# Patient Record
Sex: MALE | Race: WHITE | NOT HISPANIC OR LATINO | Employment: OTHER | URBAN - METROPOLITAN AREA
[De-identification: names, ages, dates, MRNs, and addresses within clinical notes are randomized per-mention and may not be internally consistent; named-entity substitution may affect disease eponyms.]

---

## 2018-05-10 LAB
%SAT (HISTORICAL): 26 % (ref 20–55)
25(OH)D3 SERPL-MCNC: 15.2 NG/ML
ALBUMIN (HISTORICAL): < 0.7 MG/DL
ALBUMIN SERPL BCP-MCNC: 4.5 G/DL (ref 3.5–5.7)
ALP SERPL-CCNC: 49 IU/L (ref 55–165)
ALT SERPL W P-5'-P-CCNC: 18 IU/L (ref 10–35)
ANION GAP SERPL CALCULATED.3IONS-SCNC: 11.6 MM/L
AST SERPL W P-5'-P-CCNC: 26 U/L (ref 8–27)
BASOPHILS # BLD AUTO: 0.1 X3/UL (ref 0–0.3)
BASOPHILS # BLD AUTO: 0.8 % (ref 0–2)
BILIRUB SERPL-MCNC: 0.8 MG/DL (ref 0.3–1)
BILIRUB UR QL STRIP: NEGATIVE
BILIRUBIN DIRECT (HISTORICAL): 0.1 MG/DL (ref 0–0.2)
BUN SERPL-MCNC: 17 MG/DL (ref 7–25)
CALCIUM SERPL-MCNC: 9.1 MG/DL (ref 8.6–10.5)
CHLORIDE SERPL-SCNC: 103 MM/L (ref 98–107)
CHOLEST SERPL-MCNC: 167 MG/DL (ref 0–200)
CLARITY UR: CLEAR
CO2 SERPL-SCNC: 26 MM/L (ref 21–31)
COLOR UR: YELLOW
CREAT SERPL-MCNC: 0.8 MG/DL (ref 0.7–1.3)
DEPRECATED RDW RBC AUTO: 12.9 % (ref 11.5–14.5)
EGFR (HISTORICAL): > 60 GFR
EGFR AFRICAN AMERICAN (HISTORICAL): > 60 GFR
EOSINOPHIL # BLD AUTO: 0.1 X3/UL (ref 0–0.5)
EOSINOPHIL NFR BLD AUTO: 1.4 % (ref 0–5)
GLUCOSE (HISTORICAL): 84 MG/DL (ref 65–99)
GLUCOSE UR STRIP-MCNC: NEGATIVE MG/DL
HCT VFR BLD AUTO: 43.6 % (ref 42–52)
HDLC SERPL-MCNC: 47 MG/DL (ref 40–60)
HGB BLD-MCNC: 14.6 G/DL (ref 14–18)
HGB UR QL STRIP.AUTO: NEGATIVE
IRON SERPL-MCNC: 106 UG/DL (ref 50–212)
KETONES UR STRIP-MCNC: NEGATIVE MG/DL
LDLC SERPL CALC-MCNC: 109.9 MG/DL (ref 75–193)
LEUKOCYTE ESTERASE UR QL STRIP: NEGATIVE
LYMPHOCYTES # BLD AUTO: 2.1 X3/UL (ref 1.2–4.2)
LYMPHOCYTES NFR BLD AUTO: 30 % (ref 20.5–51.1)
MCH RBC QN AUTO: 30.2 PG (ref 26–34)
MCHC RBC AUTO-ENTMCNC: 33.4 G/DL (ref 31–36)
MCV RBC AUTO: 90.4 FL (ref 81–99)
MONOCYTES # BLD AUTO: 0.4 X3/UL (ref 0–1)
MONOCYTES NFR BLD AUTO: 6 % (ref 1.7–12)
NEUTROPHILS # BLD AUTO: 4.4 X3/UL (ref 1.4–6.5)
NEUTS SEG NFR BLD AUTO: 61.8 % (ref 42.2–75.2)
NITRITE UR QL STRIP: NEGATIVE
OSMOLALITY, SERUM (HISTORICAL): 275 MOSM (ref 262–291)
PH UR STRIP.AUTO: 6.5 [PH] (ref 4.5–8)
PLATELET # BLD AUTO: 218 X3/UL (ref 130–400)
PMV BLD AUTO: 8.5 FL (ref 8.6–11.7)
POTASSIUM SERPL-SCNC: 3.6 MM/L (ref 3.5–5.5)
PROSTATE SPECIFIC ANTIGEN FREE (HISTORICAL): 0.02 NG/ML (ref 0.2–4.9)
PROSTATE SPECIFIC ANTIGEN PERCENT FREE (HISTORICAL): 24.6 %
PROT UR STRIP-MCNC: NEGATIVE MG/DL
PSA (HISTORICAL): 0.07 NG/ML (ref 0–4)
RBC # BLD AUTO: 4.82 X6/UL (ref 4.3–5.9)
SODIUM SERPL-SCNC: 137 MM/L (ref 134–143)
SP GR UR STRIP.AUTO: 1.01 (ref 1–1.03)
T4 FREE SERPL-MCNC: 0.97 NG/DL (ref 0.6–1.7)
TIBC SERPL-MCNC: 414 UG/DL (ref 250–425)
TOTAL PROTEIN (HISTORICAL): 7.2 G/DL (ref 6.4–8.9)
TRANSFERRIN (HISTORICAL): 296 MG/DL (ref 215–365)
TRIGL SERPL-MCNC: 53 MG/DL (ref 44–166)
TSH SERPL DL<=0.05 MIU/L-ACNC: 1.52 UIU/M (ref 0.45–5.33)
UROBILINOGEN UR QL STRIP.AUTO: 0.2 EU/DL (ref 0.2–8)
VIT B12 SERPL-MCNC: 585 PG/ML (ref 180–914)
VLDL CHOLESTEROL (HISTORICAL): 11 MG/DL (ref 5–51)
WBC # BLD AUTO: 7.1 X3/UL (ref 4.8–10.8)

## 2020-02-11 ENCOUNTER — HOSPITAL ENCOUNTER (EMERGENCY)
Facility: HOSPITAL | Age: 65
Discharge: HOME | End: 2020-02-11
Attending: FAMILY MEDICINE | Admitting: FAMILY MEDICINE
Payer: COMMERCIAL

## 2020-02-11 ENCOUNTER — APPOINTMENT (EMERGENCY)
Dept: RADIOLOGY | Facility: HOSPITAL | Age: 65
End: 2020-02-11
Payer: COMMERCIAL

## 2020-02-11 ENCOUNTER — OFFICE VISIT (OUTPATIENT)
Dept: URGENT CARE | Facility: CLINIC | Age: 65
End: 2020-02-11
Payer: COMMERCIAL

## 2020-02-11 ENCOUNTER — APPOINTMENT (EMERGENCY)
Dept: CT IMAGING | Facility: HOSPITAL | Age: 65
End: 2020-02-11
Payer: COMMERCIAL

## 2020-02-11 VITALS
HEIGHT: 69 IN | RESPIRATION RATE: 20 BRPM | WEIGHT: 177.2 LBS | SYSTOLIC BLOOD PRESSURE: 218 MMHG | OXYGEN SATURATION: 97 % | BODY MASS INDEX: 26.25 KG/M2 | DIASTOLIC BLOOD PRESSURE: 118 MMHG | HEART RATE: 64 BPM | TEMPERATURE: 96.6 F

## 2020-02-11 VITALS
BODY MASS INDEX: 26.14 KG/M2 | RESPIRATION RATE: 16 BRPM | HEART RATE: 65 BPM | DIASTOLIC BLOOD PRESSURE: 112 MMHG | WEIGHT: 177 LBS | TEMPERATURE: 97.4 F | SYSTOLIC BLOOD PRESSURE: 196 MMHG | OXYGEN SATURATION: 97 %

## 2020-02-11 DIAGNOSIS — R42 DIZZINESS AND GIDDINESS: ICD-10-CM

## 2020-02-11 DIAGNOSIS — Z76.0 MEDICATION REFILL: ICD-10-CM

## 2020-02-11 DIAGNOSIS — I10 HYPERTENSION: Primary | ICD-10-CM

## 2020-02-11 DIAGNOSIS — I16.1 HYPERTENSIVE EMERGENCY: Primary | ICD-10-CM

## 2020-02-11 LAB
ALBUMIN SERPL BCP-MCNC: 4.8 G/DL (ref 3.5–5.7)
ALP SERPL-CCNC: 53 U/L (ref 55–165)
ALT SERPL W P-5'-P-CCNC: 13 U/L (ref 7–52)
ANION GAP SERPL CALCULATED.3IONS-SCNC: 7 MMOL/L (ref 4–13)
APTT PPP: 35 SECONDS (ref 23–37)
AST SERPL W P-5'-P-CCNC: 23 U/L (ref 13–39)
BASOPHILS # BLD AUTO: 0.1 THOUSANDS/ΜL (ref 0–0.1)
BASOPHILS NFR BLD AUTO: 1 % (ref 0–2)
BILIRUB SERPL-MCNC: 0.6 MG/DL (ref 0.2–1)
BUN SERPL-MCNC: 12 MG/DL (ref 7–25)
CALCIUM SERPL-MCNC: 9.6 MG/DL (ref 8.6–10.5)
CHLORIDE SERPL-SCNC: 103 MMOL/L (ref 98–107)
CO2 SERPL-SCNC: 31 MMOL/L (ref 21–31)
CREAT SERPL-MCNC: 0.86 MG/DL (ref 0.7–1.3)
EOSINOPHIL # BLD AUTO: 0.4 THOUSAND/ΜL (ref 0–0.61)
EOSINOPHIL NFR BLD AUTO: 4 % (ref 0–5)
ERYTHROCYTE [DISTWIDTH] IN BLOOD BY AUTOMATED COUNT: 12.5 % (ref 11.5–14.5)
GFR SERPL CREATININE-BSD FRML MDRD: 90 ML/MIN/1.73SQ M
GLUCOSE SERPL-MCNC: 115 MG/DL (ref 65–99)
HCT VFR BLD AUTO: 50 % (ref 42–47)
HGB BLD-MCNC: 16.8 G/DL (ref 14–18)
INR PPP: 1.01 (ref 0.9–1.5)
LYMPHOCYTES # BLD AUTO: 1.7 THOUSANDS/ΜL (ref 0.6–4.47)
LYMPHOCYTES NFR BLD AUTO: 21 % (ref 21–51)
MCH RBC QN AUTO: 31.1 PG (ref 26–34)
MCHC RBC AUTO-ENTMCNC: 33.5 G/DL (ref 31–37)
MCV RBC AUTO: 93 FL (ref 81–99)
MONOCYTES # BLD AUTO: 0.6 THOUSAND/ΜL (ref 0.17–1.22)
MONOCYTES NFR BLD AUTO: 7 % (ref 2–12)
NEUTROPHILS # BLD AUTO: 5.4 THOUSANDS/ΜL (ref 1.4–6.5)
NEUTS SEG NFR BLD AUTO: 67 % (ref 42–75)
PLATELET # BLD AUTO: 215 THOUSANDS/UL (ref 149–390)
PMV BLD AUTO: 8.1 FL (ref 8.6–11.7)
POTASSIUM SERPL-SCNC: 4 MMOL/L (ref 3.5–5.5)
PROT SERPL-MCNC: 8.2 G/DL (ref 6.4–8.9)
PROTHROMBIN TIME: 11.7 SECONDS (ref 10.2–13)
RBC # BLD AUTO: 5.39 MILLION/UL (ref 4.3–5.9)
SODIUM SERPL-SCNC: 141 MMOL/L (ref 134–143)
WBC # BLD AUTO: 8.1 THOUSAND/UL (ref 4.8–10.8)

## 2020-02-11 PROCEDURE — 70450 CT HEAD/BRAIN W/O DYE: CPT

## 2020-02-11 PROCEDURE — 71046 X-RAY EXAM CHEST 2 VIEWS: CPT

## 2020-02-11 PROCEDURE — 93005 ELECTROCARDIOGRAM TRACING: CPT

## 2020-02-11 PROCEDURE — 36415 COLL VENOUS BLD VENIPUNCTURE: CPT | Performed by: PHYSICIAN ASSISTANT

## 2020-02-11 PROCEDURE — 99284 EMERGENCY DEPT VISIT MOD MDM: CPT | Performed by: PHYSICIAN ASSISTANT

## 2020-02-11 PROCEDURE — 85730 THROMBOPLASTIN TIME PARTIAL: CPT | Performed by: PHYSICIAN ASSISTANT

## 2020-02-11 PROCEDURE — 80053 COMPREHEN METABOLIC PANEL: CPT | Performed by: PHYSICIAN ASSISTANT

## 2020-02-11 PROCEDURE — 99203 OFFICE O/P NEW LOW 30 MIN: CPT | Performed by: PHYSICIAN ASSISTANT

## 2020-02-11 PROCEDURE — 85610 PROTHROMBIN TIME: CPT | Performed by: PHYSICIAN ASSISTANT

## 2020-02-11 PROCEDURE — 85025 COMPLETE CBC W/AUTO DIFF WBC: CPT | Performed by: PHYSICIAN ASSISTANT

## 2020-02-11 PROCEDURE — 99284 EMERGENCY DEPT VISIT MOD MDM: CPT

## 2020-02-11 RX ORDER — AMLODIPINE BESYLATE 5 MG/1
5 TABLET ORAL DAILY
Qty: 30 TABLET | Refills: 0 | Status: SHIPPED | OUTPATIENT
Start: 2020-02-11 | End: 2020-02-24 | Stop reason: SDUPTHER

## 2020-02-11 RX ORDER — LOSARTAN POTASSIUM 50 MG/1
50 TABLET ORAL ONCE
Status: COMPLETED | OUTPATIENT
Start: 2020-02-11 | End: 2020-02-11

## 2020-02-11 RX ORDER — LOSARTAN POTASSIUM 50 MG/1
50 TABLET ORAL DAILY
Qty: 30 TABLET | Refills: 0 | Status: SHIPPED | OUTPATIENT
Start: 2020-02-11 | End: 2020-02-24 | Stop reason: SDUPTHER

## 2020-02-11 RX ORDER — AMLODIPINE BESYLATE 5 MG/1
10 TABLET ORAL ONCE
Status: COMPLETED | OUTPATIENT
Start: 2020-02-11 | End: 2020-02-11

## 2020-02-11 RX ADMIN — LOSARTAN POTASSIUM 50 MG: 50 TABLET, FILM COATED ORAL at 10:30

## 2020-02-11 RX ADMIN — AMLODIPINE BESYLATE 10 MG: 5 TABLET ORAL at 10:26

## 2020-02-11 NOTE — PROGRESS NOTES
[facility]        NAME: Daya Lowery is a 75 y o  male  : 1955    MRN: [id number]  DATE: 2020  TIME: 9:45 AM    Assessment and Plan   Hypertensive emergency [I16 1]  1  Hypertensive emergency     2  Dizziness and giddiness       Patient drove himself  Recommended that he go to hospital via ambulance and discussed possible risks if he drove himself  Patient verbalized understanding and stated he would prefer to drive to the hospital   He is going to [facility] ED  Report was given to ER staff  Patient Instructions       Follow up with PCP in 3-5 days  Proceed to  ER    Chief Complaint     Chief Complaint   Patient presents with    Dizziness     c/o dizziness and BP  220 at home, denies HA  Dizziness began today  Pt has not been taking BP meds routinely  Pt had 1 Losartan left and took it last week  History of Present Illness       Denies vision changes, palpitations, chest pain, n/v, diarrhea, abdominal pain, tinnitus, ear pain, weakness, facial drooping, slurred speech, numbness, tingling, hematochezia, melena, HA  PMH of HTN and states he took his BP today "and it was 220"  He has not taken his BP medication because he ran out  He last took losartan last week  He additionally normally takes amlodipine  Denies PMH of arrhythmia, vertigo, murmurs, cva/tia, endocrine disorders, MI, neurological disorders, DM  Dizziness   This is a new problem  The current episode started today  Pertinent negatives include no abdominal pain, chest pain, chills, fever, headaches, nausea, numbness, vomiting or weakness  He has tried nothing for the symptoms  Review of Systems   Review of Systems   Constitutional: Negative for chills and fever  HENT: Negative  Eyes: Negative for visual disturbance  Respiratory: Negative for shortness of breath  Cardiovascular: Negative for chest pain, palpitations and leg swelling     Gastrointestinal: Negative for abdominal pain, nausea and vomiting  Skin: Negative for color change  Neurological: Positive for light-headedness  Negative for dizziness, syncope, facial asymmetry, speech difficulty, weakness, numbness and headaches  Psychiatric/Behavioral: Negative for confusion  Current Medications     No current outpatient medications on file  Current Allergies     Allergies as of 02/11/2020    (No Known Allergies)            The following portions of the patient's history were reviewed and updated as appropriate: allergies, current medications, past family history, past medical history, past social history, past surgical history and problem list      Past Medical History:   Diagnosis Date    Hypertension     Prostate cancer ([address] ) 2001    Stroke [facility])        Past Surgical History:   Procedure Laterality Date    PROSTATECTOMY  2001    TONSILLECTOMY         No family history on file  Medications have been verified  Objective   BP (!) 218/118 (BP Location: Left arm, Patient Position: Sitting, Cuff Size: Large) Comment: manual  Pulse 64   Temp (!) 96 6 °F (35 9 °C) (Tympanic)   Resp 20   Ht 5' 9" (1 753 m)   Wt 80 4 kg (177 lb 3 2 oz)   SpO2 97%   BMI 26 17 kg/m²        Physical Exam     Physical Exam   Constitutional: He is oriented to person, place, and time  He appears well-developed and well-nourished  No distress  HENT:   Head: Normocephalic and atraumatic  Right Ear: External ear normal    Left Ear: External ear normal    Mouth/Throat: Oropharynx is clear and moist  No oropharyngeal exudate  Eyes: Pupils are equal, round, and reactive to light  EOM are normal    Cardiovascular: Normal rate, regular rhythm and normal heart sounds  Exam reveals no gallop and no friction rub  No murmur heard  Pulmonary/Chest: Effort normal and breath sounds normal  No respiratory distress  He has no wheezes  He has no rales  He exhibits no tenderness  Musculoskeletal: Normal range of motion     UE/LE MS 5/5 bilaterally  Neurological: He is alert and oriented to person, place, and time  He displays normal reflexes  No cranial nerve deficit or sensory deficit  Coordination normal    Finger to nose without dysmetria  No disdiadochokinesia  Skin: Skin is warm  Psychiatric: He has a normal mood and affect  His behavior is normal  Judgment and thought content normal    Vitals reviewed

## 2020-02-11 NOTE — ED PROVIDER NOTES
History  Chief Complaint   Patient presents with    High Blood Pressure     Patient presents to the emergency department today for evaluation elevated blood pressure  He was sent here by the urgent care  Patient states he has been out of his blood pressure medications for 2 weeks  He has been experiencing intermittent dizziness during that time  He denies headache  Denies chest pain shortness of breath  No vomiting  Denies leg pain leg edema  Patient was on 2 different blood pressure medications however he states his family doctor is no longer in the area and does not have anybody to prescribe them  Patient does not appear acutely toxic however is hypertensive  None       Past Medical History:   Diagnosis Date    Hypertension     Prostate cancer ([facility] [address] ) 2001    [facility]roke [facility])        Past Surgical History:   Procedure Laterality Date    PROSTATECTOMY  2001    TONSILLECTOMY         History reviewed  No pertinent family history  I have reviewed and agree with the history as documented  Social History     Tobacco Use    Smoking status: Former Smoker    Smokeless tobacco: Never Used   Substance Use Topics    Alcohol use: Yes    Drug use: Yes     Types: Marijuana       Review of Systems   Constitutional: Negative  Negative for activity change, appetite change, chills, diaphoresis, fatigue, fever and unexpected weight change  HENT: Negative  Negative for sore throat, trouble swallowing and voice change  Eyes: Negative  Respiratory: Negative  Negative for cough, chest tightness, shortness of breath and wheezing  Cardiovascular: Negative  Negative for chest pain, palpitations and leg swelling  Gastrointestinal: Negative  Negative for abdominal pain, blood in stool, nausea and vomiting  Endocrine: Negative  Genitourinary: Negative  Negative for flank pain and hematuria  Musculoskeletal: Negative    Negative for arthralgias, back pain, gait problem, joint swelling, myalgias, neck pain and neck stiffness  Skin: Negative  Negative for rash and wound  Allergic/Immunologic: Negative  Neurological: Positive for dizziness and light-headedness  Negative for seizures, syncope, weakness and headaches  Hematological: Negative  Psychiatric/Behavioral: Negative  All other systems reviewed and are negative  Physical Exam  Physical Exam   Constitutional: He is oriented to person, place, and time  Vital signs are normal  He appears well-developed and well-nourished  He does not have a sickly appearance  He does not appear ill  No distress  HENT:   Right Ear: External ear normal  No swelling  Tympanic membrane is not bulging  Left Ear: External ear normal  No swelling  Tympanic membrane is not bulging  Nose: Nose normal    Mouth/Throat: Oropharynx is clear and moist  No oropharyngeal exudate  Eyes: Pupils are equal, round, and reactive to light  Conjunctivae, EOM and lids are normal    Neck: Normal range of motion  Neck supple  No JVD present  No tracheal deviation, no edema and normal range of motion present  No thyromegaly present  Cardiovascular: Normal rate, regular rhythm, normal heart sounds, intact distal pulses and normal pulses  Exam reveals no gallop and no friction rub  No murmur heard  Pulmonary/Chest: Effort normal and breath sounds normal  No stridor  No respiratory distress  He has no wheezes  He has no rales  He exhibits no tenderness  Abdominal: Soft  Bowel sounds are normal  He exhibits no distension and no mass  There is no tenderness  There is no rebound, no guarding and negative Isaacs's sign  No hernia  Musculoskeletal: Normal range of motion  He exhibits no edema or tenderness  Lymphadenopathy:     He has no cervical adenopathy  Neurological: He is alert and oriented to person, place, and time  He has normal strength and normal reflexes  He is not disoriented  He displays no atrophy and no tremor   No cranial nerve deficit or sensory deficit  He exhibits normal muscle tone  He displays a negative Romberg sign  He displays no seizure activity  Coordination normal  GCS eye subscore is 4  GCS verbal subscore is 5  GCS motor subscore is 6  Skin: Skin is warm and dry  Capillary refill takes less than 2 seconds  No rash noted  He is not diaphoretic  No erythema  No pallor  Psychiatric: He has a normal mood and affect  His speech is normal and behavior is normal    Vitals reviewed        Vital Signs  ED Triage Vitals [02/11/20 1011]   Temperature Pulse Respirations Blood Pressure SpO2   (!) 97 4 °F (36 3 °C) 75 18 (!) 265/140 98 %      Temp Source Heart Rate Source Patient Position - Orthostatic VS BP Location FiO2 (%)   Temporal Monitor Sitting Left arm --      Pain Score       No Pain           Vitals:    02/11/20 1011 02/11/20 1030   BP: (!) 265/140 (!) 197/116   Pulse: 75 69   Patient Position - Orthostatic VS: Sitting          Visual Acuity      ED Medications  Medications   losartan (COZAAR) tablet 50 mg (50 mg Oral Given 2/11/20 1030)   amLODIPine (NORVASC) tablet 10 mg (10 mg Oral Given 2/11/20 1026)       Diagnostic Studies  Results Reviewed     Procedure Component Value Units Date/Time    Comprehensive metabolic panel [[id number]]  (Abnormal) Collected:  02/11/20 1025    Lab Status:  Final result Specimen:  Blood from Arm, Left Updated:  02/11/20 1054     Sodium 141 mmol/L      Potassium 4 0 mmol/L      Chloride 103 mmol/L      CO2 31 mmol/L      ANION GAP 7 mmol/L      BUN 12 mg/dL      Creatinine 0 86 mg/dL      Glucose 115 mg/dL      Calcium 9 6 mg/dL      AST 23 U/L      ALT 13 U/L      Alkaline Phosphatase 53 U/L      Total Protein 8 2 g/dL      Albumin 4 8 g/dL      Total Bilirubin 0 60 mg/dL      eGFR 90 ml/min/1 73sq m     Narrative:       [place] guidelines for Chronic Kidney Disease (CKD):     Stage 1 with normal or high GFR (GFR > 90 mL/min/1 73 square meters)    Stage 2 Mild CKD (GFR = 60-89 mL/min/1 73 square meters)    Stage 3A Moderate CKD (GFR = 45-59 mL/min/1 73 square meters)    Stage 3B Moderate CKD (GFR = 30-44 mL/min/1 73 square meters)    Stage 4 Severe CKD (GFR = 15-29 mL/min/1 73 square meters)    Stage 5 End Stage CKD (GFR <15 mL/min/1 73 square meters)  Note: GFR calculation is accurate only with a steady state creatinine    Protime-INR [[id number]]  (Normal) Collected:  02/11/20 1025    Lab Status:  Final result Specimen:  Blood from Arm, Left Updated:  02/11/20 1051     Protime 11 7 seconds      INR 1 01    APTT [[id number]]  (Normal) Collected:  02/11/20 1025    Lab Status:  Final result Specimen:  Blood from Arm, Left Updated:  02/11/20 1051     PTT 35 seconds     CBC and differential [[id number]]  (Abnormal) Collected:  02/11/20 1025    Lab Status:  Final result Specimen:  Blood from Arm, Left Updated:  02/11/20 1034     WBC 8 10 Thousand/uL      RBC 5 39 Million/uL      Hemoglobin 16 8 g/dL      Hematocrit 50 0 %      MCV 93 fL      MCH 31 1 pg      MCHC 33 5 g/dL      RDW 12 5 %      MPV 8 1 fL      Platelets 526 Thousands/uL      Neutrophils Relative 67 %      Lymphocytes Relative 21 %      Monocytes Relative 7 %      Eosinophils Relative 4 %      Basophils Relative 1 %      Neutrophils Absolute 5 40 Thousands/µL      Lymphocytes Absolute 1 70 Thousands/µL      Monocytes Absolute 0 60 Thousand/µL      Eosinophils Absolute 0 40 Thousand/µL      Basophils Absolute 0 10 Thousands/µL                  CT head without contrast   Final Result by Lisa Villalta MD (02/11 1131)      No acute intracranial abnormality  Workstation performed: [id number]         XR chest 2 views   ED Interpretation by Tita Patrick PA-C (02/11 1115)   No evidence of acute cardiopulmonary process, specifically no evidence of cardiomegaly, volume overload, pneumothorax, pleural effusion  Trachea is midline                   Procedures  ECG 12 Lead Documentation Only  Date/Time: 2/11/2020 10:50 AM  Performed by: Jodi Calero PA-C  Authorized by: Jodi Calero PA-C     Indications / Diagnosis:  HTN crisis  ECG reviewed by me, the ED Provider: yes    Previous ECG:     Comparison to cardiac monitor: Yes    Interpretation:     Interpretation: normal    Rate:     ECG rate:  65    ECG rate assessment: normal    Rhythm:     Rhythm: sinus rhythm    Ectopy:     Ectopy: none    QRS:     QRS axis:  Normal    QRS intervals:  Normal  Conduction:     Conduction: normal    ST segments:     ST segments:  Normal  T waves:     T waves: normal               ED Course  ED Course as of Feb 11 1152   Tue Feb 11, 2020   1040 WBC: 8 10   1040 Hemoglobin: 16 8   1040 Platelet Count: 045   1048 Blood Pressure(!): 265/140   1048 Temperature(!): 97 4 °F (36 3 °C)   1048 Pulse: 75   1048 Respirations: 18   1048 SpO2: 98 %   1110 BUN: 12   1110 Creatinine: 0 86   1110 AST: 23   1110 TOTAL BILIRUBIN: 0 60   1110 eGFR: 90   1112 Awaiting CT head      1112 Blood Pressure(!): 197/116   1149 FINDINGS:     PARENCHYMA:  No intracranial mass, mass effect or midline shift  No CT signs of acute infarction  No acute parenchymal hemorrhage      VENTRICLES AND EXTRA-AXIAL SPACES:  Normal for the patient's age      VISUALIZED ORBITS AND PARANASAL SINUSES:  Unremarkable      CALVARIUM AND EXTRACRANIAL SOFT TISSUES:  Normal      IMPRESSION:     No acute intracranial abnormality              HEART Risk Score      Most Recent Value   History  0 Filed at: 02/11/2020 1055   ECG  0 Filed at: 02/11/2020 1055   Age  2 Filed at: 02/11/2020 1055   Risk Factors  1 Filed at: 02/11/2020 1055   Troponin  0 Filed at: 02/11/2020 1055   Heart Score Risk Calculator   History  0 Filed at: 02/11/2020 1055   ECG  0 Filed at: 02/11/2020 1055   Age  2 Filed at: 02/11/2020 1055   Risk Factors  1 Filed at: 02/11/2020 1055   Troponin  0 Filed at: 02/11/2020 1055   HEART Score  3 Filed at: 02/11/2020 1055   HEART Score  3 Filed at: 02/11/2020 1055 Stroke Assessment     Row Name 02/11/20 1020             NIH Stroke Scale    Interval  Baseline      Level of Consciousness (1a )  0      LOC Questions (1b )  0      LOC Commands (1c )  0      Best Gaze (2 )  0      Visual (3 )  0      Facial Palsy (4 )  0      Motor Arm, Left (5a )  0      Motor Arm, Right (5b )  0      Motor Leg, Left (6a )  0      Motor Leg, Right (6b )  0      Limb Ataxia (7 )  0      Sensory (8 )  0      Best Language (9 )  0      Dysarthria (10 )  0      Extinction and Inattention (11 ) (Formerly Neglect)  0      Total  0          First Filed Value   TPA Decision  Patient not a TPA candidate  Patient is not a candidate options  Uncontrolled refactory hypertension  , Symptoms resolved/clearly non disabling , Unclear time of onset outside appropriate time window  MDM      Disposition  Final diagnoses:   Hypertension   Medication refill     Time reflects when diagnosis was documented in both MDM as applicable and the Disposition within this note     Time User Action Codes Description Comment    2/11/2020 11:51 AM Lillian CANO Add [I10] Hypertension     2/11/2020 11:51 AM Lillian CANO Add [Z76 0] Medication refill       ED Disposition     ED Disposition Condition Date/Time Comment    Discharge Stable Tue Feb 11, 2020 11:51 AM Ann-Marie Landaverde discharge to home/self care              Follow-up Information     Follow up With Specialties Details Why Contact Info    Ian Chaves DO Family Medicine Schedule an appointment as soon as possible for a visit   [address] [facility] [address] [phone]            Patient's Medications   Discharge Prescriptions    AMLODIPINE (NORVASC) 5 MG TABLET    Take 1 tablet (5 mg total) by mouth daily       Start Date: 2/11/2020 End Date: --       Order Dose: 5 mg       Quantity: 30 tablet    Refills: 0    LOSARTAN (COZAAR) 50 MG TABLET    Take 1 tablet (50 mg total) by mouth daily       Start Date: 2/11/2020 End Date: --       Order Dose: 50 mg       Quantity: 30 tablet    Refills: 0     No discharge procedures on file      PDMP Review     None          ED Provider  Electronically Signed by           Radha Ravi PA-C  02/11/20 4502

## 2020-02-12 LAB
ATRIAL RATE: 65 BPM
P AXIS: 31 DEGREES
PR INTERVAL: 186 MS
QRS AXIS: -35 DEGREES
QRSD INTERVAL: 92 MS
QT INTERVAL: 398 MS
QTC INTERVAL: 413 MS
T WAVE AXIS: 58 DEGREES
VENTRICULAR RATE: 65 BPM

## 2020-02-12 PROCEDURE — 93010 ELECTROCARDIOGRAM REPORT: CPT | Performed by: INTERNAL MEDICINE

## 2020-02-24 ENCOUNTER — OFFICE VISIT (OUTPATIENT)
Dept: FAMILY MEDICINE CLINIC | Facility: CLINIC | Age: 65
End: 2020-02-24
Payer: COMMERCIAL

## 2020-02-24 VITALS
WEIGHT: 177 LBS | TEMPERATURE: 96.7 F | BODY MASS INDEX: 27.78 KG/M2 | DIASTOLIC BLOOD PRESSURE: 82 MMHG | HEIGHT: 67 IN | SYSTOLIC BLOOD PRESSURE: 139 MMHG | OXYGEN SATURATION: 96 % | HEART RATE: 73 BPM

## 2020-02-24 DIAGNOSIS — Z12.5 SCREENING FOR PROSTATE CANCER: ICD-10-CM

## 2020-02-24 DIAGNOSIS — E66.3 OVERWEIGHT (BMI 25.0-29.9): ICD-10-CM

## 2020-02-24 DIAGNOSIS — I10 ESSENTIAL HYPERTENSION: Primary | ICD-10-CM

## 2020-02-24 DIAGNOSIS — Z13.31 NEGATIVE DEPRESSION SCREENING: ICD-10-CM

## 2020-02-24 DIAGNOSIS — Z76.0 MEDICATION REFILL: ICD-10-CM

## 2020-02-24 DIAGNOSIS — I10 HYPERTENSION: ICD-10-CM

## 2020-02-24 PROCEDURE — 3079F DIAST BP 80-89 MM HG: CPT | Performed by: FAMILY MEDICINE

## 2020-02-24 PROCEDURE — 1036F TOBACCO NON-USER: CPT | Performed by: FAMILY MEDICINE

## 2020-02-24 PROCEDURE — 1160F RVW MEDS BY RX/DR IN RCRD: CPT | Performed by: FAMILY MEDICINE

## 2020-02-24 PROCEDURE — 3008F BODY MASS INDEX DOCD: CPT | Performed by: FAMILY MEDICINE

## 2020-02-24 PROCEDURE — 99203 OFFICE O/P NEW LOW 30 MIN: CPT | Performed by: FAMILY MEDICINE

## 2020-02-24 PROCEDURE — 3075F SYST BP GE 130 - 139MM HG: CPT | Performed by: FAMILY MEDICINE

## 2020-02-24 RX ORDER — LOSARTAN POTASSIUM 50 MG/1
50 TABLET ORAL DAILY
Qty: 30 TABLET | Refills: 3 | Status: SHIPPED | OUTPATIENT
Start: 2020-02-24 | End: 2020-09-11 | Stop reason: SDUPTHER

## 2020-02-24 RX ORDER — AMLODIPINE BESYLATE 5 MG/1
5 TABLET ORAL DAILY
Qty: 30 TABLET | Refills: 3 | Status: SHIPPED | OUTPATIENT
Start: 2020-02-24 | End: 2020-09-11 | Stop reason: SDUPTHER

## 2020-02-24 RX ORDER — ASPIRIN 81 MG/1
81 TABLET ORAL DAILY
COMMUNITY
Start: 2012-08-21

## 2020-02-24 NOTE — PROGRESS NOTES
Assessment/Plan:    No problem-specific Assessment & Plan notes found for this encounter  Diagnoses and all orders for this visit:    Essential hypertension  -     CBC and differential; Future  -     Comprehensive metabolic panel; Future  -     Lipid panel; Future  -     TSH, 3rd generation with Free T4 reflex; Future    Screening for prostate cancer  -     PSA, Total Screen; Future    Negative depression screening    Overweight (BMI 25 0-29  9)    Other orders  -     aspirin (ECOTRIN LOW STRENGTH) 81 mg EC tablet; Take 81 mg by mouth daily          PHQ-9 Depression Screening    PHQ-9:    Frequency of the following problems over the past two weeks:       Little interest or pleasure in doing things:  0 - not at all  Feeling down, depressed, or hopeless:  0 - not at all  PHQ-2 Score:  0        BMI Counseling: Body mass index is 27 72 kg/m²  The BMI is above normal  Nutrition recommendations include reducing portion sizes and 3-5 servings of fruits/vegetables daily  Exercise recommendations include moderate aerobic physical activity for 150 minutes/week and exercising 3-5 times per week  Subjective:      Patient ID: Arnoldo Harrell is a 75 y o  male  New pt here to establish with HTN, 2001 prostetectomy, tonsils, quit smoking 20 years ago, occ  EtoH, no illegal drugs,  1 daughter 39, 1 grandchild, retired from printing      The following portions of the patient's history were reviewed and updated as appropriate: allergies, current medications, past family history, past medical history, past social history, past surgical history and problem list     Review of Systems   Constitutional: Negative  Negative for chills, fatigue and fever  HENT: Negative  Eyes: Negative  Respiratory: Negative for shortness of breath and wheezing  Cardiovascular: Negative for chest pain and palpitations  Gastrointestinal: Negative for abdominal pain, blood in stool, constipation, diarrhea, nausea and vomiting  Endocrine: Negative  Genitourinary: Negative for difficulty urinating and dysuria  Musculoskeletal: Negative for arthralgias and myalgias  Skin: Negative  Allergic/Immunologic: Negative  Neurological: Negative for seizures and syncope  Hematological: Negative for adenopathy  Psychiatric/Behavioral: Negative  Objective:    /82   Pulse 73   Temp (!) 96 7 °F (35 9 °C)   Ht 5' 7" (1 702 m)   Wt 80 3 kg (177 lb)   SpO2 96%   BMI 27 72 kg/m²      Physical Exam   Constitutional: He is oriented to person, place, and time  He appears well-developed and well-nourished  No distress  HENT:   Head: Normocephalic and atraumatic  Right Ear: External ear normal    Left Ear: External ear normal    Nose: Nose normal    Mouth/Throat: Oropharynx is clear and moist    Eyes: Pupils are equal, round, and reactive to light  Conjunctivae and EOM are normal  No scleral icterus  Neck: Normal range of motion  Neck supple  Cardiovascular: Normal rate, regular rhythm and normal heart sounds  Exam reveals no gallop and no friction rub  No murmur heard  Pulmonary/Chest: Effort normal and breath sounds normal  No respiratory distress  He has no wheezes  He has no rales  Abdominal: Soft  Bowel sounds are normal  He exhibits no distension and no mass  There is no tenderness  There is no rebound and no guarding  Musculoskeletal: Normal range of motion  He exhibits no edema  Lymphadenopathy:     He has no cervical adenopathy  Neurological: He is alert and oriented to person, place, and time  He has normal reflexes  Skin: Skin is warm and dry  He is not diaphoretic  Psychiatric: He has a normal mood and affect   His behavior is normal  Judgment and thought content normal

## 2020-09-08 ENCOUNTER — LAB (OUTPATIENT)
Dept: LAB | Facility: CLINIC | Age: 65
End: 2020-09-08
Payer: COMMERCIAL

## 2020-09-11 ENCOUNTER — OFFICE VISIT (OUTPATIENT)
Dept: FAMILY MEDICINE CLINIC | Facility: CLINIC | Age: 65
End: 2020-09-11
Payer: COMMERCIAL

## 2020-09-11 VITALS
WEIGHT: 172.2 LBS | BODY MASS INDEX: 27.03 KG/M2 | DIASTOLIC BLOOD PRESSURE: 78 MMHG | HEIGHT: 67 IN | OXYGEN SATURATION: 97 % | TEMPERATURE: 96.9 F | SYSTOLIC BLOOD PRESSURE: 139 MMHG | HEART RATE: 71 BPM

## 2020-09-11 DIAGNOSIS — Z00.00 ANNUAL PHYSICAL EXAM: Primary | ICD-10-CM

## 2020-09-11 DIAGNOSIS — Z12.11 SCREENING FOR COLON CANCER: ICD-10-CM

## 2020-09-11 DIAGNOSIS — Z23 ENCOUNTER FOR IMMUNIZATION: ICD-10-CM

## 2020-09-11 DIAGNOSIS — I10 HYPERTENSION: ICD-10-CM

## 2020-09-11 DIAGNOSIS — Z76.0 MEDICATION REFILL: ICD-10-CM

## 2020-09-11 DIAGNOSIS — I10 ESSENTIAL HYPERTENSION: ICD-10-CM

## 2020-09-11 PROCEDURE — 90472 IMMUNIZATION ADMIN EACH ADD: CPT

## 2020-09-11 PROCEDURE — 90750 HZV VACC RECOMBINANT IM: CPT

## 2020-09-11 PROCEDURE — 4040F PNEUMOC VAC/ADMIN/RCVD: CPT | Performed by: FAMILY MEDICINE

## 2020-09-11 PROCEDURE — 1036F TOBACCO NON-USER: CPT | Performed by: FAMILY MEDICINE

## 2020-09-11 PROCEDURE — 90670 PCV13 VACCINE IM: CPT

## 2020-09-11 PROCEDURE — 1160F RVW MEDS BY RX/DR IN RCRD: CPT | Performed by: FAMILY MEDICINE

## 2020-09-11 PROCEDURE — 3725F SCREEN DEPRESSION PERFORMED: CPT | Performed by: FAMILY MEDICINE

## 2020-09-11 PROCEDURE — 99397 PER PM REEVAL EST PAT 65+ YR: CPT | Performed by: FAMILY MEDICINE

## 2020-09-11 PROCEDURE — 3078F DIAST BP <80 MM HG: CPT | Performed by: FAMILY MEDICINE

## 2020-09-11 PROCEDURE — 3075F SYST BP GE 130 - 139MM HG: CPT | Performed by: FAMILY MEDICINE

## 2020-09-11 PROCEDURE — 90471 IMMUNIZATION ADMIN: CPT

## 2020-09-11 RX ORDER — LOSARTAN POTASSIUM 50 MG/1
50 TABLET ORAL DAILY
Qty: 30 TABLET | Refills: 3 | Status: SHIPPED | OUTPATIENT
Start: 2020-09-11 | End: 2021-02-15 | Stop reason: SDUPTHER

## 2020-09-11 RX ORDER — AMLODIPINE BESYLATE 5 MG/1
5 TABLET ORAL DAILY
Qty: 30 TABLET | Refills: 3 | Status: SHIPPED | OUTPATIENT
Start: 2020-09-11 | End: 2021-02-15 | Stop reason: SDUPTHER

## 2020-09-11 NOTE — PROGRESS NOTES
Assessment/Plan:    No problem-specific Assessment & Plan notes found for this encounter  Diagnoses and all orders for this visit:    Annual physical exam    Essential hypertension    Screening for colon cancer  -     Cologuard; Future    Encounter for immunization  -     PNEUMOCOCCAL CONJUGATE VACCINE 13-VALENT GREATER THAN 6 MONTHS  -     Zoster Vaccine Recombinant IM          PHQ-9 Depression Screening    PHQ-9:    Frequency of the following problems over the past two weeks:       Little interest or pleasure in doing things:  0 - not at all  Feeling down, depressed, or hopeless:  0 - not at all  PHQ-2 Score:  0            Subjective:      Patient ID: Cinthya Ricketts is a 77 y o  male  Pt here for annual physical    Hypertension   This is a chronic problem  The current episode started more than 1 year ago  The problem is unchanged  The problem is controlled  Pertinent negatives include no chest pain, palpitations or shortness of breath  Risk factors for coronary artery disease include male gender and sedentary lifestyle  Past treatments include calcium channel blockers and angiotensin blockers  Compliance problems include diet and exercise  The following portions of the patient's history were reviewed and updated as appropriate: allergies, current medications, past family history, past medical history, past social history, past surgical history and problem list     Review of Systems   Constitutional: Negative  Negative for chills, fatigue and fever  HENT: Negative  Eyes: Negative  Respiratory: Negative for shortness of breath and wheezing  Cardiovascular: Negative for chest pain and palpitations  Gastrointestinal: Negative for abdominal pain, blood in stool, constipation, diarrhea, nausea and vomiting  Endocrine: Negative  Genitourinary: Negative for difficulty urinating and dysuria  Musculoskeletal: Negative for arthralgias and myalgias  Skin: Negative      Allergic/Immunologic: Negative  Neurological: Negative for seizures and syncope  Hematological: Negative for adenopathy  Psychiatric/Behavioral: Negative  Objective:    /78   Pulse 71   Temp (!) 96 9 °F (36 1 °C) (Tympanic)   Ht 5' 7" (1 702 m)   Wt 78 1 kg (172 lb 3 2 oz)   SpO2 97%   BMI 26 97 kg/m²      Physical Exam  Vitals signs and nursing note reviewed  Constitutional:       General: He is not in acute distress  Appearance: Normal appearance  He is well-developed and normal weight  He is not ill-appearing, toxic-appearing or diaphoretic  HENT:      Head: Normocephalic and atraumatic  Right Ear: Tympanic membrane, ear canal and external ear normal  There is no impacted cerumen  Left Ear: Tympanic membrane, ear canal and external ear normal  There is no impacted cerumen  Nose: Nose normal  No congestion or rhinorrhea  Mouth/Throat:      Mouth: Mucous membranes are moist       Pharynx: No oropharyngeal exudate or posterior oropharyngeal erythema  Eyes:      General: No scleral icterus  Conjunctiva/sclera: Conjunctivae normal       Pupils: Pupils are equal, round, and reactive to light  Neck:      Musculoskeletal: Normal range of motion and neck supple  No neck rigidity  Cardiovascular:      Rate and Rhythm: Normal rate and regular rhythm  Heart sounds: Normal heart sounds  No murmur  No friction rub  No gallop  Pulmonary:      Effort: Pulmonary effort is normal  No respiratory distress  Breath sounds: Normal breath sounds  No wheezing or rales  Abdominal:      General: Bowel sounds are normal  There is no distension  Palpations: Abdomen is soft  There is no mass  Tenderness: There is no abdominal tenderness  There is no guarding or rebound  Musculoskeletal: Normal range of motion  Right lower leg: No edema  Left lower leg: No edema  Lymphadenopathy:      Cervical: No cervical adenopathy  Skin:     General: Skin is warm and dry  Findings: No rash  Neurological:      General: No focal deficit present  Mental Status: He is alert and oriented to person, place, and time  Sensory: No sensory deficit  Motor: No weakness  Coordination: Coordination normal       Gait: Gait normal       Deep Tendon Reflexes: Reflexes are normal and symmetric  Psychiatric:         Mood and Affect: Mood normal          Behavior: Behavior normal          Thought Content:  Thought content normal          Judgment: Judgment normal

## 2020-12-02 ENCOUNTER — CLINICAL SUPPORT (OUTPATIENT)
Dept: FAMILY MEDICINE CLINIC | Facility: CLINIC | Age: 65
End: 2020-12-02
Payer: COMMERCIAL

## 2020-12-02 DIAGNOSIS — Z23 ENCOUNTER FOR IMMUNIZATION: Primary | ICD-10-CM

## 2020-12-02 PROCEDURE — 90750 HZV VACC RECOMBINANT IM: CPT | Performed by: FAMILY MEDICINE

## 2020-12-02 PROCEDURE — 90471 IMMUNIZATION ADMIN: CPT | Performed by: FAMILY MEDICINE

## 2021-02-15 DIAGNOSIS — Z76.0 MEDICATION REFILL: ICD-10-CM

## 2021-02-15 DIAGNOSIS — I10 HYPERTENSION: ICD-10-CM

## 2021-02-16 RX ORDER — AMLODIPINE BESYLATE 5 MG/1
5 TABLET ORAL DAILY
Qty: 30 TABLET | Refills: 3 | Status: SHIPPED | OUTPATIENT
Start: 2021-02-16 | End: 2021-02-17 | Stop reason: SDUPTHER

## 2021-02-16 RX ORDER — LOSARTAN POTASSIUM 50 MG/1
50 TABLET ORAL DAILY
Qty: 30 TABLET | Refills: 3 | Status: SHIPPED | OUTPATIENT
Start: 2021-02-16 | End: 2021-02-17 | Stop reason: SDUPTHER

## 2021-02-17 DIAGNOSIS — Z76.0 MEDICATION REFILL: ICD-10-CM

## 2021-02-17 DIAGNOSIS — I10 HYPERTENSION: ICD-10-CM

## 2021-02-17 RX ORDER — LOSARTAN POTASSIUM 50 MG/1
50 TABLET ORAL DAILY
Qty: 30 TABLET | Refills: 5 | Status: SHIPPED | OUTPATIENT
Start: 2021-02-17 | End: 2021-07-08 | Stop reason: SDUPTHER

## 2021-02-17 RX ORDER — AMLODIPINE BESYLATE 5 MG/1
5 TABLET ORAL DAILY
Qty: 30 TABLET | Refills: 5 | Status: SHIPPED | OUTPATIENT
Start: 2021-02-17 | End: 2021-03-18

## 2021-03-18 ENCOUNTER — OFFICE VISIT (OUTPATIENT)
Dept: FAMILY MEDICINE CLINIC | Facility: CLINIC | Age: 66
End: 2021-03-18
Payer: COMMERCIAL

## 2021-03-18 VITALS
DIASTOLIC BLOOD PRESSURE: 82 MMHG | HEART RATE: 73 BPM | HEIGHT: 67 IN | BODY MASS INDEX: 27.65 KG/M2 | OXYGEN SATURATION: 97 % | SYSTOLIC BLOOD PRESSURE: 148 MMHG | WEIGHT: 176.2 LBS | TEMPERATURE: 96.4 F

## 2021-03-18 DIAGNOSIS — I10 ESSENTIAL HYPERTENSION: Primary | ICD-10-CM

## 2021-03-18 DIAGNOSIS — Z13.31 NEGATIVE DEPRESSION SCREENING: ICD-10-CM

## 2021-03-18 DIAGNOSIS — Z11.59 NEED FOR HEPATITIS C SCREENING TEST: ICD-10-CM

## 2021-03-18 DIAGNOSIS — E66.3 OVERWEIGHT (BMI 25.0-29.9): ICD-10-CM

## 2021-03-18 PROCEDURE — 3079F DIAST BP 80-89 MM HG: CPT | Performed by: FAMILY MEDICINE

## 2021-03-18 PROCEDURE — 1036F TOBACCO NON-USER: CPT | Performed by: FAMILY MEDICINE

## 2021-03-18 PROCEDURE — 3077F SYST BP >= 140 MM HG: CPT | Performed by: FAMILY MEDICINE

## 2021-03-18 PROCEDURE — 99213 OFFICE O/P EST LOW 20 MIN: CPT | Performed by: FAMILY MEDICINE

## 2021-03-18 PROCEDURE — 1160F RVW MEDS BY RX/DR IN RCRD: CPT | Performed by: FAMILY MEDICINE

## 2021-03-18 PROCEDURE — 3725F SCREEN DEPRESSION PERFORMED: CPT | Performed by: FAMILY MEDICINE

## 2021-03-18 PROCEDURE — 3008F BODY MASS INDEX DOCD: CPT | Performed by: FAMILY MEDICINE

## 2021-03-18 RX ORDER — AMLODIPINE BESYLATE 10 MG/1
10 TABLET ORAL DAILY
Qty: 30 TABLET | Refills: 6 | Status: SHIPPED | OUTPATIENT
Start: 2021-03-18 | End: 2021-12-06 | Stop reason: SDUPTHER

## 2021-03-18 NOTE — PROGRESS NOTES
Assessment/Plan:    No problem-specific Assessment & Plan notes found for this encounter  Diagnoses and all orders for this visit:    Essential hypertension  Comments:  no change in the medication  Orders:  -     amLODIPine (NORVASC) 10 mg tablet; Take 1 tablet (10 mg total) by mouth daily    Need for hepatitis C screening test  -     Hepatitis C Antibody ([facility]); Future    Overweight (BMI 25 0-29  9)    Negative depression screening          PHQ-9 Depression Screening    PHQ-9:   Frequency of the following problems over the past two weeks:      Little interest or pleasure in doing things: 0 - not at all  Feeling down, depressed, or hopeless: 0 - not at all  PHQ-2 Score: 0        BMI Counseling: Body mass index is 27 6 kg/m²  The BMI is above normal  Nutrition recommendations include reducing portion sizes and 3-5 servings of fruits/vegetables daily  Exercise recommendations include moderate aerobic physical activity for 150 minutes/week and exercising 3-5 times per week  Subjective:      Patient ID: Daya Lowery is a 77 y o  male  Hypertension  This is a chronic problem  The current episode started more than 1 year ago  The problem is unchanged  The problem is uncontrolled  Pertinent negatives include no chest pain, headaches or palpitations  There are no associated agents to hypertension  Risk factors for coronary artery disease include male gender and sedentary lifestyle  Past treatments include calcium channel blockers and angiotensin blockers  Compliance problems include diet and exercise  The following portions of the patient's history were reviewed and updated as appropriate: allergies, current medications, past family history, past medical history, past social history, past surgical history and problem list     Review of Systems   Eyes: Negative for visual disturbance  Cardiovascular: Negative for chest pain and palpitations  Neurological: Negative for headaches  Objective:    /82   Pulse 73   Temp (!) 96 4 °F (35 8 °C) (Tympanic)   Ht 5' 7" (1 702 m)   Wt 79 9 kg (176 lb 3 2 oz)   SpO2 97%   BMI 27 60 kg/m²      Physical Exam  Vitals signs and nursing note reviewed  Constitutional:       General: He is not in acute distress  Appearance: Normal appearance  He is well-developed  He is not ill-appearing, toxic-appearing or diaphoretic  HENT:      Head: Normocephalic and atraumatic  Nose: Nose normal       Mouth/Throat:      Mouth: Mucous membranes are moist    Eyes:      General: No scleral icterus  Conjunctiva/sclera: Conjunctivae normal       Pupils: Pupils are equal, round, and reactive to light  Neck:      Musculoskeletal: Normal range of motion and neck supple  No neck rigidity  Cardiovascular:      Rate and Rhythm: Normal rate and regular rhythm  Heart sounds: Normal heart sounds  No murmur  Pulmonary:      Effort: Pulmonary effort is normal  No respiratory distress  Breath sounds: Normal breath sounds  No wheezing, rhonchi or rales  Abdominal:      General: Bowel sounds are normal  There is no distension  Palpations: Abdomen is soft  There is no mass  Tenderness: There is no abdominal tenderness  There is no guarding or rebound  Musculoskeletal:      Right lower leg: No edema  Left lower leg: No edema  Lymphadenopathy:      Cervical: No cervical adenopathy  Skin:     General: Skin is warm and dry  Findings: No rash  Neurological:      Mental Status: He is alert and oriented to person, place, and time  Sensory: No sensory deficit  Motor: No weakness  Coordination: Coordination normal       Gait: Gait normal    Psychiatric:         Mood and Affect: Mood normal          Behavior: Behavior normal          Thought Content:  Thought content normal          Judgment: Judgment normal

## 2021-03-26 ENCOUNTER — IMMUNIZATIONS (OUTPATIENT)
Dept: FAMILY MEDICINE CLINIC | Facility: HOSPITAL | Age: 66
End: 2021-03-26

## 2021-03-26 DIAGNOSIS — Z23 ENCOUNTER FOR IMMUNIZATION: Primary | ICD-10-CM

## 2021-03-26 PROCEDURE — 0001A SARS-COV-2 / COVID-19 MRNA VACCINE (PFIZER-BIONTECH) 30 MCG: CPT

## 2021-03-26 PROCEDURE — 91300 SARS-COV-2 / COVID-19 MRNA VACCINE (PFIZER-BIONTECH) 30 MCG: CPT

## 2021-04-16 ENCOUNTER — IMMUNIZATIONS (OUTPATIENT)
Dept: FAMILY MEDICINE CLINIC | Facility: HOSPITAL | Age: 66
End: 2021-04-16

## 2021-04-16 DIAGNOSIS — Z23 ENCOUNTER FOR IMMUNIZATION: Primary | ICD-10-CM

## 2021-04-16 PROCEDURE — 91300 SARS-COV-2 / COVID-19 MRNA VACCINE (PFIZER-BIONTECH) 30 MCG: CPT

## 2021-04-16 PROCEDURE — 0002A SARS-COV-2 / COVID-19 MRNA VACCINE (PFIZER-BIONTECH) 30 MCG: CPT

## 2021-05-19 ENCOUNTER — OFFICE VISIT (OUTPATIENT)
Dept: FAMILY MEDICINE CLINIC | Facility: CLINIC | Age: 66
End: 2021-05-19
Payer: COMMERCIAL

## 2021-05-19 VITALS
OXYGEN SATURATION: 98 % | HEIGHT: 67 IN | DIASTOLIC BLOOD PRESSURE: 78 MMHG | TEMPERATURE: 97.5 F | HEART RATE: 66 BPM | BODY MASS INDEX: 27.94 KG/M2 | SYSTOLIC BLOOD PRESSURE: 138 MMHG | WEIGHT: 178 LBS

## 2021-05-19 DIAGNOSIS — Z12.5 SCREENING FOR PROSTATE CANCER: ICD-10-CM

## 2021-05-19 DIAGNOSIS — I10 ESSENTIAL HYPERTENSION: Primary | ICD-10-CM

## 2021-05-19 PROCEDURE — 1160F RVW MEDS BY RX/DR IN RCRD: CPT | Performed by: FAMILY MEDICINE

## 2021-05-19 PROCEDURE — 3078F DIAST BP <80 MM HG: CPT | Performed by: FAMILY MEDICINE

## 2021-05-19 PROCEDURE — 99213 OFFICE O/P EST LOW 20 MIN: CPT | Performed by: FAMILY MEDICINE

## 2021-05-19 PROCEDURE — 3725F SCREEN DEPRESSION PERFORMED: CPT | Performed by: FAMILY MEDICINE

## 2021-05-19 PROCEDURE — 1036F TOBACCO NON-USER: CPT | Performed by: FAMILY MEDICINE

## 2021-05-19 PROCEDURE — 3075F SYST BP GE 130 - 139MM HG: CPT | Performed by: FAMILY MEDICINE

## 2021-05-19 PROCEDURE — 3008F BODY MASS INDEX DOCD: CPT | Performed by: FAMILY MEDICINE

## 2021-05-19 NOTE — PROGRESS NOTES
Assessment/Plan:    No problem-specific Assessment & Plan notes found for this encounter  Diagnoses and all orders for this visit:    Essential hypertension  Comments:  no change in the medication pt is getting good numbers  Orders:  -     CBC and differential; Future  -     Comprehensive metabolic panel; Future  -     Lipid panel; Future  -     TSH, 3rd generation with Free T4 reflex; Future    Screening for prostate cancer  -     PSA, Total Screen; Future          PHQ-9 Depression Screening    PHQ-9:   Frequency of the following problems over the past two weeks:      Little interest or pleasure in doing things: 0 - not at all  Feeling down, depressed, or hopeless: 0 - not at all  PHQ-2 Score: 0            Subjective:      Patient ID: Luis Pascual is a 77 y o  male  Pt is checking Bps at home and seeing a good average    Hypertension  This is a chronic problem  The current episode started more than 1 year ago  The problem has been gradually improving since onset  The problem is controlled  Pertinent negatives include no chest pain, headaches or palpitations  There are no associated agents to hypertension  Risk factors for coronary artery disease include sedentary lifestyle and obesity  Past treatments include calcium channel blockers and angiotensin blockers  Compliance problems include diet and exercise  The following portions of the patient's history were reviewed and updated as appropriate: allergies, current medications, past family history, past medical history, past social history, past surgical history and problem list     Review of Systems   Eyes: Negative for visual disturbance  Cardiovascular: Negative for chest pain and palpitations  Neurological: Negative for headaches           Objective:    /78 (BP Location: Left arm, Patient Position: Sitting)   Pulse 66   Temp 97 5 °F (36 4 °C) (Tympanic)   Ht 5' 7" (1 702 m)   Wt 80 7 kg (178 lb)   SpO2 98%   BMI 27 88 kg/m² Physical Exam  Vitals signs and nursing note reviewed  Constitutional:       General: He is not in acute distress  Appearance: Normal appearance  He is not ill-appearing or diaphoretic  HENT:      Head: Normocephalic and atraumatic  Eyes:      Conjunctiva/sclera: Conjunctivae normal    Neck:      Musculoskeletal: Normal range of motion and neck supple  No neck rigidity  Cardiovascular:      Rate and Rhythm: Normal rate and regular rhythm  Heart sounds: Normal heart sounds  No murmur  Pulmonary:      Effort: Pulmonary effort is normal  No respiratory distress  Breath sounds: Normal breath sounds  No wheezing, rhonchi or rales  Abdominal:      General: There is no distension  Palpations: Abdomen is soft  There is no mass  Tenderness: There is no abdominal tenderness  There is no guarding or rebound  Musculoskeletal:      Right lower leg: No edema  Left lower leg: No edema  Lymphadenopathy:      Cervical: No cervical adenopathy  Neurological:      Mental Status: He is alert and oriented to person, place, and time  Psychiatric:         Mood and Affect: Mood normal          Behavior: Behavior normal          Thought Content:  Thought content normal          Judgment: Judgment normal

## 2021-07-08 DIAGNOSIS — Z76.0 MEDICATION REFILL: ICD-10-CM

## 2021-07-08 DIAGNOSIS — I10 HYPERTENSION: ICD-10-CM

## 2021-07-12 RX ORDER — LOSARTAN POTASSIUM 50 MG/1
50 TABLET ORAL DAILY
Qty: 90 TABLET | Refills: 3 | Status: SHIPPED | OUTPATIENT
Start: 2021-07-12 | End: 2022-08-03 | Stop reason: SDUPTHER

## 2021-07-26 ENCOUNTER — OFFICE VISIT (OUTPATIENT)
Dept: FAMILY MEDICINE CLINIC | Facility: CLINIC | Age: 66
End: 2021-07-26
Payer: COMMERCIAL

## 2021-07-26 VITALS
OXYGEN SATURATION: 95 % | SYSTOLIC BLOOD PRESSURE: 130 MMHG | DIASTOLIC BLOOD PRESSURE: 68 MMHG | BODY MASS INDEX: 27.15 KG/M2 | HEART RATE: 89 BPM | TEMPERATURE: 98 F | WEIGHT: 173 LBS | HEIGHT: 67 IN

## 2021-07-26 DIAGNOSIS — M70.22 OLECRANON BURSITIS OF BOTH ELBOWS: Primary | ICD-10-CM

## 2021-07-26 DIAGNOSIS — K62.5 RECTAL BLEEDING: ICD-10-CM

## 2021-07-26 DIAGNOSIS — M70.21 OLECRANON BURSITIS OF BOTH ELBOWS: Primary | ICD-10-CM

## 2021-07-26 PROCEDURE — 99213 OFFICE O/P EST LOW 20 MIN: CPT | Performed by: FAMILY MEDICINE

## 2021-07-26 PROCEDURE — 3725F SCREEN DEPRESSION PERFORMED: CPT | Performed by: FAMILY MEDICINE

## 2021-07-26 RX ORDER — PREDNISONE 10 MG/1
TABLET ORAL
Qty: 30 TABLET | Refills: 0 | Status: SHIPPED | OUTPATIENT
Start: 2021-07-26 | End: 2022-03-21 | Stop reason: ALTCHOICE

## 2021-07-26 NOTE — PROGRESS NOTES
Assessment/Plan:    No problem-specific Assessment & Plan notes found for this encounter  Diagnoses and all orders for this visit:    Olecranon bursitis of both elbows  -     predniSONE 10 mg tablet; 4 pills for 3 days, then 3 pills for 3 days, then 2 pills for 3 days, then 1 pills for 3 days, then stop    Rectal bleeding  -     Ambulatory referral to Gastroenterology; Future          PHQ-9 Depression Screening    PHQ-9:   Frequency of the following problems over the past two weeks:      Little interest or pleasure in doing things: 0 - not at all  Feeling down, depressed, or hopeless: 0 - not at all  PHQ-2 Score: 0            Subjective:      Patient ID: Leticia Holland is a 74 y o  male  Pt complains of bilateral elbow swelling without pain, pt was overusing his arms, pt also complains of rectal bleeding starting one week ago,      The following portions of the patient's history were reviewed and updated as appropriate: allergies, current medications, past family history, past medical history, past social history, past surgical history and problem list     Review of Systems   Constitutional: Negative for chills and fever  Respiratory: Negative for shortness of breath and wheezing  Objective:    /68 (BP Location: Left arm, Patient Position: Sitting, Cuff Size: Standard)   Pulse 89   Temp 98 °F (36 7 °C) (Tympanic)   Ht 5' 7" (1 702 m)   Wt 78 5 kg (173 lb)   SpO2 95%   BMI 27 10 kg/m²      Physical Exam  Vitals and nursing note reviewed  Constitutional:       General: He is not in acute distress  Appearance: Normal appearance  He is not ill-appearing or diaphoretic  HENT:      Head: Normocephalic and atraumatic  Eyes:      Conjunctiva/sclera: Conjunctivae normal    Cardiovascular:      Rate and Rhythm: Normal rate and regular rhythm  Heart sounds: Normal heart sounds  No murmur heard  Pulmonary:      Effort: Pulmonary effort is normal  No respiratory distress  Breath sounds: Normal breath sounds  No wheezing, rhonchi or rales  Musculoskeletal:      Cervical back: Normal range of motion and neck supple  No rigidity  Right lower leg: No edema  Left lower leg: No edema  Lymphadenopathy:      Cervical: No cervical adenopathy  Neurological:      Mental Status: He is alert and oriented to person, place, and time  Psychiatric:         Mood and Affect: Mood normal          Behavior: Behavior normal          Thought Content:  Thought content normal          Judgment: Judgment normal        Ortho Exam

## 2021-08-03 ENCOUNTER — CONSULT (OUTPATIENT)
Dept: GASTROENTEROLOGY | Facility: CLINIC | Age: 66
End: 2021-08-03
Payer: COMMERCIAL

## 2021-08-03 VITALS
SYSTOLIC BLOOD PRESSURE: 130 MMHG | BODY MASS INDEX: 27.31 KG/M2 | HEART RATE: 88 BPM | HEIGHT: 67 IN | WEIGHT: 174 LBS | DIASTOLIC BLOOD PRESSURE: 78 MMHG

## 2021-08-03 DIAGNOSIS — K62.5 RECTAL BLEEDING: ICD-10-CM

## 2021-08-03 PROCEDURE — 1036F TOBACCO NON-USER: CPT | Performed by: INTERNAL MEDICINE

## 2021-08-03 PROCEDURE — 3075F SYST BP GE 130 - 139MM HG: CPT | Performed by: INTERNAL MEDICINE

## 2021-08-03 PROCEDURE — 1160F RVW MEDS BY RX/DR IN RCRD: CPT | Performed by: INTERNAL MEDICINE

## 2021-08-03 PROCEDURE — 99204 OFFICE O/P NEW MOD 45 MIN: CPT | Performed by: INTERNAL MEDICINE

## 2021-08-03 PROCEDURE — 3008F BODY MASS INDEX DOCD: CPT | Performed by: INTERNAL MEDICINE

## 2021-08-03 PROCEDURE — 3078F DIAST BP <80 MM HG: CPT | Performed by: INTERNAL MEDICINE

## 2021-08-03 NOTE — H&P (VIEW-ONLY)
[facility] - Outpatient Consultation  Brandon Girard 74 y o  male MRN: [id number]  Encounter: [id number]        ASSESSMENT AND PLAN:      1  Rectal bleeding   may well represent hemorrhoidal bleeding, though differential diagnosis is broad  Will plan to evaluate further with colonoscopy  In the meantime he can maximize dietary fiber intake    - Colonoscopy; Future    ______________________________________________________________________    HPI:   Patient presents for evaluation of hematochezia  Over the past several months he has noted fairly frequent bright red blood with bowel movements  This is sometimes scant and only present when he wipes and sometimes noted with blood in the toilet  He has no other associated symptoms, namely no change in bowel habit, abdominal pain, anorectal pain, unexplained weight loss, nausea vomiting, fever chills, jaundice or rashes  He previously underwent colonoscopy many years ago though records are not available  More recently he had a negative Cologuard test in September 2020  No family history of colorectal cancer or IBD  Does have a history of prostate cancer, status post resection  No history of radiation      REVIEW OF SYSTEMS:    Review of Systems   All other systems reviewed and are negative  Historical Information   Past Medical History:   Diagnosis Date    Hypertension     Prostate cancer ([address] ) 2001    Stroke [facility])      Past Surgical History:   Procedure Laterality Date    PROSTATECTOMY  2001    TONSILLECTOMY       Social History   Social History     Substance and Sexual Activity   Alcohol Use Yes     Social History     Substance and Sexual Activity   Drug Use Yes    Types: Marijuana     Social History     Tobacco Use   Smoking Status Former Smoker   Smokeless Tobacco Never Used     History reviewed  No pertinent family history      Meds/Allergies       Current Outpatient Medications:     amLODIPine (NORVASC) 10 mg tablet   aspirin (ECOTRIN LOW STRENGTH) 81 mg EC tablet    losartan (COZAAR) 50 mg tablet    predniSONE 10 mg tablet    No Known Allergies        Objective     Blood pressure 130/78, pulse 88, height 5' 7" (1 702 m), weight 78 9 kg (174 lb)  Body mass index is 27 25 kg/m²  PHYSICAL EXAM:      Physical Exam  Vitals and nursing note reviewed  Constitutional:       General: He is not in acute distress  HENT:      Head: Normocephalic and atraumatic  Mouth/Throat:      Mouth: Mucous membranes are moist    Eyes:      General: No scleral icterus  Pupils: Pupils are equal, round, and reactive to light  Cardiovascular:      Rate and Rhythm: Normal rate and regular rhythm  Pulmonary:      Effort: Pulmonary effort is normal  No respiratory distress  Abdominal:      General: There is no distension  Palpations: Abdomen is soft  Tenderness: There is no abdominal tenderness  There is no guarding or rebound  Musculoskeletal:         General: Normal range of motion  Cervical back: Normal range of motion and neck supple  Skin:     General: Skin is warm and dry  Neurological:      General: No focal deficit present  Mental Status: He is alert and oriented to person, place, and time  Psychiatric:         Mood and Affect: Mood normal          Behavior: Behavior normal               Lab Results:   No visits with results within 1 Day(s) from this visit     Latest known visit with results is:   Lab on 09/04/2020   Component Date Value    WBC 09/08/2020 7 80     RBC 09/08/2020 5 02     Hemoglobin 09/08/2020 15 8     Hematocrit 09/08/2020 47 1     MCV 09/08/2020 94     MCH 09/08/2020 31 5     MCHC 09/08/2020 33 5     RDW 09/08/2020 12 1     MPV 09/08/2020 11 1     Platelets 37/69/9171 215     nRBC 09/08/2020 0     Neutrophils Relative 09/08/2020 59     Immat GRANS % 09/08/2020 1     Lymphocytes Relative 09/08/2020 23     Monocytes Relative 09/08/2020 7     Eosinophils Relative 09/08/2020 9*    Basophils Relative 09/08/2020 1     Neutrophils Absolute 09/08/2020 4 68     Immature Grans Absolute 09/08/2020 0 04     Lymphocytes Absolute 09/08/2020 1 79     Monocytes Absolute 09/08/2020 0 51     Eosinophils Absolute 09/08/2020 0 69*    Basophils Absolute 09/08/2020 0 09     Sodium 09/08/2020 139     Potassium 09/08/2020 3 8     Chloride 09/08/2020 108     CO2 09/08/2020 25     ANION GAP 09/08/2020 6     BUN 09/08/2020 12     Creatinine 09/08/2020 0 78     Glucose, Fasting 09/08/2020 98     Calcium 09/08/2020 9 0     AST 09/08/2020 18     ALT 09/08/2020 15     Alkaline Phosphatase 09/08/2020 66     Total Protein 09/08/2020 8 1     Albumin 09/08/2020 4 3     Total Bilirubin 09/08/2020 0 89     eGFR 09/08/2020 93     Cholesterol 09/08/2020 165     Triglycerides 09/08/2020 64     HDL, Direct 09/08/2020 49     LDL Calculated 09/08/2020 103*    Non-HDL-Chol (CHOL-HDL) 09/08/2020 116     TSH 3RD GENERATON 09/08/2020 2 330     PSA 09/08/2020 0 1          Radiology Results:   No results found

## 2021-08-03 NOTE — PROGRESS NOTES
[facility] - Outpatient Consultation  Néstor Renner 74 y o  male MRN: [id number]  Encounter: [id number]        ASSESSMENT AND PLAN:      1  Rectal bleeding   may well represent hemorrhoidal bleeding, though differential diagnosis is broad  Will plan to evaluate further with colonoscopy  In the meantime he can maximize dietary fiber intake    - Colonoscopy; Future    ______________________________________________________________________    HPI:   Patient presents for evaluation of hematochezia  Over the past several months he has noted fairly frequent bright red blood with bowel movements  This is sometimes scant and only present when he wipes and sometimes noted with blood in the toilet  He has no other associated symptoms, namely no change in bowel habit, abdominal pain, anorectal pain, unexplained weight loss, nausea vomiting, fever chills, jaundice or rashes  He previously underwent colonoscopy many years ago though records are not available  More recently he had a negative Cologuard test in September 2020  No family history of colorectal cancer or IBD  Does have a history of prostate cancer, status post resection  No history of radiation      REVIEW OF SYSTEMS:    Review of Systems   All other systems reviewed and are negative  Historical Information   Past Medical History:   Diagnosis Date    Hypertension     Prostate cancer ([facility] [address] ) 2001    Stroke [facility])      Past Surgical History:   Procedure Laterality Date    PROSTATECTOMY  2001    TONSILLECTOMY       Social History   Social History     Substance and Sexual Activity   Alcohol Use Yes     Social History     Substance and Sexual Activity   Drug Use Yes    Types: Marijuana     Social History     Tobacco Use   Smoking Status Former Smoker   Smokeless Tobacco Never Used     History reviewed  No pertinent family history      Meds/Allergies       Current Outpatient Medications:     amLODIPine (NORVASC) 10 mg tablet   aspirin (ECOTRIN LOW STRENGTH) 81 mg EC tablet    losartan (COZAAR) 50 mg tablet    predniSONE 10 mg tablet    No Known Allergies        Objective     Blood pressure 130/78, pulse 88, height 5' 7" (1 702 m), weight 78 9 kg (174 lb)  Body mass index is 27 25 kg/m²  PHYSICAL EXAM:      Physical Exam  Vitals and nursing note reviewed  Constitutional:       General: He is not in acute distress  HENT:      Head: Normocephalic and atraumatic  Mouth/Throat:      Mouth: Mucous membranes are moist    Eyes:      General: No scleral icterus  Pupils: Pupils are equal, round, and reactive to light  Cardiovascular:      Rate and Rhythm: Normal rate and regular rhythm  Pulmonary:      Effort: Pulmonary effort is normal  No respiratory distress  Abdominal:      General: There is no distension  Palpations: Abdomen is soft  Tenderness: There is no abdominal tenderness  There is no guarding or rebound  Musculoskeletal:         General: Normal range of motion  Cervical back: Normal range of motion and neck supple  Skin:     General: Skin is warm and dry  Neurological:      General: No focal deficit present  Mental Status: He is alert and oriented to person, place, and time  Psychiatric:         Mood and Affect: Mood normal          Behavior: Behavior normal               Lab Results:   No visits with results within 1 Day(s) from this visit     Latest known visit with results is:   Lab on 09/04/2020   Component Date Value    WBC 09/08/2020 7 80     RBC 09/08/2020 5 02     Hemoglobin 09/08/2020 15 8     Hematocrit 09/08/2020 47 1     MCV 09/08/2020 94     MCH 09/08/2020 31 5     MCHC 09/08/2020 33 5     RDW 09/08/2020 12 1     MPV 09/08/2020 11 1     Platelets 42/57/1433 215     nRBC 09/08/2020 0     Neutrophils Relative 09/08/2020 59     Immat GRANS % 09/08/2020 1     Lymphocytes Relative 09/08/2020 23     Monocytes Relative 09/08/2020 7     Eosinophils Relative 09/08/2020 9*    Basophils Relative 09/08/2020 1     Neutrophils Absolute 09/08/2020 4 68     Immature Grans Absolute 09/08/2020 0 04     Lymphocytes Absolute 09/08/2020 1 79     Monocytes Absolute 09/08/2020 0 51     Eosinophils Absolute 09/08/2020 0 69*    Basophils Absolute 09/08/2020 0 09     Sodium 09/08/2020 139     Potassium 09/08/2020 3 8     Chloride 09/08/2020 108     CO2 09/08/2020 25     ANION GAP 09/08/2020 6     BUN 09/08/2020 12     Creatinine 09/08/2020 0 78     Glucose, Fasting 09/08/2020 98     Calcium 09/08/2020 9 0     AST 09/08/2020 18     ALT 09/08/2020 15     Alkaline Phosphatase 09/08/2020 66     Total Protein 09/08/2020 8 1     Albumin 09/08/2020 4 3     Total Bilirubin 09/08/2020 0 89     eGFR 09/08/2020 93     Cholesterol 09/08/2020 165     Triglycerides 09/08/2020 64     HDL, Direct 09/08/2020 49     LDL Calculated 09/08/2020 103*    Non-HDL-Chol (CHOL-HDL) 09/08/2020 116     TSH 3RD GENERATON 09/08/2020 2 330     PSA 09/08/2020 0 1          Radiology Results:   No results found

## 2021-08-12 ENCOUNTER — ANESTHESIA (OUTPATIENT)
Dept: GASTROENTEROLOGY | Facility: AMBULATORY SURGERY CENTER | Age: 66
End: 2021-08-12

## 2021-08-12 ENCOUNTER — HOSPITAL ENCOUNTER (OUTPATIENT)
Dept: GASTROENTEROLOGY | Facility: AMBULATORY SURGERY CENTER | Age: 66
Discharge: HOME | End: 2021-08-12
Payer: COMMERCIAL

## 2021-08-12 ENCOUNTER — ANESTHESIA EVENT (OUTPATIENT)
Dept: GASTROENTEROLOGY | Facility: AMBULATORY SURGERY CENTER | Age: 66
End: 2021-08-12

## 2021-08-12 VITALS
RESPIRATION RATE: 18 BRPM | WEIGHT: 176 LBS | OXYGEN SATURATION: 98 % | HEART RATE: 65 BPM | BODY MASS INDEX: 26.07 KG/M2 | DIASTOLIC BLOOD PRESSURE: 73 MMHG | TEMPERATURE: 97.5 F | HEIGHT: 69 IN | SYSTOLIC BLOOD PRESSURE: 118 MMHG

## 2021-08-12 DIAGNOSIS — K62.5 RECTAL BLEEDING: ICD-10-CM

## 2021-08-12 PROBLEM — I63.9 CVA (CEREBRAL VASCULAR ACCIDENT) (HCC): Status: ACTIVE | Noted: 2021-08-12

## 2021-08-12 PROCEDURE — 00811 ANES LWR INTST NDSC NOS: CPT | Performed by: NURSE ANESTHETIST, CERTIFIED REGISTERED

## 2021-08-12 PROCEDURE — 88305 TISSUE EXAM BY PATHOLOGIST: CPT | Performed by: PATHOLOGY

## 2021-08-12 PROCEDURE — 45380 COLONOSCOPY AND BIOPSY: CPT | Performed by: INTERNAL MEDICINE

## 2021-08-12 RX ORDER — SODIUM CHLORIDE 9 MG/ML
20 INJECTION, SOLUTION INTRAVENOUS CONTINUOUS
Status: DISCONTINUED | OUTPATIENT
Start: 2021-08-12 | End: 2021-08-16 | Stop reason: HOSPADM

## 2021-08-12 RX ORDER — SODIUM CHLORIDE 9 MG/ML
30 INJECTION, SOLUTION INTRAVENOUS CONTINUOUS
Status: DISCONTINUED | OUTPATIENT
Start: 2021-08-12 | End: 2021-08-16 | Stop reason: HOSPADM

## 2021-08-12 RX ORDER — SODIUM CHLORIDE 9 MG/ML
50 INJECTION, SOLUTION INTRAVENOUS CONTINUOUS
Status: DISCONTINUED | OUTPATIENT
Start: 2021-08-12 | End: 2021-08-16 | Stop reason: HOSPADM

## 2021-08-12 RX ORDER — PROPOFOL 10 MG/ML
INJECTION, EMULSION INTRAVENOUS AS NEEDED
Status: DISCONTINUED | OUTPATIENT
Start: 2021-08-12 | End: 2021-08-12

## 2021-08-12 RX ORDER — SODIUM CHLORIDE 9 MG/ML
INJECTION, SOLUTION INTRAVENOUS CONTINUOUS PRN
Status: DISCONTINUED | OUTPATIENT
Start: 2021-08-12 | End: 2021-08-12

## 2021-08-12 RX ADMIN — PROPOFOL 20 MG: 10 INJECTION, EMULSION INTRAVENOUS at 13:36

## 2021-08-12 RX ADMIN — PROPOFOL 40 MG: 10 INJECTION, EMULSION INTRAVENOUS at 13:34

## 2021-08-12 RX ADMIN — PROPOFOL 20 MG: 10 INJECTION, EMULSION INTRAVENOUS at 13:44

## 2021-08-12 RX ADMIN — PROPOFOL 20 MG: 10 INJECTION, EMULSION INTRAVENOUS at 13:38

## 2021-08-12 RX ADMIN — PROPOFOL 40 MG: 10 INJECTION, EMULSION INTRAVENOUS at 13:32

## 2021-08-12 RX ADMIN — PROPOFOL 20 MG: 10 INJECTION, EMULSION INTRAVENOUS at 13:42

## 2021-08-12 RX ADMIN — PROPOFOL 20 MG: 10 INJECTION, EMULSION INTRAVENOUS at 13:40

## 2021-08-12 RX ADMIN — SODIUM CHLORIDE: 9 INJECTION, SOLUTION INTRAVENOUS at 13:25

## 2021-08-12 RX ADMIN — PROPOFOL 100 MG: 10 INJECTION, EMULSION INTRAVENOUS at 13:30

## 2021-08-12 NOTE — INTERVAL H&P NOTE
H&P reviewed  After examining the patient I find no changes in the patients condition since the H&P had been written      Vitals:    08/12/21 1251   BP: 148/89   Pulse: 67   Resp: 18   Temp: 97 5 °F (36 4 °C)   SpO2: 99%

## 2021-08-12 NOTE — DISCHARGE INSTRUCTIONS
High Fiber Diet   WHAT YOU NEED TO KNOW:   What is a high-fiber diet? A high-fiber diet includes foods that have a high amount of fiber  Fiber is the part of fruits, vegetables, and grains that is not broken down by your body  Fiber keeps your bowel movements regular  Fiber can also help lower your cholesterol level, control blood sugar in people with diabetes, and relieve constipation  Fiber can also help you control your weight because it helps you feel full faster  Most adults should eat 25 to 35 grams of fiber each day  Talk to your dietitian or healthcare provider about the amount of fiber you need  What foods are good sources of fiber? · Foods with at least 4 grams of fiber per serving:      ? ? to ½ cup of high-fiber cereal (check the nutrition label on the box)    ? ½ cup of blackberries or raspberries    ? 4 dried prunes    ? 1 cooked artichoke    ? ½ cup of cooked legumes, such as lentils, or red, kidney, and ortiz beans    · Foods with 1 to 3 grams of fiber per serving:      ? 1 slice of whole-wheat, pumpernickel, or rye bread    ? ½ cup of cooked brown rice    ? 4 whole-wheat crackers    ? 1 cup of oatmeal    ? ½ cup of cereal with 1 to 3 grams of fiber per serving (check the nutrition label on the box)    ? 1 small piece of fruit, such as an apple, banana, pear, kiwi, or orange    ? 3 dates    ? ½ cup of canned apricots, fruit cocktail, peaches, or pears    ? ½ cup of raw or cooked vegetables, such as carrots, cauliflower, cabbage, spinach, squash, or corn  What are some ways that I can increase fiber in my diet? · Choose brown or wild rice instead of white rice  · Use whole wheat flour in recipes instead of white or all-purpose flour  · Add beans and peas to casseroles or soups  · Choose fresh fruit and vegetables with peels or skins on instead of juices  What other guidelines should I follow? · Add fiber to your diet slowly    You may have abdominal discomfort, bloating, and gas if you add fiber to your diet too quickly  · Drink plenty of liquids as you add fiber to your diet  You may have nausea or develop constipation if you do not drink enough water  Ask how much liquid to drink each day and which liquids are best for you  CARE AGREEMENT:   You have the right to help plan your care  Discuss treatment options with your healthcare provider to decide what care you want to receive  You always have the right to refuse treatment  The above information is an  only  It is not intended as medical advice for individual conditions or treatments  Talk to your doctor, nurse or pharmacist before following any medical regimen to see if it is safe and effective for you  © Copyright [address] 2021 Information is for End User's use only and may not be sold, redistributed or otherwise used for commercial purposes  All illustrations and images included in CareNotes® are the copyrighted property of [employer]  or [facility] Guillermo Sarkar   Hemorrhoids   WHAT YOU NEED TO KNOW:   What are hemorrhoids? Hemorrhoids are swollen blood vessels inside your rectum (internal hemorrhoids) or on your anus (external hemorrhoids)  Sometimes a hemorrhoid may prolapse  This means it extends out of your anus  What increases my risk for hemorrhoids? · Pregnancy or obesity    · Straining or sitting for a long time during bowel movements    · Liver disease    · Weak muscles around the anus caused by older age, rectal surgery, or anal intercourse    · A lack of physical activity    · Chronic diarrhea or constipation    · A low-fiber diet    What are the signs and symptoms of hemorrhoids?    · Pain or itching around your anus or inside your rectum    · Swelling or bumps around your anus    · Bright red blood in your bowel movement, on the toilet paper, or in the toilet bowl    · Tissue bulging out of your anus (prolapsed hemorrhoids)    · Incontinence (poor control over urine or bowel movements)    How are hemorrhoids diagnosed? Your healthcare provider will ask about your symptoms, the foods you eat, and your bowel movements  He or she will examine your anus for external hemorrhoids  You may need the following:  · A digital rectal exam  is a test to check for hemorrhoids  Your healthcare provider will put a gloved finger inside your anus to feel for the hemorrhoids  · An anoscopy  is a test that uses a scope (small tube with a light and camera on the end) to look at your hemorrhoids  How are hemorrhoids treated? Treatment will depend on your symptoms  You may need any of the following:  · Medicines  can help decrease pain and swelling, and soften your bowel movement  The medicine may be a pill, pad, cream, or ointment  · Procedures  may be used to shrink or remove your hemorrhoid  Examples include rubber-band ligation, sclerotherapy, and photocoagulation  These procedures may be done in your healthcare provider's office  Ask your healthcare provider for more information about these procedures  · Surgery  may be needed to shrink or remove your hemorrhoids  How can I manage my symptoms? · Apply ice on your anus for 15 to 20 minutes every hour or as directed  Use an ice pack, or put crushed ice in a plastic bag  Cover it with a towel before you apply it to your anus  Ice helps prevent tissue damage and decreases swelling and pain  · Take a sitz bath  Fill a bathtub with 4 to 6 inches of warm water  You may also use a sitz bath pan that fits inside a toilet bowl  Sit in the sitz bath for 15 minutes  Do this 3 times a day, and after each bowel movement  The warm water can help decrease pain and swelling  · Keep your anal area clean  Gently wash the area with warm water daily  Soap may irritate the area  After a bowel movement, wipe with moist towelettes or wet toilet paper  Dry toilet paper can irritate the area  How can I help prevent hemorrhoids?    · Do not strain to have a bowel movement  Do not sit on the toilet too long  These actions can increase pressure on the tissues in your rectum and anus  · Drink plenty of liquids  Liquids can help prevent constipation  Ask how much liquid to drink each day and which liquids are best for you  · Eat a variety of high-fiber foods  Examples include fruits, vegetables, and whole grains  Ask your healthcare provider how much fiber you need each day  You may need to take a fiber supplement  · Exercise as directed  Exercise, such as walking, may make it easier to have a bowel movement  Ask your healthcare provider to help you create an exercise plan  · Do not have anal sex  Anal sex can weaken the skin around your rectum and anus  · Avoid heavy lifting  This can cause straining and increase your risk for another hemorrhoid  When should I seek immediate care? · You have severe pain in your rectum or around your anus  · You have severe pain in your abdomen and you are vomiting  · You have bleeding from your anus that soaks through your underwear  When should I contact my healthcare provider? · You have frequent and painful bowel movements  · Your hemorrhoid looks or feels more swollen than usual      · You do not have a bowel movement for 2 days or more  · You see or feel tissue coming through your anus  · You have questions or concerns about your condition or care  CARE AGREEMENT:   You have the right to help plan your care  Learn about your health condition and how it may be treated  Discuss treatment options with your healthcare providers to decide what care you want to receive  You always have the right to refuse treatment  The above information is an  only  It is not intended as medical advice for individual conditions or treatments  Talk to your doctor, nurse or pharmacist before following any medical regimen to see if it is safe and effective for you    © [address] [place] [address] 2021 Information is for End User's use only and may not be sold, redistributed or otherwise used for commercial purposes  All illustrations and images included in CareNotes® are the copyrighted property of [employer]  or [employer]  Colorectal Polyps   WHAT YOU NEED TO KNOW:   Colorectal polyps are small growths of tissue in the lining of the colon and rectum  Most polyps are hyperplastic polyps and are usually benign (noncancerous)  Certain types of polyps, called adenomatous polyps, may turn into cancer  DISCHARGE INSTRUCTIONS:   Follow up with your healthcare provider or gastroenterologist as directed: You may need to return for more tests, such as another colonoscopy  Write down your questions so you remember to ask them during your visits  Reduce your risk for colorectal polyps:   · Eat a variety of healthy foods:  Healthy foods include fruit, vegetables, whole-grain breads, low-fat dairy products, beans, lean meat, and fish  Ask if you need to be on a special diet  · Maintain a healthy weight:  Ask your healthcare provider if you need to lose weight and how much you need to lose  Ask for help with a weight loss program     · Exercise:  Begin to exercise slowly and do more as you get stronger  Talk with your healthcare provider before you start an exercise program      · Limit alcohol:  Your risk for polyps increases the more you drink  · Do not smoke: If you smoke, it is never too late to quit  Ask for information about how to stop  For support and more information:   · Anam Xie ([facility])  [address], [place] [address]  Phone: [phone]  Web Address: www digestive  niddk nih gov    Contact your healthcare provider or gastroenterologist if:   · You have a fever  · You have chills, a cough, or feel weak and achy      · You have abdominal pain that does not go away or gets worse after you take medicine  · Your abdomen is swollen  · You are losing weight without trying  · You have questions or concerns about your condition or care  Seek care immediately or call 911 if:   · You have sudden shortness of breath  · You have a fast heart rate, fast breathing, or are too dizzy to stand up  · You have severe abdominal pain  · You see blood in your bowel movement  © Copyright [address] Information is for End User's use only and may not be sold, redistributed or otherwise used for commercial purposes  All illustrations and images included in CareNotes® are the copyrighted property of A D A M , Inc  or [facility]  The above information is an  only  It is not intended as medical advice for individual conditions or treatments  Talk to your doctor, nurse or pharmacist before following any medical regimen to see if it is safe and effective for you  Colonoscopy   WHAT YOU NEED TO KNOW:   A colonoscopy is a procedure to examine the inside of your colon (intestine) with a scope  Polyps or tissue growths may have been removed during your colonoscopy  It is normal to feel bloated and to have some abdominal discomfort  You should be passing gas  If you have hemorrhoids or you had polyps removed, you may have a small amount of bleeding  DISCHARGE INSTRUCTIONS:   Seek care immediately if:    You have sudden, severe abdominal pain   You have problems swallowing   You have a large amount of black, sticky bowel movements or blood in your bowel movements   You have sudden trouble breathing   You feel weak, lightheaded, or faint or your heart beats faster than normal for you  Contact your healthcare provider if:    You have a fever and chills   You have nausea or are vomiting   Your abdomen is bloated or feels full and hard   You have abdominal pain      You have black, sticky bowel movements or blood in your bowel movements   You have not had a bowel movement for 3 days after your procedure   You have rash or hives   You have questions or concerns about your procedure  Activity:    Do not lift, strain, or run for 24 hours after your procedure   Rest after your procedure  You have been given medicine to relax you  Do not drive or make important decisions until the day after your procedure  Return to your normal activity as directed   Relieve gas and discomfort from bloating by lying on your right side with a heating pad on your abdomen  You may need to take short walks to help the gas move out  Eat small meals until bloating is relieved  Follow up with your healthcare provider as directed: Write down your questions so you remember to ask them during your visits  If you take a blood thinner, please review the specific instructions from your endoscopist about when you should resume it  These can be found in the Recommendation and Your Medication list sections of this After Visit Summary

## 2021-08-12 NOTE — ANESTHESIA POSTPROCEDURE EVALUATION
Post-Op Assessment Note    CV Status:  Stable  Pain Score: 0    Pain management: adequate     Mental Status:  Alert and awake   Hydration Status:  Euvolemic   PONV Controlled:  Controlled   Airway Patency:  Patent      Post Op Vitals Reviewed: Yes      Staff: CRNA         No complications documented      BP   110/64   Temp      Pulse  66   Resp   18   SpO2   99%

## 2021-08-12 NOTE — ANESTHESIA PREPROCEDURE EVALUATION
Procedure:  COLONOSCOPY    Relevant Problems   CARDIO   (+) Essential hypertension      NEURO/PSYCH   (+) CVA (cerebral vascular accident) [facility])        Physical Exam    Airway    Mallampati score: II  TM Distance: >3 FB  Neck ROM: full     Dental       Cardiovascular  Rhythm: regular, Rate: normal,     Pulmonary  Breath sounds clear to auscultation,     Other Findings        Anesthesia Plan  ASA Score- 3     Anesthesia Type- IV sedation with anesthesia with ASA Monitors  Additional Monitors:   Airway Plan:           Plan Factors-Exercise tolerance (METS): >4 METS  Chart reviewed  Patient summary reviewed  Patient is not a current smoker  Induction-     Postoperative Plan-     Informed Consent- Anesthetic plan and risks discussed with patient

## 2021-09-09 NOTE — RESULT ENCOUNTER NOTE
Please call the patient regarding his result  Biopsies all benign  No suggestion of Crohn's disease or chronic inflammation    Repeat colonoscopy in 10 years

## 2021-09-20 ENCOUNTER — OFFICE VISIT (OUTPATIENT)
Dept: FAMILY MEDICINE CLINIC | Facility: CLINIC | Age: 66
End: 2021-09-20
Payer: COMMERCIAL

## 2021-09-20 VITALS
DIASTOLIC BLOOD PRESSURE: 70 MMHG | SYSTOLIC BLOOD PRESSURE: 112 MMHG | HEIGHT: 69 IN | HEART RATE: 66 BPM | WEIGHT: 177.25 LBS | TEMPERATURE: 96.5 F | OXYGEN SATURATION: 98 % | BODY MASS INDEX: 26.25 KG/M2

## 2021-09-20 DIAGNOSIS — E78.00 HYPERCHOLESTEROLEMIA: ICD-10-CM

## 2021-09-20 DIAGNOSIS — Z23 ENCOUNTER FOR IMMUNIZATION: ICD-10-CM

## 2021-09-20 DIAGNOSIS — I10 ESSENTIAL HYPERTENSION: ICD-10-CM

## 2021-09-20 DIAGNOSIS — Z00.00 ANNUAL PHYSICAL EXAM: Primary | ICD-10-CM

## 2021-09-20 PROCEDURE — 3008F BODY MASS INDEX DOCD: CPT | Performed by: FAMILY MEDICINE

## 2021-09-20 PROCEDURE — 90472 IMMUNIZATION ADMIN EACH ADD: CPT

## 2021-09-20 PROCEDURE — 99397 PER PM REEVAL EST PAT 65+ YR: CPT | Performed by: FAMILY MEDICINE

## 2021-09-20 PROCEDURE — 1036F TOBACCO NON-USER: CPT | Performed by: FAMILY MEDICINE

## 2021-09-20 PROCEDURE — 3725F SCREEN DEPRESSION PERFORMED: CPT | Performed by: FAMILY MEDICINE

## 2021-09-20 PROCEDURE — 90471 IMMUNIZATION ADMIN: CPT

## 2021-09-20 PROCEDURE — 4040F PNEUMOC VAC/ADMIN/RCVD: CPT | Performed by: FAMILY MEDICINE

## 2021-09-20 PROCEDURE — 1160F RVW MEDS BY RX/DR IN RCRD: CPT | Performed by: FAMILY MEDICINE

## 2021-09-20 PROCEDURE — 90732 PPSV23 VACC 2 YRS+ SUBQ/IM: CPT

## 2021-09-20 PROCEDURE — 90662 IIV NO PRSV INCREASED AG IM: CPT

## 2021-09-20 NOTE — PROGRESS NOTES
Assessment/Plan:    No problem-specific Assessment & Plan notes found for this encounter  Diagnoses and all orders for this visit:    Annual physical exam    Essential hypertension  Comments:  no chnage in the medication    Hypercholesterolemia  Comments:  pt counseled on diet and exercise    Encounter for immunization  -     PNEUMOCOCCAL POLYSACCHARIDE VACCINE 23-VALENT =>1YO SQ IM  -     influenza vaccine, high-dose, PF 0 7 mL (FLUZONE HIGH-DOSE)          PHQ-9 Depression Screening    PHQ-9:   Frequency of the following problems over the past two weeks:      Little interest or pleasure in doing things: 0 - not at all  Feeling down, depressed, or hopeless: 0 - not at all  PHQ-2 Score: 0            Subjective:      Patient ID: Shilpa Berrios is a 74 y o  male  Pt here for annual physical      The following portions of the patient's history were reviewed and updated as appropriate: allergies, current medications, past family history, past medical history, past social history, past surgical history and problem list     Review of Systems   Constitutional: Negative  Negative for chills, fatigue and fever  HENT: Negative  Negative for hearing loss  Eyes: Negative  Negative for visual disturbance  Respiratory: Negative for shortness of breath and wheezing  Cardiovascular: Negative for chest pain and palpitations  Gastrointestinal: Negative for abdominal pain, blood in stool, constipation, diarrhea, nausea and vomiting  Endocrine: Negative  Genitourinary: Negative for difficulty urinating and dysuria  Musculoskeletal: Negative for arthralgias and myalgias  Skin: Negative  Allergic/Immunologic: Negative  Neurological: Negative for seizures and syncope  Hematological: Negative for adenopathy  Psychiatric/Behavioral: Negative            Objective:    /70 (BP Location: Left arm, Patient Position: Sitting, Cuff Size: Standard)   Pulse 66   Temp (!) 96 5 °F (35 8 °C) (Tympanic)  5' 9" (1 753 m)   Wt 80 4 kg (177 lb 4 oz)   SpO2 98%   BMI 26 18 kg/m²      Physical Exam  Vitals and nursing note reviewed  Constitutional:       General: He is not in acute distress  Appearance: Normal appearance  He is well-developed  He is not ill-appearing, toxic-appearing or diaphoretic  HENT:      Head: Normocephalic and atraumatic  Right Ear: Tympanic membrane, ear canal and external ear normal  There is no impacted cerumen  Left Ear: Tympanic membrane, ear canal and external ear normal  There is no impacted cerumen  Nose: Nose normal  No congestion or rhinorrhea  Mouth/Throat:      Mouth: Mucous membranes are moist       Pharynx: Oropharynx is clear  No oropharyngeal exudate or posterior oropharyngeal erythema  Eyes:      General: No scleral icterus  Right eye: No discharge  Left eye: No discharge  Extraocular Movements: Extraocular movements intact  Conjunctiva/sclera: Conjunctivae normal       Pupils: Pupils are equal, round, and reactive to light  Cardiovascular:      Rate and Rhythm: Normal rate and regular rhythm  Pulses: Normal pulses  Heart sounds: Normal heart sounds  No murmur heard  No friction rub  No gallop  Pulmonary:      Effort: Pulmonary effort is normal  No respiratory distress  Breath sounds: Normal breath sounds  No wheezing, rhonchi or rales  Abdominal:      General: Bowel sounds are normal  There is no distension  Palpations: Abdomen is soft  There is no mass  Tenderness: There is no abdominal tenderness  There is no guarding or rebound  Musculoskeletal:         General: No swelling or deformity  Normal range of motion  Cervical back: Normal range of motion and neck supple  No rigidity  Right lower leg: No edema  Left lower leg: No edema  Lymphadenopathy:      Cervical: No cervical adenopathy  Skin:     General: Skin is warm and dry  Findings: No rash  Neurological:      General: No focal deficit present  Mental Status: He is alert and oriented to person, place, and time  Sensory: No sensory deficit  Motor: No weakness  Coordination: Coordination normal       Gait: Gait normal       Deep Tendon Reflexes: Reflexes are normal and symmetric  Reflexes normal    Psychiatric:         Mood and Affect: Mood normal          Behavior: Behavior normal          Thought Content:  Thought content normal          Judgment: Judgment normal

## 2021-12-06 DIAGNOSIS — I10 ESSENTIAL HYPERTENSION: ICD-10-CM

## 2021-12-06 RX ORDER — AMLODIPINE BESYLATE 10 MG/1
10 TABLET ORAL DAILY
Qty: 90 TABLET | Refills: 1 | Status: SHIPPED | OUTPATIENT
Start: 2021-12-06 | End: 2022-07-07

## 2022-03-21 ENCOUNTER — OFFICE VISIT (OUTPATIENT)
Dept: FAMILY MEDICINE CLINIC | Facility: CLINIC | Age: 67
End: 2022-03-21
Payer: COMMERCIAL

## 2022-03-21 VITALS
OXYGEN SATURATION: 99 % | HEIGHT: 69 IN | HEART RATE: 74 BPM | BODY MASS INDEX: 26.66 KG/M2 | DIASTOLIC BLOOD PRESSURE: 80 MMHG | SYSTOLIC BLOOD PRESSURE: 126 MMHG | TEMPERATURE: 97.1 F | WEIGHT: 180 LBS

## 2022-03-21 DIAGNOSIS — Z12.5 SCREENING FOR PROSTATE CANCER: ICD-10-CM

## 2022-03-21 DIAGNOSIS — I10 ESSENTIAL HYPERTENSION: Primary | ICD-10-CM

## 2022-03-21 DIAGNOSIS — E66.3 OVERWEIGHT (BMI 25.0-29.9): ICD-10-CM

## 2022-03-21 DIAGNOSIS — E78.00 HYPERCHOLESTEROLEMIA: ICD-10-CM

## 2022-03-21 DIAGNOSIS — Z11.59 NEED FOR HEPATITIS C SCREENING TEST: ICD-10-CM

## 2022-03-21 PROCEDURE — 1160F RVW MEDS BY RX/DR IN RCRD: CPT | Performed by: FAMILY MEDICINE

## 2022-03-21 PROCEDURE — 3725F SCREEN DEPRESSION PERFORMED: CPT | Performed by: FAMILY MEDICINE

## 2022-03-21 PROCEDURE — 99214 OFFICE O/P EST MOD 30 MIN: CPT | Performed by: FAMILY MEDICINE

## 2022-03-21 PROCEDURE — 1036F TOBACCO NON-USER: CPT | Performed by: FAMILY MEDICINE

## 2022-03-21 PROCEDURE — 3008F BODY MASS INDEX DOCD: CPT | Performed by: FAMILY MEDICINE

## 2022-03-21 NOTE — PROGRESS NOTES
Assessment/Plan:    No problem-specific Assessment & Plan notes found for this encounter  Diagnoses and all orders for this visit:    Essential hypertension  Comments:  no change in the medication  Orders:  -     CBC and differential; Future  -     TSH, 3rd generation with Free T4 reflex; Future    Need for hepatitis C screening test  -     Hepatitis C Antibody ([facility]); Future    Overweight (BMI 25 0-29  9)  Comments:  pt counseled on diet and exercise    Screening for prostate cancer  -     PSA, Total Screen; Future    Hypercholesterolemia  Comments:  pt counseled on diet and exercise  Orders:  -     Comprehensive metabolic panel; Future  -     Lipid panel; Future          PHQ-2/9 Depression Screening    Little interest or pleasure in doing things: 0 - not at all  Feeling down, depressed, or hopeless: 0 - not at all  PHQ-2 Score: 0  PHQ-2 Interpretation: Negative depression screen            Subjective:      Patient ID: Hunter Sparks is a 74 y o  male  Hypertension  This is a chronic problem  The current episode started more than 1 year ago  The problem is controlled  Pertinent negatives include no chest pain, headaches or palpitations  There are no associated agents to hypertension  Risk factors for coronary artery disease include male gender and sedentary lifestyle  Past treatments include angiotensin blockers and calcium channel blockers  Compliance problems include diet and exercise  The following portions of the patient's history were reviewed and updated as appropriate: allergies, current medications, past family history, past medical history, past social history, past surgical history and problem list     Review of Systems   Eyes: Negative for visual disturbance  Cardiovascular: Negative for chest pain and palpitations  Neurological: Negative for headaches           Objective:    /80 (BP Location: Left arm, Patient Position: Sitting, Cuff Size: Large)   Pulse 74   Temp (!) 97 1 °F (36 2 °C) (Tympanic)   Ht 5' 9" (1 753 m)   Wt 81 6 kg (180 lb)   SpO2 99%   BMI 26 58 kg/m²      Physical Exam  Vitals and nursing note reviewed  Constitutional:       General: He is not in acute distress  Appearance: Normal appearance  He is not ill-appearing, toxic-appearing or diaphoretic  HENT:      Head: Normocephalic and atraumatic  Eyes:      Conjunctiva/sclera: Conjunctivae normal    Cardiovascular:      Rate and Rhythm: Normal rate and regular rhythm  Heart sounds: Normal heart sounds  No murmur heard  Pulmonary:      Effort: Pulmonary effort is normal  No respiratory distress  Breath sounds: Normal breath sounds  No wheezing, rhonchi or rales  Abdominal:      General: There is no distension  Palpations: Abdomen is soft  There is no mass  Tenderness: There is no abdominal tenderness  There is no guarding or rebound  Musculoskeletal:      Cervical back: Normal range of motion and neck supple  No rigidity  Right lower leg: No edema  Left lower leg: No edema  Lymphadenopathy:      Cervical: No cervical adenopathy  Neurological:      Mental Status: He is alert and oriented to person, place, and time  Psychiatric:         Mood and Affect: Mood normal          Behavior: Behavior normal          Thought Content: Thought content normal          Judgment: Judgment normal        BMI Counseling: Body mass index is 26 58 kg/m²  The BMI is above normal  Nutrition recommendations include reducing portion sizes and 3-5 servings of fruits/vegetables daily  Exercise recommendations include moderate aerobic physical activity for 150 minutes/week and exercising 3-5 times per week

## 2022-07-06 DIAGNOSIS — I10 ESSENTIAL HYPERTENSION: ICD-10-CM

## 2022-07-07 DIAGNOSIS — I10 ESSENTIAL HYPERTENSION: ICD-10-CM

## 2022-07-07 RX ORDER — AMLODIPINE BESYLATE 10 MG/1
TABLET ORAL
Qty: 90 TABLET | Refills: 0 | Status: SHIPPED | OUTPATIENT
Start: 2022-07-07 | End: 2022-10-18 | Stop reason: SDUPTHER

## 2022-07-07 RX ORDER — AMLODIPINE BESYLATE 10 MG/1
10 TABLET ORAL DAILY
Qty: 90 TABLET | Refills: 1 | OUTPATIENT
Start: 2022-07-07

## 2022-08-03 DIAGNOSIS — I10 HYPERTENSION: ICD-10-CM

## 2022-08-03 DIAGNOSIS — Z76.0 MEDICATION REFILL: ICD-10-CM

## 2022-08-03 RX ORDER — LOSARTAN POTASSIUM 50 MG/1
50 TABLET ORAL DAILY
Qty: 90 TABLET | Refills: 3 | Status: SHIPPED | OUTPATIENT
Start: 2022-08-03

## 2022-09-12 LAB
HCV AB SER-ACNC: <15
HCV AB SER-ACNC: POSITIVE

## 2022-09-22 ENCOUNTER — OFFICE VISIT (OUTPATIENT)
Dept: FAMILY MEDICINE CLINIC | Facility: CLINIC | Age: 67
End: 2022-09-22
Payer: COMMERCIAL

## 2022-09-22 VITALS
TEMPERATURE: 97 F | DIASTOLIC BLOOD PRESSURE: 70 MMHG | BODY MASS INDEX: 26.11 KG/M2 | HEIGHT: 69 IN | SYSTOLIC BLOOD PRESSURE: 122 MMHG | OXYGEN SATURATION: 97 % | HEART RATE: 66 BPM | WEIGHT: 176.25 LBS

## 2022-09-22 DIAGNOSIS — K42.9 UMBILICAL HERNIA WITHOUT OBSTRUCTION AND WITHOUT GANGRENE: ICD-10-CM

## 2022-09-22 DIAGNOSIS — Z00.00 ANNUAL PHYSICAL EXAM: Primary | ICD-10-CM

## 2022-09-22 DIAGNOSIS — I10 ESSENTIAL HYPERTENSION: ICD-10-CM

## 2022-09-22 DIAGNOSIS — Z13.31 NEGATIVE DEPRESSION SCREENING: ICD-10-CM

## 2022-09-22 DIAGNOSIS — E66.3 OVERWEIGHT (BMI 25.0-29.9): ICD-10-CM

## 2022-09-22 PROCEDURE — 99213 OFFICE O/P EST LOW 20 MIN: CPT | Performed by: FAMILY MEDICINE

## 2022-09-22 PROCEDURE — 1160F RVW MEDS BY RX/DR IN RCRD: CPT | Performed by: FAMILY MEDICINE

## 2022-09-22 PROCEDURE — 1101F PT FALLS ASSESS-DOCD LE1/YR: CPT | Performed by: FAMILY MEDICINE

## 2022-09-22 PROCEDURE — 3288F FALL RISK ASSESSMENT DOCD: CPT | Performed by: FAMILY MEDICINE

## 2022-09-22 PROCEDURE — 3725F SCREEN DEPRESSION PERFORMED: CPT | Performed by: FAMILY MEDICINE

## 2022-09-22 PROCEDURE — 99397 PER PM REEVAL EST PAT 65+ YR: CPT | Performed by: FAMILY MEDICINE

## 2022-09-22 NOTE — PROGRESS NOTES
Assessment/Plan:    No problem-specific Assessment & Plan notes found for this encounter  Diagnoses and all orders for this visit:    Annual physical exam    Essential hypertension  Comments:  no change in the medication    Negative depression screening    Overweight (BMI 25 0-29  9)  Comments:  pt counseled on diet and exercise    Umbilical hernia without obstruction and without gangrene  -     Ambulatory Referral to General Surgery; Future          PHQ-2/9 Depression Screening    Little interest or pleasure in doing things: 0 - not at all  Feeling down, depressed, or hopeless: 0 - not at all  PHQ-2 Score: 0  PHQ-2 Interpretation: Negative depression screen            Subjective:      Patient ID: Davida Smith is a 69 y o  male  Pt here for annual physical  Hypertension  This is a chronic problem  The current episode started more than 1 year ago  The problem is unchanged  The problem is controlled  Pertinent negatives include no chest pain, palpitations or shortness of breath  Past treatments include calcium channel blockers  Compliance problems include diet and exercise  The following portions of the patient's history were reviewed and updated as appropriate: allergies, current medications, past family history, past medical history, past social history, past surgical history and problem list     Review of Systems   Constitutional: Negative  Negative for chills, fatigue and fever  HENT: Negative  Negative for hearing loss  Eyes: Negative  Negative for visual disturbance  Respiratory: Negative for shortness of breath and wheezing  Cardiovascular: Negative for chest pain and palpitations  Gastrointestinal: Negative for abdominal pain, blood in stool, constipation, diarrhea, nausea and vomiting  Endocrine: Negative  Genitourinary: Negative for difficulty urinating and dysuria  Musculoskeletal: Negative for arthralgias and myalgias  Skin: Negative      Allergic/Immunologic: Negative  Neurological: Negative for seizures and syncope  Hematological: Negative for adenopathy  Psychiatric/Behavioral: Negative  Objective:    /70 (BP Location: Left arm, Patient Position: Sitting, Cuff Size: Large)   Pulse 66   Temp (!) 97 °F (36 1 °C) (Tympanic)   Ht 5' 9" (1 753 m)   Wt 79 9 kg (176 lb 4 oz)   SpO2 97%   BMI 26 03 kg/m²      Physical Exam  Vitals and nursing note reviewed  Constitutional:       General: He is not in acute distress  Appearance: Normal appearance  He is well-developed  He is not ill-appearing, toxic-appearing or diaphoretic  HENT:      Head: Normocephalic and atraumatic  Right Ear: Tympanic membrane, ear canal and external ear normal  There is no impacted cerumen  Left Ear: Tympanic membrane, ear canal and external ear normal  There is no impacted cerumen  Nose: Nose normal  No congestion or rhinorrhea  Mouth/Throat:      Mouth: Mucous membranes are moist       Pharynx: Oropharynx is clear  No oropharyngeal exudate or posterior oropharyngeal erythema  Eyes:      General: No scleral icterus  Right eye: No discharge  Left eye: No discharge  Extraocular Movements: Extraocular movements intact  Conjunctiva/sclera: Conjunctivae normal       Pupils: Pupils are equal, round, and reactive to light  Cardiovascular:      Rate and Rhythm: Normal rate and regular rhythm  Pulses: Normal pulses  Heart sounds: Normal heart sounds  No murmur heard  No friction rub  No gallop  Pulmonary:      Effort: Pulmonary effort is normal  No respiratory distress  Breath sounds: Normal breath sounds  No wheezing, rhonchi or rales  Abdominal:      General: Bowel sounds are normal  There is no distension  Palpations: Abdomen is soft  There is no mass  Tenderness: There is no abdominal tenderness  There is no guarding or rebound  Hernia: A hernia is present     Musculoskeletal: General: No swelling or deformity  Normal range of motion  Cervical back: Normal range of motion and neck supple  No rigidity  Right lower leg: No edema  Left lower leg: No edema  Lymphadenopathy:      Cervical: No cervical adenopathy  Skin:     General: Skin is warm and dry  Findings: No rash  Neurological:      General: No focal deficit present  Mental Status: He is alert and oriented to person, place, and time  Sensory: No sensory deficit  Motor: No weakness  Coordination: Coordination normal       Gait: Gait normal       Deep Tendon Reflexes: Reflexes are normal and symmetric  Reflexes normal    Psychiatric:         Mood and Affect: Mood normal          Behavior: Behavior normal          Thought Content:  Thought content normal          Judgment: Judgment normal

## 2022-09-27 ENCOUNTER — TELEPHONE (OUTPATIENT)
Dept: SURGERY | Facility: CLINIC | Age: 67
End: 2022-09-27

## 2022-09-27 NOTE — TELEPHONE ENCOUNTER
Pt called to cancel his appt on 10/12/22 with Dr Shaq Aguilar and  states we take his ins but we are not in network with his ins  Patient states its not an emergency anyway he has an umb hernia that doesn't bother him at all  He doesn't want to go forward with this appt and then later receive a bill  He will give us a call back to schedule an appt when he finds out if we are in network and if the hernia bothers him

## 2022-10-18 DIAGNOSIS — I10 ESSENTIAL HYPERTENSION: ICD-10-CM

## 2022-10-18 RX ORDER — AMLODIPINE BESYLATE 10 MG/1
10 TABLET ORAL DAILY
Qty: 90 TABLET | Refills: 3 | Status: SHIPPED | OUTPATIENT
Start: 2022-10-18

## 2023-03-19 ENCOUNTER — APPOINTMENT (EMERGENCY)
Dept: RADIOLOGY | Facility: HOSPITAL | Age: 68
End: 2023-03-19

## 2023-03-19 ENCOUNTER — APPOINTMENT (EMERGENCY)
Dept: MRI IMAGING | Facility: HOSPITAL | Age: 68
End: 2023-03-19

## 2023-03-19 ENCOUNTER — HOSPITAL ENCOUNTER (EMERGENCY)
Facility: HOSPITAL | Age: 68
End: 2023-03-19
Attending: FAMILY MEDICINE | Admitting: EMERGENCY MEDICINE

## 2023-03-19 ENCOUNTER — APPOINTMENT (EMERGENCY)
Dept: CT IMAGING | Facility: HOSPITAL | Age: 68
End: 2023-03-19

## 2023-03-19 VITALS
SYSTOLIC BLOOD PRESSURE: 148 MMHG | HEART RATE: 79 BPM | TEMPERATURE: 97.3 F | DIASTOLIC BLOOD PRESSURE: 77 MMHG | OXYGEN SATURATION: 94 % | RESPIRATION RATE: 20 BRPM

## 2023-03-19 DIAGNOSIS — G93.6 VASOGENIC BRAIN EDEMA (HCC): ICD-10-CM

## 2023-03-19 DIAGNOSIS — D33.2 BRAIN TUMOR (BENIGN) (HCC): ICD-10-CM

## 2023-03-19 DIAGNOSIS — R91.8 LUNG MASS: ICD-10-CM

## 2023-03-19 DIAGNOSIS — R53.1 WEAKNESS: Primary | ICD-10-CM

## 2023-03-19 LAB
2HR DELTA HS TROPONIN: 0 NG/L
ANION GAP SERPL CALCULATED.3IONS-SCNC: 9 MMOL/L (ref 4–13)
APTT PPP: 27 SECONDS (ref 23–37)
BNP SERPL-MCNC: 29 PG/ML (ref 0–100)
BUN SERPL-MCNC: 15 MG/DL (ref 5–25)
CALCIUM SERPL-MCNC: 9.6 MG/DL (ref 8.4–10.2)
CARDIAC TROPONIN I PNL SERPL HS: 6 NG/L
CARDIAC TROPONIN I PNL SERPL HS: 6 NG/L
CHLORIDE SERPL-SCNC: 105 MMOL/L (ref 96–108)
CO2 SERPL-SCNC: 26 MMOL/L (ref 21–32)
CREAT SERPL-MCNC: 0.91 MG/DL (ref 0.6–1.3)
ERYTHROCYTE [DISTWIDTH] IN BLOOD BY AUTOMATED COUNT: 12.2 % (ref 11.6–15.1)
GFR SERPL CREATININE-BSD FRML MDRD: 85 ML/MIN/1.73SQ M
GLUCOSE SERPL-MCNC: 103 MG/DL (ref 65–140)
GLUCOSE SERPL-MCNC: 88 MG/DL (ref 65–140)
HCT VFR BLD AUTO: 44.5 % (ref 36.5–49.3)
HGB BLD-MCNC: 14.8 G/DL (ref 12–17)
INR PPP: 0.96 (ref 0.84–1.19)
MCH RBC QN AUTO: 30.3 PG (ref 26.8–34.3)
MCHC RBC AUTO-ENTMCNC: 33.3 G/DL (ref 31.4–37.4)
MCV RBC AUTO: 91 FL (ref 82–98)
PLATELET # BLD AUTO: 316 THOUSANDS/UL (ref 149–390)
PMV BLD AUTO: 9.6 FL (ref 8.9–12.7)
POTASSIUM SERPL-SCNC: 3.8 MMOL/L (ref 3.5–5.3)
PROTHROMBIN TIME: 12.8 SECONDS (ref 11.6–14.5)
RBC # BLD AUTO: 4.88 MILLION/UL (ref 3.88–5.62)
SODIUM SERPL-SCNC: 140 MMOL/L (ref 135–147)
WBC # BLD AUTO: 8.32 THOUSAND/UL (ref 4.31–10.16)

## 2023-03-19 RX ORDER — DEXAMETHASONE SODIUM PHOSPHATE 4 MG/ML
10 INJECTION, SOLUTION INTRA-ARTICULAR; INTRALESIONAL; INTRAMUSCULAR; INTRAVENOUS; SOFT TISSUE ONCE
Status: COMPLETED | OUTPATIENT
Start: 2023-03-19 | End: 2023-03-19

## 2023-03-19 RX ADMIN — DEXAMETHASONE SODIUM PHOSPHATE 10 MG: 4 INJECTION, SOLUTION INTRAMUSCULAR; INTRAVENOUS at 17:15

## 2023-03-19 RX ADMIN — SODIUM CHLORIDE 1000 ML: 0.9 INJECTION, SOLUTION INTRAVENOUS at 17:27

## 2023-03-19 RX ADMIN — GADOBUTROL 7 ML: 604.72 INJECTION INTRAVENOUS at 16:29

## 2023-03-19 RX ADMIN — IOHEXOL 85 ML: 350 INJECTION, SOLUTION INTRAVENOUS at 13:01

## 2023-03-19 NOTE — ED PROVIDER NOTES
History  Chief Complaint   Patient presents with   • Extremity Weakness     Patient reports hx of stroke and left sided weakness that started last week  Patient reports it has gotten progressively worse  HPI  This is a 68-year-old male with history of stroke with some deficit on the left side presented to ED with the complaint of progressively getting weakness on the right upper and lower extremity  Daughter who is by bedside stated that she is visiting and noticed that that he is progressing getting worse and was stumbling at home having difficulty ambulating  Patient also some complaint of shortness of breath denies any chest pain  Patient is not hypoxic in the ED  Denies any weight loss denies any chest pain   Denies any abdominal pain nausea vomiting or diarrhea  Denies any headache blurry vision double vision  Prior to Admission Medications   Prescriptions Last Dose Informant Patient Reported? Taking? amLODIPine (NORVASC) 10 mg tablet   No No   Sig: Take 1 tablet (10 mg total) by mouth daily   aspirin (ECOTRIN LOW STRENGTH) 81 mg EC tablet   Yes No   Sig: Take 81 mg by mouth daily   losartan (COZAAR) 50 mg tablet   No No   Sig: Take 1 tablet (50 mg total) by mouth daily      Facility-Administered Medications: None       Past Medical History:   Diagnosis Date   • Hypertension    • Prostate cancer ([facility] [address] )    • Rectal bleeding    • Stroke [facility])              Past Surgical History:   Procedure Laterality Date   • COLONOSCOPY     • PROSTATECTOMY     • TONSILLECTOMY         Family History   Problem Relation Age of Onset   • Dementia Mother            • Breast cancer Sister            • Prostate cancer Brother         Received Radiation     I have reviewed and agree with the history as documented      E-Cigarette/Vaping   • E-Cigarette Use Never User      E-Cigarette/Vaping Substances   • Nicotine No    • THC No    • CBD No    • Flavoring No    • Other No    • Unknown No Social History     Tobacco Use   • Smoking status: Former     Packs/day: 1 00     Years: 15 00     Pack years: 15 00     Types: Cigarettes   • Smokeless tobacco: Never   • Tobacco comments:     i quit when i was 28  not sure of exact dates   Vaping Use   • Vaping Use: Never used   Substance Use Topics   • Alcohol use: Yes     Alcohol/week: 6 0 standard drinks     Types: 6 Cans of beer per week     Comment: does not drink every day   • Drug use: Not Currently     Types: Marijuana       Review of Systems   Constitutional: Negative  HENT: Negative  Eyes: Negative  Respiratory: Positive for shortness of breath  Cardiovascular: Negative  Gastrointestinal: Negative  Endocrine: Negative  Genitourinary: Negative  Musculoskeletal: Negative  Skin: Negative  Neurological: Positive for weakness and numbness  Negative for dizziness, tremors, seizures, syncope, facial asymmetry, speech difficulty, light-headedness and headaches  Psychiatric/Behavioral: Negative  Physical Exam  Physical Exam  Vitals and nursing note reviewed  Constitutional:       General: He is not in acute distress  Appearance: He is well-developed  He is not diaphoretic  HENT:      Head: Normocephalic and atraumatic  Right Ear: External ear normal       Left Ear: External ear normal       Nose: Nose normal       Mouth/Throat:      Mouth: Mucous membranes are moist       Pharynx: Oropharynx is clear  No posterior oropharyngeal erythema  Eyes:      Conjunctiva/sclera: Conjunctivae normal       Pupils: Pupils are equal, round, and reactive to light  Cardiovascular:      Rate and Rhythm: Normal rate and regular rhythm  Pulses: Normal pulses  Heart sounds: Normal heart sounds  Pulmonary:      Effort: Pulmonary effort is normal  No respiratory distress  Breath sounds: Normal breath sounds  No wheezing  Abdominal:      General: Bowel sounds are normal  There is no distension  Palpations: Abdomen is soft  Tenderness: There is no abdominal tenderness  Musculoskeletal:         General: Normal range of motion  Cervical back: Normal range of motion and neck supple  Lymphadenopathy:      Cervical: No cervical adenopathy  Skin:     General: Skin is warm and dry  Capillary Refill: Capillary refill takes less than 2 seconds  Neurological:      Mental Status: He is alert and oriented to person, place, and time  Comments: Motor weakness on the left extremity  Sensation intact  No ataxia noted     Psychiatric:         Mood and Affect: Mood normal          Behavior: Behavior normal          Vital Signs  ED Triage Vitals   Temperature Pulse Respirations Blood Pressure SpO2   03/19/23 1142 03/19/23 1142 03/19/23 1142 03/19/23 1142 03/19/23 1142   (!) 97 3 °F (36 3 °C) 84 18 154/89 97 %      Temp Source Heart Rate Source Patient Position - Orthostatic VS BP Location FiO2 (%)   03/19/23 1142 03/19/23 1343 03/19/23 1343 -- --   Tympanic Monitor Sitting        Pain Score       03/19/23 1142       No Pain           Vitals:    03/19/23 1142 03/19/23 1343 03/19/23 1500   BP: 154/89 136/77 138/75   Pulse: 84 74 80   Patient Position - Orthostatic VS:  Sitting Sitting         Visual Acuity  Visual Acuity    Flowsheet Row Most Recent Value   L Pupil Size (mm) 3   R Pupil Size (mm) 3          ED Medications  Medications   sodium chloride 0 9 % bolus 1,000 mL (has no administration in time range)   dexamethasone (DECADRON) injection 10 mg (has no administration in time range)   iohexol (OMNIPAQUE) 350 MG/ML injection (SINGLE-DOSE) 85 mL (85 mL Intravenous Given 3/19/23 1301)   Gadobutrol injection (SINGLE-DOSE) SOLN 7 mL (7 mL Intravenous Given 3/19/23 1629)       Diagnostic Studies  Results Reviewed     Procedure Component Value Units Date/Time    HS Troponin I 2hr [[id number]]  (Normal) Collected: 03/19/23 1345    Lab Status: Final result Specimen: Blood from Line, Venous Updated: 03/19/23 1423     hs TnI 2hr 6 ng/L      Delta 2hr hsTnI 0 ng/L     B-Type Natriuretic Peptide(BNP) [[id number]]  (Normal) Collected: 03/19/23 1148    Lab Status: Final result Specimen: Blood from Arm, Right Updated: 03/19/23 1249     BNP 29 pg/mL     HS Troponin 0hr (reflex protocol) [[id number]]  (Normal) Collected: 03/19/23 1148    Lab Status: Final result Specimen: Blood from Arm, Right Updated: 03/19/23 1234     hs TnI 0hr 6 ng/L     HS Troponin I 4hr [[id number]]     Lab Status: No result Specimen: Blood     Basic metabolic panel [[id number]] Collected: 03/19/23 1148    Lab Status: Final result Specimen: Blood from Arm, Right Updated: 03/19/23 1225     Sodium 140 mmol/L      Potassium 3 8 mmol/L      Chloride 105 mmol/L      CO2 26 mmol/L      ANION GAP 9 mmol/L      BUN 15 mg/dL      Creatinine 0 91 mg/dL      Glucose 103 mg/dL      Calcium 9 6 mg/dL      eGFR 85 ml/min/1 73sq m     Narrative:      [place] guidelines for Chronic Kidney Disease (CKD):   •  Stage 1 with normal or high GFR (GFR > 90 mL/min/1 73 square meters)  •  Stage 2 Mild CKD (GFR = 60-89 mL/min/1 73 square meters)  •  Stage 3A Moderate CKD (GFR = 45-59 mL/min/1 73 square meters)  •  Stage 3B Moderate CKD (GFR = 30-44 mL/min/1 73 square meters)  •  Stage 4 Severe CKD (GFR = 15-29 mL/min/1 73 square meters)  •  Stage 5 End Stage CKD (GFR <15 mL/min/1 73 square meters)  Note: GFR calculation is accurate only with a steady state creatinine    Protime-INR [[id number]]  (Normal) Collected: 03/19/23 1148    Lab Status: Final result Specimen: Blood from Arm, Right Updated: 03/19/23 1222     Protime 12 8 seconds      INR 0 96    APTT [[id number]]  (Normal) Collected: 03/19/23 1148    Lab Status: Final result Specimen: Blood from Arm, Right Updated: 03/19/23 1222     PTT 27 seconds     CBC and Platelet [[id number]]  (Normal) Collected: 03/19/23 1148    Lab Status: Final result Specimen: Blood from Arm, Right Updated: 03/19/23 1204 WBC 8 32 Thousand/uL      RBC 4 88 Million/uL      Hemoglobin 14 8 g/dL      Hematocrit 44 5 %      MCV 91 fL      MCH 30 3 pg      MCHC 33 3 g/dL      RDW 12 2 %      Platelets 469 Thousands/uL      MPV 9 6 fL     Fingerstick Glucose (POCT) [[id number]]  (Normal) Collected: 03/19/23 1204    Lab Status: Final result Updated: 03/19/23 1205     POC Glucose 88 mg/dl       df           CTA head and neck with and without contrast   Final Result by Betsy Tinsley MD (03/19 1419)      Right frontal lobe cystic lesion with surrounding vasogenic edema most consistent with metastatic disease given findings described below  Local mass effect on the right lateral ventricle without significant midline shift  Contrast-enhanced MRI of the    brain recommended for further evaluation  Right upper lobe mass with partially imaged right hilar adenopathy  Dedicated CT of the chest, abdomen and pelvis is recommended for further evaluation  No evidence for high-grade stenosis, major branch vessel occlusion or vascular aneurysm of the cervical or intracranial vessels  I personally discussed this study with ESAU FANG on 3/19/2023 at 2:08 PM                Workstation performed: [id number]         XR chest 1 view portable    (Results Pending)   CT chest abdomen pelvis w contrast    (Results Pending)   MRI brain w wo contrast    (Results Pending)              Procedures  Procedures         ED Course  ED Course as of 03/19/23 1722   Sun Mar 19, 2023   1516 [facility]/Brain and Spine Call (Dilip Cardenas([facility]))  [employer] like a plan  You can review with neurocritical care after you get the mri    1526 CT findings discussed with patient and family who is by bedside and they are unaware of the diagnosis  1526 MRI ordered    Unable to do CT chest abdomen pelvis at this time since patient already received contrast    [address]/[place] Breen Final text [address] waiting for call back    [id number] Called radiologist recommending to give him at least 24 hours before giving him another dose of contrast    Decadron is given  Pt care transfer to Dr Malika Garcia , pending MRI             Stroke Assessment     Row Name 03/19/23 1159             NIH Stroke Scale    Interval Baseline      Level of Consciousness (1a ) 0      LOC Questions (1b ) 0      LOC Commands (1c ) 0      Best Gaze (2 ) 0      Visual (3 ) 0      Facial Palsy (4 ) 0      Motor Arm, Left (5a ) 1      Motor Arm, Right (5b ) 0      Motor Leg, Left (6a ) 1      Motor Leg, Right (6b ) 0      Limb Ataxia (7 ) 0      Sensory (8 ) 0      Best Language (9 ) 0      Dysarthria (10 ) 0      Extinction and Inattention (11 ) (Formerly Neglect) 0      Total 2                            SBIRT 22yo+    Flowsheet Row Most Recent Value   SBIRT (23 yo +)    In order to provide better care to our patients, we are screening all of our patients for alcohol and drug use  Would it be okay to ask you these screening questions? Yes Filed at: 03/19/2023 1217   Initial Alcohol Screen: US AUDIT-C     1  How often do you have a drink containing alcohol? 0 Filed at: 03/19/2023 1217   2  How many drinks containing alcohol do you have on a typical day you are drinking? 0 Filed at: 03/19/2023 1217   3a  Male UNDER 65: How often do you have five or more drinks on one occasion? 0 Filed at: 03/19/2023 1217   3b  FEMALE Any Age, or MALE 65+: How often do you have 4 or more drinks on one occassion? 0 Filed at: 03/19/2023 1217   Audit-C Score 0 Filed at: 03/19/2023 1217   MARK: How many times in the past year have you    Used an illegal drug or used a prescription medication for non-medical reasons?  Never Filed at: 03/19/2023 1217                    MDM    Disposition  Final diagnoses:   Weakness   Brain tumor (benign) ([facility] [address] )   Lung mass   Vasogenic brain edema ([facility] [address] )     Time reflects when diagnosis was documented in both MDM as applicable and the Disposition within this note     Time User Action Codes Description Comment    3/19/2023  5:13 PM Danii Grit Add [R53 1] Weakness     3/19/2023  5:13 PM Danii Grit Add [D33 2] Brain tumor (benign) ([address] )     3/19/2023  5:14 PM Danii Grit Add [R91 8] Lung mass     3/19/2023  5:14 PM Danii Grit Add [G93 6] Vasogenic brain edema [facility])       ED Disposition     None      Follow-up Information    None         Patient's Medications   Discharge Prescriptions    No medications on file       No discharge procedures on file      PDMP Review     None          ED Provider  Electronically Signed by           Jomar Ford MD  03/19/23 2715

## 2023-03-20 ENCOUNTER — APPOINTMENT (OUTPATIENT)
Dept: RADIOLOGY | Facility: HOSPITAL | Age: 68
End: 2023-03-20

## 2023-03-20 ENCOUNTER — HOSPITAL ENCOUNTER (INPATIENT)
Facility: HOSPITAL | Age: 68
LOS: 10 days | End: 2023-03-30
Attending: FAMILY MEDICINE | Admitting: FAMILY MEDICINE

## 2023-03-20 DIAGNOSIS — R91.8 LUNG MASS: ICD-10-CM

## 2023-03-20 DIAGNOSIS — G93.5 BRAIN COMPRESSION (HCC): ICD-10-CM

## 2023-03-20 DIAGNOSIS — Z90.79 S/P PROSTATECTOMY: ICD-10-CM

## 2023-03-20 DIAGNOSIS — G93.6 CEREBRAL EDEMA (HCC): ICD-10-CM

## 2023-03-20 DIAGNOSIS — I63.9 CVA (CEREBRAL VASCULAR ACCIDENT) (HCC): ICD-10-CM

## 2023-03-20 DIAGNOSIS — Z79.52 LONG TERM CURRENT USE OF SYSTEMIC STEROIDS: ICD-10-CM

## 2023-03-20 DIAGNOSIS — G93.89 BRAIN MASS: Primary | ICD-10-CM

## 2023-03-20 DIAGNOSIS — E78.00 HYPERCHOLESTEROLEMIA: ICD-10-CM

## 2023-03-20 LAB
ATRIAL RATE: 73 BPM
ATRIAL RATE: 74 BPM
CHOLEST SERPL-MCNC: 147 MG/DL
EST. AVERAGE GLUCOSE BLD GHB EST-MCNC: 126 MG/DL
GLUCOSE SERPL-MCNC: 146 MG/DL (ref 65–140)
GLUCOSE SERPL-MCNC: 167 MG/DL (ref 65–140)
GLUCOSE SERPL-MCNC: 98 MG/DL (ref 65–140)
HBA1C MFR BLD: 6 %
HDLC SERPL-MCNC: 43 MG/DL
LDLC SERPL CALC-MCNC: 91 MG/DL (ref 0–100)
P AXIS: 34 DEGREES
P AXIS: 52 DEGREES
PLATELET # BLD AUTO: 287 THOUSANDS/UL (ref 149–390)
PMV BLD AUTO: 9.4 FL (ref 8.9–12.7)
PR INTERVAL: 186 MS
PR INTERVAL: 188 MS
QRS AXIS: -30 DEGREES
QRS AXIS: -38 DEGREES
QRSD INTERVAL: 82 MS
QRSD INTERVAL: 86 MS
QT INTERVAL: 366 MS
QT INTERVAL: 392 MS
QTC INTERVAL: 403 MS
QTC INTERVAL: 435 MS
T WAVE AXIS: 51 DEGREES
T WAVE AXIS: 53 DEGREES
TRIGL SERPL-MCNC: 64 MG/DL
TSH SERPL DL<=0.05 MIU/L-ACNC: 2.17 UIU/ML (ref 0.45–4.5)
VENTRICULAR RATE: 73 BPM
VENTRICULAR RATE: 74 BPM

## 2023-03-20 RX ORDER — SODIUM CHLORIDE 9 MG/ML
75 INJECTION, SOLUTION INTRAVENOUS CONTINUOUS
Status: DISCONTINUED | OUTPATIENT
Start: 2023-03-20 | End: 2023-03-20

## 2023-03-20 RX ORDER — DEXAMETHASONE SODIUM PHOSPHATE 4 MG/ML
4 INJECTION, SOLUTION INTRA-ARTICULAR; INTRALESIONAL; INTRAMUSCULAR; INTRAVENOUS; SOFT TISSUE EVERY 6 HOURS SCHEDULED
Status: DISCONTINUED | OUTPATIENT
Start: 2023-03-20 | End: 2023-03-23

## 2023-03-20 RX ORDER — DEXAMETHASONE SODIUM PHOSPHATE 10 MG/ML
10 INJECTION, SOLUTION INTRAMUSCULAR; INTRAVENOUS EVERY 24 HOURS
Status: DISCONTINUED | OUTPATIENT
Start: 2023-03-20 | End: 2023-03-20

## 2023-03-20 RX ORDER — SODIUM CHLORIDE 9 MG/ML
75 INJECTION, SOLUTION INTRAVENOUS CONTINUOUS
Status: DISPENSED | OUTPATIENT
Start: 2023-03-20 | End: 2023-03-20

## 2023-03-20 RX ORDER — ASPIRIN 81 MG/1
81 TABLET ORAL DAILY
Status: DISCONTINUED | OUTPATIENT
Start: 2023-03-20 | End: 2023-03-20

## 2023-03-20 RX ORDER — PANTOPRAZOLE SODIUM 40 MG/1
40 TABLET, DELAYED RELEASE ORAL
Status: DISCONTINUED | OUTPATIENT
Start: 2023-03-20 | End: 2023-03-30 | Stop reason: HOSPADM

## 2023-03-20 RX ORDER — AMLODIPINE BESYLATE 10 MG/1
10 TABLET ORAL DAILY
Status: DISCONTINUED | OUTPATIENT
Start: 2023-03-20 | End: 2023-03-30 | Stop reason: HOSPADM

## 2023-03-20 RX ORDER — ENOXAPARIN SODIUM 100 MG/ML
40 INJECTION SUBCUTANEOUS DAILY
Status: DISCONTINUED | OUTPATIENT
Start: 2023-03-20 | End: 2023-03-22

## 2023-03-20 RX ORDER — LOSARTAN POTASSIUM 50 MG/1
50 TABLET ORAL DAILY
Status: DISCONTINUED | OUTPATIENT
Start: 2023-03-20 | End: 2023-03-30 | Stop reason: HOSPADM

## 2023-03-20 RX ADMIN — DEXAMETHASONE SODIUM PHOSPHATE 4 MG: 4 INJECTION INTRA-ARTICULAR; INTRALESIONAL; INTRAMUSCULAR; INTRAVENOUS; SOFT TISSUE at 18:38

## 2023-03-20 RX ADMIN — ENOXAPARIN SODIUM 40 MG: 40 INJECTION SUBCUTANEOUS at 09:12

## 2023-03-20 RX ADMIN — IOHEXOL 100 ML: 350 INJECTION, SOLUTION INTRAVENOUS at 11:07

## 2023-03-20 RX ADMIN — LOSARTAN POTASSIUM 50 MG: 50 TABLET, FILM COATED ORAL at 09:12

## 2023-03-20 RX ADMIN — LEVETIRACETAM 500 MG: 100 INJECTION, SOLUTION INTRAVENOUS at 01:43

## 2023-03-20 RX ADMIN — LEVETIRACETAM 500 MG: 100 INJECTION, SOLUTION INTRAVENOUS at 09:12

## 2023-03-20 RX ADMIN — AMLODIPINE BESYLATE 10 MG: 10 TABLET ORAL at 09:12

## 2023-03-20 RX ADMIN — ASPIRIN 81 MG: 81 TABLET, COATED ORAL at 09:12

## 2023-03-20 RX ADMIN — SODIUM CHLORIDE 75 ML/HR: 0.9 INJECTION, SOLUTION INTRAVENOUS at 13:04

## 2023-03-20 RX ADMIN — DEXAMETHASONE SODIUM PHOSPHATE 4 MG: 4 INJECTION INTRA-ARTICULAR; INTRALESIONAL; INTRAMUSCULAR; INTRAVENOUS; SOFT TISSUE at 13:04

## 2023-03-20 RX ADMIN — LEVETIRACETAM 500 MG: 100 INJECTION, SOLUTION INTRAVENOUS at 21:18

## 2023-03-20 RX ADMIN — PANTOPRAZOLE SODIUM 40 MG: 40 TABLET, DELAYED RELEASE ORAL at 09:12

## 2023-03-20 NOTE — QUICK NOTE
Patient back from CT scan  Will get 4 hours a 75 cc of fluid since patient got IV contrast twice in last 24 hours  Contacted by neurosurgery PA that ASA will be held in case patient goes to OR later in the week   Steroid change to 4 mg q 6 hours

## 2023-03-20 NOTE — EMTALA/ACUTE CARE TRANSFER
[address] [facility] [address]  Dept: [phone]      EMTALA TRANSFER CONSENT    NAME Juvencio DICK 1955                              MRN [id number]    I have been informed of my rights regarding examination, treatment, and transfer   by Dr Selvin Barragan MD    Benefits:      Risks:        Consent for Transfer:  I acknowledge that my medical condition has been evaluated and explained to me by the emergency department physician or other qualified medical person and/or my attending physician, who has recommended that I be transferred to the service of    at    The above potential benefits of such transfer, the potential risks associated with such transfer, and the probable risks of not being transferred have been explained to me, and I fully understand them  The doctor has explained that, in my case, the benefits of transfer outweigh the risks  I agree to be transferred  I authorize the performance of emergency medical procedures and treatments upon me in both transit and upon arrival at the receiving facility  Additionally, I authorize the release of any and all medical records to the receiving facility and request they be transported with me, if possible  I understand that the safest mode of transportation during a medical emergency is an ambulance and that the Hospital advocates the use of this mode of transport  Risks of traveling to the receiving facility by car, including absence of medical control, life sustaining equipment, such as oxygen, and medical personnel has been explained to me and I fully understand them  (DOUG CORRECT BOX BELOW)  [  ]  I consent to the stated transfer and to be transported by ambulance/helicopter  [  ]  I consent to the stated transfer, but refuse transportation by ambulance and accept full responsibility for my transportation by car    I understand the risks of non-ambulance transfers and I exonerate the Hospital and its staff from any deterioration in my condition that results from this refusal     X___________________________________________    DATE  23  TIME________  Signature of patient or legally responsible individual signing on patient behalf           RELATIONSHIP TO PATIENT_________________________          Provider Certification    NAME Blake Barrios                                         1955                              MRN [id number]    A medical screening exam was performed on the above named patient  Based on the examination:    Condition Necessitating Transfer The primary encounter diagnosis was Weakness  Diagnoses of Brain tumor (benign) ([facility] [address] ), Lung mass, and Vasogenic brain edema ([facility] [address] ) were also pertinent to this visit  Patient Condition:      Reason for Transfer:      Transfer Requirements: Facility     · Space available and qualified personnel available for treatment as acknowledged by    · Agreed to accept transfer and to provide appropriate medical treatment as acknowledged by          · Appropriate medical records of the examination and treatment of the patient are provided at the time of transfer   [address] [facility], [address] _______  · Transfer will be performed by qualified personnel from    and appropriate transfer equipment as required, including the use of necessary and appropriate life support measures      Provider Certification: I have examined the patient and explained the following risks and benefits of being transferred/refusing transfer to the patient/family:         Based on these reasonable risks and benefits to the patient and/or the unborn child(tera), and based upon the information available at the time of the patient’s examination, I certify that the medical benefits reasonably to be expected from the provision of appropriate medical treatments at another medical facility outweigh the increasing risks, if any, to the individual’s medical condition, and in the case of labor to the unborn child, from effecting the transfer      X____________________________________________ DATE 03/19/23        TIME_______      ORIGINAL - SEND TO MEDICAL RECORDS   COPY - SEND WITH PATIENT DURING TRANSFER

## 2023-03-20 NOTE — ASSESSMENT & PLAN NOTE
"Presenting with 1 week progressive L sided weakness upper and lower extremities   CTA head and neck 3/19/23 \"Right frontal lobe cystic lesion with surrounding vasogenic edema most consistent with metastatic disease given findings described below  Local mass effect on the right lateral ventricle without significant midline shift  Right upper lobe mass with partially imaged right hilar adenopathy  \"  MRI brain w wo contrast 3/19/23 \"Rim-enhancing 2 4 cm centrally necrotic metastatic lesion in the right frontal lobe with surrounding severe vasogenic edema  Mass effect in the right hemisphere with approximately 2 4 mm right to left midline shift  Small focus of anterior cortical frontal acute to subacute ischemia  No evidence of acute intracranial hemorrhage  \"  Given findings, transfer from [place] after discussion with neurocritical and NSGY for biopsy   - continue decadron, start keppra  - neurology and NSGY consults, appreciate recommendations   - hold off on oncology consult until biopsy results or other definitive diagnosis    - will proceed with CT chest abdomen pelvis for further evaluation of RUL mass partially imaged on CTA head neck and also to look for other areas of source or metastases   - PT/OT eval when appropriate   Will likely need SNF placement on dc   "

## 2023-03-20 NOTE — ASSESSMENT & PLAN NOTE
R frontal brain mass w/ surrounding vasogenic edema   - presented to the [facility] ED on 3/19 with progressively worsening LUE and LLE weakness  - concern for possible metastatic disease given noted RUL mass on CT c/a/p   - pt reports remote hx of prostate CA s/p prostatectomy several yrs ago and no further intervention or f/u since    Imaging:   · 3/19/2023 MRI brain w/wo: Rim-enhancing 2 4 cm centrally necrotic metastatic lesion in the right frontal lobe with surrounding severe vasogenic edema  Mass effect in the right hemisphere with approximately 2 4 mm right to left midline shift  Small focus of anterior cortical frontal acute to subacute ischemia  No evidence of acute intracranial hemorrhage    Plan:   · Continue to closely monitor neuro exam at this time   · frequent neuro checks per primary team   · Repeat STAT CTH with any acute decline in GCS > 2pts or more   · Maintain normotensive BP goals, SBP < 160   · No acute/emergent neuorsurgical intervention indicated at this time   · Imaging and case reviewed by nsgy team  · Ongoing discussion amongst nsgy team in regard to surgical plans    · Tentative plan for OR on Thursday with Dr Carlos Aguiar   · Please obtain repeat MRI brain with directional DTI sequence pre-operatively   · Please obtain pre-op medical clearance  · Continue ongoing evaluation of new brain mass/metastatic workup  · Continue keppra 500mg q 12hrs   · Continue decadron 4mg q 6hrs   · Continue to hold home ASA given possible need for neurosurgical intervention   · DVT ppx: SCDs, SQ lovenox   · Pain control per primary team   · PT/OT   · Continue medical management per primary team   · Consider heme/onc and rad/onc consults given concern for lung primary disease with RUL mass noted   · Social work following for assistance with dispo once medically stable/cleared     Neurosurgery will continue to closely follow  Please reach out with any further questions or concerns

## 2023-03-20 NOTE — ASSESSMENT & PLAN NOTE
- Incidentally noted small focus of anterior frontal acute to subacute ischemia   Infarct possibly related to underlying mass and edema, but cannot entirely exclude embolic infarct in setting of hypercoagulability with possible underlying malignancy,  - Recommend check fasting lipid panel, hemoglobin A1c, TSH    - Check echocardiogram    - Monitor on telemetry    - Recommend continue on ASA 81 mg QD and start atorvastatin 40 mg QPM    - Consider treat with therapeutic Lovenox if lung malignancy is identified   - Goal normothermia, normotension and euglycemia    - PT/OT   - Stroke education    - Patient will ultimately need outpatient follow-up with neurovascular team

## 2023-03-20 NOTE — H&P
"[address] [facility]  H&P- [address] 1955, 69 y o  male MRN: [id number]  Unit/Bed#: [facility] 625-01 Encounter: [id number]  Primary Care Provider: Bernardino Valdovinos DO   Date and time admitted to hospital: 3/20/2023 12:13 AM    * Brain mass  Assessment & Plan  Presenting with 1 week progressive L sided weakness upper and lower extremities   CTA head and neck 3/19/23 \"Right frontal lobe cystic lesion with surrounding vasogenic edema most consistent with metastatic disease given findings described below  Local mass effect on the right lateral ventricle without significant midline shift  Right upper lobe mass with partially imaged right hilar adenopathy  \"  MRI brain w wo contrast 3/19/23 \"Rim-enhancing 2 4 cm centrally necrotic metastatic lesion in the right frontal lobe with surrounding severe vasogenic edema  Mass effect in the right hemisphere with approximately 2 4 mm right to left midline shift  Small focus of anterior cortical frontal acute to subacute ischemia  No evidence of acute intracranial hemorrhage  \"  Given findings, transfer from [place] after discussion with neurocritical and NSGY for biopsy   - continue decadron, start keppra  - neurology and NSGY consults, appreciate recommendations   - hold off on oncology consult until biopsy results or other definitive diagnosis    - will proceed with CT chest abdomen pelvis for further evaluation of RUL mass partially imaged on CTA head neck and also to look for other areas of source or metastases   - PT/OT eval when appropriate  Will likely need SNF placement on dc     CVA (cerebral vascular accident) ([facility] [address] )  Assessment & Plan  Hx CVA 11-12 years ago with mild residual L sided deficits, baseline fully functional motor  - on aspirin 81 mg     Essential hypertension  Assessment & Plan  Systolic (01XQL), ZVA:505 , Min:177 , Max:177     Initial 177/98 on transfer   Continue PTA medications amlodipine 10 mg and losartan 50 mg " daily        DVT Prophylaxis- lovenox  Diet-        Diet Orders   (From admission, onward)             Start     Ordered    03/20/23 0052  Diet Regular; Regular House  Diet effective now        References:    Nutrtion Support Algorithm Enteral vs  Parenteral   Question Answer Comment   Diet Type Regular    Regular Regular House    RD to adjust diet per protocol? Yes        03/20/23 0052              Code Status- full code  Disposition- observation     Discussed with attending physician, Dr Cyndi Hernandez, and [facility] team   SUBJECTIVE  HPI:  69 y o  male with past medical history significant for CVA with mild residual L sided deficit and HTN presenting transfer from [place] with new brain mass concerning for metastasis  Pt reports L sided weakness upper and lower progressing over the past week  He thinks it is worsening  Reports not being able to walk well/bend knees and decreased strength in LUE  Hx of stroke 11-12 years ago with mild residual L sided deficits, reports baseline is no weakness but some unusual sensation in fingers and toes  Otherwise he is normally able to function fully, ambulate without assistance and full use of L side  Pt reports no decrease in appetite, fatigue, weight loss  In fact he is gaining weight  Previously smoked 1PPD for 20 years, quit 30 years ago  Review of Systems   Constitutional: Negative for appetite change, chills, fatigue, fever and unexpected weight change  HENT: Negative for ear pain, sore throat, trouble swallowing and voice change  Eyes: Negative for pain and visual disturbance  Respiratory: Negative for cough and shortness of breath  Cardiovascular: Negative for chest pain and palpitations  Gastrointestinal: Negative for abdominal pain and vomiting  Genitourinary: Negative for dysuria and hematuria  Musculoskeletal: Positive for gait problem  Negative for arthralgias and back pain  Skin: Negative for color change and rash     Neurological: Positive for weakness  Negative for dizziness, seizures, syncope, numbness and headaches  All other systems reviewed and are negative  Historical Information   Past Medical History:   Diagnosis Date   • Hypertension    • Prostate cancer ([facility] [address] )    • Rectal bleeding    • Stroke [facility])            Past Surgical History:   Procedure Laterality Date   • COLONOSCOPY     • PROSTATECTOMY     • TONSILLECTOMY       Social History   Social History     Substance and Sexual Activity   Alcohol Use Yes   • Alcohol/week: 6 0 standard drinks   • Types: 6 Cans of beer per week    Comment: does not drink every day     Social History     Substance and Sexual Activity   Drug Use Not Currently   • Types: Marijuana     Social History     Tobacco Use   Smoking Status Former   • Packs/day:  00   • Years: 15 00   • Pack years: 15 00   • Types: Cigarettes   Smokeless Tobacco Never   Tobacco Comments    i quit when i was 25  not sure of exact dates     Family History:   Family History   Problem Relation Age of Onset   • Dementia Mother            • Breast cancer Sister            • Prostate cancer Brother         Received Radiation       Medications:  Meds/Allergies   PTA meds:   Prior to Admission Medications   Prescriptions Last Dose Informant Patient Reported? Taking?    amLODIPine (NORVASC) 10 mg tablet 3/19/2023  No Yes   Sig: Take 1 tablet (10 mg total) by mouth daily   aspirin (ECOTRIN LOW STRENGTH) 81 mg EC tablet 3/19/2023  Yes Yes   Sig: Take 81 mg by mouth daily   losartan (COZAAR) 50 mg tablet 3/19/2023  No Yes   Sig: Take 1 tablet (50 mg total) by mouth daily      Facility-Administered Medications: None     No Known Allergies    OBJECTIVE  Vitals:   Vitals:    23 0022   BP: (!) 177/98   Pulse: 87   Resp: 18   Temp: 98 3 °F (36 8 °C)   SpO2: 96%       No intake or output data in the 24 hours ending 23 0026    Invasive Devices     Peripheral Intravenous Line  Duration           Peripheral IV 23 Proximal;Right;Ventral (anterior) Forearm <1 day                Physical Exam  Vitals reviewed  Constitutional:       General: He is not in acute distress  Appearance: He is well-developed  HENT:      Head: Normocephalic and atraumatic  Eyes:      Extraocular Movements: Extraocular movements intact  Conjunctiva/sclera: Conjunctivae normal       Pupils: Pupils are equal, round, and reactive to light  Cardiovascular:      Rate and Rhythm: Normal rate and regular rhythm  Heart sounds: Normal heart sounds  No murmur heard  Pulmonary:      Effort: Pulmonary effort is normal  No respiratory distress  Breath sounds: Normal breath sounds  No wheezing or rales  Abdominal:      General: Bowel sounds are normal  There is no distension  Palpations: Abdomen is soft  Tenderness: There is no abdominal tenderness  Musculoskeletal:         General: No tenderness or deformity  Normal range of motion  Cervical back: Normal range of motion and neck supple  Skin:     General: Skin is warm and dry  Findings: No rash  Neurological:      General: No focal deficit present  Mental Status: He is alert  Mental status is at baseline  Cranial Nerves: No cranial nerve deficit  Sensory: No sensory deficit  Motor: Weakness present  Comments: RUE RLE 5/5 strength  LUE 4/5 LLE 4/5 strength    Psychiatric:         Mood and Affect: Mood normal          Behavior: Behavior normal           Lab:  I have personally reviewed all pertinent results  No visits with results within 1 Day(s) from this visit     Latest known visit with results is:   Admission on 03/19/2023, Discharged on 03/19/2023   Component Date Value Ref Range Status   • Sodium 03/19/2023 140  135 - 147 mmol/L Final   • Potassium 03/19/2023 3 8  3 5 - 5 3 mmol/L Final   • Chloride 03/19/2023 105  96 - 108 mmol/L Final   • CO2 03/19/2023 26  21 - 32 mmol/L Final   • ANION GAP 03/19/2023 9  4 - 13 mmol/L Final   • "BUN 03/19/2023 15  5 - 25 mg/dL Final   • Creatinine 03/19/2023 0 91  0 60 - 1 30 mg/dL Final    Standardized to IDMS reference method   • Glucose 03/19/2023 103  65 - 140 mg/dL Final    If the patient is fasting, the ADA then defines impaired fasting glucose as > 100 mg/dL and diabetes as > or equal to 123 mg/dL  Specimen collection should occur prior to Sulfasalazine administration due to the potential for falsely depressed results  Specimen collection should occur prior to Sulfapyridine administration due to the potential for falsely elevated results  • Calcium 03/19/2023 9 6  8 4 - 10 2 mg/dL Final   • eGFR 03/19/2023 85  ml/min/1 73sq m Final   • WBC 03/19/2023 8 32  4 31 - 10 16 Thousand/uL Final   • RBC 03/19/2023 4 88  3 88 - 5 62 Million/uL Final   • Hemoglobin 03/19/2023 14 8  12 0 - 17 0 g/dL Final   • Hematocrit 03/19/2023 44 5  36 5 - 49 3 % Final   • MCV 03/19/2023 91  82 - 98 fL Final   • MCH 03/19/2023 30 3  26 8 - 34 3 pg Final   • MCHC 03/19/2023 33 3  31 4 - 37 4 g/dL Final   • RDW 03/19/2023 12 2  11 6 - 15 1 % Final   • Platelets 54/58/2109 316  149 - 390 Thousands/uL Final   • MPV 03/19/2023 9 6  8 9 - 12 7 fL Final   • Protime 03/19/2023 12 8  11 6 - 14 5 seconds Final   • INR 03/19/2023 0 96  0 84 - 1 19 Final   • PTT 03/19/2023 27  23 - 37 seconds Final    Therapeutic Heparin Range =  60-90 seconds   • hs TnI 0hr 03/19/2023 6  \"Refer to ACS Flowchart\"- see link ng/L Final    Comment:                                              Initial (time 0) result  If >=50 ng/L, Myocardial injury suggested ;  Type of myocardial injury and treatment strategy  to be determined  If 5-49 ng/L, a delta result at 2 hours and or 4 hours will be needed to further evaluate  If <4 ng/L, and chest pain has been >3 hours since onset, patient may qualify for discharge based on the HEART score in the ED    If <5 ng/L and <3hours since onset of chest pain, a delta result at 2 hours will be needed to further " "evaluate  HS Troponin 99th Percentile URL of a Health Population=12 ng/L with a 95% Confidence Interval of 8-18 ng/L  Second Troponin (time 2 hours)  If calculated delta >= 20 ng/L,  Myocardial injury suggested ; Type of myocardial injury and treatment strategy to be determined  If 5-49 ng/L and the calculated delta is 5-19 ng/L, consult medical service for evaluation  Continue evaluation for ischemia on ecg and other possible etiology and repeat hs troponin at 4 hours  If delta                            is <5 ng/L at 2 hours, consider discharge based on risk stratification via the HEART score (if in ED), or JONA risk score in IP/Observation  HS Troponin 99th Percentile URL of a Health Population=12 ng/L with a 95% Confidence Interval of 8-18 ng/L  • Ventricular Rate 03/19/2023 73  BPM Incomplete   • Atrial Rate 03/19/2023 73  BPM Incomplete   • MN Interval 03/19/2023 186  ms Incomplete   • QRSD Interval 03/19/2023 82  ms Incomplete   • QT Interval 03/19/2023 366  ms Incomplete   • QTC Interval 03/19/2023 403  ms Incomplete   • P Axis 03/19/2023 52  degrees Incomplete   • QRS Axis 03/19/2023 -38  degrees Incomplete   • T Wave Axis 03/19/2023 53  degrees Incomplete   • BNP 03/19/2023 29  0 - 100 pg/mL Final   • POC Glucose 03/19/2023 88  65 - 140 mg/dl Final   • hs TnI 2hr 03/19/2023 6  \"Refer to ACS Flowchart\"- see link ng/L Final    Comment:                                              Initial (time 0) result  If >=50 ng/L, Myocardial injury suggested ;  Type of myocardial injury and treatment strategy  to be determined  If 5-49 ng/L, a delta result at 2 hours and or 4 hours will be needed to further evaluate  If <4 ng/L, and chest pain has been >3 hours since onset, patient may qualify for discharge based on the HEART score in the ED  If <5 ng/L and <3hours since onset of chest pain, a delta result at 2 hours will be needed to further evaluate      HS Troponin 99th Percentile URL of a Health " Population=12 ng/L with a 95% Confidence Interval of 8-18 ng/L  Second Troponin (time 2 hours)  If calculated delta >= 20 ng/L,  Myocardial injury suggested ; Type of myocardial injury and treatment strategy to be determined  If 5-49 ng/L and the calculated delta is 5-19 ng/L, consult medical service for evaluation  Continue evaluation for ischemia on ecg and other possible etiology and repeat hs troponin at 4 hours  If delta                            is <5 ng/L at 2 hours, consider discharge based on risk stratification via the HEART score (if in ED), or JONA risk score in IP/Observation  HS Troponin 99th Percentile URL of a Health Population=12 ng/L with a 95% Confidence Interval of 8-18 ng/L  • Delta 2hr hsTnI 03/19/2023 0  <20 ng/L Final   • Ventricular Rate 03/19/2023 74  BPM Incomplete   • Atrial Rate 03/19/2023 74  BPM Incomplete   • FL Interval 03/19/2023 188  ms Incomplete   • QRSD Interval 03/19/2023 86  ms Incomplete   • QT Interval 03/19/2023 392  ms Incomplete   • QTC Interval 03/19/2023 435  ms Incomplete   • P Axis 03/19/2023 34  degrees Incomplete   • QRS Axis 03/19/2023 -30  degrees Incomplete   • T Wave Axis 03/19/2023 51  degrees Incomplete     Results from last 7 days   Lab Units 03/19/23  1148   POTASSIUM mmol/L 3 8   CHLORIDE mmol/L 105   CO2 mmol/L 26   BUN mg/dL 15   CREATININE mg/dL 0 91   EGFR ml/min/1 73sq m 85   CALCIUM mg/dL 9 6     Results from last 7 days   Lab Units 03/19/23  1148   WBC Thousand/uL 8 32   HEMOGLOBIN g/dL 14 8   PLATELETS Thousands/uL 316           Imaging:  I have personally reviewed all pertinent results  CTA head and neck with and without contrast    Result Date: 3/19/2023  Right frontal lobe cystic lesion with surrounding vasogenic edema most consistent with metastatic disease given findings described below  Local mass effect on the right lateral ventricle without significant midline shift    Contrast-enhanced MRI of the brain recommended for further "evaluation  Right upper lobe mass with partially imaged right hilar adenopathy  Dedicated CT of the chest, abdomen and pelvis is recommended for further evaluation  No evidence for high-grade stenosis, major branch vessel occlusion or vascular aneurysm of the cervical or intracranial vessels  I personally discussed this study with ESAU FANG on 3/19/2023 at 2:08 PM  Workstation performed: [id number]     MRI brain w wo contrast    Result Date: 3/19/2023  Rim-enhancing 2 4 cm centrally necrotic metastatic lesion in the right frontal lobe with surrounding severe vasogenic edema  Mass effect in the right hemisphere with approximately 2 4 mm right to left midline shift  Small focus of anterior cortical frontal acute to subacute ischemia  No evidence of acute intracranial hemorrhage  Findings were marked as \"immediate\"in Epic and will now be related to the ordering physician or covering clinical team by the radiology liaison  Workstation performed: [id number]       EKG, Pathology, and Other Studies:   I have personally reviewed all pertinent results          Cristina Shaver MD, PGY-3  [facility]   3/20/2023      "

## 2023-03-20 NOTE — PHYSICAL THERAPY NOTE
"                                                                                  PHYSICAL THERAPY EVALUATION          Patient Name: Roberta Lozano  [id number] Date: 3/20/2023       03/20/23 0900   PT Last Visit   PT Visit Date 03/20/23   Note Type   Note type Evaluation   Pain Assessment   Pain Assessment Tool 0-10   Pain Score No Pain   Restrictions/Precautions   Weight Bearing Precautions Per Order No   Other Precautions Fall Risk   Home Living   Type of [address] [facility] Multi-level;1/2 bath on main level;Bed/bath upstairs;Stairs to enter without rails  (4-5 ZACH)   Bathroom Shower/Tub Tub/shower unit   Bathroom Toilet Standard   Bathroom Equipment   (None per pt report)   [address]  (PTA didn't use)   Additional Comments Pt reports living with wife in a [facility] with 4-5 ZACH  Pt states his wife works and is away for one week at a time every other week  Prior Function   Level of [place] Independent with ADLs; Independent with functional mobility; Independent with IADLS   Lives With Spouse   Receives Help From Family   IADLs Independent with driving; Independent with medication management; Independent with meal prep   Falls in the last 6 months 1 to 4  (Pt unable to specify)   Vocational Retired   Comments Pt denies the use of any AD for amb PTA   General   Family/Caregiver Present No   Cognition   Overall Cognitive Status WFL   Arousal/Participation Alert   Attention Within functional limits   Orientation Level Oriented X4   Memory Within functional limits   Following Commands Follows one step commands without difficulty   Comments Pt pleasant and cooperative throughout session, willing to mobilize   Subjective   Subjective \"My left side is weaker\"   RLE Assessment   RLE Assessment WFL   Strength RLE   R Hip Flexion 4+/5   R Knee Flexion 4/5   R Knee Extension 4+/5   R Ankle Dorsiflexion 4+/5   R Ankle Plantar Flexion 4/5   LLE Assessment   LLE Assessment WFL " "  Strength LLE   L Hip Flexion 4/5   L Knee Flexion 4/5   L Knee Extension 4/5   L Ankle Dorsiflexion 4-/5   L Ankle Plantar Flexion 4-/5   Bed Mobility   Supine to Sit 5  Supervision   Additional items HOB elevated; Bedrails; Increased time required   Additional Comments Pt greeted in supine, left in bedside chair with all needs within reach   Transfers   Sit to Stand 4  Minimal assistance   Additional items Assist x 1;Bedrails; Increased time required;Verbal cues   Stand to Sit 4  Minimal assistance   Additional items Assist x 1; Armrests; Increased time required;Verbal cues   Additional Comments c RW   Ambulation/Elevation   Gait pattern Improper Weight shift;Decreased foot clearance;Decreased L stance; Inconsistent andrae; Foward flexed; Short stride; Excessively slow; Step through pattern  (Increased hip flexion during swing phase on LLE secondary to L ankle dorsiflexion weakness)   Gait Assistance 4  Minimal assist   Additional items Assist x 1;Verbal cues; Tactile cues   Assistive Device Rolling walker   Distance 70'x2   Stair Management Assistance Not tested   Ambulation/Elevation Additional Comments c RW, pt observed stepping into L side of walker and requires tactile cueing for walker progression as he is observed veering toward left throughout amb   Balance   Static Sitting Fair +   Dynamic Sitting Poor +   Static Standing Fair -   Dynamic Standing Poor +   Ambulatory Poor +   Endurance Deficit   Endurance Deficit No   Activity Tolerance   Activity Tolerance Patient limited by fatigue  (muscle weakness)   Medical Staff Made Aware Obdulio Perry and Lauren Bui OT present for co-evaluation secondary to pt presentation  Massiel Barone present for student observation   Nurse Made Aware clearance per Margot Crawford RN   Assessment   Prognosis Fair   Problem List Decreased strength; Impaired balance;Decreased mobility; Decreased range of motion;Decreased endurance   Assessment Pt presented to [facility] s/p LUE/LE weakness, imaging showing \"Right " "frontal lobe cystic lesion with surrounding vasogenic edema most consistent with metastatic disease\"  PMH includes 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, HTN, prostate cancer  Pt being seen for high complexity PT evaluation due to ongoing medical management for primary diagnosis, continuous pulse oximetry monitoring, utilization of new assistive device, increased risk of falls, and decreased activity tolerance compared to baseline  Pt reports living with spouse in a 3  with 4-5 ZACH without railing  Pt states his wife works full time and is not present in the home every other week for one week at a time  PTA pt was independent with ADLs, IADLs, and functional mobility without use of AD  Pt educated on RW use for amb to decrease risk of falls and increase dynamic standing balance  Pt performs bed mobility with supervision, transfers with Min Ax1, ambulation with Min Ax1 and use of RW  Stairs not appropriate at this time secondary to pt presentation  At conclusion of PT evaluation, pt was seated in chair with all needs within reach  Pt presented at PT evaluation with decreased LLE strength and ROM, impaired static and dynamic balance, decreased endurance, and gait deviations, and will continue to benefit from skilled PT services during hospital stay  Due to the impairments listed above, inaccessible home environment at this time, and decreased caregiver support, PT d/c recommendation when medically cleared is post acute rehabilitation services  Barriers to Discharge Inaccessible home environment;Decreased caregiver support   Goals   Patient Goals To be stronger   STG Expiration Date 03/30/23   Short Term Goal #1 In 10 days pt will complete: 1) Bed mobility skills with Mod I to increase safety and independence as well as decrease caregiver burden  2) Functional transfers with Mod I to promote increased independence, safety, and QOL   3) Ambulate 300' using least restrictive AD with Mod I without LOB " and stable vitals so that pt can negotiate previous living environment safely and promote independence with functional mobility and return to PLOF  4) Stair training up/ down 5+ step/s using rail/s with Mod I so that pt can enter/negotiate previous living environment safely and decrease fall risk  5) Improve balance grades by 1/2 grade to increase safety with all mobility and decrease fall risk  6) Improve BLE strength by 1/2 grade to help increase overall functional mobility and decrease fall risk  PT Treatment Day 0   Plan   Treatment/Interventions Functional transfer training;LE strengthening/ROM; Elevations; Therapeutic exercise; Endurance training;Patient/family training;Equipment eval/education; Bed mobility;Gait training;Spoke to nursing;OT   PT Frequency 3-5x/wk   Recommendation   PT Discharge Recommendation Post acute rehabilitation services   Equipment Recommended [address] [place] [address] Recommended Wheeled walker   Change/add to [facility]? No   AM-PAC Basic Mobility Inpatient   Turning in Flat Bed Without Bedrails 3   Lying on Back to Sitting on Edge of Flat Bed Without Bedrails 3   Moving Bed to Chair 3   Standing Up From Chair Using Arms 3   Walk in Room 3   Climb 3-5 Stairs With Railing 2   Basic Mobility Inpatient Raw Score 17   Basic Mobility Standardized Score 39 67   Highest Level Of Mobility   -HLM Goal 5: Stand one or more mins   -HLM Achieved 7: Walk 25 feet or more   Modified [place] Scale   Modified [place] Scale 2   Barthel Index   Feeding 10   Bathing 0   Grooming Score 0   Dressing Score 5   Bladder Score 10   Bowels Score 10   Toilet Use Score 5   Transfers (Bed/Chair) Score 5   Mobility (Level Surface) Score 0   Stairs Score 0   Barthel Index Score 45   End of Consult   Patient Position at End of Consult Bedside chair; All needs within reach     [facility], [place], [facility]  03/20/23

## 2023-03-20 NOTE — PLAN OF CARE
Problem: MOBILITY - ADULT  Goal: Maintain or return to baseline ADL function  Description: INTERVENTIONS:  -  Assess patient's ability to carry out ADLs; assess patient's baseline for ADL function and identify physical deficits which impact ability to perform ADLs (bathing, care of mouth/teeth, toileting, grooming, dressing, etc )  - Assess/evaluate cause of self-care deficits   - Assess range of motion  - Assess patient's mobility; develop plan if impaired  - Assess patient's need for assistive devices and provide as appropriate  - Encourage maximum independence but intervene and supervise when necessary  - Involve family in performance of ADLs  - Assess for home care needs following discharge   - Consider OT consult to assist with ADL evaluation and planning for discharge  - Provide patient education as appropriate  Outcome: Progressing  Goal: Maintains/Returns to pre admission functional level  Description: INTERVENTIONS:  - Perform BMAT or MOVE assessment daily    - Set and communicate daily mobility goal to care team and patient/family/caregiver  - Collaborate with rehabilitation services on mobility goals if consulted  - Perform Range of Motion 3  times a day  - Reposition patient every 2  hours        - Stand patient 3   times a day  - Ambulate patient3  times a day  - Out of bed to chair 3   times a day     - Out of bed for toileting  - Record patient progress and toleration of activity level   Outcome: Progressing

## 2023-03-20 NOTE — CONSULTS
Consultation - Neurology   Bridgette Bray 69 y o  male MRN: [id number]  Unit/Bed#: [facility] 625-01 Encounter: [id number]      Assessment/Plan     * Brain mass  Assessment & Plan  69year old male with HTN and history of R thalamic CVA about 10 years ago admitted with 1 week history of progressively worsening L sided numbness and weakness  Patient presented to [facility] and underwent CTA head and neck which demonstrated R frontal lobe cystic lesion with surrounding vasogenic edema consistent with metastatic disease as well as RUL mass with right hilar adenopathy  MRI brain was completed and demonstrates rim enhancing centrally nectrotic lesion in the right frontal lobe with surrounding vasogenic edema and also a small focus of anterior frontal acute to subacute ischemia  Ischemia possibly related to underlying tumor and swelling, but cannot entirely exclude embolic infarct secondary to hypercoagulability in setting of likely malignancy  Work-up for brain mass is underway and neurosurgery consult is pending  Plan:   - CT C/A/P with contrast pending    - Neurosurgery consult is pending   - Steroids and AED as per neurosurgery, currently on Decadron 10 mg Q24H and Keppra 500 mg Q12H   - Monitor exam and notify with changes  CVA (cerebral vascular accident) [facility])  Assessment & Plan  - Incidentally noted small focus of anterior frontal acute to subacute ischemia   Infarct possibly related to underlying mass and edema, but cannot entirely exclude embolic infarct in setting of hypercoagulability with possible underlying malignancy,  - Recommend check fasting lipid panel, hemoglobin A1c, TSH    - Check echocardiogram    - Monitor on telemetry    - Recommend continue on ASA 81 mg QD and start atorvastatin 40 mg QPM    - Consider treat with therapeutic Lovenox if lung malignancy is identified   - Goal normothermia, normotension and euglycemia    - PT/OT   - Stroke education    - Patient will ultimately need outpatient follow-up with neurovascular team     Essential hypertension  Assessment & Plan  - Goal normotension    - Management as per medicine team         Betty Campoverde will need follow up in in 6 weeks with neurovascular attending or advance practitioner  He will not require outpatient neurological testing  History of Present Illness     Reason for Consult / Principal Problem: Brain mass/CVA  HPI: Betty Campoverde is a 69 y o male with HTN, prior stroke who presents with complaints of worsening numbness and increased difficulty using his L arm and leg  He reports that he had a stroke about 10 years ago and since that time, he has had numbness on the left side of his body but about 1 week ago, he woke up with worsening numbness and also increased difficulty using the left arm and leg  He notes that this caused him to have difficulty putting on socks and he almost fell while doing that  He notes that with his prior stroke, he did not have difficulty with his motor skills but this time he did and also was having difficulty walking  TO review, patient follows with PCP for HTN  He was most recently seen in September 2022 and at that time was noted to be doing well  Patient previously followed with [facility] and was most recently seen in October 2013  He was following with them for stroke  Per review of note, patient was admitted to [facility] with L sided numbness and dizziness and he was found to have a thalamic stroke which was felt to be secondary to small vessel disease  He was continued on ASA 81 mg QD and also recommended to continue with anti-hypertensives and statin for treatment of underlying HTN and HLD  Patient reports a remote history of smoking tobacco but reports that he quit in his 20s-30s  He also notes he smokes marijuana  He socially uses alcohol  He notes this morning, that he is feeling well but is tearful regarding the CTH findings   He denies any weight loss but notes that he feels he has recently gained weight  He notes that he has a supportive family and that his wife will be here later to visit  Inpatient consult to Neurology  Consult performed by: Eric Renee PA-C  Consult ordered by: Ricarda Cleveland MD          Review of Systems   Constitutional: Positive for unexpected weight change  Negative for chills, fatigue and fever  Weight gain     HENT: Negative for trouble swallowing  Eyes: Negative for visual disturbance  Respiratory: Positive for shortness of breath  Cardiovascular: Negative for chest pain  Gastrointestinal: Negative for abdominal pain, nausea and vomiting  Genitourinary: Negative for difficulty urinating and dysuria  Musculoskeletal: Positive for gait problem  Negative for back pain and neck pain  Skin: Negative for rash  Neurological: Positive for weakness and numbness  Negative for dizziness, facial asymmetry, speech difficulty, light-headedness and headaches  Psychiatric/Behavioral: Positive for confusion         Historical Information   Past Medical History:   Diagnosis Date   • Hypertension    • Prostate cancer ([facility] [address] ) 2001   • Rectal bleeding    • Stroke [facility])     2011       Past Surgical History:   Procedure Laterality Date   • COLONOSCOPY     • PROSTATECTOMY  2001   • TONSILLECTOMY       Social History   Social History     Substance and Sexual Activity   Alcohol Use Yes   • Alcohol/week: 6 0 standard drinks   • Types: 6 Cans of beer per week    Comment: does not drink every day     Social History     Substance and Sexual Activity   Drug Use Not Currently   • Types: Marijuana     E-Cigarette/Vaping   • E-Cigarette Use Never User      E-Cigarette/Vaping Substances   • Nicotine No    • THC Yes    • CBD No    • Flavoring No    • Other No    • Unknown No      Social History     Tobacco Use   Smoking Status Former   • Packs/day: 1 00   • Years: 15 00   • Pack years: 15 00   • Types: Cigarettes   • Passive exposure: Never   Smokeless Tobacco "Never   Tobacco Comments    i quit when i was 25  not sure of exact dates     Family History:   Family History   Problem Relation Age of Onset   • Dementia Mother            • Breast cancer Sister            • Prostate cancer Brother         Received Radiation       Review of previous medical records was completed  Please see HPI  Meds/Allergies   Scheduled Meds:  Current Facility-Administered Medications   Medication Dose Route Frequency Provider Last Rate   • amLODIPine  10 mg Oral Daily Juan Francisco Harris MD     • aspirin  81 mg Oral Daily Juan Francisco Harris MD     • dexamethasone  10 mg Intravenous Q24H Juan Francisco Harris MD     • enoxaparin  40 mg Subcutaneous Daily Juan Francisco Harris MD     • levETIRAcetam  500 mg Intravenous Q12H [facility] Juan Francisco Harris  mg (23 0143)   • losartan  50 mg Oral Daily Juan Francisco Harris MD       Continuous Infusions:   PRN Meds:  No Known Allergies    Objective   Vitals:Blood pressure 149/86, pulse 75, temperature 98 1 °F (36 7 °C), resp  rate 18, height 5' 9\" (1 753 m), weight 82 6 kg (182 lb), SpO2 97 %  ,Body mass index is 26 88 kg/m²  No intake or output data in the 24 hours ending 23 0747    Invasive Devices: Invasive Devices     Peripheral Intravenous Line  Duration           Peripheral IV 23 Proximal;Right;Ventral (anterior) Forearm <1 day                Physical Exam  Constitutional:       General: He is not in acute distress  Appearance: Normal appearance  He is not ill-appearing, toxic-appearing or diaphoretic  HENT:      Head: Normocephalic and atraumatic  Mouth/Throat:      Mouth: Mucous membranes are moist       Pharynx: Oropharynx is clear  No oropharyngeal exudate or posterior oropharyngeal erythema  Eyes:      General: No scleral icterus  Right eye: No discharge  Left eye: No discharge  Extraocular Movements: Extraocular movements intact        Conjunctiva/sclera: Conjunctivae normal       Pupils: Pupils are equal, " round, and reactive to light  Cardiovascular:      Rate and Rhythm: Normal rate and regular rhythm  Pulmonary:      Effort: Pulmonary effort is normal  No respiratory distress  Breath sounds: Normal breath sounds  Abdominal:      General: There is no distension  Palpations: Abdomen is soft  Tenderness: There is no abdominal tenderness  Musculoskeletal:         General: Normal range of motion  Cervical back: Normal range of motion and neck supple  Right lower leg: No edema  Left lower leg: No edema  Skin:     General: Skin is warm and dry  Findings: No erythema or rash  Neurological:      Mental Status: He is alert and oriented to person, place, and time  Coordination: Finger-Nose-Finger Test abnormal (Left) and Heel to [facility] Test abnormal (Left)  Deep Tendon Reflexes:      Reflex Scores:       Bicep reflexes are 2+ on the right side and 2+ on the left side  Brachioradialis reflexes are 2+ on the right side and 2+ on the left side  Patellar reflexes are 2+ on the right side and 2+ on the left side  Achilles reflexes are 1+ on the right side and 1+ on the left side  Psychiatric:         Mood and Affect: Mood normal          Speech: Speech normal          Behavior: Behavior normal        Neurologic Exam     Mental Status   Oriented to person, place, and time  Oriented to person  Oriented to place  Oriented to time  Registration: recalls 3 of 3 objects  Recall at 5 minutes: recalls 3 of 3 objects (Recalled 2 objects without cue and 1 object with cue)  Attention: normal  Concentration: normal    Speech: speech is normal   Knowledge: good  Able to name object  Able to repeat  Normal comprehension  Cranial Nerves     CN II   Right visual field deficit: none  Left visual field deficit: none     CN III, IV, VI   Pupils are equal, round, and reactive to light  Right pupil: Size: 3 mm  Reactivity: brisk  Consensual response: intact  Left pupil: Size: 3 mm  Reactivity: brisk  Consensual response: intact     Nystagmus: none   Diplopia: none  Ophthalmoparesis: none  Upgaze: normal  Downgaze: normal  Conjugate gaze: present    CN V   Right facial sensation deficit: none  Left facial sensation deficit: none    CN VII   Right facial weakness: none  Left facial weakness: none    CN VIII   Hearing: intact    CN IX, X   Palate: symmetric    CN XI   Right sternocleidomastoid strength: normal  Left sternocleidomastoid strength: normal    CN XII   Tongue: not atrophic  Fasciculations: absent  Tongue deviation: none    Motor Exam   Muscle bulk: normal  Overall muscle tone: normal  Right arm tone: normal  Left arm tone: normal  Right arm pronator drift: absent  Left arm pronator drift: present  Right leg tone: normal  Left leg tone: normalMotor strength RUE and RLE 5/5, LUE and LLE 4+/5       Sensory Exam   Right arm light touch: normal  Left arm light touch: normal  Right leg light touch: normal  Left leg light touch: normal  Right arm vibration: normal  Left arm vibration: normal  Right leg vibration: normal  Left leg vibration: normal  Sensation intact to temperature B/L UE and LE     Gait, Coordination, and Reflexes     Gait  Gait: (Deferred for safety)    Coordination   Finger to nose coordination: abnormal (Left)  Heel to shin coordination: abnormal (Left)    Tremor   Resting tremor: absent  Intention tremor: absent  Action tremor: absent    Reflexes   Right brachioradialis: 2+  Left brachioradialis: 2+  Right biceps: 2+  Left biceps: 2+  Right patellar: 2+  Left patellar: 2+  Right achilles: 1+  Left achilles: 1+  Right plantar: normal  Left plantar: normal      Lab Results:   Recent Results (from the past 24 hour(s))   ECG 12 lead    Collection Time: 03/19/23 11:47 AM   Result Value Ref Range    Ventricular Rate 73 BPM    Atrial Rate 73 BPM    NC Interval 186 ms    QRSD Interval 82 ms    QT Interval 366 ms    QTC Interval 403 ms    P Axis 52 degrees "   QRS Axis -38 degrees    T Wave Axis 53 degrees   Basic metabolic panel    Collection Time: 03/19/23 11:48 AM   Result Value Ref Range    Sodium 140 135 - 147 mmol/L    Potassium 3 8 3 5 - 5 3 mmol/L    Chloride 105 96 - 108 mmol/L    CO2 26 21 - 32 mmol/L    ANION GAP 9 4 - 13 mmol/L    BUN 15 5 - 25 mg/dL    Creatinine 0 91 0 60 - 1 30 mg/dL    Glucose 103 65 - 140 mg/dL    Calcium 9 6 8 4 - 10 2 mg/dL    eGFR 85 ml/min/1 73sq m   CBC and Platelet    Collection Time: 03/19/23 11:48 AM   Result Value Ref Range    WBC 8 32 4 31 - 10 16 Thousand/uL    RBC 4 88 3 88 - 5 62 Million/uL    Hemoglobin 14 8 12 0 - 17 0 g/dL    Hematocrit 44 5 36 5 - 49 3 %    MCV 91 82 - 98 fL    MCH 30 3 26 8 - 34 3 pg    MCHC 33 3 31 4 - 37 4 g/dL    RDW 12 2 11 6 - 15 1 %    Platelets 475 323 - 252 Thousands/uL    MPV 9 6 8 9 - 12 7 fL   Protime-INR    Collection Time: 03/19/23 11:48 AM   Result Value Ref Range    Protime 12 8 11 6 - 14 5 seconds    INR 0 96 0 84 - 1 19   APTT    Collection Time: 03/19/23 11:48 AM   Result Value Ref Range    PTT 27 23 - 37 seconds   HS Troponin 0hr (reflex protocol)    Collection Time: 03/19/23 11:48 AM   Result Value Ref Range    hs TnI 0hr 6 \"Refer to ACS Flowchart\"- see link ng/L   B-Type Natriuretic Peptide(BNP)    Collection Time: 03/19/23 11:48 AM   Result Value Ref Range    BNP 29 0 - 100 pg/mL   Fingerstick Glucose (POCT)    Collection Time: 03/19/23 12:04 PM   Result Value Ref Range    POC Glucose 88 65 - 140 mg/dl   ECG 12 lead    Collection Time: 03/19/23  1:42 PM   Result Value Ref Range    Ventricular Rate 74 BPM    Atrial Rate 74 BPM    OR Interval 188 ms    QRSD Interval 86 ms    QT Interval 392 ms    QTC Interval 435 ms    P Axis 34 degrees    QRS Axis -30 degrees    T Wave Trimble 51 degrees   HS Troponin I 2hr    Collection Time: 03/19/23  1:45 PM   Result Value Ref Range    hs TnI 2hr 6 \"Refer to ACS Flowchart\"- see link ng/L    Delta 2hr hsTnI 0 <20 ng/L   Platelet count    Collection " Time: 03/20/23  1:36 AM   Result Value Ref Range    Platelets 168 010 - 527 Thousands/uL    MPV 9 4 8 9 - 12 7 fL         Imaging Studies: I have personally reviewed pertinent reports  and I have personally reviewed pertinent films in PACS  CTA head and neck with and without contrast- Right frontal lobe cystic lesion with surrounding vasogenic edema most consistent with metastatic disease  Local mass effect on the right lateral ventricle without significant midline shift  Contrast-enhanced MRI of the brain recommended for further evaluation  No Right upper lobe mass with partially imaged right hilar adenopathy  Dedicated CT of the chest, abdomen, and pelvis is recommended for further evaluation  No evidence for high-grade stenosis, major branch vessel occlusion or vascular aneurysm of the cervical or intracranial vessels  MRI brain with and without contrast- Rim-enhancing 2 4 cm centrally necrotic metastatic lesion in the right frontal lobe with surrounding severe vasogenic edema  Mass effect in the right hemisphere with approximately 2 4 mm right to left midline shift  Small focus of anterior cortical frontal acute to subacute ischemia  No evidence of acute intracranial hemorrhage      VTE Prophylaxis: Enoxaparin (Lovenox)

## 2023-03-20 NOTE — PLAN OF CARE
Problem: OCCUPATIONAL THERAPY ADULT  Goal: Performs self-care activities at highest level of function for planned discharge setting  See evaluation for individualized goals  Description: Treatment Interventions: ADL retraining, Functional transfer training, Endurance training, Patient/family training, Equipment evaluation/education, Compensatory technique education, Continued evaluation, Energy conservation, Activityengagement          See flowsheet documentation for full assessment, interventions and recommendations  Note: Limitation: Decreased ADL status, Decreased Safe judgement during ADL, Decreased self-care trans, Decreased high-level ADLs, Decreased endurance  Prognosis: Good  Assessment: Pt is a 69 y o  male seen for OT evaluation s/p admission to [facility] on 3/20/2023 due to inability to walk, bend knees, weight gain, and decreased strength in LUE  Pt diagnosed with Brain mass  Per imaging results, Right frontal lobe cystic lesion with surrounding vasogenic edema most consistent with metastatic disease  Pt has a significant PMH impacting occupational performance including: HTN and CVA  PTA, pt living in [facility] with wife, (I) with ADLs, (I) with IADLs, (+) falls, (-) driving, and no use of AD for functional mobility  Pt is motivated to return to home  Personal and environmental factors supporting pt at time of IE include age  Personal and environmental factors inhibiting engagement in occupations include limited social support, inaccessible home environment, inaccessible bathroom environment, difficulty completing ADLs and difficulty completing IADLs  During evaluation pt performed as is outlined above in flowsheet  Pt required verbal cues for hand placement and walker position  Standardized assessments used to assist in identifying performance deficits include AMPAC 6-Clicks   Performance deficits that affect the pt’s occupational performance during the initial evaluation include impaired balance, functional mobility, endurance, activity tolerance, forward functional reach, functional standing tolerance and overall strength  Based on pt’s functional performance and deficits the following occupations will be addressed in OT treatments in order to maximize pt’s independence and overall occupational performance: grooming, bathing/showering, toileting and toilet hygiene, dressing and functional mobility  Goals are listed below  Upon discharge from acute care setting recommend d/c to [address] [place] [address]       OT Discharge Recommendation: Post acute rehabilitation services

## 2023-03-20 NOTE — CONSULTS
Deanna Lau [facility] [address] 1955, 69 y o  male MRN: [id number]  Unit/Bed#: [facility] 625-01 Encounter: [id number]  Primary Care Provider: Arnol Rios DO   Date and time admitted to hospital: 3/20/2023 12:13 AM    Inpatient consult to Neurosurgery  Consult performed by: Basia Burton PA-C  Consult ordered by: Cheyenne Siegel MD        * Brain mass  Assessment & Plan  R frontal brain mass   - presented to the [facility] ED on 3/19 with progressively worsening LUE and LLE weakness  - concern for possible metastatic disease given noted lung mass on imaging   - pt reports remote hx of prostate CA s/p prostatectomy several yrs ago and no further intervention or f/u since    Imaging:   · 3/19/2023 MRI brain w/wo: Rim-enhancing 2 4 cm centrally necrotic metastatic lesion in the right frontal lobe with surrounding severe vasogenic edema  Mass effect in the right hemisphere with approximately 2 4 mm right to left midline shift  Small focus of anterior cortical frontal acute to subacute ischemia   No evidence of acute intracranial hemorrhage    Plan:   · Continue to closely monitor neuro exam at this time   · frequent neuro checks per primary team   · Repeat STAT CTH with any acute decline in GCS > 2pts or more   · Maintain normotensive BP goals, SBP < 160   · No acute/emergent neuorsurgical intervention indicated at this time   · Imaging and case reviewed by nsgy team  · Ongoing discussion amongst nsgy team in regard to surgical plans    · Should decision for surgery be made, pt will need to undergo medical clearance for OR   · Pt needs further workup   · Continue ongoing evaluation of new brain mass/metastatic workup  · Continue keppra 500mg q 12hrs   · Recommend transitioning from decadron 10mg qd to 4mg q 6hrs   · Recommend holding home ASA given possible need for neurosurgical intervention   · DVT ppx: SCDs, lovenox   · Pain control per primary team   · PT/OT · Continue medical management per primary team   · Consider heme/onc and rad/onc consults   · Social work following for assistance with dispo once medically stable/cleared     Neurosurgery will continue to closely follow  Please reach out with any further questions or concerns  Brain compression [facility])  Assessment & Plan  - see plan as further documented above     Cerebral edema [facility])  Assessment & Plan  - see plan as further documented above       History of Present Illness   HPI: Art Nova is a 69y o  year old male with PMH significant for HTN and 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 on daily ASA 81mg who initially presented to the [place] [facility] ER yesterday on 3/20 for progressively worsening L sided weakness x 7 days  Patient states that over the last week he has noted progressively worsening left upper extremity left lower extremity weakness  Patient states his daughter and his wife convinced him to seek care in the emergency department yesterday evening for the symptoms  Patient states that he is was always able to lift his arm and leg up against gravity but he was unable to really  things with his left hand and he felt that his left foot was dragging when walking  Upon arrival to the ER on 3/19, the patient underwent CT head for further evaluation of his symptoms  Patient was noted have a right frontal brain mass surrounding vasogenic edema and brain compression  Patient was started on Keppra and given a dose of IV Decadron and transferred to [facility] for admission to medicine and neurosurgery consultation  Upon my arrival to the bedside, the patient is overwhelmed by this diagnosis but appears to be in good spirits overall  Patient again described the above-noted story  Patient with left upper extremity left lower extremity weakness rated 4 out of 5 throughout    Patient told me that he feels that his weakness has improved somewhat since being started on steroids and admitted to the hospital   Patient denies any headaches, dizziness, vision changes, nausea/vomiting, weakness, increased numbness, falls, speech changes, altered mental status  Review of Systems   Constitutional: Negative for activity change, appetite change, chills, fever and unexpected weight change  Eyes: Negative for photophobia and visual disturbance  Respiratory: Negative for cough, choking, shortness of breath and wheezing  Cardiovascular: Negative for chest pain and palpitations  Gastrointestinal: Negative for abdominal pain, diarrhea, nausea and vomiting  Musculoskeletal: Negative for arthralgias, back pain, gait problem, joint swelling, myalgias, neck pain and neck stiffness  Skin: Negative for wound  Neurological: Positive for weakness  Negative for dizziness, tremors, seizures, syncope, facial asymmetry, speech difficulty, light-headedness, numbness and headaches  Psychiatric/Behavioral: Negative for agitation, behavioral problems, confusion and decreased concentration  The patient is not nervous/anxious        Historical Information   Past Medical History:   Diagnosis Date   • Hypertension    • Prostate cancer ([facility] [address] ) 2001   • Rectal bleeding    • Stroke [facility])     2011       Past Surgical History:   Procedure Laterality Date   • COLONOSCOPY     • PROSTATECTOMY  2001   • TONSILLECTOMY       Social History     Substance and Sexual Activity   Alcohol Use Yes   • Alcohol/week: 6 0 standard drinks   • Types: 6 Cans of beer per week    Comment: does not drink every day     Social History     Substance and Sexual Activity   Drug Use Not Currently   • Types: Marijuana     Social History     Tobacco Use   Smoking Status Former   • Packs/day: 1 00   • Years: 15 00   • Pack years: 15 00   • Types: Cigarettes   • Passive exposure: Never   Smokeless Tobacco Never   Tobacco Comments    i quit when i was 25  not sure of exact dates     Family History   Problem Relation Age of Onset   • Dementia Mother    • Breast cancer Sister            • Prostate cancer Brother         Received Radiation     Meds/Allergies   all current active meds have been reviewed  No Known Allergies    Objective   I/O        07 07 07 07 07 0700    P  O    465    Total Intake(mL/kg)   465 (5 6)    Net   +465           Unmeasured Urine Occurrence   1 x    Unmeasured Stool Occurrence   0 x          Physical Exam  Constitutional:       General: He is not in acute distress  Appearance: Normal appearance  He is normal weight  He is not ill-appearing or toxic-appearing  Comments: Alert, pleasant, middle aged male   Sitting comfortably in the chair   In no acute distress    HENT:      Head: Normocephalic and atraumatic  Right Ear: External ear normal       Left Ear: External ear normal       Nose: Nose normal  No congestion or rhinorrhea  Mouth/Throat:      Mouth: Mucous membranes are moist    Eyes:      General: No scleral icterus  Right eye: No discharge  Left eye: No discharge  Extraocular Movements: Extraocular movements intact  Conjunctiva/sclera: Conjunctivae normal       Pupils: Pupils are equal, round, and reactive to light  Cardiovascular:      Rate and Rhythm: Normal rate  Pulses: Normal pulses  Pulmonary:      Effort: Pulmonary effort is normal  No respiratory distress  Comments: No resp distress on room air   Abdominal:      General: Abdomen is flat  Musculoskeletal:         General: No tenderness, deformity or signs of injury  Normal range of motion  Cervical back: Normal range of motion and neck supple  No rigidity or tenderness  Right lower leg: No edema  Left lower leg: No edema  Skin:     General: Skin is warm and dry  Capillary Refill: Capillary refill takes less than 2 seconds  Findings: No lesion  Neurological:      General: No focal deficit present        Mental Status: He is "alert and oriented to person, place, and time  Mental status is at baseline  Cranial Nerves: No cranial nerve deficit  Sensory: No sensory deficit  Motor: No weakness  Coordination: Coordination normal       Gait: Gait normal       Deep Tendon Reflexes: Reflexes normal       Comments: GCS 15   A&Ox3   Appropriately answering questions and following commands   No dysarthria or aphasia   No appreciated CN deficits   L sided weakness appreciated   - LUE rated 4/5 throughout   - LLE rated 4/5 throughout   RUE and RLE with intact strength rated 5/5 throughout   Sensation intact to light touch to franco UE and franco LE   LUE drift noted      Psychiatric:         Mood and Affect: Mood normal          Behavior: Behavior normal          Thought Content: Thought content normal          Judgment: Judgment normal        Neurologic Exam     Mental Status   Oriented to person, place, and time  Cranial Nerves     CN III, IV, VI   Pupils are equal, round, and reactive to light  Vitals:Blood pressure 149/86, pulse 75, temperature 98 1 °F (36 7 °C), resp  rate 18, height 5' 9\" (1 753 m), weight 82 6 kg (182 lb), SpO2 97 %  ,Body mass index is 26 88 kg/m²  Lab Results:   Results from last 7 days   Lab Units 03/20/23  0136 03/19/23  1148   WBC Thousand/uL  --  8 32   HEMOGLOBIN g/dL  --  14 8   HEMATOCRIT %  --  44 5   PLATELETS Thousands/uL 287 316     Results from last 7 days   Lab Units 03/19/23  1148   POTASSIUM mmol/L 3 8   CHLORIDE mmol/L 105   CO2 mmol/L 26   BUN mg/dL 15   CREATININE mg/dL 0 91   CALCIUM mg/dL 9 6             Results from last 7 days   Lab Units 03/19/23  1148   INR  0 96   PTT seconds 27     No results found for: TROPONINT  ABG:No results found for: PHART, LBP0VDQ, PO2ART, DMC1JHE, P1YIKHTP, BEART, SOURCE    Imaging Studies: I have personally reviewed pertinent reports      CTA head and neck with and without contrast    Result Date: 3/19/2023  Narrative: CTA NECK AND BRAIN WITH AND " WITHOUT CONTRAST INDICATION: weakness   History of stroke  Prostate cancer  COMPARISON:   CT from 2/11/2020 TECHNIQUE:  Routine CT imaging of the Brain without contrast   Post contrast imaging was performed after administration of iodinated contrast through the neck and brain  Post contrast axial 0 625 mm images timed to opacify the arterial system  3D rendering was performed on an independent workstation  MIP reconstructions performed  Coronal reconstructions were performed of the noncontrast portion of the brain  Radiation dose length product (DLP) for this visit:  1279 mGy-cm   This examination, like all CT scans performed in the [facility], was performed utilizing techniques to minimize radiation dose exposure, including the use of iterative reconstruction and automated exposure control  IV Contrast:  85 mL of iohexol (OMNIPAQUE)  IMAGE QUALITY:   Diagnostic FINDINGS: NONCONTRAST BRAIN PARENCHYMA:  There is a cystic lesion within the right frontal lobe measuring 2 0 x 2 2 cm  There is regional vasogenic edema  There is local mass effect on the right lateral ventricle with displacement inferiorly  There is no significant midline shift  There is no evidence for acute intracranial hemorrhage  There is no CT evidence for a large acute vascular distribution infarct  There is a chronic right thalamocapsular lacunar infarct, stable  VENTRICLES AND EXTRA-AXIAL SPACES:  Normal for the patient's age  VISUALIZED ORBITS: Normal visualized orbits  PARANASAL SINUSES: Normal visualized paranasal sinuses  CERVICAL VASCULATURE AORTIC ARCH AND GREAT VESSELS:  Normal aortic arch and great vessel origins  Normal visualized subclavian vessels  RIGHT VERTEBRAL ARTERY CERVICAL SEGMENT:  Mild atherosclerotic plaque at the origin with mild narrowing  The vessel is normal in caliber throughout the neck   LEFT VERTEBRAL ARTERY CERVICAL SEGMENT:  Tortuosity and narrowing with atherosclerotic plaque at the origin of the left vertebral artery  The vessel is normal in caliber throughout the neck  RIGHT EXTRACRANIAL CAROTID SEGMENT:  Mild atherosclerotic disease of the distal common carotid artery and proximal cervical internal carotid artery without significant stenosis compared to the more distal ICA  LEFT EXTRACRANIAL CAROTID SEGMENT:  Mild atherosclerotic disease of the distal common carotid artery and proximal cervical internal carotid artery without significant stenosis compared to the more distal ICA  NASCET criteria was used to determine the degree of internal carotid artery diameter stenosis  INTRACRANIAL VASCULATURE INTERNAL CAROTID ARTERIES:  Mild atherosclerotic plaque along the cavernous segments  No evidence for high-grade stenosis or focal occlusion  ANTERIOR CIRCULATION:  Symmetric A1 segments and anterior cerebral arteries with normal enhancement  Normal anterior communicating artery  MIDDLE CEREBRAL ARTERY CIRCULATION:  M1 segment and middle cerebral artery branches demonstrate normal enhancement bilaterally  DISTAL VERTEBRAL ARTERIES:  Normal distal vertebral arteries  Posterior inferior cerebellar artery origins are normal  Normal vertebral basilar junction  BASILAR ARTERY:  Basilar artery is normal in caliber  Normal superior cerebellar arteries  POSTERIOR CEREBRAL ARTERIES: Both posterior cerebral arteries arises from the basilar tip  Both arteries demonstrate normal enhancement  There is a right-sided posterior communicating artery  VENOUS STRUCTURES:  Normal  NON VASCULAR ANATOMY BONY STRUCTURES:  There is cervical spondylosis  No discrete lytic or blastic osseous lesions identified within the visualized osseous structures  SOFT TISSUES OF THE NECK:  Unremarkable  THORACIC INLET:  There is a right upper lobe mass measuring 3 3 x 3 6 cm  There is partially imaged right hilar adenopathy       Impression: Right frontal lobe cystic lesion with surrounding vasogenic edema most consistent with metastatic disease given findings described below  Local mass effect on the right lateral ventricle without significant midline shift  Contrast-enhanced MRI of the brain recommended for further evaluation  Right upper lobe mass with partially imaged right hilar adenopathy  Dedicated CT of the chest, abdomen and pelvis is recommended for further evaluation  No evidence for high-grade stenosis, major branch vessel occlusion or vascular aneurysm of the cervical or intracranial vessels  I personally discussed this study with ESAU FANG on 3/19/2023 at 2:08 PM  Workstation performed: [id number]     XR chest 1 view portable    Result Date: 3/20/2023  Narrative: CHEST INDICATION:   sob  COMPARISON:  2/11/2020 EXAM PERFORMED/VIEWS:  XR CHEST PORTABLE FINDINGS: Cardiomediastinal silhouette appears unremarkable  Peripheral right upper lung 4 0 cm mass; known  CT thorax already recommended  The lungs are clear  No pneumothorax or pleural effusion  Osseous structures appear within normal limits for patient age  Impression: No acute cardiopulmonary disease  Right upper lung mass  Workstation performed: [id number]     MRI brain w wo contrast    Result Date: 3/19/2023  Narrative: MRI BRAIN WITH AND WITHOUT CONTRAST INDICATION: Lower extremity weakness  COMPARISON:  Same date CT TECHNIQUE: Multiplanar, multisequence imaging of the brain was performed before and after gadolinium administration  IV Contrast:  7 mL of Gadobutrol injection (SINGLE-DOSE)  IMAGE QUALITY:   Diagnostic  FINDINGS: BRAIN PARENCHYMA: Rim-enhancing 2 4 cm centrally necrotic metastatic lesion in the right frontal lobe with surrounding severe vasogenic edema  Mass effect in the right hemisphere with approximately 2 4 mm right to left midline shift   Single focus of diffusion restriction with T-2/flair signal abnormality in the anterior right frontal lobe anterior to the cystic metastatic lesion suggesting a focus of cortical frontal anterior acute "ischemia  No evidence of acute intracranial hemorrhage  VENTRICLES:  Normal for the patient's age  SELLA AND PITUITARY GLAND:  Normal  ORBITS:  Normal  PARANASAL SINUSES:  Normal  VASCULATURE:  Evaluation of the major intracranial vasculature demonstrates appropriate flow voids  CALVARIUM AND SKULL BASE:  Normal  EXTRACRANIAL SOFT TISSUES:  Normal      Impression: Rim-enhancing 2 4 cm centrally necrotic metastatic lesion in the right frontal lobe with surrounding severe vasogenic edema  Mass effect in the right hemisphere with approximately 2 4 mm right to left midline shift  Small focus of anterior cortical frontal acute to subacute ischemia  No evidence of acute intracranial hemorrhage  Findings were marked as \"immediate\"in Epic and will now be related to the ordering physician or covering clinical team by the radiology liaison  Workstation performed: [id number]     EKG, Pathology, and Other Studies: I have personally reviewed pertinent reports  VTE Prophylaxis: Sequential compression device (Venodyne) ,Lovenox     Code Status: Level 1 - Full Code  Advance Directive and Living Will:      Power of :    POLST:      Counseling / Coordination of Care  I spent 30 minutes with the patient    "

## 2023-03-20 NOTE — ASSESSMENT & PLAN NOTE
Hx CVA 11-12 years ago with mild residual L sided deficits, baseline fully functional motor  - on aspirin 81 mg

## 2023-03-20 NOTE — PLAN OF CARE
Problem: MOBILITY - ADULT  Goal: Maintain or return to baseline ADL function  Description: INTERVENTIONS:  -  Assess patient's ability to carry out ADLs; assess patient's baseline for ADL function and identify physical deficits which impact ability to perform ADLs (bathing, care of mouth/teeth, toileting, grooming, dressing, etc )  - Assess/evaluate cause of self-care deficits   - Assess range of motion  - Assess patient's mobility; develop plan if impaired  - Assess patient's need for assistive devices and provide as appropriate  - Encourage maximum independence but intervene and supervise when necessary  - Involve family in performance of ADLs  - Assess for home care needs following discharge   - Consider OT consult to assist with ADL evaluation and planning for discharge  - Provide patient education as appropriate  Outcome: Progressing  Goal: Maintains/Returns to pre admission functional level  Description: INTERVENTIONS:  - Perform BMAT or MOVE assessment daily    - Set and communicate daily mobility goal to care team and patient/family/caregiver  - Collaborate with rehabilitation services on mobility goals if consulted  - Perform Range of Motion  times a day  - Reposition patient every  hours    - Dangle patient  times a day  - Stand patient  times a day  - Ambulate patient  times a day  - Out of bed to chair  times a day   - Out of bed for nerico times a day  - Out of bed for toileting  - Record patient progress and toleration of activity level   Outcome: Progressing

## 2023-03-20 NOTE — ED NOTES
Patient left with all personal belongings , wife remains at the bedside , wife is going to follow transport to facility      Ayn Granger RN  03/19/23 0155

## 2023-03-20 NOTE — ASSESSMENT & PLAN NOTE
69year old male with HTN and history of R thalamic CVA about 10 years ago admitted with 1 week history of progressively worsening L sided numbness and weakness  Patient presented to [facility] and underwent CTA head and neck which demonstrated R frontal lobe cystic lesion with surrounding vasogenic edema consistent with metastatic disease as well as RUL mass with right hilar adenopathy  MRI brain was completed and demonstrates rim enhancing centrally nectrotic lesion in the right frontal lobe with surrounding vasogenic edema and also a small focus of anterior frontal acute to subacute ischemia  Ischemia possibly related to underlying tumor and swelling, but cannot entirely exclude embolic infarct secondary to hypercoagulability in setting of likely malignancy  Work-up for brain mass is underway and neurosurgery consult is pending  Plan:   - CT C/A/P with contrast pending    - Neurosurgery consult is pending   - Steroids and AED as per neurosurgery, currently on Decadron 10 mg Q24H and Keppra 500 mg Q12H   - Monitor exam and notify with changes

## 2023-03-20 NOTE — PLAN OF CARE
"  Problem: PHYSICAL THERAPY ADULT  Goal: Performs mobility at highest level of function for planned discharge setting  See evaluation for individualized goals  Description: Treatment/Interventions: Functional transfer training, LE strengthening/ROM, Elevations, Therapeutic exercise, Endurance training, Patient/family training, Equipment eval/education, Bed mobility, Gait training, Spoke to nursing, OT  Equipment Recommended: Mely Duty       See flowsheet documentation for full assessment, interventions and recommendations  Note: Prognosis: Fair  Problem List: Decreased strength, Impaired balance, Decreased mobility, Decreased range of motion, Decreased endurance  Assessment: Pt presented to [facility] s/p LUE/LE weakness, imaging showing \"Right frontal lobe cystic lesion with surrounding vasogenic edema most consistent with metastatic disease\"  PMH includes 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, HTN, prostate cancer  Pt being seen for high complexity PT evaluation due to ongoing medical management for primary diagnosis, continuous pulse oximetry monitoring, utilization of new assistive device, increased risk of falls, and decreased activity tolerance compared to baseline  Pt reports living with spouse in a 3 SH with 4-5 ZACH without railing  Pt states his wife works full time and is not present in the home every other week for one week at a time  PTA pt was independent with ADLs, IADLs, and functional mobility without use of AD  Pt educated on RW use for amb to decrease risk of falls and increase dynamic standing balance  Pt performs bed mobility with supervision, transfers with Min Ax1, ambulation with Min Ax1 and use of RW  Stairs not appropriate at this time secondary to pt presentation  At conclusion of PT evaluation, pt was seated in chair with all needs within reach   Pt presented at PT evaluation with decreased LLE strength and ROM, impaired static and dynamic balance, decreased endurance, and gait " deviations, and will continue to benefit from skilled PT services during hospital stay  Due to the impairments listed above, inaccessible home environment at this time, and decreased caregiver support, PT d/c recommendation when medically cleared is post acute rehabilitation services  Barriers to Discharge: Inaccessible home environment, Decreased caregiver support     PT Discharge Recommendation: Post acute rehabilitation services    See flowsheet documentation for full assessment

## 2023-03-20 NOTE — CASE MANAGEMENT
Case Management Assessment & Discharge Planning Note    Patient name Griffin Curling  Location [address] MRN [id number]  : 1955 Date 3/20/2023       Current Admission Date: 3/20/2023  Current Admission Diagnosis:Brain mass   Patient Active Problem List    Diagnosis Date Noted   • Brain mass 2023   • Hypercholesterolemia 2021   • CVA (cerebral vascular accident) ([address] ) 2021   • Essential hypertension 2020   • Annual physical exam 2020   • Negative depression screening 2020   • Overweight (BMI 25 0-29 9) 2020      LOS (days): 1  Geometric Mean LOS (GMLOS) (days):   Days to GMLOS:     OBJECTIVE:      Current admission status: Observation       Preferred Pharmacy:    [place] [address]  Phone: [phone] Fax: [phone]    Primary Care Provider: Angela Adler DO    Primary Insurance: BLUE CROSS  Secondary Insurance:     ASSESSMENT:  [address] [facility], [address] [facility] Representative - Spouse   Primary Phone: [phone] (Mobile)                 Obs Notice Signed: 23    Patient Information  Admitted from[de-identified] Home  Mental Status: Alert  During Assessment patient was accompanied by: Not accompanied during assessment  Assessment information provided by[de-identified] Patient  Primary Caregiver: Self  Support Systems: Spouse/significant other  [place] of Residence: [address] do you live in?: [facility] [address] entry access options   Select all that apply : Stairs  Number of steps to enter home : 5  Do the steps have railings?: Yes  Type of Current Residence: [address] [place] home  Upon entering residence, is there a bedroom on the main floor (no further steps)?: No  A bedroom is located on the following floor levels of residence (select all that apply):: 2nd Floor  Upon entering residence, is there a bathroom on the main floor (no further steps)?: Yes  Number of steps to 2nd floor from main floor: One Flight  In the last 12 months, was there a time when you were not able to pay the mortgage or rent on time?: No  In the last 12 months, how many places have you lived?: 1  In the last 12 months, was there a time when you did not have a steady place to sleep or slept in a shelter (including now)?: No  Homeless/housing insecurity resource given?: N/A  Living Arrangements: Lives w/ Spouse/significant other  Is patient a ?: No    Activities of Daily Living Prior to Admission  Functional Status: Independent  Completes ADLs independently?: Yes  Ambulates independently?: Yes  Does patient currently own DME?: Yes  What DME does the patient currently own?: [employer]  Does patient have a history of Outpatient Therapy (PT/OT)?: Yes (SL OPPT)  Does the patient have a history of Short-Term Rehab?: No  Does patient have a history of HHC?: No  Does patient currently have [facility]?: No    Patient Information Continued  Income Source: Pension/detention  Does patient have prescription coverage?: Yes  Within the past 12 months, you worried that your food would run out before you got the money to buy more : Never true  Within the past 12 months, the food you bought just didn't last and you didn't have money to get more : Never true  Food insecurity resource given?: N/A  Does patient receive dialysis treatments?: No  Does patient have a history of substance abuse?: No  Does patient have a history of Mental Health Diagnosis?: No    Means of Transportation  Means of Transport to Appts[de-identified] Family transport  In the past 12 months, has lack of transportation kept you from medical appointments or from getting medications?: No  In the past 12 months, has lack of transportation kept you from meetings, work, or from getting things needed for daily living?: No  Was application for public transport provided?: N/A        DISCHARGE DETAILS:    Discharge planning discussed with[de-identified] Patient  Freedom of Choice: "Yes  Comments - Freedom of Choice: Discussed FOC  CM contacted family/caregiver?: No- see comments (Declined)     CM reviewed d/c planning process including the following: identifying help at home, patient preference for d/c planning needs, Discharge Lounge, [facility] Meds to Bed program, availability of treatment team to discuss questions or concerns patient and/or family may have regarding understanding medications and recognizing signs and symptoms once discharged  CM also encouraged patient to follow up with all recommended appointments after discharge  Patient advised of importance for patient and family to participate in managing patient’s medical well being  Information obtained from patient, lives with wife in 2 story home, [address], wife drives  Vaccinated for covid, has quad cane at home    Reports \" couple falls in the past 3 months, with no injury noted\"                                                                                                          "

## 2023-03-20 NOTE — OCCUPATIONAL THERAPY NOTE
"    Occupational Therapy Evaluation     Patient Name: Trevor Perez  LBRAV'G Date: 3/20/2023  Problem List  Principal Problem:    Brain mass  Active Problems:    Essential hypertension    CVA (cerebral vascular accident) ([facility] )    Brain compression ([facility] )    Cerebral edema ([facility] )    Past Medical History  Past Medical History:   Diagnosis Date    Hypertension     Prostate cancer ([facility] ) 2001    Rectal bleeding     Stroke [facility])     2011       Past Surgical History  Past Surgical History:   Procedure Laterality Date    COLONOSCOPY      PROSTATECTOMY  2001    TONSILLECTOMY             03/20/23 0845   OT Last Visit   OT Visit Date 03/20/23   Note Type   Note type Evaluation   Pain Assessment   Pain Assessment Tool 0-10   Pain Score No Pain   Restrictions/Precautions   Weight Bearing Precautions Per Order No   Other Precautions Fall Risk   Home Living   Type of [address] [place] [address] Multi-level;1/2 bath on main level;Bed/bath upstairs;Stairs to enter without rails  (4 to 5 ZACH)   Bathroom Shower/Tub Tub/shower unit   Bathroom Toilet Standard   Bathroom Equipment   (none per pt report)   [address]   Additional Comments pt reports no use of AD for functional mobility   Prior Function   Level of [place] Independent with ADLs; Independent with functional mobility; Independent with IADLS   Lives With Spouse   Receives Help From Family   IADLs Family/Friend/Other provides transportation; Independent with meal prep; Independent with medication management   Falls in the last 6 months 1 to 4   Vocational Retired  (artist)   Comments per pt, wife works in [employer] and is gone every other week so pt is home alone frequentl\y  Lifestyle   Autonomy PTA, pt living in [facility] with wife, (I) with ADLs, (I) with IADLs, (+) falls, (-) driving, and no use of AD for functional mobility     Reciprocal Relationships supportive wife   Service to Others retired   Subjective   Subjective \"I feel like an " "elephant when I walk\"   ADL   Eating Assistance 7  Independent   Grooming Assistance 5  Supervision/Setup   UB Bathing Assistance 5  Supervision/Setup   LB Bathing Assistance 4  Minimal Assistance   UB Dressing Assistance 5  Supervision/Setup   LB Dressing Assistance 4  [address]  4  Minimal Assistance   Bed Mobility   Supine to Sit 5  Supervision   Additional items Increased time required;HOB elevated   Additional Comments pt sat at EOB for 1-2 minutes   Transfers   Sit to Stand 4  Minimal assistance   Additional items Assist x 1; Increased time required;Verbal cues; Bedrails   Stand to Sit 4  Minimal assistance   Additional items Assist x 1; Armrests; Increased time required;Verbal cues   Additional Comments use of RW   Functional Mobility   Functional Mobility 4  Minimal assistance   Additional Comments Ax1, functional household distances, verbal cues of hand placement  pt required (A) with walker position due to L-sided weakness   Additional items Rolling walker   Balance   Static Sitting Fair   Dynamic Sitting Fair -   Static Standing Poor +   Dynamic Standing Poor +   Ambulatory Poor +   Activity Tolerance   Activity Tolerance Patient tolerated treatment well   Medical Staff Made Aware SPT Dian Mitchell, PT Tobin   Nurse Made Aware clearance per RN   RUE Assessment   RUE Assessment WFL   LUE Assessment   LUE Assessment WFL   Hand Function   Gross Motor Coordination Functional   Fine Motor Coordination Functional   Hand Function Comments pt is right-hand dominant   Cognition   Overall Cognitive Status WFL   Arousal/Participation Alert; Cooperative   Attention Within functional limits   Orientation Level Oriented X4   Memory Within functional limits   Following Commands Follows all commands and directions without difficulty   Comments pt pleasant and cooperative throughout session   Assessment   Limitation Decreased ADL status; Decreased Safe judgement during ADL;Decreased self-care " trans;Decreased high-level ADLs; Decreased endurance   Prognosis Good   Assessment Pt is a 69 y o  male seen for OT evaluation s/p admission [facility] on 3/20/2023 due to inability to walk, bend knees, weight gain, and decreased strength in LUE  Pt diagnosed with Brain mass  Per imaging results, Right frontal lobe cystic lesion with surrounding vasogenic edema most consistent with metastatic disease  Pt has a significant PMH impacting occupational performance including: HTN and CVA  PTA, pt living in [facility] with wife, (I) with ADLs, (I) with IADLs, (+) falls, (-) driving, and no use of AD for functional mobility  Pt is motivated to return to home  Personal and environmental factors supporting pt at time of IE include age  Personal and environmental factors inhibiting engagement in occupations include limited social support, inaccessible home environment, inaccessible bathroom environment, difficulty completing ADLs and difficulty completing IADLs  During evaluation pt performed as is outlined above in flowsheet  Pt required verbal cues for hand placement and walker position  Standardized assessments used to assist in identifying performance deficits include [facility] 6-Clicks  Performance deficits that affect the pt’s occupational performance during the initial evaluation include impaired balance, functional mobility, endurance, activity tolerance, forward functional reach, functional standing tolerance and overall strength  Based on pt’s functional performance and deficits the following occupations will be addressed in OT treatments in order to maximize pt’s independence and overall occupational performance: grooming, bathing/showering, toileting and toilet hygiene, dressing and functional mobility  Goals are listed below  Upon discharge from acute care setting recommend d/c to [address] [place] [address]     Goals   Patient Goals to go home   LTG Time Frame 10-14   Long Term Goal see goals listed below   Plan Treatment Interventions ADL retraining;Functional transfer training; Endurance training;Patient/family training;Equipment evaluation/education; Compensatory technique education;Continued evaluation; Energy conservation; Activityengagement   Goal Expiration Date 04/03/23   OT Frequency 2-3x/wk   Recommendation   OT Discharge Recommendation Post acute rehabilitation services   AM-PAC Daily Activity Inpatient   Lower Body Dressing 3   Bathing 3   Toileting 3   Upper Body Dressing 4   Grooming 4   Eating 4   Daily Activity Raw Score 21   Daily Activity Standardized Score (Calc for Raw Score >=11) 44 27   AM-PAC Applied Cognition Inpatient   Following a Speech/Presentation 4   Understanding Ordinary Conversation 4   Taking Medications 4   Remembering Where Things Are Placed or Put Away 4   Remembering List of 4-5 Errands 4   Taking Care of Complicated Tasks 4   Applied Cognition Raw Score 24   Applied Cognition Standardized Score 62 21   End of Consult   Patient Position at End of Consult Bedside chair; All needs within reach   Nurse Communication Nurse aware of consult     Pt will perform functional transfers from supine to EOB with mod (I) to improve participation in morning routine  Pt will complete functional mobility functional household distances with (S) to increase participation in daily routines  Pt will perform functional transfers to/from all surfaces with (S) to improve participation in daily routines  Pt will perform UB dressing with mod (I) to improve participation in morning routines  Pt will perform UB bathing with mod (I) to improve participation in ADLs  Pt will perform LB dressing with (S) and use of long-handled equipment as needed to improve participation in ADLs  Pt will perform LB bathing with (S) and use of long-handled equipment as needed to improve participation in morning routines  Pt will perform tolieting tasks with (S) to improve independence in ADL tasks  Pt will perform grooming tasks with mod (I) to increase participation in daily routines  Pt will stand at sink for 10 minutes with (S) to increase standing tolerance during ADL tasks  Pt will sit in bedside chair for all 3 meals to increase sitting/acitivty tolerance during ADL tasks  The patient's raw score on the AM-PAC Daily Activity Inpatient Short Form is 21  A raw score of greater than or equal to 19 suggests the patient may benefit from discharge to home  Please refer to the recommendation of the Occupational Therapist for safe discharge planning  This session, pt required and most appropriately benefited from skilled OT/PT co-eval due to decreased activity tolerance and unpredictable medical and/or functional status  OT and PT goals were addressed separately as seen in documentation       JACKIE Liriano

## 2023-03-20 NOTE — ASSESSMENT & PLAN NOTE
R frontal brain mass   - presented to the [place] ED on 3/19 with progressively worsening LUE and LLE weakness  - concern for possible metastatic disease given noted lung mass on imaging     Imaging:   · 3/19/2023 MRI brain w/wo: Rim-enhancing 2 4 cm centrally necrotic metastatic lesion in the right frontal lobe with surrounding severe vasogenic edema  Mass effect in the right hemisphere with approximately 2 4 mm right to left midline shift  Small focus of anterior cortical frontal acute to subacute ischemia  No evidence of acute intracranial hemorrhage    Plan:   · Continue to closely monitor neuro exam at this time   · frequent neuro checks per primary team   · Repeat STAT CTH with any acute decline in GCS > 2pts or more   · Maintain normotensive BP goals, SBP < 160   · No acute/emergent neuorsurgical intervention indicated at this time   · Imaging and case reviewed by nsgy team  · Ongoing discussion amongst nsgy team in regard to surgical plans    · Should decision for surgery be made, pt will need to undergo medical clearance for OR   · Pt needs further workup   · Continue ongoing evaluation of new brain mass/metastatic workup  · Continue keppra 500mg q 12hrs   · Recommend transitioning from decadron 10mg qd to 4mg q 6hrs   · Recommend holding home ASA given possible need for neurosurgical intervention   · DVT ppx: SCDs, lovenox   · Pain control per primary team   · PT/OT   · Continue medical management per primary team   · Consider heme/onc and rad/onc consults   · Social work following for assistance with dispo once medically stable/cleared     Neurosurgery will continue to closely follow  Please reach out with any further questions or concerns

## 2023-03-20 NOTE — ASSESSMENT & PLAN NOTE
Systolic (67MXA), APL:467 , Min:177 , Max:177     Initial 177/98 on transfer   Continue PTA medications amlodipine 10 mg and losartan 50 mg daily

## 2023-03-21 ENCOUNTER — APPOINTMENT (OUTPATIENT)
Dept: RADIOLOGY | Facility: HOSPITAL | Age: 68
End: 2023-03-21

## 2023-03-21 ENCOUNTER — APPOINTMENT (OUTPATIENT)
Dept: NON INVASIVE DIAGNOSTICS | Facility: HOSPITAL | Age: 68
End: 2023-03-21

## 2023-03-21 PROBLEM — R91.1 LUNG NODULE: Status: ACTIVE | Noted: 2023-03-21

## 2023-03-21 LAB
ANION GAP SERPL CALCULATED.3IONS-SCNC: 3 MMOL/L (ref 4–13)
AORTIC ROOT: 3.3 CM
AORTIC VALVE MEAN VELOCITY: 11.5 M/S
APICAL FOUR CHAMBER EJECTION FRACTION: 72 %
ASCENDING AORTA: 3.6 CM
AV LVOT MEAN GRADIENT: 4 MMHG
AV LVOT PEAK GRADIENT: 8 MMHG
AV MEAN GRADIENT: 6 MMHG
AV PEAK GRADIENT: 13 MMHG
AV VELOCITY RATIO: 0.79
BUN SERPL-MCNC: 14 MG/DL (ref 5–25)
CALCIUM SERPL-MCNC: 8.9 MG/DL (ref 8.3–10.1)
CHLORIDE SERPL-SCNC: 111 MMOL/L (ref 96–108)
CO2 SERPL-SCNC: 25 MMOL/L (ref 21–32)
CREAT SERPL-MCNC: 0.74 MG/DL (ref 0.6–1.3)
DOP CALC AO PEAK VEL: 1.78 M/S
DOP CALC AO VTI: 33.73 CM
DOP CALC LVOT PEAK VEL VTI: 28.9 CM
DOP CALC LVOT PEAK VEL: 1.41 M/S
E WAVE DECELERATION TIME: 188 MS
ERYTHROCYTE [DISTWIDTH] IN BLOOD BY AUTOMATED COUNT: 12.2 % (ref 11.6–15.1)
FRACTIONAL SHORTENING: 33 % (ref 28–44)
GFR SERPL CREATININE-BSD FRML MDRD: 94 ML/MIN/1.73SQ M
GLUCOSE P FAST SERPL-MCNC: 152 MG/DL (ref 65–99)
GLUCOSE SERPL-MCNC: 152 MG/DL (ref 65–140)
GLUCOSE SERPL-MCNC: 183 MG/DL (ref 65–140)
GLUCOSE SERPL-MCNC: 233 MG/DL (ref 65–140)
HCT VFR BLD AUTO: 39.7 % (ref 36.5–49.3)
HGB BLD-MCNC: 13.1 G/DL (ref 12–17)
INTERVENTRICULAR SEPTUM IN DIASTOLE (PARASTERNAL SHORT AXIS VIEW): 1.3 CM
INTERVENTRICULAR SEPTUM: 1.3 CM (ref 0.6–1.1)
LAAS-AP2: 24.3 CM2
LAAS-AP4: 21.6 CM2
LEFT ATRIUM SIZE: 3.9 CM
LEFT INTERNAL DIMENSION IN SYSTOLE: 3 CM (ref 2.1–4)
LEFT VENTRICULAR INTERNAL DIMENSION IN DIASTOLE: 4.5 CM (ref 3.5–6)
LEFT VENTRICULAR POSTERIOR WALL IN END DIASTOLE: 1.4 CM
LEFT VENTRICULAR STROKE VOLUME: 59 ML
LVSV (TEICH): 59 ML
MCH RBC QN AUTO: 29.6 PG (ref 26.8–34.3)
MCHC RBC AUTO-ENTMCNC: 33 G/DL (ref 31.4–37.4)
MCV RBC AUTO: 90 FL (ref 82–98)
MV E'TISSUE VEL-SEP: 7 CM/S
MV PEAK A VEL: 1.03 M/S
MV PEAK E VEL: 87 CM/S
MV STENOSIS PRESSURE HALF TIME: 55 MS
MV VALVE AREA P 1/2 METHOD: 4 CM2
PLATELET # BLD AUTO: 291 THOUSANDS/UL (ref 149–390)
PMV BLD AUTO: 9.6 FL (ref 8.9–12.7)
POTASSIUM SERPL-SCNC: 3.8 MMOL/L (ref 3.5–5.3)
RA PRESSURE ESTIMATED: 5 MMHG
RBC # BLD AUTO: 4.42 MILLION/UL (ref 3.88–5.62)
RIGHT ATRIUM AREA SYSTOLE A4C: 15.1 CM2
RIGHT VENTRICLE ID DIMENSION: 3.8 CM
RV PSP: 30 MMHG
SL CV LEFT ATRIUM LENGTH A2C: 5.6 CM
SL CV LV EF: 75
SL CV PED ECHO LEFT VENTRICLE DIASTOLIC VOLUME (MOD BIPLANE) 2D: 95 ML
SL CV PED ECHO LEFT VENTRICLE SYSTOLIC VOLUME (MOD BIPLANE) 2D: 36 ML
SODIUM SERPL-SCNC: 139 MMOL/L (ref 135–147)
TR MAX PG: 25 MMHG
TR PEAK VELOCITY: 2.5 M/S
TRICUSPID ANNULAR PLANE SYSTOLIC EXCURSION: 2.4 CM
TRICUSPID VALVE PEAK REGURGITATION VELOCITY: 2.52 M/S
WBC # BLD AUTO: 12.31 THOUSAND/UL (ref 4.31–10.16)

## 2023-03-21 RX ORDER — ATORVASTATIN CALCIUM 40 MG/1
40 TABLET, FILM COATED ORAL
Status: DISCONTINUED | OUTPATIENT
Start: 2023-03-21 | End: 2023-03-30 | Stop reason: HOSPADM

## 2023-03-21 RX ADMIN — LEVETIRACETAM 500 MG: 100 INJECTION, SOLUTION INTRAVENOUS at 21:05

## 2023-03-21 RX ADMIN — DEXAMETHASONE SODIUM PHOSPHATE 4 MG: 4 INJECTION INTRA-ARTICULAR; INTRALESIONAL; INTRAMUSCULAR; INTRAVENOUS; SOFT TISSUE at 13:11

## 2023-03-21 RX ADMIN — LEVETIRACETAM 500 MG: 100 INJECTION, SOLUTION INTRAVENOUS at 08:34

## 2023-03-21 RX ADMIN — DEXAMETHASONE SODIUM PHOSPHATE 4 MG: 4 INJECTION INTRA-ARTICULAR; INTRALESIONAL; INTRAMUSCULAR; INTRAVENOUS; SOFT TISSUE at 00:36

## 2023-03-21 RX ADMIN — LOSARTAN POTASSIUM 50 MG: 50 TABLET, FILM COATED ORAL at 08:29

## 2023-03-21 RX ADMIN — GADOBUTROL 8 ML: 604.72 INJECTION INTRAVENOUS at 19:56

## 2023-03-21 RX ADMIN — PANTOPRAZOLE SODIUM 40 MG: 40 TABLET, DELAYED RELEASE ORAL at 06:27

## 2023-03-21 RX ADMIN — ATORVASTATIN CALCIUM 40 MG: 40 TABLET, FILM COATED ORAL at 17:51

## 2023-03-21 RX ADMIN — DEXAMETHASONE SODIUM PHOSPHATE 4 MG: 4 INJECTION INTRA-ARTICULAR; INTRALESIONAL; INTRAMUSCULAR; INTRAVENOUS; SOFT TISSUE at 06:27

## 2023-03-21 RX ADMIN — AMLODIPINE BESYLATE 10 MG: 10 TABLET ORAL at 08:29

## 2023-03-21 RX ADMIN — ENOXAPARIN SODIUM 40 MG: 40 INJECTION SUBCUTANEOUS at 08:29

## 2023-03-21 RX ADMIN — DEXAMETHASONE SODIUM PHOSPHATE 4 MG: 4 INJECTION INTRA-ARTICULAR; INTRALESIONAL; INTRAMUSCULAR; INTRAVENOUS; SOFT TISSUE at 17:51

## 2023-03-21 NOTE — PLAN OF CARE
Problem: MOBILITY - ADULT  Goal: Maintain or return to baseline ADL function  Description: INTERVENTIONS:  -  Assess patient's ability to carry out ADLs; assess patient's baseline for ADL function and identify physical deficits which impact ability to perform ADLs (bathing, care of mouth/teeth, toileting, grooming, dressing, etc )  - Assess/evaluate cause of self-care deficits   - Assess range of motion  - Assess patient's mobility; develop plan if impaired  - Assess patient's need for assistive devices and provide as appropriate  - Encourage maximum independence but intervene and supervise when necessary  - Involve family in performance of ADLs  - Assess for home care needs following discharge   - Consider OT consult to assist with ADL evaluation and planning for discharge  - Provide patient education as appropriate  Outcome: Progressing  Goal: Maintains/Returns to pre admission functional level  Description: INTERVENTIONS:  - Perform BMAT or MOVE assessment daily    - Set and communicate daily mobility goal to care team and patient/family/caregiver  - Collaborate with rehabilitation services on mobility goals if consulted  - Perform Range of Motion 3 times a day  - Reposition patient every 2 hours    - Dangle patient 3 times a day  - Stand patient 3 times a day  - Ambulate patient 3 times a day  - Out of bed to chair 3 times a day   - Out of bed for meals 3 times a day  - Out of bed for toileting  - Record patient progress and toleration of activity level   Outcome: Progressing     Problem: PAIN - ADULT  Goal: Verbalizes/displays adequate comfort level or baseline comfort level  Description: Interventions:  - Encourage patient to monitor pain and request assistance  - Assess pain using appropriate pain scale  - Administer analgesics based on type and severity of pain and evaluate response  - Implement non-pharmacological measures as appropriate and evaluate response  - Consider cultural and social influences on pain and pain management  - Notify physician/advanced practitioner if interventions unsuccessful or patient reports new pain  Outcome: Progressing     Problem: INFECTION - ADULT  Goal: Absence or prevention of progression during hospitalization  Description: INTERVENTIONS:  - Assess and monitor for signs and symptoms of infection  - Monitor lab/diagnostic results  - Monitor all insertion sites, i e  indwelling lines, tubes, and drains  - Monitor endotracheal if appropriate and nasal secretions for changes in amount and color  - [place] appropriate cooling/warming therapies per order  - Administer medications as ordered  - Instruct and encourage patient and family to use good hand hygiene technique  - Identify and instruct in appropriate isolation precautions for identified infection/condition  Outcome: Progressing  Goal: Absence of fever/infection during neutropenic period  Description: INTERVENTIONS:  - Monitor WBC    Outcome: Progressing     Problem: SAFETY ADULT  Goal: Maintain or return to baseline ADL function  Description: INTERVENTIONS:  -  Assess patient's ability to carry out ADLs; assess patient's baseline for ADL function and identify physical deficits which impact ability to perform ADLs (bathing, care of mouth/teeth, toileting, grooming, dressing, etc )  - Assess/evaluate cause of self-care deficits   - Assess range of motion  - Assess patient's mobility; develop plan if impaired  - Assess patient's need for assistive devices and provide as appropriate  - Encourage maximum independence but intervene and supervise when necessary  - Involve family in performance of ADLs  - Assess for home care needs following discharge   - Consider OT consult to assist with ADL evaluation and planning for discharge  - Provide patient education as appropriate  Outcome: Progressing  Goal: Maintains/Returns to pre admission functional level  Description: INTERVENTIONS:  - Perform BMAT or MOVE assessment daily    - Set and communicate daily mobility goal to care team and patient/family/caregiver  - Collaborate with rehabilitation services on mobility goals if consulted  - Perform Range of Motion 3 times a day  - Reposition patient every 2 hours    - Dangle patient 3 times a day  - Stand patient 3 times a day  - Ambulate patient 3 times a day  - Out of bed to chair 3 times a day   - Out of bed for meals 3 times a day  - Out of bed for toileting  - Record patient progress and toleration of activity level   Outcome: Progressing  Goal: Patient will remain free of falls  Description: INTERVENTIONS:  - Educate patient/family on patient safety including physical limitations  - Instruct patient to call for assistance with activity   - Consult OT/PT to assist with strengthening/mobility   - Keep Call bell within reach  - Keep bed low and locked with side rails adjusted as appropriate  - Keep care items and personal belongings within reach  - Initiate and maintain comfort rounds  - Make Fall Risk Sign visible to staff  - Offer Toileting every 2 Hours, in advance of need  - Initiate/Maintain alarm  - Obtain necessary fall risk management equipment:   - Apply yellow socks and bracelet for high fall risk patients  - Consider moving patient to room near nurses station  Outcome: Progressing

## 2023-03-21 NOTE — PLAN OF CARE
Problem: MOBILITY - ADULT  Goal: Maintain or return to baseline ADL function  Description: INTERVENTIONS:  -  Assess patient's ability to carry out ADLs; assess patient's baseline for ADL function and identify physical deficits which impact ability to perform ADLs (bathing, care of mouth/teeth, toileting, grooming, dressing, etc )  - Assess/evaluate cause of self-care deficits   - Assess range of motion  - Assess patient's mobility; develop plan if impaired  - Assess patient's need for assistive devices and provide as appropriate  - Encourage maximum independence but intervene and supervise when necessary  - Involve family in performance of ADLs  - Assess for home care needs following discharge   - Consider OT consult to assist with ADL evaluation and planning for discharge  - Provide patient education as appropriate  3/21/2023 0422 by Daisy Crane RN  Outcome: Progressing  3/21/2023 0422 by Daisy Crane RN  Outcome: Progressing  Goal: Maintains/Returns to pre admission functional level  Description: INTERVENTIONS:  - Perform BMAT or MOVE assessment daily    - Set and communicate daily mobility goal to care team and patient/family/caregiver  - Collaborate with rehabilitation services on mobility goals if consulted  - Perform Range of Motion 3 times a day  - Reposition patient every 2 hours    - Dangle patient 3 times a day  - Stand patient 3 times a day  - Ambulate patient 3 times a day  - Out of bed to chair 3 times a day   - Out of bed for meals 3 times a day  - Out of bed for toileting  - Record patient progress and toleration of activity level   3/21/2023 0422 by Daisy Crane RN  Outcome: Progressing  3/21/2023 0422 by Daisy Crane RN  Outcome: Progressing     Problem: PAIN - ADULT  Goal: Verbalizes/displays adequate comfort level or baseline comfort level  Description: Interventions:  - Encourage patient to monitor pain and request assistance  - Assess pain using appropriate pain scale  - Administer analgesics based on type and severity of pain and evaluate response  - Implement non-pharmacological measures as appropriate and evaluate response  - Consider cultural and social influences on pain and pain management  - Notify physician/advanced practitioner if interventions unsuccessful or patient reports new pain  Outcome: Progressing     Problem: INFECTION - ADULT  Goal: Absence or prevention of progression during hospitalization  Description: INTERVENTIONS:  - Assess and monitor for signs and symptoms of infection  - Monitor lab/diagnostic results  - Monitor all insertion sites, i e  indwelling lines, tubes, and drains  - Monitor endotracheal if appropriate and nasal secretions for changes in amount and color  - Beasley appropriate cooling/warming therapies per order  - Administer medications as ordered  - Instruct and encourage patient and family to use good hand hygiene technique  - Identify and instruct in appropriate isolation precautions for identified infection/condition  Outcome: Progressing  Goal: Absence of fever/infection during neutropenic period  Description: INTERVENTIONS:  - Monitor WBC    Outcome: Progressing     Problem: SAFETY ADULT  Goal: Maintain or return to baseline ADL function  Description: INTERVENTIONS:  -  Assess patient's ability to carry out ADLs; assess patient's baseline for ADL function and identify physical deficits which impact ability to perform ADLs (bathing, care of mouth/teeth, toileting, grooming, dressing, etc )  - Assess/evaluate cause of self-care deficits   - Assess range of motion  - Assess patient's mobility; develop plan if impaired  - Assess patient's need for assistive devices and provide as appropriate  - Encourage maximum independence but intervene and supervise when necessary  - Involve family in performance of ADLs  - Assess for home care needs following discharge   - Consider OT consult to assist with ADL evaluation and planning for discharge  - Provide patient education as appropriate  3/21/2023 0422 by Sarita Dhillon RN  Outcome: Progressing  3/21/2023 0422 by Sarita Dhillon RN  Outcome: Progressing  Goal: Maintains/Returns to pre admission functional level  Description: INTERVENTIONS:  - Perform BMAT or MOVE assessment daily    - Set and communicate daily mobility goal to care team and patient/family/caregiver  - Collaborate with rehabilitation services on mobility goals if consulted  - Perform Range of Motion 3 times a day  - Reposition patient every 2 hours    - Dangle patient 3 times a day  - Stand patient 3 times a day  - Ambulate patient 3 times a day  - Out of bed to chair 3 times a day   - Out of bed for meals 3 times a day  - Out of bed for toileting  - Record patient progress and toleration of activity level   3/21/2023 0422 by Sarita Dhillon RN  Outcome: Progressing  3/21/2023 0422 by Sarita Dhillon RN  Outcome: Progressing  Goal: Patient will remain free of falls  Description: INTERVENTIONS:  - Educate patient/family on patient safety including physical limitations  - Instruct patient to call for assistance with activity   - Consult OT/PT to assist with strengthening/mobility   - Keep Call bell within reach  - Keep bed low and locked with side rails adjusted as appropriate  - Keep care items and personal belongings within reach  - Initiate and maintain comfort rounds  - Make Fall Risk Sign visible to staff  - Offer Toileting every 2 Hours, in advance of need  - Initiate/Maintain bed alarm  - Obtain necessary fall risk management equipment:   - Apply yellow socks and bracelet for high fall risk patients  - Consider moving patient to room near nurses station  Outcome: Progressing

## 2023-03-21 NOTE — UTILIZATION REVIEW
"Initial Clinical Review    Observation 3/20 @ 1691 and changed to Inpatient on 3/21 @ 1428  Pt requiring continued stay for Brain mass/ Lung nodule and Workup and Tentative OR    Admission: Date/Time/Statement:   Admission Orders (From admission, onward)     Ordered        03/21/23 1428  Inpatient Admission  Once            03/20/23 0052  Place in Observation  Once                      Orders Placed This Encounter   Procedures   • Inpatient Admission     Standing Status:   Standing     Number of Occurrences:   1     Order Specific Question:   Level of Care     Answer:   Med Surg [16]     Order Specific Question:   Estimated length of stay     Answer:   Not Applicable     ED Arrival Information     Patient not seen in ED                       Initial Presentation: 69 y o  male, transfer from [address] [place] [address] ED on 3/19 for new brain mass concerning for metastasis  Pt reports L sided weakness upper and lower progressing over the past week, feels it is worsening  Reports not being able to walk well/bend knees and decreased strength in LUE  Hx of stroke 11-12 years ago with mild residual L sided deficits, reports baseline is no weakness but some unusual sensation in fingers and toes  Transfer from Neurosurgery evaluation  PMH for CVA with mild residual L sided deficit and HTN  Admit Observation level of care for Brain mass  Continue Iv decadron and start Iv Keppra  Neurology and Neurosurgery consult  CT chest/abd/pelvis  CTA head and neck 3/19/23 \"Right frontal lobe cystic lesion with surrounding vasogenic edema most consistent with metastatic disease given findings described below  Local mass effect on the right lateral ventricle without significant midline shift  Right upper lobe mass with partially imaged right hilar adenopathy  \"  MRI brain w wo contrast 3/19/23 \"Rim-enhancing 2 4 cm centrally necrotic metastatic lesion in the right frontal lobe with surrounding severe vasogenic edema   Mass effect in the right " "hemisphere with approximately 2 4 mm right to left midline shift  Small focus of anterior cortical frontal acute to subacute ischemia  No evidence of acute intracranial hemorrhage  \"    3/20   Neurology cons; Found to have a right parasagittal frontal mass with surrounding vasogenic edema and a single punctate foci of ischemia within or immediately adjacent to the above-mentioned edema  Statistically likely etiology of above-mentioned mass is metastatic disease  Continue antiplatelet for now  Continue Iv Keppra and Iv Decadron  Recommend Neurosurgery and Oncology consult  Strongly consider empiric anticoagulation given the above-mentioned suspicion for embolic phenomenon in this clinical context  Otherwise no additional neurologic recommendations other than those outlined in note  Neurosurgery cons; R frontal brain mass  Frequent neuro checks  No acute/emergent neurosurgical intervention indicated at this time  Continue Keppra q12h  Recommend transitioning from decadron 10mg qd to 4mg q 6hrs   Recommend holding home ASA given possible need for neurosurgical intervention  Tentative plan for OR on Thursday 3/23      3/21 Changed to Inpatient level of care   Progress notes; CTA head and neck initially done on 3/19/2023 incidentally showed RUL mass with partially imaged right hilar adenopathy  CT chest/abd/pelvis with contrast 3/20/2023 shows \"3 8 x 3 5 cm right upper lobe lung mass with associated overlying pleural tag and adjacent to pleural thickening, this may be due to pleural reaction or mild pleural invasion  No contralateral lung nodules or mass  Right hilar lymphadenopathy   Small lower right paratracheal left paratracheal lymph node do not meet the criteria for pathologic enlargement on the bases of size or morphology  There is no CT evidence of for metastatic disease in the abdomen and pelvis  No lytic destructive lesion in the visualized bony skeleton  \" which is concerning for a primary lung tumor given " recently diagnosed brain mass concerning for metastasis on MRI brain  Pulmonology and Hem/Onc consult  Plan for Or later this week for resection of brain mass  Will need medical clearance  Recommended hold ASA  Changed decadron 10 mg to 4 mg q6h  Will repeat MRI brain w/ DTI  Will start 40 mg daily given CVA history  Tele monitoring  Pulmonology cons; Right upper lobe lung mass with likely carcinoma with metastasis  Hilar and subcarinal lymphadenopathy  Right frontal brain mass  Surgery is planning for resection of brain mass, which can provide pathology and staging  At this time would defer biopsy of right lung mass and less brain mass were to come back negative  Oncology-Medical cons; Lung mass and Brain mass  Pt was found to have mass in the brain on the right side with edema and also mass in right upper lung 3 8 x 3 5 cm with adjacent pleural thickening and right hilar lymphadenopathy and has small lower right paratracheal lymph node  Patient had MRI scan of the brain and CT scans of chest abdomen pelvis  Since admission patient's symptoms are improving probably because of Decadron  He has much less weakness in left arm  Also there is some improvement in weakness in left leg  Sister had breast cancer         Vitals   Temperature Pulse Respirations Blood Pressure SpO2   03/20/23 0022 03/20/23 0022 03/20/23 0022 03/20/23 0022 03/20/23 0022   98 3 °F (36 8 °C) 87 18 (!) 177/98 96 %      Temp src Heart Rate Source Patient Position - Orthostatic VS BP Location FiO2 (%)   -- -- -- -- --             Pain Score       03/20/23 0030       No Pain          Wt Readings from Last 1 Encounters:   03/21/23 82 6 kg (182 lb)     Additional Vital Signs:   03/21/23 08:22:26 97 6 °F (36 4 °C) -- 16 165/108   Abnormal  127 -- --   03/20/23 21:49:30 97 7 °F (36 5 °C) -- 17 151/83 106 -- --   03/20/23 14:16:10 98 6 °F (37 °C) -- 18 -- -- -- --   03/20/23 0800 -- -- -- -- -- -- None (Room air)   03/20/23 07:40:55 98 1 °F (36 7 °C) 75 18 149/86 107 97 % --   03/20/23 06:00:18 -- 73 -- 147/86 106 95 % --     Pertinent Labs/Diagnostic Test Results:   CT chest abdomen pelvis w contrast (3/20) -   (Results Pending)     MRI  Brain (3/20) -  (Results Pending)         Results from last 7 days   Lab Units 03/21/23  0628 03/20/23  0136 03/19/23  1148   WBC Thousand/uL 12 31*  --  8 32   HEMOGLOBIN g/dL 13 1  --  14 8   HEMATOCRIT % 39 7  --  44 5   PLATELETS Thousands/uL 291 287 316         Results from last 7 days   Lab Units 03/21/23  0628 03/19/23  1148   SODIUM mmol/L 139 140   POTASSIUM mmol/L 3 8 3 8   CHLORIDE mmol/L 111* 105   CO2 mmol/L 25 26   ANION GAP mmol/L 3* 9   BUN mg/dL 14 15   CREATININE mg/dL 0 74 0 91   EGFR ml/min/1 73sq m 94 85   CALCIUM mg/dL 8 9 9 6         Results from last 7 days   Lab Units 03/21/23  1104 03/20/23  2050 03/20/23  1629 03/20/23  1212 03/19/23  1204   POC GLUCOSE mg/dl 233* 167* 146* 98 88     Results from last 7 days   Lab Units 03/21/23  0628 03/19/23  1148   GLUCOSE RANDOM mg/dL 152* 103         Results from last 7 days   Lab Units 03/20/23  0136   HEMOGLOBIN A1C % 6 0*   EAG mg/dl 126       Results from last 7 days   Lab Units 03/19/23  1345 03/19/23  1148   HS TNI 0HR ng/L  --  6   HS TNI 2HR ng/L 6  --    HSTNI D2 ng/L 0  --          Results from last 7 days   Lab Units 03/19/23  1148   PROTIME seconds 12 8   INR  0 96   PTT seconds 27     Results from last 7 days   Lab Units 03/19/23  1148   TSH 3RD GENERATON uIU/mL 2 173       Results from last 7 days   Lab Units 03/19/23  1148   BNP pg/mL 29         Past Medical History:   Diagnosis Date   • Hypertension    • Prostate cancer ([facility] [address] ) 2001   • Rectal bleeding    • Stroke [facility])     2011       Present on Admission:  • CVA (cerebral vascular accident) [facility])  • Essential hypertension  • Cerebral edema ([facility] [address] )      Admitting Diagnosis: brain mass  Age/Sex: 69 y o  male     Admission Orders:  Scheduled Medications:  amLODIPine, 10 mg, Oral, Daily  atorvastatin, 40 mg, Oral, After Dinner  dexamethasone, 4 mg, Intravenous, Q6H LEN  enoxaparin, 40 mg, Subcutaneous, Daily  levETIRAcetam, 500 mg, Intravenous, Q12H LEN  losartan, 50 mg, Oral, Daily  pantoprazole, 40 mg, Oral, Early Morning      Continuous IV Infusions:    sodium chloride 0 9 % infusion  Rate: 75 mL/hr Dose: 75 mL/hr  Freq: Continuous Route: IV  Last Dose: 75 mL/hr (03/20/23 1304)  Start: 03/20/23 1230 End: 03/20/23 1703      PRN Meds: None       IP CONSULT TO NEUROSURGERY  IP CONSULT TO NEUROLOGY  IP CONSULT TO PULMONOLOGY  IP CONSULT TO ONCOLOGY    Network Utilization Review Department  ATTENTION: Please call with any questions or concerns to [phone] and carefully listen to the prompts so that you are directed to the right person  All voicemails are confidential   Macie Glaser all requests for admission clinical reviews, approved or denied determinations and any other requests to dedicated fax number below belonging to the campus where the patient is receiving treatment   List of dedicated fax numbers for the Facilities:  [address] DENIALS (Administrative/Medical Necessity) [phone]   [address] (Maternity/NICU/Pediatrics) [phone]   8 Geraldine Dvais [phone]   [facility] [place]  [phone]   [address] [address] [facility] [phone]   1556 [facility] Yoel Reich [facility] 134 815 [facility] [phone]

## 2023-03-21 NOTE — ASSESSMENT & PLAN NOTE
Hx CVA 11-12 years ago with mild residual L sided deficits, baseline fully functional motor  - will hold aspirin 81 mg for 2 weeks per neurosurgery recommendations

## 2023-03-21 NOTE — ASSESSMENT & PLAN NOTE
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, molecular study pending   - pt reports remote hx of prostate CA s/p prostatectomy several yrs ago and no further intervention or f/u since  - Neuro exam w/o focal deficit, LLE strenght 4 5/5, subjectively felt stronger than day before     Imaging:  - MRI brain 03/19: Rim-enhancing 2 4 cm centrally necrotic metastatic lesion in the right frontal lobe with surrounding severe vasogenic edema  Mass effect in the right hemisphere with approximately 2 4 mm right to left midline shift  - MRI brain 3/23: Expected postsurgical change from right parietal craniotomy and resection of cystic mass in the frontoparietal region  Stable vasogenic edema surrounding the resection cavity   Right to left midline shift of 2 mm    Plan:   Medically stable for discharge at this time to [facility]   Discharge on keppra and decadron taper  F/u OP with neuro, NSGY, heme onc     · Continue to closely monitor neuro exam at this time   · frequent neuro checks q6hr   · Repeat STAT CTH with any acute decline in GCS > 2pts or more   · Maintain normotensive BP goals, SBP < 160   · Continue keppra 500mg q 12hrs x 1 week for seizure ppx  · Continue decadron wean as ordered q 48hours  · Hold all therapeutic doses of AC / AP therapy, continue to hold asa for at least 2 week  · DVT ppx: SCDs, on Lovenox

## 2023-03-21 NOTE — ASSESSMENT & PLAN NOTE
Systolic (76XTC), LRP:820 , Min:133 , Max:169     Initial 177/98 on transfer   Continue PTA medications amlodipine 10 mg and losartan 50 mg daily, antihypertensive med given on day of surgery     PRN Hydralazine

## 2023-03-21 NOTE — CONSULTS
Consult Note - Pulmonary   Ena Nath 69 y o  male MRN: [id number]  Unit/Bed#: [facility] 625-01 Encounter: [id number]      Reason for consultation: Right upper lobe lung mass    Requesting physician: Dr Michael Fitzgerald & Recommendations:   Right upper lobe lung mass with likely carcinoma with metastasis  Hilar and subcarinal lymphadenopathy  Right frontal brain mass  CVA  History of tobacco abuse  Marijuana use    Imaging was reviewed and discussed with oncology and neurosurgery  Surgery is planning for resection of brain mass, which can provide pathology and staging  At this time would defer biopsy of right lung mass and less brain mass were to come back negative  Discussed at length with patient and patient's family along with primary team       History of Present Illness   HPI:  Ena Nath is a 69 y o  male with past medical history of CVA who presented initially for left sided weakness  Upon work-up for stroke, patient was noted to have brain mass and right upper lung mass  Patient and family are anxious for diagnosis  They say that neurosurgery may be planning for resection  He currently feels in his usual state of health, with left-sided weakness improving  He denies any recent weight loss, loss of appetite, night sweats, fatigue  He does have a smoking history  Review of systems:  12 point review of systems was completed and was otherwise negative except as listed in HPI  Review of Systems   Constitutional: Negative for activity change, chills, diaphoresis, fatigue and fever  HENT: Negative for congestion, postnasal drip, rhinorrhea, sinus pressure, sinus pain and sore throat  Eyes: Negative  Respiratory: Negative for cough, chest tightness and shortness of breath  Cardiovascular: Negative for chest pain, palpitations and leg swelling  Gastrointestinal: Negative for abdominal distention, abdominal pain, constipation, diarrhea, nausea and vomiting  Endocrine: Negative  Genitourinary: Negative  Musculoskeletal: Negative for back pain, gait problem and neck pain  Skin: Negative  Allergic/Immunologic: Negative  Neurological: Positive for weakness  Negative for dizziness, syncope, light-headedness and headaches  Hematological: Negative  Psychiatric/Behavioral: Negative for confusion and decreased concentration  The patient is nervous/anxious  All other systems reviewed and are negative  Historical Information   Past Medical History:   Diagnosis Date   • Hypertension    • Prostate cancer ([facility] [address] )    • Rectal bleeding    • Stroke [facility])            Past Surgical History:   Procedure Laterality Date   • COLONOSCOPY     • PROSTATECTOMY     • TONSILLECTOMY       Family History   Problem Relation Age of Onset   • Dementia Mother            • Breast cancer Sister            • Prostate cancer Brother         Received Radiation       Occupational History: Retired     Social History:   Alcohol: None  Smokin-pack-year history, quit in   Illicit drugs: Marijuana    Meds/Allergies   Current Facility-Administered Medications   Medication Dose Route Frequency   • amLODIPine (NORVASC) tablet 10 mg  10 mg Oral Daily   • atorvastatin (LIPITOR) tablet 40 mg  40 mg Oral After Dinner   • dexamethasone (DECADRON) injection 4 mg  4 mg Intravenous Q6H [address]   • enoxaparin (LOVENOX) subcutaneous injection 40 mg  40 mg Subcutaneous Daily   • levETIRAcetam (KEPPRA) 500 mg in sodium chloride 0 9 % 100 mL IVPB  500 mg Intravenous Q12H [address]   • losartan (COZAAR) tablet 50 mg  50 mg Oral Daily   • pantoprazole (PROTONIX) EC tablet 40 mg  40 mg Oral Early Morning     Medications Prior to Admission   Medication   • amLODIPine (NORVASC) 10 mg tablet   • aspirin (ECOTRIN LOW STRENGTH) 81 mg EC tablet   • losartan (COZAAR) 50 mg tablet     No Known Allergies    Vitals: Blood pressure (!) 165/108, pulse 75, temperature 97 6 °F (36 4 °C), resp   rate "16, height 5' 9\" (1 753 m), weight 82 6 kg (182 lb), SpO2 97 %  ,  Room air, Body mass index is 26 88 kg/m²  Intake/Output Summary (Last 24 hours) at 3/21/2023 1121  Last data filed at 3/20/2023 1416  Gross per 24 hour   Intake 360 ml   Output --   Net 360 ml       Physical Exam  General: Well-appearing, Awake alert and oriented x 3, conversant without conversational dyspnea, NAD, normal affect  HEENT:  PERRL, Sclera noninjected, nonicteric OU, Nares patent, no nasal flaring, no nasal drainage, Mucous membranes, moist, no oral lesions, normal dentition  NECK: Trachea midline, no accessory muscle use, no stridor, no cervical or supraclavicular adenopathy, JVP not elevated  CARDIAC: Reg, single s1/S2, no m/r/g  PULM: Clear to auscultation bilaterally  CHEST: No gross deformities, equal chest expansion on inspiration bilaterally  ABD: Normoactive bowel sounds, soft nontender, nondistended, no rebound, no rigidity, no guarding  : No Mcclelland  EXT: No cyanosis, no clubbing, no edema, normal capillary refill  SKIN:  No rashes, no lesions  NEURO: no focal neurologic deficits, AAOx3, moving all extremities appropriately    Labs: I have personally reviewed pertinent lab results    Laboratory and Diagnostics  Results from last 7 days   Lab Units 03/21/23  0628 03/20/23  0136 03/19/23  1148   WBC Thousand/uL 12 31*  --  8 32   HEMOGLOBIN g/dL 13 1  --  14 8   HEMATOCRIT % 39 7  --  44 5   PLATELETS Thousands/uL 291 287 316     Results from last 7 days   Lab Units 03/21/23  0628 03/19/23  1148   SODIUM mmol/L 139 140   POTASSIUM mmol/L 3 8 3 8   CHLORIDE mmol/L 111* 105   CO2 mmol/L 25 26   ANION GAP mmol/L 3* 9   BUN mg/dL 14 15   CREATININE mg/dL 0 74 0 91   CALCIUM mg/dL 8 9 9 6   GLUCOSE RANDOM mg/dL 152* 103          Results from last 7 days   Lab Units 03/19/23  1148   INR  0 96   PTT seconds 27                                    ABG:       Imaging and other studies: I have personally reviewed pertinent films in " PACS  CT chest 3/20/2023: Right upper lobe lung mass measuring 3 8 x 3 5 cm, lower right paratracheal lymph node, subcarinal lymph nodes and enlarged right hilar lymph node seen  Chest x-ray 3/19/2023: Right upper lobe lung mass  Pulmonary function testing: None available    EKG, Pathology, and Other Studies: I have personally reviewed pertinent reports      Echocardiogram 3/21/2023: EF of 75% with grade 1 diastolic dysfunction    Code Status: Level 1 - Full Code    Philipp Grider MD  Pulmonary/Critical Care Fellow PGY-IV  [facility]

## 2023-03-21 NOTE — ASSESSMENT & PLAN NOTE
"Has a 20 pk-year smoking history, quit 30 years ago  Pt has no previous hx of lung cancer screening and has no pulm symptoms  CTA head and neck initially done on 3/19/2023 incidentally showed RUL mass with partially imaged right hilar adenopathy  CT chest/abd/pelvis with contrast 3/20/2023 shows \"3 8 x 3 5 cm right upper lobe lung mass with associated overlying pleural tag and adjacent to pleural thickening, this may be due to pleural reaction or mild pleural invasion  No contralateral lung nodules or mass  Right hilar lymphadenopathy  Small lower right paratracheal left paratracheal lymph node do not meet the criteria for pathologic enlargement on the bases of size or morphology  There is no CT evidence of for metastatic disease in the abdomen and pelvis  No lytic destructive lesion in the visualized bony skeleton  \" which is concerning for a primary lung tumor given recently diagnosed brain mass concerning for metastasis on MRI brain  Plan:   -Consulted pulmonology  -Consulted hem/onc for management of metastatic malignancy: Recommend full body bone scan which was completed on 3/22 and showed no metastatic disease  Patient is recommended to follow-up with heme-onc 2 weeks after discharge, possible PET-CT; they will continue following and waiting for biopsy results        "

## 2023-03-21 NOTE — PROGRESS NOTES
"      Progress Notes - Family Medicine Residency, Shana Lagos 1955, 69 y o  male  MRN: [id number]    Unit/Bed#: [facility] 625-01 Encounter: [id number]  Primary Care Provider: Char Carlson DO      Admission Date: 3/20/2023 0013  Length of Stay: 1 days  Code Status:  Level 1 - Full Code  Consult:   IP CONSULT TO NEUROSURGERY  IP CONSULT TO NEUROLOGY  IP CONSULT TO PULMONOLOGY  IP CONSULT TO ONCOLOGY      * Brain mass  Assessment & Plan  Presenting with 1 week progressive L sided weakness upper and lower extremities   CTA head and neck 3/19/23 \"Right frontal lobe cystic lesion with surrounding vasogenic edema most consistent with metastatic disease given findings described below  Local mass effect on the right lateral ventricle without significant midline shift  Right upper lobe mass with partially imaged right hilar adenopathy  \"  MRI brain w wo contrast 3/19/23 \"Rim-enhancing 2 4 cm centrally necrotic metastatic lesion in the right frontal lobe with surrounding severe vasogenic edema  Mass effect in the right hemisphere with approximately 2 4 mm right to left midline shift  Small focus of anterior cortical frontal acute to subacute ischemia  No evidence of acute intracranial hemorrhage  \"  Given findings, transfer from [place] after discussion with neurocritical and NSGY for biopsy   Pt had leukocytosis on 03/21 of 12 31 likely 2/2 to steroids  Plan:   - continue decadron 4mg q6h, keppra 500 mg q12h, Protonix 40 mg for ulcer ppx  - neurology, pulm, hem/onc and NSGY consulted, appreciate recommendations:  - Per Eulah Backers will plan for OR later this week for resection of brain mass  Will need medical clearance  Recommended holding ASA  Changed decadron 10 mg to 4 mg q6h  Will repeat MRI brain w/ DTI  - Per neuro, will start atorvastatin 40 mg daily in the evening given CVA hx  Will f/u on echo for concerns of embolic stroke on MRI brain  Will monitor pt on telemetry       - counseled patients on risks vs " "benefits of holding aspirin  Pt in agreement with holding ASA for possible biopsy/resection of mass later this week  - hem/onc consulted for management of metastatic malignancy, appreciate recommendations   - Will f/u with morning CBC w/ diff  - Will monitor fingerstick glucose   - PT/OT eval when appropriate  Will likely need SNF placement on dc     Lung nodule  Assessment & Plan  Has a 20 pk-year smoking history, quit 30 years ago  Pt has no previous hx of lung cancer screening and has no pulm symptoms  CTA head and neck initially done on 3/19/2023 incidentally showed RUL mass with partially imaged right hilar adenopathy  CT chest/abd/pelvis with contrast 3/20/2023 shows \"3 8 x 3 5 cm right upper lobe lung mass with associated overlying pleural tag and adjacent to pleural thickening, this may be due to pleural reaction or mild pleural invasion  No contralateral lung nodules or mass  Right hilar lymphadenopathy  Small lower right paratracheal left paratracheal lymph node do not meet the criteria for pathologic enlargement on the bases of size or morphology  There is no CT evidence of for metastatic disease in the abdomen and pelvis  No lytic destructive lesion in the visualized bony skeleton  \" which is concerning for a primary lung tumor given recently diagnosed brain mass concerning for metastasis on MRI brain  Given findings,   -Consulted pulmonology, appreciate recommendations   -Consulted hem/onc for management of metastatic malignancy, appreciate recommendations       CVA (cerebral vascular accident) ([facility] [address] )  Assessment & Plan  Hx CVA 11-12 years ago with mild residual L sided deficits, baseline fully functional motor  - will hold aspirin 81 mg for possible OR last this week  Essential hypertension  Assessment & Plan  Systolic (65ZQP), SKF:145 , Min:151 , Max:165     Initial 177/98 on transfer   Continue PTA medications amlodipine 10 mg and losartan 50 mg daily          VTE Pharmacologic Prophylaxis: " "Enoxaparin (Lovenox)  VTE Mechanical Prophylaxis: on SCD  Diet: Regular house  Code Status: Level 1-full code  Disposition: pending evaluation from [address] [place] [address], neurology, pulmonology  Continue inpatient management  Discussed with attending physician, Charly Marinelli, and [facility] team       Subjective:   No overnight events  Patient reports feeling stronger on his L side this morning  He reports being steady on his feet and able to walk to the bathroom without assistance  Patient endorses being overwhelmed by his diagnosis but has emotional support from his wife  ROS is negative  Objective:     Vitals: Blood pressure (!) 165/108, pulse 75, temperature 97 6 °F (36 4 °C), resp  rate 16, height 5' 9\" (1 753 m), weight 82 6 kg (182 lb), SpO2 97 %  ,Body mass index is 26 88 kg/m²  Intake/Output Summary (Last 24 hours) at 3/21/2023 1126  Last data filed at 3/20/2023 1416  Gross per 24 hour   Intake 360 ml   Output --   Net 360 ml       Physical Exam  Vitals reviewed  Constitutional:       Appearance: Normal appearance  HENT:      Mouth/Throat:      Mouth: Mucous membranes are moist       Pharynx: Oropharynx is clear  Eyes:      Extraocular Movements: Extraocular movements intact  Conjunctiva/sclera: Conjunctivae normal       Pupils: Pupils are equal, round, and reactive to light  Cardiovascular:      Rate and Rhythm: Normal rate and regular rhythm  Heart sounds: No murmur heard  Pulmonary:      Effort: Pulmonary effort is normal  No respiratory distress  Breath sounds: Normal breath sounds  No wheezing  Musculoskeletal:         General: No swelling  Normal range of motion  Cervical back: Normal range of motion and neck supple  Skin:     General: Skin is warm and dry  Neurological:      General: No focal deficit present  Mental Status: He is alert and oriented to person, place, and time  Cranial Nerves: No cranial nerve deficit  Motor: No weakness (5/5 strength of R UE/LE   " 5/5 strength of L UE/LE)  Comments: Sensation is intact to light touch of face, UE, and LE bilaterally  Psychiatric:         Mood and Affect: Mood normal          Behavior: Behavior normal          Invasive Devices     Peripheral Intravenous Line  Duration           Peripheral IV 03/19/23 Proximal;Right;Ventral (anterior) Forearm 1 day                           Lab and other studies:  I have personally reviewed pertinent reports  Admission on 03/20/2023   Component Date Value   • Platelets 82/23/5038 287    • MPV 03/20/2023 9 4    • Cholesterol 03/19/2023 147    • Triglycerides 03/19/2023 64    • HDL, Direct 03/19/2023 43    • LDL Calculated 03/19/2023 91    • Hemoglobin A1C 03/20/2023 6 0 (H)    • EAG 03/20/2023 126    • TSH 3RD GENERATON 03/19/2023 2  173    • POC Glucose 03/20/2023 98    • POC Glucose 03/20/2023 146 (H)    • POC Glucose 03/20/2023 167 (H)    • WBC 03/21/2023 12 31 (H)    • RBC 03/21/2023 4 42    • Hemoglobin 03/21/2023 13 1    • Hematocrit 03/21/2023 39 7    • MCV 03/21/2023 90    • MCH 03/21/2023 29 6    • MCHC 03/21/2023 33 0    • RDW 03/21/2023 12 2    • Platelets 04/44/2843 291    • MPV 03/21/2023 9 6    • Sodium 03/21/2023 139    • Potassium 03/21/2023 3 8    • Chloride 03/21/2023 111 (H)    • CO2 03/21/2023 25    • ANION GAP 03/21/2023 3 (L)    • BUN 03/21/2023 14    • Creatinine 03/21/2023 0 74    • Glucose 03/21/2023 152 (H)    • Glucose, Fasting 03/21/2023 152 (H)    • Calcium 03/21/2023 8 9    • eGFR 03/21/2023 94    • POC Glucose 03/21/2023 233 (H)        Recent Results (from the past 24 hour(s))   Fingerstick Glucose (POCT)    Collection Time: 03/20/23 12:12 PM   Result Value Ref Range    POC Glucose 98 65 - 140 mg/dl   Fingerstick Glucose (POCT)    Collection Time: 03/20/23  4:29 PM   Result Value Ref Range    POC Glucose 146 (H) 65 - 140 mg/dl   Fingerstick Glucose (POCT)    Collection Time: 03/20/23  8:50 PM   Result Value Ref Range    POC Glucose 167 (H) 65 - 140 mg/dl CBC and Platelet    Collection Time: 03/21/23  6:28 AM   Result Value Ref Range    WBC 12 31 (H) 4 31 - 10 16 Thousand/uL    RBC 4 42 3 88 - 5 62 Million/uL    Hemoglobin 13 1 12 0 - 17 0 g/dL    Hematocrit 39 7 36 5 - 49 3 %    MCV 90 82 - 98 fL    MCH 29 6 26 8 - 34 3 pg    MCHC 33 0 31 4 - 37 4 g/dL    RDW 12 2 11 6 - 15 1 %    Platelets 697 612 - 096 Thousands/uL    MPV 9 6 8 9 - 12 7 fL   Basic metabolic panel    Collection Time: 03/21/23  6:28 AM   Result Value Ref Range    Sodium 139 135 - 147 mmol/L    Potassium 3 8 3 5 - 5 3 mmol/L    Chloride 111 (H) 96 - 108 mmol/L    CO2 25 21 - 32 mmol/L    ANION GAP 3 (L) 4 - 13 mmol/L    BUN 14 5 - 25 mg/dL    Creatinine 0 74 0 60 - 1 30 mg/dL    Glucose 152 (H) 65 - 140 mg/dL    Glucose, Fasting 152 (H) 65 - 99 mg/dL    Calcium 8 9 8 3 - 10 1 mg/dL    eGFR 94 ml/min/1 73sq m   Fingerstick Glucose (POCT)    Collection Time: 03/21/23 11:04 AM   Result Value Ref Range    POC Glucose 233 (H) 65 - 140 mg/dl         Imaging:    CT chest/AB/Pelvis with contrast    Result Date: 3/21/2023  3 8 x 3 5 cm right upper lobe lung mass with associated overlying pleural tag and adjacent to pleural thickening, this may be due to pleural reaction or mild pleural invasion  No contralateral lung nodules or mass  Right hilar lymphadenopathy  Small lower right paratracheal left paratracheal lymph node do not meet the criteria for pathologic enlargement on the bases of size or morphology  There is no CT evidence of for metastatic disease in the abdomen and pelvis  No lytic destructive lesion in the visualized bony skeleton  The study was marked in [facility] for significant notification     Workstation performed: [id number]    CTA head and neck with and without contrast    Result Date: 3/19/2023  Right frontal lobe cystic lesion with surrounding vasogenic edema most consistent with metastatic disease given findings described below    Local mass effect on the right lateral ventricle without "significant midline shift  Contrast-enhanced MRI of the brain recommended for further evaluation  Right upper lobe mass with partially imaged right hilar adenopathy  Dedicated CT of the chest, abdomen and pelvis is recommended for further evaluation  No evidence for high-grade stenosis, major branch vessel occlusion or vascular aneurysm of the cervical or intracranial vessels  I personally discussed this study with ESAU FANG on 3/19/2023 at 2:08 PM  Workstation performed: [id number]     XR chest 1 view portable    Result Date: 3/20/2023  No acute cardiopulmonary disease  Right upper lung mass  Workstation performed: [id number]     MRI brain w wo contrast    Result Date: 3/19/2023  Rim-enhancing 2 4 cm centrally necrotic metastatic lesion in the right frontal lobe with surrounding severe vasogenic edema  Mass effect in the right hemisphere with approximately 2 4 mm right to left midline shift  Small focus of anterior cortical frontal acute to subacute ischemia  No evidence of acute intracranial hemorrhage  Findings were marked as \"immediate\"in Epic and will now be related to the ordering physician or covering clinical team by the radiology liaison   Workstation performed: [id number]        Current Facility-Administered Medications   Medication Dose Route Frequency   • amLODIPine (NORVASC) tablet 10 mg  10 mg Oral Daily   • atorvastatin (LIPITOR) tablet 40 mg  40 mg Oral After Dinner   • dexamethasone (DECADRON) injection 4 mg  4 mg Intravenous Q6H [facility]   • enoxaparin (LOVENOX) subcutaneous injection 40 mg  40 mg Subcutaneous Daily   • levETIRAcetam (KEPPRA) 500 mg in sodium chloride 0 9 % 100 mL IVPB  500 mg Intravenous Q12H [facility]   • losartan (COZAAR) tablet 50 mg  50 mg Oral Daily   • pantoprazole (PROTONIX) EC tablet 40 mg  40 mg Oral Early Morning         Note completed by Cash Leigh, reviewed and edited by me the resident     Lior Osborne MD,   PGY1 FM    "

## 2023-03-21 NOTE — CONSULTS
Consultation - Medical Oncology   Alisha Ortiz 69 y o  male MRN: [id number]  Unit/Bed#: [facility] 625-01 Encounter: [id number]    Referring physician: Dr Sylvie Maciel  Date of service 3/21/2023  Reason for Consult: Lung mass and brain mass  HPI: Alisha Ortiz is a 69y o  year old male     I saw patient earlier today  Patient's wife was in the room  Patient presented with left hemiparesis, mild headache, chronic cough and exertional dyspnea  Previous history of stroke on left side 2 years ago and initially family felt that he  was having same problem again  On admission he was found to have mass in the brain on the right side with edema and also mass in right upper lung 3 8 x 3 5 cm with adjacent pleural thickening and right hilar lymphadenopathy and has small lower right paratracheal lymph node  Patient had MRI scan of the brain and CT scans of chest abdomen pelvis  Since admission patient's symptoms are improving probably because of Decadron  He has much less weakness in left arm  Also there is some improvement in weakness in left leg  No speech problem  Patient quit smoking cigarettes 30 years ago  He drinks alcohol socially  Patient's sister had breast cancer  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  History of hypertension and dyslipidemia  History of prostate cancer and stroke  ROS:  03/21/23 Reviewed 12 systems: See symptoms in HPI  Presently no other neurological, cardiac, pulmonary, GI and  symptoms other than listed in HPI  Other symptoms are in HPI  No  fever, chills, bleeding, bone pains, skin rash, weight loss, night sweats, arthritic symptoms,  tiredness ,  numbness, claudication   No frequent infections  Not unusually sensitive to heat or cold  No swelling of the ankles  No swollen glands  Patient is anxious         Historical Information   Past Medical History:   Diagnosis Date   • Hypertension    • [facility]   • Rectal bleeding    • Stroke ([facility] )        Past Surgical History:   Procedure Laterality Date   • COLONOSCOPY     • PROSTATECTOMY     • TONSILLECTOMY       Social History   Social History     Substance and Sexual Activity   Alcohol Use Yes   • Alcohol/week: 6 0 standard drinks   • Types: 6 Cans of beer per week    Comment: does not drink every day     Social History     Substance and Sexual Activity   Drug Use Not Currently   • Types: Marijuana     Social History     Tobacco Use   Smoking Status Former   • Packs/day: 1 00   • Years: 15    • Pack years: 15    • Types: Cigarettes   • Passive exposure: Never   Smokeless Tobacco Never   Tobacco Comments    i quit when i was 25  not sure of exact dates     Family History:   Family History   Problem Relation Age of Onset   • Dementia Mother            • Breast cancer Sister            • Prostate cancer Brother         Received Radiation         Current Facility-Administered Medications:   •  amLODIPine (NORVASC) tablet 10 mg, 10 mg, Oral, Daily, Ayde Astudillo MD, 10 mg at 23 8896  •  atorvastatin (LIPITOR) tablet 40 mg, 40 mg, Oral, After Berkley Bowman MD  •  dexamethasone (DECADRON) injection 4 mg, 4 mg, Intravenous, Q6H [address], Cassi Ward PA-C, 4 mg at 23 1311  •  enoxaparin (LOVENOX) subcutaneous injection 40 mg, 40 mg, Subcutaneous, Daily, Ayde Astudillo MD, 40 mg at 23 1043  •  levETIRAcetam (KEPPRA) 500 mg in sodium chloride 0 9 % 100 mL IVPB, 500 mg, Intravenous, Q12H [address], Ayde Astudillo MD, Last Rate: 400 mL/hr at 23 0834, 500 mg at 23 0834  •  losartan (COZAAR) tablet 50 mg, 50 mg, Oral, Daily, Ayde Astudillo MD, 50 mg at 23 2825  •  pantoprazole (PROTONIX) EC tablet 40 mg, 40 mg, Oral, Early Morning, Val Hodgson MD, 40 mg at 23 0627    No Known Allergies    Physical Exam:  Vitals:    23 1416 23 2149 23 0822 23 1210   BP:  151/83 (!) 165/108 (!) 165/108   Pulse:    75 "  Resp: 18 17 16    Temp: 98 6 °F (37 °C) 97 7 °F (36 5 °C) 97 6 °F (36 4 °C)    SpO2:       Weight:    82 6 kg (182 lb)   Height:    5' 9\" (1 753 m)     Alert, oriented, not in distress, vitals are above, no icterus, no oral thrush, no palpable neck mass, clear lung fields, regular heart rate, abdomen  soft and non tender, no palpable abdominal mass, no ascites, no edema of ankles, no calf tenderness, has slight weakness in left arm and left hand  and has weakness in left leg, no skin rash, no palpable lymphadenopathy in the neck and axillary areas,  no clubbing  Patient is anxious  Performance status 2   Lab Results: I have reviewed all pertinent labs  LABS:  Results for orders placed or performed during the hospital encounter of 03/20/23   Platelet count   Result Value Ref Range    Platelets 485 423 - 869 Thousands/uL    MPV 9 4 8 9 - 12 7 fL   Lipid Panel with Direct LDL reflex   Result Value Ref Range    Cholesterol 147 See Comment mg/dL    Triglycerides 64 See Comment mg/dL    HDL, Direct 43 >=40 mg/dL    LDL Calculated 91 0 - 100 mg/dL   Hemoglobin A1C   Result Value Ref Range    Hemoglobin A1C 6 0 (H) Normal 3 8-5 6%; PreDiabetic 5 7-6 4%;  Diabetic >=6 5%; Glycemic control for adults with diabetes <7 0% %     mg/dl   TSH, 3rd generation with Free T4 reflex   Result Value Ref Range    TSH 3RD GENERATON 2 173 0 450 - 4 500 uIU/mL   CBC and Platelet   Result Value Ref Range    WBC 12 31 (H) 4 31 - 10 16 Thousand/uL    RBC 4 42 3 88 - 5 62 Million/uL    Hemoglobin 13 1 12 0 - 17 0 g/dL    Hematocrit 39 7 36 5 - 49 3 %    MCV 90 82 - 98 fL    MCH 29 6 26 8 - 34 3 pg    MCHC 33 0 31 4 - 37 4 g/dL    RDW 12 2 11 6 - 15 1 %    Platelets 730 130 - 966 Thousands/uL    MPV 9 6 8 9 - 12 7 fL   Basic metabolic panel   Result Value Ref Range    Sodium 139 135 - 147 mmol/L    Potassium 3 8 3 5 - 5 3 mmol/L    Chloride 111 (H) 96 - 108 mmol/L    CO2 25 21 - 32 mmol/L    ANION GAP 3 (L) 4 - 13 mmol/L    " BUN 14 5 - 25 mg/dL    Creatinine 0 74 0 60 - 1 30 mg/dL    Glucose 152 (H) 65 - 140 mg/dL    Glucose, Fasting 152 (H) 65 - 99 mg/dL    Calcium 8 9 8 3 - 10 1 mg/dL    eGFR 94 ml/min/1 73sq m   Echo complete w/ contrast if indicated   Result Value Ref Range    LA size 3 9 cm    Aortic valve mean velocity 11 50 m/s    Triscuspid Valve Regurgitation Peak Gradient 25 0 mmHg    Tricuspid valve peak regurgitation velocity 2 52 m/s    LVPWd 1 40 cm    Left Atrium Area-systolic Apical Two Chamber 24 3 cm2    Left Atrium Area-systolic Four Chamber 57 9 cm2    MV E' Tissue Velocity Septal 7 cm/s    Tricuspid annular plane systolic excursion 8 25 cm    TR Peak Jamar 2 5 m/s    IVSd 6 10 cm    LV DIASTOLIC VOLUME (MOD BIPLANE) 2D 95 mL    LEFT VENTRICLE SYSTOLIC VOLUME (MOD BIPLANE) 2D 36 mL    Left ventricular stroke volume (2D) 59 00 mL    A4C EF 72 %    LA length (A2C) 5 60 cm    LVIDd 4 50 cm    IVS 1 3 cm    LVIDS 3 00 cm    FS 33 28 - 44 %    Asc Ao 3 6 cm    Ao root 3 30 cm    RVID d 3 8 cm    LVOT mn grad 4 0 mmHg    AV mean gradient 6 mmHg    AV LVOT peak gradient 8 mmHg    MV valve area p 1/2 method 4 00 cm2    E wave deceleration time 188 ms    LVOT peak jamar 1 41 m/s    LVOT peak VTI 28 9 cm    Aortic valve peak velocity 1 78 m/s    Ao VTI 33 73 cm    AV peak gradient 13 mmHg    MV Peak E Jamar 87 cm/s    MV Peak A Jamar 1 03 m/s    RAA A4C 15 1 cm2    MV stenosis pressure 1/2 time 55 ms    LVSV, 2D 59 mL    DVI 0 79     LV EF 75     Est  RA pres 5 0 mmHg    Right Ventricular Peak Systolic Pressure 56 82 mmHg   Fingerstick Glucose (POCT)   Result Value Ref Range    POC Glucose 98 65 - 140 mg/dl   Fingerstick Glucose (POCT)   Result Value Ref Range    POC Glucose 146 (H) 65 - 140 mg/dl   Fingerstick Glucose (POCT)   Result Value Ref Range    POC Glucose 167 (H) 65 - 140 mg/dl   Fingerstick Glucose (POCT)   Result Value Ref Range    POC Glucose 233 (H) 65 - 140 mg/dl         Imaging Studies: I have personally reviewed "pertinent reports  OSSEOUS STRUCTURES:  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 ,  Congenital  No lytic destructive changes     IMPRESSION:     3 8 x 3 5 cm right upper lobe lung mass with associated overlying pleural tag and adjacent to pleural thickening, this may be due to pleural reaction or mild pleural invasion     No contralateral lung nodules or mass     Right hilar lymphadenopathy        Small lower right paratracheal left paratracheal lymph node do not meet the criteria for pathologic enlargement on the bases of size or morphology     There is no CT evidence of for metastatic disease in the abdomen and pelvis     No lytic destructive lesion in the visualized bony skeleton  The study was marked in EPIC for significant notification       Workstation performed: [id number]        Imaging    CT chest abdomen pelvis w contrast (Order: [id number]) - 3/20/2023    IMPRESSION:     Rim-enhancing 2 4 cm centrally necrotic metastatic lesion in the right frontal lobe with surrounding severe vasogenic edema  Mass effect in the right hemisphere with approximately 2 4 mm right to left midline shift      Small focus of anterior cortical frontal acute to subacute ischemia      No evidence of acute intracranial hemorrhage      Findings were marked as \"immediate\"in Epic and will now be related to the ordering physician or covering clinical team by the radiology liaison            Workstation performed: [id number]        Imaging    MRI brain w wo contrast (Order: [id number]) - 3/19/2023    Pathology, and Other Studies: I have personally reviewed pertinent reports  Assessment and Plan:  See diagnoses, orders instructions below  Brain mass-suspected metastatic disease from lung cancer  Lung mass-suspected to be primary lung cancer with regional lymph node metastasis  Previous history of stroke  History of hypertension  History of dyslipidemia    Patient will be going for brain " surgery and that should give us tissue diagnosis and additional recommendations to follow  I will also proceed with nuclear bone scan or patient may have PET CT scan  PET CT scan will be preferred and can be arranged on outpatient basis     All discussed with patient and his wife in detail  Questions answered  Oncologic follow-up in our office within 2 weeks  Discussed brain radiation and systemic therapy per final recommendations to be made  Patient will continue to follow with  primary physician and other consultants  Patient voiced understanding and agrees      Disclaimer: This document was prepared using a dictation device  If a word or phrase is confusing, or does not make sense, this is likely due to recognition error which was not discovered during the providers review  If you believe an error has occurred, please Contact me through [employer] service for yumiko? cation  Counseling / Coordination of Care    Rocky Higginbotham   Provided counseling and support

## 2023-03-21 NOTE — PROGRESS NOTES
[facility]  Progress Note - Mingohenrry Dylan 1955, 69 y o  male MRN: [id number]  Unit/Bed#: [facility] 625-01 Encounter: [id number]  Primary Care Provider: Hugo Fernandez DO   Date and time admitted to hospital: 3/20/2023 12:13 AM    * Brain mass  Assessment & Plan  R frontal brain mass w/ surrounding vasogenic edema   - presented to the [place] ED on 3/19 with progressively worsening LUE and LLE weakness  - concern for possible metastatic disease given noted RUL mass on CT c/a/p   - pt reports remote hx of prostate CA s/p prostatectomy several yrs ago and no further intervention or f/u since    Imaging:   · 3/19/2023 MRI brain w/wo: Rim-enhancing 2 4 cm centrally necrotic metastatic lesion in the right frontal lobe with surrounding severe vasogenic edema  Mass effect in the right hemisphere with approximately 2 4 mm right to left midline shift  Small focus of anterior cortical frontal acute to subacute ischemia   No evidence of acute intracranial hemorrhage    Plan:   · Continue to closely monitor neuro exam at this time   · frequent neuro checks per primary team   · Repeat STAT CTH with any acute decline in GCS > 2pts or more   · Maintain normotensive BP goals, SBP < 160   · No acute/emergent neuorsurgical intervention indicated at this time   · Imaging and case reviewed by nsgy team  · Ongoing discussion amongst nsgy team in regard to surgical plans    · Tentative plan for OR on Thursday with Dr Afia Lewis   · Please obtain repeat MRI brain with directional DTI sequence pre-operatively   · Please obtain pre-op medical clearance  · Continue ongoing evaluation of new brain mass/metastatic workup  · Continue keppra 500mg q 12hrs   · Continue decadron 4mg q 6hrs   · Continue to hold home ASA given possible need for neurosurgical intervention   · DVT ppx: SCDs, SQ lovenox   · Pain control per primary team   · PT/OT   · Continue medical management per primary team   · Consider heme/onc and "rad/onc consults given concern for lung primary disease with RUL mass noted   · Social work following for assistance with dispo once medically stable/cleared     Neurosurgery will continue to closely follow  Please reach out with any further questions or concerns  Brain compression [facility])  Assessment & Plan  - see plan as further documented above     Cerebral edema [facility])  Assessment & Plan  - see plan as further documented above         Subjective/Objective   Chief Complaint: \"I was able to tie the drawstring on my pants today, my L arm feels stronger\"    Subjective: Pt seen and examined this am on rounds  NAEO  Pt admits to headaches that have been intermittent over the last few weeks  Denies any headaches at this time  Denies any vision changes, N/V, dizziness, numbness  Pt states he feels his L sided weakness is getting much better since his admission to the hospital      Objective: pleasant, middle aged male sitting comfortably in the chair  In no acute distress  I/O       03/19 0701  03/20 0700 03/20 0701  03/21 0700 03/21 0701  03/22 0700    P  O   585     Total Intake(mL/kg)  585 (7 1)     Net  +585            Unmeasured Urine Occurrence  3 x 1 x    Unmeasured Stool Occurrence  0 x         Invasive Devices     Peripheral Intravenous Line  Duration           Peripheral IV 03/19/23 Proximal;Right;Ventral (anterior) Forearm 1 day              Physical Exam:  Vitals: Blood pressure (!) 165/108, pulse 75, temperature 97 6 °F (36 4 °C), resp  rate 16, height 5' 9\" (1 753 m), weight 82 6 kg (182 lb), SpO2 97 %  ,Body mass index is 26 88 kg/m²       General appearance: pleasant, alert, appears stated age, cooperative and no distress  Head: Normocephalic, without obvious abnormality, atraumatic  Eyes: EOMI, PERRL  Neck: supple, symmetrical, trachea midline and NT  Back: no kyphosis present, no tenderness to percussion or palpation  Lungs: non labored breathing, no resp distress on room air   Heart: regular heart " rate  Neurologic:   Mental status: Alert, oriented x 3, thought content appropriate  Cranial nerves: grossly intact (Cranial nerves II-XII)  Sensory: normal to light touch to franco UE and franco LE   Motor:  L sided weakness appreciated  - LUE rated 4/5 throughout  - LLE rated 4/5 throughout   RUE and RLE with intact strength, rated 5/5 throughout   Reflexes: 2+ and symmetric  Coordination: finger to nose normal bilaterally, no drift bilaterally    Lab Results:  Results from last 7 days   Lab Units 03/21/23  0628 03/20/23  0136 03/19/23  1148   WBC Thousand/uL 12 31*  --  8 32   HEMOGLOBIN g/dL 13 1  --  14 8   HEMATOCRIT % 39 7  --  44 5   PLATELETS Thousands/uL 291 287 316     Results from last 7 days   Lab Units 03/21/23  0628 03/19/23  1148   POTASSIUM mmol/L 3 8 3 8   CHLORIDE mmol/L 111* 105   CO2 mmol/L 25 26   BUN mg/dL 14 15   CREATININE mg/dL 0 74 0 91   CALCIUM mg/dL 8 9 9 6             Results from last 7 days   Lab Units 03/19/23  1148   INR  0 96   PTT seconds 27     No results found for: TROPONINT  ABG:No results found for: PHART, NKY1DWO, PO2ART, ZLV3KCG, L1VABLCM, BEART, SOURCE    Imaging Studies: I have personally reviewed pertinent reports  and I have personally reviewed pertinent films in PACS    CTA head and neck with and without contrast    Result Date: 3/19/2023  Narrative: CTA NECK AND BRAIN WITH AND WITHOUT CONTRAST INDICATION: weakness   History of stroke  Prostate cancer  COMPARISON:   CT from 2/11/2020 TECHNIQUE:  Routine CT imaging of the Brain without contrast   Post contrast imaging was performed after administration of iodinated contrast through the neck and brain  Post contrast axial 0 625 mm images timed to opacify the arterial system  3D rendering was performed on an independent workstation  MIP reconstructions performed  Coronal reconstructions were performed of the noncontrast portion of the brain  Radiation dose length product (DLP) for this visit:  1279 mGy-cm     This examination, like all CT scans performed in the [facility], was performed utilizing techniques to minimize radiation dose exposure, including the use of iterative reconstruction and automated exposure control  IV Contrast:  85 mL of iohexol (OMNIPAQUE)  IMAGE QUALITY:   Diagnostic FINDINGS: NONCONTRAST BRAIN PARENCHYMA:  There is a cystic lesion within the right frontal lobe measuring 2 0 x 2 2 cm  There is regional vasogenic edema  There is local mass effect on the right lateral ventricle with displacement inferiorly  There is no significant midline shift  There is no evidence for acute intracranial hemorrhage  There is no CT evidence for a large acute vascular distribution infarct  There is a chronic right thalamocapsular lacunar infarct, stable  VENTRICLES AND EXTRA-AXIAL SPACES:  Normal for the patient's age  VISUALIZED ORBITS: Normal visualized orbits  PARANASAL SINUSES: Normal visualized paranasal sinuses  CERVICAL VASCULATURE AORTIC ARCH AND GREAT VESSELS:  Normal aortic arch and great vessel origins  Normal visualized subclavian vessels  RIGHT VERTEBRAL ARTERY CERVICAL SEGMENT:  Mild atherosclerotic plaque at the origin with mild narrowing  The vessel is normal in caliber throughout the neck  LEFT VERTEBRAL ARTERY CERVICAL SEGMENT:  Tortuosity and narrowing with atherosclerotic plaque at the origin of the left vertebral artery  The vessel is normal in caliber throughout the neck  RIGHT EXTRACRANIAL CAROTID SEGMENT:  Mild atherosclerotic disease of the distal common carotid artery and proximal cervical internal carotid artery without significant stenosis compared to the more distal ICA  LEFT EXTRACRANIAL CAROTID SEGMENT:  Mild atherosclerotic disease of the distal common carotid artery and proximal cervical internal carotid artery without significant stenosis compared to the more distal ICA  NASCET criteria was used to determine the degree of internal carotid artery diameter stenosis  INTRACRANIAL VASCULATURE INTERNAL CAROTID ARTERIES:  Mild atherosclerotic plaque along the cavernous segments  No evidence for high-grade stenosis or focal occlusion  ANTERIOR CIRCULATION:  Symmetric A1 segments and anterior cerebral arteries with normal enhancement  Normal anterior communicating artery  MIDDLE CEREBRAL ARTERY CIRCULATION:  M1 segment and middle cerebral artery branches demonstrate normal enhancement bilaterally  DISTAL VERTEBRAL ARTERIES:  Normal distal vertebral arteries  Posterior inferior cerebellar artery origins are normal  Normal vertebral basilar junction  BASILAR ARTERY:  Basilar artery is normal in caliber  Normal superior cerebellar arteries  POSTERIOR CEREBRAL ARTERIES: Both posterior cerebral arteries arises from the basilar tip  Both arteries demonstrate normal enhancement  There is a right-sided posterior communicating artery  VENOUS STRUCTURES:  Normal  NON VASCULAR ANATOMY BONY STRUCTURES:  There is cervical spondylosis  No discrete lytic or blastic osseous lesions identified within the visualized osseous structures  SOFT TISSUES OF THE NECK:  Unremarkable  THORACIC INLET:  There is a right upper lobe mass measuring 3 3 x 3 6 cm  There is partially imaged right hilar adenopathy  Impression: Right frontal lobe cystic lesion with surrounding vasogenic edema most consistent with metastatic disease given findings described below  Local mass effect on the right lateral ventricle without significant midline shift  Contrast-enhanced MRI of the brain recommended for further evaluation  Right upper lobe mass with partially imaged right hilar adenopathy  Dedicated CT of the chest, abdomen and pelvis is recommended for further evaluation  No evidence for high-grade stenosis, major branch vessel occlusion or vascular aneurysm of the cervical or intracranial vessels    I personally discussed this study with ESAU FANG on 3/19/2023 at 2:08 PM  Workstation performed: [id number] XR chest 1 view portable    Result Date: 3/20/2023  Narrative: CHEST INDICATION:   sob  COMPARISON:  2/11/2020 EXAM PERFORMED/VIEWS:  XR CHEST PORTABLE FINDINGS: Cardiomediastinal silhouette appears unremarkable  Peripheral right upper lung 4 0 cm mass; known  CT thorax already recommended  The lungs are clear  No pneumothorax or pleural effusion  Osseous structures appear within normal limits for patient age  Impression: No acute cardiopulmonary disease  Right upper lung mass  Workstation performed: [id number]     MRI brain w wo contrast    Result Date: 3/19/2023  Narrative: MRI BRAIN WITH AND WITHOUT CONTRAST INDICATION: Lower extremity weakness  COMPARISON:  Same date CT TECHNIQUE: Multiplanar, multisequence imaging of the brain was performed before and after gadolinium administration  IV Contrast:  7 mL of Gadobutrol injection (SINGLE-DOSE)  IMAGE QUALITY:   Diagnostic  FINDINGS: BRAIN PARENCHYMA: Rim-enhancing 2 4 cm centrally necrotic metastatic lesion in the right frontal lobe with surrounding severe vasogenic edema  Mass effect in the right hemisphere with approximately 2 4 mm right to left midline shift  Single focus of diffusion restriction with T-2/flair signal abnormality in the anterior right frontal lobe anterior to the cystic metastatic lesion suggesting a focus of cortical frontal anterior acute ischemia  No evidence of acute intracranial hemorrhage  VENTRICLES:  Normal for the patient's age  SELLA AND PITUITARY GLAND:  Normal  ORBITS:  Normal  PARANASAL SINUSES:  Normal  VASCULATURE:  Evaluation of the major intracranial vasculature demonstrates appropriate flow voids  CALVARIUM AND SKULL BASE:  Normal  EXTRACRANIAL SOFT TISSUES:  Normal      Impression: Rim-enhancing 2 4 cm centrally necrotic metastatic lesion in the right frontal lobe with surrounding severe vasogenic edema  Mass effect in the right hemisphere with approximately 2 4 mm right to left midline shift   Small focus of "anterior cortical frontal acute to subacute ischemia  No evidence of acute intracranial hemorrhage  Findings were marked as \"immediate\"in Epic and will now be related to the ordering physician or covering clinical team by the radiology liaison  Workstation performed: [id number]     CT chest abdomen pelvis w contrast    Result Date: 3/21/2023  Narrative: CT CHEST, ABDOMEN AND PELVIS WITH IV CONTRAST INDICATION:   metastatic brain mass, RUL lung mass seen on CT head neck  COMPARISON:  None  TECHNIQUE: CT examination of the chest, abdomen and pelvis was performed  Axial, sagittal, and coronal 2D reformatted images were created from the source data and submitted for interpretation  Radiation dose length product (DLP) for this visit:  1044 99 mGy-cm   This examination, like all CT scans performed in the [facility], was performed utilizing techniques to minimize radiation dose exposure, including the use of iterative reconstruction and automated exposure control  IV Contrast:  100 mL of iohexol (OMNIPAQUE) Enteric Contrast: Enteric contrast was administered  FINDINGS: CHEST LUNGS:  Right upper lobe lung mass seen measuring 3 8 x 3 5 cm  A pleural tag is seen extending from this mass with associated minimal overlying pleural thickening appendix There is no extension of the neck mass to the chest wall There is no contralateral suspicious lung nodule PLEURA:  Mild pleural thickening overlying the area of the right upper lobe mass HEART/GREAT VESSELS: Heart is unremarkable for patient's age  No thoracic aortic aneurysm   Ascending aorta measures 4 2 cm MEDIASTINUM AND ROSEMARY:  Lower right paratracheal lymph nodes are seen measuring about 7 5 mm Subcarinal lymph nodes are seen measuring 6 mm Enlarged right hilar lymph node seen measuring about 1 7 cm CHEST WALL AND LOWER NECK:  No significant axillary lymph node enlargement seen there are no lymphadenopathy in the lower neck ABDOMEN LIVER/BILIARY TREE:  No " focal liver lesion GALLBLADDER:  No calcified gallstones  No pericholecystic inflammatory change  SPLEEN:  Unremarkable  PANCREAS:  Unremarkable  ADRENAL GLANDS:  Unremarkable  KIDNEYS/URETERS:  Right renal cyst seen STOMACH AND BOWEL:  No abnormal dilation of the small bowel loops seen Ileocecal junction appear unremarkable The stomach is mildly distended  Mild thickening of the antrum, nonspecific APPENDIX:  No findings to suggest appendicitis  ABDOMINOPELVIC CAVITY:  No ascites  No pneumoperitoneum  Small para-aortic lymph nodes do not meet the criteria for pathological alignment on the bases VESSELS:  Celiac trunk, SMA appear unremarkable EMMA appear unremarkable PELVIS REPRODUCTIVE ORGANS:  Unremarkable for patient's age  URINARY BLADDER:  Unremarkable  ABDOMINAL WALL/INGUINAL REGIONS:  Umbilical hernia seen containing fat OSSEOUS STRUCTURES:  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 ,  Congenital No lytic destructive changes     Impression: 3 8 x 3 5 cm right upper lobe lung mass with associated overlying pleural tag and adjacent to pleural thickening, this may be due to pleural reaction or mild pleural invasion No contralateral lung nodules or mass Right hilar lymphadenopathy  Small lower right paratracheal left paratracheal lymph node do not meet the criteria for pathologic enlargement on the bases of size or morphology There is no CT evidence of for metastatic disease in the abdomen and pelvis No lytic destructive lesion in the visualized bony skeleton The study was marked in EPIC for significant notification  Workstation performed: [id number]       EKG, Pathology, and Other Studies: I have personally reviewed pertinent reports        VTE Pharmacologic Prophylaxis: Sequential compression device (Venodyne)  and Enoxaparin (Lovenox)    VTE Mechanical Prophylaxis: sequential compression device

## 2023-03-21 NOTE — RESTORATIVE TECHNICIAN NOTE
Restorative Technician Note      Patient Name: Costa Rollins     Restorative Tech Visit Date: 03/21/23  Note Type: Mobility  Patient Position Upon Consult: Seated edge of bed  Activity Performed: Ambulated  Assistive Device: Roller walker  Patient Position at End of Consult: Seated edge of bed;  All needs within reach    William Blunt, Restorative Technician

## 2023-03-22 ENCOUNTER — APPOINTMENT (INPATIENT)
Dept: RADIOLOGY | Facility: HOSPITAL | Age: 68
End: 2023-03-22

## 2023-03-22 LAB
ABO GROUP BLD: NORMAL
BASOPHILS # BLD AUTO: 0.02 THOUSANDS/ÂΜL (ref 0–0.1)
BASOPHILS NFR BLD AUTO: 0 % (ref 0–1)
BLD GP AB SCN SERPL QL: NEGATIVE
EOSINOPHIL # BLD AUTO: 0 THOUSAND/ÂΜL (ref 0–0.61)
EOSINOPHIL NFR BLD AUTO: 0 % (ref 0–6)
ERYTHROCYTE [DISTWIDTH] IN BLOOD BY AUTOMATED COUNT: 12.4 % (ref 11.6–15.1)
EST. AVERAGE GLUCOSE BLD GHB EST-MCNC: 123 MG/DL
GLUCOSE SERPL-MCNC: 120 MG/DL (ref 65–140)
GLUCOSE SERPL-MCNC: 147 MG/DL (ref 65–140)
GLUCOSE SERPL-MCNC: 183 MG/DL (ref 65–140)
HBA1C MFR BLD: 5.9 %
HCT VFR BLD AUTO: 38.8 % (ref 36.5–49.3)
HGB BLD-MCNC: 12.9 G/DL (ref 12–17)
IMM GRANULOCYTES # BLD AUTO: 0.14 THOUSAND/UL (ref 0–0.2)
IMM GRANULOCYTES NFR BLD AUTO: 1 % (ref 0–2)
LYMPHOCYTES # BLD AUTO: 0.76 THOUSANDS/ÂΜL (ref 0.6–4.47)
LYMPHOCYTES NFR BLD AUTO: 6 % (ref 14–44)
MCH RBC QN AUTO: 30.1 PG (ref 26.8–34.3)
MCHC RBC AUTO-ENTMCNC: 33.2 G/DL (ref 31.4–37.4)
MCV RBC AUTO: 91 FL (ref 82–98)
MONOCYTES # BLD AUTO: 0.47 THOUSAND/ÂΜL (ref 0.17–1.22)
MONOCYTES NFR BLD AUTO: 4 % (ref 4–12)
NEUTROPHILS # BLD AUTO: 10.74 THOUSANDS/ÂΜL (ref 1.85–7.62)
NEUTS SEG NFR BLD AUTO: 89 % (ref 43–75)
NRBC BLD AUTO-RTO: 0 /100 WBCS
PLATELET # BLD AUTO: 310 THOUSANDS/UL (ref 149–390)
PMV BLD AUTO: 10.3 FL (ref 8.9–12.7)
RBC # BLD AUTO: 4.28 MILLION/UL (ref 3.88–5.62)
RH BLD: POSITIVE
SPECIMEN EXPIRATION DATE: NORMAL
WBC # BLD AUTO: 12.13 THOUSAND/UL (ref 4.31–10.16)

## 2023-03-22 RX ORDER — CEFAZOLIN SODIUM 2 G/50ML
2000 SOLUTION INTRAVENOUS ONCE
Status: COMPLETED | OUTPATIENT
Start: 2023-03-23 | End: 2023-03-23

## 2023-03-22 RX ORDER — CHLORHEXIDINE GLUCONATE 0.12 MG/ML
15 RINSE ORAL ONCE
Status: COMPLETED | OUTPATIENT
Start: 2023-03-22 | End: 2023-03-23

## 2023-03-22 RX ORDER — SODIUM CHLORIDE 9 MG/ML
125 INJECTION, SOLUTION INTRAVENOUS CONTINUOUS
Status: DISCONTINUED | OUTPATIENT
Start: 2023-03-22 | End: 2023-03-23

## 2023-03-22 RX ADMIN — LEVETIRACETAM 500 MG: 100 INJECTION, SOLUTION INTRAVENOUS at 08:15

## 2023-03-22 RX ADMIN — DEXAMETHASONE SODIUM PHOSPHATE 4 MG: 4 INJECTION INTRA-ARTICULAR; INTRALESIONAL; INTRAMUSCULAR; INTRAVENOUS; SOFT TISSUE at 00:55

## 2023-03-22 RX ADMIN — LOSARTAN POTASSIUM 50 MG: 50 TABLET, FILM COATED ORAL at 08:15

## 2023-03-22 RX ADMIN — SODIUM CHLORIDE 125 ML/HR: 0.9 INJECTION, SOLUTION INTRAVENOUS at 23:22

## 2023-03-22 RX ADMIN — ENOXAPARIN SODIUM 40 MG: 40 INJECTION SUBCUTANEOUS at 08:15

## 2023-03-22 RX ADMIN — DEXAMETHASONE SODIUM PHOSPHATE 4 MG: 4 INJECTION INTRA-ARTICULAR; INTRALESIONAL; INTRAMUSCULAR; INTRAVENOUS; SOFT TISSUE at 23:20

## 2023-03-22 RX ADMIN — DEXAMETHASONE SODIUM PHOSPHATE 4 MG: 4 INJECTION INTRA-ARTICULAR; INTRALESIONAL; INTRAMUSCULAR; INTRAVENOUS; SOFT TISSUE at 13:06

## 2023-03-22 RX ADMIN — PANTOPRAZOLE SODIUM 40 MG: 40 TABLET, DELAYED RELEASE ORAL at 06:40

## 2023-03-22 RX ADMIN — DEXAMETHASONE SODIUM PHOSPHATE 4 MG: 4 INJECTION INTRA-ARTICULAR; INTRALESIONAL; INTRAMUSCULAR; INTRAVENOUS; SOFT TISSUE at 17:43

## 2023-03-22 RX ADMIN — AMLODIPINE BESYLATE 10 MG: 10 TABLET ORAL at 08:15

## 2023-03-22 RX ADMIN — LEVETIRACETAM 500 MG: 100 INJECTION, SOLUTION INTRAVENOUS at 21:35

## 2023-03-22 RX ADMIN — DEXAMETHASONE SODIUM PHOSPHATE 4 MG: 4 INJECTION INTRA-ARTICULAR; INTRALESIONAL; INTRAMUSCULAR; INTRAVENOUS; SOFT TISSUE at 06:40

## 2023-03-22 RX ADMIN — ATORVASTATIN CALCIUM 40 MG: 40 TABLET, FILM COATED ORAL at 17:43

## 2023-03-22 NOTE — PROGRESS NOTES
"      Progress Notes - Family Medicine Residency, Bambi Palm 1955, 69 y o  male  MRN: [id number]    Unit/Bed#: [facility] 625-01 Encounter: [id number]  Primary Care Provider: Verna Morales DO      Admission Date: 3/20/2023 0013  Length of Stay: 2 days  Code Status:  Level 1 - Full Code  Consult:   IP CONSULT TO NEUROSURGERY  IP CONSULT TO NEUROLOGY  IP CONSULT TO PULMONOLOGY  IP CONSULT TO ONCOLOGY    * Brain mass  Assessment & Plan  Presenting with 1 week progressive L sided weakness upper and lower extremities   CTA head and neck 3/19/23 \"Right frontal lobe cystic lesion with surrounding vasogenic edema most consistent with metastatic disease given findings described below  Local mass effect on the right lateral ventricle without significant midline shift  Right upper lobe mass with partially imaged right hilar adenopathy  \"  MRI brain w wo contrast 3/19/23 \"Rim-enhancing 2 4 cm centrally necrotic metastatic lesion in the right frontal lobe with surrounding severe vasogenic edema  Mass effect in the right hemisphere with approximately 2 4 mm right to left midline shift  Small focus of anterior cortical frontal acute to subacute ischemia  No evidence of acute intracranial hemorrhage  \"  Given findings, transfer from [place] after discussion with neurocritical and NSGY for biopsy   Pt had leukocytosis on 03/21 of 12 31 likely 2/2 to steroids  3/22/2023: Repeat Brain MRI with DTI: no interval changes, stable known brain mass with high suspicion of metastatic disease (Lung)   3/21/2023: TTE EF 75%, unremarkable finding   3/22/2023: CT A/P: Besides the known lung mass seen on previous exam, no abdominal/ pelvis metastasis observed    Plan:   - continue decadron 4mg q6h, keppra 500 mg q12h, Protonix 40 mg for ulcer ppx  - neurology, pulm, hem/onc and NSGY consulted, appreciate recommendations:  - Dat Hernandes will plan for OR 3/23/2023 for resection of brain mass  Holding ASA   NPO at mid night   - Continue " "atorvastatin 40 mg daily in the evening given CVA hx    - counseled patients on risks vs benefits of holding aspirin  Pt in agreement with holding ASA for possible biopsy/resection of mass later this week  - hem/onc consulted for management of metastatic malignancy, appreciate recommendations   - Will f/u with morning CBC w/ diff  - Will monitor fingerstick glucose   - PT/OT eval when appropriate  Will likely need SNF placement on dc       Lung nodule  Assessment & Plan  Has a 20 pk-year smoking history, quit 30 years ago  Pt has no previous hx of lung cancer screening and has no pulm symptoms  CTA head and neck initially done on 3/19/2023 incidentally showed RUL mass with partially imaged right hilar adenopathy  CT chest/abd/pelvis with contrast 3/20/2023 shows \"3 8 x 3 5 cm right upper lobe lung mass with associated overlying pleural tag and adjacent to pleural thickening, this may be due to pleural reaction or mild pleural invasion  No contralateral lung nodules or mass  Right hilar lymphadenopathy  Small lower right paratracheal left paratracheal lymph node do not meet the criteria for pathologic enlargement on the bases of size or morphology  There is no CT evidence of for metastatic disease in the abdomen and pelvis  No lytic destructive lesion in the visualized bony skeleton  \" which is concerning for a primary lung tumor given recently diagnosed brain mass concerning for metastasis on MRI brain  Given findings,    -Consulted pulmonology: Agreed with proceeding with brain mass resection and biopsy, no immediate pulmonology intervention at this point, will wait for staging and pathology from biopsy    -Consulted hem/onc for management of metastatic malignancy: Recommend full body bone scan which is completed on 3/22  Patient is recommended to follow-up with heme-onc 2 weeks after discharge, possible PET-CT; they will continue following and waiting for biopsy results         Cerebral edema " "(HCC)  Assessment & Plan  - Per MRI 3/22; no increase of vasogenic edema   - Continue with Keppra and Steroid at current dosage     CVA (cerebral vascular accident) [facility])  Assessment & Plan  Hx CVA 11-12 years ago with mild residual L sided deficits, baseline fully functional motor  - will hold aspirin 81 mg for scheduled surgery 3/23/2023    Essential hypertension  Assessment & Plan  Systolic (90CBB), PER:717 , Min:139 , Max:165     Initial 177/98 on transfer  Continue PTA medications amlodipine 10 mg and losartan 50 mg daily, can continue antihypertensive med day of surgery             VTE Pharmacologic Prophylaxis: Enoxaparin (Lovenox)  VTE Mechanical Prophylaxis: sequential compression device  Diet: Regular House  Code Status: Full code  Disposition: Brain surgery 3/23 w  Biopsy; continue inpatient    Discussed with attending physician, Tamara Chavez, and [facility] team       Subjective:   Seen and examined at bed side; s/p MRI brain  Pt reported feeling fine, strength is 5/5 upper and lower extremity b/l  Understood plan to have bone scan today and brain surgery tomorrow  Surgeon already met and discussed with family  Other specialist (Lung and hem-onc) following and waiting for biopsy result  Objective:     Vitals: Blood pressure 154/100, pulse 66, temperature 97 5 °F (36 4 °C), resp  rate 20, height 5' 9\" (1 753 m), weight 82 6 kg (182 lb), SpO2 95 %  ,Body mass index is 26 88 kg/m²  Intake/Output Summary (Last 24 hours) at 3/22/2023 1109  Last data filed at 3/21/2023 1830  Gross per 24 hour   Intake 237 ml   Output --   Net 237 ml       Physical Exam  Constitutional:       General: He is not in acute distress  HENT:      Head: Normocephalic and atraumatic  Right Ear: External ear normal       Left Ear: External ear normal       Nose: No congestion  Mouth/Throat:      Pharynx: No oropharyngeal exudate  Eyes:      General: No scleral icterus    Cardiovascular:      Rate and Rhythm: " Normal rate and regular rhythm  Heart sounds: Normal heart sounds  No murmur heard  Pulmonary:      Effort: No respiratory distress  Breath sounds: No wheezing  Abdominal:      General: Abdomen is flat  There is no distension  Palpations: Abdomen is soft  Tenderness: There is no abdominal tenderness  Musculoskeletal:         General: No swelling or tenderness  Normal range of motion  Skin:     General: Skin is warm  Capillary Refill: Capillary refill takes less than 2 seconds  Coloration: Skin is not jaundiced  Neurological:      General: No focal deficit present  Mental Status: He is alert and oriented to person, place, and time  Cranial Nerves: No cranial nerve deficit  Sensory: No sensory deficit  Motor: No weakness  Coordination: Coordination normal    Psychiatric:         Mood and Affect: Mood normal          Behavior: Behavior normal          Invasive Devices     Peripheral Intravenous Line  Duration           Peripheral IV 03/19/23 Proximal;Right;Ventral (anterior) Forearm 2 days                           Lab and other studies:  I have personally reviewed pertinent reports       Admission on 03/20/2023   Component Date Value   • Platelets 67/03/7448 287    • MPV 03/20/2023 9 4    • LA size 03/21/2023 3 9    • Aortic valve mean veloci* 03/21/2023 11 50    • Triscuspid Valve Regurgi* 03/21/2023 25 0    • Tricuspid valve peak reg* 03/21/2023 2 52    • LVPWd 03/21/2023 1 40    • Left Atrium Area-systoli* 03/21/2023 24 3    • Left Atrium Area-systoli* 03/21/2023 21 6    • MV E' Tissue Velocity Se* 03/21/2023 7    • Tricuspid annular plane * 03/21/2023 2 40    • TR Peak Jamar 03/21/2023 2 5    • IVSd 03/21/2023 2 05    • LV DIASTOLIC VOLUME (MOD* 94/32/8051 95    • LEFT VENTRICLE SYSTOLIC * 90/43/3076 36    • Left ventricular stroke * 03/21/2023 59 00    • A4C EF 03/21/2023 72    • LA length (A2C) 03/21/2023 5 60    • LVIDd 03/21/2023 4 50    • IVS 03/21/2023 1 3    • LVIDS 03/21/2023 3 00    • FS 03/21/2023 33    • Asc Ao 03/21/2023 3 6    • Ao root 03/21/2023 3 30    • RVID d 03/21/2023 3 8    • LVOT mn grad 03/21/2023 4 0    • AV mean gradient 03/21/2023 6    • AV LVOT peak gradient 03/21/2023 8    • MV valve area p 1/2 meth* 03/21/2023 4 00    • E wave deceleration time 03/21/2023 188    • LVOT peak jamar 03/21/2023 1 41    • LVOT peak VTI 03/21/2023 28 9    • Aortic valve peak veloci* 03/21/2023 1 78    • Ao VTI 03/21/2023 33 73    • AV peak gradient 03/21/2023 13    • MV Peak E Jamar 03/21/2023 87    • MV Peak A Jamar 03/21/2023 1 03    • RAA A4C 03/21/2023 15 1    • MV stenosis pressure 1/2* 03/21/2023 55    • LVSV, 2D 03/21/2023 59    • DVI 03/21/2023 0 79    • LV EF 03/21/2023 75    • Est  RA pres 03/21/2023 5 0    • Right Ventricular Peak S* 03/21/2023 30 00    • Cholesterol 03/19/2023 147    • Triglycerides 03/19/2023 64    • HDL, Direct 03/19/2023 43    • LDL Calculated 03/19/2023 91    • Hemoglobin A1C 03/20/2023 6 0 (H)    • EAG 03/20/2023 126    • TSH 3RD GENERATON 03/19/2023 2  173    • POC Glucose 03/20/2023 98    • POC Glucose 03/20/2023 146 (H)    • POC Glucose 03/20/2023 167 (H)    • WBC 03/21/2023 12 31 (H)    • RBC 03/21/2023 4 42    • Hemoglobin 03/21/2023 13 1    • Hematocrit 03/21/2023 39 7    • MCV 03/21/2023 90    • MCH 03/21/2023 29 6    • MCHC 03/21/2023 33 0    • RDW 03/21/2023 12 2    • Platelets 28/92/4513 291    • MPV 03/21/2023 9 6    • Sodium 03/21/2023 139    • Potassium 03/21/2023 3 8    • Chloride 03/21/2023 111 (H)    • CO2 03/21/2023 25    • ANION GAP 03/21/2023 3 (L)    • BUN 03/21/2023 14    • Creatinine 03/21/2023 0 74    • Glucose 03/21/2023 152 (H)    • Glucose, Fasting 03/21/2023 152 (H)    • Calcium 03/21/2023 8 9    • eGFR 03/21/2023 94    • POC Glucose 03/21/2023 233 (H)    • POC Glucose 03/21/2023 183 (H)    • WBC 03/22/2023 12 13 (H)    • RBC 03/22/2023 4 28    • Hemoglobin 03/22/2023 12 9    • Hematocrit 03/22/2023 38 8    • MCV 03/22/2023 91    • MCH 03/22/2023 30 1    • MCHC 03/22/2023 33 2    • RDW 03/22/2023 12 4    • MPV 03/22/2023 10 3    • Platelets 65/08/8967 310    • nRBC 03/22/2023 0    • Neutrophils Relative 03/22/2023 89 (H)    • Immat GRANS % 03/22/2023 1    • Lymphocytes Relative 03/22/2023 6 (L)    • Monocytes Relative 03/22/2023 4    • Eosinophils Relative 03/22/2023 0    • Basophils Relative 03/22/2023 0    • Neutrophils Absolute 03/22/2023 10 74 (H)    • Immature Grans Absolute 03/22/2023 0 14    • Lymphocytes Absolute 03/22/2023 0 76    • Monocytes Absolute 03/22/2023 0 47    • Eosinophils Absolute 03/22/2023 0 00    • Basophils Absolute 03/22/2023 0 02    • POC Glucose 03/22/2023 120        Recent Results (from the past 24 hour(s))   Echo complete w/ contrast if indicated    Collection Time: 03/21/23 12:15 PM   Result Value Ref Range    LA size 3 9 cm    Aortic valve mean velocity 11 50 m/s    Triscuspid Valve Regurgitation Peak Gradient 25 0 mmHg    Tricuspid valve peak regurgitation velocity 2 52 m/s    LVPWd 1 40 cm    Left Atrium Area-systolic Apical Two Chamber 24 3 cm2    Left Atrium Area-systolic Four Chamber 30 7 cm2    MV E' Tissue Velocity Septal 7 cm/s    Tricuspid annular plane systolic excursion 8 12 cm    TR Peak Jamar 2 5 m/s    IVSd 5 02 cm    LV DIASTOLIC VOLUME (MOD BIPLANE) 2D 95 mL    LEFT VENTRICLE SYSTOLIC VOLUME (MOD BIPLANE) 2D 36 mL    Left ventricular stroke volume (2D) 59 00 mL    A4C EF 72 %    LA length (A2C) 5 60 cm    LVIDd 4 50 cm    IVS 1 3 cm    LVIDS 3 00 cm    FS 33 28 - 44 %    Asc Ao 3 6 cm    Ao root 3 30 cm    RVID d 3 8 cm    LVOT mn grad 4 0 mmHg    AV mean gradient 6 mmHg    AV LVOT peak gradient 8 mmHg    MV valve area p 1/2 method 4 00 cm2    E wave deceleration time 188 ms    LVOT peak jamar 1 41 m/s    LVOT peak VTI 28 9 cm    Aortic valve peak velocity 1 78 m/s    Ao VTI 33 73 cm    AV peak gradient 13 mmHg    MV Peak E Jamar 87 cm/s    MV Peak A Jamar 1 03 m/s    RAA A4C 15 1 cm2    MV stenosis pressure 1/2 time 55 ms    LVSV, 2D 59 mL    DVI 0 79     LV EF 75     Est  RA pres 5 0 mmHg    Right Ventricular Peak Systolic Pressure 81 71 mmHg   Fingerstick Glucose (POCT)    Collection Time: 03/21/23  5:04 PM   Result Value Ref Range    POC Glucose 183 (H) 65 - 140 mg/dl   CBC and differential    Collection Time: 03/22/23  4:43 AM   Result Value Ref Range    WBC 12 13 (H) 4 31 - 10 16 Thousand/uL    RBC 4 28 3 88 - 5 62 Million/uL    Hemoglobin 12 9 12 0 - 17 0 g/dL    Hematocrit 38 8 36 5 - 49 3 %    MCV 91 82 - 98 fL    MCH 30 1 26 8 - 34 3 pg    MCHC 33 2 31 4 - 37 4 g/dL    RDW 12 4 11 6 - 15 1 %    MPV 10 3 8 9 - 12 7 fL    Platelets 671 571 - 053 Thousands/uL    nRBC 0 /100 WBCs    Neutrophils Relative 89 (H) 43 - 75 %    Immat GRANS % 1 0 - 2 %    Lymphocytes Relative 6 (L) 14 - 44 %    Monocytes Relative 4 4 - 12 %    Eosinophils Relative 0 0 - 6 %    Basophils Relative 0 0 - 1 %    Neutrophils Absolute 10 74 (H) 1 85 - 7 62 Thousands/µL    Immature Grans Absolute 0 14 0 00 - 0 20 Thousand/uL    Lymphocytes Absolute 0 76 0 60 - 4 47 Thousands/µL    Monocytes Absolute 0 47 0 17 - 1 22 Thousand/µL    Eosinophils Absolute 0 00 0 00 - 0 61 Thousand/µL    Basophils Absolute 0 02 0 00 - 0 10 Thousands/µL   Fingerstick Glucose (POCT)    Collection Time: 03/22/23  6:47 AM   Result Value Ref Range    POC Glucose 120 65 - 140 mg/dl     Blood Culture: No results found for: BLOODCX,   Urinalysis:   Lab Results   Component Value Date    COLORU YELLOW 05/10/2018    CLARITYU CLEAR 05/10/2018    SPECGRAV 1 015 05/10/2018    PHUR 6 5 05/10/2018    LEUKOCYTESUR NEGATIVE 05/10/2018    NITRITE NEGATIVE 05/10/2018    PROTEINUA NEGATIVE 05/10/2018    GLUCOSEU NEGATIVE 05/10/2018    KETONESU NEGATIVE 05/10/2018    BILIRUBINUR NEGATIVE 05/10/2018    BLOODU NEGATIVE 05/10/2018   ,   Urine Culture: No results found for: URINECX,   Wound Culure: No results found for: WOUNDCULT      Imaging:    CTA "head and neck with and without contrast    Result Date: 3/19/2023  Right frontal lobe cystic lesion with surrounding vasogenic edema most consistent with metastatic disease given findings described below  Local mass effect on the right lateral ventricle without significant midline shift  Contrast-enhanced MRI of the brain recommended for further evaluation  Right upper lobe mass with partially imaged right hilar adenopathy  Dedicated CT of the chest, abdomen and pelvis is recommended for further evaluation  No evidence for high-grade stenosis, major branch vessel occlusion or vascular aneurysm of the cervical or intracranial vessels  I personally discussed this study with ESAU FANG on 3/19/2023 at 2:08 PM  Workstation performed: [id number]     XR chest 1 view portable    Result Date: 3/20/2023  No acute cardiopulmonary disease  Right upper lung mass  Workstation performed: [id number]     MRI brain w wo contrast    Result Date: 3/19/2023  Rim-enhancing 2 4 cm centrally necrotic metastatic lesion in the right frontal lobe with surrounding severe vasogenic edema  Mass effect in the right hemisphere with approximately 2 4 mm right to left midline shift  Small focus of anterior cortical frontal acute to subacute ischemia  No evidence of acute intracranial hemorrhage  Findings were marked as \"immediate\"in Epic and will now be related to the ordering physician or covering clinical team by the radiology liaison  Workstation performed: [id number]     CT chest abdomen pelvis w contrast    Result Date: 3/21/2023  3 8 x 3 5 cm right upper lobe lung mass with associated overlying pleural tag and adjacent to pleural thickening, this may be due to pleural reaction or mild pleural invasion No contralateral lung nodules or mass Right hilar lymphadenopathy    Small lower right paratracheal left paratracheal lymph node do not meet the criteria for pathologic enlargement on the bases of size or morphology There is no CT evidence " of for metastatic disease in the abdomen and pelvis No lytic destructive lesion in the visualized bony skeleton The study was marked in EPIC for significant notification  Workstation performed: [id number]     MRI Brain BT w wo Contrast    Result Date: 3/22/2023  Unchanged 2 5 cm right parasagittal frontal lobe vertex mass with central necrosis and marked perilesional vasogenic edema unchanged  Favor metastatic disease (given lung mass) over primary high-grade glioma  Unchanged small subacute infarct in right frontal lobe  Unchanged 0 2 cm leftward midline shift  No new acute intracranial abnormality   Workstation performed: [id number]        Current Facility-Administered Medications   Medication Dose Route Frequency   • amLODIPine (NORVASC) tablet 10 mg  10 mg Oral Daily   • atorvastatin (LIPITOR) tablet 40 mg  40 mg Oral After Dinner   • dexamethasone (DECADRON) injection 4 mg  4 mg Intravenous Q6H [address]   • enoxaparin (LOVENOX) subcutaneous injection 40 mg  40 mg Subcutaneous Daily   • levETIRAcetam (KEPPRA) 500 mg in sodium chloride 0 9 % 100 mL IVPB  500 mg Intravenous Q12H [address]   • losartan (COZAAR) tablet 50 mg  50 mg Oral Daily   • pantoprazole (PROTONIX) EC tablet 40 mg  40 mg Oral Early Morning             Jermaine Rivera DO   Family Medicine

## 2023-03-22 NOTE — ASSESSMENT & PLAN NOTE
POD#1 ight frontal CRANIOTOMY IMAGE-GUIDED FOR TUMOR (Dr Daniel Ceron, 3/23/2023)  · R frontal brain mass w/ surrounding vasogenic edema   · presented to the [facility] on 3/19 with progressively worsening LUE and LLE weakness  · concern for possible metastatic disease given noted RUL mass on CT c/a/p  · pt reports remote hx of prostate CA s/p prostatectomy several yrs ago and no further intervention or f/u since    Imaging:   · MRI brain w wo contrast 3/23/2023: Expected postsurgical change from right parietal craniotomy and resection of cystic mass in the frontoparietal region  Stable vasogenic edema surrounding the resection cavity  Right to left midline shift of 2 mm  Plan:   · Continue to closely monitor neuro exam at this time   · frequent neuro checks per primary team   · Repeat STAT CTH with any acute decline in GCS > 2pts or more   · Maintain normotensive BP goals, SBP < 160   · Continue keppra 500mg q 12hrs x 1 week for seizure ppx  · Continue decadron wean as ordered [id number]  · Hold all therapeutic doses of AC / AP therapy, continue to hold asa for at least 2 weeks  · DVT ppx: SCDs, okay to start lovenox this afternoon  · PT/OT when able  · Continue medical management and pain control per primary team   · Consider heme/onc and rad/onc consults given concern for lung primary disease with RUL mass noted   · Okay to transfer out of the unit today  · Social work following for assistance with dispo once medically stable/cleared     Neurosurgery will continue to closely follow  Please reach out with any further questions or concerns

## 2023-03-22 NOTE — ASSESSMENT & PLAN NOTE
- Per MRI 3/22; no increase of vasogenic edema   - Continue with Keppra and Steroid with taper per neurosurgery recommendations

## 2023-03-22 NOTE — ASSESSMENT & PLAN NOTE
R frontal brain mass w/ surrounding vasogenic edema   - presented to the [facility] on 3/19 with progressively worsening LUE and LLE weakness  - concern for possible metastatic disease given noted RUL mass on CT c/a/p   - pt reports remote hx of prostate CA s/p prostatectomy several yrs ago and no further intervention or f/u since    Imaging:   · 3/19/2023 MRI brain w/wo: Rim-enhancing 2 4 cm centrally necrotic metastatic lesion in the right frontal lobe with surrounding severe vasogenic edema  Mass effect in the right hemisphere with approximately 2 4 mm right to left midline shift  Small focus of anterior cortical frontal acute to subacute ischemia  No evidence of acute intracranial hemorrhage    Plan:   · Continue to closely monitor neuro exam at this time   · frequent neuro checks per primary team   · Repeat STAT CTH with any acute decline in GCS > 2pts or more   · Maintain normotensive BP goals, SBP < 160   · Plan for OR resection tomorrow with Dr Alvin Friedman   · repeat MRI brain with directional DTI sequence reviewed  · Pre-op orders placed   · NPO at midnight w/ initiation of gentle IVF   · Hold chem dvt ppx after today   · Pre-op labs to be completed   · Ancef to be given pre-op   · Orders for bath to be completed tonight and pre-op   · Continue keppra 500mg q 12hrs   · Continue decadron 4mg q 6hrs   · Continue to hold home ASA given possible need for neurosurgical intervention   · DVT ppx: SCDs, SQ Lovenox held for OR tomorrow   · Pain control per primary team   · PT/OT   · Continue medical management per primary team   · Consider heme/onc and rad/onc consults given concern for lung primary disease with RUL mass noted   · Social work following for assistance with dispo once medically stable/cleared     Neurosurgery will continue to closely follow  Please reach out with any further questions or concerns

## 2023-03-22 NOTE — CASE MANAGEMENT
Case Management Discharge Planning Note    Patient name Bridgette Bray  Location [address] [place] [address] 625/PPHP [id number] MRN [id number]  : 1955 Date 3/22/2023       Current Admission Date: 3/20/2023  Current Admission Diagnosis:Brain mass   Patient Active Problem List    Diagnosis Date Noted   • Lung nodule 2023   • Brain mass 2023   • Brain compression ([address] ) 2023   • Cerebral edema ([facility] [address] ) 2023   • Hypercholesterolemia 2021   • CVA (cerebral vascular accident) ([facility] [address] ) 2021   • Essential hypertension 2020   • Annual physical exam 2020   • Negative depression screening 2020   • Overweight (BMI 25 0-29 9) 2020      LOS (days): 2  Geometric Mean LOS (GMLOS) (days): 3 80  Days to GMLOS:2 7     OBJECTIVE:  Risk of Unplanned Readmission Score: 8 26         Current admission status: Inpatient   Preferred Pharmacy:    [address]  Phone: [phone] Fax: [phone]    Primary Care Provider: Lissa Mcgee DO    Primary Insurance: BLUE CROSS  Secondary Insurance: MEDICARE    DISCHARGE DETAILS:    Discharge planning discussed with[de-identified] Patient  Freedom of Choice: Yes  Comments - Freedom of Choice: Discussed FOC     Other Referral/Resources/Interventions Provided:  Interventions: Short Term Rehab  Referral Comments: Patient requesting referral to [facility], entered in [place]

## 2023-03-22 NOTE — UTILIZATION REVIEW
NOTIFICATION OF INPATIENT ADMISSION   AUTHORIZATION REQUEST   SERVICING FACILITY:   [facility]  Address: [address] [place] [address], [address] [place] [address]  Tax ID: [id number]  NPI: [id number] ATTENDING PROVIDER:  Attending Name and NPI#: Samantha Rosen Md [[id number]]  Address: [address] [place] [address], [address] [place] [address]  Phone: [phone]   ADMISSION INFORMATION:  Place of Service: [place] [address]  Place of Service Code: 21  Inpatient Admission Date/Time: 3/20/23 12:13 AM  Discharge Date/Time: No discharge date for patient encounter  Admitting Diagnosis Code/Description:  brain mass     UTILIZATION REVIEW CONTACT:  Ruchi Nuno Utilization   Network Utilization Review Department  Phone: [phone]  Fax: [phone]  Email: [email or web address]  Contact for approvals/pending authorizations, clinical reviews, and discharge  PHYSICIAN ADVISORY SERVICES:  Medical Necessity Denial & Iyjx-mt-Eehc Review  Phone: [phone]  Fax: [phone]  Email: [email or web address]

## 2023-03-22 NOTE — PROGRESS NOTES
[address] [facility]  Progress Note - Oliverio Husbands 1955, 69 y o  male MRN: [id number]  Unit/Bed#: [facility] 625-01 Encounter: [id number]  Primary Care Provider: Layla Robles DO   Date and time admitted to hospital: 3/20/2023 12:13 AM    * Brain mass  Assessment & Plan  R frontal brain mass w/ surrounding vasogenic edema   - presented to the [facility] ED on 3/19 with progressively worsening LUE and LLE weakness  - concern for possible metastatic disease given noted RUL mass on CT c/a/p   - pt reports remote hx of prostate CA s/p prostatectomy several yrs ago and no further intervention or f/u since    Imaging:   · 3/19/2023 MRI brain w/wo: Rim-enhancing 2 4 cm centrally necrotic metastatic lesion in the right frontal lobe with surrounding severe vasogenic edema  Mass effect in the right hemisphere with approximately 2 4 mm right to left midline shift  Small focus of anterior cortical frontal acute to subacute ischemia   No evidence of acute intracranial hemorrhage    Plan:   · Continue to closely monitor neuro exam at this time   · frequent neuro checks per primary team   · Repeat STAT CTH with any acute decline in GCS > 2pts or more   · Maintain normotensive BP goals, SBP < 160   · Plan for OR tomorrow for resection of brain mass with Dr Elia Leach   · repeat MRI brain with directional DTI sequence reviewed  · Pre-op orders placed   · NPO at midnight w/ initiation of gentle IVF   · Hold chem dvt ppx after today   · Pre-op labs to be completed   · Ancef to be given pre-op   · Orders for bath to be completed tonight and pre-op   · Continue keppra 500mg q 12hrs   · Continue decadron 4mg q 6hrs   · Continue to hold home ASA given possible need for neurosurgical intervention   · DVT ppx: SCDs, SQ Lovenox held for OR tomorrow   · Pain control per primary team   · PT/OT   · Continue medical management per primary team   · Consider heme/onc and rad/onc consults given concern for lung primary disease "with RUL mass noted   · Social work following for assistance with dispo once medically stable/cleared     Neurosurgery will continue to closely follow  Please reach out with any further questions or concerns  Brain compression [facility])  Assessment & Plan  - see plan as further documented above     Cerebral edema [facility])  Assessment & Plan  - see plan as further documented above       Subjective/Objective   Chief Complaint: \"I am nervous but okay, ready for surgery\"     Subjective: Pt seen and examined this am on rounds  NAEO  Pt and wife are agreeable to surgery tomorrow  Consent for procedure was signed today at the bedside  All questions answered  Pt continued to deny any headaches, dizziness, N/V, increased weakness or numbness  In fact, pt continues to endorse that he feels his weakness in the L sided has improved since being admitted and started on steroids  Objective: well appearing, no acute distress     I/O       03/20 0701  03/21 0700 03/21 0701  03/22 0700 03/22 0701  03/23 0700    P  O  585 477     Total Intake(mL/kg) 585 (7 1) 477 (5 8)     Net +585 +477            Unmeasured Urine Occurrence 3 x 1 x     Unmeasured Stool Occurrence 0 x          Invasive Devices     Peripheral Intravenous Line  Duration           Peripheral IV 03/19/23 Proximal;Right;Ventral (anterior) Forearm 3 days              Physical Exam:  Vitals: Blood pressure 154/100, pulse 66, temperature 97 5 °F (36 4 °C), resp  rate 20, height 5' 9\" (1 753 m), weight 82 6 kg (182 lb), SpO2 95 %  ,Body mass index is 26 88 kg/m²      General appearance: alert, appears stated age, cooperative and no distress  Head: Normocephalic, without obvious abnormality, atraumatic  Eyes: EOMI, PERRL  Neck: supple, symmetrical, trachea midline and NT  Back: no kyphosis present, no tenderness to percussion or palpation  Lungs: non labored breathing, no resp distress on room air   Heart: regular heart rate  Neurologic:   Mental status: Alert, oriented x3, " thought content appropriate  Cranial nerves: grossly intact (Cranial nerves II-XII)  Sensory: normal to l;igth touch franco   Motor:  - L sided weakness appreciated  - LUE and LLE rated 4/5 throughout   - RUE and RLE intact   Reflexes: 2+ and symmetric  Coordination: drift appreciated to LUE, decreased dexterity noted to franco UE, L > R       Lab Results:  Results from last 7 days   Lab Units 03/22/23  0443 03/21/23  0628 03/20/23  0136 03/19/23  1148   WBC Thousand/uL 12 13* 12 31*  --  8 32   HEMOGLOBIN g/dL 12 9 13 1  --  14 8   HEMATOCRIT % 38 8 39 7  --  44 5   PLATELETS Thousands/uL 310 291 287 316   NEUTROS PCT % 89*  --   --   --    MONOS PCT % 4  --   --   --      Results from last 7 days   Lab Units 03/21/23  0628 03/19/23  1148   POTASSIUM mmol/L 3 8 3 8   CHLORIDE mmol/L 111* 105   CO2 mmol/L 25 26   BUN mg/dL 14 15   CREATININE mg/dL 0 74 0 91   CALCIUM mg/dL 8 9 9 6             Results from last 7 days   Lab Units 03/19/23  1148   INR  0 96   PTT seconds 27     No results found for: TROPONINT  ABG:No results found for: PHART, KVW5MNO, PO2ART, ZCT4ROH, D8AFETHB, BEART, SOURCE    Imaging Studies: I have personally reviewed pertinent reports  and I have personally reviewed pertinent films in PACS    NM bone scan whole body    Result Date: 3/22/2023  Impression: 1  No focal tracer activity characteristic of osseous metastatic disease  Workstation performed: [id number]     CT chest abdomen pelvis w contrast    Result Date: 3/21/2023  Impression: 3 8 x 3 5 cm right upper lobe lung mass with associated overlying pleural tag and adjacent to pleural thickening, this may be due to pleural reaction or mild pleural invasion No contralateral lung nodules or mass Right hilar lymphadenopathy    Small lower right paratracheal left paratracheal lymph node do not meet the criteria for pathologic enlargement on the bases of size or morphology There is no CT evidence of for metastatic disease in the abdomen and pelvis No lytic destructive lesion in the visualized bony skeleton The study was marked in EPIC for significant notification  Workstation performed: [id number]     MRI Brain BT w wo Contrast    Result Date: 3/22/2023  Impression: Unchanged 2 5 cm right parasagittal frontal lobe vertex mass with central necrosis and marked perilesional vasogenic edema unchanged  Favor metastatic disease (given lung mass) over primary high-grade glioma  Unchanged small subacute infarct in right frontal lobe  Unchanged 0 2 cm leftward midline shift  No new acute intracranial abnormality  Workstation performed: [id number]       EKG, Pathology, and Other Studies: I have personally reviewed pertinent reports        VTE Pharmacologic Prophylaxis: Sequential compression device (Venodyne)  and Enoxaparin (Lovenox)    VTE Mechanical Prophylaxis: sequential compression device

## 2023-03-22 NOTE — QUICK NOTE
"Presurgical Evaluation    Subjective:      Patient ID: Jonathan Carlin is a 69 y o  male  HPI (Per HP note 3/20/2023)   \"67 y o  male with past medical history significant for CVA with mild residual L sided deficit and HTN presenting transfer from [facility] with new brain mass concerning for metastasis  Pt reports L sided weakness upper and lower progressing over the past week  He thinks it is worsening  Reports not being able to walk well/bend knees and decreased strength in LUE  Hx of stroke 11-12 years ago with mild residual L sided deficits, reports baseline is no weakness but some unusual sensation in fingers and toes  Otherwise he is normally able to function fully, ambulate without assistance and full use of L side  Pt reports no decrease in appetite, fatigue, weight loss  In fact he is gaining weight  Previously smoked 1PPD for 20 years, quit 30 years ago, found to have right frontal brain mass likely metastasized from new found right upper lung nodule\"    The following portions of the patient's history were reviewed and updated as appropriate: allergies, current medications, past family history, past medical history, past social history, past surgical history and problem list     Procedure date: March 23, 2023    Surgeon:  Dr Anila Najera  Planned procedure:  Right frontal CRANIOTOMY IMAGE-GUIDED FOR TUMOR   Diagnosis for procedure:  Right frontal brain mass     Prior anesthesia: Yes   General; Complications:  None / Tolerated well    CAD History: None   NOTE: Patient should see Cardiology if time available before surgery, and if appropriate      Pulmonary History: None    Renal history: None    Diabetes History:  None     Neurological History: CVA     On Immunosuppressant meds/biologics: No      Review of Systems      Current Facility-Administered Medications   Medication Dose Route Frequency Provider Last Rate Last Admin   • amLODIPine (NORVASC) tablet 10 mg  10 mg Oral Daily Beverley Walls MD   10 mg at " 23 0815   • atorvastatin (LIPITOR) tablet 40 mg  40 mg Oral After Sandra Manriquez and MD Olivia   40 mg at 23 1751   • dexamethasone (DECADRON) injection 4 mg  4 mg Intravenous Q6H [facility] Cassi Ward PA-C   4 mg at 23 0321   • enoxaparin (LOVENOX) subcutaneous injection 40 mg  40 mg Subcutaneous Daily Lucero Denis MD   40 mg at 23 0815   • levETIRAcetam (KEPPRA) 500 mg in sodium chloride 0 9 % 100 mL IVPB  500 mg Intravenous Q12H [facility] Lucero Denis  mL/hr at 23 0815 500 mg at 23 0815   • losartan (COZAAR) tablet 50 mg  50 mg Oral Daily Lucero Denis MD   50 mg at 23 0815   • pantoprazole (PROTONIX) EC tablet 40 mg  40 mg Oral Early Morning Via Carlota Small MD   40 mg at 23 8236       Allergies on file:   Patient has no known allergies  Patient Active Problem List   Diagnosis   • Negative depression screening   • Overweight (BMI 25 0-29  9)   • Essential hypertension   • Annual physical exam   • CVA (cerebral vascular accident) ([facility] [address] )   • Hypercholesterolemia   • Brain mass   • Brain compression ([facility] [address] )   • Cerebral edema (HCC)   • Lung nodule        Past Medical History:   Diagnosis Date   • Hypertension    • Prostate cancer ([facility] [address] )    • Rectal bleeding    • Stroke [facility])              Past Surgical History:   Procedure Laterality Date   • COLONOSCOPY     • PROSTATECTOMY     • TONSILLECTOMY         Family History   Problem Relation Age of Onset   • Dementia Mother            • Breast cancer Sister            • Prostate cancer Brother         Received Radiation       Social History     Tobacco Use   • Smoking status: Former     Packs/day: 1 00     Years: 15 00     Pack years: 15 00     Types: Cigarettes     Passive exposure: Never   • Smokeless tobacco: Never   • Tobacco comments:     i quit when i was 25  not sure of exact dates   Vaping Use   • Vaping Use: Never used   Substance Use Topics   • Alcohol use:  Yes Alcohol/week: 6 0 standard drinks     Types: 6 Cans of beer per week     Comment: does not drink every day   • Drug use: Not Currently     Types: Marijuana       Objective:    Vitals:    03/21/23 2218 03/22/23 0303 03/22/23 0719 03/22/23 0726   BP: 141/89 151/80 (!) 165/104 154/100   Pulse: 72 58 65 66   Resp: 18  20    Temp: 98 °F (36 7 °C) 98 1 °F (36 7 °C) 97 5 °F (36 4 °C)    TempSrc:       SpO2: 96% 97% 95% 95%   Weight:       Height:            Physical Exam      Preop labs/testing available and reviewed: yes    eGFR   Date Value Ref Range Status   03/21/2023 94 ml/min/1 73sq m Final     WBC   Date Value Ref Range Status   03/22/2023 12 13 (H) 4 31 - 10 16 Thousand/uL Final     INR   Date Value Ref Range Status   03/19/2023 0 96 0 84 - 1 19 Final       EKG yes    Echo yes    Stress test/cath no    PFT/[place] no    Functional capacity: Climb stairs                        4 Mets   Pick the highest level patient can comfortably perform   4 mets or greater for surgery    RCRI  High Risk surgery? 1 Point  CAD History:         1 Point   MI; Positive Stress Test; CP due to Mi;  Nitrate Usage to control Angina; Pathologic Q wave on EKG  CHF Active:         1 Point   Pulm Edema; Paroxysmal Nocturnal Dyspnea;  Bibasilar Rales (crackles);S3; CHF on CXR  Cerebrovascular Disease (TIA or CVA):     1 Point  DM on Insulin:        1 Point  Serum Creat >2 0 mg/dl:       1 Point          Total Points: 2     Scoring: Class III Risk (10 1% risk)       KIET Risk:  GFR:   eGFR   Date Value Ref Range Status   03/21/2023 94 ml/min/1 73sq m Final         For PCP:  If GFR>60, Hold ACE/ARB/Diuretic on the day of surgery, and NSAIDS 10 days before  If GFR<45, Consider PRE and POST op Nephrology Consult  If 46 <GFR> 59 : Has Patient had KIET in last 6 Months? no   If YES: Preop Nephrology consult   If No:  [address] Nephrology consult             Assessment/Plan:    Patient is medically optimized for the planned "procedure  Further testing/evaluation is not required  Postop concerns: yes    Problem List Items Addressed This Visit        Cardiovascular and Mediastinum    CVA (cerebral vascular accident) ([facility] [address] )     Hx CVA 11-12 years ago with mild residual L sided deficits, baseline fully functional motor  - will hold aspirin 81 mg for scheduled surgery 3/23/2023         Relevant Orders    Inpatient consult to Neurology (Completed)       Nervous and Auditory    Brain compression [facility])    Relevant Orders    Case request operating room: Right frontal CRANIOTOMY IMAGE-GUIDED FOR TUMOR (Completed)    Cerebral edema ([address] )     - Per MRI 3/22; no increase of vasogenic edema   - Continue with Keppra and Steroid at current dosage          Relevant Orders    Case request operating room: Right frontal CRANIOTOMY IMAGE-GUIDED FOR TUMOR (Completed)       Other    * (Principal) Brain mass - Primary     Presenting with 1 week progressive L sided weakness upper and lower extremities   CTA head and neck 3/19/23 \"Right frontal lobe cystic lesion with surrounding vasogenic edema most consistent with metastatic disease given findings described below  Local mass effect on the right lateral ventricle without significant midline shift  Right upper lobe mass with partially imaged right hilar adenopathy  \"  MRI brain w wo contrast 3/19/23 \"Rim-enhancing 2 4 cm centrally necrotic metastatic lesion in the right frontal lobe with surrounding severe vasogenic edema  Mass effect in the right hemisphere with approximately 2 4 mm right to left midline shift  Small focus of anterior cortical frontal acute to subacute ischemia  No evidence of acute intracranial hemorrhage  \"  Given findings, transfer from [place] after discussion with neurocritical and NSGY for biopsy   Pt had leukocytosis on 03/21 of 12 31 likely 2/2 to steroids     3/22/2023: Repeat Brain MRI with DTI: no interval changes, stable known brain mass with high suspicion of metastatic disease " "(Lung)   3/21/2023: TTE EF 75%, unremarkable finding   3/22/2023: CT A/P: Besides the known lung mass seen on previous exam, no abdominal/ pelvis metastasis observed    Plan:   - continue decadron 4mg q6h, keppra 500 mg q12h, Protonix 40 mg for ulcer ppx  - neurology, pulm, hem/onc and NSGY consulted, appreciate recommendations:  - Per Shalini Garcia will plan for OR 3/23/2023 for resection of brain mass  Holding ASA  NPO at mid night   - Continue atorvastatin 40 mg daily in the evening given CVA hx    - counseled patients on risks vs benefits of holding aspirin  Pt in agreement with holding ASA for possible biopsy/resection of mass later this week  - hem/onc consulted for management of metastatic malignancy, appreciate recommendations   - Will f/u with morning CBC w/ diff  - Will monitor fingerstick glucose   - PT/OT eval when appropriate  Will likely need SNF placement on dc            Relevant Orders    Inpatient consult to Neurosurgery (Completed)    Inpatient consult to Neurology (Completed)    Case request operating room: Right frontal CRANIOTOMY IMAGE-GUIDED FOR TUMOR (Completed)    Inpatient consult to Oncology (Completed)   Other Visit Diagnoses     Lung mass        Relevant Orders    Inpatient consult to Pulmonology (Completed)    Inpatient consult to Oncology (Completed)                 Pre-Surgery Instructions:   Medication Instructions   • amLODIPine (NORVASC) 10 mg tablet per anesthesia guidelines    • aspirin (ECOTRIN LOW STRENGTH) 81 mg EC tablet per anesthesia guidelines    • losartan (COZAAR) 50 mg tablet per anesthesia guidelines         NOTE: Please use the above to review important meds for your specialty, the remainder \"per anesthesia Guidelines  \"    NOTE: Please place an Inbasket message for \"SOC\" pool for complicated patients        "

## 2023-03-22 NOTE — RESTORATIVE TECHNICIAN NOTE
Restorative Technician Note      Patient Name: Roberta Lozano     Restorative Tech Visit Date: 03/22/23  Note Type: Mobility  Patient Position Upon Consult: Seated edge of bed  Activity Performed: Ambulated  Assistive Device: Roller walker  Patient Position at End of Consult: Seated edge of bed;  All needs within reach    Domnick Burkitt, Restorative Technician

## 2023-03-22 NOTE — PLAN OF CARE
Problem: MOBILITY - ADULT  Goal: Maintain or return to baseline ADL function  Description: INTERVENTIONS:  -  Assess patient's ability to carry out ADLs; assess patient's baseline for ADL function and identify physical deficits which impact ability to perform ADLs (bathing, care of mouth/teeth, toileting, grooming, dressing, etc )  - Assess/evaluate cause of self-care deficits   - Assess range of motion  - Assess patient's mobility; develop plan if impaired  - Assess patient's need for assistive devices and provide as appropriate  - Encourage maximum independence but intervene and supervise when necessary  - Involve family in performance of ADLs  - Assess for home care needs following discharge   - Consider OT consult to assist with ADL evaluation and planning for discharge  - Provide patient education as appropriate  Outcome: Progressing  Goal: Maintains/Returns to pre admission functional level  Description: INTERVENTIONS:  - Perform BMAT or MOVE assessment daily    - Set and communicate daily mobility goal to care team and patient/family/caregiver  - Collaborate with rehabilitation services on mobility goals if consulted  - Perform Range of Motion 3 times a day  - Reposition patient every 2 hours    - Dangle patient 3 times a day  - Stand patient 3 times a day  - Ambulate patient 3 times a day  - Out of bed to chair 3 times a day   - Out of bed for meals 3 times a day  - Out of bed for toileting  - Record patient progress and toleration of activity level   Outcome: Progressing     Problem: PAIN - ADULT  Goal: Verbalizes/displays adequate comfort level or baseline comfort level  Description: Interventions:  - Encourage patient to monitor pain and request assistance  - Assess pain using appropriate pain scale  - Administer analgesics based on type and severity of pain and evaluate response  - Implement non-pharmacological measures as appropriate and evaluate response  - Consider cultural and social influences on pain and pain management  - Notify physician/advanced practitioner if interventions unsuccessful or patient reports new pain  Outcome: Progressing     Problem: INFECTION - ADULT  Goal: Absence or prevention of progression during hospitalization  Description: INTERVENTIONS:  - Assess and monitor for signs and symptoms of infection  - Monitor lab/diagnostic results  - Monitor all insertion sites, i e  indwelling lines, tubes, and drains  - Monitor endotracheal if appropriate and nasal secretions for changes in amount and color  - [place] appropriate cooling/warming therapies per order  - Administer medications as ordered  - Instruct and encourage patient and family to use good hand hygiene technique  - Identify and instruct in appropriate isolation precautions for identified infection/condition  Outcome: Progressing  Goal: Absence of fever/infection during neutropenic period  Description: INTERVENTIONS:  - Monitor WBC    Outcome: Progressing     Problem: SAFETY ADULT  Goal: Maintain or return to baseline ADL function  Description: INTERVENTIONS:  -  Assess patient's ability to carry out ADLs; assess patient's baseline for ADL function and identify physical deficits which impact ability to perform ADLs (bathing, care of mouth/teeth, toileting, grooming, dressing, etc )  - Assess/evaluate cause of self-care deficits   - Assess range of motion  - Assess patient's mobility; develop plan if impaired  - Assess patient's need for assistive devices and provide as appropriate  - Encourage maximum independence but intervene and supervise when necessary  - Involve family in performance of ADLs  - Assess for home care needs following discharge   - Consider OT consult to assist with ADL evaluation and planning for discharge  - Provide patient education as appropriate  Outcome: Progressing  Goal: Maintains/Returns to pre admission functional level  Description: INTERVENTIONS:  - Perform BMAT or MOVE assessment daily    - Set and communicate daily mobility goal to care team and patient/family/caregiver  - Collaborate with rehabilitation services on mobility goals if consulted  - Perform Range of Motion 3 times a day  - Reposition patient every 2 hours    - Dangle patient 3 times a day  - Stand patient 3 times a day  - Ambulate patient 3 times a day  - Out of bed to chair 3 times a day   - Out of bed for meals 3 times a day  - Out of bed for toileting  - Record patient progress and toleration of activity level   Outcome: Progressing  Goal: Patient will remain free of falls  Description: INTERVENTIONS:  - Educate patient/family on patient safety including physical limitations  - Instruct patient to call for assistance with activity   - Consult OT/PT to assist with strengthening/mobility   - Keep Call bell within reach  - Keep bed low and locked with side rails adjusted as appropriate  - Keep care items and personal belongings within reach  - Initiate and maintain comfort rounds  - Make Fall Risk Sign visible to staff  - Offer Toileting every 3 Hours, in advance of need  - Initiate/Maintain alarm  - Obtain necessary fall risk management equipment:   - Apply yellow socks and bracelet for high fall risk patients  - Consider moving patient to room near nurses station  Outcome: Progressing

## 2023-03-22 NOTE — UTILIZATION REVIEW
Continued Stay Review    Date: 3-                        Current Patient Class: inpatient  Current Level of Care: med/surg  HPI:67 y o  male initially admitted on 3/20 obs to inpatient 3/21 with  R frontal brain mass w/ surrounding vasogenic edema     Assessment/Plan:  Plan for OR tomorrow for resection of brain mass with NSx  Npo after MN with initiation of gentle IVFs  Hold Lovenox in AM, continue SCDs  Pre-op labs in AM  Ancef pre-op  Continue Keppra, and decadron  Continue to hold ASA  Continue pain control prn         Vital Signs:   Date/Time Temp Pulse Resp BP MAP (mmHg) SpO2 O2 Device Patient Position - Orthostatic VS   03/22/23 0726 -- 66 -- 154/100 118 95 % None (Room air) --   03/22/23 07:19:07 97 5 °F (36 4 °C) 65 20 165/104 Abnormal  124 95 % -- --   03/22/23 03:03:15 98 1 °F (36 7 °C) 58 -- 151/80 104 97 % -- --   03/21/23 22:18:41 98 °F (36 7 °C) 72 18 141/89 106 96 % -- --   03/21/23 15:04:22 98 5 °F (36 9 °C) 70 -- 139/89 106 95 % -- --       Pertinent Labs/Diagnostic Results:     Results from last 7 days   Lab Units 03/22/23  0443 03/21/23  0628 03/20/23  0136 03/19/23  1148   WBC Thousand/uL 12 13* 12 31*  --  8 32   HEMOGLOBIN g/dL 12 9 13 1  --  14 8   HEMATOCRIT % 38 8 39 7  --  44 5   PLATELETS Thousands/uL 310 291 287 316   NEUTROS ABS Thousands/µL 10 74*  --   --   --      Results from last 7 days   Lab Units 03/21/23  0628 03/19/23  1148   SODIUM mmol/L 139 140   POTASSIUM mmol/L 3 8 3 8   CHLORIDE mmol/L 111* 105   CO2 mmol/L 25 26   ANION GAP mmol/L 3* 9   BUN mg/dL 14 15   CREATININE mg/dL 0 74 0 91   EGFR ml/min/1 73sq m 94 85   CALCIUM mg/dL 8 9 9 6     Results from last 7 days   Lab Units 03/22/23  1136 03/22/23  0647 03/21/23  1704 03/21/23  1104 03/20/23  2050 03/20/23  1629 03/20/23  1212 03/19/23  1204   POC GLUCOSE mg/dl 183* 120 183* 233* 167* 146* 98 88     Results from last 7 days   Lab Units 03/21/23  0628 03/19/23  1148   GLUCOSE RANDOM mg/dL 152* 103 Medications:   Scheduled Medications:  amLODIPine, 10 mg, Oral, Daily  atorvastatin, 40 mg, Oral, After Dinner  [START ON 3/23/2023] cefazolin, 2,000 mg, Intravenous, Once  chlorhexidine, 15 mL, Swish & Spit, Once  dexamethasone, 4 mg, Intravenous, Q6H LEN  levETIRAcetam, 500 mg, Intravenous, Q12H LEN  losartan, 50 mg, Oral, Daily  pantoprazole, 40 mg, Oral, Early Morning    Continuous IV Infusions:  sodium chloride, 125 mL/hr, Intravenous, Continuous      Discharge Plan: TBD    Network Utilization Review Department  ATTENTION: Please call with any questions or concerns to [phone] and carefully listen to the prompts so that you are directed to the right person  All voicemails are confidential   Gayatri Doran all requests for admission clinical reviews, approved or denied determinations and any other requests to dedicated fax number below belonging to the campus where the patient is receiving treatment   List of dedicated fax numbers for the Facilities:  [address] DENIALS (Administrative/Medical Necessity) [phone]   [address] (Maternity/NICU/Pediatrics) [phone]   913 Geraldine Davis [phone]   [place] [address] [phone]   [address] [place] [address] Johana Tilley 28 [phone]   1553 [facility] [address] [phone]

## 2023-03-23 ENCOUNTER — ANESTHESIA (INPATIENT)
Dept: PERIOP | Facility: HOSPITAL | Age: 68
End: 2023-03-23

## 2023-03-23 ENCOUNTER — APPOINTMENT (OUTPATIENT)
Dept: RADIOLOGY | Facility: HOSPITAL | Age: 68
End: 2023-03-23

## 2023-03-23 ENCOUNTER — ANESTHESIA EVENT (INPATIENT)
Dept: PERIOP | Facility: HOSPITAL | Age: 68
End: 2023-03-23

## 2023-03-23 LAB
ABO GROUP BLD: NORMAL
ALBUMIN SERPL BCP-MCNC: 3.7 G/DL (ref 3.5–5)
ALP SERPL-CCNC: 82 U/L (ref 46–116)
ALT SERPL W P-5'-P-CCNC: 15 U/L (ref 12–78)
ANION GAP SERPL CALCULATED.3IONS-SCNC: 3 MMOL/L (ref 4–13)
APTT PPP: 24 SECONDS (ref 23–37)
AST SERPL W P-5'-P-CCNC: 10 U/L (ref 5–45)
BASOPHILS # BLD MANUAL: 0 THOUSAND/UL (ref 0–0.1)
BASOPHILS NFR MAR MANUAL: 0 % (ref 0–1)
BILIRUB SERPL-MCNC: 0.3 MG/DL (ref 0.2–1)
BUN SERPL-MCNC: 18 MG/DL (ref 5–25)
CALCIUM SERPL-MCNC: 8.5 MG/DL (ref 8.3–10.1)
CHLORIDE SERPL-SCNC: 110 MMOL/L (ref 96–108)
CO2 SERPL-SCNC: 26 MMOL/L (ref 21–32)
CREAT SERPL-MCNC: 0.76 MG/DL (ref 0.6–1.3)
EOSINOPHIL # BLD MANUAL: 0 THOUSAND/UL (ref 0–0.4)
EOSINOPHIL NFR BLD MANUAL: 0 % (ref 0–6)
ERYTHROCYTE [DISTWIDTH] IN BLOOD BY AUTOMATED COUNT: 12.3 % (ref 11.6–15.1)
GFR SERPL CREATININE-BSD FRML MDRD: 93 ML/MIN/1.73SQ M
GLUCOSE SERPL-MCNC: 139 MG/DL (ref 65–140)
GLUCOSE SERPL-MCNC: 140 MG/DL (ref 65–140)
GLUCOSE SERPL-MCNC: 164 MG/DL (ref 65–140)
HCT VFR BLD AUTO: 42.7 % (ref 36.5–49.3)
HGB BLD-MCNC: 14.1 G/DL (ref 12–17)
INR PPP: 1 (ref 0.84–1.19)
LYMPHOCYTES # BLD AUTO: 1.06 THOUSAND/UL (ref 0.6–4.47)
LYMPHOCYTES # BLD AUTO: 8 % (ref 14–44)
MCH RBC QN AUTO: 29.7 PG (ref 26.8–34.3)
MCHC RBC AUTO-ENTMCNC: 33 G/DL (ref 31.4–37.4)
MCV RBC AUTO: 90 FL (ref 82–98)
MONOCYTES # BLD AUTO: 0.66 THOUSAND/UL (ref 0–1.22)
MONOCYTES NFR BLD: 5 % (ref 4–12)
MRSA NOSE QL CULT: NORMAL
NEUTROPHILS # BLD MANUAL: 11.56 THOUSAND/UL (ref 1.85–7.62)
NEUTS BAND NFR BLD MANUAL: 1 % (ref 0–8)
NEUTS SEG NFR BLD AUTO: 86 % (ref 43–75)
PLATELET # BLD AUTO: 319 THOUSANDS/UL (ref 149–390)
PLATELET BLD QL SMEAR: ADEQUATE
PMV BLD AUTO: 9.9 FL (ref 8.9–12.7)
POLYCHROMASIA BLD QL SMEAR: PRESENT
POTASSIUM SERPL-SCNC: 4 MMOL/L (ref 3.5–5.3)
PROT SERPL-MCNC: 7.4 G/DL (ref 6.4–8.4)
PROTHROMBIN TIME: 13.4 SECONDS (ref 11.6–14.5)
RBC # BLD AUTO: 4.75 MILLION/UL (ref 3.88–5.62)
RBC MORPH BLD: PRESENT
RH BLD: POSITIVE
SODIUM SERPL-SCNC: 139 MMOL/L (ref 135–147)
WBC # BLD AUTO: 13.29 THOUSAND/UL (ref 4.31–10.16)

## 2023-03-23 PROCEDURE — 8E09XBZ COMPUTER ASSISTED PROCEDURE OF HEAD AND NECK REGION: ICD-10-PCS | Performed by: NEUROLOGICAL SURGERY

## 2023-03-23 PROCEDURE — 00B70ZX EXCISION OF CEREBRAL HEMISPHERE, OPEN APPROACH, DIAGNOSTIC: ICD-10-PCS | Performed by: NEUROLOGICAL SURGERY

## 2023-03-23 PROCEDURE — 4A11X4G MONITORING OF PERIPHERAL NERVOUS ELECTRICAL ACTIVITY, INTRAOPERATIVE, EXTERNAL APPROACH: ICD-10-PCS | Performed by: NEUROLOGICAL SURGERY

## 2023-03-23 PROCEDURE — 0T9B80Z DRAINAGE OF BLADDER WITH DRAINAGE DEVICE, VIA NATURAL OR ARTIFICIAL OPENING ENDOSCOPIC: ICD-10-PCS | Performed by: UROLOGY

## 2023-03-23 DEVICE — DURA 62106 SUBSTITUTE DUREPAIR 1X3IN NCE
Type: IMPLANTABLE DEVICE | Site: FRONTAL LOBE | Status: FUNCTIONAL
Brand: DUREPAIR®

## 2023-03-23 DEVICE — IMPLANTABLE DEVICE
Type: IMPLANTABLE DEVICE | Site: CRANIAL | Status: FUNCTIONAL
Brand: THINFLAP SYSTEM

## 2023-03-23 RX ORDER — ACETAMINOPHEN 325 MG/1
975 TABLET ORAL EVERY 8 HOURS SCHEDULED
Status: DISCONTINUED | OUTPATIENT
Start: 2023-03-23 | End: 2023-03-23

## 2023-03-23 RX ORDER — LEVETIRACETAM 500 MG/1
500 TABLET ORAL EVERY 12 HOURS SCHEDULED
Status: COMPLETED | OUTPATIENT
Start: 2023-03-23 | End: 2023-03-30

## 2023-03-23 RX ORDER — HYDROMORPHONE HCL IN WATER/PF 6 MG/30 ML
0.2 PATIENT CONTROLLED ANALGESIA SYRINGE INTRAVENOUS
Status: DISCONTINUED | OUTPATIENT
Start: 2023-03-23 | End: 2023-03-23 | Stop reason: HOSPADM

## 2023-03-23 RX ORDER — GLYCOPYRROLATE 0.2 MG/ML
INJECTION INTRAMUSCULAR; INTRAVENOUS AS NEEDED
Status: DISCONTINUED | OUTPATIENT
Start: 2023-03-23 | End: 2023-03-23

## 2023-03-23 RX ORDER — DEXAMETHASONE SODIUM PHOSPHATE 10 MG/ML
INJECTION, SOLUTION INTRAMUSCULAR; INTRAVENOUS AS NEEDED
Status: DISCONTINUED | OUTPATIENT
Start: 2023-03-23 | End: 2023-03-23

## 2023-03-23 RX ORDER — SUCCINYLCHOLINE/SOD CL,ISO/PF 100 MG/5ML
SYRINGE (ML) INTRAVENOUS AS NEEDED
Status: DISCONTINUED | OUTPATIENT
Start: 2023-03-23 | End: 2023-03-23

## 2023-03-23 RX ORDER — ONDANSETRON 2 MG/ML
4 INJECTION INTRAMUSCULAR; INTRAVENOUS ONCE AS NEEDED
Status: DISCONTINUED | OUTPATIENT
Start: 2023-03-23 | End: 2023-03-23 | Stop reason: HOSPADM

## 2023-03-23 RX ORDER — LABETALOL HYDROCHLORIDE 5 MG/ML
INJECTION, SOLUTION INTRAVENOUS AS NEEDED
Status: DISCONTINUED | OUTPATIENT
Start: 2023-03-23 | End: 2023-03-23

## 2023-03-23 RX ORDER — DEXAMETHASONE 2 MG/1
2 TABLET ORAL EVERY 6 HOURS SCHEDULED
Status: COMPLETED | OUTPATIENT
Start: 2023-03-27 | End: 2023-03-29

## 2023-03-23 RX ORDER — SODIUM CHLORIDE 9 MG/ML
75 INJECTION, SOLUTION INTRAVENOUS CONTINUOUS
Status: DISCONTINUED | OUTPATIENT
Start: 2023-03-23 | End: 2023-03-24

## 2023-03-23 RX ORDER — LIDOCAINE HYDROCHLORIDE 10 MG/ML
INJECTION, SOLUTION EPIDURAL; INFILTRATION; INTRACAUDAL; PERINEURAL AS NEEDED
Status: DISCONTINUED | OUTPATIENT
Start: 2023-03-23 | End: 2023-03-23

## 2023-03-23 RX ORDER — OXYCODONE HYDROCHLORIDE 5 MG/1
5 TABLET ORAL EVERY 4 HOURS PRN
Status: DISCONTINUED | OUTPATIENT
Start: 2023-03-23 | End: 2023-03-30 | Stop reason: HOSPADM

## 2023-03-23 RX ORDER — DEXAMETHASONE 2 MG/1
2 TABLET ORAL EVERY 8 HOURS SCHEDULED
Status: DISCONTINUED | OUTPATIENT
Start: 2023-03-30 | End: 2023-03-30 | Stop reason: HOSPADM

## 2023-03-23 RX ORDER — SODIUM CHLORIDE 9 MG/ML
INJECTION, SOLUTION INTRAVENOUS CONTINUOUS PRN
Status: DISCONTINUED | OUTPATIENT
Start: 2023-03-23 | End: 2023-03-23

## 2023-03-23 RX ORDER — PROPOFOL 10 MG/ML
INJECTION, EMULSION INTRAVENOUS AS NEEDED
Status: DISCONTINUED | OUTPATIENT
Start: 2023-03-23 | End: 2023-03-23

## 2023-03-23 RX ORDER — DEXAMETHASONE 2 MG/1
2 TABLET ORAL
Status: DISCONTINUED | OUTPATIENT
Start: 2023-04-03 | End: 2023-03-30 | Stop reason: HOSPADM

## 2023-03-23 RX ORDER — CEFAZOLIN SODIUM 2 G/50ML
2000 SOLUTION INTRAVENOUS EVERY 8 HOURS
Status: COMPLETED | OUTPATIENT
Start: 2023-03-23 | End: 2023-03-24

## 2023-03-23 RX ORDER — DEXAMETHASONE 4 MG/1
4 TABLET ORAL EVERY 6 HOURS SCHEDULED
Status: COMPLETED | OUTPATIENT
Start: 2023-03-23 | End: 2023-03-25

## 2023-03-23 RX ORDER — MIDAZOLAM HYDROCHLORIDE 2 MG/2ML
INJECTION, SOLUTION INTRAMUSCULAR; INTRAVENOUS AS NEEDED
Status: DISCONTINUED | OUTPATIENT
Start: 2023-03-23 | End: 2023-03-23

## 2023-03-23 RX ORDER — SENNOSIDES 8.6 MG
1 TABLET ORAL DAILY
Status: DISCONTINUED | OUTPATIENT
Start: 2023-03-24 | End: 2023-03-30 | Stop reason: HOSPADM

## 2023-03-23 RX ORDER — ONDANSETRON 2 MG/ML
4 INJECTION INTRAMUSCULAR; INTRAVENOUS EVERY 6 HOURS PRN
Status: DISCONTINUED | OUTPATIENT
Start: 2023-03-23 | End: 2023-03-30 | Stop reason: HOSPADM

## 2023-03-23 RX ORDER — DIPHENHYDRAMINE HYDROCHLORIDE 50 MG/ML
12.5 INJECTION INTRAMUSCULAR; INTRAVENOUS ONCE AS NEEDED
Status: DISCONTINUED | OUTPATIENT
Start: 2023-03-23 | End: 2023-03-23 | Stop reason: HOSPADM

## 2023-03-23 RX ORDER — FENTANYL CITRATE/PF 50 MCG/ML
25 SYRINGE (ML) INJECTION
Status: DISCONTINUED | OUTPATIENT
Start: 2023-03-23 | End: 2023-03-23 | Stop reason: HOSPADM

## 2023-03-23 RX ORDER — BACITRACIN, NEOMYCIN, POLYMYXIN B 400; 3.5; 5 [USP'U]/G; MG/G; [USP'U]/G
OINTMENT TOPICAL AS NEEDED
Status: DISCONTINUED | OUTPATIENT
Start: 2023-03-23 | End: 2023-03-23 | Stop reason: HOSPADM

## 2023-03-23 RX ORDER — FENTANYL CITRATE 50 UG/ML
INJECTION, SOLUTION INTRAMUSCULAR; INTRAVENOUS AS NEEDED
Status: DISCONTINUED | OUTPATIENT
Start: 2023-03-23 | End: 2023-03-23

## 2023-03-23 RX ORDER — PROPOFOL 10 MG/ML
INJECTION, EMULSION INTRAVENOUS CONTINUOUS PRN
Status: DISCONTINUED | OUTPATIENT
Start: 2023-03-23 | End: 2023-03-23

## 2023-03-23 RX ORDER — ONDANSETRON 2 MG/ML
INJECTION INTRAMUSCULAR; INTRAVENOUS AS NEEDED
Status: DISCONTINUED | OUTPATIENT
Start: 2023-03-23 | End: 2023-03-23

## 2023-03-23 RX ORDER — ACETAMINOPHEN 325 MG/1
975 TABLET ORAL EVERY 8 HOURS PRN
Status: DISCONTINUED | OUTPATIENT
Start: 2023-03-23 | End: 2023-03-30 | Stop reason: HOSPADM

## 2023-03-23 RX ORDER — HYDROMORPHONE HCL IN WATER/PF 6 MG/30 ML
0.2 PATIENT CONTROLLED ANALGESIA SYRINGE INTRAVENOUS EVERY 4 HOURS PRN
Status: DISCONTINUED | OUTPATIENT
Start: 2023-03-23 | End: 2023-03-30 | Stop reason: HOSPADM

## 2023-03-23 RX ORDER — LIDOCAINE HYDROCHLORIDE 20 MG/ML
JELLY TOPICAL AS NEEDED
Status: DISCONTINUED | OUTPATIENT
Start: 2023-03-23 | End: 2023-03-23 | Stop reason: HOSPADM

## 2023-03-23 RX ORDER — OXYCODONE HYDROCHLORIDE 5 MG/1
2.5 TABLET ORAL EVERY 4 HOURS PRN
Status: DISCONTINUED | OUTPATIENT
Start: 2023-03-23 | End: 2023-03-30 | Stop reason: HOSPADM

## 2023-03-23 RX ORDER — MANNITOL 250 MG/ML
INJECTION, SOLUTION INTRAVENOUS AS NEEDED
Status: DISCONTINUED | OUTPATIENT
Start: 2023-03-23 | End: 2023-03-23

## 2023-03-23 RX ORDER — DOCUSATE SODIUM 100 MG/1
100 CAPSULE, LIQUID FILLED ORAL 2 TIMES DAILY
Status: DISCONTINUED | OUTPATIENT
Start: 2023-03-23 | End: 2023-03-30 | Stop reason: HOSPADM

## 2023-03-23 RX ORDER — BISACODYL 10 MG
10 SUPPOSITORY, RECTAL RECTAL DAILY PRN
Status: DISCONTINUED | OUTPATIENT
Start: 2023-03-23 | End: 2023-03-30 | Stop reason: HOSPADM

## 2023-03-23 RX ORDER — FUROSEMIDE 10 MG/ML
INJECTION INTRAMUSCULAR; INTRAVENOUS AS NEEDED
Status: DISCONTINUED | OUTPATIENT
Start: 2023-03-23 | End: 2023-03-23

## 2023-03-23 RX ORDER — LIDOCAINE HYDROCHLORIDE AND EPINEPHRINE 10; 10 MG/ML; UG/ML
INJECTION, SOLUTION INFILTRATION; PERINEURAL AS NEEDED
Status: DISCONTINUED | OUTPATIENT
Start: 2023-03-23 | End: 2023-03-23 | Stop reason: HOSPADM

## 2023-03-23 RX ORDER — DEXAMETHASONE 4 MG/1
4 TABLET ORAL EVERY 8 HOURS SCHEDULED
Status: COMPLETED | OUTPATIENT
Start: 2023-03-25 | End: 2023-03-27

## 2023-03-23 RX ORDER — DEXAMETHASONE 2 MG/1
2 TABLET ORAL EVERY 12 HOURS SCHEDULED
Status: DISCONTINUED | OUTPATIENT
Start: 2023-03-31 | End: 2023-03-30 | Stop reason: HOSPADM

## 2023-03-23 RX ADMIN — SODIUM CHLORIDE: 0.9 INJECTION, SOLUTION INTRAVENOUS at 11:05

## 2023-03-23 RX ADMIN — PHENYLEPHRINE HYDROCHLORIDE 20 MCG/MIN: 10 INJECTION INTRAVENOUS at 11:12

## 2023-03-23 RX ADMIN — FENTANYL CITRATE 50 MCG: 50 INJECTION, SOLUTION INTRAMUSCULAR; INTRAVENOUS at 11:10

## 2023-03-23 RX ADMIN — LEVETIRACETAM 500 MG: 100 INJECTION, SOLUTION INTRAVENOUS at 11:38

## 2023-03-23 RX ADMIN — Medication 100 MG: at 11:11

## 2023-03-23 RX ADMIN — DEXAMETHASONE SODIUM PHOSPHATE 10 MG: 10 INJECTION, SOLUTION INTRAMUSCULAR; INTRAVENOUS at 11:38

## 2023-03-23 RX ADMIN — LEVETIRACETAM 500 MG: 500 TABLET, FILM COATED ORAL at 20:23

## 2023-03-23 RX ADMIN — ONDANSETRON 4 MG: 2 INJECTION INTRAMUSCULAR; INTRAVENOUS at 11:05

## 2023-03-23 RX ADMIN — LOSARTAN POTASSIUM 50 MG: 50 TABLET, FILM COATED ORAL at 08:56

## 2023-03-23 RX ADMIN — PROPOFOL 50 MG: 10 INJECTION, EMULSION INTRAVENOUS at 11:11

## 2023-03-23 RX ADMIN — ACETAMINOPHEN 975 MG: 325 TABLET ORAL at 16:57

## 2023-03-23 RX ADMIN — PROPOFOL 100 MCG/KG/MIN: 10 INJECTION, EMULSION INTRAVENOUS at 11:12

## 2023-03-23 RX ADMIN — PROPOFOL 50 MG: 10 INJECTION, EMULSION INTRAVENOUS at 12:43

## 2023-03-23 RX ADMIN — FENTANYL CITRATE 50 MCG: 50 INJECTION, SOLUTION INTRAMUSCULAR; INTRAVENOUS at 14:52

## 2023-03-23 RX ADMIN — PROPOFOL 30 MG: 10 INJECTION, EMULSION INTRAVENOUS at 12:07

## 2023-03-23 RX ADMIN — DOCUSATE SODIUM 100 MG: 100 CAPSULE, LIQUID FILLED ORAL at 17:00

## 2023-03-23 RX ADMIN — PANTOPRAZOLE SODIUM 40 MG: 40 TABLET, DELAYED RELEASE ORAL at 05:28

## 2023-03-23 RX ADMIN — AMLODIPINE BESYLATE 10 MG: 10 TABLET ORAL at 08:56

## 2023-03-23 RX ADMIN — OXYCODONE HYDROCHLORIDE 2.5 MG: 5 TABLET ORAL at 19:24

## 2023-03-23 RX ADMIN — GADOBUTROL 8 ML: 604.72 INJECTION INTRAVENOUS at 22:13

## 2023-03-23 RX ADMIN — LIDOCAINE HYDROCHLORIDE 50 MG: 10 INJECTION, SOLUTION EPIDURAL; INFILTRATION; INTRACAUDAL; PERINEURAL at 11:10

## 2023-03-23 RX ADMIN — ATORVASTATIN CALCIUM 40 MG: 40 TABLET, FILM COATED ORAL at 17:00

## 2023-03-23 RX ADMIN — DEXAMETHASONE SODIUM PHOSPHATE 4 MG: 4 INJECTION INTRA-ARTICULAR; INTRALESIONAL; INTRAMUSCULAR; INTRAVENOUS; SOFT TISSUE at 05:28

## 2023-03-23 RX ADMIN — CHLORHEXIDINE GLUCONATE 0.12% ORAL RINSE 15 ML: 1.2 LIQUID ORAL at 05:28

## 2023-03-23 RX ADMIN — HYDROMORPHONE HYDROCHLORIDE 0.2 MG: 0.2 INJECTION, SOLUTION INTRAMUSCULAR; INTRAVENOUS; SUBCUTANEOUS at 22:43

## 2023-03-23 RX ADMIN — LABETALOL HYDROCHLORIDE 5 MG: 5 INJECTION, SOLUTION INTRAVENOUS at 14:47

## 2023-03-23 RX ADMIN — DEXAMETHASONE 4 MG: 4 TABLET ORAL at 23:39

## 2023-03-23 RX ADMIN — GLYCOPYRROLATE 0.2 MG: 0.2 INJECTION, SOLUTION INTRAMUSCULAR; INTRAVENOUS at 11:30

## 2023-03-23 RX ADMIN — FENTANYL CITRATE 50 MCG: 50 INJECTION, SOLUTION INTRAMUSCULAR; INTRAVENOUS at 14:47

## 2023-03-23 RX ADMIN — FENTANYL CITRATE 50 MCG: 50 INJECTION, SOLUTION INTRAMUSCULAR; INTRAVENOUS at 11:05

## 2023-03-23 RX ADMIN — REMIFENTANIL HYDROCHLORIDE 0.2 MCG/KG/MIN: 1 INJECTION, POWDER, LYOPHILIZED, FOR SOLUTION INTRAVENOUS at 11:12

## 2023-03-23 RX ADMIN — CEFAZOLIN SODIUM 2000 MG: 2 SOLUTION INTRAVENOUS at 20:23

## 2023-03-23 RX ADMIN — MANNITOL 40 G: 12.5 INJECTION, SOLUTION INTRAVENOUS at 12:40

## 2023-03-23 RX ADMIN — PROPOFOL 50 MG: 10 INJECTION, EMULSION INTRAVENOUS at 11:10

## 2023-03-23 RX ADMIN — MIDAZOLAM 2 MG: 1 INJECTION INTRAMUSCULAR; INTRAVENOUS at 11:05

## 2023-03-23 RX ADMIN — DEXAMETHASONE 4 MG: 4 TABLET ORAL at 17:00

## 2023-03-23 RX ADMIN — SODIUM CHLORIDE 75 ML/HR: 0.9 INJECTION, SOLUTION INTRAVENOUS at 17:05

## 2023-03-23 RX ADMIN — FUROSEMIDE 20 MG: 10 INJECTION, SOLUTION INTRAVENOUS at 12:38

## 2023-03-23 RX ADMIN — LEVETIRACETAM 500 MG: 100 INJECTION, SOLUTION INTRAVENOUS at 08:57

## 2023-03-23 RX ADMIN — CEFAZOLIN SODIUM 2000 MG: 2 SOLUTION INTRAVENOUS at 12:15

## 2023-03-23 RX ADMIN — SODIUM CHLORIDE 125 ML/HR: 0.9 INJECTION, SOLUTION INTRAVENOUS at 05:31

## 2023-03-23 RX ADMIN — LABETALOL HYDROCHLORIDE 10 MG: 5 INJECTION, SOLUTION INTRAVENOUS at 14:55

## 2023-03-23 RX ADMIN — LABETALOL HYDROCHLORIDE 5 MG: 5 INJECTION, SOLUTION INTRAVENOUS at 14:51

## 2023-03-23 NOTE — PROGRESS NOTES
"[facility]  Progress Notes - Family Medicine    Iliana Ac 1955, 69 y o  male  MRN: [id number]  Unit/Bed#: OR POOL Encounter: [id number]  Primary Care Provider: Queen Zana DO      Admission Date: 3/20/2023 0013  Length of Stay: 3 days  Code Status: Level 1 - Full Code  Diet: Diet NPO; Sips with meds    Consult:   IP CONSULT TO NEUROSURGERY  IP CONSULT TO NEUROLOGY  IP CONSULT TO PULMONOLOGY  IP CONSULT TO ONCOLOGY    Assessments & Plans:     * Brain mass  Assessment & Plan  Presenting with 1 week progressive L sided weakness upper and lower extremities   CTA head and neck 3/19/23 \"Right frontal lobe cystic lesion with surrounding vasogenic edema most consistent with metastatic disease given findings described below  Local mass effect on the right lateral ventricle without significant midline shift  Right upper lobe mass with partially imaged right hilar adenopathy  \"  MRI brain w wo contrast 3/19/23 \"Rim-enhancing 2 4 cm centrally necrotic metastatic lesion in the right frontal lobe with surrounding severe vasogenic edema  Mass effect in the right hemisphere with approximately 2 4 mm right to left midline shift  Small focus of anterior cortical frontal acute to subacute ischemia  No evidence of acute intracranial hemorrhage  \"  Given findings, transfer from [place] after discussion with neurocritical and NSGY for biopsy   Pt had leukocytosis on 03/21 of 12 31 likely 2/2 to steroids     3/22/2023: Repeat Brain MRI with DTI: no interval changes, stable known brain mass with high suspicion of metastatic disease (Lung)   3/21/2023: TTE EF 75%, unremarkable finding   3/22/2023: CT A/P: Besides the known lung mass seen on previous exam, no abdominal/ pelvis metastasis observed    Plan:   - continue decadron 4mg q6h, keppra 500 mg q12h, Protonix 40 mg for ulcer ppx  - neurology, pulm, hem/onc and NSGY consulted, appreciate recommendations:  - Per Oleg Cote will plan for OR " "3/23/2023 for resection of brain mass  Holding ASA  Patient was NPO at midnight   - Continue atorvastatin 40 mg daily in the evening given CVA hx    - counseled patients on risks vs benefits of holding aspirin  Pt in agreement with holding ASA for possible biopsy/resection of mass later this week  - hem/onc consulted for management of metastatic malignancy, appreciate recommendations   - Will f/u with morning CBC w/ diff  - Will monitor fingerstick glucose   - PT/OT eval when appropriate  Will likely need SNF placement on dc       Lung nodule  Assessment & Plan  Has a 20 pk-year smoking history, quit 30 years ago  Pt has no previous hx of lung cancer screening and has no pulm symptoms  CTA head and neck initially done on 3/19/2023 incidentally showed RUL mass with partially imaged right hilar adenopathy  CT chest/abd/pelvis with contrast 3/20/2023 shows \"3 8 x 3 5 cm right upper lobe lung mass with associated overlying pleural tag and adjacent to pleural thickening, this may be due to pleural reaction or mild pleural invasion  No contralateral lung nodules or mass  Right hilar lymphadenopathy  Small lower right paratracheal left paratracheal lymph node do not meet the criteria for pathologic enlargement on the bases of size or morphology  There is no CT evidence of for metastatic disease in the abdomen and pelvis  No lytic destructive lesion in the visualized bony skeleton  \" which is concerning for a primary lung tumor given recently diagnosed brain mass concerning for metastasis on MRI brain  Given findings,    -Consulted pulmonology: Agreed with proceeding with brain mass resection and biopsy, no immediate pulmonology intervention at this point, will wait for staging and pathology from biopsy    -Consulted hem/onc for management of metastatic malignancy: Recommend full body bone scan which was completed on 3/22 and showed no metastatic disease    Patient is recommended to follow-up with heme-onc 2 weeks after " discharge, possible PET-CT; they will continue following and waiting for biopsy results  Cerebral edema (HCC)  Assessment & Plan  - Per MRI 3/22; no increase of vasogenic edema   - Continue with Keppra and Steroid at current dosage     CVA (cerebral vascular accident) [facility])  Assessment & Plan  Hx CVA 11-12 years ago with mild residual L sided deficits, baseline fully functional motor  - will hold aspirin 81 mg for scheduled surgery 3/23/2023    Essential hypertension  Assessment & Plan  Systolic (64ISM), FEN:399 , Min:123 , Max:172     Initial 177/98 on transfer  Continue PTA medications amlodipine 10 mg and losartan 50 mg daily, antihypertensive med given on day of surgery           Subjective:   No acute events overnight per handoff  Patient seen and examined at bedside  Patient is nervous about surgery  Patient was NPO at midnight  No concern or reports per nursing staff  Vitals:     Vitals:    03/22/23 2326 03/23/23 0208 03/23/23 0652 03/23/23 0855   BP:  123/70 124/76 (!) 172/91   Pulse:  62 82 74   Resp:  19 18    Temp: 97 8 °F (36 6 °C) 98 1 °F (36 7 °C) 98 2 °F (36 8 °C)    TempSrc:       SpO2:  94% 97% 97%   Weight:       Height:         Temp:  [97 7 °F (36 5 °C)-98 2 °F (36 8 °C)] 98 2 °F (36 8 °C)  HR:  [59-98] 74  Resp:  [16-19] 18  BP: (123-172)/(70-91) 172/91  Weight (last 2 days)     Date/Time Weight    03/21/23 1210 82 6 (182)          Intake/Output Summary (Last 24 hours) at 3/23/2023 1119  Last data filed at 3/23/2023 0856  Gross per 24 hour   Intake --   Output 1700 ml   Net -1700 ml     Invasive Devices     Peripheral Intravenous Line  Duration           Peripheral IV 03/19/23 Proximal;Right;Ventral (anterior) Forearm 3 days                Physical Exam:   Physical Exam  Vitals and nursing note reviewed  Constitutional:       General: He is not in acute distress  Appearance: He is well-developed  HENT:      Head: Normocephalic and atraumatic     Eyes:      Conjunctiva/sclera: Conjunctivae normal    Cardiovascular:      Rate and Rhythm: Normal rate and regular rhythm  Heart sounds: No murmur heard  Pulmonary:      Effort: Pulmonary effort is normal  No respiratory distress  Breath sounds: Normal breath sounds  Abdominal:      Palpations: Abdomen is soft  Tenderness: There is no abdominal tenderness  Musculoskeletal:         General: No swelling  Cervical back: Neck supple  Comments: 5/5 strength in LE and 4/5 in UE   Skin:     General: Skin is warm and dry  Capillary Refill: Capillary refill takes less than 2 seconds  Neurological:      Mental Status: He is alert  Psychiatric:         Mood and Affect: Mood normal             Labs:     CBC:  Results from last 7 days   Lab Units 03/23/23  0656 03/22/23  0443 03/21/23  0628 03/20/23  0136 03/19/23  1148   WBC Thousand/uL 13 29* 12 13* 12 31*  --  8 32   HEMOGLOBIN g/dL 14 1 12 9 13 1  --  14 8   HEMATOCRIT % 42 7 38 8 39 7  --  44 5   PLATELETS Thousands/uL 319 310 291 287 316   NEUTROS ABS Thousands/µL  --  10 74*  --   --   --        CMP:  Results from last 7 days   Lab Units 03/23/23  0656 03/21/23  0628 03/19/23  1148   POTASSIUM mmol/L 4 0 3 8 3 8   CHLORIDE mmol/L 110* 111* 105   CO2 mmol/L 26 25 26   BUN mg/dL 18 14 15   CREATININE mg/dL 0 76 0 74 0 91   CALCIUM mg/dL 8 5 8 9 9 6   AST U/L 10  --   --    ALT U/L 15  --   --    ALK PHOS U/L 82  --   --    EGFR ml/min/1 73sq m 93 94 85       Sepsis:        Micro:       Imaging:   NM bone scan whole body    Result Date: 3/22/2023  Impression: 1  No focal tracer activity characteristic of osseous metastatic disease   Workstation performed: [id number]     CT chest abdomen pelvis w contrast    Result Date: 3/21/2023  Impression: 3 8 x 3 5 cm right upper lobe lung mass with associated overlying pleural tag and adjacent to pleural thickening, this may be due to pleural reaction or mild pleural invasion No contralateral lung nodules or mass Right hilar lymphadenopathy  Small lower right paratracheal left paratracheal lymph node do not meet the criteria for pathologic enlargement on the bases of size or morphology There is no CT evidence of for metastatic disease in the abdomen and pelvis No lytic destructive lesion in the visualized bony skeleton The study was marked in EPIC for significant notification  Workstation performed: [id number]     MRI Brain BT w wo Contrast    Result Date: 3/22/2023  Impression: Unchanged 2 5 cm right parasagittal frontal lobe vertex mass with central necrosis and marked perilesional vasogenic edema unchanged  Favor metastatic disease (given lung mass) over primary high-grade glioma  Unchanged small subacute infarct in right frontal lobe  Unchanged 0 2 cm leftward midline shift  No new acute intracranial abnormality   Workstation performed: [id number]       Medications:     Current Facility-Administered Medications   Medication Dose Route Frequency   • [MAR Hold] amLODIPine (NORVASC) tablet 10 mg  10 mg Oral Daily   • [MAR Hold] atorvastatin (LIPITOR) tablet 40 mg  40 mg Oral After Dinner   • ceFAZolin (ANCEF) IVPB (premix in dextrose) 2,000 mg 50 mL  2,000 mg Intravenous Once   • [MAR Hold] dexamethasone (DECADRON) injection 4 mg  4 mg Intravenous Q6H [address]   • [MAR Hold] levETIRAcetam (KEPPRA) 500 mg in sodium chloride 0 9 % 100 mL IVPB  500 mg Intravenous Q12H [address]   • [MAR Hold] losartan (COZAAR) tablet 50 mg  50 mg Oral Daily   • [MAR Hold] pantoprazole (PROTONIX) EC tablet 40 mg  40 mg Oral Early Morning   • sodium chloride 0 9 % infusion  125 mL/hr Intravenous Continuous     Facility-Administered Medications Ordered in Other Encounters   Medication Dose Route Frequency   • fentanyl citrate (PF) 100 MCG/2ML   Intravenous PRN   • lidocaine (PF) (XYLOCAINE-MPF) 1 % injection   Intravenous PRN   • propofol (DIPRIVAN) 1000 mg in 100 mL infusion (premix)   Intravenous Continuous PRN   • propofol (DIPRIVAN) 200 MG/20ML bolus injection Intravenous PRN   • remifentanil (ULTIVA) 25 mcg/mL in sodium chloride 0 9 % 20 mL   Intravenous Continuous PRN   • Succinylcholine Chloride 100 mg/5 mL syringe   Intravenous PRN       Patient was discussed with [facility] Attending on-call, Dr Mohsen Mckeon MD  See attestation above        Tay Caban MD  PGY-1 [facility]   03/23/23

## 2023-03-23 NOTE — CASE MANAGEMENT
Case Management Progress Note    Patient name Maylin Beverage  Location [address] [place] [address]  MRN [id number]  : 1955 Date 3/23/2023       LOS (days): 3  Geometric Mean LOS (GMLOS) (days): 3 80  Days to GMLOS:1 9        OBJECTIVE:        Current admission status: Inpatient  Preferred Pharmacy:    [address]  Phone: [phone] Fax: [phone]    Primary Care Provider: Sahrla Elias DO    Primary Insurance: BLUE CROSS  Secondary Insurance: MEDICARE    PROGRESS NOTE: Wife states that she received notification from her insurance provider that [facility] is in network with her insurance  This CM emailed Inpatient insurance verifiers to switch [employer] back to patient's primary insurance    Switch made and confirmed

## 2023-03-23 NOTE — ANESTHESIA PROCEDURE NOTES
Arterial Line Insertion  Performed by: Yaw Beckham CRNA  Authorized by: Elio Botello DO   Consent: Verbal consent obtained  Risks and benefits: risks, benefits and alternatives were discussed  Consent given by: patient  Patient understanding: patient states understanding of the procedure being performed  Patient consent: the patient's understanding of the procedure matches consent given  Procedure consent: procedure consent matches procedure scheduled  Relevant documents: relevant documents present and verified  Test results: test results available and properly labeled  Site marked: the operative site was marked  Radiology Images: Radiology Images displayed and confirmed  If images not available, report reviewed  Required items: required blood products, implants, devices, and special equipment available  Patient identity confirmed: arm band and verbally with patient  Time out: Immediately prior to procedure a "time out" was called to verify the correct patient, procedure, equipment, support staff and site/side marked as required  Preparation: Patient was prepped and draped in the usual sterile fashion    Indications: multiple ABGs and hemodynamic monitoring  Orientation:  Left  Location: radial artery  Sedation:  Patient sedated: yes  Sedation type: (geta)    Procedure Details:  Rigoberto's test normal: yes  Needle gauge: 20  Seldinger technique: Seldinger technique used  Number of attempts: 1    Post-procedure:  Post-procedure: dressing applied and chlorhexidine patch applied  Waveform: good waveform  Post-procedure CNS: normal

## 2023-03-23 NOTE — CONSULTS
"[address] [age] y o  male MRN: [id number]  Unit/Bed#: ICU 03 Encounter: [id number]    -------------------------------------------------------------------------------------------------------------  Chief Complaint: Brain Mass    History of Present Illness   HX and PE limited by: None  Costa Rollins is a 69 y o  male with PMH including HTN, history of CVA, hypercholesterolemia who presented 3/20 to [facility] with 1 week of progressive left sided weakness of the upper and lower extremities  CTA showed a mass and MRI showed \"\"Rim-enhancing 2 4 cm centrally necrotic metastatic lesion in the right frontal lobe with surrounding severe vasogenic edema  Mass effect in the right hemisphere with approximately 2 4 mm right to left midline shift  \" Patient was transferred from [place] and is now S/P right frontal craniotomy for tumor resection on 3/23  Patient was seen and examined post surgery  He feels well overall, noting left sided weakness and a headache, describes as 3-4/10  Per patient he has always had \"numbness\" and occasionally stiffness on the left side after a stroke he had many years ago  Prior to his admission he noticed worsening left sided weakness  He does not smoke cigarettes  Smokes marijuana daily  1-2 alcoholic drinks weekly  Notes history of breath cancer in his sister and prostate cancer in himself  History obtained from the patient   -------------------------------------------------------------------------------------------------------------  Assessment and Plan:    Neuro:   • Diagnosis: Brain Mass  o Plan:  - CTA 3/1: Right frontal lobe cystic lesion with surrounding vasogenic edema most consistent with metastatic disease given findings described below  Local mass effect on the right lateral ventricle without significant midline shift    Contrast-enhanced MRI of the brain recommended for further evaluation  - MRI 3/19: Rim-enhancing 2 4 cm centrally necrotic metastatic lesion " in the right frontal lobe with surrounding severe vasogenic edema  Mass effect in the right hemisphere with approximately 2 4 mm right to left midline shift  Small focus of anterior cortical frontal acute to subacute ischemia  No evidence of acute intracranial hemorrhage   - MRI 3/21: Unchanged 2 5 cm right parasagittal frontal lobe vertex mass with central necrosis and marked perilesional vasogenic edema unchanged  Favor metastatic disease (given lung mass) over primary high-grade glioma  Unchanged small subacute infarct in right frontal lobe  Unchanged 0 2 cm leftward midline shift  No new acute intracranial abnormality   - Q1 Neuro checks  - Keppra for 1 week  - SBP goal < 160, MAP > 65   - MRI ordered for tomorrow  - Pain control - Tylenol, Oxycodone  - Per neurosurgery, Keppra 500 mg Q12 for 1 week  - Decadron taper   - S/P Cefazolin for surgical PPX    - STAT CT for acute change in neuro exam      • Diagnosis: CVA  o Plan:   - History of CVA 11-12 years ago with mild residual L sided deficits, baseline fully functional motor   - On home ASA, held day of surgery  Hold until surgery okayed  - Echo 3/21: EF 75%  CV:   • Diagnosis: Hypertension  o Plan:   - On home Amlodipine 10 mg and Losartan 50 mg daily   - SBP goal < 160, MAP > 65  • Diagnosis: Hypercholesterolemia  o Plan:   - On home Statin  Pulm:  • Diagnosis: Lung Nodule   o Plan  - CT CAP: 3 8 x 3 5 cm right upper lobe lung mass with associated overlying pleural tag and adjacent to pleural thickening, this may be due to pleural reaction or mild pleural invasion  No contralateral lung nodules or mass  Right hilar lymphadenopathy  - Newly discovered this admission  - NM Bone Scan: No focal tracer activity characteristic of osseous metastatic disease   - Oncology consulted  - Pending biopsy from brain mass before lung biopsy  - Comfortable on room air  GI:   • Diagnosis: No acute issues  o Plan:   - Monitor labs    - Protonix 40 mg daily    - Laura  :   • Diagnosis: Difficult Catheter Placement Intraop  o Plan:   - Per neurosurgery: urology consulted for difficult catheter placement intraop, do not remove catheter unless otherwise stated  - Monitor I/O    F/E/N:   • F: Normal Saline 75 ml/hr  • E: Replete as needed for K> 4, Phos>3, Mg>2  • N: Regular diet  Heme/Onc:   • Diagnosis: No acute issues  o Plan  - Monitor labs  Endo:   • Diagnosis: No acute issues  o Plan:  - Monitor blood close  - Blood Glucose 120-183 Q24 hours  ID:   • Diagnosis: No acute issues  o Plan:  - Follow labs while inpatient  MSK/Skin:   • Diagnosis: No acute issues  o Plan:   - PT/OT as appropriate  Disposition: Admit to Critical Care   Code Status: Level 1 - Full Code  --------------------------------------------------------------------------------------------------------------  Review of Systems   Constitutional: Negative for chills, fatigue, fever and unexpected weight change  HENT: Negative for hearing loss and sore throat  Eyes: Negative for visual disturbance  Respiratory: Negative for cough  Cardiovascular: Negative for chest pain, palpitations and leg swelling  Gastrointestinal: Negative for abdominal distention, abdominal pain, blood in stool, constipation, diarrhea, nausea and vomiting  Endocrine: Negative for polyuria  Genitourinary: Negative for hematuria  Musculoskeletal: Negative for arthralgias and back pain  Skin: Negative for rash and wound  Neurological: Positive for weakness and headaches  Negative for dizziness, tremors, seizures and light-headedness  Psychiatric/Behavioral: Negative for dysphoric mood  The patient is not nervous/anxious  A 12-point, complete review of systems was reviewed and negative except as stated above     Physical Exam  Vitals reviewed  Constitutional:       Appearance: Normal appearance  HENT:      Head: Normocephalic and atraumatic        Right Ear: "External ear normal       Left Ear: External ear normal       Nose: Nose normal       Mouth/Throat:      Mouth: Mucous membranes are moist    Eyes:      Extraocular Movements: Extraocular movements intact  Pupils: Pupils are equal, round, and reactive to light  Cardiovascular:      Rate and Rhythm: Normal rate and regular rhythm  Pulses: Normal pulses  Heart sounds: Normal heart sounds  Pulmonary:      Effort: Pulmonary effort is normal       Breath sounds: Normal breath sounds  Abdominal:      General: Abdomen is flat  Bowel sounds are normal  There is no distension  Palpations: Abdomen is soft  Tenderness: There is no abdominal tenderness  Hernia: A hernia is present  Comments: Umbilical hernia noted  Musculoskeletal:         General: No swelling  Normal range of motion  Cervical back: Normal range of motion  Right lower leg: No edema  Left lower leg: No edema  Skin:     General: Skin is warm  Capillary Refill: Capillary refill takes less than 2 seconds  Neurological:      Mental Status: He is alert and oriented to person, place, and time  Cranial Nerves: Cranial nerve deficit present  Motor: Weakness present  Comments: Left sided weakness- 4/5 strength in LUE and LLE  5/5 in RUE and RLE  Impaired finger nose test on left  Sensation intact  Weak shoulder shrug on left  Pronator drift on left          --------------------------------------------------------------------------------------------------------------  Vitals:   Vitals:    03/23/23 1545 03/23/23 1600 03/23/23 1651 03/23/23 1700   BP: 144/79      Pulse: 66 64 60 66   Resp: 17 19 17 21   Temp: 99 1 °F (37 3 °C)  97 8 °F (36 6 °C)    TempSrc:   Oral    SpO2: 99% 99% 97% 96%   Weight: 82 2 kg (181 lb 3 5 oz)      Height:         Temp  Min: 97 5 °F (36 4 °C)  Max: 99 1 °F (37 3 °C)  IBW (Ideal Body Weight): 70 7 kg  Height: 5' 9\" (175 3 cm)  Body mass index is 26 76 kg/m²    SvO2:  " Laboratory and Diagnostics:  Results from last 7 days   Lab Units 03/23/23  0656 03/22/23  0443 03/21/23  0628 03/20/23  0136 03/19/23  1148   WBC Thousand/uL 13 29* 12 13* 12 31*  --  8 32   HEMOGLOBIN g/dL 14 1 12 9 13 1  --  14 8   HEMATOCRIT % 42 7 38 8 39 7  --  44 5   PLATELETS Thousands/uL 319 310 291 287 316   NEUTROS PCT %  --  89*  --   --   --    BANDS PCT % 1  --   --   --   --    MONOS PCT %  --  4  --   --   --    MONO PCT % 5  --   --   --   --      Results from last 7 days   Lab Units 03/23/23  0656 03/21/23  0628 03/19/23  1148   SODIUM mmol/L 139 139 140   POTASSIUM mmol/L 4 0 3 8 3 8   CHLORIDE mmol/L 110* 111* 105   CO2 mmol/L 26 25 26   ANION GAP mmol/L 3* 3* 9   BUN mg/dL 18 14 15   CREATININE mg/dL 0 76 0 74 0 91   CALCIUM mg/dL 8 5 8 9 9 6   GLUCOSE RANDOM mg/dL 139 152* 103   ALT U/L 15  --   --    AST U/L 10  --   --    ALK PHOS U/L 82  --   --    ALBUMIN g/dL 3 7  --   --    TOTAL BILIRUBIN mg/dL 0 30  --   --           Results from last 7 days   Lab Units 03/23/23  0656 03/19/23  1148   INR  1 00 0 96   PTT seconds 24 27              ABG:    VBG:          Micro:        EKG: Reviewed  Imaging: I have personally reviewed pertinent reports        Historical Information   Past Medical History:   Diagnosis Date   • Hypertension    • Prostate cancer ([address] ) 2001   • Rectal bleeding    • Stroke [facility])     2011       Past Surgical History:   Procedure Laterality Date   • COLONOSCOPY     • PROSTATECTOMY  2001   • TONSILLECTOMY       Social History   Social History     Substance and Sexual Activity   Alcohol Use Yes   • Alcohol/week: 6 0 standard drinks   • Types: 6 Cans of beer per week    Comment: does not drink every day     Social History     Substance and Sexual Activity   Drug Use Not Currently   • Types: Marijuana     Social History     Tobacco Use   Smoking Status Former   • Packs/day: 1 00   • Years: 15 00   • Pack years: 15 00   • Types: Cigarettes   • Passive exposure: Never   Smokeless Tobacco Never   Tobacco Comments    i quit when i was 28  not sure of exact dates     Exercise History: >4 mets    Family History:   Family History   Problem Relation Age of Onset   • Dementia Mother            • Breast cancer Sister            • Prostate cancer Brother         Received Radiation     I have reviewed this patient's family history and commented on sigificant items within the HPI      Medications:  Current Facility-Administered Medications   Medication Dose Route Frequency   • acetaminophen (TYLENOL) tablet 975 mg  975 mg Oral Q8H [address]   • amLODIPine (NORVASC) tablet 10 mg  10 mg Oral Daily   • atorvastatin (LIPITOR) tablet 40 mg  40 mg Oral After Dinner   • bisacodyl (DULCOLAX) rectal suppository 10 mg  10 mg Rectal Daily PRN   • ceFAZolin (ANCEF) IVPB (premix in dextrose) 2,000 mg 50 mL  2,000 mg Intravenous Q8H   • dexamethasone (DECADRON) tablet 4 mg  4 mg Oral Q6H [address]    Followed by   • [START ON 3/25/2023] dexamethasone (DECADRON) tablet 4 mg  4 mg Oral Q8H [address]    Followed by   • [START ON 3/27/2023] dexamethasone (DECADRON) tablet 2 mg  2 mg Oral Q6H [address]    Followed by   • [START ON 3/30/2023] dexamethasone (DECADRON) tablet 2 mg  2 mg Oral Q8H [address]    Followed by   • [START ON 3/31/2023] dexamethasone (DECADRON) tablet 2 mg  2 mg Oral Q12H [address]    Followed by   • [START ON 4/3/2023] dexamethasone (DECADRON) tablet 2 mg  2 mg Oral Q24H [address]   • docusate sodium (COLACE) capsule 100 mg  100 mg Oral BID   • HYDROmorphone HCl (DILAUDID) injection 0 2 mg  0 2 mg Intravenous Q4H PRN   • levETIRAcetam (KEPPRA) tablet 500 mg  500 mg Oral Q12H [address]   • losartan (COZAAR) tablet 50 mg  50 mg Oral Daily   • ondansetron (ZOFRAN) injection 4 mg  4 mg Intravenous Q6H PRN   • oxyCODONE (ROXICODONE) IR tablet 2 5 mg  2 5 mg Oral Q4H PRN   • oxyCODONE (ROXICODONE) IR tablet 5 mg  5 mg Oral Q4H PRN   • pantoprazole (PROTONIX) EC tablet 40 mg  40 mg Oral Early Morning   • [START ON 3/24/2023] senna "(SENOKOT) tablet 8 6 mg  1 tablet Oral Daily   • sodium chloride 0 9 % infusion  75 mL/hr Intravenous Continuous     Home medications:  Prior to Admission Medications   Prescriptions Last Dose Informant Patient Reported? Taking? amLODIPine (NORVASC) 10 mg tablet 3/19/2023  No Yes   Sig: Take 1 tablet (10 mg total) by mouth daily   aspirin (ECOTRIN LOW STRENGTH) 81 mg EC tablet 3/19/2023  Yes Yes   Sig: Take 81 mg by mouth daily   losartan (COZAAR) 50 mg tablet 3/19/2023  No Yes   Sig: Take 1 tablet (50 mg total) by mouth daily      Facility-Administered Medications: None     Allergies:  No Known Allergies  ------------------------------------------------------------------------------------------------------------  Advance Directive and Living Will:      Power of :    POLST:    ------------------------------------------------------------------------------------------------------------  Anticipated Length of Stay is > 2 midnights    Care Time Delivered:   No Critical Care time spent       Mita Reece MD      Portions of the record may have been created with voice recognition software  Occasional wrong word or \"sound a like\" substitutions may have occurred due to the inherent limitations of voice recognition software    Read the chart carefully and recognize, using context, where substitutions have occurred  "

## 2023-03-23 NOTE — CASE MANAGEMENT
Case Management Progress Note    Patient name Izaiah León  Location [address] [place] [address] [id number] MRN [id number]  : 1955 Date 3/23/2023       LOS (days): 3  Geometric Mean LOS (GMLOS) (days): 3 80  Days to GMLOS:2        OBJECTIVE:        Current admission status: Inpatient  Preferred Pharmacy:   [facility] [address] [place] [address]  Phone: [phone] Fax: [phone]    Primary Care Provider: Lucy Beal DO    Primary Insurance: BLUE CROSS  Secondary Insurance: MEDICARE    PROGRESS NOTE: Email sent to hospital financial counselors and IP insurance verifiers, requesting that medicare part A be switched to patient's primary insurance coverage, and blue cross now be his secondary coverage    Awaiting response

## 2023-03-23 NOTE — PLAN OF CARE
Problem: MOBILITY - ADULT  Goal: Maintain or return to baseline ADL function  Description: INTERVENTIONS:  -  Assess patient's ability to carry out ADLs; assess patient's baseline for ADL function and identify physical deficits which impact ability to perform ADLs (bathing, care of mouth/teeth, toileting, grooming, dressing, etc )  - Assess/evaluate cause of self-care deficits   - Assess range of motion  - Assess patient's mobility; develop plan if impaired  - Assess patient's need for assistive devices and provide as appropriate  - Encourage maximum independence but intervene and supervise when necessary  - Involve family in performance of ADLs  - Assess for home care needs following discharge   - Consider OT consult to assist with ADL evaluation and planning for discharge  - Provide patient education as appropriate  Outcome: Progressing  Goal: Maintains/Returns to pre admission functional level  Description: INTERVENTIONS:  - Perform BMAT or MOVE assessment daily    - Set and communicate daily mobility goal to care team and patient/family/caregiver  - Collaborate with rehabilitation services on mobility goals if consulted  - Perform Range of Motion 3 times a day  - Reposition patient every 2 hours    - Dangle patient 3 times a day  - Stand patient 3 times a day  - Ambulate patient 3 times a day  - Out of bed to chair 3 times a day   - Out of bed for meals 3 times a day  - Out of bed for toileting  - Record patient progress and toleration of activity level   Outcome: Progressing     Problem: PAIN - ADULT  Goal: Verbalizes/displays adequate comfort level or baseline comfort level  Description: Interventions:  - Encourage patient to monitor pain and request assistance  - Assess pain using appropriate pain scale  - Administer analgesics based on type and severity of pain and evaluate response  - Implement non-pharmacological measures as appropriate and evaluate response  - Consider cultural and social influences on pain and pain management  - Notify physician/advanced practitioner if interventions unsuccessful or patient reports new pain  Outcome: Progressing     Problem: INFECTION - ADULT  Goal: Absence or prevention of progression during hospitalization  Description: INTERVENTIONS:  - Assess and monitor for signs and symptoms of infection  - Monitor lab/diagnostic results  - Monitor all insertion sites, i e  indwelling lines, tubes, and drains  - Monitor endotracheal if appropriate and nasal secretions for changes in amount and color  - [place] appropriate cooling/warming therapies per order  - Administer medications as ordered  - Instruct and encourage patient and family to use good hand hygiene technique  - Identify and instruct in appropriate isolation precautions for identified infection/condition  Outcome: Progressing  Goal: Absence of fever/infection during neutropenic period  Description: INTERVENTIONS:  - Monitor WBC    Outcome: Progressing     Problem: SAFETY ADULT  Goal: Maintain or return to baseline ADL function  Description: INTERVENTIONS:  -  Assess patient's ability to carry out ADLs; assess patient's baseline for ADL function and identify physical deficits which impact ability to perform ADLs (bathing, care of mouth/teeth, toileting, grooming, dressing, etc )  - Assess/evaluate cause of self-care deficits   - Assess range of motion  - Assess patient's mobility; develop plan if impaired  - Assess patient's need for assistive devices and provide as appropriate  - Encourage maximum independence but intervene and supervise when necessary  - Involve family in performance of ADLs  - Assess for home care needs following discharge   - Consider OT consult to assist with ADL evaluation and planning for discharge  - Provide patient education as appropriate  Outcome: Progressing  Goal: Maintains/Returns to pre admission functional level  Description: INTERVENTIONS:  - Perform BMAT or MOVE assessment daily    - Set and communicate daily mobility goal to care team and patient/family/caregiver  - Collaborate with rehabilitation services on mobility goals if consulted  - Perform Range of Motion 3 times a day  - Reposition patient every 2 hours    - Dangle patient 3 times a day  - Stand patient 3 times a day  - Ambulate patient 3 times a day  - Out of bed to chair 3 times a day   - Out of bed for meals 3 times a day  - Out of bed for toileting  - Record patient progress and toleration of activity level   Outcome: Progressing  Goal: Patient will remain free of falls  Description: INTERVENTIONS:  -  Assess patient's ability to carry out ADLs; assess patient's baseline for ADL function and identify physical deficits which impact ability to perform ADLs (bathing, care of mouth/teeth, toileting, grooming, dressing, etc )  - Assess/evaluate cause of self-care deficits   - Assess range of motion  - Assess patient's mobility; develop plan if impaired  - Assess patient's need for assistive devices and provide as appropriate  - Encourage maximum independence but intervene and supervise when necessary  - Involve family in performance of ADLs  - Assess for home care needs following discharge   - Consider OT consult to assist with ADL evaluation and planning for discharge  - Provide patient education as appropriate  Outcome: Progressing

## 2023-03-23 NOTE — PLAN OF CARE
Problem: MOBILITY - ADULT  Goal: Maintain or return to baseline ADL function  Description: INTERVENTIONS:  -  Assess patient's ability to carry out ADLs; assess patient's baseline for ADL function and identify physical deficits which impact ability to perform ADLs (bathing, care of mouth/teeth, toileting, grooming, dressing, etc )  - Assess/evaluate cause of self-care deficits   - Assess range of motion  - Assess patient's mobility; develop plan if impaired  - Assess patient's need for assistive devices and provide as appropriate  - Encourage maximum independence but intervene and supervise when necessary  - Involve family in performance of ADLs  - Assess for home care needs following discharge   - Consider OT consult to assist with ADL evaluation and planning for discharge  - Provide patient education as appropriate  Outcome: Progressing  Goal: Maintains/Returns to pre admission functional level  Description: INTERVENTIONS:  - Perform BMAT or MOVE assessment daily    - Set and communicate daily mobility goal to care team and patient/family/caregiver  - Collaborate with rehabilitation services on mobility goals if consulted  - Perform Range of Motion  times a day  - Reposition patient every  hours    - Dangle patient  times a day  - Stand patient  times a day  - Ambulate patient  times a day  - Out of bed to chair  times a day   - Out of bed for meals  times a day  - Out of bed for toileting  - Record patient progress and toleration of activity level   Outcome: Progressing     Problem: PAIN - ADULT  Goal: Verbalizes/displays adequate comfort level or baseline comfort level  Description: Interventions:  - Encourage patient to monitor pain and request assistance  - Assess pain using appropriate pain scale  - Administer analgesics based on type and severity of pain and evaluate response  - Implement non-pharmacological measures as appropriate and evaluate response  - Consider cultural and social influences on pain and pain management  - Notify physician/advanced practitioner if interventions unsuccessful or patient reports new pain  Outcome: Progressing     Problem: INFECTION - ADULT  Goal: Absence or prevention of progression during hospitalization  Description: INTERVENTIONS:  - Assess and monitor for signs and symptoms of infection  - Monitor lab/diagnostic results  - Monitor all insertion sites, i e  indwelling lines, tubes, and drains  - Monitor endotracheal if appropriate and nasal secretions for changes in amount and color  - Batchelor appropriate cooling/warming therapies per order  - Administer medications as ordered  - Instruct and encourage patient and family to use good hand hygiene technique  - Identify and instruct in appropriate isolation precautions for identified infection/condition  Outcome: Progressing  Goal: Absence of fever/infection during neutropenic period  Description: INTERVENTIONS:  - Monitor WBC    Outcome: Progressing     Problem: SAFETY ADULT  Goal: Maintain or return to baseline ADL function  Description: INTERVENTIONS:  -  Assess patient's ability to carry out ADLs; assess patient's baseline for ADL function and identify physical deficits which impact ability to perform ADLs (bathing, care of mouth/teeth, toileting, grooming, dressing, etc )  - Assess/evaluate cause of self-care deficits   - Assess range of motion  - Assess patient's mobility; develop plan if impaired  - Assess patient's need for assistive devices and provide as appropriate  - Encourage maximum independence but intervene and supervise when necessary  - Involve family in performance of ADLs  - Assess for home care needs following discharge   - Consider OT consult to assist with ADL evaluation and planning for discharge  - Provide patient education as appropriate  Outcome: Progressing  Goal: Maintains/Returns to pre admission functional level  Description: INTERVENTIONS:  - Perform BMAT or MOVE assessment daily    - Set and communicate daily mobility goal to care team and patient/family/caregiver  - Collaborate with rehabilitation services on mobility goals if consulted  - Perform Range of Motion  times a day  - Reposition patient every  hours    - Dangle patient  times a day  - Stand patient  times a day  - Ambulate patient  times a day  - Out of bed to chair  times a day   - Out of bed for meals  times a day  - Out of bed for toileting  - Record patient progress and toleration of activity level   Outcome: Progressing  Goal: Patient will remain free of falls  Description: INTERVENTIONS:  - Educate patient/family on patient safety including physical limitations  - Instruct patient to call for assistance with activity   - Consult OT/PT to assist with strengthening/mobility   - Keep Call bell within reach  - Keep bed low and locked with side rails adjusted as appropriate  - Keep care items and personal belongings within reach  - Initiate and maintain comfort rounds  - Make Fall Risk Sign visible to staff  - Offer Toileting every  Hours, in advance of need  - Initiate/Maintain alarm  - Obtain necessary fall risk management equipment:   - Apply yellow socks and bracelet for high fall risk patients  - Consider moving patient to room near nurses station  Outcome: Progressing

## 2023-03-23 NOTE — ANESTHESIA POSTPROCEDURE EVALUATION
Post-Op Assessment Note    CV Status:  Stable    Pain management: adequate  Multimodal analgesia used between 6 hours prior to anesthesia start to PACU discharge    Mental Status:  Alert and awake   Hydration Status:  Euvolemic and stable   PONV Controlled:  Controlled   Airway Patency:  Patent   Two or more mitigation strategies used for obstructive sleep apnea   Post Op Vitals Reviewed: Yes      Staff: CRNA         No notable events documented      /94 (03/23/23 1500)    Temp 97 5 °F (36 4 °C) (03/23/23 1500)    Pulse  66   Resp 14 (03/23/23 1500)    SpO2   99

## 2023-03-23 NOTE — PROGRESS NOTES
Neurosurgery Post Op Note    POD#0 Right frontal CRANIOTOMY IMAGE-GUIDED FOR TUMOR  EUA, MELTON INSERTION (Dr Davide Guzman, 3/23/2023)    Subjective:    Patient has a bit of head pain  Continues with weakness and numbness on the left side, maybe slightly worse than before surgery  Objective:  General appearance: alert, appears stated age, cooperative and no distress  Head: head wrap in place  Eyes: conjugate gaze  Neck: supple, symmetrical, trachea midline   Lungs: non labored breathing  Heart: regular heart rate  Neurologic:   Mental status: Alert, oriented x3, follows commands, thought content appropriate  Cranial nerves: grossly intact (Cranial nerves II-XII)  Sensory: some altered sensation on the left more so in the arm  Motor: moving all extremities with ongoing left sided weakness   RUE / RLE:  5/5 throughout   LUE:  4/5 throughout   LLE:  4+/5 HF, limited movement at the ankle    Plan:  • Continue to monitor neurological exam  • Imaging - MRI brain w wo contrast within 24 hours  • Pain control - multimodal regimen ordered  • Bowel regimen - senna, colace and prn suppository  • Additional pertinent meds  o Dex wean as ordered  o keppra x 1 week  • Special instructions   o Urology consult for difficulty melton placement intraop, melton to remain in place unless otherwise stated by urology  o SBP < 160  o MAP > 65  • Labs / vitals - CBC, BMP, coags for the morning  • Post op abx - ancef x 24 hours  • DVT ppx - SCDs only for now, will reassess pharm dvt ppx tomorrow  • Mobilize as tolerated with assistance, PT / OT evaluation pending for tomorrow  • Dispo - PACU to ICU under [facility]     Neurosurgery will continue to follow  Please call with questions or concerns

## 2023-03-23 NOTE — PLAN OF CARE
Problem: MOBILITY - ADULT  Goal: Maintain or return to baseline ADL function  Description: INTERVENTIONS:  -  Assess patient's ability to carry out ADLs; assess patient's baseline for ADL function and identify physical deficits which impact ability to perform ADLs (bathing, care of mouth/teeth, toileting, grooming, dressing, etc )  - Assess/evaluate cause of self-care deficits   - Assess range of motion  - Assess patient's mobility; develop plan if impaired  - Assess patient's need for assistive devices and provide as appropriate  - Encourage maximum independence but intervene and supervise when necessary  - Involve family in performance of ADLs  - Assess for home care needs following discharge   - Consider OT consult to assist with ADL evaluation and planning for discharge  - Provide patient education as appropriate  Outcome: Progressing  Goal: Maintains/Returns to pre admission functional level  Description: INTERVENTIONS:  - Perform BMAT or MOVE assessment daily    - Set and communicate daily mobility goal to care team and patient/family/caregiver  - Collaborate with rehabilitation services on mobility goals if consulted  - Perform Range of Motion  times a day  - Reposition patient every  hours    - Dangle patient  times a day  - Stand patient  times a day  - Ambulate patient  times a day  - Out of bed to chair  times a day   - Out of bed for meals  times a day  - Out of bed for toileting  - Record patient progress and toleration of activity level   Outcome: Progressing     Problem: PAIN - ADULT  Goal: Verbalizes/displays adequate comfort level or baseline comfort level  Description: Interventions:  - Encourage patient to monitor pain and request assistance  - Assess pain using appropriate pain scale  - Administer analgesics based on type and severity of pain and evaluate response  - Implement non-pharmacological measures as appropriate and evaluate response  - Consider cultural and social influences on pain and pain management  - Notify physician/advanced practitioner if interventions unsuccessful or patient reports new pain  Outcome: Progressing     Problem: INFECTION - ADULT  Goal: Absence or prevention of progression during hospitalization  Description: INTERVENTIONS:  - Assess and monitor for signs and symptoms of infection  - Monitor lab/diagnostic results  - Monitor all insertion sites, i e  indwelling lines, tubes, and drains  - Monitor endotracheal if appropriate and nasal secretions for changes in amount and color  - [place] appropriate cooling/warming therapies per order  - Administer medications as ordered  - Instruct and encourage patient and family to use good hand hygiene technique  - Identify and instruct in appropriate isolation precautions for identified infection/condition  Outcome: Progressing  Goal: Absence of fever/infection during neutropenic period  Description: INTERVENTIONS:  - Monitor WBC    Outcome: Progressing     Problem: SAFETY ADULT  Goal: Maintain or return to baseline ADL function  Description: INTERVENTIONS:  -  Assess patient's ability to carry out ADLs; assess patient's baseline for ADL function and identify physical deficits which impact ability to perform ADLs (bathing, care of mouth/teeth, toileting, grooming, dressing, etc )  - Assess/evaluate cause of self-care deficits   - Assess range of motion  - Assess patient's mobility; develop plan if impaired  - Assess patient's need for assistive devices and provide as appropriate  - Encourage maximum independence but intervene and supervise when necessary  - Involve family in performance of ADLs  - Assess for home care needs following discharge   - Consider OT consult to assist with ADL evaluation and planning for discharge  - Provide patient education as appropriate  Outcome: Progressing  Goal: Maintains/Returns to pre admission functional level  Description: INTERVENTIONS:  - Perform BMAT or MOVE assessment daily    - Set and communicate daily mobility goal to care team and patient/family/caregiver  - Collaborate with rehabilitation services on mobility goals if consulted  - Perform Range of Motion  times a day  - Reposition patient every  hours    - Dangle patient  times a day  - Stand patient  times a day  - Ambulate patient  times a day  - Out of bed to chair  times a day   - Out of bed for meals  times a day  - Out of bed for toileting  - Record patient progress and toleration of activity level   Outcome: Progressing  Goal: Patient will remain free of falls  Description: INTERVENTIONS:  - Educate patient/family on patient safety including physical limitations  - Instruct patient to call for assistance with activity   - Consult OT/PT to assist with strengthening/mobility   - Keep Call bell within reach  - Keep bed low and locked with side rails adjusted as appropriate  - Keep care items and personal belongings within reach  - Initiate and maintain comfort rounds  - Make Fall Risk Sign visible to staff  - Offer Toileting every  Hours, in advance of need  - Initiate/Maintain alarm  - Obtain necessary fall risk management equipment:   - Apply yellow socks and bracelet for high fall risk patients  - Consider moving patient to room near nurses station  Outcome: Progressing     Problem: NEUROSENSORY - ADULT  Goal: Achieves stable or improved neurological status  Description: INTERVENTIONS  - Monitor and report changes in neurological status  - Monitor vital signs such as temperature, blood pressure, glucose, and any other labs ordered   - Initiate measures to prevent increased intracranial pressure  - Monitor for seizure activity and implement precautions if appropriate      Outcome: Progressing  Goal: Remains free of injury related to seizures activity  Description: INTERVENTIONS  - Maintain airway, patient safety  and administer oxygen as ordered  - Monitor patient for seizure activity, document and report duration and description of seizure to physician/advanced practitioner  - If seizure occurs,  ensure patient safety during seizure  - Reorient patient post seizure  - Seizure pads on all 4 side rails  - Instruct patient/family to notify RN of any seizure activity including if an aura is experienced  - Instruct patient/family to call for assistance with activity based on nursing assessment  - Administer anti-seizure medications if ordered    Outcome: Progressing  Goal: Achieves maximal functionality and self care  Description: INTERVENTIONS  - Monitor swallowing and airway patency with patient fatigue and changes in neurological status  - Encourage and assist patient to increase activity and self care  - Encourage visually impaired, hearing impaired and aphasic patients to use assistive/communication devices  Outcome: Progressing     Problem: MOBILITY - ADULT  Goal: Maintain or return to baseline ADL function  Description: INTERVENTIONS:  -  Assess patient's ability to carry out ADLs; assess patient's baseline for ADL function and identify physical deficits which impact ability to perform ADLs (bathing, care of mouth/teeth, toileting, grooming, dressing, etc )  - Assess/evaluate cause of self-care deficits   - Assess range of motion  - Assess patient's mobility; develop plan if impaired  - Assess patient's need for assistive devices and provide as appropriate  - Encourage maximum independence but intervene and supervise when necessary  - Involve family in performance of ADLs  - Assess for home care needs following discharge   - Consider OT consult to assist with ADL evaluation and planning for discharge  - Provide patient education as appropriate  Outcome: Progressing  Goal: Maintains/Returns to pre admission functional level  Description: INTERVENTIONS:  - Perform BMAT or MOVE assessment daily    - Set and communicate daily mobility goal to care team and patient/family/caregiver     - Collaborate with rehabilitation services on mobility goals if consulted  - Perform Range of Motion  times a day  - Reposition patient every  hours    - Dangle patient  times a day  - Stand patient  times a day  - Ambulate patient  times a day  - Out of bed to chair  times a day   - Out of bed for meals  times a day  - Out of bed for toileting  - Record patient progress and toleration of activity level   Outcome: Progressing     Problem: PAIN - ADULT  Goal: Verbalizes/displays adequate comfort level or baseline comfort level  Description: Interventions:  - Encourage patient to monitor pain and request assistance  - Assess pain using appropriate pain scale  - Administer analgesics based on type and severity of pain and evaluate response  - Implement non-pharmacological measures as appropriate and evaluate response  - Consider cultural and social influences on pain and pain management  - Notify physician/advanced practitioner if interventions unsuccessful or patient reports new pain  Outcome: Progressing     Problem: INFECTION - ADULT  Goal: Absence or prevention of progression during hospitalization  Description: INTERVENTIONS:  - Assess and monitor for signs and symptoms of infection  - Monitor lab/diagnostic results  - Monitor all insertion sites, i e  indwelling lines, tubes, and drains  - Monitor endotracheal if appropriate and nasal secretions for changes in amount and color  - [place] appropriate cooling/warming therapies per order  - Administer medications as ordered  - Instruct and encourage patient and family to use good hand hygiene technique  - Identify and instruct in appropriate isolation precautions for identified infection/condition  Outcome: Progressing  Goal: Absence of fever/infection during neutropenic period  Description: INTERVENTIONS:  - Monitor WBC    Outcome: Progressing     Problem: SAFETY ADULT  Goal: Maintain or return to baseline ADL function  Description: INTERVENTIONS:  -  Assess patient's ability to carry out ADLs; assess patient's baseline for ADL function and identify physical deficits which impact ability to perform ADLs (bathing, care of mouth/teeth, toileting, grooming, dressing, etc )  - Assess/evaluate cause of self-care deficits   - Assess range of motion  - Assess patient's mobility; develop plan if impaired  - Assess patient's need for assistive devices and provide as appropriate  - Encourage maximum independence but intervene and supervise when necessary  - Involve family in performance of ADLs  - Assess for home care needs following discharge   - Consider OT consult to assist with ADL evaluation and planning for discharge  - Provide patient education as appropriate  Outcome: Progressing  Goal: Maintains/Returns to pre admission functional level  Description: INTERVENTIONS:  - Perform BMAT or MOVE assessment daily    - Set and communicate daily mobility goal to care team and patient/family/caregiver  - Collaborate with rehabilitation services on mobility goals if consulted  - Perform Range of Motion  times a day  - Reposition patient every  hours    - Dangle patient  times a day  - Stand patient  times a day  - Ambulate patient  times a day  - Out of bed to chair  times a day   - Out of bed for meals  times a day  - Out of bed for toileting  - Record patient progress and toleration of activity level   Outcome: Progressing  Goal: Patient will remain free of falls  Description: INTERVENTIONS:  - Educate patient/family on patient safety including physical limitations  - Instruct patient to call for assistance with activity   - Consult OT/PT to assist with strengthening/mobility   - Keep Call bell within reach  - Keep bed low and locked with side rails adjusted as appropriate  - Keep care items and personal belongings within reach  - Initiate and maintain comfort rounds  - Make Fall Risk Sign visible to staff  - Offer Toileting every  Hours, in advance of need  - Initiate/Maintain alarm  - Obtain necessary fall risk management equipment:   - Apply yellow socks and bracelet for high fall risk patients  - Consider moving patient to room near nurses station  Outcome: Progressing     Problem: NEUROSENSORY - ADULT  Goal: Achieves stable or improved neurological status  Description: INTERVENTIONS  - Monitor and report changes in neurological status  - Monitor vital signs such as temperature, blood pressure, glucose, and any other labs ordered   - Initiate measures to prevent increased intracranial pressure  - Monitor for seizure activity and implement precautions if appropriate      Outcome: Progressing  Goal: Remains free of injury related to seizures activity  Description: INTERVENTIONS  - Maintain airway, patient safety  and administer oxygen as ordered  - Monitor patient for seizure activity, document and report duration and description of seizure to physician/advanced practitioner  - If seizure occurs,  ensure patient safety during seizure  - Reorient patient post seizure  - Seizure pads on all 4 side rails  - Instruct patient/family to notify RN of any seizure activity including if an aura is experienced  - Instruct patient/family to call for assistance with activity based on nursing assessment  - Administer anti-seizure medications if ordered    Outcome: Progressing  Goal: Achieves maximal functionality and self care  Description: INTERVENTIONS  - Monitor swallowing and airway patency with patient fatigue and changes in neurological status  - Encourage and assist patient to increase activity and self care     - Encourage visually impaired, hearing impaired and aphasic patients to use assistive/communication devices  Outcome: Progressing     Problem: MOBILITY - ADULT  Goal: Maintain or return to baseline ADL function  Description: INTERVENTIONS:  -  Assess patient's ability to carry out ADLs; assess patient's baseline for ADL function and identify physical deficits which impact ability to perform ADLs (bathing, care of mouth/teeth, toileting, grooming, dressing, etc )  - Assess/evaluate cause of self-care deficits   - Assess range of motion  - Assess patient's mobility; develop plan if impaired  - Assess patient's need for assistive devices and provide as appropriate  - Encourage maximum independence but intervene and supervise when necessary  - Involve family in performance of ADLs  - Assess for home care needs following discharge   - Consider OT consult to assist with ADL evaluation and planning for discharge  - Provide patient education as appropriate  Outcome: Progressing  Goal: Maintains/Returns to pre admission functional level  Description: INTERVENTIONS:  - Perform BMAT or MOVE assessment daily    - Set and communicate daily mobility goal to care team and patient/family/caregiver  - Collaborate with rehabilitation services on mobility goals if consulted  - Perform Range of Motion  times a day  - Reposition patient every  hours    - Dangle patient  times a day  - Stand patient  times a day  - Ambulate patient  times a day  - Out of bed to chair  times a day   - Out of bed for meals  times a day  - Out of bed for toileting  - Record patient progress and toleration of activity level   Outcome: Progressing     Problem: PAIN - ADULT  Goal: Verbalizes/displays adequate comfort level or baseline comfort level  Description: Interventions:  - Encourage patient to monitor pain and request assistance  - Assess pain using appropriate pain scale  - Administer analgesics based on type and severity of pain and evaluate response  - Implement non-pharmacological measures as appropriate and evaluate response  - Consider cultural and social influences on pain and pain management  - Notify physician/advanced practitioner if interventions unsuccessful or patient reports new pain  Outcome: Progressing     Problem: INFECTION - ADULT  Goal: Absence or prevention of progression during hospitalization  Description: INTERVENTIONS:  - Assess and monitor for signs and symptoms of infection  - Monitor lab/diagnostic results  - Monitor all insertion sites, i e  indwelling lines, tubes, and drains  - Monitor endotracheal if appropriate and nasal secretions for changes in amount and color  - [place] appropriate cooling/warming therapies per order  - Administer medications as ordered  - Instruct and encourage patient and family to use good hand hygiene technique  - Identify and instruct in appropriate isolation precautions for identified infection/condition  Outcome: Progressing  Goal: Absence of fever/infection during neutropenic period  Description: INTERVENTIONS:  - Monitor WBC    Outcome: Progressing     Problem: SAFETY ADULT  Goal: Maintain or return to baseline ADL function  Description: INTERVENTIONS:  -  Assess patient's ability to carry out ADLs; assess patient's baseline for ADL function and identify physical deficits which impact ability to perform ADLs (bathing, care of mouth/teeth, toileting, grooming, dressing, etc )  - Assess/evaluate cause of self-care deficits   - Assess range of motion  - Assess patient's mobility; develop plan if impaired  - Assess patient's need for assistive devices and provide as appropriate  - Encourage maximum independence but intervene and supervise when necessary  - Involve family in performance of ADLs  - Assess for home care needs following discharge   - Consider OT consult to assist with ADL evaluation and planning for discharge  - Provide patient education as appropriate  Outcome: Progressing  Goal: Maintains/Returns to pre admission functional level  Description: INTERVENTIONS:  - Perform BMAT or MOVE assessment daily    - Set and communicate daily mobility goal to care team and patient/family/caregiver  - Collaborate with rehabilitation services on mobility goals if consulted  - Perform Range of Motion  times a day  - Reposition patient every hours    - Dangle patient  times a day  - Stand patient  times a day  - Ambulate patient  times a day  - Out of bed to chair  times a day   - Out of bed for meals  times a day  - Out of bed for toileting  - Record patient progress and toleration of activity level   Outcome: Progressing  Goal: Patient will remain free of falls  Description: INTERVENTIONS:  - Educate patient/family on patient safety including physical limitations  - Instruct patient to call for assistance with activity   - Consult OT/PT to assist with strengthening/mobility   - Keep Call bell within reach  - Keep bed low and locked with side rails adjusted as appropriate  - Keep care items and personal belongings within reach  - Initiate and maintain comfort rounds  - Make Fall Risk Sign visible to staff  - Offer Toileting every  Hours, in advance of need  - Initiate/Maintain alarm  - Obtain necessary fall risk management equipment:   - Apply yellow socks and bracelet for high fall risk patients  - Consider moving patient to room near nurses station  Outcome: Progressing     Problem: NEUROSENSORY - ADULT  Goal: Achieves stable or improved neurological status  Description: INTERVENTIONS  - Monitor and report changes in neurological status  - Monitor vital signs such as temperature, blood pressure, glucose, and any other labs ordered   - Initiate measures to prevent increased intracranial pressure  - Monitor for seizure activity and implement precautions if appropriate      Outcome: Progressing  Goal: Remains free of injury related to seizures activity  Description: INTERVENTIONS  - Maintain airway, patient safety  and administer oxygen as ordered  - Monitor patient for seizure activity, document and report duration and description of seizure to physician/advanced practitioner  - If seizure occurs,  ensure patient safety during seizure  - Reorient patient post seizure  - Seizure pads on all 4 side rails  - Instruct patient/family to notify RN of any seizure activity including if an aura is experienced  - Instruct patient/family to call for assistance with activity based on nursing assessment  - Administer anti-seizure medications if ordered    Outcome: Progressing  Goal: Achieves maximal functionality and self care  Description: INTERVENTIONS  - Monitor swallowing and airway patency with patient fatigue and changes in neurological status  - Encourage and assist patient to increase activity and self care  - Encourage visually impaired, hearing impaired and aphasic patients to use assistive/communication devices  Outcome: Progressing     Problem: MOBILITY - ADULT  Goal: Maintain or return to baseline ADL function  Description: INTERVENTIONS:  -  Assess patient's ability to carry out ADLs; assess patient's baseline for ADL function and identify physical deficits which impact ability to perform ADLs (bathing, care of mouth/teeth, toileting, grooming, dressing, etc )  - Assess/evaluate cause of self-care deficits   - Assess range of motion  - Assess patient's mobility; develop plan if impaired  - Assess patient's need for assistive devices and provide as appropriate  - Encourage maximum independence but intervene and supervise when necessary  - Involve family in performance of ADLs  - Assess for home care needs following discharge   - Consider OT consult to assist with ADL evaluation and planning for discharge  - Provide patient education as appropriate  Outcome: Progressing  Goal: Maintains/Returns to pre admission functional level  Description: INTERVENTIONS:  - Perform BMAT or MOVE assessment daily    - Set and communicate daily mobility goal to care team and patient/family/caregiver  - Collaborate with rehabilitation services on mobility goals if consulted  - Perform Range of Motion  times a day  - Reposition patient every  hours    - Dangle patient  times a day  - Stand patient  times a day  - Ambulate patient  times a day  - Out of bed to chair  times a day   - Out of bed for meals  times a day  - Out of bed for toileting  - Record patient progress and toleration of activity level   Outcome: Progressing     Problem: PAIN - ADULT  Goal: Verbalizes/displays adequate comfort level or baseline comfort level  Description: Interventions:  - Encourage patient to monitor pain and request assistance  - Assess pain using appropriate pain scale  - Administer analgesics based on type and severity of pain and evaluate response  - Implement non-pharmacological measures as appropriate and evaluate response  - Consider cultural and social influences on pain and pain management  - Notify physician/advanced practitioner if interventions unsuccessful or patient reports new pain  Outcome: Progressing     Problem: INFECTION - ADULT  Goal: Absence or prevention of progression during hospitalization  Description: INTERVENTIONS:  - Assess and monitor for signs and symptoms of infection  - Monitor lab/diagnostic results  - Monitor all insertion sites, i e  indwelling lines, tubes, and drains  - Monitor endotracheal if appropriate and nasal secretions for changes in amount and color  - [place] appropriate cooling/warming therapies per order  - Administer medications as ordered  - Instruct and encourage patient and family to use good hand hygiene technique  - Identify and instruct in appropriate isolation precautions for identified infection/condition  Outcome: Progressing  Goal: Absence of fever/infection during neutropenic period  Description: INTERVENTIONS:  - Monitor WBC    Outcome: Progressing     Problem: SAFETY ADULT  Goal: Maintain or return to baseline ADL function  Description: INTERVENTIONS:  -  Assess patient's ability to carry out ADLs; assess patient's baseline for ADL function and identify physical deficits which impact ability to perform ADLs (bathing, care of mouth/teeth, toileting, grooming, dressing, etc )  - Assess/evaluate cause of self-care deficits   - Assess range of motion  - Assess patient's mobility; develop plan if impaired  - Assess patient's need for assistive devices and provide as appropriate  - Encourage maximum independence but intervene and supervise when necessary  - Involve family in performance of ADLs  - Assess for home care needs following discharge   - Consider OT consult to assist with ADL evaluation and planning for discharge  - Provide patient education as appropriate  Outcome: Progressing  Goal: Maintains/Returns to pre admission functional level  Description: INTERVENTIONS:  - Perform BMAT or MOVE assessment daily    - Set and communicate daily mobility goal to care team and patient/family/caregiver  - Collaborate with rehabilitation services on mobility goals if consulted  - Perform Range of Motion  times a day  - Reposition patient every  hours    - Dangle patient  times a day  - Stand patient  times a day  - Ambulate patient  times a day  - Out of bed to chair  times a day   - Out of bed for me times a day  - Out of bed for toileting  - Record patient progress and toleration of activity level   Outcome: Progressing  Goal: Patient will remain free of falls  Description: INTERVENTIONS:  - Educate patient/family on patient safety including physical limitations  - Instruct patient to call for assistance with activity   - Consult OT/PT to assist with strengthening/mobility   - Keep Call bell within reach  - Keep bed low and locked with side rails adjusted as appropriate  - Keep care items and personal belongings within reach  - Initiate and maintain comfort rounds  - Make Fall Risk Sign visible to staff  - Offer Toileting every  Hours, in advance of need  - Initiate/Maintain alarm  - Obtain necessary fall risk management equipment:   - Apply yellow socks and bracelet for high fall risk patients  - Consider moving patient to room near nurses station  Outcome: Progressing     Problem: NEUROSENSORY - ADULT  Goal: Achieves stable or improved neurological status  Description: INTERVENTIONS  - Monitor and report changes in neurological status  - Monitor vital signs such as temperature, blood pressure, glucose, and any other labs ordered   - Initiate measures to prevent increased intracranial pressure  - Monitor for seizure activity and implement precautions if appropriate      Outcome: Progressing  Goal: Remains free of injury related to seizures activity  Description: INTERVENTIONS  - Maintain airway, patient safety  and administer oxygen as ordered  - Monitor patient for seizure activity, document and report duration and description of seizure to physician/advanced practitioner  - If seizure occurs,  ensure patient safety during seizure  - Reorient patient post seizure  - Seizure pads on all 4 side rails  - Instruct patient/family to notify RN of any seizure activity including if an aura is experienced  - Instruct patient/family to call for assistance with activity based on nursing assessment  - Administer anti-seizure medications if ordered    Outcome: Progressing  Goal: Achieves maximal functionality and self care  Description: INTERVENTIONS  - Monitor swallowing and airway patency with patient fatigue and changes in neurological status  - Encourage and assist patient to increase activity and self care     - Encourage visually impaired, hearing impaired and aphasic patients to use assistive/communication devices  Outcome: Progressing

## 2023-03-23 NOTE — ARC ADMISSION
Referral received for consideration of patient for inpatient acute rehab  Noted that patient is for OR today with Neurosurgery for brain mass resection  Will need postop PT/OT evals as appropriate, and will review with [facility] physician as appropriate  Will continue to follow at this time

## 2023-03-23 NOTE — ANESTHESIA PREPROCEDURE EVALUATION
Procedure:  Right frontal CRANIOTOMY IMAGE-GUIDED FOR TUMOR (Right: Head)   Patient presented to [place] ED with sx of worsening RUE and RLE weakness and found to have a necrotic metastatic lesion in the right frontal lobe with the lung being likely as the primary  Rim-enhancing 2 4 cm centrally necrotic metastatic lesion in the right frontal lobe with surrounding severe vasogenic edema  Mass effect in the right hemisphere with approximately 2 4 mm right to left midline shift      Small focus of anterior cortical frontal acute to subacute ischemia      No evidence of acute intracranial hemorrhage  CHEST     LUNGS:  Right upper lobe lung mass seen measuring 3 8 x 3 5 cm  A pleural tag is seen extending from this mass with associated minimal overlying pleural thickening appendix  There is no extension of the neck mass to the chest wall    Received keppra, norvasc and cozaar today  NPO appropriate  Neuro exam: CN intact, 5/5 UE and LE strength, normal sensation RUE/RLE, LUE/LLE    Relevant Problems   CARDIO   (+) Essential hypertension   (+) Hypercholesterolemia      NEURO/PSYCH   (+) CVA (cerebral vascular accident) ([address] ) 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      Echo 3/21/2023  Left Ventricle Left ventricular cavity size is normal  Wall thickness is mildly increased  There is mild concentric hypertrophy  The left ventricular ejection fraction is 75%  Systolic function is hyperdynamic  Wall motion is normal  Diastolic function is mildly abnormal, consistent with grade I (abnormal) relaxation  Right Ventricle Right ventricular cavity size is normal  Systolic function is hyperdynamic  Left Atrium The atrium is mildly dilated  Right Atrium The atrium is normal in size  Aortic Valve The aortic valve is trileaflet  The leaflets are mildly thickened  The leaflets are not calcified  The leaflets exhibit normal mobility  There is no evidence of regurgitation   The aortic valve has no significant stenosis  Mitral Valve There is mild annular calcification  There is trace regurgitation  There is no evidence of stenosis  Tricuspid Valve Tricuspid valve structure is normal  There is mild regurgitation  There is no evidence of stenosis  The right ventricular systolic pressure is normal  The estimated right ventricular systolic pressure is 39 12 mmHg  Pulmonic Valve The pulmonic valve was not well visualized  There is trace regurgitation  There is no evidence of stenosis  Ascending Aorta The aortic root is normal in size  The ascending aorta is normal in size  IVC/SVC The right atrial pressure is estimated at 5 0 mmHg  The inferior vena cava is normal in size  Respirophasic changes were normal    Pericardium There is no pericardial effusion  The pericardium is normal in appearance  Pulmonary Veins The pulmonary veins have normal venous flow  Flow pattern in right superior pulmonary vein is normal          Physical Exam    Airway    Mallampati score: II  TM Distance: >3 FB  Neck ROM: full     Dental       Cardiovascular  Rhythm: regular, Rate: normal,     Pulmonary  Breath sounds clear to auscultation,     Other Findings        Anesthesia Plan  ASA Score- 3     Anesthesia Type- general with ASA Monitors  Additional Monitors: arterial line  Airway Plan: ETT  Comment: Risks/benefits and alternatives discussed with patient including possible PONV, sore throat, damage to teeth/lips/gums/esophagus, and possibility of rare anesthetic and surgical emergencies including but not limited to heart attack, stroke, and/or death  All questions were answered          Plan Factors-Exercise tolerance (METS): >4 METS  Chart reviewed  Existing labs reviewed  Patient summary reviewed  Patient is a current smoker  Patient did not smoke on day of surgery  Induction- intravenous  Postoperative Plan- Plan for postoperative opioid use   Planned trial extubation    Informed Consent- Anesthetic plan and risks discussed with patient  I personally reviewed this patient with the CRNA  Discussed and agreed on the Anesthesia Plan with the CRNA  Bob Rodriguez

## 2023-03-23 NOTE — OP NOTE
OPERATIVE REPORT  PATIENT NAME: Kelli Rodriguez    :  1955  MRN: [id number]  Pt Location: [facility] OR ROOM 09    SURGERY DATE: 3/23/2023    Surgeon(s) and Role:  Panel 1:     * Elida Hamlin MD - Primary     * Brandon Silveira PA-C - Assisting  Panel 2:     * Jaxon Guevara MD - Primary    Preop Diagnosis:  Brain mass [G93 89]  Cerebral edema ([address] ) [G93 6]  Brain compression ([address] ) [G93 5]    Post-Op Diagnosis Codes:     * Brain mass [G93 89]     * Cerebral edema ([address] ) [G93 6]     * Brain compression ([address] ) [G93 5]    Procedure(s):  Right - Right frontal CRANIOTOMY IMAGE-GUIDED FOR TUMOR  EUA  DEL CASTILLO INSERTION    Specimen(s):  ID Type Source Tests Collected by Time Destination   1 : Right Frontal Mass Tissue Brain TISSUE EXAM Elida Hamlin MD 3/23/2023 1313    2 : Right Frontal Mass Tissue Brain TISSUE EXAM Elida Hamlin MD 3/23/2023 1313        Estimated Blood Loss:   Minimal    Drains:  Urethral Catheter Latex; Other (Comment) 16 Fr  (Active)   Number of days: 0       [REMOVED] Urethral Catheter Non-latex 10 Fr  (Removed)   Irrigant Normal saline 23 1215   Number of days: 0       Anesthesia Type:   General    Operative Indications:  Brain mass [G93 89]  Cerebral edema ([address] ) [G93 6]  Brain compression ([address] ) [G93 5]  This is a pleasant 56-year-old gentleman who presented with left arm weakness as well as sensory changes and discoordination  He was found to have a right frontal mass with significant edema in his perirolandic region and a new lung mass  Given the concern for metastatic disease and a new diagnosis we discussed the risks and benefits associated with a right frontal craniotomy and resection of tumor  He understood that these risks included bleeding, infection, stroke, procedure, paralysis, and death      Operative Findings:  Frozen pathology consistent with metastatic adenocarcinoma    Complications:   None    Procedure and Technique:  After obtaining written informed consent the patient was brought to the operating room  General tracheal anesthesia was induced  Arterial line was placed  At this juncture I attempted to place the Mcclelland catheter however there was significant resistance that was met despite multiple catheters  After multiple attempts at troubleshooting and placement we elected to call urology and Dr Vanessa Salvador presented and was able to successfully scope and dilate the patient's urethra for Mcclelland placement  Please refer to his dictation and note for further details  Once the Mcclelland was placed the patient was placed in a Banda head clamp  He was placed in a supine position with his head slightly turned  Neuro navigation was then registered and confirmed to be accurate  Image guidance from the [employer] was used throughout the duration of the case  Images were loaded into the system and used for preoperative planning as well as intraoperative guidance  It was critically important through the duration of the case for navigation and avoidance of critical structures  Neuro monitoring leads were then placed and baseline motor evoked potentials and somatosensory evoked potentials were obtained and found to be monitorable  Using navigation I was able to map the location of the tumor and plan a craniotomy  His head was then clipped and head prepped and draped in the usual sterile fashion  A surgical timeout was performed  10 cc of 1% lidocaine with 100,000 of epinephrine was then infiltrated along the planned incision  10 blade was used to open this  Tyshawn clips were placed along the skin edges and a cerebellar self-retaining retractor was used to hold exposure  Next a Midas Benjamin drill was used to make a single bur hole anteriorly and laterally  This was then dissected and a trough was made along the sagittal sinus  These were then connected with a B1 craniotome  The bone flap was saved on the back table and a bacitracin soaked sponge  A 15 blade was then used to open the dura  The dura was then reflected anteriorly  The falx and sagittal sinus were carefully identified  After doing this using neuro navigation the anterior border of the tumor was carefully dissected  Unfortunately I easily entered the cyst and the cyst fluid was drained  Next, I then began to dissect around the cyst wall removing the cyst wall in its entirety  A portion of this was sent as frozen pathology and returned consistent with likely metastatic adenocarcinoma  Once the tumor was excised the wound cavity was lined with Surgicel and copiously irrigated  Once satisfied with hemostasis the dura was reapproximated using 4-0 Nurolon's  Dura repair and Gelfoam were placed over this and the bone flap was replaced using a titanium plating system  Once again the wound was copiously irrigated with antibiotic irrigation  The galea was then closed with inverted 2-0 Vicryl's  The skin was closed with staples  A sterile dressing and head wrap were then applied  There were 2 uninterrupted and correct surgical counts  A surgical signout was performed  The patient was then awoken from his general anesthetic and found to be in his baseline neurologic condition  He was transferred to the postanesthetic care unit  There was no qualified resident aid in this case  As such, due to its complex nature Robert Zuniga PA-C, was present and assisted for the duration of the case including exposure, craniotomy and resection of tumor, and closure        I was present for the entire procedure    Patient Disposition:  PACU         SIGNATURE: Bolivar Vallejo MD  DATE: March 23, 2023  TIME: 3:14 PM

## 2023-03-24 PROBLEM — Z97.8 FOLEY CATHETER IN PLACE: Status: ACTIVE | Noted: 2023-03-24

## 2023-03-24 LAB
ANION GAP SERPL CALCULATED.3IONS-SCNC: 4 MMOL/L (ref 4–13)
APTT PPP: 24 SECONDS (ref 23–37)
ATRIAL RATE: 67 BPM
BUN SERPL-MCNC: 19 MG/DL (ref 5–25)
CALCIUM SERPL-MCNC: 8 MG/DL (ref 8.3–10.1)
CHLORIDE SERPL-SCNC: 105 MMOL/L (ref 96–108)
CO2 SERPL-SCNC: 28 MMOL/L (ref 21–32)
CREAT SERPL-MCNC: 0.84 MG/DL (ref 0.6–1.3)
ERYTHROCYTE [DISTWIDTH] IN BLOOD BY AUTOMATED COUNT: 12.4 % (ref 11.6–15.1)
GFR SERPL CREATININE-BSD FRML MDRD: 89 ML/MIN/1.73SQ M
GLUCOSE SERPL-MCNC: 128 MG/DL (ref 65–140)
GLUCOSE SERPL-MCNC: 129 MG/DL (ref 65–140)
GLUCOSE SERPL-MCNC: 139 MG/DL (ref 65–140)
HCT VFR BLD AUTO: 39.5 % (ref 36.5–49.3)
HGB BLD-MCNC: 13.3 G/DL (ref 12–17)
INR PPP: 1.06 (ref 0.84–1.19)
MCH RBC QN AUTO: 30.1 PG (ref 26.8–34.3)
MCHC RBC AUTO-ENTMCNC: 33.7 G/DL (ref 31.4–37.4)
MCV RBC AUTO: 89 FL (ref 82–98)
P AXIS: 50 DEGREES
PLATELET # BLD AUTO: 263 THOUSANDS/UL (ref 149–390)
PMV BLD AUTO: 9.8 FL (ref 8.9–12.7)
POTASSIUM SERPL-SCNC: 3.7 MMOL/L (ref 3.5–5.3)
PR INTERVAL: 188 MS
PROTHROMBIN TIME: 14 SECONDS (ref 11.6–14.5)
QRS AXIS: 6 DEGREES
QRSD INTERVAL: 83 MS
QT INTERVAL: 400 MS
QTC INTERVAL: 423 MS
RBC # BLD AUTO: 4.42 MILLION/UL (ref 3.88–5.62)
SODIUM SERPL-SCNC: 137 MMOL/L (ref 135–147)
T WAVE AXIS: 13 DEGREES
VENTRICULAR RATE: 67 BPM
WBC # BLD AUTO: 14.43 THOUSAND/UL (ref 4.31–10.16)

## 2023-03-24 RX ORDER — HYDRALAZINE HYDROCHLORIDE 20 MG/ML
20 INJECTION INTRAMUSCULAR; INTRAVENOUS ONCE
Status: COMPLETED | OUTPATIENT
Start: 2023-03-24 | End: 2023-03-24

## 2023-03-24 RX ORDER — HYDRALAZINE HYDROCHLORIDE 20 MG/ML
10 INJECTION INTRAMUSCULAR; INTRAVENOUS EVERY 4 HOURS PRN
Status: DISCONTINUED | OUTPATIENT
Start: 2023-03-24 | End: 2023-03-30 | Stop reason: HOSPADM

## 2023-03-24 RX ORDER — POTASSIUM CHLORIDE 20 MEQ/1
40 TABLET, EXTENDED RELEASE ORAL ONCE
Status: COMPLETED | OUTPATIENT
Start: 2023-03-24 | End: 2023-03-24

## 2023-03-24 RX ORDER — HYDRALAZINE HYDROCHLORIDE 20 MG/ML
10 INJECTION INTRAMUSCULAR; INTRAVENOUS ONCE
Status: COMPLETED | OUTPATIENT
Start: 2023-03-24 | End: 2023-03-24

## 2023-03-24 RX ORDER — HEPARIN SODIUM 5000 [USP'U]/ML
5000 INJECTION, SOLUTION INTRAVENOUS; SUBCUTANEOUS EVERY 8 HOURS SCHEDULED
Status: DISCONTINUED | OUTPATIENT
Start: 2023-03-25 | End: 2023-03-24

## 2023-03-24 RX ORDER — ENOXAPARIN SODIUM 100 MG/ML
40 INJECTION SUBCUTANEOUS DAILY
Status: DISCONTINUED | OUTPATIENT
Start: 2023-03-24 | End: 2023-03-30 | Stop reason: HOSPADM

## 2023-03-24 RX ADMIN — OXYCODONE HYDROCHLORIDE 2.5 MG: 5 TABLET ORAL at 00:29

## 2023-03-24 RX ADMIN — ENOXAPARIN SODIUM 40 MG: 40 INJECTION SUBCUTANEOUS at 17:26

## 2023-03-24 RX ADMIN — CEFAZOLIN SODIUM 2000 MG: 2 SOLUTION INTRAVENOUS at 14:00

## 2023-03-24 RX ADMIN — OXYCODONE HYDROCHLORIDE 5 MG: 5 TABLET ORAL at 17:25

## 2023-03-24 RX ADMIN — LEVETIRACETAM 500 MG: 500 TABLET, FILM COATED ORAL at 20:43

## 2023-03-24 RX ADMIN — OXYCODONE HYDROCHLORIDE 2.5 MG: 5 TABLET ORAL at 04:38

## 2023-03-24 RX ADMIN — OXYCODONE HYDROCHLORIDE 2.5 MG: 5 TABLET ORAL at 11:42

## 2023-03-24 RX ADMIN — AMLODIPINE BESYLATE 10 MG: 10 TABLET ORAL at 08:50

## 2023-03-24 RX ADMIN — LEVETIRACETAM 500 MG: 500 TABLET, FILM COATED ORAL at 08:50

## 2023-03-24 RX ADMIN — DEXAMETHASONE 4 MG: 4 TABLET ORAL at 11:33

## 2023-03-24 RX ADMIN — PANTOPRAZOLE SODIUM 40 MG: 40 TABLET, DELAYED RELEASE ORAL at 05:33

## 2023-03-24 RX ADMIN — HYDRALAZINE HYDROCHLORIDE 20 MG: 20 INJECTION, SOLUTION INTRAMUSCULAR; INTRAVENOUS at 06:02

## 2023-03-24 RX ADMIN — DOCUSATE SODIUM 100 MG: 100 CAPSULE, LIQUID FILLED ORAL at 08:51

## 2023-03-24 RX ADMIN — DEXAMETHASONE 4 MG: 4 TABLET ORAL at 17:26

## 2023-03-24 RX ADMIN — ATORVASTATIN CALCIUM 40 MG: 40 TABLET, FILM COATED ORAL at 17:27

## 2023-03-24 RX ADMIN — SODIUM CHLORIDE 75 ML/HR: 0.9 INJECTION, SOLUTION INTRAVENOUS at 00:20

## 2023-03-24 RX ADMIN — LOSARTAN POTASSIUM 50 MG: 50 TABLET, FILM COATED ORAL at 08:50

## 2023-03-24 RX ADMIN — HYDROMORPHONE HYDROCHLORIDE 0.2 MG: 0.2 INJECTION, SOLUTION INTRAMUSCULAR; INTRAVENOUS; SUBCUTANEOUS at 20:43

## 2023-03-24 RX ADMIN — HYDRALAZINE HYDROCHLORIDE 10 MG: 20 INJECTION, SOLUTION INTRAMUSCULAR; INTRAVENOUS at 09:09

## 2023-03-24 RX ADMIN — SENNOSIDES 8.6 MG: 8.6 TABLET, FILM COATED ORAL at 08:51

## 2023-03-24 RX ADMIN — POTASSIUM CHLORIDE 40 MEQ: 1500 TABLET, EXTENDED RELEASE ORAL at 07:23

## 2023-03-24 RX ADMIN — CEFAZOLIN SODIUM 2000 MG: 2 SOLUTION INTRAVENOUS at 04:32

## 2023-03-24 RX ADMIN — DEXAMETHASONE 4 MG: 4 TABLET ORAL at 05:33

## 2023-03-24 RX ADMIN — HYDRALAZINE HYDROCHLORIDE 10 MG: 20 INJECTION, SOLUTION INTRAMUSCULAR; INTRAVENOUS at 04:41

## 2023-03-24 RX ADMIN — DOCUSATE SODIUM 100 MG: 100 CAPSULE, LIQUID FILLED ORAL at 17:27

## 2023-03-24 RX ADMIN — HYDROMORPHONE HYDROCHLORIDE 0.2 MG: 0.2 INJECTION, SOLUTION INTRAMUSCULAR; INTRAVENOUS; SUBCUTANEOUS at 07:23

## 2023-03-24 NOTE — PHYSICAL THERAPY NOTE
Physical Therapy Re-Evaluation     Patient's Name: Oliverio Husbands    Admitting Diagnosis  brain mass    Problem List  Patient Active Problem List   Diagnosis    Negative depression screening    Overweight (BMI 25 0-29  9)    Essential hypertension    Annual physical exam    CVA (cerebral vascular [facility]) [address] )    Hypercholesterolemia    Brain mass    Brain compression ([facility] )    Cerebral edema (HCC)    Lung nodule    Del Castillo catheter in place       Past Medical History  Past Medical History:   Diagnosis Date    Hypertension     Prostate cancer ([facility] ) 2001    Rectal bleeding     Stroke [facility])     2011         Past Surgical History  Past Surgical History:   Procedure Laterality Date    COLONOSCOPY      CRANIOTOMY Right 3/23/2023    Procedure: Right frontal CRANIOTOMY IMAGE-GUIDED FOR TUMOR;  Surgeon: Abhijeet Reilly MD;  Location: [facility];  Service: Neurosurgery    ND CYSTOURETHROSCOPY N/A 3/23/2023    Procedure: EUA, DEL CASTILLO INSERTION;  Surgeon: Александр Jama MD;  Location: [facility];  Service: Urology    PROSTATECTOMY  2001    TONSILLECTOMY            03/24/23 1051   PT Last Visit   PT Visit Date 03/24/23   Note Type   Note type Re-Evaluation   Pain Assessment   Pain Assessment Tool 0-10   Pain Score 6   Pain Location/Orientation Orientation: Right;Location: Head   Hospital Pain Intervention(s) Ambulation/increased activity;Repositioned   Restrictions/Precautions   Weight Bearing Precautions Per Order No   Other Precautions Bed Alarm; Chair Alarm;Telemetry;Multiple lines; Fall Risk;Pain;O2   Cognition   Orientation Level Oriented X4   Subjective   Subjective Pt willing and agreeable to PT session   RLE Assessment   RLE Assessment WFL   LLE Assessment   LLE Assessment   (grossly 3-/5 with movement)   Coordination   Movements are Fluid and Coordinated 0   Coordination and Movement Description decreased L sided coordination   Rapid Alternating Movements Impaired   Bed Mobility   Supine to Sit 4  Minimal assistance   Additional items Assist x 1; Increased time required   Additional Comments left resting in chair, call bell in reach, chair alarm active   Transfers   Sit to Stand 3  Moderate assistance   Additional items Assist x 2   Stand to Sit 3  Moderate assistance   Additional items Assist x 2   Stand pivot 3  Moderate assistance   Additional items Assist x 2   Ambulation/Elevation   Gait pattern Decreased foot clearance; Excessively slow; Short stride; Inconsistent andrae; Shuffling   Gait Assistance 4  Minimal assist   Additional items Assist x 1;Verbal cues   Assistive Device Other (Comment)  (HHA)   Distance 4'   Balance   Static Sitting Fair   Dynamic Sitting Fair -   Static Standing Poor +   Dynamic Standing Poor   Ambulatory Poor   Endurance Deficit   Endurance Deficit No   Activity Tolerance   Activity Tolerance Patient limited by fatigue   Medical Staff Made Aware OT and CM for D/C planning   Nurse Made Aware yes   Assessment   Prognosis Fair   Problem List Decreased strength;Decreased range of motion;Decreased endurance; Impaired balance;Decreased mobility; Decreased coordination;Decreased cognition; Impaired judgement;Decreased safety awareness;Pain   Assessment Pt is 69 y o  male seen for PT re-evaluation s/p admit to [facility] on 3/20/2023 w/ Brain mass now s/p crani for resection  PT consulted to assess pt's functional mobility and d/c needs  Order placed for PT eval and tx, w/ up w/ A order  Comorbidities affecting pt's physical performance at time of assessment include:  has a past medical history of Hypertension, Prostate cancer ([address] ), Rectal bleeding, and Stroke ([facility] )   PTA, pt was lives in multi-level home, has 4-5 ZACH and works full time as an artist  Personal factors affecting pt at time of IE include: inability to ambulate household distances, decreased cognition, limited home support, positive fall history, decreased initiation and engagement, impulsivity, unable to perform physical activity, inability to perform IADLs and inability to perform ADLs  Please find objective findings from PT assessment regarding body systems outlined above with impairments and limitations including weakness, impaired balance, decreased endurance, impaired coordination, gait deviations, pain, decreased activity tolerance, decreased functional mobility tolerance, altered sensation, decreased safety awareness, impaired judgement, fall risk and decreased cognition  Pt required LLE management during bed mobility  Required increased A during transfers with decreased L sided strength and balance  Required A to weight shift and advance LLE during gait  The following objective measures performed on IE also reveal limitations: The patient's AM-PAC Basic Mobility Inpatient Short Form Raw Score is 13, Standardized Score is 33 99  A standardized score less than 42 9 suggests the patient may benefit from discharge to post-acute rehabilitation services  Please also refer to the recommendation of the Physical Therapist for safe discharge planning  Pt's clinical presentation is currently unstable/unpredictable seen in pt's presentation of critical care monitoring  Pt to benefit from continued PT tx to address deficits as defined above and maximize level of functional independent mobility and consistency  From PT/mobility standpoint, recommendation at time of d/c would be post acute rehabilitation services pending progress in order to facilitate return to PLOF  Goals   Patient Goals To get better   [facility] Expiration Date 04/05/23   Short Term Goal #1 1  Complete bed mobility and transfers I to decrease need for caregiver in home  2  Ambulate 300' I to complete household and community mobility without A  3  Improve dynamic balance to good to decrease need for UE support during ambulation  4  Be educated & demonstate 12 steps to be able to enter home without A  PT Treatment Day 1   Plan   Treatment/Interventions OT; Spoke to case management;Spoke to nursing;Bed mobility;Gait training;Patient/family training; Endurance training;Functional transfer training;LE strengthening/ROM   PT Frequency 3-5x/wk   Recommendation   PT Discharge Recommendation Post acute rehabilitation services   Equipment Recommended [facility] Recommended Wheeled walker   Change/add to [employer]?  No   AM-PAC Basic Mobility Inpatient   Turning in Flat Bed Without Bedrails 3   Lying on Back to Sitting on Edge of Flat Bed Without Bedrails 3   Moving Bed to Chair 2   Standing Up From Chair Using Arms 2   Walk in Room 2   Climb 3-5 Stairs With Railing 1   Basic Mobility Inpatient Raw Score 13   Basic Mobility Standardized Score 33 99   Highest Level Of Mobility   JH-HLM Goal 4: Move to chair/commode   JH-HLM Achieved 4: Move to chair/commode         Shruti Ryder, PT

## 2023-03-24 NOTE — DISCHARGE SUMMARY
DISCHARGE SUMMARY - Family Medicine Residency, Jim Fill 1955, 69 y o  male  MRN: [id number]    Unit/Bed#: [facility] 729-01 Encounter: [id number]  Primary Care Provider: Dulce Dunlap DO      Admission Date: 3/20/23  Discharge Date: 03/30/23  Length of Stay: 10 days  Diagnosis:   Principal Problem:    Brain mass  Active Problems:    Essential hypertension    CVA (cerebral vascular accident) ([address] )    Brain compression ([address] )    Cerebral edema ([address] )    Lung nodule    Mcclelland catheter in place        ASSESSMENTS & PLANS:   Plans discussed with [facility] team and finalization is pending attending physician attestation  * Brain mass  Assessment & Plan  Assessment & Plan  POD#7 Right frontal CRANIOTOMY IMAGE-GUIDED FOR TUMOR (Dr Carlos Aguiar, 3/23/2023)  - R frontal brain mass w/ surrounding vasogenic edema   - presented to the [place] ED on 3/19 with progressively worsening LUE and LLE weakness  - 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, molecular study pending   - pt reports remote hx of prostate CA s/p prostatectomy several yrs ago and no further intervention or f/u since  - Neuro exam w/o focal deficit, LLE strenght 4 5/5, subjectively felt stronger than day before     Imaging:  - MRI brain 03/19: Rim-enhancing 2 4 cm centrally necrotic metastatic lesion in the right frontal lobe with surrounding severe vasogenic edema  Mass effect in the right hemisphere with approximately 2 4 mm right to left midline shift  - MRI brain 3/23: Expected postsurgical change from right parietal craniotomy and resection of cystic mass in the frontoparietal region  Stable vasogenic edema surrounding the resection cavity   Right to left midline shift of 2 mm    Plan:   Medically stable for discharge at this time to [facility]   Discharge on keppra and decadron taper  F/u OP with neuro, NSGY, heme onc     · Continue to closely monitor neuro exam at this time   · frequent neuro checks q6hr   · Repeat STAT CTH with any "acute decline in GCS > 2pts or more   · Maintain normotensive BP goals, SBP < 160   · Continue keppra 500mg q 12hrs x 1 week for seizure ppx  · Continue decadron wean as ordered q 48hours  · Hold all therapeutic doses of AC / AP therapy, continue to hold asa for at least 2 week  · DVT ppx: SCDs, on Lovenox         Melton catheter in place  Assessment & Plan  Patient with traumatic melton insert perioperatively  Urology aware   Urine clear in color   Will leave melton in place for now; plan to leave melton catheter for 7 days and follow up with urology outpatient for void trial and removal        Lung nodule  Assessment & Plan  Has a 20 pk-year smoking history, quit 30 years ago  Pt has no previous hx of lung cancer screening and has no pulm symptoms  CTA head and neck initially done on 3/19/2023 incidentally showed RUL mass with partially imaged right hilar adenopathy  CT chest/abd/pelvis with contrast 3/20/2023 shows \"3 8 x 3 5 cm right upper lobe lung mass with associated overlying pleural tag and adjacent to pleural thickening, this may be due to pleural reaction or mild pleural invasion  No contralateral lung nodules or mass  Right hilar lymphadenopathy  Small lower right paratracheal left paratracheal lymph node do not meet the criteria for pathologic enlargement on the bases of size or morphology  There is no CT evidence of for metastatic disease in the abdomen and pelvis  No lytic destructive lesion in the visualized bony skeleton  \" which is concerning for a primary lung tumor given recently diagnosed brain mass concerning for metastasis on MRI brain  Plan:   -Consulted pulmonology  -Consulted hem/onc for management of metastatic malignancy: Recommend full body bone scan which was completed on 3/22 and showed no metastatic disease  Patient is recommended to follow-up with heme-onc 2 weeks after discharge, possible PET-CT; they will continue following and waiting for biopsy results          Cerebral edema " [facility])  Assessment & Plan  - Per MRI 3/22; no increase of vasogenic edema   - Continue with Keppra and Steroid with taper per neurosurgery recommendations     CVA (cerebral vascular [address]) [address] )  Assessment & Plan  Hx CVA 11-12 years ago with mild residual L sided deficits, baseline fully functional motor  - will hold aspirin 81 mg for 2 weeks per neurosurgery recommendations     Essential hypertension  Assessment & Plan  Systolic (94ECQ), XQM:961 , Min:133 , Max:169     Initial 177/98 on transfer  Continue PTA medications amlodipine 10 mg and losartan 50 mg daily, antihypertensive med given on day of surgery     PRN Hydralazine         Patient Active Problem List   Diagnosis   • Negative depression screening   • Overweight (BMI 25 0-29  9)   • Essential hypertension   • Annual physical exam   • CVA (cerebral vascular accident) ([facility] )   • Hypercholesterolemia   • Brain mass   • Brain compression [facility])   • Cerebral edema (H[facility])   • Lung nodule   • Mcclelland catheter in place         HPI (per admission H&P note on 03/20/23)     HPI:   69 y o  male with past medical history significant for CVA with mild residual L sided deficit and HTN presenting transfer from [place] with new brain mass concerning for metastasis  Pt reports L sided weakness upper and lower progressing over the past week  He thinks it is worsening  Reports not being able to walk well/bend knees and decreased strength in LUE  Hx of stroke 11-12 years ago with mild residual L sided deficits, reports baseline is no weakness but some unusual sensation in fingers and toes  Otherwise he is normally able to function fully, ambulate without assistance and full use of L side  Pt reports no decrease in appetite, fatigue, weight loss  In fact he is gaining weight  Previously smoked 1PPD for 20 years, quit 30 years ago         HOSPITAL COURSE:     Hospital Course:   69 y o  male admitted on 3/20/2023 for L sided weakness in setting of new R frontal lobe brain mass and "RUL lung mass  CTA head and neck w/ and w/o contrast was initially done, which showed \"Right frontal lobe cystic lesion with surrounding vasogenic edema most consistent with metastatic disease given findings described below  Local mass effect on the right lateral ventricle without significant midline shift  Right upper lobe mass with partially imaged right hilar adenopathy\"  MRI brain confirmed the mass  Patient was started on decadron and keppra  CT chest/abdomen/pelvis was done to identify primary tumor, which showed \"3 8 x 3 5 cm right upper lobe lung mass with associated overlying pleural tag and adjacent to pleural thickening, this may be due to pleural reaction or mild pleural invasion  No contralateral lung nodules or mass  Right hilar lymphadenopathy\"  There was no evidence of metastasis elsewhere  A multidisciplinary team was consulted for management, including neurosurgery, neurology, pulmonology, and hematology/oncology  Neurology worked the patient up for embolic stroke given incidental small focus of anterior frontal acute to subacute ischemia on CTA but workup was negative  Neurology recommended telemetry monitoring and starting a statin given CVA history, so patient was started on atorvastatin 40 mg  Hematology/oncology recommended a whole body scan, which was negative, and follow up outpatient  Pulmonology felt that it was okay to hold off on intervention of lung mass until brain mass was biopsied and resected given patient's acute L sided weakness and necrosis and edema in the brain  Neurosurgery recommended resection of R frontal lobe mass, and patient was taken to the OR on 3/23/23 for resection of mass  Patient had no complications during surgery but urology had to be consulted for difficult Mcclelland insertion  Patient was placed in ICU for 24 hours postop for monitoring  Patient did well postop and was downgraded to stepdown sooner than 24 hours      On 03/30/23, HD# 10, pt remains stable and is " medically cleared for discharge  He will be discharged to [facility] to follow up with neuro, NSGY, oncology and needs f/u with urology outpatient for melton removal        COMPLICATIONS:     Complications: NONE       PROCEDURES:     Procedures Performed:   No orders of the defined types were placed in this encounter  SIGNIFICANT FINDINGS / ABNORMAL RESULTS:     Significant Findings/Abnormal Results with this admission:  · See above     MRI brain w wo contrast    Result Date: 3/24/2023  Impression: 1  Expected postsurgical change from right parietal craniotomy and resection of cystic mass in the frontoparietal region  2   Stable vasogenic edema surrounding the resection cavity  Right to left midline shift of 2 mm  Workstation performed: [id number]     NM bone scan whole body    Result Date: 3/22/2023  Impression: 1  No focal tracer activity characteristic of osseous metastatic disease  Workstation performed: [id number]     CT chest abdomen pelvis w contrast    Result Date: 3/21/2023  Impression: 3 8 x 3 5 cm right upper lobe lung mass with associated overlying pleural tag and adjacent to pleural thickening, this may be due to pleural reaction or mild pleural invasion No contralateral lung nodules or mass Right hilar lymphadenopathy  Small lower right paratracheal left paratracheal lymph node do not meet the criteria for pathologic enlargement on the bases of size or morphology There is no CT evidence of for metastatic disease in the abdomen and pelvis No lytic destructive lesion in the visualized bony skeleton The study was marked in EPIC for significant notification  Workstation performed: [id number]     MRI Brain BT w wo Contrast    Result Date: 3/22/2023  Impression: Unchanged 2 5 cm right parasagittal frontal lobe vertex mass with central necrosis and marked perilesional vasogenic edema unchanged  Favor metastatic disease (given lung mass) over primary high-grade glioma   Unchanged small subacute infarct in "right frontal lobe  Unchanged 0 2 cm leftward midline shift  No new acute intracranial abnormality  Workstation performed: [id number]         VITALS ON DISCHARGE DATE:     Vitals  Blood Pressure: 152/81 (03/30/23 0718)  Temperature: 98 3 °F (36 8 °C) (03/30/23 0718)  Temp Source: Oral (03/26/23 2125)  Pulse: 63 (03/30/23 0718)  Respirations: 18 (03/30/23 0718)  SpO2: 95 % (03/30/23 0718)  Height: 5' 9\" (175 3 cm) (03/21/23 1210)  Weight - Scale: 80 5 kg (177 lb 7 5 oz) (03/30/23 0547)    Temp:  [97 8 °F (36 6 °C)-98 3 °F (36 8 °C)] 98 3 °F (36 8 °C)  HR:  [56-75] 63  Resp:  [17-18] 18  BP: (126-163)/(73-90) 152/81    Weight (last 2 days) before discharge     Date/Time Weight    03/30/23 0547 80 5 (177 47)    03/29/23 0538 82 6 (182 1)    03/28/23 0543 82 6 (182 1)            Intake/Output Summary (Last 24 hours) at 3/30/2023 1231  Last data filed at 3/30/2023 1001  Gross per 24 hour   Intake 300 ml   Output 3825 ml   Net -3525 ml       Invasive Devices     Drain  Duration           Urethral Catheter Latex; Other (Comment) 16 Fr  6 days                  PHYSICAL EXAM ON DAY OF DISCHARGE:     Physical Exam:     General: Pt observed lying comfortably in bed, NAD  Not toxic/ill-appearing  No cachectic or diaphoresis  No obvious sign of trauma or bleeding  Psych: AAOx4, able to converse appropriately  Denies SH/SI  Neuro: no gross neurological deficits, CN 2-12 grossly intact  Head: horizontal scalp wound, non discharge, with dry dark blood covering staples   Eyes: open spontaneously, EOM intact, TAMEKA, conjunctiva non-injected, no scleral icterus, no discharge  Ear: normal external ear, no visible drainage at external auditory orifice  Nose: clear, no epistaxis, no rhinorrhea  Throat: clear, no hoarse voice, no cough  Neck: supple, normal ROM  Heart: RRR, no murmur/distant heart sound appreciated  Lungs: LCTABL, nml respiratory effort, no agonal/labored breathing, no accessory muscle use    Abdomen: soft, nontender, " nondistended, normal bowel sound  Extremities: LLE 4 5/5 strength     CONDITION AT DISCHARGE:   On day of discharge patient is seen and evaluated at bedside  Patient is stable and without concern  Patient denies any pain or SOB  Patient able to tolerate PO food without N/V/D and had bowel movement and baseline urine output  Patient able to ambulate independently without assistance  Patient is aware of current health status and understand plan of treatment and outpatient follow-up  The patient understood and agreed with the plan  All pertinent lab results, imaging studies, procedures, and/or any incidental findings have been disclosed to the patient  All pertinent questions are answered to patient's satisfaction  On day of discharge, the patient was hemodynamically stable and appropriate for discharge home  Condition at Discharge: good       DISCHARGE MEDICATIONS:     Discharge Medications:  See after visit summary (AVS) for detailed reconciled discharge medications, which was provided to patient and family  Summary of medication changes made with this admission:    · Continue keppra  · Continue decadron taper  · Pantoprazole for steroid ppx       FOLLOW-UP APPOINTMENTS / INSTRUCTION :     Important Physician Related Follow Up:   · Follow up with PCP  · Follow up with hematology/oncology  · Follow up with neurology  Appointment confirmed:  Future Appointments   Date Time Provider Paul Sanchez   4/5/2023 11:20 AM Deonte Rojas MD Hem Onc [facility]   4/6/2023 11:30 [facility]   4/6/2023  3:00 PM CA MRI 1 CA MRI [facility]   5/8/2023  3:00 PM Konstantin Ng MD NEURO [facility]       Discharge instructions/Information to patient and family:   See after visit summary (AVS) for information provided to patient and family        Provisions for Follow-Up Care:  See after visit summary for information related to follow-up care and any pertinent home health "orders  DISPOSITION:     Disposition: ARC    Discharge Statement   I spent 30 minutes discharging the patient  This time was spent on the day of discharge  I had direct contact with the patient on the day of discharge  Additional documentation is required if more than 30 minutes were spent on discharge  Planned Readmission: No        Daphnie Roman MD  03/30/23  12:31 PM    Dear reader, please be aware that portions of my note contain dictated text  I have done my best to proof-read this note prior to signing  However, there may be occasional unnoticed errors pertaining to \"sound-alike\" words and/or grammar during my dictation process  If there is any words or information that is unclear or appears erroneous, please kindly let me know and I will clarify and/or addend my notes accordingly  Thank you for your understanding      "

## 2023-03-24 NOTE — PLAN OF CARE
Problem: OCCUPATIONAL THERAPY ADULT  Goal: Performs self-care activities at highest level of function for planned discharge setting  See evaluation for individualized goals  Description: Treatment Interventions: ADL retraining, Functional transfer training, Endurance training, Patient/family training, Equipment evaluation/education, Compensatory technique education, Continued evaluation, Energy conservation, Activityengagement          See flowsheet documentation for full assessment, interventions and recommendations  Note: Limitation: Decreased ADL status, Decreased UE strength, Decreased Safe judgement during ADL, Decreased endurance, Decreased fine motor control, Decreased self-care trans, Decreased high-level ADLs  Prognosis: Good  Assessment: Pt is a 69 y o  male seen for OT re-evaluation s/p right frontal CRANIOTOMY IMAGE-GUIDED FOR TUMOR    Please see OTIE for comorbidity list, and PLOF  Personal factors affecting pt at time of re-eval include:steps to enter environment, difficulty performing ADLS, difficulty performing IADLS , limited insight into deficits, decreased initiation and engagement  and health management   Pt presents supine and is agreeable to participate, all vitals WNL  Pt requires overall Mod A x2, 2* the following deficits impacting occupational performance: weakness, decreased ROM, decreased strength, decreased balance, decreased tolerance, impaired GMC, impaired [address], impaired problem solving, decreased safety awareness, increased pain and decreased coping skills  Pt resting in chair at end of session with all needs in reach, alarm on, all lines in place and SCD's on  Pt to benefit from continued skilled OT tx while in the hospital to address deficits as defined above and maximize level of functional independence w ADL's and functional mobility   Occupational Performance areas to address include: eating, grooming, bathing/shower, toilet hygiene, dressing, health maintenance, functional mobility, community mobility, clothing management and social participation  The patient's raw score on the AM-PAC Daily Activity inpatient short form is 16, standardized score is 35 96, less than 39 4  Patients at this level are likely to benefit from discharge to post-acute rehabilitation services  Please refer to the recommendation of the Occupational Therapist for safe discharge planning       OT Discharge Recommendation: Post acute rehabilitation services

## 2023-03-24 NOTE — PLAN OF CARE
Problem: MOBILITY - ADULT  Goal: Maintain or return to baseline ADL function  Description: INTERVENTIONS:  -  Assess patient's ability to carry out ADLs; assess patient's baseline for ADL function and identify physical deficits which impact ability to perform ADLs (bathing, care of mouth/teeth, toileting, grooming, dressing, etc )  - Assess/evaluate cause of self-care deficits   - Assess range of motion  - Assess patient's mobility; develop plan if impaired  - Assess patient's need for assistive devices and provide as appropriate  - Encourage maximum independence but intervene and supervise when necessary  - Involve family in performance of ADLs  - Assess for home care needs following discharge   - Consider OT consult to assist with ADL evaluation and planning for discharge  - Provide patient education as appropriate  Outcome: Progressing  Goal: Maintains/Returns to pre admission functional level  Description: INTERVENTIONS:  - Perform BMAT or MOVE assessment daily    - Set and communicate daily mobility goal to care team and patient/family/caregiver  - Collaborate with rehabilitation services on mobility goals if consulted  - Perform Range of Motion  times a day  - Reposition patient every  hours    - Dangle patient  times a day  - Stand patient  times a day  - Ambulate patient  times a day  - Out of bed to chair  times a day   - Out of bed for meals  times a day  - Out of bed for toileting  - Record patient progress and toleration of activity level   Outcome: Progressing     Problem: PAIN - ADULT  Goal: Verbalizes/displays adequate comfort level or baseline comfort level  Description: Interventions:  - Encourage patient to monitor pain and request assistance  - Assess pain using appropriate pain scale  - Administer analgesics based on type and severity of pain and evaluate response  - Implement non-pharmacological measures as appropriate and evaluate response  - Consider cultural and social influences on pain and pain management  - Notify physician/advanced practitioner if interventions unsuccessful or patient reports new pain  Outcome: Progressing     Problem: INFECTION - ADULT  Goal: Absence or prevention of progression during hospitalization  Description: INTERVENTIONS:  - Assess and monitor for signs and symptoms of infection  - Monitor lab/diagnostic results  - Monitor all insertion sites, i e  indwelling lines, tubes, and drains  - Monitor endotracheal if appropriate and nasal secretions for changes in amount and color  - Perry appropriate cooling/warming therapies per order  - Administer medications as ordered  - Instruct and encourage patient and family to use good hand hygiene technique  - Identify and instruct in appropriate isolation precautions for identified infection/condition  Outcome: Progressing  Goal: Absence of fever/infection during neutropenic period  Description: INTERVENTIONS:  - Monitor WBC    Outcome: Progressing     Problem: SAFETY ADULT  Goal: Maintain or return to baseline ADL function  Description: INTERVENTIONS:  -  Assess patient's ability to carry out ADLs; assess patient's baseline for ADL function and identify physical deficits which impact ability to perform ADLs (bathing, care of mouth/teeth, toileting, grooming, dressing, etc )  - Assess/evaluate cause of self-care deficits   - Assess range of motion  - Assess patient's mobility; develop plan if impaired  - Assess patient's need for assistive devices and provide as appropriate  - Encourage maximum independence but intervene and supervise when necessary  - Involve family in performance of ADLs  - Assess for home care needs following discharge   - Consider OT consult to assist with ADL evaluation and planning for discharge  - Provide patient education as appropriate  Outcome: Progressing  Goal: Maintains/Returns to pre admission functional level  Description: INTERVENTIONS:  - Perform BMAT or MOVE assessment daily    - Set and communicate daily mobility goal to care team and patient/family/caregiver  - Collaborate with rehabilitation services on mobility goals if consulted  - Perform Range of Motion  times a day  - Reposition patient every  hours    - Dangle patient  times a day  - Stand patient  times a day  - Ambulate patient  times a day  - Out of bed to chair  times a day   - Out of bed for meals  times a day  - Out of bed for toileting  - Record patient progress and toleration of activity level   Outcome: Progressing  Goal: Patient will remain free of falls  Description: INTERVENTIONS:  - Educate patient/family on patient safety including physical limitations  - Instruct patient to call for assistance with activity   - Consult OT/PT to assist with strengthening/mobility   - Keep Call bell within reach  - Keep bed low and locked with side rails adjusted as appropriate  - Keep care items and personal belongings within reach  - Initiate and maintain comfort rounds  - Make Fall Risk Sign visible to staff  - Offer Toileting every  Hours, in advance of need  - Initiate/Maintain alarm  - Obtain necessary fall risk management equipment  - Apply yellow socks and bracelet for high fall risk patients  - Consider moving patient to room near nurses station  Outcome: Progressing     Problem: NEUROSENSORY - ADULT  Goal: Achieves stable or improved neurological status  Description: INTERVENTIONS  - Monitor and report changes in neurological status  - Monitor vital signs such as temperature, blood pressure, glucose, and any other labs ordered   - Initiate measures to prevent increased intracranial pressure  - Monitor for seizure activity and implement precautions if appropriate      Outcome: Progressing  Goal: Remains free of injury related to seizures activity  Description: INTERVENTIONS  - Maintain airway, patient safety  and administer oxygen as ordered  - Monitor patient for seizure activity, document and report duration and description of seizure to physician/advanced practitioner  - If seizure occurs,  ensure patient safety during seizure  - Reorient patient post seizure  - Seizure pads on all 4 side rails  - Instruct patient/family to notify RN of any seizure activity including if an aura is experienced  - Instruct patient/family to call for assistance with activity based on nursing assessment  - Administer anti-seizure medications if ordered    Outcome: Progressing  Goal: Achieves maximal functionality and self care  Description: INTERVENTIONS  - Monitor swallowing and airway patency with patient fatigue and changes in neurological status  - Encourage and assist patient to increase activity and self care     - Encourage visually impaired, hearing impaired and aphasic patients to use assistive/communication devices  Outcome: Progressing

## 2023-03-24 NOTE — PROGRESS NOTES
Transfer Note - ICU/Stepdown Transfer to [facility]/MS tele   Walker Sensor 69 y o  male MRN: [id number]  [facility]   [facility] Encounter: [id number]    Code Status: Level 1 - Full Code    Reason for ICU/Stepdown admission: s/p right frontal craniotomy for tumor resection     Active problems:     Mcclelland catheter in place  Assessment & Plan  Traumatic insertion during pre-op  Urology consulted and following  Leave in place per urology recommendation     Lung nodule  Assessment & Plan  Biopsy at a later date  F/u with heme/onc  CT CAP: 3 8x3 5 cm R upper lung mass, right hilar lymphadenopathy  NM bone scan: no focal tracer activity characteristics of osseous metastatic disease    HemeOnc consulted  Biopsy of brain pending    CVA (cerebral vascular accident) ([facility] [address] )  Assessment & Plan  Hx CVA 11-12 years ago   Mild L residual deficits   Home ASA held per NSx  Atorvastatin 40 mg     Essential hypertension  Assessment & Plan  Amlodipine 10 mg  Losartan 50 mg   SBP goal < 160 mmh and MAP > 65    * Brain mass  Assessment & Plan  - CTA 3/1: Right frontal lobe cystic lesion with surrounding vasogenic edema most consistent with metastatic disease given findings described below   Local mass effect on the right lateral ventricle without significant midline shift   Contrast-enhanced MRI of the brain recommended for further evaluation  - MRI 3/19: Rim-enhancing 2 4 cm centrally necrotic metastatic lesion in the right frontal lobe with surrounding severe vasogenic edema   Mass effect in the right hemisphere with approximately 2 4 mm right to left midline shift  Small focus of anterior cortical frontal acute to subacute ischemia  No evidence of acute intracranial hemorrhage   - MRI 3/21: Unchanged 2 5 cm right parasagittal frontal lobe vertex mass with central necrosis and marked perilesional vasogenic edema unchanged   Favor metastatic disease (given lung mass) over primary high-grade "glioma  Unchanged small subacute infarct in right frontal lobe  Unchanged 0 2 cm leftward midline shift  No new acute intracranial abnormality   - Q1 Neuro checks   - SBP goal < 160, MAP > 65   - 3/24 MRI w/wo - Stable vasogenic edema surrounding the resection cavity   Right to left midline shift of 2 mm  Expected post surgical changes   - Pain control prns - Tylenol, Oxycodone  - Per neurosurgery, Keppra 500 mg Q12 for 1 week  - Decadron taper   - S/P Cefazolin for surgical PPX    - STAT CT for acute change in neuro exam    - Op note - brain mass - Frozen pathology consistent with metastatic adenocarcinoma      Consultants:   · Neurosurgery   · Heme/onc    History of Present Illness/Summary of clinical course:     Elda Pitts is a 69 y  o  male with PMH including HTN, history of CVA, hypercholesterolemia who presented 3/20 to [facility] with 1 week of progressive left sided weakness of the upper and lower extremities  CTA showed a mass and MRI showed \"\"Rim-enhancing 2 4 cm centrally necrotic metastatic lesion in the right frontal lobe with surrounding severe vasogenic edema  Mass effect in the right hemisphere with approximately 2 4 mm right to left midline shift  \" Patient was transferred from [place] and is now S/P right frontal craniotomy for tumor resection on 3/23  Overnight he had no acute events  Received 30 mg hydralazine for HTN based off the arterial line  Please refer to today's progress note for further clinical details  Recent or scheduled procedures:     right frontal craniotomy for tumor resection on 3/23    Outstanding/pending diagnostics:     Brain tissue biopsy       Mobilization Plan: OOB, PT OT    Nutrition Plan: regular house             [  ] Family aware of transfer out of critical care: yes       Spoke with Dr Mynor Spear regarding transfer @ 0915  Patient accepted to their service   Service of Dr Wilber Abdullahi, Family medicine     Tatiana Page, DO    "

## 2023-03-24 NOTE — PROGRESS NOTES
[facility]  Progress Note  Name: Rodolfo Friedman  MRN: [id number]  Unit/Bed#: ICU 03 I Date of Admission: 3/20/2023   Date of Service: 3/24/2023 I Hospital Day: 4    Assessment/Plan   * Brain mass  Assessment & Plan  POD#1 ight frontal CRANIOTOMY IMAGE-GUIDED FOR TUMOR (Dr Davide Guzman, 3/23/2023)  · R frontal brain mass w/ surrounding vasogenic edema   · presented to the [place] ED on 3/19 with progressively worsening LUE and LLE weakness  · concern for possible metastatic disease given noted RUL mass on CT c/a/p  · pt reports remote hx of prostate CA s/p prostatectomy several yrs ago and no further intervention or f/u since    Imaging:   · MRI brain w wo contrast 3/23/2023: Expected postsurgical change from right parietal craniotomy and resection of cystic mass in the frontoparietal region  Stable vasogenic edema surrounding the resection cavity  Right to left midline shift of 2 mm  Plan:   · Continue to closely monitor neuro exam at this time   · frequent neuro checks per primary team   · Repeat STAT CTH with any acute decline in GCS > 2pts or more   · Maintain normotensive BP goals, SBP < 160   · Continue keppra 500mg q 12hrs x 1 week for seizure ppx  · Continue decadron wean as ordered [id number]  · Hold all therapeutic doses of AC / AP therapy, continue to hold asa for at least 2 weeks  · DVT ppx: SCDs, okay to start lovenox this afternoon  · PT/OT when able  · Continue medical management and pain control per primary team   · Consider heme/onc and rad/onc consults given concern for lung primary disease with RUL mass noted   · Okay to transfer out of the unit today  · Social work following for assistance with dispo once medically stable/cleared     Neurosurgery will continue to closely follow  Please reach out with any further questions or concerns       Cerebral edema [facility])  Assessment & Plan  - see plan as further documented above     Brain compression [facility])  Assessment & "Plan  - see plan as further documented above     Essential hypertension  Assessment & Plan  · SBP < 160  · Continue to manage per primary team         Subjective/Objective   Chief Complaint:   \"I am good  \"    Subjective:  Patient states he is doing well this morning  Has some pain behind his right eye this morning and headaches  Continues with left sided weakness, states he can lift his arm up now but unclear if this is better than prior to surgery  Still with some ankle weakness on the left  Having some ongoing numbness in left leg, not really numbness in the arm but patient is a bit of a difficult historian  Mcclelland still in place, no pain noted  No other complaints  Objective: sitting up in bed, NAD    I/O       03/22 0701  03/23 0700 03/23 0701  03/24 0700 03/24 0701 03/25 0700    P  O   450     I V  (mL/kg)  2741 7 (33 4)     IV Piggyback  50     Total Intake(mL/kg)  3241 7 (39 4)     Urine (mL/kg/hr) 1300 (0 7) 4620 (2 3) 350 (1 9)    Blood  100     Total Output 1300 4720 350    Net -1300 -1478 3 -350           Unmeasured Urine Occurrence 1 x            Invasive Devices     Peripheral Intravenous Line  Duration           Peripheral IV 03/19/23 Proximal;Right;Ventral (anterior) Forearm 4 days    Peripheral IV 03/23/23 Right Hand <1 day          Arterial Line  Duration           Arterial Line 03/23/23 Left Radial <1 day          Drain  Duration           Urethral Catheter Latex; Other (Comment) 16 Fr  <1 day                Physical Exam:  Vitals: Blood pressure (!) 176/67, pulse (!) 54, temperature 98 5 °F (36 9 °C), temperature source Oral, resp  rate (!) 11, height 5' 9\" (1 753 m), weight 82 2 kg (181 lb 3 5 oz), SpO2 95 %  ,Body mass index is 26 76 kg/m²        General appearance: alert, appears stated age, cooperative and no distress  Head: head wrap off, dressing over incision intact with dried bloody strikethrough noted  Eyes: conjugate gaze  Neck: supple, symmetrical, trachea midline   Lungs: non " labored breathing  Heart: bradycardia  Neurologic:   Mental status: Alert, oriented x 3, follows commands, speech is clear, thought content appropriate  Cranial nerves: grossly intact (Cranial nerves II-XII)  Sensory: subjective numbness in left leg however to LT reports full sensation throughout  Motor: moving all extremities as noted:   RUE / RLE:  5/5 throughout   LUE:  4/5 , 4+/5 bicep, 4/5 tricep, 4+/5 deltoid   LLE:  4-4+/5 HF, 3-4-/5 DF/PF  Coordination: finger to nose abnormal on the left due to weakness, left drift noted      Lab Results:  Results from last 7 days   Lab Units 03/24/23  0436 03/23/23  0656 03/22/23  0443   WBC Thousand/uL 14 43* 13 29* 12 13*   HEMOGLOBIN g/dL 13 3 14 1 12 9   HEMATOCRIT % 39 5 42 7 38 8   PLATELETS Thousands/uL 263 319 310   NEUTROS PCT %  --   --  89*   MONOS PCT %  --   --  4   MONO PCT %  --  5  --      Results from last 7 days   Lab Units 03/23/23  2341 03/23/23  0656 03/21/23  0628   POTASSIUM mmol/L 3 7 4 0 3 8   CHLORIDE mmol/L 105 110* 111*   CO2 mmol/L 28 26 25   BUN mg/dL 19 18 14   CREATININE mg/dL 0 84 0 76 0 74   CALCIUM mg/dL 8 0* 8 5 8 9   ALK PHOS U/L  --  82  --    ALT U/L  --  15  --    AST U/L  --  10  --              Results from last 7 days   Lab Units 03/24/23  0436 03/23/23  0656 03/19/23  1148   INR  1 06 1 00 0 96   PTT seconds 24 24 27     Imaging Studies: I have personally reviewed pertinent reports  and I have personally reviewed pertinent films in PACS    MRI brain w wo contrast    Result Date: 3/24/2023  Impression: 1  Expected postsurgical change from right parietal craniotomy and resection of cystic mass in the frontoparietal region  2   Stable vasogenic edema surrounding the resection cavity  Right to left midline shift of 2 mm  Workstation performed: [id number]     NM bone scan whole body    Result Date: 3/22/2023  Impression: 1  No focal tracer activity characteristic of osseous metastatic disease   Workstation performed: [id number] CT chest abdomen pelvis w contrast    Result Date: 3/21/2023  Impression: 3 8 x 3 5 cm right upper lobe lung mass with associated overlying pleural tag and adjacent to pleural thickening, this may be due to pleural reaction or mild pleural invasion No contralateral lung nodules or mass Right hilar lymphadenopathy  Small lower right paratracheal left paratracheal lymph node do not meet the criteria for pathologic enlargement on the bases of size or morphology There is no CT evidence of for metastatic disease in the abdomen and pelvis No lytic destructive lesion in the visualized bony skeleton The study was marked in EPIC for significant notification  Workstation performed: [id number]     MRI Brain BT w wo Contrast    Result Date: 3/22/2023  Impression: Unchanged 2 5 cm right parasagittal frontal lobe vertex mass with central necrosis and marked perilesional vasogenic edema unchanged  Favor metastatic disease (given lung mass) over primary high-grade glioma  Unchanged small subacute infarct in right frontal lobe  Unchanged 0 2 cm leftward midline shift  No new acute intracranial abnormality  Workstation performed: [id number]       EKG, Pathology, and Other Studies: I have personally reviewed pertinent reports        VTE Pharmacologic Prophylaxis: okay to start lovenox    VTE Mechanical Prophylaxis: sequential compression device

## 2023-03-24 NOTE — PROGRESS NOTES
"[facility] Acceptance Note  Daneil Severin 69 y o  male MRN: [id number]  Unit/Bed#: ICU 03 Encounter: [id number]    Date of Admission: 3/20/2023 12:13 AM  Date of Transfer: 3/24/2023    Summary:   Daneil Severin is a 69 y o  male with a past medical history of prostate cancer s/p prostatectomy, hypertension, CVA and hypercholesterolemia who presented to [facility] on 03/19 with 1 week of worsening left-sided weakness and difficulty walking  There the patient was found to have a \"rim-enhancing 2 4 cm centrally necrotic metastatic lesion in the right frontal lobe with surrounding severe vasogenic edema  Mass effect in the right hemisphere with approximately 2 4 mm right to left midline shift\"  on MRI  Patient was later transferred here for neurosurgery evaluation  During work-up patient was  found to have 3 8 x 3 5 cm right upper lobe lung mass with associated overlying pleural tag and adjacent to pleural thickening, this may be due to pleural reaction or mild pleural invasion  \"  Pulm, heme-onc, neurology and neurosurgery were consulted  Patient was taken to the OR on 03/23 for right frontal craniotomy image guided tumor EUA  Patient was then transferred to ICU for maintenance  Patient's symptoms remain stable and was transferred to the floor        Assessment and Plan:   Daneil Severin is a 69y o  year old male who is being transferred from ICU with:    * Brain mass  Assessment & Plan  Assessment & Plan  POD#1 Right frontal CRANIOTOMY IMAGE-GUIDED FOR TUMOR (Dr Damari Tellez, 3/23/2023)  - R frontal brain mass w/ surrounding vasogenic edema   - presented to the [address] [place] [address] ED on 3/19 with progressively worsening LUE and LLE weakness  - concern for possible metastatic disease given noted RUL mass on CT c/a/p  - pt reports remote hx of prostate CA s/p prostatectomy several yrs ago and no further intervention or f/u since    Imaging:  - MRI brain 03/19: Rim-enhancing 2 4 cm centrally necrotic metastatic lesion in the right frontal lobe with " "surrounding severe vasogenic edema  Mass effect in the right hemisphere with approximately 2 4 mm right to left midline shift  - MRI brain 3/23: Expected postsurgical change from right parietal craniotomy and resection of cystic mass in the frontoparietal region  Stable vasogenic edema surrounding the resection cavity  Right to left midline shift of 2 mm    Plan:   · Continue to closely monitor neuro exam at this time   · frequent neuro checks q6hr   · Repeat STAT CTH with any acute decline in GCS > 2pts or more   · Maintain normotensive BP goals, SBP < 160   · Continue keppra 500mg q 12hrs x 1 week for seizure ppx  · Continue decadron wean as ordered q 48hours  · Hold all therapeutic doses of AC / AP therapy, continue to hold asa for at least 2 week  · DVT ppx: SCDs, okay to start lovenox this afternoon  · PT/OT when able  · Continue medical management and pain control   · Heme/onc and rad/onc consults  · Social work following for assistance with dispo once medically stable/cleared       Lung nodule  Assessment & Plan  Has a 20 pk-year smoking history, quit 30 years ago  Pt has no previous hx of lung cancer screening and has no pulm symptoms  CTA head and neck initially done on 3/19/2023 incidentally showed RUL mass with partially imaged right hilar adenopathy  CT chest/abd/pelvis with contrast 3/20/2023 shows \"3 8 x 3 5 cm right upper lobe lung mass with associated overlying pleural tag and adjacent to pleural thickening, this may be due to pleural reaction or mild pleural invasion  No contralateral lung nodules or mass  Right hilar lymphadenopathy  Small lower right paratracheal left paratracheal lymph node do not meet the criteria for pathologic enlargement on the bases of size or morphology  There is no CT evidence of for metastatic disease in the abdomen and pelvis  No lytic destructive lesion in the visualized bony skeleton  \" which is concerning for a primary lung tumor given recently diagnosed brain mass " concerning for metastasis on MRI brain  Plan:  -Consulted pulmonology  -Consulted hem/onc for management of metastatic malignancy: Recommend full body bone scan which was completed on 3/22 and showed no metastatic disease  Patient is recommended to follow-up with heme-onc 2 weeks after discharge, possible PET-CT; they will continue following and waiting for biopsy results  Melton catheter in place  Assessment & Plan  Patient with traumatic melton insert perioperatively  Urology aware   Will leave melton in place for now      Cerebral edema [facility])  Assessment & Plan  - Per MRI 3/22; no increase of vasogenic edema   - Continue with Keppra and Steroid with taper per neurosurgery recommendations     CVA (cerebral vascular accident) ([address] )  Assessment & Plan  Hx CVA 11-12 years ago with mild residual L sided deficits, baseline fully functional motor  - will hold aspirin 81 mg for 2 weeks per neurosurgery recommendations     Essential hypertension  Assessment & Plan  Systolic (55PEI), YBL:045 , Min:122 , Max:176     Initial 177/98 on transfer  Continue PTA medications amlodipine 10 mg and losartan 50 mg daily, antihypertensive med given on day of surgery         Patient Active Problem List   Diagnosis   • Negative depression screening   • Overweight (BMI 25 0-29  9)   • Essential hypertension   • Annual physical exam   • CVA (cerebral vascular accident) ([address] )   • Hypercholesterolemia   • Brain mass   • Brain compression [facility])   • Cerebral edema ([address] )   • Lung nodule   • Melton catheter in place       Diet:       Diet Orders   (From admission, onward)             Start     Ordered    03/23/23 1644  Diet Regular; Regular House  Diet effective now        References:    Nutrtion Support Algorithm Enteral vs  Parenteral   Question Answer Comment   Diet Type Regular    Regular Regular House    RD to adjust diet per protocol?  Yes        03/23/23 1643               VTE PPX: SCDs, Lovenox to be started later tonight   Dispo: Patient continues to require inpatient care  Objective:   Review of Systems   Constitutional: Negative for chills and fever  Eyes: Negative for visual disturbance  Respiratory: Negative for cough and shortness of breath  Cardiovascular: Negative for chest pain and palpitations  Gastrointestinal: Negative for abdominal pain and vomiting  Genitourinary: Negative for dysuria and hematuria  Skin: Negative for color change and rash  Neurological: Positive for weakness (left leg ) and numbness (in left leg)  Negative for seizures and syncope  All other systems reviewed and are negative  Body mass index is 26 76 kg/m²  Vitals:    23 0800 23 0850 23 0900 23 0909   BP:  170/61  (!) 176/67   Pulse: 72  78    Resp: 20  (!) 26    Temp: 98 5 °F (36 9 °C)      TempSrc: Oral      SpO2: 95%  95%    Weight:       Height:         Temp:  [97 5 °F (36 4 °C)-99 1 °F (37 3 °C)] 98 5 °F (36 9 °C)  HR:  [50-78] 78  Resp:  [7-26] 26  BP: (122-176)/(61-94) 176/67  SpO2:  [92 %-99 %] 95 %  Temp (48hrs), Av 1 °F (36 7 °C), Min:97 5 °F (36 4 °C), Max:99 1 °F (37 3 °C)  Current: Temperature: 98 5 °F (36 9 °C)    Intake/Output Summary (Last 24 hours) at 3/24/2023 1049  Last data filed at 3/24/2023 0800  Gross per 24 hour   Intake 3241 67 ml   Output 4670 ml   Net -1428 33 ml     Invasive Devices     Peripheral Intravenous Line  Duration           Peripheral IV 23 Proximal;Right;Ventral (anterior) Forearm 4 days    Peripheral IV 23 Right Hand <1 day          Arterial Line  Duration           Arterial Line 23 Left Radial <1 day          Drain  Duration           Urethral Catheter Latex; Other (Comment) 16 Fr  <1 day                Physical Exam   See exam posted on ICU progress note from today     Results from last 7 days   Lab Units 23  0436 23  0656 23  0443   WBC Thousand/uL 14 43* 13 29* 12 13*   HEMOGLOBIN g/dL 13 3 14 1 12 9   HEMATOCRIT % 39 5 42 7 38 8 PLATELETS Thousands/uL 263 319 310   NEUTROS PCT %  --   --  89*   NEUTROS ABS Thousands/µL  --   --  10 74*   LYMPHS PCT %  --   --  6*   LYMPHO PCT %  --  8*  --    MONOS PCT %  --   --  4   MONO PCT %  --  5  --      Results from last 7 days   Lab Units 03/23/23  2341 03/23/23  0656 03/21/23  0628   POTASSIUM mmol/L 3 7 4 0 3 8   CHLORIDE mmol/L 105 110* 111*   CO2 mmol/L 28 26 25   BUN mg/dL 19 18 14   CREATININE mg/dL 0 84 0 76 0 74   EGFR ml/min/1 73sq m 89 93 94   CALCIUM mg/dL 8 0* 8 5 8 9   ALK PHOS U/L  --  82  --    ALT U/L  --  15  --    AST U/L  --  10  --              Results from last 7 days   Lab Units 03/24/23  0617 03/24/23  0002 03/23/23  1520 03/22/23  2351 03/22/23  1936 03/22/23  1136 03/22/23  0647 03/21/23  1704 03/21/23  1104 03/20/23  2050 03/20/23  1629 03/20/23  1212   POC GLUCOSE mg/dl 129 128 140 164* 147* 183* 120 183* 233* 167* 146* 98     Results from last 7 days   Lab Units 03/22/23  1327 03/20/23  0136   HEMOGLOBIN A1C % 5 9* 6 0*              Results from last 7 days   Lab Units 03/24/23  0436 03/23/23  0656 03/19/23  1148   INR  1 06 1 00 0 96   PTT seconds 24 24 27     Results from last 7 days   Lab Units 03/22/23  1602   MRSA CULTURE ONLY  No Methicillin Resistant Staphlyococcus aureus (MRSA) isolated     No results for input(s): COLORU, CLARITYU, SPECGRAV, PHUR, LEUKOCYTESUR, NITRITE, PROTEINUA, GLUCOSEU, KETONESU, Kinnie Tracie in the last 72 hours  Invalid input(s): Tianna Leo  ABG:No results found for: PHART, MAT8NVC, PO2ART, KXG2JJW, A2EYBARX, BEART, SOURCE    =================================================    Imagining Results:  CTA head and neck with and without contrast    Result Date: 3/19/2023  Right frontal lobe cystic lesion with surrounding vasogenic edema most consistent with metastatic disease given findings described below  Local mass effect on the right lateral ventricle without significant midline shift    Contrast-enhanced MRI of the brain "recommended for further evaluation  Right upper lobe mass with partially imaged right hilar adenopathy  Dedicated CT of the chest, abdomen and pelvis is recommended for further evaluation  No evidence for high-grade stenosis, major branch vessel occlusion or vascular aneurysm of the cervical or intracranial vessels  I personally discussed this study with ESAU FANG on 3/19/2023 at 2:08 PM  Workstation performed: [id number]     XR chest 1 view portable    Result Date: 3/20/2023  No acute cardiopulmonary disease  Right upper lung mass  Workstation performed: [id number]     MRI brain w wo contrast    Result Date: 3/24/2023  1  Expected postsurgical change from right parietal craniotomy and resection of cystic mass in the frontoparietal region  2   Stable vasogenic edema surrounding the resection cavity  Right to left midline shift of 2 mm  Workstation performed: [id number]     MRI brain w wo contrast    Result Date: 3/19/2023  Rim-enhancing 2 4 cm centrally necrotic metastatic lesion in the right frontal lobe with surrounding severe vasogenic edema  Mass effect in the right hemisphere with approximately 2 4 mm right to left midline shift  Small focus of anterior cortical frontal acute to subacute ischemia  No evidence of acute intracranial hemorrhage  Findings were marked as \"immediate\"in Epic and will now be related to the ordering physician or covering clinical team by the radiology liaison  Workstation performed: [id number]     NM bone scan whole body    Result Date: 3/22/2023  1  No focal tracer activity characteristic of osseous metastatic disease  Workstation performed: [id number]     CT chest abdomen pelvis w contrast    Result Date: 3/21/2023  3 8 x 3 5 cm right upper lobe lung mass with associated overlying pleural tag and adjacent to pleural thickening, this may be due to pleural reaction or mild pleural invasion No contralateral lung nodules or mass Right hilar lymphadenopathy    Small lower right " "paratracheal left paratracheal lymph node do not meet the criteria for pathologic enlargement on the bases of size or morphology There is no CT evidence of for metastatic disease in the abdomen and pelvis No lytic destructive lesion in the visualized bony skeleton The study was marked in EPIC for significant notification  Workstation performed: [id number]     MRI Brain BT w wo Contrast    Result Date: 3/22/2023  Unchanged 2 5 cm right parasagittal frontal lobe vertex mass with central necrosis and marked perilesional vasogenic edema unchanged  Favor metastatic disease (given lung mass) over primary high-grade glioma  Unchanged small subacute infarct in right frontal lobe  Unchanged 0 2 cm leftward midline shift  No new acute intracranial abnormality  Workstation performed: [id number]         This case was discussed with Family Medicine Attending Physician Dr Herb Izquierdo and [facility] team           ** Please Note: Portions of this note have been constructed using voice recognition software  Occasional wrong word or \"sound a like\" substitutions may have occurred due to the inherent limitations of voice recognition software  Please read the chart carefully and recognize, using context, where substitutions have occurred  **    Paul Moss   PGY 1 FM  "

## 2023-03-24 NOTE — ARC ADMISSION
Spoke with patient's spouse regarding ARC program and discharge support after discharge from acute rehab  Spouse requested ARC to check with insurance regarding network status  Spoke with Óscar Keenan,  at [facility], with call ref #: [id number]  Was notified that [facility] is NOT in network with patient's insurance coverage, and he does NOT have out of network benefits  Information was relayed to patient's spouse, who is going to call insurance herself to confirm  CM has been updated  Will continue to follow at this time

## 2023-03-24 NOTE — ASSESSMENT & PLAN NOTE
Biopsy at a later date  F/u with heme/onc  CT CAP: 3 8x3 5 cm R upper lung mass, right hilar lymphadenopathy  NM bone scan: no focal tracer activity characteristics of osseous metastatic disease    HemeOnc consulted  Biopsy of brain pending

## 2023-03-24 NOTE — PLAN OF CARE
Problem: PHYSICAL THERAPY ADULT  Goal: Performs mobility at highest level of function for planned discharge setting  See evaluation for individualized goals  Description: Treatment/Interventions: Functional transfer training, LE strengthening/ROM, Elevations, Therapeutic exercise, Endurance training, Patient/family training, Equipment eval/education, Bed mobility, Gait training, Spoke to nursing, OT  Equipment Recommended: Hannah Valdez       See flowsheet documentation for full assessment, interventions and recommendations  Note: Prognosis: Fair  Problem List: Decreased strength, Decreased range of motion, Decreased endurance, Impaired balance, Decreased mobility, Decreased coordination, Decreased cognition, Impaired judgement, Decreased safety awareness, Pain  Assessment: Pt is 69 y o  male seen for PT re-evaluation s/p admit to [facility] on 3/20/2023 w/ Brain mass now s/p crani for resection  PT consulted to assess pt's functional mobility and d/c needs  Order placed for PT eval and tx, w/ up w/ A order  Comorbidities affecting pt's physical performance at time of assessment include:  has a past medical history of Hypertension, Prostate cancer ([facility] ), Rectal bleeding, and St[facility] ([facility] )  PTA, pt was lives in multi-level home, has 4-5 ZACH and works full time as an artist  Personal factors affecting pt at time of IE include: inability to ambulate household distances, decreased cognition, limited home support, positive fall history, decreased initiation and engagement, impulsivity, unable to perform physical activity, inability to perform IADLs and inability to perform ADLs   Please find objective findings from PT assessment regarding body systems outlined above with impairments and limitations including weakness, impaired balance, decreased endurance, impaired coordination, gait deviations, pain, decreased activity tolerance, decreased functional mobility tolerance, altered sensation, decreased safety awareness, impaired judgement, fall risk and decreased cognition  Pt required LLE management during bed mobility  Required increased A during transfers with decreased L sided strength and balance  Required A to weight shift and advance LLE during gait  The following objective measures performed on IE also reveal limitations: The patient's AM-PAC Basic Mobility Inpatient Short Form Raw Score is 13, Standardized Score is 33 99  A standardized score less than 42 9 suggests the patient may benefit from discharge to post-acute rehabilitation services  Please also refer to the recommendation of the Physical Therapist for safe discharge planning  Pt's clinical presentation is currently unstable/unpredictable seen in pt's presentation of critical care monitoring  Pt to benefit from continued PT tx to address deficits as defined above and maximize level of functional independent mobility and consistency  From PT/mobility standpoint, recommendation at time of d/c would be post acute rehabilitation services pending progress in order to facilitate return to PLOF  Barriers to Discharge: Inaccessible home environment, Decreased caregiver support     PT Discharge Recommendation: Post acute rehabilitation services    See flowsheet documentation for full assessment

## 2023-03-24 NOTE — ARC ADMISSION
Reviewed patient's case with [facility] physician - will continue to follow at this time, monitoring for medical stability and functional progress  Currently awaiting postop PT/OT evals for further review  Will update as able

## 2023-03-24 NOTE — ASSESSMENT & PLAN NOTE
Traumatic insertion during pre-op  Urology consulted and following  Leave in place per urology recommendation

## 2023-03-24 NOTE — CASE MANAGEMENT
Case Management Discharge Planning Note    Patient name Flory Prince  Location ICU 03/ICU 39 MRN [id number]  : 1955 Date 3/24/2023       Current Admission Date: 3/20/2023  Current Admission Diagnosis:Brain mass   Patient Active Problem List    Diagnosis Date Noted   • Lung nodule 2023   • Brain mass 2023   • Brain compression ([facility] ) 2023   • Cerebral edema ([facility] ) 2023   • Hypercholesterolemia 2021   • CVA (cerebral vascular accident) ([facility] ) 2021   • Essential hypertension 2020   • Annual physical exam 2020   • Negative depression screening 2020   • Overweight (BMI 25 0-29 9) 2020      LOS (days): 4  Geometric Mean LOS (GMLOS) (days): 3 80  Days to GMLOS:0 9     OBJECTIVE:  Risk of Unplanned Readmission Score: 11 29         Current admission status: Inpatient   Preferred Pharmacy:    [address]  Phone: [phone] Fax: [phone]    Primary Care Provider: Dulce Dunlap DO    Primary Insurance: BLUE CROSS  Secondary Insurance: MEDICARE    DISCHARGE DETAILS:    Other Referral/Resources/Interventions Provided:  Referral Comments: Per communication with  ARC admissions, [facility] continues to follow patients case for potential admission to [address] monitoring for medical stability and functional progress  Post op PT/OT evaluations pending  CM team will continue to follow

## 2023-03-24 NOTE — CONSULTS
CONSULT    Patient Name: Tessie Galvan  Patient MRN: [id number]  Date of Service: 3/24/2023   Date / Time Note Created: 3/24/2023 3:33 PM   Referring Provider: Stephany Briceño MD  Provider Creating Note: GRACE Lazar    PCP: Susan Flores  Attending Provider:  Stephany Briceño MD    Reason for Consult: Difficult Mcclelland Catheterization/Mcclelland Placement Assistance    HPI:  Tessie Galvan is a 49-year-old male without prior  history presenting [facility] with progressive left-sided upper and lower extremity weakness  CT head demonstrated right frontal lobe cystic lesion  Patient was transferred to [facility] for neuro critical care as well as neurosurgery for biopsy  Patient is postoperative day 1 right frontal craniotomy  Mcclelland catheter was unable to be inserted intraoperatively  Our service was consulted for emergent Mcclelland catheter insertion  He is now postoperative day 1 cystoscopy with complex Mcclelland catheter insertion by Dr Doris Muñiz for the neurosurgical team   Patient is recovering  He is afebrile, hemodynamically stable and without  complaints  Renal function within normal limits  He has mild leukocytosis-- likely corticosteroid induced  Staging CT demonstrated well decompressed bilateral upper tracts with small right renal cyst-- noncomplex, unremarkable lower  tract without evidence of obstructing nephroureterolithiasis, perinephric bleeding, perinephric stranding, phlegmon, abscess or discrete fluid collection, bladder calculi, or bladder hematoma  Active Problems:    Patient Active Problem List   Diagnosis   • Negative depression screening   • Overweight (BMI 25 0-29  9)   • Essential hypertension   • Annual physical exam   • CVA (cerebral vascular accident) ([facility] [address] )   • Hypercholesterolemia   • Brain mass   • Brain compression [facility])   • Cerebral edema ([facility] [address] )   • Lung nodule   • Mcclelland catheter in place           Impressions  • Bladder Neck Contracture/Urethral Stricture  • BPH  • Urinary Retention      Recommendations    1  Catheter inserted intraoperatively by  attending Dr Alia Hendricks  2  Continue treatment for primary neurologic concern  3  Maintain Del Castillo catheter to straight drainage  4  Remove Del Castillo catheter at rehab   5  Follow-up with our service nonurgently  6  Our office will contact patient/caregiver with hospital follow-up appointment date and time  Past Medical History:   Diagnosis Date   • Hypertension    • Prostate cancer ([facility] [address] )    • Rectal bleeding    • Stroke [facility])              Past Surgical History:   Procedure Laterality Date   • COLONOSCOPY     • CRANIOTOMY Right 3/23/2023    Procedure: Right frontal CRANIOTOMY IMAGE-GUIDED FOR TUMOR;  Surgeon: Bolivar Vallejo MD;  [facility];  Service: Neurosurgery   • NJ CYSTOURETHROSCOPY N/A 3/23/2023    Procedure: EUA, DEL CASTILLO INSERTION;  Surgeon: Alia Hendricks MD;  Location[facility];  Service: Urology   • PROSTATECTOMY     • TONSILLECTOMY         Family History   Problem Relation Age of Onset   • Dementia Mother            • Breast cancer Sister            • Prostate cancer Brother         Received Radiation       Social History     Socioeconomic History   • Marital status: /Civil Union     Spouse name: Not on file   • Number of children: Not on file   • Years of education: Not on file   • Highest education level: Not on file   Occupational History   • Not on file   Tobacco Use   • Smoking status: Former     Packs/day: 1 00     Years: 15 00     Pack years: 15 00     Types: Cigarettes     Passive exposure: Never   • Smokeless tobacco: Never   • Tobacco comments:     i quit when i was 28  not sure of exact dates   Vaping Use   • Vaping Use: Never used   Substance and Sexual Activity   • Alcohol use:  Yes     Alcohol/week: 6 0 standard drinks     Types: 6 Cans of beer per week     Comment: does not drink every day   • Drug use: Not Currently     Types: "Marijuana   • Sexual activity: Yes     Partners: Female     Birth control/protection: None   Other Topics Concern   • Not on file   Social History Narrative   • Not on file     Social Determinants of Health     Financial Resource Strain: Not on file   Food Insecurity: No Food Insecurity   • Worried About Running Out of Food in the Last Year: Never true   • Ran Out of Food in the Last Year: Never true   Transportation Needs: No Transportation Needs   • Lack of Transportation (Medical): No   • Lack of Transportation (Non-Medical):  No   Physical Activity: Not on file   Stress: Not on file   Social Connections: Not on file   Intimate Partner Violence: Not on file   Housing Stability: Low Risk    • Unable to Pay for Housing in the Last Year: No   • Number of Places Lived in the Last Year: 1   • Unstable Housing in the Last Year: No       No Known Allergies    Review of Systems  Review of Systems - History obtained from unobtainable from patient due to mental status       Chart Review   Allergies, current medications, history, problem list    Vital Signs  /68   Pulse 90   Temp 98 5 °F (36 9 °C) (Oral)   Resp 21   Ht 5' 9\" (1 753 m)   Wt 82 2 kg (181 lb 3 5 oz)   SpO2 91%   BMI 26 76 kg/m²     Physical Exam  General appearance: alert, appears stated age and cooperative  Head: incision  Neck: no adenopathy, no carotid bruit, no JVD, supple, symmetrical, trachea midline and thyroid not enlarged, symmetric, no tenderness/mass/nodules  Lungs: diminished breath sounds  Heart: regular rate and rhythm, S1, S2 normal, no murmur, click, rub or gallop  Abdomen: soft, non-tender; bowel sounds normal; no masses,  no organomegaly  Extremities: Left hemiparesis  Pulses: 2+ and symmetric  Neurologic: Mental status: alertness: lethargic  Mcclelland--yellow with pink-tinged     Laboratory Studies  Lab Results   Component Value Date    HGBA1C 5 9 (H) 03/22/2023     05/10/2018    K 3 7 03/23/2023    K 3 6 05/10/2018     " 03/23/2023     05/10/2018    CO2 28 03/23/2023    CO2 26 05/10/2018    CREATININE 0 84 03/23/2023    CREATININE 0 80 05/10/2018    BUN 19 03/23/2023    BUN 17 05/10/2018     Lab Results   Component Value Date    WBC 14 43 (H) 03/24/2023    WBC 7 1 05/10/2018    RBC 4 42 03/24/2023    RBC 4 82 05/10/2018    HGB 13 3 03/24/2023    HGB 14 6 05/10/2018    HCT 39 5 03/24/2023    HCT 43 6 05/10/2018    MCV 89 03/24/2023    MCV 90 4 05/10/2018    MCH 30 1 03/24/2023    MCH 30 2 05/10/2018    RDW 12 4 03/24/2023    RDW 12 9 05/10/2018     03/24/2023     05/10/2018       Imaging and Other Studies        Medications   Current Facility-Administered Medications   Medication Dose Route Frequency Provider Last Rate   • acetaminophen  975 mg Oral Q8H PRN Pearl Pugh DO     • amLODIPine  10 mg Oral Daily Kalie Butler DO     • atorvastatin  40 mg Oral After Dinner Pearl Pugh DO     • bisacodyl  10 mg Rectal Daily PRN Pearl Pugh DO     • cefazolin  2,000 mg Intravenous Q8H Kalie Butler DO Stopped (03/24/23 0501)   • dexamethasone  4 mg Oral Q6H DO Amairani      Followed by   • [START ON 3/25/2023] dexamethasone  4 mg Oral Q8H DO Amairani      Followed by   • [START ON 3/27/2023] dexamethasone  2 mg Oral Q6H DO Amairani      Followed by   • [START ON 3/30/2023] dexamethasone  2 mg Oral Q8H DO Amairani      Followed by   • [START ON 3/31/2023] dexamethasone  2 mg Oral Q12H DO Amairani      Followed by   • [START ON 4/3/2023] dexamethasone  2 mg Oral Q24H DO Amairani     • docusate sodium  100 mg Oral BID Pearl Pugh DO     • enoxaparin  40 mg Subcutaneous Daily [address] Carlota Small MD     • hydrALAZINE  10 mg Intravenous Q4H PRN Pearl Pugh DO     • HYDROmorphone  0 2 mg Intravenous Q4H PRN Pearl Pugh DO     • levETIRAcetam  500 mg Oral Q12H [facility] Kalie Butlre DO     • losartan  50 mg Oral Daily Kalie Butler DO     • ondansetron  4 mg Intravenous Q6H PRN Kalie Rey Rdz, DO     • oxyCODONE  2 5 mg Oral Q4H PRN Umberto Solano DO     • oxyCODONE  5 mg Oral Q4H PRN Umberto Solano DO     • pantoprazole  40 mg Oral Early Morning Umberto Solano DO     • senna  1 tablet Oral Daily Kalie DO Anaid Lawson CRNP

## 2023-03-24 NOTE — ASSESSMENT & PLAN NOTE
- CTA 3/1: Right frontal lobe cystic lesion with surrounding vasogenic edema most consistent with metastatic disease given findings described below   Local mass effect on the right lateral ventricle without significant midline shift   Contrast-enhanced MRI of the brain recommended for further evaluation  - MRI 3/19: Rim-enhancing 2 4 cm centrally necrotic metastatic lesion in the right frontal lobe with surrounding severe vasogenic edema  Mass effect in the right hemisphere with approximately 2 4 mm right to left midline shift  Small focus of anterior cortical frontal acute to subacute ischemia  No evidence of acute intracranial hemorrhage   - MRI 3/21: Unchanged 2 5 cm right parasagittal frontal lobe vertex mass with central necrosis and marked perilesional vasogenic edema unchanged   Favor metastatic disease (given lung mass) over primary high-grade glioma  Unchanged small subacute infarct in right frontal lobe  Unchanged 0 2 cm leftward midline shift  No new acute intracranial abnormality   - Q1 Neuro checks   - SBP goal < 160, MAP > 65   - 3/24 MRI w/wo - Stable vasogenic edema surrounding the resection cavity   Right to left midline shift of 2 mm  Expected post surgical changes   - Pain control prns - Tylenol, Oxycodone  - Per neurosurgery, Keppra 500 mg Q12 for 1 week     - Decadron taper   - S/P Cefazolin for surgical PPX    - STAT CT for acute change in neuro exam    - Op note - brain mass - Frozen pathology consistent with metastatic adenocarcinoma

## 2023-03-24 NOTE — OCCUPATIONAL THERAPY NOTE
"    Occupational Therapy Re-Evaluation     Patient Name: Daneil Severin  HHZUN'P Date: 3/24/2023  Problem List  Principal Problem:    Brain mass  Active Problems:    Essential hypertension    CVA (cerebral vascular accident) ([facility] [address] )    Brain compression ([facility] [address] )    Cerebral edema ([facility] [address] )    Lung nodule    Mcclelland catheter in place    Past Medical History  Past Medical History:   Diagnosis Date    Hypertension     Prostate cancer ([facility] [address] ) 2001    Rectal bleeding     Stroke [facility])     2011       Past Surgical History  Past Surgical History:   Procedure Laterality Date    COLONOSCOPY      PROSTATECTOMY  2001    TONSILLECTOMY             03/24/23 1050   OT Last Visit   OT Visit Date 03/24/23   Note Type   Note type Re-Evaluation   Pain Assessment   Pain Score 6   Pain Location/Orientation Orientation: Right;Location: Head   Restrictions/Precautions   Weight Bearing Precautions Per Order No   Other Precautions Chair Alarm;Multiple lines;Telemetry; Fall Risk;Pain   Home Living   Additional Comments Please see OTIE for home set up   Prior Function   Comments Please see OTIE for PLOF   Lifestyle   Autonomy I with ADLS, IADLs +    Reciprocal Relationships supportive wife   Service to Others 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)   Intrinsic Gratification Lithography, art, bike riding   Subjective   Subjective \"Yeah, that will help, that always helps\"   ADL   Where Assessed Chair   Eating Assistance 5  Supervision/Setup   Grooming Assistance 4  Minimal Assistance   UB Bathing Assistance 4  Minimal Assistance   LB [address] 3  Moderate Assistance   [address] 4  [address] 3  Moderate [address]  3  Moderate Assistance   Bed Mobility   Supine to Sit 4  Minimal assistance   Additional items Assist x 1; Increased time required   Additional Comments OOB at end of session   Transfers   Sit to Stand 3  Moderate assistance   Additional items " Assist x 2   Stand to Sit 3  Moderate assistance   Additional items Assist x 2   Stand pivot 3  Moderate assistance   Additional items Assist x 2   Additional Comments BL HHA   Functional Mobility   Functional Mobility 3  Moderate assistance   Additional Comments Ax2 few steps to chair require assist to advance LLE as well as to weight shift   Additional items Rolling walker   Balance   Static Sitting Fair   Dynamic Sitting Fair -   Static Standing Poor +   Dynamic Standing Poor   Ambulatory Poor   Activity Tolerance   Activity Tolerance Patient limited by fatigue;Patient limited by pain   Medical Staff Made Aware DPT, NSG Aware   Nurse Made Aware yes   RUE Assessment   RUE Assessment WFL   LUE Assessment   LUE Assessment X   Hand Function   Gross Motor Coordination Impaired   Fine Motor Coordination Impaired   Hand Function Comments Decreased overall coordination LUE   Vision-Basic Assessment   Current Vision Wears glasses all the time   Vision - Complex Assessment   Additional Comments Denies visual changes   Psychosocial   Psychosocial (WDL) X   Patient Behaviors/Mood Tearful   Cognition   Overall Cognitive Status WFL   Arousal/Participation Alert; Responsive; Cooperative   Attention Attends with cues to redirect   Orientation Level Oriented X4   Memory Within functional limits   Following Commands Follows one step commands without difficulty   Comments Overall pleasant and cooperative, decreased insight into deficits noted  Be becomes tearful and requires emotional support  Wife provides humor in this situation which pt is responsive too as well   Assessment   Limitation Decreased ADL status; Decreased UE strength;Decreased Safe judgement during ADL;Decreased endurance;Decreased fine motor control;Decreased self-care trans;Decreased high-level ADLs   Prognosis Good   Assessment Pt is a 69 y o  male seen for OT re-evaluation s/p right frontal CRANIOTOMY IMAGE-GUIDED FOR TUMOR      Please see OTIE for comorbidity list, and PLOF  Personal factors affecting pt at time of re-eval include:steps to enter environment, difficulty performing ADLS, difficulty performing IADLS , limited insight into deficits, decreased initiation and engagement  and health management   Pt presents supine and is agreeable to participate, all vitals WNL  Pt requires overall Mod A x2, 2* the following deficits impacting occupational performance: weakness, decreased ROM, decreased strength, decreased balance, decreased tolerance, impaired GMC, impaired [address], impaired problem solving, decreased safety awareness, increased pain and decreased coping skills  Pt resting in chair at end of session with all needs in reach, alarm on, all lines in place and SCD's on  Pt to benefit from continued skilled OT tx while in the hospital to address deficits as defined above and maximize level of functional independence w ADL's and functional mobility  Occupational Performance areas to address include: eating, grooming, bathing/shower, toilet hygiene, dressing, health maintenance, functional mobility, community mobility, clothing management and social participation  The patient's raw score on the AM-PAC Daily Activity inpatient short form is 16, standardized score is 35 96, less than 39 4  Patients at this level are likely to benefit from discharge to post-acute rehabilitation services  Please refer to the recommendation of the Occupational Therapist for safe discharge planning  Goals   Patient Goals To get better   Plan   Treatment Interventions ADL retraining;Functional transfer training;UE strengthening/ROM; Endurance training;Patient/family training;Neuromuscular reeducation; Fine motor coordination activities; Compensatory technique education;Continued evaluation; Energy conservation; Activityengagement   Goal Expiration Date 04/07/23   OT Frequency 3-5x/wk   Recommendation   OT Discharge Recommendation Post acute rehabilitation services   [facility] Daily Activity Inpatient   Lower Body Dressing 2   Bathing 2   Toileting 2   Upper Body Dressing 3   Grooming 3   Eating 4   Daily Activity Raw Score 16   Daily Activity Standardized Score (Calc for Raw Score >=11) 35 96   AM-PAC Applied Cognition Inpatient   Following a Speech/Presentation 4   Understanding Ordinary Conversation 4   Taking Medications 4   Remembering Where Things Are Placed or Put Away 4   Remembering List of 4-5 Errands 4   Taking Care of Complicated Tasks 3   Applied Cognition Raw Score 23   Applied Cognition Standardized Score 53 08     OT goals to be addressed in the next 14 days:    1) Pt will increase activity tolerance to G for 30 min txment sessions  2) Pt will complete UB/LB dressing/self care w/ mod I using adaptive device and DME as needed  3) Pt will complete bathing w/ Mod I w/ use of AE and DME as needed  4) Pt will complete toileting w/ mod I w/ G hygiene/thoroughness using DME as needed  5) Pt will improve functional transfers to Mod I on/off all surfaces using DME as needed w/ G balance/safety   6) Pt will improve functional mobility during ADL/IADL/leisure tasks to Mod I using DME as needed w/ G balance/safety   7) Pt will participate in simulated IADL management task with DME as needed to increase independence to  w/ G safety and endurance  8) Pt will demonstrate G carryover of pt/caregiver education and training as appropriate  9) Pt will demonstrate 100% carryover of energy conservation techniques t/o functional I/ADL/leisure tasks w/o cues s/p skilled education  10) Pt will independently identify and utilize 2-3 coping strategies to increase positive affect and promote overall well-being    11) Pt will engage in ongoing cognitive assessment w/ G participation to assist w/ safe d/c planning/recommendations

## 2023-03-24 NOTE — PROGRESS NOTES
"Daily Progress Note - Critical Care   Kalyan Scott 69 y o  male MRN: [id number]  Unit/Bed#: ICU 03 Encounter: [id number]        ----------------------------------------------------------------------------------------  HPI/24hr events:     Kalyan Scott is a 69 y o  male with PMH including HTN, history of CVA, hypercholesterolemia who presented 3/20 to [facility] with 1 week of progressive left sided weakness of the upper and lower extremities  CTA showed a mass and MRI showed \"\"Rim-enhancing 2 4 cm centrally necrotic metastatic lesion in the right frontal lobe with surrounding severe vasogenic edema  Mass effect in the right hemisphere with approximately 2 4 mm right to left midline shift  \" Patient was transferred from [place] and is now S/P right frontal craniotomy for tumor resection on 3/23  Overnight:    Hypertensive requiring two doses of hydralazine 10 mg and 20 mg  Measurements between a-line and BP cuff are discordant     ---------------------------------------------------------------------------------------  SUBJECTIVE  \"my head hurts\"    Review of Systems   Neurological: Positive for weakness and headaches  Review of systems was reviewed and negative unless stated above in HPI/24-hour events   ---------------------------------------------------------------------------------------  Assessment and Plan:    Neuro:   • Diagnosis: Brain Mass  ? Plan:  - CTA 3/1: Right frontal lobe cystic lesion with surrounding vasogenic edema most consistent with metastatic disease given findings described below   Local mass effect on the right lateral ventricle without significant midline shift   Contrast-enhanced MRI of the brain recommended for further evaluation  - MRI 3/19: Rim-enhancing 2 4 cm centrally necrotic metastatic lesion in the right frontal lobe with surrounding severe vasogenic edema  Mass effect in the right hemisphere with approximately 2 4 mm right to left midline shift   Small focus of anterior cortical " frontal acute to subacute ischemia  No evidence of acute intracranial hemorrhage   - MRI 3/21: Unchanged 2 5 cm right parasagittal frontal lobe vertex mass with central necrosis and marked perilesional vasogenic edema unchanged   Favor metastatic disease (given lung mass) over primary high-grade glioma  Unchanged small subacute infarct in right frontal lobe  Unchanged 0 2 cm leftward midline shift  No new acute intracranial abnormality   - Q1 Neuro checks   - SBP goal < 160, MAP > 65   - 3/24 MRI w/wo - Stable vasogenic edema surrounding the resection cavity  Right to left midline shift of 2 mm  Expected post surgical changes   - Pain control prns - Tylenol, Oxycodone  - Per neurosurgery, Keppra 500 mg Q12 for 1 week  - Decadron taper   - S/P Cefazolin for surgical PPX    - STAT CT for acute change in neuro exam       • Diagnosis: CVA  ? Plan:   - History of CVA 11-12 years ago with mild residual L sided deficits, baseline fully functional motor   - On home ASA, held day of surgery  Hold until surgery okayed  - Echo 3/21: EF 75%      CV:   • Diagnosis: Hypertension  ? Plan:   - On home Amlodipine 10 mg and Losartan 50 mg daily   - SBP goal < 160, MAP > 65  - A-line /81 and BP cuff is 124/79  - Received total of 30mg hydralazine after midnight   • Diagnosis: Hypercholesterolemia  ? Plan:   - On home Statin       Pulm:  • Diagnosis: Lung Nodule   ? Plan  - CT CAP: 3 8 x 3 5 cm right upper lobe lung mass with associated overlying pleural tag and adjacent to pleural thickening, this may be due to pleural reaction or mild pleural invasion  No contralateral lung nodules or mass  Right hilar lymphadenopathy     - Newly discovered this admission  - NM Bone Scan: No focal tracer activity characteristic of osseous metastatic disease   - Oncology consulted  - Pending biopsy from brain mass before lung biopsy  - Comfortable on room air       GI:   • Diagnosis: No acute issues    ? Plan:   - Monitor labs   - Protonix 40 mg daily    - Laura      :   • Diagnosis: Difficult Catheter Placement Intraop  ? Plan:   - Per neurosurgery: urology consulted for difficult catheter placement intraop, do not remove catheter unless otherwise stated  - Monitor I/O     F/E/N:   • F: n/a  • E: Replete as needed for K> 4, Phos>3, Mg>2  • N: Regular diet         Heme/Onc:   • Diagnosis: No acute issues  ? Plan  - Monitor labs      Endo:   • Diagnosis: No acute issues  ? Plan:  - Monitor blood close  - Blood Glucose 120-183 Q24 hours       ID:   • Diagnosis: No acute issues  ? Plan:  - Follow labs while inpatient       MSK/Skin:   • Diagnosis: No acute issues  ? Plan:   - PT/OT as appropriate  LDA  · HARRY Mcclelland radial a line, PIV   · Plan to DC a-line    Patient appropriate for transfer out of the ICU today?: No  Disposition: Continue Critical Care   Code Status: Level 1 - Full Code  ---------------------------------------------------------------------------------------  ICU CORE MEASURES    Prophylaxis   VTE Pharmacologic Prophylaxis: Pharmacologic VTE Prophylaxis contraindicated due to recent neurosurgery   VTE Mechanical Prophylaxis: sequential compression device  Stress Ulcer Prophylaxis: Pantoprazole PO    ABCDE Protocol (if indicated)  Plan to perform spontaneous awakening trial today? Not applicable  Plan to perform spontaneous breathing trial today? Not applicable  Obvious barriers to extubation? Not applicable  CAM-ICU: Negative    Invasive Devices Review  Invasive Devices     Peripheral Intravenous Line  Duration           Peripheral IV 03/19/23 Proximal;Right;Ventral (anterior) Forearm 4 days    Peripheral IV 03/23/23 Right Hand <1 day          Arterial Line  Duration           Arterial Line 03/23/23 Left Radial <1 day          Drain  Duration           Urethral Catheter Latex; Other (Comment) 16 Fr  <1 day              Can any invasive devices be discontinued today? Yes  ---------------------------------------------------------------------------------------  OBJECTIVE    Vitals   Vitals:    23 0521 23 0530 23 0545 23 0602   BP:    170/68   Pulse: (!) 54 (!) 54 (!) 54    Resp: 12 13 (!) 11    Temp:   98 2 °F (36 8 °C)    TempSrc:   Oral    SpO2: 94% 94% 95%    Weight:       Height:         Temp (24hrs), Av 2 °F (36 8 °C), Min:97 5 °F (36 4 °C), Max:99 1 °F (37 3 °C)  Current: Temperature: 98 2 °F (36 8 °C)  HR: 54   BP: 124/79  RR: 12  SpO2: 98    Respiratory:  SpO2: SpO2: 95 %  Nasal Cannula O2 Flow Rate (L/min): 2 L/min    Invasive/non-invasive ventilation settings   Respiratory    Lab Data (Last 4 hours)    None         O2/Vent Data (Last 4 hours)    None                Physical Exam  Vitals and nursing note reviewed  Constitutional:       General: He is not in acute distress  Appearance: He is well-developed  HENT:      Head: Normocephalic and atraumatic  Eyes:      Conjunctiva/sclera: Conjunctivae normal    Cardiovascular:      Rate and Rhythm: Normal rate and regular rhythm  Heart sounds: No murmur heard  Pulmonary:      Effort: Pulmonary effort is normal  No respiratory distress  Breath sounds: Normal breath sounds  Abdominal:      Tenderness: There is no abdominal tenderness  Hernia: A hernia (umbilical) is present  Musculoskeletal:         General: No swelling  Cervical back: Neck supple  Skin:     General: Skin is warm and dry  Capillary Refill: Capillary refill takes less than 2 seconds  Neurological:      Mental Status: He is alert  Cranial Nerves: Cranial nerve deficit present  Motor: Pronator drift present        Coordination: Finger-Nose-Finger Test abnormal       Comments: CN 11 deficit on L     Left UE/LE MS 4/5    Right UE/LE MS 5/5    Sensation    Psychiatric:         Mood and Affect: Mood normal        Neurologic Physical Exam:    Mental Status  Orientation to: time, date, person, place  GCS: Eye Spontaneous = 4, Verbal Oriented = 5, Motor Obeys commands = 6, n/a  Follows commands x2: Follows commands in upper extremities and Follows commands in lower extremities  Speech: normal rate, tone and rhythm    Cranial Nerves  Pupils: 2 mm bilaterally  EOM: full and intact  Facial Symmetry: facial symmetry equal  Visual fields: 0 = No visual field loss    Motor  RUE strength: 5/5: Full ROM against gravity and full resistance  RLE strength: 5/5: Full ROM against gravity and full resistance    LUE strength: 4/5: Full ROM against gravity and moderate resistance  LLE strength: 4/5: Full ROM against gravity and moderate resistance    Sensation  RUE sensation: normal  RLE sensation: normal    LUE sensation: normal  LLE sensation: normal    Coordination  Finger-to-nose: left hand is dysmetric          Laboratory and Diagnostics:  Results from last 7 days   Lab Units 03/24/23  0436 03/23/23  0656 03/22/23  0443 03/21/23  0628 03/20/23  0136 03/19/23  1148   WBC Thousand/uL 14 43* 13 29* 12 13* 12 31*  --  8 32   HEMOGLOBIN g/dL 13 3 14 1 12 9 13 1  --  14 8   HEMATOCRIT % 39 5 42 7 38 8 39 7  --  44 5   PLATELETS Thousands/uL 263 319 310 291 287 316   NEUTROS PCT %  --   --  89*  --   --   --    BANDS PCT %  --  1  --   --   --   --    MONOS PCT %  --   --  4  --   --   --    MONO PCT %  --  5  --   --   --   --      Results from last 7 days   Lab Units 03/23/23  2341 03/23/23  0656 03/21/23  0628 03/19/23  1148   SODIUM mmol/L 137 139 139 140   POTASSIUM mmol/L 3 7 4 0 3 8 3 8   CHLORIDE mmol/L 105 110* 111* 105   CO2 mmol/L 28 26 25 26   ANION GAP mmol/L 4 3* 3* 9   BUN mg/dL 19 18 14 15   CREATININE mg/dL 0 84 0 76 0 74 0 91   CALCIUM mg/dL 8 0* 8 5 8 9 9 6   GLUCOSE RANDOM mg/dL 139 139 152* 103   ALT U/L  --  15  --   --    AST U/L  --  10  --   --    ALK PHOS U/L  --  82  --   --    ALBUMIN g/dL  --  3 7  --   --    TOTAL BILIRUBIN mg/dL  --  0 30  --   --           Results from last 7 days "  Lab Units 03/24/23  0436 03/23/23  0656 03/19/23  1148   INR  1 06 1 00 0 96   PTT seconds 24 24 27              ABG:    VBG:          Micro  Results from last 7 days   Lab Units 03/22/23  1602   MRSA CULTURE ONLY  No Methicillin Resistant Staphlyococcus aureus (MRSA) isolated       EKG:   Imaging:  I have personally reviewed pertinent reports  and I have personally reviewed pertinent films in PACS    Intake and Output  I/O       03/22 0701  03/23 0700 03/23 0701  03/24 0700    P  O   450    I V  (mL/kg)  2741 7 (33 4)    IV Piggyback  50    Total Intake(mL/kg)  3241 7 (39 4)    Urine (mL/kg/hr) 1300 (0 7) 4620 (2 3)    Blood  100    Total Output 1300 4720    Net -1300 -1478 3          Unmeasured Urine Occurrence 1 x         UOP: 192 5 ml/hr     Height and Weights   Height: 5' 9\" (175 3 cm)  IBW (Ideal Body Weight): 70 7 kg  Body mass index is 26 76 kg/m²  Weight (last 2 days)     Date/Time Weight    03/23/23 1644 82 2 (181 22)    03/23/23 1545 82 2 (181 22)            Nutrition       Diet Orders   (From admission, onward)             Start     Ordered    03/23/23 1644  Diet Regular; Regular House  Diet effective now        References:    Nutrtion Support Algorithm Enteral vs  Parenteral   Question Answer Comment   Diet Type Regular    Regular Regular House    RD to adjust diet per protocol?  Yes        03/23/23 1643                  Active Medications  Scheduled Meds:  Current Facility-Administered Medications   Medication Dose Route Frequency Provider Last Rate   • acetaminophen  975 mg Oral Q8H PRN Iris Gasca MD     • amLODIPine  10 mg Oral Daily Dottie Elizalde PA-C     • atorvastatin  40 mg Oral After Dinner Ephraim Murrieta PA-C     • bisacodyl  10 mg Rectal Daily PRN Laura Elizalde PA-C     • cefazolin  2,000 mg Intravenous Q8H Dottie Elizalde PA-C Stopped (03/24/23 0501)   • dexamethasone  4 mg Oral Q6H [facility] Dottie Elizalde PA-C      Followed by   • [START ON 3/25/2023] " "dexamethasone  4 mg Oral Q8H [facility] Dottie Elizalde PA-C      Followed by   • [START ON 3/27/2023] dexamethasone  2 mg Oral Q6H [facility] Dottie Elizalde PA-C      Followed by   • [START ON 3/30/2023] dexamethasone  2 mg Oral Q8H [facility] Dottie Elizalde PA-C      Followed by   • [START ON 3/31/2023] dexamethasone  2 mg Oral Q12H [facility] Dottie Elizalde PA-C      Followed by   • [START ON 4/3/2023] dexamethasone  2 mg Oral Q24H [facility] Dottie Elizalde PA-C     • docusate sodium  100 mg Oral BID Dottie Elizalde PA-C     • hydrALAZINE  10 mg Intravenous Q4H PRN Maria Luisa Sports, DO     • HYDROmorphone  0 2 mg Intravenous Q4H PRN Dottie Elizalde PA-C     • levETIRAcetam  500 mg Oral Q12H [facility] Dottie Elizalde PA-C     • losartan  50 mg Oral Daily Dottie Elizalde PA-C     • ondansetron  4 mg Intravenous Q6H PRN Dottie Elizalde PA-C     • oxyCODONE  2 5 mg Oral Q4H PRN Dottie Elizalde PA-C     • oxyCODONE  5 mg Oral Q4H PRN Dottie Elizalde PA-C     • pantoprazole  40 mg Oral Early Morning Dottie Elizalde PA-C     • senna  1 tablet Oral Daily Dottie Elizalde PA-C       Continuous Infusions:     PRN Meds:   acetaminophen, 975 mg, Q8H PRN  bisacodyl, 10 mg, Daily PRN  hydrALAZINE, 10 mg, Q4H PRN  HYDROmorphone, 0 2 mg, Q4H PRN  ondansetron, 4 mg, Q6H PRN  oxyCODONE, 2 5 mg, Q4H PRN  oxyCODONE, 5 mg, Q4H PRN        Allergies   No Known Allergies  ---------------------------------------------------------------------------------------  Advance Directive and Living Will:      Power of :    POLST:    ---------------------------------------------------------------------------------------  Care Time Delivered:   See attending attestation     Maria Luisa Sports, DO      Portions of the record may have been created with voice recognition software  Occasional wrong word or \"sound a like\" substitutions may have occurred due to the inherent limitations of voice recognition software    Read the chart carefully and " recognize, using context, where substitutions have occurred

## 2023-03-24 NOTE — UTILIZATION REVIEW
Continued Stay Review    Date: 3/24/23                       Current Patient Class:  Inpatient  Current Level of Care: Med Surg (on 3/24)    HPI:67 y o  male initially admitted on 3/21/23      3/24 Neurosurgery: POD#1 right frontal craniotomy image-guided for tumor  Exam: slight left upper and lower weakness 4+/5, left Df/PF 4- with concentrated effort  Continue keppra 500mg q 12hrs x 1 week for seizure ppx  Continue decadron wean as ordered [id number]  Hold all therapeutic doses of AC / AP therapy, continue to hold ASA for at least 2 weeks  DVT ppx: SCDs, okay to start lovenox this afternoon  PT/OT when able  Continue medical management and pain control per primary team  Consider heme/onc and rad/onc consults given concern for lung primary disease with RUL mass noted  Okay to transfer out of the unit today    Critical Care: 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  Hematuria s/p melton insertion  History of stroke with mild left-sided deficits appears to be at baseline  Lung nodule-right upper lobe with right hilar lymphadenopathy-oncology consultation  Exam: Sensory difficult to assess, patient had difficulty with sharp versus dull bilaterally  Overall strength 5/5  Weakness left ue with drift and weakness with shoulder shrug, decrease left plantar flexion and weaker left lower extremity compared to the right  Cranial nerves are symmetric  Speech is appropriate, patient cognitively appears to be at baseline as per his wife, he was able to do simple math questions and have good recall  Speech is without aphasia  Goal -160  Continue to monitor neurologic exam nightly 2 hours during the day and every 4 hours at night  Postoperative wound management as per neurosurgery  Plan to reimage if patient has new focality to exam or decrease in GCS by greater than 2 points  Maintain on Keppra for 1 week  Decadron taper  Postoperative MRI with expected appearance  Pathology pending    Discordance BP cuff to arterial line  Patient received multiple doses of medications for hypertension overnight  Plan to discontinue arterial line and monitor blood pressure by noninvasive cuff  Continue home amlodipine  Patient is currently held on his aspirin  He does have a history of stroke  Pending repeat imaging and neurosurgery follow-up restart aspirin  Maintain Mcclelland  Plan for transfer out of the ICU today  Urology:  Patient is postoperative day 1 right frontal craniotomy  Mcclelland catheter was unable to be inserted intraoperatively  Our service was consulted for emergent Mcclelland catheter insertion  He is now postoperative day #1 cystoscopy with complex Mcclelland catheter insertion  Renal function WNL  Urine, yellow with pink tinged  Maintain Mcclelland catheter to straight drainage  Physical Therapy discharge recommendation is post acute rehab       Vital Signs:       Date/Time Temp Pulse Resp BP MAP (mmHg) Arterial Line BP MAP SpO2 Calculated FIO2 (%) - Nasal Cannula Nasal Cannula O2 Flow Rate (L/min) O2 Device   03/24/23 14:13:11 -- 90 -- 117/68 84 -- -- 91 % -- -- --   03/24/23 12:01:35 -- 78 -- 142/77 99 -- -- 93 % -- -- --   03/24/23 12:00:30 -- 78 -- 142/77 99 -- -- 94 % -- -- --   03/24/23 1100 -- 80 21 -- -- 158/62 86 mmHg 94 % -- -- --   03/24/23 1000 -- 82 18 -- -- 136/46 70 mmHg 94 % -- -- --   03/24/23 0909 -- -- -- 176/67 Abnormal  -- -- -- -- -- -- --   03/24/23 0900 -- 78 26 Abnormal  -- -- 168/58 92 mmHg 95 % -- -- --   03/24/23 0850 -- -- -- 170/61 -- -- -- -- -- -- --   03/24/23 0800 98 5 °F (36 9 °C) 72 20 -- -- 154/60 92 mmHg 95 % -- -- None (Room air)   03/24/23 0602 -- -- -- 170/68 -- -- -- -- -- -- --   03/24/23 0545 98 2 °F (36 8 °C) 54 Abnormal  11 Abnormal  -- -- 164/66 102 mmHg 95 % -- -- --   03/24/23 0530 -- 54 Abnormal  13 -- -- 166/64 100 mmHg 94 % -- -- --   03/24/23 0521 -- 54 Abnormal  12 -- -- 166/64 98 mmHg 94 % -- -- --   03/24/23 0515 -- 54 Abnormal  12 -- -- 162/64 98 mmHg 94 % -- -- -- 03/24/23 0441 -- -- -- 161/73 -- -- -- -- -- -- --   03/24/23 0400 -- 54 Abnormal  12 -- -- 172/70 106 mmHg 94 % -- -- --   03/24/23 0300 -- 50 Abnormal  12 -- -- 162/68 100 mmHg 94 % -- -- --   03/24/23 0215 -- 52 Abnormal  14 -- -- 158/66 98 mmHg 94 % -- -- --   03/24/23 0200 -- 50 Abnormal  11 Abnormal  -- -- 164/70 102 mmHg 94 % -- -- --   03/24/23 0100 -- 52 Abnormal  11 Abnormal  -- -- 158/62 92 mmHg 92 % -- -- --   03/24/23 0030 -- 56 13 -- -- 160/68 102 mmHg 95 % -- -- --   03/24/23 0029 -- 56 16 -- -- 162/70 104 mmHg 95 % -- -- --   03/24/23 0000 -- 54 Abnormal  13 -- -- 164/68 100 mmHg 93 % -- -- --   03/23/23 2330 -- 56 14 -- -- 152/64 96 mmHg 96 % -- -- --   03/23/23 2100 -- 54 Abnormal  7 Abnormal  -- -- 146/62 90 mmHg 96 % -- -- --   03/23/23 2000 98 2 °F (36 8 °C) 52 Abnormal  12 -- -- 136/56 80 mmHg 95 % -- -- None (Room air)   03/23/23 1845 -- -- -- -- -- 154/66 96 mmHg -- -- -- --   03/23/23 1830 -- 68 22 -- -- 160/68 98 mmHg 95 % -- -- --   03/23/23 1815 -- 64 17 -- -- 156/68 98 mmHg 96 % -- -- --   03/23/23 1800 -- 68 24 Abnormal  -- -- 164/72 104 mmHg 97 % -- -- None (Room air)   03/23/23 1745 -- 56 15 -- -- 156/70 100 mmHg 95 % -- -- --   03/23/23 1730 -- 64 15 -- -- 156/78 106 mmHg 97 % -- -- --   03/23/23 1715 -- 66 20 -- -- 156/76 108 mmHg 96 % -- -- --   03/23/23 1700 -- 66 21 -- -- 154/76 104 mmHg 96 % -- -- --   03/23/23 1651 97 8 °F (36 6 °C) 60 17 -- -- 158/72 102 mmHg 97 % -- -- None (Room air)   03/23/23 1645 -- 66 21 -- -- 158/74 104 mmHg -- -- -- --   03/23/23 1600 -- 64 19 -- -- 158/74 106 mmHg 99 % 28 2 L/min Nasal cannula   03/23/23 1545 99 1 °F (37 3 °C) 66 17 144/79 101 154/77 103 mmHg 99 % -- -- --   03/23/23 1530 -- 68 17 131/91 103 154/74 102 mmHg 99 % -- -- --   03/23/23 1515 -- 66 15 141/90 116 156/74 106 mmHg 99 % -- -- --   03/23/23 1500 97 5 °F (36 4 °C) -- 14 122/94 106 -- -- 98 % 28 2 L/min Nasal cannula   03/23/23 1000 -- 98 -- -- -- -- -- -- -- -- --   03/23/23 5652 -- -- -- -- -- -- -- -- -- -- None (Room air)   03/23/23 08:55:37 -- 74 -- 172/91 Abnormal  118 -- -- 97 % -- -- --   03/23/23 06:52:44 98 2 °F (36 8 °C) 82 18 124/76  -- -- -- 97 % -- -- --   03/23/23 0208 98 1 °F (36 7 °C) 62 19 123/70 88 -- -- 94 % -- -- --   03/22/23 23:26:10 97 8 °F (36 6 °C) 59 -- 158/87 111 -- -- 96 % -- -- --   03/22/23 23:26:02 97 8 °F (36 6 °C) 98 18 158/87 111 -- -- 96 % -- -- --   03/22/23 2000 -- -- -- -- -- -- -- -- -- -- None (Room air)   03/22/23 19:13:56 98 2 °F (36 8 °C) 76 16 139/84 102 -- -- 95 % -- -- --   03/22/23 14:10:09 97 7 °F (36 5 °C) 70 18 148/87 107 -- -- 96 % -- -- --       Date and Time Eye Opening Best Verbal Response Best Motor Response Armando Coma Scale Score   03/24/23 1500 4 5 6 15   03/24/23 1200 4 5 6 15   03/24/23 1100 4 5 6 15   03/24/23 1000 4 5 6 15   03/24/23 0900 4 5 6 15   03/24/23 0800 4 5 6 15   03/24/23 0500 4 5 6 15   03/24/23 0300 4 5 6 15   03/24/23 0200 4 5 6 15   03/24/23 0100 4 5 6 15   03/23/23 2300 4 5 6 15   03/23/23 2200 4 5 6 15   03/23/23 2100 4 5 6 15   03/23/23 1800 4 5 6 15   03/23/23 1700 4 5 6 15   03/23/23 1600 4 5 6 15   03/23/23 1545 4 5 6 15   03/23/23 1530 4 5 6 15   03/23/23 1515 4 5 6 15   03/23/23 1500 4 5 6 15   03/23/23 0856 4 5 6 15   03/23/23 0000 4 5 6 15         Pertinent Labs/Diagnostic Results:       3/23 MRI brain:    1   Expected postsurgical change from right parietal craniotomy and resection of cystic mass in the frontoparietal region  2   Stable vasogenic edema surrounding the resection cavity  Right to left midline shift of 2 mm     3/23/23 Operative Report:    Procedure(s):  Right - Right frontal CRANIOTOMY IMAGE-GUIDED FOR TUMOR  EUA   DEL CASTILLO INSERTION    pecimen(s):  ID Type Source Tests Time   1 : Right Frontal Mass Tissue Brain TISSUE EXAM 3/23/2023 1313   2 : Right Frontal Mass Tissue Brain TISSUE EXAM 3/23/2023 1313      Anesthesia Type: General    Operative Findings: Frozen pathology consistent with metastatic adenocarcinoma      3/23 EKG:    Normal sinus rhythm  Possible Lateral infarct , age undetermined  Abnormal ECG        Results from last 7 days   Lab Units 03/24/23  0436 03/23/23  0656 03/22/23  0443 03/21/23  0628 03/20/23  0136 03/19/23  1148   WBC Thousand/uL 14 43* 13 29* 12 13* 12 31*  --  8 32   HEMOGLOBIN g/dL 13 3 14 1 12 9 13 1  --  14 8   HEMATOCRIT % 39 5 42 7 38 8 39 7  --  44 5   PLATELETS Thousands/uL 263 319 310 291   < > 316   NEUTROS ABS Thousands/µL  --   --  10 74*  --   --   --    BANDS PCT %  --  1  --   --   --   --     < > = values in this interval not displayed           Results from last 7 days   Lab Units 03/23/23  2341 03/23/23  0656 03/21/23  0628 03/19/23  1148   SODIUM mmol/L 137 139 139 140   POTASSIUM mmol/L 3 7 4 0 3 8 3 8   CHLORIDE mmol/L 105 110* 111* 105   CO2 mmol/L 28 26 25 26   ANION GAP mmol/L 4 3* 3* 9   BUN mg/dL 19 18 14 15   CREATININE mg/dL 0 84 0 76 0 74 0 91   EGFR ml/min/1 73sq m 89 93 94 85   CALCIUM mg/dL 8 0* 8 5 8 9 9 6     Results from last 7 days   Lab Units 03/23/23  0656   AST U/L 10   ALT U/L 15   ALK PHOS U/L 82   TOTAL PROTEIN g/dL 7 4   ALBUMIN g/dL 3 7   TOTAL BILIRUBIN mg/dL 0 30     Results from last 7 days   Lab Units 03/24/23  0617 03/24/23  0002 03/23/23  1520 03/22/23  2351 03/22/23  1936 03/22/23  1136 03/22/23  0647 03/21/23  1704 03/21/23  1104 03/20/23  2050 03/20/23  1629 03/20/23  1212   POC GLUCOSE mg/dl 129 128 140 164* 147* 183* 120 183* 233* 167* 146* 98     Results from last 7 days   Lab Units 03/23/23  2341 03/23/23  0656 03/21/23  0628 03/19/23  1148   GLUCOSE RANDOM mg/dL 139 139 152* 103         Results from last 7 days   Lab Units 03/22/23  1327 03/20/23  0136   HEMOGLOBIN A1C % 5 9* 6 0*   EAG mg/dl 123 126       Results from last 7 days   Lab Units 03/19/23  1345 03/19/23  1148   HS TNI 0HR ng/L  --  6   HS TNI 2HR ng/L 6  --    HSTNI D2 ng/L 0  --          Results from last 7 days   Lab Units 03/24/23  2111 03/23/23  0656 03/19/23  1148   PROTIME seconds 14 0 13 4 12 8   INR  1 06 1 00 0 96   PTT seconds 24 24 27     Results from last 7 days   Lab Units 03/19/23  1148   TSH 3RD GENERATON uIU/mL 2 173         Results from last 7 days   Lab Units 03/19/23  1148   BNP pg/mL 29             Medications:   Scheduled Medications:      amLODIPine, 10 mg, Oral, Daily  atorvastatin, 40 mg, Oral, After Dinner  cefazolin, 2,000 mg, Intravenous, Q8H  dexamethasone, 4 mg, Oral, Q6H [address]   Followed by  Angela Tavarez ON 3/25/2023] dexamethasone, 4 mg, Oral, Q8H [address]   Followed by  Angela Tavarez ON 3/27/2023] dexamethasone, 2 mg, Oral, Q6H [address]   Followed by  Angela Tavarez ON 3/30/2023] dexamethasone, 2 mg, Oral, Q8H [address]   Followed by  Angela Tavarez ON 3/31/2023] dexamethasone, 2 mg, Oral, Q12H [address]   Followed by  Angela Tavarez ON 4/3/2023] dexamethasone, 2 mg, Oral, Q24H [address]  docusate sodium, 100 mg, Oral, BID  enoxaparin, 40 mg, Subcutaneous, Daily  levETIRAcetam, 500 mg, Oral, Q12H LEN  losartan, 50 mg, Oral, Daily  pantoprazole, 40 mg, Oral, Early Morning  senna, 1 tablet, Oral, Daily    hydrALAZINE (APRESOLINE) injection 20 mg  Dose: 20 mg  Freq: Once Route: IV  Start: 03/24/23 0600 End: 03/24/23 0602      hydrALAZINE (APRESOLINE) injection 10 mg  Dose: 10 mg  Freq: Once Route: IV  Start: 03/24/23 0445 End: 03/24/23 0441      Continuous IV Infusions: None  PRN Meds:  acetaminophen, 975 mg, Oral, Q8H PRN  bisacodyl, 10 mg, Rectal, Daily PRN  hydrALAZINE, 10 mg, Intravenous, Q4H PRN x 1 dose 3/24 @ 0909  HYDROmorphone, 0 2 mg, Intravenous, Q4H PRN x 1 dose 3/24  ondansetron, 4 mg, Intravenous, Q6H PRN  oxyCODONE, 2 5 mg, Oral, Q4H PRN x 3 doses 3/24   oxyCODONE, 5 mg, Oral, Q4H PRN        Discharge Plan: D          Network Utilization Review Department  ATTENTION: Please call with any questions or concerns to [phone] and carefully listen to the prompts so that you are directed to the right person   All voicemails are confidential   Madina Smart all requests for admission clinical reviews, approved or denied determinations and any other requests to dedicated fax number below belonging to the campus where the patient is receiving treatment   List of dedicated fax numbers for the Facilities:  [address] [place] [address] DENIALS (Administrative/Medical Necessity) [phone]   [address] (Maternity/NICU/Pediatrics) [phone]   9[address] Geraldine [address] [phone]   [facility] 77 [phone]   [address] 28 [phone]   1551 [facility] 134 815 [facility] [phone]

## 2023-03-24 NOTE — CASE MANAGEMENT
Case Management Discharge Planning Note    Patient name Flory Prince  Location [address] [place] [address] [facility] [id number]  : 1955 Date 3/24/2023       Current Admission Date: 3/20/2023  Current Admission Diagnosis:Brain mass   Patient Active Problem List    Diagnosis Date Noted   • Mcclelland catheter in place 2023   • Lung nodule 2023   • Brain mass 2023   • Brain compression ([address] ) 2023   • Cerebral edema ([address] ) 2023   • Hypercholesterolemia 2021   • CVA (cerebral vascular accident) ([facility] ) 2021   • Essential hypertension 2020   • Annual physical exam 2020   • Negative depression screening 2020   • Overweight (BMI 25 0-29 9) 2020      LOS (days): 4  Geometric Mean LOS (GMLOS) (days): 3 80  Days to GMLOS:0 7     OBJECTIVE:  Risk of Unplanned Readmission Score: 11 9         Current admission status: Inpatient   Preferred Pharmacy:    [address]  [address]  [place] [address] [place] [address]  Phone: [phone] Fax: [phone]    Primary Care Provider: Dulce Dunlap DO    Primary Insurance: BLUE CROSS  Secondary Insurance: MEDICARE    DISCHARGE DETAILS:          Additional Comments: [facility] is following  Spouse requested they reach out to insurance  [facility] is out of network and pt doens't have any oon benefits  See [facility] note

## 2023-03-24 NOTE — ASSESSMENT & PLAN NOTE
Patient with traumatic melton insert perioperatively  Urology aware   Urine clear in color   Will leave melton in place for now; plan to leave melton catheter for 7 days and follow up with urology outpatient for void trial and removal

## 2023-03-25 LAB
ANION GAP SERPL CALCULATED.3IONS-SCNC: 4 MMOL/L (ref 4–13)
BASOPHILS # BLD MANUAL: 0 THOUSAND/UL (ref 0–0.1)
BASOPHILS NFR MAR MANUAL: 0 % (ref 0–1)
BUN SERPL-MCNC: 19 MG/DL (ref 5–25)
CALCIUM SERPL-MCNC: 8.1 MG/DL (ref 8.3–10.1)
CHLORIDE SERPL-SCNC: 108 MMOL/L (ref 96–108)
CO2 SERPL-SCNC: 25 MMOL/L (ref 21–32)
CREAT SERPL-MCNC: 0.78 MG/DL (ref 0.6–1.3)
EOSINOPHIL # BLD MANUAL: 0 THOUSAND/UL (ref 0–0.4)
EOSINOPHIL NFR BLD MANUAL: 0 % (ref 0–6)
ERYTHROCYTE [DISTWIDTH] IN BLOOD BY AUTOMATED COUNT: 12.7 % (ref 11.6–15.1)
GFR SERPL CREATININE-BSD FRML MDRD: 92 ML/MIN/1.73SQ M
GLUCOSE SERPL-MCNC: 147 MG/DL (ref 65–140)
GLUCOSE SERPL-MCNC: 150 MG/DL (ref 65–140)
GLUCOSE SERPL-MCNC: 152 MG/DL (ref 65–140)
GLUCOSE SERPL-MCNC: 155 MG/DL (ref 65–140)
GLUCOSE SERPL-MCNC: 161 MG/DL (ref 65–140)
GLUCOSE SERPL-MCNC: 171 MG/DL (ref 65–140)
HCT VFR BLD AUTO: 40.5 % (ref 36.5–49.3)
HGB BLD-MCNC: 13.5 G/DL (ref 12–17)
LYMPHOCYTES # BLD AUTO: 0.71 THOUSAND/UL (ref 0.6–4.47)
LYMPHOCYTES # BLD AUTO: 5 % (ref 14–44)
MCH RBC QN AUTO: 29.9 PG (ref 26.8–34.3)
MCHC RBC AUTO-ENTMCNC: 33.3 G/DL (ref 31.4–37.4)
MCV RBC AUTO: 90 FL (ref 82–98)
MONOCYTES # BLD AUTO: 1.42 THOUSAND/UL (ref 0–1.22)
MONOCYTES NFR BLD: 10 % (ref 4–12)
MYELOCYTES NFR BLD MANUAL: 1 % (ref 0–1)
NEUTROPHILS # BLD MANUAL: 11.34 THOUSAND/UL (ref 1.85–7.62)
NEUTS SEG NFR BLD AUTO: 80 % (ref 43–75)
PLATELET # BLD AUTO: 264 THOUSANDS/UL (ref 149–390)
PLATELET BLD QL SMEAR: ADEQUATE
PMV BLD AUTO: 10.4 FL (ref 8.9–12.7)
POLYCHROMASIA BLD QL SMEAR: PRESENT
POTASSIUM SERPL-SCNC: 4 MMOL/L (ref 3.5–5.3)
RBC # BLD AUTO: 4.52 MILLION/UL (ref 3.88–5.62)
RBC MORPH BLD: PRESENT
SODIUM SERPL-SCNC: 137 MMOL/L (ref 135–147)
VARIANT LYMPHS # BLD AUTO: 4 %
WBC # BLD AUTO: 14.18 THOUSAND/UL (ref 4.31–10.16)

## 2023-03-25 RX ADMIN — AMLODIPINE BESYLATE 10 MG: 10 TABLET ORAL at 10:30

## 2023-03-25 RX ADMIN — DEXAMETHASONE 4 MG: 4 TABLET ORAL at 00:05

## 2023-03-25 RX ADMIN — OXYCODONE HYDROCHLORIDE 5 MG: 5 TABLET ORAL at 00:05

## 2023-03-25 RX ADMIN — LEVETIRACETAM 500 MG: 500 TABLET, FILM COATED ORAL at 10:30

## 2023-03-25 RX ADMIN — DOCUSATE SODIUM 100 MG: 100 CAPSULE, LIQUID FILLED ORAL at 10:29

## 2023-03-25 RX ADMIN — DEXAMETHASONE 4 MG: 4 TABLET ORAL at 18:14

## 2023-03-25 RX ADMIN — OXYCODONE HYDROCHLORIDE 2.5 MG: 5 TABLET ORAL at 21:28

## 2023-03-25 RX ADMIN — SENNOSIDES 8.6 MG: 8.6 TABLET, FILM COATED ORAL at 10:30

## 2023-03-25 RX ADMIN — ATORVASTATIN CALCIUM 40 MG: 40 TABLET, FILM COATED ORAL at 18:14

## 2023-03-25 RX ADMIN — LEVETIRACETAM 500 MG: 500 TABLET, FILM COATED ORAL at 21:25

## 2023-03-25 RX ADMIN — HYDROMORPHONE HYDROCHLORIDE 0.2 MG: 0.2 INJECTION, SOLUTION INTRAMUSCULAR; INTRAVENOUS; SUBCUTANEOUS at 03:17

## 2023-03-25 RX ADMIN — DOCUSATE SODIUM 100 MG: 100 CAPSULE, LIQUID FILLED ORAL at 18:14

## 2023-03-25 RX ADMIN — LOSARTAN POTASSIUM 50 MG: 50 TABLET, FILM COATED ORAL at 10:30

## 2023-03-25 RX ADMIN — ACETAMINOPHEN 975 MG: 325 TABLET ORAL at 10:38

## 2023-03-25 RX ADMIN — ENOXAPARIN SODIUM 40 MG: 40 INJECTION SUBCUTANEOUS at 10:29

## 2023-03-25 RX ADMIN — DEXAMETHASONE 4 MG: 4 TABLET ORAL at 05:31

## 2023-03-25 RX ADMIN — PANTOPRAZOLE SODIUM 40 MG: 40 TABLET, DELAYED RELEASE ORAL at 05:31

## 2023-03-25 RX ADMIN — DEXAMETHASONE 4 MG: 4 TABLET ORAL at 11:38

## 2023-03-25 NOTE — PLAN OF CARE
Problem: MOBILITY - ADULT  Goal: Maintain or return to baseline ADL function  Description: INTERVENTIONS:  -  Assess patient's ability to carry out ADLs; assess patient's baseline for ADL function and identify physical deficits which impact ability to perform ADLs (bathing, care of mouth/teeth, toileting, grooming, dressing, etc )  - Assess/evaluate cause of self-care deficits   - Assess range of motion  - Assess patient's mobility; develop plan if impaired  - Assess patient's need for assistive devices and provide as appropriate  - Encourage maximum independence but intervene and supervise when necessary  - Involve family in performance of ADLs  - Assess for home care needs following discharge   - Consider OT consult to assist with ADL evaluation and planning for discharge  - Provide patient education as appropriate  Outcome: Progressing  Goal: Maintains/Returns to pre admission functional level  Description: INTERVENTIONS:  - Perform BMAT or MOVE assessment daily    - Set and communicate daily mobility goal to care team and patient/family/caregiver     - Collaborate with rehabilitation services on mobility goals if consulted  - Out of bed for toileting  - Record patient progress and toleration of activity level   Outcome: Progressing     Problem: PAIN - ADULT  Goal: Verbalizes/displays adequate comfort level or baseline comfort level  Description: Interventions:  - Encourage patient to monitor pain and request assistance  - Assess pain using appropriate pain scale  - Administer analgesics based on type and severity of pain and evaluate response  - Implement non-pharmacological measures as appropriate and evaluate response  - Consider cultural and social influences on pain and pain management  - Notify physician/advanced practitioner if interventions unsuccessful or patient reports new pain  Outcome: Progressing     Problem: INFECTION - ADULT  Goal: Absence or prevention of progression during hospitalization  Description: INTERVENTIONS:  - Assess and monitor for signs and symptoms of infection  - Monitor lab/diagnostic results  - Monitor all insertion sites, i e  indwelling lines, tubes, and drains  - Monitor endotracheal if appropriate and nasal secretions for changes in amount and color  - Roann appropriate cooling/warming therapies per order  - Administer medications as ordered  - Instruct and encourage patient and family to use good hand hygiene technique  - Identify and instruct in appropriate isolation precautions for identified infection/condition  Outcome: Progressing  Goal: Absence of fever/infection during neutropenic period  Description: INTERVENTIONS:  - Monitor WBC    Outcome: Progressing     Problem: SAFETY ADULT  Goal: Maintain or return to baseline ADL function  Description: INTERVENTIONS:  -  Assess patient's ability to carry out ADLs; assess patient's baseline for ADL function and identify physical deficits which impact ability to perform ADLs (bathing, care of mouth/teeth, toileting, grooming, dressing, etc )  - Assess/evaluate cause of self-care deficits   - Assess range of motion  - Assess patient's mobility; develop plan if impaired  - Assess patient's need for assistive devices and provide as appropriate  - Encourage maximum independence but intervene and supervise when necessary  - Involve family in performance of ADLs  - Assess for home care needs following discharge   - Consider OT consult to assist with ADL evaluation and planning for discharge  - Provide patient education as appropriate  Outcome: Progressing  Goal: Maintains/Returns to pre admission functional level  Description: INTERVENTIONS:  - Perform BMAT or MOVE assessment daily    - Set and communicate daily mobility goal to care team and patient/family/caregiver     - Collaborate with rehabilitation services on mobility goals if consulted  - Out of bed for toileting  - Record patient progress and toleration of activity level   Outcome: Progressing  Goal: Patient will remain free of falls  Description: INTERVENTIONS:  - Educate patient/family on patient safety including physical limitations  - Instruct patient to call for assistance with activity   - Consult OT/PT to assist with strengthening/mobility   - Keep Call bell within reach  - Keep bed low and locked with side rails adjusted as appropriate  - Keep care items and personal belongings within reach  - Initiate and maintain comfort rounds  - Make Fall Risk Sign visible to staff  - Apply yellow socks and bracelet for high fall risk patients  - Consider moving patient to room near nurses station  Outcome: Progressing     Problem: NEUROSENSORY - ADULT  Goal: Achieves stable or improved neurological status  Description: INTERVENTIONS  - Monitor and report changes in neurological status  - Monitor vital signs such as temperature, blood pressure, glucose, and any other labs ordered   - Initiate measures to prevent increased intracranial pressure  - Monitor for seizure activity and implement precautions if appropriate      Outcome: Progressing  Goal: Remains free of injury related to seizures activity  Description: INTERVENTIONS  - Maintain airway, patient safety  and administer oxygen as ordered  - Monitor patient for seizure activity, document and report duration and description of seizure to physician/advanced practitioner  - If seizure occurs,  ensure patient safety during seizure  - Reorient patient post seizure  - Seizure pads on all 4 side rails  - Instruct patient/family to notify RN of any seizure activity including if an aura is experienced  - Instruct patient/family to call for assistance with activity based on nursing assessment  - Administer anti-seizure medications if ordered    Outcome: Progressing  Goal: Achieves maximal functionality and self care  Description: INTERVENTIONS  - Monitor swallowing and airway patency with patient fatigue and changes in neurological status  - Encourage and assist patient to increase activity and self care     - Encourage visually impaired, hearing impaired and aphasic patients to use assistive/communication devices  Outcome: Progressing

## 2023-03-25 NOTE — PROGRESS NOTES
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    Plan:  · Exam: A&OX3, PERRL, EOMI 2 mm conj bilaterally, SF  Sangeeth[place], Left pronator drift noted, Left  weakness 4/5, left LE weakness DF/PF 4-/5,Sensation to LT intact bilaterally  DTR 2+, no clonus bilaterally  Dressing stained with darkish body fluid from incision  · Imaging reviewed personally and by attending  Final results as below:  · Postop MRI brain with and without contrast on 3/23/2023 demonstrate expected postop surgical changes from right bilateral craniotomy and resection of cystic mass in the frontoparietal region  Stable vasogenic edema surrounding the resection cavity  Right to left midline shift of 2 mm  · Pain control-Per primary team  · Mobilize as tolerated  · PT/OT eval and treatment  · DVT PPX: SCDs bilateral legs,Lovenox  No AC/APx2 weeks  · Seizure PPx on Keppra for 07 days  · Medical Mx-Per primary team, sBP<160  · Neurochecks-Close neuromonitoring, Stat CT head if GCS drops 2 pts/1H  · Decadron 4mg Q8H in tapering dose x48 hours  · Patient reports feeling better, wife asks slightly stained dressing with darkish blood from incision, no active oozing or bleeding  Will continue to monitor  Left side body weakness stable  Subjective/Objective   Chief Complaint: \" I am doing fine\"/2nd post op day progress onte    Subjective:   Patient reports he is doing fine, mild incisional site pain, wife asks darkish body fluid stained dressing, no active bleeding or drain from the incision site  Reports slight improvement in his left leg weakness  No vision issues, speech or swallowing problems  He was up and walked to the bath room  PT didn't see him yet  Denies fever, chills, rigors, cough or chest pain      Objective: Patient sitting in the recliner, communicates well " "and in no pain distress  I/O       03/23 0701  03/24 0700 03/24 0701  03/25 0700 03/25 0701  03/26 0700    P  O  450 180     I V  (mL/kg) 2741 7 (33 4)      IV Piggyback 50      Total Intake(mL/kg) 3241 7 (39 4) 180 (2 1)     Urine (mL/kg/hr) 4620 (2 3) 1300 (0 6) 1350 (10 5)    Blood 100      Total Output 4720 1300 1350    Net -[facility] 3 -1120 -1350                 Invasive Devices     Peripheral Intravenous Line  Duration           Peripheral IV 03/19/23 Proximal;Right;Ventral (anterior) Forearm 5 days    Peripheral IV 03/23/23 Right Hand 1 day          Drain  Duration           Urethral Catheter Latex; Other (Comment) 16 Fr  1 day                Physical Exam:  Vitals: Blood pressure 134/77, pulse 66, temperature 98 2 °F (36 8 °C), resp  rate 16, height 5' 9\" (1 753 m), weight 84 3 kg (185 lb 12 8 oz), SpO2 94 %  ,Body mass index is 27 44 kg/m²      Hemodynamic Monitoring: MAP: Arterial Line MAP (mmHg): 86 mmHg    General appearance: alert, appears stated age, cooperative and no distress  Head: dressing at the crani site stained with darkish body fluid  Eyes: EOMI, PERRL, 2 mm conj bilat  Neck: supple, symmetrical, trachea midline and NT  Back: no kyphosis present, no tenderness to percussion or palpation  Lungs: non labored breathing  Heart: regular heart rate  Neurologic:   Mental status: Alert, oriented x3, thought content appropriate  Cranial nerves: grossly intact (Cranial nerves II-XII)  Sensory: normal to LT throughout  Motor: See exam above  Reflexes: 2+ and symmetric, no clonus bilaterally  Coordination: finger to nose normal bilaterally, no drift bilaterally      Lab Results:  Results from last 7 days   Lab Units 03/25/23  0602 03/24/23  0436 03/23/23  0656 03/22/23  0443   WBC Thousand/uL 14 18* 14 43* 13 29* 12 13*   HEMOGLOBIN g/dL 13 5 13 3 14 1 12 9   HEMATOCRIT % 40 5 39 5 42 7 38 8   PLATELETS Thousands/uL 264 263 319 310   NEUTROS PCT %  --   --   --  89*   MONOS PCT %  --   --   --  4   MONO PCT %  " --   --  5  --      Results from last 7 days   Lab Units 03/25/23  0602 03/23/23  2341 03/23/23  0656   POTASSIUM mmol/L 4 0 3 7 4 0   CHLORIDE mmol/L 108 105 110*   CO2 mmol/L 25 28 26   BUN mg/dL 19 19 18   CREATININE mg/dL 0 78 0 84 0 76   CALCIUM mg/dL 8 1* 8 0* 8 5   ALK PHOS U/L  --   --  82   ALT U/L  --   --  15   AST U/L  --   --  10             Results from last 7 days   Lab Units 03/24/23  0436 03/23/23  0656 03/19/23  1148   INR  1 06 1 00 0 96   PTT seconds 24 24 27     No results found for: TROPONINT  ABG:No results found for: PHART, NJW0EDK, PO2ART, FAE2EWR, N5YWVUCV, BEART, SOURCE    Imaging Studies: I have personally reviewed pertinent reports  and I have personally reviewed pertinent films in PACS    EKG, Pathology, and Other Studies: I have personally reviewed pertinent reports        VTE Pharmacologic Prophylaxis: Enoxaparin (Lovenox)    VTE Mechanical Prophylaxis: sequential compression device

## 2023-03-25 NOTE — PLAN OF CARE
Problem: MOBILITY - ADULT  Goal: Maintain or return to baseline ADL function  Description: INTERVENTIONS:  -  Assess patient's ability to carry out ADLs; assess patient's baseline for ADL function and identify physical deficits which impact ability to perform ADLs (bathing, care of mouth/teeth, toileting, grooming, dressing, etc )  - Assess/evaluate cause of self-care deficits   - Assess range of motion  - Assess patient's mobility; develop plan if impaired  - Assess patient's need for assistive devices and provide as appropriate  - Encourage maximum independence but intervene and supervise when necessary  - Involve family in performance of ADLs  - Assess for home care needs following discharge   - Consider OT consult to assist with ADL evaluation and planning for discharge  - Provide patient education as appropriate  Outcome: Progressing  Goal: Maintains/Returns to pre admission functional level  Description: INTERVENTIONS:  - Perform BMAT or MOVE assessment daily    - Set and communicate daily mobility goal to care team and patient/family/caregiver  - Collaborate with rehabilitation services on mobility goals if consulted  - Perform Range of Motion  times a day  - Reposition patient every  hours    - Dangle patient  times a day  - Stand patient  times a day  - Ambulate patient  times a day  - Out of bed to chair  times a day   - Out of bed for meals  times a day  - Out of bed for toileting  - Record patient progress and toleration of activity level   Outcome: Progressing     Problem: PAIN - ADULT  Goal: Verbalizes/displays adequate comfort level or baseline comfort level  Description: Interventions:  - Encourage patient to monitor pain and request assistance  - Assess pain using appropriate pain scale  - Administer analgesics based on type and severity of pain and evaluate response  - Implement non-pharmacological measures as appropriate and evaluate response  - Consider cultural and social influences on pain and pain management  - Notify physician/advanced practitioner if interventions unsuccessful or patient reports new pain  Outcome: Progressing     Problem: INFECTION - ADULT  Goal: Absence or prevention of progression during hospitalization  Description: INTERVENTIONS:  - Assess and monitor for signs and symptoms of infection  - Monitor lab/diagnostic results  - Monitor all insertion sites, i e  indwelling lines, tubes, and drains  - Monitor endotracheal if appropriate and nasal secretions for changes in amount and color  - [place] appropriate cooling/warming therapies per order  - Administer medications as ordered  - Instruct and encourage patient and family to use good hand hygiene technique  - Identify and instruct in appropriate isolation precautions for identified infection/condition  Outcome: Progressing  Goal: Absence of fever/infection during neutropenic period  Description: INTERVENTIONS:  - Monitor WBC    Outcome: Progressing     Problem: SAFETY ADULT  Goal: Maintain or return to baseline ADL function  Description: INTERVENTIONS:  -  Assess patient's ability to carry out ADLs; assess patient's baseline for ADL function and identify physical deficits which impact ability to perform ADLs (bathing, care of mouth/teeth, toileting, grooming, dressing, etc )  - Assess/evaluate cause of self-care deficits   - Assess range of motion  - Assess patient's mobility; develop plan if impaired  - Assess patient's need for assistive devices and provide as appropriate  - Encourage maximum independence but intervene and supervise when necessary  - Involve family in performance of ADLs  - Assess for home care needs following discharge   - Consider OT consult to assist with ADL evaluation and planning for discharge  - Provide patient education as appropriate  Outcome: Progressing  Goal: Maintains/Returns to pre admission functional level  Description: INTERVENTIONS:  - Perform BMAT or MOVE assessment daily    - Set and communicate daily mobility goal to care team and patient/family/caregiver  - Collaborate with rehabilitation services on mobility goals if consulted  - Perform Range of Motion  times a day  - Reposition patient every  hours    - Dangle patient  times a day  - Stand patient  times a day  - Ambulate patient  times a day  - Out of bed to chair  times a day   - Out of bed for meals  times a day  - Out of bed for toileting  - Record patient progress and toleration of activity level   Outcome: Progressing  Goal: Patient will remain free of falls  Description: INTERVENTIONS:  - Educate patient/family on patient safety including physical limitations  - Instruct patient to call for assistance with activity   - Consult OT/PT to assist with strengthening/mobility   - Keep Call bell within reach  - Keep bed low and locked with side rails adjusted as appropriate  - Keep care items and personal belongings within reach  - Initiate and maintain comfort rounds  - Make Fall Risk Sign visible to staff  - Offer Toileting every  Hours, in advance of need  - Initiate/Maintain alarm  - Obtain necessary fall risk management equipment  - Apply yellow socks and bracelet for high fall risk patients  - Consider moving patient to room near nurses station  Outcome: Progressing     Problem: NEUROSENSORY - ADULT  Goal: Achieves stable or improved neurological status  Description: INTERVENTIONS  - Monitor and report changes in neurological status  - Monitor vital signs such as temperature, blood pressure, glucose, and any other labs ordered   - Initiate measures to prevent increased intracranial pressure  - Monitor for seizure activity and implement precautions if appropriate      Outcome: Progressing  Goal: Remains free of injury related to seizures activity  Description: INTERVENTIONS  - Maintain airway, patient safety  and administer oxygen as ordered  - Monitor patient for seizure activity, document and report duration and description of seizure to physician/advanced practitioner  - If seizure occurs,  ensure patient safety during seizure  - Reorient patient post seizure  - Seizure pads on all 4 side rails  - Instruct patient/family to notify RN of any seizure activity including if an aura is experienced  - Instruct patient/family to call for assistance with activity based on nursing assessment  - Administer anti-seizure medications if ordered    Outcome: Progressing  Goal: Achieves maximal functionality and self care  Description: INTERVENTIONS  - Monitor swallowing and airway patency with patient fatigue and changes in neurological status  - Encourage and assist patient to increase activity and self care     - Encourage visually impaired, hearing impaired and aphasic patients to use assistive/communication devices  Outcome: Progressing

## 2023-03-25 NOTE — PROGRESS NOTES
"* Brain mass  Assessment & Plan  Assessment & Plan  POD#2 Right frontal CRANIOTOMY IMAGE-GUIDED FOR TUMOR (Dr Nikolai Brady, 3/23/2023)  - R frontal brain mass w/ surrounding vasogenic edema   - presented to the [place] ED on 3/19 with progressively worsening LUE and LLE weakness  - concern for possible metastatic disease given noted RUL mass on CT c/a/p  - pt reports remote hx of prostate CA s/p prostatectomy several yrs ago and no further intervention or f/u since    Imaging:  - MRI brain 03/19: Rim-enhancing 2 4 cm centrally necrotic metastatic lesion in the right frontal lobe with surrounding severe vasogenic edema  Mass effect in the right hemisphere with approximately 2 4 mm right to left midline shift  - MRI brain 3/23: Expected postsurgical change from right parietal craniotomy and resection of cystic mass in the frontoparietal region  Stable vasogenic edema surrounding the resection cavity  Right to left midline shift of 2 mm    Plan:   · Continue to closely monitor neuro exam at this time   · frequent neuro checks q6hr   · Repeat STAT CTH with any acute decline in GCS > 2pts or more   · Maintain normotensive BP goals, SBP < 160   · Continue keppra 500mg q 12hrs x 1 week for seizure ppx  · Continue decadron wean as ordered q 48hours  · Hold all therapeutic doses of AC / AP therapy, continue to hold asa for at least 2 week  · DVT ppx: SCDs, on Lovenox   · PT/OT when able  · Continue medical management and pain control   · Heme/onc and rad/onc consults  · Social work following for assistance with dispo once medically stable/cleared       Lung nodule  Assessment & Plan  Has a 20 pk-year smoking history, quit 30 years ago  Pt has no previous hx of lung cancer screening and has no pulm symptoms  CTA head and neck initially done on 3/19/2023 incidentally showed RUL mass with partially imaged right hilar adenopathy   CT chest/abd/pelvis with contrast 3/20/2023 shows \"3 8 x 3 5 cm right upper lobe lung mass with associated " "overlying pleural tag and adjacent to pleural thickening, this may be due to pleural reaction or mild pleural invasion  No contralateral lung nodules or mass  Right hilar lymphadenopathy  Small lower right paratracheal left paratracheal lymph node do not meet the criteria for pathologic enlargement on the bases of size or morphology  There is no CT evidence of for metastatic disease in the abdomen and pelvis  No lytic destructive lesion in the visualized bony skeleton  \" which is concerning for a primary lung tumor given recently diagnosed brain mass concerning for metastasis on MRI brain  Plan:  -Consulted pulmonology  -Consulted hem/onc for management of metastatic malignancy: Recommend full body bone scan which was completed on 3/22 and showed no metastatic disease  Patient is recommended to follow-up with heme-onc 2 weeks after discharge, possible PET-CT; they will continue following and waiting for biopsy results  Melton catheter in place  Assessment & Plan  Patient with traumatic melton insert perioperatively  Urology aware   Will leave melton in place for now; plan to leave melton catheter for 7 days and follow up with urology outpatient for void trial and removal        Cerebral edema ([address] )  Assessment & Plan  - Per MRI 3/22; no increase of vasogenic edema   - Continue with Keppra and Steroid with taper per neurosurgery recommendations     CVA (cerebral vascular accident) ([address] )  Assessment & Plan  Hx CVA 11-12 years ago with mild residual L sided deficits, baseline fully functional motor  - will hold aspirin 81 mg for 2 weeks per neurosurgery recommendations     Essential hypertension  Assessment & Plan  Systolic (01ASK), YVV:745 , Min:122 , Max:176     Initial 177/98 on transfer   Continue PTA medications amlodipine 10 mg and losartan 50 mg daily, antihypertensive med given on day of surgery         VTE Pharmacologic Prophylaxis: Enoxaparin (Lovenox)  VTE Mechanical Prophylaxis: sequential " "compression device  Diet: Regular house diet   Code Status:  Disposition: Continue inpatient while awaiting placement  Discussed with attending physician, Dr Marrero, and [facility] team     Subjective:   No acute events overnight per night team    Patient states he is feeling well this morning and has no questions or concerns  He is tolerating PO diet without n/v or difficulties  Patient states he has not yet had a BM since procedure but is passing flatus and denies abdominal pain  Denies dysuria, urinary urgency, SOB, cough, chest pain, palpitaitons  Patient states he feels like the weakness in his Left UE and LE is improving  Objective:     Vitals: Blood pressure 134/77, pulse 66, temperature 98 2 °F (36 8 °C), resp  rate 16, height 5' 9\" (1 753 m), weight 84 3 kg (185 lb 12 8 oz), SpO2 94 %  ,Body mass index is 27 44 kg/m²  Intake/Output Summary (Last 24 hours) at 3/25/2023 4013  Last data filed at 3/25/2023 0800  Gross per 24 hour   Intake 180 ml   Output 2300 ml   Net -2120 ml       Physical Exam  Constitutional:       General: He is not in acute distress  Appearance: Normal appearance  HENT:      Head:      Comments: head wrap on  Cardiovascular:      Rate and Rhythm: Normal rate and regular rhythm  Pulses: Normal pulses  Heart sounds: No murmur heard  Pulmonary:      Effort: Pulmonary effort is normal  No respiratory distress  Breath sounds: Normal breath sounds  Abdominal:      General: Bowel sounds are normal       Palpations: Abdomen is soft  Tenderness: There is no abdominal tenderness  Musculoskeletal:         General: Normal range of motion  Cervical back: Normal range of motion  Right lower leg: No edema  Left lower leg: No edema  Skin:     General: Skin is warm  Neurological:      Mental Status: He is alert and oriented to person, place, and time  Motor: Weakness present  Comments: Left UE weakness 4/5, Left LE weakness 4/5   Right UE " and LE 5/5 strength  Invasive Devices     Peripheral Intravenous Line  Duration           Peripheral IV 03/19/23 Proximal;Right;Ventral (anterior) Forearm 5 days    Peripheral IV 03/23/23 Right Hand 1 day          Drain  Duration           Urethral Catheter Latex; Other (Comment) 16 Fr  1 day                           Lab and other studies:  I have personally reviewed pertinent reports  No results displayed because visit has over 200 results            Recent Results (from the past 24 hour(s))   Fingerstick Glucose (POCT)    Collection Time: 03/25/23 12:18 AM   Result Value Ref Range    POC Glucose 161 (H) 65 - 140 mg/dl   Basic metabolic panel    Collection Time: 03/25/23  6:02 AM   Result Value Ref Range    Sodium 137 135 - 147 mmol/L    Potassium 4 0 3 5 - 5 3 mmol/L    Chloride 108 96 - 108 mmol/L    CO2 25 21 - 32 mmol/L    ANION GAP 4 4 - 13 mmol/L    BUN 19 5 - 25 mg/dL    Creatinine 0 78 0 60 - 1 30 mg/dL    Glucose 152 (H) 65 - 140 mg/dL    Calcium 8 1 (L) 8 3 - 10 1 mg/dL    eGFR 92 ml/min/1 73sq m   CBC and differential    Collection Time: 03/25/23  6:02 AM   Result Value Ref Range    WBC 14 18 (H) 4 31 - 10 16 Thousand/uL    RBC 4 52 3 88 - 5 62 Million/uL    Hemoglobin 13 5 12 0 - 17 0 g/dL    Hematocrit 40 5 36 5 - 49 3 %    MCV 90 82 - 98 fL    MCH 29 9 26 8 - 34 3 pg    MCHC 33 3 31 4 - 37 4 g/dL    RDW 12 7 11 6 - 15 1 %    MPV 10 4 8 9 - 12 7 fL    Platelets 238 897 - 085 Thousands/uL   Fingerstick Glucose (POCT)    Collection Time: 03/25/23  6:12 AM   Result Value Ref Range    POC Glucose 155 (H) 65 - 140 mg/dl     Blood Culture: No results found for: BLOODCX,   Urinalysis:   Lab Results   Component Value Date    COLORU YELLOW 05/10/2018    CLARITYU CLEAR 05/10/2018    SPECGRAV 1 015 05/10/2018    PHUR 6 5 05/10/2018    LEUKOCYTESUR NEGATIVE 05/10/2018    NITRITE NEGATIVE 05/10/2018    PROTEINUA NEGATIVE 05/10/2018    GLUCOSEU NEGATIVE 05/10/2018    KETONESU NEGATIVE 05/10/2018 "BILIRUBINUR NEGATIVE 05/10/2018    BLOODU NEGATIVE 05/10/2018   ,   Urine Culture: No results found for: URINECX,   Wound Culure: No results found for: WOUNDCULT      Imaging:  CTA head and neck with and without contrast    Result Date: 3/19/2023  Right frontal lobe cystic lesion with surrounding vasogenic edema most consistent with metastatic disease given findings described below  Local mass effect on the right lateral ventricle without significant midline shift  Contrast-enhanced MRI of the brain recommended for further evaluation  Right upper lobe mass with partially imaged right hilar adenopathy  Dedicated CT of the chest, abdomen and pelvis is recommended for further evaluation  No evidence for high-grade stenosis, major branch vessel occlusion or vascular aneurysm of the cervical or intracranial vessels  I personally discussed this study with ESAU FANG on 3/19/2023 at 2:08 PM  Workstation performed: [id number]     XR chest 1 view portable    Result Date: 3/20/2023  No acute cardiopulmonary disease  Right upper lung mass  Workstation performed: [id number]     MRI brain w wo contrast    Result Date: 3/24/2023  1  Expected postsurgical change from right parietal craniotomy and resection of cystic mass in the frontoparietal region  2   Stable vasogenic edema surrounding the resection cavity  Right to left midline shift of 2 mm  Workstation performed: [id number]     MRI brain w wo contrast    Result Date: 3/19/2023  Rim-enhancing 2 4 cm centrally necrotic metastatic lesion in the right frontal lobe with surrounding severe vasogenic edema  Mass effect in the right hemisphere with approximately 2 4 mm right to left midline shift  Small focus of anterior cortical frontal acute to subacute ischemia  No evidence of acute intracranial hemorrhage  Findings were marked as \"immediate\"in Epic and will now be related to the ordering physician or covering clinical team by the radiology liaison   Workstation " performed: [id number]     NM bone scan whole body    Result Date: 3/22/2023  1  No focal tracer activity characteristic of osseous metastatic disease  Workstation performed: [id number]     CT chest abdomen pelvis w contrast    Result Date: 3/21/2023  3 8 x 3 5 cm right upper lobe lung mass with associated overlying pleural tag and adjacent to pleural thickening, this may be due to pleural reaction or mild pleural invasion No contralateral lung nodules or mass Right hilar lymphadenopathy  Small lower right paratracheal left paratracheal lymph node do not meet the criteria for pathologic enlargement on the bases of size or morphology There is no CT evidence of for metastatic disease in the abdomen and pelvis No lytic destructive lesion in the visualized bony skeleton The study was marked in EPIC for significant notification  Workstation performed: [id number]     MRI Brain BT w wo Contrast    Result Date: 3/22/2023  Unchanged 2 5 cm right parasagittal frontal lobe vertex mass with central necrosis and marked perilesional vasogenic edema unchanged  Favor metastatic disease (given lung mass) over primary high-grade glioma  Unchanged small subacute infarct in right frontal lobe  Unchanged 0 2 cm leftward midline shift  No new acute intracranial abnormality   Workstation performed: [id number]        Current Facility-Administered Medications   Medication Dose Route Frequency   • acetaminophen (TYLENOL) tablet 975 mg  975 mg Oral Q8H PRN   • amLODIPine (NORVASC) tablet 10 mg  10 mg Oral Daily   • atorvastatin (LIPITOR) tablet 40 mg  40 mg Oral After Dinner   • bisacodyl (DULCOLAX) rectal suppository 10 mg  10 mg Rectal Daily PRN   • dexamethasone (DECADRON) tablet 4 mg  4 mg Oral Q6H [facility]    Followed by   • dexamethasone (DECADRON) tablet 4 mg  4 mg Oral Q8H [facility]    Followed by   • [START ON 3/27/2023] dexamethasone (DECADRON) tablet 2 mg  2 mg Oral Q6H [facility]    Followed by   • [START ON 3/30/2023] dexamethasone (DECADRON) tablet 2 mg  2 mg Oral Q8H [address]    Followed by   • [START ON 3/31/2023] dexamethasone (DECADRON) tablet 2 mg  2 mg Oral Q12H [address]    Followed by   • [START ON 4/3/2023] dexamethasone (DECADRON) tablet 2 mg  2 mg Oral Q24H [address]   • docusate sodium (COLACE) capsule 100 mg  100 mg Oral BID   • enoxaparin (LOVENOX) subcutaneous injection 40 mg  40 mg Subcutaneous Daily   • hydrALAZINE (APRESOLINE) injection 10 mg  10 mg Intravenous Q4H PRN   • HYDROmorphone HCl (DILAUDID) injection 0 2 mg  0 2 mg Intravenous Q4H PRN   • levETIRAcetam (KEPPRA) tablet 500 mg  500 mg Oral Q12H [address]   • losartan (COZAAR) tablet 50 mg  50 mg Oral Daily   • ondansetron (ZOFRAN) injection 4 mg  4 mg Intravenous Q6H PRN   • oxyCODONE (ROXICODONE) IR tablet 2 5 mg  2 5 mg Oral Q4H PRN   • oxyCODONE (ROXICODONE) IR tablet 5 mg  5 mg Oral Q4H PRN   • pantoprazole (PROTONIX) EC tablet 40 mg  40 mg Oral Early Morning   • senna (SENOKOT) tablet 8 6 mg  1 tablet Oral Daily             Zelda Gaston  [address]  Jack PGY-1  Family Medicine

## 2023-03-26 LAB
ANION GAP SERPL CALCULATED.3IONS-SCNC: 3 MMOL/L (ref 4–13)
BASOPHILS # BLD MANUAL: 0 THOUSAND/UL (ref 0–0.1)
BASOPHILS NFR MAR MANUAL: 0 % (ref 0–1)
BUN SERPL-MCNC: 18 MG/DL (ref 5–25)
CALCIUM SERPL-MCNC: 7.7 MG/DL (ref 8.3–10.1)
CHLORIDE SERPL-SCNC: 107 MMOL/L (ref 96–108)
CO2 SERPL-SCNC: 25 MMOL/L (ref 21–32)
CREAT SERPL-MCNC: 0.67 MG/DL (ref 0.6–1.3)
EOSINOPHIL # BLD MANUAL: 0 THOUSAND/UL (ref 0–0.4)
EOSINOPHIL NFR BLD MANUAL: 0 % (ref 0–6)
ERYTHROCYTE [DISTWIDTH] IN BLOOD BY AUTOMATED COUNT: 12.7 % (ref 11.6–15.1)
GFR SERPL CREATININE-BSD FRML MDRD: 98 ML/MIN/1.73SQ M
GLUCOSE SERPL-MCNC: 116 MG/DL (ref 65–140)
GLUCOSE SERPL-MCNC: 128 MG/DL (ref 65–140)
GLUCOSE SERPL-MCNC: 145 MG/DL (ref 65–140)
GLUCOSE SERPL-MCNC: 149 MG/DL (ref 65–140)
GLUCOSE SERPL-MCNC: 157 MG/DL (ref 65–140)
HCT VFR BLD AUTO: 39.4 % (ref 36.5–49.3)
HGB BLD-MCNC: 13.4 G/DL (ref 12–17)
LYMPHOCYTES # BLD AUTO: 1.05 THOUSAND/UL (ref 0.6–4.47)
LYMPHOCYTES # BLD AUTO: 8 % (ref 14–44)
MCH RBC QN AUTO: 30.2 PG (ref 26.8–34.3)
MCHC RBC AUTO-ENTMCNC: 34 G/DL (ref 31.4–37.4)
MCV RBC AUTO: 89 FL (ref 82–98)
MONOCYTES # BLD AUTO: 1.18 THOUSAND/UL (ref 0–1.22)
MONOCYTES NFR BLD: 9 % (ref 4–12)
MYELOCYTES NFR BLD MANUAL: 4 % (ref 0–1)
NEUTROPHILS # BLD MANUAL: 10.33 THOUSAND/UL (ref 1.85–7.62)
NEUTS SEG NFR BLD AUTO: 79 % (ref 43–75)
PLATELET # BLD AUTO: 262 THOUSANDS/UL (ref 149–390)
PLATELET BLD QL SMEAR: ADEQUATE
PMV BLD AUTO: 10.2 FL (ref 8.9–12.7)
POTASSIUM SERPL-SCNC: 4 MMOL/L (ref 3.5–5.3)
RBC # BLD AUTO: 4.44 MILLION/UL (ref 3.88–5.62)
SODIUM SERPL-SCNC: 135 MMOL/L (ref 135–147)
WBC # BLD AUTO: 13.07 THOUSAND/UL (ref 4.31–10.16)

## 2023-03-26 RX ADMIN — ACETAMINOPHEN 975 MG: 325 TABLET ORAL at 09:15

## 2023-03-26 RX ADMIN — PANTOPRAZOLE SODIUM 40 MG: 40 TABLET, DELAYED RELEASE ORAL at 05:21

## 2023-03-26 RX ADMIN — DEXAMETHASONE 4 MG: 4 TABLET ORAL at 22:47

## 2023-03-26 RX ADMIN — AMLODIPINE BESYLATE 10 MG: 10 TABLET ORAL at 09:09

## 2023-03-26 RX ADMIN — LEVETIRACETAM 500 MG: 500 TABLET, FILM COATED ORAL at 22:00

## 2023-03-26 RX ADMIN — SENNOSIDES 8.6 MG: 8.6 TABLET, FILM COATED ORAL at 09:09

## 2023-03-26 RX ADMIN — ACETAMINOPHEN 975 MG: 325 TABLET ORAL at 18:18

## 2023-03-26 RX ADMIN — DEXAMETHASONE 4 MG: 4 TABLET ORAL at 05:21

## 2023-03-26 RX ADMIN — DEXAMETHASONE 4 MG: 4 TABLET ORAL at 15:42

## 2023-03-26 RX ADMIN — LEVETIRACETAM 500 MG: 500 TABLET, FILM COATED ORAL at 09:09

## 2023-03-26 RX ADMIN — ATORVASTATIN CALCIUM 40 MG: 40 TABLET, FILM COATED ORAL at 17:15

## 2023-03-26 RX ADMIN — HYDRALAZINE HYDROCHLORIDE 10 MG: 20 INJECTION, SOLUTION INTRAMUSCULAR; INTRAVENOUS at 17:22

## 2023-03-26 RX ADMIN — LOSARTAN POTASSIUM 50 MG: 50 TABLET, FILM COATED ORAL at 09:09

## 2023-03-26 RX ADMIN — ENOXAPARIN SODIUM 40 MG: 40 INJECTION SUBCUTANEOUS at 09:09

## 2023-03-26 RX ADMIN — DOCUSATE SODIUM 100 MG: 100 CAPSULE, LIQUID FILLED ORAL at 09:09

## 2023-03-26 RX ADMIN — DOCUSATE SODIUM 100 MG: 100 CAPSULE, LIQUID FILLED ORAL at 17:15

## 2023-03-26 RX ADMIN — HYDROMORPHONE HYDROCHLORIDE 0.2 MG: 0.2 INJECTION, SOLUTION INTRAMUSCULAR; INTRAVENOUS; SUBCUTANEOUS at 01:55

## 2023-03-26 NOTE — PROGRESS NOTES
Progress Notes - Family Medicine Residency, Socorro Obregon 1955, 69 y o  male  MRN: [id number]    Unit/Bed#: [facility] 729-01 Encounter: [id number]  Primary Care Provider: Ephraim Hernandez DO      Admission Date: 3/20/2023 0013  Length of Stay: 6 days  Code Status:  Level 1 - Full Code  Consult:   IP CONSULT TO NEUROSURGERY  IP CONSULT TO NEUROLOGY  IP CONSULT TO PULMONOLOGY  IP CONSULT TO ONCOLOGY  IP CONSULT TO UROLOGY  IP CONSULT TO CASE MANAGEMENT    * Brain mass  Assessment & Plan  Assessment & Plan  POD#3 Right frontal CRANIOTOMY IMAGE-GUIDED FOR TUMOR (Dr Nova Brito, 3/23/2023)  - R frontal brain mass w/ surrounding vasogenic edema   - presented to the [place] ED on 3/19 with progressively worsening LUE and LLE weakness  - 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, molecular study pending   - pt reports remote hx of prostate CA s/p prostatectomy several yrs ago and no further intervention or f/u since  - Neuro exam w/o focal deficit, LLE strenght 4 5/5, subjectively felt stronger than day before     Imaging:  - MRI brain 03/19: Rim-enhancing 2 4 cm centrally necrotic metastatic lesion in the right frontal lobe with surrounding severe vasogenic edema  Mass effect in the right hemisphere with approximately 2 4 mm right to left midline shift  - MRI brain 3/23: Expected postsurgical change from right parietal craniotomy and resection of cystic mass in the frontoparietal region  Stable vasogenic edema surrounding the resection cavity   Right to left midline shift of 2 mm    Plan:   · Continue to closely monitor neuro exam at this time   · frequent neuro checks q6hr   · Repeat STAT CTH with any acute decline in GCS > 2pts or more   · Maintain normotensive BP goals, SBP < 160   · Continue keppra 500mg q 12hrs x 1 week for seizure ppx  · Continue decadron wean as ordered q 48hours  · Hold all therapeutic doses of AC / AP therapy, continue to hold asa for at least 2 week  · DVT ppx: SCDs, "on Lovenox   · PT/OT when able  · Continue medical management and pain control   · Heme/onc and rad/onc consults  · CM following for rehab need       Melton catheter in place  Assessment & Plan  Patient with traumatic melton insert perioperatively  Urology aware   Will leave melton in place for now; plan to leave melton catheter for 7 days and follow up with urology outpatient for void trial and removal        Lung nodule  Assessment & Plan  Has a 20 pk-year smoking history, quit 30 years ago  Pt has no previous hx of lung cancer screening and has no pulm symptoms  CTA head and neck initially done on 3/19/2023 incidentally showed RUL mass with partially imaged right hilar adenopathy  CT chest/abd/pelvis with contrast 3/20/2023 shows \"3 8 x 3 5 cm right upper lobe lung mass with associated overlying pleural tag and adjacent to pleural thickening, this may be due to pleural reaction or mild pleural invasion  No contralateral lung nodules or mass  Right hilar lymphadenopathy  Small lower right paratracheal left paratracheal lymph node do not meet the criteria for pathologic enlargement on the bases of size or morphology  There is no CT evidence of for metastatic disease in the abdomen and pelvis  No lytic destructive lesion in the visualized bony skeleton  \" which is concerning for a primary lung tumor given recently diagnosed brain mass concerning for metastasis on MRI brain  Plan:  -Consulted pulmonology  -Consulted hem/onc for management of metastatic malignancy: Recommend full body bone scan which was completed on 3/22 and showed no metastatic disease  Patient is recommended to follow-up with heme-onc 2 weeks after discharge, possible PET-CT; they will continue following and waiting for biopsy results          Cerebral edema (HCC)  Assessment & Plan  - Per MRI 3/22; no increase of vasogenic edema   - Continue with Keppra and Steroid with taper per neurosurgery recommendations     CVA (cerebral vascular accident) " "([facility])  Assessment & Plan  Hx CVA 11-12 years ago with mild residual L sided deficits, baseline fully functional motor  - will hold aspirin 81 mg for 2 weeks per neurosurgery recommendations     Essential hypertension  Assessment & Plan  Systolic (60CJN), ADZ:910 , Min:137 , Max:147     Initial 177/98 on transfer  Continue PTA medications amlodipine 10 mg and losartan 50 mg daily, antihypertensive med given on day of surgery             VTE Pharmacologic Prophylaxis: Enoxaparin (Lovenox)  VTE Mechanical Prophylaxis: sequential compression device  Diet: Regular house diet  Code Status: Full code   Disposition: POD 3, placement pending     Discussed with attending physician, Dr Marrero, and  FM team       Subjective:   Seen and examined at bed side  Just completed his break fast, mild headache yesterday but improved with PRN  LLE strength improved today  Urine clear in color  Objective:     Vitals: Blood pressure 147/84, pulse 73, temperature 98 4 °F (36 9 °C), resp  rate 18, height 5' 9\" (1 753 m), weight 83 4 kg (183 lb 13 8 oz), SpO2 96 %  ,Body mass index is 27 15 kg/m²  Intake/Output Summary (Last 24 hours) at 3/26/2023 0834  Last data filed at 3/26/2023 0701  Gross per 24 hour   Intake --   Output 2200 ml   Net -2200 ml       Physical Exam  Constitutional:       Appearance: He is obese  HENT:      Head: Normocephalic  Comments: Horizontal surgical scalp wound, dressed  Right Ear: External ear normal       Left Ear: External ear normal       Nose: No congestion  Mouth/Throat:      Pharynx: No oropharyngeal exudate  Cardiovascular:      Rate and Rhythm: Normal rate and regular rhythm  Heart sounds: Normal heart sounds  No murmur heard  Pulmonary:      Effort: No respiratory distress  Breath sounds: Normal breath sounds  Abdominal:      General: There is no distension  Palpations: Abdomen is soft  Tenderness: There is no abdominal tenderness   " Musculoskeletal:         General: No swelling  Normal range of motion  Skin:     Capillary Refill: Capillary refill takes less than 2 seconds  Coloration: Skin is not jaundiced  Neurological:      General: No focal deficit present  Mental Status: He is alert  Sensory: No sensory deficit  Coordination: Coordination normal       Comments: CN II-XII Intact  LLE strength 4 5/5 (Improved from day before)    Psychiatric:         Mood and Affect: Mood normal          Invasive Devices     Peripheral Intravenous Line  Duration           Peripheral IV 03/23/23 Right Hand 2 days          Drain  Duration           Urethral Catheter Latex; Other (Comment) 16 Fr  2 days                           Lab and other studies:  I have personally reviewed pertinent reports  No results displayed because visit has over 200 results            Recent Results (from the past 24 hour(s))   Fingerstick Glucose (POCT)    Collection Time: 03/25/23 10:51 AM   Result Value Ref Range    POC Glucose 150 (H) 65 - 140 mg/dl   Fingerstick Glucose (POCT)    Collection Time: 03/25/23  4:02 PM   Result Value Ref Range    POC Glucose 171 (H) 65 - 140 mg/dl   Fingerstick Glucose (POCT)    Collection Time: 03/25/23  8:55 PM   Result Value Ref Range    POC Glucose 147 (H) 65 - 140 mg/dl   CBC and differential    Collection Time: 03/26/23  4:42 AM   Result Value Ref Range    WBC 13 07 (H) 4 31 - 10 16 Thousand/uL    RBC 4 44 3 88 - 5 62 Million/uL    Hemoglobin 13 4 12 0 - 17 0 g/dL    Hematocrit 39 4 36 5 - 49 3 %    MCV 89 82 - 98 fL    MCH 30 2 26 8 - 34 3 pg    MCHC 34 0 31 4 - 37 4 g/dL    RDW 12 7 11 6 - 15 1 %    MPV 10 2 8 9 - 12 7 fL    Platelets 103 922 - 388 Thousands/uL   Basic metabolic panel    Collection Time: 03/26/23  4:42 AM   Result Value Ref Range    Sodium 135 135 - 147 mmol/L    Potassium 4 0 3 5 - 5 3 mmol/L    Chloride 107 96 - 108 mmol/L    CO2 25 21 - 32 mmol/L    ANION GAP 3 (L) 4 - 13 mmol/L    BUN 18 5 - 25 mg/dL    Creatinine 0 67 0 60 - 1 30 mg/dL    Glucose 149 (H) 65 - 140 mg/dL    Calcium 7 7 (L) 8 3 - 10 1 mg/dL    eGFR 98 ml/min/1 73sq m   Manual Differential(PHLEBS Do Not Order)    Collection Time: 03/26/23  4:42 AM   Result Value Ref Range    Segmented % 79 (H) 43 - 75 %    Lymphocytes % 8 (L) 14 - 44 %    Monocytes % 9 4 - 12 %    Eosinophils, % 0 0 - 6 %    Basophils % 0 0 - 1 %    Myelocytes % 4 (H) 0 - 1 %    Absolute Neutrophils 10 33 (H) 1 85 - 7 62 Thousand/uL    Lymphocytes Absolute 1 05 0 60 - 4 47 Thousand/uL    Monocytes Absolute 1 18 0 00 - 1 22 Thousand/uL    Eosinophils Absolute 0 00 0 00 - 0 40 Thousand/uL    Basophils Absolute 0 00 0 00 - 0 10 Thousand/uL    Total Counted      Platelet Estimate Adequate Adequate   Fingerstick Glucose (POCT)    Collection Time: 03/26/23  6:19 AM   Result Value Ref Range    POC Glucose 128 65 - 140 mg/dl     Blood Culture: No results found for: BLOODCX,   Urinalysis:   Lab Results   Component Value Date    COLORU YELLOW 05/10/2018    CLARITYU CLEAR 05/10/2018    SPECGRAV 1 015 05/10/2018    PHUR 6 5 05/10/2018    LEUKOCYTESUR NEGATIVE 05/10/2018    NITRITE NEGATIVE 05/10/2018    PROTEINUA NEGATIVE 05/10/2018    GLUCOSEU NEGATIVE 05/10/2018    KETONESU NEGATIVE 05/10/2018    BILIRUBINUR NEGATIVE 05/10/2018    BLOODU NEGATIVE 05/10/2018   ,   Urine Culture: No results found for: URINECX,   Wound Culure: No results found for: WOUNDCULT      Imaging:  None  CTA head and neck with and without contrast    Result Date: 3/19/2023  Right frontal lobe cystic lesion with surrounding vasogenic edema most consistent with metastatic disease given findings described below  Local mass effect on the right lateral ventricle without significant midline shift  Contrast-enhanced MRI of the brain recommended for further evaluation  Right upper lobe mass with partially imaged right hilar adenopathy  Dedicated CT of the chest, abdomen and pelvis is recommended for further evaluation  "No evidence for high-grade stenosis, major branch vessel occlusion or vascular aneurysm of the cervical or intracranial vessels  I personally discussed this study with ESAU FANG on 3/19/2023 at 2:08 PM  Workstation performed: [id number]     XR chest 1 view portable    Result Date: 3/20/2023  No acute cardiopulmonary disease  Right upper lung mass  Workstation performed: [id number]     MRI brain w wo contrast    Result Date: 3/24/2023  1  Expected postsurgical change from right parietal craniotomy and resection of cystic mass in the frontoparietal region  2   Stable vasogenic edema surrounding the resection cavity  Right to left midline shift of 2 mm  Workstation performed: [id number]     MRI brain w wo contrast    Result Date: 3/19/2023  Rim-enhancing 2 4 cm centrally necrotic metastatic lesion in the right frontal lobe with surrounding severe vasogenic edema  Mass effect in the right hemisphere with approximately 2 4 mm right to left midline shift  Small focus of anterior cortical frontal acute to subacute ischemia  No evidence of acute intracranial hemorrhage  Findings were marked as \"immediate\"in Epic and will now be related to the ordering physician or covering clinical team by the radiology liaison  Workstation performed: [id number]     NM bone scan whole body    Result Date: 3/22/2023  1  No focal tracer activity characteristic of osseous metastatic disease  Workstation performed: [id number]     CT chest abdomen pelvis w contrast    Result Date: 3/21/2023  3 8 x 3 5 cm right upper lobe lung mass with associated overlying pleural tag and adjacent to pleural thickening, this may be due to pleural reaction or mild pleural invasion No contralateral lung nodules or mass Right hilar lymphadenopathy    Small lower right paratracheal left paratracheal lymph node do not meet the criteria for pathologic enlargement on the bases of size or morphology There is no CT evidence of for metastatic disease in the abdomen " and pelvis No lytic destructive lesion in the visualized bony skeleton The study was marked in EPIC for significant notification  Workstation performed: [id number]     MRI Brain BT w wo Contrast    Result Date: 3/22/2023  Unchanged 2 5 cm right parasagittal frontal lobe vertex mass with central necrosis and marked perilesional vasogenic edema unchanged  Favor metastatic disease (given lung mass) over primary high-grade glioma  Unchanged small subacute infarct in right frontal lobe  Unchanged 0 2 cm leftward midline shift  No new acute intracranial abnormality   Workstation performed: [id number]        Current Facility-Administered Medications   Medication Dose Route Frequency   • acetaminophen (TYLENOL) tablet 975 mg  975 mg Oral Q8H PRN   • amLODIPine (NORVASC) tablet 10 mg  10 mg Oral Daily   • atorvastatin (LIPITOR) tablet 40 mg  40 mg Oral After Dinner   • bisacodyl (DULCOLAX) rectal suppository 10 mg  10 mg Rectal Daily PRN   • dexamethasone (DECADRON) tablet 4 mg  4 mg Oral Q8H [facility]    Followed by   • [START ON 3/27/2023] dexamethasone (DECADRON) tablet 2 mg  2 mg Oral Q6H [facility]    Followed by   • [START ON 3/30/2023] dexamethasone (DECADRON) tablet 2 mg  2 mg Oral Q8H [facility]    Followed by   • [START ON 3/31/2023] dexamethasone (DECADRON) tablet 2 mg  2 mg Oral Q12H [facility]    Followed by   • [START ON 4/3/2023] dexamethasone (DECADRON) tablet 2 mg  2 mg Oral Q24H [facility]   • docusate sodium (COLACE) capsule 100 mg  100 mg Oral BID   • enoxaparin (LOVENOX) subcutaneous injection 40 mg  40 mg Subcutaneous Daily   • hydrALAZINE (APRESOLINE) injection 10 mg  10 mg Intravenous Q4H PRN   • HYDROmorphone HCl (DILAUDID) injection 0 2 mg  0 2 mg Intravenous Q4H PRN   • levETIRAcetam (KEPPRA) tablet 500 mg  500 mg Oral Q12H [facility]   • losartan (COZAAR) tablet 50 mg  50 mg Oral Daily   • ondansetron (ZOFRAN) injection 4 mg  4 mg Intravenous Q6H PRN   • oxyCODONE (ROXICODONE) IR tablet 2 5 mg  2 5 mg Oral Q4H PRN   • oxyCODONE (ROXICODONE) IR tablet 5 mg  5 mg Oral Q4H PRN   • pantoprazole (PROTONIX) EC tablet 40 mg  40 mg Oral Early Morning   • senna (SENOKOT) tablet 8 6 mg  1 tablet Oral Daily             Bria De La Cruz DO   Family Medicine

## 2023-03-26 NOTE — PLAN OF CARE
Problem: MOBILITY - ADULT  Goal: Maintain or return to baseline ADL function  Description: INTERVENTIONS:  -  Assess patient's ability to carry out ADLs; assess patient's baseline for ADL function and identify physical deficits which impact ability to perform ADLs (bathing, care of mouth/teeth, toileting, grooming, dressing, etc )  - Assess/evaluate cause of self-care deficits   - Assess range of motion  - Assess patient's mobility; develop plan if impaired  - Assess patient's need for assistive devices and provide as appropriate  - Encourage maximum independence but intervene and supervise when necessary  - Involve family in performance of ADLs  - Assess for home care needs following discharge   - Consider OT consult to assist with ADL evaluation and planning for discharge  - Provide patient education as appropriate  Outcome: Progressing  Goal: Maintains/Returns to pre admission functional level  Description: INTERVENTIONS:  - Perform BMAT or MOVE assessment daily    - Set and communicate daily mobility goal to care team and patient/family/caregiver     - Collaborate with rehabilitation services on mobility goals if consulted  - Out of bed for toileting  - Record patient progress and toleration of activity level   Outcome: Progressing     Problem: PAIN - ADULT  Goal: Verbalizes/displays adequate comfort level or baseline comfort level  Description: Interventions:  - Encourage patient to monitor pain and request assistance  - Assess pain using appropriate pain scale  - Administer analgesics based on type and severity of pain and evaluate response  - Implement non-pharmacological measures as appropriate and evaluate response  - Consider cultural and social influences on pain and pain management  - Notify physician/advanced practitioner if interventions unsuccessful or patient reports new pain  Outcome: Progressing     Problem: INFECTION - ADULT  Goal: Absence or prevention of progression during hospitalization  Description: INTERVENTIONS:  - Assess and monitor for signs and symptoms of infection  - Monitor lab/diagnostic results  - Monitor all insertion sites, i e  indwelling lines, tubes, and drains  - Monitor endotracheal if appropriate and nasal secretions for changes in amount and color  - [place] appropriate cooling/warming therapies per order  - Administer medications as ordered  - Instruct and encourage patient and family to use good hand hygiene technique  - Identify and instruct in appropriate isolation precautions for identified infection/condition  Outcome: Progressing  Goal: Absence of fever/infection during neutropenic period  Description: INTERVENTIONS:  - Monitor WBC    Outcome: Progressing     Problem: SAFETY ADULT  Goal: Maintain or return to baseline ADL function  Description: INTERVENTIONS:  -  Assess patient's ability to carry out ADLs; assess patient's baseline for ADL function and identify physical deficits which impact ability to perform ADLs (bathing, care of mouth/teeth, toileting, grooming, dressing, etc )  - Assess/evaluate cause of self-care deficits   - Assess range of motion  - Assess patient's mobility; develop plan if impaired  - Assess patient's need for assistive devices and provide as appropriate  - Encourage maximum independence but intervene and supervise when necessary  - Involve family in performance of ADLs  - Assess for home care needs following discharge   - Consider OT consult to assist with ADL evaluation and planning for discharge  - Provide patient education as appropriate  Outcome: Progressing  Goal: Maintains/Returns to pre admission functional level  Description: INTERVENTIONS:  - Perform BMAT or MOVE assessment daily    - Set and communicate daily mobility goal to care team and patient/family/caregiver     - Collaborate with rehabilitation services on mobility goals if consulted  - Out of bed for toileting  - Record patient progress and toleration of activity level   Outcome: Progressing  Goal: Patient will remain free of falls  Description: INTERVENTIONS:  -  Assess patient's ability to carry out ADLs; assess patient's baseline for ADL function and identify physical deficits which impact ability to perform ADLs (bathing, care of mouth/teeth, toileting, grooming, dressing, etc )  - Assess/evaluate cause of self-care deficits   - Assess range of motion  - Assess patient's mobility; develop plan if impaired  - Assess patient's need for assistive devices and provide as appropriate  - Encourage maximum independence but intervene and supervise when necessary  - Involve family in performance of ADLs  - Assess for home care needs following discharge   - Consider OT consult to assist with ADL evaluation and planning for discharge  - Provide patient education as appropriate  Outcome: Progressing     Problem: NEUROSENSORY - ADULT  Goal: Achieves stable or improved neurological status  Description: INTERVENTIONS  - Monitor and report changes in neurological status  - Monitor vital signs such as temperature, blood pressure, glucose, and any other labs ordered   - Initiate measures to prevent increased intracranial pressure  - Monitor for seizure activity and implement precautions if appropriate      Outcome: Progressing  Goal: Remains free of injury related to seizures activity  Description: INTERVENTIONS  - Maintain airway, patient safety  and administer oxygen as ordered  - Monitor patient for seizure activity, document and report duration and description of seizure to physician/advanced practitioner  - If seizure occurs,  ensure patient safety during seizure  - Reorient patient post seizure  - Seizure pads on all 4 side rails  - Instruct patient/family to notify RN of any seizure activity including if an aura is experienced  - Instruct patient/family to call for assistance with activity based on nursing assessment  - Administer anti-seizure medications if ordered    Outcome: Progressing  Goal: Achieves maximal functionality and self care  Description: INTERVENTIONS  - Monitor swallowing and airway patency with patient fatigue and changes in neurological status  - Encourage and assist patient to increase activity and self care     - Encourage visually impaired, hearing impaired and aphasic patients to use assistive/communication devices  Outcome: Progressing

## 2023-03-26 NOTE — PROGRESS NOTES
"Progress Note - Neurosurgery   Mariela Gasca 69 y o  male MRN: [id number]  Unit/Bed#: [facility] 729-01 Encounter: [id number]      Assessment:  1  POD# 3 RF Craniotomy for tumor Reva Quiroz 3/23/23)   2  Hx of brain mass , most likely 2/2 mets     Plan:  • Exam: A&OX3, PERRL, EOMI 2 mm conj bilaterally, SF  Sangeetha, Left pronator drift noted, Left  weakness 4/5, left LE weakness DF/PF 4-/5,Sensation to LT intact bilaterally  DTR 2+, no clonus bilaterally  Dressing stained with darkish body fluid from incision  • Imaging reviewed personally and by attending  Final results as below:  ? Postop MRI brain with and without contrast on 3/23/2023 demonstrate expected postop surgical changes from right bilateral craniotomy and resection of cystic mass in the frontoparietal region  Stable vasogenic edema surrounding the resection cavity  Right to left midline shift of 2 mm  • Pain control-Per primary team  • Mobilize as tolerated  • PT/OT eval and treatment  • DVT PPX: SCDs bilateral legs,Lovenox  No AC/APx2 weeks  • Seizure PPx on Keppra for 07 days  • Medical Mx-Per primary team, sBP<160  • Neurochecks-Close neuromonitoring, Stat CT head if GCS drops 2 pts/1H  • Decadron 4mg Q8H in tapering dose x48 hours  • Patient reports feeling better, left DF/PF weakness, incision slight reddish blood stained the dressing, new dressing applied, no active oozing or bleeding  Will continue to monitor  Left side body weakness stable  PT recommends post acute rehab  Consider s/o  Subjective/Objective   Chief Complaint: \" I am doing better\"post op progress note    Subjective: Patient reports doing better, mild crani wound pain, left foot dorsiflexion and plantarflexion weakness, denies any nausea, vomiting, blurry vision or diplopia  No fever, chills, rigors, cough or chest pain  He says he was out of bed and walked around doing physical therapy but drags his left foot    PT recommended his postacute rehab    Objective: Patient in recliner,, " "he is well and in no pain distressin     I/O       03/24 0701  03/25 0700 03/25 0701  03/26 0700 03/26 0701  03/27 0700    P  O  180      I V  (mL/kg)       IV Piggyback       Total Intake(mL/kg) 180 (2 1)      Urine (mL/kg/hr) 1300 (0 6) 2600 (1 3) 1750 (2)    Blood       Total Output 1300 2600 1750    Net -1120 -2600 -1750                 Invasive Devices     Peripheral Intravenous Line  Duration           Peripheral IV 03/26/23 Distal;Right;Ventral (anterior) Forearm <1 day          Drain  Duration           Urethral Catheter Latex; Other (Comment) 16 Fr  3 days                Physical Exam:  Vitals: Blood pressure 162/91, pulse 77, temperature 97 9 °F (36 6 °C), resp  rate 18, height 5' 9\" (1 753 m), weight 83 4 kg (183 lb 13 8 oz), SpO2 94 %  ,Body mass index is 27 15 kg/m²      Hemodynamic Monitoring: MAP: Arterial Line MAP (mmHg): 86 mmHg    General appearance: alert, appears stated age, cooperative and no distress  Head: Crani wound slight reddish blood dressing applied  Eyes: EOMI, PERRL 2 mm: Conjugate bilaterally  Neck: supple, symmetrical, trachea midline and NT  Back: no kyphosis present, no tenderness to percussion or palpation  Lungs: non labored breathing  Heart: regular heart rate  Neurologic:   Mental status: Alert, oriented x3, thought content appropriate  Cranial nerves: grossly intact (Cranial nerves II-XII)  Sensory: normal to light touch throughout  Motor: See exam above  Reflexes: 2+ and symmetric  Coordination: finger to nose normal bilaterally, no drift bilaterally      Lab Results:  Results from last 7 days   Lab Units 03/26/23  0442 03/25/23  0602 03/24/23  0436 03/23/23  0656 03/22/23  0443   WBC Thousand/uL 13 07* 14 18* 14 43* 13 29* 12 13*   HEMOGLOBIN g/dL 13 4 13 5 13 3 14 1 12 9   HEMATOCRIT % 39 4 40 5 39 5 42 7 38 8   PLATELETS Thousands/uL 262 264 263 319 310   NEUTROS PCT %  --   --   --   --  89*   MONOS PCT %  --   --   --   --  4   MONO PCT % 9 10  --  5  --      Results from " last 7 days   Lab Units 03/26/23  0442 03/25/23  0602 03/23/23  2341 03/23/23  0656   POTASSIUM mmol/L 4 0 4 0 3 7 4 0   CHLORIDE mmol/L 107 108 105 110*   CO2 mmol/L 25 25 28 26   BUN mg/dL 18 19 19 18   CREATININE mg/dL 0 67 0 78 0 84 0 76   CALCIUM mg/dL 7 7* 8 1* 8 0* 8 5   ALK PHOS U/L  --   --   --  82   ALT U/L  --   --   --  15   AST U/L  --   --   --  10             Results from last 7 days   Lab Units 03/24/23  0436 03/23/23  0656   INR  1 06 1 00   PTT seconds 24 24     No results found for: TROPONINT  ABG:No results found for: PHART, PQF2EKW, PO2ART, TGF5MYW, T7UDVDIV, BEART, SOURCE    Imaging Studies: I have personally reviewed pertinent reports  and I have personally reviewed pertinent films in PACS    EKG, Pathology, and Other Studies: I have personally reviewed pertinent reports        VTE Pharmacologic Prophylaxis: Enoxaparin (Lovenox)    VTE Mechanical Prophylaxis: sequential compression device

## 2023-03-26 NOTE — PLAN OF CARE
Problem: MOBILITY - ADULT  Goal: Maintain or return to baseline ADL function  Description: INTERVENTIONS:  -  Assess patient's ability to carry out ADLs; assess patient's baseline for ADL function and identify physical deficits which impact ability to perform ADLs (bathing, care of mouth/teeth, toileting, grooming, dressing, etc )  - Assess/evaluate cause of self-care deficits   - Assess range of motion  - Assess patient's mobility; develop plan if impaired  - Assess patient's need for assistive devices and provide as appropriate  - Encourage maximum independence but intervene and supervise when necessary  - Involve family in performance of ADLs  - Assess for home care needs following discharge   - Consider OT consult to assist with ADL evaluation and planning for discharge  - Provide patient education as appropriate  Outcome: Progressing  Goal: Maintains/Returns to pre admission functional level  Description: INTERVENTIONS:  - Perform BMAT or MOVE assessment daily    - Set and communicate daily mobility goal to care team and patient/family/caregiver  - Collaborate with rehabilitation services on mobility goals if consulted  - Perform Range of Motion  times a day  - Reposition patient every  hours    - Dangle patient  times a day  - Stand patient  times a day  - Ambulate patient  times a day  - Out of bed to chair  times a day   - Out of bed for meals  times a day  - Out of bed for toileting  - Record patient progress and toleration of activity level   Outcome: Progressing     Problem: PAIN - ADULT  Goal: Verbalizes/displays adequate comfort level or baseline comfort level  Description: Interventions:  - Encourage patient to monitor pain and request assistance  - Assess pain using appropriate pain scale  - Administer analgesics based on type and severity of pain and evaluate response  - Implement non-pharmacological measures as appropriate and evaluate response  - Consider cultural and social influences on pain and pain management  - Notify physician/advanced practitioner if interventions unsuccessful or patient reports new pain  Outcome: Progressing     Problem: INFECTION - ADULT  Goal: Absence or prevention of progression during hospitalization  Description: INTERVENTIONS:  - Assess and monitor for signs and symptoms of infection  - Monitor lab/diagnostic results  - Monitor all insertion sites, i e  indwelling lines, tubes, and drains  - Monitor endotracheal if appropriate and nasal secretions for changes in amount and color  - Jackson appropriate cooling/warming therapies per order  - Administer medications as ordered  - Instruct and encourage patient and family to use good hand hygiene technique  - Identify and instruct in appropriate isolation precautions for identified infection/condition  Outcome: Progressing  Goal: Absence of fever/infection during neutropenic period  Description: INTERVENTIONS:  - Monitor WBC    Outcome: Progressing     Problem: SAFETY ADULT  Goal: Maintain or return to baseline ADL function  Description: INTERVENTIONS:  -  Assess patient's ability to carry out ADLs; assess patient's baseline for ADL function and identify physical deficits which impact ability to perform ADLs (bathing, care of mouth/teeth, toileting, grooming, dressing, etc )  - Assess/evaluate cause of self-care deficits   - Assess range of motion  - Assess patient's mobility; develop plan if impaired  - Assess patient's need for assistive devices and provide as appropriate  - Encourage maximum independence but intervene and supervise when necessary  - Involve family in performance of ADLs  - Assess for home care needs following discharge   - Consider OT consult to assist with ADL evaluation and planning for discharge  - Provide patient education as appropriate  Outcome: Progressing  Goal: Maintains/Returns to pre admission functional level  Description: INTERVENTIONS:  - Perform BMAT or MOVE assessment daily    - Set and communicate daily mobility goal to care team and patient/family/caregiver  - Collaborate with rehabilitation services on mobility goals if consulted  - Perform Range of Motion  times a day  - Reposition patient every  hours    - Dangle patient  times a day  - Stand patient  times a day  - Ambulate patient  times a day  - Out of bed to chair  times a day   - Out of bed for meals  times a day  - Out of bed for toileting  - Record patient progress and toleration of activity level   Outcome: Progressing  Goal: Patient will remain free of falls  Description: INTERVENTIONS:  - Educate patient/family on patient safety including physical limitations  - Instruct patient to call for assistance with activity   - Consult OT/PT to assist with strengthening/mobility   - Keep Call bell within reach  - Keep bed low and locked with side rails adjusted as appropriate  - Keep care items and personal belongings within reach  - Initiate and maintain comfort rounds  - Make Fall Risk Sign visible to staff  - Offer Toileting every  Hours, in advance of need  - Initiate/Maintain alarm  - Obtain necessary fall risk management equipment  - Apply yellow socks and bracelet for high fall risk patients  - Consider moving patient to room near nurses station  Outcome: Progressing     Problem: NEUROSENSORY - ADULT  Goal: Achieves stable or improved neurological status  Description: INTERVENTIONS  - Monitor and report changes in neurological status  - Monitor vital signs such as temperature, blood pressure, glucose, and any other labs ordered   - Initiate measures to prevent increased intracranial pressure  - Monitor for seizure activity and implement precautions if appropriate      Outcome: Progressing  Goal: Remains free of injury related to seizures activity  Description: INTERVENTIONS  - Maintain airway, patient safety  and administer oxygen as ordered  - Monitor patient for seizure activity, document and report duration and description of seizure to physician/advanced practitioner  - If seizure occurs,  ensure patient safety during seizure  - Reorient patient post seizure  - Seizure pads on all 4 side rails  - Instruct patient/family to notify RN of any seizure activity including if an aura is experienced  - Instruct patient/family to call for assistance with activity based on nursing assessment  - Administer anti-seizure medications if ordered    Outcome: Progressing  Goal: Achieves maximal functionality and self care  Description: INTERVENTIONS  - Monitor swallowing and airway patency with patient fatigue and changes in neurological status  - Encourage and assist patient to increase activity and self care     - Encourage visually impaired, hearing impaired and aphasic patients to use assistive/communication devices  Outcome: Progressing

## 2023-03-27 ENCOUNTER — TELEPHONE (OUTPATIENT)
Dept: HEMATOLOGY ONCOLOGY | Facility: CLINIC | Age: 68
End: 2023-03-27

## 2023-03-27 ENCOUNTER — PATIENT OUTREACH (OUTPATIENT)
Dept: HEMATOLOGY ONCOLOGY | Facility: CLINIC | Age: 68
End: 2023-03-27

## 2023-03-27 ENCOUNTER — TELEPHONE (OUTPATIENT)
Dept: NEUROSURGERY | Facility: CLINIC | Age: 68
End: 2023-03-27

## 2023-03-27 ENCOUNTER — PATIENT OUTREACH (OUTPATIENT)
Dept: CARDIAC SURGERY | Facility: CLINIC | Age: 68
End: 2023-03-27

## 2023-03-27 LAB
GLUCOSE SERPL-MCNC: 145 MG/DL (ref 65–140)
GLUCOSE SERPL-MCNC: 164 MG/DL (ref 65–140)
GLUCOSE SERPL-MCNC: 230 MG/DL (ref 65–140)

## 2023-03-27 RX ORDER — LANOLIN ALCOHOL/MO/W.PET/CERES
3 CREAM (GRAM) TOPICAL
Status: DISCONTINUED | OUTPATIENT
Start: 2023-03-27 | End: 2023-03-30 | Stop reason: HOSPADM

## 2023-03-27 RX ADMIN — DOCUSATE SODIUM 100 MG: 100 CAPSULE, LIQUID FILLED ORAL at 08:01

## 2023-03-27 RX ADMIN — ACETAMINOPHEN 975 MG: 325 TABLET ORAL at 06:07

## 2023-03-27 RX ADMIN — DEXAMETHASONE 4 MG: 4 TABLET ORAL at 05:47

## 2023-03-27 RX ADMIN — AMLODIPINE BESYLATE 10 MG: 10 TABLET ORAL at 08:01

## 2023-03-27 RX ADMIN — LEVETIRACETAM 500 MG: 500 TABLET, FILM COATED ORAL at 08:01

## 2023-03-27 RX ADMIN — ATORVASTATIN CALCIUM 40 MG: 40 TABLET, FILM COATED ORAL at 17:21

## 2023-03-27 RX ADMIN — DOCUSATE SODIUM 100 MG: 100 CAPSULE, LIQUID FILLED ORAL at 17:21

## 2023-03-27 RX ADMIN — LOSARTAN POTASSIUM 50 MG: 50 TABLET, FILM COATED ORAL at 08:01

## 2023-03-27 RX ADMIN — DEXAMETHASONE 4 MG: 4 TABLET ORAL at 13:00

## 2023-03-27 RX ADMIN — ACETAMINOPHEN 975 MG: 325 TABLET ORAL at 13:00

## 2023-03-27 RX ADMIN — HYDRALAZINE HYDROCHLORIDE 10 MG: 20 INJECTION, SOLUTION INTRAMUSCULAR; INTRAVENOUS at 22:13

## 2023-03-27 RX ADMIN — MELATONIN 3 MG: at 21:49

## 2023-03-27 RX ADMIN — ENOXAPARIN SODIUM 40 MG: 40 INJECTION SUBCUTANEOUS at 08:01

## 2023-03-27 RX ADMIN — DEXAMETHASONE 2 MG: 2 TABLET ORAL at 21:49

## 2023-03-27 RX ADMIN — SENNOSIDES 8.6 MG: 8.6 TABLET, FILM COATED ORAL at 08:01

## 2023-03-27 RX ADMIN — PANTOPRAZOLE SODIUM 40 MG: 40 TABLET, DELAYED RELEASE ORAL at 05:47

## 2023-03-27 RX ADMIN — LEVETIRACETAM 500 MG: 500 TABLET, FILM COATED ORAL at 21:49

## 2023-03-27 RX ADMIN — ACETAMINOPHEN 975 MG: 325 TABLET ORAL at 23:46

## 2023-03-27 NOTE — PROGRESS NOTES
Intake received  Chart reviewed  Patient is currently inpatient  Patient presented with left hemiparesis, mild headache, chronic cough and exertional dyspnea  On admission he was found to have mass in the brain on the right side with edema and also mass in right upper lung 3 8 x 3 5 cm with adjacent pleural thickening and right hilar lymphadenopathy and has small lower right paratracheal lymph node  He underwent right frontal craniotomy on 3/23/23 with pathology positive for metastatic non-small cell carcinoma favoring a metastatic lung adenocarcinoma  He is scheduled for hospital follow-up with Dr Opal Rahman on 4/5/23  Will outreach upon discharge

## 2023-03-27 NOTE — PROGRESS NOTES
Progress Notes - Family Medicine Residency, Aftab Vazquez 1955, 69 y o  male  MRN: [id number]    [facility] Encounter: [id number]  Primary Care Provider: Gina Holland DO      Admission Date: 3/20/2023 0013  Length of Stay: 7 days  Code Status:  Level 1 - Full Code  Consult:   IP CONSULT TO NEUROSURGERY  IP CONSULT TO NEUROLOGY  IP CONSULT TO PULMONOLOGY  IP CONSULT TO ONCOLOGY  IP CONSULT TO UROLOGY  IP CONSULT TO CASE MANAGEMENT    * Brain mass  Assessment & Plan  Assessment & Plan  POD#4 Right frontal CRANIOTOMY IMAGE-GUIDED FOR TUMOR (Dr Sara Cuello, 3/23/2023)  - R frontal brain mass w/ surrounding vasogenic edema   - presented to the [facility] ED on 3/19 with progressively worsening LUE and LLE weakness  - 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, molecular study pending   - pt reports remote hx of prostate CA s/p prostatectomy several yrs ago and no further intervention or f/u since  - Neuro exam w/o focal deficit, LLE strenght 4 5/5, subjectively felt stronger than day before     Imaging:  - MRI brain 03/19: Rim-enhancing 2 4 cm centrally necrotic metastatic lesion in the right frontal lobe with surrounding severe vasogenic edema  Mass effect in the right hemisphere with approximately 2 4 mm right to left midline shift  - MRI brain 3/23: Expected postsurgical change from right parietal craniotomy and resection of cystic mass in the frontoparietal region  Stable vasogenic edema surrounding the resection cavity   Right to left midline shift of 2 mm    Plan:   · Continue to closely monitor neuro exam at this time   · frequent neuro checks q6hr   · Repeat STAT CTH with any acute decline in GCS > 2pts or more   · Maintain normotensive BP goals, SBP < 160   · Continue keppra 500mg q 12hrs x 1 week for seizure ppx  · Continue decadron wean as ordered q 48hours  · Hold all therapeutic doses of AC / AP therapy, continue to hold asa for at least 2 week  · DVT ppx: SCDs, "on Lovenox   · PT/OT when able  · Continue medical management and pain control   · Heme/onc and rad/onc consults  · CM following for rehab need       Melton catheter in place  Assessment & Plan  Patient with traumatic melton insert perioperatively  Urology aware   Urine clear in color   Will leave melton in place for now; plan to leave melton catheter for 7 days and follow up with urology outpatient for void trial and removal        Lung nodule  Assessment & Plan  Has a 20 pk-year smoking history, quit 30 years ago  Pt has no previous hx of lung cancer screening and has no pulm symptoms  CTA head and neck initially done on 3/19/2023 incidentally showed RUL mass with partially imaged right hilar adenopathy  CT chest/abd/pelvis with contrast 3/20/2023 shows \"3 8 x 3 5 cm right upper lobe lung mass with associated overlying pleural tag and adjacent to pleural thickening, this may be due to pleural reaction or mild pleural invasion  No contralateral lung nodules or mass  Right hilar lymphadenopathy  Small lower right paratracheal left paratracheal lymph node do not meet the criteria for pathologic enlargement on the bases of size or morphology  There is no CT evidence of for metastatic disease in the abdomen and pelvis  No lytic destructive lesion in the visualized bony skeleton  \" which is concerning for a primary lung tumor given recently diagnosed brain mass concerning for metastasis on MRI brain  Plan:  -Consulted pulmonology  -Consulted hem/onc for management of metastatic malignancy: Recommend full body bone scan which was completed on 3/22 and showed no metastatic disease  Patient is recommended to follow-up with heme-onc 2 weeks after discharge, possible PET-CT; they will continue following and waiting for biopsy results          Cerebral edema (HCC)  Assessment & Plan  - Per MRI 3/22; no increase of vasogenic edema   - Continue with Keppra and Steroid with taper per neurosurgery recommendations     CVA " "(cerebral vascular accident) [facility])  Assessment & Plan  Hx CVA 11-12 years ago with mild residual L sided deficits, baseline fully functional motor  - will hold aspirin 81 mg for 2 weeks per neurosurgery recommendations     Essential hypertension  Assessment & Plan  Systolic (46QTK), MARRERO:721 , Min:149 , Max:164     Initial 177/98 on transfer  Continue PTA medications amlodipine 10 mg and losartan 50 mg daily, antihypertensive med given on day of surgery     PRN Hydralazine             VTE Pharmacologic Prophylaxis: Enoxaparin (Lovenox)  VTE Mechanical Prophylaxis: sequential compression device  Diet: Regular house diet  Code Status: Full code  Disposition: POD4, placement pending     Discussed with attending physician, Dr Marrero, and [facility] team   Subjective:   Seen and examined at bed side  AOX4, left sided strength almost 5/5 today  No acute concern  Objective:     Vitals: Blood pressure 160/85, pulse 70, temperature 98 5 °F (36 9 °C), resp  rate 20, height 5' 9\" (1 753 m), weight 83 4 kg (183 lb 13 8 oz), SpO2 95 %  ,Body mass index is 27 15 kg/m²  Intake/Output Summary (Last 24 hours) at 3/27/2023 0840  Last data filed at 3/27/2023 0800  Gross per 24 hour   Intake --   Output 1825 ml   Net -1825 ml       Physical Exam  HENT:      Head: Normocephalic  Comments: Scalp surgical wound has new dressing on, no soak through     Right Ear: External ear normal       Left Ear: External ear normal       Mouth/Throat:      Pharynx: No oropharyngeal exudate  Eyes:      General: No scleral icterus  Cardiovascular:      Rate and Rhythm: Normal rate and regular rhythm  Heart sounds: Normal heart sounds  No murmur heard  Pulmonary:      Effort: No respiratory distress  Breath sounds: Normal breath sounds  No wheezing  Abdominal:      Palpations: Abdomen is soft  Tenderness: There is no abdominal tenderness  Musculoskeletal:         General: No swelling or deformity   Normal range of " motion  Skin:     Capillary Refill: Capillary refill takes less than 2 seconds  Coloration: Skin is not jaundiced or pale  Findings: No erythema  Neurological:      General: No focal deficit present  Mental Status: He is alert and oriented to person, place, and time  Mental status is at baseline  Motor: Weakness present  Comments: Mild deficit in strength in left side when compared to right side  However this is known; from previous stroke  Psychiatric:         Mood and Affect: Mood normal          Invasive Devices     Peripheral Intravenous Line  Duration           Peripheral IV 03/26/23 Distal;Right;Ventral (anterior) Forearm <1 day          Drain  Duration           Urethral Catheter Latex; Other (Comment) 16 Fr  3 days                           Lab and other studies:  I have personally reviewed pertinent reports  No results displayed because visit has over 200 results            Recent Results (from the past 24 hour(s))   Fingerstick Glucose (POCT)    Collection Time: 03/26/23 11:30 AM   Result Value Ref Range    POC Glucose 116 65 - 140 mg/dl   Fingerstick Glucose (POCT)    Collection Time: 03/26/23  3:48 PM   Result Value Ref Range    POC Glucose 145 (H) 65 - 140 mg/dl   Fingerstick Glucose (POCT)    Collection Time: 03/26/23  9:23 PM   Result Value Ref Range    POC Glucose 157 (H) 65 - 140 mg/dl     Blood Culture: No results found for: BLOODCX,   Urinalysis:   Lab Results   Component Value Date    COLORU YELLOW 05/10/2018    CLARITYU CLEAR 05/10/2018    SPECGRAV 1 015 05/10/2018    PHUR 6 5 05/10/2018    LEUKOCYTESUR NEGATIVE 05/10/2018    NITRITE NEGATIVE 05/10/2018    PROTEINUA NEGATIVE 05/10/2018    GLUCOSEU NEGATIVE 05/10/2018    KETONESU NEGATIVE 05/10/2018    BILIRUBINUR NEGATIVE 05/10/2018    BLOODU NEGATIVE 05/10/2018   ,   Urine Culture: No results found for: URINECX,   Wound Culure: No results found for: WOUNDCULT      Imaging:  None  MRI brain w wo contrast    Result Date: 3/24/2023  1  Expected postsurgical change from right parietal craniotomy and resection of cystic mass in the frontoparietal region  2   Stable vasogenic edema surrounding the resection cavity  Right to left midline shift of 2 mm  Workstation performed: [id number]     NM bone scan whole body    Result Date: 3/22/2023  1  No focal tracer activity characteristic of osseous metastatic disease  Workstation performed: [id number]     CT chest abdomen pelvis w contrast    Result Date: 3/21/2023  3 8 x 3 5 cm right upper lobe lung mass with associated overlying pleural tag and adjacent to pleural thickening, this may be due to pleural reaction or mild pleural invasion No contralateral lung nodules or mass Right hilar lymphadenopathy  Small lower right paratracheal left paratracheal lymph node do not meet the criteria for pathologic enlargement on the bases of size or morphology There is no CT evidence of for metastatic disease in the abdomen and pelvis No lytic destructive lesion in the visualized bony skeleton The study was marked in EPIC for significant notification  Workstation performed: [id number]     MRI Brain BT w wo Contrast    Result Date: 3/22/2023  Unchanged 2 5 cm right parasagittal frontal lobe vertex mass with central necrosis and marked perilesional vasogenic edema unchanged  Favor metastatic disease (given lung mass) over primary high-grade glioma  Unchanged small subacute infarct in right frontal lobe  Unchanged 0 2 cm leftward midline shift  No new acute intracranial abnormality   Workstation performed: [id number]        Current Facility-Administered Medications   Medication Dose Route Frequency   • acetaminophen (TYLENOL) tablet 975 mg  975 mg Oral Q8H PRN   • amLODIPine (NORVASC) tablet 10 mg  10 mg Oral Daily   • atorvastatin (LIPITOR) tablet 40 mg  40 mg Oral After Dinner   • bisacodyl (DULCOLAX) rectal suppository 10 mg  10 mg Rectal Daily PRN   • dexamethasone (DECADRON) tablet 4 mg  4 mg Oral Q8H [address]    Followed by   • dexamethasone (DECADRON) tablet 2 mg  2 mg Oral Q6H [address]    Followed by   • [START ON 3/30/2023] dexamethasone (DECADRON) tablet 2 mg  2 mg Oral Q8H [address]    Followed by   • [START ON 3/31/2023] dexamethasone (DECADRON) tablet 2 mg  2 mg Oral Q12H [address]    Followed by   • [START ON 4/3/2023] dexamethasone (DECADRON) tablet 2 mg  2 mg Oral Q24H LEN   • docusate sodium (COLACE) capsule 100 mg  100 mg Oral BID   • enoxaparin (LOVENOX) subcutaneous injection 40 mg  40 mg Subcutaneous Daily   • hydrALAZINE (APRESOLINE) injection 10 mg  10 mg Intravenous Q4H PRN   • HYDROmorphone HCl (DILAUDID) injection 0 2 mg  0 2 mg Intravenous Q4H PRN   • levETIRAcetam (KEPPRA) tablet 500 mg  500 mg Oral Q12H [address]   • losartan (COZAAR) tablet 50 mg  50 mg Oral Daily   • melatonin tablet 3 mg  3 mg Oral HS   • ondansetron (ZOFRAN) injection 4 mg  4 mg Intravenous Q6H PRN   • oxyCODONE (ROXICODONE) IR tablet 2 5 mg  2 5 mg Oral Q4H PRN   • oxyCODONE (ROXICODONE) IR tablet 5 mg  5 mg Oral Q4H PRN   • pantoprazole (PROTONIX) EC tablet 40 mg  40 mg Oral Early Morning   • senna (SENOKOT) tablet 8 6 mg  1 tablet Oral Daily             Haydee Johnson DO   Family Medicine

## 2023-03-27 NOTE — ASSESSMENT & PLAN NOTE
POD#4 Right frontal CRANIOTOMY IMAGE-GUIDED FOR TUMOR resection (Dr Tigist Real, 3/23/2023)  · R frontal brain mass w/ surrounding vasogenic edema   · presented to the [place] ED on 3/19 with progressively worsening LUE and LLE weakness  · concern for metastatic disease given noted RUL mass on CT c/a/p  · pt reports remote hx of prostate CA s/p prostatectomy several yrs ago and no further intervention or f/u since    Imaging:   · MRI brain w wo contrast 3/23/2023: Expected postsurgical change from right parietal craniotomy and resection of cystic mass in the frontoparietal region  Stable vasogenic edema surrounding the resection cavity  Right to left midline shift of 2 mm  Plan:   · Continue to closely monitor neuro exam at this time   · frequent neuro checks per primary team   · Repeat STAT CTH with any acute decline in GCS > 2pts or more   · Maintain normotensive BP goals, SBP < 160   · No further neurosurgical intervention indicated at this time  · Continue keppra 500mg q 12hrs x 1 week for seizure ppx  · Continue decadron wean as ordered   · Hold all therapeutic doses of AC / AP therapy  · continue to hold asa for at least 2 weeks  · DVT ppx: SCDs, lovenox SQ  · Pain control per primary team   · PT/OT  · Continue medical management per primary team   · Will need heme/onc and rad/onc consults/input given concern for lung primary disease with RUL mass noted   · Mcclelland care/managment per urology and primary team   · Social work following for assistance with dispo once medically stable/cleared     Neurosurgery will sign off at this time  Pt will be seen in the nsgy clinic in 2 weeks and in 6 weeks for follow-up after surgery and review of repeat imaging and pathology  Please reach out with any further questions or concerns

## 2023-03-27 NOTE — PROGRESS NOTES
[facility]  Progress Note  Name: Megan Diaz  MRN: [id number]  Unit/Bed#: PPHP 729-01 I Date of Admission: 3/20/2023   Date of Service: 3/27/2023 I Hospital Day: 7    Assessment/Plan   * Brain mass  Assessment & Plan  POD#4 Right frontal CRANIOTOMY IMAGE-GUIDED FOR TUMOR resection (Dr Carlos Aguiar, 3/23/2023)  · R frontal brain mass w/ surrounding vasogenic edema   · presented to the [place] ED on 3/19 with progressively worsening LUE and LLE weakness  · concern for metastatic disease given noted RUL mass on CT c/a/p  · pt reports remote hx of prostate CA s/p prostatectomy several yrs ago and no further intervention or f/u since    Imaging:   · MRI brain w wo contrast 3/23/2023: Expected postsurgical change from right parietal craniotomy and resection of cystic mass in the frontoparietal region  Stable vasogenic edema surrounding the resection cavity  Right to left midline shift of 2 mm  Plan:   · Continue to closely monitor neuro exam at this time   · frequent neuro checks per primary team   · Repeat STAT CTH with any acute decline in GCS > 2pts or more   · Maintain normotensive BP goals, SBP < 160   · No further neurosurgical intervention indicated at this time  · Continue keppra 500mg q 12hrs x 1 week for seizure ppx  · Continue decadron wean as ordered   · Hold all therapeutic doses of AC / AP therapy  · continue to hold asa for at least 2 weeks  · DVT ppx: SCDs, lovenox SQ  · Pain control per primary team   · PT/OT  · Continue medical management per primary team   · Will need heme/onc and rad/onc consults/input given concern for lung primary disease with RUL mass noted   · Mcclelland care/managment per urology and primary team   · Social work following for assistance with dispo once medically stable/cleared     Neurosurgery will sign off at this time   Pt will be seen in the nsgy clinic in 2 weeks and in 6 weeks for follow-up after surgery and review of repeat imaging and "pathology  Please reach out with any further questions or concerns  Brain compression [facility])  Assessment & Plan  - see plan as further documented above     Cerebral edema [facility])  Assessment & Plan  - see plan as further documented above          Subjective/Objective   Chief Complaint: \"good morning\"     Subjective: Pt seen and examined this am on rounds  BRITTNEYEO  Pt reports baseline LUE pre-op weakness persists  He had some LLE increased weakness post-op that appears to be slightly improved  Pt states that he was able to ambulate to the bathroom today with a walker  He feels his walking has been improving with each passing day  Offers no other complaints at this time  Pt is ready for discharge at this time  Social work assisting with [address] to [employer]  Objective: well appearing, elderly male sitting up comfortably in bed  In no acute distress     I/O       03/25 0701 03/26 0700 03/26 0701 03/27 0700 03/27 0701 03/28 0700    P  O  Total Intake(mL/kg)       Urine (mL/kg/hr) 2600 (1 3) 1750 (0 9) 1825 (4 8)    Total Output 2600 1750 1825    Net -2600 -1750 -1825               Invasive Devices     Peripheral Intravenous Line  Duration           Peripheral IV 03/26/23 Distal;Right;Ventral (anterior) Forearm 1 day          Drain  Duration           Urethral Catheter Latex; Other (Comment) 16 Fr  3 days              Physical Exam:  Vitals: Blood pressure 160/85, pulse 70, temperature 98 5 °F (36 9 °C), resp  rate 20, height 5' 9\" (1 753 m), weight 83 4 kg (183 lb 13 8 oz), SpO2 95 %  ,Body mass index is 27 15 kg/m²  General appearance: alert, appears stated age, cooperative and no distress  Head: Normocephalic, R frotnal crani site noted, incision intact with staples, no active drainage   Some dried blood and scabbing noted around incision   Eyes: EOMI, PERRL  Neck: supple, symmetrical, trachea midline and NT  Back: no kyphosis present, no tenderness to percussion or palpation  Lungs: non labored breathing, no resp " distress on room air   Heart: regular heart rate  Neurologic:   Mental status: Alert, oriented x3, thought content appropriate  Cranial nerves: grossly intact (Cranial nerves II-XII)  Sensory: decreased sensation noted to L palm and L plantar aspect of foot, baseline since CVA about 12 years ago  Sensation otherwise equal and intact to light touch franco   Motor:   - L sided weakness noted  - LUE rated 4/5 throughout   - LLE rated 4/5 at hip and knee flex/ext, rated 3-4-/5 at ankle plantar and dorsiflexion   Reflexes: 2+ and symmetric  Coordination: finger to nose normal bilaterally, no drift bilaterally    Lab Results:  Results from last 7 days   Lab Units 03/26/23  0442 03/25/23  0602 03/24/23  0436 03/23/23  0656 03/22/23  0443   WBC Thousand/uL 13 07* 14 18* 14 43* 13 29* 12 13*   HEMOGLOBIN g/dL 13 4 13 5 13 3 14 1 12 9   HEMATOCRIT % 39 4 40 5 39 5 42 7 38 8   PLATELETS Thousands/uL 262 264 263 319 310   NEUTROS PCT %  --   --   --   --  89*   MONOS PCT %  --   --   --   --  4   MONO PCT % 9 10  --  5  --      Results from last 7 days   Lab Units 03/26/23  0442 03/25/23  0602 03/23/23  2341 03/23/23  0656   POTASSIUM mmol/L 4 0 4 0 3 7 4 0   CHLORIDE mmol/L 107 108 105 110*   CO2 mmol/L 25 25 28 26   BUN mg/dL 18 19 19 18   CREATININE mg/dL 0 67 0 78 0 84 0 76   CALCIUM mg/dL 7 7* 8 1* 8 0* 8 5   ALK PHOS U/L  --   --   --  82   ALT U/L  --   --   --  15   AST U/L  --   --   --  10             Results from last 7 days   Lab Units 03/24/23  0436 03/23/23  0656   INR  1 06 1 00   PTT seconds 24 24     No results found for: TROPONINT  ABG:No results found for: PHART, YKF0IJK, PO2ART, CZY2BCQ, V9NHAUEW, BEART, SOURCE    Imaging Studies: I have personally reviewed pertinent reports  and I have personally reviewed pertinent films in PACS    MRI brain w wo contrast    Result Date: 3/24/2023  Impression: 1    Expected postsurgical change from right parietal craniotomy and resection of cystic mass in the frontoparietal region  2   Stable vasogenic edema surrounding the resection cavity  Right to left midline shift of 2 mm  Workstation performed: [id number]     NM bone scan whole body    Result Date: 3/22/2023  Impression: 1  No focal tracer activity characteristic of osseous metastatic disease  Workstation performed: [id number]     CT chest abdomen pelvis w contrast    Result Date: 3/21/2023  Impression: 3 8 x 3 5 cm right upper lobe lung mass with associated overlying pleural tag and adjacent to pleural thickening, this may be due to pleural reaction or mild pleural invasion No contralateral lung nodules or mass Right hilar lymphadenopathy  Small lower right paratracheal left paratracheal lymph node do not meet the criteria for pathologic enlargement on the bases of size or morphology There is no CT evidence of for metastatic disease in the abdomen and pelvis No lytic destructive lesion in the visualized bony skeleton The study was marked in EPIC for significant notification  Workstation performed: [id number]     MRI Brain BT w wo Contrast    Result Date: 3/22/2023  Impression: Unchanged 2 5 cm right parasagittal frontal lobe vertex mass with central necrosis and marked perilesional vasogenic edema unchanged  Favor metastatic disease (given lung mass) over primary high-grade glioma  Unchanged small subacute infarct in right frontal lobe  Unchanged 0 2 cm leftward midline shift  No new acute intracranial abnormality  Workstation performed: [id number]       EKG, Pathology, and Other Studies: I have personally reviewed pertinent reports        VTE Pharmacologic Prophylaxis: Sequential compression device (Venodyne)  and Enoxaparin (Lovenox)    VTE Mechanical Prophylaxis: sequential compression device

## 2023-03-27 NOTE — ARC ADMISSION
Notified by CM that referrals are being sent elsewhere due to [facility] being out of network with patient's insurance plan  Please notify with any changes

## 2023-03-27 NOTE — TELEPHONE ENCOUNTER
"Soft Intake Form   Patient Details   Oliverio Husbands     1955     Reason For Appointment   Who is Calling? Linda Necessary   If not patient, Name? NA    DID YOU CONFIRM INSURANCE WITH PATIENT? E verified, Routed to finance   Who is the Referring Doctor? Dr Guillermina Richards   What is the diagnosis? Lung mass and brain mass   Has this diagnosis been confirmed by a biopsy/surgery? If yes, what is the date it was done? Brain bx taken 3/23 (favors lung adenocarcinoma)   Biopsy done at [facility]? If not, where was it done? Yes   Was imaging done, and was it done at [facility]? If not, where was it done? Yes-H   Have you been seen by another Oncologist?  If so, who and where (name of facility, city and state) No   For 2nd Opinions Only: Are you currently undergoing treatment, or are you scheduled to start treatment? If yes, name of facility, city and state  NA   For \"History Of\" only: Have you completed treatment? NA   Have you had Genetic Testing done in the past?  If so, advise to bring test results to their visit Unknown   Record Gathering Information   Did you advise to have records faxed to [phone]? NA   Did you advise to have disks sent to the proper address with imaging? (\"History of\" Patients)  5 years of imaging for breast patients-Mammos, US etc NA   Scheduling Information   Did you send new patient paperwork? Email or mail? NA   What is the best call back number? (If the [facility] is calling, please use their number) NA   Miscellaneous Information      The patient is scheduled for a HFU appt with Dr Georgia Will in the [place] office on 4/5 at 1120       "

## 2023-03-27 NOTE — TELEPHONE ENCOUNTER
03/29/2023-PT STILL IN HOSPITAL    03/28/2023-PT STILL IN HOSPITAL    03/27/2023-PT STILL IN HOSPITAL  PER LAI WALLS W/DR GAYLE AND PER DR SMITH [facility] ALREADY REVIEWED W/PT    04/06/2023-2 WK POV W/NURSE  05/08/2023-6 WK POV Nina Ward PA-C   Can we please schedule this patient for a 2 week post-op visit and a 6 week post-op visit with Dr Melanie Hui?  Pt is s/p R frontal crani for tumor resection on 3/23/2023        2 week POV incision check with pathology review w/ Dr Melanie Hui   6 week POV with Dr Melanie Hui

## 2023-03-27 NOTE — UTILIZATION REVIEW
Continued Stay Review    Date: 1- 27-23                          Current Patient Class:  Inpatient  Current Level of Care: med surg    HPI:67 y o  male initially admitted on 3-20-23    Assessment/Plan:     Pod 4 right frontal craniotomy for tumor resection  Concern for metastatic disease with know RUL mass on CT of chest   No further neuro surgical intervention at this time  Continue decadron wean and keppra q12 hr   Continue PT/OT  / melton catheter managed by urology  Patient with left sided weakness  Therapy recommends inpatient rehab  Referrals completed        Vital Signs:     Date/Time Temp Pulse Resp BP MAP (mmHg) SpO2   03/27/23 08:00:45 98 5 °F (36 9 °C) 70 -- 160/85 110 95 %   03/27/23 06:09:04 -- 63 -- 154/87 109 94 %           Pertinent Labs/Diagnostic Results:       Results from last 7 days   Lab Units 03/26/23  0442 03/25/23  0602 03/24/23  0436 03/23/23  0656 03/22/23  0443   WBC Thousand/uL 13 07* 14 18* 14 43* 13 29* 12 13*   HEMOGLOBIN g/dL 13 4 13 5 13 3 14 1 12 9   HEMATOCRIT % 39 4 40 5 39 5 42 7 38 8   PLATELETS Thousands/uL 262 264 263 319 310   NEUTROS ABS Thousands/µL  --   --   --   --  10 74*   BANDS PCT %  --   --   --  1  --          Results from last 7 days   Lab Units 03/26/23  0442 03/25/23  0602 03/23/23  2341 03/23/23  0656 03/21/23  0628   SODIUM mmol/L 135 137 137 139 139   POTASSIUM mmol/L 4 0 4 0 3 7 4 0 3 8   CHLORIDE mmol/L 107 108 105 110* 111*   CO2 mmol/L 25 25 28 26 25   ANION GAP mmol/L 3* 4 4 3* 3*   BUN mg/dL 18 19 19 18 14   CREATININE mg/dL 0 67 0 78 0 84 0 76 0 74   EGFR ml/min/1 73sq m 98 92 89 93 94   CALCIUM mg/dL 7 7* 8 1* 8 0* 8 5 8 9     Results from last 7 days   Lab Units 03/23/23  0656   AST U/L 10   ALT U/L 15   ALK PHOS U/L 82   TOTAL PROTEIN g/dL 7 4   ALBUMIN g/dL 3 7   TOTAL BILIRUBIN mg/dL 0 30     Results from last 7 days   Lab Units 03/27/23  1152 03/26/23  2123 03/26/23  1548 03/26/23  1130 03/26/23  5236 03/25/23  2055 03/25/23  1602 03/25/23  1051 03/25/23  0612 03/25/23  0018 03/24/23  0617 03/24/23  0002   POC GLUCOSE mg/dl 230* 157* 145* 116 128 147* 171* 150* 155* 161* 129 128     Results from last 7 days   Lab Units 03/26/23  0442 03/25/23  0602 03/23/23  2341 03/23/23  0656 03/21/23  0628   GLUCOSE RANDOM mg/dL 149* 152* 139 139 152*         Results from last 7 days   Lab Units 03/22/23  1327   HEMOGLOBIN A1C % 5 9*   EAG mg/dl 123       Results from last 7 days   Lab Units 03/24/23  0436 03/23/23  0656   PROTIME seconds 14 0 13 4   INR  1 06 1 00   PTT seconds 24 24         Medications:   Scheduled Medications:  amLODIPine, 10 mg, Oral, Daily  atorvastatin, 40 mg, Oral, After Dinner  dexamethasone, 2 mg, Oral, Q6H [address]   Followed by  Dawn Saenz ON 3/30/2023] dexamethasone, 2 mg, Oral, Q8H [address]   Followed by  Dawn Saenz ON 3/31/2023] dexamethasone, 2 mg, Oral, Q12H [address]   Followed by  Dawn Saenz ON 4/3/2023] dexamethasone, 2 mg, Oral, Q24H [address]  docusate sodium, 100 mg, Oral, BID  enoxaparin, 40 mg, Subcutaneous, Daily  levETIRAcetam, 500 mg, Oral, Q12H LEN  losartan, 50 mg, Oral, Daily  melatonin, 3 mg, Oral, HS  pantoprazole, 40 mg, Oral, Early Morning  senna, 1 tablet, Oral, Daily      Continuous IV Infusions:     PRN Meds:  acetaminophen, 975 mg, Oral, Q8H PRN  bisacodyl, 10 mg, Rectal, Daily PRN  hydrALAZINE, 10 mg, Intravenous, Q4H PRN  HYDROmorphone, 0 2 mg, Intravenous, Q4H PRN  ondansetron, 4 mg, Intravenous, Q6H PRN  oxyCODONE, 2 5 mg, Oral, Q4H PRN  oxyCODONE, 5 mg, Oral, Q4H PRN        Discharge Plan: to be determined     Network Utilization Review Department  ATTENTION: Please call with any questions or concerns to [phone] and carefully listen to the prompts so that you are directed to the right person  All voicemails are confidential   Macie Glaser all requests for admission clinical reviews, approved or denied determinations and any other requests to dedicated fax number below belonging to the campus where the patient is receiving treatment  List of dedicated fax numbers for the Facilities:  [address] [place] [address] DENIALS (Administrative/Medical Necessity) [phone]   [address]  (Maternity/NICU/Pediatrics) [phone]   [address] Geraldine [address] [phone]   Eusebia Mcleod [address] [phone]   [address] [address] [facility]ana [facility] [phone]   15[facility] Yoel Reich [facility] 134 815 [facility] [phone]

## 2023-03-28 ENCOUNTER — TELEPHONE (OUTPATIENT)
Dept: NEUROSURGERY | Facility: CLINIC | Age: 68
End: 2023-03-28

## 2023-03-28 ENCOUNTER — TELEPHONE (OUTPATIENT)
Dept: OTHER | Facility: HOSPITAL | Age: 68
End: 2023-03-28

## 2023-03-28 DIAGNOSIS — G93.89 BRAIN MASS: Primary | ICD-10-CM

## 2023-03-28 LAB
GLUCOSE SERPL-MCNC: 102 MG/DL (ref 65–140)
GLUCOSE SERPL-MCNC: 128 MG/DL (ref 65–140)
GLUCOSE SERPL-MCNC: 155 MG/DL (ref 65–140)

## 2023-03-28 RX ADMIN — ATORVASTATIN CALCIUM 40 MG: 40 TABLET, FILM COATED ORAL at 17:40

## 2023-03-28 RX ADMIN — LEVETIRACETAM 500 MG: 500 TABLET, FILM COATED ORAL at 21:28

## 2023-03-28 RX ADMIN — DEXAMETHASONE 2 MG: 2 TABLET ORAL at 05:04

## 2023-03-28 RX ADMIN — DEXAMETHASONE 2 MG: 2 TABLET ORAL at 11:41

## 2023-03-28 RX ADMIN — SENNOSIDES 8.6 MG: 8.6 TABLET, FILM COATED ORAL at 08:42

## 2023-03-28 RX ADMIN — ACETAMINOPHEN 975 MG: 325 TABLET ORAL at 21:29

## 2023-03-28 RX ADMIN — DOCUSATE SODIUM 100 MG: 100 CAPSULE, LIQUID FILLED ORAL at 17:40

## 2023-03-28 RX ADMIN — DOCUSATE SODIUM 100 MG: 100 CAPSULE, LIQUID FILLED ORAL at 08:42

## 2023-03-28 RX ADMIN — DEXAMETHASONE 2 MG: 2 TABLET ORAL at 17:40

## 2023-03-28 RX ADMIN — AMLODIPINE BESYLATE 10 MG: 10 TABLET ORAL at 08:43

## 2023-03-28 RX ADMIN — LEVETIRACETAM 500 MG: 500 TABLET, FILM COATED ORAL at 08:42

## 2023-03-28 RX ADMIN — LOSARTAN POTASSIUM 50 MG: 50 TABLET, FILM COATED ORAL at 08:42

## 2023-03-28 RX ADMIN — MELATONIN 3 MG: at 21:28

## 2023-03-28 RX ADMIN — ENOXAPARIN SODIUM 40 MG: 40 INJECTION SUBCUTANEOUS at 08:41

## 2023-03-28 RX ADMIN — PANTOPRAZOLE SODIUM 40 MG: 40 TABLET, DELAYED RELEASE ORAL at 05:04

## 2023-03-28 NOTE — RESTORATIVE TECHNICIAN NOTE
Restorative Technician Note      Patient Name: Zana Conteh     Note Type: Mobility  Patient Position Upon Consult: Bedside chair  Activity Performed: Ambulated; Dangled; Stood  Assistive Device: Roller walker  Education Provided: Yes  Patient Position at [facility] Consult: Bedside chair; Bed/Chair alarm activated;  All needs within reach    [facility] VINAYAK CORNEJO, Restorative Technician, [employer]

## 2023-03-28 NOTE — CASE MANAGEMENT
Case Management Discharge Planning Note    Patient name Sukhjinder Schneider  [facility] [id number]  : 1955 Date 3/28/2023       Current Admission Date: 3/20/2023  Current Admission Diagnosis:Brain mass   Patient Active Problem List    Diagnosis Date Noted   • Mcclelland catheter in place 2023   • Lung nodule 2023   • Brain mass 2023   • Brain compression ([address] ) 2023   • Cerebral edema ([address] ) 2023   • Hypercholesterolemia 2021   • CVA (cerebral vascular accident) ([address] ) 2021   • Essential hypertension 2020   • Annual physical exam 2020   • Negative depression screening 2020   • Overweight (BMI 25 0-29 9) 2020      LOS (days): 8  Geometric Mean LOS (GMLOS) (days): 6 60  Days to GMLOS:-0 3     OBJECTIVE:  Risk of Unplanned Readmission Score: 12 38         Current admission status: Inpatient   Preferred Pharmacy:    [facility] 65  [facility] 65  [place] [address] [place] [address]  Phone: [phone] Fax: [phone]    Primary Care Provider: Rodolfo Allison DO    Primary Insurance: BLUE CROSS  Secondary Insurance: MEDICARE    DISCHARGE DETAILS:    Other Referral/Resources/Interventions Provided:  Referral Comments: Per conversation with ARC CM re-referred to [facility] and requested updated PT/OT notes

## 2023-03-28 NOTE — TELEPHONE ENCOUNTER
Received request from Dr Elia Leach to set patient up with radiation oncology & hematology oncology  Orders placed  Called [facility] to set up appointment, rep states patient already has an appointment with Dr Georgia Will on 4/5/2023  Will follow up to ensure patient sees appropriate oncologist to address brain mets  MRI scheduled for 4/6 1500 at the [address] [place] [address]  This RN reached out to Belia Strickland onc nurse navigator to schedule patient

## 2023-03-28 NOTE — CASE MANAGEMENT
Case Management Discharge Planning Note    Patient name Daneil Severin  Location 99 [facility] 729/[facility] 763-68 MRN [id number]  : 1955 Date 3/28/2023       Current Admission Date: 3/20/2023  Current Admission Diagnosis:Brain mass   Patient Active Problem List    Diagnosis Date Noted   • Mcclelland catheter in place 2023   • Lung nodule 2023   • Brain mass 2023   • Brain compression ([address] ) 2023   • Cerebral edema ([address] ) 2023   • Hypercholesterolemia 2021   • CVA (cerebral vascular accident) ([address] ) 2021   • Essential hypertension 2020   • Annual physical exam 2020   • Negative depression screening 2020   • Overweight (BMI 25 0-29 9) 2020      LOS (days): 8  Geometric Mean LOS (GMLOS) (days): 6 60  Days to GMLOS:-0 5     OBJECTIVE:  Risk of Unplanned Readmission Score: 12 38         Current admission status: Inpatient   Preferred Pharmacy:    [facility] 65  [facility] 65  [place] [address] [place] [address]  Phone: [phone] Fax: [phone]    Primary Care Provider: Martin Keene DO    Primary Insurance: BLUE CROSS  Secondary Insurance: MEDICARE    DISCHARGE DETAILS:       Family notified[de-identified] CM and J O from [facility] met with pt and spouse bedside  CM J O explained conversation she had with the insurance co  CM has requested updated PT/OT notes, J  O  will f/u with CM tomorrow after revieweing notes  Joslyn Sinclair is interested iin pt coming to their site   CM will request insurance auth for transportation when Isiah Lee is submitted

## 2023-03-28 NOTE — PROGRESS NOTES
Progress Notes - Family Medicine Residency, Bebe Rosas 1955, 69 y o  male  MRN: [id number]    Unit/Bed#: [facility] 729-01 Encounter: [id number]  Primary Care Provider: Mehnaz Yung DO      Admission Date: 3/20/2023 0013  Length of Stay: 8 days  Code Status:  Level 1 - Full Code  Consult:   IP CONSULT TO NEUROSURGERY  IP CONSULT TO NEUROLOGY  IP CONSULT TO PULMONOLOGY  IP CONSULT TO ONCOLOGY  IP CONSULT TO UROLOGY  IP CONSULT TO CASE MANAGEMENT    * Brain mass  Assessment & Plan  Assessment & Plan  POD#4 Right frontal CRANIOTOMY IMAGE-GUIDED FOR TUMOR (Dr Melanie Hui, 3/23/2023)  - R frontal brain mass w/ surrounding vasogenic edema   - presented to the [place] ED on 3/19 with progressively worsening LUE and LLE weakness  - 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, molecular study pending   - pt reports remote hx of prostate CA s/p prostatectomy several yrs ago and no further intervention or f/u since  - Neuro exam w/o focal deficit, LLE strenght 4 5/5, subjectively felt stronger than day before     Imaging:  - MRI brain 03/19: Rim-enhancing 2 4 cm centrally necrotic metastatic lesion in the right frontal lobe with surrounding severe vasogenic edema  Mass effect in the right hemisphere with approximately 2 4 mm right to left midline shift  - MRI brain 3/23: Expected postsurgical change from right parietal craniotomy and resection of cystic mass in the frontoparietal region  Stable vasogenic edema surrounding the resection cavity   Right to left midline shift of 2 mm    Plan:   · Continue to closely monitor neuro exam at this time   · frequent neuro checks q6hr   · Repeat STAT CTH with any acute decline in GCS > 2pts or more   · Maintain normotensive BP goals, SBP < 160   · Continue keppra 500mg q 12hrs x 1 week for seizure ppx  · Continue decadron wean as ordered q 48hours  · Hold all therapeutic doses of AC / AP therapy, continue to hold asa for at least 2 week  · DVT ppx: SCDs, "on Lovenox   · PT/OT when able  · Continue medical management and pain control   · Heme/onc and rad/onc consults  · CM following for rehab need       Melton catheter in place  Assessment & Plan  Patient with traumatic melton insert perioperatively  Urology aware   Urine clear in color   Will leave melton in place for now; plan to leave melton catheter for 7 days and follow up with urology outpatient for void trial and removal        Lung nodule  Assessment & Plan  Has a 20 pk-year smoking history, quit 30 years ago  Pt has no previous hx of lung cancer screening and has no pulm symptoms  CTA head and neck initially done on 3/19/2023 incidentally showed RUL mass with partially imaged right hilar adenopathy  CT chest/abd/pelvis with contrast 3/20/2023 shows \"3 8 x 3 5 cm right upper lobe lung mass with associated overlying pleural tag and adjacent to pleural thickening, this may be due to pleural reaction or mild pleural invasion  No contralateral lung nodules or mass  Right hilar lymphadenopathy  Small lower right paratracheal left paratracheal lymph node do not meet the criteria for pathologic enlargement on the bases of size or morphology  There is no CT evidence of for metastatic disease in the abdomen and pelvis  No lytic destructive lesion in the visualized bony skeleton  \" which is concerning for a primary lung tumor given recently diagnosed brain mass concerning for metastasis on MRI brain  Plan:   -Consulted pulmonology  -Consulted hem/onc for management of metastatic malignancy: Recommend full body bone scan which was completed on 3/22 and showed no metastatic disease  Patient is recommended to follow-up with heme-onc 2 weeks after discharge, possible PET-CT; they will continue following and waiting for biopsy results          Cerebral edema (HCC)  Assessment & Plan  - Per MRI 3/22; no increase of vasogenic edema   - Continue with Keppra and Steroid with taper per neurosurgery recommendations     CVA " "(cerebral vascular accident) [facility])  Assessment & Plan  Hx CVA 11-12 years ago with mild residual L sided deficits, baseline fully functional motor  - will hold aspirin 81 mg for 2 weeks per neurosurgery recommendations     Essential hypertension  Assessment & Plan  Systolic (77AWQ), NGF:270 , Min:133 , Max:169     Initial 177/98 on transfer  Continue PTA medications amlodipine 10 mg and losartan 50 mg daily, antihypertensive med given on day of surgery     PRN Hydralazine             VTE Pharmacologic Prophylaxis: Enoxaparin (Lovenox)  VTE Mechanical Prophylaxis: sequential compression device  Diet: Regular House   Code Status: Full code  Disposition: POD5, placement pending     Discussed with attending physician, Dr Marrero, and [facility] team     Subjective:   Patient is doing well  In good spirit  No focal deficit on neuro exam  AOX4, Continue to work with PT      Objective:     Vitals: Blood pressure 142/77, pulse 72, temperature 97 9 °F (36 6 °C), resp  rate 17, height 5' 9\" (1 753 m), weight 82 6 kg (182 lb 1 6 oz), SpO2 96 %  ,Body mass index is 26 89 kg/m²  Intake/Output Summary (Last 24 hours) at 3/28/2023 1104  Last data filed at 3/28/2023 0853  Gross per 24 hour   Intake 240 ml   Output 3050 ml   Net -2810 ml       Physical Exam  Constitutional:       Appearance: He is not toxic-appearing  HENT:      Head: Normocephalic and atraumatic  Right Ear: External ear normal       Left Ear: External ear normal       Nose: No congestion  Mouth/Throat:      Pharynx: No oropharyngeal exudate  Eyes:      General: No scleral icterus  Cardiovascular:      Rate and Rhythm: Normal rate and regular rhythm  Heart sounds: No murmur heard  No gallop  Pulmonary:      Effort: No respiratory distress  Breath sounds: No wheezing  Abdominal:      General: Abdomen is flat  There is no distension  Palpations: Abdomen is soft  Musculoskeletal:         General: No swelling or deformity   Normal range " of motion  Right lower leg: No edema  Left lower leg: No edema  Skin:     Capillary Refill: Capillary refill takes less than 2 seconds  Coloration: Skin is not jaundiced  Neurological:      General: No focal deficit present  Motor: Weakness present  Comments: Left lower extremity weakness compared to right  At baseline since 2 days ago, strength 4 5/5   Psychiatric:         Mood and Affect: Mood normal          Invasive Devices     Peripheral Intravenous Line  Duration           Peripheral IV 03/26/23 Distal;Right;Ventral (anterior) Forearm 1 day          Drain  Duration           Urethral Catheter Latex; Other (Comment) 16 Fr  4 days                           Lab and other studies:  I have personally reviewed pertinent reports  No results displayed because visit has over 200 results            Recent Results (from the past 24 hour(s))   Fingerstick Glucose (POCT)    Collection Time: 03/27/23 11:52 AM   Result Value Ref Range    POC Glucose 230 (H) 65 - 140 mg/dl   Fingerstick Glucose (POCT)    Collection Time: 03/27/23  6:58 PM   Result Value Ref Range    POC Glucose 164 (H) 65 - 140 mg/dl   Fingerstick Glucose (POCT)    Collection Time: 03/27/23  9:36 PM   Result Value Ref Range    POC Glucose 145 (H) 65 - 140 mg/dl   Fingerstick Glucose (POCT)    Collection Time: 03/28/23  6:11 AM   Result Value Ref Range    POC Glucose 102 65 - 140 mg/dl   Fingerstick Glucose (POCT)    Collection Time: 03/28/23 10:41 AM   Result Value Ref Range    POC Glucose 155 (H) 65 - 140 mg/dl     Blood Culture: No results found for: BLOODCX,   Urinalysis:   Lab Results   Component Value Date    COLORU YELLOW 05/10/2018    CLARITYU CLEAR 05/10/2018    SPECGRAV 1 015 05/10/2018    PHUR 6 5 05/10/2018    LEUKOCYTESUR NEGATIVE 05/10/2018    NITRITE NEGATIVE 05/10/2018    PROTEINUA NEGATIVE 05/10/2018    GLUCOSEU NEGATIVE 05/10/2018    KETONESU NEGATIVE 05/10/2018    BILIRUBINUR NEGATIVE 05/10/2018    BLOODU NEGATIVE 05/10/2018   ,   Urine Culture: No results found for: URINECX,   Wound Culure: No results found for: WOUNDCULT      Imaging:  None  MRI brain w wo contrast    Result Date: 3/24/2023  1  Expected postsurgical change from right parietal craniotomy and resection of cystic mass in the frontoparietal region  2   Stable vasogenic edema surrounding the resection cavity  Right to left midline shift of 2 mm  Workstation performed: [id number]     NM bone scan whole body    Result Date: 3/22/2023  1  No focal tracer activity characteristic of osseous metastatic disease  Workstation performed: [id number]     MRI Brain BT w wo Contrast    Result Date: 3/22/2023  Unchanged 2 5 cm right parasagittal frontal lobe vertex mass with central necrosis and marked perilesional vasogenic edema unchanged  Favor metastatic disease (given lung mass) over primary high-grade glioma  Unchanged small subacute infarct in right frontal lobe  Unchanged 0 2 cm leftward midline shift  No new acute intracranial abnormality   Workstation performed: [id number]        Current Facility-Administered Medications   Medication Dose Route Frequency   • acetaminophen (TYLENOL) tablet 975 mg  975 mg Oral Q8H PRN   • amLODIPine (NORVASC) tablet 10 mg  10 mg Oral Daily   • atorvastatin (LIPITOR) tablet 40 mg  40 mg Oral After Dinner   • bisacodyl (DULCOLAX) rectal suppository 10 mg  10 mg Rectal Daily PRN   • dexamethasone (DECADRON) tablet 2 mg  2 mg Oral Q6H [facility]    Followed by   • [START ON 3/30/2023] dexamethasone (DECADRON) tablet 2 mg  2 mg Oral Q8H [facility]    Followed by   • [START ON 3/31/2023] dexamethasone (DECADRON) tablet 2 mg  2 mg Oral Q12H [facility]    Followed by   • [START ON 4/3/2023] dexamethasone (DECADRON) tablet 2 mg  2 mg Oral Q24H [facility]   • docusate sodium (COLACE) capsule 100 mg  100 mg Oral BID   • enoxaparin (LOVENOX) subcutaneous injection 40 mg  40 mg Subcutaneous Daily   • hydrALAZINE (APRESOLINE) injection 10 mg  10 mg Intravenous Q4H PRN • HYDROmorphone HCl (DILAUDID) injection 0 2 mg  0 2 mg Intravenous Q4H PRN   • levETIRAcetam (KEPPRA) tablet 500 mg  500 mg Oral Q12H [address]   • losartan (COZAAR) tablet 50 mg  50 mg Oral Daily   • melatonin tablet 3 mg  3 mg Oral HS   • ondansetron (ZOFRAN) injection 4 mg  4 mg Intravenous Q6H PRN   • oxyCODONE (ROXICODONE) IR tablet 2 5 mg  2 5 mg Oral Q4H PRN   • oxyCODONE (ROXICODONE) IR tablet 5 mg  5 mg Oral Q4H PRN   • pantoprazole (PROTONIX) EC tablet 40 mg  40 mg Oral Early Morning   • senna (SENOKOT) tablet 8 6 mg  1 tablet Oral Daily             Jim Buckner DO   Family Medicine

## 2023-03-28 NOTE — QUICK NOTE
EVENING ROUNDS:     Kelli Rodriguez is a 69 y o  male seen at bedside  Wife accompanying patient at bedside  Patient is doing well at this time, no complaints and no questions or concerns  Discussed plan that we are waiting for placement  Patient and wife are agreeable to plan  Pt's scalp incision is c/d/i          Ramona Ziegler  PGY-1  [facility]

## 2023-03-28 NOTE — PLAN OF CARE
Problem: MOBILITY - ADULT  Goal: Maintain or return to baseline ADL function  Description: INTERVENTIONS:  -  Assess patient's ability to carry out ADLs; assess patient's baseline for ADL function and identify physical deficits which impact ability to perform ADLs (bathing, care of mouth/teeth, toileting, grooming, dressing, etc )  - Assess/evaluate cause of self-care deficits   - Assess range of motion  - Assess patient's mobility; develop plan if impaired  - Assess patient's need for assistive devices and provide as appropriate  - Encourage maximum independence but intervene and supervise when necessary  - Involve family in performance of ADLs  - Assess for home care needs following discharge   - Consider OT consult to assist with ADL evaluation and planning for discharge  - Provide patient education as appropriate  Outcome: Progressing  Goal: Maintains/Returns to pre admission functional level  Description: INTERVENTIONS:  - Perform BMAT or MOVE assessment daily    - Set and communicate daily mobility goal to care team and patient/family/caregiver  - Collaborate with rehabilitation services on mobility goals if consulted  - Perform Range of Motion *** times a day  - Reposition patient every *** hours    - Dangle patient *** times a day  - Stand patient *** times a day  - Ambulate patient *** times a day  - Out of bed to chair *** times a day   - Out of bed for meals *** times a day  - Out of bed for toileting  - Record patient progress and toleration of activity level   Outcome: Progressing     Problem: PAIN - ADULT  Goal: Verbalizes/displays adequate comfort level or baseline comfort level  Description: Interventions:  - Encourage patient to monitor pain and request assistance  - Assess pain using appropriate pain scale  - Administer analgesics based on type and severity of pain and evaluate response  - Implement non-pharmacological measures as appropriate and evaluate response  - Consider cultural and social influences on pain and pain management  - Notify physician/advanced practitioner if interventions unsuccessful or patient reports new pain  Outcome: Progressing     Problem: INFECTION - ADULT  Goal: Absence or prevention of progression during hospitalization  Description: INTERVENTIONS:  - Assess and monitor for signs and symptoms of infection  - Monitor lab/diagnostic results  - Monitor all insertion sites, i e  indwelling lines, tubes, and drains  - Monitor endotracheal if appropriate and nasal secretions for changes in amount and color  - [place] appropriate cooling/warming therapies per order  - Administer medications as ordered  - Instruct and encourage patient and family to use good hand hygiene technique  - Identify and instruct in appropriate isolation precautions for identified infection/condition  Outcome: Progressing  Goal: Absence of fever/infection during neutropenic period  Description: INTERVENTIONS:  - Monitor WBC    Outcome: Progressing

## 2023-03-28 NOTE — PLAN OF CARE
Problem: MOBILITY - ADULT  Goal: Maintain or return to baseline ADL function  Description: INTERVENTIONS:  -  Assess patient's ability to carry out ADLs; assess patient's baseline for ADL function and identify physical deficits which impact ability to perform ADLs (bathing, care of mouth/teeth, toileting, grooming, dressing, etc )  - Assess/evaluate cause of self-care deficits   - Assess range of motion  - Assess patient's mobility; develop plan if impaired  - Assess patient's need for assistive devices and provide as appropriate  - Encourage maximum independence but intervene and supervise when necessary  - Involve family in performance of ADLs  - Assess for home care needs following discharge   - Consider OT consult to assist with ADL evaluation and planning for discharge  - Provide patient education as appropriate  Outcome: Progressing  Goal: Maintains/Returns to pre admission functional level  Description: INTERVENTIONS:  - Perform BMAT or MOVE assessment daily    - Set and communicate daily mobility goal to care team and patient/family/caregiver  - Collaborate with rehabilitation services on mobility goals if consulted  - Perform Range of Motion 3 times a day  - Reposition patient every 2 hours    - Dangle patient 3 times a day  - Stand patient 3 times a day  - Ambulate patient 3 times a day  - Out of bed to chair 3 times a day   - Out of bed for meals 3 times a day  - Out of bed for toileting  - Record patient progress and toleration of activity level   Outcome: Progressing     Problem: PAIN - ADULT  Goal: Verbalizes/displays adequate comfort level or baseline comfort level  Description: Interventions:  - Encourage patient to monitor pain and request assistance  - Assess pain using appropriate pain scale  - Administer analgesics based on type and severity of pain and evaluate response  - Implement non-pharmacological measures as appropriate and evaluate response  - Consider cultural and social influences on pain and pain management  - Notify physician/advanced practitioner if interventions unsuccessful or patient reports new pain  Outcome: Progressing     Problem: INFECTION - ADULT  Goal: Absence or prevention of progression during hospitalization  Description: INTERVENTIONS:  - Assess and monitor for signs and symptoms of infection  - Monitor lab/diagnostic results  - Monitor all insertion sites, i e  indwelling lines, tubes, and drains  - Monitor endotracheal if appropriate and nasal secretions for changes in amount and color  - [place] appropriate cooling/warming therapies per order  - Administer medications as ordered  - Instruct and encourage patient and family to use good hand hygiene technique  - Identify and instruct in appropriate isolation precautions for identified infection/condition  Outcome: Progressing  Goal: Absence of fever/infection during neutropenic period  Description: INTERVENTIONS:  - Monitor WBC    Outcome: Progressing     Problem: SAFETY ADULT  Goal: Maintain or return to baseline ADL function  Description: INTERVENTIONS:  -  Assess patient's ability to carry out ADLs; assess patient's baseline for ADL function and identify physical deficits which impact ability to perform ADLs (bathing, care of mouth/teeth, toileting, grooming, dressing, etc )  - Assess/evaluate cause of self-care deficits   - Assess range of motion  - Assess patient's mobility; develop plan if impaired  - Assess patient's need for assistive devices and provide as appropriate  - Encourage maximum independence but intervene and supervise when necessary  - Involve family in performance of ADLs  - Assess for home care needs following discharge   - Consider OT consult to assist with ADL evaluation and planning for discharge  - Provide patient education as appropriate  Outcome: Progressing  Goal: Maintains/Returns to pre admission functional level  Description: INTERVENTIONS:  - Perform BMAT or MOVE assessment daily    - Set and communicate daily mobility goal to care team and patient/family/caregiver  - Collaborate with rehabilitation services on mobility goals if consulted  - Perform Range of Motion 3 times a day  - Reposition patient every 2 hours    - Dangle patient 3 times a day  - Stand patient 3 times a day  - Ambulate patient 3 times a day  - Out of bed to chair 3 times a day   - Out of bed for meals 3 times a day  - Out of bed for toileting  - Record patient progress and toleration of activity level   Outcome: Progressing  Goal: Patient will remain free of falls  Description: INTERVENTIONS:  - Educate patient/family on patient safety including physical limitations  - Instruct patient to call for assistance with activity   - Consult OT/PT to assist with strengthening/mobility   - Keep Call bell within reach  - Keep bed low and locked with side rails adjusted as appropriate  - Keep care items and personal belongings within reach  - Initiate and maintain comfort rounds  - Make Fall Risk Sign visible to staff  - Apply yellow socks and bracelet for high fall risk patients  - Consider moving patient to room near nurses station  Outcome: Progressing     Problem: NEUROSENSORY - ADULT  Goal: Achieves stable or improved neurological status  Description: INTERVENTIONS  - Monitor and report changes in neurological status  - Monitor vital signs such as temperature, blood pressure, glucose, and any other labs ordered   - Initiate measures to prevent increased intracranial pressure  - Monitor for seizure activity and implement precautions if appropriate      Outcome: Progressing  Goal: Remains free of injury related to seizures activity  Description: INTERVENTIONS  - Maintain airway, patient safety  and administer oxygen as ordered  - Monitor patient for seizure activity, document and report duration and description of seizure to physician/advanced practitioner  - If seizure occurs,  ensure patient safety during seizure  - Reorient patient post seizure  - Seizure pads on all 4 side rails  - Instruct patient/family to notify RN of any seizure activity including if an aura is experienced  - Instruct patient/family to call for assistance with activity based on nursing assessment  - Administer anti-seizure medications if ordered    Outcome: Progressing  Goal: Achieves maximal functionality and self care  Description: INTERVENTIONS  - Monitor swallowing and airway patency with patient fatigue and changes in neurological status  - Encourage and assist patient to increase activity and self care     - Encourage visually impaired, hearing impaired and aphasic patients to use assistive/communication devices  Outcome: Progressing     Problem: Prexisting or High Potential for Compromised Skin Integrity  Goal: Skin integrity is maintained or improved  Description: INTERVENTIONS:  - Identify patients at risk for skin breakdown  - Assess and monitor skin integrity  - Assess and monitor nutrition and hydration status  - Monitor labs   - Assess for incontinence   - Turn and reposition patient  - Assist with mobility/ambulation  - Relieve pressure over bony prominences  - Avoid friction and shearing  - Provide appropriate hygiene as needed including keeping skin clean and dry  - Evaluate need for skin moisturizer/barrier cream  - Collaborate with interdisciplinary team   - Patient/family teaching  - Consider wound care consult   Outcome: Progressing

## 2023-03-28 NOTE — CASE MANAGEMENT
Case Management Discharge Planning Note    Patient name [facility]  [facility] [address] [facility] [id number]  : 1955 Date 3/28/2023       Current Admission Date: 3/20/2023  Current Admission Diagnosis:Brain mass   Patient Active Problem List    Diagnosis Date Noted   • Mcclelland catheter in place 2023   • Lung nodule 2023   • Brain mass 2023   • Brain compression ([address] ) 2023   • Cerebral edema ([address] ) 2023   • Hypercholesterolemia 2021   • CVA (cerebral vascular accident) ([address] ) 2021   • Essential hypertension 2020   • Annual physical exam 2020   • Negative depression screening 2020   • Overweight (BMI 25 0-29 9) 2020      LOS (days): 8  Geometric Mean LOS (GMLOS) (days): 6 60  Days to GMLOS:-0 2     OBJECTIVE:  Risk of Unplanned Readmission Score: 12 35         Current admission status: Inpatient   Preferred Pharmacy:    [address]  Phone: [phone] Fax: [phone]    Primary Care Provider: Ren Gomez DO    Primary Insurance: BLUE CROSS  Secondary Insurance: MEDICARE    DISCHARGE DETAILS:      Other Referral/Resources/Interventions Provided:  Referral Comments: TT from [facility] they do not accept pt insurnace  CM also notifieed by [facility] that they do not accept and he has no OON benefits       Family notified[de-identified] CM reached out to pt spouse Bossman Darby [phone] regarding referrals that have been snt  CM advisesd Bossman that [facility] lisa not accept his insurance, the [facility] does not accept insurance and he has no OON benefits  Spouse stated that htis is incorrect  she spoke wiht the  and was told that his benefits are acacepted  She questioned why the hospital accepts his insurance but the [facility] does not  CM read denial from [facility] to spouse  Spouse will follow up with her    Spouse then indicated that pt has [place] [employer]  She has been told that the insurance needs to be run through [place] or it will be denied  CM will f/u  Additional Comments: CM reached out to [facility] to confirm insurance that was run was [place]  CM was told that when they reached out to [place] on Friday, they were told that Deisi Diallo was OON  [facility] rep reviewed this info with spouse   ZACK will f/u and speak with [place] again

## 2023-03-28 NOTE — TELEPHONE ENCOUNTER
Patient is status post complex Mcclelland catheter insertion in the operating room with Dr Roque Sarmiento, operative note not available  Patient will require Mcclelland catheter for 1 to 2 weeks post insertion  He will require void trial in 1 to 2 weeks from surgical intervention  Plan is for discharge to rehab  Can consider Mcclelland catheter removal at rehab in a m  and appointment in afternoon given complex placement  Assist with scheduling    Surgery date was  3/23

## 2023-03-28 NOTE — ARC ADMISSION
Notified by CM that patient's spouse is stating that [facility] is in network with patient's insurance plan per her employer, and was requesting for [facility] to double check with insurance regarding network status  [facility]: [id number]  Was notified that patient has an EPO plan, and if [facility] is in part with our local [employer], that we are in network with patient's insurance plan  Spoke with Miguel Bowman,  with [employer] plan to plan services, who confirmed that [facility] is in part with our local [address]  CM has been updated  Will plan to meet with CM and patient's spouse, patient at bedside later this afternoon to further discuss

## 2023-03-29 ENCOUNTER — PATIENT OUTREACH (OUTPATIENT)
Dept: RADIATION ONCOLOGY | Facility: HOSPITAL | Age: 68
End: 2023-03-29

## 2023-03-29 LAB
ANION GAP SERPL CALCULATED.3IONS-SCNC: 4 MMOL/L (ref 4–13)
BASOPHILS # BLD MANUAL: 0 THOUSAND/UL (ref 0–0.1)
BASOPHILS NFR MAR MANUAL: 0 % (ref 0–1)
BUN SERPL-MCNC: 20 MG/DL (ref 5–25)
CALCIUM SERPL-MCNC: 8.1 MG/DL (ref 8.3–10.1)
CHLORIDE SERPL-SCNC: 108 MMOL/L (ref 96–108)
CO2 SERPL-SCNC: 25 MMOL/L (ref 21–32)
CREAT SERPL-MCNC: 0.68 MG/DL (ref 0.6–1.3)
EOSINOPHIL # BLD MANUAL: 0 THOUSAND/UL (ref 0–0.4)
EOSINOPHIL NFR BLD MANUAL: 0 % (ref 0–6)
ERYTHROCYTE [DISTWIDTH] IN BLOOD BY AUTOMATED COUNT: 12.8 % (ref 11.6–15.1)
FLUAV RNA RESP QL NAA+PROBE: NEGATIVE
FLUBV RNA RESP QL NAA+PROBE: NEGATIVE
GFR SERPL CREATININE-BSD FRML MDRD: 97 ML/MIN/1.73SQ M
GLUCOSE SERPL-MCNC: 103 MG/DL (ref 65–140)
GLUCOSE SERPL-MCNC: 113 MG/DL (ref 65–140)
GLUCOSE SERPL-MCNC: 120 MG/DL (ref 65–140)
GLUCOSE SERPL-MCNC: 127 MG/DL (ref 65–140)
GLUCOSE SERPL-MCNC: 148 MG/DL (ref 65–140)
HCT VFR BLD AUTO: 41.7 % (ref 36.5–49.3)
HGB BLD-MCNC: 13.5 G/DL (ref 12–17)
LYMPHOCYTES # BLD AUTO: 0.61 THOUSAND/UL (ref 0.6–4.47)
LYMPHOCYTES # BLD AUTO: 5 % (ref 14–44)
MCH RBC QN AUTO: 29.6 PG (ref 26.8–34.3)
MCHC RBC AUTO-ENTMCNC: 32.4 G/DL (ref 31.4–37.4)
MCV RBC AUTO: 91 FL (ref 82–98)
METAMYELOCYTES NFR BLD MANUAL: 1 % (ref 0–1)
MONOCYTES # BLD AUTO: 1.21 THOUSAND/UL (ref 0–1.22)
MONOCYTES NFR BLD: 10 % (ref 4–12)
NEUTROPHILS # BLD MANUAL: 9.83 THOUSAND/UL (ref 1.85–7.62)
NEUTS SEG NFR BLD AUTO: 81 % (ref 43–75)
PLATELET # BLD AUTO: 217 THOUSANDS/UL (ref 149–390)
PLATELET BLD QL SMEAR: ADEQUATE
PMV BLD AUTO: 10.2 FL (ref 8.9–12.7)
POLYCHROMASIA BLD QL SMEAR: PRESENT
POTASSIUM SERPL-SCNC: 4.1 MMOL/L (ref 3.5–5.3)
RBC # BLD AUTO: 4.56 MILLION/UL (ref 3.88–5.62)
RBC MORPH BLD: PRESENT
RSV RNA RESP QL NAA+PROBE: NEGATIVE
SARS-COV-2 RNA RESP QL NAA+PROBE: NEGATIVE
SODIUM SERPL-SCNC: 137 MMOL/L (ref 135–147)
VARIANT LYMPHS # BLD AUTO: 3 %
WBC # BLD AUTO: 12.14 THOUSAND/UL (ref 4.31–10.16)

## 2023-03-29 RX ADMIN — DEXAMETHASONE 2 MG: 2 TABLET ORAL at 00:26

## 2023-03-29 RX ADMIN — SENNOSIDES 8.6 MG: 8.6 TABLET, FILM COATED ORAL at 09:06

## 2023-03-29 RX ADMIN — AMLODIPINE BESYLATE 10 MG: 10 TABLET ORAL at 09:06

## 2023-03-29 RX ADMIN — LOSARTAN POTASSIUM 50 MG: 50 TABLET, FILM COATED ORAL at 09:06

## 2023-03-29 RX ADMIN — LEVETIRACETAM 500 MG: 500 TABLET, FILM COATED ORAL at 09:06

## 2023-03-29 RX ADMIN — PANTOPRAZOLE SODIUM 40 MG: 40 TABLET, DELAYED RELEASE ORAL at 05:38

## 2023-03-29 RX ADMIN — LEVETIRACETAM 500 MG: 500 TABLET, FILM COATED ORAL at 21:14

## 2023-03-29 RX ADMIN — DEXAMETHASONE 2 MG: 2 TABLET ORAL at 17:13

## 2023-03-29 RX ADMIN — DEXAMETHASONE 2 MG: 2 TABLET ORAL at 11:48

## 2023-03-29 RX ADMIN — DEXAMETHASONE 2 MG: 2 TABLET ORAL at 23:57

## 2023-03-29 RX ADMIN — MELATONIN 3 MG: at 21:14

## 2023-03-29 RX ADMIN — ENOXAPARIN SODIUM 40 MG: 40 INJECTION SUBCUTANEOUS at 09:06

## 2023-03-29 RX ADMIN — ATORVASTATIN CALCIUM 40 MG: 40 TABLET, FILM COATED ORAL at 17:14

## 2023-03-29 RX ADMIN — DEXAMETHASONE 2 MG: 2 TABLET ORAL at 05:38

## 2023-03-29 RX ADMIN — DOCUSATE SODIUM 100 MG: 100 CAPSULE, LIQUID FILLED ORAL at 09:06

## 2023-03-29 RX ADMIN — ACETAMINOPHEN 975 MG: 325 TABLET ORAL at 03:40

## 2023-03-29 RX ADMIN — DOCUSATE SODIUM 100 MG: 100 CAPSULE, LIQUID FILLED ORAL at 17:14

## 2023-03-29 NOTE — PHYSICAL THERAPY NOTE
PHYSICAL THERAPY NOTE          Patient Name: Pattie Santos  UBBAX'S Date: 3/29/2023       03/29/23 0900   PT Last Visit   PT Visit Date 03/29/23   Note Type   Note Type Treatment for insurance authorization   Pain Assessment   Pain Assessment Tool 0-10   Pain Score No Pain   Restrictions/Precautions   Weight Bearing Precautions Per Order No   Other Precautions Bed Alarm; Fall Risk; Chair Alarm  (L hemiparesis, s/p craniotomy)   General   Chart Reviewed Yes   Response to Previous Treatment Patient with no complaints from previous session  Family/Caregiver Present No   Cognition   Overall Cognitive Status WFL   Arousal/Participation Cooperative   Attention Attends with cues to redirect   Orientation Level Oriented X4   Memory Within functional limits   Following Commands Follows multistep commands without difficulty   Comments very pleasant to work with   Subjective   Subjective agreeable to participate   Bed Mobility   Supine to Sit Unable to assess   Sit to Supine Unable to assess   Additional Comments presented with OT and returned seated in chair with alarm upon conclusion   Transfers   Sit to Stand 4  Minimal assistance   Additional items Assist x 1; Increased time required;Verbal cues   Stand to Sit 4  Minimal assistance   Additional items Assist x 1; Increased time required;Verbal cues   Stand pivot 4  Minimal assistance   Additional items Assist x 1; Increased time required;Verbal cues   Additional Comments c RW; x4 STS +10 consecutive at anterior HR with b/l UE support and feet staggered to promote LLE WB   Ambulation/Elevation   Gait pattern L Hemiparesis; Improper Weight shift;Decreased foot clearance;Decreased L stance;[place]rend[place]burg; Short stride; Excessively slow  (L hip abductor weakness)   Gait Assistance 4  Minimal assist   Additional items Assist x 1;Verbal cues   Assistive Device Rolling walker   Distance 60'x2  (seated rest bw) Stair Management Assistance 3  Moderate assist   Additional items Assist x 1;Verbal cues; Increased time required   Stair Management Technique One rail R;Two rails; Step to pattern; Foreward;Nonreciprocal  (R HR up, b/l down)   Number of Stairs 7   Ambulation/Elevation Additional Comments VC to move LUE down on HR   Balance   Static Sitting Fair +   Dynamic Sitting Fair   Static Standing Fair -   Dynamic Standing Poor +   Ambulatory Poor +   Endurance Deficit   Endurance Deficit Yes   Endurance Deficit Description fatigue   Activity Tolerance   Activity Tolerance Patient tolerated treatment well   Medical Staff Made Aware sai childs tech   Nurse Made Aware yes-cleared to mobilize   Exercises   Hip Abduction Standing;10 reps;AROM; Bilateral  (at HR with b/l UE support and minAx1; educated on sidelying clamshells)   Assessment   Prognosis Good   Problem List Decreased strength;Decreased endurance; Impaired balance;Decreased mobility; Decreased coordination   Assessment Pt agreeable to participate in PT session  Pt performed functional mobility and therex as outlined above  Improved mobility since IE, Ax1 for all  L hip abductors weak and trendelenburg gait noted  Hip abductor therex initiated and VC to prevent hip collapse  Favors R LE for STS transfers-staggered stand with LLE closer to chair to promote LLE use  VC for L WS to promote WB  Educated on continued use of LUE especially with grasping/releasing objects as pt has difficulty letting go of objects including HR with stair negotiation  Pt left seated in chair with chair alarm, call bell, phone, and all personal needs within reach  Pt will continue to benefit from skilled acute care PT to further address their functional mobility limitations  D/C recommendations remain acute rehab as pt continues to function well below baseline     Barriers to Discharge Inaccessible home environment;Decreased caregiver support   Goals   Patient Goals to improve   STG Expiration Date 04/05/23   PT Treatment Day 2   Plan   Treatment/Interventions Functional transfer training;LE strengthening/ROM; Elevations; Therapeutic exercise; Endurance training;Patient/family training;Equipment eval/education; Bed mobility;Gait training;Spoke to nursing;Spoke to case management;OT   Progress Progressing toward goals   PT Frequency 3-5x/wk   Recommendation   PT Discharge Recommendation Post acute rehabilitation services   Equipment Recommended [facility] Recommended Wheeled walker   AM-PAC Basic Mobility Inpatient   Turning in Flat Bed Without Bedrails 3   Lying on Back to Sitting on Edge of Flat Bed Without Bedrails 3   Moving Bed to Chair 3   Standing Up From Chair Using Arms 3   Walk in Room 3   Climb 3-5 Stairs With Railing 2   Basic Mobility Inpatient Raw Score 17   Basic Mobility Standardized Score 39 67   Highest Level Of Mobility   JH-HLM Goal 5: Stand one or more mins   JH-HLM Achieved 7: Walk 25 feet or more   Carlos Pérez, PT, DPT

## 2023-03-29 NOTE — ARC ADMISSION
Reviewed patient's case with [facility] physician - patient is approved for [facility] pending insurance authorization and bed availability  CM has been updated  CM to request insurance authorization be initiated by Discharge Support Team     [facility] NPI: [id number]  [facility] Physician NPI: [id number]    Will continue to follow patient's case at this time

## 2023-03-29 NOTE — RESTORATIVE TECHNICIAN NOTE
Restorative Technician Note      Patient Name: Carmen De Anda     Note Type: Mobility  Patient Position Upon Consult: Bedside chair  Activity Performed: Ambulated; Dangled; Stood  Assistive Device: Roller walker  Education Provided: Yes  Patient Position at [employer] of Consult: Bedside chair;  All needs within reach; Bed/Chair alarm activated    Kendell CORNEJO, Restorative Technician, [employer]

## 2023-03-29 NOTE — PROGRESS NOTES
PHYSICAL MEDICINE AND REHABILITATION   PREADMISSION ASSESSMENT     Projected [facility] Diagnoses:  Impairment of mobility, safety and Activities of Daily Living (ADLs) due to Brain Dysfunction:  02 1  Non-Traumatic  Etiologic Dx: Right Frontal Brain Metastasis  Date of Onset: 3/19/2023   Date of surgery: 3/23/2023 Right - Right frontal CRANIOTOMY IMAGE-GUIDED FOR TUMOR EUA  DEL CASTILLO INSERTION    PATIENT INFORMATION  Name: Sukhjinder Schneider Phone #: [phone] (home)   Address: [place] [id number]  YOB: 1955 Age: 69 y o  SS#   Marital Status: /Civil Union  Ethnicity:   Employment Status: retired  Extended Emergency Contact Information  Primary Emergency Contact: [address] [place] [address]   Mobile Phone: [phone]  Relation: Spouse  Advance Directive: Level 1 Full Code - unknown advanced directive    INSURANCE/COVERAGE:     Primary Payor: BLUE CROSS / Plan: [employer] / Product Type: Blue Fee for Service / [employer] Secondary Payer: Medicare/Medicare Part A Only   Payer Contact:  Payer Contact:   Contact Phone:  Contact Phone:     Authorization #:   Coverage Dates:  LCD:   MEDICARE #: N/A  Medicare Days: N/A  Medical Record #: [id number]     **Confirmed benefits for inpatient acute rehab with Javi Ordoñez, , as follows: $0 deductible, $8500 out of pocket maximum  Also confirmed with Grady CHIRINOS, , that [facility] is in network with [employer] plan since we are in par with our local   Nessa [address]  REFERRAL SOURCE:   Referring provider: Demar Romero MD  Referring facility: [address] [place] [address]  Room: [facility] 72/[facility] 729-  PCP: Rodolfo Allison DO PCP phone number: [phone]    MEDICAL INFORMATION  HPI: Patient is a 69year old male that presented to [facility] on 3/19/2023 with complaints of progressively worsening right sided weakness over the past week   He has been stumbling at home and having difficulty ambulating  Also endorses SOB  CTA head/neck showed right frontal lobe cystic lesion with surrounding vasogenic edema most consistent with metastatic disease; RUL mass with right hilar adenopathy  MRI brain showed rim-enhancing 2 4cm centrally necrotic metastatic lesion in right frontal lobe with surrounding severe vasogenic edema, mass effect in right hemisphere with approximately 2 4mm right-left midline shift; small focus of anterior cortical frontal acute to subacute ischemia  Patient was transferred to [facility] for Neurosurgery evaluation  He was initiated on Decardon and Keppra  Neurology was consulted, with unable to rule out embolic infarct d/t hypercoagulable state related to malignancy  Patient was continued on ASA and initiated on statin  Neurosurgery was consulted, with no emergent surgical intervention warranted  Recommendations were made to continue on Decadron and Keppra, with holding ASA given possible need for eventual surgery  CT C/A/P showed 3 8 x 3 5cm RUL lung mass with associated overlying pleural tag and adjacent to pleural thickening; right hilar lymphadenopathy; small lower right paratracheal left paratracheal lymph node  Pulmonology was consulted, with recommendations to defer on biopsy of lung mass unless brain mass came back negative  Suspected patient with likely primary lung cancer with metastasis to mediastinal lymph nodes and brain  Medical Oncology was consulted, with patient to follow-up outpatient, and recommendations to be made pending pathology results  Full body scan was completed, which showed no metastatic disease  Repeat MRI brain with DTI on 3/22 showed no interval changes, with stable known brain mass with high suspicion for metastatic disease (lung)  ECHO showed an EF of 98%, grade 1 diastolic dysfunction, mild LA dilation and mild TVR   On 3/23, patient went to the OR with Neurosurgery for right frontal craniotomy image-guided for tumor EUA and melton insertion; EBL minimal  Frozen pathology consistent with metastatic adenocarcinoma  Per Neurosurgery, Decadron wean was initiated, and all AC/AP is to remain on hold for at least 2 weeks  He was cleared to start Lovenox SQ for DVT prophylaxis  Urology was consulted regarding difficult melton insertion intraop due to bladder neck contracture/urethral stricture and BPH  Recommendations were made to maintain melton catheter, with plans for voiding trial at rehab  Preliminary biopsy results showing non-small cell carcinoma, with molecular study pending  *** Patient is overall hemodynamically stable and medically cleared for discharge to [facility]  PT/OT therapies were consulted, as well as patient's case reviewed with [facility] physician, and they are recommending patient for inpatient Acute Rehab  He has demonstrated that he can tolerate and participate in 3 hours of therapy per day  Received both Pfizer vaccines; COVID test resulted negative on 3/29/2023  Past Medical History:   Past Surgical History:    Allergies:     Past Medical History:   Diagnosis Date   • Hypertension    • Prostate cancer ([facility] [address] ) 2001   • Rectal bleeding    • Stroke [facility])     2011      Past Surgical History:   Procedure Laterality Date   • COLONOSCOPY     • CRANIOTOMY Right 3/23/2023    Procedure: Right frontal CRANIOTOMY IMAGE-GUIDED FOR TUMOR;  Surgeon: Melissa Kinsey MD;  Location: [facility];  Service: Neurosurgery   • SC CYSTOURETHROSCOPY N/A 3/23/2023    Procedure: EUA, MELTON INSERTION;  Surgeon: Sacha Haider MD;  Location: [facility];  Service: Urology   • PROSTATECTOMY  2001   • TONSILLECTOMY       No Known Allergies      Medical/functional conditions requiring inpatient rehabilitation: right frontal brain metastasis s/p right frontal craniotomy for tumor resection, right sided weakness, RUL mass, frontal infarct, difficult catheter insertion s/p melton, cerebral edema, BPH, impaired mobility and self care    Risk for medical/clinical complications: risk for falls, risk for seizures, risk for uncontrolled pain, risk for skin breakdown, risk for DVT/PE, risk for infection, risk for hypo/hypertensive episodes    Comorbidities/Surgeries in the last 100 days: 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, HTN, prostate cancer s/p prostatectomy, 3/23/2023 Right - Right frontal CRANIOTOMY IMAGE-GUIDED FOR TUMOR EUA  DEL CASTILLO INSERTION    CURRENT VITAL SIGNS:   Temp:  [97 9 °F (36 6 °C)-98 2 °F (36 8 °C)] 98 2 °F (36 8 °C)  HR:  [60-75] 60  Resp:  [16] 16  BP: (126-165)/(73-93) 165/89   Intake/Output Summary (Last 24 hours) at 3/29/2023 1346  Last data filed at 3/29/2023 0523  Gross per 24 hour   Intake 480 ml   Output 2450 ml   Net -1970 ml        LABORATORY RESULTS:      Lab Results   Component Value Date    HGB 13 5 03/29/2023    HGB 14 6 05/10/2018    HCT 41 7 03/29/2023    HCT 43 6 05/10/2018    WBC 12 14 (H) 03/29/2023    WBC 7 1 05/10/2018     Lab Results   Component Value Date    BUN 20 03/29/2023    BUN 17 05/10/2018     05/10/2018    K 4 1 03/29/2023    K 3 6 05/10/2018     03/29/2023     05/10/2018    CREATININE 0 68 03/29/2023    CREATININE 0 80 05/10/2018     Lab Results   Component Value Date    PROTIME 14 0 03/24/2023    INR 1 06 03/24/2023        DIAGNOSTIC STUDIES:  MRI brain w wo contrast    Result Date: 3/24/2023  Impression: 1  Expected postsurgical change from right parietal craniotomy and resection of cystic mass in the frontoparietal region  2   Stable vasogenic edema surrounding the resection cavity  Right to left midline shift of 2 mm  Workstation performed: [id number]     NM bone scan whole body    Result Date: 3/22/2023  Impression: 1  No focal tracer activity characteristic of osseous metastatic disease   Workstation performed: [id number]     CT chest abdomen pelvis w contrast    Result Date: 3/21/2023  Impression: 3 8 x 3 5 cm right upper lobe lung mass with associated overlying pleural tag and adjacent to pleural thickening, this may be due to pleural reaction or mild pleural invasion No contralateral lung nodules or mass Right hilar lymphadenopathy  Small lower right paratracheal left paratracheal lymph node do not meet the criteria for pathologic enlargement on the bases of size or morphology There is no CT evidence of for metastatic disease in the abdomen and pelvis No lytic destructive lesion in the visualized bony skeleton The study was marked in EPIC for significant notification  Workstation performed: [id number]     MRI Brain BT w wo Contrast    Result Date: 3/22/2023  Impression: Unchanged 2 5 cm right parasagittal frontal lobe vertex mass with central necrosis and marked perilesional vasogenic edema unchanged  Favor metastatic disease (given lung mass) over primary high-grade glioma  Unchanged small subacute infarct in right frontal lobe  Unchanged 0 2 cm leftward midline shift  No new acute intracranial abnormality  Workstation performed: [id number]       PRECAUTIONS/SPECIAL NEEDS:  Tobacco:   Social History     Tobacco Use   Smoking Status Former   • Packs/day: 1 00   • Years: 15 00   • Pack years: 15 00   • Types: Cigarettes   • Passive exposure: Never   Smokeless Tobacco Never   Tobacco Comments    i quit when i was 28  not sure of exact dates   , Alcohol:    Social History     Substance and Sexual Activity   Alcohol Use Yes   • Alcohol/week: 6 0 standard drinks   • Types: 6 Cans of beer per week    Comment: does not drink every day   , Anticoagulation:  Lovenox SQ, Edema Management, Safety Concerns, Pain Management, Aspiration Risk/Precautions, Visually Impaired, Language Preference: English, Seizure Precautions and Fall Precautions      MEDICATIONS:     Current Facility-Administered Medications:   •  acetaminophen (TYLENOL) tablet 975 mg, 975 mg, Oral, Q8H PRN, Kalie Butler DO, 975 mg at 03/29/23 0340  •  amLODIPine (NORVASC) tablet 10 mg, 10 mg, Oral, Daily, [place] Lone, DO, 10 mg at 03/29/23 3471  •  atorvastatin (LIPITOR) tablet 40 mg, 40 mg, Oral, After Dinner, Gibran Porter DO, 40 mg at 03/28/23 1740  •  bisacodyl (DULCOLAX) rectal suppository 10 mg, 10 mg, Rectal, Daily PRN, Gibran Porter DO  •  [COMPLETED] dexamethasone (DECADRON) tablet 4 mg, 4 mg, Oral, Q6H LEN, 4 mg at 03/25/23 1138 **FOLLOWED BY** [COMPLETED] dexamethasone (DECADRON) tablet 4 mg, 4 mg, Oral, Q8H [facility], 4 mg at 03/27/23 1300 **FOLLOWED BY** dexamethasone (DECADRON) tablet 2 mg, 2 mg, Oral, Q6H [facility], 2 mg at 03/29/23 1148 **FOLLOWED BY** [START ON 3/30/2023] dexamethasone (DECADRON) tablet 2 mg, 2 mg, Oral, Q8H [facility] **FOLLOWED BY** [START ON 3/31/2023] dexamethasone (DECADRON) tablet 2 mg, 2 mg, Oral, Q12H [facility] **FOLLOWED BY** [START ON 4/3/2023] dexamethasone (DECADRON) tablet 2 mg, 2 mg, Oral, Q24H [facility], Kalie Butler DO  •  docusate sodium (COLACE) capsule 100 mg, 100 mg, Oral, BID, Kalie Butler DO, 100 mg at 03/29/23 0906  •  enoxaparin (LOVENOX) subcutaneous injection 40 mg, 40 mg, Subcutaneous, Daily, Val Montenegro MD, 40 mg at 03/29/23 0520  •  hydrALAZINE (APRESOLINE) injection 10 mg, 10 mg, Intravenous, Q4H PRN, Gibran Porter DO, 10 mg at 03/27/23 2213  •  HYDROmorphone HCl (DILAUDID) injection 0 2 mg, 0 2 mg, Intravenous, Q4H PRN, Gibran Porter DO, 0 2 mg at 03/26/23 0155  •  levETIRAcetam (KEPPRA) tablet 500 mg, 500 mg, Oral, Q12H [facility], Kalie Butler DO, 500 mg at 03/29/23 0906  •  losartan (COZAAR) tablet 50 mg, 50 mg, Oral, Daily, Kalie Butler DO, 50 mg at 03/29/23 4481  •  melatonin tablet 3 mg, 3 mg, Oral, HS, Crystal Felipe Montenegro MD, 3 mg at 03/28/23 2128  •  ondansetron (ZOFRAN) injection 4 mg, 4 mg, Intravenous, Q6H PRN, [place] Lone, DO  •  oxyCODONE (ROXICODONE) IR tablet 2 5 mg, 2 5 mg, Oral, Q4H PRN, Gibran Lone, DO, 2 5 mg at 03/25/23 2128  •  oxyCODONE (ROXICODONE) IR tablet 5 mg, 5 mg, Oral, Q4H PRN, Gibran Lone, DO, 5 mg at 03/25/23 0005  •  pantoprazole (PROTONIX) EC tablet 40 mg, 40 mg, Oral, Early Morning, Kalie Butler DO, 40 mg at 03/29/23 5888  •  senna (SENOKOT) tablet 8 6 mg, 1 tablet, Oral, Daily, Kalie Butler DO, 8 6 mg at 03/29/23 0906    SKIN INTEGRITY:   Incision: healing well, no significant drainage, no dehiscence, no significant erythema, right head incision RONEL    PRIOR LEVEL OF FUNCTION:  He lives in a(n) single family home  Edmundo Carmona is  and lives with their spouse  Self Care: Independent, Indoor Mobility: Independent, Stairs (in/outdoor): Independent and Cognition: Independent  Prior to patient's admission, patient was fully Independent with ADLs and IADLs  Family provides transportation  He was Independent without use of AD for mobility  FALLS IN THE LAST 6 MONTHS: 1 to 4    HOME ENVIRONMENT:  The living area: bedroom on 2nd floor and bathroom on 2nd floor, 1/2 bath on main level  There are 4-5 steps to enter the home with full flight of stairs to 2nd floor of home  The patient will have 24 hour supervision/physical assistance available upon discharge  Patient's spouse works in [employer] and is gone every other week - patient is home alone often  However, spouse is able to work from home if needed      PREVIOUS DME:  Equipment in home (previous DME): Single Point Cane    FUNCTIONAL STATUS:  Physical Therapy Occupational Therapy Speech Therapy   3/29/2023, per PT    Cognition   Overall Cognitive Status [facility]   Arousal/Participation Cooperative   Attention Attends with cues to redirect   Orientation Level Oriented X4   Memory Within functional limits   Following Commands Follows multistep commands without difficulty   Comments very pleasant to work with   Subjective   Subjective agreeable to participate   Bed Mobility   Supine to Sit Unable to assess   Sit to Supine Unable to assess   Additional Comments presented with OT and returned seated in chair with alarm upon conclusion   Transfers   Sit to Stand 4  Minimal assistance   Additional items Assist x 1;Increased time required;Verbal cues   Stand to Sit 4  Minimal assistance   Additional items Assist x 1; Increased time required;Verbal cues   Stand pivot 4  Minimal assistance   Additional items Assist x 1; Increased time required;Verbal cues   Additional Comments c RW; x4 STS +10 consecutive at anterior HR with b/l UE support and feet staggered to promote LLE WB   Ambulation/Elevation   Gait pattern L Hemiparesis; Improper Weight shift;Decreased foot clearance;Decreased L stance;[place]; Short stride; Excessively slow  (L hip abductor weakness)   Gait Assistance 4  Minimal assist   Additional items Assist x 1;Verbal cues   Assistive Device Rolling walker   Distance 60'x2  (seated rest bw)   Stair Management Assistance 3  Moderate assist   Additional items Assist x 1;Verbal cues; Increased time required   Stair Management Technique One rail R;Two rails; Step to pattern; Foreward;Nonreciprocal  (R HR up, b/l down)   Number of Stairs 7   Ambulation/Elevation Additional Comments VC to move LUE down on HR   Balance   Static Sitting Fair +   Dynamic Sitting Fair   Static Standing Fair -   Dynamic Standing Poor +   Ambulatory Poor +   Endurance Deficit   Endurance Deficit Yes   Endurance Deficit Description fatigue   Activity Tolerance   Activity Tolerance Patient tolerated treatment well   Medical Staff Made Aware naz restorative tech   Nurse Made Aware yes-cleared to mobilize   Exercises   Hip Abduction Standing;10 reps;AROM; Bilateral  (at HR with b/l UE support and minAx1; educated on sidelying clamshells)   Assessment   Prognosis Good   Problem List Decreased strength;Decreased endurance; Impaired balance;Decreased mobility; Decreased coordination   Assessment Pt agreeable to participate in PT session  Pt performed functional mobility and therex as outlined above  Improved mobility since IE, Ax1 for all  L hip abductors weak and trendelenburg gait noted  Hip abductor therex initiated and VC to prevent hip collapse  Favors R LE for STS transfers-staggered stand with LLE closer to chair to promote LLE use  VC for L WS to promote WB  Educated on continued use of LUE especially with grasping/releasing objects as pt has difficulty letting go of objects including HR with stair negotiation  Pt left seated in chair with chair alarm, call bell, phone, and all personal needs within reach  Pt will continue to benefit from skilled acute care PT to further address their functional mobility limitations  D/C recommendations remain acute rehab as pt continues to function well below baseline  3/29/2023, per POST    ADL   Where Assessed Sitting at sink   Grooming Assistance 5  Supervision/Setup   Grooming Deficit Setup   UB Bathing Assistance 5  Supervision/Setup   UB Bathing Deficit Setup; Increased time to complete   LB Bathing Assistance 4  Minimal Assistance   UB Dressing Assistance 5  Supervision/Setup   UB Dressing Deficit Thread RUE; Thread LUE;Pull around back   LB Dressing Assistance 3  Moderate Assistance   LB Dressing Deficit Thread RLE into pants; Thread LLE into pants   Bed Mobility   Supine to Sit 4  Minimal assistance   Additional items Assist x 1; Increased time required; Bedrails;HOB elevated   Cognition   Overall Cognitive Status WFL   Arousal/Participation Alert; Responsive; Cooperative   Attention Attends with cues to redirect   Orientation Level Oriented X4   Memory Within functional limits   Following Commands Follows one step commands without difficulty   Activity Tolerance   Activity Tolerance Patient tolerated treatment well   Assessment   Assessment Pt seen for participation in Occupational Therapy session with focus on activity tolerance, bed mob, functional transfers/mob, sitting balance and tolerance and standing tolerance and balance for pt engagement in UB/LB self-care tasks and energy conservation techniques  Pt cleared by FAYE/La for pt participated in OT session   Pt presented supine/HOB raised pt awake/alert and agreeable to participate in therapy following pt identifiers confirmed  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  Pt required assist for functional transfers/mob and UB/LB self-care  2* deconditioning and decreased strength  He reported less strength in his L side than R  Pt was noted for slight loss of balance with static standing at bathroom sink  Therapy assist pt to correct balance  Pt will require post acute rehab service to continue to address these above noted pt deficit which currently impair pt ADL and functional mob  N/A     CARE SCORES:  Self Care:  Eatin: Not applicable  Oral hygiene: 04: Supervision or touching  assistance  Toilet hygiene: 09: Not applicable  Shower/bathing self: 03: Partial/moderate assistance  Upper body dressin: Supervision or touching  assistance  Lower body dressin: Partial/moderate assistance  Putting on/taking off footwear: 09: Not applicable  Transfers:  Roll left and right: 09: Not applicable  Sit to lyin: Not applicable  Lying to sitting on side of bed: 09: Not applicable  Sit to stand: 04: Supervision or touching  assistance  Chair/bed to chair transfer: 04: Supervision or touching  assistance  Toilet transfer: 09: Not applicable  Mobility:  Walk 10 ft: 04: Supervision or touching  assistance  Walk 50 ft with two turns: 10: Not attempted due to environmental limitations  Walk 150ft: 88: Not attempted due to medical conditions or safety concerns    CURRENT GAP IN FUNCTION  Prior to Admission: Functional Status: Patient was independent with mobility/ambulation, transfers, ADL's, IADL's  Expected functional outcomes: It is expected that with skilled acute rehabilitation services the patient will progress to Independent for self care and Independent for mobility     Estimated length of stay: 10 to 14 days    Anticipated Post-Discharge Disposition/Treatment  Disposition: Return to previous home/apartment    Outpatient Services: Physical Therapy (PT) and Occupational Therapy (OT)    BARRIERS TO DISCHARGE  Lovenox, Weakness, Pain, Balance Difficulty, Fatigue, Home Accessibility, Caregiver Accessibility, Financial Resources, Equipment Needs and Resource Availability    INTERVENTIONS FOR DISCHARGE  Adaptive equipment, Patient/Family/Caregiver Education, Community Resources, Financial Assistance, Arrange DME needs, Medication Changes as per MD recommendations, Therapy exercises, Center of balance support  and Energy conservation education     REQUIRED THERAPY:  Patient will require PT and OT 90 minutes each per day, five days per week to achieve rehab goals  REQUIRED FUNCTIONAL AND MEDICAL MANAGEMENT FOR INPATIENT REHABILITATION:  Skin:  There are no pressure sores currently, right head incision RONEL, Pain Management: Overall pain is moderately controlled, Deep Vein Thrombosis (DVT) Prophylaxis:  Lovenox SQ, further IM management of additional medical conditions while on ARC, he needs PT/OT intervention, patient/family education and training, possible Neuropsych and Neurosurgery consults with any other needed consults prn, nursing medication review and management of bowel/bladder function  RECOMMENDED LEVEL OF CARE:   Patient is a 69year old male that presented to [place] [id number] on 3/19/2023 with complaints of progressively worsening right sided weakness over the past week  He has been stumbling at home and having difficulty ambulating  Also endorses SOB  CTA head/neck showed right frontal lobe cystic lesion with surrounding vasogenic edema most consistent with metastatic disease; RUL mass with right hilar adenopathy  MRI brain showed rim-enhancing 2 4cm centrally necrotic metastatic lesion in right frontal lobe with surrounding severe vasogenic edema, mass effect in right hemisphere with approximately 2 4mm right-left midline shift; small focus of anterior cortical frontal acute to subacute ischemia   Patient was transferred to [facility] for Neurosurgery evaluation  He was initiated on Decardon and Keppra  Neurology was consulted, with unable to rule out embolic infarct d/t hypercoagulable state related to malignancy  Patient was continued on ASA and initiated on statin  Neurosurgery was consulted, with no emergent surgical intervention warranted  Recommendations were made to continue on Decadron and Keppra, with holding ASA given possible need for eventual surgery  CT C/A/P showed 3 8 x 3 5cm RUL lung mass with associated overlying pleural tag and adjacent to pleural thickening; right hilar lymphadenopathy; small lower right paratracheal left paratracheal lymph node  Pulmonology was consulted, with recommendations to defer on biopsy of lung mass unless brain mass came back negative  Suspected patient with likely primary lung cancer with metastasis to mediastinal lymph nodes and brain  Medical Oncology was consulted, with patient to follow-up outpatient, and recommendations to be made pending pathology results  Full body scan was completed, which showed no metastatic disease  Repeat MRI brain with DTI on 3/22 showed no interval changes, with stable known brain mass with high suspicion for metastatic disease (lung)  ECHO showed an EF of 26%, grade 1 diastolic dysfunction, mild LA dilation and mild TVR  On 3/23, patient went to the OR with Neurosurgery for right frontal craniotomy image-guided for tumor EUA and melton insertion; EBL minimal  Frozen pathology consistent with metastatic adenocarcinoma  Per Neurosurgery, Decadron wean was initiated, and all AC/AP is to remain on hold for at least 2 weeks  He was cleared to start Lovenox SQ for DVT prophylaxis  Urology was consulted regarding difficult melton insertion intraop due to bladder neck contracture/urethral stricture and BPH  Recommendations were made to maintain melton catheter, with plans for voiding trial at rehab   Preliminary biopsy results showing non-small cell carcinoma, with molecular study pending  *** Prior to patient's admission, patient was fully Independent with ADLs and IADLs  Family provides transportation  He was Independent without use of AD for mobility  Currently, patient is Min assist with use of RW for gait and transfers, and Supervision for UB ADLs, Min/Mod assist for LB ADLs  Close medical management and PM&R management is recommended at this time while patient is on the [facility]  Inpatient acute rehab is recommended for patient to maximize overall strength and mobility upon discharge to home with support of family

## 2023-03-29 NOTE — PROGRESS NOTES
Outreach to patient, still inpatient at [facility]  Spoke with patient and wife who was present during phone call, introduced myself and my direct Teams phone number given  Discussed radiation referral received from Dr Corinna Blake  Patient states he's waiting to hear if he will be going to acute rehab  Radiation consult scheduled for 4/12/23 at the [facility] with Dr Beth Leiva  Office location, address, phone number given  Patient has no prior history of radiation therapy  Patient wife voiced concern to make sure we are in-network with their insurance, [facility]  Reassurance given that radiation billing department will be notified at appointment as soon as it gets scheduled  Instructed to call if they have questions

## 2023-03-29 NOTE — PLAN OF CARE
Problem: MOBILITY - ADULT  Goal: Maintain or return to baseline ADL function  Description: INTERVENTIONS:  -  Assess patient's ability to carry out ADLs; assess patient's baseline for ADL function and identify physical deficits which impact ability to perform ADLs (bathing, care of mouth/teeth, toileting, grooming, dressing, etc )  - Assess/evaluate cause of self-care deficits   - Assess range of motion  - Assess patient's mobility; develop plan if impaired  - Assess patient's need for assistive devices and provide as appropriate  - Encourage maximum independence but intervene and supervise when necessary  - Involve family in performance of ADLs  - Assess for home care needs following discharge   - Consider OT consult to assist with ADL evaluation and planning for discharge  - Provide patient education as appropriate  Outcome: Progressing  Goal: Maintains/Returns to pre admission functional level  Description: INTERVENTIONS:  - Perform BMAT or MOVE assessment daily    - Set and communicate daily mobility goal to care team and patient/family/caregiver  - Collaborate with rehabilitation services on mobility goals if consulted  - Perform Range of Motion 3 times a day  - Reposition patient every 2 hours    - Dangle patient 3 times a day  - Stand patient 3 times a day  - Ambulate patient 3 times a day  - Out of bed to chair 3 times a day   - Out of bed for meals 3 times a day  - Out of bed for toileting  - Record patient progress and toleration of activity level   Outcome: Progressing     Problem: PAIN - ADULT  Goal: Verbalizes/displays adequate comfort level or baseline comfort level  Description: Interventions:  - Encourage patient to monitor pain and request assistance  - Assess pain using appropriate pain scale  - Administer analgesics based on type and severity of pain and evaluate response  - Implement non-pharmacological measures as appropriate and evaluate response  - Consider cultural and social influences on pain and pain management  - Notify physician/advanced practitioner if interventions unsuccessful or patient reports new pain  Outcome: Progressing     Problem: INFECTION - ADULT  Goal: Absence or prevention of progression during hospitalization  Description: INTERVENTIONS:  - Assess and monitor for signs and symptoms of infection  - Monitor lab/diagnostic results  - Monitor all insertion sites, i e  indwelling lines, tubes, and drains  - Monitor endotracheal if appropriate and nasal secretions for changes in amount and color  - [place] appropriate cooling/warming therapies per order  - Administer medications as ordered  - Instruct and encourage patient and family to use good hand hygiene technique  - Identify and instruct in appropriate isolation precautions for identified infection/condition  Outcome: Progressing  Goal: Absence of fever/infection during neutropenic period  Description: INTERVENTIONS:  - Monitor WBC    Outcome: Progressing     Problem: SAFETY ADULT  Goal: Maintain or return to baseline ADL function  Description: INTERVENTIONS:  -  Assess patient's ability to carry out ADLs; assess patient's baseline for ADL function and identify physical deficits which impact ability to perform ADLs (bathing, care of mouth/teeth, toileting, grooming, dressing, etc )  - Assess/evaluate cause of self-care deficits   - Assess range of motion  - Assess patient's mobility; develop plan if impaired  - Assess patient's need for assistive devices and provide as appropriate  - Encourage maximum independence but intervene and supervise when necessary  - Involve family in performance of ADLs  - Assess for home care needs following discharge   - Consider OT consult to assist with ADL evaluation and planning for discharge  - Provide patient education as appropriate  Outcome: Progressing  Goal: Maintains/Returns to pre admission functional level  Description: INTERVENTIONS:  - Perform BMAT or MOVE assessment daily    - Set and communicate daily mobility goal to care team and patient/family/caregiver  - Collaborate with rehabilitation services on mobility goals if consulted  - Perform Range of Motion 3 times a day  - Reposition patient every 2 hours    - Dangle patient 3 times a day  - Stand patient 3 times a day  - Ambulate patient 3 times a day  - Out of bed to chair 3 times a day   - Out of bed for meals 3 times a day  - Out of bed for toileting  - Record patient progress and toleration of activity level   Outcome: Progressing  Goal: Patient will remain free of falls  Description: INTERVENTIONS:  - Educate patient/family on patient safety including physical limitations  - Instruct patient to call for assistance with activity   - Consult OT/PT to assist with strengthening/mobility   - Keep Call bell within reach  - Keep bed low and locked with side rails adjusted as appropriate  - Keep care items and personal belongings within reach  - Initiate and maintain comfort rounds  - Make Fall Risk Sign visible to staff  - Offer Toileting every 2 Hours, in advance of need  - Initiate/Maintain bed alarm  - Obtain necessary fall risk management equipment:   - Apply yellow socks and bracelet for high fall risk patients  - Consider moving patient to room near nurses station  Outcome: Progressing     Problem: NEUROSENSORY - ADULT  Goal: Achieves stable or improved neurological status  Description: INTERVENTIONS  - Monitor and report changes in neurological status  - Monitor vital signs such as temperature, blood pressure, glucose, and any other labs ordered   - Initiate measures to prevent increased intracranial pressure  - Monitor for seizure activity and implement precautions if appropriate      Outcome: Progressing  Goal: Remains free of injury related to seizures activity  Description: INTERVENTIONS  - Maintain airway, patient safety  and administer oxygen as ordered  - Monitor patient for seizure activity, document and report duration and description of seizure to physician/advanced practitioner  - If seizure occurs,  ensure patient safety during seizure  - Reorient patient post seizure  - Seizure pads on all 4 side rails  - Instruct patient/family to notify RN of any seizure activity including if an aura is experienced  - Instruct patient/family to call for assistance with activity based on nursing assessment  - Administer anti-seizure medications if ordered    Outcome: Progressing  Goal: Achieves maximal functionality and self care  Description: INTERVENTIONS  - Monitor swallowing and airway patency with patient fatigue and changes in neurological status  - Encourage and assist patient to increase activity and self care     - Encourage visually impaired, hearing impaired and aphasic patients to use assistive/communication devices  Outcome: Progressing     Problem: Prexisting or High Potential for Compromised Skin Integrity  Goal: Skin integrity is maintained or improved  Description: INTERVENTIONS:  - Identify patients at risk for skin breakdown  - Assess and monitor skin integrity  - Assess and monitor nutrition and hydration status  - Monitor labs   - Assess for incontinence   - Turn and reposition patient  - Assist with mobility/ambulation  - Relieve pressure over bony prominences  - Avoid friction and shearing  - Provide appropriate hygiene as needed including keeping skin clean and dry  - Evaluate need for skin moisturizer/barrier cream  - Collaborate with interdisciplinary team   - Patient/family teaching  - Consider wound care consult   Outcome: Progressing

## 2023-03-29 NOTE — CASE MANAGEMENT
Nadya Smith 48 received request for authorization from Care Manager  Authorization request for: Acute Rehab  Facility Name: [facility]  NPI: [id number]  Facility MD: Dr Jennifer Glover  NPI: [id number]  Authorization initiated by contacting insurance: [facility] Via: Fax & Availity  Clinicals submitted via: Fax & Availity  Due to inability to reach representative via phone, faxed [phone] form to [phone] and submitted authorization on Availity as back up per lead's suggestion

## 2023-03-29 NOTE — PLAN OF CARE
Problem: PHYSICAL THERAPY ADULT  Goal: Performs mobility at highest level of function for planned discharge setting  See evaluation for individualized goals  Description: Treatment/Interventions: Functional transfer training, LE strengthening/ROM, Elevations, Therapeutic exercise, Endurance training, Patient/family training, Equipment eval/education, Bed mobility, Gait training, Spoke to nursing, OT  Equipment Recommended: Abdias Mercado       See flowsheet documentation for full assessment, interventions and recommendations  Outcome: Progressing  Note: Prognosis: Good  Problem List: Decreased strength, Decreased endurance, Impaired balance, Decreased mobility, Decreased coordination  Assessment: Pt agreeable to participate in PT session  Pt performed functional mobility and therex as outlined above  Improved mobility since IE, Ax1 for all  L hip abductors weak and trendelenburg gait noted  Hip abductor therex initiated and VC to prevent hip collapse  Favors R LE for STS transfers-staggered stand with LLE closer to chair to promote LLE use  VC for L WS to promote WB  Educated on continued use of LUE especially with grasping/releasing objects as pt has difficulty letting go of objects including HR with stair negotiation  Pt left seated in chair with chair alarm, call bell, phone, and all personal needs within reach  Pt will continue to benefit from skilled acute care PT to further address their functional mobility limitations  D/C recommendations remain acute rehab as pt continues to function well below baseline  Barriers to Discharge: Inaccessible home environment, Decreased caregiver support     PT Discharge Recommendation: Post acute rehabilitation services    See flowsheet documentation for full assessment

## 2023-03-29 NOTE — PROGRESS NOTES
Progress Notes - Family Medicine Residency, Bebe Rosas 1955, 69 y o  male  MRN: [id number]    Unit/Bed#: [facility] 729-01 Encounter: [id number]  Primary Care Provider: Giorgio Leon DO      Admission Date: 3/20/2023 0013  Length of Stay: 9 days  Code Status:  Level 1 - Full Code  Consult:   IP CONSULT TO NEUROSURGERY  IP CONSULT TO NEUROLOGY  IP CONSULT TO PULMONOLOGY  IP CONSULT TO ONCOLOGY  IP CONSULT TO UROLOGY  IP CONSULT TO CASE MANAGEMENT    * Brain mass  Assessment & Plan  Assessment & Plan  POD#4 Right frontal CRANIOTOMY IMAGE-GUIDED FOR TUMOR (Dr Melanie Hui, 3/23/2023)  - R frontal brain mass w/ surrounding vasogenic edema   - presented to the [place] ED on 3/19 with progressively worsening LUE and LLE weakness  - 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, molecular study pending   - pt reports remote hx of prostate CA s/p prostatectomy several yrs ago and no further intervention or f/u since  - Neuro exam w/o focal deficit, LLE strenght 4 5/5, subjectively felt stronger than day before     Imaging:  - MRI brain 03/19: Rim-enhancing 2 4 cm centrally necrotic metastatic lesion in the right frontal lobe with surrounding severe vasogenic edema  Mass effect in the right hemisphere with approximately 2 4 mm right to left midline shift  - MRI brain 3/23: Expected postsurgical change from right parietal craniotomy and resection of cystic mass in the frontoparietal region  Stable vasogenic edema surrounding the resection cavity   Right to left midline shift of 2 mm    Plan:   · Continue to closely monitor neuro exam at this time   · frequent neuro checks q6hr   · Repeat STAT CTH with any acute decline in GCS > 2pts or more   · Maintain normotensive BP goals, SBP < 160   · Continue keppra 500mg q 12hrs x 1 week for seizure ppx  · Continue decadron wean as ordered q 48hours  · Hold all therapeutic doses of AC / AP therapy, continue to hold asa for at least 2 week  · DVT ppx: SCDs, "on Lovenox   · PT/OT when able  · Continue medical management and pain control   · Heme/onc and rad/onc consults  · CM following for rehab need       Melton catheter in place  Assessment & Plan  Patient with traumatic melton insert perioperatively  Urology aware   Urine clear in color   Will leave melton in place for now; plan to leave melton catheter for 7 days and follow up with urology outpatient for void trial and removal        Lung nodule  Assessment & Plan  Has a 20 pk-year smoking history, quit 30 years ago  Pt has no previous hx of lung cancer screening and has no pulm symptoms  CTA head and neck initially done on 3/19/2023 incidentally showed RUL mass with partially imaged right hilar adenopathy  CT chest/abd/pelvis with contrast 3/20/2023 shows \"3 8 x 3 5 cm right upper lobe lung mass with associated overlying pleural tag and adjacent to pleural thickening, this may be due to pleural reaction or mild pleural invasion  No contralateral lung nodules or mass  Right hilar lymphadenopathy  Small lower right paratracheal left paratracheal lymph node do not meet the criteria for pathologic enlargement on the bases of size or morphology  There is no CT evidence of for metastatic disease in the abdomen and pelvis  No lytic destructive lesion in the visualized bony skeleton  \" which is concerning for a primary lung tumor given recently diagnosed brain mass concerning for metastasis on MRI brain  Plan:   -Consulted pulmonology  -Consulted hem/onc for management of metastatic malignancy: Recommend full body bone scan which was completed on 3/22 and showed no metastatic disease  Patient is recommended to follow-up with heme-onc 2 weeks after discharge, possible PET-CT; they will continue following and waiting for biopsy results          Cerebral edema (HCC)  Assessment & Plan  - Per MRI 3/22; no increase of vasogenic edema   - Continue with Keppra and Steroid with taper per neurosurgery recommendations     CVA " "(cerebral vascular accident) [facility])  Assessment & Plan  Hx CVA 11-12 years ago with mild residual L sided deficits, baseline fully functional motor  - will hold aspirin 81 mg for 2 weeks per neurosurgery recommendations     Essential hypertension  Assessment & Plan  Systolic (23HUK), [place]:932 , Min:133 , Max:169     Initial 177/98 on transfer  Continue PTA medications amlodipine 10 mg and losartan 50 mg daily, antihypertensive med given on day of surgery     PRN Hydralazine             VTE Pharmacologic Prophylaxis: Enoxaparin (Lovenox)  VTE Mechanical Prophylaxis: sequential compression device  Diet: regular diet  Code Status: full code  Disposition: pending placement     Discussed with attending physician, Dr Marrero, and [facility] team     Subjective: Patient seen and examined bedside, he has no acute complaint  Doing well with PT and OT  Left lower extremity strength improved today almost 5 out of 5  Objective:     Vitals: Blood pressure 165/89, pulse 60, temperature 98 2 °F (36 8 °C), resp  rate 16, height 5' 9\" (1 753 m), weight 82 6 kg (182 lb 1 6 oz), SpO2 97 %  ,Body mass index is 26 89 kg/m²  Intake/Output Summary (Last 24 hours) at 3/29/2023 1122  Last data filed at 3/29/2023 0523  Gross per 24 hour   Intake 480 ml   Output 2450 ml   Net -1970 ml       Physical Exam  HENT:      Head: Normocephalic  Comments: Horizontal scalp wound, non discharging, with dry dark blood covering staples  Right Ear: External ear normal       Left Ear: External ear normal       Nose: No congestion  Mouth/Throat:      Pharynx: No oropharyngeal exudate  Eyes:      General: No scleral icterus  Cardiovascular:      Rate and Rhythm: Regular rhythm  Heart sounds: Normal heart sounds  No murmur heard  Pulmonary:      Effort: No respiratory distress  Breath sounds: Normal breath sounds  Abdominal:      General: Abdomen is flat  There is no distension  Palpations: Abdomen is soft   " Musculoskeletal:         General: No swelling or tenderness  Cervical back: No rigidity or tenderness  Right lower leg: No edema  Left lower leg: No edema  Skin:     Capillary Refill: Capillary refill takes less than 2 seconds  Coloration: Skin is not jaundiced  Neurological:      General: No focal deficit present  Mental Status: He is alert and oriented to person, place, and time  Mental status is at baseline  Sensory: No sensory deficit  Comments: KARENE 4 5/5 today    Psychiatric:         Mood and Affect: Mood normal          Invasive Devices     Peripheral Intravenous Line  Duration           Peripheral IV 03/26/23 Distal;Right;Ventral (anterior) Forearm 3 days          Drain  Duration           Urethral Catheter Latex; Other (Comment) 16 Fr  5 days                           Lab and other studies:  I have personally reviewed pertinent reports  No results displayed because visit has over 200 results            Recent Results (from the past 24 hour(s))   Fingerstick Glucose (POCT)    Collection Time: 03/28/23  9:19 PM   Result Value Ref Range    POC Glucose 128 65 - 140 mg/dl   CBC and differential    Collection Time: 03/29/23  5:19 AM   Result Value Ref Range    WBC 12 14 (H) 4 31 - 10 16 Thousand/uL    RBC 4 56 3 88 - 5 62 Million/uL    Hemoglobin 13 5 12 0 - 17 0 g/dL    Hematocrit 41 7 36 5 - 49 3 %    MCV 91 82 - 98 fL    MCH 29 6 26 8 - 34 3 pg    MCHC 32 4 31 4 - 37 4 g/dL    RDW 12 8 11 6 - 15 1 %    MPV 10 2 8 9 - 12 7 fL    Platelets 595 063 - 855 Thousands/uL   Basic metabolic panel    Collection Time: 03/29/23  5:19 AM   Result Value Ref Range    Sodium 137 135 - 147 mmol/L    Potassium 4 1 3 5 - 5 3 mmol/L    Chloride 108 96 - 108 mmol/L    CO2 25 21 - 32 mmol/L    ANION GAP 4 4 - 13 mmol/L    BUN 20 5 - 25 mg/dL    Creatinine 0 68 0 60 - 1 30 mg/dL    Glucose 113 65 - 140 mg/dL    Calcium 8 1 (L) 8 3 - 10 1 mg/dL    eGFR 97 ml/min/1 73sq m   Manual Differential(PHLEBS Do Not Order)    Collection Time: 03/29/23  5:19 AM   Result Value Ref Range    Segmented % 81 (H) 43 - 75 %    Lymphocytes % 5 (L) 14 - 44 %    Monocytes % 10 4 - 12 %    Eosinophils, % 0 0 - 6 %    Basophils % 0 0 - 1 %    Metamyelocytes% 1 0 - 1 %    Atypical Lymphocytes % 3 (H) <=0 %    Absolute Neutrophils 9 83 (H) 1 85 - 7 62 Thousand/uL    Lymphocytes Absolute 0 61 0 60 - 4 47 Thousand/uL    Monocytes Absolute 1 21 0 00 - 1 22 Thousand/uL    Eosinophils Absolute 0 00 0 00 - 0 40 Thousand/uL    Basophils Absolute 0 00 0 00 - 0 10 Thousand/uL    Total Counted      RBC Morphology Present     Polychromasia Present     Platelet Estimate Adequate Adequate   Fingerstick Glucose (POCT)    Collection Time: 03/29/23  6:37 AM   Result Value Ref Range    POC Glucose 103 65 - 140 mg/dl     Blood Culture: No results found for: BLOODCX,   Urinalysis:   Lab Results   Component Value Date    COLORU YELLOW 05/10/2018    CLARITYU CLEAR 05/10/2018    SPECGRAV 1 015 05/10/2018    PHUR 6 5 05/10/2018    LEUKOCYTESUR NEGATIVE 05/10/2018    NITRITE NEGATIVE 05/10/2018    PROTEINUA NEGATIVE 05/10/2018    GLUCOSEU NEGATIVE 05/10/2018    KETONESU NEGATIVE 05/10/2018    BILIRUBINUR NEGATIVE 05/10/2018    BLOODU NEGATIVE 05/10/2018   ,   Urine Culture: No results found for: URINECX,   Wound Culure: No results found for: WOUNDCULT      Imaging:  None  MRI brain w wo contrast    Result Date: 3/24/2023  1  Expected postsurgical change from right parietal craniotomy and resection of cystic mass in the frontoparietal region  2   Stable vasogenic edema surrounding the resection cavity  Right to left midline shift of 2 mm   Workstation performed: [id number]        Current Facility-Administered Medications   Medication Dose Route Frequency   • acetaminophen (TYLENOL) tablet 975 mg  975 mg Oral Q8H PRN   • amLODIPine (NORVASC) tablet 10 mg  10 mg Oral Daily   • atorvastatin (LIPITOR) tablet 40 mg  40 mg Oral After Dinner   • bisacodyl (DULCOLAX) rectal suppository 10 mg  10 mg Rectal Daily PRN   • dexamethasone (DECADRON) tablet 2 mg  2 mg Oral Q6H [facility]    Followed by   • [START ON 3/30/2023] dexamethasone (DECADRON) tablet 2 mg  2 mg Oral Q8H [facility]    Followed by   • [START ON 3/31/2023] dexamethasone (DECADRON) tablet 2 mg  2 mg Oral Q12H [facility]    Followed by   • [START ON 4/3/2023] dexamethasone (DECADRON) tablet 2 mg  2 mg Oral Q24H LEN   • docusate sodium (COLACE) capsule 100 mg  100 mg Oral BID   • enoxaparin (LOVENOX) subcutaneous injection 40 mg  40 mg Subcutaneous Daily   • hydrALAZINE (APRESOLINE) injection 10 mg  10 mg Intravenous Q4H PRN   • HYDROmorphone HCl (DILAUDID) injection 0 2 mg  0 2 mg Intravenous Q4H PRN   • levETIRAcetam (KEPPRA) tablet 500 mg  500 mg Oral Q12H [facility]   • losartan (COZAAR) tablet 50 mg  50 mg Oral Daily   • melatonin tablet 3 mg  3 mg Oral HS   • ondansetron (ZOFRAN) injection 4 mg  4 mg Intravenous Q6H PRN   • oxyCODONE (ROXICODONE) IR tablet 2 5 mg  2 5 mg Oral Q4H PRN   • oxyCODONE (ROXICODONE) IR tablet 5 mg  5 mg Oral Q4H PRN   • pantoprazole (PROTONIX) EC tablet 40 mg  40 mg Oral Early Morning   • senna (SENOKOT) tablet 8 6 mg  1 tablet Oral Daily             Radha Monique DO   Family Medicine

## 2023-03-29 NOTE — RESTORATIVE TECHNICIAN NOTE
Restorative Technician Note      Patient Name: Joy Medina     Note Type: Mobility  Patient Position Upon Consult: Bedside chair  Activity Performed: Ambulated; Dangled; Stood  Assistive Device: Roller walker; Other (Comment) (Assist x1 with chair follow  Assisted PT Jihan De Anda )  Education Provided: Yes  Patient Position at End of Consult: Bedside chair;  All needs within reach; Bed/Chair alarm activated    Roger CORNEJO, Restorative Technician, [employer]

## 2023-03-29 NOTE — PLAN OF CARE
Problem: OCCUPATIONAL THERAPY ADULT  Goal: Performs self-care activities at highest level of function for planned discharge setting  See evaluation for individualized goals  Description: Treatment Interventions: ADL retraining, Functional transfer training, Endurance training, Patient/family training, Equipment evaluation/education, Compensatory technique education, Continued evaluation, Energy conservation, Activityengagement          See flowsheet documentation for full assessment, interventions and recommendations  Outcome: Progressing  Note: Limitation: Decreased ADL status, Decreased UE strength, Decreased Safe judgement during ADL, Decreased endurance, Decreased fine motor control, Decreased self-care trans, Decreased high-level ADLs  Prognosis: Good  Assessment: Pt seen for participation in Occupational Therapy session with focus on activity tolerance, bed mob, functional transfers/mob, sitting balance and tolerance and standing tolerance and balance for pt engagement in UB/LB self-care tasks and energy conservation techniques  Pt cleared by FAYE/La for pt participated in OT session  Pt presented supine/HOB raised pt awake/alert and agreeable to participate in therapy following pt identifiers confirmed  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

## 2023-03-29 NOTE — OCCUPATIONAL THERAPY NOTE
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)   Intrinsic Gratification Lithography, art, bike riding   ADL   Where Assessed Sitting at [employer] Assistance 5  Supervision/Setup   Grooming Deficit Setup   UB Bathing Assistance 5  Supervision/Setup   UB [place]; Increased time to complete   LB Bathing Assistance 4  Minimal Assistance   UB Dressing Assistance 5  Supervision/Setup   UB Dressing Deficit Thread RUE; Thread LUE;Pull around back   LB Dressing Assistance 3  Moderate Assistance   LB Dressing Deficit Thread RLE into pants; Thread LLE into pants   Bed Mobility   Supine to Sit 4  Minimal assistance   Additional items Assist x 1; Increased time required; Bedrails;HOB elevated   Cognition   Overall Cognitive Status WFL   Arousal/Participation Alert; Responsive; Cooperative   Attention Attends with cues to redirect   Orientation Level Oriented X4   Memory Within functional limits   Following Commands Follows one step commands without difficulty   Activity Tolerance   Activity Tolerance Patient tolerated treatment well   Assessment   Assessment Pt seen for participation in Occupational Therapy session with focus on activity tolerance, bed mob, functional transfers/mob, sitting balance and tolerance and standing tolerance and balance for pt engagement in UB/LB self-care tasks and energy conservation techniques  Pt cleared by FAYE/La for pt participated in OT session   Pt presented supine/HOB raised pt awake/alert and agreeable to participate in therapy following pt identifiers confirmed  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  Pt required assist for functional transfers/mob and UB/LB self-care  2* deconditioning and decreased strength  He reported less strength in his L side than R  Pt was noted for slight loss of balance with static standing at bathroom sink  Therapy assist pt to correct balance  Pt will require post acute rehab service to continue to address these above noted pt deficit which currently impair pt ADL and functional mob     Plan   Treatment Interventions ADL retraining   Goal Expiration Date 04/07/23   OT Treatment Day 1   OT Frequency 3-5x/wk   Recommendation   OT Discharge Recommendation Post acute rehabilitation services   AM-PAC Daily Activity Inpatient   Lower Body Dressing 2   Bathing 2   Toileting 2   Upper Body Dressing 3   Grooming 3   Eating 4   Daily Activity Raw Score 16   Daily Activity Standardized Score (Calc for Raw Score >=11) 35 96   AM-PAC Applied Cognition Inpatient   Following a Speech/Presentation 4   Understanding Ordinary Conversation 4   Taking Medications 4   Remembering Where Things Are Placed or Put Away 4   Remembering List of 4-5 Errands 4   Taking Care of Complicated Tasks 3   Applied Cognition Raw Score 23   Applied Cognition Standardized Score 53 08   Barthel Index   Feeding 10   Bathing 0   Grooming Score 0   Dressing Score 5   Bladder Score 0   Bowels Score 10   Toilet Use Score 5   Transfers (Bed/Chair) Score 5   Mobility (Level Surface) Score 0   Stairs Score 0   Barthel Index Score 35   Modified Lyman Scale   Modified [place] Scale 2     Niraj POST/HARRY

## 2023-03-30 ENCOUNTER — TRANSITIONAL CARE MANAGEMENT (OUTPATIENT)
Dept: FAMILY MEDICINE CLINIC | Facility: CLINIC | Age: 68
End: 2023-03-30

## 2023-03-30 ENCOUNTER — HOSPITAL ENCOUNTER (INPATIENT)
Facility: HOSPITAL | Age: 68
LOS: 12 days | Discharge: HOME | End: 2023-04-11
Attending: FAMILY MEDICINE | Admitting: FAMILY MEDICINE

## 2023-03-30 VITALS
OXYGEN SATURATION: 95 % | DIASTOLIC BLOOD PRESSURE: 81 MMHG | WEIGHT: 177.47 LBS | TEMPERATURE: 98.3 F | HEART RATE: 63 BPM | SYSTOLIC BLOOD PRESSURE: 152 MMHG | RESPIRATION RATE: 18 BRPM | BODY MASS INDEX: 26.29 KG/M2 | HEIGHT: 69 IN

## 2023-03-30 DIAGNOSIS — G93.89 BRAIN MASS: Primary | ICD-10-CM

## 2023-03-30 DIAGNOSIS — E78.00 HYPERCHOLESTEROLEMIA: ICD-10-CM

## 2023-03-30 LAB
GLUCOSE SERPL-MCNC: 159 MG/DL (ref 65–140)
GLUCOSE SERPL-MCNC: 211 MG/DL (ref 65–140)
GLUCOSE SERPL-MCNC: 75 MG/DL (ref 65–140)
GLUCOSE SERPL-MCNC: 91 MG/DL (ref 65–140)

## 2023-03-30 RX ORDER — AMLODIPINE BESYLATE 10 MG/1
10 TABLET ORAL DAILY
Status: DISCONTINUED | OUTPATIENT
Start: 2023-03-31 | End: 2023-04-11 | Stop reason: HOSPADM

## 2023-03-30 RX ORDER — ACETAMINOPHEN 325 MG/1
975 TABLET ORAL EVERY 8 HOURS PRN
Status: DISCONTINUED | OUTPATIENT
Start: 2023-03-30 | End: 2023-04-11 | Stop reason: HOSPADM

## 2023-03-30 RX ORDER — ENOXAPARIN SODIUM 100 MG/ML
40 INJECTION SUBCUTANEOUS DAILY
Status: DISCONTINUED | OUTPATIENT
Start: 2023-03-31 | End: 2023-04-11 | Stop reason: HOSPADM

## 2023-03-30 RX ORDER — ATORVASTATIN CALCIUM 40 MG/1
40 TABLET, FILM COATED ORAL
Status: DISCONTINUED | OUTPATIENT
Start: 2023-03-30 | End: 2023-04-11 | Stop reason: HOSPADM

## 2023-03-30 RX ORDER — DEXAMETHASONE 2 MG/1
2 TABLET ORAL
Qty: 2 TABLET | Refills: 0 | Status: ON HOLD | OUTPATIENT
Start: 2023-04-03 | End: 2023-04-05

## 2023-03-30 RX ORDER — ATORVASTATIN CALCIUM 40 MG/1
40 TABLET, FILM COATED ORAL
Qty: 30 TABLET | Refills: 1 | Status: ON HOLD | OUTPATIENT
Start: 2023-03-30 | End: 2023-05-29

## 2023-03-30 RX ORDER — PANTOPRAZOLE SODIUM 40 MG/1
40 TABLET, DELAYED RELEASE ORAL
Status: DISCONTINUED | OUTPATIENT
Start: 2023-03-31 | End: 2023-04-11 | Stop reason: HOSPADM

## 2023-03-30 RX ORDER — DEXAMETHASONE 2 MG/1
2 TABLET ORAL EVERY 8 HOURS SCHEDULED
Qty: 4 TABLET | Refills: 0 | Status: ON HOLD | OUTPATIENT
Start: 2023-03-30 | End: 2023-04-01

## 2023-03-30 RX ORDER — DEXAMETHASONE 0.5 MG/1
2 TABLET ORAL EVERY 12 HOURS SCHEDULED
Status: COMPLETED | OUTPATIENT
Start: 2023-03-31 | End: 2023-04-02

## 2023-03-30 RX ORDER — BISACODYL 10 MG
10 SUPPOSITORY, RECTAL RECTAL DAILY PRN
Status: DISCONTINUED | OUTPATIENT
Start: 2023-03-30 | End: 2023-04-07

## 2023-03-30 RX ORDER — DOCUSATE SODIUM 100 MG/1
100 CAPSULE, LIQUID FILLED ORAL 2 TIMES DAILY
Status: DISCONTINUED | OUTPATIENT
Start: 2023-03-30 | End: 2023-04-05

## 2023-03-30 RX ORDER — PANTOPRAZOLE SODIUM 40 MG/1
40 TABLET, DELAYED RELEASE ORAL
Qty: 60 TABLET | Refills: 0 | Status: ON HOLD
Start: 2023-03-31

## 2023-03-30 RX ORDER — LOSARTAN POTASSIUM 50 MG/1
50 TABLET ORAL DAILY
Status: DISCONTINUED | OUTPATIENT
Start: 2023-03-31 | End: 2023-04-11 | Stop reason: HOSPADM

## 2023-03-30 RX ORDER — OXYCODONE HYDROCHLORIDE 5 MG/1
5 TABLET ORAL EVERY 4 HOURS PRN
Status: DISCONTINUED | OUTPATIENT
Start: 2023-03-30 | End: 2023-04-11 | Stop reason: HOSPADM

## 2023-03-30 RX ORDER — ONDANSETRON 4 MG/1
4 TABLET, ORALLY DISINTEGRATING ORAL EVERY 6 HOURS PRN
Status: DISCONTINUED | OUTPATIENT
Start: 2023-03-30 | End: 2023-04-11 | Stop reason: HOSPADM

## 2023-03-30 RX ORDER — SENNOSIDES 8.6 MG
2 TABLET ORAL DAILY
Status: DISCONTINUED | OUTPATIENT
Start: 2023-03-30 | End: 2023-04-05

## 2023-03-30 RX ORDER — DEXAMETHASONE 2 MG/1
2 TABLET ORAL EVERY 12 HOURS SCHEDULED
Qty: 4 TABLET | Refills: 0 | Status: ON HOLD | OUTPATIENT
Start: 2023-03-31 | End: 2023-04-02

## 2023-03-30 RX ORDER — DEXAMETHASONE 0.5 MG/1
2 TABLET ORAL EVERY 8 HOURS SCHEDULED
Status: COMPLETED | OUTPATIENT
Start: 2023-03-30 | End: 2023-03-31

## 2023-03-30 RX ORDER — DEXAMETHASONE 0.5 MG/1
2 TABLET ORAL
Status: COMPLETED | OUTPATIENT
Start: 2023-04-03 | End: 2023-04-04

## 2023-03-30 RX ORDER — LANOLIN ALCOHOL/MO/W.PET/CERES
3 CREAM (GRAM) TOPICAL
Status: DISCONTINUED | OUTPATIENT
Start: 2023-03-30 | End: 2023-04-11 | Stop reason: HOSPADM

## 2023-03-30 RX ADMIN — DEXAMETHASONE 2 MG: 0.5 TABLET ORAL at 21:32

## 2023-03-30 RX ADMIN — DEXAMETHASONE 2 MG: 0.5 TABLET ORAL at 15:36

## 2023-03-30 RX ADMIN — ATORVASTATIN CALCIUM 40 MG: 40 TABLET, FILM COATED ORAL at 18:05

## 2023-03-30 RX ADMIN — DOCUSATE SODIUM 100 MG: 100 CAPSULE, LIQUID FILLED ORAL at 09:36

## 2023-03-30 RX ADMIN — DOCUSATE SODIUM 100 MG: 100 CAPSULE, LIQUID FILLED ORAL at 18:05

## 2023-03-30 RX ADMIN — LEVETIRACETAM 500 MG: 500 TABLET, FILM COATED ORAL at 09:36

## 2023-03-30 RX ADMIN — AMLODIPINE BESYLATE 10 MG: 10 TABLET ORAL at 09:36

## 2023-03-30 RX ADMIN — LOSARTAN POTASSIUM 50 MG: 50 TABLET, FILM COATED ORAL at 09:36

## 2023-03-30 RX ADMIN — ENOXAPARIN SODIUM 40 MG: 40 INJECTION SUBCUTANEOUS at 09:36

## 2023-03-30 RX ADMIN — Medication 3 MG: at 21:32

## 2023-03-30 RX ADMIN — PANTOPRAZOLE SODIUM 40 MG: 40 TABLET, DELAYED RELEASE ORAL at 05:28

## 2023-03-30 RX ADMIN — SENNOSIDES 8.6 MG: 8.6 TABLET, FILM COATED ORAL at 09:36

## 2023-03-30 RX ADMIN — ACETAMINOPHEN 975 MG: 325 TABLET ORAL at 00:21

## 2023-03-30 RX ADMIN — DEXAMETHASONE 2 MG: 2 TABLET ORAL at 05:29

## 2023-03-30 NOTE — RESTORATIVE TECHNICIAN NOTE
Restorative Technician Note      Patient Name: Sara Ha     Note Type: Mobility  Patient Position Upon Consult: Bedside chair  Activity Performed: Ambulated; Dangled; Stood  Assistive Device: Roller walker  Education Provided: Yes  Patient Position at [employer] of Consult: Bedside chair;  All needs within reach; Bed/Chair alarm activated    Kaila CORNEJO, Restorative Technician, [employer]

## 2023-03-30 NOTE — NURSING NOTE
Patient discharged in wheelchair to [address] [place] [address] with all belongings and accompanied by wife  Per attending, pt to be discharged with melton and follow-up outpatient with urology   Report called into [facility] FAYE Arce

## 2023-03-30 NOTE — PROGRESS NOTES
PHYSICAL MEDICINE AND REHABILITATION   PREADMISSION ASSESSMENT     Projected [facility] Diagnoses:  Impairment of mobility, safety and Activities of Daily Living (ADLs) due to Brain Dysfunction:  02 1  Non-Traumatic  Etiologic Dx: Right Frontal Brain Metastasis  Date of Onset: 3/19/2023   Date of surgery: 3/23/2023 Right - Right frontal CRANIOTOMY IMAGE-GUIDED FOR TUMOR EUA  DEL CASTILLO INSERTION    PATIENT INFORMATION  Name: Daneil Severin Phone #: [phone] (home)   Address: [address] [address]  YOB: 1955 Age: 69 y o  SS#   Marital Status: /Civil Union  Ethnicity:   Employment Status: retired  Extended Emergency Contact Information  Primary Emergency Contact: [address] [place] [address]   Mobile Phone: [phone]  Relation: Spouse  Advance Directive: Level 1 Full Code - unknown advanced directive    INSURANCE/COVERAGE:     Primary Payor: BLUE CROSS / Plan: [employer] - /l?k â€¢ splik/[facility]  / Product Type: Blue Fee for Service / [employer] Secondary Payer: Medicare/Medicare Part A Only   Payer Contact: Mary LOWERY   Payer Contact:n/a   Contact Phone: [phone] ([phone] Contact Phone:n/a     Authorization #: [id number]  Coverage Dates:3/30/23 - 4/5/23  LCD: 4/5/23 ( Review due 4/5/23 )    MEDICARE #: [id number]  Medicare Days: N/A  Medical Record #: [id number]     Confirmed benefits for inpatient acute rehab with Tequila Kamara, , as follows: $0 deductible, $8500 out of pocket maximum  Also confirmed with Grady CHIRINOS, , that [facility] is in network with [facility] plan since we are in par with our local   Nessa Mcarthur 150      REFERRAL SOURCE:   Referring provider: Talib Green MD  Referring facility: [address] [place] [address]  Room: [facility] 729/[facility] 729-01  PCP: Martin Keene DO PCP phone number: [phone]    MEDICAL INFORMATION  HPI: Khalif Rahman is a 69year old male that presented to [address] [place] [address] campus on 3/19/2023 with complaints of progressively worsening right sided weakness over the past week  He has been stumbling at home and having difficulty ambulating  Also endorses SOB  CTA head/neck showed right frontal lobe cystic lesion with surrounding vasogenic edema most consistent with metastatic disease; RUL mass with right hilar adenopathy  MRI brain showed rim-enhancing 2 4cm centrally necrotic metastatic lesion in right frontal lobe with surrounding severe vasogenic edema, mass effect in right hemisphere with approximately 2 4mm right-left midline shift; small focus of anterior cortical frontal acute to subacute ischemia  Patient was transferred to [facility] for Neurosurgery evaluation  He was initiated on Decardon and Keppra  Neurology was consulted, with unable to rule out embolic infarct d/t hypercoagulable state related to malignancy  Patient was continued on ASA and initiated on statin  Neurosurgery was consulted, with no emergent surgical intervention warranted  Recommendations were made to continue on Decadron and Keppra, with holding ASA given possible need for eventual surgery  CT C/A/P showed 3 8 x 3 5cm RUL lung mass with associated overlying pleural tag and adjacent to pleural thickening; right hilar lymphadenopathy; small lower right paratracheal left paratracheal lymph node  Pulmonology was consulted, with recommendations to defer on biopsy of lung mass unless brain mass came back negative  Suspected patient with likely primary lung cancer with metastasis to mediastinal lymph nodes and brain  Medical Oncology was consulted, with patient to follow-up outpatient, and recommendations to be made pending pathology results  Full body scan was completed, which showed no metastatic disease  Repeat MRI brain with DTI on 3/22 showed no interval changes, with stable known brain mass with high suspicion for metastatic disease (lung)   ECHO showed an EF of 06%, grade 1 diastolic dysfunction, mild LA dilation and mild TVR  On 3/23, patient went to the OR with Neurosurgery for right frontal craniotomy image-guided for tumor EUA and melton insertion; EBL minimal  Frozen pathology consistent with metastatic adenocarcinoma  Per Neurosurgery, Decadron wean was initiated, and all AC/AP is to remain on hold for at least 2 weeks  He was cleared to start Lovenox SQ for DVT prophylaxis  Urology was consulted regarding difficult melton insertion intraop due to bladder neck contracture/urethral stricture and BPH  Recommendations were made to maintain melton catheter, with plans for voiding trial at rehab  Preliminary biopsy results showing non-small cell carcinoma, with molecular study pending  Patient is overall hemodynamically stable and medically cleared for discharge to [facility]  PT/OT therapies were consulted, as well as patient's case reviewed with [facility] physician, and they are recommending patient for inpatient Acute Rehab  He has demonstrated that he can tolerate and participate in 3 hours of therapy per day  Received both Pfizer vaccines; COVID test resulted negative on 3/29/2023  Past Medical History:   Past Surgical History:    Allergies:     Past Medical History:   Diagnosis Date   • Hypertension    • Prostate cancer ([address] ) 2001   • Rectal bleeding    • Stroke [facility])     2011      Past Surgical History:   Procedure Laterality Date   • COLONOSCOPY     • CRANIOTOMY Right 3/23/2023    Procedure: Right frontal CRANIOTOMY IMAGE-GUIDED FOR TUMOR;  Surgeon: Bolivar Vallejo MD;  Location: [facility];  Service: Neurosurgery   • AK CYSTOURETHROSCOPY N/A 3/23/2023    Procedure: EUA, MELTON INSERTION;  Surgeon: Alia Hendricks MD;  Location: [facility];  Service: Urology   • PROSTATECTOMY  2001   • TONSILLECTOMY       No Known Allergies      Medical/functional conditions requiring inpatient rehabilitation: right frontal brain metastasis s/p right frontal craniotomy for tumor resection, right sided weakness, RUL mass, frontal infarct, difficult catheter insertion s/p melton, cerebral edema, BPH, impaired mobility and self care    Risk for medical/clinical complications: risk for falls, risk for seizures, risk for uncontrolled pain, risk for skin breakdown, risk for DVT/PE, risk for infection, risk for hypo/hypertensive episodes    Comorbidities/Surgeries in the last 100 days: 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, HTN, prostate cancer s/p prostatectomy, 3/23/2023 Right - Right frontal CRANIOTOMY IMAGE-GUIDED FOR TUMOR EUA  MELTON INSERTION    CURRENT VITAL SIGNS:   Temp:  [97 8 °F (36 6 °C)-98 3 °F (36 8 °C)] 98 3 °F (36 8 °C)  HR:  [56-75] 63  Resp:  [17-18] 18  BP: (126-163)/(73-90) 152/81   Intake/Output Summary (Last 24 hours) at 3/30/2023 0948  Last data filed at 3/30/2023 0817  Gross per 24 hour   Intake 300 ml   Output 3550 ml   Net -3250 ml        LABORATORY RESULTS:      Lab Results   Component Value Date    HGB 13 5 03/29/2023    HGB 14 6 05/10/2018    HCT 41 7 03/29/2023    HCT 43 6 05/10/2018    WBC 12 14 (H) 03/29/2023    WBC 7 1 05/10/2018     Lab Results   Component Value Date    BUN 20 03/29/2023    BUN 17 05/10/2018     05/10/2018    K 4 1 03/29/2023    K 3 6 05/10/2018     03/29/2023     05/10/2018    CREATININE 0 68 03/29/2023    CREATININE 0 80 05/10/2018     Lab Results   Component Value Date    PROTIME 14 0 03/24/2023    INR 1 06 03/24/2023        DIAGNOSTIC STUDIES:  MRI brain w wo contrast    Result Date: 3/24/2023  Impression: 1  Expected postsurgical change from right parietal craniotomy and resection of cystic mass in the frontoparietal region  2   Stable vasogenic edema surrounding the resection cavity  Right to left midline shift of 2 mm  Workstation performed: [id number]     NM bone scan whole body    Result Date: 3/22/2023  Impression: 1  No focal tracer activity characteristic of osseous metastatic disease   Workstation performed: [id number]     CT chest abdomen pelvis w contrast    Result Date: 3/21/2023  Impression: 3 8 x 3 5 cm right upper lobe lung mass with associated overlying pleural tag and adjacent to pleural thickening, this may be due to pleural reaction or mild pleural invasion No contralateral lung nodules or mass Right hilar lymphadenopathy  Small lower right paratracheal left paratracheal lymph node do not meet the criteria for pathologic enlargement on the bases of size or morphology There is no CT evidence of for metastatic disease in the abdomen and pelvis No lytic destructive lesion in the visualized bony skeleton The study was marked in EPIC for significant notification  Workstation performed: [id number]     MRI Brain BT w wo Contrast    Result Date: 3/22/2023  Impression: Unchanged 2 5 cm right parasagittal frontal lobe vertex mass with central necrosis and marked perilesional vasogenic edema unchanged  Favor metastatic disease (given lung mass) over primary high-grade glioma  Unchanged small subacute infarct in right frontal lobe  Unchanged 0 2 cm leftward midline shift  No new acute intracranial abnormality  Workstation performed: [id number]       PRECAUTIONS/SPECIAL NEEDS:  Tobacco:   Social History     Tobacco Use   Smoking Status Former   • Packs/day: 1 00   • Years: 15 00   • Pack years: 15 00   • Types: Cigarettes   • Passive exposure: Never   Smokeless Tobacco Never   Tobacco Comments    i quit when i was 28  not sure of exact dates   , Alcohol:    Social History     Substance and Sexual Activity   Alcohol Use Yes   • Alcohol/week: 6 0 standard drinks   • Types: 6 Cans of beer per week    Comment: does not drink every day   , Anticoagulation:  Lovenox SQ, Edema Management, Safety Concerns, Pain Management, Aspiration Risk/Precautions, Visually Impaired, Language Preference: English, Seizure Precautions and Fall Precautions      MEDICATIONS:     Current Facility-Administered Medications:   •  acetaminophen (TYLENOL) tablet 975 mg, 975 mg, Oral, Q8H PRN, Kalie Butler DO, 975 mg at 03/30/23 0021  •  amLODIPine (NORVASC) tablet 10 mg, 10 mg, Oral, Daily, Kalie Butler DO, 10 mg at 03/30/23 6954  •  atorvastatin (LIPITOR) tablet 40 mg, 40 mg, Oral, After Dinner, Chang De Jesus DO, 40 mg at 03/29/23 1714  •  bisacodyl (DULCOLAX) rectal suppository 10 mg, 10 mg, Rectal, Daily PRN, Chang De Jesus DO  •  [COMPLETED] dexamethasone (DECADRON) tablet 4 mg, 4 mg, Oral, Q6H LEN, 4 mg at 03/25/23 1138 **FOLLOWED BY** [COMPLETED] dexamethasone (DECADRON) tablet 4 mg, 4 mg, Oral, Q8H [address], 4 mg at 03/27/23 1300 **FOLLOWED BY** [COMPLETED] dexamethasone (DECADRON) tablet 2 mg, 2 mg, Oral, Q6H [address], 2 mg at 03/29/23 1713 **FOLLOWED BY** dexamethasone (DECADRON) tablet 2 mg, 2 mg, Oral, Q8H [address], 2 mg at 03/30/23 0529 **FOLLOWED BY** [START ON 3/31/2023] dexamethasone (DECADRON) tablet 2 mg, 2 mg, Oral, Q12H [address] **FOLLOWED BY** [START ON 4/3/2023] dexamethasone (DECADRON) tablet 2 mg, 2 mg, Oral, Q24H [address], Kalie Butler DO  •  docusate sodium (COLACE) capsule 100 mg, 100 mg, Oral, BID, Kalie Butler DO, 100 mg at 03/30/23 6701  •  enoxaparin (LOVENOX) subcutaneous injection 40 mg, 40 mg, Subcutaneous, Daily, Crystal Ermelinda Klinefelter, MD, 40 mg at 03/30/23 1526  •  hydrALAZINE (APRESOLINE) injection 10 mg, 10 mg, Intravenous, Q4H PRN, Chang De Jesus DO, 10 mg at 03/27/23 2213  •  HYDROmorphone HCl (DILAUDID) injection 0 2 mg, 0 2 mg, Intravenous, Q4H PRN, Chang De Jesus DO, 0 2 mg at 03/26/23 0155  •  losartan (COZAAR) tablet 50 mg, 50 mg, Oral, Daily, Kalie Butler DO, 50 mg at 03/30/23 5645  •  melatonin tablet 3 mg, 3 mg, Oral, HS, Crystal Ermelinda Klinefelter, MD, 3 mg at 03/29/23 2114  •  ondansetron (ZOFRAN) injection 4 mg, 4 mg, Intravenous, Q6H PRN, Chang De Jesus DO  •  oxyCODONE (ROXICODONE) IR tablet 2 5 mg, 2 5 mg, Oral, Q4H PRN, Chang De Jesus DO, 2 5 mg at 03/25/23 2128  •  oxyCODONE (ROXICODONE) IR tablet 5 mg, 5 mg, Oral, Q4H PRN, Chang De Jesus DO, 5 mg at 03/25/23 0005  • pantoprazole (PROTONIX) EC tablet 40 mg, 40 mg, Oral, Early Morning, Kalie Butler DO, 40 mg at 03/30/23 2341  •  senna (SENOKOT) tablet 8 6 mg, 1 tablet, Oral, Daily, Kalie Butler DO, 8 6 mg at 03/30/23 1688    SKIN INTEGRITY:   Incision: healing well, no significant drainage, no dehiscence, no significant erythema, right head incision RONEL    PRIOR LEVEL OF FUNCTION:  He lives in a(n) single family home  Amaris Carmen is  and lives with their spouse  Self Care: Independent, Indoor Mobility: Independent, Stairs (in/outdoor): Independent and Cognition: Independent  Prior to patient's admission, patient was fully Independent with ADLs and IADLs  Family provides transportation  He was Independent without use of AD for mobility  FALLS IN THE LAST 6 MONTHS: 1 to 4    HOME ENVIRONMENT:  The living area: bedroom on 2nd floor and bathroom on 2nd floor, 1/2 bath on main level  There are 4-5 steps to enter the home with full flight of stairs to 2nd floor of home  The patient will have 24 hour supervision/physical assistance available upon discharge  Patient's spouse works in [place] and is gone every other week - patient is home alone often  However, spouse is able to work from home if needed      PREVIOUS DME:  Equipment in home (previous DME): Single Point Cane    FUNCTIONAL STATUS:  Physical Therapy Occupational Therapy Speech Therapy   3/29/2023, per PT    Cognition   Overall Cognitive Status [facility]   Arousal/Participation Cooperative   Attention Attends with cues to redirect   Orientation Level Oriented X4   Memory Within functional limits   Following Commands Follows multistep commands without difficulty   Comments very pleasant to work with   Subjective   Subjective agreeable to participate   Bed Mobility   Supine to Sit Unable to assess   Sit to Supine Unable to assess   Additional Comments presented with OT and returned seated in chair with alarm upon conclusion   Transfers   Sit to Stand 4  Minimal assistance Additional items Assist x 1; Increased time required;Verbal cues   Stand to Sit 4  Minimal assistance   Additional items Assist x 1; Increased time required;Verbal cues   Stand pivot 4  Minimal assistance   Additional items Assist x 1; Increased time required;Verbal cues   Additional Comments c RW; x4 STS +10 consecutive at anterior HR with b/l UE support and feet staggered to promote LLE WB   Ambulation/Elevation   Gait pattern L Hemiparesis; Improper Weight shift;Decreased foot clearance;Decreased L stance;[place]; Short stride; Excessively slow  (L hip abductor weakness)   Gait Assistance 4  Minimal assist   Additional items Assist x 1;Verbal cues   Assistive Device Rolling walker   Distance 60'x2  (seated rest bw)   Stair Management Assistance 3  Moderate assist   Additional items Assist x 1;Verbal cues; Increased time required   Stair Management Technique One rail R;Two rails; Step to pattern; Foreward;Nonreciprocal  (R HR up, b/l down)   Number of Stairs 7   Ambulation/Elevation Additional Comments VC to move LUE down on HR   Balance   Static Sitting Fair +   Dynamic Sitting Fair   Static Standing Fair -   Dynamic Standing Poor +   Ambulatory Poor +   Endurance Deficit   Endurance Deficit Yes   Endurance Deficit Description fatigue   Activity Tolerance   Activity Tolerance Patient tolerated treatment well   Medical Staff Made Aware naz [facility] tech   Nurse Made Aware yes-cleared to mobilize   Exercises   Hip Abduction Standing;10 reps;AROM; Bilateral  (at HR with b/l UE support and minAx1; educated on sidelying clamshells)   Assessment   Prognosis Good   Problem List Decreased strength;Decreased endurance; Impaired balance;Decreased mobility; Decreased coordination   Assessment Pt agreeable to participate in PT session  Pt performed functional mobility and therex as outlined above  Improved mobility since IE, Ax1 for all  L hip abductors weak and trendelenburg gait noted   Hip abductor therex initiated and VC to prevent hip collapse  Favors R LE for STS transfers-staggered stand with LLE closer to chair to promote LLE use  VC for L WS to promote WB  Educated on continued use of LUE especially with grasping/releasing objects as pt has difficulty letting go of objects including HR with stair negotiation  Pt left seated in chair with chair alarm, call bell, phone, and all personal needs within reach  Pt will continue to benefit from skilled acute care PT to further address their functional mobility limitations  D/C recommendations remain acute rehab as pt continues to function well below baseline  3/29/2023, per POST    ADL   Where Assessed Sitting at sink   Grooming Assistance 5  Supervision/Setup   Grooming Deficit Setup   UB Bathing Assistance 5  Supervision/Setup   UB Bathing Deficit Setup; Increased time to complete   LB Bathing Assistance 4  Minimal Assistance   UB Dressing Assistance 5  Supervision/Setup   UB Dressing Deficit Thread RUE; Thread LUE;Pull around back   LB Dressing Assistance 3  Moderate Assistance   LB Dressing Deficit Thread RLE into pants; Thread LLE into pants   Bed Mobility   Supine to Sit 4  Minimal assistance   Additional items Assist x 1; Increased time required; Bedrails;HOB elevated   Cognition   Overall Cognitive Status WFL   Arousal/Participation Alert; Responsive; Cooperative   Attention Attends with cues to redirect   Orientation Level Oriented X4   Memory Within functional limits   Following Commands Follows one step commands without difficulty   Activity Tolerance   Activity Tolerance Patient tolerated treatment well   Assessment   Assessment Pt seen for participation in Occupational Therapy session with focus on activity tolerance, bed mob, functional transfers/mob, sitting balance and tolerance and standing tolerance and balance for pt engagement in UB/LB self-care tasks and energy conservation techniques  Pt cleared by FAYE/La for pt participated in OT session   Pt presented supine/HOB raised pt awake/alert and agreeable to participate in therapy following pt identifiers confirmed  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  Pt required assist for functional transfers/mob and UB/LB self-care  2* deconditioning and decreased strength  He reported less strength in his L side than R  Pt was noted for slight loss of balance with static standing at bathroom sink  Therapy assist pt to correct balance  Pt will require post acute rehab service to continue to address these above noted pt deficit which currently impair pt ADL and functional mob  N/A     CARE SCORES:  Self Care:  Eatin: Setup or clean-up assistance  Oral hygiene: 04: Supervision or touching  assistance  Toilet hygiene: 04: Supervision or touching  assistance  Shower/bathing self: 03: Partial/moderate assistance  Upper body dressin: Supervision or touching  assistance  Lower body dressin: Partial/moderate assistance  Putting on/taking off footwear: 03: Partial/moderate assistance  Transfers:  Roll left and right: 04: Supervision or touching  assistance  Sit to lyin: Supervision or touching  assistance  Lying to sitting on side of bed: 04: Supervision or touching  assistance  Sit to stand: 04: Supervision or touching  assistance  Chair/bed to chair transfer: 04: Supervision or touching  assistance  Toilet transfer: 03: Partial/moderate assistance  Mobility:  Walk 10 ft: 04: Supervision or touching  assistance  Walk 50 ft with two turns: 10: Not attempted due to environmental limitations  Walk 150ft: 88: Not attempted due to medical conditions or safety concerns    CURRENT GAP IN FUNCTION  Prior to Admission: Functional Status: Patient was independent with mobility/ambulation, transfers, ADL's, IADL's  Expected functional outcomes:  It is expected that with skilled acute rehabilitation services the patient will progress to Independent for self care and Independent for mobility     Estimated length of stay: 10 to 14 days    Anticipated Post-Discharge Disposition/Treatment  Disposition: Return to previous home/apartment  Outpatient Services: Physical Therapy (PT) and Occupational Therapy (OT)    BARRIERS TO DISCHARGE  Lovenox, Weakness, Pain, Balance Difficulty, Fatigue, Home Accessibility, Caregiver Accessibility, Financial Resources, Equipment Needs and Resource Availability    INTERVENTIONS FOR DISCHARGE  Adaptive equipment, Patient/Family/Caregiver Education, Community Resources, Financial Assistance, Arrange DME needs, Medication Changes as per MD recommendations, Therapy exercises, Center of balance support  and Energy conservation education     REQUIRED THERAPY:  Patient will require PT and OT 90 minutes each per day, five days per week to achieve rehab goals  REQUIRED FUNCTIONAL AND MEDICAL MANAGEMENT FOR INPATIENT REHABILITATION:  Skin:  There are no pressure sores currently, right head incision RONEL, Pain Management: Overall pain is moderately controlled, Deep Vein Thrombosis (DVT) Prophylaxis:  Lovenox SQ, further IM management of additional medical conditions while on [facility], he needs PT/OT intervention, patient/family education and training, possible Neuropsych and Neurosurgery consults with any other needed consults prn, nursing medication review and management of bowel/bladder function  RECOMMENDED LEVEL OF CARE:   Patient is a 69year old male that presented to [facility] on 3/19/2023 with complaints of progressively worsening right sided weakness over the past week  He has been stumbling at home and having difficulty ambulating  Also endorses SOB  CTA head/neck showed right frontal lobe cystic lesion with surrounding vasogenic edema most consistent with metastatic disease; RUL mass with right hilar adenopathy   MRI brain showed rim-enhancing 2 4cm centrally necrotic metastatic lesion in right frontal lobe with surrounding severe vasogenic edema, mass effect in right hemisphere with approximately 2 4mm right-left midline shift; small focus of anterior cortical frontal acute to subacute ischemia  Patient was transferred to [facility] for Neurosurgery evaluation  He was initiated on Decardon and Keppra  Neurology was consulted, with unable to rule out embolic infarct d/t hypercoagulable state related to malignancy  Patient was continued on ASA and initiated on statin  Neurosurgery was consulted, with no emergent surgical intervention warranted  Recommendations were made to continue on Decadron and Keppra, with holding ASA given possible need for eventual surgery  CT C/A/P showed 3 8 x 3 5cm RUL lung mass with associated overlying pleural tag and adjacent to pleural thickening; right hilar lymphadenopathy; small lower right paratracheal left paratracheal lymph node  Pulmonology was consulted, with recommendations to defer on biopsy of lung mass unless brain mass came back negative  Suspected patient with likely primary lung cancer with metastasis to mediastinal lymph nodes and brain  Medical Oncology was consulted, with patient to follow-up outpatient, and recommendations to be made pending pathology results  Full body scan was completed, which showed no metastatic disease  Repeat MRI brain with DTI on 3/22 showed no interval changes, with stable known brain mass with high suspicion for metastatic disease (lung)  ECHO showed an EF of 33%, grade 1 diastolic dysfunction, mild LA dilation and mild TVR  On 3/23, patient went to the OR with Neurosurgery for right frontal craniotomy image-guided for tumor EUA and melton insertion; EBL minimal  Frozen pathology consistent with metastatic adenocarcinoma  Per Neurosurgery, Decadron wean was initiated, and all AC/AP is to remain on hold for at least 2 weeks  He was cleared to start Lovenox SQ for DVT prophylaxis   Urology was consulted regarding difficult melton insertion intraop due to bladder neck contracture/urethral stricture and BPH  Recommendations were made to maintain melton catheter, with plans for voiding trial at rehab  Preliminary biopsy results showing non-small cell carcinoma, with molecular study pending  Prior to patient's admission, patient was fully Independent with ADLs and IADLs  Family provides transportation  He was Independent without use of AD for mobility  Currently, patient is Min assist with use of RW for gait and transfers, and Supervision for UB ADLs, Min/Mod assist for LB ADLs  Close medical management and PM&R management is recommended at this time while patient is on the [facility]  Inpatient acute rehab is recommended for patient to maximize overall strength and mobility upon discharge to home with support of family

## 2023-03-30 NOTE — H&P
PHYSICAL MEDICINE AND REHABILITATION H&P/ADMISSION NOTE  Mariela Gasca 69 y o  male MRN: [id number]  Unit/Bed#: -01 Encounter: [id number]     Rehab Diagnosis: Impairment of mobility, safety and Activities of Daily Living (ADLs) due to Brain Dysfunction:  02 1  Non-Traumatic   Etiologic: Right frontal brain metastasis     History of Present Illness:   Mariela Gasca is a 69 y o  male with a PMH significant for HTN, prostate cancer and CVA in 2011, who presented to the [facility] on 03/19/23 with one week of progressive left sided weakness  CTA with right frontal lobe cystic lesion with surrounding vasogenic edema consistent with metastatic disease  Local mass effect on the right lateral ventricle without significant midline shift  Right upper lobe mass with partially imaged right hilar adenopathy  MRI confirmed  Neurology and neurosurgery consulted  Patient initiated on Keppra and Decadron with plans for surgical resection within the week  Pulmonology consulted with plans for tissue biopsy after neurosurgery  Hem/Onc consulted and recommended outpatient follow up for definitive treatment plan  A full body scan was obtained on 3/22 which showed no metastatic disease  Pt taken to the OR with Dr Frank Greene on 03/23 and is s/p right frontal craniotomy  Intraop, patient had melton catheter placement that was difficulty  Keppra ordered post-op for one week for seizure ppx  Decadron taper continued for cerebral edema  Aspirin on hold for at least 2 weeks post-op  Urology consulted and recommended leaving in place for approximately seven days with removal at rehab and eventual follow up as an outpatient  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  Patient evaluated by PT/OT and deemed appropriate for post-acute rehabilitation services  He was accepted to [facility] and arrived on 3/30/23       Plan:     Rehabilitation  • Functional deficits: impaired mobility, self care, left-sided weakness (LE>UE)   • Begin PT/OT/SLP  Rehabilitation goals are to achieve a modified independent level with mobility and self care  Prognosis is good  ELOS is 10-14 days  Estimated discharge is home  DVT prophylaxis  • Lovenox SQ INJ 40 mg Daily     Pain  • Tylenol 975 mg Q8H PRN for mild pain  • Oxycodone 2 5-5 mg Q4H PRN for mod-severe pain    Bladder plan  • Mcclelland Catheter in place x7 days now   • Mcclelland insertion with difficult placement   • Reached out to Urology to confirm if we can remove in [facility]  • Urology requesting AM removal and PM follow up  • Requesting Virtual appointment if possible since patient just arrived today    Bowel plan  • Continent  • Last BM 3/30/23  • Continue Senna daily, Colace BID, Dulcolax rectal suppository daily PRN    Code Status  • Full code- confirmed with patient  Wife present     Skin care  · Monitor skin daily  · Apply skin nourishing cream   · Reposition Q2H to offload pressure  · Elevate heels off bed   · Monitor incision site to scalp- POD#7   · Reached out to neurosurgery who would like photo of scalp prior to removal of staples  Originally had celeste on 4/6 to remove  Cleared to remove on 4/6 in [facility]  Will contact neurosurgery day of with photo of staples  Mcclelland catheter in place  Assessment & Plan  · Mcclelland with difficult insertion intraop x7 days now   · Reached out to Urology to see if appropriate for us to remove in [facility]   · Urology would like removal in AM and voiding trial   · If needs to be reinserted and difficulty, urology recommended sending patient to ED for insertion       Lung nodule  Assessment & Plan  · Noted on imaging   · Has follow up with hem/onc 4/5 for definitive treatment plan    Cerebral edema ([address] )  Assessment & Plan  · Continue Decadron taper     Brain compression (HCC)  Assessment & Plan  · Plan under brain mass    Hypercholesterolemia  Assessment & Plan  · Continue Lipitor 40 mg daily after dinner    Essential "hypertension  Assessment & Plan  · Monitor vitals  · Continue Cozaar 50 mg daily and Norvasc 10 mg daily    * Brain mass  Assessment & Plan  · Presented with one week of left sided weakness   · CTA with right frontal lobe cystic lesion with surrounding vasogenic edema consistent with metastatic ds  Local mass effect on the right alteral ventricle without significant midline shift  Right upper lobe mass with partially imaged right hilar adenopathy  · S/p Right frontal Craniotomy image-guided for tumor resection with Dr Damari Tellez on 3/23/23  · POD #7   · Continue Keppra 500 mg Q12H x1 week for seizure ppx   · Continue Decadron taper for associated cerebral edema   · Continue to hold Aspirin for at least 2 weeks per neurosurgery   · Will need hem/onc follow up with concern from lung primary disease with RUL mass   · Hem/onc celeste on 4/5   · Neurosurgery celeste on 4/6 can be cancelled  Originally for staple removal  Neurosurgery ok for us to remove here in [facility], need to send photo of incision site prior to removal  Pt will follow up with Neurosurgery in May  · Acute chomprehensive interdisciplinary inpatient rehabilitation to include intensive skilled therapies as outlines with oversight and management by a rehabilitation Physician Assistant overseen by rehabilitation physician as well as inpatient rehabilitation nursing, case management and weekly interdisciplinary team meeting            Subjective: \"My goal is to get back to riding my mountain bike  \" Patient admits to right-sided weakness, LE>UE  He denies chest pain, shortness of breath, abdominal pain, constipation  Has melton catheter in place without discomfort  For voiding trial in the AM per urology recommendations  Wife present at time of evaluation  All questions/concerns answered  Review of Systems   Constitutional: Negative  Negative for fatigue  HENT: Negative  Negative for trouble swallowing  Respiratory: Negative    Negative for shortness of " "breath  Cardiovascular: Negative  Negative for chest pain  Gastrointestinal: Negative  Negative for abdominal pain and constipation  Genitourinary: Mcclelland in place; without discomfort   Musculoskeletal: Negative  Neurological: Positive for weakness  Negative for dizziness, speech difficulty and headaches  UE>LE   Psychiatric/Behavioral: Negative  Function:  Prior level of function and living situation:  Patient resides in a single family home  Bed/bathroom on 2nd floor with 1/2 bath on the main level  4-5 ZACH home, full flight to 2nd floor   He is  and lives with his spouse  He will have 24 hour supervision/physical assistance available upon discharge  Spouse works in [employer] and is gone very other week  She can work from home if needed   PTA independent with ADLs and IADLs, family provided transport  1-4 reported falls within the last 6 months    Previous DME: single point cane     Current level of function:  Physical Therapy: transfers and ambulation min assist   Occupational Therapy: grooming s/castanon, UB bathing s/castanon, LB bathing min, UB dressing s/castanon, LB dressing mod   Speech Therapy: n/a    Physical Exam:  /81 (BP Location: Right arm)   Pulse 68   Temp 98 8 °F (37 1 °C) (Temporal)   Resp 20   Ht 5' 9\" (1 753 m)   Wt 81 4 kg (179 lb 7 3 oz)   SpO2 97%   BMI 26 50 kg/m²      No intake or output data in the 24 hours ending 03/30/23 1505    Body mass index is 26 5 kg/m²  Physical Exam  Vitals and nursing note reviewed  Constitutional:       General: He is not in acute distress  Appearance: He is not ill-appearing  HENT:      Head: Normocephalic  Comments: Incision site to scalp C/D/I  Eyes:      Extraocular Movements: Extraocular movements intact  Pupils: Pupils are equal, round, and reactive to light  Cardiovascular:      Rate and Rhythm: Normal rate and regular rhythm  Pulses: Normal pulses  Heart sounds: Normal heart sounds   " No murmur heard  Pulmonary:      Effort: Pulmonary effort is normal  No respiratory distress  Breath sounds: Normal breath sounds  Abdominal:      General: Bowel sounds are normal  There is distension  Tenderness: There is no abdominal tenderness  Musculoskeletal:      Right lower leg: No edema  Left lower leg: No edema  Skin:     General: Skin is warm and dry  Neurological:      Mental Status: He is alert and oriented to person, place, and time  Motor: Weakness present  Comments: Right sided weakness (UE>LE)    Psychiatric:         Mood and Affect: Mood normal          Behavior: Behavior normal             Labs, medications, and imaging personally reviewed      Laboratory:    Lab Results   Component Value Date    SODIUM 137 03/29/2023    K 4 1 03/29/2023     03/29/2023    CO2 25 03/29/2023    BUN 20 03/29/2023    CREATININE 0 68 03/29/2023    GLUC 113 03/29/2023    CALCIUM 8 1 (L) 03/29/2023     Lab Results   Component Value Date    WBC 12 14 (H) 03/29/2023    HGB 13 5 03/29/2023    HCT 41 7 03/29/2023    MCV 91 03/29/2023     03/29/2023     Lab Results   Component Value Date    INR 1 06 03/24/2023    INR 1 00 03/23/2023    INR 0 96 03/19/2023    PROTIME 14 0 03/24/2023    PROTIME 13 4 03/23/2023    PROTIME 12 8 03/19/2023         Current Facility-Administered Medications:   •  acetaminophen (TYLENOL) tablet 975 mg, 975 mg, Oral, Q8H PRN, Alise Crowley PA-C  •  [START ON 3/31/2023] amLODIPine (NORVASC) tablet 10 mg, 10 mg, Oral, Daily, Alise Crowley PA-C  •  atorvastatin (LIPITOR) tablet 40 mg, 40 mg, Oral, After Dinner, Alise Crowley PA-C  •  bisacodyl (DULCOLAX) rectal suppository 10 mg, 10 mg, Rectal, Daily PRN, Alise Crowley PA-C  •  dexamethasone (DECADRON) tablet 2 mg, 2 mg, Oral, Q8H [facility] **FOLLOWED BY** [START ON 3/31/2023] dexamethasone (DECADRON) tablet 2 mg, 2 mg, Oral, Q12H [facility] **FOLLOWED BY** [START ON 4/3/2023] dexamethasone (DECADRON) tablet 2 mg, 2 mg, Oral, Q24H [address], Earma Imus, PA-C  •  docusate sodium (COLACE) capsule 100 mg, 100 mg, Oral, BID, Earma Imus, PA-C  •  [START ON 3/31/2023] enoxaparin (LOVENOX) subcutaneous injection 40 mg, 40 mg, Subcutaneous, Daily, Earma Imus, PA-C  •  [START ON 3/31/2023] losartan (COZAAR) tablet 50 mg, 50 mg, Oral, Daily, Earma Imus, PA-C  •  melatonin tablet 3 mg, 3 mg, Oral, HS, Earma Imus, PA-C  •  ondansetron (ZOFRAN-ODT) dispersible tablet 4 mg, 4 mg, Oral, Q6H PRN, Earma Imus, PA-C  •  oxyCODONE (ROXICODONE) IR tablet 5 mg, 5 mg, Oral, Q4H PRN, Earma Imus, PA-C  •  oxyCODONE (ROXICODONE) split tablet 2 5 mg, 2 5 mg, Oral, Q4H PRN, Earma Imus, PA-C  •  [START ON 3/31/2023] pantoprazole (PROTONIX) EC tablet 40 mg, 40 mg, Oral, Early Morning, Earma Imus, PA-C  •  senna (SENOKOT) tablet 17 2 mg, 2 tablet, Oral, Daily, Earma Imus, PA-C    No Known Allergies     Patient Active Problem List    Diagnosis Date Noted   • Del Castillo catheter in place 03/24/2023   • Lung nodule 03/21/2023   • Brain mass 03/20/2023   • Brain compression ([address] ) 03/20/2023   • Cerebral edema ([address] ) 03/20/2023   • Hypercholesterolemia 09/20/2021   • CVA (cerebral vascular accident) ([address] ) 08/12/2021   • Essential hypertension 09/11/2020   • Annual physical exam 09/11/2020   • Negative depression screening 02/24/2020   • Overweight (BMI 25 0-29 9) 02/24/2020     Past Medical History:   Diagnosis Date   • Hypertension    • Prostate cancer ([address] ) 2001   • Rectal bleeding    • Stroke [facility])     2011       Past Surgical History:   Procedure Laterality Date   • COLONOSCOPY     • CRANIOTOMY Right 3/23/2023    Procedure: Right frontal CRANIOTOMY IMAGE-GUIDED FOR TUMOR;  Surgeon: Cee Glover MD;  Location: [facility];  Service: Neurosurgery   • VT CYSTOURETHROSCOPY N/A 3/23/2023    Procedure: EUA, DEL CASTILLO INSERTION;  Surgeon: Roxana Coello MD;  Location: [facility];  Service: Urology   • PROSTATECTOMY  2001   • TONSILLECTOMY       Social History     Socioeconomic History   • Marital status: /Civil Union     Spouse name: Not on file   • Number of children: Not on file   • Years of education: Not on file   • Highest education level: Not on file   Occupational History   • Not on file   Tobacco Use   • Smoking status: Former     Packs/day: 1 00     Years: 15 00     Pack years: 15 00     Types: Cigarettes     Passive exposure: Never   • Smokeless tobacco: Never   • Tobacco comments:     i quit when i was 28  not sure of exact dates   Vaping Use   • Vaping Use: Never used   Substance and Sexual Activity   • Alcohol use: Yes     Alcohol/week: 6 0 standard drinks     Types: 6 Cans of beer per week     Comment: does not drink every day   • Drug use: Not Currently     Types: Marijuana   • Sexual activity: Yes     Partners: Female     Birth control/protection: None   Other Topics Concern   • Not on file   Social History Narrative   • Not on file     Social Determinants of Health     Financial Resource Strain: Not on file   Food Insecurity: No Food Insecurity   • Worried About Running Out of Food in the Last Year: Never true   • Ran Out of Food in the Last Year: Never true   Transportation Needs: No Transportation Needs   • Lack of Transportation (Medical): No   • Lack of Transportation (Non-Medical):  No   Physical Activity: Not on file   Stress: Not on file   Social Connections: Not on file   Intimate Partner Violence: Not on file   Housing Stability: Low Risk    • Unable to Pay for Housing in the Last Year: No   • Number of Places Lived in the Last Year: 1   • Unstable Housing in the Last Year: No     Social History     Tobacco Use   Smoking Status Former   • Packs/day: 1 00   • Years: 15 00   • Pack years: 15 00   • Types: Cigarettes   • Passive exposure: Never   Smokeless Tobacco Never   Tobacco Comments    i quit when i was 28  not sure of exact dates     Social History     Substance and Sexual Activity   Alcohol Use Yes   • Alcohol/week: 6 0 standard drinks   • Types: 6 Cans of beer per week    Comment: does not drink every day     Family History   Problem Relation Age of Onset   • Dementia Mother            • Breast cancer Sister            • Prostate cancer Brother         Received Radiation         Medical Necessity Criteria for [facility] Admission: Hypertension, Bowel/Bladder Management, Incision/Wound care and Urinary retention  In addition, the preadmission screen, post-admission physical evaluation, overall plan of care and admissions order demonstrate a reasonable expectation that the following criteria were met at the time of admission to the [facility]  1  The patient requires active and ongoing therapeutic intervention of multiple therapy disciplines (physical therapy, occupational therapy, speech-language pathology, or prosthetics/orthotics), one of which is physical or occupational therapy  2  Patient requires an intensive rehabilitation therapy program, as defined in Chapter 1, section 110 2 2 of the CMS Medicare Policy Manual  This intensive rehabilitation therapy program will consist of at least 3 hours of therapy per day at least 5 days per week or at least 15 hours of intensive rehabilitation therapy within a 7 consecutive day period, beginning with the date of admission to the [facility]  3  The patient is reasonably expected to actively participate in, and benefit significantly from, the intensive rehabilitation therapy program as defined in Chapter 1, section 110 2 2 of the CMS Medicare Policy Manual at this time of admission to the [facility]  He can reasonably be expected to make measurable improvement (that will be of practical value to improve the patient’s functional capacity or adaptation to impairments) as a result of the rehabilitation treatment, as defined in section 110 3, and such improvement can be expected to be made within the prescribed period of time   As noted in the CMS Medicare Policy Manual, the patient need not be expected to achieve complete independence in the domain of self-care nor be expected to return to his or her prior level of functioning in order to meet this standard  4  The patient must require physician supervision by a rehabilitation physician  As such, a rehabilitation physician will conduct face-to-face visits with the patient at least 3 days per week throughout the patient’s stay in the [facility] to assess the patient both medically and functionally, as well as to modify the course of treatment as needed to maximize the patient’s capacity to benefit from the rehabilitation process  5  The patient requires an intensive and coordinated interdisciplinary approach to providing rehabilitation, as defined in Chapter 1, section 110 2 5 of the CMS Medicare Policy Manual  This will be achieved through periodic team conferences, conducted at least once in a 7-day period, and comprising of an interdisciplinary team of medical professionals consisting of: a rehabilitation physician, registered nurse,  and/or , and a licensed/certified therapist from each therapy discipline involved in treating the patient  Changes Since Pre-admission Assessment: None -This patient's participation in rehab continues to be reasonable, necessary and appropriate  CMS Required Post-Admission Physician Evaluation Elements  History and Physical, including medical history, functional history and active comorbidities as in above text  Post-Admission Physician Evaluation:  The patient has the potential to make improvement and is in need of physical, occupational, and/or therapy services  The patient may also need nutritional services  Given the patient's complex medical condition and risk of further medical complications, rehabilitative services cannot be safely provided at a lower level of care, such as a skilled nursing facility   I have reviewed the patient's functional and medical status at the time of the preadmission screening and they are the same as on the day of this admission  I acknowledge that I have personally performed a full physical examination on this patient within 24 hours of admission  The patient and/or family demonstrated understanding the rehabilitation program and the discharge process after we discussed them  Agree in entirety: yes  Minor adaptions: none    Major changes: none    Barbara Mulligan PA-C    ** Please Note: Fluency Direct voice to text software may have been used in the creation of this document  **      I have spent a total time of 75 minutes on 03/30/23 in caring for this patient including Prognosis, Risks and benefits of tx options, Instructions for management, Patient and family education, Importance of tx compliance, Risk factor reductions, Impressions, Counseling / Coordination of care, Documenting in the medical record, Reviewing / ordering tests, medicine, procedures  , Obtaining or reviewing history   and Communicating with other healthcare professionals

## 2023-03-30 NOTE — NURSING NOTE
69 yr old male admitted with brain mass  Chrissy intact to right side of head, dried blood noted between staples  Offers no complaints  Denies pain or discomfort  Oriented to room and surroundings and therapy schedules  Callbell within reach and safety maintained  Pt arrived with melton catheter for urinary retention, draining qs liliya urine  Order for melton to be discontinued in a m  and voiding trial started  Education and care plan initiated  Left sided weakness from previous stroke, pt states increased left sided weakness and numbness which brought him to hospital   AAO x 3  Safety maintained

## 2023-03-30 NOTE — PLAN OF CARE
Problem: NEUROSENSORY - ADULT  Goal: Achieves stable or improved neurological status  Description: INTERVENTIONS  - Monitor and report changes in neurological status  - Monitor vital signs such as temperature, blood pressure, glucose, and any other labs ordered   - Initiate measures to prevent increased intracranial pressure  - Monitor for seizure activity and implement precautions if appropriate    Outcome: Progressing  Goal: Remains free of injury related to seizures activity  Description: INTERVENTIONS  - Maintain airway, patient safety  and administer oxygen as ordered  - Monitor patient for seizure activity, document and report duration and description of seizure to physician/advanced practitioner  - If seizure occurs,  ensure patient safety during seizure  - Reorient patient post seizure  - Seizure pads on all 4 side rails  - Instruct patient/family to notify RN of any seizure activity including if an aura is experienced  - Instruct patient/family to call for assistance with activity based on nursing assessment  - Administer anti-seizure medications if ordered  Outcome: Progressing  Goal: Achieves maximal functionality and self care  Description: INTERVENTIONS  - Monitor swallowing and airway patency with patient fatigue and changes in neurological status  - Encourage and assist patient to increase activity and self care     - Encourage visually impaired, hearing impaired and aphasic patients to use assistive/communication devices  Outcome: Progressing     Problem: Prexisting or High Potential for Compromised Skin Integrity  Goal: Skin integrity is maintained or improved  Description: INTERVENTIONS:  - Identify patients at risk for skin breakdown  - Assess and monitor skin integrity  - Assess and monitor nutrition and hydration status  - Monitor labs   - Assess for incontinence   - Turn and reposition patient  - Assist with mobility/ambulation  - Relieve pressure over bony prominences  - Avoid friction and shearing  - Provide appropriate hygiene as needed including keeping skin clean and dry  - Evaluate need for skin moisturizer/barrier cream  - Collaborate with interdisciplinary team   - Patient/family teaching  - Consider wound care consult   Outcome: Progressing

## 2023-03-30 NOTE — ASSESSMENT & PLAN NOTE
· Noted on imaging   · Has follow up with hem/onc 4/18 for definitive treatment plan  · Rad/Onc celeste tomorrow 4/12

## 2023-03-30 NOTE — CASE MANAGEMENT
Case Management Discharge Planning Note    Patient name Daneil Severin  Location [address] [facility] 729/[facility] 735-90 MRN [id number]  : 1955 Date 3/30/2023       Current Admission Date: 3/20/2023  Current Admission Diagnosis:Brain mass   Patient Active Problem List    Diagnosis Date Noted   • Mcclelland catheter in place 2023   • Lung nodule 2023   • Brain mass 2023   • Brain compression ([address] ) 2023   • Cerebral edema ([address] ) 2023   • Hypercholesterolemia 2021   • CVA (cerebral vascular [address]) [address] [address] [facility] ) 2021   • Essential hypertension 2020   • Annual physical exam 2020   • Negative depression screening 2020   • Overweight (BMI 25 0-29 9) 2020      LOS (days): 10  Geometric Mean LOS (GMLOS) (days): 6 60  Days to GMLOS:-2 2     OBJECTIVE:  Risk of Unplanned Readmission Score: 12 6         Current admission status: Inpatient   Preferred Pharmacy:    [address]  Phone: [phone] Fax: [phone]    Primary Care Provider: Verna Morales DO    Primary Insurance: [address]: [address] [facility] [address] Number: [id number]

## 2023-03-30 NOTE — ASSESSMENT & PLAN NOTE
· Mcclelland with difficult insertion intraop x7 days now   · Mcclelland discontinued 3/31 successfully without complication   Voiding trial successful  · Patient now passing urine without complication   · OP urology follow up   · Resolved

## 2023-03-30 NOTE — CASE MANAGEMENT
Nadya Smith 48 has received approved authorization from insurance:   [address] [place] [address] in by Rep: [address] received for: Acute Rehab  Facility: [address] [place] [address] #:  [id number]  Start of Care: 3/29  Next Review Date: 4/5  Care Coordinator: Corrinne Lavender P#: [phone] [id number]  Submit next review to: [phone]   Care Manager notified: Ruby Alvares

## 2023-03-30 NOTE — ASSESSMENT & PLAN NOTE
· Presented with one week of left sided weakness   · CTA with right frontal lobe cystic lesion with surrounding vasogenic edema consistent with metastatic ds  Local mass effect on the right alteral ventricle without significant midline shift  Right upper lobe mass with partially imaged right hilar adenopathy  · S/p Right frontal Craniotomy image-guided for tumor resection with Dr Brendan Carter on 3/23/23  · Keppra - completed   · Decadron taper completed  · Continue to hold Aspirin for at least 2 weeks per neurosurgery   · Neurosurgeon and PA on do not disturb, unsure if surgery  Will reach out when able and informed patient if I cannot get a hold of them while he is here, I will call to let him know if it is okay to resume Aspirin   · Hem/onc celeste 4/18  · Rad/Onc 4/12   · MRI 04/07/23: Surgical cavity in the right paramedian posterior frontal lobe is stable in size from prior study with blood products noted  Surrounding vasogenic edema has decreased in the interval  Continue follow up imaging recommended  No additional masses are seen    · Staples removed 04/06  · Pt will follow up with Neurosurgery in May  · Acute chomprehensive interdisciplinary inpatient rehabilitation to include intensive skilled therapies as outlines with oversight and management by a rehabilitation Physician Assistant overseen by rehabilitation physician as well as inpatient rehabilitation nursing, case management and weekly interdisciplinary team meeting

## 2023-03-30 NOTE — TREATMENT PLAN
Individualized Plan of [address] 69 y o  male MRN: [id number]  Unit/Bed#: -01 Encounter: [id number]     PATIENT INFORMATION  ADMISSION DATE: 3/30/2023  1:31 PM JAI CATEGORY:Brain Dysfunction:  02 1  Non-Traumatic   ADMISSION DIAGNOSIS: Brain mass [G93 89]  EXPECTED LOS: 10-14 days     MEDICAL/FUNCTIONAL PROGNOSIS  Based on my assessment of the patient's medical conditions and current functional status, the prognosis for attaining medical and functional goals or the IRF stay is:  Good      Cardiopulmonary function: Ensure cardiopulmonary stability and optimize cardiopulmonary function not only at rest but with activity as patient's activity level significantly increases in acute rehab compared with prior to transfer in preparation for safe discharge from [facility]  Must closely and frequently monitor blood pressure and HR to ensure adequate cardiac output during ADLs and ambulation as patient is at increased risk for orthostatic hypotension/syncope and potential injury if not monitored for and managed adequately  Blood pressure management:    Frequent monitoring of blood pressure with appropriate adjustments in blood pressure medication management to optimize blood pressure control and prevent/limit renal complications  Monitoring impact of blood pressure and side-effects of blood pressure medications at rest and with activity  Pain management:  Pain will improve with frequent evaluation of pain, careful adjustments in medications, frequent re-evaluation of patient's pain and medical/neurologic status to ensure optimal pain control, avoidance of potential serious and even life-threatening side-effects and drug interactions, as well as weaning pain medications as soon as possible to decrease risk of short and long-term use        Neurologic Disorder: Brain mass s/p craniotomy and tumor resection causing impaired mobility, ADLs, and gait:  intensive skilled therapies with physical therapy and occupational therapy with close oversight and management by rehab specialized physician in acute rehabilitation setting to most expeditiously and effectively improve functional mobility, transfers, upper and lower body strengthening, conditioning, balance, and gait training with appropriate assistive device  Patient will have optimal supervision and management of patient's underlying neurologic disorder with specialized rehabilitation physician during this period of recovery to ensure most expeditious and optimal recovery with decreased risks of fall/injury and other complications including acute worsening of neuro disorder, decrease risk of VTE, PNA, and skin ulceration  Inpatient rehabilitation education/teaching: To be provided to patient and typically family/caregiver (if able to be identified) by all skilled therapists, rehab nursing, case management, and rehab specialized physician to ensure optimal recovery and decrease risks of complications in both acute rehabilitation setting as well as after discharge  Acute indwelling melton management: Appropriate urinary retention management and voiding protocols which include close monitoring and evaluation of bladder scans/post-void residuals once melton removed, toileting program, and monitor voided urinary output with goal of expeditious but safe and appropriate removal of indwelling melton catheter, improved urinary retention and risks associated with retention some of which include infection, bladder stretch injury, and kidney injury  Adjustments in medications are common and will be provided if indicated as well  Skin wounds: Appropriate skin checks for wound/skin evaluation including evaluation of healing, worsening of wounds, or signs of infection  Wound care management from rehab nursing, wound care nursing, physicians    Ensure frequent appropriate turning, positioning in bed, in chair, when mobilizing, and when appropriate with use of appropriate devices to optimize healing and decrease risk of worsening or new skin breakdown  Medical Goals: Patient will be medically stable for discharge to [facility] upon completion of rehab program and Patient will be able to manage medical conditions and comorbid conditions with medications and follow up upon completion of rehab program    [address]: Home - with supervision and Home - Assistance    ANTICIPATED FOLLOW-UP SERVICE:   Outpatient Therapy Services: PT and OT      Home Health Services: PT and OT    DISCIPLINE SPECIFIC PLANS:  Required Disciplines & Services: Rehabillitation Nursing and Case Management    REQUIRED THERAPY:  Therapy Hours per Day Days per Week Total Days   Physical Therapy 1 5 5   Occupational Therapy 1 5 5   Speech/Language Therapy 1 5 5   NOTE: Additional therapy time(s) may be added as appropriate to meet patient needs and to achieve functional goals      Patient will either participate in above therapy regimen or participate in 900 minutes of therapy within 7 day week consisting of PT, OT and SLP due to the following medical procedure/condition:Brain Dysfunction:  02 1  Non-Traumatic    ANTICIPATED FUNCTIONAL OUTCOMES:  ADL:  Patient will return to premorbid levels with least restrictive device   Bladder/Bowel:  Pt will return to premorbid levels with least restrictive device   Transfers:  pt will return to premorbid levels with least restrictive device   Locomotion:  patient will return to premorbid levels with least restrictive device   Cognitive:  patient will return to premorbid levels      DISCHARGE PLANNING NEEDS  Equipment needs: Discharge needs to be reviewed with team      REHAB ANTICIPATED PARTICIPATION RESTRICTIONS:  Assist with Bathing Shower, Assist with Mobility, Assist with Tub Shower Transfer, Inability to Drive, Rquires Assist with ADLS, Requires Assit with Opal Alvarez Assit with Steps and Weight Bearing as tolerated

## 2023-03-31 LAB
ALBUMIN SERPL BCP-MCNC: 3.7 G/DL (ref 3.5–5)
ALP SERPL-CCNC: 52 U/L (ref 34–104)
ALT SERPL W P-5'-P-CCNC: 21 U/L (ref 7–52)
ANION GAP SERPL CALCULATED.3IONS-SCNC: 7 MMOL/L (ref 4–13)
AST SERPL W P-5'-P-CCNC: 22 U/L (ref 13–39)
BASOPHILS # BLD MANUAL: 0 THOUSAND/UL (ref 0–0.1)
BASOPHILS NFR MAR MANUAL: 0 % (ref 0–1)
BILIRUB SERPL-MCNC: 0.57 MG/DL (ref 0.2–1)
BUN SERPL-MCNC: 18 MG/DL (ref 5–25)
CALCIUM SERPL-MCNC: 8.3 MG/DL (ref 8.4–10.2)
CHLORIDE SERPL-SCNC: 102 MMOL/L (ref 96–108)
CO2 SERPL-SCNC: 25 MMOL/L (ref 21–32)
CREAT SERPL-MCNC: 0.69 MG/DL (ref 0.6–1.3)
EOSINOPHIL # BLD MANUAL: 0 THOUSAND/UL (ref 0–0.4)
EOSINOPHIL NFR BLD MANUAL: 0 % (ref 0–6)
ERYTHROCYTE [DISTWIDTH] IN BLOOD BY AUTOMATED COUNT: 12.7 % (ref 11.6–15.1)
GFR SERPL CREATININE-BSD FRML MDRD: 96 ML/MIN/1.73SQ M
GLUCOSE P FAST SERPL-MCNC: 106 MG/DL (ref 65–99)
GLUCOSE SERPL-MCNC: 106 MG/DL (ref 65–140)
GLUCOSE SERPL-MCNC: 111 MG/DL (ref 65–140)
GLUCOSE SERPL-MCNC: 140 MG/DL (ref 65–140)
GLUCOSE SERPL-MCNC: 143 MG/DL (ref 65–140)
GLUCOSE SERPL-MCNC: 155 MG/DL (ref 65–140)
HCT VFR BLD AUTO: 42.8 % (ref 36.5–49.3)
HGB BLD-MCNC: 13.9 G/DL (ref 12–17)
LYMPHOCYTES # BLD AUTO: 1.66 THOUSAND/UL (ref 0.6–4.47)
LYMPHOCYTES # BLD AUTO: 11 % (ref 14–44)
MCH RBC QN AUTO: 30 PG (ref 26.8–34.3)
MCHC RBC AUTO-ENTMCNC: 32.5 G/DL (ref 31.4–37.4)
MCV RBC AUTO: 92 FL (ref 82–98)
METAMYELOCYTES NFR BLD MANUAL: 1 % (ref 0–1)
MONOCYTES # BLD AUTO: 1.66 THOUSAND/UL (ref 0–1.22)
MONOCYTES NFR BLD: 11 % (ref 4–12)
NEUTROPHILS # BLD MANUAL: 11.63 THOUSAND/UL (ref 1.85–7.62)
NEUTS BAND NFR BLD MANUAL: 3 % (ref 0–8)
NEUTS SEG NFR BLD AUTO: 74 % (ref 43–75)
PLATELET # BLD AUTO: 216 THOUSANDS/UL (ref 149–390)
PLATELET BLD QL SMEAR: ADEQUATE
PMV BLD AUTO: 10.3 FL (ref 8.9–12.7)
POTASSIUM SERPL-SCNC: 4.1 MMOL/L (ref 3.5–5.3)
PROT SERPL-MCNC: 6.3 G/DL (ref 6.4–8.4)
RBC # BLD AUTO: 4.63 MILLION/UL (ref 3.88–5.62)
RBC MORPH BLD: NORMAL
SODIUM SERPL-SCNC: 134 MMOL/L (ref 135–147)
WBC # BLD AUTO: 15.1 THOUSAND/UL (ref 4.31–10.16)

## 2023-03-31 RX ADMIN — DEXAMETHASONE 2 MG: 0.5 TABLET ORAL at 05:10

## 2023-03-31 RX ADMIN — DOCUSATE SODIUM 100 MG: 100 CAPSULE, LIQUID FILLED ORAL at 17:11

## 2023-03-31 RX ADMIN — LOSARTAN POTASSIUM 50 MG: 50 TABLET, FILM COATED ORAL at 08:41

## 2023-03-31 RX ADMIN — SENNOSIDES 17.2 MG: 8.6 TABLET, FILM COATED ORAL at 08:41

## 2023-03-31 RX ADMIN — ENOXAPARIN SODIUM 40 MG: 40 INJECTION SUBCUTANEOUS at 08:41

## 2023-03-31 RX ADMIN — AMLODIPINE BESYLATE 10 MG: 10 TABLET ORAL at 08:41

## 2023-03-31 RX ADMIN — DEXAMETHASONE 2 MG: 0.5 TABLET ORAL at 21:24

## 2023-03-31 RX ADMIN — Medication 3 MG: at 21:24

## 2023-03-31 RX ADMIN — ATORVASTATIN CALCIUM 40 MG: 40 TABLET, FILM COATED ORAL at 17:11

## 2023-03-31 RX ADMIN — DEXAMETHASONE 2 MG: 0.5 TABLET ORAL at 14:22

## 2023-03-31 RX ADMIN — ACETAMINOPHEN 975 MG: 325 TABLET ORAL at 04:31

## 2023-03-31 RX ADMIN — PANTOPRAZOLE SODIUM 40 MG: 40 TABLET, DELAYED RELEASE ORAL at 05:10

## 2023-03-31 RX ADMIN — DOCUSATE SODIUM 100 MG: 100 CAPSULE, LIQUID FILLED ORAL at 08:41

## 2023-03-31 NOTE — UTILIZATION REVIEW
NOTIFICATION OF ADMISSION DISCHARGE   This is a Notification of Discharge from [facility]  Please be advised that this patient has been discharge from our facility  Below you will find the admission and discharge date and time including the patient’s disposition  UTILIZATION REVIEW CONTACT:  Debora Stack  Utilization   Network Utilization Review Department  Phone: [phone] x carefully listen to the prompts  All voicemails are confidential   Email: [email or web address]     ADMISSION INFORMATION  PRESENTATION DATE: 3/20/2023 12:13 AM  OBERVATION ADMISSION DATE:   INPATIENT ADMISSION DATE: 3/20/23 12:13 AM   DISCHARGE DATE: 3/30/2023 12:30 PM   [facility]    IMPORTANT INFORMATION:  Send all requests for admission clinical reviews, approved or denied determinations and any other requests to dedicated fax number below belonging to the campus where the patient is receiving treatment   List of dedicated fax numbers:  [address] DENIALS (Administrative/Medical Necessity) [phone]   [address] [place] [address] (Maternity/NICU/Pediatrics) [phone]   [facility] [phone]   [place] [address] [phone]   [address] [phone]   220 [facility] [phone] 90 [facility] [phone]   [address] [place], [place] [address] [phone]   [facility]  [phone]   [facility] [phone]   412 [facility] [address] [facility] [phone]

## 2023-03-31 NOTE — CASE MANAGEMENT
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

## 2023-03-31 NOTE — PCC CARE MANAGEMENT
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 based on Pt & spouse preference  SW will continue to monitor & assist as needed with Tx & DC planning  4/11/23 - DC instructions reviewed with Pt & Pt's spouse, who expressed an understanding & agreement  Pt being [address] home today after 4 PM, being transported by family via car, which tx team has determined to be a safe mode of transport  FU appts indicated on DC instructions, to be scheduled by Pt per scheduling preferences  Outpatient PT arranged with [facility], see AVS, per Pt preferences from choice list provided  The Pt's current course of Tx & post DC goals of care have been shared with Post-acute care service providers  Pt was provided with a paper script to get a rollator at the place & time of his preference; discussed with PT & spouse

## 2023-03-31 NOTE — PROGRESS NOTES
"   03/31/23 1059   Patient Data   Rehab Impairment Per EMR:  69 y o  male admitted on 3/20/2023 for L sided weakness in setting of new R frontal lobe brain mass and RUL lung mass  CTA head and neck w/ and w/o contrast was initially done, which showed \"Right frontal lobe cystic lesion with surrounding vasogenic edema most consistent with metastatic disease given findings described below  Local mass effect on the right lateral ventricle without significant midline shift  Right upper lobe mass with partially imaged right hilar adenopathy\"  MRI brain confirmed the mass  Patient was started on decadron and keppra  CT chest/abdomen/pelvis was done to identify primary tumor, which showed \"3 8 x 3 5 cm right upper lobe lung mass with associated overlying pleural tag and adjacent to pleural thickening, this may be due to pleural reaction or mild pleural invasion  No contralateral lung nodules or mass  Right hilar lymphadenopathy\"  There was no evidence of metastasis elsewhere  A multidisciplinary team was consulted for management, including neurosurgery, neurology, pulmonology, and hematology/oncology  Neurology worked the patient up for embolic stroke given incidental small focus of anterior frontal acute to subacute ischemia on CTA but workup was negative  Neurology recommended telemetry monitoring and starting a statin given CVA history, so patient was started on atorvastatin 40 mg  Hematology/oncology recommended a whole body scan, which was negative, and follow up outpatient  Pulmonology felt that it was okay to hold off on intervention of lung mass until brain mass was biopsied and resected given patient's acute L sided weakness and necrosis and edema in the brain  Neurosurgery recommended resection of R frontal lobe mass, and patient was taken to the OR on 3/23/23 for resection of mass  Patient had no complications during surgery but urology had to be consulted for difficult Mcclelland insertion   Patient was placed in ICU " for 24 hours postop for monitoring  Patient did well postop and was downgraded to stepdown sooner than 24 hours  Etiologic Diagnosis Rehab Diagnosis: Impairment of mobility, safety and Activities of Daily Living (ADLs) due to Brain Dysfunction:  02 1  Non-Traumatic  (Etiologic:  Right Frontal Mass)   Support System   Name Víctor Bates - spouse   Home Setup   Type of Home   (Unclear which home he will discharge to, however, in both instances, will need to negotiate FF of stairs  Baseline independent, no AD  Rides bike > 10 miles  Completes own ADLs and IADLs  )   Prior Level of Function   Indoor-Mobility (Ambulation) 3  Independent - Patient completed the activities by him/herself, with or without an assistive device, with no assistance from a helper  Stairs 3  Independent - Patient completed the activities by him/herself, with or without an assistive device, with no assistance from a helper  Falls in the Last Year   Number of falls in the past 12 months 1   Type of Injury Associated with Fall   (Triggered medical attention)   Restrictions/Precautions   Precautions Bed/chair alarms; Fall Risk;Seizure  (Incision C/D/I)   Pain Assessment   Pain Assessment Tool 0-10   Pain Score No Pain   Transfer Bed/Chair/Wheelchair   Positioning Concerns   (Abnormal tone UE/LE on left)   Limitations Noted In Balance; Coordination; Endurance;Sensation; Sequencing;UE Strength;LE Strength   Adaptive Equipment Roller Walker  (Trek poles)   Findings Mild hypertonicity of left UE/LE during functional tasks   Type of Assistance Needed Physical assistance   Physical Assistance Level 26%-50%   Chair/Bed-to-Chair Transfer CARE Score 3   Roll Left and Right   Type of Assistance Needed Independent   Roll Left and Right CARE Score 6   Sit to Lying   Type of Assistance Needed Supervision   Sit to Lying CARE Score 4   Lying to Sitting on Side of Bed   Type of Assistance Needed Supervision   Lying to Sitting on Side of Bed CARE Score 4   Sit to Stand   Type of Assistance Needed Incidental touching   Physical Assistance Level 25% or less   Comment CGA/min A, cues for push to stand   Sit to Stand CARE Score 3   Car Transfer   Reason if not Attempted Environmental limitations   Car Transfer CARE Score 10   Ambulation   Primary Mode of Locomotion Prior to Admission Walk;Wheelchair   Distance Walked (feet) 75 ft   Assist Device Roller Walker   Gait Pattern Ataxic; Inconsistant [place]   Limitations Noted In Balance; Coordination;Device Management; Endurance; Sequencing;Speed   Provided Assistance with: Balance;Limb Stabilization   Walk Assist Level Minimum Assist   Findings Cues for step length, step symmetry   Walk 10 Feet   Type of Assistance Needed Physical assistance   Physical Assistance Level 25% or less   Comment RW   Walk 10 Feet CARE Score 3   Walk 50 Feet with Two Turns   Type of Assistance Needed Physical assistance   Physical Assistance Level 51%-75%   Comment RW with min A; trek poles - mod A   Walk 50 Feet with Two Turns CARE Score 2   Walking 10 Feet on Uneven Surfaces   Reason if not Attempted Safety concerns   Walking 10 Feet on Uneven Surfaces CARE Score 88   Wheelchair mobility   Type of Wheelchair Used 1  Manual   Method Right upper extremity; Left upper extremity;Right lower extremity; Left lower extremity   Assistance Provided For Obstacles;Remove Leg Rest;Replace Leg Rest;Remove armrests;Replace armrests   Distance Level Surface (feet) 125 ft   Distance Wheeled 3% Grade 0 ft   Findings Difficulty with coordination of UE/LEs   Wheel 50 Feet with Two Turns   Type of Assistance Needed Supervision   Physical Assistance Level No physical assistance   Comment VCs for sequence   Wheel 50 Feet with Two Turns CARE Score 4   Wheel 150 Feet   Comment Fatigue   Curb or Single Stair   Reason if not Attempted Safety concerns   1 Step (Curb) CARE Score 88   4 Steps   Reason if not Attempted Safety concerns   4 Steps CARE Score 88   12 Steps   Reason if not Attempted Safety concerns   12 Steps CARE Score 88   RLE Assessment   RLE Assessment WFL   Strength RLE   R Hip Flexion 4+/5   R Knee Flexion 4/5   R Knee Extension 4+/5   R Ankle Dorsiflexion 4+/5   R Ankle Plantar Flexion 4+/5   Strength LLE   L Hip Flexion 4/5   L Knee Flexion 4/5   L Knee Extension 4/5   L Ankle Dorsiflexion 4-/5   L Ankle Plantar Flexion 4-/5   Coordination   Movements are Fluid and Coordinated 0   Coordination and Movement Description Left sided hypertonicity left UE and LE   Sensation   Light Touch   (LT intact, however, intermittently hypersensitive sole of foot on left)   Cognition   Overall Cognitive Status WFL   Vision   Vision Comments Possible left sided inattention with functional activity, will continue to assess   Therapeutic Exercise   Therapeutic Exercise/Activity Seated LE TE - HEP   Discharge Information   Vocational Plan Return to work   Patient's Discharge Plan To return home with wife   Patient's Rehab Expectations To walk better, return to riding bike, return home   Impressions Pt is 69year old male seen for PT evaluation on 3/31/2023 s/p admit to [facility] on 3/30/2023 w/ brain mass with left sided deficits  Orders placed at admission  Performed at least 2 patient identifiers during session: Name and wristband  Comorbidities affecting pt's physical performance at time of assessment include: HTN, prostate CA and resection, CVA in 2011, h/o rectal bleed  LOF prior to admission was independent, (+) , no AD  Personal factors affecting pt at time of IE include: weakness, hypertonicity, apparent mild left sided inattention, decreased balance, endurance  Please find objective findings from PT assessment regarding body systems outlined above with impairments and limitations including weakness, impaired balance, decreased endurance, pain, decreased activity tolerance and fall risk, as well as mobility assessment    Pt's clinical presentation warrants participation in multidisciplinary acute rehab program at 3 or greater hours of therapy per day  Pt to benefit from continued PT tx to address deficits as defined above and maximize level of functional independent mobility and consistency  PT for improved safe return home with maximized functional mobility  PT Therapy Minutes   PT Time In 1100   PT Time Out 1300   PT Total Time (minutes) 120   PT Mode of treatment - Individual (minutes) 90   PT Mode of treatment - Concurrent (minutes) 0   PT Mode of treatment - Group (minutes) 0   PT Mode of treatment - Co-treat (minutes) 0   PT Mode of Treatment - Total time(minutes) 90 minutes   PT Cumulative Minutes 90   Cumulative Minutes   Cumulative therapy minutes 120     HEP dispensed    Patient remains OOB in chair, all needs in reach  Alarm in place and activated  Encouraged use of call bell, patient verbalizes understanding

## 2023-03-31 NOTE — PROGRESS NOTES
03/31/23 0714   Patient Data   Rehab Impairment Impairment of mobility, safety and Activities of Daily Living (ADLs) due to Brain Dysfunction:  02 1  Non-Traumatic   Etiologic Diagnosis Right frontal brain metastasis , PMH significant for HTN, prostate cancer and CVA in 2011   Date of Onset 03/19/23   Support System   Name Patrick Muñoz   Relationship Wife   Home Setup   Type of Home Multi Level   Method of Entry Curb   Number of Stairs in Home 10   First Floor Bathroom Full; Shower   First Floor Bathroom Accessibility Shower chair   Second Floor Bathroom Full;Combo   Available Equipment Single [place]   Baseline Information   Transportation    Prior IADL Participation   Money Management Identify Money;Estimate Costs;Estimate Change;Manage Checkbook; [employer] Preparation Full Participation   Laundry Full Participation   Home Cleaning Full Participation   Prior Level of Function   Self-Care 3  Independent - Patient completed the activities by him/herself, with or without an assistive device, with no assistance from a helper  Functional Cognition 3  Independent - Patient completed the activities by him/herself, with or without an assistive device, with no assistance from a helper  Falls in the Last Year   Number of falls in the past 12 months 1   Type of Injury Associated with Fall No injury   Restrictions/Precautions   Precautions Bed/chair alarms; Fall Risk;Seizure   Weight Bearing Restrictions No   ROM Restrictions No   Pain Assessment   Pain Assessment Tool 0-10   Pain Score No Pain   Eating Assessment   Food To Mouth Yes   Able To Cut Yes   Positioning Out of Bed;Upright   Meal Assessed Breakfast   Opens Packages Yes   Type of Assistance Needed Supervision   Eating CARE Score 4   Oral Hygiene   Type of Assistance Needed Supervision   Oral Hygiene CARE Score 4   Grooming   Able To Initiate Tasks;Comb/Brush Hair;Wash/Dry Face;Brush/Clean Teeth;Wash/Dry Hands   Limitation Noted In Safety;Strength   Findings supervision   Tub/Shower Transfer   Limitations Noted In Balance; Endurance;Problem Solving;ROM;Safety;LE Strength   Adaptive Equipment Grab Bars;Transfer Bench   Assessed Shower   Findings CGA walk in   Shower/Bathe Self   Type of Assistance Needed Physical assistance   Physical Assistance Level 25% or less   Shower/Bathe Self CARE Score 3   Bathing   Assessed Bath Style Shower   Anticipated D/C Bath Style Shower;Sponge Bath   Able to Demetrius Wilmer No   Able to [employer] Temperature No   Able to Wash/Rinse/Dry (body part) Left Arm;Right Arm;L Upper Leg;R Upper Leg;L Lower Leg/Foot;R Lower Leg/Foot;Chest;Abdomen;Perineal Area   Limitations Noted in Balance; Coordination; Endurance;Problem Solving;ROM;Safety;Strength   Positioning Seated;Standing   Adaptive Equipment Tub Bench; Shower [employer]   Remove UB Clothes Pullover Shirt   Don UB Clothes Pullover Shirt   Type of Assistance Needed Supervision   Upper Body Dressing CARE Score 4   Remove LB Clothes Pants; Undergarment;Socks   Don LB Clothes Pants; Undergarment;Socks   Type of Assistance Needed Physical assistance   Physical Assistance Level 25% or less   Lower Body Dressing CARE Score 3   Limitations Noted In Balance; Endurance;Problem Solving; Safety;Strength;ROM   Positioning Supported Sit;Standing   Putting On/Taking Off Footwear   Type of Assistance Needed Physical assistance   Physical Assistance Level 76% or more   Putting On/Taking Off Footwear CARE Score 2   Lying to Sitting on Side of Bed   Type of Assistance Needed Supervision   Lying to Sitting on Side of Bed CARE Score 4   Sit to Stand   Type of Assistance Needed Incidental touching   Sit to Stand CARE Score 4   Picking Up Object   Reason if not Attempted Safety concerns   Picking Up Object CARE Score 88   Comprehension   Comprehension (FIM) 6 - Understands complex/abstract but requires  glasses for visual comp   Expression "  Expression (FIM) 6 - Expresses complex/abstract but requires:  more time   Social Interaction   Social Interaction (FIM) 6 - Interacts appropriately with others BUT requires extra  time   Problem Solving   Problem solving (FIM) 5 - Solves basic problems 90% of time   Memory   Memory (FIM) 6 - Recognizes with extra time   RUE Assessment   RUE Assessment WFL  (MMT sh to hand 4/5 grossly)   LUE Assessment   LUE Assessment WFL  (MMT 4-/5 grossly sh to hand)   Coordination   Movements are Fluid and Coordinated 1   Sensation   Light Touch No apparent deficits   Propioception No apparent deficits   Cognition   Overall Cognitive Status WFL   Arousal/Participation Alert; Responsive; Cooperative   Attention Within functional limits   Orientation Level Oriented X4   Memory Within functional limits   Following Commands Follows all commands and directions without difficulty   Discharge Information   Vocational Plan Return to work  (artist, 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

## 2023-03-31 NOTE — NUTRITION
23 1407   Biochemical Data,Medical Tests, and Procedures   Biochemical Data/Medical Tests/Procedures Lab values reviewed; Meds reviewed   Labs (Comment) 3/31 Na:134, B, Ca:8 3, pro:6 3, WBC:15 10  3/22 A1c:5 9%   Meds (Comment) norvasc, lipitor, decadron, colace, lovenox, cozaar, zofran, protonix   Nutrition-Focused Physical Exam   Nutrition-Focused Physical Exam Findings RN skin assessment reviewed  (Wound right head per documentation )   Nutrition-Focused Physical Exam Findings +1 RLE edema  Trace LLE edema  Left sided weakness (right hand dominant)  LBM 3/30  Indwelling catheter present  S/p right frontal craniotomy 3/23  Medical-Related Concerns HTN, prostate cancer, CVA   Adequacy of Intake   Nutrition Modality PO   Feeding Route   PO Independent   Current PO Intake   Current Diet Order Regular diet thin liquids   Current Meal Intake %   Estimated calorie intake compared to estimated need Nutrient needs met  PES Statement   Problem No nutrition diagnosis   Recommendations/Interventions   Malnutrition/BMI Present No  (does not meet criteria)   Summary Speech  Regular diet thin liquids  Meal completions 100%  Spouse Bossman present during visit  Patient states his appetite is good  He reports consuming 3 meals per day  He states he avoids using added salt and processed foods  He states he enjoys foods such as cereal, eggs, sharp, ham, pasta, fish, chicken, chili, pork, beef, salads, unsalted peanuts and chips  He states he does have a sweet tooth and enjoys ice cream, white chocolate and peeps  They report grilling in the summer  3/31/#; #; 3/21/#; weight stable, no significant weight change  Wound right head per documentation  +1 RLE edema  Trace LLE edema  Left sided weakness (right hand dominant)  LBM 3/30  Indwelling catheter present  S/p right frontal craniotomy 3/23  Diet education provided     Interventions/Recommendations Continue current diet order Education Assessment   Education Education initiated/ completed   Education Notes Provided CHO Counting for People with DM and Low Sodium Nutrition therapy handouts  Reviewed label reading, portion sizes, balanced meals and foods/beverages containing CHO & sugar     Nutrition Complexity Risk   Nutrition complexity level Low risk   Follow up date 04/10/23

## 2023-03-31 NOTE — PROGRESS NOTES
Physical Medicine and Rehabilitation Progress Note  Pattie Santos 69 y o  male MRN: [id number]  Unit/Bed#: -01 Encounter: [id number]    HPI:  69 y o  male with a PMH significant for HTN, prostate cancer and CVA in 2011, who presented to the [address] on 03/19/23 with one week of progressive left sided weakness  CTA with right frontal lobe cystic lesion with surrounding vasogenic edema consistent with metastatic disease  Local mass effect on the right lateral ventricle without significant midline shift  Right upper lobe mass with partially imaged right hilar adenopathy  MRI confirmed  Neurology and neurosurgery consulted  Patient initiated on Keppra and Decadron with plans for surgical resection within the week  Pulmonology consulted with plans for tissue biopsy after neurosurgery  Hem/Onc consulted and recommended outpatient follow up for definitive treatment plan  A full body scan was obtained on 3/22 which showed no metastatic disease  Pt taken to the OR with Dr Torrey Gamez on 03/23 and is s/p right frontal craniotomy  Intraop, patient had melton catheter placement that was difficulty  Keppra ordered post-op for one week for seizure ppx  Decadron taper continued for cerebral edema  Aspirin on hold for at least 2 weeks post-op  Urology consulted and recommended leaving in place for approximately seven days with removal at rehab and eventual follow up as an outpatient  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  Patient evaluated by PT/OT and deemed appropriate for post-acute rehabilitation services  He was accepted to [facility] and arrived on 3/30/23  Subjective: No complaints in good spirits anxious to have therapy and get home    ROS: A 10 point ROS was performed; negative except as noted above  Assessment/Plan:   Melton catheter in place  Assessment & Plan  • Melton with difficult insertion intraop x7 days now   • Reached out to Urology to see if appropriate for us to remove in [facility]   • Urology would like removal in AM and voiding trial   • If needs to be reinserted and difficulty, urology recommended sending patient to ED for insertion       Lung nodule  Assessment & Plan  • Noted on imaging   • Has follow up with hem/onc 4/5 for definitive treatment plan     Cerebral edema (HCC)  Assessment & Plan  • Continue Decadron taper      Brain compression (HCC)  Assessment & Plan  • Plan under brain mass    Hypercholesterolemia  Assessment & Plan  • Continue Lipitor 40 mg daily after dinner     Essential hypertension  Assessment & Plan  • Monitor vitals  • Continue Cozaar 50 mg daily and Norvasc 10 mg daily     * Brain mass  Assessment & Plan  • Presented with one week of left sided weakness   • CTA with right frontal lobe cystic lesion with surrounding vasogenic edema consistent with metastatic ds  Local mass effect on the right alteral ventricle without significant midline shift  Right upper lobe mass with partially imaged right hilar adenopathy  • S/p Right frontal Craniotomy image-guided for tumor resection with Dr Lory Caal on 3/23/23  • POD #7   • Continue Keppra 500 mg Q12H x1 week for seizure ppx   • Continue Decadron taper for associated cerebral edema   • Continue to hold Aspirin for at least 2 weeks per neurosurgery   • Will need hem/onc follow up with concern from lung primary disease with RUL mass   • Hem/onc [facility] on 4/5   • Neurosurgery [facility] on 4/6 can be cancelled   Originally for staple removal  Neurosurgery ok for us to remove here in [facility], need to send photo of incision site prior to removal  Pt will follow up with Neurosurgery in May  • Acute chomprehensive interdisciplinary inpatient rehabilitation to include intensive skilled therapies as outlines with oversight and management by a rehabilitation Physician Assistant overseen by rehabilitation physician as well as inpatient rehabilitation nursing, case management and weekly interdisciplinary team meeting              Scheduled Meds:  Current Facility-Administered Medications   Medication Dose Route Frequency Provider Last Rate   • acetaminophen  975 mg Oral Q8H PRN Redd Izquierdo PA-C     • amLODIPine  10 mg Oral Daily Redd Izquierdo PA-C     • atorvastatin  40 mg Oral After Antwan Huang PA-C     • bisacodyl  10 mg Rectal Daily PRN Redd Izquierdo PA-C     • dexamethasone  2 mg Oral [facility] Redd Izquierdo PA-C      Followed by   • dexamethasone  2 mg Oral Q12H [address] Redd Izquierdo PA-C      Followed by   • [START ON 4/3/2023] dexamethasone  2 mg Oral Q24H [address] Redd Izquierdo PA-C     • docusate sodium  100 mg Oral BID Redd Izquierdo PA-C     • enoxaparin  40 mg Subcutaneous Daily Redd Izquierdo PA-C     • losartan  50 mg Oral Daily Redd Izquierdo PA-C     • melatonin  3 mg Oral HS Redd Izquierdo PA-C     • ondansetron  4 mg Oral Q6H PRN Redd Izquierdo PA-C     • oxyCODONE  5 mg Oral Q4H PRN Redd Izquierdo PA-C     • oxyCODONE  2 5 mg Oral Q4H PRN Redd Izquierdo PA-C     • pantoprazole  40 mg Oral Early Morning Redd Izquierdo PA-C     • senna  2 tablet Oral Daily Redd Izquierdo PA-C         Objective:    Functional Update:  Mobility: Minimal assistance  Transfers: Minimal assistance  ADLs: Minimal assistance except for lower body dressing moderate assistance          Physical Exam:  Temp:  [97 7 °F (36 5 °C)-98 8 °F (37 1 °C)] 97 7 °F (36 5 °C)  HR:  [53-69] 53  Resp:  [18-20] 18  BP: (157-176)/(72-96) 176/96  SpO2:  [96 %-97 %] 96 %    General:   Constitutional:       General: He is not in acute distress  Appearance: He is not ill-appearing  HENT:      Head: Normocephalic  Comments: Incision site to scalp C/D/I  Eyes:      Extraocular Movements: Extraocular movements intact  Pupils: Pupils are equal, round, and reactive to light  Cardiovascular:      Rate and Rhythm: Normal rate and regular rhythm  Pulses: Normal pulses  Heart sounds: Normal heart sounds   No murmur heard   Pulmonary:      Effort: Pulmonary effort is normal  No respiratory distress  Breath sounds: Normal breath sounds  Abdominal:      General: Bowel sounds are normal  There is distension  Tenderness: There is no abdominal tenderness  Musculoskeletal:      Right lower leg: No edema  Left lower leg: No edema  Skin:     General: Skin is warm and dry  Neurological:      Mental Status: He is alert and oriented to person, place, and time  Motor: Weakness present  Comments: Right sided weakness (UE>LE)    Psychiatric:         Mood and Affect: Mood normal          Behavior: Behavior normal            Diagnostic Studies:   No orders to display       Laboratory: Labs reviewed  Results from last 7 days   Lab Units 03/31/23  0507 03/29/23  0519 03/26/23  0442   HEMOGLOBIN g/dL 13 9 13 5 13 4   HEMATOCRIT % 42 8 41 7 39 4   WBC Thousand/uL 15 10* 12 14* 13 07*     Results from last 7 days   Lab Units 03/31/23  0506 03/29/23  0519 03/26/23  0442   BUN mg/dL 18 20 18   SODIUM mmol/L 134* 137 135   POTASSIUM mmol/L 4 1 4 1 4 0   CHLORIDE mmol/L 102 108 107   CREATININE mg/dL 0 69 0 68 0 67   AST U/L 22  --   --    ALT U/L 21  --   --             ** Please Note: Fluency Direct voice to text software may have been used in the creation of this document   **

## 2023-03-31 NOTE — TELEPHONE ENCOUNTER
MRI BRAIN W/WO WAS ENTERED & SCHEDULED BY RN: Abigail De Jesus     4/6/23 AT [facility]

## 2023-03-31 NOTE — TELEPHONE ENCOUNTER
Patient in Patient and per notes Order for melton to be discontinued in a m  and voiding trial started   While in facility

## 2023-03-31 NOTE — PROGRESS NOTES
"[facility] INITIAL EVALUATION   03/31/23 1030   Patient Data   Rehab Impairment History of Present Illness chart review:  \" Ny Dubois is a 69 y o  male with a PMH significant for HTN, prostate cancer and CVA in 2011, who presented to the [facility] on 03/19/23 with one week of progressive left sided weakness  CTA with right frontal lobe cystic lesion with surrounding vasogenic edema consistent with metastatic disease  Local mass effect on the right lateral ventricle without significant midline shift  Right upper lobe mass with partially imaged right hilar adenopathy  MRI confirmed  Neurology and neurosurgery consulted  Patient initiated on Keppra and Decadron with plans for surgical resection within the week  Pulmonology consulted with plans for tissue biopsy after neurosurgery  Hem/Onc consulted and recommended outpatient follow up for definitive treatment plan  A full body scan was obtained on 3/22 which showed no metastatic disease  Pt taken to the OR with Dr Joel Noel on 03/23 and is s/p right frontal craniotomy  Intraop, patient had melton catheter placement that was difficulty  Keppra ordered post-op for one week for seizure ppx  Decadron taper continued for cerebral edema  Aspirin on hold for at least 2 weeks post-op  Urology consulted and recommended leaving in place for approximately seven days with removal at rehab and eventual follow up as an outpatient  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  Patient evaluated by PT/OT and deemed appropriate for post-acute rehabilitation services  He was accepted to [facility] and arrived on 3/30/23  \"   Support System   Name Katherine Calderon   Relationship wife   Baseline Information   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)   Transportation    Psychosocial   Psychosocial (WDL) WDL   Restrictions/Precautions   Precautions " Bed/chair alarms; Fall Risk;Seizure   Pain Assessment   Pain Assessment Tool 0-10   Pain Score No Pain   Eating Assessment   Swallow Precautions   (Pt denies any difficulty swallowing, no history known to this service and no difficulty reported by staff)   Type of Assistance Needed Independent   Physical Assistance Level No physical assistance   Eating CARE Score 6     Cognitive Linguisitic Assessments   Cognitive Linquistic Quick Test (CLQT) RBANS assessment started, will be completed next session   Comprehension   Assist Devices Glasses  (Some hearing loss exposure to load noises in the past  Not affecting his life and no new changes)   Memory Skills   Orientation Level Oriented X4   Motor Speech Evaluation   Vocal Quality WFL   Dysarthria No   Intelligibility   (100%)   Apraxia None present   Swallow Information   Current Diet Regular; Thin liquid   Baseline Diet Regular; Thin liquids      Comprehension   Assist Devices Glasses  (Some hearing loss exposure to load noises in the past  Not affecting his life and no new changes)   Cognition   Overall Cognitive Status WFL   Vision   Vision Comments pt denies any new changes   Discharge Information   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

## 2023-03-31 NOTE — PROGRESS NOTES
03/31/23 1315   Pain Assessment   Pain Assessment Tool 0-10   Pain Score No Pain   Restrictions/Precautions   Precautions Bed/chair alarms;Aspiration; Fall Risk;Seizure   Weight Bearing Restrictions No   ROM Restrictions No   Sit to Stand   Type of Assistance Needed Incidental touching   Sit to Stand CARE Score 4   Bed-Chair Transfer   Type of Assistance Needed Incidental touching   Chair/Bed-to-Chair Transfer CARE Score 4   Exercise Tools   Exercise Tools Yes   UE Ergometer 5 minutes front and backwards BUE with no resistance   Hand Gripper gripper with pegs LUE following retrieval of pegs fromt he floor using the reacher in the RUE   Other Exercise Tool 1 card match reachign with BUE while seated dividing attention and staying on task without difficulty   Additional Activities   Additional Activities Other (Comment)   Additional Activities Comments fxl mobility with RW to rom from OT room CGA   Other Comments   Assessment Pt participates in 30 minutes concurrent treatment during BUE therex focusing on common goals of increasing stregnth and activity tolerance with simialr goals as another patient   Assessment   Treatment Assessment Pt presents directly from PT agreeable to OT session including BUE therex and theract  Pt tolerates session without complaints and is making gains towards goals although does report fatigue by end of session  Pt will benefit from continued skilled oT services to increase independence with daily tasks  Problem List Decreased strength;Decreased range of motion;Decreased endurance; Impaired balance;Decreased safety awareness   Plan   Treatment/Interventions ADL retraining;Functional transfer training; Endurance training; Therapeutic exercise;Patient/family training;Equipment eval/education; Compensatory technique education   Progress Progressing toward goals   OT Therapy Minutes   OT Time In 1315   OT Time Out 1345   OT Total Time (minutes) 30   OT Mode of treatment - Concurrent (minutes) 30   Therapy Time missed   Time missed?  No

## 2023-03-31 NOTE — NURSING NOTE
Patient resting comfortably in bed at this time  No signs of distress noted  Patient was able to stand pivot transfer with one assist and a walker overnight  Bed alarm in place for patient safety  Mccle[place]and catheter removed this morning to begin a voiding trial   Incision to the scalp is stapled and open to air no drainage noted  SCDs as ordered for DVT prevention  Call bell within reach  Will continue to monitor patient and follow plan of care

## 2023-04-01 LAB
GLUCOSE SERPL-MCNC: 117 MG/DL (ref 65–140)
GLUCOSE SERPL-MCNC: 83 MG/DL (ref 65–140)
GLUCOSE SERPL-MCNC: 94 MG/DL (ref 65–140)
GLUCOSE SERPL-MCNC: 99 MG/DL (ref 65–140)

## 2023-04-01 RX ADMIN — DOCUSATE SODIUM 100 MG: 100 CAPSULE, LIQUID FILLED ORAL at 17:33

## 2023-04-01 RX ADMIN — DEXAMETHASONE 2 MG: 0.5 TABLET ORAL at 09:17

## 2023-04-01 RX ADMIN — ENOXAPARIN SODIUM 40 MG: 40 INJECTION SUBCUTANEOUS at 09:17

## 2023-04-01 RX ADMIN — LOSARTAN POTASSIUM 50 MG: 50 TABLET, FILM COATED ORAL at 09:17

## 2023-04-01 RX ADMIN — Medication 3 MG: at 21:03

## 2023-04-01 RX ADMIN — DEXAMETHASONE 2 MG: 0.5 TABLET ORAL at 21:04

## 2023-04-01 RX ADMIN — AMLODIPINE BESYLATE 10 MG: 10 TABLET ORAL at 09:17

## 2023-04-01 RX ADMIN — SENNOSIDES 17.2 MG: 8.6 TABLET, FILM COATED ORAL at 09:17

## 2023-04-01 RX ADMIN — ATORVASTATIN CALCIUM 40 MG: 40 TABLET, FILM COATED ORAL at 17:33

## 2023-04-01 RX ADMIN — DOCUSATE SODIUM 100 MG: 100 CAPSULE, LIQUID FILLED ORAL at 09:17

## 2023-04-01 RX ADMIN — PANTOPRAZOLE SODIUM 40 MG: 40 TABLET, DELAYED RELEASE ORAL at 05:41

## 2023-04-01 NOTE — PROGRESS NOTES
04/01/23 1030   Pain Assessment   Pain Assessment Tool 0-10   Pain Score 1   Pain Location/Orientation Location: Head  (headache)   Restrictions/Precautions   Precautions Aspiration;Bed/chair alarms; Fall Risk;Seizure   Weight Bearing Restrictions No   ROM Restrictions No   Sit to Stand   Type of Assistance Needed Incidental touching   Comment CG   Sit to Stand CARE Score 4   Functional Standing Tolerance   Time approx 15min during popsicle stick activity and blood sugar check with CNA   Comments pt used one UE for unilateral support on table, overall good dynamic standing balance with fatigue noted closer to end of trial   Exercise Tools   UE Ergometer 5min forward, 5min backwards   Theraputty searched for and retrieved small objects in yellow theraputty, removing objects with L pinch    Other Exercise Tool 1 amanda pegboard with L hand focusing on fine motor control and endurance, pt reported fatigue near end of activity with noticeably slower movements   Other Exercise Tool 2 games [employer]inj[employer] with OT focusing on L hand fine and gross motor control, sustained attention, working memory, problem solving; very occasional VC's required to correct errors or reinforce more complicated rules of game   Cognition   Overall Cognitive Status [facility]   Arousal/Participation Alert; Responsive; Cooperative   Attention Within functional limits   Orientation Level Oriented X4   Memory Within functional limits   Following Commands Follows all commands and directions without difficulty   Assessment   Treatment Assessment Pt agreeable to OT session this AM  Received in recliner chair  Pt completed fxl mobility to OT room using RW with CG/supervision  Pt completed ROM/strength, standing tolerance, fine motor, and cognitive activities; details in respective sections   Pt tolerated all activities well; did report onset of fatigue in L hand after multiple L hand coordination-focused activities; noticeable bradykinesia and impaired coordination once fatigue occurred, alleviated with rest breaks  Pt an eager and active participant in therapy and is motivated to continue working  Pt continues with barriers to d/c of decreased strength throughout but especially LUE s/p brain mass resection, decreased balance, impaired coordination L side, mildly impaired safety awareness and attention, and decreased activity tolerance; all affect independence in self care and fxl transfers  Pt would benefit from continued skilled OT services in order to address listed barriers and prepare for safe d/c  Prognosis Good   Problem List Decreased strength;Decreased endurance; Impaired balance;Decreased coordination;Decreased safety awareness   Plan   Treatment/Interventions ADL retraining;Functional transfer training;LE strengthening/ROM; Therapeutic exercise; Endurance training;Patient/family training;Equipment eval/education; Compensatory technique education;OT   Progress Progressing toward goals   OT Therapy Minutes   OT Time In 1030   OT Time Out 1200   OT Total Time (minutes) 90   OT Mode of treatment - Individual (minutes) 54   OT Mode of treatment - Concurrent (minutes) 36

## 2023-04-01 NOTE — PROGRESS NOTES
04/01/23 0700   Pain Assessment   Pain Assessment Tool 0-10   Pain Score No Pain   Patient's Stated Pain Goal No pain   Restrictions/Precautions   Precautions Bed/chair alarms;Aspiration; Fall Risk;Seizure   Weight Bearing Restrictions No   ROM Restrictions No   Cognition   Overall Cognitive Status WFL   Arousal/Participation Alert; Responsive; Cooperative   Attention Within functional limits   Orientation Level Oriented X4   Memory Within functional limits   Following Commands Follows all commands and directions without difficulty   Comments pt sitting upright in chair;   Subjective   Subjective pt agreeable to PT   Sit to Stand   Type of Assistance Needed Incidental touching   Physical Assistance Level 25% or less   Comment CGA   Sit to Stand CARE Score 3   Bed-Chair Transfer   Type of Assistance Needed Physical assistance   Physical Assistance Level 26%-50%   Comment min A   Chair/Bed-to-Chair Transfer CARE Score 3   Transfer Bed/Chair/Wheelchair   Limitations Noted In Balance;Confidence; Coordination; Endurance;UE Strength;LE Strength   Adaptive Equipment None   Stand Pivot Minimal Assist;Assist x 1   Sit to Stand Contact Guard   Stand to [employer] Transfer   Reason if not Attempted Environmental limitations   Car Transfer CARE Score 10   Walk 10 Feet   Type of Assistance Needed Physical assistance   Physical Assistance Level 26%-50%   Comment no AD   Walk 10 Feet CARE Score 3   Walk 50 Feet with Two Turns   Type of Assistance Needed Physical assistance   Physical Assistance Level 26%-50%   Comment no AD   Walk 50 Feet with Two Turns CARE Score 3   Walk 150 Feet   Type of Assistance Needed Physical assistance   Physical Assistance Level 25% or less   Comment RW   Walk 150 Feet CARE Score 3   Walking 10 Feet on Uneven Surfaces   Reason if not Attempted Safety concerns   Walking 10 Feet on Uneven Surfaces CARE Score 88   Ambulation   Primary Mode of Locomotion Prior to Admission Walk   [facility] (feet) 125 ft  (125 ft and 75 ft w/out AD; (281 ft with RW))   Assist Device Other  (none)   Gait Pattern Ataxic; Inconsistant Chioma; Slow Chioma; Step through; Improper weight shift   Limitations Noted In Balance; Coordination;Speed; Sequencing; Endurance   Provided Assistance with: Balance;Direction   Walk Assist Level Minimum Assist   Findings vc for increasing snow   Does the patient walk? 2  Yes   Wheel 50 Feet with Two Turns   Reason if not Attempted Activity not applicable   Wheel 50 Feet with Two Turns CARE Score 9   Wheel 150 Feet   Reason if not Attempted Activity not applicable   Wheel 613 Feet CARE Score 9   Wheelchair mobility   Does the patient use a wheelchair? 0  No   Curb or Single Stair   Style negotiated Single stair   Type of Assistance Needed Physical assistance   Physical Assistance Level 26%-50%   Comment min A and b/ l HR   1 Step (Curb) CARE Score 3   4 Steps   Type of Assistance Needed Physical assistance   Physical Assistance Level 26%-50%   Comment min A   4 Steps CARE Score 3   12 Steps   Type of Assistance Needed Physical assistance   Physical Assistance Level 26%-50%   Comment min A   12 Steps CARE Score 3   Stairs   Type Stairs   # of Steps 14   Weight Bearing Precautions Fall Risk   Assist Devices Bilateral Rail   Findings non reciprocal step pattern; b/l HR; min A nad cuing   Picking Up Object   Reason if not Attempted Safety concerns   Picking Up Object CARE Score 88   Therapeutic Interventions   Strengthening seated b/l TE using 1 5# b/l LE;   Balance gait/transfers   Other stairs   Equipment Use   NuStep 5 min at L1 0 using b/l UE/LE   Parallel Bars side stepping left and Right wiht one hand support   Assessment   Treatment Assessment pt sitting in chair ; he transfers sit -stand CGA; ambulates wtih RW x 281 ft wiht CGA; performed up/down 14 steps using b/l HR and non reciprocal step pattern wiht min A; he performed seated TE to b/l LE using 1 5# to each leg    he performed Nu step x 5 min using b/l UE and LE using L 1; he ambulates 175 w/out AD and min A iwth narrow ABHINAV; slight forward flexion; ataxic gait  he performed side stepping in // bars with one ue support; needs cuing for L foot placement and cuing to prevent substitution  he ambulates 76 ft again w/out AD wiht min A and cuing to improve ABHINAV and proprioception  he lacks proprioception and strength and coordination of L LE ; will benefit from skilled PT services to improve L LE function  Problem List Decreased strength;Decreased range of motion;Decreased endurance; Impaired balance;Decreased safety awareness   PT Barriers   Physical Impairment Decreased strength;Decreased range of motion;Decreased endurance; Impaired balance;Decreased safety awareness   Functional Limitation Walking;Transfers;Standing;Stair negotiation   Plan   Treatment/Interventions Functional transfer training;LE strengthening/ROM; Elevations; Therapeutic exercise; Endurance training;Patient/family training;Bed mobility;Gait training   Progress Progressing toward goals   PT Therapy Minutes   PT Time In 0700   PT Time Out 0830   PT Total Time (minutes) 90   PT Mode of treatment - Individual (minutes) 90

## 2023-04-01 NOTE — NURSING NOTE
Patient resting comfortably in recliner chair at this time  No signs of distress noted  Patient was able to stand pivot transfer with one assist and a walker overnight  Bed alarm in place for patient safety  Patient completed his voiding trial successfully this evening  Incision to the scalp is stapled and open to air no drainage noted  SCDs as ordered for DVT prevention  Call bell within reach  Will continue to monitor patient and follow plan of care

## 2023-04-01 NOTE — PLAN OF CARE
Problem: PAIN - ADULT  Goal: Verbalizes/displays adequate comfort level or baseline comfort level  Description: Interventions:  - Encourage patient to monitor pain and request assistance  - Assess pain using appropriate pain scale  - Administer analgesics based on type and severity of pain and evaluate response  - Implement non-pharmacological measures as appropriate and evaluate response  - Consider cultural and social influences on pain and pain management  - Notify physician/advanced practitioner if interventions unsuccessful or patient reports new pain  Outcome: Progressing     Problem: INFECTION - ADULT  Goal: Absence or prevention of progression during hospitalization  Description: INTERVENTIONS:  - Assess and monitor for signs and symptoms of infection  - Monitor lab/diagnostic results  - Monitor all insertion sites, i e  indwelling lines, tubes, and drains  - Monitor endotracheal if appropriate and nasal secretions for changes in amount and color  - [place] appropriate cooling/warming therapies per order  - Administer medications as ordered  - Instruct and encourage patient and family to use good hand hygiene technique  - Identify and instruct in appropriate isolation precautions for identified infection/condition  Outcome: Progressing     Problem: SAFETY ADULT  Goal: Maintain or return to baseline ADL function  Description: INTERVENTIONS:  -  Assess patient's ability to carry out ADLs; assess patient's baseline for ADL function and identify physical deficits which impact ability to perform ADLs (bathing, care of mouth/teeth, toileting, grooming, dressing, etc )  - Assess/evaluate cause of self-care deficits   - Assess range of motion  - Assess patient's mobility; develop plan if impaired  - Assess patient's need for assistive devices and provide as appropriate  - Encourage maximum independence but intervene and supervise when necessary  - Involve family in performance of ADLs  - Assess for home care needs following discharge   - Consider OT consult to assist with ADL evaluation and planning for discharge  - Provide patient education as appropriate  Outcome: Progressing

## 2023-04-01 NOTE — NURSING NOTE
Patient ambulates in room contact guard with walker  Voices no complaints of pain  Left side remains weak  Voices no complaints of pain  Incision intact to top of head  Sitting up in recliner throughout shift  Needs no assistance for bathroom hygiene  Will continue to monitor and follow plan of care

## 2023-04-01 NOTE — PLAN OF CARE
Problem: PAIN - ADULT  Goal: Verbalizes/displays adequate comfort level or baseline comfort level  Description: Interventions:  - Encourage patient to monitor pain and request assistance  - Assess pain using appropriate pain scale  - Administer analgesics based on type and severity of pain and evaluate response  - Implement non-pharmacological measures as appropriate and evaluate response  - Consider cultural and social influences on pain and pain management  - Notify physician/advanced practitioner if interventions unsuccessful or patient reports new pain  Outcome: Progressing     Problem: INFECTION - ADULT  Goal: Absence or prevention of progression during hospitalization  Description: INTERVENTIONS:  - Assess and monitor for signs and symptoms of infection  - Monitor lab/diagnostic results  - Monitor all insertion sites, i e  indwelling lines, tubes, and drains  - Monitor endotracheal if appropriate and nasal secretions for changes in amount and color  - [place] appropriate cooling/warming therapies per order  - Administer medications as ordered  - Instruct and encourage patient and family to use good hand hygiene technique  - Identify and instruct in appropriate isolation precautions for identified infection/condition  Outcome: Progressing     Problem: DISCHARGE PLANNING  Goal: Discharge to home or other facility with appropriate resources  Description: INTERVENTIONS:  - Identify barriers to discharge w/patient and caregiver  - Arrange for needed discharge resources and transportation as appropriate  - Identify discharge learning needs (meds, wound care, etc )  - Arrange for interpretive services to assist at discharge as needed  - Refer to Case Management Department for coordinating discharge planning if the patient needs post-hospital services based on physician/advanced practitioner order or complex needs related to functional status, cognitive ability, or social support system  Outcome: Progressing

## 2023-04-02 LAB
GLUCOSE SERPL-MCNC: 103 MG/DL (ref 65–140)
GLUCOSE SERPL-MCNC: 115 MG/DL (ref 65–140)
GLUCOSE SERPL-MCNC: 140 MG/DL (ref 65–140)
GLUCOSE SERPL-MCNC: 80 MG/DL (ref 65–140)

## 2023-04-02 RX ADMIN — DOCUSATE SODIUM 100 MG: 100 CAPSULE, LIQUID FILLED ORAL at 08:34

## 2023-04-02 RX ADMIN — LOSARTAN POTASSIUM 50 MG: 50 TABLET, FILM COATED ORAL at 08:34

## 2023-04-02 RX ADMIN — DOCUSATE SODIUM 100 MG: 100 CAPSULE, LIQUID FILLED ORAL at 17:16

## 2023-04-02 RX ADMIN — ENOXAPARIN SODIUM 40 MG: 40 INJECTION SUBCUTANEOUS at 08:33

## 2023-04-02 RX ADMIN — ATORVASTATIN CALCIUM 40 MG: 40 TABLET, FILM COATED ORAL at 17:16

## 2023-04-02 RX ADMIN — PANTOPRAZOLE SODIUM 40 MG: 40 TABLET, DELAYED RELEASE ORAL at 05:44

## 2023-04-02 RX ADMIN — Medication 3 MG: at 22:03

## 2023-04-02 RX ADMIN — DEXAMETHASONE 2 MG: 0.5 TABLET ORAL at 08:33

## 2023-04-02 RX ADMIN — AMLODIPINE BESYLATE 10 MG: 10 TABLET ORAL at 08:34

## 2023-04-02 RX ADMIN — SENNOSIDES 17.2 MG: 8.6 TABLET, FILM COATED ORAL at 08:34

## 2023-04-02 NOTE — PLAN OF CARE
Problem: SAFETY ADULT  Goal: Patient will remain free of falls  Description: INTERVENTIONS:  - Educate patient/family on patient safety including physical limitations  - Instruct patient to call for assistance with activity   - Consult OT/PT to assist with strengthening/mobility   - Keep Call bell within reach  - Keep bed low and locked with side rails adjusted as appropriate  - Keep care items and personal belongings within reach  - Initiate and maintain comfort rounds  - Make Fall Risk Sign visible to staff  - Apply yellow socks and bracelet for high fall risk patients  - Consider moving patient to room near nurses station  Outcome: Progressing     Problem: Prexisting or High Potential for Compromised Skin Integrity  Goal: Skin integrity is maintained or improved  Description: INTERVENTIONS:  - Identify patients at risk for skin breakdown  - Assess and monitor skin integrity  - Assess and monitor nutrition and hydration status  - Monitor labs   - Assist with mobility/ambulation  - Avoid friction and shearing  - Provide appropriate hygiene as needed including keeping skin clean and dry  - Evaluate need for skin moisturizer/barrier cream  - Collaborate with interdisciplinary team   - Patient/family teaching  - Consider wound care consult   Outcome: Progressing

## 2023-04-02 NOTE — NURSING NOTE
Pt ambulated supervision RW to BR  No c/o pain  Visited w/ friends and family all afternoon  Pt pleasant, currently sitting up in [facility] watching TV  Will continue to monitor and follow plan of care

## 2023-04-02 NOTE — PROGRESS NOTES
04/02/23 1230   Pain Assessment   Pain Assessment Tool 0-10   Pain Score No Pain   Restrictions/Precautions   Precautions Bed/chair alarms; Fall Risk;Seizure   Cognition   Overall Cognitive Status WFL   Orientation Level Oriented X4   Sit to Stand   Type of Assistance Needed Supervision;Verbal cues   Comment close S with RW   Sit to Stand CARE Score 4   Bed-Chair Transfer   Type of Assistance Needed Supervision;Verbal cues   Comment close S with RW   Chair/Bed-to-Chair Transfer CARE Score 4   Walk 10 Feet   Type of Assistance Needed Incidental touching;Verbal cues   Comment level and unlevel surfaces   Walk 10 Feet CARE Score 4   Walk 50 Feet with Two Turns   Type of Assistance Needed Incidental touching;Verbal cues   Comment level and unlevel surfaces   Walk 50 Feet with Two Turns CARE Score 4   Walk 150 Feet   Type of Assistance Needed Incidental touching;Verbal cues   Comment level and unlevel surfaces   Walk 150 Feet CARE Score 4   Walking 10 Feet on Uneven Surfaces   Type of Assistance Needed Incidental touching;Verbal cues   Comment level and unlevel surfaces   Walking 10 Feet on Uneven Surfaces CARE Score 4   Ambulation   Primary Mode of Locomotion Prior to Admission Walk   Distance Walked (feet) 184 ft  (95' 184' 101')   Assist Device Roller Walker   Gait Pattern Ataxic; Inconsistant Chioma   Limitations Noted In Balance; Coordination;Device Management; Endurance; Sequencing;Speed   Provided Assistance with: Balance   Walk Assist Level Contact Guard   Findings level and unlevel surfaces   Does the patient walk? 2  Yes   Curb or Single Stair   Style negotiated Single stair   Type of Assistance Needed Incidental touching;Verbal cues   Comment    1 Step (Curb) CARE Score 4   4 Steps   Type of Assistance Needed Incidental touching;Verbal cues   Comment cg   4 Steps CARE Score 4   12 Steps   Type of Assistance Needed Incidental touching;Verbal cues   Comment cg   12 Steps CARE Score 4   Stairs   Type Stairs; Ramp # of Steps 14   Weight Bearing Precautions WBAT   Assist Devices Single Rail;Roller Walker   Findings cg ramp with RW; steps with single rail   Toilet Transfer   Type of Assistance Needed Supervision;Verbal cues   Comment close S with RW   Toilet Transfer CARE Score 4   Therapeutic Interventions   Strengthening seated ther ex   Balance gait and transfer training   Other ramp and stair negotiation  Assessment   Treatment Assessment Patient agreeable to therapy session  No pain reported  Occasional cues needed for general safety  Making positive gains with therapy  Completed ther ex for general LE strengthening; gait and transfer training focusing on sequence and technique for improved balance and safety with functional mobility using walker  Patient negotiated ramp with RW and steps with single rail CGA with cues  Appropriate for concurrent therapy to increase motivation and encouragement among pts with similar deficits while completing therapy session  PT Barriers   Physical Impairment Decreased strength;Decreased range of motion;Decreased endurance; Impaired balance;Decreased mobility; Decreased safety awareness   Functional Limitation Walking;Transfers;Standing;Stair negotiation;Ramp negotiation   Plan   Treatment/Interventions Functional transfer training;LE strengthening/ROM; Elevations; Therapeutic exercise;Gait training   Progress Progressing toward goals   PT Therapy Minutes   PT Time In 1230   PT Time Out 1330   PT Total Time (minutes) 60   PT Mode of treatment - Individual (minutes) 0   PT Mode of treatment - Concurrent (minutes) 60   PT Mode of treatment - Group (minutes) 0   PT Mode of treatment - Co-treat (minutes) 0   PT Mode of Treatment - Total time(minutes) 60 minutes   PT Cumulative Minutes 150   Therapy Time missed   Time missed?  No

## 2023-04-03 LAB
GLUCOSE SERPL-MCNC: 124 MG/DL (ref 65–140)
GLUCOSE SERPL-MCNC: 84 MG/DL (ref 65–140)

## 2023-04-03 RX ADMIN — AMLODIPINE BESYLATE 10 MG: 10 TABLET ORAL at 09:01

## 2023-04-03 RX ADMIN — DEXAMETHASONE 2 MG: 0.5 TABLET ORAL at 09:01

## 2023-04-03 RX ADMIN — Medication 3 MG: at 22:22

## 2023-04-03 RX ADMIN — ATORVASTATIN CALCIUM 40 MG: 40 TABLET, FILM COATED ORAL at 17:09

## 2023-04-03 RX ADMIN — SENNOSIDES 17.2 MG: 8.6 TABLET, FILM COATED ORAL at 09:01

## 2023-04-03 RX ADMIN — PANTOPRAZOLE SODIUM 40 MG: 40 TABLET, DELAYED RELEASE ORAL at 06:03

## 2023-04-03 RX ADMIN — ENOXAPARIN SODIUM 40 MG: 40 INJECTION SUBCUTANEOUS at 09:03

## 2023-04-03 RX ADMIN — DOCUSATE SODIUM 100 MG: 100 CAPSULE, LIQUID FILLED ORAL at 09:02

## 2023-04-03 RX ADMIN — LOSARTAN POTASSIUM 50 MG: 50 TABLET, FILM COATED ORAL at 09:01

## 2023-04-03 RX ADMIN — DOCUSATE SODIUM 100 MG: 100 CAPSULE, LIQUID FILLED ORAL at 17:10

## 2023-04-03 NOTE — PROGRESS NOTES
04/03/23 0900   Pain Assessment   Pain Assessment Tool 0-10   Pain Score No Pain   Pain Location/Orientation Location: Head   Restrictions/Precautions   Precautions Bed/chair alarms; Fall Risk;Seizure   Weight Bearing Restrictions No   ROM Restrictions No   Cognition   Overall Cognitive Status WFL   Subjective   Subjective Pt states that he really wants to get back onto his mountain bike   Roll Left and Right   Reason if not Attempted Activity not applicable   Roll Left and Right CARE Score 9   Sit to Lying   Reason if not Attempted Activity not applicable   Sit to Lying CARE Score 9   Lying to Sitting on Side of Bed   Reason if not Attempted Activity not applicable   Lying to Sitting on Side of Bed CARE Score 9   Sit to Stand   Type of Assistance Needed Supervision   Physical Assistance Level No physical assistance   Comment RW   Sit to Stand CARE Score 4   Bed-Chair Transfer   Type of Assistance Needed Supervision   Physical Assistance Level No physical assistance   Comment RW   Chair/Bed-to-Chair Transfer CARE Score 4   Car Transfer   Reason if not Attempted Environmental limitations   Car Transfer CARE Score 10   Walk 10 Feet   Type of Assistance Needed Supervision   Physical Assistance Level No physical assistance   Comment Close S without AD   Walk 10 Feet CARE Score 4   Walk 50 Feet with Two Turns   Type of Assistance Needed Supervision   Physical Assistance Level No physical assistance   Comment Close S without AD   Walk 50 Feet with Two Turns CARE Score 4   Walk 150 Feet   Reason if not Attempted Activity not applicable   Walk 978 Feet CARE Score 9   Walking 10 Feet on Uneven Surfaces   Reason if not Attempted Activity not applicable   Walking 10 Feet on Uneven Surfaces CARE Score 9   Ambulation   Primary Mode of Locomotion Prior to Admission Walk   Distance Walked (feet) 91 ft   Gait Pattern Ataxic; Inconsistant [place];Decreased foot clearance   Limitations Noted In Balance; Coordination; Endurance; Heel Strike; Sequencing;Speed;Strength   Provided Assistance with: Balance   Does the patient walk? 2  Yes   Wheel 50 Feet with Two Turns   Reason if not Attempted Activity not applicable   Wheel 50 Feet with Two Turns CARE Score 9   Wheel 150 Feet   Reason if not Attempted Activity not applicable   Wheel 960 Feet CARE Score 9   Wheelchair mobility   Does the patient use a wheelchair? 0  No   Curb or Single Stair   Reason if not Attempted Activity not applicable   1 Step (Curb) CARE Score 9   4 Steps   Reason if not Attempted Activity not applicable   4 Steps CARE Score 9   12 Steps   Reason if not Attempted Activity not applicable   12 Steps CARE Score 9   Picking Up Object   Reason if not Attempted Safety concerns   Picking Up Object CARE Score 88   Toilet Transfer   Reason if not Attempted Activity not applicable   Toilet Transfer CARE Score 9   Therapeutic Interventions   Neuromuscular Re-Education mini BESTest   Other transfer training, ramp negotiation, gait training   Equipment Use   NuStep 10 min, seat 8, arms 6, level 3,  28 miles   Assessment   Treatment Assessment Pt agreeable to PT this AM, received sitting upright in recliner  Completed mini-BESTest to assess static, antcipatory, reactive balance with gait  Pt scored a 23/28, with difficulties noted with dual task, stepping over obstacles, walking with head turns, and rising to toes  Pt amb at close S without AD, with good postural control with stand-pivot transfers and turning, requiring VC for increased L foot clearance and upright posture  Will continue with current PT POC to improve deficits and promote return to PLOF  Problem List Decreased strength;Decreased endurance; Impaired balance;Decreased coordination;Decreased safety awareness   PT Barriers   Physical Impairment Decreased strength;Decreased range of motion;Decreased endurance; Impaired balance;Decreased mobility; Decreased safety awareness   Functional Limitation Walking;Transfers;Standing;Stair negotiation;Ramp negotiation   Plan   Treatment/Interventions Functional transfer training;LE strengthening/ROM; Therapeutic exercise; Endurance training;Patient/family training;Equipment eval/education; Bed mobility;Gait training   Progress Progressing toward goals   PT Therapy Minutes   PT Time In 0900   PT Time Out 1000   PT Total Time (minutes) 60   PT Mode of treatment - Individual (minutes) 60   PT Mode of treatment - Concurrent (minutes) 0   PT Mode of treatment - Group (minutes) 0   PT Mode of treatment - Co-treat (minutes) 0   PT Mode of Treatment - Total time(minutes) 60 minutes   PT Cumulative Minutes 210     Patient remains OOB in chair, all needs in reach  Alarm in place and activated  Encouraged use of call bell, patient verbalizes understanding

## 2023-04-03 NOTE — PLAN OF CARE
Problem: PAIN - ADULT  Goal: Verbalizes/displays adequate comfort level or baseline comfort level  Description: Interventions:  - Encourage patient to monitor pain and request assistance  - Assess pain using appropriate pain scale  - Administer analgesics based on type and severity of pain and evaluate response  - Implement non-pharmacological measures as appropriate and evaluate response  - Consider cultural and social influences on pain and pain management  - Notify physician/advanced practitioner if interventions unsuccessful or patient reports new pain  Outcome: Progressing     Problem: INFECTION - ADULT  Goal: Absence or prevention of progression during hospitalization  Description: INTERVENTIONS:  - Assess and monitor for signs and symptoms of infection  - Monitor lab/diagnostic results  - Monitor all insertion sites, i e  indwelling lines, tubes, and drains  - Monitor endotracheal if appropriate and nasal secretions for changes in amount and color  - [place] appropriate cooling/warming therapies per order  - Administer medications as ordered  - Instruct and encourage patient and family to use good hand hygiene technique  - Identify and instruct in appropriate isolation precautions for identified infection/condition  Outcome: Progressing     Problem: SAFETY ADULT  Goal: Patient will remain free of falls  Description: INTERVENTIONS:  - Educate patient/family on patient safety including physical limitations  - Instruct patient to call for assistance with activity   - Consult OT/PT to assist with strengthening/mobility   - Keep Call bell within reach  - Keep bed low and locked with side rails adjusted as appropriate  - Keep care items and personal belongings within reach  - Initiate and maintain comfort rounds  - Make Fall Risk Sign visible to staff  - Offer Toileting every 2 Hours, in advance of need  - Initiate/Maintain bed/chair alarm  - Apply yellow socks and bracelet for high fall risk patients  - Consider moving patient to room near nurses station  Outcome: Progressing     Problem: Prexisting or High Potential for Compromised Skin Integrity  Goal: Skin integrity is maintained or improved  Description: INTERVENTIONS:  - Identify patients at risk for skin breakdown  - Assess and monitor skin integrity  - Assess and monitor nutrition and hydration status  - Monitor labs   - Assess for incontinence   - Turn and reposition patient  - Assist with mobility/ambulation  - Relieve pressure over bony prominences  - Avoid friction and shearing  - Provide appropriate hygiene as needed including keeping skin clean and dry  - Evaluate need for skin moisturizer/barrier cream  - Collaborate with interdisciplinary team   - Patient/family teaching  - Consider wound care consult   Outcome: Progressing     Problem: Nutrition/Hydration-ADULT  Goal: Nutrient/Hydration intake appropriate for improving, restoring or maintaining nutritional needs  Description: Monitor and assess patient's nutrition/hydration status for malnutrition  Collaborate with interdisciplinary team and initiate plan and interventions as ordered  Monitor patient's weight and dietary intake as ordered or per policy  Utilize nutrition screening tool and intervene as necessary  Determine patient's food preferences and provide high-protein, high-caloric foods as appropriate       INTERVENTIONS:  - Monitor oral intake, urinary output, labs, and treatment plans  - Assess nutrition and hydration status and recommend course of action  - Evaluate amount of meals eaten  - Assist patient with eating if necessary   - Allow adequate time for meals  - Recommend/ encourage appropriate diets, oral nutritional supplements, and vitamin/mineral supplements  - Order, calculate, and assess calorie counts as needed  - Recommend, monitor, and adjust tube feedings and TPN/PPN based on assessed needs  - Assess need for intravenous fluids  - Provide specific nutrition/hydration education as appropriate  - Include patient/family/caregiver in decisions related to nutrition  Outcome: Progressing

## 2023-04-03 NOTE — PROGRESS NOTES
PM&R PROGRESS NOTE:  Jonathan Carlin 69 y o  male MRN: [id number]  Unit/Bed#: -01 Encounter: [id number]        Rehabilitation Diagnosis: Impairment of mobility, safety and Activities of Daily Living (ADLs) due to Brain Dysfunction:  02 1  Non-Traumatic    HPI: Jonathan Carlin is a 69 y o  male with a PMH significant for HTN, prostate cancer and CVA in 2011, who presented to the [facility] on 03/19/23 with one week of progressive left sided weakness  CTA with right frontal lobe cystic lesion with surrounding vasogenic edema consistent with metastatic disease  Local mass effect on the right lateral ventricle without significant midline shift  Right upper lobe mass with partially imaged right hilar adenopathy  MRI confirmed  Neurology and neurosurgery consulted  Patient initiated on Keppra and Decadron with plans for surgical resection within the week  Pulmonology consulted with plans for tissue biopsy after neurosurgery  Hem/Onc consulted and recommended outpatient follow up for definitive treatment plan  A full body scan was obtained on 3/22 which showed no metastatic disease  Pt taken to the OR with Dr Anila Najera on 03/23 and is s/p right frontal craniotomy  Intraop, patient had melton catheter placement that was difficulty  Keppra ordered post-op for one week for seizure ppx  Decadron taper continued for cerebral edema  Aspirin on hold for at least 2 weeks post-op  Urology consulted and recommended leaving in place for approximately seven days with removal at rehab and eventual follow up as an outpatient  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  Patient evaluated by PT/OT and deemed appropriate for post-acute rehabilitation services  He was accepted to [facility] and arrived on 3/30/23  SUBJECTIVE: Patient seen face to face  Offers no complaints at this time  States he feels he is doing well with therapy  He noted difficulty when ambulating side to side   He still is experiencing weakness to the left side, LE>UE but otherwise feels he is improving  He denies chest pain, shortness of breath, abdominal pain, constipation  He had the melton removed last week and denies any difficulty urinating, dysuria, urgency or frequency  ASSESSMENT: Stable, progressing      PLAN:    Rehabilitation  • Functional deficits:  Impaired mobility, self care, left-sided weakness (LE>UE)  • Continue current rehabilitation plan of care to maximize function  • Functional update:   o PT:  Sit-stand: supervision  Bed-chair transfer: supervision  Ambulation: cg  Stairs: cg  o OT:  Oral Hygiene:  supervision  Grooming: supervision  Tub/shower transfer: cg   Showering/bathing: cg  UB dressing: supervision  LB dressing: cg  Putting on/taking off footwear: max  o SLP:  Eating: independent   Memory: oriented x4  Regular diet; thin liquids  Cognition: WFL    • Estimated Discharge: To be discussed in this week's team meeting      Pain  • Tylenol 975 mg Q8H PRN for mild pain  • Oxycodone 2 5-5 mg Q4H PRN for mod-severe pain    DVT prophylaxis  • Lovenox SQ inj 40 mg daily     Bladder plan  • Continent    Bowel plan  • Continent  • Last BM 4/2/23  • Continue Senna daily, Colace BID, Dulcolax rectal suppository daily PRN    Skin care  · Monitor skin daily  · Apply skin nourishing cream  · Reposition Q2H to offload pressure  · Elevate heels off bed  · Monitor scalp incision site - POD # 11  · Neurosurgery requested photo prior to removal of staples on 4/6      Melton catheter in place  Assessment & Plan  · Melton with difficult insertion intraop x7 days now   · Melton discontinued 3/31 successfully without complication   Voiding trial successful  · Patient now passing urine without complication   · OP urology follow up     Lung nodule  Assessment & Plan  · Noted on imaging   · Has follow up with hem/onc 4/5 for definitive treatment plan    Cerebral edema (HCC)  Assessment & Plan  · Continue Decadron taper Brain compression ([address] )  Assessment & Plan  · Plan under brain mass    Hypercholesterolemia  Assessment & Plan  · Continue Lipitor 40 mg daily after dinner    Essential hypertension  Assessment & Plan  · Monitor vitals  · Continue Cozaar 50 mg daily and Norvasc 10 mg daily    * Brain mass  Assessment & Plan  · Presented with one week of left sided weakness   · CTA with right frontal lobe cystic lesion with surrounding vasogenic edema consistent with metastatic ds  Local mass effect on the right alteral ventricle without significant midline shift  Right upper lobe mass with partially imaged right hilar adenopathy  · S/p Right frontal Craniotomy image-guided for tumor resection with Dr Elia Leach on 3/23/23  · POD #11  · Keppra 500 mg Q12H x1 week for seizure ppx - completed   · Continue Decadron taper for associated cerebral edema - 2 doses left to completion   · Does have elevated white count  Likely due to Decadron taper  · Appropriate blood glucose readings- accu cheks discontinued today  · Continue to hold Aspirin for at least 2 weeks per neurosurgery   · Will need hem/onc follow up with concern from lung primary disease with RUL mass   · Hem/onc celeste on 4/5 moved to 4/18  · Pt will follow up with Neurosurgery in May  · Acute chomprehensive interdisciplinary inpatient rehabilitation to include intensive skilled therapies as outlines with oversight and management by a rehabilitation Physician Assistant overseen by rehabilitation physician as well as inpatient rehabilitation nursing, case management and weekly interdisciplinary team meeting        Appreciate IM consultants medical co-management  Labs, medications, and imaging personally reviewed  ROS:  A ten point review of systems was completed and pertinent positives are listed in subjective section  All other systems reviewed were negative  Review of Systems   Constitutional: Negative  HENT: Negative  Respiratory: Negative    Negative for "shortness of breath  Cardiovascular: Negative  Negative for chest pain  Gastrointestinal: Negative  Negative for abdominal pain and constipation  Genitourinary: Negative  Negative for difficulty urinating, dysuria, frequency and urgency  Musculoskeletal: Negative  Neurological: Positive for weakness  LLE>LUE   Psychiatric/Behavioral: Negative  OBJECTIVE:   /71 (BP Location: Right arm)   Pulse 75   Temp 98 1 °F (36 7 °C) (Temporal)   Resp 16   Ht 5' 9\" (1 753 m)   Wt 81 3 kg (179 lb 3 7 oz)   SpO2 96%   BMI 26 47 kg/m²     Physical Exam  Vitals reviewed  Constitutional:       General: He is not in acute distress  Appearance: He is not ill-appearing  HENT:      Head: Normocephalic  Comments: Incision site with intact staples  Eyes:      Pupils: Pupils are equal, round, and reactive to light  Cardiovascular:      Rate and Rhythm: Normal rate and regular rhythm  Pulses: Normal pulses  Heart sounds: Normal heart sounds  No murmur heard  Pulmonary:      Effort: Pulmonary effort is normal  No respiratory distress  Breath sounds: Normal breath sounds  Abdominal:      General: Bowel sounds are normal  There is no distension  Palpations: Abdomen is soft  Musculoskeletal:      Right lower leg: No edema  Left lower leg: No edema  Skin:     General: Skin is warm and dry  Neurological:      General: No focal deficit present  Mental Status: He is alert and oriented to person, place, and time     Psychiatric:         Mood and Affect: Mood normal          Behavior: Behavior normal           Lab Results   Component Value Date    WBC 15 10 (H) 03/31/2023    HGB 13 9 03/31/2023    HCT 42 8 03/31/2023    MCV 92 03/31/2023     03/31/2023     Lab Results   Component Value Date    SODIUM 134 (L) 03/31/2023    K 4 1 03/31/2023     03/31/2023    CO2 25 03/31/2023    BUN 18 03/31/2023    CREATININE 0 69 03/31/2023    GLUC 106 " 03/31/2023    CALCIUM 8 3 (L) 03/31/2023     Lab Results   Component Value Date    INR 1 06 03/24/2023    INR 1 00 03/23/2023    INR 0 96 03/19/2023    PROTIME 14 0 03/24/2023    PROTIME 13 4 03/23/2023    PROTIME 12 8 03/19/2023           Current Facility-Administered Medications:   •  acetaminophen (TYLENOL) tablet 975 mg, 975 mg, Oral, Q8H PRN, Guru Yadira, PA-C, 975 mg at 03/31/23 0431  •  amLODIPine (NORVASC) tablet 10 mg, 10 mg, Oral, Daily, Guru Yadira, PA-C, 10 mg at 04/03/23 0901  •  atorvastatin (LIPITOR) tablet 40 mg, 40 mg, Oral, After Lord Diones, PA-C, 40 mg at 04/02/23 1716  •  bisacodyl (DULCOLAX) rectal suppository 10 mg, 10 mg, Rectal, Daily PRN, Guru Yadira, PA-C  •  [COMPLETED] dexamethasone (DECADRON) tablet 2 mg, 2 mg, Oral, Q8H [facility], 2 mg at 03/31/23 1422 **FOLLOWED BY** [COMPLETED] dexamethasone (DECADRON) tablet 2 mg, 2 mg, Oral, Q12H LEN, 2 mg at 04/02/23 0833 **FOLLOWED BY** dexamethasone (DECADRON) tablet 2 mg, 2 mg, Oral, Q24H [facility], Guru Yadira, PA-C, 2 mg at 04/03/23 0901  •  docusate sodium (COLACE) capsule 100 mg, 100 mg, Oral, BID, Guru Yadira, PA-C, 100 mg at 04/03/23 3385  •  enoxaparin (LOVENOX) subcutaneous injection 40 mg, 40 mg, Subcutaneous, Daily, Guru Yadira, PA-C, 40 mg at 04/03/23 0464  •  losartan (COZAAR) tablet 50 mg, 50 mg, Oral, Daily, Guru Yadira, PA-C, 50 mg at 04/03/23 0901  •  melatonin tablet 3 mg, 3 mg, Oral, HS, Guru Yadira, PA-C, 3 mg at 04/02/23 2203  •  ondansetron (ZOFRAN-ODT) dispersible tablet 4 mg, 4 mg, Oral, Q6H PRN, Guru Marcelinotune, PA-C  •  oxyCODONE (ROXICODONE) IR tablet 5 mg, 5 mg, Oral, Q4H PRN, Guru May, PA-C  •  oxyCODONE (ROXICODONE) split tablet 2 5 mg, 2 5 mg, Oral, Q4H PRN, Guru Marcelinotune, PA-C  •  pantoprazole (PROTONIX) EC tablet 40 mg, 40 mg, Oral, Early Morning, Guru Yadira, PA-C, 40 mg at 04/03/23 4029  •  senna (SENOKOT) tablet 17 2 mg, 2 tablet, Oral, Daily, Guru May, PA-C, 17 2 mg at 04/03/23 5162    Past Medical History:   Diagnosis Date   • Hypertension    • Prostate cancer ([facility] [address] [address] ) 2001   • Rectal bleeding    • Stroke [facility])     2011         Patient Active Problem List    Diagnosis Date Noted   • Mcclelland catheter in place 03/24/2023   • Lung nodule 03/21/2023   • Brain mass 03/20/2023   • Brain compression ([facility] ) 03/20/2023   • [facility] 03/20/2023   • Hypercholesterolemia 09/20/2021   • CVA (cerebral vascular accident) ([facility] [address] ) 08/12/2021   • Essential hypertension 09/11/2020   • Annual physical exam 09/11/2020   • Negative depression screening 02/24/2020   • Overweight (BMI 25 0-29 9) 02/24/2020          Mellisa Romo PA-C    I have spent a total time of 35 minutes on 04/03/23 in caring for this patient including Diagnostic results, Risks and benefits of tx options, Patient and family education, Impressions, Counseling / Coordination of care, Documenting in the medical record, Reviewing / ordering tests, medicine, procedures  , Obtaining or reviewing history   and Communicating with other healthcare professionals   ** Please Note:  voice to text software may have been used in the creation of this document   Although proof errors in transcription or interpretation are a potential of such software**

## 2023-04-03 NOTE — PROGRESS NOTES
04/03/23 1009   Pain Assessment   Pain Assessment Tool 0-10   Pain Score No Pain   Restrictions/Precautions   Precautions Bed/chair alarms; Fall Risk;Seizure   Weight Bearing Restrictions No   ROM Restrictions No   Eating   Type of Assistance Needed Independent   Eating CARE Score 6   Eating Assessment   Food To Mouth Yes   Able To Cut Yes   Positioning Upright;Out of Bed   Meal Assessed Lunch   Opens Packages Yes   Oral Hygiene   Type of Assistance Needed Set-up / clean-up   Oral Hygiene CARE Score 5   Grooming   Able To Initiate Tasks;Comb/Brush Hair;Wash/Dry Hands; Wash/Dry Face;Brush/Clean Teeth   Limitation Noted In Safety;Strength   Findings s/u at the sink   Shower/Bathe Self   Type of Assistance Needed Supervision; Incidental touching   Shower/Bathe Self CARE Score 4   Bathing   Assessed Bath Style Shower   Anticipated D/C Bath Style Shower;Sponge Bath   Able to Demetrius Wilmer No   Able to [employer] Temperature No   Able to Wash/Rinse/Dry (body part) Left Arm;Right Arm;L Upper Leg;R Upper Leg;L Lower Leg/Foot;R Lower Leg/Foot;Chest;Abdomen;Perineal Area; Buttocks   Limitations Noted in Balance; Endurance;Problem Solving;ROM;Safety;Strength   Positioning Seated;Standing   Adaptive Equipment Tub Bench; Shower [employer]   Limitations Noted In Balance; Endurance;Problem Solving;ROM;Safety;LE Strength   Adaptive Equipment Grab Bars;Transfer Bench   Assessed Shower   Findings CGA stepping in   Upper Body Dressing   Type of Assistance Needed Set-up / clean-up   Upper Body Dressing CARE Score 5   Lower Body Dressing   Type of Assistance Needed Incidental touching   Lower Body Dressing CARE Score 4   Putting On/Taking Off Footwear   Type of Assistance Needed Supervision;Verbal cues   Comment able to cross one leg over the otehr in doff and bár[place] socks   Putting On/Taking Off Footwear CARE Score 4   Dressing/Undressing Clothing   [facility]   Remove LB Clothes Pants; Undergarment;Socks; Shoes   Don LB [employer]; Undergarment;Socks; Shoes   Limitations Noted In Balance; Endurance;Problem Solving; Safety;Strength;ROM   Positioning Supported Sit;Standing   Sit to Stand   Type of Assistance Needed Supervision   Sit to Stand CARE Score 4   Bed-Chair Transfer   Type of Assistance Needed Supervision; Incidental touching   Chair/Bed-to-Chair Transfer CARE Score 4   Exercise Tools   UE Ergometer 5 minutes forward and backwards BUE mod resistance UBE   Theraputty red putty small item retrieval with B hands focusing more on Left hand   Other Exercise Tool 1 yellow flex therabar 2 sets 15 up and down BUE   Other Exercise Tool 2 card game 3 sets of Crazy 8s with no difficulty following directions of holding/ placing cards   Coordination   Fine Motor knots out and into tubing with BUE   Cognition   Overall Cognitive Status [facility]   Arousal/Participation Alert; Responsive; Cooperative   Attention Within functional limits   Orientation Level Oriented X4   Memory Within functional limits   Following Commands Follows all commands and directions without difficulty   Additional Activities   Additional Activities Other (Comment)   Additional Activities Comments fxl mobility to and from OT room S/ CGA with RW   Assessment   Treatment Assessment Pt presents sitting in recliner agreeable to OT session including ADLs, transfers/ mobility and BUE therex focusing on neuro senait and activity tolerance  Pt requires supervision and assist due to decreased balance, safety, endurance and geenralized strength/ ROM yessica of LUE  Pt is progressing nicely towards goals and will benefit form continued skilled OT servcies to increase independence with daily tasks  Problem List Decreased strength;Decreased range of motion;Decreased endurance; Impaired balance;Decreased safety awareness;Decreased skin integrity   Plan   Treatment/Interventions ADL retraining;Functional transfer training; Therapeutic exercise; Endurance training;Patient/family training;Equipment eval/education; Compensatory technique education   Progress Progressing toward goals   OT Therapy Minutes   OT Time In 1009   OT Time Out 1204   OT Total Time (minutes) 115   OT Mode of treatment - Individual (minutes) 115   Therapy Time missed   Time missed?  No

## 2023-04-03 NOTE — PHYSICAL THERAPY NOTE
Mini Best    1  Sit to Stand  2= Normal:  Comes to stand without use of hands and stabilizes independently  2  Rise to Toes  1=Moderate:  Heels up, but not full range (smaller than when holding hands) OR noticeable instability for 3 seconds  3  Stand on one leg  1=Moderate: < 20 seconds  4  Compensatory Stepping Correction - Forward  2= Normal:  Recovers independently with a single, large step (2nd realignment step is allowed)  5  Compensatory Stepping Correction - Backward  2= Normal:  Recovers independently with a single, large step (2nd realignment step is allowed)  6   Compensatory Stepping Correction - Lateral  2= Normal:  Recovers independently with a single, large step (cross over or lateral OK)  7  Stance (Feet Together); Eyes open, Firm surface  2= Normal:  30 seconds  8  Stance (Feet Together); Eyes closed, Foam surface  2= Normal:  30 seconds  9  Incline - Eyes Closed  2= Normal:  Stands independently 30 seconds and aligns with gravity  10  Change in gait speed  2= Normal:  Significantly changes walking speed without imbalance  11  Walk with Head Turns - Horizontal  1= Moderate:  Performs head turns with reduction in gait speed  12   Walk with Pivot Turns  2=  Normal:  Turns with feet close FAST (< or = to 3 steps) with good balance  13  Step over obstacles  1= Moderate: step over box but touches box OR displays cautious behavior by slowing gait  14    Timed up and go with dual task (3 meter walk)  TUG: 10 Seconds  Dual Task TU Seconds  1= Moderate: Dual Task affects counting OR walking (>10%) when compared to the TUG without Dual Task  Total Score: 23/28

## 2023-04-03 NOTE — PROGRESS NOTES
04/03/23 1230   Pain Assessment   Pain Assessment Tool 0-10   Pain Score No Pain   Restrictions/Precautions   Precautions Bed/chair alarms; Fall Risk;Seizure   Cognition   Overall Cognitive Status WFL   Subjective   Subjective Reports he is ready for therapy this afternoon   Roll Left and Right   Reason if not Attempted Activity not applicable   Roll Left and Right CARE Score 9   Sit to Lying   Reason if not Attempted Activity not applicable   Sit to Lying CARE Score 9   Lying to Sitting on Side of Bed   Reason if not Attempted Activity not applicable   Lying to Sitting on Side of Bed CARE Score 9   Sit to Stand   Type of Assistance Needed Supervision   Physical Assistance Level No physical assistance   Sit to Stand CARE Score 4   Bed-Chair Transfer   Type of Assistance Needed Supervision   Physical Assistance Level No physical assistance   Comment RW   Chair/Bed-to-Chair Transfer CARE Score 4   Car Transfer   Reason if not Attempted Environmental limitations   Car Transfer CARE Score 10   Walk 10 Feet   Type of Assistance Needed Supervision   Physical Assistance Level No physical assistance   Comment RW   Walk 10 Feet CARE Score 4   Walk 50 Feet with Two Turns   Type of Assistance Needed Supervision   Physical Assistance Level No physical assistance   Comment RW-200 feet  Trek poles with Cl S for 125 ft   Walk 50 Feet with Two Turns CARE Score 4   Walk 150 Feet   Type of Assistance Needed Supervision   Physical Assistance Level No physical assistance   Comment RW/trek poles   Walk 150 Feet CARE Score 4   Walking 10 Feet on Uneven Surfaces   Type of Assistance Needed Incidental touching   Physical Assistance Level 25% or less   Comment green foam, trek poles   Walking 10 Feet on Uneven Surfaces CARE Score 3   Ambulation   Primary Mode of Locomotion Prior to Admission Walk   Distance Walked (feet) 91 ft   Gait Pattern Ataxic; Inconsistant [place];Decreased foot clearance; Forward Flexion   Limitations Noted In Balance; Coordination; Endurance; Heel Strike; Sequencing;Speed;Strength   Provided Assistance with: Balance   Walk Assist Level Close Supervision   Findings Close S without AD   Does the patient walk? 2  Yes   Wheel 50 Feet with Two Turns   Reason if not Attempted Activity not applicable   Wheel 50 Feet with Two Turns CARE Score 9   Wheel 150 Feet   Reason if not Attempted Activity not applicable   Wheel 033 Feet CARE Score 9   Wheelchair mobility   Does the patient use a wheelchair? 0  No   Curb or Single Stair   Style negotiated Single stair   Type of Assistance Needed Incidental touching   Physical Assistance Level 25% or less   Comment CGA up/down 14 steps with b/l HRs; step-to 7 steps, reciprocal 7 steps   1 Step (Curb) CARE Score 3   4 Steps   Type of Assistance Needed Incidental touching   Physical Assistance Level 25% or less   4 Steps CARE Score 3   12 Steps   Type of Assistance Needed Incidental touching   Physical Assistance Level 25% or less   Comment CGA up/down 14 steps with b/l HRs; step-to 7 steps, reciprocal 7 steps   12 Steps CARE Score 3   Picking Up Object   Reason if not Attempted Safety concerns   Picking Up Object CARE Score 88   Toilet Transfer   Reason if not Attempted Activity not applicable   Toilet Transfer CARE Score 9   Therapeutic Interventions   Strengthening Standing LE TE - blue foam   Neuromuscular Re-Education Reciprocal stepping/reaching with Cognitive task   Other gait training, stair training, transfer training   Assessment   Treatment Assessment Pt agreeable to PT this PM, received sitting upright in recliner  Challenged pt's coordination of movement with opposite-foot opposite-arm reaching activity with cognitive task  Pt had difficulty dividing attention between two tasks, often making mistakes with correct UE/LE movement and/or forgetting word he was spelling  After VC, patient demonstrated improved coordination of L LE to return to starting position without foot drag   Pt tolerated standing LE TE on blue foam well with b/l UE support at [facility], having greater difficulty with L hip ext/ABd  Will continue with current PT POC to improve deficits and promote return to PLOF  Problem List Decreased strength;Decreased range of motion;Decreased endurance; Impaired balance;Decreased safety awareness;Decreased skin integrity   Barriers to Discharge Inaccessible home environment;Decreased caregiver support   PT Barriers   Physical Impairment Decreased strength;Decreased range of motion;Decreased endurance; Impaired balance;Decreased mobility; Decreased safety awareness   Functional Limitation Walking;Transfers;Standing;Stair negotiation;Ramp negotiation   Plan   Treatment/Interventions Functional transfer training;LE strengthening/ROM; Elevations; Therapeutic exercise; Endurance training;Patient/family training;Equipment eval/education; Bed mobility;Gait training   Progress Progressing toward goals   PT Therapy Minutes   PT Time In 1230   PT Time Out 1330   PT Total Time (minutes) 60   PT Mode of treatment - Individual (minutes) 30   PT Mode of treatment - Concurrent (minutes) 30   PT Mode of treatment - Group (minutes) 0   PT Mode of treatment - Co-treat (minutes) 0   PT Mode of Treatment - Total time(minutes) 60 minutes   PT Cumulative Minutes 270     Patient remains OOB in chair, all needs in reach  Alarm in place and activated  Encouraged use of call bell, patient verbalizes understanding

## 2023-04-03 NOTE — TELEPHONE ENCOUNTER
Called [facility] and spoke with the nurse  She denies any incisional issues or fevers at this time  They would like to cancel patient's 2 week POV with the nurse for incision check bec they will remove any staples/sutures at the facility and email the nurse a photo of the patient's incision at 2 weeks  Patient already had path review with Lorena  Reminded staff of patient's 6 week POV with Dr Danielito Zazueta and provided the date time and location and reminded them to call with any further questions or concerns or if any incisional issues were to arise  She verbalized an understanding and was appreciative for the coordination

## 2023-04-03 NOTE — PCC PHYSICAL THERAPY
4/4/2023:  Patient seen for PT during ARU stay  Presents following removal of brain mass and residual deficits with decreased ROM/strength, decreased balance and safety, decreased endurance, and pain  Patient Cl S bed mobility, CGA/min A transfers with RW, CGA ambulation up to 91 feet on level and unlevel surfaces without AD and 200 feet with RW    S negotiation of 14 steps with bilateral handrails  Patient would benefit from continued inpatient [facility] PT to increase function, safety, and increased independence in prep for safe d/c to home  4/11/2023:  Patient making steady progress with PT, scheduled for discharge today  Presents following removal of brain mass and residual deficits with decreased ROM/strength, decreased balance and safety, decreased endurance, and pain  Patient MI bed mobility, MI transfers without AD, MI ambulation up to 600+ feet on level and unlevel surfaces  MI negotiation of 14 steps with single handrail  Patient would benefit from continued inpatient [facility] PT to increase function, safety, and increased independence in prep for safe d/c to home

## 2023-04-03 NOTE — NURSING NOTE
Care taken over from [address] at 1500  Patient sitting in recliner chair  Voices no complaints of pain  Patient has MRI on 4/6 per call from neurosurgery at [place]  Transportation set up by this nurse for 2:30  Patient made aware of same  Will continue to monitor and follow plan of care

## 2023-04-03 NOTE — PCC OCCUPATIONAL THERAPY
4/3/2023: Pt participates in ADLs, transfers/ mobility and BUE therex focusing on neuro senait yessica LUE  Pt requires assist and supervision due to decreased balance, safety, endurance and generalized strength/ ROM yessica of LUE with head incision  Pt is making gains towards goals and current LOF as listed above  Pt will benefit from continued skilled OT services to increase independence with daily tasks  4/10/23: Pt participates in ADLs, transfers/ mobility and BUE therex focusing on neuro senait yessica LUE  Pt is making gains towards goals and current LOF as listed abover requiring occ reminders due to decreased endurance  Pt will benefit from continued skilled OT services to increase independence with daily tasks prior to discharge and then outpatient following discharge from [facility]

## 2023-04-03 NOTE — CONSULTS
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; requiring STR and all therapy as per Physiatry, will need follow up with Heme/Onc and Neurosurgery            Thank you for allowing us to participate in the care of your patient  Please feel free to contact us regarding the care of this patient, or any other questions/concerns that may be applicable  Patient Active Problem List   Diagnosis   • Negative depression screening   • Overweight (BMI 25 0-29  9)   • Essential hypertension   • Annual physical exam   • CVA (cerebral vascular accident) ([facility] [address] )   • Hypercholesterolemia   • Brain mass   • Brain compression (HCC)   • Cerebral edema (HCC)   • Lung nodule   • Mcclelland catheter in place       History of Present Illness   Physician Requesting Consult: Erickson Fernandes MD  Reason for Consult / Principal Problem: Medical Management   Hx and PE limited by:   HPI: Costa Rollins is a 69y o  year old male with a PMH significant for HTN, prostate cancer and CVA in 2011, who [facility] [address] [facility] with one week of progressive left sided weakness  CTA with right frontal lobe cystic lesion with surrounding vasogenic edema consistent with metastatic disease  Local mass effect on the right lateral ventricle without significant midline shift  Right upper lobe mass with partially imaged right hilar adenopathy  MRI confirmed  Neurology and neurosurgery consulted   Patient initiated on Keppra and Decadron with plans for surgical resection within the week Pulmonology consulted with plans for tissue biopsy after neurosurgery  Hem/Onc consulted and recommended outpatient follow up for definitive treatment plan  A full body scan was obtained on 3/22 which showed no metastatic disease  Pt taken to the OR with Dr Anila Najera on 03/23 and is s/p right frontal craniotomy  Intraop, patient had melton catheter placement that was difficulty  Keppra ordered post-op for one week for seizure ppx  Decadron taper continued for cerebral edema  Aspirin on hold for at least 2 weeks post-op  Urology consulted and recommended leaving in place for approximately seven days with removal at rehab and eventual follow up as an outpatient  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  Patient evaluated by PT/OT and deemed appropriate for post-acute rehabilitation services  He was accepted to [facility] and arrived on 3/30/23  History obtained from chart review and the patient    Constitutional ROS- Denies fatigue, fever, chills, night sweats, weight changes  HEENT ROS- Denies history of eye surgeries, glaucoma, headaches   Endocrine ROS- No history diabetes mellitus or thyroid disease  Cardiovascular ROS- Denies chest pain, palpitation, dyspnea exertion, orthopnea, claudication  Pulmonary ROS- Denies history of COPD, asthma  Denies cough, hemoptysis, shortness of breath  GI ROS- Denies abdominal pain, diarrhea, nausea, swallowing problems, vomiting, constipation, blood in stools, fecal incontinence  Hematological ROS- Denies history of easy bruising, blood clots, bleeding or blood transfusions  Genitourinary ROS- Denies recent hematuria, pyuria, flank pain, change in urinary stream, decreased urinary output, increased urinary frequency, nocturia, foamy urine, or urinary incontinence  Lymphatic ROS- Denies lymphadenopathy  Musculoskeletal ROS- Denies history of gout, muscle weakness, joint pain    Dermatological ROS- Denies rash, wounds, ulcers, itching, jaundice  Psychiatric ROS- Denies anxiety, depression, hallucinations, disorientation  Neurological ROS-  Denies dizziness, paresthesias, history of peripheral neuropathy   Positive history of cva     Historical Information   Past Medical History:   Diagnosis Date   • Hypertension    • Prostate cancer ([facility] [address] )    • Rectal bleeding    • Stroke [facility])            Past Surgical History:   Procedure Laterality Date   • COLONOSCOPY     • CRANIOTOMY Right 3/23/2023    Procedure: Right frontal CRANIOTOMY IMAGE-GUIDED FOR TUMOR;  Surgeon: Hafsa Santiago MD;  Location: [facility];  Service: Neurosurgery   • GA CYSTOURETHROSCOPY N/A 3/23/2023    Procedure: EUA, DEL CASTILLO INSERTION;  Surgeon: Elsy Clifton MD;  Location: [facility];  Service: Urology   • PROSTATECTOMY     • TONSILLECTOMY       Social History   Social History     Substance and Sexual Activity   Alcohol Use Yes   • Alcohol/week: 6 0 standard drinks   • Types: 6 Cans of beer per week    Comment: does not drink every day     Social History     Substance and Sexual Activity   Drug Use Not Currently   • Types: Marijuana     Social History     Tobacco Use   Smoking Status Former   • Packs/day: 1 00   • Years: 15    • Pack years: 15 00   • Types: Cigarettes   • Passive exposure: Never   Smokeless Tobacco Never   Tobacco Comments    i quit when i was 25  not sure of exact dates     Family History   Problem Relation Age of Onset   • Dementia Mother            • Breast cancer Sister            • Prostate cancer Brother         Received Radiation       Meds/Allergies   all current active meds have been reviewed, current meds:   Current Facility-Administered Medications   Medication Dose Route Frequency   • acetaminophen (TYLENOL) tablet 975 mg  975 mg Oral Q8H PRN   • amLODIPine (NORVASC) tablet 10 mg  10 mg Oral Daily   • atorvastatin (LIPITOR) tablet 40 mg  40 mg Oral After Dinner   • bisacodyl (DULCOLAX) rectal suppository 10 mg  10 mg Rectal Daily PRN   • dexamethasone (DECADRON) tablet 2 mg  2 mg Oral Q24H LEN   • docusate sodium (COLACE) capsule 100 mg  100 mg Oral BID   • enoxaparin (LOVENOX) subcutaneous injection 40 mg  40 mg Subcutaneous Daily   • losartan (COZAAR) tablet 50 mg  50 mg Oral Daily   • melatonin tablet 3 mg  3 mg Oral HS   • ondansetron (ZOFRAN-ODT) dispersible tablet 4 mg  4 mg Oral Q6H PRN   • oxyCODONE (ROXICODONE) IR tablet 5 mg  5 mg Oral Q4H PRN   • oxyCODONE (ROXICODONE) split tablet 2 5 mg  2 5 mg Oral Q4H PRN   • pantoprazole (PROTONIX) EC tablet 40 mg  40 mg Oral Early Morning   • senna (SENOKOT) tablet 17 2 mg  2 tablet Oral Daily    and PTA meds:    Medications Prior to Admission   Medication   • amLODIPine (NORVASC) 10 mg tablet   • aspirin (ECOTRIN LOW STRENGTH) 81 mg EC tablet   • atorvastatin (LIPITOR) 40 mg tablet   • [] dexamethasone (DECADRON) 2 mg tablet   • [] dexamethasone (DECADRON) 2 mg tablet   • dexamethasone (DECADRON) 2 mg tablet   • losartan (COZAAR) 50 mg tablet   • pantoprazole (PROTONIX) 40 mg tablet         No Known Allergies    Objective     Intake/Output Summary (Last 24 hours) at 4/3/2023 1515  Last data filed at 4/3/2023 0900  Gross per 24 hour   Intake 540 ml   Output 800 ml   Net -260 ml       Invasive Devices:        Physical Exam  Vitals and nursing note reviewed  Constitutional:       General: He is not in acute distress  Appearance: Normal appearance  He is normal weight  He is not ill-appearing  HENT:      Right Ear: External ear normal       Left Ear: External ear normal       Nose: Nose normal  No congestion or rhinorrhea  Mouth/Throat:      Mouth: Mucous membranes are moist       Pharynx: Oropharynx is clear  No oropharyngeal exudate or posterior oropharyngeal erythema  Eyes:      Extraocular Movements: Extraocular movements intact  Conjunctiva/sclera: Conjunctivae normal       Pupils: Pupils are equal, round, and reactive to light     Cardiovascular:      Rate and Rhythm: Normal rate and regular rhythm  Pulses: Normal pulses  Heart sounds: Normal heart sounds  Pulmonary:      Effort: Pulmonary effort is normal       Breath sounds: Normal breath sounds  Abdominal:      General: Abdomen is flat  There is no distension  Palpations: Abdomen is soft  Musculoskeletal:         General: Normal range of motion  Cervical back: Normal range of motion and neck supple  Skin:     General: Skin is warm and dry  Capillary Refill: Capillary refill takes less than 2 seconds  Comments: Positive frontal/right staples intact no drainage noted   Neurological:      Mental Status: He is alert and oriented to person, place, and time  Mental status is at baseline  Motor: Weakness present  Psychiatric:         Mood and Affect: Mood normal          Behavior: Behavior normal          Thought Content: Thought content normal          Judgment: Judgment normal            I/O last 3 completed shifts: In: 540 [P O :540]  Out: 1300 [Urine:1300]    Vitals:    04/03/23 0722   BP: 121/71   Pulse: 75   Resp: 16   Temp: 98 1 °F (36 7 °C)   SpO2: 96%         Current Weight: Weight - Scale: 81 3 kg (179 lb 3 7 oz)  First Weight: Weight - Scale: 81 4 kg (179 lb 7 3 oz)    Lab Results:  I have personally reviewed pertinent labs      CBC: No results found for: WBC, HGB, HCT, MCV, PLT, ADJUSTEDWBC, MCH, MCHC, RDW, MPV, NRBC  CMP: No results found for: NA, K, CL, CO2, ANIONGAP, BUN, CREATININE, GLUCOSE, CALCIUM, AST, ALT, ALKPHOS, PROT, BILITOT, EGFR  Phosphorus: No results found for: PHOS  Magnesium: No results found for: MG  Urinalysis: No results found for: COLORU, CLARITYU, SPECGRAV, PHUR, LEUKOCYTESUR, NITRITE, PROTEINUA, GLUCOSEU, KETONESU, BILIRUBINUR, BLOODU  Ionized Calcium: No results found for: CAION  Coagulation: No results found for: PT, INR, APTT  Troponin: No results found for: TROPONINI  ABG: No results found for: PHART, DLF5UKM, PO2ART, XVS6AUA, I8QPMCTA, ROHITTERRIE, SOURCE    Results from last 7 days   Lab Units 03/31/23  0506 03/29/23  0519   POTASSIUM mmol/L 4 1 4 1   CHLORIDE mmol/L 102 108   CO2 mmol/L 25 25   BUN mg/dL 18 20   CREATININE mg/dL 0 69 0 68   CALCIUM mg/dL 8 3* 8 1*   ALK PHOS U/L 52  --    ALT U/L 21  --    AST U/L 22  --        Radiology review:  No results found  EKG, Pathology, and Other Studies: I have reviewed pertinent studies       Counseling / Coordination of Care  Total ADDITIONAL floor / unit time spent today 50  minutes  Greater than 50% of total time was spent with the patient and / or family counseling and / or coordination of care  Tamara Houston      This consultation note was produced in part using a dictation device which may document imprecise wording from author's original intent

## 2023-04-04 LAB
ANION GAP SERPL CALCULATED.3IONS-SCNC: 8 MMOL/L (ref 4–13)
BASOPHILS # BLD AUTO: 0.03 THOUSANDS/ΜL (ref 0–0.1)
BASOPHILS NFR BLD AUTO: 0 % (ref 0–1)
BUN SERPL-MCNC: 15 MG/DL (ref 5–25)
CALCIUM SERPL-MCNC: 8.3 MG/DL (ref 8.4–10.2)
CHLORIDE SERPL-SCNC: 104 MMOL/L (ref 96–108)
CO2 SERPL-SCNC: 27 MMOL/L (ref 21–32)
CREAT SERPL-MCNC: 0.74 MG/DL (ref 0.6–1.3)
EOSINOPHIL # BLD AUTO: 0.2 THOUSAND/ΜL (ref 0–0.61)
EOSINOPHIL NFR BLD AUTO: 2 % (ref 0–6)
ERYTHROCYTE [DISTWIDTH] IN BLOOD BY AUTOMATED COUNT: 12.9 % (ref 11.6–15.1)
GFR SERPL CREATININE-BSD FRML MDRD: 94 ML/MIN/1.73SQ M
GLUCOSE P FAST SERPL-MCNC: 102 MG/DL (ref 65–99)
GLUCOSE SERPL-MCNC: 102 MG/DL (ref 65–140)
HCT VFR BLD AUTO: 39.9 % (ref 36.5–49.3)
HGB BLD-MCNC: 13 G/DL (ref 12–17)
IMM GRANULOCYTES # BLD AUTO: 0.14 THOUSAND/UL (ref 0–0.2)
IMM GRANULOCYTES NFR BLD AUTO: 1 % (ref 0–2)
LYMPHOCYTES # BLD AUTO: 1.99 THOUSANDS/ΜL (ref 0.6–4.47)
LYMPHOCYTES NFR BLD AUTO: 17 % (ref 14–44)
MCH RBC QN AUTO: 30 PG (ref 26.8–34.3)
MCHC RBC AUTO-ENTMCNC: 32.6 G/DL (ref 31.4–37.4)
MCV RBC AUTO: 92 FL (ref 82–98)
MONOCYTES # BLD AUTO: 0.91 THOUSAND/ΜL (ref 0.17–1.22)
MONOCYTES NFR BLD AUTO: 8 % (ref 4–12)
NEUTROPHILS # BLD AUTO: 8.45 THOUSANDS/ΜL (ref 1.85–7.62)
NEUTS SEG NFR BLD AUTO: 72 % (ref 43–75)
NRBC BLD AUTO-RTO: 0 /100 WBCS
PLATELET # BLD AUTO: 162 THOUSANDS/UL (ref 149–390)
PMV BLD AUTO: 10.5 FL (ref 8.9–12.7)
POTASSIUM SERPL-SCNC: 3.7 MMOL/L (ref 3.5–5.3)
RBC # BLD AUTO: 4.33 MILLION/UL (ref 3.88–5.62)
SODIUM SERPL-SCNC: 139 MMOL/L (ref 135–147)
WBC # BLD AUTO: 11.72 THOUSAND/UL (ref 4.31–10.16)

## 2023-04-04 RX ADMIN — SENNOSIDES 17.2 MG: 8.6 TABLET, FILM COATED ORAL at 08:47

## 2023-04-04 RX ADMIN — DOCUSATE SODIUM 100 MG: 100 CAPSULE, LIQUID FILLED ORAL at 08:47

## 2023-04-04 RX ADMIN — DEXAMETHASONE 2 MG: 0.5 TABLET ORAL at 08:47

## 2023-04-04 RX ADMIN — LOSARTAN POTASSIUM 50 MG: 50 TABLET, FILM COATED ORAL at 08:47

## 2023-04-04 RX ADMIN — Medication 3 MG: at 21:12

## 2023-04-04 RX ADMIN — ACETAMINOPHEN 975 MG: 325 TABLET ORAL at 02:39

## 2023-04-04 RX ADMIN — PANTOPRAZOLE SODIUM 40 MG: 40 TABLET, DELAYED RELEASE ORAL at 05:52

## 2023-04-04 RX ADMIN — ATORVASTATIN CALCIUM 40 MG: 40 TABLET, FILM COATED ORAL at 17:21

## 2023-04-04 RX ADMIN — ENOXAPARIN SODIUM 40 MG: 40 INJECTION SUBCUTANEOUS at 08:47

## 2023-04-04 RX ADMIN — AMLODIPINE BESYLATE 10 MG: 10 TABLET ORAL at 08:47

## 2023-04-04 RX ADMIN — DOCUSATE SODIUM 100 MG: 100 CAPSULE, LIQUID FILLED ORAL at 17:21

## 2023-04-04 NOTE — CASE MANAGEMENT
Tx team recommendations reviewed with patient & spouse, who expressed understanding & agreement  Target DC date is 4/11 with outpatient therapy; a list of providers was provided to Pt & a referral will be made to [place] [address] based on Pt & spouse preference  SW will continue to monitor & assist as needed with Tx & DC planning

## 2023-04-04 NOTE — PCC SPEECH THERAPY
4/4 Pt noticing some difficulty with concentration as well as 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  Also PT observed some difficulty with higher level attention tasks during sessions which patient feels is changed from baseline  Completed RBANS assessment this morning, full results to follow  Therapy recs to follow  4/11 cognitive- linguistic assessment complete  CURRENT FIM LEVELS   Comprehension   Comprehension (FIM) 6 - Understands complex/abstract but requires more time   Expression   Expression (FIM) 6 - Expresses complex/abstract but requires:  more time   Social Interaction   Social Interaction (FIM) 7 - Interacts appropriately without assistive device, medication or helper   Problem Solving   Problem solving (FIM) 6 - Solves complex problems BUT requires extra time   Memory   Memory (FIM) 6 - Recognizes with extra time       RBANS Form: A administered 4/4/23      Cognitive Domain/Subtest: Index Score: Percentile Rank: Classification:   IMMEDIATE MEMORY 103 58%ile Average        1  List Learning (27/40)        2  Story Memory (19/24)       VISUOSPATIAL/  CONSTRUCTIONAL 109 73%ile Average        3  Figure Copy (20/20)        4  Line Orientation (16/20)       LANGUAGE 85 16%ile Low Average        5  Picture Naming (10/10)        6  Semantic Fluency (13/40)       ATTENTION 91 27%ile Average        7  Digit Span (10/16)        8  Coding (37/89)       DELAYED MEMORY 112 79%ile High Average        9  List Recall (9/10)        10  List Recognition (20/20)        11  Story Recall (10/12)        12   Figure Recall (16/20)            Sum of Index Scores:  500   Total Score:  100   Percentile: 50%ile   Classification: Average

## 2023-04-04 NOTE — PROGRESS NOTES
[facility] DAILY TREATMENT NOTE    04/04/23 0900   Pain Assessment   Pain Assessment Tool 0-10   Pain Score No Pain   Restrictions/Precautions   Precautions Bed/chair alarms; Fall Risk;Seizure   Cognitive Linguisitic Assessments   Cognitive Linquistic Quick Test (CLQT) RBANS assessment completed    The Repeatable Battery for the Assessment of Neuropsychological Status (RBANS) is a brief, individually-administered assessment which measures attention, language, visuospatial/constructional abilities, and immediate & delayed memory  The RBANS is intended for use with adolescents to adults, ages 15 to 80 years  The following results were obtained during the administration of the assessment  Form: A    Cognitive Domain/Subtest: Index Score: Percentile Rank: Classification:   IMMEDIATE MEMORY 103 58%ile Average        1  List Learning (27/40)        2  Story Memory (19/24)       VISUOSPATIAL/  CONSTRUCTIONAL 109 73%ile Average        3  Figure Copy (20/20)        4  Line Orientation (16/20)       LANGUAGE 85 16%ile Low Average        5  Picture Naming (10/10)        6  Semantic Fluency (13/40)       ATTENTION 91 27%ile Average        7  Digit Span (10/16)        8  Coding (37/89)       DELAYED MEMORY 112 79%ile High Average        9  List Recall (9/10)        10  List Recognition (20/20)        11  Story Recall (10/12)        12  Figure Recall (16/20)         Sum of Index Scores:  500   Total Score:  100   Percentile: 50%ile   Classification: Average        Comprehension   Assist Devices Glasses   Memory Skills   Orientation Level Oriented X4   Speech/Language/Cognition Assessmetn   Treatment Assessment Pt noticing some difficulty with concentration as well as mental fatigue while completing taxes  Also PT observed some difficulty with higher level attention tasks during sessions which patient feels is changed from baseline     SLP Therapy Minutes   SLP Time In 0900   SLP Time Out 1000   SLP Total Time (minutes) 60 SLP Mode of treatment - Individual (minutes) 60   SLP Mode of treatment - Concurrent (minutes) 0   SLP Mode of treatment - Group (minutes) 0   SLP Mode of treatment - Co-treat (minutes) 0   SLP Mode of Treatment - Total time(minutes) 60 minutes   SLP Cumulative Minutes 90

## 2023-04-04 NOTE — PCC NURSING
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  Aox4, glasses, mild [place], HRR, Lungs clear/diminished on RA  Transfers contact guard with RW  Fall and Seizure precautions  Follow-up MRI scheduled 4-6-23         4/10/23  69 yr old male admitted from [facility] with brain mass  CTA head showed right frontal lobe lesion consistent with mets  3-23-23 Right frontal craniotomy at [facility]  Staples removed (4/6/23), incision with dried blood noted  RONEL no drainage  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

## 2023-04-04 NOTE — PROGRESS NOTES
04/04/23 0650   Pain Assessment   Pain Assessment Tool 0-10   Pain Score No Pain   Restrictions/Precautions   Precautions Bed/chair alarms; Fall Risk;Seizure   Cognition   Overall Cognitive Status WFL   Subjective   Subjective Patient reports he tried sleeping on his stomach last night, had some pain, but did well     Roll Left and Right   Type of Assistance Needed Independent   Physical Assistance Level No physical assistance   Roll Left and Right CARE Score 6   Lying to Sitting on Side of Bed   Type of Assistance Needed Independent   Physical Assistance Level No physical assistance   Lying to Sitting on Side of Bed CARE Score 6   Sit to Stand   Type of Assistance Needed Supervision   Physical Assistance Level No physical assistance   Comment Cues for hand placement   Sit to Stand CARE Score 4   Bed-Chair Transfer   Type of Assistance Needed Supervision   Physical Assistance Level No physical assistance   Comment Cues for hand placement   Chair/Bed-to-Chair Transfer CARE Score 4   Car Transfer   Reason if not Attempted Environmental limitations   Car Transfer CARE Score 10   Walk 10 Feet   Type of Assistance Needed Supervision   Physical Assistance Level No physical assistance   Comment No AD, cues for postural correction and step length   Walk 10 Feet CARE Score 4   Walk 50 Feet with Two Turns   Type of Assistance Needed Supervision   Physical Assistance Level No physical assistance   Comment Trek poles, cues for turns, sequencing   Walk 50 Feet with Two Turns CARE Score 4   Walk 150 Feet   Type of Assistance Needed Supervision   Physical Assistance Level No physical assistance   Comment Trek poles, Cl S with turns   Walk 150 Feet CARE Score 4   Walking 10 Feet on Uneven Surfaces   Type of Assistance Needed Incidental touching   Physical Assistance Level 25% or less   Comment green foam, trek poles x 1, no AD x 1   Walking 10 Feet on Uneven Surfaces CARE Score 3   Ambulation   Primary Mode of Locomotion Prior to Admission Walk   Does the patient walk? 2  Yes   Wheel 50 Feet with Two Turns   Type of Assistance Needed Independent   Physical Assistance Level No physical assistance   Wheel 50 Feet with Two Turns CARE Score 6   Wheel 150 Feet   Type of Assistance Needed Independent   Physical Assistance Level No physical assistance   Wheel 150 Feet CARE Score 6   Wheelchair mobility   Does the patient use a wheelchair? 1  Yes   Type of Wheelchair Used 1  Manual   Method Right upper extremity; Left upper extremity;Right lower extremity; Left lower extremity   Distance Level Surface (feet) 200 ft   Curb or Single Stair   Style negotiated Single stair   Type of Assistance Needed Supervision;Verbal cues   Physical Assistance Level No physical assistance   1 Step (Curb) CARE Score 4   4 Steps   Type of Assistance Needed Supervision   Physical Assistance Level No physical assistance   4 Steps CARE Score 4   12 Steps   Type of Assistance Needed Supervision   Physical Assistance Level No physical assistance   Comment Cl S bilateral HRs, 14 steps   12 Steps CARE Score 4   Picking Up Object   Reason if not Attempted Safety concerns   Picking Up Object CARE Score 88   Therapeutic Interventions   Strengthening Standing TE with trek poles, step ups   Neuromuscular Re-Education Seated balance on red disc, blue foam   Other Side stepping with trek poles, sequence provided verbally   Assessment   Treatment Assessment Patient presents agreeable to PT this a m  Motivated to improve  Noted with struggles during divided attention tasks  Difficulty with left/right UE/LE coordination during alternating step  In need of ongoing interventions to maximize return to PLOF  Problem List Decreased strength;Decreased range of motion;Decreased endurance; Impaired balance;Decreased safety awareness;Decreased skin integrity   PT Barriers   Physical Impairment Decreased strength;Decreased range of motion;Decreased endurance; Impaired balance;Decreased mobility; Decreased safety awareness   Functional Limitation Walking;Transfers;Standing;Stair negotiation;Ramp negotiation   Plan   Treatment/Interventions Functional transfer training;LE strengthening/ROM; Elevations; Therapeutic exercise; Endurance training;Patient/family training;Equipment eval/education;Cognitive reorientation; Bed mobility;Gait training   Progress Progressing toward goals   PT Therapy Minutes   PT Time In 0650   PT Time Out 0820   PT Total Time (minutes) 90   PT Mode of treatment - Individual (minutes) 0   PT Mode of treatment - Concurrent (minutes) 90   PT Mode of treatment - Group (minutes) 0   PT Mode of treatment - Co-treat (minutes) 0   PT Mode of Treatment - Total time(minutes) 90 minutes   PT Cumulative Minutes 360     Side stepping with trek poles - left/right poles and side stepping  Seated on disc - trunk/coordination, UE fwd, lateral, overhead reach  Feet on foam     Patient remains OOB in chair, all needs in reach  Alarm in place and activated  Encouraged use of call bell, patient verbalizes understanding

## 2023-04-04 NOTE — TEAM CONFERENCE
Acute RehabilitationTeam Conference Note  Date: 4/4/2023   Time: 10:49 AM       Patient Name:  Ny Dubois       Medical Record Number: [id number]   YOB: 1955  Sex: Male          Room/Bed:  /[facility] 219-01  Payor Info:  Payor: Mark Melton / Plan: Nova Bruins / Product Type: Blue Fee for Service /      Admitting Diagnosis: Brain mass [G93 [id number]]   Admit Date/Time:  3/30/2023  1:31 PM  Admission Comments: No comment available     Primary Diagnosis:  Brain mass  Principal Problem: Brain mass    Patient Active Problem List    Diagnosis Date Noted   • Mcclelland catheter in place 03/24/2023   • Lung nodule 03/21/2023   • Brain mass 03/20/2023   • Brain compression ([facility] [address] ) 03/20/2023   • Cerebral edema ([facility] [address] ) 03/20/2023   • Hypercholesterolemia 09/20/2021   • CVA (cerebral vascular accident) ([facility] [address] ) 08/12/2021   • Essential hypertension 09/11/2020   • Annual physical exam 09/11/2020   • Negative depression screening 02/24/2020   • Overweight (BMI 25 0-29 9) 02/24/2020       Physical Therapy:    Weight Bearing Status: Full Weight Bearing  Transfers: Supervision, Incidental Touching (Cl S/CGA)  Bed Mobility: Supervision  Amulation Distance (ft): 150 feet  Ambulation: Incidental Touching, Supervision  Assistive Device for Ambulation: Roller Walker  Wheelchair Mobility Distance: 200 ft  Wheelchair Mobility: Independent, Supervision  Number of Stairs: 14  Assistive Device for Stairs: Bilateral [employer]  Stair Assistance: Minimal Assistance  Discharge Recommendations: Home with:  [address] [facility] [place] with[de-identified] Family Support, Outpatient Physical Therapy    4/4/2023:  Patient seen for PT during ARU stay  Presents following removal of brain mass and residual deficits with decreased ROM/strength, decreased balance and safety, decreased endurance, and pain     Patient CGA/Cl S bed mobility, CGA/min A transfers with RW, CGA/min A ambulation up to 91 feet on level and unlevel surfaces without AD and 200 feet with RW    min A negotiation of 14 steps with bilateral handrails  Patient would benefit from continued inpatient ARC PT to increase function, safety, and increased independence in prep for safe d/c to home  Occupational Therapy:  Eating: Independent  Grooming: Supervision  Bathing: Incidental Touching, Supervision  Bathing: Incidental Touching, Supervision  Upper Body Dressing: Supervision  Lower Body Dressing: Supervision, Incidental Touching  Toileting: Supervision, Incidental Touching  Tub/Shower Transfer: Incidental Touching  Toilet Transfer: Incidental Touching, Supervision  Cognition: Within Defined Limits  Orientation: Person, Place, Time, Situation  Discharge Recommendations: Home with:  [address] with[de-identified] Family Support, Home Occupational Therapy, Outpatient Occupational Therapy (HHOT vs outpt OT)       4/3/2023: Pt participates in ADLs, transfers/ mobility and BUE therex focusing on neuro senait yessica LUE  Pt requires assist and supervision due to decreased balance, safety, endurance and generalized strength/ ROM yessica of LUE with head incision  Pt is making gains towards goals and current LOF as listed above  Pt will benefit from continued skilled OT services to increase independence with daily tasks  Speech Therapy:  Mode of Communication: Verbal  Cognition:  (pending results from full cognitive-linguistic assessment)     Orientation: Person, Place, Time, Situation  Swallowing: Within Defined Limits  Diet Recommendations: Thin  Discharge Recommendations:  (discharge recs pending therapy)  4/4 Pt noticing some difficulty with concentration as well as 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  Also PT observed some difficulty with higher level attention tasks during sessions which patient feels is changed from baseline  Completed RBANS assessment this morning, full results to follow  Therapy recs to follow         Nursing Notes:  Appetite: Good  Diet Type: Regular/House                      Diet Patient/Family Education Complete: Yes    Type of Wound (LDA):  (incision top of head stapled RONEL)                    Type of Wound Patient/Family Education: Yes  Bladder: 5 - Supervision     Bladder Patient/Family Education: Yes  Bowel: 5 - Supervision     Bowel Patient/Family Education: Yes  Pain Location/Orientation: Location: Head  Pain Score: 0                       Hospital Pain Intervention(s): Medication (See MAR), Repositioned, Rest  Pain Patient/Family Education: Yes  Medication Management/Safety  Safe Administration: Yes    4/3/23  69 yr old male admitted from [facility] with brain mass  CTA head showed right frontal lobe lesion consistent with mets  3-23-23 Right frontal craniotomy at [facility]  Stapled scalp incision with dried blood noted RONEL no draiange (possibly remove staples 4/6 Nidia to check with surgeon prior to removal)  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  Aox4, glasses, mild [place], HRR, Lungs clear/diminished on RA  Transfers contact guard with RW  Fall and Seizure precautions  Follow-up MRI scheduled 4-6-23  Case Management:     Discharge Planning  Living Arrangements: Lives w/ Spouse/significant other  Support Systems: Spouse/significant other  Assistance Needed: outpatient therapy possible  Type of Current Residence: Private residence  Current Home Care Services: No  Initial assessment & orientation to [facility] with Pt & spouse, who expressed understanding & agreement  Pt & spouse reside in a multi-story home, 1 curb step to enter, flight of stairs inside to bedroom; he expressed that he is highly motivated & expects to be able to ambulate stairs  They do have alternate [place] homes they can go to without stairs if needed, but 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 yes  List any modifications to the treatment plan: na    Barriers Interventions   Sp craniotomy, HTN, lung noduole Medical management and oversight   Incision scalp Local care   Decreased balance ADL, transfer, gait training   Decreased ROM and strength L>R Therapeutic exercise, therapeutic activity   Decreased cog ADL, transfer, gait training, ST strategies     Is the patient making expected progress toward goals?  yes  List any update or changes to goals: na    Medical Goals: Patient will be able to manage medical conditions and comorbid conditions with medications and follow up upon completion of rehab program    Weekly Team Goals:   Rehab Team Goals  ADL Team Goal: Patient will be independent with ADLs with least restrictive device upon completion of rehab program  Bowel/Bladder Team Goal: Patient will return to premorbid level for bladder/bowel management upon completion of rehab program  Transfer Team Goal: Patient will be independent with transfers with least restrictive device upon completion of rehab program  Locomotion Team Goal: Patient will be independent with locomotion with least restrictive device upon completion of rehab program  Cognitive Team Goal: Patient will return to premorbid level of cognitive activity upon completion of rehab program     Mod I bed mobility, transfers, and mobility with LRAD  Mod I self care  Mod I/I comprehension, expression, memory, and problem solving     Health and wellness: to be able to return to work, go mountain biking    Discussion: Plan for return home with wife with outpatient PT, OT, and ST    Anticipated Discharge Date:  April 11, 2023

## 2023-04-04 NOTE — PROGRESS NOTES
PM&R PROGRESS NOTE:  Pattie Santos 69 y o  male MRN: [id number]  Unit/Bed#: -01 Encounter: [id number]        Rehabilitation Diagnosis: Impairment of mobility, safety and Activities of Daily Living (ADLs) due to Brain Dysfunction:  02 1  Non-Traumatic    HPI: Pattie Santos is a 69 y o  male with a PMH significant for HTN, prostate cancer and CVA in 2011, who presented to the [address] on 03/19/23 with one week of progressive left sided weakness  CTA with right frontal lobe cystic lesion with surrounding vasogenic edema consistent with metastatic disease  Local mass effect on the right lateral ventricle without significant midline shift  Right upper lobe mass with partially imaged right hilar adenopathy  MRI confirmed  Neurology and neurosurgery consulted  Patient initiated on Keppra and Decadron with plans for surgical resection within the week  Pulmonology consulted with plans for tissue biopsy after neurosurgery  Hem/Onc consulted and recommended outpatient follow up for definitive treatment plan  A full body scan was obtained on 3/22 which showed no metastatic disease  Pt taken to the OR with Dr Torrey Gamez on 03/23 and is s/p right frontal craniotomy  Intraop, patient had melton catheter placement that was difficulty  Keppra ordered post-op for one week for seizure ppx  Decadron taper continued for cerebral edema  Aspirin on hold for at least 2 weeks post-op  Urology consulted and recommended leaving in place for approximately seven days with removal at rehab and eventual follow up as an outpatient  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  Patient evaluated by PT/OT and deemed appropriate for post-acute rehabilitation services  He was accepted to [facility] and arrived on 3/30/23  SUBJECTIVE: Patient seen and evaluated  Noted mild  Headache last night when laying on stomach  Resolved with Tylenol   He admits to mild left knee pain- mostly described as sore after therapy  Does not wish to try anything other than Tylenol at the moment  Updated patient on staple removal for 4/6 prior to MRI  Will obtain photo and send to neurosurgeon tomorrow  ASSESSMENT: Stable, progressing      PLAN:    Rehabilitation  • Functional deficits:  Impaired mobility, self care, left-sided weakness (LE>UE)  • Continue current rehabilitation plan of care to maximize function  • Functional update:   o PT:  Updated below  o OT:  Updated below  o SLP:  Updated below    Physical Therapy Occupational Therapy Speech Therapy   Weight Bearing Status: Full Weight Bearing  Transfers: Supervision, Incidental Touching (Cl S/CGA)  Bed Mobility: Supervision  Amulation Distance (ft): 150 feet  Ambulation: Incidental Touching, Supervision  Assistive Device for Ambulation: Roller Walker  Wheelchair Mobility Distance: 200 ft  Wheelchair Mobility: Independent, Supervision  Number of Stairs: 14  Assistive Device for Stairs: Bilateral Hand Rails  Stair Assistance: Minimal Assistance  Discharge Recommendations: Home with:  [address] with[de-identified] Family Support, Outpatient Physical Therapy   Eating: Independent  Grooming: Supervision  Bathing: Incidental Touching, Supervision  Bathing: Incidental Touching, Supervision  Upper Body Dressing: Supervision  Lower Body Dressing: Supervision, Incidental Touching  Toileting: Supervision, Incidental Touching  Tub/Shower Transfer: Incidental Touching  Toilet Transfer: Incidental Touching, Supervision  Cognition: Within Defined Limits  Orientation: Person, Place, Time, Situation   Mode of Communication: Verbal  Cognition:  (pending results from full cognitive-linguistic assessment)     Orientation: Person, Place, Time, Situation  Swallowing: Within Defined Limits  Diet Recommendations: Thin  Discharge Recommendations:  (discharge recs pending therapy)         This patient was discussed by the Interdisciplinary Team in weekly case conference today   The care of the patient was extensively discussed with all care providers and an appropriate rehabilitation plan was formulated unique for this patient  Barriers were identified preventing progression of therapy and appropriate interventions were discussed with each discipline  Please see the team note for input from all disciplines regarding barriers, intervention, and discharge planning  • Estimated Discharge: 4/11/23 with OP PT/OT/ST      Pain  • Tylenol 975 mg Q8H PRN for mild pain  • Oxycodone 2 5-5 mg Q4H PRN for mod-severe pain    DVT prophylaxis  • Lovenox SQ inj 40 mg daily     Bladder plan  • Continent     Bowel plan  • Continent   • Last BM 4/3/23  • Continue Senna daily, Colace BID, Dulcolax rectal suppository daily PRN    Skin care  · Monitor skin daily  · Apply skin nourishing cream  · Reposition Q2H to offload pressure  · Elevate heels off bed  · Monitor scalp incision site - POD # 11  · Neurosurgery requested photo prior to removal of staples on 4/6      Mcclelland catheter in place  Assessment & Plan  · Mcclelland with difficult insertion intraop x7 days now   · Mcclelland discontinued 3/31 successfully without complication  Voiding trial successful  · Patient now passing urine without complication   · OP urology follow up   · Resolved    Lung nodule  Assessment & Plan  · Noted on imaging   · Has follow up with hem/onc 4/16 for definitive treatment plan    Cerebral edema (HCC)  Assessment & Plan  · Continue Decadron taper     Brain compression (HCC)  Assessment & Plan  · Plan under brain mass    Hypercholesterolemia  Assessment & Plan  · Continue Lipitor 40 mg daily after dinner    Essential hypertension  Assessment & Plan  · Monitor vitals  · Continue Cozaar 50 mg daily and Norvasc 10 mg daily    * Brain mass  Assessment & Plan  · Presented with one week of left sided weakness   · CTA with right frontal lobe cystic lesion with surrounding vasogenic edema consistent with metastatic ds   Local mass effect on the right alteral ventricle "without significant midline shift  Right upper lobe mass with partially imaged right hilar adenopathy  · S/p Right frontal Craniotomy image-guided for tumor resection with Dr Gonzalo Shaw on 3/23/23  · POD #12  · Keppra 500 mg Q12H x1 week for seizure ppx - completed   · Continue Decadron taper for associated cerebral edema - 2 doses left to completion   · Continue to hold Aspirin for at least 2 weeks per neurosurgery   · Will need hem/onc follow up with concern from lung primary disease with RUL mass   · Hem/onc celeste on 4/5 moved to 4/18  · Pt will follow up with Neurosurgery in May  · Acute chomprehensive interdisciplinary inpatient rehabilitation to include intensive skilled therapies as outlines with oversight and management by a rehabilitation Physician Assistant overseen by rehabilitation physician as well as inpatient rehabilitation nursing, case management and weekly interdisciplinary team meeting        Appreciate IM consultants medical co-management  Labs, medications, and imaging personally reviewed  ROS:  A ten point review of systems was completed and pertinent positives are listed in subjective section  All other systems reviewed were negative  Review of Systems   Constitutional: Negative  HENT: Negative  Respiratory: Negative  Negative for shortness of breath  Cardiovascular: Negative  Negative for chest pain  Gastrointestinal: Negative  Negative for abdominal pain and constipation  Genitourinary: Negative  Negative for difficulty urinating  Musculoskeletal: Positive for arthralgias  Left knee sore after therapy   Neurological: Positive for weakness  Psychiatric/Behavioral: Negative  OBJECTIVE:   /76 (BP Location: Right arm)   Pulse 68   Temp 98 1 °F (36 7 °C) (Temporal)   Resp 15   Ht 5' 9\" (1 753 m)   Wt 81 8 kg (180 lb 5 4 oz)   SpO2 96%   BMI 26 63 kg/m²     Physical Exam  Vitals reviewed     Constitutional:       General: He is not in acute " distress  Appearance: He is not ill-appearing  HENT:      Head: Normocephalic  Eyes:      Pupils: Pupils are equal, round, and reactive to light  Cardiovascular:      Rate and Rhythm: Normal rate and regular rhythm  Pulses: Normal pulses  Heart sounds: Normal heart sounds  No murmur heard  Pulmonary:      Effort: Pulmonary effort is normal  No respiratory distress  Breath sounds: Normal breath sounds  Abdominal:      General: Bowel sounds are normal  There is no distension  Palpations: Abdomen is soft  Musculoskeletal:      Right lower leg: No edema  Left lower leg: No edema  Skin:     General: Skin is warm and dry  Neurological:      General: No focal deficit present  Mental Status: He is alert and oriented to person, place, and time  Motor: Weakness present     Psychiatric:         Mood and Affect: Mood normal          Behavior: Behavior normal           Lab Results   Component Value Date    WBC 11 72 (H) 04/04/2023    HGB 13 0 04/04/2023    HCT 39 9 04/04/2023    MCV 92 04/04/2023     04/04/2023     Lab Results   Component Value Date    SODIUM 139 04/04/2023    K 3 7 04/04/2023     04/04/2023    CO2 27 04/04/2023    BUN 15 04/04/2023    CREATININE 0 74 04/04/2023    GLUC 102 04/04/2023    CALCIUM 8 3 (L) 04/04/2023     Lab Results   Component Value Date    INR 1 06 03/24/2023    INR 1 00 03/23/2023    INR 0 96 03/19/2023    PROTIME 14 0 03/24/2023    PROTIME 13 4 03/23/2023    PROTIME 12 8 03/19/2023           Current Facility-Administered Medications:   •  acetaminophen (TYLENOL) tablet 975 mg, 975 mg, Oral, Q8H PRN, Mellisa Romo PA-C, 975 mg at 04/04/23 0239  •  amLODIPine (NORVASC) tablet 10 mg, 10 mg, Oral, Daily, Mellisa Romo PA-C, 10 mg at 04/04/23 0847  •  atorvastatin (LIPITOR) tablet 40 mg, 40 mg, Oral, After Rudie Favre, PA-C, 40 mg at 04/03/23 1709  •  bisacodyl (DULCOLAX) rectal suppository 10 mg, 10 mg, Rectal, Daily PRN, CHARLES GreenwoodC  •  docusate sodium (COLACE) capsule 100 mg, 100 mg, Oral, BID, Jannie Yanes, PA-C, 100 mg at 04/04/23 0847  •  enoxaparin (LOVENOX) subcutaneous injection 40 mg, 40 mg, Subcutaneous, Daily, Jannie Yanes, PA-C, 40 mg at 04/04/23 2259  •  losartan (COZAAR) tablet 50 mg, 50 mg, Oral, Daily, Jannei Yanes PA-C, 50 mg at 04/04/23 0803  •  melatonin tablet 3 mg, 3 mg, Oral, HS, Jannie Yanes, PA-C, 3 mg at 04/03/23 2222  •  ondansetron (ZOFRAN-ODT) dispersible tablet 4 mg, 4 mg, Oral, Q6H PRN, REJI Greenwood-C  •  oxyCODONE (ROXICODONE) IR tablet 5 mg, 5 mg, Oral, Q4H PRN, REJI Greenwood-C  •  oxyCODONE (ROXICODONE) split tablet 2 5 mg, 2 5 mg, Oral, Q4H PRN, Jannie Yanes PA-C  •  pantoprazole (PROTONIX) EC tablet 40 mg, 40 mg, Oral, Early Morning, Jannie Yanes PA-C, 40 mg at 04/04/23 5610  •  senna (SENOKOT) tablet 17 2 mg, 2 tablet, Oral, Daily, Jannie Yanes PA-C, 17 2 mg at 04/04/23 0847    Past Medical History:   Diagnosis Date   • Hypertension    • [facility]   • Rectal bleeding    • Stroke [facility])     2011         Patient Active Problem List    Diagnosis Date Noted   • Mcclelland catheter in place 03/24/2023   • Lung nodule 03/21/2023   • Brain mass 03/20/2023   • Brain compression ([facility] ) 03/20/2023   • Cerebral edema ([facility] ) 03/20/2023   • Hypercholesterolemia 09/20/2021   • CVA (cerebral vascular accident) ([facility] ) 08/12/2021   • Essential hypertension 09/11/2020   • Annual physical exam 09/11/2020   • Negative depression screening 02/24/2020   • Overweight (BMI 25 0-29 9) 02/24/2020          Jannie Yanes PA-C    I have spent a total time of 45 minutes on 04/04/23 in caring for this patient including Diagnostic results, Risks and benefits of tx options, Instructions for management, Impressions, Counseling / Coordination of care, Documenting in the medical record, Reviewing / ordering tests, medicine, procedures  , Obtaining or reviewing history   and Communicating with other healthcare professionals   ** Please Note:  voice to text software may have been used in the creation of this document   Although proof errors in transcription or interpretation are a potential of such software**

## 2023-04-04 NOTE — PROGRESS NOTES
04/04/23 1305   Pain Assessment   Pain Assessment Tool 0-10   Pain Score No Pain   Restrictions/Precautions   Precautions Bed/chair alarms; Fall Risk;Seizure   Weight Bearing Restrictions No   ROM Restrictions No   Sit to Stand   Type of Assistance Needed Supervision   Sit to Stand CARE Score 4   Bed-Chair Transfer   Type of Assistance Needed Supervision; Incidental touching   Chair/Bed-to-Chair Transfer CARE Score 4   Exercise Tools   UE Ergometer 5 minutes forward and backwards with BUE and mod resistance   Other Exercise Tool 1 hanging clothespins with LUE after retrieval from the floor using the reacher in the RUE   Other Exercise Tool 2 Sadnerbox 2 sets 15 all directions with 4# and BUE   Coordination   Fine Motor Purdue pegboard using L hand primarily and including coordination when removing pins and washers holding several in hand placing in the slot one at a time, knots out of tubing with B hands   Cognition   Orientation Level Oriented X4   Additional Activities   Additional Activities Other (Comment)   Additional Activities Comments fxl mobility to and from OT room with RW and S/ CGA   Other Comments   Assessment Pt participates in 55 minutes concurrent treatment focusing on BUE therex focusing on strength and neuroreed with similar goals as another patient   Assessment   Treatment Assessment Pt presents seated in w/c agreeable to OT session including transfers/ mobility and BUE therex focusing on neuro senait LUE yessica during FM tasks  Pt tolerates session without complaints and is making gains towards goals  Pt requires superviison and occ assist due to decreased balance, safety, endurance and generalized strength/ ROM and coordinaiton yessica of LUE  Pt is progressing towards goals and will benefit from conitnued skilled OT services to increase independence with daily tasks  Problem List Decreased strength;Decreased range of motion;Decreased endurance; Impaired balance;Decreased coordination;Decreased safety awareness;Decreased skin integrity   Plan   Treatment/Interventions ADL retraining;Functional transfer training; Therapeutic exercise; Endurance training;Patient/family training;Equipment eval/education; Compensatory technique education   Progress Progressing toward goals   OT Therapy Minutes   OT Time In 1305   OT Time Out 1428   OT Total Time (minutes) 83   OT Mode of treatment - Individual (minutes) 28   OT Mode of treatment - Concurrent (minutes) 55   Therapy Time missed   Time missed?  No

## 2023-04-04 NOTE — PLAN OF CARE
Problem: PAIN - ADULT  Goal: Verbalizes/displays adequate comfort level or baseline comfort level  Description: Interventions:  - Encourage patient to monitor pain and request assistance  - Assess pain using appropriate pain scale  - Administer analgesics based on type and severity of pain and evaluate response  - Implement non-pharmacological measures as appropriate and evaluate response  - Consider cultural and social influences on pain and pain management  - Notify physician/advanced practitioner if interventions unsuccessful or patient reports new pain  Outcome: Progressing     Problem: INFECTION - ADULT  Goal: Absence or prevention of progression during hospitalization  Description: INTERVENTIONS:  - Assess and monitor for signs and symptoms of infection  - Monitor lab/diagnostic results  - Monitor all insertion sites, i e  indwelling lines, tubes, and drains  - Monitor endotracheal if appropriate and nasal secretions for changes in amount and color  - [place] appropriate cooling/warming therapies per order  - Administer medications as ordered  - Instruct and encourage patient and family to use good hand hygiene technique  - Identify and instruct in appropriate isolation precautions for identified infection/condition  Outcome: Progressing     Problem: DISCHARGE PLANNING  Goal: Discharge to home or other facility with appropriate resources  Description: INTERVENTIONS:  - Identify barriers to discharge w/patient and caregiver  - Arrange for needed discharge resources and transportation as appropriate  - Identify discharge learning needs (meds, wound care, etc )  - Arrange for interpretive services to assist at discharge as needed  - Refer to Case Management Department for coordinating discharge planning if the patient needs post-hospital services based on physician/advanced practitioner order or complex needs related to functional status, cognitive ability, or social support system  Outcome: Progressing

## 2023-04-04 NOTE — NURSING NOTE
Patient ambulates contact guard in room with walker  Voices no complaints of pain  Incision stapled intact to top of head  Staples to be removed prior to MRI on 4/6 per PA  Sitting in recliner for all meals  Will continue to monitor and follow plan of care

## 2023-04-04 NOTE — PLAN OF CARE
Problem: PAIN - ADULT  Goal: Verbalizes/displays adequate comfort level or baseline comfort level  Description: Interventions:  - Encourage patient to monitor pain and request assistance  - Assess pain using appropriate pain scale  - Administer analgesics based on type and severity of pain and evaluate response  - Implement non-pharmacological measures as appropriate and evaluate response  - Consider cultural and social influences on pain and pain management  - Notify physician/advanced practitioner if interventions unsuccessful or patient reports new pain  Outcome: Progressing     Problem: INFECTION - ADULT  Goal: Absence or prevention of progression during hospitalization  Description: INTERVENTIONS:  - Assess and monitor for signs and symptoms of infection  - Monitor lab/diagnostic results  - Monitor all insertion sites, i e  indwelling lines, tubes, and drains  - Monitor endotracheal if appropriate and nasal secretions for changes in amount and color  - [place] appropriate cooling/warming therapies per order  - Administer medications as ordered  - Instruct and encourage patient and family to use good hand hygiene technique  - Identify and instruct in appropriate isolation precautions for identified infection/condition  Outcome: Progressing     Problem: SAFETY ADULT  Goal: Maintain or return to baseline ADL function  Description: INTERVENTIONS:  -  Assess patient's ability to carry out ADLs; assess patient's baseline for ADL function and identify physical deficits which impact ability to perform ADLs (bathing, care of mouth/teeth, toileting, grooming, dressing, etc )  - Assess/evaluate cause of self-care deficits   - Assess range of motion  - Assess patient's mobility; develop plan if impaired  - Assess patient's need for assistive devices and provide as appropriate  - Encourage maximum independence but intervene and supervise when necessary  - Involve family in performance of ADLs  - Assess for home care needs following discharge   - Consider OT consult to assist with ADL evaluation and planning for discharge  - Provide patient education as appropriate  Outcome: Progressing

## 2023-04-05 ENCOUNTER — TELEPHONE (OUTPATIENT)
Dept: NEUROSURGERY | Facility: CLINIC | Age: 68
End: 2023-04-05

## 2023-04-05 PROBLEM — Z97.8 FOLEY CATHETER IN PLACE: Status: RESOLVED | Noted: 2023-03-24 | Resolved: 2023-04-05

## 2023-04-05 RX ORDER — SENNOSIDES 8.6 MG
2 TABLET ORAL DAILY PRN
Status: DISCONTINUED | OUTPATIENT
Start: 2023-04-05 | End: 2023-04-11 | Stop reason: HOSPADM

## 2023-04-05 RX ADMIN — ATORVASTATIN CALCIUM 40 MG: 40 TABLET, FILM COATED ORAL at 17:36

## 2023-04-05 RX ADMIN — LOSARTAN POTASSIUM 50 MG: 50 TABLET, FILM COATED ORAL at 08:52

## 2023-04-05 RX ADMIN — AMLODIPINE BESYLATE 10 MG: 10 TABLET ORAL at 08:52

## 2023-04-05 RX ADMIN — ACETAMINOPHEN 975 MG: 325 TABLET ORAL at 21:00

## 2023-04-05 RX ADMIN — ENOXAPARIN SODIUM 40 MG: 40 INJECTION SUBCUTANEOUS at 08:52

## 2023-04-05 RX ADMIN — Medication 3 MG: at 21:01

## 2023-04-05 RX ADMIN — PANTOPRAZOLE SODIUM 40 MG: 40 TABLET, DELAYED RELEASE ORAL at 05:52

## 2023-04-05 NOTE — PLAN OF CARE

## 2023-04-05 NOTE — PROGRESS NOTES
04/05/23 1410   Pain Assessment   Pain Assessment Tool 0-10   Pain Score No Pain   Restrictions/Precautions   Precautions Fall Risk;Seizure   Weight Bearing Restrictions No   ROM Restrictions No   Sit to Stand   Type of Assistance Needed Supervision   Sit to Stand CARE Score 4   Bed-Chair Transfer   Type of Assistance Needed Supervision   Comment recliner removed for use with another aptient with a high back chair to replace and pt able to demonstrate ability to prop legs on EOB while sitting up to rest   Chair/Bed-to-Chair Transfer CARE Score 4   Commmunity Re-entry   Community Re-entry Level Walker   Community Re-entry Level of Assistance Close supervision   Community Re-entry shopping in OT store for grooming supplies with a 7 item list  Pt able to gather in a bag, calculate the total, organize direct payment with tax and return items when activity complete   Exercise Tools   Other Exercise Tool 1 Purdue pegboard L hand, holding and isolating one at a time for removal   Additional Activities   Additional Activities Other (Comment)   Additional Activities Comments fxl mobility to and form OT room with RW and S   Assessment   Treatment Assessment Pt presents sitting in recliner however agreeable to changing seating location to a high back armchair allowing for imprived mobility and HEP completion  Pt requires supervision during session due to decreased balance, safety, endurance and strength/ ROM with improving LUE strength and coordination  Pt tolerates session without complaints and will benefit from continued skilled OT services to increase independence with daily tasks  Problem List Decreased strength;Decreased range of motion;Decreased endurance; Impaired balance;Decreased safety awareness;Decreased skin integrity   Plan   Treatment/Interventions ADL retraining;Functional transfer training; Therapeutic exercise; Endurance training;Patient/family training;Equipment eval/education; Compensatory technique education   Progress Progressing toward goals   OT Therapy Minutes   OT Time In 1410   OT Time Out 1500   OT Total Time (minutes) 50   OT Mode of treatment - Individual (minutes) 50   Therapy Time missed   Time missed?  No

## 2023-04-05 NOTE — NURSING NOTE
Patient has been pleasant and cooperative with all cares this shift  Patient denies ant pain or discomfort this shift  Staples remain intact and are to be removed 4/6 before MRI  Patient has been OOB for all meals  Patient has personal belongings and call bell within reach  Visit #:  6    Subjective:  Miladis Harper is a 24 year old female who is seen in f/u up for:        Segmental and somatic dysfunction of cervical region  Cervicalgia  Segmental and somatic dysfunction of thoracic region  Pain in thoracic spine  Chronic bilateral low back pain without sciatica.     Since last visit on 8/16/2021,  Miladis Harper reports: Following last visit patient noted little if any improvement of symptoms.  States that she has been more active as of recently.  Reports going to [facility] for a family gathering and doing a lot of hiking.  Has been trying to do home therapeutic exercises and stretching.  Does seem to provide some temporary relief when she is able to perform them.    Reports since miscarriage she has been bleeding somewhat.  On Monday patient reports passing a mass, unsure of what this might be.  Does have appointment tomorrow with Karyn Shine NP with [facility] for further evaluation. Patient is following up with Dr. Lupillo LOPEZ next week.    Unsure of any changes in her blood pressure as they have not been checking it recently.    Area of chief complaint:  Cervical :  Symptoms are graded at 3/10. The quality is described as intermittent aching.    Thoracic :  Symptoms are graded at 5/10. The quality is described as frequent aching.    Lumbar :  Symptoms are graded at 8/10. The quality is described as aching, tight and tender.       Objective:  The following was observed:    P: palpatory tenderness Suboccipital ridge bilaterally, T3 on right, T10 midline:    A: static palpation demonstrates intersegmental asymmetry , cervical, thoracic  R: motion palpation notes restricted motion, C1 , C2 , T3  and T10  T: Taut tender fibers noted paraspinal musculature lower thoracic region bilaterally, taut tender fibers suboccipital musculature bilaterally, right rhomboids    Segmental spinal dysfunction/restrictions found at:  :  C1 Left rotation restricted and Right  lateral flexion restricted  C2 Left lateral flexion restricted and Extension restriction  T3 Right lateral flexion restricted and Extension restriction  T10 Extension restriction.      Assessment: Patient is showing some signs of progress.  Segmental/somatic dysfunction of the lumbopelvic region no longer present.  It does appear that low back pain has traveled to more of the TL junction region.  It is quite possible that due to patient's miscarriage we are now dealing with visceral condition resulting in aggravation of low back pain.  It is also quite possible that due to patient's diastases recti any progress made with chiropractic care quickly is lost.  Discussed with patient to talk with primary provider regarding physical therapy.  Plan to follow-up with patient in 1 week.    Diagnoses:      1. Segmental and somatic dysfunction of cervical region    2. Cervicalgia    3. Segmental and somatic dysfunction of thoracic region    4. Pain in thoracic spine    5. Chronic bilateral low back pain without sciatica        Patient's condition:  Patient had restrictions pre-manipulation and Patient symptoms are staying the same    Treatment effectiveness:  Post manipulation there is better intersegmental movement and Patient claims to feel looser post manipulation      Procedures:  CMT:  [id number] Chiropractic manipulative treatment 1-2 regions performed   Cervical: Diversified, C1 , C2, Supine  Thoracic: Diversified, T3, T10, Prone    Modalities:  None performed this visit    Therapeutic procedures:  None    Response to Treatment  Reduction in symptoms as reported by patient    Prognosis: Good    Progress towards Goals: Patient is making progress towards the goal.      Patient will report improved pain.              Patient will report able to recreate without painful limits.              Patient will demonstrate proper use of home care strategies              Patient will demonstrate an improved ability to complete Activities  of Daily Living   as shown by a reported 10-30% reduced score on neck and/or back index.               Patient will demonstrate improved ROM    Recommendations:    Instructions:ice 20 minutes every other hour as needed and heat 15 minutes every other hour as needed    Follow-up:  Return to care in 1 week.

## 2023-04-05 NOTE — PLAN OF CARE
Problem: PAIN - ADULT  Goal: Verbalizes/displays adequate comfort level or baseline comfort level  Description: Interventions:  - Encourage patient to monitor pain and request assistance  - Assess pain using appropriate pain scale  - Administer analgesics based on type and severity of pain and evaluate response  - Implement non-pharmacological measures as appropriate and evaluate response  - Consider cultural and social influences on pain and pain management  - Notify physician/advanced practitioner if interventions unsuccessful or patient reports new pain  Outcome: Progressing     Problem: SAFETY ADULT  Goal: Patient will remain free of falls  Description: INTERVENTIONS:  - Educate patient/family on patient safety including physical limitations  - Instruct patient to call for assistance with activity   - Consult OT/PT to assist with strengthening/mobility   - Keep Call bell within reach  - Keep bed low and locked with side rails adjusted as appropriate  - Keep care items and personal belongings within reach  - Initiate and maintain comfort rounds  - Make Fall Risk Sign visible to staff  - Apply yellow socks and bracelet for high fall risk patients  - Consider moving patient to room near nurses station  Outcome: Progressing    Problem: SAFETY ADULT  Goal: Maintains/Returns to pre admission functional level  Description: INTERVENTIONS:  - Perform BMAT or MOVE assessment daily    - Set and communicate daily mobility goal to care team and patient/family/caregiver     - Collaborate with rehabilitation services on mobility goals if consulted    Problem: DISCHARGE PLANNING  Goal: Discharge to home or other facility with appropriate resources  Description: INTERVENTIONS:  - Identify barriers to discharge w/patient and caregiver  - Arrange for needed discharge resources and transportation as appropriate  - Identify discharge learning needs (meds, wound care, etc )  - Arrange for interpretive services to assist at discharge as needed  - Refer to Case Management Department for coordinating discharge planning if the patient needs post-hospital services based on physician/advanced practitioner order or complex needs related to functional status, cognitive ability, or social support system  Outcome: Progressing     - Out of bed for toileting  - Record patient progress and toleration of activity level   Outcome: Progressing     Problem: Nutrition/Hydration-ADULT  Goal: Nutrient/Hydration intake appropriate for improving, restoring or maintaining nutritional needs  Description: Monitor and assess patient's nutrition/hydration status for malnutrition  Collaborate with interdisciplinary team and initiate plan and interventions as ordered  Monitor patient's weight and dietary intake as ordered or per policy  Utilize nutrition screening tool and intervene as necessary  Determine patient's food preferences and provide high-protein, high-caloric foods as appropriate       INTERVENTIONS:  - Monitor oral intake, urinary output, labs, and treatment plans  - Assess nutrition and hydration status and recommend course of action  - Evaluate amount of meals eaten  - Assist patient with eating if necessary   - Allow adequate time for meals  - Recommend/ encourage appropriate diets, oral nutritional supplements, and vitamin/mineral supplements  - Order, calculate, and assess calorie counts as needed  - Recommend, monitor, and adjust tube feedings and TPN/PPN based on assessed needs  - Assess need for intravenous fluids  - Provide specific nutrition/hydration education as appropriate  - Include patient/family/caregiver in decisions related to nutrition  Outcome: Progressing

## 2023-04-05 NOTE — PROGRESS NOTES
[facility] DAILY TREATMENT NOTE   04/05/23 1330   Pain Assessment   Pain Assessment Tool 0-10   Pain Score 1   Pain Location/Orientation Orientation: Left; Location: Neck   Restrictions/Precautions   Precautions Fall Risk;Seizure   Comprehension   Comprehension (FIM) 6 - Understands complex/abstract but requires more time   Expression   Expression (FIM) 6 - Expresses complex/abstract but requires:  more time   Social Interaction   Social Interaction (FIM) 7 - Interacts appropriately without assistive device, medication or helper   Problem Solving   Problem solving (FIM) 6 - Solves complex problems BUT requires extra time   Memory   Memory (FIM) 6 - Recognizes with extra time   Speech/Language/Cognition Assessmetn   Treatment Assessment reviewed RBANS results with patient, discussed POC  discussed with teach back of compensatory strategies and HEP activities to start targeting during downtime  In session able to alternate sorting a deck of blink cards color/shape/number in 4:15 and dasia  Self recognized 3 errors     SLP Therapy Minutes   SLP Time In 1330   SLP Time Out 1400   SLP Total Time (minutes) 30   SLP Mode of treatment - Individual (minutes) 30   SLP Mode of treatment - Concurrent (minutes) 0   SLP Mode of treatment - Group (minutes) 0   SLP Mode of treatment - Co-treat (minutes) 0   SLP Mode of Treatment - Total time(minutes) 30 minutes   SLP Cumulative Minutes 120

## 2023-04-05 NOTE — TELEPHONE ENCOUNTER
Reached out to [place], as patient is admitted at [facility]  Patient is scheduled for MRI brain w wo tomorrow 4/6 for radiation planning and radiation appointment on 4/12  This RN wanted to ensure patient would be able to have MRI even though he is admitted at [facility], additionally, Hunter Maldonado informed this RN that patient will be discharged on 4/11 so patient will be able to make it to his 4/12 rad onc appointment

## 2023-04-05 NOTE — PROGRESS NOTES
04/05/23 1130   Pain Assessment   Pain Assessment Tool 0-10   Pain Score No Pain   Restrictions/Precautions   Precautions Fall Risk;Seizure   Weight Bearing Restrictions No   ROM Restrictions No   Cognition   Overall Cognitive Status WFL   Subjective   Subjective Patient reports he walked a little extra with OT this a m     Roll Left and Right   Type of Assistance Needed Independent   Physical Assistance Level No physical assistance   Roll Left and Right CARE Score 6   Sit to Lying   Type of Assistance Needed Independent   Physical Assistance Level No physical assistance   Comment bed rails   Sit to Lying CARE Score 6   Lying to Sitting on Side of Bed   Type of Assistance Needed Independent   Physical Assistance Level No physical assistance   Lying to Sitting on Side of Bed CARE Score 6   Sit to Stand   Type of Assistance Needed Supervision   Physical Assistance Level No physical assistance   Comment Completed without AD this date   Sit to Stand CARE Score 4   Bed-Chair Transfer   Type of Assistance Needed Supervision   Physical Assistance Level No physical assistance   Chair/Bed-to-Chair Transfer CARE Score 4   Car Transfer   Reason if not Attempted Environmental limitations   Car Transfer CARE Score 10   Walk 10 Feet   Type of Assistance Needed Supervision   Physical Assistance Level No physical assistance   Comment RW in room   Walk 10 Feet CARE Score 4   Walk 50 Feet with Two Turns   Type of Assistance Needed Supervision   Physical Assistance Level No physical assistance   Comment no AD, cl S with focus on head turns, peripheral scanning   Walk 50 Feet with Two Turns CARE Score 4   Walk 150 Feet   Type of Assistance Needed Supervision   Physical Assistance Level No physical assistance   Comment Cl S,   Walk 150 Feet CARE Score 4   Walking 10 Feet on Uneven Surfaces   Type of Assistance Needed Physical assistance   Physical Assistance Level 25% or less   Comment Green foam, no AD   Walking 10 Feet on Uneven Surfaces CARE Score 3   Ambulation   Primary Mode of Locomotion Prior to Admission Walk   Assist Device   (RW vs  no AD)   Does the patient walk? 2  Yes   Wheel 50 Feet with Two Turns   Type of Assistance Needed Independent   Wheel 50 Feet with Two Turns CARE Score 6   Wheel 150 Feet   Type of Assistance Needed Independent   Wheel 150 Feet CARE Score 6   Wheelchair mobility   Does the patient use a wheelchair? 1  Yes   Type of Wheelchair Used 1  Manual   Curb or Single Stair   Style negotiated Single stair   Type of Assistance Needed Supervision   1 Step (Curb) CARE Score 4   4 Steps   Type of Assistance Needed Supervision   Physical Assistance Level No physical assistance   4 Steps CARE Score 4   12 Steps   Type of Assistance Needed Supervision   Physical Assistance Level No physical assistance   12 Steps CARE Score 4   Picking Up Object   Reason if not Attempted Safety concerns   Picking Up Object CARE Score 88   Toilet Transfer   Comment Not required during session   Therapeutic Interventions   Strengthening Standing LE TE, blue foam   Neuromuscular Re-Education Standing ball scan/take/pass   Other Side stepping on treadmill, fwd/retro stepping, divided attention naming/recall   Assessment   Treatment Assessment Patient presents seated in wheelchair, progressing steadily with activity  Agreeable to PT this date  Patient demonstrates ongoing difficulty with coordination in left UE/LE  Deficits with increased co-contraction of left UE/LE  Remains challenged with high level balance, decreased balance recovery with divided attention tasks  In need of ongoing interventions to maximize return to PLOF  Cont per POC  Problem List Decreased strength;Decreased range of motion;Decreased endurance; Impaired balance;Decreased safety awareness   Plan   Treatment/Interventions Functional transfer training;LE strengthening/ROM; Elevations; Therapeutic exercise; Endurance training;Cognitive reorientation;Patient/family training;Equipment eval/education; Bed mobility;Gait training   Progress Progressing toward goals   PT Therapy Minutes   PT Time In 1130   PT Time Out 1300   PT Total Time (minutes) 90   PT Mode of treatment - Individual (minutes) 30   PT Mode of treatment - Concurrent (minutes) 30   PT Mode of treatment - Group (minutes) 0   PT Mode of treatment - Co-treat (minutes) 0   PT Mode of Treatment - Total time(minutes) 60 minutes   PT Cumulative Minutes 420     Standing LE TE:  B/L LEs, standing on blue foam  March-Knee extn-Hip extn and tap combo x 10  Hip ABd - AROM   HR  TR  Mini Squats    Patient remains OOB in chair, all needs in reach  Alarm in place and activated  Encouraged use of call bell, patient verbalizes understanding

## 2023-04-05 NOTE — PROGRESS NOTES
04/05/23 0950   Pain Assessment   Pain Assessment Tool 0-10   Pain Score No Pain   Restrictions/Precautions   Precautions Fall Risk;Seizure   Weight Bearing Restrictions No   ROM Restrictions No   Oral Hygiene   Type of Assistance Needed Supervision   Comment standing at the sink   Oral Hygiene CARE Score 4   Grooming   Able To Initiate Tasks;Comb/Brush Hair;Wash/Dry Face;Brush/Clean Teeth;Wash/Dry Hands   Limitation Noted In Strength; Safety   Findings supervision   Shower/Bathe Self   Type of Assistance Needed Supervision   Shower/Bathe Self CARE Score 4   Bathing   Assessed Bath Style Shower   Anticipated D/C Bath Style Shower;Sponge Bath   Able to Demetrius Wilmer No   Able to [employer] Temperature No   Able to Wash/Rinse/Dry (body part) Right Arm;Left Arm;L Upper Leg;R Upper Leg;L Lower Leg/Foot;R Lower Leg/Foot;Chest;Abdomen;Perineal Area; Buttocks   Limitations Noted in Balance; Endurance;Problem Solving;ROM;Safety;Strength   Positioning Seated;Standing   Adaptive Equipment Shower Seat;Hand Held Shower; Shower Bars   Tub/Shower Transfer   Limitations Noted In Endurance;Balance;Problem Solving;ROM;Safety;LE Strength   Adaptive Equipment Grab Bars;Seat with Back   Assessed Shower   Findings S/ CGA   Upper Body Dressing   Type of Assistance Needed Set-up / clean-up   Upper Body Dressing CARE Score 5   Lower Body Dressing   Type of Assistance Needed Supervision   Lower Body Dressing CARE Score 4   Putting On/Taking Off Footwear   Type of Assistance Needed Set-up / clean-up   Putting On/Taking Off Footwear CARE Score 5   Picking Up Object   Type of Assistance Needed Supervision   Picking Up Object CARE Score 4   Dressing/Undressing Clothing   Remove UB Clothes [facility] [address]; Undergarment;Socks; Shoes   Don LB Clothes Undergarment;Socks; Shoes   Limitations Noted In Balance; Endurance;Problem Solving; Safety;Strength;ROM   Positioning Supported Sit;Standing Sit to Stand   Type of Assistance Needed Supervision   Sit to Stand CARE Score 4   Bed-Chair Transfer   Type of Assistance Needed Supervision   Chair/Bed-to-Chair Transfer CARE Score 4   Cognition   Orientation Level Oriented X4   Additional Activities   Additional Activities Other (Comment)   Additional Activities Comments fxl mobility to and from shower room Supervision with RW   Other Comments   Assessment Pt participates in 40 minutes concurrent treatment focusing on ADLs and transfers with similar goals as another patient to increase strength and activity tolerance   Assessment   Treatment Assessment Pt presents seated in recliner agreeable to OT session including ADLs and transfers/ mobility  Pt tolerates session without complaints and will benefit from completion of OT session later this day after lunch  Problem List Decreased strength;Decreased range of motion;Decreased endurance; Impaired balance;Decreased safety awareness   Plan   Treatment/Interventions ADL retraining; Therapeutic exercise; Endurance training;Patient/family training;Equipment eval/education; Compensatory technique education   Progress Progressing toward goals   OT Therapy Minutes   OT Time In 0950   OT Time Out 1030   OT Total Time (minutes) 40   OT Mode of treatment - Concurrent (minutes) 40   Therapy Time missed   Time missed?  No

## 2023-04-05 NOTE — PROGRESS NOTES
PM&R PROGRESS NOTE:  Jonathan Carlin 69 y o  male MRN: [id number]  Unit/Bed#: -01 Encounter: [id number]        Rehabilitation Diagnosis: Impairment of mobility, safety and Activities of Daily Living (ADLs) due to Brain Dysfunction:  02 1  Non-Traumatic    HPI: Jonathan Carlin is a 69 y o  male with a PMH significant for HTN, prostate cancer and CVA in 2011, who presented to the [address] on 03/19/23 with one week of progressive left sided weakness  CTA with right frontal lobe cystic lesion with surrounding vasogenic edema consistent with metastatic disease  Local mass effect on the right lateral ventricle without significant midline shift  Right upper lobe mass with partially imaged right hilar adenopathy  MRI confirmed  Neurology and neurosurgery consulted  Patient initiated on Keppra and Decadron with plans for surgical resection within the week  Pulmonology consulted with plans for tissue biopsy after neurosurgery  Hem/Onc consulted and recommended outpatient follow up for definitive treatment plan  A full body scan was obtained on 3/22 which showed no metastatic disease  Pt taken to the OR with Dr Anila Najera on 03/23 and is s/p right frontal craniotomy  Intraop, patient had melton catheter placement that was difficulty  Keppra ordered post-op for one week for seizure ppx  Decadron taper continued for cerebral edema  Aspirin on hold for at least 2 weeks post-op  Urology consulted and recommended leaving in place for approximately seven days with removal at rehab and eventual follow up as an outpatient  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  Patient evaluated by PT/OT and deemed appropriate for post-acute rehabilitation services  He was accepted to [facility] and arrived on 3/30/23  SUBJECTIVE: Patient seen and evaluated  Patient denies further headaches  Still with ongoing soreness to the left knee  Otherwise offers no complains   Informed patient of perla removal in the AM once neurosurgery reviews the image placed in his chart today and he will be going for his MRI tomorrow at [facility] at 1500  ASSESSMENT: Stable, progressing      PLAN:    Rehabilitation  • Functional deficits:  Impaired mobility, self care, left-sided weakness (LE>UE)  • Continue current rehabilitation plan of care to maximize function  • Functional update:   o PT:  Updated below  o OT:  Updated below  o SLP:  Updated below    Physical Therapy Occupational Therapy Speech Therapy   Weight Bearing Status: Full Weight Bearing  Transfers: Supervision, Incidental Touching (Cl S/CGA)  Bed Mobility: Supervision  Amulation Distance (ft): 150 feet  Ambulation: Incidental Touching, Supervision  Assistive Device for Ambulation: Roller Walker  Wheelchair Mobility Distance: 200 ft  Wheelchair Mobility: Independent, Supervision  Number of Stairs: 14  Assistive Device for Stairs: Bilateral Hand Rails  Stair Assistance: Minimal Assistance  Discharge Recommendations: Home with:  [address] with[de-identified] Family Support, Outpatient Physical Therapy   Eating: Independent  Grooming: Supervision  Bathing: Incidental Touching, Supervision  Bathing: Incidental Touching, Supervision  Upper Body Dressing: Supervision  Lower Body Dressing: Supervision, Incidental Touching  Toileting: Supervision, Incidental Touching  Tub/Shower Transfer: Incidental Touching  Toilet Transfer: Incidental Touching, Supervision  Cognition: Within Defined Limits  Orientation: Person, Place, Time, Situation   Mode of Communication: Verbal  Cognition:  (pending results from full cognitive-linguistic assessment)     Orientation: Person, Place, Time, Situation  Swallowing: Within Defined Limits  Diet Recommendations:  Thin  Discharge Recommendations:  (discharge recs pending therapy)       • Estimated Discharge: 4/11/23 with OP PT/OT/ST      Pain  • Tylenol 975 mg Q8H PRN for mild pain  • Oxycodone 2 5-5 mg Q4H PRN for mod-severe pain    DVT prophylaxis  • Lovenox SQ inj 40 mg daily     Bladder plan  • Continent     Bowel plan  • Continent   • Last BM 4/5/23  • Continue Senna daily PRN, Dulcolax rectal suppository daily PRN    Skin care  · Monitor skin daily  · Apply skin nourishing cream  · Reposition Q2H to offload pressure  · Elevate heels off bed  · Monitor scalp incision site   · Photo placed in chart of incision site 4/5  · Will reach out to Neurosurgeon in the AM of 4/6 to confirm we may remove  · Will then place order at that time to remove staples  · For MRI 4/6 at 1500      Lung nodule  Assessment & Plan  · Noted on imaging   · Has follow up with hem/onc 4/16 for definitive treatment plan    Cerebral edema (HCC)  Assessment & Plan  · Decadron taper completed    Brain compression (HCC)  Assessment & Plan  · Plan under brain mass    Hypercholesterolemia  Assessment & Plan  · Continue Lipitor 40 mg daily after dinner    Essential hypertension  Assessment & Plan  · Monitor vitals  · Continue Cozaar 50 mg daily and Norvasc 10 mg daily    * Brain mass  Assessment & Plan  · Presented with one week of left sided weakness   · CTA with right frontal lobe cystic lesion with surrounding vasogenic edema consistent with metastatic ds  Local mass effect on the right alteral ventricle without significant midline shift  Right upper lobe mass with partially imaged right hilar adenopathy  · S/p Right frontal Craniotomy image-guided for tumor resection with Dr Alvin Friedman on 3/23/23     · POD #13  · Keppra - completed   · Decadron taper completed  · Continue to hold Aspirin for at least 2 weeks per neurosurgery   · Will need hem/onc follow up with concern from lung primary disease with RUL mass   · Hem/onc celeste on 4/5 moved to 4/18  · MRI tomorrow 1500  · Photo placed into chart, will reach out to Neurosurgery tomorrow 4/6 and will place order at that time to remove as long as cleared by Neurosurgeon  · Pt will follow up with Neurosurgery in May  · Acute "chomprehensive interdisciplinary inpatient rehabilitation to include intensive skilled therapies as outlines with oversight and management by a rehabilitation Physician Assistant overseen by rehabilitation physician as well as inpatient rehabilitation nursing, case management and weekly interdisciplinary team meeting    Mcclelland catheter in place-resolved as of 4/5/2023  Assessment & Plan  · Mcclelland with difficult insertion intraop x7 days now   · Mcclelland discontinued 3/31 successfully without complication  Voiding trial successful  · Patient now passing urine without complication   · OP urology follow up   · Resolved        Appreciate IM consultants medical co-management  Labs, medications, and imaging personally reviewed  ROS:  A ten point review of systems was completed and pertinent positives are listed in subjective section  All other systems reviewed were negative  Review of Systems   Constitutional: Negative  HENT: Negative  Respiratory: Negative  Negative for shortness of breath  Cardiovascular: Negative  Negative for chest pain  Gastrointestinal: Negative  Negative for abdominal pain and constipation  Genitourinary: Negative  Negative for difficulty urinating  Musculoskeletal: Positive for arthralgias  Left knee sore with therapy   Neurological: Negative  Negative for headaches  Psychiatric/Behavioral: Negative  OBJECTIVE:   /71 (BP Location: Right arm)   Pulse 68   Temp 98 1 °F (36 7 °C) (Temporal)   Resp 16   Ht 5' 9\" (1 753 m)   Wt 79 kg (174 lb 2 6 oz)   SpO2 95%   BMI 25 72 kg/m²     Physical Exam  Vitals reviewed  Constitutional:       General: He is not in acute distress  Appearance: He is not ill-appearing  HENT:      Head: Normocephalic  Comments: Staples intact   Eyes:      Pupils: Pupils are equal, round, and reactive to light  Cardiovascular:      Rate and Rhythm: Normal rate and regular rhythm  Pulses: Normal pulses        " Heart sounds: Normal heart sounds  No murmur heard  Pulmonary:      Effort: Pulmonary effort is normal  No respiratory distress  Breath sounds: Normal breath sounds  Abdominal:      General: Bowel sounds are normal  There is no distension  Palpations: Abdomen is soft  Musculoskeletal:      Right lower leg: No edema  Left lower leg: No edema  Skin:     General: Skin is warm and dry  Neurological:      General: No focal deficit present  Mental Status: He is alert and oriented to person, place, and time     Psychiatric:         Mood and Affect: Mood normal          Behavior: Behavior normal           Lab Results   Component Value Date    WBC 11 72 (H) 04/04/2023    HGB 13 0 04/04/2023    HCT 39 9 04/04/2023    MCV 92 04/04/2023     04/04/2023     Lab Results   Component Value Date    SODIUM 139 04/04/2023    K 3 7 04/04/2023     04/04/2023    CO2 27 04/04/2023    BUN 15 04/04/2023    CREATININE 0 74 04/04/2023    GLUC 102 04/04/2023    CALCIUM 8 3 (L) 04/04/2023     Lab Results   Component Value Date    INR 1 06 03/24/2023    INR 1 00 03/23/2023    INR 0 96 03/19/2023    PROTIME 14 0 03/24/2023    PROTIME 13 4 03/23/2023    PROTIME 12 8 03/19/2023           Current Facility-Administered Medications:   •  acetaminophen (TYLENOL) tablet 975 mg, 975 mg, Oral, Q8H PRN, Sejal Li PA-C, 975 mg at 04/04/23 0239  •  amLODIPine (NORVASC) tablet 10 mg, 10 mg, Oral, Daily, Sejal Jen PA-C, 10 mg at 04/05/23 8223  •  atorvastatin (LIPITOR) tablet 40 mg, 40 mg, Oral, After Cydne Border, PA-C, 40 mg at 04/04/23 1721  •  bisacodyl (DULCOLAX) rectal suppository 10 mg, 10 mg, Rectal, Daily PRN, Sejal Li PA-C  •  enoxaparin (LOVENOX) subcutaneous injection 40 mg, 40 mg, Subcutaneous, Daily, Sejalmichele Li PA-C, 40 mg at 04/05/23 0348  •  losartan (COZAAR) tablet 50 mg, 50 mg, Oral, Daily, Sejal Li PA-C, 50 mg at 04/05/23 0449  •  melatonin tablet 3 mg, 3 mg, Oral, HS, Hanh Cui PA-C, 3 mg at 04/04/23 2112  •  ondansetron (ZOFRAN-ODT) dispersible tablet 4 mg, 4 mg, Oral, Q6H PRN, Hanh Cui PA-C  •  oxyCODONE (ROXICODONE) IR tablet 5 mg, 5 mg, Oral, Q4H PRN, Hanh Cui PA-C  •  oxyCODONE (ROXICODONE) split tablet 2 5 mg, 2 5 mg, Oral, Q4H PRN, Hanh Cui PA-C  •  pantoprazole (PROTONIX) EC tablet 40 mg, 40 mg, Oral, Early Morning, Hanh Cui PA-C, 40 mg at 04/05/23 4741  •  senna (SENOKOT) tablet 17 2 mg, 2 tablet, Oral, Daily PRN, Hanh Cui PA-C    Past Medical History:   Diagnosis Date   • Hypertension    • Prostate cancer ([facility] [address] ) 2001   • Rectal bleeding    • Stroke [facility])     2011         Patient Active Problem List    Diagnosis Date Noted   • Lung nodule 03/21/2023   • Brain mass 03/20/2023   • Brain compression ([facility] [address] ) 03/20/2023   • Cerebral edema ([facility] [address] ) 03/20/2023   • Hypercholesterolemia 09/20/2021   • CVA (cerebral vascular accident) ([facility] [address] ) 08/12/2021   • Essential hypertension 09/11/2020   • Annual physical exam 09/11/2020   • Negative depression screening 02/24/2020   • Overweight (BMI 25 0-29 9) 02/24/2020          Hanh Cui PA-C    I have spent a total time of 35 minutes on 04/05/23 in caring for this patient including Instructions for management, Patient and family education, Counseling / Coordination of care, Documenting in the medical record, Obtaining or reviewing history   and Communicating with other healthcare professionals   ** Please Note:  voice to text software may have been used in the creation of this document   Although proof errors in transcription or interpretation are a potential of such software**

## 2023-04-06 ENCOUNTER — HOSPITAL ENCOUNTER (OUTPATIENT)
Dept: MRI IMAGING | Facility: HOSPITAL | Age: 68
End: 2023-04-06
Attending: NEUROLOGICAL SURGERY

## 2023-04-06 DIAGNOSIS — G93.89 BRAIN MASS: ICD-10-CM

## 2023-04-06 RX ORDER — LIDOCAINE HYDROCHLORIDE 20 MG/ML
JELLY TOPICAL ONCE
Status: COMPLETED | OUTPATIENT
Start: 2023-04-06 | End: 2023-04-06

## 2023-04-06 RX ADMIN — ATORVASTATIN CALCIUM 40 MG: 40 TABLET, FILM COATED ORAL at 17:59

## 2023-04-06 RX ADMIN — AMLODIPINE BESYLATE 10 MG: 10 TABLET ORAL at 08:35

## 2023-04-06 RX ADMIN — LIDOCAINE HYDROCHLORIDE 5 APPLICATION.: 20 JELLY TOPICAL at 10:50

## 2023-04-06 RX ADMIN — GADOBUTROL 8 ML: 604.72 INJECTION INTRAVENOUS at 16:03

## 2023-04-06 RX ADMIN — PANTOPRAZOLE SODIUM 40 MG: 40 TABLET, DELAYED RELEASE ORAL at 05:33

## 2023-04-06 RX ADMIN — ENOXAPARIN SODIUM 40 MG: 40 INJECTION SUBCUTANEOUS at 08:35

## 2023-04-06 RX ADMIN — Medication 3 MG: at 22:10

## 2023-04-06 RX ADMIN — LOSARTAN POTASSIUM 50 MG: 50 TABLET, FILM COATED ORAL at 08:35

## 2023-04-06 NOTE — PROGRESS NOTES
04/06/23 1230   Pain Assessment   Pain Assessment Tool 0-10   Pain Score No Pain   Restrictions/Precautions   Precautions Fall Risk;Seizure   Weight Bearing Restrictions No   ROM Restrictions No   Light Housekeeping   Light Housekeeping Level of Assistance Contact guard   Light Housekeeping pt able to carry tray to cart in the hallway CGA without DME   Exercise Tools   UE Ergometer Biodex BUE 4 minutes forward adjusting isometric levels between 40 and 25 although too much resistance noted to maintain actual level without rest breaks  5 minutes backwards with BUE constant power level 1 with improved tolerance and less resistance noted per pt report  Theraputty FM for retrieval of small objects from red putty with BUE   Cognition   Overall Cognitive Status WFL   Orientation Level Oriented X4   Additional Activities   Additional Activities Other (Comment)   Additional Activities Comments fxl mobility in room and hallway S/CGA without DME; recommend continued use of RW in room to improve stability for progression towards stated goals   Assessment   Treatment Assessment Pt presents seated in armchair agreeable to OT session including BUE therex and FM focusing on L hand coordination  Pt tolerates session with adjustments to tension of Biodex and rest breaks between tasks  Pt requires supervision and assist due to decreased balance, safety, endurance and generalized strength/ ROM/ coordination yessica of LUE  Pt will benefit from conitnued skilled OT services to increase independence with daily tasks  Problem List Decreased strength;Decreased range of motion;Decreased endurance; Impaired balance;Decreased safety awareness;Decreased coordination;Decreased skin integrity   Plan   Treatment/Interventions ADL retraining;Functional transfer training; Therapeutic exercise; Endurance training;Patient/family training;Equipment eval/education; Compensatory technique education   Progress Progressing toward goals   OT Therapy Minutes OT Time In 1230   OT Time Out 1300   OT Total Time (minutes) 30   OT Mode of treatment - Individual (minutes) 30   Therapy Time missed   Time missed?  No

## 2023-04-06 NOTE — NURSING NOTE
"19 staples removed from the head incision without any difficulty  Some scabbing and dried blood covering incision site  Nursing will continue to monitor to ensure all stapled were removed  Patient tolerated well and states \" I felt nothing and im glad they are out\"  "

## 2023-04-06 NOTE — PROGRESS NOTES
04/06/23 1100   Pain Assessment   Pain Assessment Tool 0-10   Pain Score No Pain   Restrictions/Precautions   Precautions Cognitive; Fall Risk;Seizure   Weight Bearing Restrictions No   ROM Restrictions No   Cognition   Overall Cognitive Status WFL   Subjective   Subjective Pt reports he was really tired following the treadmill walking yesterday   Roll Left and Right   Comment Pt OOB   Reason if not Attempted Activity not applicable   Roll Left and Right CARE Score 9   Sit to Lying   Comment Pt OOB   Reason if not Attempted Activity not applicable   Sit to Lying CARE Score 9   Lying to Sitting on Side of Bed   Comment Pt OOB   Reason if not Attempted Activity not applicable   Lying to Sitting on Side of Bed CARE Score 9   Sit to Stand   Type of Assistance Needed Supervision   Physical Assistance Level No physical assistance   Comment No AD this session   Sit to Stand CARE Score 4   Bed-Chair Transfer   Type of Assistance Needed Supervision   Physical Assistance Level No physical assistance   Comment No AD this session   Chair/Bed-to-Chair Transfer CARE Score 4   Car Transfer   Reason if not Attempted Environmental limitations   Car Transfer CARE Score 10   Walk 10 Feet   Type of Assistance Needed Supervision   Physical Assistance Level No physical assistance   Comment Close S without AD   Walk 10 Feet CARE Score 4   Walk 50 Feet with Two Turns   Type of Assistance Needed Supervision   Physical Assistance Level No physical assistance   Comment Close S without AD   Walk 50 Feet with Two Turns CARE Score 4   Walk 150 Feet   Reason if not Attempted Activity not applicable   Walk 796 Feet CARE Score 9   Walking 10 Feet on Uneven Surfaces   Type of Assistance Needed Supervision   Physical Assistance Level No physical assistance   Comment Green foam, no AD   Walking 10 Feet on Uneven Surfaces CARE Score 4   Ambulation   Primary Mode of Locomotion Prior to Admission Walk   Distance Walked (feet) 110 ft  (110', 104' x 2) Gait Pattern Ataxic; Inconsistant [place];Decreased foot clearance;L foot drag; Forward Flexion; Improper weight shift   Limitations Noted In Balance; Coordination; Endurance; Heel Strike;Posture; Safety; Sequencing;Strength   Provided Assistance with: Balance   Walk Assist Level Close Supervision   Findings Close S without AD   Does the patient walk? 2  Yes   Wheel 50 Feet with Two Turns   Reason if not Attempted Activity not applicable   Wheel 50 Feet with Two Turns CARE Score 9   Wheel 150 Feet   Reason if not Attempted Activity not applicable   Wheel 070 Feet CARE Score 9   Wheelchair mobility   Does the patient use a wheelchair? 0  No   Findings Not applicable   Curb or Single Stair   Style negotiated Single stair   Type of Assistance Needed Supervision;Verbal cues   Physical Assistance Level No physical assistance   Comment Close S up/down 18 steps with R handrail; VC for upright posture   1 Step (Curb) CARE Score 4   4 Steps   Type of Assistance Needed Supervision;Verbal cues   Physical Assistance Level No physical assistance   Comment Close S up/down 18 steps with R handrail; VC for upright posture   4 Steps CARE Score 4   12 Steps   Type of Assistance Needed Supervision;Verbal cues   Physical Assistance Level No physical assistance   Comment Close S up/down 18 steps with R handrail; VC for upright posture   12 Steps CARE Score 4   Picking Up Object   Reason if not Attempted Activity not applicable   Picking Up Object CARE Score 9   Toilet Transfer   Reason if not Attempted Activity not applicable   Toilet Transfer CARE Score 9   Therapeutic Interventions   Balance Standing Golfing with Varying Stance and Cognitive Stance   Neuromuscular Re-Education Trek pole side stepping with cognitive task   Other Stair training, gait training, transfer training   Assessment   Treatment Assessment Pt agreeable to PT this AM, received sitting upright in chair   Pt negotiated up/down 18 steps x 2 this session at [facility] using R handrail at S level, requiring VC for maintaining upward gaze and having L UE follow down handrail when going down steps  Challenged pt's movement coordination with divided attention with side stepping using trek poles  Pt experienced one LOB which was self corrected, but required repeated VC for sequencing with trek poles and preventing L foot drag  Will continue with current PT POC to improve deficits and promote return to PLOF  Problem List Decreased strength;Decreased endurance;Decreased range of motion; Impaired balance;Decreased coordination;Decreased safety awareness;Decreased skin integrity   PT Barriers   Physical Impairment Decreased strength;Decreased range of motion;Decreased endurance; Impaired balance;Decreased mobility; Decreased safety awareness   Functional Limitation Walking;Transfers;Standing;Stair negotiation;Ramp negotiation   Plan   Treatment/Interventions Functional transfer training;LE strengthening/ROM; Therapeutic exercise; Endurance training;Cognitive reorientation;Patient/family training;Equipment eval/education; Bed mobility;Gait training   Progress Progressing toward goals   PT Therapy Minutes   PT Time In 1100   PT Time Out 1200   PT Total Time (minutes) 60   PT Mode of treatment - Individual (minutes) 21   PT Mode of treatment - Concurrent (minutes) 39   PT Mode of treatment - Group (minutes) 0   PT Mode of treatment - Co-treat (minutes) 0   PT Mode of Treatment - Total time(minutes) 60 minutes   PT Cumulative Minutes 480     Patient remains OOB in chair, all needs in reach  Alarm in place and activated  Encouraged use of call bell, patient verbalizes understanding

## 2023-04-06 NOTE — PROGRESS NOTES
04/06/23 0715   Pain Assessment   Pain Assessment Tool 0-10   Pain Score No Pain   Restrictions/Precautions   Precautions Fall Risk;Seizure   Weight Bearing Restrictions No   ROM Restrictions No   Oral Hygiene   Type of Assistance Needed Supervision   Comment supervision standing at the sink   Oral Hygiene CARE Score 4   Grooming   Able To Initiate Tasks;Comb/Brush Hair;Wash/Dry Face;Brush/Clean Teeth;Wash/Dry Hands   Limitation Noted In Strength; Safety   Findings supervision standing   Shower/Bathe Self   Type of Assistance Needed Set-up / clean-up   Shower/Bathe Self CARE Score 5   Bathing   Assessed Bath Style Shower   Anticipated D/C Bath Style Shower;Sponge Bath   Able to [place] No   Able to [employer] Temperature No   Able to Wash/Rinse/Dry (body part) Left Arm;Right Arm;L Upper Leg;R Upper Leg;L Lower Leg/Foot;R Lower Leg/Foot;Chest;Abdomen;Perineal Area; Buttocks   Limitations Noted in Balance; Endurance;Problem Solving;ROM;Safety;Strength; Coordination   Positioning Seated;Standing   Adaptive Equipment Shower Bars; Shower Seat;Hand [employer]   Limitations Noted In Balance; Endurance;Problem Solving;ROM;LE Strength; Safety   Adaptive Equipment Seat with Back   Assessed Shower   Findings supervision   Upper Body Dressing   Type of Assistance Needed Set-up / clean-up   Upper Body Dressing CARE Score 5   Lower Body Dressing   Type of Assistance Needed Set-up / clean-up   Lower Body Dressing CARE Score 5   Putting On/Taking Off Footwear   Type of Assistance Needed Set-up / clean-up   Putting On/Taking Off Footwear CARE Score 5   Picking Up Object   Type of Assistance Needed Supervision   Comment supporting with one limb and reaching with the other   Picking Up Object CARE Score 4   Dressing/Undressing Clothing   Remove UB Clothes Pullover Shirt   Don UB Clothes Pullover Shirt   Remove LB Clothes Pants; Undergarment;Socks   Don LB Clothes Pants; Undergarment;Socks; Shoes Limitations Noted In Balance; Endurance;Problem Solving; Safety;Strength; Coordination;ROM   Positioning Supported Sit;Standing   Sit to Stand   Type of Assistance Needed Supervision   Sit to Stand CARE Score 4   Bed-Chair Transfer   Type of Assistance Needed Supervision   Chair/Bed-to-Chair Transfer CARE Score 4   Toileting Hygiene   Type of Assistance Needed Supervision   Toileting Hygiene CARE Score 4   Toileting   Able to [address] down yes, up yes  Manage Hygiene Bladder   Limitations Noted In Balance;Problem Solving;Coordination; Safety;ROM;LE Strength   Adaptive Equipment Grab Bar   Toilet Transfer   Type of Assistance Needed Supervision   Toilet Transfer CARE Score 4   Toilet Transfer   Surface Assessed Raised Toilet   Transfer Technique Standard   Limitations Noted In Balance; Endurance;Problem Solving;ROM;LE Strength; Safety   Adaptive Equipment Grab Bar;Walker   Cognition   Overall Cognitive Status [facility]   Arousal/Participation Alert; Responsive   Attention Within functional limits   Orientation Level Oriented X4   Memory Within functional limits   Following Commands Follows all commands and directions without difficulty   Additional Activities   Additional Activities Other (Comment)   Additional Activities Comments fxl mobility with RW to and from shower room and in room Supervision   Other Comments   Assessment Pt participates in 30 minutes concurrent treatment focusing on ADLs and theract with similar goals as another pat to [facility] strength and activity tolerance   Assessment   Treatment Assessment Pt presents sitting in chair agreeable to OT session including toileting, transfers/ mobility and ADLs  Pt enjoys a shower allowing water to run over incision and completing task with supervision/ setup  Pt requires supervision due to decreased balance, safety, endurance and geenralized strength/ ROM yessica of LUE   Pt is progressing towards goals and will benefit from continued skilled OT servcies to increase independence with daily tasks  Problem List Decreased strength;Decreased endurance;Decreased range of motion; Impaired balance;Decreased coordination;Decreased safety awareness;Decreased skin integrity   Plan   Treatment/Interventions ADL retraining;Functional transfer training; Therapeutic exercise; Endurance training;Patient/family training;Equipment eval/education; Compensatory technique education   Progress Progressing toward goals   OT Therapy Minutes   OT Time In 0715   OT Time Out 0745   OT Total Time (minutes) 30   OT Mode of treatment - Concurrent (minutes) 30   Therapy Time missed   Time missed?  No

## 2023-04-06 NOTE — PROGRESS NOTES
04/06/23 1300   Pain Assessment   Pain Assessment Tool 0-10   Pain Score No Pain   Restrictions/Precautions   Precautions Fall Risk;Seizure   Cognition   Overall Cognitive Status WFL   Subjective   Subjective Patient reports he got tired in OT in his arms  Legs are ok   Sit to Lying   Type of Assistance Needed Independent   Physical Assistance Level No physical assistance   Sit to Lying CARE Score 6   Sit to Stand   Type of Assistance Needed Independent   Physical Assistance Level No physical assistance   Sit to Stand CARE Score 6   Bed-Chair Transfer   Type of Assistance Needed Independent   Physical Assistance Level No physical assistance   Chair/Bed-to-Chair Transfer CARE Score 6   Car Transfer   Reason if not Attempted Environmental limitations   Car Transfer CARE Score 10   Walk 10 Feet   Type of Assistance Needed Independent   Physical Assistance Level No physical assistance   Walk 10 Feet CARE Score 6   Walk 50 Feet with Two Turns   Type of Assistance Needed Independent   Physical Assistance Level No physical assistance   Comment Rollator   Walk 50 Feet with Two Turns CARE Score 6   Walk 150 Feet   Type of Assistance Needed Supervision   Physical Assistance Level No physical assistance   Comment Outdoors, indoors, inclines/declines, rollator   Walk 150 Feet CARE Score 4   Walking 10 Feet on Uneven Surfaces   Type of Assistance Needed Supervision   Physical Assistance Level No physical assistance   Comment Rollator   Walking 10 Feet on Uneven Surfaces CARE Score 4   Ambulation   Primary Mode of Locomotion Prior to Admission Walk   Does the patient walk? 2  Yes   Curb or Single Stair   Style negotiated Single stair   Type of Assistance Needed Supervision   Physical Assistance Level No physical assistance   1 Step (Curb) CARE Score 4   Stairs   Type Curb;Ramp   Assessment   Treatment Assessment Pt presents from OT, agreeable to PT    Initially unsteady with gait, however, progressed to supervision on grass surface  In need of ongoing interventions, difficulty with left foot clearance with divided attention  Cont per POC  Problem List Decreased strength;Decreased range of motion;Decreased endurance; Impaired balance;Decreased safety awareness;Decreased coordination;Decreased skin integrity   PT Barriers   Physical Impairment Decreased strength;Decreased range of motion;Decreased endurance; Impaired balance;Decreased mobility; Decreased safety awareness   Functional Limitation Walking;Transfers;Standing;Stair negotiation;Ramp negotiation   Plan   Treatment/Interventions Functional transfer training;LE strengthening/ROM; Elevations; Therapeutic exercise; Endurance training;Cognitive reorientation;Patient/family training;Equipment eval/education; Bed mobility;Gait training   Progress Progressing toward goals   PT Therapy Minutes   PT Time In 1300   PT Time Out 1330   PT Total Time (minutes) 30   PT Mode of treatment - Individual (minutes) 0   PT Mode of treatment - Concurrent (minutes) 30   PT Mode of treatment - Group (minutes) 0   PT Mode of treatment - Co-treat (minutes) 0   PT Mode of Treatment - Total time(minutes) 30 minutes   PT Cumulative Minutes 510       Patient remains OOB in chair, all needs in reach  Alarm in place and activated  Encouraged use of call bell, patient verbalizes understanding

## 2023-04-06 NOTE — PLAN OF CARE
Problem: PAIN - ADULT  Goal: Verbalizes/displays adequate comfort level or baseline comfort level  Description: Interventions:  - Encourage patient to monitor pain and request assistance  - Assess pain using appropriate pain scale  - Administer analgesics based on type and severity of pain and evaluate response  - Implement non-pharmacological measures as appropriate and evaluate response  - Consider cultural and social influences on pain and pain management  - Notify physician/advanced practitioner if interventions unsuccessful or patient reports new pain  Outcome: Progressing     Problem: INFECTION - ADULT  Goal: Absence or prevention of progression during hospitalization  Description: INTERVENTIONS:  - Assess and monitor for signs and symptoms of infection  - Monitor lab/diagnostic results  - Monitor all insertion sites, i e  indwelling lines, tubes, and drains  - Monitor endotracheal if appropriate and nasal secretions for changes in amount and color  - [place] appropriate cooling/warming therapies per order  - Administer medications as ordered  - Instruct and encourage patient and family to use good hand hygiene technique  - Identify and instruct in appropriate isolation precautions for identified infection/condition  Outcome: Progressing     Problem: SAFETY ADULT  Goal: Patient will remain free of falls  Description: INTERVENTIONS:  - Educate patient/family on patient safety including physical limitations  - Instruct patient to call for assistance with activity   - Consult OT/PT to assist with strengthening/mobility   - Keep Call bell within reach  - Keep bed low and locked with side rails adjusted as appropriate  - Keep care items and personal belongings within reach  - Initiate and maintain comfort rounds  - Make Fall Risk Sign visible to staff  - Apply yellow socks and bracelet for high fall risk patients  - Consider moving patient to room near nurses station  Outcome: Progressing     Problem: DISCHARGE PLANNING  Goal: Discharge to home or other facility with appropriate resources  Description: INTERVENTIONS:  - Identify barriers to discharge w/patient and caregiver  - Arrange for needed discharge resources and transportation as appropriate  - Identify discharge learning needs (meds, wound care, etc )  - Arrange for interpretive services to assist at discharge as needed  - Refer to Case Management Department for coordinating discharge planning if the patient needs post-hospital services based on physician/advanced practitioner order or complex needs related to functional status, cognitive ability, or social support system  Outcome: Progressing     Problem: Prexisting or High Potential for Compromised Skin Integrity  Goal: Skin integrity is maintained or improved  Description: INTERVENTIONS:  - Identify patients at risk for skin breakdown  - Assess and monitor skin integrity  - Assess and monitor nutrition and hydration status  - Monitor labs   - Assess for incontinence   - Turn and reposition patient  - Assist with mobility/ambulation  - Relieve pressure over bony prominences  - Avoid friction and shearing  - Provide appropriate hygiene as needed including keeping skin clean and dry  - Evaluate need for skin moisturizer/barrier cream  - Collaborate with interdisciplinary team   - Patient/family teaching  - Consider wound care consult   Outcome: Progressing

## 2023-04-06 NOTE — PROGRESS NOTES
PM&R PROGRESS NOTE:  Flory Prince 69 y o  male MRN: [id number]  Unit/Bed#: -01 Encounter: [id number]        Rehabilitation Diagnosis: Impairment of mobility, safety and Activities of Daily Living (ADLs) due to Brain Dysfunction:  02 1  Non-Traumatic    HPI: Flory Prince is a 69 y o  male with a PMH significant for HTN, prostate cancer and CVA in 2011, who [id number] on 03/19/23 with one week of progressive left sided weakness  CTA with right frontal lobe cystic lesion with surrounding vasogenic edema consistent with metastatic disease  Local mass effect on the right lateral ventricle without significant midline shift  Right upper lobe mass with partially imaged right hilar adenopathy  MRI confirmed  Neurology and neurosurgery consulted  Patient initiated on Keppra and Decadron with plans for surgical resection within the week  Pulmonology consulted with plans for tissue biopsy after neurosurgery  Hem/Onc consulted and recommended outpatient follow up for definitive treatment plan  A full body scan was obtained on 3/22 which showed no metastatic disease  Pt taken to the OR with Dr Carlos Aguiar on 03/23 and is s/p right frontal craniotomy  Intraop, patient had melton catheter placement that was difficulty  Keppra ordered post-op for one week for seizure ppx  Decadron taper continued for cerebral edema  Aspirin on hold for at least 2 weeks post-op  Urology consulted and recommended leaving in place for approximately seven days with removal at rehab and eventual follow up as an outpatient  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  Patient evaluated by PT/OT and deemed appropriate for post-acute rehabilitation services  He was accepted to [facility] and arrived on 3/30/23  SUBJECTIVE: Patient seen and evaluated  With soreness to LUE specifically the neck/shoulder region   He states he was walking backwards on the treadmill yesterday and thinks he was holding onto the handrails tightly which caused this discomfort  He did take Tylenol which helped  19 staples removed to the scalp  With area of scabbing/dried blood  For MRI today at 1500  ASSESSMENT: Stable, progressing      PLAN:    Rehabilitation  • Functional deficits:  Impaired mobility, self care, left-sided weakness (LE>UE)  • Continue current rehabilitation plan of care to maximize function  • Functional update:   o PT:  Roll L-R: independent   Sit-lying: independent   Lying to sitting on side of bed: independent   Sit-stand: supervision  Bed-chair transfer: supervision  Ambulation: supervision   Wheelchair mobility: independent   Stairs: supervision   o OT:  Oral Hygiene: supervision  Grooming: supervision  Showering/bathing: castanon cu   Tub/shower transfer: supervision   UB dressing: castanon cu   LB dressing: castanon cu   Putting on/taking off footwear: castanon cu   Toileting hygiene: supervision  Toilet transfer: supervision   o SLP:  Comprehension: understands complex/abstract; requires more time  Expression: expresses complex/absract; requires more time  Social interaction: appropriate  Problem solving: solves complex with extra time  Memory: recognizes with extra time      • Estimated Discharge: 4/11/23 with OP PT/OT/ST      Pain  • Tylenol 975 mg Q8H PRN for mild pain  • Oxycodone 2 5-5 mg Q4H PRN for mod-severe pain    DVT prophylaxis  • Lovenox SQ inj 40 mg daily     Bladder plan  • Continent     Bowel plan  • Continent   • Last BM 4/6/23  • Continue Senna daily PRN, Dulcolax rectal suppository daily PRN    Skin care  · Monitor skin daily  · Apply skin nourishing cream  · Reposition Q2H to offload pressure  · Elevate heels off bed  · Monitor scalp incision site   · Photo placed in chart of incision site 4/5  · Neurosurgery notified and cleared for staple removal  · RN removed 19 staples; inspected myself, area healing well with areas of scabbing/dried blood  No further staples needed to be removed  · Continue local wound care   · For MRI 4/6 at 1500      Lung nodule  Assessment & Plan  · Noted on imaging   · Has follow up with hem/onc 4/16 for definitive treatment plan    Cerebral edema (HCC)  Assessment & Plan  · Decadron taper completed    Brain compression (HCC)  Assessment & Plan  · Plan under brain mass    Hypercholesterolemia  Assessment & Plan  · Continue Lipitor 40 mg daily after dinner    Essential hypertension  Assessment & Plan  · Monitor vitals  · Continue Cozaar 50 mg daily and Norvasc 10 mg daily    * Brain mass  Assessment & Plan  · Presented with one week of left sided weakness   · CTA with right frontal lobe cystic lesion with surrounding vasogenic edema consistent with metastatic ds  Local mass effect on the right alteral ventricle without significant midline shift  Right upper lobe mass with partially imaged right hilar adenopathy  · S/p Right frontal Craniotomy image-guided for tumor resection with Dr Elia Leach on 3/23/23  · POD #14  · Keppra - completed   · Decadron taper completed  · Continue to hold Aspirin for at least 2 weeks per neurosurgery   · Will need hem/onc follow up with concern from lung primary disease with RUL mass   · Hem/onc celeste on 4/5 moved to 4/18  · MRI today; 1500  · Staples removed today  · Pt will follow up with Neurosurgery in May  · Acute chomprehensive interdisciplinary inpatient rehabilitation to include intensive skilled therapies as outlines with oversight and management by a rehabilitation Physician Assistant overseen by rehabilitation physician as well as inpatient rehabilitation nursing, case management and weekly interdisciplinary team meeting    Mcclelland catheter in place-resolved as of 4/5/2023  Assessment & Plan  · Mcclelland with difficult insertion intraop x7 days now   · Mcclelland discontinued 3/31 successfully without complication   Voiding trial successful  · Patient now passing urine without complication   · OP urology follow up "  · Resolved        Appreciate IM consultants medical co-management  Labs, medications, and imaging personally reviewed  ROS:  A ten point review of systems was completed and pertinent positives are listed in subjective section  All other systems reviewed were negative  Review of Systems   Constitutional: Negative  HENT: Negative  Respiratory: Negative  Negative for shortness of breath  Cardiovascular: Negative  Negative for chest pain  Gastrointestinal: Negative  Negative for abdominal pain and constipation  Genitourinary: Negative  Negative for difficulty urinating  Musculoskeletal: Positive for myalgias  LUE- neck/shoulder   Neurological: Negative  Negative for headaches  Psychiatric/Behavioral: Negative  OBJECTIVE:   /73 (BP Location: Right arm)   Pulse 83   Temp 97 9 °F (36 6 °C) (Temporal)   Resp 17   Ht 5' 9\" (1 753 m)   Wt 79 2 kg (174 lb 9 7 oz)   SpO2 95%   BMI 25 78 kg/m²     Physical Exam  Vitals reviewed  Constitutional:       General: He is not in acute distress  Appearance: He is not ill-appearing  HENT:      Head: Normocephalic  Comments: Incision site healing well  Staples removed today  Areas of dried blood/scabbing   Eyes:      Pupils: Pupils are equal, round, and reactive to light  Cardiovascular:      Rate and Rhythm: Normal rate and regular rhythm  Pulses: Normal pulses  Heart sounds: Normal heart sounds  No murmur heard  Pulmonary:      Effort: Pulmonary effort is normal  No respiratory distress  Breath sounds: Normal breath sounds  Abdominal:      General: Bowel sounds are normal  There is no distension  Palpations: Abdomen is soft  Musculoskeletal:      Right lower leg: No edema  Left lower leg: No edema  Skin:     General: Skin is warm and dry  Neurological:      General: No focal deficit present  Mental Status: He is alert and oriented to person, place, and time     Psychiatric:   " Mood and Affect: Mood normal          Behavior: Behavior normal           Lab Results   Component Value Date    WBC 11 72 (H) 04/04/2023    HGB 13 0 04/04/2023    HCT 39 9 04/04/2023    MCV 92 04/04/2023     04/04/2023     Lab Results   Component Value Date    SODIUM 139 04/04/2023    K 3 7 04/04/2023     04/04/2023    CO2 27 04/04/2023    BUN 15 04/04/2023    CREATININE 0 74 04/04/2023    GLUC 102 04/04/2023    CALCIUM 8 3 (L) 04/04/2023     Lab Results   Component Value Date    INR 1 06 03/24/2023    INR 1 00 03/23/2023    INR 0 96 03/19/2023    PROTIME 14 0 03/24/2023    PROTIME 13 4 03/23/2023    PROTIME 12 8 03/19/2023           Current Facility-Administered Medications:   •  acetaminophen (TYLENOL) tablet 975 mg, 975 mg, Oral, Q8H PRN, Preeti Walden PA-C, 975 mg at 04/05/23 2100  •  amLODIPine (NORVASC) tablet 10 mg, 10 mg, Oral, Daily, Preeti Walden PA-C, 10 mg at 04/06/23 7639  •  atorvastatin (LIPITOR) tablet 40 mg, 40 mg, Oral, After REJI Luna-C, 40 mg at 04/05/23 1736  •  bisacodyl (DULCOLAX) rectal suppository 10 mg, 10 mg, Rectal, Daily PRN, Preeti Walden PA-C  •  enoxaparin (LOVENOX) subcutaneous injection 40 mg, 40 mg, Subcutaneous, Daily, Preeti Walden PA-C, 40 mg at 04/06/23 8198  •  losartan (COZAAR) tablet 50 mg, 50 mg, Oral, Daily, Preeti Walden PA-C, 50 mg at 04/06/23 7136  •  melatonin tablet 3 mg, 3 mg, Oral, HS, Preeti Walden PA-C, 3 mg at 04/05/23 2101  •  ondansetron (ZOFRAN-ODT) dispersible tablet 4 mg, 4 mg, Oral, Q6H PRN, Preeti Walden PA-C  •  oxyCODONE (ROXICODONE) IR tablet 5 mg, 5 mg, Oral, Q4H PRN, Preeti Walden PA-C  •  oxyCODONE (ROXICODONE) split tablet 2 5 mg, 2 5 mg, Oral, Q4H PRN, Preeti Walden PA-C  •  pantoprazole (PROTONIX) EC tablet 40 mg, 40 mg, Oral, Early Morning, Pereti Walden PA-C, 40 mg at 04/06/23 0533  •  senna (SENOKOT) tablet 17 2 mg, 2 tablet, Oral, Daily PRN, Preeti Walden PA-C    Past Medical History:   Diagnosis Date   • Hypertension    • Prostate cancer ([facility] ) 2001   • Rectal bleeding    • Stroke [facility])     2011         Patient Active Problem List    Diagnosis Date Noted   • Lung nodule 03/21/2023   • Brain mass 03/20/2023   • [facility] compression ([facility] ) 03/20/2023   • Cerebral edema ([facility] ) 03/20/2023   • Hypercholesterolemia 09/20/2021   • CVA (cerebral vascular accident) ([facility] ) 08/12/2021   • Essential hypertension 09/11/2020   • Annual physical exam 09/11/2020   • Negative depression screening 02/24/2020   • Overweight (BMI 25 0-29 9) 02/24/2020          Lyubov Hernandez PA-C    I have spent a total time of 35 minutes on 04/06/23 in caring for this patient including Instructions for management, Patient and family education, Impressions, Counseling / Coordination of care, Documenting in the medical record, Reviewing / ordering tests, medicine, procedures  , Obtaining or reviewing history   and Communicating with other healthcare professionals   ** Please Note:  voice to text software may have been used in the creation of this document   Although proof errors in transcription or interpretation are a potential of such software**

## 2023-04-07 RX ORDER — BISACODYL 10 MG
10 SUPPOSITORY, RECTAL RECTAL DAILY PRN
Status: DISCONTINUED | OUTPATIENT
Start: 2023-04-07 | End: 2023-04-11 | Stop reason: HOSPADM

## 2023-04-07 RX ADMIN — LOSARTAN POTASSIUM 50 MG: 50 TABLET, FILM COATED ORAL at 08:33

## 2023-04-07 RX ADMIN — Medication 3 MG: at 21:00

## 2023-04-07 RX ADMIN — ATORVASTATIN CALCIUM 40 MG: 40 TABLET, FILM COATED ORAL at 17:17

## 2023-04-07 RX ADMIN — PANTOPRAZOLE SODIUM 40 MG: 40 TABLET, DELAYED RELEASE ORAL at 06:03

## 2023-04-07 RX ADMIN — ENOXAPARIN SODIUM 40 MG: 40 INJECTION SUBCUTANEOUS at 08:33

## 2023-04-07 RX ADMIN — AMLODIPINE BESYLATE 10 MG: 10 TABLET ORAL at 08:33

## 2023-04-07 RX ADMIN — ACETAMINOPHEN 975 MG: 325 TABLET ORAL at 02:55

## 2023-04-07 NOTE — PROGRESS NOTES
PM&R PROGRESS NOTE:  Izaiah León 69 y o  male MRN: [id number]  Unit/Bed#: -01 Encounter: [id number]        Rehabilitation Diagnosis: Impairment of mobility, safety and Activities of Daily Living (ADLs) due to Brain Dysfunction:  02 1  Non-Traumatic    HPI: Izaiah León is a 69 y o  male with a PMH significant for HTN, prostate cancer and CVA in 2011, who presented to the [facility] on 03/19/23 with one week of progressive left sided weakness  CTA with right frontal lobe cystic lesion with surrounding vasogenic edema consistent with metastatic disease  Local mass effect on the right lateral ventricle without significant midline shift  Right upper lobe mass with partially imaged right hilar adenopathy  MRI confirmed  Neurology and neurosurgery consulted  Patient initiated on Keppra and Decadron with plans for surgical resection within the week  Pulmonology consulted with plans for tissue biopsy after neurosurgery  Hem/Onc consulted and recommended outpatient follow up for definitive treatment plan  A full body scan was obtained on 3/22 which showed no metastatic disease  Pt taken to the OR with Dr Lory Caal on 03/23 and is s/p right frontal craniotomy  Intraop, patient had melton catheter placement that was difficulty  Keppra ordered post-op for one week for seizure ppx  Decadron taper continued for cerebral edema  Aspirin on hold for at least 2 weeks post-op  Urology consulted and recommended leaving in place for approximately seven days with removal at rehab and eventual follow up as an outpatient  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  Patient evaluated by PT/OT and deemed appropriate for post-acute rehabilitation services  He was accepted to [facility] and arrived on 3/30/23  SUBJECTIVE: Patient seen and evaluated while working with OT  Offers no complaints today  Reviewed MRI results with the patient       ASSESSMENT: Stable, progressing      PLAN:    Rehabilitation  • Functional deficits:  Impaired mobility, self care, left-sided weakness (LE>UE)  • Continue current rehabilitation plan of care to maximize function      • Functional update:   o PT:  Roll L-R: independent   Sit-lying: independent   Lying to sitting on side of bed: independent   Sit-stand: supervision  Bed-chair transfer: supervision  Ambulation: supervision   Wheelchair mobility: independent   Stairs: supervision   o OT:  Oral Hygiene: independent  Eating: independent  Grooming: independent   Showering/bathing: castanon cu   Tub/shower transfer: supervision  UB dressing: castanon cu   LB dressing:castanon cu   Putting on/taking off footwear: castanon cu  Picking up object: independent   Toileting hygiene: independent  Toilet transfer: independent   o SLP:  Comprehension: understands complex/abstract but requires more time  Expression: expresses complex/absract but requires more time  Social interaction: appropriate without assistance   Problem solving: solves complex with extra timre  Memory: recognizes with extra time      • Estimated Discharge: 4/11/23 with OP PT/OT/ST      Pain  • Tylenol 975 mg Q8H PRN for mild pain  • Oxycodone 2 5-5 mg Q4H PRN for mod-severe pain    DVT prophylaxis  • Lovenox SQ inj 40 mg daily     Bladder plan  • Continent     Bowel plan  • Continent   • Last BM 4/6/23  • Continue Senna daily PRN, Dulcolax rectal suppository daily PRN    Skin care  · Monitor skin daily  · Apply skin nourishing cream  · Reposition Q2H to offload pressure  · Elevate heels off bed  · Monitor scalp incision site       Lung nodule  Assessment & Plan  · Noted on imaging   · Has follow up with hem/onc 4/16 for definitive treatment plan    Cerebral edema ([facility] [address] )  Assessment & Plan  · Decadron taper completed    Brain compression ([facility] [address] )  Assessment & Plan  · Plan under brain mass    Hypercholesterolemia  Assessment & Plan  · Continue Lipitor 40 mg daily after dinner    Essential hypertension  Assessment & Plan  · Monitor vitals  · Continue Cozaar 50 mg daily and Norvasc 10 mg daily    * Brain mass  Assessment & Plan  · Presented with one week of left sided weakness   · CTA with right frontal lobe cystic lesion with surrounding vasogenic edema consistent with metastatic ds  Local mass effect on the right alteral ventricle without significant midline shift  Right upper lobe mass with partially imaged right hilar adenopathy  · S/p Right frontal Craniotomy image-guided for tumor resection with Dr Frank Greene on 3/23/23  · POD #15  · Keppra - completed   · Decadron taper completed  · Continue to hold Aspirin for at least 2 weeks per neurosurgery   · Will need hem/onc follow up with concern from lung primary disease with RUL mass   · Hem/onc celeste 4/18  · MRI 04/07/23: Surgical cavity in the right paramedian posterior frontal lobe is stable in size from prior study with blood products noted  Surrounding vasogenic edema has decreased in the interval  Continue follow up imaging recommended  No additional masses are seen  · Staples removed 04/06  · Pt will follow up with Neurosurgery in May  · Acute chomprehensive interdisciplinary inpatient rehabilitation to include intensive skilled therapies as outlines with oversight and management by a rehabilitation Physician Assistant overseen by rehabilitation physician as well as inpatient rehabilitation nursing, case management and weekly interdisciplinary team meeting    Mcclelland catheter in place-resolved as of 4/5/2023  Assessment & Plan  · Mcclelland with difficult insertion intraop x7 days now   · Mcclelland discontinued 3/31 successfully without complication  Voiding trial successful  · Patient now passing urine without complication   · OP urology follow up   · Resolved        Appreciate IM consultants medical co-management  Labs, medications, and imaging personally reviewed        ROS:  A ten point review of systems was completed and pertinent positives are "listed in subjective section  All other systems reviewed were negative  Review of Systems   Constitutional: Negative  HENT: Negative  Respiratory: Negative  Negative for shortness of breath  Cardiovascular: Negative  Negative for chest pain  Gastrointestinal: Negative  Negative for abdominal pain and constipation  Genitourinary: Negative  Negative for difficulty urinating  Musculoskeletal: Negative  Neurological: Negative  Psychiatric/Behavioral: Negative  OBJECTIVE:   /72 (BP Location: Left arm)   Pulse 65   Temp 98 4 °F (36 9 °C) (Temporal)   Resp 16   Ht 5' 9\" (1 753 m)   Wt 80 5 kg (177 lb 7 5 oz)   SpO2 98%   BMI 26 21 kg/m²     Physical Exam  Vitals reviewed  Constitutional:       General: He is not in acute distress  Appearance: He is not ill-appearing  HENT:      Head: Normocephalic and atraumatic  Eyes:      Pupils: Pupils are equal, round, and reactive to light  Cardiovascular:      Rate and Rhythm: Normal rate and regular rhythm  Pulses: Normal pulses  Heart sounds: Normal heart sounds  No murmur heard  Pulmonary:      Effort: Pulmonary effort is normal  No respiratory distress  Breath sounds: Normal breath sounds  Abdominal:      General: Bowel sounds are normal  There is no distension  Palpations: Abdomen is soft  Musculoskeletal:      Right lower leg: No edema  Left lower leg: No edema  Skin:     General: Skin is warm and dry  Neurological:      General: No focal deficit present  Mental Status: He is alert and oriented to person, place, and time     Psychiatric:         Mood and Affect: Mood normal          Behavior: Behavior normal           Lab Results   Component Value Date    WBC 11 72 (H) 04/04/2023    HGB 13 0 04/04/2023    HCT 39 9 04/04/2023    MCV 92 04/04/2023     04/04/2023     Lab Results   Component Value Date    SODIUM 139 04/04/2023    K 3 7 04/04/2023     04/04/2023    CO2 27 " 04/04/2023    BUN 15 04/04/2023    CREATININE 0 74 04/04/2023    GLUC 102 04/04/2023    CALCIUM 8 3 (L) 04/04/2023     Lab Results   Component Value Date    INR 1 06 03/24/2023    INR 1 00 03/23/2023    INR 0 96 03/19/2023    PROTIME 14 0 03/24/2023    PROTIME 13 4 03/23/2023    PROTIME 12 8 03/19/2023           Current Facility-Administered Medications:   •  acetaminophen (TYLENOL) tablet 975 mg, 975 mg, Oral, Q8H PRN, Gailen Marc, PA-C, 975 mg at 04/07/23 0255  •  amLODIPine (NORVASC) tablet 10 mg, 10 mg, Oral, Daily, Gailen Marc, PA-C, 10 mg at 04/07/23 6702  •  atorvastatin (LIPITOR) tablet 40 mg, 40 mg, Oral, After Alli Breaux PA-C, 40 mg at 04/06/23 1759  •  bisacodyl (DULCOLAX) rectal suppository 10 mg, 10 mg, Rectal, Daily PRN, Dario Markham MD  •  enoxaparin (LOVENOX) subcutaneous injection 40 mg, 40 mg, Subcutaneous, Daily, Gailen Marc, PA-C, 40 mg at 04/07/23 8317  •  losartan (COZAAR) tablet 50 mg, 50 mg, Oral, Daily, Gailen Marc, PA-C, 50 mg at 04/07/23 1882  •  melatonin tablet 3 mg, 3 mg, Oral, HS, Gailen Marc, PA-C, 3 mg at 04/06/23 2210  •  ondansetron (ZOFRAN-ODT) dispersible tablet 4 mg, 4 mg, Oral, Q6H PRN, Gailen Marc, PA-C  •  oxyCODONE (ROXICODONE) IR tablet 5 mg, 5 mg, Oral, Q4H PRN, Gailen Marc, PA-C  •  oxyCODONE (ROXICODONE) split tablet 2 5 mg, 2 5 mg, Oral, Q4H PRN, Gailen Marc, PA-C  •  pantoprazole (PROTONIX) EC tablet 40 mg, 40 mg, Oral, Early Morning, Gaileyovany Marc, PA-C, 40 mg at 04/07/23 8257  •  senna (SENOKOT) tablet 17 2 mg, 2 tablet, Oral, Daily PRN, Gaileyovany Marc, PA-C    Past Medical History:   Diagnosis Date   • Hypertension    • Prostate cancer ([facility] [address] ) 2001   • Rectal bleeding    • Stroke [facility])     2011         Patient Active Problem List    Diagnosis Date Noted   • Lung nodule 03/21/2023   • Brain mass 03/20/2023   • Brain compression ([facility] [address] ) 03/20/2023   • [facility] 03/20/2023   • Hypercholesterolemia 09/20/2021   • CVA (cerebral vascular accident) ([facility] [address] ) 08/12/2021   • Essential hypertension 09/11/2020   • Annual physical exam 09/11/2020   • Negative depression screening 02/24/2020   • Overweight (BMI 25 0-29 9) 02/24/2020          Michelle Ponce PA-C    I have spent a total time of 35 minutes on 04/07/23 in caring for this patient including Diagnostic results, Instructions for management, Patient and family education, Impressions, Counseling / Coordination of care, Documenting in the medical record, Reviewing / ordering tests, medicine, procedures  , Obtaining or reviewing history   and Communicating with other healthcare professionals   ** Please Note:  voice to text software may have been used in the creation of this document   Although proof errors in transcription or interpretation are a potential of such software**

## 2023-04-07 NOTE — PROGRESS NOTES
[facility] DAILY TREATMENT NOTE    04/07/23 0934   Pain Assessment   Pain Assessment Tool 0-10   Pain Score No Pain   Restrictions/Precautions   Precautions Seizure; Fall Risk;Cognitive   Comprehension   Comprehension (FIM) 6 - Understands complex/abstract but requires more time   Expression   Expression (FIM) 6 - Expresses complex/abstract but requires:  more time   Social Interaction   Social Interaction (FIM) 7 - Interacts appropriately without assistive device, medication or helper   Problem Solving   Problem solving (FIM) 6 - Solves complex problems BUT requires extra time   Memory   Memory (FIM) 6 - Recognizes with extra time   Speech/Language/Cognition Assessmetn   Treatment Assessment background music [facility] deductive reasoning  puzzle #1 min cue to solve in 10:00  mental manipulation @ 80% dasia  #'s high to low @ 4/4 trials dasia     SLP Therapy Minutes   SLP Time In 99 [id number]   SLP Time Out 1004   SLP Total Time (minutes) 30   SLP Mode of treatment - Individual (minutes) 30   SLP Mode of treatment - Concurrent (minutes) 0   SLP Mode of treatment - Group (minutes) 0   SLP Mode of treatment - Co-treat (minutes) 0   SLP Mode of Treatment - Total time(minutes) 30 minutes   SLP Cumulative Minutes 210

## 2023-04-07 NOTE — PLAN OF CARE
Problem: PAIN - ADULT  Goal: Verbalizes/displays adequate comfort level or baseline comfort level  Description: Interventions:  - Encourage patient to monitor pain and request assistance  - Assess pain using appropriate pain scale  - Administer analgesics based on type and severity of pain and evaluate response  - Implement non-pharmacological measures as appropriate and evaluate response  - Consider cultural and social influences on pain and pain management  - Notify physician/advanced practitioner if interventions unsuccessful or patient reports new pain  Outcome: Progressing     Problem: INFECTION - ADULT  Goal: Absence or prevention of progression during hospitalization  Description: INTERVENTIONS:  - Assess and monitor for signs and symptoms of infection  - Monitor lab/diagnostic results  - Monitor all insertion sites, i e  indwelling lines, tubes, and drains  - Monitor endotracheal if appropriate and nasal secretions for changes in amount and color  - Clio appropriate cooling/warming therapies per order  - Administer medications as ordered  - Instruct and encourage patient and family to use good hand hygiene technique  - Identify and instruct in appropriate isolation precautions for identified infection/condition  Outcome: Progressing     Problem: SAFETY ADULT  Goal: Patient will remain free of falls  Description: INTERVENTIONS:  - Educate patient/family on patient safety including physical limitations  - Instruct patient to call for assistance with activity   - Consult OT/PT to assist with strengthening/mobility   - Keep Call bell within reach  - Keep bed low and locked with side rails adjusted as appropriate  - Keep care items and personal belongings within reach  - Initiate and maintain comfort rounds  - Make Fall Risk Sign visible to staff  - Offer Toileting every 2 Hours, in advance of need  - Initiate/Maintain bed/chair alarm  - Obtain necessary fall risk management equipment: bed/chair alarm  - Apply yellow socks and bracelet for high fall risk patients  - Consider moving patient to room near nurses station  Outcome: Progressing

## 2023-04-07 NOTE — PROGRESS NOTES
04/07/23 1005   Pain Assessment   Pain Assessment Tool 0-10   Pain Score No Pain   Restrictions/Precautions   Precautions Seizure   Weight Bearing Restrictions No   ROM Restrictions No   Sit to Stand   Type of Assistance Needed Independent   Sit to Stand CARE Score 6   Bed-Chair Transfer   Type of Assistance Needed Independent   Chair/Bed-to-Chair Transfer CARE Score 6   Exercise Tools   UE Ergometer Biodex set at 25 isokinetic withb pt able to complete 5 minutes forward and backwards with BUE   Other Exercise Tool 1 pt participates in FM tasks at tabletop painting Easter eggs   Cognition   Orientation Level Oriented X4   Additional Activities   Additional Activities Other (Comment)   Additional Activities Comments fxl mobility with RW in room and hallway Mod I   Assessment   Treatment Assessment Pt participates in OT session focusing on therex and theract  Pt tolerates session without complaints and continues to progress towards goals  Pt will benefit from conitnued skilled OT servcies to increase independence with daily tasks  Problem List Decreased strength;Decreased range of motion;Decreased endurance;Decreased skin integrity   Plan   Treatment/Interventions ADL retraining;Functional transfer training; Therapeutic exercise; Endurance training;Patient/family training;Equipment eval/education; Compensatory technique education   Progress Progressing toward goals   OT Therapy Minutes   OT Time In 1005   OT Time Out 1100   OT Total Time (minutes) 55   OT Mode of treatment - Individual (minutes) 55   Therapy Time missed   Time missed?  No

## 2023-04-07 NOTE — PROGRESS NOTES
04/07/23 0730   Pain Assessment   Pain Assessment Tool 0-10   Pain Score No Pain   Restrictions/Precautions   Precautions Seizure   Weight Bearing Restrictions No   ROM Restrictions No   Eating   Type of Assistance Needed Independent   Eating CARE Score 6   Eating Assessment   Food To Mouth Yes   Able To Cut Yes   Positioning Upright;Out of Bed   Meal Assessed Breakfast   Opens Packages Yes   Oral Hygiene   Type of Assistance Needed Independent   Oral Hygiene CARE Score 6   Grooming   Able To Initiate Tasks; Acquire Items;Comb/Brush Hair;Wash/Dry Face;Brush/Clean Teeth;Wash/Dry Hands   Findings indepednent standing at the sink   Shower/Bathe Self   Type of Assistance Needed Set-up / clean-up   Shower/Bathe Self CARE Score 5   Bathing   Assessed Bath Style Shower   Anticipated D/C Bath Style Shower;Sponge Bath   Able to [place] No   Able to [employer] Temperature Yes   Able to Wash/Rinse/Dry (body part) Left Arm;Right Arm;L Upper Leg;R Upper Leg;L Lower Leg/Foot;R Lower Leg/Foot;Chest;Abdomen;Perineal Area; Buttocks   Limitations Noted in Coordination; Endurance;Strength;ROM   Positioning Seated;Standing   Adaptive Equipment Shower Bars; Shower Seat;Hand [employer]   Limitations Noted In [place] Strength   Adaptive Equipment Seat with Automatic Data Dressing   Type of Assistance Needed Set-up / clean-up   Upper Body Dressing CARE Score 5   Lower Body Dressing   Type of Assistance Needed Set-up / clean-up   Lower Body Dressing CARE Score 5   Putting On/Taking Off Footwear   Type of Assistance Needed Set-up / clean-up   Putting On/Taking Off Footwear CARE Score 5   Picking Up Object   Type of Assistance Needed Independent   Picking Up Object CARE Score 6   Dressing/Undressing Clothing   [facility]B [facility]othes [facility] [address]; Undergarment;Socks   [address] [facility] Pants;Undergarment;Socks   Limitations Noted In Endurance;Strength;ROM   Positioning Supported Sit;Standing   Lying to Sitting on Side of Bed   Type of Assistance Needed Independent   Lying to Sitting on Side of Bed CARE Score 6   Sit to Stand   Type of Assistance Needed Independent   Sit to Stand CARE Score 6   Bed-Chair Transfer   Type of Assistance Needed Independent   Chair/Bed-to-Chair Transfer CARE Score 6   [address] CARE Score 6   Toileting   Able to [address] down yes, up yes  Manage Hygiene Bladder; Bowel   Adaptive Equipment Grab Bar   Toilet Transfer   Type of Assistance Needed Independent   Toilet Transfer CARE Score 6   Toilet Transfer   Surface Assessed Raised Toilet   Transfer Technique Standard   Adaptive Equipment Grab Bar;Walker   Light Housekeeping   Light Housekeeping Level Walker   Light Housekeeping Level of Assistance Close supervision   Light Housekeeping supervision for retrieval of clothing for ADL   Exercise Tools   Other Exercise Tool 1 pt able to assist with filling plastic eggs with small candy to prepare for PT activity   Additional Activities   Additional Activities Other (Comment)   Additional Activities Comments Mod I mobility with RW in room and S longer distances in hallway with RW   Other Comments   Assessment Pt participates in 40 minutes concurrent treatment focusing on ADLs and theract with similar goals as another patient to increase strength/ activity tolerance   Assessment   Treatment Assessment Pt presents ready for OT session includign ADLs, toileting, related transfers/ mobility and tabletop tasks  Pt tolerates session without complaints and is making gaisn towards goals  Pt requires assist due to decreased endurance, ROM/ strength/ coordination  Pt will benefit from continued skilled OT services to increase independence with daily tasks     Problem List Decreased strength;Decreased range of motion;Decreased endurance;Decreased skin integrity   Plan   Treatment/Interventions ADL retraining;Functional transfer training; Therapeutic exercise; Endurance training;Patient/family training;Equipment eval/education; Compensatory technique education   Progress Progressing toward goals   OT Therapy Minutes   OT Time In 0730   OT Time Out 0810   OT Total Time (minutes) 40   OT Mode of treatment - Concurrent (minutes) 40   Therapy Time missed   Time missed?  No

## 2023-04-07 NOTE — NURSING NOTE
Patient is awake and alert  Pleasant with interactions  Now MOD I in room with RW  Participated in therapy sessions as scheduled and tolerated same well  Denies complaints of pain or discomfort  Call bell in reach at bedside

## 2023-04-07 NOTE — PROGRESS NOTES
04/07/23 0900   Pain Assessment   Pain Assessment Tool 0-10   Pain Score No Pain   Restrictions/Precautions   Precautions Cognitive; Fall Risk;Seizure   Weight Bearing Restrictions No   ROM Restrictions No   Cognition   Overall Cognitive Status WFL   Subjective   Subjective Pt reports that he did not drag his L foot once during walking today   Roll Left and Right   Comment Pt OOB   Reason if not Attempted Activity not applicable   Roll Left and Right CARE Score 9   Sit to Lying   Comment Pt OOB   Reason if not Attempted Activity not applicable   Sit to Lying CARE Score 9   Lying to Sitting on Side of Bed   Comment Pt OOB   Reason if not Attempted Activity not applicable   Lying to Sitting on Side of Bed CARE Score 9   Sit to Stand   Type of Assistance Needed Independent   Physical Assistance Level No physical assistance   Comment No AD   Sit to Stand CARE Score 6   Bed-Chair Transfer   Type of Assistance Needed Independent   Physical Assistance Level No physical assistance   Comment no AD   Chair/Bed-to-Chair Transfer CARE Score 6   Car Transfer   Reason if not Attempted Environmental limitations   Car Transfer CARE Score 10   Walk 10 Feet   Type of Assistance Needed Independent   Physical Assistance Level No physical assistance   Comment Close S without AD holding basket with easter eggs    MI with with RW   Walk 10 Feet CARE Score 6   Walk 50 Feet with Two Turns   Type of Assistance Needed Supervision   Physical Assistance Level No physical assistance   Comment Close S without AD holding basket with easter eggs   Walk 50 Feet with Two Turns CARE Score 4   Walk 150 Feet   Type of Assistance Needed Supervision   Physical Assistance Level No physical assistance   Comment Close S without AD holding basket with easter eggs   Walk 150 Feet CARE Score 4   Walking 10 Feet on Uneven Surfaces   Type of Assistance Needed Supervision   Physical Assistance Level No physical assistance   Comment Close S without AD holding basket with easter eggs   Walking 10 Feet on Uneven Surfaces CARE Score 4   Ambulation   Primary Mode of Locomotion Prior to Admission Walk   Distance Walked (feet) 316 ft  (316', 263', 209', 85')   Gait Pattern Ataxic; Inconsistant [place];Decreased foot clearance; Forward Flexion;Decreased L stance; Improper weight shift   Limitations Noted In Balance; Coordination; Endurance; Heel Strike;Posture; Safety; Sequencing;Strength   Provided Assistance with: Balance   Walk Assist Level Close Supervision   Findings Close S without AD holding basket with easter eggs   Does the patient walk? 2  Yes   Wheel 50 Feet with Two Turns   Reason if not Attempted Activity not applicable   Wheel 50 Feet with Two Turns CARE Score 9   Wheel 150 Feet   Reason if not Attempted Activity not applicable   Wheel 597 Feet CARE Score 9   Wheelchair mobility   Does the patient use a wheelchair? 0  No   Findings Not applicable   Curb or Single Stair   Style negotiated Single stair   Type of Assistance Needed Supervision   Physical Assistance Level No physical assistance   Comment Close S up/down 2 steps x 2 while carrying basket   1 Step (Curb) CARE Score 4   4 Steps   Reason if not Attempted Activity not applicable   4 Steps CARE Score 9   12 Steps   Reason if not Attempted Activity not applicable   12 Steps CARE Score 9   Picking Up Object   Type of Assistance Needed Supervision   Physical Assistance Level No physical assistance   Comment Close S to place easter eggs on ground   Picking Up Object CARE Score 4   Toilet Transfer   Reason if not Attempted Activity not applicable   Toilet Transfer CARE Score 9   Therapeutic Interventions   Neuromuscular Re-Education Hiding Easter Eggs (squatting to place on floor, reaching out of ABHINAV, walking while holding basket, etc )   Other gait training, transfer training, stair training   Assessment   Treatment Assessment Pt agreeable to PT this AM, received sitting upright in chair   Pt participated in easter egg hiding activity to challenge dynamic balance, consisting of walking on even/uneven surfaces while carrying a basket, squatting to place eggs onto floor, reaching out of ABHINAV, walking up steps while carrying basket, and other activities  Pt demonstrated decreased L foot drag while walking, despite being challenged with a divided attention task  Pt had slight unsteadiness with reaching out of ABHINAV and squatting but required no physical assistance to correct  Will continue with current PT POC to improve deficits and promote return to PLOF  Problem List Decreased strength;Decreased range of motion;Decreased endurance;Decreased skin integrity   PT Barriers   Physical Impairment Decreased strength;Decreased range of motion;Decreased endurance; Impaired balance;Decreased mobility; Decreased safety awareness   Functional Limitation Walking;Transfers;Standing;Stair negotiation;Ramp negotiation   Plan   Treatment/Interventions Functional transfer training;LE strengthening/ROM; Therapeutic exercise; Endurance training;Patient/family training;Equipment eval/education; Bed mobility;Gait training   Progress Progressing toward goals   PT Therapy Minutes   PT Time In 0900   PT Time Out 0930   PT Total Time (minutes) 30   PT Mode of treatment - Individual (minutes) 0   PT Mode of treatment - Concurrent (minutes) 30   PT Mode of treatment - Group (minutes) 0   PT Mode of treatment - Co-treat (minutes) 0   PT Mode of Treatment - Total time(minutes) 30 minutes   PT Cumulative Minutes 540     Patient remains OOB in chair, all needs in reach  Alarm in place and activated  Encouraged use of call bell, patient verbalizes understanding

## 2023-04-08 RX ADMIN — ACETAMINOPHEN 975 MG: 325 TABLET ORAL at 08:02

## 2023-04-08 RX ADMIN — ATORVASTATIN CALCIUM 40 MG: 40 TABLET, FILM COATED ORAL at 17:30

## 2023-04-08 RX ADMIN — LOSARTAN POTASSIUM 50 MG: 50 TABLET, FILM COATED ORAL at 08:01

## 2023-04-08 RX ADMIN — ENOXAPARIN SODIUM 40 MG: 40 INJECTION SUBCUTANEOUS at 08:02

## 2023-04-08 RX ADMIN — Medication 3 MG: at 21:20

## 2023-04-08 RX ADMIN — AMLODIPINE BESYLATE 10 MG: 10 TABLET ORAL at 08:01

## 2023-04-08 RX ADMIN — PANTOPRAZOLE SODIUM 40 MG: 40 TABLET, DELAYED RELEASE ORAL at 05:24

## 2023-04-08 NOTE — NURSING NOTE
Patient awake and alert  MOD I in room   Particiapted in therapy sessions this AM and tolerated same well  Scalp incision free from signs of infection  Offering no complaints of pain or discomfort   Resting quietly in room

## 2023-04-08 NOTE — QUICK NOTE
PM&R Quick note    Chart reviewed  No acute events reported overnight  Vitals WNL  Last BM 4/8/23, continue current bowel regimen  Continue to follow medical progress      Vitals:    04/08/23 0719   BP: 134/81   Pulse: 91   Resp: 15   Temp: 99 °F (37 2 °C)   SpO2: 96%       Sintia Mullins PA-C  PM&R

## 2023-04-08 NOTE — PROGRESS NOTES
04/08/23 0902   Cognition   Orientation Level Oriented X4   Roll Left and Right   Type of Assistance Needed Independent   Roll Left and Right CARE Score 6   Sit to Lying   Type of Assistance Needed Independent   Sit to Lying CARE Score 6   Lying to Sitting on Side of Bed   Type of Assistance Needed Independent   Lying to Sitting on Side of Bed CARE Score 6   Sit to Stand   Type of Assistance Needed Independent   Sit to Stand CARE Score 6   Bed-Chair Transfer   Type of Assistance Needed Independent   Chair/Bed-to-Chair Transfer CARE Score 6   Transfer Bed/Chair/Wheelchair   Stand Pivot Independent   Sit to Stand Independent   Stand to Sit Independent   Supine to Sit Independent   Sit to Supine Independent   Walk 10 Feet   Type of Assistance Needed Independent   Walk 10 Feet CARE Score 6   Walk 50 Feet with Two Turns   Type of Assistance Needed Independent   Walk 50 Feet with Two Turns CARE Score 6   Walk 150 Feet   Type of Assistance Needed Supervision;Verbal cues   Physical Assistance Level No physical assistance   Walk 150 Feet CARE Score 4   Walking 10 Feet on Uneven Surfaces   Type of Assistance Needed Verbal cues; Supervision   Physical Assistance Level No physical assistance   Walking 10 Feet on Uneven Surfaces CARE Score 4   Ambulation   Distance Walked (feet) 280 ft  (x2)   Assist Device   (No AD)   Limitations Noted In Balance; Coordination   Provided Assistance with: Balance   Walk Assist Level Supervision   Findings Multipleshort ambulation distances to/ from gym with RW   12 Steps   Type of Assistance Needed Verbal cues; Supervision   Physical Assistance Level No physical assistance   12 Steps CARE Score 4   Stairs   Type Stairs   # of Steps 28   Assist Devices Bilateral Rail   Findings Close supervision   Therapeutic Interventions   Strengthening lateral step ups for hip strengthening   Balance airex step overs for balance and coordiantion, lateral cone step overs for L LE strength and stability- difficulty initially with foot clearance   Assessment   Treatment Assessment Patient agreeable to PT treatment  Ambulates with Rw and without AD today, able to maintain good pathway with foot clearance with ambulation  He is challenged with coordination of movement with L LE, improved with more practice  Minimal instability with uneven surfaces and descending control coming off elevations  Transfers mod I   PT Barriers   Physical Impairment Decreased strength;Decreased range of motion;Decreased endurance; Impaired balance;Decreased mobility; Decreased safety awareness;Decreased coordination   Functional Limitation Walking;Transfers;Standing;Stair negotiation;Ramp negotiation   Plan   Treatment/Interventions Functional transfer training;LE strengthening/ROM; Elevations; Therapeutic exercise;Gait training  (balance/ coordination training)   Progress Progressing toward goals   PT Therapy Minutes   PT Time In 1030   PT Time Out 1130   PT Total Time (minutes) 60   PT Mode of treatment - Individual (minutes) 30   PT Mode of treatment - Concurrent (minutes) 30   PT Mode of treatment - Group (minutes) 0   PT Mode of treatment - Co-treat (minutes) 0   PT Mode of Treatment - Total time(minutes) 60 minutes   PT Cumulative Minutes 660   Therapy Time missed   Time missed?  No

## 2023-04-09 RX ADMIN — AMLODIPINE BESYLATE 10 MG: 10 TABLET ORAL at 08:48

## 2023-04-09 RX ADMIN — PANTOPRAZOLE SODIUM 40 MG: 40 TABLET, DELAYED RELEASE ORAL at 05:14

## 2023-04-09 RX ADMIN — ENOXAPARIN SODIUM 40 MG: 40 INJECTION SUBCUTANEOUS at 08:48

## 2023-04-09 RX ADMIN — LOSARTAN POTASSIUM 50 MG: 50 TABLET, FILM COATED ORAL at 08:48

## 2023-04-09 RX ADMIN — Medication 3 MG: at 22:00

## 2023-04-09 RX ADMIN — ATORVASTATIN CALCIUM 40 MG: 40 TABLET, FILM COATED ORAL at 17:44

## 2023-04-09 NOTE — PLAN OF CARE
Problem: PAIN - ADULT  Goal: Verbalizes/displays adequate comfort level or baseline comfort level  Description: Interventions:  - Encourage patient to monitor pain and request assistance  - Assess pain using appropriate pain scale  - Administer analgesics based on type and severity of pain and evaluate response  - Implement non-pharmacological measures as appropriate and evaluate response  - Consider cultural and social influences on pain and pain management  - Notify physician/advanced practitioner if interventions unsuccessful or patient reports new pain  Outcome: Progressing     Problem: INFECTION - ADULT  Goal: Absence or prevention of progression during hospitalization  Description: INTERVENTIONS:  - Assess and monitor for signs and symptoms of infection  - Monitor lab/diagnostic results  - Monitor all insertion sites, i e  indwelling lines, tubes, and drains  - Monitor endotracheal if appropriate and nasal secretions for changes in amount and color  - [place] appropriate cooling/warming therapies per order  - Administer medications as ordered  - Instruct and encourage patient and family to use good hand hygiene technique  - Identify and instruct in appropriate isolation precautions for identified infection/condition  Outcome: Progressing     Problem: SAFETY ADULT  Goal: Maintain or return to baseline ADL function  Description: INTERVENTIONS:  -  Assess patient's ability to carry out ADLs; assess patient's baseline for ADL function and identify physical deficits which impact ability to perform ADLs (bathing, care of mouth/teeth, toileting, grooming, dressing, etc )  - Assess/evaluate cause of self-care deficits   - Assess range of motion  - Assess patient's mobility; develop plan if impaired  - Assess patient's need for assistive devices and provide as appropriate  - Encourage maximum independence but intervene and supervise when necessary  - Involve family in performance of ADLs  - Assess for home care needs following discharge   - Consider OT consult to assist with ADL evaluation and planning for discharge  - Provide patient education as appropriate  Outcome: Progressing     Problem: Prexisting or High Potential for Compromised Skin Integrity  Goal: Skin integrity is maintained or improved  Description: INTERVENTIONS:  - Identify patients at risk for skin breakdown  - Assess and monitor skin integrity  - Assess and monitor nutrition and hydration status  - Monitor labs   - Assess for incontinence   - Turn and reposition patient  - Assist with mobility/ambulation  - Relieve pressure over bony prominences  - Avoid friction and shearing  - Provide appropriate hygiene as needed including keeping skin clean and dry  - Evaluate need for skin moisturizer/barrier cream  - Collaborate with interdisciplinary team   - Patient/family teaching  - Consider wound care consult   Outcome: Progressing     Problem: Nutrition/Hydration-ADULT  Goal: Nutrient/Hydration intake appropriate for improving, restoring or maintaining nutritional needs  Description: Monitor and assess patient's nutrition/hydration status for malnutrition  Collaborate with interdisciplinary team and initiate plan and interventions as ordered  Monitor patient's weight and dietary intake as ordered or per policy  Utilize nutrition screening tool and intervene as necessary  Determine patient's food preferences and provide high-protein, high-caloric foods as appropriate       INTERVENTIONS:  - Monitor oral intake, urinary output, labs, and treatment plans  - Assess nutrition and hydration status and recommend course of action  - Evaluate amount of meals eaten  - Assist patient with eating if necessary   - Allow adequate time for meals  - Recommend/ encourage appropriate diets, oral nutritional supplements, and vitamin/mineral supplements  - Order, calculate, and assess calorie counts as needed  - Recommend, monitor, and adjust tube feedings and TPN/PPN based on assessed needs  - Assess need for intravenous fluids  - Provide specific nutrition/hydration education as appropriate  - Include patient/family/caregiver in decisions related to nutrition  Outcome: Progressing

## 2023-04-09 NOTE — NURSING NOTE
Patient is alert and oriented  Patient is Mod I in his room  Patietn denies any pain or discomfort  Patient is visiting within family and is able to make needs known

## 2023-04-10 PROBLEM — C79.9 METASTATIC ADENOCARCINOMA (HCC): Status: ACTIVE | Noted: 2023-01-01

## 2023-04-10 LAB
ANION GAP SERPL CALCULATED.3IONS-SCNC: 9 MMOL/L (ref 4–13)
BASOPHILS # BLD AUTO: 0.03 THOUSANDS/ΜL (ref 0–0.1)
BASOPHILS NFR BLD AUTO: 0 % (ref 0–1)
BUN SERPL-MCNC: 7 MG/DL (ref 5–25)
CALCIUM SERPL-MCNC: 8.8 MG/DL (ref 8.4–10.2)
CHLORIDE SERPL-SCNC: 103 MMOL/L (ref 96–108)
CO2 SERPL-SCNC: 26 MMOL/L (ref 21–32)
CREAT SERPL-MCNC: 0.71 MG/DL (ref 0.6–1.3)
EOSINOPHIL # BLD AUTO: 0.28 THOUSAND/ΜL (ref 0–0.61)
EOSINOPHIL NFR BLD AUTO: 3 % (ref 0–6)
ERYTHROCYTE [DISTWIDTH] IN BLOOD BY AUTOMATED COUNT: 12.8 % (ref 11.6–15.1)
GFR SERPL CREATININE-BSD FRML MDRD: 95 ML/MIN/1.73SQ M
GLUCOSE P FAST SERPL-MCNC: 130 MG/DL (ref 65–99)
GLUCOSE SERPL-MCNC: 130 MG/DL (ref 65–140)
HCT VFR BLD AUTO: 39.6 % (ref 36.5–49.3)
HGB BLD-MCNC: 12.4 G/DL (ref 12–17)
IMM GRANULOCYTES # BLD AUTO: 0.05 THOUSAND/UL (ref 0–0.2)
IMM GRANULOCYTES NFR BLD AUTO: 1 % (ref 0–2)
LYMPHOCYTES # BLD AUTO: 1.24 THOUSANDS/ΜL (ref 0.6–4.47)
LYMPHOCYTES NFR BLD AUTO: 15 % (ref 14–44)
MCH RBC QN AUTO: 29.1 PG (ref 26.8–34.3)
MCHC RBC AUTO-ENTMCNC: 31.3 G/DL (ref 31.4–37.4)
MCV RBC AUTO: 93 FL (ref 82–98)
MONOCYTES # BLD AUTO: 0.74 THOUSAND/ΜL (ref 0.17–1.22)
MONOCYTES NFR BLD AUTO: 9 % (ref 4–12)
NEUTROPHILS # BLD AUTO: 5.95 THOUSANDS/ΜL (ref 1.85–7.62)
NEUTS SEG NFR BLD AUTO: 72 % (ref 43–75)
NRBC BLD AUTO-RTO: 0 /100 WBCS
PLATELET # BLD AUTO: 173 THOUSANDS/UL (ref 149–390)
PMV BLD AUTO: 9.9 FL (ref 8.9–12.7)
POTASSIUM SERPL-SCNC: 3.9 MMOL/L (ref 3.5–5.3)
RBC # BLD AUTO: 4.26 MILLION/UL (ref 3.88–5.62)
SODIUM SERPL-SCNC: 138 MMOL/L (ref 135–147)
WBC # BLD AUTO: 8.29 THOUSAND/UL (ref 4.31–10.16)

## 2023-04-10 RX ADMIN — Medication 3 MG: at 21:46

## 2023-04-10 RX ADMIN — ENOXAPARIN SODIUM 40 MG: 40 INJECTION SUBCUTANEOUS at 08:10

## 2023-04-10 RX ADMIN — LOSARTAN POTASSIUM 50 MG: 50 TABLET, FILM COATED ORAL at 08:10

## 2023-04-10 RX ADMIN — AMLODIPINE BESYLATE 10 MG: 10 TABLET ORAL at 08:10

## 2023-04-10 RX ADMIN — PANTOPRAZOLE SODIUM 40 MG: 40 TABLET, DELAYED RELEASE ORAL at 05:55

## 2023-04-10 RX ADMIN — ATORVASTATIN CALCIUM 40 MG: 40 TABLET, FILM COATED ORAL at 17:17

## 2023-04-10 NOTE — PROGRESS NOTES
04/10/23 1135 04/10/23 1240   Pain Assessment   Pain Assessment Tool  --  0-10   Pain Score  --  No Pain   Restrictions/Precautions   Precautions  --  Seizure   Weight Bearing Restrictions  --  No   ROM Restrictions  --  No   Sit to Stand   Type of Assistance Needed Independent Independent   Sit to Stand CARE Score 6 6   Bed-Chair Transfer   Type of Assistance Needed Independent Independent   Chair/Bed-to-Chair Transfer CARE Score 6 6   Toileting Hygiene   Type of Assistance Needed  --  Independent   Toileting Hygiene CARE Score  --  6   Toilet Transfer   Type of Assistance Needed  --  Independent   Toilet Transfer CARE Score  --  6   Commmunity Re-entry   Community Re-entry Level of Assistance  --  Modified independent   Community Re-entry  --  shopping activity with 2 lists, calculating totals and gathering correct change with sales tasks   Exercise Tools   Other Exercise Tool 1  --  Biodex 5 minutes forward and backwards with speed set for 25 target and power set for 20 target   Other Exercise Tool 2  --  Purdue pegboard L hand with improved coordination   Coordination   Fine Motor  --  knots out of tubing B hands   Additional Activities   Additional Activities  --  Other (Comment)   Additional Activities Comments  --  fxl mobility short distances without the RW and in the hallway with the RW all Mod I   Assessment   Treatment Assessment  --  Pt presents seated in armchair again eager to participate in OT session  Pt tolerates session with decreased endurance noted and pt educated reagrding pacing and energy conservation as endurance improves  Pt is making gains towards goals and is Mod I for mobility in room and toileting  Pt will beenfit from continued skilled OT services to increase independence with wall tasks  Problem List  --  Decreased strength;Decreased range of motion;Decreased endurance   Plan   Treatment/Interventions  --  ADL retraining;Functional transfer training; Therapeutic exercise; Endurance training;Patient/family training;Equipment eval/education; Compensatory technique education   Progress  --  Progressing toward goals   OT Therapy Minutes   OT Time In 1135 1240   OT Time Out 1200 1350   OT Total Time (minutes) 25 70   OT Mode of treatment - Individual (minutes)  --  70   Therapy Time missed   Time missed?  --  No

## 2023-04-10 NOTE — CASE MANAGEMENT
DC instructions reviewed with Pt & Pt's spouse, who expressed an understanding & agreement  Pt being [address] home tomorrow after 4 PM, being transported by family via car, which tx team has determined to be a safe mode of transport  FU appts indicated on DC instructions, to be scheduled by Pt per scheduling preferences  Outpatient PT arranged with [facility], see AVS, per Pt preferences from choice list provided  The Pt's current course of Tx & post DC goals of care have been shared with Post-acute care service providers  Pt was provided with a paper script to get a rollator at the place & time of his preference; discussed with PT & spouse

## 2023-04-10 NOTE — DISCHARGE INSTRUCTIONS
Craniotomy for Tumor Resection   WHAT YOU NEED TO KNOW:   Your recovery time may depend on how much of the tumor is removed, and where it is removed from in your brain  It may take several weeks for you to recover  DISCHARGE INSTRUCTIONS:   Have someone else call 911 for any of the following: You have any of the following signs of a stroke:      Numbness or drooping on one side of your face     Weakness in an arm or leg    Confusion or difficulty speaking    Dizziness, a severe headache, or vision loss    You have trouble breathing  You have a seizure  Your seizure lasts longer than 5 minutes  You have more than 1 seizure before you are fully awake or aware  You feel lightheaded, short of breath, and have chest pain  You cough up blood  Seek care immediately if:   You have a second seizure that happens within 24 hours of your first      You are injured during a seizure  Blood soaks through your bandage  Your stitches come apart  You have a severe headache and a stiff neck  You are confused  You have changes in your vision  You fall and hit your head  Your arm or leg feels warm, tender, and painful  It may look swollen and red  Contact your healthcare provider if:   You have a fever or chills  You have new or worsening symptoms  Your incision is red, swollen, or draining pus  You have nausea or are vomiting  Your skin is itchy, swollen, or you have a rash  You feel anxious or depressed  You continue to have a headache after you take your medicine  You have questions or concerns about your condition or care  Medicines: You may need any of the following:  Antibiotics  help prevent a bacterial infection  Seizure medicine  helps control or prevent seizures  Steroids  may be given to prevent swelling in your brain  Prescription pain medicine  may be given  Ask your healthcare provider how to take this medicine safely   Some prescription pain medicines contain acetaminophen  Do not take other medicines that contain acetaminophen without talking to your healthcare provider  Too much acetaminophen may cause liver damage  Prescription pain medicine may cause constipation  Ask your healthcare provider how to prevent or treat constipation  Take your medicine as directed  Contact your healthcare provider if you think your medicine is not helping or if you have side effects  Tell your provider if you are allergic to any medicine  Keep a list of the medicines, vitamins, and herbs you take  Include the amounts, and when and why you take them  Bring the list or the pill bottles to follow-up visits  Carry your medicine list with you in case of an emergency  Care for your incision as directed:  Ask your surgeon when your incision can get wet  Carefully wash around the incision with soap and water  Ask your healthcare provider if you need to use a certain type of soap or shampoo  Do not scrub your incision  Dry the area and put on new, clean bandages as directed  Change your bandages when they get wet or dirty  Do not put hair spray, gel, or lotion on your scalp unless your healthcare provider says it is okay  Do not swim or take a bath until your healthcare provider says it is okay  Your healthcare provider may tell you to wear a soft hat to protect the area  Self-care:   Do not smoke  Nicotine and other chemicals in cigarettes and cigars can delay healing  Ask your healthcare provider for information if you currently smoke and need help to quit  E-cigarettes or smokeless tobacco still contain nicotine  Talk to your healthcare provider before you use these products  Do not drink alcohol  Alcohol can prevent healing  It can also make your headache, dizziness, or balance worse  Keep your head elevated when you sleep  This will help decrease swelling and pain   Prop your head on 2 to 3 pillows or blankets to keep it elevated comfortably or sleep in a recliner  Place a cold compress on your eyes as directed  This will help decrease swelling and bruising  Use a washcloth or towel soaked in cool water  Leave it on your skin for 10 to 15 minutes  Repeat this up to 4 times each day  Activity:  Rest as directed  Take short naps throughout the day if you get tired  Do not lift anything heavier than 5 pounds  Do not drive until your healthcare provider says it is okay  Ask your healthcare provider what activities are safe for you to do  Increase your activity gradually as directed  It may take several weeks for you to get stronger and be able to do your usual activities  For more information and support: It may be difficult for you and your family to go through treatment for a brain tumor  It may be helpful to speak with others that have gone through treatment  For more information and support:   [employer]  Phone: 1- 825 - 593  Web Address: Rylan hi  Follow up with your healthcare provider as directed: You may need to return for tests  You may need to have radiation or chemotherapy after you heal from surgery  Radiation and chemotherapy help kill cancer cells and prevent them from spreading  Write down your questions so you remember to ask them during your visits  © Copyright [employer] 2022 Information is for End User's use only and may not be sold, redistributed or otherwise used for commercial purposes  The above information is an  only  It is not intended as medical advice for individual conditions or treatments  Talk to your doctor, nurse or pharmacist before following any medical regimen to see if it is safe and effective for you

## 2023-04-10 NOTE — PROGRESS NOTES
PM&R PROGRESS NOTE:  oJy Medina 69 y o  male MRN: [id number]  Unit/Bed#: -01 Encounter: [id number]        Rehabilitation Diagnosis: Impairment of mobility, safety and Activities of Daily Living (ADLs) due to Brain Dysfunction:  02 1  Non-Traumatic    HPI: Joy Medina is a 69 y o  male with a PMH significant for HTN, prostate cancer and CVA in 2011, who presented to the [facility] on 03/19/23 with one week of progressive left sided weakness  CTA with right frontal lobe cystic lesion with surrounding vasogenic edema consistent with metastatic disease  Local mass effect on the right lateral ventricle without significant midline shift  Right upper lobe mass with partially imaged right hilar adenopathy  MRI confirmed  Neurology and neurosurgery consulted  Patient initiated on Keppra and Decadron with plans for surgical resection within the week  Pulmonology consulted with plans for tissue biopsy after neurosurgery  Hem/Onc consulted and recommended outpatient follow up for definitive treatment plan  A full body scan was obtained on 3/22 which showed no metastatic disease  Pt taken to the OR with Dr Armando Luna on 03/23 and is s/p right frontal craniotomy  Intraop, patient had melton catheter placement that was difficulty  Keppra ordered post-op for one week for seizure ppx  Decadron taper continued for cerebral edema  Aspirin on hold for at least 2 weeks post-op  Urology consulted and recommended leaving in place for approximately seven days with removal at rehab and eventual follow up as an outpatient  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  Patient evaluated by PT/OT and deemed appropriate for post-acute rehabilitation services  He was accepted to [facility] and arrived on 3/30/23  SUBJECTIVE: Patient seen and evaluated  Offers no complaints  He is looking forward to discharge tomorrow  Reviewed medications and confirmed pharmacy   Will reach out to neurosurgeon tomorrow before discharge to confirm if patient may resume Aspirin  ASSESSMENT: Stable, progressing      PLAN:    Rehabilitation  • Functional deficits:  Impaired mobility, self care, left-sided weakness (LE>UE)  • Continue current rehabilitation plan of care to maximize function      • Functional update:   o PT:  Roll L-R: independent   Sit-lying: independent   Lying to sitting on side of bed: independent   Sit-stand: independent   Bed-chair transfer: independent   Ambulation:  Independent, supervision >150 ft and on uneven surfaces   Stairs: supervision  o OT:  Oral Hygiene: independent  Eating: independent  Grooming: independent   Showering/bathing: castanon cu   Tub/shower transfer: supervision  UB dressing: castanon cu   LB dressing:castanon cu   Putting on/taking off footwear: castanon cu  Picking up object: independent   Toileting hygiene: independent  Toilet transfer: independent   o SLP:  Comprehension: understands complex/abstract but requires more time  Expression: expresses complex/absract but requires more time  Social interaction: appropriate without assistance   Problem solving: solves complex with extra timre  Memory: recognizes with extra time      • Estimated Discharge: 4/11/23 with OP PT/OT/ST      Pain  • Tylenol 975 mg Q8H PRN for mild pain  • Oxycodone 2 5-5 mg Q4H PRN for mod-severe pain    DVT prophylaxis  • Lovenox SQ inj 40 mg daily     Bladder plan  • Continent     Bowel plan  • Continent   • Last BM 4/8/23  • Continue Senna daily PRN, Dulcolax rectal suppository daily PRN    Skin care  · Monitor skin daily  · Apply skin nourishing cream  · Reposition Q2H to offload pressure  · Elevate heels off bed  · Monitor scalp incision site       Lung nodule  Assessment & Plan  · Noted on imaging   · Has follow up with hem/onc 4/16 for definitive treatment plan    Cerebral edema (HCC)  Assessment & Plan  · Decadron taper completed    Brain compression ([facility] [address] )  Assessment & Plan  · Plan under brain mass    Hypercholesterolemia  Assessment & Plan  · Continue Lipitor 40 mg daily after dinner    Essential hypertension  Assessment & Plan  · Monitor vitals  · Continue Cozaar 50 mg daily and Norvasc 10 mg daily    * Brain mass  Assessment & Plan  · Presented with one week of left sided weakness   · CTA with right frontal lobe cystic lesion with surrounding vasogenic edema consistent with metastatic ds  Local mass effect on the right alteral ventricle without significant midline shift  Right upper lobe mass with partially imaged right hilar adenopathy  · S/p Right frontal Craniotomy image-guided for tumor resection with Dr Daniel Ceron on 3/23/23  · Keppra - completed   · Decadron taper completed  · Continue to hold Aspirin for at least 2 weeks per neurosurgery   · Will reach out to neurosurgery to see if patient may resume at discharge  · Will need hem/onc follow up with concern from lung primary disease with RUL mass   · Hem/onc celeste 4/18  · MRI 04/07/23: Surgical cavity in the right paramedian posterior frontal lobe is stable in size from prior study with blood products noted  Surrounding vasogenic edema has decreased in the interval  Continue follow up imaging recommended  No additional masses are seen  · Staples removed 04/06  · Pt will follow up with Neurosurgery in May  · Acute chomprehensive interdisciplinary inpatient rehabilitation to include intensive skilled therapies as outlines with oversight and management by a rehabilitation Physician Assistant overseen by rehabilitation physician as well as inpatient rehabilitation nursing, case management and weekly interdisciplinary team meeting    Mcclelland catheter in place-resolved as of 4/5/2023  Assessment & Plan  · Mcclelland with difficult insertion intraop x7 days now   · Mcclelland discontinued 3/31 successfully without complication   Voiding trial successful  · Patient now passing urine without complication   · OP urology follow up   · Resolved        Appreciate IM "consultants medical co-management  Labs, medications, and imaging personally reviewed  ROS:  A ten point review of systems was completed and pertinent positives are listed in subjective section  All other systems reviewed were negative  Review of Systems   Constitutional: Negative  HENT: Negative  Respiratory: Negative  Negative for shortness of breath  Cardiovascular: Negative  Negative for chest pain  Gastrointestinal: Negative  Negative for abdominal pain and constipation  Genitourinary: Negative  Negative for difficulty urinating  Musculoskeletal: Negative  Negative for myalgias  Neurological: Negative  Psychiatric/Behavioral: Negative  OBJECTIVE:   /72 (BP Location: Right arm)   Pulse 86   Temp 98 5 °F (36 9 °C) (Temporal)   Resp 16   Ht 5' 9\" (1 753 m)   Wt 80 9 kg (178 lb 5 6 oz)   SpO2 95%   BMI 26 34 kg/m²     Physical Exam  Vitals reviewed  Constitutional:       General: He is not in acute distress  Appearance: He is not ill-appearing  HENT:      Head: Normocephalic  Comments: Incision site to scalp healing well  Some areas of scabbing   Eyes:      Pupils: Pupils are equal, round, and reactive to light  Cardiovascular:      Rate and Rhythm: Normal rate and regular rhythm  Pulses: Normal pulses  Heart sounds: Normal heart sounds  No murmur heard  Pulmonary:      Effort: Pulmonary effort is normal  No respiratory distress  Breath sounds: Normal breath sounds  Abdominal:      General: Bowel sounds are normal  There is no distension  Palpations: Abdomen is soft  Musculoskeletal:      Right lower leg: No edema  Left lower leg: No edema  Skin:     General: Skin is warm and dry  Neurological:      General: No focal deficit present  Mental Status: He is alert and oriented to person, place, and time     Psychiatric:         Mood and Affect: Mood normal          Behavior: Behavior normal           Lab Results " Component Value Date    WBC 8 29 04/10/2023    HGB 12 4 04/10/2023    HCT 39 6 04/10/2023    MCV 93 04/10/2023     04/10/2023     Lab Results   Component Value Date    SODIUM 138 04/10/2023    K 3 9 04/10/2023     04/10/2023    CO2 26 04/10/2023    BUN 7 04/10/2023    CREATININE 0 71 04/10/2023    GLUC 130 04/10/2023    CALCIUM 8 8 04/10/2023     Lab Results   Component Value Date    INR 1 06 03/24/2023    INR 1 00 03/23/2023    INR 0 96 03/19/2023    PROTIME 14 0 03/24/2023    PROTIME 13 4 03/23/2023    PROTIME 12 8 03/19/2023           Current Facility-Administered Medications:   •  acetaminophen (TYLENOL) tablet 975 mg, 975 mg, Oral, Q8H PRN, REJI Shaffer-C, 975 mg at 04/08/23 0802  •  amLODIPine (NORVASC) tablet 10 mg, 10 mg, Oral, Daily, REJI Shaffer-C, 10 mg at 04/10/23 1467  •  atorvastatin (LIPITOR) tablet 40 mg, 40 mg, Oral, After Dinner, REJI Shaffer-C, 40 mg at 04/09/23 1744  •  bisacodyl (DULCOLAX) rectal suppository 10 mg, 10 mg, Rectal, Daily PRN, Alyssa De MD  •  enoxaparin (LOVENOX) subcutaneous injection 40 mg, 40 mg, Subcutaneous, Daily, REJI Shaffer-C, 40 mg at 04/10/23 0810  •  losartan (COZAAR) tablet 50 mg, 50 mg, Oral, Daily, REJI Shaffer-C, 50 mg at 04/10/23 9418  •  melatonin tablet 3 mg, 3 mg, Oral, HS, Ev Reyes PA-C, 3 mg at 04/09/23 2200  •  ondansetron (ZOFRAN-ODT) dispersible tablet 4 mg, 4 mg, Oral, Q6H PRN, Ev Reyes PA-C  •  oxyCODONE (ROXICODONE) IR tablet 5 mg, 5 mg, Oral, Q4H PRN, Ev Reyes PA-C  •  oxyCODONE (ROXICODONE) split tablet 2 5 mg, 2 5 mg, Oral, Q4H PRN, Ev Reyes PA-C  •  pantoprazole (PROTONIX) EC tablet 40 mg, 40 mg, Oral, Early Morning, Ev Reyes PA-C, 40 mg at 04/10/23 0555  •  senna (SENOKOT) tablet 17 2 mg, 2 tablet, Oral, Daily PRN, Ev Reyes PA-C    Past Medical History:   Diagnosis Date   • Hypertension    • Prostate cancer ([facility] [address] ) 2001   • Rectal bleeding    • Stroke [facility])     2011 Patient Active Problem List    Diagnosis Date Noted   • Lung nodule 03/21/2023   • Brain mass 03/20/2023   • [facility] 03/20/2023   • Cerebral edema (HCC) 03/20/2023   • Metastatic adenocarcinoma ([facility] ) 2023   • Hypercholesterolemia 09/20/2021   • CVA (cerebral vascular accident) ([facility] ) 08/12/2021   • Essential hypertension 09/11/2020   • Annual physical exam 09/11/2020   • Negative depression screening 02/24/2020   • Overweight (BMI 25 0-29 9) 02/24/2020          Lisa Nicholson PA-C    I have spent a total time of 35 minutes on 04/10/23 in caring for this patient including Instructions for management, Patient and family education, Counseling / Coordination of care, Documenting in the medical record, Reviewing / ordering tests, medicine, procedures  , Obtaining or reviewing history   and Communicating with other healthcare professionals   ** Please Note:  voice to text software may have been used in the creation of this document   Although proof errors in transcription or interpretation are a potential of such software**

## 2023-04-10 NOTE — CASE MANAGEMENT
Message left at [facility] requesting return call to this worker or Pt's spouse to schedule outpatient PT

## 2023-04-10 NOTE — PROGRESS NOTES
[facility] DAILY TREATMENT NOTE    04/10/23 1030   Pain Assessment   Pain Assessment Tool 0-10   Pain Score No Pain   Restrictions/Precautions   Precautions Fall Risk;Seizure;Cognitive   Speech/Language/Cognition Assessmetn   Treatment Assessment background noise on for session  pathwords level 2 solved <2:00 dasia  level 4 solved with mod cue in 7:00 and level 5 solved with min cue in 5:00  1 letter omission letter placement completed @ with min-mod cue initially on first 3 trials and independent remainder of activity     SLP Therapy Minutes   SLP Time In 1030   SLP Time Out 1130   SLP Total Time (minutes) 60   SLP Mode of treatment - Individual (minutes) 60   SLP Mode of treatment - Concurrent (minutes) 0   SLP Mode of treatment - Group (minutes) 0   SLP Mode of treatment - Co-treat (minutes) 0   SLP Mode of Treatment - Total time(minutes) 60 minutes   SLP Cumulative Minutes 270

## 2023-04-10 NOTE — PROGRESS NOTES
04/10/23 1135 04/10/23 1240   Pain Assessment   Pain Assessment Tool 0-10  --    Pain Score No Pain  --    Restrictions/Precautions   Precautions Seizure  --    Weight Bearing Restrictions No  --    ROM Restrictions No  --    Eating   Type of Assistance Needed Independent  --    Eating CARE Score 6  --    Eating Assessment   Food To Mouth Yes  --    Able To Cut Yes  --    Positioning Upright;Out of Bed  --    Meal Assessed Breakfast  --    Opens Packages Yes  --    Sit to Stand   Type of Assistance Needed Independent  --    Sit to Stand CARE Score 6  --    Bed-Chair Transfer   Type of Assistance Needed Independent  --    Chair/Bed-to-Chair Transfer CARE Score 6  --    Meal Prep   Meal Prep Level Walker  --    Meal Prep Level of Assistance Modified independent  --    Meal Preparation pt able to navigate kitchen to gather items and make egg salad all while standing  --    Kitchen Mobility   Kitchen-Mobility Level Walker  --    Kitchen Activity Retrieve items;Transport items  --    Kitchen Mobility Comments Mod I with and without RW  --    Additional Activities   Additional Activities Other (Comment)  --    Additional Activities Comments fxl mobility short distances in the room without the RW and in the hallway with the RW all Mod I  --    Other Comments   Assessment Pt participates in 25 minutes concurrent treatmetn focusing on therex and theract to increase strength and activity tolerance with similar goals as another patient  --    Assessment   Treatment Assessment Pt presents seated in chair declining a shower after bathing earlier and agreeable to light meal prep activity making egg salad while standing at the counter  Pt tolerates session without complaints and is scheduled for completion of OT session following lunch  --    Problem List Decreased endurance;Decreased strength;Decreased range of motion  --    Plan   Treatment/Interventions ADL retraining;Functional transfer training; Therapeutic exercise; Endurance training;Patient/family training;Equipment eval/education; Compensatory technique education  --    Progress Progressing toward goals  --    OT Therapy Minutes   OT Time In 1135  --    OT Time Out 1200 1350   OT Total Time (minutes) 25 70   OT Mode of treatment - Concurrent (minutes) 25  --    Therapy Time missed   Time missed?  No  --

## 2023-04-10 NOTE — PROGRESS NOTES
04/10/23 0900   Pain Assessment   Pain Assessment Tool 0-10   Pain Score No Pain   Restrictions/Precautions   Precautions Fall Risk;Seizure   Cognition   Orientation Level Oriented X4   Subjective   Subjective Patient reports he feels better, ready to go home  Roll Left and Right   Type of Assistance Needed Independent   Physical Assistance Level No physical assistance   Roll Left and Right CARE Score 6   Sit to Lying   Type of Assistance Needed Independent   Physical Assistance Level No physical assistance   Sit to Lying CARE Score 6   Lying to Sitting on Side of Bed   Type of Assistance Needed Independent   Physical Assistance Level No physical assistance   Lying to Sitting on Side of Bed CARE Score 6   Sit to Stand   Type of Assistance Needed Independent   Physical Assistance Level No physical assistance   Sit to Stand CARE Score 6   Bed-Chair Transfer   Type of Assistance Needed Independent   Physical Assistance Level No physical assistance   Chair/Bed-to-Chair Transfer CARE Score 6   Car Transfer   Reason if not Attempted Environmental limitations   Car Transfer CARE Score 10   Walk 10 Feet   Type of Assistance Needed Independent   Physical Assistance Level No physical assistance   Walk 10 Feet CARE Score 6   Walk 50 Feet with Two Turns   Type of Assistance Needed Independent   Physical Assistance Level No physical assistance   Walk 50 Feet with Two Turns CARE Score 6   Walk 150 Feet   Type of Assistance Needed Independent   Physical Assistance Level No physical assistance   Walk 150 Feet CARE Score 6   Walking 10 Feet on Uneven Surfaces   Type of Assistance Needed Independent   Physical Assistance Level No physical assistance   Walking 10 Feet on Uneven Surfaces CARE Score 6   Ambulation   Primary Mode of Locomotion Prior to Admission Walk   Distance Walked (feet) 600 ft   Assist Device Other  (None)   Does the patient walk? 2   Yes   Wheel 50 Feet with Two Turns   Reason if not Attempted Activity not "applicable   Wheel 50 Feet with Two Turns CARE Score 9   Wheel 150 Feet   Reason if not Attempted Activity not applicable   Wheel 407 Feet CARE Score 9   Wheelchair mobility   Does the patient use a wheelchair? 0  No   Type of Wheelchair Used 1  Manual   Curb or Single Stair   Type of Assistance Needed Independent   Physical Assistance Level No physical assistance   1 Step (Curb) CARE Score 6   4 Steps   Type of Assistance Needed Independent   Physical Assistance Level No physical assistance   4 Steps CARE Score 6   12 Steps   Type of Assistance Needed Independent   Physical Assistance Level No physical assistance   12 Steps CARE Score 6   Picking Up Object   Type of Assistance Needed Independent   Physical Assistance Level No physical assistance   Comment Stand, no AE, firm floor   Picking Up Object CARE Score 6   Toilet Transfer   Comment Not required during session   Therapeutic Interventions   Flexibility Total body stretching program for HEP with patient reporting ongoing \"rubber band\" feeling after immobility over the weekend   Assessment   Treatment Assessment Patient presents seated in chair, agreeable to PT  Discussed DME to be ordered, patient requests paper script for rollator  Provided  Patient tolerated session well, responded well to self stretch for HEP  In need of ongoing interventions to maximize independence  Planned discharge for tomorrow  Problem List Decreased strength;Decreased range of motion;Decreased endurance;Decreased skin integrity   PT Barriers   Physical Impairment Decreased strength;Decreased range of motion;Decreased endurance; Impaired balance;Decreased mobility; Decreased safety awareness;Decreased coordination   Functional Limitation Walking;Transfers;Standing;Stair negotiation;Ramp negotiation   Plan   Treatment/Interventions Functional transfer training;LE strengthening/ROM; Elevations; Therapeutic exercise; Endurance training;Patient/family training;Equipment " eval/education; Bed mobility;Gait training   Progress Progressing toward goals   PT Therapy Minutes   PT Time In 0900   PT Time Out 1030   PT Total Time (minutes) 90   PT Mode of treatment - Individual (minutes) 0   PT Mode of treatment - Concurrent (minutes) 90   PT Mode of treatment - Group (minutes) 0   PT Mode of treatment - Co-treat (minutes) 0   PT Mode of Treatment - Total time(minutes) 90 minutes   PT Cumulative Minutes 750     HEP provided with demonstration, return demo, and handout  Patient remains OOB in chair, all needs in reach  Alarm in place and activated  Encouraged use of call bell, patient verbalizes understanding

## 2023-04-11 VITALS
RESPIRATION RATE: 15 BRPM | HEART RATE: 85 BPM | DIASTOLIC BLOOD PRESSURE: 67 MMHG | SYSTOLIC BLOOD PRESSURE: 140 MMHG | OXYGEN SATURATION: 94 % | HEIGHT: 69 IN | BODY MASS INDEX: 26.25 KG/M2 | TEMPERATURE: 98.3 F | WEIGHT: 177.25 LBS

## 2023-04-11 LAB
GLUCOSE SERPL-MCNC: 114 MG/DL (ref 65–140)
GLUCOSE SERPL-MCNC: 117 MG/DL (ref 65–140)

## 2023-04-11 RX ORDER — ATORVASTATIN CALCIUM 40 MG/1
40 TABLET, FILM COATED ORAL
Qty: 30 TABLET | Refills: 0 | Status: SHIPPED | OUTPATIENT
Start: 2023-04-11 | End: 2023-05-11

## 2023-04-11 RX ORDER — ACETAMINOPHEN 325 MG/1
975 TABLET ORAL EVERY 8 HOURS PRN
Qty: 30 TABLET | Refills: 0 | Status: SHIPPED | OUTPATIENT
Start: 2023-04-11

## 2023-04-11 RX ADMIN — LOSARTAN POTASSIUM 50 MG: 50 TABLET, FILM COATED ORAL at 08:41

## 2023-04-11 RX ADMIN — AMLODIPINE BESYLATE 10 MG: 10 TABLET ORAL at 08:41

## 2023-04-11 RX ADMIN — ENOXAPARIN SODIUM 40 MG: 40 INJECTION SUBCUTANEOUS at 08:41

## 2023-04-11 RX ADMIN — PANTOPRAZOLE SODIUM 40 MG: 40 TABLET, DELAYED RELEASE ORAL at 06:12

## 2023-04-11 NOTE — TEAM CONFERENCE
Acute RehabilitationTeam Conference Note  Date: 4/11/2023   Time: 10:47 AM       Patient Name:  Ny Dubois       Medical Record Number: [id number]   YOB: 1955  Sex: Male          Room/Bed:  [facility] 219/[facility] 219-01  Payor Info:  Payor: Mark Melton / Plan: Nova Bruins / Product Type: Blue Fee for Service /      Admitting Diagnosis: Brain mass [G93 [id number]]   Admit Date/Time:  3/30/2023  1:31 PM  Admission Comments: No comment available     Primary Diagnosis:  Brain mass  Principal Problem: Brain mass    Patient Active Problem List    Diagnosis Date Noted   • Lung nodule 03/21/2023   • Brain mass 03/20/2023   • Brain compression ([facility] [address] ) 03/20/2023   • Cerebral edema ([facility] [address] ) 03/20/2023   • Metastatic adenocarcinoma ([facility] [address] ) 2023   • Hypercholesterolemia 09/20/2021   • CVA (cerebral vascular accident) ([facility] [address] ) 08/12/2021   • Essential hypertension 09/11/2020   • Annual physical exam 09/11/2020   • Negative depression screening 02/24/2020   • Overweight (BMI 25 0-29 9) 02/24/2020       Physical Therapy:    Weight Bearing Status: Full Weight Bearing  Transfers: Independent  Bed Mobility: Independent  Amulation Distance (ft): 600 feet  Ambulation: Independent  Assistive Device for Ambulation: Roller Walker  Wheelchair Mobility Distance: 200 ft  Wheelchair Mobility: Independent, Supervision  Number of Stairs: 14  Assistive Device for Stairs: Right Hand Rail  Stair Assistance: Independent  Ramp: Independent  Assistive Device for Ramp: None  Discharge Recommendations: Home with:  [address] [facility] with[de-identified] Family Support, Outpatient Physical Therapy    4/4/2023:  Patient seen for PT during ARU stay  Presents following removal of brain mass and residual deficits with decreased ROM/strength, decreased balance and safety, decreased endurance, and pain     Patient Cl S bed mobility, CGA/min A transfers with RW, CGA ambulation up to 91 feet on level and unlevel surfaces without AD and 200 feet with RW    S negotiation of 14 steps with bilateral handrails  Patient would benefit from continued inpatient ARC PT to increase function, safety, and increased independence in prep for safe d/c to home  4/11/2023:  Patient making steady progress with PT, scheduled for discharge today  Presents following removal of brain mass and residual deficits with decreased ROM/strength, decreased balance and safety, decreased endurance, and pain  Patient MI bed mobility, MI transfers without AD, MI ambulation up to 600+ feet on level and unlevel surfaces  MI negotiation of 14 steps with bilateral handrails  Patient would benefit from continued inpatient ARC PT to increase function, safety, and increased independence in prep for safe d/c to home  Occupational Therapy:  Eating: Independent  Grooming: Independent  Bathing: Independent  Bathing: Independent  Upper Body Dressing: Independent  Lower Body Dressing: Independent  Toileting: Independent  Tub/Shower Transfer: Supervision  Toilet Transfer: Independent  Cognition: Within Defined Limits  Orientation: Person, Place, Time, Situation  Discharge Recommendations: Home with:  [address] with[de-identified] Family Support, Outpatient Occupational Therapy       4/3/2023: Pt participates in ADLs, transfers/ mobility and BUE therex focusing on neuro senait yessica LUE  Pt requires assist and supervision due to decreased balance, safety, endurance and generalized strength/ ROM yessica of LUE with head incision  Pt is making gains towards goals and current LOF as listed above  Pt will benefit from continued skilled OT services to increase independence with daily tasks  4/10/23: Pt participates in ADLs, transfers/ mobility and BUE therex focusing on neuro senait yessica LUE  Pt is making gains towards goals and current LOF as listed abover requiring occ reminders due to decreased endurance   Pt will benefit from continued skilled OT services to increase independence with daily tasks prior to discharge and then outpatient following discharge from [facility]  Speech Therapy:  Mode of Communication: Verbal  Cognition: Exceptions to WNL  Cognition: Decreased Attention  Orientation: Person, Place, Time, Situation  Swallowing: Within Defined Limits  Diet Recommendations: Regular Diet, Thin  Discharge Recommendations: Home with:  [address] with[de-identified] Family Support, Outpatient Speech Therapy  4/4 Pt noticing some difficulty with concentration as well as 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  Also PT observed some difficulty with higher level attention tasks during sessions which patient feels is changed from baseline  Completed RBANS assessment this morning, full results to follow  Therapy recs to follow  4/11 cognitive- linguistic assessment complete  CURRENT FIM LEVELS   Comprehension   Comprehension (FIM) 6 - Understands complex/abstract but requires more time   Expression   Expression (FIM) 6 - Expresses complex/abstract but requires:  more time   Social Interaction   Social Interaction (FIM) 7 - Interacts appropriately without assistive device, medication or helper   Problem Solving   Problem solving (FIM) 6 - Solves complex problems BUT requires extra time   Memory   Memory (FIM) 6 - Recognizes with extra time       RBANS Form: A administered 4/4/23      Cognitive Domain/Subtest: Index Score: Percentile Rank: Classification:   IMMEDIATE MEMORY 103 58%ile Average        1  List Learning (27/40)        2  Story Memory (19/24)       VISUOSPATIAL/  CONSTRUCTIONAL 109 73%ile Average        3  Figure Copy (20/20)        4  Line Orientation (16/20)       LANGUAGE 85 16%ile Low Average        5  Picture Naming (10/10)        6  Semantic Fluency (13/40)       ATTENTION 91 27%ile Average        7  Digit Span (10/16)        8  Coding (37/89)       DELAYED MEMORY 112 79%ile High Average        9  List Recall (9/10)        10  List Recognition (20/20)        11  Story Recall (10/12)        12   Figure Recall (16/20)            Sum of Index Scores:  500 Total Score:  100   Percentile: 50%ile   Classification: Average             Nursing Notes:  Appetite: Good  Diet Type: Regular/House                      Diet Patient/Family Education Complete: Yes    Type of Wound (LDA):  (incision top of head stapled RONEL)                    Type of Wound Patient/Family Education: Yes  Bladder: Continent     Bladder Patient/Family Education: Yes  Bowel: Continent     Bowel Patient/Family Education: Yes  Pain Location/Orientation: Location: Head  Pain Score: 0                       Hospital Pain Intervention(s): Declines  Pain Patient/Family Education: Yes  Medication Management/Safety  Injectable: Lovenox  Safe Administration: Yes  Medication Patient/Family Education Complete: Yes    4/3/23  69 yr old male [facility] with [facility]  CTA head showed right frontal lobe lesion consistent with mets  3-23-23 Right frontal craniotomy at [facility]  Stapled scalp incision with dried blood noted RONEL no draiange (possibly remove staples 4/6 Nidia to check with surgeon prior to removal)  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  Aox4, glasses, mild [place], HRR, Lungs clear/diminished on RA  Transfers contact guard with RW  Fall and Seizure precautions  Follow-up MRI scheduled 4-6-23         4/10/23  69 yr old male admitted from [facility] with [facility] mass  CTA head showed right frontal lobe lesion consistent with mets  3-23-23 Right frontal craniotomy at [facility]  Staples removed (4/6/23), incision with dried blood noted  RONEL no drainage  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  Aox4, glasses, mild [place], HRR, Lungs clear/diminished on RA  Transfers Mod I RW  Fall and Seizure precautions           Case Management:     Discharge Planning  Living Arrangements: Lives w/ Spouse/significant other  Support Systems: Spouse/significant other  Assistance Needed: outpatient therapy possible  Type of Current Residence: Private residence  Current Home Care Services: No  Initial assessment & orientation to [facility] with Pt & spouse, who expressed understanding & agreement  Pt & spouse reside in a multi-story home, 1 curb step to enter, flight of stairs inside to bedroom; he expressed that he is highly motivated & expects to be able to ambulate stairs  They do have alternate DC homes they can go to without stairs if needed, but 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  Discussed role of team members & reviewed [address] with Pt & Pt's spouse, who expressed understanding & agreement  SW will continue to monitor & assist as needed with [address]  See UR section for insurance details; primary is [facility], not Medicare  4/4 - Tx team recommendations reviewed with patient & spouse, who expressed understanding & agreement  Target DC date is 4/11 with outpatient therapy; a list of providers was provided to Pt & a referral will be made to [address] based on Pt & spouse preference  SW will continue to monitor & assist as needed with Tx & DC planning  4/11/23 - DC instructions reviewed with Pt & Pt's spouse, who expressed an understanding & agreement  Pt being [address] home today after 4 PM, being transported by family via car, which tx team has determined to be a safe mode of transport  FU appts indicated on DC instructions, to be scheduled by Pt per scheduling preferences  Outpatient PT arranged with [facility], see AVS, per Pt preferences from choice list provided  The Pt's current course of Tx & post DC goals of care have been shared with Post-acute care service providers  Pt was provided with a paper script to get a rollator at the place & time of his preference; discussed with PT & spouse  Is the patient actively participating in therapies?  yes  List any modifications to the treatment plan: na    Barriers Interventions   Lung noduoles, HTN Follow-up oncology, medical management and oversight   Decreased ROM and strength Therapeutic exercise, therapeutic activity   Decreased balance, end ADL, transfer, gait training   Decreased cog ST strategies, ADL/transfer/gait training         Is the patient making expected progress toward goals?  yes  List any update or changes to goals: na    Medical Goals: Patient will be able to manage medical conditions and comorbid conditions with medications and follow up upon completion of rehab program    Weekly Team Goals:   Rehab Team Goals  ADL Team Goal: Patient will be independent with ADLs with least restrictive device upon completion of rehab program  Bowel/Bladder Team Goal: Patient will return to premorbid level for bladder/bowel management upon completion of rehab program  Transfer Team Goal: Patient will be independent with transfers with least restrictive device upon completion of rehab program  Locomotion Team Goal: Patient will be independent with locomotion with least restrictive device upon completion of rehab program  Cognitive Team Goal: Patient will return to premorbid level of cognitive activity upon completion of rehab program     Mod I bed mobility, transfers, and mobility  Mod I self care  Mod I problem solving and memory; I comprehension and expression    Health and wellness: to be able to return to work    Discussion: Plan for return home with wife with outpatient PT, OT, and ST    Anticipated Discharge Date:  April 11, 2023

## 2023-04-11 NOTE — PHYSICAL THERAPY NOTE
PT DISCHARGE SUMMARY    Patient HAS met max benefit from inpatient ARC PT  Patient MI Bed mobility; MI Transfers; MI Gait with no AD on level and unlevel surfaces  Stairs with MI  Patient continues to require cues for arm swing  Will benefit from continued PT services post discharge

## 2023-04-11 NOTE — NURSING NOTE
Reviewed discharge instructions with pt and understood  All belongings taken home with pt  Team discussed safest transport home would be car driven by pt's wife  Discharged home in satisfactory condition

## 2023-04-11 NOTE — PROGRESS NOTES
04/11/23 1140   Pain Assessment   Pain Assessment Tool 0-10   Pain Score No Pain   Restrictions/Precautions   Precautions Seizure   Health Management   Health Management Level of Assistance Modified independent   Health Management pt able to sort medications per instrustions without difficulty to prepare for following directions at home   Exercise Tools   Other Exercise Tool 1 HEP reviewed and issued with 2# wt 1 set 15 completed and 2 sets 15 recommended  Pt able to demonstrate without difficulty and instructioed to gradually imcrease weight  Therex including shoulder chest press, flexion/ extension, abd/ add; elbow flexion/ extension, supination and wrist flexion/ extension  Other Comments   Assessment Pt participates in 15 minutes concurrent treatment focusing on therex to increase strength and activity tolernace with similar goals as another patient   Assessment   Treatment Assessment Pt tolerates brief session including medicaiton management and BUE therex with HEP completion  Pt is scheduled for discharge to home with wife later this day with goals met  Problem List Decreased endurance   Plan   Treatment/Interventions ADL retraining;Functional transfer training; Therapeutic exercise; Endurance training;Patient/family training;Equipment eval/education; Compensatory technique education   Progress Progressing toward goals   OT Therapy Minutes   OT Time In 1140   OT Time Out 1200   OT Total Time (minutes) 20   OT Mode of treatment - Individual (minutes) 5   OT Mode of treatment - Concurrent (minutes) 15   Therapy Time missed   Time missed?  No

## 2023-04-11 NOTE — PROGRESS NOTES
04/11/23 0645   Pain Assessment   Pain Assessment Tool 0-10   Pain Score No Pain   Restrictions/Precautions   Precautions Seizure   Cognition   Overall Cognitive Status WFL   Subjective   Subjective Reports no concerns with anything going home  Roll Left and Right   Type of Assistance Needed Independent   Physical Assistance Level No physical assistance   Roll Left and Right CARE Score 6   Sit to Lying   Type of Assistance Needed Independent   Physical Assistance Level No physical assistance   Sit to Lying CARE Score 6   Lying to Sitting on Side of Bed   Type of Assistance Needed Independent   Physical Assistance Level No physical assistance   Lying to Sitting on Side of Bed CARE Score 6   Sit to Stand   Type of Assistance Needed Independent   Physical Assistance Level No physical assistance   Sit to Stand CARE Score 6   Bed-Chair Transfer   Type of Assistance Needed Independent   Physical Assistance Level No physical assistance   Chair/Bed-to-Chair Transfer CARE Score 6   Car Transfer   Reason if not Attempted Environmental limitations   Car Transfer CARE Score 10   Walk 10 Feet   Type of Assistance Needed Independent   Physical Assistance Level No physical assistance   Walk 10 Feet CARE Score 6   Walk 50 Feet with Two Turns   Type of Assistance Needed Independent   Physical Assistance Level No physical assistance   Walk 50 Feet with Two Turns CARE Score 6   Walk 150 Feet   Type of Assistance Needed Independent   Physical Assistance Level No physical assistance   Comment no AD, 200+ feet   Walk 150 Feet CARE Score 6   Walking 10 Feet on Uneven Surfaces   Type of Assistance Needed Independent   Physical Assistance Level No physical assistance   Comment no AD   Walking 10 Feet on Uneven Surfaces CARE Score 6   Ambulation   Primary Mode of Locomotion Prior to Admission Walk   Distance Walked (feet) 600 ft   Assist Device   (None)   Does the patient walk? 2   Yes   Wheel 50 Feet with Two Turns   Reason if not Attempted Activity not applicable   Wheel 50 Feet with Two Turns CARE Score 9   Wheel 150 Feet   Reason if not Attempted Activity not applicable   Wheel 604 Feet CARE Score 9   Wheelchair mobility   Does the patient use a wheelchair? 0  No   Type of Wheelchair Used 1  Manual   Curb or Single Stair   Style negotiated Single stair   Type of Assistance Needed Independent   Physical Assistance Level No physical assistance   1 Step (Curb) CARE Score 6   4 Steps   Type of Assistance Needed Independent   Physical Assistance Level No physical assistance   4 Steps CARE Score 6   12 Steps   Type of Assistance Needed Independent   Physical Assistance Level No physical assistance   Comment FF with single HR on left   12 Steps CARE Score 6   Picking Up Object   Type of Assistance Needed Independent   Physical Assistance Level No physical assistance   Picking Up Object CARE Score 6   Toilet Transfer   Comment Not required during session, patient is MI in room  Therapeutic Interventions   Strengthening Standing LE TE with t-band   Flexibility Dynamic stretching while walking: high knees, exaggerated arm swings, long steps   Neuromuscular Re-Education 4 square step with cog challenge with recall of sequence as well as simple math tasks   Equipment Use   NuStep 17 mins, L4, 3 laps   Assessment   Treatment Assessment Patient presents seated in chair, NAD  Patient demosntrates independence with transfers and safe/steady ambulation  Overall making steady gains with activity  Demonstrates motivation to return home independently, voices no concerns  Will continue with planned discharge today as well as continuation with OPPT program upon discharge  HEP program in place  Problem List Decreased strength;Decreased range of motion;Decreased endurance   PT Barriers   Physical Impairment Decreased strength;Decreased range of motion;Decreased endurance; Impaired balance;Decreased mobility; Decreased safety awareness;Decreased coordination   Functional Limitation Walking;Transfers;Standing;Stair negotiation;Ramp negotiation   Plan   Treatment/Interventions ADL retraining;Functional transfer training;LE strengthening/ROM; Elevations; Therapeutic exercise; Endurance training;Cognitive reorientation;Patient/family training;Equipment eval/education; Bed mobility;Gait training   Progress Discontinue PT   PT Therapy Minutes   PT Time In 0645   PT Time Out 0815   PT Total Time (minutes) 90   PT Mode of treatment - Individual (minutes) 0   PT Mode of treatment - Concurrent (minutes) 90   PT Mode of treatment - Group (minutes) 0   PT Mode of treatment - Co-treat (minutes) 0   PT Mode of Treatment - Total time(minutes) 90 minutes   PT Cumulative Minutes 840     Standing LE TE:  Green t-band  3x10, B/L LEs  Hip ABd  Hip Extn  Mini Squats  HR/TR  UE stretch with forward bend    Patient remains OOB in chair, all needs in reach  Alarm in place and activated  Encouraged use of call bell, patient verbalizes understanding

## 2023-04-11 NOTE — CASE MANAGEMENT
DC instructions reviewed with Pt & Pt's spouse, who expressed an understanding & agreement  Pt being [address] home today after 4 PM, being transported by family via car, which tx team has determined to be a safe mode of transport  FU appts indicated on DC instructions, to be scheduled by Pt per scheduling preferences  [facility], see AVS, per Pt preferences from choice list provided  The Pt's current course of Tx & post DC goals of care have been shared with Post-acute care service providers  Pt was provided with a paper script to get a rollator at the place & time of his preference; discussed with PT & spouse

## 2023-04-11 NOTE — PROGRESS NOTES
[facility] DISCHARGE NOTE    04/11/23 1300   Pain Assessment   Pain Assessment Tool 0-10   Pain Score No Pain   Restrictions/Precautions   Precautions Seizure;Cognitive   Cognitive Linguisitic Assessments   Repeatable Battery for the Assessment of Neuropsychological Status (RBANS) RBANS completed 4/4/23  The Repeatable Battery for the Assessment of Neuropsychological Status (RBANS) is a brief, individually-administered assessment which measures attention, language, visuospatial/constructional abilities, and immediate & delayed memory  The RBANS is intended for use with adolescents to adults, ages 15 to 80 years  The following results were obtained during the administration of the assessment      Form: A     Cognitive Domain/Subtest: Index Score: Percentile Rank: Classification:   IMMEDIATE MEMORY 103 58%ile Average        1  List Learning (27/40)        2  Story Memory (19/24)       VISUOSPATIAL/  CONSTRUCTIONAL 109 73%ile Average        3  Figure Copy (20/20)        4  Line Orientation (16/20)       LANGUAGE 85 16%ile Low Average        5  Picture Naming (10/10)        6  Semantic Fluency (13/40)       ATTENTION 91 27%ile Average        7  Digit Span (10/16)        8  Coding (37/89)       DELAYED MEMORY 112 79%ile High Average        9  List Recall (9/10)        10  List Recognition (20/20)        11  Story Recall (10/12)        12  Figure Recall (16/20)            Sum of Index Scores:  500   Total Score:  100   Percentile: 50%ile   Classification: Average      Pt continues with some attention difficulty likely affecting comprehension, memory and problem solving inconsistently  Pt aware of this difficulty with overall processing time mildly affected  Pt has been working towards higher level attention tasks and would continue to benefit from continued outpatient speech therapy services with plan to return back to work in his [employer]  Prognosis is good based on patient motivation and awareness of deficits  Comprehension   Comprehension (FIM) 6 - Has only MILD difficulty with complex/abstract info   Expression   Expression (FIM) 7 - Expresses complex/abstract ideas in a reasonable time w/o devices or helper  Social Interaction   Social Interaction (FIM) 7 - Interacts appropriately without assistive device, medication or helper   Problem Solving   Problem solving (FIM) 6 - Solves complex problems BUT requires extra time   Memory   Memory (FIM) 6 - Recognizes with extra time   Memory Skills   Orientation Level Oriented X4   Swallow Information   Current Diet Regular; Thin liquid   Baseline Diet Regular; Thin liquids   Eating   Type of Assistance Needed Independent   Physical Assistance Level No physical assistance   Eating CARE Score 6   SLP Therapy Minutes   SLP Time In 1300   SLP Time Out 1330   SLP Total Time (minutes) 30   SLP Mode of treatment - Individual (minutes) 30   SLP Mode of treatment - Concurrent (minutes) 0   SLP Mode of treatment - Group (minutes) 0   SLP Mode of treatment - Co-treat (minutes) 0   SLP Mode of Treatment - Total time(minutes) 30 minutes   SLP Cumulative Minutes 300

## 2023-04-11 NOTE — PROGRESS NOTES
04/11/23 0859   Pain Assessment   Pain Assessment Tool 0-10   Pain Score No Pain   Restrictions/Precautions   Precautions Seizure   Eating   Type of Assistance Needed Independent   Eating CARE Score 6   Oral Hygiene   Type of Assistance Needed Independent   Oral Hygiene CARE Score 6   Grooming   Able To Initiate Tasks; Acquire Items;Comb/Brush Hair;Wash/Dry Face;Brush/Clean Teeth;Wash/Dry Hands   Findings independent standing   Shower/Bathe Self   Type of Assistance Needed Independent   Shower/Bathe Self CARE Score 6   Bathing   Assessed Bath Style Shower   Anticipated D/C Bath Style Shower   Able to Demetrius Wilmer Yes   Able to [employer] Temperature Yes   Able to Wash/Rinse/Dry (body part) Left Arm;Right Arm;L Upper Leg;R Upper Leg;L Lower Leg/Foot;R Lower Leg/Foot;Chest;Abdomen;Perineal Area; Buttocks   Positioning Seated;Standing   Adaptive Equipment Shower Bars; Shower Seat;Hand [employer] Noted In Endurance   Adaptive Equipment Grab Bars;Seat with Back   Assessed Shower   Findings independent   Upper Body Dressing   Type of Assistance Needed Independent   Upper Body Dressing CARE Score 6   Lower Body Dressing   Type of Assistance Needed Independent   Lower Body Dressing CARE Score 6   Putting On/Taking Off Footwear   Type of Assistance Needed Independent   Putting On/Taking Off Footwear CARE Score 6   Picking Up Object   Type of Assistance Needed Independent   Picking Up Object CARE Score 6   Dressing/Undressing Clothing   Remove UB Clothes [facility] [address]; Undergarment;Socks; Shoes   [facility]; Undergarment;Socks; Shoes   Positioning Supported Sit;Standing   Sit to Stand   Type of Assistance Needed Independent   Sit to Stand CARE Score 6   Bed-Chair Transfer   Type of Assistance Needed Independent   Chair/Bed-to-Chair Transfer CARE Score 6   [facility]   Type of Assistance Needed Independent Toileting Hygiene CARE Score 6   Toileting   Able to Pull Clothing down yes, up yes  Manage Hygiene Bladder; Bowel   Adaptive Equipment Grab Bar   Toilet Transfer   Type of Assistance Needed Independent   Toilet Transfer CARE Score 6   Toilet Transfer   Surface Assessed Standard Commode   Transfer Technique Standard   Adaptive Equipment Grab Bar   Light Housekeeping   Light Housekeeping Level of Assistance Modified independent   Exercise Tools   UE Ergometer Biodex 5 minutes forward and backwards with speed set for 25 target and power set for 20 target   Theraputty green putty B hands while seated for object retrieval of 9 small items   Cognition   Overall Cognitive Status WFL   Orientation Level Oriented X4   Additional Activities   Additional Activities Other (Comment)   Additional Activities Comments fxl mobility in room and hallway Mod I without DME   Other Comments   Assessment Pt participates in 54 minutes concurrent treatment focusing on therex and theract to increase strength and activity tolernace with similar goals as another patient   Assessment   Treatment Assessment Pt present seated in armchair agreeable to OT session including light homemaking, ADLs, transfers/ mobility without DME and toileting  Pt has reached goals of Mod I with discharge planned later this day  Problem List Decreased strength;Decreased endurance   Plan   Treatment/Interventions ADL retraining;Functional transfer training; Therapeutic exercise; Endurance training;Patient/family training;Equipment eval/education; Compensatory technique education   Progress Progressing toward goals   Recommendation   Discharge Summary Pt participates in ADLs, toileting, transfers/ mobility, light homemaking and community reentry  Pt has achieved goals of Mod I and discharge planed today  DME including a 4 wheeled RW ordered and recommend family support and outpot OT during transition to home     OT Therapy Minutes   OT Time In 8275   OT Time Out 1017 OT Total Time (minutes) 78   OT Mode of treatment - Individual (minutes) 24   OT Mode of treatment - Concurrent (minutes) 54   Therapy Time missed   Time missed?  No

## 2023-04-11 NOTE — DISCHARGE SUMMARY
Discharge Summary - PMR   Jasmin Hunt 69 y o  male MRN: [id number]  Unit/Bed#: [facility] 219-01 Encounter: [id number]    Admission Date: 3/30/2023     Discharge Date:  4/11/23    Etiologic/Rehabilitation Diagnosis: Impairment of mobility, safety and Activities of Daily Living (ADLs) due to Brain Dysfunction:  02 1  Non-Traumatic    HPI: Nola Pitts is a 69 y  o  male with a PMH significant for HTN, prostate cancer and CVA in 2011, [facility] with one week of progressive left sided weakness  CTA with right frontal lobe cystic lesion with surrounding vasogenic edema consistent with metastatic disease  Local mass effect on the right lateral ventricle without significant midline shift  Right upper lobe mass with partially imaged right hilar adenopathy  MRI confirmed  Neurology and neurosurgery consulted  Patient initiated on Keppra and Decadron with plans for surgical resection within the week  Pulmonology consulted with plans for tissue biopsy after neurosurgery  Hem/Onc consulted and recommended outpatient follow up for definitive treatment plan  A full body scan was obtained on 3/22 which showed no metastatic disease   Pt taken to the OR with Dr Gonzalo Shaw on 03/23 and is s/p right frontal craniotomy  Intraop, patient had melton catheter placement that was difficulty  Keppra ordered post-op for one week for seizure ppx  Decadron taper continued for cerebral edema  Aspirin on hold for at least 2 weeks post-op  Urology consulted and recommended leaving in place for approximately seven days with removal at rehab and eventual follow up as an outpatient  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  Patient evaluated by PT/OT and deemed appropriate for post-acute rehabilitation services  He was accepted to [facility] and arrived on 3/30/23       Procedures Performed During [facility] Admission: none    Acute Rehabilitation Center Course: Patient participated in a comprehensive interdisciplinary inpatient rehabilitation program which included involvment of MD, therapies (PT, OT, and/or SLP), RN, CM, SW, dietary, and psychology services  He was able to be advanced to a modified independent level of assist and considered safe for discharge home  Please see below for patient's day to day management of medical needs  Lung nodule  Assessment & Plan  · Noted on imaging   · Has follow up with hem/onc 4/18 for definitive treatment plan  · Rad/Onc [facility] tomorrow 4/12    Cerebral edema (HCC)  Assessment & Plan  · Decadron taper completed    Brain compression (HCC)  Assessment & Plan  · Plan under brain mass    Hypercholesterolemia  Assessment & Plan  · Continue Lipitor 40 mg daily after dinner    Essential hypertension  Assessment & Plan  · Continue Cozaar 50 mg daily and Norvasc 10 mg daily    * Brain mass  Assessment & Plan  · Presented with one week of left sided weakness   · CTA with right frontal lobe cystic lesion with surrounding vasogenic edema consistent with metastatic ds  Local mass effect on the right alteral ventricle without significant midline shift  Right upper lobe mass with partially imaged right hilar adenopathy  · S/p Right frontal Craniotomy image-guided for tumor resection with Dr Nikolai Brady on 3/23/23  · Keppra - completed   · Decadron taper completed  · Continue to hold Aspirin for at least 2 weeks per neurosurgery   · Neurosurgeon and PA on do not disturb, unsure if surgery  Will reach out when able and informed patient if I cannot get a hold of them while he is here, I will call to let him know if it is okay to resume Aspirin   · Hem/onc [facility] 4/18  · Rad/Onc 4/12   · MRI 04/07/23: Surgical cavity in the right paramedian posterior frontal lobe is stable in size from prior study with blood products noted  Surrounding vasogenic edema has decreased in the interval  Continue follow up imaging recommended  No additional masses are seen    · Staples removed 04/06  · Pt will follow up with Neurosurgery in May  · Acute chomprehensive interdisciplinary inpatient rehabilitation to include intensive skilled therapies as outlines with oversight and management by a rehabilitation Physician Assistant overseen by rehabilitation physician as well as inpatient rehabilitation nursing, case management and weekly interdisciplinary team meeting    Mcclelland catheter in place-resolved as of 4/5/2023  Assessment & Plan  · Mcclelland with difficult insertion intraop x7 days now   · Mcclelland discontinued 3/31 successfully without complication  Voiding trial successful  · Patient now passing urine without complication   · OP urology follow up   · Resolved      Discharge Physical Examination:  Constitutional:  Not in acute distress  Not ill-appearing  HENT:  Incision site healing well, some areas of scabbing left   CV:  Normal rate/rhythm  Normal pulses  Normal heart sounds  Pulmonary:  Not in respiratory distress  Normal pulmonary effort  Normal breath sounds  Abdominal:  Normal bowel sounds  Non-distended  MSK:  ROM WNL   Strength 5/5 throughout   Neuro:  No focal deficit present  A&Ox4     Significant Findings, Care, Treatment and Services Provided: No significant findings  Pt participated with PT/OT/ST x3hr/day, 5-7x/week  Followed by [facility] and IM providers       Complications: none     Functional Status Upon Admission to [facility]:  Physical Therapy: transfers and ambulation min assist   Occupational Therapy: grooming s/castanon, UB bathing s/castanon, LB bathing min, UB dressing s/castanon, LB dressing mod   Speech Therapy: n/a    Functional Status Upon Discharge from [facility]:     Physical Therapy Occupational Therapy Speech Therapy   Weight Bearing Status: Full Weight Bearing  Transfers: Independent  Bed Mobility: Independent  Amulation Distance (ft): 600 feet  Ambulation: Independent  Assistive Device for Ambulation: Roller Walker  Wheelchair Mobility Distance: 200 ft  Wheelchair Mobility: Independent, Supervision  Number of Stairs: 14  Assistive Device for Stairs: Right Hand Rail  Stair Assistance: Independent  Ramp: Independent  Assistive Device for Ramp: None  Discharge Recommendations: Home with:  [address] with[de-identified] Family Support, Outpatient Physical Therapy   Eating: Independent  Grooming: Independent  Bathing: Independent  Bathing: Independent  Upper Body Dressing: Independent  Lower Body Dressing: Independent  Toileting: Independent  Tub/Shower Transfer: Supervision  Toilet Transfer: Independent  Cognition: Within Defined Limits  Orientation: Person, Place, Time, Situation   Mode of Communication: Verbal  Cognition: Exceptions to WNL  Cognition: Decreased Attention  Orientation: Person, Place, Time, Situation  Swallowing: Within Defined Limits  Diet Recommendations: Regular Diet, Thin  Discharge Recommendations: Home with:  74 [address] with[de-identified] Family Support, Outpatient Speech Therapy     This patient was discussed by the Interdisciplinary Team in weekly case conference today  The care of the patient was extensively discussed with all care providers and an appropriate rehabilitation plan was formulated unique for this patient  Barriers were identified preventing progression of therapy and appropriate interventions were discussed with each discipline  Please see the team note for input from all disciplines regarding barriers, intervention, and discharge planning  Discharge Diagnosis: Impairment of mobility, safety and Activities of Daily Living (ADLs) due to Brain Dysfunction:  02 1  Non-Traumatic    Discharge Medications:   See after visit summary for reconciled discharge medications provided to patient and family  Condition at Discharge: good     Discharge instructions/Information to patient and family:   See after visit summary for information provided to patient and family  Provisions for Follow-Up Care:  See after visit summary for information related to follow-up care and any pertinent home health orders        Future Appointments   Date Time Provider Paul Laura   4/12/2023  2:00 PM  [facility] AN Rad Onc [facility]   4/12/2023  2:30 PM Macho Herzog MD AN Rad Onc AN HOS[facility] CC   4/18/2023 10:30 AM Agnieszka Mcconnell PT [facility] PT [facility]   4/18/2023 11:20 AM Arelis Ambrocio MD Hem Onc [facility] Practice-Onc   5/8/2023  3:00 PM Cee Glover MD [facility]       Disposition: See After Visit Summary for discharge disposition information  Planned Readmission: No    Discharge Statement   I spent more than 30 minutes discharging the patient  This time was spent on the day of discharge  I had direct contact with the patient on the day of discharge  Greater than 50% of the total time was spent examining patient, answering all patient questions, arranging and discussing plan of care with patient as well as directly providing post-discharge instructions  Additional time then spent on discharge activities  Discharge Medications:  See after visit summary for reconciled discharge medications provided to patient and family

## 2023-04-12 ENCOUNTER — RADIATION THERAPY TREATMENT (OUTPATIENT)
Dept: RADIATION ONCOLOGY | Facility: HOSPITAL | Age: 68
End: 2023-04-12
Attending: STUDENT IN AN ORGANIZED HEALTH CARE EDUCATION/TRAINING PROGRAM

## 2023-04-12 DIAGNOSIS — C79.9 METASTATIC ADENOCARCINOMA (HCC): Primary | ICD-10-CM

## 2023-04-13 PROBLEM — C34.91 CARCINOMA OF RIGHT LUNG (HCC): Status: ACTIVE | Noted: 2023-04-13

## 2023-04-13 NOTE — PROGRESS NOTES
04/13/23 8416   Hello, [Guardian’s Name / Patient’s Lucero Brantley, this is [Caller Lucero Brantley from [facility], and our clinical care team wanted to check on you / your child after your recent visit to the hospital  It will only take 3-5 minutes  Is this a good time? Discharge Call Type/ Specific Diagnosis: General Call   General Discharge Phone Call   Were your/your child's discharge instructions clear and understandable? Please tell me in your own words how to care for yourself/your child now that you're home Yes;Patient understood instructions   Have you filled your/your child's new prescriptions yet? Yes   What questions do you have about those medications? No questions   Are you/your child having any unusual symptoms or problems? (Specific to problem- i e , dressing, pain, bruising or swelling, procedure, etc ) No reported symptoms/problems   Is there anything preventing you from keeping that appointment? No;Patient able to keep appointment   Are there any physicians, nurses, or hospital staff you would like us to recognize for doing a very good job? Global Team Acknowledgment   This call resulted in: No interventions needed   Call Complete   Attempted Number of Calls 1   Discharge phone call complete?  Complete

## 2023-04-18 NOTE — H&P (VIEW-ONLY)
Hematology/Oncology Outpatient Follow-up  Kelli Rodriguez 69 y o  male 1955 [id number]    Date:  4/18/2023        Assessment and Plan:  1  Metastatic non-small cell lung cancer ([address] )  I had a lengthy discussion with the patient and his wife regarding the new diagnosis of his metastatic adenocarcinoma of lung primary  We reviewed the recent CT scan of the chest abdomen pelvis which showed 3 8 cm right upper lobe lung mass which is the site of the primary  The patient was educated about the pathology and the molecular evaluation of the resected brain mass which showed adenocarcinoma without oncogenic  mutation  However the PD-L1 status was 100% with high TMB  We discussed the usual recommendation for metastatic non-small cell lung cancer with high PD-L1 of more than 50%  The recommendation is to pursue monotherapy with immunotherapy alone since we are not seeing any obvious hint of visceral organ involvement  The patient was also educated about the potential benefit of adding chemotherapy to immunotherapy immediately or later on if more aggressive approach is considered pending the PET CT scan  The patient seems to be more comfortable with the immunotherapy as monotherapy  He was educated extensively about the immunotherapy  He will be a candidate for pembrolizumab which will be given on every 3-week basis  We did discuss the benefit of getting Port-A-Cath placed through interventional radiology  - Ambulatory referral to Interventional Radiology; Future    2  Metastasis to brain [facility])  Status post resection of the right parasagittal frontal lobe vertex mass  He is also in the process of getting a radiation treatment to the resected tumor bed area x5 sessions  He will then be monitored closely with frequent MRIs  He is currently off of steroids  - Ambulatory referral to Interventional Radiology;  Future          HPI:  This is a 38-year-old male with history of prostate cancer status post prostatectomy in 2001  History of stroke in 2011, hypertension, etc   The patient presented to the hospital on 3/19/2023 with 1 week of progressive left-sided weakness  CTA of the brain showed right frontal lobe cystic lesion with surrounding vasogenic edema consistent with metastatic disease  The patient then had CT scan of the chest abdomen pelvis on 3/20/2023 which showed:  IMPRESSION:     3 8 x 3 5 cm right upper lobe lung mass with associated overlying pleural tag and adjacent to pleural thickening, this may be due to pleural reaction or mild pleural invasion     No contralateral lung nodules or mass     Right hilar lymphadenopathy        Small lower right paratracheal left paratracheal lymph node do not meet the criteria for pathologic enlargement on the bases of size or morphology     There is no CT evidence of for metastatic disease in the abdomen and pelvis     No lytic destructive lesion in the visualized bony skeleton  The study was marked in EPIC for significant notification  Bone scan was negative  MRI of the brain on 3/21/2023 showed:  IMPRESSION:     Unchanged 2 5 cm right parasagittal frontal lobe vertex mass with central necrosis and marked perilesional vasogenic edema unchanged  Favor metastatic disease (given lung mass) over primary high-grade glioma      Unchanged small subacute infarct in right frontal lobe      Unchanged 0 2 cm leftward midline shift      No new acute intracranial abnormality  There are left-sided weakness improved with dexamethasone  The patient then was taken to the OR on 3/23/2023 and had right frontal craniotomy and resection of the right parasagittal frontal lobe mass  The pathology was compatible with metastatic non-small cell cancer  The molecular evaluation through [facility] showed PD-L1 of 100% with high TMB of 10  No oncogenic  mutation was found    The patient recovered nicely from the surgery and is currently being evaluated for cranial radiation at the bedside of the resected brain mass  Oncology History   Metastatic adenocarcinoma ([facility] )   2023 Initial Diagnosis    Metastatic adenocarcinoma ([facility] )     3/23/2023 Biopsy    Brain, right frontal mass (biopsy):  - Metastatic non-small cell carcinoma     Comment:  - Tumor cells stain diffusely for CAM5 2, CKAE1/3, CK7, TTF1 and NapsinA with absent CK20, p63, CK5/6, p40 expression  This immunopanel favors a metastatic lung adenocarcinoma  Carcinoma of right lung ([facility] )   4/13/2023 Initial Diagnosis    Carcinoma of right lung ([facility] )     4/13/2023 -  Cancer Staged    Staging form: Lung, AJCC 8th Edition  - Clinical: Stage JAY (cT3, cN1, cM1b) - Signed by Yosef Cardenas MD on 4/13/2023           Interval history:    ROS: Review of Systems   Constitutional: Negative for chills and fever  HENT: Negative for ear pain and sore throat  Eyes: Negative for pain and visual disturbance  Respiratory: Negative for cough and shortness of breath  Cardiovascular: Negative for chest pain and palpitations  Gastrointestinal: Negative for abdominal pain and vomiting  Genitourinary: Negative for dysuria and hematuria  Musculoskeletal: Negative for arthralgias and back pain  Skin: Negative for color change and rash  Neurological: Negative for seizures and syncope  All other systems reviewed and are negative        Past Medical History:   Diagnosis Date   • Hypertension    • Prostate cancer ([facility] ) 2001   • Rectal bleeding    • Stroke [facility])     2011         Past Surgical History:   Procedure Laterality Date   • COLONOSCOPY     • CRANIOTOMY Right 3/23/2023    Procedure: Right frontal CRANIOTOMY IMAGE-GUIDED FOR TUMOR;  Surgeon: Sierra Reed MD;  Location: [facility];  Service: Neurosurgery   • ME CYSTOURETHROSCOPY N/A 3/23/2023    Procedure: EUA, DEL CASTILLO INSERTION;  Surgeon: Yuly Yang MD;  Location: [facility];  Service: Urology   • PROSTATECTOMY  2001   • TONSILLECTOMY         Social History Socioeconomic History   • Marital status: /Civil Union     Spouse name: Not on file   • Number of children: Not on file   • Years of education: Not on file   • Highest education level: Not on file   Occupational History   • Not on file   Tobacco Use   • Smoking status: Former     Packs/day: 1 00     Years: 15 00     Pack years: 15 00     Types: Cigarettes     Passive exposure: Never   • Smokeless tobacco: Never   • Tobacco comments:     i quit when i was 28  not sure of exact dates   Vaping Use   • Vaping Use: Never used   Substance and Sexual Activity   • Alcohol use: Yes     Alcohol/week: 6 0 standard drinks     Types: 6 Cans of beer per week     Comment: does not drink every day   • Drug use: Not Currently     Types: Marijuana   • Sexual activity: Yes     Partners: Female     Birth control/protection: None   Other Topics Concern   • Not on file   Social History Narrative   • Not on file     Social Determinants of Health     Financial Resource Strain: Not on file   Food Insecurity: No Food Insecurity   • Worried About Running Out of Food in the Last Year: Never true   • Ran Out of Food in the Last Year: Never true   Transportation Needs: No Transportation Needs   • Lack of Transportation (Medical): No   • Lack of Transportation (Non-Medical):  No   Physical Activity: Not on file   Stress: Not on file   Social Connections: Not on file   Intimate Partner Violence: Not on file   Housing Stability: Low Risk    • Unable to Pay for Housing in the Last Year: No   • Number of Places Lived in the Last Year: 1   • Unstable Housing in the Last Year: No       Family History   Problem Relation Age of Onset   • Dementia Mother            • Breast cancer Sister            • Prostate cancer Brother         Received Radiation       No Known Allergies      Current Outpatient Medications:   •  acetaminophen (TYLENOL) 325 mg tablet, Take 3 tablets (975 mg total) by mouth every 8 (eight) hours as needed for "mild pain or headaches, Disp: 30 tablet, Rfl: 0  •  amLODIPine (NORVASC) 10 mg tablet, Take 1 tablet (10 mg total) by mouth daily, Disp: 90 tablet, Rfl: 3  •  atorvastatin (LIPITOR) 40 mg tablet, Take 1 tablet (40 mg total) by mouth daily after dinner, Disp: 30 tablet, Rfl: 0  •  losartan (COZAAR) 50 mg tablet, Take 1 tablet (50 mg total) by mouth daily, Disp: 90 tablet, Rfl: 3      Physical Exam:  /80 (BP Location: Left arm, Patient Position: Sitting, Cuff Size: Adult)   Pulse 85   Temp 98 °F (36 7 °C)   Resp 17   Ht 5' 9\" (1 753 m)   Wt 80 7 kg (177 lb 14 4 oz)   SpO2 95%   BMI 26 27 kg/m²     Physical Exam  Constitutional:       Appearance: He is well-developed  HENT:      Head: Normocephalic and atraumatic  Eyes:      General: No scleral icterus  Right eye: No discharge  Left eye: No discharge  Conjunctiva/sclera: Conjunctivae normal       Pupils: Pupils are equal, round, and reactive to light  Neck:      Thyroid: No thyromegaly  Trachea: No tracheal deviation  Cardiovascular:      Rate and Rhythm: Normal rate and regular rhythm  Heart sounds: Normal heart sounds  No murmur heard  No friction rub  Pulmonary:      Effort: Pulmonary effort is normal  No respiratory distress  Breath sounds: Normal breath sounds  No wheezing or rales  Chest:      Chest wall: No tenderness  Abdominal:      General: There is no distension  Palpations: Abdomen is soft  There is no hepatomegaly or splenomegaly  Tenderness: There is no abdominal tenderness  There is no guarding or rebound  Musculoskeletal:         General: No tenderness or deformity  Normal range of motion  Cervical back: Normal range of motion and neck supple  Lymphadenopathy:      Cervical: No cervical adenopathy  Skin:     General: Skin is warm and dry  Coloration: Skin is not pale  Findings: No erythema or rash     Neurological:      Mental Status: He is alert and oriented " to person, place, and time  Cranial Nerves: No cranial nerve deficit  Coordination: Coordination normal       Deep Tendon Reflexes: Reflexes are normal and symmetric  Psychiatric:         Behavior: Behavior normal          Thought Content: Thought content normal          Judgment: Judgment normal            Labs:  Lab Results   Component Value Date    WBC 8 29 04/10/2023    HGB 12 4 04/10/2023    HCT 39 6 04/10/2023    MCV 93 04/10/2023     04/10/2023     Lab Results   Component Value Date     05/10/2018    K 3 9 04/10/2023     04/10/2023    CO2 26 04/10/2023    ANIONGAP 11 6 05/10/2018    BUN 7 04/10/2023    CREATININE 0 71 04/10/2023    GLUF 130 (H) 04/10/2023    CALCIUM 8 8 04/10/2023    AST 22 03/31/2023    ALT 21 03/31/2023    ALKPHOS 52 03/31/2023    PROT 7 2 05/10/2018    BILITOT 0 8 05/10/2018    EGFR 95 04/10/2023     No results found for: TSH    Patient voiced understanding and agreement in the above discussion  Aware to contact our office with questions/symptoms in the interim

## 2023-04-19 ENCOUNTER — APPOINTMENT (OUTPATIENT)
Dept: RADIATION ONCOLOGY | Facility: HOSPITAL | Age: 68
End: 2023-04-19
Attending: STUDENT IN AN ORGANIZED HEALTH CARE EDUCATION/TRAINING PROGRAM

## 2023-04-19 ENCOUNTER — LAB REQUISITION (OUTPATIENT)
Dept: LAB | Facility: HOSPITAL | Age: 68
End: 2023-04-19

## 2023-04-19 DIAGNOSIS — C34.90 MALIGNANT NEOPLASM OF UNSPECIFIED PART OF UNSPECIFIED BRONCHUS OR LUNG (HCC): ICD-10-CM

## 2023-04-19 LAB — SCAN RESULT: NORMAL

## 2023-04-21 ENCOUNTER — APPOINTMENT (OUTPATIENT)
Dept: RADIATION ONCOLOGY | Facility: HOSPITAL | Age: 68
End: 2023-04-21

## 2023-04-21 ENCOUNTER — APPOINTMENT (OUTPATIENT)
Dept: RADIATION ONCOLOGY | Facility: HOSPITAL | Age: 68
End: 2023-04-21
Attending: STUDENT IN AN ORGANIZED HEALTH CARE EDUCATION/TRAINING PROGRAM

## 2023-04-24 ENCOUNTER — APPOINTMENT (OUTPATIENT)
Dept: RADIATION ONCOLOGY | Facility: HOSPITAL | Age: 68
End: 2023-04-24
Attending: STUDENT IN AN ORGANIZED HEALTH CARE EDUCATION/TRAINING PROGRAM

## 2023-04-24 ENCOUNTER — DOCUMENTATION (OUTPATIENT)
Dept: HEMATOLOGY ONCOLOGY | Facility: CLINIC | Age: 68
End: 2023-04-24

## 2023-04-24 NOTE — PROGRESS NOTES
THORACIC ONCOLOGY MULTIDISCIPLINARY CASE REVIEW    DATE:  2023    PRESENTING DOCTOR:  Dr Jae Chacko    DIAGNOSIS:  NSCLC~adenocarcinoma  STAGING:  Stage IV    Darylene Broad is a 69 y o  male who was presented at the Thoracic Oncology Multidisciplinary Tumor Conference today  He presented with left hemiparesis, mild headache, chronic cough and exertional dyspnea   On admission he was found to have mass in the brain on the right side with edema and also mass in right upper lung 3 8 x 3 5 cm with adjacent pleural thickening and right hilar lymphadenopathy and has small lower right paratracheal lymph node   He underwent right frontal craniotomy on 3/23/23 with pathology positive for metastatic non-small cell carcinoma favoring a metastatic lung adenocarcinoma  He was consulted by Radiation Oncology in consideration of post-operative SRS/SRT and received his 1st of 5 fractions on 23  PHYSICIAN RECOMMENDED PLAN:  Obtain PET/CT and PFT's  Refer to Thoracic Surgery to discuss options for tissue sampling  Imaging reviewed:   3/20/23 CT chest, abdomen and pelvis-3 8 x 3 5 cm right upper lobe lung mass with associated overlying pleural tag and adjacent to pleural thickening      Right hilar lymphadenopathy  No CT evidence of for metastatic disease in the abdomen and pelvis  No lytic destructive lesion in the visualized bony skeleton    3/19/23 brain MRI    Pathology reviewed:  No    PFT's reviewed:  No    Future imagin23 PET/CT    Referrals:  Thoracic Surgery     Procedures:  TBD    Team agreed to plan  NCCN guidelines were readily available for review at this discussion    The final treatment plan will be left to the discretion of the patient and the treating physician  DISCLAIMERS:  TO THE TREATING PHYSICIAN:  This conference is a meeting of clinicians from various specialty areas who evaluate and discuss patients for whom a multidisciplinary treatment approach is being considered  Please note that the above opinion was a consensus of the conference attendees and is intended only to assist in quality care of the discussed patient  The responsibility for follow up on the input given during the conference, along with any final decisions regarding plan of care, is that of the patient and the patient's provider  Accordingly, appointments have only been recommended based on this information and have NOT been scheduled unless otherwise noted  TO THE PATIENT:  This summary is a brief record of major aspects of your cancer treatment  You may choose to share a copy with any of your doctors or nurses  However, this is not a detailed or comprehensive record of your care

## 2023-04-26 ENCOUNTER — APPOINTMENT (OUTPATIENT)
Dept: RADIATION ONCOLOGY | Facility: HOSPITAL | Age: 68
End: 2023-04-26

## 2023-04-26 ENCOUNTER — TELEPHONE (OUTPATIENT)
Dept: SURGICAL ONCOLOGY | Facility: CLINIC | Age: 68
End: 2023-04-26

## 2023-04-26 ENCOUNTER — APPOINTMENT (OUTPATIENT)
Dept: RADIATION ONCOLOGY | Facility: HOSPITAL | Age: 68
End: 2023-04-26
Attending: STUDENT IN AN ORGANIZED HEALTH CARE EDUCATION/TRAINING PROGRAM

## 2023-04-26 DIAGNOSIS — C34.91 CARCINOMA OF RIGHT LUNG (HCC): ICD-10-CM

## 2023-04-26 DIAGNOSIS — C79.31 METASTASIS TO BRAIN (HCC): Primary | ICD-10-CM

## 2023-04-26 DIAGNOSIS — C79.9 METASTATIC ADENOCARCINOMA (HCC): Primary | ICD-10-CM

## 2023-04-26 RX ORDER — SODIUM CHLORIDE 9 MG/ML
20 INJECTION, SOLUTION INTRAVENOUS ONCE
Status: CANCELLED | OUTPATIENT
Start: 2023-05-03

## 2023-04-26 NOTE — TELEPHONE ENCOUNTER
Responded to e mail from [employer] and [employer] being OON  I responded to her e mail with:     I called [place] and spoke with Pardeep Portillo Ref# [id number]  She came up with the same findings as you where [facility] is OON  The patient’s Home Plan is in [place]  She stated that That the [facility] Local [phone] should be contacted and provided with the NPI’s for the Campuses involved and providers  Once the Local [facility] updates that information [facility] will then acknowledge them and recognize them as IN Network  Any claims they received by [employer] [place] would be forwarded to the [facility] [employer]

## 2023-04-27 ENCOUNTER — HOSPITAL ENCOUNTER (OUTPATIENT)
Dept: INTERVENTIONAL RADIOLOGY/VASCULAR | Facility: HOSPITAL | Age: 68
Discharge: HOME | End: 2023-04-27
Attending: INTERNAL MEDICINE | Admitting: RADIOLOGY

## 2023-04-27 VITALS
RESPIRATION RATE: 16 BRPM | DIASTOLIC BLOOD PRESSURE: 70 MMHG | WEIGHT: 177 LBS | HEIGHT: 69 IN | BODY MASS INDEX: 26.22 KG/M2 | SYSTOLIC BLOOD PRESSURE: 126 MMHG | TEMPERATURE: 97.9 F | HEART RATE: 75 BPM | OXYGEN SATURATION: 97 %

## 2023-04-27 DIAGNOSIS — C34.90 METASTATIC NON-SMALL CELL LUNG CANCER (HCC): ICD-10-CM

## 2023-04-27 RX ORDER — ACETAMINOPHEN 325 MG/1
650 TABLET ORAL ONCE
Status: DISCONTINUED | OUTPATIENT
Start: 2023-04-27 | End: 2023-05-01 | Stop reason: HOSPADM

## 2023-04-27 RX ORDER — SODIUM CHLORIDE 9 MG/ML
30 INJECTION, SOLUTION INTRAVENOUS CONTINUOUS
Status: DISCONTINUED | OUTPATIENT
Start: 2023-04-27 | End: 2023-05-01 | Stop reason: HOSPADM

## 2023-04-27 RX ORDER — FENTANYL CITRATE 50 UG/ML
INJECTION, SOLUTION INTRAMUSCULAR; INTRAVENOUS AS NEEDED
Status: COMPLETED | OUTPATIENT
Start: 2023-04-27 | End: 2023-04-27

## 2023-04-27 RX ORDER — LIDOCAINE HYDROCHLORIDE AND EPINEPHRINE 10; 10 MG/ML; UG/ML
INJECTION, SOLUTION INFILTRATION; PERINEURAL AS NEEDED
Status: COMPLETED | OUTPATIENT
Start: 2023-04-27 | End: 2023-04-27

## 2023-04-27 RX ORDER — CEFAZOLIN SODIUM 1 G/50ML
1000 SOLUTION INTRAVENOUS ONCE
Status: DISCONTINUED | OUTPATIENT
Start: 2023-04-27 | End: 2023-05-01 | Stop reason: HOSPADM

## 2023-04-27 RX ORDER — CEFAZOLIN SODIUM 1 G/50ML
1000 SOLUTION INTRAVENOUS ONCE
Status: CANCELLED | OUTPATIENT
Start: 2023-04-27 | End: 2023-04-27

## 2023-04-27 RX ORDER — MIDAZOLAM HYDROCHLORIDE 2 MG/2ML
INJECTION, SOLUTION INTRAMUSCULAR; INTRAVENOUS AS NEEDED
Status: COMPLETED | OUTPATIENT
Start: 2023-04-27 | End: 2023-04-27

## 2023-04-27 RX ORDER — SODIUM CHLORIDE 9 MG/ML
30 INJECTION, SOLUTION INTRAVENOUS CONTINUOUS
Status: CANCELLED | OUTPATIENT
Start: 2023-04-27

## 2023-04-27 RX ORDER — ACETAMINOPHEN 325 MG/1
TABLET ORAL
Status: COMPLETED
Start: 2023-04-27 | End: 2023-04-27

## 2023-04-27 RX ADMIN — FENTANYL CITRATE 50 MCG: 50 INJECTION, SOLUTION INTRAMUSCULAR; INTRAVENOUS at 11:01

## 2023-04-27 RX ADMIN — MIDAZOLAM HYDROCHLORIDE 1 MG: 1 INJECTION, SOLUTION INTRAMUSCULAR; INTRAVENOUS at 10:41

## 2023-04-27 RX ADMIN — MIDAZOLAM HYDROCHLORIDE 1 MG: 1 INJECTION, SOLUTION INTRAMUSCULAR; INTRAVENOUS at 10:35

## 2023-04-27 RX ADMIN — FENTANYL CITRATE 50 MCG: 50 INJECTION, SOLUTION INTRAMUSCULAR; INTRAVENOUS at 10:45

## 2023-04-27 RX ADMIN — FENTANYL CITRATE 50 MCG: 50 INJECTION, SOLUTION INTRAMUSCULAR; INTRAVENOUS at 10:41

## 2023-04-27 RX ADMIN — ACETAMINOPHEN 650 MG: 325 TABLET ORAL at 13:08

## 2023-04-27 RX ADMIN — LIDOCAINE HYDROCHLORIDE AND EPINEPHRINE 20 ML: 10; 10 INJECTION, SOLUTION INFILTRATION; PERINEURAL at 11:02

## 2023-04-27 RX ADMIN — FENTANYL CITRATE 50 MCG: 50 INJECTION, SOLUTION INTRAMUSCULAR; INTRAVENOUS at 10:35

## 2023-04-27 RX ADMIN — MIDAZOLAM HYDROCHLORIDE 1 MG: 1 INJECTION, SOLUTION INTRAMUSCULAR; INTRAVENOUS at 11:01

## 2023-04-27 RX ADMIN — SODIUM CHLORIDE 30 ML/HR: 0.9 INJECTION, SOLUTION INTRAVENOUS at 10:10

## 2023-04-27 RX ADMIN — MIDAZOLAM HYDROCHLORIDE 1 MG: 1 INJECTION, SOLUTION INTRAMUSCULAR; INTRAVENOUS at 10:45

## 2023-04-27 NOTE — INTERVAL H&P NOTE
Update: (This section must be completed if the H&P was completed greater than 24 hrs to procedure or admission)    H&P reviewed  After examining the patient, I find no changed to the H&P since it had been written  Lung ca  Plans for chemo  Port indicated    Risks alternatives discussed    Mp2 asa3    Patient re-evaluated   Accept as history and physical     Matt Issa MD/April 27, 2023/10:38 AM

## 2023-04-27 NOTE — DISCHARGE INSTRUCTIONS
[address]  Interventional Radiology  [phone]           Implanted Venous Access Port     WHAT YOU NEED TO KNOW:   An implanted venous access port is a device used to give treatments and take blood  It may also be called a central venous access device (CVAD)  The port is a small container that is placed under your skin, usually in your upper chest  The port is attached to a catheter that enters a large vein  DISCHARGE INSTRUCTIONS:   Resume your normal diet  Small sips of flat soda will help with mild nausea  Prevent an infection:   Wash your hands often  Use soap and water  Clean your hands before and after you care for your port  Remind everyone who cares for your port to wash their hands  Check your skin for infection every day  Look for redness, swelling, or fluid oozing from the port site  Care for your port:   1  You may shower beginning 48 hours after procedure  2  Change dressing if it becomes wet  3  Remove dressing after 24 hours  Leave glue in place  4  It is normal for some bruising to occur  5  Use Tylenol for pain  6  Limit use of arm on the side that your port was placed  Lift nothing heavier than 5 pounds for 1 week, and then gradually increase activity as tolerated  7  DO NOT apply ointment, lotion or cream to port site until incision is healed  Allow glue to fall off  DO NOT attempt to peel glue from skin even it it begins to flake  8, After the port incision is healed you may swim, bathe  Notify the Interventional Radiologist if you have any of the followin  Fever above 101 F    2  Increased redness or swelling after 1st day  3  Increased pain after 1st day  4  Any sign of infection (drainage from port site, skin separation, hot to touch)  5  Persistent nausea or vomiting      Contact Interventional Radiology at [phone] [place] PATIENTS: Contact Interventional Radiology at [phone]) Felipe Grajeda PATIENTS: Contact Interventional Radiology at [phone])

## 2023-04-27 NOTE — BRIEF OP NOTE (RAD/CATH)
INTERVENTIONAL RADIOLOGY PROCEDURE NOTE    Date: 4/27/2023    Procedure:   Procedure Summary     Date: 04/27/23 Room / Location: [facility] Interventional Radiology    Anesthesia Start:  Anesthesia Stop:     Procedure: IR PORT PLACEMENT Diagnosis:       Metastatic non-small cell lung cancer ([facility] [address] )      (Metastatic non-small cell lung cancer ([facility] )    Scheduled Providers:  Responsible Provider:     Anesthesia Type: Not recorded ASA Status: Not recorded          Preoperative diagnosis:   1  Metastatic non-small cell lung cancer (HCC)         Postoperative diagnosis: Same  Surgeon: Saqib Dubon MD     Assistant: None  No qualified resident was available  Blood loss: 0    Specimens: 0     Findings: R port placement  ancef    Complications: None immediate      Anesthesia: conscious sedation

## 2023-04-28 ENCOUNTER — APPOINTMENT (OUTPATIENT)
Dept: RADIATION ONCOLOGY | Facility: HOSPITAL | Age: 68
End: 2023-04-28

## 2023-04-28 ENCOUNTER — APPOINTMENT (OUTPATIENT)
Dept: RADIATION ONCOLOGY | Facility: HOSPITAL | Age: 68
End: 2023-04-28
Attending: STUDENT IN AN ORGANIZED HEALTH CARE EDUCATION/TRAINING PROGRAM

## 2023-04-28 ENCOUNTER — HOSPITAL ENCOUNTER (OUTPATIENT)
Dept: RADIOLOGY | Age: 68
Discharge: HOME | End: 2023-04-28

## 2023-04-28 DIAGNOSIS — C79.9 METASTATIC ADENOCARCINOMA (HCC): ICD-10-CM

## 2023-04-28 LAB — GLUCOSE SERPL-MCNC: 94 MG/DL (ref 65–140)

## 2023-05-01 ENCOUNTER — PATIENT OUTREACH (OUTPATIENT)
Dept: HEMATOLOGY ONCOLOGY | Facility: CLINIC | Age: 68
End: 2023-05-01

## 2023-05-01 ENCOUNTER — HOSPITAL ENCOUNTER (OUTPATIENT)
Dept: INFUSION CENTER | Facility: HOSPITAL | Age: 68
Discharge: HOME | End: 2023-05-01

## 2023-05-01 VITALS
TEMPERATURE: 97.5 F | HEART RATE: 94 BPM | DIASTOLIC BLOOD PRESSURE: 77 MMHG | SYSTOLIC BLOOD PRESSURE: 142 MMHG | OXYGEN SATURATION: 94 % | RESPIRATION RATE: 20 BRPM

## 2023-05-01 DIAGNOSIS — C34.91 CARCINOMA OF LUNG, RIGHT (HCC): Primary | ICD-10-CM

## 2023-05-01 DIAGNOSIS — C79.9 METASTATIC ADENOCARCINOMA (HCC): Primary | ICD-10-CM

## 2023-05-01 DIAGNOSIS — C34.91 CARCINOMA OF RIGHT LUNG (HCC): ICD-10-CM

## 2023-05-01 DIAGNOSIS — Z45.2 ENCOUNTER FOR CENTRAL LINE CARE: ICD-10-CM

## 2023-05-01 DIAGNOSIS — C34.91 CARCINOMA OF RIGHT LUNG (HCC): Primary | ICD-10-CM

## 2023-05-01 LAB
ALBUMIN SERPL BCP-MCNC: 4 G/DL (ref 3.5–5)
ALP SERPL-CCNC: 81 U/L (ref 34–104)
ALT SERPL W P-5'-P-CCNC: 38 U/L (ref 7–52)
ANION GAP SERPL CALCULATED.3IONS-SCNC: 8 MMOL/L (ref 4–13)
AST SERPL W P-5'-P-CCNC: 36 U/L (ref 13–39)
BASOPHILS # BLD AUTO: 0.06 THOUSANDS/ΜL (ref 0–0.1)
BASOPHILS NFR BLD AUTO: 1 % (ref 0–1)
BILIRUB SERPL-MCNC: 0.77 MG/DL (ref 0.2–1)
BUN SERPL-MCNC: 13 MG/DL (ref 5–25)
CALCIUM SERPL-MCNC: 8.7 MG/DL (ref 8.4–10.2)
CHLORIDE SERPL-SCNC: 105 MMOL/L (ref 96–108)
CO2 SERPL-SCNC: 27 MMOL/L (ref 21–32)
CREAT SERPL-MCNC: 0.71 MG/DL (ref 0.6–1.3)
EOSINOPHIL # BLD AUTO: 0.07 THOUSAND/ΜL (ref 0–0.61)
EOSINOPHIL NFR BLD AUTO: 1 % (ref 0–6)
ERYTHROCYTE [DISTWIDTH] IN BLOOD BY AUTOMATED COUNT: 13 % (ref 11.6–15.1)
GFR SERPL CREATININE-BSD FRML MDRD: 95 ML/MIN/1.73SQ M
GLUCOSE SERPL-MCNC: 160 MG/DL (ref 65–140)
HCT VFR BLD AUTO: 37.7 % (ref 36.5–49.3)
HGB BLD-MCNC: 12.1 G/DL (ref 12–17)
IMM GRANULOCYTES # BLD AUTO: 0.12 THOUSAND/UL (ref 0–0.2)
IMM GRANULOCYTES NFR BLD AUTO: 1 % (ref 0–2)
LYMPHOCYTES # BLD AUTO: 1.39 THOUSANDS/ΜL (ref 0.6–4.47)
LYMPHOCYTES NFR BLD AUTO: 14 % (ref 14–44)
MCH RBC QN AUTO: 29.1 PG (ref 26.8–34.3)
MCHC RBC AUTO-ENTMCNC: 32.1 G/DL (ref 31.4–37.4)
MCV RBC AUTO: 91 FL (ref 82–98)
MONOCYTES # BLD AUTO: 0.73 THOUSAND/ΜL (ref 0.17–1.22)
MONOCYTES NFR BLD AUTO: 7 % (ref 4–12)
NEUTROPHILS # BLD AUTO: 7.52 THOUSANDS/ΜL (ref 1.85–7.62)
NEUTS SEG NFR BLD AUTO: 76 % (ref 43–75)
NRBC BLD AUTO-RTO: 0 /100 WBCS
PLATELET # BLD AUTO: 289 THOUSANDS/UL (ref 149–390)
PMV BLD AUTO: 9.5 FL (ref 8.9–12.7)
POTASSIUM SERPL-SCNC: 3.6 MMOL/L (ref 3.5–5.3)
PROT SERPL-MCNC: 6.9 G/DL (ref 6.4–8.4)
RBC # BLD AUTO: 4.16 MILLION/UL (ref 3.88–5.62)
SODIUM SERPL-SCNC: 140 MMOL/L (ref 135–147)
T3FREE SERPL-MCNC: 2.54 PG/ML (ref 2.3–4.2)
TSH SERPL DL<=0.05 MIU/L-ACNC: 2.59 UIU/ML (ref 0.45–4.5)
WBC # BLD AUTO: 9.89 THOUSAND/UL (ref 4.31–10.16)

## 2023-05-01 NOTE — PROGRESS NOTES
"Daily Note     Today's date: 2023  Patient name: Kary Andres  : 1955  MRN: [id number]  Referring provider: Natalio Ortiz PA-C  Dx:   Encounter Diagnosis     ICD-10-CM    1  Brain mass  G93 89                      Subjective: I go for my infusion tmrw  Done with my radiation rx  Possible surgery on the right lung upcoming to remove nodule  Overall fatigue is not too bad  I do try to walk every day ~ 1/2 hour  Taking it slow and do find walking up hills challenging        Objective: See treatment diary below      Assessment: Tolerated treatment well denied any increase sx's  Demon L>R LE weakness  Difficulty with ground clearance LLE with side step to right  Pt issued HEP/encourage carryover as limited with attendance of reg PT sessions 2* transportation and co-pay expense  Patient would benefit from continued PT      Plan: Continue per plan of care  Re-eval Date:     Date       Visit Count 1 2      FOTO        Pain In        Pain Out             Precautions:lung/brain cancer, currently receiving treatments  Manuals                                        Neuro Re-Ed         Patient education about POC and diagnoses  Performed 15 min                                Trial exercises next session, create HEP        SLS  3x30\" ea      Step up with holds  8\"  15x 2 ea  1 UE      Ther Ex        Upright bike   L3 10 min  Focus cardiovascular endurance  Cues pacing        Standing SLR   Foam  2x10 with 3\"      Resisted sidestepping   Green  6 laps 10 feet      Squats with functional reach  10x   Cues                                        Ther Activity                        Gait Training                        Modalities                             Access Code: [email or web address]  URL: [email or web address]  Date: 2023  Prepared by: Robert Barkley    Exercises  - Standing Hip Flexion AROM  - 1 x daily - 7 x weekly - 3 sets - 10 reps  - Standing Hip Abduction  - 1 x " daily - 7 x weekly - 3 sets - 10 reps  - Standing Hip Extension with Chair  - 1 x daily - 7 x weekly - 3 sets - 10 reps  - Side Stepping with Resistance at Thighs  - 1 x daily - 7 x weekly - 3 sets - 10 reps  - Standing Knee Flexion AROM  - 1 x daily - 7 x weekly - 3 sets - 30 sec hold  - Step Up  - 1 x daily - 7 x weekly - 3 sets - 10 reps

## 2023-05-01 NOTE — PROGRESS NOTES
Initial outreach  Called and spoke to patient  Introduced myself and explained the role of a Nurse Navigator  Reviewed upcoming appointments including date, time and location  Assessed for barriers of care  Patient denies pain at this time  He stated he is starting immunotherapy on 5/3/23  He is scheduled for PFT's on 5/8/23 following his post-op visit with Dr Reyes Morocho  He will be seeing Dr Yovani Bellamy in consult on 5/10/23  Patient questioned if Dr Yovani Bellamy is in network for his insurance as he recently received a bill saying the [employer] was out of network  Patient stated his primary insurance is [place] blue cross/blue shield  I told the patient I would reach out to Dr Priyanka Dunn team to confirm his insurance  I provided my direct contact information for questions or concerns  I informed the patient either myself of Oncology Care Coordinator, Noni Johnston would be reaching out periodically  He was appreciative

## 2023-05-01 NOTE — PROGRESS NOTES
Central labs drawn per orders  Port flushed freely  Good blood return noted  Port deaccessed without issue  Patient tolerated well  AVS given to patient  Patient ambulated off unit without incident  All personal belongings taken with patient  Progress Note  [facility]  Admit Date: 8/2/2019   LOS: 0 days   SUBJECTIVE:   Follow-up For: Acute PE     Patient seen and examined this AM. Patient currently on heparin drip tolerating therapy well. Patient did endorse some chest pain this morning , however it resolved with sublingual nitroglycerin. Patient current on cardiac diet. 975 UOP. Last BM 8/2.     Review of Systems   Constitutional: Negative for chills and fever.   HENT: Negative for sore throat.    Eyes: Negative for blurred vision and double vision.   Respiratory: Negative for cough and shortness of breath.    Cardiovascular: Negative for chest pain.   Gastrointestinal: Negative for abdominal pain, constipation, diarrhea, heartburn, nausea and vomiting.   Genitourinary: Negative for dysuria and urgency.   Musculoskeletal: Negative for back pain and falls.   Skin: Negative for itching and rash.   Neurological: Negative for headaches.   Endo/Heme/Allergies: Does not bruise/bleed easily.   Psychiatric/Behavioral: The patient is not nervous/anxious.         OBJECTIVE:   Vital Signs (Most Recent)  Temp: 97.9 °F (36.6 °C) (08/03/19 0738)  Pulse: 84 (08/03/19 0749)  Resp: 20 (08/03/19 0738)  BP: (!) 107/53 (08/03/19 0738)  SpO2: 98 % (08/03/19 0719)    I & O (Last 24H):    Intake/Output Summary (Last 24 hours) at 8/3/2019 0840  Last data filed at 8/3/2019 0604  Gross per 24 hour   Intake 1580.85 ml   Output 975 ml   Net 605.85 ml     Wt Readings from Last 3 Encounters:   08/03/19 (!) 161 kg (354 lb 15.1 oz)   08/01/19 (!) 160.5 kg (353 lb 13.4 oz)   07/25/19 (!) 176.6 kg (389 lb 5.3 oz)       Current Diet Order   Procedures    Diet Cardiac      Physical Exam  Constitutional: He is oriented to person, place, and time and well-developed, well-nourished, and in no distress.   HENT:   Head: Normocephalic and atraumatic.   Eyes: EOM are normal.   Neck: Normal range of motion. Neck supple.   Cardiovascular: Normal rate, regular rhythm and normal heart  sounds.   Pulmonary/Chest: Effort normal and breath sounds normal.   Abdominal: Soft. Bowel sounds are normal.   Musculoskeletal: Normal range of motion. He exhibits edema.   Patient with significant Lymphedema in the right lower extremity with chronic venous stasis changes. Mauricio's sign negative b/l.    Neurological: He is alert and oriented to person, place, and time.   Skin: Skin is warm and dry. There is erythema.   Slight erythema noted in the RLE.    Psychiatric: Affect normal.     Laboratory Data:  CBC  Recent Labs   Lab 08/02/19  1519 08/03/19  0537   WBC 4.58 3.68*   RBC 3.96* 3.71*   HGB 9.6* 9.0*   HCT 32.0* 29.8*    191   MCV 81* 80*   MCH 24.2* 24.3*   MCHC 30.0* 30.2*     CMP  Recent Labs   Lab 08/02/19  1519 08/03/19  0537   CALCIUM 9.6 9.3   PROT 7.6 7.2    138   K 5.5* 4.5   CO2 23 21*    109   BUN 39* 33*   CREATININE 1.2 1.0   ALKPHOS 140* 117   ALT 26 23   AST 34 30   BILITOT 0.4 0.4     POCT-Glucose  No results found for: POCTGLUCOSE  COAGS  Recent Labs   Lab 08/02/19  1519 08/03/19  0648   INR 1.1  --    APTT  --  86.4*     UA  Recent Labs   Lab 08/02/19  1723   COLORU Yellow   SPECGRAV 1.025   PHUR 5.0   PROTEINUA Negative     MICRO  Microbiology Results (last 7 days)     ** No results found for the last 168 hours. **        LIPID PANEL  Lab Results   Component Value Date    CHOL 124 07/10/2017     Lab Results   Component Value Date    HDL 23 (L) 07/10/2017     Lab Results   Component Value Date    LDLCALC 70.4 07/10/2017     Lab Results   Component Value Date    TRIG 153 (H) 07/10/2017     Lab Results   Component Value Date    CHOLHDL 18.5 (L) 07/10/2017       Diagnostic Results:  Imaging Results           US Lower Extremity Veins Bilateral (Final result)  Result time 08/02/19 21:52:55    Final result by Sohail Kline MD (08/02/19 21:52:55)                 Impression:      Deep venous thrombosis within the distal right femoral vein.    No evidence of left lower  extremity deep venous thrombosis.    This report was flagged in Epic as abnormal.      Electronically signed by: Sohail Kline MD  Date:    08/02/2019  Time:    21:52             Narrative:    EXAMINATION:  US LOWER EXTREMITY VEINS BILATERAL    CLINICAL HISTORY:  acute PE; Chronic atrial fibrillation    TECHNIQUE:  Duplex and color flow Doppler evaluation of the bilateral lower extremity veins was performed.    COMPARISON:  07/25/2019.    FINDINGS:  Right lower extremity:    There is thrombosis seen within the distal right femoral vein.  No evidence of clot involving the right common femoral, greater saphenous, popliteal, peroneal, posterior tibial and anterior tibial veins.    Left lower extremity:    No evidence of clot involving the bilateral common femoral, greater saphenous, femoral, popliteal, peroneal, anterior and posterior tibial veins.  Left common femoral and greater saphenous veins could not be fully compress but appear patent.  All venous structures demonstrate normal respiratory phasicity and augment adequately.  No evidence of soft tissue mass or Baker's cyst.                               NM Lung Scan Ventilation Perfusion (Final result)  Result time 08/02/19 19:27:55    Final result by Sohail Kline MD (08/02/19 19:27:55)                 Impression:      Single moderate to large perfusion defect within the left lung which would correspond with intermediate probability for potential pulmonary embolus.  Further evaluation may be performed with CTA chest PE protocol.      Electronically signed by: Sohail Kline MD  Date:    08/02/2019  Time:    19:27             Narrative:    EXAMINATION:  NM LUNG VENTILATION AND PERFUSION IMAGING    CLINICAL HISTORY:  Chest pain, acute, PE suspected, intermed prob, positive D-dimer;    TECHNIQUE:  15 mCi of xenon 133 aerosol and the subsequent IV administration of 5 mCi of Tc-99m-MAA, multiple images of the thorax were obtained in various  projections.    COMPARISON:  Chest radiograph from the same date.    FINDINGS:  Single moderate to large perfusion defect is seen within the left midlung zone.  No mismatched perfusion defects are seen within the right lung.  Normal ventilation is seen with no significant air trapping.                               X-Ray Chest 1 View (Final result)  Result time 08/02/19 16:26:25    Final result by Ray Andino MD (08/02/19 16:26:25)                 Impression:      Cardiomegaly with interval development of mild pulmonary vascular congestion.      Electronically signed by: Ray Andino MD  Date:    08/02/2019  Time:    16:26             Narrative:    EXAMINATION:  XR CHEST 1 VIEW    CLINICAL HISTORY:  Chest pain, unspecified    TECHNIQUE:  Single frontal view of the chest was performed.    COMPARISON:  07/25/2019.    FINDINGS:  Monitoring EKG leads are present.  The trachea is unremarkable.  There is unchanged appearance of cardiomegaly.  The hemidiaphragms are within normal limits.  There is no evidence of free air beneath the hemidiaphragms.  There are no pleural effusions.  There is no evidence of a pneumothorax.  There is no evidence of pneumomediastinum.  There is mild pulmonary vascular congestion, new compared to the prior exam.  There are degenerative changes in the osseous structures.                                ASSESSMENT/PLAN:   Acute Pulmonary Embolism  - Acute onset anginal pain associated with SOB   - Troponin negative   - F/u TTE for evidence of right ventricle strain following acute PE  - D dimer 1.05  - V/Q scan - Single moderate to large perfusion defect within the left lung which would correspond with intermediate probability for potential pulmonary embolus.  - B/l Lower extremity venous U/S - reveals thrombis in the Right distal femoral vein   - Heparin drip started   - F/u PTT and PT/INR (Previously on coumadin)  - Appreciate cardiology recs      Chronic Venous Stasis Dermatitis   - Patient  recently treated for cellulitis of RLE  - No Wbc elevation and VSS  - Will consult wound care      Chronic Atrial Fibrillation   -Will resume Metoprolol 100mg PO ER       Hyperthyroidism   - Patient missed endo appt for work up for long standing dx, likely contributing to his Afib  - TSH < .0100   - Ft4 2.95  - F/u TSHR AB, TSI and TPOAb     Code: Full   Diet: Cardiac   PPX: heparin and PPI     Dispo: Patient probable PE and acute DVT currently on Heparin drip. Wound care to eval chronic venous stasis.      Vladimir Su MD  [employer] [facility], PGY-2

## 2023-05-01 NOTE — PROGRESS NOTES
Called the patient to discuss results of PET/CT showing evidence of disease in the lung and hilum but no overt evidence of disease elsewhere  Based on this we again discussed that his metastatic disease has been effectively treated with craniotomy and post-operative SRT and that I would recommend proceeding with definitive intent treatment to his RUL primary  Based on our prior conversation I will place a referral to thoracic surgery for evaluation and will order PFTs to be done prior to this visit so as to better inform their discussion  I will also coordinate with Dr Amelie Anton for necessary changes to his planned systemic therapy  Should he, for whatever reason, not be a good candidate for resection, he should be referred back for consideration of definitive chemoRT       Minesh Garcia MD  Dept of Radiation Oncology
Quality 110: Preventive Care And Screening: Influenza Immunization: Influenza Immunization previously received during influenza season
Detail Level: Detailed
Quality 474: Zoster Vaccination Status: Shingrix vaccine was not administered for reasons documented by clinician (e.g. patient administered vaccine other than Shingrix, patient allergy or other medical reasons, patient declined or other patient reasons, vaccine not available or other system reasons)

## 2023-05-02 ENCOUNTER — OFFICE VISIT (OUTPATIENT)
Dept: PHYSICAL THERAPY | Facility: CLINIC | Age: 68
End: 2023-05-02

## 2023-05-02 ENCOUNTER — PATIENT OUTREACH (OUTPATIENT)
Dept: CASE MANAGEMENT | Facility: HOSPITAL | Age: 68
End: 2023-05-02

## 2023-05-02 DIAGNOSIS — G93.89 BRAIN MASS: Primary | ICD-10-CM

## 2023-05-02 NOTE — PROGRESS NOTES
OncSW referral received, chart review completed  Pt has consulted with [facility] at this time and will start tx tomorrow  MSW will outreach in the near future to assess for any needs

## 2023-05-03 ENCOUNTER — TELEPHONE (OUTPATIENT)
Dept: HEMATOLOGY ONCOLOGY | Facility: CLINIC | Age: 68
End: 2023-05-03

## 2023-05-03 ENCOUNTER — HOSPITAL ENCOUNTER (OUTPATIENT)
Dept: INFUSION CENTER | Facility: HOSPITAL | Age: 68
Discharge: HOME | End: 2023-05-03
Attending: INTERNAL MEDICINE

## 2023-05-03 DIAGNOSIS — C34.91 CARCINOMA OF RIGHT LUNG (HCC): ICD-10-CM

## 2023-05-03 DIAGNOSIS — C79.9 METASTATIC ADENOCARCINOMA (HCC): Primary | ICD-10-CM

## 2023-05-03 DIAGNOSIS — Z51.11 ENCOUNTER FOR CHEMOTHERAPY MANAGEMENT: Primary | ICD-10-CM

## 2023-05-03 PROBLEM — D70.1 CHEMOTHERAPY-INDUCED NEUTROPENIA (HCC): Status: ACTIVE | Noted: 2023-05-03

## 2023-05-03 PROBLEM — T45.1X5A CHEMOTHERAPY-INDUCED NEUTROPENIA (HCC): Status: ACTIVE | Noted: 2023-05-03

## 2023-05-03 RX ORDER — SODIUM CHLORIDE 9 MG/ML
20 INJECTION, SOLUTION INTRAVENOUS ONCE
OUTPATIENT
Start: 2023-05-10

## 2023-05-03 RX ORDER — CYANOCOBALAMIN 1000 UG/ML
1000 INJECTION, SOLUTION INTRAMUSCULAR; SUBCUTANEOUS ONCE
OUTPATIENT
Start: 2023-05-10 | End: 2023-05-10

## 2023-05-03 RX ORDER — ONDANSETRON HYDROCHLORIDE 8 MG/1
8 TABLET, FILM COATED ORAL EVERY 8 HOURS PRN
Qty: 20 TABLET | Refills: 3 | Status: SHIPPED | OUTPATIENT
Start: 2023-05-03

## 2023-05-03 RX ORDER — FOLIC ACID 1 MG/1
1 TABLET ORAL DAILY
Qty: 30 TABLET | Refills: 11 | Status: SHIPPED | OUTPATIENT
Start: 2023-05-03

## 2023-05-03 NOTE — TELEPHONE ENCOUNTER
Patient Call    Who are you speaking with? DARIO    If it is not the patient, are they listed on an active communication consent form? N/A   What is the reason for this call? Dario from [facility] on behalf of [employer] benefits is calling to notify the team of Dr Elli Schafer that the patients authorization for Chemotherapy was not approved because the patient's insurance does not include out of network services  Does this require a call back? No   If a call back is required, please list best call back number N/A   If a call back is required, advise that a message will be forwarded to their care team and someone will return their call as soon as possible  Did you relay this information to the patient?  Yes

## 2023-05-03 NOTE — PROGRESS NOTES
Chemo education provided to pt and his wife about the treatment plan which is now Nivolumab 360 mg IV on Day 1 with Alimta 500 mg/m2 IVP on Day 1 and Carboplatin AUC 5 IV on Day 1 every 3 weeks  Told pt that he may need neulasta on pro and will clarify with Dr and order  Pt told to start Rx folic acid today as he needs to be on this one week before starting treatment but he can start treatment and have Vit B 12 the same day  Rx for folic acid and Zofran sent to pt's local pharmacy  Chemo consent was signed and pt and wife aware that inf  will be phoning them to schedule

## 2023-05-03 NOTE — TELEPHONE ENCOUNTER
Phoned pt and reached him this morning to klet him know that treatment plan has changed since we got his Pet Ct scan results  Pt will not get single agent [facility] today and will come at 2 pm today for chemo teaching and to sign consent, ins Mihai Morin is pending for new plan

## 2023-05-04 ENCOUNTER — TELEPHONE (OUTPATIENT)
Dept: SURGICAL ONCOLOGY | Facility: CLINIC | Age: 68
End: 2023-05-04

## 2023-05-04 NOTE — TELEPHONE ENCOUNTER
Oncology Finance Advocacy Intake and Intervention  Oncology Finance Counselor/Advocate placed call to patient  This writer informed patient that this writer is here to assist patient with billing questions, financial assistance, payment/payment plans, quotes, copayment assistance, insurance optimization, and insurance navigation  This writer conducted a thorough benefit review of copayment, deductible, and out of pocket cost  This information is documented below and has been reviewed with patient  Copayment:  Deductible:  Out of Pocket Cost:$8,500 00  Remaining $6,022 80  Insurance optimization (Limited benefit vs self-pay):N/A  Patient assistance status:Outreach  Free Drug Applications:NO  Interventions:  Placed call to the patient and spoke with the spouse Bossman after I introduced myself I answered the inquiry into their financial sharing responsibilities regarding the out of pocket ($8,500 00) and the remainder being $6,022 80 before they are covered at 100%  I have sent a request to The [address] [place] [address] [phone] to possibly assist with their self pay balances  That should come in the mail within 7-10 business days  I was also going to apply for funding for the [facility] that was on the treatment but that has since changed to [address] and I will [facility]  for a Co Pay card  My contact information will be here [facility] if the patient or his spouse have any other questions or concerns that I can assist with  I will also add myself to the patient's Care Team           Information above was review thoroughly with patient and patient was advise of possible assistance programs/interventions  If any question arise patient can contact this writer at below information  This information was given to patient at time of contact  Lino Murcia  Phone:[phone]  Email: [email or web address]

## 2023-05-08 ENCOUNTER — OFFICE VISIT (OUTPATIENT)
Dept: NEUROSURGERY | Facility: CLINIC | Age: 68
End: 2023-05-08

## 2023-05-08 ENCOUNTER — HOSPITAL ENCOUNTER (OUTPATIENT)
Dept: PULMONOLOGY | Facility: HOSPITAL | Age: 68
Discharge: HOME | End: 2023-05-08
Attending: STUDENT IN AN ORGANIZED HEALTH CARE EDUCATION/TRAINING PROGRAM

## 2023-05-08 VITALS
SYSTOLIC BLOOD PRESSURE: 158 MMHG | HEIGHT: 69 IN | OXYGEN SATURATION: 97 % | BODY MASS INDEX: 27.46 KG/M2 | DIASTOLIC BLOOD PRESSURE: 88 MMHG | WEIGHT: 185.4 LBS | TEMPERATURE: 98.6 F | HEART RATE: 85 BPM

## 2023-05-08 DIAGNOSIS — G93.89 BRAIN MASS: ICD-10-CM

## 2023-05-08 DIAGNOSIS — G93.5 BRAIN COMPRESSION (HCC): ICD-10-CM

## 2023-05-08 DIAGNOSIS — C34.91 CARCINOMA OF RIGHT LUNG (HCC): Primary | ICD-10-CM

## 2023-05-08 DIAGNOSIS — C34.91 CARCINOMA OF LUNG, RIGHT (HCC): ICD-10-CM

## 2023-05-08 DIAGNOSIS — G93.6 CEREBRAL EDEMA (HCC): ICD-10-CM

## 2023-05-08 RX ORDER — ALBUTEROL SULFATE 2.5 MG/3ML
2.5 SOLUTION RESPIRATORY (INHALATION) ONCE
Status: COMPLETED | OUTPATIENT
Start: 2023-05-08 | End: 2023-05-08

## 2023-05-08 RX ADMIN — ALBUTEROL SULFATE 2.5 MG: 2.5 SOLUTION RESPIRATORY (INHALATION) at 16:29

## 2023-05-08 NOTE — PROGRESS NOTES
Patient Id: Abran Monte is a 69 y o  male        Handedness: Right     Assessment/Plan:    Diagnoses and all orders for this visit:    Carcinoma of right lung ([address] )    Brain compression ([address] )    Cerebral edema ([address] )    Brain mass        Discussion and summary:   1  Status post synchronous diagnosis of non-small cell lung cancer status post right frontal craniotomy for perirolandic mass, 3/23/2023  We discussed the diagnosis and natural history of non-small cell lung cancer as well as indications adjuvant therapy  He is recently undergone a stereotactic radiosurgery to his surgical bed  He is otherwise recovering well  He was discussed in thoracic tumor board and is undergoing neoadjuvant therapy with consideration of thoracic resection due to his lack of other metastatic disease on CT PET scan  At this juncture I believe I can begin to see him back as needed  I will defer to medical and radiation oncology for further follow-up  Should there be any indication of progressive or recurrent cranial disease I am available  2   Incisional healing  Incision is healing well, however there is 1 area of scabbing  I counseled him on placing triple antibiotic ointment on this prior to showering  Should there be any dehiscence or erythema he should reach out to the office for evaluation  05/08/2023- EQ5DL- 1,1,1,2,1, VAS 90%, ECOG 0, KPS 90    Chief Complaint: Follow-up        HPI:   This is a very pleasant 61-year-old gentleman who presented to the hospital with discoordination of his left upper extremity with numbness and weakness  MRI demonstrated a right frontal perirolandic mass with significant vasogenic edema  He underwent surgical resection of this 3/23/2023  He recovered well and was discharged to rehab  He is now status post SRS and returns for evaluation  Denies any new complaints  He does still have some minor fatigue  Review of systems obtained by the MA reviewed and updated below  Review of Systems   Constitutional: Negative  HENT: Negative for tinnitus  Eyes: Negative for visual disturbance  Respiratory: Negative for cough, chest tightness, shortness of breath and wheezing  Cardiovascular: Negative  Gastrointestinal: Negative  Endocrine: Negative  Genitourinary: Negative  Musculoskeletal: Negative for back pain, neck pain and neck stiffness  Allergic/Immunologic: Negative  Neurological: Positive for speech difficulty (not any more than usual), numbness (not any worse than normal- due to previous stroke) and headaches (every now and then)  Negative for dizziness, tremors, seizures, weakness and light-headedness  Psychiatric/Behavioral: Negative for sleep disturbance  Physical Exam  Vitals:    05/08/23 1456   BP: 158/88   Pulse: 85   Temp: 98 6 °F (37 °C)   SpO2: 97%   He is well appearing  Affect is appropriate  His BMI is Body mass index is 27 38 kg/m²  Tatum Bob He is awake alert and oriented  Hearing and vision are grossly intact  His pupils are equal round reactive to light  His extraocular movements are intact  His face is symmetric  Tongue is midline  Facial sensation is intact and symmetric throughout  Shoulder shrug is 5/5  There is no drift or dysmetria  He has full strength in his bilateral upper and lower extremities  He has normal muscle tone muscle bulk  His biceps reflexes and patellar reflexes are 2+ and symmetric  Nato sign negative bilaterally  Sensation intact to light touch and pinprick throughout  His gait is normal     His heart rate is regular  Normal respiratory effort  Abdomen nondistended  Radial pulses 2+  His incision is healing well with the exception of one small area of scabbing      The following portions of the patient's history were reviewed and updated as appropriate: allergies, current medications, past family history, past medical history, past social history, past surgical history and problem list     Active Ambulatory Problems     Diagnosis Date Noted   • Negative depression screening 02/24/2020   • Overweight (BMI 25 0-29 9) 02/24/2020   • Essential hypertension 09/11/2020   • Annual physical exam 09/11/2020   • CVA (cerebral vascular accident) ([address] ) 08/12/2021   • Hypercholesterolemia 09/20/2021   • Brain mass 03/20/2023   • Brain compression ([address] ) 03/20/2023   • Cerebral edema ([address] ) 03/20/2023   • Lung nodule 03/21/2023   • Metastatic adenocarcinoma ([address] ) 2023   • Carcinoma of right lung ([address] ) 04/13/2023   • Encounter for central line care 05/01/2023   • Chemotherapy-induced neutropenia ([address] ) 05/03/2023     Resolved Ambulatory Problems     Diagnosis Date Noted   • Del Castillo catheter in place 03/24/2023     Past Medical History:   Diagnosis Date   • Cancer ([address] ) 2001   • Hypertension    • Prostate cancer ([address] ) 2001   • Rectal bleeding    • Stroke [facility])        Past Surgical History:   Procedure Laterality Date   • COLONOSCOPY     • CRANIOTOMY Right 3/23/2023    Procedure: Right frontal CRANIOTOMY IMAGE-GUIDED FOR TUMOR;  Surgeon: Alicia Leong MD;  Location[facility];  Service: Neurosurgery   • IR PORT PLACEMENT  4/27/2023   • OK CYSTOURETHROSCOPY N/A 3/23/2023    Procedure: EUA, DEL CASTILLO INSERTION;  Surgeon: Mary Ann Sánchez MD;  Location: [facility];  Service: Urology   • PROSTATECTOMY  2001   • TONSILLECTOMY           Current Outpatient Medications:   •  acetaminophen (TYLENOL) 325 mg tablet, Take 3 tablets (975 mg total) by mouth every 8 (eight) hours as needed for mild pain or headaches, Disp: 30 tablet, Rfl: 0  •  amLODIPine (NORVASC) 10 mg tablet, Take 1 tablet (10 mg total) by mouth daily, Disp: 90 tablet, Rfl: 3  •  atorvastatin (LIPITOR) 40 mg tablet, Take 1 tablet (40 mg total) by mouth daily after dinner, Disp: 30 tablet, Rfl: 0  •  folic acid (KP Folic Acid) 1 mg tablet, Take 1 tablet (1 mg total) by mouth daily, Disp: 30 tablet, Rfl: 11  •  losartan (COZAAR) 50 mg tablet, Take 1 tablet (50 mg total) by mouth daily, Disp: 90 tablet, Rfl: 3  •  ondansetron (ZOFRAN) 8 mg tablet, Take 1 tablet (8 mg total) by mouth every 8 (eight) hours as needed for nausea or vomiting, Disp: 20 tablet, Rfl: 3  No current facility-administered medications for this visit  Facility-Administered Medications Ordered in Other Visits:   •  albuterol inhalation solution 2 5 mg, 2 5 mg, Nebulization, Once, Charlie Sears MD    Results/Data: Imaging reviewed in detail with patient as well as reports

## 2023-05-09 ENCOUNTER — TELEPHONE (OUTPATIENT)
Dept: HEMATOLOGY ONCOLOGY | Facility: CLINIC | Age: 68
End: 2023-05-09

## 2023-05-09 DIAGNOSIS — D70.1 CHEMOTHERAPY-INDUCED NEUTROPENIA (HCC): ICD-10-CM

## 2023-05-09 DIAGNOSIS — C34.91 CARCINOMA OF RIGHT LUNG (HCC): ICD-10-CM

## 2023-05-09 DIAGNOSIS — T45.1X5A CHEMOTHERAPY-INDUCED NEUTROPENIA (HCC): ICD-10-CM

## 2023-05-09 DIAGNOSIS — C79.9 METASTATIC ADENOCARCINOMA (HCC): Primary | ICD-10-CM

## 2023-05-09 NOTE — TELEPHONE ENCOUNTER
Spoke with wife, Riley Shepard, she said there should only be 3 cycles of treatment, but we have 4 scheduled  Can you please clarify how many there should be? Thank you!

## 2023-05-09 NOTE — TELEPHONE ENCOUNTER
Wife phoned office to say chemo has not been scheduled looks like a note was not routed to inf   Pt has been approved for chemo but Neulasta is pending as ins states the plan is low risk for neutropenia   Pt needs Nivolumab, alimta and Carboplatin [address]

## 2023-05-09 NOTE — TELEPHONE ENCOUNTER
While we try to accommodate patient requests, our priority is to schedule treatment according to Doctor's orders and site availability  Does the Provider use the intake sheet or checkout note? What would be a preferred day of the week that would work best for your infusion appointment? Any day  Do you prefer mornings or afternoons for your appointments? mornings  Are there any days or dates that do not work for your schedule, including any upcoming vacations? 6/22  We are going to try our best to schedule you at the infusion center closest to your home  In the event that we are unable to what would be your next preferred infusion site or sites? [address]                 Do you have transportation to take you to all of your appointments?  yes  Would you like the infusion center to draw labs from your port? (disregard if patient doesn't have a port or need labs for infusion appointment) yes

## 2023-05-10 ENCOUNTER — OFFICE VISIT (OUTPATIENT)
Dept: CARDIAC SURGERY | Facility: CLINIC | Age: 68
End: 2023-05-10

## 2023-05-10 VITALS
DIASTOLIC BLOOD PRESSURE: 76 MMHG | HEIGHT: 69 IN | RESPIRATION RATE: 17 BRPM | WEIGHT: 183.2 LBS | BODY MASS INDEX: 27.13 KG/M2 | HEART RATE: 82 BPM | TEMPERATURE: 98.7 F | OXYGEN SATURATION: 93 % | SYSTOLIC BLOOD PRESSURE: 117 MMHG

## 2023-05-10 DIAGNOSIS — C34.91 CARCINOMA OF RIGHT LUNG (HCC): Primary | ICD-10-CM

## 2023-05-10 NOTE — ASSESSMENT & PLAN NOTE
Mr Ever Keene is a 69year old with a right upper lobe lung nodule and hilar adenopathy with brain metastasis and brain mass pathology consistent with metastatic adenocarcinoma of the lung  He has been seen my medical oncology and radiation oncology  We would like to see him back after a PET-CT about 2 weeks after completion of his chemotherapy/immunotherapy  We would also like to have an updated brain MRI  If there is need for further tissue sampling, we are available at any time to help obtain further tissue  Dr Matthew Ortega discussed if this respondes well to chemotherapy/immunotherapy we would discuss surgical resection at that point  We will see him back again his treatmnet is complete with updated PET-CT  All questions were answered and he is in agreement with his plan

## 2023-05-10 NOTE — PROGRESS NOTES
Thoracic Consult  Assessment/Plan:    Carcinoma of right lung [facility])  Mr Seda Posada is a 69year old with a right upper lobe lung nodule and hilar adenopathy with brain metastasis and brain mass pathology consistent with metastatic adenocarcinoma of the lung  He has been seen my medical oncology and radiation oncology  We would like to see him back after a PET-CT about 2 weeks after completion of his chemotherapy/immunotherapy  We would also like to have an updated brain MRI  If there is need for further tissue sampling, we are available at any time to help obtain further tissue  Dr Les Goldberg discussed if this respondes well to chemotherapy/immunotherapy we would discuss surgical resection at that point  We will see him back again his treatmnet is complete with updated PET-CT  All questions were answered and he is in agreement with his plan  Diagnoses and all orders for this visit:    Carcinoma of right lung [facility])          Thoracic History   Diagnosis: Right upper lobe gaurav nodule, hilar adenopathy, right brain mass   Procedures/Surgeries: right frontal craniotomy on 3/23/23   Pathology: Brain mass: metastatic non-small cell carcinoma favoring lung adenocarcinoma   Adjuvant Therapy:       Oncology History   Metastatic adenocarcinoma ([facility] )   2023 Initial Diagnosis    Metastatic adenocarcinoma ([facility] )     3/23/2023 Biopsy    Brain, right frontal mass (biopsy):  - Metastatic non-small cell carcinoma     Comment:  - Tumor cells stain diffusely for CAM5 2, CKAE1/3, CK7, TTF1 and NapsinA with absent CK20, p63, CK5/6, p40 expression  This immunopanel favors a metastatic lung adenocarcinoma         5/18/2023 -  Chemotherapy    cyanocobalamin, 1,000 mcg, Intramuscular, Once, 0 of 4 cycles  pegfilgrastim (NEULASTA ONPRO), 6 mg, Subcutaneous, Once, 0 of 2 cycles  fosaprepitant (EMEND) IVPB, 150 mg, Intravenous, Once, 0 of 6 cycles  CARBOplatin (PARAPLATIN) IVPB (GOG AUC DOSING), 642 5 mg, Intravenous, Once, 0 of 6 cycles  pemetrexed (ALIMTA) chemo infusion, 500 mg/m2 = 980 mg, Intravenous, Once, 0 of 6 cycles     Carcinoma of right lung ([facility] )   4/13/2023 Initial Diagnosis    Carcinoma of right lung ([facility] )     4/13/2023 -  Cancer Staged    Staging form: Lung, AJCC 8th Edition  - Clinical: Stage JAY (cT3, cN1, cM1b) - Signed by Babita Franco MD on 4/13/2023 5/18/2023 -  Chemotherapy    cyanocobalamin, 1,000 mcg, Intramuscular, Once, 0 of 4 cycles  pegfilgrastim (NEULASTA ONPRO), 6 mg, Subcutaneous, Once, 0 of 2 cycles  fosaprepitant (EMEND) IVPB, 150 mg, Intravenous, Once, 0 of 6 cycles  CARBOplatin (PARAPLATIN) IVPB (GOG AUC DOSING), 642 5 mg, Intravenous, Once, 0 of 6 cycles  pemetrexed (ALIMTA) chemo infusion, 500 mg/m2 = 980 mg, Intravenous, Once, 0 of 6 cycles         Subjective:    Patient ID: Jae Denson is a 69 y o  male  ECOG 0    HPI   Mr Za Butcher is a 69year old male with PMH former tobacco abuse aobut 30pk/yr history, HTN, HLDwho was referred to our office by Dr Gilberto Singh for evaluation of a right upper lobe nodule  He initially presented with left hemiparesis, headaches, cough and LEHMAN and was foung to have a right sided brain mass as well as a right upper lung mass with hilar adenopathy  He underwent a right frontal craniotomy on 3/23/23 and pathology was consistent with metastatic non-small cell carcinoma favoring lung adenocarcinoma  He is being seen by radiation oncology and is completing SRS/SRT  He is planned to start chemotherapy and immunotherapy     CT chest 3/20/23 was personally reviewed by myself and showed 3 8 x 3 5cm right upper lobe gaurav mass with associated overlying pleural tag and adjacent thickening  Right hilar LAD  PET-CT 4/28/23 was personally reviewed by myself and reveals a 3 6 x 3 4cm mass touching the right lateral pleural surgface, SUV 20  Multifocal right hilar adenopathy SUV max 6 6   Trace nonspecific activity within a right paratracheal lymph node which measures 0 9 cm in short axis dimension, SUV max 2 0     2023 PFTs showed FEV1/FVC 77 6%, FEV1 2 85L 90% predicted, DLCO 88%    On discussion he is feeling okay  He has had his port placed in planning for chemotherapy/immunotherapy  He has had a cough but otherwise is feeling well  No hemoptysis  No unintentional weight loss         The following portions of the patient's history were reviewed and updated as appropriate: allergies, current medications, past family history, past medical history, past social history, past surgical history and problem list     Past Medical History:   Diagnosis Date   • Cancer ([facility] )     Receint lung and brain lesions and prostate cancer in    • Hypertension    • Prostate [facility] ([facility] )    • Rectal bleeding    • Stroke [facility])             Past Surgical History:   Procedure Laterality Date   • COLONOSCOPY     • CRANIOTOMY Right 3/23/2023    Procedure: Right frontal CRANIOTOMY IMAGE-GUIDED FOR TUMOR;  Surgeon: Miya Macario MD;  Location: [facility];  Service: Neurosurgery   • IR PORT PLACEMENT  2023   • OK CYSTOURETHROSCOPY N/A 3/23/2023    Procedure: EUA, DEL CASTILLO INSERTION;  Surgeon: Marixa Rogers MD;  Location: [facility];  Service: Urology   • PROSTATECTOMY     • TONSILLECTOMY        Family History   Problem Relation Age of Onset   • Dementia Mother            • Breast cancer Sister            • Prostate cancer Brother         Received Radiation      Social History     Socioeconomic History   • Marital status: /Civil Union     Spouse name: Not on file   • Number of children: Not on file   • Years of education: Not on file   • Highest education level: Not on file   Occupational History   • Not on file   Tobacco Use   • Smoking status: Former     Packs/day: 1 00     Years: 15 00     Pack years: 15 00     Types: Cigarettes     Quit date: 44     Years since quittin 3     Passive exposure: Never   • Smokeless tobacco: Never   • Tobacco comments:     i quit when i was 28  not sure of exact dates   Vaping Use   • Vaping Use: Never used   Substance and Sexual Activity   • Alcohol use: Yes     Alcohol/week: 6 0 standard drinks     Types: 6 Cans of beer per week     Comment: socially   • Drug use: Not Currently     Types: Marijuana   • Sexual activity: Yes     Partners: Female     Birth control/protection: None   Other Topics Concern   • Not on file   Social History Narrative   • Not on file     Social Determinants of Health     Financial Resource Strain: Not on file   Food Insecurity: No Food Insecurity   • Worried About Running Out of Food in the Last Year: Never true   • Ran Out of Food in the Last Year: Never true   Transportation Needs: No Transportation Needs   • Lack of Transportation (Medical): No   • Lack of Transportation (Non-Medical): No   Physical Activity: Not on file   Stress: Not on file   Social Connections: Not on file   Intimate Partner Violence: Not on file   Housing Stability: Low Risk    • Unable to Pay for Housing in the Last Year: No   • Number of Places Lived in the Last Year: 1   • Unstable Housing in the Last Year: No      Review of Systems   Constitutional: Negative for chills, diaphoresis and unexpected weight change  HENT: Negative  Eyes: Negative  Respiratory: Positive for cough  Negative for shortness of breath and wheezing  Cardiovascular: Negative for chest pain and leg swelling  Gastrointestinal: Negative for abdominal pain, diarrhea and nausea  Endocrine: Negative  Genitourinary: Negative  Musculoskeletal: Negative  Skin: Negative  Allergic/Immunologic: Negative  Neurological: Negative  Hematological: Negative for adenopathy  Does not bruise/bleed easily  Psychiatric/Behavioral: Negative  All other systems reviewed and are negative  Objective:   Physical Exam  Vitals reviewed  Constitutional:       General: He is not in acute distress  Appearance: Normal appearance   He is not "ill-appearing  HENT:      Head: Normocephalic  Nose: Nose normal       Mouth/Throat:      Mouth: Mucous membranes are moist    Eyes:      Pupils: Pupils are equal, round, and reactive to light  Cardiovascular:      Rate and Rhythm: Normal rate and regular rhythm  Heart sounds: Normal heart sounds  Pulmonary:      Effort: Pulmonary effort is normal       Breath sounds: Normal breath sounds  No wheezing, rhonchi or rales  Abdominal:      Palpations: Abdomen is soft  Musculoskeletal:         General: No swelling  Normal range of motion  Lymphadenopathy:      Cervical: No cervical adenopathy  Skin:     General: Skin is warm  Neurological:      General: No focal deficit present  Mental Status: He is alert and oriented to person, place, and time  Psychiatric:         Mood and Affect: Mood normal      /76 (BP Location: Right arm, Patient Position: Sitting, Cuff Size: Standard)   Pulse 82   Temp 98 7 °F (37 1 °C) (Temporal)   Resp 17   Ht 5' 9\" (1 753 m)   Wt 83 1 kg (183 lb 3 2 oz)   SpO2 93%   BMI 27 05 kg/m²     No Chest XR results available for this patient  No CT Chest results available for this patient  CT chest abdomen pelvis w contrast    Result Date: 3/21/2023  Narrative CT CHEST, ABDOMEN AND PELVIS WITH IV CONTRAST INDICATION:   metastatic brain mass, RUL lung mass seen on CT head neck  COMPARISON:  None  TECHNIQUE: CT examination of the chest, abdomen and pelvis was performed  Axial, sagittal, and coronal 2D reformatted images were created from the source data and submitted for interpretation  Radiation dose length product (DLP) for this visit:  1044 99 mGy-cm   This examination, like all CT scans performed in the [facility], was performed utilizing techniques to minimize radiation dose exposure, including the use of iterative reconstruction and automated exposure control   IV Contrast:  100 mL of iohexol (OMNIPAQUE) Enteric Contrast: Enteric " contrast was administered  FINDINGS: CHEST LUNGS:  Right upper lobe lung mass seen measuring 3 8 x 3 5 cm  A pleural tag is seen extending from this mass with associated minimal overlying pleural thickening appendix There is no extension of the neck mass to the chest wall There is no contralateral suspicious lung nodule PLEURA:  Mild pleural thickening overlying the area of the right upper lobe mass HEART/GREAT VESSELS: Heart is unremarkable for patient's age  No thoracic aortic aneurysm  Ascending aorta measures 4 2 cm MEDIASTINUM AND ROSEMARY:  Lower right paratracheal lymph nodes are seen measuring about 7 5 mm Subcarinal lymph nodes are seen measuring 6 mm Enlarged right hilar lymph node seen measuring about 1 7 cm CHEST WALL AND LOWER NECK:  No significant axillary lymph node enlargement seen there are no lymphadenopathy in the lower neck ABDOMEN LIVER/BILIARY TREE:  No focal liver lesion GALLBLADDER:  No calcified gallstones  No pericholecystic inflammatory change  SPLEEN:  Unremarkable  PANCREAS:  Unremarkable  ADRENAL GLANDS:  Unremarkable  KIDNEYS/URETERS:  Right renal cyst seen STOMACH AND BOWEL:  No abnormal dilation of the small bowel loops seen Ileocecal junction appear unremarkable The stomach is mildly distended  Mild thickening of the antrum, nonspecific APPENDIX:  No findings to suggest appendicitis  ABDOMINOPELVIC CAVITY:  No ascites  No pneumoperitoneum  Small para-aortic lymph nodes do not meet the criteria for pathological alignment on the bases VESSELS:  Celiac trunk, SMA appear unremarkable EMMA appear unremarkable PELVIS REPRODUCTIVE ORGANS:  Unremarkable for patient's age  URINARY BLADDER:  Unremarkable   ABDOMINAL WALL/INGUINAL REGIONS:  Umbilical hernia seen containing fat OSSEOUS STRUCTURES:  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 ,  Congenital No lytic destructive changes     Impression 3 8 x 3 5 cm right upper lobe lung mass with associated overlying pleural tag and adjacent to pleural thickening, this may be due to pleural reaction or mild pleural invasion No contralateral lung nodules or mass Right hilar lymphadenopathy  Small lower right paratracheal left paratracheal lymph node do not meet the criteria for pathologic enlargement on the bases of size or morphology There is no CT evidence of for metastatic disease in the abdomen and pelvis No lytic destructive lesion in the visualized bony skeleton The study was marked in EPIC for significant notification  Workstation performed: [id number]      NM PET CT skull base to mid thigh    Result Date: 4/28/2023  Narrative PET/CT SCAN INDICATION: C79 9: Secondary malignant neoplasm of unspecified site Newly diagnosed metastatic adenocarcinoma of the lung  Evaluate disease extent  MODIFIER: PS COMPARISON: No prior PET scans  Prior CT of chest abdomen and pelvis, 3/20/2023 CELL TYPE: Metastatic non-small cell carcinoma, diagnosed from resection of right frontal brain mass TECHNIQUE:   8 6 mCi F-18-FDG administered IV  Multiplanar attenuation corrected and non attenuation corrected PET images are available for interpretation, and contiguous, low dose, axial CT sections were obtained from the skull base through the femurs  Intravenous contrast material was not utilized  This examination, like all CT scans performed in the [facility], was performed utilizing techniques to minimize radiation dose exposure, including the use of iterative reconstruction and automated exposure control  Fasting serum glucose: 94 mg/dl FINDINGS: VISUALIZED BRAIN: Postsurgical changes, right frontal lobe  Please refer to prior MRI reports HEAD/NECK: There is a physiologic distribution of FDG  No FDG avid cervical adenopathy is seen  CT images: Unremarkable   CHEST: -Right upper lobe lobular marginated 3 6 x 3 4 cm mass touching the right lateral pleural surface, SUV max 20 0  - Multifocal FDG avid right hilar adenopathy, SUV max 6 6 - Trace nonspecific activity within a right paratracheal lymph node which measures 0 9 cm in short axis dimension, SUV max 2 0  Otherwise no evidence of FDG avid mediastinal adenopathy CT images: [id number]-[id number] catheter is present  No pleural or pericardial effusion  There is coronary artery calcification ABDOMEN: No FDG avid soft tissue lesions are seen  CT images: Shotty retroperitoneal lymph nodes demonstrate no abnormal activity  There is no ascites or hydronephrosis  There is an umbilical adipose containing hernia PELVIS: No FDG avid soft tissue lesions are seen  CT images: Small left inguinal adipose containing hernia  OSSEOUS STRUCTURES: No FDG avid lesions are seen  CT images: Postsurgical changes right frontal bone  Grade 1 anterolisthesis and degenerative disc disease at L5-S1  Than     Impression 1  FDG avid right upper lobe mass in keeping with primary bronchogenic carcinoma of the lung 2  FDG avid multifocal right hilar adenopathy 3  Trace nonspecific activity within a right paratracheal lymph node with short axis diameter 0 9 cm  4  No evidence of FDG avid metastatic disease within the neck, abdomen, pelvis, or skeleton Workstation performed: [id number]      No Barium Swallow results available for this patient

## 2023-05-10 NOTE — PROGRESS NOTES
"Daily Note     Today's date: 2023  Patient name: Andrew Lay  : 1955  MRN: [id number]  Referring provider: Akhil Parsons PA-C  Dx:   Encounter Diagnosis     ICD-10-CM    1  Brain mass  G93 89                      Subjective: Walking daily sometimes a couple times a day ~ 30 min  Able to go farther and faster, go over uneven terrain  Struggle getting down to/from floor      Objective: See treatment diary below      Assessment: Tolerated treatment well  Progressed POC to pt elio with monitor sx's r/o rx session  Pt reports grinding BL posterior hips with walking   Pt focus on updating HEP as limited PT 1x/week 2* copay   Patient would benefit from continued PT      Plan: Continue per plan of care  Re-eval Date:     Date      Visit Count 1 2 3     FOTO        Pain In        Pain Out             Precautions:lung/brain cancer, currently receiving treatments  Manuals                                        Neuro Re-Ed         Patient education about POC and diagnoses  Performed 15 min                                Trial exercises next session, create HEP        SLS  3x30\" ea review     Step up with holds  8\"  15x 2 ea  1 UE 8\" firm/foam  15x ea BL 0-1UE     Ther Ex        Upright bike   L3 10 min  Focus cardiovascular endurance  Cues pacing   L3 10 min  Focus cardiovascular endurance  Cues pacing  Focus RPE  2-3 warm up 2 min  4-6 2 minx 3  7-8 30 sec/min  2-3 cool down     Standing SLR   Foam  2x10 with 3\" Review DC      Resisted sidestepping   Green  6 laps 10 feet Review DC     Squats with functional reach  10x   Cues   Yellow wt bal  Foam  3x10     Piriformis stretch   3x30\"     HS stretch   3x30\"                     Ther Activity        Floor negotiation w/ dynamic reach   Max cues  10 min             Gait Training                        Modalities                             Access Code: [email or web address]  URL: [email or web address]  Date: 2023  Prepared by: " Noelle Greco    Exercises  - Standing Hip Flexion AROM  - 1 x daily - 7 x weekly - 3 sets - 10 reps  - Standing Hip Abduction  - 1 x daily - 7 x weekly - 3 sets - 10 reps  - Standing Hip Extension with Chair  - 1 x daily - 7 x weekly - 3 sets - 10 reps  - Side Stepping with Resistance at Thighs  - 1 x daily - 7 x weekly - 3 sets - 10 reps  - Standing Knee Flexion AROM  - 1 x daily - 7 x weekly - 3 sets - 30 sec hold  - Step Up  - 1 x daily - 7 x weekly - 3 sets - 10 reps      Access Code: [email or web address]  URL: [email or web address]  Date: 05/11/2023  Prepared by: Noelle Greco    Exercises  - ITB Stretch at [address]  - 1 x daily - 7 x weekly - 3 sets - 30 sec hold  - Seated Piriformis Stretch with Trunk Bend  - 1 x daily - 7 x weekly - 3 sets - 30 sec hold

## 2023-05-11 ENCOUNTER — OFFICE VISIT (OUTPATIENT)
Dept: PHYSICAL THERAPY | Facility: CLINIC | Age: 68
End: 2023-05-11

## 2023-05-11 DIAGNOSIS — G93.89 BRAIN MASS: Primary | ICD-10-CM

## 2023-05-12 NOTE — PROGRESS NOTES
"Daily Note     Today's date: 5/15/2023  Patient name: Josh Frazier  : 1955  MRN: [id number]  Referring provider: Mansi Escudero PA-C  Dx:   Encounter Diagnosis     ICD-10-CM    1  Brain mass  G93 89                      Subjective: I start chemo this Thursday  Hoping to shrink tumor in lung so they can operate  Walking every day, feel ok, no shortness of breath, just fatigue easier   I noticed my feet/ankles are getting swollen slightly  Drinking gatorade      Objective: See treatment diary below      Assessment: Tolerated treatment well  Pt educ with walking to benefit with monitor of SPO2/RPE  Pt discussed benefit with compression BL LE with exer/educ nutrition related to edema  Pt demon decline with ambul during incline with min SOB noted   Patient exhibited good technique with therapeutic exercises      Plan: Hold     Re-eval Date:     Date 4/18 5/2 5/11 5/15    Visit Count 1 2 3 4    FOTO        Pain In        Pain Out             Precautions:lung/brain cancer, currently receiving treatments          Manuals            5 min  BL hip flex/quad stretch supine                            Neuro Re-Ed         Patient education about POC and diagnoses (tumor on lung/SOB)  Performed 15 min    Performed 10 min                            Trial exercises next session, create HEP        SLS  3x30\" ea review EO/ firm & foam  30\" ea    Step up with holds  8\"  15x 2 ea  1 UE 8\" firm/foam  15x ea BL 0-1UE 8\" firm  8\" +Foam  10x 0UE    Ther Ex        Upright bike   L3 10 min  Focus cardiovascular endurance  Cues pacing   L3 10 min  Focus cardiovascular endurance  Cues pacing  Focus RPE  2-3 warm up 2 min  4-6 2 minx 3  7-8 30 sec/min  2-3 cool down L3-4 10 min  Focus cardiovascular endurance  Cues pacing  Focus RPE  2-3 warm up 2 min  4-6 2 minx 3  7-8 30 sec/min  2-3 cool down    Standing SLR   Foam  2x10 with 3\" Review DC      Resisted sidestepping   Green  6 laps 10 feet Review DC     Squats with functional " "reach  10x   Cues   Yellow wt bal  Foam  3x10 Invert BOSU  10x 5\"  W/ function reach    Piriformis stretch   3x30\"     HS stretch   3x30\"     Ambul outside    Outside  Incline focus  SPO2 monitor  90-97%  8 min            Ther Activity        Floor negotiation w/ dynamic reach   Max cues  10 min             Gait Training            Outside  Incline focus  SPO2 monitor  90-97%  8 min            Modalities                             Access Code: [email or web address]  URL: [email or web address]  Date: 05/02/2023  Prepared by: Rossy Racer    Exercises  - Standing Hip Flexion AROM  - 1 x daily - 7 x weekly - 3 sets - 10 reps  - Standing Hip Abduction  - 1 x daily - 7 x weekly - 3 sets - 10 reps  - Standing Hip Extension with Chair  - 1 x daily - 7 x weekly - 3 sets - 10 reps  - Side Stepping with Resistance at Thighs  - 1 x daily - 7 x weekly - 3 sets - 10 reps  - Standing Knee Flexion AROM  - 1 x daily - 7 x weekly - 3 sets - 30 sec hold  - Step Up  - 1 x daily - 7 x weekly - 3 sets - 10 reps      Access Code: [email or web address]  URL: [email or web address]  Date: 05/11/2023  Prepared by: Rossy Racer    Exercises  - ITB Stretch at [address]  - 1 x daily - 7 x weekly - 3 sets - 30 sec hold  - Seated Piriformis Stretch with Trunk Bend  - 1 x daily - 7 x weekly - 3 sets - 30 sec hold       "

## 2023-05-15 ENCOUNTER — OFFICE VISIT (OUTPATIENT)
Dept: PHYSICAL THERAPY | Facility: CLINIC | Age: 68
End: 2023-05-15

## 2023-05-15 ENCOUNTER — PATIENT OUTREACH (OUTPATIENT)
Dept: CASE MANAGEMENT | Facility: HOSPITAL | Age: 68
End: 2023-05-15

## 2023-05-15 DIAGNOSIS — C34.91 CARCINOMA OF RIGHT LUNG (HCC): ICD-10-CM

## 2023-05-15 DIAGNOSIS — D70.1 CHEMOTHERAPY-INDUCED NEUTROPENIA (HCC): ICD-10-CM

## 2023-05-15 DIAGNOSIS — G93.89 BRAIN MASS: Primary | ICD-10-CM

## 2023-05-15 DIAGNOSIS — C79.9 METASTATIC ADENOCARCINOMA (HCC): Primary | ICD-10-CM

## 2023-05-15 DIAGNOSIS — T45.1X5A CHEMOTHERAPY-INDUCED NEUTROPENIA (HCC): ICD-10-CM

## 2023-05-15 NOTE — PROGRESS NOTES
Biopsychosocial and Barriers Assessment    Cancer Diagnosis: NSCLC, metastatic   Home/Cell Phone: [phone]  Emergency Contact: wife, Charles Tineo  Marital Status:   Interpretation concerns, speaks another language, preferred language: speaks English  Cultural concerns: none noted  Ability to read or write: independent    Caregiver/Support: wife Leelee Gillis: adult daughter lives in [place]  Child/Elder care: NA    Housing: live local to [facility] [address]: [address]  Lives With: wife  Daily Living Activities: min assist at times  Durable Medical Equipment: owns a cane  Ambulation: independent    Preferred Pharmacy:   High co-pays with insurance: reports no concerns  High co-pays with medication coverage: reports no concerns  No medication coverage:  NA    Primary Care Provider: Dr Christine Capone  Hx of [address]: none  Hx of Short term rehab: just completed stay at [facility] April 2023  Mental Health Hx: none  Substance Abuse Hx: none  Employment: retired  [place] Status/Location:  Ability to pay bills: reports no concerns  POA/LW/AD: Wife  Transportation Plan/Concerns: wife and neighbors will assist      What do you know about your Cancer Diagnosis    What has your doctor told you about your cancer diagnosis:    What has your doctor told you about your cancer treatment:    What specific concerns do you have about your diagnosis and treatment:    Have you been made aware of any hair loss associated with treatment:    Additional Comments:      MSW s/w pt by phone this afternoon to introduce OSW as a resource and assess for any needs  Pt was scheduled to start treatment but tells me that he had a tx change and will now start this week instead  He is doing outpt PT at this time after a rehab stay at [facility] and tells me that he is doing well overall with his care and ADLs  He does acknowledge one fall recently but says that it was on wet grass and not any issue with his balance or coordination    He denies any injury with this fall  Pt shared with me that he has a strong network of support between family and friends, and says that his neighborhood is very close knit  He shared that his neighbor drove to him to PT today and how he appreciates the community that he lives in  He lives at home with his wife and is excited to see his daughter and grandson next month, they are flying in from [place] for his birthday  His wife will be with him for his first treatment later this week  At this time pt denies any needs or concerns, but was appreciative to know that he has a SW available to him  He agreed that he will reach out as needed moving forward  MSW will remain available to assist or support him as needed in the future

## 2023-05-16 ENCOUNTER — OFFICE VISIT (OUTPATIENT)
Dept: HEMATOLOGY ONCOLOGY | Facility: CLINIC | Age: 68
End: 2023-05-16

## 2023-05-16 ENCOUNTER — HOSPITAL ENCOUNTER (OUTPATIENT)
Dept: INFUSION CENTER | Facility: HOSPITAL | Age: 68
Discharge: HOME | End: 2023-05-16

## 2023-05-16 VITALS
BODY MASS INDEX: 27.39 KG/M2 | HEART RATE: 81 BPM | OXYGEN SATURATION: 94 % | HEIGHT: 69 IN | DIASTOLIC BLOOD PRESSURE: 64 MMHG | TEMPERATURE: 98.1 F | RESPIRATION RATE: 18 BRPM | SYSTOLIC BLOOD PRESSURE: 140 MMHG | WEIGHT: 184.9 LBS

## 2023-05-16 DIAGNOSIS — T45.1X5A CHEMOTHERAPY-INDUCED NEUTROPENIA (HCC): ICD-10-CM

## 2023-05-16 DIAGNOSIS — C79.9 METASTATIC ADENOCARCINOMA (HCC): Primary | ICD-10-CM

## 2023-05-16 DIAGNOSIS — C34.91 CARCINOMA OF RIGHT LUNG (HCC): ICD-10-CM

## 2023-05-16 DIAGNOSIS — C34.91 CARCINOMA OF RIGHT LUNG (HCC): Primary | ICD-10-CM

## 2023-05-16 DIAGNOSIS — E03.2 UNDERACTIVE THYROID DUE DRUGS: ICD-10-CM

## 2023-05-16 DIAGNOSIS — C79.31 METASTASIS TO BRAIN (HCC): ICD-10-CM

## 2023-05-16 DIAGNOSIS — Z45.2 ENCOUNTER FOR CENTRAL LINE CARE: ICD-10-CM

## 2023-05-16 DIAGNOSIS — D70.1 CHEMOTHERAPY-INDUCED NEUTROPENIA (HCC): ICD-10-CM

## 2023-05-16 LAB
ALBUMIN SERPL BCP-MCNC: 3.8 G/DL (ref 3.5–5)
ALP SERPL-CCNC: 76 U/L (ref 34–104)
ALT SERPL W P-5'-P-CCNC: 9 U/L (ref 7–52)
AMYLASE SERPL-CCNC: 30 IU/L (ref 29–103)
ANION GAP SERPL CALCULATED.3IONS-SCNC: 8 MMOL/L (ref 4–13)
AST SERPL W P-5'-P-CCNC: 15 U/L (ref 13–39)
BASOPHILS # BLD AUTO: 0.07 THOUSANDS/ÂΜL (ref 0–0.1)
BASOPHILS NFR BLD AUTO: 1 % (ref 0–1)
BILIRUB SERPL-MCNC: 0.5 MG/DL (ref 0.2–1)
BUN SERPL-MCNC: 11 MG/DL (ref 5–25)
CALCIUM SERPL-MCNC: 8.6 MG/DL (ref 8.4–10.2)
CHLORIDE SERPL-SCNC: 104 MMOL/L (ref 96–108)
CO2 SERPL-SCNC: 26 MMOL/L (ref 21–32)
CREAT SERPL-MCNC: 0.74 MG/DL (ref 0.6–1.3)
EOSINOPHIL # BLD AUTO: 0.25 THOUSAND/ÂΜL (ref 0–0.61)
EOSINOPHIL NFR BLD AUTO: 3 % (ref 0–6)
ERYTHROCYTE [DISTWIDTH] IN BLOOD BY AUTOMATED COUNT: 13 % (ref 11.6–15.1)
GFR SERPL CREATININE-BSD FRML MDRD: 94 ML/MIN/1.73SQ M
GLUCOSE SERPL-MCNC: 136 MG/DL (ref 65–140)
HCT VFR BLD AUTO: 38.2 % (ref 36.5–49.3)
HGB BLD-MCNC: 11.9 G/DL (ref 12–17)
IMM GRANULOCYTES # BLD AUTO: 0.05 THOUSAND/UL (ref 0–0.2)
IMM GRANULOCYTES NFR BLD AUTO: 1 % (ref 0–2)
LDH SERPL-CCNC: 176 U/L (ref 140–271)
LIPASE SERPL-CCNC: 34 U/L (ref 11–82)
LYMPHOCYTES # BLD AUTO: 1.49 THOUSANDS/ÂΜL (ref 0.6–4.47)
LYMPHOCYTES NFR BLD AUTO: 19 % (ref 14–44)
MCH RBC QN AUTO: 28.5 PG (ref 26.8–34.3)
MCHC RBC AUTO-ENTMCNC: 31.2 G/DL (ref 31.4–37.4)
MCV RBC AUTO: 91 FL (ref 82–98)
MONOCYTES # BLD AUTO: 0.69 THOUSAND/ÂΜL (ref 0.17–1.22)
MONOCYTES NFR BLD AUTO: 9 % (ref 4–12)
NEUTROPHILS # BLD AUTO: 5.36 THOUSANDS/ÂΜL (ref 1.85–7.62)
NEUTS SEG NFR BLD AUTO: 67 % (ref 43–75)
NRBC BLD AUTO-RTO: 0 /100 WBCS
PLATELET # BLD AUTO: 305 THOUSANDS/UL (ref 149–390)
PMV BLD AUTO: 9.5 FL (ref 8.9–12.7)
POTASSIUM SERPL-SCNC: 3.8 MMOL/L (ref 3.5–5.3)
PROT SERPL-MCNC: 6.6 G/DL (ref 6.4–8.4)
RBC # BLD AUTO: 4.18 MILLION/UL (ref 3.88–5.62)
SODIUM SERPL-SCNC: 138 MMOL/L (ref 135–147)
T3FREE SERPL-MCNC: 3.61 PG/ML (ref 2.5–3.9)
TSH SERPL DL<=0.05 MIU/L-ACNC: 1.53 UIU/ML (ref 0.45–4.5)
WBC # BLD AUTO: 7.91 THOUSAND/UL (ref 4.31–10.16)

## 2023-05-16 NOTE — PROGRESS NOTES
Pt presents for central labs offering no complaints  Labs obtained via [facility] port without difficulty  Port flushed per protocol  Next appt reviewed with pt  Pt discharged in stable condition

## 2023-05-16 NOTE — PROGRESS NOTES
Hematology/Oncology Outpatient Follow-up  Enrico Rodriguez 69 y o  male 1955 [id number]    Date:  5/16/2023        Assessment and Plan:  1  Carcinoma of right lung [facility])  We had a lengthy discussion with the patient and his wife regarding the new recommendation after recent PET CT scan  He seems to have no evidence of distant metastasis besides the oligometastatic brain lesion which was entirely resected  He also had the brain radiation at the site of the resected lesion  His estimated clinical stage at this point is T3N1 involving the right upper lobe  The patient was told that he might be a surgical candidate with robotic right upper lobectomy and lymph node dissection if we see a response after induction neoadjuvant chemotherapy/immunotherapy  The patient was educated extensively about the potential side effects from carboplatin/Alimta and nivolumab which will be given on every 3-week basis for 3 cycles followed by a PET CT scan to evaluate the status of his malignancy after neoadjuvant approach  The patient then will be seen by the thoracic surgical team to see if he is still a surgical candidate versus pursuing concurrent chemoradiation  The patient and the wife had multiple questions regarding the potential side effects of the treatment  He will be on carboplatin based regimen instead of cisplatin since he does have comorbidities including pre-existing hearing loss  He will be starting with cycle 1 on 5/18/2023     - CBC and differential; Future  - Comprehensive metabolic panel; Future  - Magnesium; Future  - LD,Blood; Future  - TSH, 3rd generation with Free T4 reflex; Future    2  Metastasis to brain [facility])  He is status post surgical resection of the oligometastatic Brain lesion followed by postoperative RT with SRS/SRT in 5 fractions to avoid local recurrence  He will be getting imaging, most likely with MRI on every 3 months basis to monitor for recurrence      3  Chemotherapy-induced neutropenia [facility])  He did not get approved for pegylated G-CSF  We will monitor him closely for any neutropenic complications  4  Underactive thyroid due drugs  We will monitor his thyroid function while he is on immunotherapy   - TSH, 3rd generation with Free T4 reflex; Future        HPI:  The patient came today for follow-up visit accompanied by his wife  He did have a PET CT scan done on 4/20/2023 which showed:  IMPRESSION:     1  FDG avid right upper lobe mass in keeping with primary bronchogenic carcinoma of the lung  2  FDG avid multifocal right hilar adenopathy  3  Trace nonspecific activity within a right paratracheal lymph node with short axis diameter 0 9 cm   4  No evidence of FDG avid metastatic disease within the neck, abdomen, pelvis, or skeleton  He was then seen by the radiation oncology team and thoracic surgical team   His case was discussed at the multidisciplinary thoracic tumor conference  He was told that he might be a surgical candidate for robotic right upper lobectomy and lymph node dissection after neoadjuvant treatment with chemotherapy and immunotherapy for his clinical T3N1 right upper lobe lung adenocarcinoma  He did receive 5 5 sessions of radiation to the resected metastatic brain lesion area  Recent blood work from this morning Showed hemoglobin of 11 9 with normal white cells and platelets  Creatinine was 0 7 with normal calcium and liver enzymes  Amylase lipase and LDH are within normal range  The rest of the blood work is pending  Oncology History Overview Note   This is a 49-year-old male with history of prostate cancer status post prostatectomy in 2001  History of stroke in 2011, hypertension, etc   The patient presented to the hospital on 3/19/2023 with 1 week of progressive left-sided weakness  CTA of the brain showed right frontal lobe cystic lesion with surrounding vasogenic edema consistent with metastatic disease    The patient then had CT scan of the chest abdomen pelvis on 3/20/2023 which showed:  IMPRESSION:     3 8 x 3 5 cm right upper lobe lung mass with associated overlying pleural tag and adjacent to pleural thickening, this may be due to pleural reaction or mild pleural invasion     No contralateral lung nodules or mass     Right hilar lymphadenopathy  Small lower right paratracheal left paratracheal lymph node do not meet the criteria for pathologic enlargement on the bases of size or morphology     There is no CT evidence of for metastatic disease in the abdomen and pelvis     No lytic destructive lesion in the visualized bony skeleton  The study was marked in EPIC for significant notification  Bone scan was negative  MRI of the brain on 3/21/2023 showed:  IMPRESSION:     Unchanged 2 5 cm right parasagittal frontal lobe vertex mass with central necrosis and marked perilesional vasogenic edema unchanged  Favor metastatic disease (given lung mass) over primary high-grade glioma  Unchanged small subacute infarct in right frontal lobe  Unchanged 0 2 cm leftward midline shift  No new acute intracranial abnormality  There are left-sided weakness improved with dexamethasone  The patient then was taken to the OR on 3/23/2023 and had right frontal craniotomy and resection of the right parasagittal frontal lobe mass  The pathology was compatible with metastatic non-small cell cancer  The molecular evaluation through [facility] showed PD-L1 of 100% with high TMB of 10  No oncogenic  mutation was found  Metastatic adenocarcinoma ([address] )   2023 Initial Diagnosis    Metastatic adenocarcinoma ([facility] [address] )     3/23/2023 Biopsy    Brain, right frontal mass (biopsy):  - Metastatic non-small cell carcinoma     Comment:  - Tumor cells stain diffusely for CAM5 2, CKAE1/3, CK7, TTF1 and NapsinA with absent CK20, p63, CK5/6, p40 expression  This immunopanel favors a metastatic lung adenocarcinoma         5/18/2023 -  Chemotherapy    cyanocobalamin, 1,000 mcg, Intramuscular, Once, 0 of 4 cycles  pegfilgrastim (Darwyn Paige), 6 mg, Subcutaneous, Once, 0 of 2 cycles  fosaprepitant (EMEND) IVPB, 150 mg, Intravenous, Once, 0 of 6 cycles  nivolumab (OPDIVO) IVPB, 360 mg (150 % of original dose 240 mg), Intravenous, Once, 0 of 3 cycles  Dose modification: 360 mg (original dose 240 mg, Cycle 1, Reason: Dose modified as per discussion with consulting physician)  CARBOplatin (PARAPLATIN) IVPB (GOG AUC DOSING), 642 5 mg, Intravenous, Once, 0 of 6 cycles  pemetrexed (ALIMTA) chemo infusion, 500 mg/m2 = 980 mg, Intravenous, Once, 0 of 6 cycles     Carcinoma of right lung ([facility] [address] )   4/13/2023 Initial Diagnosis    Carcinoma of right lung ([facility] [address] )     4/13/2023 -  Cancer Staged    Staging form: Lung, AJCC 8th Edition  - Clinical: Stage JAY (cT3, cN1, cM1b) - Signed by Ally Dee MD on 4/13/2023 5/18/2023 -  Chemotherapy    cyanocobalamin, 1,000 mcg, Intramuscular, Once, 0 of 4 cycles  pegfilgrastim (NEULASTA ONPRO), 6 mg, Subcutaneous, Once, 0 of 2 cycles  fosaprepitant (EMEND) IVPB, 150 mg, Intravenous, Once, 0 of 6 cycles  nivolumab (OPDIVO) IVPB, 360 mg (150 % of original dose 240 mg), Intravenous, Once, 0 of 3 cycles  Dose modification: 360 mg (original dose 240 mg, Cycle 1, Reason: Dose modified as per discussion with consulting physician)  CARBOplatin (PARAPLATIN) IVPB (GOG AUC DOSING), 642 5 mg, Intravenous, Once, 0 of 6 cycles  pemetrexed (ALIMTA) chemo infusion, 500 mg/m2 = 980 mg, Intravenous, Once, 0 of 6 cycles         Interval history:    ROS: Review of Systems   Constitutional: Negative for chills and fever  HENT: Negative for ear pain and sore throat  Eyes: Negative for pain and visual disturbance  Respiratory: Negative for cough and shortness of breath  Cardiovascular: Negative for chest pain and palpitations  Gastrointestinal: Negative for abdominal pain and vomiting  Genitourinary: Negative for dysuria and hematuria     Musculoskeletal: Negative for arthralgias and back pain  Skin: Negative for color change and rash  Neurological: Positive for numbness  Negative for seizures and syncope  All other systems reviewed and are negative  Past Medical History:   Diagnosis Date   • Cancer ([facility] [address] )     Receint lung and brain lesions and prostate cancer in    • Hypertension    • Prostate cancer ([facility] [address] )    • Rectal bleeding    • Stroke [facility])              Past Surgical History:   Procedure Laterality Date   • COLONOSCOPY     • CRANIOTOMY Right 3/23/2023    Procedure: Right frontal CRANIOTOMY IMAGE-GUIDED FOR TUMOR;  Surgeon: Sera Morales MD;  Location: [facility];  Service: Neurosurgery   • IR PORT PLACEMENT  2023   • AR CYSTOURETHROSCOPY N/A 3/23/2023    Procedure: EUA, DEL CASTILLO INSERTION;  Surgeon: Khari Gan MD;  Location: [facility];  Service: Urology   • PROSTATECTOMY     • TONSILLECTOMY         Social History     Socioeconomic History   • Marital status: /Civil Union     Spouse name: None   • Number of children: None   • Years of education: None   • Highest education level: None   Occupational History   • None   Tobacco Use   • Smoking status: Former     Packs/day: 1 00     Years: 15 00     Pack years: 15 00     Types: Cigarettes     Quit date:      Years since quittin 3     Passive exposure: Never   • Smokeless tobacco: Never   • Tobacco comments:     i quit when i was 28  not sure of exact dates   Vaping Use   • Vaping Use: Never used   Substance and Sexual Activity   • Alcohol use:  Yes     Alcohol/week: 6 0 standard drinks     Types: 6 Cans of beer per week     Comment: socially   • Drug use: Not Currently     Types: Marijuana   • Sexual activity: Yes     Partners: Female     Birth control/protection: None   Other Topics Concern   • None   Social History Narrative   • None     Social Determinants of Health     Financial Resource Strain: Not on file   Food Insecurity: No Food Insecurity   • Worried About "Running Out of Food in the Last Year: Never true   • Ran Out of Food in the Last Year: Never true   Transportation Needs: No Transportation Needs   • Lack of Transportation (Medical): No   • Lack of Transportation (Non-Medical): No   Physical Activity: Not on file   Stress: Not on file   Social Connections: Not on file   Intimate Partner Violence: Not on file   Housing Stability: Low Risk    • Unable to Pay for Housing in the Last Year: No   • Number of Places Lived in the Last Year: 1   • Unstable Housing in the Last Year: No       Family History   Problem Relation Age of Onset   • Dementia Mother            • Breast cancer Sister            • Prostate cancer Brother         Received Radiation       No Known Allergies      Current Outpatient Medications:   •  acetaminophen (TYLENOL) 325 mg tablet, Take 3 tablets (975 mg total) by mouth every 8 (eight) hours as needed for mild pain or headaches, Disp: 30 tablet, Rfl: 0  •  amLODIPine (NORVASC) 10 mg tablet, Take 1 tablet (10 mg total) by mouth daily, Disp: 90 tablet, Rfl: 3  •  atorvastatin (LIPITOR) 40 mg tablet, Take 1 tablet (40 mg total) by mouth daily after dinner, Disp: 30 tablet, Rfl: 0  •  folic acid (KP Folic Acid) 1 mg tablet, Take 1 tablet (1 mg total) by mouth daily, Disp: 30 tablet, Rfl: 11  •  losartan (COZAAR) 50 mg tablet, Take 1 tablet (50 mg total) by mouth daily, Disp: 90 tablet, Rfl: 3  •  ondansetron (ZOFRAN) 8 mg tablet, Take 1 tablet (8 mg total) by mouth every 8 (eight) hours as needed for nausea or vomiting (Patient not taking: Reported on 2023), Disp: 20 tablet, Rfl: 3  No current facility-administered medications for this visit      Facility-Administered Medications Ordered in Other Visits:   •  alteplase (CATHFLO) injection 2 mg, 2 mg, Intracatheter, Q1MIN PRN, Zofia Benson MD      Physical Exam:  /64   Pulse 81   Temp 98 1 °F (36 7 °C)   Resp 18   Ht 5' 9\" (1 753 m)   Wt 83 9 kg (184 lb 14 4 oz)   SpO2 94% " BMI 27 30 kg/m²     Physical Exam  Constitutional:       Appearance: He is well-developed  HENT:      Head: Normocephalic and atraumatic  Nose: Nose normal    Eyes:      General: No scleral icterus  Right eye: No discharge  Left eye: No discharge  Conjunctiva/sclera: Conjunctivae normal       Pupils: Pupils are equal, round, and reactive to light  Neck:      Thyroid: No thyromegaly  Trachea: No tracheal deviation  Cardiovascular:      Rate and Rhythm: Normal rate and regular rhythm  Heart sounds: Normal heart sounds  No murmur heard  No friction rub  Pulmonary:      Effort: Pulmonary effort is normal  No respiratory distress  Breath sounds: Normal breath sounds  No wheezing or rales  Chest:      Chest wall: No tenderness  Abdominal:      General: There is no distension  Palpations: Abdomen is soft  There is no hepatomegaly or splenomegaly  Tenderness: There is no abdominal tenderness  There is no guarding or rebound  Musculoskeletal:         General: No tenderness or deformity  Normal range of motion  Cervical back: Normal range of motion and neck supple  Lymphadenopathy:      Cervical: No cervical adenopathy  Skin:     General: Skin is warm and dry  Coloration: Skin is not pale  Findings: No erythema or rash  Neurological:      Mental Status: He is alert and oriented to person, place, and time  Cranial Nerves: No cranial nerve deficit  Coordination: Coordination normal       Deep Tendon Reflexes: Reflexes are normal and symmetric  Psychiatric:         Behavior: Behavior normal          Thought Content:  Thought content normal          Judgment: Judgment normal            Labs:  Lab Results   Component Value Date    WBC 7 91 05/16/2023    HGB 11 9 (L) 05/16/2023    HCT 38 2 05/16/2023    MCV 91 05/16/2023     05/16/2023     Lab Results   Component Value Date     05/10/2018    K 3 6 05/01/2023     05/01/2023    CO2 27 05/01/2023    ANIONGAP 11 6 05/10/2018    BUN 13 05/01/2023    CREATININE 0 71 05/01/2023    GLUF 130 (H) 04/10/2023    CALCIUM 8 7 05/01/2023    AST 36 05/01/2023    ALT 38 05/01/2023    ALKPHOS 81 05/01/2023    PROT 7 2 05/10/2018    BILITOT 0 8 05/10/2018    EGFR 95 05/01/2023     No results found for: TSH    Patient voiced understanding and agreement in the above discussion  Aware to contact our office with questions/symptoms in the interim

## 2023-05-17 ENCOUNTER — TELEPHONE (OUTPATIENT)
Dept: HEMATOLOGY ONCOLOGY | Facility: CLINIC | Age: 68
End: 2023-05-17

## 2023-05-17 NOTE — TELEPHONE ENCOUNTER
Called patient and left a message letting him know when his next f/u appt with Dr Gela Khanna will be and to call back if date and time is not good for him

## 2023-05-18 ENCOUNTER — HOSPITAL ENCOUNTER (OUTPATIENT)
Dept: INFUSION CENTER | Facility: HOSPITAL | Age: 68
End: 2023-05-18
Attending: INTERNAL MEDICINE

## 2023-05-18 VITALS
SYSTOLIC BLOOD PRESSURE: 149 MMHG | DIASTOLIC BLOOD PRESSURE: 88 MMHG | OXYGEN SATURATION: 98 % | BODY MASS INDEX: 27.43 KG/M2 | RESPIRATION RATE: 18 BRPM | WEIGHT: 185.19 LBS | TEMPERATURE: 97.4 F | HEIGHT: 69 IN | HEART RATE: 85 BPM

## 2023-05-18 DIAGNOSIS — C79.9 METASTATIC ADENOCARCINOMA (HCC): Primary | ICD-10-CM

## 2023-05-18 DIAGNOSIS — C34.91 CARCINOMA OF RIGHT LUNG (HCC): ICD-10-CM

## 2023-05-18 DIAGNOSIS — D70.1 CHEMOTHERAPY-INDUCED NEUTROPENIA (HCC): ICD-10-CM

## 2023-05-18 DIAGNOSIS — T45.1X5A CHEMOTHERAPY-INDUCED NEUTROPENIA (HCC): ICD-10-CM

## 2023-05-18 RX ORDER — SODIUM CHLORIDE 9 MG/ML
20 INJECTION, SOLUTION INTRAVENOUS ONCE
Status: COMPLETED | OUTPATIENT
Start: 2023-05-18 | End: 2023-05-18

## 2023-05-18 RX ORDER — CYANOCOBALAMIN 1000 UG/ML
1000 INJECTION, SOLUTION INTRAMUSCULAR; SUBCUTANEOUS ONCE
Status: COMPLETED | OUTPATIENT
Start: 2023-05-18 | End: 2023-05-18

## 2023-05-18 RX ADMIN — CYANOCOBALAMIN 1000 MCG: 1000 INJECTION, SOLUTION INTRAMUSCULAR at 10:41

## 2023-05-18 RX ADMIN — FOSAPREPITANT 150 MG: 150 INJECTION, POWDER, LYOPHILIZED, FOR SOLUTION INTRAVENOUS at 09:22

## 2023-05-18 RX ADMIN — SODIUM CHLORIDE 20 ML/HR: 0.9 INJECTION, SOLUTION INTRAVENOUS at 08:58

## 2023-05-18 RX ADMIN — SODIUM CHLORIDE 360 MG: 9 INJECTION, SOLUTION INTRAVENOUS at 10:01

## 2023-05-18 RX ADMIN — SODIUM CHLORIDE 1000 MG: 9 INJECTION, SOLUTION INTRAVENOUS at 10:47

## 2023-05-18 RX ADMIN — CARBOPLATIN 642.5 MG: 10 INJECTION, SOLUTION INTRAVENOUS at 11:01

## 2023-05-18 RX ADMIN — DEXAMETHASONE SODIUM PHOSPHATE: 10 INJECTION, SOLUTION INTRAMUSCULAR; INTRAVENOUS at 08:59

## 2023-05-18 NOTE — PROGRESS NOTES
Pt confirmed taking folic acid at home x1 week and has zofran pills PRN  Tolerated chemotherapy infusion well without incident  Pt and wife both educated on signs of emergencies and when to report to the ED - both verbalized understanding  Pt discharged in stable condition and is aware of next infusion appointment  AVS provided

## 2023-05-22 ENCOUNTER — TELEPHONE (OUTPATIENT)
Dept: NEUROLOGY | Facility: CLINIC | Age: 68
End: 2023-05-22

## 2023-05-22 NOTE — TELEPHONE ENCOUNTER
Patient is scheduled for a HFU on 6/22/23  Called patient to offer a sooner HFU within the recommended follow up timeframe on 5/24/23 with Kobi Le PA-C  No answer  Left a voice message requesting for the patient to return the call  Provided the office's phone number

## 2023-05-26 ENCOUNTER — APPOINTMENT (OUTPATIENT)
Dept: PHYSICAL THERAPY | Facility: CLINIC | Age: 68
End: 2023-05-26
Payer: COMMERCIAL

## 2023-06-05 DIAGNOSIS — C79.9 METASTATIC ADENOCARCINOMA (HCC): Primary | ICD-10-CM

## 2023-06-05 DIAGNOSIS — C34.91 CARCINOMA OF RIGHT LUNG (HCC): ICD-10-CM

## 2023-06-05 DIAGNOSIS — D70.1 CHEMOTHERAPY-INDUCED NEUTROPENIA (HCC): ICD-10-CM

## 2023-06-05 DIAGNOSIS — T45.1X5A CHEMOTHERAPY-INDUCED NEUTROPENIA (HCC): ICD-10-CM

## 2023-06-05 RX ORDER — SODIUM CHLORIDE 9 MG/ML
20 INJECTION, SOLUTION INTRAVENOUS ONCE
Status: CANCELLED | OUTPATIENT
Start: 2023-06-08

## 2023-06-06 ENCOUNTER — OFFICE VISIT (OUTPATIENT)
Dept: HEMATOLOGY ONCOLOGY | Facility: CLINIC | Age: 68
End: 2023-06-06
Payer: COMMERCIAL

## 2023-06-06 ENCOUNTER — HOSPITAL ENCOUNTER (OUTPATIENT)
Dept: INFUSION CENTER | Facility: HOSPITAL | Age: 68
Discharge: HOME | End: 2023-06-06
Payer: COMMERCIAL

## 2023-06-06 VITALS
HEART RATE: 82 BPM | BODY MASS INDEX: 27.49 KG/M2 | RESPIRATION RATE: 18 BRPM | DIASTOLIC BLOOD PRESSURE: 85 MMHG | OXYGEN SATURATION: 99 % | HEIGHT: 69 IN | TEMPERATURE: 97.6 F | WEIGHT: 185.63 LBS | SYSTOLIC BLOOD PRESSURE: 149 MMHG

## 2023-06-06 VITALS
HEART RATE: 82 BPM | OXYGEN SATURATION: 96 % | WEIGHT: 187 LBS | HEIGHT: 69 IN | RESPIRATION RATE: 18 BRPM | DIASTOLIC BLOOD PRESSURE: 80 MMHG | SYSTOLIC BLOOD PRESSURE: 132 MMHG | BODY MASS INDEX: 27.7 KG/M2 | TEMPERATURE: 97.5 F

## 2023-06-06 DIAGNOSIS — T45.1X5A CHEMOTHERAPY-INDUCED NEUTROPENIA (HCC): ICD-10-CM

## 2023-06-06 DIAGNOSIS — C34.91 CARCINOMA OF RIGHT LUNG (HCC): ICD-10-CM

## 2023-06-06 DIAGNOSIS — E03.2 UNDERACTIVE THYROID DUE DRUGS: ICD-10-CM

## 2023-06-06 DIAGNOSIS — C79.31 METASTASIS TO BRAIN (HCC): ICD-10-CM

## 2023-06-06 DIAGNOSIS — Z45.2 ENCOUNTER FOR CENTRAL LINE CARE: ICD-10-CM

## 2023-06-06 DIAGNOSIS — C34.91 CARCINOMA OF RIGHT LUNG (HCC): Primary | ICD-10-CM

## 2023-06-06 DIAGNOSIS — M79.89 LEFT ARM SWELLING: ICD-10-CM

## 2023-06-06 DIAGNOSIS — D70.1 CHEMOTHERAPY-INDUCED NEUTROPENIA (HCC): ICD-10-CM

## 2023-06-06 DIAGNOSIS — C79.9 METASTATIC ADENOCARCINOMA (HCC): Primary | ICD-10-CM

## 2023-06-06 DIAGNOSIS — M79.89 LEG SWELLING: ICD-10-CM

## 2023-06-06 LAB
ALBUMIN SERPL BCP-MCNC: 3.9 G/DL (ref 3.5–5)
ALP SERPL-CCNC: 69 U/L (ref 34–104)
ALT SERPL W P-5'-P-CCNC: 10 U/L (ref 7–52)
AMYLASE SERPL-CCNC: 26 IU/L (ref 29–103)
ANION GAP SERPL CALCULATED.3IONS-SCNC: 8 MMOL/L (ref 4–13)
AST SERPL W P-5'-P-CCNC: 15 U/L (ref 13–39)
BASOPHILS # BLD AUTO: 0.03 THOUSANDS/ÂΜL (ref 0–0.1)
BASOPHILS NFR BLD AUTO: 1 % (ref 0–1)
BILIRUB SERPL-MCNC: 0.56 MG/DL (ref 0.2–1)
BUN SERPL-MCNC: 12 MG/DL (ref 5–25)
CALCIUM SERPL-MCNC: 8.7 MG/DL (ref 8.4–10.2)
CHLORIDE SERPL-SCNC: 105 MMOL/L (ref 96–108)
CO2 SERPL-SCNC: 26 MMOL/L (ref 21–32)
CREAT SERPL-MCNC: 0.65 MG/DL (ref 0.6–1.3)
EOSINOPHIL # BLD AUTO: 0.17 THOUSAND/ÂΜL (ref 0–0.61)
EOSINOPHIL NFR BLD AUTO: 4 % (ref 0–6)
ERYTHROCYTE [DISTWIDTH] IN BLOOD BY AUTOMATED COUNT: 14.2 % (ref 11.6–15.1)
GFR SERPL CREATININE-BSD FRML MDRD: 99 ML/MIN/1.73SQ M
GLUCOSE SERPL-MCNC: 166 MG/DL (ref 65–140)
HCT VFR BLD AUTO: 35.4 % (ref 36.5–49.3)
HGB BLD-MCNC: 11.2 G/DL (ref 12–17)
IMM GRANULOCYTES # BLD AUTO: 0.03 THOUSAND/UL (ref 0–0.2)
IMM GRANULOCYTES NFR BLD AUTO: 1 % (ref 0–2)
LDH SERPL-CCNC: 161 U/L (ref 140–271)
LIPASE SERPL-CCNC: 28 U/L (ref 11–82)
LYMPHOCYTES # BLD AUTO: 1.23 THOUSANDS/ÂΜL (ref 0.6–4.47)
LYMPHOCYTES NFR BLD AUTO: 28 % (ref 14–44)
MCH RBC QN AUTO: 28.8 PG (ref 26.8–34.3)
MCHC RBC AUTO-ENTMCNC: 31.6 G/DL (ref 31.4–37.4)
MCV RBC AUTO: 91 FL (ref 82–98)
MONOCYTES # BLD AUTO: 0.5 THOUSAND/ÂΜL (ref 0.17–1.22)
MONOCYTES NFR BLD AUTO: 12 % (ref 4–12)
NEUTROPHILS # BLD AUTO: 2.4 THOUSANDS/ÂΜL (ref 1.85–7.62)
NEUTS SEG NFR BLD AUTO: 54 % (ref 43–75)
NRBC BLD AUTO-RTO: 0 /100 WBCS
PLATELET # BLD AUTO: 278 THOUSANDS/UL (ref 149–390)
PMV BLD AUTO: 9.3 FL (ref 8.9–12.7)
POTASSIUM SERPL-SCNC: 3.9 MMOL/L (ref 3.5–5.3)
PROT SERPL-MCNC: 6.8 G/DL (ref 6.4–8.4)
RBC # BLD AUTO: 3.89 MILLION/UL (ref 3.88–5.62)
SODIUM SERPL-SCNC: 139 MMOL/L (ref 135–147)
T3FREE SERPL-MCNC: 2.94 PG/ML (ref 2.5–3.9)
TSH SERPL DL<=0.05 MIU/L-ACNC: 2.34 UIU/ML (ref 0.45–4.5)
WBC # BLD AUTO: 4.36 THOUSAND/UL (ref 4.31–10.16)

## 2023-06-06 PROCEDURE — 83690 ASSAY OF LIPASE: CPT | Performed by: INTERNAL MEDICINE

## 2023-06-06 PROCEDURE — 80053 COMPREHEN METABOLIC PANEL: CPT | Performed by: INTERNAL MEDICINE

## 2023-06-06 PROCEDURE — 84443 ASSAY THYROID STIM HORMONE: CPT | Performed by: INTERNAL MEDICINE

## 2023-06-06 PROCEDURE — 84481 FREE ASSAY (FT-3): CPT | Performed by: INTERNAL MEDICINE

## 2023-06-06 PROCEDURE — 99214 OFFICE O/P EST MOD 30 MIN: CPT | Performed by: INTERNAL MEDICINE

## 2023-06-06 PROCEDURE — 85025 COMPLETE CBC W/AUTO DIFF WBC: CPT | Performed by: INTERNAL MEDICINE

## 2023-06-06 PROCEDURE — 82150 ASSAY OF AMYLASE: CPT | Performed by: INTERNAL MEDICINE

## 2023-06-06 PROCEDURE — 83615 LACTATE (LD) (LDH) ENZYME: CPT | Performed by: INTERNAL MEDICINE

## 2023-06-06 NOTE — PROGRESS NOTES
Hematology/Oncology Outpatient Follow-up  Manas Beckford 69 y o  male 1955 [id number]    Date:  6/6/2023        Assessment and Plan:  1  Carcinoma of right lung [facility])  The patient will be due for cycle 2 of neoadjuvant chemotherapy/immunotherapy with carboplatin/Alimta and nivolumab pending his blood work from this morning  We did discuss the side effects he had post first cycle  We will aim for a total of 3 cycles of neoadjuvant treatment followed by a PET scan to evaluate the status of his malignancy to see if he would be a surgical candidate versus concurrent chemoradiation     - CBC and differential; Future  - Comprehensive metabolic panel; Future  - Magnesium; Future  - TSH, 3rd generation with Free T4 reflex; Future    2  Metastasis to brain [facility])  He is status post surgical resection of the oligometastatic Brain lesion followed by postoperative RT with SRS/SRT in 5 fractions to avoid local recurrence  He will be getting imaging, most likely with MRI on every 3 months basis to monitor for recurrence  3  Chemotherapy-induced neutropenia (HCC)  We will continue to monitor without the pegylated G-CSF as long as his white cell count is high enough for subsequent treatment  4  Underactive thyroid due drugs  We will continue to monitor while on immunotherapy   - TSH, 3rd generation with Free T4 reflex; Future    5  Leg swelling  Doppler ultrasound will be done to rule out any obvious hint of DVT  - VAS lower limb venous duplex study, complete bilateral; Future    6  Left arm swelling  Doppler ultrasound to rule out DVT  - VAS upper limb venous duplex scan, unilateral/limited; Future        HPI:  The patient came today for follow-up visit accompanied by his wife  He did have 1 cycle of combined chemotherapy and immunotherapy which he tolerated with some side effects including hiccups and swelling of the lower extremities bilaterally  He did also notice swelling of the left arm as well    Blood work from this morning is still pending  Oncology History Overview Note   This is a 20-year-old male with history of prostate cancer status post prostatectomy in 2001  History of stroke in 2011, hypertension, etc   The patient presented to the hospital on 3/19/2023 with 1 week of progressive left-sided weakness  CTA of the brain showed right frontal lobe cystic lesion with surrounding vasogenic edema consistent with metastatic disease  The patient then had CT scan of the chest abdomen pelvis on 3/20/2023 which showed:  IMPRESSION:     3 8 x 3 5 cm right upper lobe lung mass with associated overlying pleural tag and adjacent to pleural thickening, this may be due to pleural reaction or mild pleural invasion     No contralateral lung nodules or mass     Right hilar lymphadenopathy  Small lower right paratracheal left paratracheal lymph node do not meet the criteria for pathologic enlargement on the bases of size or morphology     There is no CT evidence of for metastatic disease in the abdomen and pelvis     No lytic destructive lesion in the visualized bony skeleton  The study was marked in EPIC for significant notification  Bone scan was negative  MRI of the brain on 3/21/2023 showed:  IMPRESSION:     Unchanged 2 5 cm right parasagittal frontal lobe vertex mass with central necrosis and marked perilesional vasogenic edema unchanged  Favor metastatic disease (given lung mass) over primary high-grade glioma  Unchanged small subacute infarct in right frontal lobe  Unchanged 0 2 cm leftward midline shift  No new acute intracranial abnormality  There are left-sided weakness improved with dexamethasone  The patient then was taken to the OR on 3/23/2023 and had right frontal craniotomy and resection of the right parasagittal frontal lobe mass  The pathology was compatible with metastatic non-small cell cancer  The molecular evaluation through [facility] showed PD-L1 of 100% with high TMB of 10    No oncogenic  mutation was found  Metastatic adenocarcinoma ([address] )   2023 Initial Diagnosis    Metastatic adenocarcinoma ([facility] [address] )     3/23/2023 Biopsy    Brain, right frontal mass (biopsy):  - Metastatic non-small cell carcinoma     Comment:  - Tumor cells stain diffusely for CAM5 2, CKAE1/3, CK7, TTF1 and NapsinA with absent CK20, p63, CK5/6, p40 expression  This immunopanel favors a metastatic lung adenocarcinoma         5/18/2023 -  Chemotherapy    cyanocobalamin, 1,000 mcg, Intramuscular, Once, 1 of 4 cycles  Administration: 1,000 mcg (5/18/2023)  alteplase (CATHFLO), 2 mg, Intracatheter, Every 1 Minute as needed, 1 of 6 cycles  pegfilgrastim (Burnard Goodpasture), 6 mg, Subcutaneous, Once, 0 of 2 cycles  fosaprepitant (EMEND) IVPB, 150 mg, Intravenous, Once, 1 of 6 cycles  Administration: 150 mg (5/18/2023)  nivolumab (OPDIVO) IVPB, 360 mg (150 % of original dose 240 mg), Intravenous, Once, 1 of 3 cycles  Dose modification: 360 mg (original dose 240 mg, Cycle 1, Reason: Dose modified as per discussion with consulting physician)  Administration: 360 mg (5/18/2023)  CARBOplatin (PARAPLATIN) IVPB (GOG AUC DOSING), 642 5 mg, Intravenous, Once, 1 of 6 cycles  Administration: 642 5 mg (5/18/2023)  pemetrexed (ALIMTA) chemo infusion, 980 mg, Intravenous, Once, 1 of 6 cycles  Administration: 1,000 mg (5/18/2023)     Carcinoma of right lung ([facility] [address] )   4/13/2023 Initial Diagnosis    Carcinoma of right lung ([facility] [address] )     4/13/2023 -  Cancer Staged    Staging form: Lung, AJCC 8th Edition  - Clinical: Stage JAY (cT3, cN1, cM1b) - Signed by Barbara Castillo MD on 4/13/2023 5/18/2023 -  Chemotherapy    cyanocobalamin, 1,000 mcg, Intramuscular, Once, 1 of 4 cycles  Administration: 1,000 mcg (5/18/2023)  alteplase (CATHFLO), 2 mg, Intracatheter, Every 1 Minute as needed, 1 of 6 cycles  pegfilgrastim (NEULASTA ONPRO), 6 mg, Subcutaneous, Once, 0 of 2 cycles  fosaprepitant (EMEND) IVPB, 150 mg, Intravenous, Once, 1 of 6 cycles  Administration: 150 mg (5/18/2023)  nivolumab (OPDIVO) IVPB, 360 mg (150 % of original dose 240 mg), Intravenous, Once, 1 of 3 cycles  Dose modification: 360 mg (original dose 240 mg, Cycle 1, Reason: Dose modified as per discussion with consulting physician)  Administration: 360 mg (5/18/2023)  CARBOplatin (PARAPLATIN) IVPB (GOG AUC DOSING), 642 5 mg, Intravenous, Once, 1 of 6 cycles  Administration: 642 5 mg (5/18/2023)  pemetrexed (ALIMTA) chemo infusion, 980 mg, Intravenous, Once, 1 of 6 cycles  Administration: 1,000 mg (5/18/2023)         Interval history:    ROS: Review of Systems   Constitutional: Positive for fatigue  Negative for chills and fever  HENT: Positive for hearing loss  Negative for ear pain and sore throat  Eyes: Negative for pain and visual disturbance  Respiratory: Negative for cough and shortness of breath  Cardiovascular: Negative for chest pain and palpitations  Gastrointestinal: Positive for nausea  Negative for abdominal pain and vomiting  Genitourinary: Negative for dysuria and hematuria  Musculoskeletal: Positive for arthralgias  Negative for back pain  Skin: Negative for color change and rash  Neurological: Positive for numbness  Negative for seizures and syncope  Psychiatric/Behavioral: Positive for sleep disturbance  All other systems reviewed and are negative        Past Medical History:   Diagnosis Date   • Cancer ([facility] [address] ) 2001    Receint lung and brain lesions and prostate cancer in 2001   • Hypertension    • Prostate cancer ([facility] [address] ) 2001   • Rectal bleeding    • Stroke [facility])     2011         Past Surgical History:   Procedure Laterality Date   • COLONOSCOPY     • CRANIOTOMY Right 3/23/2023    Procedure: Right frontal CRANIOTOMY IMAGE-GUIDED FOR TUMOR;  Surgeon: Kalyan Laughlin MD;  Location: [facility];  Service: Neurosurgery   • IR PORT PLACEMENT  4/27/2023   • CA CYSTOURETHROSCOPY N/A 3/23/2023    Procedure: EUA, DEL CASTILLO INSERTION;  Surgeon: Nate Nunes MD;  Location: [facility];  Service: Urology   • PROSTATECTOMY     • TONSILLECTOMY         Social History     Socioeconomic History   • Marital status: /Civil Union     Spouse name: None   • Number of children: None   • Years of education: None   • Highest education level: None   Occupational History   • None   Tobacco Use   • Smoking status: Former     Packs/day: 1 00     Years: 15 00     Total pack years: 15 00     Types: Cigarettes     Quit date:      Years since quittin 4     Passive exposure: Never   • Smokeless tobacco: Never   • Tobacco comments:     i quit when i was 28  not sure of exact dates   Vaping Use   • Vaping Use: Never used   Substance and Sexual Activity   • Alcohol use: Yes     Alcohol/week: 6 0 standard drinks of alcohol     Types: 6 Cans of beer per week     Comment: socially   • Drug use: Not Currently     Types: Marijuana   • Sexual activity: Yes     Partners: Female     Birth control/protection: None   Other Topics Concern   • None   Social History Narrative   • None     Social Determinants of Health     Financial Resource Strain: Not on file   Food Insecurity: No Food Insecurity (3/20/2023)    Hunger Vital Sign    • Worried About Running Out of Food in the Last Year: Never true    • Ran Out of Food in the Last Year: Never true   Transportation Needs: No Transportation Needs (3/20/2023)    PRAPARE - Transportation    • Lack of Transportation (Medical): No    • Lack of Transportation (Non-Medical):  No   Physical Activity: Not on file   Stress: Not on file   Social Connections: Not on file   Intimate Partner Violence: Not on file   Housing Stability: Low Risk  (3/20/2023)    Housing Stability Vital Sign    • Unable to Pay for Housing in the Last Year: No    • Number of Places Lived in the Last Year: 1    • Unstable Housing in the Last Year: No       Family History   Problem Relation Age of Onset   • Dementia Mother            • Breast cancer Sister          "  • Prostate cancer Brother         Received Radiation       No Known Allergies      Current Outpatient Medications:   •  acetaminophen (TYLENOL) 325 mg tablet, Take 3 tablets (975 mg total) by mouth every 8 (eight) hours as needed for mild pain or headaches, Disp: 30 tablet, Rfl: 0  •  amLODIPine (NORVASC) 10 mg tablet, Take 1 tablet (10 mg total) by mouth daily, Disp: 90 tablet, Rfl: 3  •  atorvastatin (LIPITOR) 40 mg tablet, Take 1 tablet (40 mg total) by mouth daily after dinner, Disp: 30 tablet, Rfl: 0  •  folic acid ( Folic Acid) 1 mg tablet, Take 1 tablet (1 mg total) by mouth daily, Disp: 30 tablet, Rfl: 11  •  losartan (COZAAR) 50 mg tablet, Take 1 tablet (50 mg total) by mouth daily, Disp: 90 tablet, Rfl: 3  •  ondansetron (ZOFRAN) 8 mg tablet, Take 1 tablet (8 mg total) by mouth every 8 (eight) hours as needed for nausea or vomiting (Patient not taking: Reported on 5/16/2023), Disp: 20 tablet, Rfl: 3  No current facility-administered medications for this visit  Facility-Administered Medications Ordered in Other Visits:   •  alteplase (CATHFLO) injection 2 mg, 2 mg, Intracatheter, Q1MIN PRN, Alyssa Shields MD      Physical Exam:  /80 (BP Location: Left arm, Patient Position: Sitting, Cuff Size: Adult)   Pulse 82   Temp 97 5 °F (36 4 °C)   Resp 18   Ht 5' 9\" (1 753 m)   Wt 84 8 kg (187 lb)   SpO2 96%   BMI 27 62 kg/m²     Physical Exam  Constitutional:       Appearance: He is well-developed  HENT:      Head: Normocephalic and atraumatic  Nose: Nose normal    Eyes:      General: No scleral icterus  Right eye: No discharge  Left eye: No discharge  Conjunctiva/sclera: Conjunctivae normal       Pupils: Pupils are equal, round, and reactive to light  Neck:      Thyroid: No thyromegaly  Trachea: No tracheal deviation  Cardiovascular:      Rate and Rhythm: Normal rate and regular rhythm  Heart sounds: Normal heart sounds  No murmur heard       No friction " "rub    Pulmonary:      Effort: Pulmonary effort is normal  No respiratory distress  Breath sounds: Normal breath sounds  No wheezing or rales  Chest:      Chest wall: No tenderness  Abdominal:      General: There is no distension  Palpations: Abdomen is soft  There is no hepatomegaly or splenomegaly  Tenderness: There is no abdominal tenderness  There is no guarding or rebound  Musculoskeletal:         General: Swelling (Of the left upper extremity and lower extremities) present  No tenderness or deformity  Normal range of motion  Cervical back: Normal range of motion and neck supple  Right lower leg: Edema present  Left lower leg: Edema present  Lymphadenopathy:      Cervical: No cervical adenopathy  Skin:     General: Skin is warm and dry  Coloration: Skin is not pale  Findings: No erythema or rash  Neurological:      Mental Status: He is alert and oriented to person, place, and time  Cranial Nerves: No cranial nerve deficit  Coordination: Coordination normal       Deep Tendon Reflexes: Reflexes are normal and symmetric  Psychiatric:         Behavior: Behavior normal          Thought Content: Thought content normal          Judgment: Judgment normal            Labs:  Lab Results   Component Value Date    HCT 38 2 05/16/2023    HGB 11 9 (L) 05/16/2023    MCV 91 05/16/2023     05/16/2023    WBC 7 91 05/16/2023     Lab Results   Component Value Date    ALKPHOS 76 05/16/2023    ALT 9 05/16/2023    ANIONGAP 11 6 05/10/2018    AST 15 05/16/2023    BILITOT 0 8 05/10/2018    BUN 11 05/16/2023    CALCIUM 8 6 05/16/2023     05/16/2023    CO2 26 05/16/2023    CREATININE 0 74 05/16/2023    EGFR 94 05/16/2023    GLUF 130 (H) 04/10/2023    K 3 8 05/16/2023     05/10/2018    PROT 7 2 05/10/2018     No results found for: \"TSH\"    Patient voiced understanding and agreement in the above discussion   Aware to contact our office with questions/symptoms " in the interim

## 2023-06-08 ENCOUNTER — HOSPITAL ENCOUNTER (OUTPATIENT)
Dept: INFUSION CENTER | Facility: HOSPITAL | Age: 68
End: 2023-06-08
Attending: INTERNAL MEDICINE
Payer: COMMERCIAL

## 2023-06-08 ENCOUNTER — PATIENT OUTREACH (OUTPATIENT)
Dept: CASE MANAGEMENT | Facility: HOSPITAL | Age: 68
End: 2023-06-08

## 2023-06-08 VITALS
HEIGHT: 69 IN | HEART RATE: 85 BPM | TEMPERATURE: 97.6 F | SYSTOLIC BLOOD PRESSURE: 138 MMHG | DIASTOLIC BLOOD PRESSURE: 83 MMHG | BODY MASS INDEX: 27.36 KG/M2 | WEIGHT: 184.75 LBS | RESPIRATION RATE: 18 BRPM

## 2023-06-08 DIAGNOSIS — D70.1 CHEMOTHERAPY-INDUCED NEUTROPENIA (HCC): ICD-10-CM

## 2023-06-08 DIAGNOSIS — T45.1X5A CHEMOTHERAPY-INDUCED NEUTROPENIA (HCC): ICD-10-CM

## 2023-06-08 DIAGNOSIS — C79.9 METASTATIC ADENOCARCINOMA (HCC): Primary | ICD-10-CM

## 2023-06-08 DIAGNOSIS — C34.91 CARCINOMA OF RIGHT LUNG (HCC): Primary | ICD-10-CM

## 2023-06-08 DIAGNOSIS — E83.42 HYPOMAGNESEMIA: ICD-10-CM

## 2023-06-08 DIAGNOSIS — C34.91 CARCINOMA OF RIGHT LUNG (HCC): ICD-10-CM

## 2023-06-08 LAB — MAGNESIUM SERPL-MCNC: 2.2 MG/DL (ref 1.9–2.7)

## 2023-06-08 PROCEDURE — 96377 APPLICATON ON-BODY INJECTOR: CPT

## 2023-06-08 PROCEDURE — 96417 CHEMO IV INFUS EACH ADDL SEQ: CPT

## 2023-06-08 PROCEDURE — 96411 CHEMO IV PUSH ADDL DRUG: CPT

## 2023-06-08 PROCEDURE — 83735 ASSAY OF MAGNESIUM: CPT | Performed by: INTERNAL MEDICINE

## 2023-06-08 PROCEDURE — 96413 CHEMO IV INFUSION 1 HR: CPT

## 2023-06-08 PROCEDURE — 96367 TX/PROPH/DG ADDL SEQ IV INF: CPT

## 2023-06-08 RX ORDER — SODIUM CHLORIDE 9 MG/ML
20 INJECTION, SOLUTION INTRAVENOUS ONCE
Status: COMPLETED | OUTPATIENT
Start: 2023-06-08 | End: 2023-06-08

## 2023-06-08 RX ADMIN — PEGFILGRASTIM 6 MG: KIT SUBCUTANEOUS at 14:12

## 2023-06-08 RX ADMIN — FOSAPREPITANT 150 MG: 150 INJECTION, POWDER, LYOPHILIZED, FOR SOLUTION INTRAVENOUS at 11:30

## 2023-06-08 RX ADMIN — SODIUM CHLORIDE 20 ML/HR: 0.9 INJECTION, SOLUTION INTRAVENOUS at 11:00

## 2023-06-08 RX ADMIN — CARBOPLATIN 650 MG: 10 INJECTION, SOLUTION INTRAVENOUS at 13:04

## 2023-06-08 RX ADMIN — SODIUM CHLORIDE 1000 MG: 9 INJECTION, SOLUTION INTRAVENOUS at 12:47

## 2023-06-08 RX ADMIN — SODIUM CHLORIDE 360 MG: 9 INJECTION, SOLUTION INTRAVENOUS at 12:06

## 2023-06-08 RX ADMIN — DEXAMETHASONE SODIUM PHOSPHATE: 10 INJECTION, SOLUTION INTRAMUSCULAR; INTRAVENOUS at 11:02

## 2023-06-08 NOTE — PROGRESS NOTES
Pt & his wife stated that he did not get neulasta onpro after his first tx because it was not authorized by insurance  Per Cody Chávez RN, pt is OK to receive onpro as insurance auth was obtained after his first tx

## 2023-06-08 NOTE — PROGRESS NOTES
LSW met with patient and is wife, Marycollette this afternoon  LSW introduced self and offered any resources and support they may need  Patient reports feeling okay, just tired more often  He naps during the day and then does not sleep through the night  Pt's wife reports ome issues with their insurance because she carries pt's primary insurance is [employer] (federal plan), there becomes confusion at times and Medicare is accidentally billed and then she receives large bills for [employer]  [facility] covers all medical expenses  Ct's wife has corrected this issue now  Patient is looking forward to his daughter and grandson coming to visit next week  They're all going on a train ride to [place]  Patient and wife have no concerns at this time  LSW provided emotional support  LSW will follow up as needed

## 2023-06-08 NOTE — PROGRESS NOTES
Patient tolerated chemotherapy treatment without reaction or issues  Neulasta On-pro applied to Left arm  Green light flashing on D/C  Patient and his wife verbalize understanding of when to remove On-Pro  AVS given to patient  Patient ambulated off unit without incident  All personal belongings taken with patient

## 2023-06-13 ENCOUNTER — HOSPITAL ENCOUNTER (OUTPATIENT)
Dept: INFUSION CENTER | Facility: HOSPITAL | Age: 68
End: 2023-06-13

## 2023-06-21 DIAGNOSIS — E78.00 HYPERCHOLESTEROLEMIA: ICD-10-CM

## 2023-06-21 RX ORDER — ATORVASTATIN CALCIUM 40 MG/1
40 TABLET, FILM COATED ORAL
Qty: 30 TABLET | Refills: 0 | Status: SHIPPED | OUTPATIENT
Start: 2023-06-21 | End: 2023-07-21

## 2023-06-22 ENCOUNTER — OFFICE VISIT (OUTPATIENT)
Dept: NEUROLOGY | Facility: CLINIC | Age: 68
End: 2023-06-22
Payer: COMMERCIAL

## 2023-06-22 VITALS
TEMPERATURE: 98.3 F | BODY MASS INDEX: 27.4 KG/M2 | HEART RATE: 90 BPM | SYSTOLIC BLOOD PRESSURE: 148 MMHG | RESPIRATION RATE: 20 BRPM | WEIGHT: 185.6 LBS | DIASTOLIC BLOOD PRESSURE: 78 MMHG

## 2023-06-22 DIAGNOSIS — G93.89 BRAIN MASS: ICD-10-CM

## 2023-06-22 DIAGNOSIS — D70.1 CHEMOTHERAPY-INDUCED NEUTROPENIA (HCC): ICD-10-CM

## 2023-06-22 DIAGNOSIS — C34.91 CARCINOMA OF RIGHT LUNG (HCC): ICD-10-CM

## 2023-06-22 DIAGNOSIS — I10 ESSENTIAL HYPERTENSION: ICD-10-CM

## 2023-06-22 DIAGNOSIS — Z86.73 HISTORY OF CVA (CEREBROVASCULAR ACCIDENT): Primary | ICD-10-CM

## 2023-06-22 DIAGNOSIS — C79.9 METASTATIC ADENOCARCINOMA (HCC): Primary | ICD-10-CM

## 2023-06-22 DIAGNOSIS — E78.00 HYPERCHOLESTEROLEMIA: ICD-10-CM

## 2023-06-22 DIAGNOSIS — T45.1X5A CHEMOTHERAPY-INDUCED NEUTROPENIA (HCC): ICD-10-CM

## 2023-06-22 NOTE — PROGRESS NOTES
Review of Systems   Constitutional: Negative for appetite change, fatigue and fever  HENT: Negative  Negative for hearing loss, tinnitus, trouble swallowing and voice change  Eyes: Negative  Negative for photophobia, pain and visual disturbance  Respiratory: Negative  Negative for shortness of breath  Cardiovascular: Negative  Negative for palpitations  Gastrointestinal: Negative  Negative for nausea and vomiting  Endocrine: Negative  Negative for cold intolerance  Genitourinary: Negative  Negative for dysuria, frequency and urgency  Musculoskeletal: Negative for back pain, gait problem, myalgias and neck pain  Skin: Negative  Negative for rash  Allergic/Immunologic: Negative  Neurological: Positive for numbness (Left side)  Negative for dizziness, tremors, seizures, syncope, facial asymmetry, speech difficulty, weakness, light-headedness and headaches  Hematological: Negative  Does not bruise/bleed easily  Psychiatric/Behavioral: Negative  Negative for confusion, hallucinations and sleep disturbance

## 2023-06-22 NOTE — PATIENT INSTRUCTIONS
History of CVA:    I had the pleasure of seeing Qiana Enamorado today in the office at [facility] [address] [facility] in [place]  He is presenting today as a hospital follow-up in regards to his most recent small foci of acute to subacute infarct of the frontal lobe  He had presented to the hospital with left-sided weakness at that time  An MRI brain was performed and he was found to have a ring-enhancing lesion of the right frontal lobe, which was suspicious for metastatic disease from the patient's right lung adenocarcinoma  Patient's tumor was resected by Dr Jin Jha neurosurgery when he was in the hospital   Afterwards, the patient had been continuing with chemotherapy and is still following with hematology and oncology  He had an appointment with thoracic surgery recently who looked at the lesion on the patient's lung and is planning on surgically removing this mass if it seems to respond to the chemotherapy and shrink in size, then the patient would be a candidate for surgery  Patient is doing well at this time  Not noticing any new strokelike symptoms currently  He does have some left hand weakness/numbness from a stroke that he had 15 years ago he states  Otherwise, is doing quite all right at this time     -Patient is currently not taking any antiplatelet or any anticoagulation medication for poststroke prevention  Believe that the patient is most likely not taking aspirin or another antiplatelet agent because of the fact he may have surgery in the future on the right lung carcinoma with thoracic surgery  However, would like to talk with patient's thoracic surgeon, hematology/oncology, and neurosurgery to see if there is any reason as to why the patient is still discontinued on the aspirin and has not been advised to restart it  As long as we have an explanation as to why the patient is not taking aspirin I do not feel like we would have to start any antiplatelet medication at this time    Would also like to know the timetable as far as when we are restarting aspirin after the patient's surgery if that is the case   -Would like for the patient to complete a lipid panel before he comes back next time to monitor his LDL/cholesterol       - For ongoing stroke prevention continue: Lipitor 40 mg once daily  - Recommend to check blood pressure occasionally away from the doctor's office to make sure that those numbers are typically less than 130/80  If they are frequently higher than that, we recommend checking a little more often and to follow up with primary care team   - Will defer to primary care team for monitoring of cholesterol panel and blood sugar numbers with target LDL cholesterol of less than 70 and hemoglobin A1c less than 7%  - Recommend following a low salt, mediterranean diet   - Recommend routine physical exercise as tolerated     We will plan for him to return to the office in 6 time to see on of the APPs or Dr Rodger Tatum but would be happy to see him sooner if the need should arise  If he has any symptoms concerning for TIA or stroke including sudden painless loss of vision or double vision, difficulty speaking or swallowing, vertigo/room spinning that does not quickly resolve, or weakness/numbness/loss of coordination affecting 1 side of the face or body he should proceed by ambulance to the nearest emergency room immediately

## 2023-06-22 NOTE — PROGRESS NOTES
Patient ID: Kelsea Koenig is a 77 y o  male who presents to the [facility]  Assessment/Plan:    History of CVA:    I had the pleasure of seeing Amauri Nova today in the office at [place] [address] [facility] in [facility]  He is presenting today as a hospital follow-up in regards to his most recent small foci of acute to subacute infarct of the frontal lobe  He had presented to the hospital with left-sided weakness at that time  An MRI brain was performed and he was found to have a ring-enhancing lesion of the right frontal lobe, which was suspicious for metastatic disease from the patient's right lung adenocarcinoma  Patient's tumor was resected by Dr Abilio Patel neurosurgery when he was in the hospital   Afterwards, the patient had been continuing with chemotherapy and is still following with hematology and oncology  He had an appointment with thoracic surgery recently who looked at the lesion on the patient's lung and is planning on surgically removing this mass if it seems to respond to the chemotherapy and shrink in size, then the patient would be a candidate for surgery  Patient is doing well at this time  Not noticing any new strokelike symptoms currently  He does have some left hand weakness/numbness from a stroke that he had 15 years ago he states  Otherwise, is doing quite all right at this time     -Patient is currently not taking any antiplatelet or any anticoagulation medication for poststroke prevention  Believe that the patient is most likely not taking aspirin or another antiplatelet agent because of the fact he may have surgery in the future on the right lung carcinoma with thoracic surgery  However, would like to talk with patient's thoracic surgeon, hematology/oncology, and neurosurgery to see if there is any reason as to why the patient is still discontinued on the aspirin and has not been advised to restart it    As long as we have an explanation as to why the patient is not "taking aspirin I do not feel like we would have to start any antiplatelet medication at this time  Would also like to know the timetable as far as when we are restarting aspirin after the patient's surgery if that is the case   -Would like for the patient to complete a lipid panel before he comes back next time to monitor his LDL/cholesterol       - For ongoing stroke prevention continue: Lipitor 40 mg once daily  - Recommend to check blood pressure occasionally away from the doctor's office to make sure that those numbers are typically less than 130/80  If they are frequently higher than that, we recommend checking a little more often and to follow up with primary care team   - Will defer to primary care team for monitoring of cholesterol panel and blood sugar numbers with target LDL cholesterol of less than 70 and hemoglobin A1c less than 7%  - Recommend following a low salt, mediterranean diet   - Recommend routine physical exercise as tolerated     We will plan for him to return to the office in 6 time to see on of the APPs or Dr Minh Denis but would be happy to see him sooner if the need should arise  If he has any symptoms concerning for TIA or stroke including sudden painless loss of vision or double vision, difficulty speaking or swallowing, vertigo/room spinning that does not quickly resolve, or weakness/numbness/loss of coordination affecting 1 side of the face or body he should proceed by ambulance to the nearest emergency room immediately  Subjective:    HPI      For Review/Hospital Course:    \"76 year old male with HTN and history of R thalamic CVA about 10 years ago admitted with 1 week history of progressively worsening L sided numbness and weakness  Patient presented to [facility] and underwent CTA head and neck which demonstrated R frontal lobe cystic lesion with surrounding vasogenic edema consistent with metastatic disease as well as RUL mass with right hilar adenopathy   MRI " "brain was completed and demonstrates rim enhancing centrally nectrotic lesion in the right frontal lobe with surrounding vasogenic edema and also a small focus of anterior frontal acute to subacute ischemia  Ischemia possibly related to underlying tumor and swelling, but cannot entirely exclude embolic infarct secondary to hypercoagulability in setting of likely malignancy      Work-up for brain mass is underway and neurosurgery consult is pending  \" - per Riley Blunt PA-C, 03/20/2023    Patient was recommended to continue steroids and AED per neurosurgery at the hospital, patient was on Decadron 10 mg every 24 hours and Keppra 500 mg every 12 hours  Patient was recommended to continue with aspirin 81 mg once daily for secondary stroke prevention and also recommended to continue on atorvastatin 40 mg once daily for secondary stroke prevention  Interval History:      New stroke symptoms/residual symptoms:    Any new, sudden onset weakness, numbness, facial droop, slurred speech, difficulty speaking, trouble swallowing, persistent vertigo, or sudden double vision or vision loss? No new stroke like symptoms since coming back from the hospital      Residual symptoms include: weakness of the left UE/LE: upper extremity from the stroke that had occurred years prior  Some numbness on the outside of the right hand that started after surgery    Stroke Etiology and Risk Factor modification:    Stroke risk factors were evaluated including: Hypercoagulability due to carcinoma, hypertension, brain compression, hypercholesterolemia    AP/AC therapy: Not taking any antiplatelet agents at the moment  Patient does not know why aspirin had been discontinued  Statin therapy: Lipitor 40 mg once daily     Blood pressure today and as of late: 148/78, checks blood pressure at home every once and awhile  Usually systolic is between 271 or 130 at home  Taking losartan and amlodipine       Most recent LDL: 91 mg/dL    Most recent " hemoglobin A1C: 5 9%     Cardiology evaluation? Any cardiac monitoring required?: No cardiology follow up currently  Endocrinology evaluation? Following proper glycemic treatment/diet? No diabetes at this time     Lifestyle history/modifications:    Diet/Exercise regimen: usually eating cereal or eggs in the morning, getting his fruits and vegetables in now  Trying to stay active, has been walking around and moving around  Walking still and use to rise his bike before chemotherapy  Any physical therapy, occupational therapy or speech therapy performed/required at this time?:     Any difficulty with sleep? Has some pain with the chemotherapy affecting his joints, falling asleep relatively quickly from fatigue    Any history of sleep apnea apnea? CPAP compliance?: No snoring or sleep apnea    Post-stroke depression/anxiety? Trying to stay positive with his diagnosis at this time     Any history of smoking? 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  Patient did have a brain metastases resected in the hospital by Dr Igor Person  Patient is still currently going through chemotherapy at the moment, however states that cardiothoracic surgery may recommend resecting the adenocarcinoma of the lung if the tumor response to chemotherapy and begins to decrease in size      Lab Results   Component Value Date/Time    CHOLESTEROL 147 03/19/2023 11:48 AM     Lab Results   Component Value Date/Time    TRIG 64 03/19/2023 11:48 AM    TRIG 53 05/10/2018 01:39 PM     Lab Results   Component Value Date/Time    HDL 43 03/19/2023 11:48 AM    HDL 47 05/10/2018 01:39 PM     Lab Results   Component Value Date/Time    LDLCALC 91 03/19/2023 11:48 AM    LDLCALC 109 9 05/10/2018 01:39 PM       Lab Results   Component Value Date/Time    HGBA1C 5 9 (H) 03/22/2023 01:27 PM     Lab Results   Component Value Date/Time     03/22/2023 01:27 PM           Past Medical History:   Diagnosis Date   • Cancer ([facility] ) 2001    Receint lung and brain lesions and prostate cancer in 2001   • Hypertension    • Prostate cancer ([facility] ) 2001   • Rectal bleeding    • Stroke ([place] [address] )     2011         Current Outpatient Medications:   •  acetaminophen (TYLENOL) 325 mg tablet, Take 3 tablets (975 mg total) by mouth every 8 (eight) hours as needed for mild pain or headaches, Disp: 30 tablet, Rfl: 0  •  amLODIPine (NORVASC) 10 mg tablet, Take 1 tablet (10 mg total) by mouth daily, Disp: 90 tablet, Rfl: 3  •  atorvastatin (LIPITOR) 40 mg tablet, Take 1 tablet (40 mg total) by mouth daily after dinner, Disp: 30 tablet, Rfl: 0  •  folic acid (KP Folic Acid) 1 mg tablet, Take 1 tablet (1 mg total) by mouth daily, Disp: 30 tablet, Rfl: 11  •  losartan (COZAAR) 50 mg tablet, Take 1 tablet (50 mg total) by mouth daily, Disp: 90 tablet, Rfl: 3  •  ondansetron (ZOFRAN) 8 mg tablet, Take 1 tablet (8 mg total) by mouth every 8 (eight) hours as needed for nausea or vomiting (Patient not taking: Reported on 5/16/2023), Disp: 20 tablet, Rfl: 3     Objective:    Physical Exam:                                                                 Vitals:            Constitutional:    /78 (BP Location: Right arm, Patient Position: Sitting, Cuff Size: Standard)   Pulse 90   Temp 98 3 °F (36 8 °C) (Temporal)   Resp 20   Wt 84 2 kg (185 lb 9 6 oz)   BMI 27 40 kg/m²   BP Readings from Last 3 Encounters:   06/22/23 148/78   06/08/23 138/83   06/06/23 149/85     Pulse Readings from Last 3 Encounters:   06/22/23 90   06/08/23 85   06/06/23 82         Well developed, well nourished, well groomed  No dysmorphic features  Psychiatric:  Normal behavior and appropriate affect        Neurological Examination:     Mental status/cognitive function:   Orientated to time, place and person   Recent and remote memory intact  Attention span and concentration as well as fund of knowledge are appropriate for age  Normal language and spontaneous speech  Cranial Nerves:  II-visual fields full  III, IV, VI-Pupils were equal, round, and reactive to light and accomodation  Extraocular movements were full and conjugate without nystagmus  Conjugate gaze, normal smooth pursuits, normal saccades   V-facial sensation symmetric  VII-facial expression symmetric, intact forehead wrinkle, strong eye closure, symmetric smile    VIII-hearing grossly intact bilaterally   IX, X-palate elevation symmetric, no dysarthria  XI-shoulder shrug strength intact    XII-tongue protrusion midline  Motor Exam: symmetric bulk and tone throughout, no pronator drift  Power/strength 5/5 bilateral upper and lower extremities, slight decrease in strength of the left upper and lower extremity compared to the right upper and lower extremity no atrophy, fasciculations or abnormal movements noted  Sensory: grossly intact light touch in all extremities  Reflexes: brachioradialis 2+, biceps 2+, knee 2+, bilaterally  Coordination: Finger nose finger intact bilaterally, no apparent dysmetria, ataxia or tremor noted  Gait: steady casual and tandem gait  ROS:    Review of Systems   Constitutional: Negative for appetite change, fatigue and fever  HENT: Negative  Negative for hearing loss, tinnitus, trouble swallowing and voice change  Eyes: Negative  Negative for photophobia, pain and visual disturbance  Respiratory: Negative  Negative for shortness of breath  Cardiovascular: Negative  Negative for palpitations  Gastrointestinal: Negative  Negative for nausea and vomiting  Endocrine: Negative  Negative for cold intolerance  Genitourinary: Negative  Negative for dysuria, frequency and urgency  Musculoskeletal: Negative for back pain, gait problem, myalgias and neck pain  Skin: Negative  Negative for rash  Allergic/Immunologic: Negative  Neurological: Positive for numbness (Left side)  Negative for dizziness, tremors, seizures, syncope, facial asymmetry, speech difficulty, weakness, light-headedness and headaches  Hematological: Negative  Does not bruise/bleed easily  Psychiatric/Behavioral: Negative  Negative for confusion, hallucinations and sleep disturbance           I have spent 35 minutes today on this case including chart review, performing history and exam, patient counseling, and documentation/communication      Hernesto Morocho PA-C  6/22/2023 10:41 AM

## 2023-06-26 ENCOUNTER — DOCUMENTATION (OUTPATIENT)
Dept: HEMATOLOGY ONCOLOGY | Facility: CLINIC | Age: 68
End: 2023-06-26

## 2023-06-26 ENCOUNTER — HOSPITAL ENCOUNTER (OUTPATIENT)
Dept: MRI IMAGING | Facility: HOSPITAL | Age: 68
Discharge: HOME | End: 2023-06-26
Payer: COMMERCIAL

## 2023-06-26 DIAGNOSIS — C79.31 METASTASIS TO BRAIN (HCC): ICD-10-CM

## 2023-06-26 PROCEDURE — 70553 MRI BRAIN STEM W/O & W/DYE: CPT

## 2023-06-26 PROCEDURE — G1004 CDSM NDSC: HCPCS

## 2023-06-26 PROCEDURE — A9585 GADOBUTROL INJECTION: HCPCS | Performed by: STUDENT IN AN ORGANIZED HEALTH CARE EDUCATION/TRAINING PROGRAM

## 2023-06-26 RX ADMIN — GADOBUTROL 8 ML: 604.72 INJECTION INTRAVENOUS at 08:09

## 2023-06-26 NOTE — PROGRESS NOTES
Read requested for :    MRI brain w wo contrast (Order: [id number]) - 6/26/2023      Verbal confirmation from Flora Squires

## 2023-06-27 ENCOUNTER — HOSPITAL ENCOUNTER (OUTPATIENT)
Dept: CT IMAGING | Facility: HOSPITAL | Age: 68
Discharge: HOME | End: 2023-06-27
Payer: COMMERCIAL

## 2023-06-27 ENCOUNTER — TELEPHONE (OUTPATIENT)
Dept: HEMATOLOGY ONCOLOGY | Facility: CLINIC | Age: 68
End: 2023-06-27

## 2023-06-27 ENCOUNTER — OFFICE VISIT (OUTPATIENT)
Dept: HEMATOLOGY ONCOLOGY | Facility: CLINIC | Age: 68
End: 2023-06-27
Payer: COMMERCIAL

## 2023-06-27 ENCOUNTER — HOSPITAL ENCOUNTER (OUTPATIENT)
Dept: INFUSION CENTER | Facility: HOSPITAL | Age: 68
Discharge: HOME | End: 2023-06-27
Payer: COMMERCIAL

## 2023-06-27 VITALS
RESPIRATION RATE: 18 BRPM | OXYGEN SATURATION: 96 % | HEIGHT: 69 IN | SYSTOLIC BLOOD PRESSURE: 140 MMHG | DIASTOLIC BLOOD PRESSURE: 78 MMHG | BODY MASS INDEX: 26.96 KG/M2 | HEART RATE: 93 BPM | TEMPERATURE: 97.8 F | WEIGHT: 182 LBS

## 2023-06-27 DIAGNOSIS — C79.9 METASTATIC ADENOCARCINOMA (HCC): ICD-10-CM

## 2023-06-27 DIAGNOSIS — Z86.73 HISTORY OF CVA (CEREBROVASCULAR ACCIDENT): ICD-10-CM

## 2023-06-27 DIAGNOSIS — Z45.2 ENCOUNTER FOR CENTRAL LINE CARE: ICD-10-CM

## 2023-06-27 DIAGNOSIS — R07.89 CHEST DISCOMFORT: ICD-10-CM

## 2023-06-27 DIAGNOSIS — M79.89 SWELLING OF LEFT HAND: ICD-10-CM

## 2023-06-27 DIAGNOSIS — C79.31 METASTASIS TO BRAIN (HCC): ICD-10-CM

## 2023-06-27 DIAGNOSIS — E03.2 UNDERACTIVE THYROID DUE DRUGS: ICD-10-CM

## 2023-06-27 DIAGNOSIS — D70.1 CHEMOTHERAPY-INDUCED NEUTROPENIA (HCC): ICD-10-CM

## 2023-06-27 DIAGNOSIS — E83.42 HYPOMAGNESEMIA: Primary | ICD-10-CM

## 2023-06-27 DIAGNOSIS — C34.11 MALIGNANT NEOPLASM OF UPPER LOBE OF RIGHT LUNG (HCC): Primary | ICD-10-CM

## 2023-06-27 DIAGNOSIS — E78.00 HYPERCHOLESTEROLEMIA: ICD-10-CM

## 2023-06-27 DIAGNOSIS — R06.09 DYSPNEA ON EXERTION: ICD-10-CM

## 2023-06-27 DIAGNOSIS — C34.91 CARCINOMA OF RIGHT LUNG (HCC): Primary | ICD-10-CM

## 2023-06-27 DIAGNOSIS — R05.8 OTHER COUGH: ICD-10-CM

## 2023-06-27 DIAGNOSIS — D64.9 NORMOCYTIC ANEMIA: ICD-10-CM

## 2023-06-27 DIAGNOSIS — T45.1X5A CHEMOTHERAPY-INDUCED NEUTROPENIA (HCC): ICD-10-CM

## 2023-06-27 LAB
ALBUMIN SERPL BCP-MCNC: 4 G/DL (ref 3.5–5)
ALP SERPL-CCNC: 81 U/L (ref 34–104)
ALT SERPL W P-5'-P-CCNC: 13 U/L (ref 7–52)
AMYLASE SERPL-CCNC: 27 IU/L (ref 29–103)
ANION GAP SERPL CALCULATED.3IONS-SCNC: 8 MMOL/L
AST SERPL W P-5'-P-CCNC: 15 U/L (ref 13–39)
BASOPHILS # BLD AUTO: 0.09 THOUSANDS/ÂΜL (ref 0–0.1)
BASOPHILS NFR BLD AUTO: 1 % (ref 0–1)
BILIRUB SERPL-MCNC: 0.56 MG/DL (ref 0.2–1)
BUN SERPL-MCNC: 10 MG/DL (ref 5–25)
CALCIUM SERPL-MCNC: 9.2 MG/DL (ref 8.4–10.2)
CHLORIDE SERPL-SCNC: 103 MMOL/L (ref 96–108)
CHOLEST SERPL-MCNC: 96 MG/DL
CO2 SERPL-SCNC: 28 MMOL/L (ref 21–32)
CREAT SERPL-MCNC: 0.69 MG/DL (ref 0.6–1.3)
EOSINOPHIL # BLD AUTO: 0.1 THOUSAND/ÂΜL (ref 0–0.61)
EOSINOPHIL NFR BLD AUTO: 1 % (ref 0–6)
ERYTHROCYTE [DISTWIDTH] IN BLOOD BY AUTOMATED COUNT: 15.2 % (ref 11.6–15.1)
GFR SERPL CREATININE-BSD FRML MDRD: 96 ML/MIN/1.73SQ M
GLUCOSE P FAST SERPL-MCNC: 116 MG/DL (ref 65–99)
GLUCOSE SERPL-MCNC: 116 MG/DL (ref 65–140)
HCT VFR BLD AUTO: 36 % (ref 36.5–49.3)
HDLC SERPL-MCNC: 31 MG/DL
HGB BLD-MCNC: 11.2 G/DL (ref 12–17)
IMM GRANULOCYTES # BLD AUTO: 0.15 THOUSAND/UL (ref 0–0.2)
IMM GRANULOCYTES NFR BLD AUTO: 2 % (ref 0–2)
LDH SERPL-CCNC: 181 U/L (ref 140–271)
LDLC SERPL CALC-MCNC: 50 MG/DL (ref 0–100)
LIPASE SERPL-CCNC: 24 U/L (ref 11–82)
LYMPHOCYTES # BLD AUTO: 1.39 THOUSANDS/ÂΜL (ref 0.6–4.47)
LYMPHOCYTES NFR BLD AUTO: 19 % (ref 14–44)
MAGNESIUM SERPL-MCNC: 1.8 MG/DL (ref 1.9–2.7)
MCH RBC QN AUTO: 28.4 PG (ref 26.8–34.3)
MCHC RBC AUTO-ENTMCNC: 31.1 G/DL (ref 31.4–37.4)
MCV RBC AUTO: 91 FL (ref 82–98)
MONOCYTES # BLD AUTO: 0.6 THOUSAND/ÂΜL (ref 0.17–1.22)
MONOCYTES NFR BLD AUTO: 8 % (ref 4–12)
NEUTROPHILS # BLD AUTO: 5.2 THOUSANDS/ÂΜL (ref 1.85–7.62)
NEUTS SEG NFR BLD AUTO: 69 % (ref 43–75)
NRBC BLD AUTO-RTO: 0 /100 WBCS
PLATELET # BLD AUTO: 291 THOUSANDS/UL (ref 149–390)
PMV BLD AUTO: 9.2 FL (ref 8.9–12.7)
POTASSIUM SERPL-SCNC: 3.7 MMOL/L (ref 3.5–5.3)
PROT SERPL-MCNC: 7.3 G/DL (ref 6.4–8.4)
RBC # BLD AUTO: 3.94 MILLION/UL (ref 3.88–5.62)
SODIUM SERPL-SCNC: 139 MMOL/L (ref 135–147)
T3FREE SERPL-MCNC: 3.5 PG/ML (ref 2.5–3.9)
TRIGL SERPL-MCNC: 75 MG/DL
TSH SERPL DL<=0.05 MIU/L-ACNC: 2.83 UIU/ML (ref 0.45–4.5)
URATE SERPL-MCNC: 5 MG/DL (ref 3.5–8.5)
WBC # BLD AUTO: 7.53 THOUSAND/UL (ref 4.31–10.16)

## 2023-06-27 PROCEDURE — 83690 ASSAY OF LIPASE: CPT | Performed by: INTERNAL MEDICINE

## 2023-06-27 PROCEDURE — 82150 ASSAY OF AMYLASE: CPT | Performed by: INTERNAL MEDICINE

## 2023-06-27 PROCEDURE — 99215 OFFICE O/P EST HI 40 MIN: CPT | Performed by: NURSE PRACTITIONER

## 2023-06-27 PROCEDURE — 85025 COMPLETE CBC W/AUTO DIFF WBC: CPT

## 2023-06-27 PROCEDURE — 80053 COMPREHEN METABOLIC PANEL: CPT

## 2023-06-27 PROCEDURE — G1004 CDSM NDSC: HCPCS

## 2023-06-27 PROCEDURE — 83735 ASSAY OF MAGNESIUM: CPT

## 2023-06-27 PROCEDURE — 71275 CT ANGIOGRAPHY CHEST: CPT

## 2023-06-27 PROCEDURE — 84550 ASSAY OF BLOOD/URIC ACID: CPT | Performed by: INTERNAL MEDICINE

## 2023-06-27 PROCEDURE — 84481 FREE ASSAY (FT-3): CPT | Performed by: INTERNAL MEDICINE

## 2023-06-27 PROCEDURE — 84443 ASSAY THYROID STIM HORMONE: CPT

## 2023-06-27 PROCEDURE — 80061 LIPID PANEL: CPT

## 2023-06-27 PROCEDURE — 83615 LACTATE (LD) (LDH) ENZYME: CPT | Performed by: INTERNAL MEDICINE

## 2023-06-27 RX ORDER — SODIUM CHLORIDE 9 MG/ML
20 INJECTION, SOLUTION INTRAVENOUS ONCE
Status: CANCELLED | OUTPATIENT
Start: 2023-06-29

## 2023-06-27 RX ORDER — CYANOCOBALAMIN 1000 UG/ML
1000 INJECTION, SOLUTION INTRAMUSCULAR; SUBCUTANEOUS ONCE
Status: CANCELLED | OUTPATIENT
Start: 2023-06-29 | End: 2023-06-29

## 2023-06-27 RX ADMIN — IOHEXOL 60 ML: 350 INJECTION, SOLUTION INTRAVENOUS at 11:01

## 2023-06-27 NOTE — PROGRESS NOTES
Port flushed freely  Good blood return noted  Central labs drawn per orders  Port deaccessed without issue  Patient tolerated well  AVS declined  Patient has Mychart  Patient ambulated off unit without incident  All personal belongings taken with patient

## 2023-06-27 NOTE — PROGRESS NOTES
Hematology/Oncology Outpatient Follow-up  Thuy Tee 77 y o  male 1955 [id number]    Date:  6/27/2023      Assessment and Plan:  1  Malignant neoplasm of upper lobe of right lung [facility])  Patient will be due for cycle #3 of his neoadjuvant treatment with Alimta, carboplatin and nivolumab this Thursday, 6/29/2023  He is tolerating the treatment with some manageable side effects including bony aches, constipation, taste changes/metallic taste and watery eyes  Plan is to complete 3 cycles of neoadjuvant treatment followed by PET CT scan  Pending PET CT scan results will be reevaluated to see if he is a surgical candidate versus concurrent chemoradiation  We will order and schedule a PET CT scan to be done before his next office visit in 3 weeks from now  - CBC and differential; Future  - Comprehensive metabolic panel; Future  - Magnesium; Future  - LD,Blood; Future  - C-reactive protein; Future  - Sedimentation rate, automated; Future  - Iron Panel (Includes Ferritin, Iron Sat%, Iron, and TIBC); Future  - NM PET CT skull base to mid thigh; Future    2  Metastasis to brain [facility])  He is status post surgical resection of the oligometastatic brain lesion followed by postoperative RT with SRS/SRT in 5 fractions to avoid local recurrence  He did just have MRI of the brain yesterday which seem to show improvement posttreatment no new or additional brain lesions or other significant findings noted  He has follow-up with radiation oncology next week  3  Normocytic anemia  Mild and likely treatment related  Is taking folic acid and getting vitamin B12 injections for prophylaxis with Alimta treatment  We will check iron panel before his next office visit for completeness sake  - CBC and differential; Future  - Comprehensive metabolic panel; Future  - LD,Blood; Future  - C-reactive protein; Future  - Sedimentation rate, automated;  Future  - Iron Panel (Includes Ferritin, Iron Sat%, Iron, and TIBC); Future    4  Swelling of left hand  Patient seems to have swelling of his left hand and wrist   He also reports that he has some swelling to his left lower extremity/left foot as well  Compression stockings  Is already scheduled for a venous duplex of the left upper extremity, bilateral lower extremity  We will add uric acid to his laboratory studies this morning to rule out gout  - Uric acid; Future    5  Chest discomfort  Patient states that he has been having dyspnea with exertion and cough since starting treatment which has been stable  However he does admit over the past week or so he has been noticing some anterior chest discomfort/abnormality in his chest when he is taking deep breath  Satting 96% on room air today  Is slightly tachycardic 93 however his baseline heart rate tends to be around 80-90s  We will pursue stat CTA of the chest today to rule out acute PE as the cause of his new symptom  We will also evaluate for any other significant chest abnormalities to explain his symptoms including pneumonitis, pneumonia, pleural effusion,etc - awaiting results  - CTA chest pe study; Future      HPI:  Patient presents today for a follow-up visit  He is due for cycle 3 chemotherapy plus immunotherapy this Thursday 6/29 pending his repeat laboratory studies  He mentions that his left hand and wrist have been swollen/inflamed his left foot and left ankle region have been swollen as well  Is already scheduled for venous duplex of the bilateral lower extremities and left upper extremity 7/5/2023  He is wearing compression stockings  He continues to tolerate his chemotherapy with some tolerable side effects including bony aches which could be Neulasta related  He does mention that he has some worsening knee discomfort especially at hour sleep is taking Tylenol and using topical CBD/IcyHot with some relief    He also states that he has been having constipation since started treatment but seems to improve with the use of MiraLAX as needed  He also notes that he has been having some watery eyes  Reports some mild nausea but believes this may be more so due to taste changes and metallic taste from his chemo  He also admits to me today that for the past week or so he has been noticing chest discomfort when he takes a deep breath  He does report ongoing cough and dyspnea with exertion which has been ongoing since he started treatment  O2 96% on room air today  Some of his laboratory studies from this morning were reported after the visit  White cells and platelets are normal, he has stable mild normocytic anemia H&H 11 2/36, MCV 91  Glucose 116 remaining metabolic panel including kidney function and liver enzymes are normal   Magnesium is slightly lower than average 1 8  LDH is normal   TSH is pending  He had a repeat MRI of the brain ordered by radiation oncology just yesterday 6/26/2023:  IMPRESSION:  1  Evolving/resorbed blood products and diminishing/near completely resolved vasogenic edema around the right frontal treatment cavity which demonstrates a small amount of surrounding curvilinear smooth enhancement possibly reactive  Continued clinical   and imaging surveillance advised  2  No new or additional brain metastases  3  No acute infarction, intracranial hemorrhage  4  Mild, chronic microangiopathy  Oncology History Overview Note   Patient with history of prostate cancer status post prostatectomy in 2001  History of stroke in 2011, hypertension, etc     The patient presented to the hospital on 3/19/2023 with 1 week of progressive left-sided weakness  CTA of the brain showed right frontal lobe cystic lesion with surrounding vasogenic edema consistent with metastatic disease    The patient then had CT scan of the chest abdomen pelvis on 3/20/2023 which showed:  IMPRESSION:  3 8 x 3 5 cm right upper lobe lung mass with associated overlying pleural tag and adjacent to pleural thickening, this may be due to pleural reaction or mild pleural invasion  No contralateral lung nodules or mass  Right hilar lymphadenopathy  Small lower right paratracheal left paratracheal lymph node do not meet the criteria for pathologic enlargement on the bases of size or morphology  There is no CT evidence of for metastatic disease in the abdomen and pelvis  No lytic destructive lesion in the visualized bony skeleton  The study was marked in EPIC for significant notification  Bone scan was negative  MRI of the brain on 3/21/2023 showed:  IMPRESSION:  Unchanged 2 5 cm right parasagittal frontal lobe vertex mass with central necrosis and marked perilesional vasogenic edema unchanged  Favor metastatic disease (given lung mass) over primary high-grade glioma  Unchanged small subacute infarct in right frontal lobe  Unchanged 0 2 cm leftward midline shift  No new acute intracranial abnormality  The left-sided weakness improved with dexamethasone  The patient then was taken to the OR on 3/23/2023 and had right frontal craniotomy and resection of the right parasagittal frontal lobe mass  The pathology was compatible with metastatic non-small cell cancer  The molecular evaluation through [facility] showed PD-L1 of 100% with high TMB of 10  No oncogenic  mutation was found  Completed postoperative RT with SRS/SRT in 5 fractions to avoid local recurrence  He was then seen by the radiation oncology team and thoracic surgical team   His case was discussed at the multidisciplinary thoracic tumor conference  Was felt that he might be a surgical candidate for robotic right upper lobectomy and lymph node dissection after neoadjuvant treatment with chemotherapy and immunotherapy for his clinical T3N1 right upper lobe lung adenocarcinoma       Metastatic adenocarcinoma ([address] )   2023 Initial Diagnosis    Metastatic adenocarcinoma ([address] )     3/23/2023 Biopsy    Brain, right frontal mass (biopsy):  - Metastatic non-small cell carcinoma     Comment:  - Tumor cells stain diffusely for CAM5 2, CKAE1/3, CK7, TTF1 and NapsinA with absent CK20, p63, CK5/6, p40 expression  This immunopanel favors a metastatic lung adenocarcinoma         5/18/2023 -  Chemotherapy    cyanocobalamin, 1,000 mcg, Intramuscular, Once, 1 of 4 cycles  Administration: 1,000 mcg (5/18/2023)  alteplase (CATHFLO), 2 mg, Intracatheter, Every 1 Minute as needed, 2 of 6 cycles  pegfilgrastim (Beka Lopez), 6 mg, Subcutaneous, Once, 1 of 2 cycles  Administration: 6 mg (6/8/2023)  fosaprepitant (EMEND) IVPB, 150 mg, Intravenous, Once, 2 of 6 cycles  Administration: 150 mg (5/18/2023), 150 mg (6/8/2023)  nivolumab (OPDIVO) IVPB, 360 mg (150 % of original dose 240 mg), Intravenous, Once, 2 of 3 cycles  Dose modification: 360 mg (original dose 240 mg, Cycle 1, Reason: Dose modified as per discussion with consulting physician)  Administration: 360 mg (5/18/2023), 360 mg (6/8/2023)  CARBOplatin (PARAPLATIN) IVPB (GOG AUC DOSING), 642 5 mg, Intravenous, Once, 2 of 6 cycles  Administration: 642 5 mg (5/18/2023), 650 mg (6/8/2023)  pemetrexed (ALIMTA) chemo infusion, 980 mg, Intravenous, Once, 2 of 6 cycles  Administration: 1,000 mg (5/18/2023), 1,000 mg (6/8/2023)     Carcinoma of right lung ([address] )   4/13/2023 Initial Diagnosis    Carcinoma of right lung ([address] )     4/13/2023 -  Cancer Staged    Staging form: Lung, AJCC 8th Edition  - Clinical: Stage JAY (cT3, cN1, cM1b) - Signed by Alberto Tolentino MD on 4/13/2023 5/18/2023 -  Chemotherapy    cyanocobalamin, 1,000 mcg, Intramuscular, Once, 1 of 4 cycles  Administration: 1,000 mcg (5/18/2023)  alteplase (CATHFLO), 2 mg, Intracatheter, Every 1 Minute as needed, 2 of 6 cycles  pegfilgrastim (Beka Lopez), 6 mg, Subcutaneous, Once, 1 of 2 cycles  Administration: 6 mg (6/8/2023)  fosaprepitant (EMEND) IVPB, 150 mg, Intravenous, Once, 2 of 6 cycles  Administration: 150 mg (5/18/2023), 150 mg (6/8/2023)  nivolumab (OPDIVO) IVPB, 360 mg (150 % of original dose 240 mg), Intravenous, Once, 2 of 3 cycles  Dose modification: 360 mg (original dose 240 mg, Cycle 1, Reason: Dose modified as per discussion with consulting physician)  Administration: 360 mg (5/18/2023), 360 mg (6/8/2023)  CARBOplatin (PARAPLATIN) IVPB (GOG AUC DOSING), 642 5 mg, Intravenous, Once, 2 of 6 cycles  Administration: 642 5 mg (5/18/2023), 650 mg (6/8/2023)  pemetrexed (ALIMTA) chemo infusion, 980 mg, Intravenous, Once, 2 of 6 cycles  Administration: 1,000 mg (5/18/2023), 1,000 mg (6/8/2023)         Interval history:    ROS: Review of Systems   Constitutional: Positive for appetite change  Eyes: Positive for discharge (watery)  Respiratory: Positive for cough and shortness of breath  Cardiovascular: Positive for leg swelling (LLE/left hand)  Gastrointestinal: Positive for constipation and nausea  Taste changes/metallic taste   Musculoskeletal: Positive for arthralgias and myalgias  Neurological: Positive for dizziness and numbness  Psychiatric/Behavioral: Positive for sleep disturbance  All other systems reviewed and are negative        Past Medical History:   Diagnosis Date   • Cancer ([address] ) 2001    Receint lung and brain lesions and prostate cancer in 2001   • Hypertension    • Prostate cancer ([address] ) 2001   • Rectal bleeding    • Stroke [facility])     2011         Past Surgical History:   Procedure Laterality Date   • COLONOSCOPY     • CRANIOTOMY Right 3/23/2023    Procedure: Right frontal CRANIOTOMY IMAGE-GUIDED FOR TUMOR;  Surgeon: Norberto Braxton MD;  Location: [facility];  Service: Neurosurgery   • IR PORT PLACEMENT  4/27/2023   • [place] CYSTOURETHROSCOPY N/A 3/23/2023    Procedure: EUA, DEL CASTILLO INSERTION;  Surgeon: Kizzy Lizama MD;  Location: [facility];  Service: Urology   • PROSTATECTOMY  2001   • TONSILLECTOMY         Social History     Socioeconomic History   • Marital status: /Civil Union     Spouse name: None • Number of children: None   • Years of education: None   • Highest education level: None   Occupational History   • None   Tobacco Use   • Smoking status: Former     Packs/day: 1 00     Years: 15 00     Total pack years: 15 00     Types: Cigarettes     Quit date: 44     Years since quittin 5     Passive exposure: Never   • Smokeless tobacco: Never   • Tobacco comments:     i quit when i was 28  not sure of exact dates   Vaping Use   • Vaping Use: Never used   Substance and Sexual Activity   • Alcohol use: Not Currently     Alcohol/week: 6 0 standard drinks of alcohol     Types: 6 Cans of beer per week     Comment: socially   • Drug use: Not Currently     Types: Marijuana   • Sexual activity: Yes     Partners: Female     Birth control/protection: None   Other Topics Concern   • None   Social History Narrative   • None     Social Determinants of Health     Financial Resource Strain: Not on file   Food Insecurity: No Food Insecurity (3/20/2023)    Hunger Vital Sign    • Worried About Running Out of Food in the Last Year: Never true    • Ran Out of Food in the Last Year: Never true   Transportation Needs: No Transportation Needs (3/20/2023)    PRAPARE - Transportation    • Lack of Transportation (Medical): No    • Lack of Transportation (Non-Medical):  No   Physical Activity: Not on file   Stress: Not on file   Social Connections: Not on file   Intimate Partner Violence: Not on file   Housing Stability: Low Risk  (3/20/2023)    Housing Stability Vital Sign    • Unable to Pay for Housing in the Last Year: No    • Number of Places Lived in the Last Year: 1    • Unstable Housing in the Last Year: No       Family History   Problem Relation Age of Onset   • Dementia Mother            • Breast cancer Sister            • Prostate cancer Brother         Received Radiation       No Known Allergies      Current Outpatient Medications:   •  acetaminophen (TYLENOL) 325 mg tablet, Take 3 tablets (975 mg "total) by mouth every 8 (eight) hours as needed for mild pain or headaches, Disp: 30 tablet, Rfl: 0  •  amLODIPine (NORVASC) 10 mg tablet, Take 1 tablet (10 mg total) by mouth daily, Disp: 90 tablet, Rfl: 3  •  atorvastatin (LIPITOR) 40 mg tablet, Take 1 tablet (40 mg total) by mouth daily after dinner, Disp: 30 tablet, Rfl: 0  •  folic acid (KP Folic Acid) 1 mg tablet, Take 1 tablet (1 mg total) by mouth daily, Disp: 30 tablet, Rfl: 11  •  losartan (COZAAR) 50 mg tablet, Take 1 tablet (50 mg total) by mouth daily, Disp: 90 tablet, Rfl: 3  •  ondansetron (ZOFRAN) 8 mg tablet, Take 1 tablet (8 mg total) by mouth every 8 (eight) hours as needed for nausea or vomiting (Patient not taking: Reported on 5/16/2023), Disp: 20 tablet, Rfl: 3  No current facility-administered medications for this visit  Physical Exam:  /78 (BP Location: Right arm, Patient Position: Sitting, Cuff Size: Adult)   Pulse 93   Temp 97 8 °F (36 6 °C)   Resp 18   Ht 5' 9 02\" (1 753 m)   Wt 82 6 kg (182 lb)   SpO2 96%   BMI 26 86 kg/m²     Physical Exam  Vitals reviewed  Constitutional:       General: He is not in acute distress  Appearance: He is well-developed  He is not diaphoretic  HENT:      Head: Normocephalic and atraumatic  Eyes:      General: Lids are normal  No scleral icterus  Conjunctiva/sclera: Conjunctivae normal       Pupils: Pupils are equal, round, and reactive to light  Neck:      Thyroid: No thyromegaly  Cardiovascular:      Rate and Rhythm: Normal rate and regular rhythm  Heart sounds: Normal heart sounds  No murmur heard  Pulmonary:      Effort: Pulmonary effort is normal  No respiratory distress  Breath sounds: Decreased breath sounds present  Abdominal:      General: There is no distension  Palpations: Abdomen is soft  There is no hepatomegaly or splenomegaly  Tenderness: There is no abdominal tenderness  Hernia: A hernia is present     Musculoskeletal:         " General: Swelling (left hand and wrist) present  Normal range of motion  Cervical back: Normal range of motion and neck supple  Left lower leg: Left lower leg edema: not obvious d/t wearing compression stocking  Lymphadenopathy:      Cervical: No cervical adenopathy  Upper Body:      Right upper body: No axillary adenopathy  Left upper body: No axillary adenopathy  Skin:     General: Skin is warm and dry  Findings: Erythema (mild left hand) present  No rash  Neurological:      General: No focal deficit present  Mental Status: He is alert and oriented to person, place, and time  Psychiatric:         Mood and Affect: Mood and affect normal          Behavior: Behavior normal  Behavior is cooperative  Thought Content: Thought content normal          Judgment: Judgment normal            Labs:  Lab Results   Component Value Date    WBC 7 53 06/27/2023    HGB 11 2 (L) 06/27/2023    HCT 36 0 (L) 06/27/2023    MCV 91 06/27/2023     06/27/2023        Patient voiced understanding and agreement in the above discussion  Aware to contact our office with questions/symptoms in the interim  This note has been generated by voice recognition software system  Therefore, there may be spelling, grammar, and or syntax errors  Please contact if questions arise

## 2023-06-29 ENCOUNTER — TELEPHONE (OUTPATIENT)
Dept: HEMATOLOGY ONCOLOGY | Facility: CLINIC | Age: 68
End: 2023-06-29

## 2023-06-29 ENCOUNTER — HOSPITAL ENCOUNTER (OUTPATIENT)
Dept: INFUSION CENTER | Facility: HOSPITAL | Age: 68
End: 2023-06-29
Attending: INTERNAL MEDICINE
Payer: COMMERCIAL

## 2023-06-29 ENCOUNTER — OFFICE VISIT (OUTPATIENT)
Dept: LAB | Facility: HOSPITAL | Age: 68
End: 2023-06-29
Payer: COMMERCIAL

## 2023-06-29 VITALS
WEIGHT: 182.76 LBS | HEART RATE: 79 BPM | RESPIRATION RATE: 18 BRPM | SYSTOLIC BLOOD PRESSURE: 138 MMHG | DIASTOLIC BLOOD PRESSURE: 79 MMHG | TEMPERATURE: 97.6 F | OXYGEN SATURATION: 97 % | HEIGHT: 69 IN | BODY MASS INDEX: 27.07 KG/M2

## 2023-06-29 DIAGNOSIS — C34.91 CARCINOMA OF RIGHT LUNG (HCC): ICD-10-CM

## 2023-06-29 DIAGNOSIS — C79.9 METASTATIC ADENOCARCINOMA (HCC): Primary | ICD-10-CM

## 2023-06-29 DIAGNOSIS — R07.89 CHEST DISCOMFORT: ICD-10-CM

## 2023-06-29 DIAGNOSIS — D70.1 CHEMOTHERAPY-INDUCED NEUTROPENIA (HCC): ICD-10-CM

## 2023-06-29 DIAGNOSIS — R06.09 DYSPNEA ON EXERTION: ICD-10-CM

## 2023-06-29 DIAGNOSIS — T45.1X5A CHEMOTHERAPY-INDUCED NEUTROPENIA (HCC): ICD-10-CM

## 2023-06-29 PROCEDURE — 96411 CHEMO IV PUSH ADDL DRUG: CPT

## 2023-06-29 PROCEDURE — 96413 CHEMO IV INFUSION 1 HR: CPT

## 2023-06-29 PROCEDURE — 96372 THER/PROPH/DIAG INJ SC/IM: CPT

## 2023-06-29 PROCEDURE — 96367 TX/PROPH/DG ADDL SEQ IV INF: CPT

## 2023-06-29 PROCEDURE — 93005 ELECTROCARDIOGRAM TRACING: CPT

## 2023-06-29 PROCEDURE — 96377 APPLICATON ON-BODY INJECTOR: CPT

## 2023-06-29 PROCEDURE — 96417 CHEMO IV INFUS EACH ADDL SEQ: CPT

## 2023-06-29 RX ORDER — CYANOCOBALAMIN 1000 UG/ML
1000 INJECTION, SOLUTION INTRAMUSCULAR; SUBCUTANEOUS ONCE
Status: COMPLETED | OUTPATIENT
Start: 2023-06-29 | End: 2023-06-29

## 2023-06-29 RX ORDER — SODIUM CHLORIDE 9 MG/ML
20 INJECTION, SOLUTION INTRAVENOUS ONCE
Status: COMPLETED | OUTPATIENT
Start: 2023-06-29 | End: 2023-06-29

## 2023-06-29 RX ADMIN — DEXAMETHASONE SODIUM PHOSPHATE: 10 INJECTION, SOLUTION INTRAMUSCULAR; INTRAVENOUS at 08:45

## 2023-06-29 RX ADMIN — CARBOPLATIN 642.5 MG: 10 INJECTION, SOLUTION INTRAVENOUS at 10:41

## 2023-06-29 RX ADMIN — PEGFILGRASTIM 6 MG: KIT SUBCUTANEOUS at 11:45

## 2023-06-29 RX ADMIN — SODIUM CHLORIDE 20 ML/HR: 0.9 INJECTION, SOLUTION INTRAVENOUS at 08:43

## 2023-06-29 RX ADMIN — CYANOCOBALAMIN 1000 MCG: 1000 INJECTION, SOLUTION INTRAMUSCULAR at 09:08

## 2023-06-29 RX ADMIN — FOSAPREPITANT 150 MG: 150 INJECTION, POWDER, LYOPHILIZED, FOR SOLUTION INTRAVENOUS at 09:08

## 2023-06-29 RX ADMIN — SODIUM CHLORIDE 1000 MG: 9 INJECTION, SOLUTION INTRAVENOUS at 10:27

## 2023-06-29 RX ADMIN — SODIUM CHLORIDE 360 MG: 9 INJECTION, SOLUTION INTRAVENOUS at 09:44

## 2023-06-29 NOTE — TELEPHONE ENCOUNTER
Phoned [facility] infusion and spoke to Lyudmila to ask inf staff to let pt know that his Ct of CTA did not reveal anything to explain his systems so pt was asked to have an EKG orders are in system

## 2023-06-29 NOTE — PROGRESS NOTES
Labs reviewed  Chemotherapy tx tolerated without incident  Port flushed per protocol  Neulasta OnPro applied to L arm & blinking green  AVS provided  Discharged in stable condition

## 2023-06-30 LAB
ATRIAL RATE: 66 BPM
P AXIS: 28 DEGREES
PR INTERVAL: 214 MS
QRS AXIS: -33 DEGREES
QRSD INTERVAL: 88 MS
QT INTERVAL: 416 MS
QTC INTERVAL: 436 MS
T WAVE AXIS: -4 DEGREES
VENTRICULAR RATE: 66 BPM

## 2023-07-05 ENCOUNTER — RADIATION ONCOLOGY FOLLOW-UP (OUTPATIENT)
Dept: RADIATION ONCOLOGY | Facility: HOSPITAL | Age: 68
End: 2023-07-05
Attending: STUDENT IN AN ORGANIZED HEALTH CARE EDUCATION/TRAINING PROGRAM
Payer: COMMERCIAL

## 2023-07-05 VITALS
HEART RATE: 90 BPM | HEIGHT: 69 IN | OXYGEN SATURATION: 97 % | WEIGHT: 180.2 LBS | SYSTOLIC BLOOD PRESSURE: 140 MMHG | TEMPERATURE: 98.9 F | DIASTOLIC BLOOD PRESSURE: 80 MMHG | BODY MASS INDEX: 26.69 KG/M2 | RESPIRATION RATE: 18 BRPM

## 2023-07-05 DIAGNOSIS — C34.91 CARCINOMA OF RIGHT LUNG (HCC): Primary | ICD-10-CM

## 2023-07-05 DIAGNOSIS — C79.31 METASTASIS TO BRAIN (HCC): Primary | ICD-10-CM

## 2023-07-05 PROCEDURE — G0463 HOSPITAL OUTPT CLINIC VISIT: HCPCS | Performed by: STUDENT IN AN ORGANIZED HEALTH CARE EDUCATION/TRAINING PROGRAM

## 2023-07-05 PROCEDURE — 99024 POSTOP FOLLOW-UP VISIT: CPT | Performed by: STUDENT IN AN ORGANIZED HEALTH CARE EDUCATION/TRAINING PROGRAM

## 2023-07-05 PROCEDURE — 99211 OFF/OP EST MAY X REQ PHY/QHP: CPT | Performed by: STUDENT IN AN ORGANIZED HEALTH CARE EDUCATION/TRAINING PROGRAM

## 2023-07-05 NOTE — PROGRESS NOTES
Tara Noguera 1955 is a 77 y.o. male  with Stage JAY ([id number]) lung adenocarcinoma s/p resection of a right frontal metastasis. On 4/28/23 he completed a course of postoperative SRT to a dose of 3000cGy in 5 fractions. He tolerated SRT well overall without any significant adverse effect. He returns today for his first follow-up. 4/28/23 PET scan-  1. FDG avid right upper lobe mass in keeping with primary bronchogenic carcinoma of the lung  2. FDG avid multifocal right hilar adenopathy  3. Trace nonspecific activity within a right paratracheal lymph node with short axis diameter 0.9 cm.  4. No evidence of FDG avid metastatic disease within the neck, abdomen, pelvis, or skeleton    5/8/23 Dr. Rajeev Rodriguez- S/p right frontal craniotomy for perirolandic mass, 3/23/2023. Recovering well. One area of scabbing on incision, instructed him to apply triple antibiotic ointment. F/u as needed    5/10/23 Dr. Aamir Dunbar- Reviewed imaging. Clinically looks like T3N1 right upper lobe cancer. I think it is reasonable to consider this with local regional lymph node metastasis and oligometastatic disease. Recommend induction chemotherapy followed by repeat PET and brain MRI. If no other evidence of distant disease I would entertain right upper lobectomy and lymph node dissection at that time    5/16/23 Dr. Ny Mckeon for carboplatin/Alimta and nivolumab which will be given on every 3-week basis for 3 cycles followed by a PET CT scan. He will then be seen by thoracic surgery to see if he is still a surgical candidate vs pursuing concurrent chemoradiation    5/18/23 started chemo    6/16/23 MRI brain-   1. Evolving/resorbed blood products and diminishing/near completely resolved vasogenic edema around the right frontal treatment cavity which demonstrates a small amount of surrounding curvilinear smooth enhancement possibly reactive. Continued clinical   and imaging surveillance advised. 2. No new or additional brain metastases.   3. No acute infarction, intracranial hemorrhage. 4. Mild, chronic microangiopathy. 23 Jesus Packer NP- Due for cycle 3 23. Tolerating treatment with manageable side effects. Will order PET to be done before next visit in 3 weeks. He reports dyspnea with exertion and cough since starting treatment, also chest discomfort when taking a deep breath. STAT CTA of the chest to r/o PE    23 CTA chest pe study-  No pulmonary embolus. No acute pulmonary disease. Redemonstration of right upper lobe mass and slightly enlarged metastatic right hilar nodes. Upcomin23 PET scan  23 Dr. Bree Cason    Oncology History   Metastatic adenocarcinoma [facility])    Initial Diagnosis    Metastatic adenocarcinoma ([address])     3/23/2023 Biopsy    Brain, right frontal mass (biopsy):  - Metastatic non-small cell carcinoma     Comment:  - Tumor cells stain diffusely for CAM5.2, CKAE1/3, CK7, TTF1 and NapsinA with absent CK20, p63, CK5/6, p40 expression. This immunopanel favors a metastatic lung adenocarcinoma.        2023 -  Chemotherapy    cyanocobalamin, 1,000 mcg, Intramuscular, Once, 2 of 4 cycles  Administration: 1,000 mcg (2023), 1,000 mcg (2023)  alteplase (CATHFLO), 2 mg, Intracatheter, Every 1 Minute as needed, 3 of 6 cycles  pegfilgrastim (Aretta Avery), 6 mg, Subcutaneous, Once, 2 of 2 cycles  Administration: 6 mg (2023), 6 mg (2023)  fosaprepitant (EMEND) IVPB, 150 mg, Intravenous, Once, 3 of 6 cycles  Administration: 150 mg (2023), 150 mg (2023), 150 mg (2023)  nivolumab (OPDIVO) IVPB, 360 mg (150 % of original dose 240 mg), Intravenous, Once, 3 of 3 cycles  Dose modification: 360 mg (original dose 240 mg, Cycle 1, Reason: Dose modified as per discussion with consulting physician)  Administration: 360 mg (2023), 360 mg (2023), 360 mg (2023)  CARBOplatin (PARAPLATIN) IVPB ([facility] AUC DOSING), 642.5 mg, Intravenous, Once, 3 of 6 cycles  Administration: 642.5 mg (5/18/2023), 642.5 mg (6/29/2023), 650 mg (6/8/2023)  pemetrexed (ALIMTA) chemo infusion, 980 mg, Intravenous, Once, 3 of 6 cycles  Administration: 1,000 mg (5/18/2023), 1,000 mg (6/29/2023), 1,000 mg (6/8/2023)     Carcinoma of right lung ([address])   4/13/2023 Initial Diagnosis    Carcinoma of right lung ([address])     4/13/2023 -  Cancer Staged    Staging form: Lung, AJCC 8th Edition  - Clinical: Stage JAY (cT3, cN1, cM1b) - Signed by Kenisha Medeiros MD on 4/13/2023 4/19/2023 - 4/28/2023 Radiation    Plan ID Energy Fractions Dose per Fraction (cGy) Dose Correction (cGy) Total Dose Delivered (cGy) Elapsed Days   SRT R Frontal 6X-FFF 5 / 5 600 0 3,000 9         5/18/2023 -  Chemotherapy    cyanocobalamin, 1,000 mcg, Intramuscular, Once, 2 of 4 cycles  Administration: 1,000 mcg (6/29/2023), 1,000 mcg (5/18/2023)  alteplase (CATHFLO), 2 mg, Intracatheter, Every 1 Minute as needed, 3 of 6 cycles  pegfilgrastim (Clayborne Lux), 6 mg, Subcutaneous, Once, 2 of 2 cycles  Administration: 6 mg (6/8/2023), 6 mg (6/29/2023)  fosaprepitant (EMEND) IVPB, 150 mg, Intravenous, Once, 3 of 6 cycles  Administration: 150 mg (5/18/2023), 150 mg (6/29/2023), 150 mg (6/8/2023)  nivolumab (OPDIVO) IVPB, 360 mg (150 % of original dose 240 mg), Intravenous, Once, 3 of 3 cycles  Dose modification: 360 mg (original dose 240 mg, Cycle 1, Reason: Dose modified as per discussion with consulting physician)  Administration: 360 mg (5/18/2023), 360 mg (6/8/2023), 360 mg (6/29/2023)  CARBOplatin (PARAPLATIN) IVPB ([facility] AUC DOSING), 642.5 mg, Intravenous, Once, 3 of 6 cycles  Administration: 642.5 mg (5/18/2023), 642.5 mg (6/29/2023), 650 mg (6/8/2023)  pemetrexed (ALIMTA) chemo infusion, 980 mg, Intravenous, Once, 3 of 6 cycles  Administration: 1,000 mg (5/18/2023), 1,000 mg (6/29/2023), 1,000 mg (6/8/2023)         Review of Systems:  Review of Systems   Constitutional: Positive for activity change and fatigue. HENT: Negative.     Eyes: Wears glasses   Respiratory: Positive for cough (Intermittent, with occasional phlegm production) and shortness of breath (On exertion). Cardiovascular: Negative. Gastrointestinal: Negative. Endocrine: Negative. Genitourinary: Negative. Musculoskeletal: Negative. Skin: Negative. Allergic/Immunologic: Negative. Neurological: Positive for dizziness, light-headedness, numbness (Right pink finger with numbness since surgery 3/19) and headaches (Occasional, relieved by tylenol). Hematological: Negative. Psychiatric/Behavioral: Negative. Clinical Trial: no      Health Maintenance   Topic Date Due   • Hepatitis A Vaccine (1 of 2 - Risk 2-dose series) Never done   • Hepatitis B Vaccine (1 of 3 - Risk 3-dose series) Never done   • COVID-19 Vaccine (3 - Pfizer risk series) 05/14/2021   • BMI: Followup Plan  03/21/2023   • Influenza Vaccine (1) 09/01/2023   • Annual Physical  09/22/2023   • Fall Risk  04/18/2024   • BMI: Adult  06/29/2024   • Depression Screening  07/05/2024   • DTaP,Tdap,and Td Vaccines (2 - Td or Tdap) 04/26/2028   • Colorectal Cancer Screening  08/10/2031   • Hepatitis C Screening  Completed   • Pneumococcal Vaccine: 65+ Years  Completed   • HIB Vaccine  Aged Out   • IPV Vaccine  Aged Out   • Meningococcal ACWY Vaccine  Aged Out   • HPV Vaccine  Aged Out     Patient Active Problem List   Diagnosis   • Negative depression screening   • Overweight (BMI 25.0-29. 9)   • Essential hypertension   • Annual physical exam   • CVA (cerebral vascular accident) ([address])   • Hypercholesterolemia   • Brain mass   • Brain compression (HCC)   • Cerebral edema (HCC)   • Lung nodule   • Metastatic adenocarcinoma ([address])   • Carcinoma of right lung ([address])   • Encounter for central line care   • Chemotherapy-induced neutropenia (HCC)   • History of CVA (cerebrovascular accident)     Past Medical History:   Diagnosis Date   • Cancer ([address]) 2001    Receint lung and brain lesions and prostate cancer in 2001   • Hypertension    • Prostate cancer ([address] )    • Rectal bleeding    • Stroke [facility])            Past Surgical History:   Procedure Laterality Date   • COLONOSCOPY     • CRANIOTOMY Right 3/23/2023    Procedure: Right frontal CRANIOTOMY IMAGE-GUIDED FOR TUMOR;  Surgeon: Ann Headley MD;  Location: [facility];  Service: Neurosurgery   • IR PORT PLACEMENT  2023   • [place] CYSTOURETHROSCOPY N/A 3/23/2023    Procedure: EUA, DEL CASTILLO INSERTION;  Surgeon: Leopoldo Counter, MD;  Location: [facility];  Service: Urology   • PROSTATECTOMY     • TONSILLECTOMY       Family History   Problem Relation Age of Onset   • Dementia Mother            • Breast cancer Sister            • Prostate cancer Brother         Received Radiation     Social History     Socioeconomic History   • Marital status: /Civil Union     Spouse name: Not on file   • Number of children: Not on file   • Years of education: Not on file   • Highest education level: Not on file   Occupational History   • Not on file   Tobacco Use   • Smoking status: Former     Packs/day: 1.00     Years: 15.00     Total pack years: 15.00     Types: Cigarettes     Quit date: 44     Years since quittin.5     Passive exposure: Never   • Smokeless tobacco: Never   • Tobacco comments:     i quit when i was 28. not sure of exact dates   Vaping Use   • Vaping Use: Never used   Substance and Sexual Activity   • Alcohol use: Not Currently     Alcohol/week: 6.0 standard drinks of alcohol     Types: 6 Cans of beer per week     Comment: socially   • Drug use: Not Currently     Types: Marijuana   • Sexual activity: Yes     Partners: Female     Birth control/protection: None   Other Topics Concern   • Not on file   Social History Narrative   • Not on file     Social Determinants of Health     Financial Resource Strain: Not on file   Food Insecurity: No Food Insecurity (3/20/2023)    Hunger Vital Sign    • Worried About Running Out of Food in the Last Year: Never true    • Ran Out of Food in the Last Year: Never true   Transportation Needs: No Transportation Needs (3/20/2023)    PRAPARE - Transportation    • Lack of Transportation (Medical): No    • Lack of Transportation (Non-Medical):  No   Physical Activity: Not on file   Stress: Not on file   Social Connections: Not on file   Intimate Partner Violence: Not on file   Housing Stability: Low Risk  (3/20/2023)    Housing Stability Vital Sign    • Unable to Pay for Housing in the Last Year: No    • Number of Places Lived in the Last Year: 1    • Unstable Housing in the Last Year: No       Current Outpatient Medications:   •  acetaminophen (TYLENOL) 325 mg tablet, Take 3 tablets (975 mg total) by mouth every 8 (eight) hours as needed for mild pain or headaches, Disp: 30 tablet, Rfl: 0  •  amLODIPine (NORVASC) 10 mg tablet, Take 1 tablet (10 mg total) by mouth daily, Disp: 90 tablet, Rfl: 3  •  atorvastatin (LIPITOR) 40 mg tablet, Take 1 tablet (40 mg total) by mouth daily after dinner, Disp: 30 tablet, Rfl: 0  •  folic acid (KP Folic Acid) 1 mg tablet, Take 1 tablet (1 mg total) by mouth daily, Disp: 30 tablet, Rfl: 11  •  losartan (COZAAR) 50 mg tablet, Take 1 tablet (50 mg total) by mouth daily, Disp: 90 tablet, Rfl: 3  •  Magnesium Oxide, Elemental, 400 MG TABS, Take 400 mg by mouth in the morning, Disp: 30 tablet, Rfl: 6  •  ondansetron (ZOFRAN) 8 mg tablet, Take 1 tablet (8 mg total) by mouth every 8 (eight) hours as needed for nausea or vomiting, Disp: 20 tablet, Rfl: 3  No Known Allergies  Vitals:    07/05/23 1411   BP: 140/80   BP Location: Left arm   Patient Position: Sitting   Cuff Size: Standard   Pulse: 90   Resp: 18   Temp: 98.9 °F (37.2 °C)   TempSrc: Temporal   SpO2: 97%   Weight: 81.7 kg (180 lb 3.2 oz)   Height: 5' 9" (1.753 m)      Pain Score: 0-No pain

## 2023-07-06 ENCOUNTER — HOSPITAL ENCOUNTER (OUTPATIENT)
Dept: NON INVASIVE DIAGNOSTICS | Facility: HOSPITAL | Age: 68
Discharge: HOME | End: 2023-07-06
Attending: INTERNAL MEDICINE
Payer: COMMERCIAL

## 2023-07-06 DIAGNOSIS — M79.89 LEG SWELLING: ICD-10-CM

## 2023-07-06 DIAGNOSIS — M79.89 LEFT ARM SWELLING: ICD-10-CM

## 2023-07-06 PROCEDURE — 93971 EXTREMITY STUDY: CPT

## 2023-07-06 PROCEDURE — 93971 EXTREMITY STUDY: CPT | Performed by: SURGERY

## 2023-07-06 PROCEDURE — 93970 EXTREMITY STUDY: CPT | Performed by: SURGERY

## 2023-07-06 PROCEDURE — 93970 EXTREMITY STUDY: CPT

## 2023-07-06 NOTE — PROGRESS NOTES
Follow-up - Radiation Oncology   Dona Oliveros 1955 77 y.o. male [id number]      History of Present Illness   Cancer Staging   Carcinoma of right lung [facility])  Staging form: Lung, AJCC 8th Edition  - Clinical: Stage JAY (cT3, cN1, cM1b) - Signed by Rebecca Lau MD on 4/13/2023      Mr. Dona Oliveros is a 77year old man with Stage JAY ([id number]) lung adenocarcinoma s/p resection of a right frontal metastasis. On 4/28/23 he completed a course of postoperative SRT to a dose of 3000cGy in 5 fractions. He returns today for follow-up. Interval History:   The patient was last seen in clinic on 4/28/23 at the completion of SRT. He tolerated treatment well overall without any significant adverse effect. Since completion of SRT, the patient has undergone PET/CT, which demonstrated a hypermetabolic RUL mass with associated hilar adenopathy but no other sites of metastatic disease. He has seen Dr. Nohemi Galvin with plan to start chemotherapy followed by restaging and assessment for resection. The patient has started chemotherapy with carbo/pemetrexed/nivolumab under the care of Dr. Marlyn Taylor. He is scheduled for PET/CT on 7/18/23.     6/16/23 MRI brain-   1. Evolving/resorbed blood products and diminishing/near completely resolved vasogenic edema around the right frontal treatment cavity which demonstrates a small amount of surrounding curvilinear smooth enhancement possibly reactive. Continued clinical   and imaging surveillance advised. 2. No new or additional brain metastases. 3. No acute infarction, intracranial hemorrhage. 4. Mild, chronic microangiopathy. Currently he is feeling well overall. He is stable symptomatically from a neurologic standpoint though has had increased fatigue and difficulty secondary to systemic therapy.        Historical Information   Oncology History   Metastatic adenocarcinoma ([address])   2023 Initial Diagnosis    Metastatic adenocarcinoma ([address])     3/23/2023 Biopsy    Brain, right frontal mass (biopsy):  - Metastatic non-small cell carcinoma     Comment:  - Tumor cells stain diffusely for CAM5.2, CKAE1/3, CK7, TTF1 and NapsinA with absent CK20, p63, CK5/6, p40 expression. This immunopanel favors a metastatic lung adenocarcinoma.        5/18/2023 -  Chemotherapy    cyanocobalamin, 1,000 mcg, Intramuscular, Once, 2 of 4 cycles  Administration: 1,000 mcg (6/29/2023), 1,000 mcg (5/18/2023)  alteplase (CATHFLO), 2 mg, Intracatheter, Every 1 Minute as needed, 3 of 6 cycles  pegfilgrastim (Ming Sell), 6 mg, Subcutaneous, Once, 2 of 2 cycles  Administration: 6 mg (6/8/2023), 6 mg (6/29/2023)  fosaprepitant (EMEND) IVPB, 150 mg, Intravenous, Once, 3 of 6 cycles  Administration: 150 mg (5/18/2023), 150 mg (6/29/2023), 150 mg (6/8/2023)  nivolumab (OPDIVO) IVPB, 360 mg (150 % of original dose 240 mg), Intravenous, Once, 3 of 3 cycles  Dose modification: 360 mg (original dose 240 mg, Cycle 1, Reason: Dose modified as per discussion with consulting physician)  Administration: 360 mg (5/18/2023), 360 mg (6/8/2023), 360 mg (6/29/2023)  CARBOplatin (PARAPLATIN) IVPB (GOG AUC DOSING), 642.5 mg, Intravenous, Once, 3 of 6 cycles  Administration: 642.5 mg (5/18/2023), 642.5 mg (6/29/2023), 650 mg (6/8/2023)  pemetrexed (ALIMTA) chemo infusion, 980 mg, Intravenous, Once, 3 of 6 cycles  Administration: 1,000 mg (5/18/2023), 1,000 mg (6/29/2023), 1,000 mg (6/8/2023)     Carcinoma of right lung ([address])   4/13/2023 Initial Diagnosis    Carcinoma of right lung ([address])     4/13/2023 -  Cancer Staged    Staging form: Lung, AJCC 8th Edition  - Clinical: Stage JAY (cT3, cN1, cM1b) - Signed by Malika Pinto MD on 4/13/2023 4/19/2023 - 4/28/2023 Radiation    Plan ID Energy Fractions Dose per Fraction (cGy) Dose Correction (cGy) Total Dose Delivered (cGy) Elapsed Days   SRT R Frontal 6X-FFF 5 / 5 600 0 3,000 9         5/18/2023 -  Chemotherapy    cyanocobalamin, 1,000 mcg, Intramuscular, Once, 2 of 4 cycles  Administration: 1,000 mcg (6/29/2023), 1,000 mcg (5/18/2023)  alteplase (CATHFLO), 2 mg, Intracatheter, Every 1 Minute as needed, 3 of 6 cycles  pegfilgrastim (Berenda Sheer), 6 mg, Subcutaneous, Once, 2 of 2 cycles  Administration: 6 mg (6/8/2023), 6 mg (6/29/2023)  fosaprepitant (EMEND) IVPB, 150 mg, Intravenous, Once, 3 of 6 cycles  Administration: 150 mg (5/18/2023), 150 mg (6/29/2023), 150 mg (6/8/2023)  nivolumab (OPDIVO) IVPB, 360 mg (150 % of original dose 240 mg), Intravenous, Once, 3 of 3 cycles  Dose modification: 360 mg (original dose 240 mg, Cycle 1, Reason: Dose modified as per discussion with consulting physician)  Administration: 360 mg (5/18/2023), 360 mg (6/8/2023), 360 mg (6/29/2023)  CARBOplatin (PARAPLATIN) IVPB (GOG AUC DOSING), 642.5 mg, Intravenous, Once, 3 of 6 cycles  Administration: 642.5 mg (5/18/2023), 642.5 mg (6/29/2023), 650 mg (6/8/2023)  pemetrexed (ALIMTA) chemo infusion, 980 mg, Intravenous, Once, 3 of 6 cycles  Administration: 1,000 mg (5/18/2023), 1,000 mg (6/29/2023), 1,000 mg (6/8/2023)         Past Medical History:   Diagnosis Date   • Cancer ([address]) 2001    Receint lung and brain lesions and prostate cancer in 2001   • Hypertension    • Prostate cancer ([address]) 2001   • Rectal bleeding    • Stroke [facility])     2011       Past Surgical History:   Procedure Laterality Date   • COLONOSCOPY     • CRANIOTOMY Right 3/23/2023    Procedure: Right frontal CRANIOTOMY IMAGE-GUIDED FOR TUMOR;  Surgeon: Sada Thomas MD;  Location: [facility];  Service: Neurosurgery   • IR PORT PLACEMENT  4/27/2023   • AK CYSTOURETHROSCOPY N/A 3/23/2023    Procedure: EUA, DEL CASTILLO INSERTION;  Surgeon: Tay Gifford MD;  Location: [facility];  Service: Urology   • PROSTATECTOMY  2001   • TONSILLECTOMY         Social History   Social History     Substance and Sexual Activity   Alcohol Use Not Currently   • Alcohol/week: 6.0 standard drinks of alcohol   • Types: 6 Cans of beer per week    Comment: socially Social History     Substance and Sexual Activity   Drug Use Not Currently   • Types: Marijuana     Social History     Tobacco Use   Smoking Status Former   • Packs/day: 1.00   • Years: 15.00   • Total pack years: 15.00   • Types: Cigarettes   • Quit date: 44   • Years since quittin.5   • Passive exposure: Never   Smokeless Tobacco Never   Tobacco Comments    i quit when i was 28. not sure of exact dates         Meds/Allergies     Current Outpatient Medications:   •  acetaminophen (TYLENOL) 325 mg tablet, Take 3 tablets (975 mg total) by mouth every 8 (eight) hours as needed for mild pain or headaches, Disp: 30 tablet, Rfl: 0  •  amLODIPine (NORVASC) 10 mg tablet, Take 1 tablet (10 mg total) by mouth daily, Disp: 90 tablet, Rfl: 3  •  atorvastatin (LIPITOR) 40 mg tablet, Take 1 tablet (40 mg total) by mouth daily after dinner, Disp: 30 tablet, Rfl: 0  •  folic acid (KP Folic Acid) 1 mg tablet, Take 1 tablet (1 mg total) by mouth daily, Disp: 30 tablet, Rfl: 11  •  losartan (COZAAR) 50 mg tablet, Take 1 tablet (50 mg total) by mouth daily, Disp: 90 tablet, Rfl: 3  •  Magnesium Oxide, Elemental, 400 MG TABS, Take 400 mg by mouth in the morning, Disp: 30 tablet, Rfl: 6  •  ondansetron (ZOFRAN) 8 mg tablet, Take 1 tablet (8 mg total) by mouth every 8 (eight) hours as needed for nausea or vomiting, Disp: 20 tablet, Rfl: 3  No Known Allergies      Review of Systems   Constitutional: Positive for activity change and fatigue. HENT: Negative. Eyes:        Wears glasses   Respiratory: Positive for cough (Intermittent, with occasional phlegm production) and shortness of breath (On exertion). Cardiovascular: Negative. Gastrointestinal: Negative. Endocrine: Negative. Genitourinary: Negative. Musculoskeletal: Negative. Skin: Negative. Allergic/Immunologic: Negative.     Neurological: Positive for dizziness, light-headedness, numbness (Right pink finger with numbness since surgery 3/19) and headaches (Occasional, relieved by tylenol). Hematological: Negative. Psychiatric/Behavioral: Negative.            OBJECTIVE:   /80 (BP Location: Left arm, Patient Position: Sitting, Cuff Size: Standard)   Pulse 90   Temp 98.9 °F (37.2 °C) (Temporal)   Resp 18   Ht 5' 9" (1.753 m)   Wt 81.7 kg (180 lb 3.2 oz)   SpO2 97%   BMI 26.61 kg/m²   Pain Assessment:  0  ECOG/Zubrod/WHO: 1 - Symptomatic but completely ambulatory    Physical Exam   Well appearing. NAD. No increased work of breathing. Extremities warm and well perfused. No overt neurologic deficits.      RESULTS    Lab Results:   Recent Results (from the past 672 hour(s))   Magnesium    Collection Time: 06/08/23 10:43 AM   Result Value Ref Range    Magnesium 2.2 1.9 - 2.7 mg/dL   CBC and differential    Collection Time: 06/27/23  8:43 AM   Result Value Ref Range    WBC 7.53 4.31 - 10.16 Thousand/uL    RBC 3.94 3.88 - 5.62 Million/uL    Hemoglobin 11.2 (L) 12.0 - 17.0 g/dL    Hematocrit 36.0 (L) 36.5 - 49.3 %    MCV 91 82 - 98 fL    MCH 28.4 26.8 - 34.3 pg    MCHC 31.1 (L) 31.4 - 37.4 g/dL    RDW 15.2 (H) 11.6 - 15.1 %    MPV 9.2 8.9 - 12.7 fL    Platelets 906 648 - 155 Thousands/uL    nRBC 0 /100 WBCs    Neutrophils Relative 69 43 - 75 %    Immat GRANS % 2 0 - 2 %    Lymphocytes Relative 19 14 - 44 %    Monocytes Relative 8 4 - 12 %    Eosinophils Relative 1 0 - 6 %    Basophils Relative 1 0 - 1 %    Neutrophils Absolute 5.20 1.85 - 7.62 Thousands/µL    Immature Grans Absolute 0.15 0.00 - 0.20 Thousand/uL    Lymphocytes Absolute 1.39 0.60 - 4.47 Thousands/µL    Monocytes Absolute 0.60 0.17 - 1.22 Thousand/µL    Eosinophils Absolute 0.10 0.00 - 0.61 Thousand/µL    Basophils Absolute 0.09 0.00 - 0.10 Thousands/µL   Comprehensive metabolic panel    Collection Time: 06/27/23  8:43 AM   Result Value Ref Range    Sodium 139 135 - 147 mmol/L    Potassium 3.7 3.5 - 5.3 mmol/L    Chloride 103 96 - 108 mmol/L    CO2 28 21 - 32 mmol/L    ANION GAP 8 mmol/L    BUN 10 5 - 25 mg/dL    Creatinine 0.69 0.60 - 1.30 mg/dL    Glucose 116 65 - 140 mg/dL    Glucose, Fasting 116 (H) 65 - 99 mg/dL    Calcium 9.2 8.4 - 10.2 mg/dL    AST 15 13 - 39 U/L    ALT 13 7 - 52 U/L    Alkaline Phosphatase 81 34 - 104 U/L    Total Protein 7.3 6.4 - 8.4 g/dL    Albumin 4.0 3.5 - 5.0 g/dL    Total Bilirubin 0.56 0.20 - 1.00 mg/dL    eGFR 96 ml/min/1.73sq m   Magnesium    Collection Time: 06/27/23  8:43 AM   Result Value Ref Range    Magnesium 1.8 (L) 1.9 - 2.7 mg/dL   TSH, 3rd generation with Free T4 reflex    Collection Time: 06/27/23  8:43 AM   Result Value Ref Range    TSH 3RD GENERATON 2.831 0.450 - 4.500 uIU/mL   Lipid Panel with Direct LDL reflex    Collection Time: 06/27/23  8:43 AM   Result Value Ref Range    Cholesterol 96 See Comment mg/dL    Triglycerides 75 See Comment mg/dL    HDL, Direct 31 (L) >=40 mg/dL    LDL Calculated 50 0 - 100 mg/dL   T3, free    Collection Time: 06/27/23  8:43 AM   Result Value Ref Range    T3, Free 3.50 2.50 - 3.90 pg/mL   Amylase    Collection Time: 06/27/23  8:43 AM   Result Value Ref Range    Amylase 27 (L) 29 - 103 IU/L   Lipase    Collection Time: 06/27/23  8:43 AM   Result Value Ref Range    Lipase 24 11 - 82 u/L   LD,Blood    Collection Time: 06/27/23  8:43 AM   Result Value Ref Range     140 - 271 U/L   Uric acid    Collection Time: 06/27/23  8:43 AM   Result Value Ref Range    Uric Acid 5.0 3.5 - 8.5 mg/dL   ECG 12 lead    Collection Time: 06/29/23 12:26 PM   Result Value Ref Range    Ventricular Rate 66 BPM    Atrial Rate 66 BPM    UT Interval 214 ms    QRSD Interval 88 ms    QT Interval 416 ms    QTC Interval 436 ms    P Axis 28 degrees    QRS Axis -33 degrees    T Wave Axis -4 degrees       Imaging Studies:CTA chest pe study    Result Date: 6/27/2023  Narrative: CTA - CHEST WITH IV CONTRAST - PULMONARY ANGIOGRAM INDICATION:   R07.89: Other chest pain R06.09: Other forms of dyspnea R05.8: Other specified cough.  "New pain with inspiration also reports cough/dyspnea with exertion- hx NSCLC met to brain on chemo- R/O PE or other acute significant findings." COMPARISON: PET/CT 4/28/2023, chest CT 3/20/2023. TECHNIQUE: CT angiogram timed for optimal opacification of the pulmonary arteries. Axial, sagittal, and coronal 2D reformats created from source data. Coronal 3D MIP postprocessing on the acquisition scanner. Radiation dose length product (DLP):  445.11 mGy-cm . Radiation dose exposure minimized using iterative reconstruction and automated exposure control. IV Contrast:  60 mL of iohexol (OMNIPAQUE) FINDINGS: PULMONARY ARTERIES:  No pulmonary embolus. LUNGS: Stable 3.6 cm solid lobulated lateral right upper lobe mass abutting the pleura. AIRWAYS: No significant filling defects. PLEURA:  Unremarkable. HEART/GREAT VESSELS: Normal heart size. Mild coronary artery calcification indicating atherosclerotic heart disease. MEDIASTINUM AND ROSEMARY: Redemonstration of slightly enlarged right hilar nodes which were FDG avid on PET. Right port at cavoatrial junction. CHEST WALL AND LOWER NECK: Mild gynecomastia. UPPER ABDOMEN: Hepatic steatosis. Likely a few small flash filling hepatic hemangiomas. Right renal cyst. OSSEOUS STRUCTURES: Mild degenerative disease in the spine. Impression: No pulmonary embolus. No acute pulmonary disease. Redemonstration of right upper lobe mass and slightly enlarged metastatic right hilar nodes. Workstation performed: [id number]     MRI brain w wo contrast    Result Date: 6/26/2023  Narrative: MRI BRAIN WITH AND WITHOUT CONTRAST INDICATION: C79.31: Secondary malignant neoplasm of brain. Status post craniotomy and stereotactic radiotherapy. History of brain metastasis. Follow-up evaluation. COMPARISON: 4/6/2023 TECHNIQUE: Multiplanar, multisequence imaging of the brain was performed before and after gadolinium administration. IV Contrast:  8 mL of Gadobutrol injection (SINGLE-DOSE) IMAGE QUALITY:   Diagnostic. FINDINGS: BRAIN PARENCHYMA: T1 hyperintense material/blood products in the right frontal treatment bed have resolved. There is a small amount of residual curvilinear enhancement along the margins of the treatment cavity without focal nodularity, seen on series 9 image 21. Surrounding vasogenic edema is significantly diminished/near completely resolved with a small amount of residual edema/FLAIR envelope noted. There is no new or additional enhancing abnormality. Small scattered hyperintensities on T2/FLAIR imaging are noted in the periventricular and subcortical white matter demonstrating an appearance that is statistically most likely to represent mild microangiopathic change. There is no diffusion restriction. No acute intracranial hemorrhage. No extra-axial fluid collection. Cerebellar tonsils normally positioned. VENTRICLES:  Normal for the patient's age. SELLA AND PITUITARY GLAND:  Normal. ORBITS:  Normal. PARANASAL SINUSES: Mild bilateral maxillary sinus mucosal thickening. VASCULATURE:  Evaluation of the major intracranial vasculature demonstrates appropriate flow voids. CALVARIUM AND SKULL BASE: Right frontal craniotomy superior laterally towards the vertex redemonstrated. EXTRACRANIAL SOFT TISSUES:  Normal.     Impression: 1. Evolving/resorbed blood products and diminishing/near completely resolved vasogenic edema around the right frontal treatment cavity which demonstrates a small amount of surrounding curvilinear smooth enhancement possibly reactive. Continued clinical and imaging surveillance advised. 2. No new or additional brain metastases. 3. No acute infarction, intracranial hemorrhage. 4. Mild, chronic microangiopathy. Workstation performed: [id number]           Assessment/Plan:  Orders Placed This Encounter   Procedures   • MRI Brain BT w wo Contrast      We reviewed the patient's recent MRI Brain in detail in comparison to multiple prior imaging studies.  On my review, the MRI shows good control in his treated right frontal treated lesion with no new or concerning areas of enhancement. The patient will continue follow-up with his other managing medical providers. I will plan for repeat MRI Brain in 3 months with RTC thereafter. Ernesto Juarez MD  0/2/5369,4:99 PM    Portions of the record may have been created with voice recognition software.  Occasional wrong word or "sound a like" substitutions may have occurred due to the inherent limitations of voice recognition software.  Read the chart carefully and recognize, using context, where substitutions have occurred.

## 2023-07-13 DIAGNOSIS — C34.91 CARCINOMA OF RIGHT LUNG (HCC): ICD-10-CM

## 2023-07-13 DIAGNOSIS — D70.1 CHEMOTHERAPY-INDUCED NEUTROPENIA (HCC): ICD-10-CM

## 2023-07-13 DIAGNOSIS — T45.1X5A CHEMOTHERAPY-INDUCED NEUTROPENIA (HCC): ICD-10-CM

## 2023-07-13 DIAGNOSIS — C79.9 METASTATIC ADENOCARCINOMA (HCC): Primary | ICD-10-CM

## 2023-07-18 ENCOUNTER — HOSPITAL ENCOUNTER (OUTPATIENT)
Dept: RADIOLOGY | Age: 68
Discharge: HOME | End: 2023-07-18
Payer: COMMERCIAL

## 2023-07-18 DIAGNOSIS — C34.11 MALIGNANT NEOPLASM OF UPPER LOBE OF RIGHT LUNG (HCC): ICD-10-CM

## 2023-07-18 LAB — GLUCOSE SERPL-MCNC: 108 MG/DL (ref 65–140)

## 2023-07-18 PROCEDURE — 82948 REAGENT STRIP/BLOOD GLUCOSE: CPT

## 2023-07-18 PROCEDURE — 78815 PET IMAGE W/CT SKULL-THIGH: CPT

## 2023-07-18 PROCEDURE — G1004 CDSM NDSC: HCPCS

## 2023-07-18 PROCEDURE — A9552 F18 FDG: HCPCS

## 2023-07-19 ENCOUNTER — HOSPITAL ENCOUNTER (OUTPATIENT)
Dept: INFUSION CENTER | Facility: HOSPITAL | Age: 68
Discharge: HOME | End: 2023-07-19
Payer: COMMERCIAL

## 2023-07-19 ENCOUNTER — OFFICE VISIT (OUTPATIENT)
Dept: HEMATOLOGY ONCOLOGY | Facility: CLINIC | Age: 68
End: 2023-07-19
Payer: COMMERCIAL

## 2023-07-19 VITALS — TEMPERATURE: 98.1 F

## 2023-07-19 VITALS
RESPIRATION RATE: 16 BRPM | DIASTOLIC BLOOD PRESSURE: 70 MMHG | SYSTOLIC BLOOD PRESSURE: 120 MMHG | OXYGEN SATURATION: 96 % | WEIGHT: 183 LBS | TEMPERATURE: 97.9 F | HEART RATE: 78 BPM | BODY MASS INDEX: 27.11 KG/M2 | HEIGHT: 69 IN

## 2023-07-19 DIAGNOSIS — D70.1 CHEMOTHERAPY-INDUCED NEUTROPENIA (HCC): ICD-10-CM

## 2023-07-19 DIAGNOSIS — T45.1X5A CHEMOTHERAPY-INDUCED NEUTROPENIA (HCC): ICD-10-CM

## 2023-07-19 DIAGNOSIS — C34.11 MALIGNANT NEOPLASM OF UPPER LOBE OF RIGHT LUNG (HCC): ICD-10-CM

## 2023-07-19 DIAGNOSIS — D64.9 NORMOCYTIC ANEMIA: ICD-10-CM

## 2023-07-19 DIAGNOSIS — C79.31 METASTASIS TO BRAIN (HCC): ICD-10-CM

## 2023-07-19 DIAGNOSIS — M79.89 SWELLING OF LEFT HAND: ICD-10-CM

## 2023-07-19 DIAGNOSIS — E03.2 UNDERACTIVE THYROID DUE DRUGS: ICD-10-CM

## 2023-07-19 DIAGNOSIS — Z45.2 ENCOUNTER FOR CENTRAL LINE CARE: ICD-10-CM

## 2023-07-19 DIAGNOSIS — C34.91 CARCINOMA OF RIGHT LUNG (HCC): Primary | ICD-10-CM

## 2023-07-19 DIAGNOSIS — C79.9 METASTATIC ADENOCARCINOMA (HCC): ICD-10-CM

## 2023-07-19 LAB
ALBUMIN SERPL BCP-MCNC: 4.1 G/DL (ref 3.5–5)
ALP SERPL-CCNC: 79 U/L (ref 34–104)
ALT SERPL W P-5'-P-CCNC: 10 U/L (ref 7–52)
ANION GAP SERPL CALCULATED.3IONS-SCNC: 9 MMOL/L
AST SERPL W P-5'-P-CCNC: 15 U/L (ref 13–39)
BASOPHILS # BLD AUTO: 0.09 THOUSANDS/ÂΜL (ref 0–0.1)
BASOPHILS NFR BLD AUTO: 1 % (ref 0–1)
BILIRUB SERPL-MCNC: 0.49 MG/DL (ref 0.2–1)
BUN SERPL-MCNC: 12 MG/DL (ref 5–25)
CALCIUM SERPL-MCNC: 8.7 MG/DL (ref 8.4–10.2)
CHLORIDE SERPL-SCNC: 103 MMOL/L (ref 96–108)
CO2 SERPL-SCNC: 28 MMOL/L (ref 21–32)
CREAT SERPL-MCNC: 0.82 MG/DL (ref 0.6–1.3)
CRP SERPL QL: 9 MG/L
EOSINOPHIL # BLD AUTO: 0.13 THOUSAND/ÂΜL (ref 0–0.61)
EOSINOPHIL NFR BLD AUTO: 2 % (ref 0–6)
ERYTHROCYTE [DISTWIDTH] IN BLOOD BY AUTOMATED COUNT: 16.8 % (ref 11.6–15.1)
ERYTHROCYTE [SEDIMENTATION RATE] IN BLOOD: 36 MM/HOUR (ref 0–19)
FERRITIN SERPL-MCNC: 292 NG/ML (ref 24–336)
GFR SERPL CREATININE-BSD FRML MDRD: 89 ML/MIN/1.73SQ M
GLUCOSE SERPL-MCNC: 145 MG/DL (ref 65–140)
HCT VFR BLD AUTO: 34.6 % (ref 36.5–49.3)
HGB BLD-MCNC: 10.8 G/DL (ref 12–17)
IMM GRANULOCYTES # BLD AUTO: 0.09 THOUSAND/UL (ref 0–0.2)
IMM GRANULOCYTES NFR BLD AUTO: 1 % (ref 0–2)
IRON SATN MFR SERPL: 22 % (ref 20–50)
IRON SERPL-MCNC: 58 UG/DL (ref 65–175)
LDH SERPL-CCNC: 170 U/L (ref 140–271)
LYMPHOCYTES # BLD AUTO: 1.35 THOUSANDS/ÂΜL (ref 0.6–4.47)
LYMPHOCYTES NFR BLD AUTO: 19 % (ref 14–44)
MAGNESIUM SERPL-MCNC: 1.7 MG/DL (ref 1.9–2.7)
MCH RBC QN AUTO: 28.8 PG (ref 26.8–34.3)
MCHC RBC AUTO-ENTMCNC: 31.2 G/DL (ref 31.4–37.4)
MCV RBC AUTO: 92 FL (ref 82–98)
MONOCYTES # BLD AUTO: 0.58 THOUSAND/ÂΜL (ref 0.17–1.22)
MONOCYTES NFR BLD AUTO: 8 % (ref 4–12)
NEUTROPHILS # BLD AUTO: 4.83 THOUSANDS/ÂΜL (ref 1.85–7.62)
NEUTS SEG NFR BLD AUTO: 69 % (ref 43–75)
NRBC BLD AUTO-RTO: 0 /100 WBCS
PLATELET # BLD AUTO: 233 THOUSANDS/UL (ref 149–390)
PMV BLD AUTO: 9.3 FL (ref 8.9–12.7)
POTASSIUM SERPL-SCNC: 3.6 MMOL/L (ref 3.5–5.3)
PROT SERPL-MCNC: 6.9 G/DL (ref 6.4–8.4)
RBC # BLD AUTO: 3.75 MILLION/UL (ref 3.88–5.62)
SODIUM SERPL-SCNC: 140 MMOL/L (ref 135–147)
TIBC SERPL-MCNC: 258 UG/DL (ref 250–450)
TSH SERPL DL<=0.05 MIU/L-ACNC: 2.55 UIU/ML (ref 0.45–4.5)
URATE SERPL-MCNC: 5.7 MG/DL (ref 3.5–8.5)
WBC # BLD AUTO: 7.07 THOUSAND/UL (ref 4.31–10.16)

## 2023-07-19 PROCEDURE — 86140 C-REACTIVE PROTEIN: CPT

## 2023-07-19 PROCEDURE — 83550 IRON BINDING TEST: CPT

## 2023-07-19 PROCEDURE — 80053 COMPREHEN METABOLIC PANEL: CPT | Performed by: INTERNAL MEDICINE

## 2023-07-19 PROCEDURE — 85025 COMPLETE CBC W/AUTO DIFF WBC: CPT | Performed by: INTERNAL MEDICINE

## 2023-07-19 PROCEDURE — 85652 RBC SED RATE AUTOMATED: CPT

## 2023-07-19 PROCEDURE — 82728 ASSAY OF FERRITIN: CPT

## 2023-07-19 PROCEDURE — 99215 OFFICE O/P EST HI 40 MIN: CPT | Performed by: INTERNAL MEDICINE

## 2023-07-19 PROCEDURE — 83615 LACTATE (LD) (LDH) ENZYME: CPT

## 2023-07-19 PROCEDURE — 83540 ASSAY OF IRON: CPT

## 2023-07-19 PROCEDURE — 83735 ASSAY OF MAGNESIUM: CPT

## 2023-07-19 PROCEDURE — 84443 ASSAY THYROID STIM HORMONE: CPT

## 2023-07-19 PROCEDURE — 84550 ASSAY OF BLOOD/URIC ACID: CPT

## 2023-07-19 RX ORDER — SODIUM CHLORIDE 9 MG/ML
20 INJECTION, SOLUTION INTRAVENOUS ONCE
Status: CANCELLED | OUTPATIENT
Start: 2023-07-20

## 2023-07-19 RX ORDER — CYANOCOBALAMIN 1000 UG/ML
1000 INJECTION, SOLUTION INTRAMUSCULAR; SUBCUTANEOUS ONCE
Status: CANCELLED | OUTPATIENT
Start: 2023-07-20 | End: 2023-07-20

## 2023-07-19 NOTE — PROGRESS NOTES
Hematology/Oncology Outpatient Follow-up  Ganesh Diaz 77 y.o. male 1955 [id number]    Date:  7/19/2023        Assessment and Plan:  1. Carcinoma of right lung ([address])  I had a lengthy discussion with the patient and his wife regarding the recent PET CT scan after 3 cycles of chemotherapy/immunotherapy which showed partial response. He seems to have resolution of the activity in the right hilar region with slight decrease in the size of the right upper lobe mass. The patient was told that he will be sent to the thoracic surgical team to evaluate for surgical resection. He is due for cycle 4 tomorrow to avoid any delays I think it would be appropriate to given the fourth cycle as planned for tomorrow. If he is a surgical candidate we will see him after the completion of the surgical resection to better understand the exact pathological response. Subsequently he will be continued on adjuvant immunotherapy for at least a year. If for any reason he is not a candidate for surgical resection then we will discuss his case with the radiation oncology team for the best treatment approach. - Ambulatory referral to Thoracic Surgery; Future  - CBC and differential; Future  - Comprehensive metabolic panel; Future  - Magnesium; Future  - TSH, 3rd generation with Free T4 reflex; Future    2. Metastasis to brain [facility])  His recent MRI of the brain on 6/26/2023 was negative for any obvious signs of recurrence of his brain metastasis. He is status post surgical resection of the oligometastatic brain lesion followed by postoperative RT with SRS/SRT in 5 fractions to avoid local recurrence. 3. Chemotherapy-induced neutropenia (HCC)    4. Swelling of left hand  Today he did not have significant swelling of the left upper extremity. This seems to be intermittent process and may be related to the chemotherapy. HPI:  The patient came today for follow-up visit accompanied by his wife.   He did receive 3 cycles of combination of carboplatin/Alimta and nivolumab. Subsequently had a PET CT scan on 7/18/2023 which showed:  IMPRESSION:        1. Interval resolution of metabolic activity in association with right hilar nodes with slightly decrease in size of right upper lobe mass. 2. No evidence for metastatic disease to the abdomen or pelvis. 3. Small right paratracheal nodes with very minimal activity which is not diagnostic for malignancy and unchanged. Blood work from this morning showed white cell count of 7.0 with hemoglobin of 10.8 and a platelet count of 324. ANC was 4.8.  TSH was 2.5 with magnesium of 1.7. LDH was normal C-reactive protein was 9.0 with sed rate of 36. Uric acid 5.7. Creatinine was 0.8 with normal calcium and liver enzymes. He did complain about occasional swelling of the upper extremities and left lower extremity. He did have upper and lower Doppler ultrasound of all extremities. No deep vein thrombosis was found. The left upper extremity Doppler showed dampened flow in the left internal jugular, subclavian and innominate vein which is suggesting for more central venous obstruction. Oncology History Overview Note    This is a 56-year-old male with history of prostate cancer status post prostatectomy in 2001.  History of stroke in 2011, hypertension, etc.  The patient presented to the hospital on 3/19/2023 with 1 week of progressive left-sided weakness.   CTA of the brain showed right frontal lobe cystic lesion with surrounding vasogenic edema consistent with metastatic disease.  The patient then had CT scan of the chest abdomen pelvis on 3/20/2023 which showed:  IMPRESSION:     3.8 x 3.5 cm right upper lobe lung mass with associated overlying pleural tag and adjacent to pleural thickening, this may be due to pleural reaction or mild pleural invasion     No contralateral lung nodules or mass     Right hilar lymphadenopathy.       Small lower right paratracheal left paratracheal lymph node do not meet the criteria for pathologic enlargement on the bases of size or morphology     There is no CT evidence of for metastatic disease in the abdomen and pelvis     No lytic destructive lesion in the visualized bony skeleton  The study was marked in EPIC for significant notification.   Bone scan was negative. MRI of the brain on 3/21/2023 showed:  IMPRESSION:     Unchanged 2.5 cm right parasagittal frontal lobe vertex mass with central necrosis and marked perilesional vasogenic edema unchanged.  Favor metastatic disease (given lung mass) over primary high-grade glioma.     Unchanged small subacute infarct in right frontal lobe.     Unchanged 0.2 cm leftward midline shift.     No new acute intracranial abnormality.     There are left-sided weakness improved with dexamethasone.  The patient then was taken to the OR on 3/23/2023 and had right frontal craniotomy and resection of the right parasagittal frontal lobe mass. The pathology was compatible with metastatic non-small cell cancer.  The molecular evaluation through [facility] showed PD-L1 of 100% with high TMB of 10.  No oncogenic  mutation was found.              Oncology History   Metastatic adenocarcinoma ([address])   2023 Initial Diagnosis    Metastatic adenocarcinoma ([address])     3/23/2023 Biopsy    Brain, right frontal mass (biopsy):  - Metastatic non-small cell carcinoma     Comment:  - Tumor cells stain diffusely for CAM5.2, CKAE1/3, CK7, TTF1 and NapsinA with absent CK20, p63, CK5/6, p40 expression. This immunopanel favors a metastatic lung adenocarcinoma.        5/18/2023 -  Chemotherapy    cyanocobalamin, 1,000 mcg, Intramuscular, Once, 2 of 4 cycles  Administration: 1,000 mcg (6/29/2023), 1,000 mcg (5/18/2023)  alteplase (CATHFLO), 2 mg, Intracatheter, Every 1 Minute as needed, 3 of 6 cycles  pegfilgrastim (NEULASTA ONPRO), 6 mg, Subcutaneous, Once, 2 of 2 cycles  Administration: 6 mg (6/8/2023), 6 mg (6/29/2023)  fosaprepitant (EMEND) IVPB, 150 mg, Intravenous, Once, 3 of 6 cycles  Administration: 150 mg (5/18/2023), 150 mg (6/29/2023), 150 mg (6/8/2023)  nivolumab (OPDIVO) IVPB, 360 mg (150 % of original dose 240 mg), Intravenous, Once, 3 of 3 cycles  Dose modification: 360 mg (original dose 240 mg, Cycle 1, Reason: Dose modified as per discussion with consulting physician)  Administration: 360 mg (5/18/2023), 360 mg (6/8/2023), 360 mg (6/29/2023)  CARBOplatin (PARAPLATIN) IVPB (GO AUC DOSING), 642.5 mg, Intravenous, Once, 3 of 6 cycles  Administration: 642.5 mg (5/18/2023), 642.5 mg (6/29/2023), 650 mg (6/8/2023)  pemetrexed (ALIMTA) chemo infusion, 980 mg, Intravenous, Once, 3 of 6 cycles  Administration: 1,000 mg (5/18/2023), 1,000 mg (6/29/2023), 1,000 mg (6/8/2023)     Carcinoma of right lung ([address])   4/13/2023 Initial Diagnosis    Carcinoma of right lung ([address])     4/13/2023 -  Cancer Staged    Staging form: Lung, AJCC 8th Edition  - Clinical: Stage JAY (cT3, cN1, cM1b) - Signed by Edis Mota MD on 4/13/2023 4/19/2023 - 4/28/2023 Radiation    Plan ID Energy Fractions Dose per Fraction (cGy) Dose Correction (cGy) Total Dose Delivered (cGy) Elapsed Days   SRT R Frontal 6X-FFF 5 / 5 600 0 3,000 9         5/18/2023 -  Chemotherapy    cyanocobalamin, 1,000 mcg, Intramuscular, Once, 2 of 4 cycles  Administration: 1,000 mcg (6/29/2023), 1,000 mcg (5/18/2023)  alteplase (CATHFLO), 2 mg, Intracatheter, Every 1 Minute as needed, 3 of 6 cycles  pegfilgrastim (Mabelene Lot), 6 mg, Subcutaneous, Once, 2 of 2 cycles  Administration: 6 mg (6/8/2023), 6 mg (6/29/2023)  fosaprepitant (EMEND) IVPB, 150 mg, Intravenous, Once, 3 of 6 cycles  Administration: 150 mg (5/18/2023), 150 mg (6/29/2023), 150 mg (6/8/2023)  nivolumab (OPDIVO) IVPB, 360 mg (150 % of original dose 240 mg), Intravenous, Once, 3 of 3 cycles  Dose modification: 360 mg (original dose 240 mg, Cycle 1, Reason: Dose modified as per discussion with consulting physician)  Administration: 360 mg (5/18/2023), 360 mg (6/8/2023), 360 mg (6/29/2023)  CARBOplatin (PARAPLATIN) IVPB (GOG AUC DOSING), 642.5 mg, Intravenous, Once, 3 of 6 cycles  Administration: 642.5 mg (5/18/2023), 642.5 mg (6/29/2023), 650 mg (6/8/2023)  pemetrexed (ALIMTA) chemo infusion, 980 mg, Intravenous, Once, 3 of 6 cycles  Administration: 1,000 mg (5/18/2023), 1,000 mg (6/29/2023), 1,000 mg (6/8/2023)         Interval history:    ROS: Review of Systems   Constitutional: Negative for chills and fever. HENT: Negative for ear pain and sore throat. Eyes: Negative for pain and visual disturbance. Respiratory: Positive for cough and shortness of breath. Cardiovascular: Negative for chest pain and palpitations. Gastrointestinal: Positive for nausea. Negative for abdominal pain and vomiting. Genitourinary: Negative for dysuria and hematuria. Musculoskeletal: Negative for arthralgias and back pain. Skin: Negative for color change and rash. Neurological: Positive for numbness. Negative for seizures and syncope. All other systems reviewed and are negative.       Past Medical History:   Diagnosis Date   • Cancer ([address] 2001    Receint lung and brain lesions and prostate cancer in 2001   • Hypertension    • Prostate cancer ([address]) 2001   • Rectal bleeding    • Stroke [facility])     2011         Past Surgical History:   Procedure Laterality Date   • COLONOSCOPY     • CRANIOTOMY Right 3/23/2023    Procedure: Right frontal CRANIOTOMY IMAGE-GUIDED FOR TUMOR;  Surgeon: Lam Li MD;  Location: [facility];  Service: Neurosurgery   • IR PORT PLACEMENT  4/27/2023   • MT CYSTOURETHROSCOPY N/A 3/23/2023    Procedure: EUA, DEL CASTILLO INSERTION;  Surgeon: Brie Curtis MD;  Location: [facility];  Service: Urology   • PROSTATECTOMY  2001   • TONSILLECTOMY         Social History     Socioeconomic History   • Marital status: /Civil Union     Spouse name: None   • Number of children: None   • Years of education: None   • Highest education level: None   Occupational History   • None   Tobacco Use   • Smoking status: Former     Packs/day: 1.00     Years: 15.00     Total pack years: 15.00     Types: Cigarettes     Quit date: 44     Years since quittin.5     Passive exposure: Never   • Smokeless tobacco: Never   • Tobacco comments:     i quit when i was 28. not sure of exact dates   Vaping Use   • Vaping Use: Never used   Substance and Sexual Activity   • Alcohol use: Not Currently     Alcohol/week: 6.0 standard drinks of alcohol     Types: 6 Cans of beer per week     Comment: socially   • Drug use: Not Currently     Types: Marijuana   • Sexual activity: Yes     Partners: Female     Birth control/protection: None   Other Topics Concern   • None   Social History Narrative   • None     Social Determinants of Health     Financial Resource Strain: Not on file   Food Insecurity: No Food Insecurity (3/20/2023)    Hunger Vital Sign    • Worried About Running Out of Food in the Last Year: Never true    • Ran Out of Food in the Last Year: Never true   Transportation Needs: No Transportation Needs (3/20/2023)    PRAPARE - Transportation    • Lack of Transportation (Medical): No    • Lack of Transportation (Non-Medical):  No   Physical Activity: Not on file   Stress: Not on file   Social Connections: Not on file   Intimate Partner Violence: Not on file   Housing Stability: Low Risk  (3/20/2023)    Housing Stability Vital Sign    • Unable to Pay for Housing in the Last Year: No    • Number of Places Lived in the Last Year: 1    • Unstable Housing in the Last Year: No       Family History   Problem Relation Age of Onset   • Dementia Mother            • Breast cancer Sister            • Prostate cancer Brother         Received Radiation       No Known Allergies      Current Outpatient Medications:   •  acetaminophen (TYLENOL) 325 mg tablet, Take 3 tablets (975 mg total) by mouth every 8 (eight) hours as needed for mild pain or headaches, Disp: 30 tablet, Rfl: 0  •  amLODIPine (NORVASC) 10 mg tablet, Take 1 tablet (10 mg total) by mouth daily, Disp: 90 tablet, Rfl: 3  •  atorvastatin (LIPITOR) 40 mg tablet, Take 1 tablet (40 mg total) by mouth daily after dinner, Disp: 30 tablet, Rfl: 0  •  folic acid (KP Folic Acid) 1 mg tablet, Take 1 tablet (1 mg total) by mouth daily, Disp: 30 tablet, Rfl: 11  •  losartan (COZAAR) 50 mg tablet, Take 1 tablet (50 mg total) by mouth daily, Disp: 90 tablet, Rfl: 3  •  Magnesium Oxide, Elemental, 400 MG TABS, Take 400 mg by mouth in the morning, Disp: 30 tablet, Rfl: 6  •  ondansetron (ZOFRAN) 8 mg tablet, Take 1 tablet (8 mg total) by mouth every 8 (eight) hours as needed for nausea or vomiting, Disp: 20 tablet, Rfl: 3  No current facility-administered medications for this visit. Facility-Administered Medications Ordered in Other Visits:   •  alteplase (CATHFLO) injection 2 mg, 2 mg, Intracatheter, Q1MIN PRN, Joleen Walker MD      Physical Exam:  /70 (BP Location: Right arm, Patient Position: Sitting, Cuff Size: Adult)   Pulse 78   Temp 97.9 °F (36.6 °C)   Resp 16   Ht 5' 9" (1.753 m)   Wt 83 kg (183 lb)   SpO2 96%   BMI 27.02 kg/m²     Physical Exam  Constitutional:       Appearance: He is well-developed. HENT:      Head: Normocephalic and atraumatic. Nose: Nose normal.   Eyes:      General: No scleral icterus. Right eye: No discharge. Left eye: No discharge. Conjunctiva/sclera: Conjunctivae normal.      Pupils: Pupils are equal, round, and reactive to light. Neck:      Thyroid: No thyromegaly. Trachea: No tracheal deviation. Cardiovascular:      Rate and Rhythm: Normal rate and regular rhythm. Heart sounds: Normal heart sounds. No murmur heard. No friction rub. Pulmonary:      Effort: Pulmonary effort is normal. No respiratory distress. Breath sounds: Normal breath sounds. No wheezing or rales. Chest:      Chest wall: No tenderness.    Abdominal: General: There is no distension. Palpations: Abdomen is soft. There is no hepatomegaly or splenomegaly. Tenderness: There is no abdominal tenderness. There is no guarding or rebound. Musculoskeletal:         General: No tenderness or deformity. Normal range of motion. Cervical back: Normal range of motion and neck supple. Lymphadenopathy:      Cervical: No cervical adenopathy. Skin:     General: Skin is warm and dry. Coloration: Skin is not pale. Findings: No erythema or rash. Neurological:      Mental Status: He is alert and oriented to person, place, and time. Cranial Nerves: No cranial nerve deficit. Coordination: Coordination normal.      Deep Tendon Reflexes: Reflexes are normal and symmetric. Psychiatric:         Behavior: Behavior normal.         Thought Content: Thought content normal.         Judgment: Judgment normal.           Labs:  Lab Results   Component Value Date    WBC 7.07 07/19/2023    HGB 10.8 (L) 07/19/2023    HCT 34.6 (L) 07/19/2023    MCV 92 07/19/2023     07/19/2023     Lab Results   Component Value Date     05/10/2018    K 3.6 07/19/2023     07/19/2023    CO2 28 07/19/2023    ANIONGAP 11.6 05/10/2018    BUN 12 07/19/2023    CREATININE 0.82 07/19/2023    GLUF 116 (H) 06/27/2023    CALCIUM 8.7 07/19/2023    AST 15 07/19/2023    ALT 10 07/19/2023    ALKPHOS 79 07/19/2023    PROT 7.2 05/10/2018    BILITOT 0.8 05/10/2018    EGFR 89 07/19/2023     No results found for: "TSH"    Patient voiced understanding and agreement in the above discussion. Aware to contact our office with questions/symptoms in the interim.

## 2023-07-20 ENCOUNTER — HOSPITAL ENCOUNTER (OUTPATIENT)
Dept: INFUSION CENTER | Facility: HOSPITAL | Age: 68
End: 2023-07-20
Attending: INTERNAL MEDICINE
Payer: COMMERCIAL

## 2023-07-20 ENCOUNTER — TELEPHONE (OUTPATIENT)
Dept: HEMATOLOGY ONCOLOGY | Facility: CLINIC | Age: 68
End: 2023-07-20

## 2023-07-20 ENCOUNTER — PATIENT OUTREACH (OUTPATIENT)
Dept: CASE MANAGEMENT | Facility: HOSPITAL | Age: 68
End: 2023-07-20

## 2023-07-20 VITALS
HEART RATE: 80 BPM | HEIGHT: 69 IN | RESPIRATION RATE: 18 BRPM | SYSTOLIC BLOOD PRESSURE: 137 MMHG | OXYGEN SATURATION: 96 % | TEMPERATURE: 96.9 F | BODY MASS INDEX: 27.23 KG/M2 | WEIGHT: 183.86 LBS | DIASTOLIC BLOOD PRESSURE: 81 MMHG

## 2023-07-20 DIAGNOSIS — C79.9 METASTATIC ADENOCARCINOMA (HCC): Primary | ICD-10-CM

## 2023-07-20 DIAGNOSIS — D70.1 CHEMOTHERAPY-INDUCED NEUTROPENIA (HCC): ICD-10-CM

## 2023-07-20 DIAGNOSIS — T45.1X5A CHEMOTHERAPY-INDUCED NEUTROPENIA (HCC): ICD-10-CM

## 2023-07-20 DIAGNOSIS — C34.91 CARCINOMA OF RIGHT LUNG (HCC): ICD-10-CM

## 2023-07-20 PROCEDURE — 96367 TX/PROPH/DG ADDL SEQ IV INF: CPT

## 2023-07-20 PROCEDURE — 96377 APPLICATON ON-BODY INJECTOR: CPT

## 2023-07-20 PROCEDURE — 96413 CHEMO IV INFUSION 1 HR: CPT

## 2023-07-20 PROCEDURE — 96411 CHEMO IV PUSH ADDL DRUG: CPT

## 2023-07-20 PROCEDURE — 96372 THER/PROPH/DIAG INJ SC/IM: CPT

## 2023-07-20 PROCEDURE — 96417 CHEMO IV INFUS EACH ADDL SEQ: CPT

## 2023-07-20 RX ORDER — CYANOCOBALAMIN 1000 UG/ML
1000 INJECTION, SOLUTION INTRAMUSCULAR; SUBCUTANEOUS ONCE
Status: COMPLETED | OUTPATIENT
Start: 2023-07-20 | End: 2023-07-20

## 2023-07-20 RX ORDER — SODIUM CHLORIDE 9 MG/ML
20 INJECTION, SOLUTION INTRAVENOUS ONCE
Status: COMPLETED | OUTPATIENT
Start: 2023-07-20 | End: 2023-07-20

## 2023-07-20 RX ADMIN — SODIUM CHLORIDE 20 ML/HR: 0.9 INJECTION, SOLUTION INTRAVENOUS at 11:20

## 2023-07-20 RX ADMIN — PEGFILGRASTIM 6 MG: KIT SUBCUTANEOUS at 14:09

## 2023-07-20 RX ADMIN — DEXAMETHASONE SODIUM PHOSPHATE: 10 INJECTION, SOLUTION INTRAMUSCULAR; INTRAVENOUS at 10:23

## 2023-07-20 RX ADMIN — FOSAPREPITANT 150 MG: 150 INJECTION, POWDER, LYOPHILIZED, FOR SOLUTION INTRAVENOUS at 10:49

## 2023-07-20 RX ADMIN — CARBOPLATIN 566.5 MG: 10 INJECTION, SOLUTION INTRAVENOUS at 12:57

## 2023-07-20 RX ADMIN — CYANOCOBALAMIN 1000 MCG: 1000 INJECTION, SOLUTION INTRAMUSCULAR at 14:08

## 2023-07-20 RX ADMIN — SODIUM CHLORIDE 1000 MG: 9 INJECTION, SOLUTION INTRAVENOUS at 12:27

## 2023-07-20 RX ADMIN — SODIUM CHLORIDE 360 MG: 9 INJECTION, SOLUTION INTRAVENOUS at 11:33

## 2023-07-20 NOTE — PROGRESS NOTES
Clarified with Najma Saeed RN with Dr Kwesi Lagos that patient is to receive opdivo today since it was not in the plan, Neulasta On-pro also to be given today.

## 2023-07-20 NOTE — PROGRESS NOTES
Opdivo, alimta and carboplatin given without incident. Neulasta On-Pro applied to Left arm green light flashing on D/C he verbalizes understanding of when to remove. AVS given.

## 2023-07-20 NOTE — PROGRESS NOTES
MARYLINW met with patient and his wife MaryCollette at the [facility] center this afternoon. Patient reports having a good visit with Dr Mauro Higginbotham yesterday and showing some improvement. His MRI to brain was negative for metastasis. Patient is glad for that news. He was hopeful he wouldn't have to return to chemo today but realizes he has to continue with plan from oncology. Patient and his wife enjoyed spending time with their grandchildren last week. Their five year old grandson stayed over for a week. Patient said he tired him out. Patient has been feeling fine. He knows in a day or two after chemo he will be more nauseous and tired. His plans are to rest this weekend. Pt's wife is very supportive and always at his side. They have no concerns or questions at this time. Emotional support provided.

## 2023-07-20 NOTE — TELEPHONE ENCOUNTER
Appointment Schedule   Who are you speaking with? Patient   If it is not the patient, are they listed on an active communication consent form? Yes   Which provider is the appointment scheduled with? Dr. Guerrero Angela   At which location is the appointment scheduled for? [facility]   When is the appointment scheduled? Please list date and time 8/16/23   What is the reason for this appointment? Follow up /Carcinoma of right lung      Did patient voice understanding of the details of this appointment? Yes   Was the no show policy reviewed with patient?  Yes

## 2023-07-20 NOTE — TELEPHONE ENCOUNTER
I called Radha Dennis in response to a referral that was received for patient to establish care with Thoracic Surgery. Outreach was made to schedule a follow up appointment. I left a voicemail explaining the reason for my call and advised patient to call [facility] at [phone]. Another attempt will be made to contact patient.

## 2023-07-21 ENCOUNTER — TELEPHONE (OUTPATIENT)
Dept: HEMATOLOGY ONCOLOGY | Facility: CLINIC | Age: 68
End: 2023-07-21

## 2023-07-21 ENCOUNTER — TELEPHONE (OUTPATIENT)
Dept: CARDIAC SURGERY | Facility: CLINIC | Age: 68
End: 2023-07-21

## 2023-07-21 NOTE — TELEPHONE ENCOUNTER
NATTY for pt to please call our office to schedule a follow up with Dr. Cesar Campbell. We were looking to schedule him either 8/9 or 8/16.

## 2023-07-21 NOTE — TELEPHONE ENCOUNTER
Appointment Change  Cancel, Reschedule, Change to Virtual      Who are you speaking with? Patient   If it is not the patient, are they listed on an active communication consent form? Yes   Which provider is the appointment scheduled with? Dr. Heath Viera   When is the appointment scheduled? Please list date and time 8/16/23   At which location is the appointment scheduled to take place? [facility]   Was the appointment rescheduled or changed from an in person visit to a virtual visit? If so, please list the details of the change. 8/9/23  Ok per Robert Delatorre   What is the reason for the appointment change?  Was advised there was an earlier appt

## 2023-08-09 ENCOUNTER — DOCUMENTATION (OUTPATIENT)
Dept: CARDIAC SURGERY | Facility: CLINIC | Age: 68
End: 2023-08-09

## 2023-08-09 ENCOUNTER — OFFICE VISIT (OUTPATIENT)
Dept: CARDIAC SURGERY | Facility: CLINIC | Age: 68
End: 2023-08-09
Payer: COMMERCIAL

## 2023-08-09 VITALS
DIASTOLIC BLOOD PRESSURE: 77 MMHG | WEIGHT: 189.38 LBS | RESPIRATION RATE: 16 BRPM | BODY MASS INDEX: 28.05 KG/M2 | HEART RATE: 91 BPM | TEMPERATURE: 97.9 F | OXYGEN SATURATION: 94 % | HEIGHT: 69 IN | SYSTOLIC BLOOD PRESSURE: 140 MMHG

## 2023-08-09 DIAGNOSIS — C34.91 CARCINOMA OF RIGHT LUNG (HCC): ICD-10-CM

## 2023-08-09 PROCEDURE — 99215 OFFICE O/P EST HI 40 MIN: CPT | Performed by: THORACIC SURGERY (CARDIOTHORACIC VASCULAR SURGERY)

## 2023-08-09 RX ORDER — ACETAMINOPHEN 325 MG/1
975 TABLET ORAL ONCE
OUTPATIENT
Start: 2023-08-09 | End: 2023-08-09

## 2023-08-09 RX ORDER — GABAPENTIN 300 MG/1
600 CAPSULE ORAL ONCE
OUTPATIENT
Start: 2023-08-09 | End: 2023-08-09

## 2023-08-09 RX ORDER — CEFAZOLIN SODIUM 2 G/50ML
2000 SOLUTION INTRAVENOUS ONCE
OUTPATIENT
Start: 2023-08-09 | End: 2023-08-09

## 2023-08-09 RX ORDER — HEPARIN SODIUM 5000 [USP'U]/ML
5000 INJECTION, SOLUTION INTRAVENOUS; SUBCUTANEOUS
OUTPATIENT
Start: 2023-08-09 | End: 2023-08-10

## 2023-08-09 NOTE — PROGRESS NOTES
Assessment/Plan:    Carcinoma of right lung [facility])  We had a discussion with Deandra Contreras and his wife regarding his recent testing. His brain MRI was negative for further mets. His PET scan had a slight interval decrease in size of the right upper lobe lung mass, with decrease size of paratracheal nodes, and resolution of the hilar lymphadenopathy, which proves at least a partial response to therapy. Therefore, he is a candidate for surgical resection. This would include a right robotic assisted thoracoscopic upper lobectomy, possible right thoracotomy. He will need some blood work prior to the procedure, EKG is current. The procedure, possible risks, and post operative course were explained and all questions were answered. He is in agreement with the plan. Diagnoses and all orders for this visit:    Carcinoma of right lung [facility])  -     Ambulatory referral to Thoracic Surgery  -     Case request operating room: LOBECTOMY LUNG THORACOSCOPIC W/ ROBOTICS, THORACOTOMY, BRONCHOSCOPY FLEXIBLE; Standing  -     Type and screen; Future  -     Protime-INR; Future  -     APTT; Future    Other orders  -     Diet NPO; Sips with meds; Standing  -     Nursing communication Please give pre-op Carbohydrate drink to patient 2-4 hours prior to surgery; Standing  -     Void on call to OR; Standing  -     Insert peripheral IV;  Standing  -     Place sequential compression device; Standing  -     acetaminophen (TYLENOL) tablet 975 mg  -     gabapentin (NEURONTIN) capsule 600 mg  -     heparin (porcine) subcutaneous injection 5,000 Units  -     ceFAZolin (ANCEF) IVPB (premix in dextrose) 2,000 mg 50 mL          Thoracic History   Cancer Staging   Carcinoma of right lung [facility])  Staging form: Lung, AJCC 8th Edition  - Clinical: Stage JAY (cT3, cN1, cM1b) - Signed by Felix Alamo MD on 4/13/2023    Oncology History   Metastatic adenocarcinoma ([address])   2023 Initial Diagnosis    Metastatic adenocarcinoma ([address])     3/23/2023 Biopsy Brain, right frontal mass (biopsy):  - Metastatic non-small cell carcinoma     Comment:  - Tumor cells stain diffusely for CAM5.2, CKAE1/3, CK7, TTF1 and NapsinA with absent CK20, p63, CK5/6, p40 expression. This immunopanel favors a metastatic lung adenocarcinoma.        5/18/2023 -  Chemotherapy    cyanocobalamin, 1,000 mcg, Intramuscular, Once, 3 of 3 cycles  Administration: 1,000 mcg (6/29/2023), 1,000 mcg (5/18/2023), 1,000 mcg (7/20/2023)  alteplase (CATHFLO), 2 mg, Intracatheter, Every 1 Minute as needed, 4 of 4 cycles  pegfilgrastim (Walda Rich), 6 mg, Subcutaneous, Once, 3 of 3 cycles  Administration: 6 mg (6/8/2023), 6 mg (6/29/2023), 6 mg (7/20/2023)  fosaprepitant (EMEND) IVPB, 150 mg, Intravenous, Once, 4 of 4 cycles  Administration: 150 mg (5/18/2023), 150 mg (6/29/2023), 150 mg (7/20/2023), 150 mg (6/8/2023)  nivolumab (OPDIVO) IVPB, 360 mg (150 % of original dose 240 mg), Intravenous, Once, 4 of 4 cycles  Dose modification: 360 mg (original dose 240 mg, Cycle 1, Reason: Dose modified as per discussion with consulting physician)  Administration: 360 mg (5/18/2023), 360 mg (6/8/2023), 360 mg (6/29/2023), 360 mg (7/20/2023)  CARBOplatin (PARAPLATIN) IVPB (GOG AUC DOSING), 642.5 mg, Intravenous, Once, 4 of 4 cycles  Administration: 642.5 mg (5/18/2023), 642.5 mg (6/29/2023), 566.5 mg (7/20/2023), 650 mg (6/8/2023)  pemetrexed (ALIMTA) chemo infusion, 980 mg, Intravenous, Once, 4 of 4 cycles  Administration: 1,000 mg (5/18/2023), 1,000 mg (6/29/2023), 1,000 mg (7/20/2023), 1,000 mg (6/8/2023)     Carcinoma of right lung ([address])   4/13/2023 Initial Diagnosis    Carcinoma of right lung ([address])     4/13/2023 -  Cancer Staged    Staging form: Lung, AJCC 8th Edition  - Clinical: Stage JAY (cT3, cN1, cM1b) - Signed by Randy Babcock MD on 4/13/2023 4/19/2023 - 4/28/2023 Radiation    Plan ID Energy Fractions Dose per Fraction (cGy) Dose Correction (cGy) Total Dose Delivered (cGy) Elapsed Days   SRT R Frontal 6X-FFF 5 / 5 600 0 3,000 9         5/18/2023 -  Chemotherapy    cyanocobalamin, 1,000 mcg, Intramuscular, Once, 3 of 3 cycles  Administration: 1,000 mcg (6/29/2023), 1,000 mcg (5/18/2023), 1,000 mcg (7/20/2023)  alteplase (CATHFLO), 2 mg, Intracatheter, Every 1 Minute as needed, 4 of 4 cycles  pegfilgrastim (Jhon Rides), 6 mg, Subcutaneous, Once, 3 of 3 cycles  Administration: 6 mg (6/8/2023), 6 mg (6/29/2023), 6 mg (7/20/2023)  fosaprepitant (EMEND) IVPB, 150 mg, Intravenous, Once, 4 of 4 cycles  Administration: 150 mg (5/18/2023), 150 mg (6/29/2023), 150 mg (7/20/2023), 150 mg (6/8/2023)  nivolumab (OPDIVO) IVPB, 360 mg (150 % of original dose 240 mg), Intravenous, Once, 4 of 4 cycles  Dose modification: 360 mg (original dose 240 mg, Cycle 1, Reason: Dose modified as per discussion with consulting physician)  Administration: 360 mg (5/18/2023), 360 mg (6/8/2023), 360 mg (6/29/2023), 360 mg (7/20/2023)  CARBOplatin (PARAPLATIN) IVPB (GOG AUC DOSING), 642.5 mg, Intravenous, Once, 4 of 4 cycles  Administration: 642.5 mg (5/18/2023), 642.5 mg (6/29/2023), 566.5 mg (7/20/2023), 650 mg (6/8/2023)  pemetrexed (ALIMTA) chemo infusion, 980 mg, Intravenous, Once, 4 of 4 cycles  Administration: 1,000 mg (5/18/2023), 1,000 mg (6/29/2023), 1,000 mg (7/20/2023), 1,000 mg (6/8/2023)             Patient ID: Kateryna Ziegler is a 77 y.o. male. 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 Dr. Dia Mai recommended seeing him back with a PET scan 2 weeks after he completed chemo/immunotherapy and an updated brain MRI to see if he would be a surgical candidate for resection. PET scan from 7/18/23 reveals a SUV of 19 with the right upper lobe mass, previously 20. Right upper paratracheal node with a SUV of 1.9, unchanged, resolution of right hilar adenopathy.  Right upper lobe lung mass measuring 3.5  x 3.8, previously 4 x 3.9 cm. No other metastatic disease noted. Brain MRI from 6/26/23 did not reveal any new or additional brain mets. His next brain MRI is 10/5/23. PFT's from 5/8/23 revealed a FVC of 3.78L, 91%, FEV1 of 2.88L, 91%, and a DLCO of 82% predicted. On discussion, he tolerated the chemotherapy okay. He had dizziness, fatigue, metallic taste, bone pains, nausea. No vomiting, or any other major symptom. He feels better now, since it has been 3 weeks since his last treatment,7/20/23. The following portions of the patient's history were reviewed and updated as appropriate: allergies, current medications, past family history, past medical history, past social history, past surgical history and problem list.    Review of Systems   Constitutional: Positive for fatigue. Negative for activity change, appetite change, chills, fever and unexpected weight change. HENT: Negative for congestion, postnasal drip, sore throat, trouble swallowing and voice change. Eyes: Positive for visual disturbance (wears glasses). Respiratory: Positive for shortness of breath. Negative for cough, chest tightness and wheezing. Cardiovascular: Negative for chest pain and leg swelling. Gastrointestinal: Negative for abdominal pain, nausea and vomiting. Musculoskeletal: Positive for arthralgias (joint pains). Negative for back pain, gait problem and neck pain. Skin: Negative for color change. Neurological: Positive for dizziness. Negative for syncope, light-headedness and headaches. Psychiatric/Behavioral: Negative for agitation, behavioral problems and confusion. The patient is not nervous/anxious. All other systems reviewed and are negative. Objective:   Physical Exam  Vitals reviewed. Constitutional:       General: He is not in acute distress. Appearance: Normal appearance. He is normal weight. He is not diaphoretic. HENT:      Head: Normocephalic and atraumatic. Nose: Nose normal.   Eyes:      General: No scleral icterus. Extraocular Movements: Extraocular movements intact. Comments: + glasses     Cardiovascular:      Rate and Rhythm: Normal rate and regular rhythm. Pulses: Normal pulses. Heart sounds: Normal heart sounds. No murmur heard. Pulmonary:      Effort: Pulmonary effort is normal. No respiratory distress. Breath sounds: Normal breath sounds. No wheezing. Abdominal:      General: Bowel sounds are normal. There is no distension. Palpations: Abdomen is soft. Musculoskeletal:      Cervical back: Normal range of motion and neck supple. Right lower leg: No edema. Left lower leg: No edema. Lymphadenopathy:      Cervical: No cervical adenopathy. Skin:     General: Skin is warm and dry. Coloration: Skin is not jaundiced. Findings: No erythema or rash. Neurological:      Mental Status: He is alert and oriented to person, place, and time. Mental status is at baseline. Psychiatric:         Mood and Affect: Mood normal.         Behavior: Behavior normal.         Thought Content: Thought content normal.     /77 (BP Location: Left arm, Patient Position: Sitting, Cuff Size: Standard)   Pulse 91   Temp 97.9 °F (36.6 °C) (Temporal)   Resp 16   Ht 5' 9" (1.753 m)   Wt 85.9 kg (189 lb 6 oz)   SpO2 94%   BMI 27.97 kg/m²        NM PET CT skull base to mid thigh    Result Date: 7/18/2023  Narrative PET/CT SCAN INDICATION: C34.11: Malignant neoplasm of upper lobe, right bronchus or lung, metastatic right non-small cell lung cancer, status post neoadjuvant chemotherapy, resection of right frontal lobe mass. MODIFIER: PS COMPARISON: CTA chest June 27, 2023, MRI brain June 2023, PET/CT April 2023, CT chest abdomen and pelvis March 2023 CELL TYPE: Non-small cell neoplasm TECHNIQUE:   8.1 mCi F-18-FDG administered IV.  Multiplanar attenuation corrected and non attenuation corrected PET images are available for interpretation, and contiguous, low dose, axial CT sections were obtained from the vertex through the femurs. Intravenous contrast material was not utilized. This examination, like all CT scans performed in the [facility], was performed utilizing techniques to minimize radiation dose exposure, including the use of iterative reconstruction and automated exposure control. Fasting serum glucose: 108 mg/dl FINDINGS: Mediastinal blood flow SUV max 2 Hepatic parenchyma SUV max 2.7 VISUALIZED BRAIN: Postsurgical changes, status post right frontal craniotomy. HEAD/NECK: There is a physiologic distribution of FDG. No FDG avid cervical adenopathy is seen. CT images: Unremarkable. CHEST: Hypermetabolic mass is again demonstrated in the right upper lobe with SUV max of 19 compared to 20 on the previous exam image 107. Right upper paratracheal node is nonspecific with SUV max of 1.9. Size is unchanged. There is interval resolution of hypermetabolic right hilar adenopathy. CT images: Right portacatheter. Right upper lobe mass measures 3.5 x 3.8 cm series 3/107, compared to 4 x 3.9 cm. Right paratracheal nodes, measuring 8 to 10 mm are unchanged and were not showing definite increased metabolic activity. Right hilar adenopathy cannot be assessed on unenhanced exam. Coronary artery calcifications. Gynecomastia. ABDOMEN: No FDG avid soft tissue lesions are seen. CT images: Atherosclerotic disease of the abdominal aorta without aneurysmal dilatation. Fat-containing umbilical hernia. Fat-containing left inguinal hernia. PELVIS: No FDG avid soft tissue lesions are seen. CT images: Unremarkable. OSSEOUS STRUCTURES: No FDG avid lesions are seen. 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 Grade 1 anterolisthesis of L5 on S1. Impression 1. Interval resolution of metabolic activity in association with right hilar nodes with slightly decrease in size of right upper lobe mass.  2. No evidence for metastatic disease to the abdomen or pelvis. 3. Small right paratracheal nodes with very minimal activity which is not diagnostic for malignancy and unchanged.  Workstation performed: [id number]

## 2023-08-09 NOTE — H&P (VIEW-ONLY)
Assessment/Plan:    Carcinoma of right lung [facility])  We had a discussion with Ruslan Liu and his wife regarding his recent testing. His brain MRI was negative for further mets. His PET scan had a slight interval decrease in size of the right upper lobe lung mass, with decrease size of paratracheal nodes, and resolution of the hilar lymphadenopathy, which proves at least a partial response to therapy. Therefore, he is a candidate for surgical resection. This would include a right robotic assisted thoracoscopic upper lobectomy, possible right thoracotomy. He will need some blood work prior to the procedure, EKG is current. The procedure, possible risks, and post operative course were explained and all questions were answered. He is in agreement with the plan. Diagnoses and all orders for this visit:    Carcinoma of right lung [facility])  -     Ambulatory referral to Thoracic Surgery  -     Case request operating room: LOBECTOMY LUNG THORACOSCOPIC W/ ROBOTICS, THORACOTOMY, BRONCHOSCOPY FLEXIBLE; Standing  -     Type and screen; Future  -     Protime-INR; Future  -     APTT; Future    Other orders  -     Diet NPO; Sips with meds; Standing  -     Nursing communication Please give pre-op Carbohydrate drink to patient 2-4 hours prior to surgery; Standing  -     Void on call to OR; Standing  -     Insert peripheral IV;  Standing  -     Place sequential compression device; Standing  -     acetaminophen (TYLENOL) tablet 975 mg  -     gabapentin (NEURONTIN) capsule 600 mg  -     heparin (porcine) subcutaneous injection 5,000 Units  -     ceFAZolin (ANCEF) IVPB (premix in dextrose) 2,000 mg 50 mL          Thoracic History   Cancer Staging   Carcinoma of right lung [facility])  Staging form: Lung, AJCC 8th Edition  - Clinical: Stage JAY (cT3, cN1, cM1b) - Signed by Charlette Birmingham MD on 4/13/2023    Oncology History   Metastatic adenocarcinoma ([address])   2023 Initial Diagnosis    Metastatic adenocarcinoma ([address])     3/23/2023 Biopsy Brain, right frontal mass (biopsy):  - Metastatic non-small cell carcinoma     Comment:  - Tumor cells stain diffusely for CAM5.2, CKAE1/3, CK7, TTF1 and NapsinA with absent CK20, p63, CK5/6, p40 expression. This immunopanel favors a metastatic lung adenocarcinoma.        5/18/2023 -  Chemotherapy    cyanocobalamin, 1,000 mcg, Intramuscular, Once, 3 of 3 cycles  Administration: 1,000 mcg (6/29/2023), 1,000 mcg (5/18/2023), 1,000 mcg (7/20/2023)  alteplase (CATHFLO), 2 mg, Intracatheter, Every 1 Minute as needed, 4 of 4 cycles  pegfilgrastim (Fredda Hunting), 6 mg, Subcutaneous, Once, 3 of 3 cycles  Administration: 6 mg (6/8/2023), 6 mg (6/29/2023), 6 mg (7/20/2023)  fosaprepitant (EMEND) IVPB, 150 mg, Intravenous, Once, 4 of 4 cycles  Administration: 150 mg (5/18/2023), 150 mg (6/29/2023), 150 mg (7/20/2023), 150 mg (6/8/2023)  nivolumab (OPDIVO) IVPB, 360 mg (150 % of original dose 240 mg), Intravenous, Once, 4 of 4 cycles  Dose modification: 360 mg (original dose 240 mg, Cycle 1, Reason: Dose modified as per discussion with consulting physician)  Administration: 360 mg (5/18/2023), 360 mg (6/8/2023), 360 mg (6/29/2023), 360 mg (7/20/2023)  CARBOplatin (PARAPLATIN) IVPB (GOG AUC DOSING), 642.5 mg, Intravenous, Once, 4 of 4 cycles  Administration: 642.5 mg (5/18/2023), 642.5 mg (6/29/2023), 566.5 mg (7/20/2023), 650 mg (6/8/2023)  pemetrexed (ALIMTA) chemo infusion, 980 mg, Intravenous, Once, 4 of 4 cycles  Administration: 1,000 mg (5/18/2023), 1,000 mg (6/29/2023), 1,000 mg (7/20/2023), 1,000 mg (6/8/2023)     Carcinoma of right lung ([address])   4/13/2023 Initial Diagnosis    Carcinoma of right lung ([address])     4/13/2023 -  Cancer Staged    Staging form: Lung, AJCC 8th Edition  - Clinical: Stage JAY (cT3, cN1, cM1b) - Signed by Farshad East MD on 4/13/2023 4/19/2023 - 4/28/2023 Radiation    Plan ID Energy Fractions Dose per Fraction (cGy) Dose Correction (cGy) Total Dose Delivered (cGy) Elapsed Days   SRT R Frontal 6X-FFF 5 / 5 600 0 3,000 9         5/18/2023 -  Chemotherapy    cyanocobalamin, 1,000 mcg, Intramuscular, Once, 3 of 3 cycles  Administration: 1,000 mcg (6/29/2023), 1,000 mcg (5/18/2023), 1,000 mcg (7/20/2023)  alteplase (CATHFLO), 2 mg, Intracatheter, Every 1 Minute as needed, 4 of 4 cycles  pegfilgrastim (Abbe Imus), 6 mg, Subcutaneous, Once, 3 of 3 cycles  Administration: 6 mg (6/8/2023), 6 mg (6/29/2023), 6 mg (7/20/2023)  fosaprepitant (EMEND) IVPB, 150 mg, Intravenous, Once, 4 of 4 cycles  Administration: 150 mg (5/18/2023), 150 mg (6/29/2023), 150 mg (7/20/2023), 150 mg (6/8/2023)  nivolumab (OPDIVO) IVPB, 360 mg (150 % of original dose 240 mg), Intravenous, Once, 4 of 4 cycles  Dose modification: 360 mg (original dose 240 mg, Cycle 1, Reason: Dose modified as per discussion with consulting physician)  Administration: 360 mg (5/18/2023), 360 mg (6/8/2023), 360 mg (6/29/2023), 360 mg (7/20/2023)  CARBOplatin (PARAPLATIN) IVPB (GOG AUC DOSING), 642.5 mg, Intravenous, Once, 4 of 4 cycles  Administration: 642.5 mg (5/18/2023), 642.5 mg (6/29/2023), 566.5 mg (7/20/2023), 650 mg (6/8/2023)  pemetrexed (ALIMTA) chemo infusion, 980 mg, Intravenous, Once, 4 of 4 cycles  Administration: 1,000 mg (5/18/2023), 1,000 mg (6/29/2023), 1,000 mg (7/20/2023), 1,000 mg (6/8/2023)             Patient ID: Triston Triniadd is a 77 y.o. male. 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 Dr. Akiko Ceron recommended seeing him back with a PET scan 2 weeks after he completed chemo/immunotherapy and an updated brain MRI to see if he would be a surgical candidate for resection. PET scan from 7/18/23 reveals a SUV of 19 with the right upper lobe mass, previously 20. Right upper paratracheal node with a SUV of 1.9, unchanged, resolution of right hilar adenopathy.  Right upper lobe lung mass measuring 3.5  x 3.8, previously 4 x 3.9 cm. No other metastatic disease noted. Brain MRI from 6/26/23 did not reveal any new or additional brain mets. His next brain MRI is 10/5/23. PFT's from 5/8/23 revealed a FVC of 3.78L, 91%, FEV1 of 2.88L, 91%, and a DLCO of 82% predicted. On discussion, he tolerated the chemotherapy okay. He had dizziness, fatigue, metallic taste, bone pains, nausea. No vomiting, or any other major symptom. He feels better now, since it has been 3 weeks since his last treatment,7/20/23. The following portions of the patient's history were reviewed and updated as appropriate: allergies, current medications, past family history, past medical history, past social history, past surgical history and problem list.    Review of Systems   Constitutional: Positive for fatigue. Negative for activity change, appetite change, chills, fever and unexpected weight change. HENT: Negative for congestion, postnasal drip, sore throat, trouble swallowing and voice change. Eyes: Positive for visual disturbance (wears glasses). Respiratory: Positive for shortness of breath. Negative for cough, chest tightness and wheezing. Cardiovascular: Negative for chest pain and leg swelling. Gastrointestinal: Negative for abdominal pain, nausea and vomiting. Musculoskeletal: Positive for arthralgias (joint pains). Negative for back pain, gait problem and neck pain. Skin: Negative for color change. Neurological: Positive for dizziness. Negative for syncope, light-headedness and headaches. Psychiatric/Behavioral: Negative for agitation, behavioral problems and confusion. The patient is not nervous/anxious. All other systems reviewed and are negative. Objective:   Physical Exam  Vitals reviewed. Constitutional:       General: He is not in acute distress. Appearance: Normal appearance. He is normal weight. He is not diaphoretic. HENT:      Head: Normocephalic and atraumatic. Nose: Nose normal.   Eyes:      General: No scleral icterus. Extraocular Movements: Extraocular movements intact. Comments: + glasses     Cardiovascular:      Rate and Rhythm: Normal rate and regular rhythm. Pulses: Normal pulses. Heart sounds: Normal heart sounds. No murmur heard. Pulmonary:      Effort: Pulmonary effort is normal. No respiratory distress. Breath sounds: Normal breath sounds. No wheezing. Abdominal:      General: Bowel sounds are normal. There is no distension. Palpations: Abdomen is soft. Musculoskeletal:      Cervical back: Normal range of motion and neck supple. Right lower leg: No edema. Left lower leg: No edema. Lymphadenopathy:      Cervical: No cervical adenopathy. Skin:     General: Skin is warm and dry. Coloration: Skin is not jaundiced. Findings: No erythema or rash. Neurological:      Mental Status: He is alert and oriented to person, place, and time. Mental status is at baseline. Psychiatric:         Mood and Affect: Mood normal.         Behavior: Behavior normal.         Thought Content: Thought content normal.     /77 (BP Location: Left arm, Patient Position: Sitting, Cuff Size: Standard)   Pulse 91   Temp 97.9 °F (36.6 °C) (Temporal)   Resp 16   Ht 5' 9" (1.753 m)   Wt 85.9 kg (189 lb 6 oz)   SpO2 94%   BMI 27.97 kg/m²        NM PET CT skull base to mid thigh    Result Date: 7/18/2023  Narrative PET/CT SCAN INDICATION: C34.11: Malignant neoplasm of upper lobe, right bronchus or lung, metastatic right non-small cell lung cancer, status post neoadjuvant chemotherapy, resection of right frontal lobe mass. MODIFIER: PS COMPARISON: CTA chest June 27, 2023, MRI brain June 2023, PET/CT April 2023, CT chest abdomen and pelvis March 2023 CELL TYPE: Non-small cell neoplasm TECHNIQUE:   8.1 mCi F-18-FDG administered IV.  Multiplanar attenuation corrected and non attenuation corrected PET images are available for interpretation, and contiguous, low dose, axial CT sections were obtained from the vertex through the femurs. Intravenous contrast material was not utilized. This examination, like all CT scans performed in the [facility], was performed utilizing techniques to minimize radiation dose exposure, including the use of iterative reconstruction and automated exposure control. Fasting serum glucose: 108 mg/dl FINDINGS: Mediastinal blood flow SUV max 2 Hepatic parenchyma SUV max 2.7 VISUALIZED BRAIN: Postsurgical changes, status post right frontal craniotomy. HEAD/NECK: There is a physiologic distribution of FDG. No FDG avid cervical adenopathy is seen. CT images: Unremarkable. CHEST: Hypermetabolic mass is again demonstrated in the right upper lobe with SUV max of 19 compared to 20 on the previous exam image 107. Right upper paratracheal node is nonspecific with SUV max of 1.9. Size is unchanged. There is interval resolution of hypermetabolic right hilar adenopathy. CT images: Right portacatheter. Right upper lobe mass measures 3.5 x 3.8 cm series 3/107, compared to 4 x 3.9 cm. Right paratracheal nodes, measuring 8 to 10 mm are unchanged and were not showing definite increased metabolic activity. Right hilar adenopathy cannot be assessed on unenhanced exam. Coronary artery calcifications. Gynecomastia. ABDOMEN: No FDG avid soft tissue lesions are seen. CT images: Atherosclerotic disease of the abdominal aorta without aneurysmal dilatation. Fat-containing umbilical hernia. Fat-containing left inguinal hernia. PELVIS: No FDG avid soft tissue lesions are seen. CT images: Unremarkable. OSSEOUS STRUCTURES: No FDG avid lesions are seen. 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 Grade 1 anterolisthesis of L5 on S1. Impression 1. Interval resolution of metabolic activity in association with right hilar nodes with slightly decrease in size of right upper lobe mass.  2. No evidence for metastatic disease to the abdomen or pelvis. 3. Small right paratracheal nodes with very minimal activity which is not diagnostic for malignancy and unchanged.  Workstation performed: [id number]

## 2023-08-09 NOTE — ASSESSMENT & PLAN NOTE
We had a discussion with Brice Donato and his wife regarding his recent testing. His brain MRI was negative for further mets. His PET scan had a slight interval decrease in size of the right upper lobe lung mass, with decrease size of paratracheal nodes, and resolution of the hilar lymphadenopathy, which proves at least a partial response to therapy. Therefore, he is a candidate for surgical resection. This would include a right robotic assisted thoracoscopic upper lobectomy, possible right thoracotomy. He will need some blood work prior to the procedure, EKG is current. The procedure, possible risks, and post operative course were explained and all questions were answered. He is in agreement with the plan.

## 2023-08-09 NOTE — PROGRESS NOTES
I met with Dylan Zuniga at his visit with Dr. Jez Gary today. He's familiar with me from prior visits. Questions answered, support provided. He was given an IS and is familiar with how to use it.

## 2023-08-10 ENCOUNTER — OFFICE VISIT (OUTPATIENT)
Dept: HEMATOLOGY ONCOLOGY | Facility: CLINIC | Age: 68
End: 2023-08-10
Payer: COMMERCIAL

## 2023-08-10 VITALS
WEIGHT: 185 LBS | BODY MASS INDEX: 27.4 KG/M2 | RESPIRATION RATE: 18 BRPM | OXYGEN SATURATION: 95 % | HEIGHT: 69 IN | DIASTOLIC BLOOD PRESSURE: 80 MMHG | SYSTOLIC BLOOD PRESSURE: 140 MMHG | TEMPERATURE: 97.2 F | HEART RATE: 81 BPM

## 2023-08-10 DIAGNOSIS — C34.91 CARCINOMA OF RIGHT LUNG (HCC): Primary | ICD-10-CM

## 2023-08-10 DIAGNOSIS — C79.31 METASTASIS TO BRAIN (HCC): ICD-10-CM

## 2023-08-10 PROCEDURE — 99214 OFFICE O/P EST MOD 30 MIN: CPT | Performed by: INTERNAL MEDICINE

## 2023-08-10 NOTE — PROGRESS NOTES
Hematology/Oncology Outpatient Follow-up  Christin Neal 77 y.o. male 1955 [id number]    Date:  8/10/2023        Assessment and Plan:  1. Carcinoma of right lung ([address])  The patient had interval resolution of metabolically active right hilar nodes with slightly decrease right upper lobe mass after 4 cycles of neoadjuvant treatment with carboplatin/Alimta and nivolumab. He seems to be a surgical candidate for resection of his right lung cancer. We will see him after the planned surgery to discuss the pathology and the continuation of adjuvant immunotherapy most likely with nivolumab for a total of a year. 2. Metastasis to brain [facility])  He is status post surgical resection of the oligometastatic Brain lesion followed by postoperative RT with SRS/SRT in 5 fractions to avoid local recurrence. Mellissa Buchanan will be getting imaging, most likely with MRI on every 3 months basis to monitor for recurrence. HPI:    The patient came today for a follow-up visit accompanied by his wife. He had 4 cycles worth of neoadjuvant treatment with carbo/Alimta and nivolumab. He was seen yesterday by the thoracic surgical team who decided that the patient is a surgical candidate. Oncology History Overview Note    This is a 52-year-old male with history of prostate cancer status post prostatectomy in 2001.  History of stroke in 2011, hypertension, etc.  The patient presented to the hospital on 3/19/2023 with 1 week of progressive left-sided weakness.  CTA of the brain showed right frontal lobe cystic lesion with surrounding vasogenic edema consistent with metastatic disease.  The patient then had CT scan of the chest abdomen pelvis on 3/20/2023 which showed:  IMPRESSION:     3.8 x 3.5 cm right upper lobe lung mass with associated overlying pleural tag and adjacent to pleural thickening, this may be due to pleural reaction or mild pleural invasion     No contralateral lung nodules or mass     Right hilar lymphadenopathy.       Small lower right paratracheal left paratracheal lymph node do not meet the criteria for pathologic enlargement on the bases of size or morphology     There is no CT evidence of for metastatic disease in the abdomen and pelvis     No lytic destructive lesion in the visualized bony skeleton  The study was marked in EPIC for significant notification.   Bone scan was negative. MRI of the brain on 3/21/2023 showed:  IMPRESSION:     Unchanged 2.5 cm right parasagittal frontal lobe vertex mass with central necrosis and marked perilesional vasogenic edema unchanged.  Favor metastatic disease (given lung mass) over primary high-grade glioma.     Unchanged small subacute infarct in right frontal lobe.     Unchanged 0.2 cm leftward midline shift.     No new acute intracranial abnormality.     There are left-sided weakness improved with dexamethasone.  The patient then was taken to the OR on 3/23/2023 and had right frontal craniotomy and resection of the right parasagittal frontal lobe mass. The pathology was compatible with metastatic non-small cell cancer.  The molecular evaluation through [facility] showed PD-L1 of 100% with high TMB of 10.  No oncogenic  mutation was found.                 Oncology History   Metastatic adenocarcinoma ([address])   2023 Initial Diagnosis    Metastatic adenocarcinoma ([address])     3/23/2023 Biopsy    Brain, right frontal mass (biopsy):  - Metastatic non-small cell carcinoma     Comment:  - Tumor cells stain diffusely for CAM5.2, CKAE1/3, CK7, TTF1 and NapsinA with absent CK20, p63, CK5/6, p40 expression. This immunopanel favors a metastatic lung adenocarcinoma.        5/18/2023 -  Chemotherapy    cyanocobalamin, 1,000 mcg, Intramuscular, Once, 3 of 3 cycles  Administration: 1,000 mcg (6/29/2023), 1,000 mcg (5/18/2023), 1,000 mcg (7/20/2023)  alteplase (CATHFLO), 2 mg, Intracatheter, Every 1 Minute as needed, 4 of 4 cycles  pegfilgrastim (NEULASTA ONPRO), 6 mg, Subcutaneous, Once, 3 of 3 cycles  Administration: 6 mg (6/8/2023), 6 mg (6/29/2023), 6 mg (7/20/2023)  fosaprepitant (EMEND) IVPB, 150 mg, Intravenous, Once, 4 of 4 cycles  Administration: 150 mg (5/18/2023), 150 mg (6/29/2023), 150 mg (7/20/2023), 150 mg (6/8/2023)  nivolumab (OPDIVO) IVPB, 360 mg (150 % of original dose 240 mg), Intravenous, Once, 4 of 4 cycles  Dose modification: 360 mg (original dose 240 mg, Cycle 1, Reason: Dose modified as per discussion with consulting physician)  Administration: 360 mg (5/18/2023), 360 mg (6/8/2023), 360 mg (6/29/2023), 360 mg (7/20/2023)  CARBOplatin (PARAPLATIN) IVPB (GO AUC DOSING), 642.5 mg, Intravenous, Once, 4 of 4 cycles  Administration: 642.5 mg (5/18/2023), 642.5 mg (6/29/2023), 566.5 mg (7/20/2023), 650 mg (6/8/2023)  pemetrexed (ALIMTA) chemo infusion, 980 mg, Intravenous, Once, 4 of 4 cycles  Administration: 1,000 mg (5/18/2023), 1,000 mg (6/29/2023), 1,000 mg (7/20/2023), 1,000 mg (6/8/2023)     Carcinoma of right lung ([address])   4/13/2023 Initial Diagnosis    Carcinoma of right lung ([address])     4/13/2023 -  Cancer Staged    Staging form: Lung, AJCC 8th Edition  - Clinical: Stage JAY (cT3, cN1, cM1b) - Signed by Fara Brittle, MD on 4/13/2023 4/19/2023 - 4/28/2023 Radiation    Plan ID Energy Fractions Dose per Fraction (cGy) Dose Correction (cGy) Total Dose Delivered (cGy) Elapsed Days   SRT R Frontal 6X-FFF 5 / 5 600 0 3,000 9         5/18/2023 -  Chemotherapy    cyanocobalamin, 1,000 mcg, Intramuscular, Once, 3 of 3 cycles  Administration: 1,000 mcg (6/29/2023), 1,000 mcg (5/18/2023), 1,000 mcg (7/20/2023)  alteplase (CATHFLO), 2 mg, Intracatheter, Every 1 Minute as needed, 4 of 4 cycles  pegfilgrastim ([employer] Know[employer]), 6 mg, Subcutaneous, Once, 3 of 3 cycles  Administration: 6 mg (6/8/2023), 6 mg (6/29/2023), 6 mg (7/20/2023)  fosaprepitant (EMEND) IVPB, 150 mg, Intravenous, Once, 4 of 4 cycles  Administration: 150 mg (5/18/2023), 150 mg (6/29/2023), 150 mg (7/20/2023), 150 mg (6/8/2023)  nivolumab (OPDIVO) IVPB, 360 mg (150 % of original dose 240 mg), Intravenous, Once, 4 of 4 cycles  Dose modification: 360 mg (original dose 240 mg, Cycle 1, Reason: Dose modified as per discussion with consulting physician)  Administration: 360 mg (5/18/2023), 360 mg (6/8/2023), 360 mg (6/29/2023), 360 mg (7/20/2023)  CARBOplatin (PARAPLATIN) IVPB ([facility] AUC DOSING), 642.5 mg, Intravenous, Once, 4 of 4 cycles  Administration: 642.5 mg (5/18/2023), 642.5 mg (6/29/2023), 566.5 mg (7/20/2023), 650 mg (6/8/2023)  pemetrexed (ALIMTA) chemo infusion, 980 mg, Intravenous, Once, 4 of 4 cycles  Administration: 1,000 mg (5/18/2023), 1,000 mg (6/29/2023), 1,000 mg (7/20/2023), 1,000 mg (6/8/2023)         Interval history:    ROS: Review of Systems   Constitutional: Positive for fatigue. Negative for chills and fever. HENT: Negative for ear pain and sore throat. Eyes: Negative for pain and visual disturbance. Respiratory: Positive for cough and shortness of breath. Cardiovascular: Negative for chest pain and palpitations. Gastrointestinal: Negative for abdominal pain and vomiting. Genitourinary: Negative for dysuria and hematuria. Musculoskeletal: Negative for arthralgias and back pain. Skin: Negative for color change and rash. Neurological: Positive for dizziness and numbness. Negative for seizures and syncope. Psychiatric/Behavioral: Positive for sleep disturbance. All other systems reviewed and are negative.       Past Medical History:   Diagnosis Date   • Cancer ([address]) 2001    Receint lung and brain lesions and prostate cancer in 2001   • Hypertension    • Prostate cancer ([address]) 2001   • Rectal bleeding    • Stroke [facility])     2011         Past Surgical History:   Procedure Laterality Date   • COLONOSCOPY     • CRANIOTOMY Right 3/23/2023    Procedure: Right frontal CRANIOTOMY IMAGE-GUIDED FOR TUMOR;  Surgeon: Robert Aviles MD;  Location: [facility];  Service: Neurosurgery   • IR PORT PLACEMENT  2023   • WA CYSTOURETHROSCOPY N/A 3/23/2023    Procedure: EUA, DEL CASTILLO INSERTION;  Surgeon: Elda Da Silva MD;  Location: [facility];  Service: Urology   • PROSTATECTOMY     • TONSILLECTOMY         Social History     Socioeconomic History   • Marital status: /Civil Union     Spouse name: None   • Number of children: None   • Years of education: None   • Highest education level: None   Occupational History   • None   Tobacco Use   • Smoking status: Former     Packs/day: 1.00     Years: 15.00     Total pack years: 15.00     Types: Cigarettes     Quit date: 44     Years since quittin.6     Passive exposure: Never   • Smokeless tobacco: Never   • Tobacco comments:     i quit when i was 28. not sure of exact dates   Vaping Use   • Vaping Use: Never used   Substance and Sexual Activity   • Alcohol use: Not Currently     Alcohol/week: 6.0 standard drinks of alcohol     Types: 6 Cans of beer per week     Comment: socially   • Drug use: Not Currently     Types: Marijuana   • Sexual activity: Yes     Partners: Female     Birth control/protection: None   Other Topics Concern   • None   Social History Narrative   • None     Social Determinants of Health     Financial Resource Strain: Not on file   Food Insecurity: No Food Insecurity (3/20/2023)    Hunger Vital Sign    • Worried About Running Out of Food in the Last Year: Never true    • Ran Out of Food in the Last Year: Never true   Transportation Needs: No Transportation Needs (3/20/2023)    PRAPARE - Transportation    • Lack of Transportation (Medical): No    • Lack of Transportation (Non-Medical):  No   Physical Activity: Not on file   Stress: Not on file   Social Connections: Not on file   Intimate Partner Violence: Not on file   Housing Stability: Low Risk  (3/20/2023)    Housing Stability Vital Sign    • Unable to Pay for Housing in the Last Year: No    • Number of Places Lived in the Last Year: 1    • Unstable Housing in the Last Year: No Family History   Problem Relation Age of Onset   • Dementia Mother            • Breast cancer Sister            • Prostate cancer Brother         Received Radiation       No Known Allergies      Current Outpatient Medications:   •  acetaminophen (TYLENOL) 325 mg tablet, Take 3 tablets (975 mg total) by mouth every 8 (eight) hours as needed for mild pain or headaches, Disp: 30 tablet, Rfl: 0  •  amLODIPine (NORVASC) 10 mg tablet, Take 1 tablet (10 mg total) by mouth daily, Disp: 90 tablet, Rfl: 3  •  folic acid (KP Folic Acid) 1 mg tablet, Take 1 tablet (1 mg total) by mouth daily, Disp: 30 tablet, Rfl: 11  •  losartan (COZAAR) 50 mg tablet, Take 1 tablet (50 mg total) by mouth daily, Disp: 90 tablet, Rfl: 3  •  Magnesium Oxide, Elemental, 400 MG TABS, Take 400 mg by mouth in the morning, Disp: 30 tablet, Rfl: 6  •  ondansetron (ZOFRAN) 8 mg tablet, Take 1 tablet (8 mg total) by mouth every 8 (eight) hours as needed for nausea or vomiting, Disp: 20 tablet, Rfl: 3  •  atorvastatin (LIPITOR) 40 mg tablet, Take 1 tablet (40 mg total) by mouth daily after dinner, Disp: 30 tablet, Rfl: 0      Physical Exam:  /80 (BP Location: Left arm, Patient Position: Sitting, Cuff Size: Adult)   Pulse 81   Temp (!) 97.2 °F (36.2 °C)   Resp 18   Ht 5' 9" (1.753 m)   Wt 83.9 kg (185 lb)   SpO2 95%   BMI 27.32 kg/m²     Physical Exam  Constitutional:       Appearance: He is well-developed. HENT:      Head: Normocephalic and atraumatic. Nose: Nose normal.   Eyes:      General: No scleral icterus. Right eye: No discharge. Left eye: No discharge. Conjunctiva/sclera: Conjunctivae normal.      Pupils: Pupils are equal, round, and reactive to light. Neck:      Thyroid: No thyromegaly. Trachea: No tracheal deviation. Cardiovascular:      Rate and Rhythm: Normal rate and regular rhythm. Heart sounds: Normal heart sounds. No murmur heard. No friction rub.    Pulmonary: Effort: Pulmonary effort is normal. No respiratory distress. Breath sounds: Normal breath sounds. No wheezing or rales. Chest:      Chest wall: No tenderness. Abdominal:      General: There is no distension. Palpations: Abdomen is soft. There is no hepatomegaly or splenomegaly. Tenderness: There is no abdominal tenderness. There is no guarding or rebound. Musculoskeletal:         General: No tenderness or deformity. Normal range of motion. Cervical back: Normal range of motion and neck supple. Lymphadenopathy:      Cervical: No cervical adenopathy. Skin:     General: Skin is warm and dry. Coloration: Skin is not pale. Findings: No erythema or rash. Neurological:      Mental Status: He is alert and oriented to person, place, and time. Cranial Nerves: No cranial nerve deficit. Coordination: Coordination normal.      Deep Tendon Reflexes: Reflexes are normal and symmetric. Psychiatric:         Behavior: Behavior normal.         Thought Content: Thought content normal.         Judgment: Judgment normal.           Labs:  Lab Results   Component Value Date    WBC 7.07 07/19/2023    HGB 10.8 (L) 07/19/2023    HCT 34.6 (L) 07/19/2023    MCV 92 07/19/2023     07/19/2023     Lab Results   Component Value Date     05/10/2018    K 3.6 07/19/2023     07/19/2023    CO2 28 07/19/2023    ANIONGAP 11.6 05/10/2018    BUN 12 07/19/2023    CREATININE 0.82 07/19/2023    GLUF 116 (H) 06/27/2023    CALCIUM 8.7 07/19/2023    AST 15 07/19/2023    ALT 10 07/19/2023    ALKPHOS 79 07/19/2023    PROT 7.2 05/10/2018    BILITOT 0.8 05/10/2018    EGFR 89 07/19/2023     No results found for: "TSH"    Patient voiced understanding and agreement in the above discussion. Aware to contact our office with questions/symptoms in the interim.

## 2023-08-18 ENCOUNTER — ANESTHESIA EVENT (OUTPATIENT)
Dept: PERIOP | Facility: HOSPITAL | Age: 68
DRG: 164 | End: 2023-08-18
Payer: COMMERCIAL

## 2023-08-18 NOTE — PRE-PROCEDURE INSTRUCTIONS
Pre-Surgery Instructions:   Medication Instructions   • acetaminophen (TYLENOL) 325 mg tablet Uses PRN- OK to take day of surgery   • amLODIPine (NORVASC) 10 mg tablet Take day of surgery. • atorvastatin (LIPITOR) 40 mg tablet Take day of surgery. • folic acid (KP Folic Acid) 1 mg tablet Hold day of surgery. • losartan (COZAAR) 50 mg tablet Hold day of surgery. • Magnesium Oxide, Elemental, 400 MG TABS Hold day of surgery. Medication instructions for day surgery reviewed. Please use only a sip of water to take your instructed medications. Avoid all over the counter vitamins, supplements and NSAIDS for one week prior to surgery per anesthesia guidelines. Tylenol is ok to take as needed. You will receive a call one business day prior to surgery with an arrival time and hospital directions. If your surgery is scheduled on a Monday, the hospital will be calling you on the Friday prior to your surgery. If you have not heard from anyone by 8pm, please call the hospital supervisor through the hospital  at [phone]. Caridad David [phone]). Do not eat or drink anything after midnight the night before your surgery, including candy, mints, lifesavers, or chewing gum. Do not drink alcohol 24hrs before your surgery. Try not to smoke at least 24hrs before your surgery. Follow the pre surgery showering instructions as listed in the [facility] Surgical Experience Booklet” or otherwise provided by your surgeon's office. Do not shave the surgical area 24 hours before surgery. Do not apply any lotions, creams, including makeup, cologne, deodorant, or perfumes after showering on the day of your surgery. No contact lenses, eye make-up, or artificial eyelashes. Remove nail polish, including gel polish, and any artificial, gel, or acrylic nails if possible. Remove all jewelry including rings and body piercing jewelry. Wear causal clothing that is easy to take on and off.  Consider your type of surgery. Keep any valuables, jewelry, piercings at home. Please bring any specially ordered equipment (sling, braces) if indicated. Arrange for a responsible person to drive you to and from the hospital on the day of your surgery. Visitor Guidelines discussed. Call the surgeon's office with any new illnesses, exposures, or additional questions prior to surgery. Please reference your [facility] Surgical Experience Booklet” for additional information to prepare for your upcoming surgery.  Carb drink x3

## 2023-08-21 ENCOUNTER — HOSPITAL ENCOUNTER (OUTPATIENT)
Dept: INFUSION CENTER | Facility: HOSPITAL | Age: 68
Discharge: HOME | End: 2023-08-21

## 2023-08-30 ENCOUNTER — HOSPITAL ENCOUNTER (OUTPATIENT)
Dept: INFUSION CENTER | Facility: HOSPITAL | Age: 68
Discharge: HOME | End: 2023-08-30
Attending: INTERNAL MEDICINE
Payer: COMMERCIAL

## 2023-08-30 ENCOUNTER — LAB REQUISITION (OUTPATIENT)
Dept: LAB | Facility: HOSPITAL | Age: 68
End: 2023-08-30
Payer: COMMERCIAL

## 2023-08-30 VITALS — TEMPERATURE: 97.7 F

## 2023-08-30 DIAGNOSIS — C79.9 METASTATIC ADENOCARCINOMA (HCC): Primary | ICD-10-CM

## 2023-08-30 DIAGNOSIS — C34.91 MALIGNANT NEOPLASM OF UNSPECIFIED PART OF RIGHT BRONCHUS OR LUNG (HCC): ICD-10-CM

## 2023-08-30 DIAGNOSIS — C34.91 CARCINOMA OF RIGHT LUNG (HCC): ICD-10-CM

## 2023-08-30 DIAGNOSIS — Z45.2 ENCOUNTER FOR CENTRAL LINE CARE: ICD-10-CM

## 2023-08-30 LAB
ABO GROUP BLD: NORMAL
APTT PPP: 26 SECONDS (ref 23–37)
BLD GP AB SCN SERPL QL: NEGATIVE
INR PPP: 0.98 (ref 0.84–1.19)
PROTHROMBIN TIME: 13.2 SECONDS (ref 11.6–14.5)
RH BLD: POSITIVE
SPECIMEN EXPIRATION DATE: NORMAL

## 2023-08-30 PROCEDURE — 86900 BLOOD TYPING SEROLOGIC ABO: CPT | Performed by: PHYSICIAN ASSISTANT

## 2023-08-30 PROCEDURE — 85610 PROTHROMBIN TIME: CPT

## 2023-08-30 PROCEDURE — 86901 BLOOD TYPING SEROLOGIC RH(D): CPT | Performed by: PHYSICIAN ASSISTANT

## 2023-08-30 PROCEDURE — 85730 THROMBOPLASTIN TIME PARTIAL: CPT

## 2023-08-30 PROCEDURE — 86850 RBC ANTIBODY SCREEN: CPT | Performed by: PHYSICIAN ASSISTANT

## 2023-08-30 NOTE — PROGRESS NOTES
Labs for surgery scheduled 9/1 obtained via [facility] port without difficulty. Port flushed per protocol. Discharged in stable condition.

## 2023-09-01 ENCOUNTER — APPOINTMENT (OUTPATIENT)
Dept: RADIOLOGY | Facility: HOSPITAL | Age: 68
DRG: 164 | End: 2023-09-01
Payer: COMMERCIAL

## 2023-09-01 ENCOUNTER — ANESTHESIA (OUTPATIENT)
Dept: PERIOP | Facility: HOSPITAL | Age: 68
DRG: 164 | End: 2023-09-01
Payer: COMMERCIAL

## 2023-09-01 ENCOUNTER — HOSPITAL ENCOUNTER (INPATIENT)
Facility: HOSPITAL | Age: 68
LOS: 4 days | Discharge: HOME HEALTH | DRG: 164 | End: 2023-09-05
Attending: THORACIC SURGERY (CARDIOTHORACIC VASCULAR SURGERY) | Admitting: THORACIC SURGERY (CARDIOTHORACIC VASCULAR SURGERY)
Payer: COMMERCIAL

## 2023-09-01 DIAGNOSIS — C34.91 CARCINOMA OF RIGHT LUNG (HCC): ICD-10-CM

## 2023-09-01 DIAGNOSIS — G93.89 BRAIN MASS: ICD-10-CM

## 2023-09-01 DIAGNOSIS — R91.1 LUNG NODULE: Primary | ICD-10-CM

## 2023-09-01 LAB
ABO GROUP BLD: NORMAL
BASE EXCESS BLDA CALC-SCNC: -1 MMOL/L (ref -2–3)
BASE EXCESS BLDA CALC-SCNC: -2 MMOL/L (ref -2–3)
BLD GP AB SCN SERPL QL: NEGATIVE
CA-I BLD-SCNC: 1.13 MMOL/L (ref 1.12–1.32)
CA-I BLD-SCNC: 1.14 MMOL/L (ref 1.12–1.32)
GLUCOSE SERPL-MCNC: 190 MG/DL (ref 65–140)
GLUCOSE SERPL-MCNC: 193 MG/DL (ref 65–140)
HCO3 BLDA-SCNC: 25.6 MMOL/L (ref 24–30)
HCO3 BLDA-SCNC: 26.6 MMOL/L (ref 24–30)
HCT VFR BLD CALC: 31 % (ref 36.5–49.3)
HCT VFR BLD CALC: 31 % (ref 36.5–49.3)
HGB BLDA-MCNC: 10.5 G/DL (ref 12–17)
HGB BLDA-MCNC: 10.5 G/DL (ref 12–17)
PCO2 BLD: 27 MMOL/L (ref 21–32)
PCO2 BLD: 28 MMOL/L (ref 21–32)
PCO2 BLD: 56.9 MM HG (ref 42–50)
PCO2 BLD: 57.7 MM HG (ref 42–50)
PH BLD: 7.26 [PH] (ref 7.3–7.4)
PH BLD: 7.27 [PH] (ref 7.3–7.4)
PLATELET # BLD AUTO: 237 THOUSANDS/UL (ref 149–390)
PMV BLD AUTO: 9.1 FL (ref 8.9–12.7)
PO2 BLD: 77 MM HG (ref 35–45)
PO2 BLD: 79 MM HG (ref 35–45)
POTASSIUM BLD-SCNC: 4.5 MMOL/L (ref 3.5–5.3)
POTASSIUM BLD-SCNC: 4.6 MMOL/L (ref 3.5–5.3)
RH BLD: POSITIVE
SAO2 % BLD FROM PO2: 93 % (ref 60–85)
SAO2 % BLD FROM PO2: 93 % (ref 60–85)
SODIUM BLD-SCNC: 139 MMOL/L (ref 136–145)
SODIUM BLD-SCNC: 140 MMOL/L (ref 136–145)
SPECIMEN EXPIRATION DATE: NORMAL
SPECIMEN SOURCE: ABNORMAL
SPECIMEN SOURCE: ABNORMAL

## 2023-09-01 PROCEDURE — 38746 REMOVE THORACIC LYMPH NODES: CPT | Performed by: THORACIC SURGERY (CARDIOTHORACIC VASCULAR SURGERY)

## 2023-09-01 PROCEDURE — 07B70ZX EXCISION OF THORAX LYMPHATIC, OPEN APPROACH, DIAGNOSTIC: ICD-10-PCS | Performed by: THORACIC SURGERY (CARDIOTHORACIC VASCULAR SURGERY)

## 2023-09-01 PROCEDURE — 86900 BLOOD TYPING SEROLOGIC ABO: CPT | Performed by: THORACIC SURGERY (CARDIOTHORACIC VASCULAR SURGERY)

## 2023-09-01 PROCEDURE — 86923 COMPATIBILITY TEST ELECTRIC: CPT

## 2023-09-01 PROCEDURE — 0BJ08ZZ INSPECTION OF TRACHEOBRONCHIAL TREE, VIA NATURAL OR ARTIFICIAL OPENING ENDOSCOPIC: ICD-10-PCS | Performed by: THORACIC SURGERY (CARDIOTHORACIC VASCULAR SURGERY)

## 2023-09-01 PROCEDURE — 82330 ASSAY OF CALCIUM: CPT

## 2023-09-01 PROCEDURE — 88342 IMHCHEM/IMCYTCHM 1ST ANTB: CPT | Performed by: PATHOLOGY

## 2023-09-01 PROCEDURE — 85049 AUTOMATED PLATELET COUNT: CPT | Performed by: SURGERY

## 2023-09-01 PROCEDURE — 31622 DX BRONCHOSCOPE/WASH: CPT | Performed by: THORACIC SURGERY (CARDIOTHORACIC VASCULAR SURGERY)

## 2023-09-01 PROCEDURE — 32480 PARTIAL REMOVAL OF LUNG: CPT | Performed by: SURGERY

## 2023-09-01 PROCEDURE — 86850 RBC ANTIBODY SCREEN: CPT | Performed by: THORACIC SURGERY (CARDIOTHORACIC VASCULAR SURGERY)

## 2023-09-01 PROCEDURE — C9290 INJ, BUPIVACAINE LIPOSOME: HCPCS | Performed by: THORACIC SURGERY (CARDIOTHORACIC VASCULAR SURGERY)

## 2023-09-01 PROCEDURE — 88341 IMHCHEM/IMCYTCHM EA ADD ANTB: CPT | Performed by: PATHOLOGY

## 2023-09-01 PROCEDURE — 88305 TISSUE EXAM BY PATHOLOGIST: CPT | Performed by: PATHOLOGY

## 2023-09-01 PROCEDURE — 88312 SPECIAL STAINS GROUP 1: CPT | Performed by: PATHOLOGY

## 2023-09-01 PROCEDURE — 84295 ASSAY OF SERUM SODIUM: CPT

## 2023-09-01 PROCEDURE — 88313 SPECIAL STAINS GROUP 2: CPT | Performed by: PATHOLOGY

## 2023-09-01 PROCEDURE — 84132 ASSAY OF SERUM POTASSIUM: CPT

## 2023-09-01 PROCEDURE — C2615 SEALANT, PULMONARY, LIQUID: HCPCS | Performed by: THORACIC SURGERY (CARDIOTHORACIC VASCULAR SURGERY)

## 2023-09-01 PROCEDURE — 71045 X-RAY EXAM CHEST 1 VIEW: CPT

## 2023-09-01 PROCEDURE — 82947 ASSAY GLUCOSE BLOOD QUANT: CPT

## 2023-09-01 PROCEDURE — 88309 TISSUE EXAM BY PATHOLOGIST: CPT | Performed by: PATHOLOGY

## 2023-09-01 PROCEDURE — 86901 BLOOD TYPING SEROLOGIC RH(D): CPT | Performed by: THORACIC SURGERY (CARDIOTHORACIC VASCULAR SURGERY)

## 2023-09-01 PROCEDURE — 0BTC0ZZ RESECTION OF RIGHT UPPER LUNG LOBE, OPEN APPROACH: ICD-10-PCS | Performed by: THORACIC SURGERY (CARDIOTHORACIC VASCULAR SURGERY)

## 2023-09-01 PROCEDURE — 85014 HEMATOCRIT: CPT

## 2023-09-01 PROCEDURE — 32480 PARTIAL REMOVAL OF LUNG: CPT | Performed by: THORACIC SURGERY (CARDIOTHORACIC VASCULAR SURGERY)

## 2023-09-01 PROCEDURE — 38746 REMOVE THORACIC LYMPH NODES: CPT | Performed by: SURGERY

## 2023-09-01 PROCEDURE — 82803 BLOOD GASES ANY COMBINATION: CPT

## 2023-09-01 PROCEDURE — C2618 PROBE/NEEDLE, CRYO: HCPCS | Performed by: THORACIC SURGERY (CARDIOTHORACIC VASCULAR SURGERY)

## 2023-09-01 RX ORDER — FENTANYL CITRATE/PF 50 MCG/ML
50 SYRINGE (ML) INJECTION
Status: DISCONTINUED | OUTPATIENT
Start: 2023-09-01 | End: 2023-09-01 | Stop reason: HOSPADM

## 2023-09-01 RX ORDER — MIDAZOLAM HYDROCHLORIDE 2 MG/2ML
INJECTION, SOLUTION INTRAMUSCULAR; INTRAVENOUS AS NEEDED
Status: DISCONTINUED | OUTPATIENT
Start: 2023-09-01 | End: 2023-09-01

## 2023-09-01 RX ORDER — OXYCODONE HYDROCHLORIDE 5 MG/1
5 TABLET ORAL EVERY 4 HOURS PRN
Status: DISCONTINUED | OUTPATIENT
Start: 2023-09-01 | End: 2023-09-05 | Stop reason: HOSPADM

## 2023-09-01 RX ORDER — LIDOCAINE HYDROCHLORIDE 20 MG/ML
INJECTION, SOLUTION EPIDURAL; INFILTRATION; INTRACAUDAL; PERINEURAL AS NEEDED
Status: DISCONTINUED | OUTPATIENT
Start: 2023-09-01 | End: 2023-09-01

## 2023-09-01 RX ORDER — SODIUM CHLORIDE 9 MG/ML
INJECTION, SOLUTION INTRAVENOUS CONTINUOUS PRN
Status: DISCONTINUED | OUTPATIENT
Start: 2023-09-01 | End: 2023-09-01

## 2023-09-01 RX ORDER — ENOXAPARIN SODIUM 100 MG/ML
40 INJECTION SUBCUTANEOUS DAILY
Status: DISCONTINUED | OUTPATIENT
Start: 2023-09-02 | End: 2023-09-05 | Stop reason: HOSPADM

## 2023-09-01 RX ORDER — CEFAZOLIN SODIUM 2 G/50ML
2000 SOLUTION INTRAVENOUS ONCE
Status: COMPLETED | OUTPATIENT
Start: 2023-09-01 | End: 2023-09-01

## 2023-09-01 RX ORDER — ACETAMINOPHEN 325 MG/1
975 TABLET ORAL EVERY 6 HOURS SCHEDULED
Status: DISCONTINUED | OUTPATIENT
Start: 2023-09-01 | End: 2023-09-05 | Stop reason: HOSPADM

## 2023-09-01 RX ORDER — HYDROMORPHONE HCL/PF 1 MG/ML
SYRINGE (ML) INJECTION AS NEEDED
Status: DISCONTINUED | OUTPATIENT
Start: 2023-09-01 | End: 2023-09-01

## 2023-09-01 RX ORDER — ALBUMIN, HUMAN INJ 5% 5 %
SOLUTION INTRAVENOUS CONTINUOUS PRN
Status: DISCONTINUED | OUTPATIENT
Start: 2023-09-01 | End: 2023-09-01

## 2023-09-01 RX ORDER — ONDANSETRON 2 MG/ML
4 INJECTION INTRAMUSCULAR; INTRAVENOUS EVERY 6 HOURS PRN
Status: DISCONTINUED | OUTPATIENT
Start: 2023-09-01 | End: 2023-09-05 | Stop reason: HOSPADM

## 2023-09-01 RX ORDER — FENTANYL CITRATE 50 UG/ML
INJECTION, SOLUTION INTRAMUSCULAR; INTRAVENOUS AS NEEDED
Status: DISCONTINUED | OUTPATIENT
Start: 2023-09-01 | End: 2023-09-01

## 2023-09-01 RX ORDER — HEPARIN SODIUM 5000 [USP'U]/ML
5000 INJECTION, SOLUTION INTRAVENOUS; SUBCUTANEOUS
Status: COMPLETED | OUTPATIENT
Start: 2023-09-01 | End: 2023-09-01

## 2023-09-01 RX ORDER — SODIUM CHLORIDE, SODIUM LACTATE, POTASSIUM CHLORIDE, CALCIUM CHLORIDE 600; 310; 30; 20 MG/100ML; MG/100ML; MG/100ML; MG/100ML
INJECTION, SOLUTION INTRAVENOUS CONTINUOUS PRN
Status: DISCONTINUED | OUTPATIENT
Start: 2023-09-01 | End: 2023-09-01

## 2023-09-01 RX ORDER — DEXAMETHASONE SODIUM PHOSPHATE 10 MG/ML
INJECTION, SOLUTION INTRAMUSCULAR; INTRAVENOUS AS NEEDED
Status: DISCONTINUED | OUTPATIENT
Start: 2023-09-01 | End: 2023-09-01

## 2023-09-01 RX ORDER — PROPOFOL 10 MG/ML
INJECTION, EMULSION INTRAVENOUS AS NEEDED
Status: DISCONTINUED | OUTPATIENT
Start: 2023-09-01 | End: 2023-09-01

## 2023-09-01 RX ORDER — HYDROMORPHONE HCL/PF 1 MG/ML
0.5 SYRINGE (ML) INJECTION
Status: DISCONTINUED | OUTPATIENT
Start: 2023-09-01 | End: 2023-09-05 | Stop reason: HOSPADM

## 2023-09-01 RX ORDER — ACETAMINOPHEN 325 MG/1
975 TABLET ORAL ONCE
Status: COMPLETED | OUTPATIENT
Start: 2023-09-01 | End: 2023-09-01

## 2023-09-01 RX ORDER — HYDROMORPHONE HCL/PF 1 MG/ML
0.5 SYRINGE (ML) INJECTION
Status: DISCONTINUED | OUTPATIENT
Start: 2023-09-01 | End: 2023-09-01 | Stop reason: HOSPADM

## 2023-09-01 RX ORDER — KETAMINE HCL IN NACL, ISO-OSM 100MG/10ML
SYRINGE (ML) INJECTION AS NEEDED
Status: DISCONTINUED | OUTPATIENT
Start: 2023-09-01 | End: 2023-09-01

## 2023-09-01 RX ORDER — ONDANSETRON 2 MG/ML
4 INJECTION INTRAMUSCULAR; INTRAVENOUS ONCE AS NEEDED
Status: DISCONTINUED | OUTPATIENT
Start: 2023-09-01 | End: 2023-09-01 | Stop reason: HOSPADM

## 2023-09-01 RX ORDER — GABAPENTIN 300 MG/1
600 CAPSULE ORAL ONCE
Status: COMPLETED | OUTPATIENT
Start: 2023-09-01 | End: 2023-09-01

## 2023-09-01 RX ORDER — ROCURONIUM BROMIDE 10 MG/ML
INJECTION, SOLUTION INTRAVENOUS AS NEEDED
Status: DISCONTINUED | OUTPATIENT
Start: 2023-09-01 | End: 2023-09-01

## 2023-09-01 RX ORDER — GABAPENTIN 300 MG/1
300 CAPSULE ORAL 3 TIMES DAILY
Status: DISCONTINUED | OUTPATIENT
Start: 2023-09-01 | End: 2023-09-02

## 2023-09-01 RX ORDER — ONDANSETRON 2 MG/ML
INJECTION INTRAMUSCULAR; INTRAVENOUS AS NEEDED
Status: DISCONTINUED | OUTPATIENT
Start: 2023-09-01 | End: 2023-09-01

## 2023-09-01 RX ORDER — MAGNESIUM HYDROXIDE 1200 MG/15ML
LIQUID ORAL AS NEEDED
Status: DISCONTINUED | OUTPATIENT
Start: 2023-09-01 | End: 2023-09-01 | Stop reason: HOSPADM

## 2023-09-01 RX ORDER — AMLODIPINE BESYLATE 10 MG/1
10 TABLET ORAL DAILY
Status: DISCONTINUED | OUTPATIENT
Start: 2023-09-02 | End: 2023-09-05 | Stop reason: HOSPADM

## 2023-09-01 RX ORDER — ATORVASTATIN CALCIUM 40 MG/1
40 TABLET, FILM COATED ORAL
Status: DISCONTINUED | OUTPATIENT
Start: 2023-09-01 | End: 2023-09-05 | Stop reason: HOSPADM

## 2023-09-01 RX ORDER — OXYCODONE HYDROCHLORIDE 10 MG/1
10 TABLET ORAL EVERY 4 HOURS PRN
Status: DISCONTINUED | OUTPATIENT
Start: 2023-09-01 | End: 2023-09-05 | Stop reason: HOSPADM

## 2023-09-01 RX ORDER — PROMETHAZINE HYDROCHLORIDE 25 MG/ML
25 INJECTION, SOLUTION INTRAMUSCULAR; INTRAVENOUS ONCE AS NEEDED
Status: DISCONTINUED | OUTPATIENT
Start: 2023-09-01 | End: 2023-09-01 | Stop reason: HOSPADM

## 2023-09-01 RX ADMIN — CEFAZOLIN SODIUM 2000 MG: 2 SOLUTION INTRAVENOUS at 11:38

## 2023-09-01 RX ADMIN — ROCURONIUM BROMIDE 50 MG: 10 INJECTION, SOLUTION INTRAVENOUS at 07:34

## 2023-09-01 RX ADMIN — FENTANYL CITRATE 50 MCG: 50 INJECTION INTRAMUSCULAR; INTRAVENOUS at 14:31

## 2023-09-01 RX ADMIN — ROCURONIUM BROMIDE 20 MG: 10 INJECTION, SOLUTION INTRAVENOUS at 08:11

## 2023-09-01 RX ADMIN — CEFAZOLIN SODIUM 2000 MG: 2 SOLUTION INTRAVENOUS at 07:51

## 2023-09-01 RX ADMIN — SODIUM CHLORIDE, SODIUM LACTATE, POTASSIUM CHLORIDE, AND CALCIUM CHLORIDE: .6; .31; .03; .02 INJECTION, SOLUTION INTRAVENOUS at 07:11

## 2023-09-01 RX ADMIN — FENTANYL CITRATE 50 MCG: 50 INJECTION INTRAMUSCULAR; INTRAVENOUS at 07:34

## 2023-09-01 RX ADMIN — ALBUMIN (HUMAN): 12.5 INJECTION, SOLUTION INTRAVENOUS at 11:23

## 2023-09-01 RX ADMIN — GABAPENTIN 600 MG: 300 CAPSULE ORAL at 05:57

## 2023-09-01 RX ADMIN — ALBUMIN (HUMAN): 12.5 INJECTION, SOLUTION INTRAVENOUS at 08:23

## 2023-09-01 RX ADMIN — ACETAMINOPHEN 975 MG: 325 TABLET, FILM COATED ORAL at 17:54

## 2023-09-01 RX ADMIN — SODIUM CHLORIDE: 0.9 INJECTION, SOLUTION INTRAVENOUS at 07:37

## 2023-09-01 RX ADMIN — Medication 10 MG: at 11:15

## 2023-09-01 RX ADMIN — SODIUM CHLORIDE: 0.9 INJECTION, SOLUTION INTRAVENOUS at 13:00

## 2023-09-01 RX ADMIN — ONDANSETRON 4 MG: 2 INJECTION INTRAMUSCULAR; INTRAVENOUS at 12:47

## 2023-09-01 RX ADMIN — DEXAMETHASONE SODIUM PHOSPHATE 10 MG: 10 INJECTION, SOLUTION INTRAMUSCULAR; INTRAVENOUS at 07:34

## 2023-09-01 RX ADMIN — ATORVASTATIN CALCIUM 40 MG: 40 TABLET, FILM COATED ORAL at 17:55

## 2023-09-01 RX ADMIN — HYDROMORPHONE HYDROCHLORIDE 0.5 MG: 1 INJECTION, SOLUTION INTRAMUSCULAR; INTRAVENOUS; SUBCUTANEOUS at 10:36

## 2023-09-01 RX ADMIN — FENTANYL CITRATE 50 MCG: 50 INJECTION INTRAMUSCULAR; INTRAVENOUS at 13:48

## 2023-09-01 RX ADMIN — PROPOFOL 160 MG: 10 INJECTION, EMULSION INTRAVENOUS at 07:34

## 2023-09-01 RX ADMIN — ACETAMINOPHEN 975 MG: 325 TABLET, FILM COATED ORAL at 05:57

## 2023-09-01 RX ADMIN — SODIUM CHLORIDE, SODIUM LACTATE, POTASSIUM CHLORIDE, AND CALCIUM CHLORIDE: .6; .31; .03; .02 INJECTION, SOLUTION INTRAVENOUS at 13:00

## 2023-09-01 RX ADMIN — PHENYLEPHRINE HYDROCHLORIDE 50 MCG/MIN: 10 INJECTION INTRAVENOUS at 08:17

## 2023-09-01 RX ADMIN — GABAPENTIN 300 MG: 300 CAPSULE ORAL at 21:37

## 2023-09-01 RX ADMIN — SUGAMMADEX 200 MG: 100 INJECTION, SOLUTION INTRAVENOUS at 13:39

## 2023-09-01 RX ADMIN — MIDAZOLAM 2 MG: 1 INJECTION INTRAMUSCULAR; INTRAVENOUS at 07:23

## 2023-09-01 RX ADMIN — ROCURONIUM BROMIDE 20 MG: 10 INJECTION, SOLUTION INTRAVENOUS at 10:22

## 2023-09-01 RX ADMIN — HEPARIN SODIUM 5000 UNITS: 5000 INJECTION INTRAVENOUS; SUBCUTANEOUS at 07:06

## 2023-09-01 RX ADMIN — OXYCODONE HYDROCHLORIDE 10 MG: 10 TABLET ORAL at 22:47

## 2023-09-01 RX ADMIN — LIDOCAINE HYDROCHLORIDE 100 MG: 20 INJECTION, SOLUTION EPIDURAL; INFILTRATION; INTRACAUDAL; PERINEURAL at 07:34

## 2023-09-01 RX ADMIN — ACETAMINOPHEN 975 MG: 325 TABLET, FILM COATED ORAL at 23:01

## 2023-09-01 RX ADMIN — OXYCODONE HYDROCHLORIDE 10 MG: 10 TABLET ORAL at 17:54

## 2023-09-01 RX ADMIN — GABAPENTIN 300 MG: 300 CAPSULE ORAL at 17:54

## 2023-09-01 RX ADMIN — ROCURONIUM BROMIDE 10 MG: 10 INJECTION, SOLUTION INTRAVENOUS at 09:39

## 2023-09-01 RX ADMIN — ROCURONIUM BROMIDE 10 MG: 10 INJECTION, SOLUTION INTRAVENOUS at 12:29

## 2023-09-01 NOTE — QUICK NOTE
Post Op Check Note - Surgery Resident  William Wang 77 y.o. male MRN: [id number]  Unit/Bed#: [facility] 429-01 Encounter: [id number]    ASSESSMENT:  William Wang is a 77 y.o. male who is status post R lobectomy with robotics converted to open approach. Subjective: Patient doing well. Denies pain, nausea, or vomiting. No SOB. On 2L NC    Physical Exam:  GEN: NAD  CV: RRR  Lung: Normal effort  Ab: Soft, NT/ND  Neuro: A+Ox3  Incisions: CDI    Blood pressure 118/75, pulse 72, temperature 97.5 °F (36.4 °C), resp. rate 14, height 5' 9" (1.753 m), weight 81.6 kg (180 lb), SpO2 96 %. ,Body mass index is 26.58 kg/m².       Intake/Output Summary (Last 24 hours) at 9/1/2023 1606  Last data filed at 9/1/2023 1530  Gross per 24 hour   Intake 2600 ml   Output 585 ml   Net 2015 ml       Invasive Devices     Central Venous Catheter Line  Duration           Port A Cath 04/27/23 Right Subclavian 127 days          Peripheral Intravenous Line  Duration           Peripheral IV 09/01/23 Left Wrist <1 day    Peripheral IV 09/01/23 Right Wrist <1 day          Drain  Duration           Chest Tube 1 Right Pleural 28 Fr. <1 day    Urethral Catheter Latex;Straight-tip 16 Fr. <1 day                VTE Pharmacologic Prophylaxis: Enoxaparin (Lovenox)

## 2023-09-01 NOTE — OP NOTE
OPERATIVE REPORT  PATIENT NAME: Audrey Rudd    :  1955  MRN: [id number]  Pt Location: [facility] OR ROOM 14    SURGERY DATE: 2023    Surgeon(s) and Role:     * Sally Cadet MD - Primary     * REJI Meek-C - [address], DO - Assisting     * Mg Crawford MD - Assisting    Preop Diagnosis:  Carcinoma of right lung ([address]) [C34.91]    Post-Op Diagnosis Codes:     * Carcinoma of right lung ([address]) [C34.91]    Procedure(s):  1.  Robotic converted to right thoracotomy with right upper lobectomy  2. Extensive mediastinal lymph node dissection  3. Right T4 through right T7 intercostal nerve cryoablation  4. Right T3-T10 intercostal nerve block with Exparel  5. Bronchoscopy    Specimen(s):  ID Type Source Tests Collected by Time Destination   1 : Right Upper Lobe  Tissue Lung TISSUE EXAM Sally Cadet MD 2023 8566    2 : level 8 Tissue Lymph Node TISSUE EXAM Sally Cadet MD 2023 9759    3 : level 7 #1 Tissue Lymph Node TISSUE EXAM Sally Cadet MD 2023 0827    4 : level 4R Tissue Lymph Node TISSUE EXAM Sally Cadet MD 2023 8337    5 : level 4R #2 Tissue Lymph Node TISSUE EXAM Sally Cadet MD 2023 3764    6 : level 2R #1 Tissue Lymph Node TISSUE EXAM Sally Cadet MD 2023 0845    7 : level 2R #2 Tissue Lymph Node TISSUE Rogerio Alas MD 2023 0846    8 : level 11 posterior  Tissue Lymph Node TISSUE EXAM Sally Cadet MD 2023 1158    9 : portion of level 12 on artery  Tissue Lymph Node TISSUE Rogerio Alas MD 2023 1308        Estimated Blood Loss:   100 mL    Drains:  Chest Tube 1 Right Pleural 28 Fr. (Active)   Function To water seal 23 1515   Drainage Description Sanguineous 23 1515   Dressing Status Clean;Dry; Intact 23 1515   Site Assessment Unable to assess 23 1515   Surrounding Skin Dry; Intact 23 1515   Output (mL) 100 mL 23 1530   Number of days: 0       Urethral Catheter Latex;Straight-tip 16 Fr. (Active)   Site Assessment Clean;Skin intact 09/01/23 1515   Collection Container Standard drainage bag 09/01/23 1515   Securement Method Securing device (Describe) 09/01/23 1515   Output (mL) 150 mL 09/01/23 1530   Number of days: 0       Anesthesia Type:   General    Operative Indications:  Carcinoma of right lung ([address]) []  52-year-old male with history of T3 N1 M1 right upper lobe non-small cell lung cancer complicated by an oligo brain metastasis status post SRS and induction chemoradiation with significantly improved hilar lymphadenopathy    Operative Findings:  Profound inflammatory changes at the hilar lymph nodes requiring open meticulous dissection able to completely resected with grossly negative margins. Complications:   None    Procedure and Technique:  After obtaining informed consent from the patient, they were transferred to the operating room and placed supine on the operating table. Bilateral SCDs were placed and turned on prior to induction of general anesthesia. General anesthesia was then induced and a double-lumen endotracheal tube was placed without incident. Positioning of the double-lumen tube was verified with a pediatric bronchoscope. The patient was then positioned in a left lateral decubitus position with the table fully flexed. The left arm was secured to an armboard and the right arm was positioned safely in a arm castellon. Rolled blankets were used for support anterior and posterior and pillows were placed between her legs. All bony prominences were padded and checked. Positioning of double-lumen endotracheal tube was verified at this time. Next a formal time-out was called verifying patient , date of birth, procedure, consent, antibiotics, beta-blocker as indicated, anticipated specimens with handling, SCD use and other special considerations.       A bronchoscopy was performed through the double-lumen endotracheal tube. There were no endobronchial masses or obstructions identified. All secretions were suctioned out and positioning of the double lumen tube was again verified. There was no suspicion or identified risk for TB or other air board infectious disease. This bronchoscopy was being performed for diagnostic purposes only. The right chest was then prepped and draped in the standard sterile fashion. Anesthesia clamped and placed suction to the right lung at this time to help desufflate. Bony landmarks were identified and planned incisions were mapped out. An 8 mm incision was made in the posterior axillary line 7th interspace for the camera port. The camera was inserted verifying that we are in the thoracic cavity and insufflation was initiated to 8 mm of mercury. A thorough thoracoscopy was carried out at this time. There were no significant adhesions. Next an intercostal nerve block was performed using 60 mL of a mixture 1/2 percent Marcaine, normal saline and liposomal bupivacaine (Exparel). Rib spaces 3 through 10 were identified and using a percutaneous approach a block was placed into each of these ribs spaces with approximately 5mL of this mixture. The remainder of the robotic ports were placed at this time. An 8 mm incision was made as far posterior as possible in the 8th interspace in the posterior port was placed under direct visualization. An 12 mm incision was made retirement in between the camera and arm 1 port and this port was placed under direct visualization. Anteriorly a 12 mm incision was made as anterior and inferior as possible along the costal margin with the insertion point just above the diaphragm. The robotic stapling port was placed through this under direct visualization. Finally a 15 mm incision was made retirement between the camera and arm 4, as inferior as possible staying above the diaphragm. A 15 mm assistant port was placed through here.    The robot was docked at this time. A caudier forceps was inserted into arm 2, [place] bipolar grasper into arm 4 and tip up fenestrated grasper into arm 1. I un-scrubbed at this time and went to the console. We began by taking down the inferior pulmonary ligament with bipolar cautery. This dissection was continued towards the hilum to the level of the inferior pulmonary vein. We looked for any possible level 8 or level 9 lymph nodes. These nodes were removed and sent for permanent pathology. This dissection was continued up posteriorly towards the azygous vein. We dissected the right upper and lower lobes off of the right mainstem bronchus, bronchus intermedius and right upper lobe bronchus reflecting the lung anteriorly. Next we dissected out the subcarinal level 7 lymph node packet. This was removed completely and sent for permanent pathology. We continued to mobilize as much of the upper and lower lobe is possible from the posterior aspect. We cleared off the apical hilar attachments. The lung was then reflected downward exposing the level 4R and 2R tri space. We entered into this space with bipolar cautery. Two large tri packets were identified, removed and sent for permanent pathology. We then went to the fissure. The fissure was completely fused. I carefully dissected down onto the pulmonary artery and stapled portion of the pulmonary artery. There were very large lymph nodes here that appeared to be densely adherent to the ongoing pulmonary artery. Due to this we turned to the anterior hilum. We dissected out the superior pulmonary vein. We made certain that there was a separate branch going to the middle lobe. The superior pulmonary vein was stapled with multiple robotic 30 mm white load staple firings. We then attempted to dissect out the apical trunk. Again there is a very firm likely malignant lymph node on the inferior aspect of the apical trunk.   At this point I did not think it was safe to proceed in a minimally invasive fashion and the decision was made to convert to an open thoracotomy    Bony landmarks were identified and planned incisions were mapped out. We made a posterior lateral thoracotomy a 1 fingerbreadth below the scapular tip. The subcutaneous tissue and soft tissue was divided with electrocautery down to the latissimus. We divided the latissimus and half with electrocautery. We then raised flaps below the latissimus. We identified the serratus anterior posterior border and dissected this out. This was reflected anteriorly. This allowed us to get under the scapula. Using a scapular retractor we palpated the rib spaces and identified the 5th intercostal space. We entered the 5th intercostal space with electrocautery being sure to avoid the underlying lung. Once we were able to fully visualize this we dissected in the chest making sure we were free of any thoracic structures. We opened the intercostal space as far anterior and posterior as possible using electrocautery. We then used a rib retractor and ajay soft tissue retractor to help retract some soft tissue. At this point we performed our cryo nerve ablation with the AtriCure sphere probe. Intercostal spaces RT4 through R T7 were frozen to at least -60 degrees C for 2 minutes each. We made certain to stay off the lung during this time. We then began our meticulous dissection to complete our right upper lobectomy. We started from the anterior aspect. There was an aberrant anterior branch going to the right upper lobe. We dissected this out and stapled this with a 35 mm vascular staple firing. We then dissected out the apical trunk. There were dense tri tissue on here. We removed portions of this and sent this for permanent pathology. We decided to stapled the individual branches separately.   Once these were dissected free we were able to get around these and stapled them separately with vascular staple firings. We then completed the minor fissure with multiple blue and green load staple firings. We made certain to preserve all middle lobe structures. From the posterior aspect we dissected out lymph nodes on the pulmonary artery. These were sent for permanent pathology. There was a large posterior a sending branch. This was densely adherent to the bronchus. We were eventually able to get around this and stapled this with a 35 mm vascular stapler. The bronchus was all that remained at this time. We mobilized tri tissue was much as possible up with our specimen. We were unable to completely resect the large level 11 and 12 lymph nodes on here as they were densely adherent and likely on the main pulmonary artery. Once we had all tri tissue up with our specimen we clamped the airway with a mechanized 45 mm green load staple firing and stapled our bronchus. This completed our lobectomy specimen. We used pro gel on all staple lines to ensure no air leaks. Satisfied with hemostasis, we placed our chest tubes. A 28 [place] straight chest tube was placed through the inferior assistant port previously placed during the robotic case. The chest tube was sutured in place with 0 Prolene U sutures and another 0 Prolene tie stitch. We then began to close our thoracotomy site. The ribs were reapproximated with interrupted #1 Vicryl sutures to prevent lung herniation. The lung was then reinflated at this point. This came up nicely and the middle lobe inflated without any evidence of torsion. We closed the serratus layer with running 0 Vicryl suture. The latissimus was reapproximated with running 0 Vicryl suture. The soft tissue was closed with running 3-0 Vicryl suture and the skin was stapled. We closed all robotic port sites with 3-0 Vicryl and 4-0 Monocryl as well as glue. Dry sterile dressings were applied and the drapes were broken down.  All instrument and needle counts were correct at this time. The patient was extubated and transported to the PACU in stable condition. I was present for the entire procedure. and A qualified resident physician was not available. Dr. Daria Andrew scrubbed and served as my first assist during this procedure due to no qualified resident being available and the complexity of the procedure. Please add a modifier 22 to this procedure as it took over 6 hours due to the dense adhesions from induction chemo/immunotherapy.     Patient Disposition:  PACU         SIGNATURE: Annmarie Das MD  DATE: September 1, 2023  TIME: 6:09 PM

## 2023-09-01 NOTE — ANESTHESIA PREPROCEDURE EVALUATION
Procedure:  robotic assisted right upper lobectomy, lymph node dissection (Right: Chest)  possible right thoracotomy (Right: Chest)  BRONCHOSCOPY FLEXIBLE ([place])    Relevant Problems   CARDIO   (+) Essential hypertension   (+) Hypercholesterolemia      NEURO/PSYCH   (+) CVA (cerebral vascular accident) ([address]) (some residual numbness in LUE and LLE)      Echo 03/21/2023:  •  Left Ventricle: Left ventricular cavity size is normal. Wall thickness is mildly increased. There is mild concentric hypertrophy. The left ventricular ejection fraction is 75%. Systolic function is hyperdynamic. Wall motion is normal. Diastolic function is mildly abnormal, consistent with grade I (abnormal) relaxation. •  Right Ventricle: Right ventricular cavity size is normal. Systolic function is hyperdynamic. •  Left Atrium: The atrium is mildly dilated. •  Mitral Valve: There is mild annular calcification. •  Tricuspid Valve: There is mild regurgitation. Physical Exam    Airway    Mallampati score: I  TM Distance: >3 FB  Neck ROM: full     Dental   No notable dental hx     Cardiovascular      Pulmonary      Other Findings        Anesthesia Plan  ASA Score- 3     Anesthesia Type- general with ASA Monitors. Additional Monitors:   Airway Plan: ETT. Comment: YANICK. Plan Factors-Exercise tolerance (METS): >4 METS. Chart reviewed. Imaging results reviewed. Existing labs reviewed. Patient summary reviewed. Induction- intravenous. Postoperative Plan- Plan for postoperative opioid use. Informed Consent- Anesthetic plan and risks discussed with patient. I personally reviewed this patient with the CRNA. Discussed and agreed on the Anesthesia Plan with the CRNA. Salvador Pierson

## 2023-09-02 LAB
ABO GROUP BLD BPU: NORMAL
ANION GAP SERPL CALCULATED.3IONS-SCNC: 7 MMOL/L
BPU ID: NORMAL
BUN SERPL-MCNC: 16 MG/DL (ref 5–25)
CALCIUM SERPL-MCNC: 7.8 MG/DL (ref 8.4–10.2)
CHLORIDE SERPL-SCNC: 104 MMOL/L (ref 96–108)
CO2 SERPL-SCNC: 27 MMOL/L (ref 21–32)
CREAT SERPL-MCNC: 0.86 MG/DL (ref 0.6–1.3)
CROSSMATCH: NORMAL
CROSSMATCH: NORMAL
ERYTHROCYTE [DISTWIDTH] IN BLOOD BY AUTOMATED COUNT: 14.6 % (ref 11.6–15.1)
GFR SERPL CREATININE-BSD FRML MDRD: 87 ML/MIN/1.73SQ M
GLUCOSE SERPL-MCNC: 132 MG/DL (ref 65–140)
HCT VFR BLD AUTO: 36.8 % (ref 36.5–49.3)
HGB BLD-MCNC: 11.6 G/DL (ref 12–17)
MAGNESIUM SERPL-MCNC: 1.9 MG/DL (ref 1.9–2.7)
MCH RBC QN AUTO: 30.1 PG (ref 26.8–34.3)
MCHC RBC AUTO-ENTMCNC: 31.5 G/DL (ref 31.4–37.4)
MCV RBC AUTO: 95 FL (ref 82–98)
PHOSPHATE SERPL-MCNC: 4.1 MG/DL (ref 2.3–4.1)
PLATELET # BLD AUTO: 232 THOUSANDS/UL (ref 149–390)
PMV BLD AUTO: 8.9 FL (ref 8.9–12.7)
POTASSIUM SERPL-SCNC: 4.5 MMOL/L (ref 3.5–5.3)
RBC # BLD AUTO: 3.86 MILLION/UL (ref 3.88–5.62)
SODIUM SERPL-SCNC: 138 MMOL/L (ref 135–147)
UNIT DISPENSE STATUS: NORMAL
UNIT PRODUCT CODE: NORMAL
UNIT PRODUCT VOLUME: 280 ML
UNIT PRODUCT VOLUME: 280 ML
UNIT PRODUCT VOLUME: 350 ML
UNIT PRODUCT VOLUME: 350 ML
UNIT RH: NORMAL
WBC # BLD AUTO: 12.49 THOUSAND/UL (ref 4.31–10.16)

## 2023-09-02 PROCEDURE — 83735 ASSAY OF MAGNESIUM: CPT | Performed by: SURGERY

## 2023-09-02 PROCEDURE — 84100 ASSAY OF PHOSPHORUS: CPT | Performed by: SURGERY

## 2023-09-02 PROCEDURE — 85027 COMPLETE CBC AUTOMATED: CPT | Performed by: SURGERY

## 2023-09-02 PROCEDURE — 99024 POSTOP FOLLOW-UP VISIT: CPT | Performed by: THORACIC SURGERY (CARDIOTHORACIC VASCULAR SURGERY)

## 2023-09-02 PROCEDURE — 80048 BASIC METABOLIC PNL TOTAL CA: CPT | Performed by: SURGERY

## 2023-09-02 RX ORDER — DOCUSATE SODIUM 100 MG/1
100 CAPSULE, LIQUID FILLED ORAL DAILY
Status: DISCONTINUED | OUTPATIENT
Start: 2023-09-02 | End: 2023-09-05 | Stop reason: HOSPADM

## 2023-09-02 RX ORDER — GABAPENTIN 100 MG/1
100 CAPSULE ORAL 3 TIMES DAILY
Status: DISCONTINUED | OUTPATIENT
Start: 2023-09-02 | End: 2023-09-05 | Stop reason: HOSPADM

## 2023-09-02 RX ORDER — POLYETHYLENE GLYCOL 3350 17 G/17G
17 POWDER, FOR SOLUTION ORAL DAILY PRN
Status: DISCONTINUED | OUTPATIENT
Start: 2023-09-02 | End: 2023-09-03

## 2023-09-02 RX ORDER — SENNOSIDES 8.6 MG
1 TABLET ORAL
Status: DISCONTINUED | OUTPATIENT
Start: 2023-09-02 | End: 2023-09-03

## 2023-09-02 RX ADMIN — OXYCODONE HYDROCHLORIDE 10 MG: 10 TABLET ORAL at 17:52

## 2023-09-02 RX ADMIN — AMLODIPINE BESYLATE 10 MG: 10 TABLET ORAL at 08:38

## 2023-09-02 RX ADMIN — ACETAMINOPHEN 975 MG: 325 TABLET, FILM COATED ORAL at 05:14

## 2023-09-02 RX ADMIN — ENOXAPARIN SODIUM 40 MG: 40 INJECTION SUBCUTANEOUS at 08:39

## 2023-09-02 RX ADMIN — GABAPENTIN 100 MG: 100 CAPSULE ORAL at 17:15

## 2023-09-02 RX ADMIN — HYDROMORPHONE HYDROCHLORIDE 0.5 MG: 1 INJECTION, SOLUTION INTRAMUSCULAR; INTRAVENOUS; SUBCUTANEOUS at 17:13

## 2023-09-02 RX ADMIN — ACETAMINOPHEN 975 MG: 325 TABLET, FILM COATED ORAL at 11:51

## 2023-09-02 RX ADMIN — ATORVASTATIN CALCIUM 40 MG: 40 TABLET, FILM COATED ORAL at 17:18

## 2023-09-02 RX ADMIN — OXYCODONE HYDROCHLORIDE 10 MG: 10 TABLET ORAL at 08:38

## 2023-09-02 RX ADMIN — OXYCODONE HYDROCHLORIDE 10 MG: 10 TABLET ORAL at 13:42

## 2023-09-02 RX ADMIN — OXYCODONE HYDROCHLORIDE 10 MG: 10 TABLET ORAL at 22:03

## 2023-09-02 RX ADMIN — ACETAMINOPHEN 975 MG: 325 TABLET, FILM COATED ORAL at 17:15

## 2023-09-02 RX ADMIN — DOCUSATE SODIUM 100 MG: 100 CAPSULE, LIQUID FILLED ORAL at 11:51

## 2023-09-02 RX ADMIN — ACETAMINOPHEN 975 MG: 325 TABLET, FILM COATED ORAL at 23:05

## 2023-09-02 RX ADMIN — GABAPENTIN 100 MG: 100 CAPSULE ORAL at 20:49

## 2023-09-02 RX ADMIN — GABAPENTIN 300 MG: 300 CAPSULE ORAL at 08:38

## 2023-09-02 NOTE — RESTORATIVE TECHNICIAN NOTE
Restorative Technician Note      Patient Name: Audrey Rudd     Restorative Tech Visit Date: 09/02/23  Note Type: Mobility  Patient Position Upon Consult: Bedside chair  Activity Performed: Ambulated  Assistive Device: Roller walker  Patient Position at End of Consult: All needs within reach;  Bedside chair

## 2023-09-02 NOTE — RESTORATIVE TECHNICIAN NOTE
Restorative Technician Note      Patient Name: Jose Raul Lemos     Restorative Tech Visit Date: 09/02/23  Note Type: Mobility  Patient Position Upon Consult: Bedside chair  Activity Performed: Ambulated  Assistive Device: Roller walker  Patient Position at End of Consult: All needs within reach;  Bedside chair

## 2023-09-02 NOTE — UTILIZATION REVIEW
Initial Clinical Review    Elective Inpatient surgical procedure  Age/Sex: 77 y.o. male  Surgery Date: 9/1/23  Procedure:    1. Robotic converted to right thoracotomy with right upper lobectomy  2. Extensive mediastinal lymph node dissection  3. Right T4 through right T7 intercostal nerve cryoablation  4. Right T3-T10 intercostal nerve block with Exparel  5. Bronchoscopy  Anesthesia: general  Operative Findings: Profound inflammatory changes at the hilar lymph nodes requiring open meticulous dissection able to completely resected with grossly negative margins. POD#1 Progress Note:  No acute events overnight. Reports controlled level of pain. Endorses passing flatus, no bowel movements yet. Abdomen soft, mildly distended, nontender. Advance diet, CT to waterseal, DC [place], continue pain and nausea control prn, encourage inc spirometry, ambulation. Admission Orders: Date/Time/Statement:   Admission Orders (From admission, onward)     Ordered        09/01/23 0726  Inpatient Admission  Once                      Orders Placed This Encounter   Procedures   • Inpatient Admission     Standing Status:   Standing     Number of Occurrences:   1     Order Specific Question:   Level of Care     Answer:   Level 1 Stepdown [13]     Order Specific Question:   Bed request comments     Answer:   p4     Order Specific Question:   Estimated length of stay     Answer:   More than 2 Midnights     Order Specific Question:   Certification     Answer:   I certify that inpatient services are medically necessary for this patient for a duration of greater than two midnights. See H&P and MD Progress Notes for additional information about the patient's course of treatment.      Vital Signs: /63 (BP Location: Right arm)   Pulse 71   Temp 97.8 °F (36.6 °C) (Oral)   Resp 20   Ht 5' 9" (1.753 m)   Wt 87.3 kg (192 lb 7.4 oz) Comment: weighed 2x  SpO2 98%   BMI 28.42 kg/m²     Pertinent Labs/Diagnostic Test Results:   XR chest portable   Final Result by Morgan Morales DO (09/02 0531)      Small right apical pneumothorax with ipsilateral chest tube. Stable lobular fullness right mediastinal border, nonspecific. Left base subsegmental atelectasis. The study was marked in [facility] for immediate notification. Workstation performed: [id number]         XR chest portable   Final Result by Lexii Enriquez MD (09/01 1542)      Large right paracardiac and paramediastinal mass. Previously noted right upper lobe mass is no longer apparent. Left mid and lower lung opacity likely due to pneumonia. The study was marked in [facility] for immediate notification.                   Workstation performed: [id number]               Results from last 7 days   Lab Units 09/02/23  0514 09/01/23  1826 09/01/23 1214 09/01/23  1101   WBC Thousand/uL 12.49*  --   --   --    HEMOGLOBIN g/dL 11.6*  --   --   --    I STAT HEMOGLOBIN g/dl  --   --  10.5* 10.5*   HEMATOCRIT % 36.8  --   --   --    HEMATOCRIT, ISTAT %  --   --  31* 31*   PLATELETS Thousands/uL 232 237  --   --          Results from last 7 days   Lab Units 09/02/23  0514 09/01/23  1214 09/01/23  1101   SODIUM mmol/L 138  --   --    POTASSIUM mmol/L 4.5  --   --    CHLORIDE mmol/L 104  --   --    CO2 mmol/L 27  --   --    CO2, I-STAT mmol/L  --  27 28   ANION GAP mmol/L 7  --   --    BUN mg/dL 16  --   --    CREATININE mg/dL 0.86  --   --    EGFR ml/min/1.73sq m 87  --   --    CALCIUM mg/dL 7.8*  --   --    CALCIUM, IONIZED, ISTAT mmol/L  --  1.13 1.14   MAGNESIUM mg/dL 1.9  --   --    PHOSPHORUS mg/dL 4.1  --   --      Results from last 7 days   Lab Units 09/02/23  0514   GLUCOSE RANDOM mg/dL 132     Results from last 7 days   Lab Units 09/01/23  1214 09/01/23  1101   PH, JUSTUS I-STAT  7.262* 7.272*   PCO2, JUSTUS ISTAT mm HG 56.9* 57.7*   PO2, JUSTUS ISTAT mm HG 77.0* 79.0*   HCO3, JUSTUS ISTAT mmol/L 25.6 26.6   I STAT BASE EXC mmol/L -2 -1   I STAT O2 SAT % 93* 93*     Results from last 7 days   Lab Units 08/30/23  0904   PROTIME seconds 13.2   INR  0.98   PTT seconds 26     Results from last 7 days   Lab Units 09/02/23  0748 09/02/23  0718   UNIT PRODUCT CODE  [id number]  [id number]  [id number]   UNIT NUMBER  [id number]  [id number]  [id number]   UNITABO  O  O A  A   UNITRH  POS  POS POS  POS   CROSSMATCH  Compatible  Compatible  --    UNIT DISPENSE STATUS  Return to Inv  Return to Inv Return to Inv  Return to Inv   UNIT PRODUCT VOL mL 350  350 280  280     Diet: regular  Mobility: OOB and ambulation  DVT Prophylaxis: lovenox, scd, ambulatory    Medications/Pain Control:   Scheduled Medications:  acetaminophen, 975 mg, Oral, Q6H [address]  amLODIPine, 10 mg, Oral, Daily  atorvastatin, 40 mg, Oral, After Dinner  enoxaparin, 40 mg, Subcutaneous, Daily  gabapentin, 100 mg, Oral, TID      Continuous IV Infusions: none     PRN Meds:  HYDROmorphone, 0.5 mg, Intravenous, Q3H PRN  ondansetron, 4 mg, Intravenous, Q6H PRN  oxyCODONE, 10 mg, Oral, Q4H PRN  oxyCODONE, 5 mg, Oral, Q4H PRN        Network Utilization Review Department  ATTENTION: Please call with any questions or concerns to [phone] and carefully listen to the prompts so that you are directed to the right person. All voicemails are confidential.  [employer] all requests for admission clinical reviews, approved or denied determinations and any other requests to dedicated fax number below belonging to the campus where the patient is receiving treatment.  List of dedicated fax numbers for the Facilities:  [place] DENIALS (Administrative/Medical Necessity) [phone]   [address] (Maternity/NICU/Pediatrics) [phone]   [address] 1521 [facility] [address] [facility] [address] [address] [facility] [address] [facility] [address] [phone]

## 2023-09-02 NOTE — PROGRESS NOTES
Progress Note - Thoracic Surgery   Coleman Momin 77 y.o. male MRN: [id number]  Unit/Bed#: [facility] 429-01 Encounter: [id number]    Assessment:  78-year-old male with history of carcinoma of the right lung, status post 9/1 robotic converted to right thoracotomy right upper lobectomy. Subjective:  No acute events overnight. Reports controlled level of pain. Patient denies nausea, vomiting, fever, chills, chest pain, shortness of breath. Endorses passing flatus, no bowel movements yet. Objective:    Vitals:   Afebrile, Stable VS on 3 L nc  Temp:  [97.2 °F (36.2 °C)-98.7 °F (37.1 °C)] 97.8 °F (36.6 °C)  HR:  [69-84] 71  Resp:  [12-20] 20  BP: (113-135)/(58-77) 113/63  Body mass index is 28.42 kg/m². Physical Exam:   Gen: NAD, Resting in chair  Neuro: A&O, No focal deficits  Head: Normal Cephalic, Atraumatic  Eye: EOMI  CV: Regular rate  Pulm: Normal work of breathing, No respiratory distress on 3 L nc  Abd: Soft, Mildly Distended, Non-Tender  Ext: No edema bilateral lower extremities, Non-tender  Skin: Warm, Dry, Intact  Incision intact with serosanguinous drainage. R chest tube in place. I/O:  U.O: 1.3 L.   R CT -20; 450 ss - AL     Intake/Output Summary (Last 24 hours) at 9/2/2023 0826  Last data filed at 9/2/2023 0501  Gross per 24 hour   Intake 3024 ml   Output 1885 ml   Net 1139 ml       Lab Results:  09/02/23 labs pending  Recent Labs     09/01/23  1214 09/01/23  1826 09/02/23  0514   WBC  --   --  12.49*   HGB 10.5*  --  11.6*   PLT  --  237 232   SODIUM  --   --  138   K  --   --  4.5   CL  --   --  104   CO2 27  --  27   BUN  --   --  16   CREATININE  --   --  0.86   GLUC  --   --  132   CALCIUM  --   --  7.8*   MG  --   --  1.9       Plan:  - Regular diet  - CT to waterseal  - Dc melton  - Dressing changes  - Pain and nausea control PRN  - Encourage IS, ambulation   - DVT ppx    ---    David Webb MD  General Surgery PGY-I

## 2023-09-02 NOTE — PLAN OF CARE
Problem: MOBILITY - ADULT  Goal: Maintain or return to baseline ADL function  Description: INTERVENTIONS:  -  Assess patient's ability to carry out ADLs; assess patient's baseline for ADL function and identify physical deficits which impact ability to perform ADLs (bathing, care of mouth/teeth, toileting, grooming, dressing, etc.)  - Assess/evaluate cause of self-care deficits   - Assess range of motion  - Assess patient's mobility; develop plan if impaired  - Assess patient's need for assistive devices and provide as appropriate  - Encourage maximum independence but intervene and supervise when necessary  - Involve family in performance of ADLs  - Assess for home care needs following discharge   - Consider OT consult to assist with ADL evaluation and planning for discharge  - Provide patient education as appropriate  Outcome: Progressing  Goal: Maintains/Returns to pre admission functional level  Description: INTERVENTIONS:  - Perform BMAT or MOVE assessment daily.   - Set and communicate daily mobility goal to care team and patient/family/caregiver.    - Collaborate with rehabilitation services on mobility goals if consulted  Problem: PAIN - ADULT  Goal: Verbalizes/displays adequate comfort level or baseline comfort level  Description: Interventions:  - Encourage patient to monitor pain and request assistance  - Assess pain using appropriate pain scale  - Administer analgesics based on type and severity of pain and evaluate response  - Implement non-pharmacological measures as appropriate and evaluate response  - Consider cultural and social influences on pain and pain management  - Notify physician/advanced practitioner if interventions unsuccessful or patient reports new pain  Outcome: Progressing     Problem: INFECTION - ADULT  Goal: Absence or prevention of progression during hospitalization  Description: INTERVENTIONS:  - Assess and monitor for signs and symptoms of infection  - Monitor lab/diagnostic results  - Monitor all insertion sites, i.e. indwelling lines, tubes, and drains  - Monitor endotracheal if appropriate and nasal secretions for changes in amount and color  - [place] appropriate cooling/warming therapies per order  - Administer medications as ordered  - Instruct and encourage patient and family to use good hand hygiene technique  - Identify and instruct in appropriate isolation precautions for identified infection/condition  Outcome: Progressing  Goal: Absence of fever/infection during neutropenic period  Description: INTERVENTIONS:  - Monitor WBC    Outcome: Progressing     Problem: SAFETY ADULT  Goal: Maintain or return to baseline ADL function  Description: INTERVENTIONS:  -  Assess patient's ability to carry out ADLs; assess patient's baseline for ADL function and identify physical deficits which impact ability to perform ADLs (bathing, care of mouth/teeth, toileting, grooming, dressing, etc.)  - Assess/evaluate cause of self-care deficits   - Assess range of motion  - Assess patient's mobility; develop plan if impaired  - Assess patient's need for assistive devices and provide as appropriate  - Encourage maximum independence but intervene and supervise when necessary  - Involve family in performance of ADLs  - Assess for home care needs following discharge   - Consider OT consult to assist with ADL evaluation and planning for discharge  - Provide patient education as appropriate  Outcome: Progressing  Goal: Maintains/Returns to pre admission functional level  Description: INTERVENTIONS:  - Perform BMAT or MOVE assessment daily.   - Set and communicate daily mobility goal to care team and patient/family/caregiver.    - Collaborate with rehabilitation services on mobility goals if consulted  - Out of bed for toileting  - Record patient progress and toleration of activity level   Outcome: Progressing  Goal: Patient will remain free of falls  Description: INTERVENTIONS:  - Educate patient/family on patient safety including physical limitations  - Instruct patient to call for assistance with activity   - Consult OT/PT to assist with strengthening/mobility   - Keep Call bell within reach  - Keep bed low and locked with side rails adjusted as appropriate  - Keep care items and personal belongings within reach  - Initiate and maintain comfort rounds  - Make Fall Risk Sign visible to staff  - Apply yellow socks and bracelet for high fall risk patients  - Consider moving patient to room near nurses station  Outcome: Progressing     - Out of bed for toileting  - Record patient progress and toleration of activity level   Outcome: Progressing

## 2023-09-03 PROCEDURE — 99024 POSTOP FOLLOW-UP VISIT: CPT | Performed by: THORACIC SURGERY (CARDIOTHORACIC VASCULAR SURGERY)

## 2023-09-03 RX ORDER — SENNOSIDES 8.6 MG
1 TABLET ORAL
Status: DISCONTINUED | OUTPATIENT
Start: 2023-09-03 | End: 2023-09-05 | Stop reason: HOSPADM

## 2023-09-03 RX ORDER — BISACODYL 10 MG
10 SUPPOSITORY, RECTAL RECTAL DAILY PRN
Status: DISCONTINUED | OUTPATIENT
Start: 2023-09-03 | End: 2023-09-05 | Stop reason: HOSPADM

## 2023-09-03 RX ORDER — POLYETHYLENE GLYCOL 3350 17 G/17G
17 POWDER, FOR SOLUTION ORAL DAILY
Status: DISCONTINUED | OUTPATIENT
Start: 2023-09-03 | End: 2023-09-05 | Stop reason: HOSPADM

## 2023-09-03 RX ORDER — METHOCARBAMOL 500 MG/1
500 TABLET, FILM COATED ORAL EVERY 8 HOURS SCHEDULED
Status: DISCONTINUED | OUTPATIENT
Start: 2023-09-03 | End: 2023-09-05 | Stop reason: HOSPADM

## 2023-09-03 RX ADMIN — ENOXAPARIN SODIUM 40 MG: 40 INJECTION SUBCUTANEOUS at 09:53

## 2023-09-03 RX ADMIN — AMLODIPINE BESYLATE 10 MG: 10 TABLET ORAL at 09:53

## 2023-09-03 RX ADMIN — HYDROMORPHONE HYDROCHLORIDE 0.5 MG: 1 INJECTION, SOLUTION INTRAMUSCULAR; INTRAVENOUS; SUBCUTANEOUS at 23:58

## 2023-09-03 RX ADMIN — POLYETHYLENE GLYCOL 3350 17 G: 17 POWDER, FOR SOLUTION ORAL at 09:54

## 2023-09-03 RX ADMIN — ACETAMINOPHEN 975 MG: 325 TABLET, FILM COATED ORAL at 05:51

## 2023-09-03 RX ADMIN — OXYCODONE HYDROCHLORIDE 10 MG: 10 TABLET ORAL at 07:11

## 2023-09-03 RX ADMIN — OXYCODONE HYDROCHLORIDE 10 MG: 10 TABLET ORAL at 21:33

## 2023-09-03 RX ADMIN — OXYCODONE HYDROCHLORIDE 10 MG: 10 TABLET ORAL at 12:00

## 2023-09-03 RX ADMIN — DOCUSATE SODIUM 100 MG: 100 CAPSULE, LIQUID FILLED ORAL at 09:53

## 2023-09-03 RX ADMIN — SENNOSIDES 8.6 MG: 8.6 TABLET, FILM COATED ORAL at 20:54

## 2023-09-03 RX ADMIN — METHOCARBAMOL 500 MG: 500 TABLET ORAL at 13:31

## 2023-09-03 RX ADMIN — ATORVASTATIN CALCIUM 40 MG: 40 TABLET, FILM COATED ORAL at 18:07

## 2023-09-03 RX ADMIN — GABAPENTIN 100 MG: 100 CAPSULE ORAL at 09:53

## 2023-09-03 RX ADMIN — GABAPENTIN 100 MG: 100 CAPSULE ORAL at 20:55

## 2023-09-03 RX ADMIN — ACETAMINOPHEN 975 MG: 325 TABLET, FILM COATED ORAL at 18:07

## 2023-09-03 RX ADMIN — ACETAMINOPHEN 975 MG: 325 TABLET, FILM COATED ORAL at 12:00

## 2023-09-03 RX ADMIN — GABAPENTIN 100 MG: 100 CAPSULE ORAL at 16:43

## 2023-09-03 RX ADMIN — OXYCODONE HYDROCHLORIDE 10 MG: 10 TABLET ORAL at 16:43

## 2023-09-03 RX ADMIN — METHOCARBAMOL 500 MG: 500 TABLET ORAL at 20:55

## 2023-09-03 RX ADMIN — OXYCODONE HYDROCHLORIDE 10 MG: 10 TABLET ORAL at 02:52

## 2023-09-03 RX ADMIN — ACETAMINOPHEN 975 MG: 325 TABLET, FILM COATED ORAL at 23:57

## 2023-09-03 NOTE — PLAN OF CARE
Problem: MOBILITY - ADULT  Goal: Maintain or return to baseline ADL function  Description: INTERVENTIONS:  -  Assess patient's ability to carry out ADLs; assess patient's baseline for ADL function and identify physical deficits which impact ability to perform ADLs (bathing, care of mouth/teeth, toileting, grooming, dressing, etc.)  - Assess/evaluate cause of self-care deficits   - Assess range of motion  - Assess patient's mobility; develop plan if impaired  - Assess patient's need for assistive devices and provide as appropriate  - Encourage maximum independence but intervene and supervise when necessary  - Involve family in performance of ADLs  - Assess for home care needs following discharge   - Consider OT consult to assist with ADL evaluation and planning for discharge  - Provide patient education as appropriate  Outcome: Progressing  Goal: Maintains/Returns to pre admission functional level  Description: INTERVENTIONS:  - Perform BMAT or MOVE assessment daily.   - Set and communicate daily mobility goal to care team and patient/family/caregiver.    - Collaborate with rehabilitation services on mobility goals if consulted  Problem: PAIN - ADULT  Goal: Verbalizes/displays adequate comfort level or baseline comfort level  Description: Interventions:  - Encourage patient to monitor pain and request assistance  - Assess pain using appropriate pain scale  - Administer analgesics based on type and severity of pain and evaluate response  - Implement non-pharmacological measures as appropriate and evaluate response  - Consider cultural and social influences on pain and pain management  - Notify physician/advanced practitioner if interventions unsuccessful or patient reports new pain  Outcome: Progressing     Problem: INFECTION - ADULT  Goal: Absence or prevention of progression during hospitalization  Description: INTERVENTIONS:  - Assess and monitor for signs and symptoms of infection  - Monitor lab/diagnostic results  - Monitor all insertion sites, i.e. indwelling lines, tubes, and drains  - Monitor endotracheal if appropriate and nasal secretions for changes in amount and color  - [place] appropriate cooling/warming therapies per order  - Administer medications as ordered  - Instruct and encourage patient and family to use good hand hygiene technique  - Identify and instruct in appropriate isolation precautions for identified infection/condition  Outcome: Progressing  Goal: Absence of fever/infection during neutropenic period  Description: INTERVENTIONS:  - Monitor WBC    Outcome: Progressing     Problem: SAFETY ADULT  Goal: Maintain or return to baseline ADL function  Description: INTERVENTIONS:  -  Assess patient's ability to carry out ADLs; assess patient's baseline for ADL function and identify physical deficits which impact ability to perform ADLs (bathing, care of mouth/teeth, toileting, grooming, dressing, etc.)  - Assess/evaluate cause of self-care deficits   - Assess range of motion  - Assess patient's mobility; develop plan if impaired  - Assess patient's need for assistive devices and provide as appropriate  - Encourage maximum independence but intervene and supervise when necessary  - Involve family in performance of ADLs  - Assess for home care needs following discharge   - Consider OT consult to assist with ADL evaluation and planning for discharge  - Provide patient education as appropriate  Outcome: Progressing  Goal: Maintains/Returns to pre admission functional level  Description: INTERVENTIONS:  - Perform BMAT or MOVE assessment daily.   - Set and communicate daily mobility goal to care team and patient/family/caregiver.    - Collaborate with rehabilitation services on mobility goals if consulted  - Out of bed for toileting  - Record patient progress and toleration of activity level   Outcome: Progressing  Goal: Patient will remain free of falls  Description: INTERVENTIONS:  - Educate patient/family on patient safety including physical limitations  - Instruct patient to call for assistance with activity   - Consult OT/PT to assist with strengthening/mobility   - Keep Call bell within reach  - Keep bed low and locked with side rails adjusted as appropriate  - Keep care items and personal belongings within reach  - Initiate and maintain comfort rounds  - Make Fall Risk Sign visible to staff  - Apply yellow socks and bracelet for high fall risk patients  - Consider moving patient to room near nurses station  Outcome: Progressing     - Out of bed for toileting  - Record patient progress and toleration of activity level   Outcome: Progressing

## 2023-09-03 NOTE — PROGRESS NOTES
Progress Note - Thoracic Surgery   Blue De Jesus 77 y.o. male MRN: [id number]  Unit/Bed#: [facility] 429-01 Encounter: [id number]    Assessment:  49-year-old male with history of carcinoma of the right lung, status post 9/1 robotic converted to right thoracotomy right upper lobectomy. AVSS on RA    UOP: 1600cc  BM: none charted  R CT -8,-AL: 450cc ss    Lab Results   Component Value Date/Time    WBC 12.49 (H) 09/02/2023 05:14 AM    WBC 7.07 07/19/2023 02:09 PM    WBC 7.1 05/10/2018 01:39 PM    HGB 11.6 (L) 09/02/2023 05:14 AM    HGB 10.5 (L) 09/01/2023 12:14 PM    HGB 10.5 (L) 09/01/2023 11:01 AM    HGB 10.8 (L) 07/19/2023 02:09 PM    HGB 14.6 05/10/2018 01:39 PM    CREATININE 0.86 09/02/2023 05:14 AM    CREATININE 0.82 07/19/2023 02:09 PM    CREATININE 0.80 05/10/2018 01:39 PM          Subjective:  No acute events overnight. Pt had some increased CT insertion site pain/cramping but has been otherwise well controlled. Passing flatus, no BM. Voiding. Objective:    Vitals: Temp:  [97.8 °F (36.6 °C)-98.8 °F (37.1 °C)] 97.8 °F (36.6 °C)  HR:  [77-92] 78  Resp:  [17] 17  BP: (117-156)/(74-90) 120/75  Body mass index is 28.65 kg/m². Physical Exam:   Gen: NAD, Resting in chair  Neuro: A&O, No focal deficits  Head: Normal Cephalic, Atraumatic  Eye: EOMI  CV: Regular rate  Pulm: Normal work of breathing, on 2L NC, CT in place.    Abd: Soft, Mildly Distended, Non-Tender  Ext: No edema bilateral lower extremities, Non-tender  Skin: Warm, Dry, Intact        Intake/Output Summary (Last 24 hours) at 9/3/2023 0753  Last data filed at 9/3/2023 0747  Gross per 24 hour   Intake 1620 ml   Output 2050 ml   Net -430 ml       Lab Results:  09/03/23 labs pending  Recent Labs     09/01/23  1214 09/01/23  1826 09/02/23  0514   WBC  --   --  12.49*   HGB 10.5*  --  11.6*   PLT  --  237 232   SODIUM  --   --  138   K  --   --  4.5   CL  --   --  104   CO2 27  --  27   BUN  --   --  16   CREATININE  --   --  0.86   GLUC  --   --  132 CALCIUM  --   --  7.8*   MG  --   --  1.9       Plan:  - Regular diet  - Continue CT to waterseal  -supplemental O2 at night, pt likely requires CPAP not diagnoised  -wean o2 during day- titrate spO2 >90%  - Pain and nausea control PRN  - Encourage IS, ambulation   - DVT ppx

## 2023-09-03 NOTE — RESTORATIVE TECHNICIAN NOTE
Restorative Technician Note      Patient Name: Nidia Pearl     Restorative Tech Visit Date: 09/03/23  Note Type: Mobility  Patient Position Upon Consult: Bedside chair  Activity Performed: Ambulated  Assistive Device: Roller walker  Patient Position at End of Consult: All needs within reach;  Bedside chair

## 2023-09-03 NOTE — DISCHARGE INSTR - AVS FIRST PAGE
Gently wash your incisions daily with soap and water, do not soak in a tub. Do not apply any lotions, creams, or ointments to incisions. No lifting over 10 lbs or strenuous exercise. No driving until seen at your post operative visit. The blue stitch will be removed at your post operative visit. Please obtain a pa/lat chest xray at a [facility] within 3 days of your follow up visit. Please call the office first if you have any questions or concerns during your post op period, prior to going to the emergency room or urgent care. You will be prescribed 2 medications to take at home, that should be taken exactly as directed. These have been shown to greatly improve post-operative pain:     Gabapentin 100mg tablet. Take 1 tablet by mouth 3x a day for 3 weeks. 2.   Tylenol 325mg tablet. Take 2 tablets by mouth, every 6 hours, for 1 week. You will also be prescribed a medication that you may take on an as needed basis:  Oxycodone 5mg tablet. Take 1-2 tablets every 4 hours as needed for pain.

## 2023-09-03 NOTE — RESTORATIVE TECHNICIAN NOTE
Restorative Technician Note      Patient Name: Clint Arce     Restorative Tech Visit Date: 09/03/23  Note Type: Mobility  Patient Position Upon Consult: Bedside chair  Activity Performed: Ambulated  Assistive Device: Roller walker  Patient Position at End of Consult: All needs within reach;  Bedside chair

## 2023-09-04 ENCOUNTER — APPOINTMENT (OUTPATIENT)
Dept: RADIOLOGY | Facility: HOSPITAL | Age: 68
DRG: 164 | End: 2023-09-04
Payer: COMMERCIAL

## 2023-09-04 LAB
ANION GAP SERPL CALCULATED.3IONS-SCNC: 7 MMOL/L
BASOPHILS # BLD AUTO: 0.07 THOUSANDS/ÂΜL (ref 0–0.1)
BASOPHILS NFR BLD AUTO: 1 % (ref 0–1)
BUN SERPL-MCNC: 13 MG/DL (ref 5–25)
CALCIUM SERPL-MCNC: 8.6 MG/DL (ref 8.4–10.2)
CHLORIDE SERPL-SCNC: 103 MMOL/L (ref 96–108)
CO2 SERPL-SCNC: 28 MMOL/L (ref 21–32)
CREAT SERPL-MCNC: 0.79 MG/DL (ref 0.6–1.3)
EOSINOPHIL # BLD AUTO: 0.26 THOUSAND/ÂΜL (ref 0–0.61)
EOSINOPHIL NFR BLD AUTO: 3 % (ref 0–6)
ERYTHROCYTE [DISTWIDTH] IN BLOOD BY AUTOMATED COUNT: 14.5 % (ref 11.6–15.1)
GFR SERPL CREATININE-BSD FRML MDRD: 90 ML/MIN/1.73SQ M
GLUCOSE SERPL-MCNC: 125 MG/DL (ref 65–140)
HCT VFR BLD AUTO: 36.5 % (ref 36.5–49.3)
HGB BLD-MCNC: 11.6 G/DL (ref 12–17)
IMM GRANULOCYTES # BLD AUTO: 0.09 THOUSAND/UL (ref 0–0.2)
IMM GRANULOCYTES NFR BLD AUTO: 1 % (ref 0–2)
LYMPHOCYTES # BLD AUTO: 1.41 THOUSANDS/ÂΜL (ref 0.6–4.47)
LYMPHOCYTES NFR BLD AUTO: 17 % (ref 14–44)
MAGNESIUM SERPL-MCNC: 1.9 MG/DL (ref 1.9–2.7)
MCH RBC QN AUTO: 30.2 PG (ref 26.8–34.3)
MCHC RBC AUTO-ENTMCNC: 31.8 G/DL (ref 31.4–37.4)
MCV RBC AUTO: 95 FL (ref 82–98)
MONOCYTES # BLD AUTO: 0.65 THOUSAND/ÂΜL (ref 0.17–1.22)
MONOCYTES NFR BLD AUTO: 8 % (ref 4–12)
NEUTROPHILS # BLD AUTO: 5.76 THOUSANDS/ÂΜL (ref 1.85–7.62)
NEUTS SEG NFR BLD AUTO: 70 % (ref 43–75)
NRBC BLD AUTO-RTO: 0 /100 WBCS
PLATELET # BLD AUTO: 232 THOUSANDS/UL (ref 149–390)
PMV BLD AUTO: 9 FL (ref 8.9–12.7)
POTASSIUM SERPL-SCNC: 4.1 MMOL/L (ref 3.5–5.3)
RBC # BLD AUTO: 3.84 MILLION/UL (ref 3.88–5.62)
SODIUM SERPL-SCNC: 138 MMOL/L (ref 135–147)
WBC # BLD AUTO: 8.24 THOUSAND/UL (ref 4.31–10.16)

## 2023-09-04 PROCEDURE — 83735 ASSAY OF MAGNESIUM: CPT | Performed by: STUDENT IN AN ORGANIZED HEALTH CARE EDUCATION/TRAINING PROGRAM

## 2023-09-04 PROCEDURE — 71046 X-RAY EXAM CHEST 2 VIEWS: CPT

## 2023-09-04 PROCEDURE — 97166 OT EVAL MOD COMPLEX 45 MIN: CPT

## 2023-09-04 PROCEDURE — 85025 COMPLETE CBC W/AUTO DIFF WBC: CPT | Performed by: STUDENT IN AN ORGANIZED HEALTH CARE EDUCATION/TRAINING PROGRAM

## 2023-09-04 PROCEDURE — 80048 BASIC METABOLIC PNL TOTAL CA: CPT | Performed by: STUDENT IN AN ORGANIZED HEALTH CARE EDUCATION/TRAINING PROGRAM

## 2023-09-04 PROCEDURE — 97163 PT EVAL HIGH COMPLEX 45 MIN: CPT

## 2023-09-04 PROCEDURE — 99024 POSTOP FOLLOW-UP VISIT: CPT | Performed by: THORACIC SURGERY (CARDIOTHORACIC VASCULAR SURGERY)

## 2023-09-04 RX ORDER — MAGNESIUM SULFATE HEPTAHYDRATE 40 MG/ML
2 INJECTION, SOLUTION INTRAVENOUS ONCE
Status: COMPLETED | OUTPATIENT
Start: 2023-09-04 | End: 2023-09-04

## 2023-09-04 RX ADMIN — OXYCODONE HYDROCHLORIDE 10 MG: 10 TABLET ORAL at 11:05

## 2023-09-04 RX ADMIN — AMLODIPINE BESYLATE 10 MG: 10 TABLET ORAL at 08:57

## 2023-09-04 RX ADMIN — DOCUSATE SODIUM 100 MG: 100 CAPSULE, LIQUID FILLED ORAL at 08:57

## 2023-09-04 RX ADMIN — GABAPENTIN 100 MG: 100 CAPSULE ORAL at 08:57

## 2023-09-04 RX ADMIN — BISACODYL 10 MG: 10 SUPPOSITORY RECTAL at 09:48

## 2023-09-04 RX ADMIN — SENNOSIDES 8.6 MG: 8.6 TABLET, FILM COATED ORAL at 21:10

## 2023-09-04 RX ADMIN — POLYETHYLENE GLYCOL 3350 17 G: 17 POWDER, FOR SOLUTION ORAL at 08:56

## 2023-09-04 RX ADMIN — OXYCODONE HYDROCHLORIDE 10 MG: 10 TABLET ORAL at 17:04

## 2023-09-04 RX ADMIN — METHOCARBAMOL 500 MG: 500 TABLET ORAL at 13:57

## 2023-09-04 RX ADMIN — METHOCARBAMOL 500 MG: 500 TABLET ORAL at 06:33

## 2023-09-04 RX ADMIN — GABAPENTIN 100 MG: 100 CAPSULE ORAL at 21:10

## 2023-09-04 RX ADMIN — MAGNESIUM SULFATE HEPTAHYDRATE 2 G: 40 INJECTION, SOLUTION INTRAVENOUS at 15:04

## 2023-09-04 RX ADMIN — ACETAMINOPHEN 975 MG: 325 TABLET, FILM COATED ORAL at 17:03

## 2023-09-04 RX ADMIN — ENOXAPARIN SODIUM 40 MG: 40 INJECTION SUBCUTANEOUS at 08:57

## 2023-09-04 RX ADMIN — METHOCARBAMOL 500 MG: 500 TABLET ORAL at 21:10

## 2023-09-04 RX ADMIN — OXYCODONE HYDROCHLORIDE 10 MG: 10 TABLET ORAL at 04:20

## 2023-09-04 RX ADMIN — ACETAMINOPHEN 975 MG: 325 TABLET, FILM COATED ORAL at 11:06

## 2023-09-04 RX ADMIN — OXYCODONE HYDROCHLORIDE 10 MG: 10 TABLET ORAL at 21:10

## 2023-09-04 RX ADMIN — ACETAMINOPHEN 975 MG: 325 TABLET, FILM COATED ORAL at 06:33

## 2023-09-04 RX ADMIN — ATORVASTATIN CALCIUM 40 MG: 40 TABLET, FILM COATED ORAL at 17:04

## 2023-09-04 RX ADMIN — GABAPENTIN 100 MG: 100 CAPSULE ORAL at 15:06

## 2023-09-04 NOTE — PLAN OF CARE
Problem: PHYSICAL THERAPY ADULT  Goal: Performs mobility at highest level of function for planned discharge setting. See evaluation for individualized goals. Description: Treatment/Interventions: Functional transfer training, LE strengthening/ROM, Elevations, Therapeutic exercise, Endurance training, Equipment eval/education, Bed mobility, Gait training, Spoke to nursing, OT  Equipment Recommended: Galina Tolbert (at this time)       See flowsheet documentation for full assessment, interventions and recommendations. Note: Prognosis: Good  Problem List: Decreased strength, Decreased endurance, Impaired balance, Decreased mobility, Obesity  Assessment: Pt is 77 y.o. male admitted with Dx of Lung nodule and underwent Robotic converted to right thoracotomy with right upper lobectomy, Extensive mediastinal lymph node dissection, Right T4 through right T7 intercostal nerve cryoablation, Right T3-T10 intercostal nerve block with Exparel and Bronchoscopy on 9/1/2023. Pt 's comorbidities affecting POC include: Hypertension, Prostate cancer and Stroke and personal factors of: ZACH and steps in the house. Pt's clinical presentation is currently  unstable/unpredictable which is evident in ongoing telem monitoring, CT in place, and abn lab values. Pt presents w/ postop guarding, min overall weakness, decreased functional endurance and inconsistent amb balance and gait patterns requiring use of rw at this time. Will cont to follow pt in PT for progressive mobilization to max level of (I), endurance, and safety. Otherwise, anticipate pt will return home w/ available family support upon D/C provided he cont improving w/ mobility skills, safety, and endurance (incl on the steps) and when medically cleared; home PT follow up may need to be considered. Will follow        PT Discharge Recommendation: Home with home health rehabilitation    See flowsheet documentation for full assessment.

## 2023-09-04 NOTE — OCCUPATIONAL THERAPY NOTE
Occupational Therapy Evaluation      Gretel Comer    9/4/2023    Principal Problem:    Lung nodule      Past Medical History:   Diagnosis Date    Cancer ([address]) 2001    Receint lung and brain lesions and prostate cancer in 2001    Hypertension     Prostate cancer ([address]) 2001    Rectal bleeding     Stroke [facility])     2011         Past Surgical History:   Procedure Laterality Date    COLONOSCOPY      CRANIOTOMY Right 3/23/2023    Procedure: Right frontal CRANIOTOMY IMAGE-GUIDED FOR TUMOR;  Surgeon: Olivia Martinez MD;  Location: [facility];  Service: Neurosurgery    IR PORT PLACEMENT  4/27/2023    TX CYSTOURETHROSCOPY N/A 3/23/2023    Procedure: EUA, DEL CASTILLO INSERTION;  Surgeon: Socorro Silva MD;  Location: [facility];  Service: Urology    PROSTATECTOMY  2001    TONSILLECTOMY          09/04/23 0926   OT Last Visit   OT Visit Date 09/04/23   Note Type   Note type Evaluation   Pain Assessment   Pain Assessment Tool 0-10   Pain Score 3   Pain Location/Orientation Orientation: Right;Location: Chest;Location: Rib Cage   Home Living   Type of [address] [place] [address] Two level;Bed/bath upstairs;1/2 bath on main level   Bathroom Shower/Tub Tub/shower unit   Bathroom Toilet Standard   Home Equipment Cane   Prior Function   Level of [place] Independent with ADLs; Independent with functional mobility   Lives With Spouse   IADLs Independent with driving; Independent with meal prep; Independent with medication management   Vocational Full time employment   Comments 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 he reports having had a brain surgery a few months back, but that he has recovered nicely.    Reciprocal Relationships supportive wife   Intrinsic Gratification doing his arts, walk and bicycling   Subjective   Subjective " i also like to go on my bicycle"   ADL   Eating Assistance 7  Independent   Grooming Assistance 7  Independent   UB Bathing Assistance 5  Supervision/Setup   LB Bathing Assistance 5  Supervision/Setup   UB Dressing Assistance 5  Supervision/Setup   LB Dressing Assistance 5  Supervision/Setup   Toileting Assistance  5  Supervision/Setup   Transfers   Sit to Stand 6  Modified independent   Stand to Sit 6  Modified independent   Stand pivot 6  Modified independent   Functional Mobility   Functional Mobility 6  Modified independent   Additional items Rolling walker   Balance   Static Sitting Good   Dynamic Sitting Good   Static Standing Fair +   Dynamic Standing Fair +   Activity Tolerance   Activity Tolerance Patient tolerated treatment well;Patient limited by fatigue   Medical Staff Made Aware seen for co-eval with PT Jc due to medical complexity   Nurse Made Aware ok to see   RUE Assessment   RUE Assessment WFL   LUE Assessment   LUE Assessment WFL   Hand Function   Gross Motor Coordination Functional   Fine Motor Coordination Functional   Vision - Complex Assessment   Tracking Intact   Psychosocial   Psychosocial (WDL) WDL   Perception   Inattention/Neglect Appears intact   Cognition   Overall Cognitive Status WFL   Arousal/Participation Alert; Cooperative   Attention Within functional limits   Orientation Level Oriented X4   Memory Within functional limits   Following Commands Follows all commands and directions without difficulty   Assessment   Assessment Pt is a 77 y.o. male seen for OT evaluation s/p admit to [address] [place] [address] on 9/1/2023 w/ Lung nodule. He is now s/p  R lobectomy with robotics converted to open approach. he has a chest tube. pt  has a past medical history of Cancer ([address]) (2001), Hypertension, Prostate cancer ([address]) (2001), Rectal bleeding, and Stroke ([address] brain mass, compression. Personal factors affecting pt at time of IE include:steps to enter environment.  Prior to admission, pt was living w his wife in a 2 , being independent w self care and mobility. Upon evaluation: Pt requires no phyisical assist for self care. He was able to walk using RW, no loss of balance noted. Pt with SOB during min activity. He was educated on ECT/self pacing skills as well as breathing techniques w good understanding. Based on findings from OT evaluation and functional performance deficits, pt has been identified as a moderate complexity evaluation. The patient's raw score on the AM-PAC Daily Activity inpatient short form is 24, standardized score is 57.54, greater than 39.4. Patients at this level are likely to benefit from discharge to home. From OT standpoint, recommendation at time of d/c would be home w no ongoing skilled OT needs. DC OT at this time.    Goals   Patient Goals to go home   Plan   OT Frequency Eval only   Recommendation   OT Discharge Recommendation No rehabilitation needs   AM-PAC Daily Activity Inpatient   Lower Body Dressing 4   Bathing 4   Toileting 4   Upper Body Dressing 4   Grooming 4   Eating 4   Daily Activity Raw Score 24   Daily Activity Standardized Score (Calc for Raw Score >=11) 57.54

## 2023-09-04 NOTE — QUICK NOTE
The suture securing the chest tube was cut and a knot was started in the [address] [place] [address]. I pulled the U-stitch taught and removed the chest tube in one motion while the patient hummed. The U-stitch knot was tied down securely. The patient tolerated the procedure well. There was no bleeding or significant drainage.

## 2023-09-04 NOTE — PROGRESS NOTES
Progress Note - Thoracic Surgery   Edmund Osei 77 y.o. male MRN: [id number]  Unit/Bed#: [facility] 429-01 Encounter: [id number]    Assessment:  18-year-old male with stage IV T3 N1 M1 right upper lobe non-small cell lung cancer status post stereotactic brain radiation and induction chemo and immunotherapy now status post 9/1/2023 robotic converted to right thoracotomy with right upper lobectomy    AVSS on RA    UOP: 2350cc  BM: none charted  R CT -8,-AL: 250cc ss    Subjective:  No acute events overnight. Pt reports pain well controlled. Passing flatus, no BM. Voiding. Denies SOB, fever, or chills. Objective:    Vitals: Temp:  [97.8 °F (36.6 °C)-98.8 °F (37.1 °C)] 98.8 °F (37.1 °C)  HR:  [78-89] 80  Resp:  [18-19] 18  BP: (120-162)/(75-82) 134/80  Body mass index is 28.65 kg/m². Tmax 98.6  Hr 70s-90s  -160/80s  On RA    Physical Exam:   Gen: NAD, Resting in chair  Neuro: A&O, No focal deficits  Head: Normal Cephalic, Atraumatic  Eye: EOMI  CV: Regular rate  Pulm: Normal work of breathing  Chest: CT in place. No airleak. [place]. Abd: Soft, Mildly Distended, Non-Tender  Ext: No edema bilateral lower extremities, Non-tender  Skin: Warm, Dry, Intact        Intake/Output Summary (Last 24 hours) at 9/4/2023 0646  Last data filed at 9/4/2023 0601  Gross per 24 hour   Intake 480 ml   Output 2600 ml   Net -2120 ml       Lab Results:  09/04/23   Recent Labs     09/02/23  0514 09/04/23  0423   WBC 12.49* 8.24   HGB 11.6* 11.6*    232   SODIUM 138 138   K 4.5 4.1    103   CO2 27 28   BUN 16 13   CREATININE 0.86 0.79   GLUC 132 125   CALCIUM 7.8* 8.6   MG 1.9 1.9       Plan:  - Regular diet  - Continue CT to waterseal  - supplemental O2 at night, pt likely requires CPAP not diagnoised  - wean o2 during day- titrate spO2 >90%  - Continue bowel reg of senna, miralax, and colace.    - Pain and nausea control PRN  - Encourage IS, ambulation   - DVT ppx

## 2023-09-04 NOTE — PLAN OF CARE
Problem: MOBILITY - ADULT  Goal: Maintain or return to baseline ADL function  Description: INTERVENTIONS:  -  Assess patient's ability to carry out ADLs; assess patient's baseline for ADL function and identify physical deficits which impact ability to perform ADLs (bathing, care of mouth/teeth, toileting, grooming, dressing, etc.)  - Assess/evaluate cause of self-care deficits   - Assess range of motion  - Assess patient's mobility; develop plan if impaired  - Assess patient's need for assistive devices and provide as appropriate  - Encourage maximum independence but intervene and supervise when necessary  - Involve family in performance of ADLs  - Assess for home care needs following discharge   - Consider OT consult to assist with ADL evaluation and planning for discharge  - Provide patient education as appropriate  Outcome: Progressing  Goal: Maintains/Returns to pre admission functional level  Description: INTERVENTIONS:  - Perform BMAT or MOVE assessment daily.   - Set and communicate daily mobility goal to care team and patient/family/caregiver. - Collaborate with rehabilitation services on mobility goals if consulted  - Perform Range of Motion  times a day. - Reposition patient every  hours.   - Dangle patient  times a day  - Stand patient  times a day  - Ambulate patient  times a day  - Out of bed to chair  times a day   - Out of bed for meals  times a day  - Out of bed for toileting  - Record patient progress and toleration of activity level   Outcome: Progressing     Problem: PAIN - ADULT  Goal: Verbalizes/displays adequate comfort level or baseline comfort level  Description: Interventions:  - Encourage patient to monitor pain and request assistance  - Assess pain using appropriate pain scale  - Administer analgesics based on type and severity of pain and evaluate response  - Implement non-pharmacological measures as appropriate and evaluate response  - Consider cultural and social influences on pain and pain management  - Notify physician/advanced practitioner if interventions unsuccessful or patient reports new pain  Outcome: Progressing     Problem: INFECTION - ADULT  Goal: Absence or prevention of progression during hospitalization  Description: INTERVENTIONS:  - Assess and monitor for signs and symptoms of infection  - Monitor lab/diagnostic results  - Monitor all insertion sites, i.e. indwelling lines, tubes, and drains  - Monitor endotracheal if appropriate and nasal secretions for changes in amount and color  - [place] appropriate cooling/warming therapies per order  - Administer medications as ordered  - Instruct and encourage patient and family to use good hand hygiene technique  - Identify and instruct in appropriate isolation precautions for identified infection/condition  Outcome: Progressing  Goal: Absence of fever/infection during neutropenic period  Description: INTERVENTIONS:  - Monitor WBC    Outcome: Progressing     Problem: SAFETY ADULT  Goal: Maintain or return to baseline ADL function  Description: INTERVENTIONS:  -  Assess patient's ability to carry out ADLs; assess patient's baseline for ADL function and identify physical deficits which impact ability to perform ADLs (bathing, care of mouth/teeth, toileting, grooming, dressing, etc.)  - Assess/evaluate cause of self-care deficits   - Assess range of motion  - Assess patient's mobility; develop plan if impaired  - Assess patient's need for assistive devices and provide as appropriate  - Encourage maximum independence but intervene and supervise when necessary  - Involve family in performance of ADLs  - Assess for home care needs following discharge   - Consider OT consult to assist with ADL evaluation and planning for discharge  - Provide patient education as appropriate  Outcome: Progressing  Goal: Maintains/Returns to pre admission functional level  Description: INTERVENTIONS:  - Perform BMAT or MOVE assessment daily.   - Set and communicate daily mobility goal to care team and patient/family/caregiver. - Collaborate with rehabilitation services on mobility goals if consulted  - Perform Range of Motion  times a day. - Reposition patient every  hours.   - Dangle patient  times a day  - Stand patient  times a day  - Ambulate patient  times a day  - Out of bed to chair  times a day   - Out of bed for meals  times a day  - Out of bed for toileting  - Record patient progress and toleration of activity level   Outcome: Progressing  Goal: Patient will remain free of falls  Description: INTERVENTIONS:  - Educate patient/family on patient safety including physical limitations  - Instruct patient to call for assistance with activity   - Consult OT/PT to assist with strengthening/mobility   - Keep Call bell within reach  - Keep bed low and locked with side rails adjusted as appropriate  - Keep care items and personal belongings within reach  - Initiate and maintain comfort rounds  - Make Fall Risk Sign visible to staff  - Offer Toileting every  Hours, in advance of need  - Initiate/Maintain alarm  - Obtain necessary fall risk management equipment:   - Apply yellow socks and bracelet for high fall risk patients  - Consider moving patient to room near nurses station  Outcome: Progressing     Problem: DISCHARGE PLANNING  Goal: Discharge to home or other facility with appropriate resources  Description: INTERVENTIONS:  - Identify barriers to discharge w/patient and caregiver  - Arrange for needed discharge resources and transportation as appropriate  - Identify discharge learning needs (meds, wound care, etc.)  - Arrange for interpretive services to assist at discharge as needed  - Refer to Case Management Department for coordinating discharge planning if the patient needs post-hospital services based on physician/advanced practitioner order or complex needs related to functional status, cognitive ability, or social support system  Outcome: Progressing Problem: Knowledge Deficit  Goal: Patient/family/caregiver demonstrates understanding of disease process, treatment plan, medications, and discharge instructions  Description: Complete learning assessment and assess knowledge base.   Interventions:  - Provide teaching at level of understanding  - Provide teaching via preferred learning methods  Outcome: Progressing     Problem: RESPIRATORY - ADULT  Goal: Achieves optimal ventilation and oxygenation  Description: INTERVENTIONS:  - Assess for changes in respiratory status  - Assess for changes in mentation and behavior  - Position to facilitate oxygenation and minimize respiratory effort  - Oxygen administered by appropriate delivery if ordered  - Initiate smoking cessation education as indicated  - Encourage broncho-pulmonary hygiene including cough, deep breathe, Incentive Spirometry  - Assess the need for suctioning and aspirate as needed  - Assess and instruct to report SOB or any respiratory difficulty  - Respiratory Therapy support as indicated  Outcome: Progressing

## 2023-09-04 NOTE — PHYSICAL THERAPY NOTE
Physical Therapy Evaluation     Patient's Name: Khalif Pina    Admitting Diagnosis  Carcinoma of right lung [facility]) [C34.91]    Problem List  Patient Active Problem List   Diagnosis    Negative depression screening    Overweight (BMI 25.0-29. 9)    Essential hypertension    Annual physical exam    CVA (cerebral vascular accident) ([address])    Hypercholesterolemia    Brain mass    Brain compression (HCC)    Cerebral edema (HCC)    Lung nodule    Metastatic adenocarcinoma ([address])    Carcinoma of right lung ([address])    Encounter for central line care    Chemotherapy-induced neutropenia ([address])    History of CVA (cerebrovascular accident)       Past Medical History  Past Medical History:   Diagnosis Date    Cancer ([address]) 2001    Receint lung and brain lesions and prostate cancer in 2001    Hypertension     Prostate cancer ([address]) 2001    Rectal bleeding     Stroke [facility])     2011         Past Surgical History  Past Surgical History:   Procedure Laterality Date    COLONOSCOPY      CRANIOTOMY Right 3/23/2023    Procedure: Right frontal CRANIOTOMY IMAGE-GUIDED FOR TUMOR;  Surgeon: Christine Larsen MD;  Location: [facility];  Service: Neurosurgery    IR PORT PLACEMENT  4/27/2023    CO CYSTOURETHROSCOPY N/A 3/23/2023    Procedure: Severianoy Samara;  Surgeon: Orestes Dacosta MD;  Location: [facility];  Service: Urology    PROSTATECTOMY  2001    TONSILLECTOMY          09/04/23 0917   PT Last Visit   PT Visit Date 09/04/23   Note Type   Note type Evaluation   Pain Assessment   Pain Assessment Tool FLACC   Pain Location/Orientation Orientation: Right;Location: Chest;Location: Incision   Pain Onset/Description Onset: Ongoing;Frequency: Intermittent; Descriptor: Aching;Descriptor: Discomfort   Effect of Pain on Daily Activities guarding   Patient's Stated Pain Goal No pain   Hospital Pain Intervention(s) Repositioned; Ambulation/increased activity; Emotional support   Pain Rating: FLACC (Rest) - Face 0   Pain Rating: FLACC (Rest) - Legs 0   Pain Rating: FLACC (Rest) - Activity 0   Pain Rating: FLACC (Rest) - Cry 0   Pain Rating: FLACC (Rest) - Consolability 0   Score: FLACC (Rest) 0   Pain Rating: FLACC (Activity) - Face 1   Pain Rating: FLACC (Activity) - Legs 0   Pain Rating: FLACC (Activity) - Activity 0   Pain Rating: FLACC (Activity) - Cry 1   Pain Rating: FLACC (Activity) - Consolability 0   Score: FLACC (Activity) 2   Restrictions/Precautions   Braces or Orthoses   (denies)   Other Precautions Multiple lines;Telemetry  ((R) CT)   Home Living   Type of Home House   Home Layout Two level  (4-5 ZACH w/ hand rail)   Home Equipment Cane   Prior Function   Level of [place] Independent with functional mobility  (amb w/o AD)   Lives With Spouse   General   Additional Pertinent History cleared for assessment by rin   Cognition   Overall Cognitive Status WFL   Arousal/Participation Alert   Orientation Level Oriented to person;Oriented to place;Oriented to situation   Memory Within functional limits   Following Commands Follows one step commands without difficulty   Subjective   Subjective Alert; in the chair; agreeable to mobilize   RUE Assessment   RUE Assessment WFL  (AROM)   LUE Assessment   LUE Assessment WFL  (AROM)   RLE Assessment   RLE Assessment WFL  (AROM)   Strength RLE   RLE Overall Strength   (fair +/ good -)   LLE Assessment   LLE Assessment WFL  (AROM)   Strength LLE   LLE Overall Strength   (fair +/ good -)   Transfers   Sit to Stand 5  Supervision   Stand to Sit 5  Supervision   Ambulation/Elevation   Gait pattern Excessively slow; Short stride   Gait Assistance 5  Supervision   Additional items Verbal cues   Assistive Device Rolling walker   Distance 100 ft and 140 ft w/ standing rest period in between   Balance   Static Sitting Good   Dynamic Sitting Fair +   Static Standing Fair   Dynamic Standing Fair -   Ambulatory Fair -   Activity Tolerance   Activity Tolerance Patient limited by fatigue   Nurse Made Aware spoke to [place] Assessment   Prognosis Good   Problem List Decreased strength;Decreased endurance; Impaired balance;Decreased mobility;Obesity   Assessment Pt is 77 y.o. male admitted with Dx of Lung nodule and underwent Robotic converted to right thoracotomy with right upper lobectomy, Extensive mediastinal lymph node dissection, Right T4 through right T7 intercostal nerve cryoablation, Right T3-T10 intercostal nerve block with Exparel and Bronchoscopy on 9/1/2023. Pt 's comorbidities affecting POC include: Hypertension, Prostate cancer and Stroke and personal factors of: ZACH and steps in the house. Pt's clinical presentation is currently  unstable/unpredictable which is evident in ongoing telem monitoring, CT in place, and abn lab values. Pt presents w/ postop guarding, min overall weakness, decreased functional endurance and inconsistent amb balance and gait patterns requiring use of rw at this time. Will cont to follow pt in PT for progressive mobilization to max level of (I), endurance, and safety. Otherwise, anticipate pt will return home w/ available family support upon D/C provided he cont improving w/ mobility skills, safety, and endurance (incl on the steps) and when medically cleared; home PT follow up may need to be considered. Will follow   Goals   Patient Goals to return home   STG Expiration Date 09/14/23   Short Term Goal #1 7-10 days. Pt will amb 300 ft w/ least restrictive assistive device PRN, mod (I) in order to facilitate safe return to premorbid environment and community amb status. Pt will negotiate 5 ---> 12 steps w/ hand rail and SPC PRN, mod (I) in order to navigate in and out of the house and between levels of home environment safely. Pt will achieve (I) level w/ bed mob in order to facilitate safety with OOB and back to bed transitions in own living environment. Pt will perform transfers w/ mod (I) to assure (I) and safety w/ functional mobility/transitions w/ all aspects of mobility/locomotion.   Pt will participate in LE therex and balance activities to max progression w/ mobility skills. PT Treatment Day 0   Plan   Treatment/Interventions Functional transfer training;LE strengthening/ROM; Elevations; Therapeutic exercise; Endurance training;Equipment eval/education; Bed mobility;Gait training;Spoke to nursing;OT   PT Frequency Other (Comment)  (3-6x/wk)   Recommendation   PT Discharge Recommendation Home with home health rehabilitation   Equipment Recommended Walker  (at this time)   [address] walker   AM-PAC Basic Mobility Inpatient   Turning in Flat Bed Without Bedrails 3   Lying on Back to Sitting on Edge of Flat Bed Without Bedrails 3   Moving Bed to Chair 3   Standing Up From Chair Using Arms 3   Walk in Room 3   Climb 3-5 Stairs With Railing 2   Basic Mobility Inpatient Raw Score 17   Basic Mobility Standardized Score 39.67   Highest Level Of Mobility   JH-HLM Goal 5: Stand one or more mins   JH-HLM Achieved 8: Walk 250 feet ot more   Modified [place] Scale   Modified [place] Scale 3   End of Consult   Patient Position at End of Consult Bedside chair; All needs within reach           [facility], PT

## 2023-09-05 ENCOUNTER — TRANSITIONAL CARE MANAGEMENT (OUTPATIENT)
Dept: FAMILY MEDICINE CLINIC | Facility: CLINIC | Age: 68
End: 2023-09-05

## 2023-09-05 ENCOUNTER — HOME HEALTH ADMISSION (OUTPATIENT)
Dept: HOME HEALTH SERVICES | Facility: HOME HEALTHCARE | Age: 68
End: 2023-09-05
Payer: COMMERCIAL

## 2023-09-05 VITALS
WEIGHT: 194 LBS | HEART RATE: 75 BPM | OXYGEN SATURATION: 94 % | SYSTOLIC BLOOD PRESSURE: 139 MMHG | RESPIRATION RATE: 20 BRPM | TEMPERATURE: 97.8 F | BODY MASS INDEX: 28.73 KG/M2 | HEIGHT: 69 IN | DIASTOLIC BLOOD PRESSURE: 85 MMHG

## 2023-09-05 PROCEDURE — 99024 POSTOP FOLLOW-UP VISIT: CPT | Performed by: THORACIC SURGERY (CARDIOTHORACIC VASCULAR SURGERY)

## 2023-09-05 RX ORDER — DOCUSATE SODIUM 100 MG/1
100 CAPSULE, LIQUID FILLED ORAL DAILY
Qty: 20 CAPSULE | Refills: 0 | Status: SHIPPED | OUTPATIENT
Start: 2023-09-06

## 2023-09-05 RX ORDER — ACETAMINOPHEN 325 MG/1
650 TABLET ORAL EVERY 6 HOURS
Qty: 56 TABLET | Refills: 0 | Status: SHIPPED | OUTPATIENT
Start: 2023-09-05 | End: 2023-09-12

## 2023-09-05 RX ORDER — FUROSEMIDE 10 MG/ML
20 INJECTION INTRAMUSCULAR; INTRAVENOUS ONCE
Status: COMPLETED | OUTPATIENT
Start: 2023-09-05 | End: 2023-09-05

## 2023-09-05 RX ORDER — OXYCODONE HYDROCHLORIDE 5 MG/1
5 TABLET ORAL EVERY 4 HOURS PRN
Qty: 20 TABLET | Refills: 0 | Status: SHIPPED | OUTPATIENT
Start: 2023-09-05 | End: 2023-09-08 | Stop reason: SDUPTHER

## 2023-09-05 RX ORDER — GABAPENTIN 100 MG/1
100 CAPSULE ORAL 3 TIMES DAILY
Qty: 63 CAPSULE | Refills: 0 | Status: SHIPPED | OUTPATIENT
Start: 2023-09-05 | End: 2023-09-07

## 2023-09-05 RX ADMIN — FUROSEMIDE 20 MG: 10 INJECTION, SOLUTION INTRAMUSCULAR; INTRAVENOUS at 12:39

## 2023-09-05 RX ADMIN — OXYCODONE HYDROCHLORIDE 10 MG: 10 TABLET ORAL at 05:06

## 2023-09-05 RX ADMIN — ACETAMINOPHEN 975 MG: 325 TABLET, FILM COATED ORAL at 12:37

## 2023-09-05 RX ADMIN — METHOCARBAMOL 500 MG: 500 TABLET ORAL at 05:06

## 2023-09-05 RX ADMIN — OXYCODONE HYDROCHLORIDE 10 MG: 10 TABLET ORAL at 10:32

## 2023-09-05 RX ADMIN — ACETAMINOPHEN 975 MG: 325 TABLET, FILM COATED ORAL at 02:13

## 2023-09-05 RX ADMIN — GABAPENTIN 100 MG: 100 CAPSULE ORAL at 08:07

## 2023-09-05 RX ADMIN — ENOXAPARIN SODIUM 40 MG: 40 INJECTION SUBCUTANEOUS at 08:07

## 2023-09-05 RX ADMIN — DOCUSATE SODIUM 100 MG: 100 CAPSULE, LIQUID FILLED ORAL at 08:07

## 2023-09-05 RX ADMIN — ACETAMINOPHEN 975 MG: 325 TABLET, FILM COATED ORAL at 05:06

## 2023-09-05 RX ADMIN — AMLODIPINE BESYLATE 10 MG: 10 TABLET ORAL at 08:07

## 2023-09-05 NOTE — DISCHARGE SUMMARY
Discharge Summary - Thoracic Surgery   [facility] 77 y.o. male MRN: [id number]  Unit/Bed#: [facility] 429-01 Encounter: [id number]    Admission Date:   Admission Orders (From admission, onward)     Ordered        09/01/23 0726  Inpatient Admission  Once                         Discharge Date: 9/5/23    Admitting Diagnosis: Carcinoma of right lung [facility]) [C34.91]    Discharge Diagnosis: stage IV RUL non-small cell lung cancer now s/p right thoracotomy with RUL lobectomy    Medical Problems     Resolved Problems  Date Reviewed: 9/3/2023   None         Attending: Dr. Vipul Rowland, thoracic surgery    Consulting Physician(s): None    Procedures Performed: No orders of the defined types were placed in this encounter. Pathology: Tissue samples from OR in process    Hospital Course: You were admitted following an elective right thoracotomy with RUL lobectomy for resection of known lung cancer. You did well after surgery and did not have shortness of breath. You were tolerating a diet well. Over the weekend, you had a bit of abdominal distension due to constipation. On 9/4 you were able to have a bowel movement. Also on 9/4 your chest tube was removed. On 9/5 you were deemed safe for discharge from the hospital.     Condition at Discharge: fair     Discharge instructions/Information to patient and family:   See after visit summary for information provided to patient and family. Provisions for Follow-Up Care:  See after visit summary for information related to follow-up care and any pertinent home health orders. Disposition: Home      Planned Readmission: No    Discharge Statement   I spent 15 minutes discharging the patient. This time was spent on the day of discharge. I had direct contact with the patient on the day of discharge. Additional documentation is required if more than 30 minutes were spent on discharge.      Discharge Medications:  See after visit summary for reconciled discharge medications provided to patient and family.

## 2023-09-05 NOTE — RESTORATIVE TECHNICIAN NOTE
Restorative Technician Note      Patient Name: Sekou Zambrano     Restorative Tech Visit Date: 09/05/23  Note Type: Mobility  Patient Position Upon Consult: Supine  Activity Performed: Ambulated  Assistive Device: Roller walker  Patient Position at End of Consult: All needs within reach;  Bedside chair

## 2023-09-05 NOTE — PROGRESS NOTES
Thoracic Surgery  Progress Note   Sekou Zambrano 77 y.o. male MRN: [id number]  Unit/Bed#: [facility] 429-01 Encounter: [id number]    Assessment:  77y.o.-year-old male with stage IV RUL non-small cell lung cancer now POD 4 s/p right thoracotomy with RUL lobectomy    Vital signs stable, afebrile. 92% on RA. UOP: 200      Plan:  • Diet: Continue Regular diet  • Continue Multimodal pain control  • Nausea control PRN  • Encourage OOB/IS and ambulation  • Continue CPAP at night  • Discharge today      Subjective/Objective     Subjective:   Patient seen and examined at bedside, in no acute distress. NAEON, pain is well controlled, denies N/V, CP, difficulty breathing. IS 1300 this AM. BM +, Flatus +    Objective:   Vitals:Blood pressure 135/83, pulse 82, temperature 98.5 °F (36.9 °C), resp. rate 20, height 5' 9" (1.753 m), weight 88 kg (194 lb 0.1 oz), SpO2 91 %. Temp (24hrs), Av.1 °F (36.7 °C), Min:97.7 °F (36.5 °C), Max:98.5 °F (36.9 °C)        Intake/Output Summary (Last 24 hours) at 2023 0520  Last data filed at 2023 1753  Gross per 24 hour   Intake 410 ml   Output 550 ml   Net -140 ml       Invasive Devices     Central Venous Catheter Line  Duration           Port A Cath 23 Right Subclavian 130 days          Peripheral Intravenous Line  Duration           Peripheral IV 23 Right Wrist 3 days                Physical Exam:  Physical Exam  Constitutional:       Appearance: Normal appearance. HENT:      Head: Normocephalic and atraumatic. Nose: Nose normal.   Eyes:      Pupils: Pupils are equal, round, and reactive to light. Cardiovascular:      Rate and Rhythm: Normal rate and regular rhythm. Pulses: Normal pulses. Pulmonary:      Effort: Pulmonary effort is normal.      Breath sounds: Normal breath sounds. Comments: R Thoracotomy incision site C/D/I with staples. Abdominal:      General: Abdomen is flat. Palpations: Abdomen is soft. Tenderness:  There is no abdominal tenderness. Musculoskeletal:      Right lower leg: No edema. Left lower leg: No edema. Neurological:      General: No focal deficit present. Mental Status: He is alert and oriented to person, place, and time.    Psychiatric:         Mood and Affect: Mood normal.         Behavior: Behavior normal.         Lab Results: Results: I have personally reviewed all pertinent laboratory/tests results    Ally Wu  9/5/2023

## 2023-09-05 NOTE — CASE MANAGEMENT
Case Management Assessment & Discharge Planning Note    Patient name Oneyda Castro  Location [address] [place] [address] [address]/[facility] 463-97 MRN [id number]  : 1955 Date 2023       Current Admission Date: 2023  Current Admission Diagnosis:Lung nodule   Patient Active Problem List    Diagnosis Date Noted   • History of CVA (cerebrovascular accident) 2023   • Chemotherapy-induced neutropenia ([address]) 2023   • Encounter for central line care 2023   • Carcinoma of right lung ([address]) 2023   • Lung nodule 2023   • Brain mass 2023   • Brain compression ([address]) 2023   • Cerebral edema ([address]) 2023   • Metastatic adenocarcinoma ([address])    • Hypercholesterolemia 2021   • CVA (cerebral vascular accident) ([address]) 2021   • Essential hypertension 2020   • Annual physical exam 2020   • Negative depression screening 2020   • Overweight (BMI 25.0-29.9) 2020      LOS (days): 4  Geometric Mean LOS (GMLOS) (days): 3.90  Days to GMLOS:-0.2     OBJECTIVE:    Risk of Unplanned Readmission Score: 17.67         Current admission status: Inpatient       Preferred Pharmacy:   [address] [address]  Phone: [phone] Fax: [phone]    Primary Care Provider: Ryan Davenport DO    Primary Insurance: BLUE CROSS  Secondary Insurance: MEDICARE    ASSESSMENT:  [facility] [address] [place] [address] Representative - Spouse   Primary Phone: [phone] (Mobile)                         Readmission Root Cause  30 Day Readmission: No    Patient Information  Admitted from[de-identified] Home  Mental Status: Alert  During Assessment patient was accompanied by: Spouse  Assessment information provided by[de-identified] Patient  Primary Caregiver: Self  Support Systems: Spouse/significant other  [place] of Residence: [facility] do you live in?: [address] [facility] entry access options. Select all that apply. Tay Block Stairs  Number of steps to enter home.: 4  Do the steps have railings?: Yes  Type of Current Residence: 2 story home  Upon entering residence, is there a bedroom on the main floor (no further steps)?: No  A bedroom is located on the following floor levels of residence (select all that apply):: 2nd Floor  Upon entering residence, is there a bathroom on the main floor (no further steps)?: Yes  Number of steps to 2nd floor from main floor: One Flight  In the last 12 months, was there a time when you were not able to pay the mortgage or rent on time?: No  In the last 12 months, how many places have you lived?: 1  In the last 12 months, was there a time when you did not have a steady place to sleep or slept in a shelter (including now)?: No  Homeless/housing insecurity resource given?: N/A  Living Arrangements: Lives w/ Spouse/significant other  Is patient a ?: No    Activities of Daily Living Prior to Admission  Functional Status: Independent  Completes ADLs independently?: Yes  Ambulates independently?: Yes  Does patient use assisted devices?: No  Does patient currently own DME?: Yes  What DME does the patient currently own?: Straight Cane  Does patient have a history of Outpatient Therapy (PT/OT)?: Yes  Does the patient have a history of Short-Term Rehab?: No  Does patient have a history of HHC?: No  Does patient currently have [address]?: No         Patient Information Continued  Income Source: Pension/[facility]  Does patient have prescription coverage?: Yes  Within the past 12 months, you worried that your food would run out before you got the money to buy more.: Never true  Within the past 12 months, the food you bought just didn't last and you didn't have money to get more.: Never true  Food insecurity resource given?: N/A  Does patient receive dialysis treatments?: No  Does patient have a history of substance abuse?: No  Does patient have a history of Mental Health Diagnosis?: No         Means of Transportation  Means of Transport to [facility][de-identified] Drives Self  In the past 12 months, has lack of transportation kept you from medical appointments or from getting medications?: No  In the past 12 months, has lack of transportation kept you from meetings, work, or from getting things needed for daily living?: No  Was application for public transport provided?: N/A        DISCHARGE DETAILS:    Discharge planning discussed with[de-identified] pt and pt spouse at bedside  Freedom of Choice: Yes  Comments - Freedom of Choice: CM discussed PT discharge recommendation with pt and pt spouse at bedside. Pt is agreeable. Pt stated his first choice is [facility]. [facility] referral submitted and reserved for PT via [facility]. Pt stated he will be borrowing a walker.   CM contacted family/caregiver?: Yes  Were Treatment Team discharge recommendations reviewed with patient/caregiver?: Yes  Did patient/caregiver verbalize understanding of patient care needs?: Yes  Were patient/caregiver advised of the risks associated with not following Treatment Team discharge recommendations?: Yes    Contacts  Patient Contacts: Bossman Pastor  Relationship to Patient[de-identified] Family  Contact Method: Phone  Phone Number: [phone]  Reason/Outcome: Continuity of Care, Emergency Contact, Discharge [address]         Is the patient interested in [address] at discharge?: Yes  [address] [facility] requested[de-identified] Physical [address] [facility] Name[de-identified] [facility] External Referral Reason (only applicable if external A name selected): Patient has established relationship with provider  [address] Provider[de-identified] PCP  Home Health Services Needed[de-identified] Strengthening/Theraputic Exercises to Improve Function, Evaluate Functional Status and Safety, Gait/ADL Training  Homebound Criteria Met[de-identified] Requires the Assistance of Another Person for Safe Ambulation or to Leave the Home  Supporting Clincal Findings[de-identified] Limited Endurance, Fatigues Kory Mcintosh in [employer]    DME Referral Provided  Referral made for DME?: No (Pt declined DME at this time)         Would you like to participate in our [address] service program?  : No - Declined    Treatment Team Recommendation: Home with [address]  Discharge Destination Plan[de-identified] Home with [address] at Discharge : Family                             IMM Given (Date):: 09/05/23  IMM Given to[de-identified] Patient     Additional Comments: CM introduced herself and role to pt and pt spouse at bedside. Pt stated he is independent at baseline. Pt stated he lives in a two story home with his spouse. Pt denied any MH or Substance abuse history at this time. Pt stated his wif Bossman is his POA. Pt stated once he is medically cleared and ready for discharge his wife will be able to provide transport. CM discussed PT discharge recommendation with pt and pt spouse at bedside. Pt is agreeable. Pt stated his first choice is [facility]. [facility] referral submitted and reserved for PT via [facility]. Pt stated he will be borrowing a walker. CM will continue to follow as needed.

## 2023-09-06 NOTE — UTILIZATION REVIEW
NOTIFICATION OF ADMISSION DISCHARGE   This is a Notification of Discharge from [facility]. Please be advised that this patient has been discharge from our facility. Below you will find the admission and discharge date and time including the patient’s disposition. UTILIZATION REVIEW CONTACT:  Ynes Hernandez  Utilization   Network Utilization Review Department  Phone: [phone] x carefully listen to the prompts. All voicemails are confidential.  Email: [email or web address]     ADMISSION INFORMATION  PRESENTATION DATE: 9/1/2023  5:23 AM  OBERVATION ADMISSION DATE:   INPATIENT ADMISSION DATE: 9/1/23  7:26 AM   DISCHARGE DATE: 9/5/2023  1:46 PM   DISPOSITION:Home with Home Health Care    IMPORTANT INFORMATION:  Send all requests for admission clinical reviews, approved or denied determinations and any other requests to dedicated fax number below belonging to the campus where the patient is receiving treatment.  List of dedicated fax numbers:  [place] DENIALS (Administrative/Medical Necessity) [phone] 230[address] [facility] (Maternity/NICU/Pediatrics) [phone]   [facility] [phone]   [facility] [phone] 16[address] [facility] [phone]   [address] [place] [address] [phone]   [facility] [phone]   [address] [facility] [phone]   [address] [place] [address] [phone] 3[address] [facility] [phone] 27[address] [facility] [address] [facility] [phone]

## 2023-09-06 NOTE — UTILIZATION REVIEW
Continued Stay Review    Date: 9/2-9/5/23                    Patient Class: Inpatient  Current Level of Care: Med Surg    HPI:68 y.o. male initially admitted on 9/1/23 for elective inpatient surgical procedure. 9/2 (POD #1 clinical included in initial review)    9/3 Thoracic Surgery:  78-year-old male with history of carcinoma of the right lung, status post 9/1 robotic converted to right thoracotomy right upper lobectomy.  Patient feeling pain slightly more. Right-sided chest tube in place to waterseal.  No airleak currently. Output 450mL in last 24 hours serous thin fluid. Plan:  Keep chest tube in place to waterseal given output. Will assess for removal 9/4. Encourage ambulation and incentive spirometry. Aggressive pain control regimen. Did intercostal nerve block and cryoablation in the OR. Standing Tylenol and gabapentin. As needed oxycodone. No nausea or vomiting but increased abdominal distention this morning. Passing gas but no bowel movements. We will do aggressive bowel regimen with senna Colace and MiraLAX. Possible suppository versus enema later. PT/ OT. PE: AO. Normal work of breathing, on 2L NC.     9/4 Thoracic Surgery: Chest tube removed. Denies SOB. CXR after chest tube removal shows minimal apical pneumothorax. Abdomen improved, slightly less distended on exam.  Passing gas and small bowel movement this morning. Encourage ambulation, PT/OT. Anticipate discharge to home 9/5. Physical Therapy discharge recommendation is home with home health rehab.     9/5 Thoracic Surgery: POD #4 s/p right thoracotomy with RUL lobectomy. Patient denies any pain or shortness of breath. Passing gas and had 3 bowel movements yesterday. Abdomen less distended. Discharge to home today. PE: AOx3. Normal breath sounds. R thoracotomy incision site CDI with staples. 9/5 Discharged to home with home health care.      Vital Signs:       Date/Time Temp Pulse Resp BP MAP (mmHg) SpO2 O2 Device   09/05/23 1100 -- -- -- -- -- 94 % None (Room air)   09/05/23 07:00:33 97.8 °F (36.6 °C) 75 20 139/85 103 95 % --   09/04/23 22:49:46 98.5 °F (36.9 °C) 82 20 135/83 100 91 % --   09/04/23 14:54:41 98.1 °F (36.7 °C) 82 16 137/84 102 92 % None (Room air)   09/04/23 10:45:34 97.7 °F (36.5 °C) 87 -- 143/85 104 91 % --   09/04/23 0800 -- -- -- -- -- -- None (Room air)   09/04/23 07:18:32 -- 80 20 144/82 103 93 % --   09/04/23 0400 98.8 °F (37.1 °C) 80 -- 134/80 -- 91 % None (Room air)   09/03/23 23:50:26 98.6 °F (37 °C) 82 18 162/82 109 92 % None (Room air)   09/03/23 19:12:21 98.6 °F (37 °C) 89 18 127/78 94 89 % Abnormal  --   09/03/23 11:42:09 -- 88 19 127/77 94 92 % --   09/03/23 07:24:27 97.8 °F (36.6 °C) 78 -- 120/75 90 92 % --   09/03/23 02:52:09 98.3 °F (36.8 °C) 77 -- 117/74 88 96 % --     Date and Time Temp Pulse SpO2    O2 Resp BP   09/03/23 2350 98.6 °F (37 °C) -- --  -- --   09/03/23 2350 -- 82 92 %  Room air 18 162/82   09/03/23 2133 -- -- --  -- --   09/03/23 2000 -- -- --  -- --   09/03/23 1912 98.6 °F (37 °C) 89 89 % (Abnormal)    18 127/78   09/03/23 1807 -- -- --  -- --   09/03/23 1643 -- -- --  -- --   09/03/23 1200 -- -- --  -- --   09/03/23 1142 -- 88 92 %  19 127/77   09/03/23 0724 97.8 °F (36.6 °C) 78 92 %  -- 120/75   09/03/23 0711 -- -- --  -- --   09/03/23 0551 -- -- --  -- --   09/03/23 0252 98.3 °F (36.8 °C) -- --  -- --   09/03/23 0252 -- 77 96 %  -- 117/74   09/03/23 0252 -- -- --  -- --   09/02/23 2306 97.9 °F (36.6 °C) -- --  -- --   09/02/23 2306 -- 91 94 %  -- 121/78 09/02/23 2305 -- -- --  -- --   09/02/23 2203 -- -- --  -- --   09/02/23 2016 98.6 °F (37 °C) 92 91 %  2 L NC  -- 156/90   09/02/23 1844 -- -- 94 %  2 L NC -- --   09/02/23 1800 -- -- 88 % (Abnormal)    Room air -- --   09/02/23 1752 -- -- --  -- --   09/02/23 1713 -- -- --  -- --   09/02/23 1547 98.8 °F (37.1 °C) 85 90 %  17 126/77   09/02/23 1342 -- -- --  -- --   09/02/23 1151 -- -- --  -- --   09/02/23 0838 -- -- --  -- -- 09/02/23 0800    2 L NC     09/02/23 0700 97.8 °F (36.6 °C) 71 98 %  20 113/63   09/02/23 0514 -- -- --  -- --   09/02/23 0414 -- 69 --  18 126/74   09/02/23 0300 -- -- 97 %  -- --   09/01/23 2301 -- -- --  -- --   09/01/23 2300 97.9 °F (36.6 °C) 76 97 %  -- 135/73   09/01/23 2247 -- -- --  -- --   09/01/23 2243 98.7 °F (37.1 °C) 76 95 %  3 L NC -- 130/58   09/01/23 2020 98.1 °F (36.7 °C) 84 96 %  -- 119/69   09/01/23 2008 -- -- --  -- --   09/01/23 1754 -- -- --  -- --   09/01/23 1600 98.1 °F (36.7 °C) 74 --  14 131/70   09/01/23 1515 97.5 °F (36.4 °C) 72 96 %  14 118/75   09/01/23 1500 -- 72 95 %  12 118/72   09/01/23 1445 -- 72 94 %  15 122/77   09/01/23 1440 -- -- 93 %  -- --   09/01/23 1431 -- -- --  -- --   09/01/23 1430 97.2 °F (36.2 °C) (Abnormal)   72 95 %  20 122/76   09/01/23 1411 97.4 °F (36.3 °C) (Abnormal)   72 97 %  2 L NC 18 118/74 09/01/23 0544 -- -- --  -- --   09/01/23 0544 98 °F (36.7 °C) 86 98 % Room air 16 150/90         Pertinent Labs/Diagnostic Results:     9/4 XR chest pa & lateral post- pull. Final Result by Marisa Danielson MD (09/05 1060)      Right chest tube removal with no pneumothorax. Mild pleural thickening versus small loculated effusion along the right chest wall. Mild left base atelectasis. 9/1 XR chest portable s/p lobectomy. Final Result by Obdulia Ravi DO (09/02 8174)      Small right apical pneumothorax with ipsilateral chest tube. Stable lobular fullness right mediastinal border, nonspecific. Left base subsegmental atelectasis. The study was marked in [facility] for immediate notification. 9/1 R chest portable post chest tube in OR   Final Result by Darryl Feliciano MD (09/01 1486)     Right-sided Port-A-Cath noted. Catheter tip at the cavoatrial junction. Right chest tube noted, the tip in the apex. Staples seen along the lateral right chest border.      Large right paracardiac and paramediastinal mass.       Previously noted right upper lobe mass is no longer apparent. Left mid and lower lung opacity likely due to pneumonia.               Results from last 7 days   Lab Units 09/04/23 0423 09/02/23  0514 09/01/23  1826 09/01/23 1214 09/01/23  1101   WBC Thousand/uL 8.24 12.49*  --   --   --    HEMOGLOBIN g/dL 11.6* 11.6*  --   --   --    I STAT HEMOGLOBIN g/dl  --   --   --  10.5* 10.5*   HEMATOCRIT % 36.5 36.8  --   --   --    HEMATOCRIT, ISTAT %  --   --   --  31* 31*   PLATELETS Thousands/uL 232 232 237  --   --    NEUTROS ABS Thousands/µL 5.76  --   --   --   --          Results from last 7 days   Lab Units 09/04/23 0423 09/02/23 0514 09/01/23 1214 09/01/23  1101   SODIUM mmol/L 138 138  --   --    POTASSIUM mmol/L 4.1 4.5  --   --    CHLORIDE mmol/L 103 104  --   --    CO2 mmol/L 28 27  --   --    CO2, I-STAT mmol/L  --   --  27 28   ANION GAP mmol/L 7 7  --   --    BUN mg/dL 13 16  --   --    CREATININE mg/dL 0.79 0.86  --   --    EGFR ml/min/1.73sq m 90 87  --   --    CALCIUM mg/dL 8.6 7.8*  --   --    CALCIUM, IONIZED, ISTAT mmol/L  --   --  1.13 1.14   MAGNESIUM mg/dL 1.9 1.9  --   --    PHOSPHORUS mg/dL  --  4.1  --   --              Results from last 7 days   Lab Units 09/04/23 0423 09/02/23  0514   GLUCOSE RANDOM mg/dL 125 132           Results from last 7 days   Lab Units 09/01/23 1214 09/01/23  1101   PH, JUSTUS I-STAT  7.262* 7.272*   PCO2, JUSTUS ISTAT mm HG 56.9* 57.7*   PO2, JUSTUS ISTAT mm HG 77.0* 79.0*   HCO3, JUSTUS ISTAT mmol/L 25.6 26.6   I STAT BASE EXC mmol/L -2 -1   I STAT O2 SAT % 93* 93*       Medications:   Scheduled Medications:    Scheduled Medications:  acetaminophen, 975 mg, Oral, Q6H LEN  amLODIPine, 10 mg, Oral, Daily  atorvastatin, 40 mg, Oral, After Dinner  enoxaparin, 40 mg, Subcutaneous, Daily  gabapentin, 100 mg, Oral, TID       polyethylene glycol (MIRALAX) packet 17 g  Dose: 17 g  Freq: Daily Route: PO  Start: 09/03/23 1000 End: 09/05/23 1546    senna (SENOKOT) tablet 8.6 mg  Dose: 1 tablet  Freq: Daily at bedtime Route: PO  Start: 09/03/23 2200 End: 09/05/23 1546    docusate sodium (COLACE) capsule 100 mg  Dose: 100 mg  Freq: Daily Route: PO  Start: 09/02/23 1115 End: 09/05/23 1546       Continuous IV Infusions: None. PRN Meds:    Medications 09/01 09/02 09/03 09/04 09/05   bisacodyl (DULCOLAX) rectal suppository 10 mg  Dose: 10 mg  Freq: Daily PRN Route: RE  PRN Reason: constipation  Start: 09/03/23 1025 End: 09/05/23 1546          0948      1546-D/C'd      HYDROmorphone (DILAUDID) injection 0.5 mg  Dose: 0.5 mg  Freq: Every 3 hours PRN Route: IV  PRN Reason: breakthrough pain  Start: 09/01/23 1557 End: 09/05/23 1546           1713      2358       1546-D/C'd      oxyCODONE (ROXICODONE) immediate release tablet 10 mg  Dose: 10 mg  Freq: Every 4 hours PRN Route: PO  PRN Reason: severe pain  Start: 09/01/23 1557 End: 09/05/23 1546          1754     2247      0838     1342     1752     2203      0252     0711     1200     1643     2133      0455     2773     0310     2110      2489     4318     1546-D/C'd      Medications 09/01 09/02 09/03 09/04 09/05                   Network Utilization Review Department  ATTENTION: Please call with any questions or concerns to [phone] and carefully listen to the prompts so that you are directed to the right person. All voicemails are confidential.  Izabella Lorna all requests for admission clinical reviews, approved or denied determinations and any other requests to dedicated fax number below belonging to the campus where the patient is receiving treatment.  List of dedicated fax numbers for the Facilities:  [place] DENIALS (Administrative/Medical Necessity) [phone] 2303 [facility] (Maternity/NICU/Pediatrics) [address] [place] [address] [phone]   [facility] [phone]   [address] [place] [address] [phone]   [address] [place] [address] [address] [facility] [address] [facility] [address] [address] [facility] [address] [facility] [address] [place] [address] [address] [phone]

## 2023-09-07 ENCOUNTER — TELEPHONE (OUTPATIENT)
Dept: CARDIAC SURGERY | Facility: CLINIC | Age: 68
End: 2023-09-07

## 2023-09-07 ENCOUNTER — TELEPHONE (OUTPATIENT)
Dept: HEMATOLOGY ONCOLOGY | Facility: CLINIC | Age: 68
End: 2023-09-07

## 2023-09-07 ENCOUNTER — HOME CARE VISIT (OUTPATIENT)
Dept: HOME HEALTH SERVICES | Facility: HOME HEALTHCARE | Age: 68
End: 2023-09-07
Payer: COMMERCIAL

## 2023-09-07 VITALS — DIASTOLIC BLOOD PRESSURE: 84 MMHG | HEART RATE: 58 BPM | OXYGEN SATURATION: 98 % | SYSTOLIC BLOOD PRESSURE: 156 MMHG

## 2023-09-07 DIAGNOSIS — R91.1 LUNG NODULE: ICD-10-CM

## 2023-09-07 DIAGNOSIS — G89.12 ACUTE POST-THORACOTOMY PAIN: Primary | ICD-10-CM

## 2023-09-07 PROCEDURE — G0151 HHCP-SERV OF PT,EA 15 MIN: HCPCS

## 2023-09-07 PROCEDURE — 10330081 VN NO-PAY CLAIM PROCEDURE

## 2023-09-07 PROCEDURE — 400013 VN SOC

## 2023-09-07 RX ORDER — METHOCARBAMOL 500 MG/1
500 TABLET, FILM COATED ORAL 4 TIMES DAILY
Qty: 20 TABLET | Refills: 0 | Status: SHIPPED | OUTPATIENT
Start: 2023-09-07 | End: 2023-09-12

## 2023-09-07 RX ORDER — GABAPENTIN 300 MG/1
300 CAPSULE ORAL 3 TIMES DAILY
Qty: 42 CAPSULE | Refills: 0 | Status: SHIPPED | OUTPATIENT
Start: 2023-09-07 | End: 2023-09-21

## 2023-09-07 NOTE — TELEPHONE ENCOUNTER
Surgeon/Procedure/Date: Maxi Parvez 9/1/23  Follow up appt:   9/20/23  Call completed with patient's wife      1. Constipation: (Yes: Colace 100 mg BID, Senokot 2 tabs QHS or Senokot S 2 tabs qhs   No: Dulcolax/Miralax/suppository/enema)    Denies     2. Pain Management: (Oxycodone 5-10 mg prn, Tylenol 650 mg q6 hrs and +/- Advil 600 mg TID for 7 days. Neurontin 300 mg TID for 21 days, or Neurontin 100 mg TID for patients over 70)  Patient having a lot of pain. Taking Tylenol 650 mg every 3 hours. Advised to only take Tylenol every 6 hours. Gabapentin increased to 300 mgTID and Robaxin 500 mg QID prn added by August Marsh PA-C. Advised to call office if no improvement in pain control. 3.  Fever/Chills/Cough/shortness of breath:  (Call for temp >100.4 )  Denies fever or chills. Occasional cough at times productive unsure of color  SOB with exertion. Advised to call for temp.>100.4, increase in cough or SOB. 4. Incisions: (Warmth, redness, drainage? May shower, no baths. No creams, lotions, or ointments to incisions)  Clean and dry. 5.  Ambulation/Incentive Spirometry: (Any activity? Use IS every 15 min) Using IS as directed, ambulatory in house.

## 2023-09-07 NOTE — TELEPHONE ENCOUNTER
Patient Call    Who are you speaking with? Spouse    If it is not the patient, are they listed on an active communication consent form? Yes   What is the reason for this call? Patients spouse calling to speak with Devang Watts. She spoke with her earlier and didn't realize patient only had enough medication oxycodone 5mg until Sunday     Does this require a call back? yes   If a call back is required, please list best call back number [phone]   If a call back is required, advise that a message will be forwarded to their care team and someone will return their call as soon as possible. Did you relay this information to the patient?  yes

## 2023-09-08 DIAGNOSIS — R91.1 LUNG NODULE: ICD-10-CM

## 2023-09-08 RX ORDER — OXYCODONE HYDROCHLORIDE 5 MG/1
5 TABLET ORAL EVERY 6 HOURS PRN
Qty: 20 TABLET | Refills: 0 | Status: SHIPPED | OUTPATIENT
Start: 2023-09-08 | End: 2023-09-18

## 2023-09-08 NOTE — CASE COMMUNICATION
[facility] has admitted your patient to [facility] service with the following disciplines:    PT    This report is informational only, no responses is needed    Primary focus of home health care. [facility].  Musculoskeletal    Patient stated goals of care. .. “I want to get back to normal.. back to walking and riding my bike. Back to painting and printing.”    Anticipated visit pattern and next visit date… 1xwkx1, 2xwkx2. Significant clinical findings. . Pt demonstrates heighted pain, balance deficits with pt at elevated fall risk, endurance deficits with sob leading to difficulty with ambulation, decreased safety awareness regarding mobility and home environment, infection risk due to incisions. Potential barriers to goal achievement. . pain, fatigue, shortness of breath. Thank you for allowing us to participate in the care of your patient.

## 2023-09-11 ENCOUNTER — HOME CARE VISIT (OUTPATIENT)
Dept: HOME HEALTH SERVICES | Facility: HOME HEALTHCARE | Age: 68
End: 2023-09-11
Payer: COMMERCIAL

## 2023-09-11 VITALS — OXYGEN SATURATION: 87 % | HEART RATE: 92 BPM

## 2023-09-11 DIAGNOSIS — E78.00 HYPERCHOLESTEROLEMIA: ICD-10-CM

## 2023-09-11 PROCEDURE — 88305 TISSUE EXAM BY PATHOLOGIST: CPT | Performed by: PATHOLOGY

## 2023-09-11 PROCEDURE — 88313 SPECIAL STAINS GROUP 2: CPT | Performed by: PATHOLOGY

## 2023-09-11 PROCEDURE — 88342 IMHCHEM/IMCYTCHM 1ST ANTB: CPT | Performed by: PATHOLOGY

## 2023-09-11 PROCEDURE — G0151 HHCP-SERV OF PT,EA 15 MIN: HCPCS

## 2023-09-11 PROCEDURE — 88309 TISSUE EXAM BY PATHOLOGIST: CPT | Performed by: PATHOLOGY

## 2023-09-11 PROCEDURE — 88312 SPECIAL STAINS GROUP 1: CPT | Performed by: PATHOLOGY

## 2023-09-11 PROCEDURE — 88341 IMHCHEM/IMCYTCHM EA ADD ANTB: CPT | Performed by: PATHOLOGY

## 2023-09-11 RX ORDER — ATORVASTATIN CALCIUM 40 MG/1
40 TABLET, FILM COATED ORAL
Qty: 30 TABLET | Refills: 0 | Status: SHIPPED | OUTPATIENT
Start: 2023-09-11 | End: 2023-10-11

## 2023-09-13 ENCOUNTER — TELEPHONE (OUTPATIENT)
Dept: CARDIAC SURGERY | Facility: CLINIC | Age: 68
End: 2023-09-13

## 2023-09-13 ENCOUNTER — HOME CARE VISIT (OUTPATIENT)
Dept: HOME HEALTH SERVICES | Facility: HOME HEALTHCARE | Age: 68
End: 2023-09-13
Payer: COMMERCIAL

## 2023-09-13 PROCEDURE — G0151 HHCP-SERV OF PT,EA 15 MIN: HCPCS

## 2023-09-13 NOTE — TELEPHONE ENCOUNTER
I called and spoke with Selinda Holstein. He was currently working with his physical therapist.  Overall doing fine and denying any major issues. I will call him back later to discuss his pathology with him. He voiced understanding appreciation for the call.

## 2023-09-14 ENCOUNTER — TELEPHONE (OUTPATIENT)
Dept: CARDIAC SURGERY | Facility: CLINIC | Age: 68
End: 2023-09-14

## 2023-09-15 VITALS — SYSTOLIC BLOOD PRESSURE: 128 MMHG | DIASTOLIC BLOOD PRESSURE: 76 MMHG | OXYGEN SATURATION: 88 % | HEART RATE: 92 BPM

## 2023-09-15 NOTE — CASE COMMUNICATION
Pt discharged from [facility]. Pt independent with mobility and has begun ambulating within community.

## 2023-09-18 ENCOUNTER — TELEPHONE (OUTPATIENT)
Dept: CARDIAC SURGERY | Facility: CLINIC | Age: 68
End: 2023-09-18

## 2023-09-18 NOTE — TELEPHONE ENCOUNTER
AYANNAM for pt in regards to post-op appointment with Dr. Amanda Cruz on 09/20/23. I informed pt that he will need to get a CXR prior to this appointment. I advised the pt to please call the office back if he has any questions regarding this.

## 2023-09-19 PROCEDURE — G0180 MD CERTIFICATION HHA PATIENT: HCPCS | Performed by: THORACIC SURGERY (CARDIOTHORACIC VASCULAR SURGERY)

## 2023-09-20 ENCOUNTER — OFFICE VISIT (OUTPATIENT)
Dept: CARDIAC SURGERY | Facility: CLINIC | Age: 68
End: 2023-09-20

## 2023-09-20 ENCOUNTER — DOCUMENTATION (OUTPATIENT)
Dept: CARDIAC SURGERY | Facility: CLINIC | Age: 68
End: 2023-09-20

## 2023-09-20 VITALS
HEIGHT: 69 IN | HEART RATE: 74 BPM | SYSTOLIC BLOOD PRESSURE: 144 MMHG | RESPIRATION RATE: 15 BRPM | OXYGEN SATURATION: 98 % | BODY MASS INDEX: 27.92 KG/M2 | TEMPERATURE: 98.3 F | WEIGHT: 188.49 LBS | DIASTOLIC BLOOD PRESSURE: 83 MMHG

## 2023-09-20 DIAGNOSIS — G89.12 ACUTE POST-THORACOTOMY PAIN: ICD-10-CM

## 2023-09-20 DIAGNOSIS — C34.91 CARCINOMA OF RIGHT LUNG (HCC): Primary | ICD-10-CM

## 2023-09-20 PROCEDURE — 99024 POSTOP FOLLOW-UP VISIT: CPT | Performed by: THORACIC SURGERY (CARDIOTHORACIC VASCULAR SURGERY)

## 2023-09-20 RX ORDER — METHOCARBAMOL 500 MG/1
500 TABLET, FILM COATED ORAL 4 TIMES DAILY PRN
Qty: 20 TABLET | Refills: 0 | Status: SHIPPED | OUTPATIENT
Start: 2023-09-20 | End: 2023-09-25

## 2023-09-20 NOTE — PROGRESS NOTES
I met with Yuly Interiano and his wife at his visit with Dr. Kirsten Chapman today. He's familiar with me from prior visits. Questions answered, support provided as able.

## 2023-09-20 NOTE — ASSESSMENT & PLAN NOTE
Mr. Courtney Otero presents for a post-operative visit following right robotic converted to open right upper lobectomy following neoadjuvant chemotherapy. His thoracotomy is causing him to have muscle spasms for which Robaxin did seem to help. I will refill this. He will continue Tylenol and gabapentin. He will obtain a CXR now. We discussed his pathology which demonstrated T2b, N2 disease. 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 Given the lymph node involvement, he will need adjuvant chemoradiation. He is established with both Dr. Carissa Mora and Dr. Pitts Early. We will move up the radiation appointment. He can begin treatment after 10/10. He will return in 4 weeks for another visit to ensure he continues to progress. All of his and his wife's questions were addressed.

## 2023-09-20 NOTE — PROGRESS NOTES
Assessment/Plan:    Carcinoma of right lung [facility])  Mr. Lisa Jefferson presents for a post-operative visit following right robotic converted to open right upper lobectomy following neoadjuvant chemotherapy. His thoracotomy is causing him to have muscle spasms for which Robaxin did seem to help. I will refill this. He will continue Tylenol and gabapentin. He will obtain a CXR now. We discussed his pathology which demonstrated T2b, N2 disease. 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 Given the lymph node involvement, he will need adjuvant chemoradiation. He is established with both Dr. Dereck Buchanan and Dr. Mai Baez. We will move up the radiation appointment. He can begin treatment after 10/10. He will return in 4 weeks for another visit to ensure he continues to progress. All of his and his wife's questions were addressed. Diagnoses and all orders for this visit:    Carcinoma of right lung ([address])  -     Cancel: Ambulatory Referral to Radiation Oncology; Future    Acute post-thoracotomy pain  -     methocarbamol (ROBAXIN) 500 mg tablet; Take 1 tablet (500 mg total) by mouth 4 (four) times a day as needed for muscle spasms for up to 5 days          Thoracic History   Cancer Staging   Carcinoma of right lung [facility])  Staging form: Lung, AJCC 8th Edition  - Clinical: Stage JAY (cT3, cN1, cM1b) - Signed by Jessica Cabrera MD on 4/13/2023    Oncology History   Metastatic adenocarcinoma ([address])   2023 Initial Diagnosis    Metastatic adenocarcinoma ([address])     3/23/2023 Biopsy    Brain, right frontal mass (biopsy):  - Metastatic non-small cell carcinoma     Comment:  - Tumor cells stain diffusely for CAM5.2, CKAE1/3, CK7, TTF1 and NapsinA with absent CK20, p63, CK5/6, p40 expression. This immunopanel favors a metastatic lung adenocarcinoma.        5/18/2023 -  Chemotherapy    cyanocobalamin, 1,000 mcg, Intramuscular, Once, 3 of 3 cycles  Administration: 1,000 mcg (6/29/2023), 1,000 mcg (5/18/2023), 1,000 mcg (7/20/2023)  alteplase (CATHFLO), 2 mg, Intracatheter, Every 1 Minute as needed, 4 of 4 cycles  pegfilgrastim (Latrelle Mini), 6 mg, Subcutaneous, Once, 3 of 3 cycles  Administration: 6 mg (6/8/2023), 6 mg (6/29/2023), 6 mg (7/20/2023)  fosaprepitant (EMEND) IVPB, 150 mg, Intravenous, Once, 4 of 4 cycles  Administration: 150 mg (5/18/2023), 150 mg (6/29/2023), 150 mg (7/20/2023), 150 mg (6/8/2023)  nivolumab (OPDIVO) IVPB, 360 mg (150 % of original dose 240 mg), Intravenous, Once, 4 of 4 cycles  Dose modification: 360 mg (original dose 240 mg, Cycle 1, Reason: Dose modified as per discussion with consulting physician)  Administration: 360 mg (5/18/2023), 360 mg (6/8/2023), 360 mg (6/29/2023), 360 mg (7/20/2023)  CARBOplatin (PARAPLATIN) IVPB (GOG AUC DOSING), 642.5 mg, Intravenous, Once, 4 of 4 cycles  Administration: 642.5 mg (5/18/2023), 642.5 mg (6/29/2023), 566.5 mg (7/20/2023), 650 mg (6/8/2023)  pemetrexed (ALIMTA) chemo infusion, 980 mg, Intravenous, Once, 4 of 4 cycles  Administration: 1,000 mg (5/18/2023), 1,000 mg (6/29/2023), 1,000 mg (7/20/2023), 1,000 mg (6/8/2023)     Carcinoma of right lung ([address])   4/13/2023 Initial Diagnosis    Carcinoma of right lung ([address])     4/13/2023 -  Cancer Staged    Staging form: Lung, AJCC 8th Edition  - Clinical: Stage JAY (cT3, cN1, cM1b) - Signed by Audrey Walker MD on 4/13/2023 4/19/2023 - 4/28/2023 Radiation    Plan ID Energy Fractions Dose per Fraction (cGy) Dose Correction (cGy) Total Dose Delivered (cGy) Elapsed Days   SRT R Frontal 6X-FFF 5 / 5 600 0 3,000 9         5/18/2023 -  Chemotherapy    cyanocobalamin, 1,000 mcg, Intramuscular, Once, 3 of 3 cycles  Administration: 1,000 mcg (6/29/2023), 1,000 mcg (5/18/2023), 1,000 mcg (7/20/2023)  alteplase (CATHFLO), 2 mg, Intracatheter, Every 1 Minute as needed, 4 of 4 cycles  pegfilgrastim (NEULASTA ONPRO), 6 mg, Subcutaneous, Once, 3 of 3 cycles  Administration: 6 mg (6/8/2023), 6 mg (6/29/2023), 6 mg (7/20/2023)  fosaprepitant (EMEND) IVPB, 150 mg, Intravenous, Once, 4 of 4 cycles  Administration: 150 mg (5/18/2023), 150 mg (6/29/2023), 150 mg (7/20/2023), 150 mg (6/8/2023)  nivolumab (OPDIVO) IVPB, 360 mg (150 % of original dose 240 mg), Intravenous, Once, 4 of 4 cycles  Dose modification: 360 mg (original dose 240 mg, Cycle 1, Reason: Dose modified as per discussion with consulting physician)  Administration: 360 mg (5/18/2023), 360 mg (6/8/2023), 360 mg (6/29/2023), 360 mg (7/20/2023)  CARBOplatin (PARAPLATIN) IVPB (GOG AUC DOSING), 642.5 mg, Intravenous, Once, 4 of 4 cycles  Administration: 642.5 mg (5/18/2023), 642.5 mg (6/29/2023), 566.5 mg (7/20/2023), 650 mg (6/8/2023)  pemetrexed (ALIMTA) chemo infusion, 980 mg, Intravenous, Once, 4 of 4 cycles  Administration: 1,000 mg (5/18/2023), 1,000 mg (6/29/2023), 1,000 mg (7/20/2023), 1,000 mg (6/8/2023)     9/1/2023 -  Cancer Staged    Staging form: Lung, AJCC 8th Edition  - Pathologic stage from 9/1/2023: Stage JAY (pT2b, pN2, cM1b) - Signed by Lucy Hernandez PA-C on 9/20/2023  Histopathologic type: Adenocarcinoma, NOS  Stage prefix: Initial diagnosis  Histologic grade (G): G3  Histologic grading system: 4 grade system       Diagnosis: adenocarcinoma RUL  Procedure: robotic converted to open right upper lobectomy 9/1/23. Pathology: 4.4x3.5x2.7cm invasive adenocarcinoma, G3, + invasion, +lymphatic invasion. 5/22 lymph nodes involved, including 2R and 11R. Distant mets to right frontal lobe of brain. Stage IV (pT2b, pN2, pM1b). Subjective:    Patient ID: Richie Chard is a 77 y.o. male. ecog 1. HPI   Mr. Josias Urias is a 77year old man who underwent a robotic converted to open right upper lobectomy on 9/1/23 after completion of neoadjuvant chemotherapy. He had a slight post-operative ileus which resolved, and he was discharged on 9/5. On discussion, his pain is not too bad for pain. He is taking Tylenol and gabapentin.  He is rarely taking the oxycodone. His breathing feels "pretty good". He is using his IS up to 1750. It is not quite at baseline. He is not coughing. No fevers, sleep disturbance or appetite change. He is going for walks which wears him night. The following portions of the patient's history were reviewed and updated as appropriate: allergies, current medications, past family history, past medical history, past social history, past surgical history and problem list.    Review of Systems   Constitutional: Positive for fatigue. Eyes: Negative. Respiratory: Positive for shortness of breath. Cardiovascular: Positive for chest pain. Gastrointestinal: Positive for abdominal distention. Negative for constipation. Musculoskeletal: Negative. Skin: Negative. Neurological: Negative. Hematological: Negative. Psychiatric/Behavioral: Negative. All other systems reviewed and are negative. Objective:   Physical Exam  Vitals reviewed. Constitutional:       General: He is not in acute distress. Appearance: Normal appearance. He is well-developed. He is not diaphoretic. HENT:      Head: Normocephalic and atraumatic. Nose: Nose normal.   Eyes:      General: No scleral icterus. Extraocular Movements: Extraocular movements intact. Neck:      Trachea: No tracheal deviation. Cardiovascular:      Rate and Rhythm: Normal rate and regular rhythm. Pulses: Normal pulses. Heart sounds: Normal heart sounds. No murmur heard. Pulmonary:      Effort: Pulmonary effort is normal. No respiratory distress. Breath sounds: Normal breath sounds. No wheezing. Comments: Right robotic and thoracotomy incisions healing well. Staples removed and prolene suture. Abdominal:      General: Bowel sounds are normal. There is distension. Palpations: Abdomen is soft. Musculoskeletal:         General: Normal range of motion. Cervical back: Normal range of motion and neck supple.       Right lower leg: No edema. Left lower leg: No edema. Lymphadenopathy:      Cervical: No cervical adenopathy. Skin:     General: Skin is warm and dry. Neurological:      Mental Status: He is alert and oriented to person, place, and time. Cranial Nerves: No cranial nerve deficit. Psychiatric:         Mood and Affect: Mood normal.         Behavior: Behavior normal.         Thought Content: Thought content normal.     /83 (BP Location: Left arm, Patient Position: Sitting, Cuff Size: Standard)   Pulse 74   Temp 98.3 °F (36.8 °C) (Temporal)   Resp 15   Ht 5' 9" (1.753 m)   Wt 85.5 kg (188 lb 7.9 oz)   SpO2 98%   BMI 27.84 kg/m²     XR chest pa & lateral    Result Date: 9/5/2023  Impression Right chest tube removal with no pneumothorax. Mild pleural thickening versus small loculated effusion along the right chest wall. Mild left base atelectasis. Workstation performed: [id number]        CT chest abdomen pelvis w contrast    Result Date: 3/21/2023  Narrative CT CHEST, ABDOMEN AND PELVIS WITH IV CONTRAST INDICATION:   metastatic brain mass, RUL lung mass seen on CT head neck. COMPARISON:  None. TECHNIQUE: CT examination of the chest, abdomen and pelvis was performed. Axial, sagittal, and coronal 2D reformatted images were created from the source data and submitted for interpretation. Radiation dose length product (DLP) for this visit:  1044.99 mGy-cm . This examination, like all CT scans performed in the [facility], was performed utilizing techniques to minimize radiation dose exposure, including the use of iterative reconstruction and automated exposure control. IV Contrast:  100 mL of iohexol (OMNIPAQUE) Enteric Contrast: Enteric contrast was administered. FINDINGS: CHEST LUNGS:  Right upper lobe lung mass seen measuring 3.8 x 3.5 cm.   A pleural tag is seen extending from this mass with associated minimal overlying pleural thickening appendix There is no extension of the neck mass to the chest wall There is no contralateral suspicious lung nodule PLEURA:  Mild pleural thickening overlying the area of the right upper lobe mass HEART/GREAT VESSELS: Heart is unremarkable for patient's age. No thoracic aortic aneurysm. Ascending aorta measures 4.2 cm MEDIASTINUM AND ROSEMARY:  Lower right paratracheal lymph nodes are seen measuring about 7.5 mm Subcarinal lymph nodes are seen measuring 6 mm Enlarged right hilar lymph node seen measuring about 1.7 cm CHEST WALL AND LOWER NECK:  No significant axillary lymph node enlargement seen there are no lymphadenopathy in the lower neck ABDOMEN LIVER/BILIARY TREE:  No focal liver lesion GALLBLADDER:  No calcified gallstones. No pericholecystic inflammatory change. SPLEEN:  Unremarkable. PANCREAS:  Unremarkable. ADRENAL GLANDS:  Unremarkable. KIDNEYS/URETERS:  Right renal cyst seen STOMACH AND BOWEL:  No abnormal dilation of the small bowel loops seen Ileocecal junction appear unremarkable The stomach is mildly distended. Mild thickening of the antrum, nonspecific APPENDIX:  No findings to suggest appendicitis. ABDOMINOPELVIC CAVITY:  No ascites. No pneumoperitoneum. Small para-aortic lymph nodes do not meet the criteria for pathological alignment on the bases VESSELS:  Celiac trunk, SMA appear unremarkable EMMA appear unremarkable PELVIS REPRODUCTIVE ORGANS:  Unremarkable for patient's age. URINARY BLADDER:  Unremarkable. ABDOMINAL WALL/INGUINAL REGIONS:  Umbilical hernia seen containing fat OSSEOUS STRUCTURES:  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.,  Congenital No lytic destructive changes     Impression 3.8 x 3.5 cm right upper lobe lung mass with associated overlying pleural tag and adjacent to pleural thickening, this may be due to pleural reaction or mild pleural invasion No contralateral lung nodules or mass Right hilar lymphadenopathy.   Small lower right paratracheal left paratracheal lymph node do not meet the criteria for pathologic enlargement on the bases of size or morphology There is no CT evidence of for metastatic disease in the abdomen and pelvis No lytic destructive lesion in the visualized bony skeleton The study was marked in EPIC for significant notification. Workstation performed: [id number]      NM PET CT skull base to mid thigh    Result Date: 7/18/2023  Narrative PET/CT SCAN INDICATION: C34.11: Malignant neoplasm of upper lobe, right bronchus or lung, metastatic right non-small cell lung cancer, status post neoadjuvant chemotherapy, resection of right frontal lobe mass. MODIFIER: PS COMPARISON: CTA chest June 27, 2023, MRI brain June 2023, PET/CT April 2023, CT chest abdomen and pelvis March 2023 CELL TYPE: Non-small cell neoplasm TECHNIQUE:   8.1 mCi F-18-FDG administered IV. Multiplanar attenuation corrected and non attenuation corrected PET images are available for interpretation, and contiguous, low dose, axial CT sections were obtained from the vertex through the femurs. Intravenous contrast material was not utilized. This examination, like all CT scans performed in the [facility], was performed utilizing techniques to minimize radiation dose exposure, including the use of iterative reconstruction and automated exposure control. Fasting serum glucose: 108 mg/dl FINDINGS: Mediastinal blood flow SUV max 2 Hepatic parenchyma SUV max 2.7 VISUALIZED BRAIN: Postsurgical changes, status post right frontal craniotomy. HEAD/NECK: There is a physiologic distribution of FDG. No FDG avid cervical adenopathy is seen. CT images: Unremarkable. CHEST: Hypermetabolic mass is again demonstrated in the right upper lobe with SUV max of 19 compared to 20 on the previous exam image 107. Right upper paratracheal node is nonspecific with SUV max of 1.9. Size is unchanged. There is interval resolution of hypermetabolic right hilar adenopathy. CT images: Right portacatheter.  Right upper lobe mass measures 3.5 x 3.8 cm series 3/107, compared to 4 x 3.9 cm. Right paratracheal nodes, measuring 8 to 10 mm are unchanged and were not showing definite increased metabolic activity. Right hilar adenopathy cannot be assessed on unenhanced exam. Coronary artery calcifications. Gynecomastia. ABDOMEN: No FDG avid soft tissue lesions are seen. CT images: Atherosclerotic disease of the abdominal aorta without aneurysmal dilatation. Fat-containing umbilical hernia. Fat-containing left inguinal hernia. PELVIS: No FDG avid soft tissue lesions are seen. CT images: Unremarkable. OSSEOUS STRUCTURES: No FDG avid lesions are seen. 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 Grade 1 anterolisthesis of L5 on S1. Impression 1. Interval resolution of metabolic activity in association with right hilar nodes with slightly decrease in size of right upper lobe mass. 2. No evidence for metastatic disease to the abdomen or pelvis. 3. Small right paratracheal nodes with very minimal activity which is not diagnostic for malignancy and unchanged. Workstation performed: [id number]     NM PET CT skull base to mid thigh    Result Date: 4/28/2023  Narrative PET/CT SCAN INDICATION: C79.9: Secondary malignant neoplasm of unspecified site Newly diagnosed metastatic adenocarcinoma of the lung. Evaluate disease extent. MODIFIER: PS COMPARISON: No prior PET scans. Prior CT of chest abdomen and pelvis, 3/20/2023 CELL TYPE: Metastatic non-small cell carcinoma, diagnosed from resection of right frontal brain mass TECHNIQUE:   8.6 mCi F-18-FDG administered IV. Multiplanar attenuation corrected and non attenuation corrected PET images are available for interpretation, and contiguous, low dose, axial CT sections were obtained from the skull base through the femurs. Intravenous contrast material was not utilized.  This examination, like all CT scans performed in the [facility], was performed utilizing techniques to minimize radiation dose exposure, including the use of iterative reconstruction and automated exposure control. Fasting serum glucose: 94 mg/dl FINDINGS: VISUALIZED BRAIN: Postsurgical changes, right frontal lobe. Please refer to prior MRI reports HEAD/NECK: There is a physiologic distribution of FDG. No FDG avid cervical adenopathy is seen. CT images: Unremarkable. CHEST: -Right upper lobe lobular marginated 3.6 x 3.4 cm mass touching the right lateral pleural surface, SUV max 20.0. - Multifocal FDG avid right hilar adenopathy, SUV max 6.6 - Trace nonspecific activity within a right paratracheal lymph node which measures 0.9 cm in short axis dimension, SUV max 2.0. Otherwise no evidence of FDG avid mediastinal adenopathy CT images: [id number] catheter is present. No pleural or pericardial effusion. There is coronary artery calcification ABDOMEN: No FDG avid soft tissue lesions are seen. CT images: Shotty retroperitoneal lymph nodes demonstrate no abnormal activity. There is no ascites or hydronephrosis. There is an umbilical adipose containing hernia PELVIS: No FDG avid soft tissue lesions are seen. CT images: Small left inguinal adipose containing hernia. OSSEOUS STRUCTURES: No FDG avid lesions are seen. CT images: Postsurgical changes right frontal bone. Grade 1 anterolisthesis and degenerative disc disease at L5-S1. Than     Impression 1. FDG avid right upper lobe mass in keeping with primary bronchogenic carcinoma of the lung 2. FDG avid multifocal right hilar adenopathy 3.  Trace nonspecific activity within a right paratracheal lymph node with short axis diameter 0.9 cm. 4. No evidence of FDG avid metastatic disease within the neck, abdomen, pelvis, or skeleton Workstation performed: [id number]

## 2023-09-26 ENCOUNTER — HOSPITAL ENCOUNTER (OUTPATIENT)
Dept: RADIOLOGY | Facility: HOSPITAL | Age: 68
Discharge: HOME | End: 2023-09-26
Payer: COMMERCIAL

## 2023-09-26 DIAGNOSIS — R91.1 LUNG NODULE: ICD-10-CM

## 2023-09-26 PROCEDURE — 71046 X-RAY EXAM CHEST 2 VIEWS: CPT

## 2023-09-27 ENCOUNTER — DOCUMENTATION (OUTPATIENT)
Dept: HEMATOLOGY ONCOLOGY | Facility: CLINIC | Age: 68
End: 2023-09-27

## 2023-09-27 NOTE — PROGRESS NOTES
Read requested for:    XR chest pa & lateral (Order #[id number]) on 9/26/23    Verbal confirmation from Glenn Fajardo.

## 2023-09-28 ENCOUNTER — TELEPHONE (OUTPATIENT)
Dept: HEMATOLOGY ONCOLOGY | Facility: CLINIC | Age: 68
End: 2023-09-28

## 2023-09-28 ENCOUNTER — RADIATION ONCOLOGY FOLLOW-UP (OUTPATIENT)
Dept: RADIATION ONCOLOGY | Facility: HOSPITAL | Age: 68
End: 2023-09-28
Attending: STUDENT IN AN ORGANIZED HEALTH CARE EDUCATION/TRAINING PROGRAM
Payer: COMMERCIAL

## 2023-09-28 ENCOUNTER — OFFICE VISIT (OUTPATIENT)
Dept: HEMATOLOGY ONCOLOGY | Facility: CLINIC | Age: 68
End: 2023-09-28
Payer: COMMERCIAL

## 2023-09-28 VITALS
DIASTOLIC BLOOD PRESSURE: 78 MMHG | RESPIRATION RATE: 16 BRPM | BODY MASS INDEX: 28.14 KG/M2 | HEART RATE: 73 BPM | WEIGHT: 190 LBS | SYSTOLIC BLOOD PRESSURE: 140 MMHG | HEIGHT: 69 IN | TEMPERATURE: 97.8 F | OXYGEN SATURATION: 97 %

## 2023-09-28 VITALS
BODY MASS INDEX: 28.32 KG/M2 | TEMPERATURE: 98.3 F | HEART RATE: 70 BPM | SYSTOLIC BLOOD PRESSURE: 128 MMHG | WEIGHT: 191.2 LBS | DIASTOLIC BLOOD PRESSURE: 74 MMHG | RESPIRATION RATE: 18 BRPM | OXYGEN SATURATION: 95 % | HEIGHT: 69 IN

## 2023-09-28 DIAGNOSIS — I10 HYPERTENSION: ICD-10-CM

## 2023-09-28 DIAGNOSIS — C34.91 CARCINOMA OF RIGHT LUNG (HCC): Primary | ICD-10-CM

## 2023-09-28 DIAGNOSIS — C79.31 METASTASIS TO BRAIN (HCC): ICD-10-CM

## 2023-09-28 DIAGNOSIS — C79.9 METASTATIC ADENOCARCINOMA (HCC): Primary | ICD-10-CM

## 2023-09-28 DIAGNOSIS — I10 ESSENTIAL HYPERTENSION: ICD-10-CM

## 2023-09-28 DIAGNOSIS — Z76.0 MEDICATION REFILL: ICD-10-CM

## 2023-09-28 PROCEDURE — 99215 OFFICE O/P EST HI 40 MIN: CPT | Performed by: INTERNAL MEDICINE

## 2023-09-28 PROCEDURE — G0463 HOSPITAL OUTPT CLINIC VISIT: HCPCS | Performed by: STUDENT IN AN ORGANIZED HEALTH CARE EDUCATION/TRAINING PROGRAM

## 2023-09-28 PROCEDURE — 99211 OFF/OP EST MAY X REQ PHY/QHP: CPT | Performed by: STUDENT IN AN ORGANIZED HEALTH CARE EDUCATION/TRAINING PROGRAM

## 2023-09-28 PROCEDURE — 99215 OFFICE O/P EST HI 40 MIN: CPT | Performed by: STUDENT IN AN ORGANIZED HEALTH CARE EDUCATION/TRAINING PROGRAM

## 2023-09-28 RX ORDER — AMLODIPINE BESYLATE 10 MG/1
10 TABLET ORAL DAILY
Qty: 90 TABLET | Refills: 0 | Status: SHIPPED | OUTPATIENT
Start: 2023-09-28

## 2023-09-28 RX ORDER — LOSARTAN POTASSIUM 50 MG/1
50 TABLET ORAL DAILY
Qty: 90 TABLET | Refills: 0 | Status: SHIPPED | OUTPATIENT
Start: 2023-09-28

## 2023-09-28 NOTE — PROGRESS NOTES
Hematology/Oncology Outpatient Follow-up  Blue De Jesus 77 y.o. male 1955 [id number]    Date:  9/28/2023        Assessment and Plan:  1. Carcinoma of right lung ([address])  I had a lengthy discussion with the patient and his wife regarding the recent surgical resection of his non-small cell lung cancer from the right upper lobe which showed final pathology of The final pathology was pT2b, pN2, G3.  R0. After 4 cycles of neoadjuvant treatment with combination of chemotherapy with carboplatin/Alimta and nivolumab as immunotherapy. The patient was told that the neoadjuvant treatment possibly resulted in partial response at the best in the mediastinal area. The size of the tumor in the right upper lobe area however seems to be unchanged according to the pathological description. The patient is in the process of seeing the radiation oncology team to discuss the potential benefit of adjuvant radiation for R0 resection and pT2b, pN2 pathology. The patient was told that we will not pursue any further chemotherapy adjuvantly at this point since he did receive already 4 cycles neoadjuvantly. We do have data to support pursuing adjuvant immunotherapy with nivolumab which can be given on every 4-week basis for somewhere between up to 12 months since his PD-L1 was 100% with a high TMB. The patient was told that to you finalize the adjuvant treatment plan after he meets with the radiation oncology team.    - CBC and differential; Future  - Comprehensive metabolic panel; Future  - Magnesium; Future    2. Metastasis to brain [facility])  He is status post surgical resection of the oligometastatic Brain lesion followed by postoperative RT with SRS/SRT in 5 fractions to avoid local recurrence. [facility] will be getting imaging, most likely with MRI on every 3 months basis to monitor for recurrence. HPI:  The patient came today for follow-up visit accompanied by his wife.   He completed 4 cycles worth of neoadjuvant treatment with nivolumab, Alimta/carboplatin on 7/20/2023. The patient then underwent right upper lobectomy on 9/1/2023. The pathology showed invasive poorly differentiated solid adenocarcinoma in the right upper lobe measuring 4.4 cm, lymphatic channel invasion was noted. 3 out of 13 lymph nodes were positive in the right upper lobe area. 1 out of 3 lymph node from the level 2R was involved with metastatic disease. 1 out of 1 lymph node was involved at level of 11. The final pathology was pT2b, pN2, G3.  R0. Poorly differentiated. Oncology History Overview Note   Patient is a 77year old man with Stage JAY ([id number]) lung adenocarcinoma s/p resection of a right frontal metastasis. On 4/28/23 he completed a course of postoperative SRT to a dose of 3000cGy in 5 fractions. He was last seen for follow up 7/5/2023. He returns today for follow up s/p right robotic converted to open right upper lobectomy. Pathology which demonstrated T2b, N2 disease, indicative of requiring adjuvant chemoradiation. 7/18/23 NM PET CT skull base to mid thigh  IMPRESSION:  1. Interval resolution of metabolic activity in association with right hilar nodes with slightly decrease in size of right upper lobe mass. 2. No evidence for metastatic disease to the abdomen or pelvis. 3. Small right paratracheal nodes with very minimal activity which is not diagnostic for malignancy and unchanged. 7/19/23 Hematology Oncology - Dr. Sharifa Juárez  PET CT after 3 cycles of chemotherapy/immunotherapy showed partial response. Due for cycle 4 tomorrow  Will be seen by thoracic surgery to evaluate for surgical resection  If he is a surgical candidate, will see him after the completion of the surgical resection to better understand the exact pathological response. He will be continued on adjuvant immunotherapy for at least a year.     8/9/23 Thoracic Surgery - Eber Khan PA-C  He is a candidate for surgical resection, which would include right robotic assisted thoracoscopic upper lobectomy, possible right thoracotomy. Scheduled for Friday, September 1 -  Will wait till he is 6 weeks out from his chemotherapy    8/10/23 Hematology Oncology - Dr. Lee Gist   He had 4 cycles worth of neoadjuvant treatment with carbo/Alimta and nivolumab. We will see him after the planned surgery to discuss the pathology and the continuation of adjuvant immunotherapy most likely with nivolumab for a total of a year. 9/1/23 Surgery - Dr. Nayeli Winn   1. Robotic converted to right thoracotomy with right upper lobectomy  2. Extensive mediastinal lymph node dissection  3. Right T4 through right T7 intercostal nerve cryoablation  4. Right T3-T10 intercostal nerve block with Exparel  5. Bronchoscopy  Final Diagnosis:  A. Lung, right upper lobe:     - Invasive poorly differentiated solid adenocarcinoma of the lung, 4.4 cm.     - Tumor invades into, but not through, the visceral pleura (PL1), confirmed by special VVG stains. - Lymphatic channel invasion by tumor identified. - Metastatic carcinoma present in three of thirteen lymph nodes (3/13). -- Rare fibrotic granulomas present. - All margins are negative for tumor.     - Emphysematous changes. B.  Lymph node, level 8:     - Negative for malignancy (0/1). C.  Lymph node, level 7, #1:     - Negative for malignancy (0/1). D.  Lymph node, level 4R:     - Negative for malignancy (0/1). E.  Lymph node, level 4R, #2:     - Fibrovascular adipose tissue; negative for malignancy.     - No lymphoid tissue identified. F.  Lymph node, level 2R, #1:     - Metastatic carcinoma present in one of three lymph nodes (1/3). G.  Lymph node, level 2R, #2:     - Negative for malignancy (0/1). H.  Lymph node, level 11 posterior:     - Single lymph node positive for metastatic carcinoma (1/1). I.  Lymph node, portion of level 12 on artery:     - Negative for malignancy (0/1). - Non-caseating granulomatous inflammation present. -- Special stains for acid fast bacilli and fungal organisms are negative. 23 Thoracic Surgery - Anastacia Rodríguez PA-C  Post-operative visit following right robotic converted to open right upper lobectomy following neoadjuvant chemotherapy. Pathology which demonstrated T2b, N2 disease. Given the lymph node involvement, he will need adjuvant chemoradiation. He is established with both Dr. Pavel Romo and Dr. Claudia Mckinney. We will move up the radiation appointment. He can begin treatment after 10/10. Upcomin23 - Hematology Oncology  10/5/23 - MRI brain  10/18/23 Thoracic Surgery  23 Neurology        Metastatic adenocarcinoma ([address])    Initial Diagnosis    Metastatic adenocarcinoma ([address])     3/23/2023 Biopsy    Brain, right frontal mass (biopsy):  - Metastatic non-small cell carcinoma     Comment:  - Tumor cells stain diffusely for CAM5.2, CKAE1/3, CK7, TTF1 and NapsinA with absent CK20, p63, CK5/6, p40 expression. This immunopanel favors a metastatic lung adenocarcinoma.        2023 -  Chemotherapy    cyanocobalamin, 1,000 mcg, Intramuscular, Once, 3 of 3 cycles  Administration: 1,000 mcg (2023), 1,000 mcg (2023), 1,000 mcg (2023)  alteplase (CATHFLO), 2 mg, Intracatheter, Every 1 Minute as needed, 4 of 4 cycles  pegfilgrastim ([place]), 6 mg, Subcutaneous, Once, 3 of 3 cycles  Administration: 6 mg (2023), 6 mg (2023), 6 mg (2023)  fosaprepitant (EMEND) IVPB, 150 mg, Intravenous, Once, 4 of 4 cycles  Administration: 150 mg (2023), 150 mg (2023), 150 mg (2023), 150 mg (2023)  nivolumab (OPDIVO) IVPB, 360 mg (150 % of original dose 240 mg), Intravenous, Once, 4 of 4 cycles  Dose modification: 360 mg (original dose 240 mg, Cycle 1, Reason: Dose modified as per discussion with consulting physician)  Administration: 360 mg (2023), 360 mg (2023), 360 mg (2023), 360 mg (2023)  CARBOplatin (PARAPLATIN) IVPB ([facility] AUC DOSING), 642.5 mg, Intravenous, Once, 4 of 4 cycles  Administration: 642.5 mg (5/18/2023), 642.5 mg (6/29/2023), 566.5 mg (7/20/2023), 650 mg (6/8/2023)  pemetrexed (ALIMTA) chemo infusion, 980 mg, Intravenous, Once, 4 of 4 cycles  Administration: 1,000 mg (5/18/2023), 1,000 mg (6/29/2023), 1,000 mg (7/20/2023), 1,000 mg (6/8/2023)     Carcinoma of right lung ([address])   4/13/2023 Initial Diagnosis    Carcinoma of right lung ([address])     4/13/2023 -  Cancer Staged    Staging form: Lung, AJCC 8th Edition  - Clinical: Stage JAY (cT3, cN1, cM1b) - Signed by Luther Malik MD on 4/13/2023 4/19/2023 - 4/28/2023 Radiation    Plan ID Energy Fractions Dose per Fraction (cGy) Dose Correction (cGy) Total Dose Delivered (cGy) Elapsed Days   SRT R Frontal 6X-FFF 5 / 5 600 0 3,000 9         5/18/2023 -  Chemotherapy    cyanocobalamin, 1,000 mcg, Intramuscular, Once, 3 of 3 cycles  Administration: 1,000 mcg (6/29/2023), 1,000 mcg (5/18/2023), 1,000 mcg (7/20/2023)  alteplase (CATHFLO), 2 mg, Intracatheter, Every 1 Minute as needed, 4 of 4 cycles  pegfilgrastim ([place]atosha[place] Ran), 6 mg, Subcutaneous, Once, 3 of 3 cycles  Administration: 6 mg (6/8/2023), 6 mg (6/29/2023), 6 mg (7/20/2023)  fosaprepitant (EMEND) IVPB, 150 mg, Intravenous, Once, 4 of 4 cycles  Administration: 150 mg (5/18/2023), 150 mg (6/29/2023), 150 mg (7/20/2023), 150 mg (6/8/2023)  nivolumab (OPDIVO) IVPB, 360 mg (150 % of original dose 240 mg), Intravenous, Once, 4 of 4 cycles  Dose modification: 360 mg (original dose 240 mg, Cycle 1, Reason: Dose modified as per discussion with consulting physician)  Administration: 360 mg (5/18/2023), 360 mg (6/8/2023), 360 mg (6/29/2023), 360 mg (7/20/2023)  CARBOplatin (PARAPLATIN) IVPB (GOG AUC DOSING), 642.5 mg, Intravenous, Once, 4 of 4 cycles  Administration: 642.5 mg (5/18/2023), 642.5 mg (6/29/2023), 566.5 mg (7/20/2023), 650 mg (6/8/2023)  pemetrexed (ALIMTA) chemo infusion, 980 mg, Intravenous, Once, 4 of 4 cycles  Administration: 1,000 mg (5/18/2023), 1,000 mg (6/29/2023), 1,000 mg (7/20/2023), 1,000 mg (6/8/2023)     9/1/2023 -  Cancer Staged    Staging form: Lung, AJCC 8th Edition  - Pathologic stage from 9/1/2023: Stage JAY (pT2b, pN2, cM1b) - Signed by Nestor Bullard PA-C on 9/20/2023  Histopathologic type: Adenocarcinoma, NOS  Stage prefix: Initial diagnosis  Histologic grade (G): G3  Histologic grading system: 4 grade system       9/1/2023 Surgery    Right robotic converted to open upper lobectomy      9/1/2023 Biopsy    A. Lung, right upper lobe:     - Invasive poorly differentiated solid adenocarcinoma of the lung, 4.4 cm.     - Tumor invades into, but not through, the visceral pleura (PL1), confirmed by special VVG stains. - Lymphatic channel invasion by tumor identified. - Metastatic carcinoma present in three of thirteen lymph nodes (3/13). -- Rare fibrotic granulomas present. - All margins are negative for tumor.     - Emphysematous changes. B.  Lymph node, level 8:     - Negative for malignancy (0/1). C.  Lymph node, level 7, #1:     - Negative for malignancy (0/1). D.  Lymph node, level 4R:     - Negative for malignancy (0/1). E.  Lymph node, level 4R, #2:     - Fibrovascular adipose tissue; negative for malignancy.     - No lymphoid tissue identified. F.  Lymph node, level 2R, #1:     - Metastatic carcinoma present in one of three lymph nodes (1/3). G.  Lymph node, level 2R, #2:     - Negative for malignancy (0/1). H.  Lymph node, level 11 posterior:     - Single lymph node positive for metastatic carcinoma (1/1). I.  Lymph node, portion of level 12 on artery:     - Negative for malignancy (0/1). - Non-caseating granulomatous inflammation present. -- Special stains for acid fast bacilli and fungal organisms are negative. Interval history:    ROS: Review of Systems   Constitutional: Positive for fatigue. Negative for chills and fever.    HENT: Negative for ear pain and sore throat. Eyes: Negative for pain and visual disturbance. Respiratory: Positive for shortness of breath. Negative for cough. Cardiovascular: Negative for chest pain and palpitations. Gastrointestinal: Negative for abdominal pain and vomiting. Genitourinary: Negative for dysuria and hematuria. Musculoskeletal: Negative for arthralgias and back pain. Skin: Negative for color change and rash. Neurological: Negative for seizures and syncope. Psychiatric/Behavioral: Positive for sleep disturbance. All other systems reviewed and are negative.       Past Medical History:   Diagnosis Date   • Cancer ([address] 2001    Receint lung and brain lesions and prostate cancer in 2001   • Hypertension    • Prostate cancer ([address]) 2001   • Rectal bleeding    • Stroke [facility])     2011         Past Surgical History:   Procedure Laterality Date   • COLONOSCOPY     • CRANIOTOMY Right 3/23/2023    Procedure: Right frontal CRANIOTOMY IMAGE-GUIDED FOR TUMOR;  Surgeon: Tomas Keys MD;  Location: [facility];  Service: Neurosurgery   • IR PORT PLACEMENT  4/27/2023   •  [address] INCL FLUOR GDNCE DX W/CELL WASHG [facility] N/A 9/1/2023    Procedure: Tiny Miller;  Surgeon: Jenelle Holloway MD;  Location: [facility];  Service: Thoracic   • MT CYSTOURETHROSCOPY N/A 3/23/2023    Procedure: Thomas Weaver;  Surgeon: Alphonse Gilmore MD;  [facility];  Service: Urology   • MT THORACOSCOPY W/LOBECTOMY SINGLE LOBE Right 9/1/2023    Procedure: robotic assisted converted to open right upper lobectomy, lymph node dissection;  Surgeon: Jenelle Holloway MD;  [facility];  Service: Thoracic   • PROSTATECTOMY  2001   • THORACOTOMY Right 9/1/2023    Procedure: right thoracotomy with Cryo-Ablation;  Surgeon: Jenelle Holloway MD;  [facility];  Service: Thoracic   • TONSILLECTOMY         Social History     Socioeconomic History   • Marital status: /Civil Union     Spouse name: None   • Number of children: None   • Years of education: None   • Highest education level: None   Occupational History   • None   Tobacco Use   • Smoking status: Former     Packs/day: 1.00     Years: 15.00     Total pack years: 15.00     Types: Cigarettes     Quit date: 44     Years since quittin.7     Passive exposure: Never   • Smokeless tobacco: Never   • Tobacco comments:     i quit when i was 28. not sure of exact dates   Vaping Use   • Vaping Use: Never used   Substance and Sexual Activity   • Alcohol use: Yes     Alcohol/week: 6.0 standard drinks of alcohol     Types: 6 Cans of beer per week     Comment: socially  last drink 3/23   • Drug use: Yes     Types: Marijuana     Comment: gummies foro nausea with chemo   • Sexual activity: Yes     Partners: Female     Birth control/protection: None   Other Topics Concern   • None   Social History Narrative   • None     Social Determinants of Health     Financial Resource Strain: Not on file   Food Insecurity: No Food Insecurity (2023)    Hunger Vital Sign    • Worried About Running Out of Food in the Last Year: Never true    • Ran Out of Food in the Last Year: Never true   Transportation Needs: No Transportation Needs (2023)    PRAPARE - Transportation    • Lack of Transportation (Medical): No    • Lack of Transportation (Non-Medical):  No   Physical Activity: Not on file   Stress: Not on file   Social Connections: Not on file   Intimate Partner Violence: Not on file   Housing Stability: Low Risk  (2023)    Housing Stability Vital Sign    • Unable to Pay for Housing in the Last Year: No    • Number of Places Lived in the Last Year: 1    • Unstable Housing in the Last Year: No       Family History   Problem Relation Age of Onset   • Dementia Mother            • Breast cancer Sister            • Prostate cancer Brother         Received Radiation       No Known Allergies      Current Outpatient Medications:   •  atorvastatin (LIPITOR) 40 mg tablet, Take 1 tablet (40 mg total) by mouth daily after dinner, Disp: 30 tablet, Rfl: 0  •  docusate sodium (COLACE) 100 mg capsule, Take 1 capsule (100 mg total) by mouth daily Do not start before September 6, 2023., Disp: 20 capsule, Rfl: 0  •  folic acid (KP Folic Acid) 1 mg tablet, Take 1 tablet (1 mg total) by mouth daily, Disp: 30 tablet, Rfl: 11  •  Magnesium Oxide, Elemental, 400 MG TABS, Take 400 mg by mouth in the morning, Disp: 30 tablet, Rfl: 6  •  ondansetron (ZOFRAN) 8 mg tablet, Take 1 tablet (8 mg total) by mouth every 8 (eight) hours as needed for nausea or vomiting, Disp: 20 tablet, Rfl: 3  •  amLODIPine (NORVASC) 10 mg tablet, Take 1 tablet (10 mg total) by mouth daily, Disp: 90 tablet, Rfl: 0  •  gabapentin (Neurontin) 300 mg capsule, Take 1 capsule (300 mg total) by mouth 3 (three) times a day for 14 days, Disp: 42 capsule, Rfl: 0  •  losartan (COZAAR) 50 mg tablet, Take 1 tablet (50 mg total) by mouth daily, Disp: 90 tablet, Rfl: 0  •  methocarbamol (ROBAXIN) 500 mg tablet, Take 1 tablet (500 mg total) by mouth 4 (four) times a day as needed for muscle spasms for up to 5 days, Disp: 20 tablet, Rfl: 0      Physical Exam:  /78 (BP Location: Left arm, Patient Position: Sitting, Cuff Size: Adult)   Pulse 73   Temp 97.8 °F (36.6 °C)   Resp 16   Ht 5' 9" (1.753 m)   Wt 86.2 kg (190 lb)   SpO2 97%   BMI 28.06 kg/m²     Physical Exam  Constitutional:       Appearance: He is well-developed. HENT:      Head: Normocephalic and atraumatic. Nose: Nose normal.   Eyes:      General: No scleral icterus. Right eye: No discharge. Left eye: No discharge. Conjunctiva/sclera: Conjunctivae normal.      Pupils: Pupils are equal, round, and reactive to light. Neck:      Thyroid: No thyromegaly. Trachea: No tracheal deviation. Cardiovascular:      Rate and Rhythm: Normal rate and regular rhythm. Heart sounds: Normal heart sounds. No murmur heard.      No friction rub.   Pulmonary:      Effort: Pulmonary effort is normal. No respiratory distress. Breath sounds: Normal breath sounds. No wheezing or rales. Chest:      Chest wall: No tenderness. Abdominal:      General: There is no distension. Palpations: Abdomen is soft. There is no hepatomegaly or splenomegaly. Tenderness: There is no abdominal tenderness. There is no guarding or rebound. Musculoskeletal:         General: No tenderness or deformity. Normal range of motion. Cervical back: Normal range of motion and neck supple. Lymphadenopathy:      Cervical: No cervical adenopathy. Skin:     General: Skin is warm and dry. Coloration: Skin is not pale. Findings: No erythema or rash. Neurological:      Mental Status: He is alert and oriented to person, place, and time. Cranial Nerves: No cranial nerve deficit. Coordination: Coordination normal.      Deep Tendon Reflexes: Reflexes are normal and symmetric. Psychiatric:         Behavior: Behavior normal.         Thought Content: Thought content normal.         Judgment: Judgment normal.           Labs:  Lab Results   Component Value Date    WBC 8.24 09/04/2023    HGB 11.6 (L) 09/04/2023    HCT 36.5 09/04/2023    MCV 95 09/04/2023     09/04/2023     Lab Results   Component Value Date     05/10/2018    K 4.1 09/04/2023     09/04/2023    CO2 28 09/04/2023    ANIONGAP 11.6 05/10/2018    BUN 13 09/04/2023    CREATININE 0.79 09/04/2023    GLUCOSE 190 (H) 09/01/2023    GLUF 116 (H) 06/27/2023    CALCIUM 8.6 09/04/2023    AST 15 07/19/2023    ALT 10 07/19/2023    ALKPHOS 79 07/19/2023    PROT 7.2 05/10/2018    BILITOT 0.8 05/10/2018    EGFR 90 09/04/2023     No results found for: "TSH"    Patient voiced understanding and agreement in the above discussion. Aware to contact our office with questions/symptoms in the interim.

## 2023-09-28 NOTE — PROGRESS NOTES
Blue De Jesus 1955 is a 77 y.o. male  with Stage JAY ([id number]) lung adenocarcinoma s/p resection of a right frontal metastasis. On 4/28/23 he completed a course of postoperative SRT to a dose of 3000cGy in 5 fractions. He was last seen for follow up 7/5/2023. He returns today for follow up s/p right robotic converted to open right upper lobectomy. Pathology which demonstrated T2b, N2 disease, indicative of requiring adjuvant chemoradiation. 7/18/23 NM PET CT skull base to mid thigh   IMPRESSION:  1. Interval resolution of metabolic activity in association with right hilar nodes with slightly decrease in size of right upper lobe mass. 2. No evidence for metastatic disease to the abdomen or pelvis. 3. Small right paratracheal nodes with very minimal activity which is not diagnostic for malignancy and unchanged. 7/19/23 Hematology Oncology - Dr. Isabella Grady  PET CT after 3 cycles of chemotherapy/immunotherapy showed partial response. Due for cycle 4 tomorrow  Will be seen by thoracic surgery to evaluate for surgical resection  If he is a surgical candidate, will see him after the completion of the surgical resection to better understand the exact pathological response. He will be continued on adjuvant immunotherapy for at least a year. 8/9/23 Thoracic Surgery - Paxton Cuello PA-C  He is a candidate for surgical resection, which would include right robotic assisted thoracoscopic upper lobectomy, possible right thoracotomy. Scheduled for Friday, September 1 -  Will wait till he is 6 weeks out from his chemotherapy    8/10/23 Hematology Oncology - Dr. Isablela Grady   He had 4 cycles worth of neoadjuvant treatment with carbo/Alimta and nivolumab. We will see him after the planned surgery to discuss the pathology and the continuation of adjuvant immunotherapy most likely with nivolumab for a total of a year. 9/1/23 Surgery - Dr. Darryl Fontenot   1. Robotic converted to right thoracotomy with right upper lobectomy  2. Extensive mediastinal lymph node dissection  3. Right T4 through right T7 intercostal nerve cryoablation  4. Right T3-T10 intercostal nerve block with Exparel  5. Bronchoscopy  Final Diagnosis:  A. Lung, right upper lobe:     - Invasive poorly differentiated solid adenocarcinoma of the lung, 4.4 cm.     - Tumor invades into, but not through, the visceral pleura (PL1), confirmed by special VVG stains. - Lymphatic channel invasion by tumor identified. - Metastatic carcinoma present in three of thirteen lymph nodes (3/13). -- Rare fibrotic granulomas present. - All margins are negative for tumor.     - Emphysematous changes. B.  Lymph node, level 8:     - Negative for malignancy (0/1). C.  Lymph node, level 7, #1:     - Negative for malignancy (0/1). D.  Lymph node, level 4R:     - Negative for malignancy (0/1). E.  Lymph node, level 4R, #2:     - Fibrovascular adipose tissue; negative for malignancy.     - No lymphoid tissue identified. F.  Lymph node, level 2R, #1:     - Metastatic carcinoma present in one of three lymph nodes (1/3). G.  Lymph node, level 2R, #2:     - Negative for malignancy (0/1). H.  Lymph node, level 11 posterior:     - Single lymph node positive for metastatic carcinoma (1/1). I.  Lymph node, portion of level 12 on artery:     - Negative for malignancy (0/1). - Non-caseating granulomatous inflammation present. -- Special stains for acid fast bacilli and fungal organisms are negative. 9/20/23 Thoracic Surgery - Dede Sims PA-C  Post-operative visit following right robotic converted to open right upper lobectomy following neoadjuvant chemotherapy. Pathology which demonstrated T2b, N2 disease. Given the lymph node involvement, he will need adjuvant chemoradiation. He is established with both Dr. Nikole Miller and Dr. Iker Oviedo. We will move up the radiation appointment.  He can begin treatment after 10/10.      9/28/23 - Hematology Oncology  The final pathology was pT2b, pN2, G3.  R0. After 4 cycles of neoadjuvant treatment with combination of chemotherapy with carboplatin/Alimta and nivolumab as immunotherapy. Patient is in the process of seeing the radiation oncology team to discuss the potential benefit of adjuvant radiation for R0 resection and pT2b, pN2 pathology. The patient was told that we will not pursue any further chemotherapy adjuvantly at this point since he did receive already 4 cycles neoadjuvantly. Potential adjuvant immunotherapy, will finalize plan after meeting with radiation     10/5/23 - MRI brain  10/18/23 Thoracic Surgery  12/18/23 Neurology         Oncology History   Metastatic adenocarcinoma [facility])   2023 Initial Diagnosis    Metastatic adenocarcinoma ([address])     3/23/2023 Biopsy    Brain, right frontal mass (biopsy):  - Metastatic non-small cell carcinoma     Comment:  - Tumor cells stain diffusely for CAM5.2, CKAE1/3, CK7, TTF1 and NapsinA with absent CK20, p63, CK5/6, p40 expression. This immunopanel favors a metastatic lung adenocarcinoma.        5/18/2023 -  Chemotherapy    cyanocobalamin, 1,000 mcg, Intramuscular, Once, 3 of 3 cycles  Administration: 1,000 mcg (6/29/2023), 1,000 mcg (5/18/2023), 1,000 mcg (7/20/2023)  alteplase (CATHFLO), 2 mg, Intracatheter, Every 1 Minute as needed, 4 of 4 cycles  pegfilgrastim (Morales Baraga), 6 mg, Subcutaneous, Once, 3 of 3 cycles  Administration: 6 mg (6/8/2023), 6 mg (6/29/2023), 6 mg (7/20/2023)  fosaprepitant (EMEND) IVPB, 150 mg, Intravenous, Once, 4 of 4 cycles  Administration: 150 mg (5/18/2023), 150 mg (6/29/2023), 150 mg (7/20/2023), 150 mg (6/8/2023)  nivolumab (OPDIVO) IVPB, 360 mg (150 % of original dose 240 mg), Intravenous, Once, 4 of 4 cycles  Dose modification: 360 mg (original dose 240 mg, Cycle 1, Reason: Dose modified as per discussion with consulting physician)  Administration: 360 mg (5/18/2023), 360 mg (6/8/2023), 360 mg (6/29/2023), 360 mg (7/20/2023)  CARBOplatin (PARAPLATIN) IVPB (GOG AUC DOSING), 642.5 mg, Intravenous, Once, 4 of 4 cycles  Administration: 642.5 mg (5/18/2023), 642.5 mg (6/29/2023), 566.5 mg (7/20/2023), 650 mg (6/8/2023)  pemetrexed (ALIMTA) chemo infusion, 980 mg, Intravenous, Once, 4 of 4 cycles  Administration: 1,000 mg (5/18/2023), 1,000 mg (6/29/2023), 1,000 mg (7/20/2023), 1,000 mg (6/8/2023)     Carcinoma of right lung ([address])   4/13/2023 Initial Diagnosis    Carcinoma of right lung ([address])     4/13/2023 -  Cancer Staged    Staging form: Lung, AJCC 8th Edition  - Clinical: Stage JAY (cT3, cN1, cM1b) - Signed by Audrey Walker MD on 4/13/2023 4/19/2023 - 4/28/2023 Radiation    Plan ID Energy Fractions Dose per Fraction (cGy) Dose Correction (cGy) Total Dose Delivered (cGy) Elapsed Days   SRT R Frontal 6X-FFF 5 / 5 600 0 3,000 9         5/18/2023 -  Chemotherapy    cyanocobalamin, 1,000 mcg, Intramuscular, Once, 3 of 3 cycles  Administration: 1,000 mcg (6/29/2023), 1,000 mcg (5/18/2023), 1,000 mcg (7/20/2023)  alteplase (CATHFLO), 2 mg, Intracatheter, Every 1 Minute as needed, 4 of 4 cycles  pegfilgrastim (Latrelle Mini), 6 mg, Subcutaneous, Once, 3 of 3 cycles  Administration: 6 mg (6/8/2023), 6 mg (6/29/2023), 6 mg (7/20/2023)  fosaprepitant (EMEND) IVPB, 150 mg, Intravenous, Once, 4 of 4 cycles  Administration: 150 mg (5/18/2023), 150 mg (6/29/2023), 150 mg (7/20/2023), 150 mg (6/8/2023)  nivolumab (OPDIVO) IVPB, 360 mg (150 % of original dose 240 mg), Intravenous, Once, 4 of 4 cycles  Dose modification: 360 mg (original dose 240 mg, Cycle 1, Reason: Dose modified as per discussion with consulting physician)  Administration: 360 mg (5/18/2023), 360 mg (6/8/2023), 360 mg (6/29/2023), 360 mg (7/20/2023)  CARBOplatin (PARAPLATIN) IVPB (GO AUC DOSING), 642.5 mg, Intravenous, Once, 4 of 4 cycles  Administration: 642.5 mg (5/18/2023), 642.5 mg (6/29/2023), 566.5 mg (7/20/2023), 650 mg (6/8/2023)  pemetrexed (ALIMTA) chemo infusion, 980 mg, Intravenous, Once, 4 of 4 cycles  Administration: 1,000 mg (5/18/2023), 1,000 mg (6/29/2023), 1,000 mg (7/20/2023), 1,000 mg (6/8/2023)     9/1/2023 -  Cancer Staged    Staging form: Lung, AJCC 8th Edition  - Pathologic stage from 9/1/2023: Stage JAY (pT2b, pN2, cM1b) - Signed by Julio Padilla PA-C on 9/20/2023  Histopathologic type: Adenocarcinoma, NOS  Stage prefix: Initial diagnosis  Histologic grade (G): G3  Histologic grading system: 4 grade system       9/1/2023 Surgery    Right robotic converted to open upper lobectomy      9/1/2023 Biopsy    A. Lung, right upper lobe:     - Invasive poorly differentiated solid adenocarcinoma of the lung, 4.4 cm.     - Tumor invades into, but not through, the visceral pleura (PL1), confirmed by special VVG stains. - Lymphatic channel invasion by tumor identified. - Metastatic carcinoma present in three of thirteen lymph nodes (3/13). -- Rare fibrotic granulomas present. - All margins are negative for tumor.     - Emphysematous changes. B.  Lymph node, level 8:     - Negative for malignancy (0/1). C.  Lymph node, level 7, #1:     - Negative for malignancy (0/1). D.  Lymph node, level 4R:     - Negative for malignancy (0/1). E.  Lymph node, level 4R, #2:     - Fibrovascular adipose tissue; negative for malignancy.     - No lymphoid tissue identified. F.  Lymph node, level 2R, #1:     - Metastatic carcinoma present in one of three lymph nodes (1/3). G.  Lymph node, level 2R, #2:     - Negative for malignancy (0/1). H.  Lymph node, level 11 posterior:     - Single lymph node positive for metastatic carcinoma (1/1). I.  Lymph node, portion of level 12 on artery:     - Negative for malignancy (0/1). - Non-caseating granulomatous inflammation present. -- Special stains for acid fast bacilli and fungal organisms are negative. Review of Systems:  Review of Systems   Constitutional: Positive for activity change and fatigue.    Eyes: Wears glasses   Respiratory: Positive for cough (Occasional, dry), shortness of breath (On exertion) and wheezing (Intermittent). Gastrointestinal: Negative. Endocrine: Negative. Genitourinary: Negative. Musculoskeletal:        Pain to surgical site, right chest, sore   Skin: Negative. Allergic/Immunologic: Negative. Neurological: Negative. Hematological: Negative. Psychiatric/Behavioral: Negative. Clinical Trial: no      Health Maintenance   Topic Date Due   • Hepatitis A Vaccine (1 of 2 - Risk 2-dose series) Never done   • Hepatitis B Vaccine (1 of 3 - Risk 3-dose series) Never done   • COVID-19 Vaccine (3 - Pfizer risk series) 05/14/2021   • BMI: Followup Plan  03/21/2023   • Annual Physical  09/22/2023   • Influenza Vaccine (1) 09/01/2023   • Fall Risk  04/18/2024   • Depression Screening  09/28/2024   • BMI: Adult  09/28/2024   • DTaP,Tdap,and Td Vaccines (2 - Td or Tdap) 04/26/2028   • Colorectal Cancer Screening  08/10/2031   • Hepatitis C Screening  Completed   • Pneumococcal Vaccine: 65+ Years  Completed   • HIB Vaccine  Aged Out   • IPV Vaccine  Aged Out   • Meningococcal ACWY Vaccine  Aged Out   • HPV Vaccine  Aged Out     Patient Active Problem List   Diagnosis   • Negative depression screening   • Overweight (BMI 25.0-29. 9)   • Essential hypertension   • Annual physical exam   • CVA (cerebral vascular accident) ([address])   • Hypercholesterolemia   • Brain mass   • Brain compression ([address])   • Cerebral edema (HCC)   • Lung nodule   • Metastatic adenocarcinoma ([address])   • Carcinoma of right lung ([address])   • Encounter for central line care   • Chemotherapy-induced neutropenia (HCC)   • History of CVA (cerebrovascular accident)     Past Medical History:   Diagnosis Date   • Cancer ([address]) 2001    Receint lung and brain lesions and prostate cancer in 2001   • Hypertension    • Prostate cancer ([address]) 2001   • Rectal bleeding    • Stroke [facility])     2011       Past Surgical History: Procedure Laterality Date   • COLONOSCOPY     • CRANIOTOMY Right 3/23/2023    Procedure: Right frontal CRANIOTOMY IMAGE-GUIDED FOR TUMOR;  Surgeon: Paul Kamara MD;  [facility];  Service: Neurosurgery   • IR PORT PLACEMENT  2023   •  [address] INCL FLUOR GDNCE DX W/CELL WASHG [facility] N/A 2023    Procedure: [place];  Surgeon: Kendal Gardner MD;  [facility];  Service: Thoracic   • FL CYSTOURETHROSCOPY N/A 3/23/2023    Procedure: Mariann Shell;  Surgeon: Cate Platt MD;  [facility];  Service: Urology   • FL THORACOSCOPY W/LOBECTOMY SINGLE LOBE Right 2023    Procedure: robotic assisted converted to open right upper lobectomy, lymph node dissection;  Surgeon: Kendal Gardner MD;  [facility];  Service: Thoracic   • PROSTATECTOMY     • THORACOTOMY Right 2023    Procedure: right thoracotomy with Cryo-Ablation;  Surgeon: Kendal Gardner MD;  [facility];  Service: Thoracic   • TONSILLECTOMY       Family History   Problem Relation Age of Onset   • Dementia Mother            • Breast cancer Sister            • Prostate cancer Brother         Received Radiation     Social History     Socioeconomic History   • Marital status: /Civil Union     Spouse name: Not on file   • Number of children: Not on file   • Years of education: Not on file   • Highest education level: Not on file   Occupational History   • Not on file   Tobacco Use   • Smoking status: Former     Packs/day: 1.00     Years: 15.00     Total pack years: 15.00     Types: Cigarettes     Quit date: 44     Years since quittin.7     Passive exposure: Never   • Smokeless tobacco: Never   • Tobacco comments:     i quit when i was 28. not sure of exact dates   Vaping Use   • Vaping Use: Never used   Substance and Sexual Activity   • Alcohol use:  Yes     Alcohol/week: 6.0 standard drinks of alcohol     Types: 6 Cans of beer per week     Comment: socially  last drink 3/23   • Drug use: Yes     Types: Marijuana     Comment: gummies foro nausea with chemo   • Sexual activity: Yes     Partners: Female     Birth control/protection: None   Other Topics Concern   • Not on file   Social History Narrative   • Not on file     Social Determinants of Health     Financial Resource Strain: Not on file   Food Insecurity: No Food Insecurity (9/5/2023)    Hunger Vital Sign    • Worried About Running Out of Food in the Last Year: Never true    • Ran Out of Food in the Last Year: Never true   Transportation Needs: No Transportation Needs (9/5/2023)    PRAPARE - Transportation    • Lack of Transportation (Medical): No    • Lack of Transportation (Non-Medical):  No   Physical Activity: Not on file   Stress: Not on file   Social Connections: Not on file   Intimate Partner Violence: Not on file   Housing Stability: Low Risk  (9/5/2023)    Housing Stability Vital Sign    • Unable to Pay for Housing in the Last Year: No    • Number of Places Lived in the Last Year: 1    • Unstable Housing in the Last Year: No       Current Outpatient Medications:   •  amLODIPine (NORVASC) 10 mg tablet, Take 1 tablet (10 mg total) by mouth daily, Disp: 90 tablet, Rfl: 0  •  atorvastatin (LIPITOR) 40 mg tablet, Take 1 tablet (40 mg total) by mouth daily after dinner, Disp: 30 tablet, Rfl: 0  •  docusate sodium (COLACE) 100 mg capsule, Take 1 capsule (100 mg total) by mouth daily Do not start before September 6, 2023., Disp: 20 capsule, Rfl: 0  •  folic acid (KP Folic Acid) 1 mg tablet, Take 1 tablet (1 mg total) by mouth daily, Disp: 30 tablet, Rfl: 11  •  losartan (COZAAR) 50 mg tablet, Take 1 tablet (50 mg total) by mouth daily, Disp: 90 tablet, Rfl: 0  •  Magnesium Oxide, Elemental, 400 MG TABS, Take 400 mg by mouth in the morning, Disp: 30 tablet, Rfl: 6  •  ondansetron (ZOFRAN) 8 mg tablet, Take 1 tablet (8 mg total) by mouth every 8 (eight) hours as needed for nausea or vomiting, Disp: 20 tablet, Rfl: 3  •  gabapentin (Neurontin) 300 mg capsule, Take 1 capsule (300 mg total) by mouth 3 (three) times a day for 14 days, Disp: 42 capsule, Rfl: 0  •  methocarbamol (ROBAXIN) 500 mg tablet, Take 1 tablet (500 mg total) by mouth 4 (four) times a day as needed for muscle spasms for up to 5 days, Disp: 20 tablet, Rfl: 0  No Known Allergies  Vitals:    09/28/23 1451   BP: 128/74   BP Location: Left arm   Patient Position: Sitting   Cuff Size: Standard   Pulse: 70   Resp: 18   Temp: 98.3 °F (36.8 °C)   TempSrc: Temporal   SpO2: 95%   Weight: 86.7 kg (191 lb 3.2 oz)   Height: 5' 9" (1.753 m)      Pain Score:   4

## 2023-09-29 ENCOUNTER — TELEPHONE (OUTPATIENT)
Dept: HEMATOLOGY ONCOLOGY | Facility: CLINIC | Age: 68
End: 2023-09-29

## 2023-09-29 DIAGNOSIS — G89.12 ACUTE POST-THORACOTOMY PAIN: ICD-10-CM

## 2023-09-29 RX ORDER — GABAPENTIN 300 MG/1
300 CAPSULE ORAL 3 TIMES DAILY
Qty: 63 CAPSULE | Refills: 0 | Status: SHIPPED | OUTPATIENT
Start: 2023-09-29 | End: 2023-10-20

## 2023-09-29 RX ORDER — OXYCODONE HYDROCHLORIDE 5 MG/1
5 TABLET ORAL EVERY 6 HOURS PRN
Qty: 20 TABLET | Refills: 0 | Status: SHIPPED | OUTPATIENT
Start: 2023-09-29

## 2023-09-29 NOTE — PROGRESS NOTES
Follow-up - Radiation Oncology   Aga Novoa 1955 77 y.o. male [id number]      History of Present Illness   Cancer Staging   Carcinoma of right lung [facility])  Staging form: Lung, AJCC 8th Edition  - Clinical: Stage JAY (cT3, cN1, cM1b) - Signed by Rachel Young MD on 4/13/2023  - Pathologic stage from 9/1/2023: Stage JAY (pT2b, pN2, cM1b) - Signed by Mahad Lange PA-C on 9/20/2023  Histopathologic type: Adenocarcinoma, NOS  Stage prefix: Initial diagnosis  Histologic grade (G): G3  Histologic grading system: 4 grade system    Mr. Aga Novoa is a 77year old man with Stage JAY ([id number]) lung adenocarcinoma s/p resection of a right frontal metastasis. On 4/28/23 he completed a course of postoperative SRT to a dose of 3000cGy in 5 fractions. He was last seen in clinic on 7/5/23, at that time with plan for completion of neoadjuvant chemoimmunotherapy followed by surgical management of his primary tumor within the chest. He returns today at the request of Dr. Patti Lang for consideration of additional RT. Interval History:  Since his last visit, the patient has undergone repeat PET/CT (7/18/2023) which demonstrated interval resolution of metabolic activity in the right hilar nodes with slight decrease in the size of the right upper lobe mass and with no evidence of distant metastatic disease. The patient was seen by Dr. Patti Lang and ultimately underwent robot Nicholette Shaker converted right thoracotomy with right upper lobe lobectomy and extensive mediastinal lymph node dissection (9/1/2023). Pathology demonstrated a 4.4 cm invasive poorly differentiated adenocarcinoma with metastatic carcinoma present in 5/22 lymph nodes. These included 3 peribronchial LNs, 1 level 11R LN and 1 level 2R LN. SARAHY was noted in one of the peribronchial LNs. Margins were negative. Currently the patient is doing well overall.   He is recovered nicely from surgery with expected post incisional tenderness and some increase in SOB with exertion. He was seen by medical oncology today with plan for possible consolidative immunotherapy pending our discussion. Historical Information   Oncology History   Metastatic adenocarcinoma ([address])   2023 Initial Diagnosis    Metastatic adenocarcinoma ([address])     3/23/2023 Biopsy    Brain, right frontal mass (biopsy):  - Metastatic non-small cell carcinoma     Comment:  - Tumor cells stain diffusely for CAM5.2, CKAE1/3, CK7, TTF1 and NapsinA with absent CK20, p63, CK5/6, p40 expression. This immunopanel favors a metastatic lung adenocarcinoma.        5/18/2023 -  Chemotherapy    cyanocobalamin, 1,000 mcg, Intramuscular, Once, 3 of 3 cycles  Administration: 1,000 mcg (6/29/2023), 1,000 mcg (5/18/2023), 1,000 mcg (7/20/2023)  alteplase (CATHFLO), 2 mg, Intracatheter, Every 1 Minute as needed, 4 of 4 cycles  pegfilgrastim (Rafa Maiers), 6 mg, Subcutaneous, Once, 3 of 3 cycles  Administration: 6 mg (6/8/2023), 6 mg (6/29/2023), 6 mg (7/20/2023)  fosaprepitant (EMEND) IVPB, 150 mg, Intravenous, Once, 4 of 4 cycles  Administration: 150 mg (5/18/2023), 150 mg (6/29/2023), 150 mg (7/20/2023), 150 mg (6/8/2023)  nivolumab (OPDIVO) IVPB, 360 mg (150 % of original dose 240 mg), Intravenous, Once, 4 of 4 cycles  Dose modification: 360 mg (original dose 240 mg, Cycle 1, Reason: Dose modified as per discussion with consulting physician)  Administration: 360 mg (5/18/2023), 360 mg (6/8/2023), 360 mg (6/29/2023), 360 mg (7/20/2023)  CARBOplatin (PARAPLATIN) IVPB (GOG AUC DOSING), 642.5 mg, Intravenous, Once, 4 of 4 cycles  Administration: 642.5 mg (5/18/2023), 642.5 mg (6/29/2023), 566.5 mg (7/20/2023), 650 mg (6/8/2023)  pemetrexed (ALIMTA) chemo infusion, 980 mg, Intravenous, Once, 4 of 4 cycles  Administration: 1,000 mg (5/18/2023), 1,000 mg (6/29/2023), 1,000 mg (7/20/2023), 1,000 mg (6/8/2023)     Carcinoma of right lung ([address])   4/13/2023 Initial Diagnosis    Carcinoma of right lung ([address])     4/13/2023 - Cancer Staged    Staging form: Lung, AJCC 8th Edition  - Clinical: Stage JAY (cT3, cN1, cM1b) - Signed by Yajaira Owen MD on 4/13/2023 4/19/2023 - 4/28/2023 Radiation    Plan ID Energy Fractions Dose per Fraction (cGy) Dose Correction (cGy) Total Dose Delivered (cGy) Elapsed Days   SRT R Frontal 6X-FFF 5 / 5 600 0 3,000 9         5/18/2023 -  Chemotherapy    cyanocobalamin, 1,000 mcg, Intramuscular, Once, 3 of 3 cycles  Administration: 1,000 mcg (6/29/2023), 1,000 mcg (5/18/2023), 1,000 mcg (7/20/2023)  alteplase (CATHFLO), 2 mg, Intracatheter, Every 1 Minute as needed, 4 of 4 cycles  pegfilgrastim (Ingris Justice), 6 mg, Subcutaneous, Once, 3 of 3 cycles  Administration: 6 mg (6/8/2023), 6 mg (6/29/2023), 6 mg (7/20/2023)  fosaprepitant (EMEND) IVPB, 150 mg, Intravenous, Once, 4 of 4 cycles  Administration: 150 mg (5/18/2023), 150 mg (6/29/2023), 150 mg (7/20/2023), 150 mg (6/8/2023)  nivolumab (OPDIVO) IVPB, 360 mg (150 % of original dose 240 mg), Intravenous, Once, 4 of 4 cycles  Dose modification: 360 mg (original dose 240 mg, Cycle 1, Reason: Dose modified as per discussion with consulting physician)  Administration: 360 mg (5/18/2023), 360 mg (6/8/2023), 360 mg (6/29/2023), 360 mg (7/20/2023)  CARBOplatin (PARAPLATIN) IVPB (GOG AUC DOSING), 642.5 mg, Intravenous, Once, 4 of 4 cycles  Administration: 642.5 mg (5/18/2023), 642.5 mg (6/29/2023), 566.5 mg (7/20/2023), 650 mg (6/8/2023)  pemetrexed (ALIMTA) chemo infusion, 980 mg, Intravenous, Once, 4 of 4 cycles  Administration: 1,000 mg (5/18/2023), 1,000 mg (6/29/2023), 1,000 mg (7/20/2023), 1,000 mg (6/8/2023)     9/1/2023 -  Cancer Staged    Staging form: Lung, AJCC 8th Edition  - Pathologic stage from 9/1/2023: Stage JAY (pT2b, pN2, cM1b) - Signed by Boston Jordan PA-C on 9/20/2023  Histopathologic type:  Adenocarcinoma, NOS  Stage prefix: Initial diagnosis  Histologic grade (G): G3  Histologic grading system: 4 grade system       9/1/2023 Surgery    Right robotic converted to open upper lobectomy      9/1/2023 Biopsy    A. Lung, right upper lobe:     - Invasive poorly differentiated solid adenocarcinoma of the lung, 4.4 cm.     - Tumor invades into, but not through, the visceral pleura (PL1), confirmed by special VVG stains. - Lymphatic channel invasion by tumor identified. - Metastatic carcinoma present in three of thirteen lymph nodes (3/13). -- Rare fibrotic granulomas present. - All margins are negative for tumor.     - Emphysematous changes. B.  Lymph node, level 8:     - Negative for malignancy (0/1). C.  Lymph node, level 7, #1:     - Negative for malignancy (0/1). D.  Lymph node, level 4R:     - Negative for malignancy (0/1). E.  Lymph node, level 4R, #2:     - Fibrovascular adipose tissue; negative for malignancy.     - No lymphoid tissue identified. F.  Lymph node, level 2R, #1:     - Metastatic carcinoma present in one of three lymph nodes (1/3). G.  Lymph node, level 2R, #2:     - Negative for malignancy (0/1). H.  Lymph node, level 11 posterior:     - Single lymph node positive for metastatic carcinoma (1/1). I.  Lymph node, portion of level 12 on artery:     - Negative for malignancy (0/1). - Non-caseating granulomatous inflammation present. -- Special stains for acid fast bacilli and fungal organisms are negative.             Past Medical History:   Diagnosis Date   • Cancer ([address] 2001    Receint lung and brain lesions and prostate cancer in 2001   • Hypertension    • Prostate cancer ([address]) 2001   • Rectal bleeding    • Stroke [facility])     2011       Past Surgical History:   Procedure Laterality Date   • COLONOSCOPY     • CRANIOTOMY Right 3/23/2023    Procedure: Right frontal CRANIOTOMY IMAGE-GUIDED FOR TUMOR;  Surgeon: Carmen Plascencia MD;  [facility];  Service: Neurosurgery   • IR PORT PLACEMENT  4/27/2023   •  [address] INCL FLUOR GDNCE DX W/CELL WASHG [facility] N/A 9/1/2023 Procedure: BRONCHOSCOPY FLEXIBLE;  Surgeon: Jeremy De Anda MD;  Location[facility];  Service: Thoracic   • [place] CYSTOURETHROSCOPY N/A 3/23/2023    Procedure: Daniela Huang;  Surgeon: Evonnie Ahumada, MD;  [facility];  Service: Urology   • [place] THORACOSCOPY W/LOBECTOMY SINGLE LOBE Right 2023    Procedure: robotic assisted converted to open right upper lobectomy, lymph node dissection;  Surgeon: Jeremy De Anda MD;  [facility];  Service: Thoracic   • PROSTATECTOMY     • THORACOTOMY Right 2023    Procedure: right thoracotomy with Cryo-Ablation;  Surgeon: Jeremy De Anda MD;  [facility];  Service: Thoracic   • TONSILLECTOMY         Social History   Social History     Substance and Sexual Activity   Alcohol Use Yes   • Alcohol/week: 6.0 standard drinks of alcohol   • Types: 6 Cans of beer per week    Comment: socially  last drink 3/23     Social History     Substance and Sexual Activity   Drug Use Yes   • Types: Marijuana    Comment: gummies foro nausea with chemo     Social History     Tobacco Use   Smoking Status Former   • Packs/day: 1.00   • Years: 15.00   • Total pack years: 15.00   • Types: Cigarettes   • Quit date:    • Years since quittin.7   • Passive exposure: Never   Smokeless Tobacco Never   Tobacco Comments    i quit when i was 28. not sure of exact dates         Meds/Allergies     Current Outpatient Medications:   •  amLODIPine (NORVASC) 10 mg tablet, Take 1 tablet (10 mg total) by mouth daily, Disp: 90 tablet, Rfl: 0  •  atorvastatin (LIPITOR) 40 mg tablet, Take 1 tablet (40 mg total) by mouth daily after dinner, Disp: 30 tablet, Rfl: 0  •  docusate sodium (COLACE) 100 mg capsule, Take 1 capsule (100 mg total) by mouth daily Do not start before 2023., Disp: 20 capsule, Rfl: 0  •  folic acid (KP Folic Acid) 1 mg tablet, Take 1 tablet (1 mg total) by mouth daily, Disp: 30 tablet, Rfl: 11  •  losartan (COZAAR) 50 mg tablet, Take 1 tablet (50 mg total) by mouth daily, Disp: 90 tablet, Rfl: 0  •  Magnesium Oxide, Elemental, 400 MG TABS, Take 400 mg by mouth in the morning, Disp: 30 tablet, Rfl: 6  •  ondansetron (ZOFRAN) 8 mg tablet, Take 1 tablet (8 mg total) by mouth every 8 (eight) hours as needed for nausea or vomiting, Disp: 20 tablet, Rfl: 3  •  gabapentin (Neurontin) 300 mg capsule, Take 1 capsule (300 mg total) by mouth 3 (three) times a day for 14 days, Disp: 42 capsule, Rfl: 0  •  methocarbamol (ROBAXIN) 500 mg tablet, Take 1 tablet (500 mg total) by mouth 4 (four) times a day as needed for muscle spasms for up to 5 days, Disp: 20 tablet, Rfl: 0  No Known Allergies    Review of Systems   Constitutional: Positive for activity change and fatigue. Eyes:        Wears glasses   Respiratory: Positive for cough (Occasional, dry), shortness of breath (On exertion) and wheezing (Intermittent). Gastrointestinal: Negative. Endocrine: Negative. Genitourinary: Negative. Musculoskeletal:        Pain to surgical site, right chest, sore   Skin: Negative. Allergic/Immunologic: Negative. Neurological: Negative. Hematological: Negative. Psychiatric/Behavioral: Negative.            OBJECTIVE:   /74 (BP Location: Left arm, Patient Position: Sitting, Cuff Size: Standard)   Pulse 70   Temp 98.3 °F (36.8 °C) (Temporal)   Resp 18   Ht 5' 9" (1.753 m)   Wt 86.7 kg (191 lb 3.2 oz)   SpO2 95%   BMI 28.24 kg/m²   Pain Assessment:  4  ECOG/Zubrod/WHO: 1 - Symptomatic but completely ambulatory    Physical Exam   Well-appearing. NAD. No increased work of breathing. Extremities warm well perfused. Well-healed thoracotomy incision on the right posterolateral chest wall.     RESULTS    Lab Results:   Recent Results (from the past 672 hour(s))   Platelet count    Collection Time: 09/01/23  6:26 PM   Result Value Ref Range    Platelets 783 313 - 476 Thousands/uL    MPV 9.1 8.9 - 12.7 fL   Basic metabolic panel    Collection Time: 09/02/23  5:14 AM   Result Value Ref Range    Sodium 138 135 - 147 mmol/L    Potassium 4.5 3.5 - 5.3 mmol/L    Chloride 104 96 - 108 mmol/L    CO2 27 21 - 32 mmol/L    ANION GAP 7 mmol/L    BUN 16 5 - 25 mg/dL    Creatinine 0.86 0.60 - 1.30 mg/dL    Glucose 132 65 - 140 mg/dL    Calcium 7.8 (L) 8.4 - 10.2 mg/dL    eGFR 87 ml/min/1.73sq m   Magnesium    Collection Time: 09/02/23  5:14 AM   Result Value Ref Range    Magnesium 1.9 1.9 - 2.7 mg/dL   Phosphorus    Collection Time: 09/02/23  5:14 AM   Result Value Ref Range    Phosphorus 4.1 2.3 - 4.1 mg/dL   CBC    Collection Time: 09/02/23  5:14 AM   Result Value Ref Range    WBC 12.49 (H) 4.31 - 10.16 Thousand/uL    RBC 3.86 (L) 3.88 - 5.62 Million/uL    Hemoglobin 11.6 (L) 12.0 - 17.0 g/dL    Hematocrit 36.8 36.5 - 49.3 %    MCV 95 82 - 98 fL    MCH 30.1 26.8 - 34.3 pg    MCHC 31.5 31.4 - 37.4 g/dL    RDW 14.6 11.6 - 15.1 %    Platelets 714 392 - 400 Thousands/uL    MPV 8.9 8.9 - 12.7 fL   Prepare Plasma: 2 Units    Collection Time: 09/02/23  7:18 AM   Result Value Ref Range    Unit Product Code X3961H90     Unit Number [id number]     Unit ABO A     Unit RH POS     Unit Dispense Status Return to [facility]     Unit Product Volume 280 ml    Unit Product Code B1306E73     Unit Number [id number]     Unit ABO A     Unit RH POS     Unit Dispense Status Return to [facility]     Unit Product Volume 280 ml   Prepare Leukoreduced RBC: 2 Units    Collection Time: 09/02/23  7:48 AM   Result Value Ref Range    Unit Product Code D5541W27     Unit Number [id number]     Unit ABO O     Unit RH POS     Crossmatch Compatible     Unit Dispense Status Return to [facility]     Unit Product Volume 350 mL    Unit Product Code J5914Q60     Unit Number [id number]     Unit ABO O     Unit RH POS     Crossmatch Compatible     Unit Dispense Status Return to Inv     Unit Product Volume 350 mL   Basic metabolic panel    Collection Time: 09/04/23  4:23 AM   Result Value Ref Range    Sodium 138 135 - 147 mmol/L    Potassium 4.1 3.5 - 5.3 mmol/L    Chloride 103 96 - 108 mmol/L    CO2 28 21 - 32 mmol/L    ANION GAP 7 mmol/L    BUN 13 5 - 25 mg/dL    Creatinine 0.79 0.60 - 1.30 mg/dL    Glucose 125 65 - 140 mg/dL    Calcium 8.6 8.4 - 10.2 mg/dL    eGFR 90 ml/min/1.73sq m   CBC and differential    Collection Time: 09/04/23  4:23 AM   Result Value Ref Range    WBC 8.24 4.31 - 10.16 Thousand/uL    RBC 3.84 (L) 3.88 - 5.62 Million/uL    Hemoglobin 11.6 (L) 12.0 - 17.0 g/dL    Hematocrit 36.5 36.5 - 49.3 %    MCV 95 82 - 98 fL    MCH 30.2 26.8 - 34.3 pg    MCHC 31.8 31.4 - 37.4 g/dL    RDW 14.5 11.6 - 15.1 %    MPV 9.0 8.9 - 12.7 fL    Platelets 954 200 - 834 Thousands/uL    nRBC 0 /100 WBCs    Neutrophils Relative 70 43 - 75 %    Immat GRANS % 1 0 - 2 %    Lymphocytes Relative 17 14 - 44 %    Monocytes Relative 8 4 - 12 %    Eosinophils Relative 3 0 - 6 %    Basophils Relative 1 0 - 1 %    Neutrophils Absolute 5.76 1.85 - 7.62 Thousands/µL    Immature Grans Absolute 0.09 0.00 - 0.20 Thousand/uL    Lymphocytes Absolute 1.41 0.60 - 4.47 Thousands/µL    Monocytes Absolute 0.65 0.17 - 1.22 Thousand/µL    Eosinophils Absolute 0.26 0.00 - 0.61 Thousand/µL    Basophils Absolute 0.07 0.00 - 0.10 Thousands/µL   Magnesium    Collection Time: 09/04/23  4:23 AM   Result Value Ref Range    Magnesium 1.9 1.9 - 2.7 mg/dL       Imaging Studies:XR chest pa & lateral    Result Date: 9/28/2023  Narrative: CHEST INDICATION:   R91.1: Solitary pulmonary nodule. Per prior reports patient has had a right upper lobectomy. COMPARISON: 9/4/2023 EXAM PERFORMED/VIEWS:  XR CHEST PA & LATERAL FINDINGS: Stable right chest port. Cardiomediastinal silhouette appears unremarkable. The lungs are clear. No pneumothorax. Blunting of the right costophrenic angle may represent postoperative pleural thickening or a small pleural effusion. Osseous structures appear within normal limits for patient age.      Impression: Right pleural thickening versus small right pleural effusion. Workstation performed: [id number]     XR chest pa & lateral    Result Date: 9/5/2023  Narrative: CHEST INDICATION:   Post-pull CXR. Per my review of the medical record, right upper lobectomy 9/1/2023. COMPARISON: CXR 9/1/2023, PET/CT 7/18/2023. EXAM PERFORMED/VIEWS:  XR CHEST PA & LATERAL. DUAL ENERGY SUBTRACTION. FINDINGS: Right port at cavoatrial junction. Cardiomediastinal silhouette normal. Surgical changes from right upper lobectomy with skin staples in the right chest wall. Mild pleural thickening versus small loculated effusion along the right lateral chest wall. Mild left base atelectasis. No pneumothorax. Upper abdomen normal.  Bones normal for age. Impression: Right chest tube removal with no pneumothorax. Mild pleural thickening versus small loculated effusion along the right chest wall. Mild left base atelectasis. Workstation performed: [id number]     XR chest portable    Result Date: 9/2/2023  Narrative: CHEST INDICATION:   s/p lobectomy. COMPARISON: 9/1/2023, CTA chest 6/27/2023, PET/CT 7/18/2023 EXAM PERFORMED/VIEWS:  XR CHEST PORTABLE Images: 2 FINDINGS:  Monitoring leads and clips project over the chest. Right chest Port-A-Cath, tip at the SVC. The patient is slightly rotated to the left. Lobular fullness of the right paramediastinal stripe appears stable, nonspecific. Cardiac silhouette is normal. Clips seen at the right hilum. Small right apical pneumothorax with ipsilateral chest tube tip directed toward the apex. Small mount of subcutaneous emphysema inferolateral right chest wall with chest wall staples. Subsegmental atelectasis left base. Degenerative changes of the spine. Impression: Small right apical pneumothorax with ipsilateral chest tube. Stable lobular fullness right mediastinal border, nonspecific. Left base subsegmental atelectasis. The study was marked in [facility] for immediate notification.  Workstation performed: [id number]     XR chest portable    Result Date: 9/1/2023  Narrative: CHEST INDICATION:   post chest tube in OR. COMPARISON: 3/19/2023 EXAM PERFORMED/VIEWS:  XR CHEST PORTABLE AP semierect Images: 2 FINDINGS: Right-sided Port-A-Cath noted. Catheter tip at the cavoatrial junction. Right chest tube noted, the tip in the apex. Staples seen along the lateral right chest border. Mass noted in the right mediastinum along the ascending aortic border in the right heart border. Patchy opacity in the left mid and lower lung. The previously noted right upper lobe mass is no longer apparent. No evidence for pneumothorax or pleural effusion on this study. No acute osseous abnormalities. Impression: Large right paracardiac and paramediastinal mass. Previously noted right upper lobe mass is no longer apparent. Left mid and lower lung opacity likely due to pneumonia. The study was marked in [facility] for immediate notification. Workstation performed: [id number]           Assessment/Plan:  No orders of the defined types were placed in this encounter. We reviewed the patient's recent clinical history and pathology report in detail, which is notable primarily for the presence of a single resected N2 LN. We reviewed that while PORT has historically been offered in the setting of N2 LN involvement at the time of resection, the results of multiple RCTs have called this practice into question. Specifically, we reviewed the results of the LungART trial Alize Mcintyre et al, Lancet Oncol 2022) and the PORT-C trial Jimmy Cobb et al, MAGGIE Onc 2021), which demonstrated no benefit with the addition of PORT for resected N2 disease. While there may still be some indication for PORT in patients with multiple resected bulky N2 LNs, N2 nodes with SARAHY+, or inadequate tri dissection (i.e. high proportion of LNs involved), in this case there was only a single resected N2 LN without SARAHY and thus I would not recommend RT.      The patient should return to medical oncology for consideration of consolidative immunotherapy. I will plan to see him back as planned for follow-up of his treated brain metastasis. I will otherwise remain available should additional questions or concerns arise. Nia Capone MD  9/94/8258,89:50 PM    Total time spent reviewing EMR, seeing patient in consultation, documenting visit, placing treatment orders, and communicating with other medical providers: 44 minutes       Portions of the record may have been created with voice recognition software.  Occasional wrong word or "sound a like" substitutions may have occurred due to the inherent limitations of voice recognition software.  Read the chart carefully and recognize, using context, where substitutions have occurred.

## 2023-09-29 NOTE — TELEPHONE ENCOUNTER
Patient Call    Who are you speaking with? Spouse  & Patient   If it is not the patient, are they listed on an active communication consent form? NA   What is the reason for this call? Patient needs new orders for medications GABATENTIN 300MG AND OXYCODONE  5MG    Does this require a call back? YES   If a call back is required, please list best call back number [phone]   If a call back is required, advise that a message will be forwarded to their care team and someone will return their call as soon as possible. Did you relay this information to the patient?  Yes-please call when order is sent to pharmacy

## 2023-10-04 ENCOUNTER — TELEPHONE (OUTPATIENT)
Dept: HEMATOLOGY ONCOLOGY | Facility: CLINIC | Age: 68
End: 2023-10-04

## 2023-10-04 DIAGNOSIS — C34.91 CARCINOMA OF RIGHT LUNG (HCC): ICD-10-CM

## 2023-10-04 DIAGNOSIS — D70.1 CHEMOTHERAPY-INDUCED NEUTROPENIA: ICD-10-CM

## 2023-10-04 DIAGNOSIS — C79.9 METASTATIC ADENOCARCINOMA (HCC): Primary | ICD-10-CM

## 2023-10-04 DIAGNOSIS — T45.1X5A CHEMOTHERAPY-INDUCED NEUTROPENIA: ICD-10-CM

## 2023-10-04 NOTE — TELEPHONE ENCOUNTER
Pt needs to be scheduled for Nivolumab once a month at [address] [place] infusion Denise Ricardo has been obtained.  Pt will need to have his labs done a day or two before treatment

## 2023-10-05 ENCOUNTER — HOSPITAL ENCOUNTER (OUTPATIENT)
Dept: MRI IMAGING | Facility: HOSPITAL | Age: 68
End: 2023-10-05
Payer: COMMERCIAL

## 2023-10-05 DIAGNOSIS — T45.1X5A CHEMOTHERAPY-INDUCED NEUTROPENIA: ICD-10-CM

## 2023-10-05 DIAGNOSIS — D70.1 CHEMOTHERAPY-INDUCED NEUTROPENIA: ICD-10-CM

## 2023-10-05 DIAGNOSIS — C79.9 METASTATIC ADENOCARCINOMA (HCC): Primary | ICD-10-CM

## 2023-10-05 DIAGNOSIS — C79.31 METASTASIS TO BRAIN (HCC): ICD-10-CM

## 2023-10-05 DIAGNOSIS — C34.91 CARCINOMA OF RIGHT LUNG (HCC): ICD-10-CM

## 2023-10-05 PROCEDURE — 70553 MRI BRAIN STEM W/O & W/DYE: CPT

## 2023-10-05 PROCEDURE — A9585 GADOBUTROL INJECTION: HCPCS | Performed by: STUDENT IN AN ORGANIZED HEALTH CARE EDUCATION/TRAINING PROGRAM

## 2023-10-05 RX ORDER — SODIUM CHLORIDE 9 MG/ML
20 INJECTION, SOLUTION INTRAVENOUS ONCE
OUTPATIENT
Start: 2023-10-11

## 2023-10-05 RX ORDER — GADOBUTROL 604.72 MG/ML
10 INJECTION INTRAVENOUS
Status: COMPLETED | OUTPATIENT
Start: 2023-10-05 | End: 2023-10-05

## 2023-10-05 RX ADMIN — GADOBUTROL 10 ML: 604.72 INJECTION INTRAVENOUS at 11:01

## 2023-10-10 ENCOUNTER — HOSPITAL ENCOUNTER (OUTPATIENT)
Dept: INFUSION CENTER | Facility: HOSPITAL | Age: 68
Discharge: HOME | End: 2023-10-10
Payer: COMMERCIAL

## 2023-10-10 DIAGNOSIS — T45.1X5A CHEMOTHERAPY-INDUCED NEUTROPENIA: ICD-10-CM

## 2023-10-10 DIAGNOSIS — D70.1 CHEMOTHERAPY-INDUCED NEUTROPENIA: ICD-10-CM

## 2023-10-10 DIAGNOSIS — Z45.2 ENCOUNTER FOR CENTRAL LINE CARE: ICD-10-CM

## 2023-10-10 DIAGNOSIS — C79.9 METASTATIC ADENOCARCINOMA (HCC): Primary | ICD-10-CM

## 2023-10-10 DIAGNOSIS — C34.91 CARCINOMA OF RIGHT LUNG (HCC): ICD-10-CM

## 2023-10-10 LAB
ALBUMIN SERPL BCP-MCNC: 4.3 G/DL (ref 3.5–5)
ALP SERPL-CCNC: 70 U/L (ref 34–104)
ALT SERPL W P-5'-P-CCNC: 12 U/L (ref 7–52)
ANION GAP SERPL CALCULATED.3IONS-SCNC: 9 MMOL/L
AST SERPL W P-5'-P-CCNC: 18 U/L (ref 13–39)
BASOPHILS # BLD AUTO: 0.06 THOUSANDS/ÂΜL (ref 0–0.1)
BASOPHILS NFR BLD AUTO: 1 % (ref 0–1)
BILIRUB SERPL-MCNC: 0.46 MG/DL (ref 0.2–1)
BUN SERPL-MCNC: 11 MG/DL (ref 5–25)
CALCIUM SERPL-MCNC: 9 MG/DL (ref 8.4–10.2)
CHLORIDE SERPL-SCNC: 104 MMOL/L (ref 96–108)
CO2 SERPL-SCNC: 29 MMOL/L (ref 21–32)
CREAT SERPL-MCNC: 0.82 MG/DL (ref 0.6–1.3)
EOSINOPHIL # BLD AUTO: 0.33 THOUSAND/ÂΜL (ref 0–0.61)
EOSINOPHIL NFR BLD AUTO: 6 % (ref 0–6)
ERYTHROCYTE [DISTWIDTH] IN BLOOD BY AUTOMATED COUNT: 11.9 % (ref 11.6–15.1)
GFR SERPL CREATININE-BSD FRML MDRD: 89 ML/MIN/1.73SQ M
GLUCOSE SERPL-MCNC: 132 MG/DL (ref 65–140)
HCT VFR BLD AUTO: 39.9 % (ref 36.5–49.3)
HGB BLD-MCNC: 13.2 G/DL (ref 12–17)
IMM GRANULOCYTES # BLD AUTO: 0.05 THOUSAND/UL (ref 0–0.2)
IMM GRANULOCYTES NFR BLD AUTO: 1 % (ref 0–2)
LYMPHOCYTES # BLD AUTO: 1.45 THOUSANDS/ÂΜL (ref 0.6–4.47)
LYMPHOCYTES NFR BLD AUTO: 25 % (ref 14–44)
MCH RBC QN AUTO: 30.1 PG (ref 26.8–34.3)
MCHC RBC AUTO-ENTMCNC: 33.1 G/DL (ref 31.4–37.4)
MCV RBC AUTO: 91 FL (ref 82–98)
MONOCYTES # BLD AUTO: 0.37 THOUSAND/ÂΜL (ref 0.17–1.22)
MONOCYTES NFR BLD AUTO: 6 % (ref 4–12)
NEUTROPHILS # BLD AUTO: 3.55 THOUSANDS/ÂΜL (ref 1.85–7.62)
NEUTS SEG NFR BLD AUTO: 61 % (ref 43–75)
NRBC BLD AUTO-RTO: 0 /100 WBCS
PLATELET # BLD AUTO: 194 THOUSANDS/UL (ref 149–390)
PMV BLD AUTO: 9.9 FL (ref 8.9–12.7)
POTASSIUM SERPL-SCNC: 3.6 MMOL/L (ref 3.5–5.3)
PROT SERPL-MCNC: 7.1 G/DL (ref 6.4–8.4)
RBC # BLD AUTO: 4.38 MILLION/UL (ref 3.88–5.62)
SODIUM SERPL-SCNC: 142 MMOL/L (ref 135–147)
T3FREE SERPL-MCNC: 3.42 PG/ML (ref 2.5–3.9)
TSH SERPL DL<=0.05 MIU/L-ACNC: 2.22 UIU/ML (ref 0.45–4.5)
WBC # BLD AUTO: 5.81 THOUSAND/UL (ref 4.31–10.16)

## 2023-10-10 PROCEDURE — 85025 COMPLETE CBC W/AUTO DIFF WBC: CPT | Performed by: INTERNAL MEDICINE

## 2023-10-10 PROCEDURE — 80053 COMPREHEN METABOLIC PANEL: CPT | Performed by: INTERNAL MEDICINE

## 2023-10-10 PROCEDURE — 84481 FREE ASSAY (FT-3): CPT | Performed by: INTERNAL MEDICINE

## 2023-10-10 PROCEDURE — 84443 ASSAY THYROID STIM HORMONE: CPT | Performed by: INTERNAL MEDICINE

## 2023-10-10 NOTE — PROGRESS NOTES
Patient port accessed, blood return noted. Labs obtained per order. Port flushed, and then deaccessed. Patient ambulated off unit.

## 2023-10-13 ENCOUNTER — HOSPITAL ENCOUNTER (OUTPATIENT)
Dept: INFUSION CENTER | Facility: HOSPITAL | Age: 68
End: 2023-10-13
Attending: INTERNAL MEDICINE
Payer: COMMERCIAL

## 2023-10-13 VITALS
DIASTOLIC BLOOD PRESSURE: 74 MMHG | BODY MASS INDEX: 27.69 KG/M2 | HEIGHT: 69 IN | TEMPERATURE: 96.8 F | SYSTOLIC BLOOD PRESSURE: 135 MMHG | RESPIRATION RATE: 18 BRPM | HEART RATE: 72 BPM | OXYGEN SATURATION: 98 % | WEIGHT: 186.95 LBS

## 2023-10-13 DIAGNOSIS — C34.91 CARCINOMA OF RIGHT LUNG (HCC): Primary | ICD-10-CM

## 2023-10-13 DIAGNOSIS — C79.9 METASTATIC ADENOCARCINOMA (HCC): ICD-10-CM

## 2023-10-13 DIAGNOSIS — D70.1 CHEMOTHERAPY-INDUCED NEUTROPENIA: ICD-10-CM

## 2023-10-13 DIAGNOSIS — T45.1X5A CHEMOTHERAPY-INDUCED NEUTROPENIA: ICD-10-CM

## 2023-10-13 RX ORDER — SODIUM CHLORIDE 9 MG/ML
20 INJECTION, SOLUTION INTRAVENOUS ONCE
Status: COMPLETED | OUTPATIENT
Start: 2023-10-13 | End: 2023-10-13

## 2023-10-13 RX ADMIN — SODIUM CHLORIDE 480 MG: 9 INJECTION, SOLUTION INTRAVENOUS at 10:15

## 2023-10-13 RX ADMIN — SODIUM CHLORIDE 20 ML/HR: 0.9 INJECTION, SOLUTION INTRAVENOUS at 09:25

## 2023-10-17 DIAGNOSIS — E78.00 HYPERCHOLESTEROLEMIA: ICD-10-CM

## 2023-10-17 RX ORDER — ATORVASTATIN CALCIUM 40 MG/1
40 TABLET, FILM COATED ORAL
Qty: 30 TABLET | Refills: 0 | Status: SHIPPED | OUTPATIENT
Start: 2023-10-17 | End: 2023-10-20 | Stop reason: SDUPTHER

## 2023-10-18 ENCOUNTER — OFFICE VISIT (OUTPATIENT)
Dept: CARDIAC SURGERY | Facility: CLINIC | Age: 68
End: 2023-10-18

## 2023-10-18 ENCOUNTER — RADIATION ONCOLOGY FOLLOW-UP (OUTPATIENT)
Dept: RADIATION ONCOLOGY | Facility: HOSPITAL | Age: 68
End: 2023-10-18
Attending: STUDENT IN AN ORGANIZED HEALTH CARE EDUCATION/TRAINING PROGRAM
Payer: COMMERCIAL

## 2023-10-18 VITALS
HEIGHT: 69 IN | RESPIRATION RATE: 14 BRPM | TEMPERATURE: 98.7 F | OXYGEN SATURATION: 94 % | BODY MASS INDEX: 27.27 KG/M2 | SYSTOLIC BLOOD PRESSURE: 139 MMHG | WEIGHT: 184.08 LBS | HEART RATE: 82 BPM | DIASTOLIC BLOOD PRESSURE: 82 MMHG

## 2023-10-18 VITALS
HEIGHT: 69 IN | TEMPERATURE: 98.3 F | BODY MASS INDEX: 27.43 KG/M2 | OXYGEN SATURATION: 95 % | HEART RATE: 74 BPM | RESPIRATION RATE: 18 BRPM | SYSTOLIC BLOOD PRESSURE: 126 MMHG | DIASTOLIC BLOOD PRESSURE: 82 MMHG | WEIGHT: 185.2 LBS

## 2023-10-18 DIAGNOSIS — C34.91 CARCINOMA OF RIGHT LUNG (HCC): Primary | ICD-10-CM

## 2023-10-18 DIAGNOSIS — G89.12 ACUTE POST-THORACOTOMY PAIN: ICD-10-CM

## 2023-10-18 DIAGNOSIS — C79.31 METASTASIS TO BRAIN (HCC): Primary | ICD-10-CM

## 2023-10-18 PROCEDURE — 99211 OFF/OP EST MAY X REQ PHY/QHP: CPT | Performed by: STUDENT IN AN ORGANIZED HEALTH CARE EDUCATION/TRAINING PROGRAM

## 2023-10-18 PROCEDURE — 99213 OFFICE O/P EST LOW 20 MIN: CPT | Performed by: STUDENT IN AN ORGANIZED HEALTH CARE EDUCATION/TRAINING PROGRAM

## 2023-10-18 PROCEDURE — G0463 HOSPITAL OUTPT CLINIC VISIT: HCPCS | Performed by: STUDENT IN AN ORGANIZED HEALTH CARE EDUCATION/TRAINING PROGRAM

## 2023-10-18 PROCEDURE — 99024 POSTOP FOLLOW-UP VISIT: CPT | Performed by: THORACIC SURGERY (CARDIOTHORACIC VASCULAR SURGERY)

## 2023-10-18 RX ORDER — OXYCODONE HYDROCHLORIDE 5 MG/1
5 TABLET ORAL EVERY 6 HOURS PRN
Qty: 20 TABLET | Refills: 0 | Status: SHIPPED | OUTPATIENT
Start: 2023-10-18 | End: 2023-11-17

## 2023-10-18 RX ORDER — GABAPENTIN 300 MG/1
300 CAPSULE ORAL 3 TIMES DAILY
Qty: 84 CAPSULE | Refills: 0 | Status: SHIPPED | OUTPATIENT
Start: 2023-10-18 | End: 2023-11-15

## 2023-10-18 NOTE — PROGRESS NOTES
Jose Miguel Leblanc 1955 is a 77 y.o. male with Stage JAY ([id number]) lung adenocarcinoma s/p resection of a right frontal metastasis. On 4/28/23 he completed a course of postoperative SRT to a dose of 3000cGy in 5 fractions. 9/1/23 s/p right robotic converted to open right upper lobectomy. Pathology which demonstrated T2b, N2 disease, indicative of requiring adjuvant chemoradiation. He was last seen in radiation on 9/28/23. At that time, RT was not recommended. He returns today for follow up s/p MRI. 10/5/23 MRI Brain BT w wo contrast  IMPRESSION:  Posttreatment change identified within the right parafalcine frontal vertex status post resection and stereotactic radiosurgery. Slight increase in the FLAIR signal change adjacent to the resection cavity likely treatment related with no new enhancement   to suggest residual or recurrent disease. No new metastasis identified. Upcoming:  10/25/23 Hematology Oncology       Oncology History   Metastatic adenocarcinoma [facility])   2023 Initial Diagnosis    Metastatic adenocarcinoma ([address])     3/23/2023 Biopsy    Brain, right frontal mass (biopsy):  - Metastatic non-small cell carcinoma     Comment:  - Tumor cells stain diffusely for CAM5.2, CKAE1/3, CK7, TTF1 and NapsinA with absent CK20, p63, CK5/6, p40 expression. This immunopanel favors a metastatic lung adenocarcinoma.        5/18/2023 - 7/20/2023 Chemotherapy    cyanocobalamin, 1,000 mcg, Intramuscular, Once, 3 of 3 cycles  Administration: 1,000 mcg (6/29/2023), 1,000 mcg (5/18/2023), 1,000 mcg (7/20/2023)  alteplase (CATHFLO), 2 mg, Intracatheter, Every 1 Minute as needed, 4 of 4 cycles  pegfilgrastim (Claudio Lewandowsky), 6 mg, Subcutaneous, Once, 3 of 3 cycles  Administration: 6 mg (6/8/2023), 6 mg (6/29/2023), 6 mg (7/20/2023)  fosaprepitant (EMEND) IVPB, 150 mg, Intravenous, Once, 4 of 4 cycles  Administration: 150 mg (5/18/2023), 150 mg (6/29/2023), 150 mg (7/20/2023), 150 mg (6/8/2023)  nivolumab (OPDIVO) IVPB, 360 mg (150 % of original dose 240 mg), Intravenous, Once, 4 of 4 cycles  Dose modification: 360 mg (original dose 240 mg, Cycle 1, Reason: Dose modified as per discussion with consulting physician)  Administration: 360 mg (5/18/2023), 360 mg (6/8/2023), 360 mg (6/29/2023), 360 mg (7/20/2023)  CARBOplatin (PARAPLATIN) IVPB (GOG AUC DOSING), 642.5 mg, Intravenous, Once, 4 of 4 cycles  Administration: 642.5 mg (5/18/2023), 642.5 mg (6/29/2023), 566.5 mg (7/20/2023), 650 mg (6/8/2023)  pemetrexed (ALIMTA) chemo infusion, 980 mg, Intravenous, Once, 4 of 4 cycles  Administration: 1,000 mg (5/18/2023), 1,000 mg (6/29/2023), 1,000 mg (7/20/2023), 1,000 mg (6/8/2023)     10/11/2023 - 10/11/2023 Chemotherapy    alteplase (CATHFLO), 2 mg, Intracatheter, Every 1 Minute as needed, 0 of 6 cycles  nivolumab (OPDIVO) IVPB, 240 mg, Intravenous, Once, 0 of 6 cycles     10/13/2023 -  Chemotherapy    alteplase (CATHFLO), 2 mg, Intracatheter, Every 1 Minute as needed, 1 of 6 cycles  nivolumab (OPDIVO) IVPB, 480 mg, Intravenous, Once, 1 of 6 cycles     Carcinoma of right lung ([address])   4/13/2023 Initial Diagnosis    Carcinoma of right lung ([address])     4/13/2023 -  Cancer Staged    Staging form: Lung, AJCC 8th Edition  - Clinical: Stage JAY (cT3, cN1, cM1b) - Signed by Farshad East MD on 4/13/2023 4/19/2023 - 4/28/2023 Radiation    Plan ID Energy Fractions Dose per Fraction (cGy) Dose Correction (cGy) Total Dose Delivered (cGy) Elapsed Days   SRT R Frontal 6X-FFF 5 / 5 600 0 3,000 9         5/18/2023 - 7/20/2023 Chemotherapy    cyanocobalamin, 1,000 mcg, Intramuscular, Once, 3 of 3 cycles  Administration: 1,000 mcg (6/29/2023), 1,000 mcg (5/18/2023), 1,000 mcg (7/20/2023)  alteplase (CATHFLO), 2 mg, Intracatheter, Every 1 Minute as needed, 4 of 4 cycles  pegfilgrastim (NEULASTA ONPRO), 6 mg, Subcutaneous, Once, 3 of 3 cycles  Administration: 6 mg (6/8/2023), 6 mg (6/29/2023), 6 mg (7/20/2023)  fosaprepitant (EMEND) IVPB, 150 mg, Intravenous, Once, 4 of 4 cycles  Administration: 150 mg (5/18/2023), 150 mg (6/29/2023), 150 mg (7/20/2023), 150 mg (6/8/2023)  nivolumab (OPDIVO) IVPB, 360 mg (150 % of original dose 240 mg), Intravenous, Once, 4 of 4 cycles  Dose modification: 360 mg (original dose 240 mg, Cycle 1, Reason: Dose modified as per discussion with consulting physician)  Administration: 360 mg (5/18/2023), 360 mg (6/8/2023), 360 mg (6/29/2023), 360 mg (7/20/2023)  CARBOplatin (PARAPLATIN) IVPB (GOG AUC DOSING), 642.5 mg, Intravenous, Once, 4 of 4 cycles  Administration: 642.5 mg (5/18/2023), 642.5 mg (6/29/2023), 566.5 mg (7/20/2023), 650 mg (6/8/2023)  pemetrexed (ALIMTA) chemo infusion, 980 mg, Intravenous, Once, 4 of 4 cycles  Administration: 1,000 mg (5/18/2023), 1,000 mg (6/29/2023), 1,000 mg (7/20/2023), 1,000 mg (6/8/2023)     9/1/2023 -  Cancer Staged    Staging form: Lung, AJCC 8th Edition  - Pathologic stage from 9/1/2023: Stage JAY (pT2b, pN2, cM1b) - Signed by Thuy Dumas PA-C on 9/20/2023  Histopathologic type: Adenocarcinoma, NOS  Stage prefix: Initial diagnosis  Histologic grade (G): G3  Histologic grading system: 4 grade system       9/1/2023 Surgery    Right robotic converted to open upper lobectomy      9/1/2023 Biopsy    A. Lung, right upper lobe:     - Invasive poorly differentiated solid adenocarcinoma of the lung, 4.4 cm.     - Tumor invades into, but not through, the visceral pleura (PL1), confirmed by special VVG stains. - Lymphatic channel invasion by tumor identified. - Metastatic carcinoma present in three of thirteen lymph nodes (3/13). -- Rare fibrotic granulomas present. - All margins are negative for tumor.     - Emphysematous changes. B.  Lymph node, level 8:     - Negative for malignancy (0/1). C.  Lymph node, level 7, #1:     - Negative for malignancy (0/1). D.  Lymph node, level 4R:     - Negative for malignancy (0/1).      E.  Lymph node, level 4R, #2:     - Fibrovascular adipose tissue; negative for malignancy.     - No lymphoid tissue identified. F.  Lymph node, level 2R, #1:     - Metastatic carcinoma present in one of three lymph nodes (1/3). G.  Lymph node, level 2R, #2:     - Negative for malignancy (0/1). H.  Lymph node, level 11 posterior:     - Single lymph node positive for metastatic carcinoma (1/1). I.  Lymph node, portion of level 12 on artery:     - Negative for malignancy (0/1). - Non-caseating granulomatous inflammation present. -- Special stains for acid fast bacilli and fungal organisms are negative. 10/11/2023 - 10/11/2023 Chemotherapy    alteplase (CATHFLO), 2 mg, Intracatheter, Every 1 Minute as needed, 0 of 6 cycles  nivolumab (OPDIVO) IVPB, 240 mg, Intravenous, Once, 0 of 6 cycles     10/13/2023 -  Chemotherapy    alteplase (CATHFLO), 2 mg, Intracatheter, Every 1 Minute as needed, 1 of 6 cycles  nivolumab (OPDIVO) IVPB, 480 mg, Intravenous, Once, 1 of 6 cycles         Review of Systems:  Review of Systems   Constitutional:  Positive for fatigue. HENT:  Positive for hearing loss ([place], not new). Eyes: Negative. Respiratory:  Positive for shortness of breath (Mild, with exertion). Cardiovascular: Negative. Gastrointestinal: Negative. Endocrine: Negative. Genitourinary: Negative. Musculoskeletal:         Surgical incision site with some discomfort   Allergic/Immunologic: Negative. Neurological: Negative. Hematological: Negative. Psychiatric/Behavioral: Negative.          Clinical Trial: no      Health Maintenance   Topic Date Due    Hepatitis A Vaccine (1 of 2 - Risk 2-dose series) Never done    Hepatitis B Vaccine (1 of 3 - Risk 3-dose series) Never done    COVID-19 Vaccine (3 - Pfizer risk series) 05/14/2021    BMI: Followup Plan  03/21/2023    Influenza Vaccine (1) 09/01/2023    Annual Physical  09/22/2023    Fall Risk  04/18/2024    Depression Screening  10/18/2024    BMI: Adult 10/18/2024    DTaP,Tdap,and Td Vaccines (2 - Td or Tdap) 04/26/2028    Colorectal Cancer Screening  08/10/2031    Hepatitis C Screening  Completed    Pneumococcal Vaccine: 65+ Years  Completed    HIB Vaccine  Aged Out    IPV Vaccine  Aged Out    Meningococcal ACWY Vaccine  Aged Out    HPV Vaccine  Aged Out     Patient Active Problem List   Diagnosis    Negative depression screening    Overweight (BMI 25.0-29. 9)    Essential hypertension    Annual physical exam    CVA (cerebral vascular accident) ([address])    Hypercholesterolemia    Brain mass    Brain compression (HCC)    Cerebral edema (HCC)    Lung nodule    Metastatic adenocarcinoma ([address])    Carcinoma of right lung ([address])    Encounter for central line care    Chemotherapy-induced neutropenia     History of CVA (cerebrovascular accident)     Past Medical History:   Diagnosis Date    Cancer ([address]) 2001    Receint lung and brain lesions and prostate cancer in 2001    Hypertension     Prostate cancer ([address]) 2001    Rectal bleeding     Stroke [facility])     2011       Past Surgical History:   Procedure Laterality Date    COLONOSCOPY      CRANIOTOMY Right 3/23/2023    Procedure: Right frontal CRANIOTOMY IMAGE-GUIDED FOR TUMOR;  Surgeon: Janessa Dean MD;  Location: [facility];  Service: Neurosurgery    IR PORT PLACEMENT  4/27/2023     [address] INCL FLUOR GDNCE DX W/CELL WASHG SPX N/A 9/1/2023    Procedure: Theresa Roa;  Surgeon: Sherrell Hickey MD;  Location[facility];  Service: Thoracic    MA CYSTOURETHROSCOPY N/A 3/23/2023    Procedure: Jayson Garcia;  Surgeon: Lefty Sandra MD;  [facility];  Service: Urology    MA THORACOSCOPY W/LOBECTOMY SINGLE LOBE Right 9/1/2023    Procedure: robotic assisted converted to open right upper lobectomy, lymph node dissection;  Surgeon: Sherrell Hickey MD;  [facility];  Service: Thoracic    PROSTATECTOMY  2001    THORACOTOMY Right 9/1/2023    Procedure: right thoracotomy with Cryo-Ablation;  Surgeon: Jessica Neal MD;  [facility];  Service: Thoracic    TONSILLECTOMY       Family History   Problem Relation Age of Onset    Dementia Mother             Breast cancer Sister             Prostate cancer Brother         Received Radiation     Social History     Socioeconomic History    Marital status: /Civil Union     Spouse name: Not on file    Number of children: Not on file    Years of education: Not on file    Highest education level: Not on file   Occupational History    Not on file   Tobacco Use    Smoking status: Former     Packs/day: 1.00     Years: 15.00     Total pack years: 15.00     Types: Cigarettes     Quit date: 44     Years since quittin.8     Passive exposure: Never    Smokeless tobacco: Never    Tobacco comments:     i quit when i was 28. not sure of exact dates   Vaping Use    Vaping Use: Never used   Substance and Sexual Activity    Alcohol use: Yes     Alcohol/week: 6.0 standard drinks of alcohol     Types: 6 Cans of beer per week     Comment: socially  last drink 3/23    Drug use: Yes     Types: Marijuana     Comment: gummies foro nausea with chemo    Sexual activity: Yes     Partners: Female     Birth control/protection: None   Other Topics Concern    Not on file   Social History Narrative    Not on file     Social Determinants of Health     Financial Resource Strain: Not on file   Food Insecurity: No Food Insecurity (2023)    Hunger Vital Sign     Worried About Running Out of Food in the Last Year: Never true     Ran Out of Food in the Last Year: Never true   Transportation Needs: No Transportation Needs (2023)    PRAPARE - Transportation     Lack of Transportation (Medical): No     Lack of Transportation (Non-Medical):  No   Physical Activity: Not on file   Stress: Not on file   Social Connections: Not on file   Intimate Partner Violence: Not on file   Housing Stability: Low Risk  (2023)    Housing Stability Vital Sign     Unable to Pay for Housing in the Last Year: No     Number of Places Lived in the Last Year: 1     Unstable Housing in the Last Year: No       Current Outpatient Medications:     amLODIPine (NORVASC) 10 mg tablet, Take 1 tablet (10 mg total) by mouth daily, Disp: 90 tablet, Rfl: 0    atorvastatin (LIPITOR) 40 mg tablet, Take 1 tablet (40 mg total) by mouth daily after dinner, Disp: 30 tablet, Rfl: 0    docusate sodium (COLACE) 100 mg capsule, Take 1 capsule (100 mg total) by mouth daily Do not start before September 6, 2023., Disp: 20 capsule, Rfl: 0    folic acid (KP Folic Acid) 1 mg tablet, Take 1 tablet (1 mg total) by mouth daily, Disp: 30 tablet, Rfl: 11    gabapentin (Neurontin) 300 mg capsule, Take 1 capsule (300 mg total) by mouth 3 (three) times a day for 28 days, Disp: 84 capsule, Rfl: 0    losartan (COZAAR) 50 mg tablet, Take 1 tablet (50 mg total) by mouth daily, Disp: 90 tablet, Rfl: 0    Magnesium Oxide, Elemental, 400 MG TABS, Take 400 mg by mouth in the morning, Disp: 30 tablet, Rfl: 6    ondansetron (ZOFRAN) 8 mg tablet, Take 1 tablet (8 mg total) by mouth every 8 (eight) hours as needed for nausea or vomiting, Disp: 20 tablet, Rfl: 3    oxyCODONE (Roxicodone) 5 immediate release tablet, Take 1 tablet (5 mg total) by mouth every 6 (six) hours as needed for severe pain As needed Max Daily Amount: 20 mg, Disp: 20 tablet, Rfl: 0    methocarbamol (ROBAXIN) 500 mg tablet, Take 1 tablet (500 mg total) by mouth 4 (four) times a day as needed for muscle spasms for up to 5 days (Patient not taking: Reported on 10/18/2023), Disp: 20 tablet, Rfl: 0  No Known Allergies  Vitals:    10/18/23 1249   BP: 126/82   BP Location: Left arm   Patient Position: Sitting   Cuff Size: Standard   Pulse: 74   Resp: 18   Temp: 98.3 °F (36.8 °C)   TempSrc: Temporal   SpO2: 95%   Weight: 84 kg (185 lb 3.2 oz)   Height: 5' 9" (1.753 m)      Pain Score:   2

## 2023-10-18 NOTE — ASSESSMENT & PLAN NOTE
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 He has been evaluated by both radiation oncology and medical onoclogy and has been started on Opdivo for immunotherapy with no further chemotherapy or radiation therapy. He continues to have nerve pain, he is currently on Gabapentin, Tylenol and uses Oxycodone occasionally. I discussed scheduled medication plan including Tylenol, ibuprofen, Gabapentin and prn Oxycodone. I will prescribe one more prescription for Oxycodone (20 tablets) and will refill his Gabapentin for 4 more weeks. He will call back if he continues to have pain despite this medication regimen and we can refer him to [facility] at that point for evaluation for nerve blocks. He will return in March, 6 months post-operatively, for his first surveillance visit with a CT chest. All questions were answered and he is in agreement with this plan.

## 2023-10-18 NOTE — PROGRESS NOTES
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 He has been evaluated by both radiation oncology and medical onoclogy and has been started on Opdivo for immunotherapy with no further chemotherapy or radiation therapy. He continues to have nerve pain, he is currently on Gabapentin, Tylenol and uses Oxycodone occasionally. I discussed scheduled medication plan including Tylenol, ibuprofen, Gabapentin and prn Oxycodone. I will prescribe one more prescription for Oxycodone (20 tablets) and will refill his Gabapentin for 4 more weeks. He will call back if he continues to have pain despite this medication regimen and we can refer him to [facility] at that point for evaluation for nerve blocks. He will return in March, 6 months post-operatively, for his first surveillance visit with a CT chest. All questions were answered and he is in agreement with this plan. Diagnoses and all orders for this visit:    Carcinoma of right lung ([address])  -     CT chest wo contrast; Future    Acute post-thoracotomy pain  -     CT chest wo contrast; Future  -     oxyCODONE (Roxicodone) 5 immediate release tablet; Take 1 tablet (5 mg total) by mouth every 6 (six) hours as needed for severe pain As needed Max Daily Amount: 20 mg  -     gabapentin (Neurontin) 300 mg capsule; Take 1 capsule (300 mg total) by mouth 3 (three) times a day for 28 days          Thoracic History     Diagnosis: adenocarcinoma RUL  Procedure: robotic converted to open right upper lobectomy 9/1/23. Pathology: 4.4x3.5x2.7cm invasive adenocarcinoma, G3, + invasion, +lymphatic invasion. 5/22 lymph nodes involved, including 2R and 11R. Distant mets to right frontal lobe of brain. Stage IV (pT2b, pN2, pM1b). Cancer Staging   Carcinoma of right lung [facility])  Staging form: Lung, AJCC 8th Edition  - Clinical: Stage JAY (cT3, cN1, cM1b) - Signed by Vanessa Lazar MD on 4/13/2023  - Pathologic stage from 9/1/2023: Stage JAY (pT2b, pN2, cM1b) - Signed by Porfirio Platt PA-C on 9/20/2023  Histopathologic type: Adenocarcinoma, NOS  Stage prefix: Initial diagnosis  Histologic grade (G): G3  Histologic grading system: 4 grade system    Oncology History Overview Note   Patient is a 77year old man with Stage JAY ([id number]) lung adenocarcinoma s/p resection of a right frontal metastasis. On 4/28/23 he completed a course of postoperative SRT to a dose of 3000cGy in 5 fractions. 9/1/23 s/p right robotic converted to open right upper lobectomy. Pathology which demonstrated T2b, N2 disease, indicative of requiring adjuvant chemoradiation. He was last seen in radiation on 9/28/23. At that time, RT was not recommended. He returns today for follow up s/p MRI. 10/5/23 MRI Brain BT w wo contrast  IMPRESSION:  Posttreatment change identified within the right parafalcine frontal vertex status post resection and stereotactic radiosurgery. Slight increase in the FLAIR signal change adjacent to the resection cavity likely treatment related with no new enhancement   to suggest residual or recurrent disease. No new metastasis identified. Upcoming:  10/25/23 Hematology Oncology          Metastatic adenocarcinoma [facility])   2023 Initial Diagnosis    Metastatic adenocarcinoma ([address])     3/23/2023 Biopsy    Brain, right frontal mass (biopsy):  - Metastatic non-small cell carcinoma     Comment:  - Tumor cells stain diffusely for CAM5.2, CKAE1/3, CK7, TTF1 and NapsinA with absent CK20, p63, CK5/6, p40 expression. This immunopanel favors a metastatic lung adenocarcinoma.        5/18/2023 - 7/20/2023 Chemotherapy    cyanocobalamin, 1,000 mcg, Intramuscular, Once, 3 of 3 cycles  Administration: 1,000 mcg (6/29/2023), 1,000 mcg (5/18/2023), 1,000 mcg (7/20/2023)  alteplase (CATHFLO), 2 mg, Intracatheter, Every 1 Minute as needed, 4 of 4 cycles  pegfilgrastim (Zain Console), 6 mg, Subcutaneous, Once, 3 of 3 cycles  Administration: 6 mg (6/8/2023), 6 mg (6/29/2023), 6 mg (7/20/2023)  fosaprepitant (EMEND) IVPB, 150 mg, Intravenous, Once, 4 of 4 cycles  Administration: 150 mg (5/18/2023), 150 mg (6/29/2023), 150 mg (7/20/2023), 150 mg (6/8/2023)  nivolumab (OPDIVO) IVPB, 360 mg (150 % of original dose 240 mg), Intravenous, Once, 4 of 4 cycles  Dose modification: 360 mg (original dose 240 mg, Cycle 1, Reason: Dose modified as per discussion with consulting physician)  Administration: 360 mg (5/18/2023), 360 mg (6/8/2023), 360 mg (6/29/2023), 360 mg (7/20/2023)  CARBOplatin (PARAPLATIN) IVPB (GO AUC DOSING), 642.5 mg, Intravenous, Once, 4 of 4 cycles  Administration: 642.5 mg (5/18/2023), 642.5 mg (6/29/2023), 566.5 mg (7/20/2023), 650 mg (6/8/2023)  pemetrexed (ALIMTA) chemo infusion, 980 mg, Intravenous, Once, 4 of 4 cycles  Administration: 1,000 mg (5/18/2023), 1,000 mg (6/29/2023), 1,000 mg (7/20/2023), 1,000 mg (6/8/2023)     10/11/2023 - 10/11/2023 Chemotherapy    alteplase (CATHFLO), 2 mg, Intracatheter, Every 1 Minute as needed, 0 of 6 cycles  nivolumab (OPDIVO) IVPB, 240 mg, Intravenous, Once, 0 of 6 cycles     10/13/2023 -  Chemotherapy    alteplase (CATHFLO), 2 mg, Intracatheter, Every 1 Minute as needed, 1 of 6 cycles  nivolumab (OPDIVO) IVPB, 480 mg, Intravenous, Once, 1 of 6 cycles     Carcinoma of right lung ([address])   4/13/2023 Initial Diagnosis    Carcinoma of right lung ([address])     4/13/2023 -  Cancer Staged    Staging form: Lung, AJCC 8th Edition  - Clinical: Stage JAY (cT3, cN1, cM1b) - Signed by Alonso Rascon MD on 4/13/2023 4/19/2023 - 4/28/2023 Radiation    Plan ID Energy Fractions Dose per Fraction (cGy) Dose Correction (cGy) Total Dose Delivered (cGy) Elapsed Days   SRT R Frontal 6X-FFF 5 / 5 600 0 3,000 9 5/18/2023 - 7/20/2023 Chemotherapy    cyanocobalamin, 1,000 mcg, Intramuscular, Once, 3 of 3 cycles  Administration: 1,000 mcg (6/29/2023), 1,000 mcg (5/18/2023), 1,000 mcg (7/20/2023)  alteplase (CATHFLO), 2 mg, Intracatheter, Every 1 Minute as needed, 4 of 4 cycles  pegfilgrastim (Katheen Strong), 6 mg, Subcutaneous, Once, 3 of 3 cycles  Administration: 6 mg (6/8/2023), 6 mg (6/29/2023), 6 mg (7/20/2023)  fosaprepitant (EMEND) IVPB, 150 mg, Intravenous, Once, 4 of 4 cycles  Administration: 150 mg (5/18/2023), 150 mg (6/29/2023), 150 mg (7/20/2023), 150 mg (6/8/2023)  nivolumab (OPDIVO) IVPB, 360 mg (150 % of original dose 240 mg), Intravenous, Once, 4 of 4 cycles  Dose modification: 360 mg (original dose 240 mg, Cycle 1, Reason: Dose modified as per discussion with consulting physician)  Administration: 360 mg (5/18/2023), 360 mg (6/8/2023), 360 mg (6/29/2023), 360 mg (7/20/2023)  CARBOplatin (PARAPLATIN) IVPB (GOG AUC DOSING), 642.5 mg, Intravenous, Once, 4 of 4 cycles  Administration: 642.5 mg (5/18/2023), 642.5 mg (6/29/2023), 566.5 mg (7/20/2023), 650 mg (6/8/2023)  pemetrexed (ALIMTA) chemo infusion, 980 mg, Intravenous, Once, 4 of 4 cycles  Administration: 1,000 mg (5/18/2023), 1,000 mg (6/29/2023), 1,000 mg (7/20/2023), 1,000 mg (6/8/2023)     9/1/2023 -  Cancer Staged    Staging form: Lung, AJCC 8th Edition  - Pathologic stage from 9/1/2023: Stage JAY (pT2b, pN2, cM1b) - Signed by Oren Heath PA-C on 9/20/2023  Histopathologic type: Adenocarcinoma, NOS  Stage prefix: Initial diagnosis  Histologic grade (G): G3  Histologic grading system: 4 grade system       9/1/2023 Surgery    Right robotic converted to open upper lobectomy      9/1/2023 Biopsy    A. Lung, right upper lobe:     - Invasive poorly differentiated solid adenocarcinoma of the lung, 4.4 cm.     - Tumor invades into, but not through, the visceral pleura (PL1), confirmed by special VVG stains.      - Lymphatic channel invasion by tumor identified. - Metastatic carcinoma present in three of thirteen lymph nodes (3/13). -- Rare fibrotic granulomas present. - All margins are negative for tumor.     - Emphysematous changes. B.  Lymph node, level 8:     - Negative for malignancy (0/1). C.  Lymph node, level 7, #1:     - Negative for malignancy (0/1). D.  Lymph node, level 4R:     - Negative for malignancy (0/1). E.  Lymph node, level 4R, #2:     - Fibrovascular adipose tissue; negative for malignancy.     - No lymphoid tissue identified. F.  Lymph node, level 2R, #1:     - Metastatic carcinoma present in one of three lymph nodes (1/3). G.  Lymph node, level 2R, #2:     - Negative for malignancy (0/1). H.  Lymph node, level 11 posterior:     - Single lymph node positive for metastatic carcinoma (1/1). I.  Lymph node, portion of level 12 on artery:     - Negative for malignancy (0/1). - Non-caseating granulomatous inflammation present. -- Special stains for acid fast bacilli and fungal organisms are negative. 10/11/2023 - 10/11/2023 Chemotherapy    alteplase (CATHFLO), 2 mg, Intracatheter, Every 1 Minute as needed, 0 of 6 cycles  nivolumab (OPDIVO) IVPB, 240 mg, Intravenous, Once, 0 of 6 cycles     10/13/2023 -  Chemotherapy    alteplase (CATHFLO), 2 mg, Intracatheter, Every 1 Minute as needed, 1 of 6 cycles  nivolumab (OPDIVO) IVPB, 480 mg, Intravenous, Once, 1 of 6 cycles         Subjective:    Patient ID: Jose Raul Lemos is a 77 y.o. male. 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 He was last seen in our office on 9/20/2023 at which point his pathology was reviewed.  He had a 4.4cm poorly differentiated carcinoma that did have viable tumor cells and there were 5 lymph nodes positive including level 2R. He was recommended for evaluation by radiation oncology and medical oncology for discussion of adjuvant therapy. He was seen by Dr Nhi Addison of medical oncology and he was started on Nivolumab. He was also seen by radiation oncology and was not recommended for radiation therapy. On discussion he is feeling okay. He still has some residual pain, stabbing/shooting pain along his back and tightness in his thoracotomy incision. He has noted some soft tissue swelling in his post posterior robot port incision. He is walking daily around [facility] where he lives in the [facility] area. He is using IS with 2500cc. He has some pain with deep breaths but no SOB. Denies cough. Denies fevers/chills or recent illness. He had no 30 day readmission from hospital discharge. The following portions of the patient's history were reviewed and updated as appropriate: allergies, current medications, past family history, past medical history, past social history, past surgical history, and problem list.    Review of Systems   Constitutional:  Negative for chills, diaphoresis and unexpected weight change. HENT: Negative. Eyes: Negative. Respiratory:  Negative for cough, shortness of breath and wheezing. Cardiovascular:  Negative for chest pain and leg swelling. Gastrointestinal:  Negative for abdominal pain, diarrhea and nausea. Endocrine: Negative. Genitourinary: Negative. Musculoskeletal: Negative. Skin: Negative. Allergic/Immunologic: Negative. Neurological: Negative. Hematological:  Negative for adenopathy. Does not bruise/bleed easily. Psychiatric/Behavioral: Negative. All other systems reviewed and are negative. Objective:   Physical Exam  Vitals reviewed. Constitutional:       General: He is not in acute distress. Appearance: Normal appearance. He is not ill-appearing. HENT:      Head: Normocephalic.       Nose: Nose normal. Mouth/Throat:      Mouth: Mucous membranes are moist.   Eyes:      Pupils: Pupils are equal, round, and reactive to light. Cardiovascular:      Rate and Rhythm: Normal rate and regular rhythm. Heart sounds: Normal heart sounds. Pulmonary:      Effort: Pulmonary effort is normal.      Breath sounds: Normal breath sounds. No wheezing, rhonchi or rales. Abdominal:      Palpations: Abdomen is soft. Musculoskeletal:         General: No swelling. Normal range of motion. Lymphadenopathy:      Cervical: No cervical adenopathy. Skin:     General: Skin is warm. Neurological:      General: No focal deficit present. Mental Status: He is alert and oriented to person, place, and time. Psychiatric:         Mood and Affect: Mood normal.     /82 (BP Location: Left arm, Patient Position: Sitting, Cuff Size: Standard)   Pulse 82   Temp 98.7 °F (37.1 °C) (Temporal)   Resp 14   Ht 5' 9" (1.753 m)   Wt 83.5 kg (184 lb 1.4 oz)   SpO2 94%   BMI 27.18 kg/m²     XR chest pa & lateral    Result Date: 9/28/2023  Impression Right pleural thickening versus small right pleural effusion. Workstation performed: [id number]     XR chest pa & lateral    Result Date: 9/5/2023  Impression Right chest tube removal with no pneumothorax. Mild pleural thickening versus small loculated effusion along the right chest wall. Mild left base atelectasis. Workstation performed: [id number]      No CT Chest results available for this patient. CT chest abdomen pelvis w contrast    Result Date: 3/21/2023  Narrative CT CHEST, ABDOMEN AND PELVIS WITH IV CONTRAST INDICATION:   metastatic brain mass, RUL lung mass seen on CT head neck. COMPARISON:  None. TECHNIQUE: CT examination of the chest, abdomen and pelvis was performed. Axial, sagittal, and coronal 2D reformatted images were created from the source data and submitted for interpretation. Radiation dose length product (DLP) for this visit:  1044.99 mGy-cm .   This examination, like all CT scans performed in the [facility], was performed utilizing techniques to minimize radiation dose exposure, including the use of iterative reconstruction and automated exposure control. IV Contrast:  100 mL of iohexol (OMNIPAQUE) Enteric Contrast: Enteric contrast was administered. FINDINGS: CHEST LUNGS:  Right upper lobe lung mass seen measuring 3.8 x 3.5 cm. A pleural tag is seen extending from this mass with associated minimal overlying pleural thickening appendix There is no extension of the neck mass to the chest wall There is no contralateral suspicious lung nodule PLEURA:  Mild pleural thickening overlying the area of the right upper lobe mass HEART/GREAT VESSELS: Heart is unremarkable for patient's age. No thoracic aortic aneurysm. Ascending aorta measures 4.2 cm MEDIASTINUM AND ROSEMARY:  Lower right paratracheal lymph nodes are seen measuring about 7.5 mm Subcarinal lymph nodes are seen measuring 6 mm Enlarged right hilar lymph node seen measuring about 1.7 cm CHEST WALL AND LOWER NECK:  No significant axillary lymph node enlargement seen there are no lymphadenopathy in the lower neck ABDOMEN LIVER/BILIARY TREE:  No focal liver lesion GALLBLADDER:  No calcified gallstones. No pericholecystic inflammatory change. SPLEEN:  Unremarkable. PANCREAS:  Unremarkable. ADRENAL GLANDS:  Unremarkable. KIDNEYS/URETERS:  Right renal cyst seen STOMACH AND BOWEL:  No abnormal dilation of the small bowel loops seen Ileocecal junction appear unremarkable The stomach is mildly distended. Mild thickening of the antrum, nonspecific APPENDIX:  No findings to suggest appendicitis. ABDOMINOPELVIC CAVITY:  No ascites. No pneumoperitoneum. Small para-aortic lymph nodes do not meet the criteria for pathological alignment on the bases VESSELS:  Celiac trunk, SMA appear unremarkable EMMA appear unremarkable PELVIS REPRODUCTIVE ORGANS:  Unremarkable for patient's age.  URINARY BLADDER: Unremarkable. ABDOMINAL WALL/INGUINAL REGIONS:  Umbilical hernia seen containing fat OSSEOUS STRUCTURES:  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.,  Congenital No lytic destructive changes     Impression 3.8 x 3.5 cm right upper lobe lung mass with associated overlying pleural tag and adjacent to pleural thickening, this may be due to pleural reaction or mild pleural invasion No contralateral lung nodules or mass Right hilar lymphadenopathy. Small lower right paratracheal left paratracheal lymph node do not meet the criteria for pathologic enlargement on the bases of size or morphology There is no CT evidence of for metastatic disease in the abdomen and pelvis No lytic destructive lesion in the visualized bony skeleton The study was marked in EPIC for significant notification. Workstation performed: [id number]      NM PET CT skull base to mid thigh    Result Date: 7/18/2023  Narrative PET/CT SCAN INDICATION: C34.11: Malignant neoplasm of upper lobe, right bronchus or lung, metastatic right non-small cell lung cancer, status post neoadjuvant chemotherapy, resection of right frontal lobe mass. MODIFIER: PS COMPARISON: CTA chest June 27, 2023, MRI brain June 2023, PET/CT April 2023, CT chest abdomen and pelvis March 2023 CELL TYPE: Non-small cell neoplasm TECHNIQUE:   8.1 mCi F-18-FDG administered IV. Multiplanar attenuation corrected and non attenuation corrected PET images are available for interpretation, and contiguous, low dose, axial CT sections were obtained from the vertex through the femurs. Intravenous contrast material was not utilized. This examination, like all CT scans performed in the [facility], was performed utilizing techniques to minimize radiation dose exposure, including the use of iterative reconstruction and automated exposure control.  Fasting serum glucose: 108 mg/dl FINDINGS: Mediastinal blood flow SUV max 2 Hepatic parenchyma SUV max 2.7 VISUALIZED BRAIN: Postsurgical changes, status post right frontal craniotomy. HEAD/NECK: There is a physiologic distribution of FDG. No FDG avid cervical adenopathy is seen. CT images: Unremarkable. CHEST: Hypermetabolic mass is again demonstrated in the right upper lobe with SUV max of 19 compared to 20 on the previous exam image 107. Right upper paratracheal node is nonspecific with SUV max of 1.9. Size is unchanged. There is interval resolution of hypermetabolic right hilar adenopathy. CT images: Right portacatheter. Right upper lobe mass measures 3.5 x 3.8 cm series 3/107, compared to 4 x 3.9 cm. Right paratracheal nodes, measuring 8 to 10 mm are unchanged and were not showing definite increased metabolic activity. Right hilar adenopathy cannot be assessed on unenhanced exam. Coronary artery calcifications. Gynecomastia. ABDOMEN: No FDG avid soft tissue lesions are seen. CT images: Atherosclerotic disease of the abdominal aorta without aneurysmal dilatation. Fat-containing umbilical hernia. Fat-containing left inguinal hernia. PELVIS: No FDG avid soft tissue lesions are seen. CT images: Unremarkable. OSSEOUS STRUCTURES: No FDG avid lesions are seen. 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 Grade 1 anterolisthesis of L5 on S1. Impression 1. Interval resolution of metabolic activity in association with right hilar nodes with slightly decrease in size of right upper lobe mass. 2. No evidence for metastatic disease to the abdomen or pelvis. 3. Small right paratracheal nodes with very minimal activity which is not diagnostic for malignancy and unchanged. Workstation performed: [id number]     NM PET CT skull base to mid thigh    Result Date: 4/28/2023  Narrative PET/CT SCAN INDICATION: C79.9: Secondary malignant neoplasm of unspecified site Newly diagnosed metastatic adenocarcinoma of the lung. Evaluate disease extent. MODIFIER: PS COMPARISON: No prior PET scans.  Prior CT of chest abdomen and pelvis, 3/20/2023 CELL TYPE: Metastatic non-small cell carcinoma, diagnosed from resection of right frontal brain mass TECHNIQUE:   8.6 mCi F-18-FDG administered IV. Multiplanar attenuation corrected and non attenuation corrected PET images are available for interpretation, and contiguous, low dose, axial CT sections were obtained from the skull base through the femurs. Intravenous contrast material was not utilized. This examination, like all CT scans performed in the [facility], was performed utilizing techniques to minimize radiation dose exposure, including the use of iterative reconstruction and automated exposure control. Fasting serum glucose: 94 mg/dl FINDINGS: VISUALIZED BRAIN: Postsurgical changes, right frontal lobe. Please refer to prior MRI reports HEAD/NECK: There is a physiologic distribution of FDG. No FDG avid cervical adenopathy is seen. CT images: Unremarkable. CHEST: -Right upper lobe lobular marginated 3.6 x 3.4 cm mass touching the right lateral pleural surface, SUV max 20.0. - Multifocal FDG avid right hilar adenopathy, SUV max 6.6 - Trace nonspecific activity within a right paratracheal lymph node which measures 0.9 cm in short axis dimension, SUV max 2.0. Otherwise no evidence of FDG avid mediastinal adenopathy CT images: [id number] catheter is present. No pleural or pericardial effusion. There is coronary artery calcification ABDOMEN: No FDG avid soft tissue lesions are seen. CT images: Shotty retroperitoneal lymph nodes demonstrate no abnormal activity. There is no ascites or hydronephrosis. There is an umbilical adipose containing hernia PELVIS: No FDG avid soft tissue lesions are seen. CT images: Small left inguinal adipose containing hernia. OSSEOUS STRUCTURES: No FDG avid lesions are seen. CT images: Postsurgical changes right frontal bone. Grade 1 anterolisthesis and degenerative disc disease at L5-S1. Than     Impression 1.  FDG avid right upper lobe mass in keeping with primary bronchogenic carcinoma of the lung 2. FDG avid multifocal right hilar adenopathy 3. Trace nonspecific activity within a right paratracheal lymph node with short axis diameter 0.9 cm. 4. No evidence of FDG avid metastatic disease within the neck, abdomen, pelvis, or skeleton Workstation performed: [id number]      No Barium Swallow results available for this patient.

## 2023-10-19 NOTE — PROGRESS NOTES
Follow-up - Radiation Oncology   Edmund Osei 1955 77 y.o. male [id number]      History of Present Illness   Cancer Staging   Carcinoma of right lung [facility])  Staging form: Lung, AJCC 8th Edition  - Clinical: Stage JAY (cT3, cN1, cM1b) - Signed by Alonso Rascon MD on 4/13/2023  - Pathologic stage from 9/1/2023: Stage JAY (pT2b, pN2, cM1b) - Signed by Skyla Barton PA-C on 9/20/2023  Histopathologic type: Adenocarcinoma, NOS  Stage prefix: Initial diagnosis  Histologic grade (G): G3  Histologic grading system: 4 grade system    Mr. Edmund Osei is a 77year old man with Stage JAY ([id number]) lung adenocarcinoma s/p resection of a right frontal metastasis. On 4/28/23 he completed a course of postoperative SRT to a dose of 3000cGy in 5 fractions. He was last seen in clinic on 7/5/23. He thereafter underwent neoadjuvant chemoimmunotherapy (carbo/taxol/nivo from 5/18/23-7/20/23) followed by right thoracotomy with RUL lobectomy and extensive mediastinal LN dissection. He was not recommended for adjuvant RT. He returns today for follow-up. Interval History:  Since his last visit, the patient has been started on adjuvant nivolumab under the care of Dr. Ynes Brower. MRI Brain (10/5/23) demonstrated:  Posttreatment change identified within the right parafalcine frontal vertex status post resection and stereotactic radiosurgery. Slight increase in the FLAIR signal change adjacent to the resection cavity likely treatment related with no new enhancement to suggest residual or recurrent disease. No new metastasis identified. Historical Information   Oncology History   Metastatic adenocarcinoma ([address])   2023 Initial Diagnosis    Metastatic adenocarcinoma ([address])     3/23/2023 Biopsy    Brain, right frontal mass (biopsy):  - Metastatic non-small cell carcinoma     Comment:  - Tumor cells stain diffusely for CAM5.2, CKAE1/3, CK7, TTF1 and NapsinA with absent CK20, p63, CK5/6, p40 expression.   This immunopanel favors a metastatic lung adenocarcinoma.        5/18/2023 - 7/20/2023 Chemotherapy    cyanocobalamin, 1,000 mcg, Intramuscular, Once, 3 of 3 cycles  Administration: 1,000 mcg (6/29/2023), 1,000 mcg (5/18/2023), 1,000 mcg (7/20/2023)  alteplase (CATHFLO), 2 mg, Intracatheter, Every 1 Minute as needed, 4 of 4 cycles  pegfilgrastim (Katheen Strong), 6 mg, Subcutaneous, Once, 3 of 3 cycles  Administration: 6 mg (6/8/2023), 6 mg (6/29/2023), 6 mg (7/20/2023)  fosaprepitant (EMEND) IVPB, 150 mg, Intravenous, Once, 4 of 4 cycles  Administration: 150 mg (5/18/2023), 150 mg (6/29/2023), 150 mg (7/20/2023), 150 mg (6/8/2023)  nivolumab (OPDIVO) IVPB, 360 mg (150 % of original dose 240 mg), Intravenous, Once, 4 of 4 cycles  Dose modification: 360 mg (original dose 240 mg, Cycle 1, Reason: Dose modified as per discussion with consulting physician)  Administration: 360 mg (5/18/2023), 360 mg (6/8/2023), 360 mg (6/29/2023), 360 mg (7/20/2023)  CARBOplatin (PARAPLATIN) IVPB (GO AUC DOSING), 642.5 mg, Intravenous, Once, 4 of 4 cycles  Administration: 642.5 mg (5/18/2023), 642.5 mg (6/29/2023), 566.5 mg (7/20/2023), 650 mg (6/8/2023)  pemetrexed (ALIMTA) chemo infusion, 980 mg, Intravenous, Once, 4 of 4 cycles  Administration: 1,000 mg (5/18/2023), 1,000 mg (6/29/2023), 1,000 mg (7/20/2023), 1,000 mg (6/8/2023)     10/11/2023 - 10/11/2023 Chemotherapy    alteplase (CATHFLO), 2 mg, Intracatheter, Every 1 Minute as needed, 0 of 6 cycles  nivolumab (OPDIVO) IVPB, 240 mg, Intravenous, Once, 0 of 6 cycles     10/13/2023 -  Chemotherapy    alteplase (CATHFLO), 2 mg, Intracatheter, Every 1 Minute as needed, 1 of 6 cycles  nivolumab (OPDIVO) IVPB, 480 mg, Intravenous, Once, 1 of 6 cycles     Carcinoma of right lung ([address])   4/13/2023 Initial Diagnosis    Carcinoma of right lung ([address])     4/13/2023 -  Cancer Staged    Staging form: Lung, AJCC 8th Edition  - Clinical: Stage JAY (cT3, cN1, cM1b) - Signed by Yo Mccord MD on 4/13/2023 4/19/2023 - 4/28/2023 Radiation    Plan ID Energy Fractions Dose per Fraction (cGy) Dose Correction (cGy) Total Dose Delivered (cGy) Elapsed Days   SRT R Frontal 6X-FFF 5 / 5 600 0 3,000 9         5/18/2023 - 7/20/2023 Chemotherapy    cyanocobalamin, 1,000 mcg, Intramuscular, Once, 3 of 3 cycles  Administration: 1,000 mcg (6/29/2023), 1,000 mcg (5/18/2023), 1,000 mcg (7/20/2023)  alteplase (CATHFLO), 2 mg, Intracatheter, Every 1 Minute as needed, 4 of 4 cycles  pegfilgrastim (August Ureña), 6 mg, Subcutaneous, Once, 3 of 3 cycles  Administration: 6 mg (6/8/2023), 6 mg (6/29/2023), 6 mg (7/20/2023)  fosaprepitant (EMEND) IVPB, 150 mg, Intravenous, Once, 4 of 4 cycles  Administration: 150 mg (5/18/2023), 150 mg (6/29/2023), 150 mg (7/20/2023), 150 mg (6/8/2023)  nivolumab (OPDIVO) IVPB, 360 mg (150 % of original dose 240 mg), Intravenous, Once, 4 of 4 cycles  Dose modification: 360 mg (original dose 240 mg, Cycle 1, Reason: Dose modified as per discussion with consulting physician)  Administration: 360 mg (5/18/2023), 360 mg (6/8/2023), 360 mg (6/29/2023), 360 mg (7/20/2023)  CARBOplatin (PARAPLATIN) IVPB (GOG AUC DOSING), 642.5 mg, Intravenous, Once, 4 of 4 cycles  Administration: 642.5 mg (5/18/2023), 642.5 mg (6/29/2023), 566.5 mg (7/20/2023), 650 mg (6/8/2023)  pemetrexed (ALIMTA) chemo infusion, 980 mg, Intravenous, Once, 4 of 4 cycles  Administration: 1,000 mg (5/18/2023), 1,000 mg (6/29/2023), 1,000 mg (7/20/2023), 1,000 mg (6/8/2023)     9/1/2023 -  Cancer Staged    Staging form: Lung, AJCC 8th Edition  - Pathologic stage from 9/1/2023: Stage JAY (pT2b, pN2, cM1b) - Signed by Thuy Dumas PA-C on 9/20/2023  Histopathologic type: Adenocarcinoma, NOS  Stage prefix: Initial diagnosis  Histologic grade (G): G3  Histologic grading system: 4 grade system       9/1/2023 Surgery    Right robotic converted to open upper lobectomy      9/1/2023 Biopsy    A.   Lung, right upper lobe:     - Invasive poorly differentiated solid adenocarcinoma of the lung, 4.4 cm.     - Tumor invades into, but not through, the visceral pleura (PL1), confirmed by special VVG stains. - Lymphatic channel invasion by tumor identified. - Metastatic carcinoma present in three of thirteen lymph nodes (3/13). -- Rare fibrotic granulomas present. - All margins are negative for tumor.     - Emphysematous changes. B.  Lymph node, level 8:     - Negative for malignancy (0/1). C.  Lymph node, level 7, #1:     - Negative for malignancy (0/1). D.  Lymph node, level 4R:     - Negative for malignancy (0/1). E.  Lymph node, level 4R, #2:     - Fibrovascular adipose tissue; negative for malignancy.     - No lymphoid tissue identified. F.  Lymph node, level 2R, #1:     - Metastatic carcinoma present in one of three lymph nodes (1/3). G.  Lymph node, level 2R, #2:     - Negative for malignancy (0/1). H.  Lymph node, level 11 posterior:     - Single lymph node positive for metastatic carcinoma (1/1). I.  Lymph node, portion of level 12 on artery:     - Negative for malignancy (0/1). - Non-caseating granulomatous inflammation present. -- Special stains for acid fast bacilli and fungal organisms are negative.         10/11/2023 - 10/11/2023 Chemotherapy    alteplase (CATHFLO), 2 mg, Intracatheter, Every 1 Minute as needed, 0 of 6 cycles  nivolumab (OPDIVO) IVPB, 240 mg, Intravenous, Once, 0 of 6 cycles     10/13/2023 -  Chemotherapy    alteplase (CATHFLO), 2 mg, Intracatheter, Every 1 Minute as needed, 1 of 6 cycles  nivolumab (OPDIVO) IVPB, 480 mg, Intravenous, Once, 1 of 6 cycles         Past Medical History:   Diagnosis Date    Cancer ([address]) 2001    Receint lung and brain lesions and prostate cancer in 2001    Hypertension     Prostate cancer ([address]) 2001    Rectal bleeding     Stroke [facility])     2011       Past Surgical History:   Procedure Laterality Date    COLONOSCOPY      CRANIOTOMY Right 3/23/2023 Procedure: Right frontal CRANIOTOMY IMAGE-GUIDED FOR TUMOR;  Surgeon: Olivia Martinez MD;  Location: [facility];  Service: Neurosurgery    IR PORT PLACEMENT  2023     [address] INCL FLUOR GDNCE DX W/CELL WASHG 44 [facility] N/A 2023    Procedure: Romero Settler;  Surgeon: Quang Del Toro MD;  Location: [facility];  Service: Thoracic    [place] CYSTOURETHROSCOPY N/A 3/23/2023    Procedure: Yoanna Arzate;  Surgeon: Socorro Silva MD;  Location[facility];  Service: Urology    [place] THORACOSCOPY W/LOBECTOMY SINGLE LOBE Right 2023    Procedure: robotic assisted converted to open right upper lobectomy, lymph node dissection;  Surgeon: Quang Del Toro MD;  Location: [facility];  Service: Thoracic    PROSTATECTOMY  2001    THORACOTOMY Right 2023    Procedure: right thoracotomy with Cryo-Ablation;  Surgeon: Quang Del Toro MD;  [facility];  Service: Thoracic    TONSILLECTOMY         Social History   Social History     Substance and Sexual Activity   Alcohol Use Yes    Alcohol/week: 6.0 standard drinks of alcohol    Types: 6 Cans of beer per week    Comment: socially  last drink 3/23     Social History     Substance and Sexual Activity   Drug Use Yes    Types: Marijuana    Comment: gummies foro nausea with chemo     Social History     Tobacco Use   Smoking Status Former    Packs/day: 1.00    Years: 15.00    Total pack years: 15.00    Types: Cigarettes    Quit date:     Years since quittin.8    Passive exposure: Never   Smokeless Tobacco Never   Tobacco Comments    i quit when i was 28. not sure of exact dates         Meds/Allergies     Current Outpatient Medications:     amLODIPine (NORVASC) 10 mg tablet, Take 1 tablet (10 mg total) by mouth daily, Disp: 90 tablet, Rfl: 0    atorvastatin (LIPITOR) 40 mg tablet, Take 1 tablet (40 mg total) by mouth daily after dinner, Disp: 30 tablet, Rfl: 0    docusate sodium (COLACE) 100 mg capsule, Take 1 capsule (100 mg total) by mouth daily Do not start before September 6, 2023., Disp: 20 capsule, Rfl: 0    folic acid (KP Folic Acid) 1 mg tablet, Take 1 tablet (1 mg total) by mouth daily, Disp: 30 tablet, Rfl: 11    gabapentin (Neurontin) 300 mg capsule, Take 1 capsule (300 mg total) by mouth 3 (three) times a day for 28 days, Disp: 84 capsule, Rfl: 0    losartan (COZAAR) 50 mg tablet, Take 1 tablet (50 mg total) by mouth daily, Disp: 90 tablet, Rfl: 0    Magnesium Oxide, Elemental, 400 MG TABS, Take 400 mg by mouth in the morning, Disp: 30 tablet, Rfl: 6    ondansetron (ZOFRAN) 8 mg tablet, Take 1 tablet (8 mg total) by mouth every 8 (eight) hours as needed for nausea or vomiting, Disp: 20 tablet, Rfl: 3    oxyCODONE (Roxicodone) 5 immediate release tablet, Take 1 tablet (5 mg total) by mouth every 6 (six) hours as needed for severe pain As needed Max Daily Amount: 20 mg, Disp: 20 tablet, Rfl: 0    methocarbamol (ROBAXIN) 500 mg tablet, Take 1 tablet (500 mg total) by mouth 4 (four) times a day as needed for muscle spasms for up to 5 days (Patient not taking: Reported on 10/18/2023), Disp: 20 tablet, Rfl: 0  No Known Allergies    Review of Systems   Constitutional:  Positive for fatigue. HENT:  Positive for hearing loss ([place], not new). Eyes: Negative. Respiratory:  Positive for shortness of breath (Mild, with exertion). Cardiovascular: Negative. Gastrointestinal: Negative. Endocrine: Negative. Genitourinary: Negative. Musculoskeletal:         Surgical incision site with some discomfort   Allergic/Immunologic: Negative. Neurological: Negative. Hematological: Negative. Psychiatric/Behavioral: Negative.           OBJECTIVE:   /82 (BP Location: Left arm, Patient Position: Sitting, Cuff Size: Standard)   Pulse 74   Temp 98.3 °F (36.8 °C) (Temporal)   Resp 18   Ht 5' 9" (1.753 m)   Wt 84 kg (185 lb 3.2 oz)   SpO2 95%   BMI 27.35 kg/m²   Pain Assessment:  0  ECOG/Zubrod/WHO: 1 - Symptomatic but completely ambulatory    Physical Exam   Well appearing. NAD. No increased work of breathing. Extremities warm and well perfused. No overt neurologic deficits.      RESULTS    Lab Results:   Recent Results (from the past 672 hour(s))   CBC and differential    Collection Time: 10/10/23 10:11 AM   Result Value Ref Range    WBC 5.81 4.31 - 10.16 Thousand/uL    RBC 4.38 3.88 - 5.62 Million/uL    Hemoglobin 13.2 12.0 - 17.0 g/dL    Hematocrit 39.9 36.5 - 49.3 %    MCV 91 82 - 98 fL    MCH 30.1 26.8 - 34.3 pg    MCHC 33.1 31.4 - 37.4 g/dL    RDW 11.9 11.6 - 15.1 %    MPV 9.9 8.9 - 12.7 fL    Platelets 071 443 - 312 Thousands/uL    nRBC 0 /100 WBCs    Neutrophils Relative 61 43 - 75 %    Immat GRANS % 1 0 - 2 %    Lymphocytes Relative 25 14 - 44 %    Monocytes Relative 6 4 - 12 %    Eosinophils Relative 6 0 - 6 %    Basophils Relative 1 0 - 1 %    Neutrophils Absolute 3.55 1.85 - 7.62 Thousands/µL    Immature Grans Absolute 0.05 0.00 - 0.20 Thousand/uL    Lymphocytes Absolute 1.45 0.60 - 4.47 Thousands/µL    Monocytes Absolute 0.37 0.17 - 1.22 Thousand/µL    Eosinophils Absolute 0.33 0.00 - 0.61 Thousand/µL    Basophils Absolute 0.06 0.00 - 0.10 Thousands/µL   Comprehensive metabolic panel    Collection Time: 10/10/23 10:11 AM   Result Value Ref Range    Sodium 142 135 - 147 mmol/L    Potassium 3.6 3.5 - 5.3 mmol/L    Chloride 104 96 - 108 mmol/L    CO2 29 21 - 32 mmol/L    ANION GAP 9 mmol/L    BUN 11 5 - 25 mg/dL    Creatinine 0.82 0.60 - 1.30 mg/dL    Glucose 132 65 - 140 mg/dL    Calcium 9.0 8.4 - 10.2 mg/dL    AST 18 13 - 39 U/L    ALT 12 7 - 52 U/L    Alkaline Phosphatase 70 34 - 104 U/L    Total Protein 7.1 6.4 - 8.4 g/dL    Albumin 4.3 3.5 - 5.0 g/dL    Total Bilirubin 0.46 0.20 - 1.00 mg/dL    eGFR 89 ml/min/1.73sq m   TSH, 3rd generation with Free T4 reflex    Collection Time: 10/10/23 10:11 AM   Result Value Ref Range    TSH 3RD GENERATON 2.222 0.450 - 4.500 uIU/mL   T3, free    Collection Time: 10/10/23 10:11 AM Result Value Ref Range    T3, Free 3.42 2.50 - 3.90 pg/mL       Imaging Studies:MRI Brain BT w wo Contrast    Result Date: 10/11/2023  Narrative: MRI BRAIN WITH AND WITHOUT CONTRAST INDICATION: Prior craniotomy for tumor resection with postop stereotactic radiosurgery. History of lung carcinoma with brain metastasis. COMPARISON: Most recently 6/26/2023 TECHNIQUE: Sagittal T1, axial T2, axial FLAIR, axial T1, axial gradient imaging, axial diffusion. mL of Gadavist was injected intravenously without immediate consequence. IMAGE QUALITY:   Diagnostic. FINDINGS: BRAIN PARENCHYMA: Patient is status post right parietal convexity craniotomy. There is a small resection cavity identified in the parafalcine region of the right superior frontal lobe with minimal peripheral enhancement which is unchanged from the prior examination. There is a small amount of increased T2/FLAIR signal change seen within the adjacent frontal lobe on series 5 image 21 which is slightly increased from the prior examination likely representing treatment related change. No new enhancement to  suggest residual/recurrent disease. No new enhancing lesion identified. A few scattered white matter hyperintensities are seen within the cerebral hemispheres suggestive of chronic microangiopathic change. No acute ischemia. No acute hemorrhage. VENTRICLES:  Normal. SELLA AND PITUITARY GLAND:  Normal. ORBITS:  Normal. PARANASAL SINUSES: Mild mucosal thickening of the maxillary sinuses and ethmoid air cells. VASCULATURE:  Evaluation of the major intracranial vasculature demonstrates appropriate flow voids. CALVARIUM AND SKULL BASE: Stable postoperative change at the craniotomy site. EXTRACRANIAL SOFT TISSUES:  Normal.     Impression: Posttreatment change identified within the right parafalcine frontal vertex status post resection and stereotactic radiosurgery.  Slight increase in the FLAIR signal change adjacent to the resection cavity likely treatment related with no new enhancement to suggest residual or recurrent disease. No new metastasis identified. Workstation performed: [id number]     XR chest pa & lateral    Result Date: 9/28/2023  Narrative: CHEST INDICATION:   R91.1: Solitary pulmonary nodule. Per prior reports patient has had a right upper lobectomy. COMPARISON: 9/4/2023 EXAM PERFORMED/VIEWS:  XR CHEST PA & LATERAL FINDINGS: Stable right chest port. Cardiomediastinal silhouette appears unremarkable. The lungs are clear. No pneumothorax. Blunting of the right costophrenic angle may represent postoperative pleural thickening or a small pleural effusion. Osseous structures appear within normal limits for patient age. Impression: Right pleural thickening versus small right pleural effusion. Workstation performed: [id number]           Assessment/Plan:  Orders Placed This Encounter   Procedures    MRI brain w wo contrast      We reviewed the patient's recent repeat MRI brain in detail in comparison to multiple prior imaging studies. On my review right frontal postoperative cavity remains clear with no evidence of pathologic enhancement; additionally there are no new areas of abnormal enhancement elsewhere concerning for new progression. I have recommended repeat MRI Brain in 3 months with RTC thereafter. I will remain available in the interim should any questions or concerns arise at any point. Tomasa Galdamez MD  10/19/2023,2:35 PM    Portions of the record may have been created with voice recognition software. Occasional wrong word or "sound a like" substitutions may have occurred due to the inherent limitations of voice recognition software. Read the chart carefully and recognize, using context, where substitutions have occurred.

## 2023-10-20 ENCOUNTER — OFFICE VISIT (OUTPATIENT)
Dept: FAMILY MEDICINE CLINIC | Facility: CLINIC | Age: 68
End: 2023-10-20
Payer: COMMERCIAL

## 2023-10-20 VITALS
SYSTOLIC BLOOD PRESSURE: 124 MMHG | WEIGHT: 187.2 LBS | BODY MASS INDEX: 27.73 KG/M2 | DIASTOLIC BLOOD PRESSURE: 78 MMHG | HEART RATE: 70 BPM | TEMPERATURE: 96.2 F | HEIGHT: 69 IN | OXYGEN SATURATION: 94 %

## 2023-10-20 DIAGNOSIS — Z23 ENCOUNTER FOR IMMUNIZATION: ICD-10-CM

## 2023-10-20 DIAGNOSIS — C79.9 METASTATIC ADENOCARCINOMA (HCC): ICD-10-CM

## 2023-10-20 DIAGNOSIS — E78.00 HYPERCHOLESTEROLEMIA: ICD-10-CM

## 2023-10-20 DIAGNOSIS — G93.6 CEREBRAL EDEMA (HCC): ICD-10-CM

## 2023-10-20 DIAGNOSIS — E66.3 OVERWEIGHT (BMI 25.0-29.9): ICD-10-CM

## 2023-10-20 DIAGNOSIS — G93.5 BRAIN COMPRESSION (HCC): ICD-10-CM

## 2023-10-20 DIAGNOSIS — Z00.00 ANNUAL PHYSICAL EXAM: Primary | ICD-10-CM

## 2023-10-20 DIAGNOSIS — I10 ESSENTIAL HYPERTENSION: ICD-10-CM

## 2023-10-20 DIAGNOSIS — C34.91 CARCINOMA OF RIGHT LUNG (HCC): ICD-10-CM

## 2023-10-20 PROBLEM — I63.9 CVA (CEREBRAL VASCULAR ACCIDENT) (HCC): Status: RESOLVED | Noted: 2021-08-12 | Resolved: 2023-10-20

## 2023-10-20 PROBLEM — G93.89 BRAIN MASS: Status: RESOLVED | Noted: 2023-03-20 | Resolved: 2023-10-20

## 2023-10-20 PROCEDURE — 90662 IIV NO PRSV INCREASED AG IM: CPT

## 2023-10-20 PROCEDURE — 99213 OFFICE O/P EST LOW 20 MIN: CPT | Performed by: FAMILY MEDICINE

## 2023-10-20 PROCEDURE — 99397 PER PM REEVAL EST PAT 65+ YR: CPT | Performed by: FAMILY MEDICINE

## 2023-10-20 PROCEDURE — 90471 IMMUNIZATION ADMIN: CPT

## 2023-10-20 RX ORDER — ATORVASTATIN CALCIUM 40 MG/1
40 TABLET, FILM COATED ORAL
Qty: 30 TABLET | Refills: 0 | Status: SHIPPED | OUTPATIENT
Start: 2023-10-20 | End: 2023-11-19

## 2023-10-20 NOTE — PROGRESS NOTES
Assessment/Plan:    No problem-specific Assessment & Plan notes found for this encounter. Diagnoses and all orders for this visit:    Annual physical exam    Essential hypertension  Comments:  no change in the medication    Hypercholesterolemia  Comments:  pt counseled on diet and exercise  Orders:  -     atorvastatin (LIPITOR) 40 mg tablet; Take 1 tablet (40 mg total) by mouth daily after dinner    Encounter for immunization  -     influenza vaccine, high-dose, PF 0.7 mL (FLUZONE HIGH-DOSE)    Overweight (BMI 25.0-29. 9)  Comments:  pt counseled on diet and exercise    Metastatic adenocarcinoma ([address])    Brain compression ([address])    Cerebral edema ([address])    Carcinoma of right lung ([address])          PHQ-2/9 Depression Screening    Little interest or pleasure in doing things: 0 - not at all  Feeling down, depressed, or hopeless: 0 - not at all  PHQ-2 Score: 0  PHQ-2 Interpretation: Negative depression screen            Subjective:      Patient ID: Linda Oquendo is a 77 y.o. male. Pt here for annual physical        The following portions of the patient's history were reviewed and updated as appropriate: allergies, current medications, past family history, past medical history, past social history, past surgical history, and problem list.    Review of Systems   Constitutional: Negative. Negative for chills, fatigue and fever. HENT: Negative. Negative for hearing loss. Eyes: Negative. Negative for visual disturbance. Respiratory:  Negative for shortness of breath and wheezing. Cardiovascular:  Negative for chest pain and palpitations. Gastrointestinal:  Negative for abdominal pain, blood in stool, constipation, diarrhea, nausea and vomiting. Endocrine: Negative. Genitourinary:  Negative for difficulty urinating and dysuria. Musculoskeletal:  Negative for arthralgias and myalgias. Skin: Negative. Allergic/Immunologic: Negative. Neurological:  Negative for seizures and syncope. Hematological:  Negative for adenopathy. Psychiatric/Behavioral: Negative. Objective:    /78 (BP Location: Left arm, Patient Position: Sitting)   Pulse 70   Temp (!) 96.2 °F (35.7 °C) (Tympanic)   Ht 5' 9" (1.753 m)   Wt 84.9 kg (187 lb 3.2 oz)   SpO2 94%   BMI 27.64 kg/m²      Physical Exam  Vitals and nursing note reviewed. Constitutional:       General: He is not in acute distress. Appearance: Normal appearance. He is well-developed. He is not ill-appearing, toxic-appearing or diaphoretic. HENT:      Head: Normocephalic and atraumatic. Right Ear: Tympanic membrane, ear canal and external ear normal. There is no impacted cerumen. Left Ear: Tympanic membrane, ear canal and external ear normal. There is no impacted cerumen. Nose: Nose normal. No congestion or rhinorrhea. Mouth/Throat:      Mouth: Mucous membranes are moist.      Pharynx: Oropharynx is clear. No oropharyngeal exudate or posterior oropharyngeal erythema. Eyes:      General: No scleral icterus. Right eye: No discharge. Left eye: No discharge. Extraocular Movements: Extraocular movements intact. Conjunctiva/sclera: Conjunctivae normal.      Pupils: Pupils are equal, round, and reactive to light. Cardiovascular:      Rate and Rhythm: Normal rate and regular rhythm. Pulses: Normal pulses. Heart sounds: Normal heart sounds. No murmur heard. No friction rub. No gallop. Pulmonary:      Effort: Pulmonary effort is normal. No respiratory distress. Breath sounds: Normal breath sounds. No wheezing, rhonchi or rales. Abdominal:      General: Bowel sounds are normal. There is no distension. Palpations: Abdomen is soft. There is no mass. Tenderness: There is no abdominal tenderness. There is no guarding or rebound. Musculoskeletal:         General: No swelling or deformity. Normal range of motion.       Cervical back: Normal range of motion and neck supple. No rigidity. Right lower leg: No edema. Left lower leg: No edema. Lymphadenopathy:      Cervical: No cervical adenopathy. Skin:     General: Skin is warm and dry. Findings: No rash. Neurological:      General: No focal deficit present. Mental Status: He is alert and oriented to person, place, and time. Sensory: No sensory deficit. Motor: No weakness. Coordination: Coordination normal.      Gait: Gait normal.      Deep Tendon Reflexes: Reflexes are normal and symmetric. Reflexes normal.   Psychiatric:         Mood and Affect: Mood normal.         Behavior: Behavior normal.         Thought Content: Thought content normal.         Judgment: Judgment normal.         BMI Counseling: Body mass index is 27.64 kg/m². The BMI is above normal. Nutrition recommendations include reducing portion sizes and 3-5 servings of fruits/vegetables daily. Exercise recommendations include moderate aerobic physical activity for 150 minutes/week and exercising 3-5 times per week.

## 2023-10-23 ENCOUNTER — TELEPHONE (OUTPATIENT)
Dept: HEMATOLOGY ONCOLOGY | Facility: CLINIC | Age: 68
End: 2023-10-23

## 2023-10-23 NOTE — TELEPHONE ENCOUNTER
Patient Call    Who are you speaking with? [employer]    If it is not the patient, are they listed on an active communication consent form? N/A   What is the reason for this call? Clark Grant from [employer] calling in stating that the facility for the patient's treatments is showing out of network, and is wondering if we received any letter or statement disclosing this. Clark Grant would also like to know what facility name that this was submitted to insurance under. Does this require a call back? Yes   If a call back is required, please list best call back number [phone]   If a call back is required, advise that a message will be forwarded to their care team and someone will return their call as soon as possible. Did you relay this information to the patient?  Yes

## 2023-10-25 ENCOUNTER — TELEPHONE (OUTPATIENT)
Dept: HEMATOLOGY ONCOLOGY | Facility: CLINIC | Age: 68
End: 2023-10-25

## 2023-10-25 ENCOUNTER — OFFICE VISIT (OUTPATIENT)
Dept: HEMATOLOGY ONCOLOGY | Facility: CLINIC | Age: 68
End: 2023-10-25
Payer: COMMERCIAL

## 2023-10-25 VITALS
BODY MASS INDEX: 27.99 KG/M2 | DIASTOLIC BLOOD PRESSURE: 82 MMHG | RESPIRATION RATE: 17 BRPM | TEMPERATURE: 97.6 F | WEIGHT: 189 LBS | HEART RATE: 65 BPM | SYSTOLIC BLOOD PRESSURE: 120 MMHG | OXYGEN SATURATION: 96 % | HEIGHT: 69 IN

## 2023-10-25 DIAGNOSIS — C79.9 METASTATIC ADENOCARCINOMA (HCC): ICD-10-CM

## 2023-10-25 DIAGNOSIS — C79.31 METASTASIS TO BRAIN (HCC): ICD-10-CM

## 2023-10-25 DIAGNOSIS — C79.9 METASTATIC ADENOCARCINOMA (HCC): Primary | ICD-10-CM

## 2023-10-25 DIAGNOSIS — D70.1 CHEMOTHERAPY-INDUCED NEUTROPENIA: ICD-10-CM

## 2023-10-25 DIAGNOSIS — C34.11 MALIGNANT NEOPLASM OF UPPER LOBE OF RIGHT LUNG (HCC): Primary | ICD-10-CM

## 2023-10-25 DIAGNOSIS — C34.91 CARCINOMA OF RIGHT LUNG (HCC): ICD-10-CM

## 2023-10-25 DIAGNOSIS — T45.1X5A CHEMOTHERAPY-INDUCED NEUTROPENIA: ICD-10-CM

## 2023-10-25 PROCEDURE — 99214 OFFICE O/P EST MOD 30 MIN: CPT | Performed by: NURSE PRACTITIONER

## 2023-10-25 RX ORDER — SODIUM CHLORIDE 9 MG/ML
20 INJECTION, SOLUTION INTRAVENOUS ONCE
OUTPATIENT
Start: 2023-12-08

## 2023-10-25 RX ORDER — SODIUM CHLORIDE 9 MG/ML
20 INJECTION, SOLUTION INTRAVENOUS ONCE
OUTPATIENT
Start: 2023-11-10

## 2023-10-25 NOTE — PROGRESS NOTES
Hematology/Oncology Outpatient Follow-up  Alberto Do 77 y.o. male 1955 [id number]    Date:  10/25/2023      Assessment and Plan:  1. Malignant neoplasm of upper lobe of right lung (HCC)  Status post 4 cycles of neoadjuvant chemotherapy carboplatin, Alimta and nivolumab 7/2023. Status post right upper lobe lobectomy 9/1/2023. Was not felt to be a candidate for adjuvant radiation. He was started on adjuvant immunotherapy with nivolumab 480 mg every 4 weeks 10/13/2023 which he is tolerating and will continue. He will get repeat labs closer to the time of his next treatment cycle to 11/10/2023. We did review possible side effects from the immunotherapy. Request he follow-up again in about 5 to 6 weeks from now before proceeding with cycle 3 of his adjuvant immunotherapy or sooner should the need arise.    - CBC and differential; Future  - Comprehensive metabolic panel; Future  - TSH, 3rd generation with Free T4 reflex; Future    2. Metastasis to brain [facility])  He is status post surgical resection of the oligometastatic Brain lesion followed by postoperative RT with SRS/SRT in 5 fractions to avoid local recurrence. He did have repeat MRI earlier this month which appears overall stable. He is already scheduled for a 3-month follow-up MRI of the brain 1/18/2023 which is being ordered by radiation oncology. HPI:  Patient presents today for a follow-up visit accompanied by his wife. He continues to have chest wall nerve pain after his lobectomy but does slowly seem to be improving. He is taking gabapentin. Received his first dose of adjuvant immunotherapy 10/13/2023. Did follow-up with radiation oncology who did not feel he was a candidate for any adjuvant radiation therapy. His baseline laboratory studies before starting his adjuvant treatment 10/10/2023 showed normal CBC and CMP, hemoglobin 13.2.   TSH is normal.    He had repeat surveillance MRI of the brain with and without contrast recently 10/5/2023:  IMPRESSION:  Posttreatment change identified within the right parafalcine frontal vertex status post resection and stereotactic radiosurgery. Slight increase in the FLAIR signal change adjacent to the resection cavity likely treatment related with no new enhancement   to suggest residual or recurrent disease. No new metastasis identified. Oncology History Overview Note   Patient with history of prostate cancer status post prostatectomy in 2001. History of stroke in 2011, hypertension, etc.     The patient presented to the hospital on 3/19/2023 with 1 week of progressive left-sided weakness. CTA of the brain showed right frontal lobe cystic lesion with surrounding vasogenic edema consistent with metastatic disease. The patient then had CT scan of the chest abdomen pelvis on 3/20/2023 which showed:  IMPRESSION:  3.8 x 3.5 cm right upper lobe lung mass with associated overlying pleural tag and adjacent to pleural thickening, this may be due to pleural reaction or mild pleural invasion  No contralateral lung nodules or mass  Right hilar lymphadenopathy. Small lower right paratracheal left paratracheal lymph node do not meet the criteria for pathologic enlargement on the bases of size or morphology  There is no CT evidence of for metastatic disease in the abdomen and pelvis  No lytic destructive lesion in the visualized bony skeleton  The study was marked in EPIC for significant notification. Bone scan was negative. MRI of the brain on 3/21/2023 showed:  IMPRESSION:  Unchanged 2.5 cm right parasagittal frontal lobe vertex mass with central necrosis and marked perilesional vasogenic edema unchanged. Favor metastatic disease (given lung mass) over primary high-grade glioma. Unchanged small subacute infarct in right frontal lobe. Unchanged 0.2 cm leftward midline shift. No new acute intracranial abnormality. The left-sided weakness improved with dexamethasone.   The patient then was taken to the OR on 3/23/2023 and had right frontal craniotomy and resection of the right parasagittal frontal lobe mass. The pathology was compatible with metastatic non-small cell cancer. The molecular evaluation through [facility] showed PD-L1 of 100% with high TMB of 10. No oncogenic  mutation was found. Completed postoperative RT with SRS/SRT in 5 fractions to avoid local recurrence. He was then seen by the radiation oncology team and thoracic surgical team.  His case was discussed at the multidisciplinary thoracic tumor conference. Was felt that he might be a surgical candidate for robotic right upper lobectomy and lymph node dissection after neoadjuvant treatment with chemotherapy and immunotherapy for his clinical T3N1 right upper lobe lung adenocarcinoma. S/P Right Robotic converted to open upper lobectomy 9/1/23    Was not felt to be a candidate for adjuvant radiation therapy. 10/13/23-started adjuvant immunotherapy with nivolumab every 4 weeks     Metastatic adenocarcinoma ([address])   2023 Initial Diagnosis    Metastatic adenocarcinoma ([address])     3/23/2023 Biopsy    Brain, right frontal mass (biopsy):  - Metastatic non-small cell carcinoma     Comment:  - Tumor cells stain diffusely for CAM5.2, CKAE1/3, CK7, TTF1 and NapsinA with absent CK20, p63, CK5/6, p40 expression. This immunopanel favors a metastatic lung adenocarcinoma.        5/18/2023 - 7/20/2023 Chemotherapy    cyanocobalamin, 1,000 mcg, Intramuscular, Once, 3 of 3 cycles  Administration: 1,000 mcg (6/29/2023), 1,000 mcg (5/18/2023), 1,000 mcg (7/20/2023)  alteplase (CATHFLO), 2 mg, Intracatheter, Every 1 Minute as needed, 4 of 4 cycles  pegfilgrastim ([place] Console), 6 mg, Subcutaneous, Once, 3 of 3 cycles  Administration: 6 mg (6/8/2023), 6 mg (6/29/2023), 6 mg (7/20/2023)  fosaprepitant (EMEND) IVPB, 150 mg, Intravenous, Once, 4 of 4 cycles  Administration: 150 mg (5/18/2023), 150 mg (6/29/2023), 150 mg (7/20/2023), 150 mg (6/8/2023)  nivolumab (OPDIVO) IVPB, 360 mg (150 % of original dose 240 mg), Intravenous, Once, 4 of 4 cycles  Dose modification: 360 mg (original dose 240 mg, Cycle 1, Reason: Dose modified as per discussion with consulting physician)  Administration: 360 mg (5/18/2023), 360 mg (6/8/2023), 360 mg (6/29/2023), 360 mg (7/20/2023)  CARBOplatin (PARAPLATIN) IVPB (GO AUC DOSING), 642.5 mg, Intravenous, Once, 4 of 4 cycles  Administration: 642.5 mg (5/18/2023), 642.5 mg (6/29/2023), 566.5 mg (7/20/2023), 650 mg (6/8/2023)  pemetrexed (ALIMTA) chemo infusion, 980 mg, Intravenous, Once, 4 of 4 cycles  Administration: 1,000 mg (5/18/2023), 1,000 mg (6/29/2023), 1,000 mg (7/20/2023), 1,000 mg (6/8/2023)     10/11/2023 - 10/11/2023 Chemotherapy    alteplase (CATHFLO), 2 mg, Intracatheter, Every 1 Minute as needed, 0 of 6 cycles  nivolumab (OPDIVO) IVPB, 240 mg, Intravenous, Once, 0 of 6 cycles     10/13/2023 -  Chemotherapy    alteplase (CATHFLO), 2 mg, Intracatheter, Every 1 Minute as needed, 1 of 6 cycles  nivolumab (OPDIVO) IVPB, 480 mg, Intravenous, Once, 1 of 6 cycles  Administration: 480 mg (10/13/2023)     Carcinoma of right lung ([address])   4/13/2023 Initial Diagnosis    Carcinoma of right lung ([address])     4/13/2023 -  Cancer Staged    Staging form: Lung, AJCC 8th Edition  - Clinical: Stage JAY (cT3, cN1, cM1b) - Signed by Stuart Marques MD on 4/13/2023 4/19/2023 - 4/28/2023 Radiation    Plan ID Energy Fractions Dose per Fraction (cGy) Dose Correction (cGy) Total Dose Delivered (cGy) Elapsed Days   SRT R Frontal 6X-FFF 5 / 5 600 0 3,000 9         5/18/2023 - 7/20/2023 Chemotherapy    cyanocobalamin, 1,000 mcg, Intramuscular, Once, 3 of 3 cycles  Administration: 1,000 mcg (6/29/2023), 1,000 mcg (5/18/2023), 1,000 mcg (7/20/2023)  alteplase (CATHFLO), 2 mg, Intracatheter, Every 1 Minute as needed, 4 of 4 cycles  pegfilgrastim (NEULASTA ONPRO), 6 mg, Subcutaneous, Once, 3 of 3 cycles  Administration: 6 mg (6/8/2023), 6 mg (6/29/2023), 6 mg (7/20/2023)  fosaprepitant (EMEND) IVPB, 150 mg, Intravenous, Once, 4 of 4 cycles  Administration: 150 mg (5/18/2023), 150 mg (6/29/2023), 150 mg (7/20/2023), 150 mg (6/8/2023)  nivolumab (OPDIVO) IVPB, 360 mg (150 % of original dose 240 mg), Intravenous, Once, 4 of 4 cycles  Dose modification: 360 mg (original dose 240 mg, Cycle 1, Reason: Dose modified as per discussion with consulting physician)  Administration: 360 mg (5/18/2023), 360 mg (6/8/2023), 360 mg (6/29/2023), 360 mg (7/20/2023)  CARBOplatin (PARAPLATIN) IVPB (GOG AUC DOSING), 642.5 mg, Intravenous, Once, 4 of 4 cycles  Administration: 642.5 mg (5/18/2023), 642.5 mg (6/29/2023), 566.5 mg (7/20/2023), 650 mg (6/8/2023)  pemetrexed (ALIMTA) chemo infusion, 980 mg, Intravenous, Once, 4 of 4 cycles  Administration: 1,000 mg (5/18/2023), 1,000 mg (6/29/2023), 1,000 mg (7/20/2023), 1,000 mg (6/8/2023)     9/1/2023 -  Cancer Staged    Staging form: Lung, AJCC 8th Edition  - Pathologic stage from 9/1/2023: Stage JAY (pT2b, pN2, cM1b) - Signed by Mahad Lange PA-C on 9/20/2023  Histopathologic type: Adenocarcinoma, NOS  Stage prefix: Initial diagnosis  Histologic grade (G): G3  Histologic grading system: 4 grade system       9/1/2023 Surgery    Right robotic converted to open upper lobectomy      9/1/2023 Biopsy    A. Lung, right upper lobe:     - Invasive poorly differentiated solid adenocarcinoma of the lung, 4.4 cm.     - Tumor invades into, but not through, the visceral pleura (PL1), confirmed by special VVG stains. - Lymphatic channel invasion by tumor identified. - Metastatic carcinoma present in three of thirteen lymph nodes (3/13). -- Rare fibrotic granulomas present. - All margins are negative for tumor.     - Emphysematous changes. B.  Lymph node, level 8:     - Negative for malignancy (0/1). C.  Lymph node, level 7, #1:     - Negative for malignancy (0/1).      D.  Lymph node, level 4R: - Negative for malignancy (0/1). E.  Lymph node, level 4R, #2:     - Fibrovascular adipose tissue; negative for malignancy.     - No lymphoid tissue identified. F.  Lymph node, level 2R, #1:     - Metastatic carcinoma present in one of three lymph nodes (1/3). G.  Lymph node, level 2R, #2:     - Negative for malignancy (0/1). H.  Lymph node, level 11 posterior:     - Single lymph node positive for metastatic carcinoma (1/1). I.  Lymph node, portion of level 12 on artery:     - Negative for malignancy (0/1). - Non-caseating granulomatous inflammation present. -- Special stains for acid fast bacilli and fungal organisms are negative. 10/11/2023 - 10/11/2023 Chemotherapy    alteplase (CATHFLO), 2 mg, Intracatheter, Every 1 Minute as needed, 0 of 6 cycles  nivolumab (OPDIVO) IVPB, 240 mg, Intravenous, Once, 0 of 6 cycles     10/13/2023 -  Chemotherapy    alteplase (CATHFLO), 2 mg, Intracatheter, Every 1 Minute as needed, 1 of 6 cycles  nivolumab (OPDIVO) IVPB, 480 mg, Intravenous, Once, 1 of 6 cycles  Administration: 480 mg (10/13/2023)         ECOG performance status:     ROS: Review of Systems   HENT:  Positive for hearing loss. Respiratory:  Positive for shortness of breath. All other systems reviewed and are negative.       Past Medical History:   Diagnosis Date    Brain compression ([address]) 03/20/2023    Brain mass 03/20/2023    Cancer ([address]) 2001    Receint lung and brain lesions and prostate cancer in 2001    Cerebral edema ([address]) 03/20/2023    CVA (cerebral vascular accident) ([address]) 08/12/2021    Hypertension     Prostate cancer ([address]) 2001    Rectal bleeding     Stroke [facility])     2011         Past Surgical History:   Procedure Laterality Date    COLONOSCOPY      CRANIOTOMY Right 3/23/2023    Procedure: Right frontal CRANIOTOMY IMAGE-GUIDED FOR TUMOR;  Surgeon: Yogesh Khan MD;  Location: [facility];  Service: Neurosurgery    IR PORT PLACEMENT  4/27/2023     [facility] INCL FLUOR GDNCE DX W/CELL WASHG SPX N/A 2023    Procedure: Em Deckerkim;  Surgeon: Aide Brown MD;  Location: [facility];  Service: Thoracic    LA CYSTOURETHROSCOPY N/A 3/23/2023    Procedure: EUA, DEL CASTILLO INSERTION;  Surgeon: Ruddy Monte MD;  Location: [facility];  Service: Urology    LA THORACOSCOPY W/LOBECTOMY SINGLE LOBE Right 2023    Procedure: robotic assisted converted to open right upper lobectomy, lymph node dissection;  Surgeon: Aide Brown MD;  Location: [facility];  Service: Thoracic    PROSTATECTOMY  2001    THORACOTOMY Right 2023    Procedure: right thoracotomy with Cryo-Ablation;  Surgeon: Aide Brown MD;  Location: [facility];  Service: Thoracic    TONSILLECTOMY         Social History     Socioeconomic History    Marital status: /Civil Union     Spouse name: None    Number of children: None    Years of education: None    Highest education level: None   Occupational History    None   Tobacco Use    Smoking status: Former     Packs/day: 1.00     Years: 15.00     Total pack years: 15.00     Types: Cigarettes     Quit date:      Years since quittin.8     Passive exposure: Never    Smokeless tobacco: Never    Tobacco comments:     i quit when i was 28. not sure of exact dates   Vaping Use    Vaping Use: Never used   Substance and Sexual Activity    Alcohol use:  Yes     Alcohol/week: 6.0 standard drinks of alcohol     Types: 6 Cans of beer per week     Comment: socially  last drink 3/23    Drug use: Yes     Types: Marijuana     Comment: gummies foro nausea with chemo    Sexual activity: Yes     Partners: Female     Birth control/protection: None   Other Topics Concern    None   Social History Narrative    None     Social Determinants of Health     Financial Resource Strain: Not on file   Food Insecurity: No Food Insecurity (2023)    Hunger Vital Sign     Worried About Running Out of Food in the Last Year: Never true     Ran Out of Food in the Last Year: Never true   Transportation Needs: No Transportation Needs (2023)    PRAPARE - Transportation     Lack of Transportation (Medical): No     Lack of Transportation (Non-Medical):  No   Physical Activity: Not on file   Stress: Not on file   Social Connections: Not on file   Intimate Partner Violence: Not on file   Housing Stability: Low Risk  (2023)    Housing Stability Vital Sign     Unable to Pay for Housing in the Last Year: No     Number of Places Lived in the Last Year: 1     Unstable Housing in the Last Year: No       Family History   Problem Relation Age of Onset    Dementia Mother             Breast cancer Sister             Prostate cancer Brother         Received Radiation       No Known Allergies      Current Outpatient Medications:     amLODIPine (NORVASC) 10 mg tablet, Take 1 tablet (10 mg total) by mouth daily, Disp: 90 tablet, Rfl: 0    atorvastatin (LIPITOR) 40 mg tablet, Take 1 tablet (40 mg total) by mouth daily after dinner, Disp: 30 tablet, Rfl: 0    docusate sodium (COLACE) 100 mg capsule, Take 1 capsule (100 mg total) by mouth daily Do not start before 2023., Disp: 20 capsule, Rfl: 0    gabapentin (Neurontin) 300 mg capsule, Take 1 capsule (300 mg total) by mouth 3 (three) times a day for 28 days, Disp: 84 capsule, Rfl: 0    losartan (COZAAR) 50 mg tablet, Take 1 tablet (50 mg total) by mouth daily, Disp: 90 tablet, Rfl: 0    ondansetron (ZOFRAN) 8 mg tablet, Take 1 tablet (8 mg total) by mouth every 8 (eight) hours as needed for nausea or vomiting (Patient not taking: Reported on 10/20/2023), Disp: 20 tablet, Rfl: 3    oxyCODONE (Roxicodone) 5 immediate release tablet, Take 1 tablet (5 mg total) by mouth every 6 (six) hours as needed for severe pain As needed Max Daily Amount: 20 mg (Patient not taking: Reported on 10/20/2023), Disp: 20 tablet, Rfl: 0      Physical Exam:  /82 (BP Location: Left arm, Patient Position: Sitting, Cuff Size: Adult) Pulse 65   Temp 97.6 °F (36.4 °C)   Resp 17   Ht 5' 9" (1.753 m)   Wt 85.7 kg (189 lb)   SpO2 96%   BMI 27.91 kg/m²     Physical Exam  Vitals reviewed. Constitutional:       General: He is not in acute distress. Appearance: He is well-developed. He is not diaphoretic. HENT:      Head: Normocephalic and atraumatic. Eyes:      General: Lids are normal. No scleral icterus. Conjunctiva/sclera: Conjunctivae normal.      Pupils: Pupils are equal, round, and reactive to light. Neck:      Thyroid: No thyromegaly. Cardiovascular:      Rate and Rhythm: Normal rate and regular rhythm. Heart sounds: Normal heart sounds. No murmur heard. Pulmonary:      Effort: Pulmonary effort is normal. No respiratory distress. Breath sounds: Decreased breath sounds present. Abdominal:      General: There is no distension. Palpations: Abdomen is soft. There is no hepatomegaly or splenomegaly. Tenderness: There is no abdominal tenderness. Hernia: A hernia is present. Musculoskeletal:         General: No swelling. Normal range of motion. Cervical back: Normal range of motion and neck supple. Lymphadenopathy:      Cervical: No cervical adenopathy. Upper Body:      Right upper body: No axillary adenopathy. Left upper body: No axillary adenopathy. Skin:     General: Skin is warm and dry. Findings: No erythema or rash. Neurological:      General: No focal deficit present. Mental Status: He is alert and oriented to person, place, and time. Psychiatric:         Mood and Affect: Mood and affect normal.         Behavior: Behavior normal. Behavior is cooperative. Thought Content:  Thought content normal.         Judgment: Judgment normal.           Labs:  Lab Results   Component Value Date    WBC 5.81 10/10/2023    HGB 13.2 10/10/2023    HCT 39.9 10/10/2023    MCV 91 10/10/2023     10/10/2023        Patient voiced understanding and agreement in the above discussion. Aware to contact our office with questions/symptoms in the interim. This note has been generated by voice recognition software system. Therefore, there may be spelling, grammar, and or syntax errors. Please contact if questions arise.

## 2023-11-09 ENCOUNTER — HOSPITAL ENCOUNTER (OUTPATIENT)
Dept: INFUSION CENTER | Facility: HOSPITAL | Age: 68
End: 2023-11-09
Payer: COMMERCIAL

## 2023-11-09 VITALS — TEMPERATURE: 97.6 F

## 2023-11-09 DIAGNOSIS — C79.9 METASTATIC ADENOCARCINOMA (HCC): ICD-10-CM

## 2023-11-09 DIAGNOSIS — C34.91 CARCINOMA OF RIGHT LUNG (HCC): ICD-10-CM

## 2023-11-09 DIAGNOSIS — Z45.2 ENCOUNTER FOR CENTRAL LINE CARE: Primary | ICD-10-CM

## 2023-11-09 LAB
ALBUMIN SERPL BCP-MCNC: 4.4 G/DL (ref 3.5–5)
ALP SERPL-CCNC: 79 U/L (ref 34–104)
ALT SERPL W P-5'-P-CCNC: 22 U/L (ref 7–52)
ANION GAP SERPL CALCULATED.3IONS-SCNC: 7 MMOL/L
AST SERPL W P-5'-P-CCNC: 26 U/L (ref 13–39)
BASOPHILS # BLD AUTO: 0.06 THOUSANDS/ÂΜL (ref 0–0.1)
BASOPHILS NFR BLD AUTO: 1 % (ref 0–1)
BILIRUB SERPL-MCNC: 0.96 MG/DL (ref 0.2–1)
BUN SERPL-MCNC: 15 MG/DL (ref 5–25)
CALCIUM SERPL-MCNC: 9.4 MG/DL (ref 8.4–10.2)
CHLORIDE SERPL-SCNC: 105 MMOL/L (ref 96–108)
CO2 SERPL-SCNC: 28 MMOL/L (ref 21–32)
CREAT SERPL-MCNC: 0.78 MG/DL (ref 0.6–1.3)
EOSINOPHIL # BLD AUTO: 0.25 THOUSAND/ÂΜL (ref 0–0.61)
EOSINOPHIL NFR BLD AUTO: 4 % (ref 0–6)
ERYTHROCYTE [DISTWIDTH] IN BLOOD BY AUTOMATED COUNT: 12.5 % (ref 11.6–15.1)
GFR SERPL CREATININE-BSD FRML MDRD: 91 ML/MIN/1.73SQ M
GLUCOSE SERPL-MCNC: 142 MG/DL (ref 65–140)
HCT VFR BLD AUTO: 39.9 % (ref 36.5–49.3)
HGB BLD-MCNC: 13.6 G/DL (ref 12–17)
IMM GRANULOCYTES # BLD AUTO: 0.02 THOUSAND/UL (ref 0–0.2)
IMM GRANULOCYTES NFR BLD AUTO: 0 % (ref 0–2)
LYMPHOCYTES # BLD AUTO: 1.69 THOUSANDS/ÂΜL (ref 0.6–4.47)
LYMPHOCYTES NFR BLD AUTO: 28 % (ref 14–44)
MCH RBC QN AUTO: 30.3 PG (ref 26.8–34.3)
MCHC RBC AUTO-ENTMCNC: 34.1 G/DL (ref 31.4–37.4)
MCV RBC AUTO: 89 FL (ref 82–98)
MONOCYTES # BLD AUTO: 0.5 THOUSAND/ÂΜL (ref 0.17–1.22)
MONOCYTES NFR BLD AUTO: 8 % (ref 4–12)
NEUTROPHILS # BLD AUTO: 3.45 THOUSANDS/ÂΜL (ref 1.85–7.62)
NEUTS SEG NFR BLD AUTO: 59 % (ref 43–75)
NRBC BLD AUTO-RTO: 0 /100 WBCS
PLATELET # BLD AUTO: 209 THOUSANDS/UL (ref 149–390)
PMV BLD AUTO: 9.8 FL (ref 8.9–12.7)
POTASSIUM SERPL-SCNC: 3.5 MMOL/L (ref 3.5–5.3)
PROT SERPL-MCNC: 7.5 G/DL (ref 6.4–8.4)
RBC # BLD AUTO: 4.49 MILLION/UL (ref 3.88–5.62)
SODIUM SERPL-SCNC: 140 MMOL/L (ref 135–147)
T3FREE SERPL-MCNC: 4.59 PG/ML (ref 2.5–3.9)
TSH SERPL DL<=0.05 MIU/L-ACNC: 0.9 UIU/ML (ref 0.45–4.5)
WBC # BLD AUTO: 5.97 THOUSAND/UL (ref 4.31–10.16)

## 2023-11-09 PROCEDURE — 85025 COMPLETE CBC W/AUTO DIFF WBC: CPT | Performed by: INTERNAL MEDICINE

## 2023-11-09 PROCEDURE — 84481 FREE ASSAY (FT-3): CPT | Performed by: INTERNAL MEDICINE

## 2023-11-09 PROCEDURE — 84443 ASSAY THYROID STIM HORMONE: CPT | Performed by: INTERNAL MEDICINE

## 2023-11-09 PROCEDURE — 80053 COMPREHEN METABOLIC PANEL: CPT | Performed by: INTERNAL MEDICINE

## 2023-11-09 NOTE — PROGRESS NOTES
Port flushed freely. Good blood return noted. Central labs drawn per orders. Port deaccessed without issue. Patient tolerated well. AVS declined. Patient is aware of appointment time tomorrow. Patient ambulated off unit without incident. All personal belongings taken with patient.

## 2023-11-10 ENCOUNTER — HOSPITAL ENCOUNTER (OUTPATIENT)
Dept: INFUSION CENTER | Facility: HOSPITAL | Age: 68
End: 2023-11-10
Attending: INTERNAL MEDICINE
Payer: COMMERCIAL

## 2023-11-10 VITALS
RESPIRATION RATE: 18 BRPM | TEMPERATURE: 98.1 F | SYSTOLIC BLOOD PRESSURE: 144 MMHG | HEART RATE: 74 BPM | BODY MASS INDEX: 27.51 KG/M2 | OXYGEN SATURATION: 96 % | DIASTOLIC BLOOD PRESSURE: 89 MMHG | WEIGHT: 186.29 LBS

## 2023-11-10 DIAGNOSIS — C34.91 CARCINOMA OF RIGHT LUNG (HCC): Primary | ICD-10-CM

## 2023-11-10 DIAGNOSIS — C79.9 METASTATIC ADENOCARCINOMA (HCC): ICD-10-CM

## 2023-11-10 DIAGNOSIS — D70.1 CHEMOTHERAPY-INDUCED NEUTROPENIA: ICD-10-CM

## 2023-11-10 DIAGNOSIS — T45.1X5A CHEMOTHERAPY-INDUCED NEUTROPENIA: ICD-10-CM

## 2023-11-10 PROCEDURE — 96413 CHEMO IV INFUSION 1 HR: CPT

## 2023-11-10 RX ORDER — SODIUM CHLORIDE 9 MG/ML
20 INJECTION, SOLUTION INTRAVENOUS ONCE
Status: COMPLETED | OUTPATIENT
Start: 2023-11-10 | End: 2023-11-10

## 2023-11-10 RX ADMIN — SODIUM CHLORIDE 20 ML/HR: 9 INJECTION, SOLUTION INTRAVENOUS at 10:05

## 2023-11-10 RX ADMIN — SODIUM CHLORIDE 480 MG: 9 INJECTION, SOLUTION INTRAVENOUS at 10:05

## 2023-11-10 NOTE — PLAN OF CARE
Problem: Potential for Falls  Goal: Patient will remain free of falls  Description: INTERVENTIONS:    - Keep Call bell within reach    - Consider moving patient to room near nurses station  Outcome: Progressing

## 2023-11-10 NOTE — PROGRESS NOTES
Pt here for Opdivo infusion. Pt offered no complaints today. And is feeling well. Port accessed and brisk blood return noted. Vitals stable and labs 11/9/23 reviewed and within parameter for treatment today. Pt resting comfortably in chair with call bell in place.

## 2023-11-27 DIAGNOSIS — I10 ESSENTIAL HYPERTENSION: ICD-10-CM

## 2023-11-28 RX ORDER — AMLODIPINE BESYLATE 10 MG/1
10 TABLET ORAL DAILY
Qty: 90 TABLET | Refills: 0 | Status: SHIPPED | OUTPATIENT
Start: 2023-11-28

## 2023-11-29 ENCOUNTER — OFFICE VISIT (OUTPATIENT)
Dept: HEMATOLOGY ONCOLOGY | Facility: CLINIC | Age: 68
End: 2023-11-29
Payer: COMMERCIAL

## 2023-11-29 ENCOUNTER — TELEPHONE (OUTPATIENT)
Dept: HEMATOLOGY ONCOLOGY | Facility: CLINIC | Age: 68
End: 2023-11-29

## 2023-11-29 VITALS
DIASTOLIC BLOOD PRESSURE: 80 MMHG | OXYGEN SATURATION: 96 % | RESPIRATION RATE: 18 BRPM | WEIGHT: 189 LBS | BODY MASS INDEX: 27.99 KG/M2 | HEART RATE: 83 BPM | SYSTOLIC BLOOD PRESSURE: 128 MMHG | HEIGHT: 69 IN | TEMPERATURE: 98 F

## 2023-11-29 DIAGNOSIS — D70.1 CHEMOTHERAPY-INDUCED NEUTROPENIA: ICD-10-CM

## 2023-11-29 DIAGNOSIS — T45.1X5A CHEMOTHERAPY-INDUCED NEUTROPENIA: ICD-10-CM

## 2023-11-29 DIAGNOSIS — C79.31 METASTASIS TO BRAIN (HCC): ICD-10-CM

## 2023-11-29 DIAGNOSIS — C79.9 METASTATIC ADENOCARCINOMA (HCC): ICD-10-CM

## 2023-11-29 DIAGNOSIS — C34.91 CARCINOMA OF RIGHT LUNG (HCC): Primary | ICD-10-CM

## 2023-11-29 DIAGNOSIS — E03.2 DRUG-INDUCED HYPOTHYROIDISM: ICD-10-CM

## 2023-11-29 PROCEDURE — 99214 OFFICE O/P EST MOD 30 MIN: CPT | Performed by: INTERNAL MEDICINE

## 2023-11-29 RX ORDER — SODIUM CHLORIDE 9 MG/ML
20 INJECTION, SOLUTION INTRAVENOUS ONCE
OUTPATIENT
Start: 2024-02-02

## 2023-11-29 RX ORDER — SODIUM CHLORIDE 9 MG/ML
20 INJECTION, SOLUTION INTRAVENOUS ONCE
OUTPATIENT
Start: 2024-01-05

## 2023-11-29 NOTE — TELEPHONE ENCOUNTER
Please schedule patient for 1/25/24 at 1PM with Dr. Ynes Brower for a follow up in [facility]. Thank you!

## 2023-11-29 NOTE — PROGRESS NOTES
Hematology/Oncology Outpatient Follow-up  Freddie Jarvis 77 y.o. male 1955 [id number]    Date:  11/29/2023        Assessment and Plan:  1. Carcinoma of right lung ([address])  Clinical stage JAY at diagnosis was (cT3, cN1, cM1b) S/P surgical resection of the oligometastatic brain lesion followed by [facility]. Status post 4 cycles of neoadjuvant chemotherapy carboplatin, Alimta and nivolumab 7/2023. Status post right upper lobe lobectomy 9/1/2023. The final pathology was pT2b, pN2, G3.  R0. Started on adjuvant immunotherapy with nivolumab 480 mg on every 4-week basis on 10/13/2023. The patient is tolerating the immunotherapy without obvious hint of IO related side effects. He stated he is in the process of getting CT scan of the chest in the month of March. His brain MRI on 10/5/2023 did not reveal obvious recurrence. - CBC and differential; Future  - Comprehensive metabolic panel; Future  - TSH, 3rd generation with Free T4 reflex; Future  - T3, free; Future  - CBC and differential; Future  - Comprehensive metabolic panel; Future  - TSH, 3rd generation with Free T4 reflex; Future  - T3, free; Future  - CBC and differential; Standing  - Comprehensive metabolic panel; Standing  - Magnesium; Standing  - TSH, 3rd generation with Free T4 reflex; Standing  - CBC and differential  - Comprehensive metabolic panel  - Magnesium  - TSH, 3rd generation with Free T4 reflex    2. Metastatic adenocarcinoma ([address])  He is status post surgical resection of the oligometastatic Brain lesion followed by postoperative RT with SRS/SRT in 5 fractions to avoid local recurrence. He did have repeat MRI earlier this month which appears overall stable. He is already scheduled for a 3-month follow-up MRI of the brain 1/18/2023 which is being ordered by radiation oncology. - CBC and differential; Future  - Comprehensive metabolic panel; Future  - TSH, 3rd generation with Free T4 reflex; Future  - T3, free;  Future  - CBC and differential; Future  - Comprehensive metabolic panel; Future  - TSH, 3rd generation with Free T4 reflex; Future  - T3, free; Future    3. Chemotherapy-induced neutropenia     - CBC and differential; Future  - Comprehensive metabolic panel; Future  - TSH, 3rd generation with Free T4 reflex; Future  - T3, free; Future  - CBC and differential; Future  - Comprehensive metabolic panel; Future  - TSH, 3rd generation with Free T4 reflex; Future  - T3, free; Future    4. Metastasis to brain ([address])      5. Drug-induced hypothyroidism    - TSH, 3rd generation with Free T4 reflex; Standing  - TSH, 3rd generation with Free T4 reflex        HPI:  The patient came today for follow-up visit accompanied by his wife. Recent blood work on 11/9/2023 showed hemoglobin of 13.6 with normal white cells and platelets. Creatinine 0.7 with normal calcium and liver enzymes. TSH 0.8. Oncology History Overview Note   Patient with history of prostate cancer status post prostatectomy in 2001. History of stroke in 2011, hypertension, etc.     The patient presented to the hospital on 3/19/2023 with 1 week of progressive left-sided weakness. CTA of the brain showed right frontal lobe cystic lesion with surrounding vasogenic edema consistent with metastatic disease. The patient then had CT scan of the chest abdomen pelvis on 3/20/2023 which showed:  IMPRESSION:  3.8 x 3.5 cm right upper lobe lung mass with associated overlying pleural tag and adjacent to pleural thickening, this may be due to pleural reaction or mild pleural invasion  No contralateral lung nodules or mass  Right hilar lymphadenopathy. Small lower right paratracheal left paratracheal lymph node do not meet the criteria for pathologic enlargement on the bases of size or morphology  There is no CT evidence of for metastatic disease in the abdomen and pelvis  No lytic destructive lesion in the visualized bony skeleton  The study was marked in EPIC for significant notification.       Bone scan was negative. MRI of the brain on 3/21/2023 showed:  IMPRESSION:  Unchanged 2.5 cm right parasagittal frontal lobe vertex mass with central necrosis and marked perilesional vasogenic edema unchanged. Favor metastatic disease (given lung mass) over primary high-grade glioma. Unchanged small subacute infarct in right frontal lobe. Unchanged 0.2 cm leftward midline shift. No new acute intracranial abnormality. The left-sided weakness improved with dexamethasone. The patient then was taken to the OR on 3/23/2023 and had right frontal craniotomy and resection of the right parasagittal frontal lobe mass. The pathology was compatible with metastatic non-small cell cancer. The molecular evaluation through [facility] showed PD-L1 of 100% with high TMB of 10. No oncogenic  mutation was found. Completed postoperative RT with SRS/SRT in 5 fractions to avoid local recurrence. He was then seen by the radiation oncology team and thoracic surgical team.  His case was discussed at the multidisciplinary thoracic tumor conference. Was felt that he might be a surgical candidate for robotic right upper lobectomy and lymph node dissection after neoadjuvant treatment with chemotherapy and immunotherapy for his clinical T3N1 right upper lobe lung adenocarcinoma. S/P Right Robotic converted to open upper lobectomy 9/1/23    Was not felt to be a candidate for adjuvant radiation therapy. 10/13/23-started adjuvant immunotherapy with nivolumab every 4 weeks     Metastatic adenocarcinoma ([address])   2023 Initial Diagnosis    Metastatic adenocarcinoma ([address])     3/23/2023 Biopsy    Brain, right frontal mass (biopsy):  - Metastatic non-small cell carcinoma     Comment:  - Tumor cells stain diffusely for CAM5.2, CKAE1/3, CK7, TTF1 and NapsinA with absent CK20, p63, CK5/6, p40 expression. This immunopanel favors a metastatic lung adenocarcinoma.        5/18/2023 - 7/20/2023 Chemotherapy    cyanocobalamin, 1,000 mcg, Intramuscular, Once, 3 of 3 cycles  Administration: 1,000 mcg (6/29/2023), 1,000 mcg (5/18/2023), 1,000 mcg (7/20/2023)  alteplase (CATHFLO), 2 mg, Intracatheter, Every 1 Minute as needed, 4 of 4 cycles  pegfilgrastim (Fredda Hunting), 6 mg, Subcutaneous, Once, 3 of 3 cycles  Administration: 6 mg (6/8/2023), 6 mg (6/29/2023), 6 mg (7/20/2023)  fosaprepitant (EMEND) IVPB, 150 mg, Intravenous, Once, 4 of 4 cycles  Administration: 150 mg (5/18/2023), 150 mg (6/29/2023), 150 mg (7/20/2023), 150 mg (6/8/2023)  nivolumab (OPDIVO) IVPB, 360 mg (150 % of original dose 240 mg), Intravenous, Once, 4 of 4 cycles  Dose modification: 360 mg (original dose 240 mg, Cycle 1, Reason: Dose modified as per discussion with consulting physician)  Administration: 360 mg (5/18/2023), 360 mg (6/8/2023), 360 mg (6/29/2023), 360 mg (7/20/2023)  CARBOplatin (PARAPLATIN) IVPB (GOG AUC DOSING), 642.5 mg, Intravenous, Once, 4 of 4 cycles  Administration: 642.5 mg (5/18/2023), 642.5 mg (6/29/2023), 566.5 mg (7/20/2023), 650 mg (6/8/2023)  pemetrexed (ALIMTA) chemo infusion, 980 mg, Intravenous, Once, 4 of 4 cycles  Administration: 1,000 mg (5/18/2023), 1,000 mg (6/29/2023), 1,000 mg (7/20/2023), 1,000 mg (6/8/2023)     10/11/2023 - 10/11/2023 Chemotherapy    alteplase (CATHFLO), 2 mg, Intracatheter, Every 1 Minute as needed, 0 of 6 cycles  nivolumab (OPDIVO) IVPB, 240 mg, Intravenous, Once, 0 of 6 cycles     10/13/2023 -  Chemotherapy    alteplase (CATHFLO), 2 mg, Intracatheter, Every 1 Minute as needed, 2 of 7 cycles  nivolumab (OPDIVO) IVPB, 480 mg, Intravenous, Once, 2 of 7 cycles  Administration: 480 mg (10/13/2023), 480 mg (11/10/2023)     Carcinoma of right lung ([address])   4/13/2023 Initial Diagnosis    Carcinoma of right lung ([address])     4/13/2023 -  Cancer Staged    Staging form: Lung, AJCC 8th Edition  - Clinical: Stage JAY (cT3, cN1, cM1b) - Signed by Farshad East MD on 4/13/2023 4/19/2023 - 4/28/2023 Radiation    Plan ID Energy Fractions Dose per Fraction (cGy) Dose Correction (cGy) Total Dose Delivered (cGy) Elapsed Days   SRT R Frontal 6X-FFF 5 / 5 600 0 3,000 9         5/18/2023 - 7/20/2023 Chemotherapy    cyanocobalamin, 1,000 mcg, Intramuscular, Once, 3 of 3 cycles  Administration: 1,000 mcg (6/29/2023), 1,000 mcg (5/18/2023), 1,000 mcg (7/20/2023)  alteplase (CATHFLO), 2 mg, Intracatheter, Every 1 Minute as needed, 4 of 4 cycles  pegfilgrastim ([facility]), 6 mg, Subcutaneous, Once, 3 of 3 cycles  Administration: 6 mg (6/8/2023), 6 mg (6/29/2023), 6 mg (7/20/2023)  fosaprepitant (EMEND) IVPB, 150 mg, Intravenous, Once, 4 of 4 cycles  Administration: 150 mg (5/18/2023), 150 mg (6/29/2023), 150 mg (7/20/2023), 150 mg (6/8/2023)  nivolumab (OPDIVO) IVPB, 360 mg (150 % of original dose 240 mg), Intravenous, Once, 4 of 4 cycles  Dose modification: 360 mg (original dose 240 mg, Cycle 1, Reason: Dose modified as per discussion with consulting physician)  Administration: 360 mg (5/18/2023), 360 mg (6/8/2023), 360 mg (6/29/2023), 360 mg (7/20/2023)  CARBOplatin (PARAPLATIN) IVPB (GO AUC DOSING), 642.5 mg, Intravenous, Once, 4 of 4 cycles  Administration: 642.5 mg (5/18/2023), 642.5 mg (6/29/2023), 566.5 mg (7/20/2023), 650 mg (6/8/2023)  pemetrexed (ALIMTA) chemo infusion, 980 mg, Intravenous, Once, 4 of 4 cycles  Administration: 1,000 mg (5/18/2023), 1,000 mg (6/29/2023), 1,000 mg (7/20/2023), 1,000 mg (6/8/2023)     9/1/2023 -  Cancer Staged    Staging form: Lung, AJCC 8th Edition  - Pathologic stage from 9/1/2023: Stage AJY (pT2b, pN2, cM1b) - Signed by Marley Bauer PA-C on 9/20/2023  Histopathologic type: Adenocarcinoma, NOS  Stage prefix: Initial diagnosis  Histologic grade (G): G3  Histologic grading system: 4 grade system       9/1/2023 Surgery    Right robotic converted to open upper lobectomy      9/1/2023 Biopsy    A.   Lung, right upper lobe:     - Invasive poorly differentiated solid adenocarcinoma of the lung, 4.4 cm.     - Tumor invades into, but not through, the visceral pleura (PL1), confirmed by special VVG stains. - Lymphatic channel invasion by tumor identified. - Metastatic carcinoma present in three of thirteen lymph nodes (3/13). -- Rare fibrotic granulomas present. - All margins are negative for tumor.     - Emphysematous changes. B.  Lymph node, level 8:     - Negative for malignancy (0/1). C.  Lymph node, level 7, #1:     - Negative for malignancy (0/1). D.  Lymph node, level 4R:     - Negative for malignancy (0/1). E.  Lymph node, level 4R, #2:     - Fibrovascular adipose tissue; negative for malignancy.     - No lymphoid tissue identified. F.  Lymph node, level 2R, #1:     - Metastatic carcinoma present in one of three lymph nodes (1/3). G.  Lymph node, level 2R, #2:     - Negative for malignancy (0/1). H.  Lymph node, level 11 posterior:     - Single lymph node positive for metastatic carcinoma (1/1). I.  Lymph node, portion of level 12 on artery:     - Negative for malignancy (0/1). - Non-caseating granulomatous inflammation present. -- Special stains for acid fast bacilli and fungal organisms are negative. 10/11/2023 - 10/11/2023 Chemotherapy    alteplase (CATHFLO), 2 mg, Intracatheter, Every 1 Minute as needed, 0 of 6 cycles  nivolumab (OPDIVO) IVPB, 240 mg, Intravenous, Once, 0 of 6 cycles     10/13/2023 -  Chemotherapy    alteplase (CATHFLO), 2 mg, Intracatheter, Every 1 Minute as needed, 2 of 7 cycles  nivolumab (OPDIVO) IVPB, 480 mg, Intravenous, Once, 2 of 7 cycles  Administration: 480 mg (10/13/2023), 480 mg (11/10/2023)         Interval history:    ROS: Review of Systems   Constitutional:  Negative for chills and fever. HENT:  Negative for ear pain and sore throat. Eyes:  Negative for pain and visual disturbance. Respiratory:  Positive for shortness of breath. Negative for cough.     Cardiovascular:  Negative for chest pain and palpitations. Gastrointestinal:  Negative for abdominal pain and vomiting. Genitourinary:  Negative for dysuria and hematuria. Musculoskeletal:  Negative for arthralgias and back pain. Skin:  Negative for color change and rash. Neurological:  Positive for numbness. Negative for seizures and syncope. All other systems reviewed and are negative.       Past Medical History:   Diagnosis Date    Brain compression ([address]) 03/20/2023    Brain mass 03/20/2023    Cancer ([address]) 2001    Receint lung and brain lesions and prostate cancer in 2001    Cerebral edema ([address]) 03/20/2023    CVA (cerebral vascular accident) ([address]) 08/12/2021    Hypertension     Prostate cancer ([address]) 2001    Rectal bleeding     Stroke [facility])     2011         Past Surgical History:   Procedure Laterality Date    COLONOSCOPY      CRANIOTOMY Right 3/23/2023    Procedure: Right frontal CRANIOTOMY IMAGE-GUIDED FOR TUMOR;  Surgeon: Mike Bentley MD;  [facility]: [facility];  Service: Neurosurgery    IR PORT PLACEMENT  4/27/2023     [address] INCL FLUOR GDNCE DX W/CELL WASHG SPX N/A 9/1/2023    Procedure: Em Levi;  Surgeon: Aide Brown MD;  [facility];  Service: Thoracic    NM CYSTOURETHROSCOPY N/A 3/23/2023    Procedure: More Wilson;  Surgeon: Ruddy Monte MD;  [facility];  Service: Urology    NM THORACOSCOPY W/LOBECTOMY SINGLE LOBE Right 9/1/2023    Procedure: robotic assisted converted to open right upper lobectomy, lymph node dissection;  Surgeon: Aide Brown MD;  [facility]: [facility];  Service: Thoracic    PROSTATECTOMY  2001    THORACOTOMY Right 9/1/2023    Procedure: right thoracotomy with Cryo-Ablation;  Surgeon: Aide Brown MD;  [facility];  Service: Thoracic    TONSILLECTOMY         Social History     Socioeconomic History    Marital status: /Civil Union     Spouse name: None    Number of children: None    Years of education: None    Highest education level: None Occupational History    None   Tobacco Use    Smoking status: Former     Packs/day: 1.00     Years: 15.00     Total pack years: 15.00     Types: Cigarettes     Quit date: 44     Years since quittin.9     Passive exposure: Never    Smokeless tobacco: Never    Tobacco comments:     i quit when i was 28. not sure of exact dates   Vaping Use    Vaping Use: Never used   Substance and Sexual Activity    Alcohol use: Yes     Alcohol/week: 6.0 standard drinks of alcohol     Types: 6 Cans of beer per week     Comment: socially  last drink 3/23    Drug use: Yes     Types: Marijuana     Comment: gummies foro nausea with chemo    Sexual activity: Yes     Partners: Female     Birth control/protection: None   Other Topics Concern    None   Social History Narrative    None     Social Determinants of Health     Financial Resource Strain: Not on file   Food Insecurity: No Food Insecurity (2023)    Hunger Vital Sign     Worried About Running Out of Food in the Last Year: Never true     Ran Out of Food in the Last Year: Never true   Transportation Needs: No Transportation Needs (2023)    PRAPARE - Transportation     Lack of Transportation (Medical): No     Lack of Transportation (Non-Medical):  No   Physical Activity: Not on file   Stress: Not on file   Social Connections: Not on file   Intimate Partner Violence: Not on file   Housing Stability: Low Risk  (2023)    Housing Stability Vital Sign     Unable to Pay for Housing in the Last Year: No     Number of Places Lived in the Last Year: 1     Unstable Housing in the Last Year: No       Family History   Problem Relation Age of Onset    Dementia Mother             Breast cancer Sister             Prostate cancer Brother         Received Radiation       No Known Allergies      Current Outpatient Medications:     amLODIPine (NORVASC) 10 mg tablet, Take 1 tablet (10 mg total) by mouth daily, Disp: 90 tablet, Rfl: 0    atorvastatin (LIPITOR) 40 mg tablet, Take 1 tablet (40 mg total) by mouth daily after dinner, Disp: 30 tablet, Rfl: 0    gabapentin (Neurontin) 300 mg capsule, Take 1 capsule (300 mg total) by mouth 3 (three) times a day for 28 days, Disp: 84 capsule, Rfl: 0    losartan (COZAAR) 50 mg tablet, Take 1 tablet (50 mg total) by mouth daily, Disp: 90 tablet, Rfl: 0    docusate sodium (COLACE) 100 mg capsule, Take 1 capsule (100 mg total) by mouth daily Do not start before September 6, 2023. (Patient not taking: Reported on 11/29/2023), Disp: 20 capsule, Rfl: 0    ondansetron (ZOFRAN) 8 mg tablet, Take 1 tablet (8 mg total) by mouth every 8 (eight) hours as needed for nausea or vomiting (Patient not taking: Reported on 10/20/2023), Disp: 20 tablet, Rfl: 3      Physical Exam:  /80 (BP Location: Left arm, Patient Position: Sitting, Cuff Size: Adult)   Pulse 83   Temp 98 °F (36.7 °C)   Resp 18   Ht 5' 9" (1.753 m)   Wt 85.7 kg (189 lb)   SpO2 96%   BMI 27.91 kg/m²     Physical Exam  Constitutional:       Appearance: He is well-developed. HENT:      Head: Normocephalic and atraumatic. Nose: Nose normal.   Eyes:      General: No scleral icterus. Right eye: No discharge. Left eye: No discharge. Conjunctiva/sclera: Conjunctivae normal.      Pupils: Pupils are equal, round, and reactive to light. Neck:      Thyroid: No thyromegaly. Trachea: No tracheal deviation. Cardiovascular:      Rate and Rhythm: Normal rate and regular rhythm. Heart sounds: Normal heart sounds. No murmur heard. No friction rub. Pulmonary:      Effort: Pulmonary effort is normal. No respiratory distress. Breath sounds: Normal breath sounds. No wheezing or rales. Chest:      Chest wall: No tenderness. Abdominal:      General: There is no distension. Palpations: Abdomen is soft. There is no hepatomegaly or splenomegaly. Tenderness: There is no abdominal tenderness. There is no guarding or rebound. Musculoskeletal:         General: No tenderness or deformity. Normal range of motion. Cervical back: Normal range of motion and neck supple. Lymphadenopathy:      Cervical: No cervical adenopathy. Skin:     General: Skin is warm and dry. Coloration: Skin is not pale. Findings: No erythema or rash. Neurological:      Mental Status: He is alert and oriented to person, place, and time. Cranial Nerves: No cranial nerve deficit. Coordination: Coordination normal.      Deep Tendon Reflexes: Reflexes are normal and symmetric. Psychiatric:         Behavior: Behavior normal.         Thought Content: Thought content normal.         Judgment: Judgment normal.           Labs:  Lab Results   Component Value Date    WBC 5.97 11/09/2023    HGB 13.6 11/09/2023    HCT 39.9 11/09/2023    MCV 89 11/09/2023     11/09/2023     Lab Results   Component Value Date     05/10/2018    K 3.5 11/09/2023     11/09/2023    CO2 28 11/09/2023    ANIONGAP 11.6 05/10/2018    BUN 15 11/09/2023    CREATININE 0.78 11/09/2023    GLUCOSE 190 (H) 09/01/2023    GLUF 116 (H) 06/27/2023    CALCIUM 9.4 11/09/2023    AST 26 11/09/2023    ALT 22 11/09/2023    ALKPHOS 79 11/09/2023    PROT 7.2 05/10/2018    BILITOT 0.8 05/10/2018    EGFR 91 11/09/2023     No results found for: "TSH"    Patient voiced understanding and agreement in the above discussion. Aware to contact our office with questions/symptoms in the interim.

## 2023-11-30 DIAGNOSIS — E78.00 HYPERCHOLESTEROLEMIA: ICD-10-CM

## 2023-11-30 RX ORDER — ATORVASTATIN CALCIUM 40 MG/1
40 TABLET, FILM COATED ORAL
Qty: 30 TABLET | Refills: 0 | Status: SHIPPED | OUTPATIENT
Start: 2023-11-30 | End: 2023-12-30

## 2023-12-07 ENCOUNTER — HOSPITAL ENCOUNTER (OUTPATIENT)
Dept: INFUSION CENTER | Facility: HOSPITAL | Age: 68
End: 2023-12-07
Payer: COMMERCIAL

## 2023-12-07 VITALS — TEMPERATURE: 97.5 F

## 2023-12-07 DIAGNOSIS — C34.91 CARCINOMA OF RIGHT LUNG (HCC): ICD-10-CM

## 2023-12-07 DIAGNOSIS — E03.2 DRUG-INDUCED HYPOTHYROIDISM: ICD-10-CM

## 2023-12-07 DIAGNOSIS — Z45.2 ENCOUNTER FOR CENTRAL LINE CARE: ICD-10-CM

## 2023-12-07 DIAGNOSIS — C79.9 METASTATIC ADENOCARCINOMA (HCC): Primary | ICD-10-CM

## 2023-12-07 LAB
ALBUMIN SERPL BCP-MCNC: 4.3 G/DL (ref 3.5–5)
ALP SERPL-CCNC: 74 U/L (ref 34–104)
ALT SERPL W P-5'-P-CCNC: 13 U/L (ref 7–52)
ANION GAP SERPL CALCULATED.3IONS-SCNC: 8 MMOL/L
AST SERPL W P-5'-P-CCNC: 21 U/L (ref 13–39)
BASOPHILS # BLD AUTO: 0.06 THOUSANDS/ÂΜL (ref 0–0.1)
BASOPHILS NFR BLD AUTO: 1 % (ref 0–1)
BILIRUB SERPL-MCNC: 0.79 MG/DL (ref 0.2–1)
BUN SERPL-MCNC: 16 MG/DL (ref 5–25)
CALCIUM SERPL-MCNC: 9 MG/DL (ref 8.4–10.2)
CHLORIDE SERPL-SCNC: 107 MMOL/L (ref 96–108)
CO2 SERPL-SCNC: 26 MMOL/L (ref 21–32)
CREAT SERPL-MCNC: 0.86 MG/DL (ref 0.6–1.3)
EOSINOPHIL # BLD AUTO: 0.26 THOUSAND/ÂΜL (ref 0–0.61)
EOSINOPHIL NFR BLD AUTO: 4 % (ref 0–6)
ERYTHROCYTE [DISTWIDTH] IN BLOOD BY AUTOMATED COUNT: 13 % (ref 11.6–15.1)
GFR SERPL CREATININE-BSD FRML MDRD: 87 ML/MIN/1.73SQ M
GLUCOSE SERPL-MCNC: 188 MG/DL (ref 65–140)
HCT VFR BLD AUTO: 40.7 % (ref 36.5–49.3)
HGB BLD-MCNC: 13.2 G/DL (ref 12–17)
IMM GRANULOCYTES # BLD AUTO: 0.05 THOUSAND/UL (ref 0–0.2)
IMM GRANULOCYTES NFR BLD AUTO: 1 % (ref 0–2)
LYMPHOCYTES # BLD AUTO: 1.5 THOUSANDS/ÂΜL (ref 0.6–4.47)
LYMPHOCYTES NFR BLD AUTO: 25 % (ref 14–44)
MAGNESIUM SERPL-MCNC: 1.8 MG/DL (ref 1.9–2.7)
MCH RBC QN AUTO: 29.5 PG (ref 26.8–34.3)
MCHC RBC AUTO-ENTMCNC: 32.4 G/DL (ref 31.4–37.4)
MCV RBC AUTO: 91 FL (ref 82–98)
MONOCYTES # BLD AUTO: 0.42 THOUSAND/ÂΜL (ref 0.17–1.22)
MONOCYTES NFR BLD AUTO: 7 % (ref 4–12)
NEUTROPHILS # BLD AUTO: 3.84 THOUSANDS/ÂΜL (ref 1.85–7.62)
NEUTS SEG NFR BLD AUTO: 62 % (ref 43–75)
NRBC BLD AUTO-RTO: 0 /100 WBCS
PLATELET # BLD AUTO: 203 THOUSANDS/UL (ref 149–390)
PMV BLD AUTO: 10.2 FL (ref 8.9–12.7)
POTASSIUM SERPL-SCNC: 3.7 MMOL/L (ref 3.5–5.3)
PROT SERPL-MCNC: 7 G/DL (ref 6.4–8.4)
RBC # BLD AUTO: 4.48 MILLION/UL (ref 3.88–5.62)
SODIUM SERPL-SCNC: 141 MMOL/L (ref 135–147)
T3FREE SERPL-MCNC: 4.26 PG/ML (ref 2.5–3.9)
T4 FREE SERPL-MCNC: 1.04 NG/DL (ref 0.61–1.12)
TSH SERPL DL<=0.05 MIU/L-ACNC: 0.16 UIU/ML (ref 0.45–4.5)
WBC # BLD AUTO: 6.13 THOUSAND/UL (ref 4.31–10.16)

## 2023-12-07 PROCEDURE — 84439 ASSAY OF FREE THYROXINE: CPT

## 2023-12-07 PROCEDURE — 83735 ASSAY OF MAGNESIUM: CPT

## 2023-12-07 PROCEDURE — 80053 COMPREHEN METABOLIC PANEL: CPT

## 2023-12-07 PROCEDURE — 84443 ASSAY THYROID STIM HORMONE: CPT

## 2023-12-07 PROCEDURE — 85025 COMPLETE CBC W/AUTO DIFF WBC: CPT

## 2023-12-07 PROCEDURE — 84481 FREE ASSAY (FT-3): CPT | Performed by: INTERNAL MEDICINE

## 2023-12-07 NOTE — PROGRESS NOTES
Central labs drawn via port. Catheter maintenance performed per protocol without incident. Discharged in stable condition and pt aware of next infusion appointment tomorrow at [facility]. AVS declined.

## 2023-12-08 ENCOUNTER — HOSPITAL ENCOUNTER (OUTPATIENT)
Dept: INFUSION CENTER | Facility: HOSPITAL | Age: 68
End: 2023-12-08
Attending: INTERNAL MEDICINE
Payer: COMMERCIAL

## 2023-12-08 VITALS
HEART RATE: 70 BPM | TEMPERATURE: 96.9 F | SYSTOLIC BLOOD PRESSURE: 154 MMHG | OXYGEN SATURATION: 96 % | DIASTOLIC BLOOD PRESSURE: 84 MMHG | RESPIRATION RATE: 18 BRPM

## 2023-12-08 DIAGNOSIS — D70.1 CHEMOTHERAPY-INDUCED NEUTROPENIA: ICD-10-CM

## 2023-12-08 DIAGNOSIS — C79.9 METASTATIC ADENOCARCINOMA (HCC): ICD-10-CM

## 2023-12-08 DIAGNOSIS — C34.91 CARCINOMA OF RIGHT LUNG (HCC): Primary | ICD-10-CM

## 2023-12-08 DIAGNOSIS — T45.1X5A CHEMOTHERAPY-INDUCED NEUTROPENIA: ICD-10-CM

## 2023-12-08 PROCEDURE — 96413 CHEMO IV INFUSION 1 HR: CPT

## 2023-12-08 RX ORDER — SODIUM CHLORIDE 9 MG/ML
20 INJECTION, SOLUTION INTRAVENOUS ONCE
Status: COMPLETED | OUTPATIENT
Start: 2023-12-08 | End: 2023-12-08

## 2023-12-08 RX ADMIN — SODIUM CHLORIDE 480 MG: 9 INJECTION, SOLUTION INTRAVENOUS at 09:39

## 2023-12-08 RX ADMIN — SODIUM CHLORIDE 20 ML/HR: 0.9 INJECTION, SOLUTION INTRAVENOUS at 09:25

## 2023-12-08 NOTE — PROGRESS NOTES
Recent labs reviewed. Patient tolerated Opdivo treatment without reaction or issues. AVS given to patient. Patient is aware of their next appointment on 1/4/24 at 0930 for labs and 1/5/24 at 0930 for treatment. Patient ambulated off unit without incident. All personal belongings taken with patient.

## 2023-12-13 ENCOUNTER — TELEPHONE (OUTPATIENT)
Dept: HEMATOLOGY ONCOLOGY | Facility: CLINIC | Age: 68
End: 2023-12-13

## 2023-12-13 DIAGNOSIS — G89.12 ACUTE POST-THORACOTOMY PAIN: ICD-10-CM

## 2023-12-13 RX ORDER — GABAPENTIN 300 MG/1
300 CAPSULE ORAL 3 TIMES DAILY
Qty: 90 CAPSULE | Refills: 1 | Status: SHIPPED | OUTPATIENT
Start: 2023-12-13 | End: 2024-03-12

## 2023-12-13 NOTE — TELEPHONE ENCOUNTER
Medication Refill Request   Who are you speaking with? Patient   If it is not the patient, are they listed on an active communication consent form? N/A   Which medication is being requested for refill? Please list medication name and dosage   gabapentin (Neurontin) 300 mg capsule     How many pills does the patient have left? 0   Preferred Pharmacy / Address  [facility], [address] [place] [address]    Who is the prescribing provider?  Suri Boyce PA-C   Call back number  [phone]   Relevant Information

## 2023-12-18 DIAGNOSIS — Z76.0 MEDICATION REFILL: ICD-10-CM

## 2023-12-18 DIAGNOSIS — I10 HYPERTENSION: ICD-10-CM

## 2023-12-18 RX ORDER — LOSARTAN POTASSIUM 50 MG/1
50 TABLET ORAL DAILY
Qty: 90 TABLET | Refills: 0 | Status: SHIPPED | OUTPATIENT
Start: 2023-12-18

## 2024-01-04 ENCOUNTER — HOSPITAL ENCOUNTER (OUTPATIENT)
Dept: INFUSION CENTER | Facility: HOSPITAL | Age: 69
End: 2024-01-04
Payer: COMMERCIAL

## 2024-01-04 VITALS — TEMPERATURE: 97.7 F

## 2024-01-04 DIAGNOSIS — D70.1 CHEMOTHERAPY-INDUCED NEUTROPENIA: ICD-10-CM

## 2024-01-04 DIAGNOSIS — T45.1X5A CHEMOTHERAPY-INDUCED NEUTROPENIA: ICD-10-CM

## 2024-01-04 DIAGNOSIS — C34.91 CARCINOMA OF RIGHT LUNG (HCC): ICD-10-CM

## 2024-01-04 DIAGNOSIS — C79.9 METASTATIC ADENOCARCINOMA (HCC): ICD-10-CM

## 2024-01-04 DIAGNOSIS — Z45.2 ENCOUNTER FOR CENTRAL LINE CARE: Primary | ICD-10-CM

## 2024-01-04 LAB
ALBUMIN SERPL BCP-MCNC: 4.3 G/DL (ref 3.5–5)
ALP SERPL-CCNC: 81 U/L (ref 34–104)
ALT SERPL W P-5'-P-CCNC: 12 U/L (ref 7–52)
ANION GAP SERPL CALCULATED.3IONS-SCNC: 8 MMOL/L
AST SERPL W P-5'-P-CCNC: 17 U/L (ref 13–39)
BASOPHILS # BLD AUTO: 0.08 THOUSANDS/ÂΜL (ref 0–0.1)
BASOPHILS NFR BLD AUTO: 1 % (ref 0–1)
BILIRUB SERPL-MCNC: 0.74 MG/DL (ref 0.2–1)
BUN SERPL-MCNC: 11 MG/DL (ref 5–25)
CALCIUM SERPL-MCNC: 9 MG/DL (ref 8.4–10.2)
CHLORIDE SERPL-SCNC: 105 MMOL/L (ref 96–108)
CO2 SERPL-SCNC: 28 MMOL/L (ref 21–32)
CREAT SERPL-MCNC: 0.81 MG/DL (ref 0.6–1.3)
EOSINOPHIL # BLD AUTO: 0.36 THOUSAND/ÂΜL (ref 0–0.61)
EOSINOPHIL NFR BLD AUTO: 6 % (ref 0–6)
ERYTHROCYTE [DISTWIDTH] IN BLOOD BY AUTOMATED COUNT: 12.3 % (ref 11.6–15.1)
GFR SERPL CREATININE-BSD FRML MDRD: 90 ML/MIN/1.73SQ M
GLUCOSE SERPL-MCNC: 107 MG/DL (ref 65–140)
HCT VFR BLD AUTO: 40.8 % (ref 36.5–49.3)
HGB BLD-MCNC: 13.8 G/DL (ref 12–17)
IMM GRANULOCYTES # BLD AUTO: 0.06 THOUSAND/UL (ref 0–0.2)
IMM GRANULOCYTES NFR BLD AUTO: 1 % (ref 0–2)
LYMPHOCYTES # BLD AUTO: 1.37 THOUSANDS/ÂΜL (ref 0.6–4.47)
LYMPHOCYTES NFR BLD AUTO: 22 % (ref 14–44)
MAGNESIUM SERPL-MCNC: 2 MG/DL (ref 1.9–2.7)
MCH RBC QN AUTO: 30.7 PG (ref 26.8–34.3)
MCHC RBC AUTO-ENTMCNC: 33.8 G/DL (ref 31.4–37.4)
MCV RBC AUTO: 91 FL (ref 82–98)
MONOCYTES # BLD AUTO: 0.52 THOUSAND/ÂΜL (ref 0.17–1.22)
MONOCYTES NFR BLD AUTO: 8 % (ref 4–12)
NEUTROPHILS # BLD AUTO: 3.88 THOUSANDS/ÂΜL (ref 1.85–7.62)
NEUTS SEG NFR BLD AUTO: 62 % (ref 43–75)
NRBC BLD AUTO-RTO: 0 /100 WBCS
PLATELET # BLD AUTO: 180 THOUSANDS/UL (ref 149–390)
PMV BLD AUTO: 10.3 FL (ref 8.9–12.7)
POTASSIUM SERPL-SCNC: 4 MMOL/L (ref 3.5–5.3)
PROT SERPL-MCNC: 7.1 G/DL (ref 6.4–8.4)
RBC # BLD AUTO: 4.49 MILLION/UL (ref 3.88–5.62)
SODIUM SERPL-SCNC: 141 MMOL/L (ref 135–147)
T3FREE SERPL-MCNC: 4.39 PG/ML (ref 2.5–3.9)
T4 FREE SERPL-MCNC: 1.24 NG/DL (ref 0.61–1.12)
TSH SERPL DL<=0.05 MIU/L-ACNC: <0.01 UIU/ML (ref 0.45–4.5)
WBC # BLD AUTO: 6.27 THOUSAND/UL (ref 4.31–10.16)

## 2024-01-04 PROCEDURE — 84439 ASSAY OF FREE THYROXINE: CPT | Performed by: INTERNAL MEDICINE

## 2024-01-04 PROCEDURE — 84481 FREE ASSAY (FT-3): CPT | Performed by: INTERNAL MEDICINE

## 2024-01-04 PROCEDURE — 85025 COMPLETE CBC W/AUTO DIFF WBC: CPT

## 2024-01-04 PROCEDURE — 83735 ASSAY OF MAGNESIUM: CPT

## 2024-01-04 PROCEDURE — 80053 COMPREHEN METABOLIC PANEL: CPT | Performed by: INTERNAL MEDICINE

## 2024-01-04 PROCEDURE — 84443 ASSAY THYROID STIM HORMONE: CPT | Performed by: INTERNAL MEDICINE

## 2024-01-04 NOTE — PROGRESS NOTES
Port flushed freely.  Good blood return noted.  Labs drawn. Port deaccessed without issue.  Patient tolerated well.  AVS given to patient.  Patient is aware of their appointment at 9 am tomorrow. .  Patient ambulated off unit without incident.  All personal belongings taken with patient.

## 2024-01-05 ENCOUNTER — HOSPITAL ENCOUNTER (OUTPATIENT)
Dept: INFUSION CENTER | Facility: HOSPITAL | Age: 69
End: 2024-01-05
Attending: INTERNAL MEDICINE
Payer: COMMERCIAL

## 2024-01-05 VITALS
RESPIRATION RATE: 18 BRPM | TEMPERATURE: 97 F | DIASTOLIC BLOOD PRESSURE: 74 MMHG | HEART RATE: 77 BPM | OXYGEN SATURATION: 98 % | SYSTOLIC BLOOD PRESSURE: 141 MMHG

## 2024-01-05 DIAGNOSIS — C34.91 CARCINOMA OF RIGHT LUNG (HCC): Primary | ICD-10-CM

## 2024-01-05 DIAGNOSIS — D70.1 CHEMOTHERAPY-INDUCED NEUTROPENIA: ICD-10-CM

## 2024-01-05 DIAGNOSIS — T45.1X5A CHEMOTHERAPY-INDUCED NEUTROPENIA: ICD-10-CM

## 2024-01-05 DIAGNOSIS — C79.9 METASTATIC ADENOCARCINOMA (HCC): ICD-10-CM

## 2024-01-05 PROCEDURE — 96413 CHEMO IV INFUSION 1 HR: CPT

## 2024-01-05 RX ORDER — SODIUM CHLORIDE 9 MG/ML
20 INJECTION, SOLUTION INTRAVENOUS ONCE
Status: COMPLETED | OUTPATIENT
Start: 2024-01-05 | End: 2024-01-05

## 2024-01-05 RX ADMIN — SODIUM CHLORIDE 480 MG: 9 INJECTION, SOLUTION INTRAVENOUS at 10:53

## 2024-01-05 RX ADMIN — SODIUM CHLORIDE 20 ML/HR: 0.9 INJECTION, SOLUTION INTRAVENOUS at 10:30

## 2024-01-05 NOTE — PROGRESS NOTES
Paras Pitts  tolerated treatment well with no complications.      Paras Pitts is aware of future appt on 2/1 at 12 pm.     AVS printed and given to Paras Pitts.

## 2024-01-05 NOTE — PROGRESS NOTES
Shanell Moreira Rn made aware of patients T3, T4 and TSH results. OK to treat with Opdivo per Dr Langford.

## 2024-01-18 ENCOUNTER — HOSPITAL ENCOUNTER (OUTPATIENT)
Dept: MRI IMAGING | Facility: HOSPITAL | Age: 69
End: 2024-01-18
Payer: COMMERCIAL

## 2024-01-18 DIAGNOSIS — C79.31 METASTASIS TO BRAIN (HCC): ICD-10-CM

## 2024-01-18 PROCEDURE — 70553 MRI BRAIN STEM W/O & W/DYE: CPT

## 2024-01-18 PROCEDURE — A9585 GADOBUTROL INJECTION: HCPCS | Performed by: STUDENT IN AN ORGANIZED HEALTH CARE EDUCATION/TRAINING PROGRAM

## 2024-01-18 PROCEDURE — G1004 CDSM NDSC: HCPCS

## 2024-01-18 RX ORDER — GADOBUTROL 604.72 MG/ML
10 INJECTION INTRAVENOUS
Status: COMPLETED | OUTPATIENT
Start: 2024-01-18 | End: 2024-01-18

## 2024-01-18 RX ADMIN — GADOBUTROL 10 ML: 604.72 INJECTION INTRAVENOUS at 11:47

## 2024-01-23 DIAGNOSIS — E78.00 HYPERCHOLESTEROLEMIA: ICD-10-CM

## 2024-01-23 RX ORDER — ATORVASTATIN CALCIUM 40 MG/1
40 TABLET, FILM COATED ORAL
Qty: 30 TABLET | Refills: 6 | Status: SHIPPED | OUTPATIENT
Start: 2024-01-23 | End: 2024-02-22

## 2024-01-24 ENCOUNTER — RADIATION ONCOLOGY FOLLOW-UP (OUTPATIENT)
Dept: RADIATION ONCOLOGY | Facility: HOSPITAL | Age: 69
End: 2024-01-24
Attending: INTERNAL MEDICINE
Payer: COMMERCIAL

## 2024-01-24 ENCOUNTER — CLINICAL SUPPORT (OUTPATIENT)
Dept: RADIATION ONCOLOGY | Facility: HOSPITAL | Age: 69
End: 2024-01-24
Attending: STUDENT IN AN ORGANIZED HEALTH CARE EDUCATION/TRAINING PROGRAM
Payer: COMMERCIAL

## 2024-01-24 VITALS
OXYGEN SATURATION: 99 % | WEIGHT: 190 LBS | TEMPERATURE: 98.2 F | DIASTOLIC BLOOD PRESSURE: 82 MMHG | RESPIRATION RATE: 16 BRPM | BODY MASS INDEX: 28.06 KG/M2 | SYSTOLIC BLOOD PRESSURE: 155 MMHG | HEART RATE: 78 BPM

## 2024-01-24 DIAGNOSIS — C79.31 METASTASIS TO BRAIN (HCC): Primary | ICD-10-CM

## 2024-01-24 PROCEDURE — 99213 OFFICE O/P EST LOW 20 MIN: CPT | Performed by: STUDENT IN AN ORGANIZED HEALTH CARE EDUCATION/TRAINING PROGRAM

## 2024-01-24 PROCEDURE — 99211 OFF/OP EST MAY X REQ PHY/QHP: CPT | Performed by: STUDENT IN AN ORGANIZED HEALTH CARE EDUCATION/TRAINING PROGRAM

## 2024-01-24 PROCEDURE — G0463 HOSPITAL OUTPT CLINIC VISIT: HCPCS | Performed by: STUDENT IN AN ORGANIZED HEALTH CARE EDUCATION/TRAINING PROGRAM

## 2024-01-24 RX ORDER — PANTOPRAZOLE SODIUM 40 MG/1
40 TABLET, DELAYED RELEASE ORAL DAILY
Qty: 60 TABLET | Refills: 0 | Status: SHIPPED | OUTPATIENT
Start: 2024-01-24

## 2024-01-24 RX ORDER — DEXAMETHASONE 1 MG
TABLET ORAL
Qty: 7 TABLET | Refills: 0 | Status: SHIPPED | OUTPATIENT
Start: 2024-02-28

## 2024-01-24 RX ORDER — DEXAMETHASONE 2 MG/1
TABLET ORAL 2 TIMES DAILY WITH MEALS
Qty: 98 TABLET | Refills: 0 | Status: SHIPPED | OUTPATIENT
Start: 2024-01-24 | End: 2024-02-28

## 2024-01-24 NOTE — PROGRESS NOTES
Paras Pitts 1955 is a 68 y.o. male 68 year old man with Stage JAY ([id number]) lung adenocarcinoma s/p resection of a right frontal metastasis. On 4/28/23 he completed a course of postoperative SRT to a dose of 3000cGy in 5 fractions. He thereafter underwent neoadjuvant chemoimmunotherapy (carbo/taxol/nivo from 5/18/23-7/20/23) followed by right thoracotomy with RUL lobectomy and extensive mediastinal LN dissection. He was not recommended for adjuvant RT. He was last seen on 10/18/2023 and presents for 3 month follow up and MRI review.      11/29/2023 HemOnc: started nivolumab 480 mg on every 4-week basis on 10/13/2023. Tolerating well.       1/18/2024 MRI brain: -Status post right parafalcine frontal mass resection and stereotactic radiosurgery. Interval increase in enhancement and surrounding vasogenic edema around the resection cavity with new area of enhancement along the inferior margin. No definite   increased signal on ASL however there is questionable area of increased cerebral blood volume along the superior margin. Differential includes radiation necrosis/treatment related changes versus recurrent disease. Recommend short interval follow-up.    3/5/2024 CT       Follow up visit     Oncology History Overview Note   68 year old man with Stage JAY ([id number]) lung adenocarcinoma s/p resection of a right frontal metastasis. On 4/28/23 he completed a course of postoperative SRT to a dose of 3000cGy in 5 fractions. He thereafter underwent neoadjuvant chemoimmunotherapy (carbo/taxol/nivo from 5/18/23-7/20/23) followed by right thoracotomy with RUL lobectomy and extensive mediastinal LN dissection. He was not recommended for adjuvant RT. He was last seen on 10/18/2023 and presents for 3 month follow up and MRI review.      11/29/2023 HemOnc: started nivolumab 480 mg on every 4-week basis on 10/13/2023. Tolerating well.       1/18/2024 MRI brain: -Status post right parafalcine frontal mass resection and  stereotactic radiosurgery. Interval increase in enhancement and surrounding vasogenic edema around the resection cavity with new area of enhancement along the inferior margin. No definite   increased signal on ASL however there is questionable area of increased cerebral blood volume along the superior margin. Differential includes radiation necrosis/treatment related changes versus recurrent disease. Recommend short interval follow-up.    3/5/2024 CT        Metastatic adenocarcinoma (HCC)   2023 Initial Diagnosis    Metastatic adenocarcinoma (HCC)     3/23/2023 Biopsy    Brain, right frontal mass (biopsy):  - Metastatic non-small cell carcinoma     Comment:  - Tumor cells stain diffusely for CAM5.2, CKAE1/3, CK7, TTF1 and NapsinA with absent CK20, p63, CK5/6, p40 expression.  This immunopanel favors a metastatic lung adenocarcinoma.       5/18/2023 - 7/20/2023 Chemotherapy    cyanocobalamin, 1,000 mcg, Intramuscular, Once, 3 of 3 cycles  Administration: 1,000 mcg (6/29/2023), 1,000 mcg (5/18/2023), 1,000 mcg (7/20/2023)  alteplase (CATHFLO), 2 mg, Intracatheter, Every 1 Minute as needed, 4 of 4 cycles  pegfilgrastim (NEULASTA ONPRO), 6 mg, Subcutaneous, Once, 3 of 3 cycles  Administration: 6 mg (6/8/2023), 6 mg (6/29/2023), 6 mg (7/20/2023)  fosaprepitant (EMEND) IVPB, 150 mg, Intravenous, Once, 4 of 4 cycles  Administration: 150 mg (5/18/2023), 150 mg (6/29/2023), 150 mg (7/20/2023), 150 mg (6/8/2023)  nivolumab (OPDIVO) IVPB, 360 mg (150 % of original dose 240 mg), Intravenous, Once, 4 of 4 cycles  Dose modification: 360 mg (original dose 240 mg, Cycle 1, Reason: Dose modified as per discussion with consulting physician)  Administration: 360 mg (5/18/2023), 360 mg (6/8/2023), 360 mg (6/29/2023), 360 mg (7/20/2023)  CARBOplatin (PARAPLATIN) IVPB (GOG AUC DOSING), 642.5 mg, Intravenous, Once, 4 of 4 cycles  Administration: 642.5 mg (5/18/2023), 642.5 mg (6/29/2023), 566.5 mg (7/20/2023), 650 mg  (6/8/2023)  pemetrexed (ALIMTA) chemo infusion, 980 mg, Intravenous, Once, 4 of 4 cycles  Administration: 1,000 mg (5/18/2023), 1,000 mg (6/29/2023), 1,000 mg (7/20/2023), 1,000 mg (6/8/2023)     10/11/2023 - 10/11/2023 Chemotherapy    alteplase (CATHFLO), 2 mg, Intracatheter, Every 1 Minute as needed, 0 of 6 cycles  nivolumab (OPDIVO) IVPB, 240 mg, Intravenous, Once, 0 of 6 cycles     10/13/2023 -  Chemotherapy    alteplase (CATHFLO), 2 mg, Intracatheter, Every 1 Minute as needed, 4 of 7 cycles  nivolumab (OPDIVO) IVPB, 480 mg, Intravenous, Once, 4 of 7 cycles  Administration: 480 mg (10/13/2023), 480 mg (11/10/2023), 480 mg (12/8/2023), 480 mg (1/5/2024)     Carcinoma of right lung (HCC)   4/13/2023 Initial Diagnosis    Carcinoma of right lung (HCC)     4/13/2023 -  Cancer Staged    Staging form: Lung, AJCC 8th Edition  - Clinical: Stage JAY (cT3, cN1, cM1b) - Signed by James Contreras MD on 4/13/2023 4/19/2023 - 4/28/2023 Radiation    Plan ID Energy Fractions Dose per Fraction (cGy) Dose Correction (cGy) Total Dose Delivered (cGy) Elapsed Days   SRT R Frontal 6X-FFF 5 / 5 600 0 3,000 9         5/18/2023 - 7/20/2023 Chemotherapy    cyanocobalamin, 1,000 mcg, Intramuscular, Once, 3 of 3 cycles  Administration: 1,000 mcg (6/29/2023), 1,000 mcg (5/18/2023), 1,000 mcg (7/20/2023)  alteplase (CATHFLO), 2 mg, Intracatheter, Every 1 Minute as needed, 4 of 4 cycles  pegfilgrastim (NEULASTA ONPRO), 6 mg, Subcutaneous, Once, 3 of 3 cycles  Administration: 6 mg (6/8/2023), 6 mg (6/29/2023), 6 mg (7/20/2023)  fosaprepitant (EMEND) IVPB, 150 mg, Intravenous, Once, 4 of 4 cycles  Administration: 150 mg (5/18/2023), 150 mg (6/29/2023), 150 mg (7/20/2023), 150 mg (6/8/2023)  nivolumab (OPDIVO) IVPB, 360 mg (150 % of original dose 240 mg), Intravenous, Once, 4 of 4 cycles  Dose modification: 360 mg (original dose 240 mg, Cycle 1, Reason: Dose modified as per discussion with consulting physician)  Administration: 360 mg  (5/18/2023), 360 mg (6/8/2023), 360 mg (6/29/2023), 360 mg (7/20/2023)  CARBOplatin (PARAPLATIN) IVPB (GOG AUC DOSING), 642.5 mg, Intravenous, Once, 4 of 4 cycles  Administration: 642.5 mg (5/18/2023), 642.5 mg (6/29/2023), 566.5 mg (7/20/2023), 650 mg (6/8/2023)  pemetrexed (ALIMTA) chemo infusion, 980 mg, Intravenous, Once, 4 of 4 cycles  Administration: 1,000 mg (5/18/2023), 1,000 mg (6/29/2023), 1,000 mg (7/20/2023), 1,000 mg (6/8/2023)     9/1/2023 -  Cancer Staged    Staging form: Lung, AJCC 8th Edition  - Pathologic stage from 9/1/2023: Stage JAY (pT2b, pN2, cM1b) - Signed by Treva Bah PA-C on 9/20/2023  Histopathologic type: Adenocarcinoma, NOS  Stage prefix: Initial diagnosis  Histologic grade (G): G3  Histologic grading system: 4 grade system       9/1/2023 Surgery    Right robotic converted to open upper lobectomy      9/1/2023 Biopsy    A.  Lung, right upper lobe:     - Invasive poorly differentiated solid adenocarcinoma of the lung, 4.4 cm.     - Tumor invades into, but not through, the visceral pleura (PL1), confirmed by special VVG stains.     - Lymphatic channel invasion by tumor identified.     - Metastatic carcinoma present in three of thirteen lymph nodes (3/13).       -- Rare fibrotic granulomas present.     - All margins are negative for tumor.     - Emphysematous changes.     B.  Lymph node, level 8:     - Negative for malignancy (0/1).     C.  Lymph node, level 7, #1:     - Negative for malignancy (0/1).     D.  Lymph node, level 4R:     - Negative for malignancy (0/1).     E.  Lymph node, level 4R, #2:     - Fibrovascular adipose tissue; negative for malignancy.     - No lymphoid tissue identified.     F.  Lymph node, level 2R, #1:     - Metastatic carcinoma present in one of three lymph nodes (1/3).     G.  Lymph node, level 2R, #2:     - Negative for malignancy (0/1).     H.  Lymph node, level 11 posterior:     - Single lymph node positive for metastatic carcinoma (1/1).     I.  Lymph  node, portion of level 12 on artery:     - Negative for malignancy (0/1).     - Non-caseating granulomatous inflammation present.        -- Special stains for acid fast bacilli and fungal organisms are negative.        10/11/2023 - 10/11/2023 Chemotherapy    alteplase (CATHFLO), 2 mg, Intracatheter, Every 1 Minute as needed, 0 of 6 cycles  nivolumab (OPDIVO) IVPB, 240 mg, Intravenous, Once, 0 of 6 cycles     10/13/2023 -  Chemotherapy    alteplase (CATHFLO), 2 mg, Intracatheter, Every 1 Minute as needed, 4 of 7 cycles  nivolumab (OPDIVO) IVPB, 480 mg, Intravenous, Once, 4 of 7 cycles  Administration: 480 mg (10/13/2023), 480 mg (11/10/2023), 480 mg (12/8/2023), 480 mg (1/5/2024)         Review of Systems:  Review of Systems   Constitutional: Negative.    HENT: Negative.     Eyes:         Watery eyes with crusting - since starting immunotherapy   Respiratory:  Positive for cough and shortness of breath.    Gastrointestinal: Negative.    Endocrine: Negative.    Genitourinary: Negative.    Musculoskeletal:  Positive for gait problem (since leg weakness this week).        Surgical pain slowly improving. Incision well healed, per pt.   Skin: Negative.    Neurological:  Positive for weakness (left sided weakness started about a week ago. arms and legs.) and light-headedness (at baseline).   Psychiatric/Behavioral: Negative.         Clinical Trial: no    IPSS Questionnaire (AUA-7):      Teaching    Health Maintenance   Topic Date Due    Hepatitis A Vaccine (1 of 2 - Risk 2-dose series) Never done    COVID-19 Vaccine (3 - Pfizer risk series) 05/14/2021    Fall Risk  04/18/2024    Depression Screening  10/20/2024    BMI: Followup Plan  10/20/2024    Annual Physical  10/20/2024    BMI: Adult  11/29/2024    DTaP,Tdap,and Td Vaccines (2 - Td or Tdap) 04/26/2028    Colorectal Cancer Screening  08/10/2031    Hepatitis C Screening  Completed    Zoster Vaccine  Completed    Pneumococcal Vaccine: 65+ Years  Completed    Influenza  Vaccine  Completed    HIB Vaccine  Aged Out    IPV Vaccine  Aged Out    Meningococcal ACWY Vaccine  Aged Out    HPV Vaccine  Aged Out     Patient Active Problem List   Diagnosis    Negative depression screening    Overweight (BMI 25.0-29.9)    Essential hypertension    Annual physical exam    Hypercholesterolemia    Lung nodule    Metastatic adenocarcinoma (HCC)    Carcinoma of right lung (HCC)    Encounter for central line care    Chemotherapy-induced neutropenia     History of CVA (cerebrovascular accident)     Past Medical History:   Diagnosis Date    Brain compression (HCC) 03/20/2023    Brain mass 03/20/2023    Cancer (HCC) 2001    Receint lung and brain lesions and prostate cancer in 2001    Cerebral edema (HCC) 03/20/2023    CVA (cerebral vascular accident) (HCC) 08/12/2021    Hypertension     Prostate cancer (HCC) 2001    Rectal bleeding     Stroke (HCC)     2011       Past Surgical History:   Procedure Laterality Date    COLONOSCOPY      CRANIOTOMY Right 3/23/2023    Procedure: Right frontal CRANIOTOMY IMAGE-GUIDED FOR TUMOR;  Surgeon: Clark Carrillo MD;  Location: [facility];  Service: Neurosurgery    IR PORT PLACEMENT  4/27/2023    SC BRNCHSC INCL FLUOR GDNCE DX W/CELL WASHG SPX N/A 9/1/2023    Procedure: BRONCHOSCOPY FLEXIBLE;  Surgeon: Kalia Sparrow MD;  Location: [facility];  Service: Thoracic    SC CYSTOURETHROSCOPY N/A 3/23/2023    Procedure: EUA, DEL CASTILLO INSERTION;  Surgeon: Ajay Piedra MD;  Location: [facility];  Service: Urology    SC THORACOSCOPY W/LOBECTOMY SINGLE LOBE Right 9/1/2023    Procedure: robotic assisted converted to open right upper lobectomy, lymph node dissection;  Surgeon: Kalia Sparrow MD;  Location: [facility];  Service: Thoracic    PROSTATECTOMY  2001    THORACOTOMY Right 9/1/2023    Procedure: right thoracotomy with Cryo-Ablation;  Surgeon: Kalia Sparrow MD;  Location: [facility];  Service: Thoracic    TONSILLECTOMY       Family History   Problem Relation Age of  Onset    Dementia Mother             Breast cancer Sister             Prostate cancer Brother         Received Radiation     Social History     Socioeconomic History    Marital status: /Civil Union     Spouse name: Not on file    Number of children: Not on file    Years of education: Not on file    Highest education level: Not on file   Occupational History    Not on file   Tobacco Use    Smoking status: Former     Current packs/day: 0.00     Average packs/day: 1 pack/day for 15.0 years (15.0 ttl pk-yrs)     Types: Cigarettes     Start date:      Quit date:      Years since quittin.0     Passive exposure: Never    Smokeless tobacco: Never    Tobacco comments:     i quit when i was 28. not sure of exact dates   Vaping Use    Vaping status: Never Used   Substance and Sexual Activity    Alcohol use: Yes     Alcohol/week: 6.0 standard drinks of alcohol     Types: 6 Cans of beer per week     Comment: socially  last drink 3/23    Drug use: Yes     Types: Marijuana     Comment: gummies foro nausea with chemo    Sexual activity: Yes     Partners: Female     Birth control/protection: None   Other Topics Concern    Not on file   Social History Narrative    Not on file     Social Determinants of Health     Financial Resource Strain: Not on file   Food Insecurity: No Food Insecurity (2023)    Hunger Vital Sign     Worried About Running Out of Food in the Last Year: Never true     Ran Out of Food in the Last Year: Never true   Transportation Needs: No Transportation Needs (2023)    PRAPARE - Transportation     Lack of Transportation (Medical): No     Lack of Transportation (Non-Medical): No   Physical Activity: Not on file   Stress: Not on file   Social Connections: Not on file   Intimate Partner Violence: Not on file   Housing Stability: Low Risk  (2023)    Housing Stability Vital Sign     Unable to Pay for Housing in the Last Year: No     Number of Places Lived in the Last Year:  1     Unstable Housing in the Last Year: No       Current Outpatient Medications:     amLODIPine (NORVASC) 10 mg tablet, Take 1 tablet (10 mg total) by mouth daily, Disp: 90 tablet, Rfl: 0    atorvastatin (LIPITOR) 40 mg tablet, Take 1 tablet (40 mg total) by mouth daily after dinner, Disp: 30 tablet, Rfl: 6    gabapentin (Neurontin) 300 mg capsule, Take 1 capsule (300 mg total) by mouth 3 (three) times a day, Disp: 90 capsule, Rfl: 1    losartan (COZAAR) 50 mg tablet, Take 1 tablet (50 mg total) by mouth daily, Disp: 90 tablet, Rfl: 0    docusate sodium (COLACE) 100 mg capsule, Take 1 capsule (100 mg total) by mouth daily Do not start before September 6, 2023. (Patient not taking: Reported on 11/29/2023), Disp: 20 capsule, Rfl: 0    ondansetron (ZOFRAN) 8 mg tablet, Take 1 tablet (8 mg total) by mouth every 8 (eight) hours as needed for nausea or vomiting (Patient not taking: Reported on 10/20/2023), Disp: 20 tablet, Rfl: 3  No Known Allergies  Vitals:    01/24/24 0932   BP: 155/82   Pulse: 78   Resp: 16   Temp: 98.2 °F (36.8 °C)   SpO2: 99%   Weight: 86.2 kg (190 lb)      Pain Score: 0-No pain

## 2024-01-25 ENCOUNTER — TELEPHONE (OUTPATIENT)
Dept: HEMATOLOGY ONCOLOGY | Facility: CLINIC | Age: 69
End: 2024-01-25

## 2024-01-25 ENCOUNTER — OFFICE VISIT (OUTPATIENT)
Dept: HEMATOLOGY ONCOLOGY | Facility: CLINIC | Age: 69
End: 2024-01-25
Payer: COMMERCIAL

## 2024-01-25 VITALS
WEIGHT: 187 LBS | TEMPERATURE: 98.6 F | HEIGHT: 69 IN | HEART RATE: 90 BPM | SYSTOLIC BLOOD PRESSURE: 146 MMHG | DIASTOLIC BLOOD PRESSURE: 80 MMHG | RESPIRATION RATE: 16 BRPM | BODY MASS INDEX: 27.7 KG/M2 | OXYGEN SATURATION: 97 %

## 2024-01-25 DIAGNOSIS — C79.31 METASTASIS TO BRAIN (HCC): ICD-10-CM

## 2024-01-25 DIAGNOSIS — T45.1X5A CHEMOTHERAPY-INDUCED NEUTROPENIA: ICD-10-CM

## 2024-01-25 DIAGNOSIS — C34.91 CARCINOMA OF RIGHT LUNG (HCC): Primary | ICD-10-CM

## 2024-01-25 DIAGNOSIS — C34.91 CARCINOMA OF RIGHT LUNG (HCC): ICD-10-CM

## 2024-01-25 DIAGNOSIS — C79.9 METASTATIC ADENOCARCINOMA (HCC): Primary | ICD-10-CM

## 2024-01-25 DIAGNOSIS — E03.2 DRUG-INDUCED HYPOTHYROIDISM: ICD-10-CM

## 2024-01-25 DIAGNOSIS — D70.1 CHEMOTHERAPY-INDUCED NEUTROPENIA: ICD-10-CM

## 2024-01-25 PROCEDURE — 99214 OFFICE O/P EST MOD 30 MIN: CPT | Performed by: INTERNAL MEDICINE

## 2024-01-25 NOTE — PROGRESS NOTES
Follow-up - Radiation Oncology   Paras Pitts 1955 68 y.o. male [id number]      History of Present Illness   Cancer Staging   Carcinoma of right lung (HCC)  Staging form: Lung, AJCC 8th Edition  - Clinical: Stage JAY (cT3, cN1, cM1b) - Signed by James Contreras MD on 4/13/2023  - Pathologic stage from 9/1/2023: Stage JAY (pT2b, pN2, cM1b) - Signed by Treva Bah PA-C on 9/20/2023  Histopathologic type: Adenocarcinoma, NOS  Stage prefix: Initial diagnosis  Histologic grade (G): G3  Histologic grading system: 4 grade system      Mr. Paras Pitts is a 68 year old man with Stage JAY ([id number]) lung adenocarcinoma s/p resection of a right frontal metastasis. On 4/28/23 he completed a course of postoperative SRT to a dose of 3000cGy in 5 fractions. He was last seen in clinic on 7/5/23. He thereafter underwent neoadjuvant chemoimmunotherapy (carbo/taxol/nivo from 5/18/23-7/20/23) followed by right thoracotomy with RUL lobectomy and extensive mediastinal LN dissection. He was not recommended for adjuvant RT. He returns today for follow-up.      Interval History:  The patient was last seen in clinic on 10/18/23. Since then he has continued to follow with Dr. Langford receiving maintenance nivolumab.     MRI Brain (1/18/24) demonstrated:  Status post right parafalcine frontal mass resection and stereotactic radiosurgery. Interval increase in enhancement and surrounding vasogenic edema around the resection cavity with new area of enhancement along the inferior margin. No definite increased signal on ASL however there is questionable area of increased cerebral blood volume along the superior margin. Differential includes radiation necrosis/treatment related changes versus recurrent disease. Recommend short interval follow-up.    Currently the patient is doing fair overall. Starting ~1 week ago he began to develop left sided weakness in his arm and leg.     Historical Information   Oncology History   Metastatic  adenocarcinoma (HCC)   2023 Initial Diagnosis    Metastatic adenocarcinoma (HCC)     3/23/2023 Biopsy    Brain, right frontal mass (biopsy):  - Metastatic non-small cell carcinoma     Comment:  - Tumor cells stain diffusely for CAM5.2, CKAE1/3, CK7, TTF1 and NapsinA with absent CK20, p63, CK5/6, p40 expression.  This immunopanel favors a metastatic lung adenocarcinoma.       5/18/2023 - 7/20/2023 Chemotherapy    cyanocobalamin, 1,000 mcg, Intramuscular, Once, 3 of 3 cycles  Administration: 1,000 mcg (6/29/2023), 1,000 mcg (5/18/2023), 1,000 mcg (7/20/2023)  alteplase (CATHFLO), 2 mg, Intracatheter, Every 1 Minute as needed, 4 of 4 cycles  pegfilgrastim (NEULASTA ONPRO), 6 mg, Subcutaneous, Once, 3 of 3 cycles  Administration: 6 mg (6/8/2023), 6 mg (6/29/2023), 6 mg (7/20/2023)  fosaprepitant (EMEND) IVPB, 150 mg, Intravenous, Once, 4 of 4 cycles  Administration: 150 mg (5/18/2023), 150 mg (6/29/2023), 150 mg (7/20/2023), 150 mg (6/8/2023)  nivolumab (OPDIVO) IVPB, 360 mg (150 % of original dose 240 mg), Intravenous, Once, 4 of 4 cycles  Dose modification: 360 mg (original dose 240 mg, Cycle 1, Reason: Dose modified as per discussion with consulting physician)  Administration: 360 mg (5/18/2023), 360 mg (6/8/2023), 360 mg (6/29/2023), 360 mg (7/20/2023)  CARBOplatin (PARAPLATIN) IVPB (GOG AUC DOSING), 642.5 mg, Intravenous, Once, 4 of 4 cycles  Administration: 642.5 mg (5/18/2023), 642.5 mg (6/29/2023), 566.5 mg (7/20/2023), 650 mg (6/8/2023)  pemetrexed (ALIMTA) chemo infusion, 980 mg, Intravenous, Once, 4 of 4 cycles  Administration: 1,000 mg (5/18/2023), 1,000 mg (6/29/2023), 1,000 mg (7/20/2023), 1,000 mg (6/8/2023)     10/11/2023 - 10/11/2023 Chemotherapy    alteplase (CATHFLO), 2 mg, Intracatheter, Every 1 Minute as needed, 0 of 6 cycles  nivolumab (OPDIVO) IVPB, 240 mg, Intravenous, Once, 0 of 6 cycles     10/13/2023 -  Chemotherapy    alteplase (CATHFLO), 2 mg, Intracatheter, Every 1 Minute as needed, 4 of 7  cycles  nivolumab (OPDIVO) IVPB, 480 mg, Intravenous, Once, 4 of 7 cycles  Administration: 480 mg (10/13/2023), 480 mg (11/10/2023), 480 mg (12/8/2023), 480 mg (1/5/2024)     Carcinoma of right lung (HCC)   4/13/2023 Initial Diagnosis    Carcinoma of right lung (HCC)     4/13/2023 -  Cancer Staged    Staging form: Lung, AJCC 8th Edition  - Clinical: Stage JAY (cT3, cN1, cM1b) - Signed by James Contreras MD on 4/13/2023 4/19/2023 - 4/28/2023 Radiation    Plan ID Energy Fractions Dose per Fraction (cGy) Dose Correction (cGy) Total Dose Delivered (cGy) Elapsed Days   SRT R Frontal 6X-FFF 5 / 5 600 0 3,000 9         5/18/2023 - 7/20/2023 Chemotherapy    cyanocobalamin, 1,000 mcg, Intramuscular, Once, 3 of 3 cycles  Administration: 1,000 mcg (6/29/2023), 1,000 mcg (5/18/2023), 1,000 mcg (7/20/2023)  alteplase (CATHFLO), 2 mg, Intracatheter, Every 1 Minute as needed, 4 of 4 cycles  pegfilgrastim (NEULASTA ONPRO), 6 mg, Subcutaneous, Once, 3 of 3 cycles  Administration: 6 mg (6/8/2023), 6 mg (6/29/2023), 6 mg (7/20/2023)  fosaprepitant (EMEND) IVPB, 150 mg, Intravenous, Once, 4 of 4 cycles  Administration: 150 mg (5/18/2023), 150 mg (6/29/2023), 150 mg (7/20/2023), 150 mg (6/8/2023)  nivolumab (OPDIVO) IVPB, 360 mg (150 % of original dose 240 mg), Intravenous, Once, 4 of 4 cycles  Dose modification: 360 mg (original dose 240 mg, Cycle 1, Reason: Dose modified as per discussion with consulting physician)  Administration: 360 mg (5/18/2023), 360 mg (6/8/2023), 360 mg (6/29/2023), 360 mg (7/20/2023)  CARBOplatin (PARAPLATIN) IVPB [place] [place]), 642.5 mg, Intravenous, Once, 4 of 4 cycles  Administration: 642.5 mg (5/18/2023), 642.5 mg (6/29/2023), 566.5 mg (7/20/2023), 650 mg (6/8/2023)  pemetrexed (ALIMTA) chemo infusion, 980 mg, Intravenous, Once, 4 of 4 cycles  Administration: 1,000 mg (5/18/2023), 1,000 mg (6/29/2023), 1,000 mg (7/20/2023), 1,000 mg (6/8/2023)     9/1/2023 -  Cancer Staged    Staging form:  Lung, AJCC 8th Edition  - Pathologic stage from 9/1/2023: Stage JAY (pT2b, pN2, cM1b) - Signed by Treva Bah PA-C on 9/20/2023  Histopathologic type: Adenocarcinoma, NOS  Stage prefix: Initial diagnosis  Histologic grade (G): G3  Histologic grading system: 4 grade system       9/1/2023 Surgery    Right robotic converted to open upper lobectomy      9/1/2023 Biopsy    A.  Lung, right upper lobe:     - Invasive poorly differentiated solid adenocarcinoma of the lung, 4.4 cm.     - Tumor invades into, but not through, the visceral pleura (PL1), confirmed by special VVG stains.     - Lymphatic channel invasion by tumor identified.     - Metastatic carcinoma present in three of thirteen lymph nodes (3/13).       -- Rare fibrotic granulomas present.     - All margins are negative for tumor.     - Emphysematous changes.     B.  Lymph node, level 8:     - Negative for malignancy (0/1).     C.  Lymph node, level 7, #1:     - Negative for malignancy (0/1).     D.  Lymph node, level 4R:     - Negative for malignancy (0/1).     E.  Lymph node, level 4R, #2:     - Fibrovascular adipose tissue; negative for malignancy.     - No lymphoid tissue identified.     F.  Lymph node, level 2R, #1:     - Metastatic carcinoma present in one of three lymph nodes (1/3).     G.  Lymph node, level 2R, #2:     - Negative for malignancy (0/1).     H.  Lymph node, level 11 posterior:     - Single lymph node positive for metastatic carcinoma (1/1).     I.  Lymph node, portion of level 12 on artery:     - Negative for malignancy (0/1).     - Non-caseating granulomatous inflammation present.        -- Special stains for acid fast bacilli and fungal organisms are negative.        10/11/2023 - 10/11/2023 Chemotherapy    alteplase (CATHFLO), 2 mg, Intracatheter, Every 1 Minute as needed, 0 of 6 cycles  nivolumab (OPDIVO) IVPB, 240 mg, Intravenous, Once, 0 of 6 cycles     10/13/2023 -  Chemotherapy    alteplase (CATHFLO), 2 mg, Intracatheter, Every 1  "Minute as needed, 4 of 7 cycles  nivolumab (OPDIVO) IVPB, 480 mg, Intravenous, Once, 4 of 7 cycles  Administration: 480 mg (10/13/2023), 480 mg (11/10/2023), 480 mg (12/8/2023), 480 mg (1/5/2024)       Review of Systems   Constitutional: Negative.    HENT: Negative.     Eyes:         Watery eyes with crusting - since starting immunotherapy   Respiratory:  Positive for cough and shortness of breath.    Gastrointestinal: Negative.    Endocrine: Negative.    Genitourinary: Negative.    Musculoskeletal:  Positive for gait problem (since leg weakness this week).        Surgical pain slowly improving. Incision well healed, per pt.   Skin: Negative.    Neurological:  Positive for weakness (left sided weakness started about a week ago. arms and legs.) and light-headedness (at baseline).   Psychiatric/Behavioral: Negative.          OBJECTIVE:   /82   Pulse 78   Temp 98.2 °F (36.8 °C)   Resp 16   Wt 86.2 kg (190 lb)   SpO2 99%   BMI 28.06 kg/m²   Pain Assessment:  0  ECOG/Zubrod/WHO: 1 - Symptomatic but completely ambulatory    Physical Exam   Well appearing. NAD.   No increased work of breathing.   Extremities warm and well perfused.   Strength 5/5 in RUE and RLE. Strength 4/5 in LUE and LLE. Sensation intact to light touch. Ambulating with cane.     Assessment/Plan:  Orders Placed This Encounter   Procedures    MRI Brain BT w wo Contrast      We reviewed the patient's recent clinical history as well as his recent MRI Brain. On my review this demonstrates new enhancement and edema in the prior surgical bed/SRT site most consistent with radiation necrosis. I will initiate a steroid taper for treatment and plan for repeat MRI Brain in 2 months. I will plan to see him back thereafter.     James Contreras MD  1/24/2024,9:21 PM    Portions of the record may have been created with voice recognition software.  Occasional wrong word or \"sound a like\" substitutions may have occurred due to the inherent limitations " of voice recognition software.  Read the chart carefully and recognize, using context, where substitutions have occurred.

## 2024-01-25 NOTE — PROGRESS NOTES
Hematology/Oncology Outpatient Follow-up  Paras Pitts 68 y.o. male 1955 [id number]    Date:  1/25/2024        Assessment and Plan:  1. Carcinoma of right lung (HCC)  Clinical stage JAY at diagnosis was (cT3, cN1, cM1b) S/P surgical resection of the oligometastatic brain lesion followed by SRS.  Status post 4 cycles of neoadjuvant chemotherapy carboplatin, Alimta and nivolumab 7/2023.  Status post right upper lobe lobectomy 9/1/2023. The final pathology was pT2b, pN2, G3.  R0.     Started on adjuvant immunotherapy with nivolumab 480 mg on every 4-week basis on 10/13/2023.    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  He was started on tapering dose of Decadron.  The patient was told that we will need to delay the immunotherapy for somewhere between 3 to 4 weeks while he is on high-dose Decadron.  It is not into clear if the immunotherapy is contributing to the vasogenic edema in the brain.  After restarting him on the immunotherapy we will monitor him closely.  We did also discuss getting a CT scan of the chest abdomen pelvis prior to his next visit somewhere in March.    - CT chest abdomen pelvis w contrast; Future    2. Metastasis to brain (HCC)  He is status post surgical resection of the oligometastatic Brain lesion followed by postoperative RT with SRS/SRT in 5 fractions to avoid local recurrence.  He did have repeat MRI earlier this month which appears overall stable.  Recent MRI of the brain on 1/18/2023 showed vasogenic edema as stated above.    3. Drug-induced hypothyroidism  His TSH seems to be undetectable.  This is related to the current immunotherapy.  He is most likely going to go from hyperthyroidism to hypothyroidism due to the immunotherapy.  Will continue to monitor the thyroid function closely while on immunotherapy.        HPI:  The patient came there for  follow-up visit.  He had follow-up MRI of the brain on 1/18/2024 which showed:  IMPRESSION:     -Status post right parafalcine frontal mass resection and stereotactic radiosurgery. Interval increase in enhancement and surrounding vasogenic edema around the resection cavity with new area of enhancement along the inferior margin. No definite   increased signal on ASL however there is questionable area of increased cerebral blood volume along the superior margin. Differential includes radiation necrosis/treatment related changes versus recurrent disease. Recommend short interval follow-up.  He was then seen by the radiation oncology team who started him on tapering dose of dexamethasone since he seems to have vasogenic edema at the site where he had his radiation.  Recent blood work on 1/4/2024 showed normal CBC.  TSH undetectable.  Creatinine 0.8 with normal calcium and liver enzymes.  Free T3 was 4.3.  Magnesium 2.0.  Oncology History Overview Note    Patient with history of prostate cancer status post prostatectomy in 2001.  History of stroke in 2011, hypertension, etc.     The patient presented to the hospital on 3/19/2023 with 1 week of progressive left-sided weakness.  CTA of the brain showed right frontal lobe cystic lesion with surrounding vasogenic edema consistent with metastatic disease.  The patient then had CT scan of the chest abdomen pelvis on 3/20/2023 which showed:  IMPRESSION:  3.8 x 3.5 cm right upper lobe lung mass with associated overlying pleural tag and adjacent to pleural thickening, this may be due to pleural reaction or mild pleural invasion  No contralateral lung nodules or mass  Right hilar lymphadenopathy.    Small lower right paratracheal left paratracheal lymph node do not meet the criteria for pathologic enlargement on the bases of size or morphology  There is no CT evidence of for metastatic disease in the abdomen and pelvis  No lytic destructive lesion in the visualized bony  skeleton  The study was marked in EPIC for significant notification.      Bone scan was negative.     MRI of the brain on 3/21/2023 showed:  IMPRESSION:  Unchanged 2.5 cm right parasagittal frontal lobe vertex mass with central necrosis and marked perilesional vasogenic edema unchanged.  Favor metastatic disease (given lung mass) over primary high-grade glioma.  Unchanged small subacute infarct in right frontal lobe.  Unchanged 0.2 cm leftward midline shift.  No new acute intracranial abnormality.     The left-sided weakness improved with dexamethasone.  The patient then was taken to the OR on 3/23/2023 and had right frontal craniotomy and resection of the right parasagittal frontal lobe mass.  The pathology was compatible with metastatic non-small cell cancer.  The molecular evaluation through [facility] showed PD-L1 of 100% with high TMB of 10.  No oncogenic  mutation was found.     Completed postoperative RT with SRS/SRT in 5 fractions to avoid local recurrence.      He was then seen by the radiation oncology team and thoracic surgical team.  His case was discussed at the multidisciplinary thoracic tumor conference.  Was felt that he might be a surgical candidate for robotic right upper lobectomy and lymph node dissection after neoadjuvant treatment with chemotherapy and immunotherapy for his clinical T3N1 right upper lobe lung adenocarcinoma.     S/P Right Robotic converted to open upper lobectomy 9/1/23     Was not felt to be a candidate for adjuvant radiation therapy.     10/13/23-started adjuvant immunotherapy with nivolumab every 4 weeks     Oncology History   Metastatic adenocarcinoma (HCC)   2023 Initial Diagnosis    Metastatic adenocarcinoma (HCC)     3/23/2023 Biopsy    Brain, right frontal mass (biopsy):  - Metastatic non-small cell carcinoma     Comment:  - Tumor cells stain diffusely for CAM5.2, CKAE1/3, CK7, TTF1 and NapsinA with absent CK20, p63, CK5/6, p40 expression.  This immunopanel favors a  metastatic lung adenocarcinoma.       5/18/2023 - 7/20/2023 Chemotherapy    cyanocobalamin, 1,000 mcg, Intramuscular, Once, 3 of 3 cycles  Administration: 1,000 mcg (6/29/2023), 1,000 mcg (5/18/2023), 1,000 mcg (7/20/2023)  alteplase (CATHFLO), 2 mg, Intracatheter, Every 1 Minute as needed, 4 of 4 cycles  pegfilgrastim (NEULASTA ONPRO), 6 mg, Subcutaneous, Once, 3 of 3 cycles  Administration: 6 mg (6/8/2023), 6 mg (6/29/2023), 6 mg (7/20/2023)  fosaprepitant (EMEND) IVPB, 150 mg, Intravenous, Once, 4 of 4 cycles  Administration: 150 mg (5/18/2023), 150 mg (6/29/2023), 150 mg (7/20/2023), 150 mg (6/8/2023)  nivolumab (OPDIVO) IVPB, 360 mg (150 % of original dose 240 mg), Intravenous, Once, 4 of 4 cycles  Dose modification: 360 mg (original dose 240 mg, Cycle 1, Reason: Dose modified as per discussion with consulting physician)  Administration: 360 mg (5/18/2023), 360 mg (6/8/2023), 360 mg (6/29/2023), 360 mg (7/20/2023)  CARBOplatin (PARAPLATIN) IVPB (GO AUC DOSING), 642.5 mg, Intravenous, Once, 4 of 4 cycles  Administration: 642.5 mg (5/18/2023), 642.5 mg (6/29/2023), 566.5 mg (7/20/2023), 650 mg (6/8/2023)  pemetrexed (ALIMTA) chemo infusion, 980 mg, Intravenous, Once, 4 of 4 cycles  Administration: 1,000 mg (5/18/2023), 1,000 mg (6/29/2023), 1,000 mg (7/20/2023), 1,000 mg (6/8/2023)     10/11/2023 - 10/11/2023 Chemotherapy    alteplase (CATHFLO), 2 mg, Intracatheter, Every 1 Minute as needed, 0 of 6 cycles  nivolumab (OPDIVO) IVPB, 240 mg, Intravenous, Once, 0 of 6 cycles     10/13/2023 -  Chemotherapy    alteplase (CATHFLO), 2 mg, Intracatheter, Every 1 Minute as needed, 4 of 7 cycles  nivolumab (OPDIVO) IVPB, 480 mg, Intravenous, Once, 4 of 7 cycles  Administration: 480 mg (10/13/2023), 480 mg (11/10/2023), 480 mg (12/8/2023), 480 mg (1/5/2024)     Carcinoma of right lung (HCC)   4/13/2023 Initial Diagnosis    Carcinoma of right lung (HCC)     4/13/2023 -  Cancer Staged    Staging form: Lung, AJCC 8th  Edition  - Clinical: Stage JAY (cT3, cN1, cM1b) - Signed by James Contreras MD on 4/13/2023 4/19/2023 - 4/28/2023 Radiation    Plan ID Energy Fractions Dose per Fraction (cGy) Dose Correction (cGy) Total Dose Delivered (cGy) Elapsed Days   SRT R Frontal 6X-FFF 5 / 5 600 0 3,000 9         5/18/2023 - 7/20/2023 Chemotherapy    cyanocobalamin, 1,000 mcg, Intramuscular, Once, 3 of 3 cycles  Administration: 1,000 mcg (6/29/2023), 1,000 mcg (5/18/2023), 1,000 mcg (7/20/2023)  alteplase (CATHFLO), 2 mg, Intracatheter, Every 1 Minute as needed, 4 of 4 cycles  pegfilgrastim (NEULASTA ONPRO), 6 mg, Subcutaneous, Once, 3 of 3 cycles  Administration: 6 mg (6/8/2023), 6 mg (6/29/2023), 6 mg (7/20/2023)  fosaprepitant (EMEND) IVPB, 150 mg, Intravenous, Once, 4 of 4 cycles  Administration: 150 mg (5/18/2023), 150 mg (6/29/2023), 150 mg (7/20/2023), 150 mg (6/8/2023)  nivolumab (OPDIVO) IVPB, 360 mg (150 % of original dose 240 mg), Intravenous, Once, 4 of 4 cycles  Dose modification: 360 mg (original dose 240 mg, Cycle 1, Reason: Dose modified as per discussion with consulting physician)  Administration: 360 mg (5/18/2023), 360 mg (6/8/2023), 360 mg (6/29/2023), 360 mg (7/20/2023)  CARBOplatin (PARAPLATIN) IVPB (GOG AUC DOSING), 642.5 mg, Intravenous, Once, 4 of 4 cycles  Administration: 642.5 mg (5/18/2023), 642.5 mg (6/29/2023), 566.5 mg (7/20/2023), 650 mg (6/8/2023)  pemetrexed (ALIMTA) chemo infusion, 980 mg, Intravenous, Once, 4 of 4 cycles  Administration: 1,000 mg (5/18/2023), 1,000 mg (6/29/2023), 1,000 mg (7/20/2023), 1,000 mg (6/8/2023)     9/1/2023 -  Cancer Staged    Staging form: Lung, AJCC 8th Edition  - Pathologic stage from 9/1/2023: Stage JAY (pT2b, pN2, cM1b) - Signed by Treva Bah PA-C on 9/20/2023  Histopathologic type: Adenocarcinoma, NOS  Stage prefix: Initial diagnosis  Histologic grade (G): G3  Histologic grading system: 4 grade system       9/1/2023 Surgery    Right robotic converted to  open upper lobectomy      9/1/2023 Biopsy    A.  Lung, right upper lobe:     - Invasive poorly differentiated solid adenocarcinoma of the lung, 4.4 cm.     - Tumor invades into, but not through, the visceral pleura (PL1), confirmed by special VVG stains.     - Lymphatic channel invasion by tumor identified.     - Metastatic carcinoma present in three of thirteen lymph nodes (3/13).       -- Rare fibrotic granulomas present.     - All margins are negative for tumor.     - Emphysematous changes.     B.  Lymph node, level 8:     - Negative for malignancy (0/1).     C.  Lymph node, level 7, #1:     - Negative for malignancy (0/1).     D.  Lymph node, level 4R:     - Negative for malignancy (0/1).     E.  Lymph node, level 4R, #2:     - Fibrovascular adipose tissue; negative for malignancy.     - No lymphoid tissue identified.     F.  Lymph node, level 2R, #1:     - Metastatic carcinoma present in one of three lymph nodes (1/3).     G.  Lymph node, level 2R, #2:     - Negative for malignancy (0/1).     H.  Lymph node, level 11 posterior:     - Single lymph node positive for metastatic carcinoma (1/1).     I.  Lymph node, portion of level 12 on artery:     - Negative for malignancy (0/1).     - Non-caseating granulomatous inflammation present.        -- Special stains for acid fast bacilli and fungal organisms are negative.        10/11/2023 - 10/11/2023 Chemotherapy    alteplase (CATHFLO), 2 mg, Intracatheter, Every 1 Minute as needed, 0 of 6 cycles  nivolumab (OPDIVO) IVPB, 240 mg, Intravenous, Once, 0 of 6 cycles     10/13/2023 -  Chemotherapy    alteplase (CATHFLO), 2 mg, Intracatheter, Every 1 Minute as needed, 4 of 7 cycles  nivolumab (OPDIVO) IVPB, 480 mg, Intravenous, Once, 4 of 7 cycles  Administration: 480 mg (10/13/2023), 480 mg (11/10/2023), 480 mg (12/8/2023), 480 mg (1/5/2024)         Interval history:    ROS: Review of Systems   Constitutional:  Negative for chills and fever.   HENT:  Negative for ear pain  and sore throat.    Eyes:  Negative for pain and visual disturbance.   Respiratory:  Positive for shortness of breath. Negative for cough.    Cardiovascular:  Negative for chest pain and palpitations.   Gastrointestinal:  Negative for abdominal pain and vomiting.   Genitourinary:  Negative for dysuria and hematuria.   Musculoskeletal:  Negative for arthralgias and back pain.   Skin:  Negative for color change and rash.   Neurological:  Positive for numbness. Negative for seizures and syncope.   All other systems reviewed and are negative.      Past Medical History:   Diagnosis Date    Brain compression (HCC) 03/20/2023    Brain mass 03/20/2023    Cancer (HCC) 2001    Receint lung and brain lesions and prostate cancer in 2001    Cerebral edema (HCC) 03/20/2023    CVA (cerebral vascular accident) (HCC) 08/12/2021    Hypertension     Prostate cancer (HCC) 2001    Rectal bleeding     Stroke ([facility])     2011         Past Surgical History:   Procedure Laterality Date    COLONOSCOPY      CRANIOTOMY Right 3/23/2023    Procedure: Right frontal CRANIOTOMY IMAGE-GUIDED FOR TUMOR;  Surgeon: Clark Carrillo MD;  [facility]: [facility];  Service: Neurosurgery    IR PORT PLACEMENT  4/27/2023    [place] [facility] [place]/CELL WASHG SPX N/A 9/1/2023    Procedure: BRONCHOSCOPY FLEXIBLE;  Surgeon: Kalia Sparrow MD;  [facility]: [facility];  Service: Thoracic    IL CYSTOURETHROSCOPY N/A 3/23/2023    Procedure: EUA, DEL CASTILLO INSERTION;  Surgeon: Ajay Piedra MD;  Location: [facility];  Service: Urology    [place] THORACOSCOPY W/LOBECTOMY SINGLE LOBE Right 9/1/2023    Procedure: robotic assisted converted to open right upper lobectomy, lymph node dissection;  Surgeon: Kalia Sparrow MD;  [facility];  Service: Thoracic    PROSTATECTOMY  2001    THORACOTOMY Right 9/1/2023    Procedure: right thoracotomy with Cryo-Ablation;  Surgeon: Kalia Sparrow MD;  [facility];  Service: Thoracic    TONSILLECTOMY         Social  History     Socioeconomic History    Marital status: /Civil Union     Spouse name: None    Number of children: None    Years of education: None    Highest education level: None   Occupational History    None   Tobacco Use    Smoking status: Former     Current packs/day: 0.00     Average packs/day: 1 pack/day for 15.0 years (15.0 ttl pk-yrs)     Types: Cigarettes     Start date:      Quit date:      Years since quittin.0     Passive exposure: Never    Smokeless tobacco: Never    Tobacco comments:     i quit when i was 28. not sure of exact dates   Vaping Use    Vaping status: Never Used   Substance and Sexual Activity    Alcohol use: Yes     Alcohol/week: 6.0 standard drinks of alcohol     Types: 6 Cans of beer per week     Comment: socially  last drink 3/23    Drug use: Yes     Types: Marijuana     Comment: gummies foro nausea with chemo    Sexual activity: Yes     Partners: Female     Birth control/protection: None   Other Topics Concern    None   Social History Narrative    None     Social Determinants of Health     Financial Resource Strain: Not on file   Food Insecurity: No Food Insecurity (2023)    Hunger Vital Sign     Worried About Running Out of Food in the Last Year: Never true     Ran Out of Food in the Last Year: Never true   Transportation Needs: No Transportation Needs (2023)    PRAPARE - Transportation     Lack of Transportation (Medical): No     Lack of Transportation (Non-Medical): No   Physical Activity: Not on file   Stress: Not on file   Social Connections: Not on file   Intimate Partner Violence: Not on file   Housing Stability: Low Risk  (2023)    Housing Stability Vital Sign     Unable to Pay for Housing in the Last Year: No     Number of Places Lived in the Last Year: 1     Unstable Housing in the Last Year: No       Family History   Problem Relation Age of Onset    Dementia Mother             Breast cancer Sister             Prostate cancer  "Brother         Received Radiation       No Known Allergies      Current Outpatient Medications:     amLODIPine (NORVASC) 10 mg tablet, Take 1 tablet (10 mg total) by mouth daily, Disp: 90 tablet, Rfl: 0    atorvastatin (LIPITOR) 40 mg tablet, Take 1 tablet (40 mg total) by mouth daily after dinner, Disp: 30 tablet, Rfl: 6    [START ON 2/28/2024] dexamethasone (DECADRON) 1 mg tablet, Start 1mg tabs after completion of all 2mg tablets. Do not start before February 28, 2024., Disp: 7 tablet, Rfl: 0    dexamethasone (DECADRON) 2 mg tablet, Take 2 tablets (4 mg total) by mouth 2 (two) times a day with meals for 14 days, THEN 1 tablet (2 mg total) 3 (three) times a day for 7 days, THEN 1 tablet (2 mg total) 2 (two) times a day with meals for 7 days, THEN 1 tablet (2 mg total) daily with breakfast for 7 days., Disp: 98 tablet, Rfl: 0    gabapentin (Neurontin) 300 mg capsule, Take 1 capsule (300 mg total) by mouth 3 (three) times a day, Disp: 90 capsule, Rfl: 1    losartan (COZAAR) 50 mg tablet, Take 1 tablet (50 mg total) by mouth daily, Disp: 90 tablet, Rfl: 0    pantoprazole (PROTONIX) 40 mg tablet, Take 1 tablet (40 mg total) by mouth daily, Disp: 60 tablet, Rfl: 0    docusate sodium (COLACE) 100 mg capsule, Take 1 capsule (100 mg total) by mouth daily Do not start before September 6, 2023. (Patient not taking: Reported on 11/29/2023), Disp: 20 capsule, Rfl: 0    ondansetron (ZOFRAN) 8 mg tablet, Take 1 tablet (8 mg total) by mouth every 8 (eight) hours as needed for nausea or vomiting (Patient not taking: Reported on 10/20/2023), Disp: 20 tablet, Rfl: 3      Physical Exam:  /80 (BP Location: Right arm, Patient Position: Sitting, Cuff Size: Adult)   Pulse 90   Temp 98.6 °F (37 °C)   Resp 16   Ht 5' 9\" (1.753 m)   Wt 84.8 kg (187 lb)   SpO2 97%   BMI 27.62 kg/m²     Physical Exam  Constitutional:       Appearance: He is well-developed.   HENT:      Head: Normocephalic and atraumatic.      Nose: Nose normal. "   Eyes:      General: No scleral icterus.        Right eye: No discharge.         Left eye: No discharge.      Conjunctiva/sclera: Conjunctivae normal.      Pupils: Pupils are equal, round, and reactive to light.   Neck:      Thyroid: No thyromegaly.      Trachea: No tracheal deviation.   Cardiovascular:      Rate and Rhythm: Normal rate and regular rhythm.      Heart sounds: Normal heart sounds. No murmur heard.     No friction rub.   Pulmonary:      Effort: Pulmonary effort is normal. No respiratory distress.      Breath sounds: Normal breath sounds. No wheezing or rales.   Chest:      Chest wall: No tenderness.   Abdominal:      General: There is no distension.      Palpations: Abdomen is soft. There is no hepatomegaly or splenomegaly.      Tenderness: There is no abdominal tenderness. There is no guarding or rebound.   Musculoskeletal:         General: No tenderness or deformity. Normal range of motion.      Cervical back: Normal range of motion and neck supple.   Lymphadenopathy:      Cervical: No cervical adenopathy.   Skin:     General: Skin is warm and dry.      Coloration: Skin is not pale.      Findings: No erythema or rash.   Neurological:      Mental Status: He is alert and oriented to person, place, and time.      Cranial Nerves: No cranial nerve deficit.      Coordination: Coordination normal.      Deep Tendon Reflexes: Reflexes are normal and symmetric.   Psychiatric:         Behavior: Behavior normal.         Thought Content: Thought content normal.         Judgment: Judgment normal.           Labs:  Lab Results   Component Value Date    WBC 6.27 01/04/2024    HGB 13.8 01/04/2024    HCT 40.8 01/04/2024    MCV 91 01/04/2024     01/04/2024     Lab Results   Component Value Date     05/10/2018    K 4.0 01/04/2024     01/04/2024    CO2 28 01/04/2024    ANIONGAP 11.6 05/10/2018    BUN 11 01/04/2024    CREATININE 0.81 01/04/2024    GLUCOSE 190 (H) 09/01/2023    GLUF 116 (H) 06/27/2023     "CALCIUM 9.0 01/04/2024    AST 17 01/04/2024    ALT 12 01/04/2024    ALKPHOS 81 01/04/2024    PROT 7.2 05/10/2018    BILITOT 0.8 05/10/2018    EGFR 90 01/04/2024     No results found for: \"TSH\"    Patient voiced understanding and agreement in the above discussion. Aware to contact our office with questions/symptoms in the interim.   "

## 2024-01-25 NOTE — TELEPHONE ENCOUNTER
Phoned [facility] and spoke to [facility]  to let staff know taht plan is on hold as pt is on high dose steroids due to some brain issues so Nivolumab is on hold for 4 weeks changed date to 2/23/24 for next treatment.

## 2024-01-25 NOTE — TELEPHONE ENCOUNTER
Spoke with patient, stating we were able to get him scheduled with Dr. Langford on 3/7/24 at 1:20 PM.      Brittani, please schedule patient. Thanks!

## 2024-02-22 ENCOUNTER — HOSPITAL ENCOUNTER (OUTPATIENT)
Dept: INFUSION CENTER | Facility: HOSPITAL | Age: 69
End: 2024-02-22
Payer: COMMERCIAL

## 2024-02-22 DIAGNOSIS — D70.1 CHEMOTHERAPY-INDUCED NEUTROPENIA: ICD-10-CM

## 2024-02-22 DIAGNOSIS — Z45.2 ENCOUNTER FOR CENTRAL LINE CARE: Primary | ICD-10-CM

## 2024-02-22 DIAGNOSIS — C34.91 CARCINOMA OF RIGHT LUNG (HCC): ICD-10-CM

## 2024-02-22 DIAGNOSIS — T45.1X5A CHEMOTHERAPY-INDUCED NEUTROPENIA: ICD-10-CM

## 2024-02-22 DIAGNOSIS — C79.9 METASTATIC ADENOCARCINOMA (HCC): ICD-10-CM

## 2024-02-22 LAB
ALBUMIN SERPL BCP-MCNC: 4.4 G/DL (ref 3.5–5)
ALP SERPL-CCNC: 46 U/L (ref 34–104)
ALT SERPL W P-5'-P-CCNC: 32 U/L (ref 7–52)
ANION GAP SERPL CALCULATED.3IONS-SCNC: 8 MMOL/L
AST SERPL W P-5'-P-CCNC: 23 U/L (ref 13–39)
BASOPHILS # BLD MANUAL: 0 THOUSAND/UL (ref 0–0.1)
BASOPHILS NFR MAR MANUAL: 0 % (ref 0–1)
BILIRUB SERPL-MCNC: 0.85 MG/DL (ref 0.2–1)
BUN SERPL-MCNC: 23 MG/DL (ref 5–25)
CALCIUM SERPL-MCNC: 8.7 MG/DL (ref 8.4–10.2)
CHLORIDE SERPL-SCNC: 102 MMOL/L (ref 96–108)
CO2 SERPL-SCNC: 29 MMOL/L (ref 21–32)
CREAT SERPL-MCNC: 0.84 MG/DL (ref 0.6–1.3)
EOSINOPHIL # BLD MANUAL: 0.29 THOUSAND/UL (ref 0–0.4)
EOSINOPHIL NFR BLD MANUAL: 3 % (ref 0–6)
ERYTHROCYTE [DISTWIDTH] IN BLOOD BY AUTOMATED COUNT: 13 % (ref 11.6–15.1)
GFR SERPL CREATININE-BSD FRML MDRD: 88 ML/MIN/1.73SQ M
GLUCOSE SERPL-MCNC: 137 MG/DL (ref 65–140)
HCT VFR BLD AUTO: 48.9 % (ref 36.5–49.3)
HGB BLD-MCNC: 15.8 G/DL (ref 12–17)
LG PLATELETS BLD QL SMEAR: PRESENT
LYMPHOCYTES # BLD AUTO: 3.42 THOUSAND/UL (ref 0.6–4.47)
LYMPHOCYTES # BLD AUTO: 33 % (ref 14–44)
MAGNESIUM SERPL-MCNC: 1.7 MG/DL (ref 1.9–2.7)
MCH RBC QN AUTO: 30.7 PG (ref 26.8–34.3)
MCHC RBC AUTO-ENTMCNC: 32.3 G/DL (ref 31.4–37.4)
MCV RBC AUTO: 95 FL (ref 82–98)
MONOCYTES # BLD AUTO: 0.39 THOUSAND/UL (ref 0–1.22)
MONOCYTES NFR BLD: 4 % (ref 4–12)
MYELOCYTES NFR BLD MANUAL: 1 % (ref 0–1)
NEUTROPHILS # BLD MANUAL: 5.56 THOUSAND/UL (ref 1.85–7.62)
NEUTS SEG NFR BLD AUTO: 57 % (ref 43–75)
NRBC BLD AUTO-RTO: 1 /100 WBC (ref 0–2)
PLATELET # BLD AUTO: 116 THOUSANDS/UL (ref 149–390)
PLATELET BLD QL SMEAR: ABNORMAL
PMV BLD AUTO: 9.8 FL (ref 8.9–12.7)
POTASSIUM SERPL-SCNC: 3.7 MMOL/L (ref 3.5–5.3)
PROT SERPL-MCNC: 6.5 G/DL (ref 6.4–8.4)
RBC # BLD AUTO: 5.15 MILLION/UL (ref 3.88–5.62)
RBC MORPH BLD: NORMAL
SODIUM SERPL-SCNC: 139 MMOL/L (ref 135–147)
T3FREE SERPL-MCNC: 3.76 PG/ML (ref 2.5–3.9)
T4 FREE SERPL-MCNC: 0.82 NG/DL (ref 0.61–1.12)
TSH SERPL DL<=0.05 MIU/L-ACNC: 6.5 UIU/ML (ref 0.45–4.5)
VARIANT LYMPHS # BLD AUTO: 2 %
WBC # BLD AUTO: 9.76 THOUSAND/UL (ref 4.31–10.16)

## 2024-02-22 PROCEDURE — 84481 FREE ASSAY (FT-3): CPT | Performed by: INTERNAL MEDICINE

## 2024-02-22 PROCEDURE — 84439 ASSAY OF FREE THYROXINE: CPT | Performed by: INTERNAL MEDICINE

## 2024-02-22 PROCEDURE — 85007 BL SMEAR W/DIFF WBC COUNT: CPT | Performed by: INTERNAL MEDICINE

## 2024-02-22 PROCEDURE — 84443 ASSAY THYROID STIM HORMONE: CPT | Performed by: INTERNAL MEDICINE

## 2024-02-22 PROCEDURE — 85027 COMPLETE CBC AUTOMATED: CPT | Performed by: INTERNAL MEDICINE

## 2024-02-22 PROCEDURE — 80053 COMPREHEN METABOLIC PANEL: CPT | Performed by: INTERNAL MEDICINE

## 2024-02-22 PROCEDURE — 83735 ASSAY OF MAGNESIUM: CPT

## 2024-02-22 NOTE — PROGRESS NOTES
Paras Pitts  had central labs done and port flushed per protocol.     Paras Pitts is aware of future appt on 2-23-24 900    AVS Declined by Paras Pitts

## 2024-02-23 ENCOUNTER — HOSPITAL ENCOUNTER (OUTPATIENT)
Dept: INFUSION CENTER | Facility: HOSPITAL | Age: 69
End: 2024-02-23
Attending: INTERNAL MEDICINE
Payer: COMMERCIAL

## 2024-02-23 VITALS
HEART RATE: 83 BPM | OXYGEN SATURATION: 97 % | SYSTOLIC BLOOD PRESSURE: 122 MMHG | DIASTOLIC BLOOD PRESSURE: 76 MMHG | RESPIRATION RATE: 18 BRPM | TEMPERATURE: 97.8 F

## 2024-02-23 DIAGNOSIS — C34.91 CARCINOMA OF RIGHT LUNG (HCC): Primary | ICD-10-CM

## 2024-02-23 DIAGNOSIS — C79.9 METASTATIC ADENOCARCINOMA (HCC): ICD-10-CM

## 2024-02-23 DIAGNOSIS — T45.1X5A CHEMOTHERAPY-INDUCED NEUTROPENIA: ICD-10-CM

## 2024-02-23 DIAGNOSIS — D70.1 CHEMOTHERAPY-INDUCED NEUTROPENIA: ICD-10-CM

## 2024-02-23 RX ORDER — SODIUM CHLORIDE 9 MG/ML
20 INJECTION, SOLUTION INTRAVENOUS ONCE
Status: COMPLETED | OUTPATIENT
Start: 2024-02-23 | End: 2024-02-23

## 2024-02-23 RX ORDER — MAGNESIUM SULFATE HEPTAHYDRATE 40 MG/ML
2 INJECTION, SOLUTION INTRAVENOUS ONCE
Status: CANCELLED
Start: 2024-02-23

## 2024-02-23 RX ORDER — MAGNESIUM SULFATE HEPTAHYDRATE 40 MG/ML
2 INJECTION, SOLUTION INTRAVENOUS ONCE
Status: COMPLETED | OUTPATIENT
Start: 2024-02-23 | End: 2024-02-23

## 2024-02-23 RX ADMIN — SODIUM CHLORIDE 480 MG: 9 INJECTION, SOLUTION INTRAVENOUS at 10:41

## 2024-02-23 RX ADMIN — MAGNESIUM SULFATE IN WATER 2 G: 40 INJECTION, SOLUTION INTRAVENOUS at 09:21

## 2024-02-23 RX ADMIN — SODIUM CHLORIDE 20 ML/HR: 0.9 INJECTION, SOLUTION INTRAVENOUS at 09:21

## 2024-02-23 NOTE — PROGRESS NOTES
Paras Pitts  tolerated treatment well with no complications.      Paras Pitts is aware of future appt on 3/21 at 10am.     AVS printed and given to Paras Pitts:  Yes

## 2024-02-23 NOTE — PROGRESS NOTES
Pt here for Opdivo infusion. Pt offered no acute complaints today. Vitals stable. Labs 2/22/24 reviewed. Mag 1.7 (added to plan), TSH 6.500 Debbie Acevedo RN with office made aware and per Debbie pitt to proceed with tx today and will review level on next lab check.   Port accessed brisk blood return noted.

## 2024-02-27 DIAGNOSIS — I10 ESSENTIAL HYPERTENSION: ICD-10-CM

## 2024-02-27 RX ORDER — AMLODIPINE BESYLATE 10 MG/1
10 TABLET ORAL DAILY
Qty: 90 TABLET | Refills: 0 | Status: SHIPPED | OUTPATIENT
Start: 2024-02-27

## 2024-03-05 ENCOUNTER — HOSPITAL ENCOUNTER (OUTPATIENT)
Dept: CT IMAGING | Facility: HOSPITAL | Age: 69
Discharge: HOME | End: 2024-03-05
Attending: INTERNAL MEDICINE
Payer: COMMERCIAL

## 2024-03-05 ENCOUNTER — HOSPITAL ENCOUNTER (OUTPATIENT)
Dept: CT IMAGING | Facility: HOSPITAL | Age: 69
Discharge: HOME | End: 2024-03-05
Payer: COMMERCIAL

## 2024-03-05 DIAGNOSIS — G89.12 ACUTE POST-THORACOTOMY PAIN: ICD-10-CM

## 2024-03-05 DIAGNOSIS — C34.91 CARCINOMA OF RIGHT LUNG (HCC): ICD-10-CM

## 2024-03-05 PROCEDURE — 71260 CT THORAX DX C+: CPT

## 2024-03-05 PROCEDURE — 74177 CT ABD & PELVIS W/CONTRAST: CPT

## 2024-03-05 PROCEDURE — G1004 CDSM NDSC: HCPCS

## 2024-03-05 RX ADMIN — IOHEXOL 100 ML: 350 INJECTION, SOLUTION INTRAVENOUS at 09:04

## 2024-03-07 ENCOUNTER — OFFICE VISIT (OUTPATIENT)
Dept: HEMATOLOGY ONCOLOGY | Facility: CLINIC | Age: 69
End: 2024-03-07
Payer: COMMERCIAL

## 2024-03-07 ENCOUNTER — HOSPITAL ENCOUNTER (OUTPATIENT)
Dept: NON INVASIVE DIAGNOSTICS | Facility: HOSPITAL | Age: 69
Discharge: HOME | End: 2024-03-07
Attending: INTERNAL MEDICINE
Payer: COMMERCIAL

## 2024-03-07 VITALS
WEIGHT: 194 LBS | RESPIRATION RATE: 17 BRPM | OXYGEN SATURATION: 98 % | HEIGHT: 69 IN | DIASTOLIC BLOOD PRESSURE: 88 MMHG | HEART RATE: 94 BPM | BODY MASS INDEX: 28.73 KG/M2 | TEMPERATURE: 97.7 F | SYSTOLIC BLOOD PRESSURE: 160 MMHG

## 2024-03-07 DIAGNOSIS — T45.1X5A CHEMOTHERAPY-INDUCED NEUTROPENIA: ICD-10-CM

## 2024-03-07 DIAGNOSIS — C34.91 CARCINOMA OF RIGHT LUNG (HCC): Primary | ICD-10-CM

## 2024-03-07 DIAGNOSIS — M79.662 PAIN OF LEFT CALF: ICD-10-CM

## 2024-03-07 DIAGNOSIS — C79.31 METASTASIS TO BRAIN (HCC): ICD-10-CM

## 2024-03-07 DIAGNOSIS — E03.2 DRUG-INDUCED HYPOTHYROIDISM: ICD-10-CM

## 2024-03-07 DIAGNOSIS — C79.9 METASTATIC ADENOCARCINOMA (HCC): ICD-10-CM

## 2024-03-07 DIAGNOSIS — D70.1 CHEMOTHERAPY-INDUCED NEUTROPENIA: ICD-10-CM

## 2024-03-07 PROCEDURE — 93971 EXTREMITY STUDY: CPT

## 2024-03-07 PROCEDURE — 93971 EXTREMITY STUDY: CPT | Performed by: SURGERY

## 2024-03-07 PROCEDURE — 99215 OFFICE O/P EST HI 40 MIN: CPT | Performed by: INTERNAL MEDICINE

## 2024-03-07 RX ORDER — SODIUM CHLORIDE 9 MG/ML
20 INJECTION, SOLUTION INTRAVENOUS ONCE
OUTPATIENT
Start: 2024-03-22

## 2024-03-07 NOTE — PROGRESS NOTES
Hematology/Oncology Outpatient Follow-up  Paras Pitts 68 y.o. male 1955 [id number]    Date:  3/7/2024        Assessment and Plan:  1. Carcinoma of right lung (HCC)  Clinical stage JAY at diagnosis was (cT3, cN1, cM1b) S/P surgical resection of the oligometastatic brain lesion followed by SRS.  Status post 4 cycles of neoadjuvant chemotherapy carboplatin, Alimta and nivolumab 7/2023.  Status post right upper lobe lobectomy 9/1/2023. The final pathology was pT2b, pN2, G3.  R0.     Started on adjuvant immunotherapy with nivolumab 480 mg on every 4-week basis on 10/13/2023.    I did discuss with the patient the result of the recent CT scan of the chest abdomen pelvis which was done on 3/5/2024 and showed a new lower right paratracheal lymphadenopathy measuring 2.2 cm close to the mediastinum he also seems to have nodular soft tissue along the left lateral upper thoracic trachea of unknown etiology.  The patient was told that we will pursue a PET CT scan for further evaluation of the abnormal finding seen on the recent CT scan of the chest.  He will be also seeing the thoracic surgical team for follow-up on 3/20/2024.  I did ask him to discuss the result of the CT scan and PET CT scan and see if he bronchoscopy/EBUS guided biopsy would be necessary for further evaluation of the recently seen abnormality.    Meanwhile, he will be continued on the current treatment with nivolumab on every 4-week basis.  He will be monitored closely for any toxicity related to the immunotherapy.    - CBC and differential; Future  - Comprehensive metabolic panel; Future  - TSH, 3rd generation with Free T4 reflex; Future  - T3, free; Future  - CBC and differential; Future  - Comprehensive metabolic panel; Future  - Magnesium; Future  - NM PET CT skull base to mid thigh; Future  - TSH, 3rd generation with Free T4 reflex; Future    2. Metastatic adenocarcinoma (HCC)  As above.  - CBC and differential; Future  - Comprehensive metabolic  panel; Future  - TSH, 3rd generation with Free T4 reflex; Future  - T3, free; Future  - NM PET CT skull base to mid thigh; Future    3. Chemotherapy-induced neutropenia     - CBC and differential; Future  - Comprehensive metabolic panel; Future  - TSH, 3rd generation with Free T4 reflex; Future  - T3, free; Future    4. Pain of left calf  He did complain about left calf pain which started recently.  Will pursue a Doppler study to rule out deep vein thrombosis.  - VAS VENOUS DUPLEX -LOWER LIMB UNILATERAL; Future    5. Metastasis to brain (HCC)  He is status post surgical resection of the oligometastatic Brain lesion followed by postoperative RT with SRS/SRT in 5 fractions to avoid local recurrence.  He did have repeat MRI earlier this month which appears overall stable.  Recent MRI of the brain on 1/18/2023 showed vasogenic edema.  He was on a tapering dose of steroids until recently.  He is scheduled for brain MRI on 3/25/2024 for close follow-up.    6. Drug-induced hypothyroidism  Thyroid function will be monitored while on immunotherapy.          HPI:  The patient came there for follow-up visit.  He did complain about some arthralgia and left calf pain which started recently.  He did have a CT scan of the chest abdomen pelvis on 3/5/2024 which showed:  IMPRESSION:     There is new lower right paratracheal lymphadenopathy in the mediastinum,, measuring 1.9 cm x 2.2 cm. (Level 4R) this may be a neoplastic lymph node or may drug-induced granulomatous reaction, needs further characterization     Nodular soft tissue seen along the left lateral upper thoracic trachea, image 25 series 2, indeterminate may be a focal lesion or adherent secretions. Consider follow-up CT at 3 months or characterization with bronchoscopy     Postsurgical changes from the resection of the right upper lobe mass.    Recent available blood work on 2/22/2024 showed hemoglobin of 15.8 with normal white cells.  Platelet count was 116.  Creatinine  0.8 with normal calcium and liver enzymes.  TSH 6.5.  Magnesium 1.7.  He stated he is currently off of the dexamethasone which was given by the radiation oncology team for the vasogenic edema seen on recent MRI of the brain.    Oncology History   Metastatic adenocarcinoma (HCC)   2023 Initial Diagnosis    Metastatic adenocarcinoma (HCC)     3/23/2023 Biopsy    Brain, right frontal mass (biopsy):  - Metastatic non-small cell carcinoma     Comment:  - Tumor cells stain diffusely for CAM5.2, CKAE1/3, CK7, TTF1 and NapsinA with absent CK20, p63, CK5/6, p40 expression.  This immunopanel favors a metastatic lung adenocarcinoma.       5/18/2023 - 7/20/2023 Chemotherapy    cyanocobalamin, 1,000 mcg, Intramuscular, Once, 3 of 3 cycles  Administration: 1,000 mcg (6/29/2023), 1,000 mcg (5/18/2023), 1,000 mcg (7/20/2023)  alteplase (CATHFLO), 2 mg, Intracatheter, Every 1 Minute as needed, 4 of 4 cycles  pegfilgrastim (NEULASTA ONPRO), 6 mg, Subcutaneous, Once, 3 of 3 cycles  Administration: 6 mg (6/8/2023), 6 mg (6/29/2023), 6 mg (7/20/2023)  fosaprepitant (EMEND) IVPB, 150 mg, Intravenous, Once, 4 of 4 cycles  Administration: 150 mg (5/18/2023), 150 mg (6/29/2023), 150 mg (7/20/2023), 150 mg (6/8/2023)  nivolumab (OPDIVO) IVPB, 360 mg (150 % of original dose 240 mg), Intravenous, Once, 4 of 4 cycles  Dose modification: 360 mg (original dose 240 mg, Cycle 1, Reason: Dose modified as per discussion with consulting physician)  Administration: 360 mg (5/18/2023), 360 mg (6/8/2023), 360 mg (6/29/2023), 360 mg (7/20/2023)  CARBOplatin (PARAPLATIN) IVPB (GOG AUC DOSING), 642.5 mg, Intravenous, Once, 4 of 4 cycles  Administration: 642.5 mg (5/18/2023), 642.5 mg (6/29/2023), 566.5 mg (7/20/2023), 650 mg (6/8/2023)  pemetrexed (ALIMTA) chemo infusion, 980 mg, Intravenous, Once, 4 of 4 cycles  Administration: 1,000 mg (5/18/2023), 1,000 mg (6/29/2023), 1,000 mg (7/20/2023), 1,000 mg (6/8/2023)     10/11/2023 - 10/11/2023 Chemotherapy     alteplase (CATHFLO), 2 mg, Intracatheter, Every 1 Minute as needed, 0 of 6 cycles  nivolumab (OPDIVO) IVPB, 240 mg, Intravenous, Once, 0 of 6 cycles     10/13/2023 -  Chemotherapy    alteplase (CATHFLO), 2 mg, Intracatheter, Every 1 Minute as needed, 5 of 8 cycles  nivolumab (OPDIVO) IVPB, 480 mg, Intravenous, Once, 5 of 8 cycles  Administration: 480 mg (10/13/2023), 480 mg (11/10/2023), 480 mg (12/8/2023), 480 mg (1/5/2024), 480 mg (2/23/2024)     Carcinoma of right lung (HCC)   4/13/2023 Initial Diagnosis    Carcinoma of right lung (HCC)     4/13/2023 -  Cancer Staged    Staging form: Lung, AJCC 8th Edition  - Clinical: Stage JAY (cT3, cN1, cM1b) - Signed by James Contreras MD on 4/13/2023 4/19/2023 - 4/28/2023 Radiation    Plan ID Energy Fractions Dose per Fraction (cGy) Dose Correction (cGy) Total Dose Delivered (cGy) Elapsed Days   SRT R Frontal 6X-FFF 5 / 5 600 0 3,000 9         5/18/2023 - 7/20/2023 Chemotherapy    cyanocobalamin, 1,000 mcg, Intramuscular, Once, 3 of 3 cycles  Administration: 1,000 mcg (6/29/2023), 1,000 mcg (5/18/2023), 1,000 mcg (7/20/2023)  alteplase (CATHFLO), 2 mg, Intracatheter, Every 1 Minute as needed, 4 of 4 cycles  pegfilgrastim (NEULASTA ONPRO), 6 mg, Subcutaneous, Once, 3 of 3 cycles  Administration: 6 mg (6/8/2023), 6 mg (6/29/2023), 6 mg (7/20/2023)  fosaprepitant (EMEND) IVPB, 150 mg, Intravenous, Once, 4 of 4 cycles  Administration: 150 mg (5/18/2023), 150 mg (6/29/2023), 150 mg (7/20/2023), 150 mg (6/8/2023)  nivolumab (OPDIVO) IVPB, 360 mg (150 % of original dose 240 mg), Intravenous, Once, 4 of 4 cycles  Dose modification: 360 mg (original dose 240 mg, Cycle 1, Reason: Dose modified as per discussion with consulting physician)  Administration: 360 mg (5/18/2023), 360 mg (6/8/2023), 360 mg (6/29/2023), 360 mg (7/20/2023)  CARBOplatin (PARAPLATIN) IVPB (GOG AUC DOSING), 642.5 mg, Intravenous, Once, 4 of 4 cycles  Administration: 642.5 mg (5/18/2023), 642.5 mg  (6/29/2023), 566.5 mg (7/20/2023), 650 mg (6/8/2023)  pemetrexed (ALIMTA) chemo infusion, 980 mg, Intravenous, Once, 4 of 4 cycles  Administration: 1,000 mg (5/18/2023), 1,000 mg (6/29/2023), 1,000 mg (7/20/2023), 1,000 mg (6/8/2023)     9/1/2023 -  Cancer Staged    Staging form: Lung, AJCC 8th Edition  - Pathologic stage from 9/1/2023: Stage JAY (pT2b, pN2, cM1b) - Signed by Treva Bah PA-C on 9/20/2023  Histopathologic type: Adenocarcinoma, NOS  Stage prefix: Initial diagnosis  Histologic grade (G): G3  Histologic grading system: 4 grade system       9/1/2023 Surgery    Right robotic converted to open upper lobectomy      9/1/2023 Biopsy    A.  Lung, right upper lobe:     - Invasive poorly differentiated solid adenocarcinoma of the lung, 4.4 cm.     - Tumor invades into, but not through, the visceral pleura (PL1), confirmed by special VVG stains.     - Lymphatic channel invasion by tumor identified.     - Metastatic carcinoma present in three of thirteen lymph nodes (3/13).       -- Rare fibrotic granulomas present.     - All margins are negative for tumor.     - Emphysematous changes.     B.  Lymph node, level 8:     - Negative for malignancy (0/1).     C.  Lymph node, level 7, #1:     - Negative for malignancy (0/1).     D.  Lymph node, level 4R:     - Negative for malignancy (0/1).     E.  Lymph node, level 4R, #2:     - Fibrovascular adipose tissue; negative for malignancy.     - No lymphoid tissue identified.     F.  Lymph node, level 2R, #1:     - Metastatic carcinoma present in one of three lymph nodes (1/3).     G.  Lymph node, level 2R, #2:     - Negative for malignancy (0/1).     H.  Lymph node, level 11 posterior:     - Single lymph node positive for metastatic carcinoma (1/1).     I.  Lymph node, portion of level 12 on artery:     - Negative for malignancy (0/1).     - Non-caseating granulomatous inflammation present.        -- Special stains for acid fast bacilli and fungal organisms are  negative.        10/11/2023 - 10/11/2023 Chemotherapy    alteplase (CATHFLO), 2 mg, Intracatheter, Every 1 Minute as needed, 0 of 6 cycles  nivolumab (OPDIVO) IVPB, 240 mg, Intravenous, Once, 0 of 6 cycles     10/13/2023 -  Chemotherapy    alteplase (CATHFLO), 2 mg, Intracatheter, Every 1 Minute as needed, 5 of 8 cycles  nivolumab (OPDIVO) IVPB, 480 mg, Intravenous, Once, 5 of 8 cycles  Administration: 480 mg (10/13/2023), 480 mg (11/10/2023), 480 mg (12/8/2023), 480 mg (1/5/2024), 480 mg (2/23/2024)         Interval history:    ROS: Review of Systems   Constitutional:  Negative for chills and fever.   HENT:  Negative for ear pain and sore throat.    Eyes:  Negative for pain and visual disturbance.   Respiratory:  Positive for shortness of breath. Negative for cough.    Cardiovascular:  Negative for chest pain and palpitations.   Gastrointestinal:  Negative for abdominal pain and vomiting.   Genitourinary:  Negative for dysuria and hematuria.   Musculoskeletal:  Negative for arthralgias and back pain.   Skin:  Negative for color change and rash.   Neurological:  Positive for numbness. Negative for seizures and syncope.   Psychiatric/Behavioral:  Positive for sleep disturbance.    All other systems reviewed and are negative.      Past Medical History:   Diagnosis Date    Brain compression (HCC) 03/20/2023    Brain mass 03/20/2023    Cancer (HCC) 2001    Receint lung and brain lesions and prostate cancer in 2001    Cerebral edema (HCC) 03/20/2023    CVA (cerebral vascular accident) (HCC) 08/12/2021    Hypertension     Prostate cancer (HCC) 2001    Rectal bleeding     Stroke (HCC)     2011         Past Surgical History:   Procedure Laterality Date    COLONOSCOPY      CRANIOTOMY Right 3/23/2023    Procedure: Right frontal CRANIOTOMY IMAGE-GUIDED FOR TUMOR;  Surgeon: Clark Carrillo MD;  Location: [facility];  Service: Neurosurgery    IR PORT PLACEMENT  4/27/2023    [facility] INCL FLUOR GDNCE DX W/CELL WASHG SPX N/A  2023    Procedure: BRONCHOSCOPY FLEXIBLE;  Surgeon: Kalia Sparrow MD;  Location: [facility];  Service: Thoracic    NM CYSTOURETHROSCOPY N/A 3/23/2023    Procedure: EUA, DEL CASTILLO INSERTION;  Surgeon: Ajay Piedra MD;  Location: [facility];  Service: Urology    NM THORACOSCOPY W/LOBECTOMY SINGLE LOBE Right 2023    Procedure: robotic assisted converted to open right upper lobectomy, lymph node dissection;  Surgeon: Kalia Sparrow MD;  [facility];  Service: Thoracic    PROSTATECTOMY  2001    THORACOTOMY Right 2023    Procedure: right thoracotomy with Cryo-Ablation;  Surgeon: Kalia Sparrow MD;  Location[facility];  Service: Thoracic    TONSILLECTOMY         Social History     Socioeconomic History    Marital status: /Civil Union     Spouse name: None    Number of children: None    Years of education: None    Highest education level: None   Occupational History    None   Tobacco Use    Smoking status: Former     Current packs/day: 0.00     Average packs/day: 1 pack/day for 15.0 years (15.0 ttl pk-yrs)     Types: Cigarettes     Start date:      Quit date:      Years since quittin.2     Passive exposure: Never    Smokeless tobacco: Never    Tobacco comments:     i quit when i was 28. not sure of exact dates   Vaping Use    Vaping status: Never Used   Substance and Sexual Activity    Alcohol use: Yes     Alcohol/week: 6.0 standard drinks of alcohol     Types: 6 Cans of beer per week     Comment: socially  last drink 3/23    Drug use: Yes     Types: Marijuana     Comment: gummies foro nausea with chemo    Sexual activity: Yes     Partners: Female     Birth control/protection: None   Other Topics Concern    None   Social History Narrative    None     Social Determinants of Health     Financial Resource Strain: Not on file   Food Insecurity: No Food Insecurity (2023)    Hunger Vital Sign     Worried About Running Out of Food in the Last Year: Never true     Ran Out of  Food in the Last Year: Never true   Transportation Needs: No Transportation Needs (2023)    PRAPARE - Transportation     Lack of Transportation (Medical): No     Lack of Transportation (Non-Medical): No   Physical Activity: Not on file   Stress: Not on file   Social Connections: Not on file   Intimate Partner Violence: Not on file   Housing Stability: Low Risk  (2023)    Housing Stability Vital Sign     Unable to Pay for Housing in the Last Year: No     Number of Places Lived in the Last Year: 1     Unstable Housing in the Last Year: No       Family History   Problem Relation Age of Onset    Dementia Mother             Breast cancer Sister             Prostate cancer Brother         Received Radiation       No Known Allergies      Current Outpatient Medications:     amLODIPine (NORVASC) 10 mg tablet, Take 1 tablet (10 mg total) by mouth daily, Disp: 90 tablet, Rfl: 0    dexamethasone (DECADRON) 1 mg tablet, Start 1mg tabs after completion of all 2mg tablets. Do not start before 2024., Disp: 7 tablet, Rfl: 0    gabapentin (Neurontin) 300 mg capsule, Take 1 capsule (300 mg total) by mouth 3 (three) times a day, Disp: 90 capsule, Rfl: 1    losartan (COZAAR) 50 mg tablet, Take 1 tablet (50 mg total) by mouth daily, Disp: 90 tablet, Rfl: 0    pantoprazole (PROTONIX) 40 mg tablet, Take 1 tablet (40 mg total) by mouth daily, Disp: 60 tablet, Rfl: 0    atorvastatin (LIPITOR) 40 mg tablet, Take 1 tablet (40 mg total) by mouth daily after dinner, Disp: 30 tablet, Rfl: 6    docusate sodium (COLACE) 100 mg capsule, Take 1 capsule (100 mg total) by mouth daily Do not start before 2023. (Patient not taking: Reported on 2023), Disp: 20 capsule, Rfl: 0    ondansetron (ZOFRAN) 8 mg tablet, Take 1 tablet (8 mg total) by mouth every 8 (eight) hours as needed for nausea or vomiting (Patient not taking: Reported on 10/20/2023), Disp: 20 tablet, Rfl: 3      Physical Exam:  /88  "(BP Location: Left arm, Patient Position: Sitting, Cuff Size: Adult)   Pulse 94   Temp 97.7 °F (36.5 °C)   Resp 17   Ht 5' 9\" (1.753 m)   Wt 88 kg (194 lb)   SpO2 98%   BMI 28.65 kg/m²     Physical Exam  Constitutional:       Appearance: He is well-developed.   HENT:      Head: Normocephalic and atraumatic.      Nose: Nose normal.   Eyes:      General: No scleral icterus.        Right eye: No discharge.         Left eye: No discharge.      Conjunctiva/sclera: Conjunctivae normal.      Pupils: Pupils are equal, round, and reactive to light.   Neck:      Thyroid: No thyromegaly.      Trachea: No tracheal deviation.   Cardiovascular:      Rate and Rhythm: Normal rate and regular rhythm.      Heart sounds: Normal heart sounds. No murmur heard.     No friction rub.   Pulmonary:      Effort: Pulmonary effort is normal. No respiratory distress.      Breath sounds: Normal breath sounds. No wheezing or rales.   Chest:      Chest wall: No tenderness.   Abdominal:      General: There is no distension.      Palpations: Abdomen is soft. There is no hepatomegaly or splenomegaly.      Tenderness: There is no abdominal tenderness. There is no guarding or rebound.   Musculoskeletal:         General: No tenderness or deformity. Normal range of motion.      Cervical back: Normal range of motion and neck supple.   Lymphadenopathy:      Cervical: No cervical adenopathy.   Skin:     General: Skin is warm and dry.      Coloration: Skin is not pale.      Findings: No erythema or rash.   Neurological:      Mental Status: He is alert and oriented to person, place, and time.      Cranial Nerves: No cranial nerve deficit.      Coordination: Coordination normal.      Deep Tendon Reflexes: Reflexes are normal and symmetric.   Psychiatric:         Behavior: Behavior normal.         Thought Content: Thought content normal.         Judgment: Judgment normal.           Labs:  Lab Results   Component Value Date    WBC 9.76 02/22/2024    HGB " 15.8 02/22/2024    HCT 48.9 02/22/2024    MCV 95 02/22/2024     (L) 02/22/2024     Lab Results   Component Value Date     05/10/2018    K 3.7 02/22/2024     02/22/2024    CO2 29 02/22/2024    ANIONGAP 11.6 05/10/2018    BUN 23 02/22/2024    CREATININE 0.84 02/22/2024    GLUCOSE 190 (H) 09/01/2023    GLUF 116 (H) 06/27/2023    CALCIUM 8.7 02/22/2024    AST 23 02/22/2024    ALT 32 02/22/2024    ALKPHOS 46 02/22/2024    PROT 7.2 05/10/2018    BILITOT 0.8 05/10/2018    EGFR 88 02/22/2024     Lab Results   Component Value Date    TSH 3.73 09/12/2022       Patient voiced understanding and agreement in the above discussion. Aware to contact our office with questions/symptoms in the interim.

## 2024-03-08 ENCOUNTER — TELEPHONE (OUTPATIENT)
Dept: HEMATOLOGY ONCOLOGY | Facility: CLINIC | Age: 69
End: 2024-03-08

## 2024-03-08 NOTE — TELEPHONE ENCOUNTER
Appointment Change  Cancel, Reschedule, Change to Virtual      Who are you speaking with? Patient   If it is not the patient, is the caller listed on the communication consent form? N/A   Which provider is the appointment scheduled with? Dr. Sparrow   When was the original appointment scheduled?    Please list date and time 3/20/24 @ 820   At which location is the appointment scheduled to take place? [place]   Was the appointment rescheduled?     Was the appointment changed from an in person visit to a virtual visit?    If so, please list the details of the change. 4/10/24 @ 140   What is the reason for the appointment change? Wants it for after PET on 3/25/24       Was STAR transport scheduled? No   Does STAR transport need to be scheduled for the new visit (if applicable) No   Does the patient need an infusion appointment rescheduled? No   Does the patient have an upcoming infusion appointment scheduled? If so, when? No   Is the patient undergoing chemotherapy? No   For appointments cancelled with less than 24 hours:  Was the no-show policy reviewed? N/A

## 2024-03-13 ENCOUNTER — TELEPHONE (OUTPATIENT)
Dept: HEMATOLOGY ONCOLOGY | Facility: CLINIC | Age: 69
End: 2024-03-13

## 2024-03-13 NOTE — TELEPHONE ENCOUNTER
Patient Call    Who are you speaking with? Spouse    If it is not the patient, are they listed on an active communication consent form? Yes   What is the reason for this call? Patients wife states he is out of breathe a lot and wheezing and fatigued. She is unsure what she should do   Does this require a call back? Yes   If a call back is required, please list best call back number [phone]   If a call back is required, advise that a message will be forwarded to their care team and someone will return their call as soon as possible.   Did you relay this information to the patient? Yes

## 2024-03-13 NOTE — TELEPHONE ENCOUNTER
Patient Call    Who are you speaking with? Spouse    If it is not the patient, are they listed on an active communication consent form? Yes   What is the reason for this call? Pt has a headache, dizziness, runny nose   Does this require a call back? Yes   If a call back is required, please list [facility] call back number [phone]    If a call back is required, advise that a message will be forwarded to their care team and someone will return their call as soon as possible.   Did you relay this information to the patient? Yes

## 2024-03-13 NOTE — TELEPHONE ENCOUNTER
I spoke with Dr. Sparrow and reviewed all of patient's symptoms he has recommended patient see PCP or oncology. I spoke with Eugene and gave him the message.

## 2024-03-13 NOTE — TELEPHONE ENCOUNTER
Per patient's wife and she related patient having increased SOB and wheezing. She's out of the area and will be seeing patient shortly. She will call with update of symptoms.

## 2024-03-21 ENCOUNTER — HOSPITAL ENCOUNTER (OUTPATIENT)
Dept: INFUSION CENTER | Facility: HOSPITAL | Age: 69
End: 2024-03-21

## 2024-03-22 ENCOUNTER — TELEPHONE (OUTPATIENT)
Dept: RADIATION ONCOLOGY | Facility: HOSPITAL | Age: 69
End: 2024-03-22

## 2024-03-22 ENCOUNTER — HOSPITAL ENCOUNTER (OUTPATIENT)
Dept: INFUSION CENTER | Facility: HOSPITAL | Age: 69
Discharge: HOME | End: 2024-03-22
Attending: INTERNAL MEDICINE

## 2024-03-22 DIAGNOSIS — C79.31 METASTASIS TO BRAIN (HCC): Primary | ICD-10-CM

## 2024-03-22 RX ORDER — DEXAMETHASONE 2 MG/1
2 TABLET ORAL 2 TIMES DAILY WITH MEALS
Qty: 60 TABLET | Refills: 0 | Status: SHIPPED | OUTPATIENT
Start: 2024-03-22

## 2024-03-22 NOTE — PROGRESS NOTES
Received a call from the patient regarding recurrent neurologic symptoms (weakness) similar to in 1/2024. He has an MRI Brain scheduled for early next week. No other concerning symptoms, no seizures. Will order dexamethasone 2mg BID and proceed with MRI as scheduled. I will plan to see him back shortly after MRI Brain for evaluation.     James Contreras MD  Dept of Radiation Oncology

## 2024-03-22 NOTE — TELEPHONE ENCOUNTER
Patient's SO called, they are currently on their way back from [place] and pt is experiencing L sided mobility issues.  He had this before, it was a side effect of his immunotherapy and SO is assuming this is the same thing and is asking if he should go back on the high dose steroids he took before.  They won't be back home until very late tonight but called because she knew we wouldn't be in over the weekend.

## 2024-03-25 ENCOUNTER — HOSPITAL ENCOUNTER (OUTPATIENT)
Dept: RADIOLOGY | Age: 69
Discharge: HOME | End: 2024-03-25
Payer: COMMERCIAL

## 2024-03-25 DIAGNOSIS — C79.9 METASTATIC ADENOCARCINOMA (HCC): ICD-10-CM

## 2024-03-25 DIAGNOSIS — C34.91 CARCINOMA OF RIGHT LUNG (HCC): ICD-10-CM

## 2024-03-25 LAB — GLUCOSE SERPL-MCNC: 109 MG/DL (ref 65–140)

## 2024-03-25 PROCEDURE — A9552 F18 FDG: HCPCS

## 2024-03-25 PROCEDURE — 82948 REAGENT STRIP/BLOOD GLUCOSE: CPT

## 2024-03-25 PROCEDURE — 78815 PET IMAGE W/CT SKULL-THIGH: CPT

## 2024-03-26 ENCOUNTER — HOSPITAL ENCOUNTER (OUTPATIENT)
Dept: MRI IMAGING | Facility: HOSPITAL | Age: 69
Discharge: HOME | End: 2024-03-26
Payer: COMMERCIAL

## 2024-03-26 DIAGNOSIS — C79.31 METASTASIS TO BRAIN (HCC): ICD-10-CM

## 2024-03-26 PROCEDURE — A9585 GADOBUTROL INJECTION: HCPCS | Performed by: STUDENT IN AN ORGANIZED HEALTH CARE EDUCATION/TRAINING PROGRAM

## 2024-03-26 PROCEDURE — 70553 MRI BRAIN STEM W/O & W/DYE: CPT

## 2024-03-26 RX ORDER — GADOBUTROL 604.72 MG/ML
10 INJECTION INTRAVENOUS
Status: COMPLETED | OUTPATIENT
Start: 2024-03-26 | End: 2024-03-26

## 2024-03-26 RX ADMIN — GADOBUTROL 10 ML: 604.72 INJECTION INTRAVENOUS at 17:41

## 2024-03-27 ENCOUNTER — TELEPHONE (OUTPATIENT)
Dept: HEMATOLOGY ONCOLOGY | Facility: CLINIC | Age: 69
End: 2024-03-27

## 2024-03-27 ENCOUNTER — HOSPITAL ENCOUNTER (OUTPATIENT)
Dept: INFUSION CENTER | Facility: HOSPITAL | Age: 69
Discharge: HOME | End: 2024-03-27
Attending: INTERNAL MEDICINE

## 2024-03-27 DIAGNOSIS — T45.1X5A CHEMOTHERAPY-INDUCED NEUTROPENIA (HCC): ICD-10-CM

## 2024-03-27 DIAGNOSIS — Z12.5 SCREENING FOR PROSTATE CANCER: ICD-10-CM

## 2024-03-27 DIAGNOSIS — E78.00 HYPERCHOLESTEROLEMIA: ICD-10-CM

## 2024-03-27 DIAGNOSIS — D70.1 CHEMOTHERAPY-INDUCED NEUTROPENIA (HCC): ICD-10-CM

## 2024-03-27 DIAGNOSIS — C34.91 CARCINOMA OF RIGHT LUNG (HCC): ICD-10-CM

## 2024-03-27 DIAGNOSIS — C79.9 METASTATIC ADENOCARCINOMA (HCC): ICD-10-CM

## 2024-03-27 NOTE — TELEPHONE ENCOUNTER
Received a phone call from pt's wife to ask if treatment can be delayed to after OV with Dr howard. Pt was in [place] last week due to death of wife's mother and pt had some mobility issues on his left side while there. Pt phoned Dr Contreras and Rx for Dex 2 mg po BID was prescribed. MRI of brain done on Mon but not resulted. Phoned reading room to have MRI read and R/S treatment to 4/4/24. OV with Dr howard is 4/2/24

## 2024-03-27 NOTE — PROGRESS NOTES
Patients treatment will be delayed 1 week after patients wife spoke to Elaine Kay RN with her concerns. No central labs completed today, patient rescheduled for 4-3-24 at 830.

## 2024-03-27 NOTE — PROGRESS NOTES
Patient arrived to the infusion center with his wife. His wife expressed concerns over symptoms patient was experiencing after his last treatment. He was experiencing numbness and mobility issues on his left side. He had an MRI completed recently. His wife wanted to discuss those results with Dr Langford prior to another treatment. Teams message sent to Elaine Kay RN that patient would like to speak with her concerns prior to having blood work drawn today. Elaine called into the infusion center to speak to the patients wife

## 2024-03-28 ENCOUNTER — HOSPITAL ENCOUNTER (OUTPATIENT)
Dept: INFUSION CENTER | Facility: HOSPITAL | Age: 69
Discharge: HOME | End: 2024-03-28
Attending: INTERNAL MEDICINE

## 2024-03-31 DIAGNOSIS — I10 HYPERTENSION: ICD-10-CM

## 2024-03-31 DIAGNOSIS — E78.00 HYPERCHOLESTEROLEMIA: ICD-10-CM

## 2024-03-31 DIAGNOSIS — Z76.0 MEDICATION REFILL: ICD-10-CM

## 2024-04-02 ENCOUNTER — DOCUMENTATION (OUTPATIENT)
Dept: HEMATOLOGY ONCOLOGY | Facility: CLINIC | Age: 69
End: 2024-04-02

## 2024-04-02 ENCOUNTER — OFFICE VISIT (OUTPATIENT)
Dept: HEMATOLOGY ONCOLOGY | Facility: CLINIC | Age: 69
End: 2024-04-02
Payer: COMMERCIAL

## 2024-04-02 ENCOUNTER — TELEPHONE (OUTPATIENT)
Dept: HEMATOLOGY ONCOLOGY | Facility: CLINIC | Age: 69
End: 2024-04-02

## 2024-04-02 VITALS
DIASTOLIC BLOOD PRESSURE: 80 MMHG | RESPIRATION RATE: 17 BRPM | HEART RATE: 89 BPM | WEIGHT: 194 LBS | TEMPERATURE: 97.9 F | HEIGHT: 69 IN | SYSTOLIC BLOOD PRESSURE: 136 MMHG | OXYGEN SATURATION: 94 % | BODY MASS INDEX: 28.73 KG/M2

## 2024-04-02 DIAGNOSIS — C34.91 CARCINOMA OF RIGHT LUNG (HCC): Primary | ICD-10-CM

## 2024-04-02 DIAGNOSIS — C79.31 METASTASIS TO BRAIN (HCC): ICD-10-CM

## 2024-04-02 DIAGNOSIS — C79.9 METASTATIC ADENOCARCINOMA (HCC): ICD-10-CM

## 2024-04-02 PROCEDURE — 99215 OFFICE O/P EST HI 40 MIN: CPT | Performed by: INTERNAL MEDICINE

## 2024-04-02 RX ORDER — LOSARTAN POTASSIUM 50 MG/1
50 TABLET ORAL DAILY
Qty: 90 TABLET | Refills: 0 | Status: SHIPPED | OUTPATIENT
Start: 2024-04-02

## 2024-04-02 RX ORDER — ATORVASTATIN CALCIUM 40 MG/1
40 TABLET, FILM COATED ORAL
Qty: 30 TABLET | Refills: 0 | Status: SHIPPED | OUTPATIENT
Start: 2024-04-02 | End: 2024-05-02

## 2024-04-02 NOTE — PROGRESS NOTES
Hematology/Oncology Outpatient Follow-up  Paras Pitts 68 y.o. male 1955 [id number]    Date:  4/2/2024        Assessment and Plan:  1. Carcinoma of right lung (HCC)  Clinical stage JAY at diagnosis was (cT3, cN1, cM1b) S/P surgical resection of the oligometastatic brain lesion followed by SRS.  Status post 4 cycles of neoadjuvant chemotherapy carboplatin, Alimta and nivolumab 7/2023.  Status post right upper lobe lobectomy 9/1/2023. The final pathology was pT2b, pN2, G3.  R0.   The molecular evaluation through [facility] showed PD-L1 of 100% with high TMB of 10.  No oncogenic  mutation was found.   Started on adjuvant immunotherapy with nivolumab 480 mg on every 4-week basis on 10/13/2023.    The patient received 5 monthly doses of adjuvant nivolumab so far.  I did discuss the result of the recent PET scan from 3/25/2024 with the patient and his wife.  We did review the imaging extensively.  He unfortunately seems to have mediastinal hypermetabolic adenopathy compatible with metastatic disease which needs to be further investigated with EBUS guided biopsy by the thoracic surgical team.  Subsequently, we will make a decision regarding treatment option once the recurrence of his non-small cell lung cancer is confirmed.    2. Metastatic adenocarcinoma (HCC)  His case will be discussed with the thoracic surgical team and radiation oncology team for treatment options.      3. Metastasis to brain (HCC)  I did also briefly discussed the result of the brain MRI which was done on 3/25/2024 which showed some enhancement at the resected right frontal mass bed with vasogenic edema.  The etiology could be due to posttreatment changes versus recurrent disease.  The neurological symptoms seem to be better with the tapering dose of Decadron.  He will be discussing the brain MRI imaging with Dr. Contreras from the radiation oncology team tomorrow.          HPI:  The patient came in today for a follow-up visit accompanied by his  wife.  He was started on tapering dose of Decadron by the radiation oncology team after he was found to have vasogenic edema around the resected cavity in the right frontal region of the brain.  He stated that once he was started on the steroids the left upper and lower extremity numbness and weakness improved.  He is currently on 2 mg of Decadron twice a day.  The MRI of the brain which was done on 3/26/2024 showed:  IMPRESSION:     -Status post treatment resection and stereotactic radiosurgery of right frontal mass. Continued increase in enhancement and vasogenic edema around the resection cavity. Question mild restricted diffusion and increased cerebral blood volume along the   superior margin however no definite increased ASL signal. Differential includes posttreatment changes versus recurrent disease. Continued attention on follow-up.    He also had PET CT scan 3/25/2024 which showed:  IMPRESSION:     1. FDG-avid mediastinal lymphadenopathy as seen on recent CT compatible with metastasis. No residual abnormality at the left upper trachea as previously seen likely secretions.  2.  No findings for hypermetabolic metastatic disease to the neck, abdomen and pelvis.    Oncology History Overview Note   68 year old man with Stage JAY ([id number]) lung adenocarcinoma s/p resection of a right frontal metastasis. On 4/28/23 he completed a course of postoperative SRT to a dose of 3000cGy in 5 fractions. He thereafter underwent neoadjuvant chemoimmunotherapy (carbo/taxol/nivo from 5/18/23-7/20/23) followed by right thoracotomy with RUL lobectomy and extensive mediastinal LN dissection. He was not recommended for adjuvant RT. He was last seen on 1/24/24 and presents for 3 month follow up and MRI review.        1/25/24 Hematology Oncology - Dr. 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    Started on  tapering dose of Decadron.  Will need to delay the immunotherapy for somewhere between 3 to 4 weeks while he is on high-dose Decadron  CT scan of the chest abdomen pelvis prior to his next visit somewhere in March.  Repeat MRI earlier this month which appears overall stable.  Recent MRI of the brain on 1/18/2023 showed vasogenic edema as stated above.    3/5/24 CT chest abdomen pelvis w  contrast  IMPRESSION:  There is new lower right paratracheal lymphadenopathy in the mediastinum,, measuring 1.9 cm x 2.2 cm. (Level 4R) this may be a neoplastic lymph node or may drug-induced granulomatous reaction, needs further characterization  Nodular soft tissue seen along the left lateral upper thoracic trachea, image 25 series 2, indeterminate may be a focal lesion or adherent secretions. Consider follow-up CT at 3 months or characterization with bronchoscopy  Postsurgical changes from the resection of the right upper lobe mass    3/7/24 Hematology Oncology - Dr. Langford  Continues on current treatment with nivolumab q 4 weeks   Recent CT scan of the chest abdomen pelvis 3/5/2024 and showed a new lower right paratracheal lymphadenopathy measuring 2.2 cm close to the mediastinum he also seems to have nodular soft tissue along the left lateral upper thoracic trachea of unknown etiology.   PET CT for further eval  Will see thoracic surgical team for follow-up 3/20/24    3/25/24 NM PET CT skull base to mid thigh  IMPRESSION:  1. FDG-avid mediastinal lymphadenopathy as seen on recent CT compatible with metastasis. No residual abnormality at the left upper trachea as previously seen likely secretions.  2.  No findings for hypermetabolic metastatic disease to the neck, abdomen and pelvis.    3/26/24 MRI Brain BT w wo contrast  IMPRESSION:  -Status post treatment resection and stereotactic radiosurgery of right frontal mass. Continued increase in enhancement and vasogenic edema around the resection cavity. Question mild restricted  diffusion and increased cerebral blood volume along the   superior margin however no definite increased ASL signal. Differential includes posttreatment changes versus recurrent disease. Continued attention on follow-up.  -New 1.5 mm leftward subfalcine herniation.    24 Hematology Oncology - Dr. Langford      Upcomin/10/24 Thoracic Surgery - Dr. Sparrow       Metastatic adenocarcinoma (HCC)    Initial Diagnosis    Metastatic adenocarcinoma (HCC)     3/23/2023 Biopsy    Brain, right frontal mass (biopsy):  - Metastatic non-small cell carcinoma     Comment:  - Tumor cells stain diffusely for CAM5.2, CKAE1/3, CK7, TTF1 and NapsinA with absent CK20, p63, CK5/6, p40 expression.  This immunopanel favors a metastatic lung adenocarcinoma.       2023 - 2023 Chemotherapy    cyanocobalamin, 1,000 mcg, Intramuscular, Once, 3 of 3 cycles  Administration: 1,000 mcg (2023), 1,000 mcg (2023), 1,000 mcg (2023)  alteplase (CATHFLO), 2 mg, Intracatheter, Every 1 Minute as needed, 4 of 4 cycles  pegfilgrastim (NEULASTA ONPRO), 6 mg, Subcutaneous, Once, 3 of 3 cycles  Administration: 6 mg (2023), 6 mg (2023), 6 mg (2023)  fosaprepitant (EMEND) IVPB, 150 mg, Intravenous, Once, 4 of 4 cycles  Administration: 150 mg (2023), 150 mg (2023), 150 mg (2023), 150 mg (2023)  nivolumab (OPDIVO) IVPB, 360 mg (150 % of original dose 240 mg), Intravenous, Once, 4 of 4 cycles  Dose modification: 360 mg (original dose 240 mg, Cycle 1, Reason: Dose modified as per discussion with consulting physician)  Administration: 360 mg (2023), 360 mg (2023), 360 mg (2023), 360 mg (2023)  CARBOplatin (PARAPLATIN) IVPB (GOG AUC DOSING), 642.5 mg, Intravenous, Once, 4 of 4 cycles  Administration: 642.5 mg (2023), 642.5 mg (2023), 566.5 mg (2023), 650 mg (2023)  pemetrexed (ALIMTA) chemo infusion, 980 mg, Intravenous, Once, 4 of 4 cycles  Administration: 1,000  mg (5/18/2023), 1,000 mg (6/29/2023), 1,000 mg (7/20/2023), 1,000 mg (6/8/2023)     10/11/2023 - 10/11/2023 Chemotherapy    alteplase (CATHFLO), 2 mg, Intracatheter, Every 1 Minute as needed, 0 of 6 cycles  nivolumab (OPDIVO) IVPB, 240 mg, Intravenous, Once, 0 of 6 cycles     10/13/2023 -  Chemotherapy    alteplase (CATHFLO), 2 mg, Intracatheter, Every 1 Minute as needed, 5 of 8 cycles  nivolumab (OPDIVO) IVPB, 480 mg, Intravenous, Once, 5 of 8 cycles  Administration: 480 mg (10/13/2023), 480 mg (11/10/2023), 480 mg (12/8/2023), 480 mg (1/5/2024), 480 mg (2/23/2024)     Carcinoma of right lung (HCC)   4/13/2023 Initial Diagnosis    Carcinoma of right lung (HCC)     4/13/2023 -  Cancer Staged    Staging form: Lung, AJCC 8th Edition  - Clinical: Stage JAY (cT3, cN1, cM1b) - Signed by James Contreras MD on 4/13/2023 4/19/2023 - 4/28/2023 Radiation    Plan ID Energy Fractions Dose per Fraction (cGy) Dose Correction (cGy) Total Dose Delivered (cGy) Elapsed Days   SRT R Frontal 6X-FFF 5 / 5 600 0 3,000 9         5/18/2023 - 7/20/2023 Chemotherapy    cyanocobalamin, 1,000 mcg, Intramuscular, Once, 3 of 3 cycles  Administration: 1,000 mcg (6/29/2023), 1,000 mcg (5/18/2023), 1,000 mcg (7/20/2023)  alteplase (CATHFLO), 2 mg, Intracatheter, Every 1 Minute as needed, 4 of 4 cycles  pegfilgrastim (NEULASTA ONPRO), 6 mg, Subcutaneous, Once, 3 of 3 cycles  Administration: 6 mg (6/8/2023), 6 mg (6/29/2023), 6 mg (7/20/2023)  fosaprepitant (EMEND) IVPB, 150 mg, Intravenous, Once, 4 of 4 cycles  Administration: 150 mg (5/18/2023), 150 mg (6/29/2023), 150 mg (7/20/2023), 150 mg (6/8/2023)  nivolumab (OPDIVO) IVPB, 360 mg (150 % of original dose 240 mg), Intravenous, Once, 4 of 4 cycles  Dose modification: 360 mg (original dose 240 mg, Cycle 1, Reason: Dose modified as per discussion with consulting physician)  Administration: 360 mg (5/18/2023), 360 mg (6/8/2023), 360 mg (6/29/2023), 360 mg (7/20/2023)  CARBOplatin  (PARAPLATIN) IVPB (GOG AUC DOSING), 642.5 mg, Intravenous, Once, 4 of 4 cycles  Administration: 642.5 mg (5/18/2023), 642.5 mg (6/29/2023), 566.5 mg (7/20/2023), 650 mg (6/8/2023)  pemetrexed (ALIMTA) chemo infusion, 980 mg, Intravenous, Once, 4 of 4 cycles  Administration: 1,000 mg (5/18/2023), 1,000 mg (6/29/2023), 1,000 mg (7/20/2023), 1,000 mg (6/8/2023)     9/1/2023 -  Cancer Staged    Staging form: Lung, AJCC 8th Edition  - Pathologic stage from 9/1/2023: Stage JAY (pT2b, pN2, cM1b) - Signed by Treva Bah PA-C on 9/20/2023  Histopathologic type: Adenocarcinoma, NOS  Stage prefix: Initial diagnosis  Histologic grade (G): G3  Histologic grading system: 4 grade system       9/1/2023 Surgery    Right robotic converted to open upper lobectomy      9/1/2023 Biopsy    A.  Lung, right upper lobe:     - Invasive poorly differentiated solid adenocarcinoma of the lung, 4.4 cm.     - Tumor invades into, but not through, the visceral pleura (PL1), confirmed by special VVG stains.     - Lymphatic channel invasion by tumor identified.     - Metastatic carcinoma present in three of thirteen lymph nodes (3/13).       -- Rare fibrotic granulomas present.     - All margins are negative for tumor.     - Emphysematous changes.     B.  Lymph node, level 8:     - Negative for malignancy (0/1).     C.  Lymph node, level 7, #1:     - Negative for malignancy (0/1).     D.  Lymph node, level 4R:     - Negative for malignancy (0/1).     E.  Lymph node, level 4R, #2:     - Fibrovascular adipose tissue; negative for malignancy.     - No lymphoid tissue identified.     F.  Lymph node, level 2R, #1:     - Metastatic carcinoma present in one of three lymph nodes (1/3).     G.  Lymph node, level 2R, #2:     - Negative for malignancy (0/1).     H.  Lymph node, level 11 posterior:     - Single lymph node positive for metastatic carcinoma (1/1).     I.  Lymph node, portion of level 12 on artery:     - Negative for malignancy (0/1).     -  Non-caseating granulomatous inflammation present.        -- Special stains for acid fast bacilli and fungal organisms are negative.        10/11/2023 - 10/11/2023 Chemotherapy    alteplase (CATHFLO), 2 mg, Intracatheter, Every 1 Minute as needed, 0 of 6 cycles  nivolumab (OPDIVO) IVPB, 240 mg, Intravenous, Once, 0 of 6 cycles     10/13/2023 -  Chemotherapy    alteplase (CATHFLO), 2 mg, Intracatheter, Every 1 Minute as needed, 5 of 8 cycles  nivolumab (OPDIVO) IVPB, 480 mg, Intravenous, Once, 5 of 8 cycles  Administration: 480 mg (10/13/2023), 480 mg (11/10/2023), 480 mg (12/8/2023), 480 mg (1/5/2024), 480 mg (2/23/2024)         Interval history:    ROS: Review of Systems   Constitutional:  Negative for chills and fever.   HENT:  Negative for ear pain and sore throat.    Eyes:  Negative for pain and visual disturbance.   Respiratory:  Positive for shortness of breath. Negative for cough.    Cardiovascular:  Negative for chest pain and palpitations.   Gastrointestinal:  Negative for abdominal pain and vomiting.   Genitourinary:  Negative for dysuria and hematuria.   Musculoskeletal:  Negative for arthralgias and back pain.   Skin:  Negative for color change and rash.   Neurological:  Positive for numbness. Negative for seizures and syncope.   All other systems reviewed and are negative.      Past Medical History:   Diagnosis Date    Brain compression (HCC) 03/20/2023    Brain mass 03/20/2023    Cancer (HCC) 2001    Receint lung and brain lesions and prostate cancer in 2001    Cerebral edema (HCC) 03/20/2023    CVA (cerebral vascular accident) (HCC) 08/12/2021    Hypertension     Prostate cancer (HCC) 2001    Rectal bleeding     Stroke (HCC)     2011         Past Surgical History:   Procedure Laterality Date    COLONOSCOPY      CRANIOTOMY Right 3/23/2023    Procedure: Right frontal CRANIOTOMY IMAGE-GUIDED FOR TUMOR;  Surgeon: Clark Carrillo MD;  Location: [facility];  Service: Neurosurgery    IR PORT PLACEMENT   2023    [facility]CELL [facility] SPX N/A 2023    Procedure: BRONCHOSCOPY FLEXIBLE;  Surgeon: Kalia Sparrow MD;  Location: [facility];  Service: Thoracic    HI CYSTOURETHROSCOPY N/A 3/23/2023    Procedure: EUA, DEL CASTILLO INSERTION;  Surgeon: Ajay Piedra MD;  Location: [facility];  Service: Urology    HI THORACOSCOPY W/LOBECTOMY SINGLE LOBE Right 2023    Procedure: robotic assisted converted to open right upper lobectomy, lymph node dissection;  Surgeon: Kalia Sparrow MD;  Location: [facility];  Service: Thoracic    PROSTATECTOMY  2001    THORACOTOMY Right 2023    Procedure: right thoracotomy with Cryo-Ablation;  Surgeon: Kalia Sparrow MD;  Location: [facility];  Service: Thoracic    TONSILLECTOMY         Social History     Socioeconomic History    Marital status: /Civil Union     Spouse name: None    Number of children: None    Years of education: None    Highest education level: None   Occupational History    None   Tobacco Use    Smoking status: Former     Current packs/day: 0.00     Average packs/day: 1 pack/day for 15.0 years (15.0 ttl pk-yrs)     Types: Cigarettes     Start date:      Quit date:      Years since quittin.2     Passive exposure: Never    Smokeless tobacco: Never    Tobacco comments:     i quit when i was 28. not sure of exact dates   Vaping Use    Vaping status: Never Used   Substance and Sexual Activity    Alcohol use: Yes     Alcohol/week: 6.0 standard drinks of alcohol     Types: 6 Cans of beer per week     Comment: socially  last drink 3/23    Drug use: Yes     Types: Marijuana     Comment: gummies foro nausea with chemo    Sexual activity: Yes     Partners: Female     Birth control/protection: None   Other Topics Concern    None   Social History Narrative    None     Social Determinants of Health     Financial Resource Strain: Not on file   Food Insecurity: No Food Insecurity (2023)    Hunger Vital Sign     Worried About  Running Out of Food in the Last Year: Never true     Ran Out of Food in the Last Year: Never true   Transportation Needs: No Transportation Needs (2023)    PRAPARE - Transportation     Lack of Transportation (Medical): No     Lack of Transportation (Non-Medical): No   Physical Activity: Not on file   Stress: Not on file   Social Connections: Not on file   Intimate Partner Violence: Not on file   Housing Stability: Low Risk  (2023)    Housing Stability Vital Sign     Unable to Pay for Housing in the Last Year: No     Number of Places Lived in the Last Year: 1     Unstable Housing in the Last Year: No       Family History   Problem Relation Age of Onset    Dementia Mother             Breast cancer Sister             Prostate cancer Brother         Received Radiation       No Known Allergies      Current Outpatient Medications:     amLODIPine (NORVASC) 10 mg tablet, Take 1 tablet (10 mg total) by mouth daily, Disp: 90 tablet, Rfl: 0    atorvastatin (LIPITOR) 40 mg tablet, Take 1 tablet (40 mg total) by mouth daily after dinner, Disp: 30 tablet, Rfl: 0    dexamethasone (DECADRON) 2 mg tablet, Take 1 tablet (2 mg total) by mouth 2 (two) times a day with meals, Disp: 60 tablet, Rfl: 0    losartan (COZAAR) 50 mg tablet, Take 1 tablet (50 mg total) by mouth daily, Disp: 90 tablet, Rfl: 0    pantoprazole (PROTONIX) 40 mg tablet, Take 1 tablet (40 mg total) by mouth daily, Disp: 60 tablet, Rfl: 0    docusate sodium (COLACE) 100 mg capsule, Take 1 capsule (100 mg total) by mouth daily Do not start before 2023. (Patient not taking: Reported on 2023), Disp: 20 capsule, Rfl: 0    gabapentin (Neurontin) 300 mg capsule, Take 1 capsule (300 mg total) by mouth 3 (three) times a day, Disp: 90 capsule, Rfl: 1    ondansetron (ZOFRAN) 8 mg tablet, Take 1 tablet (8 mg total) by mouth every 8 (eight) hours as needed for nausea or vomiting (Patient not taking: Reported on 10/20/2023), Disp: 20  "tablet, Rfl: 3      Physical Exam:  /80 (BP Location: Left arm, Patient Position: Sitting, Cuff Size: Adult)   Pulse 89   Temp 97.9 °F (36.6 °C)   Resp 17   Ht 5' 9\" (1.753 m)   Wt 88 kg (194 lb)   SpO2 94%   BMI 28.65 kg/m²     Physical Exam  Constitutional:       Appearance: He is well-developed.   HENT:      Head: Normocephalic and atraumatic.      Nose: Nose normal.   Eyes:      General: No scleral icterus.        Right eye: No discharge.         Left eye: No discharge.      Conjunctiva/sclera: Conjunctivae normal.      Pupils: Pupils are equal, round, and reactive to light.   Neck:      Thyroid: No thyromegaly.      Trachea: No tracheal deviation.   Cardiovascular:      Rate and Rhythm: Normal rate and regular rhythm.      Heart sounds: Normal heart sounds. No murmur heard.     No friction rub.   Pulmonary:      Effort: Pulmonary effort is normal. No respiratory distress.      Breath sounds: Normal breath sounds. No wheezing or rales.   Chest:      Chest wall: No tenderness.   Abdominal:      General: There is no distension.      Palpations: Abdomen is soft. There is no hepatomegaly or splenomegaly.      Tenderness: There is no abdominal tenderness. There is no guarding or rebound.   Musculoskeletal:         General: No tenderness or deformity. Normal range of motion.      Cervical back: Normal range of motion and neck supple.   Lymphadenopathy:      Cervical: No cervical adenopathy.   Skin:     General: Skin is warm and dry.      Coloration: Skin is not pale.      Findings: No erythema or rash.   Neurological:      Mental Status: He is alert and oriented to person, place, and time.      Cranial Nerves: No cranial nerve deficit.      Coordination: Coordination normal.      Deep Tendon Reflexes: Reflexes are normal and symmetric.   Psychiatric:         Behavior: Behavior normal.         Thought Content: Thought content normal.         Judgment: Judgment normal.           Labs:  Lab Results "   Component Value Date    WBC 9.76 02/22/2024    HGB 15.8 02/22/2024    HCT 48.9 02/22/2024    MCV 95 02/22/2024     (L) 02/22/2024     Lab Results   Component Value Date     05/10/2018    K 3.7 02/22/2024     02/22/2024    CO2 29 02/22/2024    ANIONGAP 11.6 05/10/2018    BUN 23 02/22/2024    CREATININE 0.84 02/22/2024    GLUCOSE 190 (H) 09/01/2023    GLUF 116 (H) 06/27/2023    CALCIUM 8.7 02/22/2024    AST 23 02/22/2024    ALT 32 02/22/2024    ALKPHOS 46 02/22/2024    PROT 7.2 05/10/2018    BILITOT 0.8 05/10/2018    EGFR 88 02/22/2024     Lab Results   Component Value Date    TSH 3.73 09/12/2022       Patient voiced understanding and agreement in the above discussion. Aware to contact our office with questions/symptoms in the interim.

## 2024-04-02 NOTE — TELEPHONE ENCOUNTER
Phoned  inf and spoke to Andry Cordoba to ask her to cancel inf this week as pt will not be recieving. Pt will be having scans.

## 2024-04-02 NOTE — PROGRESS NOTES
In-basket message received from Porsha Franklin RN to add patient to the neuro [facility] on 4/3/2024. Chart reviewed and prep completed.

## 2024-04-03 ENCOUNTER — HOSPITAL ENCOUNTER (OUTPATIENT)
Dept: INFUSION CENTER | Facility: HOSPITAL | Age: 69
Discharge: HOME | End: 2024-04-03

## 2024-04-03 ENCOUNTER — DOCUMENTATION (OUTPATIENT)
Facility: HOSPITAL | Age: 69
End: 2024-04-03

## 2024-04-03 ENCOUNTER — RADIATION ONCOLOGY FOLLOW-UP (OUTPATIENT)
Dept: RADIATION ONCOLOGY | Facility: HOSPITAL | Age: 69
End: 2024-04-03
Attending: STUDENT IN AN ORGANIZED HEALTH CARE EDUCATION/TRAINING PROGRAM
Payer: COMMERCIAL

## 2024-04-03 VITALS
OXYGEN SATURATION: 97 % | DIASTOLIC BLOOD PRESSURE: 90 MMHG | HEART RATE: 78 BPM | TEMPERATURE: 98.2 F | RESPIRATION RATE: 20 BRPM | SYSTOLIC BLOOD PRESSURE: 145 MMHG

## 2024-04-03 DIAGNOSIS — C34.91 CARCINOMA OF LUNG, RIGHT (HCC): ICD-10-CM

## 2024-04-03 DIAGNOSIS — C79.31 METASTASIS TO BRAIN (HCC): Primary | ICD-10-CM

## 2024-04-03 PROCEDURE — G0463 HOSPITAL OUTPT CLINIC VISIT: HCPCS | Performed by: STUDENT IN AN ORGANIZED HEALTH CARE EDUCATION/TRAINING PROGRAM

## 2024-04-03 PROCEDURE — 99211 OFF/OP EST MAY X REQ PHY/QHP: CPT | Performed by: STUDENT IN AN ORGANIZED HEALTH CARE EDUCATION/TRAINING PROGRAM

## 2024-04-03 PROCEDURE — 99214 OFFICE O/P EST MOD 30 MIN: CPT | Performed by: STUDENT IN AN ORGANIZED HEALTH CARE EDUCATION/TRAINING PROGRAM

## 2024-04-03 NOTE — PROGRESS NOTES
Paras Pitts 1955 is a 68 y.o. male 68 year old man with Stage JAY ([id number]) lung adenocarcinoma s/p resection of a right frontal metastasis. On 4/28/23 he completed a course of postoperative SRT to a dose of 3000cGy in 5 fractions. He thereafter underwent neoadjuvant chemoimmunotherapy (carbo/taxol/nivo from 5/18/23-7/20/23) followed by right thoracotomy with RUL lobectomy and extensive mediastinal LN dissection. He was not recommended for adjuvant RT. He was last seen on 1/24/24 and presents for 3 month follow up and MRI review.        1/25/24 Hematology Oncology - Dr. 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    Started on tapering dose of Decadron.  Will need to delay the immunotherapy for somewhere between 3 to 4 weeks while he is on high-dose Decadron  CT scan of the chest abdomen pelvis prior to his next visit somewhere in March.  Repeat MRI earlier this month which appears overall stable.  Recent MRI of the brain on 1/18/2023 showed vasogenic edema as stated above.    3/5/24 CT chest abdomen pelvis w  contrast  IMPRESSION:  There is new lower right paratracheal lymphadenopathy in the mediastinum,, measuring 1.9 cm x 2.2 cm. (Level 4R) this may be a neoplastic lymph node or may drug-induced granulomatous reaction, needs further characterization  Nodular soft tissue seen along the left lateral upper thoracic trachea, image 25 series 2, indeterminate may be a focal lesion or adherent secretions. Consider follow-up CT at 3 months or characterization with bronchoscopy  Postsurgical changes from the resection of the right upper lobe mass    3/7/24 Hematology Oncology - Dr. Langford  Continues on current treatment with nivolumab q 4 weeks   Recent CT scan of the chest abdomen pelvis 3/5/2024 and showed a new lower right paratracheal lymphadenopathy measuring 2.2 cm close to the  mediastinum he also seems to have nodular soft tissue along the left lateral upper thoracic trachea of unknown etiology.   PET CT for further eval  Will see thoracic surgical team for follow-up 3/20/24    3/25/24 NM PET CT skull base to mid thigh  IMPRESSION:  1. FDG-avid mediastinal lymphadenopathy as seen on recent CT compatible with metastasis. No residual abnormality at the left upper trachea as previously seen likely secretions.  2.  No findings for hypermetabolic metastatic disease to the neck, abdomen and pelvis.    3/26/24 MRI Brain BT w wo contrast  IMPRESSION:  -Status post treatment resection and stereotactic radiosurgery of right frontal mass. Continued increase in enhancement and vasogenic edema around the resection cavity. Question mild restricted diffusion and increased cerebral blood volume along the   superior margin however no definite increased ASL signal. Differential includes posttreatment changes versus recurrent disease. Continued attention on follow-up.  -New 1.5 mm leftward subfalcine herniation.    24 Hematology Oncology - Dr. Langford: Seems to have mediastinal hypermetabolic adenopathy compatible with metastatic disease which needs to be further investigated with EBUS guided biopsy by the thoracic surgical team.  Subsequently, we will make a decision regarding treatment option once the recurrence of his non-small cell lung cancer is confirmed.      Upcomin/10/24 Thoracic Surgery - Dr. Sparrow      Follow up visit     Oncology History Overview Note   68 year old man with Stage JAY ([id number]) lung adenocarcinoma s/p resection of a right frontal metastasis. On 23 he completed a course of postoperative SRT to a dose of 3000cGy in 5 fractions. He thereafter underwent neoadjuvant chemoimmunotherapy (carbo/taxol/nivo from 23-23) followed by right thoracotomy with RUL lobectomy and extensive mediastinal LN dissection. He was not recommended for adjuvant RT. He was last  seen on 1/24/24 and presents for 3 month follow up and MRI review.        1/25/24 Hematology Oncology - Dr. 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    Started on tapering dose of Decadron.  Will need to delay the immunotherapy for somewhere between 3 to 4 weeks while he is on high-dose Decadron  CT scan of the chest abdomen pelvis prior to his next visit somewhere in March.  Repeat MRI earlier this month which appears overall stable.  Recent MRI of the brain on 1/18/2023 showed vasogenic edema as stated above.    3/5/24 CT chest abdomen pelvis w  contrast  IMPRESSION:  There is new lower right paratracheal lymphadenopathy in the mediastinum,, measuring 1.9 cm x 2.2 cm. (Level 4R) this may be a neoplastic lymph node or may drug-induced granulomatous reaction, needs further characterization  Nodular soft tissue seen along the left lateral upper thoracic trachea, image 25 series 2, indeterminate may be a focal lesion or adherent secretions. Consider follow-up CT at 3 months or characterization with bronchoscopy  Postsurgical changes from the resection of the right upper lobe mass    3/7/24 Hematology Oncology - Dr. Langford  Continues on current treatment with nivolumab q 4 weeks   Recent CT scan of the chest abdomen pelvis 3/5/2024 and showed a new lower right paratracheal lymphadenopathy measuring 2.2 cm close to the mediastinum he also seems to have nodular soft tissue along the left lateral upper thoracic trachea of unknown etiology.   PET CT for further eval  Will see thoracic surgical team for follow-up 3/20/24    3/25/24 NM PET CT skull base to mid thigh  IMPRESSION:  1. FDG-avid mediastinal lymphadenopathy as seen on recent CT compatible with metastasis. No residual abnormality at the left upper trachea as previously seen likely secretions.  2.  No findings for hypermetabolic metastatic  disease to the neck, abdomen and pelvis.    3/26/24 MRI Brain BT w wo contrast  IMPRESSION:  -Status post treatment resection and stereotactic radiosurgery of right frontal mass. Continued increase in enhancement and vasogenic edema around the resection cavity. Question mild restricted diffusion and increased cerebral blood volume along the   superior margin however no definite increased ASL signal. Differential includes posttreatment changes versus recurrent disease. Continued attention on follow-up.  -New 1.5 mm leftward subfalcine herniation.    24 Hematology Oncology - Dr. Langford      Upcomin/10/24 Thoracic Surgery - Dr. Sparrow       Metastatic adenocarcinoma (HCC)    Initial Diagnosis    Metastatic adenocarcinoma (HCC)     3/23/2023 Biopsy    Brain, right frontal mass (biopsy):  - Metastatic non-small cell carcinoma     Comment:  - Tumor cells stain diffusely for CAM5.2, CKAE1/3, CK7, TTF1 and NapsinA with absent CK20, p63, CK5/6, p40 expression.  This immunopanel favors a metastatic lung adenocarcinoma.       2023 - 2023 Chemotherapy    cyanocobalamin, 1,000 mcg, Intramuscular, Once, 3 of 3 cycles  Administration: 1,000 mcg (2023), 1,000 mcg (2023), 1,000 mcg (2023)  alteplase (CATHFLO), 2 mg, Intracatheter, Every 1 Minute as needed, 4 of 4 cycles  pegfilgrastim (NEULASTA ONPRO), 6 mg, Subcutaneous, Once, 3 of 3 cycles  Administration: 6 mg (2023), 6 mg (2023), 6 mg (2023)  fosaprepitant (EMEND) IVPB, 150 mg, Intravenous, Once, 4 of 4 cycles  Administration: 150 mg (2023), 150 mg (2023), 150 mg (2023), 150 mg (2023)  nivolumab (OPDIVO) IVPB, 360 mg (150 % of original dose 240 mg), Intravenous, Once, 4 of 4 cycles  Dose modification: 360 mg (original dose 240 mg, Cycle 1, Reason: Dose modified as per discussion with consulting physician)  Administration: 360 mg (2023), 360 mg (2023), 360 mg (2023), 360 mg  (7/20/2023)  CARBOplatin (PARAPLATIN) IVPB (GOG AUC DOSING), 642.5 mg, Intravenous, Once, 4 of 4 cycles  Administration: 642.5 mg (5/18/2023), 642.5 mg (6/29/2023), 566.5 mg (7/20/2023), 650 mg (6/8/2023)  pemetrexed (ALIMTA) chemo infusion, 980 mg, Intravenous, Once, 4 of 4 cycles  Administration: 1,000 mg (5/18/2023), 1,000 mg (6/29/2023), 1,000 mg (7/20/2023), 1,000 mg (6/8/2023)     10/11/2023 - 10/11/2023 Chemotherapy    alteplase (CATHFLO), 2 mg, Intracatheter, Every 1 Minute as needed, 0 of 6 cycles  nivolumab (OPDIVO) IVPB, 240 mg, Intravenous, Once, 0 of 6 cycles     10/13/2023 -  Chemotherapy    alteplase (CATHFLO), 2 mg, Intracatheter, Every 1 Minute as needed, 5 of 8 cycles  nivolumab (OPDIVO) IVPB, 480 mg, Intravenous, Once, 5 of 8 cycles  Administration: 480 mg (10/13/2023), 480 mg (11/10/2023), 480 mg (12/8/2023), 480 mg (1/5/2024), 480 mg (2/23/2024)     Carcinoma of right lung (HCC)   4/13/2023 Initial Diagnosis    Carcinoma of right lung (HCC)     4/13/2023 -  Cancer Staged    Staging form: Lung, AJCC 8th Edition  - Clinical: Stage JAY (cT3, cN1, cM1b) - Signed by James Contreras MD on 4/13/2023 4/19/2023 - 4/28/2023 Radiation    Plan ID Energy Fractions Dose per Fraction (cGy) Dose Correction (cGy) Total Dose Delivered (cGy) Elapsed Days   SRT R Frontal 6X-FFF 5 / 5 600 0 3,000 9         5/18/2023 - 7/20/2023 Chemotherapy    cyanocobalamin, 1,000 mcg, Intramuscular, Once, 3 of 3 cycles  Administration: 1,000 mcg (6/29/2023), 1,000 mcg (5/18/2023), 1,000 mcg (7/20/2023)  alteplase (CATHFLO), 2 mg, Intracatheter, Every 1 Minute as needed, 4 of 4 cycles  pegfilgrastim (NEULASTA ONPRO), 6 mg, Subcutaneous, Once, 3 of 3 cycles  Administration: 6 mg (6/8/2023), 6 mg (6/29/2023), 6 mg (7/20/2023)  fosaprepitant (EMEND) IVPB, 150 mg, Intravenous, Once, 4 of 4 cycles  Administration: 150 mg (5/18/2023), 150 mg (6/29/2023), 150 mg (7/20/2023), 150 mg (6/8/2023)  nivolumab (OPDIVO) IVPB, 360 mg  (150 % of original dose 240 mg), Intravenous, Once, 4 of 4 cycles  Dose modification: 360 mg (original dose 240 mg, Cycle 1, Reason: Dose modified as per discussion with consulting physician)  Administration: 360 mg (5/18/2023), 360 mg (6/8/2023), 360 mg (6/29/2023), 360 mg (7/20/2023)  CARBOplatin (PARAPLATIN) IVPB (GOG AUC DOSING), 642.5 mg, Intravenous, Once, 4 of 4 cycles  Administration: 642.5 mg (5/18/2023), 642.5 mg (6/29/2023), 566.5 mg (7/20/2023), 650 mg (6/8/2023)  pemetrexed (ALIMTA) chemo infusion, 980 mg, Intravenous, Once, 4 of 4 cycles  Administration: 1,000 mg (5/18/2023), 1,000 mg (6/29/2023), 1,000 mg (7/20/2023), 1,000 mg (6/8/2023)     9/1/2023 -  Cancer Staged    Staging form: Lung, AJCC 8th Edition  - Pathologic stage from 9/1/2023: Stage JAY (pT2b, pN2, cM1b) - Signed by Treva Bah PA-C on 9/20/2023  Histopathologic type: Adenocarcinoma, NOS  Stage prefix: Initial diagnosis  Histologic grade (G): G3  Histologic grading system: 4 grade system       9/1/2023 Surgery    Right robotic converted to open upper lobectomy      9/1/2023 Biopsy    A.  Lung, right upper lobe:     - Invasive poorly differentiated solid adenocarcinoma of the lung, 4.4 cm.     - Tumor invades into, but not through, the visceral pleura (PL1), confirmed by special VVG stains.     - Lymphatic channel invasion by tumor identified.     - Metastatic carcinoma present in three of thirteen lymph nodes (3/13).       -- Rare fibrotic granulomas present.     - All margins are negative for tumor.     - Emphysematous changes.     B.  Lymph node, level 8:     - Negative for malignancy (0/1).     C.  Lymph node, level 7, #1:     - Negative for malignancy (0/1).     D.  Lymph node, level 4R:     - Negative for malignancy (0/1).     E.  Lymph node, level 4R, #2:     - Fibrovascular adipose tissue; negative for malignancy.     - No lymphoid tissue identified.     F.  Lymph node, level 2R, #1:     - Metastatic carcinoma present in one of  three lymph nodes (1/3).     G.  Lymph node, level 2R, #2:     - Negative for malignancy (0/1).     H.  Lymph node, level 11 posterior:     - Single lymph node positive for metastatic carcinoma (1/1).     I.  Lymph node, portion of level 12 on artery:     - Negative for malignancy (0/1).     - Non-caseating granulomatous inflammation present.        -- Special stains for acid fast bacilli and fungal organisms are negative.        10/11/2023 - 10/11/2023 Chemotherapy    alteplase (CATHFLO), 2 mg, Intracatheter, Every 1 Minute as needed, 0 of 6 cycles  nivolumab (OPDIVO) IVPB, 240 mg, Intravenous, Once, 0 of 6 cycles     10/13/2023 -  Chemotherapy    alteplase (CATHFLO), 2 mg, Intracatheter, Every 1 Minute as needed, 5 of 8 cycles  nivolumab (OPDIVO) IVPB, 480 mg, Intravenous, Once, 5 of 8 cycles  Administration: 480 mg (10/13/2023), 480 mg (11/10/2023), 480 mg (12/8/2023), 480 mg (1/5/2024), 480 mg (2/23/2024)         Review of Systems:  Review of Systems   Constitutional: Negative.    HENT:  Positive for congestion.    Eyes: Negative.    Respiratory:  Positive for cough and shortness of breath (resolved).    Gastrointestinal: Negative.    Musculoskeletal:  Positive for gait problem.        Left sided numbness improving since steroid use. Mild now. Using cane. Partial use of hand. Strength improving.   Psychiatric/Behavioral: Negative.         Clinical Trial: no    IPSS Questionnaire (AUA-7):  Teaching    Health Maintenance   Topic Date Due    COVID-19 Vaccine (3 - Pfizer risk series) 05/14/2021    Fall Risk  04/18/2024    BMI: Followup Plan  10/20/2024    Annual Physical  10/20/2024    Depression Screening  01/24/2025    BMI: Adult  04/02/2025    DTaP,Tdap,and Td Vaccines (2 - Td or Tdap) 04/26/2028    Colorectal Cancer Screening  08/10/2031    Hepatitis C Screening  Completed    Zoster Vaccine  Completed    Pneumococcal Vaccine: 65+ Years  Completed    Influenza Vaccine  Completed    HIB Vaccine  Aged Out    IPV  Vaccine  Aged Out    Hepatitis A Vaccine  Aged Out    Meningococcal ACWY Vaccine  Aged Out    HPV Vaccine  Aged Out     Patient Active Problem List   Diagnosis    Negative depression screening    Overweight (BMI 25.0-29.9)    Essential hypertension    Annual physical exam    Hypercholesterolemia    Lung nodule    Metastatic adenocarcinoma (HCC)    Carcinoma of right lung (HCC)    Encounter for central line care    Chemotherapy-induced neutropenia (HCC)    History of CVA (cerebrovascular accident)     Past Medical History:   Diagnosis Date    Brain compression (HCC) 03/20/2023    Brain mass 03/20/2023    Cancer (HCC) 2001    Receint lung and brain lesions and prostate cancer in 2001    Cerebral edema (HCC) 03/20/2023    CVA (cerebral vascular accident) (HCC) 08/12/2021    Hypertension     Prostate cancer (HCC) 2001    Rectal bleeding     Stroke (HCC)     2011       Past Surgical History:   Procedure Laterality Date    COLONOSCOPY      CRANIOTOMY Right 3/23/2023    Procedure: Right frontal CRANIOTOMY IMAGE-GUIDED FOR TUMOR;  Surgeon: Clark Carrillo MD;  Location: [facility];  Service: Neurosurgery    IR PORT PLACEMENT  4/27/2023    TX BRNCHSC INCL FLUOR GDNCE DX W/CELL WASHG SPX N/A 9/1/2023    Procedure: BRONCHOSCOPY FLEXIBLE;  Surgeon: Kalia Sparrow MD;  Location: [facility];  Service: Thoracic    TX CYSTOURETHROSCOPY N/A 3/23/2023    Procedure: EUA, DEL CASTILLO INSERTION;  Surgeon: Ajay Piedra MD;  Location: [facility];  Service: Urology    TX THORACOSCOPY W/LOBECTOMY SINGLE LOBE Right 9/1/2023    Procedure: robotic assisted converted to open right upper lobectomy, lymph node dissection;  Surgeon: Kalia Sparrow MD;  Location: [facility];  Service: Thoracic    PROSTATECTOMY  2001    THORACOTOMY Right 9/1/2023    Procedure: right thoracotomy with Cryo-Ablation;  Surgeon: Kalia Sparrow MD;  Location: [facility];  Service: Thoracic    TONSILLECTOMY       Family History   Problem Relation Age of Onset     Dementia Mother             Breast cancer Sister             Prostate cancer Brother         Received Radiation     Social History     Socioeconomic History    Marital status: /Civil Union     Spouse name: Not on file    Number of children: Not on file    Years of education: Not on file    Highest education level: Not on file   Occupational History    Not on file   Tobacco Use    Smoking status: Former     Current packs/day: 0.00     Average packs/day: 1 pack/day for 15.0 years (15.0 ttl pk-yrs)     Types: Cigarettes     Start date:      Quit date:      Years since quittin.2     Passive exposure: Never    Smokeless tobacco: Never    Tobacco comments:     i quit when i was 28. not sure of exact dates   Vaping Use    Vaping status: Never Used   Substance and Sexual Activity    Alcohol use: Yes     Alcohol/week: 6.0 standard drinks of alcohol     Types: 6 Cans of beer per week     Comment: socially  last drink 3/23    Drug use: Yes     Types: Marijuana     Comment: gummies foro nausea with chemo    Sexual activity: Yes     Partners: Female     Birth control/protection: None   Other Topics Concern    Not on file   Social History Narrative    Not on file     Social Determinants of Health     Financial Resource Strain: Not on file   Food Insecurity: No Food Insecurity (2023)    Hunger Vital Sign     Worried About Running Out of Food in the Last Year: Never true     Ran Out of Food in the Last Year: Never true   Transportation Needs: No Transportation Needs (2023)    PRAPARE - Transportation     Lack of Transportation (Medical): No     Lack of Transportation (Non-Medical): No   Physical Activity: Not on file   Stress: Not on file   Social Connections: Not on file   Intimate Partner Violence: Not on file   Housing Stability: Low Risk  (2023)    Housing Stability Vital Sign     Unable to Pay for Housing in the Last Year: No     Number of Places Lived in the Last Year: 1      Unstable Housing in the Last Year: No       Current Outpatient Medications:     amLODIPine (NORVASC) 10 mg tablet, Take 1 tablet (10 mg total) by mouth daily, Disp: 90 tablet, Rfl: 0    atorvastatin (LIPITOR) 40 mg tablet, Take 1 tablet (40 mg total) by mouth daily after dinner, Disp: 30 tablet, Rfl: 0    dexamethasone (DECADRON) 2 mg tablet, Take 1 tablet (2 mg total) by mouth 2 (two) times a day with meals, Disp: 60 tablet, Rfl: 0    losartan (COZAAR) 50 mg tablet, Take 1 tablet (50 mg total) by mouth daily, Disp: 90 tablet, Rfl: 0    pantoprazole (PROTONIX) 40 mg tablet, Take 1 tablet (40 mg total) by mouth daily, Disp: 60 tablet, Rfl: 0    docusate sodium (COLACE) 100 mg capsule, Take 1 capsule (100 mg total) by mouth daily Do not start before September 6, 2023. (Patient not taking: Reported on 11/29/2023), Disp: 20 capsule, Rfl: 0    gabapentin (Neurontin) 300 mg capsule, Take 1 capsule (300 mg total) by mouth 3 (three) times a day, Disp: 90 capsule, Rfl: 1    ondansetron (ZOFRAN) 8 mg tablet, Take 1 tablet (8 mg total) by mouth every 8 (eight) hours as needed for nausea or vomiting (Patient not taking: Reported on 10/20/2023), Disp: 20 tablet, Rfl: 3  No Known Allergies  Vitals:    04/03/24 1443   BP: 145/90   Pulse: 78   Resp: 20   Temp: 98.2 °F (36.8 °C)   SpO2: 97%      Pain Score: 0-No pain

## 2024-04-03 NOTE — PROGRESS NOTES
NEURO ONCOLOGY CASE REVIEW     DATE: 4/3/2024  PRESENTING DOCTOR: Dr Steven Contreras    DIAGNOSIS:  Stage JAY ([id number]) lung adenocarcinoma        Paras Pitts is a 68 y.o. male who was presented at Neuro Oncology Case Review today. History of Stage JAY ([id number]) lung adenocarcinoma s/p resection of a right frontal metastasis. On 4/28/23 he completed a course of postoperative SRT to a dose of 3000cGy in 5 fractions. He thereafter underwent neoadjuvant chemoimmunotherapy (carbo/taxol/nivo from 5/18/23-7/20/23) followed by right thoracotomy with RUL lobectomy and extensive mediastinal LN dissection. He started adjuvant immunotherapy with nivolumab on 10/13/2023.     PHYSICIAN RECOMMENDED PLAN:   - Offer Avastin for radiation necrosis    Upcoming Oncology Appointments:   4/3/2024 Radiation oncology, Dr Contreras  5/2/2024 Hematology oncology, Dr Langford    Imaging Reviewed:   3/26/2024 MRI brain BT:   IMPRESSION:   -Status post treatment resection and stereotactic radiosurgery of right frontal mass. Continued increase in enhancement and vasogenic edema around the resection cavity. Question mild restricted diffusion and increased cerebral blood volume along the   superior margin however no definite increased ASL signal. Differential includes posttreatment changes versus recurrent disease. Continued attention on follow-up.     -New 1.5 mm leftward subfalcine herniation.      1/18/2024 MRI brain BT  10/5/2023 MRI brain BT  3/21/2023 MRI brain BT      Team agreed to plan.    NCCN guidelines were readily available for review at this discussion.    The final treatment plan will be left at the discretion of the patient and the treating physician.     DISCLAIMERS:  TO THE TREATING PHYSICIAN:  This conference is a meeting of clinicians from various specialty areas who evaluate and discuss patients for whom a multidisciplinary treatment approach is being considered. Please note that the above  opinion was a consensus of the conference attendees and is intended only to assist in quality care of the discussed patient.  The responsibility for follow up on the input given during the conference, along with any final decisions regarding plan of care, is that of the patient and the patient's provider. Accordingly, appointments have only been recommended based on this information; and have NOT been scheduled unless otherwise noted.      TO THE PATIENT:  This summary is a brief record of major aspects of your cancer treatment. You may choose to can share a your copy with any of your doctors or nurses. However, this is not a detailed or comprehensive record of your care.

## 2024-04-04 ENCOUNTER — HOSPITAL ENCOUNTER (OUTPATIENT)
Dept: INFUSION CENTER | Facility: HOSPITAL | Age: 69
End: 2024-04-04
Attending: INTERNAL MEDICINE

## 2024-04-04 NOTE — PROGRESS NOTES
Follow-up - Radiation Oncology   Paras Pitts 1955 68 y.o. male [id number]      History of Present Illness   Cancer Staging   Carcinoma of right lung (HCC)  Staging form: Lung, AJCC 8th Edition  - Clinical: Stage JAY (cT3, cN1, cM1b) - Signed by James Contreras MD on 4/13/2023  - Pathologic stage from 9/1/2023: Stage JAY (pT2b, pN2, cM1b) - Signed by Treva Bah PA-C on 9/20/2023  Histopathologic type: Adenocarcinoma, NOS  Stage prefix: Initial diagnosis  Histologic grade (G): G3  Histologic grading system: 4 grade system      Mr. Paras Pitts is a 68 year old man with Stage JAY ([id number]) lung adenocarcinoma s/p resection of a right frontal metastasis. On 4/28/23 he completed a course of postoperative SRT to a dose of 3000cGy in 5 fractions. He was last seen in clinic on 7/5/23. He thereafter underwent neoadjuvant chemoimmunotherapy (carbo/taxol/nivo from 5/18/23-7/20/23) followed by right thoracotomy with RUL lobectomy and extensive mediastinal LN dissection. He was not recommended for adjuvant RT. He returns today for follow-up.      Interval History:  The patient was last seen in clinic on 1/24/24. Since then he has continued to follow with Dr. Langford receiving maintenance nivolumab.     Since his last course of RT he has had recurrent left sided weakness, which initially improved with steroids but has since worsened again. He was placed back on steroids with some improvement, currently on dexamethasone 2mg BID.     PET/CT (3/25/24) demonstrated:  1. FDG-avid mediastinal lymphadenopathy as seen on recent CT compatible with metastasis. No residual abnormality at the left upper trachea as previously seen likely secretions.  2.  No findings for hypermetabolic metastatic disease to the neck, abdomen and pelvis.    MRI Brain (3/26/24) demonstrated:  -Status post treatment resection and stereotactic radiosurgery of right frontal mass. Continued increase in enhancement and vasogenic edema around the  resection cavity. Question mild restricted diffusion and increased cerebral blood volume along the superior margin however no definite increased ASL signal. Differential includes posttreatment changes versus recurrent disease. Continued attention on follow-up.  -New 1.5 mm leftward subfalcine herniation.    Historical Information   Oncology History   Metastatic adenocarcinoma (HCC)   2023 Initial Diagnosis    Metastatic adenocarcinoma (HCC)     3/23/2023 Biopsy    Brain, right frontal mass (biopsy):  - Metastatic non-small cell carcinoma     Comment:  - Tumor cells stain diffusely for CAM5.2, CKAE1/3, CK7, TTF1 and NapsinA with absent CK20, p63, CK5/6, p40 expression.  This immunopanel favors a metastatic lung adenocarcinoma.       5/18/2023 - 7/20/2023 Chemotherapy    cyanocobalamin, 1,000 mcg, Intramuscular, Once, 3 of 3 cycles  Administration: 1,000 mcg (6/29/2023), 1,000 mcg (5/18/2023), 1,000 mcg (7/20/2023)  alteplase (CATHFLO), 2 mg, Intracatheter, Every 1 Minute as needed, 4 of 4 cycles  pegfilgrastim (NEULASTA ONPRO), 6 mg, Subcutaneous, Once, 3 of 3 cycles  Administration: 6 mg (6/8/2023), 6 mg (6/29/2023), 6 mg (7/20/2023)  fosaprepitant (EMEND) IVPB, 150 mg, Intravenous, Once, 4 of 4 cycles  Administration: 150 mg (5/18/2023), 150 mg (6/29/2023), 150 mg (7/20/2023), 150 mg (6/8/2023)  nivolumab (OPDIVO) IVPB, 360 mg (150 % of original dose 240 mg), Intravenous, Once, 4 of 4 cycles  Dose modification: 360 mg (original dose 240 mg, Cycle 1, Reason: Dose modified as per discussion with consulting physician)  Administration: 360 mg (5/18/2023), 360 mg (6/8/2023), 360 mg (6/29/2023), 360 mg (7/20/2023)  CARBOplatin (PARAPLATIN) IVPB (GOG AUC DOSING), 642.5 mg, Intravenous, Once, 4 of 4 cycles  Administration: 642.5 mg (5/18/2023), 642.5 mg (6/29/2023), 566.5 mg (7/20/2023), 650 mg (6/8/2023)  pemetrexed (ALIMTA) chemo infusion, 980 mg, Intravenous, Once, 4 of 4 cycles  Administration: 1,000 mg (5/18/2023),  1,000 mg (6/29/2023), 1,000 mg (7/20/2023), 1,000 mg (6/8/2023)     10/11/2023 - 10/11/2023 Chemotherapy    alteplase (CATHFLO), 2 mg, Intracatheter, Every 1 Minute as needed, 0 of 6 cycles  nivolumab (OPDIVO) IVPB, 240 mg, Intravenous, Once, 0 of 6 cycles     10/13/2023 -  Chemotherapy    alteplase (CATHFLO), 2 mg, Intracatheter, Every 1 Minute as needed, 5 of 8 cycles  nivolumab (OPDIVO) IVPB, 480 mg, Intravenous, Once, 5 of 8 cycles  Administration: 480 mg (10/13/2023), 480 mg (11/10/2023), 480 mg (12/8/2023), 480 mg (1/5/2024), 480 mg (2/23/2024)     Carcinoma of right lung (HCC)   4/13/2023 Initial Diagnosis    Carcinoma of right lung (HCC)     4/13/2023 -  Cancer Staged    Staging form: Lung, AJCC 8th Edition  - Clinical: Stage JAY (cT3, cN1, cM1b) - Signed by James Contreras MD on 4/13/2023 4/19/2023 - 4/28/2023 Radiation    Plan ID Energy Fractions Dose per Fraction (cGy) Dose Correction (cGy) Total Dose Delivered (cGy) Elapsed Days   SRT R Frontal 6X-FFF 5 / 5 600 0 3,000 9         5/18/2023 - 7/20/2023 Chemotherapy    cyanocobalamin, 1,000 mcg, Intramuscular, Once, 3 of 3 cycles  Administration: 1,000 mcg (6/29/2023), 1,000 mcg (5/18/2023), 1,000 mcg (7/20/2023)  alteplase (CATHFLO), 2 mg, Intracatheter, Every 1 Minute as needed, 4 of 4 cycles  pegfilgrastim (NEULASTA ONPRO), 6 mg, Subcutaneous, Once, 3 of 3 cycles  Administration: 6 mg (6/8/2023), 6 mg (6/29/2023), 6 mg (7/20/2023)  fosaprepitant (EMEND) IVPB, 150 mg, Intravenous, Once, 4 of 4 cycles  Administration: 150 mg (5/18/2023), 150 mg (6/29/2023), 150 mg (7/20/2023), 150 mg (6/8/2023)  nivolumab (OPDIVO) IVPB, 360 mg (150 % of original dose 240 mg), Intravenous, Once, 4 of 4 cycles  Dose modification: 360 mg (original dose 240 mg, Cycle 1, Reason: Dose modified as per discussion with consulting physician)  Administration: 360 mg (5/18/2023), 360 mg (6/8/2023), 360 mg (6/29/2023), 360 mg (7/20/2023)  CARBOplatin (PARAPLATIN) IVPB (GOG  AUC DOSING), 642.5 mg, Intravenous, Once, 4 of 4 cycles  Administration: 642.5 mg (5/18/2023), 642.5 mg (6/29/2023), 566.5 mg (7/20/2023), 650 mg (6/8/2023)  pemetrexed (ALIMTA) chemo infusion, 980 mg, Intravenous, Once, 4 of 4 cycles  Administration: 1,000 mg (5/18/2023), 1,000 mg (6/29/2023), 1,000 mg (7/20/2023), 1,000 mg (6/8/2023)     9/1/2023 -  Cancer Staged    Staging form: Lung, AJCC 8th Edition  - Pathologic stage from 9/1/2023: Stage JAY (pT2b, pN2, cM1b) - Signed by Treva Bah PA-C on 9/20/2023  Histopathologic type: Adenocarcinoma, NOS  Stage prefix: Initial diagnosis  Histologic grade (G): G3  Histologic grading system: 4 grade system       9/1/2023 Surgery    Right robotic converted to open upper lobectomy      9/1/2023 Biopsy    A.  Lung, right upper lobe:     - Invasive poorly differentiated solid adenocarcinoma of the lung, 4.4 cm.     - Tumor invades into, but not through, the visceral pleura (PL1), confirmed by special VVG stains.     - Lymphatic channel invasion by tumor identified.     - Metastatic carcinoma present in three of thirteen lymph nodes (3/13).       -- Rare fibrotic granulomas present.     - All margins are negative for tumor.     - Emphysematous changes.     B.  Lymph node, level 8:     - Negative for malignancy (0/1).     C.  Lymph node, level 7, #1:     - Negative for malignancy (0/1).     D.  Lymph node, level 4R:     - Negative for malignancy (0/1).     E.  Lymph node, level 4R, #2:     - Fibrovascular adipose tissue; negative for malignancy.     - No lymphoid tissue identified.     F.  Lymph node, level 2R, #1:     - Metastatic carcinoma present in one of three lymph nodes (1/3).     G.  Lymph node, level 2R, #2:     - Negative for malignancy (0/1).     H.  Lymph node, level 11 posterior:     - Single lymph node positive for metastatic carcinoma (1/1).     I.  Lymph node, portion of level 12 on artery:     - Negative for malignancy (0/1).     - Non-caseating granulomatous  inflammation present.        -- Special stains for acid fast bacilli and fungal organisms are negative.        10/11/2023 - 10/11/2023 Chemotherapy    alteplase (CATHFLO), 2 mg, Intracatheter, Every 1 Minute as needed, 0 of 6 cycles  nivolumab (OPDIVO) IVPB, 240 mg, Intravenous, Once, 0 of 6 cycles     10/13/2023 -  Chemotherapy    alteplase (CATHFLO), 2 mg, Intracatheter, Every 1 Minute as needed, 5 of 8 cycles  nivolumab (OPDIVO) IVPB, 480 mg, Intravenous, Once, 5 of 8 cycles  Administration: 480 mg (10/13/2023), 480 mg (11/10/2023), 480 mg (12/8/2023), 480 mg (1/5/2024), 480 mg (2/23/2024)       Review of Systems   Constitutional: Negative.    HENT: Negative.     Eyes:         Watery eyes with crusting - since starting immunotherapy   Respiratory:  Positive for cough and shortness of breath.    Gastrointestinal: Negative.    Endocrine: Negative.    Genitourinary: Negative.    Musculoskeletal:  Positive for gait problem (since leg weakness this week).        Surgical pain slowly improving. Incision well healed, per pt.   Skin: Negative.    Neurological:  Positive for weakness (left sided weakness started about a week ago. arms and legs.) and light-headedness (at baseline).   Psychiatric/Behavioral: Negative.          OBJECTIVE:   /90   Pulse 78   Temp 98.2 °F (36.8 °C)   Resp 20   SpO2 97%   Pain Assessment:  0  ECOG/Zubrod/WHO: 1 - Symptomatic but completely ambulatory    Physical Exam   Well appearing. NAD.   No increased work of breathing.   Extremities warm and well perfused.   Strength 5/5 in RUE and RLE. Strength 4/5 in LUE and LLE. Sensation intact to light touch. Ambulating with some difficulty.     Assessment/Plan:  No orders of the defined types were placed in this encounter.     We reviewed the patient's recent clinical history as well as his recent MRI Brain and PET/CT. These were additionally reviewed at neuro-oncology tumor board in detail. Given the appearance of the right frontal lesion,  "the lack of associated perfusion/ASL signal, and the photopenia seen on PET this appears to be most consistent with treatment effect/necrosis rather than progression of disease. As this is symptomatic and persistent despite steroid treatment it was discussed that a course of Avastin might be appropriate.     With regard to his PET/CT findings in the chest, this appears to be quite concerning for recurrence. He is scheduled for thoracic surgery evaluation and EBUS to confirm, but pending the results of this will likely require chemoRT (possibly concurrent with Avastin) as this is the only site of residual/active disease. Side effects of chemoRT to this site were reviewed in detail with the patient to include fatigue, erythema, esophagitis, and cough/pneumonitis. We will await results of his EBUS prior to proceeding with any treatment.       James Contreras MD  4/4/2024,11:18 AM    Portions of the record may have been created with voice recognition software.  Occasional wrong word or \"sound a like\" substitutions may have occurred due to the inherent limitations of voice recognition software.  Read the chart carefully and recognize, using context, where substitutions have occurred.        "

## 2024-04-05 ENCOUNTER — TELEPHONE (OUTPATIENT)
Dept: HEMATOLOGY ONCOLOGY | Facility: CLINIC | Age: 69
End: 2024-04-05

## 2024-04-05 NOTE — TELEPHONE ENCOUNTER
Phoned pt and spoke to his wife as she is  ore up on pt's appts. Pt has an appt with Dr Sparrow on 4/1024. 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 Jaclyn will phone me with the appt for bx as I instructed her to ask Dr Sparrow about this. Jaclyn does have my Teams number and will phone me with the bx date so I can arrange for chemo teaching.

## 2024-04-10 ENCOUNTER — OFFICE VISIT (OUTPATIENT)
Dept: CARDIAC SURGERY | Facility: CLINIC | Age: 69
End: 2024-04-10
Payer: COMMERCIAL

## 2024-04-10 VITALS
BODY MASS INDEX: 28.7 KG/M2 | DIASTOLIC BLOOD PRESSURE: 81 MMHG | HEIGHT: 69 IN | TEMPERATURE: 98.1 F | WEIGHT: 193.78 LBS | RESPIRATION RATE: 14 BRPM | SYSTOLIC BLOOD PRESSURE: 142 MMHG | HEART RATE: 71 BPM | OXYGEN SATURATION: 97 %

## 2024-04-10 DIAGNOSIS — C34.91 CARCINOMA OF RIGHT LUNG (HCC): Primary | ICD-10-CM

## 2024-04-10 DIAGNOSIS — C79.31 METASTASIS TO BRAIN (HCC): Primary | ICD-10-CM

## 2024-04-10 PROCEDURE — 99214 OFFICE O/P EST MOD 30 MIN: CPT | Performed by: THORACIC SURGERY (CARDIOTHORACIC VASCULAR SURGERY)

## 2024-04-10 RX ORDER — HEPARIN SODIUM 5000 [USP'U]/ML
5000 INJECTION, SOLUTION INTRAVENOUS; SUBCUTANEOUS
OUTPATIENT
Start: 2024-04-11 | End: 2024-04-12

## 2024-04-10 RX ORDER — PANTOPRAZOLE SODIUM 40 MG/1
40 TABLET, DELAYED RELEASE ORAL DAILY
Qty: 60 TABLET | Refills: 0 | Status: SHIPPED | OUTPATIENT
Start: 2024-04-10

## 2024-04-10 RX ORDER — HYDROCHLOROTHIAZIDE 25 MG/1
25 TABLET ORAL DAILY
COMMUNITY
Start: 2024-03-14

## 2024-04-10 NOTE — LETTER
April 10, 2024     Collin Langford MD  [address] [address]    Patient: Paras Pitts   YOB: 1955   Date of Visit: 4/10/2024       Dear Dr. Langford:    Please see below regarding mutual pt.  Below are my notes for this consultation.    If you have questions, please do not hesitate to call me. I look forward to following your patient along with you.         Sincerely,        Kalia Sparrow MD        CC: No Recipients    Amylyn Jena Mortimer, PA-C  4/10/2024  4:52 PM  Incomplete  Thoracic Follow-Up  Assessment/Plan:    Carcinoma of right lung (HCC)      Recommend proceeding with bronchoscopy, EBUS with tissue biopsy 4/16. We discussed risks of surgery in detail including risks involved with anesthesia, bleeding, infection. Patient understood and wishes to proceed with surgery. All questions answered. Consent signed.        Diagnoses and all orders for this visit:    Carcinoma of right lung (HCC)  -     Case request operating room: ENDOBRONCHIAL ULTRASOUND (EBUS), BRONCHOSCOPY FLEXIBLE; Standing  -     Case request operating room: ENDOBRONCHIAL ULTRASOUND (EBUS), BRONCHOSCOPY FLEXIBLE    Other orders  -     hydroCHLOROthiazide 25 mg tablet; Take 25 mg by mouth daily  -     Diet NPO; Sips with meds; Standing  -     Nursing communication Please give pre-op Carbohydrate drink to patient 2-4 hours prior to surgery; Standing  -     Void on call to OR; Standing  -     Insert peripheral IV; Standing  -     Place sequential compression device; Standing  -     heparin (porcine) subcutaneous injection 5,000 Units          Thoracic History       Cancer Staging   Carcinoma of right lung (HCC)  Staging form: Lung, AJCC 8th Edition  - Clinical: Stage JAY (cT3, cN1, cM1b) - Signed by James Contreras MD on 4/13/2023  - Pathologic stage from 9/1/2023: Stage JAY (pT2b, pN2, cM1b) - Signed by Treva Bah PA-C on 9/20/2023  Histopathologic type: Adenocarcinoma, NOS  Stage prefix:  Initial diagnosis  Histologic grade (G): G3  Histologic grading system: 4 grade system  Oncology History   Metastatic adenocarcinoma (HCC)   2023 Initial Diagnosis    Metastatic adenocarcinoma (HCC)     3/23/2023 Biopsy    Brain, right frontal mass (biopsy):  - Metastatic non-small cell carcinoma     Comment:  - Tumor cells stain diffusely for CAM5.2, CKAE1/3, CK7, TTF1 and NapsinA with absent CK20, p63, CK5/6, p40 expression.  This immunopanel favors a metastatic lung adenocarcinoma.       5/18/2023 - 7/20/2023 Chemotherapy    cyanocobalamin, 1,000 mcg, Intramuscular, Once, 3 of 3 cycles  Administration: 1,000 mcg (6/29/2023), 1,000 mcg (5/18/2023), 1,000 mcg (7/20/2023)  alteplase (CATHFLO), 2 mg, Intracatheter, Every 1 Minute as needed, 4 of 4 cycles  pegfilgrastim (NEULASTA ONPRO), 6 mg, Subcutaneous, Once, 3 of 3 cycles  Administration: 6 mg (6/8/2023), 6 mg (6/29/2023), 6 mg (7/20/2023)  fosaprepitant (EMEND) IVPB, 150 mg, Intravenous, Once, 4 of 4 cycles  Administration: 150 mg (5/18/2023), 150 mg (6/29/2023), 150 mg (7/20/2023), 150 mg (6/8/2023)  nivolumab (OPDIVO) IVPB, 360 mg (150 % of original dose 240 mg), Intravenous, Once, 4 of 4 cycles  Dose modification: 360 mg (original dose 240 mg, Cycle 1, Reason: Dose modified as per discussion with consulting physician)  Administration: 360 mg (5/18/2023), 360 mg (6/8/2023), 360 mg (6/29/2023), 360 mg (7/20/2023)  CARBOplatin (PARAPLATIN) IVPB (GOG AUC DOSING), 642.5 mg, Intravenous, Once, 4 of 4 cycles  Administration: 642.5 mg (5/18/2023), 642.5 mg (6/29/2023), 566.5 mg (7/20/2023), 650 mg (6/8/2023)  pemetrexed (ALIMTA) chemo infusion, 980 mg, Intravenous, Once, 4 of 4 cycles  Administration: 1,000 mg (5/18/2023), 1,000 mg (6/29/2023), 1,000 mg (7/20/2023), 1,000 mg (6/8/2023)     10/11/2023 - 10/11/2023 Chemotherapy    alteplase (CATHFLO), 2 mg, Intracatheter, Every 1 Minute as needed, 0 of 6 cycles  nivolumab (OPDIVO) IVPB, 240 mg, Intravenous, Once, 0 of  6 cycles     10/13/2023 -  Chemotherapy    alteplase (CATHFLO), 2 mg, Intracatheter, Every 1 Minute as needed, 5 of 8 cycles  nivolumab (OPDIVO) IVPB, 480 mg, Intravenous, Once, 5 of 8 cycles  Administration: 480 mg (10/13/2023), 480 mg (11/10/2023), 480 mg (12/8/2023), 480 mg (1/5/2024), 480 mg (2/23/2024)     Carcinoma of right lung (HCC)   4/13/2023 Initial Diagnosis    Carcinoma of right lung (HCC)     4/13/2023 -  Cancer Staged    Staging form: Lung, AJCC 8th Edition  - Clinical: Stage JAY (cT3, cN1, cM1b) - Signed by James Contreras MD on 4/13/2023 4/19/2023 - 4/28/2023 Radiation    Plan ID Energy Fractions Dose per Fraction (cGy) Dose Correction (cGy) Total Dose Delivered (cGy) Elapsed Days   SRT R Frontal 6X-FFF 5 / 5 600 0 3,000 9         5/18/2023 - 7/20/2023 Chemotherapy    cyanocobalamin, 1,000 mcg, Intramuscular, Once, 3 of 3 cycles  Administration: 1,000 mcg (6/29/2023), 1,000 mcg (5/18/2023), 1,000 mcg (7/20/2023)  alteplase (CATHFLO), 2 mg, Intracatheter, Every 1 Minute as needed, 4 of 4 cycles  pegfilgrastim (NEULASTA ONPRO), 6 mg, Subcutaneous, Once, 3 of 3 cycles  Administration: 6 mg (6/8/2023), 6 mg (6/29/2023), 6 mg (7/20/2023)  fosaprepitant (EMEND) IVPB, 150 mg, Intravenous, Once, 4 of 4 cycles  Administration: 150 mg (5/18/2023), 150 mg (6/29/2023), 150 mg (7/20/2023), 150 mg (6/8/2023)  nivolumab (OPDIVO) IVPB, 360 mg (150 % of original dose 240 mg), Intravenous, Once, 4 of 4 cycles  Dose modification: 360 mg (original dose 240 mg, Cycle 1, Reason: Dose modified as per discussion with consulting physician)  Administration: 360 mg (5/18/2023), 360 mg (6/8/2023), 360 mg (6/29/2023), 360 mg (7/20/2023)  CARBOplatin (PARAPLATIN) IVPB ([facility] [place] [facility] AUC DOSING), 642.5 mg, Intravenous, Once, 4 of 4 cycles  Administration: 642.5 mg (5/18/2023), 642.5 mg (6/29/2023), 566.5 mg (7/20/2023), 650 mg (6/8/2023)  pemetrexed (ALIMTA) chemo infusion, 980 mg, Intravenous, Once, 4 of 4  cycles  Administration: 1,000 mg (5/18/2023), 1,000 mg (6/29/2023), 1,000 mg (7/20/2023), 1,000 mg (6/8/2023)     9/1/2023 -  Cancer Staged    Staging form: Lung, AJCC 8th Edition  - Pathologic stage from 9/1/2023: Stage JAY (pT2b, pN2, cM1b) - Signed by Treva Bah PA-C on 9/20/2023  Histopathologic type: Adenocarcinoma, NOS  Stage prefix: Initial diagnosis  Histologic grade (G): G3  Histologic grading system: 4 grade system       9/1/2023 Surgery    Right robotic converted to open upper lobectomy      9/1/2023 Biopsy    A.  Lung, right upper lobe:     - Invasive poorly differentiated solid adenocarcinoma of the lung, 4.4 cm.     - Tumor invades into, but not through, the visceral pleura (PL1), confirmed by special VVG stains.     - Lymphatic channel invasion by tumor identified.     - Metastatic carcinoma present in three of thirteen lymph nodes (3/13).       -- Rare fibrotic granulomas present.     - All margins are negative for tumor.     - Emphysematous changes.     B.  Lymph node, level 8:     - Negative for malignancy (0/1).     C.  Lymph node, level 7, #1:     - Negative for malignancy (0/1).     D.  Lymph node, level 4R:     - Negative for malignancy (0/1).     E.  Lymph node, level 4R, #2:     - Fibrovascular adipose tissue; negative for malignancy.     - No lymphoid tissue identified.     F.  Lymph node, level 2R, #1:     - Metastatic carcinoma present in one of three lymph nodes (1/3).     G.  Lymph node, level 2R, #2:     - Negative for malignancy (0/1).     H.  Lymph node, level 11 posterior:     - Single lymph node positive for metastatic carcinoma (1/1).     I.  Lymph node, portion of level 12 on artery:     - Negative for malignancy (0/1).     - Non-caseating granulomatous inflammation present.        -- Special stains for acid fast bacilli and fungal organisms are negative.        10/11/2023 - 10/11/2023 Chemotherapy    alteplase (CATHFLO), 2 mg, Intracatheter, Every 1 Minute as needed, 0 of 6  cycles  nivolumab (OPDIVO) IVPB, 240 mg, Intravenous, Once, 0 of 6 cycles     10/13/2023 -  Chemotherapy    alteplase (CATHFLO), 2 mg, Intracatheter, Every 1 Minute as needed, 5 of 8 cycles  nivolumab (OPDIVO) IVPB, 480 mg, Intravenous, Once, 5 of 8 cycles  Administration: 480 mg (10/13/2023), 480 mg (11/10/2023), 480 mg (12/8/2023), 480 mg (1/5/2024), 480 mg (2/23/2024)            Subjective:    Patient ID: Paras Pitts is a 68 y.o. male. ECOG 0    HPI: Eugene is s/p robotic converted to right thoracotomy and right upper lobectomy on 9/1/2023 for poorly differentiated adenocarcinoma. Patient underwent neoadjuvant chemotherapy/immunoptherapy and surgical resection of the oligometastasic brain lesion followed by SRS. Following his lung resection he underwent adjuvant immunotherapy.    Patient had surveillance CT 3/5 which was concerning for new lower right paratracheal lymphadenopathy in the mediastinum, measuring 1.9 cm x 2.2 cm. PET CT was obtained 3/25 showing FDG avid mediastinal adenopathy.    He also recently had a brain MRI 3/26 whish showed increased enhancement and vasogenic edema around right frontal resection region. This was discussed at neuro-oncology tumor board where thought was the finding is due to treatment effect/necrosis rather than progression of disease.     Pt follows with Dr. Langford in medical oncology and Dr. Contreras in radiation oncology.    He is currently on steroids for left sided weakness related to treatment. He otherwise feels relatively well.  He reports mild SOB with exertion but does feel this has improved since surgery. He denies cough, hemoptysis, weight loss, n/v, CP      Patient was a previous smoker, quit 30 years ago.    The following portions of the patient's history were reviewed and updated as appropriate: allergies, current medications, past family history, past medical history, past social history, past surgical history, and problem list.    Review of Systems   Constitutional:   "Negative for chills, diaphoresis and unexpected weight change.   HENT: Negative.     Eyes: Negative.    Respiratory:  Positive for shortness of breath. Negative for cough and wheezing.    Cardiovascular:  Negative for chest pain and leg swelling.   Gastrointestinal:  Negative for abdominal pain, diarrhea and nausea.   Endocrine: Negative.    Genitourinary: Negative.    Musculoskeletal: Negative.    Skin: Negative.    Allergic/Immunologic: Negative.    Neurological:  Positive for weakness.        Left-sided weakness   Psychiatric/Behavioral: Negative.     All other systems reviewed and are negative.        Objective:   Physical Exam  Vitals reviewed.   Constitutional:       General: He is not in acute distress.     Appearance: Normal appearance. He is not ill-appearing.   HENT:      Head: Normocephalic.      Nose: Nose normal.      Mouth/Throat:      Mouth: Mucous membranes are moist.   Eyes:      Extraocular Movements: Extraocular movements intact.   Cardiovascular:      Rate and Rhythm: Normal rate and regular rhythm.      Heart sounds: Normal heart sounds.   Pulmonary:      Effort: Pulmonary effort is normal.      Breath sounds: Normal breath sounds. No wheezing, rhonchi or rales.   Abdominal:      Palpations: Abdomen is soft.   Musculoskeletal:         General: No swelling. Normal range of motion.   Skin:     General: Skin is warm.   Neurological:      Mental Status: He is alert and oriented to person, place, and time.   Psychiatric:         Mood and Affect: Mood normal.     /81 (BP Location: Right arm, Patient Position: Sitting, Cuff Size: Standard)   Pulse 71   Temp 98.1 °F (36.7 °C) (Temporal)   Resp 14   Ht 5' 9\" (1.753 m)   Wt 87.9 kg (193 lb 12.6 oz)   SpO2 97%   BMI 28.62 kg/m²     XR chest pa & lateral    Result Date: 9/28/2023  Impression Right pleural thickening versus small right pleural effusion. Workstation performed: [id number]     XR chest pa & lateral    Result Date: " 9/5/2023  Impression Right chest tube removal with no pneumothorax. Mild pleural thickening versus small loculated effusion along the right chest wall. Mild left base atelectasis. Workstation performed: [id number]      No CT Chest results available for this patient.  CT chest abdomen pelvis w contrast    Result Date: 3/5/2024  Narrative CT CHEST, ABDOMEN AND PELVIS WITH IV CONTRAST INDICATION: C34.91: Malignant neoplasm of unspecified part of right bronchus or lung. Previous history of resection of the right upper lobe lung patient on nivolumab COMPARISON: July 18, 2023 TECHNIQUE: CT examination of the chest, abdomen and pelvis was performed. Multiplanar 2D reformatted images were created from the source data. This examination, like all CT scans performed in the [facility], was performed utilizing techniques to minimize radiation dose exposure, including the use of iterative reconstruction and automated exposure control. Radiation dose length product (DLP) for this visit: 910.2 mGy-cm IV Contrast: 100 mL of iohexol (OMNIPAQUE) Enteric Contrast: Not administered. FINDINGS: CHEST LUNGS: Nodular density seen along the left lateral wall of the upper thoracic trachea. This measures 1 cm x 6 mm PLEURA: Unremarkable. HEART/GREAT VESSELS: Heart is unremarkable for patient's age. Ascending aorta measures 4 cm Brachiocephalic, left common carotid artery, subclavian arteries are patent Mild atherosclerotic plaque in the arch of the aorta tortuosity of the descending thoracic aorta seen MEDIASTINUM AND ROSEMARY: There are enlarged lower right paratracheal lymph nodes measuring about 1.9 x 2.2 cm Right-sided line seen with tip in the SVC CHEST WALL AND LOWER NECK: No significant axillary lymph node ABDOMEN LIVER/BILIARY TREE: Hepatic steatosis seen GALLBLADDER: No calcified gallstones. No pericholecystic inflammatory change. SPLEEN: Unremarkable. PANCREAS: Unremarkable. ADRENAL GLANDS: Unremarkable. KIDNEYS/URETERS:  Right renal cyst seen STOMACH AND BOWEL: Unremarkable. APPENDIX: No findings to suggest appendicitis. ABDOMINOPELVIC CAVITY: No ascites. No pneumoperitoneum. Small para-aortic lymph nodes do not meet the criteria for pathologic enlargement on the basis of size VESSELS: Unremarkable for patient's age. PELVIS REPRODUCTIVE ORGANS: Unremarkable for patient's age. URINARY BLADDER: Unremarkable. ABDOMINAL WALL/INGUINAL REGIONS: Umbilical hernia containing fat seen BONES: No lytic lesion Bilateral L5 pars defects with grade 1 anterolisthesis with severe bilateral foraminal narrowing    Impression There is new lower right paratracheal lymphadenopathy in the mediastinum,, measuring 1.9 cm x 2.2 cm. (Level 4R) this may be a neoplastic lymph node or may drug-induced granulomatous reaction, needs further characterization Nodular soft tissue seen along the left lateral upper thoracic trachea, image 25 series 2, indeterminate may be a focal lesion or adherent secretions. Consider follow-up CT at 3 months or characterization with bronchoscopy Postsurgical changes from the resection of the right upper lobe mass The study was marked in EPIC for significant notification. Workstation performed: [id number]      NM PET CT skull base to mid thigh    Result Date: 3/25/2024  Narrative PET/CT SCAN INDICATION: History of resected non-small cell lung cancer. C34.91: Malignant neoplasm of unspecified part of right bronchus or lung C79.9: Secondary malignant neoplasm of unspecified site MODIFIER: PS COMPARISON: PET/CT 7/18/2023, CT chest abdomen pelvis 3/5/2024 CELL TYPE: Metastatic adenocarcinoma TECHNIQUE:   8.0 mCi F-18-FDG administered IV. Multiplanar attenuation corrected and non attenuation corrected PET images are available for interpretation, and contiguous, low dose, axial CT sections were obtained from the skull vertex through the femurs. Intravenous contrast material was not utilized. This examination, like all CT scans performed in  the [facility], was performed utilizing techniques to minimize radiation dose exposure, including the use of iterative reconstruction and automated exposure control. Fasting serum glucose: 109 mg/dl FINDINGS: VISUALIZED BRAIN: Postsurgical changes from previous high right frontoparietal craniotomy. Associated encephalomalacia with relative photopenia on PET. Generalized limited evaluation for the brain parenchyma due to normal physiologic FDG uptake. HEAD/NECK: There is a physiologic distribution of FDG. No FDG avid cervical adenopathy is seen. CT images: Mild mucosal thickening of the bilateral maxillary sinuses. CHEST: FDG avid mediastinal lymphadenopathy. Abnormal lymphadenopathy was noted on recent CT. Right precarinal lymph node demonstrates a SUV max of 8.7. This measures up to 1.8 cm short axis image 112 series 3. No suspicious focal activity in the hilar regions. Postsurgical changes from the right upper lobectomy. No FDG avid pulmonary lesions. CT images: Right-sided Mediport line tip terminates at the SVC. No residual abnormality at the left upper trachea as previously seen likely secretions. Bilateral gynecomastia. ABDOMEN: No FDG avid soft tissue lesions are seen. CT images: Small fat-containing umbilical hernia. Small right renal cyst. PELVIS: No FDG avid soft tissue lesions are seen. CT images: Suggestion of a small fat-containing left inguinal hernia. OSSEOUS STRUCTURES: No FDG avid lesions are seen. CT images: Bilateral L5 pars defects with grade 1 anterolisthesis L5 on S1.     Impression 1. FDG-avid mediastinal lymphadenopathy as seen on recent CT compatible with metastasis. No residual abnormality at the left upper trachea as previously seen likely secretions. 2.  No findings for hypermetabolic metastatic disease to the neck, abdomen and pelvis. Workstation performed: [id number]     NM PET CT skull base to mid thigh    Result Date: 7/18/2023  Narrative PET/CT SCAN INDICATION:  C34.11: Malignant neoplasm of upper lobe, right bronchus or lung, metastatic right non-small cell lung cancer, status post neoadjuvant chemotherapy, resection of right frontal lobe mass. MODIFIER: PS COMPARISON: CTA chest June 27, 2023, MRI brain June 2023, PET/CT April 2023, CT chest abdomen and pelvis March 2023 CELL TYPE: Non-small cell neoplasm TECHNIQUE:   8.1 mCi F-18-FDG administered IV. Multiplanar attenuation corrected and non attenuation corrected PET images are available for interpretation, and contiguous, low dose, axial CT sections were obtained from the vertex through the femurs. Intravenous contrast material was not utilized. This examination, like all CT scans performed in the [facility], was performed utilizing techniques to minimize radiation dose exposure, including the use of iterative reconstruction and automated exposure control. Fasting serum glucose: 108 mg/dl FINDINGS: Mediastinal blood flow SUV max 2 Hepatic parenchyma SUV max 2.7 VISUALIZED BRAIN: Postsurgical changes, status post right frontal craniotomy. HEAD/NECK: There is a physiologic distribution of FDG. No FDG avid cervical adenopathy is seen. CT images: Unremarkable. CHEST: Hypermetabolic mass is again demonstrated in the right upper lobe with SUV max of 19 compared to 20 on the previous exam image 107. Right upper paratracheal node is nonspecific with SUV max of 1.9. Size is unchanged. There is interval resolution of hypermetabolic right hilar adenopathy. CT images: Right portacatheter. Right upper lobe mass measures 3.5 x 3.8 cm series 3/107, compared to 4 x 3.9 cm. Right paratracheal nodes, measuring 8 to 10 mm are unchanged and were not showing definite increased metabolic activity. Right hilar adenopathy cannot be assessed on unenhanced exam. Coronary artery calcifications. Gynecomastia. ABDOMEN: No FDG avid soft tissue lesions are seen. CT images: Atherosclerotic disease of the abdominal aorta without  aneurysmal dilatation. Fat-containing umbilical hernia. Fat-containing left inguinal hernia. PELVIS: No FDG avid soft tissue lesions are seen. CT images: Unremarkable. OSSEOUS STRUCTURES: No FDG avid lesions are seen. 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 Grade 1 anterolisthesis of L5 on S1.     Impression 1. Interval resolution of metabolic activity in association with right hilar nodes with slightly decrease in size of right upper lobe mass. 2. No evidence for metastatic disease to the abdomen or pelvis. 3. Small right paratracheal nodes with very minimal activity which is not diagnostic for malignancy and unchanged. Workstation performed: [id number]     NM PET CT skull base to mid thigh    Result Date: 4/28/2023  Narrative PET/CT SCAN INDICATION: C79.9: Secondary malignant neoplasm of unspecified site Newly diagnosed metastatic adenocarcinoma of the lung. Evaluate disease extent. MODIFIER: PS COMPARISON: No prior PET scans. Prior CT of chest abdomen and pelvis, 3/20/2023 CELL TYPE: Metastatic non-small cell carcinoma, diagnosed from resection of right frontal brain mass TECHNIQUE:   8.6 mCi F-18-FDG administered IV. Multiplanar attenuation corrected and non attenuation corrected PET images are available for interpretation, and contiguous, low dose, axial CT sections were obtained from the skull base through the femurs. Intravenous contrast material was not utilized. This examination, like all CT scans performed in the [facility], was performed utilizing techniques to minimize radiation dose exposure, including the use of iterative reconstruction and automated exposure control. Fasting serum glucose: 94 mg/dl FINDINGS: VISUALIZED BRAIN: Postsurgical changes, right frontal lobe. Please refer to prior MRI reports HEAD/NECK: There is a physiologic distribution of FDG. No FDG avid cervical adenopathy is seen. CT images: Unremarkable. CHEST: -Right upper  lobe lobular marginated 3.6 x 3.4 cm mass touching the right lateral pleural surface, SUV max 20.0. - Multifocal FDG avid right hilar adenopathy, SUV max 6.6 - Trace nonspecific activity within a right paratracheal lymph node which measures 0.9 cm in short axis dimension, SUV max 2.0. Otherwise no evidence of FDG avid mediastinal adenopathy CT images: Ecsdrz-r-Fizr catheter is present. No pleural or pericardial effusion. There is coronary artery calcification ABDOMEN: No FDG avid soft tissue lesions are seen. CT images: Shotty retroperitoneal lymph nodes demonstrate no abnormal activity. There is no ascites or hydronephrosis. There is an umbilical adipose containing hernia PELVIS: No FDG avid soft tissue lesions are seen. CT images: Small left inguinal adipose containing hernia. OSSEOUS STRUCTURES: No FDG avid lesions are seen. CT images: Postsurgical changes right frontal bone. Grade 1 anterolisthesis and degenerative disc disease at L5-S1. Than     Impression 1. FDG avid right upper lobe mass in keeping with primary bronchogenic carcinoma of the lung 2. FDG avid multifocal right hilar adenopathy 3. Trace nonspecific activity within a right paratracheal lymph node with short axis diameter 0.9 cm. 4. No evidence of FDG avid metastatic disease within the neck, abdomen, pelvis, or skeleton Workstation performed: [id number]      No Barium Swallow results available for this patient.      Amylyn Jena Mortimer, PA-C  4/10/2024  2:48 PM  Sign when Signing Visit  Thoracic Follow-Up  Assessment/Plan:    Carcinoma of right lung (HCC)      Recommend proceeding with bronchoscopy, EBUS with tissue biopsy 4/16. We discussed risks of surgery in detail including risks involved with anesthesia, bleeding, infection. Patient understood and wishes to proceed with surgery. All questions answered. Consent signed.        Diagnoses and all orders for this visit:    Carcinoma of right lung (HCC)  -     Case request operating room:  ENDOBRONCHIAL ULTRASOUND (EBUS), BRONCHOSCOPY FLEXIBLE; Standing  -     Case request operating room: ENDOBRONCHIAL ULTRASOUND (EBUS), BRONCHOSCOPY FLEXIBLE    Other orders  -     hydroCHLOROthiazide 25 mg tablet; Take 25 mg by mouth daily  -     Diet NPO; Sips with meds; Standing  -     Nursing communication Please give pre-op Carbohydrate drink to patient 2-4 hours prior to surgery; Standing  -     Void on call to OR; Standing  -     Insert peripheral IV; Standing  -     Place sequential compression device; Standing  -     heparin (porcine) subcutaneous injection 5,000 Units          Thoracic History       Cancer Staging   Carcinoma of right lung (HCC)  Staging form: Lung, AJCC 8th Edition  - Clinical: Stage JAY (cT3, cN1, cM1b) - Signed by James Contreras MD on 4/13/2023  - Pathologic stage from 9/1/2023: Stage JAY (pT2b, pN2, cM1b) - Signed by Treva Bah PA-C on 9/20/2023  Histopathologic type: Adenocarcinoma, NOS  Stage prefix: Initial diagnosis  Histologic grade (G): G3  Histologic grading system: 4 grade system  Oncology History   Metastatic adenocarcinoma (HCC)   2023 Initial Diagnosis    Metastatic adenocarcinoma (HCC)     3/23/2023 Biopsy    Brain, right frontal mass (biopsy):  - Metastatic non-small cell carcinoma     Comment:  - Tumor cells stain diffusely for CAM5.2, CKAE1/3, CK7, TTF1 and NapsinA with absent CK20, p63, CK5/6, p40 expression.  This immunopanel favors a metastatic lung adenocarcinoma.       5/18/2023 - 7/20/2023 Chemotherapy    cyanocobalamin, 1,000 mcg, Intramuscular, Once, 3 of 3 cycles  Administration: 1,000 mcg (6/29/2023), 1,000 mcg (5/18/2023), 1,000 mcg (7/20/2023)  alteplase (CATHFLO), 2 mg, Intracatheter, Every 1 Minute as needed, 4 of 4 cycles  pegfilgrastim (NEULASTA ONPRO), 6 mg, Subcutaneous, Once, 3 of 3 cycles  Administration: 6 mg (6/8/2023), 6 mg (6/29/2023), 6 mg (7/20/2023)  fosaprepitant (EMEND) IVPB, 150 mg, Intravenous, Once, 4 of 4 cycles  Administration:  150 mg (5/18/2023), 150 mg (6/29/2023), 150 mg (7/20/2023), 150 mg (6/8/2023)  nivolumab (OPDIVO) IVPB, 360 mg (150 % of original dose 240 mg), Intravenous, Once, 4 of 4 cycles  Dose modification: 360 mg (original dose 240 mg, Cycle 1, Reason: Dose modified as per discussion with consulting physician)  Administration: 360 mg (5/18/2023), 360 mg (6/8/2023), 360 mg (6/29/2023), 360 mg (7/20/2023)  CARBOplatin (PARAPLATIN) IVPB ([facility] AUC DOSING), 642.5 mg, Intravenous, Once, 4 of 4 cycles  Administration: 642.5 mg (5/18/2023), 642.5 mg (6/29/2023), 566.5 mg (7/20/2023), 650 mg (6/8/2023)  pemetrexed (ALIMTA) chemo infusion, 980 mg, Intravenous, Once, 4 of 4 cycles  Administration: 1,000 mg (5/18/2023), 1,000 mg (6/29/2023), 1,000 mg (7/20/2023), 1,000 mg (6/8/2023)     10/11/2023 - 10/11/2023 Chemotherapy    alteplase (CATHFLO), 2 mg, Intracatheter, Every 1 Minute as needed, 0 of 6 cycles  nivolumab (OPDIVO) IVPB, 240 mg, Intravenous, Once, 0 of 6 cycles     10/13/2023 -  Chemotherapy    alteplase (CATHFLO), 2 mg, Intracatheter, Every 1 Minute as needed, 5 of 8 cycles  nivolumab (OPDIVO) IVPB, 480 mg, Intravenous, Once, 5 of 8 cycles  Administration: 480 mg (10/13/2023), 480 mg (11/10/2023), 480 mg (12/8/2023), 480 mg (1/5/2024), 480 mg (2/23/2024)     Carcinoma of right lung (HCC)   4/13/2023 Initial Diagnosis    Carcinoma of right lung (HCC)     4/13/2023 -  Cancer Staged    Staging form: Lung, AJCC 8th Edition  - Clinical: Stage JAY (cT3, cN1, cM1b) - Signed by James Contreras MD on 4/13/2023 4/19/2023 - 4/28/2023 Radiation    Plan ID Energy Fractions Dose per Fraction (cGy) Dose Correction (cGy) Total Dose Delivered (cGy) Elapsed Days   SRT R Frontal 6X-FFF 5 / 5 600 0 3,000 9         5/18/2023 - 7/20/2023 Chemotherapy    cyanocobalamin, 1,000 mcg, Intramuscular, Once, 3 of 3 cycles  Administration: 1,000 mcg (6/29/2023), 1,000 mcg (5/18/2023), 1,000 mcg (7/20/2023)  alteplase (CATHFLO), 2 mg,  Intracatheter, Every 1 Minute as needed, 4 of 4 cycles  pegfilgrastim (NEULASTA ONPRO), 6 mg, Subcutaneous, Once, 3 of 3 cycles  Administration: 6 mg (6/8/2023), 6 mg (6/29/2023), 6 mg (7/20/2023)  fosaprepitant (EMEND) IVPB, 150 mg, Intravenous, Once, 4 of 4 cycles  Administration: 150 mg (5/18/2023), 150 mg (6/29/2023), 150 mg (7/20/2023), 150 mg (6/8/2023)  nivolumab (OPDIVO) IVPB, 360 mg (150 % of original dose 240 mg), Intravenous, Once, 4 of 4 cycles  Dose modification: 360 mg (original dose 240 mg, Cycle 1, Reason: Dose modified as per discussion with consulting physician)  Administration: 360 mg (5/18/2023), 360 mg (6/8/2023), 360 mg (6/29/2023), 360 mg (7/20/2023)  CARBOplatin (PARAPLATIN) IVPB ([facility] AUC DOSING), 642.5 mg, Intravenous, Once, 4 of 4 cycles  Administration: 642.5 mg (5/18/2023), 642.5 mg (6/29/2023), 566.5 mg (7/20/2023), 650 mg (6/8/2023)  pemetrexed (ALIMTA) chemo infusion, 980 mg, Intravenous, Once, 4 of 4 cycles  Administration: 1,000 mg (5/18/2023), 1,000 mg (6/29/2023), 1,000 mg (7/20/2023), 1,000 mg (6/8/2023)     9/1/2023 -  Cancer Staged    Staging form: Lung, AJCC 8th Edition  - Pathologic stage from 9/1/2023: Stage JAY (pT2b, pN2, cM1b) - Signed by Treva Bah PA-C on 9/20/2023  Histopathologic type: Adenocarcinoma, NOS  Stage prefix: Initial diagnosis  Histologic grade (G): G3  Histologic grading system: 4 grade system       9/1/2023 Surgery    Right robotic converted to open upper lobectomy      9/1/2023 Biopsy    A.  Lung, right upper lobe:     - Invasive poorly differentiated solid adenocarcinoma of the lung, 4.4 cm.     - Tumor invades into, but not through, the visceral pleura (PL1), confirmed by special VVG stains.     - Lymphatic channel invasion by tumor identified.     - Metastatic carcinoma present in three of thirteen lymph nodes (3/13).       -- Rare fibrotic granulomas present.     - All margins are negative for tumor.     - Emphysematous changes.     B.  Lymph  node, level 8:     - Negative for malignancy (0/1).     C.  Lymph node, level 7, #1:     - Negative for malignancy (0/1).     D.  Lymph node, level 4R:     - Negative for malignancy (0/1).     E.  Lymph node, level 4R, #2:     - Fibrovascular adipose tissue; negative for malignancy.     - No lymphoid tissue identified.     F.  Lymph node, level 2R, #1:     - Metastatic carcinoma present in one of three lymph nodes (1/3).     G.  Lymph node, level 2R, #2:     - Negative for malignancy (0/1).     H.  Lymph node, level 11 posterior:     - Single lymph node positive for metastatic carcinoma (1/1).     I.  Lymph node, portion of level 12 on artery:     - Negative for malignancy (0/1).     - Non-caseating granulomatous inflammation present.        -- Special stains for acid fast bacilli and fungal organisms are negative.        10/11/2023 - 10/11/2023 Chemotherapy    alteplase (CATHFLO), 2 mg, Intracatheter, Every 1 Minute as needed, 0 of 6 cycles  nivolumab (OPDIVO) IVPB, 240 mg, Intravenous, Once, 0 of 6 cycles     10/13/2023 -  Chemotherapy    alteplase (CATHFLO), 2 mg, Intracatheter, Every 1 Minute as needed, 5 of 8 cycles  nivolumab (OPDIVO) IVPB, 480 mg, Intravenous, Once, 5 of 8 cycles  Administration: 480 mg (10/13/2023), 480 mg (11/10/2023), 480 mg (12/8/2023), 480 mg (1/5/2024), 480 mg (2/23/2024)            Subjective:    Patient ID: Paras Pitts is a 68 y.o. male. ECOG 0    HPI: [place] is s/p robotic converted to right thoracotomy and right upper lobectomy on 9/1/2023 for what proved to be a stage III cancer***. Patient underwent neoadjuvant chemotherapy/immunoptherapy and surgical resection of the oligometastasic brain lesion followed by SRS. Following his surgical resection he underwent adjuvant immunotherapy.    Patient had surveillance CT 3/5 which was concerning for new lower right paratracheal lymphadenopathy in the mediastinum, measuring 1.9 cm x 2.2 cm. PET CT was obtained 3/25 showing FDG avid  mediastinal adenopathy.    He also recently had a brain MRI 3/26 whlopez showed increased enhancement and vasogenic edema around right frontal resection region. This was discussed at neuro-oncology tumor board where thought was the finding is due to treatment effect/necrosis rather than progression of disease.     Pt follows with Dr. Langford in medical oncology and Dr. Contreras in radiation oncology.    He is currently on steroids for left sided weakness related to treatment. He otherwise feels relatively well.  He reports mild SOB with exertion but does feel this has improved since surgery. He denies cough, hemoptysis, weight loss, n/v, CP      Patient was a previous smoker, quit 30 years ago.    The following portions of the patient's history were reviewed and updated as appropriate: allergies, current medications, past family history, past medical history, past social history, past surgical history, and problem list.    Review of Systems   Constitutional:  Negative for chills, diaphoresis and unexpected weight change.   HENT: Negative.     Eyes: Negative.    Respiratory:  Positive for shortness of breath. Negative for cough and wheezing.    Cardiovascular:  Negative for chest pain and leg swelling.   Gastrointestinal:  Negative for abdominal pain, diarrhea and nausea.   Endocrine: Negative.    Genitourinary: Negative.    Musculoskeletal: Negative.    Skin: Negative.    Allergic/Immunologic: Negative.    Neurological:  Positive for weakness.        Left-sided weakness   Psychiatric/Behavioral: Negative.     All other systems reviewed and are negative.        Objective:   Physical Exam  Vitals reviewed.   Constitutional:       General: He is not in acute distress.     Appearance: Normal appearance. He is not ill-appearing.   HENT:      Head: Normocephalic.      Nose: Nose normal.      Mouth/Throat:      Mouth: Mucous membranes are moist.   Eyes:      Extraocular Movements: Extraocular movements intact.   Cardiovascular:  "     Rate and Rhythm: Normal rate and regular rhythm.      Heart sounds: Normal heart sounds.   Pulmonary:      Effort: Pulmonary effort is normal.      Breath sounds: Normal breath sounds. No wheezing, rhonchi or rales.   Abdominal:      Palpations: Abdomen is soft.   Musculoskeletal:         General: No swelling. Normal range of motion.   Skin:     General: Skin is warm.   Neurological:      Mental Status: He is alert and oriented to person, place, and time.   Psychiatric:         Mood and Affect: Mood normal.     /81 (BP Location: Right arm, Patient Position: Sitting, Cuff Size: Standard)   Pulse 71   Temp 98.1 °F (36.7 °C) (Temporal)   Resp 14   Ht 5' 9\" (1.753 m)   Wt 87.9 kg (193 lb 12.6 oz)   SpO2 97%   BMI 28.62 kg/m²     XR chest pa & lateral    Result Date: 9/28/2023  Impression Right pleural thickening versus small right pleural effusion. Workstation performed: [id number]     XR chest pa & lateral    Result Date: 9/5/2023  Impression Right chest tube removal with no pneumothorax. Mild pleural thickening versus small loculated effusion along the right chest wall. Mild left base atelectasis. Workstation performed: [id number]      No CT Chest results available for this patient.  CT chest abdomen pelvis w contrast    Result Date: 3/5/2024  Narrative CT CHEST, ABDOMEN AND PELVIS WITH IV CONTRAST INDICATION: C34.91: Malignant neoplasm of unspecified part of right bronchus or lung. Previous history of resection of the right upper lobe lung patient on nivolumab COMPARISON: July 18, 2023 TECHNIQUE: CT examination of the chest, abdomen and pelvis was performed. Multiplanar 2D reformatted images were created from the source data. This examination, like all CT scans performed in the [facility], was performed utilizing techniques to minimize radiation dose exposure, including the use of iterative reconstruction and automated exposure control. Radiation dose length product (DLP) for " this visit: 910.2 mGy-cm IV Contrast: 100 mL of iohexol (OMNIPAQUE) Enteric Contrast: Not administered. FINDINGS: CHEST LUNGS: Nodular density seen along the left lateral wall of the upper thoracic trachea. This measures 1 cm x 6 mm PLEURA: Unremarkable. HEART/GREAT VESSELS: Heart is unremarkable for patient's age. Ascending aorta measures 4 cm Brachiocephalic, left common carotid artery, subclavian arteries are patent Mild atherosclerotic plaque in the arch of the aorta tortuosity of the descending thoracic aorta seen MEDIASTINUM AND ROSEMARY: There are enlarged lower right paratracheal lymph nodes measuring about 1.9 x 2.2 cm Right-sided line seen with tip in the SVC CHEST WALL AND LOWER NECK: No significant axillary lymph node ABDOMEN LIVER/BILIARY TREE: Hepatic steatosis seen GALLBLADDER: No calcified gallstones. No pericholecystic inflammatory change. SPLEEN: Unremarkable. PANCREAS: Unremarkable. ADRENAL GLANDS: Unremarkable. KIDNEYS/URETERS: Right renal cyst seen STOMACH AND BOWEL: Unremarkable. APPENDIX: No findings to suggest appendicitis. ABDOMINOPELVIC CAVITY: No ascites. No pneumoperitoneum. Small para-aortic lymph nodes do not meet the criteria for pathologic enlargement on the basis of size VESSELS: Unremarkable for patient's age. PELVIS REPRODUCTIVE ORGANS: Unremarkable for patient's age. URINARY BLADDER: Unremarkable. ABDOMINAL WALL/INGUINAL REGIONS: Umbilical hernia containing fat seen BONES: No lytic lesion Bilateral L5 pars defects with grade 1 anterolisthesis with severe bilateral foraminal narrowing    Impression There is new lower right paratracheal lymphadenopathy in the mediastinum,, measuring 1.9 cm x 2.2 cm. (Level 4R) this may be a neoplastic lymph node or may drug-induced granulomatous reaction, needs further characterization Nodular soft tissue seen along the left lateral upper thoracic trachea, image 25 series 2, indeterminate may be a focal lesion or adherent secretions. Consider follow-up  CT at 3 months or characterization with bronchoscopy Postsurgical changes from the resection of the right upper lobe mass The study was marked in EPIC for significant notification. Workstation performed: [id number]      NM PET CT skull base to mid thigh    Result Date: 3/25/2024  Narrative PET/CT SCAN INDICATION: History of resected non-small cell lung cancer. C34.91: Malignant neoplasm of unspecified part of right bronchus or lung C79.9: Secondary malignant neoplasm of unspecified site MODIFIER: PS COMPARISON: PET/CT 7/18/2023, CT chest abdomen pelvis 3/5/2024 CELL TYPE: Metastatic adenocarcinoma TECHNIQUE:   8.0 mCi F-18-FDG administered IV. Multiplanar attenuation corrected and non attenuation corrected PET images are available for interpretation, and contiguous, low dose, axial CT sections were obtained from the skull vertex through the femurs. Intravenous contrast material was not utilized. This examination, like all CT scans performed in the [facility], was performed utilizing techniques to minimize radiation dose exposure, including the use of iterative reconstruction and automated exposure control. Fasting serum glucose: 109 mg/dl FINDINGS: VISUALIZED BRAIN: Postsurgical changes from previous high right frontoparietal craniotomy. Associated encephalomalacia with relative photopenia on PET. Generalized limited evaluation for the brain parenchyma due to normal physiologic FDG uptake. HEAD/NECK: There is a physiologic distribution of FDG. No FDG avid cervical adenopathy is seen. CT images: Mild mucosal thickening of the bilateral maxillary sinuses. CHEST: FDG avid mediastinal lymphadenopathy. Abnormal lymphadenopathy was noted on recent CT. Right precarinal lymph node demonstrates a SUV max of 8.7. This measures up to 1.8 cm short axis image 112 series 3. No suspicious focal activity in the hilar regions. Postsurgical changes from the right upper lobectomy. No FDG avid pulmonary lesions. CT  images: Right-sided Mediport line tip terminates at the SVC. No residual abnormality at the left upper trachea as previously seen likely secretions. Bilateral gynecomastia. ABDOMEN: No FDG avid soft tissue lesions are seen. CT images: Small fat-containing umbilical hernia. Small right renal cyst. PELVIS: No FDG avid soft tissue lesions are seen. CT images: Suggestion of a small fat-containing left inguinal hernia. OSSEOUS STRUCTURES: No FDG avid lesions are seen. CT images: Bilateral L5 pars defects with grade 1 anterolisthesis L5 on S1.     Impression 1. FDG-avid mediastinal lymphadenopathy as seen on recent CT compatible with metastasis. No residual abnormality at the left upper trachea as previously seen likely secretions. 2.  No findings for hypermetabolic metastatic disease to the neck, abdomen and pelvis. Workstation performed: [id number]     NM PET CT skull base to mid thigh    Result Date: 7/18/2023  Narrative PET/CT SCAN INDICATION: C34.11: Malignant neoplasm of upper lobe, right bronchus or lung, metastatic right non-small cell lung cancer, status post neoadjuvant chemotherapy, resection of right frontal lobe mass. MODIFIER: PS COMPARISON: CTA chest June 27, 2023, MRI brain June 2023, PET/CT April 2023, CT chest abdomen and pelvis March 2023 CELL TYPE: Non-small cell neoplasm TECHNIQUE:   8.1 mCi F-18-FDG administered IV. Multiplanar attenuation corrected and non attenuation corrected PET images are available for interpretation, and contiguous, low dose, axial CT sections were obtained from the vertex through the femurs. Intravenous contrast material was not utilized. This examination, like all CT scans performed in the [facility], was performed utilizing techniques to minimize radiation dose exposure, including the use of iterative reconstruction and automated exposure control. Fasting serum glucose: 108 mg/dl FINDINGS: Mediastinal blood flow SUV max 2 Hepatic parenchyma SUV max 2.7  VISUALIZED BRAIN: Postsurgical changes, status post right frontal craniotomy. HEAD/NECK: There is a physiologic distribution of FDG. No FDG avid cervical adenopathy is seen. CT images: Unremarkable. CHEST: Hypermetabolic mass is again demonstrated in the right upper lobe with SUV max of 19 compared to 20 on the previous exam image 107. Right upper paratracheal node is nonspecific with SUV max of 1.9. Size is unchanged. There is interval resolution of hypermetabolic right hilar adenopathy. CT images: Right portacatheter. Right upper lobe mass measures 3.5 x 3.8 cm series 3/107, compared to 4 x 3.9 cm. Right paratracheal nodes, measuring 8 to 10 mm are unchanged and were not showing definite increased metabolic activity. Right hilar adenopathy cannot be assessed on unenhanced exam. Coronary artery calcifications. Gynecomastia. ABDOMEN: No FDG avid soft tissue lesions are seen. CT images: Atherosclerotic disease of the abdominal aorta without aneurysmal dilatation. Fat-containing umbilical hernia. Fat-containing left inguinal hernia. PELVIS: No FDG avid soft tissue lesions are seen. CT images: Unremarkable. OSSEOUS STRUCTURES: No FDG avid lesions are seen. 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 Grade 1 anterolisthesis of L5 on S1.     Impression 1. Interval resolution of metabolic activity in association with right hilar nodes with slightly decrease in size of right upper lobe mass. 2. No evidence for metastatic disease to the abdomen or pelvis. 3. Small right paratracheal nodes with very minimal activity which is not diagnostic for malignancy and unchanged. Workstation performed: [id number]     NM PET CT skull base to mid thigh    Result Date: 4/28/2023  Narrative PET/CT SCAN INDICATION: C79.9: Secondary malignant neoplasm of unspecified site Newly diagnosed metastatic adenocarcinoma of the lung. Evaluate disease extent. MODIFIER: PS COMPARISON: No prior PET scans. Prior CT  of chest abdomen and pelvis, 3/20/2023 CELL TYPE: Metastatic non-small cell carcinoma, diagnosed from resection of right frontal brain mass TECHNIQUE:   8.6 mCi F-18-FDG administered IV. Multiplanar attenuation corrected and non attenuation corrected PET images are available for interpretation, and contiguous, low dose, axial CT sections were obtained from the skull base through the femurs. Intravenous contrast material was not utilized. This examination, like all CT scans performed in the [facility], was performed utilizing techniques to minimize radiation dose exposure, including the use of iterative reconstruction and automated exposure control. Fasting serum glucose: 94 mg/dl FINDINGS: VISUALIZED BRAIN: Postsurgical changes, right frontal lobe. Please refer to prior MRI reports HEAD/NECK: There is a physiologic distribution of FDG. No FDG avid cervical adenopathy is seen. CT images: Unremarkable. CHEST: -Right upper lobe lobular marginated 3.6 x 3.4 cm mass touching the right lateral pleural surface, SUV max 20.0. - Multifocal FDG avid right hilar adenopathy, SUV max 6.6 - Trace nonspecific activity within a right paratracheal lymph node which measures 0.9 cm in short axis dimension, SUV max 2.0. Otherwise no evidence of FDG avid mediastinal adenopathy CT images: Sesomy-r-Wrsu catheter is present. No pleural or pericardial effusion. There is coronary artery calcification ABDOMEN: No FDG avid soft tissue lesions are seen. CT images: Shotty retroperitoneal lymph nodes demonstrate no abnormal activity. There is no ascites or hydronephrosis. There is an umbilical adipose containing hernia PELVIS: No FDG avid soft tissue lesions are seen. CT images: Small left inguinal adipose containing hernia. OSSEOUS STRUCTURES: No FDG avid lesions are seen. CT images: Postsurgical changes right frontal bone. Grade 1 anterolisthesis and degenerative disc disease at L5-S1. Than     Impression 1. FDG avid right  upper lobe mass in keeping with primary bronchogenic carcinoma of the lung 2. FDG avid multifocal right hilar adenopathy 3. Trace nonspecific activity within a right paratracheal lymph node with short axis diameter 0.9 cm. 4. No evidence of FDG avid metastatic disease within the neck, abdomen, pelvis, or skeleton Workstation performed: [id number]      No Barium Swallow results available for this patient.

## 2024-04-10 NOTE — ASSESSMENT & PLAN NOTE
68 y.o. male s/p robotic converted to right thoracotomy and right upper lobectomy on 9/1/2023 for poorly differentiated adenocarcinoma. Patient underwent neoadjuvant chemotherapy/immunoptherapy and surgical resection of the oligometastasic brain lesion followed by SRS. Following his lung resection he underwent adjuvant immunotherapy. Recent CT and PET scan are concerning for metastatic disease. Dr. Sparrwo reviewed patient's imaging and recommends tissue biopsy of the concerning right paratracheal lymph node. Recommend proceeding with bronchoscopy, EBUS with tissue biopsy 4/16. We discussed risks of surgery in detail including risks involved with anesthesia, bleeding, infection. Patient understood and wishes to proceed with surgery. All questions answered. Consent signed.

## 2024-04-10 NOTE — H&P (VIEW-ONLY)
Thoracic Follow-Up  Assessment/Plan:    Carcinoma of right lung (HCC)  68 y.o. male s/p robotic converted to right thoracotomy and right upper lobectomy on 9/1/2023 for poorly differentiated adenocarcinoma. Patient underwent neoadjuvant chemotherapy/immunoptherapy and surgical resection of the oligometastasic brain lesion followed by SRS. Following his lung resection he underwent adjuvant immunotherapy. Recent CT and PET scan are concerning for metastatic disease. Dr. Sparrow reviewed patient's imaging and recommends tissue biopsy of the concerning right paratracheal lymph node. Recommend proceeding with bronchoscopy, EBUS with tissue biopsy 4/16. We discussed risks of surgery in detail including risks involved with anesthesia, bleeding, infection. Patient understood and wishes to proceed with surgery. All questions answered. Consent signed.        Diagnoses and all orders for this visit:    Carcinoma of right lung (HCC)  -     Case request operating room: ENDOBRONCHIAL ULTRASOUND (EBUS), BRONCHOSCOPY FLEXIBLE; Standing  -     Case request operating room: ENDOBRONCHIAL ULTRASOUND (EBUS), BRONCHOSCOPY FLEXIBLE    Other orders  -     hydroCHLOROthiazide 25 mg tablet; Take 25 mg by mouth daily  -     Diet NPO; Sips with meds; Standing  -     Nursing communication Please give pre-op Carbohydrate drink to patient 2-4 hours prior to surgery; Standing  -     Void on call to OR; Standing  -     Insert peripheral IV; Standing  -     Place sequential compression device; Standing  -     heparin (porcine) subcutaneous injection 5,000 Units          Thoracic History        Cancer Staging   Carcinoma of right lung (HCC)  Staging form: Lung, AJCC 8th Edition  - Clinical: Stage JAY (cT3, cN1, cM1b) - Signed by James Contreras MD on 4/13/2023  - Pathologic stage from 9/1/2023: Stage JAY (pT2b, pN2, cM1b) - Signed by Treva Bah PA-C on 9/20/2023  Histopathologic type: Adenocarcinoma, NOS  Stage prefix: Initial  diagnosis  Histologic grade (G): G3  Histologic grading system: 4 grade system  Oncology History   Metastatic adenocarcinoma (HCC)   2023 Initial Diagnosis    Metastatic adenocarcinoma (HCC)     3/23/2023 Biopsy    Brain, right frontal mass (biopsy):  - Metastatic non-small cell carcinoma     Comment:  - Tumor cells stain diffusely for CAM5.2, CKAE1/3, CK7, TTF1 and NapsinA with absent CK20, p63, CK5/6, p40 expression.  This immunopanel favors a metastatic lung adenocarcinoma.       5/18/2023 - 7/20/2023 Chemotherapy    cyanocobalamin, 1,000 mcg, Intramuscular, Once, 3 of 3 cycles  Administration: 1,000 mcg (6/29/2023), 1,000 mcg (5/18/2023), 1,000 mcg (7/20/2023)  alteplase (CATHFLO), 2 mg, Intracatheter, Every 1 Minute as needed, 4 of 4 cycles  pegfilgrastim (NEULASTA ONPRO), 6 mg, Subcutaneous, Once, 3 of 3 cycles  Administration: 6 mg (6/8/2023), 6 mg (6/29/2023), 6 mg (7/20/2023)  fosaprepitant (EMEND) IVPB, 150 mg, Intravenous, Once, 4 of 4 cycles  Administration: 150 mg (5/18/2023), 150 mg (6/29/2023), 150 mg (7/20/2023), 150 mg (6/8/2023)  nivolumab (OPDIVO) IVPB, 360 mg (150 % of original dose 240 mg), Intravenous, Once, 4 of 4 cycles  Dose modification: 360 mg (original dose 240 mg, Cycle 1, Reason: Dose modified as per discussion with consulting physician)  Administration: 360 mg (5/18/2023), 360 mg (6/8/2023), 360 mg (6/29/2023), 360 mg (7/20/2023)  CARBOplatin (PARAPLATIN) IVPB (GOG AUC DOSING), 642.5 mg, Intravenous, Once, 4 of 4 cycles  Administration: 642.5 mg (5/18/2023), 642.5 mg (6/29/2023), 566.5 mg (7/20/2023), 650 mg (6/8/2023)  pemetrexed (ALIMTA) chemo infusion, 980 mg, Intravenous, Once, 4 of 4 cycles  Administration: 1,000 mg (5/18/2023), 1,000 mg (6/29/2023), 1,000 mg (7/20/2023), 1,000 mg (6/8/2023)     10/11/2023 - 10/11/2023 Chemotherapy    alteplase (CATHFLO), 2 mg, Intracatheter, Every 1 Minute as needed, 0 of 6 cycles  nivolumab (OPDIVO) IVPB, 240 mg, Intravenous, Once, 0 of 6  cycles     10/13/2023 -  Chemotherapy    alteplase (CATHFLO), 2 mg, Intracatheter, Every 1 Minute as needed, 5 of 8 cycles  nivolumab (OPDIVO) IVPB, 480 mg, Intravenous, Once, 5 of 8 cycles  Administration: 480 mg (10/13/2023), 480 mg (11/10/2023), 480 mg (12/8/2023), 480 mg (1/5/2024), 480 mg (2/23/2024)     Carcinoma of right lung (HCC)   4/13/2023 Initial Diagnosis    Carcinoma of right lung (HCC)     4/13/2023 -  Cancer Staged    Staging form: Lung, AJCC 8th Edition  - Clinical: Stage JAY (cT3, cN1, cM1b) - Signed by James Contreras MD on 4/13/2023 4/19/2023 - 4/28/2023 Radiation    Plan ID Energy Fractions Dose per Fraction (cGy) Dose Correction (cGy) Total Dose Delivered (cGy) Elapsed Days   SRT R Frontal 6X-FFF 5 / 5 600 0 3,000 9         5/18/2023 - 7/20/2023 Chemotherapy    cyanocobalamin, 1,000 mcg, Intramuscular, Once, 3 of 3 cycles  Administration: 1,000 mcg (6/29/2023), 1,000 mcg (5/18/2023), 1,000 mcg (7/20/2023)  alteplase (CATHFLO), 2 mg, Intracatheter, Every 1 Minute as needed, 4 of 4 cycles  pegfilgrastim (NEULASTA ONPRO), 6 mg, Subcutaneous, Once, 3 of 3 cycles  Administration: 6 mg (6/8/2023), 6 mg (6/29/2023), 6 mg (7/20/2023)  fosaprepitant (EMEND) IVPB, 150 mg, Intravenous, Once, 4 of 4 cycles  Administration: 150 mg (5/18/2023), 150 mg (6/29/2023), 150 mg (7/20/2023), 150 mg (6/8/2023)  nivolumab (OPDIVO) IVPB, 360 mg (150 % of original dose 240 mg), Intravenous, Once, 4 of 4 cycles  Dose modification: 360 mg (original dose 240 mg, Cycle 1, Reason: Dose modified as per discussion with consulting physician)  Administration: 360 mg (5/18/2023), 360 mg (6/8/2023), 360 mg (6/29/2023), 360 mg (7/20/2023)  CARBOplatin (PARAPLATIN) IVPB [place] [place]), 642.5 mg, Intravenous, Once, 4 of 4 cycles  Administration: 642.5 mg (5/18/2023), 642.5 mg (6/29/2023), 566.5 mg (7/20/2023), 650 mg (6/8/2023)  pemetrexed (ALIMTA) chemo infusion, 980 mg, Intravenous, Once, 4 of 4  cycles  Administration: 1,000 mg (5/18/2023), 1,000 mg (6/29/2023), 1,000 mg (7/20/2023), 1,000 mg (6/8/2023)     9/1/2023 -  Cancer Staged    Staging form: Lung, AJCC 8th Edition  - Pathologic stage from 9/1/2023: Stage JAY (pT2b, pN2, cM1b) - Signed by Treva Bah PA-C on 9/20/2023  Histopathologic type: Adenocarcinoma, NOS  Stage prefix: Initial diagnosis  Histologic grade (G): G3  Histologic grading system: 4 grade system       9/1/2023 Surgery    Right robotic converted to open upper lobectomy      9/1/2023 Biopsy    A.  Lung, right upper lobe:     - Invasive poorly differentiated solid adenocarcinoma of the lung, 4.4 cm.     - Tumor invades into, but not through, the visceral pleura (PL1), confirmed by special VVG stains.     - Lymphatic channel invasion by tumor identified.     - Metastatic carcinoma present in three of thirteen lymph nodes (3/13).       -- Rare fibrotic granulomas present.     - All margins are negative for tumor.     - Emphysematous changes.     B.  Lymph node, level 8:     - Negative for malignancy (0/1).     C.  Lymph node, level 7, #1:     - Negative for malignancy (0/1).     D.  Lymph node, level 4R:     - Negative for malignancy (0/1).     E.  Lymph node, level 4R, #2:     - Fibrovascular adipose tissue; negative for malignancy.     - No lymphoid tissue identified.     F.  Lymph node, level 2R, #1:     - Metastatic carcinoma present in one of three lymph nodes (1/3).     G.  Lymph node, level 2R, #2:     - Negative for malignancy (0/1).     H.  Lymph node, level 11 posterior:     - Single lymph node positive for metastatic carcinoma (1/1).     I.  Lymph node, portion of level 12 on artery:     - Negative for malignancy (0/1).     - Non-caseating granulomatous inflammation present.        -- Special stains for acid fast bacilli and fungal organisms are negative.        10/11/2023 - 10/11/2023 Chemotherapy    alteplase (CATHFLO), 2 mg, Intracatheter, Every 1 Minute as needed, 0 of 6  cycles  nivolumab (OPDIVO) IVPB, 240 mg, Intravenous, Once, 0 of 6 cycles     10/13/2023 -  Chemotherapy    alteplase (CATHFLO), 2 mg, Intracatheter, Every 1 Minute as needed, 5 of 8 cycles  nivolumab (OPDIVO) IVPB, 480 mg, Intravenous, Once, 5 of 8 cycles  Administration: 480 mg (10/13/2023), 480 mg (11/10/2023), 480 mg (12/8/2023), 480 mg (1/5/2024), 480 mg (2/23/2024)            Subjective:    Patient ID: Paras Pitts is a 68 y.o. male. ECOG 0    HPI: [place] is s/p robotic converted to right thoracotomy and right upper lobectomy on 9/1/2023 for poorly differentiated adenocarcinoma. Patient underwent neoadjuvant chemotherapy/immunoptherapy and surgical resection of the oligometastasic brain lesion followed by SRS. Following his lung resection he underwent adjuvant immunotherapy.    Patient had surveillance CT 3/5 which was concerning for new lower right paratracheal lymphadenopathy in the mediastinum, measuring 1.9 cm x 2.2 cm. PET CT was obtained 3/25 showing FDG avid mediastinal adenopathy.    He also recently had a brain MRI 3/26 whish showed increased enhancement and vasogenic edema around right frontal resection region. This was discussed at neuro-oncology tumor board where thought was the finding is due to treatment effect/necrosis rather than progression of disease.     Pt follows with Dr. Langford in medical oncology and Dr. Contreras in radiation oncology.    He is currently on steroids for left sided weakness related to treatment. He otherwise feels relatively well.  He reports mild SOB with exertion but does feel this has improved since surgery. He denies cough, hemoptysis, weight loss, n/v, CP      Patient was a previous smoker, quit 30 years ago.    The following portions of the patient's history were reviewed and updated as appropriate: allergies, current medications, past family history, past medical history, past social history, past surgical history, and problem list.    Review of Systems   Constitutional:   "Negative for chills, diaphoresis and unexpected weight change.   HENT: Negative.     Eyes: Negative.    Respiratory:  Positive for shortness of breath. Negative for cough and wheezing.    Cardiovascular:  Negative for chest pain and leg swelling.   Gastrointestinal:  Negative for abdominal pain, diarrhea and nausea.   Endocrine: Negative.    Genitourinary: Negative.    Musculoskeletal: Negative.    Skin: Negative.    Allergic/Immunologic: Negative.    Neurological:  Positive for weakness.        Left-sided weakness   Psychiatric/Behavioral: Negative.     All other systems reviewed and are negative.        Objective:   Physical Exam  Vitals reviewed.   Constitutional:       General: He is not in acute distress.     Appearance: Normal appearance. He is not ill-appearing.   HENT:      Head: Normocephalic.      Nose: Nose normal.      Mouth/Throat:      Mouth: Mucous membranes are moist.   Eyes:      Extraocular Movements: Extraocular movements intact.   Cardiovascular:      Rate and Rhythm: Normal rate and regular rhythm.      Heart sounds: Normal heart sounds.   Pulmonary:      Effort: Pulmonary effort is normal.      Breath sounds: Normal breath sounds. No wheezing, rhonchi or rales.   Abdominal:      Palpations: Abdomen is soft.   Musculoskeletal:         General: No swelling. Normal range of motion.   Skin:     General: Skin is warm.   Neurological:      Mental Status: He is alert and oriented to person, place, and time.   Psychiatric:         Mood and Affect: Mood normal.     /81 (BP Location: Right arm, Patient Position: Sitting, Cuff Size: Standard)   Pulse 71   Temp 98.1 °F (36.7 °C) (Temporal)   Resp 14   Ht 5' 9\" (1.753 m)   Wt 87.9 kg (193 lb 12.6 oz)   SpO2 97%   BMI 28.62 kg/m²     XR chest pa & lateral    Result Date: 9/28/2023  Impression Right pleural thickening versus small right pleural effusion. Workstation performed: [id number]     XR chest pa & lateral    Result Date: " 9/5/2023  Impression Right chest tube removal with no pneumothorax. Mild pleural thickening versus small loculated effusion along the right chest wall. Mild left base atelectasis. Workstation performed: [id number]      No CT Chest results available for this patient.  CT chest abdomen pelvis w contrast    Result Date: 3/5/2024  Narrative CT CHEST, ABDOMEN AND PELVIS WITH IV CONTRAST INDICATION: C34.91: Malignant neoplasm of unspecified part of right bronchus or lung. Previous history of resection of the right upper lobe lung patient on nivolumab COMPARISON: July 18, 2023 TECHNIQUE: CT examination of the chest, abdomen and pelvis was performed. Multiplanar 2D reformatted images were created from the source data. This examination, like all CT scans performed in the [facility], was performed utilizing techniques to minimize radiation dose exposure, including the use of iterative reconstruction and automated exposure control. Radiation dose length product (DLP) for this visit: 910.2 mGy-cm IV Contrast: 100 mL of iohexol (OMNIPAQUE) Enteric Contrast: Not administered. FINDINGS: CHEST LUNGS: Nodular density seen along the left lateral wall of the upper thoracic trachea. This measures 1 cm x 6 mm PLEURA: Unremarkable. HEART/GREAT VESSELS: Heart is unremarkable for patient's age. Ascending aorta measures 4 cm Brachiocephalic, left common carotid artery, subclavian arteries are patent Mild atherosclerotic plaque in the arch of the aorta tortuosity of the descending thoracic aorta seen MEDIASTINUM AND ROSEMARY: There are enlarged lower right paratracheal lymph nodes measuring about 1.9 x 2.2 cm Right-sided line seen with tip in the SVC CHEST WALL AND LOWER NECK: No significant axillary lymph node ABDOMEN LIVER/BILIARY TREE: Hepatic steatosis seen GALLBLADDER: No calcified gallstones. No pericholecystic inflammatory change. SPLEEN: Unremarkable. PANCREAS: Unremarkable. ADRENAL GLANDS: Unremarkable. KIDNEYS/URETERS:  Right renal cyst seen STOMACH AND BOWEL: Unremarkable. APPENDIX: No findings to suggest appendicitis. ABDOMINOPELVIC CAVITY: No ascites. No pneumoperitoneum. Small para-aortic lymph nodes do not meet the criteria for pathologic enlargement on the basis of size VESSELS: Unremarkable for patient's age. PELVIS REPRODUCTIVE ORGANS: Unremarkable for patient's age. URINARY BLADDER: Unremarkable. ABDOMINAL WALL/INGUINAL REGIONS: Umbilical hernia containing fat seen BONES: No lytic lesion Bilateral L5 pars defects with grade 1 anterolisthesis with severe bilateral foraminal narrowing    Impression There is new lower right paratracheal lymphadenopathy in the mediastinum,, measuring 1.9 cm x 2.2 cm. (Level 4R) this may be a neoplastic lymph node or may drug-induced granulomatous reaction, needs further characterization Nodular soft tissue seen along the left lateral upper thoracic trachea, image 25 series 2, indeterminate may be a focal lesion or adherent secretions. Consider follow-up CT at 3 months or characterization with bronchoscopy Postsurgical changes from the resection of the right upper lobe mass The study was marked in EPIC for significant notification. Workstation performed: [id number]      NM PET CT skull base to mid thigh    Result Date: 3/25/2024  Narrative PET/CT SCAN INDICATION: History of resected non-small cell lung cancer. C34.91: Malignant neoplasm of unspecified part of right bronchus or lung C79.9: Secondary malignant neoplasm of unspecified site MODIFIER: PS COMPARISON: PET/CT 7/18/2023, CT chest abdomen pelvis 3/5/2024 CELL TYPE: Metastatic adenocarcinoma TECHNIQUE:   8.0 mCi F-18-FDG administered IV. Multiplanar attenuation corrected and non attenuation corrected PET images are available for interpretation, and contiguous, low dose, axial CT sections were obtained from the skull vertex through the femurs. Intravenous contrast material was not utilized. This examination, like all CT scans performed in  the [facility], was performed utilizing techniques to minimize radiation dose exposure, including the use of iterative reconstruction and automated exposure control. Fasting serum glucose: 109 mg/dl FINDINGS: VISUALIZED BRAIN: Postsurgical changes from previous high right frontoparietal craniotomy. Associated encephalomalacia with relative photopenia on PET. Generalized limited evaluation for the brain parenchyma due to normal physiologic FDG uptake. HEAD/NECK: There is a physiologic distribution of FDG. No FDG avid cervical adenopathy is seen. CT images: Mild mucosal thickening of the bilateral maxillary sinuses. CHEST: FDG avid mediastinal lymphadenopathy. Abnormal lymphadenopathy was noted on recent CT. Right precarinal lymph node demonstrates a SUV max of 8.7. This measures up to 1.8 cm short axis image 112 series 3. No suspicious focal activity in the hilar regions. Postsurgical changes from the right upper lobectomy. No FDG avid pulmonary lesions. CT images: Right-sided Mediport line tip terminates at the SVC. No residual abnormality at the left upper trachea as previously seen likely secretions. Bilateral gynecomastia. ABDOMEN: No FDG avid soft tissue lesions are seen. CT images: Small fat-containing umbilical hernia. Small right renal cyst. PELVIS: No FDG avid soft tissue lesions are seen. CT images: Suggestion of a small fat-containing left inguinal hernia. OSSEOUS STRUCTURES: No FDG avid lesions are seen. CT images: Bilateral L5 pars defects with grade 1 anterolisthesis L5 on S1.     Impression 1. FDG-avid mediastinal lymphadenopathy as seen on recent CT compatible with metastasis. No residual abnormality at the left upper trachea as previously seen likely secretions. 2.  No findings for hypermetabolic metastatic disease to the neck, abdomen and pelvis. Workstation performed: [id number]     NM PET CT skull base to mid thigh    Result Date: 7/18/2023  Narrative PET/CT SCAN INDICATION:  C34.11: Malignant neoplasm of upper lobe, right bronchus or lung, metastatic right non-small cell lung cancer, status post neoadjuvant chemotherapy, resection of right frontal lobe mass. MODIFIER: PS COMPARISON: CTA chest June 27, 2023, MRI brain June 2023, PET/CT April 2023, CT chest abdomen and pelvis March 2023 CELL TYPE: Non-small cell neoplasm TECHNIQUE:   8.1 mCi F-18-FDG administered IV. Multiplanar attenuation corrected and non attenuation corrected PET images are available for interpretation, and contiguous, low dose, axial CT sections were obtained from the vertex through the femurs. Intravenous contrast material was not utilized. This examination, like all CT scans performed in the [facility], was performed utilizing techniques to minimize radiation dose exposure, including the use of iterative reconstruction and automated exposure control. Fasting serum glucose: 108 mg/dl FINDINGS: Mediastinal blood flow SUV max 2 Hepatic parenchyma SUV max 2.7 VISUALIZED BRAIN: Postsurgical changes, status post right frontal craniotomy. HEAD/NECK: There is a physiologic distribution of FDG. No FDG avid cervical adenopathy is seen. CT images: Unremarkable. CHEST: Hypermetabolic mass is again demonstrated in the right upper lobe with SUV max of 19 compared to 20 on the previous exam image 107. Right upper paratracheal node is nonspecific with SUV max of 1.9. Size is unchanged. There is interval resolution of hypermetabolic right hilar adenopathy. CT images: Right portacatheter. Right upper lobe mass measures 3.5 x 3.8 cm series 3/107, compared to 4 x 3.9 cm. Right paratracheal nodes, measuring 8 to 10 mm are unchanged and were not showing definite increased metabolic activity. Right hilar adenopathy cannot be assessed on unenhanced exam. Coronary artery calcifications. Gynecomastia. ABDOMEN: No FDG avid soft tissue lesions are seen. CT images: Atherosclerotic disease of the abdominal aorta without  aneurysmal dilatation. Fat-containing umbilical hernia. Fat-containing left inguinal hernia. PELVIS: No FDG avid soft tissue lesions are seen. CT images: Unremarkable. OSSEOUS STRUCTURES: No FDG avid lesions are seen. 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 Grade 1 anterolisthesis of L5 on S1.     Impression 1. Interval resolution of metabolic activity in association with right hilar nodes with slightly decrease in size of right upper lobe mass. 2. No evidence for metastatic disease to the abdomen or pelvis. 3. Small right paratracheal nodes with very minimal activity which is not diagnostic for malignancy and unchanged. Workstation performed: [id number]     NM PET CT skull base to mid thigh    Result Date: 4/28/2023  Narrative PET/CT SCAN INDICATION: C79.9: Secondary malignant neoplasm of unspecified site Newly diagnosed metastatic adenocarcinoma of the lung. Evaluate disease extent. MODIFIER: PS COMPARISON: No prior PET scans. Prior CT of chest abdomen and pelvis, 3/20/2023 CELL TYPE: Metastatic non-small cell carcinoma, diagnosed from resection of right frontal brain mass TECHNIQUE:   8.6 mCi F-18-FDG administered IV. Multiplanar attenuation corrected and non attenuation corrected PET images are available for interpretation, and contiguous, low dose, axial CT sections were obtained from the skull base through the femurs. Intravenous contrast material was not utilized. This examination, like all CT scans [facility], was performed utilizing techniques to minimize radiation dose exposure, including the use of iterative reconstruction and automated exposure control. Fasting serum glucose: 94 mg/dl FINDINGS: VISUALIZED BRAIN: Postsurgical changes, right frontal lobe. Please refer to prior MRI reports HEAD/NECK: There is a physiologic distribution of FDG. No FDG avid cervical adenopathy is seen. CT images: Unremarkable. CHEST: -Right upper  lobe lobular marginated 3.6 x 3.4 cm mass touching the right lateral pleural surface, SUV max 20.0. - Multifocal FDG avid right hilar adenopathy, SUV max 6.6 - Trace nonspecific activity within a right paratracheal lymph node which measures 0.9 cm in short axis dimension, SUV max 2.0. Otherwise no evidence of FDG avid mediastinal adenopathy CT images: Kcrtub-[id number]-Uaki catheter is present. No pleural or pericardial effusion. There is coronary artery calcification ABDOMEN: No FDG avid soft tissue lesions are seen. CT images: Shotty retroperitoneal lymph nodes demonstrate no abnormal activity. There is no ascites or hydronephrosis. There is an umbilical adipose containing hernia PELVIS: No FDG avid soft tissue lesions are seen. CT images: Small left inguinal adipose containing hernia. OSSEOUS STRUCTURES: No FDG avid lesions are seen. CT images: Postsurgical changes right frontal bone. Grade 1 anterolisthesis and degenerative disc disease at L5-S1. Than     Impression 1. FDG avid right upper lobe mass in keeping with primary bronchogenic carcinoma of the lung 2. FDG avid multifocal right hilar adenopathy 3. Trace nonspecific activity within a right paratracheal lymph node with short axis diameter 0.9 cm. 4. No evidence of FDG avid metastatic disease within the neck, abdomen, pelvis, or skeleton Workstation performed: [id number]      No Barium Swallow results available for this patient.

## 2024-04-10 NOTE — LETTER
April 10, 2024     Collin Langford MD  [address] [address]    Patient: Paras Pitts   YOB: 1955   Date of Visit: 4/10/2024       Dear Dr. Langford:    Please see below regarding mutual pt. Below are my notes for this consultation.    If you have questions, please do not hesitate to call me. I look forward to following your patient along with you.         Sincerely,        Kalia Sparrow MD        CC: No Recipients    Amylyn Jena Mortimer, PA-C  4/10/2024  4:53 PM  Sign when Signing Visit  Thoracic Follow-Up  Assessment/Plan:    Carcinoma of right lung (HCC)  68 y.o. male s/p robotic converted to right thoracotomy and right upper lobectomy on 9/1/2023 for poorly differentiated adenocarcinoma. Patient underwent neoadjuvant chemotherapy/immunoptherapy and surgical resection of the oligometastasic brain lesion followed by SRS. Following his lung resection he underwent adjuvant immunotherapy. Recent CT and PET scan are concerning for metastatic disease. Dr. Sparrow reviewed patient's imaging and recommends tissue biopsy of the concerning right paratracheal lymph node. Recommend proceeding with bronchoscopy, EBUS with tissue biopsy 4/16. We discussed risks of surgery in detail including risks involved with anesthesia, bleeding, infection. Patient understood and wishes to proceed with surgery. All questions answered. Consent signed.        Diagnoses and all orders for this visit:    Carcinoma of right lung (HCC)  -     Case request operating room: ENDOBRONCHIAL ULTRASOUND (EBUS), BRONCHOSCOPY FLEXIBLE; Standing  -     Case request operating room: ENDOBRONCHIAL ULTRASOUND (EBUS), BRONCHOSCOPY FLEXIBLE    Other orders  -     hydroCHLOROthiazide 25 mg tablet; Take 25 mg by mouth daily  -     Diet NPO; Sips with meds; Standing  -     Nursing communication Please give pre-op Carbohydrate drink to patient 2-4 hours prior to surgery; Standing  -     Void on call to OR; Standing  -      Insert peripheral IV; Standing  -     Place sequential compression device; Standing  -     heparin (porcine) subcutaneous injection 5,000 Units          Thoracic History       Cancer Staging   Carcinoma of right lung (HCC)  Staging form: Lung, AJCC 8th Edition  - Clinical: Stage JAY (cT3, cN1, cM1b) - Signed by James Contreras MD on 4/13/2023  - Pathologic stage from 9/1/2023: Stage JAY (pT2b, pN2, cM1b) - Signed by Treva Bah PA-C on 9/20/2023  Histopathologic type: Adenocarcinoma, NOS  Stage prefix: Initial diagnosis  Histologic grade (G): G3  Histologic grading system: 4 grade system  Oncology History   Metastatic adenocarcinoma (HCC)   2023 Initial Diagnosis    Metastatic adenocarcinoma (HCC)     3/23/2023 Biopsy    Brain, right frontal mass (biopsy):  - Metastatic non-small cell carcinoma     Comment:  - Tumor cells stain diffusely for CAM5.2, CKAE1/3, CK7, TTF1 and NapsinA with absent CK20, p63, CK5/6, p40 expression.  This immunopanel favors a metastatic lung adenocarcinoma.       5/18/2023 - 7/20/2023 Chemotherapy    cyanocobalamin, 1,000 mcg, Intramuscular, Once, 3 of 3 cycles  Administration: 1,000 mcg (6/29/2023), 1,000 mcg (5/18/2023), 1,000 mcg (7/20/2023)  alteplase (CATHFLO), 2 mg, Intracatheter, Every 1 Minute as needed, 4 of 4 cycles  pegfilgrastim (NEULASTA ONPRO), 6 mg, Subcutaneous, Once, 3 of 3 cycles  Administration: 6 mg (6/8/2023), 6 mg (6/29/2023), 6 mg (7/20/2023)  fosaprepitant (EMEND) IVPB, 150 mg, Intravenous, Once, 4 of 4 cycles  Administration: 150 mg (5/18/2023), 150 mg (6/29/2023), 150 mg (7/20/2023), 150 mg (6/8/2023)  nivolumab (OPDIVO) IVPB, 360 mg (150 % of original dose 240 mg), Intravenous, Once, 4 of 4 cycles  Dose modification: 360 mg (original dose 240 mg, Cycle 1, Reason: Dose modified as per discussion with consulting physician)  Administration: 360 mg (5/18/2023), 360 mg (6/8/2023), 360 mg (6/29/2023), 360 mg (7/20/2023)  CARBOplatin (PARAPLATIN) IVPB ([facility] AUC  DOSING), 642.5 mg, Intravenous, Once, 4 of 4 cycles  Administration: 642.5 mg (5/18/2023), 642.5 mg (6/29/2023), 566.5 mg (7/20/2023), 650 mg (6/8/2023)  pemetrexed (ALIMTA) chemo infusion, 980 mg, Intravenous, Once, 4 of 4 cycles  Administration: 1,000 mg (5/18/2023), 1,000 mg (6/29/2023), 1,000 mg (7/20/2023), 1,000 mg (6/8/2023)     10/11/2023 - 10/11/2023 Chemotherapy    alteplase (CATHFLO), 2 mg, Intracatheter, Every 1 Minute as needed, 0 of 6 cycles  nivolumab (OPDIVO) IVPB, 240 mg, Intravenous, Once, 0 of 6 cycles     10/13/2023 -  Chemotherapy    alteplase (CATHFLO), 2 mg, Intracatheter, Every 1 Minute as needed, 5 of 8 cycles  nivolumab (OPDIVO) IVPB, 480 mg, Intravenous, Once, 5 of 8 cycles  Administration: 480 mg (10/13/2023), 480 mg (11/10/2023), 480 mg (12/8/2023), 480 mg (1/5/2024), 480 mg (2/23/2024)     Carcinoma of right lung (HCC)   4/13/2023 Initial Diagnosis    Carcinoma of right lung (HCC)     4/13/2023 -  Cancer Staged    Staging form: Lung, AJCC 8th Edition  - Clinical: Stage JAY (cT3, cN1, cM1b) - Signed by James Contreras MD on 4/13/2023 4/19/2023 - 4/28/2023 Radiation    Plan ID Energy Fractions Dose per Fraction (cGy) Dose Correction (cGy) Total Dose Delivered (cGy) Elapsed Days   SRT R Frontal 6X-FFF 5 / 5 600 0 3,000 9         5/18/2023 - 7/20/2023 Chemotherapy    cyanocobalamin, 1,000 mcg, Intramuscular, Once, 3 of 3 cycles  Administration: 1,000 mcg (6/29/2023), 1,000 mcg (5/18/2023), 1,000 mcg (7/20/2023)  alteplase (CATHFLO), 2 mg, Intracatheter, Every 1 Minute as needed, 4 of 4 cycles  pegfilgrastim (NEULASTA ONPRO), 6 mg, Subcutaneous, Once, 3 of 3 cycles  Administration: 6 mg (6/8/2023), 6 mg (6/29/2023), 6 mg (7/20/2023)  fosaprepitant (EMEND) IVPB, 150 mg, Intravenous, Once, 4 of 4 cycles  Administration: 150 mg (5/18/2023), 150 mg (6/29/2023), 150 mg (7/20/2023), 150 mg (6/8/2023)  nivolumab (OPDIVO) IVPB, 360 mg (150 % of original dose 240 mg), Intravenous, Once, 4  of 4 cycles  Dose modification: 360 mg (original dose 240 mg, Cycle 1, Reason: Dose modified as per discussion with consulting physician)  Administration: 360 mg (5/18/2023), 360 mg (6/8/2023), 360 mg (6/29/2023), 360 mg (7/20/2023)  CARBOplatin (PARAPLATIN) IVPB ([facility] AUC DOSING), 642.5 mg, Intravenous, Once, 4 of 4 cycles  Administration: 642.5 mg (5/18/2023), 642.5 mg (6/29/2023), 566.5 mg (7/20/2023), 650 mg (6/8/2023)  pemetrexed (ALIMTA) chemo infusion, 980 mg, Intravenous, Once, 4 of 4 cycles  Administration: 1,000 mg (5/18/2023), 1,000 mg (6/29/2023), 1,000 mg (7/20/2023), 1,000 mg (6/8/2023)     9/1/2023 -  Cancer Staged    Staging form: Lung, AJCC 8th Edition  - Pathologic stage from 9/1/2023: Stage JAY (pT2b, pN2, cM1b) - Signed by Treva Bah PA-C on 9/20/2023  Histopathologic type: Adenocarcinoma, NOS  Stage prefix: Initial diagnosis  Histologic grade (G): G3  Histologic grading system: 4 grade system       9/1/2023 Surgery    Right robotic converted to open upper lobectomy      9/1/2023 Biopsy    A.  Lung, right upper lobe:     - Invasive poorly differentiated solid adenocarcinoma of the lung, 4.4 cm.     - Tumor invades into, but not through, the visceral pleura (PL1), confirmed by special VVG stains.     - Lymphatic channel invasion by tumor identified.     - Metastatic carcinoma present in three of thirteen lymph nodes (3/13).       -- Rare fibrotic granulomas present.     - All margins are negative for tumor.     - Emphysematous changes.     B.  Lymph node, level 8:     - Negative for malignancy (0/1).     C.  Lymph node, level 7, #1:     - Negative for malignancy (0/1).     D.  Lymph node, level 4R:     - Negative for malignancy (0/1).     E.  Lymph node, level 4R, #2:     - Fibrovascular adipose tissue; negative for malignancy.     - No lymphoid tissue identified.     F.  Lymph node, level 2R, #1:     - Metastatic carcinoma present in one of three lymph nodes (1/3).     G.  Lymph node, level  2R, #2:     - Negative for malignancy (0/1).     H.  Lymph node, level 11 posterior:     - Single lymph node positive for metastatic carcinoma (1/1).     I.  Lymph node, portion of level 12 on artery:     - Negative for malignancy (0/1).     - Non-caseating granulomatous inflammation present.        -- Special stains for acid fast bacilli and fungal organisms are negative.        10/11/2023 - 10/11/2023 Chemotherapy    alteplase (CATHFLO), 2 mg, Intracatheter, Every 1 Minute as needed, 0 of 6 cycles  nivolumab (OPDIVO) IVPB, 240 mg, Intravenous, Once, 0 of 6 cycles     10/13/2023 -  Chemotherapy    alteplase (CATHFLO), 2 mg, Intracatheter, Every 1 Minute as needed, 5 of 8 cycles  nivolumab (OPDIVO) IVPB, 480 mg, Intravenous, Once, 5 of 8 cycles  Administration: 480 mg (10/13/2023), 480 mg (11/10/2023), 480 mg (12/8/2023), 480 mg (1/5/2024), 480 mg (2/23/2024)            Subjective:    Patient ID: Paras Pitts is a 68 y.o. male. ECOG 0    HPI: [place] is s/p robotic converted to right thoracotomy and right upper lobectomy on 9/1/2023 for poorly differentiated adenocarcinoma. Patient underwent neoadjuvant chemotherapy/immunoptherapy and surgical resection of the oligometastasic brain lesion followed by SRS. Following his lung resection he underwent adjuvant immunotherapy.    Patient had surveillance CT 3/5 which was concerning for new lower right paratracheal lymphadenopathy in the mediastinum, measuring 1.9 cm x 2.2 cm. PET CT was obtained 3/25 showing FDG avid mediastinal adenopathy.    He also recently had a brain MRI 3/26 whish showed increased enhancement and vasogenic edema around right frontal resection region. This was discussed at neuro-oncology tumor board where thought was the finding is due to treatment effect/necrosis rather than progression of disease.     Pt follows with Dr. Langford in medical oncology and Dr. Contreras in radiation oncology.    He is currently on steroids for left sided weakness related to  treatment. He otherwise feels relatively well.  He reports mild SOB with exertion but does feel this has improved since surgery. He denies cough, hemoptysis, weight loss, n/v, CP      Patient was a previous smoker, quit 30 years ago.    The following portions of the patient's history were reviewed and updated as appropriate: allergies, current medications, past family history, past medical history, past social history, past surgical history, and problem list.    Review of Systems   Constitutional:  Negative for chills, diaphoresis and unexpected weight change.   HENT: Negative.     Eyes: Negative.    Respiratory:  Positive for shortness of breath. Negative for cough and wheezing.    Cardiovascular:  Negative for chest pain and leg swelling.   Gastrointestinal:  Negative for abdominal pain, diarrhea and nausea.   Endocrine: Negative.    Genitourinary: Negative.    Musculoskeletal: Negative.    Skin: Negative.    Allergic/Immunologic: Negative.    Neurological:  Positive for weakness.        Left-sided weakness   Psychiatric/Behavioral: Negative.     All other systems reviewed and are negative.        Objective:   Physical Exam  Vitals reviewed.   Constitutional:       General: He is not in acute distress.     Appearance: Normal appearance. He is not ill-appearing.   HENT:      Head: Normocephalic.      Nose: Nose normal.      Mouth/Throat:      Mouth: Mucous membranes are moist.   Eyes:      Extraocular Movements: Extraocular movements intact.   Cardiovascular:      Rate and Rhythm: Normal rate and regular rhythm.      Heart sounds: Normal heart sounds.   Pulmonary:      Effort: Pulmonary effort is normal.      Breath sounds: Normal breath sounds. No wheezing, rhonchi or rales.   Abdominal:      Palpations: Abdomen is soft.   Musculoskeletal:         General: No swelling. Normal range of motion.   Skin:     General: Skin is warm.   Neurological:      Mental Status: He is alert and oriented to person, place, and time.  "  Psychiatric:         Mood and Affect: Mood normal.     /81 (BP Location: Right arm, Patient Position: Sitting, Cuff Size: Standard)   Pulse 71   Temp 98.1 °F (36.7 °C) (Temporal)   Resp 14   Ht 5' 9\" (1.753 m)   Wt 87.9 kg (193 lb 12.6 oz)   SpO2 97%   BMI 28.62 kg/m²     XR chest pa & lateral    Result Date: 9/28/2023  Impression Right pleural thickening versus small right pleural effusion. Workstation performed: [id number]     XR chest pa & lateral    Result Date: 9/5/2023  Impression Right chest tube removal with no pneumothorax. Mild pleural thickening versus small loculated effusion along the right chest wall. Mild left base atelectasis. Workstation performed: [id number]      No CT Chest results available for this patient.  CT chest abdomen pelvis w contrast    Result Date: 3/5/2024  Narrative CT CHEST, ABDOMEN AND PELVIS WITH IV CONTRAST INDICATION: C34.91: Malignant neoplasm of unspecified part of right bronchus or lung. Previous history of resection of the right upper lobe lung patient on nivolumab COMPARISON: July 18, 2023 TECHNIQUE: CT examination of the chest, abdomen and pelvis was performed. Multiplanar 2D reformatted images were created from the source data. This examination, like all CT scans performed in the [facility], was performed utilizing techniques to minimize radiation dose exposure, including the use of iterative reconstruction and automated exposure control. Radiation dose length product (DLP) for this visit: 910.2 mGy-cm IV Contrast: 100 mL of iohexol (OMNIPAQUE) Enteric Contrast: Not administered. FINDINGS: CHEST LUNGS: Nodular density seen along the left lateral wall of the upper thoracic trachea. This measures 1 cm x 6 mm PLEURA: Unremarkable. HEART/GREAT VESSELS: Heart is unremarkable for patient's age. Ascending aorta measures 4 cm Brachiocephalic, left common carotid artery, subclavian arteries are patent Mild atherosclerotic plaque in the arch of " the aorta tortuosity of the descending thoracic aorta seen MEDIASTINUM AND ROSEMARY: There are enlarged lower right paratracheal lymph nodes measuring about 1.9 x 2.2 cm Right-sided line seen with tip in the SVC CHEST WALL AND LOWER NECK: No significant axillary lymph node ABDOMEN LIVER/BILIARY TREE: Hepatic steatosis seen GALLBLADDER: No calcified gallstones. No pericholecystic inflammatory change. SPLEEN: Unremarkable. PANCREAS: Unremarkable. ADRENAL GLANDS: Unremarkable. KIDNEYS/URETERS: Right renal cyst seen STOMACH AND BOWEL: Unremarkable. APPENDIX: No findings to suggest appendicitis. ABDOMINOPELVIC CAVITY: No ascites. No pneumoperitoneum. Small para-aortic lymph nodes do not meet the criteria for pathologic enlargement on the basis of size VESSELS: Unremarkable for patient's age. PELVIS REPRODUCTIVE ORGANS: Unremarkable for patient's age. URINARY BLADDER: Unremarkable. ABDOMINAL WALL/INGUINAL REGIONS: Umbilical hernia containing fat seen BONES: No lytic lesion Bilateral L5 pars defects with grade 1 anterolisthesis with severe bilateral foraminal narrowing    Impression There is new lower right paratracheal lymphadenopathy in the mediastinum,, measuring 1.9 cm x 2.2 cm. (Level 4R) this may be a neoplastic lymph node or may drug-induced granulomatous reaction, needs further characterization Nodular soft tissue seen along the left lateral upper thoracic trachea, image 25 series 2, indeterminate may be a focal lesion or adherent secretions. Consider follow-up CT at 3 months or characterization with bronchoscopy Postsurgical changes from the resection of the right upper lobe mass The study was marked in EPIC for significant notification. Workstation performed: [id number]      NM PET CT skull base to mid thigh    Result Date: 3/25/2024  Narrative PET/CT SCAN INDICATION: History of resected non-small cell lung cancer. C34.91: Malignant neoplasm of unspecified part of right bronchus or lung C79.9: Secondary malignant  neoplasm of unspecified site MODIFIER: PS COMPARISON: PET/CT 7/18/2023, CT chest abdomen pelvis 3/5/2024 CELL TYPE: Metastatic adenocarcinoma TECHNIQUE:   8.0 mCi F-18-FDG administered IV. Multiplanar attenuation corrected and non attenuation corrected PET images are available for interpretation, and contiguous, low dose, axial CT sections were obtained from the skull vertex through the femurs. Intravenous contrast material was not utilized. This examination, like all CT scans performed [facility], was performed utilizing techniques to minimize radiation dose exposure, including the use of iterative reconstruction and automated exposure control. Fasting serum glucose: 109 mg/dl FINDINGS: VISUALIZED BRAIN: Postsurgical changes from previous high right frontoparietal craniotomy. Associated encephalomalacia with relative photopenia on PET. Generalized limited evaluation for the brain parenchyma due to normal physiologic FDG uptake. HEAD/NECK: There is a physiologic distribution of FDG. No FDG avid cervical adenopathy is seen. CT images: Mild mucosal thickening of the bilateral maxillary sinuses. CHEST: FDG avid mediastinal lymphadenopathy. Abnormal lymphadenopathy was noted on recent CT. Right precarinal lymph node demonstrates a SUV max of 8.7. This measures up to 1.8 cm short axis image 112 series 3. No suspicious focal activity in the hilar regions. Postsurgical changes from the right upper lobectomy. No FDG avid pulmonary lesions. CT images: Right-sided Mediport line tip terminates at the SVC. No residual abnormality at the left upper trachea as previously seen likely secretions. Bilateral gynecomastia. ABDOMEN: No FDG avid soft tissue lesions are seen. CT images: Small fat-containing umbilical hernia. Small right renal cyst. PELVIS: No FDG avid soft tissue lesions are seen. CT images: Suggestion of a small fat-containing left inguinal hernia. OSSEOUS STRUCTURES: No FDG avid lesions are  seen. CT images: Bilateral L5 pars defects with grade 1 anterolisthesis L5 on S1.     Impression 1. FDG-avid mediastinal lymphadenopathy as seen on recent CT compatible with metastasis. No residual abnormality at the left upper trachea as previously seen likely secretions. 2.  No findings for hypermetabolic metastatic disease to the neck, abdomen and pelvis. Workstation performed: [id number]     NM PET CT skull base to mid thigh    Result Date: 7/18/2023  Narrative PET/CT SCAN INDICATION: C34.11: Malignant neoplasm of upper lobe, right bronchus or lung, metastatic right non-small cell lung cancer, status post neoadjuvant chemotherapy, resection of right frontal lobe mass. MODIFIER: PS COMPARISON: CTA chest June 27, 2023, MRI brain June 2023, PET/CT April 2023, CT chest abdomen and pelvis March 2023 CELL TYPE: Non-small cell neoplasm TECHNIQUE:   8.1 mCi F-18-FDG administered IV. Multiplanar attenuation corrected and non attenuation corrected PET images are available for interpretation, and contiguous, low dose, axial CT sections were obtained from the vertex through the femurs. Intravenous contrast material was not utilized. This examination, like all CT scans performed in the [facility], was performed utilizing techniques to minimize radiation dose exposure, including the use of iterative reconstruction and automated exposure control. Fasting serum glucose: 108 mg/dl FINDINGS: Mediastinal blood flow SUV max 2 Hepatic parenchyma SUV max 2.7 VISUALIZED BRAIN: Postsurgical changes, status post right frontal craniotomy. HEAD/NECK: There is a physiologic distribution of FDG. No FDG avid cervical adenopathy is seen. CT images: Unremarkable. CHEST: Hypermetabolic mass is again demonstrated in the right upper lobe with SUV max of 19 compared to 20 on the previous exam image 107. Right upper paratracheal node is nonspecific with SUV max of 1.9. Size is unchanged. There is interval resolution of  hypermetabolic right hilar adenopathy. CT images: Right portacatheter. Right upper lobe mass measures 3.5 x 3.8 cm series 3/107, compared to 4 x 3.9 cm. Right paratracheal nodes, measuring 8 to 10 mm are unchanged and were not showing definite increased metabolic activity. Right hilar adenopathy cannot be assessed on unenhanced exam. Coronary artery calcifications. Gynecomastia. ABDOMEN: No FDG avid soft tissue lesions are seen. CT images: Atherosclerotic disease of the abdominal aorta without aneurysmal dilatation. Fat-containing umbilical hernia. Fat-containing left inguinal hernia. PELVIS: No FDG avid soft tissue lesions are seen. CT images: Unremarkable. OSSEOUS STRUCTURES: No FDG avid lesions are seen. 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 Grade 1 anterolisthesis of L5 on S1.     Impression 1. Interval resolution of metabolic activity in association with right hilar nodes with slightly decrease in size of right upper lobe mass. 2. No evidence for metastatic disease to the abdomen or pelvis. 3. Small right paratracheal nodes with very minimal activity which is not diagnostic for malignancy and unchanged. Workstation performed: [id number]     NM PET CT skull base to mid thigh    Result Date: 4/28/2023  Narrative PET/CT SCAN INDICATION: C79.9: Secondary malignant neoplasm of unspecified site Newly diagnosed metastatic adenocarcinoma of the lung. Evaluate disease extent. MODIFIER: PS COMPARISON: No prior PET scans. Prior CT of chest abdomen and pelvis, 3/20/2023 CELL TYPE: Metastatic non-small cell carcinoma, diagnosed from resection of right frontal brain mass TECHNIQUE:   8.6 mCi F-18-FDG administered IV. Multiplanar attenuation corrected and non attenuation corrected PET images are available for interpretation, and contiguous, low dose, axial CT sections were obtained from the skull base through the femurs. Intravenous contrast material was not utilized. This  examination, like all CT scans performed in the [facility], was performed utilizing techniques to minimize radiation dose exposure, including the use of iterative reconstruction and automated exposure control. Fasting serum glucose: 94 mg/dl FINDINGS: VISUALIZED BRAIN: Postsurgical changes, right frontal lobe. Please refer to prior MRI reports HEAD/NECK: There is a physiologic distribution of FDG. No FDG avid cervical adenopathy is seen. CT images: Unremarkable. CHEST: -Right upper lobe lobular marginated 3.6 x 3.4 cm mass touching the right lateral pleural surface, SUV max 20.0. - Multifocal FDG avid right hilar adenopathy, SUV max 6.6 - Trace nonspecific activity within a right paratracheal lymph node which measures 0.9 cm in short axis dimension, SUV max 2.0. Otherwise no evidence of FDG avid mediastinal adenopathy CT images: Myfkci-q-Wcys catheter is present. No pleural or pericardial effusion. There is coronary artery calcification ABDOMEN: No FDG avid soft tissue lesions are seen. CT images: Shotty retroperitoneal lymph nodes demonstrate no abnormal activity. There is no ascites or hydronephrosis. There is an umbilical adipose containing hernia PELVIS: No FDG avid soft tissue lesions are seen. CT images: Small left inguinal adipose containing hernia. OSSEOUS STRUCTURES: No FDG avid lesions are seen. CT images: Postsurgical changes right frontal bone. Grade 1 anterolisthesis and degenerative disc disease at L5-S1. Than     Impression 1. FDG avid right upper lobe mass in keeping with primary bronchogenic carcinoma of the lung 2. FDG avid multifocal right hilar adenopathy 3. Trace nonspecific activity within a right paratracheal lymph node with short axis diameter 0.9 cm. 4. No evidence of FDG avid metastatic disease within the neck, abdomen, pelvis, or skeleton Workstation performed: [id number]      No Barium Swallow results available for this patient.

## 2024-04-10 NOTE — PROGRESS NOTES
Thoracic Follow-Up  Assessment/Plan:    Carcinoma of right lung (HCC)  68 y.o. male s/p robotic converted to right thoracotomy and right upper lobectomy on 9/1/2023 for poorly differentiated adenocarcinoma. Patient underwent neoadjuvant chemotherapy/immunoptherapy and surgical resection of the oligometastasic brain lesion followed by SRS. Following his lung resection he underwent adjuvant immunotherapy. Recent CT and PET scan are concerning for metastatic disease. Dr. Sparrow reviewed patient's imaging and recommends tissue biopsy of the concerning right paratracheal lymph node. Recommend proceeding with bronchoscopy, EBUS with tissue biopsy 4/16. We discussed risks of surgery in detail including risks involved with anesthesia, bleeding, infection. Patient understood and wishes to proceed with surgery. All questions answered. Consent signed.        Diagnoses and all orders for this visit:    Carcinoma of right lung (HCC)  -     Case request operating room: ENDOBRONCHIAL ULTRASOUND (EBUS), BRONCHOSCOPY FLEXIBLE; Standing  -     Case request operating room: ENDOBRONCHIAL ULTRASOUND (EBUS), BRONCHOSCOPY FLEXIBLE    Other orders  -     hydroCHLOROthiazide 25 mg tablet; Take 25 mg by mouth daily  -     Diet NPO; Sips with meds; Standing  -     Nursing communication Please give pre-op Carbohydrate drink to patient 2-4 hours prior to surgery; Standing  -     Void on call to OR; Standing  -     Insert peripheral IV; Standing  -     Place sequential compression device; Standing  -     heparin (porcine) subcutaneous injection 5,000 Units          Thoracic History        Cancer Staging   Carcinoma of right lung (HCC)  Staging form: Lung, AJCC 8th Edition  - Clinical: Stage JAY (cT3, cN1, cM1b) - Signed by James Contreras MD on 4/13/2023  - Pathologic stage from 9/1/2023: Stage JAY (pT2b, pN2, cM1b) - Signed by Treva Bah PA-C on 9/20/2023  Histopathologic type: Adenocarcinoma, NOS  Stage prefix: Initial  diagnosis  Histologic grade (G): G3  Histologic grading system: 4 grade system  Oncology History   Metastatic adenocarcinoma (HCC)   2023 Initial Diagnosis    Metastatic adenocarcinoma (HCC)     3/23/2023 Biopsy    Brain, right frontal mass (biopsy):  - Metastatic non-small cell carcinoma     Comment:  - Tumor cells stain diffusely for CAM5.2, CKAE1/3, CK7, TTF1 and NapsinA with absent CK20, p63, CK5/6, p40 expression.  This immunopanel favors a metastatic lung adenocarcinoma.       5/18/2023 - 7/20/2023 Chemotherapy    cyanocobalamin, 1,000 mcg, Intramuscular, Once, 3 of 3 cycles  Administration: 1,000 mcg (6/29/2023), 1,000 mcg (5/18/2023), 1,000 mcg (7/20/2023)  alteplase (CATHFLO), 2 mg, Intracatheter, Every 1 Minute as needed, 4 of 4 cycles  pegfilgrastim (NEULASTA ONPRO), 6 mg, Subcutaneous, Once, 3 of 3 cycles  Administration: 6 mg (6/8/2023), 6 mg (6/29/2023), 6 mg (7/20/2023)  fosaprepitant (EMEND) IVPB, 150 mg, Intravenous, Once, 4 of 4 cycles  Administration: 150 mg (5/18/2023), 150 mg (6/29/2023), 150 mg (7/20/2023), 150 mg (6/8/2023)  nivolumab (OPDIVO) IVPB, 360 mg (150 % of original dose 240 mg), Intravenous, Once, 4 of 4 cycles  Dose modification: 360 mg (original dose 240 mg, Cycle 1, Reason: Dose modified as per discussion with consulting physician)  Administration: 360 mg (5/18/2023), 360 mg (6/8/2023), 360 mg (6/29/2023), 360 mg (7/20/2023)  CARBOplatin (PARAPLATIN) IVPB (GOG AUC DOSING), 642.5 mg, Intravenous, Once, 4 of 4 cycles  Administration: 642.5 mg (5/18/2023), 642.5 mg (6/29/2023), 566.5 mg (7/20/2023), 650 mg (6/8/2023)  pemetrexed (ALIMTA) chemo infusion, 980 mg, Intravenous, Once, 4 of 4 cycles  Administration: 1,000 mg (5/18/2023), 1,000 mg (6/29/2023), 1,000 mg (7/20/2023), 1,000 mg (6/8/2023)     10/11/2023 - 10/11/2023 Chemotherapy    alteplase (CATHFLO), 2 mg, Intracatheter, Every 1 Minute as needed, 0 of 6 cycles  nivolumab (OPDIVO) IVPB, 240 mg, Intravenous, Once, 0 of 6  cycles     10/13/2023 -  Chemotherapy    alteplase (CATHFLO), 2 mg, Intracatheter, Every 1 Minute as needed, 5 of 8 cycles  nivolumab (OPDIVO) IVPB, 480 mg, Intravenous, Once, 5 of 8 cycles  Administration: 480 mg (10/13/2023), 480 mg (11/10/2023), 480 mg (12/8/2023), 480 mg (1/5/2024), 480 mg (2/23/2024)     Carcinoma of right lung (HCC)   4/13/2023 Initial Diagnosis    Carcinoma of right lung (HCC)     4/13/2023 -  Cancer Staged    Staging form: Lung, AJCC 8th Edition  - Clinical: Stage JAY (cT3, cN1, cM1b) - Signed by James Contreras MD on 4/13/2023 4/19/2023 - 4/28/2023 Radiation    Plan ID Energy Fractions Dose per Fraction (cGy) Dose Correction (cGy) Total Dose Delivered (cGy) Elapsed Days   SRT R Frontal 6X-FFF 5 / 5 600 0 3,000 9         5/18/2023 - 7/20/2023 Chemotherapy    cyanocobalamin, 1,000 mcg, Intramuscular, Once, 3 of 3 cycles  Administration: 1,000 mcg (6/29/2023), 1,000 mcg (5/18/2023), 1,000 mcg (7/20/2023)  alteplase (CATHFLO), 2 mg, Intracatheter, Every 1 Minute as needed, 4 of 4 cycles  pegfilgrastim (NEULASTA ONPRO), 6 mg, Subcutaneous, Once, 3 of 3 cycles  Administration: 6 mg (6/8/2023), 6 mg (6/29/2023), 6 mg (7/20/2023)  fosaprepitant (EMEND) IVPB, 150 mg, Intravenous, Once, 4 of 4 cycles  Administration: 150 mg (5/18/2023), 150 mg (6/29/2023), 150 mg (7/20/2023), 150 mg (6/8/2023)  nivolumab (OPDIVO) IVPB, 360 mg (150 % of original dose 240 mg), Intravenous, Once, 4 of 4 cycles  Dose modification: 360 mg (original dose 240 mg, Cycle 1, Reason: Dose modified as per discussion with consulting physician)  Administration: 360 mg (5/18/2023), 360 mg (6/8/2023), 360 mg (6/29/2023), 360 mg (7/20/2023)  CARBOplatin (PARAPLATIN) IVPB ([facility] AUC DOSING), 642.5 mg, Intravenous, Once, 4 of 4 cycles  Administration: 642.5 mg (5/18/2023), 642.5 mg (6/29/2023), 566.5 mg (7/20/2023), 650 mg (6/8/2023)  pemetrexed (ALIMTA) chemo infusion, 980 mg, Intravenous, Once, 4 of 4  cycles  Administration: 1,000 mg (5/18/2023), 1,000 mg (6/29/2023), 1,000 mg (7/20/2023), 1,000 mg (6/8/2023)     9/1/2023 -  Cancer Staged    Staging form: Lung, AJCC 8th Edition  - Pathologic stage from 9/1/2023: Stage JAY (pT2b, pN2, cM1b) - Signed by Treva Bah PA-C on 9/20/2023  Histopathologic type: Adenocarcinoma, NOS  Stage prefix: Initial diagnosis  Histologic grade (G): G3  Histologic grading system: 4 grade system       9/1/2023 Surgery    Right robotic converted to open upper lobectomy      9/1/2023 Biopsy    A.  Lung, right upper lobe:     - Invasive poorly differentiated solid adenocarcinoma of the lung, 4.4 cm.     - Tumor invades into, but not through, the visceral pleura (PL1), confirmed by special VVG stains.     - Lymphatic channel invasion by tumor identified.     - Metastatic carcinoma present in three of thirteen lymph nodes (3/13).       -- Rare fibrotic granulomas present.     - All margins are negative for tumor.     - Emphysematous changes.     B.  Lymph node, level 8:     - Negative for malignancy (0/1).     C.  Lymph node, level 7, #1:     - Negative for malignancy (0/1).     D.  Lymph node, level 4R:     - Negative for malignancy (0/1).     E.  Lymph node, level 4R, #2:     - Fibrovascular adipose tissue; negative for malignancy.     - No lymphoid tissue identified.     F.  Lymph node, level 2R, #1:     - Metastatic carcinoma present in one of three lymph nodes (1/3).     G.  Lymph node, level 2R, #2:     - Negative for malignancy (0/1).     H.  Lymph node, level 11 posterior:     - Single lymph node positive for metastatic carcinoma (1/1).     I.  Lymph node, portion of level 12 on artery:     - Negative for malignancy (0/1).     - Non-caseating granulomatous inflammation present.        -- Special stains for acid fast bacilli and fungal organisms are negative.        10/11/2023 - 10/11/2023 Chemotherapy    alteplase (CATHFLO), 2 mg, Intracatheter, Every 1 Minute as needed, 0 of 6  cycles  nivolumab (OPDIVO) IVPB, 240 mg, Intravenous, Once, 0 of 6 cycles     10/13/2023 -  Chemotherapy    alteplase (CATHFLO), 2 mg, Intracatheter, Every 1 Minute as needed, 5 of 8 cycles  nivolumab (OPDIVO) IVPB, 480 mg, Intravenous, Once, 5 of 8 cycles  Administration: 480 mg (10/13/2023), 480 mg (11/10/2023), 480 mg (12/8/2023), 480 mg (1/5/2024), 480 mg (2/23/2024)            Subjective:    Patient ID: Paras Pitts is a 68 y.o. male. ECOG 0    HPI: [place] is s/p robotic converted to right thoracotomy and right upper lobectomy on 9/1/2023 for poorly differentiated adenocarcinoma. Patient underwent neoadjuvant chemotherapy/immunoptherapy and surgical resection of the oligometastasic brain lesion followed by SRS. Following his lung resection he underwent adjuvant immunotherapy.    Patient had surveillance CT 3/5 which was concerning for new lower right paratracheal lymphadenopathy in the mediastinum, measuring 1.9 cm x 2.2 cm. PET CT was obtained 3/25 showing FDG avid mediastinal adenopathy.    He also recently had a brain MRI 3/26 whish showed increased enhancement and vasogenic edema around right frontal resection region. This was discussed at neuro-oncology tumor board where thought was the finding is due to treatment effect/necrosis rather than progression of disease.     Pt follows with Dr. Langford in medical oncology and Dr. Contreras in radiation oncology.    He is currently on steroids for left sided weakness related to treatment. He otherwise feels relatively well.  He reports mild SOB with exertion but does feel this has improved since surgery. He denies cough, hemoptysis, weight loss, n/v, CP      Patient was a previous smoker, quit 30 years ago.    The following portions of the patient's history were reviewed and updated as appropriate: allergies, current medications, past family history, past medical history, past social history, past surgical history, and problem list.    Review of Systems   Constitutional:   "Negative for chills, diaphoresis and unexpected weight change.   HENT: Negative.     Eyes: Negative.    Respiratory:  Positive for shortness of breath. Negative for cough and wheezing.    Cardiovascular:  Negative for chest pain and leg swelling.   Gastrointestinal:  Negative for abdominal pain, diarrhea and nausea.   Endocrine: Negative.    Genitourinary: Negative.    Musculoskeletal: Negative.    Skin: Negative.    Allergic/Immunologic: Negative.    Neurological:  Positive for weakness.        Left-sided weakness   Psychiatric/Behavioral: Negative.     All other systems reviewed and are negative.        Objective:   Physical Exam  Vitals reviewed.   Constitutional:       General: He is not in acute distress.     Appearance: Normal appearance. He is not ill-appearing.   HENT:      Head: Normocephalic.      Nose: Nose normal.      Mouth/Throat:      Mouth: Mucous membranes are moist.   Eyes:      Extraocular Movements: Extraocular movements intact.   Cardiovascular:      Rate and Rhythm: Normal rate and regular rhythm.      Heart sounds: Normal heart sounds.   Pulmonary:      Effort: Pulmonary effort is normal.      Breath sounds: Normal breath sounds. No wheezing, rhonchi or rales.   Abdominal:      Palpations: Abdomen is soft.   Musculoskeletal:         General: No swelling. Normal range of motion.   Skin:     General: Skin is warm.   Neurological:      Mental Status: He is alert and oriented to person, place, and time.   Psychiatric:         Mood and Affect: Mood normal.     /81 (BP Location: Right arm, Patient Position: Sitting, Cuff Size: Standard)   Pulse 71   Temp 98.1 °F (36.7 °C) (Temporal)   Resp 14   Ht 5' 9\" (1.753 m)   Wt 87.9 kg (193 lb 12.6 oz)   SpO2 97%   BMI 28.62 kg/m²     XR chest pa & lateral    Result Date: 9/28/2023  Impression Right pleural thickening versus small right pleural effusion. Workstation performed: [id number]     XR chest pa & lateral    Result Date: " 9/5/2023  Impression Right chest tube removal with no pneumothorax. Mild pleural thickening versus small loculated effusion along the right chest wall. Mild left base atelectasis. Workstation performed: [id number]      No CT Chest results available for this patient.  CT chest abdomen pelvis w contrast    Result Date: 3/5/2024  Narrative CT CHEST, ABDOMEN AND PELVIS WITH IV CONTRAST INDICATION: C34.91: Malignant neoplasm of unspecified part of right bronchus or lung. Previous history of resection of the right upper lobe lung patient on nivolumab COMPARISON: July 18, 2023 TECHNIQUE: CT examination of the chest, abdomen and pelvis was performed. Multiplanar 2D reformatted images were created from the source data. This examination, like all CT scans performed in the [facility], was performed utilizing techniques to minimize radiation dose exposure, including the use of iterative reconstruction and automated exposure control. Radiation dose length product (DLP) for this visit: 910.2 mGy-cm IV Contrast: 100 mL of iohexol (OMNIPAQUE) Enteric Contrast: Not administered. FINDINGS: CHEST LUNGS: Nodular density seen along the left lateral wall of the upper thoracic trachea. This measures 1 cm x 6 mm PLEURA: Unremarkable. HEART/GREAT VESSELS: Heart is unremarkable for patient's age. Ascending aorta measures 4 cm Brachiocephalic, left common carotid artery, subclavian arteries are patent Mild atherosclerotic plaque in the arch of the aorta tortuosity of the descending thoracic aorta seen MEDIASTINUM AND ROSEMARY: There are enlarged lower right paratracheal lymph nodes measuring about 1.9 x 2.2 cm Right-sided line seen with tip in the SVC CHEST WALL AND LOWER NECK: No significant axillary lymph node ABDOMEN LIVER/BILIARY TREE: Hepatic steatosis seen GALLBLADDER: No calcified gallstones. No pericholecystic inflammatory change. SPLEEN: Unremarkable. PANCREAS: Unremarkable. ADRENAL GLANDS: Unremarkable. KIDNEYS/URETERS:  Right renal cyst seen STOMACH AND BOWEL: Unremarkable. APPENDIX: No findings to suggest appendicitis. ABDOMINOPELVIC CAVITY: No ascites. No pneumoperitoneum. Small para-aortic lymph nodes do not meet the criteria for pathologic enlargement on the basis of size VESSELS: Unremarkable for patient's age. PELVIS REPRODUCTIVE ORGANS: Unremarkable for patient's age. URINARY BLADDER: Unremarkable. ABDOMINAL WALL/INGUINAL REGIONS: Umbilical hernia containing fat seen BONES: No lytic lesion Bilateral L5 pars defects with grade 1 anterolisthesis with severe bilateral foraminal narrowing    Impression There is new lower right paratracheal lymphadenopathy in the mediastinum,, measuring 1.9 cm x 2.2 cm. (Level 4R) this may be a neoplastic lymph node or may drug-induced granulomatous reaction, needs further characterization Nodular soft tissue seen along the left lateral upper thoracic trachea, image 25 series 2, indeterminate may be a focal lesion or adherent secretions. Consider follow-up CT at 3 months or characterization with bronchoscopy Postsurgical changes from the resection of the right upper lobe mass The study was marked in EPIC for significant notification. Workstation performed: [id number]      NM PET CT skull base to mid thigh    Result Date: 3/25/2024  Narrative PET/CT SCAN INDICATION: History of resected non-small cell lung cancer. C34.91: Malignant neoplasm of unspecified part of right bronchus or lung C79.9: Secondary malignant neoplasm of unspecified site MODIFIER: PS COMPARISON: PET/CT 7/18/2023, CT chest abdomen pelvis 3/5/2024 CELL TYPE: Metastatic adenocarcinoma TECHNIQUE:   8.0 mCi F-18-FDG administered IV. Multiplanar attenuation corrected and non attenuation corrected PET images are available for interpretation, and contiguous, low dose, axial CT sections were obtained from the skull vertex through the femurs. Intravenous contrast material was not utilized. This examination, like all CT scans performed in  the [facility], was performed utilizing techniques to minimize radiation dose exposure, including the use of iterative reconstruction and automated exposure control. Fasting serum glucose: 109 mg/dl FINDINGS: VISUALIZED BRAIN: Postsurgical changes from previous high right frontoparietal craniotomy. Associated encephalomalacia with relative photopenia on PET. Generalized limited evaluation for the brain parenchyma due to normal physiologic FDG uptake. HEAD/NECK: There is a physiologic distribution of FDG. No FDG avid cervical adenopathy is seen. CT images: Mild mucosal thickening of the bilateral maxillary sinuses. CHEST: FDG avid mediastinal lymphadenopathy. Abnormal lymphadenopathy was noted on recent CT. Right precarinal lymph node demonstrates a SUV max of 8.7. This measures up to 1.8 cm short axis image 112 series 3. No suspicious focal activity in the hilar regions. Postsurgical changes from the right upper lobectomy. No FDG avid pulmonary lesions. CT images: Right-sided Mediport line tip terminates at the SVC. No residual abnormality at the left upper trachea as previously seen likely secretions. Bilateral gynecomastia. ABDOMEN: No FDG avid soft tissue lesions are seen. CT images: Small fat-containing umbilical hernia. Small right renal cyst. PELVIS: No FDG avid soft tissue lesions are seen. CT images: Suggestion of a small fat-containing left inguinal hernia. OSSEOUS STRUCTURES: No FDG avid lesions are seen. CT images: Bilateral L5 pars defects with grade 1 anterolisthesis L5 on S1.     Impression 1. FDG-avid mediastinal lymphadenopathy as seen on recent CT compatible with metastasis. No residual abnormality at the left upper trachea as previously seen likely secretions. 2.  No findings for hypermetabolic metastatic disease to the neck, abdomen and pelvis. Workstation performed: [id number]     NM PET CT skull base to mid thigh    Result Date: 7/18/2023  Narrative PET/CT SCAN INDICATION:  C34.11: Malignant neoplasm of upper lobe, right bronchus or lung, metastatic right non-small cell lung cancer, status post neoadjuvant chemotherapy, resection of right frontal lobe mass. MODIFIER: PS COMPARISON: CTA chest June 27, 2023, MRI brain June 2023, PET/CT April 2023, CT chest abdomen and pelvis March 2023 CELL TYPE: Non-small cell neoplasm TECHNIQUE:   8.1 mCi F-18-FDG administered IV. Multiplanar attenuation corrected and non attenuation corrected PET images are available for interpretation, and contiguous, low dose, axial CT sections were obtained from the vertex through the femurs. Intravenous contrast material was not utilized. This examination, like all CT scans performed in the [facility], was performed utilizing techniques to minimize radiation dose exposure, including the use of iterative reconstruction and automated exposure control. Fasting serum glucose: 108 mg/dl FINDINGS: Mediastinal blood flow SUV max 2 Hepatic parenchyma SUV max 2.7 VISUALIZED BRAIN: Postsurgical changes, status post right frontal craniotomy. HEAD/NECK: There is a physiologic distribution of FDG. No FDG avid cervical adenopathy is seen. CT images: Unremarkable. CHEST: Hypermetabolic mass is again demonstrated in the right upper lobe with SUV max of 19 compared to 20 on the previous exam image 107. Right upper paratracheal node is nonspecific with SUV max of 1.9. Size is unchanged. There is interval resolution of hypermetabolic right hilar adenopathy. CT images: Right portacatheter. Right upper lobe mass measures 3.5 x 3.8 cm series 3/107, compared to 4 x 3.9 cm. Right paratracheal nodes, measuring 8 to 10 mm are unchanged and were not showing definite increased metabolic activity. Right hilar adenopathy cannot be assessed on unenhanced exam. Coronary artery calcifications. Gynecomastia. ABDOMEN: No FDG avid soft tissue lesions are seen. CT images: Atherosclerotic disease of the abdominal aorta without  aneurysmal dilatation. Fat-containing umbilical hernia. Fat-containing left inguinal hernia. PELVIS: No FDG avid soft tissue lesions are seen. CT images: Unremarkable. OSSEOUS STRUCTURES: No FDG avid lesions are seen. 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 Grade 1 anterolisthesis of L5 on S1.     Impression 1. Interval resolution of metabolic activity in association with right hilar nodes with slightly decrease in size of right upper lobe mass. 2. No evidence for metastatic disease to the abdomen or pelvis. 3. Small right paratracheal nodes with very minimal activity which is not diagnostic for malignancy and unchanged. Workstation performed: [id number]     NM PET CT skull base to mid thigh    Result Date: 4/28/2023  Narrative PET/CT SCAN INDICATION: C79.9: Secondary malignant neoplasm of unspecified site Newly diagnosed metastatic adenocarcinoma of the lung. Evaluate disease extent. MODIFIER: PS COMPARISON: No prior PET scans. Prior CT of chest abdomen and pelvis, 3/20/2023 CELL TYPE: Metastatic non-small cell carcinoma, diagnosed from resection of right frontal brain mass TECHNIQUE:   8.6 mCi F-18-FDG administered IV. Multiplanar attenuation corrected and non attenuation corrected PET images are available for interpretation, and contiguous, low dose, axial CT sections were obtained from the skull base through the femurs. Intravenous contrast material was not utilized. This examination, like all CT scans performed in the [facility], was performed utilizing techniques to minimize radiation dose exposure, including the use of iterative reconstruction and automated exposure control. Fasting serum glucose: 94 mg/dl FINDINGS: VISUALIZED BRAIN: Postsurgical changes, right frontal lobe. Please refer to prior MRI reports HEAD/NECK: There is a physiologic distribution of FDG. No FDG avid cervical adenopathy is seen. CT images: Unremarkable. CHEST: -Right upper  lobe lobular marginated 3.6 x 3.4 cm mass touching the right lateral pleural surface, SUV max 20.0. - Multifocal FDG avid right hilar adenopathy, SUV max 6.6 - Trace nonspecific activity within a right paratracheal lymph node which measures 0.9 cm in short axis dimension, SUV max 2.0. Otherwise no evidence of FDG avid mediastinal adenopathy CT images: Wuhxid-s-Nupv catheter is present. No pleural or pericardial effusion. There is coronary artery calcification ABDOMEN: No FDG avid soft tissue lesions are seen. CT images: Shotty retroperitoneal lymph nodes demonstrate no abnormal activity. There is no ascites or hydronephrosis. There is an umbilical adipose containing hernia PELVIS: No FDG avid soft tissue lesions are seen. CT images: Small left inguinal adipose containing hernia. OSSEOUS STRUCTURES: No FDG avid lesions are seen. CT images: Postsurgical changes right frontal bone. Grade 1 anterolisthesis and degenerative disc disease at L5-S1. Than     Impression 1. FDG avid right upper lobe mass in keeping with primary bronchogenic carcinoma of the lung 2. FDG avid multifocal right hilar adenopathy 3. Trace nonspecific activity within a right paratracheal lymph node with short axis diameter 0.9 cm. 4. No evidence of FDG avid metastatic disease within the neck, abdomen, pelvis, or skeleton Workstation performed: [id number]      No Barium Swallow results available for this patient.

## 2024-04-10 NOTE — LETTER
April 10, 2024     James Contreras MD  [address] [place] [id number]    Patient: Paras Pitts   YOB: 1955   Date of Visit: 4/10/2024       Dear Dr. Contreras:    Please see below regarding mutual pt.  Below are my notes for this consultation.    If you have questions, please do not hesitate to call me. I look forward to following your patient along with you.         Sincerely,        Kalia Sparrow MD        CC: No Recipients    Amylyn Jena Mortimer, PA-C  4/10/2024  4:53 PM  Sign when Signing Visit  Thoracic Follow-Up  Assessment/Plan:    Carcinoma of right lung (HCC)  68 y.o. male s/p robotic converted to right thoracotomy and right upper lobectomy on 9/1/2023 for poorly differentiated adenocarcinoma. Patient underwent neoadjuvant chemotherapy/immunoptherapy and surgical resection of the oligometastasic brain lesion followed by SRS. Following his lung resection he underwent adjuvant immunotherapy. Recent CT and PET scan are concerning for metastatic disease. Dr. Sparrow reviewed patient's imaging and recommends tissue biopsy of the concerning right paratracheal lymph node. Recommend proceeding with bronchoscopy, EBUS with tissue biopsy 4/16. We discussed risks of surgery in detail including risks involved with anesthesia, bleeding, infection. Patient understood and wishes to proceed with surgery. All questions answered. Consent signed.        Diagnoses and all orders for this visit:    Carcinoma of right lung (HCC)  -     Case request operating room: ENDOBRONCHIAL ULTRASOUND (EBUS), BRONCHOSCOPY FLEXIBLE; Standing  -     Case request operating room: ENDOBRONCHIAL ULTRASOUND (EBUS), BRONCHOSCOPY FLEXIBLE    Other orders  -     hydroCHLOROthiazide 25 mg tablet; Take 25 mg by mouth daily  -     Diet NPO; Sips with meds; Standing  -     Nursing communication Please give pre-op Carbohydrate drink to patient 2-4 hours prior to surgery; Standing  -     Void on call to OR; Standing  -      Insert peripheral IV; Standing  -     Place sequential compression device; Standing  -     heparin (porcine) subcutaneous injection 5,000 Units          Thoracic History       Cancer Staging   Carcinoma of right lung (HCC)  Staging form: Lung, AJCC 8th Edition  - Clinical: Stage JAY (cT3, cN1, cM1b) - Signed by James Contreras MD on 4/13/2023  - Pathologic stage from 9/1/2023: Stage JAY (pT2b, pN2, cM1b) - Signed by Treva Bah PA-C on 9/20/2023  Histopathologic type: Adenocarcinoma, NOS  Stage prefix: Initial diagnosis  Histologic grade (G): G3  Histologic grading system: 4 grade system  Oncology History   Metastatic adenocarcinoma (HCC)   2023 Initial Diagnosis    Metastatic adenocarcinoma (HCC)     3/23/2023 Biopsy    Brain, right frontal mass (biopsy):  - Metastatic non-small cell carcinoma     Comment:  - Tumor cells stain diffusely for CAM5.2, CKAE1/3, CK7, TTF1 and NapsinA with absent CK20, p63, CK5/6, p40 expression.  This immunopanel favors a metastatic lung adenocarcinoma.       5/18/2023 - 7/20/2023 Chemotherapy    cyanocobalamin, 1,000 mcg, Intramuscular, Once, 3 of 3 cycles  Administration: 1,000 mcg (6/29/2023), 1,000 mcg (5/18/2023), 1,000 mcg (7/20/2023)  alteplase (CATHFLO), 2 mg, Intracatheter, Every 1 Minute as needed, 4 of 4 cycles  pegfilgrastim (NEULASTA ONPRO), 6 mg, Subcutaneous, Once, 3 of 3 cycles  Administration: 6 mg (6/8/2023), 6 mg (6/29/2023), 6 mg (7/20/2023)  fosaprepitant (EMEND) IVPB, 150 mg, Intravenous, Once, 4 of 4 cycles  Administration: 150 mg (5/18/2023), 150 mg (6/29/2023), 150 mg (7/20/2023), 150 mg (6/8/2023)  nivolumab (OPDIVO) IVPB, 360 mg (150 % of original dose 240 mg), Intravenous, Once, 4 of 4 cycles  Dose modification: 360 mg (original dose 240 mg, Cycle 1, Reason: Dose modified as per discussion with consulting physician)  Administration: 360 mg (5/18/2023), 360 mg (6/8/2023), 360 mg (6/29/2023), 360 mg (7/20/2023)  CARBOplatin (PARAPLATIN) IVPB ([facility] AUC  DOSING), 642.5 mg, Intravenous, Once, 4 of 4 cycles  Administration: 642.5 mg (5/18/2023), 642.5 mg (6/29/2023), 566.5 mg (7/20/2023), 650 mg (6/8/2023)  pemetrexed (ALIMTA) chemo infusion, 980 mg, Intravenous, Once, 4 of 4 cycles  Administration: 1,000 mg (5/18/2023), 1,000 mg (6/29/2023), 1,000 mg (7/20/2023), 1,000 mg (6/8/2023)     10/11/2023 - 10/11/2023 Chemotherapy    alteplase (CATHFLO), 2 mg, Intracatheter, Every 1 Minute as needed, 0 of 6 cycles  nivolumab (OPDIVO) IVPB, 240 mg, Intravenous, Once, 0 of 6 cycles     10/13/2023 -  Chemotherapy    alteplase (CATHFLO), 2 mg, Intracatheter, Every 1 Minute as needed, 5 of 8 cycles  nivolumab (OPDIVO) IVPB, 480 mg, Intravenous, Once, 5 of 8 cycles  Administration: 480 mg (10/13/2023), 480 mg (11/10/2023), 480 mg (12/8/2023), 480 mg (1/5/2024), 480 mg (2/23/2024)     Carcinoma of right lung (HCC)   4/13/2023 Initial Diagnosis    Carcinoma of right lung (HCC)     4/13/2023 -  Cancer Staged    Staging form: Lung, AJCC 8th Edition  - Clinical: Stage JAY (cT3, cN1, cM1b) - Signed by James Contreras MD on 4/13/2023 4/19/2023 - 4/28/2023 Radiation    Plan ID Energy Fractions Dose per Fraction (cGy) Dose Correction (cGy) Total Dose Delivered (cGy) Elapsed Days   SRT R Frontal 6X-FFF 5 / 5 600 0 3,000 9         5/18/2023 - 7/20/2023 Chemotherapy    cyanocobalamin, 1,000 mcg, Intramuscular, Once, 3 of 3 cycles  Administration: 1,000 mcg (6/29/2023), 1,000 mcg (5/18/2023), 1,000 mcg (7/20/2023)  alteplase (CATHFLO), 2 mg, Intracatheter, Every 1 Minute as needed, 4 of 4 cycles  pegfilgrastim (NEULASTA ONPRO), 6 mg, Subcutaneous, Once, 3 of 3 cycles  Administration: 6 mg (6/8/2023), 6 mg (6/29/2023), 6 mg (7/20/2023)  fosaprepitant (EMEND) IVPB, 150 mg, Intravenous, Once, 4 of 4 cycles  Administration: 150 mg (5/18/2023), 150 mg (6/29/2023), 150 mg (7/20/2023), 150 mg (6/8/2023)  nivolumab (OPDIVO) IVPB, 360 mg (150 % of original dose 240 mg), Intravenous, Once, 4  of 4 cycles  Dose modification: 360 mg (original dose 240 mg, Cycle 1, Reason: Dose modified as per discussion with consulting physician)  Administration: 360 mg (5/18/2023), 360 mg (6/8/2023), 360 mg (6/29/2023), 360 mg (7/20/2023)  CARBOplatin (PARAPLATIN) IVPB ([facility] AUC DOSING), 642.5 mg, Intravenous, Once, 4 of 4 cycles  Administration: 642.5 mg (5/18/2023), 642.5 mg (6/29/2023), 566.5 mg (7/20/2023), 650 mg (6/8/2023)  pemetrexed (ALIMTA) chemo infusion, 980 mg, Intravenous, Once, 4 of 4 cycles  Administration: 1,000 mg (5/18/2023), 1,000 mg (6/29/2023), 1,000 mg (7/20/2023), 1,000 mg (6/8/2023)     9/1/2023 -  Cancer Staged    Staging form: Lung, AJCC 8th Edition  - Pathologic stage from 9/1/2023: Stage JAY (pT2b, pN2, cM1b) - Signed by Treva Bah PA-C on 9/20/2023  Histopathologic type: Adenocarcinoma, NOS  Stage prefix: Initial diagnosis  Histologic grade (G): G3  Histologic grading system: 4 grade system       9/1/2023 Surgery    Right robotic converted to open upper lobectomy      9/1/2023 Biopsy    A.  Lung, right upper lobe:     - Invasive poorly differentiated solid adenocarcinoma of the lung, 4.4 cm.     - Tumor invades into, but not through, the visceral pleura (PL1), confirmed by special VVG stains.     - Lymphatic channel invasion by tumor identified.     - Metastatic carcinoma present in three of thirteen lymph nodes (3/13).       -- Rare fibrotic granulomas present.     - All margins are negative for tumor.     - Emphysematous changes.     B.  Lymph node, level 8:     - Negative for malignancy (0/1).     C.  Lymph node, level 7, #1:     - Negative for malignancy (0/1).     D.  Lymph node, level 4R:     - Negative for malignancy (0/1).     E.  Lymph node, level 4R, #2:     - Fibrovascular adipose tissue; negative for malignancy.     - No lymphoid tissue identified.     F.  Lymph node, level 2R, #1:     - Metastatic carcinoma present in one of three lymph nodes (1/3).     G.  Lymph node, level  2R, #2:     - Negative for malignancy (0/1).     H.  Lymph node, level 11 posterior:     - Single lymph node positive for metastatic carcinoma (1/1).     I.  Lymph node, portion of level 12 on artery:     - Negative for malignancy (0/1).     - Non-caseating granulomatous inflammation present.        -- Special stains for acid fast bacilli and fungal organisms are negative.        10/11/2023 - 10/11/2023 Chemotherapy    alteplase (CATHFLO), 2 mg, Intracatheter, Every 1 Minute as needed, 0 of 6 cycles  nivolumab (OPDIVO) IVPB, 240 mg, Intravenous, Once, 0 of 6 cycles     10/13/2023 -  Chemotherapy    alteplase (CATHFLO), 2 mg, Intracatheter, Every 1 Minute as needed, 5 of 8 cycles  nivolumab (OPDIVO) IVPB, 480 mg, Intravenous, Once, 5 of 8 cycles  Administration: 480 mg (10/13/2023), 480 mg (11/10/2023), 480 mg (12/8/2023), 480 mg (1/5/2024), 480 mg (2/23/2024)            Subjective:    Patient ID: Paras Pitts is a 68 y.o. male. ECOG 0    HPI: [place] is s/p robotic converted to right thoracotomy and right upper lobectomy on 9/1/2023 for poorly differentiated adenocarcinoma. Patient underwent neoadjuvant chemotherapy/immunoptherapy and surgical resection of the oligometastasic brain lesion followed by SRS. Following his lung resection he underwent adjuvant immunotherapy.    Patient had surveillance CT 3/5 which was concerning for new lower right paratracheal lymphadenopathy in the mediastinum, measuring 1.9 cm x 2.2 cm. PET CT was obtained 3/25 showing FDG avid mediastinal adenopathy.    He also recently had a brain MRI 3/26 whish showed increased enhancement and vasogenic edema around right frontal resection region. This was discussed at neuro-oncology tumor board where thought was the finding is due to treatment effect/necrosis rather than progression of disease.     Pt follows with Dr. Langford in medical oncology and Dr. Contreras in radiation oncology.    He is currently on steroids for left sided weakness related to  treatment. He otherwise feels relatively well.  He reports mild SOB with exertion but does feel this has improved since surgery. He denies cough, hemoptysis, weight loss, n/v, CP      Patient was a previous smoker, quit 30 years ago.    The following portions of the patient's history were reviewed and updated as appropriate: allergies, current medications, past family history, past medical history, past social history, past surgical history, and problem list.    Review of Systems   Constitutional:  Negative for chills, diaphoresis and unexpected weight change.   HENT: Negative.     Eyes: Negative.    Respiratory:  Positive for shortness of breath. Negative for cough and wheezing.    Cardiovascular:  Negative for chest pain and leg swelling.   Gastrointestinal:  Negative for abdominal pain, diarrhea and nausea.   Endocrine: Negative.    Genitourinary: Negative.    Musculoskeletal: Negative.    Skin: Negative.    Allergic/Immunologic: Negative.    Neurological:  Positive for weakness.        Left-sided weakness   Psychiatric/Behavioral: Negative.     All other systems reviewed and are negative.        Objective:   Physical Exam  Vitals reviewed.   Constitutional:       General: He is not in acute distress.     Appearance: Normal appearance. He is not ill-appearing.   HENT:      Head: Normocephalic.      Nose: Nose normal.      Mouth/Throat:      Mouth: Mucous membranes are moist.   Eyes:      Extraocular Movements: Extraocular movements intact.   Cardiovascular:      Rate and Rhythm: Normal rate and regular rhythm.      Heart sounds: Normal heart sounds.   Pulmonary:      Effort: Pulmonary effort is normal.      Breath sounds: Normal breath sounds. No wheezing, rhonchi or rales.   Abdominal:      Palpations: Abdomen is soft.   Musculoskeletal:         General: No swelling. Normal range of motion.   Skin:     General: Skin is warm.   Neurological:      Mental Status: He is alert and oriented to person, place, and time.  "  Psychiatric:         Mood and Affect: Mood normal.     /81 (BP Location: Right arm, Patient Position: Sitting, Cuff Size: Standard)   Pulse 71   Temp 98.1 °F (36.7 °C) (Temporal)   Resp 14   Ht 5' 9\" (1.753 m)   Wt 87.9 kg (193 lb 12.6 oz)   SpO2 97%   BMI 28.62 kg/m²     XR chest pa & lateral    Result Date: 9/28/2023  Impression Right pleural thickening versus small right pleural effusion. Workstation performed: [id number]     XR chest pa & lateral    Result Date: 9/5/2023  Impression Right chest tube removal with no pneumothorax. Mild pleural thickening versus small loculated effusion along the right chest wall. Mild left base atelectasis. Workstation performed: [id number]      No CT Chest results available for this patient.  CT chest abdomen pelvis w contrast    Result Date: 3/5/2024  Narrative CT CHEST, ABDOMEN AND PELVIS WITH IV CONTRAST INDICATION: C34.91: Malignant neoplasm of unspecified part of right bronchus or lung. Previous history of resection of the right upper lobe lung patient on nivolumab COMPARISON: July 18, 2023 TECHNIQUE: CT examination of the chest, abdomen and pelvis was performed. Multiplanar 2D reformatted images were created from the source data. This examination, like all CT scans performed in the [facility], was performed utilizing techniques to minimize radiation dose exposure, including the use of iterative reconstruction and automated exposure control. Radiation dose length product (DLP) for this visit: 910.2 mGy-cm IV Contrast: 100 mL of iohexol (OMNIPAQUE) Enteric Contrast: Not administered. FINDINGS: CHEST LUNGS: Nodular density seen along the left lateral wall of the upper thoracic trachea. This measures 1 cm x 6 mm PLEURA: Unremarkable. HEART/GREAT VESSELS: Heart is unremarkable for patient's age. Ascending aorta measures 4 cm Brachiocephalic, left common carotid artery, subclavian arteries are patent Mild atherosclerotic plaque in the arch of " the aorta tortuosity of the descending thoracic aorta seen MEDIASTINUM AND ROSEMARY: There are enlarged lower right paratracheal lymph nodes measuring about 1.9 x 2.2 cm Right-sided line seen with tip in the SVC CHEST WALL AND LOWER NECK: No significant axillary lymph node ABDOMEN LIVER/BILIARY TREE: Hepatic steatosis seen GALLBLADDER: No calcified gallstones. No pericholecystic inflammatory change. SPLEEN: Unremarkable. PANCREAS: Unremarkable. ADRENAL GLANDS: Unremarkable. KIDNEYS/URETERS: Right renal cyst seen STOMACH AND BOWEL: Unremarkable. APPENDIX: No findings to suggest appendicitis. ABDOMINOPELVIC CAVITY: No ascites. No pneumoperitoneum. Small para-aortic lymph nodes do not meet the criteria for pathologic enlargement on the basis of size VESSELS: Unremarkable for patient's age. PELVIS REPRODUCTIVE ORGANS: Unremarkable for patient's age. URINARY BLADDER: Unremarkable. ABDOMINAL WALL/INGUINAL REGIONS: Umbilical hernia containing fat seen BONES: No lytic lesion Bilateral L5 pars defects with grade 1 anterolisthesis with severe bilateral foraminal narrowing    Impression There is new lower right paratracheal lymphadenopathy in the mediastinum,, measuring 1.9 cm x 2.2 cm. (Level 4R) this may be a neoplastic lymph node or may drug-induced granulomatous reaction, needs further characterization Nodular soft tissue seen along the left lateral upper thoracic trachea, image 25 series 2, indeterminate may be a focal lesion or adherent secretions. Consider follow-up CT at 3 months or characterization with bronchoscopy Postsurgical changes from the resection of the right upper lobe mass The study was marked in EPIC for significant notification. Workstation performed: [id number]      NM PET CT skull base to mid thigh    Result Date: 3/25/2024  Narrative PET/CT SCAN INDICATION: History of resected non-small cell lung cancer. C34.91: Malignant neoplasm of unspecified part of right bronchus or lung C79.9: Secondary malignant  neoplasm of unspecified site MODIFIER: PS COMPARISON: PET/CT 7/18/2023, CT chest abdomen pelvis 3/5/2024 CELL TYPE: Metastatic adenocarcinoma TECHNIQUE:   8.0 mCi F-18-FDG administered IV. Multiplanar attenuation corrected and non attenuation corrected PET images are available for interpretation, and contiguous, low dose, axial CT sections were obtained from the skull vertex through the femurs. Intravenous contrast material was not utilized. This examination, like all CT scans performed in the [facility], was performed utilizing techniques to minimize radiation dose exposure, including the use of iterative reconstruction and automated exposure control. Fasting serum glucose: 109 mg/dl FINDINGS: VISUALIZED BRAIN: Postsurgical changes from previous high right frontoparietal craniotomy. Associated encephalomalacia with relative photopenia on PET. Generalized limited evaluation for the brain parenchyma due to normal physiologic FDG uptake. HEAD/NECK: There is a physiologic distribution of FDG. No FDG avid cervical adenopathy is seen. CT images: Mild mucosal thickening of the bilateral maxillary sinuses. CHEST: FDG avid mediastinal lymphadenopathy. Abnormal lymphadenopathy was noted on recent CT. Right precarinal lymph node demonstrates a SUV max of 8.7. This measures up to 1.8 cm short axis image 112 series 3. No suspicious focal activity in the hilar regions. Postsurgical changes from the right upper lobectomy. No FDG avid pulmonary lesions. CT images: Right-sided Mediport line tip terminates at the SVC. No residual abnormality at the left upper trachea as previously seen likely secretions. Bilateral gynecomastia. ABDOMEN: No FDG avid soft tissue lesions are seen. CT images: Small fat-containing umbilical hernia. Small right renal cyst. PELVIS: No FDG avid soft tissue lesions are seen. CT images: Suggestion of a small fat-containing left inguinal hernia. OSSEOUS STRUCTURES: No FDG avid lesions are  seen. CT images: Bilateral L5 pars defects with grade 1 anterolisthesis L5 on S1.     Impression 1. FDG-avid mediastinal lymphadenopathy as seen on recent CT compatible with metastasis. No residual abnormality at the left upper trachea as previously seen likely secretions. 2.  No findings for hypermetabolic metastatic disease to the neck, abdomen and pelvis. Workstation performed: [id number]     NM PET CT skull base to mid thigh    Result Date: 7/18/2023  Narrative PET/CT SCAN INDICATION: C34.11: Malignant neoplasm of upper lobe, right bronchus or lung, metastatic right non-small cell lung cancer, status post neoadjuvant chemotherapy, resection of right frontal lobe mass. MODIFIER: PS COMPARISON: CTA chest June 27, 2023, MRI brain June 2023, PET/CT April 2023, CT chest abdomen and pelvis March 2023 CELL TYPE: Non-small cell neoplasm TECHNIQUE:   8.1 mCi F-18-FDG administered IV. Multiplanar attenuation corrected and non attenuation corrected PET images are available for interpretation, and contiguous, low dose, axial CT sections were obtained from the vertex through the femurs. Intravenous contrast material was not utilized. This examination, like all CT scans performed in the [facility], was performed utilizing techniques to minimize radiation dose exposure, including the use of iterative reconstruction and automated exposure control. Fasting serum glucose: 108 mg/dl FINDINGS: Mediastinal blood flow SUV max 2 Hepatic parenchyma SUV max 2.7 VISUALIZED BRAIN: Postsurgical changes, status post right frontal craniotomy. HEAD/NECK: There is a physiologic distribution of FDG. No FDG avid cervical adenopathy is seen. CT images: Unremarkable. CHEST: Hypermetabolic mass is again demonstrated in the right upper lobe with SUV max of 19 compared to 20 on the previous exam image 107. Right upper paratracheal node is nonspecific with SUV max of 1.9. Size is unchanged. There is interval resolution of  hypermetabolic right hilar adenopathy. CT images: Right portacatheter. Right upper lobe mass measures 3.5 x 3.8 cm series 3/107, compared to 4 x 3.9 cm. Right paratracheal nodes, measuring 8 to 10 mm are unchanged and were not showing definite increased metabolic activity. Right hilar adenopathy cannot be assessed on unenhanced exam. Coronary artery calcifications. Gynecomastia. ABDOMEN: No FDG avid soft tissue lesions are seen. CT images: Atherosclerotic disease of the abdominal aorta without aneurysmal dilatation. Fat-containing umbilical hernia. Fat-containing left inguinal hernia. PELVIS: No FDG avid soft tissue lesions are seen. CT images: Unremarkable. OSSEOUS STRUCTURES: No FDG avid lesions are seen. 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 Grade 1 anterolisthesis of L5 on S1.     Impression 1. Interval resolution of metabolic activity in association with right hilar nodes with slightly decrease in size of right upper lobe mass. 2. No evidence for metastatic disease to the abdomen or pelvis. 3. Small right paratracheal nodes with very minimal activity which is not diagnostic for malignancy and unchanged. Workstation performed: [id number]     NM PET CT skull base to mid thigh    Result Date: 4/28/2023  Narrative PET/CT SCAN INDICATION: C79.9: Secondary malignant neoplasm of unspecified site Newly diagnosed metastatic adenocarcinoma of the lung. Evaluate disease extent. MODIFIER: PS COMPARISON: No prior PET scans. Prior CT of chest abdomen and pelvis, 3/20/2023 CELL TYPE: Metastatic non-small cell carcinoma, diagnosed from resection of right frontal brain mass TECHNIQUE:   8.6 mCi F-18-FDG administered IV. Multiplanar attenuation corrected and non attenuation corrected PET images are available for interpretation, and contiguous, low dose, axial CT sections were obtained from the skull base through the femurs. Intravenous contrast material was not utilized. This  examination, like all CT scans performed in the [facility], was performed utilizing techniques to minimize radiation dose exposure, including the use of iterative reconstruction and automated exposure control. Fasting serum glucose: 94 mg/dl FINDINGS: VISUALIZED BRAIN: Postsurgical changes, right frontal lobe. Please refer to prior MRI reports HEAD/NECK: There is a physiologic distribution of FDG. No FDG avid cervical adenopathy is seen. CT images: Unremarkable. CHEST: -Right upper lobe lobular marginated 3.6 x 3.4 cm mass touching the right lateral pleural surface, SUV max 20.0. - Multifocal FDG avid right hilar adenopathy, SUV max 6.6 - Trace nonspecific activity within a right paratracheal lymph node which measures 0.9 cm in short axis dimension, SUV max 2.0. Otherwise no evidence of FDG avid mediastinal adenopathy CT images: Uenlll-f-Naix catheter is present. No pleural or pericardial effusion. There is coronary artery calcification ABDOMEN: No FDG avid soft tissue lesions are seen. CT images: Shotty retroperitoneal lymph nodes demonstrate no abnormal activity. There is no ascites or hydronephrosis. There is an umbilical adipose containing hernia PELVIS: No FDG avid soft tissue lesions are seen. CT images: Small left inguinal adipose containing hernia. OSSEOUS STRUCTURES: No FDG avid lesions are seen. CT images: Postsurgical changes right frontal bone. Grade 1 anterolisthesis and degenerative disc disease at L5-S1. Than     Impression 1. FDG avid right upper lobe mass in keeping with primary bronchogenic carcinoma of the lung 2. FDG avid multifocal right hilar adenopathy 3. Trace nonspecific activity within a right paratracheal lymph node with short axis diameter 0.9 cm. 4. No evidence of FDG avid metastatic disease within the neck, abdomen, pelvis, or skeleton Workstation performed: [id number]      No Barium Swallow results available for this patient.

## 2024-04-12 ENCOUNTER — ANESTHESIA EVENT (OUTPATIENT)
Dept: PERIOP | Facility: HOSPITAL | Age: 69
End: 2024-04-12
Payer: COMMERCIAL

## 2024-04-16 ENCOUNTER — ANESTHESIA (OUTPATIENT)
Dept: PERIOP | Facility: HOSPITAL | Age: 69
End: 2024-04-16
Payer: COMMERCIAL

## 2024-04-16 ENCOUNTER — HOSPITAL ENCOUNTER (OUTPATIENT)
Facility: HOSPITAL | Age: 69
Setting detail: OUTPATIENT SURGERY
Discharge: HOME | End: 2024-04-16
Attending: THORACIC SURGERY (CARDIOTHORACIC VASCULAR SURGERY) | Admitting: THORACIC SURGERY (CARDIOTHORACIC VASCULAR SURGERY)
Payer: COMMERCIAL

## 2024-04-16 VITALS
HEART RATE: 63 BPM | HEIGHT: 69 IN | SYSTOLIC BLOOD PRESSURE: 149 MMHG | RESPIRATION RATE: 16 BRPM | TEMPERATURE: 97.1 F | OXYGEN SATURATION: 98 % | DIASTOLIC BLOOD PRESSURE: 83 MMHG | WEIGHT: 195 LBS | BODY MASS INDEX: 28.88 KG/M2

## 2024-04-16 DIAGNOSIS — C34.91 CARCINOMA OF RIGHT LUNG (HCC): ICD-10-CM

## 2024-04-16 PROCEDURE — 88173 CYTOPATH EVAL FNA REPORT: CPT | Performed by: STUDENT IN AN ORGANIZED HEALTH CARE EDUCATION/TRAINING PROGRAM

## 2024-04-16 PROCEDURE — 88305 TISSUE EXAM BY PATHOLOGIST: CPT | Performed by: STUDENT IN AN ORGANIZED HEALTH CARE EDUCATION/TRAINING PROGRAM

## 2024-04-16 PROCEDURE — 31653 BRONCH EBUS SAMPLNG 3/> NODE: CPT | Performed by: THORACIC SURGERY (CARDIOTHORACIC VASCULAR SURGERY)

## 2024-04-16 PROCEDURE — 88341 IMHCHEM/IMCYTCHM EA ADD ANTB: CPT | Performed by: STUDENT IN AN ORGANIZED HEALTH CARE EDUCATION/TRAINING PROGRAM

## 2024-04-16 PROCEDURE — 88342 IMHCHEM/IMCYTCHM 1ST ANTB: CPT | Performed by: STUDENT IN AN ORGANIZED HEALTH CARE EDUCATION/TRAINING PROGRAM

## 2024-04-16 PROCEDURE — 88172 CYTP DX EVAL FNA 1ST EA SITE: CPT | Performed by: STUDENT IN AN ORGANIZED HEALTH CARE EDUCATION/TRAINING PROGRAM

## 2024-04-16 RX ORDER — FENTANYL CITRATE 50 UG/ML
INJECTION, SOLUTION INTRAMUSCULAR; INTRAVENOUS AS NEEDED
Status: DISCONTINUED | OUTPATIENT
Start: 2024-04-16 | End: 2024-04-16

## 2024-04-16 RX ORDER — ONDANSETRON 2 MG/ML
4 INJECTION INTRAMUSCULAR; INTRAVENOUS ONCE AS NEEDED
Status: DISCONTINUED | OUTPATIENT
Start: 2024-04-16 | End: 2024-04-16 | Stop reason: HOSPADM

## 2024-04-16 RX ORDER — FENTANYL CITRATE/PF 50 MCG/ML
12.5 SYRINGE (ML) INJECTION
Status: DISCONTINUED | OUTPATIENT
Start: 2024-04-16 | End: 2024-04-16 | Stop reason: HOSPADM

## 2024-04-16 RX ORDER — LIDOCAINE HYDROCHLORIDE 10 MG/ML
INJECTION, SOLUTION EPIDURAL; INFILTRATION; INTRACAUDAL; PERINEURAL AS NEEDED
Status: DISCONTINUED | OUTPATIENT
Start: 2024-04-16 | End: 2024-04-16

## 2024-04-16 RX ORDER — MAGNESIUM HYDROXIDE 1200 MG/15ML
LIQUID ORAL AS NEEDED
Status: DISCONTINUED | OUTPATIENT
Start: 2024-04-16 | End: 2024-04-16 | Stop reason: HOSPADM

## 2024-04-16 RX ORDER — HEPARIN SODIUM 5000 [USP'U]/ML
5000 INJECTION, SOLUTION INTRAVENOUS; SUBCUTANEOUS
Status: COMPLETED | OUTPATIENT
Start: 2024-04-16 | End: 2024-04-16

## 2024-04-16 RX ORDER — ONDANSETRON 2 MG/ML
INJECTION INTRAMUSCULAR; INTRAVENOUS AS NEEDED
Status: DISCONTINUED | OUTPATIENT
Start: 2024-04-16 | End: 2024-04-16

## 2024-04-16 RX ORDER — LIDOCAINE HYDROCHLORIDE 20 MG/ML
INJECTION, SOLUTION EPIDURAL; INFILTRATION; INTRACAUDAL; PERINEURAL AS NEEDED
Status: DISCONTINUED | OUTPATIENT
Start: 2024-04-16 | End: 2024-04-16 | Stop reason: HOSPADM

## 2024-04-16 RX ORDER — PROPOFOL 10 MG/ML
INJECTION, EMULSION INTRAVENOUS AS NEEDED
Status: DISCONTINUED | OUTPATIENT
Start: 2024-04-16 | End: 2024-04-16

## 2024-04-16 RX ORDER — SODIUM CHLORIDE, SODIUM LACTATE, POTASSIUM CHLORIDE, CALCIUM CHLORIDE 600; 310; 30; 20 MG/100ML; MG/100ML; MG/100ML; MG/100ML
20 INJECTION, SOLUTION INTRAVENOUS CONTINUOUS
Status: DISCONTINUED | OUTPATIENT
Start: 2024-04-16 | End: 2024-04-16 | Stop reason: HOSPADM

## 2024-04-16 RX ORDER — PROPOFOL 10 MG/ML
INJECTION, EMULSION INTRAVENOUS CONTINUOUS PRN
Status: DISCONTINUED | OUTPATIENT
Start: 2024-04-16 | End: 2024-04-16

## 2024-04-16 RX ORDER — HYDROMORPHONE HCL IN WATER/PF 6 MG/30 ML
0.2 PATIENT CONTROLLED ANALGESIA SYRINGE INTRAVENOUS
Status: DISCONTINUED | OUTPATIENT
Start: 2024-04-16 | End: 2024-04-16 | Stop reason: HOSPADM

## 2024-04-16 RX ADMIN — SODIUM CHLORIDE 0.2 MCG/KG/MIN: 900 INJECTION INTRAVENOUS at 13:11

## 2024-04-16 RX ADMIN — SODIUM CHLORIDE, SODIUM LACTATE, POTASSIUM CHLORIDE, AND CALCIUM CHLORIDE 20 ML/HR: .6; .31; .03; .02 INJECTION, SOLUTION INTRAVENOUS at 12:18

## 2024-04-16 RX ADMIN — PROPOFOL 50 MG: 10 INJECTION, EMULSION INTRAVENOUS at 13:09

## 2024-04-16 RX ADMIN — LIDOCAINE HYDROCHLORIDE 50 MG: 10 INJECTION, SOLUTION EPIDURAL; INFILTRATION; INTRACAUDAL; PERINEURAL at 13:04

## 2024-04-16 RX ADMIN — FENTANYL CITRATE 50 MCG: 50 INJECTION INTRAMUSCULAR; INTRAVENOUS at 13:09

## 2024-04-16 RX ADMIN — PROPOFOL 150 MG: 10 INJECTION, EMULSION INTRAVENOUS at 13:04

## 2024-04-16 RX ADMIN — HEPARIN SODIUM 5000 UNITS: 5000 INJECTION INTRAVENOUS; SUBCUTANEOUS at 12:44

## 2024-04-16 RX ADMIN — ONDANSETRON 4 MG: 2 INJECTION INTRAMUSCULAR; INTRAVENOUS at 13:11

## 2024-04-16 RX ADMIN — PROPOFOL 120 MCG/KG/MIN: 10 INJECTION, EMULSION INTRAVENOUS at 13:10

## 2024-04-16 RX ADMIN — PHENYLEPHRINE HYDROCHLORIDE 20 MCG/MIN: 10 INJECTION INTRAVENOUS at 13:11

## 2024-04-16 RX ADMIN — FENTANYL CITRATE 50 MCG: 50 INJECTION INTRAMUSCULAR; INTRAVENOUS at 13:04

## 2024-04-16 NOTE — ANESTHESIA PREPROCEDURE EVALUATION
Procedure:  ENDOBRONCHIAL ULTRASOUND (EBUS) ([facility])  BRONCHOSCOPY FLEXIBLE ([facility])    Relevant Problems   CARDIO   (+) Essential hypertension   (+) Hypercholesterolemia        Physical Exam    Airway    Mallampati score: II  TM Distance: >3 FB  Neck ROM: full     Dental        Cardiovascular  Rhythm: regular, Rate: normal    Pulmonary      Other Findings        Anesthesia Plan  ASA Score- 3     Anesthesia Type- general with ASA Monitors.         Additional Monitors:     Airway Plan: LMA.           Plan Factors-Exercise tolerance (METS): >4 METS.    Chart reviewed. EKG reviewed.  Existing labs reviewed.     Patient is not a current smoker.              Induction- intravenous.    Postoperative Plan- Plan for postoperative opioid use. Planned trial extubation    Informed Consent- Anesthetic plan and risks discussed with patient and spouse.  I personally reviewed this patient with the CRNA. Discussed and agreed on the Anesthesia Plan with the CRNA..              Cr 0.84  K 3.7  Hgb 15.8  Plt 116    NPO  No problems with ane  Denies CP/SOB  Can walk a mile, with some SOB  Denies GERD    ECG 12 lead  Status: Final result     ECG 12 lead  Order: [id number]   Status: Final result       Visible to patient: Yes (not seen)       Next appt: 04/26/2024 at 09:20 AM in Family Medicine (Maikel Brower, DO)    0 Result Notes          Component  Ref Range & Units 6/29/23 1226 3/23/23 1825 3/19/23 1342 3/19/23 1147 2/11/20 1029   Ventricular Rate  BPM 66 67 74 73 65   Atrial Rate  BPM 66 67 74 73 65   OR Interval  ms 214 188 188 186 186   QRSD Interval  ms 88 83 86 82 92   QT Interval  ms 416 400 392 366 398   QTC Interval  ms 436 423 435 403 413   P Axis  degrees 28 50 34 52 31   QRS Axis  degrees -33 6 -30 -38 -35   T Wave Axis  degrees -4 13 51 53 58              Narrative & Impression    Sinus rhythm with 1st degree A-V block  Left axis deviation  Minimal voltage criteria for LVH, may be normal variant  Abnormal  ECG  When compared with ECG of 23-MAR-2023 18:25,  QRS axis Shifted left  Nonspecific T wave abnormality no longer evident in Lateral leads  Confirmed by Pj Peña ([id number]) on 6/30/2023 8:52:24 AM           Echo 3/23    Left Ventricle: Left ventricular cavity size is normal. Wall thickness is mildly increased. There is mild concentric hypertrophy. The left ventricular ejection fraction is 75%. Systolic function is hyperdynamic. Wall motion is normal. Diastolic function is mildly abnormal, consistent with grade I (abnormal) relaxation.    Right Ventricle: Right ventricular cavity size is normal. Systolic function is hyperdynamic.    Left Atrium: The atrium is mildly dilated.    Mitral Valve: There is mild annular calcification.    Tricuspid Valve: There is mild regurgitation.

## 2024-04-16 NOTE — ANESTHESIA POSTPROCEDURE EVALUATION
Post-Op Assessment Note    CV Status:  Stable  Pain Score: 0    Pain management: adequate       Mental Status:  Alert and sleepy   Hydration Status:  Stable   PONV Controlled:  None   Airway Patency:  Patent     Post Op Vitals Reviewed: Yes    No anethesia notable event occurred.    Staff: CRNA               /83 (04/16/24 1411)    Temp (!) 97.4 °F (36.3 °C) (04/16/24 1411)    Pulse 69 (04/16/24 1411)   Resp 22 (04/16/24 1411)    SpO2 98 % (04/16/24 1411)

## 2024-04-16 NOTE — OP NOTE
OPERATIVE REPORT  PATIENT NAME: Paras Pitts    :  1955  MRN: [id number]  Pt Location:  OR ROOM 17    SURGERY DATE: 2024    Surgeons and Role:     * Kalia Sparrow MD - Primary    Preop Diagnosis:  Carcinoma of right lung (HCC) [C34.91]    Post-Op Diagnosis Codes:     * Carcinoma of right lung (HCC) [C34.91]    Procedure(s):  EBUS with biopsy of 3 lymph nodes  Bronchoscopy     Specimen(s):  ID Type Source Tests Collected by Time Destination   1 : LEVEL 4R FNA Lymph Node FINE NEEDLE ASPIRATION/BIOPSY Kalia Sparrow MD 2024 1321    2 : LEVEL 2R FNA Lymph Node FINE NEEDLE ASPIRATION/BIOPSY Kalia Sparrow MD 2024 1334    3 : LEVEL 11R FNA Lymph Node FINE NEEDLE ASPIRATION/BIOPSY Kalia Sparrow MD 2024 1343        Estimated Blood Loss:   Minimal    Drains:  * No LDAs found *    Anesthesia Type:   General    Operative Indications:  Carcinoma of right lung (HCC) [C34.91]  68-year-old male with history of a stage IV right upper lobe adenocarcinoma now with enlarging mediastinal adenopathy suspicious for ongoing disease    Operative Findings:  evidence of non-small cell lung cancer in level 2R and 4R.    Complications:   None    Procedure and Technique:  After obtaining informed consent from the patient, they were transported to the operating room and placed supine on the OR table.  Monitored anesthesia conditions were administered and an LMA airway was introduced into the oropharynx. A formal time-out was performed at this time including patient, date of birth, intended procedure, antibiotic usage as appropriate, beta-blocker usage as appropriate and plans for specimen handling.    An adult bronchoscope was inserted into the LMA and the vocal cords were anesthetized with 10 mL of 2% lidocaine.  The bronchoscope was then inserted into the main trachea and the josue was again anesthetized with 10 mL of 2% lidocaine. A thorough bronchoscopy was then performed examining the trachea,  mainstem bronchi, sigmoid segmental and subsegmental bronchi.  There were no endo luminal masses or any other abnormalities identified. All mucus was suctioned out to improve visualization.  There is no suspicion or identified risk for TB or other air board infectious disease.  This bronchoscopy was being performed for diagnostic purposes only.      The Olympus EBUS scope was then inserted and used to identify mediastinal lymph node stations. Mediastinal stations 2R, 4R, 7, 10 R, 11 R, and 11 L were visualized using ultrasound guidance. Color Doppler was used as needed to identify and avoid surrounding vasculature. A 21 gauge EBUS needle was used to obtain node biopsies under direct visualization.     Biopsies were obtained from stations 2R, 4R, and 11 over.     These were immediately analyzed by cytopathology showing malignancy in level 2 and 4.  This looks consistent with non-small cell lung cancer.    There was no significant bleeding seen from the airway. This portion of the procedure was completed at this time.  The bronchoscope was reinserted and the airway was fully suctioned out.  The patient awoke and the LMA was removed without issue.  They transferred to the PACU in stable condition     I was present for the entire procedure.    Patient Disposition:  PACU         SIGNATURE: Kalia Sparrow MD  DATE: April 16, 2024  TIME: 2:55 PM

## 2024-04-19 DIAGNOSIS — C34.91 CARCINOMA OF LUNG, RIGHT (HCC): Primary | ICD-10-CM

## 2024-04-19 PROCEDURE — 88342 IMHCHEM/IMCYTCHM 1ST ANTB: CPT | Performed by: STUDENT IN AN ORGANIZED HEALTH CARE EDUCATION/TRAINING PROGRAM

## 2024-04-19 PROCEDURE — 88341 IMHCHEM/IMCYTCHM EA ADD ANTB: CPT | Performed by: STUDENT IN AN ORGANIZED HEALTH CARE EDUCATION/TRAINING PROGRAM

## 2024-04-19 PROCEDURE — 88173 CYTOPATH EVAL FNA REPORT: CPT | Performed by: STUDENT IN AN ORGANIZED HEALTH CARE EDUCATION/TRAINING PROGRAM

## 2024-04-19 PROCEDURE — 88305 TISSUE EXAM BY PATHOLOGIST: CPT | Performed by: STUDENT IN AN ORGANIZED HEALTH CARE EDUCATION/TRAINING PROGRAM

## 2024-04-19 PROCEDURE — 88172 CYTP DX EVAL FNA 1ST EA SITE: CPT | Performed by: STUDENT IN AN ORGANIZED HEALTH CARE EDUCATION/TRAINING PROGRAM

## 2024-04-20 ENCOUNTER — HOSPITAL ENCOUNTER (INPATIENT)
Facility: HOSPITAL | Age: 69
LOS: 1 days | DRG: 091 | End: 2024-04-22
Attending: EMERGENCY MEDICINE | Admitting: HOSPITALIST
Payer: COMMERCIAL

## 2024-04-20 ENCOUNTER — APPOINTMENT (EMERGENCY)
Dept: CT IMAGING | Facility: HOSPITAL | Age: 69
DRG: 091 | End: 2024-04-20
Payer: COMMERCIAL

## 2024-04-20 DIAGNOSIS — R53.1 GENERALIZED WEAKNESS: ICD-10-CM

## 2024-04-20 DIAGNOSIS — Z86.73 HISTORY OF CVA (CEREBROVASCULAR ACCIDENT): ICD-10-CM

## 2024-04-20 DIAGNOSIS — C34.90 METASTATIC LUNG CANCER (METASTASIS FROM LUNG TO OTHER SITE) (HCC): ICD-10-CM

## 2024-04-20 DIAGNOSIS — R56.9 PARTIAL SEIZURES (HCC): Primary | ICD-10-CM

## 2024-04-20 PROBLEM — R25.1 TREMORS OF NERVOUS SYSTEM: Status: ACTIVE | Noted: 2024-04-20

## 2024-04-20 LAB
ALBUMIN SERPL BCP-MCNC: 4.9 G/DL (ref 3.5–5)
ALP SERPL-CCNC: 48 U/L (ref 34–104)
ALT SERPL W P-5'-P-CCNC: 27 U/L (ref 7–52)
ANION GAP SERPL CALCULATED.3IONS-SCNC: 16 MMOL/L (ref 4–13)
APTT PPP: 22 SECONDS (ref 23–37)
AST SERPL W P-5'-P-CCNC: 25 U/L (ref 13–39)
BASOPHILS # BLD MANUAL: 0 THOUSAND/UL (ref 0–0.1)
BASOPHILS NFR MAR MANUAL: 0 % (ref 0–1)
BILIRUB SERPL-MCNC: 1.09 MG/DL (ref 0.2–1)
BUN SERPL-MCNC: 16 MG/DL (ref 5–25)
CALCIUM SERPL-MCNC: 9.7 MG/DL (ref 8.4–10.2)
CHLORIDE SERPL-SCNC: 102 MMOL/L (ref 96–108)
CO2 SERPL-SCNC: 23 MMOL/L (ref 21–32)
CREAT SERPL-MCNC: 0.95 MG/DL (ref 0.6–1.3)
EOSINOPHIL # BLD MANUAL: 0.12 THOUSAND/UL (ref 0–0.4)
EOSINOPHIL NFR BLD MANUAL: 1 % (ref 0–6)
ERYTHROCYTE [DISTWIDTH] IN BLOOD BY AUTOMATED COUNT: 15.5 % (ref 11.6–15.1)
GFR SERPL CREATININE-BSD FRML MDRD: 80 ML/MIN/1.73SQ M
GIANT PLATELETS BLD QL SMEAR: PRESENT
GLUCOSE SERPL-MCNC: 142 MG/DL (ref 65–140)
HCT VFR BLD AUTO: 46.3 % (ref 36.5–49.3)
HGB BLD-MCNC: 15.3 G/DL (ref 12–17)
INR PPP: 0.92 (ref 0.84–1.19)
LYMPHOCYTES # BLD AUTO: 2.91 THOUSAND/UL (ref 0.6–4.47)
LYMPHOCYTES # BLD AUTO: 25 % (ref 14–44)
MAGNESIUM SERPL-MCNC: 1.9 MG/DL (ref 1.9–2.7)
MCH RBC QN AUTO: 31.7 PG (ref 26.8–34.3)
MCHC RBC AUTO-ENTMCNC: 33 G/DL (ref 31.4–37.4)
MCV RBC AUTO: 96 FL (ref 82–98)
MONOCYTES # BLD AUTO: 0.58 THOUSAND/UL (ref 0–1.22)
MONOCYTES NFR BLD: 5 % (ref 4–12)
NEUTROPHILS # BLD MANUAL: 8.02 THOUSAND/UL (ref 1.85–7.62)
NEUTS SEG NFR BLD AUTO: 69 % (ref 43–75)
PHOSPHATE SERPL-MCNC: 3.5 MG/DL (ref 2.3–4.1)
PLATELET # BLD AUTO: 166 THOUSANDS/UL (ref 149–390)
PLATELET BLD QL SMEAR: ADEQUATE
PMV BLD AUTO: 9.4 FL (ref 8.9–12.7)
POTASSIUM SERPL-SCNC: 4 MMOL/L (ref 3.5–5.3)
PROT SERPL-MCNC: 7.3 G/DL (ref 6.4–8.4)
PROTHROMBIN TIME: 12.6 SECONDS (ref 11.6–14.5)
RBC # BLD AUTO: 4.83 MILLION/UL (ref 3.88–5.62)
RBC MORPH BLD: NORMAL
SODIUM SERPL-SCNC: 141 MMOL/L (ref 135–147)
WBC # BLD AUTO: 11.63 THOUSAND/UL (ref 4.31–10.16)

## 2024-04-20 PROCEDURE — 99223 1ST HOSP IP/OBS HIGH 75: CPT | Performed by: HOSPITALIST

## 2024-04-20 PROCEDURE — 99285 EMERGENCY DEPT VISIT HI MDM: CPT

## 2024-04-20 PROCEDURE — 93005 ELECTROCARDIOGRAM TRACING: CPT

## 2024-04-20 PROCEDURE — 96374 THER/PROPH/DIAG INJ IV PUSH: CPT

## 2024-04-20 PROCEDURE — 84100 ASSAY OF PHOSPHORUS: CPT | Performed by: EMERGENCY MEDICINE

## 2024-04-20 PROCEDURE — 99291 CRITICAL CARE FIRST HOUR: CPT | Performed by: EMERGENCY MEDICINE

## 2024-04-20 PROCEDURE — 80053 COMPREHEN METABOLIC PANEL: CPT | Performed by: EMERGENCY MEDICINE

## 2024-04-20 PROCEDURE — 85007 BL SMEAR W/DIFF WBC COUNT: CPT | Performed by: EMERGENCY MEDICINE

## 2024-04-20 PROCEDURE — 36415 COLL VENOUS BLD VENIPUNCTURE: CPT | Performed by: EMERGENCY MEDICINE

## 2024-04-20 PROCEDURE — 70450 CT HEAD/BRAIN W/O DYE: CPT

## 2024-04-20 PROCEDURE — 85730 THROMBOPLASTIN TIME PARTIAL: CPT | Performed by: EMERGENCY MEDICINE

## 2024-04-20 PROCEDURE — 83735 ASSAY OF MAGNESIUM: CPT | Performed by: EMERGENCY MEDICINE

## 2024-04-20 PROCEDURE — 96375 TX/PRO/DX INJ NEW DRUG ADDON: CPT

## 2024-04-20 PROCEDURE — 85027 COMPLETE CBC AUTOMATED: CPT | Performed by: EMERGENCY MEDICINE

## 2024-04-20 PROCEDURE — 85610 PROTHROMBIN TIME: CPT | Performed by: EMERGENCY MEDICINE

## 2024-04-20 RX ORDER — ONDANSETRON 2 MG/ML
4 INJECTION INTRAMUSCULAR; INTRAVENOUS EVERY 6 HOURS PRN
Status: DISCONTINUED | OUTPATIENT
Start: 2024-04-20 | End: 2024-04-22 | Stop reason: HOSPADM

## 2024-04-20 RX ORDER — ACETAMINOPHEN 325 MG/1
650 TABLET ORAL EVERY 6 HOURS PRN
Status: DISCONTINUED | OUTPATIENT
Start: 2024-04-20 | End: 2024-04-22 | Stop reason: HOSPADM

## 2024-04-20 RX ORDER — DEXAMETHASONE 0.5 MG/1
1 TABLET ORAL DAILY
Status: DISCONTINUED | OUTPATIENT
Start: 2024-04-21 | End: 2024-04-22 | Stop reason: HOSPADM

## 2024-04-20 RX ORDER — ATORVASTATIN CALCIUM 40 MG/1
40 TABLET, FILM COATED ORAL
Status: DISCONTINUED | OUTPATIENT
Start: 2024-04-20 | End: 2024-04-22 | Stop reason: HOSPADM

## 2024-04-20 RX ORDER — ENOXAPARIN SODIUM 100 MG/ML
40 INJECTION SUBCUTANEOUS DAILY
Status: DISCONTINUED | OUTPATIENT
Start: 2024-04-21 | End: 2024-04-22 | Stop reason: HOSPADM

## 2024-04-20 RX ORDER — PANTOPRAZOLE SODIUM 40 MG/1
40 TABLET, DELAYED RELEASE ORAL DAILY
Status: DISCONTINUED | OUTPATIENT
Start: 2024-04-21 | End: 2024-04-22 | Stop reason: HOSPADM

## 2024-04-20 RX ORDER — LORAZEPAM 2 MG/ML
1 INJECTION INTRAMUSCULAR ONCE
Status: COMPLETED | OUTPATIENT
Start: 2024-04-20 | End: 2024-04-20

## 2024-04-20 RX ORDER — LEVETIRACETAM 500 MG/5ML
1000 INJECTION, SOLUTION, CONCENTRATE INTRAVENOUS ONCE
Status: COMPLETED | OUTPATIENT
Start: 2024-04-20 | End: 2024-04-20

## 2024-04-20 RX ORDER — AMLODIPINE BESYLATE 10 MG/1
10 TABLET ORAL DAILY
Status: DISCONTINUED | OUTPATIENT
Start: 2024-04-21 | End: 2024-04-22 | Stop reason: HOSPADM

## 2024-04-20 RX ORDER — LOSARTAN POTASSIUM 50 MG/1
50 TABLET ORAL DAILY
Status: DISCONTINUED | OUTPATIENT
Start: 2024-04-21 | End: 2024-04-22 | Stop reason: HOSPADM

## 2024-04-20 RX ADMIN — ATORVASTATIN CALCIUM 40 MG: 40 TABLET, FILM COATED ORAL at 17:25

## 2024-04-20 RX ADMIN — LEVETIRACETAM 1000 MG: 100 INJECTION INTRAVENOUS at 15:53

## 2024-04-20 RX ADMIN — LORAZEPAM 1 MG: 2 INJECTION INTRAMUSCULAR; INTRAVENOUS at 13:47

## 2024-04-20 NOTE — PLAN OF CARE
Problem: INFECTION - ADULT  Goal: Absence or prevention of progression during hospitalization  Description: INTERVENTIONS:  - Assess and monitor for signs and symptoms of infection  - Monitor lab/diagnostic results  - Monitor all insertion sites, i.e. indwelling lines, tubes, and drains  - Monitor endotracheal if appropriate and nasal secretions for changes in amount and color  - [place] appropriate cooling/warming therapies per order  - Administer medications as ordered  - Instruct and encourage patient and family to use good hand hygiene technique  - Identify and instruct in appropriate isolation precautions for identified infection/condition  Outcome: Not Progressing

## 2024-04-20 NOTE — ED NOTES
10/8/20  HISTORY OF PRESENT ILLNESS:   S/P Knee Arthroscopy  The Patient returns today for their postoperative visit after 7/21/2020 left knee arthroscopy. Pain control has been satisfactory with oral medications. There have been no fevers or chills. PHYSICAL EXAMINATION: Left knee  Inspection reveals expected swelling. Portal sites are clean and dry. The skin is warm. Range of motion is not limited by pain and swelling. There is no calf pain  Homans sign is negative. Examination of the contralateral knee reveals warm skin, range of motion within normal limits, good quadriceps bulk, tone and strength, no tenderness to palpation, stable cruciate and collateral ligaments, and no joint line tenderness. Examination of the Patients Lumbar spine reveals the skin is warm and dry. There is no swelling, warmth, or erythema. Range of motion is within normal limits. There is no paraspinal or spinous process tenderness. Ipsilateral and contralateral straight leg raising tests are negative. The distal neurovascular exam is grossly intact and symmetric. ASSESSMENT/PLAN:   The patient is doing well after knee arthroscopy. I have recommended ice, judicious use of NSAIDs, and physical therapy to diminish swelling and restore both motion and strength. I cautioned against overusing the knee, and they will schedule a reevaluation in 6 months  They verbalized good understanding of the plan. The patient is symptomatic from left osteoarthritis of the knee joint with documented radiological signs of arthritis. The patient has also failed 3 months of conservative treatment including home exercise, education, Tylenol and/or NSAIDs use. The patient was offered a Visco supplementation today. Risks, benefits, and alternatives to the injections were discussed in detail with the patient. The risks discussed included but are not limited to infection, skin reactions, hot swollen joints, and anaphylaxis.  The patient gave EKG Completed      Yi Chang  04/20/24 8450     verbal informed consent for the injection. The patient's skin was prepped with  sterile gauze  pads soaked with alcohol solution and with an 18 gauge needle the left  knee joint was injected with 4 ml of Monovisc intra-articularly under sterile conditions. I added 1.5cc of celestone to the injection. The patient tolerated the injection reasonably well. The patient was given instructions to ice the left knee and avoid strenuous activities for 24-48 hours. The patient was instructed to call the office immediately if there is increased pain, redness, warmth, fever, or chills. JOSUE Dockery  70. and Sports Medicine  Sports Fellowship Trained  Board Certified  [employer] Team Physician      Disclaimer: \"This note was dictated with voice recognition software. Though review and correction are routine, we apologize for any errors. \"

## 2024-04-20 NOTE — H&P
[facility]  H&P  Name: Paras Pitts 68 y.o. male I MRN: [id number]  Unit/Bed#: -01 I Date of Admission: 4/20/2024   Date of Service: 4/20/2024 I Hospital Day: 0      Assessment/Plan   * Tremors of nervous system  Assessment & Plan  Presented to the ED today with complaints of left arm and leg tremors  See plan for history of CVA    Generalized weakness  Assessment & Plan  Presented to the ED with complaints of generalized weakness  Also with new onset left upper and lower extremity tremors  Head CT: Right frontal lobe vasogenic edema again identified, known lesion better seen on recent MRI. No significant mass effect.  ED physician discussed with neurologist who recommended admit for MRI brain and antiepileptics  Will obtain MRI brain  Received Keppra 1000 mg IV x 1 in the ED  Consult PT OT  Fall precautions      History of CVA (cerebrovascular accident)  Assessment & Plan  History of CVA with left-sided deficits  Reported to the ED today for left upper and lower extremity tremors that started on Wednesday then went away until returning at 1 PM today  Left upper and lower extremity weakness on exam, worse than baseline per patient  Head CT negative  ED physician spoke with neurology who recommended admit for MRI and antiepileptics  MRI brain pending  Monitor telemetry  Received Keppra 1000 mg IV x 1 in the ED  Neurological checks    Metastatic adenocarcinoma (HCC)  Assessment & Plan  S/p robotic converted to right thoracotomy and right upper lobectomy on 9/1/2023  Received chemotherapy/immunotherapy and surgical resection of brain lesion followed by SRS  PET scan 3/25/2024: FDG-avid mediastinal lymphadenopathy as seen on recent CT compatible with metastasis.  4/16/24 had bronchoscopy and EBUS biopsy of 3 lymph nodes at [facility]  Will continue to follow with hematology/oncology in outpatient setting    Overweight (BMI 25.0-29.9)  Assessment & Plan  BMI 28.8  Counseled on diet, exercise,  lifestyle changes    Carcinoma of right lung (HCC)  Assessment & Plan  Patient is s/p robotic converted to right thoracotomy and right upper lobectomy on 9/1/2023 for poorly differentiated adenocarcinoma per patient's records   s/p bronchoscopy and EBUS biopsy of 3 lymph nodes at [facility] on 4/16/2024  Patient is on dexamethasone in the process of being weaned   Patient will continue to follow with hematology/oncology in outpatient setting    Hypercholesterolemia  Assessment & Plan  Continue prehospital statin    Essential hypertension  Assessment & Plan  Blood pressures controlled  Continue home antihypertensive regimen  Monitor BP per protocol         VTE Pharmacologic Prophylaxis: VTE Score: 8 High Risk (Score >/= 5) - Pharmacological DVT Prophylaxis Ordered: enoxaparin (Lovenox). Sequential Compression Devices Ordered.  Code Status: Full  Discussion with family: Updated  (wife) via phone.    Anticipated Length of Stay: Patient will be admitted on an observation basis with an anticipated length of stay of less than 2 midnights secondary to pending MRI of the brain, monitoring on telemetry neurological checks patient reports worsening left upper and lower extremity weakness with tremors.    Total Time Spent on Date of Encounter in care of patient: 38 mins. This time was spent on one or more of the following: performing physical exam; counseling and coordination of care; obtaining or reviewing history; documenting in the medical record; reviewing/ordering tests, medications or procedures; communicating with other healthcare professionals and discussing with patient's family/caregivers.    Chief Complaint: Left upper and lower extremity tremors with generalized weakness    History of Present Illness:  Paras Pitts is a 68 y.o. male with a PMH of CVA, metastatic adenocarcinoma, essential hypertension, hypercholesteremia who presents with complaints of left upper and lower extremity tremors.  Patient has a  history of CVA with left-sided weakness, although patient states that his left arm and leg are weaker than his baseline.  He reports that he had tremors of his left arm and leg sudden onset on Wednesday that resolved on their own.  He reports that the tremors reoccurred around 1 PM today and were worse than they were on Wednesday to the point that he almost fell out of the chair.  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  Head CT negative.  ED physician spoke with neurology who recommended admit for MRI brain and to give antiepileptics.  Patient received 1000 mg of Keppra IV in the ED and 1 mg of Ativan.  Did not have tremors at time of my exam.  Will monitor on telemetry.  Patient is being admitted to [facility] service as observation.    Review of Systems:  Review of Systems   Constitutional:  Positive for activity change. Negative for appetite change, chills, diaphoresis, fatigue and unexpected weight change.   HENT: Negative.     Eyes: Negative.    Respiratory: Negative.  Negative for cough and shortness of breath.    Cardiovascular: Negative.  Negative for chest pain and palpitations.   Gastrointestinal: Negative.  Negative for constipation, diarrhea, nausea and vomiting.   Endocrine: Negative.    Genitourinary: Negative.  Negative for dysuria and frequency.   Musculoskeletal:  Positive for gait problem.        Generalized weakness   Skin: Negative.    Allergic/Immunologic: Negative.    Neurological:  Positive for tremors, weakness and numbness. Negative for dizziness, seizures, syncope, facial asymmetry, speech difficulty, light-headedness and headaches.        Numbness in left arm and leg   Hematological: Negative.    Psychiatric/Behavioral: Negative.         Past Medical and Surgical History:   Past Medical History:   Diagnosis Date    Brain compression (HCC) 03/20/2023    Brain mass 03/20/2023    Cancer (HCC) 2001    Receint lung and brain  lesions and prostate cancer in 2001    Cerebral edema (HCC) 03/20/2023    CVA (cerebral vascular accident) (HCC) 08/12/2021    Hypertension     Prostate cancer (HCC) 2001    Rectal bleeding     Stroke (HCC)     2011         Past Surgical History:   Procedure Laterality Date    COLONOSCOPY      CRANIOTOMY Right 3/23/2023    Procedure: Right frontal CRANIOTOMY IMAGE-GUIDED FOR TUMOR;  Surgeon: Clark Carrillo MD;  Location: [facility];  Service: Neurosurgery    ENDOBRONCHIAL ULTRASOUND (EBUS) N/A 4/16/2024    Procedure: ENDOBRONCHIAL ULTRASOUND (EBUS);  Surgeon: Kalia Sparrow MD;  Location: [facility];  Service: Thoracic    IR PORT PLACEMENT  4/27/2023    SD BRNCC INCL FLUOR GDNCE DX W/CELL WASHG SPX N/A 9/1/2023    Procedure: BRONCHOSCOPY FLEXIBLE;  Surgeon: Kalia Sparrow MD;  [facility]: [facility];  Service: Thoracic    SD BRNCHSC INCL FLUOR GDNCE DX W/CELL WASHG SPX N/A 4/16/2024    Procedure: BRONCHOSCOPY FLEXIBLE;  Surgeon: Kalia Sparrow MD;  [facility];  Service: Thoracic    SD CYSTOURETHROSCOPY N/A 3/23/2023    Procedure: EUA, DEL CASTILLO INSERTION;  Surgeon: Ajay Piedra MD;  [facility];  Service: Urology    SD THORACOSCOPY W/LOBECTOMY SINGLE LOBE Right 9/1/2023    Procedure: robotic assisted converted to open right upper lobectomy, lymph node dissection;  Surgeon: Kalia Sparrow MD;  [facility];  Service: Thoracic    PROSTATECTOMY  2001    THORACOTOMY Right 9/1/2023    Procedure: right thoracotomy with Cryo-Ablation;  Surgeon: Kalia Sparrow MD;  [facility];  Service: Thoracic    TONSILLECTOMY         Meds/Allergies:  Prior to Admission medications    Medication Sig Start Date End Date Taking? Authorizing Provider   amLODIPine (NORVASC) 10 mg tablet Take 1 tablet (10 mg total) by mouth daily 2/27/24   Maikel Valencia,    atorvastatin (LIPITOR) 40 mg tablet Take 1 tablet (40 mg total) by mouth daily after dinner 4/2/24 5/2/24  Maikel Vazquez  DO Erik   dexamethasone (DECADRON) 2 mg tablet Take 1 tablet (2 mg total) by mouth 2 (two) times a day with meals  Patient taking differently: Take 1 mg by mouth daily with breakfast 3/22/24   James Contreras MD   gabapentin (Neurontin) 300 mg capsule Take 1 capsule (300 mg total) by mouth 3 (three) times a day 12/13/23 4/10/24  Shante Connelly PA-C   hydroCHLOROthiazide 25 mg tablet Take 25 mg by mouth daily  Patient not taking: Reported on 2024 3/14/24   Historical Provider, MD   losartan (COZAAR) 50 mg tablet Take 1 tablet (50 mg total) by mouth daily 24   Maikel Valencia DO   pantoprazole (PROTONIX) 40 mg tablet Take 1 tablet (40 mg total) by mouth daily 4/10/24   James Contreras MD     I have reviewed home medications with patient personally.    Allergies: No Known Allergies    Social History:  Marital Status: /Civil Union   Occupation: Owns a [employer]  Patient Pre-hospital Living Situation: Home  Patient Pre-hospital Level of Mobility: walks  Patient Pre-hospital Diet Restrictions: None  Substance Use History:   Social History     Substance and Sexual Activity   Alcohol Use Yes    Alcohol/week: 6.0 standard drinks of alcohol    Types: 6 Cans of beer per week    Comment: socially  last drink 3/23     Social History     Tobacco Use   Smoking Status Former    Current packs/day: 0.00    Average packs/day: 1 pack/day for 15.0 years (15.0 ttl pk-yrs)    Types: Cigarettes    Start date:     Quit date:     Years since quittin.3    Passive exposure: Never   Smokeless Tobacco Never   Tobacco Comments    i quit when i was 28. not sure of exact dates     Social History     Substance and Sexual Activity   Drug Use Yes    Types: Marijuana    Comment: gummies foro nausea with chemo       Family History:  Family History   Problem Relation Age of Onset    Dementia Mother             Breast cancer Sister             Prostate cancer Brother         Received  Radiation       Physical Exam:     Vitals:   Blood Pressure: 128/81 (04/20/24 1622)  Pulse: 79 (04/20/24 1622)  Temperature: 97.6 °F (36.4 °C) (04/20/24 1615)  Temp Source: Temporal (04/20/24 1615)  Respirations: 18 (04/20/24 1615)  SpO2: 97 % (04/20/24 1622)    Physical Exam  Vitals and nursing note reviewed.   Constitutional:       General: He is not in acute distress.     Appearance: He is well-developed. He is not ill-appearing.   HENT:      Head: Normocephalic and atraumatic.   Eyes:      Extraocular Movements: Extraocular movements intact.      Conjunctiva/sclera: Conjunctivae normal.      Pupils: Pupils are equal, round, and reactive to light.   Cardiovascular:      Rate and Rhythm: Normal rate and regular rhythm.      Pulses: Normal pulses.      Heart sounds: Normal heart sounds. No murmur heard.  Pulmonary:      Effort: Pulmonary effort is normal. No respiratory distress.      Breath sounds: Normal breath sounds. No wheezing or rales.   Abdominal:      General: Bowel sounds are normal. There is no distension.      Palpations: Abdomen is soft.      Tenderness: There is no abdominal tenderness. There is no guarding.   Musculoskeletal:         General: No swelling. Normal range of motion.   Skin:     General: Skin is warm and dry.      Capillary Refill: Capillary refill takes less than 2 seconds.   Neurological:      General: No focal deficit present.      Mental Status: He is alert and oriented to person, place, and time. Mental status is at baseline.   Psychiatric:         Mood and Affect: Mood normal.         Behavior: Behavior normal.         Thought Content: Thought content normal.         Judgment: Judgment normal.          Additional Data:     Lab Results:  Results from last 7 days   Lab Units 04/20/24  1345   WBC Thousand/uL 11.63*   HEMOGLOBIN g/dL 15.3   HEMATOCRIT % 46.3   PLATELETS Thousands/uL 166   LYMPHO PCT % 25   MONO PCT % 5   EOS PCT % 1     Results from last 7 days   Lab Units  04/20/24  1345   SODIUM mmol/L 141   POTASSIUM mmol/L 4.0   CHLORIDE mmol/L 102   CO2 mmol/L 23   BUN mg/dL 16   CREATININE mg/dL 0.95   ANION GAP mmol/L 16*   CALCIUM mg/dL 9.7   ALBUMIN g/dL 4.9   TOTAL BILIRUBIN mg/dL 1.09*   ALK PHOS U/L 48   ALT U/L 27   AST U/L 25   GLUCOSE RANDOM mg/dL 142*     Results from last 7 days   Lab Units 04/20/24  1345   INR  0.92                   Lines/Drains:  Invasive Devices       Central Venous Catheter Line  Duration             Port A Cath 04/27/23 Right Subclavian 359 days              Peripheral Intravenous Line  Duration             Peripheral IV 04/20/24 Left Antecubital <1 day                    Central Line:  Goal for removal:  Unaccessed port           Imaging: Reviewed radiology reports from this admission including: CT head  CT head without contrast   Final Result by Ace Ordoñez MD (04/20 1884)      Right frontal lobe vasogenic edema again identified, known lesion better seen on recent MRI. No significant mass effect.                  Workstation performed: [id number]         MRI inpatient order    (Results Pending)       EKG and Other Studies Reviewed on Admission:   EKG: NSR. .    ** Please Note: This note has been constructed using a voice recognition system. **

## 2024-04-20 NOTE — ED PROVIDER NOTES
History  Chief Complaint   Patient presents with    Weakness - Generalized     Left sided weakness for a couple weeks    Tremors     Uncontrollable tremors of left leg and left arm. Intermittent  being treated with steroids, Patient reports the DR cut his dose in half a week ago and that's when these symptoms occurred.      5-year-old male with history of stage IV lung cancer presents emergency department complaining of uncontrollable twitching in his left arm and left leg.  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  The patient has been on chronic steroids and has been slowly decreasing his dose and he is recently received immunotherapy as well.  The patient denies any trauma fever chills or bad headache.  The patient notes that he has a history of a stroke that has affected the left side as well and that has created chronic weakness.  The patient notes that until last 3 days ago, he occasionally get an small twitching sensation in his hand but nothing more.  The symptoms have been ongoing for 72 hours.  Nurse who walked into the [facility] ended up calling 911 and having the patient brought to the ER.        Prior to Admission Medications   Prescriptions Last Dose Informant Patient Reported? Taking?   amLODIPine (NORVASC) 10 mg tablet 4/20/2024  No Yes   Sig: Take 1 tablet (10 mg total) by mouth daily   atorvastatin (LIPITOR) 40 mg tablet 4/20/2024  No Yes   Sig: Take 1 tablet (40 mg total) by mouth daily after dinner   dexamethasone (DECADRON) 2 mg tablet 4/20/2024  No Yes   Sig: Take 1 tablet (2 mg total) by mouth 2 (two) times a day with meals   Patient taking differently: Take 1 mg by mouth daily with breakfast   gabapentin (Neurontin) 300 mg capsule  Self No No   Sig: Take 1 capsule (300 mg total) by  mouth 3 (three) times a day   hydroCHLOROthiazide 25 mg tablet Not Taking  Yes No   Sig: Take 25 mg by mouth daily   Patient not taking: Reported on 4/20/2024   losartan (COZAAR) 50 mg tablet 4/20/2024  No Yes   Sig: Take 1 tablet (50 mg total) by mouth daily   pantoprazole (PROTONIX) 40 mg tablet 4/20/2024  No Yes   Sig: Take 1 tablet (40 mg total) by mouth daily      Facility-Administered Medications: None       Past Medical History:   Diagnosis Date    Brain compression (HCC) 03/20/2023    Brain mass 03/20/2023    Cancer (HCC) 2001    Receint lung and brain lesions and prostate cancer in 2001    Cerebral edema (HCC) 03/20/2023    CVA (cerebral vascular accident) (HCC) 08/12/2021    Hypertension     Prostate cancer (HCC) 2001    Rectal bleeding     Stroke (HCC)     2011         Past Surgical History:   Procedure Laterality Date    COLONOSCOPY      CRANIOTOMY Right 3/23/2023    Procedure: Right frontal CRANIOTOMY IMAGE-GUIDED FOR TUMOR;  Surgeon: Clark Carrillo MD;  [facility];  Service: Neurosurgery    ENDOBRONCHIAL ULTRASOUND (EBUS) N/A 4/16/2024    Procedure: ENDOBRONCHIAL ULTRASOUND (EBUS);  Surgeon: Kalia Sparrow MD;  [facility];  Service: Thoracic    IR PORT PLACEMENT  4/27/2023    NM [facility] INCL FLUOR GDNCE DX W/CELL WASHG SPX N/A 9/1/2023    Procedure: BRONCHOSCOPY FLEXIBLE;  Surgeon: Kalia Sparrow MD;  [facility];  Service: Thoracic    [facility]/CELL WASHG SPX N/A 4/16/2024    Procedure: BRONCHOSCOPY FLEXIBLE;  Surgeon: Kalia Sparrow MD;  [facility];  Service: Thoracic    NM CYSTOURETHROSCOPY N/A 3/23/2023    Procedure: EUA, DEL CASTILLO INSERTION;  Surgeon: Ajay Piedra MD;  [facility];  Service: Urology    NM THORACOSCOPY W/LOBECTOMY SINGLE LOBE Right 9/1/2023    Procedure: robotic assisted converted to open right upper lobectomy, lymph node dissection;  Surgeon: Kalia Sparrow MD;  [facility];  Service:  Thoracic    PROSTATECTOMY  2001    THORACOTOMY Right 2023    Procedure: right thoracotomy with Cryo-Ablation;  Surgeon: Kalia Sparrow MD;  [facility];  Service: Thoracic    TONSILLECTOMY         Family History   Problem Relation Age of Onset    Dementia Mother             Breast cancer Sister             Prostate cancer Brother         Received Radiation     I have reviewed and agree with the history as documented.    E-Cigarette/Vaping    E-Cigarette Use Never User      E-Cigarette/Vaping Substances    Nicotine No     THC Yes     CBD No     Flavoring No     Other No     Unknown No      Social History     Tobacco Use    Smoking status: Former     Current packs/day: 0.00     Average packs/day: 1 pack/day for 15.0 years (15.0 ttl pk-yrs)     Types: Cigarettes     Start date:      Quit date:      Years since quittin.3     Passive exposure: Never    Smokeless tobacco: Never    Tobacco comments:     i quit when i was 28. not sure of exact dates   Vaping Use    Vaping status: Never Used   Substance Use Topics    Alcohol use: Yes     Alcohol/week: 6.0 standard drinks of alcohol     Types: 6 Cans of beer per week     Comment: socially  last drink 3/23    Drug use: Yes     Types: Marijuana     Comment: gummies foro nausea with chemo       Review of Systems    Physical Exam  Physical Exam  Constitutional:       General: He is not in acute distress.     Appearance: Normal appearance. He is normal weight. He is not ill-appearing or diaphoretic.   HENT:      Head: Normocephalic and atraumatic.      Right Ear: External ear normal.      Left Ear: External ear normal.      Nose: Nose normal.      Mouth/Throat:      Mouth: Mucous membranes are moist.   Eyes:      Conjunctiva/sclera: Conjunctivae normal.   Cardiovascular:      Rate and Rhythm: Normal rate and regular rhythm.      Pulses: Normal pulses.      Heart sounds: Normal heart sounds.   Pulmonary:      Effort: Pulmonary effort is  normal. No respiratory distress.      Breath sounds: Normal breath sounds. No stridor.   Abdominal:      General: Abdomen is flat. There is no distension.      Palpations: Abdomen is soft. There is no mass.   Musculoskeletal:         General: No swelling, tenderness or deformity. Normal range of motion.      Cervical back: Normal range of motion. No tenderness.   Skin:     General: Skin is warm and dry.      Capillary Refill: Capillary refill takes 2 to 3 seconds.      Coloration: Skin is not pale.   Neurological:      General: No focal deficit present.      Mental Status: He is alert and oriented to person, place, and time. Mental status is at baseline.      Comments: 4-5 muscle strength noted in the left upper extremity and left lower extremity.  The patient does have uncontrollable twitching of the left upper extremity and left lower extremity which comes and goes.   Psychiatric:         Mood and Affect: Mood normal.         Vital Signs  ED Triage Vitals   Temperature Pulse Respirations Blood Pressure SpO2   04/20/24 1615 04/20/24 1330 04/20/24 1330 04/20/24 1330 04/20/24 1330   97.6 °F (36.4 °C) 99 18 (!) 179/88 96 %      Temp Source Heart Rate Source Patient Position - Orthostatic VS BP Location FiO2 (%)   04/20/24 1615 04/20/24 1330 04/20/24 1330 04/20/24 1330 --   Temporal Monitor Sitting Left arm       Pain Score       04/20/24 1330       3           Vitals:    04/20/24 1622 04/20/24 1715 04/20/24 1741 04/20/24 1815   BP: 128/81 130/82 130/82    Pulse: 79  79    Patient Position - Orthostatic VS:  Sitting  Sitting         Visual Acuity  Visual Acuity      Flowsheet Row Most Recent Value   L Pupil Size (mm) 2   R Pupil Size (mm) 2   L Pupil Shape Round   R Pupil Shape Round            ED Medications  Medications   amLODIPine (NORVASC) tablet 10 mg (has no administration in time range)   atorvastatin (LIPITOR) tablet 40 mg (40 mg Oral Given 4/20/24 1725)   losartan (COZAAR) tablet 50 mg (has no administration  in time range)   pantoprazole (PROTONIX) EC tablet 40 mg (has no administration in time range)   dexamethasone (DECADRON) tablet 1 mg (has no administration in time range)   acetaminophen (TYLENOL) tablet 650 mg (has no administration in time range)   ondansetron (ZOFRAN) injection 4 mg (has no administration in time range)   enoxaparin (LOVENOX) subcutaneous injection 40 mg (has no administration in time range)   LORazepam (ATIVAN) injection 1 mg (1 mg Intravenous Given 4/20/24 1347)   levETIRAcetam (KEPPRA) injection 1,000 mg (1,000 mg Intravenous Given 4/20/24 1553)       Diagnostic Studies  Results Reviewed       Procedure Component Value Units Date/Time    RBC Morphology Reflex Test [[id number]] Collected: 04/20/24 1345    Lab Status: Final result Specimen: Blood from Arm, Left Updated: 04/20/24 1501    CBC and differential [[id number]]  (Abnormal) Collected: 04/20/24 1345    Lab Status: Final result Specimen: Blood from Arm, Left Updated: 04/20/24 1422     WBC 11.63 Thousand/uL      RBC 4.83 Million/uL      Hemoglobin 15.3 g/dL      Hematocrit 46.3 %      MCV 96 fL      MCH 31.7 pg      MCHC 33.0 g/dL      RDW 15.5 %      MPV 9.4 fL      Platelets 166 Thousands/uL     Narrative:      This is an appended report.  These results have been appended to a previously verified report.    Manual Differential(PHLEBS Do Not Order) [[id number]]  (Abnormal) Collected: 04/20/24 1345    Lab Status: Final result Specimen: Blood from Arm, Left Updated: 04/20/24 1422     Segmented % 69 %      Lymphocytes % 25 %      Monocytes % 5 %      Eosinophils % 1 %      Basophils % 0 %      Absolute Neutrophils 8.02 Thousand/uL      Absolute Lymphocytes 2.91 Thousand/uL      Absolute Monocytes 0.58 Thousand/uL      Absolute Eosinophils 0.12 Thousand/uL      Absolute Basophils 0.00 Thousand/uL      Total Counted --     RBC Morphology Normal     Platelet Estimate Adequate     Giant PLTs Present    Comprehensive metabolic panel [[id number]]   (Abnormal) Collected: 04/20/24 1345    Lab Status: Final result Specimen: Blood from Arm, Left Updated: 04/20/24 1410     Sodium 141 mmol/L      Potassium 4.0 mmol/L      Chloride 102 mmol/L      CO2 23 mmol/L      ANION GAP 16 mmol/L      BUN 16 mg/dL      Creatinine 0.95 mg/dL      Glucose 142 mg/dL      Calcium 9.7 mg/dL      AST 25 U/L      ALT 27 U/L      Alkaline Phosphatase 48 U/L      Total Protein 7.3 g/dL      Albumin 4.9 g/dL      Total Bilirubin 1.09 mg/dL      eGFR 80 ml/min/1.73sq m     Narrative:      National Kid[employer] Foundation guidelines for Chronic Kidney Disease (CKD):     Stage 1 with normal or high GFR (GFR > 90 mL/min/1.73 square meters)    Stage 2 Mild CKD (GFR = 60-89 mL/min/1.73 square meters)    Stage 3A Moderate CKD (GFR = 45-59 mL/min/1.73 square meters)    Stage 3B Moderate CKD (GFR = 30-44 mL/min/1.73 square meters)    Stage 4 Severe CKD (GFR = 15-29 mL/min/1.73 square meters)    Stage 5 End Stage CKD (GFR <15 mL/min/1.73 square meters)  Note: GFR calculation is accurate only with a steady state creatinine    Phosphorus [[id number]]  (Normal) Collected: 04/20/24 1345    Lab Status: Final result Specimen: Blood from Arm, Left Updated: 04/20/24 1410     Phosphorus 3.5 mg/dL     Magnesium [[id number]]  (Normal) Collected: 04/20/24 1345    Lab Status: Final result Specimen: Blood from Arm, Left Updated: 04/20/24 1410     Magnesium 1.9 mg/dL     Protime-INR [[id number]]  (Normal) Collected: 04/20/24 1345    Lab Status: Final result Specimen: Blood from Arm, Left Updated: 04/20/24 1405     Protime 12.6 seconds      INR 0.92    APTT [[id number]]  (Abnormal) Collected: 04/20/24 1345    Lab Status: Final result Specimen: Blood from Arm, Left Updated: 04/20/24 1405     PTT 22 seconds                    CT head without contrast   Final Result by Ace Ordoñez MD (04/20 1454)      Right frontal lobe vasogenic edema again identified, known lesion better seen on recent MRI. No significant  mass effect.                  Workstation performed: [id number]         MRI brain w wo contrast    (Results Pending)              Procedures  ECG 12 Lead Documentation Only    Date/Time: 4/20/2024 1:41 PM    Performed by: Jerson Daniels Jr., DO  Authorized by: Jerson Daniels Jr., DO    ECG reviewed by me, the ED Provider: yes    Patient location:  ED  Comments:      EKG shows a sinus tachycardia at 100 beats a minute with a left axis deviation and a left anterior fascicular block pattern.  There is otherwise no other definitive acute ST or T wave changes noted.  CriticalCare Time    Date/Time: 4/20/2024 4:00 PM    Performed by: Jerson Daniels Jr., DO  Authorized by: Jerson Daniels Jr., DO    Critical care provider statement:     Critical care time (minutes):  45    Critical care time was exclusive of:  Separately billable procedures and treating other patients and teaching time    Critical care was necessary to treat or prevent imminent or life-threatening deterioration of the following conditions:  CNS failure or compromise    Critical care was time spent personally by me on the following activities:  Examination of patient, evaluation of patient's response to treatment, discussions with consultants, re-evaluation of patient's condition, review of old charts, ordering and performing treatments and interventions and obtaining history from patient or surrogate           ED Course  ED Course as of 04/20/24 2307   Sat Apr 20, 2024   1402 WBC(!): 11.63   1502 CT: Right frontal lobe vasogenic edema again identified, known lesion better seen on recent MRI. No significant mass effect.                                             Medical Decision Making  Patient is a 68-year-old male who presented to the emergency department due to progressive twitching of the left arm but to a greater extent, the left leg that has been ongoing for over 24 hours.  Patient has chronic weakness on the left side as well but this  is an acute change and a nurse who saw him today talk with him and 911 was called the patient was brought to the ER.  Patient has a history of metastatic lung cancer to the brain and had a right frontal lobe mass excision about a year ago.  The patient was on immunotherapy and also on declining dose of steroids.  The patient had MRI a few weeks ago.  Patient noted that he had occasional twitches of his fingers since his surgery but never like this.  Differential diagnosis upon my evaluation is muscle fasciculation, electrolyte abnormalities, versus simple partial seizures.  Patient's lab work is nonacute and CT scan shows vasogenic edema in the bed of the postoperative site which appears to be chronic.  The patient's symptoms have a increased risk of being secondary to partial seizures being that the patient had a surgery on the right frontal region of his brain which tends to control the left leg more than the left arm.  The patient's case was discussed with neurology who is in agreement and the patient was going to be admitted to get an MRI over the weekend as well as be loaded with Keppra.  Patient admitted.    Amount and/or Complexity of Data Reviewed  Labs: ordered. Decision-making details documented in ED Course.  Radiology: ordered.    Risk  Prescription drug management.  Decision regarding hospitalization.             Disposition  Final diagnoses:   Partial seizures (HCC)   Metastatic lung cancer (metastasis from lung to other site) (HCC)   Generalized weakness   History of CVA (cerebrovascular accident)     Time reflects when diagnosis was documented in both MDM as applicable and the Disposition within this note       Time User Action Codes Description Comment    4/20/2024  3:48 PM Jerson Daniels Add [R56.9] Partial seizures (HCC)     4/20/2024  3:48 PM Jerson Daniels Add [C34.90] Metastatic lung cancer (metastasis from lung to other site) (HCC)     4/20/2024  4:48 PM Bharti Stanton Add [R53.1]  Generalized weakness     4/20/2024  4:48 PM Bharti Stanton Add [Z86.73] History of CVA (cerebrovascular accident)           ED Disposition       ED Disposition   Admit    Condition   Stable    Date/Time   Sat Apr 20, 2024  3:49 PM    Comment   Case was discussed with Dr. Betancourt and the patient's admission status was agreed to be Admission Status: inpatient status to the service of Dr. Betancourt .               Follow-up Information    None         Current Discharge Medication List        CONTINUE these medications which have NOT CHANGED    Details   amLODIPine (NORVASC) 10 mg tablet Take 1 tablet (10 mg total) by mouth daily  Qty: 90 tablet, Refills: 0    Associated Diagnoses: Essential hypertension      atorvastatin (LIPITOR) 40 mg tablet Take 1 tablet (40 mg total) by mouth daily after dinner  Qty: 30 tablet, Refills: 0    Associated Diagnoses: Hypercholesterolemia      dexamethasone (DECADRON) 2 mg tablet Take 1 tablet (2 mg total) by mouth 2 (two) times a day with meals  Qty: 60 tablet, Refills: 0    Associated Diagnoses: Metastasis to brain (HCC)      losartan (COZAAR) 50 mg tablet Take 1 tablet (50 mg total) by mouth daily  Qty: 90 tablet, Refills: 0    Associated Diagnoses: Hypertension; Medication refill      pantoprazole (PROTONIX) 40 mg tablet Take 1 tablet (40 mg total) by mouth daily  Qty: 60 tablet, Refills: 0    Associated Diagnoses: Metastasis to brain (HCC)      gabapentin (Neurontin) 300 mg capsule Take 1 capsule (300 mg total) by mouth 3 (three) times a day  Qty: 90 capsule, Refills: 1    Associated Diagnoses: Acute post-thoracotomy pain      hydroCHLOROthiazide 25 mg tablet Take 25 mg by mouth daily             No discharge procedures on file.    PDMP Review         Value Time User    PDMP Reviewed  Yes 10/18/2023 10:02 AM Shante Connelly PA-C            ED Provider  Electronically Signed by             Jerson Daniels Jr.,   04/20/24 2769

## 2024-04-20 NOTE — ASSESSMENT & PLAN NOTE
Patient is s/p robotic converted to right thoracotomy and right upper lobectomy on 9/1/2023 for poorly differentiated adenocarcinoma per patient's records   s/p bronchoscopy and EBUS biopsy of 3 lymph nodes at [facility] on 4/16/2024  Patient is on dexamethasone in the process of being weaned   Patient will continue to follow with hematology/oncology in outpatient setting

## 2024-04-20 NOTE — ASSESSMENT & PLAN NOTE
Presented to the ED today with complaints of left arm and leg tremors  See plan for history of CVA

## 2024-04-20 NOTE — ASSESSMENT & PLAN NOTE
History of CVA with left-sided deficits  Reported to the ED today for left upper and lower extremity tremors that started on Wednesday then went away until returning at 1 PM today  Left upper and lower extremity weakness on exam, worse than baseline per patient  Head CT negative  ED physician spoke with neurology who recommended admit for MRI and antiepileptics  MRI brain pending  Monitor telemetry  Received Keppra 1000 mg IV x 1 in the ED  Neurological checks

## 2024-04-20 NOTE — ASSESSMENT & PLAN NOTE
S/p robotic converted to right thoracotomy and right upper lobectomy on 9/1/2023  Received chemotherapy/immunotherapy and surgical resection of brain lesion followed by [facility]  PET scan 3/25/2024: FDG-avid mediastinal lymphadenopathy as seen on recent CT compatible with metastasis.  4/16/24 had bronchoscopy and EBUS biopsy of 3 lymph nodes at [facility]  Will continue to follow with hematology/oncology in outpatient setting

## 2024-04-20 NOTE — ASSESSMENT & PLAN NOTE
Presented to the ED with complaints of generalized weakness  Also with new onset left upper and lower extremity tremors  Head CT: Right frontal lobe vasogenic edema again identified, known lesion better seen on recent MRI. No significant mass effect.  ED physician discussed with neurologist who recommended admit for MRI brain and antiepileptics  Will obtain MRI brain  Received Keppra 1000 mg IV x 1 in the ED  Consult PT OT  Fall precautions

## 2024-04-21 ENCOUNTER — APPOINTMENT (INPATIENT)
Dept: MRI IMAGING | Facility: HOSPITAL | Age: 69
DRG: 091 | End: 2024-04-21
Payer: COMMERCIAL

## 2024-04-21 LAB
ANION GAP SERPL CALCULATED.3IONS-SCNC: 8 MMOL/L (ref 4–13)
ATRIAL RATE: 100 BPM
BUN SERPL-MCNC: 12 MG/DL (ref 5–25)
CALCIUM SERPL-MCNC: 9 MG/DL (ref 8.4–10.2)
CHLORIDE SERPL-SCNC: 105 MMOL/L (ref 96–108)
CO2 SERPL-SCNC: 28 MMOL/L (ref 21–32)
CREAT SERPL-MCNC: 0.76 MG/DL (ref 0.6–1.3)
ERYTHROCYTE [DISTWIDTH] IN BLOOD BY AUTOMATED COUNT: 15.5 % (ref 11.6–15.1)
GFR SERPL CREATININE-BSD FRML MDRD: 92 ML/MIN/1.73SQ M
GLUCOSE P FAST SERPL-MCNC: 108 MG/DL (ref 65–99)
GLUCOSE SERPL-MCNC: 108 MG/DL (ref 65–140)
HCT VFR BLD AUTO: 40.5 % (ref 36.5–49.3)
HGB BLD-MCNC: 13.6 G/DL (ref 12–17)
MCH RBC QN AUTO: 31.7 PG (ref 26.8–34.3)
MCHC RBC AUTO-ENTMCNC: 33.6 G/DL (ref 31.4–37.4)
MCV RBC AUTO: 94 FL (ref 82–98)
P AXIS: 49 DEGREES
PLATELET # BLD AUTO: 124 THOUSANDS/UL (ref 149–390)
PMV BLD AUTO: 9.2 FL (ref 8.9–12.7)
POTASSIUM SERPL-SCNC: 3.4 MMOL/L (ref 3.5–5.3)
PR INTERVAL: 164 MS
QRS AXIS: -19 DEGREES
QRSD INTERVAL: 76 MS
QT INTERVAL: 332 MS
QTC INTERVAL: 428 MS
RBC # BLD AUTO: 4.29 MILLION/UL (ref 3.88–5.62)
SODIUM SERPL-SCNC: 141 MMOL/L (ref 135–147)
T WAVE AXIS: 81 DEGREES
VENTRICULAR RATE: 100 BPM
WBC # BLD AUTO: 7.92 THOUSAND/UL (ref 4.31–10.16)

## 2024-04-21 PROCEDURE — 80048 BASIC METABOLIC PNL TOTAL CA: CPT

## 2024-04-21 PROCEDURE — A9585 GADOBUTROL INJECTION: HCPCS | Performed by: HOSPITALIST

## 2024-04-21 PROCEDURE — G0427 INPT/ED TELECONSULT70: HCPCS | Performed by: PSYCHIATRY & NEUROLOGY

## 2024-04-21 PROCEDURE — 93010 ELECTROCARDIOGRAM REPORT: CPT | Performed by: INTERNAL MEDICINE

## 2024-04-21 PROCEDURE — 70553 MRI BRAIN STEM W/O & W/DYE: CPT

## 2024-04-21 PROCEDURE — 99232 SBSQ HOSP IP/OBS MODERATE 35: CPT | Performed by: HOSPITALIST

## 2024-04-21 PROCEDURE — 85027 COMPLETE CBC AUTOMATED: CPT

## 2024-04-21 RX ORDER — PRAMIPEXOLE DIHYDROCHLORIDE 0.12 MG/1
0.25 TABLET ORAL 3 TIMES DAILY
Status: DISCONTINUED | OUTPATIENT
Start: 2024-04-21 | End: 2024-04-22 | Stop reason: HOSPADM

## 2024-04-21 RX ORDER — LEVETIRACETAM 500 MG/5ML
1000 INJECTION, SOLUTION, CONCENTRATE INTRAVENOUS EVERY 12 HOURS SCHEDULED
Status: DISCONTINUED | OUTPATIENT
Start: 2024-04-21 | End: 2024-04-21

## 2024-04-21 RX ORDER — GADOBUTROL 604.72 MG/ML
8 INJECTION INTRAVENOUS
Status: COMPLETED | OUTPATIENT
Start: 2024-04-21 | End: 2024-04-21

## 2024-04-21 RX ORDER — LEVETIRACETAM 500 MG/5ML
2000 INJECTION, SOLUTION, CONCENTRATE INTRAVENOUS ONCE
Status: COMPLETED | OUTPATIENT
Start: 2024-04-21 | End: 2024-04-21

## 2024-04-21 RX ORDER — LORAZEPAM 2 MG/ML
1 INJECTION INTRAMUSCULAR EVERY 8 HOURS SCHEDULED
Status: DISCONTINUED | OUTPATIENT
Start: 2024-04-21 | End: 2024-04-22

## 2024-04-21 RX ORDER — POTASSIUM CHLORIDE 20 MEQ/1
40 TABLET, EXTENDED RELEASE ORAL 2 TIMES DAILY
Status: COMPLETED | OUTPATIENT
Start: 2024-04-21 | End: 2024-04-21

## 2024-04-21 RX ORDER — LEVETIRACETAM 500 MG/5ML
2000 INJECTION, SOLUTION, CONCENTRATE INTRAVENOUS EVERY 12 HOURS SCHEDULED
Status: DISCONTINUED | OUTPATIENT
Start: 2024-04-21 | End: 2024-04-22 | Stop reason: HOSPADM

## 2024-04-21 RX ADMIN — GADOBUTROL 8 ML: 604.72 INJECTION INTRAVENOUS at 12:45

## 2024-04-21 RX ADMIN — POTASSIUM CHLORIDE 40 MEQ: 1500 TABLET, EXTENDED RELEASE ORAL at 17:15

## 2024-04-21 RX ADMIN — PRAMIPEXOLE DIHYDROCHLORIDE 0.25 MG: 0.12 TABLET ORAL at 21:48

## 2024-04-21 RX ADMIN — ENOXAPARIN SODIUM 40 MG: 40 INJECTION SUBCUTANEOUS at 08:30

## 2024-04-21 RX ADMIN — LEVETIRACETAM 2000 MG: 100 INJECTION, SOLUTION INTRAVENOUS at 21:47

## 2024-04-21 RX ADMIN — PRAMIPEXOLE DIHYDROCHLORIDE 0.25 MG: 0.12 TABLET ORAL at 17:15

## 2024-04-21 RX ADMIN — DEXAMETHASONE 1 MG: 0.5 TABLET ORAL at 08:33

## 2024-04-21 RX ADMIN — LORAZEPAM 1 MG: 2 INJECTION INTRAMUSCULAR; INTRAVENOUS at 11:40

## 2024-04-21 RX ADMIN — ATORVASTATIN CALCIUM 40 MG: 40 TABLET, FILM COATED ORAL at 17:15

## 2024-04-21 RX ADMIN — LORAZEPAM 1 MG: 2 INJECTION INTRAMUSCULAR; INTRAVENOUS at 21:47

## 2024-04-21 RX ADMIN — POTASSIUM CHLORIDE 40 MEQ: 1500 TABLET, EXTENDED RELEASE ORAL at 10:21

## 2024-04-21 RX ADMIN — ACETAMINOPHEN 650 MG: 325 TABLET ORAL at 11:40

## 2024-04-21 RX ADMIN — PANTOPRAZOLE SODIUM 40 MG: 40 TABLET, DELAYED RELEASE ORAL at 08:30

## 2024-04-21 RX ADMIN — PRAMIPEXOLE DIHYDROCHLORIDE 0.25 MG: 0.12 TABLET ORAL at 06:09

## 2024-04-21 RX ADMIN — LEVETIRACETAM 1000 MG: 100 INJECTION INTRAVENOUS at 10:21

## 2024-04-21 RX ADMIN — LEVETIRACETAM 2000 MG: 100 INJECTION INTRAVENOUS at 11:50

## 2024-04-21 RX ADMIN — LOSARTAN POTASSIUM 50 MG: 50 TABLET, FILM COATED ORAL at 08:30

## 2024-04-21 RX ADMIN — AMLODIPINE BESYLATE 10 MG: 10 TABLET ORAL at 08:30

## 2024-04-21 NOTE — ASSESSMENT & PLAN NOTE
Will obtain a PT and OT evaluation  Multifactorial-secondary to his advancing age, cancer, and other medical comorbid condition

## 2024-04-21 NOTE — PLAN OF CARE
Problem: Knowledge Deficit  Goal: Patient/family/caregiver demonstrates understanding of disease process, treatment plan, medications, and discharge instructions  Description: Complete learning assessment and assess knowledge base.  Interventions:  - Provide teaching at level of understanding  - Provide teaching via preferred learning methods  Outcome: Progressing     Problem: INFECTION - ADULT  Goal: Absence or prevention of progression during hospitalization  Description: INTERVENTIONS:  - Assess and monitor for signs and symptoms of infection  - Monitor lab/diagnostic results  - Monitor all insertion sites, i.e. indwelling lines, tubes, and drains  - Monitor endotracheal if appropriate and nasal secretions for changes in amount and color  - [place] appropriate cooling/warming therapies per order  - Administer medications as ordered  - Instruct and encourage patient and family to use good hand hygiene technique  - Identify and instruct in appropriate isolation precautions for identified infection/condition  Outcome: Progressing  Goal: Absence of fever/infection during neutropenic period  Description: INTERVENTIONS:  - Monitor WBC    Outcome: Progressing

## 2024-04-21 NOTE — ASSESSMENT & PLAN NOTE
S/p robotic converted to right thoracotomy and right upper lobectomy on 9/1/2023  With metastatic disease to the brain-continue dexamethasone  Received chemotherapy/immunotherapy and surgical resection of brain lesion followed by [facility]  PET scan 3/25/2024: FDG-avid mediastinal lymphadenopathy as seen on recent CT compatible with metastasis.  4/16/24 had bronchoscopy and EBUS biopsy of 3 lymph nodes at [facility]  MRI brain pending today  Will continue to follow with hematology/oncology in outpatient setting

## 2024-04-21 NOTE — ASSESSMENT & PLAN NOTE
Patient has a history of a CVA with long-term sequelae of left-sided weakness  Continue Lipitor for secondary prevention for now

## 2024-04-21 NOTE — ASSESSMENT & PLAN NOTE
Unspecified exact etiology  Patient continues to have ongoing tremors specifically more so of the left lower extremity  Differential includes partial seizure disorder versus benign essential tremors versus seizures related to metastatic lung cancer to the brain  MRI brain has been ordered-this should be completed at approximately 12 noon today  Patient was given a Keppra load yesterday-1000 mg in the ED at time of arrival  Will continue Keppra 1000 mg IV twice daily for now  Continue dexamethasone  Question the need to add a second agent or further from a control  Formal neurology consultation is pending  We will add a PT and OT eval  Since the patient will be here greater than 2 midnights to complete this workup, I will be switching him from the observation to the inpatient classification.

## 2024-04-21 NOTE — TELEMEDICINE
TeleConsultation - Neurology   Paras Pitts 68 y.o. male MRN: [id number]  Unit/Bed#: -01 Encounter: [id number]        REQUIRED DOCUMENTATION:     1. This service was provided via Telemedicine.  2. Provider located at [facility].  3. TeleMed provider: Teddy Salcedo MD.  4. Identify all parties in room with patient during tele consult:  wife  5.Patient was then informed that this was a Telemedicine visit and that the exam was being conducted confidentially over secure lines. My office door was closed. No one else was in the room.  Patient acknowledged consent and understanding of privacy and security of the Telemedicine visit, and gave us permission to have the assistant stay in the room in order to assist with the history and to conduct the exam.  I informed the patient that I have reviewed their record in Epic and presented the opportunity for them to ask any questions regarding the visit today.  The patient agreed to participate.             Assessment/Plan   EPC. (Epilepsia Partialis Continua).  New onset seizure activity.  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  No clear-cut toxic metabolic derangements, new medications, change in his daily routine or sleep-wake cycle, nor recent infectious symptoms that could be lowering the seizure threshold further.      Tumor itself was fully resected in March of last year at [facility].  No evidence of recurrence thus far.  Some improvement in involuntary left-sided activity compared to yesterday however still continuous every couple seconds in the left lower extremity mild there was a brief period of 2 to 3 hours yesterday per patient.  Symptoms had resolved may have been from a combination of Ativan that he had yesterday afternoon at 1:30 PM and Keppra in the system.    Case discussed with on-call epilepsy team as well there is room to go up with the Keppra and will add Ativan.    He also has significant weakness in the left upper and lower extremities more so  on the left lower extremity compared to the left upper extremity.  Also has subjective numbness in both the left upper and left lower extremities more so than the left side.    Appears quite a patient that the strength in the left upper extremity is improving more so than in the left lower extremity as he could barely lift the left lower extremity of the bed with great effort and to the left upper extremity could raise it up to little bit above his shoulder height level.    Head CT yesterday in ER showing right frontal lobe vasogenic edema again identified with no significant mass effect or evidence of any hemorrhage.  Postoperative changes right frontal region again noted.      PLAN:   MRI BRAIN w/wo contras pending.  If MRI brain scan looks worse compared to the March 26 scans then would recommend consulting oncology.    And is to give 2 g IV Keppra load now increase his maintenance dosing of Keppra from 1 g twice a day to 2 g IV twice a day.  The Keppra was initiated yesterday he was not on any AEDs at baseline.    Ativan 1 mg Iv q8 hours    SEIZURE PRECAUTIONS  Continue evaluating for toxic/metabolic derangments    If no resolution of seizure activity by tomorrow patient will likely need second AED  Contact tele neurology team tomorrow if this is the case.  At present time no clinical utility in transfer for video eeg monitoring as focal motor seizures may not have any obvious eeg change thus clinical symptoms are a better gauge of treatment success. No clinical concern for subclinical seizures at this stage.    History of Present Illness     Reason for Consult / Principal Problem: evaluate for seizures    HPI: Paras Pitts is a 68 y.o. right handed male s/p rt hemispheric brain tumor resection for lung cancer mets March of last year at [facility], had gone through chemo radiation and currently on immunotherapy and no prior seizures hx and not on baseline AED who presents yesterday to [facility] with  uncontrollable involuntary movements of the left upper and left lower extremities.  Described as twitching.  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 He says it was rapid movements and he fell out of the seat. Nurse walked into his gallery and saw the involuntary activity and called EMS.  He was fine when he woke up yesterday.  He states the shaking was so intense that it caused his chest to tighten up and he couldn't breath.    4 days ago in the evening when he was sitting in his lazy boy chair he had brief involuntary movements of the lle lasting a couple min and minimal in the lue as well. His wife says she was focused on his lle and didn't pay attention to his lue and massaged the lle. Everything resolved in a couple min and nothing suspicion until the full blown  involuntary activity that started yesterday afternoon in his gallery.    According to wife and patient he has residual mild lue/lle numbness from a stroke years ago. They states that he had been getting once a month immunotherapy infusions however 3 weeks after his January 2024 session he began to get weak on lue/lle and baseline numbness from stroke worsened. He was put on a short course of steroids and weakness resolved and went back to baseline numbness. Then again had immunotherapy session end of Feb and same situation happened 3 weeks later, started on steroids again and still tapering down close to finishing taper. Has had trace lue/lle weakness and left sided numbness almost back to baseline until yesterday's involuntary activity starting in the afternoon. LUE/LLE both weak now and more numb than baseline, worse in LLE compared to LUE. Both improving however LUE improving faster than LLE.    History of lung cancer mets to the brain right hemisphere surgically removed last summer.  He has been chronic steroids and  "slowly decreasing his dose also on immunotherapy.  He has a prior history of stroke that affected his left side has chronic weakness.      Mri brain with and without Contrast on 3/26 compared to January 18 of this year.  \"-Status post treatment resection and stereotactic radiosurgery of right frontal mass. Continued increase in enhancement and vasogenic edema around the resection cavity. Question mild restricted diffusion and increased cerebral blood volume along the   superior margin however no definite increased ASL signal. Differential includes posttreatment changes versus recurrent disease. Continued attention on follow-up.     -New 1.5 mm leftward subfalcine herniation.\"       Inpatient consult to Neurology  Consult performed by: Teddy Salcedo MD  Consult ordered by: GRACE Espinal         Review of Systems  Per 12 point review in addition to hpi mild current bifrontal pressure HA no migrainous features, rest negative  Historical Information   Past Medical History:   Diagnosis Date    Brain compression (HCC) 03/20/2023    Brain mass 03/20/2023    Cancer (HCC) 2001    Receint lung and brain lesions and prostate cancer in 2001    Cerebral edema (HCC) 03/20/2023    CVA (cerebral vascular accident) (HCC) 08/12/2021    Hypertension     Prostate cancer (HCC) 2001    Rectal bleeding     Stroke (HCC)     2011       Past Surgical History:   Procedure Laterality Date    COLONOSCOPY      CRANIOTOMY Right 3/23/2023    Procedure: Right frontal CRANIOTOMY IMAGE-GUIDED FOR TUMOR;  Surgeon: Clark Carrillo MD;  [facility]: [facility];  Service: Neurosurgery    ENDOBRONCHIAL ULTRASOUND (EBUS) N/A 4/16/2024    Procedure: ENDOBRONCHIAL ULTRASOUND (EBUS);  Surgeon: Kalia Sparrow MD;  Location: [facility];  Service: Thoracic    IR PORT PLACEMENT  4/27/2023    DC BRNCC INCL FLUOR GDNCE DX W/CELL WASHG SPX N/A 9/1/2023    Procedure: BRONCHOSCOPY FLEXIBLE;  Surgeon: Kalia Sparrow MD;  Location: [facility];  " "Service: Thoracic    AZ [facility] INCL FLUOR GDNCE DX W/CELL WASHG SPX N/A 2024    Procedure: BRONCHOSCOPY FLEXIBLE;  Surgeon: Kalia Sparrow MD;  [facility];  Service: Thoracic    AZ CYSTOURETHROSCOPY N/A 3/23/2023    Procedure: EUA, DEL CASTILLO INSERTION;  Surgeon: Ajay Piedra MD;  [facility];  Service: Urology    AZ THORACOSCOPY W/LOBECTOMY SINGLE LOBE Right 2023    Procedure: robotic assisted converted to open right upper lobectomy, lymph node dissection;  Surgeon: Kalia Sparrow MD;  [facility];  Service: Thoracic    PROSTATECTOMY  2001    THORACOTOMY Right 2023    Procedure: right thoracotomy with Cryo-Ablation;  Surgeon: Kalia Sparrow MD;  [facility]: [facility];  Service: Thoracic    TONSILLECTOMY       Social History   Social History     Substance and Sexual Activity   Alcohol Use Yes    Alcohol/week: 6.0 standard drinks of alcohol    Types: 6 Cans of beer per week    Comment: socially  last drink 3/23     Social History     Substance and Sexual Activity   Drug Use Yes    Types: Marijuana    Comment: gummies foro nausea with chemo     E-Cigarette/Vaping    E-Cigarette Use Never User      E-Cigarette/Vaping Substances    Nicotine No     THC Yes     CBD No     Flavoring No     Other No     Unknown No      Social History     Tobacco Use   Smoking Status Former    Current packs/day: 0.00    Average packs/day: 1 pack/day for 15.0 years (15.0 ttl pk-yrs)    Types: Cigarettes    Start date:     Quit date:     Years since quittin.3    Passive exposure: Never   Smokeless Tobacco Never   Tobacco Comments    i quit when i was 28. not sure of exact dates     Family History: non-contributory        Meds/Allergies   all current active meds have been reviewed    No Known Allergies    Objective   Vitals:Blood pressure 144/94, pulse 67, temperature (!) 97.2 °F (36.2 °C), temperature source Oral, resp. rate 20, height 5' 9\" (1.753 m), weight 88.5 kg (195 lb 1.7 oz), " SpO2 97%.,Body mass index is 28.81 kg/m².    Intake/Output Summary (Last 24 hours) at 4/21/2024 1004  Last data filed at 4/21/2024 0700  Gross per 24 hour   Intake --   Output 2100 ml   Net -2100 ml         Physical Exam  General:  no visible distress. Lle>lue involuntary movements noted at baseline.  Extremities: no visible deformities    Neurologic Exam  MS: Not oriented x 3.  Insight attention concentration intact.  No expressive or receptive aphasia.  Able to state 7 quarters in a $1.75.  Able to perform multistep command taking his right thumb to left ear to nose.  CN: 2-12 intact.  No facial asymmetry.  No involuntary movements in the face.  No numbness V1 V3 bilaterally.  Motor: Moderate amplitude and frequency left lower extremity movements with some stiffening in the foot.  Very low frequency and amplitude involuntary movements in the left hand fingers which are very minimally noticeable.  Able to elevate the right upper and right lower extremities antigravity at least 3 power.  With the left upper extremity with great deal of effort can get the extremity slightly above shoulder height.  Great deal of effort able to lift left lower extremity off the ground couple inches only.  Additional practitioner not present to assess the individual muscle strength.  Sensory: Light touch intact upper and lower extremities bilaterally.  Appears numbness in the left side is subjective and not noticed objectively.  Coordination: Right upper extremity right lower extremity finger-nose heel-to-shin testing intact.  With left upper extremity dysmetria due to weakness however still able to touch target.  Unable to move the left lower extremity heel onto the right shin for heel to shin testing.      Lab Results: I have personally reviewed pertinent reports.    Imaging Studies: I have personally reviewed pertinent films in PACS  EKG, Pathology, and Other Studies: I have personally reviewed pertinent films in PACS

## 2024-04-21 NOTE — PROGRESS NOTES
[facility]  Progress Note  Name: Paras Pitts I  MRN: [id number]  Unit/Bed#: -01 I Date of Admission: 4/20/2024   Date of Service: 4/21/2024 I Hospital Day: 0    Assessment/Plan   * Tremors of nervous system  Assessment & Plan  Unspecified exact etiology  Patient continues to have ongoing tremors specifically more so of the left lower extremity  Differential includes partial seizure disorder versus benign essential tremors versus seizures related to metastatic lung cancer to the brain  MRI brain has been ordered-this should be completed at approximately 12 noon today  Patient was given a Keppra load yesterday-1000 mg in the ED at time of arrival  Will continue Keppra 1000 mg IV twice daily for now  Continue dexamethasone  Question the need to add a second agent or further from a control  Formal neurology consultation is pending  We will add a PT and OT eval  Since the patient will be here greater than 2 midnights to complete this workup, I will be switching him from the observation to the inpatient classification.    Carcinoma of right lung (HCC)  Assessment & Plan  Patient is s/p robotic converted to right thoracotomy and right upper lobectomy on 9/1/2023 for poorly differentiated adenocarcinoma per patient's records   s/p bronchoscopy and EBUS biopsy of 3 lymph nodes at [facility] on 4/16/2024  Patient is on dexamethasone in the process of being weaned   Patient will continue to follow with hematology/oncology in outpatient setting    Metastatic adenocarcinoma (HCC)  Assessment & Plan  S/p robotic converted to right thoracotomy and right upper lobectomy on 9/1/2023  With metastatic disease to the brain-continue dexamethasone  Received chemotherapy/immunotherapy and surgical resection of brain lesion followed by [facility]  PET scan 3/25/2024: FDG-avid mediastinal lymphadenopathy as seen on recent CT compatible with metastasis.  4/16/24 had bronchoscopy and EBUS biopsy of 3 lymph nodes at [facility]  MRI  brain pending today  Will continue to follow with hematology/oncology in outpatient setting    History of CVA (cerebrovascular accident)  Assessment & Plan  Patient has a history of a CVA with long-term sequelae of left-sided weakness  Continue Lipitor for secondary prevention for now    Essential hypertension  Assessment & Plan  Pressure stable  Continue amlodipine and losartan    Hypercholesterolemia  Assessment & Plan  Continue prehospital statin    Generalized weakness  Assessment & Plan  Will obtain a PT and OT evaluation  Multifactorial-secondary to his advancing age, cancer, and other medical comorbid condition               VTE Pharmacologic Prophylaxis: VTE Score: 8 High Risk (Score >/= 5) - Pharmacological DVT Prophylaxis Ordered: enoxaparin (Lovenox). Sequential Compression Devices Ordered.    Mobility:   Basic Mobility Inpatient Raw Score: 14  JH-HLM Goal: 4: Move to chair/commode  JH-HLM Achieved: 5: Stand (1 or more minutes)  JH-HLM Goal achieved. Continue to encourage appropriate mobility.    Patient Centered Rounds: I performed bedside rounds with nursing staff today.   Discussions with Specialists or Other Care Team Provider: Neurology      Education and Discussions with Family / Patient: Patient declined call to .  -I offered to call the patient's wife patient reported he will update himself    Total Time Spent on Date of Encounter in care of patient: 35 mins. This time was spent on one or more of the following: performing physical exam; counseling and coordination of care; obtaining or reviewing history; documenting in the medical record; reviewing/ordering tests, medications or procedures; communicating with other healthcare professionals and discussing with patient's family/caregivers.    Current Length of Stay: 0 day(s)  Current Patient Status: Inpatient   Certification Statement: The patient, admitted on an observation basis, will now require > 2 midnight hospital stay due to  ongoing seizure-like activity, need for a formal GI eval patient with a PT and OT eval  Discharge Plan: Anticipate discharge in 24-48 hrs to discharge location to be determined pending rehab evaluations.    Code Status: Level 1 - Full Code    Subjective:   Patient seen, patient complaining of increasing left lower extremity tremors.    Objective:     Vitals:   Temp (24hrs), Av.7 °F (36.5 °C), Min:97.2 °F (36.2 °C), Max:98.1 °F (36.7 °C)    Temp:  [97.2 °F (36.2 °C)-98.1 °F (36.7 °C)] 97.2 °F (36.2 °C)  HR:  [67-99] 67  Resp:  [16-22] 20  BP: (104-179)/(57-94) 144/94  SpO2:  [90 %-97 %] 97 %  Body mass index is 28.81 kg/m².     Input and Output Summary (last 24 hours):     Intake/Output Summary (Last 24 hours) at 2024 0935  Last data filed at 2024 0700  Gross per 24 hour   Intake --   Output 2100 ml   Net -2100 ml       Physical Exam:   Physical Exam  Vitals and nursing note reviewed.   Constitutional:       General: He is not in acute distress.     Appearance: Normal appearance. He is not ill-appearing.   HENT:      Head: Normocephalic and atraumatic.      Nose: Nose normal.   Eyes:      Extraocular Movements: Extraocular movements intact.      Pupils: Pupils are equal, round, and reactive to light.   Cardiovascular:      Rate and Rhythm: Normal rate and regular rhythm.      Pulses: Normal pulses.      Heart sounds: Normal heart sounds. No murmur heard.     No friction rub. No gallop.   Pulmonary:      Effort: Pulmonary effort is normal.      Breath sounds: Normal breath sounds.   Abdominal:      General: There is no distension.      Palpations: Abdomen is soft. There is no mass.      Tenderness: There is no abdominal tenderness. There is no guarding or rebound.   Musculoskeletal:         General: No swelling or tenderness. Normal range of motion.      Cervical back: Normal range of motion and neck supple. No rigidity. No muscular tenderness.      Right lower leg: No edema.      Left lower leg: No  edema.   Skin:     General: Skin is warm.      Capillary Refill: Capillary refill takes less than 2 seconds.      Findings: No erythema or rash.   Neurological:      General: No focal deficit present.      Mental Status: He is alert and oriented to person, place, and time. Mental status is at baseline.      Comments: Left upper extremity 4 out of 5 motor  Left lower extremity 3 out of 5 motor  Significant tremors noted of the left lower extremity  Otherwise AAOx3   Psychiatric:         Mood and Affect: Mood normal.         Behavior: Behavior normal.          Additional Data:     Labs:  Results from last 7 days   Lab Units 04/21/24  0426 04/20/24  1345   WBC Thousand/uL 7.92 11.63*   HEMOGLOBIN g/dL 13.6 15.3   HEMATOCRIT % 40.5 46.3   PLATELETS Thousands/uL 124* 166   LYMPHO PCT %  --  25   MONO PCT %  --  5   EOS PCT %  --  1     Results from last 7 days   Lab Units 04/21/24  0426 04/20/24  1345   SODIUM mmol/L 141 141   POTASSIUM mmol/L 3.4* 4.0   CHLORIDE mmol/L 105 102   CO2 mmol/L 28 23   BUN mg/dL 12 16   CREATININE mg/dL 0.76 0.95   ANION GAP mmol/L 8 16*   CALCIUM mg/dL 9.0 9.7   ALBUMIN g/dL  --  4.9   TOTAL BILIRUBIN mg/dL  --  1.09*   ALK PHOS U/L  --  48   ALT U/L  --  27   AST U/L  --  25   GLUCOSE RANDOM mg/dL 108 142*     Results from last 7 days   Lab Units 04/20/24  1345   INR  0.92                   Lines/Drains:  Invasive Devices       Central Venous Catheter Line  Duration             Port A Cath 04/27/23 Right Subclavian 359 days              Peripheral Intravenous Line  Duration             Peripheral IV 04/20/24 Left Antecubital <1 day                    Central Line:  Goal for removal: Port accessed. Will de-access as appropriate.         Telemetry:  Telemetry Orders (From admission, onward)               24 Hour Telemetry Monitoring  Continuous x 24 Hours (Telem)        Question:  Reason for 24 Hour Telemetry  Answer:  TIA/Suspected CVA/ Confirmed CVA                     Telemetry Reviewed:  Normal Sinus Rhythm  Indication for Continued Telemetry Use: No indication for continued use. Will discontinue.              Imaging: No pertinent imaging reviewed.    Recent Cultures (last 7 days):         Last 24 Hours Medication List:   Current Facility-Administered Medications   Medication Dose Route Frequency Provider Last Rate    acetaminophen  650 mg Oral Q6H PRN Bharti Maximino, CRNP      amLODIPine  10 mg Oral Daily Bharti Paulugh, CRNP      atorvastatin  40 mg Oral After Dinner Bharticynthia Stanton, CRNP      dexamethasone  1 mg Oral Daily Bharti Israelbaugh, CRNP      enoxaparin  40 mg Subcutaneous Daily Bharti Maximino, CRNP      levETIRAcetam  1,000 mg Intravenous Q12H LEN Dom Betancourt MD      losartan  50 mg Oral Daily Bharticynthia Stanton, CRNP      ondansetron  4 mg Intravenous Q6H PRN Bharticynthia Stanton, CRNP      pantoprazole  40 mg Oral Daily Bharti Maximino, CRNP      pramipexole  0.25 mg Oral TID Mey Omalley PA-C          Today, Patient Was Seen By: Dom Betancourt MD    **Please Note: This note may have been constructed using a voice recognition system.**

## 2024-04-22 ENCOUNTER — HOSPITAL ENCOUNTER (INPATIENT)
Facility: HOSPITAL | Age: 69
LOS: 9 days | DRG: 100 | End: 2024-05-01
Attending: FAMILY MEDICINE | Admitting: FAMILY MEDICINE
Payer: COMMERCIAL

## 2024-04-22 ENCOUNTER — APPOINTMENT (INPATIENT)
Dept: NEUROLOGY | Facility: CLINIC | Age: 69
DRG: 100 | End: 2024-04-22
Payer: COMMERCIAL

## 2024-04-22 VITALS
HEIGHT: 69 IN | TEMPERATURE: 98 F | BODY MASS INDEX: 28.9 KG/M2 | RESPIRATION RATE: 20 BRPM | DIASTOLIC BLOOD PRESSURE: 98 MMHG | OXYGEN SATURATION: 95 % | SYSTOLIC BLOOD PRESSURE: 146 MMHG | WEIGHT: 195.11 LBS | HEART RATE: 94 BPM

## 2024-04-22 DIAGNOSIS — K59.01 CONSTIPATION BY DELAYED COLONIC TRANSIT: ICD-10-CM

## 2024-04-22 DIAGNOSIS — R25.1 TREMORS OF NERVOUS SYSTEM: Primary | ICD-10-CM

## 2024-04-22 LAB
ANION GAP SERPL CALCULATED.3IONS-SCNC: 7 MMOL/L (ref 4–13)
BASOPHILS # BLD AUTO: 0.05 THOUSANDS/ÂΜL (ref 0–0.1)
BASOPHILS NFR BLD AUTO: 1 % (ref 0–1)
BUN SERPL-MCNC: 13 MG/DL (ref 5–25)
CALCIUM SERPL-MCNC: 9 MG/DL (ref 8.4–10.2)
CHLORIDE SERPL-SCNC: 104 MMOL/L (ref 96–108)
CO2 SERPL-SCNC: 28 MMOL/L (ref 21–32)
CREAT SERPL-MCNC: 0.8 MG/DL (ref 0.6–1.3)
EOSINOPHIL # BLD AUTO: 0.15 THOUSAND/ÂΜL (ref 0–0.61)
EOSINOPHIL NFR BLD AUTO: 2 % (ref 0–6)
ERYTHROCYTE [DISTWIDTH] IN BLOOD BY AUTOMATED COUNT: 15.3 % (ref 11.6–15.1)
GFR SERPL CREATININE-BSD FRML MDRD: 90 ML/MIN/1.73SQ M
GLUCOSE SERPL-MCNC: 116 MG/DL (ref 65–140)
HCT VFR BLD AUTO: 42.3 % (ref 36.5–49.3)
HGB BLD-MCNC: 13.9 G/DL (ref 12–17)
IMM GRANULOCYTES # BLD AUTO: 0.13 THOUSAND/UL (ref 0–0.2)
IMM GRANULOCYTES NFR BLD AUTO: 2 % (ref 0–2)
LYMPHOCYTES # BLD AUTO: 1.78 THOUSANDS/ÂΜL (ref 0.6–4.47)
LYMPHOCYTES NFR BLD AUTO: 24 % (ref 14–44)
MCH RBC QN AUTO: 30.8 PG (ref 26.8–34.3)
MCHC RBC AUTO-ENTMCNC: 32.9 G/DL (ref 31.4–37.4)
MCV RBC AUTO: 94 FL (ref 82–98)
MONOCYTES # BLD AUTO: 0.47 THOUSAND/ÂΜL (ref 0.17–1.22)
MONOCYTES NFR BLD AUTO: 6 % (ref 4–12)
NEUTROPHILS # BLD AUTO: 4.76 THOUSANDS/ÂΜL (ref 1.85–7.62)
NEUTS SEG NFR BLD AUTO: 65 % (ref 43–75)
NRBC BLD AUTO-RTO: 0 /100 WBCS
PLATELET # BLD AUTO: 128 THOUSANDS/UL (ref 149–390)
PMV BLD AUTO: 9.3 FL (ref 8.9–12.7)
POTASSIUM SERPL-SCNC: 3.7 MMOL/L (ref 3.5–5.3)
RBC # BLD AUTO: 4.51 MILLION/UL (ref 3.88–5.62)
SODIUM SERPL-SCNC: 139 MMOL/L (ref 135–147)
WBC # BLD AUTO: 7.34 THOUSAND/UL (ref 4.31–10.16)

## 2024-04-22 PROCEDURE — C9254 INJECTION, LACOSAMIDE: HCPCS | Performed by: PHYSICIAN ASSISTANT

## 2024-04-22 PROCEDURE — 85025 COMPLETE CBC W/AUTO DIFF WBC: CPT | Performed by: HOSPITALIST

## 2024-04-22 PROCEDURE — NC001 PR NO CHARGE: Performed by: PHYSICIAN ASSISTANT

## 2024-04-22 PROCEDURE — 97163 PT EVAL HIGH COMPLEX 45 MIN: CPT

## 2024-04-22 PROCEDURE — 95715 VEEG EA 12-26HR INTMT MNTR: CPT

## 2024-04-22 PROCEDURE — 99449 NTRPROF PH1/NTRNET/EHR 31/>: CPT | Performed by: STUDENT IN AN ORGANIZED HEALTH CARE EDUCATION/TRAINING PROGRAM

## 2024-04-22 PROCEDURE — 97167 OT EVAL HIGH COMPLEX 60 MIN: CPT

## 2024-04-22 PROCEDURE — 99223 1ST HOSP IP/OBS HIGH 75: CPT | Performed by: FAMILY MEDICINE

## 2024-04-22 PROCEDURE — 99239 HOSP IP/OBS DSCHRG MGMT >30: CPT | Performed by: PHYSICIAN ASSISTANT

## 2024-04-22 PROCEDURE — G0408 INPT/TELE FOLLOW UP 35: HCPCS | Performed by: PSYCHIATRY & NEUROLOGY

## 2024-04-22 PROCEDURE — 80048 BASIC METABOLIC PNL TOTAL CA: CPT | Performed by: HOSPITALIST

## 2024-04-22 PROCEDURE — 95700 EEG CONT REC W/VID EEG TECH: CPT

## 2024-04-22 RX ORDER — DEXAMETHASONE 2 MG/1
1 TABLET ORAL DAILY
Status: DISCONTINUED | OUTPATIENT
Start: 2024-04-23 | End: 2024-04-22

## 2024-04-22 RX ORDER — ACETAMINOPHEN 325 MG/1
650 TABLET ORAL EVERY 6 HOURS PRN
Status: CANCELLED | OUTPATIENT
Start: 2024-04-22

## 2024-04-22 RX ORDER — ENOXAPARIN SODIUM 100 MG/ML
40 INJECTION SUBCUTANEOUS DAILY
Status: DISCONTINUED | OUTPATIENT
Start: 2024-04-23 | End: 2024-05-01 | Stop reason: HOSPADM

## 2024-04-22 RX ORDER — ENOXAPARIN SODIUM 100 MG/ML
40 INJECTION SUBCUTANEOUS DAILY
Status: CANCELLED | OUTPATIENT
Start: 2024-04-23

## 2024-04-22 RX ORDER — LACOSAMIDE 10 MG/ML
200 INJECTION, SOLUTION INTRAVENOUS ONCE
Status: COMPLETED | OUTPATIENT
Start: 2024-04-22 | End: 2024-04-22

## 2024-04-22 RX ORDER — AMLODIPINE BESYLATE 10 MG/1
10 TABLET ORAL DAILY
Status: DISCONTINUED | OUTPATIENT
Start: 2024-04-23 | End: 2024-05-01 | Stop reason: HOSPADM

## 2024-04-22 RX ORDER — LACOSAMIDE 10 MG/ML
100 INJECTION, SOLUTION INTRAVENOUS EVERY 12 HOURS
Status: DISCONTINUED | OUTPATIENT
Start: 2024-04-23 | End: 2024-04-23

## 2024-04-22 RX ORDER — LOSARTAN POTASSIUM 50 MG/1
50 TABLET ORAL DAILY
Status: DISCONTINUED | OUTPATIENT
Start: 2024-04-23 | End: 2024-05-01 | Stop reason: HOSPADM

## 2024-04-22 RX ORDER — LOSARTAN POTASSIUM 50 MG/1
50 TABLET ORAL DAILY
Status: CANCELLED | OUTPATIENT
Start: 2024-04-23

## 2024-04-22 RX ORDER — PANTOPRAZOLE SODIUM 40 MG/1
40 TABLET, DELAYED RELEASE ORAL DAILY
Status: CANCELLED | OUTPATIENT
Start: 2024-04-23

## 2024-04-22 RX ORDER — ONDANSETRON 2 MG/ML
4 INJECTION INTRAMUSCULAR; INTRAVENOUS EVERY 6 HOURS PRN
Status: CANCELLED | OUTPATIENT
Start: 2024-04-22

## 2024-04-22 RX ORDER — PRAMIPEXOLE DIHYDROCHLORIDE 0.25 MG/1
0.25 TABLET ORAL 3 TIMES DAILY
Status: DISCONTINUED | OUTPATIENT
Start: 2024-04-22 | End: 2024-05-01 | Stop reason: HOSPADM

## 2024-04-22 RX ORDER — POLYETHYLENE GLYCOL 3350 17 G/17G
17 POWDER, FOR SOLUTION ORAL DAILY PRN
Status: DISCONTINUED | OUTPATIENT
Start: 2024-04-22 | End: 2024-04-23

## 2024-04-22 RX ORDER — LACOSAMIDE 10 MG/ML
100 INJECTION, SOLUTION INTRAVENOUS EVERY 12 HOURS
Status: DISCONTINUED | OUTPATIENT
Start: 2024-04-22 | End: 2024-04-22

## 2024-04-22 RX ORDER — AMLODIPINE BESYLATE 10 MG/1
10 TABLET ORAL DAILY
Status: CANCELLED | OUTPATIENT
Start: 2024-04-23

## 2024-04-22 RX ORDER — DEXAMETHASONE 4 MG/1
4 TABLET ORAL DAILY
Status: DISCONTINUED | OUTPATIENT
Start: 2024-04-23 | End: 2024-05-01 | Stop reason: HOSPADM

## 2024-04-22 RX ORDER — LEVETIRACETAM 500 MG/5ML
2000 INJECTION, SOLUTION, CONCENTRATE INTRAVENOUS EVERY 12 HOURS SCHEDULED
Status: CANCELLED | OUTPATIENT
Start: 2024-04-22

## 2024-04-22 RX ORDER — ATORVASTATIN CALCIUM 40 MG/1
40 TABLET, FILM COATED ORAL
Status: CANCELLED | OUTPATIENT
Start: 2024-04-22

## 2024-04-22 RX ORDER — LORAZEPAM 2 MG/ML
0.5 INJECTION INTRAMUSCULAR EVERY 6 HOURS
Status: DISCONTINUED | OUTPATIENT
Start: 2024-04-22 | End: 2024-04-24

## 2024-04-22 RX ORDER — ACETAMINOPHEN 325 MG/1
975 TABLET ORAL EVERY 8 HOURS PRN
Status: DISCONTINUED | OUTPATIENT
Start: 2024-04-22 | End: 2024-05-01 | Stop reason: HOSPADM

## 2024-04-22 RX ORDER — MAGNESIUM HYDROXIDE/ALUMINUM HYDROXICE/SIMETHICONE 120; 1200; 1200 MG/30ML; MG/30ML; MG/30ML
30 SUSPENSION ORAL EVERY 6 HOURS PRN
Status: DISCONTINUED | OUTPATIENT
Start: 2024-04-22 | End: 2024-05-01 | Stop reason: HOSPADM

## 2024-04-22 RX ORDER — LACOSAMIDE 10 MG/ML
100 INJECTION, SOLUTION INTRAVENOUS EVERY 12 HOURS
Status: CANCELLED | OUTPATIENT
Start: 2024-04-22

## 2024-04-22 RX ORDER — LACOSAMIDE 10 MG/ML
100 INJECTION, SOLUTION INTRAVENOUS EVERY 12 HOURS
Status: DISCONTINUED | OUTPATIENT
Start: 2024-04-22 | End: 2024-04-22 | Stop reason: HOSPADM

## 2024-04-22 RX ORDER — ONDANSETRON 2 MG/ML
4 INJECTION INTRAMUSCULAR; INTRAVENOUS EVERY 6 HOURS PRN
Status: DISCONTINUED | OUTPATIENT
Start: 2024-04-22 | End: 2024-04-24

## 2024-04-22 RX ORDER — DEXAMETHASONE 0.5 MG/1
1 TABLET ORAL DAILY
Status: CANCELLED | OUTPATIENT
Start: 2024-04-23

## 2024-04-22 RX ORDER — ATORVASTATIN CALCIUM 40 MG/1
40 TABLET, FILM COATED ORAL
Status: DISCONTINUED | OUTPATIENT
Start: 2024-04-22 | End: 2024-05-01 | Stop reason: HOSPADM

## 2024-04-22 RX ORDER — LEVETIRACETAM 500 MG/5ML
2000 INJECTION, SOLUTION, CONCENTRATE INTRAVENOUS EVERY 12 HOURS SCHEDULED
Status: DISCONTINUED | OUTPATIENT
Start: 2024-04-22 | End: 2024-04-24

## 2024-04-22 RX ORDER — LORAZEPAM 2 MG/ML
0.5 INJECTION INTRAMUSCULAR EVERY 6 HOURS PRN
Status: DISCONTINUED | OUTPATIENT
Start: 2024-04-22 | End: 2024-04-22

## 2024-04-22 RX ORDER — PANTOPRAZOLE SODIUM 40 MG/1
40 TABLET, DELAYED RELEASE ORAL
Status: DISCONTINUED | OUTPATIENT
Start: 2024-04-23 | End: 2024-05-01 | Stop reason: HOSPADM

## 2024-04-22 RX ORDER — PRAMIPEXOLE DIHYDROCHLORIDE 0.12 MG/1
0.25 TABLET ORAL 3 TIMES DAILY
Status: CANCELLED | OUTPATIENT
Start: 2024-04-22

## 2024-04-22 RX ADMIN — LOSARTAN POTASSIUM 50 MG: 50 TABLET, FILM COATED ORAL at 08:56

## 2024-04-22 RX ADMIN — ACETAMINOPHEN 975 MG: 325 TABLET, FILM COATED ORAL at 22:02

## 2024-04-22 RX ADMIN — AMLODIPINE BESYLATE 10 MG: 10 TABLET ORAL at 08:56

## 2024-04-22 RX ADMIN — PRAMIPEXOLE DIHYDROCHLORIDE 0.25 MG: 0.12 TABLET ORAL at 08:56

## 2024-04-22 RX ADMIN — PRAMIPEXOLE DIHYDROCHLORIDE 0.25 MG: 0.12 TABLET ORAL at 15:39

## 2024-04-22 RX ADMIN — LORAZEPAM 1 MG: 2 INJECTION INTRAMUSCULAR; INTRAVENOUS at 06:01

## 2024-04-22 RX ADMIN — LORAZEPAM 0.5 MG: 2 INJECTION INTRAMUSCULAR; INTRAVENOUS at 22:39

## 2024-04-22 RX ADMIN — LEVETIRACETAM 2000 MG: 100 INJECTION, SOLUTION INTRAVENOUS at 20:57

## 2024-04-22 RX ADMIN — LACOSAMIDE 200 MG: 10 INJECTION INTRAVENOUS at 12:58

## 2024-04-22 RX ADMIN — ATORVASTATIN CALCIUM 40 MG: 40 TABLET, FILM COATED ORAL at 17:02

## 2024-04-22 RX ADMIN — ENOXAPARIN SODIUM 40 MG: 40 INJECTION SUBCUTANEOUS at 08:56

## 2024-04-22 RX ADMIN — PANTOPRAZOLE SODIUM 40 MG: 40 TABLET, DELAYED RELEASE ORAL at 08:56

## 2024-04-22 RX ADMIN — LACOSAMIDE 100 MG: 10 INJECTION INTRAVENOUS at 12:57

## 2024-04-22 RX ADMIN — ACETAMINOPHEN 650 MG: 325 TABLET ORAL at 15:42

## 2024-04-22 RX ADMIN — DEXAMETHASONE 1 MG: 0.5 TABLET ORAL at 08:56

## 2024-04-22 RX ADMIN — PRAMIPEXOLE DIHYDROCHLORIDE 0.25 MG: 0.25 TABLET ORAL at 20:57

## 2024-04-22 RX ADMIN — LEVETIRACETAM 2000 MG: 100 INJECTION, SOLUTION INTRAVENOUS at 09:07

## 2024-04-22 NOTE — DISCHARGE SUMMARY
[facility]  Discharge- Paras Pitts 1955, 68 y.o. male MRN: [id number]  Unit/Bed#: -01 Encounter: [id number]  Primary Care Provider: Maikel Brower DO   Date and time admitted to hospital: 4/20/2024  1:25 PM    * Tremors of nervous system  Assessment & Plan  Onset of symptoms in the afternoon on 04/20 w/ full body tremors; continued LLE tremors & LUE weakness; cannot bear weight on LLE   Etiology unclear   Differential includes partial seizure disorder vs. benign essential tremors vs. 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 Residual/recurrent disease is felt to be less likely. Stable surrounding hyperintense   T2/FLAIR signal likely related to a combination of posttreatment changes and resolving vasogenic edema. There is no new suspicious intra-axial lesion.  Mild to moderate chronic microangiopathic ischemic changes  Case reviewed with Dr. Stark   Continue   Keppra 2000 g every 12 hours  Dexamethasone 1 mg daily  Discontinue scheduled Ativan  Load w/ Vimpat 200 mg x 1; continue Vimpat 100 mg every 12 hours  PT/OT consulted   Transfer to [facility] for video EEG monitoring to guide AED escalation   Consult radiation oncology per neurology's recommendation    Carcinoma of right lung (HCC)  Assessment & Plan  Patient is s/p robotic converted to right thoracotomy and right upper lobectomy on 9/1/2023 for poorly differentiated adenocarcinoma per patient's records   s/p bronchoscopy and EBUS biopsy of 3 lymph nodes at [facility] on 4/16/2024  Continue methadone; patient was on taper prior to arrival  Patient will continue to follow with hematology/oncology in outpatient setting    Metastatic adenocarcinoma (HCC)  Assessment & Plan  S/p robotic converted to  right thoracotomy and right upper lobectomy on 09/01/23  With metastatic disease to the brain  Continue dexamethasone  Received chemotherapy/immunotherapy and surgical resection of brain lesion followed by SRS  PET scan 3/25/2024: FDG-avid mediastinal lymphadenopathy as seen on recent CT compatible with metastasis.  4/16/24 had bronchoscopy and EBUS biopsy of 3 lymph nodes at [facility]  MRI brain as above   Will continue to follow with hematology/oncology in outpatient setting    Generalized weakness  Assessment & Plan  PT/OT consulted  Multifactorial-secondary to his advancing age, cancer, and other medical comorbid condition    History of CVA (cerebrovascular accident)  Assessment & Plan  Patient has a history of a CVA with long-term sequelae of left-sided weakness  Continue Lipitor     Hypercholesterolemia  Assessment & Plan  Continue prehospital statin    Essential hypertension  Assessment & Plan  BP stable (-150)   Continue amlodipine and losartan    Overweight (BMI 25.0-29.9)  Assessment & Plan  BMI 28.8  Counseled on diet, exercise, lifestyle changes      Medical Problems       Resolved Problems  Date Reviewed: 4/21/2024   None       Discharging Physician / Practitioner: Tanya Maradiaga PA-C  PCP: Maikel Brower DO  Admission Date:   Admission Orders (From admission, onward)       Ordered        04/21/24 0921  INPATIENT ADMISSION  Once            04/20/24 1556  Place in Observation  Once                          Discharge Date: 04/22/24    Disposition:    Acute Care Hospital Transfer to [facility]    Reason for Admission: Tremors    Discharge Diagnoses:   Please see assessment and plan section above for further details regarding discharge diagnoses.     Consultations During Hospital Stay:  Neurology  Case management  PT/OT  Radiation oncology consult placed    Procedures Performed:   None    Significant Findings / Test Results:   Please see A/P section above    Incidental Findings:   None    Test Results  Pending at Discharge (will require follow up):   None     Outpatient Tests Requested:  Unclear; pending transfer to [facility]    Complications:  None    Hospital Course:      Paras Pitts is a 68 y.o. male patient who originally presented to the hospital on 4/20/2024 due to tremors.  Patient does chronic left-sided deficits following a previous stroke.  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  Previously, patient was able to drive his car and walk.  Patient can no longer bear weight on left lower extremity.  Patient has persistent left upper extremity weakness and tremors in the left lower extremity.  Etiology of tremors may be secondary to partial seizure disorder versus radiation necrosis.  Case reviewed with Dr. Stark on 04/22 who recommends transfer to [place] for video EEG monitoring to guide AED escalation.  Neurology also recommended a radiation oncology consultation for further management.  Patient is on high-dose Keppra, dexamethasone, and Vimpat.  Vimpat was added on 04/22.  Scheduled Ativan ATC was discontinued on 04/22.    Condition at Discharge: guarded    Discharge Day Visit / Exam:   * Please refer to separate progress note for these details *    Discussion with Family: Wife updated at bedside     Medication Adjustments and Discharge Medications:  Discharge Medication List: See after visit summary for reconciled discharge medications.   Medication Dosing Tapers - Please refer to Discharge Medication List for details on any medication dosing tapers (if applicable to patient).   Summary of Medication Adjustments made as a result of this hospitalization: Please refer to A/P above   Medications being temporarily held (include recommended restart time): None    Wound Care Recommendations:  When applicable, please see wound care section of After Visit Summary.    Instructions for any Catheters / Lines Present at Discharge (including removal  date, if applicable): N/A    Diet Recommendations at Discharge:  Diet -        Diet Orders   (From admission, onward)                 Start     Ordered    04/20/24 1649  Diet Cardiovascular; Cardiac  Diet effective now        References:    Adult Nutrition Support Algorithm    RD Therapeutic Diet Order Protocol   Question Answer Comment   Diet Type Cardiovascular    Cardiac Cardiac    RD to adjust diet per protocol? Yes        04/20/24 1649                    Mobility at time of Discharge:   Basic Mobility Inpatient Raw Score: 14  JH-HLM Goal: 4: Move to chair/commode  JH-HLM Achieved: 4: Move to chair/commode  HLM Goal NOT achieved. Continue to encourage mobility in post discharge setting.    Goals of Care Discussions:  Code Status at Discharge: Level 1 - Full Code    Discharge instructions/Information to patient and family:   See after visit summary section titled Discharge Instructions for information provided to patient and family.      Planned Readmission: Transfer to [facility]      Discharge Statement:  I spent 35 minutes discharging the patient. This time was spent on the day of discharge. I had direct contact with the patient on the day of discharge. Greater than 50% of the total time was spent examining patient, answering all patient questions, arranging and discussing plan of care with patient as well as directly providing post-discharge instructions.  Additional time then spent on discharge activities.    **Please Note: This note may have been constructed using a voice recognition system.**

## 2024-04-22 NOTE — CASE MANAGEMENT
Case Management Assessment & Discharge Planning Note    Patient name Paras Pitts  Location /-01 MRN [id number]  : 1955 Date 2024       Current Admission Date: 2024  Current Admission Diagnosis:Tremors of nervous system   Patient Active Problem List    Diagnosis Date Noted    Generalized weakness 2024    Tremors of nervous system 2024    History of CVA (cerebrovascular accident) 2023    Chemotherapy-induced neutropenia (HCC) 2023    Encounter for central line care 2023    Carcinoma of right lung (HCC) 2023    Lung nodule 2023    Metastatic adenocarcinoma (HCC)     Hypercholesterolemia 2021    Essential hypertension 2020    Annual physical exam 2020    Negative depression screening 2020    Overweight (BMI 25.0-29.9) 2020      LOS (days): 1  Geometric Mean LOS (GMLOS) (days):   Days to GMLOS:     OBJECTIVE:    Risk of Unplanned Readmission Score: 14.74         Current admission status: Inpatient       Preferred Pharmacy:   [facility] - [facility] [id number] [address]  Phone: [phone] Fax: [phone]    Primary Care Provider: Maikel Brower DO    Primary Insurance: BLUE CROSS  Secondary Insurance: MEDICARE    ASSESSMENT:  Active Health Care Proxies       ABILIO CHAN [facility] Care Representative - Spouse   Primary Phone: [phone] (Mobile)                 Advance Directives  Does patient have a Health Care POA?: No  Was patient offered paperwork?: Yes (paperwork provided)  Does patient currently have a Health Care decision maker?: Yes, please see Health Care Proxy section  Does patient have Advance Directives?: No  Was patient offered paperwork?: Yes (paperwork provided)  Primary Contact: Jaclyn Estrada-    Readmission Root Cause  30 Day Readmission: No    Patient Information  Admitted from:: Home  Mental Status: Alert  During  Assessment patient was accompanied by: Spouse  Assessment information provided by:: Patient  Primary Caregiver: Self  Support Systems: Spouse/significant other  County of Residence: [place]  What city do you live in?: [place]  Home entry access options. Select all that apply.: Stairs  Number of steps to enter home.: 4  Do the steps have railings?: Yes  Type of Current Residence: 3 story home  Upon entering residence, is there a bedroom on the main floor (no further steps)?: No  A bedroom is located on the following floor levels of residence (select all that apply):: 2nd Floor  Upon entering residence, is there a bathroom on the main floor (no further steps)?: Yes  Living Arrangements: Lives w/ Spouse/significant other    Activities of Daily Living Prior to Admission  Functional Status: Independent  Completes ADLs independently?: Yes  Ambulates independently?: Yes  Does patient use assisted devices?: Yes  Assisted Devices (DME) used: Straight Cane  Does patient currently own DME?: Yes  What DME does the patient currently own?: Straight Cane  Does patient have a history of Outpatient Therapy (PT/OT)?: Yes  Does the patient have a history of Short-Term Rehab?: No  Does patient have a history of HHC?: No  Does patient currently have HHC?: No    Patient Information Continued  Income Source: Pension/halfway  Does patient have prescription coverage?: Yes  Does patient receive dialysis treatments?: No  Does patient have a history of substance abuse?: No  Does patient have a history of Mental Health Diagnosis?: No    PHQ 2/9 Screening   Reviewed PHQ 2/9 Depression Screening Score?: No    Means of Transportation  Means of Transport to Appts:: Family transport      Social Determinants of Health (SDOH)      Flowsheet Row Most Recent Value   Housing Stability    In the last 12 months, was there a time when you were not able to pay the mortgage or rent on time? N   In the last 12 months, how many places have you lived? 1    In the last 12 months, was there a time when you did not have a steady place to sleep or slept in a shelter (including now)? N   Transportation Needs    In the past 12 months, has lack of transportation kept you from medical appointments or from getting medications? no   In the past 12 months, has lack of transportation kept you from meetings, work, or from getting things needed for daily living? No   Food Insecurity    Within the past 12 months, you worried that your food would run out before you got the money to buy more. Never true   Within the past 12 months, the food you bought just didn't last and you didn't have money to get more. Never true   Utilities    In the past 12 months has the electric, gas, oil, or water company threatened to shut off services in your home? No            DISCHARGE DETAILS:    Discharge planning discussed with:: Pt and spouse.  Pt being transferred to [facility] for continuos EEG    Contacts  Patient Contacts: Tiara Kelly  Relationship to Patient:: Other (Comment) (SO)  Contact Method: In Person  Reason/Outcome: Emergency Contact    Requested Home Health Care         Is the patient interested in HHC at discharge?: No    DME Referral Provided  Referral made for DME?: No  As per wife the pt was supposed to have mapping done for radiation tomorrow.  Chemotherapy has not been set up at this point.  Pt is being transferred to [facility], for EEG monitoring.  Sent to  support for auth for BLS transport.

## 2024-04-22 NOTE — ASSESSMENT & PLAN NOTE
Patient has known history of hypertension, no issues with blood pressure prior to transfer.  Home regimen: Amlodipine, losartan    Assessment: Hypertension, well-controlled    Plan:  - Continue amlodipine and losartan

## 2024-04-22 NOTE — TELEMEDICINE
e-Consult ([facility])   Paras Pitts 68 y.o. male MRN: [id number]  Unit/Bed#: -01 Encounter: [id number]      Reason for Consult / Principal Problem: Brain metastasis/Seizure  Inpatient consult to Radiation Oncology  Consult performed by: James Contreras MD  Consult ordered by: Tanya Maradiaga PA-C        04/22/24      ASSESSMENT:  Mr. Paras Pitts is a 68 year old man with Stage JAY ([id number]) lung adenocarcinoma s/p resection of a right frontal metastasis. On 4/28/23 he completed a course of postoperative SRT to a dose of 3000cGy in 5 fractions. He was last seen in clinic on 7/5/23. He thereafter underwent neoadjuvant chemoimmunotherapy (carbo/taxol/nivo from 5/18/23-7/20/23) followed by right thoracotomy with RUL lobectomy and extensive mediastinal LN dissection. He has since developed mediastinal recurrence and is planned for chemoRT.     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 This has proven recalcitrant to steroid taper. His case was reviewed at neuro-oncology tumor board (4/3/24) with consensus recommendation for Avastin for treatment of his radionecrosis. He was scheduled to begin this with Dr. Langford concurrent with chemotherapy and RT to this mediastinal disease. He has since been admitted with a seizure secondary to further edema. E-consult is being performed to offer recommendations on the management of radionecrosis.     At present he has already suffered the effect of prolonged high dose dexamethasone. While a short course of dexamethasone could be offered to help bridge his acute circumstance, he will need Avastin as his next line in therapy. Should this fail he could require neurosurgical intervention.     RECOMMENDATIONS:  - Increase dexamethasone to 4mg BID  - Cont AED per neurology; pending transfer to [facility] to guide anti-epileptic drug dosing  - Will need to start Avastin as an outpatient as soon as possible, dexamethasone can be tapered  following Avastin start  - Will need chemoRT as soon as possible for recurrent lung cancer. If patient not likely to be discharged in the near future recommend transfer to [facility] (following stabilization) for inpatient CT simulation and planning.     Discussed these recommendations with the patient's wife (Bossman, wife) and Dr. Stark (by telephone).     31 + minutes, >50% of the total time devoted to medical consultative verbal/EMR discussion between providers. Written report will be generated in the EMR.    James Contreras MD

## 2024-04-22 NOTE — ASSESSMENT & PLAN NOTE
Onset of symptoms in the afternoon on 04/20 w/ full body tremors; continued LLE tremors & LUE weakness; cannot bear weight on LLE   Etiology unclear   Differential includes partial seizure disorder vs. benign essential tremors vs. 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 Residual/recurrent disease is felt to be less likely. Stable surrounding hyperintense   T2/FLAIR signal likely related to a combination of posttreatment changes and resolving vasogenic edema. There is no new suspicious intra-axial lesion.  Mild to moderate chronic microangiopathic ischemic changes  Case reviewed with Dr. Stark   Continue   Keppra 2000 g every 12 hours  Dexamethasone 1 mg daily  Discontinue scheduled Ativan  Load w/ Vimpat 200 mg x 1; continue Vimpat 100 mg every 12 hours  PT/OT consulted   Transfer to [facility] for video EEG monitoring to guide AED escalation   Consult radiation oncology per neurology's recommendation

## 2024-04-22 NOTE — ASSESSMENT & PLAN NOTE
History of metastatic lung adenocarcinoma w/ mets to brain. Most recent staging: Stage JAY (cT3, cN1, cM1b)  He has undergone multiple rounds of chemotherapy and radiation and has undergone surgical resection for the metastatic brain lesion.  A PET scan on 3/25/2024 showed uptake in mediastinal lymph nodes compatible with metastasis.  Repeat MRI brain during current hospitalization showed stable postsurgical changes and lack of new lesions.  He has been taking Decadron at home for vasogenic edema of the brain.    Patient now presenting with new seizure activity.  He has been evaluated at the [facility] by our neurology colleagues.  He has been started on antiepileptic drugs but has continued to have breakthrough seizures.  He is being transferred to [facility] for video EEG monitoring and additional AED titration.    Assessment: Metastatic adenocarcinoma of the lung with metastasis to the brain s/p surgical resection and SRS on daily Decadron    Plan:  - Continue Decadron at 1 mg/day  - Neurology will weigh in on oncology consultation while hospitalized. On initial evaluation no immediate need for oncology recommendations

## 2024-04-22 NOTE — NURSING NOTE
Report called and spoke to Anushka on EMU unit.  Pt left in stable condition.  Wife took all belongings.  Left in  stable condition via [facility].

## 2024-04-22 NOTE — QUICK NOTE
Reviewed with on-call neurologist.  Patient would benefit from transfer to [place] to obtain EEG.  Ativan discontinued; Vimpat bolus ordered; Vimpat scheduled dosing ordered.

## 2024-04-22 NOTE — PHYSICAL THERAPY NOTE
PHYSICAL THERAPY EVALUATION  NAME:  Paras Pitts  DATE: 04/22/24    AGE:   68 y.o.  Mrn:   [id number]  ADMIT DX:  Partial seizures (HCC) [R56.9]  Weakness [R53.1]  Metastatic lung cancer (metastasis from lung to other site) (HCC) [C34.90]  Problem List:   Patient Active Problem List   Diagnosis    Negative depression screening    Overweight (BMI 25.0-29.9)    Essential hypertension    Annual physical exam    Hypercholesterolemia    Lung nodule    Metastatic adenocarcinoma (HCC)    Carcinoma of right lung (HCC)    Encounter for central line care    Chemotherapy-induced neutropenia (HCC)    History of CVA (cerebrovascular accident)    Generalized weakness    Tremors of nervous system       Past Medical History  Past Medical History:   Diagnosis Date    Brain compression (HCC) 03/20/2023    Brain mass 03/20/2023    Cancer (HCC) 2001    Receint lung and brain lesions and prostate cancer in 2001    Cerebral edema (HCC) 03/20/2023    CVA (cerebral vascular accident) (HCC) 08/12/2021    Hypertension     Prostate cancer (HCC) 2001    Rectal bleeding     Stroke (HCC)     2011         Past Surgical History  Past Surgical History:   Procedure Laterality Date    COLONOSCOPY      CRANIOTOMY Right 3/23/2023    Procedure: Right frontal CRANIOTOMY IMAGE-GUIDED FOR TUMOR;  Surgeon: Clark Carrillo MD;  [facility];  Service: Neurosurgery    ENDOBRONCHIAL ULTRASOUND (EBUS) N/A 4/16/2024    Procedure: ENDOBRONCHIAL ULTRASOUND (EBUS);  Surgeon: Kalia Sparrow MD;  [facility];  Service: Thoracic    IR PORT PLACEMENT  4/27/2023    [place] [facility] INCL FLUOR GDNCE DX W/CELL WASHG SPX N/A 9/1/2023    Procedure: BRONCHOSCOPY FLEXIBLE;  Surgeon: Kalia Sparrow MD;  [facility];  Service: Thoracic    [place] BRNCHSC INCL FLUOR GDNCE DX W/CELL WASHG SPX N/A 4/16/2024    Procedure: BRONCHOSCOPY FLEXIBLE;  Surgeon: Kalia Sparrow MD;  [facility];  Service: Thoracic    [place] CYSTOURETHROSCOPY N/A 3/23/2023     Procedure: EUA, DEL CASTILLO INSERTION;  Surgeon: Ajay Piedra MD;  [facility];  Service: Urology    [place] THORACOSCOPY W/LOBECTOMY SINGLE LOBE Right 9/1/2023    Procedure: robotic assisted converted to open right upper lobectomy, lymph node dissection;  Surgeon: Kalia Sparrow MD;  [facility];  Service: Thoracic    PROSTATECTOMY  2001    THORACOTOMY Right 9/1/2023    Procedure: right thoracotomy with Cryo-Ablation;  Surgeon: Kalia Sparrow MD;  [facility];  Service: Thoracic    TONSILLECTOMY         Length Of Stay: 1  Performed at least 2 patient identifiers during session: Name and ID bracelet       04/22/24 0825   PT Last Visit   PT Visit Date 04/22/24   Note Type   Note type Evaluation   Pain Assessment   Pain Assessment Tool 0-10   Pain Score No Pain   Restrictions/Precautions   Weight Bearing Precautions Per Order No   Braces or Orthoses   (none reported)   Other Precautions Chair Alarm;Bed Alarm;Seizure;Fall Risk;Pain   Home Living   Type of Home House   Home Layout Stairs to enter with rails;Bed/bath upstairs;Two level  (4 ZACH w/ HR; FOS to 2nd floor with HR)   Bathroom Shower/Tub Tub/shower unit   Bathroom Toilet Standard   Bathroom Equipment Grab bars in shower   Home Equipment Cane  (SPC used occ at baseline)   Prior Function   Level of [place] Independent with functional mobility;Independent with ADLs;Independent with IADLS   Lives With Spouse   Receives Help From Family   IADLs Independent with driving;Independent with meal prep;Independent with medication management   Falls in the last 6 months 1 to 4  (x1 fall)   Vocational Full time employment   General   Family/Caregiver Present Yes   Cognition   Overall Cognitive Status WFL   Arousal/Participation Alert   Attention Within functional limits   Orientation Level Oriented X4   Memory Decreased recall of precautions   Following Commands Follows one step commands without difficulty   RUE Assessment   RUE Assessment WFL    RUE Strength   RUE Overall Strength   (4-/5)   LUE Assessment   LUE Assessment WFL  (with increased time and effort for full AROM)   LUE Strength   LUE Overall Strength   (3/5  strength)   RLE Assessment   RLE Assessment WFL   LLE Assessment   LLE Assessment X   Strength LLE   LLE Overall Strength 3-/5   Vision-Basic Assessment   Current Vision Wears glasses all the time   Coordination   Movements are Fluid and Coordinated   (LLE tremors noted)   Light Touch   LLE Light Touch Impaired   Bed Mobility   Supine to Sit 3  Moderate assistance   Additional items Assist x 1;HOB elevated;Bedrails;Increased time required;Verbal cues;LE management   Additional Comments pt reproted dizziness with transitional movement; BP seated 150/105   Transfers   Sit to Stand 3  Moderate assistance   Additional items Assist x 2;Bedrails;Increased time required;Verbal cues   Stand to Sit 3  Moderate assistance   Additional items Assist x 2;Increased time required;Verbal cues   Additional Comments L LE buckeling noted with stance x 2; Patience Steady used to safely transfer into the recliner   Balance   Static Sitting Fair   Dynamic Sitting Fair -   Static Standing Poor +   Dynamic Standing Poor   Endurance Deficit   Endurance Deficit Yes   Endurance Deficit Description pt reported fatigue with activity   Activity Tolerance   Activity Tolerance Patient limited by fatigue;Treatment limited secondary to medical complications (Comment)  (tremors/weakness on L side)   Nurse Made Aware RN made aware of session outcomes   Assessment   Prognosis Fair   Assessment Pt is 68 y.o. male seen for PT evaluation s/p admit to [facility] on 4/20/2024 w/ Tremors of nervous system. PT consulted to assess pt's functional mobility and d/c needs. Order placed for PT eval and tx, w/ up and OOB as tolerated order. Pt agreeable to PT  session upon arrival, pt found supine in bed.  PTA, pt was independent w/ all functional mobility w/ SPC, has 4 ZACH, and  lives w/ wife in 2 level home .  Pt to benefit from continued PT tx to address deficits and maximize level of functional independent mobility and consistency. Upon conclusion pt  seated in recliner. Complexity: Comorbidities affecting pt's physical performance at time of assessment include: obesity, CVA, and lung CA . Personal factors affecting pt at time of IE include: limited mobility, lives in multistory home, steps to enter home, and inability to ambulate household distances. Please find objective findings from PT assessment regarding body systems outlined above with impairments and limitations including weakness, impaired balance, decreased endurance, impaired coordination, gait deviations, pain, decreased activity tolerance, decreased functional mobility tolerance, decreased safety awareness, and fall risk.  Pt's clinical presentation is currently unstable/unpredictable seen in pt's presentation of abnormal potassium levels and abnormal blood sugar levels. The patient's AM-PAC Basic Mobility Inpatient Short Form Raw Score is 9.  Based on patient presentations and impairments, pt would most appropriately benefit from Level 1 resource intensity upon discharge. Please also refer to the recommendation of the Physical Therapist for safe discharge planning. Pt seen as a co-eval with OT due to the patient's co-morbidities, clinically unstable presentation, and present impairments which are a regression from the patient's baseline.   Barriers to Discharge Inaccessible home environment   Goals   Patient Goals to get OOB to the recliner   LTG Expiration Date 05/02/24   Long Term Goal #1 Pt will: Perform bed mobility tasks to modified I to improve ease of bed mobility. Perform transfers to modified I to improve ease of transfers. Perform ambulation with MI and LRAD for 250 feet to increase Indep in home environment. Increase dynamic standing balance to F+ to decrease fall risk. Increase OOB activity tolerance to 10  minutes without s/s of exertion to decrease fall risk. Navigate up and down 4 steps with MI so patient can enter and exit home.   Plan   Treatment/Interventions Functional transfer training;Therapeutic exercise;Endurance training;Gait training;Bed mobility;Equipment eval/education;Elevations   PT Frequency 3-5x/wk   Discharge Recommendation   Rehab Resource Intensity Level, PT I (Maximum Resource Intensity)   Equipment Recommended   (TBD)   AM-PAC Basic Mobility Inpatient   Turning in Flat Bed Without Bedrails 3   Lying on Back to Sitting on Edge of Flat Bed Without Bedrails 2   Moving Bed to Chair 1   Standing Up From Chair Using Arms 1   Walk in Room 1   Climb 3-5 Stairs With Railing 1   Basic Mobility Inpatient Raw Score 9   Turning Head Towards Sound 4   Follow Simple Instructions 4   Low Function Basic Mobility Raw Score  17   Low Function Basic Mobility Standardized Score  27.46   [facility] Level Of Mobility   -[facility] Goal 3: Sit at edge of bed       Time In: 0758  Time Out: 0825  Total Evaluation Minutes: 27    Merced Rodriguez, PT

## 2024-04-22 NOTE — ASSESSMENT & PLAN NOTE
Onset of symptoms in the afternoon on 04/20 w/ full body tremors; continued LLE tremors & LUE weakness; cannot bear weight on LLE   Etiology unclear   Differential includes partial seizure disorder vs. benign essential tremors vs. 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 Residual/recurrent disease is felt to be less likely. Stable surrounding hyperintense   T2/FLAIR signal likely related to a combination of posttreatment changes and resolving vasogenic edema. There is no new suspicious intra-axial lesion.  Mild to moderate chronic microangiopathic ischemic changes  Case reviewed with Dr. Stark   Continue   Keppra 2000 g every 12 hours  Dexamethasone 1 mg daily  Discontinue scheduled Ativan  Load w/ Vimpat 200 mg x 1; continue Vimpat 100 mg every 12 hours  PT/OT consulted   Transfer to [facility] for video EEG monitoring to guide AED escalation   Consult radiation oncology per neurology's recommendation

## 2024-04-22 NOTE — PLAN OF CARE
Problem: SAFETY ADULT  Goal: Patient will remain free of falls  Description: INTERVENTIONS:  - Educate patient/family on patient safety including physical limitations  - Instruct patient to call for assistance with activity   - Consult OT/PT to assist with strengthening/mobility   - Keep Call bell within reach  - Keep bed low and locked with side rails adjusted as appropriate  - Keep care items and personal belongings within reach  - Initiate and maintain comfort rounds  - Make Fall Risk Sign visible to staff  - Offer Toileting every 2 Hours, in advance of need  - Initiate/Maintain bed alarm  - Obtain necessary fall risk management equipment: non skid socks  - Apply yellow socks and bracelet for high fall risk patients  - Consider moving patient to room near nurses station  Outcome: Progressing

## 2024-04-22 NOTE — OCCUPATIONAL THERAPY NOTE
Occupational Therapy Evaluation     Patient Name: Paras Pitts  Today's Date: 4/22/2024  Problem List  Principal Problem:    Tremors of nervous system  Active Problems:    Overweight (BMI 25.0-29.9)    Essential hypertension    Hypercholesterolemia    Metastatic adenocarcinoma (HCC)    Carcinoma of right lung (HCC)    History of CVA (cerebrovascular accident)    Generalized weakness    Past Medical History  Past Medical History:   Diagnosis Date    Brain compression (HCC) 03/20/2023    Brain mass 03/20/2023    Cancer (HCC) 2001    Receint lung and brain lesions and prostate cancer in 2001    Cerebral edema (HCC) 03/20/2023    CVA (cerebral vascular accident) (HCC) 08/12/2021    Hypertension     Prostate cancer (HCC) 2001    Rectal bleeding     Stroke (HCC)     2011       Past Surgical History  Past Surgical History:   Procedure Laterality Date    COLONOSCOPY      CRANIOTOMY Right 3/23/2023    Procedure: Right frontal CRANIOTOMY IMAGE-GUIDED FOR TUMOR;  Surgeon: Clark Carrillo MD;  [facility];  Service: Neurosurgery    ENDOBRONCHIAL ULTRASOUND (EBUS) N/A 4/16/2024    Procedure: ENDOBRONCHIAL ULTRASOUND (EBUS);  Surgeon: Kalia Sparrow MD;  [facility];  Service: Thoracic    IR PORT PLACEMENT  4/27/2023    [place] BRNCHSC INCL FLUOR GDNCE DX W/CELL WASHG SPX N/A 9/1/2023    Procedure: BRONCHOSCOPY FLEXIBLE;  Surgeon: Kalia Sparrow MD;  [facility];  Service: Thoracic    [place] BRNCHSC INCL FLUOR GDNCE DX W/CELL WASHG SPX N/A 4/16/2024    Procedure: BRONCHOSCOPY FLEXIBLE;  Surgeon: Kalia Sparrow MD;  [facility];  Service: Thoracic    [place] CYSTOURETHROSCOPY N/A 3/23/2023    Procedure: EUA, DEL CASTILLO INSERTION;  Surgeon: Ajay Piedra MD;  [facility];  Service: Urology    [place] THORACOSCOPY W/LOBECTOMY SINGLE LOBE Right 9/1/2023    Procedure: robotic assisted converted to open right upper lobectomy, lymph node dissection;  Surgeon: Kalia Sparrow MD;  [facility];  " Service: Thoracic    PROSTATECTOMY  2001    THORACOTOMY Right 9/1/2023    Procedure: right thoracotomy with Cryo-Ablation;  Surgeon: Kalia Sparrow MD;  [facility];  Service: Thoracic    TONSILLECTOMY          04/22/24 0801   OT Last Visit   OT Visit Date 04/22/24   Note Type   Note type Evaluation   Additional Comments Pt seen as a co-eval with PT due to the patient's co-morbidities, clinically unstable presentation, and present impairments which are a regression from the patient's baseline.   Pain Assessment   Pain Assessment Tool 0-10   Pain Score No Pain   Restrictions/Precautions   Weight Bearing Precautions Per Order No   Braces or Orthoses   (none reported)   Other Precautions Chair Alarm;Bed Alarm;Seizure;Fall Risk;Pain   Home Living   Type of Home House   Home Layout Stairs to enter with rails;Bed/bath upstairs;Two level  (4 ZACH w/ HR; FOS to 2nd floor with HR)   Bathroom Shower/Tub Tub/shower unit   Bathroom Toilet Standard   Bathroom Equipment Grab bars in shower   Bathroom Accessibility Accessible   Home Equipment Cane  (SPC used at baseline, \"occasionally\")   Prior Function   Level of [place] Independent with functional mobility;Independent with ADLs;Independent with IADLS   Lives With Spouse   Receives Help From Family   IADLs Independent with driving;Independent with meal prep;Independent with medication management   Falls in the last 6 months 1 to 4  (x1 fall)   Vocational Full time employment   Subjective   Subjective \"I was feeling pretty good up until a few days ago, I was going to ride my bike\"   ADL   Where Assessed Edge of bed   UB Bathing Assistance 4  Minimal Assistance   LB Bathing Assistance 3  Moderate Assistance   UB Dressing Assistance 4  Minimal Assistance   LB Dressing Assistance 2  Maximal Assistance   LB Dressing Deficit   (d/t safety and balance concerns)   Bed Mobility   Supine to Sit 3  Moderate assistance   Additional items Assist x 1;HOB " elevated;Bedrails;Increased time required;Verbal cues;LE management   Sit to Supine   (DNT; pt seated in recliner upon conclusion of session)   Additional Comments VC for bedrail utilization and proper body mechanics to achieve seated EOB positioning. Pt required min A x1 for postural control and support while seated EOB d/t L side weakness/tremors   Transfers   Sit to Stand 3  Moderate assistance   Additional items Assist x 2;Bedrails;Increased time required;Verbal cues   Stand to Sit 3  Moderate assistance   Additional items Assist x 2;Increased time required;Verbal cues   Additional Comments RW used; VC for safe hand placement, proper body mechanics and RW management during STS. X2 trials of STS completed, unsafe for SPT d/t LLE significant buckeling. transfer from EOB > recliner completed w/ Velvet   Functional Mobility   Functional Mobility   (unable to assess at this time d/t LUE/LLE weakness, tremors and overall safety)   Balance   Static Sitting Fair   Dynamic Sitting Fair -   Static Standing Poor +   Dynamic Standing Poor   Ambulatory   (DNT)   Activity Tolerance   Activity Tolerance Patient limited by fatigue;Treatment limited secondary to medical complications (Comment)  (tremors/weakness on L side)   Medical Staff Made Aware Yes, CM made aware of d/c recs   Nurse Made Aware Yes, nursing staff made aware of session outcomes   RUE Assessment   RUE Assessment WFL   RUE Strength   RUE Overall Strength   (4-/5)   LUE Assessment   LUE Assessment WFL  (with increased time and effort for full AROM)   LUE Strength   LUE Overall Strength   (3/5  strength)   Hand Function   Gross Motor Coordination Impaired   Fine Motor Coordination Impaired   Sensation   Additional Comments pt reports baseline numbness in L side   Vision-Basic Assessment   Current Vision Wears glasses all the time   Psychosocial   Psychosocial (WDL) WDL   Cognition   Overall Cognitive Status WFL   Arousal/Participation  Alert;Responsive;Cooperative   Attention Within functional limits   Orientation Level Oriented X4   Memory Decreased recall of precautions   Following Commands Follows one step commands without difficulty   Comments Pt agreeable to OT evaluation, pleasant   Assessment   Limitation Decreased ADL status;Decreased UE ROM;Decreased UE strength;Decreased Safe judgement during ADL;Decreased endurance;Decreased high-level ADLs;Decreased self-care trans   Prognosis Good   Assessment Pt is a 68 y.o. male seen for OT evaluation s/p admit to [facility] on 4/20/2024 w/ Tremors of nervous system.  Comorbidities affecting pt's functional performance at time of assessment include: HTN, metastatic adenocarcinoma, carcinoma of R lung, hx of CVA, generalized weakness, chemo-induced neutropenia. Personal factors affecting pt at time of IE include:steps to enter environment, difficulty performing ADLS, difficulty performing IADLS , decreased initiation and engagement , and health management . Prior to admission, pt was I with ADLs and IADLs. Upon evaluation: the following deficits impact occupational performance: weakness, decreased strength, decreased balance, decreased tolerance, impaired GMC, impaired FMC, decreased safety awareness, and increased pain. Pt to benefit from continued skilled OT tx while in the hospital to address deficits as defined above and maximize level of functional independence w ADL's and functional mobility. Occupational Performance areas to address include: eating, grooming, bathing/shower, toilet hygiene, dressing, functional mobility, community mobility, and clothing management. From OT standpoint, recommendation at time of d/c would with max intensity OT resources.   Goals   Patient Goals to go home   Plan   Treatment Interventions ADL retraining;Functional transfer training;UE strengthening/ROM;Endurance training;Patient/family training;Compensatory technique education;Energy  conservation;Activityengagement   Goal Expiration Date 05/02/24   OT Treatment Day 0   OT Frequency 3-5x/wk   Discharge Recommendation   Rehab Resource Intensity Level, OT I (Maximum Resource Intensity)   Additional Comments  The patient's raw score on the AM-PAC Daily Activity inpatient short form is 15, standardized score is 34.69, less than 39.4. Patients at this level are likely to benefit from discharge with max intensity OT resources. Please refer to the recommendation of the Occupational Therapist for safe discharge planning.   AM-PAC Daily Activity Inpatient   Lower Body Dressing 2   Bathing 2   Toileting 2   Upper Body Dressing 3   Grooming 3   Eating 3   Daily Activity Raw Score 15   Daily Activity Standardized Score (Calc for Raw Score >=11) 34.69   AM-PAC Applied Cognition Inpatient   Following a Speech/Presentation 3   Understanding Ordinary Conversation 4   Taking Medications 4   Remembering Where Things Are Placed or Put Away 4   Remembering List of 4-5 Errands 3   Taking Care of Complicated Tasks 3   Applied Cognition Raw Score 21   Applied Cognition Standardized Score 44.3     GOALS:  Pt will achieve the following within specified time frame: LTG  Pt will achieve the following goals within 10 days    *ADL transfers with (S) for inc'd independence with ADLs/purposeful tasks    *UB ADL with (I) for inc'd independence with self cares    *LB ADL with (S) using AE prn for inc'd independence with self cares    *Toileting with (S) for clothing management and hygiene for return to PLOF with personal care    *Increase static stand balance and dyn stand balance to F+ for inc'd safety with standing purposeful tasks    *Increase stand tolerance x5 m for inc'd tolerance with standing purposeful tasks    *Bed mobility- (I) for inc'd independence to manage own comfort and initiate EOB & OOB purposeful tasks    *Participate in 10-15m UE therex to increase overall stamina/activity tolerance for purposeful  tasks      Sadaf Vincent MS, OTR/L

## 2024-04-22 NOTE — ASSESSMENT & PLAN NOTE
PT/OT consulted  Multifactorial-secondary to his advancing age, cancer, and other medical comorbid condition

## 2024-04-22 NOTE — ASSESSMENT & PLAN NOTE
Patient was diagnosed with adenocarcinoma of the right lung in 2023; stage Stage JAY (cT3, cN1, cM1b)   He is s/p robotic converted to right Thoracotomy and right upper lobectomy on 9/1/2023.  He has also undergone bronchoscopy with EBUS at [facility] on 4/16/2024.  Initial pathology showing metastatic lung cell carcinoma in the most recent biopsies.  He follows with [facility] hematology oncology for ongoing cancer management and chemotherapy.    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

## 2024-04-22 NOTE — PLAN OF CARE
Problem: PHYSICAL THERAPY ADULT  Goal: Performs mobility at highest level of function for planned discharge setting.  See evaluation for individualized goals.  Description: Treatment/Interventions: Functional transfer training, Therapeutic exercise, Endurance training, Gait training, Bed mobility, Equipment eval/education, Elevations  Equipment Recommended:  (TBD)       See flowsheet documentation for full assessment, interventions and recommendations.  Outcome: Progressing  Note: Prognosis: Fair     Assessment: Pt is 68 y.o. male seen for PT evaluation s/p admit to [facility] on 4/20/2024 w/ Tremors of nervous system. PT consulted to assess pt's functional mobility and d/c needs. Order placed for PT eval and tx, w/ up and OOB as tolerated order. Pt agreeable to PT  session upon arrival, pt found supine in bed.  PTA, pt was independent w/ all functional mobility w/ SPC, has 4 ZACH, and lives w/ wife in 2 level home .  Pt to benefit from continued PT tx to address deficits and maximize level of functional independent mobility and consistency. Upon conclusion pt  seated in recliner. Complexity: Comorbidities affecting pt's physical performance at time of assessment include: obesity, CVA, and lung CA . Personal factors affecting pt at time of IE include: limited mobility, lives in multistory home, steps to enter home, and inability to ambulate household distances. Please find objective findings from PT assessment regarding body systems outlined above with impairments and limitations including weakness, impaired balance, decreased endurance, impaired coordination, gait deviations, pain, decreased activity tolerance, decreased functional mobility tolerance, decreased safety awareness, and fall risk.  Pt's clinical presentation is currently unstable/unpredictable seen in pt's presentation of abnormal potassium levels and abnormal blood sugar levels. The patient's AM-PAC Basic Mobility Inpatient Short Form Raw  Score is 9.  Based on patient presentations and impairments, pt would most appropriately benefit from Level 1 resource intensity upon discharge. Please also refer to the recommendation of the Physical Therapist for safe discharge planning. Pt seen as a co-eval with OT due to the patient's co-morbidities, clinically unstable presentation, and present impairments which are a regression from the patient's baseline.  Barriers to Discharge: Inaccessible home environment     Rehab Resource Intensity Level, PT: I (Maximum Resource Intensity)    See flowsheet documentation for full assessment.

## 2024-04-22 NOTE — PROGRESS NOTES
[facility]  Progress Note  Name: Paras Pitts I  MRN: [id number]  Unit/Bed#: -01 I Date of Admission: 4/20/2024   Date of Service: 4/22/2024 I Hospital Day: 1    Assessment/Plan   * Tremors of nervous system  Assessment & Plan  Onset of symptoms in the afternoon on 04/20 w/ full body tremors; continued LLE tremors & LUE weakness; cannot bear weight on LLE   Etiology unclear   Differential includes partial seizure disorder vs. benign essential tremors vs. 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 Residual/recurrent disease is felt to be less likely. Stable surrounding hyperintense   T2/FLAIR signal likely related to a combination of posttreatment changes and resolving vasogenic edema. There is no new suspicious intra-axial lesion.  Mild to moderate chronic microangiopathic ischemic changes  Case reviewed with Dr. Stark   Continue   Keppra 2000 g every 12 hours  Dexamethasone 1 mg daily  Discontinue scheduled Ativan  Load w/ Vimpat 200 mg x 1; continue Vimpat 100 mg every 12 hours  PT/OT consulted   Transfer to [facility] for video EEG monitoring to guide AED escalation   Consult radiation oncology per neurology's recommendation    Carcinoma of right lung (HCC)  Assessment & Plan  Patient is s/p robotic converted to right thoracotomy and right upper lobectomy on 9/1/2023 for poorly differentiated adenocarcinoma per patient's records   s/p bronchoscopy and EBUS biopsy of 3 lymph nodes at [facility] on 4/16/2024  Continue methadone; patient was on taper prior to arrival  Patient will continue to follow with hematology/oncology in outpatient setting    Metastatic adenocarcinoma (HCC)  Assessment & Plan  S/p robotic converted to right thoracotomy and right upper lobectomy on  09/01/23  With metastatic disease to the brain  Continue dexamethasone  Received chemotherapy/immunotherapy and surgical resection of brain lesion followed by [facility]  PET scan 3/25/2024: FDG-avid mediastinal lymphadenopathy as seen on recent CT compatible with metastasis.  4/16/24 had bronchoscopy and EBUS biopsy of 3 lymph nodes at [facility]  MRI brain as above   Will continue to follow with hematology/oncology in outpatient setting    Generalized weakness  Assessment & Plan  PT/OT consulted  Multifactorial-secondary to his advancing age, cancer, and other medical comorbid condition    History of CVA (cerebrovascular accident)  Assessment & Plan  Patient has a history of a CVA with long-term sequelae of left-sided weakness  Continue Lipitor     Hypercholesterolemia  Assessment & Plan  Continue prehospital statin    Essential hypertension  Assessment & Plan  BP stable (-150)   Continue amlodipine and losartan    Overweight (BMI 25.0-29.9)  Assessment & Plan  BMI 28.8  Counseled on diet, exercise, lifestyle changes           VTE Pharmacologic Prophylaxis: VTE Score: 8 High Risk (Score >/= 5) - Pharmacological DVT Prophylaxis Ordered: enoxaparin (Lovenox). Sequential Compression Devices Ordered.    Mobility:   Basic Mobility Inpatient Raw Score: 14  JH-HLM Goal: 4: Move to chair/commode  JH-HLM Achieved: 4: Move to chair/commode  JH-HLM Goal NOT achieved. Continue with multidisciplinary rounding and encourage appropriate mobility to improve upon JH-HLM goals.    Patient Centered Rounds: I performed bedside rounds with nursing staff today.   Discussions with Specialists or Other Care Team Provider: Neuro & CM     Education and Discussions with Family / Patient: Updated  (wife) at bedside.    Total Time Spent on Date of Encounter in care of patient: 65 mins. This time was spent on one or more of the following: performing physical exam; counseling and coordination of care; obtaining or reviewing history;  documenting in the medical record; reviewing/ordering tests, medications or procedures; communicating with other healthcare professionals and discussing with patient's family/caregivers.    Current Length of Stay: 1 day(s)  Current Patient Status: Inpatient   Certification Statement: The patient will continue to require additional inpatient hospital stay due to possible partial seizure disorder  Discharge Plan: Anticipate discharge in >72 hrs to discharge location to be determined pending rehab evaluations.    Code Status: Level 1 - Full Code    Subjective:   Is doing okay.  He admits feeling very fatigued.  He is tolerating p.o. intake.  He is passing gas but has not had a recent bowel movement.  He is urinating regularly.  He still notes profound weakness in his lower extremities and left upper extremity.  He still having tremors in left lower extremity.    Objective:     Vitals:   Temp (24hrs), Av.9 °F (36.6 °C), Min:97.4 °F (36.3 °C), Max:98.1 °F (36.7 °C)    Temp:  [97.4 °F (36.3 °C)-98.1 °F (36.7 °C)] 98 °F (36.7 °C)  HR:  [73-94] 94  Resp:  [18-20] 20  BP: (113-150)/() 146/98  SpO2:  [92 %-97 %] 95 %  Body mass index is 28.81 kg/m².     Input and Output Summary (last 24 hours):     Intake/Output Summary (Last 24 hours) at 2024 1225  Last data filed at 2024 1115  Gross per 24 hour   Intake 300 ml   Output 850 ml   Net -550 ml       Physical Exam:   Physical Exam  Constitutional:       Appearance: He is normal weight.   HENT:      Head: Normocephalic.      Right Ear: External ear normal.      Left Ear: External ear normal.      Mouth/Throat:      Mouth: Mucous membranes are moist.      Pharynx: Oropharynx is clear.   Eyes:      Extraocular Movements: Extraocular movements intact.      Conjunctiva/sclera: Conjunctivae normal.   Cardiovascular:      Rate and Rhythm: Normal rate and regular rhythm.      Pulses: Normal pulses.      Heart sounds: Normal heart sounds.   Pulmonary:      Effort:  Pulmonary effort is normal.      Breath sounds: Normal breath sounds.   Abdominal:      General: Abdomen is flat. Bowel sounds are normal. There is no distension.      Palpations: Abdomen is soft. There is no mass.      Tenderness: There is no abdominal tenderness.   Musculoskeletal:         General: Swelling (Trace) present. Normal range of motion.   Skin:     General: Skin is warm and dry.   Neurological:      Mental Status: He is alert and oriented to person, place, and time.      Comments: Tremors present in LLE - intention & at rest; 2/5 strength LLE/LLE   Psychiatric:         Mood and Affect: Mood normal.         Behavior: Behavior normal.          Additional Data:     Labs:  Results from last 7 days   Lab Units 04/22/24  0503   WBC Thousand/uL 7.34   HEMOGLOBIN g/dL 13.9   HEMATOCRIT % 42.3   PLATELETS Thousands/uL 128*   SEGS PCT % 65   LYMPHO PCT % 24   MONO PCT % 6   EOS PCT % 2     Results from last 7 days   Lab Units 04/22/24  0503 04/21/24  0426 04/20/24  1345   SODIUM mmol/L 139   < > 141   POTASSIUM mmol/L 3.7   < > 4.0   CHLORIDE mmol/L 104   < > 102   CO2 mmol/L 28   < > 23   BUN mg/dL 13   < > 16   CREATININE mg/dL 0.80   < > 0.95   ANION GAP mmol/L 7   < > 16*   CALCIUM mg/dL 9.0   < > 9.7   ALBUMIN g/dL  --   --  4.9   TOTAL BILIRUBIN mg/dL  --   --  1.09*   ALK PHOS U/L  --   --  48   ALT U/L  --   --  27   AST U/L  --   --  25   GLUCOSE RANDOM mg/dL 116   < > 142*    < > = values in this interval not displayed.     Results from last 7 days   Lab Units 04/20/24  1345   INR  0.92                   Lines/Drains:  Invasive Devices       Central Venous Catheter Line  Duration             Port A Cath 04/27/23 Right Subclavian 361 days              Peripheral Intravenous Line  Duration             Peripheral IV 04/20/24 Left Antecubital 1 day    Peripheral IV 04/21/24 Distal;Right;Upper;Ventral (anterior) Arm <1 day                    Central Line:  Goal for removal:  chronic - CA dx           Telemetry:  Telemetry Orders (From admission, onward)               24 Hour Telemetry Monitoring  Continuous x 24 Hours (Telem)           Question:  Reason for 24 Hour Telemetry  Answer:  TIA/Suspected CVA/ Confirmed CVA                     Telemetry Reviewed: Normal Sinus Rhythm  Indication for Continued Telemetry Use: In need of video EEG              Imaging: Reviewed radiology reports from this admission including: MRI brain    Recent Cultures (last 7 days):         Last 24 Hours Medication List:   Current Facility-Administered Medications   Medication Dose Route Frequency Provider Last Rate    acetaminophen  650 mg Oral Q6H PRN Bharti Hollenbaugh, CRNP      amLODIPine  10 mg Oral Daily Bharti Hollenbaugh, CRNP      atorvastatin  40 mg Oral After Dinner Bharti Maximino, CRNP      dexamethasone  1 mg Oral Daily Bharti Hollenbaugh, CRNP      enoxaparin  40 mg Subcutaneous Daily Bharti Israelbaugh, CRNP      lacosamide  100 mg Intravenous Q12H Tanya Maradiaga PA-C      lacosamide  200 mg Intravenous Once Tanya Maradiaga PA-C      levETIRAcetam  2,000 mg Intravenous Q12H [facility] Cole Salcedo MD      losartan  50 mg Oral Daily Bharti Hollenbaugh, CRNP      ondansetron  4 mg Intravenous Q6H PRN Bharti Israelbaugh, CRNP      pantoprazole  40 mg Oral Daily Bharti Hollenbaugh, CRNP      pramipexole  0.25 mg Oral TID Mey Omalley PA-C          Today, Patient Was Seen By: Tanya Maradiaga PA-C    **Please Note: This note may have been constructed using a voice recognition system.**

## 2024-04-22 NOTE — UTILIZATION REVIEW
Initial Clinical Review    WAS OBSERVATION 4/20/24 @ 1556 CONVERTED TO INPATIENT ADMISSION 4/21/24 @ 0921 DUE TO CONTINUED STAY REQUIRED TO CARE FOR PATIENT WITH DX: TREMORS.    Admission: Date/Time/Statement:   Admission Orders (From admission, onward)       Ordered        04/21/24 0921  INPATIENT ADMISSION  Once            04/20/24 1556  Place in Observation  Once                          Orders Placed This Encounter   Procedures    INPATIENT ADMISSION     Standing Status:   Standing     Number of Occurrences:   1     Order Specific Question:   Level of Care     Answer:   Med Surg [16]     Order Specific Question:   Estimated length of stay     Answer:   More than 2 Midnights     Order Specific Question:   Certification     Answer:   I certify that inpatient services are medically necessary for this patient for a duration of greater than two midnights. See H&P and MD Progress Notes for additional information about the patient's course of treatment.     ED Arrival Information       Expected   -    Arrival   4/20/2024 13:25    Acuity   Emergent              Means of arrival   Ambulance    Escorted by   [facility]    Service   Hospitalist    Admission type   Emergency              Arrival complaint   weakness             Chief Complaint   Patient presents with    Weakness - Generalized     Left sided weakness for a couple weeks    Tremors     Uncontrollable tremors of left leg and left arm. Intermittent  being treated with steroids, Patient reports the DR cut his dose in half a week ago and that's when these symptoms occurred.        Initial Presentation: 68 y.o. male to ED via EMS from home  Present to ED with a known history of metastatic lung cancer to the brain, and is a patient that presented to the emergency room earlier today with left-sided weakness, and tremors. He was admitted for the same. continues to complain of some residual left-sided weakness. He complains of numbness, tingling, and tremors  of both his left upper and lower extremities. He also endorses weakness. Strokelike symptoms-prior to being admitted to medicine here, case was reviewed by the ED physician with the neurology team. At this time there is no indication for transfer for continuous video EEG as per neurology.   PMHX: CVA, metastatic adenocarcinoma, essential hypertension, hypercholesteremia. S/p robotic converted to right thoracotomy and right upper lobectomy on 9/1/2023; Received chemotherapy/immunotherapy and surgical resection of brain lesion followed by SRS; PET scan 3/25/2024: FDG-avid mediastinal lymphadenopathy as seen on recent CT compatible with metastasis. 4/16/24 had bronchoscopy and EBUS biopsy of 3 lymph nodes at [facility]  Admitted to [facility] with DX: Tremors of nervous system   on exam: hypertensive; numbness in his left arm and leg. Patient noted to have some weakness 4 out of 5 motor both left upper and lower extremity. new onset left upper and lower extremity tremors; AG 16   CT Right frontal lobe vasogenic edema again identified, known lesion better seen on recent MRI. No significant mass effect.   PLAN: cont keppra; cont iv ativan; tele monitoring; monitor labs; neuro checks; f/u MRI; Accuchecks with ssic; PT/ OT eval - tx      Date: 4/21/24 - CHANGED TO INPATIENT   patient complaining of increasing left lower extremity tremors. continues to have ongoing tremors specifically more so of the left lower extremity.  MRI brain has been ordered-this should be completed at approximately 12 noon today . With metastatic disease to the brain-continue dexamethasone . Exam: Left upper extremity 4 out of 5 motor; Left lower extremity 3 out of 5 motor  Significant tremors noted of the left lower extremity. K 3.4  Plan: cont keppra; cont iv ativan; tele monitoring; monitor labs; neuro checks; f/u MRI; Accuchecks with ssic; PT/ OT eval - tx; neurology consult      NEUROLOGY CONSULT   EPC. (Epilepsia Partialis Continua).  New onset seizure  activity.  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  No clear-cut toxic metabolic derangements, new medications, change in his daily routine or sleep-wake cycle, nor recent infectious symptoms that could be lowering the seizure threshold further.       Tumor itself was fully resected in March of last year at [facility].  No evidence of recurrence thus far.  Some improvement in involuntary left-sided activity compared to yesterday however still continuous every couple seconds in the left lower extremity mild there was a brief period of 2 to 3 hours yesterday per patient.  Symptoms had resolved may have been from a combination of Ativan that he had yesterday afternoon at 1:30 PM and Keppra in the system.     Case discussed with on-call epilepsy team as well there is room to go up with the Keppra and will add Ativan.     He also has significant weakness in the left upper and lower extremities more so on the left lower extremity compared to the left upper extremity.  Also has subjective numbness in both the left upper and left lower extremities more so than the left side.     Appears quite a patient that the strength in the left upper extremity is improving more so than in the left lower extremity as he could barely lift the left lower extremity of the bed with great effort and to the left upper extremity could raise it up to little bit above his shoulder height level.     Head CT yesterday in ER showing right frontal lobe vasogenic edema again identified with no significant mass effect or evidence of any hemorrhage.  Postoperative changes right frontal region again noted.       PLAN:   MRI BRAIN w/wo contras pending.  If MRI brain scan looks worse compared to the March 26 scans then would recommend consulting oncology.    Date: 4/22/24      Day 2 - INPATIENT  Reviewed with on-call neurologist.  Patient would benefit from transfer to [place] to obtain EEG.  Ativan discontinued; Vimpat bolus ordered; Vimpat  scheduled dosing ordered.    NEUROLOGY:   Per primary team, patient continued to have left lower extremity rhythmic movement occurs every few seconds.  Wife also reported patient being more lethargic.  MRI brain with and without,  Right superior frontal mass resection and chemoradiation with unchanged linear enhancement along the surgical cavity compared to 3/26/2024 favoring radiation necrosis. Residual/recurrent disease is felt to be less likely.   Suspect partial seizure status. -Etiology suspect related with radiation necrosis versus recurrent/residual disease.  Discussed with the primary team, please reach out to oncology/radio oncology regarding further oncology management.  May consider to increase Decadron dose if indicated  Plan: Discussed with the primary team, please hold Ativan for now.  Recommend to load the patient with Vimpat 200 mg now.  Continue with 100 mg twice daily. As patient has been in status for 2 days and appeared to be more lethargic today, discussed with primary team and the EMU, recommend patient to be transferred to [place] for video EEG monitoring to guide AED escalation.         ED Triage Vitals   Temperature Pulse Respirations Blood Pressure SpO2   04/20/24 1615 04/20/24 1330 04/20/24 1330 04/20/24 1330 04/20/24 1330   97.6 °F (36.4 °C) 99 18 (!) 179/88 96 %      Temp Source Heart Rate Source Patient Position - Orthostatic VS BP Location FiO2 (%)   04/20/24 1615 04/20/24 1330 04/20/24 1330 04/20/24 1330 --   Temporal Monitor Sitting Left arm       Pain Score       04/20/24 1330       3          Wt Readings from Last 1 Encounters:   04/20/24 88.5 kg (195 lb 1.7 oz)     Additional Vital Signs:     Date/Time Temp Pulse Resp BP MAP (mmHg) SpO2 O2 Device Patient Position - Orthostatic VS   04/22/24 1100 98 °F (36.7 °C) -- 20 146/98 -- -- -- --   04/22/24 08:09:02 -- 94 -- 150/105 Abnormal  120 95 % -- --   04/22/24 0700 98.1 °F (36.7 °C) 83 19 -- -- -- -- --   04/21/24 2300 97.9 °F  (36.6 °C) 73 20 113/71 85 92 % None (Room air) Lying   04/21/24 22:24:50 97.9 °F (36.6 °C) 73 20 113/71 85 92 % -- --   04/21/24 15:04:03 -- 78 -- 126/88 101 97 % -- --   04/21/24 1500 97.4 °F (36.3 °C) Abnormal  -- 18 -- -- -- -- --   04/21/24 11:21:16 98 °F (36.7 °C) 80 18 135/86 102 96 % -- --   04/21/24 1100 98 °F (36.7 °C) -- 18 135/86 102 -- None (Room air) Lying   04/21/24 0700 97.2 °F (36.2 °C) Abnormal  -- 20 -- -- -- None (Room air) Lying   04/21/24 0300 97.9 °F (36.6 °C) -- 22 144/94 111 -- -- Lying   04/20/24 23:27:30 97.7 °F (36.5 °C) 67 20 141/87 105 97 % -- --   04/20/24 1815 98.1 °F (36.7 °C) -- 18 -- -- -- None (Room air) Sitting   04/20/24 1741 97.8 °F (36.6 °C) 79 18 130/82 -- -- -- --   04/20/24 1715 97.8 °F (36.6 °C) -- 18 130/82 -- -- None (Room air) Sitting   04/20/24 16:22:02 -- 79 -- 128/81 97 97 % -- --   04/20/24 1615 97.6 °F (36.4 °C) 79 18 128/81 97 97 % -- Sitting   04/20/24 1550 -- 77 -- 137/86 106 95 % -- --   04/20/24 1531 -- 76 -- 130/73 92 93 % -- --   04/20/24 1515 -- 76 16 106/61 76 92 % -- --   04/20/24 1500 -- 85 -- 106/65 82 94 % -- --   04/20/24 1445 -- 81 -- 127/74 95 91 % -- --   04/20/24 1430 -- 87 -- 104/57 71 90 % -- --   04/20/24 1415 -- 97 -- 117/77 93 93 % -- --   04/20/24 1330 -- 99 18 179/88 Abnormal  -- 96 % None (Room air) Sitting       EKG: Normal sinus rhythm  Possible Left atrial enlargement  Nonspecific ST and T wave abnormality  When compared with ECG of 29-JUN-2023 12:26,  WA interval has decreased  Vent. rate has increased BY  34 BPM  T wave inversion no longer evident in Inferior leads      Pertinent Labs/Diagnostic Test Results:   MRI brain w wo contrast   Final Result by Ramiro Black MD (04/21 1522)      Right superior frontal mass resection and chemoradiation with unchanged linear enhancement along the surgical cavity compared to 3/26/2024 favoring radiation necrosis. Residual/recurrent disease is felt to be less likely. Stable surrounding  hyperintense    T2/FLAIR signal likely related to a combination of posttreatment changes and resolving vasogenic edema. There is no new suspicious intra-axial lesion.      Mild to moderate chronic microangiopathic ischemic changes.      Workstation performed: [id number]         CT head without contrast   Final Result by Ace Ordoñez MD (04/20 1454)      Right frontal lobe vasogenic edema again identified, known lesion better seen on recent MRI. No significant mass effect.                  Workstation performed: [id number]              Results from last 7 days   Lab Units 04/22/24  0503 04/21/24  0426 04/20/24  1345   WBC Thousand/uL 7.34 7.92 11.63*   HEMOGLOBIN g/dL 13.9 13.6 15.3   HEMATOCRIT % 42.3 40.5 46.3   PLATELETS Thousands/uL 128* 124* 166   TOTAL NEUT ABS Thousands/µL 4.76  --   --         Results from last 7 days   Lab Units 04/22/24  0503 04/21/24  0426 04/20/24  1345   SODIUM mmol/L 139 141 141   POTASSIUM mmol/L 3.7 3.4* 4.0   CHLORIDE mmol/L 104 105 102   CO2 mmol/L 28 28 23   ANION GAP mmol/L 7 8 16*   BUN mg/dL 13 12 16   CREATININE mg/dL 0.80 0.76 0.95   EGFR ml/min/1.73sq m 90 92 80   CALCIUM mg/dL 9.0 9.0 9.7   MAGNESIUM mg/dL  --   --  1.9   PHOSPHORUS mg/dL  --   --  3.5     Results from last 7 days   Lab Units 04/20/24  1345   AST U/L 25   ALT U/L 27   ALK PHOS U/L 48   TOTAL PROTEIN g/dL 7.3   ALBUMIN g/dL 4.9   TOTAL BILIRUBIN mg/dL 1.09*        Results from last 7 days   Lab Units 04/22/24  0503 04/21/24  0426 04/20/24  1345   GLUCOSE RANDOM mg/dL 116 108 142*        Results from last 7 days   Lab Units 04/20/24  1345   PROTIME seconds 12.6   INR  0.92   PTT seconds 22*          ED Treatment:   Medication Administration from 04/20/2024 1324 to 04/20/2024 1614         Date/Time Order Dose Route Action     04/20/2024 1347 EDT LORazepam (ATIVAN) injection 1 mg 1 mg Intravenous Given     04/20/2024 1553 EDT levETIRAcetam (KEPPRA) injection 1,000 mg 1,000 mg Intravenous Given             Present on Admission:   Metastatic adenocarcinoma (HCC)   Overweight (BMI 25.0-29.9)   Carcinoma of right lung (HCC)   Essential hypertension   Hypercholesterolemia      Admitting Diagnosis: Partial seizures (HCC) [R56.9]  Weakness [R53.1]  Metastatic lung cancer (metastasis from lung to other site) (HCC) [C34.90]    Age/Sex: 68 y.o. male    Admission Orders: SCDs; neuro checks; cardiac diet    Scheduled Medications:  amLODIPine, 10 mg, Oral, Daily  atorvastatin, 40 mg, Oral, After Dinner  dexamethasone, 1 mg, Oral, Daily  enoxaparin, 40 mg, Subcutaneous, Daily  lacosamide, 100 mg, Intravenous, Q12H  lacosamide, 200 mg, Intravenous, Once  levETIRAcetam, 2,000 mg, Intravenous, Q12H LEN  losartan, 50 mg, Oral, Daily  pantoprazole, 40 mg, Oral, Daily  pramipexole, 0.25 mg, Oral, TID  LORazepam (ATIVAN) injection 1 mg, intravenous, Q8 H         Continuous IV Infusions: None       PRN Meds:  acetaminophen, 650 mg, Oral, Q6H PRN  ondansetron, 4 mg, Intravenous, Q6H PRN        IP CONSULT TO NEUROLOGY    Network Utilization Review Department  ATTENTION: Please call with any questions or concerns to [phone] and carefully listen to the prompts so that you are directed to the right person. All voicemails are confidential.   For Discharge needs, contact Care Management DC Support Team at [phone] opt. [phone]  Send all requests for admission clinical reviews, approved or denied determinations and any other requests to dedicated fax number below belonging to the campus where the patient is receiving treatment. List of dedicated fax numbers for the Facilities:  FACILITY NAME UR FAX NUMBER   ADMISSION DENIALS (Administrative/Medical Necessity) [phone]   DISCHARGE SUPPORT TEAM (NETWORK) [phone]   PARENT CHILD HEALTH (Maternity/NICU/Pediatrics) [phone]   [facility] [phone]   [facility] [phone]   [facility]  [phone]   [facility] [phone]   Pending sale to [facility] [phone]   [facility] [phone]   [facility] [phone]   [facility] [phone]   [facility] [phone]   [facility] [phone]   [facility] [phone]   [facility] [phone]

## 2024-04-22 NOTE — ASSESSMENT & PLAN NOTE
Patient is s/p robotic converted to right thoracotomy and right upper lobectomy on 9/1/2023 for poorly differentiated adenocarcinoma per patient's records   s/p bronchoscopy and EBUS biopsy of 3 lymph nodes at [facility] on 4/16/2024  Continue methadone; patient was on taper prior to arrival  Patient will continue to follow with hematology/oncology in outpatient setting

## 2024-04-22 NOTE — TRANSPORTATION MEDICAL NECESSITY
"Section I - General Information    Name of Patient: Paras Pitts                 : 1955    Medicare #: [id number]  Transport Date: 24 (PCS is valid for round trips on this date and for all repetitive trips in the 60-day range as noted below.)  Origin: [facility]                                                         Destination: [facility]  Is the pt's stay covered under Medicare Part A (PPS/DRG)   [x]     Closest appropriate facility? If no, why is transport to more distant facility required? Yes  If hospice pt, is this transport related to pt's terminal illness? NA       Section II - Medical Necessity Questionnaire  Ambulance transportation is medically necessary only if other means of transport are contraindicated or would be potentially harmful to the patient. To meet this requirement, the patient must either be \"bed confined\" or suffer from a condition such that transport by means other than ambulance is contraindicated by the patient's condition. The following questions must be answered by the medical professional signing below for this form to be valid:    1)  Describe the MEDICAL CONDITION (physical and/or mental) of this patient AT THE TIME OF AMBULANCE TRANSPORT that requires the patient to be transported in an ambulance and why transport by other means is contraindicated by the patient's condition:Partial Seizures    2) Is the patient \"bed confined\" as defined below?     Yes  To be \"be confined\" the patient must satisfy all three of the following conditions: (1) unable to get up from bed without Assistance; AND (2) unable to ambulate; AND (3) unable to sit in a chair or wheelchair.    3) Can this patient safely be transported by car or wheelchair van (i.e., seated during transport without a medical attendant or monitoring)?   No    4) In addition to completing questions 1-3 above, please check any of the following conditions that apply*: "   *Note: supporting documentation for any boxes checked must be maintained in the patient's medical records.  If hosp-hosp transfer, describe services needed at 2nd facility not available at 1st facility?   Medical attendant required   Hemodynamic monitoring required en route      Section III - Signature of Physician or Healthcare Professional  I certify that the above information is true and correct based on my evaluation of this patient, and represent that the patient requires transport by ambulance and that other forms of transport are contraindicated. I understand that this information will be used by the Centers for Medicare and Medicaid Services (CMS) to support the determination of medical necessity for ambulance services, and I represent that I have personal knowledge of the patient's condition at time of transport.    [x]  If this box is checked, I also certify that the patient is physically or mentally incapable of signing the ambulance service's claim and that the institution with which I am affiliated has furnished care, services, or assistance to the patient.    My signature below is made on behalf of the patient pursuant to 42 CFR §424.36(b)(4). In accordance with 42 CFR §424.37, the specific reason(s) that the patient is physically or mentally incapable of signing the claim form is as follows:      Signature of Physician* or Healthcare Professional______________________________________________________________  Signature Date 04/22/24 (For scheduled repetitive transports, this form is not valid for transports performed more than 60 days after this date)    Printed Name & Credentials of Physician or Healthcare Professional (MD, , RN, etc.)__Zaynab Smith RN______________________________  *Form must be signed by patient's attending physician for scheduled, repetitive transports. For non-repetitive, unscheduled ambulance transports, if unable to obtain the signature of the attending physician, any of the  following may sign (choose appropriate option below)  [] Physician Assistant []  Clinical Nurse Specialist []  Registered Nurse  []  Nurse Practitioner  [x] Discharge Planner

## 2024-04-22 NOTE — ASSESSMENT & PLAN NOTE
S/p robotic converted to right thoracotomy and right upper lobectomy on 09/01/23  With metastatic disease to the brain  Continue dexamethasone  Received chemotherapy/immunotherapy and surgical resection of brain lesion followed by SRS  PET scan 3/25/2024: FDG-avid mediastinal lymphadenopathy as seen on recent CT compatible with metastasis.  4/16/24 had bronchoscopy and EBUS biopsy of 3 lymph nodes at [facility]  MRI brain as above   Will continue to follow with hematology/oncology in outpatient setting

## 2024-04-22 NOTE — EMTALA/ACUTE CARE TRANSFER
[facility]  [address] [place] [address]  Dept: [phone]      ACUTE CARE TRANSFER CONSENT    NAME Paras Pitts                                         1955                              MRN [id number]    I have been informed of my rights regarding examination, treatment, and transfer   by Dr. Evy Washington, *    Benefits:  Specialty care; video EEG    Risks:  Worsening condition, permanent disability, injury during transfer      Transfer Request:  I acknowledge that my medical condition has been evaluated and explained to me by the treating physician or other qualified medical person and/or my attending physician who has recommended and offered to me further medical examination and treatment. I understand the Hospital's obligation with respect to the treatment and stabilization of my medical condition. I nevertheless request to be transferred. I release the Hospital, the doctor, and any other persons caring for me from all responsibility or liability for any injury or ill effects that may result from my transfer and agree to accept all responsibility for the consequences of my choice to transfer, rather than receive stabilizing treatment at the Hospital. I understand that because the transfer is my request, my insurance may not provide reimbursement for the services.  The Hospital will assist and direct me and my family in how to make arrangements for transfer, but the hospital is not liable for any fees charged by the transport service.  In spite of this understanding, I refuse to consent to further medical examination and treatment which has been offered to me, and request transfer to  . I authorize the performance of emergency medical procedures and treatments upon me in both transit and upon arrival at the receiving facility.  Additionally, I authorize the release of any and all medical records to the receiving facility and request they be  transported with me, if possible.    I authorize the performance of emergency medical procedures and treatments upon me in both transit and upon arrival at the receiving facility.  Additionally, I authorize the release of any and all medical records to the receiving facility and request they be transported with me, if possible.  I understand that the safest mode of transportation during a medical emergency is an ambulance and that the Hospital advocates the use of this mode of transport. Risks of traveling to the receiving facility by car, including absence of medical control, life sustaining equipment, such as oxygen, and medical personnel has been explained to me and I fully understand them.    (DOUG CORRECT BOX BELOW)  [  ]  I consent to the stated transfer and to be transported by ambulance/helicopter.  [  ]  I consent to the stated transfer, but refuse transportation by ambulance and accept full responsibility for my transportation by car.  I understand the risks of non-ambulance transfers and I exonerate the Hospital and its staff from any deterioration in my condition that results from this refusal.    X___________________________________________    DATE  24  TIME________  Signature of patient or legally responsible individual signing on patient behalf           RELATIONSHIP TO PATIENT_________________________          Provider Certification    NAME Paras Pitts                                        [employer] 1955                              MRN [id number]    A medical screening exam was performed on the above named patient.  Based on the examination:    Condition Necessitating Transfer tremors; possible seizure activity    Patient Condition:  Stable    Reason for Transfer:  Need for video EEG    Transfer Requirements: Facility     Space available and qualified personnel available for treatment as acknowledged by    Agreed to accept transfer and to provide appropriate medical treatment as acknowledged  by          Appropriate medical records of the examination and treatment of the patient are provided at the time of transfer   STAFF INITIAL WHEN COMPLETED _______  Transfer will be performed by qualified personnel from    and appropriate transfer equipment as required, including the use of necessary and appropriate life support measures.    Provider Certification: I have examined the patient and explained the following risks and benefits of being transferred/refusing transfer to the patient/family:         Based on these reasonable risks and benefits to the patient and/or the unborn child(tera), and based upon the information available at the time of the patient’s examination, I certify that the medical benefits reasonably to be expected from the provision of appropriate medical treatments at another medical facility outweigh the increasing risks, if any, to the individual’s medical condition, and in the case of labor to the unborn child, from effecting the transfer.    X____________________________________________ DATE 04/22/24        TIME_______      ORIGINAL - SEND TO MEDICAL RECORDS   COPY - SEND WITH PATIENT DURING TRANSFER

## 2024-04-22 NOTE — ASSESSMENT & PLAN NOTE
Patient was originally admitted to the [facility] on 4/20/2024 after acute onset tremors of the right upper and lower extremity.  He does have a history of stroke with left-sided weakness and his new tremors were thought to be related to his previous neurologic injury.  Initial stroke workup was negative and neurology was consulted.  Imaging was obtained including MRI that showed no additional evidence of stroke/TIA. Neurology then began an evaluation for seizure like activity in this patient because of his history of brain metastasis and surgical resection of brain mets.  He was loaded with Keppra and was admitted for close monitoring.  Despite his initial AED dosing the patient's tremors continued.  At this point neurology increased his AED medication regimen with the addition of Vimpat to his twice daily Keppra. He was loaded with 200 mg Vimpat and will continue 100 mg twice daily. Neurology also recommended transfer to [facility] for video EEG monitoring and further titration of antiepileptic drugs.    Assessment: New onset partial seizures, concern for status     Plan:  - Neurology consult on arrival  - Video EEG ordered on arrival  - Continue Keppra 2 g twice daily  - Continue Vimpat 100 mg twice daily  - Neurochecks every 4  - Close hemodynamic monitoring

## 2024-04-22 NOTE — ASSESSMENT & PLAN NOTE
Patient presenting with generalized weakness in addition to his left-sided tremors. LUE paralysis at b/l LLE near paralysis.    Assessment: weakness likely multifactorial in the setting of new onset seizures (postictal?) and metastatic lung cancer receiving chemotherapy and radiation    Plan:  - PT / OT consult  - Case management consult

## 2024-04-22 NOTE — ASSESSMENT & PLAN NOTE
Patient has a history of stroke and has left-sided weakness as a long-term sequelae.    Assessment: History of CVA with residual left-sided weakness    Plan:  - Continue statin

## 2024-04-22 NOTE — H&P
[facility]  H&P  Name: Paras Pitts 68 y.o. male I MRN: [id number]  Unit/Bed#: [facility] 721-01 I Date of Admission: 4/22/2024   Date of Service: 4/22/2024 I Hospital Day: 0      Assessment/Plan   * Tremors of nervous system  Assessment & Plan  Patient was originally admitted to the [facility] on 4/20/2024 after acute onset tremors of the right upper and lower extremity.  He does have a history of stroke with left-sided weakness and his new tremors were thought to be related to his previous neurologic injury.  Initial stroke workup was negative and neurology was consulted.  Imaging was obtained including MRI that showed no additional evidence of stroke/TIA. Neurology then began an evaluation for seizure like activity in this patient because of his history of brain metastasis and surgical resection of brain mets.  He was loaded with Keppra and was admitted for close monitoring.  Despite his initial AED dosing the patient's tremors continued.  At this point neurology increased his AED medication regimen with the addition of Vimpat to his twice daily Keppra. He was loaded with 200 mg Vimpat and will continue 100 mg twice daily. Neurology also recommended transfer to [facility] for video EEG monitoring and further titration of antiepileptic drugs.    Assessment: New onset partial seizures, concern for status     Plan:  - Neurology consult on arrival  - Video EEG ordered on arrival  - Continue Keppra 2 g twice daily  - Continue Vimpat 100 mg twice daily  - Neurochecks every 4  - Close hemodynamic monitoring      Metastatic adenocarcinoma (HCC)  Assessment & Plan  History of metastatic lung adenocarcinoma w/ mets to brain. Most recent staging: Stage JAY (cT3, cN1, cM1b)  He has undergone multiple rounds of chemotherapy and radiation and has undergone surgical resection for the metastatic brain lesion.  A PET scan on 3/25/2024 showed uptake in mediastinal lymph nodes compatible with  metastasis.  Repeat MRI brain during current hospitalization showed stable postsurgical changes and lack of new lesions.  He has been taking Decadron at home for vasogenic edema of the brain.    Patient now presenting with new seizure activity.  He has been evaluated at the [facility] by our neurology colleagues.  He has been started on antiepileptic drugs but has continued to have breakthrough seizures.  He is being transferred to [facility] for video EEG monitoring and additional AED titration.    Assessment: Metastatic adenocarcinoma of the lung with metastasis to the brain s/p surgical resection and SRS on daily Decadron    Plan:  - Continue Decadron at 1 mg/day  - Neurology will weigh in on oncology consultation while hospitalized. On initial evaluation no immediate need for oncology recommendations      Carcinoma of right lung (HCC)  Assessment & Plan  Patient was diagnosed with adenocarcinoma of the right lung in 2023; stage Stage JAY (cT3, cN1, cM1b)   He is s/p robotic converted to right Thoracotomy and right upper lobectomy on 9/1/2023.  He has also undergone bronchoscopy with [facility] on 4/16/2024.  Initial pathology showing metastatic lung cell carcinoma in the most recent biopsies.  He follows with [facility] hematology oncology for ongoing cancer management and chemotherapy.    Assessment: Right lung adenocarcinoma with metastasis to brain and mediastinal lymph nodes    Plan:  - Neurology to weigh in on inpatient heme-onc consult  - If no inpatient consult needed, continue outpatient treatment      History of CVA (cerebrovascular accident)  Assessment & Plan  Patient has a history of stroke and has left-sided weakness as a long-term sequelae.    Assessment: History of CVA with residual left-sided weakness    Plan:  - Continue statin    Generalized weakness  Assessment & Plan  Patient presenting with generalized weakness in addition to his left-sided tremors. LUE paralysis at  b/l LLE near paralysis.    Assessment: weakness likely multifactorial in the setting of new onset seizures (postictal?) and metastatic lung cancer receiving chemotherapy and radiation    Plan:  - PT / OT consult  - Case management consult    Essential hypertension  Assessment & Plan  Patient has known history of hypertension, no issues with blood pressure prior to transfer.  Home regimen: Amlodipine, losartan    Assessment: Hypertension, well-controlled    Plan:  - Continue amlodipine and losartan    Hypercholesterolemia  Assessment & Plan  Continue home statin           H&P Exam - Paras Pitts 68 y.o. male MRN: [id number]  Unit/Bed#: [facility] 721-01 Encounter: [id number]      DATE: 4/22/2024  TIME: 10:12 PM  Primary Care Physician: Maikel Brower DO  Admitting Provider: Elaine Yadav DO    History of Present Illness   HPI:  Paras Pitts is a 68 y.o. male who presents with new onset left-sided tremors.  Has an extensive cancer history after an initial diagnosis of right lung adenocarcinoma in 2023.  Since that time he has undergone multiple rounds of chemotherapy and radiation.  He follows with [facility] hematology oncology and [facility] radiation oncology. He also has a history of CVA in 2011 and has residual left-sided weakness from that neurologic injury.  His other chronic medical comorbidities including hypertension and hyperlipidemia are well-controlled at this time.    He is presenting to [facility] as a transfer from the [facility] after he began with left-sided tremors on 4/17/2024. He described it as uncontrollable twitching in his left arm and left leg.  He denied any preceding events, injuries, or traumas.  He did not present to the emergency department until approximately 3 days after the tremors began.    Initial evaluation in the [facility] emergency department was unremarkable.  His vital signs were stable, his lab work was within normal limits, and a stat CT head  "showed stable changes from his recent brain metastasis resection.  Because of the severity of symptoms he was admitted for further observation and monitoring.    Neurology was consulted who do not think the patient's symptoms were secondary to a stroke but more likely due to seizures.  He was loaded with Keppra and monitored closely.  Despite the antiepileptic drug dosing he continued to have severe and frequent twitching in the left arms and legs.  Neurology then escalated the Keppra dosing and added Vimpat for additional seizure control.  At this point, the working diagnosis was partial seizures and status epilepticus.    Further evaluation was felt to be beneficial for the patient and he was transferred to [facility] for video EEG monitoring.  Dr. Guillen of epileptologist approved to transfer and agreed with this recommendation.    He arrived to [facility] in stable condition. He mentioned that he felt as if he had double vision when looking at cars very far away during the ambulance ride over. However, this had resolved by our examination. He continued to have LLE twitching but no LUE symptoms. He continues to have no intentional L sided movement. Sensation intact b/l. Belly firm and distended but non-tender without masses. Patient states he has \"a gut\" from being on steroids and that is why his abdomen appears that way. Umbilical hernia present non-tender and reducible. Denied any pain, N/V/D, CP, SOB, fevers or chills.      Review of Systems   Constitutional:  Positive for activity change. Negative for appetite change, chills, diaphoresis, fatigue and unexpected weight change.   HENT: Negative.     Eyes: Negative.    Respiratory: Negative.  Negative for cough and shortness of breath.    Cardiovascular: Negative.  Negative for chest pain and palpitations.   Gastrointestinal:  Positive for constipation. Negative for diarrhea, nausea and vomiting.   Endocrine: Negative.    Genitourinary: " Negative.  Negative for dysuria and frequency.   Musculoskeletal:  Positive for gait problem.        Generalized weakness   Skin: Negative.    Allergic/Immunologic: Negative.    Neurological:  Positive for tremors and weakness. Negative for dizziness, seizures, syncope, facial asymmetry, speech difficulty, light-headedness, numbness and headaches.        Numbness in left arm and leg   Hematological: Negative.    Psychiatric/Behavioral: Negative.         Historical Information   Past Medical History:   Diagnosis Date    Brain compression (HCC) 03/20/2023    Brain mass 03/20/2023    Cancer (HCC) 2001    Receint lung and brain lesions and prostate cancer in 2001    Cerebral edema (HCC) 03/20/2023    Hypertension     Prostate cancer (HCC) 2001    Rectal bleeding     Stroke (HCC)     2011       Past Surgical History:   Procedure Laterality Date    COLONOSCOPY      CRANIOTOMY Right 3/23/2023    Procedure: Right frontal CRANIOTOMY IMAGE-GUIDED FOR TUMOR;  Surgeon: Clark Carrillo MD;  Location: [facility];  Service: Neurosurgery    ENDOBRONCHIAL ULTRASOUND (EBUS) N/A 4/16/2024    Procedure: ENDOBRONCHIAL ULTRASOUND (EBUS);  Surgeon: Kalia Sparrow MD;  Location: [facility];  Service: Thoracic    IR PORT PLACEMENT  4/27/2023    ME [facility] INCL FLUOR GDNCE DX W/CELL WASHG SPX N/A 9/1/2023    Procedure: BRONCHOSCOPY FLEXIBLE;  Surgeon: Kalia Sparrow MD;  Location: [facility];  Service: Thoracic    [facility] W/CELL WASHG SPX N/A 4/16/2024    Procedure: BRONCHOSCOPY FLEXIBLE;  Surgeon: Kalia Sparrow MD;  Location: [facility];  Service: Thoracic    ME CYSTOURETHROSCOPY N/A 3/23/2023    Procedure: EUA, DEL CASTILLO INSERTION;  Surgeon: Ajay Piedra MD;  Location[facility];  Service: Urology    ME THORACOSCOPY W/LOBECTOMY SINGLE LOBE Right 9/1/2023    Procedure: robotic assisted converted to open right upper lobectomy, lymph node dissection;  Surgeon: Kalia Sparrow MD;  [facility];   Service: Thoracic    PROSTATECTOMY  2001    THORACOTOMY Right 2023    Procedure: right thoracotomy with Cryo-Ablation;  Surgeon: Kalia Sparrow MD;  Location: [facility];  Service: Thoracic    TONSILLECTOMY       Social History   Social History     Substance and Sexual Activity   Alcohol Use Not Currently    Alcohol/week: 1.0 standard drink of alcohol    Types: 1 Shots of liquor per week    Comment: socially     Social History     Substance and Sexual Activity   Drug Use Yes    Types: Marijuana    Comment: gummies foro nausea with chemo     Social History     Tobacco Use   Smoking Status Former    Current packs/day: 0.00    Average packs/day: 1 pack/day for 15.0 years (15.0 ttl pk-yrs)    Types: Cigarettes    Start date:     Quit date:     Years since quittin.3    Passive exposure: Never   Smokeless Tobacco Never   Tobacco Comments    i quit when i was 28. not sure of exact dates     Family History: non-contributory    Meds/Allergies   all medications and allergies reviewed  No Known Allergies    Objective   Vitals: Blood pressure 142/94, pulse 83, temperature 97.5 °F (36.4 °C), resp. rate 16, SpO2 96%.      Intake/Output Summary (Last 24 hours) at 2024  Last data filed at 2024 2111  Gross per 24 hour   Intake --   Output 50 ml   Net -50 ml       Invasive Devices       Central Venous Catheter Line  Duration             Port A Cath 23 Right Subclavian 361 days              Peripheral Intravenous Line  Duration             Peripheral IV 24 Left Antecubital 2 days    Peripheral IV 24 Distal;Right;Upper;Ventral (anterior) Arm 1 day                    Physical Exam  Vitals and nursing note reviewed.   Constitutional:       General: He is not in acute distress.     Appearance: Normal appearance. He is not ill-appearing.   HENT:      Head: Normocephalic.      Nose: Nose normal.      Mouth/Throat:      Mouth: Mucous membranes are moist.   Eyes:      Extraocular  Movements: Extraocular movements intact.   Cardiovascular:      Rate and Rhythm: Normal rate and regular rhythm.      Heart sounds: Normal heart sounds.   Pulmonary:      Effort: Pulmonary effort is normal.      Breath sounds: Normal breath sounds. No wheezing, rhonchi or rales.   Abdominal:      General: Bowel sounds are normal. There is distension.      Palpations: There is no mass.      Tenderness: There is no abdominal tenderness. There is no guarding.      Hernia: A hernia is present.      Comments: Distended firm abdomen.   Musculoskeletal:         General: No swelling.      Comments: Normal sensation b/l but LUE paralysis. LLE without volunteer movement but intermittent twitching once every 3 to 20 seconds.   Skin:     General: Skin is warm.   Neurological:      Mental Status: He is alert and oriented to person, place, and time.   Psychiatric:         Mood and Affect: Mood normal.         Lab Results:   I have personally reviewed pertinent reports.      Recent Results (from the past 24 hour(s))   CBC and differential    Collection Time: 04/22/24  5:03 AM   Result Value Ref Range    WBC 7.34 4.31 - 10.16 Thousand/uL    RBC 4.51 3.88 - 5.62 Million/uL    Hemoglobin 13.9 12.0 - 17.0 g/dL    Hematocrit 42.3 36.5 - 49.3 %    MCV 94 82 - 98 fL    MCH 30.8 26.8 - 34.3 pg    MCHC 32.9 31.4 - 37.4 g/dL    RDW 15.3 (H) 11.6 - 15.1 %    MPV 9.3 8.9 - 12.7 fL    Platelets 128 (L) 149 - 390 Thousands/uL    nRBC 0 /100 WBCs    Segmented % 65 43 - 75 %    Immature Grans % 2 0 - 2 %    Lymphocytes % 24 14 - 44 %    Monocytes % 6 4 - 12 %    Eosinophils Relative 2 0 - 6 %    Basophils Relative 1 0 - 1 %    Absolute Neutrophils 4.76 1.85 - 7.62 Thousands/µL    Absolute Immature Grans 0.13 0.00 - 0.20 Thousand/uL    Absolute Lymphocytes 1.78 0.60 - 4.47 Thousands/µL    Absolute Monocytes 0.47 0.17 - 1.22 Thousand/µL    Eosinophils Absolute 0.15 0.00 - 0.61 Thousand/µL    Basophils Absolute 0.05 0.00 - 0.10 Thousands/µL   Basic  metabolic panel    Collection Time: 04/22/24  5:03 AM   Result Value Ref Range    Sodium 139 135 - 147 mmol/L    Potassium 3.7 3.5 - 5.3 mmol/L    Chloride 104 96 - 108 mmol/L    CO2 28 21 - 32 mmol/L    ANION GAP 7 4 - 13 mmol/L    BUN 13 5 - 25 mg/dL    Creatinine 0.80 0.60 - 1.30 mg/dL    Glucose 116 65 - 140 mg/dL    Calcium 9.0 8.4 - 10.2 mg/dL    eGFR 90 ml/min/1.73sq m       Imaging:   EKG, Pathology, and Other Studies: I have personally reviewed pertinent reports.      Code Status: Prior  POLST:    VTE Prophylaxis: Enoxaparin (Lovenox) sequential compression device and foot pump applied  Admission Status: INPATIENT       Abhijeet Babcock MD

## 2024-04-22 NOTE — PROGRESS NOTES
TeleProgress Note - Neurology   Paras Pitts 68 y.o. male MRN: [id number]  Unit/Bed#: -01 Encounter: [id number]    REQUIRED DOCUMENTATION:     1. This service was provided via Telemedicine.  2. Provider located at [place], [place].  3. TeleMed providers Rock Stark MD.  4. Identify all parties in room with patient during tele consult:  5. After connecting through televideo, patient was identified by name and date of birth and assistant checked wristband.  Patient was then informed that this was a Telemedicine visit and that the exam was being conducted confidentially over secure lines. My office door was closed. No one else was in the room.  Patient acknowledged consent and understanding of privacy and security of the Telemedicine visit, and gave us permission to have the assistant stay in the room in order to assist with the history and to conduct the exam.  I informed the patient that I have reviewed their record in Epic and presented the opportunity for them to ask any questions regarding the visit today.  The patient agreed to participate.       Assessment:  68 y.o. right handed male s/p rt hemispheric brain tumor resection for lung cancer mets March of last year at [facility], had gone through chemo radiation and currently on immunotherapy and no prior seizures hx and not on baseline AED who presents yesterday to [facility] with uncontrollable involuntary movements of the left upper and left lower extremities.  Patient was evaluated by neurologist at Dr. Salcedo last week, seen today as a follow-up  Patient has been on Keppra 2000 mg twice daily, Ativan 1 mg Q8.  Per primary team, patient continued to have left lower extremity rhythmic movement occurs every few seconds.  Wife also reported patient being more lethargic.  MRI brain with and without,  Right superior frontal mass resection and chemoradiation with unchanged linear enhancement along the surgical cavity compared to 3/26/2024 favoring  "radiation necrosis. Residual/recurrent disease is felt to be less likely.       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  Discussed with the primary team, please reach out to oncology/radio oncology regarding further oncology management.  May consider to increase Decadron dose if indicated  -Discussed with the primary team, please hold Ativan for now.  Recommend to load the patient with Vimpat 200 mg now.  Continue with 100 mg twice daily.  -As patient has been in status for 2 days and appeared to be more lethargic today, discussed with primary team and the EMU, recommend patient to be transferred to [place] for video EEG monitoring to guide AED escalation.      Subjective:   Patient seen and examined.  Reported patient being more lethargic.  Witnessed rhythmic left lower extremity jerking movement occurs every 2 to 5 seconds.    Meds/Allergies   all current active meds have been reviewed    No Known Allergies      ROS:  Unable to review system due to being restarted  Vitals: Blood pressure 146/98, pulse 94, temperature 98 °F (36.7 °C), temperature source Oral, resp. rate 20, height 5' 9\" (1.753 m), weight 88.5 kg (195 lb 1.7 oz), SpO2 95%.,Body mass index is 28.81 kg/m².    Physical Exam: (Suboptimal with the telemedicine and the patient being more lethargic)  GEN: in no acute distress, well-developed, well nourished  HEENT: normocephalic,  Nose and ears grossly normal in appearance.  CV:  no pedal edema.  Normotensive  PULM: airways patent, non-labored breathing   ABD:  Nondistended  EXT: no   edema or erythema.  No joint swelling  SKIN: no rashes or lesions.     NEURO:        Mental Status: Lethargic but can answer questions and follow commands.  Oriented to person, place, and year.       Speech: Intact Articulation         CN 2-12: grossly intact       Motor: can move all extremities symmetrically      Sensory: Unable to perform " due to telehealth visit       reflexes:  Unable to perform due to telehealth visit       Coordination: Not performed as patient being lethargic            gait/Station: Deferred        Cortical: Unable to perform due to telehealth visit    Lab, Imaging and other studies: CBC:   Results from last 7 days   Lab Units 04/22/24  0503 04/21/24  0426 04/20/24  1345   WBC Thousand/uL 7.34 7.92 11.63*   RBC Million/uL 4.51 4.29 4.83   HEMOGLOBIN g/dL 13.9 13.6 15.3   HEMATOCRIT % 42.3 40.5 46.3   MCV fL 94 94 96   PLATELETS Thousands/uL 128* 124* 166   , BMP/CMP:   Results from last 7 days   Lab Units 04/22/24  0503 04/21/24  0426 04/20/24  1345   SODIUM mmol/L 139 141 141   POTASSIUM mmol/L 3.7 3.4* 4.0   CHLORIDE mmol/L 104 105 102   CO2 mmol/L 28 28 23   BUN mg/dL 13 12 16   CREATININE mg/dL 0.80 0.76 0.95   CALCIUM mg/dL 9.0 9.0 9.7   AST U/L  --   --  25   ALT U/L  --   --  27   ALK PHOS U/L  --   --  48   EGFR ml/min/1.73sq m 90 92 80   , Vitamin B12:     VTE Prophylaxis: Heparin    Counseling / Coordination of Care  Total time spent today 41 minutes. Greater than 50% of total time was spent with the patient and / or family counseling and / or coordination of care. A description of the counseling / coordination of care: Review documentation, interview patient, physical exam, discussed with patient and family

## 2024-04-23 ENCOUNTER — APPOINTMENT (INPATIENT)
Dept: NEUROLOGY | Facility: CLINIC | Age: 69
DRG: 100 | End: 2024-04-23
Payer: COMMERCIAL

## 2024-04-23 ENCOUNTER — TRANSITIONAL CARE MANAGEMENT (OUTPATIENT)
Dept: FAMILY MEDICINE CLINIC | Facility: CLINIC | Age: 69
End: 2024-04-23

## 2024-04-23 PROBLEM — R56.9 SEIZURE-LIKE ACTIVITY (HCC): Status: ACTIVE | Noted: 2024-04-23

## 2024-04-23 LAB
ANION GAP SERPL CALCULATED.3IONS-SCNC: 9 MMOL/L (ref 4–13)
BASOPHILS # BLD AUTO: 0.03 THOUSANDS/ÂΜL (ref 0–0.1)
BASOPHILS NFR BLD AUTO: 0 % (ref 0–1)
BUN SERPL-MCNC: 17 MG/DL (ref 5–25)
CALCIUM SERPL-MCNC: 8.8 MG/DL (ref 8.4–10.2)
CHLORIDE SERPL-SCNC: 102 MMOL/L (ref 96–108)
CO2 SERPL-SCNC: 27 MMOL/L (ref 21–32)
CREAT SERPL-MCNC: 0.78 MG/DL (ref 0.6–1.3)
EOSINOPHIL # BLD AUTO: 0.26 THOUSAND/ÂΜL (ref 0–0.61)
EOSINOPHIL NFR BLD AUTO: 3 % (ref 0–6)
ERYTHROCYTE [DISTWIDTH] IN BLOOD BY AUTOMATED COUNT: 15.5 % (ref 11.6–15.1)
GFR SERPL CREATININE-BSD FRML MDRD: 91 ML/MIN/1.73SQ M
GLUCOSE SERPL-MCNC: 122 MG/DL (ref 65–140)
HCT VFR BLD AUTO: 41.2 % (ref 36.5–49.3)
HGB BLD-MCNC: 13.6 G/DL (ref 12–17)
IMM GRANULOCYTES # BLD AUTO: 0.18 THOUSAND/UL (ref 0–0.2)
IMM GRANULOCYTES NFR BLD AUTO: 2 % (ref 0–2)
LYMPHOCYTES # BLD AUTO: 2 THOUSANDS/ÂΜL (ref 0.6–4.47)
LYMPHOCYTES NFR BLD AUTO: 26 % (ref 14–44)
MCH RBC QN AUTO: 30.7 PG (ref 26.8–34.3)
MCHC RBC AUTO-ENTMCNC: 33 G/DL (ref 31.4–37.4)
MCV RBC AUTO: 93 FL (ref 82–98)
MONOCYTES # BLD AUTO: 0.56 THOUSAND/ÂΜL (ref 0.17–1.22)
MONOCYTES NFR BLD AUTO: 7 % (ref 4–12)
NEUTROPHILS # BLD AUTO: 4.68 THOUSANDS/ÂΜL (ref 1.85–7.62)
NEUTS SEG NFR BLD AUTO: 62 % (ref 43–75)
NRBC BLD AUTO-RTO: 0 /100 WBCS
PLATELET # BLD AUTO: 137 THOUSANDS/UL (ref 149–390)
PMV BLD AUTO: 9.4 FL (ref 8.9–12.7)
POTASSIUM SERPL-SCNC: 3.7 MMOL/L (ref 3.5–5.3)
RBC # BLD AUTO: 4.43 MILLION/UL (ref 3.88–5.62)
SODIUM SERPL-SCNC: 138 MMOL/L (ref 135–147)
WBC # BLD AUTO: 7.71 THOUSAND/UL (ref 4.31–10.16)

## 2024-04-23 PROCEDURE — 97163 PT EVAL HIGH COMPLEX 45 MIN: CPT

## 2024-04-23 PROCEDURE — 80048 BASIC METABOLIC PNL TOTAL CA: CPT | Performed by: GENERAL PRACTICE

## 2024-04-23 PROCEDURE — 99233 SBSQ HOSP IP/OBS HIGH 50: CPT | Performed by: PSYCHIATRY & NEUROLOGY

## 2024-04-23 PROCEDURE — 85025 COMPLETE CBC W/AUTO DIFF WBC: CPT | Performed by: GENERAL PRACTICE

## 2024-04-23 PROCEDURE — C9254 INJECTION, LACOSAMIDE: HCPCS

## 2024-04-23 PROCEDURE — 95715 VEEG EA 12-26HR INTMT MNTR: CPT

## 2024-04-23 PROCEDURE — 97167 OT EVAL HIGH COMPLEX 60 MIN: CPT

## 2024-04-23 PROCEDURE — 99233 SBSQ HOSP IP/OBS HIGH 50: CPT | Performed by: FAMILY MEDICINE

## 2024-04-23 RX ORDER — POTASSIUM CHLORIDE 20 MEQ/1
40 TABLET, EXTENDED RELEASE ORAL ONCE
Status: COMPLETED | OUTPATIENT
Start: 2024-04-23 | End: 2024-04-23

## 2024-04-23 RX ORDER — POLYETHYLENE GLYCOL 3350 17 G/17G
17 POWDER, FOR SOLUTION ORAL DAILY
Status: DISCONTINUED | OUTPATIENT
Start: 2024-04-23 | End: 2024-04-24

## 2024-04-23 RX ORDER — LACOSAMIDE 10 MG/ML
200 INJECTION, SOLUTION INTRAVENOUS EVERY 12 HOURS
Status: DISCONTINUED | OUTPATIENT
Start: 2024-04-24 | End: 2024-04-24

## 2024-04-23 RX ADMIN — PRAMIPEXOLE DIHYDROCHLORIDE 0.25 MG: 0.25 TABLET ORAL at 17:54

## 2024-04-23 RX ADMIN — ENOXAPARIN SODIUM 40 MG: 40 INJECTION SUBCUTANEOUS at 08:50

## 2024-04-23 RX ADMIN — DEXAMETHASONE 4 MG: 4 TABLET ORAL at 08:50

## 2024-04-23 RX ADMIN — LACOSAMIDE 100 MG: 10 INJECTION, SOLUTION INTRAVENOUS at 13:43

## 2024-04-23 RX ADMIN — LEVETIRACETAM 2000 MG: 100 INJECTION, SOLUTION INTRAVENOUS at 08:50

## 2024-04-23 RX ADMIN — PANTOPRAZOLE SODIUM 40 MG: 40 TABLET, DELAYED RELEASE ORAL at 05:28

## 2024-04-23 RX ADMIN — LORAZEPAM 0.5 MG: 2 INJECTION INTRAMUSCULAR; INTRAVENOUS at 17:54

## 2024-04-23 RX ADMIN — PRAMIPEXOLE DIHYDROCHLORIDE 0.25 MG: 0.25 TABLET ORAL at 08:50

## 2024-04-23 RX ADMIN — ATORVASTATIN CALCIUM 40 MG: 40 TABLET, FILM COATED ORAL at 17:54

## 2024-04-23 RX ADMIN — ACETAMINOPHEN 975 MG: 325 TABLET, FILM COATED ORAL at 22:25

## 2024-04-23 RX ADMIN — LORAZEPAM 0.5 MG: 2 INJECTION INTRAMUSCULAR; INTRAVENOUS at 22:24

## 2024-04-23 RX ADMIN — LOSARTAN POTASSIUM 50 MG: 50 TABLET, FILM COATED ORAL at 08:50

## 2024-04-23 RX ADMIN — POTASSIUM CHLORIDE 40 MEQ: 1500 TABLET, EXTENDED RELEASE ORAL at 08:50

## 2024-04-23 RX ADMIN — AMLODIPINE BESYLATE 10 MG: 10 TABLET ORAL at 08:50

## 2024-04-23 RX ADMIN — LORAZEPAM 0.5 MG: 2 INJECTION INTRAMUSCULAR; INTRAVENOUS at 10:25

## 2024-04-23 RX ADMIN — LACOSAMIDE 100 MG: 10 INJECTION, SOLUTION INTRAVENOUS at 00:04

## 2024-04-23 RX ADMIN — POLYETHYLENE GLYCOL 3350 17 G: 17 POWDER, FOR SOLUTION ORAL at 10:27

## 2024-04-23 RX ADMIN — PRAMIPEXOLE DIHYDROCHLORIDE 0.25 MG: 0.25 TABLET ORAL at 22:24

## 2024-04-23 RX ADMIN — LEVETIRACETAM 2000 MG: 100 INJECTION, SOLUTION INTRAVENOUS at 22:23

## 2024-04-23 RX ADMIN — LORAZEPAM 0.5 MG: 2 INJECTION INTRAMUSCULAR; INTRAVENOUS at 05:00

## 2024-04-23 NOTE — UTILIZATION REVIEW
NOTIFICATION OF INPATIENT ADMISSION   AUTHORIZATION REQUEST   SERVICING FACILITY:   [facility]  Address: [address] [place] [address]  Tax ID: [id number]  NPI: [id number] ATTENDING PROVIDER:  Attending Name and NPI#: Elaine Yadav Do [[id number]]  Address: [address] [place] [address]  Phone: [phone]   ADMISSION INFORMATION:  Place of Service: Inpatient [facility]  Place of Service Code: 21  Inpatient Admission Date/Time: 4/22/24  6:46 PM  Discharge Date/Time: No discharge date for patient encounter.  Admitting Diagnosis Code/Description:  Tremors of nervous system [R25.1]     UTILIZATION REVIEW CONTACT:  Gerardo Vargas Utilization   Network Utilization Review Department  Phone: [phone]  Fax: [phone]  Email: [email or web address]  Contact for approvals/pending authorizations, clinical reviews, and discharge.     PHYSICIAN ADVISORY SERVICES:  Medical Necessity Denial & Pujz-bj-Wsyb Review  Phone: [phone]  Fax: [phone]  Email: [email or web address]     DISCHARGE SUPPORT TEAM:  For Patients Discharge Needs & Updates  Phone: [phone] opt. 2 Fax: [phone]  Email: [email or web address]

## 2024-04-23 NOTE — PROGRESS NOTES
Progress Notes - Family Medicine Residency, Elif Crain Hong 1955, 68 y.o. male.  MRN: [id number]  Unit/Bed#: [facility] 721-01 Encounter: [id number]  Primary Care Provider: Maikel Brower DO      Admission Date: 4/22/2024 1846  Length of Stay: 1 days  Code Status:  Prior  Disposition:   Consult:   IP CONSULT TO NEUROLOGY  IP CONSULT TO CASE MANAGEMENT         Assessment/Plan:      Plans discussed with [facility] team and finalization is pending attending physician attestation. Please, call [phone] for any clarification.     * Tremors of nervous system  Assessment & Plan  Patient was originally admitted to the [facility] on 4/20/2024 after acute onset tremors of the right upper and lower extremity.  He does have a history of stroke with left-sided weakness and his new tremors were thought to be related to his previous neurologic injury.  Initial stroke workup was negative and neurology was consulted.  Imaging was obtained including MRI that showed no additional evidence of stroke/TIA. Neurology then began an evaluation for seizure like activity in this patient because of his history of brain metastasis and surgical resection of brain mets.  He was loaded with Keppra and was admitted for close monitoring.  Despite his initial AED dosing the patient's tremors continued.  At this point neurology increased his AED medication regimen with the addition of Vimpat to his twice daily Keppra. He was loaded with 200 mg Vimpat and will continue 100 mg twice daily. Neurology also recommended transfer to [facility] for video EEG monitoring and further titration of antiepileptic drugs.    Assessment: New onset partial seizures, concern for status     Plan:  - Neurology consult on arrival  - Video EEG ordered on arrival  - Continue Keppra 2 g twice daily  - Continue Vimpat 100 mg twice daily  - Neurochecks every 4  - Close hemodynamic monitoring      Generalized weakness  Assessment & Plan  Patient presenting with  generalized weakness in addition to his left-sided tremors. LUE paralysis at b/l LLE near paralysis.    Assessment: weakness likely multifactorial in the setting of new onset seizures (postictal?) and metastatic lung cancer receiving chemotherapy and radiation    Plan:  - PT / OT consult  - Case management consult    History of CVA (cerebrovascular accident)  Assessment & Plan  Patient has a history of stroke and has left-sided weakness as a long-term sequelae.    Assessment: History of CVA with residual left-sided weakness    Plan:  - Continue statin    Carcinoma of right lung (HCC)  Assessment & Plan  Patient was diagnosed with adenocarcinoma of the right lung in 2023; stage Stage JAY (cT3, cN1, cM1b)   He is s/p robotic converted to right Thoracotomy and right upper lobectomy on 9/1/2023.  He has also undergone bronchoscopy with EBUS at [facility] on 4/16/2024.  Initial pathology showing metastatic lung cell carcinoma in the most recent biopsies.  He follows with [facility] hematology oncology for ongoing cancer management and chemotherapy.    Assessment: Right lung adenocarcinoma with metastasis to brain and mediastinal lymph nodes    Plan:  - Neurology to weigh in on inpatient heme-onc consult  - If no inpatient consult needed, continue outpatient treatment      Metastatic adenocarcinoma (HCC)  Assessment & Plan  History of metastatic lung adenocarcinoma w/ mets to brain. Most recent staging: Stage JAY (cT3, cN1, cM1b)  He has undergone multiple rounds of chemotherapy and radiation and has undergone surgical resection for the metastatic brain lesion.  A PET scan on 3/25/2024 showed uptake in mediastinal lymph nodes compatible with metastasis.  Repeat MRI brain during current hospitalization showed stable postsurgical changes and lack of new lesions.  He has been taking Decadron at home for vasogenic edema of the brain.    Patient now presenting with new seizure activity.  He has been evaluated at the [facility] by our neurology colleagues.  He has been started on antiepileptic drugs but has continued to have breakthrough seizures.  He is being transferred to [facility] for video EEG monitoring and additional AED titration.    Assessment: Metastatic adenocarcinoma of the lung with metastasis to the brain s/p surgical resection and SRS on daily Decadron    Plan:  - Continue Decadron at 4 mg/day  - Neurology will weigh in on oncology consultation while hospitalized. On initial evaluation no immediate need for oncology recommendations      Hypercholesterolemia  Assessment & Plan  Continue home statin    Essential hypertension  Assessment & Plan  Patient has known history of hypertension, no issues with blood pressure prior to transfer.  Home regimen: Amlodipine, losartan    Assessment: Hypertension, well-controlled    Plan:  - Continue amlodipine and losartan          Diet: Diet Cardiovascular; Cardiac    VTE Pharm PPX: Enoxaparin (Lovenox)  [facility]      Subjective:   68 y.o. male admitted 4/22/2024 for tremors of the nervous system    Today 04/23/24, HD# 1    Patient seen and examined at bedside this a.m.  Patient is comfortable appearing and has no questions or concerns.  Patient in agreement with plan of completing video EEG and continuing medication therapy.  All questions and concerns addressed.    Objective:     Vitals:    04/22/24 2028 04/23/24 0744 04/23/24 1118   BP: 142/94 144/93 141/91   Pulse: 83 81 81   Resp: 16 16 18   Temp: 97.5 °F (36.4 °C) (!) 97.4 °F (36.3 °C) 97.9 °F (36.6 °C)   SpO2: 96% 96% 96%     Temp:  [97.4 °F (36.3 °C)-97.9 °F (36.6 °C)] 97.9 °F (36.6 °C)  HR:  [81-83] 81  Resp:  [16-18] 18  BP: (141-144)/(91-94) 141/91  Weight (last 2 days)       None            Intake/Output Summary (Last 24 hours) at 4/23/2024 1212  Last data filed at 4/23/2024 0906  Gross per 24 hour   Intake 531 ml   Output 300 ml   Net 231 ml     Invasive Devices       Central  Venous Catheter Line  Duration             Port A Cath 04/27/23 Right Subclavian 362 days              Peripheral Intravenous Line  Duration             Peripheral IV 04/21/24 Distal;Right;Upper;Ventral (anterior) Arm 1 day                      Physical Exam:     Physical Exam  Constitutional:       Appearance: Normal appearance.   HENT:      Mouth/Throat:      Mouth: Mucous membranes are moist.   Cardiovascular:      Rate and Rhythm: Normal rate and regular rhythm.      Pulses: Normal pulses.      Heart sounds: Normal heart sounds.   Pulmonary:      Effort: Pulmonary effort is normal.      Breath sounds: Normal breath sounds. No wheezing, rhonchi or rales.   Abdominal:      General: Abdomen is protuberant. Bowel sounds are normal. There is distension.      Hernia: A hernia is present. Hernia is present in the umbilical area.   Musculoskeletal:      Right lower leg: No edema.      Left lower leg: No edema.      Comments: RLE and RUE 5/5 strength  LUE 3/5, LLE 1/5 strenght   Neurological:      Mental Status: He is alert.      Cranial Nerves: No cranial nerve deficit.      Comments: Involuntary twitching of left lower extremity witnessed every 2-5 seconds           Labs:     CBC:  Results from last 7 days   Lab Units 04/23/24  0528 04/22/24  0503 04/21/24  0426 04/20/24  1345   WBC Thousand/uL 7.71 7.34 7.92 11.63*   HEMOGLOBIN g/dL 13.6 13.9 13.6 15.3   HEMATOCRIT % 41.2 42.3 40.5 46.3   PLATELETS Thousands/uL 137* 128* 124* 166   TOTAL NEUT ABS Thousands/µL 4.68 4.76  --   --        CMP:  Results from last 7 days   Lab Units 04/23/24 0528 04/22/24  0503 04/21/24  0426 04/20/24  1345   POTASSIUM mmol/L 3.7 3.7 3.4* 4.0   CHLORIDE mmol/L 102 104 105 102   CO2 mmol/L 27 28 28 23   BUN mg/dL 17 13 12 16   CREATININE mg/dL 0.78 0.80 0.76 0.95   CALCIUM mg/dL 8.8 9.0 9.0 9.7   AST U/L  --   --   --  25   ALT U/L  --   --   --  27   ALK PHOS U/L  --   --   --  48   EGFR ml/min/1.73sq m 91 90 92 80   MAGNESIUM mg/dL  --    --   --  1.9   PHOSPHORUS mg/dL  --   --   --  3.5       Sepsis:        Micro:         Imaging:     No results found.      Medications:     Current Facility-Administered Medications   Medication Dose Route Frequency    acetaminophen (TYLENOL) tablet 975 mg  975 mg Oral Q8H PRN    aluminum-magnesium hydroxide-simethicone (MAALOX) oral suspension 30 mL  30 mL Oral Q6H PRN    amLODIPine (NORVASC) tablet 10 mg  10 mg Oral Daily    atorvastatin (LIPITOR) tablet 40 mg  40 mg Oral After Dinner    dexamethasone (DECADRON) tablet 4 mg  4 mg Oral Daily    enoxaparin (LOVENOX) subcutaneous injection 40 mg  40 mg Subcutaneous Daily    lacosamide (VIMPAT) injection 100 mg  100 mg Intravenous Q12H    levETIRAcetam (KEPPRA) injection 2,000 mg  2,000 mg Intravenous Q12H LEN    LORazepam (ATIVAN) injection 0.5 mg  0.5 mg Intravenous Q6H    losartan (COZAAR) tablet 50 mg  50 mg Oral Daily    ondansetron (ZOFRAN) injection 4 mg  4 mg Intravenous Q6H PRN    pantoprazole (PROTONIX) EC tablet 40 mg  40 mg Oral Early Morning    polyethylene glycol (MIRALAX) packet 17 g  17 g Oral Daily    pramipexole (MIRAPEX) tablet 0.25 mg  0.25 mg Oral TID                   Shea Alcantar MD  Family Medicine, PGY-1  12:12 PM 4/23/2024

## 2024-04-23 NOTE — PHYSICAL THERAPY NOTE
Physical Therapy Evaluation    Patient Name: Paras Pitts    Today's Date: 4/23/2024     Problem List  Principal Problem:    Tremors of nervous system  Active Problems:    Essential hypertension    Hypercholesterolemia    Metastatic adenocarcinoma (HCC)    Carcinoma of right lung (HCC)    History of CVA (cerebrovascular accident)    Generalized weakness    Seizure-like activity (HCC)       Past Medical History  Past Medical History:   Diagnosis Date    Brain compression (HCC) 03/20/2023    Brain mass 03/20/2023    Cancer (HCC) 2001    Receint lung and brain lesions and prostate cancer in 2001    Cerebral edema (HCC) 03/20/2023    Hypertension     Prostate cancer (HCC) 2001    Rectal bleeding     Stroke (HCC)     2011          Past Surgical History  Past Surgical History:   Procedure Laterality Date    COLONOSCOPY      CRANIOTOMY Right 3/23/2023    Procedure: Right frontal CRANIOTOMY IMAGE-GUIDED FOR TUMOR;  Surgeon: Clark Carrillo MD;  [facility];  Service: Neurosurgery    ENDOBRONCHIAL ULTRASOUND (EBUS) N/A 4/16/2024    Procedure: ENDOBRONCHIAL ULTRASOUND (EBUS);  Surgeon: Kalia Sparrow MD;  [facility];  Service: Thoracic    IR PORT PLACEMENT  4/27/2023    WI BRNCHSC INCL FLUOR GDNCE DX W/CELL WASHG SPX N/A 9/1/2023    Procedure: BRONCHOSCOPY FLEXIBLE;  Surgeon: Kalia Sparrow MD;  [facility];  Service: Thoracic    WI BRNCHSC INCL FLUOR GDNCE DX W/CELL WASHG SPX N/A 4/16/2024    Procedure: BRONCHOSCOPY FLEXIBLE;  Surgeon: Kalia Sparrow MD;  [facility];  Service: Thoracic    WI CYSTOURETHROSCOPY N/A 3/23/2023    Procedure: EUA, DEL CASTILLO INSERTION;  Surgeon: Ajay Piedra MD;  [facility];  Service: Urology    WI THORACOSCOPY W/LOBECTOMY SINGLE LOBE Right 9/1/2023    Procedure: robotic assisted converted to open right upper lobectomy, lymph node dissection;  Surgeon: Kalia Sparrow MD;  [facility];  Service: Thoracic    PROSTATECTOMY  2001    THORACOTOMY Right 9/1/2023    Procedure: right thoracotomy with Cryo-Ablation;  Surgeon: Kalia Sparrow MD;  [facility];  Service: Thoracic    TONSILLECTOMY           04/23/24 0840   PT Last Visit   PT Visit Date 04/23/24   Note Type   Note type Evaluation   Pain Assessment   Pain Assessment Tool 0-10   Pain Score No Pain   Restrictions/Precautions   Weight Bearing Precautions Per Order No   Other Precautions Cognitive;Bed Alarm;Chair Alarm;Multiple lines;Telemetry;Fall Risk  (+EEG, L hemiparesis)   Home Living   Type of Home House   Home Layout Multi-level;Stairs to enter with rails;1/2 bath on main level  (4 ZACH, full flight to 2nd floor.)   Bathroom Shower/Tub Tub/shower unit   Bathroom Toilet Standard   Bathroom Equipment Grab bars in shower;Shower chair   Home Equipment Cane   Prior Function   Level of [place] Independent with ADLs;Independent with functional mobility;Independent with IADLS   Lives With Spouse  (works in [place])   Receives Help From Family   IADLs Independent with driving;Independent with meal prep;Independent with medication management   Falls in the last 6 months 1 to 4  (1)   Vocational   (pt owns art studio)   General   Additional Pertinent History involuntary L leg movement during sessio. +EEG monitoring at time.   Family/Caregiver Present No   Cognition   Overall Cognitive Status Impaired   Arousal/Participation Cooperative   Attention Attends with cues to redirect   Orientation Level Oriented X4   Following Commands Follows one step commands without difficulty   Comments very pleasant   Subjective   Subjective agreeable   RUE Assessment   RUE Assessment WFL   LUE Assessment   LUE Assessment   (please see OT assessment)   RLE Assessment   RLE Assessment WFL   Strength LLE   LLE Overall Strength 1/5   Light Touch   RLE Light Touch Grossly intact   LLE Light Touch Grossly intact  (per pt)   Bed Mobility   Supine to Sit 3   Moderate assistance   Additional items Assist x 2;HOB elevated;Increased time required;Verbal cues;LE management   Sit to Supine 2  Maximal assistance   Additional items Assist x 2;Increased time required;Verbal cues;LE management   Transfers   Sit to Stand 3  Moderate assistance   Additional items Assist x 2;Increased time required;Verbal cues   Stand to Sit 3  Moderate assistance   Additional items Assist x 2;Increased time required;Verbal cues   Additional Comments LUE supported, R HHA, b/l knee block   Ambulation/Elevation   Gait pattern Improper Weight shift;L Knee Ramakrishna;L Foot drag;L Hemiparesis;Decreased foot clearance;Decreased L stance;Short stride;Excessively slow;Step to   Gait Assistance 2  Maximal assist   Additional items Assist x 2;Verbal cues   Assistive Device   (HHA (LUE supported))   Distance 1 side step   Balance   Static Sitting Poor +   Dynamic Sitting Poor   Static Standing Poor -   Dynamic Standing Poor -   Ambulatory Poor -   Endurance Deficit   Endurance Deficit Yes   Endurance Deficit Description fatigue, L peter   Activity Tolerance   Activity Tolerance Patient limited by fatigue   Medical Staff Made Aware OT ALEYDA   Nurse Made Aware yes-cleared   Assessment   Prognosis Fair   Problem List Decreased strength;Decreased mobility;Impaired balance;Decreased endurance;Decreased cognition;Impaired judgement;Decreased safety awareness   Assessment Pt is a 68 y.o. male admitted [facility] on 4/22/2024 with primary medical dx of tremors of nervous system which are occurring on L side of body. Pt has the following comorbidities which affect their treatment: active problems listed above,  has a past medical history of Brain compression (HCC), Brain mass, Cancer (HCC), Cerebral edema (HCC), Hypertension, Prostate cancer (HCC), Rectal bleeding, and Stroke (HCC).  , h/o CVA with L sided weakness, as well as personal factors including stairs to access home and being +alone while wife at work. Pt has a high  complexity clinical presentation due to Ongoing medical management for primary dx, Increased reliance on more restrictive AD compared to baseline, Decreased activity tolerance compared to baseline, Fall risk, Increased assistance needed from caregiver at current time, Ongoing telemetry monitoring, Cog status, Trending lab values, Diagnostic imaging pending, Continuous pulse oximetry monitoring  , and PMH. PT was consulted to evaluate pt's functional mobility and discharge needs. Upon evaluation, patient required modAx2 for bed mobility, modAx2 for STS transfers, maxAx2 for ambulation. Body system impairments include +LLE tremors,  impaired cognition, vision, balance, L sided strength, endurance. Pt's functional activity impairments include: activity tolerance and mobility. Participation restrictions include inability to safely return home, community, or work. At conclusion of eval, pt remained supine in bed with bed alarm, phone, call bell, and all other personal needs within reach. Pt would benefit from skilled PT to address their functional mobility limitations. The patient's AM-PAC Basic Mobility Inpatient Short Form Raw Score is 7. A Raw score of less than or equal to 16 suggests the patient may benefit from discharge to post-acute rehabilitation services. Please also refer to the recommendation of the Physical Therapist for safe discharge planning.   Barriers to Discharge Decreased caregiver support;Inaccessible home environment   Goals   Patient Goals to improve   STG Expiration Date 05/07/24   Short Term Goal #1 In 14 days, pt will: 1) perform bed mobility with S to promote functional independence and decrease caregiver burden. 2) perform transfers to<>from all surfaces with S to promote functional independence and safety 3) ambulate 50' with S and least restrictive device to promote safe return to home. 4) negotiate 4 stairs with S to promote safe return to home. 5) improve balance grades by 1/2 to promote  safety with functional mobility. 6) improve strength grades by 1/2 to promote safety with functional mobility.   PT Treatment Day 0   Plan   Treatment/Interventions Functional transfer training;LE strengthening/ROM;Therapeutic exercise;Endurance training;Cognitive reorientation;Patient/family training;Equipment eval/education;Bed mobility;Gait training;Spoke to nursing;Spoke to case management;OT   PT Frequency 3-5x/wk   Discharge Recommendation   Rehab Resource Intensity Level, PT (S)  I (Maximum Resource Intensity)   AM-PAC Basic Mobility Inpatient   Turning in Flat Bed Without Bedrails 2   Lying on Back to Sitting on Edge of Flat Bed Without Bedrails 1   Moving Bed to Chair 1   Standing Up From Chair Using Arms 1   Walk in Room 1   Climb 3-5 Stairs With Railing 1   Basic Mobility Inpatient Raw Score 7   Turning Head Towards Sound 4   Follow Simple Instructions 3   Low Function Basic Mobility Raw Score  14   Low Function Basic Mobility Standardized Score  22.01   [facility] Highest Level Of Mobility   JH-HLM Goal 2: Bed activities/Dependent transfer   JH-HLM Achieved 3: Sit at edge of bed   Modified Coles Scale   Modified Coles Scale 4   Barthel Index   Feeding 5   Bathing 0   Grooming Score 0   Dressing Score 0   Bladder Score 5   Bowels Score 5   Toilet Use Score 5   Transfers (Bed/Chair) Score 5   Mobility (Level Surface) Score 0   Stairs Score 0   Barthel Index Score 25   Arben Cox, PT, DPT

## 2024-04-23 NOTE — PLAN OF CARE
Problem: Prexisting or High Potential for Compromised Skin Integrity  Goal: Skin integrity is maintained or improved  Description: INTERVENTIONS:  - Identify patients at risk for skin breakdown  - Assess and monitor skin integrity  - Assess and monitor nutrition and hydration status  - Monitor labs   - Assess for incontinence   - Turn and reposition patient  - Assist with mobility/ambulation  - Relieve pressure over bony prominences  - Avoid friction and shearing  - Provide appropriate hygiene as needed including keeping skin clean and dry  - Evaluate need for skin moisturizer/barrier cream  - Collaborate with interdisciplinary team   - Patient/family teaching  - Consider wound care consult   Outcome: Progressing     Problem: NEUROSENSORY - ADULT  Goal: Achieves stable or improved neurological status  Description: INTERVENTIONS  - Monitor and report changes in neurological status  - Monitor vital signs such as temperature, blood pressure, glucose, and any other labs ordered   - Initiate measures to prevent increased intracranial pressure  - Monitor for seizure activity and implement precautions if appropriate      Outcome: Progressing  Goal: Remains free of injury related to seizures activity  Description: INTERVENTIONS  - Maintain airway, patient safety  and administer oxygen as ordered  - Monitor patient for seizure activity, document and report duration and description of seizure to physician/advanced practitioner  - If seizure occurs,  ensure patient safety during seizure  - Reorient patient post seizure  - Seizure pads on all 4 side rails  - Instruct patient/family to notify RN of any seizure activity including if an aura is experienced  - Instruct patient/family to call for assistance with activity based on nursing assessment  - Administer anti-seizure medications if ordered    Outcome: Progressing     Problem: MUSCULOSKELETAL - ADULT  Goal: Maintain or return mobility to safest level of  function  Description: INTERVENTIONS:  - Assess patient's ability to carry out ADLs; assess patient's baseline for ADL function and identify physical deficits which impact ability to perform ADLs (bathing, care of mouth/teeth, toileting, grooming, dressing, etc.)  - Assess/evaluate cause of self-care deficits   - Assess range of motion  - Assess patient's mobility  - Assess patient's need for assistive devices and provide as appropriate  - Encourage maximum independence but intervene and supervise when necessary  - Involve family in performance of ADLs  - Assess for home care needs following discharge   - Consider OT consult to assist with ADL evaluation and planning for discharge  - Provide patient education as appropriate  Outcome: Progressing     Problem: PAIN - ADULT  Goal: Verbalizes/displays adequate comfort level or baseline comfort level  Description: Interventions:  - Encourage patient to monitor pain and request assistance  - Assess pain using appropriate pain scale  - Administer analgesics based on type and severity of pain and evaluate response  - Implement non-pharmacological measures as appropriate and evaluate response  - Consider cultural and social influences on pain and pain management  - Notify physician/advanced practitioner if interventions unsuccessful or patient reports new pain  Outcome: Progressing     Problem: DISCHARGE PLANNING  Goal: Discharge to home or other facility with appropriate resources  Description: INTERVENTIONS:  - Identify barriers to discharge w/patient and caregiver  - Arrange for needed discharge resources and transportation as appropriate  - Identify discharge learning needs (meds, wound care, etc.)  - Arrange for interpretive services to assist at discharge as needed  - Refer to Case Management Department for coordinating discharge planning if the patient needs post-hospital services based on physician/advanced practitioner order or complex needs related to functional  status, cognitive ability, or social support system  Outcome: Progressing     Problem: Knowledge Deficit  Goal: Patient/family/caregiver demonstrates understanding of disease process, treatment plan, medications, and discharge instructions  Description: Complete learning assessment and assess knowledge base.  Interventions:  - Provide teaching at level of understanding  - Provide teaching via preferred learning methods  Outcome: Progressing

## 2024-04-23 NOTE — QUICK NOTE
TigerTexted by the family medicine resident that the family of the patient stated that the patient was having new double vision on his way to [facility]. Upon arrival to the hospital, it was resolved.    HPI:   Patient is a 68 year old male who presented to the ED for continuous movements of his LUE and LLE. Patient has a PMH of HTN, hypercholesterolemia, metastatic adenocarcinoma, CVA, tremors, and weakness. Upon evaluation at bedside, patient stated that the double vision is very far away. He stated that he saw double cars en route to [facility]. Then, he stated that he was seeing 4 light bulbs in the parking lot through the window but there was a reflection which made the 2 light bulbs seem to be 4. At this time, the LUE movements have resolved but the LLE continues to have rhythmic movements.     Exam:  Mental status: AAOx4  Cranial Nerves: 2-12 intact, no nystagmus, no diplopia  Motor Strength: RUE/RLE 5/5, LUE 3/5, LLE patient unable to lift antigravity and no resistance  Sensory: Intact b/l  Reflexes: 1+ throughout  Coordination: Intact on the R side, unable to test on the L side    Impression: Patient's movements seem to be consistent with the EPC in the setting of metastatic adenocarcinoma.    Plan:  Discussed with overnight attending, Dr. Lipscomb  Initiate vEEG  Continue Keppra 2 g twice daily  Continue Vimpat 100 mg twice daily  Initiate Ativan 0.5 mg every 6 scheduled  Recommend increasing the dexamethasone from 1 mg to 4 mg BID as per radiation oncology notes  Rest of care as per primary team  Neurology team will follow in the daytime

## 2024-04-23 NOTE — ASSESSMENT & PLAN NOTE
Patient was diagnosed with adenocarcinoma of the right lung in 2023; stage Stage JAY (cT3, cN1, cM1b)   He is s/p robotic converted to right Thoracotomy and right upper lobectomy on 9/1/2023.  He has also undergone bronchoscopy with EBUS at hospitals on 4/16/2024.  Initial pathology showing metastatic lung cell carcinoma in the most recent biopsies.  He follows with [facility] hematology oncology for ongoing cancer management and chemotherapy.    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

## 2024-04-23 NOTE — UTILIZATION REVIEW
Initial Clinical Review    Admission: Date/Time/Statement:   Admission Orders (From admission, onward)       Ordered        04/22/24 1901  Inpatient Admission  Once                          Orders Placed This Encounter   Procedures    Inpatient Admission     Standing Status:   Standing     Number of Occurrences:   1     Order Specific Question:   Level of Care     Answer:   Level 2 Stepdown / HOT [14]     Order Specific Question:   Estimated length of stay     Answer:   More than 2 Midnights     Order Specific Question:   Certification     Answer:   I certify that inpatient services are medically necessary for this patient for a duration of greater than two midnights. See H&P and MD Progress Notes for additional information about the patient's course of treatment.     4/20/2024 - 4/22/2024 (2 days)  [facility]  Tremors of nervous system, right lung cancer, metastatic adenocarcinoma, essential HTN, obesity.  PMHX: CVA with L side deficits.   Onset of symptoms in the afternoon on 04/20 w/ full body tremors; continued LLE tremors & LUE weakness; cannot bear weight on LLE   Etiology unclear   Differential includes partial seizure disorder vs. benign essential tremors vs. 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   Discharge to [facility] for higher level of care including video EEG and radiation oncology consult     Initial Presentation: 68 y.o. male received from [facility] for inpatient step down admission to evaluate LUE weakness and new tremors in LUE and LLE which may be from partial seizure disorder vs radiation necrosis.  Patient has history of stroke with known left sided deficits.  He also  has a history of brain mets with surgical resection of the met and RU lobectomy. 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  Previously walking and  driving his car independently and now unable to bear wt on his LLE.   Imaging at [place] showed no additional evidence of stroke of TIA. Plan includes: consider   radiation oncology consult, increase po decadron 1 mg to 4 mg  for metastatic adenocarcinoma, video EEG, iv keppra, iv vimpat, neuro checks q4 hr, PT/OT evaluations.OT cognitive test.     Date: 4- 23-24    Day 2: inpatient step down  Neuro exam shows double vision at a distance, LUE 3/5 strength, LLE unable to left antigravity and no resistance.   LLE continues to have rhythmic movement.  LUE movements have resolved. Video EEG in progress.  No events captured. Continue iv vimpat 100 mg q12, iv keppra 2000mg q12, continue dexamethasone po, start iv ativan scheduled q6 hr. Trend CBC and BMP.      Date: 4-24-23   Day 3: Has surpassed a 2nd midnight with active treatments and services.  Per neurology.  Patient with reduced amplitude and frequency here with no generalization on Vimpat 100 mg bid and keppra 2 g bid. Ativan 0.5mg   scheduled q6 hr.  Vimpat increased to 200 mg bid Continue Video EEG today.  Plan today to reduce scheduled  ativan to 0.5 mg q8 hr then to q 12 hr if able depending on course in hopes of reducing sedation.  If patient remains stable today will discontinue video EEG.  Medical exam: LUE & LLE =1 strength. Able to  finger of L hand.  Abdomen distended . No BM since 4-20.  Continue neuro checks q4 hr.  . Continue po decadron 4 mg.      Arrival    04/22/24 2028 04/22/24 2028 04/22/24 2028 04/22/24 2028 04/22/24 2028   97.5 °F (36.4 °C) 83 16 142/94 96 %      No Pain          04/20/24 88.5 kg (195 lb 1.7 oz)     Additional Vital Signs:   Date/Time Temp Pulse Resp BP MAP (mmHg) SpO2   04/23/24 07:44:38 97.4 °F (36.3 °C)   Abnormal  81 16 144/93 110 96 %   04/22/24 20:28:34 97.5 °F (36.4 °C) 83 16 142/94 110 96 %     Date and Time R Pupil Size (mm) L Pupil Size (mm) R Pupil Reaction L Pupil Reaction   04/23/24 0400 3 3 Brisk Brisk    04/23/24 0000 3 3 Brisk Brisk   04/22/24 2000 3 3 Brisk Brisk   04/22/24 1630 2 2 Brisk Brisk   04/22/24 1130 2 2 Brisk Brisk   04/22/24 0915 2 2 Brisk Brisk   04/21/24 2300 2 2 Brisk Brisk   04/21/24 1500 2 2 Brisk Brisk   04/21/24 1100 2 2 Brisk Brisk   04/21/24 0700 2 2 Brisk Brisk   04/21/24 0215 2 2 Brisk Brisk   04/20/24 2215 2 2 Brisk Brisk   04/20/24 1815 2 2 Brisk Brisk   04/20/24 1715 2 2 Brisk Brisk   04/20/24 1615 2 2 Brisk Brisk     Date and Time Eye Opening Best Verbal Response Best Motor Response [place] Coma Scale Score   04/23/24 0400 4 5 6 15   04/23/24 0000 4 5 6 15   04/22/24 2000 4 5 6 15   04/22/24 1630 4 5 6 15   04/22/24 1130 4 5 6 15   04/22/24 0915 4 5 6 15         Pertinent Labs/Diagnostic Test Results:     [facility]   MRI brain (04/21):  Right superior frontal mass resection and chemoradiation with unchanged linear enhancement along the surgical cavity compared to 3/26/2024 favoring radiation necrosis. Residual/recurrent disease is felt to be less likely. Stable surrounding hyperintense   T2/FLAIR signal likely related to a combination of posttreatment changes and resolving vasogenic edema. There is no new suspicious intra-axial lesion.  Mild to moderate chronic microangiopathic ischemic changes          Results from last 7 days   Lab Units 04/23/24  0528 04/22/24  0503 04/21/24  0426 04/20/24  1345   WBC Thousand/uL 7.71 7.34 7.92 11.63*   HEMOGLOBIN g/dL 13.6 13.9 13.6 15.3   HEMATOCRIT % 41.2 42.3 40.5 46.3   PLATELETS Thousands/uL 137* 128* 124* 166   TOTAL NEUT ABS Thousands/µL 4.68 4.76  --   --          Results from last 7 days   Lab Units 04/23/24  0528 04/22/24  0503 04/21/24  0426 04/20/24  1345   SODIUM mmol/L 138 139 141 141   POTASSIUM mmol/L 3.7 3.7 3.4* 4.0   CHLORIDE mmol/L 102 104 105 102   CO2 mmol/L 27 28 28 23   ANION GAP mmol/L 9 7 8 16*   BUN mg/dL 17 13 12 16   CREATININE mg/dL 0.78 0.80 0.76 0.95   EGFR ml/min/1.73sq m 91 90 92 80   CALCIUM mg/dL 8.8 9.0 9.0  9.7   MAGNESIUM mg/dL  --   --   --  1.9   PHOSPHORUS mg/dL  --   --   --  3.5     Results from last 7 days   Lab Units 04/20/24  1345   AST U/L 25   ALT U/L 27   ALK PHOS U/L 48   TOTAL PROTEIN g/dL 7.3   ALBUMIN g/dL 4.9   TOTAL BILIRUBIN mg/dL 1.09*         Results from last 7 days   Lab Units 04/23/24  0528 04/22/24  0503 04/21/24  0426 04/20/24  1345   GLUCOSE RANDOM mg/dL 122 116 108 142*     Results from last 7 days   Lab Units 04/20/24  1345   PROTIME seconds 12.6   INR  0.92   PTT seconds 22*       ED Treatment:   Medication Administration - No Administrations Displayed (No Start Event Found)       None          Past Medical History:   Diagnosis Date    Brain compression (HCC) 03/20/2023    Brain mass 03/20/2023    Cancer (HCC) 2001    Receint lung and brain lesions and prostate cancer in 2001    Cerebral edema (HCC) 03/20/2023    Hypertension     Prostate cancer (HCC) 2001    Rectal bleeding     Stroke (HCC)     2011       Present on Admission:   Tremors of nervous system   Carcinoma of right lung (HCC)   Generalized weakness   Metastatic adenocarcinoma (HCC)   Hypercholesterolemia   Essential hypertension      Admitting Diagnosis: Tremors of nervous system [R25.1]    Age/Sex: 68 y.o. male      Scheduled Medications:    amLODIPine, 10 mg, Oral, Daily  atorvastatin, 40 mg, Oral, After Dinner  dexamethasone, 4 mg, Oral, Daily  enoxaparin, 40 mg, Subcutaneous, Daily  lacosamide, 100 mg, Intravenous, Q12H  levETIRAcetam, 2,000 mg, Intravenous, Q12H LEN  LORazepam, 0.5 mg, Intravenous, Q6H  losartan, 50 mg, Oral, Daily  pantoprazole, 40 mg, Oral, Early Morning  polyethylene glycol, 17 g, Oral, Daily  pramipexole, 0.25 mg, Oral, TID      Continuous IV Infusions:     PRN Meds:  acetaminophen, 975 mg, Oral, Q8H PRN  aluminum-magnesium hydroxide-simethicone, 30 mL, Oral, Q6H PRN  ondansetron, 4 mg, Intravenous, Q6H PRN        IP CONSULT TO NEUROLOGY  IP CONSULT TO CASE MANAGEMENT    Network Utilization Review  Department  ATTENTION: Please call with any questions or concerns to [phone] and carefully listen to the prompts so that you are directed to the right person. All voicemails are confidential.   For Discharge needs, contact Care Management DC Support Team at [phone] opt. [phone]  Send all requests for admission clinical reviews, approved or denied determinations and any other requests to dedicated fax number below belonging to the campus where the patient is receiving treatment. List of dedicated fax numbers for the Facilities:  FACILITY NAME UR FAX NUMBER   ADMISSION DENIALS (Administrative/Medical Necessity) [phone]   DISCHARGE SUPPORT TEAM (NETWORK) [phone]   PARENT CHILD HEALTH (Maternity/NICU/Pediatrics) [phone]   [facility] [phone]   [facility] [phone]   [facility] [phone]   [facility] [phone]   [facility] [phone]   [facility] [phone]   [facility] [phone]   [facility] [phone]   [facility] [phone]   [facility] [phone]   [facility] [phone]   [facility] [phone]

## 2024-04-23 NOTE — ASSESSMENT & PLAN NOTE
History of metastatic lung adenocarcinoma w/ mets to brain. Most recent staging: Stage JAY (cT3, cN1, cM1b)  He has undergone multiple rounds of chemotherapy and radiation and has undergone surgical resection for the metastatic brain lesion.  A PET scan on 3/25/2024 showed uptake in mediastinal lymph nodes compatible with metastasis.  Repeat MRI brain during current hospitalization showed stable postsurgical changes and lack of new lesions.  He has been taking Decadron at home for vasogenic edema of the brain.    Patient now presenting with new seizure activity.  He has been evaluated at the [facility] by our neurology colleagues.  He has been started on antiepileptic drugs but has continued to have breakthrough seizures.  He is being transferred to [facility] for video EEG monitoring and additional AED titration.    Assessment: Metastatic adenocarcinoma of the lung with metastasis to the brain s/p surgical resection and SRS on daily Decadron    Plan:  - Continue Decadron at 4 mg/day  - Neurology will weigh in on oncology consultation while hospitalized. On initial evaluation no immediate need for oncology recommendations

## 2024-04-23 NOTE — ASSESSMENT & PLAN NOTE
Patient was originally admitted to the [facility] on 4/20/2024 after acute onset tremors of the right upper and lower extremity.  He does have a history of stroke with left-sided weakness and his new tremors were thought to be related to his previous neurologic injury.  Initial stroke workup was negative and neurology was consulted.  Imaging was obtained including MRI that showed no additional evidence of stroke/TIA. Neurology then began an evaluation for seizure like activity in this patient because of his history of brain metastasis and surgical resection of brain mets.  He was loaded with Keppra and was admitted for close monitoring.  Despite his initial AED dosing the patient's tremors continued.  At this point neurology increased his AED medication regimen with the addition of Vimpat to his twice daily Keppra. He was loaded with 200 mg Vimpat and will continue 100 mg twice daily. Neurology also recommended transfer to [facility] for video EEG monitoring and further titration of antiepileptic drugs.    Assessment: New onset partial seizures, concern for status     Plan:  - Neurology consulted.  Will follow up any additional recommendations  - Video EEG discontinued  - Continue Keppra 2 g twice daily  - Continue Vimpat 200 mg twice daily  - Started Onfi on 4/24/24  - Scheduled Ativan discontinued  - Neurochecks every 4  - Close hemodynamic monitoring

## 2024-04-23 NOTE — QUICK NOTE
Inpatient consult to Neurology  Consult performed by: Ayo Zimmerman DO  Consult ordered by: George Burnett MD        Please see original consult note by Dr. Salcedo on 4/21/24. Neurology to continue following at [facility].

## 2024-04-23 NOTE — PROGRESS NOTES
NEUROLOGY RESIDENCY PROGRESS NOTE   Name: Paras Pitts   Age & Sex: 68 y.o. male   MRN: [id number]  Unit/Bed#: [facility] 721-01   Encounter: [id number]    ASSESSMENT & PLAN   Seizure-like activity (HCC)  Assessment & Plan  Patient is a 68 year old male who presented to the ED for continuous movements of his LUE and LLE. Patient has a PMH of HTN, hypercholesterolemia, metastatic adenocarcinoma, CVA, tremors, and weakness. Upon evaluation at bedside, patient stated that the double vision is very far away. He stated that he saw double cars en route to [facility]. Then, he stated that he was seeing 4 light bulbs in the parking lot through the window but there was a reflection which made the 2 light bulbs seem to be 4. At this time, the LUE movements have resolved but the LLE continues to have rhythmic movements.      Impression: Patient's movements seem to be consistent with the EPC in the setting of metastatic adenocarcinoma.     Plan:  Continue vEEG  Continue Keppra 2 g twice daily  Continue Vimpat 100 mg twice daily  Initiate Ativan 0.5 mg every 6 scheduled  Recommend increasing the dexamethasone from 1 mg to 4 mg BID as per radiation oncology notes  Rest of care as per primary team         SUBJECTIVE   Patient was seem and examined this morning at bedside. No acute events over night.     Review of Systems   Constitutional:  Negative for chills, fatigue, fever and unexpected weight change.   HENT:  Negative for drooling, hearing loss, sinus pressure, sinus pain, tinnitus, trouble swallowing and voice change.    Eyes:  Negative for photophobia and visual disturbance.   Respiratory:  Negative for chest tightness and shortness of breath.    Cardiovascular:  Negative for chest pain, palpitations and leg swelling.   Gastrointestinal:  Negative for constipation, diarrhea and nausea.   Endocrine: Negative for cold intolerance.   Genitourinary:  Negative for difficulty urinating.   Musculoskeletal:  Negative  for arthralgias, back pain, gait problem, myalgias, neck pain and neck stiffness.   Skin:  Negative for pallor and rash.   Neurological:  Positive for seizures. Negative for dizziness, tremors, syncope, facial asymmetry, speech difficulty, weakness, light-headedness, numbness and headaches.   Psychiatric/Behavioral:  Negative for behavioral problems, confusion, decreased concentration and sleep disturbance.      OBJECTIVE   Patient ID: Paras Pitts is a 68 y.o. male.  Vitals:    24 2028 24 0744 24 1118   BP: 142/94 144/93 141/91   Pulse: 83 81 81   Resp: 16 16 18   Temp: 97.5 °F (36.4 °C) (!) 97.4 °F (36.3 °C) 97.9 °F (36.6 °C)   SpO2: 96% 96% 96%        Temperature:   Temp (24hrs), Av.6 °F (36.4 °C), Min:97.4 °F (36.3 °C), Max:97.9 °F (36.6 °C)    Temperature: 97.9 °F (36.6 °C)    GENERAL EXAM:  Constitutional:Alert. Not in acute distress. Not ill-appearing, toxic-appearing or diaphoretic.   HENT: Normocephalic and atraumatic. Nose and Ears normal.   Eyes: No scleral icterus. No discharge.   Neck: Neck Supple. ROM normal.  Cardiovascular: Distal extremities warm without palpable edema or tenderness, no observed significant swelling.   Pulmonary: Pulmonary effort is normal. Not in respiratory distress  Abdominal: Abdomen is flat and not distended  Musculoskeletal: No swelling or deformity.  Skin: Warm and dry  Psychiatric: Normal behavior and appropriate affect     NEUROLOGIC  EXAM:  Mental Status: alertness: alert, orientation: time, date, person, place, city, president, speech:fluent, affect: normal, thought content exhibits logical connections  Cranial Nerves:  II: Pupils equal, round, reactive to light and accommodation, Visual Fields normal  III, IV, VI: EOM full and intact  V: facial sensation was normal and symmetrical  VII: Facial symmetry:facial symmetry equal  VIII: normal hearing to speech  IX, X: normal palatal elevation, no uvular deviation  XI: 5/5 head turn and 5/5 shoulder shrug  bilaterally  XII: midline tongue protrusion  Motor: Normal bulk, tone, no involuntary movements or tremors     DELTOID   BICEP   TRICEPS   WRIST  EXTENSION   WRIST  FLEXION      RIGHT 5 5 5 5 5 5   LEFT 2 2 2 3 3 3      HIP  FLEXION KNEE  EXTENSION DORSI   PLANTAR     RIGHT 5 5 5 5   LEFT 1 0 0 0   Reflexes: No clonus, no Meier's, no cross abductors, toes down     BICEP   TRICEPS   BRACHIO   PATELLAR   ACHILLES   RIGHT 2+ 2+ 2+ 2+ 2+   LEFT 2+ 2+ 2+ 2+ 2+   Sensory:Normal light touch sensation  Station/Gait: Deffered  LABORATORY DATA   Labs: I have personally reviewed pertinent reports.    Results from last 7 days   Lab Units 04/23/24  0528 04/22/24  0503 04/21/24  0426 04/20/24  1345   WBC Thousand/uL 7.71 7.34 7.92 11.63*   HEMOGLOBIN g/dL 13.6 13.9 13.6 15.3   HEMATOCRIT % 41.2 42.3 40.5 46.3   PLATELETS Thousands/uL 137* 128* 124* 166   SEGS PCT % 62 65  --   --    MONO PCT % 7 6  --  5   EOS PCT % 3 2  --  1      Results from last 7 days   Lab Units 04/23/24  0528 04/22/24  0503 04/21/24  0426 04/20/24  1345   SODIUM mmol/L 138 139 141 141   POTASSIUM mmol/L 3.7 3.7 3.4* 4.0   CHLORIDE mmol/L 102 104 105 102   CO2 mmol/L 27 28 28 23   BUN mg/dL 17 13 12 16   CREATININE mg/dL 0.78 0.80 0.76 0.95   CALCIUM mg/dL 8.8 9.0 9.0 9.7   ALK PHOS U/L  --   --   --  48   ALT U/L  --   --   --  27   AST U/L  --   --   --  25     Results from last 7 days   Lab Units 04/20/24  1345   MAGNESIUM mg/dL 1.9     Results from last 7 days   Lab Units 04/20/24  1345   PHOSPHORUS mg/dL 3.5      Results from last 7 days   Lab Units 04/20/24  1345   INR  0.92   PTT seconds 22*             IMAGING & DIAGNOSTIC TESTING   Radiology Results: I have personally reviewed pertinent reports.      No orders to display       Other Diagnostic Testing: I have personally reviewed pertinent reports.    ACTIVE MEDICATIONS     Current Facility-Administered Medications   Medication Dose Route Frequency    acetaminophen (TYLENOL) tablet 975 mg  975  mg Oral Q8H PRN    aluminum-magnesium hydroxide-simethicone (MAALOX) oral suspension 30 mL  30 mL Oral Q6H PRN    amLODIPine (NORVASC) tablet 10 mg  10 mg Oral Daily    atorvastatin (LIPITOR) tablet 40 mg  40 mg Oral After Dinner    dexamethasone (DECADRON) tablet 4 mg  4 mg Oral Daily    enoxaparin (LOVENOX) subcutaneous injection 40 mg  40 mg Subcutaneous Daily    lacosamide (VIMPAT) injection 100 mg  100 mg Intravenous Q12H    levETIRAcetam (KEPPRA) injection 2,000 mg  2,000 mg Intravenous Q12H LEN    LORazepam (ATIVAN) injection 0.5 mg  0.5 mg Intravenous Q6H    losartan (COZAAR) tablet 50 mg  50 mg Oral Daily    ondansetron (ZOFRAN) injection 4 mg  4 mg Intravenous Q6H PRN    pantoprazole (PROTONIX) EC tablet 40 mg  40 mg Oral Early Morning    polyethylene glycol (MIRALAX) packet 17 g  17 g Oral Daily    pramipexole (MIRAPEX) tablet 0.25 mg  0.25 mg Oral TID       Prior to Admission medications    Medication Sig Start Date End Date Taking? Authorizing Provider   amLODIPine (NORVASC) 10 mg tablet Take 1 tablet (10 mg total) by mouth daily 2/27/24   Maikel Valencia DO   atorvastatin (LIPITOR) 40 mg tablet Take 1 tablet (40 mg total) by mouth daily after dinner 4/2/24 5/2/24  Maikel Valencia DO   dexamethasone (DECADRON) 2 mg tablet Take 1 tablet (2 mg total) by mouth 2 (two) times a day with meals  Patient taking differently: Take 1 mg by mouth daily with breakfast 3/22/24   James Contreras MD   gabapentin (Neurontin) 300 mg capsule Take 1 capsule (300 mg total) by mouth 3 (three) times a day 12/13/23 4/10/24  Shante Connelly PA-C   hydroCHLOROthiazide 25 mg tablet Take 25 mg by mouth daily  Patient not taking: Reported on 4/20/2024 3/14/24   Historical Provider, MD   losartan (COZAAR) 50 mg tablet Take 1 tablet (50 mg total) by mouth daily 4/2/24   Maikel Valencia DO   pantoprazole (PROTONIX) 40 mg tablet Take 1 tablet (40 mg total) by mouth daily 4/10/24   James Reeves  MD Ben       VTE Mechanical Prophylaxis: sequential compression device     ==  Jerson Bhakta DO  [facility] Neurology Residency, PGY-2

## 2024-04-23 NOTE — PLAN OF CARE
Problem: OCCUPATIONAL THERAPY ADULT  Goal: Performs self-care activities at highest level of function for planned discharge setting.  See evaluation for individualized goals.  Description: Treatment Interventions: ADL retraining, Functional transfer training, UE strengthening/ROM, Endurance training, Cognitive reorientation, Patient/family training, Equipment evaluation/education, Compensatory technique education, Energy conservation, Activityengagement, Continued evaluation          See flowsheet documentation for full assessment, interventions and recommendations.   Note: Limitation: Decreased ADL status, Decreased endurance, Decreased self-care trans, Decreased high-level ADLs, Decreased cognition, Decreased Safe judgement during ADL, Decreased UE strength, Decreased UE ROM, Visual deficit, Decreased fine motor control  Prognosis: Good  Assessment: Pt is a 68 y.o. male who was admitted to [facility] on 4/22/2024 with acute on residual left sided weakness,  Tremors of nervous system , generalized weakness, history of CVA. Patient  At baseline pt was completing I with ADL's/IADL's, no AD with functional mobility. Pt lives with spouse in a house with [place]. Currently pt requires mod/max a x 2 with B/l UE  for overall ADLS and  for functional mobility/transfers. Pt currently presents with impairments in the following categories -difficulty performing ADLS and difficulty performing IADLS  activity tolerance and endurance. These impairments, as well as pt's fatigue, decreased caregiver support, and risk for falls  limit pt's ability to safely engage in all baseline areas of occupation, includingbathing, dressing, toileting, functional mobility/transfers, community mobility, laundry , driving, house maintenance, medication management, meal prep, social participation , and leisure activities  From OT standpoint, recommend level max I upon D/C. The patient's raw score on the AM-PAC Daily Activity Inpatient Short  Form is 13. A raw score of less than 19 suggests the patient may benefit from discharge to post-acute rehabilitation services. Please refer to the recommendation of the Occupational Therapist for safe discharge planning. OT will continue to follow to address the below stated goals.     Rehab Resource Intensity Level, OT: I (Maximum Resource Intensity)

## 2024-04-23 NOTE — OCCUPATIONAL THERAPY NOTE
Occupational Therapy Evaluation     Patient Name: Paras Pitts  Today's Date: 4/23/2024  Problem List  Principal Problem:    Tremors of nervous system  Active Problems:    Essential hypertension    Hypercholesterolemia    Metastatic adenocarcinoma (HCC)    Carcinoma of right lung (HCC)    History of CVA (cerebrovascular accident)    Generalized weakness    Seizure-like activity (HCC)    Past Medical History  Past Medical History:   Diagnosis Date    Brain compression (HCC) 03/20/2023    Brain mass 03/20/2023    Cancer (HCC) 2001    Receint lung and brain lesions and prostate cancer in 2001    Cerebral edema (HCC) 03/20/2023    Hypertension     Prostate cancer (HCC) 2001    Rectal bleeding     Stroke (HCC)     2011       Past Surgical History  Past Surgical History:   Procedure Laterality Date    COLONOSCOPY      CRANIOTOMY Right 3/23/2023    Procedure: Right frontal CRANIOTOMY IMAGE-GUIDED FOR TUMOR;  Surgeon: Clark Carrillo MD;  [facility];  Service: Neurosurgery    ENDOBRONCHIAL ULTRASOUND (EBUS) N/A 4/16/2024    Procedure: ENDOBRONCHIAL ULTRASOUND (EBUS);  Surgeon: Kalia Sparrow MD;  [facility];  Service: Thoracic    IR PORT PLACEMENT  4/27/2023    NE BRNCHSC INCL FLUOR GDNCE DX W/CELL WASHG SPX N/A 9/1/2023    Procedure: BRONCHOSCOPY FLEXIBLE;  Surgeon: Kalia Sparrow MD;  [facility];  Service: Thoracic    NE BRNCHSC INCL FLUOR GDNCE DX W/CELL WASHG SPX N/A 4/16/2024    Procedure: BRONCHOSCOPY FLEXIBLE;  Surgeon: Kalia Sparrow MD;  [facility];  Service: Thoracic    NE CYSTOURETHROSCOPY N/A 3/23/2023    Procedure: EUA, DEL CASTILLO INSERTION;  Surgeon: Ajay Piedra MD;  [facility];  Service: Urology    NE THORACOSCOPY W/LOBECTOMY SINGLE LOBE Right 9/1/2023    Procedure: robotic assisted converted to open right upper lobectomy, lymph node dissection;  Surgeon: Kalia Sparrow MD;  [facility];  Service: Thoracic    PROSTATECTOMY  2001     THORACOTOMY Right 9/1/2023    Procedure: right thoracotomy with Cryo-Ablation;  Surgeon: Kalia Sparrow MD;  [facility];  Service: Thoracic    TONSILLECTOMY        04/23/24 0815   OT Last Visit   OT Visit Date 04/23/24   Note Type   Note type Evaluation   Pain Assessment   Pain Assessment Tool 0-10   Pain Score No Pain   Restrictions/Precautions   Weight Bearing Precautions Per Order No   Other Precautions Telemetry;Multiple lines;Fall Risk;Cognitive;Chair Alarm;Bed Alarm  (+EEG, left/new and residual hemiparesis/tremors)   Home Living   Type of Home House   Home Layout One level;Stairs to enter with rails  (4STE)   Bathroom Shower/Tub Tub/shower unit   Bathroom Toilet Standard   Bathroom Equipment Grab bars in shower   Bathroom Accessibility Accessible   Home Equipment Cane   Prior Function   Level of [place] Independent with functional mobility;Independent with ADLs;Independent with IADLS   Lives With Spouse   Receives Help From Family   IADLs Independent with driving;Independent with meal prep;Independent with medication management   Falls in the last 6 months 1 to 4   Vocational Retired   Lifestyle   Autonomy I with ADL's/IaDL's, no AD with functional mobility +drives   Reciprocal Relationships spouse -works during the day   Service 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   Intrinsic Gratification working   General   Family/Caregiver Present No   ADL   Eating Assistance 5  Supervision/Setup   Eating Deficit Setup   Grooming Assistance 5  Supervision/Setup   UB Bathing Assistance 4  Minimal Assistance   LB Bathing Assistance 2  Maximal Assistance   UB Dressing Assistance 4  Minimal Assistance   LB Dressing Assistance 2  Maximal Assistance   Toileting Assistance  2  Maximal Assistance   Bed Mobility   Supine to Sit 3  Moderate assistance   Additional items HOB elevated;Assist x 2;Increased time required;Verbal cues;LE management   Transfers   Sit to Stand 3  Moderate assistance    Additional items Assist x 2   Stand to Sit 3  Moderate assistance   Additional items Assist x 2   Additional Comments +B/l HHA and knees blocked   Functional Mobility   Functional Mobility 2  Maximal assistance   Additional Comments mod a x 2 with B/l HHA and knees blocked and able to step with right LE   Balance   Static Sitting Fair -   Dynamic Sitting Poor +   Static Standing Poor   Dynamic Standing Poor -   Ambulatory Poor -   Activity Tolerance   Activity Tolerance Patient limited by fatigue   Medical Staff Made Aware PT Arben due to the patient's co-morbidities, clinically unstable presentation, and present impairments which are a regression from the patient's baseline.    Nurse Made Aware RN cleared pt for therapy   RUE Assessment   RUE Assessment WFL   LUE Assessment   LUE Assessment (Residual/acute left UE hemiparesis 4-/5 distal>proximal)   Hand Function   Gross Motor Coordination Impaired  (+tremors)   Fine Motor Coordination Impaired  (finger to thumb seqeuncing)   Vision-Basic Assessment   Current Vision Wears glasses all the time   Vision - Complex Assessment   Ocular Range of Motion Intact  (mild jerky smooth pursuits, difficulty with visual attention during assessment + lost ability to attend to moving object with testing 3x.)   Tracking Intact   Convergence (slow convergence of right eye -requiring 2 trials to increase ability with accuracy of assesssment)   Acuity Able to read clock/calendar on wall    Additional Comments pt report double vision (in ambulance on way to [facility] noted double vision) pt reports has improved.   Cognition   Overall Cognitive Status Impaired   Arousal/Participation Alert;Responsive;Cooperative   Attention Attends with cues to redirect   Orientation Level Oriented to person;Oriented to place;Oriented to time;Disoriented to situation   Memory Decreased recall of precautions;Decreased recall of recent events;Decreased short term memory   Following Commands Follows one step  commands with increased time or repetition   Comments pt motivated for therapy, slow processing, difficulty following >2-3 step functional directives, decreased attention/STM with medical events, decreased insight into deficits with session. Will continue to assess functional cognition pt works/drives.   Assessment   Limitation Decreased ADL status;Decreased endurance;Decreased self-care trans;Decreased high-level ADLs;Decreased cognition;Decreased Safe judgement during ADL;Decreased UE strength;Decreased UE ROM;Visual deficit;Decreased fine motor control   Prognosis Good   Assessment Pt is a 68 y.o. male who was admitted to [facility] on 4/22/2024 with acute/residual left sided weakness,  Tremors of nervous system , generalized weakness, history of CVA. Patient   has a past medical history of Brain compression ([facility]) (03/20/2023), Brain mass (03/20/2023), Cancer ([facility]) (2001), Cerebral edema ([facility]) (03/20/2023), Hypertension, Prostate cancer ([facility]) (2001), Rectal bleeding, and Stroke ([facility]). At baseline pt was completing I with ADL's/IADL's, no AD with functional mobility. Pt lives with spouse in a house with ZACH. Currently pt requires mod/max a x 2 with B/l UE  for overall ADLS and  for functional mobility/transfers. Pt currently presents with impairments in the following categories -difficulty performing ADLS and difficulty performing IADLS  activity tolerance and endurance. These impairments, as well as pt's fatigue, decreased caregiver support, and risk for falls  limit pt's ability to safely engage in all baseline areas of occupation, includingbathing, dressing, toileting, functional mobility/transfers, community mobility, laundry , driving, house maintenance, medication management, meal prep, social participation , and leisure activities  From OT standpoint, recommend level max I upon D/C. The patient's raw score on the AM-PAC Daily Activity Inpatient Short Form is 13. A raw score of less than 19  suggests the patient may benefit from discharge to post-acute rehabilitation services. Please refer to the recommendation of the Occupational Therapist for safe discharge planning. OT will continue to follow to address the below stated goals.   Goals   Patient Goals go home   LTG Time Frame 10-14   Long Term Goal #1 see goals below   Plan   Treatment Interventions ADL retraining;Functional transfer training;UE strengthening/ROM;Endurance training;Cognitive reorientation;Patient/family training;Equipment evaluation/education;Compensatory technique education;Energy conservation;Activityengagement;Continued evaluation   Goal Expiration Date 05/07/24   OT Frequency 2-3x/wk   Discharge Recommendation   Rehab Resource Intensity Level, OT I (Maximum Resource Intensity)   AM-PAC Daily Activity Inpatient   Lower Body Dressing 2   Bathing 2   Toileting 2   Upper Body Dressing 2   Grooming 2   Eating 3   Daily Activity Raw Score 13   Daily Activity Standardized Score (Calc for Raw Score >=11) 32.03   AM-PAC Applied Cognition Inpatient   Following a Speech/Presentation 2   Understanding Ordinary Conversation 4   Taking Medications 2   Remembering Where Things Are Placed or Put Away 3   Remembering List of 4-5 Errands 2   Taking Care of Complicated Tasks 1   Applied Cognition Raw Score 14   Applied Cognition Standardized Score 32.02   End of Consult   Patient Position at End of Consult Supine;All needs within reach;Bed/Chair alarm activated   Nurse Communication Nurse aware of consult       Occupational Therapy Goals:    *Mod I with bed mobility to engage in functional tasks.  *Mod I Adl's after setup with use of AE PRN  *Mod I toileting and clothing management   *Mod I functional mobility and transfers to/from all surfaces with Fair + dynamic balance and safety for participation in dynamic adls and iadl tasks   *Demonstrate good carryover with safe use of RW during functional tasks   *Assess DME needs   *Increase activity  tolerance to 25-30 minutes for participation in adls and enjoyable activities  *Pt to participate in further cognitive testing with good attention and participation to assist with safe d/c recommendations  *Mod I with Simulated IADL management task.  *Demonstrate good carryover of pt/family education and training with good tolerance for increased safety and independence with ADL's/ADl's.  *Pt will improve standing balance to 4-5 minutes with functional tasks to increase I with toileting/transfers.  *Patient will demonstrate 100% carryover of energy conservation techniques t/o functional I/ADL/leisure tasks w/o cues s/p skilled education to increase endurance during functional tasks  *Pt will follow 100% simple one step verbal commands and be A/Ox4 consistently t/o use of external environmental cues w/ mod I  *Patient will follow multistep instructions with no cueing to increase cognitive function and independence with tasks  *Pt will participate in fine/gross motor coordination/strenthening/dexterity exercises to Good in order to increase participation in functional activities.   *Pt will improve B/L UE ROM 1/2 MMT via AROM/AAROM/PROM in all planes as tolerated in order to participate in functional activities.  *Pt will engage in ongoing visual perceptual assessments, screenings, and activities to improve ADL performance, as well as to assist in safety/d/c planning.   *Pt will tolerate mod manual NDT facilitation to L UE during functional ADL/leisure tasks with mod I to improve functional engagement to increase neuroplastic recovery  Zaynab Gupta OTDEANDRE/L

## 2024-04-23 NOTE — PLAN OF CARE
Problem: Prexisting or High Potential for Compromised Skin Integrity  Goal: Skin integrity is maintained or improved  Description: INTERVENTIONS:  - Identify patients at risk for skin breakdown  - Assess and monitor skin integrity  - Assess and monitor nutrition and hydration status  - Monitor labs   - Assess for incontinence   - Turn and reposition patient  - Assist with mobility/ambulation  - Relieve pressure over bony prominences  - Avoid friction and shearing  - Provide appropriate hygiene as needed including keeping skin clean and dry  - Evaluate need for skin moisturizer/barrier cream  - Collaborate with interdisciplinary team   - Patient/family teaching  - Consider wound care consult   Outcome: Progressing     Problem: NEUROSENSORY - ADULT  Goal: Achieves stable or improved neurological status  Description: INTERVENTIONS  - Monitor and report changes in neurological status  - Monitor vital signs such as temperature, blood pressure, glucose, and any other labs ordered   - Initiate measures to prevent increased intracranial pressure  - Monitor for seizure activity and implement precautions if appropriate      Outcome: Progressing  Goal: Remains free of injury related to seizures activity  Description: INTERVENTIONS  - Maintain airway, patient safety  and administer oxygen as ordered  - Monitor patient for seizure activity, document and report duration and description of seizure to physician/advanced practitioner  - If seizure occurs,  ensure patient safety during seizure  - Reorient patient post seizure  - Seizure pads on all 4 side rails  - Instruct patient/family to notify RN of any seizure activity including if an aura is experienced  - Instruct patient/family to call for assistance with activity based on nursing assessment  - Administer anti-seizure medications if ordered    Outcome: Progressing     Problem: MUSCULOSKELETAL - ADULT  Goal: Maintain or return mobility to safest level of  function  Description: INTERVENTIONS:  - Assess patient's ability to carry out ADLs; assess patient's baseline for ADL function and identify physical deficits which impact ability to perform ADLs (bathing, care of mouth/teeth, toileting, grooming, dressing, etc.)  - Assess/evaluate cause of self-care deficits   - Assess range of motion  - Assess patient's mobility  - Assess patient's need for assistive devices and provide as appropriate  - Encourage maximum independence but intervene and supervise when necessary  - Involve family in performance of ADLs  - Assess for home care needs following discharge   - Consider OT consult to assist with ADL evaluation and planning for discharge  - Provide patient education as appropriate  Outcome: Progressing     Problem: PAIN - ADULT  Goal: Verbalizes/displays adequate comfort level or baseline comfort level  Description: Interventions:  - Encourage patient to monitor pain and request assistance  - Assess pain using appropriate pain scale  - Administer analgesics based on type and severity of pain and evaluate response  - Implement non-pharmacological measures as appropriate and evaluate response  - Consider cultural and social influences on pain and pain management  - Notify physician/advanced practitioner if interventions unsuccessful or patient reports new pain  Outcome: Progressing     Problem: DISCHARGE PLANNING  Goal: Discharge to home or other facility with appropriate resources  Description: INTERVENTIONS:  - Identify barriers to discharge w/patient and caregiver  - Arrange for needed discharge resources and transportation as appropriate  - Identify discharge learning needs (meds, wound care, etc.)  - Arrange for interpretive services to assist at discharge as needed  - Refer to Case Management Department for coordinating discharge planning if the patient needs post-hospital services based on physician/advanced practitioner order or complex needs related to functional  status, cognitive ability, or social support system  Outcome: Progressing

## 2024-04-23 NOTE — ASSESSMENT & PLAN NOTE
Patient is a 68 year old male who presented to the ED for continuous movements of his LUE and LLE. Patient has a PMH of HTN, hypercholesterolemia, metastatic adenocarcinoma, CVA, tremors, and weakness. Upon evaluation at bedside, patient stated that the double vision is very far away. He stated that he saw double cars en route to [facility]. Then, he stated that he was seeing 4 light bulbs in the parking lot through the window but there was a reflection which made the 2 light bulbs seem to be 4. At this time, the LUE movements have resolved but the LLE continues to have rhythmic movements.      Impression: Patient's movements seem to be consistent with the EPC in the setting of metastatic adenocarcinoma.     Plan:  Discontinue vEEG  Continue Keppra 2 g twice daily  Continue Vimpat 100 mg twice daily  Discontinue Ativan 0.5 mg every 6 scheduled  Will start Onfi 5 mg BID  Recommend increasing the dexamethasone from 1 mg to 4 mg BID as per radiation oncology notes  Rest of care as per primary team

## 2024-04-23 NOTE — OCCUPATIONAL THERAPY NOTE
Occupational Therapy Evaluation     Patient Name: Paras Pitts  Today's Date: 4/23/2024  Problem List  Principal Problem:    Tremors of nervous system  Active Problems:    Essential hypertension    Hypercholesterolemia    Metastatic adenocarcinoma (HCC)    Carcinoma of right lung (HCC)    History of CVA (cerebrovascular accident)    Generalized weakness    Seizure-like activity (HCC)    Past Medical History  Past Medical History:   Diagnosis Date    Brain compression (HCC) 03/20/2023    Brain mass 03/20/2023    Cancer (HCC) 2001    Receint lung and brain lesions and prostate cancer in 2001    Cerebral edema (HCC) 03/20/2023    Hypertension     Prostate cancer (HCC) 2001    Rectal bleeding     Stroke (HCC)     2011       Past Surgical History  Past Surgical History:   Procedure Laterality Date    COLONOSCOPY      CRANIOTOMY Right 3/23/2023    Procedure: Right frontal CRANIOTOMY IMAGE-GUIDED FOR TUMOR;  Surgeon: Clark Carrillo MD;  [facility];  Service: Neurosurgery    ENDOBRONCHIAL ULTRASOUND (EBUS) N/A 4/16/2024    Procedure: ENDOBRONCHIAL ULTRASOUND (EBUS);  Surgeon: Kalia Sparrow MD;  [facility];  Service: Thoracic    IR PORT PLACEMENT  4/27/2023    ND BRNCHSC INCL FLUOR GDNCE DX W/CELL WASHG SPX N/A 9/1/2023    Procedure: BRONCHOSCOPY FLEXIBLE;  Surgeon: Kalia Sparrow MD;  [facility];  Service: Thoracic    ND BRNCHSC INCL FLUOR GDNCE DX W/CELL WASHG SPX N/A 4/16/2024    Procedure: BRONCHOSCOPY FLEXIBLE;  Surgeon: Kalia Sparrow MD;  [facility];  Service: Thoracic    ND CYSTOURETHROSCOPY N/A 3/23/2023    Procedure: EUA, DEL CASTILLO INSERTION;  Surgeon: Ajay Piedra MD;  [facility];  Service: Urology    ND THORACOSCOPY W/LOBECTOMY SINGLE LOBE Right 9/1/2023    Procedure: robotic assisted converted to open right upper lobectomy, lymph node dissection;  Surgeon: Kalia Sparrow MD;  [facility];  Service: Thoracic    PROSTATECTOMY  2001     THORACOTOMY Right 9/1/2023    Procedure: right thoracotomy with Cryo-Ablation;  Surgeon: Kalia Sparrow MD;  [facility];  Service: Thoracic    TONSILLECTOMY           04/23/24 0815   OT Last Visit   OT Visit Date 04/23/24   Note Type   Note type Evaluation   Pain Assessment   Pain Assessment Tool 0-10   Pain Score No Pain   Restrictions/Precautions   Weight Bearing Precautions Per Order No   Other Precautions Telemetry;Multiple lines;Fall Risk;Cognitive;Chair Alarm;Bed Alarm  (+EEG, left residual hemiparesis)   Home Living   Type of Home House   Home Layout One level;Stairs to enter with rails  (4STE)   Bathroom Shower/Tub Tub/shower unit   Bathroom Toilet Standard   Bathroom Equipment Grab bars in shower   Bathroom Accessibility Accessible   Home Equipment Cane   Prior Function   Level of [place] Independent with functional mobility;Independent with ADLs;Independent with IADLS   Lives With Spouse   Receives Help From Family   IADLs Independent with driving;Independent with meal prep;Independent with medication management   Falls in the last 6 months 1 to 4   Vocational Retired   Lifestyle   Autonomy I with ADL's/IaDL's, no AD with functional mobility +drives   Reciprocal Relationships spouse -works during the day   Service 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   Intrinsic Gratification working   General   Family/Caregiver Present No   ADL   Eating Assistance 5  Supervision/Setup   Eating Deficit Setup   Grooming Assistance 5  Supervision/Setup   UB Bathing Assistance 4  Minimal Assistance   LB Bathing Assistance 2  Maximal Assistance   UB Dressing Assistance 4  Minimal Assistance   LB Dressing Assistance 2  Maximal Assistance   Toileting Assistance  2  Maximal Assistance   Bed Mobility   Supine to Sit 3  Moderate assistance   Additional items HOB elevated;Assist x 2;Increased time required;Verbal cues;LE management   Transfers   Sit to Stand 3  Moderate assistance   Additional  items Assist x 2   Stand to Sit 3  Moderate assistance   Additional items Assist x 2   Additional Comments +B/l HHA and knees blocked   Functional Mobility   Functional Mobility 2  Maximal assistance   Additional Comments mod a x 2 with B/l HHA and knees blocked and able to step with right LE   Balance   Static Sitting Fair -   Dynamic Sitting Poor +   Static Standing Poor   Dynamic Standing Poor -   Ambulatory Poor -   Activity Tolerance   Activity Tolerance Patient limited by fatigue   Medical Staff Made Aware PT Arben   Nurse Made Aware RN cleared pt for therapy   RUE Assessment   RUE Assessment WFL   LUE Assessment   LUE Assessment (Residual and new left UE hemiparesis 4-/5 distal>proximal)   Hand Function   Gross Motor Coordination Impaired  (+tremors)   Fine Motor Coordination Impaired  (finger to thumb seqeuncing)   Vision-Basic Assessment   Current Vision Wears glasses all the time   Vision - Complex Assessment   Ocular Range of Motion Intact  (mild jerky smooth pursuits, diffiuclty with visual attention with assesmesnt 3x lost ability to attend to moving object.)   Tracking Intact   Convergence   (slow convergence of right eye -erquiring 2 trials on increase convergence/divergence assesssment)   Acuity Able to read clock/calendar on wall without difficulty   Additional Comments pt report double vision (in ambulance noted double vision)   Cognition   Overall Cognitive Status Impaired   Arousal/Participation Alert;Responsive;Cooperative   Attention Attends with cues to redirect   Orientation Level Oriented to person;Oriented to place;Oriented to time;Disoriented to situation   Memory Decreased recall of precautions;Decreased recall of recent events;Decreased short term memory   Following Commands Follows one step commands with increased time or repetition   Comments pt motivated for therapy, slow processing, difficulty following >3 step directives   Assessment   Limitation Decreased ADL status;Decreased  endurance;Decreased self-care trans;Decreased high-level ADLs;Decreased cognition;Decreased Safe judgement during ADL;Decreased UE strength;Decreased UE ROM;Visual deficit;Decreased fine motor control   Prognosis Good   Assessment Pt is a 68 y.o. male who was admitted to [facility] on 4/22/2024 with acute on residual left sided weakness,  Tremors of nervous system , generalized weakness, history of CVA. Patient  At baseline pt was completing I with ADL's/IADL's, no AD with functional mobility. Pt lives with spouse in a house with ZACH. Currently pt requires mod/max a x 2 with B/l UE  for overall ADLS and  for functional mobility/transfers. Pt currently presents with impairments in the following categories -difficulty performing ADLS and difficulty performing IADLS  activity tolerance and endurance. These impairments, as well as pt's fatigue, decreased caregiver support, and risk for falls  limit pt's ability to safely engage in all baseline areas of occupation, includingbathing, dressing, toileting, functional mobility/transfers, community mobility, laundry , driving, house maintenance, medication management, meal prep, social participation , and leisure activities  From OT standpoint, recommend level max I upon D/C. The patient's raw score on the AM-PAC Daily Activity Inpatient Short Form is 13. A raw score of less than 19 suggests the patient may benefit from discharge to post-acute rehabilitation services. Please refer to the recommendation of the Occupational Therapist for safe discharge planning. OT will continue to follow to address the below stated goals.   Goals   Patient Goals go home   LTG Time Frame 10-14   Long Term Goal #1 see goals below   Plan   Treatment Interventions ADL retraining;Functional transfer training;UE strengthening/ROM;Endurance training;Cognitive reorientation;Patient/family training;Equipment evaluation/education;Compensatory technique education;Energy  conservation;Activityengagement;Continued evaluation   Goal Expiration Date 05/07/24   OT Frequency 2-3x/wk   Discharge Recommendation   Rehab Resource Intensity Level, OT I (Maximum Resource Intensity)   AM-PAC Daily Activity Inpatient   Lower Body Dressing 2   Bathing 2   Toileting 2   Upper Body Dressing 2   Grooming 2   Eating 3   Daily Activity Raw Score 13   Daily Activity Standardized Score (Calc for Raw Score >=11) 32.03   AM-PAC Applied Cognition Inpatient   Following a Speech/Presentation 2   Understanding Ordinary Conversation 4   Taking Medications 2   Remembering Where Things Are Placed or Put Away 3   Remembering List of 4-5 Errands 2   Taking Care of Complicated Tasks 1   Applied Cognition Raw Score 14   Applied Cognition Standardized Score 32.02   End of Consult   Patient Position at End of Consult Supine;All needs within reach;Bed/Chair alarm activated   Nurse Communication Nurse aware of consult

## 2024-04-23 NOTE — PLAN OF CARE
Problem: PHYSICAL THERAPY ADULT  Goal: Performs mobility at highest level of function for planned discharge setting.  See evaluation for individualized goals.  Description: Treatment/Interventions: Functional transfer training, LE strengthening/ROM, Therapeutic exercise, Endurance training, Cognitive reorientation, Patient/family training, Equipment eval/education, Bed mobility, Gait training, Spoke to nursing, Spoke to case management, OT          See flowsheet documentation for full assessment, interventions and recommendations.  Note: Prognosis: Fair  Problem List: Decreased strength, Decreased mobility, Impaired balance, Decreased endurance, Decreased cognition, Impaired judgement, Decreased safety awareness  Assessment: Pt is a 68 y.o. male admitted to [facility] on 4/22/2024 with primary medical dx of tremors of nervous system which are occurring on L side of body. Pt has the following comorbidities which affect their treatment: active problems listed above,  has a past medical history of Brain compression (HCC), Brain mass, Cancer (HCC), Cerebral edema (HCC), Hypertension, Prostate cancer (HCC), Rectal bleeding, and Stroke (HCC).  , h/o CVA with L sided weakness, as well as personal factors including stairs to access home and being +alone while wife at work. Pt has a high complexity clinical presentation due to Ongoing medical management for primary dx, Increased reliance on more restrictive AD compared to baseline, Decreased activity tolerance compared to baseline, Fall risk, Increased assistance needed from caregiver at current time, Ongoing telemetry monitoring, Cog status, Trending lab values, Diagnostic imaging pending, Continuous pulse oximetry monitoring  , and PMH. PT was consulted to evaluate pt's functional mobility and discharge needs. Upon evaluation, patient required modAx2 for bed mobility, modAx2 for STS transfers, maxAx2 for ambulation. Body system impairments include +LLE tremors,  impaired  cognition, vision, balance, L sided strength, endurance. Pt's functional activity impairments include: activity tolerance and mobility. Participation restrictions include inability to safely return home, community, or work. At conclusion of eval, pt remained supine in bed with bed alarm, phone, call bell, and all other personal needs within reach. Pt would benefit from skilled PT to address their functional mobility limitations. The patient's AM-PAC Basic Mobility Inpatient Short Form Raw Score is 7. A Raw score of less than or equal to 16 suggests the patient may benefit from discharge to post-acute rehabilitation services. Please also refer to the recommendation of the Physical Therapist for safe discharge planning.  Barriers to Discharge: Decreased caregiver support, Inaccessible home environment     Rehab Resource Intensity Level, PT: (S) I (Maximum Resource Intensity)    See flowsheet documentation for full assessment.

## 2024-04-24 LAB
ANION GAP SERPL CALCULATED.3IONS-SCNC: 10 MMOL/L (ref 4–13)
BUN SERPL-MCNC: 14 MG/DL (ref 5–25)
CALCIUM SERPL-MCNC: 9 MG/DL (ref 8.4–10.2)
CHLORIDE SERPL-SCNC: 102 MMOL/L (ref 96–108)
CO2 SERPL-SCNC: 28 MMOL/L (ref 21–32)
CREAT SERPL-MCNC: 0.72 MG/DL (ref 0.6–1.3)
GFR SERPL CREATININE-BSD FRML MDRD: 94 ML/MIN/1.73SQ M
GLUCOSE SERPL-MCNC: 151 MG/DL (ref 65–140)
POTASSIUM SERPL-SCNC: 3.9 MMOL/L (ref 3.5–5.3)
SODIUM SERPL-SCNC: 140 MMOL/L (ref 135–147)

## 2024-04-24 PROCEDURE — 99233 SBSQ HOSP IP/OBS HIGH 50: CPT | Performed by: FAMILY MEDICINE

## 2024-04-24 PROCEDURE — 99233 SBSQ HOSP IP/OBS HIGH 50: CPT | Performed by: PSYCHIATRY & NEUROLOGY

## 2024-04-24 PROCEDURE — 80048 BASIC METABOLIC PNL TOTAL CA: CPT

## 2024-04-24 PROCEDURE — C9254 INJECTION, LACOSAMIDE: HCPCS

## 2024-04-24 PROCEDURE — 95720 EEG PHY/QHP EA INCR W/VEEG: CPT | Performed by: PSYCHIATRY & NEUROLOGY

## 2024-04-24 RX ORDER — CLOBAZAM 10 MG/1
5 TABLET ORAL 2 TIMES DAILY
Status: DISCONTINUED | OUTPATIENT
Start: 2024-04-24 | End: 2024-05-01 | Stop reason: HOSPADM

## 2024-04-24 RX ORDER — SENNOSIDES 8.6 MG
1 TABLET ORAL
Status: DISCONTINUED | OUTPATIENT
Start: 2024-04-24 | End: 2024-04-24

## 2024-04-24 RX ORDER — LACOSAMIDE 200 MG/1
200 TABLET ORAL EVERY 12 HOURS SCHEDULED
Status: DISCONTINUED | OUTPATIENT
Start: 2024-04-24 | End: 2024-05-01 | Stop reason: HOSPADM

## 2024-04-24 RX ORDER — POLYETHYLENE GLYCOL 3350 17 G/17G
17 POWDER, FOR SOLUTION ORAL 2 TIMES DAILY
Status: DISCONTINUED | OUTPATIENT
Start: 2024-04-24 | End: 2024-04-29

## 2024-04-24 RX ORDER — ONDANSETRON 4 MG/1
4 TABLET, ORALLY DISINTEGRATING ORAL EVERY 6 HOURS PRN
Status: DISCONTINUED | OUTPATIENT
Start: 2024-04-24 | End: 2024-05-01 | Stop reason: HOSPADM

## 2024-04-24 RX ORDER — SENNOSIDES 8.6 MG
1 TABLET ORAL
Status: DISCONTINUED | OUTPATIENT
Start: 2024-04-24 | End: 2024-05-01 | Stop reason: HOSPADM

## 2024-04-24 RX ORDER — LORAZEPAM 2 MG/ML
0.5 INJECTION INTRAMUSCULAR EVERY 8 HOURS
Status: DISCONTINUED | OUTPATIENT
Start: 2024-04-24 | End: 2024-04-24

## 2024-04-24 RX ORDER — BISACODYL 10 MG
10 SUPPOSITORY, RECTAL RECTAL DAILY PRN
Status: DISCONTINUED | OUTPATIENT
Start: 2024-04-24 | End: 2024-05-01 | Stop reason: HOSPADM

## 2024-04-24 RX ADMIN — AMLODIPINE BESYLATE 10 MG: 10 TABLET ORAL at 08:21

## 2024-04-24 RX ADMIN — ACETAMINOPHEN 975 MG: 325 TABLET, FILM COATED ORAL at 08:34

## 2024-04-24 RX ADMIN — LEVETIRACETAM 2000 MG: 100 INJECTION, SOLUTION INTRAVENOUS at 08:20

## 2024-04-24 RX ADMIN — LACOSAMIDE 200 MG: 200 TABLET, FILM COATED ORAL at 21:37

## 2024-04-24 RX ADMIN — LORAZEPAM 0.5 MG: 2 INJECTION INTRAMUSCULAR; INTRAVENOUS at 10:59

## 2024-04-24 RX ADMIN — LOSARTAN POTASSIUM 50 MG: 50 TABLET, FILM COATED ORAL at 08:21

## 2024-04-24 RX ADMIN — PRAMIPEXOLE DIHYDROCHLORIDE 0.25 MG: 0.25 TABLET ORAL at 08:20

## 2024-04-24 RX ADMIN — PRAMIPEXOLE DIHYDROCHLORIDE 0.25 MG: 0.25 TABLET ORAL at 17:41

## 2024-04-24 RX ADMIN — ENOXAPARIN SODIUM 40 MG: 40 INJECTION SUBCUTANEOUS at 08:21

## 2024-04-24 RX ADMIN — LORAZEPAM 0.5 MG: 2 INJECTION INTRAMUSCULAR; INTRAVENOUS at 04:31

## 2024-04-24 RX ADMIN — SENNOSIDES 8.6 MG: 8.6 TABLET, FILM COATED ORAL at 21:37

## 2024-04-24 RX ADMIN — DEXAMETHASONE 4 MG: 4 TABLET ORAL at 08:21

## 2024-04-24 RX ADMIN — ATORVASTATIN CALCIUM 40 MG: 40 TABLET, FILM COATED ORAL at 17:41

## 2024-04-24 RX ADMIN — POLYETHYLENE GLYCOL 3350 17 G: 17 POWDER, FOR SOLUTION ORAL at 09:27

## 2024-04-24 RX ADMIN — LEVETIRACETAM 2000 MG: 750 TABLET, FILM COATED ORAL at 20:20

## 2024-04-24 RX ADMIN — PRAMIPEXOLE DIHYDROCHLORIDE 0.25 MG: 0.25 TABLET ORAL at 21:37

## 2024-04-24 RX ADMIN — CLOBAZAM 5 MG: 10 TABLET ORAL at 18:01

## 2024-04-24 RX ADMIN — POLYETHYLENE GLYCOL 3350 17 G: 17 POWDER, FOR SOLUTION ORAL at 21:37

## 2024-04-24 RX ADMIN — LACOSAMIDE 200 MG: 10 INJECTION, SOLUTION INTRAVENOUS at 01:55

## 2024-04-24 RX ADMIN — PANTOPRAZOLE SODIUM 40 MG: 40 TABLET, DELAYED RELEASE ORAL at 05:44

## 2024-04-24 NOTE — PLAN OF CARE
Problem: NEUROSENSORY - ADULT  Goal: Achieves stable or improved neurological status  Description: INTERVENTIONS  - Monitor and report changes in neurological status  - Monitor vital signs such as temperature, blood pressure, glucose, and any other labs ordered   - Initiate measures to prevent increased intracranial pressure  - Monitor for seizure activity and implement precautions if appropriate      Outcome: Progressing  Goal: Remains free of injury related to seizures activity  Description: INTERVENTIONS  - Maintain airway, patient safety  and administer oxygen as ordered  - Monitor patient for seizure activity, document and report duration and description of seizure to physician/advanced practitioner  - If seizure occurs,  ensure patient safety during seizure  - Reorient patient post seizure  - Seizure pads on all 4 side rails  - Instruct patient/family to notify RN of any seizure activity including if an aura is experienced  - Instruct patient/family to call for assistance with activity based on nursing assessment  - Administer anti-seizure medications if ordered    Outcome: Progressing

## 2024-04-24 NOTE — UTILIZATION REVIEW
NOTIFICATION OF INPATIENT ADMISSION   AUTHORIZATION REQUEST   SERVICING FACILITY:   [facility]  Address: [address] [place] [address]  Tax ID: [id number]  NPI: [id number] ATTENDING PROVIDER:  Attending Name and NPI#: Elaine Yadav Do [[id number]]  Address: [address] [place] [address]  Phone: [phone]   ADMISSION INFORMATION:  Place of Service: Inpatient [facility]  Place of Service Code: 21  Inpatient Admission Date/Time: 4/22/24  6:46 PM  Discharge Date/Time: No discharge date for patient encounter.  Admitting Diagnosis Code/Description:  Tremors of nervous system [R25.1]     UTILIZATION REVIEW CONTACT:  Gerardo Vargas Utilization   Network Utilization Review Department  Phone: [phone]  Fax: [phone]  Email: [email or web address]  Contact for approvals/pending authorizations, clinical reviews, and discharge.     PHYSICIAN ADVISORY SERVICES:  Medical Necessity Denial & [id number] Review  Phone: [phone]  Fax: [phone]  Email: [email or web address]     DISCHARGE SUPPORT TEAM:  For Patients Discharge Needs & Updates  Phone: [phone] opt. 2 Fax: [phone]  Email: [email or web address]

## 2024-04-24 NOTE — PROGRESS NOTES
NEUROLOGY RESIDENCY PROGRESS NOTE   Name: Paras Pitts   Age & Sex: 68 y.o. male   MRN: [id number]  Unit/Bed#: [facility] 721-01   Encounter: [id number]    ASSESSMENT & PLAN   Seizure-like activity (HCC)  Assessment & Plan  Patient is a 68 year old male who presented to the ED for continuous movements of his LUE and LLE. Patient has a PMH of HTN, hypercholesterolemia, metastatic adenocarcinoma, CVA, tremors, and weakness. Upon evaluation at bedside, patient stated that the double vision is very far away. He stated that he saw double cars en route to [facility]. Then, he stated that he was seeing 4 light bulbs in the parking lot through the window but there was a reflection which made the 2 light bulbs seem to be 4. At this time, the LUE movements have resolved but the LLE continues to have rhythmic movements.      Impression: Patient's movements seem to be consistent with the EPC in the setting of metastatic adenocarcinoma.     Plan:  Discontinue vEEG  Continue Keppra 2 g twice daily  Continue Vimpat 100 mg twice daily  Discontinue Ativan 0.5 mg every 6 scheduled  Will start Onfi 5 mg BID  Recommend increasing the dexamethasone from 1 mg to 4 mg BID as per radiation oncology notes  Rest of care as per primary team         SUBJECTIVE   Patient was seem and examined this morning at bedside. No acute events over night.     Review of Systems   Constitutional:  Negative for chills, fatigue, fever and unexpected weight change.   HENT:  Negative for drooling, hearing loss, sinus pressure, sinus pain, tinnitus, trouble swallowing and voice change.    Eyes:  Negative for photophobia and visual disturbance.   Respiratory:  Negative for chest tightness and shortness of breath.    Cardiovascular:  Negative for chest pain, palpitations and leg swelling.   Gastrointestinal:  Negative for constipation, diarrhea and nausea.   Endocrine: Negative for cold intolerance.   Genitourinary:  Negative for difficulty  urinating.   Musculoskeletal:  Negative for arthralgias, back pain, gait problem, myalgias, neck pain and neck stiffness.   Skin:  Negative for pallor and rash.   Neurological:  Positive for seizures. Negative for dizziness, tremors, syncope, facial asymmetry, speech difficulty, weakness, light-headedness, numbness and headaches.   Psychiatric/Behavioral:  Negative for behavioral problems, confusion, decreased concentration and sleep disturbance.      OBJECTIVE   Patient ID: Paras Pitts is a 68 y.o. male.  Vitals:    24 0821 24 0824 24 1107 24 1522   BP: 147/92 147/92 134/79 137/82   BP Location:   Right arm    Pulse:  88 81 79   Resp:   18 18   Temp:   (!) 97.4 °F (36.3 °C) 98 °F (36.7 °C)   TempSrc:   Oral    SpO2:  94% 94% 97%        Temperature:   Temp (24hrs), Av.6 °F (36.4 °C), Min:97.3 °F (36.3 °C), Max:98 °F (36.7 °C)    Temperature: 98 °F (36.7 °C)    GENERAL EXAM:  Constitutional:Alert. Not in acute distress. Not ill-appearing, toxic-appearing or diaphoretic.   HENT: Normocephalic and atraumatic. Nose and Ears normal.   Eyes: No scleral icterus. No discharge.   Neck: Neck Supple. ROM normal.  Cardiovascular: Distal extremities warm without palpable edema or tenderness, no observed significant swelling.   Pulmonary: Pulmonary effort is normal. Not in respiratory distress  Abdominal: Abdomen is flat and not distended  Musculoskeletal: No swelling or deformity.  Skin: Warm and dry  Psychiatric: Normal behavior and appropriate affect     NEUROLOGIC  EXAM:  Mental Status: alertness: alert, orientation: time, date, person, place, city, president, speech:fluent, affect: normal, thought content exhibits logical connections  Cranial Nerves:  II: Pupils equal, round, reactive to light and accommodation, Visual Fields normal  III, IV, VI: EOM full and intact  V: facial sensation was normal and symmetrical  VII: Facial symmetry:facial symmetry equal  VIII: normal hearing to speech  IX, X:  normal palatal elevation, no uvular deviation  XI: 5/5 head turn and 5/5 shoulder shrug bilaterally  XII: midline tongue protrusion  Motor: Normal bulk, tone, no involuntary movements or tremors     DELTOID   BICEP   TRICEPS   WRIST  EXTENSION   WRIST  FLEXION      RIGHT 5 5 5 5 5 5   LEFT 2 2 2 3 3 3      HIP  FLEXION KNEE  EXTENSION DORSI   PLANTAR     RIGHT 5 5 5 5   LEFT 1 0 0 0   Reflexes: No clonus, no Meier's, no cross abductors, toes down     BICEP   TRICEPS   BRACHIO   PATELLAR   ACHILLES   RIGHT 2+ 2+ 2+ 2+ 2+   LEFT 2+ 2+ 2+ 2+ 2+   Sensory:Normal light touch sensation  Station/Gait: Deffered  LABORATORY DATA   Labs: I have personally reviewed pertinent reports.    Results from last 7 days   Lab Units 04/23/24 0528 04/22/24 0503 04/21/24 0426 04/20/24  1345   WBC Thousand/uL 7.71 7.34 7.92 11.63*   HEMOGLOBIN g/dL 13.6 13.9 13.6 15.3   HEMATOCRIT % 41.2 42.3 40.5 46.3   PLATELETS Thousands/uL 137* 128* 124* 166   SEGS PCT % 62 65  --   --    MONO PCT % 7 6  --  5   EOS PCT % 3 2  --  1      Results from last 7 days   Lab Units 04/24/24 0456 04/23/24 0528 04/22/24 0503 04/21/24 0426 04/20/24  1345   SODIUM mmol/L 140 138 139   < > 141   POTASSIUM mmol/L 3.9 3.7 3.7   < > 4.0   CHLORIDE mmol/L 102 102 104   < > 102   CO2 mmol/L 28 27 28   < > 23   BUN mg/dL 14 17 13   < > 16   CREATININE mg/dL 0.72 0.78 0.80   < > 0.95   CALCIUM mg/dL 9.0 8.8 9.0   < > 9.7   ALK PHOS U/L  --   --   --   --  48   ALT U/L  --   --   --   --  27   AST U/L  --   --   --   --  25    < > = values in this interval not displayed.     Results from last 7 days   Lab Units 04/20/24  1345   MAGNESIUM mg/dL 1.9     Results from last 7 days   Lab Units 04/20/24  1345   PHOSPHORUS mg/dL 3.5      Results from last 7 days   Lab Units 04/20/24  1345   INR  0.92   PTT seconds 22*             IMAGING & DIAGNOSTIC TESTING   Radiology Results: I have personally reviewed pertinent reports.      No orders to display       Other  Diagnostic Testing: I have personally reviewed pertinent reports.    ACTIVE MEDICATIONS     Current Facility-Administered Medications   Medication Dose Route Frequency    acetaminophen (TYLENOL) tablet 975 mg  975 mg Oral Q8H PRN    aluminum-magnesium hydroxide-simethicone (MAALOX) oral suspension 30 mL  30 mL Oral Q6H PRN    amLODIPine (NORVASC) tablet 10 mg  10 mg Oral Daily    atorvastatin (LIPITOR) tablet 40 mg  40 mg Oral After Dinner    bisacodyl (DULCOLAX) rectal suppository 10 mg  10 mg Rectal Daily PRN    cloBAZam (ONFI) tablet 5 mg  5 mg Oral BID    dexamethasone (DECADRON) tablet 4 mg  4 mg Oral Daily    enoxaparin (LOVENOX) subcutaneous injection 40 mg  40 mg Subcutaneous Daily    lacosamide (VIMPAT) tablet 200 mg  200 mg Oral Q12H LEN    levETIRAcetam (KEPPRA) tablet 2,000 mg  2,000 mg Oral Q12H LEN    losartan (COZAAR) tablet 50 mg  50 mg Oral Daily    ondansetron (ZOFRAN-ODT) dispersible tablet 4 mg  4 mg Oral Q6H PRN    pantoprazole (PROTONIX) EC tablet 40 mg  40 mg Oral Early Morning    polyethylene glycol (MIRALAX) packet 17 g  17 g Oral BID    pramipexole (MIRAPEX) tablet 0.25 mg  0.25 mg Oral TID    senna (SENOKOT) tablet 8.6 mg  1 tablet Oral HS       Prior to Admission medications    Medication Sig Start Date End Date Taking? Authorizing Provider   amLODIPine (NORVASC) 10 mg tablet Take 1 tablet (10 mg total) by mouth daily 2/27/24   Maikel Valencia DO   atorvastatin (LIPITOR) 40 mg tablet Take 1 tablet (40 mg total) by mouth daily after dinner 4/2/24 5/2/24  aMikel Valencia DO   dexamethasone (DECADRON) 2 mg tablet Take 1 tablet (2 mg total) by mouth 2 (two) times a day with meals  Patient taking differently: Take 1 mg by mouth daily with breakfast 3/22/24   James Contreras MD   gabapentin (Neurontin) 300 mg capsule Take 1 capsule (300 mg total) by mouth 3 (three) times a day 12/13/23 4/10/24  Shante Connelly PA-C   hydroCHLOROthiazide 25 mg tablet Take 25 mg by  mouth daily  Patient not taking: Reported on 4/20/2024 3/14/24   Historical Provider, MD   losartan (COZAAR) 50 mg tablet Take 1 tablet (50 mg total) by mouth daily 4/2/24   Maikel Valencia DO   pantoprazole (PROTONIX) 40 mg tablet Take 1 tablet (40 mg total) by mouth daily 4/10/24   James Contreras MD       VTE Mechanical Prophylaxis: sequential compression device     ==  Jerson Bhakta DO  [facility] Neurology Residency, PGY-2

## 2024-04-24 NOTE — PROGRESS NOTES
PROGRESS NOTE - Family Medicine Residency Elif Crain Hong 1955, 68 y.o. male.  MRN: [id number]    Unit/Bed#: [facility] 721-01 Encounter: [id number]  Primary Care Provider: Maikel Brower DO      Admission Date: 4/22/2024  Length of Stay: 2 days  Code Status:  Prior  Diet: Diet Cardiovascular; Cardiac  Consult:   IP CONSULT TO NEUROLOGY  IP CONSULT TO CASE MANAGEMENT      Assessment & Plan:     Discussed with [facility] team and finalization is pending attending physician attestation.    * Tremors of nervous system  Assessment & Plan  Patient was originally admitted to the [facility] on 4/20/2024 after acute onset tremors of the right upper and lower extremity.  He does have a history of stroke with left-sided weakness and his new tremors were thought to be related to his previous neurologic injury.  Initial stroke workup was negative and neurology was consulted.  Imaging was obtained including MRI that showed no additional evidence of stroke/TIA. Neurology then began an evaluation for seizure like activity in this patient because of his history of brain metastasis and surgical resection of brain mets.  He was loaded with Keppra and was admitted for close monitoring.  Despite his initial AED dosing the patient's tremors continued.  At this point neurology increased his AED medication regimen with the addition of Vimpat to his twice daily Keppra. He was loaded with 200 mg Vimpat and will continue 100 mg twice daily. Neurology also recommended transfer to [facility] for video EEG monitoring and further titration of antiepileptic drugs.    Assessment: New onset partial seizures, concern for status     Plan:  - Neurology consult on arrival; per neurology will plan to reduce ativan starting this morning pending clinical course.   - Video EEG ordered on arrival  - Continue Keppra 2 g twice daily  - Continue Vimpat 200 mg twice daily  - Neurochecks every 4  - Close hemodynamic  monitoring      Generalized weakness  Assessment & Plan  Patient presenting with generalized weakness in addition to his left-sided tremors. LUE paralysis at b/l LLE near paralysis.    Assessment: weakness likely multifactorial in the setting of new onset seizures (postictal?) and metastatic lung cancer receiving chemotherapy and radiation    Plan:  - PT / OT consult  - Case management consult    History of CVA (cerebrovascular accident)  Assessment & Plan  Patient has a history of stroke and has left-sided weakness as a long-term sequelae.    Assessment: History of CVA with residual left-sided weakness    Plan:  - Continue statin    Carcinoma of right lung (HCC)  Assessment & Plan  Patient was diagnosed with adenocarcinoma of the right lung in 2023; stage Stage JAY (cT3, cN1, cM1b)   He is s/p robotic converted to right Thoracotomy and right upper lobectomy on 9/1/2023.  He has also undergone bronchoscopy with EBUS at [facility] on 4/16/2024.  Initial pathology showing metastatic lung cell carcinoma in the most recent biopsies.  He follows with [facility] hematology oncology for ongoing cancer management and chemotherapy.    Assessment: Right lung adenocarcinoma with metastasis to brain and mediastinal lymph nodes    Plan:  - Neurology to weigh in on inpatient heme-onc consult  - If no inpatient consult needed, continue outpatient treatment      Metastatic adenocarcinoma (HCC)  Assessment & Plan  History of metastatic lung adenocarcinoma w/ mets to brain. Most recent staging: Stage JAY (cT3, cN1, cM1b)  He has undergone multiple rounds of chemotherapy and radiation and has undergone surgical resection for the metastatic brain lesion.  A PET scan on 3/25/2024 showed uptake in mediastinal lymph nodes compatible with metastasis.  Repeat MRI brain during current hospitalization showed stable postsurgical changes and lack of new lesions.  He has been taking Decadron at home for vasogenic edema of the brain.    Patient now  presenting with new seizure activity.  He has been evaluated at the [facility] by our neurology colleagues.  He has been started on antiepileptic drugs but has continued to have breakthrough seizures.  He is being transferred to [facility] for video EEG monitoring and additional AED titration.    Assessment: Metastatic adenocarcinoma of the lung with metastasis to the brain s/p surgical resection and SRS on daily Decadron    Plan:  - Continue Decadron at 4 mg/day  - Neurology will weigh in on oncology consultation while hospitalized. On initial evaluation no immediate need for oncology recommendations      Hypercholesterolemia  Assessment & Plan  Continue home statin    Essential hypertension  Assessment & Plan  Patient has known history of hypertension, no issues with blood pressure prior to transfer.  Home regimen: Amlodipine, losartan    Assessment: Hypertension, well-controlled    Plan:  - Continue amlodipine and losartan        Principal Problem:    Tremors of nervous system  Active Problems:    Essential hypertension    Hypercholesterolemia    Metastatic adenocarcinoma (HCC)    Carcinoma of right lung (HCC)    History of CVA (cerebrovascular accident)    Generalized weakness    Seizure-like activity (HCC)      VTE Pharm PPX: Enoxaparin (Lovenox)  VTE Mech PPX: sequential compression device and/or foot pump applied unless otherwise contraindicated    Subjective     Hospital Course & 24hr events:     Hospital course:  68 y.o. male admitted 4/22/2024, now HD# 2, for tremors of nervous system       Overnight/24hr events:  Overnight events: No acute events OVN.  Patient seen and examined at bedside. Patient states he is doing well this AM. He states he has been tolerating PO diet without difficulties. States he has not had a BM since arrival to [facility] but denies abdominal pain or nauseas.     Objective     Vitals:     Vitals:    04/23/24 0744 04/23/24 1118 04/23/24 1457 04/23/24  2209   BP: 144/93 141/91 141/89 140/88   Pulse: 81 81 87 84   Resp: 16 18 18    Temp: (!) 97.4 °F (36.3 °C) 97.9 °F (36.6 °C) 97.8 °F (36.6 °C) (!) 97.3 °F (36.3 °C)   SpO2: 96% 96% 95% 96%     Temp:  [97.3 °F (36.3 °C)-97.9 °F (36.6 °C)] 97.3 °F (36.3 °C)  HR:  [81-87] 84  Resp:  [16-18] 18  BP: (140-144)/(88-93) 140/88  Weight (last 2 days)       None            Intake/Output Summary (Last 24 hours) at 4/24/2024 0557  Last data filed at 4/23/2024 1725  Gross per 24 hour   Intake 767 ml   Output 400 ml   Net 367 ml     Invasive Devices       Central Venous Catheter Line  Duration             Port A Cath 04/27/23 Right Subclavian 362 days              Peripheral Intravenous Line  Duration             Peripheral IV 04/21/24 Distal;Right;Upper;Ventral (anterior) Arm 2 days                      Labs:    I have personally reviewed pertinent reports.      Results from last 7 days   Lab Units 04/23/24  0528 04/22/24  0503 04/21/24  0426 04/20/24  1345   WBC Thousand/uL 7.71 7.34 7.92 11.63*   HEMOGLOBIN g/dL 13.6 13.9 13.6 15.3   HEMATOCRIT % 41.2 42.3 40.5 46.3   PLATELETS Thousands/uL 137* 128* 124* 166   TOTAL NEUT ABS Thousands/µL 4.68 4.76  --   --        Results from last 7 days   Lab Units 04/23/24  0528 04/22/24  0503 04/21/24  0426 04/20/24  1345   POTASSIUM mmol/L 3.7 3.7 3.4* 4.0   CHLORIDE mmol/L 102 104 105 102   CO2 mmol/L 27 28 28 23   BUN mg/dL 17 13 12 16   CREATININE mg/dL 0.78 0.80 0.76 0.95   CALCIUM mg/dL 8.8 9.0 9.0 9.7   AST U/L  --   --   --  25   ALT U/L  --   --   --  27   ALK PHOS U/L  --   --   --  48   EGFR ml/min/1.73sq m 91 90 92 80   MAGNESIUM mg/dL  --   --   --  1.9   PHOSPHORUS mg/dL  --   --   --  3.5                    EKG, Pathology, Imaging, and Other Studies:   I have personally reviewed pertinent reports.      No results found.      Meds/Allergies     All medications and allergies reviewed  No Known Allergies    Continuous Infusions:       Scheduled Meds:  Current  Facility-Administered Medications   Medication Dose Route Frequency Provider Last Rate    acetaminophen  975 mg Oral Q8H PRN George Burnett MD      aluminum-magnesium hydroxide-simethicone  30 mL Oral Q6H PRN George Burnett MD      amLODIPine  10 mg Oral Daily George Burnett MD      atorvastatin  40 mg Oral After Dinner George Burnett MD      dexamethasone  4 mg Oral Daily Abhijeet Babcock MD      enoxaparin  40 mg Subcutaneous Daily George Burnett MD      lacosamide  200 mg Intravenous Q12H Jerson Bhakta DO      levETIRAcetam  2,000 mg Intravenous Q12H [facility] George Burnett MD      LORazepam  0.5 mg Intravenous Q6H Abhijeet Babcock MD      losartan  50 mg Oral Daily George Burnett MD      ondansetron  4 mg Intravenous Q6H PRN George Burnett MD      pantoprazole  40 mg Oral Early Morning George Burnett MD      polyethylene glycol  17 g Oral Daily Shea Alcantar MD      pramipexole  0.25 mg Oral TID George Burnett MD         PRN Meds:    acetaminophen    aluminum-magnesium hydroxide-simethicone    ondansetron      Physical Exam   Physical Exam  Constitutional:       General: He is not in acute distress.     Appearance: Normal appearance.   HENT:      Head: Normocephalic and atraumatic.   Cardiovascular:      Rate and Rhythm: Normal rate and regular rhythm.      Pulses: Normal pulses.      Heart sounds: No murmur heard.  Pulmonary:      Effort: Pulmonary effort is normal. No respiratory distress.      Breath sounds: Normal breath sounds.   Abdominal:      General: Bowel sounds are normal. There is distension.      Tenderness: There is no abdominal tenderness. There is no guarding.   Musculoskeletal:         General: No swelling or tenderness.      Cervical back: Normal range of motion.   Skin:     General: Skin is warm.   Neurological:      Mental Status: He is alert and oriented to person, place, and time.       Sensory: No sensory deficit.      Motor: Weakness present.      Comments: 5/5 strength RUE and RLE. +1 strength LUE and LLE. Able to  fingers of left hand                Evonne Santiago MD  PGY-2, B Family Medicine  04/24/24, 5:57 AM

## 2024-04-24 NOTE — CASE MANAGEMENT
Case Management Assessment & Discharge Planning Note    Patient name Paras Pitts  Location [facility] 721/[facility] 721-01 MRN [id number]  : 1955 Date 2024       Current Admission Date: 2024  Current Admission Diagnosis:Tremors of nervous system   Patient Active Problem List    Diagnosis Date Noted    Seizure-like activity (HCC) 2024    Generalized weakness 2024    Tremors of nervous system 2024    History of CVA (cerebrovascular accident) 2023    Chemotherapy-induced neutropenia (HCC) 2023    Encounter for central line care 2023    Carcinoma of right lung (HCC) 2023    Lung nodule 2023    Metastatic adenocarcinoma (HCC)     Hypercholesterolemia 2021    Essential hypertension 2020    Annual physical exam 2020    Negative depression screening 2020    Overweight (BMI 25.0-29.9) 2020      LOS (days): 2  Geometric Mean LOS (GMLOS) (days):   Days to GMLOS:     OBJECTIVE:    Risk of Unplanned Readmission Score: 14.9         Current admission status: Inpatient       Preferred Pharmacy:   [facility] [place] [id number] [place] [address]  Phone: [phone] Fax: [phone]    Primary Care Provider: Maikel Brower DO    Primary Insurance: BLUE CROSS  Secondary Insurance: MEDICARE    ASSESSMENT:  Active Health Care Proxies       [facility] Care Representative - Spouse   Primary Phone: [phone] (Mobile)                      Patient Information  Mental Status: Alert         DISCHARGE DETAILS:         Additional Comments: Patient was transfered from [facility] and CM open was completed on 2024 at [facility] for this assessment.  CM to be available fir DC planning needs.

## 2024-04-24 NOTE — CASE MANAGEMENT
Case Management Discharge Planning Note    Patient name Paras Pitts  Location [facility] 721/[facility] 721-01 MRN [id number]  : 1955 Date 2024       Current Admission Date: 2024  Current Admission Diagnosis:Tremors of nervous system   Patient Active Problem List    Diagnosis Date Noted    Seizure-like activity (HCC) 2024    Generalized weakness 2024    Tremors of nervous system 2024    History of CVA (cerebrovascular accident) 2023    Chemotherapy-induced neutropenia (HCC) 2023    Encounter for central line care 2023    Carcinoma of right lung (HCC) 2023    Lung nodule 2023    Metastatic adenocarcinoma (HCC)     Hypercholesterolemia 2021    Essential hypertension 2020    Annual physical exam 2020    Negative depression screening 2020    Overweight (BMI 25.0-29.9) 2020      LOS (days): 2  Geometric Mean LOS (GMLOS) (days):   Days to GMLOS:     OBJECTIVE:  Risk of Unplanned Readmission Score: 14.93         Current admission status: Inpatient   Preferred Pharmacy:   [facility] - [place] [facility] [address]  [place] [address]  Phone: [phone] Fax: [phone]    Primary Care Provider: Maikel Brower DO    Primary Insurance: BLUE CROSS  Secondary Insurance: MEDICARE    DISCHARGE DETAILS:    Discharge planning discussed with:: patient and spouse at bedside       Other Referral/Resources/Interventions Provided:  Interventions: Acute Rehab, Short Term Rehab         Additional Comments: During multidisciplinary rounds it was determined ARC is recommended for DC needs.  Patient and wife state patient was at [facility] last year and would like rehab there again if possible.  Patient and wife were made aware if  ARC is not approved by insurance then STR can be a backup plan.  patietn and wife are in agreement to STR as a backup plan.  ARC and STR referrals made via  [facility] with [facility] for DC needs.  Awaiting determination.  CM to be available

## 2024-04-24 NOTE — QUICK NOTE
Updated by EMU attending Dr. Guillen:  Today, he had 2 right frontocentral electrographic seizures in sleep without clinical correlate during. One at 13:12 and the other at 19:21. Extra lacosamide was given just after the 13:12 seizure.  Patient wife pushed button for double vision around 10:48 PM. No eeg change.     Upon receiving the event TT, I evaluated the patient at bedside. Patient was having binocular double vision which was horizontal. No double vision with monocular vision. No sustained nystagmus appreciated. Rest of the exam was consistent with my exam from yesterday.     Impression: Patient might be having side effects from Lacosamide but unsure at this time given that the symptoms precipitated after the administration of the Keppra and Ativan.      Plan:  Continue to monitor the patient for 1-2 more doses of the medications  Will let day team decide appropriate management of the AEDs  Patient advised to press the EMU button if he has further episodes  Rest of care as per primary team

## 2024-04-25 PROBLEM — K59.01 CONSTIPATION BY DELAYED COLONIC TRANSIT: Status: ACTIVE | Noted: 2024-04-25

## 2024-04-25 PROCEDURE — 97112 NEUROMUSCULAR REEDUCATION: CPT

## 2024-04-25 PROCEDURE — 99232 SBSQ HOSP IP/OBS MODERATE 35: CPT | Performed by: FAMILY MEDICINE

## 2024-04-25 PROCEDURE — 99233 SBSQ HOSP IP/OBS HIGH 50: CPT | Performed by: PSYCHIATRY & NEUROLOGY

## 2024-04-25 PROCEDURE — 97530 THERAPEUTIC ACTIVITIES: CPT

## 2024-04-25 RX ADMIN — ENOXAPARIN SODIUM 40 MG: 40 INJECTION SUBCUTANEOUS at 09:00

## 2024-04-25 RX ADMIN — LACOSAMIDE 200 MG: 200 TABLET, FILM COATED ORAL at 09:02

## 2024-04-25 RX ADMIN — PANTOPRAZOLE SODIUM 40 MG: 40 TABLET, DELAYED RELEASE ORAL at 05:13

## 2024-04-25 RX ADMIN — PRAMIPEXOLE DIHYDROCHLORIDE 0.25 MG: 0.25 TABLET ORAL at 09:02

## 2024-04-25 RX ADMIN — LEVETIRACETAM 2000 MG: 750 TABLET, FILM COATED ORAL at 20:26

## 2024-04-25 RX ADMIN — LEVETIRACETAM 2000 MG: 750 TABLET, FILM COATED ORAL at 09:00

## 2024-04-25 RX ADMIN — ATORVASTATIN CALCIUM 40 MG: 40 TABLET, FILM COATED ORAL at 18:29

## 2024-04-25 RX ADMIN — LACOSAMIDE 200 MG: 200 TABLET, FILM COATED ORAL at 20:26

## 2024-04-25 RX ADMIN — BISACODYL 10 MG: 10 SUPPOSITORY RECTAL at 09:00

## 2024-04-25 RX ADMIN — PRAMIPEXOLE DIHYDROCHLORIDE 0.25 MG: 0.25 TABLET ORAL at 20:26

## 2024-04-25 RX ADMIN — POLYETHYLENE GLYCOL 3350 17 G: 17 POWDER, FOR SOLUTION ORAL at 09:00

## 2024-04-25 RX ADMIN — DEXAMETHASONE 4 MG: 4 TABLET ORAL at 09:02

## 2024-04-25 RX ADMIN — CLOBAZAM 5 MG: 10 TABLET ORAL at 09:08

## 2024-04-25 RX ADMIN — AMLODIPINE BESYLATE 10 MG: 10 TABLET ORAL at 09:02

## 2024-04-25 RX ADMIN — PRAMIPEXOLE DIHYDROCHLORIDE 0.25 MG: 0.25 TABLET ORAL at 15:42

## 2024-04-25 RX ADMIN — CLOBAZAM 5 MG: 10 TABLET ORAL at 18:29

## 2024-04-25 RX ADMIN — ACETAMINOPHEN 975 MG: 325 TABLET, FILM COATED ORAL at 15:42

## 2024-04-25 RX ADMIN — LOSARTAN POTASSIUM 50 MG: 50 TABLET, FILM COATED ORAL at 09:02

## 2024-04-25 NOTE — ASSESSMENT & PLAN NOTE
History of metastatic lung adenocarcinoma w/ mets to brain. Most recent staging: Stage JAY (cT3, cN1, cM1b)  He has undergone multiple rounds of chemotherapy and radiation and has undergone surgical resection for the metastatic brain lesion.  A PET scan on 3/25/2024 showed uptake in mediastinal lymph nodes compatible with metastasis.  Repeat MRI brain during current hospitalization showed stable postsurgical changes and lack of new lesions.  He has been taking Decadron at home for vasogenic edema of the brain.    Patient now presenting with new seizure activity.  He has been evaluated at the [facility] by our neurology colleagues.  He has been started on antiepileptic drugs but has continued to have breakthrough seizures.  He is being transferred to [facility] for video EEG monitoring and additional AED titration.    Assessment: Metastatic adenocarcinoma of the lung with metastasis to the brain s/p surgical resection and SRS on daily Decadron    Plan:  - Continue Decadron at 4 mg/day  - Patient will need continued outpatient radiation treatment for his metastatic lung cancer.  We have discussed the plan with his outpatient radiation oncologist, Dr. Contreras.  Dr. Contreras would like Eugene to get his planned CT-SIM as soon as possible.  Additional radiation therapy plans require 1 to 2 weeks of planning after the study is completed.  Unfortunately, the patient cannot be admitted to [facility] and then sent to [facility] to complete the CT-SIM.  Therefore, we are having PT/OT reevaluate the patient for additional information to provide [facility] as his final acceptance has yet to be determined.  - We discussed the option of being discharged so that Eugene can continue his radiation planning/treatments with Dr. Contreras and receiving outpatient physical therapy/home physical therapy.  Eugene was agreeable to this plan if necessary.  - Follow-up PT/OT evaluation

## 2024-04-25 NOTE — PHYSICAL THERAPY NOTE
Physical Therapy Progress Note     04/25/24 0955   PT Last Visit   PT Visit Date 04/25/24   Note Type   Note Type Treatment   Pain Assessment   Pain Assessment Tool 0-10   Pain Score 2   Pain Location/Orientation Location: Head   Hospital Pain Intervention(s) Repositioned;Emotional support   Restrictions/Precautions   Other Precautions Cognitive;Chair Alarm;Bed Alarm;Fall Risk;Pain  (Alarm active post session.)   Subjective   Subjective The patient notes improving motor control in his LUE and slight improvement in his LLE.   Bed Mobility   Supine to Sit 3  Moderate assistance   Additional items Assist x 2;Increased time required;Verbal cues;LE management;HOB elevated   Sit to Supine 3  Moderate assistance   Additional items Assist x 2;Increased time required;Verbal cues;LE management   Transfers   Sit to Stand 3  Moderate assistance   Additional items Assist x 2;Increased time required;Verbal cues   Stand to Sit 3  Moderate assistance   Additional items Assist x 2;Increased time required;Verbal cues   Balance   Static Sitting Poor +   Dynamic Sitting Poor   Static Standing Poor -   Dynamic Standing Poor -   Activity Tolerance   Activity Tolerance Patient tolerated treatment well;Patient limited by fatigue   Nurse Made Aware Yes.   Exercises   Knee AROM Long Arc Quad Sitting;10 reps;Bilateral;AAROM   THR Supine;Left;AAROM;5 reps   Assessment   Prognosis Fair   Problem List Decreased strength;Decreased mobility;Impaired balance;Decreased endurance;Decreased cognition;Impaired judgement;Decreased safety awareness   Assessment Improved motor and strength is evident today, but his LLE remains notably limited from his baseline. The patient was able to perform therapeutic exercises with assistance vs. gravity-minimized position. Activity is limited due to fatigue, but he recovers with rests. The patient unfortunately had no available chair to get out of bed to, and one was not able to be procured until late in the  afternoon. Will continue to follow in order to facilitate his recovery.   Barriers to Discharge Inaccessible home environment;Decreased caregiver support   Goals   Patient Goals To keep getting stronger, and better.   STG Expiration Date 05/07/24   PT Treatment Day 1   Plan   Treatment/Interventions Functional transfer training;LE strengthening/ROM;Therapeutic exercise;Endurance training;Patient/family training;Bed mobility;Elevations;Gait training   Progress Progressing toward goals   PT Frequency 3-5x/wk   Discharge Recommendation   Rehab Resource Intensity Level, PT I (Maximum Resource Intensity)   AM-PAC Basic Mobility Inpatient   Turning in Flat Bed Without Bedrails 3   Lying on Back to Sitting on Edge of Flat Bed Without Bedrails 2   Moving Bed to Chair 2   Standing Up From Chair Using Arms 2   Walk in Room 1   Climb 3-5 Stairs With Railing 1   Basic Mobility Inpatient Raw Score 11   Basic Mobility Standardized Score 30.25   [facility] Highest Level Of Mobility   -HL Goal 4: Move to chair/commode   -HLM Achieved 5: Stand (1 or more minutes)         An AM-PAC Basic Mobility raw score less than 16 suggests the patient may benefit from discharge to post-acute rehab services.    Victor Hugo Lew, PTA

## 2024-04-25 NOTE — PLAN OF CARE
Problem: PHYSICAL THERAPY ADULT  Goal: Performs mobility at highest level of function for planned discharge setting.  See evaluation for individualized goals.  Description: Treatment/Interventions: Functional transfer training, LE strengthening/ROM, Therapeutic exercise, Endurance training, Cognitive reorientation, Patient/family training, Equipment eval/education, Bed mobility, Gait training, Spoke to nursing, Spoke to case management, OT          See flowsheet documentation for full assessment, interventions and recommendations.  Outcome: Progressing  Note: Prognosis: Fair  Problem List: Decreased strength, Decreased mobility, Impaired balance, Decreased endurance, Decreased cognition, Impaired judgement, Decreased safety awareness  Assessment: Improved motor and strength is evident today, but his LLE remains notably limited from his baseline. The patient was able to perform therapeutic exercises with assistance vs. gravity-minimized position. Activity is limited due to fatigue, but he recovers with rests. The patient unfortunately had no available chair to get out of bed to, and one was not able to be procured until late in the afternoon. Will continue to follow in order to facilitate his recovery.  Barriers to Discharge: Inaccessible home environment, Decreased caregiver support     Rehab Resource Intensity Level, PT: I (Maximum Resource Intensity)    See flowsheet documentation for full assessment.

## 2024-04-25 NOTE — QUICK NOTE
Discussed plan for discharge with patient's outpatient oncologist.  Outpatient oncologist would like patient to obtain CT SIM at [facility] either prior to discharge or during stay at rehab/ARC. Oncology plans on developing treatment plan and initiating chemoRT 1 to 2 weeks after CT SIM.    Discussed with case management and director of [facility].  If patient accepted to [facility], preference would be for patient to have CT SIM completed at [facility] prior to admission to [facility].  Preference would also be for 3 to 4 weeks of uninterrupted therapy.  At this time, delaying chemotherapy start for 3 to 4 weeks is not recommended by oncology.    Shea Alcantar M.D.  [facility] PGY1  4/25/2024, 4:24 PM

## 2024-04-25 NOTE — PROGRESS NOTES
[facility]  Progress Note  Name: Paras Pitts I  MRN: [id number]  Unit/Bed#: [facility] 721-01 I Date of Admission: 4/22/2024   Date of Service: 4/25/2024 I Hospital Day: 3    Assessment/Plan   * Tremors of nervous system  Assessment & Plan  Patient was originally admitted to the [facility] on 4/20/2024 after acute onset tremors of the right upper and lower extremity.  He does have a history of stroke with left-sided weakness and his new tremors were thought to be related to his previous neurologic injury.  Initial stroke workup was negative and neurology was consulted.  Imaging was obtained including MRI that showed no additional evidence of stroke/TIA. Neurology then began an evaluation for seizure like activity in this patient because of his history of brain metastasis and surgical resection of brain mets.  He was loaded with Keppra and was admitted for close monitoring.  Despite his initial AED dosing the patient's tremors continued.  At this point neurology increased his AED medication regimen with the addition of Vimpat to his twice daily Keppra. He was loaded with 200 mg Vimpat and will continue 100 mg twice daily. Neurology also recommended transfer to [facility] for video EEG monitoring and further titration of antiepileptic drugs.    Assessment: New onset partial seizures, concern for status     Plan:  - Neurology consulted.  Will follow up any additional recommendations  - Video EEG discontinued  - Continue Keppra 2 g twice daily  - Continue Vimpat 200 mg twice daily  - Started Onfi on 4/24/24  - Scheduled Ativan discontinued  - Neurochecks every 4  - Close hemodynamic monitoring       Metastatic adenocarcinoma (HCC)  Assessment & Plan  History of metastatic lung adenocarcinoma w/ mets to brain. Most recent staging: Stage JAY (cT3, cN1, cM1b)  He has undergone multiple rounds of chemotherapy and radiation and has undergone surgical resection for the metastatic brain  lesion.  A PET scan on 3/25/2024 showed uptake in mediastinal lymph nodes compatible with metastasis.  Repeat MRI brain during current hospitalization showed stable postsurgical changes and lack of new lesions.  He has been taking Decadron at home for vasogenic edema of the brain.    Patient now presenting with new seizure activity.  He has been evaluated at the [facility] by our neurology colleagues.  He has been started on antiepileptic drugs but has continued to have breakthrough seizures.  He is being transferred to [facility] for video EEG monitoring and additional AED titration.    Assessment: Metastatic adenocarcinoma of the lung with metastasis to the brain s/p surgical resection and SRS on daily Decadron    Plan:  - Continue Decadron at 4 mg/day  - Neurology will weigh in on oncology consultation while hospitalized. On initial evaluation no immediate need for oncology recommendations       Carcinoma of right lung (HCC)  Assessment & Plan  Patient was diagnosed with adenocarcinoma of the right lung in 2023; stage Stage JAY (cT3, cN1, cM1b)   He is s/p robotic converted to right Thoracotomy and right upper lobectomy on 9/1/2023.  He has also undergone bronchoscopy with EBUS at [facility] on 4/16/2024.  Initial pathology showing metastatic lung cell carcinoma in the most recent biopsies.  He follows with [facility] hematology oncology for ongoing cancer management and chemotherapy.    Assessment: Right lung adenocarcinoma with metastasis to brain and mediastinal lymph nodes    Plan:  - Neurology to weigh in on inpatient heme-onc consult  - If no inpatient consult needed, continue outpatient treatment       Constipation by delayed colonic transit  Assessment & Plan  Patient without bowel movement for several days.  This has been an ongoing problem at home as well.  Standard bowel regimen not effective while hospitalized to date.    Assessment: Constipation, likely secondary to prolonged reduced  mobility and recent medication changes.  Small bowel movement this morning, will try suppository.    Plan:  - Continue MiraLAX twice daily  - Continue senna  - Added Dulcolax suppository as needed    Generalized weakness  Assessment & Plan  Patient presenting with generalized weakness in addition to his left-sided tremors. LUE paralysis at b/l LLE near paralysis.    Assessment: weakness likely multifactorial in the setting of new onset seizures (postictal?) and metastatic lung cancer receiving chemotherapy and radiation    Plan:  - PT / OT consult  - Case management consult     History of CVA (cerebrovascular accident)  Assessment & Plan  Patient has a history of stroke and has left-sided weakness as a long-term sequelae.    Assessment: History of CVA with residual left-sided weakness    Plan:  - Continue statin     Hypercholesterolemia  Assessment & Plan  Continue home statin     Essential hypertension  Assessment & Plan  Patient has known history of hypertension, no issues with blood pressure prior to transfer.  Home regimen: Amlodipine, losartan    Assessment: Hypertension, well-controlled    Plan:  - Continue amlodipine and losartan              Subjective:   Patient seen and examined at bedside this morning.  Acute events overnight.  Patient has not had bowel movement since admission but was on bedpan during encounter.  Patient's bowel regimen has been escalated and seems to be working finally.  He no additional complaints this morning.    Objective:     Vitals: Blood pressure 151/92, pulse 73, temperature (!) 97.2 °F (36.2 °C), resp. rate 18, SpO2 95%.,There is no height or weight on file to calculate BMI.      Intake/Output Summary (Last 24 hours) at 4/25/2024 0976  Last data filed at 4/25/2024 0719  Gross per 24 hour   Intake 240 ml   Output 1000 ml   Net -760 ml       Physical Exam:   Physical Exam  Constitutional:       General: He is not in acute distress.     Appearance: Normal appearance.   HENT:       Head: Normocephalic and atraumatic.      Right Ear: External ear normal.      Left Ear: External ear normal.      Nose: Nose normal.      Mouth/Throat:      Mouth: Mucous membranes are moist.   Eyes:      Extraocular Movements: Extraocular movements intact.   Cardiovascular:      Rate and Rhythm: Normal rate and regular rhythm.      Pulses: Normal pulses.      Heart sounds: No murmur heard.  Pulmonary:      Effort: Pulmonary effort is normal. No respiratory distress.      Breath sounds: Normal breath sounds.   Abdominal:      General: Bowel sounds are normal. There is distension.      Tenderness: There is no abdominal tenderness. There is no guarding.   Musculoskeletal:         General: No swelling or tenderness.      Cervical back: Normal range of motion.   Skin:     General: Skin is warm.   Neurological:      Mental Status: He is alert and oriented to person, place, and time.      Sensory: No sensory deficit.      Motor: Weakness present.      Comments:      Psychiatric:         Mood and Affect: Mood normal.         Behavior: Behavior normal.         Invasive Devices       Central Venous Catheter Line  Duration             Port A Cath 04/27/23 Right Subclavian 363 days              Peripheral Intravenous Line  Duration             Peripheral IV 04/25/24 Right;Ventral (anterior) Forearm <1 day                               Lab and other studies:  I have personally reviewed pertinent reports.       No results found for this or any previous visit (from the past 24 hour(s)).    DVT prophylaxis: DVT chemoprophylaxis - sqh or lovenox and SCDs  Diet: Carb controlled  Code Status: FULL CODE - Level 1  Dispo: Medically stable. Dispo pending consultant clearance    Plan discussed with family medicine team.  Final plan pending attending attestation.    George Burnett MD

## 2024-04-25 NOTE — ASSESSMENT & PLAN NOTE
Patient was diagnosed with adenocarcinoma of the right lung in 2023; stage Stage JAY (cT3, cN1, cM1b)   He is s/p robotic converted to right Thoracotomy and right upper lobectomy on 9/1/2023.  He has also undergone bronchoscopy with EBUS at [facility] on 4/16/2024.  Initial pathology showing metastatic lung cell carcinoma in the most recent biopsies.  He follows with [facility] hematology oncology for ongoing cancer management and chemotherapy.    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

## 2024-04-25 NOTE — ARC ADMISSION
Reviewed patient's case with ARC physician - patient may be ARC candidate pending medical stability, functional progress/tolerance, as well as clarification of Oncology plan. Will need further recommendations/plan from them, prior to considering patient for ARC. CM has been updated. Will continue to follow at this time.

## 2024-04-25 NOTE — ASSESSMENT & PLAN NOTE
Patient without bowel movement for several days.  This has been an ongoing problem at home as well.  Standard bowel regimen not effective while hospitalized to date.    Assessment: Constipation, likely secondary to prolonged reduced mobility and recent medication changes.  Small bowel movement this morning, will try suppository.    Plan:  - Continue MiraLAX twice daily  - Continue senna  - Added Dulcolax suppository as needed

## 2024-04-25 NOTE — ASSESSMENT & PLAN NOTE
Patient was originally admitted to the [facility] on 4/20/2024 after acute onset tremors of the right upper and lower extremity.  He does have a history of stroke with left-sided weakness and his new tremors were thought to be related to his previous neurologic injury.  Initial stroke workup was negative and neurology was consulted.  Imaging was obtained including MRI that showed no additional evidence of stroke/TIA. Neurology then began an evaluation for seizure like activity in this patient because of his history of brain metastasis and surgical resection of brain mets.  He was loaded with Keppra and was admitted for close monitoring.  Despite his initial AED dosing the patient's tremors continued.  At this point neurology increased his AED medication regimen with the addition of Vimpat to his twice daily Keppra. He was loaded with 200 mg Vimpat and will continue 100 mg twice daily. Neurology also recommended [facility] for video EEG monitoring and further titration of antiepileptic drugs.    Assessment: New onset partial seizures, concern for status     Plan:  - Neurology consulted, AEDs optimized at this point.  - Video EEG discontinued  - Patient will require 4-week follow-up with epilepsy attending

## 2024-04-26 LAB
ANION GAP SERPL CALCULATED.3IONS-SCNC: 9 MMOL/L (ref 4–13)
BUN SERPL-MCNC: 18 MG/DL (ref 5–25)
CALCIUM SERPL-MCNC: 9.1 MG/DL (ref 8.4–10.2)
CHLORIDE SERPL-SCNC: 101 MMOL/L (ref 96–108)
CO2 SERPL-SCNC: 31 MMOL/L (ref 21–32)
CREAT SERPL-MCNC: 0.83 MG/DL (ref 0.6–1.3)
GFR SERPL CREATININE-BSD FRML MDRD: 89 ML/MIN/1.73SQ M
GLUCOSE SERPL-MCNC: 132 MG/DL (ref 65–140)
POTASSIUM SERPL-SCNC: 4.1 MMOL/L (ref 3.5–5.3)
SODIUM SERPL-SCNC: 141 MMOL/L (ref 135–147)

## 2024-04-26 PROCEDURE — 99233 SBSQ HOSP IP/OBS HIGH 50: CPT | Performed by: FAMILY MEDICINE

## 2024-04-26 PROCEDURE — 80048 BASIC METABOLIC PNL TOTAL CA: CPT

## 2024-04-26 PROCEDURE — 95720 EEG PHY/QHP EA INCR W/VEEG: CPT | Performed by: PSYCHIATRY & NEUROLOGY

## 2024-04-26 RX ORDER — CLONAZEPAM 1 MG/1
TABLET ORAL
Status: DISPENSED
Start: 2024-04-26 | End: 2024-04-27

## 2024-04-26 RX ADMIN — LACOSAMIDE 200 MG: 200 TABLET, FILM COATED ORAL at 21:51

## 2024-04-26 RX ADMIN — LACOSAMIDE 200 MG: 200 TABLET, FILM COATED ORAL at 08:49

## 2024-04-26 RX ADMIN — ENOXAPARIN SODIUM 40 MG: 40 INJECTION SUBCUTANEOUS at 08:50

## 2024-04-26 RX ADMIN — CLOBAZAM 5 MG: 10 TABLET ORAL at 19:44

## 2024-04-26 RX ADMIN — ATORVASTATIN CALCIUM 40 MG: 40 TABLET, FILM COATED ORAL at 17:12

## 2024-04-26 RX ADMIN — PRAMIPEXOLE DIHYDROCHLORIDE 0.25 MG: 0.25 TABLET ORAL at 21:51

## 2024-04-26 RX ADMIN — AMLODIPINE BESYLATE 10 MG: 10 TABLET ORAL at 08:49

## 2024-04-26 RX ADMIN — CLOBAZAM 5 MG: 10 TABLET ORAL at 08:52

## 2024-04-26 RX ADMIN — PANTOPRAZOLE SODIUM 40 MG: 40 TABLET, DELAYED RELEASE ORAL at 06:14

## 2024-04-26 RX ADMIN — PRAMIPEXOLE DIHYDROCHLORIDE 0.25 MG: 0.25 TABLET ORAL at 08:50

## 2024-04-26 RX ADMIN — SENNOSIDES 8.6 MG: 8.6 TABLET, FILM COATED ORAL at 21:51

## 2024-04-26 RX ADMIN — LEVETIRACETAM 2000 MG: 750 TABLET, FILM COATED ORAL at 21:51

## 2024-04-26 RX ADMIN — LEVETIRACETAM 2000 MG: 750 TABLET, FILM COATED ORAL at 08:49

## 2024-04-26 RX ADMIN — PRAMIPEXOLE DIHYDROCHLORIDE 0.25 MG: 0.25 TABLET ORAL at 17:12

## 2024-04-26 RX ADMIN — LOSARTAN POTASSIUM 50 MG: 50 TABLET, FILM COATED ORAL at 08:50

## 2024-04-26 RX ADMIN — DEXAMETHASONE 4 MG: 4 TABLET ORAL at 08:50

## 2024-04-26 NOTE — ASSESSMENT & PLAN NOTE
Patient without bowel movement for several days.  This has been an ongoing problem at home as well.  Standard bowel regimen not effective while hospitalized to date.    Assessment: Constipation, likely secondary to prolonged reduced mobility and recent medication changes.  Small bowel movement this morning, will try suppository.    Patient has had bowel movements 2 days in a row    Plan:  - Continue MiraLAX twice daily  - Continue senna  - Dulcolax suppository as needed

## 2024-04-26 NOTE — PROGRESS NOTES
[facility]  Progress Note  Name: Paras Pitts I  MRN: [id number]  Unit/Bed#: [facility] 732-01 I Date of Admission: 4/22/2024   Date of Service: 4/26/2024 I Hospital Day: 4    Assessment/Plan   * Tremors of nervous system  Assessment & Plan  Patient was originally admitted to the [facility] on 4/20/2024 after acute onset tremors of the right upper and lower extremity.  He does have a history of stroke with left-sided weakness and his new tremors were thought to be related to his previous neurologic injury.  Initial stroke workup was negative and neurology was consulted.  Imaging was obtained including MRI that showed no additional evidence of stroke/TIA. Neurology then began an evaluation for seizure like activity in this patient because of his history of brain metastasis and surgical resection of brain mets.  He was loaded with Keppra and was admitted for close monitoring.  Despite his initial AED dosing the patient's tremors continued.  At this point neurology increased his AED medication regimen with the addition of Vimpat to his twice daily Keppra. He was loaded with 200 mg Vimpat and will continue 100 mg twice daily. Neurology also recommended transfer to [facility] for video EEG monitoring and further titration of antiepileptic drugs.    Assessment: New onset partial seizures, concern for status     Plan:  - Neurology consulted, AEDs optimized at this point.  - Video EEG discontinued  - Patient will require 4-week follow-up with epilepsy attending      Metastatic adenocarcinoma (HCC)  Assessment & Plan  History of metastatic lung adenocarcinoma w/ mets to brain. Most recent staging: Stage JAY (cT3, cN1, cM1b)  He has undergone multiple rounds of chemotherapy and radiation and has undergone surgical resection for the metastatic brain lesion.  A PET scan on 3/25/2024 showed uptake in mediastinal lymph nodes compatible with metastasis.  Repeat MRI brain during current  hospitalization showed stable postsurgical changes and lack of new lesions.  He has been taking Decadron at home for vasogenic edema of the brain.    Patient now presenting with new seizure activity.  He has been evaluated at the [facility] by our neurology colleagues.  He has been started on antiepileptic drugs but has continued to have breakthrough seizures.  He is being transferred to [facility] for video EEG monitoring and additional AED titration.    Assessment: Metastatic adenocarcinoma of the lung with metastasis to the brain s/p surgical resection and SRS on daily Decadron    Plan:  - Continue Decadron at 4 mg/day  - Patient will need continued outpatient radiation treatment for his metastatic lung cancer.  We have discussed the plan with his outpatient radiation oncologist, Dr. Contreras.  Dr. Contreras would like Eugene to get his planned CT-SIM as soon as possible.  Additional radiation therapy plans require 1 to 2 weeks of planning after the study is completed.  Unfortunately, the patient cannot be admitted to [facility] and then sent to [facility] to complete the CT-SIM.  Therefore, we are having PT/OT reevaluate the patient for additional information to provide [facility] as his final acceptance has yet to be determined.  - We discussed the option of being discharged so that Eugene can continue his radiation planning/treatments with Dr. Contreras and receiving outpatient physical therapy/home physical therapy.  Eugene was agreeable to this plan if necessary.  - Follow-up PT/OT evaluation      Carcinoma of right lung (HCC)  Assessment & Plan  Patient was diagnosed with adenocarcinoma of the right lung in 2023; stage Stage JAY (cT3, cN1, cM1b)   He is s/p robotic converted to right Thoracotomy and right upper lobectomy on 9/1/2023.  He has also undergone bronchoscopy with EBUS at [facility] on 4/16/2024.  Initial pathology showing metastatic lung cell carcinoma in the most recent biopsies.  He follows with [facility] hematology oncology for ongoing cancer management and chemotherapy.    Assessment: Right lung adenocarcinoma with metastasis to brain and mediastinal lymph nodes    Plan:  - Dispo planning and logistics in process with outpatient radiation oncologist and [facility]      Constipation by delayed colonic transit  Assessment & Plan  Patient without bowel movement for several days.  This has been an ongoing problem at home as well.  Standard bowel regimen not effective while hospitalized to date.    Assessment: Constipation, likely secondary to prolonged reduced mobility and recent medication changes.  Small bowel movement this morning, will try suppository.    Patient has had bowel movements 2 days in a row    Plan:  - Continue MiraLAX twice daily  - Continue senna  - Dulcolax suppository as needed    Generalized weakness  Assessment & Plan  Patient presenting with generalized weakness in addition to his left-sided tremors. LUE paralysis at b/l LLE near paralysis.    Assessment: weakness likely multifactorial in the setting of new onset seizures (postictal?) and metastatic lung cancer receiving chemotherapy and radiation    Plan:  - PT / OT consult  - Case management consult      History of CVA (cerebrovascular accident)  Assessment & Plan  Patient has a history of stroke and has left-sided weakness as a long-term sequelae.    Assessment: History of CVA with residual left-sided weakness    Plan:  - Continue statin      Hypercholesterolemia  Assessment & Plan  Continue home statin      Essential hypertension  Assessment & Plan  Patient has known history of hypertension, no issues with blood pressure prior to transfer.  Home regimen: Amlodipine, losartan    Assessment: Hypertension, well-controlled    Plan:  - Continue amlodipine and losartan               Subjective:   Patient seen and examined at bedside this morning.  Night.  Patient feels improved this morning compared to the last several days.  The strength in his  left side is beginning to return.  He had 2 bowel movements yesterday and another bowel movement today.  He had no other acute complaints or new symptoms this morning.    Objective:     Vitals: Blood pressure 137/89, pulse 66, temperature (!) 97.4 °F (36.3 °C), resp. rate 16, SpO2 97%.,There is no height or weight on file to calculate BMI.      Intake/Output Summary (Last 24 hours) at 4/26/2024 1340  Last data filed at 4/26/2024 1208  Gross per 24 hour   Intake 478 ml   Output 1175 ml   Net -697 ml       Physical Exam:   Physical Exam  Constitutional:       General: He is not in acute distress.     Appearance: Normal appearance.   HENT:      Head: Normocephalic and atraumatic.      Right Ear: External ear normal.      Left Ear: External ear normal.      Nose: Nose normal.      Mouth/Throat:      Mouth: Mucous membranes are moist.   Eyes:      Extraocular Movements: Extraocular movements intact.   Cardiovascular:      Rate and Rhythm: Normal rate and regular rhythm.      Pulses: Normal pulses.      Heart sounds: No murmur heard.  Pulmonary:      Effort: Pulmonary effort is normal. No respiratory distress.      Breath sounds: Normal breath sounds.   Abdominal:      General: Bowel sounds are normal. There is distension.      Tenderness: There is no abdominal tenderness. There is no guarding.   Musculoskeletal:         General: No swelling or tenderness.      Cervical back: Normal range of motion.      Right lower leg: No edema.      Left lower leg: No edema.   Skin:     General: Skin is warm.   Neurological:      Mental Status: He is alert and oriented to person, place, and time.      Sensory: No sensory deficit.      Motor: Weakness present.      Comments: 2 out of 5 strength in the left upper extremity, 1 out of 5 strength in the left lower extremity   Psychiatric:         Mood and Affect: Mood normal.         Behavior: Behavior normal.         Invasive Devices       Central Venous Catheter Line  Duration              Port A Cath 04/27/23 Right Subclavian 365 days              Peripheral Intravenous Line  Duration             Peripheral IV 04/25/24 Right;Ventral (anterior) Forearm 1 day                               Lab and other studies:  I have personally reviewed pertinent reports.       Recent Results (from the past 24 hour(s))   Basic metabolic panel    Collection Time: 04/26/24  5:06 AM   Result Value Ref Range    Sodium 141 135 - 147 mmol/L    Potassium 4.1 3.5 - 5.3 mmol/L    Chloride 101 96 - 108 mmol/L    CO2 31 21 - 32 mmol/L    ANION GAP 9 4 - 13 mmol/L    BUN 18 5 - 25 mg/dL    Creatinine 0.83 0.60 - 1.30 mg/dL    Glucose 132 65 - 140 mg/dL    Calcium 9.1 8.4 - 10.2 mg/dL    eGFR 89 ml/min/1.73sq m       DVT prophylaxis: DVT chemoprophylaxis - sqh or lovenox and SCDs  Diet: Carb controlled  Code Status: FULL CODE - Level 1  Dispo: Pending physical therapy reevaluation and ARC admission assessment    Plan discussed with family medicine team.  Final plan pending attending attestation.    George Burnett MD

## 2024-04-26 NOTE — CASE MANAGEMENT
Case Management Discharge Planning Note    Patient name Paras Pitts  Location [facility]-01 MRN [id number]  : 1955 Date 2024       Current Admission Date: 2024  Current Admission Diagnosis:Tremors of nervous system   Patient Active Problem List    Diagnosis Date Noted    Constipation by delayed colonic transit 2024    Seizure-like activity (HCC) 2024    Generalized weakness 2024    Tremors of nervous system 2024    History of CVA (cerebrovascular accident) 2023    Chemotherapy-induced neutropenia (HCC) 2023    Encounter for central line care 2023    Carcinoma of right lung (HCC) 2023    Lung nodule 2023    Metastatic adenocarcinoma (HCC)     Hypercholesterolemia 2021    Essential hypertension 2020    Annual physical exam 2020    Negative depression screening 2020    Overweight (BMI 25.0-29.9) 2020      LOS (days): 4  Geometric Mean LOS (GMLOS) (days):   Days to GMLOS:     OBJECTIVE:  Risk of Unplanned Readmission Score: 15.38         Current admission status: Inpatient   Preferred Pharmacy:   [facility] [address]  [place] [address]  Phone: [phone] Fax: [phone]    Primary Care Provider: Maikel Brower DO    Primary Insurance: BLUE CROSS  Secondary Insurance: MEDICARE    DISCHARGE DETAILS:     Additional Comments: Pt will need imaging (CT-sim) first that can only be done at [place]. Treatment planning will take 1-2 weeks, then he will start 6 weeks of every other day treatments. Pending aceptatnce from [facility] with this plan

## 2024-04-26 NOTE — QUICK NOTE
Paras Pitts will need follow up in in 4 weeks with epilepsy Attending .  He will not require outpatient neurological testing.

## 2024-04-26 NOTE — QUICK NOTE
Patient seen and examined resting comfortably with wife present. Reports two bowel movements. No change to management at this time.

## 2024-04-26 NOTE — PLAN OF CARE
Problem: SAFETY ADULT  Goal: Patient will remain free of falls  Description: INTERVENTIONS:  - Educate patient/family on patient safety including physical limitations  - Instruct patient to call for assistance with activity   - Consult OT/PT to assist with strengthening/mobility   - Keep Call bell within reach  - Keep bed low and locked with side rails adjusted as appropriate  - Keep care items and personal belongings within reach  - Initiate and maintain comfort rounds  - Make Fall Risk Sign visible to staff  - Offer Toileting every 2 Hours, in advance of need  - Initiate/Maintain bed alarm  - Obtain necessary fall risk management equipment: assistive devices  - Apply yellow socks and bracelet for high fall risk patients  - Consider moving patient to room near nurses station  Outcome: Progressing     Problem: PAIN - ADULT  Goal: Verbalizes/displays adequate comfort level or baseline comfort level  Description: Interventions:  - Encourage patient to monitor pain and request assistance  - Assess pain using appropriate pain scale  - Administer analgesics based on type and severity of pain and evaluate response  - Implement non-pharmacological measures as appropriate and evaluate response  - Consider cultural and social influences on pain and pain management  - Notify physician/advanced practitioner if interventions unsuccessful or patient reports new pain  Outcome: Progressing     Problem: NEUROSENSORY - ADULT  Goal: Achieves stable or improved neurological status  Description: INTERVENTIONS  - Monitor and report changes in neurological status  - Monitor vital signs such as temperature, blood pressure, glucose, and any other labs ordered   - Initiate measures to prevent increased intracranial pressure  - Monitor for seizure activity and implement precautions if appropriate      Outcome: Progressing     Problem: Prexisting or High Potential for Compromised Skin Integrity  Goal: Skin integrity is maintained or  improved  Description: INTERVENTIONS:  - Identify patients at risk for skin breakdown  - Assess and monitor skin integrity  - Assess and monitor nutrition and hydration status  - Monitor labs   - Assess for incontinence   - Turn and reposition patient  - Assist with mobility/ambulation  - Relieve pressure over bony prominences  - Avoid friction and shearing  - Provide appropriate hygiene as needed including keeping skin clean and dry  - Evaluate need for skin moisturizer/barrier cream  - Collaborate with interdisciplinary team   - Patient/family teaching  - Consider wound care consult   Outcome: Progressing     Problem: MUSCULOSKELETAL - ADULT  Goal: Maintain or return mobility to safest level of function  Description: INTERVENTIONS:  - Assess patient's ability to carry out ADLs; assess patient's baseline for ADL function and identify physical deficits which impact ability to perform ADLs (bathing, care of mouth/teeth, toileting, grooming, dressing, etc.)  - Assess/evaluate cause of self-care deficits   - Assess range of motion  - Assess patient's mobility  - Assess patient's need for assistive devices and provide as appropriate  - Encourage maximum independence but intervene and supervise when necessary  - Involve family in performance of ADLs  - Assess for home care needs following discharge   - Consider OT consult to assist with ADL evaluation and planning for discharge  - Provide patient education as appropriate  Outcome: Progressing

## 2024-04-27 PROCEDURE — 99233 SBSQ HOSP IP/OBS HIGH 50: CPT | Performed by: FAMILY MEDICINE

## 2024-04-27 RX ADMIN — LACOSAMIDE 200 MG: 200 TABLET, FILM COATED ORAL at 08:23

## 2024-04-27 RX ADMIN — CLOBAZAM 5 MG: 10 TABLET ORAL at 08:44

## 2024-04-27 RX ADMIN — LOSARTAN POTASSIUM 50 MG: 50 TABLET, FILM COATED ORAL at 08:23

## 2024-04-27 RX ADMIN — POLYETHYLENE GLYCOL 3350 17 G: 17 POWDER, FOR SOLUTION ORAL at 21:46

## 2024-04-27 RX ADMIN — AMLODIPINE BESYLATE 10 MG: 10 TABLET ORAL at 08:23

## 2024-04-27 RX ADMIN — DEXAMETHASONE 4 MG: 4 TABLET ORAL at 08:23

## 2024-04-27 RX ADMIN — POLYETHYLENE GLYCOL 3350 17 G: 17 POWDER, FOR SOLUTION ORAL at 08:24

## 2024-04-27 RX ADMIN — ATORVASTATIN CALCIUM 40 MG: 40 TABLET, FILM COATED ORAL at 17:27

## 2024-04-27 RX ADMIN — LEVETIRACETAM 2000 MG: 750 TABLET, FILM COATED ORAL at 20:54

## 2024-04-27 RX ADMIN — PRAMIPEXOLE DIHYDROCHLORIDE 0.25 MG: 0.25 TABLET ORAL at 21:45

## 2024-04-27 RX ADMIN — CLOBAZAM 5 MG: 10 TABLET ORAL at 17:27

## 2024-04-27 RX ADMIN — SENNOSIDES 8.6 MG: 8.6 TABLET, FILM COATED ORAL at 21:45

## 2024-04-27 RX ADMIN — ENOXAPARIN SODIUM 40 MG: 40 INJECTION SUBCUTANEOUS at 08:23

## 2024-04-27 RX ADMIN — PRAMIPEXOLE DIHYDROCHLORIDE 0.25 MG: 0.25 TABLET ORAL at 08:23

## 2024-04-27 RX ADMIN — PANTOPRAZOLE SODIUM 40 MG: 40 TABLET, DELAYED RELEASE ORAL at 05:18

## 2024-04-27 RX ADMIN — LEVETIRACETAM 2000 MG: 750 TABLET, FILM COATED ORAL at 08:23

## 2024-04-27 RX ADMIN — LACOSAMIDE 200 MG: 200 TABLET, FILM COATED ORAL at 21:45

## 2024-04-27 RX ADMIN — PRAMIPEXOLE DIHYDROCHLORIDE 0.25 MG: 0.25 TABLET ORAL at 17:27

## 2024-04-27 RX ADMIN — ACETAMINOPHEN 975 MG: 325 TABLET, FILM COATED ORAL at 22:39

## 2024-04-27 NOTE — ASSESSMENT & PLAN NOTE
Patient was originally admitted to the [facility] on 4/20/2024 after acute onset tremors of the right upper and lower extremity.  He does have a history of stroke with left-sided weakness and his new tremors were thought to be related to his previous neurologic injury.  Initial stroke workup was negative and neurology was consulted.  Imaging was obtained including MRI that showed no additional evidence of stroke/TIA. Neurology then began an evaluation for seizure like activity in this patient because of his history of brain metastasis and surgical resection of brain mets.  He was loaded with Keppra and was admitted for close monitoring.  Despite his initial AED dosing the patient's tremors continued.  At this point neurology increased his AED medication regimen with the addition of Vimpat to his twice daily Keppra. He was loaded with 200 mg Vimpat and will continue 100 mg twice daily. Neurology also recommended transfer to [facility] for video EEG monitoring and further titration of antiepileptic drugs.    Assessment: New onset partial seizures, concern for status     Plan:  - Neurology consulted, AEDs optimized at this point.  - Video EEG discontinued  - Patient will require 4-week follow-up with epilepsy attending

## 2024-04-27 NOTE — PLAN OF CARE
Problem: Prexisting or High Potential for Compromised Skin Integrity  Goal: Skin integrity is maintained or improved  Description: INTERVENTIONS:  - Identify patients at risk for skin breakdown  - Assess and monitor skin integrity  - Assess and monitor nutrition and hydration status  - Monitor labs   - Assess for incontinence   - Turn and reposition patient  - Assist with mobility/ambulation  - Relieve pressure over bony prominences  - Avoid friction and shearing  - Provide appropriate hygiene as needed including keeping skin clean and dry  - Evaluate need for skin moisturizer/barrier cream  - Collaborate with interdisciplinary team   - Patient/family teaching  - Consider wound care consult   Outcome: Progressing     Problem: NEUROSENSORY - ADULT  Goal: Achieves stable or improved neurological status  Description: INTERVENTIONS  - Monitor and report changes in neurological status  - Monitor vital signs such as temperature, blood pressure, glucose, and any other labs ordered   - Initiate measures to prevent increased intracranial pressure  - Monitor for seizure activity and implement precautions if appropriate      Outcome: Progressing  Goal: Remains free of injury related to seizures activity  Description: INTERVENTIONS  - Maintain airway, patient safety  and administer oxygen as ordered  - Monitor patient for seizure activity, document and report duration and description of seizure to physician/advanced practitioner  - If seizure occurs,  ensure patient safety during seizure  - Reorient patient post seizure  - Seizure pads on all 4 side rails  - Instruct patient/family to notify RN of any seizure activity including if an aura is experienced  - Instruct patient/family to call for assistance with activity based on nursing assessment  - Administer anti-seizure medications if ordered    Outcome: Progressing  Goal: Achieves maximal functionality and self care  Description: INTERVENTIONS  - Monitor swallowing and  airway patency with patient fatigue and changes in neurological status  - Encourage and assist patient to increase activity and self care.   - Encourage visually impaired, hearing impaired and aphasic patients to use assistive/communication devices  Outcome: Progressing

## 2024-04-27 NOTE — ASSESSMENT & PLAN NOTE
Patient was diagnosed with adenocarcinoma of the right lung in 2023; stage Stage JAY (cT3, cN1, cM1b)   He is s/p robotic converted to right Thoracotomy and right upper lobectomy on 9/1/2023.  He has also undergone bronchoscopy with EBUS at [facility] on 4/16/2024.  Initial pathology showing metastatic lung cell carcinoma in the most recent biopsies.  He follows with [facility] hematology oncology for ongoing cancer management and chemotherapy.    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

## 2024-04-27 NOTE — PLAN OF CARE
Problem: Prexisting or High Potential for Compromised Skin Integrity  Goal: Skin integrity is maintained or improved  Description: INTERVENTIONS:  - Identify patients at risk for skin breakdown  - Assess and monitor skin integrity  - Assess and monitor nutrition and hydration status  - Monitor labs   - Assess for incontinence   - Turn and reposition patient  - Assist with mobility/ambulation  - Relieve pressure over bony prominences  - Avoid friction and shearing  - Provide appropriate hygiene as needed including keeping skin clean and dry  - Evaluate need for skin moisturizer/barrier cream  - Collaborate with interdisciplinary team   - Patient/family teaching  - Consider wound care consult   Outcome: Progressing     Problem: NEUROSENSORY - ADULT  Goal: Achieves stable or improved neurological status  Description: INTERVENTIONS  - Monitor and report changes in neurological status  - Monitor vital signs such as temperature, blood pressure, glucose, and any other labs ordered   - Initiate measures to prevent increased intracranial pressure  - Monitor for seizure activity and implement precautions if appropriate      Outcome: Progressing  Goal: Remains free of injury related to seizures activity  Description: INTERVENTIONS  - Maintain airway, patient safety  and administer oxygen as ordered  - Monitor patient for seizure activity, document and report duration and description of seizure to physician/advanced practitioner  - If seizure occurs,  ensure patient safety during seizure  - Reorient patient post seizure  - Seizure pads on all 4 side rails  - Instruct patient/family to notify RN of any seizure activity including if an aura is experienced  - Instruct patient/family to call for assistance with activity based on nursing assessment  - Administer anti-seizure medications if ordered    Outcome: Progressing  Goal: Achieves maximal functionality and self care  Description: INTERVENTIONS  - Monitor swallowing and  airway patency with patient fatigue and changes in neurological status  - Encourage and assist patient to increase activity and self care.   - Encourage visually impaired, hearing impaired and aphasic patients to use assistive/communication devices  Outcome: Progressing     Problem: MUSCULOSKELETAL - ADULT  Goal: Maintain or return mobility to safest level of function  Description: INTERVENTIONS:  - Assess patient's ability to carry out ADLs; assess patient's baseline for ADL function and identify physical deficits which impact ability to perform ADLs (bathing, care of mouth/teeth, toileting, grooming, dressing, etc.)  - Assess/evaluate cause of self-care deficits   - Assess range of motion  - Assess patient's mobility  - Assess patient's need for assistive devices and provide as appropriate  - Encourage maximum independence but intervene and supervise when necessary  - Involve family in performance of ADLs  - Assess for home care needs following discharge   - Consider OT consult to assist with ADL evaluation and planning for discharge  - Provide patient education as appropriate  Outcome: Progressing  Goal: Maintain proper alignment of affected body part  Description: INTERVENTIONS:  - Support, maintain and protect limb and body alignment  - Provide patient/ family with appropriate education  Outcome: Progressing     Problem: PAIN - ADULT  Goal: Verbalizes/displays adequate comfort level or baseline comfort level  Description: Interventions:  - Encourage patient to monitor pain and request assistance  - Assess pain using appropriate pain scale  - Administer analgesics based on type and severity of pain and evaluate response  - Implement non-pharmacological measures as appropriate and evaluate response  - Consider cultural and social influences on pain and pain management  - Notify physician/advanced practitioner if interventions unsuccessful or patient reports new pain  Outcome: Progressing     Problem: DISCHARGE  PLANNING  Goal: Discharge to home or other facility with appropriate resources  Description: INTERVENTIONS:  - Identify barriers to discharge w/patient and caregiver  - Arrange for needed discharge resources and transportation as appropriate  - Identify discharge learning needs (meds, wound care, etc.)  - Arrange for interpretive services to assist at discharge as needed  - Refer to Case Management Department for coordinating discharge planning if the patient needs post-hospital services based on physician/advanced practitioner order or complex needs related to functional status, cognitive ability, or social support system  Outcome: Progressing     Problem: Knowledge Deficit  Goal: Patient/family/caregiver demonstrates understanding of disease process, treatment plan, medications, and discharge instructions  Description: Complete learning assessment and assess knowledge base.  Interventions:  - Provide teaching at level of understanding  - Provide teaching via preferred learning methods  Outcome: Progressing     Problem: SAFETY ADULT  Goal: Patient will remain free of falls  Description: INTERVENTIONS:  - Educate patient/family on patient safety including physical limitations  - Instruct patient to call for assistance with activity   - Consult OT/PT to assist with strengthening/mobility   - Keep Call bell within reach  - Keep bed low and locked with side rails adjusted as appropriate  - Keep care items and personal belongings within reach  - Initiate and maintain comfort rounds  - Make Fall Risk Sign visible to staff  - Offer Toileting every 2 Hours, in advance of need  - Initiate/Maintain bed alarm  - Obtain necessary fall risk management equipment: assistive devices  - Apply yellow socks and bracelet for high fall risk patients  - Consider moving patient to room near nurses station  Outcome: Progressing

## 2024-04-27 NOTE — PROGRESS NOTES
Progress Notes - Family Medicine Residency, Elif Crain [facility] 1955, 68 y.o. male.  MRN: [id number]  Unit/Bed#: [facility] 732-01 Encounter: [id number]  Primary Care Provider: Maikel Brower DO      Admission Date: 4/22/2024 1846  Length of Stay: 5 days  Code Status:  Level 1 - Full Code  Disposition:   Consult:   IP CONSULT TO NEUROLOGY  IP CONSULT TO CASE MANAGEMENT         Assessment/Plan:      Plans discussed with [facility] team and finalization is pending attending physician attestation. Please, call [phone] for any clarification.     * Tremors of nervous system  Assessment & Plan  Patient was originally admitted to the [facility] on 4/20/2024 after acute onset tremors of the right upper and lower extremity.  He does have a history of stroke with left-sided weakness and his new tremors were thought to be related to his previous neurologic injury.  Initial stroke workup was negative and neurology was consulted.  Imaging was obtained including MRI that showed no additional evidence of stroke/TIA. Neurology then began an evaluation for seizure like activity in this patient because of his history of brain metastasis and surgical resection of brain mets.  He was loaded with Keppra and was admitted for close monitoring.  Despite his initial AED dosing the patient's tremors continued.  At this point neurology increased his AED medication regimen with the addition of Vimpat to his twice daily Keppra. He was loaded with 200 mg Vimpat and will continue 100 mg twice daily. Neurology also recommended transfer to [facility] for video EEG monitoring and further titration of antiepileptic drugs.    Assessment: New onset partial seizures, concern for status     Plan:  - Neurology consulted, AEDs optimized at this point.  - Video EEG discontinued  - Patient will require 4-week follow-up with epilepsy attending       Metastatic adenocarcinoma (HCC)  Assessment & Plan  History of metastatic lung adenocarcinoma w/  mets to brain. Most recent staging: Stage JAY (cT3, cN1, cM1b)  He has undergone multiple rounds of chemotherapy and radiation and has undergone surgical resection for the metastatic brain lesion.  A PET scan on 3/25/2024 showed uptake in mediastinal lymph nodes compatible with metastasis.  Repeat MRI brain during current hospitalization showed stable postsurgical changes and lack of new lesions.  He has been taking Decadron at home for vasogenic edema of the brain.    Patient now presenting with new seizure activity.  He has been evaluated at the [facility] by our neurology colleagues.  He has been started on antiepileptic drugs but has continued to have breakthrough seizures.  He is being transferred to [facility] for video EEG monitoring and additional AED titration.    Assessment: Metastatic adenocarcinoma of the lung with metastasis to the brain s/p surgical resection and SRS on daily Decadron    Plan:  - Continue Decadron at 4 mg/day  - Patient will need continued outpatient radiation treatment for his metastatic lung cancer.  We have discussed the plan with his outpatient radiation oncologist, Dr. Contreras.  Dr. Contreras would like Eugene to get his planned CT-SIM as soon as possible.  Additional radiation therapy plans require 1 to 2 weeks of planning after the study is completed.  Unfortunately, the patient cannot be admitted to [facility] and then sent to [facility] to complete the CT-SIM.  Therefore, we are having PT/OT reevaluate the patient for additional information to provide [facility] as his final acceptance has yet to be determined.  - We discussed the option of being discharged so that Eugene can continue his radiation planning/treatments with Dr. Contreras and receiving outpatient physical therapy/home physical therapy.  Eugene was agreeable to this plan if necessary.  - Follow-up PT/OT evaluation       Constipation by delayed colonic transit  Assessment & Plan  Patient without bowel movement for  several days.  This has been an ongoing problem at home as well.  Standard bowel regimen not effective while hospitalized to date.    Assessment: Constipation, likely secondary to prolonged reduced mobility and recent medication changes.  Small bowel movement this morning, will try suppository.    Patient has had bowel movements 2 days in a row    Plan:  - Continue MiraLAX twice daily  - Continue senna  - Dulcolax suppository as needed     Generalized weakness  Assessment & Plan  Patient presenting with generalized weakness in addition to his left-sided tremors. LUE paralysis at b/l LLE near paralysis.    Assessment: weakness likely multifactorial in the setting of new onset seizures (postictal?) and metastatic lung cancer receiving chemotherapy and radiation    Plan:  - PT / OT consult  - Case management consult       History of CVA (cerebrovascular accident)  Assessment & Plan  Patient has a history of stroke and has left-sided weakness as a long-term sequelae.    Assessment: History of CVA with residual left-sided weakness    Plan:  - Continue statin       Carcinoma of right lung (HCC)  Assessment & Plan  Patient was diagnosed with adenocarcinoma of the right lung in 2023; stage Stage JAY (cT3, cN1, cM1b)   He is s/p robotic converted to right Thoracotomy and right upper lobectomy on 9/1/2023.  He has also undergone bronchoscopy with EBUS at [facility] on 4/16/2024.  Initial pathology showing metastatic lung cell carcinoma in the most recent biopsies.  He follows with [facility] hematology oncology for ongoing cancer management and chemotherapy.    Assessment: Right lung adenocarcinoma with metastasis to brain and mediastinal lymph nodes    Plan:  - Dispo planning and logistics in process with outpatient radiation oncologist and [facility]       Hypercholesterolemia  Assessment & Plan  Continue home statin       Essential hypertension  Assessment & Plan  Patient has known history of hypertension, no issues with blood pressure  prior to transfer.  Home regimen: Amlodipine, losartan    Assessment: Hypertension, well-controlled    Plan:  - Continue amlodipine and losartan              Diet: Diet Cardiovascular; Cardiac    VTE Pharm PPX: Enoxaparin (Lovenox)  VTE [facility] PPX: sequential compression device      Subjective:   68 y.o. male admitted 4/22/2024 for tremors in extremity.     Today 04/27/24, HD# 5    Per handoff, patient without acute events overnight.   Per nursing staff, no concern or report.  Patient seen and examined at bedside and without questions or concerns. Patient denies CP, SOB, abdominal pain, N/V, headaches, dizziness, lightheadedness, changes in bowel movements, urinary symptoms at this time.   Patient excited that he is able to to move L leg little more than before   Medical management and treatment was discussed with patient, patient understands and agrees with plan.     Objective:     Vitals:    04/26/24 1414 04/26/24 2130 04/27/24 0722 04/27/24 1544   BP: 131/81 133/80 147/86 142/89   Pulse: 79 76 68 80   Resp: 12 18 16 16   Temp: 97.8 °F (36.6 °C) 97.7 °F (36.5 °C) 98 °F (36.7 °C) 97.8 °F (36.6 °C)   TempSrc:       SpO2: 93% 95% 95% 95%     Temp:  [97.7 °F (36.5 °C)-98 °F (36.7 °C)] 97.8 °F (36.6 °C)  HR:  [68-80] 80  Resp:  [16-18] 16  BP: (133-147)/(80-89) 142/89  Weight (last 2 days)       None            Intake/Output Summary (Last 24 hours) at 4/27/2024 1600  Last data filed at 4/27/2024 1107  Gross per 24 hour   Intake --   Output 1650 ml   Net -1650 ml     Invasive Devices       Central Venous Catheter Line  Duration             Port A Cath 04/27/23 Right Subclavian 366 days              Peripheral Intravenous Line  Duration             Peripheral IV 04/27/24 Distal;Right;Upper;Ventral (anterior) Arm <1 day                      Physical Exam:     Physical Exam  Constitutional:       General: He is not in acute distress.     Appearance: Normal appearance.   HENT:      Head: Normocephalic and atraumatic.       Right Ear: External ear normal.      Left Ear: External ear normal.      Mouth/Throat:      Mouth: Mucous membranes are moist.   Eyes:      Extraocular Movements: Extraocular movements intact.   Cardiovascular:      Rate and Rhythm: Normal rate and regular rhythm.      Pulses: Normal pulses.      Heart sounds: Normal heart sounds.   Pulmonary:      Effort: Pulmonary effort is normal.      Breath sounds: Normal breath sounds. No wheezing, rhonchi or rales.   Abdominal:      General: Abdomen is protuberant. Bowel sounds are normal. There is distension.      Hernia: A hernia is present. Hernia is present in the umbilical area.   Musculoskeletal:      Right lower leg: No edema.      Left lower leg: No edema.      Comments: RLE and RUE 5/5 strength  LUE 3/5, LLE 1/5 strength   Skin:     Capillary Refill: Capillary refill takes less than 2 seconds.   Neurological:      Mental Status: He is alert and oriented to person, place, and time.      Cranial Nerves: No cranial nerve deficit.      Comments: Involuntary twitching of left lower extremity witnessed every 2-5 seconds   Psychiatric:         Mood and Affect: Mood normal.         Behavior: Behavior normal.           Labs:     CBC:  Results from last 7 days   Lab Units 04/23/24  0528 04/22/24  0503 04/21/24  0426   WBC Thousand/uL 7.71 7.34 7.92   HEMOGLOBIN g/dL 13.6 13.9 13.6   HEMATOCRIT % 41.2 42.3 40.5   PLATELETS Thousands/uL 137* 128* 124*   TOTAL NEUT ABS Thousands/µL 4.68 4.76  --        CMP:  Results from last 7 days   Lab Units 04/26/24  0506 04/24/24  0456 04/23/24  0528 04/22/24  0503 04/21/24  0426   POTASSIUM mmol/L 4.1 3.9 3.7 3.7 3.4*   CHLORIDE mmol/L 101 102 102 104 105   CO2 mmol/L 31 28 27 28 28   BUN mg/dL 18 14 17 13 12   CREATININE mg/dL 0.83 0.72 0.78 0.80 0.76   CALCIUM mg/dL 9.1 9.0 8.8 9.0 9.0   EGFR ml/min/1.73sq m 89 94 91 90 92       Sepsis:        Micro:         Imaging:     No results found.      Medications:     Current  Facility-Administered Medications   Medication Dose Route Frequency    acetaminophen (TYLENOL) tablet 975 mg  975 mg Oral Q8H PRN    aluminum-magnesium hydroxide-simethicone (MAALOX) oral suspension 30 mL  30 mL Oral Q6H PRN    amLODIPine (NORVASC) tablet 10 mg  10 mg Oral Daily    atorvastatin (LIPITOR) tablet 40 mg  40 mg Oral After Dinner    bisacodyl (DULCOLAX) rectal suppository 10 mg  10 mg Rectal Daily PRN    cloBAZam (ONFI) tablet 5 mg  5 mg Oral BID    dexamethasone (DECADRON) tablet 4 mg  4 mg Oral Daily    enoxaparin (LOVENOX) subcutaneous injection 40 mg  40 mg Subcutaneous Daily    lacosamide (VIMPAT) tablet 200 mg  200 mg Oral Q12H LEN    levETIRAcetam (KEPPRA) tablet 2,000 mg  2,000 mg Oral Q12H LEN    losartan (COZAAR) tablet 50 mg  50 mg Oral Daily    ondansetron (ZOFRAN-ODT) dispersible tablet 4 mg  4 mg Oral Q6H PRN    pantoprazole (PROTONIX) EC tablet 40 mg  40 mg Oral Early Morning    polyethylene glycol (MIRALAX) packet 17 g  17 g Oral BID    pramipexole (MIRAPEX) tablet 0.25 mg  0.25 mg Oral TID    senna (SENOKOT) tablet 8.6 mg  1 tablet Oral HS         Katie Martínez MD  Family Medicine, PGY-2

## 2024-04-27 NOTE — ASSESSMENT & PLAN NOTE
History of metastatic lung adenocarcinoma w/ mets to brain. Most recent staging: Stage JAY (cT3, cN1, cM1b)  He has undergone multiple rounds of chemotherapy and radiation and has undergone surgical resection for the metastatic brain lesion.  A PET scan on 3/25/2024 showed uptake in mediastinal lymph nodes compatible with metastasis.  Repeat MRI brain during current hospitalization showed stable postsurgical changes and lack of new lesions.  He has been taking Decadron at home for vasogenic edema of the brain.    Patient now presenting with new seizure activity.  He has been evaluated at the [facility] by our neurology colleagues.  He has been started on antiepileptic drugs but has continued to have breakthrough seizures.  He is being transferred to [facility] for video EEG monitoring and additional AED titration.    Assessment: Metastatic adenocarcinoma of the lung with metastasis to the brain s/p surgical resection and SRS on daily Decadron    Plan:  - Continue Decadron at 4 mg/day  - Will continue to engage in logistical planning for CT SIM/continued radiation therapy with Dr. Contreras.  Likely updates tomorrow, 4/29/2024.

## 2024-04-27 NOTE — ASSESSMENT & PLAN NOTE
Patient without bowel movement for several days.  This has been an ongoing problem at home as well.  Standard bowel regimen not effective while hospitalized to date.    Assessment: Constipation, likely secondary to prolonged reduced mobility and recent medication changes.  Small bowel movement this morning, will try suppository.    Patient has been having regular bowel movements    Plan:  - Continue MiraLAX twice daily  - Continue senna  - Dulcolax suppository as needed

## 2024-04-28 LAB
ANION GAP SERPL CALCULATED.3IONS-SCNC: 9 MMOL/L (ref 4–13)
BUN SERPL-MCNC: 17 MG/DL (ref 5–25)
CALCIUM SERPL-MCNC: 8.9 MG/DL (ref 8.4–10.2)
CHLORIDE SERPL-SCNC: 103 MMOL/L (ref 96–108)
CO2 SERPL-SCNC: 29 MMOL/L (ref 21–32)
CREAT SERPL-MCNC: 0.8 MG/DL (ref 0.6–1.3)
GFR SERPL CREATININE-BSD FRML MDRD: 90 ML/MIN/1.73SQ M
GLUCOSE SERPL-MCNC: 142 MG/DL (ref 65–140)
POTASSIUM SERPL-SCNC: 3.7 MMOL/L (ref 3.5–5.3)
SODIUM SERPL-SCNC: 141 MMOL/L (ref 135–147)

## 2024-04-28 PROCEDURE — 80048 BASIC METABOLIC PNL TOTAL CA: CPT

## 2024-04-28 PROCEDURE — 99233 SBSQ HOSP IP/OBS HIGH 50: CPT | Performed by: FAMILY MEDICINE

## 2024-04-28 RX ADMIN — POLYETHYLENE GLYCOL 3350 17 G: 17 POWDER, FOR SOLUTION ORAL at 22:06

## 2024-04-28 RX ADMIN — SENNOSIDES 8.6 MG: 8.6 TABLET, FILM COATED ORAL at 22:06

## 2024-04-28 RX ADMIN — DEXAMETHASONE 4 MG: 4 TABLET ORAL at 08:15

## 2024-04-28 RX ADMIN — AMLODIPINE BESYLATE 10 MG: 10 TABLET ORAL at 08:15

## 2024-04-28 RX ADMIN — LACOSAMIDE 200 MG: 200 TABLET, FILM COATED ORAL at 22:06

## 2024-04-28 RX ADMIN — LEVETIRACETAM 2000 MG: 750 TABLET, FILM COATED ORAL at 08:15

## 2024-04-28 RX ADMIN — PRAMIPEXOLE DIHYDROCHLORIDE 0.25 MG: 0.25 TABLET ORAL at 08:16

## 2024-04-28 RX ADMIN — CLOBAZAM 5 MG: 10 TABLET ORAL at 08:25

## 2024-04-28 RX ADMIN — PANTOPRAZOLE SODIUM 40 MG: 40 TABLET, DELAYED RELEASE ORAL at 06:00

## 2024-04-28 RX ADMIN — PRAMIPEXOLE DIHYDROCHLORIDE 0.25 MG: 0.25 TABLET ORAL at 23:00

## 2024-04-28 RX ADMIN — POLYETHYLENE GLYCOL 3350 17 G: 17 POWDER, FOR SOLUTION ORAL at 08:16

## 2024-04-28 RX ADMIN — PRAMIPEXOLE DIHYDROCHLORIDE 0.25 MG: 0.25 TABLET ORAL at 18:40

## 2024-04-28 RX ADMIN — LACOSAMIDE 200 MG: 200 TABLET, FILM COATED ORAL at 08:15

## 2024-04-28 RX ADMIN — CLOBAZAM 5 MG: 10 TABLET ORAL at 18:39

## 2024-04-28 RX ADMIN — LOSARTAN POTASSIUM 50 MG: 50 TABLET, FILM COATED ORAL at 08:16

## 2024-04-28 RX ADMIN — ENOXAPARIN SODIUM 40 MG: 40 INJECTION SUBCUTANEOUS at 08:15

## 2024-04-28 RX ADMIN — ATORVASTATIN CALCIUM 40 MG: 40 TABLET, FILM COATED ORAL at 18:40

## 2024-04-28 RX ADMIN — LEVETIRACETAM 2000 MG: 750 TABLET, FILM COATED ORAL at 20:31

## 2024-04-28 NOTE — QUICK NOTE
Pt seen during evening rounds. Sitting comfortably in bed watching TV with wife present at bedside. Patient denies any acute complaints or concerns. Wife inquires about any updates on plan for Sim scan, informed her day team will update her when that information is available.

## 2024-04-28 NOTE — PROGRESS NOTES
04/27/24 2300   Provider Notification   Reason for Communication Other (Comment)   Provider Name TERRIE Contreras   Provider Role Resident   Method of Communication Other (Comment)  (TT)   Response No new orders   Notification Time 2251   Shift Event Other (Comment)     Pt's wife reported episode of staring/unresponsive when putting the head of the bed down so the patient could get higher in the bed. Once the head of bed was raised, the pt responded to her and asked why she was waking him. No changes to my assessment or pt mental/neuro. No new orders

## 2024-04-28 NOTE — QUICK NOTE
Patient evaluated at bedside during evening rounds. Patient laying comfortably in bed. Family at bedside.    /89   Pulse 80   Temp 97.8 °F (36.6 °C)   Resp 16   SpO2 95%     Gen.: Well-appearing, no acute distress  HEENT: Normocephalic, no conjunctival injection, jaundice. Mucous membranes moist, no lesions  Heart: Regular rate and rhythm, no murmurs, rubs, or gallops  Lungs: Clear to auscultation bilaterally, no wheezing, rales, or rhonchi, no accessory muscle use  Abdomen: Nontender, distended, bowel sounds positive  Extremities: Warm and well perfused. Difficulty with moving L leg, improved.  Skin: No rashes  Neuro: Alert, awake, oriented.    A/P: Currently stable. Continue current management.

## 2024-04-29 ENCOUNTER — TELEPHONE (OUTPATIENT)
Age: 69
End: 2024-04-29

## 2024-04-29 PROCEDURE — 97110 THERAPEUTIC EXERCISES: CPT

## 2024-04-29 PROCEDURE — 99233 SBSQ HOSP IP/OBS HIGH 50: CPT | Performed by: FAMILY MEDICINE

## 2024-04-29 PROCEDURE — 97530 THERAPEUTIC ACTIVITIES: CPT

## 2024-04-29 PROCEDURE — 97535 SELF CARE MNGMENT TRAINING: CPT

## 2024-04-29 RX ORDER — POLYETHYLENE GLYCOL 3350 17 G/17G
17 POWDER, FOR SOLUTION ORAL DAILY
Status: DISCONTINUED | OUTPATIENT
Start: 2024-04-30 | End: 2024-05-01 | Stop reason: HOSPADM

## 2024-04-29 RX ADMIN — POLYETHYLENE GLYCOL 3350 17 G: 17 POWDER, FOR SOLUTION ORAL at 09:16

## 2024-04-29 RX ADMIN — SENNOSIDES 8.6 MG: 8.6 TABLET, FILM COATED ORAL at 21:20

## 2024-04-29 RX ADMIN — AMLODIPINE BESYLATE 10 MG: 10 TABLET ORAL at 09:15

## 2024-04-29 RX ADMIN — ACETAMINOPHEN 975 MG: 325 TABLET, FILM COATED ORAL at 03:36

## 2024-04-29 RX ADMIN — PRAMIPEXOLE DIHYDROCHLORIDE 0.25 MG: 0.25 TABLET ORAL at 09:15

## 2024-04-29 RX ADMIN — DEXAMETHASONE 4 MG: 4 TABLET ORAL at 09:15

## 2024-04-29 RX ADMIN — LACOSAMIDE 200 MG: 200 TABLET, FILM COATED ORAL at 09:15

## 2024-04-29 RX ADMIN — ENOXAPARIN SODIUM 40 MG: 40 INJECTION SUBCUTANEOUS at 09:15

## 2024-04-29 RX ADMIN — LOSARTAN POTASSIUM 50 MG: 50 TABLET, FILM COATED ORAL at 09:15

## 2024-04-29 RX ADMIN — PRAMIPEXOLE DIHYDROCHLORIDE 0.25 MG: 0.25 TABLET ORAL at 21:20

## 2024-04-29 RX ADMIN — LEVETIRACETAM 2000 MG: 750 TABLET, FILM COATED ORAL at 20:26

## 2024-04-29 RX ADMIN — LACOSAMIDE 200 MG: 200 TABLET, FILM COATED ORAL at 21:20

## 2024-04-29 RX ADMIN — CLOBAZAM 5 MG: 10 TABLET ORAL at 17:53

## 2024-04-29 RX ADMIN — PRAMIPEXOLE DIHYDROCHLORIDE 0.25 MG: 0.25 TABLET ORAL at 15:04

## 2024-04-29 RX ADMIN — LEVETIRACETAM 2000 MG: 750 TABLET, FILM COATED ORAL at 09:15

## 2024-04-29 RX ADMIN — PANTOPRAZOLE SODIUM 40 MG: 40 TABLET, DELAYED RELEASE ORAL at 05:57

## 2024-04-29 RX ADMIN — ATORVASTATIN CALCIUM 40 MG: 40 TABLET, FILM COATED ORAL at 17:53

## 2024-04-29 RX ADMIN — CLOBAZAM 5 MG: 10 TABLET ORAL at 09:19

## 2024-04-29 NOTE — ARC ADMISSION
Reviewed updates with ARC physician - patient is preapproved for [facility] pending medical stability with completion of CT-SIM, functional progress/tolerance, insurance authorization and bed availability. Noted patient is for tentative CT-SIM today vs tomorrow. Will also need updated PT/OT notes as able. CM has been updated. Will continue to follow patient's case at this time.

## 2024-04-29 NOTE — PHYSICAL THERAPY NOTE
PHYSICAL THERAPY CANCEL NOTE          Patient Name: Paras Pitts  Today's Date: 4/29/2024 04/29/24 1446   PT Last Visit   PT Visit Date 04/29/24   Note Type   Note type Cancelled Session   Cancel Reasons Patient off floor/test     Arben Cox, PT, DPT

## 2024-04-29 NOTE — ASSESSMENT & PLAN NOTE
Patient without bowel movement for several days.  This has been an ongoing problem at home as well.  Standard bowel regimen not effective while hospitalized to date.    Assessment: Constipation, likely secondary to prolonged reduced mobility and recent medication changes.  Small bowel movement this morning, will try suppository.    Improving    Plan:  - Decrease MiraLAX to once daily  - Continue senna  - Dulcolax suppository as needed

## 2024-04-29 NOTE — ASSESSMENT & PLAN NOTE
History of metastatic lung adenocarcinoma w/ mets to brain. Most recent staging: Stage JAY (cT3, cN1, cM1b)  He has undergone multiple rounds of chemotherapy and radiation and has undergone surgical resection for the metastatic brain lesion.  A PET scan on 3/25/2024 showed uptake in mediastinal lymph nodes compatible with metastasis.  Repeat MRI brain during current hospitalization showed stable postsurgical changes and lack of new lesions.  He has been taking Decadron at home for vasogenic edema of the brain.    Patient now presenting with new seizure activity.  He has been evaluated at the [facility] by our neurology colleagues.  He has been started on antiepileptic drugs but has continued to have breakthrough seizures.  He is being transferred to [facility] for video EEG monitoring and additional AED titration.    Assessment: Metastatic adenocarcinoma of the lung with metastasis to the brain s/p surgical resection and SRS on daily Decadron    Plan:  - Continue Decadron at 4 mg/day  - Patient going for CT-SIM at [facility] today

## 2024-04-29 NOTE — CASE MANAGEMENT
Case Management Discharge Planning Note    Patient name Paras Pitts  Location [facility] 732/[facility]-01 MRN [id number]  : 1955 Date 2024       Current Admission Date: 2024  Current Admission Diagnosis:Tremors of nervous system   Patient Active Problem List    Diagnosis Date Noted    Constipation by delayed colonic transit 2024    Seizure-like activity (HCC) 2024    Generalized weakness 2024    Tremors of nervous system 2024    History of CVA (cerebrovascular accident) 2023    Chemotherapy-induced neutropenia (HCC) 2023    Encounter for central line care 2023    Carcinoma of right lung (HCC) 2023    Lung nodule 2023    Metastatic adenocarcinoma (HCC)     Hypercholesterolemia 2021    Essential hypertension 2020    Annual physical exam 2020    Negative depression screening 2020    Overweight (BMI 25.0-29.9) 2020      LOS (days): 7  Geometric Mean LOS (GMLOS) (days):   Days to GMLOS:     OBJECTIVE:  Risk of Unplanned Readmission Score: 16.04         Current admission status: Inpatient   Preferred Pharmacy:   [facility] [address]  [place] [address]  Phone: [phone] Fax: [phone]    Primary Care Provider: Maikel Brower DO    Primary Insurance: BLUE CROSS  Secondary Insurance: MEDICARE    DISCHARGE DETAILS:      Additional Comments: Ongoing communication with [facility], [facility]. Pt will transfer to  for CT sim @ 1pm today. Once completed and pt remains stable auth will be submitted for [facility]C

## 2024-04-29 NOTE — PLAN OF CARE
Problem: Prexisting or High Potential for Compromised Skin Integrity  Goal: Skin integrity is maintained or improved  Description: INTERVENTIONS:  - Identify patients at risk for skin breakdown  - Assess and monitor skin integrity  - Assess and monitor nutrition and hydration status  - Monitor labs   - Assess for incontinence   - Turn and reposition patient  - Assist with mobility/ambulation  - Relieve pressure over bony prominences  - Avoid friction and shearing  - Provide appropriate hygiene as needed including keeping skin clean and dry  - Evaluate need for skin moisturizer/barrier cream  - Collaborate with interdisciplinary team   - Patient/family teaching  - Consider wound care consult   Outcome: Progressing     Problem: NEUROSENSORY - ADULT  Goal: Achieves stable or improved neurological status  Description: INTERVENTIONS  - Monitor and report changes in neurological status  - Monitor vital signs such as temperature, blood pressure, glucose, and any other labs ordered   - Initiate measures to prevent increased intracranial pressure  - Monitor for seizure activity and implement precautions if appropriate      Outcome: Progressing  Goal: Remains free of injury related to seizures activity  Description: INTERVENTIONS  - Maintain airway, patient safety  and administer oxygen as ordered  - Monitor patient for seizure activity, document and report duration and description of seizure to physician/advanced practitioner  - If seizure occurs,  ensure patient safety during seizure  - Reorient patient post seizure  - Seizure pads on all 4 side rails  - Instruct patient/family to notify RN of any seizure activity including if an aura is experienced  - Instruct patient/family to call for assistance with activity based on nursing assessment  - Administer anti-seizure medications if ordered    Outcome: Progressing  Goal: Achieves maximal functionality and self care  Description: INTERVENTIONS  - Monitor swallowing and  airway patency with patient fatigue and changes in neurological status  - Encourage and assist patient to increase activity and self care.   - Encourage visually impaired, hearing impaired and aphasic patients to use assistive/communication devices  Outcome: Progressing     Problem: MUSCULOSKELETAL - ADULT  Goal: Maintain or return mobility to safest level of function  Description: INTERVENTIONS:  - Assess patient's ability to carry out ADLs; assess patient's baseline for ADL function and identify physical deficits which impact ability to perform ADLs (bathing, care of mouth/teeth, toileting, grooming, dressing, etc.)  - Assess/evaluate cause of self-care deficits   - Assess range of motion  - Assess patient's mobility  - Assess patient's need for assistive devices and provide as appropriate  - Encourage maximum independence but intervene and supervise when necessary  - Involve family in performance of ADLs  - Assess for home care needs following discharge   - Consider OT consult to assist with ADL evaluation and planning for discharge  - Provide patient education as appropriate  Outcome: Progressing  Goal: Maintain proper alignment of affected body part  Description: INTERVENTIONS:  - Support, maintain and protect limb and body alignment  - Provide patient/ family with appropriate education  Outcome: Progressing     Problem: PAIN - ADULT  Goal: Verbalizes/displays adequate comfort level or baseline comfort level  Description: Interventions:  - Encourage patient to monitor pain and request assistance  - Assess pain using appropriate pain scale  - Administer analgesics based on type and severity of pain and evaluate response  - Implement non-pharmacological measures as appropriate and evaluate response  - Consider cultural and social influences on pain and pain management  - Notify physician/advanced practitioner if interventions unsuccessful or patient reports new pain  Outcome: Progressing

## 2024-04-29 NOTE — ASSESSMENT & PLAN NOTE
Patient has known history of hypertension, no issues with blood pressure prior to transfer.  Home regimen: Amlodipine, losartan    Assessment: Hypertension, controlled.  Slightly above goal range    Plan:  - Continue close monitoring  - Continue amlodipine and losartan     - If patient's blood pressure continues to trend up, he does have room to increase his daily losartan

## 2024-04-29 NOTE — ASSESSMENT & PLAN NOTE
Patient was diagnosed with adenocarcinoma of the right lung in 2023; stage Stage JAY (cT3, cN1, cM1b)   He is s/p robotic converted to right Thoracotomy and right upper lobectomy on 9/1/2023.  He has also undergone bronchoscopy with EBUS at [facility] on 4/16/2024.  Initial pathology showing metastatic lung cell carcinoma in the most recent biopsies.  He follows with [facility] hematology oncology for ongoing cancer management and chemotherapy.    Assessment: Right lung adenocarcinoma with metastasis to brain and mediastinal lymph nodes    Plan:  - Patient planning for CT-SIM at [facility] today

## 2024-04-29 NOTE — PROGRESS NOTES
[facility]  Progress Note  Name: Paras Pitts I  MRN: [id number]  Unit/Bed#: [facility]PHP 732-01 I Date of Admission: 4/22/2024   Date of Service: 4/29/2024 I Hospital Day: 7    Assessment/Plan   * Tremors of nervous system  Assessment & Plan  Patient was originally admitted to the [facility] on 4/20/2024 after acute onset tremors of the right upper and lower extremity.  He does have a history of stroke with left-sided weakness and his new tremors were thought to be related to his previous neurologic injury.  Initial stroke workup was negative and neurology was consulted.  Imaging was obtained including MRI that showed no additional evidence of stroke/TIA. Neurology then began an evaluation for seizure like activity in this patient because of his history of brain metastasis and surgical resection of brain mets.  He was loaded with Keppra and was admitted for close monitoring.  Despite his initial AED dosing the patient's tremors continued.  At this point neurology increased his AED medication regimen with the addition of Vimpat to his twice daily Keppra. He was loaded with 200 mg Vimpat and will continue 100 mg twice daily. Neurology also recommended transfer to [facility] for video EEG monitoring and further titration of antiepileptic drugs.    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 Most recent staging: Stage JAY (cT3, cN1, cM1b)  He has undergone multiple rounds of chemotherapy and radiation and has undergone surgical resection for the metastatic brain lesion.  A PET scan on 3/25/2024 showed uptake in mediastinal lymph nodes compatible with metastasis.  Repeat MRI brain during current hospitalization showed stable  postsurgical changes and lack of new lesions.  He has been taking Decadron at home for vasogenic edema of the brain.    Patient now presenting with new seizure activity.  He has been evaluated at the [facility] by our neurology colleagues.  He has been started on antiepileptic drugs but has continued to have breakthrough seizures.  He is being transferred to [facility] for video EEG monitoring and additional AED titration.    Assessment: Metastatic adenocarcinoma of the lung with metastasis to the brain s/p surgical resection and SRS on daily Decadron    Plan:  - Continue Decadron at 4 mg/day  - Patient going for CT-SIM at [facility] today    Carcinoma of right lung (HCC)  Assessment & Plan  Patient was diagnosed with adenocarcinoma of the right lung in 2023; stage Stage JAY (cT3, cN1, cM1b)   He is s/p robotic converted to right Thoracotomy and right upper lobectomy on 9/1/2023.  He has also undergone bronchoscopy with EBUS at [facility] on 4/16/2024.  Initial pathology showing metastatic lung cell carcinoma in the most recent biopsies.  He follows with [facility] hematology oncology for ongoing cancer management and chemotherapy.    Assessment: Right lung adenocarcinoma with metastasis to brain and mediastinal lymph nodes    Plan:  - Patient planning for CT-SIM at [facility] today      Constipation by delayed colonic transit  Assessment & Plan  Patient without bowel movement for several days.  This has been an ongoing problem at home as well.  Standard bowel regimen not effective while hospitalized to date.    Assessment: Constipation, likely secondary to prolonged reduced mobility and recent medication changes.  Small bowel movement this morning, will try suppository.    Improving    Plan:  - Decrease MiraLAX to once daily  - Continue senna  - Dulcolax suppository as needed     Generalized weakness  Assessment & Plan  Patient presenting with generalized weakness in addition to  his left-sided tremors. LUE paralysis at b/l LLE near paralysis.    Assessment: weakness likely multifactorial in the setting of new onset seizures (postictal?) and metastatic lung cancer receiving chemotherapy and radiation    Plan:  - PT / OT consult  - Case management consult         History of CVA (cerebrovascular accident)  Assessment & Plan  Patient has a history of stroke and has left-sided weakness as a long-term sequelae.    Assessment: History of CVA with residual left-sided weakness    Plan:  - Continue statin         Hypercholesterolemia  Assessment & Plan  Continue home statin         Essential hypertension  Assessment & Plan  Patient has known history of hypertension, no issues with blood pressure prior to transfer.  Home regimen: Amlodipine, losartan    Assessment: Hypertension, controlled.  Slightly above goal range    Plan:  - Continue close monitoring  - Continue amlodipine and losartan     - If patient's blood pressure continues to trend up, he does have room to increase his daily losartan               Subjective:   Patient seen and examined at bedside this morning.  No acute events overnight.  Patient thinks that his left lower extremity is regaining strength at small increments.  No new complaints or symptoms this morning.    Objective:     Vitals: Blood pressure 148/88, pulse 71, temperature (!) 97.4 °F (36.3 °C), resp. rate 17, SpO2 95%.,There is no height or weight on file to calculate BMI.      Intake/Output Summary (Last 24 hours) at 4/29/2024 1151  Last data filed at 4/29/2024 0501  Gross per 24 hour   Intake --   Output 1200 ml   Net -1200 ml       Physical Exam:   Physical Exam  Constitutional:       General: He is not in acute distress.     Appearance: Normal appearance.   HENT:      Head: Normocephalic and atraumatic.      Right Ear: External ear normal.      Left Ear: External ear normal.      Nose: Nose normal.      Mouth/Throat:      Mouth: Mucous membranes are moist.   Eyes:       Extraocular Movements: Extraocular movements intact.   Cardiovascular:      Rate and Rhythm: Normal rate and regular rhythm.      Pulses: Normal pulses.      Heart sounds: No murmur heard.  Pulmonary:      Effort: Pulmonary effort is normal. No respiratory distress.      Breath sounds: Normal breath sounds.   Abdominal:      General: Bowel sounds are normal. There is distension.      Tenderness: There is no abdominal tenderness. There is no guarding.   Musculoskeletal:         General: No swelling or tenderness.      Cervical back: Normal range of motion.      Right lower leg: No edema.      Left lower leg: No edema.   Skin:     General: Skin is warm.   Neurological:      Mental Status: He is alert and oriented to person, place, and time.      Sensory: No sensory deficit.      Motor: Weakness present.      Comments: 3 out of 5 strength in the left upper extremity, 2 out of 5 strength in the left lower extremity   Psychiatric:         Mood and Affect: Mood normal.         Behavior: Behavior normal.         Invasive Devices       Central Venous Catheter Line  Duration             Port A Cath 04/27/23 Right Subclavian 367 days              Peripheral Intravenous Line  Duration             Peripheral IV 04/27/24 Distal;Right;Upper;Ventral (anterior) Arm 2 days                               Lab and other studies:  I have personally reviewed pertinent reports.       No results found for this or any previous visit (from the past 24 hour(s)).    DVT prophylaxis: DVT chemoprophylaxis - sqh or lovenox and SCDs  Diet: Carb controlled  Code Status: FULL CODE - Level 1  Dispo: Medically stable for discharge. Post-acute dispo pending  updates    Plan discussed with family medicine team.  Final plan pending attending attestation.    George Burnett MD

## 2024-04-29 NOTE — ASSESSMENT & PLAN NOTE
Patient was originally admitted to the [facility] on 4/20/2024 after acute onset tremors of the right upper and lower extremity.  He does have a history of stroke with left-sided weakness and his new tremors were thought to be related to his previous neurologic injury.  Initial stroke workup was negative and neurology was consulted.  Imaging was obtained including MRI that showed no additional evidence of stroke/TIA. Neurology then began an evaluation for seizure like activity in this patient because of his history of brain metastasis and surgical resection of brain mets.  He was loaded with Keppra and was admitted for close monitoring.  Despite his initial AED dosing the patient's tremors continued.  At this point neurology increased his AED medication regimen with the addition of Vimpat to his twice daily Keppra. He was loaded with 200 mg Vimpat and will continue 100 mg twice daily. Neurology also recommended transfer to [facility] for video EEG monitoring and further titration of antiepileptic drugs.    Assessment: New onset partial seizures, concern for status     Plan:  - Neurology consulted, AEDs optimized at this point  - Patient will require 4-week follow-up with epilepsy attending

## 2024-04-29 NOTE — OCCUPATIONAL THERAPY NOTE
Occupational Therapy Progress Note     Patient Name: Paras Pitts  Today's Date: 4/29/2024  Problem List  Principal Problem:    Tremors of nervous system  Active Problems:    Essential hypertension    Hypercholesterolemia    Metastatic adenocarcinoma (HCC)    Carcinoma of right lung (HCC)    History of CVA (cerebrovascular accident)    Generalized weakness    Seizure-like activity (HCC)    Constipation by delayed colonic transit     04/29/24 1116   OT Last Visit   OT Visit Date 04/29/24   Note Type   Note Type Treatment   Pain Assessment   Pain Assessment Tool 0-10   Pain Score No Pain   Restrictions/Precautions   Weight Bearing Precautions Per Order No   Other Precautions Fall Risk;Telemetry;Cognitive;Chair Alarm;Bed Alarm  (Left hemiparesis, left hemibody neglect, left UE motor delay)   Lifestyle   Autonomy I with ADL's/IaDL's, no AD with functional mobility +drives   Reciprocal Relationships spouse -works during the day   Service to Others works full time -self employed [employer]   Intrinsic Gratification working   ADL   Where Assessed Chair   Eating Assistance 5  Supervision/Setup   Eating Deficit Setup   Grooming Assistance 3  Moderate Assistance   Grooming Deficit Wash/dry hands;Wash/dry face  (S to wash face, mod a to wash right hand)   UB Bathing Assistance 2  Maximal Assistance   UB Bathing Deficit Right arm;Left arm;Buttocks   LB Bathing Assistance 3  Moderate Assistance   LB Bathing Deficit Right lower leg including foot;Left lower leg including foot;Buttocks   UB Dressing Assistance 3  Moderate Assistance   UB Dressing Deficit Thread LUE;Thread RUE   LB Dressing Assistance 2  Maximal Assistance   LB Dressing Deficit Don/doff R sock;Don/doff L sock   Toileting Assistance  2  Maximal Assistance   Bed Mobility   Supine to Sit 3 moderate assistance with increased time, HOB elevated and bed rail   Additional items Assist x 1   Sit to Supine 3  Moderate assistance   Additional items Assist x 2    Transfers   Sit to Stand 3  Moderate assistance   Additional items Assist x 2   Stand to Sit 3  Moderate assistance   Additional items Assist x 2   Stand pivot 3  Moderate assistance   Additional items Assist x 2  (bed<>chair with B/LUE HHA with left UE supported for protection stratgey 2* to hemiplegia, left knee blocked)   Therapeutic Exercise - ROM   UE-ROM Yes   ROM - Left Upper Extremities    L Shoulder AAROM;Flexion;Horizontal ABduction   L Elbow Elbow extension;Elbow flexion;AROM   L Wrist Wrist extension;Wrist flexion;AROM   L Hand Thumb;Index finger;Long finger;Ring finger;Little finger  (with issued soft tan theraputty with various grasp/pinch patterns provided visual handout for pt reference)   L Position Seated   L Weight/Reps/Sets 5x   LUE ROM Comment left UE hemiparesis -shoulder 2/5, elbow 2+/5,  3+/5   Cognition   Overall Cognitive Status Impaired   Arousal/Participation Alert;Responsive;Cooperative   Attention Attends with cues to redirect   Orientation Level Oriented X4   Memory Decreased recall of precautions;Decreased recall of recent events;Decreased short term memory   Following Commands Follows one step commands with increased time or repetition   Comments pt motivated for therapy, impaired sustained attention, distractible with room environement, verbal cues for task initation/completition, working memory with self care steps.   Perception   Perception Yes   Left Attention left UE neglect -verbal cues throughout session to incorporate with functional tasks   Perception Comments hand over hand assistance to wqash right UE   Activity Tolerance   Activity Tolerance Patient tolerated treatment well   Assessment   Assessment Patient participated in Skilled OT session this date with interventions consisting of self care tasks, functional transfers, left UE therapeutic exercises, HEP . Patient agreeable to OT treatment session, upon arrival patient was found supine in bed . Patient requiring  verbal cues for safety and verbal cues for correct technique. Patient continues to be functioning below baseline level, occupational performance remains limited secondary to factors listed above and increased risk for falls and injury.   From OT standpoint, recommendation at time of d/c would be max level I.  The patient's raw score on the AM-PAC Daily Activity Inpatient Short Form is 14. A raw score of less than 19 suggests the patient may benefit from discharge to post-acute rehabilitation services. Please refer to the recommendation of the Occupational Therapist for safe discharge planning.  Patient to benefit from continued Occupational Therapy treatment while in the hospital to address deficits as defined above and maximize level of functional independence with ADLs and functional mobility.   Plan   Treatment Interventions ADL retraining;Functional transfer training;UE strengthening/ROM;Endurance training;Cognitive reorientation;Patient/family training;Equipment evaluation/education;Compensatory technique education;Continued evaluation;Energy conservation;Activityengagement   Goal Expiration Date 05/07/24   OT Treatment Day 1   OT Frequency 2-3x/wk   Discharge Recommendation   Rehab Resource Intensity Level, OT I (Maximum Resource Intensity)   AM-PAC Daily Activity Inpatient   Lower Body Dressing 2   Bathing 2   Toileting 2   Upper Body Dressing 2   Grooming 3   Eating 3   Daily Activity Raw Score 14   Daily Activity Standardized Score (Calc for Raw Score >=11) 33.39   AM-PAC Applied Cognition Inpatient   Following a Speech/Presentation 2   Understanding Ordinary Conversation 4   Taking Medications 2   Remembering Where Things Are Placed or Put Away 3   Remembering List of 4-5 Errands 2   Taking Care of Complicated Tasks 1   Applied Cognition Raw Score 14   Applied Cognition Standardized Score 32.02   End of Consult   Education Provided Yes  (left UE HEP with issued visual hand outs for pt/family reference.)    Patient Position at End of Consult Supine;Bed/Chair alarm activated;All needs within reach  (left UE)   Zaynab Gupta OTR/L

## 2024-04-29 NOTE — RESTORATIVE TECHNICIAN NOTE
Restorative Technician Note      Patient Name: Paras Pitts     Note Type: Mobility  Patient Position Upon Consult: Bedside chair  Activity Performed: Ambulated; Dangled; Stood  Assistive Device: Other (Comment) (Assist x2)  Education Provided: Yes  Patient Position at End of Consult: Supine; All needs within reach; Bed/Chair alarm activated  Tomeka CORNEJO, Restorative Technician,

## 2024-04-29 NOTE — PLAN OF CARE
Problem: OCCUPATIONAL THERAPY ADULT  Goal: Performs self-care activities at highest level of function for planned discharge setting.  See evaluation for individualized goals.  Description: Treatment Interventions: ADL retraining, Functional transfer training, UE strengthening/ROM, Endurance training, Cognitive reorientation, Patient/family training, Equipment evaluation/education, Compensatory technique education, Energy conservation, Activityengagement, Continued evaluation          See flowsheet documentation for full assessment, interventions and recommendations.   Note: Limitation: Decreased ADL status, Decreased endurance, Decreased self-care trans, Decreased high-level ADLs, Decreased cognition, Decreased Safe judgement during ADL, Decreased UE strength, Decreased UE ROM, Visual deficit, Decreased fine motor control  Prognosis: Good  Assessment: Patient participated in Skilled OT session this date with interventions consisting of self care tasks, functional transfers, left UE therapeutic exercises, HEP . Patient agreeable to OT treatment session, upon arrival patient was found supine in bed . Patient requiring verbal cues for safety and verbal cues for correct technique. Patient continues to be functioning below baseline level, occupational performance remains limited secondary to factors listed above and increased risk for falls and injury.   From OT standpoint, recommendation at time of d/c would be max level I.  The patient's raw score on the -PAC Daily Activity Inpatient Short Form is 14. A raw score of less than 19 suggests the patient may benefit from discharge to post-acute rehabilitation services. Please refer to the recommendation of the Occupational Therapist for safe discharge planning.  Patient to benefit from continued Occupational Therapy treatment while in the hospital to address deficits as defined above and maximize level of functional independence with ADLs and functional mobility.      Rehab Resource Intensity Level, OT: I (Maximum Resource Intensity)

## 2024-04-30 ENCOUNTER — TELEPHONE (OUTPATIENT)
Dept: CARDIAC SURGERY | Facility: CLINIC | Age: 69
End: 2024-04-30

## 2024-04-30 PROCEDURE — 97112 NEUROMUSCULAR REEDUCATION: CPT

## 2024-04-30 PROCEDURE — 99233 SBSQ HOSP IP/OBS HIGH 50: CPT | Performed by: FAMILY MEDICINE

## 2024-04-30 PROCEDURE — 97116 GAIT TRAINING THERAPY: CPT

## 2024-04-30 RX ADMIN — AMLODIPINE BESYLATE 10 MG: 10 TABLET ORAL at 09:22

## 2024-04-30 RX ADMIN — CLOBAZAM 5 MG: 10 TABLET ORAL at 17:42

## 2024-04-30 RX ADMIN — LACOSAMIDE 200 MG: 200 TABLET, FILM COATED ORAL at 21:31

## 2024-04-30 RX ADMIN — LEVETIRACETAM 2000 MG: 750 TABLET, FILM COATED ORAL at 09:27

## 2024-04-30 RX ADMIN — CLOBAZAM 5 MG: 10 TABLET ORAL at 09:26

## 2024-04-30 RX ADMIN — PRAMIPEXOLE DIHYDROCHLORIDE 0.25 MG: 0.25 TABLET ORAL at 15:11

## 2024-04-30 RX ADMIN — PRAMIPEXOLE DIHYDROCHLORIDE 0.25 MG: 0.25 TABLET ORAL at 09:22

## 2024-04-30 RX ADMIN — ENOXAPARIN SODIUM 40 MG: 40 INJECTION SUBCUTANEOUS at 09:23

## 2024-04-30 RX ADMIN — PRAMIPEXOLE DIHYDROCHLORIDE 0.25 MG: 0.25 TABLET ORAL at 21:31

## 2024-04-30 RX ADMIN — LACOSAMIDE 200 MG: 200 TABLET, FILM COATED ORAL at 09:22

## 2024-04-30 RX ADMIN — POLYETHYLENE GLYCOL 3350 17 G: 17 POWDER, FOR SOLUTION ORAL at 09:22

## 2024-04-30 RX ADMIN — DEXAMETHASONE 4 MG: 4 TABLET ORAL at 09:22

## 2024-04-30 RX ADMIN — LOSARTAN POTASSIUM 50 MG: 50 TABLET, FILM COATED ORAL at 09:23

## 2024-04-30 RX ADMIN — SENNOSIDES 8.6 MG: 8.6 TABLET, FILM COATED ORAL at 21:31

## 2024-04-30 RX ADMIN — LEVETIRACETAM 2000 MG: 750 TABLET, FILM COATED ORAL at 21:31

## 2024-04-30 RX ADMIN — PANTOPRAZOLE SODIUM 40 MG: 40 TABLET, DELAYED RELEASE ORAL at 05:37

## 2024-04-30 RX ADMIN — ATORVASTATIN CALCIUM 40 MG: 40 TABLET, FILM COATED ORAL at 17:42

## 2024-04-30 NOTE — CASE MANAGEMENT
[facility] received request for authorization from Care Manager.  Authorization request submitted for: Acute Rehab  Facility Name: [facility]  NPI: [id number]  Facility MD: Ashley DePadua    NPI: [id number]  Authorization initiated by contacting insurance:  Deb[employer]  Via: [employer]   Clinicals submitted via: [employer] attachment   Pending Reference #: N/A    Care Manager notified: Ivy Vaughn    Updates to authorization status will be noted in chart. Please reach out to CM for updates on any clinical information.

## 2024-04-30 NOTE — ASSESSMENT & PLAN NOTE
Patient has known history of hypertension, no issues with blood pressure prior to transfer.  Home regimen: Amlodipine, losartan    Assessment: Hypertension, controlled.  Slightly above goal range    Plan:  - Continue close monitoring  - Continue amlodipine and losartan at current doses

## 2024-04-30 NOTE — ASSESSMENT & PLAN NOTE
Patient without bowel movement for several days.  This has been an ongoing problem at home as well.  Standard bowel regimen not effective while hospitalized to date.    Assessment: Constipation, likely secondary to prolonged reduced mobility and recent medication changes.  Small bowel movement this morning, will try suppository.    Improving    Plan:  - Continue MiraLAX once daily  - Continue senna  - Dulcolax suppository as needed

## 2024-04-30 NOTE — PROGRESS NOTES
[facility]  Progress Note  Name: Paras Pitts I  MRN: [id number]  Unit/Bed#: [facility] 732-01 I Date of Admission: 4/22/2024   Date of Service: 4/30/2024 I Hospital Day: 8    Assessment/Plan   * Tremors of nervous system  Assessment & Plan  Patient was originally admitted to the [facility] on 4/20/2024 after acute onset tremors of the right upper and lower extremity.  He does have a history of stroke with left-sided weakness and his new tremors were thought to be related to his previous neurologic injury.  Initial stroke workup was negative and neurology was consulted.  Imaging was obtained including MRI that showed no additional evidence of stroke/TIA. Neurology then began an evaluation for seizure like activity in this patient because of his history of brain metastasis and surgical resection of brain mets.  He was loaded with Keppra and was admitted for close monitoring.  Despite his initial AED dosing the patient's tremors continued.  At this point neurology increased his AED medication regimen with the addition of Vimpat to his twice daily Keppra. He was loaded with 200 mg Vimpat and will continue 100 mg twice daily. Neurology also recommended transfer to [facility] for video EEG monitoring and further titration of antiepileptic drugs.    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 Most recent staging: Stage JAY (cT3, cN1, cM1b)  He has undergone multiple rounds of chemotherapy and radiation and has undergone surgical resection for the metastatic brain lesion.  A PET scan on 3/25/2024 showed uptake in mediastinal lymph nodes compatible with metastasis.  Repeat MRI brain during current hospitalization showed stable  postsurgical changes and lack of new lesions.  He has been taking Decadron at home for vasogenic edema of the brain.    Patient now presenting with new seizure activity.  He has been evaluated at the [facility] by our neurology colleagues.  He has been started on antiepileptic drugs but has continued to have breakthrough seizures.  He is being transferred to [facility] for video EEG monitoring and additional AED titration.    Assessment: Metastatic adenocarcinoma of the lung with metastasis to the brain s/p surgical resection and SRS on daily Decadron    Plan:  - Continue Decadron at 4 mg/day  - Patient completed CT-SIM at [facility] yesterday.  Will begin further radiation treatment in 1 to 2 weeks per his radiation oncologist, Dr. Contreras.    Carcinoma of right lung (HCC)  Assessment & Plan  Patient was diagnosed with adenocarcinoma of the right lung in 2023; stage Stage JAY (cT3, cN1, cM1b)   He is s/p robotic converted to right Thoracotomy and right upper lobectomy on 9/1/2023.  He has also undergone bronchoscopy with EBUS at [facility] on 4/16/2024.  Initial pathology showing metastatic lung cell carcinoma in the most recent biopsies.  He follows with [facility] hematology oncology for ongoing cancer management and chemotherapy.    Assessment: Right lung adenocarcinoma with metastasis to brain and mediastinal lymph nodes    Plan:  - Continue follow-up with radiation oncology      Constipation by delayed colonic transit  Assessment & Plan  Patient without bowel movement for several days.  This has been an ongoing problem at home as well.  Standard bowel regimen not effective while hospitalized to date.    Assessment: Constipation, likely secondary to prolonged reduced mobility and recent medication changes.  Small bowel movement this morning, will try suppository.    Improving    Plan:  - Continue MiraLAX once daily  - Continue senna  - Dulcolax suppository as needed     Generalized  weakness  Assessment & Plan  Patient presenting with generalized weakness in addition to his left-sided tremors. LUE paralysis at b/l LLE near paralysis.    Assessment: weakness likely multifactorial in the setting of new onset seizures (postictal?) and metastatic lung cancer receiving chemotherapy and radiation    Plan:  - PT / OT consult  - Case management consult       - [facility] acceptance pending insurance approval    History of CVA (cerebrovascular accident)  Assessment & Plan  Patient has a history of stroke and has left-sided weakness as a long-term sequelae.    Assessment: History of CVA with residual left-sided weakness    Plan:  - Continue statin          Hypercholesterolemia  Assessment & Plan  Continue home statin         Essential hypertension  Assessment & Plan  Patient has known history of hypertension, no issues with blood pressure prior to transfer.  Home regimen: Amlodipine, losartan    Assessment: Hypertension, controlled.  Slightly above goal range    Plan:  - Continue close monitoring  - Continue amlodipine and losartan at current doses               Subjective:   Patient seen and examined at bedside this morning.  No acute events overnight.  Patient feels well this morning, continues to show signs of improvement in the left lower extremity especially left upper extremity.  Walked today, felt very good about that.  Otherwise no changes.    Objective:     Vitals: Blood pressure 151/88, pulse 82, temperature 98.2 °F (36.8 °C), resp. rate 17, SpO2 97%.,There is no height or weight on file to calculate BMI.      Intake/Output Summary (Last 24 hours) at 4/30/2024 1542  Last data filed at 4/30/2024 1300  Gross per 24 hour   Intake 480 ml   Output 875 ml   Net -395 ml       Physical Exam:   Physical Exam  Constitutional:       General: He is not in acute distress.     Appearance: Normal appearance.   HENT:      Head: Normocephalic and atraumatic.      Right Ear: External ear normal.      Left Ear: External  ear normal.      Nose: Nose normal.      Mouth/Throat:      Mouth: Mucous membranes are moist.   Eyes:      Extraocular Movements: Extraocular movements intact.   Cardiovascular:      Rate and Rhythm: Normal rate and regular rhythm.      Pulses: Normal pulses.      Heart sounds: No murmur heard.  Pulmonary:      Effort: Pulmonary effort is normal. No respiratory distress.      Breath sounds: Normal breath sounds.   Abdominal:      General: Bowel sounds are normal. There is distension.      Tenderness: There is no abdominal tenderness. There is no guarding.   Musculoskeletal:         General: No swelling or tenderness.      Cervical back: Normal range of motion.      Right lower leg: No edema.      Left lower leg: No edema.   Skin:     General: Skin is warm.   Neurological:      Mental Status: He is alert and oriented to person, place, and time.      Sensory: No sensory deficit.      Motor: Weakness present.      Comments: 4 out of 5 strength in the left upper extremity, 2 out of 5 strength in the left lower extremity   Psychiatric:         Mood and Affect: Mood normal.         Behavior: Behavior normal.         Invasive Devices       Central Venous Catheter Line  Duration             Port A Cath 04/27/23 Right Subclavian 369 days              Peripheral Intravenous Line  Duration             Peripheral IV 04/27/24 Distal;Right;Upper;Ventral (anterior) Arm 3 days                               Lab and other studies:  I have personally reviewed pertinent reports.       No results found for this or any previous visit (from the past 24 hour(s)).    DVT prophylaxis: DVT chemoprophylaxis - sqh or lovenox and SCDs  Diet: Carb controlled  Code Status: FULL CODE - Level 1  Dispo: Medically stable for discharge. Post-acute dispo pending CM updates    Plan discussed with family medicine team.  Final plan pending attending attestation.    George Burnett MD

## 2024-04-30 NOTE — CASE MANAGEMENT
Case Management Discharge Planning Note    Patient name Paras Pitts  Location [facility] 732/[facility] 732-01 MRN [id number]  : 1955 Date 2024       Current Admission Date: 2024  Current Admission Diagnosis:Tremors of nervous system   Patient Active Problem List    Diagnosis Date Noted    Constipation by delayed colonic transit 2024    Seizure-like activity (HCC) 2024    Generalized weakness 2024    Tremors of nervous system 2024    History of CVA (cerebrovascular accident) 2023    Chemotherapy-induced neutropenia (HCC) 2023    Encounter for central line care 2023    Carcinoma of right lung (HCC) 2023    Lung nodule 2023    Metastatic adenocarcinoma (HCC)     Hypercholesterolemia 2021    Essential hypertension 2020    Annual physical exam 2020    Negative depression screening 2020    Overweight (BMI 25.0-29.9) 2020      LOS (days): 8  Geometric Mean LOS (GMLOS) (days):   Days to GMLOS:     OBJECTIVE:  Risk of Unplanned Readmission Score: 16.32         Current admission status: Inpatient   Preferred Pharmacy:   [facility]  [place] [address]  Phone: [phone] Fax: [phone]    Primary Care Provider: Maikel Brower DO    Primary Insurance: BLUE CROSS  Secondary Insurance: MEDICARE    DISCHARGE DETAILS:    Other Referral/Resources/Interventions Provided:  Referral Comments: insurnace auth submitted for [facility]

## 2024-04-30 NOTE — ASSESSMENT & PLAN NOTE
Patient presenting with generalized weakness in addition to his left-sided tremors. LUE paralysis at b/l LLE near paralysis.    Assessment: weakness likely multifactorial in the setting of new onset seizures (postictal?) and metastatic lung cancer receiving chemotherapy and radiation    Plan:  - PT / OT consult  - Case management consult       - ARC acceptance pending insurance approval

## 2024-04-30 NOTE — QUICK NOTE
Patient seen and examined at bedside. Family at bedside. Patient appeared in good spirits. L arm strength is improving. L leg with limited movement but improved from previous. Patient denied any new complaints.

## 2024-04-30 NOTE — PHYSICAL THERAPY NOTE
Physical Therapy Progress Note     04/30/24 1105   PT Last Visit   PT Visit Date 04/30/24   Note Type   Note Type Treatment for insurance authorization   Pain Assessment   Pain Assessment Tool 0-10   Pain Score No Pain   Restrictions/Precautions   Other Precautions Chair Alarm;Bed Alarm;Fall Risk  (Alarm active post session.)   Subjective   Subjective The patient denies any pain, and he is eager to get moving.   Bed Mobility   Rolling R 3  Moderate assistance   Additional items Assist x 1;Increased time required;LE management   Supine to Sit 4  Minimal assistance   Additional items Assist x 1;Increased time required;HOB elevated;LE management   Transfers   Sit to Stand 4  Minimal assistance   Additional items Assist x 2;Increased time required;Verbal cues   Stand to Sit 4  Minimal assistance   Additional items Assist x 2;Increased time required;Verbal cues   Stand pivot 3  Moderate assistance   Additional items Assist x 1;Increased time required;Verbal cues   Ambulation/Elevation   Gait pattern Excessively slow;Step to;Short stride;Inconsistent andrae;Decreased foot clearance;L Foot drag;R Foot drag   Gait Assistance 3  Moderate assist   Additional items Assist x 2;Verbal cues;Tactile cues   Assistive Device Other (Comment)  (Right rail and left support at hip and knee.)   Distance 3 feet, 10 feet x 2.   Balance   Static Sitting Fair   Dynamic Sitting Fair -   Static Standing Poor +   Dynamic Standing Poor   Ambulatory Poor   Activity Tolerance   Activity Tolerance Patient tolerated treatment well   Nurse Made Aware FAYE Barajas.   Exercises   Knee AROM Long Arc Quad Sitting;10 reps;Left;AROM   Marching Sitting;Left;AAROM;20 reps   Assessment   Prognosis Good   Problem List Decreased strength;Decreased mobility;Impaired balance;Decreased endurance;Decreased cognition;Impaired judgement;Decreased safety awareness   Assessment The patient is demonstrating progress with improving balance, strength, and ambulatory  distance today. The patient was initially trialed with bilateral hand-hold assistance for the short transfer over to the chair. He had no overt buckling, and he tolerated stance well, and transitioned to utilize the hand-rail in the hallway for gait. With this the patient was able to maintain stance with support of his left peter-paretic side. He was able to maintain hip extension as well as knee extension with his leg slightly forward to facilitate this. Assistance was necessary in order to advance his LLE, but his weight-shifting improved during the session to more easily perform this manuever. His stride length progressed with each trial as well. While the patient did become fatigued, he responded very well to the treatment. He will certainly benefit from continued aggressive therapy programs to maximize his functional mobility. With discussion with the patient he is eager to progress to an ambulatory state. Pt. may benefit from a trial of a hemiwalker in the next few weeks as his overall strength, balance, and competence with upright mobility improves.   Barriers to Discharge Inaccessible home environment;Decreased caregiver support   Goals   Patient Goals To see what his limits truly are, and to be as independent and progressively mobile as he possibly can be.   STG Expiration Date 05/07/24   PT Treatment Day 2   Plan   Treatment/Interventions Functional transfer training;LE strengthening/ROM;Therapeutic exercise;Endurance training;Patient/family training;Gait training;Bed mobility   Progress Progressing toward goals   PT Frequency 3-5x/wk   Discharge Recommendation   Rehab Resource Intensity Level, PT I (Maximum Resource Intensity)   AM-PAC Basic Mobility Inpatient   Turning in Flat Bed Without Bedrails 2   Lying on Back to Sitting on Edge of Flat Bed Without Bedrails 3   Moving Bed to Chair 2   Standing Up From Chair Using Arms 2   Walk in Room 2   Climb 3-5 Stairs With Railing 1   Basic Mobility Inpatient Raw  Score 12   Basic Mobility Standardized Score 32.23   [facility] Highest Level Of Mobility   -[facility] Goal 4: Move to chair/commode   -HL Achieved 6: Walk 10 steps or more         An AM-PAC Basic Mobility raw score less than 16 suggests the patient may benefit from discharge to post-acute rehab services.    Victor Hugo Lew, PTA

## 2024-04-30 NOTE — ASSESSMENT & PLAN NOTE
History of metastatic lung adenocarcinoma w/ mets to brain. Most recent staging: Stage JAY (cT3, cN1, cM1b)  He has undergone multiple rounds of chemotherapy and radiation and has undergone surgical resection for the metastatic brain lesion.  A PET scan on 3/25/2024 showed uptake in mediastinal lymph nodes compatible with metastasis.  Repeat MRI brain during current hospitalization showed stable postsurgical changes and lack of new lesions.  He has been taking Decadron at home for vasogenic edema of the brain.    Patient now presenting with new seizure activity.  He has been evaluated at the [facility] by our neurology colleagues.  He has been started on antiepileptic drugs but has continued to have breakthrough seizures.  He is being transferred to [facility] for video EEG monitoring and additional AED titration.    Assessment: Metastatic adenocarcinoma of the lung with metastasis to the brain s/p surgical resection and SRS on daily Decadron    Plan:  - Continue Decadron at 4 mg/day  - Patient completed CT-SIM at [facility] yesterday.  Will begin further radiation treatment in 1 to 2 weeks per his radiation oncologist, Dr. Contreras.

## 2024-04-30 NOTE — RESTORATIVE TECHNICIAN NOTE
Restorative Technician Note      Patient Name: Paras Pitts     Note Type: Mobility  Patient Position Upon Consult: Supine  Activity Performed: Ambulated; Dangled; Stood  Assistive Device: Other (Comment) (Assist x2 to ambulate with chair follow. Assisted PTA Victor Hugo.)  Education Provided: Yes  Patient Position at End of Consult: Bedside chair; All needs within reach; Bed/Chair alarm activated      Tomeka CORNEJO, Restorative Technician,

## 2024-04-30 NOTE — RESTORATIVE TECHNICIAN NOTE
Restorative Technician Note      Patient Name: Paras Pitts     Note Type: Mobility  Patient Position Upon Consult: Bedside chair  Activity Performed: Ambulated; Dangled; Stood  Assistive Device: Other (Comment) (Assist x2)  Education Provided: Yes  Patient Position at End of Consult: Bedside chair; All needs within reach; Bed/Chair alarm activated    Tomeka CORNEJO, Restorative Technician,

## 2024-04-30 NOTE — PLAN OF CARE
Problem: PHYSICAL THERAPY ADULT  Goal: Performs mobility at highest level of function for planned discharge setting.  See evaluation for individualized goals.  Description: Treatment/Interventions: Functional transfer training, LE strengthening/ROM, Therapeutic exercise, Endurance training, Cognitive reorientation, Patient/family training, Equipment eval/education, Bed mobility, Gait training, Spoke to nursing, Spoke to case management, OT          See flowsheet documentation for full assessment, interventions and recommendations.  Outcome: Progressing  Note: Prognosis: Good  Problem List: Decreased strength, Decreased mobility, Impaired balance, Decreased endurance, Decreased cognition, Impaired judgement, Decreased safety awareness  Assessment: The patient is demonstrating progress with improving balance, strength, and ambulatory distance today. The patient was initially trialed with bilateral hand-hold assistance for the short transfer over to the chair. He had no overt buckling, and he tolerated stance well, and transitioned to utilize the hand-rail in the hallway for gait. With this the patient was able to maintain stance with support of his left peter-paretic side. He was able to maintain hip extension as well as knee extension with his leg slightly forward to facilitate this. Assistance was necessary in order to advance his LLE, but his weight-shifting improved during the session to more easily perform this manuever. His stride length progressed with each trial as well. While the patient did become fatigued, he responded very well to the treatment. He will certainly benefit from continued aggressive therapy programs to maximize his functional mobility. With discussion with the patient he is eager to progress to an ambulatory state. Pt. may benefit from a trial of a hemiwalker in the next few weeks as his overall strength, balance, and competence with upright mobility improves.  Barriers to Discharge:  Inaccessible home environment, Decreased caregiver support     Rehab Resource Intensity Level, PT: I (Maximum Resource Intensity)    See flowsheet documentation for full assessment.

## 2024-04-30 NOTE — ASSESSMENT & PLAN NOTE
Patient was originally admitted to the [facility] on 4/20/2024 after acute onset tremors of the right upper and lower extremity.  He does have a history of stroke with left-sided weakness and his new tremors were thought to be related to his previous neurologic injury.  Initial stroke workup was negative and neurology was consulted.  Imaging was obtained including MRI that showed no additional evidence of stroke/TIA. Neurology then began an evaluation for seizure like activity in this patient because of his history of brain metastasis and surgical resection of brain mets.  He was loaded with Keppra and was admitted for close monitoring.  Despite his initial AED dosing the patient's tremors continued.  At this point neurology increased his AED medication regimen with the addition of Vimpat to his twice daily Keppra. He was loaded with 200 mg Vimpat and will continue 100 mg twice daily. Neurology also recommended transfer to [facility] for video EEG monitoring and further titration of antiepileptic drugs.    Assessment: New onset partial seizures, concern for status     Plan:  - Neurology consulted, AEDs optimized at this point  - Patient will require 4-week follow-up with epilepsy attending

## 2024-04-30 NOTE — QUICK NOTE
Patient seen and evaluated at bedside for evening rounds. He states he is doing well over all and has no questions or concerns at this time. Patient states that he was able to walk in the hallway between two rooms today! Congratulated patient on progress. Patient awaiting insurance auth for [facility].

## 2024-04-30 NOTE — ASSESSMENT & PLAN NOTE
Patient was diagnosed with adenocarcinoma of the right lung in 2023; stage Stage JAY (cT3, cN1, cM1b)   He is s/p robotic converted to right Thoracotomy and right upper lobectomy on 9/1/2023.  He has also undergone bronchoscopy with EBUS at [facility] on 4/16/2024.  Initial pathology showing metastatic lung cell carcinoma in the most recent biopsies.  He follows with [facility] oncology for ongoing cancer management and chemotherapy.    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

## 2024-05-01 ENCOUNTER — HOSPITAL ENCOUNTER (INPATIENT)
Facility: HOSPITAL | Age: 69
LOS: 23 days | Discharge: HOME | End: 2024-05-24
Attending: FAMILY MEDICINE | Admitting: FAMILY MEDICINE
Payer: COMMERCIAL

## 2024-05-01 VITALS
OXYGEN SATURATION: 96 % | HEART RATE: 92 BPM | SYSTOLIC BLOOD PRESSURE: 140 MMHG | DIASTOLIC BLOOD PRESSURE: 87 MMHG | RESPIRATION RATE: 16 BRPM | TEMPERATURE: 98.1 F

## 2024-05-01 DIAGNOSIS — G93.89 FRONTAL MASS OF BRAIN: ICD-10-CM

## 2024-05-01 DIAGNOSIS — G81.94 LEFT HEMIPARESIS (HCC): ICD-10-CM

## 2024-05-01 DIAGNOSIS — R25.1 TREMORS OF NERVOUS SYSTEM: ICD-10-CM

## 2024-05-01 DIAGNOSIS — I10 ESSENTIAL HYPERTENSION: ICD-10-CM

## 2024-05-01 DIAGNOSIS — D70.1 CHEMOTHERAPY-INDUCED NEUTROPENIA (HCC): ICD-10-CM

## 2024-05-01 DIAGNOSIS — R06.02 SHORTNESS OF BREATH: ICD-10-CM

## 2024-05-01 DIAGNOSIS — R41.89 COGNITIVE IMPAIRMENT: ICD-10-CM

## 2024-05-01 DIAGNOSIS — I10 HYPERTENSION: ICD-10-CM

## 2024-05-01 DIAGNOSIS — G40.909 SEIZURE DISORDER (HCC): Primary | ICD-10-CM

## 2024-05-01 DIAGNOSIS — C79.9 METASTATIC ADENOCARCINOMA (HCC): ICD-10-CM

## 2024-05-01 DIAGNOSIS — G93.89 BRAIN MASS: ICD-10-CM

## 2024-05-01 DIAGNOSIS — R53.1 GENERALIZED WEAKNESS: ICD-10-CM

## 2024-05-01 DIAGNOSIS — T45.1X5A CHEMOTHERAPY-INDUCED NEUTROPENIA (HCC): ICD-10-CM

## 2024-05-01 DIAGNOSIS — C79.31 METASTASIS TO BRAIN (HCC): ICD-10-CM

## 2024-05-01 DIAGNOSIS — R05.9 COUGH: ICD-10-CM

## 2024-05-01 DIAGNOSIS — C34.91 CARCINOMA OF RIGHT LUNG (HCC): ICD-10-CM

## 2024-05-01 DIAGNOSIS — E78.00 HYPERCHOLESTEROLEMIA: ICD-10-CM

## 2024-05-01 DIAGNOSIS — Z76.0 MEDICATION REFILL: ICD-10-CM

## 2024-05-01 PROCEDURE — 99239 HOSP IP/OBS DSCHRG MGMT >30: CPT | Performed by: FAMILY MEDICINE

## 2024-05-01 PROCEDURE — 97112 NEUROMUSCULAR REEDUCATION: CPT

## 2024-05-01 PROCEDURE — 97530 THERAPEUTIC ACTIVITIES: CPT

## 2024-05-01 PROCEDURE — 97110 THERAPEUTIC EXERCISES: CPT

## 2024-05-01 PROCEDURE — NC001 PR NO CHARGE: Performed by: PHYSICAL MEDICINE & REHABILITATION

## 2024-05-01 RX ORDER — MAGNESIUM HYDROXIDE/ALUMINUM HYDROXICE/SIMETHICONE 120; 1200; 1200 MG/30ML; MG/30ML; MG/30ML
30 SUSPENSION ORAL EVERY 6 HOURS PRN
Status: DISCONTINUED | OUTPATIENT
Start: 2024-05-01 | End: 2024-05-24 | Stop reason: HOSPADM

## 2024-05-01 RX ORDER — LEVETIRACETAM 500 MG/1
2000 TABLET ORAL EVERY 12 HOURS SCHEDULED
Status: DISCONTINUED | OUTPATIENT
Start: 2024-05-01 | End: 2024-05-24 | Stop reason: HOSPADM

## 2024-05-01 RX ORDER — DEXAMETHASONE 4 MG/1
4 TABLET ORAL DAILY
Status: DISCONTINUED | OUTPATIENT
Start: 2024-05-02 | End: 2024-05-15

## 2024-05-01 RX ORDER — BISACODYL 10 MG
10 SUPPOSITORY, RECTAL RECTAL DAILY PRN
Status: DISCONTINUED | OUTPATIENT
Start: 2024-05-01 | End: 2024-05-11

## 2024-05-01 RX ORDER — ATORVASTATIN CALCIUM 40 MG/1
40 TABLET, FILM COATED ORAL
Status: DISCONTINUED | OUTPATIENT
Start: 2024-05-01 | End: 2024-05-24 | Stop reason: HOSPADM

## 2024-05-01 RX ORDER — CLOBAZAM 10 MG/1
TABLET ORAL
Status: COMPLETED
Start: 2024-05-01 | End: 2024-05-02

## 2024-05-01 RX ORDER — LOSARTAN POTASSIUM 50 MG/1
50 TABLET ORAL DAILY
Status: DISCONTINUED | OUTPATIENT
Start: 2024-05-02 | End: 2024-05-24 | Stop reason: HOSPADM

## 2024-05-01 RX ORDER — PRAMIPEXOLE DIHYDROCHLORIDE 0.12 MG/1
0.25 TABLET ORAL 3 TIMES DAILY
Status: DISCONTINUED | OUTPATIENT
Start: 2024-05-01 | End: 2024-05-24 | Stop reason: HOSPADM

## 2024-05-01 RX ORDER — ONDANSETRON 4 MG/1
4 TABLET, ORALLY DISINTEGRATING ORAL EVERY 6 HOURS PRN
Status: DISCONTINUED | OUTPATIENT
Start: 2024-05-01 | End: 2024-05-24 | Stop reason: HOSPADM

## 2024-05-01 RX ORDER — SENNOSIDES 8.6 MG
1 TABLET ORAL
Status: DISCONTINUED | OUTPATIENT
Start: 2024-05-01 | End: 2024-05-02

## 2024-05-01 RX ORDER — CLOBAZAM 10 MG/1
5 TABLET ORAL 2 TIMES DAILY
Status: DISCONTINUED | OUTPATIENT
Start: 2024-05-01 | End: 2024-05-24 | Stop reason: HOSPADM

## 2024-05-01 RX ORDER — SENNOSIDES 8.6 MG
8.6 TABLET ORAL
Status: ON HOLD
Start: 2024-05-01

## 2024-05-01 RX ORDER — PRAMIPEXOLE DIHYDROCHLORIDE 0.25 MG/1
0.25 TABLET ORAL 3 TIMES DAILY
Status: ON HOLD
Start: 2024-05-01

## 2024-05-01 RX ORDER — ACETAMINOPHEN 325 MG/1
975 TABLET ORAL EVERY 8 HOURS PRN
Status: DISCONTINUED | OUTPATIENT
Start: 2024-05-01 | End: 2024-05-24 | Stop reason: HOSPADM

## 2024-05-01 RX ORDER — ENOXAPARIN SODIUM 100 MG/ML
40 INJECTION SUBCUTANEOUS DAILY
Status: DISCONTINUED | OUTPATIENT
Start: 2024-05-02 | End: 2024-05-08

## 2024-05-01 RX ORDER — POLYETHYLENE GLYCOL 3350 17 G/17G
17 POWDER, FOR SOLUTION ORAL DAILY
Status: ON HOLD
Start: 2024-05-02

## 2024-05-01 RX ORDER — DEXAMETHASONE 4 MG/1
4 TABLET ORAL DAILY
Status: ON HOLD
Start: 2024-05-02

## 2024-05-01 RX ORDER — LEVETIRACETAM 1000 MG/1
2000 TABLET ORAL EVERY 12 HOURS SCHEDULED
Status: ON HOLD
Start: 2024-05-01 | End: 2024-05-31

## 2024-05-01 RX ORDER — PANTOPRAZOLE SODIUM 40 MG/1
40 TABLET, DELAYED RELEASE ORAL
Status: DISCONTINUED | OUTPATIENT
Start: 2024-05-02 | End: 2024-05-24 | Stop reason: HOSPADM

## 2024-05-01 RX ORDER — AMLODIPINE BESYLATE 10 MG/1
10 TABLET ORAL DAILY
Status: DISCONTINUED | OUTPATIENT
Start: 2024-05-02 | End: 2024-05-24 | Stop reason: HOSPADM

## 2024-05-01 RX ORDER — LACOSAMIDE 200 MG/1
200 TABLET ORAL EVERY 12 HOURS SCHEDULED
Status: ON HOLD
Start: 2024-05-01 | End: 2024-05-11

## 2024-05-01 RX ORDER — POLYETHYLENE GLYCOL 3350 17 G/17G
17 POWDER, FOR SOLUTION ORAL DAILY
Status: DISCONTINUED | OUTPATIENT
Start: 2024-05-02 | End: 2024-05-02

## 2024-05-01 RX ORDER — CLOBAZAM 10 MG/1
5 TABLET ORAL 2 TIMES DAILY
Status: ON HOLD
Start: 2024-05-01 | End: 2024-05-11

## 2024-05-01 RX ADMIN — LEVETIRACETAM 2000 MG: 500 TABLET, FILM COATED ORAL at 19:09

## 2024-05-01 RX ADMIN — DEXAMETHASONE 4 MG: 4 TABLET ORAL at 09:45

## 2024-05-01 RX ADMIN — LOSARTAN POTASSIUM 50 MG: 50 TABLET, FILM COATED ORAL at 09:45

## 2024-05-01 RX ADMIN — ENOXAPARIN SODIUM 40 MG: 40 INJECTION SUBCUTANEOUS at 09:44

## 2024-05-01 RX ADMIN — LACOSAMIDE 200 MG: 150 TABLET ORAL at 21:36

## 2024-05-01 RX ADMIN — PRAMIPEXOLE DIHYDROCHLORIDE 0.25 MG: 0.12 TABLET ORAL at 21:36

## 2024-05-01 RX ADMIN — CLOBAZAM 5 MG: 10 TABLET ORAL at 19:08

## 2024-05-01 RX ADMIN — AMLODIPINE BESYLATE 10 MG: 10 TABLET ORAL at 09:45

## 2024-05-01 RX ADMIN — LEVETIRACETAM 2000 MG: 750 TABLET, FILM COATED ORAL at 09:45

## 2024-05-01 RX ADMIN — PANTOPRAZOLE SODIUM 40 MG: 40 TABLET, DELAYED RELEASE ORAL at 05:48

## 2024-05-01 RX ADMIN — CLOBAZAM 5 MG: 10 TABLET ORAL at 09:45

## 2024-05-01 RX ADMIN — ATORVASTATIN CALCIUM 40 MG: 40 TABLET, FILM COATED ORAL at 19:09

## 2024-05-01 RX ADMIN — LACOSAMIDE 200 MG: 200 TABLET, FILM COATED ORAL at 09:45

## 2024-05-01 RX ADMIN — PRAMIPEXOLE DIHYDROCHLORIDE 0.25 MG: 0.25 TABLET ORAL at 15:07

## 2024-05-01 RX ADMIN — PRAMIPEXOLE DIHYDROCHLORIDE 0.25 MG: 0.25 TABLET ORAL at 09:45

## 2024-05-01 NOTE — CASE MANAGEMENT
Case Management Discharge Planning Note    Patient name Paras Pitts  Location [facility] 732/[facility] 732-01 MRN [id number]  : 1955 Date 2024       Current Admission Date: 2024  Current Admission Diagnosis:Tremors of nervous system   Patient Active Problem List    Diagnosis Date Noted    Constipation by delayed colonic transit 2024    Seizure-like activity (HCC) 2024    Generalized weakness 2024    Tremors of nervous system 2024    History of CVA (cerebrovascular accident) 2023    Chemotherapy-induced neutropenia (HCC) 2023    Encounter for central line care 2023    Carcinoma of right lung (HCC) 2023    Lung nodule 2023    Metastatic adenocarcinoma (HCC)     Hypercholesterolemia 2021    Essential hypertension 2020    Annual physical exam 2020    Negative depression screening 2020    Overweight (BMI 25.0-29.9) 2020      LOS (days): 9  Geometric Mean LOS (GMLOS) (days):   Days to GMLOS:     OBJECTIVE:  Risk of Unplanned Readmission Score: 17.81         Current admission status: Inpatient   Preferred Pharmacy:   [facility] - [place] [address]  [place] [address]  Phone: [phone] Fax: [phone]    Primary Care Provider: Maikel Brower DO    Primary Insurance: BLUE CROSS  Secondary Insurance: MEDICARE    DISCHARGE DETAILS:       Freedom of Choice: Yes  Comments - Freedom of Choice: Family preferance is [facility]  CM contacted family/caregiver?: Yes  Were Treatment Team discharge recommendations reviewed with patient/caregiver?: Yes  Did patient/caregiver verbalize understanding of patient care needs?: Yes  Were patient/caregiver advised of the risks associated with not following Treatment Team discharge recommendations?: Yes    Contacts  Patient Contacts: Tiara Kelly  Relationship to Patient:: Family (spouse)  Contact Method: Phone  Phone Number:  [phone]  Reason/Outcome: Continuity of Care, Emergency Contact, Discharge Planning    Requested Home Health Care         Is the patient interested in HHC at discharge?: No    DME Referral Provided  Referral made for DME?: No    Other Referral/Resources/Interventions Provided:  Interventions: Acute Rehab    Would you like to participate in our [facility] service program?  : No - Declined    Treatment Team Recommendation: Acute Rehab  Discharge Destination Plan:: Acute Rehab           Transported by (Company and Unit #): [facility] [phone]        Additional Comments: Pt will discharge to [facility]    Accepting Facility Name, City & State : [facility]  Receiving Facility/Agency Phone Number: [phone]  Facility/Agency Fax Number: [phone]

## 2024-05-01 NOTE — OCCUPATIONAL THERAPY NOTE
Occupational Therapy Progress Note     Patient Name: Paras Pitts  Today's Date: 5/1/2024  Problem List  Principal Problem:    Tremors of nervous system  Active Problems:    Essential hypertension    Hypercholesterolemia    Metastatic adenocarcinoma (HCC)    Carcinoma of right lung (HCC)    History of CVA (cerebrovascular accident)    Generalized weakness    Seizure-like activity (HCC)    Constipation by delayed colonic transit     05/01/24 1001   OT Last Visit   OT Visit Date 05/01/24   Note Type   Note Type Treatment   Pain Assessment   Pain Assessment Tool 0-10   Pain Score No Pain   Restrictions/Precautions   Weight Bearing Precautions Per Order No   Other Precautions Cognitive;Chair Alarm;Bed Alarm;Fall Risk;Telemetry  (right hemiparesis, right UE inattention with FMC/GMC deficits)   Lifestyle   Autonomy I with ADL's/IaDL's, no AD with functional mobility +drives   Reciprocal Relationships spouse -works during the day   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   Intrinsic Gratification Working -drawing/painting   ADL   Where Assessed Chair   Eating Assistance 5  Supervision/Setup   Eating Deficit Setup;Beverage management  (with lunch tray)   ROM - Left Upper Extremities    L Shoulder AAROM;Flexion;ABduction;External rotation;Internal rotation   L Elbow AROM;Elbow flexion;Elbow extension   L Wrist AROM;Wrist flexion;Wrist extension   L Hand Thumb;Index finger;Long finger;Ring finger;Little finger;Prolonged stretch;AAROM   L Position Seated   L Weight/Reps/Sets 5-10x   LUE ROM Comment left UE hemiparesis with inattention~ pt engaged in connect four game on tabletop with tripod/pincher grasp (using table to assist) and shoulder and elbow flexion  +hand over hand assistance to place game piece in slot with increased time to release piece. Pt engaged in PVP placement in 4 connection piece with increased time /Hand over hand assistance to apply with gross grasp.   Neuromuscular Education    Functional Movement Patterns Connect four, grasping post cards, PVC pipe placement   Coordination   Gross Motor left UE impaired   Fine Motor left UE impaired   Cognition   Overall Cognitive Status (S)  Impaired   Arousal/Participation Alert;Responsive;Cooperative   Attention Attends with cues to redirect   Orientation Level Oriented X4   Memory Decreased recall of precautions;Decreased recall of recent events;Decreased short term memory   Following Commands Follows one step commands with increased time or repetition   Comments pt pleasant and cooperative, oriented, slow processing/response time, impaired STM and problem solving with connect four game (similar errors in game solutions to win)   Perception   Left Attention left UE inattention, verbal cues to utilize, improving.   Perception Comments hand over hand assistance with connect four game/PVC pipe application.   Activity Tolerance   Activity Tolerance Patient tolerated treatment well   Assessment   Assessment Patient participated in Skilled OT session this date with interventions consisting of self care tasks, left UE therapeutic exercises with connect four game, pvc pipe application, FMC/GMC engagement with functional tasks. . Patient agreeable to OT treatment session, upon arrival patient was found seated OOB to Chair.  In comparison to previous session, patient with improvements in left UE strength . Patient requiring verbal cues for safety, verbal cues for correct technique, verbal cues for pacing thru activity steps, and cognitive assistance to anticipate next step. Patient continues to be functioning below baseline level, occupational performance remains limited secondary to factors listed above and increased risk for falls and injury.   From OT standpoint, recommendation at time of d/c would be max level I.  The patient's raw score on the -PAC Daily Activity Inpatient Short Form is 14. A raw score of less than 19 suggests the patient may benefit  from discharge to post-acute rehabilitation services. Please refer to the recommendation of the Occupational Therapist for safe discharge planning.   Patient to benefit from continued Occupational Therapy treatment while in the hospital to address deficits as defined above and maximize level of functional independence with ADLs and functional mobility.   Plan   Treatment Interventions ADL retraining;Functional transfer training;UE strengthening/ROM;Endurance training;Cognitive reorientation;Patient/family training;Equipment evaluation/education;Fine motor coordination activities;Compensatory technique education;Continued evaluation;Energy conservation;Activityengagement   Goal Expiration Date 05/07/24   OT Treatment Day 2   OT Frequency 3-5x/wk   Discharge Recommendation   Rehab Resource Intensity Level, OT I (Maximum Resource Intensity)   AM-PAC Daily Activity Inpatient   Lower Body Dressing 2   Bathing 2   Toileting 2   Upper Body Dressing 2   Grooming 3   Eating 3   Daily Activity Raw Score 14   Daily Activity Standardized Score (Calc for Raw Score >=11) 33.39     Zaynab Gupta OTR/L

## 2024-05-01 NOTE — ARC ADMISSION
Noted per DSC and CM that insurance has approved patient for inpatient acute rehab, with auth #: [id number].    Patient will admit to [facility] room 213-1 with transport time TBD per CM. Report can be called to (P): [phone]. CM has been updated.

## 2024-05-01 NOTE — PLAN OF CARE
Problem: Prexisting or High Potential for Compromised Skin Integrity  Goal: Skin integrity is maintained or improved  Description: INTERVENTIONS:  - Identify patients at risk for skin breakdown  - Assess and monitor skin integrity  - Assess and monitor nutrition and hydration status  - Monitor labs   - Assess for incontinence   - Turn and reposition patient  - Assist with mobility/ambulation  - Relieve pressure over bony prominences  - Avoid friction and shearing  - Provide appropriate hygiene as needed including keeping skin clean and dry  - Evaluate need for skin moisturizer/barrier cream  - Collaborate with interdisciplinary team   - Patient/family teaching  - Consider wound care consult   Outcome: Progressing     Problem: PAIN - ADULT  Goal: Verbalizes/displays adequate comfort level or baseline comfort level  Description: Interventions:  - Encourage patient to monitor pain and request assistance  - Assess pain using appropriate pain scale  - Administer analgesics based on type and severity of pain and evaluate response  - Implement non-pharmacological measures as appropriate and evaluate response  - Consider cultural and social influences on pain and pain management  - Notify physician/advanced practitioner if interventions unsuccessful or patient reports new pain  Outcome: Progressing     Problem: INFECTION - ADULT  Goal: Absence or prevention of progression during hospitalization  Description: INTERVENTIONS:  - Assess and monitor for signs and symptoms of infection  - Monitor lab/diagnostic results  - Monitor all insertion sites, i.e. indwelling lines, tubes, and drains  - Monitor endotracheal if appropriate and nasal secretions for changes in amount and color  - [place] appropriate cooling/warming therapies per order  - Administer medications as ordered  - Instruct and encourage patient and family to use good hand hygiene technique  - Identify and instruct in appropriate isolation precautions for  identified infection/condition  Outcome: Progressing  Goal: Absence of fever/infection during neutropenic period  Description: INTERVENTIONS:  - Monitor WBC    Outcome: Progressing     Goal: Maintain or return to baseline ADL function  Description: INTERVENTIONS:  -  Assess patient's ability to carry out ADLs; assess patient's baseline for ADL function and identify physical deficits which impact ability to perform ADLs (bathing, care of mouth/teeth, toileting, grooming, dressing, etc.)  - Assess/evaluate cause of self-care deficits   - Assess range of motion  - Assess patient's mobility; develop plan if impaired  - Assess patient's need for assistive devices and provide as appropriate  - Encourage maximum independence but intervene and supervise when necessary  - Involve family in performance of ADLs  - Assess for home care needs following discharge   - Consider OT consult to assist with ADL evaluation and planning for discharge  - Provide patient education as appropriate  Outcome: Progressing       Problem: DISCHARGE PLANNING  Goal: Discharge to home or other facility with appropriate resources  Description: INTERVENTIONS:  - Identify barriers to discharge w/patient and caregiver  - Arrange for needed discharge resources and transportation as appropriate  - Identify discharge learning needs (meds, wound care, etc.)  - Arrange for interpretive services to assist at discharge as needed  - Refer to Case Management Department for coordinating discharge planning if the patient needs post-hospital services based on physician/advanced practitioner order or complex needs related to functional status, cognitive ability, or social support system  Outcome: Progressing

## 2024-05-01 NOTE — CASE MANAGEMENT
[facility] has received APPROVED authorization.  Insurance:   [employer]  Authorization received for: Acute Rehab  Facility:  [facility]    Authorization #: [id number]  Start of Care: 4/30  Next Review Date: 5/06  Continued Stay Care Coordinator:  none given  Submit next review to: Call [phone]     Care Manager notified: Ivy Vaughn    Please reach out to CM for updates on any clinical information.

## 2024-05-01 NOTE — PHYSICAL THERAPY NOTE
PHYSICAL THERAPY NOTE          Patient Name: Paras Pitts  Today's Date: 5/1/2024 05/01/24 1322   PT Last Visit   PT Visit Date 05/01/24   Note Type   Note Type Treatment   Pain Assessment   Pain Assessment Tool 0-10   Pain Score No Pain   Restrictions/Precautions   Weight Bearing Precautions Per Order No   Other Precautions Cognitive;Chair Alarm;Bed Alarm;Multiple lines;Telemetry;Fall Risk  (L hemiparesis)   General   Chart Reviewed Yes   Response to Previous Treatment Patient with no complaints from previous session.   Family/Caregiver Present No   Cognition   Overall Cognitive Status Impaired   Arousal/Participation Cooperative   Attention Attends with cues to redirect   Orientation Level Oriented X4   Following Commands Follows one step commands with increased time or repetition   Comments very pleasant   Subjective   Subjective requesting to use bathroom   Bed Mobility   Supine to Sit Unable to assess   Sit to Supine Unable to assess   Transfers   Sit to Stand 3  Moderate assistance   Additional items Assist x 2;Increased time required;Verbal cues   Stand to Sit 3  Moderate assistance   Additional items Assist x 2;Increased time required;Verbal cues   Stand pivot 3  Moderate assistance   Additional items Assist x 2;Increased time required;Verbal cues   Toilet transfer 3  Moderate assistance   Additional items Assist x 2;Increased time required;Verbal cues;Commode   Additional Comments STS x2, SPT x2   Balance   Static Sitting Fair   Dynamic Sitting Fair -   Static Standing Poor   Dynamic Standing Poor -   Endurance Deficit   Endurance Deficit Yes   Endurance Deficit Description fatigue   Activity Tolerance   Activity Tolerance Patient tolerated treatment well   Medical Staff Made Aware PCA   Assessment   Prognosis Good   Problem List Decreased strength;Decreased endurance;Impaired balance;Decreased mobility;Decreased  cognition;Impaired judgement;Decreased safety awareness;Impaired tone   Assessment Pt agreeable to participate in PT session. Pt performed functional mobility as outlined above. Continues to demonstrate L sided weakness with decreased ability to take lateral steps to commode. Pt left seated in chair with chair alarm, call bell, phone, and all personal needs within reach. Pt will continue to benefit from skilled acute care PT to further address their functional mobility limitations.   Barriers to Discharge Decreased caregiver support;Inaccessible home environment   Goals   Patient Goals to go to rehab   STG Expiration Date 05/07/24   PT Treatment Day 3   Plan   Treatment/Interventions Functional transfer training;LE strengthening/ROM;Therapeutic exercise;Endurance training;Patient/family training;Cognitive reorientation;Equipment eval/education;Bed mobility;Gait training;Spoke to nursing;Spoke to case management;OT   Progress Progressing toward goals   PT Frequency 3-5x/wk   Discharge Recommendation   Rehab Resource Intensity Level, PT I (Maximum Resource Intensity)   AM-PAC Basic Mobility Inpatient   Turning in Flat Bed Without Bedrails 2   Lying on Back to Sitting on Edge of Flat Bed Without Bedrails 2   Moving Bed to Chair 2   Standing Up From Chair Using Arms 2   Walk in Room 1   Climb 3-5 Stairs With Railing 1   Basic Mobility Inpatient Raw Score 10   [facility] Highest Level Of Mobility   -HLM Goal 4: Move to chair/commode   -HLM Achieved 5: Stand (1 or more minutes)     Arben Cox, PT, DPT

## 2024-05-01 NOTE — PLAN OF CARE
Problem: PHYSICAL THERAPY ADULT  Goal: Performs mobility at highest level of function for planned discharge setting.  See evaluation for individualized goals.  Description: Treatment/Interventions: Functional transfer training, LE strengthening/ROM, Therapeutic exercise, Endurance training, Cognitive reorientation, Patient/family training, Equipment eval/education, Bed mobility, Gait training, Spoke to nursing, Spoke to case management, OT          See flowsheet documentation for full assessment, interventions and recommendations.  Outcome: Progressing  Note: Prognosis: Good  Problem List: Decreased strength, Decreased endurance, Impaired balance, Decreased mobility, Decreased cognition, Impaired judgement, Decreased safety awareness, Impaired tone  Assessment: Pt agreeable to participate in PT session. Pt performed functional mobility as outlined above. Continues to demonstrate L sided weakness with decreased ability to take lateral steps to commode. Pt left seated in chair with chair alarm, call bell, phone, and all personal needs within reach. Pt will continue to benefit from skilled acute care PT to further address their functional mobility limitations.  Barriers to Discharge: Decreased caregiver support, Inaccessible home environment     Rehab Resource Intensity Level, PT: I (Maximum Resource Intensity)    See flowsheet documentation for full assessment.

## 2024-05-01 NOTE — PHYSICAL THERAPY NOTE
Physical Therapy Progress Note     05/01/24 0915   PT Last Visit   PT Visit Date 05/01/24   Note Type   Note Type Treatment   Pain Assessment   Pain Assessment Tool 0-10   Pain Score No Pain   Restrictions/Precautions   Other Precautions Cognitive;Chair Alarm;Bed Alarm;Fall Risk  (Alarm active post session.)   Subjective   Subjective Nursing requesting assistance to mobilize the patient to the commode. He notes that he is eager to go to rehab today.   Transfers   Sit to Stand 3  Moderate assistance   Additional items Assist x 1;Verbal cues;Increased time required   Stand to Sit 3  Moderate assistance   Additional items Assist x 1;Increased time required;Verbal cues   Stand pivot 3  Moderate assistance   Additional items Assist x 1;Increased time required;Verbal cues   Ambulation/Elevation   Gait pattern Excessively slow;Step to;Short stride;Inconsistent andrae;Decreased foot clearance;L Foot drag;R Foot drag   Gait Assistance 3  Moderate assist   Additional items Assist x 1;Verbal cues;Tactile cues   Assistive Device Other (Comment)  (Hand-hold assistance and facilitation of his LLE.)   Distance 4 feet, 3 feet, 5 feet.   Balance   Static Sitting Fair   Dynamic Sitting Fair -   Static Standing Poor +   Dynamic Standing Poor   Ambulatory Poor   Activity Tolerance   Activity Tolerance Patient tolerated treatment well   Nurse Made Aware Yes.   Assessment   Prognosis Good   Problem List Decreased strength;Decreased mobility;Impaired balance;Decreased endurance;Decreased cognition;Impaired judgement;Decreased safety awareness   Assessment Assisted the patient to and from the commode twice. He has improving balance and strength today. Also of note is his ability to weight-shift to clear his LLE is improved as well. He was able to take a few steps over to the chair, then to the commode, then back to the chair. Static stance was also performed for three minutes while being cleaned. Pt. had several questions about rehab and  his rehab plan which were addressed to his satisfaction. Alarm active post session with all needs within reach.   Barriers to Discharge Inaccessible home environment;Decreased caregiver support   Goals   Patient Goals To go to rehab.   STG Expiration Date 05/07/24   PT Treatment Day 4   Plan   Treatment/Interventions Functional transfer training;LE strengthening/ROM;Therapeutic exercise;Endurance training;Patient/family training;Bed mobility;Gait training;Elevations   Progress Progressing toward goals   PT Frequency 3-5x/wk   Discharge Recommendation   Rehab Resource Intensity Level, PT I (Maximum Resource Intensity)   AM-PAC Basic Mobility Inpatient   Turning in Flat Bed Without Bedrails 2   Lying on Back to Sitting on Edge of Flat Bed Without Bedrails 2   Moving Bed to Chair 2   Standing Up From Chair Using Arms 2   Walk in Room 2   Climb 3-5 Stairs With Railing 1   Basic Mobility Inpatient Raw Score 11   Basic Mobility Standardized Score 30.25   [facility] Highest Level Of Mobility   -HLM Goal 4: Move to chair/commode   -HLM Achieved 6: Walk 10 steps or more         An AM-PAC Basic Mobility raw score less than 16 suggests the patient may benefit from discharge to post-acute rehab services.    Victor Hugo Lew, PTA

## 2024-05-01 NOTE — NURSING NOTE
Removed Clobazapam 10 mg from [facility] on BHU for patient. Did not ask for a waste of the med on that unit since override. Med given to RN Annika to administer and instructed to waste 5 mg as order is 5 mg. This nurse was a witness to FAYE Roblero giving the required dose and wasting the other half.

## 2024-05-01 NOTE — ASSESSMENT & PLAN NOTE
Patient presenting with generalized weakness in addition to his left-sided tremors. LUE paralysis at b/l LLE near paralysis.    Assessment: weakness likely multifactorial in the setting of new onset seizures (postictal?) and metastatic lung cancer receiving chemotherapy and radiation    Plan:  - PT / OT consult  - Case management consult        - Approved to [facility], transporting this afternoon

## 2024-05-01 NOTE — ASSESSMENT & PLAN NOTE
Patient was diagnosed with adenocarcinoma of the right lung in 2023; stage Stage JAY (cT3, cN1, cM1b)   He is s/p robotic converted to right Thoracotomy and right upper lobectomy on 9/1/2023.  He has also undergone bronchoscopy with EBUS at [facility] on 4/16/2024.  Initial pathology showing metastatic lung cell carcinoma in the most recent biopsies.  He follows with [facility] oncology for ongoing cancer management and chemotherapy.    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

## 2024-05-01 NOTE — ASSESSMENT & PLAN NOTE
History of metastatic lung adenocarcinoma w/ mets to brain. Most recent staging: Stage JAY (cT3, cN1, cM1b)  He has undergone multiple rounds of chemotherapy and radiation and has undergone surgical resection for the metastatic brain lesion.  A PET scan on 3/25/2024 showed uptake in mediastinal lymph nodes compatible with metastasis.  Repeat MRI brain during current hospitalization showed stable postsurgical changes and lack of new lesions.  He has been taking Decadron at home for vasogenic edema of the brain.    Patient now presenting with new seizure activity.  He has been evaluated at the [facility] by our neurology colleagues.  He has been started on antiepileptic drugs but has continued to have breakthrough seizures.  He is being transferred to [facility] for video EEG monitoring and additional AED titration.    Assessment: Metastatic adenocarcinoma of the lung with metastasis to the brain s/p surgical resection and SRS on daily Decadron    Plan:  - Continue Decadron at 4 mg/day  -Continue outpatient follow-up with radiation oncology

## 2024-05-01 NOTE — DISCHARGE INSTR - AVS FIRST PAGE
Future Appointments   Date Time Provider Department Center   5/2/2024 11:20 AM Collin Langford MD Hem Onc [facility]   10/25/2024  2:00 PM DO EMILEE West [facility]

## 2024-05-01 NOTE — CASE MANAGEMENT
Case Management Discharge Planning Note    Patient name Paras Pitts  Location [facility] 732/[facility] 732-01 MRN [id number]  : 1955 Date 2024       Current Admission Date: 2024  Current Admission Diagnosis:Tremors of nervous system   Patient Active Problem List    Diagnosis Date Noted    Constipation by delayed colonic transit 2024    Seizure-like activity (HCC) 2024    Generalized weakness 2024    Tremors of nervous system 2024    History of CVA (cerebrovascular accident) 2023    Chemotherapy-induced neutropenia (HCC) 2023    Encounter for central line care 2023    Carcinoma of right lung (HCC) 2023    Lung nodule 2023    Metastatic adenocarcinoma (HCC)     Hypercholesterolemia 2021    Essential hypertension 2020    Annual physical exam 2020    Negative depression screening 2020    Overweight (BMI 25.0-29.9) 2020      LOS (days): 9  Geometric Mean LOS (GMLOS) (days):   Days to GMLOS:     OBJECTIVE:  Risk of Unplanned Readmission Score: 16.52         Current admission status: Inpatient   Preferred Pharmacy:   [facility] - [facility]  [place] [address]  Phone: [phone] Fax: [phone]    Primary Care Provider: Maikel Brower DO    Primary Insurance: BLUE CROSS  Secondary Insurance: MEDICARE    DISCHARGE DETAILS:                                                                                                               Facility Insurance Auth Number: [id number]

## 2024-05-01 NOTE — PROGRESS NOTES
PHYSICAL MEDICINE AND REHABILITATION   PREADMISSION ASSESSMENT     Projected IGC and Rehabilitation Diagnoses:  Impairment of mobility, safety, Activities of Daily Living (ADLs), and cognitive/communication skills due to Brain Dysfunction:  02.1  Non-Traumatic  Etiologic Dx: Right Frontal Brain Mass  Date of Onset: 3/23/2023   Date of surgery: 3/23/2023 Right Frontal Craniotomy for Tumor Resection    PATIENT INFORMATION  Name: Paras Pitts Phone #: [phone] (home)   Address: [address]  [place] [address]  YOB: 1955 Age: 68 y.o. #   Marital Status: /Civil Union  Ethnicity:   Employment Status: currently employed - owns [employer]  Extended Emergency Contact Information  Primary Emergency Contact: REKHASARAI BARBERCORTEZ  Address: [address] [place] [address] [place] [address] [place]  Mobile Phone: [phone]  Relation: Spouse  Secondary Emergency Contact: Patricia Manuel  Mobile Phone: [phone]  Relation: Daughter  Advance Directive: Level 1 Full Code - advanced directive as filed    INSURANCE/COVERAGE:     Primary Payor: BLUE CROSS / Plan: MISC BLUE CROSS / Product Type: Blue Fee for Service / [employer] BCBS Secondary Payer: Medicare Part A Only   Payer Contact: None Provided Payer Contact:   Contact Phone: [phone] Contact Phone:     Authorization #: [id number]   Coverage Dates: 5/1 - 5/7  LCD: 5/7 with review  **Spoke with LIAN Hart nurse reviewer, requesting start of care date be updated to 5/1/2024. Next review date updated to 5/7/2024.    Medical Record #: [id number]    **Confirmed patient's benefits for inpatient acute rehab as follows: $16,000 OOP ($6590 remains), $500 copay per admission, $0 deductible, 0% coinsurance.    REFERRAL SOURCE:   Referring provider: Maida Chan MD  Referring facility: [facility]  Room: [place] [address]/[place] [address]  PCP: Maikel Brower DO PCP phone number:  [phone]    MEDICAL INFORMATION  HPI: Patient is a 68 year old male that presented to [facility] on 4/20/2024 with c/o uncontrollable twitching left arm and leg. Patient with history of stage 4 lung cancer with brain mets s/p right frontal craniotomy 3/23/23. He has been managed on chronic steroids since, s/p immunotherapy. CTH showed right frontal lobe vasogenic edema again identified, known lesion better seen on recent MRI, no significant mass effect. Patient was loaded with Keppra, with plan for repeat MRI brain. 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 MRI brain showed right superior frontal mass resection and chemoradiation with unchanged linear enhancement along surgical cavity compared to 3/26/24 favoring radiation necrosis; stable surrounding hyperintense T2/FLAIR signal likely r/t combination of post-treatment changes and resolving vasogenic edema; no new suspicious intra-axial lesion. On 4/22, patient was loaded with Vimpat and transferred to [facility] for vEEG monitoring. Patient with noted increased lethargy, witnessed rhythmic LLE jerking movement occurring every 2-5 seconds. Radiation Oncology was consulted, with patient s/p 5 fractions XRT as of 4/28/23 and chemoimmunotherapy 5/18/23 - 7/20/23 d/t stage 4 lung adenocarcinoma with brain mets, s/p right thoracotomy with RUL lobectomy and extensive mediastinal lymph node dissection. He has now developed mediastinal recurrence, planned for chemoRT. Decadron dosing was increased and continued with AEDs. He will need to start Avastin and XRT outpatient in approximately 1-2 weeks. Per Neurology, patient was initiated on vEEG, with presentation consistent with EPC in setting of known supratentorial mass lesion; EPC semiology reportedly entire LLE rhythmic moderate-large amplitude movements. Patient was initiated on Benzo at HS, with scheduled Ativan. As of 4/23, he had reduced amplitude and  frequency on EEG with Keppra and Vimpat. Vimpat dose was increased, and started on Ativan taper on 4/24. Later in day, patient with x2 right frontocentral seizures on EEG. Per Neurology, vEEG was discontinued on 4/24. Ativan was also discontinued s/p initiation of Onfi BID. Patient is to continue with higher dose Decadron d/t vasogenic edema (recently decreased outpatient). He underwent [facility]SIM at [facility] on 4/29. Patient is overall hemodynamically stable and medically cleared for discharge to [facility]. PT/OT therapies were consulted, as well as patient's case reviewed with [facility] physician, and they are recommending patient for inpatient Acute Rehab. He has demonstrated that he can tolerate and participate in 3 hours of therapy per day.    Received both Pfizer vaccinations.     Past Medical History:   Past Surgical History:   Allergies:     Past Medical History:   Diagnosis Date    Brain compression (HCC) 03/20/2023    Brain mass 03/20/2023    Cancer (HCC) 2001    Receint lung and brain lesions and prostate cancer in 2001    Cerebral edema (HCC) 03/20/2023    Hypertension     Prostate cancer (HCC) 2001    Rectal bleeding     Stroke (HCC)     2011      Past Surgical History:   Procedure Laterality Date    COLONOSCOPY      CRANIOTOMY Right 3/23/2023    Procedure: Right frontal CRANIOTOMY IMAGE-GUIDED FOR TUMOR;  Surgeon: Clark Carrillo MD;  [facility];  Service: Neurosurgery    ENDOBRONCHIAL ULTRASOUND (EBUS) N/A 4/16/2024    Procedure: ENDOBRONCHIAL ULTRASOUND (EBUS);  Surgeon: Kalia Sparrow MD;  [facility];  Service: Thoracic    IR PORT PLACEMENT  4/27/2023    [place] [facility] INCL FLUOR GDNCE DX W/CELL WASHG SPX N/A 9/1/2023    Procedure: BRONCHOSCOPY FLEXIBLE;  Surgeon: Kalia Sparrow MD;  [facility]: [facility];  Service: Thoracic    [place] [facility] GDN[facility] DX W/CELL WASHG SPX N/A 4/16/2024    Procedure: BRONCHOSCOPY FLEXIBLE;  Surgeon: Kalia Sparrow MD;  [facility];  Service: Thoracic    [place] CYSTOURETHROSCOPY N/A 3/23/2023    Procedure: EUA, DEL CASTILLO INSERTION;  Surgeon: Ajay Piedra MD;  [facility]: [facility];  Service: Urology    [place] THORACOSCOPY W/LOBECTOMY SINGLE LOBE Right 9/1/2023    Procedure: robotic assisted converted to open right upper lobectomy, lymph node dissection;  Surgeon: Kalia Sparrow MD;  [facility];  Service: Thoracic    PROSTATECTOMY  2001    THORACOTOMY Right 9/1/2023    Procedure: right thoracotomy with Cryo-Ablation;  Surgeon: Kalia Sparrow MD;  [facility];  Service: Thoracic    TONSILLECTOMY       No Known Allergies      Medical/functional conditions requiring inpatient rehabilitation: right frontal brain mass with new onset seizures, generalized weakness, LUE/LLE tremors, acute on chronic left sided weakness, diplopia, vasogenic edema, impaired mobility and self care, impaired cognition, impaired balance, decreased strength/endurance    Risk for medical/clinical complications: risk for falls, risk for uncontrolled pain, risk for skin breakdown, risk for infection, risk for aspiration, risk for DVT/PE, risk for seizures, risk for hypo/hypertensive episodes, risk for respiratory distress    Comorbidities/Surgeries in the last 100 days:  stage 4 lung cancer with brain metastasis s/p right frontal craniotomy 3/23/23, right thoracotomy with RUL lobectomy, HTN, prostate cancer, CVA with residual left sided weakness, HLD    CURRENT VITAL SIGNS:   Temp:  [97.8 °F (36.6 °C)-98.3 °F (36.8 °C)] 98.3 °F (36.8 °C)  HR:  [75-79] 75  Resp:  [16-17] 16  BP: (125-133)/(85-86) 133/86   Intake/Output Summary (Last 24 hours) at 5/1/2024 1227  Last data filed at 5/1/2024 1031  Gross per 24 hour   Intake 360 ml   Output 1250 ml   Net -890 ml        LABORATORY RESULTS:      Lab Results   Component Value Date    HGB 13.6 04/23/2024    HGB 14.6 05/10/2018    HCT 41.2 04/23/2024    HCT 43.6 05/10/2018    WBC 7.71 04/23/2024    WBC 7.1 05/10/2018      Lab Results   Component Value Date    BUN 17 2024    BUN 9 2022     05/10/2018    K 3.7 2024    K 3.8 2022     2024     2022    GLUCOSE 190 (H) 2023    CREATININE 0.80 2024    CREATININE 0.80 2022     Lab Results   Component Value Date    PROTIME 12.6 2024    INR 0.92 2024        DIAGNOSTIC STUDIES:  No results found.    PRECAUTIONS/SPECIAL NEEDS:  Tobacco:   Social History     Tobacco Use   Smoking Status Former    Current packs/day: 0.00    Average packs/day: 1 pack/day for 15.0 years (15.0 ttl pk-yrs)    Types: Cigarettes    Start date:     Quit date:     Years since quittin.3    Passive exposure: Never   Smokeless Tobacco Never   Tobacco Comments    i quit when i was 28. not sure of exact dates   , Alcohol:    Social History     Substance and Sexual Activity   Alcohol Use Not Currently    Alcohol/week: 1.0 standard drink of alcohol    Types: 1 Shots of liquor per week    Comment: socially   , Anticoagulation:   Lovenox SQ , Edema Management, Safety Concerns, Pain Management, Aspiration Risk/Precautions, Dietary Restrictions: Cardiac diet, Visually Impaired, Language Preference: English, Seizure Precautions and Fall Precautions.    MEDICATIONS:     Current Facility-Administered Medications:     acetaminophen (TYLENOL) tablet 975 mg, 975 mg, Oral, Q8H PRN, George Burnett MD, 975 mg at 24 0336    aluminum-magnesium hydroxide-simethicone (MAALOX) oral suspension 30 mL, 30 mL, Oral, Q6H PRN, George Burnett MD    amLODIPine (NORVASC) tablet 10 mg, 10 mg, Oral, Daily, George Burnett MD, 10 mg at 24 0945    atorvastatin (LIPITOR) tablet 40 mg, 40 mg, Oral, After Dinner, George Burnett MD, 40 mg at 24 1742    bisacodyl (DULCOLAX) rectal suppository 10 mg, 10 mg, Rectal, Daily PRN, George Burnett MD, 10 mg at 24 0900    cloBAZam (ONFI) tablet 5 mg, 5 mg,  Oral, BID, Syed Rubio DO, 5 mg at 05/01/24 0945    dexamethasone (DECADRON) tablet 4 mg, 4 mg, Oral, Daily, Abhijeet Babcock MD, 4 mg at 05/01/24 0945    enoxaparin (LOVENOX) subcutaneous injection 40 mg, 40 mg, Subcutaneous, Daily, George Burnett MD, 40 mg at 05/01/24 0944    lacosamide (VIMPAT) tablet 200 mg, 200 mg, Oral, Q12H LEN, Syed Rubio DO, 200 mg at 05/01/24 0945    levETIRAcetam (KEPPRA) tablet 2,000 mg, 2,000 mg, Oral, Q12H LEN, Syed Rubio DO, 2,000 mg at 05/01/24 0945    losartan (COZAAR) tablet 50 mg, 50 mg, Oral, Daily, George Burnett MD, 50 mg at 05/01/24 0945    ondansetron (ZOFRAN-ODT) dispersible tablet 4 mg, 4 mg, Oral, Q6H PRN, George Burnett MD    pantoprazole (PROTONIX) EC tablet 40 mg, 40 mg, Oral, Early Morning, George Burnett MD, 40 mg at 05/01/24 0548    polyethylene glycol (MIRALAX) packet 17 g, 17 g, Oral, Daily, George Burnett MD, 17 g at 04/30/24 0922    pramipexole (MIRAPEX) tablet 0.25 mg, 0.25 mg, Oral, TID, George Burnett MD, 0.25 mg at 05/01/24 0945    senna (SENOKOT) tablet 8.6 mg, 1 tablet, Oral, HS, George Burnett MD, 8.6 mg at 04/30/24 2131    SKIN INTEGRITY:   no rashes, no erythema, no peripheral edema    PRIOR LEVEL OF FUNCTION:  He lives in a(n) single family home  Paras Pitts is  and lives with their spouse.  Self Care: Independent, Indoor Mobility: Modified Independent, Stairs (in/outdoor): Modified Independent, and Cognition: Independent  Prior to patient's admission, patient was fully Independent with ADLs and IADLs, including driving. He was Modified Independent with occasional use of SPC for mobility.     FALLS IN THE LAST 6 MONTHS: one    HOME ENVIRONMENT:  The living area: can live on one level  There are 4-5 steps to enter the home, with full flight of stairs inside the home. Patient can D/C home at WC level if needed.  The patient will have 24 hour supervision/physical assistance  available upon discharge.  Patient's spouse works during the day in [place] every other week, however is able to work from home if needed. Spouse is also able to take time off from work if needed. Additionally, they have very supportive friends/neighbors that can provide assistance, including physical assist.     PREVIOUS DME:  Equipment in home (previous DME): Shower Chair, Grab Bars, and Single Point Cane    FUNCTIONAL STATUS:  Physical Therapy Occupational Therapy Speech Therapy   4/30/2024, per PTA    Subjective   Subjective The patient denies any pain, and he is eager to get moving.   Bed Mobility   Rolling R 3  Moderate assistance   Additional items Assist x 1;Increased time required;LE management   Supine to Sit 4  Minimal assistance   Additional items Assist x 1;Increased time required;HOB elevated;LE management   Transfers   Sit to Stand 4  Minimal assistance   Additional items Assist x 2;Increased time required;Verbal cues   Stand to Sit 4  Minimal assistance   Additional items Assist x 2;Increased time required;Verbal cues   Stand pivot 3  Moderate assistance   Additional items Assist x 1;Increased time required;Verbal cues   Ambulation/Elevation   Gait pattern Excessively slow;Step to;Short stride;Inconsistent andrae;Decreased foot clearance;L Foot drag;R Foot drag   Gait Assistance 3  Moderate assist   Additional items Assist x 2;Verbal cues;Tactile cues   Assistive Device Other (Comment)  (Right rail and left support at hip and knee.)   Distance 3 feet, 10 feet x 2.   Balance   Static Sitting Fair   Dynamic Sitting Fair -   Static Standing Poor +   Dynamic Standing Poor   Ambulatory Poor   Activity Tolerance   Activity Tolerance Patient tolerated treatment well   Nurse Made Aware FAYE Barajas.   Exercises   Knee AROM Long Arc Quad Sitting;10 reps;Left;AROM   Marching Sitting;Left;AAROM;20 reps   Assessment   Prognosis Good   Problem List Decreased strength;Decreased mobility;Impaired balance;Decreased  endurance;Decreased cognition;Impaired judgement;Decreased safety awareness   Assessment The patient is demonstrating progress with improving balance, strength, and ambulatory distance today. The patient was initially trialed with bilateral hand-hold assistance for the short transfer over to the chair. He had no overt buckling, and he tolerated stance well, and transitioned to utilize the hand-rail in the hallway for gait. With this the patient was able to maintain stance with support of his left peter-paretic side. He was able to maintain hip extension as well as knee extension with his leg slightly forward to facilitate this. Assistance was necessary in order to advance his LLE, but his weight-shifting improved during the session to more easily perform this manuever. His stride length progressed with each trial as well. While the patient did become fatigued, he responded very well to the treatment. He will certainly benefit from continued aggressive therapy programs to maximize his functional mobility. With discussion with the patient he is eager to progress to an ambulatory state. Pt. may benefit from a trial of a hemiwalker in the next few weeks as his overall strength, balance, and competence with upright mobility improves.    4/29/2024, per OT    ADL   Where Assessed Chair   Eating Assistance 5  Supervision/Setup   Eating Deficit Setup   Grooming Assistance 3  Moderate Assistance   Grooming Deficit Wash/dry hands;Wash/dry face  (S to wash face, mod a to wash right hand)   UB Bathing Assistance 2  Maximal Assistance   UB Bathing Deficit Right arm;Left arm;Buttocks   LB Bathing Assistance 3  Moderate Assistance   LB Bathing Deficit Right lower leg including foot;Left lower leg including foot;Buttocks   UB Dressing Assistance 3  Moderate Assistance   UB Dressing Deficit Thread LUE;Thread RUE   LB Dressing Assistance 2  Maximal Assistance   LB Dressing Deficit Don/doff R sock;Don/doff L sock   Toileting Assistance   2  Maximal Assistance   Bed Mobility   Supine to Sit 3 moderate assistance with increased time, HOB elevated and bed rail   Additional items Assist x 1   Sit to Supine 3  Moderate assistance   Additional items Assist x 2   Transfers   Sit to Stand 3  Moderate assistance   Additional items Assist x 2   Stand to Sit 3  Moderate assistance   Additional items Assist x 2   Stand pivot 3  Moderate assistance   Additional items Assist x 2  (bed<>chair with B/LUE HHA with left UE supported for protection stratgey 2* to hemiplegia, left knee blocked)   Therapeutic Exercise - ROM   UE-ROM Yes   ROM - Left Upper Extremities    L Shoulder AAROM;Flexion;Horizontal ABduction   L Elbow Elbow extension;Elbow flexion;AROM   L Wrist Wrist extension;Wrist flexion;AROM   L Hand Thumb;Index finger;Long finger;Ring finger;Little finger  (with issued soft tan theraputty with various grasp/pinch patterns provided visual handout for pt reference)   L Position Seated   L Weight/Reps/Sets 5x   LUE ROM Comment left UE hemiparesis -shoulder 2/5, elbow 2+/5,  3+/5   Cognition   Overall Cognitive Status Impaired   Arousal/Participation Alert;Responsive;Cooperative   Attention Attends with cues to redirect   Orientation Level Oriented X4   Memory Decreased recall of precautions;Decreased recall of recent events;Decreased short term memory   Following Commands Follows one step commands with increased time or repetition   Comments pt motivated for therapy, impaired sustained attention, distractible with room environement, verbal cues for task initation/completition, working memory with self care steps.   Perception   Perception Yes   Left Attention left UE neglect -verbal cues throughout session to incorporate with functional tasks   Perception Comments hand over hand assistance to wqash right UE   Activity Tolerance   Activity Tolerance Patient tolerated treatment well   Assessment   Assessment Patient participated in Skilled OT session this  date with interventions consisting of self care tasks, functional transfers, left UE therapeutic exercises, HEP . Patient agreeable to OT treatment session, upon arrival patient was found supine in bed . Patient requiring verbal cues for safety and verbal cues for correct technique. Patient continues to be functioning below baseline level, occupational performance remains limited secondary to factors listed above and increased risk for falls and injury.   From OT standpoint, recommendation at time of d/c would be max level I.  The patient's raw score on the -PAC Daily Activity Inpatient Short Form is 14. A raw score of less than 19 suggests the patient may benefit from discharge to post-acute rehabilitation services. Please refer to the recommendation of the Occupational Therapist for safe discharge planning.  Patient to benefit from continued Occupational Therapy treatment while in the hospital to address deficits as defined above and maximize level of functional independence with ADLs and functional mobility.    Patient was not evaluated by SLP in acute hospital setting. However, may benefit from SLP evaluation while on ARC.     CARE SCORES:  Self Care:  Eatin: Setup or clean-up assistance  Oral hygiene: 03: Partial/moderate assistance  Toilet hygiene: 02: Substantial/maximal assistance  Shower/bathing self: 02: Substantial/maximal assistance  Upper body dressin: Partial/moderate assistance  Lower body dressin: Substantial/maximal assistance  Putting on/taking off footwear: 02: Substantial/maximal assistance  Transfers:  Roll left and right: 03: Partial/moderate assistance  Sit to lyin: Not applicable  Lying to sitting on side of bed: 04: Supervision or touching  assistance  Sit to stand: 03: Partial/moderate assistance  Chair/bed to chair transfer: 03: Partial/moderate assistance  Toilet transfer: 09: Not applicable  Mobility:  Walk 10 ft: 02: Substantial/maximal assistance  Walk 50 ft with  two turns: 10: Not attempted due to environmental limitations  Walk 150ft: 88: Not attempted due to medical conditions or safety concerns    CURRENT GAP IN FUNCTION  Prior to Admission: Functional Status: Patient was independent with mobility/ambulation, transfers, ADL's, IADL's.    Expected functional outcomes: It is expected that with skilled acute rehabilitation services the patient will progress to Minimal Assistance for self care and Minimal Assistance for mobility     Estimated length of stay: 10 to 14 days    Anticipated Post-Discharge Disposition/Treatment  Disposition: Return to previous home/apartment.  Outpatient Services: Physical Therapy (PT) and Occupational Therapy (OT)    BARRIERS TO DISCHARGE  Lovenox, Weakness, Pain, Diminished cognition/Mentation change, Balance Difficulty, Fatigue, Home Accessibility, Caregiver Accessibility, Financial Resources, Equipment Needs, and Resource Availability    INTERVENTIONS FOR DISCHARGE  Adaptive equipment, Patient/Family/Caregiver Education, Community Resources, Financial Assistance, Arrange DME needs, Medication Changes as per MD recommendations, Therapy exercises, Center of balance support , and Energy conservation education     REQUIRED THERAPY:  Patient will require PT and OT 90 minutes each per day, five days per week to achieve rehab goals.     REQUIRED FUNCTIONAL AND MEDICAL MANAGEMENT FOR INPATIENT REHABILITATION:  Skin:  There are no pressure sores currently, Pain Management: Overall pain is well controlled, Deep Vein Thrombosis (DVT) Prophylaxis:  Lovenox SQ, further IM management of additional medical conditions while on ARC, he needs PT/OT intervention, patient/family education and training, possible Neuropsych and Neurosurgery consults with any other needed consults prn, nursing medication review and management of bowel/bladder function.    RECOMMENDED LEVEL OF CARE:   Patient is a 68 year old male that presented to [facility] on  4/20/2024 with c/o uncontrollable twitching left arm and leg. Patient with history of stage 4 lung cancer with brain mets s/p right frontal craniotomy 3/23/23. He has been managed on chronic steroids since, s/p immunotherapy. CTH showed right frontal lobe vasogenic edema again identified, known lesion better seen on recent MRI, no significant mass effect. Patient was loaded with Keppra, with plan for repeat MRI brain. 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 MRI brain showed right superior frontal mass resection and chemoradiation with unchanged linear enhancement along surgical cavity compared to 3/26/24 favoring radiation necrosis; stable surrounding hyperintense T2/FLAIR signal likely r/t combination of post-treatment changes and resolving vasogenic edema; no new suspicious intra-axial lesion. On 4/22, patient was loaded with Vimpat and transferred to [facility] for vEEG monitoring. Patient with noted increased lethargy, witnessed rhythmic LLE jerking movement occurring every 2-5 seconds. Radiation Oncology was consulted, with patient s/p 5 fractions XRT as of 4/28/23 and chemoimmunotherapy 5/18/23 - 7/20/23 d/t stage 4 lung adenocarcinoma with brain mets, s/p right thoracotomy with RUL lobectomy and extensive mediastinal lymph node dissection. He has now developed mediastinal recurrence, planned for chemoRT. Decadron dosing was increased and continued with AEDs. He will need to start Avastin and XRT outpatient in approximately 1-2 weeks. Per Neurology, patient was initiated on vEEG, with presentation consistent with EPC in setting of known supratentorial mass lesion; EPC semiology reportedly entire LLE rhythmic moderate-large amplitude movements. Patient was initiated on Benzo at HS, with scheduled Ativan. As of 4/23, he had reduced amplitude and frequency on EEG with Keppra and Vimpat. Vimpat dose was increased, and started on Ativan taper on 4/24. Later in day,  patient with x2 right frontocentral seizures on EEG. Per Neurology, vEEG was discontinued on 4/24. Ativan was also discontinued s/p initiation of Onfi BID. Patient is to continue with higher dose Decadron d/t vasogenic edema (recently decreased outpatient). He underwent CT[facility]SIM at [facility] on 4/29. Prior to patient's admission, patient was fully Independent with ADLs and IADLs, including driving. He was Modified Independent with occasional use of SPC for mobility. Currently, patient is Min-Mod assist of 1-2 with right rail for gait and transfers, and Mod/Max assist for both UB and LB ADLs. Close medical management and PM&R management is recommended at this time while patient is on the ARC. Inpatient acute rehab is recommended for patient to maximize overall strength and mobility, along with intensive family training, upon discharge to home with support of family/friends, possibly at  level.

## 2024-05-01 NOTE — ASSESSMENT & PLAN NOTE
Patient was originally admitted to the [facility] on 4/20/2024 after acute onset tremors of the right upper and lower extremity.  He does have a history of stroke with left-sided weakness and his new tremors were thought to be related to his previous neurologic injury.  Initial stroke workup was negative and neurology was consulted.  Imaging was obtained including MRI that showed no additional evidence of stroke/TIA. Neurology then began an evaluation for seizure like activity in this patient because of his history of brain metastasis and surgical resection of brain mets.  He was loaded with Keppra and was admitted for close monitoring.  Despite his initial AED dosing the patient's tremors continued.  At this point neurology increased his AED medication regimen with the addition of Vimpat to his twice daily Keppra. He was loaded with 200 mg Vimpat and will continue 100 mg twice daily. Neurology also recommended transfer to [facility] for video EEG monitoring and further titration of antiepileptic drugs.    Assessment: New onset partial seizures, concern for status     Plan:  - Neurology consulted, AEDs optimized at this point  - Patient will require 4-week follow-up with epilepsy attending

## 2024-05-01 NOTE — PLAN OF CARE
Problem: OCCUPATIONAL THERAPY ADULT  Goal: Performs self-care activities at highest level of function for planned discharge setting.  See evaluation for individualized goals.  Description: Treatment Interventions: ADL retraining, Functional transfer training, UE strengthening/ROM, Endurance training, Cognitive reorientation, Patient/family training, Equipment evaluation/education, Compensatory technique education, Energy conservation, Activityengagement, Continued evaluation          See flowsheet documentation for full assessment, interventions and recommendations.   Note: Limitation: Decreased ADL status, Decreased endurance, Decreased self-care trans, Decreased high-level ADLs, Decreased cognition, Decreased Safe judgement during ADL, Decreased UE strength, Decreased UE ROM, Visual deficit, Decreased fine motor control  Prognosis: Good  Assessment: Patient participated in Skilled OT session this date with interventions consisting of self care tasks, left UE therapeutic exercises with connect four game, pvc pipe application, C/C engagement with functional tasks. . Patient agreeable to OT treatment session, upon arrival patient was found seated OOB to Chair.  In comparison to previous session, patient with improvements in left UE strength . Patient requiring verbal cues for safety, verbal cues for correct technique, verbal cues for pacing thru activity steps, and cognitive assistance to anticipate next step. Patient continues to be functioning below baseline level, occupational performance remains limited secondary to factors listed above and increased risk for falls and injury.   From OT standpoint, recommendation at time of d/c would be max level I.  The patient's raw score on the AM-PAC Daily Activity Inpatient Short Form is 14. A raw score of less than 19 suggests the patient may benefit from discharge to post-acute rehabilitation services. Please refer to the recommendation of the Occupational Therapist  for safe discharge planning.   Patient to benefit from continued Occupational Therapy treatment while in the hospital to address deficits as defined above and maximize level of functional independence with ADLs and functional mobility.     Rehab Resource Intensity Level, OT: I (Maximum Resource Intensity)

## 2024-05-01 NOTE — NURSING NOTE
Transport took pt w/ out me in the room. Pt removed his iv and it was not left out for me to visually inspect. Pedro Shefali mentioned she placed in sharps container.   I explained to the 3 transport workers that I would be with them as soon as I finished d/c another pt. They did not wait and left w/out me being present.

## 2024-05-01 NOTE — PLAN OF CARE
Problem: PHYSICAL THERAPY ADULT  Goal: Performs mobility at highest level of function for planned discharge setting.  See evaluation for individualized goals.  Description: Treatment/Interventions: Functional transfer training, LE strengthening/ROM, Therapeutic exercise, Endurance training, Cognitive reorientation, Patient/family training, Equipment eval/education, Bed mobility, Gait training, Spoke to nursing, Spoke to case management, OT          See flowsheet documentation for full assessment, interventions and recommendations.  5/1/2024 1741 by Victor Hugo Lew PTA  Outcome: Progressing  Note: Prognosis: Good  Problem List: Decreased strength, Decreased endurance, Impaired balance, Decreased mobility, Decreased cognition, Impaired judgement, Decreased safety awareness, Impaired tone  Assessment: Pt agreeable to participate in PT session. Pt performed functional mobility as outlined above. Continues to demonstrate L sided weakness with decreased ability to take lateral steps to commode. Pt left seated in chair with chair alarm, call bell, phone, and all personal needs within reach. Pt will continue to benefit from skilled acute care PT to further address their functional mobility limitations.  Barriers to Discharge: Decreased caregiver support, Inaccessible home environment     Rehab Resource Intensity Level, PT: I (Maximum Resource Intensity)    See flowsheet documentation for full assessment.     5/1/2024 1741 by Victor Hugo Lew PTA  Reactivated

## 2024-05-01 NOTE — DISCHARGE SUMMARY
[facility]  Discharge- Paras Pitts 1955, 68 y.o. male MRN: [id number]  Unit/Bed#: PPHP 732-01 Encounter: [id number]  Primary Care Provider: Maikel Brower DO   Date and time admitted to hospital: 4/22/2024  6:46 PM    * Tremors of nervous system  Assessment & Plan  Patient was originally admitted to the [facility] on 4/20/2024 after acute onset tremors of the right upper and lower extremity.  He does have a history of stroke with left-sided weakness and his new tremors were thought to be related to his previous neurologic injury.  Initial stroke workup was negative and neurology was consulted.  Imaging was obtained including MRI that showed no additional evidence of stroke/TIA. Neurology then began an evaluation for seizure like activity in this patient because of his history of brain metastasis and surgical resection of brain mets.  He was loaded with Keppra and was admitted for close monitoring.  Despite his initial AED dosing the patient's tremors continued.  At this point neurology increased his AED medication regimen with the addition of Vimpat to his twice daily Keppra. He was loaded with 200 mg Vimpat and will continue 100 mg twice daily. Neurology also recommended transfer to [facility] for video EEG monitoring and further titration of antiepileptic drugs.    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 Most recent staging: Stage JAY (cT3, cN1, cM1b)  He has undergone multiple rounds of chemotherapy and radiation and has undergone surgical resection for the metastatic brain lesion.  A PET scan on 3/25/2024 showed uptake in mediastinal lymph nodes compatible with metastasis.  Repeat MRI brain  during current hospitalization showed stable postsurgical changes and lack of new lesions.  He has been taking Decadron at home for vasogenic edema of the brain.    Patient now presenting with new seizure activity.  He has been evaluated at the [facility] by our neurology colleagues.  He has been started on antiepileptic drugs but has continued to have breakthrough seizures.  He is being transferred to [facility] for video EEG monitoring and additional AED titration.    Assessment: Metastatic adenocarcinoma of the lung with metastasis to the brain s/p surgical resection and SRS on daily Decadron    Plan:  - Continue Decadron at 4 mg/day  -Continue outpatient follow-up with radiation oncology    Carcinoma of right lung (HCC)  Assessment & Plan  Patient was diagnosed with adenocarcinoma of the right lung in 2023; stage Stage JAY (cT3, cN1, cM1b)   He is s/p robotic converted to right Thoracotomy and right upper lobectomy on 9/1/2023.  He has also undergone bronchoscopy with EBUS at [facility] on 4/16/2024.  Initial pathology showing metastatic lung cell carcinoma in the most recent biopsies.  He follows with [facility] hematology oncology for ongoing cancer management and chemotherapy.    Assessment: Right lung adenocarcinoma with metastasis to brain and mediastinal lymph nodes    Plan:  - Continue follow-up with radiation oncology       Constipation by delayed colonic transit  Assessment & Plan  Patient without bowel movement for several days.  This has been an ongoing problem at home as well.  Standard bowel regimen not effective while hospitalized to date.    Assessment: Constipation, likely secondary to prolonged reduced mobility and recent medication changes.  Small bowel movement this morning, will try suppository.    Improving    Plan:  - Continue MiraLAX once daily   - Continue senna  - Dulcolax suppository as needed     Generalized weakness  Assessment & Plan  Patient presenting with  "generalized weakness in addition to his left-sided tremors. LUE paralysis at b/l LLE near paralysis.    Assessment: weakness likely multifactorial in the setting of new onset seizures (postictal?) and metastatic lung cancer receiving chemotherapy and radiation    Plan:  - PT / OT consult  - Case management consult        - Approved to [facility], transporting this afternoon    History of CVA (cerebrovascular accident)  Assessment & Plan  Patient has a history of stroke and has left-sided weakness as a long-term sequelae.    Assessment: History of CVA with residual left-sided weakness    Plan:  - Continue statin           Hypercholesterolemia  Assessment & Plan  Continue home statin          Essential hypertension  Assessment & Plan  Patient has known history of hypertension, no issues with blood pressure prior to transfer.  Home regimen: Amlodipine, losartan    Assessment: Hypertension, controlled.  Slightly above goal range    Plan:  - Continue close monitoring  - Continue amlodipine and losartan at current doses           Discharge Summary - Paras Pitts 68 y.o. male MRN: [id number]    Unit/Bed#: PPHP 732-01 Encounter: [id number]    Admission Date: 4/22/2024   Discharge Date: 5/1/2024    Admitting Diagnosis:   Patient Active Problem List   Diagnosis    Negative depression screening    Overweight (BMI 25.0-29.9)    Essential hypertension    Annual physical exam    Hypercholesterolemia    Lung nodule    Metastatic adenocarcinoma (HCC)    Carcinoma of right lung (HCC)    Encounter for central line care    Chemotherapy-induced neutropenia (HCC)    History of CVA (cerebrovascular accident)    Generalized weakness    Tremors of nervous system    Seizure-like activity (HCC)    Constipation by delayed colonic transit        HPI: From H&P dated: 4/22/2024 \"Paras Pitts is a 68 y.o. male who presents with new onset left-sided tremors.  Has an extensive cancer history after an initial diagnosis of right lung adenocarcinoma in 2023.  " Since that time he has undergone multiple rounds of chemotherapy and radiation.  He follows with [facility] hematology oncology and [facility] radiation oncology. He also has a history of CVA in 2011 and has residual left-sided weakness from that neurologic injury.  His other chronic medical comorbidities including hypertension and hyperlipidemia are well-controlled at this time.     He is presenting to [facility] as a transfer from the [facility] after he began with left-sided tremors on 4/17/2024. He described it as uncontrollable twitching in his left arm and left leg.  He denied any preceding events, injuries, or traumas.  He did not present to the emergency department until approximately 3 days after the tremors began.     Initial evaluation in the [facility] emergency department was unremarkable.  His vital signs were stable, his lab work was within normal limits, and a stat CT head showed stable changes from his recent brain metastasis resection.  Because of the severity of symptoms he was admitted for further observation and monitoring.     Neurology was consulted who do not think the patient's symptoms were secondary to a stroke but more likely due to seizures.  He was loaded with Keppra and monitored closely.  Despite the antiepileptic drug dosing he continued to have severe and frequent twitching in the left arms and legs.  Neurology then escalated the Keppra dosing and added Vimpat for additional seizure control.  At this point, the working diagnosis was partial seizures and status epilepticus.     Further evaluation was felt to be beneficial for the patient and he was transferred to [facility] for video EEG monitoring.  Dr. Guillen of epileptologist approved to transfer and agreed with this recommendation.     He arrived to [facility] in stable condition. He mentioned that he felt as if he had double vision when looking at cars very far away during the ambulance ride  "over. However, this had resolved by our examination. He continued to have LLE twitching but no LUE symptoms. He continues to have no intentional L sided movement. Sensation intact b/l. Belly firm and distended but non-tender without masses. Patient states he has \"a gut\" from being on steroids and that is why his abdomen appears that way. Umbilical hernia present non-tender and reducible. Denied any pain, N/V/D, CP, SOB, fevers or chills.\"    Hospital Course:   Patient was transferred from a [facility] for video EEG and antiepileptic drug titration.  He was thought to be in partial seizure status epilepticus.    When he arrived he was very weak on the left side and had difficulty moving his left upper extremity and had worse difficulty moving his left lower extremity.  He was started on Keppra at the [facility] and Ativan for seizure suppression.  Multiple events were noted on video EEG and Vimpat was added to his AED regimen.  Further, on Fe was added after additional events were observed by our neurology team.  After 1 to 2 days on video EEG they saw suppression of seizure activity and he was switched from IV AEDs to p.o.  He had no further seizure-like activity while on these 3 medications; Keppra, Vimpat, Onfi.    At this point he was medically stable for discharge from a neurology standpoint.  As part of his outpatient radiation-oncology treatment a CT-SIM was planned for him over the course of time while he was admitted.  On Monday, 4/29/2024 he was transported to [facility] and underwent the CT-SIM procedure.  He was transported back in good condition.  Prior to him being discharged she was evaluated by our ARC team.  He was deemed to be a good candidate for ARC and was accepted pending insurance approval.  On 5/1/2024 insurance approved his arc admission and he was transported to [facility] in good condition.    All medication changes were reviewed and necessary follow-ups were " discussed prior to the patient leaving the hospital.     Exam on Day of Discharge:   Vitals:    04/29/24 2115 04/30/24 0740 04/30/24 1631 05/01/24 0828   BP: 123/76 151/88 125/85 133/86   Pulse: 77 82 79 75   Resp: 18 17 17 16   Temp: 97.8 °F (36.6 °C) 98.2 °F (36.8 °C) 97.8 °F (36.6 °C) 98.3 °F (36.8 °C)   TempSrc:       SpO2: 96% 97% 96% 96%         Physical Exam  General:  alert, appropriately interactive, no acute distress  Head:  atraumatic and normocephalic  Nose:  midline, clear, no rhinorrhea  Throat/Tongue: Tongue protrudes midline, moist mucous membranes, oropharynx clear  Neck:  Full ROM intact without pain/stiffness, no lymphadenopathy  Lungs: Non-labored breathing, good aeration, lungs clear b/l  Heart:  Regular rate and rhythm, no murmurs/rubs appreciated  Abdomen:  Abdomen soft, non-tender. BS+ x4, no masses  Neuro:  AAOx3, : 4 out of 5 strength in the left upper extremity, 2 out of 5 strength in the left lower extremity   Musculoskeletal: No pitting edema in LEs b/l, moves all extremities freely, no gross deformities noted  Skin:  intact throughout, warm, no rashes or bruising noted     Significant Findings/Test Results:  none    Procedures Performed:   none    Consultation(s) During Hospital Stay:  Neurology     Incidental Findings:   None    Discharge Medications:  Significant medication changes during admission - See after visit summary for complete discharge medication reconciliation.    Start:  1) Keppra 2 g twice daily  2) Onfi 5 mg twice daily  3) Vimpat 200 mg twice daily    Change: none    Do not restart:  Please follow-up with your PCP or original prescriber for instructions on if/when to restart any home medications that were not prescribed during your hospitalization.    Continue: All other home medications as originally prescribed    Required Outpatient Follow-up:   PCP: Maikel Brower DO. Instructed patient to schedule PCP visit within 1 week of discharge from  "hospital/facility.  Neurology: 4 weeks from 5/1/2024  Please see \"Follow-up Providers\" section of AVS for detailed instructions and other providers not listed above     Future Appointments   Date Time Provider Department Center   5/2/2024 11:20 AM Collin Langford MD Hem Onc [facility]On[facility]   10/25/2024  2:00 PM Maikel Valencia,  [facility]        Test Results Pending at Discharge:  None    Complications: None  Condition at Discharge: Good   Disposition: Short-term rehab at [facility]      On the day of discharge the patient is stable and without new concerns. He/she denies clinical deterioration of their presenting problem/chief complaint unless otherwise stated. He/she is able to tolerate an appropriate diet. He/she is able to ambulate at baseline or to the maximum level during this hospitalization. He/she is aware of the current status of their medical problems and understands the plan of treatment going forward, including any/all outpatient follow-up. The patient understands and agrees with the plan as communicated today. All pertinent lab results, imaging studies, procedures, incidental findings, and medication changes have been communicated to the patient and/or their family.   All pertinent questions have been answered to the best of my ability.     Discharge instructions/Information to patient and family:   See after visit summary for detailed information provided to patient and family    Provisions for Follow-Up Care: See after visit summary for information related to follow-up care and any pertinent home health orders.      Planned Readmission: None    Discharge Statement: I spent 30 minutes discharging the patient. This time was spent on the day of discharge. I had direct contact with the patient on the day of discharge. Additional documentation is required if more than 30 minutes were spent on discharge.     The above plan was discussed with the attending physician who saw the patient on the day " of discharge.    George Burnett MD  [facility], [facility]   5/1/2024

## 2024-05-02 ENCOUNTER — TRANSITIONAL CARE MANAGEMENT (OUTPATIENT)
Dept: FAMILY MEDICINE CLINIC | Facility: CLINIC | Age: 69
End: 2024-05-02

## 2024-05-02 ENCOUNTER — RA CDI HCC (OUTPATIENT)
Dept: OTHER | Facility: HOSPITAL | Age: 69
End: 2024-05-02

## 2024-05-02 PROBLEM — F48.2 PSEUDOBULBAR AFFECT: Status: ACTIVE | Noted: 2024-05-02

## 2024-05-02 PROBLEM — F43.20 ADJUSTMENT DISORDER: Status: ACTIVE | Noted: 2024-05-02

## 2024-05-02 PROBLEM — G40.909 SEIZURE DISORDER (HCC): Status: ACTIVE | Noted: 2024-05-02

## 2024-05-02 PROBLEM — G81.94 LEFT HEMIPARESIS (HCC): Status: ACTIVE | Noted: 2024-05-02

## 2024-05-02 PROBLEM — G93.89 FRONTAL MASS OF BRAIN: Status: ACTIVE | Noted: 2024-05-02

## 2024-05-02 LAB
PLATELET # BLD AUTO: 161 THOUSANDS/UL (ref 149–390)
PMV BLD AUTO: 9.5 FL (ref 8.9–12.7)

## 2024-05-02 PROCEDURE — 97116 GAIT TRAINING THERAPY: CPT

## 2024-05-02 PROCEDURE — 97542 WHEELCHAIR MNGMENT TRAINING: CPT

## 2024-05-02 PROCEDURE — 97163 PT EVAL HIGH COMPLEX 45 MIN: CPT

## 2024-05-02 PROCEDURE — 92610 EVALUATE SWALLOWING FUNCTION: CPT | Performed by: NURSE PRACTITIONER

## 2024-05-02 PROCEDURE — 97530 THERAPEUTIC ACTIVITIES: CPT

## 2024-05-02 PROCEDURE — 97167 OT EVAL HIGH COMPLEX 60 MIN: CPT

## 2024-05-02 PROCEDURE — 99255 IP/OBS CONSLTJ NEW/EST HI 80: CPT | Performed by: PHYSICAL MEDICINE & REHABILITATION

## 2024-05-02 PROCEDURE — 83036 HEMOGLOBIN GLYCOSYLATED A1C: CPT

## 2024-05-02 PROCEDURE — 97110 THERAPEUTIC EXERCISES: CPT

## 2024-05-02 PROCEDURE — 85049 AUTOMATED PLATELET COUNT: CPT

## 2024-05-02 PROCEDURE — 97535 SELF CARE MNGMENT TRAINING: CPT

## 2024-05-02 PROCEDURE — 92523 SPEECH SOUND LANG COMPREHEN: CPT | Performed by: NURSE PRACTITIONER

## 2024-05-02 RX ORDER — POLYETHYLENE GLYCOL 3350 17 G/17G
17 POWDER, FOR SOLUTION ORAL DAILY PRN
Status: DISCONTINUED | OUTPATIENT
Start: 2024-05-02 | End: 2024-05-24 | Stop reason: HOSPADM

## 2024-05-02 RX ORDER — CLOBAZAM 10 MG/1
TABLET ORAL
Status: DISPENSED
Start: 2024-05-02 | End: 2024-05-02

## 2024-05-02 RX ORDER — DOCUSATE SODIUM 100 MG/1
100 CAPSULE, LIQUID FILLED ORAL 2 TIMES DAILY PRN
Status: DISCONTINUED | OUTPATIENT
Start: 2024-05-02 | End: 2024-05-24 | Stop reason: HOSPADM

## 2024-05-02 RX ADMIN — CLOBAZAM 5 MG: 10 TABLET ORAL at 09:24

## 2024-05-02 RX ADMIN — LACOSAMIDE 200 MG: 150 TABLET ORAL at 09:22

## 2024-05-02 RX ADMIN — AMLODIPINE BESYLATE 10 MG: 10 TABLET ORAL at 09:22

## 2024-05-02 RX ADMIN — LOSARTAN POTASSIUM 50 MG: 50 TABLET, FILM COATED ORAL at 09:22

## 2024-05-02 RX ADMIN — ATORVASTATIN CALCIUM 40 MG: 40 TABLET, FILM COATED ORAL at 18:11

## 2024-05-02 RX ADMIN — ENOXAPARIN SODIUM 40 MG: 40 INJECTION SUBCUTANEOUS at 09:23

## 2024-05-02 RX ADMIN — LEVETIRACETAM 2000 MG: 500 TABLET, FILM COATED ORAL at 09:21

## 2024-05-02 RX ADMIN — PRAMIPEXOLE DIHYDROCHLORIDE 0.25 MG: 0.12 TABLET ORAL at 18:11

## 2024-05-02 RX ADMIN — LEVETIRACETAM 2000 MG: 500 TABLET, FILM COATED ORAL at 20:49

## 2024-05-02 RX ADMIN — PRAMIPEXOLE DIHYDROCHLORIDE 0.25 MG: 0.12 TABLET ORAL at 09:22

## 2024-05-02 RX ADMIN — PRAMIPEXOLE DIHYDROCHLORIDE 0.25 MG: 0.12 TABLET ORAL at 20:49

## 2024-05-02 RX ADMIN — DEXAMETHASONE 4 MG: 4 TABLET ORAL at 09:19

## 2024-05-02 RX ADMIN — CLOBAZAM 5 MG: 10 TABLET ORAL at 18:12

## 2024-05-02 RX ADMIN — LACOSAMIDE 200 MG: 150 TABLET ORAL at 20:49

## 2024-05-02 RX ADMIN — PANTOPRAZOLE SODIUM 40 MG: 40 TABLET, DELAYED RELEASE ORAL at 05:34

## 2024-05-02 NOTE — ASSESSMENT & PLAN NOTE
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  Dexamethasone taking 4mg qday per prior providers recently for vasogenic edema (appears to be on 2mg BID prior at home based on chart review)   Patient is under the care of Dr. Langford for medical oncology and is to begin Avastin, chemotherapy radiation therapy for his recurrent mediastinal disease post rehabilitation.  Avastin Infusion 5/15/2024. Increased fatigue 5/17 which pt states is expected .  5/20 improved  Infusion center to set up chemo treatment   CBC, CMP, UA on Monday 5/13   Essentially unremarkable  Recent increased cough - comgmt per IM > improving  Covid/flu/RSV negative 5/13  CXR 5/14 - Right basilar linear opacity, likely atelectasis. Right upper lobectomy.  Encourage IS, flutter

## 2024-05-02 NOTE — NURSING NOTE
Max assist with toileting due to left sided weakness.  Mod amt brown stool on commode earlier.  Tolerated therapy.  Offers no complaints this afternoon.  Continue same plan of care.

## 2024-05-02 NOTE — ASSESSMENT & PLAN NOTE
New onset seizures presenting for 2024  CT head showing right frontal lobe vasogenic edema with known underlying lesion.    MRI brain showing a right superior frontal mass resection and chemoradiation with unchanged linear enhancement along the surgical cavity compared to 3/26/2024 favoring radiation necrosis.    Patient seen by neurology.    Video EEG showing: Early in recording there was frequent or nearly continuous focal motor seizure and later there were two subclinical right fronto central electrographic seizures.    Patient placed on the following AED regimen: Keppra 2 g twice daily, Vimpat 200 mg twice daily, Onfi 5 mg twice daily.  *Of note patient was significantly sedated with Ativan.  (Patient also on Mirapex 0.25mg TID presumable for tremors or RLS)  Continue seizure precautions     During ARC, no seizure-like activity thus far.  Patient to follow-up with neurology-epileptologist as an outpatient.

## 2024-05-02 NOTE — PROGRESS NOTES
HCC coding opportunities     **cancer audit    Chart reviewed, no opportunity found: CHART REVIEWED, NO OPPORTUNITY FOUND        Patients Insurance

## 2024-05-02 NOTE — PROGRESS NOTES
"[facility] SLP INITIAL EVALUATION    05/02/24 6070   Patient Data   Rehab Impairment istory of Presenting Illness Chart Review: \"Paras Pitts is a(n) 68 y.o. year old male who is admitted to [facility].  Patient originally presented to [facility] ED on 4/20/24 for new uncontrollable tremors of the left upper and lower extremity and weakness. Patient has history of stroke with chronic left sided weakness. Initial stroke work up negative. Neuro consulted. MRI showed no additional evidence of stroke/TIA. Neuro began seizure evaluation. Patient transferred to [place] for video EEG monitoring to guide AED escalation.  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 Neuro recommended radiation oncology consultation for further management in which they recommended increase in dexamethasone. AEDs optimized. 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))   Transportation    Prior IADL Participation   Money Management   (manages independently)   Meal Preparation Full Participation   Laundry Full Participation   Home Cleaning Full Participation   Prior Level of Function   Prior Assistance Needed for   (managed medications indendently.)   Patient Preference   Nicknamayaan (Patient Preference) [place]   Psychosocial   Psychosocial (WDL) WDL   Restrictions/Precautions   Precautions Aspiration;Bed/chair alarms;Cognitive;Fall Risk;Seizure;Supervision on toilet/commode   Pain Assessment   Pain Assessment Tool 0-10   Pain Score No Pain   Eating Assessment   Meal Assessed Snacks   Current Diet Regular;Thin   Intake Mode PO   Findings Pt observed to be coughing with lunch by staff ( consuming roast beef). Pt " "reported \"I took too large of a bite and it was a tough meat\". Pt reported the meat \"feeling stuck\"with 1 bite referring to throat region but cleared with a liquid wash. He confirms this was an isolated event. Will continue to monitor diet tolerance. In session today with regular solid snacks and thin liquids he was tolerating without difficulty obesrved or reported. Will follow up x1 meal tray to ensure diet tolerance.   Type of Assistance Needed Set-up / clean-up   Physical Assistance Level No physical assistance   Eating CARE Score 5   Comprehension   Assist Devices Glasses   Comprehension (FIM) 5 - Needs help/cues, repetition only RARELY ( < 10% of the time)   Expression   Expression (FIM) 5 - Needs help/cues only RARELY (< 10% of the time)   Social Interaction   Social Interaction (FIM) 5 - Interacts appropriately with others 90% of time   Problem Solving   Problem solving (FIM) 5 - Solves basic problems 90% of time   Memory   Memory (FIM) 5 - Recalls/performs request 90% of time   Sensation   Additional Comments Pt reports numbless L side ( baseline)   Cognitive Linguisitic Assessments   The Repeatable Battery for the Assessment of Neuropsychological Status (RBANS)  The Repeatable Battery for the Assessment of Neuropsychological Status (RBANS) is a brief, individually-administered assessment which measures attention, language, visuospatial/constructional abilities, and immediate & delayed memory. The RBANS is intended for use with adolescents to adults, ages 12 to 89 years. The following results were obtained during the administration of the assessment.    Form: A  “IE” indicates the scores from the initial evaluation (4/4/23). Form: A    Cognitive Domain/Subtest: Index Score: Percentile Rank: Classification: IE Index Score: Status:   IMMEDIATE MEMORY 81 10%ile Low Average 103 DECLINE        1. List Learning (21/40)          2. Story Memory (12/24)           VISUOSPATIAL/  CONSTRUCTIONAL 102 55%ile Average 109 " "NO CHANGE        3. Figure Copy (20/20)          4. Line Orientation (13/20)           LANGUAGE 82 12%ile Low Average 85 NO CHANGE        5. Picture Naming (10/10)          6. Semantic Fluency (8/40)           ATTENTION 82 12%ile Low Average 91 DECLINE        7. Digit Span (8/16)          8. Coding (28/89)           DELAYED MEMORY 101 53%ile Average 112 DECLINE        9. List Recall (5/10)          10. List Recognition (19/20)          11. Story Recall (7/12)          12. Figure Recall (13/20)           Sum of Index Scores:  448  500 DECLINE   Total Score:  85      Percentile: 16%ile      Classification: Low Average                  Cognition   Overall Cognitive Status Impaired   Orientation Level Oriented X4   Comments see results above.   Vision   Vision Comments reports previous double vision has resolved with medication change prior to hospitalization   Discharge Information   Patient's Discharge Plan return back home with wife ( wife travels at times to [facility] for work). Neighbors are supportive   Patient's Rehab Expectations \"get back on my mountain bike\"   SLP Therapy Minutes   SLP Time In 1330   SLP Time Out 1500   SLP Total Time (minutes) 90   SLP Mode of treatment - Individual (minutes) 90   SLP Mode of treatment - Concurrent (minutes) 0   SLP Mode of treatment - Group (minutes) 0   SLP Mode of treatment - Co-treat (minutes) 0   SLP Mode of Treatment - Total time(minutes) 90 minutes   SLP Cumulative Minutes 90   Cumulative Minutes   Cumulative therapy minutes 181       "

## 2024-05-02 NOTE — ASSESSMENT & PLAN NOTE
Stable   Worsening left hemiparesis likely related to recent radiation necrosis and vasogenic edema, seizures -and ARC program improving  Decadron for hx of vasogenic edema  Continue acute rehabilitation program to maximize function

## 2024-05-02 NOTE — NURSING NOTE
Patient resting in bed at this time.  No signs of distress noted.  Patient was able to stand pivot transfer with two assist onto the bedside commode overnight.  Left sided weakness continues.  Patient was able to reposition frequently to promote skin integrity.  Bed alarm in place to promote patient safety.  Call bell within reach.  Plan of care ongoing.

## 2024-05-02 NOTE — NURSING NOTE
Am bp during VS check 185/94. Patient was completing ADL during time. Medicine notified. AM bp meds given as ordered. BP on recheck at 1038 decreased to 121/84. Patient presently without c/o pain/cp/sob. Report given to oncoming RN.

## 2024-05-02 NOTE — ASSESSMENT & PLAN NOTE
Continue Norvasc 10 mg daily and losartan 50 mg daily (home dose)  Monitor BP and heart rate during rest and with activity  Internal medicine managing

## 2024-05-02 NOTE — PROGRESS NOTES
05/02/24 0700   Pain Assessment   Pain Assessment Tool 0-10   Pain Score No Pain   Restrictions/Precautions   Precautions Bed/chair alarms;Fall Risk;Supervision on toilet/commode;Seizure;Cognitive  (has port for chemo on R chest wall)   Weight Bearing Restrictions No   ROM Restrictions No   Eating   Type of Assistance Needed Set-up / clean-up   Physical Assistance Level No physical assistance   Eating CARE Score 5   Eating Assessment   Meal Assessed Breakfast   Opens Packages No   Oral Hygiene   Type of Assistance Needed Supervision   Physical Assistance Level No physical assistance   Comment seated at sink in w/c   Oral Hygiene CARE Score 4   Grooming   Able To Acquire Items;Comb/Brush Hair;Wash/Dry Face;Brush/Clean Teeth;Wash/Dry Hands   Limitation Noted In Coordination;Safety;Strength;Problem Solving   Shower/Bathe Self   Type of Assistance Needed Physical assistance;Verbal cues   Physical Assistance Level Total assistance   Comment verbal cues for thoroughness and encouragement, pt required assist from OTS to wash/dry buttocks while pt was standing with RW and assist from OT for balance.   Shower/Bathe Self CARE Score 1   Bathing   Assessed Bath Style Sponge Bath   Anticipated D/C Bath Style Shower;Sponge Bath   Able to Gather/Transport No   Able to Wash/Rinse/Dry (body part) Left Arm;Right Arm;L Upper Leg;L Lower Leg/Foot;R Upper Leg;R Lower Leg/Foot;Abdomen;Chest;Perineal Area  (increased time to wash RUE d/t decreased ROM/strength/coordination in LUE.)   Limitations Noted in Balance;Coordination;Endurance;ROM;Safety;Problem Solving;Strength   Positioning Seated;Standing   Tub/Shower Transfer   Reason Not Assessed Sponge Bath   Upper Body Dressing   Type of Assistance Needed Supervision  (seated in w/c)   Physical Assistance Level No physical assistance   Upper Body Dressing CARE Score 4   Lower Body Dressing   Type of Assistance Needed Physical assistance   Physical Assistance Level Total assistance    Comment assist x2; one for balance and one for clothing management. Pt instructed to step backwards to sit down in w/c, L knee buckled.   Lower Body Dressing CARE Score 1   Putting On/Taking Off Footwear   Type of Assistance Needed Supervision   Physical Assistance Level No physical assistance   Comment pt donned/doffed socks seated in w/c   Putting On/Taking Off Footwear CARE Score 4   Picking Up Object   Reason if not Attempted Safety concerns   Picking Up Object CARE Score 88   Dressing/Undressing Clothing   Remove UB Clothes Pullover Shirt  (hospital gown)   Don UB Clothes Pullover Shirt   Remove LB Clothes Socks   Don LB Clothes Pants;Undergarment;Socks   Limitations Noted In Coordination;Balance;Endurance;Problem Solving;Safety;Strength;ROM   Positioning Supported Sit;Standing   Roll Left and Right   Type of Assistance Needed Physical assistance;Verbal cues   Physical Assistance Level 25% or less   Comment Alek, verbal cues for log roll technique   Roll Left and Right CARE Score 3   Lying to Sitting on Side of Bed   Type of Assistance Needed Physical assistance   Physical Assistance Level Total assistance   Comment assist x2 for safety, OT manipulated legs and OTS manipulated trunk. Pt couldn't recall how he previously got out of bed when asked by OTS   Lying to Sitting on Side of Bed CARE Score 1   Sit to Stand   Type of Assistance Needed Physical assistance   Physical Assistance Level Total assistance   Comment assist x2; verbal cues for hand placement and safety. Pt leaned over RW to hold onto sink during transfer.   Sit to Stand CARE Score 1   Bed-Chair Transfer   Type of Assistance Needed Physical assistance   Physical Assistance Level Total assistance   Comment assist x2 for safety purposes   Chair/Bed-to-Chair Transfer CARE Score 1   Transfer Bed/Chair/Wheelchair   Limitations Noted In Balance;Confidence;Coordination;Endurance;Problem Solving;UE Strength;LE Strength   Toileting Hygiene   Comment pt  did not need to void during session   Toileting   Findings pt did not need to void during session   Toilet Transfer   Comment pt did not need to void during session   Cognition   Overall Cognitive Status Impaired   Arousal/Participation Alert;Responsive;Cooperative   Attention Attends with cues to redirect   Orientation Level Oriented X4   Memory Decreased recall of recent events;Decreased recall of precautions;Decreased short term memory   Following Commands Follows one step commands with increased time or repetition   Vision   Vision Comments   (wears glasses)   OT Therapy Minutes   OT Time In 0700   OT Time Out 0841   OT Total Time (minutes) 101   OT Mode of treatment - Individual (minutes) 101

## 2024-05-02 NOTE — ASSESSMENT & PLAN NOTE
Mood is much improved without emotional lability noted (week of 5/6/2024)  On admission at times was tearful and emotionally labile  May benefit from a future SSRI if continues

## 2024-05-02 NOTE — CONSULTS
PHYSICAL MEDICINE AND REHABILITATION   CONSULT NOTE  Paras Pitts 68 y.o. male MRN: [id number]  Unit/Bed#: -02 Encounter: [id number]      Rehab Diagnosis: Impairment of mobility, safety, Activities of Daily Living (ADLs), and cognitive/communication skills due to Brain Dysfunction:  02.1  Non-Traumatic  Etiologic Diagnosis: New onset epilepsy/New onset seizure disorder in the setting new right frontal lobe vasogenic edema and radiation necrosis related to metastatic adenocarcinoma right frontal brain mass    History of Present Illness:   Paras Pitts is a 68 y.o. 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  CT head showing right frontal lobe vasogenic edema with known underlying lesion.  MRI brain showing a right superior frontal mass resection and chemoradiation with unchanged linear enhancement along the surgical cavity compared to 3/26/2024 favoring radiation necrosis.  Patient seen by neurology.  Video EEG showing: Early in recording there was frequent or nearly continuous focal motor seizure and later there were two subclinical right fronto central electrographic seizures.  Patient placed on the following AED regimen: Keppra 2 g twice daily, Vimpat 200 mg twice daily, Onfi 5 mg twice daily.  Of note patient was significantly sedated with Ativan.   Patient to follow-up with epileptologist as an outpatient.  Patient  was seen by radiation oncology regarding edema and radiation necrosis and recommended to increase dexamethasone to 4 mg twice daily.  Patient is under the care of Dr. Langford for medical oncology and is to begin Avastin, chemotherapy radiation therapy for his recurrent mediastinal disease post rehabilitation.  After medical stabilization, patient found to have acute functional deficits from his mobility and self-care, therefore admitted to [facility] for acute inpatient rehabilitation.      Plan:     Rehabilitation  Functional deficits: Left hemiparesis, left inattention, impaired balance, impaired mobility, self care  Begin PT/OT/SLP.  Rehabilitation goals are to achieve a supervision-modified independent level with mobility and self care.  Independent with cognition speech and swallow.  Prognosis is fair.  ELOS is 2-3 weeks.  Estimated discharge is home.     DVT prophylaxis  Continue subcutaneous Lovenox 40 mg daily    Bladder plan  Continent and voiding  Will check PVR    Bowel plan  Continent  Multiple bowel movements today therefore bowel regimen was changed to as needed  BM 5/2/2024    Code Status  Full code      * Seizure disorder (HCC)  Assessment & Plan  New onset seizures presenting for 2024  CT head showing right frontal lobe vasogenic edema with known underlying lesion.    MRI brain showing a right superior frontal mass resection and chemoradiation with unchanged linear enhancement along the surgical cavity compared to 3/26/2024 favoring radiation necrosis.    Patient seen by neurology.    Video EEG showing: Early in recording there was frequent or nearly continuous focal motor seizure and later there were two subclinical right fronto central electrographic seizures.    Patient placed on the following AED regimen: Keppra 2 g twice daily, Vimpat 200 mg twice daily, Onfi 5 mg twice daily.  *Of note patient was significantly sedated with Ativan.  Continue seizure precautions     Patient to follow-up  with neurology-epileptologist as an outpatient.    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  Dexamethasone was increased to 4 mg twice daily.    Patient is under the care of Dr. Langford for medical oncology and is to begin Avastin, chemotherapy radiation therapy for his recurrent mediastinal disease post rehabilitation.    Left hemiparesis (HCC)  Assessment & Plan  Worsening left hemiparesis likely related to recent radiation necrosis and vasogenic edema, seizures  Continue Decadron 4 mg twice daily  Continue acute rehabilitation program to maximize function      Brain mass  Assessment & Plan  Patient with known metastatsis to right frontal lobe s/p right frontal craniotomy in March 2023  Residual mild left hemiparesis but remained highly functional and independent.  Patient status post chemotherapy radiation SRT, immunotherapy  Follows with Dr. Langford    Adjustment disorder  Assessment & Plan  May benefit from a neuropsychology consultation if patient is agreeable    Pseudobulbar affect  Assessment & Plan  At times can be tearful and emotionally labile  May benefit from a future SSRI  Will monitor      History of CVA (cerebrovascular accident)  Assessment & Plan  Patient with ischemic CVA in 2011  Resultant mild left hemiparesis    Hypercholesterolemia  Assessment & Plan  Continue Lipitor 40 mg daily (home dose)    Essential hypertension  Assessment & Plan  Continue Norvasc 10 mg daily and losartan 50 mg daily (home dose)  Monitor BP and heart rate during rest and with activity  Has been slightly elevated at times  therefore checking manually  Internal medicine managing    Overweight (BMI 25.0-29.9)  Assessment & Plan  Dietary and lifestyle changes when able        Subjective:   Patient seen face-to-face.  Motivated to get better.  Patient is awake and alert and is aware of his situation.  Can be tearful at times and may have pseudobulbar affect.  Denies current headache.    Review of Systems   Constitutional: Negative.    HENT: Negative.     Eyes: Negative.    Respiratory: Negative.     Cardiovascular: Negative.    Gastrointestinal: Negative.    Endocrine: Negative.    Genitourinary: Negative.    Musculoskeletal: Negative.    Skin: Negative.    Allergic/Immunologic: Negative.    Neurological: Negative.    Hematological: Negative.    Psychiatric/Behavioral: Negative.           Function:  PRIOR LEVEL OF FUNCTION:  He lives in a(n) single family home  Paras Pitts is  and lives with their spouse.  Self Care: Independent, Indoor Mobility: Modified Independent, Stairs (in/outdoor): Modified Independent, and Cognition: Independent  Prior to patient's admission, patient was fully Independent with ADLs and IADLs, including driving. He was Modified Independent with occasional use of SPC for mobility.      HOME ENVIRONMENT:  The living area: can live on one level  There are 4-5 steps to enter the home, with full flight of stairs inside the home. Patient can D/C home at WC level if needed.  The patient will have 24 hour supervision/physical assistance available upon discharge.  Patient's spouse works during the day in [place] every other week, however is able to work from home if needed. Spouse is also able to take time off from work if needed. Additionally, they have very supportive friends/neighbors that can provide assistance, including physical assist.     Current level of function:  Physical Therapy: Min-mod assist with bed mobility, min-mod assist with stand pivot transfers, mod-max assist to ambulate 3-10 feet x 2  Occupational  "Therapy: Supervision with eating, mod assist with grooming, mod-max assist with upper body ADLs, mod-max assist with lower body ADLs, max assist with toileting    Physical Exam:  /84 (BP Location: Left arm)   Pulse 80   Temp 97.7 °F (36.5 °C) (Temporal)   Resp 18   Ht 5' 9\" (1.753 m)   Wt 91.6 kg (201 lb 15.1 oz)   SpO2 97%   BMI 29.82 kg/m²        Intake/Output Summary (Last 24 hours) at 5/2/2024 1445  Last data filed at 5/2/2024 1310  Gross per 24 hour   Intake 480 ml   Output 150 ml   Net 330 ml       Body mass index is 29.82 kg/m².      Physical Exam  Vitals and nursing note reviewed.   Constitutional:       General: He is not in acute distress.  HENT:      Head: Normocephalic and atraumatic.      Nose: Nose normal.      Mouth/Throat:      Mouth: Mucous membranes are moist.   Eyes:      Conjunctiva/sclera: Conjunctivae normal.   Cardiovascular:      Rate and Rhythm: Normal rate and regular rhythm.      Pulses: Normal pulses.   Pulmonary:      Effort: Pulmonary effort is normal.      Breath sounds: Normal breath sounds. No wheezing or rales.   Abdominal:      General: Bowel sounds are normal. There is no distension.      Palpations: Abdomen is soft.      Tenderness: There is no abdominal tenderness.   Musculoskeletal:         General: No swelling.      Cervical back: Neck supple.   Skin:     General: Skin is warm.   Neurological:      Mental Status: He is alert and oriented to person, place, and time.      Comments: Speech is fluent  Can be tearful and emotional at times  Following commands but sometimes requires repetition  Left inattention versus left neglect present  Sensation to light touch intact  Proprioception intact  Motor exam:  RUE/RLE: 4+/5-5/5 throughout  LUE: 3+/5 SAB, elbow flexion, 3 -/5 elbow extension, wrist extension,   LLE: 2+/5 HF, knee extension, knee flexion, 1/5 dorsiflexion, 2+/5 plantarflexion     Psychiatric:         Mood and Affect: Mood normal.          Labs, " medications, and imaging personally reviewed.    Laboratory:    Lab Results   Component Value Date    SODIUM 141 04/28/2024    K 3.7 04/28/2024     04/28/2024    CO2 29 04/28/2024    BUN 17 04/28/2024    CREATININE 0.80 04/28/2024    GLUC 142 (H) 04/28/2024    CALCIUM 8.9 04/28/2024     Lab Results   Component Value Date    WBC 7.71 04/23/2024    HGB 13.6 04/23/2024    HCT 41.2 04/23/2024    MCV 93 04/23/2024     05/02/2024     Lab Results   Component Value Date    INR 0.92 04/20/2024    INR 0.98 08/30/2023    INR 1.06 03/24/2023    PROTIME 12.6 04/20/2024    PROTIME 13.2 08/30/2023    PROTIME 14.0 03/24/2023         Current Facility-Administered Medications:     acetaminophen (TYLENOL) tablet 975 mg, 975 mg, Oral, Q8H PRN, Jenise Dawson PA-C    aluminum-magnesium hydroxide-simethicone (MAALOX) oral suspension 30 mL, 30 mL, Oral, Q6H PRN, Jenise Dawson PA-C    amLODIPine (NORVASC) tablet 10 mg, 10 mg, Oral, Daily, Jenise Dawson PA-C, 10 mg at 05/02/24 0922    atorvastatin (LIPITOR) tablet 40 mg, 40 mg, Oral, After Dinner, Jenise Dawson PA-C, 40 mg at 05/01/24 1909    bisacodyl (DULCOLAX) rectal suppository 10 mg, 10 mg, Rectal, Daily PRN, Jenise Dawson PA-C    cloBAZam (ONFI) tablet 5 mg, 5 mg, Oral, BID, Jenise Dawson PA-C, 5 mg at 05/02/24 0924    dexamethasone (DECADRON) tablet 4 mg, 4 mg, Oral, Daily, Jenise Dawson PA-C, 4 mg at 05/02/24 0919    docusate sodium (COLACE) capsule 100 mg, 100 mg, Oral, BID PRN, Corina Arita MD    enoxaparin (LOVENOX) subcutaneous injection 40 mg, 40 mg, Subcutaneous, Daily, Jenise Dawson PA-C, 40 mg at 05/02/24 0923    lacosamide (VIMPAT) tablet 200 mg, 200 mg, Oral, Q12H LEN, Jenise Dawson PA-C, 200 mg at 05/02/24 0922    levETIRAcetam (KEPPRA) tablet 2,000 mg, 2,000 mg, Oral, Q12H LEN, Jenise Dawson PA-C, 2,000 mg at 05/02/24 0921    losartan (COZAAR) tablet 50  mg, 50 mg, Oral, Daily, Jenise Dawson PA-C, 50 mg at 05/02/24 0922    ondansetron (ZOFRAN-ODT) dispersible tablet 4 mg, 4 mg, Oral, Q6H PRN, Jenise Dawson PA-C    pantoprazole (PROTONIX) EC tablet 40 mg, 40 mg, Oral, Early Morning, Jenise Dawson PA-C, 40 mg at 05/02/24 0534    polyethylene glycol (MIRALAX) packet 17 g, 17 g, Oral, Daily PRN, Corina Arita MD    pramipexole (MIRAPEX) tablet 0.25 mg, 0.25 mg, Oral, TID, Jenise Dawson PA-C, 0.25 mg at 05/02/24 0922    No Known Allergies     Patient Active Problem List    Diagnosis Date Noted    Seizure disorder (HCC) 05/02/2024    Metastatic adenocarcinoma (HCC) 2023    Left hemiparesis (HCC) 05/02/2024    Brain mass 03/20/2023    Frontal mass of brain 05/02/2024    Pseudobulbar affect 05/02/2024    Adjustment disorder 05/02/2024    Constipation by delayed colonic transit 04/25/2024    Seizure-like activity (HCC) 04/23/2024    Generalized weakness 04/20/2024    Tremors of nervous system 04/20/2024    History of CVA (cerebrovascular accident) 06/22/2023    Chemotherapy-induced neutropenia (HCC) 05/03/2023    Encounter for central line care 05/01/2023    Carcinoma of right lung (HCC) 04/13/2023    Lung nodule 03/21/2023    Hypercholesterolemia 09/20/2021    Essential hypertension 09/11/2020    Annual physical exam 09/11/2020    Negative depression screening 02/24/2020    Overweight (BMI 25.0-29.9) 02/24/2020     Past Medical History:   Diagnosis Date    Brain compression (HCC) 03/20/2023    Brain mass 03/20/2023    Cancer (HCC) 2001    Receint lung and brain lesions and prostate cancer in 2001    Cerebral edema (HCC) 03/20/2023    Hypertension     Prostate cancer (HCC) 2001    Rectal bleeding     Stroke (HCC)     2011       Past Surgical History:   Procedure Laterality Date    COLONOSCOPY      CRANIOTOMY Right 3/23/2023    Procedure: Right frontal CRANIOTOMY IMAGE-GUIDED FOR TUMOR;  Surgeon: Clark Carrillo MD;  Location: [facility];  Service: Neurosurgery    ENDOBRONCHIAL ULTRASOUND (EBUS) N/A 2024    Procedure: ENDOBRONCHIAL ULTRASOUND (EBUS);  Surgeon: Kalia Sparrow MD;  Location[facility];  Service: Thoracic    IR PORT PLACEMENT  2023    DE [facility] INCL FLUOR GDNCE DX W/CELL WASHG SPX N/A 2023    Procedure: BRONCHOSCOPY FLEXIBLE;  Surgeon: Kalia Sparrow MD;  Location[facility];  Service: Thoracic    [facility] W/CELL WASHG SPX N/A 2024    Procedure: BRONCHOSCOPY FLEXIBLE;  Surgeon: Kalia Sparrow MD;  [facility];  Service: Thoracic    DE CYSTOURETHROSCOPY N/A 3/23/2023    Procedure: EUA, DEL CASTILLO INSERTION;  Surgeon: Ajay Piedra MD;  [facility];  Service: Urology    DE THORACOSCOPY W/LOBECTOMY SINGLE LOBE Right 2023    Procedure: robotic assisted converted to open right upper lobectomy, lymph node dissection;  Surgeon: Kalia Sparrow MD;  [facility];  Service: Thoracic    PROSTATECTOMY  2001    THORACOTOMY Right 2023    Procedure: right thoracotomy with Cryo-Ablation;  Surgeon: Kalia Sparrow MD;  [facility];  Service: Thoracic    TONSILLECTOMY       Social History     Socioeconomic History    Marital status: /Civil Union     Spouse name: Not on file    Number of children: Not on file    Years of education: Not on file    Highest education level: Not on file   Occupational History    Not on file   Tobacco Use    Smoking status: Former     Current packs/day: 0.00     Average packs/day: 1 pack/day for 15.0 years (15.0 ttl pk-yrs)     Types: Cigarettes     Start date:      Quit date:      Years since quittin.3     Passive exposure: Never    Smokeless tobacco: Never    Tobacco comments:     i quit when i was 28. not sure of exact dates   Vaping Use    Vaping status: Never Used   Substance and Sexual Activity    Alcohol use: Not Currently     Alcohol/week: 1.0 standard drink of alcohol     Types: 1 Shots of  liquor per week     Comment: socially    Drug use: Yes     Types: Marijuana     Comment: gummies foro nausea with chemo    Sexual activity: Not Currently   Other Topics Concern    Not on file   Social History Narrative    Not on file     Social Determinants of Health     Financial Resource Strain: Not on file   Food Insecurity: No Food Insecurity (2024)    Hunger Vital Sign     Worried About Running Out of Food in the Last Year: Never true     Ran Out of Food in the Last Year: Never true   Transportation Needs: No Transportation Needs (2024)    PRAPARE - Transportation     Lack of Transportation (Medical): No     Lack of Transportation (Non-Medical): No   Physical Activity: Not on file   Stress: Not on file   Social Connections: Not on file   Intimate Partner Violence: Not on file   Housing Stability: Low Risk  (2024)    Housing Stability Vital Sign     Unable to Pay for Housing in the Last Year: No     Number of Places Lived in the Last Year: 1     Unstable Housing in the Last Year: No     Social History     Tobacco Use   Smoking Status Former    Current packs/day: 0.00    Average packs/day: 1 pack/day for 15.0 years (15.0 ttl pk-yrs)    Types: Cigarettes    Start date:     Quit date:     Years since quittin.3    Passive exposure: Never   Smokeless Tobacco Never   Tobacco Comments    i quit when i was 28. not sure of exact dates     Social History     Substance and Sexual Activity   Alcohol Use Not Currently    Alcohol/week: 1.0 standard drink of alcohol    Types: 1 Shots of liquor per week    Comment: socially     Family History   Problem Relation Age of Onset    Dementia Mother             Breast cancer Sister             Prostate cancer Brother         Received Radiation         Medical Necessity Criteria for [facility] Admission: Hypertension, Bowel/Bladder Management, Incision/Wound care, Seizures, and acute pain, monitoring of mood, headaches . In addition, the  preadmission screen, post-admission physical evaluation, overall plan of care and admissions order demonstrate a reasonable expectation that the following criteria were met at the time of admission to the [facility].  The patient requires active and ongoing therapeutic intervention of multiple therapy disciplines (physical therapy, occupational therapy, speech-language pathology, or prosthetics/orthotics), one of which is physical or occupational therapy.    Patient requires an intensive rehabilitation therapy program, as defined in Chapter 1, section 110.2.2 of the CMS Medicare Policy Manual. This intensive rehabilitation therapy program will consist of at least 3 hours of therapy per day at least 5 days per week or at least 15 hours of intensive rehabilitation therapy within a 7 consecutive day period, beginning with the date of admission to the [facility].    The patient is reasonably expected to actively participate in, and benefit significantly from, the intensive rehabilitation therapy program as defined in Chapter 1, section 110.2.2 of the CMS Medicare Policy Manual at this time of admission to the [facility]. He can reasonably be expected to make measurable improvement (that will be of practical value to improve the patient’s functional capacity or adaptation to impairments) as a result of the rehabilitation treatment, as defined in section 110.3, and such improvement can be expected to be made within the prescribed period of time. As noted in the CMS Medicare Policy Manual, the patient need not be expected to achieve complete independence in the domain of self-care nor be expected to return to his or her prior level of functioning in order to meet this standard.  The patient must require physician supervision by a rehabilitation physician. As such, a rehabilitation physician will conduct face-to-face visits with the patient at least 3 days per week throughout the patient’s stay in the [facility] to assess the patient both medically  and functionally, as well as to modify the course of treatment as needed to maximize the patient’s capacity to benefit from the rehabilitation process.  The patient requires an intensive and coordinated interdisciplinary approach to providing rehabilitation, as defined in Chapter 1, section 110.2.5 of the CMS Medicare Policy Manual. This will be achieved through periodic team conferences, conducted at least once in a 7-day period, and comprising of an interdisciplinary team of medical professionals consisting of: a rehabilitation physician, registered nurse,  and/or , and a licensed/certified therapist from each therapy discipline involved in treating the patient.     Changes Since Pre-admission Assessment: None -This patient's participation in rehab continues to be reasonable, necessary and appropriate.    CMS Required Post-Admission Physician Evaluation Elements  History and Physical, including medical history, functional history and active comorbidities as in above text.       Post-Admission Physician Evaluation:  The patient has the potential to make improvement and is in need of physical, occupational, and/or therapy services. The patient may also need nutritional services. Given the patient's complex medical condition and risk of further medical complications, rehabilitative services cannot be safely provided at a lower level of care, such as a skilled nursing facility. I have reviewed the patient's functional and medical status at the time of the preadmission screening and they are the same as on the day of this admission. I acknowledge that I have personally performed a full physical examination on this patient within 24 hours of admission. The patient and/or family demonstrated understanding the rehabilitation program and the discharge process after we discussed them.     Agree in entirety: yes  Minor adaptions: none    Major changes: none    Corina Arita MD    ** Please  Note: Fluency Direct voice to text software may have been used in the creation of this document. **      I have spent a total time of 90 minutes on 05/02/24 in caring for this patient including Diagnostic results, Prognosis, Risks and benefits of tx options, Instructions for management, Patient and family education, Importance of tx compliance, Risk factor reductions, Impressions, Counseling / Coordination of care, Documenting in the medical record, Reviewing / ordering tests, medicine, procedures  , Obtaining or reviewing history  , and Communicating with other healthcare professionals  .      Hyperlipidemia

## 2024-05-02 NOTE — H&P
Paras Pitts  :1955 M  MRN:[id number]    [facility]:[id number]  Adm Date: 2024  5:56 PM   ATT PHY: Omer Andrade Md  [facility]         Chief Complaint: Tremors of the nervous system      History of Presenting Illness: Paras Pitts is a(n) 68 y.o. year old male who is admitted to [facility].  Patient originally presented to [facility] ED on 24 for new uncontrollable tremors of the left upper and lower extremity and weakness. Patient has history of stroke with chronic left sided weakness. Initial stroke work up negative. Neuro consulted. MRI showed no additional evidence of stroke/TIA. Neuro began seizure evaluation. Patient transferred to [facility] for video EEG monitoring to guide AED escalation.  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 Neuro recommended radiation oncology consultation for further management in which they recommended increase in dexamethasone. AEDs optimized. PT OT consulted and recommended inpatient acute rehab until next round of chemo.     Patient examined at bedside.  Patient has no acute symptoms. He believes his tremors are currently controlled. States he occasionally gets headaches with certain positions laying down but denies any headache currently.     Platelet count 161 on 24.     No Known Allergies    Current Facility-Administered Medications on File Prior to Encounter   Medication Dose Route Frequency Provider Last Rate Last Admin    [DISCONTINUED] acetaminophen (TYLENOL) tablet 975 mg  975 mg Oral Q8H PRN George Burnett MD   975 mg at 24 0336    [DISCONTINUED] aluminum-magnesium hydroxide-simethicone (MAALOX) oral suspension 30 mL  30 mL Oral Q6H PRN George Burnett MD        [DISCONTINUED] amLODIPine (NORVASC) tablet 10 mg  10 mg Oral  Daily George Burnett MD   10 mg at 05/01/24 0945    [DISCONTINUED] atorvastatin (LIPITOR) tablet 40 mg  40 mg Oral After Dinner George Burnett MD   40 mg at 04/30/24 1742    [DISCONTINUED] bisacodyl (DULCOLAX) rectal suppository 10 mg  10 mg Rectal Daily PRN George Burnett MD   10 mg at 04/25/24 0900    [DISCONTINUED] cloBAZam (ONFI) tablet 5 mg  5 mg Oral BID Syedsaad Silvestremyer DO   5 mg at 05/01/24 0945    [DISCONTINUED] dexamethasone (DECADRON) tablet 4 mg  4 mg Oral Daily Abhijeet Babcock MD   4 mg at 05/01/24 0945    [DISCONTINUED] enoxaparin (LOVENOX) subcutaneous injection 40 mg  40 mg Subcutaneous Daily George Burnett MD   40 mg at 05/01/24 0944    [DISCONTINUED] lacosamide (VIMPAT) tablet 200 mg  200 mg Oral Q12H LEN Syed Rubio, DO   200 mg at 05/01/24 0945    [DISCONTINUED] levETIRAcetam (KEPPRA) tablet 2,000 mg  2,000 mg Oral Q12H LEN Syed Silvestremyer, DO   2,000 mg at 05/01/24 0945    [DISCONTINUED] losartan (COZAAR) tablet 50 mg  50 mg Oral Daily George Burnett MD   50 mg at 05/01/24 0945    [DISCONTINUED] ondansetron (ZOFRAN-ODT) dispersible tablet 4 mg  4 mg Oral Q6H PRN George Burnett MD        [DISCONTINUED] pantoprazole (PROTONIX) EC tablet 40 mg  40 mg Oral Early Morning George Burnett MD   40 mg at 05/01/24 0548    [DISCONTINUED] polyethylene glycol (MIRALAX) packet 17 g  17 g Oral Daily George Burnett MD   17 g at 04/30/24 0922    [DISCONTINUED] pramipexole (MIRAPEX) tablet 0.25 mg  0.25 mg Oral TID George Burnett MD   0.25 mg at 05/01/24 1507    [DISCONTINUED] senna (SENOKOT) tablet 8.6 mg  1 tablet Oral HS George Burnett MD   8.6 mg at 04/30/24 2131     Current Outpatient Medications on File Prior to Encounter   Medication Sig Dispense Refill    amLODIPine (NORVASC) 10 mg tablet Take 1 tablet (10 mg total) by mouth daily 90 tablet 0    atorvastatin (LIPITOR) 40 mg tablet Take 1 tablet (40 mg total) by mouth  daily after dinner 30 tablet 0    cloBAZam (ONFI) 10 MG tablet Take 0.5 tablets (5 mg total) by mouth 2 (two) times a day for 10 days      dexamethasone (DECADRON) 4 mg tablet Take 1 tablet (4 mg total) by mouth daily      gabapentin (Neurontin) 300 mg capsule Take 1 capsule (300 mg total) by mouth 3 (three) times a day 90 capsule 1    lacosamide (VIMPAT) 200 mg tablet Take 1 tablet (200 mg total) by mouth every 12 (twelve) hours for 10 days      levETIRAcetam (KEPPRA) 1000 MG tablet Take 2 tablets (2,000 mg total) by mouth every 12 (twelve) hours      losartan (COZAAR) 50 mg tablet Take 1 tablet (50 mg total) by mouth daily 90 tablet 0    pantoprazole (PROTONIX) 40 mg tablet Take 1 tablet (40 mg total) by mouth daily 60 tablet 0    polyethylene glycol (MIRALAX) 17 g packet Take 17 g by mouth daily      pramipexole (MIRAPEX) 0.25 mg tablet Take 1 tablet (0.25 mg total) by mouth 3 (three) times a day      senna (SENOKOT) 8.6 mg Take 1 tablet (8.6 mg total) by mouth daily at bedtime         Active Ambulatory Problems     Diagnosis Date Noted    Negative depression screening 02/24/2020    Overweight (BMI 25.0-29.9) 02/24/2020    Essential hypertension 09/11/2020    Annual physical exam 09/11/2020    Hypercholesterolemia 09/20/2021    Lung nodule 03/21/2023    Metastatic adenocarcinoma (HCC) 2023    Carcinoma of right lung (HCC) 04/13/2023    Encounter for central line care 05/01/2023    Chemotherapy-induced neutropenia (HCC) 05/03/2023    History of CVA (cerebrovascular accident) 06/22/2023    Generalized weakness 04/20/2024    Tremors of nervous system 04/20/2024    Seizure-like activity (HCC) 04/23/2024    Constipation by delayed colonic transit 04/25/2024     Resolved Ambulatory Problems     Diagnosis Date Noted    CVA (cerebral vascular accident) (HCC) 08/12/2021    Brain mass 03/20/2023    Brain compression (HCC) 03/20/2023    Cerebral edema (HCC) 03/20/2023    Mcclelland catheter in place 03/24/2023     Past Medical  History:   Diagnosis Date    Cancer (HCC) 2001    Hypertension     Prostate cancer (HCC) 2001    Rectal bleeding     Stroke (HCC)        Past Surgical History:   Procedure Laterality Date    COLONOSCOPY      CRANIOTOMY Right 3/23/2023    Procedure: Right frontal CRANIOTOMY IMAGE-GUIDED FOR TUMOR;  Surgeon: Clark Carrillo MD;  [facility];  Service: Neurosurgery    ENDOBRONCHIAL ULTRASOUND (EBUS) N/A 4/16/2024    Procedure: ENDOBRONCHIAL ULTRASOUND (EBUS);  Surgeon: Kalia Sparrow MD;  [facility];  Service: Thoracic    IR PORT PLACEMENT  4/27/2023    DC [facility] INCL FLUOR GDNCE DX W/CELL WASHG SPX N/A 9/1/2023    Procedure: BRONCHOSCOPY FLEXIBLE;  Surgeon: Kalia Sparrow MD;  [facility];  Service: Thoracic    [facility] DX W/CELL WASHG SPX N/A 4/16/2024    Procedure: BRONCHOSCOPY FLEXIBLE;  Surgeon: Kalia Sparrow MD;  [facility];  Service: Thoracic    DC CYSTOURETHROSCOPY N/A 3/23/2023    Procedure: EUA, DEL CASTILLO INSERTION;  Surgeon: Ajay Piedra MD;  [facility];  Service: Urology    DC THORACOSCOPY W/LOBECTOMY SINGLE LOBE Right 9/1/2023    Procedure: robotic assisted converted to open right upper lobectomy, lymph node dissection;  Surgeon: Kalia Sparrow MD;  [facility];  Service: Thoracic    PROSTATECTOMY  2001    THORACOTOMY Right 9/1/2023    Procedure: right thoracotomy with Cryo-Ablation;  Surgeon: Kalia Sparrow MD;  [facility];  Service: Thoracic    TONSILLECTOMY         Social History:   Social History     Socioeconomic History    Marital status: /Civil Union     Spouse name: None    Number of children: None    Years of education: None    Highest education level: None   Occupational History    None   Tobacco Use    Smoking status: Former     Current packs/day: 0.00     Average packs/day: 1 pack/day for 15.0 years (15.0 ttl pk-yrs)     Types: Cigarettes     Start date: 1978     Quit date: 1993      "Years since quittin.3     Passive exposure: Never    Smokeless tobacco: Never    Tobacco comments:     i quit when i was 28. not sure of exact dates   Vaping Use    Vaping status: Never Used   Substance and Sexual Activity    Alcohol use: Not Currently     Alcohol/week: 1.0 standard drink of alcohol     Types: 1 Shots of liquor per week     Comment: socially    Drug use: Yes     Types: Marijuana     Comment: gummies foro nausea with chemo    Sexual activity: Not Currently   Other Topics Concern    None   Social History Narrative    None     Social Determinants of Health     Financial Resource Strain: Not on file   Food Insecurity: No Food Insecurity (2024)    Hunger Vital Sign     Worried About Running Out of Food in the Last Year: Never true     Ran Out of Food in the Last Year: Never true   Transportation Needs: No Transportation Needs (2024)    PRAPARE - Transportation     Lack of Transportation (Medical): No     Lack of Transportation (Non-Medical): No   Physical Activity: Not on file   Stress: Not on file   Social Connections: Not on file   Intimate Partner Violence: Not on file   Housing Stability: Low Risk  (2024)    Housing Stability Vital Sign     Unable to Pay for Housing in the Last Year: No     Number of Places Lived in the Last Year: 1     Unstable Housing in the Last Year: No       Family History:   Family History   Problem Relation Age of Onset    Dementia Mother             Breast cancer Sister             Prostate cancer Brother         Received Radiation       Review of Systems    Physical Exam   Vitals: Blood pressure 121/84, pulse 80, temperature 97.7 °F (36.5 °C), temperature source Temporal, resp. rate 18, height 5' 9\" (1.753 m), weight 91.6 kg (201 lb 15.1 oz), SpO2 97%.,Body mass index is 29.82 kg/m².  Constitutional: Awake, alert, in no acute distress. In wheelchair.   Head: Healed scar from previous craniotomy.   Mouth/Throat: Oropharynx is clear and " moist.    Eyes: Conjunctivae and EOM are normal.   Neck: Neck supple. No thyromegaly present.   Cardiovascular: Normal rate, regular rhythm and normal heart sounds.    Pulmonary/Chest: Effort normal and breath sounds normal.   Abdominal: Soft. Bowel sounds are normal. There is no tenderness. There is no rebound and no guarding.   Neurological: Chronic left hemiparesis. No tremors.   Musculoskeletal:  Nontender spine.  Skin: Skin is warm and dry. No edema.     Assessment     Paras Pitts is a(n) 68 y.o. male with tremors of     Cardiac Hx w/ HTN, HLD, hx of CVA. Continue amlodipine 10 mg daily, Losartan 50 mg daily, atorvastatin 40 mg daily.  Prediabetes. Hgb A1c 5.9% on 3/22/23. Repeat A1c. Carb controlled diet.  GERD. Continue Protonix 40 mg daily.  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 Stage IV A. S/p robotic converted to right thoracotomy and right upper lobectomy on 9/1/2023. S/p bronchoscopy with EBUS at [facility] on 4/16/2024. Continue Decadron 4 mg daily. Patient [facility] 4/29. Further radiation treatment in 1-2 weeks per oncologist, Dr. Contreras.  Pain and bowel regimen. As per physiatry.  Tremors of the nervous system. Continue Keppra twice daily Vimpat 100 mg twice daily, clobazam 0.5 mg twice daily. Follow up in 4 weeks with epilepsy attending. Patient receiving intensive PT OT as per physiatry.     Prognosis: Fair.    Discharge Plan: In progress.    Advanced Directives: I have discussed in detail with the patient the advanced directives. The patient's spouse, Bossman Pastor, [phone], is appointed as POA and has his living will. When discussing cardiac and pulmonary resuscitation efforts with the patient, the patient wishes to be  FULL CODE.    I have spent more than 50 minutes gathering data, doing physical examination, and discussing the advanced directives, which was witnessed by caring staff.    The patient was discussed with Dr. Andrade and he is in  agreement with the above note.

## 2024-05-02 NOTE — PROGRESS NOTES
"   05/02/24 1029   Patient Data   Rehab Impairment Impairment of mobility, safety, Activities of Daily Living (ADLs), and cognitive/communication skills due to Brain Dysfunction:  02.1  Non-Traumatic   Etiologic Diagnosis New onset epilepsy/New onset seizure disorder in the setting new right frontal lobe vasogenic edema and radiation necrosis related to metastatic adenocarcinoma right frontal brain mass.  Patient with history of stage 4 lung cancer with brain mets s/p right frontal craniotomy 3/23/23;   Support System   Name Tiara Ham Wife   Health Status \"good\"   Able to provide 24 hour supervision No  (works in [place])   Able to provide physical help? Yes   Multiple Support Systems   (patient reports several neighbors can help out)   Home Setup   Type of Home Multi Level;Single Level   Method of Entry Stairs;Hand Rail Left  (at pt's residence; also has as 1 floor studio apartment with curb step to enter)   Number of Stairs 4   Number of Stairs in Home   (4 + landing+ 10)   In Home Hand Rail Right   Available Equipment Single Point Cane   Prior Level of Function   Indoor-Mobility (Ambulation) 3. Independent - Patient completed the activities by him/herself, with or without an assistive device, with no assistance from a helper.   Stairs 3. Independent - Patient completed the activities by him/herself, with or without an assistive device, with no assistance from a helper.   Prior Device Used Z. None of the above  (occasional use of SPC in the community)   Patient Preference   Nickname (Patient Preference) Eugene   Restrictions/Precautions   Precautions Bed/chair alarms;Fall Risk;Supervision on toilet/commode;Seizure;Cognitive  (port in R chest wall for chemo)   Pain Assessment   Pain Assessment Tool 0-10   Pain Score No Pain   Transfer Bed/Chair/Wheelchair   Positioning Concerns Hemiplegia;Cognition  (inattention to L side)   Limitations Noted In Balance;Confidence;Coordination;Endurance;Problem Solving;UE " Strength;LE Strength   Type of Assistance Needed Physical assistance;Verbal cues   Physical Assistance Level 26%-50%   Comment mod A sit pivot wheelchair<>mat table; did not asses SPT with AD   Chair/Bed-to-Chair Transfer CARE Score 3   Roll Left and Right   Type of Assistance Needed Physical assistance;Verbal cues   Physical Assistance Level 26%-50%   Comment min A  with verbal cues   Roll Left and Right CARE Score 3   Sit to Lying   Type of Assistance Needed Physical assistance;Verbal cues   Physical Assistance Level 51%-75%   Comment mod-max A   Sit to Lying CARE Score 2   Lying to Sitting on Side of Bed   Type of Assistance Needed Physical assistance;Verbal cues   Physical Assistance Level 51%-75%   Comment mod-max A   Lying to Sitting on Side of Bed CARE Score 2   Sit to Stand   Type of Assistance Needed Physical assistance;Verbal cues   Physical Assistance Level 26%-50%   Comment mod A using wall rail; no LLE buckling   Sit to Stand CARE Score 3   Picking Up Object   Reason if not Attempted Safety concerns   Picking Up Object CARE Score 88   Car Transfer   Reason if not Attempted Environmental limitations   Car Transfer CARE Score 10   Ambulation   Primary Mode of Locomotion Prior to Admission Walk   Distance Walked (feet) 5 ft   Assist Device   (wall rail)   Gait Pattern Inconsistant Chioma;Slow Chioma;Decreased foot clearance;L foot drag;L hemiparesis;Narrow ABHINAV;Step to;Decreased L stance;Improper weight shift   Limitations Noted In Balance;Endurance;Posture;Safety;Sequencing;Strength;Swing   Provided Assistance with: Limb Stabilization;Trunk Support;Weight Shift   Walk Assist Level Moderate Assist;Chair Follow   Findings mod A using wall rail with wheelchair follow; no L knee buckle noted   Walk 10 Feet   Reason if not Attempted Safety concerns   Walk 10 Feet CARE Score 88   Walk 50 Feet with Two Turns   Reason if not Attempted Safety concerns   Walk 50 Feet with Two Turns CARE Score 88   Walk 150 Feet    Reason if not Attempted Safety concerns   Walk 150 Feet CARE Score 88   Walking 10 Feet on Uneven Surfaces   Reason if not Attempted Safety concerns   Walking 10 Feet on Uneven Surfaces CARE Score 88   Wheelchair mobility   Type of Wheelchair Used 1. Manual   Method Right upper extremity;Right lower extremity   Assistance Provided For Locking Brakes;Obstacles;Remove Leg Rest;Replace Leg Rest;Remove armrests;Replace armrests   Distance Level Surface (feet) 173 ft  (173' 134')   Distance Wheeled 3% Grade 12 ft   Findings min-mod A level and unlevel surfaces   Wheel 50 Feet with Two Turns   Type of Assistance Needed Physical assistance;Verbal cues   Physical Assistance Level 26%-50%   Comment min-mod A level and unlevel surfaces   Wheel 50 Feet with Two Turns CARE Score 3   Wheel 150 Feet   Type of Assistance Needed Physical assistance;Verbal cues   Physical Assistance Level 26%-50%   Comment min-mod A level and unlevel surfaces   Wheel 150 Feet CARE Score 3   Curb or Single Stair   Reason if not Attempted Safety concerns   1 Step (Curb) CARE Score 88   4 Steps   Reason if not Attempted Safety concerns   4 Steps CARE Score 88   12 Steps   Reason if not Attempted Safety concerns   12 Steps CARE Score 88   Stairs   Findings TBA as able   Comprehension   Assist Devices Glasses   QI: Comprehension 3. Usually Understands: Understands most conversations, but misses some part/intent of message. Requires cues at times to understand.   Comprehension (FIM) 5 - Understands basic directions and conversation   Expression   QI: Expression 4. Express complex messages without difficulty and with speech that is clear and easy to understan   Expression (FIM) 6 - Expresses complex/abstract but requires:  more time   Social Interaction   Social Interaction (FIM) 5 - Interacts appropriately with others 90% of time   Problem Solving   Problem solving (FIM) 3 - Solves basic problmes 50-74% of time   Memory   Memory (FIM) 4 -  "Recognizes/recalls/performs 75-89%   RLE Assessment   RLE Assessment   (ROM WFL)   Strength RLE   R Hip Flexion 4+/5   R Hip Extension 4+/5   R Hip ABduction 4+/5   R Hip ADduction 4+/5   R Knee Flexion 4+/5   R Knee Extension 4+/5   R Ankle Dorsiflexion 4+/5   R Ankle Plantar Flexion 4+/5   LLE Assessment   LLE Assessment   (decreased hip and knee ROM; no active ankle movement, however PROM WFL)   Strength LLE   L Hip Flexion 3-/5   L Hip Extension 3-/5   L Hip ABduction 2+/5   L Hip ADduction 3-/5   L Knee Flexion 3-/5   L Knee Extension 3-/5   L Ankle Dorsiflexion 0/5   L Ankle Plantar Flexion 0/5   Coordination   Movements are Fluid and Coordinated 0   Coordination and Movement Description decreased LLE coordination   Sensation   Light Touch No apparent deficits   Propioception No apparent deficits   Additional Comments does report \"numbness L foot but states it's from previous stroke   Cognition   Overall Cognitive Status Impaired   Arousal/Participation Alert;Responsive;Cooperative   Attention Attends with cues to redirect   Orientation Level Oriented X4   Memory Decreased short term memory;Decreased recall of recent events;Decreased recall of precautions   Following Commands Follows one step commands with increased time or repetition   Vision   Vision Comments wears glasses   Therapeutic Exercise   Therapeutic Exercise/Activity seated ther ex; AROM RLE, AAROM LLE   Discharge Information   Vocational Plan Retired/not working   Patient's Discharge Plan return home with wife; has supportive neighbors   Patient's Rehab Expectations \"ride my bike\"  (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  Presents, following hospitalization for c/o uncontrolled twitching L arm and new onset epilepsy/New onset seizure disorder in the setting new right frontal lobe vasogenic edema and radiation necrosis related to metastatic adenocarcinoma right frontal brain mass and PMH significant for stage 4 lung " CA with brain mets s/p R frontal craniotomy, with decreased ROM/strength, decreased balance and safety, decreased endurance, impaired cognition all affecting functional mobility stated above in respective sections.  Patient will benefit from continued inpatient ARC PT to increase function, safety, and increased independence in prep for safe d/c to home with continued PT services as needed.   PT Therapy Minutes   PT Time In 1029   PT Time Out 1200   PT Total Time (minutes) 91   PT Mode of treatment - Individual (minutes) 91   PT Mode of treatment - Concurrent (minutes) 0   PT Mode of treatment - Group (minutes) 0   PT Mode of treatment - Co-treat (minutes) 0   PT Mode of Treatment - Total time(minutes) 91 minutes   PT Cumulative Minutes 91   Cumulative Minutes   Cumulative therapy minutes 91

## 2024-05-02 NOTE — NURSING NOTE
Clobazam 10mg medication not available or in [employer] machine. Pulled from [facility], no option to waste. Cut in half, wasted in RX destroyer bottle 5mg of Clobazam with Annabelle MCKINNEY as witness. Pharmacy aware.

## 2024-05-02 NOTE — UTILIZATION REVIEW
NOTIFICATION OF ADMISSION DISCHARGE   This is a Notification of Discharge from [facility]. Please be advised that this patient has been discharge from our facility. Below you will find the admission and discharge date and time including the patient’s disposition.   UTILIZATION REVIEW CONTACT:  Gerardo Vargas  Utilization   Network Utilization Review Department  Phone: [phone] x carefully listen to the prompts. All voicemails are confidential.  Email: [email or web address]     ADMISSION INFORMATION  PRESENTATION DATE: 4/22/2024  6:46 PM  OBERVATION ADMISSION DATE:   INPATIENT ADMISSION DATE: 4/22/24  6:46 PM   DISCHARGE DATE: 5/1/2024  5:29 PM   DISPOSITION:[facility]    Network Utilization Review Department  ATTENTION: Please call with any questions or concerns to [phone] and carefully listen to the prompts so that you are directed to the right person. All voicemails are confidential.   For Discharge needs, contact Care Management DC Support Team at [phone] opt. [phone]  Send all requests for admission clinical reviews, approved or denied determinations and any other requests to dedicated fax number below belonging to the campus where the patient is receiving treatment. List of dedicated fax numbers for the Facilities:  FACILITY NAME UR FAX NUMBER   ADMISSION DENIALS (Administrative/Medical Necessity) [phone]   DISCHARGE SUPPORT TEAM ([facility]) [phone]   PARENT CHILD HEALTH (Maternity/NICU/Pediatrics) [phone]   [facility] [phone]   [facility] [phone]   [facility] [phone]   [facility] [phone]   [facility] [phone]   [facility] [phone]   [facility] [phone]   [facility] [phone]   [facility]  [facility] [phone]   [facility] [phone]   [facility] [phone]   [facility] [phone]

## 2024-05-02 NOTE — TREATMENT PLAN
Individualized Plan of Care - [facility]  Paras Pitts 68 y.o. male MRN: [id number]  Unit/Bed#: -02 Encounter: [id number]     PATIENT INFORMATION  ADMISSION DATE: 5/1/2024  5:56 PM JAI CATEGORY: Acquired brain injury-right frontal brain mass   ADMISSION DIAGNOSIS: Brain mass [G93.89]  EXPECTED LOS: 2 weeks     MEDICAL/FUNCTIONAL PROGNOSIS  Based on my assessment of the patient's medical conditions and current functional status, the prognosis for attaining medical and functional goals or the IRF stay is:  Fair    Medical Goals: Patient will be medically stable for discharge to [facility] restrictive envrionment upon completion of rehab program and Patient will be able to manage medical conditions and comorbid conditions with medications and follow up upon completion of rehab program    ANTICIPATED DISCHARGE DISPOSITION AND SERVICES  COMMUNITY SETTING: Home - Assistance    ANTICIPATED FOLLOW-UP SERVICE:   Home Health Services: PT, OT, SLP, and Nursing    DISCIPLINE SPECIFIC PLANS:  Required Disciplines & Services: Rehabillitation Nursing and Case Management    REQUIRED THERAPY:  Therapy Hours per Day Days per Week Total Days   Physical Therapy 1 5-6 7   Occupational Therapy 1 5-6 7   Speech/Language Therapy 1 5-6 7   NOTE: Additional therapy time(s) may be added as appropriate to meet patient needs and to achieve functional goals.      ANTICIPATED FUNCTIONAL OUTCOMES:  ADL:  Supervision - Mod I with LRAD   Bladder/Bowel: Patient will be independent with bladder/bowel management with least restrictive device upon completion of rehab program   Transfers:  Supervision - Mod I with LRAD   Locomotion:  Supervision - Mod I with LRAD   Cognitive:  Independent     DISCHARGE PLANNING NEEDS  Equipment needs: Discharge needs to be reviewed with team      REHAB ANTICIPATED PARTICIPATION RESTRICTIONS:  None

## 2024-05-02 NOTE — PLAN OF CARE
Problem: Prexisting or High Potential for Compromised Skin Integrity  Goal: Skin integrity is maintained or improved  Description: INTERVENTIONS:  - Identify patients at risk for skin breakdown  - Assess and monitor skin integrity  - Assess and monitor nutrition and hydration status  - Monitor labs   - Assess for incontinence   - Turn and reposition patient  - Assist with mobility/ambulation  - Relieve pressure over bony prominences  - Avoid friction and shearing  - Provide appropriate hygiene as needed including keeping skin clean and dry  - Evaluate need for skin moisturizer/barrier cream  - Collaborate with interdisciplinary team   - Patient/family teaching  - Consider wound care consult   Outcome: Progressing     Problem: PAIN - ADULT  Goal: Verbalizes/displays adequate comfort level or baseline comfort level  Description: Interventions:  - Encourage patient to monitor pain and request assistance  - Assess pain using appropriate pain scale  - Administer analgesics based on type and severity of pain and evaluate response  - Implement non-pharmacological measures as appropriate and evaluate response  - Consider cultural and social influences on pain and pain management  - Notify physician/advanced practitioner if interventions unsuccessful or patient reports new pain  Outcome: Progressing       Problem: NEUROSENSORY - ADULT  Goal: Achieves stable or improved neurological status  Description: INTERVENTIONS  - Monitor and report changes in neurological status  - Monitor vital signs such as temperature, blood pressure, glucose, and any other labs ordered   - Initiate measures to prevent increased intracranial pressure  - Monitor for seizure activity and implement precautions if appropriate      Outcome: Progressing  Goal: Remains free of injury related to seizures activity  Description: INTERVENTIONS  - Maintain airway, patient safety  and administer oxygen as ordered  - Monitor patient for seizure activity,  document and report duration and description of seizure to physician/advanced practitioner  - If seizure occurs,  ensure patient safety during seizure  - Reorient patient post seizure  - Seizure pads on all 4 side rails  - Instruct patient/family to notify RN of any seizure activity including if an aura is experienced  - Instruct patient/family to call for assistance with activity based on nursing assessment  - Administer anti-seizure medications if ordered    Outcome: Progressing  Goal: Achieves maximal functionality and self care  Description: INTERVENTIONS  - Monitor swallowing and airway patency with patient fatigue and changes in neurological status  - Encourage and assist patient to increase activity and self care.   - Encourage visually impaired, hearing impaired and aphasic patients to use assistive/communication devices  Outcome: Progressing      Problem: SAFETY ADULT  Goal: Patient will remain free of falls  Description: INTERVENTIONS:  - Educate patient/family on patient safety including physical limitations  - Instruct patient to call for assistance with activity   - Consult OT/PT to assist with strengthening/mobility   - Keep Call bell within reach  - Keep bed low and locked with side rails adjusted as appropriate  - Keep care items and personal belongings within reach  - Initiate and maintain comfort rounds  - Make Fall Risk Sign visible to staff  - Offer Toileting every 2 Hours, in advance of need  - Initiate/Maintain bed/chair alarm  - Apply yellow socks and bracelet for high fall risk patients  - Consider moving patient to room near nurses station  Outcome: Progressing

## 2024-05-02 NOTE — ASSESSMENT & PLAN NOTE
Patient with known metastatsis to right frontal lobe s/p right frontal craniotomy in March 2023  H-O recommended increasing dex to 8mg BID x2 days and then will resume chronic Dexamethasone 4mg qday per Elaine Kay H-0  Has been on Dexamethasone taking 4mg qday per prior providers recently for vasogenic edema (appears to be on 2mg BID prior at home based on chart review)   Patient status post chemotherapy radiation SRT, immunotherapy previously  Status post Avastin infusion on 5/15, postinfusion had fatigue which has resolved  Follows with Dr. Langford

## 2024-05-03 LAB
EST. AVERAGE GLUCOSE BLD GHB EST-MCNC: 146 MG/DL
HBA1C MFR BLD: 6.7 %

## 2024-05-03 PROCEDURE — 92526 ORAL FUNCTION THERAPY: CPT | Performed by: NURSE PRACTITIONER

## 2024-05-03 PROCEDURE — 97116 GAIT TRAINING THERAPY: CPT

## 2024-05-03 PROCEDURE — 92526 ORAL FUNCTION THERAPY: CPT

## 2024-05-03 PROCEDURE — 97112 NEUROMUSCULAR REEDUCATION: CPT

## 2024-05-03 PROCEDURE — 99232 SBSQ HOSP IP/OBS MODERATE 35: CPT | Performed by: PHYSICAL MEDICINE & REHABILITATION

## 2024-05-03 PROCEDURE — 97110 THERAPEUTIC EXERCISES: CPT

## 2024-05-03 PROCEDURE — 92507 TX SP LANG VOICE COMM INDIV: CPT

## 2024-05-03 PROCEDURE — 97530 THERAPEUTIC ACTIVITIES: CPT

## 2024-05-03 PROCEDURE — 97116 GAIT TRAINING THERAPY: CPT | Performed by: PHYSICAL THERAPIST

## 2024-05-03 PROCEDURE — 97535 SELF CARE MNGMENT TRAINING: CPT

## 2024-05-03 RX ADMIN — AMLODIPINE BESYLATE 10 MG: 10 TABLET ORAL at 10:12

## 2024-05-03 RX ADMIN — PANTOPRAZOLE SODIUM 40 MG: 40 TABLET, DELAYED RELEASE ORAL at 05:21

## 2024-05-03 RX ADMIN — LACOSAMIDE 200 MG: 150 TABLET ORAL at 21:47

## 2024-05-03 RX ADMIN — LACOSAMIDE 200 MG: 150 TABLET ORAL at 10:12

## 2024-05-03 RX ADMIN — DEXAMETHASONE 4 MG: 4 TABLET ORAL at 10:12

## 2024-05-03 RX ADMIN — PRAMIPEXOLE DIHYDROCHLORIDE 0.25 MG: 0.12 TABLET ORAL at 21:47

## 2024-05-03 RX ADMIN — LEVETIRACETAM 2000 MG: 500 TABLET, FILM COATED ORAL at 10:12

## 2024-05-03 RX ADMIN — ATORVASTATIN CALCIUM 40 MG: 40 TABLET, FILM COATED ORAL at 17:53

## 2024-05-03 RX ADMIN — CLOBAZAM 5 MG: 10 TABLET ORAL at 10:12

## 2024-05-03 RX ADMIN — PRAMIPEXOLE DIHYDROCHLORIDE 0.25 MG: 0.12 TABLET ORAL at 16:36

## 2024-05-03 RX ADMIN — LOSARTAN POTASSIUM 50 MG: 50 TABLET, FILM COATED ORAL at 10:12

## 2024-05-03 RX ADMIN — PRAMIPEXOLE DIHYDROCHLORIDE 0.25 MG: 0.12 TABLET ORAL at 10:12

## 2024-05-03 RX ADMIN — CLOBAZAM 5 MG: 10 TABLET ORAL at 17:53

## 2024-05-03 RX ADMIN — LEVETIRACETAM 2000 MG: 500 TABLET, FILM COATED ORAL at 21:00

## 2024-05-03 RX ADMIN — ENOXAPARIN SODIUM 40 MG: 40 INJECTION SUBCUTANEOUS at 10:12

## 2024-05-03 NOTE — PROGRESS NOTES
05/03/24 1445   Pain Assessment   Pain Assessment Tool 0-10   Pain Score No Pain   Restrictions/Precautions   Precautions Bed/chair alarms;Cognitive;Fall Risk;Pain;Supervision on toilet/commode  (L UE/LE hemiplegia)   Weight Bearing Restrictions No   ROM Restrictions No   Cognition   Overall Cognitive Status WFL   Subjective   Subjective I'm doing well, but I get so tired.   Roll Left and Right   Comment Pt OOB   Sit to Lying   Comment Pt OOB   Lying to Sitting on Side of Bed   Comment Pt OOB   Sit to Stand   Type of Assistance Needed Physical assistance   Physical Assistance Level 25% or less   Comment min A, cues for push to stand   Sit to Stand CARE Score 3   Bed-Chair Transfer   Type of Assistance Needed Physical assistance   Physical Assistance Level 51%-75%   Comment mod A, cues for technique, left platform RW   Chair/Bed-to-Chair Transfer CARE Score 2   Car Transfer   Reason if not Attempted Environmental limitations   Car Transfer CARE Score 10   Walk 10 Feet   Type of Assistance Needed Physical assistance   Physical Assistance Level 26%-50%   Comment mod A, left platform RW   Walk 10 Feet CARE Score 3   Walk 50 Feet with Two Turns   Reason if not Attempted Safety concerns   Walk 50 Feet with Two Turns CARE Score 88   Walk 150 Feet   Reason if not Attempted Safety concerns   Walk 150 Feet CARE Score 88   Walking 10 Feet on Uneven Surfaces   Reason if not Attempted Safety concerns   Walking 10 Feet on Uneven Surfaces CARE Score 88   Ambulation   Primary Mode of Locomotion Prior to Admission Walk   Distance Walked (feet) 40 ft  (10 ft x 1, 5 ft x 1, 40 ft x 2)   Assist Device   (Left platform RW)   Does the patient walk? 2. Yes   Wheel 50 Feet with Two Turns   Type of Assistance Needed Physical assistance   Physical Assistance Level 26%-50%   Comment min-mod A, right UE/LE propulsion   Wheel 50 Feet with Two Turns CARE Score 3   Wheel 150 Feet   Type of Assistance Needed Physical assistance   Physical  Assistance Level 26%-50%   Comment min-mod A, right UE/LE propulsion   Wheel 150 Feet CARE Score 3   Therapeutic Interventions   Other Focus this session on gait sequence, weight shift, left quad control, left LE advancement   Assessment   Treatment Assessment Pt presents in wheelchair, motivated to improve. Overall demonstrates good spirit to improve, however, emotionally labile throughout session.  Encouragement provided.  Unable toadvance left LE at this time, however, improved left sided awareness with UB/LB weight bearing.  cont per POC and progress as tolerated.   Problem List Decreased strength;Decreased endurance;Impaired balance;Decreased mobility;Decreased safety awareness;Impaired judgement;Decreased coordination   Barriers to Discharge Decreased caregiver support;Inaccessible home environment   PT Barriers   Physical Impairment Decreased range of motion;Decreased endurance;Impaired balance;Decreased mobility;Decreased safety awareness;Decreased coordination   Functional Limitation Stair negotiation;Standing;Transfers;Walking;Wheelchair management   Plan   Treatment/Interventions Functional transfer training;LE strengthening/ROM;Elevations;Therapeutic exercise;Endurance training;Cognitive reorientation;Patient/family training;Equipment eval/education;Bed mobility;Gait training   Progress Progressing toward goals   PT Therapy Minutes   PT Time In 1445   PT Time Out 1515   PT Total Time (minutes) 30   PT Mode of treatment - Individual (minutes) 30   PT Mode of treatment - Concurrent (minutes) 0   PT Mode of treatment - Group (minutes) 0   PT Mode of treatment - Co-treat (minutes) 0   PT Mode of Treatment - Total time(minutes) 30 minutes   PT Cumulative Minutes 181       Patient remains OOB in chair, all needs in reach.  Alarm in place and activated.  Encouraged use of call bell, patient verbalizes understanding.

## 2024-05-03 NOTE — PROGRESS NOTES
PM&R PROGRESS NOTE:  Paras Pitts 68 y.o. 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  CT head showing right frontal lobe vasogenic edema with known underlying lesion.  MRI brain showing a right superior frontal mass resection and chemoradiation with unchanged linear enhancement along the surgical cavity compared to 3/26/2024 favoring radiation necrosis.  Patient seen by neurology.  Video EEG showing: Early in recording there was frequent or nearly continuous focal motor seizure and later there were two subclinical right fronto central electrographic seizures.  Patient placed on the following AED regimen: Keppra 2 g twice daily, Vimpat 200 mg twice daily, Onfi 5 mg twice daily.  Of note patient was significantly sedated with Ativan.   Patient to follow-up with epileptologist as an outpatient.  Patient was seen by radiation oncology regarding edema and  radiation necrosis and recommended to increase dexamethasone to 4 mg twice daily.  Patient is under the care of Dr. Langford for medical oncology and is to begin Avastin, chemotherapy radiation therapy for his recurrent mediastinal disease post rehabilitation.  After medical stabilization, patient found to have acute functional deficits from his mobility and self-care, therefore admitted to [facility] for acute inpatient rehabilitation.    SUBJECTIVE: Patient seen face to face.  Feeling well overall.  Slept well last night.  Very motivated to get better.  Has seen functional improvement with increased Decadron dose.  Denies headaches or pain.      ASSESSMENT: Stable, progressing      PLAN:    Rehabilitation  Functional deficits:  Left hemiparesis, left inattention, impaired balance, impaired mobility, self care   Continue current rehabilitation plan of care to maximize function.    Functional update:   Evals in progress  Estimated Discharge: TBD    DVT prophylaxis  Continue subcutaneous Lovenox 40 mg daily     Bladder plan  Continent and voiding  Will check PVR     Bowel plan  Continent  Multiple bowel movements today therefore bowel regimen was changed to as needed  BM 5/2/2024      * Seizure disorder (HCC)  Assessment & Plan  New onset seizures presenting for 2024  CT head showing right frontal lobe vasogenic edema with known underlying lesion.    MRI brain showing a right superior frontal mass resection and chemoradiation with unchanged linear enhancement along the surgical cavity compared to 3/26/2024 favoring radiation necrosis.    Patient seen by neurology.    Video EEG showing: Early in recording there was frequent or nearly continuous focal motor seizure and later there were two subclinical right fronto central electrographic seizures.    Patient placed on the following AED regimen: Keppra 2 g twice daily, Vimpat 200 mg twice daily, Onfi 5 mg twice daily.  *Of note patient was significantly sedated with  Ativan.  Continue seizure precautions     Patient to follow-up with neurology-epileptologist as an outpatient.    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  Dexamethasone was increased to 4 mg twice daily.    Patient is under the care of Dr. Langford for medical oncology and is to begin Avastin, chemotherapy radiation therapy for his recurrent mediastinal disease post rehabilitation.    Left hemiparesis (HCC)  Assessment & Plan  Worsening left hemiparesis likely related to recent radiation necrosis and vasogenic edema, seizures  Continue Decadron 4 mg twice daily  Continue acute rehabilitation program to maximize function      Brain mass  Assessment & Plan  Patient with known metastatsis to right frontal lobe s/p right frontal craniotomy in March 2023  Residual mild left hemiparesis but remained highly functional and independent.  Patient status post chemotherapy radiation SRT, immunotherapy  Follows with Dr. Langford    Adjustment disorder  Assessment & Plan  May benefit from a neuropsychology consultation if patient is agreeable    Pseudobulbar affect  Assessment & Plan  At times can be tearful and emotionally labile  May benefit from a future SSRI  Will monitor      History of CVA (cerebrovascular accident)  Assessment & Plan  Patient with ischemic CVA in 2011  Resultant mild left hemiparesis    Hypercholesterolemia  Assessment & Plan  Continue Lipitor 40 mg daily (home dose)    Essential hypertension  Assessment & Plan  Continue Norvasc 10 mg daily and losartan 50 mg daily (home dose)  Monitor BP and heart rate during  "rest and with activity  Internal medicine managing    Overweight (BMI 25.0-29.9)  Assessment & Plan  Dietary and lifestyle changes when able        Appreciate IM consultants medical co-management.  Labs, medications, and imaging personally reviewed.      ROS:  A ten point review of systems was completed on 05/03/24 and pertinent positives are listed in subjective section. All other systems reviewed were negative.     OBJECTIVE:   /76 (BP Location: Right arm)   Pulse 64   Temp 97.9 °F (36.6 °C) (Temporal)   Resp 17   Ht 5' 9\" (1.753 m)   Wt 91.6 kg (201 lb 15.1 oz)   SpO2 98%   BMI 29.82 kg/m²     Physical Exam  Vitals and nursing note reviewed.   Constitutional:       General: He is not in acute distress.  HENT:      Head: Normocephalic and atraumatic.      Nose: Nose normal.      Mouth/Throat:      Mouth: Mucous membranes are moist.   Eyes:      Conjunctiva/sclera: Conjunctivae normal.   Cardiovascular:      Rate and Rhythm: Normal rate and regular rhythm.      Pulses: Normal pulses.   Pulmonary:      Effort: Pulmonary effort is normal.      Breath sounds: Normal breath sounds. No wheezing or rales.   Abdominal:      General: Bowel sounds are normal. There is no distension.      Palpations: Abdomen is soft.      Tenderness: There is no abdominal tenderness.   Musculoskeletal:         General: No swelling.      Cervical back: Neck supple.   Skin:     General: Skin is warm.   Neurological:      Mental Status: He is alert and oriented to person, place, and time.      Motor: Weakness (left hemiparesis) present.   Psychiatric:         Mood and Affect: Mood normal.          Lab Results   Component Value Date    WBC 7.71 04/23/2024    HGB 13.6 04/23/2024    HCT 41.2 04/23/2024    MCV 93 04/23/2024     05/02/2024     Lab Results   Component Value Date    SODIUM 141 04/28/2024    K 3.7 04/28/2024     04/28/2024    CO2 29 04/28/2024    BUN 17 04/28/2024    CREATININE 0.80 04/28/2024    GLUC 142 (H) " 04/28/2024    CALCIUM 8.9 04/28/2024     Lab Results   Component Value Date    INR 0.92 04/20/2024    INR 0.98 08/30/2023    INR 1.06 03/24/2023    PROTIME 12.6 04/20/2024    PROTIME 13.2 08/30/2023    PROTIME 14.0 03/24/2023           Current Facility-Administered Medications:     acetaminophen (TYLENOL) tablet 975 mg, 975 mg, Oral, Q8H PRN, Jenise Dawson PA-C    aluminum-magnesium hydroxide-simethicone (MAALOX) oral suspension 30 mL, 30 mL, Oral, Q6H PRN, Jenise Dawson PA-C    amLODIPine (NORVASC) tablet 10 mg, 10 mg, Oral, Daily, Jenise Dawson PA-C, 10 mg at 05/03/24 1012    atorvastatin (LIPITOR) tablet 40 mg, 40 mg, Oral, After Dinner, Jenise Dawson PA-C, 40 mg at 05/02/24 1811    bisacodyl (DULCOLAX) rectal suppository 10 mg, 10 mg, Rectal, Daily PRN, Jenise Dawson PA-C    cloBAZam (ONFI) tablet 5 mg, 5 mg, Oral, BID, Jenise Dawson PA-C, 5 mg at 05/03/24 1012    dexamethasone (DECADRON) tablet 4 mg, 4 mg, Oral, Daily, Jenise Dawson PA-C, 4 mg at 05/03/24 1012    docusate sodium (COLACE) capsule 100 mg, 100 mg, Oral, BID PRN, Corina Arita MD    enoxaparin (LOVENOX) subcutaneous injection 40 mg, 40 mg, Subcutaneous, Daily, Jenise Dawson PA-C, 40 mg at 05/03/24 1012    lacosamide (VIMPAT) tablet 200 mg, 200 mg, Oral, Q12H LEN, Jenise Dawson PA-C, 200 mg at 05/03/24 1012    levETIRAcetam (KEPPRA) tablet 2,000 mg, 2,000 mg, Oral, Q12H LEN, Jenise Dawson PA-C, 2,000 mg at 05/03/24 1012    losartan (COZAAR) tablet 50 mg, 50 mg, Oral, Daily, Jenise Dawson PA-C, 50 mg at 05/03/24 1012    ondansetron (ZOFRAN-ODT) dispersible tablet 4 mg, 4 mg, Oral, Q6H PRN, Jenise Dawson PA-C    pantoprazole (PROTONIX) EC tablet 40 mg, 40 mg, Oral, Early Morning, Jenise Dawson PA-C, 40 mg at 05/03/24 0521    polyethylene glycol (MIRALAX) packet 17 g, 17 g, Oral, Daily PRN, Corina Arita MD     pramipexole (MIRAPEX) tablet 0.25 mg, 0.25 mg, Oral, TID, Jenise Dawson PA-C, 0.25 mg at 05/03/24 1012    Past Medical History:   Diagnosis Date    Brain compression (HCC) 03/20/2023    Brain mass 03/20/2023    Cancer (HCC) 2001    Receint lung and brain lesions and prostate cancer in 2001    Cerebral edema (HCC) 03/20/2023    Hypertension     Prostate cancer (HCC) 2001    Rectal bleeding     Stroke (HCC)     2011         Patient Active Problem List    Diagnosis Date Noted    Seizure disorder (HCC) 05/02/2024    Metastatic adenocarcinoma (HCC) 2023    Left hemiparesis (HCC) 05/02/2024    Brain mass 03/20/2023    Frontal mass of brain 05/02/2024    Pseudobulbar affect 05/02/2024    Adjustment disorder 05/02/2024    Constipation by delayed colonic transit 04/25/2024    Seizure-like activity (HCC) 04/23/2024    Generalized weakness 04/20/2024    Tremors of nervous system 04/20/2024    History of CVA (cerebrovascular accident) 06/22/2023    Chemotherapy-induced neutropenia (HCC) 05/03/2023    Encounter for central line care 05/01/2023    Carcinoma of right lung (HCC) 04/13/2023    Lung nodule 03/21/2023    Hypercholesterolemia 09/20/2021    Essential hypertension 09/11/2020    Annual physical exam 09/11/2020    Negative depression screening 02/24/2020    Overweight (BMI 25.0-29.9) 02/24/2020          Corina Arita MD    I have spent a total time of 35 minutes on 05/03/24 in caring for this patient including Impressions, Counseling / Coordination of care, and Documenting in the medical record.      ** Please Note:  voice to text software may have been used in the creation of this document. Although proof errors in transcription or interpretation are a potential of such software**

## 2024-05-03 NOTE — PROGRESS NOTES
"Progress Note - Paras Pitts 68 y.o. male MRN: [id number]    Unit/Bed#: [facility] 213-02 Encounter: [id number]        Subjective:   Patient seen and examined at bedside after reviewing the chart and discussing the case with the caring staff.      Patient examined at bedside.  Patient has no acute symptoms.    Physical Exam   Vitals: Blood pressure 129/76, pulse 64, temperature 97.9 °F (36.6 °C), temperature source Temporal, resp. rate 17, height 5' 9\" (1.753 m), weight 91.6 kg (201 lb 15.1 oz), SpO2 98%.,Body mass index is 29.82 kg/m².  Constitutional: Patient in no acute distress.  HEENT: PERR, EOMI, MMM.  Cardiovascular: Normal rate and regular rhythm.    Pulmonary/Chest: Effort normal and breath sounds normal.   Abdomen: Soft, + BS, NT.    Assessment/Plan:  Paras Pitts is a(n) 68 y.o. male with tremors of      Cardiac Hx w/ HTN, HLD, hx of CVA. Continue amlodipine 10 mg daily, Losartan 50 mg daily, atorvastatin 40 mg daily.  Type 2 diabetes mellitus.  Hgb A1c 6.7% on 5/3/24.  Carb controlled diet.  GERD. Continue Protonix 40 mg daily.  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 Stage Nicholas. S/p robotic converted to right thoracotomy and right upper lobectomy on 9/1/2023. S/p bronchoscopy with EBUS at [facility] on 4/16/2024. Continue Decadron 4 mg daily. Patient [facility] 4/29. Further radiation treatment in 1-2 weeks per oncologist, Dr. Contreras.  Pain and bowel regimen. As per physiatry.  Tremors of the nervous system. Continue Keppra twice daily Vimpat 100 mg twice daily, clobazam 0.5 mg twice daily. Follow up in 4 weeks with epilepsy attending. Patient receiving intensive PT OT as per physiatry.     The patient was discussed with Dr. Andrade and he is in agreement with the above note.  "

## 2024-05-03 NOTE — PROGRESS NOTES
05/03/24 0900   Pain Assessment   Pain Assessment Tool 0-10   Pain Score No Pain   Restrictions/Precautions   Precautions Aspiration;Bed/chair alarms;Cognitive;Fall Risk;Seizure;Supervision on toilet/commode   Weight Bearing Restrictions No   ROM Restrictions No   Oral Hygiene   Type of Assistance Needed Set-up / clean-up   Physical Assistance Level No physical assistance   Comment seated at sink, squeezed toothpaste with L hand   Oral Hygiene CARE Score 5   Grooming   Able To Initiate Tasks;Acquire Items;Brush/Clean Teeth   Limitation Noted In Coordination;Safety;Strength   Bathing   Findings  washed buttocks prior to pulling pants up with CG from OT   Lower Body Dressing   Type of Assistance Needed Physical assistance   Physical Assistance Level 51%-75%   Comment assist to don pant legs over bilat LE, pull up L side   Lower Body Dressing CARE Score 2   Sit to Stand   Type of Assistance Needed Physical assistance   Physical Assistance Level 51%-75%   Comment VC's for hand placement when standing   Sit to Stand CARE Score 2   Toileting Hygiene   Type of Assistance Needed Physical assistance   Physical Assistance Level 51%-75%   Comment assist to manage L side of pants, obtain/empty urinal; stood with CG/Alek from OT when using urinal   Toileting Hygiene CARE Score 2   Toileting   Able to Pull Clothing down yes, up yes.  (with assist from OT on L side)   Manage Hygiene Bladder   Limitations Noted In Balance;Coordination;Problem Solving;ROM;Safety;UE Strength;LE Strength   Exercise Tools   Hand Gripper x30 digit flexion/extension in bilat hands using 5# digi flex   Other Exercise Tool 1 towel slides x15 all planes of motion using bilat UE, AAROM provided to LUE from RUE for approx half of the repetitions with LUE completing AROM I'ly for remaining repetitions with increased time   Neuromuscular Education   Functional Movement Patterns Saebo MAS used for LUE A/AAROM and fxl activities: tension set to 3.5, completed  x10 reps of shoulder flexion/extension, horizontal ABD/ADD, internal/external rotation with pt obtaining approx 75% shld flexion and rotatoin/ABD WNL; after A/AAROM, completed clothespin pinching activity, assist to adequately pinch clothespins in L hand, increased time to clip   Cognition   Overall Cognitive Status Impaired   Arousal/Participation Alert;Responsive;Cooperative   Attention Attends with cues to redirect   Orientation Level Oriented X4   Memory Decreased short term memory;Decreased recall of recent events;Decreased recall of precautions   Following Commands Follows one step commands with increased time or repetition   Assessment   Treatment Assessment Pt agreeable to OT session this AM. Received sitting upright in w/c finishing ADL. Pt modA LB dressing, supervision grooming/oral care; fxl transfers overall modA with increased time and VC's for hand placement. Pt then completed ROM/strength, coordination, and NMR exercises; details in respective sections. Pt continues with decreased ROM/strength and coordination throughout LUE, however showed positive progress in shoulder AROM when using Saebo. Pt occasionally distracted by conversation and required redirection to remain on task. Pt an active participant in therapy and is eager to improve; states completing A/AAROM in LUE and painting in room outside of therapy time. Pt's barriers to d/c include decreased strength throughout but especially L side s/p previous CVA, decreased balance, impaired coordination L side, impaired safety awareness, problem solving, and attention, and decreased activity tolerance; all affect independence in self care and fxl transfers. Pt would benefit from continued skilled OT services in order to address listed barriers and prepare for safe d/c.   Prognosis Good   Problem List Decreased strength;Decreased range of motion;Decreased endurance;Impaired balance;Decreased coordination;Impaired judgement;Decreased cognition;Decreased  safety awareness   Plan   Treatment/Interventions ADL retraining;Functional transfer training;LE strengthening/ROM;Therapeutic exercise;Endurance training;Cognitive reorientation;Patient/family training;Equipment eval/education;Compensatory technique education;OT   Progress Progressing toward goals   OT Therapy Minutes   OT Time In 0900   OT Time Out 1000   OT Total Time (minutes) 60   OT Mode of treatment - Individual (minutes) 60

## 2024-05-03 NOTE — NUTRITION
24 1331   Biochemical Data,Medical Tests, and Procedures   Biochemical Data/Medical Tests/Procedures Lab values reviewed;Meds reviewed   Labs (Comment)  B. 5/2 A1c:6.7%   Meds (Comment) norvasc, lipitor, onfi, decadron, colace, lovenox, keppra, cozaar, zofran, protonix, mirapex   Speech Therapy Recommendations (Comment) Receiving speech therapy   Nutrition-Focused Physical Exam   Nutrition-Focused Physical Exam Findings RN skin assessment reviewed;No skin issues documented   Nutrition-Focused Physical Exam Findings Left hemiparesis, reports he is right hand dominant and needs assistance with tray setup. Missing teeth. LBM .   Medical-Related Concerns Brain compression (HCC) 2023    Brain mass 2023    Cancer (HCC)  Receint lung and brain lesions and prostate cancer in  Patient Reported  Cerebral edema (HCC) 2023    Hypertension     Prostate cancer (HCC)     Rectal bleeding     Stroke (HCC)   Adequacy of Intake   Nutrition Modality PO   Feeding Route   PO With assistance  (tray setup)   Current PO Intake   Current Diet Order Cardiac diet thin liquids   Current Meal Intake %   Estimated calorie intake compared to estimated need Nutrient needs met.   PES Statement   Problem Clinical   Weight (3) Unintended weight gain NC-3.4   Related to Other (Comment)  (medication)   As evidenced by: Per patient/family interview;Weight gain   Recommendations/Interventions   Malnutrition/BMI Present No  (does not meet criteria)   Summary Speech. Cardiac diet thin liquids. Meal completions 100%. Reports good appetite. Reports no diet plan. Resides with wife. Consumes 3 meals per day. Reports his wife works for a catering service and often brings food home for him. #; #; #; #; #. Reports gradual weight gain due to being prescribed steroids. Left hemiparesis, reports he is right hand dominant and needs assistance with tray setup.  Missing teeth. LBM 5/2. Skin intact. Recommend liberalize to 4gm Na diet thin liquids.   Interventions/Recommendations Adjust diet order   Intervention Comments 4gm Na diet thin liquids   Education Assessment   Education Education not indicated at this time;Patient/caregiver not appropriate for education at this time   Patient Nutrition Goals   Goal Avoid weight gain;Meet PO needs   Goal Status Initiated   Timeframe to complete goal by next f/u   Nutrition Complexity Risk   Nutrition complexity level Low risk   Follow up date 05/10/24

## 2024-05-03 NOTE — PROGRESS NOTES
05/03/24 1200   Pain Assessment   Pain Assessment Tool 0-10   Pain Score No Pain   Swallow Assessment   Swallow Treatment Assessment Assessed with meal tray in concurrent session with another patient this date to ensure diet tolerance with increased social enviornment. Pt continues to tolerate regular solids and thin liquids without difficulty observed or reported. Case d/w staff, no difficulty observed. No further swallow tx indicated at this time, tolerating regular solids and thin liquids without difficulty. Continue this diet.   SLP Therapy Minutes   SLP Time In 1200   SLP Time Out 1240   SLP Total Time (minutes) 40   SLP Mode of treatment - Individual (minutes) 0   SLP Mode of treatment - Concurrent (minutes) 40   SLP Mode of treatment - Group (minutes) 0   SLP Mode of treatment - Co-treat (minutes) 0   SLP Mode of Treatment - Total time(minutes) 40 minutes   SLP Cumulative Minutes 130

## 2024-05-03 NOTE — PROGRESS NOTES
05/03/24 1607   Pain Assessment   Pain Assessment Tool 0-10   Pain Score No Pain   Pain Location/Orientation Location: Abdomen   Pain Onset/Description Onset: Ongoing   Effect of Pain on Daily Activities Guarding   Patient's Stated Pain Goal No pain   Multiple Pain Sites No   Restrictions/Precautions   Precautions Bed/chair alarms;Cognitive;Fall Risk;Pain;Supervision on toilet/commode   Weight Bearing Restrictions No   ROM Restrictions No   Cognitive Linguisitic Assessments   Cognitive Linquistic Quick Test (CLQT) RBANS completed, see note for details   Comprehension   Assist Devices Glasses   QI: Comprehension 3. Usually Understands: Understands most conversations, but misses some part/intent of message. Requires cues at times to understand.   Comprehension (FIM) 5 - Needs help/cues, repetition only RARELY ( < 10% of the time)   Expression   QI: Expression 4. Express complex messages without difficulty and with speech that is clear and easy to understan   Expression (FIM) 5 - Needs help/cues only RARELY (< 10% of the time)   Social Interaction   Social Interaction (FIM) 5 - Interacts appropriately with others 90% of time   Problem Solving   Problem solving (FIM) 5 - Solves basic problems 90% of time   Memory   Memory (FIM) 5 - Recalls/performs request 90% of time   Memory Skills   Orientation Level Oriented X4   Language Assessments   [place] Diagnostic Aphasia Exam (BDAE) BDAE short form test 14/15   Informal Speech Language Assessment word finding errors x10 throughout session with overall slightly delayed processing time   Speech/Language/Cognition Assessmetn   Treatment Assessment Patient received sitting upright in bedside chair watching television and completing the newspaper crossword puzzle.  Patient able to verbally express explicit details about his wifes career history, her current commute via [facility] into [facility].  Patient discussing PMH including last years admission, lung CA diagnosis with brain mets s/p  tumor resection of the lung and brain.  Pt with PET results and new lung lesions, neurological reaction to medications resulting in L sided deficits.  Patient serves as a great historian.  Patient demonstrates word finding difficulty x4 throughout session.  Patient demonstrates appropriate problem solving for word finding, using strategies appropriately.  Pt demonstrates ability to problem solve for dinner meal tray.   Swallow Information   Current Risks for Dysphagia & Aspiration New Neuro event;Brain injury;Cognitive deficit   Current Symptoms/Concerns Clear throat   Current Diet Regular;Thin liquid   Baseline Diet Regular;Thin liquids   Consistencies Assessed and Performance   Materials Admnistered Regular/Solid;Thin liquid;Pills   Oral Stage WFL   Phargngeal Stage Mild impaired   Swallow Mechanics Mild delayed   Strategies and Efficacy small bites, small sips, slow rate, liquid wash   Recommendations   Risk for Aspiration Mild   Recommendations Consider oral diet;Dysphagia treatment   Diet Solid Recommendation Regular consistency   Diet Liquid Recommendation Thin liquid   Recommended Form of Meds Whole;With puree   General Precautions Aspiration precautions;Minimize distractions;Upright as possible for all oral intake;Remain upright for 45 mins after meals   Compensatory Swallowing Strategies Alternate solids and liquids   Results Reviewed with RN;PT/Family/Caregiver   Eating   Type of Assistance Needed Set-up / clean-up   Physical Assistance Level No physical assistance   Eating CARE Score 5   Swallow Assessment   Swallow Treatment Assessment Assessed with regular thin dinner tray today, patient demonstrates small bites, small sips throughout.  Encouraged alternation.  Patient able to engage in conversation throughout meal with no increase of overt s/s.  Patient reports tolerance of diet, he recognizes that he should eat more slowly   Swallow Assessment Prognosis   Prognosis Good   Prognosis Considerations  Co-morbidities   SLP Therapy Minutes   SLP Time In 1607   SLP Time Out 1727   SLP Total Time (minutes) 80   SLP Mode of treatment - Individual (minutes) 80   SLP Mode of treatment - Concurrent (minutes) 0   SLP Mode of treatment - Group (minutes) 0   SLP Mode of treatment - Co-treat (minutes) 0   SLP Mode of Treatment - Total time(minutes) 80 minutes   SLP Cumulative Minutes 210   Therapy Time missed   Time missed? No

## 2024-05-03 NOTE — PROGRESS NOTES
"   05/03/24 1105   Pain Assessment   Pain Assessment Tool 0-10   Pain Score No Pain   Restrictions/Precautions   Precautions Fall Risk;Cognitive;Bed/chair alarms;Supervision on toilet/commode  (LUE and LE hemiplegia)   Cognition   Overall Cognitive Status WFL   Arousal/Participation Alert   Attention Within functional limits   Orientation Level Oriented X4   Memory Within functional limits   Following Commands Follows multistep commands with increased time or repetition   Subjective   Subjective \"I was doing really well until this.\"   Sit to Stand   Type of Assistance Needed Physical assistance;Verbal cues;Supervision   Physical Assistance Level 26%-50%   Comment mod A with cues for safe hand placement.   Sit to Stand CARE Score 3   Transfer Bed/Chair/Wheelchair   Sit to Stand Moderate Assist   Stand to Sit Moderate Assist   Findings Manual and verbal cues to encourage use of B UEs for sit to stand. Pt completed multiple repetitions of sit to stand throughout tx.   Walk 10 Feet   Type of Assistance Needed Physical assistance;Verbal cues   Physical Assistance Level 26%-50%   Comment mod A with handrail and quad cane   Walk 10 Feet CARE Score 3   Walk 50 Feet with Two Turns   Reason if not Attempted Safety concerns   Walk 50 Feet with Two Turns CARE Score 88   Walk 150 Feet   Reason if not Attempted Safety concerns   Walk 150 Feet CARE Score 88   Walking 10 Feet on Uneven Surfaces   Reason if not Attempted Safety concerns   Walking 10 Feet on Uneven Surfaces CARE Score 88   Ambulation   Primary Mode of Locomotion Prior to Admission Walk   Distance Walked (feet) 20 ft  (12' with quad cane)   Assist Device   (handrail and quad cane)   Gait Pattern L hemiparesis;Decreased foot clearance;Inconsistant [place];Improper weight shift   Limitations Noted In Balance;Coordination;Device Management;Endurance;Heel Strike;Neglect;Posture;Safety;Speed   Provided Assistance with: Balance;Direction;Limb Advancement   Walk Assist Level " Moderate Assist   Findings Pt gait trained 20' with R hand on handrail with mod A.  w/c follow provided.  L ankle ace wrapped for DF assist which allowed improved ability for pt to advance the L LE more consistently without assistance.  Pt also trialed gait with the quad cane. Mod A required. Cues for sequencing and intermittent assist for proper placement of L foot.  Pt required verbal cues to encourage upright posture.   Does the patient walk? 2. Yes   Curb or Single Stair   Reason if not Attempted Safety concerns   1 Step (Curb) CARE Score 88   4 Steps   Reason if not Attempted Safety concerns   4 Steps CARE Score 88   12 Steps   Reason if not Attempted Safety concerns   12 Steps CARE Score 88   Therapeutic Interventions   Strengthening Standing mini squats 3 x 10 with seated rest breaks between reps.  Standing R hip hikes 2 x 10 with seated rest breaks between reps.  Manual assist required to guard L knee. Seated ex - L hip hikes x 10, L LAQs with A x 10, L quad sets x 10. L heel cord stretch 3 x 30 sec hold. Pt with 2-/5 strength noted L PF.   Other gait, transfers   Assessment   Treatment Assessment Pt motivated to participate with therapy. Completed WB exercises.  L knee guarded, but no buckling of knee.  Pt requires mod A for sit to stand with verbal and manual cues for safe hand placement with transfers.  L ankle ace wrapped to assist with DF during gait.  Pt had improved ability to advance the L LE.  Intermittent assist for accurate placement of L LE.  Pt gait trained with HR and with quad cane.  Cues for sequencing with the quad cane. Attempted gait with the RW, but pt had difficulty with weight shifting and unable to advance the L LE.  Pt will benefit from continued PT to progress gait, transfers, balance, strengthening, steps, and safety in order to maximize function and decrease risk for falls.   Problem List Decreased strength;Decreased endurance;Impaired balance;Decreased mobility;Decreased safety  awareness;Impaired judgement;Decreased coordination   PT Barriers   Physical Impairment Decreased range of motion;Decreased endurance;Impaired balance;Decreased mobility;Decreased safety awareness;Decreased coordination   Functional Limitation Stair negotiation;Standing;Transfers;Walking;Wheelchair management   Plan   Treatment/Interventions Functional transfer training;LE strengthening/ROM;Therapeutic exercise;Endurance training;Patient/family training;Equipment eval/education;Gait training   Progress Progressing toward goals   PT Therapy Minutes   PT Time In 1105   PT Time Out 1205   PT Total Time (minutes) 60   PT Mode of treatment - Individual (minutes) 60   PT Mode of treatment - Concurrent (minutes) 0   PT Mode of treatment - Group (minutes) 0   PT Mode of treatment - Co-treat (minutes) 0   PT Mode of Treatment - Total time(minutes) 60 minutes   PT Cumulative Minutes 151   Therapy Time missed   Time missed? No

## 2024-05-03 NOTE — CASE MANAGEMENT
5/2/24 5:00PM CM met with patient,explained rehab routine and CM role. Patient reported he has 2 residences in [place],he will be going home to 2 story work studio :[address] [place], has a full bath step in shower, elevated toilet seat, with 1STE. Patient reports [place] with ADL's, ambulates with cane. His permanent home is on [facility] in [place] 3[address] with 4STe, bedrooms and full bath on 2nd floor.Patient stated his wife works in [place] from Monday to Friday, she has an apt in [place]. Patient has prior experience with outpt PT at [facility], [facility] at [facility], and [facility] with [facility]. Patient has prescription coverage and uses [facility] pharmacy for his medications. Patient ha one daughter who lives in [place]. CM confirmed insurance coverage, explained team meeting and insurance process. CM will follow for discharge needs.

## 2024-05-04 PROCEDURE — 97110 THERAPEUTIC EXERCISES: CPT

## 2024-05-04 PROCEDURE — 97530 THERAPEUTIC ACTIVITIES: CPT

## 2024-05-04 PROCEDURE — 97116 GAIT TRAINING THERAPY: CPT

## 2024-05-04 PROCEDURE — 97542 WHEELCHAIR MNGMENT TRAINING: CPT

## 2024-05-04 PROCEDURE — 97535 SELF CARE MNGMENT TRAINING: CPT

## 2024-05-04 RX ADMIN — PANTOPRAZOLE SODIUM 40 MG: 40 TABLET, DELAYED RELEASE ORAL at 05:41

## 2024-05-04 RX ADMIN — PRAMIPEXOLE DIHYDROCHLORIDE 0.25 MG: 0.12 TABLET ORAL at 08:27

## 2024-05-04 RX ADMIN — CLOBAZAM 5 MG: 10 TABLET ORAL at 08:27

## 2024-05-04 RX ADMIN — PRAMIPEXOLE DIHYDROCHLORIDE 0.25 MG: 0.12 TABLET ORAL at 16:19

## 2024-05-04 RX ADMIN — CLOBAZAM 5 MG: 10 TABLET ORAL at 18:21

## 2024-05-04 RX ADMIN — ATORVASTATIN CALCIUM 40 MG: 40 TABLET, FILM COATED ORAL at 18:21

## 2024-05-04 RX ADMIN — LOSARTAN POTASSIUM 50 MG: 50 TABLET, FILM COATED ORAL at 08:26

## 2024-05-04 RX ADMIN — ENOXAPARIN SODIUM 40 MG: 40 INJECTION SUBCUTANEOUS at 08:25

## 2024-05-04 RX ADMIN — LEVETIRACETAM 2000 MG: 500 TABLET, FILM COATED ORAL at 08:26

## 2024-05-04 RX ADMIN — LACOSAMIDE 200 MG: 150 TABLET ORAL at 21:23

## 2024-05-04 RX ADMIN — PRAMIPEXOLE DIHYDROCHLORIDE 0.25 MG: 0.12 TABLET ORAL at 21:23

## 2024-05-04 RX ADMIN — LACOSAMIDE 200 MG: 150 TABLET ORAL at 08:27

## 2024-05-04 RX ADMIN — AMLODIPINE BESYLATE 10 MG: 10 TABLET ORAL at 08:27

## 2024-05-04 RX ADMIN — DEXAMETHASONE 4 MG: 4 TABLET ORAL at 08:27

## 2024-05-04 RX ADMIN — LEVETIRACETAM 2000 MG: 500 TABLET, FILM COATED ORAL at 21:23

## 2024-05-04 NOTE — PROGRESS NOTES
05/04/24 0828   Pain Assessment   Pain Assessment Tool 0-10   Pain Score No Pain   Restrictions/Precautions   Precautions Bed/chair alarms;Cognitive;Fall Risk;Supervision on toilet/commode  (L UE/LE hemiplegia)   Oral Hygiene   Type of Assistance Needed Set-up / clean-up   Comment   (Seated at sink; Pt utilized L hand to squeeze toothpaste)   Oral Hygiene CARE Score 5   Shower/Bathe Self   Type of Assistance Needed Physical assistance   Physical Assistance Level 51%-75%   Shower/Bathe Self CARE Score 2   Bathing   Assessed Bath Style Sponge Bath   Anticipated D/C Bath Style Shower;Sponge Bath   Able to Wash/Rinse/Dry (body part) Left Arm;Right Arm;L Upper Leg;R Upper Leg;L Lower Leg/Foot;R Lower Leg/Foot;Chest;Abdomen;Perineal Area  (Overall increased time and assist to compelte hygiene throughly)   Limitations Noted in Balance;Coordination;Endurance;Problem Solving;ROM;Safety;Strength;Timeliness   Positioning Seated;Standing   Upper Body Dressing   Type of Assistance Needed Physical assistance   Physical Assistance Level 26%-50%   Comment Up to Mod A; Education regarding use of compensatory technique   Upper Body Dressing CARE Score 3   Lower Body Dressing   Type of Assistance Needed Physical assistance   Physical Assistance Level 51%-75%   Comment Pt trialed use of reacher to doff brief and pants and to don brief and pants however asisst required to fully thread feet through breif and pants and to fully pull up primaily on L side   Lower Body Dressing CARE Score 2   Putting On/Taking Off Footwear   Type of Assistance Needed Physical assistance   Physical Assistance Level 51%-75%   Comment Pt trialed use of reacher to doff socks and use of sock aide to don socks however assist required to fully don sock and apply sock to AD   Putting On/Taking Off Footwear CARE Score 2   Dressing/Undressing Clothing   Remove UB Clothes Pullover Shirt   Don UB Clothes Pullover Shirt   Remove LB Clothes Pants;Undergarment;Socks    Don LB Clothes Pants;Undergarment;Socks   Limitations Noted In Balance;Coordination;Endurance;Problem Solving;Safety;Strength;ROM   Adaptive Equipment Reacher;Sock Aide   Positioning Supported Sit;Standing   Sit to Stand   Type of Assistance Needed Physical assistance   Physical Assistance Level 26%-50%   Comment Up to Min/Mod A and cues for hand placement   Sit to Stand CARE Score 3   Bed-Chair Transfer   Type of Assistance Needed Physical assistance   Physical Assistance Level 51%-75%   Comment Mod A with stand pivot with L UE platform and cues for hand placement and technique recliner to WC   Chair/Bed-to-Chair Transfer CARE Score 2   Toileting Hygiene   Comment   (Pt reported did not need to void this session)   Toilet Transfer   Comment   (Pt reported did not need to void this session)   Assessment   Treatment Assessment Pt received sitting in recliner completeing breakfast and agreeable to participation with OT session. Pt completed bathing and dressing ADL with overall increased time and assist with limitations with L AROM/coordination. Functional transfes compelted with overall up to Mod A and cues for overall safety. Pt would benefit from continued participation with OT services to address bariers and support increased strength, balance and activity tolerence for use with functional transfers and ADL routines and to help facilitate progress towards safe discharge.   Plan   Treatment/Interventions ADL retraining;Functional transfer training;Therapeutic exercise;Endurance training;Patient/family training;Equipment eval/education;Bed mobility;Compensatory technique education   Progress Progressing toward goals   OT Therapy Minutes   OT Time In 0828   OT Time Out 0928   OT Total Time (minutes) 60   OT Mode of treatment - Individual (minutes) 60

## 2024-05-04 NOTE — PROGRESS NOTES
05/04/24 0930   Pain Assessment   Pain Assessment Tool 0-10   Pain Score No Pain   Restrictions/Precautions   Precautions Bed/chair alarms;Cognitive;Fall Risk;Supervision on toilet/commode  (LUE/LLE hemiplegia)   Braces or Orthoses   (DF assist wrap on LLE)   Cognition   Overall Cognitive Status WFL   Arousal/Participation Alert;Responsive;Cooperative   Attention Within functional limits   Orientation Level Oriented X4   Memory Within functional limits   Following Commands Follows one step commands without difficulty   Sit to Stand   Type of Assistance Needed Physical assistance;Verbal cues   Physical Assistance Level 26%-50%   Comment min-mod A at stair rail   Sit to Stand CARE Score 3   Walk 10 Feet   Type of Assistance Needed Physical assistance;Verbal cues   Physical Assistance Level Total assistance   Comment mod A x 1 plus second person for wheelchair follow   Walk 10 Feet CARE Score 1   Ambulation   Primary Mode of Locomotion Prior to Admission Walk   Distance Walked (feet) 13 ft  (13' 11')   Assist Device Platform;Roller Walker   Gait Pattern Inconsistant [place];Slow [place];Decreased foot clearance;Forward Flexion;L foot drag;L hemiparesis;Improper weight shift   Limitations Noted In Balance;Device Management;Endurance;Posture;Safety;Sequencing;Speed;Strength;Swing   Provided Assistance with: Balance;Direction;Limb Stabilization;Trunk Support;Weight Shift   Walk Assist Level Moderate Assist;Chair Follow   Findings mod A x 1 plus second person for wheelchair follow   Does the patient walk? 2. Yes   Wheel 50 Feet with Two Turns   Type of Assistance Needed Physical assistance;Verbal cues   Physical Assistance Level 26%-50%   Comment min-mod A level and unlevel surfaces   Wheel 50 Feet with Two Turns CARE Score 3   Wheel 150 Feet   Type of Assistance Needed Physical assistance;Verbal cues   Physical Assistance Level 26%-50%   Comment min-mod A level and unlevel surfaces   Wheel 150 Feet CARE Score 3    Wheelchair mobility   Does the patient use a wheelchair? 1. Yes   Type of Wheelchair Used 1. Manual   Method Right upper extremity;Right lower extremity   Assistance Provided For Locking Brakes;Obstacles;Remove Leg Rest;Replace Leg Rest   Distance Level Surface (feet) 190 ft   Distance Wheeled 3% Grade 12 ft   Findings min-mod A level and unlevel surfaces   Therapeutic Interventions   Strengthening standing ther ex at stair rail   Balance gait and transfer training   Other wheelchair mobility   Assessment   Treatment Assessment Patient agreeable to therapy session.  No pain reported.    Increased fatigue noted t/o session today, requiring frequent and increased rest periods.  Continues to improve awareness to L side with improved UB/LB weight bearing. Completed ther ex for general LE strengthening; gait and transfer training focusing on sequence and technique for improved balance and safety with functional mobility.  Propels wheelchair min-mod A level and unlevel surfaces.   PT Barriers   Physical Impairment Decreased range of motion;Decreased endurance;Impaired balance;Decreased mobility;Decreased safety awareness;Decreased coordination   Functional Limitation Stair negotiation;Standing;Transfers;Walking;Wheelchair management   Plan   Treatment/Interventions Functional transfer training;LE strengthening/ROM;Therapeutic exercise;Gait training  (wheelchair mobility)   Progress Progressing toward goals   PT Therapy Minutes   PT Time In 0930   PT Time Out 1030   PT Total Time (minutes) 60   PT Mode of treatment - Individual (minutes) 60   PT Mode of treatment - Concurrent (minutes) 0   PT Mode of treatment - Group (minutes) 0   PT Mode of treatment - Co-treat (minutes) 0   PT Mode of Treatment - Total time(minutes) 60 minutes   PT Cumulative Minutes 241   Therapy Time missed   Time missed? No

## 2024-05-04 NOTE — PROGRESS NOTES
05/04/24 1230   Pain Assessment   Pain Assessment Tool 0-10   Pain Score No Pain   Restrictions/Precautions   Precautions Bed/chair alarms;Cognitive;Fall Risk;Supervision on toilet/commode  (L UE/LE hemiplegia)   Weight Bearing Restrictions No   ROM Restrictions No   Toileting Hygiene   Comment   (Pt repored did not need to void)   Toilet Transfer   Comment   (Pt reported did not need to void)   Exercise Tools   Hand Gripper R and L hand digital flex/ext with 5# digiflex, 3x10   Other Exercise Tool 1 Pt completed towel slides with AAROM with R UE to L UE in avaialbe planes, 2x15   Other Exercise Tool 2 Pt completed sup/pro with Yellow Flexbar with R and L Ue with hand over hand assist to L UE for positioning and pacing   Other Exercise Tool 3 Pt utilized B/L hands to unite knots from [employer]   Cognition   Overall Cognitive Status WFL   Arousal/Participation Alert;Responsive;Cooperative   Additional Activities   Additional Activities Other (Comment)   Additional Activities Comments WC mobility to self propel within facility hallways and Pts room   Other Comments   Assessment Pt participated with 45 minutes concurrent tx with Pt with similar goals with focus of overall strengthening to support increased safety and independence with ADL routines   Assessment   Treatment Assessment Pt received sitting in WC and agreeable to participation with OT session. Pt indicated overall fatigue however without reported discomfort. Pt indicated good motivation to participate with therapy services. Focus with ROM/strengthening tasks to help support progress towards imrpoved strength and functional use with L Ue to allow Pt to particiapte with self care tasks and functional transfers with increased safety and indepnendence. Pt would benefit from continued participation with OT services to address limitations with strength, balance, coordinaiton and activity tolerence for use with ADL routines adn functioanl transfers and  help support progress towards safe discharge.   Plan   Treatment/Interventions ADL retraining;Functional transfer training;Therapeutic exercise;Endurance training;Patient/family training;Equipment eval/education;Bed mobility;Compensatory technique education   Progress Progressing toward goals   OT Therapy Minutes   OT Time In 1230   OT Time Out 1330   OT Total Time (minutes) 60   OT Mode of treatment - Individual (minutes) 15   OT Mode of treatment - Concurrent (minutes) 45

## 2024-05-04 NOTE — NURSING NOTE
Left sided weakness, assist of 2 with transfers and toileting.  Pt able to hold urinal and place himself.  Offers no complaints.  OT ADLs this a.m.  Continue same plan of care.  Meds as ordered.  Seizure precautions maintained.

## 2024-05-05 PROCEDURE — 97542 WHEELCHAIR MNGMENT TRAINING: CPT

## 2024-05-05 PROCEDURE — 97530 THERAPEUTIC ACTIVITIES: CPT

## 2024-05-05 PROCEDURE — 97110 THERAPEUTIC EXERCISES: CPT

## 2024-05-05 RX ADMIN — PRAMIPEXOLE DIHYDROCHLORIDE 0.25 MG: 0.12 TABLET ORAL at 08:34

## 2024-05-05 RX ADMIN — ENOXAPARIN SODIUM 40 MG: 40 INJECTION SUBCUTANEOUS at 08:33

## 2024-05-05 RX ADMIN — ATORVASTATIN CALCIUM 40 MG: 40 TABLET, FILM COATED ORAL at 17:49

## 2024-05-05 RX ADMIN — PRAMIPEXOLE DIHYDROCHLORIDE 0.25 MG: 0.12 TABLET ORAL at 20:21

## 2024-05-05 RX ADMIN — LEVETIRACETAM 2000 MG: 500 TABLET, FILM COATED ORAL at 08:35

## 2024-05-05 RX ADMIN — CLOBAZAM 5 MG: 10 TABLET ORAL at 17:48

## 2024-05-05 RX ADMIN — LOSARTAN POTASSIUM 50 MG: 50 TABLET, FILM COATED ORAL at 08:35

## 2024-05-05 RX ADMIN — LACOSAMIDE 200 MG: 150 TABLET ORAL at 08:33

## 2024-05-05 RX ADMIN — PANTOPRAZOLE SODIUM 40 MG: 40 TABLET, DELAYED RELEASE ORAL at 05:36

## 2024-05-05 RX ADMIN — AMLODIPINE BESYLATE 10 MG: 10 TABLET ORAL at 08:34

## 2024-05-05 RX ADMIN — PRAMIPEXOLE DIHYDROCHLORIDE 0.25 MG: 0.12 TABLET ORAL at 17:48

## 2024-05-05 RX ADMIN — CLOBAZAM 5 MG: 10 TABLET ORAL at 08:34

## 2024-05-05 RX ADMIN — LEVETIRACETAM 2000 MG: 500 TABLET, FILM COATED ORAL at 20:20

## 2024-05-05 RX ADMIN — DEXAMETHASONE 4 MG: 4 TABLET ORAL at 08:35

## 2024-05-05 RX ADMIN — LACOSAMIDE 200 MG: 150 TABLET ORAL at 20:18

## 2024-05-05 NOTE — PROGRESS NOTES
05/05/24 1100   Pain Assessment   Pain Assessment Tool 0-10   Pain Score No Pain   Restrictions/Precautions   Precautions Bed/chair alarms;Cognitive;Fall Risk;Supervision on toilet/commode   Weight Bearing Restrictions No   ROM Restrictions No   Cognition   Overall Cognitive Status WFL   Arousal/Participation Alert;Responsive;Cooperative   Attention Within functional limits   Orientation Level Oriented X4   Memory Within functional limits   Following Commands Follows one step commands without difficulty   Sit to Stand   Type of Assistance Needed Physical assistance   Physical Assistance Level 51%-75%   Comment mod A   Sit to Stand CARE Score 2   Car Transfer   Reason if not Attempted Environmental limitations   Car Transfer CARE Score 10   Wheel 50 Feet with Two Turns   Type of Assistance Needed Physical assistance   Physical Assistance Level 26%-50%   Comment min A for direction   Wheel 50 Feet with Two Turns CARE Score 3   Wheelchair mobility   Does the patient use a wheelchair? 1. Yes   Type of Wheelchair Used 1. Manual   Method Right upper extremity;Right lower extremity   Assistance Provided For Locking Brakes   Distance Level Surface (feet) 100 ft  (x2)   Therapeutic Interventions   Strengthening seated ther ex   Balance STS   Other WC mobility   Assessment   Treatment Assessment Pt seated in  to start session, pleasant and agreeable. Pt able to complete ther ex with AAROM to LLE. Pt able to propell ' with min A and VC for direction. Pt able to complete multiple STS with Mod A and VC for hand and foot placement. Pt seated in  to end session with call bell in reach and all needs met. Pt will benefit from continued skilled PT to improve over allstrength and endurence.   PT Barriers   Physical Impairment Decreased range of motion;Decreased endurance;Impaired balance;Decreased mobility;Decreased safety awareness;Decreased coordination   Functional Limitation Stair  negotiation;Standing;Transfers;Walking;Wheelchair management   Plan   Treatment/Interventions Functional transfer training;LE strengthening/ROM;Elevations;Therapeutic exercise;Endurance training;Bed mobility   Progress Progressing toward goals   PT Therapy Minutes   PT Time In 1100   PT Time Out 1200   PT Total Time (minutes) 60   PT Mode of treatment - Individual (minutes) 30   PT Mode of treatment - Concurrent (minutes) 30   PT Mode of treatment - Group (minutes) 0   PT Mode of treatment - Co-treat (minutes) 0   PT Mode of Treatment - Total time(minutes) 60 minutes   PT Cumulative Minutes 301   Therapy Time missed   Time missed? No

## 2024-05-06 LAB
ALBUMIN SERPL BCP-MCNC: 3.8 G/DL (ref 3.5–5)
ALP SERPL-CCNC: 32 U/L (ref 34–104)
ALT SERPL W P-5'-P-CCNC: 17 U/L (ref 7–52)
ANION GAP SERPL CALCULATED.3IONS-SCNC: 6 MMOL/L (ref 4–13)
AST SERPL W P-5'-P-CCNC: 15 U/L (ref 13–39)
BASOPHILS # BLD MANUAL: 0 THOUSAND/UL (ref 0–0.1)
BASOPHILS NFR MAR MANUAL: 0 % (ref 0–1)
BILIRUB SERPL-MCNC: 0.48 MG/DL (ref 0.2–1)
BUN SERPL-MCNC: 17 MG/DL (ref 5–25)
CALCIUM SERPL-MCNC: 8.5 MG/DL (ref 8.4–10.2)
CHLORIDE SERPL-SCNC: 106 MMOL/L (ref 96–108)
CO2 SERPL-SCNC: 29 MMOL/L (ref 21–32)
CREAT SERPL-MCNC: 1.05 MG/DL (ref 0.6–1.3)
EOSINOPHIL # BLD MANUAL: 0 THOUSAND/UL (ref 0–0.4)
EOSINOPHIL NFR BLD MANUAL: 0 % (ref 0–6)
ERYTHROCYTE [DISTWIDTH] IN BLOOD BY AUTOMATED COUNT: 15.5 % (ref 11.6–15.1)
GFR SERPL CREATININE-BSD FRML MDRD: 71 ML/MIN/1.73SQ M
GLUCOSE SERPL-MCNC: 151 MG/DL (ref 65–140)
HCT VFR BLD AUTO: 37.7 % (ref 36.5–49.3)
HGB BLD-MCNC: 12.3 G/DL (ref 12–17)
LYMPHOCYTES # BLD AUTO: 1.93 THOUSAND/UL (ref 0.6–4.47)
LYMPHOCYTES # BLD AUTO: 24 % (ref 14–44)
MCH RBC QN AUTO: 31.7 PG (ref 26.8–34.3)
MCHC RBC AUTO-ENTMCNC: 32.6 G/DL (ref 31.4–37.4)
MCV RBC AUTO: 97 FL (ref 82–98)
MONOCYTES # BLD AUTO: 0.48 THOUSAND/UL (ref 0–1.22)
MONOCYTES NFR BLD: 6 % (ref 4–12)
NEUTROPHILS # BLD MANUAL: 5.64 THOUSAND/UL (ref 1.85–7.62)
NEUTS SEG NFR BLD AUTO: 70 % (ref 43–75)
PLATELET # BLD AUTO: 179 THOUSANDS/UL (ref 149–390)
PLATELET BLD QL SMEAR: ADEQUATE
PMV BLD AUTO: 9.9 FL (ref 8.9–12.7)
POTASSIUM SERPL-SCNC: 3.6 MMOL/L (ref 3.5–5.3)
PROT SERPL-MCNC: 5.9 G/DL (ref 6.4–8.4)
RBC # BLD AUTO: 3.88 MILLION/UL (ref 3.88–5.62)
RBC MORPH BLD: PRESENT
SODIUM SERPL-SCNC: 141 MMOL/L (ref 135–147)
WBC # BLD AUTO: 8.05 THOUSAND/UL (ref 4.31–10.16)

## 2024-05-06 PROCEDURE — 97530 THERAPEUTIC ACTIVITIES: CPT

## 2024-05-06 PROCEDURE — 85027 COMPLETE CBC AUTOMATED: CPT

## 2024-05-06 PROCEDURE — 97110 THERAPEUTIC EXERCISES: CPT

## 2024-05-06 PROCEDURE — 99233 SBSQ HOSP IP/OBS HIGH 50: CPT | Performed by: PHYSICAL MEDICINE & REHABILITATION

## 2024-05-06 PROCEDURE — 97542 WHEELCHAIR MNGMENT TRAINING: CPT

## 2024-05-06 PROCEDURE — 85007 BL SMEAR W/DIFF WBC COUNT: CPT

## 2024-05-06 PROCEDURE — 97116 GAIT TRAINING THERAPY: CPT

## 2024-05-06 PROCEDURE — 97112 NEUROMUSCULAR REEDUCATION: CPT

## 2024-05-06 PROCEDURE — 80053 COMPREHEN METABOLIC PANEL: CPT

## 2024-05-06 PROCEDURE — 92507 TX SP LANG VOICE COMM INDIV: CPT | Performed by: NURSE PRACTITIONER

## 2024-05-06 RX ORDER — LACOSAMIDE 150 MG/1
TABLET ORAL
Status: DISPENSED
Start: 2024-05-06 | End: 2024-05-06

## 2024-05-06 RX ORDER — LACOSAMIDE 50 MG/1
TABLET ORAL
Status: DISPENSED
Start: 2024-05-06 | End: 2024-05-06

## 2024-05-06 RX ADMIN — PRAMIPEXOLE DIHYDROCHLORIDE 0.25 MG: 0.12 TABLET ORAL at 15:47

## 2024-05-06 RX ADMIN — ATORVASTATIN CALCIUM 40 MG: 40 TABLET, FILM COATED ORAL at 17:45

## 2024-05-06 RX ADMIN — PRAMIPEXOLE DIHYDROCHLORIDE 0.25 MG: 0.12 TABLET ORAL at 09:21

## 2024-05-06 RX ADMIN — AMLODIPINE BESYLATE 10 MG: 10 TABLET ORAL at 09:22

## 2024-05-06 RX ADMIN — LACOSAMIDE 200 MG: 150 TABLET ORAL at 11:36

## 2024-05-06 RX ADMIN — CLOBAZAM 5 MG: 10 TABLET ORAL at 09:21

## 2024-05-06 RX ADMIN — LOSARTAN POTASSIUM 50 MG: 50 TABLET, FILM COATED ORAL at 09:22

## 2024-05-06 RX ADMIN — ENOXAPARIN SODIUM 40 MG: 40 INJECTION SUBCUTANEOUS at 09:21

## 2024-05-06 RX ADMIN — LACOSAMIDE 200 MG: 150 TABLET ORAL at 20:07

## 2024-05-06 RX ADMIN — CLOBAZAM 5 MG: 10 TABLET ORAL at 17:46

## 2024-05-06 RX ADMIN — PRAMIPEXOLE DIHYDROCHLORIDE 0.25 MG: 0.12 TABLET ORAL at 20:07

## 2024-05-06 RX ADMIN — LEVETIRACETAM 2000 MG: 500 TABLET, FILM COATED ORAL at 20:07

## 2024-05-06 RX ADMIN — LEVETIRACETAM 2000 MG: 500 TABLET, FILM COATED ORAL at 08:05

## 2024-05-06 RX ADMIN — DEXAMETHASONE 4 MG: 4 TABLET ORAL at 09:22

## 2024-05-06 RX ADMIN — PANTOPRAZOLE SODIUM 40 MG: 40 TABLET, DELAYED RELEASE ORAL at 05:08

## 2024-05-06 NOTE — PROGRESS NOTES
05/06/24 1620   Pain Assessment   Pain Assessment Tool 0-10   Pain Score No Pain   Restrictions/Precautions   Precautions Bed/chair alarms;Cognitive;Fall Risk;Seizure;Supervision on toilet/commode   Comprehension   Assist Devices Glasses   Comprehension (FIM) 5 - Needs help/cues, repetition only RARELY ( < 10% of the time)   Expression   Expression (FIM) 5 - Needs help/cues only RARELY (< 10% of the time)   Social Interaction   Social Interaction (FIM) 5 - Interacts appropriately with others 90% of time   Problem Solving   Problem solving (FIM) 5 - Solves basic problems 90% of time   Memory   Memory (FIM) 5 - Needs cueing reminders <10%   Speech/Language/Cognition Assessmetn   Treatment Assessment abstract divergent categorization provided 1 letter @65% dasia. WF x5 across conversation in session with use of strategies occasional min cue to retrieve the word. Metnal fatigue reported with category members.   SLP Therapy Minutes   SLP Time In 1620   SLP Time Out 1720   SLP Total Time (minutes) 60   SLP Mode of treatment - Individual (minutes) 60   SLP Mode of treatment - Concurrent (minutes) 0   SLP Mode of treatment - Group (minutes) 0   SLP Mode of treatment - Co-treat (minutes) 0   SLP Mode of Treatment - Total time(minutes) 60 minutes   SLP Cumulative Minutes 270

## 2024-05-06 NOTE — PLAN OF CARE
Problem: PAIN - ADULT  Goal: Verbalizes/displays adequate comfort level or baseline comfort level  Description: Interventions:  - Encourage patient to monitor pain and request assistance  - Assess pain using appropriate pain scale  - Administer analgesics based on type and severity of pain and evaluate response  - Implement non-pharmacological measures as appropriate and evaluate response  - Consider cultural and social influences on pain and pain management  - Notify physician/advanced practitioner if interventions unsuccessful or patient reports new pain  Outcome: Progressing     Problem: INFECTION - ADULT  Goal: Absence or prevention of progression during hospitalization  Description: INTERVENTIONS:  - Assess and monitor for signs and symptoms of infection  - Monitor lab/diagnostic results  - Monitor all insertion sites, i.e. indwelling lines, tubes, and drains  - Monitor endotracheal if appropriate and nasal secretions for changes in amount and color  - [place] appropriate cooling/warming therapies per order  - Administer medications as ordered  - Instruct and encourage patient and family to use good hand hygiene technique  - Identify and instruct in appropriate isolation precautions for identified infection/condition  Outcome: Progressing     Problem: SAFETY ADULT  Goal: Patient will remain free of falls  Description: INTERVENTIONS:  - Educate patient/family on patient safety including physical limitations  - Instruct patient to call for assistance with activity   - Consult OT/PT to assist with strengthening/mobility   - Keep Call bell within reach  - Keep bed low and locked with side rails adjusted as appropriate  - Keep care items and personal belongings within reach  - Initiate and maintain comfort rounds  - Make Fall Risk Sign visible to staff  - Initiate/Maintain bed/chair alarm  - Apply yellow socks and bracelet for high fall risk patients  - Consider moving patient to room near nurses  station  Outcome: Progressing

## 2024-05-06 NOTE — PROGRESS NOTES
"Progress Note - Paras Pitts 68 y.o. male MRN: [id number]    Unit/Bed#: [facility] 213-02 Encounter: [id number]        Subjective:   Patient seen and examined at bedside after reviewing the chart and discussing the case with the caring staff.      Patient examined at bedside.  Patient has no acute symptoms.    Patient's labs from 5/6/2024 were reviewed.  Patient's CMP showed glucose 151.  Patient's CBC was within normal limits.    Physical Exam   Vitals: Blood pressure 144/78, pulse 74, temperature 98.1 °F (36.7 °C), temperature source Temporal, resp. rate 16, height 5' 9\" (1.753 m), weight 91.6 kg (201 lb 15.1 oz), SpO2 97%.,Body mass index is 29.82 kg/m².  Constitutional: Patient in no acute distress.  HEENT: PERR, EOMI, MMM.  Cardiovascular: Normal rate and regular rhythm.    Pulmonary/Chest: Effort normal and breath sounds normal.   Abdomen: Soft, + BS, NT.    Assessment/Plan:  Paras Pitts is a(n) 68 y.o. male with tremors of      Cardiac Hx w/ HTN, HLD, hx of CVA. Continue amlodipine 10 mg daily, Losartan 50 mg daily, atorvastatin 40 mg daily.  Type 2 diabetes mellitus.  Hgb A1c 6.7% on 5/3/24.  Carb controlled diet.  I may consider starting the patient on Metformin or Januvia.  GERD. Continue Protonix 40 mg daily.  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 Stage [place]. S/p robotic converted to right thoracotomy and right upper lobectomy on 9/1/2023. S/p bronchoscopy with EBUS at [facility] on 4/16/2024. Continue Decadron 4 mg daily. Patient completed CT_SIM at [facility] 4/29. Further radiation treatment in 1-2 weeks per oncologist, Dr. Contreras.  Pain and bowel regimen. As per physiatry.  Tremors of the nervous system. Continue Keppra twice daily Vimpat 100 mg twice daily, clobazam 0.5 mg twice daily. Follow up in 4 weeks with epilepsy attending. Patient receiving intensive PT OT as per physiatry.     Anticipated date of discharge.  TBD.  "

## 2024-05-06 NOTE — PROGRESS NOTES
PM&R PROGRESS NOTE:  Paras Pitts 68 y.o. male MRN: [id number]  Unit/Bed#: -02 Encounter: [id number]        Rehab Diagnosis: Impairment of mobility, safety, Activities of Daily Living (ADLs), and cognitive/communication skills due to Brain Dysfunction:  02.1  Non-Traumatic  Etiologic Diagnosis: New onset epilepsy/New onset seizure disorder in the setting new right frontal lobe vasogenic edema and radiation necrosis related to metastatic adenocarcinoma right frontal brain mass    HPI: Paras Pitts is a 68 y.o. 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  CT head showing right frontal lobe vasogenic edema with known underlying lesion.  MRI brain showing a right superior frontal mass resection and chemoradiation with unchanged linear enhancement along the surgical cavity compared to 3/26/2024 favoring radiation necrosis.  Patient seen by neurology.  Video EEG showing: Early in recording there was frequent or nearly continuous focal motor seizure and later there were two subclinical right fronto central electrographic seizures.  Patient placed on the following AED regimen: Keppra 2 g twice daily, Vimpat 200 mg twice daily, Onfi 5 mg twice daily.  Of note patient was significantly sedated with Ativan.   Patient to follow-up with epileptologist as an outpatient.  Patient was seen by radiation oncology regarding edema and  radiation necrosis and recommended to increase dexamethasone to 4 mg twice daily.  Patient is under the care of Dr. Langford for medical oncology and is to begin Avastin, chemotherapy radiation therapy for his recurrent mediastinal disease post rehabilitation.  After medical stabilization, patient found to have acute functional deficits from his mobility and self-care, therefore admitted to [facility] for acute inpatient rehabilitation.    SUBJECTIVE: Patient seen face-to-face.  Feeling more improvement in his left arm and leg function.  Encouraged by this.  Denies pain.  Denies headaches.  No further twitching in the left upper extremities.    ASSESSMENT: Stable, progressing      PLAN:    Rehabilitation  Functional deficits:  Left hemiparesis, left inattention, impaired balance, impaired mobility, self care   Continue current rehabilitation plan of care to maximize function.    Functional update:   Ambulating mod assist 33 feet with platform rolling walker.  Wheelchair mobility min assist level  Estimated Discharge: TBD    DVT prophylaxis  Continue subcutaneous Lovenox 40 mg daily     Bladder plan  Continent and voiding     Bowel plan  Continent  Multiple bowel movements today therefore bowel regimen was changed to as needed      * Seizure disorder (HCC)  Assessment & Plan  New onset seizures presenting for 2024  CT head showing right frontal lobe vasogenic edema with known underlying lesion.    MRI brain showing a right superior frontal mass resection and chemoradiation with unchanged linear enhancement along the surgical cavity compared to 3/26/2024 favoring radiation necrosis.    Patient seen by neurology.    Video EEG showing: Early in recording there was frequent or nearly continuous focal motor seizure and later there were two subclinical right fronto central electrographic seizures.    Patient placed on the following AED regimen: Keppra 2 g twice daily, Vimpat 200 mg twice daily, Onfi 5 mg twice daily.   *Of note patient was significantly sedated with Ativan.  Continue seizure precautions     Patient to follow-up with neurology-epileptologist as an outpatient.    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  Dexamethasone was increased to 4 mg twice daily.    Patient is under the care of Dr. Langford for medical oncology and is to begin Avastin, chemotherapy radiation therapy for his recurrent mediastinal disease post rehabilitation.    Left hemiparesis (HCC)  Assessment & Plan  Worsening left hemiparesis likely related to recent radiation necrosis and vasogenic edema, seizures  Continue Decadron 4 mg twice daily  Continue acute rehabilitation program to maximize function      Brain mass  Assessment & Plan  Patient with known metastatsis to right frontal lobe s/p right frontal craniotomy in March 2023  Residual mild left hemiparesis but remained highly functional and independent.  Patient status post chemotherapy radiation SRT, immunotherapy  Follows with Dr. Langford    Adjustment disorder  Assessment & Plan  May benefit from a neuropsychology consultation if patient is agreeable    Pseudobulbar affect  Assessment & Plan  At times can be tearful and emotionally labile  May benefit from a future SSRI  Will monitor      History of CVA (cerebrovascular accident)  Assessment & Plan  Patient with ischemic CVA in 2011  Resultant mild left hemiparesis    Hypercholesterolemia  Assessment & Plan  Continue Lipitor 40 mg daily (home dose)    Essential hypertension  Assessment & Plan  Continue Norvasc 10 mg daily and losartan 50 mg  "daily (home dose)  Monitor BP and heart rate during rest and with activity  Internal medicine managing    Overweight (BMI 25.0-29.9)  Assessment & Plan  Dietary and lifestyle changes when able        Appreciate IM consultants medical co-management.  Labs, medications, and imaging personally reviewed.      ROS:  A ten point review of systems was completed on 05/06/24 and pertinent positives are listed in subjective section. All other systems reviewed were negative.     OBJECTIVE:   /78 (BP Location: Right arm)   Pulse 74   Temp 98.1 °F (36.7 °C) (Temporal)   Resp 16   Ht 5' 9\" (1.753 m)   Wt 91.6 kg (201 lb 15.1 oz)   SpO2 97%   BMI 29.82 kg/m²     Physical Exam  Vitals and nursing note reviewed.   Constitutional:       General: He is not in acute distress.  HENT:      Head: Normocephalic and atraumatic.      Nose: Nose normal.      Mouth/Throat:      Mouth: Mucous membranes are moist.   Eyes:      Conjunctiva/sclera: Conjunctivae normal.   Cardiovascular:      Rate and Rhythm: Normal rate and regular rhythm.      Pulses: Normal pulses.   Pulmonary:      Effort: Pulmonary effort is normal.      Breath sounds: Normal breath sounds. No wheezing or rales.   Abdominal:      General: Bowel sounds are normal. There is no distension.      Palpations: Abdomen is soft.      Tenderness: There is no abdominal tenderness.   Musculoskeletal:         General: No swelling.      Cervical back: Neck supple.   Skin:     General: Skin is warm.   Neurological:      Mental Status: He is alert and oriented to person, place, and time.      Motor: Weakness present.      Comments: Left upper extremity motor exam: 3 -/5 proximally, 4/5 distally  Left lower extremity motor exam: 3 -/5 hip flexion, knee flexion, 1/5 plantarflexion   Psychiatric:         Mood and Affect: Mood normal.          Lab Results   Component Value Date    WBC 8.05 05/06/2024    HGB 12.3 05/06/2024    HCT 37.7 05/06/2024    MCV 97 05/06/2024     " 05/06/2024     Lab Results   Component Value Date    SODIUM 141 05/06/2024    K 3.6 05/06/2024     05/06/2024    CO2 29 05/06/2024    BUN 17 05/06/2024    CREATININE 1.05 05/06/2024    GLUC 151 (H) 05/06/2024    CALCIUM 8.5 05/06/2024     Lab Results   Component Value Date    INR 0.92 04/20/2024    INR 0.98 08/30/2023    INR 1.06 03/24/2023    PROTIME 12.6 04/20/2024    PROTIME 13.2 08/30/2023    PROTIME 14.0 03/24/2023           Current Facility-Administered Medications:     acetaminophen (TYLENOL) tablet 975 mg, 975 mg, Oral, Q8H PRN, Jenise Dawson PA-C    aluminum-magnesium hydroxide-simethicone (MAALOX) oral suspension 30 mL, 30 mL, Oral, Q6H PRN, Jenise Dawson PA-C    amLODIPine (NORVASC) tablet 10 mg, 10 mg, Oral, Daily, Jenise Dawson PA-C, 10 mg at 05/06/24 0922    atorvastatin (LIPITOR) tablet 40 mg, 40 mg, Oral, After Dinner, Jenise Dawson PA-C, 40 mg at 05/05/24 1749    bisacodyl (DULCOLAX) rectal suppository 10 mg, 10 mg, Rectal, Daily PRN, Jenise Dawson PA-C    cloBAZam (ONFI) tablet 5 mg, 5 mg, Oral, BID, Jenise Dawson PA-C, 5 mg at 05/06/24 0921    dexamethasone (DECADRON) tablet 4 mg, 4 mg, Oral, Daily, Jenise Dawson PA-C, 4 mg at 05/06/24 0922    docusate sodium (COLACE) capsule 100 mg, 100 mg, Oral, BID PRN, Corina Arita MD    enoxaparin (LOVENOX) subcutaneous injection 40 mg, 40 mg, Subcutaneous, Daily, Jenise Dawson PA-C, 40 mg at 05/06/24 0921    lacosamide (VIMPAT) tablet 200 mg, 200 mg, Oral, Q12H LEN, Jenise Dawson PA-C, 200 mg at 05/05/24 2018    levETIRAcetam (KEPPRA) tablet 2,000 mg, 2,000 mg, Oral, Q12H LEN, Jenise Dawson PA-C, 2,000 mg at 05/06/24 0805    losartan (COZAAR) tablet 50 mg, 50 mg, Oral, Daily, Jenise Dawson PA-C, 50 mg at 05/06/24 0922    ondansetron (ZOFRAN-ODT) dispersible tablet 4 mg, 4 mg, Oral, Q6H PRN, Jenise Dawson PA-C    pantoprazole  (PROTONIX) EC tablet 40 mg, 40 mg, Oral, Early Morning, Jenise Dawson PA-C, 40 mg at 05/06/24 0508    polyethylene glycol (MIRALAX) packet 17 g, 17 g, Oral, Daily PRN, Corina Arita MD    pramipexole (MIRAPEX) tablet 0.25 mg, 0.25 mg, Oral, TID, Jenise Dawson PA-C, 0.25 mg at 05/06/24 0921    Past Medical History:   Diagnosis Date    Brain compression (HCC) 03/20/2023    Brain mass 03/20/2023    Cancer (HCC) 2001    Receint lung and brain lesions and prostate cancer in 2001    Cerebral edema (HCC) 03/20/2023    Hypertension     Prostate cancer (HCC) 2001    Rectal bleeding     Stroke (HCC)     2011         Patient Active Problem List    Diagnosis Date Noted    Seizure disorder (HCC) 05/02/2024    Metastatic adenocarcinoma (HCC) 2023    Left hemiparesis (HCC) 05/02/2024    Brain mass 03/20/2023    Frontal mass of brain 05/02/2024    Pseudobulbar affect 05/02/2024    Adjustment disorder 05/02/2024    Constipation by delayed colonic transit 04/25/2024    Seizure-like activity (HCC) 04/23/2024    Generalized weakness 04/20/2024    Tremors of nervous system 04/20/2024    History of CVA (cerebrovascular accident) 06/22/2023    Chemotherapy-induced neutropenia (HCC) 05/03/2023    Encounter for central line care 05/01/2023    Carcinoma of right lung (HCC) 04/13/2023    Lung nodule 03/21/2023    Hypercholesterolemia 09/20/2021    Essential hypertension 09/11/2020    Annual physical exam 09/11/2020    Negative depression screening 02/24/2020    Overweight (BMI 25.0-29.9) 02/24/2020          Corina Arita MD    I have spent a total time of 40 minutes on 05/06/24 in caring for this patient including Impressions, Counseling / Coordination of care, Documenting in the medical record, Reviewing / ordering tests, medicine, procedures  , Obtaining or reviewing history  , and Communicating with other healthcare professionals .      ** Please Note:  voice to text software may have been used in the  creation of this document. Although proof errors in transcription or interpretation are a potential of such software**

## 2024-05-06 NOTE — PROGRESS NOTES
1234   05/06/24 1234   Pain Assessment   Pain Assessment Tool 0-10   Pain Score No Pain   Restrictions/Precautions   Precautions Bed/chair alarms;Cognitive;Fall Risk;Seizure;Supervision on toilet/commode   Weight Bearing Restrictions No   ROM Restrictions No   Grooming   Able To Wash/Dry Hands   Limitation Noted In Coordination;Safety;Strength   Findings standing at sink modA; pt able to reach across midline towards soap dispenser for OTS to administer soap.   Sit to Stand   Type of Assistance Needed Physical assistance   Physical Assistance Level 26%-50%   Comment min-modA   Sit to Stand CARE Score 3   Exercise Tools   Theraputty pt retrieved 8 items from yellow putty to work on improving bilat FMC.   Hand Gripper pt placed pegs into wooden pegboard using L digits, required increased time   Other Exercise Tool 1 towel slides with AAROM with R UE to L UE in avaialbe planes, 20 reps each   Neuromuscular Education   Functional Movement Patterns Attempted use of Sa[place]o MAS to aid pt in removing pegs with squeezer. Saebo MAS impeded pt's ability to depress his shoulder enough to adequately complete the activity. Saebo MAS removed to improve quality of AROM to complete activity.   Cognition   Overall Cognitive Status WFL   Arousal/Participation Alert;Responsive;Cooperative   Attention Attends with cues to redirect   Orientation Level Oriented X4   Memory Within functional limits   Following Commands Follows one step commands with increased time or repetition   Additional Activities   Additional Activities Other (Comment)   Additional Activities Comments pt completed static standing at sink while washing hands. ModA required to maintain balance.   Activity Tolerance   Activity Tolerance Patient tolerated treatment well   Assessment   Treatment Assessment Pt agreeable to skilled OT session this PM, focusing on UB ROM/strength, fine motor coordination, and  strength; details listed in respective sections. Pt recieved in  w/c. Pt able to complete static standing while washing hands with modA to maintain balance. Pt required increased time for activities/exercises, yet pt showed high level of motivation to finish activities. Throughout the session, pt easily distracted by conversation, required occasional cues to redirect. Pt's barriers to d/c include decreased strength throughout but especially L side s/p previous CVA, decreased balance, impaired coordination L side, impaired safety awareness, problem solving, and attention, and decreased activity tolerance; all affect independence in self care and fxl transfers. Pt would benefit from continued skilled OT services in order to address listed barriers and prepare for safe d/c.   Prognosis Good   Problem List Decreased strength;Decreased range of motion;Decreased endurance;Impaired balance;Decreased coordination;Decreased cognition;Impaired judgement;Decreased safety awareness   Plan   Treatment/Interventions ADL retraining;Functional transfer training;Therapeutic exercise;Endurance training;Cognitive reorientation;Patient/family training;Equipment eval/education;Bed mobility;Compensatory technique education;OT   Progress Progressing toward goals   OT Therapy Minutes   OT Time In 1234   OT Time Out 1407   OT Total Time (minutes) 93   OT Mode of treatment - Individual (minutes) 93   Therapy Time missed   Time missed? No

## 2024-05-06 NOTE — PROGRESS NOTES
05/06/24 0859   Pain Assessment   Pain Assessment Tool 0-10   Pain Score No Pain   Restrictions/Precautions   Precautions Bed/chair alarms;Cognitive;Fall Risk;Seizure;Supervision on toilet/commode   Cognition   Overall Cognitive Status WFL   Orientation Level Oriented X4   Sit to Stand   Type of Assistance Needed Physical assistance;Verbal cues   Physical Assistance Level 26%-50%   Comment mod A with PFRW   Sit to Stand CARE Score 3   Walk 10 Feet   Type of Assistance Needed Physical assistance;Verbal cues   Physical Assistance Level Total assistance   Comment mod A level surfaces with PFRW; second person for wheelchair follow   Walk 10 Feet CARE Score 1   Ambulation   Primary Mode of Locomotion Prior to Admission Walk   Distance Walked (feet) 33 ft  (24' 5' 33')   Assist Device Platform;Roller Walker   Gait Pattern Inconsistant Chioma;Slow Chioma;Decreased foot clearance;L foot drag;Forward Flexion;L hemiparesis;Narrow ABHINAV;Step to;Decreased L stance;Improper weight shift   Limitations Noted In Balance;Coordination;Device Management;Endurance;Posture;Safety;Speed;Strength   Provided Assistance with: Balance;Trunk Support;Weight Shift   Walk Assist Level Moderate Assist;Chair Follow   Findings mod A level surfaces with PFRW; second person for wheelchair follow   Does the patient walk? 2. Yes   Wheel 50 Feet with Two Turns   Type of Assistance Needed Physical assistance;Verbal cues   Physical Assistance Level 26%-50%   Comment min A level and unlevel surfaces   Wheel 50 Feet with Two Turns CARE Score 3   Wheel 150 Feet   Type of Assistance Needed Physical assistance;Verbal cues   Physical Assistance Level 26%-50%   Comment min A level and unlevel surfaces   Wheel 150 Feet CARE Score 3   Wheelchair mobility   Does the patient use a wheelchair? 1. Yes   Type of Wheelchair Used 1. Manual   Method Right upper extremity;Right lower extremity   Assistance Provided For Locking Brakes;Remove Leg Rest;Replace Leg Rest    Distance Level Surface (feet) 189 ft  (189' 134')   Distance Wheeled 3% Grade 12 ft   Findings min A level and unlevel surfaces   Therapeutic Interventions   Strengthening seated ther ex; AROM RLE; AAROM LE   Balance gait and transfer training   Other wheelchair mobility   Assessment   Treatment Assessment Patient agreeable to therapy session.   No pain reported.   Cues for general safety.   Improved ROM LLE noted today with improved functional mobility demonstrated.   Continues to fatigue easily requiring frequent rest periods t/o therapy session.   Patient remains motivated to improve.  Completed ther ex for general LE strengthening; gait and transfer training focusing on sequence and technique for improved balance and safety with functional mobility using PFRW.  Propels wheelchair over level and unlevel surfaces with MIN A.   PT Barriers   Physical Impairment Decreased range of motion;Decreased endurance;Impaired balance;Decreased mobility;Decreased safety awareness;Decreased coordination   Functional Limitation Wheelchair management;Walking;Transfers;Standing;Stair negotiation;Ramp negotiation;Car transfers   Plan   Treatment/Interventions Functional transfer training;LE strengthening/ROM;Therapeutic exercise   PT Therapy Minutes   PT Time In 0859   PT Time Out 1000   PT Total Time (minutes) 61   PT Mode of treatment - Individual (minutes) 61   PT Mode of treatment - Concurrent (minutes) 0   PT Mode of treatment - Group (minutes) 0   PT Mode of treatment - Co-treat (minutes) 0   PT Mode of Treatment - Total time(minutes) 61 minutes   PT Cumulative Minutes 362   Therapy Time missed   Time missed? No

## 2024-05-06 NOTE — NURSING NOTE
Assist of 2 with transfers.  Tolerating therapy.  Offers no complaints of pain or any discomfort.  Left sided weakness.  Safety maintained, callbell within reach at all times.

## 2024-05-07 ENCOUNTER — TELEPHONE (OUTPATIENT)
Dept: HEMATOLOGY ONCOLOGY | Facility: CLINIC | Age: 69
End: 2024-05-07

## 2024-05-07 PROCEDURE — 97530 THERAPEUTIC ACTIVITIES: CPT

## 2024-05-07 PROCEDURE — 97110 THERAPEUTIC EXERCISES: CPT

## 2024-05-07 PROCEDURE — 97542 WHEELCHAIR MNGMENT TRAINING: CPT

## 2024-05-07 PROCEDURE — 99232 SBSQ HOSP IP/OBS MODERATE 35: CPT | Performed by: PHYSICAL MEDICINE & REHABILITATION

## 2024-05-07 PROCEDURE — 97116 GAIT TRAINING THERAPY: CPT

## 2024-05-07 PROCEDURE — 97112 NEUROMUSCULAR REEDUCATION: CPT

## 2024-05-07 PROCEDURE — 92507 TX SP LANG VOICE COMM INDIV: CPT | Performed by: NURSE PRACTITIONER

## 2024-05-07 PROCEDURE — 97535 SELF CARE MNGMENT TRAINING: CPT

## 2024-05-07 RX ADMIN — PRAMIPEXOLE DIHYDROCHLORIDE 0.25 MG: 0.12 TABLET ORAL at 09:47

## 2024-05-07 RX ADMIN — PRAMIPEXOLE DIHYDROCHLORIDE 0.25 MG: 0.12 TABLET ORAL at 20:25

## 2024-05-07 RX ADMIN — PANTOPRAZOLE SODIUM 40 MG: 40 TABLET, DELAYED RELEASE ORAL at 05:29

## 2024-05-07 RX ADMIN — DEXAMETHASONE 4 MG: 4 TABLET ORAL at 09:48

## 2024-05-07 RX ADMIN — AMLODIPINE BESYLATE 10 MG: 10 TABLET ORAL at 09:47

## 2024-05-07 RX ADMIN — CLOBAZAM 5 MG: 10 TABLET ORAL at 09:48

## 2024-05-07 RX ADMIN — LOSARTAN POTASSIUM 50 MG: 50 TABLET, FILM COATED ORAL at 09:48

## 2024-05-07 RX ADMIN — PRAMIPEXOLE DIHYDROCHLORIDE 0.25 MG: 0.12 TABLET ORAL at 16:24

## 2024-05-07 RX ADMIN — CLOBAZAM 5 MG: 10 TABLET ORAL at 17:59

## 2024-05-07 RX ADMIN — LEVETIRACETAM 2000 MG: 500 TABLET, FILM COATED ORAL at 20:25

## 2024-05-07 RX ADMIN — ENOXAPARIN SODIUM 40 MG: 40 INJECTION SUBCUTANEOUS at 09:46

## 2024-05-07 RX ADMIN — ATORVASTATIN CALCIUM 40 MG: 40 TABLET, FILM COATED ORAL at 17:59

## 2024-05-07 RX ADMIN — LACOSAMIDE 200 MG: 150 TABLET ORAL at 20:25

## 2024-05-07 RX ADMIN — LEVETIRACETAM 2000 MG: 500 TABLET, FILM COATED ORAL at 07:16

## 2024-05-07 RX ADMIN — LACOSAMIDE 200 MG: 150 TABLET ORAL at 09:47

## 2024-05-07 NOTE — TELEPHONE ENCOUNTER
The patient's clinical scenario was discussed extensively with Dr. Contreras from the radiation oncology team.  His case was discussed at the multidisciplinary working group this morning as well.  The initial thought was to pursue concurrent chemoradiation.  Howevert he final decision is to pursue single agent chemotherapy with Taxotere at the usual dose of 75 mg/m² on every 3-week basis.  He also will be on the Avastin on every other week basis x 4 doses for his brain metastasis and vasogenic edema.  We will pursue imaging after couple of months worth of treatment then make a decision regarding sequential radiation.    We Will get in touch with patient and his family to discuss the recommendation and act accordingly.

## 2024-05-07 NOTE — CASE MANAGEMENT
Updated clinical review update sent to [employer] via [facility], awaiting determination.

## 2024-05-07 NOTE — PROGRESS NOTES
PM&R PROGRESS NOTE:  Paras Pitts 68 y.o. male MRN: [id number]  Unit/Bed#: -02 Encounter: [id number]        Rehab Diagnosis: Impairment of mobility, safety, Activities of Daily Living (ADLs), and cognitive/communication skills due to Brain Dysfunction:  02.1  Non-Traumatic  Etiologic Diagnosis: New onset epilepsy/New onset seizure disorder in the setting new right frontal lobe vasogenic edema and radiation necrosis related to metastatic adenocarcinoma right frontal brain mass    HPI: Paras Pitts is a 68 y.o. 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  CT head showing right frontal lobe vasogenic edema with known underlying lesion.  MRI brain showing a right superior frontal mass resection and chemoradiation with unchanged linear enhancement along the surgical cavity compared to 3/26/2024 favoring radiation necrosis.  Patient seen by neurology.  Video EEG showing: Early in recording there was frequent or nearly continuous focal motor seizure and later there were two subclinical right fronto central electrographic seizures.  Patient placed on the following AED regimen: Keppra 2 g twice daily, Vimpat 200 mg twice daily, Onfi 5 mg twice daily.  Of note patient was significantly sedated with Ativan.   Patient to follow-up with epileptologist as an outpatient.  Patient was seen by radiation oncology regarding edema and  radiation necrosis and recommended to increase dexamethasone to 4 mg twice daily.  Patient is under the care of Dr. Langford for medical oncology and is to begin Avastin, chemotherapy radiation therapy for his recurrent mediastinal disease post rehabilitation.  After medical stabilization, patient found to have acute functional deficits from his mobility and self-care, therefore admitted to [facility] for acute inpatient rehabilitation.    SUBJECTIVE: Patient seen face-to-face today in his room.  No acute issues overnight.  No new seizure-like symptoms reported.  Sleeping well and tolerating medications well.  Continues to require moderate-max assist for transfers.    ASSESSMENT: Stable, progressing      PLAN:    Rehabilitation  Functional deficits:  Left hemiparesis, mild left impaired sensation in hand and foot related to previous stroke,, left inattention, impaired balance, impaired mobility, self care   Continue current rehabilitation plan of care to maximize function.    Estimated Discharge: TBD    DVT prophylaxis  Continue subcutaneous Lovenox 40 mg daily     Bladder plan  Continent and voiding     Bowel plan  Continent  Multiple bowel movements today therefore bowel regimen was changed to as needed      * Seizure disorder (HCC)  Assessment & Plan  New onset seizures presenting for 2024  CT head showing right frontal lobe vasogenic edema with known underlying lesion.    MRI brain showing a right superior frontal mass resection and chemoradiation with unchanged linear enhancement along the surgical cavity compared to 3/26/2024 favoring radiation necrosis.    Patient seen by neurology.    Video EEG showing: Early in recording there was frequent or nearly continuous focal motor seizure and later there were two subclinical right fronto central electrographic seizures.    Patient placed on the following AED regimen: Keppra 2 g twice daily, Vimpat 200 mg twice daily, Onfi 5 mg twice daily.  *Of note patient was  significantly sedated with Ativan.  Continue seizure precautions     Patient to follow-up with neurology-epileptologist as an outpatient.    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  Dexamethasone was increased to 4 mg twice daily.    Patient is under the care of Dr. Langford for medical oncology and is to begin Avastin, chemotherapy radiation therapy for his recurrent mediastinal disease post rehabilitation.    Left hemiparesis (HCC)  Assessment & Plan  Worsening left hemiparesis likely related to recent radiation necrosis and vasogenic edema, seizures  Continue Decadron 4 mg twice daily  Continue acute rehabilitation program to maximize function      Brain mass  Assessment & Plan  Patient with known metastatsis to right frontal lobe s/p right frontal craniotomy in March 2023  Residual mild left hemiparesis but remained highly functional and independent.  Patient status post chemotherapy radiation SRT, immunotherapy  Follows with Dr. Langford    Adjustment disorder  Assessment & Plan  May benefit from a neuropsychology consultation if patient is agreeable    Pseudobulbar affect  Assessment & Plan  At times can be tearful and emotionally labile  May benefit from a future SSRI  Will monitor      History of CVA (cerebrovascular accident)  Assessment & Plan  Patient with ischemic CVA in 2011  Resultant mild left hemiparesis    Hypercholesterolemia  Assessment & Plan  Continue Lipitor 40 mg daily (home dose)    Essential hypertension  Assessment & Plan  Continue Norvasc 10 mg daily and losartan 50 mg daily (home  "dose)  Monitor BP and heart rate during rest and with activity  Internal medicine managing    Overweight (BMI 25.0-29.9)  Assessment & Plan  Dietary and lifestyle changes when able        Appreciate IM consultants medical co-management.  Labs, medications, and imaging personally reviewed.      ROS:  A ten point review of systems was completed on 05/07/24 and pertinent positives are listed in subjective section. All other systems reviewed were negative.     OBJECTIVE:   /76 (BP Location: Right arm)   Pulse 71   Temp 97.8 °F (36.6 °C) (Temporal)   Resp 18   Ht 5' 9\" (1.753 m)   Wt 91.6 kg (201 lb 15.1 oz)   SpO2 95%   BMI 29.82 kg/m²     Physical Exam  Vitals and nursing note reviewed.   Constitutional:       General: He is not in acute distress.     Appearance: He is well-developed.   HENT:      Head: Normocephalic.      Nose: Nose normal.   Eyes:      Conjunctiva/sclera: Conjunctivae normal.   Cardiovascular:      Rate and Rhythm: Normal rate.      Pulses: Normal pulses.      Heart sounds: Normal heart sounds.   Pulmonary:      Effort: Pulmonary effort is normal.      Breath sounds: No wheezing.   Abdominal:      General: There is no distension.      Palpations: Abdomen is soft.   Musculoskeletal:         General: No swelling.      Cervical back: Neck supple.   Skin:     General: Skin is warm.   Neurological:      Mental Status: He is alert and oriented to person, place, and time.      Motor: Weakness (left hemiparesis, left inattention) present.   Psychiatric:         Mood and Affect: Mood normal.          Lab Results   Component Value Date    WBC 8.05 05/06/2024    HGB 12.3 05/06/2024    HCT 37.7 05/06/2024    MCV 97 05/06/2024     05/06/2024     Lab Results   Component Value Date    SODIUM 141 05/06/2024    K 3.6 05/06/2024     05/06/2024    CO2 29 05/06/2024    BUN 17 05/06/2024    CREATININE 1.05 05/06/2024    GLUC 151 (H) 05/06/2024    CALCIUM 8.5 05/06/2024     Lab Results "   Component Value Date    INR 0.92 04/20/2024    INR 0.98 08/30/2023    INR 1.06 03/24/2023    PROTIME 12.6 04/20/2024    PROTIME 13.2 08/30/2023    PROTIME 14.0 03/24/2023           Current Facility-Administered Medications:     acetaminophen (TYLENOL) tablet 975 mg, 975 mg, Oral, Q8H PRN, Jenise Dawson PA-C    aluminum-magnesium hydroxide-simethicone (MAALOX) oral suspension 30 mL, 30 mL, Oral, Q6H PRN, Jenise Dawson PA-C    amLODIPine (NORVASC) tablet 10 mg, 10 mg, Oral, Daily, Jenise Dawson PA-C, 10 mg at 05/07/24 0947    atorvastatin (LIPITOR) tablet 40 mg, 40 mg, Oral, After Dinner, Jenise Dawson PA-C, 40 mg at 05/06/24 1745    bisacodyl (DULCOLAX) rectal suppository 10 mg, 10 mg, Rectal, Daily PRN, Jenise Dawson PA-C    cloBAZam (ONFI) tablet 5 mg, 5 mg, Oral, BID, Jenise Dawson PA-C, 5 mg at 05/07/24 0948    dexamethasone (DECADRON) tablet 4 mg, 4 mg, Oral, Daily, Jenise Dawson PA-C, 4 mg at 05/07/24 0948    docusate sodium (COLACE) capsule 100 mg, 100 mg, Oral, BID PRN, Corina Arita MD    enoxaparin (LOVENOX) subcutaneous injection 40 mg, 40 mg, Subcutaneous, Daily, Jenise Dawson PA-C, 40 mg at 05/07/24 0946    lacosamide (VIMPAT) tablet 200 mg, 200 mg, Oral, Q12H LEN, Jenise Dawson PA-C, 200 mg at 05/07/24 0947    levETIRAcetam (KEPPRA) tablet 2,000 mg, 2,000 mg, Oral, Q12H LEN, Jenise Dawson PA-C, 2,000 mg at 05/07/24 0716    losartan (COZAAR) tablet 50 mg, 50 mg, Oral, Daily, Jenise Dawson PA-C, 50 mg at 05/07/24 0948    ondansetron (ZOFRAN-ODT) dispersible tablet 4 mg, 4 mg, Oral, Q6H PRN, Jenise Dawson PA-C    pantoprazole (PROTONIX) EC tablet 40 mg, 40 mg, Oral, Early Morning, Jenise Dawson PA-C, 40 mg at 05/07/24 0529    polyethylene glycol (MIRALAX) packet 17 g, 17 g, Oral, Daily PRN, Corina Arita MD    pramipexole (MIRAPEX) tablet 0.25 mg, 0.25 mg, Oral,  Jenise PULLIAM PA-C, 0.25 mg at 05/07/24 0947    Past Medical History:   Diagnosis Date    Brain compression (HCC) 03/20/2023    Brain mass 03/20/2023    Cancer (HCC) 2001    Receint lung and brain lesions and prostate cancer in 2001    Cerebral edema (HCC) 03/20/2023    Hypertension     Prostate cancer (HCC) 2001    Rectal bleeding     Stroke (HCC)     2011         Patient Active Problem List    Diagnosis Date Noted    Seizure disorder (HCC) 05/02/2024    Metastatic adenocarcinoma (HCC) 2023    Left hemiparesis (HCC) 05/02/2024    Brain mass 03/20/2023    Frontal mass of brain 05/02/2024    Pseudobulbar affect 05/02/2024    Adjustment disorder 05/02/2024    Constipation by delayed colonic transit 04/25/2024    Seizure-like activity (HCC) 04/23/2024    Generalized weakness 04/20/2024    Tremors of nervous system 04/20/2024    History of CVA (cerebrovascular accident) 06/22/2023    Chemotherapy-induced neutropenia (HCC) 05/03/2023    Encounter for central line care 05/01/2023    Carcinoma of right lung (HCC) 04/13/2023    Lung nodule 03/21/2023    Hypercholesterolemia 09/20/2021    Essential hypertension 09/11/2020    Annual physical exam 09/11/2020    Negative depression screening 02/24/2020    Overweight (BMI 25.0-29.9) 02/24/2020          Corina Arita MD    I have spent a total time of 37 minutes on 05/07/24 in caring for this patient including Impressions and Documenting in the medical record.      ** Please Note:  voice to text software may have been used in the creation of this document. Although proof errors in transcription or interpretation are a potential of such software**

## 2024-05-07 NOTE — PROGRESS NOTES
05/07/24 1230   Pain Assessment   Pain Assessment Tool 0-10   Pain Score No Pain   Restrictions/Precautions   Precautions Bed/chair alarms;Fall Risk;Supervision on toilet/commode;Seizure;Cognitive  (port for chemo in R chest wall)   Braces or Orthoses   (DF assist wrap)   Cognition   Overall Cognitive Status WFL   Arousal/Participation Alert;Responsive;Cooperative   Attention Attends with cues to redirect   Orientation Level Oriented X4   Memory Within functional limits   Following Commands Follows one step commands without difficulty   Sit to Lying   Type of Assistance Needed Physical assistance;Verbal cues   Physical Assistance Level 51%-75%   Comment mod-max A mat table   Sit to Lying CARE Score 2   Lying to Sitting on Side of Bed   Type of Assistance Needed Physical assistance;Incidental touching   Physical Assistance Level 26%-50%   Comment mod A mat table   Lying to Sitting on Side of Bed CARE Score 3   Sit to Stand   Type of Assistance Needed Physical assistance;Verbal cues   Physical Assistance Level 26%-50%   Comment mod A STS with PFRW; assist with LUE for placement in arm trough   Sit to Stand CARE Score 3   Bed-Chair Transfer   Type of Assistance Needed Physical assistance;Verbal cues   Physical Assistance Level 26%-50%   Comment min-mod A sit pivot transfer wheelchair<>mat table   Chair/Bed-to-Chair Transfer CARE Score 3   Toilet Transfer   Type of Assistance Needed Physical assistance;Verbal cues   Physical Assistance Level 51%-75%   Comment mod-max A using grab bar   Toilet Transfer CARE Score 2   Therapeutic Interventions   Strengthening supine ther ex AROM RLE; AAROM LLE   Balance gait and transfer traing   Neuromuscular Re-Education ice massage to LLE with minimal contractions; bridging and single/double LE LTR focusin on increased weight bearing through LLE and improved LLE control   Other wheelchair mobility   Assessment   Treatment Assessment Pt agreeable to therapy session.   No pain  reported.   Cues for general safety as well as task sequence.   Increased fatigue noted today, requiring frequent rest breaks and increased time to complete all tasks.   Trialed ice massage to LLE with minimal contractions noted.   Completed ther ex for general LE strengthening, NMR for improved LLE control; gait and transfer training focusing on sequence and technique for improved balance and safety with functional mobility.   Propels wheelchair with MIN A level and unlevel surfaces.   PT Barriers   Physical Impairment Decreased strength;Decreased range of motion;Decreased endurance;Impaired balance;Decreased mobility;Decreased safety awareness;Impaired tone;Decreased coordination   Functional Limitation Wheelchair management;Walking;Transfers;Standing;Stair negotiation;Ramp negotiation;Car transfers   Plan   Treatment/Interventions Functional transfer training;LE strengthening/ROM;Therapeutic exercise;Bed mobility;Gait training  (wheelchair mobility, NMR)   Progress Progressing toward goals   PT Therapy Minutes   PT Time In 1230   PT Time Out 1400   PT Total Time (minutes) 90   PT Mode of treatment - Individual (minutes) 90   PT Mode of treatment - Concurrent (minutes) 0   PT Mode of treatment - Group (minutes) 0   PT Mode of treatment - Co-treat (minutes) 0   PT Mode of Treatment - Total time(minutes) 90 minutes   PT Cumulative Minutes 452   Therapy Time missed   Time missed? No

## 2024-05-07 NOTE — NURSING NOTE
Left sided weakness. Min to mod assist with transfers.  Tolerating therapy.  Continue same plan of care.  No change from previous assessment.

## 2024-05-07 NOTE — PROGRESS NOTES
05/07/24 1230   Pain Assessment   Pain Assessment Tool 0-10   Pain Score No Pain   Restrictions/Precautions   Precautions Bed/chair alarms;Fall Risk;Supervision on toilet/commode;Seizure;Cognitive  (port for chemo in R chest wall)   Braces or Orthoses   (DF assist wrap)   Cognition   Overall Cognitive Status WFL   Arousal/Participation Alert;Responsive;Cooperative   Attention Attends with cues to redirect   Orientation Level Oriented X4   Memory Within functional limits   Following Commands Follows one step commands without difficulty   Sit to Lying   Type of Assistance Needed Physical assistance;Verbal cues   Physical Assistance Level 51%-75%   Comment mod-max A mat table   Sit to Lying CARE Score 2   Lying to Sitting on Side of Bed   Type of Assistance Needed Physical assistance;Incidental touching   Physical Assistance Level 26%-50%   Comment mod A mat table   Lying to Sitting on Side of Bed CARE Score 3   Sit to Stand   Type of Assistance Needed Physical assistance;Verbal cues   Physical Assistance Level 26%-50%   Comment mod A STS with PFRW; assist with LUE for placement in arm trough   Sit to Stand CARE Score 3   Bed-Chair Transfer   Type of Assistance Needed Physical assistance;Verbal cues   Physical Assistance Level 26%-50%   Comment min-mod A sit pivot transfer wheelchair<>mat table   Chair/Bed-to-Chair Transfer CARE Score 3   Walk 10 Feet   Type of Assistance Needed Physical assistance;Verbal cues   Physical Assistance Level Total assistance   Comment mod A level surfaces with PFRW; second person for wheelchair follow   Walk 10 Feet CARE Score 1   Ambulation   Primary Mode of Locomotion Prior to Admission Walk   Distance Walked (feet) 16 ft  (9' 16')   Assist Device Platform;Roller Walker   Gait Pattern Inconsistant [place];Slow [place];Decreased foot clearance;L foot drag;Forward Flexion;L hemiparesis;Narrow ABHINAV;Step to;Decreased L stance;Improper weight shift   Limitations Noted In  Balance;Coordination;Device Management;Endurance;Posture;Safety;Speed;Strength   Provided Assistance with: Balance;Trunk Support;Weight Shift   Walk Assist Level Moderate Assist;Chair Follow   Findings mod A level surfaces with PFRW; second person for wheelchair follow   Does the patient walk? 2. Yes   Wheel 50 Feet with Two Turns   Type of Assistance Needed Physical assistance;Verbal cues   Physical Assistance Level 26%-50%   Comment min A level and unlevel surfaces   Wheel 50 Feet with Two Turns CARE Score 3   Wheel 150 Feet   Type of Assistance Needed Physical assistance;Verbal cues   Physical Assistance Level 26%-50%   Comment min A level and unlevel surfaces   Wheel 150 Feet CARE Score 3   Wheelchair mobility   Does the patient use a wheelchair? 1. Yes   Type of Wheelchair Used 1. Manual   Method Right upper extremity;Right lower extremity   Assistance Provided For Locking Brakes;Remove armrests;Replace armrests;Remove Leg Rest;Replace Leg Rest   Distance Level Surface (feet) 197 ft  (197' 134')   Distance Wheeled 3% Grade 12 ft   Findings min A level and unlevel surfaces   Toilet Transfer   Type of Assistance Needed Physical assistance;Verbal cues   Physical Assistance Level 51%-75%   Comment mod-max A using grab bar   Toilet Transfer CARE Score 2   Therapeutic Interventions   Strengthening supine ther ex AROM RLE; AAROM LLE   Balance gait and transfer traing   Neuromuscular Re-Education ice massage to LLE with minimal contractions; bridging and single/double LE LTR focusin on increased weight bearing through LLE and improved LLE control   Other wheelchair mobility   Assessment   Treatment Assessment Pt agreeable to therapy session.   No pain reported.   Cues for general safety as well as task sequence.   Increased fatigue noted today, requiring frequent rest breaks and increased time to complete all tasks.   Trialed ice massage to LLE with minimal contractions noted.   Completed ther ex for general LE  strengthening, NMR for improved LLE control; gait and transfer training focusing on sequence and technique for improved balance and safety with functional mobility.   Propels wheelchair with MIN A level and unlevel surfaces.   PT Barriers   Physical Impairment Decreased strength;Decreased range of motion;Decreased endurance;Impaired balance;Decreased mobility;Decreased safety awareness;Impaired tone;Decreased coordination   Functional Limitation Wheelchair management;Walking;Transfers;Standing;Stair negotiation;Ramp negotiation;Car transfers   Plan   Treatment/Interventions Functional transfer training;LE strengthening/ROM;Therapeutic exercise;Bed mobility;Gait training  (wheelchair mobility, NMR)   Progress Progressing toward goals   PT Therapy Minutes   PT Time In 1230   PT Time Out 1400   PT Total Time (minutes) 90   PT Mode of treatment - Individual (minutes) 90   PT Mode of treatment - Concurrent (minutes) 0   PT Mode of treatment - Group (minutes) 0   PT Mode of treatment - Co-treat (minutes) 0   PT Mode of Treatment - Total time(minutes) 90 minutes   PT Cumulative Minutes 452   Therapy Time missed   Time missed? No

## 2024-05-07 NOTE — PROGRESS NOTES
05/07/24 0700   Pain Assessment   Pain Assessment Tool 0-10   Pain Score No Pain   Restrictions/Precautions   Precautions Bed/chair alarms;Cognitive;Fall Risk;Multiple lines;Supervision on toilet/commode;Seizure  (port upper R chest wall)   Weight Bearing Restrictions No   ROM Restrictions No   Eating   Type of Assistance Needed Set-up / clean-up   Physical Assistance Level No physical assistance   Eating CARE Score 5   Eating Assessment   Food To Mouth Yes   Able To Cut Yes   Meal Assessed Breakfast   Opens Packages No   Oral Hygiene   Type of Assistance Needed Set-up / clean-up   Physical Assistance Level No physical assistance   Comment seated at sink in w/c   Oral Hygiene CARE Score 5   Grooming   Able To Acquire Items;Comb/Brush Hair;Wash/Dry Face;Brush/Clean Teeth;Wash/Dry Hands   Limitation Noted In Coordination;Problem Solving;Safety;Strength   Findings seated at sink in w/c   Shower/Bathe Self   Type of Assistance Needed Verbal cues;Physical assistance   Physical Assistance Level Total assistance   Comment Pt required assist x2, OT provided assist for maintaining pt balance while standing to wash/dry buttocks/perineal area and OTS provided assist to wash/dry buttocks and verbal cues for pt to wash/dry perineal area. Pt used CHG wipes and washed face while seated in w/c with supervision level assist.   Shower/Bathe Self CARE Score 1   Bathing   Assessed Bath Style Sponge Bath  (CHG wipes)   Anticipated D/C Bath Style Shower;Sponge Bath   Able to Gather/Transport No   Able to Wash/Rinse/Dry (body part) Left Arm;Right Arm;L Upper Leg;R Upper Leg;L Lower Leg/Foot;R Lower Leg/Foot;Chest;Abdomen;Perineal Area   Limitations Noted in Balance;Coordination;Endurance;ROM;Safety;Problem Solving;Strength   Positioning Seated;Standing   Findings  pt demonstrated compensatory strategy during UB bathing ; pt able to hold CHG wipe in place in L hand and moves RUE against wipe to compensate for LUE weakness.   Tub/Shower  "Transfer   Reason Not Assessed Sponge Bath   Upper Body Dressing   Type of Assistance Needed Physical assistance;Verbal cues   Physical Assistance Level 51%-75%   Comment verbal and visual cues for compensatory technique and problem solving, overall modA to aid to thread bilateral sleeves and to rotate shirt d/t incorrect initial donning.   Upper Body Dressing CARE Score 2   Lower Body Dressing   Type of Assistance Needed Physical assistance   Physical Assistance Level Total assistance   Comment asist x2; OT pulled undergarment down over buttocks to bathe buttocks/perineal area and then pulled unergarment and pants up over buttocks and L hip. OTS maintained balance while OT don/doff LB clothing, then pulled up pants over R hip.   Lower Body Dressing CARE Score 1   Putting On/Taking Off Footwear   Type of Assistance Needed Verbal cues;Supervision;Adaptive equipment   Physical Assistance Level No physical assistance   Comment Pt encouraged to use sock aide. while donning socks EOB. Pt reports use of sock aid \"went better this time than it did last time\". OTS and pt worked to find compensatory methods.   Putting On/Taking Off Footwear CARE Score 4   Picking Up Object   Reason if not Attempted Safety concerns   Picking Up Object CARE Score 88   Dressing/Undressing Clothing   Remove UB Clothes Pullover Shirt   Don UB Clothes Pullover Shirt   Don LB Clothes Pants;Undergarment;Socks   Limitations Noted In Balance;Coordination;Endurance;Problem Solving;Safety;Strength;ROM   Adaptive Equipment Sock Aide   Positioning Supported Sit;Standing;Sit Edge Of Bed;In Bed  (Pt recieved in bed attempting to don undergarment. Pt reports he did the same yesterday.)   Roll Left and Right   Type of Assistance Needed Physical assistance   Physical Assistance Level 26%-50%   Comment min-modA to roll onto R side.   Roll Left and Right CARE Score 3   Lying to Sitting on Side of Bed   Type of Assistance Needed Physical assistance   Physical " Assistance Level 26%-50%   Comment OTS assisted to pull legs from bed. Pt able to engage core muscles and use R arm to begin raising UB to sitting position. OTS provided min-modA to help pt complete transfer.   Lying to Sitting on Side of Bed CARE Score 3   Sit to Stand   Type of Assistance Needed Physical assistance;Verbal cues   Physical Assistance Level 26%-50%   Comment min-modA with PFRW; verbal cues for hand placement   Sit to Stand CARE Score 3   Bed-Chair Transfer   Type of Assistance Needed Physical assistance   Physical Assistance Level 51%-75%   Comment mod-maxA provided for stand pivot transfer from EOB to w/c.   Chair/Bed-to-Chair Transfer CARE Score 2   Transfer Bed/Chair/Wheelchair   Limitations Noted In Balance;Coordination;Endurance;Problem Solving;UE Strength;LE Strength   Adaptive Equipment Roller Walker  (PFRW)   Toileting Hygiene   Type of Assistance Needed Set-up / clean-up   Physical Assistance Level No physical assistance   Comment urinal emptied by staff   Toileting Hygiene CARE Score 5   Neuromuscular Education   Trunk Control pt sat EOB for 15 minutes unsupported while being educated on sock aide and demonstratig how to use sock aide.   Cognition   Overall Cognitive Status Impaired   Arousal/Participation Alert;Responsive;Cooperative   Attention Difficulty attending to directions   Orientation Level Oriented X4   Memory Within functional limits   Following Commands Follows one step commands inconsistently   Comments pt unable to consistently follow one step directions less than 50% of the time during ADL session; required visual cues for better understanding   Activity Tolerance   Activity Tolerance Patient tolerated treatment well   Assessment   Treatment Assessment Pt agreeable to skilled OT session this AM, focusing on ADL training and AE management; details listed in respective sections. Pt recieved in bed attempting to don undergarment after using portable urinal. Pt required  increased verbal cues and visual cues today for compensatory UB/LB dressing strategies. Pt demonstrated decreased attention and comprehension throughout session when compared to previous sessions. Continued education on using sock aide. Pt reports sitting EOB to complete dressing tasks at home; as simulated during session. Pt donned socks without sock aid while in supported seat. Pt demonstrated improved trunk control by sitting EOB for 15 minutes during session today. Pt continues with barriers of decreased strength throughout but especially L side s/p previous CVA, decreased balance, impaired coordination L side, impaired safety awareness, problem solving, and attention, and decreased activity tolerance; all affect independence in self care and fxl transfers. Pt would benefit from continued skilled OT services in order to address listed barriers and prepare for safe d/c.   Prognosis Good   Problem List Decreased strength;Decreased range of motion;Decreased endurance;Impaired balance;Decreased coordination;Decreased cognition;Decreased safety awareness   Plan   Treatment/Interventions ADL retraining;Functional transfer training;Therapeutic exercise;Endurance training;Cognitive reorientation;Patient/family training;Equipment eval/education;Bed mobility;Compensatory technique education;Spoke to nursing;OT   Progress Progressing toward goals   OT Therapy Minutes   OT Time In 0700   OT Time Out 0830   OT Total Time (minutes) 90   OT Mode of treatment - Individual (minutes) 90   Therapy Time missed   Time missed? No

## 2024-05-07 NOTE — NURSING NOTE
Patient resting in bed at this time.  No signs of distress noted.  Patient requires two assist to stand pivot transfer overnight.  Left sided weakness continues.  Bed alarm in plan to promote patient safety.  Patient refused SCDs as ordered for DVT prevention.  Call bell within reach.  Plan of care ongoing.

## 2024-05-07 NOTE — PCC PHYSICAL THERAPY
05/08/2024:   Patient motivated and participating in therapy.   Patient MOD-MAX A bed mobility, MOD A STS with PFRW, MOD A sit pivot surface to surfaces using no AD, MOD-MAX A SPT using grab bar(in bathroom), MOD A ambulation up to 33' level surfaces with PFRW and wheelchair follow, MIN A wheelchair mobility 197' level and unlevel  surfaces.  Steps not assessed.   Patient limited by decreased ROM/strength, decreased balance and safety, decreased endurance, impaired LE tone.  Patient may benefit from continued inpatient [facility] PT to increase function, safety, and improved independence in prep for safe d/c to home with family and continued PT services as needed.    05/15/2024:   Patient participating in therapy and making slow, but positive gains.   Patient MOD A bed mobility, MIN-MOD A sit pivot transfers surface to surface, MOD A STS with PFRW, S wheelchair mobility level surfaces, MOD A x1 ambulation up to 56' with PFRW with second person for wheelchair follow, mod-max A x 1 plus min A of 1 negotiation of 3 steps with 2 handrails going up forward and down backwards.   Patient limited by decreased ROM/strength, decreased balance and safety, decreased endurance, impaired LE tone.  Patient may benefit from continued inpatient [facility] PT to increase function, safety, and improved independence in prep for safe d/c to home with family and continued PT services as needed.    05/22/2024:   Patient participating in therapy and making slow, yet positive gains.  Patient MOD-occ MAX A bed mobility, MIN-MOD A STS transfers with PFRW, MIN-MOD A sit pivot transfers surface to surface, MOD-MAX A SPT with PFRW, S/MOD I wheelchair mobility up to 201' level and unlevel surfaces, MIN-MOD A ambulation up to 48' level and unlevel surfaces with PFRW and L off shelf AFO.  Remains limited by decreased ROM/strength, decreased balance and safety, decreased endurance, impaired, LE tone, and impaired cognition.  Patient may benefit from continued  inpatient [facility] PT to complete family training for increased function, safety, and improved independence in prep for safe d/c to home with family and continued PT services as needed.

## 2024-05-07 NOTE — PCC OCCUPATIONAL THERAPY
5/7/24: Pt's current LOF listed above. Pt's barriers include : decreased balance, endurance, generalized strength, ROM, coordination, cognition (attention, comprehension, problem solving), and safety awareness. Pt currently participating in TE, TA, ADL training, fxl transfers/mobility, cognitive training, NMR, and activity tolerance in order to progress towards goals. Pt would benefit from continued skilled OT to address barriers and prepare for safe d/c.    5/15/24: Pt's current LOF listed above. Pt's barriers include : decreased balance, endurance, generalized strength, ROM, coordination, cognition (attention, comprehension, problem solving), and safety awareness. Pt currently participating in TE, TA, ADL training, fxl transfers/mobility, cognitive training, NMR, and activity tolerance in order to progress towards goals. Pt would benefit from continued skilled OT to address barriers and prepare for safe d/c.    5/22/24: Pt's current LOF listed above. Pt's barriers include : decreased balance, endurance, generalized strength, ROM, coordination, cognition (attention, comprehension, problem solving), and safety awareness. Pt currently participating in TE, TA, ADL training, fxl transfers/mobility, cognitive training, NMR, and activity tolerance in order to progress towards goals. Pt would benefit from continued skilled OT to address barriers and prepare for safe d/c.

## 2024-05-07 NOTE — PROGRESS NOTES
"Progress Note - Paras Pitts 68 y.o. male MRN: [id number]    Unit/Bed#: [facility] 213-02 Encounter: [id number]        Subjective:   Patient seen and examined at bedside after reviewing the chart and discussing the case with the caring staff.      Patient examined at bedside.  Patient has no acute symptoms. He has been feeling tired on medications but keeping busy helps him stay awake.       Physical Exam   Vitals: Blood pressure 124/76, pulse 71, temperature 97.8 °F (36.6 °C), temperature source Temporal, resp. rate 18, height 5' 9\" (1.753 m), weight 91.6 kg (201 lb 15.1 oz), SpO2 95%.,Body mass index is 29.82 kg/m².  Constitutional: Patient in no acute distress.  HEENT: PERR, EOMI, MMM.  Cardiovascular: Normal rate and regular rhythm.    Pulmonary/Chest: Effort normal and breath sounds normal.   Abdomen: Soft, + BS, NT.    Assessment/Plan:  Paras Pitts is a(n) 68 y.o. male with tremors of      Cardiac Hx w/ HTN, HLD, hx of CVA. Continue amlodipine 10 mg daily, Losartan 50 mg daily, atorvastatin 40 mg daily.  Type 2 diabetes mellitus.  Hgb A1c 6.7% on 5/3/24.  Carb controlled diet.  I may consider starting the patient on Metformin or Januvia.  GERD. Continue Protonix 40 mg daily.  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 Stage Nicholas. S/p robotic converted to right thoracotomy and right upper lobectomy on 9/1/2023. S/p bronchoscopy with EBUS at [facility] on 4/16/2024. Continue Decadron 4 mg daily. Patient completed CT_SIM at [facility] 4/29. Further radiation treatment in 1-2 weeks per oncologist, Dr. Contreras.  Pain and bowel regimen. As per physiatry.  Tremors of the nervous system. Continue Keppra twice daily Vimpat 100 mg twice daily, clobazam 0.5 mg twice daily. Follow up in 4 weeks with epilepsy attending. Patient receiving intensive PT OT as per physiatry.     Anticipated date of discharge.  TBD.  "

## 2024-05-07 NOTE — PROGRESS NOTES
05/07/24 1400   Pain Assessment   Pain Assessment Tool 0-10   Pain Score No Pain   Speech/Language/Cognition Assessmetn   Treatment Assessment Deductive reasoning puzzle schedule planning with 1 error fixed with min cue. Verbally alternate naming 2 different categories with max cue initially decreased to independent added a third category and completed dasia. Sentence completion @ 75% dasia.   SLP Therapy Minutes   SLP Time In 1400   SLP Time Out 1430   SLP Total Time (minutes) 30   SLP Mode of treatment - Individual (minutes) 30   SLP Mode of treatment - Concurrent (minutes) 0   SLP Mode of treatment - Group (minutes) 0   SLP Mode of treatment - Co-treat (minutes) 0   SLP Mode of Treatment - Total time(minutes) 30 minutes   SLP Cumulative Minutes 300

## 2024-05-07 NOTE — PCC NURSING
5/6/24 Patient is a recent admission to [facility] with seizure disorder.  Patient was still actively receiving cancer treatments prior to hospitalization.  Patient remains alert and oriented. Left sided weakness continues.  Trace lower extremity edema.  Patient remains continent of bowel and bladder.  Skin remains intact with just scattered bruising noted.  Patient requires two assist for transfers.    5/13/24  New onset tremors LUE, LLE thought to be partial seizures improved with vimpat, keppra, and onfi. Pt with lung cancer with mets to the brain and mediastinal lymph nodes. S/p brain resection of the tumor 3/23. Right chest wall port unaccessed: still receiving chemo and radiation prior to hospitalization-plan to restart after rehab. Hx: Stroke, HTN, Cancer recent lung and brain lesions, prostate CA. Left upper extremity weak and LLE very weak. SPT x2 for transfers. A&Ox4, HRR, lungs diminished with scattered crackles and wheezing. Negative for Flu/Covid. Pending CXR on 5/14/24 to rule out pneumonia. Edema-b/l le, LLE>RLE TEDS. Fell from chair on 5/11/24. Chemo infusion scheduled for Wednesday 5/15-infusion will set up.      5/20/24 New onset tremors LUE, LLE thought to be partial seizures improved with vimpat, keppra, and onfi. Pt with lung cancer with mets to the brain and mediastinal lymph nodes. S/p brain resection of the tumor 3/23. Right chest wall port unaccessed: still receiving chemo and radiation prior to hospitalization-plan to restart after rehab. Hx: Stroke, HTN, Cancer recent lung and brain lesions, prostate CA. Left upper extremity weak and LLE very weak. SPT x2 for transfers. A&Ox4, HRR, lungs diminished with scattered crackles and wheezing. Negative for Flu/Covid. Pending CXR on 5/14/24 to rule out pneumonia. Edema-b/l le, LLE>RLE TEDS. Fell from chair on 5/11/24. Chemo infusion done on 5/15.

## 2024-05-08 ENCOUNTER — TELEPHONE (OUTPATIENT)
Dept: HEMATOLOGY ONCOLOGY | Facility: CLINIC | Age: 69
End: 2024-05-08

## 2024-05-08 DIAGNOSIS — T45.1X5A CHEMOTHERAPY-INDUCED NEUTROPENIA (HCC): ICD-10-CM

## 2024-05-08 DIAGNOSIS — C79.9 METASTATIC ADENOCARCINOMA (HCC): Primary | ICD-10-CM

## 2024-05-08 DIAGNOSIS — C34.91 CARCINOMA OF RIGHT LUNG (HCC): ICD-10-CM

## 2024-05-08 DIAGNOSIS — D70.1 CHEMOTHERAPY-INDUCED NEUTROPENIA (HCC): ICD-10-CM

## 2024-05-08 DIAGNOSIS — Z51.11 ENCOUNTER FOR CHEMOTHERAPY MANAGEMENT: Primary | ICD-10-CM

## 2024-05-08 PROBLEM — J02.9 SORE THROAT: Status: ACTIVE | Noted: 2024-05-08

## 2024-05-08 PROCEDURE — 97116 GAIT TRAINING THERAPY: CPT

## 2024-05-08 PROCEDURE — 92507 TX SP LANG VOICE COMM INDIV: CPT | Performed by: NURSE PRACTITIONER

## 2024-05-08 PROCEDURE — 97530 THERAPEUTIC ACTIVITIES: CPT

## 2024-05-08 PROCEDURE — 99233 SBSQ HOSP IP/OBS HIGH 50: CPT | Performed by: PHYSICAL MEDICINE & REHABILITATION

## 2024-05-08 PROCEDURE — 97110 THERAPEUTIC EXERCISES: CPT

## 2024-05-08 PROCEDURE — 97112 NEUROMUSCULAR REEDUCATION: CPT

## 2024-05-08 RX ORDER — GUAIFENESIN/DEXTROMETHORPHAN 100-10MG/5
10 SYRUP ORAL EVERY 4 HOURS PRN
Status: DISCONTINUED | OUTPATIENT
Start: 2024-05-08 | End: 2024-05-24 | Stop reason: HOSPADM

## 2024-05-08 RX ORDER — DEXAMETHASONE 4 MG/1
8 TABLET ORAL 2 TIMES DAILY WITH MEALS
Qty: 12 TABLET | Refills: 6 | Status: SHIPPED | OUTPATIENT
Start: 2024-05-08

## 2024-05-08 RX ORDER — ENOXAPARIN SODIUM 100 MG/ML
40 INJECTION SUBCUTANEOUS DAILY
Status: DISCONTINUED | OUTPATIENT
Start: 2024-05-09 | End: 2024-05-24 | Stop reason: HOSPADM

## 2024-05-08 RX ADMIN — LACOSAMIDE 200 MG: 150 TABLET ORAL at 20:58

## 2024-05-08 RX ADMIN — ATORVASTATIN CALCIUM 40 MG: 40 TABLET, FILM COATED ORAL at 17:01

## 2024-05-08 RX ADMIN — CLOBAZAM 5 MG: 10 TABLET ORAL at 08:21

## 2024-05-08 RX ADMIN — LACOSAMIDE 200 MG: 150 TABLET ORAL at 08:19

## 2024-05-08 RX ADMIN — PRAMIPEXOLE DIHYDROCHLORIDE 0.25 MG: 0.12 TABLET ORAL at 20:58

## 2024-05-08 RX ADMIN — CLOBAZAM 5 MG: 10 TABLET ORAL at 17:01

## 2024-05-08 RX ADMIN — LEVETIRACETAM 2000 MG: 500 TABLET, FILM COATED ORAL at 20:58

## 2024-05-08 RX ADMIN — AMLODIPINE BESYLATE 10 MG: 10 TABLET ORAL at 08:22

## 2024-05-08 RX ADMIN — PRAMIPEXOLE DIHYDROCHLORIDE 0.25 MG: 0.12 TABLET ORAL at 08:22

## 2024-05-08 RX ADMIN — LOSARTAN POTASSIUM 50 MG: 50 TABLET, FILM COATED ORAL at 08:22

## 2024-05-08 RX ADMIN — PRAMIPEXOLE DIHYDROCHLORIDE 0.25 MG: 0.12 TABLET ORAL at 17:01

## 2024-05-08 RX ADMIN — LEVETIRACETAM 2000 MG: 500 TABLET, FILM COATED ORAL at 08:20

## 2024-05-08 RX ADMIN — PANTOPRAZOLE SODIUM 40 MG: 40 TABLET, DELAYED RELEASE ORAL at 05:46

## 2024-05-08 RX ADMIN — ENOXAPARIN SODIUM 40 MG: 40 INJECTION SUBCUTANEOUS at 08:23

## 2024-05-08 RX ADMIN — DEXAMETHASONE 4 MG: 4 TABLET ORAL at 08:22

## 2024-05-08 NOTE — CASE MANAGEMENT
CM called patient's wife Mary Collette, to give update . CM inquired if Mary Collette will be able to stay with patient for few weeks , when he is discharged home. Mary Collette stated she is arranging her work schedule so she will be able to do that. She stated she is cleaning out his studio, and converting the kitchen to a bedroom, and she will be staying there with him.She stated she was just going to call patient's Oncologist regarding starting his chemo infusion while inpatient at the [facility]. CM relayed team meeting information, with potential d/c date of 5/22, re teaming him each week, watching his progress , to confirm or change the discharge date. CM made her aware an insurance review will be sent on 5/15, with clinical information, which will also help to set definite date.   CM met with patient, relayed team meeting information with him, including d/c date of 5/22, with similar explanation regarding insurance coverage and team meetings. Patient also commented on the possibility of starting his infusions while here at the [facility], more information to follow. CM relayed conversation with  wife, remodeling the kitchen into the bedroom. Patient pleasant, motivated and excited for the progress he is making here in [facility].CM will continue to follow.

## 2024-05-08 NOTE — PROGRESS NOTES
"Progress Note - Paras Pitts 68 y.o. male MRN: [id number]    Unit/Bed#: [facility] 213-02 Encounter: [id number]        Subjective:   Patient seen and examined at bedside after reviewing the chart and discussing the case with the caring staff.      Patient examined at bedside.  Patient has no acute symptoms.     Physical Exam   Vitals: Blood pressure 146/74, pulse 77, temperature 98.9 °F (37.2 °C), temperature source Temporal, resp. rate 16, height 5' 9\" (1.753 m), weight 91.6 kg (201 lb 15.1 oz), SpO2 95%.,Body mass index is 29.82 kg/m².  Constitutional: Patient in no acute distress.  HEENT: PERR, EOMI, MMM.  Cardiovascular: Normal rate and regular rhythm.    Pulmonary/Chest: Effort normal and breath sounds normal.   Abdomen: Soft, + BS, NT.    Assessment/Plan:  Paras Pitts is a(n) 68 y.o. male with tremors of the nervous system.     Cardiac Hx w/ HTN, HLD, hx of CVA.  Continue amlodipine 10 mg daily, Losartan 50 mg daily, atorvastatin 40 mg daily.  Type 2 diabetes mellitus.  Hgb A1c 6.7% on 5/3/24.  Carb controlled diet.   GERD.  Continue Protonix 40 mg daily.  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  Stage Nicholas.  S/p robotic converted to right thoracotomy and right upper lobectomy on 9/1/2023. S/p bronchoscopy with EBUS at [facility] on 4/16/2024. Continue Decadron 4 mg daily.  Patient completed CT-SIM at . [facility] 4/29.  Further radiation treatment in 1-2 weeks per oncologist, Dr. Contreras.  Pain and bowel regimen. As per physiatry.  Tremors of the nervous system.  Continue Keppra twice daily, Vimpat 100 mg twice daily, clobazam 0.5 mg twice daily.  Follow up in 4 weeks with epilepsy attending.  Patient receiving intensive PT OT as per physiatry.     Anticipated date of discharge.  Wednesday, 5/22/2024    The patient was discussed with Dr. Andrade and he is in agreement with the above note.  "

## 2024-05-08 NOTE — ASSESSMENT & PLAN NOTE
Improved; Covid/RSV/flu negative 5/13   PRN Robitussin ordered, lozenges, magic mouthwash  See section for cough

## 2024-05-08 NOTE — PCC SPEECH THERAPY
5/8   Comprehension   Assist Devices Glasses   Comprehension (FIM) 5 - Needs help/cues, repetition only RARELY ( < 10% of the time)   Expression   Expression (FIM) 5 - Needs help/cues only RARELY (< 10% of the time)   Social Interaction   Social Interaction (FIM) 5 - Interacts appropriately with others 90% of time   Problem Solving   Problem solving (FIM) 5 - Solves basic problems 90% of time   Memory   Memory (FIM) 5 - Recalls/performs request 90% of time   Sensation   Additional Comments Pt reports numbless L side ( baseline)       Cognitive Linguisitic Assessments   The Repeatable Battery for the Assessment of Neuropsychological Status (RBANS)  The Repeatable Battery for the Assessment of Neuropsychological Status (RBANS) is a brief, individually-administered assessment which measures attention, language, visuospatial/constructional abilities, and immediate & delayed memory. The RBANS is intended for use with adolescents to adults, ages 12 to 89 years. The following results were obtained during the administration of the assessment.     Form: A  “IE” indicates the scores from the initial evaluation (4/4/23). Form: A     Cognitive Domain/Subtest: Index Score: Percentile Rank: Classification: IE Index Score: Status:   IMMEDIATE MEMORY 81 10%ile Low Average 103 DECLINE        1. List Learning (21/40)            2. Story Memory (12/24)               VISUOSPATIAL/  CONSTRUCTIONAL 102 55%ile Average 109 NO CHANGE        3. Figure Copy (20/20)            4. Line Orientation (13/20)               LANGUAGE 82 12%ile Low Average 85 NO CHANGE        5. Picture Naming (10/10)            6. Semantic Fluency (8/40)               ATTENTION 82 12%ile Low Average 91 DECLINE        7. Digit Span (8/16)            8. Coding (28/89)               DELAYED MEMORY 101 53%ile Average 112 DECLINE        9. List Recall (5/10)            10. List Recognition (19/20)            11. Story Recall (7/12)            12. Figure Recall (13/20)                 Sum of Index Scores:  448   500 DECLINE   Total Score:  85         Percentile: 16%ile         Classification: Low Average                           5/15   UPDATED FIM LEVELS   Comprehension   Assist Devices Glasses   Comprehension (FIM) 6 - Has only MILD difficulty with complex/abstract info   Expression   Expression (FIM) 6 - Expresses complex/abstract but requires:  more time   Social Interaction   Social Interaction (FIM) 6 - Interacts appropriately with others BUT requires extra  time   Problem Solving   Problem solving (FIM) 5 - Solves basic problems 90% of time   Memory   Memory (FIM) 5 - Recalls/performs request 90% of time       5/21   UPDATED FIM LEVELS   Comprehension   Comprehension (FIM) 6 - Has only MILD difficulty with complex/abstract info   Expression   Expression (FIM) 6 - Has only MILD difficulty with complex/abstract info   Social Interaction   Social Interaction (FIM) 6 - Interacts appropriately with others BUT requires extra  time   Problem Solving   Problem solving (FIM) 6 - Solves complex problems BUT requires extra time   Memory   Memory (FIM) 6 - Recognizes with extra time     LTG - comprehension, expression, problem solving and memory ( mod I )

## 2024-05-08 NOTE — PROGRESS NOTES
05/08/24 1230   Pain Assessment   Pain Assessment Tool 0-10   Pain Score No Pain   Restrictions/Precautions   Precautions Bed/chair alarms;Cognitive;Fall Risk;Supervision on toilet/commode   Comprehension   Comprehension (FIM) 5 - Needs help/cues, repetition only RARELY ( < 10% of the time)   Expression   Expression (FIM) 5 - Needs help/cues only RARELY (< 10% of the time)   Social Interaction   Social Interaction (FIM) 5 - Interacts appropriately with others 90% of time   Problem Solving   Problem solving (FIM) 5 - Solves basic problems 90% of time   Memory   Memory (FIM) 5 - Needs cueing reminders <10%   Speech/Language/Cognition Assessmetn   Treatment Assessment provided clues with 2 attributes and guessed a word @ 100% dasia. Provided 3 words and repeated back in sequential order occasional min cue. Word finding x2 recalled dasia in session with time delay.Inconsistent slightly delayed processing time.   SLP Therapy Minutes   SLP Time In 1230   SLP Time Out 1300   SLP Total Time (minutes) 30   SLP Mode of treatment - Individual (minutes) 30   SLP Mode of treatment - Concurrent (minutes) 0   SLP Mode of treatment - Group (minutes) 0   SLP Mode of treatment - Co-treat (minutes) 0   SLP Mode of Treatment - Total time(minutes) 30 minutes   SLP Cumulative Minutes 330

## 2024-05-08 NOTE — PROGRESS NOTES
"   05/08/24 0858   Pain Assessment   Pain Assessment Tool 0-10   Pain Score No Pain   Restrictions/Precautions   Precautions Bed/chair alarms;Fall Risk;Cognitive  (L hemiplegia, port for chemo in R chest wall.)   Cognition   Overall Cognitive Status WFL   Arousal/Participation Alert;Responsive   Attention Attends with cues to redirect   Orientation Level Oriented X4   Memory Within functional limits   Following Commands Follows one step commands without difficulty   Subjective   Subjective \"Sometimes I tear up for no reason because of my brain tumor.\"   Sit to Stand   Type of Assistance Needed Physical assistance;Verbal cues   Physical Assistance Level 25% or less   Comment min A   Sit to Stand CARE Score 3   Bed-Chair Transfer   Type of Assistance Needed Physical assistance;Verbal cues   Physical Assistance Level   (mod A with use of grab bar)   Chair/Bed-to-Chair Transfer CARE Score -   Transfer Bed/Chair/Wheelchair   Limitations Noted In Balance;Endurance;LE Strength;Problem Solving;UE Strength   Stand Pivot Moderate Assist   Sit to Stand Minimal Assist   Stand to Sit Minimal Assist   Findings Pt required mod A for SPT from w/c to toilet with use of grab bad.  Pt completed 5 reps of sit to stand with min A.  Cues for proper positioning of L LE.  Assist needed to facilitate use of L UE with sit <-> stand from w/c armrest. Pt completed sit <-> stand x 5 with focus on facilitating WB through L UE with sit <-> stand and safe placement of L UE.  Pt required min for completion of transfers.   Car Transfer   Reason if not Attempted Environmental limitations   Car Transfer CARE Score 10   Walk 10 Feet   Type of Assistance Needed Physical assistance;Verbal cues   Physical Assistance Level 25% or less   Comment min to occasional mod A   Walk 10 Feet CARE Score 3   Walk 50 Feet with Two Turns   Type of Assistance Needed Physical assistance;Verbal cues   Physical Assistance Level 25% or less   Comment min to occasional mod A "   Walk 50 Feet with Two Turns CARE Score 3   Walk 150 Feet   Reason if not Attempted Safety concerns   Walk 150 Feet CARE Score 88   Walking 10 Feet on Uneven Surfaces   Reason if not Attempted Safety concerns   Walking 10 Feet on Uneven Surfaces CARE Score 88   Ambulation   Primary Mode of Locomotion Prior to Admission Walk   Distance Walked (feet) 51 ft  (52')   Assist Device   (L platform RW)   Gait Pattern Slow [place];L foot drag;L hemiparesis;Decreased foot clearance  (Decreased weight shift to the R with cues.)   Limitations Noted In Balance;Device Management;Heel Strike;Endurance;Safety;Strength;Sequencing   Provided Assistance with: Balance;Limb Stabilization;Limb Advancement;Trunk Support   Walk Assist Level Chair Follow  (min to occasional mod A)   Findings L foot ace wrapped for DF assist. Pt gait trained with L platform RW with min A to occasional mod A.  Pt required intermittent manual assist to weight shift towards the R in order to improve pt's ability to advance the L LE. w/c follow provided.   Does the patient walk? 2. Yes   Wheel 50 Feet with Two Turns   Type of Assistance Needed Supervision   Comment R UE and R LE   Wheel 50 Feet with Two Turns CARE Score 4   Wheelchair mobility   Does the patient use a wheelchair? 1. Yes   Type of Wheelchair Used 1. Manual   Method Right upper extremity;Right lower extremity   Assistance Provided For Locking Brakes   Distance Level Surface (feet) 100 ft   Curb or Single Stair   Style negotiated Single stair   Physical Assistance Level 26%-50%   Comment Min to mod x 1 to ascend, mod A x 1 to descend.   1 Step (Curb) CARE Score 3   4 Steps   Reason if not Attempted Safety concerns   4 Steps CARE Score 88   12 Steps   Reason if not Attempted Safety concerns   12 Steps CARE Score 88   Stairs   Type Stairs   # of Steps 3   Assist Devices Bilateral Rail   Findings Cues for sequencing. Pt required min to mod A to ascend steps.  Assist required to place L foot onto step  due to toes catching on steps. Pt required mod A to descend steps with assist for placing foot onto step.   Toilet Transfer   Type of Assistance Needed Verbal cues;Physical assistance   Physical Assistance Level 26%-50%   Comment mod A   Toilet Transfer CARE Score 3   Toilet Transfer   Surface Assessed Raised Toilet   Limitations Noted In Balance;Problem Solving;Safety;UE Strength;LE Strength   Positioning Concerns Grab Bars   Findings Pt  completed transfer with use of grab bar.  Cues for sequencing.   Therapeutic Interventions   Strengthening Seated ex, step ups   Other transfers, steps, gait   Assessment   Treatment Assessment Pt motivated to participate.  Making good progress with therapy. Increased gait distance with gait with L platform RW. Pt required manual assist to weight shift onto R LE in order to advance the L LE.  L ankle ace wrapped to maintain DF.  Pt was able to ascend/descend 3 steps with 2 rails with min to mod A.  Assist needed to place L LE on and off steps. Cues for sequencing with good carryover.  Pt fatigued with increased activity. Rest breaks required. Will continue to progress gait, transfers, balance, strengthening, steps, and safety in order to maximize function and decrease risk for falls.   Problem List Decreased strength;Impaired balance;Decreased endurance;Decreased mobility;Decreased coordination;Decreased safety awareness;Impaired judgement   PT Barriers   Physical Impairment Decreased strength;Impaired balance;Decreased endurance;Decreased mobility;Decreased coordination;Decreased safety awareness;Impaired judgement   Functional Limitation Stair negotiation;Standing;Transfers;Walking   Plan   Treatment/Interventions Functional transfer training;LE strengthening/ROM;Elevations;Therapeutic exercise;Endurance training;Patient/family training;Bed mobility;Gait training   Progress Improving as expected   PT Therapy Minutes   PT Time In 0858   PT Time Out 1032   PT Total Time (minutes)  "94   PT Mode of treatment - Individual (minutes) 94   PT Mode of treatment - Concurrent (minutes) 0   PT Mode of treatment - Group (minutes) 0   PT Mode of treatment - Co-treat (minutes) 0   PT Mode of Treatment - Total time(minutes) 94 minutes   PT Cumulative Minutes 546   Therapy Time missed   Time missed? No     Seated ex: 2# R LE, 0# L LE  B hip hikes 2 x 15  B LAQs 2 x 15 with assist for L LE  L ankle DF/PF ROM    Step ups.   Pt completed 6\" step ups leading with R LE x 3.  Pt rested and completed a second set of 3 steps ups. B UE required. Mod to min A to complete.  "

## 2024-05-08 NOTE — TELEPHONE ENCOUNTER
Pt needs to be scheduled for chemo at [facility]. Plan is entered in [place] Pt needs four doses of Avastin every 2 weeks and taxotere every 3 week with Neulasta on pro. Pt will have taxotere and avastin on cycle 1 day1 then on day 15 of cycle 1 will have Avastin then on cycle 2 day 1 taxotere and cycel 2 day 8 Avastin then cycle 3 day 1 will have Avastina nd taxotere and that will be the last Avastin dose.

## 2024-05-08 NOTE — TEAM CONFERENCE
Acute RehabilitationTeam Conference Note  Date: 5/8/2024   Time: 11:34 AM       Patient Name:  Paras Pitts       Medical Record Number: [id number]   YOB: 1955  Sex: Male          Room/Bed:  B[facility] 213/B[facility] 213-02  Payor Info:  Payor: MEDICARE / Plan: MEDICARE PART A ONLY / Product Type: Medicare /      Admitting Diagnosis: Brain mass [G93.89]   Admit Date/Time:  5/1/2024  5:56 PM  Admission Comments: No comment available     Primary Diagnosis:  Seizure disorder (HCC)  Principal Problem: Seizure disorder (HCC)    Patient Active Problem List    Diagnosis Date Noted    Frontal mass of brain 05/02/2024    Seizure disorder (HCC) 05/02/2024    Left hemiparesis (HCC) 05/02/2024    Pseudobulbar affect 05/02/2024    Adjustment disorder 05/02/2024    Constipation by delayed colonic transit 04/25/2024    Seizure-like activity (HCC) 04/23/2024    Generalized weakness 04/20/2024    Tremors of nervous system 04/20/2024    History of CVA (cerebrovascular accident) 06/22/2023    Chemotherapy-induced neutropenia (HCC) 05/03/2023    Encounter for central line care 05/01/2023    Carcinoma of right lung (HCC) 04/13/2023    Lung nodule 03/21/2023    Brain mass 03/20/2023    Metastatic adenocarcinoma (HCC) 2023    Hypercholesterolemia 09/20/2021    Essential hypertension 09/11/2020    Annual physical exam 09/11/2020    Negative depression screening 02/24/2020    Overweight (BMI 25.0-29.9) 02/24/2020       Physical Therapy:    Weight Bearing Status: Weight Bearing as Tolerated  Transfers:  (mod A STS, mod A sit pivot no AD, MOD-max A SPT using grab bar)  Bed Mobility: Moderate Assistance, Maximum Assistance  Amulation Distance (ft): 33 feet  Ambulation: Moderate Assistance (with second person for wheelchair follow)  Assistive Device for Ambulation:  (PFRW)  Wheelchair Mobility Distance: 197 ft  Wheelchair Mobility: Minimal Assistance  Number of Stairs:  (TBA)  Discharge Recommendations: Home with:  DC Home with:: 24 Hour  Assisteance, Family Support, First Floor Setup, Home Physical Therapy    05/08/2024:   Patient motivated and participating in therapy.   Patient MOD-MAX A bed mobility, MOD A STS with PFRW, MOD A sit pivot surface to surfaces using no AD, MOD-MAX A SPT using grab bar(in bathroom), MOD A ambulation up to 33' level surfaces with PFRW and wheelchair follow, MIN A wheelchair mobility 197' level and unlevel  surfaces.  Steps not assessed.   Patient limited by decreased ROM/strength, decreased balance and safety, decreased endurance, impaired LE tone.  Patient may benefit from continued inpatient ARC PT to increase function, safety, and improved independence in prep for safe d/c to home with family and continued PT services as needed.    Occupational Therapy:  Eating: Supervision  Grooming: Supervision  Bathing: Assist of 2  Bathing: Assist of 2  Upper Body Dressing: Maximum Assistance, Moderate Assistance  Lower Body Dressing: Assist of 2  Toileting: Total Assistance (set-up with urinal)  Tub/Shower Transfer:  (TBA)  Toilet Transfer: Assist of 2  Cognition: Exceptions to WNL  Cognition: Decreased Safety, Decreased Comprehension, Decreased Attention  Orientation: Person, Place, Time, Situation  Discharge Recommendations: Home with:  DC Home with:: Home Occupational Therapy, First Floor Setup, Family Support       5/7/24: Pt's current LOF listed above. Pt's barriers include : decreased balance, endurance, generalized strength, ROM, coordination, cognition (attention, comprehension, problem solving), and safety awareness. Pt currently participating in TE, TA, ADL training, fxl transfers/mobility, cognitive training, NMR, and activity tolerance in order to progress towards goals. Pt would benefit from continued skilled OT to address barriers and prepare for safe d/c.    Speech Therapy:  Mode of Communication: Verbal  Cognition: Exceptions to WNL  Cognition: Decreased Memory, Decreased Executive Functions, Decreased  Attention  Orientation: Person, Place, Time, Situation  Diet Recommendations: Regular Diet, Thin  Discharge Recommendations: Home with:  DC Home with:: Family Support, Outpatient Speech Therapy  5/8   Comprehension   Assist Devices Glasses   Comprehension (FIM) 5 - Needs help/cues, repetition only RARELY ( < 10% of the time)   Expression   Expression (FIM) 5 - Needs help/cues only RARELY (< 10% of the time)   Social Interaction   Social Interaction (FIM) 5 - Interacts appropriately with others 90% of time   Problem Solving   Problem solving (FIM) 5 - Solves basic problems 90% of time   Memory   Memory (FIM) 5 - Recalls/performs request 90% of time   Sensation   Additional Comments Pt reports numbless L side ( baseline)       Cognitive Linguisitic Assessments   The Repeatable Battery for the Assessment of Neuropsychological Status (RBANS)  The Repeatable Battery for the Assessment of Neuropsychological Status (RBANS) is a brief, individually-administered assessment which measures attention, language, visuospatial/constructional abilities, and immediate & delayed memory. The RBANS is intended for use with adolescents to adults, ages 12 to 89 years. The following results were obtained during the administration of the assessment.     Form: A  “IE” indicates the scores from the initial evaluation (4/4/23). Form: A     Cognitive Domain/Subtest: Index Score: Percentile Rank: Classification: IE Index Score: Status:   IMMEDIATE MEMORY 81 10%ile Low Average 103 DECLINE        1. List Learning (21/40)            2. Story Memory (12/24)               VISUOSPATIAL/  CONSTRUCTIONAL 102 55%ile Average 109 NO CHANGE        3. Figure Copy (20/20)            4. Line Orientation (13/20)               LANGUAGE 82 12%ile Low Average 85 NO CHANGE        5. Picture Naming (10/10)            6. Semantic Fluency (8/40)               ATTENTION 82 12%ile Low Average 91 DECLINE        7. Digit Span (8/16)            8. Coding (28/89)                DELAYED MEMORY 101 53%ile Average 112 DECLINE        9. List Recall (5/10)            10. List Recognition (19/20)            11. Story Recall (7/12)            12. Figure Recall (13/20)                Sum of Index Scores:  448   500 DECLINE   Total Score:  85         Percentile: 16%ile         Classification: Low Average                             Nursing Notes:  Appetite: Good  Diet Type: Regular/House                      Diet Patient/Family Education Complete: Yes                         Type of Wound Patient/Family Education: Yes  Bladder: 2 - Maximal Assistance     Bladder Patient/Family Education: Yes  Bowel: 2 - Maximal Assistance     Bowel Patient/Family Education: Yes  Pain Location/Orientation: Location: Abdomen  Pain Score: 0                          Pain Patient/Family Education: Yes  Medication Management/Safety  Injectable: Lovenox  Safe Administration: Yes  Medication Patient/Family Education Complete: Yes    5/6/24 Patient is a recent admission to [facility] with seizure disorder.  Patient was still actively receiving cancer treatments prior to hospitalization.  Patient remains alert and oriented. Left sided weakness continues.  Trace lower extremity edema.  Patient remains continent of bowel and bladder.  Skin remains intact with just scattered bruising noted.  Patient requires two assist for transfers.      Case Management:     Discharge Planning  Living Arrangements: Lives w/ Spouse/significant other  Support Systems: Spouse/significant other, Friends/neighbors, Friend, Family members  Assistance Needed: n/a  Type of Current Residence: Private residence  Current Home Care Services: No  No notes on file    Is the patient actively participating in therapies? yes  List any modifications to the treatment plan: na    Barriers Interventions   Metastatic lung CA to brain, seizures Medical management and oversight, medication management   Left sided weakness with foot drop Therapeutic exercise, therapeutic  activity, consider multi-potus boot while in bed    Decreased balance ADL, transfer, gait training   Decreased cog, safety Cues, ADL, transfer, gait training   Decreased end Therapeutic exercise, therapeutic activity     Is the patient making expected progress toward goals? yes  List any update or changes to goals: na    Medical Goals: Patient will be able to manage medical conditions and comorbid conditions with medications and follow up upon completion of rehab program    Weekly Team Goals:   Rehab Team Goals  ADL Team Goal: Patient will require assist with ADLs with least restrictive device upon completion of rehab program  Bowel/Bladder Team Goal: Patient will be independent with bladder/bowel management with least restrictive device upon completion of rehab program  Transfer Team Goal: Patient will require assist with transfers with least restrictive device upon completion of rehab program  Locomotion Team Goal: Patient will require assist with locomotion with least restrictive device upon completion of rehab program  Cognitive Team Goal: Patient will return to premorbid level of cognitive activity upon completion of rehab program    Min/mod assist Bed mobility, transfers, and Min assist mobility  S wc mobility  CG/S self care  Mod I Comprehension, expression, memory, and problem solving     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

## 2024-05-08 NOTE — PROGRESS NOTES
"   05/08/24 1300   Pain Assessment   Pain Assessment Tool 0-10   Pain Score No Pain   Restrictions/Precautions   Precautions Bed/chair alarms;Cognitive;Fall Risk;Multiple lines;Supervision on toilet/commode;Seizure  (port R chest wall)   Weight Bearing Restrictions No   ROM Restrictions No   Exercise Tools   UE Ergometer Exercise attempted; pt's LUE dropped several times and hit the table d/t decreased strength.   Theraputty pt retrieved 5/12 items from green putty to work on improving bilat FMC. Pt completed 5 reps L lateral key pinch and 5 reps of putty squeezes.   Hand Gripper 5# digiflex 30 reps each hand   Neuromuscular Education   Functional Movement Patterns Saebo MAS tension 3.5 key matching activity; pt able to retrieve 5 keys from box with difficulty. OTS placed keys on towel to downgrade task.   Coordination   Dexterity Saebo MAS tension 3.5 to complete parquetry board L digits   Cognition   Overall Cognitive Status WFL   Arousal/Participation Alert;Responsive;Cooperative   Attention Attends with cues to redirect   Orientation Level Oriented X4   Memory Within functional limits   Following Commands Follows one step commands with increased time or repetition  (Pt required multiple verbal and visual cues when working on new exercises with puty.)   Activity Tolerance   Activity Tolerance Patient tolerated treatment well;Patient limited by fatigue   Other Comments   Assessment Pt participated with 90 minutes concurrent tx with Pt with similar goals with focus of overall strengthening to support increased safety and independence with ADL routines   Assessment   Treatment Assessment Pt agreeable to skilled OT session this PM focusing on LUE strength, ROM, and coordination; details listed in respective sections. Pt reports L arm feeling weak at the beginning of session, stating \"I couldn't hold my cheeseburger during lunch\". Despite this, pt was motivated to participate in session and overall tolerated treatment " well. Pt able to complete tasks with Saebo MAS more successfully than in previous sessions. Pt continues with barriers of decreased strength throughout but especially L side s/p previous CVA, decreased balance, impaired coordination L side, impaired safety awareness, problem solving, and attention, and decreased activity tolerance; all affect independence in self care and fxl transfers. Pt would benefit from continued skilled OT services in order to address listed barriers and prepare for safe d/c.   Prognosis Good   Problem List Decreased range of motion;Decreased strength;Decreased endurance;Impaired balance;Decreased coordination;Decreased cognition;Decreased safety awareness;Impaired tone   Plan   Treatment/Interventions ADL retraining;Functional transfer training;Endurance training;Therapeutic exercise;Cognitive reorientation;Patient/family training;Equipment eval/education;Bed mobility;Compensatory technique education;OT   Progress Progressing toward goals   OT Therapy Minutes   OT Time In 1300   OT Time Out 1430   OT Total Time (minutes) 90   OT Mode of treatment - Individual (minutes) 0   OT Mode of treatment - Concurrent (minutes) 90   Therapy Time missed   Time missed? No

## 2024-05-08 NOTE — NURSING NOTE
Patient resting in bed at this time.  No signs of distress noted.  Patient requires two assist to stand pivot transfer overnight.  Left sided weakness continues.  Bed alarm in plan to promote patient safety.  Patient was encouraged to reposition frequently to promote skin integrity.  Call bell within reach.  Plan of care ongoing.

## 2024-05-08 NOTE — TELEPHONE ENCOUNTER
Patient Call    Who are you speaking with? Spouse    If it is not the patient, are they listed on an active communication consent form? Yes   What is the reason for this call? She asked to discuss next step in treatment   Does this require a call back? Yes   If a call back is required, please list [facility] call back number [phone]    If a call back is required, advise that a message will be forwarded to their care team and someone will return their call as soon as possible.   Did you relay this information to the patient? Yes

## 2024-05-08 NOTE — TELEPHONE ENCOUNTER
Received phone call from pt's spouse to discuss pt's treatment plan of Avastin 5 mg/kg IV every 2 weeks for four doses along with Taxotere 75 mg/m2 IV Q 3 weeks with  Neulasta on pro. Wife would like pt to start before his approximate discharge date of 5/22/24. E mailed all medication information sheets to wife as well as chemo consent so this can be signed and taken to [facility] for first cycle. I also e mailed lab slips to wife so she can have these drawn before first cycle.

## 2024-05-08 NOTE — NURSING NOTE
Pt SPT assist x 2. Continues with left sided weakness. Continent of bowel and bladder. Pt in wheel chair with call bell in reach will continue to monitor and follow plan of care.

## 2024-05-08 NOTE — PROGRESS NOTES
PM&R PROGRESS NOTE:  Paras Pitts 68 y.o. male MRN: [id number]  Unit/Bed#: -02 Encounter: [id number]        Rehab Diagnosis: Impairment of mobility, safety, Activities of Daily Living (ADLs), and cognitive/communication skills due to Brain Dysfunction:  02.1  Non-Traumatic  Etiologic Diagnosis: New onset epilepsy/New onset seizure disorder in the setting new right frontal lobe vasogenic edema and radiation necrosis related to metastatic adenocarcinoma right frontal brain mass    HPI: Paras Pitts is a 68 y.o. 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  CT head showing right frontal lobe vasogenic edema with known underlying lesion.  MRI brain showing a right superior frontal mass resection and chemoradiation with unchanged linear enhancement along the surgical cavity compared to 3/26/2024 favoring radiation necrosis.  Patient seen by neurology.  Video EEG showing: Early in recording there was frequent or nearly continuous focal motor seizure and later there were two subclinical right fronto central electrographic seizures.  Patient placed on the following AED regimen: Keppra 2 g twice daily, Vimpat 200 mg twice daily, Onfi 5 mg twice daily.  Of note patient was significantly sedated with Ativan.   Patient to follow-up with epileptologist as an outpatient.  Patient was seen by radiation oncology regarding edema and  radiation necrosis and recommended to increase dexamethasone to 4 mg twice daily.  Patient is under the care of Dr. Langford for medical oncology and is to begin Avastin, chemotherapy radiation therapy for his recurrent mediastinal disease post rehabilitation.  After medical stabilization, patient found to have acute functional deficits from his mobility and self-care, therefore admitted to [facility] for acute inpatient rehabilitation.    SUBJECTIVE: Patient seen face to face.  No acute issues overnight.  Having intermittent sore throat.      Patient received a call from Dr. Contreras regarding a change in his infusion medication.      Today progressing in therapies walked > 150 feet with RW!    ASSESSMENT: Stable, progressing      PLAN:    Rehabilitation  Functional deficits:  Left hemiparesis, mild left impaired sensation in hand and foot related to previous stroke,, left inattention, impaired balance, impaired mobility, self care   Continue current rehabilitation plan of care to maximize function.  Left multipodus boot ordered to prevent plantar flexion contracture    I personally attended, reviewed, and discussed medical and functional updates in team conference today.  Please refer to advance care planning note for details.  Estimated Discharge: ITZEL MORALES 2-3 weeks    Current Function:  Physical Therapy Occupational Therapy Speech Therapy   Weight Bearing Status: Weight Bearing as Tolerated  Transfers:  (mod A STS, mod A sit pivot no AD, MOD-max A SPT using grab bar)  Bed Mobility: Moderate Assistance, Maximum Assistance  Amulation Distance (ft): 33 feet  Ambulation: Moderate Assistance (with second person for wheelchair follow)  Assistive Device for Ambulation:  (PFRW)  Wheelchair Mobility Distance: 197 ft  Wheelchair Mobility: Minimal Assistance  Number of Stairs:  (TBA)  Discharge Recommendations: Home with:  DC Home with:: 24 Hour Assisteance, Family Support, First Floor Setup, Home Physical Therapy    Eating: Supervision  Grooming: Supervision  Bathing: Assist of 2  Bathing: Assist of 2  Upper Body Dressing: Maximum Assistance, Moderate Assistance  Lower Body Dressing: Assist of 2  Toileting: Total Assistance (set-up with urinal)  Tub/Shower Transfer:  (TBA)  Toilet Transfer: Assist of 2  Cognition: Exceptions to WNL  Cognition: Decreased Safety, Decreased Comprehension, Decreased Attention  Orientation: Person, Place, Time, Situation   Mode of Communication: Verbal  Cognition: Exceptions to WNL  Cognition: Decreased Memory, Decreased Executive Functions, Decreased Attention  Orientation: Person, Place, Time, Situation  Diet Recommendations: Regular Diet, Thin  Discharge Recommendations: Home with:  DC Home with:: Family Support, Outpatient Speech Therapy       DVT prophylaxis  Continue SQ Lovenox     Bladder plan  Continent and voiding     Bowel plan  Continent      * Seizure disorder (HCC)  Assessment & Plan  New onset seizures presenting for 2024  CT head showing right frontal lobe vasogenic edema with known underlying lesion.    MRI brain showing a right superior frontal mass resection and chemoradiation with unchanged linear enhancement along the surgical cavity compared to 3/26/2024 favoring radiation necrosis.    Patient seen by neurology.    Video EEG showing: Early in recording there was frequent or nearly continuous focal motor seizure and later there were two subclinical right fronto central electrographic seizures.    Patient placed on the following AED regimen: Keppra 2 g twice daily, Vimpat 200 mg twice daily, Onfi 5 mg twice daily.  *Of note patient was significantly sedated with Ativan.  Continue seizure precautions     Patient to follow-up with neurology-epileptologist as an outpatient.    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  Dexamethasone was increased to 4 mg twice daily.    Patient is under the care of Dr. Langford for medical oncology and is to begin Avastin, chemotherapy radiation therapy for his recurrent mediastinal disease post rehabilitation.    Left hemiparesis (HCC)  Assessment & Plan  Worsening left hemiparesis likely related to recent radiation necrosis and vasogenic edema, seizures  Continue Decadron 4 mg twice daily  Continue acute rehabilitation program to maximize function      Brain mass  Assessment & Plan  Patient with known metastatsis to right frontal lobe s/p right frontal craniotomy in March 2023  Residual mild left hemiparesis but remained highly functional and independent.  Patient status post chemotherapy radiation SRT, immunotherapy  Follows with Dr. Langford    Adjustment disorder  Assessment & Plan  May benefit from a neuropsychology consultation if patient is agreeable    Pseudobulbar affect  Assessment & Plan  At times can be tearful and emotionally labile  May benefit from a future SSRI  Will monitor      History of CVA (cerebrovascular accident)  Assessment & Plan  Patient with ischemic CVA in 2011  Resultant mild left hemiparesis    Hypercholesterolemia  Assessment & Plan  Continue Lipitor 40 mg daily (home dose)    Essential hypertension  Assessment & Plan  Continue Norvasc 10 mg daily and losartan 50 mg daily (home dose)  Monitor BP and heart rate during rest and with activity  Internal medicine managing    Overweight (BMI 25.0-29.9)  Assessment & Plan  Dietary and lifestyle changes when able        Appreciate IM consultants medical co-management.  Labs, medications, and imaging personally reviewed.      ROS:  A ten point review of systems was completed on 05/08/24 and  "pertinent positives are listed in subjective section. All other systems reviewed were negative.     OBJECTIVE:   /74 (BP Location: Right arm)   Pulse 77   Temp 98.9 °F (37.2 °C) (Temporal)   Resp 16   Ht 5' 9\" (1.753 m)   Wt 91.6 kg (201 lb 15.1 oz)   SpO2 95%   BMI 29.82 kg/m²     Physical Exam  Vitals and nursing note reviewed.   Constitutional:       General: He is not in acute distress.     Appearance: He is well-developed.   HENT:      Head: Normocephalic.      Nose: Nose normal.   Eyes:      Conjunctiva/sclera: Conjunctivae normal.   Cardiovascular:      Pulses: Normal pulses.   Pulmonary:      Effort: Pulmonary effort is normal.      Breath sounds: No wheezing.   Abdominal:      General: There is no distension.      Palpations: Abdomen is soft.   Musculoskeletal:         General: No swelling.      Cervical back: Neck supple.   Skin:     General: Skin is warm.   Neurological:      Mental Status: He is alert and oriented to person, place, and time.      Motor: Weakness (left hemiparesis) present.   Psychiatric:         Mood and Affect: Mood normal.          Lab Results   Component Value Date    WBC 8.05 05/06/2024    HGB 12.3 05/06/2024    HCT 37.7 05/06/2024    MCV 97 05/06/2024     05/06/2024     Lab Results   Component Value Date    SODIUM 141 05/06/2024    K 3.6 05/06/2024     05/06/2024    CO2 29 05/06/2024    BUN 17 05/06/2024    CREATININE 1.05 05/06/2024    GLUC 151 (H) 05/06/2024    CALCIUM 8.5 05/06/2024     Lab Results   Component Value Date    INR 0.92 04/20/2024    INR 0.98 08/30/2023    INR 1.06 03/24/2023    PROTIME 12.6 04/20/2024    PROTIME 13.2 08/30/2023    PROTIME 14.0 03/24/2023           Current Facility-Administered Medications:     acetaminophen (TYLENOL) tablet 975 mg, 975 mg, Oral, Q8H PRN, Jenise Dawson PA-C    aluminum-magnesium hydroxide-simethicone (MAALOX) oral suspension 30 mL, 30 mL, Oral, Q6H PRN, Jenise Dawson PA-C    amLODIPine " (NORVASC) tablet 10 mg, 10 mg, Oral, Daily, Jenise Dawson PA-C, 10 mg at 05/08/24 0822    atorvastatin (LIPITOR) tablet 40 mg, 40 mg, Oral, After Dinner, Jenise Dawson PA-C, 40 mg at 05/07/24 1759    bisacodyl (DULCOLAX) rectal suppository 10 mg, 10 mg, Rectal, Daily PRN, Jenise Dawson PA-C    cloBAZam (ONFI) tablet 5 mg, 5 mg, Oral, BID, Jenise Dawson PA-C, 5 mg at 05/08/24 0821    dexamethasone (DECADRON) tablet 4 mg, 4 mg, Oral, Daily, Jenise Dawson PA-C, 4 mg at 05/08/24 0822    docusate sodium (COLACE) capsule 100 mg, 100 mg, Oral, BID PRN, Corina Arita MD    enoxaparin (LOVENOX) subcutaneous injection 40 mg, 40 mg, Subcutaneous, Daily, Jenise Dawson PA-C, 40 mg at 05/08/24 0823    lacosamide (VIMPAT) tablet 200 mg, 200 mg, Oral, Q12H LEN, Jenise Dawson PA-C, 200 mg at 05/08/24 0819    levETIRAcetam (KEPPRA) tablet 2,000 mg, 2,000 mg, Oral, Q12H LEN, Jenise Dawson PA-C, 2,000 mg at 05/08/24 0820    losartan (COZAAR) tablet 50 mg, 50 mg, Oral, Daily, Jenise Dawson PA-C, 50 mg at 05/08/24 0822    ondansetron (ZOFRAN-ODT) dispersible tablet 4 mg, 4 mg, Oral, Q6H PRN, Jenise Dawson PA-C    pantoprazole (PROTONIX) EC tablet 40 mg, 40 mg, Oral, Early Morning, Jenise Dawson PA-C, 40 mg at 05/08/24 0546    polyethylene glycol (MIRALAX) packet 17 g, 17 g, Oral, Daily PRN, Corina Arita MD    pramipexole (MIRAPEX) tablet 0.25 mg, 0.25 mg, Oral, TID, Jenise Dawson PA-C, 0.25 mg at 05/08/24 0822    Past Medical History:   Diagnosis Date    Brain compression (HCC) 03/20/2023    Brain mass 03/20/2023    Cancer (HCC) 2001    Receint lung and brain lesions and prostate cancer in 2001    Cerebral edema (HCC) 03/20/2023    Hypertension     Prostate cancer (HCC) 2001    Rectal bleeding     Stroke (HCC)     2011         Patient Active Problem List    Diagnosis Date Noted    Seizure disorder (HCC)  05/02/2024    Metastatic adenocarcinoma (HCC) 2023    Left hemiparesis (HCC) 05/02/2024    Brain mass 03/20/2023    Frontal mass of brain 05/02/2024    Pseudobulbar affect 05/02/2024    Adjustment disorder 05/02/2024    Constipation by delayed colonic transit 04/25/2024    Seizure-like activity (HCC) 04/23/2024    Generalized weakness 04/20/2024    Tremors of nervous system 04/20/2024    History of CVA (cerebrovascular accident) 06/22/2023    Chemotherapy-induced neutropenia (HCC) 05/03/2023    Encounter for central line care 05/01/2023    Carcinoma of right lung (HCC) 04/13/2023    Lung nodule 03/21/2023    Hypercholesterolemia 09/20/2021    Essential hypertension 09/11/2020    Annual physical exam 09/11/2020    Negative depression screening 02/24/2020    Overweight (BMI 25.0-29.9) 02/24/2020          Corina Arita MD    I have spent a total time of 55 minutes on 05/08/24 in caring for this patient including Impressions, Documenting in the medical record, and weekly team conference discussion .      ** Please Note:  voice to text software may have been used in the creation of this document. Although proof errors in transcription or interpretation are a potential of such software**

## 2024-05-09 PROCEDURE — 92507 TX SP LANG VOICE COMM INDIV: CPT

## 2024-05-09 PROCEDURE — 92526 ORAL FUNCTION THERAPY: CPT

## 2024-05-09 PROCEDURE — 97542 WHEELCHAIR MNGMENT TRAINING: CPT

## 2024-05-09 PROCEDURE — 97110 THERAPEUTIC EXERCISES: CPT

## 2024-05-09 PROCEDURE — 97530 THERAPEUTIC ACTIVITIES: CPT

## 2024-05-09 PROCEDURE — 97535 SELF CARE MNGMENT TRAINING: CPT

## 2024-05-09 PROCEDURE — 99232 SBSQ HOSP IP/OBS MODERATE 35: CPT | Performed by: PHYSICAL MEDICINE & REHABILITATION

## 2024-05-09 PROCEDURE — 97116 GAIT TRAINING THERAPY: CPT

## 2024-05-09 RX ADMIN — LOSARTAN POTASSIUM 50 MG: 50 TABLET, FILM COATED ORAL at 09:06

## 2024-05-09 RX ADMIN — PRAMIPEXOLE DIHYDROCHLORIDE 0.25 MG: 0.12 TABLET ORAL at 16:20

## 2024-05-09 RX ADMIN — ENOXAPARIN SODIUM 40 MG: 40 INJECTION SUBCUTANEOUS at 09:06

## 2024-05-09 RX ADMIN — PANTOPRAZOLE SODIUM 40 MG: 40 TABLET, DELAYED RELEASE ORAL at 05:56

## 2024-05-09 RX ADMIN — CLOBAZAM 5 MG: 10 TABLET ORAL at 09:07

## 2024-05-09 RX ADMIN — LEVETIRACETAM 2000 MG: 500 TABLET, FILM COATED ORAL at 21:00

## 2024-05-09 RX ADMIN — LACOSAMIDE 200 MG: 150 TABLET ORAL at 09:06

## 2024-05-09 RX ADMIN — GUAIFENESIN AND DEXTROMETHORPHAN 10 ML: 100; 10 SYRUP ORAL at 23:33

## 2024-05-09 RX ADMIN — AMLODIPINE BESYLATE 10 MG: 10 TABLET ORAL at 09:06

## 2024-05-09 RX ADMIN — CLOBAZAM 5 MG: 10 TABLET ORAL at 17:38

## 2024-05-09 RX ADMIN — ATORVASTATIN CALCIUM 40 MG: 40 TABLET, FILM COATED ORAL at 17:38

## 2024-05-09 RX ADMIN — DEXAMETHASONE 4 MG: 4 TABLET ORAL at 09:07

## 2024-05-09 RX ADMIN — PRAMIPEXOLE DIHYDROCHLORIDE 0.25 MG: 0.12 TABLET ORAL at 21:38

## 2024-05-09 RX ADMIN — LACOSAMIDE 200 MG: 150 TABLET ORAL at 21:38

## 2024-05-09 RX ADMIN — PRAMIPEXOLE DIHYDROCHLORIDE 0.25 MG: 0.12 TABLET ORAL at 09:07

## 2024-05-09 RX ADMIN — LEVETIRACETAM 2000 MG: 500 TABLET, FILM COATED ORAL at 09:11

## 2024-05-09 NOTE — PROGRESS NOTES
"   05/09/24 1000   Pain Assessment   Pain Assessment Tool 0-10   Pain Score No Pain   Restrictions/Precautions   Precautions Bed/chair alarms;Cognitive;Fall Risk;Seizure;Supervision on toilet/commode  (port in R chest wall)   Weight Bearing Restrictions No   ROM Restrictions No   Cognition   Overall Cognitive Status Impaired   Arousal/Participation Alert;Cooperative   Attention Attends with cues to redirect   Orientation Level Oriented X4   Memory Decreased recall of recent events   Following Commands Follows one step commands with increased time or repetition   Subjective   Subjective \"My L arm is really weak today.\"   Sit to Stand   Type of Assistance Needed Physical assistance;Verbal cues   Physical Assistance Level 25% or less   Comment min A   Sit to Stand CARE Score 3   Transfer Bed/Chair/Wheelchair   Limitations Noted In Balance;Endurance;LE Strength   Adaptive Equipment Roller Walker  (L platform RW)   Sit to Stand Minimal Assist   Stand to Sit Minimal Assist   Findings Pt required cues for safe hand placement with transfers, as well as to faciliate WB through L UE and placement of L UE on and off platform.   Walk 10 Feet   Type of Assistance Needed Physical assistance;Verbal cues   Physical Assistance Level 26%-50%   Comment min A with L platform RW   Walk 10 Feet CARE Score 3   Walk 50 Feet with Two Turns   Reason if not Attempted Safety concerns   Walk 50 Feet with Two Turns CARE Score 88   Walk 150 Feet   Reason if not Attempted Safety concerns   Walk 150 Feet CARE Score 88   Walking 10 Feet on Uneven Surfaces   Reason if not Attempted Safety concerns   Walking 10 Feet on Uneven Surfaces CARE Score 88   Ambulation   Primary Mode of Locomotion Prior to Admission Walk   Distance Walked (feet) 30 ft  (32', 45')   Assist Device Roller Walker  (L platform RW)   Gait Pattern Decreased foot clearance;L hemiparesis;Decreased L stance   Limitations Noted In Balance;Coordination;Device " Management;Endurance;Safety;Strength;Speed   Provided Assistance with: Balance;Trunk Support;Weight Shift   Walk Assist Level   (min A)   Findings Pt required frequent assist for weight shifting onto R LE.  With appropriate weight shift onto R LE, pt was able to advance the L LE without assist.  Pt did require intermittent assist today to advance the L LE due to fatigue.   Does the patient walk? 2. Yes   Wheel 50 Feet with Two Turns   Type of Assistance Needed Incidental touching   Physical Assistance Level 25% or less   Comment min to S   Wheel 50 Feet with Two Turns CARE Score 3   Wheel 150 Feet   Type of Assistance Needed Incidental touching;Verbal cues   Physical Assistance Level 25% or less   Comment min to S   Wheel 150 Feet CARE Score 3   Wheelchair mobility   Does the patient use a wheelchair? 1. Yes   Type of Wheelchair Used 1. Manual   Method Right upper extremity;Right lower extremity   Assistance Provided For Locking Brakes;Obstacles   Distance Level Surface (feet) 150 ft   Findings Supervision for mobility in straight line.  Min to S with the w/c for negotiating around obstacles and for locking w/c brakes.   Therapeutic Interventions   Strengthening seated ex   Other gait, transfers   Assessment   Treatment Assessment Pt motivated to participate. Limited by reports of fatigue. Pt also reporting decreased strength in L UE today compared to yesterday.  Despite reports of fatigue, pt was able to complete 3 gait trials of 30'-45' with the L platform RW with min A.  Pt requires ace wrap of L ankle to maintain DF. Attempted MAFO, but unable to fit MAFO and pt's foot in his shoe.  Pt's L foot edematous.  Pt will benefit from continued PT to progress gait, transfers, steps, strengthening, balance, and safety in order to maximize function and decrease risk for falls.   Problem List Decreased strength;Decreased endurance;Impaired balance;Decreased mobility;Decreased safety awareness;Decreased cognition   PT  Barriers   Physical Impairment Decreased strength;Decreased endurance;Impaired balance;Decreased mobility;Decreased safety awareness;Decreased cognition   Functional Limitation Stair negotiation;Standing;Transfers;Walking;Wheelchair management   Plan   Treatment/Interventions Functional transfer training;LE strengthening/ROM;Elevations;Therapeutic exercise;Endurance training;Patient/family training;Bed mobility;Gait training   Progress Progressing toward goals   PT Therapy Minutes   PT Time In 1003   PT Time Out 1103   PT Total Time (minutes) 60   PT Mode of treatment - Individual (minutes) 60   PT Mode of treatment - Concurrent (minutes) 0   PT Mode of treatment - Group (minutes) 0   PT Mode of treatment - Co-treat (minutes) 0   PT Mode of Treatment - Total time(minutes) 60 minutes   PT Cumulative Minutes 606   Therapy Time missed   Time missed? No     Seated ex: 2# R LE, 0# L LE  R hip hikes 2 x 15, L hip hikes 2 x 10 with assist  R LAQs 2 x 15, L LAQs 2 x 10 with assist  R hamstring curls with green t-band 2 x 15, L heel slides with towel under foot 2 x 10 with assist.

## 2024-05-09 NOTE — PROGRESS NOTES
05/09/24 0700   Pain Assessment   Pain Assessment Tool 0-10   Pain Score No Pain   Restrictions/Precautions   Precautions Bed/chair alarms;Cognitive;Fall Risk;Multiple lines;Supervision on toilet/commode;Seizure  (port R chest wall)   Weight Bearing Restrictions No   ROM Restrictions No   Eating   Type of Assistance Needed Set-up / clean-up   Eating CARE Score 5   Eating Assessment   Food To Mouth Yes   Able To Cut Yes   Positioning Upright;Out of Bed   Meal Assessed Breakfast   Current Diet Regular   Opens Packages No   Oral Hygiene   Type of Assistance Needed Independent   Comment seated in w/c at sink   Oral Hygiene CARE Score 6   Grooming   Able To Wash/Dry Hands;Brush/Clean Teeth;Wash/Dry Face;Comb/Brush Hair;Acquire Items   Limitation Noted In Strength;Safety;Problem Solving;Coordination   Findings seated in w/c at sink   Shower/Bathe Self   Type of Assistance Needed Physical assistance   Physical Assistance Level 51%-75%   Comment Pt able to wash all areas except for buttocks. Pt required modA to stand and maintain balance while using sink for support. Pt able to stabilize with L arm on sink to and utilize R arm to wash perineal area.   Shower/Bathe Self CARE Score 2   Bathing   Assessed Bath Style Sponge Bath   Anticipated D/C Bath Style Shower;Sponge Bath   Able to Gather/Transport No   Able to Wash/Rinse/Dry (body part) Left Arm;Right Arm;L Upper Leg;R Upper Leg;L Lower Leg/Foot;R Lower Leg/Foot;Chest;Abdomen;Perineal Area   Limitations Noted in Balance;Endurance;Coordination;Problem Solving;Safety;ROM;Strength   Positioning Seated;Standing   Findings  cues for thoroughness and CHG wipes   Tub/Shower Transfer   Reason Not Assessed Sponge Bath   Upper Body Dressing   Type of Assistance Needed Physical assistance   Physical Assistance Level 51%-75%   Comment pt with difficulty doffing shirt. Pt struggled to doff shirt using compensatory CVA method and could not lift shirt over head independently. OTS  aided in lifting shirt over head, pt then able to unthread bilat arms. Pt required Alek to don shirt; OTS pulled shirt over L shoulder.   Upper Body Dressing CARE Score 2   Lower Body Dressing   Type of Assistance Needed Physical assistance   Physical Assistance Level Total assistance   Comment assist x2; initially modA x1 to pull up undergarment, however L knee buckled and OT stepped in to help balance pt and pull up pants.   Lower Body Dressing CARE Score 1   Putting On/Taking Off Footwear   Type of Assistance Needed Supervision   Physical Assistance Level No physical assistance   Comment pt seated in w/c, compensatory method used. Pt able to prop leg on opposite knee to don/doff socks while in supportive sit.   Putting On/Taking Off Footwear CARE Score 4   Picking Up Object   Reason if not Attempted Safety concerns   Picking Up Object CARE Score 88   Dressing/Undressing Clothing   Remove UB Clothes Pullover Shirt   Don UB Clothes Pullover Shirt   Remove LB Clothes Undergarment;Socks   Don LB Clothes Pants;Undergarment;Socks   Limitations Noted In Balance;Coordination;Endurance;Problem Solving;Safety;Strength;ROM   Positioning In Bed;Standing;Supported Sit   Findings Pt states he is able to don undergarment while lying supine in bed. OTS observed this and concluded that pt requires increased time to don undergarment using this method. OT/OTS provided verbal cues for time efficient and compensatory methods.   Roll Left and Right   Type of Assistance Needed Supervision   Comment OTS suggested pt to roll onto L side to allow stronger R side to compensate while attempting to sit up. Noted that pt may be more successful rolling onto R side to complete lying to sitting EOB transfer with less assistance.   Roll Left and Right CARE Score 4   Lying to Sitting on Side of Bed   Type of Assistance Needed Physical assistance;Verbal cues   Physical Assistance Level 51%-75%   Comment Pt able to use compensatory method to remove  legs from bed with verbal cues, however struggled to pull trunk into unsupported sit EOB. Pt's assist level increased as OTS began to asssit.   Lying to Sitting on Side of Bed CARE Score 2   Sit to Stand   Type of Assistance Needed Physical assistance   Physical Assistance Level 26%-50%   Comment min-modA   Sit to Stand CARE Score 3   Bed-Chair Transfer   Type of Assistance Needed Physical assistance;Verbal cues   Physical Assistance Level 51%-75%   Comment mod-maxA, verbal cues for hand and foot placement   Chair/Bed-to-Chair Transfer CARE Score 2   Transfer Bed/Chair/Wheelchair   Positioning Concerns Cognition   Limitations Noted In Balance;Coordination;Endurance;Problem Solving;UE Strength;LE Strength;Confidence   Toileting   Findings pt declined toileting, noted to have mild residual bowel during bathing.   Cognition   Overall Cognitive Status Impaired   Arousal/Participation Alert;Responsive;Cooperative   Attention Difficulty dividing attention   Orientation Level Oriented X4   Memory Decreased recall of recent events;Decreased recall of precautions   Following Commands Follows one step commands with increased time or repetition   Activity Tolerance   Activity Tolerance Patient tolerated treatment well   Assessment   Treatment Assessment Pt agreeable to skilled OT session this AM, focusing on ADL training, fxl transfers/mobility, and compensatory strategies; details listed in respective sections. Pt required frequent verbal cues throughout entire session for compensatory methods, encouragement, and thoroughness completing ADL tasks. Pt also required verbal cues for hand and foot placement of L side throughout session. Pt educated to place LUE in protected position while not in use. Pt encouraged to incorporate L side into tasks throughout the session. Pt noted to have poor compensatory strategies, for example, pt resots to kicking L foot in order to move it. Pt educated to lift L leg using bilat arms to  position it more efficiently and safely. Pt noted to repeat topics frequently throughout session and struggles to recall compensatory strategies with verbal cues. Pt continues with barriers of decreased strength throughout but especially L side s/p previous CVA, decreased balance, impaired coordination L side, impaired safety awareness, problem solving, and attention, and decreased activity tolerance; all affect independence in self care and fxl transfers. Pt would benefit from continued skilled OT services in order to address listed barriers and prepare for safe d/c.   Prognosis Good   Problem List Decreased strength;Decreased range of motion;Decreased endurance;Impaired balance;Decreased coordination;Decreased cognition;Impaired judgement;Decreased safety awareness;Impaired tone   Plan   Treatment/Interventions ADL retraining;Functional transfer training;Therapeutic exercise;Endurance training;Cognitive reorientation;Patient/family training;Equipment eval/education;Bed mobility;Compensatory technique education;Spoke to nursing;OT   Progress Progressing toward goals   OT Therapy Minutes   OT Time In 0700   OT Time Out 0830   OT Total Time (minutes) 90   OT Mode of treatment - Individual (minutes) 90   Therapy Time missed   Time missed? No

## 2024-05-09 NOTE — PROGRESS NOTES
"Progress Note - Paras Pitts 68 y.o. male MRN: [id number]    Unit/Bed#: [facility] 213-02 Encounter: [id number]        Subjective:   Patient seen and examined at bedside after reviewing the chart and discussing the case with the caring staff.      Patient examined at bedside.  Patient has no acute symptoms.     Physical Exam   Vitals: Blood pressure 150/93, pulse 84, temperature 98.2 °F (36.8 °C), temperature source Temporal, resp. rate 16, height 5' 9\" (1.753 m), weight 91.6 kg (201 lb 15.1 oz), SpO2 98%.,Body mass index is 29.82 kg/m².  Constitutional: Patient in no acute distress.  HEENT: PERR, EOMI, MMM.  Cardiovascular: Normal rate and regular rhythm.    Pulmonary/Chest: Effort normal and breath sounds normal.   Abdomen: Soft, + BS, NT.    Assessment/Plan:  Paras Pitts is a(n) 68 y.o. male with tremors of the nervous system.     Cardiac Hx w/ HTN, HLD, hx of CVA.  Continue amlodipine 10 mg daily, Losartan 50 mg daily, atorvastatin 40 mg daily.  Type 2 diabetes mellitus.  Hgb A1c 6.7% on 5/3/24.  Carb controlled diet.   GERD.  Continue Protonix 40 mg daily.  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  Stage Nicholas.  S/p robotic converted to right thoracotomy and right upper lobectomy on 9/1/2023. S/p bronchoscopy with EBUS at [facility] on 4/16/2024.  Continue Decadron 4 mg daily.  Patient completed CT[facility]SIM at . [facility] 4/29.  Further radiation treatment in 1-2 weeks per oncologist, Dr. Contreras.  Pain and bowel regimen. As per physiatry.  Tremors of the nervous system.  Continue Keppra twice daily, Vimpat 100 mg twice daily, clobazam 0.5 mg twice daily.  Follow up in 4 weeks with epilepsy attending.  Patient receiving intensive PT OT as per physiatry.     Anticipated date of discharge.  Wednesday, 5/22/2024    The patient was discussed with Dr. Andrade and he is in agreement with the above note.  "

## 2024-05-09 NOTE — OCCUPATIONAL THERAPY NOTE
Pt completed MoCA. See scanned assessment for details. Deficits noted in attention, language fluency,  abstraction, delayed recall, orientation, and serial subtraction. Shared with SLP.

## 2024-05-09 NOTE — PROGRESS NOTES
05/09/24 1205   Pain Assessment   Pain Assessment Tool 0-10   Pain Score No Pain   Pain Location/Orientation Location: Abdomen   Pain Onset/Description Onset: Ongoing   Effect of Pain on Daily Activities guarding   Patient's Stated Pain Goal No pain   Restrictions/Precautions   Precautions Bed/chair alarms;Cognitive;Fall Risk;Seizure;Supervision on toilet/commode   Weight Bearing Restrictions No   ROM Restrictions No   Cognitive Linguisitic Assessments   Cognitive Linquistic Quick Test (CLQT) RBANS completed   Comprehension   Assist Devices Glasses   QI: Comprehension 3. Usually Understands: Understands most conversations, but misses some part/intent of message. Requires cues at times to understand.   Comprehension (FIM) 5 - Needs help/cues, repetition only RARELY ( < 10% of the time)   Expression   QI: Expression 4. Express complex messages without difficulty and with speech that is clear and easy to understan   Expression (FIM) 5 - Needs help/cues only RARELY (< 10% of the time)   Social Interaction   Social Interaction (FIM) 5 - Interacts appropriately with others 90% of time   Problem Solving   Problem solving (FIM) 5 - Solves basic problems 90% of time   Memory   Memory (FIM) 5 - Needs cueing reminders <10%   Memory Skills   Orientation Level Oriented X4   Language Assessments   Agency Diagnostic Aphasia Exam (BDAE) BDAE short form   Informal Speech Language Assessment word finding errors   Speech/Language/Cognition Assessmetn   Treatment Assessment Patient engaged in conversation today during meal.  Patient able to maintain topics that he selects, participates in turn taking and exchange.  Patient does not appear to  on sayings, sarcasm or other social nuances around him.  He discusses hobbies and his own history with ease, difficulty with topic shifting.  Patient's overall processing appears delayed today, more effortful to participate in non-self driven conversation.  He is able to recall 2-3 items  completed in PT.  Pt with minimal OT recall.   Swallow Information   Current Risks for Dysphagia & Aspiration New Neuro event;Brain injury;Cognitive deficit   Current Symptoms/Concerns Clear throat   Current Diet Regular;Thin liquid   Baseline Diet Regular;Thin liquids   Consistencies Assessed and Performance   Materials Admnistered Regular/Solid;Thin liquid;Pills   Oral Stage WFL   Phargngeal Stage Mild impaired   Swallow Mechanics Mild delayed   Strategies and Efficacy small bites, small sips, slow rate   Recommendations   Risk for Aspiration Mild   Recommendations Consider oral diet;Dysphagia treatment   Diet Solid Recommendation Regular consistency   Diet Liquid Recommendation Thin liquid   Recommended Form of Meds Whole;With puree   General Precautions Aspiration precautions   Compensatory Swallowing Strategies Alternate solids and liquids   Results Reviewed with RN;PT/Family/Caregiver   Eating   Type of Assistance Needed Set-up / clean-up   Physical Assistance Level No physical assistance   Eating CARE Score 5   Swallow Assessment   Swallow Treatment Assessment Patient received sitting upright in OT suite with lunch tray. Patient consuming lunch tray of regular/thin, items included salad, egg noodles, beef, thin items via straw and cake. Patient with appropriate pacing, did engage in conversation with his mouth full. This was discouraged as much as possible. Pt with good overall mastication, transfer timing and swallow timing all appropriate. Patient minimally impulsive during meal, encouraged pacing for safety. Patient verbalized his understanding.  Overt s/s of penetration or aspiration x2 during pie, pt using liquid wash.  Vocal quality clear following.   Swallow Assessment Prognosis   Prognosis Good   Prognosis Considerations Co-morbidities   SLP Therapy Minutes   SLP Time In 1205   SLP Time Out 1240   SLP Total Time (minutes) 35   SLP Mode of treatment - Individual (minutes) 0   SLP Mode of treatment -  Concurrent (minutes) 35   SLP Mode of treatment - Group (minutes) 0   SLP Mode of treatment - Co-treat (minutes) 0   SLP Mode of Treatment - Total time(minutes) 35 minutes   SLP Cumulative Minutes 365   Therapy Time missed   Time missed? No

## 2024-05-09 NOTE — PROGRESS NOTES
PM&R PROGRESS NOTE:  Paras Pitts 68 y.o. male MRN: [id number]  Unit/Bed#: -02 Encounter: [id number]        Rehab Diagnosis: Impairment of mobility, safety, Activities of Daily Living (ADLs), and cognitive/communication skills due to Brain Dysfunction:  02.1  Non-Traumatic  Etiologic Diagnosis: New onset epilepsy/New onset seizure disorder in the setting new right frontal lobe vasogenic edema and radiation necrosis related to metastatic adenocarcinoma right frontal brain mass    HPI: Paras Pitts is a 68 y.o. 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  CT head showing right frontal lobe vasogenic edema with known underlying lesion.  MRI brain showing a right superior frontal mass resection and chemoradiation with unchanged linear enhancement along the surgical cavity compared to 3/26/2024 favoring radiation necrosis.  Patient seen by neurology.  Video EEG showing: Early in recording there was frequent or nearly continuous focal motor seizure and later there were two subclinical right fronto central electrographic seizures.  Patient placed on the following AED regimen: Keppra 2 g twice daily, Vimpat 200 mg twice daily, Onfi 5 mg twice daily.  Of note patient was significantly sedated with Ativan.   Patient to follow-up with epileptologist as an outpatient.  Patient was seen by radiation oncology regarding edema and  radiation necrosis and recommended to increase dexamethasone to 4 mg twice daily.  Patient is under the care of Dr. Langford for medical oncology and is to begin Avastin, chemotherapy radiation therapy for his recurrent mediastinal disease post rehabilitation.  After medical stabilization, patient found to have acute functional deficits from his mobility and self-care, therefore admitted to [facility] for acute inpatient rehabilitation.    SUBJECTIVE: Patient seen face-to-face today in the gym.  Yesterday had a phenomenal day in therapy, today his leg is tired and seeing a little less improvement.  Denies pain, denies headaches, denies any twitching on his left side.  He did receive a phone call from his oncology team who would like to start his first chemo treatment on 5/15/2024. [facility] management checking on if this is possible during his [facility] stay.    ASSESSMENT: Stable, progressing      PLAN:    Rehabilitation  Functional deficits:  Left hemiparesis, mild left impaired sensation in hand and foot related to previous stroke,, left inattention, impaired balance, impaired mobility, self care   Continue current rehabilitation plan of care to maximize function.  Left multipodus boot ordered to prevent plantar flexion contracture    Estimated Discharge: RETEAM.  ELOS 2-3 weeks    Current Function:  Physical Therapy Occupational Therapy Speech Therapy   Weight Bearing Status: Weight Bearing as Tolerated  Transfers:  (mod A STS, mod A sit pivot no AD, MOD-max A SPT using grab bar)  Bed Mobility: Moderate Assistance, Maximum Assistance  Amulation Distance (ft): 33 feet  Ambulation: Moderate Assistance (with second person for wheelchair follow)  Assistive Device for Ambulation:  (PFRW)  Wheelchair Mobility Distance: 197 ft  Wheelchair Mobility: Minimal Assistance  Number of Stairs:  (TBA)  Discharge Recommendations: Home with:  DC Home with:: 24 Hour Assisteance, Family Support, First Floor Setup, Home Physical Therapy    Eating: Supervision  Grooming: Supervision  Bathing: Assist of 2  Bathing: Assist of 2  Upper Body Dressing: Maximum Assistance, Moderate Assistance  Lower Body Dressing: Assist of 2  Toileting: Total Assistance (set-up with urinal)  Tub/Shower Transfer:  (TBA)  Toilet Transfer: Assist of 2  Cognition: Exceptions to WNL  Cognition: Decreased Safety, Decreased Comprehension, Decreased Attention  Orientation: Person, Place, Time, Situation   Mode of Communication: Verbal  Cognition: Exceptions to WNL  Cognition: Decreased Memory, Decreased Executive Functions, Decreased Attention  Orientation: Person, Place, Time, Situation  Diet Recommendations: Regular Diet, Thin  Discharge Recommendations: Home with:  DC Home with:: Family Support, Outpatient Speech Therapy       DVT prophylaxis  Continue SQ Lovenox     Bladder plan  Continent and voiding     Bowel plan  Continent  Last BM 5/9/2024      * Seizure disorder (HCC)  Assessment & Plan  New onset seizures presenting for 2024  CT head showing right frontal lobe vasogenic edema with known underlying lesion.    MRI brain showing a right superior frontal mass resection and chemoradiation with unchanged linear enhancement along the surgical cavity compared to 3/26/2024 favoring radiation necrosis.    Patient seen by neurology.    Video EEG showing: Early in recording there was frequent or nearly continuous focal motor seizure and later there were two subclinical right fronto central electrographic seizures.    Patient placed on the following AED regimen: Keppra 2 g twice daily, Vimpat 200 mg twice daily, Onfi 5 mg twice daily.  *Of note patient was significantly sedated with Ativan.  Continue seizure precautions     Patient to follow-up with neurology-epileptologist as an outpatient.    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  Dexamethasone was increased to 4 mg twice daily.    Patient is under the care of Dr. Langford for medical oncology and is to begin Avastin, chemotherapy radiation therapy for his recurrent mediastinal disease post rehabilitation.  First infusion set for 5/15/2024. [facility] management checking on if this is possible during his [facility] stay.     Left hemiparesis (HCC)  Assessment & Plan  Worsening left hemiparesis likely related to recent radiation necrosis and vasogenic edema, seizures  Continue Decadron 4 mg twice daily  Continue acute rehabilitation program to maximize function      Brain mass  Assessment & Plan  Patient with known metastatsis to right frontal lobe s/p right frontal craniotomy in March 2023  Residual mild left hemiparesis but remained highly functional and independent.  Patient status post chemotherapy radiation SRT, immunotherapy  Follows with Dr. Langford    Sore throat  Assessment & Plan  PRN Robitussin ordered    Adjustment disorder  Assessment & Plan  May benefit from a neuropsychology consultation if patient is agreeable    Pseudobulbar affect  Assessment & Plan  Mood is much improved without emotional lability noted (week of 5/6/2024)  On admission at times was tearful and emotionally labile  May benefit from a future SSRI if continues      History of CVA (cerebrovascular accident)  Assessment & Plan  Patient with ischemic CVA in 2011  Resultant mild left hemiparesis    Hypercholesterolemia  Assessment & Plan  Continue Lipitor 40 mg daily (home dose)    Essential hypertension  Assessment & Plan  Continue Norvasc 10 mg daily and losartan 50 mg daily (home dose)  Monitor BP and heart rate during rest and with activity  Internal medicine  "managing    Overweight (BMI 25.0-29.9)  Assessment & Plan  Dietary and lifestyle changes when able        Appreciate IM consultants medical co-management.  Labs, medications, and imaging personally reviewed.      ROS:  A ten point review of systems was completed on 05/09/24 and pertinent positives are listed in subjective section. All other systems reviewed were negative.     OBJECTIVE:   /93 (BP Location: Right arm)   Pulse 84   Temp 98.2 °F (36.8 °C) (Temporal)   Resp 16   Ht 5' 9\" (1.753 m)   Wt 91.6 kg (201 lb 15.1 oz)   SpO2 98%   BMI 29.82 kg/m²     Physical Exam  Vitals and nursing note reviewed.   Constitutional:       General: He is not in acute distress.  HENT:      Head: Normocephalic and atraumatic.      Nose: Nose normal.      Mouth/Throat:      Mouth: Mucous membranes are moist.   Eyes:      Conjunctiva/sclera: Conjunctivae normal.   Cardiovascular:      Rate and Rhythm: Normal rate and regular rhythm.      Pulses: Normal pulses.   Pulmonary:      Effort: Pulmonary effort is normal.      Breath sounds: Normal breath sounds. No wheezing or rales.   Abdominal:      General: Bowel sounds are normal. There is no distension.      Palpations: Abdomen is soft.      Tenderness: There is no abdominal tenderness.   Musculoskeletal:         General: No swelling.      Cervical back: Neck supple.   Skin:     General: Skin is warm.   Neurological:      Mental Status: He is alert and oriented to person, place, and time.      Motor: Weakness (left hemiparesis) present.   Psychiatric:         Mood and Affect: Mood normal.          Lab Results   Component Value Date    WBC 8.05 05/06/2024    HGB 12.3 05/06/2024    HCT 37.7 05/06/2024    MCV 97 05/06/2024     05/06/2024     Lab Results   Component Value Date    SODIUM 141 05/06/2024    K 3.6 05/06/2024     05/06/2024    CO2 29 05/06/2024    BUN 17 05/06/2024    CREATININE 1.05 05/06/2024    GLUC 151 (H) 05/06/2024    CALCIUM 8.5 05/06/2024 "     Lab Results   Component Value Date    INR 0.92 04/20/2024    INR 0.98 08/30/2023    INR 1.06 03/24/2023    PROTIME 12.6 04/20/2024    PROTIME 13.2 08/30/2023    PROTIME 14.0 03/24/2023           Current Facility-Administered Medications:     acetaminophen (TYLENOL) tablet 975 mg, 975 mg, Oral, Q8H PRN, Jenise Dawson PA-C    aluminum-magnesium hydroxide-simethicone (MAALOX) oral suspension 30 mL, 30 mL, Oral, Q6H PRN, Jenise Dawson PA-C    amLODIPine (NORVASC) tablet 10 mg, 10 mg, Oral, Daily, Jenise Dawson PA-C, 10 mg at 05/09/24 0906    atorvastatin (LIPITOR) tablet 40 mg, 40 mg, Oral, After Dinner, Jenise Dawson PA-C, 40 mg at 05/08/24 1701    bisacodyl (DULCOLAX) rectal suppository 10 mg, 10 mg, Rectal, Daily PRN, Jenise Dawson PA-C    cloBAZam (ONFI) tablet 5 mg, 5 mg, Oral, BID, Jenise Dawson PA-C, 5 mg at 05/09/24 0907    dexamethasone (DECADRON) tablet 4 mg, 4 mg, Oral, Daily, Jenise Dawson PA-C, 4 mg at 05/09/24 0907    dextromethorphan-guaiFENesin (ROBITUSSIN DM) oral syrup 10 mL, 10 mL, Oral, Q4H PRN, Corina Arita MD    docusate sodium (COLACE) capsule 100 mg, 100 mg, Oral, BID PRN, Corina Arita MD    enoxaparin (LOVENOX) subcutaneous injection 40 mg, 40 mg, Subcutaneous, Daily, Corina Arita MD, 40 mg at 05/09/24 0906    lacosamide (VIMPAT) tablet 200 mg, 200 mg, Oral, Q12H LEN, Jenise Dawson PA-C, 200 mg at 05/09/24 0906    levETIRAcetam (KEPPRA) tablet 2,000 mg, 2,000 mg, Oral, Q12H LEN, Jenise Dawson PA-C, 2,000 mg at 05/09/24 0911    losartan (COZAAR) tablet 50 mg, 50 mg, Oral, Daily, Jenise Dawson PA-C, 50 mg at 05/09/24 0906    ondansetron (ZOFRAN-ODT) dispersible tablet 4 mg, 4 mg, Oral, Q6H PRN, Jenise Dawson PA-C    pantoprazole (PROTONIX) EC tablet 40 mg, 40 mg, Oral, Early Morning, Jenise Dawson PA-C, 40 mg at 05/09/24 0556    polyethylene glycol  (MIRALAX) packet 17 g, 17 g, Oral, Daily PRN, Corina Arita MD    pramipexole (MIRAPEX) tablet 0.25 mg, 0.25 mg, Oral, TID, Jenise Dawson PA-C, 0.25 mg at 05/09/24 0907    Past Medical History:   Diagnosis Date    Brain compression (HCC) 03/20/2023    Brain mass 03/20/2023    Cancer (HCC) 2001    Receint lung and brain lesions and prostate cancer in 2001    Cerebral edema (HCC) 03/20/2023    Hypertension     Prostate cancer (HCC) 2001    Rectal bleeding     Stroke (HCC)     2011         Patient Active Problem List    Diagnosis Date Noted    Seizure disorder (HCC) 05/02/2024    Metastatic adenocarcinoma (HCC) 2023    Left hemiparesis (HCC) 05/02/2024    Brain mass 03/20/2023    Sore throat 05/08/2024    Frontal mass of brain 05/02/2024    Pseudobulbar affect 05/02/2024    Adjustment disorder 05/02/2024    Constipation by delayed colonic transit 04/25/2024    Seizure-like activity (HCC) 04/23/2024    Generalized weakness 04/20/2024    Tremors of nervous system 04/20/2024    History of CVA (cerebrovascular accident) 06/22/2023    Chemotherapy-induced neutropenia (HCC) 05/03/2023    Encounter for central line care 05/01/2023    Carcinoma of right lung (HCC) 04/13/2023    Lung nodule 03/21/2023    Hypercholesterolemia 09/20/2021    Essential hypertension 09/11/2020    Annual physical exam 09/11/2020    Negative depression screening 02/24/2020    Overweight (BMI 25.0-29.9) 02/24/2020          Corina Arita MD    I have spent a total time of 40 minutes on 05/09/24 in caring for this patient including Impressions, Counseling / Coordination of care, Documenting in the medical record, and Communicating with other healthcare professionals .      ** Please Note:  voice to text software may have been used in the creation of this document. Although proof errors in transcription or interpretation are a potential of such software**

## 2024-05-09 NOTE — NURSING NOTE
Assist of 1-2 with transfers this shift.  Mod assist with toileting.  1 large soft formed BM with voiding.    Appetite good.  Tolerating therapy.  Left sided weakness.  Team made aware of swelling noted of left lower leg and foot. NV check stable.  Same plan of care continued.  Safety maintained and callbell within reach at all times.

## 2024-05-10 PROBLEM — R60.0 BILATERAL EDEMA OF LOWER EXTREMITY: Status: ACTIVE | Noted: 2024-05-10

## 2024-05-10 PROCEDURE — 97110 THERAPEUTIC EXERCISES: CPT

## 2024-05-10 PROCEDURE — 99232 SBSQ HOSP IP/OBS MODERATE 35: CPT | Performed by: PHYSICAL MEDICINE & REHABILITATION

## 2024-05-10 PROCEDURE — 97537 COMMUNITY/WORK REINTEGRATION: CPT

## 2024-05-10 PROCEDURE — 97542 WHEELCHAIR MNGMENT TRAINING: CPT

## 2024-05-10 PROCEDURE — 97530 THERAPEUTIC ACTIVITIES: CPT

## 2024-05-10 PROCEDURE — 97112 NEUROMUSCULAR REEDUCATION: CPT

## 2024-05-10 PROCEDURE — 92507 TX SP LANG VOICE COMM INDIV: CPT | Performed by: NURSE PRACTITIONER

## 2024-05-10 PROCEDURE — 97116 GAIT TRAINING THERAPY: CPT

## 2024-05-10 RX ORDER — SODIUM CHLORIDE 9 MG/ML
20 INJECTION, SOLUTION INTRAVENOUS ONCE
Status: CANCELLED | OUTPATIENT
Start: 2024-05-15

## 2024-05-10 RX ORDER — SODIUM CHLORIDE 9 MG/ML
20 INJECTION, SOLUTION INTRAVENOUS ONCE
OUTPATIENT
Start: 2024-05-29

## 2024-05-10 RX ADMIN — LACOSAMIDE 200 MG: 150 TABLET ORAL at 08:09

## 2024-05-10 RX ADMIN — LACOSAMIDE 200 MG: 150 TABLET ORAL at 21:07

## 2024-05-10 RX ADMIN — PRAMIPEXOLE DIHYDROCHLORIDE 0.25 MG: 0.12 TABLET ORAL at 08:09

## 2024-05-10 RX ADMIN — AMLODIPINE BESYLATE 10 MG: 10 TABLET ORAL at 08:09

## 2024-05-10 RX ADMIN — DEXAMETHASONE 4 MG: 4 TABLET ORAL at 08:09

## 2024-05-10 RX ADMIN — ATORVASTATIN CALCIUM 40 MG: 40 TABLET, FILM COATED ORAL at 17:20

## 2024-05-10 RX ADMIN — PANTOPRAZOLE SODIUM 40 MG: 40 TABLET, DELAYED RELEASE ORAL at 06:22

## 2024-05-10 RX ADMIN — PRAMIPEXOLE DIHYDROCHLORIDE 0.25 MG: 0.12 TABLET ORAL at 21:07

## 2024-05-10 RX ADMIN — LEVETIRACETAM 2000 MG: 500 TABLET, FILM COATED ORAL at 21:07

## 2024-05-10 RX ADMIN — CLOBAZAM 5 MG: 10 TABLET ORAL at 17:20

## 2024-05-10 RX ADMIN — LOSARTAN POTASSIUM 50 MG: 50 TABLET, FILM COATED ORAL at 08:09

## 2024-05-10 RX ADMIN — PRAMIPEXOLE DIHYDROCHLORIDE 0.25 MG: 0.12 TABLET ORAL at 17:20

## 2024-05-10 RX ADMIN — LEVETIRACETAM 2000 MG: 500 TABLET, FILM COATED ORAL at 08:09

## 2024-05-10 RX ADMIN — CLOBAZAM 5 MG: 10 TABLET ORAL at 08:09

## 2024-05-10 RX ADMIN — ENOXAPARIN SODIUM 40 MG: 40 INJECTION SUBCUTANEOUS at 08:09

## 2024-05-10 NOTE — PLAN OF CARE
Problem: Prexisting or High Potential for Compromised Skin Integrity  Goal: Skin integrity is maintained or improved  Description: INTERVENTIONS:  - Identify patients at risk for skin breakdown  - Assess and monitor skin integrity  - Assess and monitor nutrition and hydration status  - Monitor labs   - Assess for incontinence   - Turn and reposition patient  - Assist with mobility/ambulation  - Relieve pressure over bony prominences  - Avoid friction and shearing  - Provide appropriate hygiene as needed including keeping skin clean and dry  - Evaluate need for skin moisturizer/barrier cream  - Collaborate with interdisciplinary team   - Patient/family teaching  - Consider wound care consult   Outcome: Progressing     Problem: PAIN - ADULT  Goal: Verbalizes/displays adequate comfort level or baseline comfort level  Description: Interventions:  - Encourage patient to monitor pain and request assistance  - Assess pain using appropriate pain scale  - Administer analgesics based on type and severity of pain and evaluate response  - Implement non-pharmacological measures as appropriate and evaluate response  - Consider cultural and social influences on pain and pain management  - Notify physician/advanced practitioner if interventions unsuccessful or patient reports new pain  Outcome: Progressing     Problem: INFECTION - ADULT  Goal: Absence or prevention of progression during hospitalization  Description: INTERVENTIONS:  - Assess and monitor for signs and symptoms of infection  - Monitor lab/diagnostic results  - Monitor all insertion sites, i.e. indwelling lines, tubes, and drains  - Monitor endotracheal if appropriate and nasal secretions for changes in amount and color  - Joes appropriate cooling/warming therapies per order  - Administer medications as ordered  - Instruct and encourage patient and family to use good hand hygiene technique  - Identify and instruct in appropriate isolation precautions for  identified infection/condition  Outcome: Progressing  Goal: Absence of fever/infection during neutropenic period  Description: INTERVENTIONS:  - Monitor WBC    Outcome: Progressing     Problem: SAFETY ADULT  Goal: Patient will remain free of falls  Description: INTERVENTIONS:  - Educate patient/family on patient safety including physical limitations  - Instruct patient to call for assistance with activity   - Consult OT/PT to assist with strengthening/mobility   - Keep Call bell within reach  - Keep bed low and locked with side rails adjusted as appropriate  - Keep care items and personal belongings within reach  - Initiate and maintain comfort rounds  - Make Fall Risk Sign visible to staff  - Apply yellow socks and bracelet for high fall risk patients  - Consider moving patient to room near nurses station  Outcome: Progressing  Goal: Maintain or return to baseline ADL function  Description: INTERVENTIONS:  -  Assess patient's ability to carry out ADLs; assess patient's baseline for ADL function and identify physical deficits which impact ability to perform ADLs (bathing, care of mouth/teeth, toileting, grooming, dressing, etc.)  - Assess/evaluate cause of self-care deficits   - Assess range of motion  - Assess patient's mobility; develop plan if impaired  - Assess patient's need for assistive devices and provide as appropriate  - Encourage maximum independence but intervene and supervise when necessary  - Involve family in performance of ADLs  - Assess for home care needs following discharge   - Consider OT consult to assist with ADL evaluation and planning for discharge  - Provide patient education as appropriate  Outcome: Progressing  Goal: Maintains/Returns to pre admission functional level  Description: INTERVENTIONS:  - Perform AM-PAC 6 Click Basic Mobility/ Daily Activity assessment daily.  - Set and communicate daily mobility goal to care team and patient/family/caregiver.   - Collaborate with rehabilitation  services on mobility goals if consulted  - Perform Range of Motion 3 times a day.  - Reposition patient every 2 hours.  - Dangle patient 3 times a day  - Stand patient 5 times a day  - Ambulate patient 3 times a day  - Out of bed to chair 3 times a day   - Out of bed for meals 3 times a day  - Out of bed for toileting  - Record patient progress and toleration of activity level   Outcome: Progressing     Problem: DISCHARGE PLANNING  Goal: Discharge to home or other facility with appropriate resources  Description: INTERVENTIONS:  - Identify barriers to discharge w/patient and caregiver  - Arrange for needed discharge resources and transportation as appropriate  - Identify discharge learning needs (meds, wound care, etc.)  - Arrange for interpretive services to assist at discharge as needed  - Refer to Case Management Department for coordinating discharge planning if the patient needs post-hospital services based on physician/advanced practitioner order or complex needs related to functional status, cognitive ability, or social support system  Outcome: Progressing     Problem: NEUROSENSORY - ADULT  Goal: Achieves stable or improved neurological status  Description: INTERVENTIONS  - Monitor and report changes in neurological status  - Monitor vital signs such as temperature, blood pressure, glucose, and any other labs ordered   - Initiate measures to prevent increased intracranial pressure  - Monitor for seizure activity and implement precautions if appropriate      Outcome: Progressing  Goal: Remains free of injury related to seizures activity  Description: INTERVENTIONS  - Maintain airway, patient safety  and administer oxygen as ordered  - Monitor patient for seizure activity, document and report duration and description of seizure to physician/advanced practitioner  - If seizure occurs,  ensure patient safety during seizure  - Reorient patient post seizure  - Seizure pads on all 4 side rails  - Instruct  patient/family to notify RN of any seizure activity including if an aura is experienced  - Instruct patient/family to call for assistance with activity based on nursing assessment  - Administer anti-seizure medications if ordered    Outcome: Progressing  Goal: Achieves maximal functionality and self care  Description: INTERVENTIONS  - Monitor swallowing and airway patency with patient fatigue and changes in neurological status  - Encourage and assist patient to increase activity and self care.   - Encourage visually impaired, hearing impaired and aphasic patients to use assistive/communication devices  Outcome: Progressing     Problem: Nutrition/Hydration-ADULT  Goal: Nutrient/Hydration intake appropriate for improving, restoring or maintaining nutritional needs  Description: Monitor and assess patient's nutrition/hydration status for malnutrition. Collaborate with interdisciplinary team and initiate plan and interventions as ordered.  Monitor patient's weight and dietary intake as ordered or per policy. Utilize nutrition screening tool and intervene as necessary. Determine patient's food preferences and provide high-protein, high-caloric foods as appropriate.     INTERVENTIONS:  - Monitor oral intake, urinary output, labs, and treatment plans  - Assess nutrition and hydration status and recommend course of action  - Evaluate amount of meals eaten  - Assist patient with eating if necessary   - Allow adequate time for meals  - Recommend/ encourage appropriate diets, oral nutritional supplements, and vitamin/mineral supplements  - Provide specific nutrition/hydration education as appropriate  - Include patient/family/caregiver in decisions related to nutrition  Outcome: Progressing

## 2024-05-10 NOTE — ASSESSMENT & PLAN NOTE
Stable; if worsens or becomes painful obtain BLE venous doppler   1+ Pitting edema bilaterally likely secondary to increased dependent position.    On Lovenox for DVT prophylaxis   TEDS daily  Elevate limbs more

## 2024-05-10 NOTE — PROGRESS NOTES
"Progress Note - Paras Pitts 68 y.o. male MRN: [id number]    Unit/Bed#: [facility] 213-02 Encounter: [id number]        Subjective:   Patient seen and examined at bedside after reviewing the chart and discussing the case with the caring staff.      Patient examined at bedside.  Patient has mild cough. Requesting Robitussin last night which relieved symptoms. Patient believes wife gave him her cold. Denies SOB, any other symptoms.    Physical Exam   Vitals: Blood pressure 147/81, pulse 69, temperature 98.3 °F (36.8 °C), temperature source Temporal, resp. rate 16, height 5' 9\" (1.753 m), weight 91.6 kg (201 lb 15.1 oz), SpO2 96%.,Body mass index is 29.82 kg/m².  Constitutional: Patient in no acute distress.  HEENT: PERR, EOMI, MMM.  Cardiovascular: Normal rate and regular rhythm.    Pulmonary/Chest: Effort normal and breath sounds normal. Lungs clear.  Abdomen: Soft, + BS, NT.    Assessment/Plan:  Paras Pitts is a(n) 68 y.o. male with tremors of the nervous system.     Cardiac Hx w/ HTN, HLD, hx of CVA.  Continue amlodipine 10 mg daily, Losartan 50 mg daily, atorvastatin 40 mg daily.  Type 2 diabetes mellitus.  Hgb A1c 6.7% on 5/3/24.  Carb controlled diet.   GERD.  Continue Protonix 40 mg daily.  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  Stage [place].  S/p robotic converted to right thoracotomy and right upper lobectomy on 9/1/2023. S/p bronchoscopy with EBUS at [facility] on 4/16/2024.  Continue Decadron 4 mg daily.  Patient [facility] 4/[facility].  Further radiation treatment in 1-2 weeks per oncologist, Dr. Contreras.  Pain and bowel regimen. As per physiatry.  Cough. Robitussin as needed.   Tremors of the nervous system.  Continue Keppra twice daily, Vimpat 100 mg twice daily, clobazam 0.5 mg twice daily.  Follow up in 4 weeks with epilepsy attending.  Patient receiving intensive PT OT as per physiatry.     Anticipated date of discharge.  Wednesday, 5/22/2024    The patient was discussed with Dr." Raymond and he is in agreement with the above note.

## 2024-05-10 NOTE — PROGRESS NOTES
05/10/24 1232   Pain Assessment   Pain Assessment Tool 0-10   Pain Score No Pain   Restrictions/Precautions   Precautions Bed/chair alarms;Cognitive;Fall Risk;Multiple lines;Supervision on toilet/commode;Seizure  (port R chest wall for  chemo)   Weight Bearing Restrictions No   ROM Restrictions No   Exercise Tools   Hand Gripper 5# digiflex 40 reps each hand   Other Exercise Tool 1 Towel slides L hand 20 reps each plane of motion with minimal assist from R hand   Neuromuscular Education   Functional Movement Patterns Saebo MAS tension 3.5 LUE completed 3x10 sh flex/ext, sh protraction/retraction, and sh abd/add. Pt operated various locks, keys, and knobs on fine motor board. Pt created a design out of PVC pipes and removed pipes.   Cognition   Overall Cognitive Status Impaired   Arousal/Participation Alert;Responsive;Cooperative   Attention Attends with cues to redirect   Orientation Level Oriented X4   Memory Decreased short term memory;Decreased recall of precautions   Following Commands Follows one step commands with increased time or repetition   Activity Tolerance   Activity Tolerance Patient tolerated treatment well   Assessment   Treatment Assessment Pt agreeable to skilled OT session this PM, focusing on LUE strength, ROM, and coordination, details listed in respective sections. Pt recieved in w/c. Pt continues to take increased time to complete activites. Pt offered rest breaks during session when showing fatigue and refused. Pt educated on energy conservation and then was more receptive to taking rest breaks. Pt continues with barriers of decreased strength throughout but especially L side s/p previous CVA, decreased balance, impaired coordination L side, impaired safety awareness, problem solving, and attention, and decreased activity tolerance; all affect independence in self care and fxl transfers. Pt would benefit from continued skilled OT services in order to address listed barriers and prepare for  safe d/c.   Prognosis Good   Problem List Decreased strength;Decreased range of motion;Decreased endurance;Impaired balance;Decreased cognition;Decreased safety awareness;Pain;Impaired tone   Plan   Treatment/Interventions ADL retraining;Functional transfer training;Therapeutic exercise;Endurance training;Cognitive reorientation;Patient/family training;Equipment eval/education;Bed mobility;Compensatory technique education;OT   Progress Progressing toward goals   OT Therapy Minutes   OT Time In 1232   OT Time Out 1411   OT Total Time (minutes) 99   OT Mode of treatment - Individual (minutes) 99   Therapy Time missed   Time missed? No

## 2024-05-10 NOTE — PROGRESS NOTES
05/10/24 0850   Pain Assessment   Pain Assessment Tool 0-10   Pain Score No Pain   Restrictions/Precautions   Precautions Bed/chair alarms;Cognitive;Fall Risk;Seizure;Supervision on toilet/commode   Braces or Orthoses   (DF assist wrap)   Cognition   Overall Cognitive Status Impaired   Arousal/Participation Alert;Responsive;Cooperative   Attention Attends with cues to redirect   Orientation Level Oriented X4   Memory Decreased short term memory;Decreased recall of precautions   Following Commands Follows one step commands with increased time or repetition   Sit to Stand   Type of Assistance Needed Physical assistance;Verbal cues   Physical Assistance Level 26%-50%   Comment min A using bed rail to completed LB hygiene and dressing; min-mod A STS with PFRW; assist with LUE placement on platform   Sit to Stand CARE Score 3   Walk 10 Feet   Type of Assistance Needed Physical assistance;Verbal cues   Physical Assistance Level Total assistance   Comment min-mod A with PFRW level surfaces; second person for wheelchair follow   Walk 10 Feet CARE Score 1   Walk 50 Feet with Two Turns   Type of Assistance Needed Physical assistance;Verbal cues   Physical Assistance Level Total assistance   Comment min-mod A with PFRW level surfaces; second person for wheelchair follow   Walk 50 Feet with Two Turns CARE Score 1   Ambulation   Primary Mode of Locomotion Prior to Admission Walk   Distance Walked (feet) 52 ft  (47' 43' 52')   Assist Device Platform;Roller Walker   Gait Pattern Inconsistant Chioma;Slow Chioma;Decreased foot clearance;R hemiparesis;L foot drag;Narrow ABHINAV;Step to;Step through;Improper weight shift   Limitations Noted In Balance;Coordination;Device Management;Endurance;Safety;Strength;Speed   Provided Assistance with: Balance;Trunk Support;Limb Advancement;Limb Stabilization;Weight Shift   Walk Assist Level Moderate Assist;Minimum Assist;Chair Follow   Findings min-mod A with PFRW level surfaces; second person  for wheelchair follow; increased time to complete; LLE fatigues quickly   Does the patient walk? 2. Yes   Wheel 50 Feet with Two Turns   Type of Assistance Needed Physical assistance;Verbal cues   Physical Assistance Level 26%-50%   Comment S/min A level and unlevel surfaces with cues for sequence   Wheel 50 Feet with Two Turns CARE Score 3   Wheel 150 Feet   Type of Assistance Needed Physical assistance;Verbal cues   Physical Assistance Level 26%-50%   Comment S/min A level and unlevel surfaces with cues for sequence   Wheel 150 Feet CARE Score 3   Wheelchair mobility   Does the patient use a wheelchair? 1. Yes   Type of Wheelchair Used 1. Manual   Method Right upper extremity;Right lower extremity   Assistance Provided For Locking Brakes;Obstacles;Remove Leg Rest;Replace Leg Rest   Distance Level Surface (feet) 199 ft  (199' 154')   Distance Wheeled 3% Grade 12 ft   Findings S/min A level and unlevel surfaces with cues for sequence   Curb or Single Stair   Style negotiated Single stair   Type of Assistance Needed Physical assistance;Verbal cues   Physical Assistance Level Total assistance   Comment mod-max A x 1 plus min A of another (for L foot placement/stabilization) up steps forward and down backwards   1 Step (Curb) CARE Score 1   Stairs   Type Stairs   # of Steps 3   Weight Bearing Precautions WBAT   Assist Devices Bilateral Rail   Findings mod-max A x 1 plus min A of another (for L foot placement/stabilization) up steps forward and down backwards   Therapeutic Interventions   Strengthening seated ther ex RLE AROM; LLE AAROM   Balance gait and transfer training   Other wheelchair mobility; stair negotiation   Assessment   Treatment Assessment Patient agreeable to and motivated for therapy session.    No pain reported.   Cues for general safety as well as task sequence.  Increased fatigue noted t/o therapy session, requiring increased assistance with stair negotiation.  Patient with increased gait distance  today with less assistance to advance LLE through gait cycle.   Completed ther ex for general LE strengthening; gait and transfer training focusing on sequence and technique for improved balance and safety with functional mobility usng walker.  Negotiated steps with 2 handrails and A x 2 going up forward and down backwards.   Increased assistance required to clear LLE  to step up/down step.   Patient propels wheelchair S/min A over level and unlevel surfaces.    Patient appropriate for concurrent therapy to increase motivation and encouragement among pts with similar deficits while completing therapy session.   PT Barriers   Physical Impairment Decreased strength;Decreased range of motion;Decreased endurance;Impaired balance;Decreased mobility;Decreased coordination;Decreased safety awareness;Impaired tone   Functional Limitation Wheelchair management;Walking;Transfers;Standing;Stair negotiation;Ramp negotiation;Car transfers   Plan   Treatment/Interventions Functional transfer training;LE strengthening/ROM;Elevations;Therapeutic exercise;Gait training  (wheelchair mobility)   Progress Progressing toward goals   PT Therapy Minutes   PT Time In 0850   PT Time Out 1020   PT Total Time (minutes) 90   PT Mode of treatment - Individual (minutes) 60   PT Mode of treatment - Concurrent (minutes) 30   PT Mode of treatment - Group (minutes) 0   PT Mode of treatment - Co-treat (minutes) 0   PT Mode of Treatment - Total time(minutes) 90 minutes   PT Cumulative Minutes 696   Therapy Time missed   Time missed? No

## 2024-05-10 NOTE — PROGRESS NOTES
05/10/24 1100   Pain Assessment   Pain Assessment Tool 0-10   Pain Score No Pain   Restrictions/Precautions   Precautions Bed/chair alarms;Cognitive;Fall Risk;Supervision on toilet/commode   Comprehension   Comprehension (FIM) 6 - Has only MILD difficulty with complex/abstract info   Expression   Expression (FIM) 5 - Needs help/cues only RARELY (< 10% of the time)   Social Interaction   Social Interaction (FIM) 6 - Interacts appropriately with others BUT requires extra  time   Problem Solving   Problem solving (FIM) 5 - Solves basic problems 90% of time   Memory   Memory (FIM) 5 - Needs cueing reminders <10%   Speech/Language/Cognition Assessmetn   Treatment Assessment Word finding x1 across 10:00 conversation, dasia retrieved with slight time delay. Written fluency: animals in 1:00 wrote down 9 items. Tools wrote down 9 in 3:00, drinks wrote down 13 in 3:00. Deductive reasoning puzzle 3x3, 9clues  solved in 10:00 with 3 errors fixed with min cue.   SLP Therapy Minutes   SLP Time In 1100   SLP Time Out 1200   SLP Total Time (minutes) 60   SLP Mode of treatment - Individual (minutes) 60   SLP Mode of treatment - Concurrent (minutes) 0   SLP Mode of treatment - Group (minutes) 0   SLP Mode of treatment - Co-treat (minutes) 0   SLP Mode of Treatment - Total time(minutes) 60 minutes   SLP Cumulative Minutes 425

## 2024-05-10 NOTE — PROGRESS NOTES
PM&R PROGRESS NOTE:  Paras Pitts 68 y.o. male MRN: [id number]  Unit/Bed#: -02 Encounter: [id number]        Rehab Diagnosis: Impairment of mobility, safety, Activities of Daily Living (ADLs), and cognitive/communication skills due to Brain Dysfunction:  02.1  Non-Traumatic  Etiologic Diagnosis: New onset epilepsy/New onset seizure disorder in the setting new right frontal lobe vasogenic edema and radiation necrosis related to metastatic adenocarcinoma right frontal brain mass    HPI: Paras Pitts is a 68 y.o. 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  CT head showing right frontal lobe vasogenic edema with known underlying lesion.  MRI brain showing a right superior frontal mass resection and chemoradiation with unchanged linear enhancement along the surgical cavity compared to 3/26/2024 favoring radiation necrosis.  Patient seen by neurology.  Video EEG showing: Early in recording there was frequent or nearly continuous focal motor seizure and later there were two subclinical right fronto central electrographic seizures.  Patient placed on the following AED regimen: Keppra 2 g twice daily, Vimpat 200 mg twice daily, Onfi 5 mg twice daily.  Of note patient was significantly sedated with Ativan.   Patient to follow-up with epileptologist as an outpatient.  Patient was seen by radiation oncology regarding edema and  radiation necrosis and recommended to increase dexamethasone to 4 mg twice daily.  Patient is under the care of Dr. Langford for medical oncology and is to begin Avastin, chemotherapy radiation therapy for his recurrent mediastinal disease post rehabilitation.  After medical stabilization, patient found to have acute functional deficits from his mobility and self-care, therefore admitted to [facility] for acute inpatient rehabilitation.    SUBJECTIVE: Patient seen face to face.  No acute issues overnight.  Slept well.  +BM yesterday.  Can fluctuate in transfers based on fatigue.  Seeing more movement in left leg.  + Bilateral LE edema - educated patient to elevate legs more as frequently in dependent position.  TEDS to be applied daily.     Infusion center to set up chemo treatment for mid week next week x 1 treatment.    Needs CBC, CMP, UA on Monday    ASSESSMENT: Stable, progressing      PLAN:    Rehabilitation  Functional deficits:  Left hemiparesis, mild left impaired sensation in hand and foot related to previous stroke,, left inattention, impaired balance, impaired mobility, self care   Continue current rehabilitation plan of care to maximize function.  Left multipodus boot ordered to prevent plantar flexion contracture    Estimated Discharge: RETEAM.  ELOS 2-3 weeks    Current Function:  Physical Therapy Occupational Therapy Speech Therapy   Weight Bearing Status: Weight Bearing as Tolerated  Transfers:  (mod A STS, mod A sit pivot no AD, MOD-max A SPT using grab bar)  Bed Mobility: Moderate Assistance, Maximum Assistance  Amulation Distance (ft): 33 feet  Ambulation: Moderate Assistance (with second person for wheelchair follow)  Assistive Device for Ambulation:  (PFRW)  Wheelchair Mobility Distance: 197 ft  Wheelchair Mobility: Minimal Assistance  Number of Stairs:  (TBA)  Discharge Recommendations: Home with:  DC Home with:: 24 Hour Assisteance, Family Support, First Floor Setup, Home Physical Therapy    Eating: Supervision  Grooming: Supervision  Bathing: Assist of 2  Bathing: Assist of 2  Upper Body Dressing: Maximum Assistance, Moderate Assistance  Lower Body Dressing: Assist of 2  Toileting: Total Assistance (set-up with urinal)  Tub/Shower Transfer:  (TBA)  Toilet Transfer: Assist of 2  Cognition: Exceptions to WNL  Cognition: Decreased Safety, Decreased Comprehension, Decreased Attention  Orientation: Person, Place, Time, Situation   Mode of Communication: Verbal  Cognition: Exceptions to WNL  Cognition: Decreased Memory, Decreased Executive Functions, Decreased Attention  Orientation: Person, Place, Time, Situation  Diet Recommendations: Regular Diet, Thin  Discharge Recommendations: Home with:  DC Home with:: Family Support, Outpatient Speech Therapy       DVT prophylaxis  Continue SQ Lovenox     Bladder plan  Continent and voiding     Bowel plan  Continent  Last BM 5/9/2024      * Seizure disorder (HCC)  Assessment & Plan  New onset seizures presenting for 2024  CT head showing right frontal lobe vasogenic edema with known underlying lesion.    MRI brain showing a right superior frontal mass resection and chemoradiation with unchanged linear enhancement along the surgical cavity compared to 3/26/2024 favoring radiation necrosis.    Patient seen by neurology.    Video EEG showing: Early in recording there was frequent or nearly continuous focal motor seizure and later there were two subclinical right fronto central electrographic seizures.    Patient placed on the following AED regimen: Keppra 2 g twice daily, Vimpat 200 mg twice daily, Onfi 5 mg twice daily.  *Of note patient was significantly sedated with Ativan.  Continue seizure precautions     Patient to follow-up with neurology-epileptologist as an outpatient.    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  Dexamethasone was increased to 4 mg twice daily.    Patient is under the care of Dr. Langford for medical oncology and is to begin Avastin, chemotherapy radiation therapy for his recurrent mediastinal disease post rehabilitation.  First infusion set for 5/15/2024.   Infusion center to set up chemo treatment   Needs CBC, CMP, UA on Monday - orders placed    Left hemiparesis (HCC)  Assessment & Plan  Worsening left hemiparesis likely related to recent radiation necrosis and vasogenic edema, seizures  Continue Decadron 4 mg twice daily  Continue acute rehabilitation program to maximize function      Brain mass  Assessment & Plan  Patient with known metastatsis to right frontal lobe s/p right frontal craniotomy in March 2023  Residual mild left hemiparesis but remained highly functional and independent.  Patient status post chemotherapy radiation SRT, immunotherapy  Follows with Dr. Langford    Bilateral edema of lower extremity  Assessment & Plan  1+ Pitting edema bilaterally likely secondary to increased dependent position.  Consider venous dopplers if edema continues/increases.   TEDS daily  Elevate limbs more    Sore throat  Assessment & Plan  PRN Robitussin ordered    Adjustment disorder  Assessment & Plan  May benefit from a neuropsychology consultation if patient is agreeable    Pseudobulbar affect  Assessment & Plan  Mood is much improved without emotional lability noted (week of 5/6/2024)  On admission at times was tearful and emotionally labile  May benefit from a future SSRI if continues      History of CVA (cerebrovascular accident)  Assessment & Plan  Patient with ischemic CVA in 2011  Resultant mild left hemiparesis    Hypercholesterolemia  Assessment &  "Plan  Continue Lipitor 40 mg daily (home dose)    Essential hypertension  Assessment & Plan  Continue Norvasc 10 mg daily and losartan 50 mg daily (home dose)  Monitor BP and heart rate during rest and with activity  Internal medicine managing    Overweight (BMI 25.0-29.9)  Assessment & Plan  Dietary and lifestyle changes when able        Appreciate IM consultants medical co-management.  Labs, medications, and imaging personally reviewed.      ROS:  A ten point review of systems was completed on 05/10/24 and pertinent positives are listed in subjective section. All other systems reviewed were negative.     OBJECTIVE:   /81 (BP Location: Right arm)   Pulse 69   Temp 98.3 °F (36.8 °C) (Temporal)   Resp 16   Ht 5' 9\" (1.753 m)   Wt 91.6 kg (201 lb 15.1 oz)   SpO2 96%   BMI 29.82 kg/m²     Physical Exam  Vitals and nursing note reviewed.   Constitutional:       General: He is not in acute distress.  HENT:      Head: Normocephalic and atraumatic.      Nose: Nose normal.      Mouth/Throat:      Mouth: Mucous membranes are moist.   Eyes:      Conjunctiva/sclera: Conjunctivae normal.   Cardiovascular:      Rate and Rhythm: Normal rate and regular rhythm.      Pulses: Normal pulses.   Pulmonary:      Effort: Pulmonary effort is normal.      Breath sounds: Normal breath sounds. No wheezing or rales.   Abdominal:      General: Bowel sounds are normal. There is no distension.      Palpations: Abdomen is soft.      Tenderness: There is no abdominal tenderness.   Musculoskeletal:         General: Swelling present.      Cervical back: Neck supple.      Right lower leg: Edema (1+) present.      Left lower leg: Edema (1-2+) present.   Skin:     General: Skin is warm.   Neurological:      Mental Status: He is alert and oriented to person, place, and time.      Motor: Weakness (left hemiparesis LLE> LUE.  LUE graded as a 4-/5.  LLE 3-/5 HF, 2/5 KF, 3-/5 KE, 1/5 DF) present.   Psychiatric:         Mood and Affect: Mood " normal.          Lab Results   Component Value Date    WBC 8.05 05/06/2024    HGB 12.3 05/06/2024    HCT 37.7 05/06/2024    MCV 97 05/06/2024     05/06/2024     Lab Results   Component Value Date    SODIUM 141 05/06/2024    K 3.6 05/06/2024     05/06/2024    CO2 29 05/06/2024    BUN 17 05/06/2024    CREATININE 1.05 05/06/2024    GLUC 151 (H) 05/06/2024    CALCIUM 8.5 05/06/2024     Lab Results   Component Value Date    INR 0.92 04/20/2024    INR 0.98 08/30/2023    INR 1.06 03/24/2023    PROTIME 12.6 04/20/2024    PROTIME 13.2 08/30/2023    PROTIME 14.0 03/24/2023           Current Facility-Administered Medications:     acetaminophen (TYLENOL) tablet 975 mg, 975 mg, Oral, Q8H PRN, Jenise Dawson PA-C    aluminum-magnesium hydroxide-simethicone (MAALOX) oral suspension 30 mL, 30 mL, Oral, Q6H PRN, Jenise Dawson PA-C    amLODIPine (NORVASC) tablet 10 mg, 10 mg, Oral, Daily, Jenise Dawson PA-C, 10 mg at 05/10/24 0809    atorvastatin (LIPITOR) tablet 40 mg, 40 mg, Oral, After Dinner, Jenise Dawson PA-C, 40 mg at 05/09/24 1738    bisacodyl (DULCOLAX) rectal suppository 10 mg, 10 mg, Rectal, Daily PRN, Jenise Dawson PA-C    cloBAZam (ONFI) tablet 5 mg, 5 mg, Oral, BID, Jenise Dawson PA-C, 5 mg at 05/10/24 0809    dexamethasone (DECADRON) tablet 4 mg, 4 mg, Oral, Daily, Jenise Dawson PA-C, 4 mg at 05/10/24 0809    dextromethorphan-guaiFENesin (ROBITUSSIN DM) oral syrup 10 mL, 10 mL, Oral, Q4H PRN, Corina Arita MD, 10 mL at 05/09/24 2333    docusate sodium (COLACE) capsule 100 mg, 100 mg, Oral, BID PRN, Corina Arita MD    enoxaparin (LOVENOX) subcutaneous injection 40 mg, 40 mg, Subcutaneous, Daily, Corina Arita MD, 40 mg at 05/10/24 0809    lacosamide (VIMPAT) tablet 200 mg, 200 mg, Oral, Q12H LEN, Jenise Dawson PA-C, 200 mg at 05/10/24 0809    levETIRAcetam (KEPPRA) tablet 2,000 mg, 2,000 mg, Oral, Q12H LEN,  Jenise Dawson PA-C, 2,000 mg at 05/10/24 0809    losartan (COZAAR) tablet 50 mg, 50 mg, Oral, Daily, Jenise Dawson PA-C, 50 mg at 05/10/24 0809    ondansetron (ZOFRAN-ODT) dispersible tablet 4 mg, 4 mg, Oral, Q6H PRN, Jenise Dawson PA-C    pantoprazole (PROTONIX) EC tablet 40 mg, 40 mg, Oral, Early Morning, Jenise Dawson PA-C, 40 mg at 05/10/24 0622    polyethylene glycol (MIRALAX) packet 17 g, 17 g, Oral, Daily PRN, Corina Arita MD    pramipexole (MIRAPEX) tablet 0.25 mg, 0.25 mg, Oral, TID, Jenise Dawson PA-C, 0.25 mg at 05/10/24 0809    Past Medical History:   Diagnosis Date    Brain compression (HCC) 03/20/2023    Brain mass 03/20/2023    Cancer (HCC) 2001    Receint lung and brain lesions and prostate cancer in 2001    Cerebral edema (HCC) 03/20/2023    Hypertension     Prostate cancer (HCC) 2001    Rectal bleeding     Stroke (HCC)     2011         Patient Active Problem List    Diagnosis Date Noted    Seizure disorder (HCC) 05/02/2024    Metastatic adenocarcinoma (HCC) 2023    Left hemiparesis (HCC) 05/02/2024    Brain mass 03/20/2023    Bilateral edema of lower extremity 05/10/2024    Sore throat 05/08/2024    Frontal mass of brain 05/02/2024    Pseudobulbar affect 05/02/2024    Adjustment disorder 05/02/2024    Constipation by delayed colonic transit 04/25/2024    Seizure-like activity (HCC) 04/23/2024    Generalized weakness 04/20/2024    Tremors of nervous system 04/20/2024    History of CVA (cerebrovascular accident) 06/22/2023    Chemotherapy-induced neutropenia (HCC) 05/03/2023    Encounter for central line care 05/01/2023    Carcinoma of right lung (HCC) 04/13/2023    Lung nodule 03/21/2023    Hypercholesterolemia 09/20/2021    Essential hypertension 09/11/2020    Annual physical exam 09/11/2020    Negative depression screening 02/24/2020    Overweight (BMI 25.0-29.9) 02/24/2020          Corina Arita MD    I have spent a total time of 45  minutes on 05/10/24 in caring for this patient including Patient and family education, Impressions, Documenting in the medical record, and Communicating with other healthcare professionals .      ** Please Note:  voice to text software may have been used in the creation of this document. Although proof errors in transcription or interpretation are a potential of such software**

## 2024-05-11 PROCEDURE — 97530 THERAPEUTIC ACTIVITIES: CPT

## 2024-05-11 PROCEDURE — 97110 THERAPEUTIC EXERCISES: CPT

## 2024-05-11 PROCEDURE — 97116 GAIT TRAINING THERAPY: CPT

## 2024-05-11 PROCEDURE — 97542 WHEELCHAIR MNGMENT TRAINING: CPT

## 2024-05-11 RX ORDER — BISACODYL 10 MG
10 SUPPOSITORY, RECTAL RECTAL DAILY PRN
Status: DISCONTINUED | OUTPATIENT
Start: 2024-05-11 | End: 2024-05-24 | Stop reason: HOSPADM

## 2024-05-11 RX ADMIN — LOSARTAN POTASSIUM 50 MG: 50 TABLET, FILM COATED ORAL at 08:15

## 2024-05-11 RX ADMIN — LACOSAMIDE 200 MG: 150 TABLET ORAL at 08:16

## 2024-05-11 RX ADMIN — DEXAMETHASONE 4 MG: 4 TABLET ORAL at 08:15

## 2024-05-11 RX ADMIN — CLOBAZAM 5 MG: 10 TABLET ORAL at 08:15

## 2024-05-11 RX ADMIN — ATORVASTATIN CALCIUM 40 MG: 40 TABLET, FILM COATED ORAL at 17:21

## 2024-05-11 RX ADMIN — LEVETIRACETAM 2000 MG: 500 TABLET, FILM COATED ORAL at 20:33

## 2024-05-11 RX ADMIN — ENOXAPARIN SODIUM 40 MG: 40 INJECTION SUBCUTANEOUS at 08:16

## 2024-05-11 RX ADMIN — LACOSAMIDE 200 MG: 150 TABLET ORAL at 20:33

## 2024-05-11 RX ADMIN — AMLODIPINE BESYLATE 10 MG: 10 TABLET ORAL at 08:16

## 2024-05-11 RX ADMIN — PANTOPRAZOLE SODIUM 40 MG: 40 TABLET, DELAYED RELEASE ORAL at 05:56

## 2024-05-11 RX ADMIN — PRAMIPEXOLE DIHYDROCHLORIDE 0.25 MG: 0.12 TABLET ORAL at 20:33

## 2024-05-11 RX ADMIN — GUAIFENESIN AND DEXTROMETHORPHAN 10 ML: 100; 10 SYRUP ORAL at 05:59

## 2024-05-11 RX ADMIN — GUAIFENESIN AND DEXTROMETHORPHAN 10 ML: 100; 10 SYRUP ORAL at 20:40

## 2024-05-11 RX ADMIN — CLOBAZAM 5 MG: 10 TABLET ORAL at 17:21

## 2024-05-11 RX ADMIN — PRAMIPEXOLE DIHYDROCHLORIDE 0.25 MG: 0.12 TABLET ORAL at 08:16

## 2024-05-11 RX ADMIN — PRAMIPEXOLE DIHYDROCHLORIDE 0.25 MG: 0.12 TABLET ORAL at 17:21

## 2024-05-11 RX ADMIN — LEVETIRACETAM 2000 MG: 500 TABLET, FILM COATED ORAL at 08:15

## 2024-05-11 NOTE — NURSING NOTE
Pt awake resting in bed. Pt c/o cough this morning, prn robitussin admin as ordered refer to MAR. Repos self in bed for pressure relief. TEDs  removed HS, b/l legs elevated on pillow. Urinal set up at bedside, staff empties. Declined to wash, wishes to get changed before breakfast. Continuing to monitor and follow plan of care, bed alarm intact for safety.

## 2024-05-11 NOTE — PROGRESS NOTES
Physical Medicine and Rehabilitation Progress Note  Paras Pitts 68 y.o. male MRN: [id number]  Unit/Bed#: [facility] 213-02 Encounter: [id number]      Assessment & Plan:     Decline in ADLs and mobility: Functional assessment - improving         FIM  Care Score  Admit Score Recent Score    Total assist  1-100% or 2p    Tot Bathing, LBD LBD   Max assist 2-51-75%    Sub UBD To hygiene    Mod assist 3- 26-50%  Par     Min assist 3- 25% or < Par     CG assist 4  TA  UBD   Sup/Setup 4-5 Sup     Mod-I/Indep 6 MI      Transfers  Total  Significant-mod assist     Ambulation   Total  Total (Min-mod with 2nd person CF)    WC mob  Mod assist 150 ft  Supervision     Stairs   Total assist/NT      Goal: CGA-supervision for most ADLs and for WC mobility except possible increased assist for LBD/bathing  Major barriers:  Cog impairment, imbalance, deconditioning, risk of complications  Dispo: Home with [facility]OS 21+ days from admission      Cough  Assessment & Plan  Increasing cough; somewhat wet  Comgmt with IM team  Monitor lung exam, vitals with and without activity  Encourage incentive spirometry  Covid/Flu/RSV PCR negative 5/13  Obtain CXR   Mucinex BID and Robitussin DM PRN per IM     * Seizure disorder (HCC)  Assessment & Plan  No evidence of seizures recently    New onset seizures presenting for 2024  CT head showing right frontal lobe vasogenic edema with known underlying lesion.    MRI brain showing a right superior frontal mass resection and chemoradiation with unchanged linear enhancement along the surgical cavity compared to 3/26/2024 favoring radiation necrosis.    Patient seen by neurology.    Video EEG showing: Early in recording there was frequent or nearly continuous focal motor seizure and later there were two subclinical right fronto central electrographic seizures.    Patient placed on the following AED regimen: Keppra 2 g twice daily, Vimpat 200 mg twice daily, Onfi 5 mg twice daily.  *Of note patient was significantly  sedated with Ativan.  (Patient also on Mirapex 0.25mg TID presumable for RLS)  Continue seizure precautions     Patient to follow-up with neurology-epileptologist as an outpatient.    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  Dexamethasone taking 4mg qday per prior providers recently for vasogenic edema (appears to be on 2mg BID prior at home based on chart review)   Patient is under the care of Dr. Langford for medical oncology and is to begin Avastin, chemotherapy radiation therapy for his recurrent mediastinal disease post rehabilitation.  First infusion set for 5/15/2024.   Infusion center to set up chemo treatment   CBC, CMP, UA on Monday 5/13   Essentially unremarkable  Does have recent increased cough - comgmt per IM  Covid/flu/RSV negative 5/13  CXR pending     Brain mass  Assessment & Plan  Patient with known metastatsis to right frontal lobe s/p right frontal craniotomy in March 2023  Dexamethasone taking 4mg qday per prior providers recently for vasogenic edema (appears to be on 2mg BID prior at home based on chart review)   Residual mild left hemiparesis but remained highly functional and independent.  Patient status post chemotherapy radiation SRT, immunotherapy  Follows with Dr. Langford    Cognitive impairment  Assessment & Plan  Did have fall 5/11 when he tried to reach down to  urinal - MD mckenna'isidra without signs of injury and patient continues to deny injury from fall  - High fall precautions with frequent rounding - if increased safety concerns consider q15 checks or 1:1  - MOCA completed 5/9 -  obtain results   - Increased risk of delirium - monitor   - Patient/family/CG teaching   - Optimal Sleep/wake cycle management  - Limit sedating/deliriogenic meds when possible  - Optimal medical, mood, and pain management   - Skilled therapies as outlined   - Compensatory strategies   - Optimize oversight after discharge  - OP follow-up with PCP and neuro      Left hemiparesis (HCC)  Assessment & Plan  Stable   Worsening left hemiparesis likely related to recent radiation necrosis and vasogenic edema, seizures  Continue Decadron 4 mg daily per prior order  Continue acute rehabilitation program to maximize function      History of CVA (cerebrovascular accident)  Assessment & Plan  Patient with ischemic CVA in 2011  Resultant mild left hemiparesis    Bilateral edema of lower extremity  Assessment & Plan  Stable; if worsens or becomes painful obtain BLE venous doppler   1+ Pitting edema bilaterally likely secondary to increased dependent position.    On Lovenox for DVT prophylaxis   TEDS daily  Elevate limbs more    Adjustment disorder  Assessment & Plan  Supportive counseling  Consider neuropsychology consultation if needed     Pseudobulbar affect  Assessment & Plan  Mood is much improved without emotional lability noted (week of 5/6/2024)  On admission at times was tearful and emotionally labile  May benefit from a future SSRI if continues      Sore throat  Assessment & Plan  Improved; Covid/RSV/flu negative 5/13   PRN Robitussin ordered    Hypercholesterolemia  Assessment & Plan  Continue Lipitor 40 mg daily (home dose)    Essential hypertension  Assessment & Plan  Continue Norvasc 10 mg daily and losartan 50 mg daily (home dose)  Monitor BP and heart rate during rest and with activity  Internal medicine managing    Overweight (BMI 25.0-29.9)  Assessment & Plan  Dietary and lifestyle changes when able        Other Medical Issues:  Monitor for     Follow-up providers and other issues to be followed up after  discharge  PCP  Neuro  H-O  Other chronic providers    CODE: Level 1: Full Code per prior providers    Restrictions include:  Fall precautions    Objective:    Allergies per EMR  Diagnostic Studies: Reviewed, no new imaging  XR chest portable    (Results Pending)     See above as well    Laboratory: Labs reviewed  Results from last 7 days   Lab Units 05/13/24  0550   HEMOGLOBIN g/dL 12.0   HEMATOCRIT % 36.6   WBC Thousand/uL 7.98     Results from last 7 days   Lab Units 05/13/24  0550   BUN mg/dL 12   SODIUM mmol/L 141   POTASSIUM mmol/L 3.8   CHLORIDE mmol/L 103   CREATININE mg/dL 0.74   AST U/L 14   ALT U/L 21            Drug regimen reviewed, all potential adverse effects identified and addressed:    Scheduled Meds:  Current Facility-Administered Medications   Medication Dose Route Frequency Provider Last Rate    acetaminophen  975 mg Oral Q8H PRN Jenise Dawson PA-C      aluminum-magnesium hydroxide-simethicone  30 mL Oral Q6H PRN Jenise Dawson PA-C      amLODIPine  10 mg Oral Daily Jenise Dawson PA-C      atorvastatin  40 mg Oral After Dinner Jenise Dawson PA-C      bisacodyl  10 mg Rectal Daily PRN Omer Andrade MD      cloBAZam  5 mg Oral BID Jenise Dawson PA-C      dexamethasone  4 mg Oral Daily Jenise Dawson PA-C      dextromethorphan-guaiFENesin  10 mL Oral Q4H PRN Corina Arita MD      docusate sodium  100 mg Oral BID PRN Corina Arita MD      enoxaparin  40 mg Subcutaneous Daily Corina Arita MD      guaiFENesin  600 mg Oral Q12H LEN Omer Andrade MD      lacosamide  200 mg Oral Q12H LEN Jenise Dawson PA-C      levETIRAcetam  2,000 mg Oral Q12H LEN Jenise Dawson PA-C      losartan  50 mg Oral Daily Jenise Dawson PA-C      ondansetron  4 mg Oral Q6H PRN Jenise Dawson PA-C      pantoprazole  40 mg Oral Early Morning Jenise Dawson PA-C      polyethylene glycol  17 g Oral Daily PRN  "Corina Arita MD      pramipexole  0.25 mg Oral TID Jenise Dawson PA-C         Chief Complaints:  Cough     Subjective:     On eval, patient reports increased cough and possibly mild increased SOB with exertion without CP, fever, chills, sweats, sore throats, new body aches.  He denies headache, worsening strength, sensation, or other new complaints.     ROS: A 10 point ROS was performed; negative except as noted above.       Physical Exam:  05/13/24 0654 98.6 °F (37 °C) 63 16 152/84 113 94 % None (Room air) Lying   Vitals above reviewed on date of encounter    GEN:  Lying in bed in NAD   HEENT/NECK: MMM  CARDIAC: Regular rate rhythm, no murmers, no rubs, no gallops  LUNGS:  cough at times, sub-optimal aeration, no rhonchi or wheeze  ABDOMEN: Soft, non-tender, non-distended, normal active bowel sounds  EXTREMITIES/SKIN:  BLE mild edema without calf TTP  NEURO:   MENTAL STATUS: Oriented, able to follow simple commands, slightly sluggish mentation speed at times and Strength/MMT:  RUE/RLE 4+ to 5-/5; LUE 3- to 3+; LLE prox 2+ distal DF 1/5 PF 2+/5  PSYCH:  Affect:  Flattened some    PMR CS HPI:  \"Paras Pitts is a 68 y.o. 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  CT head showing right frontal lobe vasogenic edema with known underlying lesion.  MRI brain showing a right superior frontal mass resection and " "chemoradiation with unchanged linear enhancement along the surgical cavity compared to 3/26/2024 favoring radiation necrosis.  Patient seen by neurology.  Video EEG showing: Early in recording there was frequent or nearly continuous focal motor seizure and later there were two subclinical right fronto central electrographic seizures.  Patient placed on the following AED regimen: Keppra 2 g twice daily, Vimpat 200 mg twice daily, Onfi 5 mg twice daily.  Of note patient was significantly sedated with Ativan.   Patient to follow-up with epileptologist as an outpatient.  Patient was seen by radiation oncology regarding edema and radiation necrosis and recommended to increase dexamethasone to 4 mg twice daily.  Patient is under the care of Dr. Langford for medical oncology and is to begin Avastin, chemotherapy radiation therapy for his recurrent mediastinal disease post rehabilitation.  After medical stabilization, patient found to have acute functional deficits from his mobility and self-care, therefore admitted to [facility] for acute inpatient rehabilitation.\"    ** Please Note: Fluency Direct voice to text software may have been used in the creation of this document. **    I personally performed the required components and examined the patient myself in person on 5/13/24.    "

## 2024-05-11 NOTE — PROGRESS NOTES
05/11/24 0930   Pain Assessment   Pain Assessment Tool 0-10   Pain Score No Pain   Cognition   Orientation Level Oriented X4   Transfer Bed/Chair/Wheelchair   Sit to Stand Minimal Assist   Stand to Sit Minimal Assist   Ambulation   Primary Mode of Locomotion Prior to Admission Walk   Distance Walked (feet) 42 ft  (19)   Assist Device Platform;Roller Walker   Gait Pattern Inconsistant Chioma;Slow Chioma;Decreased foot clearance;L foot drag;Narrow ABHINAV;Step to;Improper weight shift;Decreased L stance   Provided Assistance with: Balance;Limb Advancement;Limb Stabilization;Weight Shift   Walk Assist Level Minimum Assist;Chair Follow   Wheelchair mobility   Method Right upper extremity;Right lower extremity   Assistance Provided For Locking Brakes   Distance Level Surface (feet) 320 ft   Therapeutic Interventions   Strengthening seated LE strengthening   Balance gait   Assessment   Treatment Assessment Pt sitting in W/C in room and willing to participate in therapy.  W/C mobility down to therapy with minimal cueing; challenged with turns cutting to sharp.  Able to complete seated strengthening with difficulty on L LE.  Able to amb using platform RW.  Mod-max A cueing, verbal and tactile to weight shift to R to advance L LE.  Quick fatigue noted.  Pt would benefit from skilled therapy to address deficits at this time.   Problem List Decreased strength;Decreased range of motion;Decreased endurance;Impaired balance;Decreased cognition;Decreased safety awareness;Pain;Impaired tone   Barriers to Discharge Decreased caregiver support;Inaccessible home environment   PT Barriers   Physical Impairment Decreased strength;Decreased range of motion;Decreased endurance;Impaired balance;Decreased mobility;Decreased coordination;Decreased safety awareness;Impaired tone   Functional Limitation Wheelchair management;Walking;Transfers;Standing;Stair negotiation;Ramp negotiation;Car transfers   Plan   Treatment/Interventions ADL  retraining;Functional transfer training;LE strengthening/ROM;Therapeutic exercise;Endurance training;Gait training   Progress Progressing toward goals   PT Therapy Minutes   PT Time In 0930   PT Time Out 1100   PT Total Time (minutes) 90   PT Mode of treatment - Individual (minutes) 60   PT Mode of treatment - Concurrent (minutes) 30   Therapy Time missed   Time missed? No

## 2024-05-11 NOTE — PROGRESS NOTES
"Progress Note - Paras Pitts 68 y.o. male MRN: [id number]    Unit/Bed#: [facility] 213-02 Encounter: [id number]        Subjective:   Patient seen and examined at bedside after reviewing the chart and discussing the case with the caring staff.      Patient examined at bedside.  Patient had a fall earlier today when he tried to reach down to  his urinal.  Patient was examined after the fall and he did not complain of any pain.  Patient did not hit his head or any other body part with any significant injury.    Physical Exam   Vitals: Blood pressure 139/85, pulse 84, temperature 97.8 °F (36.6 °C), temperature source Temporal, resp. rate 18, height 5' 9\" (1.753 m), weight 91.6 kg (201 lb 15.1 oz), SpO2 97%.,Body mass index is 29.82 kg/m².  Constitutional: Patient in no acute distress.  HEENT: PERR, EOMI, MMM.  Cardiovascular: Normal rate and regular rhythm.    Pulmonary/Chest: Effort normal and breath sounds normal. Lungs clear.  Abdomen: Soft, + BS, NT.    Assessment/Plan:  Paras Pitts is a(n) 68 y.o. male with tremors of the nervous system.     Cardiac Hx w/ HTN, HLD, hx of CVA.  Continue amlodipine 10 mg daily, Losartan 50 mg daily, atorvastatin 40 mg daily.  Type 2 diabetes mellitus.  Hgb A1c 6.7% on 5/3/24.  Carb controlled diet.   GERD.  Continue Protonix 40 mg daily.  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  Stage Nicholas.  S/p robotic converted to right thoracotomy and right upper lobectomy on 9/1/2023. S/p bronchoscopy with [facility] on 4/16/2024.  Continue Decadron 4 mg daily.  Patient completed CT-SIM at [facility] 4/29.  Further radiation treatment in 1-2 weeks per oncologist, Dr. Contreras.  Pain and bowel regimen. As per physiatry.  Cough. Robitussin as needed.   Tremors of the nervous system.  Continue Keppra twice daily, Vimpat 100 mg twice daily, clobazam 0.5 mg twice daily.  Follow up in 4 weeks with epilepsy attending.  Patient receiving intensive PT OT as per physiatry. "     Anticipated date of discharge.  Wednesday, 5/22/2024

## 2024-05-11 NOTE — PROGRESS NOTES
05/11/24 1100   Pain Assessment   Pain Assessment Tool 0-10   Pain Score No Pain   Restrictions/Precautions   Precautions Bed/chair alarms;Cognitive;Fall Risk;Seizure;Supervision on toilet/commode  (port R chest wall for chemo)   Weight Bearing Restrictions No   ROM Restrictions No   Exercise Tools   Theraputty Pt utilized B/L hands to manipulate Red Theraputty to find and obtain small items from within putty; Pt located 12/15 items   Hand Gripper R and L hand digit flex/ext with 5# digiflex, 40x   Other Exercise Tool 1 Towel slides in available planes 20x with L UE with limited assist from R UE   Other Exercise Tool 2 Pt utilized R and L hands to untie knots from Red Theratubing   Additional Activities   Additional Activities Other (Comment)   Additional Activities Comments Pt completed functional reaching with L UE to grasp small pegs from board and place into basket across mindline with up to Min A to L UE; Functioanl reaching with L UE to complete gross grasp to obtain cone from area lateral to Pt and then place onto table surface   Other Comments   Assessment Pt particiapted with 35 minutes concurrent tx with Pt with similar goals with focus of overall strengthening and activity tolerence for use with ADL routines   Assessment   Treatment Assessment Pt recieved sitting in WC and agreeable to participation with OT session. Focus with functional reaching and ROM/strengthening tasks to help support progress towards improved functional use with L UE. Pt enefited from overall increased time with tasks however indicated good motivation to participate with therapy services. Pt would benefit from continued participation with OT services to address barriers and support increased strength/ROM, activity tolerence and safety for use with self care routines and functional transfers and to help facilitate progress towards safe discharge.   Plan   Treatment/Interventions ADL retraining;Functional transfer  training;Therapeutic exercise;Endurance training;Patient/family training;Equipment eval/education;Bed mobility;Continued evaluation   Progress Progressing toward goals   OT Therapy Minutes   OT Time In 1100   OT Time Out 1200   OT Total Time (minutes) 60   OT Mode of treatment - Individual (minutes) 25   OT Mode of treatment - Concurrent (minutes) 35

## 2024-05-11 NOTE — NURSING NOTE
"This writer went into patient at 1242 to ask if he would do CHG wipes at that time. Nursing at that time noted patient to be using urinal. The patient stated \" it is hard to use a urinal one hand.\" This writer asked the patient if needed assistance. There patient stated \" No, it just takes me a little longer to do it.\" The nurse went to the CHG wipe warmer and to get supplies on the way back to the room. This writer and other nurse on unit at the nurses station immediately when into the room to find the patient on the floor. When asking patient what happened. The patient states that he dropped his first urinal on the ground so he got his second urinal off of his bedside table that was in reach. After finishing using the urinal that he did not drop. He put that on the table and when to reach for the urinal on the ground causing him to slip out of his chair. IM and PMR physicians made aware. Increase frequency of rounding initiated. Wife notified at 1pm When she came to visit patient. Patient denies any pain. Vital signs stable               "

## 2024-05-12 PROCEDURE — 97116 GAIT TRAINING THERAPY: CPT

## 2024-05-12 PROCEDURE — 97112 NEUROMUSCULAR REEDUCATION: CPT

## 2024-05-12 PROCEDURE — 97110 THERAPEUTIC EXERCISES: CPT

## 2024-05-12 PROCEDURE — 97542 WHEELCHAIR MNGMENT TRAINING: CPT

## 2024-05-12 RX ADMIN — CLOBAZAM 5 MG: 10 TABLET ORAL at 08:49

## 2024-05-12 RX ADMIN — LOSARTAN POTASSIUM 50 MG: 50 TABLET, FILM COATED ORAL at 08:43

## 2024-05-12 RX ADMIN — GUAIFENESIN AND DEXTROMETHORPHAN 10 ML: 100; 10 SYRUP ORAL at 15:44

## 2024-05-12 RX ADMIN — GUAIFENESIN AND DEXTROMETHORPHAN 10 ML: 100; 10 SYRUP ORAL at 21:04

## 2024-05-12 RX ADMIN — LEVETIRACETAM 2000 MG: 500 TABLET, FILM COATED ORAL at 08:43

## 2024-05-12 RX ADMIN — PRAMIPEXOLE DIHYDROCHLORIDE 0.25 MG: 0.12 TABLET ORAL at 21:04

## 2024-05-12 RX ADMIN — LEVETIRACETAM 2000 MG: 500 TABLET, FILM COATED ORAL at 21:00

## 2024-05-12 RX ADMIN — PRAMIPEXOLE DIHYDROCHLORIDE 0.12 MG: 0.12 TABLET ORAL at 08:42

## 2024-05-12 RX ADMIN — LACOSAMIDE 200 MG: 150 TABLET ORAL at 08:42

## 2024-05-12 RX ADMIN — DEXAMETHASONE 4 MG: 4 TABLET ORAL at 08:43

## 2024-05-12 RX ADMIN — GUAIFENESIN AND DEXTROMETHORPHAN 10 ML: 100; 10 SYRUP ORAL at 06:01

## 2024-05-12 RX ADMIN — PRAMIPEXOLE DIHYDROCHLORIDE 0.25 MG: 0.12 TABLET ORAL at 15:44

## 2024-05-12 RX ADMIN — AMLODIPINE BESYLATE 10 MG: 10 TABLET ORAL at 08:43

## 2024-05-12 RX ADMIN — ATORVASTATIN CALCIUM 40 MG: 40 TABLET, FILM COATED ORAL at 17:41

## 2024-05-12 RX ADMIN — ENOXAPARIN SODIUM 40 MG: 40 INJECTION SUBCUTANEOUS at 08:42

## 2024-05-12 RX ADMIN — PANTOPRAZOLE SODIUM 40 MG: 40 TABLET, DELAYED RELEASE ORAL at 06:01

## 2024-05-12 RX ADMIN — CLOBAZAM 5 MG: 10 TABLET ORAL at 17:42

## 2024-05-12 RX ADMIN — LACOSAMIDE 200 MG: 150 TABLET ORAL at 21:04

## 2024-05-12 NOTE — PROGRESS NOTES
"Progress Note - Paras Pitts 68 y.o. male MRN: [id number]    Unit/Bed#: [facility] 213-02 Encounter: [id number]        Subjective:   Patient seen and examined at bedside after reviewing the chart and discussing the case with the caring staff.      Patient examined at bedside.  Patient reports no acute symptoms.    Physical Exam   Vitals: Blood pressure 150/88, pulse 63, temperature 97.6 °F (36.4 °C), temperature source Tympanic, resp. rate 20, height 5' 9\" (1.753 m), weight 91.6 kg (201 lb 15.1 oz), SpO2 97%.,Body mass index is 29.82 kg/m².  Constitutional: Patient in no acute distress.  HEENT: PERR, EOMI, MMM.  Cardiovascular: Normal rate and regular rhythm.    Pulmonary/Chest: Effort normal and breath sounds normal. Lungs clear.  Abdomen: Soft, + BS, NT.    Assessment/Plan:  Paras Pitts is a(n) 68 y.o. male with tremors of the nervous system.     Cardiac Hx w/ HTN, HLD, hx of CVA.  Continue amlodipine 10 mg daily, Losartan 50 mg daily, atorvastatin 40 mg daily.  Type 2 diabetes mellitus.  Hgb A1c 6.7% on 5/3/24.  Carb controlled diet.   GERD.  Continue Protonix 40 mg daily.  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  Stage Nicholas.  S/p robotic converted to right thoracotomy and right upper lobectomy on 9/1/2023. S/p bronchoscopy with EBUS at [facility] on 4/16/2024.  Continue Decadron 4 mg daily.  Patient completed CT-SIM at [facility] 4/29.  Further radiation treatment in 1-2 weeks per oncologist, Dr. Contreras.  Pain and bowel regimen. As per physiatry.  Cough. Robitussin as needed.   Tremors of the nervous system.  Continue Keppra twice daily, Vimpat 100 mg twice daily, clobazam 0.5 mg twice daily.  Follow up in 4 weeks with epilepsy attending.  Patient receiving intensive PT OT as per physiatry.     Anticipated date of discharge.  Wednesday, 5/22/2024  "

## 2024-05-12 NOTE — NURSING NOTE
Pt resting in bed, pt reports slept well during the night. Increased rounding preformed. C/O cough this morning, PRN robitussin admin as ordered. Urinal use during the night, staff empties/sets up. Turns and repos self independently in bed. Transfers max assist. TEDS off for night time. All items within reach to pt, continuing to monitor and follow plan of care bed alarm intact for safety.

## 2024-05-12 NOTE — NURSING NOTE
Assist of 2 with transfer, mas assist with toileting after BM.  Voids in urinal, emptied by staff.  Left sided weakness.  Continue same plan of care..

## 2024-05-12 NOTE — PROGRESS NOTES
05/12/24 [facility]   Pain Assessment   Pain Assessment Tool 0-10   Pain Score No Pain   Restrictions/Precautions   Precautions Bed/chair alarms;Cognitive;Fall Risk;Seizure;Supervision on toilet/commode  (port R chest wall chemo)   Weight Bearing Restrictions No   ROM Restrictions No   Braces or Orthoses   (DF assist wrap)   Cognition   Overall Cognitive Status Impaired   Arousal/Participation Alert;Responsive;Cooperative   Attention Attends with cues to redirect   Orientation Level Oriented X4   Memory Decreased short term memory;Decreased recall of precautions   Following Commands Follows one step commands with increased time or repetition   Subjective   Subjective Pt reports he has learned his lesson from yesterday and will not lean forward to pick things up from the ground when sitting   Roll Left and Right   Comment Pt OOB   Sit to Lying   Comment Pt OOB   Lying to Sitting on Side of Bed   Comment Pt OOB   Sit to Stand   Type of Assistance Needed Physical assistance;Verbal cues   Physical Assistance Level 25% or less   Comment Alek L platform RW; VC for foot placement and sequencing   Sit to Stand CARE Score 3   Car Transfer   Reason if not Attempted Environmental limitations   Car Transfer CARE Score 10   Walk 10 Feet   Type of Assistance Needed Physical assistance;Verbal cues   Physical Assistance Level Total assistance   Comment modA L platform RW with w/c follow and with DF wrap assist in place; improved L LE advancement this session, especially with proper weight shift to R LE to allow for clearance; decreased L LE step length and stance time with increased fatigue requiring assist for weight shift; VC t/o for sequencing   Walk 10 Feet CARE Score 1   Walk 50 Feet with Two Turns   Type of Assistance Needed Physical assistance;Verbal cues   Physical Assistance Level Total assistance   Comment modA L platform RW with w/c follow and with DF wrap assist in place; improved L LE advancement this session, especially  with proper weight shift to R LE to allow for clearance; decreased L LE step length and stance time with increased fatigue requiring assist for weight shift; VC t/o for sequencing   Walk 50 Feet with Two Turns CARE Score 1   Walk 150 Feet   Reason if not Attempted Safety concerns   Walk 150 Feet CARE Score 88   Walking 10 Feet on Uneven Surfaces   Reason if not Attempted Safety concerns   Walking 10 Feet on Uneven Surfaces CARE Score 88   Ambulation   Primary Mode of Locomotion Prior to Admission Walk   Distance Walked (feet) 56 ft  (56', 46', 33')   Assist Device Platform;Roller Walker  (L)   Gait Pattern Inconsistant Chioma;Slow Chioma;Decreased foot clearance;L foot drag;Forward Flexion;L hemiparesis;Narrow ABHINAV;Step to;Step through;Decreased L stance;Improper weight shift   Limitations Noted In Balance;Coordination;Device Management;Endurance;Heel Strike;Posture;Safety;Sequencing;Speed;Strength;Swing   Provided Assistance with: Balance;Limb Advancement;Weight Shift   Walk Assist Level Moderate Assist   Findings modA L platform RW with w/c follow and with DF wrap assist in place; improved L LE advancement this session, especially with proper weight shift to R LE to allow for clearance; decreased L LE step length and stance time with increased fatigue requiring assist for weight shift; VC t/o for sequencing   Does the patient walk? 2. Yes   Wheel 50 Feet with Two Turns   Type of Assistance Needed Supervision;Verbal cues   Physical Assistance Level No physical assistance   Comment VC for sequencing   Wheel 50 Feet with Two Turns CARE Score 4   Wheel 150 Feet   Type of Assistance Needed Supervision;Verbal cues   Physical Assistance Level No physical assistance   Comment VC for sequencing   Wheel 150 Feet CARE Score 4   Wheelchair mobility   Does the patient use a wheelchair? 1. Yes   Type of Wheelchair Used 1. Manual   Method Right upper extremity;Right lower extremity   Assistance Provided For Locking  Brakes;Remove Leg Rest;Replace Leg Rest   Distance Level Surface (feet) 150 ft   Findings S, VC for sequencing   4 Steps   Reason if not Attempted Safety concerns   4 Steps CARE Score 88   12 Steps   Reason if not Attempted Safety concerns   12 Steps CARE Score 88   Picking Up Object   Reason if not Attempted Safety concerns   Picking Up Object CARE Score 88   Toilet Transfer   Comment Pt did not require during session   Therapeutic Interventions   Strengthening Seated LE TE   Flexibility Seated calf stretch   Other transfer training, gait training, w/c mobility   Assessment   Treatment Assessment Pt agreeable to PT this AM, received sitting upright in w/c. Pt is continuing to demonstrate good L LE advancement when he shifts weight towards R LE, but continues to fatigue quickly, requiring modA for weight shift, trunk support, and keeping RW in straight path. Pt demonstrates good sequencing and gait speed with less VC to minimize divided attention. Pt continuing to make improvements with L knee ext and L hip flexion AROM. He remains limited by L LE strength/endurance/ROM/WB tolerance and remains at high fall risk, especially with divided attention. Will continue with current PT POC to improve deficits and promote return to PLOF.   Problem List Decreased strength;Decreased range of motion;Decreased endurance;Impaired balance;Decreased cognition;Decreased safety awareness;Pain;Impaired tone   PT Barriers   Physical Impairment Decreased strength;Decreased range of motion;Decreased endurance;Impaired balance;Decreased mobility;Decreased coordination;Decreased safety awareness;Impaired tone   Functional Limitation Wheelchair management;Walking;Transfers;Standing;Stair negotiation;Ramp negotiation;Car transfers   Plan   Treatment/Interventions Functional transfer training;LE strengthening/ROM;Therapeutic exercise;Endurance training;Cognitive reorientation;Patient/family training;Equipment eval/education;Bed mobility;Gait  training   Progress Progressing toward goals   PT Therapy Minutes   PT Time In 1037   PT Time Out 1200   PT Total Time (minutes) 83   PT Mode of treatment - Individual (minutes) 23   PT Mode of treatment - Concurrent (minutes) 60   PT Mode of treatment - Group (minutes) 0   PT Mode of treatment - Co-treat (minutes) 0   PT Mode of Treatment - Total time(minutes) 83 minutes   PT Cumulative Minutes 779     Seated LE TE:  3x10, B/L LEs  March  LAQ  Hip ADd - Helio  GS  HR  TR    Patient remains OOB in w/c, all needs in reach.  Alarm in place and activated.  Encouraged use of call bell, patient verbalizes understanding.

## 2024-05-13 LAB
ALBUMIN SERPL BCP-MCNC: 3.9 G/DL (ref 3.5–5)
ALP SERPL-CCNC: 35 U/L (ref 34–104)
ALT SERPL W P-5'-P-CCNC: 21 U/L (ref 7–52)
ANION GAP SERPL CALCULATED.3IONS-SCNC: 6 MMOL/L (ref 4–13)
ANISOCYTOSIS BLD QL SMEAR: PRESENT
AST SERPL W P-5'-P-CCNC: 14 U/L (ref 13–39)
BACTERIA UR QL AUTO: NORMAL /HPF
BASOPHILS # BLD MANUAL: 0 THOUSAND/UL (ref 0–0.1)
BASOPHILS NFR MAR MANUAL: 0 % (ref 0–1)
BILIRUB SERPL-MCNC: 0.6 MG/DL (ref 0.2–1)
BILIRUB UR QL STRIP: NEGATIVE
BUN SERPL-MCNC: 12 MG/DL (ref 5–25)
CALCIUM SERPL-MCNC: 8.7 MG/DL (ref 8.4–10.2)
CHLORIDE SERPL-SCNC: 103 MMOL/L (ref 96–108)
CLARITY UR: CLEAR
CO2 SERPL-SCNC: 32 MMOL/L (ref 21–32)
COLOR UR: YELLOW
CREAT SERPL-MCNC: 0.74 MG/DL (ref 0.6–1.3)
EOSINOPHIL # BLD MANUAL: 0 THOUSAND/UL (ref 0–0.4)
EOSINOPHIL NFR BLD MANUAL: 0 % (ref 0–6)
ERYTHROCYTE [DISTWIDTH] IN BLOOD BY AUTOMATED COUNT: 15.2 % (ref 11.6–15.1)
FLUAV RNA RESP QL NAA+PROBE: NEGATIVE
FLUBV RNA RESP QL NAA+PROBE: NEGATIVE
GFR SERPL CREATININE-BSD FRML MDRD: 93 ML/MIN/1.73SQ M
GLUCOSE P FAST SERPL-MCNC: 129 MG/DL (ref 65–99)
GLUCOSE SERPL-MCNC: 129 MG/DL (ref 65–140)
GLUCOSE UR STRIP-MCNC: NEGATIVE MG/DL
HCT VFR BLD AUTO: 36.6 % (ref 36.5–49.3)
HGB BLD-MCNC: 12 G/DL (ref 12–17)
HGB UR QL STRIP.AUTO: NEGATIVE
KETONES UR STRIP-MCNC: NEGATIVE MG/DL
LEUKOCYTE ESTERASE UR QL STRIP: NEGATIVE
LYMPHOCYTES # BLD AUTO: 2.23 THOUSAND/UL (ref 0.6–4.47)
LYMPHOCYTES # BLD AUTO: 25 % (ref 14–44)
MCH RBC QN AUTO: 32.1 PG (ref 26.8–34.3)
MCHC RBC AUTO-ENTMCNC: 32.8 G/DL (ref 31.4–37.4)
MCV RBC AUTO: 98 FL (ref 82–98)
MONOCYTES # BLD AUTO: 0.48 THOUSAND/UL (ref 0–1.22)
MONOCYTES NFR BLD: 6 % (ref 4–12)
NEUTROPHILS # BLD MANUAL: 5.27 THOUSAND/UL (ref 1.85–7.62)
NEUTS BAND NFR BLD MANUAL: 6 % (ref 0–8)
NEUTS SEG NFR BLD AUTO: 60 % (ref 43–75)
NITRITE UR QL STRIP: NEGATIVE
NON-SQ EPI CELLS URNS QL MICRO: NORMAL /HPF
PH UR STRIP.AUTO: 7 [PH]
PLATELET # BLD AUTO: 145 THOUSANDS/UL (ref 149–390)
PLATELET BLD QL SMEAR: ADEQUATE
PMV BLD AUTO: 9.8 FL (ref 8.9–12.7)
POTASSIUM SERPL-SCNC: 3.8 MMOL/L (ref 3.5–5.3)
PROT SERPL-MCNC: 6.1 G/DL (ref 6.4–8.4)
PROT UR STRIP-MCNC: NEGATIVE MG/DL
RBC # BLD AUTO: 3.74 MILLION/UL (ref 3.88–5.62)
RBC #/AREA URNS AUTO: NORMAL /HPF
RBC MORPH BLD: PRESENT
RSV RNA RESP QL NAA+PROBE: NEGATIVE
SARS-COV-2 RNA RESP QL NAA+PROBE: NEGATIVE
SODIUM SERPL-SCNC: 141 MMOL/L (ref 135–147)
SP GR UR STRIP.AUTO: 1.01
UROBILINOGEN UR QL STRIP.AUTO: 0.2 E.U./DL
VARIANT LYMPHS # BLD AUTO: 3 %
WBC # BLD AUTO: 7.98 THOUSAND/UL (ref 4.31–10.16)
WBC #/AREA URNS AUTO: NORMAL /HPF

## 2024-05-13 PROCEDURE — 0241U HB NFCT DS VIR RESP RNA 4 TRGT: CPT | Performed by: FAMILY MEDICINE

## 2024-05-13 PROCEDURE — 85027 COMPLETE CBC AUTOMATED: CPT

## 2024-05-13 PROCEDURE — 97535 SELF CARE MNGMENT TRAINING: CPT

## 2024-05-13 PROCEDURE — 85007 BL SMEAR W/DIFF WBC COUNT: CPT

## 2024-05-13 PROCEDURE — 97542 WHEELCHAIR MNGMENT TRAINING: CPT

## 2024-05-13 PROCEDURE — 81001 URINALYSIS AUTO W/SCOPE: CPT | Performed by: PHYSICAL MEDICINE & REHABILITATION

## 2024-05-13 PROCEDURE — 97116 GAIT TRAINING THERAPY: CPT

## 2024-05-13 PROCEDURE — 97110 THERAPEUTIC EXERCISES: CPT

## 2024-05-13 PROCEDURE — 97530 THERAPEUTIC ACTIVITIES: CPT

## 2024-05-13 PROCEDURE — 99232 SBSQ HOSP IP/OBS MODERATE 35: CPT

## 2024-05-13 PROCEDURE — 80053 COMPREHEN METABOLIC PANEL: CPT

## 2024-05-13 PROCEDURE — 92507 TX SP LANG VOICE COMM INDIV: CPT | Performed by: NURSE PRACTITIONER

## 2024-05-13 RX ORDER — GUAIFENESIN 600 MG/1
600 TABLET, EXTENDED RELEASE ORAL EVERY 12 HOURS SCHEDULED
Status: DISCONTINUED | OUTPATIENT
Start: 2024-05-13 | End: 2024-05-24 | Stop reason: HOSPADM

## 2024-05-13 RX ADMIN — DEXAMETHASONE 4 MG: 4 TABLET ORAL at 09:37

## 2024-05-13 RX ADMIN — ENOXAPARIN SODIUM 40 MG: 40 INJECTION SUBCUTANEOUS at 09:36

## 2024-05-13 RX ADMIN — GUAIFENESIN AND DEXTROMETHORPHAN 10 ML: 100; 10 SYRUP ORAL at 09:47

## 2024-05-13 RX ADMIN — GUAIFENESIN 600 MG: 600 TABLET ORAL at 11:23

## 2024-05-13 RX ADMIN — LOSARTAN POTASSIUM 50 MG: 50 TABLET, FILM COATED ORAL at 09:37

## 2024-05-13 RX ADMIN — PRAMIPEXOLE DIHYDROCHLORIDE 0.25 MG: 0.12 TABLET ORAL at 09:37

## 2024-05-13 RX ADMIN — ATORVASTATIN CALCIUM 40 MG: 40 TABLET, FILM COATED ORAL at 17:43

## 2024-05-13 RX ADMIN — AMLODIPINE BESYLATE 10 MG: 10 TABLET ORAL at 09:37

## 2024-05-13 RX ADMIN — PRAMIPEXOLE DIHYDROCHLORIDE 0.25 MG: 0.12 TABLET ORAL at 22:00

## 2024-05-13 RX ADMIN — LACOSAMIDE 200 MG: 150 TABLET ORAL at 22:00

## 2024-05-13 RX ADMIN — CLOBAZAM 5 MG: 10 TABLET ORAL at 09:44

## 2024-05-13 RX ADMIN — LACOSAMIDE 200 MG: 150 TABLET ORAL at 09:37

## 2024-05-13 RX ADMIN — LEVETIRACETAM 2000 MG: 500 TABLET, FILM COATED ORAL at 21:00

## 2024-05-13 RX ADMIN — CLOBAZAM 5 MG: 10 TABLET ORAL at 17:43

## 2024-05-13 RX ADMIN — GUAIFENESIN AND DEXTROMETHORPHAN 10 ML: 100; 10 SYRUP ORAL at 00:53

## 2024-05-13 RX ADMIN — PANTOPRAZOLE SODIUM 40 MG: 40 TABLET, DELAYED RELEASE ORAL at 05:44

## 2024-05-13 RX ADMIN — LEVETIRACETAM 2000 MG: 500 TABLET, FILM COATED ORAL at 07:59

## 2024-05-13 RX ADMIN — GUAIFENESIN AND DEXTROMETHORPHAN 10 ML: 100; 10 SYRUP ORAL at 22:07

## 2024-05-13 RX ADMIN — GUAIFENESIN 600 MG: 600 TABLET ORAL at 22:00

## 2024-05-13 RX ADMIN — PRAMIPEXOLE DIHYDROCHLORIDE 0.25 MG: 0.12 TABLET ORAL at 16:28

## 2024-05-13 RX ADMIN — GUAIFENESIN AND DEXTROMETHORPHAN 10 ML: 100; 10 SYRUP ORAL at 05:44

## 2024-05-13 NOTE — PROGRESS NOTES
"Progress Note - Paras Pitts 68 y.o. male MRN: [id number]    Unit/Bed#: [facility] 213-02 Encounter: [id number]        Subjective:   Patient seen and examined at bedside after reviewing the chart and discussing the case with the caring staff.      Patient examined at bedside.  Patient cough has gotten worse today.    Labs were reviewed from 5/13/2024.  Patient's CBC showed white cell count 7.98 with a hemoglobin 12 and hematocrit 36.6.  Patient's CMP was within normal limits except elevated blood glucose 129.    Will follow-up COVID/RSV/flu test.    Physical Exam   Vitals: Blood pressure 152/84, pulse 63, temperature 98.6 °F (37 °C), temperature source Temporal, resp. rate 16, height 5' 9\" (1.753 m), weight 91.6 kg (201 lb 15.1 oz), SpO2 94%.,Body mass index is 29.82 kg/m².  Constitutional: Patient in no acute distress.  HEENT: PERR, EOMI, MMM.  Cardiovascular: Normal rate and regular rhythm.    Pulmonary/Chest: Patient has expiratory crackles involving bilateral lung base, breathing appears nonlabored  Abdomen: Soft, + BS, NT.    Assessment/Plan:  Paras Pitts is a(n) 68 y.o. male with tremors of the nervous system.     Cardiac Hx w/ HTN, HLD, hx of CVA.  Continue amlodipine 10 mg daily, Losartan 50 mg daily, atorvastatin 40 mg daily.  Type 2 diabetes mellitus.  Hgb A1c 6.7% on 5/3/24.  Carb controlled diet.   GERD.  Continue Protonix 40 mg daily.  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  Stage [facility].  S/p robotic converted to right thoracotomy and right upper lobectomy on 9/1/2023. S/p bronchoscopy with EBUS at [facility] on 4/16/2024.  Continue Decadron 4 mg daily.  Patient completed [facility] at [facility] 4/29.  Further radiation treatment in 1-2 weeks per oncologist, Dr. Contreras.  Pain and bowel regimen. As per physiatry.  Cough/congestion.  I will put the patient on Mucinex twice daily along with Robitussin as needed.  I will also get COVID/RSV/flu test 5/13/2024.  Tremors of the nervous system.  Continue " Keppra twice daily, Vimpat 100 mg twice daily, clobazam 0.5 mg twice daily.  Follow up in 4 weeks with epilepsy attending.  Patient receiving intensive PT OT as per physiatry.     Anticipated date of discharge.  Wednesday, 5/22/2024

## 2024-05-13 NOTE — PROGRESS NOTES
05/13/24 1230   Pain Assessment   Pain Assessment Tool 0-10   Pain Score No Pain   Restrictions/Precautions   Precautions Bed/chair alarms;Cognitive;Fall Risk;Seizure;Supervision on toilet/commode  (port R chest wall for chemo)   Braces or Orthoses   (DF assist wrap)   Cognition   Overall Cognitive Status Impaired   Arousal/Participation Alert;Responsive;Cooperative   Attention Attends with cues to redirect   Orientation Level Oriented X4   Memory Decreased short term memory;Decreased recall of precautions   Following Commands Follows one step commands without difficulty   Sit to Lying   Type of Assistance Needed Physical assistance;Verbal cues   Physical Assistance Level 26%-50%   Comment min-mod A mat table   Sit to Lying CARE Score 3   Lying to Sitting on Side of Bed   Type of Assistance Needed Physical assistance;Verbal cues   Physical Assistance Level 26%-50%   Comment mod A mat table   Lying to Sitting on Side of Bed CARE Score 3   Sit to Stand   Type of Assistance Needed Physical assistance;Verbal cues   Physical Assistance Level Total assistance   Comment initially mod A x 1 plus min A of another; then mod A x 1   Sit to Stand CARE Score 1   Bed-Chair Transfer   Type of Assistance Needed Physical assistance;Verbal cues   Physical Assistance Level 26%-50%   Comment min-mod A sit pivot wheelchair <>mat table; wheelchair>recliner   Chair/Bed-to-Chair Transfer CARE Score 3   Walk 10 Feet   Type of Assistance Needed Physical assistance;Verbal cues   Physical Assistance Level Total assistance   Comment mod A x 1 level surfaces; second person for chair follow   Walk 10 Feet CARE Score 1   Ambulation   Primary Mode of Locomotion Prior to Admission Walk   Distance Walked (feet) 31 ft  (21' 24' 31' 30')   Assist Device Platform;Roller Walker   Gait Pattern Inconsistant Chioma;Slow Chioma;Decreased foot clearance;L foot drag;Forward Flexion;L hemiparesis;Narrow ABHINAV;Step to;Decreased L stance;Improper weight shift    Limitations Noted In Balance;Coordination;Device Management;Endurance;Heel Strike;Posture;Safety;Sequencing;Speed;Strength;Swing   Provided Assistance with: Balance;Direction;Limb Advancement;Limb Stabilization;Trunk Support;Weight Shift   Walk Assist Level Moderate Assist;Chair Follow   Findings mod A x 1 level surfaces; second person for chair follow   Does the patient walk? 2. Yes   Wheel 50 Feet with Two Turns   Type of Assistance Needed Supervision;Verbal cues   Comment S level and unlevel surfaces; increased time to complete   Wheel 50 Feet with Two Turns CARE Score 4   Wheel 150 Feet   Type of Assistance Needed Supervision;Verbal cues   Comment S level and unlevel surfaces; increased time to complete   Wheel 150 Feet CARE Score 4   Wheelchair mobility   Does the patient use a wheelchair? 1. Yes   Type of Wheelchair Used 1. Manual   Method Right upper extremity;Right lower extremity   Assistance Provided For Remove Leg Rest;Replace Leg Rest;Remove armrests;Replace armrests   Distance Level Surface (feet) 194 ft  (194' 137')   Distance Wheeled 3% Grade 12 ft   Findings S level and unlevel surfaces; increased time to complete   Therapeutic Interventions   Strengthening seated and supine ther ex; AROM RLE, PROM LLE; including bridges and single/double leg LTR focusing on increased weight bearing through LLE as well as improved LLE control   Balance gait and transfer training   Other wheelchair mobility   Assessment   Treatment Assessment Patient agreeable to therapy session.   No pain reported.   Patient with rhonchi and audible wheezing t/o entire therapy session.  Patient required increased time to complete all tasks as well as frequent rest breaks during session.   Continues to requires cues for general safety as well as task sequence.   Initially needed increased assistance for STS transfer but was transferring with 1 assist by end of session.    Some improvement noted with LLE AROM, however, remains weak.    Completed ther ex for general LE strengthening as well as improved LLE control; gait and transfer training focusing on sequence and technique for improved balance and safety with functional mobility using PFRW.  Propels wheelchair over level and unlevel surfaces with S and increased time to complete.   PT Barriers   Physical Impairment Decreased strength;Decreased range of motion;Decreased endurance;Impaired balance;Decreased mobility;Decreased coordination;Decreased safety awareness;Impaired tone   Functional Limitation Wheelchair management;Walking;Transfers;Standing;Stair negotiation;Ramp negotiation;Car transfers   Plan   Treatment/Interventions Functional transfer training;LE strengthening/ROM;Therapeutic exercise;Bed mobility;Gait training  (wheelchair mobility)   Progress Slow progress, decreased activity tolerance   PT Therapy Minutes   PT Time In 1230   PT Time Out 1426   PT Total Time (minutes) 116   PT Mode of treatment - Individual (minutes) 116   PT Mode of treatment - Concurrent (minutes) 0   PT Mode of treatment - Group (minutes) 0   PT Mode of treatment - Co-treat (minutes) 0   PT Mode of Treatment - Total time(minutes) 116 minutes   PT Cumulative Minutes 895   Therapy Time missed   Time missed? No

## 2024-05-13 NOTE — PROGRESS NOTES
"   05/13/24 1520   Pain Assessment   Pain Assessment Tool 0-10   Pain Score No Pain   Restrictions/Precautions   Precautions Bed/chair alarms;Cardiac/sternal;Cognitive;Fall Risk;Aphasia;Supervision on toilet/commode   Speech/Language/Cognition Assessmetn   Treatment Assessment Pt reports feeling \"woozy\" following physical therapy. He is lying down in the recliner. He was able to participate in session. Able to spontaneously recall 3 medications names and purpose of meds that he was taking PTA. He did not use a pill box PTA and denied any medication errors that he could recall. He was encouraged to utilize a pill box upon discharge. He was able to recognize 7/9 medications dasia and able to name the purpose  5/9 of the medications. Provided with 2 words outloud and he added three additional words to the same category @ 60% dasia increased to 100% with time delay and additional min-mod cue. Wrote down a legible list of grocery items with only 1 spelling error. He was able to recall 9/10 of these items following a 3:00 time delay.   SLP Therapy Minutes   SLP Time In 1520   SLP Time Out 1550   SLP Total Time (minutes) 30   SLP Mode of treatment - Individual (minutes) 30   SLP Mode of treatment - Concurrent (minutes) 0   SLP Mode of treatment - Group (minutes) 0   SLP Mode of treatment - Co-treat (minutes) 0   SLP Mode of Treatment - Total time(minutes) 30 minutes   SLP Cumulative Minutes 485       "

## 2024-05-13 NOTE — PROGRESS NOTES
05/13/24 0900   Pain Assessment   Pain Assessment Tool 0-10   Pain Score No Pain   Restrictions/Precautions   Precautions Bed/chair alarms;Cognitive;Fall Risk;Multiple lines;Seizure;Supervision on toilet/commode  (port for chemo R chest wall)   Weight Bearing Restrictions No   ROM Restrictions No   Oral Hygiene   Type of Assistance Needed Independent   Physical Assistance Level No physical assistance   Comment seated in w/c at sink   Oral Hygiene CARE Score 6   Grooming   Able To Initiate Tasks;Acquire Items;Comb/Brush Hair;Wash/Dry Face;Brush/Clean Teeth;Wash/Dry Hands   Limitation Noted In Coordination;Problem Solving;Safety;Strength   Shower/Bathe Self   Type of Assistance Needed Physical assistance;Verbal cues   Physical Assistance Level 51%-75%   Comment Pt attempted to wash buttocks today using R arm while stabilizing with L arm, however could not wash L buttocks with L arm d/t L sided weakness. OTS used towel to dry buttocks. Pt required modA to stand and maintain balance while using sink for support. Verbal cues to manage BLEs when washing.   Shower/Bathe Self CARE Score 2   Bathing   Assessed Bath Style Sponge Bath   Anticipated D/C Bath Style Shower;Sponge Bath   Able to Gather/Transport No   Able to Wash/Rinse/Dry (body part) Left Arm;Right Arm;L Upper Leg;R Upper Leg;L Lower Leg/Foot;R Lower Leg/Foot;Chest;Abdomen;Perineal Area   Limitations Noted in Balance;Coordination;Endurance;ROM;Safety;Strength;Problem Solving   Positioning Seated;Standing   Tub/Shower Transfer   Reason Not Assessed Sponge Bath   Upper Body Dressing   Type of Assistance Needed Verbal cues;Incidental touching   Physical Assistance Level   (CGA)   Comment Pt required CGA to maintain balance EOB while donning shirt. Pt able to doff shirt seated in w/c with verbal cues for compensatory methods.   Upper Body Dressing CARE Score 4   Lower Body Dressing   Type of Assistance Needed Physical assistance   Physical Assistance Level Total  assistance   Comment pt able to don shorts sitting EOB using compensatory methods with CGA  for balance. Pt attempted to pull up pants on R side; ultimately, OT and OTS were required to assist in pulling them up all the way d/t decreased balance and increased fatigue   Lower Body Dressing CARE Score 1   Putting On/Taking Off Footwear   Type of Assistance Needed Physical assistance;Verbal cues;Adaptive equipment   Physical Assistance Level 26%-50%   Comment pt required total assist to don TEDs. Pt able to doff socks independently, however required modA and verbal cues for technique to don sock on L foot using sock aid. Pt able to don R sock sitting EOB with CGA to maintain balance.   Putting On/Taking Off Footwear CARE Score 3   Picking Up Object   Reason if not Attempted Safety concerns   Picking Up Object CARE Score 88   Dressing/Undressing Clothing   Remove UB Clothes Pullover Shirt   Don UB Clothes Pullover Shirt   Remove LB Clothes Undergarment;Socks   Don LB Clothes Shorts;Undergarment;Socks;TEDs   Limitations Noted In Balance;Coordination;Endurance;Problem Solving;Safety;Strength;ROM   Adaptive Equipment Sock Aide   Positioning Sit Edge Of Bed;Standing;Supported Sit   Sit to Stand   Type of Assistance Needed Physical assistance;Verbal cues   Physical Assistance Level 51%-75%   Comment verbal cues for hand/foot placement. Pt encouraged to develop routine of making sure L foot is properly positioned before standing. Pt overall modA   Sit to Stand CARE Score 2   Bed-Chair Transfer   Type of Assistance Needed Physical assistance   Physical Assistance Level 51%-75%   Comment mod-maxA while completing bed to chair SPT   Chair/Bed-to-Chair Transfer CARE Score 2   Transfer Bed/Chair/Wheelchair   Positioning Concerns Cognition  (Hemiparesis)   Limitations Noted In Balance;Coordination;Problem Solving;Endurance;UE Strength;LE Strength   Adaptive Equipment   (bed rails, w/c)   Toileting Hygiene   Type of Assistance  Needed Physical assistance   Physical Assistance Level 51%-75%   Comment OT managed clothing around buttocks and L hip, prepared wet wipes. Pt able to complete hygiene, OT completed wipe for thoroughness.   Toileting Hygiene CARE Score 2   Toileting   Able to Pull Clothing down yes, up yes.  (with assist from OT for L side)   Manage Hygiene Bladder;Bowel   Limitations Noted In Balance;Coordination;ROM;Safety;UE Strength;LE Strength;Problem Solving   Adaptive Equipment Grab Bar   Toilet Transfer   Type of Assistance Needed Physical assistance   Physical Assistance Level 51%-75%   Comment Increased time to complete, verbal cues for LLE participation   Toilet Transfer CARE Score 2   Toilet Transfer   Surface Assessed Raised Toilet   Transfer Technique Stand Pivot   Limitations Noted In Balance;Endurance;Problem Solving;ROM;Safety;UE Strength;LE Strength   Adaptive Equipment Grab Bar   Exercise Tools   Theraputty Pt utilized B/L hands to manipulate Red Theraputty to find and obtain small items from within putty. Pt instructed to mainly use L hand to work on improving FMC.   Other Exercise Tool 1 Towel slides L hand 25 reps each plane of motion with minimal assist from R hand   Cognition   Overall Cognitive Status Impaired   Arousal/Participation Alert;Responsive;Cooperative   Attention Attends with cues to redirect   Orientation Level Oriented X4   Memory Decreased short term memory;Decreased recall of precautions   Following Commands Follows one step commands inconsistently   Activity Tolerance   Activity Tolerance Patient tolerated treatment well   Assessment   Treatment Assessment Pt agreeable to skilled OT session this AM, focusing on ADL training, fxl transfers, and comepnsatory techniques. Pt recieved in w/c after breakfast. Pt continues with deficits in attention and memory. Pt struggles to recall and implement compensatory strategies such as placing L arm in protective posision, refraining from kicking L foot in  attempt to move L leg, and dressing techniques such as use of sock aid. Pt found to have bowel and bladder incontinence, OT addressed it and aided pt in cleaning up during toileting. Will plan to speak to nursing to develop a schedule to manage bowel/bladder more properly. Pt continues with barriers of decreased strength throughout but especially L side s/p previous CVA, decreased balance, impaired coordination L side, impaired safety awareness, problem solving, and attention, and decreased activity tolerance; all affect independence in self care and fxl transfers. Pt would benefit from continued skilled OT services in order to address listed barriers and prepare for safe d/c.   Problem List Decreased strength;Decreased range of motion;Decreased endurance;Impaired balance;Decreased coordination;Decreased cognition;Impaired judgement;Decreased safety awareness;Impaired tone   Plan   Treatment/Interventions ADL retraining;Functional transfer training;Therapeutic exercise;Endurance training;Cognitive reorientation;Patient/family training;Equipment eval/education;Bed mobility;Compensatory technique education;OT   Progress Progressing toward goals   OT Therapy Minutes   OT Time In 0900   OT Time Out 1100   OT Total Time (minutes) 120   OT Mode of treatment - Individual (minutes) 90   OT Mode of treatment - Concurrent (minutes) 30   Therapy Time missed   Time missed? No

## 2024-05-13 NOTE — PROGRESS NOTES
"   05/13/24 1130   Pain Assessment   Pain Assessment Tool 0-10   Pain Score No Pain   Restrictions/Precautions   Precautions Bed/chair alarms;Cognitive;Fall Risk;Seizure;Supervision on toilet/commode   Comprehension   Comprehension (FIM) 6 - Has only MILD difficulty with complex/abstract info   Expression   Expression (FIM) 6 - Expresses complex/abstract but requires:  more time   Social Interaction   Social Interaction (FIM) 6 - Interacts appropriately with others BUT requires extra  time   Problem Solving   Problem solving (FIM) 5 - Solves basic problems 90% of time   Memory   Memory (FIM) 5 - Recalls/performs request 90% of time   Speech/Language/Cognition Assessmetn   Treatment Assessment Pt reporting feeling \"tired\" today as well as \"lightheaded\". Pending testing to rule out virus. He had a fall out of his wheelchair this weekend reaching dwon for his call bell. verbal fluency animals 1:00 named 13 items. Drinks in 1:50 named 10 items . Tools named 10   in 3:00. Provided with 1 word and named an associated word @ 100% dasia with adequate processing time. [employer] [place] categorization @ 85% dasia.   Swallow Assessment   Swallow Treatment Assessment pt reports continued diet tolerance. No difficulty reported by staff. No swallow tx indicated, tolerating the least restrictive diet without difficulty.   SLP Therapy Minutes   SLP Time In 1130   SLP Time Out 1200   SLP Total Time (minutes) 30   SLP Mode of treatment - Individual (minutes) 30   SLP Mode of treatment - Concurrent (minutes) 0   SLP Mode of treatment - Group (minutes) 0   SLP Mode of treatment - Co-treat (minutes) 0   SLP Mode of Treatment - Total time(minutes) 30 minutes   SLP Cumulative Minutes 455       "

## 2024-05-13 NOTE — NURSING NOTE
OT adls this a.m.  Assist of 2 with transfers and toileting in bathroom.    Voiding qs in urinal with occ spill.  Left sided weakness.  Receiving visitors presently.  Same plan of care continued.  Lungs with coarse breath sounds this a.m. with scattered rhonchi and occ moist resp with occ audible wheeze.  Team , Dr Paresh Burden made aware.  Pt afebrile.  In no acute distress.  For portable CXR as ordered in a.m.

## 2024-05-14 ENCOUNTER — APPOINTMENT (INPATIENT)
Dept: RADIOLOGY | Facility: HOSPITAL | Age: 69
End: 2024-05-14
Payer: COMMERCIAL

## 2024-05-14 PROBLEM — R05.9 COUGH: Status: ACTIVE | Noted: 2024-05-14

## 2024-05-14 PROBLEM — R41.89 COGNITIVE IMPAIRMENT: Status: ACTIVE | Noted: 2024-05-14

## 2024-05-14 PROBLEM — K59.00 CONSTIPATION: Status: ACTIVE | Noted: 2024-04-25

## 2024-05-14 PROCEDURE — 97542 WHEELCHAIR MNGMENT TRAINING: CPT

## 2024-05-14 PROCEDURE — 97535 SELF CARE MNGMENT TRAINING: CPT

## 2024-05-14 PROCEDURE — 71045 X-RAY EXAM CHEST 1 VIEW: CPT

## 2024-05-14 PROCEDURE — 97530 THERAPEUTIC ACTIVITIES: CPT

## 2024-05-14 PROCEDURE — 92507 TX SP LANG VOICE COMM INDIV: CPT

## 2024-05-14 PROCEDURE — 99232 SBSQ HOSP IP/OBS MODERATE 35: CPT

## 2024-05-14 PROCEDURE — 97116 GAIT TRAINING THERAPY: CPT

## 2024-05-14 PROCEDURE — 92526 ORAL FUNCTION THERAPY: CPT

## 2024-05-14 PROCEDURE — 97110 THERAPEUTIC EXERCISES: CPT

## 2024-05-14 RX ORDER — POLYETHYLENE GLYCOL 3350 17 G/17G
17 POWDER, FOR SOLUTION ORAL DAILY
Status: DISCONTINUED | OUTPATIENT
Start: 2024-05-15 | End: 2024-05-24 | Stop reason: HOSPADM

## 2024-05-14 RX ADMIN — ENOXAPARIN SODIUM 40 MG: 40 INJECTION SUBCUTANEOUS at 09:23

## 2024-05-14 RX ADMIN — PRAMIPEXOLE DIHYDROCHLORIDE 0.25 MG: 0.12 TABLET ORAL at 21:00

## 2024-05-14 RX ADMIN — LEVETIRACETAM 2000 MG: 500 TABLET, FILM COATED ORAL at 21:00

## 2024-05-14 RX ADMIN — CLOBAZAM 5 MG: 10 TABLET ORAL at 17:28

## 2024-05-14 RX ADMIN — LOSARTAN POTASSIUM 50 MG: 50 TABLET, FILM COATED ORAL at 09:23

## 2024-05-14 RX ADMIN — AMLODIPINE BESYLATE 10 MG: 10 TABLET ORAL at 09:23

## 2024-05-14 RX ADMIN — LACOSAMIDE 200 MG: 150 TABLET ORAL at 21:00

## 2024-05-14 RX ADMIN — GUAIFENESIN 600 MG: 600 TABLET ORAL at 09:23

## 2024-05-14 RX ADMIN — CLOBAZAM 5 MG: 10 TABLET ORAL at 09:23

## 2024-05-14 RX ADMIN — PRAMIPEXOLE DIHYDROCHLORIDE 0.25 MG: 0.12 TABLET ORAL at 17:28

## 2024-05-14 RX ADMIN — LACOSAMIDE 200 MG: 150 TABLET ORAL at 09:23

## 2024-05-14 RX ADMIN — PRAMIPEXOLE DIHYDROCHLORIDE 0.25 MG: 0.12 TABLET ORAL at 09:22

## 2024-05-14 RX ADMIN — PANTOPRAZOLE SODIUM 40 MG: 40 TABLET, DELAYED RELEASE ORAL at 05:46

## 2024-05-14 RX ADMIN — ATORVASTATIN CALCIUM 40 MG: 40 TABLET, FILM COATED ORAL at 17:28

## 2024-05-14 RX ADMIN — POLYETHYLENE GLYCOL 3350 17 G: 17 POWDER, FOR SOLUTION ORAL at 09:23

## 2024-05-14 RX ADMIN — GUAIFENESIN 600 MG: 600 TABLET ORAL at 21:00

## 2024-05-14 RX ADMIN — LEVETIRACETAM 2000 MG: 500 TABLET, FILM COATED ORAL at 10:27

## 2024-05-14 RX ADMIN — DEXAMETHASONE 4 MG: 4 TABLET ORAL at 09:23

## 2024-05-14 NOTE — PROGRESS NOTES
"Progress Note - Paras Pitts 68 y.o. male MRN: [id number]    Unit/Bed#: -02 Encounter: [id number]        Subjective:   Patient seen and examined at bedside after reviewing the chart and discussing the case with the caring staff.      Patient examined at bedside.  Patient feels slightly better than yesterday with less cough.    Labs were reviewed from 5/14/2024.  Patient's viral panel including COVID/RSV/flu was found to be negative.  Patient's chest x-ray did not showed any evidence of pneumonia.    Physical Exam   Vitals: Blood pressure 147/86, pulse 71, temperature 98.1 °F (36.7 °C), temperature source Temporal, resp. rate 18, height 5' 9\" (1.753 m), weight 91.6 kg (201 lb 15.1 oz), SpO2 96%.,Body mass index is 29.82 kg/m².  Constitutional: Patient in no acute distress.  HEENT: PERR, EOMI, MMM.  Cardiovascular: Normal rate and regular rhythm.    Pulmonary/Chest: Patient has expiratory crackles involving bilateral lung base, breathing appears nonlabored  Abdomen: Soft, + BS, NT.    Assessment/Plan:  Paras Pitts is a(n) 68 y.o. male with tremors of the nervous system.     Cardiac Hx w/ HTN, HLD, hx of CVA.  Continue amlodipine 10 mg daily, Losartan 50 mg daily, atorvastatin 40 mg daily.  Type 2 diabetes mellitus.  Hgb A1c 6.7% on 5/3/24.  Carb controlled diet.   GERD.  Continue Protonix 40 mg daily.  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  Stage [place].  S/p robotic converted to right thoracotomy and right upper lobectomy on 9/1/2023. S/p bronchoscopy with EB[facility] at [facility] on 4/16/2024.  Continue Decadron 4 mg daily.  Patient [facility] at [facility] 4/29.  Further radiation treatment in 1-2 weeks per oncologist, Dr. Contreras.  Pain and bowel regimen. As per physiatry.  Cough/congestion.  I will put the patient on Mucinex twice daily along with Robitussin as needed. COVID/RSV/flu test 5/13/2024 was found to be negative.  Patient's chest x-ray was negative for pneumonia.  Tremors of the " nervous system.  Continue Keppra twice daily, Vimpat 100 mg twice daily, clobazam 0.5 mg twice daily.  Follow up in 4 weeks with epilepsy attending.  Patient receiving intensive PT OT as per physiatry.     Anticipated date of discharge.  Wednesday, 5/22/2024

## 2024-05-14 NOTE — PROGRESS NOTES
"   05/14/24 1130   Pain Assessment   Pain Assessment Tool 0-10   Pain Score No Pain   Pain Location/Orientation Location: Abdomen   Pain Onset/Description Onset: Ongoing   Effect of Pain on Daily Activities guarding   Patient's Stated Pain Goal No pain   Hospital Pain Intervention(s) Repositioned   Multiple Pain Sites No   Restrictions/Precautions   Precautions Bed/chair alarms;Cognitive;Fall Risk;Multiple lines;Supervision on toilet/commode;Seizure   Weight Bearing Restrictions No   ROM Restrictions No   Cognitive Linguisitic Assessments   Cognitive Linquistic Quick Test (CLQT) RBANS completed   Comprehension   Assist Devices Glasses   QI: Comprehension 3. Usually Understands: Understands most conversations, but misses some part/intent of message. Requires cues at times to understand.   Comprehension (FIM) 6 - Has only MILD difficulty with complex/abstract info   Expression   QI: Expression 4. Express complex messages without difficulty and with speech that is clear and easy to understan   Expression (FIM) 6 - Expresses complex/abstract but requires:  more time   Social Interaction   Social Interaction (FIM) 6 - Interacts appropriately with others BUT requires extra  time   Problem Solving   Problem solving (FIM) 5 - Solves basic problems 90% of time   Memory   Memory (FIM) 5 - Recalls/performs request 90% of time   Memory Skills   Orientation Level Oriented X4   Language Assessments   [place] Diagnostic Aphasia Exam (BDAE) BDAE short form   Informal Speech Language Assessment word finding errors   Speech/Language/Cognition Assessmetn   Treatment Assessment Patient reporting \"the drugs they give me really knock me out.\"  Patient reports feeling dizziness following medication, difficulty staying awake.  Noticed today that patient has increased difficulty with prolonged language, conversation, increased word finding etc.  Pt's processing appears appropriate but responses in general appear effortful.  He is able to " "engage about his daughter, overall medical history and timeline.  He is able to demonstrate and verbalize dysphagia strategies i'ly.  Pt discussing initiating chemo therapy tomorrow.   Swallow Information   Current Risks for Dysphagia & Aspiration New Neuro event   Current Symptoms/Concerns Clear throat   Current Diet Regular;Thin liquid   Baseline Diet Regular;Thin liquids   Consistencies Assessed and Performance   Materials Admnistered Regular/Solid;Thin liquid   Oral Stage WFL   Phargngeal Stage Mild impaired   Swallow Mechanics Mild delayed   Strategies and Efficacy small bites; small sips   Recommendations   Risk for Aspiration Mild   Recommendations Consider oral diet;Dysphagia treatment   Diet Solid Recommendation Regular consistency   Diet Liquid Recommendation Thin liquid   Recommended Form of Meds Whole;With puree   General Precautions Aspiration precautions   Compensatory Swallowing Strategies Alternate solids and liquids   Results Reviewed with RN;PT/Family/Caregiver   Eating   Type of Assistance Needed Set-up / clean-up   Physical Assistance Level No physical assistance   Eating CARE Score 5   Swallow Assessment   Swallow Treatment Assessment Patient observed today with lunch tray consisting of items within his regular diet and thin liquids.  Patient reports having had difficulty \"awhile back\" with roast beef sandwich due to it's dryness.  Pt reports that he hasn't ordered this item since that time.  No other items have posed a difficulty or risk to him.  Patient consuming mashed potatoes, edamame medley, tilapia, dinner roll, salad and thin liquids via straw.  Patient with slightly wet, congested respirations prior to any PO intake.  MD in and listening to lungs, pt feeling better overall but still not baseline.  Lower energy today compared to sessions past.  He discussed tolerance of diet.   Swallow Assessment Prognosis   Prognosis Good   Prognosis Considerations Co-morbidities   SLP Therapy Minutes "   SLP Time In 1130   SLP Time Out 1230   SLP Total Time (minutes) 60   SLP Mode of treatment - Individual (minutes) 60   SLP Mode of treatment - Concurrent (minutes) 0   SLP Mode of treatment - Group (minutes) 0   SLP Mode of treatment - Co-treat (minutes) 0   SLP Mode of Treatment - Total time(minutes) 60 minutes   SLP Cumulative Minutes 545   Therapy Time missed   Time missed? No

## 2024-05-14 NOTE — ASSESSMENT & PLAN NOTE
Cough and sore throat -improved sore throat with Magic mouthwash  Strep test negative  Continue Duonebs, Magic Mouthwash     Comgmt with IM team  Monitor lung exam, vitals with and without activity  Encourage incentive spirometry/flutter valve   Pt afebrile no leukocytosis; saturating well on RA   Covid/Flu/RSV PCR negative 5/13  CXR 5/14 - Right basilar linear opacity, likely atelectasis. Right upper lobectomy.  Mucinex BID and Robitussin DM PRN per IM

## 2024-05-14 NOTE — ASSESSMENT & PLAN NOTE
Cog stable 5/17; no recent safety concerns reported  Did have fall 5/11 when he tried to reach down to  urinal - MD young without signs of injury and patient continues to deny injury from fall  - High fall precautions with frequent rounding - if increased safety concerns consider q15 checks or 1:1  - MOCA completed 5/9 - obtain results   - Increased risk of delirium - monitor   - Patient/family/CG teaching   - Optimal Sleep/wake cycle management  - Limit sedating/deliriogenic meds when possible  - Optimal medical, mood, and pain management   - Skilled therapies as outlined   - Compensatory strategies   - Optimize oversight after discharge  - OP follow-up with PCP and neuro

## 2024-05-14 NOTE — PROGRESS NOTES
Physical Medicine and Rehabilitation Progress Note  Paras Pitts 68 y.o. male MRN: [id number]  Unit/Bed#: -02 Encounter: [id number]      Assessment & Plan:     Decline in ADLs and mobility: Functional assessment - improving         FIM  Care Score  Admit Score Recent Score    Total assist  1-100% or 2p    Tot Bathing, LBD LBD   Max assist 2-51-75%    Sub UBD To hygiene    Mod assist 3- 26-50%  Par     Min assist 3- 25% or < Par     CG assist 4  TA  UBD   Sup/Setup 4-5 Sup     Mod-I/Indep 6 MI      Transfers  Total  Significant-mod assist     Ambulation   Total  Total (Min-mod with 2nd person CF)    WC mob  Mod assist 150 ft  Supervision     Stairs   Total assist/NT      Goal: CGA-supervision for most ADLs and for WC mobility except possible increased assist for LBD/bathing  Major barriers:  Cog impairment, imbalance, deconditioning, risk of complications  Dispo: Home with [facility]OS 21+ days from admission      Cough  Assessment & Plan  Increasing cough recently but somewhat better per patient today  Comgmt with IM team  Monitor lung exam, vitals with and without activity  Encourage incentive spirometry/flutter valve   Pt afebrile no leukocytosis; saturating well on RA   Covid/Flu/RSV PCR negative 5/13  CXR pending   Mucinex BID and Robitussin DM PRN per IM     * Seizure disorder (HCC)  Assessment & Plan  No evidence of seizures recently    New onset seizures presenting for 2024  CT head showing right frontal lobe vasogenic edema with known underlying lesion.    MRI brain showing a right superior frontal mass resection and chemoradiation with unchanged linear enhancement along the surgical cavity compared to 3/26/2024 favoring radiation necrosis.    Patient seen by neurology.    Video EEG showing: Early in recording there was frequent or nearly continuous focal motor seizure and later there were two subclinical right fronto central electrographic seizures.    Patient placed on the following AED regimen: Keppra 2 g  twice daily, Vimpat 200 mg twice daily, Onfi 5 mg twice daily.  *Of note patient was significantly sedated with Ativan.  (Patient also on Mirapex 0.25mg TID presumable for tremors or RLS)  Continue seizure precautions     Patient to follow-up with neurology-epileptologist as an outpatient.    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  Dexamethasone taking 4mg qday per prior providers recently for vasogenic edema (appears to be on 2mg BID prior at home based on chart review)   Patient is under the care of Dr. Langford for medical oncology and is to begin Avastin, chemotherapy radiation therapy for his recurrent mediastinal disease post rehabilitation.  First infusion set for 5/15/2024.   Infusion center to set up chemo treatment   CBC, CMP, UA on Monday 5/13   Essentially unremarkable  Does have recent increased cough - comgmt per IM  Covid/flu/RSV negative 5/13  CXR pending     Brain mass  Assessment & Plan  Neuro exam grossly stable  Patient with known metastatsis to right frontal lobe s/p right frontal craniotomy in March 2023  Dexamethasone taking 4mg qday per prior providers recently for vasogenic edema (appears to be on 2mg BID prior at home based on chart review)   Residual mild left hemiparesis but remained highly functional and independent.  Patient status post chemotherapy radiation SRT, immunotherapy  Follows with Dr. Langford    Cognitive impairment  Assessment & Plan  Did have fall 5/11 when he tried to reach down to  urinal - MD young without signs of injury and patient continues to deny injury from  fall  - High fall precautions with frequent rounding - if increased safety concerns consider q15 checks or 1:1  - MOCA completed 5/9 - obtain results   - Increased risk of delirium - monitor   - Patient/family/CG teaching   - Optimal Sleep/wake cycle management  - Limit sedating/deliriogenic meds when possible  - Optimal medical, mood, and pain management   - Skilled therapies as outlined   - Compensatory strategies   - Optimize oversight after discharge  - OP follow-up with PCP and neuro      Left hemiparesis (HCC)  Assessment & Plan  Stable   Worsening left hemiparesis likely related to recent radiation necrosis and vasogenic edema, seizures  Continue Decadron 4 mg daily per prior order  Continue acute rehabilitation program to maximize function      History of CVA (cerebrovascular accident)  Assessment & Plan  Patient with ischemic CVA in 2011  Resultant mild left hemiparesis    Bilateral edema of lower extremity  Assessment & Plan  Stable; if worsens or becomes painful obtain BLE venous doppler   1+ Pitting edema bilaterally likely secondary to increased dependent position.    On Lovenox for DVT prophylaxis   TEDS daily  Elevate limbs more    Adjustment disorder  Assessment & Plan  Supportive counseling  Consider neuropsychology consultation if needed     Pseudobulbar affect  Assessment & Plan  Mood is much improved without emotional lability noted (week of 5/6/2024)  On admission at times was tearful and emotionally labile  May benefit from a future SSRI if continues      Sore throat  Assessment & Plan  Improved; Covid/RSV/flu negative 5/13   PRN Robitussin ordered    Constipation  Assessment & Plan  Miralax qday  Declines supp  If needed add senna      Hypercholesterolemia  Assessment & Plan  Continue Lipitor 40 mg daily (home dose)    Essential hypertension  Assessment & Plan  Continue Norvasc 10 mg daily and losartan 50 mg daily (home dose)  Monitor BP and heart rate during rest and with activity  Internal  medicine managing    Overweight (BMI 25.0-29.9)  Assessment & Plan  Dietary and lifestyle changes when able        Other Medical Issues:  Monitor for     Follow-up providers and other issues to be followed up after discharge  PCP  Neuro  H-O  Other chronic providers    CODE: Level 1: Full Code per prior providers    Restrictions include:  Fall precautions    Objective:    Allergies per EMR  Diagnostic Studies: Reviewed, no new imaging  XR chest 1 view    (Results Pending)     See above as well    Laboratory: Labs reviewed  Results from last 7 days   Lab Units 05/13/24  0550   HEMOGLOBIN g/dL 12.0   HEMATOCRIT % 36.6   WBC Thousand/uL 7.98     Results from last 7 days   Lab Units 05/13/24  0550   BUN mg/dL 12   SODIUM mmol/L 141   POTASSIUM mmol/L 3.8   CHLORIDE mmol/L 103   CREATININE mg/dL 0.74   AST U/L 14   ALT U/L 21            Drug regimen reviewed, all potential adverse effects identified and addressed:    Scheduled Meds:  Current Facility-Administered Medications   Medication Dose Route Frequency Provider Last Rate    acetaminophen  975 mg Oral Q8H PRN Jenise Dawson PA-C      aluminum-magnesium hydroxide-simethicone  30 mL Oral Q6H PRN Jenise Dawson PA-C      amLODIPine  10 mg Oral Daily Jenise Dawson PA-C      atorvastatin  40 mg Oral After Dinner Jenise Dawson PA-C      bisacodyl  10 mg Rectal Daily PRN Omer Andrade MD      cloBAZam  5 mg Oral BID Jenise Dawson PA-C      dexamethasone  4 mg Oral Daily Jenise Dawson PA-C      dextromethorphan-guaiFENesin  10 mL Oral Q4H PRN Corina Arita MD      docusate sodium  100 mg Oral BID PRN Corina Arita MD      enoxaparin  40 mg Subcutaneous Daily Corina Arita MD      guaiFENesin  600 mg Oral Q12H [facility] Omer Andrade MD      lacosamide  200 mg Oral Q12H LEN Jenise Dawson PA-C      levETIRAcetam  2,000 mg Oral Q12H LEN Jenise Dawson PA-C      losartan  50 mg Oral Daily  "Jenise Dawson PA-C      ondansetron  4 mg Oral Q6H PRN Jenise Dawson PA-C      pantoprazole  40 mg Oral Early Morning Jenise Dawson PA-C      polyethylene glycol  17 g Oral Daily PRN Corina Arita MD      [START ON 5/15/2024] polyethylene glycol  17 g Oral Daily Layton Yoder MD      pramipexole  0.25 mg Oral TID Jenise Dawson PA-C         Chief Complaints:  Cough     Subjective:     On eval, patient reports cough is a little better.  He reports feeling a little SOB b/c he just got back from PT.  He denies worsening strength, sensation, fever, chills, sweats, calf pain, CP.  He reports being ok still to go ahead with Avastin infusion tomorrow. He notes mild constipation and is passing gas without abdominal pain.     ROS: A 10 point ROS was performed; negative except as noted above.       Physical Exam:  Vitals:    05/14/24 0742   BP: 147/86   Pulse: 71   Resp: 18   Temp: 98.1 °F (36.7 °C)   SpO2: 96%     GEN:  Sitting in NAD   HEENT/NECK: MMM  CARDIAC: Regular rate rhythm, no murmers, no rubs, no gallops  LUNGS:   no rhonchi or wheeze  ABDOMEN: Soft, non-tender, non-distended, normal active bowel sounds  EXTREMITIES/SKIN:  BLE mild edema without calf TTP  NEURO:   MENTAL STATUS: Oriented, able to follow simple commands,improved mentation speed and interaction and Strength/MMT:  RUE/RLE 4+ to 5-/5; LUE 3- to 3+; LLE prox 2+ distal DF 1/5 PF 2+/5  PSYCH:  Affect:  Euthymic    PMR CS HPI:  \"Paras Pitts is a 68 y.o. male with past medical history significant for known metastatic stage IV adenocarcinoma of the lung which has metastasized to the brain, lymph node and thorax- patient status post right frontal craniotomy 3/23/2023, right upper lobectomy with lymph node resection 9/1/2023, chemotherapy, radiation SRT, immunotherapy, recent PET scan showing mediastinal recurrence, hypertension, prostate cancer, history of previous stroke in 2011 with mild left hemiparesis, " "who presented to the [facility] on 4/[facility]/2024 with seizure-like activity, with uncontrollable twitching in his left arm and left leg causing him to fall at work.  CT head showing right frontal lobe vasogenic edema with known underlying lesion.  MRI brain showing a right superior frontal mass resection and chemoradiation with unchanged linear enhancement along the surgical cavity compared to 3/26/2024 favoring radiation necrosis.  Patient seen by neurology.  Video EEG showing: Early in recording there was frequent or nearly continuous focal motor seizure and later there were two subclinical right fronto central electrographic seizures.  Patient placed on the following AED regimen: Keppra 2 g twice daily, Vimpat 200 mg twice daily, Onfi 5 mg twice daily.  Of note patient was significantly sedated with Ativan.   Patient to follow-up with epileptologist as an outpatient.  Patient was seen by radiation oncology regarding edema and radiation necrosis and recommended to increase dexamethasone to 4 mg twice daily.  Patient is under the care of Dr. Langford for medical oncology and is to begin Avastin, chemotherapy radiation therapy for his recurrent mediastinal disease post rehabilitation.  After medical stabilization, patient found to have acute functional deficits from his mobility and self-care, therefore admitted to [facility] for acute inpatient rehabilitation.\"    ** Please Note: Fluency Direct voice to text software may have been used in the creation of this document. **        "

## 2024-05-14 NOTE — PROGRESS NOTES
05/14/24 0857   Pain Assessment   Pain Assessment Tool 0-10   Pain Score No Pain   Restrictions/Precautions   Precautions Bed/chair alarms;Cognitive;Fall Risk;Multiple lines;Supervision on toilet/commode;Seizure  (R chest wall port for chemo)   Cognition   Overall Cognitive Status Impaired   Arousal/Participation Alert;Responsive;Cooperative   Attention Attends with cues to redirect   Orientation Level Oriented X4   Memory Decreased short term memory;Decreased recall of precautions   Following Commands Follows one step commands without difficulty   Sit to Stand   Type of Assistance Needed Physical assistance;Verbal cues   Physical Assistance Level 26%-50%   Comment min-mod A STS stair rail   Sit to Stand CARE Score 3   Therapeutic Interventions   Strengthening seated and standing ther ex; occ AAROM LLE   Balance transfer training   Assessment   Treatment Assessment Patient agreeable to therapy session.  No reports of pain noted.  Continues with  audible wheezes.  CXR completed this AM.   Patient with improved AROM LLE during both seated and standing ther ex requiring less assistance.    Patient also with improved standing balance with decreased cues for upright posture.  Completed ther ex for general LE strengthening as well improved upright posture and LLE control; transfer training focusing on sequence and technique for improved balance and safety with functional transfers.  Will see patient for further mobility training during PM therapy session.   PT Barriers   Physical Impairment Decreased strength;Decreased range of motion;Decreased endurance;Impaired balance;Decreased mobility;Decreased coordination;Decreased safety awareness;Impaired tone   Functional Limitation Wheelchair management;Walking;Transfers;Standing;Stair negotiation;Ramp negotiation;Car transfers   Plan   Treatment/Interventions Functional transfer training;LE strengthening/ROM;Therapeutic exercise   Progress Progressing toward goals   PT  Therapy Minutes   PT Time In 0857   PT Time Out 0959   PT Total Time (minutes) 62   PT Mode of treatment - Individual (minutes) 62   PT Mode of treatment - Concurrent (minutes) 0   PT Mode of treatment - Group (minutes) 0   PT Mode of treatment - Co-treat (minutes) 0   PT Mode of Treatment - Total time(minutes) 62 minutes   PT Cumulative Minutes 957   Therapy Time missed   Time missed? No

## 2024-05-14 NOTE — PROGRESS NOTES
05/14/24 0745   Pain Assessment   Pain Assessment Tool 0-10   Pain Score No Pain   Restrictions/Precautions   Precautions Bed/chair alarms;Cognitive;Fall Risk;Multiple lines;Supervision on toilet/commode;Seizure  (port R chest wall for chemo)   Weight Bearing Restrictions No   ROM Restrictions No   Eating   Type of Assistance Needed Set-up / clean-up   Physical Assistance Level No physical assistance   Eating CARE Score 5   Eating Assessment   Food To Mouth Yes   Able To Cut Yes   Meal Assessed Breakfast   Opens Packages No   Oral Hygiene   Type of Assistance Needed Set-up / clean-up  (seated at sink in w/c)   Oral Hygiene CARE Score 5   Grooming   Able To Initiate Tasks;Acquire Items;Comb/Brush Hair;Wash/Dry Face;Wash/Dry Hands;Brush/Clean Teeth   Limitation Noted In Coordination;Problem Solving;Safety;Strength   Shower/Bathe Self   Type of Assistance Needed Physical assistance;Verbal cues   Physical Assistance Level 26%-50%   Comment verbal cues for CHG wipes, min-modA to wash buttocks and maintain balance while standing   Shower/Bathe Self CARE Score 3   Bathing   Assessed Bath Style Sponge Bath   Anticipated D/C Bath Style Shower;Sponge Bath   Able to Gather/Transport No   Able to Wash/Rinse/Dry (body part) Left Arm;Right Arm;L Upper Leg;R Upper Leg;L Lower Leg/Foot;R Lower Leg/Foot;Chest;Abdomen;Perineal Area   Limitations Noted in Balance;Coordination;Endurance;Problem Solving;ROM;Safety;Strength   Positioning Seated;Standing   Tub/Shower Transfer   Reason Not Assessed Sponge Bath   Upper Body Dressing   Type of Assistance Needed Supervision   Physical Assistance Level No physical assistance   Comment seated in w/c   Upper Body Dressing CARE Score 4   Lower Body Dressing   Type of Assistance Needed Physical assistance   Physical Assistance Level 76% or more   Comment Pt able to doff undergarment/shorts, required maxA to don undergarment/shorts. Pt assisted to pull shorts/undergarment up   Lower Body  Dressing CARE Score 2   Putting On/Taking Off Footwear   Type of Assistance Needed Physical assistance   Physical Assistance Level 26%-50%   Comment pt able to don/doff socks seated in w/c at supervision level; required assist to don TEDs.   Putting On/Taking Off Footwear CARE Score 3   Picking Up Object   Reason if not Attempted Safety concerns   Picking Up Object CARE Score 88   Dressing/Undressing Clothing   Remove UB Clothes Pullover Shirt   Don UB Clothes Pullover Shirt   Remove LB Clothes Shorts;Undergarment;Socks   Don LB Clothes Shorts;Undergarment;Socks;TEDs   Limitations Noted In Balance;Coordination;Endurance;Problem Solving;Safety;Strength;ROM   Positioning Supported Sit;Standing   Sit to Stand   Type of Assistance Needed Physical assistance;Verbal cues   Physical Assistance Level 51%-75%   Comment modA; verbal cues for L foot placement   Sit to Stand CARE Score 2   Bed-Chair Transfer   Type of Assistance Needed Physical assistance   Physical Assistance Level 26%-50%   Comment min-modA assist to maintain balance during SPT from w/c to raised toilet   Chair/Bed-to-Chair Transfer CARE Score 3   Transfer Bed/Chair/Wheelchair   Limitations Noted In Balance;Coordination;Endurance;Problem Solving;UE Strength;LE Strength   Adaptive Equipment Other  (Grab bar)   Toileting Hygiene   Type of Assistance Needed Physical assistance   Physical Assistance Level 51%-75%   Comment pt able to manage hygiene however required assist to pull pants up/down and maintain balance in standing   Toileting Hygiene CARE Score 2   Toileting   Able to Pull Clothing down no, up no.   Manage Hygiene Bladder   Limitations Noted In Balance;Coordination;Problem Solving;ROM;Safety;UE Strength;LE Strength   Toilet Transfer   Type of Assistance Needed Physical assistance   Physical Assistance Level Total assistance   Comment modA x2; OT assist to move L leg back towards toilet to complete transfer   Toilet Transfer CARE Score 1   Toilet  Transfer   Surface Assessed Raised Toilet   Transfer Technique Stand Pivot   Limitations Noted In Balance;Endurance;Problem Solving;ROM;Safety;LE Strength;UE Strength   Adaptive Equipment Grab Bar   Cognition   Overall Cognitive Status Impaired   Arousal/Participation Alert;Responsive;Cooperative   Attention Attends with cues to redirect   Orientation Level Oriented X4   Memory Decreased recall of precautions;Decreased short term memory   Following Commands Follows one step commands without difficulty   Activity Tolerance   Activity Tolerance Patient tolerated treatment well   Other Comments   Assessment Pt particiapted with 61 minutes concurrent tx with Pt with similar goals with focus of overall strengthening and activity tolerence for use with ADL routines   Assessment   Treatment Assessment Pt agreeable to skilled OT session this AM, focusing on ADL training and fxl transfers. Pt exhibited carryover of precautions/compensatory methods today; he was able to place his L foot in proper ABHINAV positioning without kicking it, and pt was able to don/doff his shirt seated at supervison level today. Pt demonstrates increased balance while standing, however still requires min-modA to aid in maintaining balance. Pt continues with barriers of decreased strength throughout but especially L side s/p previous CVA, decreased balance, impaired coordination L side, impaired safety awareness, problem solving, and attention, and decreased activity tolerance; all affect independence in self care and fxl transfers. Pt would benefit from continued skilled OT services in order to address listed barriers and prepare for safe d/c.   Prognosis Good   Problem List Decreased strength;Decreased range of motion;Impaired balance;Decreased endurance;Decreased coordination;Decreased cognition;Decreased safety awareness;Impaired judgement;Impaired tone   Plan   Treatment/Interventions ADL retraining;Functional transfer training;Therapeutic  exercise;Endurance training;Cognitive reorientation;Patient/family training;Equipment eval/education;Bed mobility;Compensatory technique education;Spoke to nursing;OT   Progress Progressing toward goals   OT Therapy Minutes   OT Time In 0745   OT Time Out 0846   OT Total Time (minutes) 61   OT Mode of treatment - Individual (minutes) 0   OT Mode of treatment - Concurrent (minutes) 61   Therapy Time missed   Time missed? No

## 2024-05-14 NOTE — PROGRESS NOTES
05/14/24 1235   Pain Assessment   Pain Assessment Tool 0-10   Pain Score No Pain   Restrictions/Precautions   Precautions Bed/chair alarms;Cognitive;Fall Risk;Multiple lines;Supervision on toilet/commode;Seizure  (R chest wall port for chemo)   Cognition   Overall Cognitive Status Impaired   Arousal/Participation Alert;Responsive;Cooperative   Attention Attends with cues to redirect   Orientation Level Oriented X4   Memory Decreased short term memory;Decreased recall of precautions   Following Commands Follows one step commands without difficulty   Sit to Stand   Type of Assistance Needed Physical assistance;Verbal cues   Physical Assistance Level 26%-50%   Comment mod A with PFRW   Sit to Stand CARE Score 3   Bed-Chair Transfer   Type of Assistance Needed Physical assistance;Verbal cues   Physical Assistance Level 26%-50%   Comment mod A sit pivot wheelchair>recliner   Chair/Bed-to-Chair Transfer CARE Score 3   Walk 10 Feet   Type of Assistance Needed Physical assistance;Verbal cues   Physical Assistance Level Total assistance   Comment mod A x 1 level surfaces; second person for chair follow; decreased step LLE, increased cues for weight shift to clear L foot for advancement; increased L lateral lean   Walk 10 Feet CARE Score 1   Ambulation   Primary Mode of Locomotion Prior to Admission Walk   Distance Walked (feet) 29 ft  (22' 28' 29' 27')   Assist Device Platform;Roller Walker   Gait Pattern Inconsistant Chi[place];Slow [place];Decreased foot clearance;L foot drag;Forward Flexion;L hemiparesis;Narrow ABHINAV;Step to;Decreased L stance;Improper weight shift   Limitations Noted In Balance;Coordination;Device Management;Endurance;Heel Strike;Posture;Safety;Sequencing;Speed;Strength;Swing   Provided Assistance with: Balance;Direction;Limb Advancement;Limb Stabilization;Trunk Support;Weight Shift   Walk Assist Level Moderate Assist;Chair Follow   Findings mod A x 1 level surfaces; second person for chair follow;  decreased step LLE, increased cues for weight shift to clear L foot for advancement; increased L lateral lean   Does the patient walk? 2. Yes   Wheel 50 Feet with Two Turns   Type of Assistance Needed Supervision;Verbal cues   Comment S level and unlevel surfaces; increased time to complete   Wheel 50 Feet with Two Turns CARE Score 4   Wheel 150 Feet   Type of Assistance Needed Supervision;Verbal cues   Comment S level and unlevel surfaces; increased time to complete   Wheel 150 Feet CARE Score 4   Wheelchair mobility   Does the patient use a wheelchair? 1. Yes   Type of Wheelchair Used 1. Manual   Method Right upper extremity;Right lower extremity   Assistance Provided For Remove Leg Rest;Replace Leg Rest;Remove armrests;Replace armrests   Distance Level Surface (feet) 198 ft  (198' 141')   Distance Wheeled 3% Grade 12 ft   Findings S level and unlevel surfaces; increased time to complete   Therapeutic Interventions   Balance gait and transfer training   Other wheelchair mobiity   Assessment   Treatment Assessment Patient agreeable to therapy session.   Remains pain free.  Audible wheezes remains.   Continues with increased fatigue and SOB, requiring frequent rest periods.   Cues for general safety as well as task sequence.    Completed gait and transfer training focusing on sequence and technique for improved balance and safety with functional mobility using PFRW.    Continues to demonstrate inconsistent  step length, weight shift, and L heel strike.   Patient propels wheelchair S level and unlevel surfaces.   PT Barriers   Physical Impairment Decreased strength;Decreased range of motion;Decreased endurance;Impaired balance;Decreased mobility;Decreased coordination;Decreased safety awareness;Impaired tone   Functional Limitation Wheelchair management;Walking;Transfers;Standing;Stair negotiation;Ramp negotiation;Car transfers   Plan   Treatment/Interventions Functional transfer training;Gait training  (wheelchair  mobility)   Progress Slow progress, decreased activity tolerance   PT Therapy Minutes   PT Time In 1235   PT Time Out 1335   PT Total Time (minutes) 60   PT Mode of treatment - Individual (minutes) 60   PT Mode of treatment - Concurrent (minutes) 0   PT Mode of treatment - Group (minutes) 0   PT Mode of treatment - Co-treat (minutes) 0   PT Mode of Treatment - Total time(minutes) 60 minutes   PT Cumulative Minutes 1017   Therapy Time missed   Time missed? No

## 2024-05-15 ENCOUNTER — HOSPITAL ENCOUNTER (OUTPATIENT)
Dept: INFUSION CENTER | Facility: HOSPITAL | Age: 69
Discharge: HOME | End: 2024-05-15
Attending: INTERNAL MEDICINE

## 2024-05-15 PROCEDURE — 97116 GAIT TRAINING THERAPY: CPT

## 2024-05-15 PROCEDURE — 99232 SBSQ HOSP IP/OBS MODERATE 35: CPT

## 2024-05-15 PROCEDURE — 92507 TX SP LANG VOICE COMM INDIV: CPT | Performed by: NURSE PRACTITIONER

## 2024-05-15 PROCEDURE — 97530 THERAPEUTIC ACTIVITIES: CPT

## 2024-05-15 PROCEDURE — 97542 WHEELCHAIR MNGMENT TRAINING: CPT

## 2024-05-15 PROCEDURE — 97535 SELF CARE MNGMENT TRAINING: CPT

## 2024-05-15 PROCEDURE — 97110 THERAPEUTIC EXERCISES: CPT

## 2024-05-15 PROCEDURE — 3E04305 INTRODUCTION OF OTHER ANTINEOPLASTIC INTO CENTRAL VEIN, PERCUTANEOUS APPROACH: ICD-10-PCS | Performed by: INTERNAL MEDICINE

## 2024-05-15 RX ORDER — DEXAMETHASONE 4 MG/1
8 TABLET ORAL 2 TIMES DAILY
Status: DISCONTINUED | OUTPATIENT
Start: 2024-05-15 | End: 2024-05-15

## 2024-05-15 RX ORDER — DEXAMETHASONE 4 MG/1
8 TABLET ORAL 2 TIMES DAILY
Status: COMPLETED | OUTPATIENT
Start: 2024-05-15 | End: 2024-05-16

## 2024-05-15 RX ORDER — SODIUM CHLORIDE 9 MG/ML
20 INJECTION, SOLUTION INTRAVENOUS ONCE
Status: COMPLETED | OUTPATIENT
Start: 2024-05-15 | End: 2024-05-15

## 2024-05-15 RX ORDER — DEXAMETHASONE 4 MG/1
4 TABLET ORAL DAILY
Status: DISCONTINUED | OUTPATIENT
Start: 2024-05-17 | End: 2024-05-24 | Stop reason: HOSPADM

## 2024-05-15 RX ADMIN — DOCETAXEL 155.2 MG: 20 INJECTION, SOLUTION, CONCENTRATE INTRAVENOUS at 15:14

## 2024-05-15 RX ADMIN — DEXAMETHASONE 8 MG: 4 TABLET ORAL at 10:28

## 2024-05-15 RX ADMIN — GUAIFENESIN 600 MG: 600 TABLET ORAL at 20:51

## 2024-05-15 RX ADMIN — SODIUM CHLORIDE 20 ML/HR: 9 INJECTION, SOLUTION INTRAVENOUS at 12:30

## 2024-05-15 RX ADMIN — POLYETHYLENE GLYCOL 3350 17 G: 17 POWDER, FOR SOLUTION ORAL at 09:59

## 2024-05-15 RX ADMIN — LEVETIRACETAM 2000 MG: 500 TABLET, FILM COATED ORAL at 08:01

## 2024-05-15 RX ADMIN — LACOSAMIDE 200 MG: 150 TABLET ORAL at 09:59

## 2024-05-15 RX ADMIN — BEVACIZUMAB 457.5 MG: 400 INJECTION, SOLUTION INTRAVENOUS at 13:28

## 2024-05-15 RX ADMIN — CLOBAZAM 5 MG: 10 TABLET ORAL at 09:58

## 2024-05-15 RX ADMIN — ENOXAPARIN SODIUM 40 MG: 40 INJECTION SUBCUTANEOUS at 09:59

## 2024-05-15 RX ADMIN — ATORVASTATIN CALCIUM 40 MG: 40 TABLET, FILM COATED ORAL at 18:21

## 2024-05-15 RX ADMIN — GUAIFENESIN 600 MG: 600 TABLET ORAL at 09:59

## 2024-05-15 RX ADMIN — DEXAMETHASONE SODIUM PHOSPHATE: 100 INJECTION INTRAMUSCULAR; INTRAVENOUS at 12:31

## 2024-05-15 RX ADMIN — PANTOPRAZOLE SODIUM 40 MG: 40 TABLET, DELAYED RELEASE ORAL at 06:16

## 2024-05-15 RX ADMIN — PRAMIPEXOLE DIHYDROCHLORIDE 0.25 MG: 0.12 TABLET ORAL at 20:51

## 2024-05-15 RX ADMIN — PEGFILGRASTIM 6 MG: KIT SUBCUTANEOUS at 15:58

## 2024-05-15 RX ADMIN — DEXAMETHASONE 8 MG: 4 TABLET ORAL at 20:54

## 2024-05-15 RX ADMIN — CLOBAZAM 5 MG: 10 TABLET ORAL at 18:21

## 2024-05-15 RX ADMIN — PRAMIPEXOLE DIHYDROCHLORIDE 0.25 MG: 0.12 TABLET ORAL at 09:58

## 2024-05-15 RX ADMIN — LOSARTAN POTASSIUM 50 MG: 50 TABLET, FILM COATED ORAL at 09:59

## 2024-05-15 RX ADMIN — AMLODIPINE BESYLATE 10 MG: 10 TABLET ORAL at 09:59

## 2024-05-15 RX ADMIN — PRAMIPEXOLE DIHYDROCHLORIDE 0.25 MG: 0.12 TABLET ORAL at 17:11

## 2024-05-15 RX ADMIN — LEVETIRACETAM 2000 MG: 500 TABLET, FILM COATED ORAL at 20:51

## 2024-05-15 RX ADMIN — LACOSAMIDE 200 MG: 150 TABLET ORAL at 20:51

## 2024-05-15 NOTE — PROGRESS NOTES
"Progress Note - Paras Pitts 68 y.o. male MRN: [id number]    Unit/Bed#: [facility] 213-02 Encounter: [id number]        Subjective:   Patient seen and examined at bedside after reviewing the chart and discussing the case with the caring staff.      Patient examined at bedside.  Patient has no acute symptoms. States his cough is much better. Denies shortness of breath or chest pain.     Physical Exam   Vitals: Blood pressure 118/72, pulse 76, temperature 97.7 °F (36.5 °C), temperature source Temporal, resp. rate 20, height 5' 9.02\" (1.753 m), weight 95.3 kg (210 lb 1.6 oz), SpO2 93%.,Body mass index is 31.01 kg/m².  Constitutional: Patient in no acute distress.  HEENT: PERR, EOMI, MMM.  Cardiovascular: Normal rate and regular rhythm.    Pulmonary/Chest: Patient has expiratory crackles involving bilateral lung base, breathing appears nonlabored  Abdomen: Soft, + BS, NT.    Assessment/Plan:  Paras Pitts is a(n) 68 y.o. male with tremors of the nervous system.     Cardiac Hx w/ HTN, HLD, hx of CVA.  Continue amlodipine 10 mg daily, Losartan 50 mg daily, atorvastatin 40 mg daily.  Type 2 diabetes mellitus.  Hgb A1c 6.7% on 5/3/24.  Carb controlled diet.   GERD.  Continue Protonix 40 mg daily.  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  Stage [facility].  S/p robotic converted to right thoracotomy and right upper lobectomy on 9/1/2023. S/p bronchoscopy with EBUS at [facility] on 4/16/2024.  Continue Decadron 4 mg daily.  Patient completed CT-SIM at [facility] 4/29.  Further radiation treatment in 1-2 weeks per oncologist, Dr. Contreras. Chemo infusion 5/15.   Pain and bowel regimen. As per physiatry.  Cough/congestion.  Start Mucinex twice daily along with Robitussin as needed. COVID/RSV/flu test 5/13 found to be negative.  Chest x-ray was negative for pneumonia.  Tremors of the nervous system.  Continue Keppra twice daily, Vimpat 100 mg twice daily, clobazam 0.5 mg twice daily.  Follow up in 4 weeks with epilepsy attending. "  Patient receiving intensive PT OT as per physiatry.     Anticipated date of discharge.  Wednesday, 5/22/2024    The patient was discussed with Dr. Andrade and he is in agreement with the above note.

## 2024-05-15 NOTE — PCC CARE MANAGEMENT
Patient admitted to [facility] on 5/1 seizure disorder, lungs ca with mets to brain.   D/c changed to 5/24, to facilitate family training with wife. DME ordered,  [facility] reserved ST, will need to be followed up as outpt , when patient goes outpt with other therapies. Insurance update due today 5/22.

## 2024-05-15 NOTE — PROGRESS NOTES
Patient tolerated chemotherapy tx without incident.  Neulasta OnPro applied to TAM & blinking green.  Instructed Maren Schoenberger RN that onpro can be removed tomorrow night at 8 pm.  Port de-accessed and flushed per protocol.

## 2024-05-15 NOTE — PROGRESS NOTES
Physical Medicine and Rehabilitation Progress Note  Paras Pitts 68 y.o. male MRN: [id number]  Unit/Bed#: -02 Encounter: [id number]      Assessment & Plan:     Decline in ADLs and mobility: Functional assessment - improving         FIM  Care Score  Admit Score Recent Score    Total assist  1-100% or 2p    Tot Bathing, LBD LBD   Max assist 2-51-75%    Sub UBD To hygiene    Mod assist 3- 26-50%  Par     Min assist 3- 25% or < Par     CG assist 4  TA  UBD   Sup/Setup 4-5 Sup     Mod-I/Indep 6 MI      Transfers  Total  Significant-mod assist     Ambulation   Total  Total (Min-mod with 2nd person CF)    WC mob  Mod assist 150 ft  Supervision     Stairs   Total assist/NT      Goal: CGA-supervision for most ADLs and for WC mobility except possible increased assist for LBD/bathing  Major barriers:  Cog impairment, imbalance, deconditioning, risk of complications  Dispo: Home with [facility] Fri 5/24 with  PT, OT, ST, RN and wife   - Recommend CG training      Brain mass  Assessment & Plan  5/15 neuroexam stable  Patient with known metastatsis to right frontal lobe s/p right frontal craniotomy in March 2023  Avastin scheduled for today -patient reports increased malaise and activity intolerance about  days after treatments typically that last 1 to 2 days - fall precautions, monitor closely  H-O recommending increasing dex to 8mg BID x2 days   Has been on Dexamethasone taking 4mg qday per prior providers recently for vasogenic edema (appears to be on 2mg BID prior at home based on chart review)   Residual mild left hemiparesis but remained highly functional and independent.  Patient status post chemotherapy radiation SRT, immunotherapy  Follows with Dr. Langford    * Seizure disorder (HCC)  Assessment & Plan  No evidence of seizures recently    New onset seizures presenting for 2024  CT head showing right frontal lobe vasogenic edema with known underlying lesion.    MRI brain showing a right superior frontal mass resection and  chemoradiation with unchanged linear enhancement along the surgical cavity compared to 3/26/2024 favoring radiation necrosis.    Patient seen by neurology.    Video EEG showing: Early in recording there was frequent or nearly continuous focal motor seizure and later there were two subclinical right fronto central electrographic seizures.    Patient placed on the following AED regimen: Keppra 2 g twice daily, Vimpat 200 mg twice daily, Onfi 5 mg twice daily.  *Of note patient was significantly sedated with Ativan.  (Patient also on Mirapex 0.25mg TID presumable for tremors or RLS)  Continue seizure precautions     Patient to follow-up with neurology-epileptologist as an outpatient.    Cough  Assessment & Plan  Continues to improve   Comgmt with IM team  Monitor lung exam, vitals with and without activity  Encourage incentive spirometry/flutter valve   Pt afebrile no leukocytosis; saturating well on RA   Covid/Flu/RSV PCR negative 5/13  CXR 5/14 - Right basilar linear opacity, likely atelectasis. Right upper lobectomy. 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  Dexamethasone taking 4mg qday per prior providers recently for vasogenic edema (appears to be on 2mg BID prior at home based on chart review)   Patient is under the care of Dr. Langford for medical oncology and is to begin Avastin, chemotherapy radiation therapy for his recurrent mediastinal disease post rehabilitation.  Avastin Infusion 5/15/2024.   Infusion center to set up chemo treatment    CBC, CMP, UA on Monday 5/13   Essentially unremarkable  Recent increased cough - comgmt per IM > improving  Covid/flu/RSV negative 5/13  CXR 5/14 - Right basilar linear opacity, likely atelectasis. Right upper lobectomy.  Encourage IS, flutter      Cognitive impairment  Assessment & Plan  Cog stable to improving 5/15   Did have fall 5/11 when he tried to reach down to  urinal - MD young without signs of injury and patient continues to deny injury from fall  - High fall precautions with frequent rounding - if increased safety concerns consider q15 checks or 1:1  - MOCA completed 5/9 - obtain results   - Increased risk of delirium - monitor   - Patient/family/CG teaching   - Optimal Sleep/wake cycle management  - Limit sedating/deliriogenic meds when possible  - Optimal medical, mood, and pain management   - Skilled therapies as outlined   - Compensatory strategies   - Optimize oversight after discharge  - OP follow-up with PCP and neuro      Left hemiparesis (HCC)  Assessment & Plan  Stable   Worsening left hemiparesis likely related to recent radiation necrosis and vasogenic edema, seizures  Decadron for hx of vasogenic edema  Continue acute rehabilitation program to maximize function      History of CVA (cerebrovascular accident)  Assessment & Plan  Patient with ischemic CVA in 2011  Resultant mild left hemiparesis    Bilateral edema of lower extremity  Assessment & Plan  Stable; if worsens or becomes painful obtain BLE venous doppler   1+ Pitting edema bilaterally likely secondary to increased dependent position.    On Lovenox for DVT prophylaxis   TEDS daily  Elevate limbs more    Adjustment disorder  Assessment & Plan  Supportive counseling  Consider neuropsychology consultation if needed     Pseudobulbar affect  Assessment & Plan  Mood is much improved without emotional lability noted (week of 5/6/2024)  On admission at times was tearful and emotionally labile  May benefit from a future SSRI if  continues      Sore throat  Assessment & Plan  Improved; Covid/RSV/flu negative 5/13   PRN Robitussin ordered    Constipation  Assessment & Plan  Miralax qday  Declines supp  If needed add senna      Hypercholesterolemia  Assessment & Plan  Continue Lipitor 40 mg daily (home dose)    Essential hypertension  Assessment & Plan  Continue Norvasc 10 mg daily and losartan 50 mg daily (home dose)  Monitor BP and heart rate during rest and with activity  Internal medicine managing    Overweight (BMI 25.0-29.9)  Assessment & Plan  Dietary and lifestyle changes when able        Other Medical Issues:  Monitor for     Follow-up providers and other issues to be followed up after discharge  PCP  Neuro  H-O  Other chronic providers    CODE: Level 1: Full Code per prior providers    Restrictions include:  Fall precautions    Objective:    Allergies per EMR  Diagnostic Studies: Reviewed, no new imaging  XR chest 1 view   Final Result by Jerad Castaneda MD (05/14 1010)      Right basilar linear opacity, likely atelectasis.      Right upper lobectomy.         Workstation performed: [id number]           See above as well    Laboratory: Labs reviewed  Results from last 7 days   Lab Units 05/13/24  0550   HEMOGLOBIN g/dL 12.0   HEMATOCRIT % 36.6   WBC Thousand/uL 7.98     Results from last 7 days   Lab Units 05/13/24  0550   BUN mg/dL 12   SODIUM mmol/L 141   POTASSIUM mmol/L 3.8   CHLORIDE mmol/L 103   CREATININE mg/dL 0.74   AST U/L 14   ALT U/L 21            Drug regimen reviewed, all potential adverse effects identified and addressed:    Scheduled Meds:  Current Facility-Administered Medications   Medication Dose Route Frequency Provider Last Rate    acetaminophen  975 mg Oral Q8H PRN Jenise Dawson PA-C      alteplase  2 mg Intracatheter Q1MIN PRN Collin Langford MD      aluminum-magnesium hydroxide-simethicone  30 mL Oral Q6H PRN Jenise Dawson PA-C      amLODIPine  10 mg Oral Daily Jenise Dawson PA-C       "atorvastatin  40 mg Oral After Dinner Jenise Dawson PA-C      bisacodyl  10 mg Rectal Daily PRN Omer Andrade MD      cloBAZam  5 mg Oral BID Jenise Dawson PA-C      [START ON 5/17/2024] dexamethasone  4 mg Oral Daily Layton Yoder MD      dexamethasone  8 mg Oral BID Omer Andrade MD      dextromethorphan-guaiFENesin  10 mL Oral Q4H PRN Corina rAita MD      docusate sodium  100 mg Oral BID PRN Corina Arita MD      enoxaparin  40 mg Subcutaneous Daily Corina Arita MD      guaiFENesin  600 mg Oral Q12H [facility] Omer Andrade MD      lacosamide  200 mg Oral Q12H LEN Jenise Dawson PA-C      levETIRAcetam  2,000 mg Oral Q12H LEN Jenise Dawson PA-C      losartan  50 mg Oral Daily Jenise Dawson PA-C      ondansetron  4 mg Oral Q6H PRN Jenise Dawson PA-C      pantoprazole  40 mg Oral Early Morning Jenise Dawson PA-C      polyethylene glycol  17 g Oral Daily PRN Corina Arita MD      polyethylene glycol  17 g Oral Daily Layton Yoder MD      pramipexole  0.25 mg Oral TID Jenise Dawson PA-C         Chief Complaints:  Rehab follow-up     Subjective:     On eval, patient reports cough continues to improve.  Denies shortness of breath, worsening strength or sensation, fever, chills, sweats, or other new complaints.    ROS: A 10 point ROS was performed; negative except as noted above.       Physical Exam:  Vitals:    05/15/24 1149   BP: 118/72   Pulse: 76   Resp:    Temp:    SpO2:      GEN:  Sitting in NAD   HEENT/NECK: MMM  CARDIAC: Regular rate rhythm, no murmers, no rubs, no gallops  LUNGS:   no rhonchi or wheeze or rales  ABDOMEN: Soft, non-tender, non-distended, normal active bowel sounds  EXTREMITIES/SKIN:  BLE mild edema without calf TTP unchanged  NEURO:   MENTAL STATUS: Improving interaction, orientation intact and Strength/MMT:  Unchanged  PSYCH:  Affect:  Euthymic    PMR CS HPI:  \"Paras Pitts is a 68 " "y.o. 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  CT head showing right frontal lobe vasogenic edema with known underlying lesion.  MRI brain showing a right superior frontal mass resection and chemoradiation with unchanged linear enhancement along the surgical cavity compared to 3/26/2024 favoring radiation necrosis.  Patient seen by neurology.  Video EEG showing: Early in recording there was frequent or nearly continuous focal motor seizure and later there were two subclinical right fronto central electrographic seizures.  Patient placed on the following AED regimen: Keppra 2 g twice daily, Vimpat 200 mg twice daily, Onfi 5 mg twice daily.  Of note patient was significantly sedated with Ativan.   Patient to follow-up with epileptologist as an outpatient.  Patient was seen by radiation oncology regarding edema and radiation necrosis and recommended to increase dexamethasone to 4 mg twice daily.  Patient is under the care of Dr. Langford for medical oncology and is to begin Avastin, chemotherapy radiation therapy for his recurrent mediastinal disease post rehabilitation.  After medical stabilization, patient found to have acute functional deficits from his mobility and self-care, therefore admitted to [facility] for acute inpatient rehabilitation.\"    ** Please Note: Fluency Direct voice to text software may have been " used in the creation of this document. **    Time spent counseling/coordinating care. Counseling includes discussion with patient re: progress in therapies, functional issues observed by therapy staff, and discussion with patient his/her current medical state/wellbeing. Coordination of patient's care was performed in conjunction with Internal Medicine service to monitor patient's labs, vitals, and management of their comorbidities. In addition, this patient was discussed by the interdisciplinary team in weekly case conference today. The care of the patient was extensively discussed with all care providers and an appropriate rehabilitation plan was formulated unique for this patient. Barriers were identified preventing progression of therapy and appropriate interventions were discussed with each discipline. Please see the team note for input from all disciplines regarding barriers, intervention, and discharge planning.

## 2024-05-15 NOTE — PROGRESS NOTES
05/15/24 1400   Pain Assessment   Pain Assessment Tool 0-10   Pain Score No Pain   Speech/Language/Cognition Assessmetn   Treatment Assessment deductive reasoning 2x4 and abstract divergent categorization provided 1 letter to complete and review tomorrow. Conversing with visitors without any word finding difficulties. Followed up to 2 step directions   SLP Therapy Minutes   SLP Time In 1400   SLP Time Out 1410   SLP Total Time (minutes) 10   SLP Mode of treatment - Individual (minutes) 10   SLP Mode of treatment - Concurrent (minutes) 0   SLP Mode of treatment - Group (minutes) 0   SLP Mode of treatment - Co-treat (minutes) 0   SLP Mode of Treatment - Total time(minutes) 10 minutes   SLP Cumulative Minutes 585

## 2024-05-15 NOTE — PROGRESS NOTES
05/15/24 0859   Pain Assessment   Pain Assessment Tool 0-10   Pain Score No Pain   Restrictions/Precautions   Precautions Bed/chair alarms;Cognitive;Fall Risk;Multiple lines;Supervision on toilet/commode;Seizure  (port R chest wall for chemo)   Braces or Orthoses   (DF assist wrap)   Cognition   Overall Cognitive Status Impaired   Arousal/Participation Alert;Responsive;Cooperative   Attention Attends with cues to redirect   Orientation Level Oriented X4   Memory Decreased short term memory;Decreased recall of precautions   Following Commands Follows one step commands without difficulty   Sit to Stand   Type of Assistance Needed Physical assistance;Verbal cues   Physical Assistance Level 26%-50%   Comment min-mod A STS with stair rail   Sit to Stand CARE Score 3   Bed-Chair Transfer   Type of Assistance Needed Physical assistance;Verbal cues   Physical Assistance Level 26%-50%   Comment mod A sit pivot wheelchair<>hospital bed   Chair/Bed-to-Chair Transfer CARE Score 3   Walk 10 Feet   Type of Assistance Needed Physical assistance;Verbal cues   Physical Assistance Level Total assistance   Comment mod A x 1 level surfaces; second person for chair follow; improved LLE swing with improved step length noted today as well as improved weight shift and overall improved gait cycle   Walk 10 Feet CARE Score 1   Walk 50 Feet with Two Turns   Type of Assistance Needed Physical assistance;Verbal cues   Physical Assistance Level Total assistance   Comment mod A x 1 level surfaces; second person for chair follow; improved LLE swing with improved step length noted today as well as improved weight shift and overall improved gait cycle   Walk 50 Feet with Two Turns CARE Score 1   Ambulation   Primary Mode of Locomotion Prior to Admission Walk   Distance Walked (feet) 61 ft  (59' 61' 46')   Assist Device Platform;Roller Walker   Gait Pattern Inconsistant [place];Slow [place];Decreased foot clearance;L foot drag;Forward Flexion;L  hemiparesis;Narrow ABHINAV;Step to;Decreased L stance;Improper weight shift   Limitations Noted In Balance;Coordination;Device Management;Endurance;Heel Strike;Posture;Safety;Sequencing;Speed;Strength;Swing   Provided Assistance with: Balance;Trunk Support;Weight Shift   Walk Assist Level Moderate Assist;Chair Follow   Findings mod A x 1 level surfaces; second person for chair follow; improved LLE swing with improved step length noted today as well as improved weight shift and overall improved gait cycle   Does the patient walk? 2. Yes   Wheel 50 Feet with Two Turns   Type of Assistance Needed Supervision;Verbal cues   Comment S level and unlevel surfaces; increased time to complete   Wheel 50 Feet with Two Turns CARE Score 4   Wheel 150 Feet   Type of Assistance Needed Supervision;Verbal cues   Comment S level and unlevel surfaces; increased time to complete   Wheel 150 Feet CARE Score 4   Wheelchair mobility   Does the patient use a wheelchair? 1. Yes   Type of Wheelchair Used 1. Manual   Method Right upper extremity;Right lower extremity   Assistance Provided For Remove Leg Rest;Replace Leg Rest;Remove armrests;Replace armrests   Distance Level Surface (feet) 201 ft  (201' 145')   Distance Wheeled 3% Grade 12 ft   Findings S level and unlevel surfaces; increased time to complete   Therapeutic Interventions   Strengthening standing ther ex focusing on   Balance gait and transfer training   Other wheelchair mobility   Assessment   Treatment Assessment Patient agreeable to therapy session.   No pain reported.   Cues for general safety.  Patient with noted improvement in gait training today as demonstrated by increased distance walked, improved weight shift, and improved swing LLE.  Patient required decreased assistance to advand LLE during gait cycle.   Completed ther ex for general LE strengthening as well as improved upright posture and LLE control; gait and transfer training focusing on sequence and technique for  improved balance and safety with functional mobility using PFRW.  Propels wheelchair level and unlevel surfaces with S and increased time to complete.   PT Barriers   Physical Impairment Decreased strength;Decreased range of motion;Decreased endurance;Impaired balance;Decreased mobility;Decreased coordination;Decreased safety awareness;Impaired tone   Functional Limitation Wheelchair management;Walking;Transfers;Standing;Stair negotiation;Ramp negotiation;Car transfers   Plan   Treatment/Interventions Functional transfer training;LE strengthening/ROM;Therapeutic exercise;Bed mobility;Gait training  (wheelchair mobility)   Progress Slow progress, decreased activity tolerance   PT Therapy Minutes   PT Time In 0859   PT Time Out 1030   PT Total Time (minutes) 91   PT Mode of treatment - Individual (minutes) 91   PT Mode of treatment - Concurrent (minutes) 0   PT Mode of treatment - Group (minutes) 0   PT Mode of treatment - Co-treat (minutes) 0   PT Mode of Treatment - Total time(minutes) 91 minutes   PT Cumulative Minutes 1108   Therapy Time missed   Time missed? No

## 2024-05-15 NOTE — PROGRESS NOTES
05/15/24 0700   Pain Assessment   Pain Assessment Tool 0-10   Pain Score No Pain   Restrictions/Precautions   Precautions Bed/chair alarms;Cognitive;Fall Risk;Multiple lines;Supervision on toilet/commode;Seizure  (port R chest wall for chemo)   Weight Bearing Restrictions No   ROM Restrictions No   Eating   Type of Assistance Needed Set-up / clean-up   Eating CARE Score 5   Eating Assessment   Food To Mouth Yes   Able To Cut Yes   Noted Coughing   Positioning Upright;Out of Bed   Meal Assessed Breakfast   Opens Packages No   Oral Hygiene   Type of Assistance Needed Set-up / clean-up   Physical Assistance Level No physical assistance   Oral Hygiene CARE Score 5   Grooming   Able To Acquire Items;Comb/Brush Hair;Wash/Dry Face;Brush/Clean Teeth;Wash/Dry Hands   Limitation Noted In Coordination;Problem Solving;Safety;Strength   Shower/Bathe Self   Type of Assistance Needed Physical assistance   Physical Assistance Level 26%-50%   Comment min-modA to wash/dry buttocks and maintain balance while standing   Shower/Bathe Self CARE Score 3   Bathing   Assessed Bath Style Sponge Bath   Anticipated D/C Bath Style Shower;Sponge Bath   Able to Gather/Transport No   Able to Wash/Rinse/Dry (body part) Left Arm;Right Arm;L Upper Leg;R Upper Leg;Chest;Abdomen;Perineal Area;L Lower Leg/Foot;R Lower Leg/Foot   Limitations Noted in Balance;Coordination;Endurance;Problem Solving;ROM;Safety;Strength   Positioning Seated;Standing   Tub/Shower Transfer   Reason Not Assessed Sponge Bath   Upper Body Dressing   Type of Assistance Needed Physical assistance   Physical Assistance Level 26%-50%   Comment Alek to don and Alek to doff   Upper Body Dressing CARE Score 3   Lower Body Dressing   Type of Assistance Needed Physical assistance   Physical Assistance Level 51%-75%   Comment pt able to don/doff undergarment. Pt required modA to don pants. Pt assisted to pull up undergarment/pants   Lower Body Dressing CARE Score 2   Putting On/Taking  Off Footwear   Type of Assistance Needed Physical assistance;Verbal cues   Physical Assistance Level 51%-75%   Comment pt required min-modA assist to don L sock today and required maxA to don TEDs. Pt able to present bilat feet   Putting On/Taking Off Footwear CARE Score 2   Picking Up Object   Reason if not Attempted Safety concerns   Picking Up Object CARE Score 88   Dressing/Undressing Clothing   Remove UB Clothes Pullover Shirt   Don UB Clothes Pullover Shirt   Remove LB Clothes Undergarment;Socks   Don LB Clothes Pants;Socks;Undergarment;TEDs   Limitations Noted In Balance;Coordination;Endurance;Problem Solving;Safety;Strength;ROM   Positioning Supported Sit;Standing   Roll Left and Right   Type of Assistance Needed Physical assistance   Physical Assistance Level 76% or more   Roll Left and Right CARE Score 2   Lying to Sitting on Side of Bed   Type of Assistance Needed Physical assistance   Physical Assistance Level 76% or more   Lying to Sitting on Side of Bed CARE Score 2   Sit to Stand   Type of Assistance Needed Physical assistance   Physical Assistance Level 26%-50%   Comment pt required less assist when able to pull on surface to help him get up such as grab bar or sink. Pt requires more assist for transfer from bed.   Sit to Stand CARE Score 3   Bed-Chair Transfer   Type of Assistance Needed Physical assistance   Physical Assistance Level 51%-75%   Comment attempted left side EOB to w/c transfer which was unsuccessful. Pt required mod-maxA right side EOB to w/c transfer.   Chair/Bed-to-Chair Transfer CARE Score 2   Transfer Bed/Chair/Wheelchair   Positioning Concerns Cognition;Other  (hemiparesis L side)   Toileting Hygiene   Type of Assistance Needed Physical assistance   Physical Assistance Level 51%-75%   Comment Pt able to pull undergarment down. Required mod-maxA to maintain balance on raised toilet while pt completed hygiene, as pt leaned significantly to the left   Toileting Hygiene CARE Score 2    Toileting   Able to Pull Clothing down yes, up no.   Manage Hygiene Bladder;Bowel   Limitations Noted In Balance;Coordination;ROM;Safety;UE Strength;LE Strength;Problem Solving   Adaptive Equipment Grab Bar   Toilet Transfer   Type of Assistance Needed Physical assistance   Physical Assistance Level Total assistance   Comment assist x2 from w/c to raised toilet with OT manually moving LLE in a stand pivot motion. mod-maxA x1 from raised toilet to w/c   Toilet Transfer CARE Score 1   Toilet Transfer   Surface Assessed Raised Toilet   Transfer Technique Stand Pivot   Limitations Noted In Balance;Endurance;Problem Solving;ROM;Safety;UE Strength;LE Strength   Adaptive Equipment Grab Bar   Positioning Concerns Safety;Cognition   Cognition   Overall Cognitive Status Impaired   Arousal/Participation Alert;Responsive;Cooperative   Attention Attends with cues to redirect   Orientation Level Oriented X4   Memory Decreased recall of precautions;Decreased short term memory   Following Commands Follows one step commands without difficulty   Activity Tolerance   Activity Tolerance Patient tolerated treatment well   Assessment   Treatment Assessment Pt agreeable to skilled OT session this AM, focusing on ADL training and fxl transfers. Pt recieved lying supine in bed. Pt required increased assist to get out of bed this morning when compared to previous sessions; pt required increased assist to roll onto R side and to complete lying to sitting EOB transfer. Pt able to complete hygiene on raised toilet today with assist from OTS to maintain balance while seated. Pt demonstrated poor carryover of adaptive strategies, transfer techniques,and CHG wipe techniques; these procedures and techniques are completed daily by pt with OT and other staff. Pt continues with barriers of decreased strength throughout but especially L side s/p previous CVA, decreased balance, impaired coordination L side, impaired safety awareness, problem solving,  and attention, and decreased activity tolerance; all affect independence in self care and fxl transfers. Pt would benefit from continued skilled OT services in order to address listed barriers and prepare for safe d/c.   Prognosis Good   Problem List Decreased strength;Decreased range of motion;Decreased endurance;Impaired balance;Decreased coordination;Decreased cognition;Impaired judgement;Decreased safety awareness;Impaired tone   Plan   Treatment/Interventions ADL retraining;Functional transfer training;Therapeutic exercise;Endurance training;Cognitive reorientation;Patient/family training;Equipment eval/education;Bed mobility;Compensatory technique education;Spoke to nursing;OT   Progress Slow progress, decreased activity tolerance   OT Therapy Minutes   OT Time In 0700   OT Time Out 0830   OT Total Time (minutes) 90   OT Mode of treatment - Individual (minutes) 90   Therapy Time missed   Time missed? No

## 2024-05-15 NOTE — TEAM CONFERENCE
Acute RehabilitationTeam Conference Note  Date: 5/15/2024   Time: 10:55 AM       Patient Name:  Paras Pitts       Medical Record Number: [id number]   YOB: 1955  Sex: Male          Room/Bed:  [facility] [place] [facility] [place] [facility]-02  Payor Info:  Payor: MEDICARE / Plan: MEDICARE PART A ONLY / Product Type: Medicare /      Admitting Diagnosis: Brain mass [G93.89]   Admit Date/Time:  5/1/2024  5:56 PM  Admission Comments: No comment available     Primary Diagnosis:  Seizure disorder (HCC)  Principal Problem: Seizure disorder (HCC)    Patient Active Problem List    Diagnosis Date Noted    Cough 05/14/2024    Cognitive impairment 05/14/2024    Bilateral edema of lower extremity 05/10/2024    Sore throat 05/08/2024    Frontal mass of brain 05/02/2024    Seizure disorder (HCC) 05/02/2024    Left hemiparesis (HCC) 05/02/2024    Pseudobulbar affect 05/02/2024    Adjustment disorder 05/02/2024    Constipation 04/25/2024    Seizure-like activity (HCC) 04/23/2024    Generalized weakness 04/20/2024    Tremors of nervous system 04/20/2024    History of CVA (cerebrovascular accident) 06/22/2023    Chemotherapy-induced neutropenia (HCC) 05/03/2023    Encounter for central line care 05/01/2023    Carcinoma of right lung (HCC) 04/13/2023    Lung nodule 03/21/2023    Brain mass 03/20/2023    Metastatic adenocarcinoma (HCC) 2023    Hypercholesterolemia 09/20/2021    Essential hypertension 09/11/2020    Annual physical exam 09/11/2020    Negative depression screening 02/24/2020    Overweight (BMI 25.0-29.9) 02/24/2020       Physical Therapy:    Weight Bearing Status: Weight Bearing as Tolerated  Transfers: Minimal Assistance, Moderate Assistance (min-mod A sit pivot surface to surfaces; MOD A STS with PFRW)  Bed Mobility: Moderate Assistance  Amulation Distance (ft): 56 feet  Ambulation: Moderate Assistance (second person for wheelchair follow)  Assistive Device for Ambulation:  (PFRW)  Wheelchair Mobility Distance: 194 ft  Wheelchair  Mobility: Supervision  Number of Stairs: 3  Assistive Device for Stairs: Bilateral Hand Rails  Stair Assistance: Assist of 2 (mod -max A x 1 plus min A of another going up forward and down backwards)  Discharge Recommendations: Home with:  DC Home with:: 24 Hour Assisteance, Family Support, First Floor Setup, Home Physical Therapy    05/08/2024:   Patient motivated and participating in therapy.   Patient MOD-MAX A bed mobility, MOD A STS with PFRW, MOD A sit pivot surface to surfaces using no AD, MOD-MAX A SPT using grab bar(in bathroom), MOD A ambulation up to 33' level surfaces with PFRW and wheelchair follow, MIN A wheelchair mobility 197' level and unlevel  surfaces.  Steps not assessed.   Patient limited by decreased ROM/strength, decreased balance and safety, decreased endurance, impaired LE tone.  Patient may benefit from continued inpatient ARC PT to increase function, safety, and improved independence in prep for safe d/c to home with family and continued PT services as needed.    05/15/2024:   Patient participating in therapy and making slow, but positive gains.   Patient MOD A bed mobility, MIN-MOD A sit pivot transfers surface to surface, MOD A STS with PFRW, S wheelchair mobility level surfaces, MOD A x1 ambulation up to 56' with PFRW with second person for wheelchair follow, mod-max A x 1 plus min A of 1 negotiation of 3 steps with 2 handrails going up forward and down backwards.   Patient limited by decreased ROM/strength, decreased balance and safety, decreased endurance, impaired LE tone.  Patient may benefit from continued inpatient ARC PT to increase function, safety, and improved independence in prep for safe d/c to home with family and continued PT services as needed.    Occupational Therapy:  Eating: Supervision  Grooming: Supervision  Bathing: Moderate Assistance, Minimal Assistance  Bathing: Moderate Assistance, Minimal Assistance  Upper Body Dressing: Minimal Assistance  Lower Body Dressing:  Moderate Assistance  Toileting: Moderate Assistance  Tub/Shower Transfer:  (TBA)  Toilet Transfer: Assist of 2  Cognition: Exceptions to WNL  Cognition: Decreased Attention, Decreased Safety, Decreased Memory  Orientation: Person, Place, Time, Situation  Discharge Recommendations: Home with:  DC Home with:: First Floor Setup, Family Support, Home Occupational Therapy       5/7/24: Pt's current LOF listed above. Pt's barriers include : decreased balance, endurance, generalized strength, ROM, coordination, cognition (attention, comprehension, problem solving), and safety awareness. Pt currently participating in TE, TA, ADL training, fxl transfers/mobility, cognitive training, NMR, and activity tolerance in order to progress towards goals. Pt would benefit from continued skilled OT to address barriers and prepare for safe d/c.    5/15/24: Pt's current LOF listed above. Pt's barriers include : decreased balance, endurance, generalized strength, ROM, coordination, cognition (attention, comprehension, problem solving), and safety awareness. Pt currently participating in TE, TA, ADL training, fxl transfers/mobility, cognitive training, NMR, and activity tolerance in order to progress towards goals. Pt would benefit from continued skilled OT to address barriers and prepare for safe d/c.    Speech Therapy:  Mode of Communication: Verbal  Cognition: Exceptions to WNL  Cognition: Decreased Memory, Decreased Executive Functions, Decreased Attention  Orientation: Person, Place, Time, Situation  Swallowing: Within Defined Limits  Diet Recommendations: Regular Diet, Thin  Discharge Recommendations: Home with:  DC Home with:: Family Support, Outpatient Speech Therapy  5/8   Comprehension   Assist Devices Glasses   Comprehension (FIM) 5 - Needs help/cues, repetition only RARELY ( < 10% of the time)   Expression   Expression (FIM) 5 - Needs help/cues only RARELY (< 10% of the time)   Social Interaction   Social Interaction (FIM) 5 -  Interacts appropriately with others 90% of time   Problem Solving   Problem solving (FIM) 5 - Solves basic problems 90% of time   Memory   Memory (FIM) 5 - Recalls/performs request 90% of time   Sensation   Additional Comments Pt reports numbless L side ( baseline)       Cognitive Linguisitic Assessments   The Repeatable Battery for the Assessment of Neuropsychological Status (RBANS)  The Repeatable Battery for the Assessment of Neuropsychological Status (RBANS) is a brief, individually-administered assessment which measures attention, language, visuospatial/constructional abilities, and immediate & delayed memory. The RBANS is intended for use with adolescents to adults, ages 12 to 89 years. The following results were obtained during the administration of the assessment.     Form: A  “IE” indicates the scores from the initial evaluation (4/4/23). Form: A     Cognitive Domain/Subtest: Index Score: Percentile Rank: Classification: IE Index Score: Status:   IMMEDIATE MEMORY 81 10%ile Low Average 103 DECLINE        1. List Learning (21/40)            2. Story Memory (12/24)               VISUOSPATIAL/  CONSTRUCTIONAL 102 55%ile Average 109 NO CHANGE        3. Figure Copy (20/20)            4. Line Orientation (13/20)               LANGUAGE 82 12%ile Low Average 85 NO CHANGE        5. Picture Naming (10/10)            6. Semantic Fluency (8/40)               ATTENTION 82 12%ile Low Average 91 DECLINE        7. Digit Span (8/16)            8. Coding (28/89)               DELAYED MEMORY 101 53%ile Average 112 DECLINE        9. List Recall (5/10)            10. List Recognition (19/20)            11. Story Recall (7/12)            12. Figure Recall (13/20)                Sum of Index Scores:  448   500 DECLINE   Total Score:  85         Percentile: 16%ile         Classification: Low Average                           5/15   UPDATED FIM LEVELS   Comprehension   Assist Devices Glasses   Comprehension (FIM) 6 - Has only MILD  difficulty with complex/abstract info   Expression   Expression (FIM) 6 - Expresses complex/abstract but requires:  more time   Social Interaction   Social Interaction (FIM) 6 - Interacts appropriately with others BUT requires extra  time   Problem Solving   Problem solving (FIM) 5 - Solves basic problems 90% of time   Memory   Memory (FIM) 5 - Recalls/performs request 90% of time       Nursing Notes:  Appetite: Good  Diet Type: Cardiac                      Diet Patient/Family Education Complete: Yes                         Type of Wound Patient/Family Education: Yes  Bladder: 2 - Maximal Assistance     Bladder Patient/Family Education: Yes  Bowel: 2 - Maximal Assistance     Bowel Patient/Family Education: Yes  Pain Location/Orientation: Location: Abdomen  Pain Score: 0                       Hospital Pain Intervention(s): Repositioned, Rest  Pain Patient/Family Education: Yes (Tylenol Q8hrs)  Medication Management/Safety  Injectable: Lovenox  Safe Administration: Yes  Medication Patient/Family Education Complete: Yes    5/6/24 Patient is a recent admission to [facility] [place] with seizure disorder.  Patient was still actively receiving cancer treatments prior to hospitalization.  Patient remains alert and oriented. Left sided weakness continues.  Trace lower extremity edema.  Patient remains continent of bowel and bladder.  Skin remains intact with just scattered bruising noted.  Patient requires two assist for transfers.    5/13/24  New onset tremors LUE, LLE thought to be partial seizures improved with vimpat, keppra, and onfi. Pt with lung cancer with mets to the brain and mediastinal lymph nodes. S/p brain resection of the tumor 3/23. Right chest wall port unaccessed: still receiving chemo and radiation prior to hospitalization-plan to restart after rehab. Hx: Stroke, HTN, Cancer recent lung and brain lesions, prostate CA. Left upper extremity weak and LLE very weak. SPT x2 for transfers. A&Ox4, HRR, lungs diminished with  scattered crackles and wheezing. Negative for Flu/Covid. Pending CXR on 5/14/24 to rule out pneumonia. Edema-b/l le, LLE>RLE TEDS. Fell from chair on 5/11/24. Chemo infusion scheduled for Wednesday 5/15-infusion will set up.        Case Management:     Discharge Planning  Living Arrangements: Lives w/ Spouse/significant other  Support Systems: Spouse/significant other, Friends/neighbors, Friend, Family members  Assistance Needed: n/a  Type of Current Residence: Private residence  Current Home Care Services: No  Patient admitted to [facility] on 5/1 seizure disorder, lungs ca with mets to brain. Patient lives with wife , has 2 residences, will be staying in stud on first floor. Wife is converting the first floor into a bedroom. Patient 's wife works in [place], can work remotely from home, when patient is discharged.  D/c date is 5/22,  Insurance review due today 5/15. Chemo infusion scheduled for today 5/15, working out the details.    Is the patient actively participating in therapies? yes  List any modifications to the treatment plan: na    Barriers Interventions   Atelectasis, lung CA with mets, seizure disorder Medical management and oversight, reqs chemotherapy   Decreased cog ST strategies, ADL/transfer/gait training, compensatory strategies   Decreased balance, end ADL, transfer, gait training   Decreased ROM, strength, coordination Therapeutic exercise, therapeutic activity         Is the patient making expected progress toward goals? yes  List any update or changes to goals: na    Medical Goals: Patient will be able to manage medical conditions and comorbid conditions with medications and follow up upon completion of rehab program    Weekly Team Goals:   Rehab Team Goals  ADL Team Goal: Patient will require assist with ADLs with least restrictive device upon completion of rehab program  Bowel/Bladder Team Goal: Patient will be independent with bladder/bowel management with least restrictive device upon completion of  rehab program  Transfer Team Goal: Patient will require assist with transfers with least restrictive device upon completion of rehab program  Locomotion Team Goal: Patient will require assist with locomotion with least restrictive device upon completion of rehab program  Cognitive Team Goal: Patient will return to premorbid level of cognitive activity upon completion of rehab program    Min assist Bed mobility, transfers, and mobility  Mod I memory and problem solving   Min assist self care  Complete family training     Health and wellness: 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

## 2024-05-15 NOTE — PROGRESS NOTES
05/15/24 1125   Pain Assessment   Pain Assessment Tool 0-10   Pain Score No Pain   Restrictions/Precautions   Precautions Bed/chair alarms;Cognitive;Fall Risk;Supervision on toilet/commode   Comprehension   Comprehension (FIM) 6 - Has only MILD difficulty with complex/abstract info   Expression   Expression (FIM) 6 - Expresses complex/abstract but requires:  more time   Social Interaction   Social Interaction (FIM) 6 - Interacts appropriately with others BUT requires extra  time   Problem Solving   Problem solving (FIM) 5 - Solves basic problems 90% of time   Memory   Memory (FIM) 5 - Needs cueing reminders <10%   Memory Skills   Orientation Level Oriented X4   Speech/Language/Cognition Assessmetn   Treatment Assessment Pt able to recall how far he walked in PT. Sorted alternating 3 different categories sustained across 8:18 mod cue initially with independent carryover with 5 errors. Rush hour level 1 and 2 solved occasional min cue up to 6:00 for each level. Auditory mental manipulation: rescrambled 3 letters provided into words @ 72% dasia.   SLP Therapy Minutes   SLP Time In 1120   SLP Time Out 1150   SLP Total Time (minutes) 30   SLP Mode of treatment - Individual (minutes) 30   SLP Mode of treatment - Concurrent (minutes) 0   SLP Mode of treatment - Group (minutes) 0   SLP Mode of treatment - Co-treat (minutes) 0   SLP Mode of Treatment - Total time(minutes) 30 minutes   SLP Cumulative Minutes 575

## 2024-05-16 PROCEDURE — 97112 NEUROMUSCULAR REEDUCATION: CPT

## 2024-05-16 PROCEDURE — 97530 THERAPEUTIC ACTIVITIES: CPT

## 2024-05-16 PROCEDURE — 92507 TX SP LANG VOICE COMM INDIV: CPT | Performed by: NURSE PRACTITIONER

## 2024-05-16 PROCEDURE — 97110 THERAPEUTIC EXERCISES: CPT

## 2024-05-16 PROCEDURE — 97116 GAIT TRAINING THERAPY: CPT

## 2024-05-16 PROCEDURE — 99232 SBSQ HOSP IP/OBS MODERATE 35: CPT

## 2024-05-16 RX ADMIN — PRAMIPEXOLE DIHYDROCHLORIDE 0.25 MG: 0.12 TABLET ORAL at 16:28

## 2024-05-16 RX ADMIN — PANTOPRAZOLE SODIUM 40 MG: 40 TABLET, DELAYED RELEASE ORAL at 06:03

## 2024-05-16 RX ADMIN — DEXAMETHASONE 8 MG: 4 TABLET ORAL at 09:56

## 2024-05-16 RX ADMIN — PRAMIPEXOLE DIHYDROCHLORIDE 0.25 MG: 0.12 TABLET ORAL at 09:55

## 2024-05-16 RX ADMIN — ENOXAPARIN SODIUM 40 MG: 40 INJECTION SUBCUTANEOUS at 09:56

## 2024-05-16 RX ADMIN — LACOSAMIDE 200 MG: 150 TABLET ORAL at 09:55

## 2024-05-16 RX ADMIN — CLOBAZAM 5 MG: 10 TABLET ORAL at 18:32

## 2024-05-16 RX ADMIN — LACOSAMIDE 200 MG: 150 TABLET ORAL at 21:09

## 2024-05-16 RX ADMIN — CLOBAZAM 5 MG: 10 TABLET ORAL at 09:55

## 2024-05-16 RX ADMIN — PRAMIPEXOLE DIHYDROCHLORIDE 0.25 MG: 0.12 TABLET ORAL at 21:08

## 2024-05-16 RX ADMIN — DEXAMETHASONE 8 MG: 4 TABLET ORAL at 21:10

## 2024-05-16 RX ADMIN — GUAIFENESIN 600 MG: 600 TABLET ORAL at 21:09

## 2024-05-16 RX ADMIN — AMLODIPINE BESYLATE 10 MG: 10 TABLET ORAL at 09:55

## 2024-05-16 RX ADMIN — GUAIFENESIN 600 MG: 600 TABLET ORAL at 09:55

## 2024-05-16 RX ADMIN — ATORVASTATIN CALCIUM 40 MG: 40 TABLET, FILM COATED ORAL at 18:32

## 2024-05-16 RX ADMIN — LEVETIRACETAM 2000 MG: 500 TABLET, FILM COATED ORAL at 21:08

## 2024-05-16 RX ADMIN — LEVETIRACETAM 2000 MG: 500 TABLET, FILM COATED ORAL at 09:56

## 2024-05-16 RX ADMIN — LOSARTAN POTASSIUM 50 MG: 50 TABLET, FILM COATED ORAL at 09:55

## 2024-05-16 NOTE — PROGRESS NOTES
"Progress Note - Paras Pitts 68 y.o. male MRN: [id number]    Unit/Bed#: [facility] 213-02 Encounter: [id number]        Subjective:   Patient seen and examined at bedside after reviewing the chart and discussing the case with the caring staff.      Patient examined at bedside.  Patient has no acute symptoms. He is feeling good so far after chemo. States he may not feel the best tomorrow.     Physical Exam   Vitals: Blood pressure 136/79, pulse 74, temperature (!) 97.3 °F (36.3 °C), temperature source Temporal, resp. rate 18, height 5' 9.02\" (1.753 m), weight 95.3 kg (210 lb 1.6 oz), SpO2 93%.,Body mass index is 31.01 kg/m².  Constitutional: Patient in no acute distress.  HEENT: PERR, EOMI, MMM.  Cardiovascular: Normal rate and regular rhythm.    Pulmonary/Chest: Patient has expiratory crackles involving bilateral lung base, breathing appears nonlabored  Abdomen: Soft, + BS, NT.    Assessment/Plan:  Paras Pitts is a(n) 68 y.o. male with tremors of the nervous system.     Cardiac Hx w/ HTN, HLD, hx of CVA.  Continue amlodipine 10 mg daily, Losartan 50 mg daily, atorvastatin 40 mg daily.  Type 2 diabetes mellitus.  Hgb A1c 6.7% on 5/3/24.  Carb controlled diet.   GERD.  Continue Protonix 40 mg daily.  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  Stage [facility].  S/p robotic converted to right thoracotomy and right upper lobectomy on 9/1/2023. S/p bronchoscopy with EBUS at [facility] on 4/16/2024.  Continue Decadron 4 mg daily.  Patient completed [facility] at [facility] 4/29.  Further radiation treatment in 1-2 weeks per oncologist, Dr. Contreras. Avastin infusion 5/15.  Pain and bowel regimen. As per physiatry.  Cough/congestion.  Start Mucinex twice daily along with Robitussin as needed. COVID/RSV/flu test 5/13 found to be negative.  Chest x-ray was negative for pneumonia.  Tremors of the nervous system.  Continue Keppra twice daily, Vimpat 100 mg twice daily, clobazam 0.5 mg twice daily.  Follow up in 4 weeks with epilepsy " attending.  Patient receiving intensive PT OT as per physiatry.     Anticipated date of discharge.  Wednesday, 5/22/2024    The patient was discussed with Dr. Andrade and he is in agreement with the above note.

## 2024-05-16 NOTE — PLAN OF CARE
Problem: Prexisting or High Potential for Compromised Skin Integrity  Goal: Skin integrity is maintained or improved  Description: INTERVENTIONS:  - Identify patients at risk for skin breakdown  - Assess and monitor skin integrity  - Assess and monitor nutrition and hydration status  - Monitor labs   - Assess for incontinence   - Turn and reposition patient  - Assist with mobility/ambulation  - Relieve pressure over bony prominences  - Avoid friction and shearing  - Provide appropriate hygiene as needed including keeping skin clean and dry  - Evaluate need for skin moisturizer/barrier cream  - Collaborate with interdisciplinary team   - Patient/family teaching  - Consider wound care consult   Outcome: Progressing     Problem: PAIN - ADULT  Goal: Verbalizes/displays adequate comfort level or baseline comfort level  Description: Interventions:  - Encourage patient to monitor pain and request assistance  - Assess pain using appropriate pain scale  - Administer analgesics based on type and severity of pain and evaluate response  - Implement non-pharmacological measures as appropriate and evaluate response  - Consider cultural and social influences on pain and pain management  - Notify physician/advanced practitioner if interventions unsuccessful or patient reports new pain  Outcome: Progressing     Problem: INFECTION - ADULT  Goal: Absence or prevention of progression during hospitalization  Description: INTERVENTIONS:  - Assess and monitor for signs and symptoms of infection  - Monitor lab/diagnostic results  - Monitor all insertion sites, i.e. indwelling lines, tubes, and drains  - Monitor endotracheal if appropriate and nasal secretions for changes in amount and color  - [place] appropriate cooling/warming therapies per order  - Administer medications as ordered  - Instruct and encourage patient and family to use good hand hygiene technique  - Identify and instruct in appropriate isolation precautions for  identified infection/condition  Outcome: Progressing     Problem: SAFETY ADULT  Goal: Patient will remain free of falls  Description: INTERVENTIONS:  - Educate patient/family on patient safety including physical limitations  - Instruct patient to call for assistance with activity   - Consult OT/PT to assist with strengthening/mobility   - Keep Call bell within reach  - Keep bed low and locked with side rails adjusted as appropriate  - Keep care items and personal belongings within reach  - Initiate and maintain comfort rounds  - Make Fall Risk Sign visible to staff  - Apply yellow socks and bracelet for high fall risk patients  - Consider moving patient to room near nurses station  Outcome: Progressing  Goal: Maintain or return to baseline ADL function  Description: INTERVENTIONS:  -  Assess patient's ability to carry out ADLs; assess patient's baseline for ADL function and identify physical deficits which impact ability to perform ADLs (bathing, care of mouth/teeth, toileting, grooming, dressing, etc.)  - Assess/evaluate cause of self-care deficits   - Assess range of motion  - Assess patient's mobility; develop plan if impaired  - Assess patient's need for assistive devices and provide as appropriate  - Encourage maximum independence but intervene and supervise when necessary  - Involve family in performance of ADLs  - Assess for home care needs following discharge   - Consider OT consult to assist with ADL evaluation and planning for discharge  - Provide patient education as appropriate  Outcome: Progressing  Goal: Maintains/Returns to pre admission functional level  Description: INTERVENTIONS:  - Perform AM-PAC 6 Click Basic Mobility/ Daily Activity assessment daily.  - Set and communicate daily mobility goal to care team and patient/family/caregiver.   - Collaborate with rehabilitation services on mobility goals if consulted  - Perform Range of Motion 3 times a day.  - Reposition patient every 2 hours.  -  Dangle patient 3 times a day  - Stand patient 5 times a day  - Ambulate patient 3 times a day  - Out of bed to chair 3 times a day   - Out of bed for meals 3 times a day  - Out of bed for toileting  - Record patient progress and toleration of activity level   Outcome: Progressing     Problem: NEUROSENSORY - ADULT  Goal: Achieves stable or improved neurological status  Description: INTERVENTIONS  - Monitor and report changes in neurological status  - Monitor vital signs such as temperature, blood pressure, glucose, and any other labs ordered   - Initiate measures to prevent increased intracranial pressure  - Monitor for seizure activity and implement precautions if appropriate      Outcome: Progressing  Goal: Remains free of injury related to seizures activity  Description: INTERVENTIONS  - Maintain airway, patient safety  and administer oxygen as ordered  - Monitor patient for seizure activity, document and report duration and description of seizure to physician/advanced practitioner  - If seizure occurs,  ensure patient safety during seizure  - Reorient patient post seizure  - Seizure pads on all 4 side rails  - Instruct patient/family to notify RN of any seizure activity including if an aura is experienced  - Instruct patient/family to call for assistance with activity based on nursing assessment  - Administer anti-seizure medications if ordered    Outcome: Progressing

## 2024-05-16 NOTE — PROGRESS NOTES
Physical Medicine and Rehabilitation Progress Note  Paras Pitts 68 y.o. male MRN: [id number]  Unit/Bed#: -02 Encounter: [id number]      Assessment & Plan:     Decline in ADLs and mobility: Functional assessment - improving         FIM  Care Score  Admit Score Recent Score    Total assist  1-100% or 2p    Tot Bathing, LBD    Max assist 2-51-75%    Sub UBD LBD, To hygiene    Mod assist 3- 26-50%  Par     Min assist 3- 25% or < Par     CG assist 4  TA  UBD, bathing   Sup/Setup 4-5 Sup     Mod-I/Indep 6 MI      Transfers  Total  General-mod assist     Ambulation   Total  Min mod assist with a second person for chair follow so technically still total assist    WC mob  Mod assist 150 ft  Supervision     Stairs   Total assist/NT      Goal: CGA-supervision for most ADLs and for WC mobility except possible increased assist for LBD/bathing  Major barriers:  Cog impairment, imbalance, deconditioning, risk of complications  Dispo: Home with [facility] Fri 5/24 with  PT, OT, ST, RN and wife   - Recommend CG training      Brain mass  Assessment & Plan  5/16 neuroexam stable  Status post Avastin infusion on 5/15  - patient reports increased malaise and activity intolerance about  days after treatments typically that last 1 to 2 days - fall precautions, monitor closely    Patient with known metastatsis to right frontal lobe s/p right frontal craniotomy in March 2023  H-O recommending increasing dex to 8mg BID x2 days and then will resume chronic Dexamethasone 4mg qday   Has been on Dexamethasone taking 4mg qday per prior providers recently for vasogenic edema (appears to be on 2mg BID prior at home based on chart review)   Residual mild left hemiparesis but remained highly functional and independent.  Patient status post chemotherapy radiation SRT, immunotherapy  Follows with Dr. Langford    * Seizure disorder (HCC)  Assessment & Plan  No evidence of seizures recently    New onset seizures presenting for 2024  CT head showing right  frontal lobe vasogenic edema with known underlying lesion.    MRI brain showing a right superior frontal mass resection and chemoradiation with unchanged linear enhancement along the surgical cavity compared to 3/26/2024 favoring radiation necrosis.    Patient seen by neurology.    Video EEG showing: Early in recording there was frequent or nearly continuous focal motor seizure and later there were two subclinical right fronto central electrographic seizures.    Patient placed on the following AED regimen: Keppra 2 g twice daily, Vimpat 200 mg twice daily, Onfi 5 mg twice daily.  *Of note patient was significantly sedated with Ativan.  (Patient also on Mirapex 0.25mg TID presumable for tremors or RLS)  Continue seizure precautions     Patient to follow-up with neurology-epileptologist as an outpatient.    Cough  Assessment & Plan  Stable  Comgmt with IM team  Monitor lung exam, vitals with and without activity  Encourage incentive spirometry/flutter valve   Pt afebrile no leukocytosis; saturating well on RA   Covid/Flu/RSV PCR negative 5/13  CXR 5/14 - Right basilar linear opacity, likely atelectasis. Right upper lobectomy. 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  Dexamethasone taking 4mg qday per prior providers recently for vasogenic edema (appears to be on 2mg BID prior at home based on chart review)   Patient is under the care of Dr. Langford for medical oncology and is to begin Avastin, chemotherapy radiation therapy for his recurrent  mediastinal disease post rehabilitation.  Avastin Infusion 5/15/2024.   Infusion center to set up chemo treatment   CBC, CMP, UA on Monday 5/13   Essentially unremarkable  Recent increased cough - comgmt per IM > improving  Covid/flu/RSV negative 5/13  CXR 5/14 - Right basilar linear opacity, likely atelectasis. Right upper lobectomy.  Encourage IS, flutter      Cognitive impairment  Assessment & Plan  Cog stable 5/16   Did have fall 5/11 when he tried to reach down to  urinal - MD young without signs of injury and patient continues to deny injury from fall  - High fall precautions with frequent rounding - if increased safety concerns consider q15 checks or 1:1  - MOCA completed 5/9 - obtain results   - Increased risk of delirium - monitor   - Patient/family/CG teaching   - Optimal Sleep/wake cycle management  - Limit sedating/deliriogenic meds when possible  - Optimal medical, mood, and pain management   - Skilled therapies as outlined   - Compensatory strategies   - Optimize oversight after discharge  - OP follow-up with PCP and neuro      Left hemiparesis (HCC)  Assessment & Plan  Stable   Worsening left hemiparesis likely related to recent radiation necrosis and vasogenic edema, seizures  Decadron for hx of vasogenic edema  Continue acute rehabilitation program to maximize function      History of CVA (cerebrovascular accident)  Assessment & Plan  Patient with ischemic CVA in 2011  Resultant mild left hemiparesis    Bilateral edema of lower extremity  Assessment & Plan  Stable; if worsens or becomes painful obtain BLE venous doppler   1+ Pitting edema bilaterally likely secondary to increased dependent position.    On Lovenox for DVT prophylaxis   TEDS daily  Elevate limbs more    Adjustment disorder  Assessment & Plan  Mood acceptable   Supportive counseling  Consider neuropsychology consultation if needed     Pseudobulbar affect  Assessment & Plan  Mood is much improved without emotional lability noted  (week of 5/6/2024)  On admission at times was tearful and emotionally labile  May benefit from a future SSRI if continues      Sore throat  Assessment & Plan  Improved; Covid/RSV/flu negative 5/13   PRN Robitussin ordered    Constipation  Assessment & Plan  Stooling adequately   Miralax qday  Declines supp  If needed add senna      Hypercholesterolemia  Assessment & Plan  Continue Lipitor 40 mg daily (home dose)    Essential hypertension  Assessment & Plan  Continue Norvasc 10 mg daily and losartan 50 mg daily (home dose)  Monitor BP and heart rate during rest and with activity  Internal medicine managing    Overweight (BMI 25.0-29.9)  Assessment & Plan  Dietary and lifestyle changes when able        Other Medical Issues:  Monitor for     Follow-up providers and other issues to be followed up after discharge  PCP  Neuro  H-O  Other chronic providers    CODE: Level 1: Full Code per prior providers    Restrictions include:  Fall precautions    Objective:    Allergies per EMR  Diagnostic Studies: Reviewed, no new imaging  XR chest 1 view   Final Result by Jerad Castaneda MD (05/14 1010)      Right basilar linear opacity, likely atelectasis.      Right upper lobectomy.         Workstation performed: [id number]           See above as well    Laboratory: Labs reviewed  Results from last 7 days   Lab Units 05/13/24  0550   HEMOGLOBIN g/dL 12.0   HEMATOCRIT % 36.6   WBC Thousand/uL 7.98     Results from last 7 days   Lab Units 05/13/24  0550   BUN mg/dL 12   SODIUM mmol/L 141   POTASSIUM mmol/L 3.8   CHLORIDE mmol/L 103   CREATININE mg/dL 0.74   AST U/L 14   ALT U/L 21            Drug regimen reviewed, all potential adverse effects identified and addressed:    Scheduled Meds:  Current Facility-Administered Medications   Medication Dose Route Frequency Provider Last Rate    acetaminophen  975 mg Oral Q8H PRN Jenise Dawson PA-C      alteplase  2 mg Intracatheter Q1MIN PRN Collin Langford MD      aluminum-magnesium  hydroxide-simethicone  30 mL Oral Q6H PRN Jenise Dawson PA-C      amLODIPine  10 mg Oral Daily Jenise Dawson PA-C      atorvastatin  40 mg Oral After Dinner Jenise Dawson PA-C      bisacodyl  10 mg Rectal Daily PRN Omer Andrade MD      cloBAZam  5 mg Oral BID Jenise Dawson PA-C      [START ON 5/17/2024] dexamethasone  4 mg Oral Daily Layton Yoder MD      dexamethasone  8 mg Oral BID Omer Andrade MD      dextromethorphan-guaiFENesin  10 mL Oral Q4H PRN Corina Arita MD      docusate sodium  100 mg Oral BID PRN Corina Arita MD      enoxaparin  40 mg Subcutaneous Daily Corina Arita MD      guaiFENesin  600 mg Oral Q12H Counts include 234 beds at the [facility] Omer Andrade MD      lacosamide  200 mg Oral Q12H LEN Jenise Dawson PA-C      levETIRAcetam  2,000 mg Oral Q12H LEN Jenise Dawson PA-C      losartan  50 mg Oral Daily Jenise Dawson PA-C      ondansetron  4 mg Oral Q6H PRN Jenise Dawson PA-C      pantoprazole  40 mg Oral Early Morning Jenise Dawson PA-C      polyethylene glycol  17 g Oral Daily PRN Corina Arita MD      polyethylene glycol  17 g Oral Daily Layton Yoder MD      pramipexole  0.25 mg Oral TID Jenise Dawson PA-C         Chief Complaints:  Rehab follow-up     Subjective:     On eval, patient denies feeling more tired, lightheaded, worsening strength or sensation, fever, chills, worsening cough, shortness of breath, or other new complaints.    ROS: A 10 point ROS was performed; negative except as noted above.       Physical Exam:  Vitals:    05/15/24 2026   BP: 136/79   Pulse: 74   Resp: 18   Temp: (!) 97.3 °F (36.3 °C)   SpO2: 93%     GEN:  Sitting in NAD   HEENT/NECK: MMM  CARDIAC: Regular rate rhythm, no murmers, no rubs, no gallops  LUNGS:  clear to auscultation, no wheezes, rales, or rhonchi  ABDOMEN: Soft, non-tender, non-distended, normal active bowel sounds  EXTREMITIES/SKIN:  no calf  "edema, no calf tenderness to palpation  NEURO:   MENTAL STATUS: Adequate wakefulness, able to follow simple commands and Strength/MMT:  Unchanged  PSYCH:  Affect:  Euthymic     PMR CS HPI:  \"Paras Pitts is a 68 y.o. 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  CT head showing right frontal lobe vasogenic edema with known underlying lesion.  MRI brain showing a right superior frontal mass resection and chemoradiation with unchanged linear enhancement along the surgical cavity compared to 3/26/2024 favoring radiation necrosis.  Patient seen by neurology.  Video EEG showing: Early in recording there was frequent or nearly continuous focal motor seizure and later there were two subclinical right fronto central electrographic seizures.  Patient placed on the following AED regimen: Keppra 2 g twice daily, Vimpat 200 mg twice daily, Onfi 5 mg twice daily.  Of note patient was significantly sedated with Ativan.   Patient to follow-up with epileptologist as an outpatient.  Patient was seen by radiation oncology regarding edema and radiation necrosis and recommended to increase dexamethasone to 4 mg twice daily.  Patient is under the care of Dr. Langford for medical oncology and is to begin Avastin, chemotherapy radiation therapy for his recurrent mediastinal disease post rehabilitation.  After medical stabilization, patient found to " "have acute functional deficits from his mobility and self-care, therefore admitted to [facility] for acute inpatient rehabilitation.\"    ** Please Note: Fluency Direct voice to text software may have been used in the creation of this document. **    "

## 2024-05-16 NOTE — PROGRESS NOTES
05/16/24 1330   Pain Assessment   Pain Assessment Tool 0-10   Pain Score No Pain   Restrictions/Precautions   Precautions Bed/chair alarms;Cognitive;Fall Risk;Seizure;Supervision on toilet/commode   Cognition   Overall Cognitive Status Impaired   Arousal/Participation Alert;Responsive;Cooperative   Attention Attends with cues to redirect   Orientation Level Oriented X4   Memory Decreased short term memory;Decreased recall of precautions   Following Commands Follows one step commands without difficulty   Sit to Lying   Type of Assistance Needed Physical assistance;Verbal cues   Physical Assistance Level 26%-50%   Comment mod A bed rail hospital bed   Sit to Lying CARE Score 3   Lying to Sitting on Side of Bed   Type of Assistance Needed Physical assistance;Verbal cues   Physical Assistance Level 26%-50%   Comment mod A bed rail hospital bed   Lying to Sitting on Side of Bed CARE Score 3   Sit to Stand   Type of Assistance Needed Physical assistance;Verbal cues   Physical Assistance Level 26%-50%   Comment min-mod A pull to stand using bed rail; mod A STS using PRFW; assist with LUE placement   Sit to Stand CARE Score 3   Bed-Chair Transfer   Type of Assistance Needed Physical assistance;Verbal cues   Physical Assistance Level 26%-50%   Comment min-mod A sit pivot wheelchair<>bed   Chair/Bed-to-Chair Transfer CARE Score 3   Therapeutic Interventions   Balance transfer training   Assessment   Treatment Assessment Patient agreeable to therapy session.  Patient's wife in for family training.   Reviewed STS, SPT, and sit pivot transfers as well as bed mobility with pt's wife.  Discussed, that depending on fatigue, certain transfer may be easier to complete.  Recommend keeping wheelchair at end of bed when completing transfers to allow for patient to get as close to the head of the bed as possible.   Pt's wife would like to continue to practice mobility training with therapy as well as assisting nursing at night to get  more comfortable with moving him.   PT Barriers   Physical Impairment Decreased strength;Decreased range of motion;Decreased endurance;Impaired balance;Decreased mobility;Decreased coordination;Decreased safety awareness;Impaired tone   Functional Limitation Wheelchair management;Walking;Transfers;Standing;Stair negotiation;Ramp negotiation;Car transfers   Plan   Treatment/Interventions Functional transfer training;Bed mobility   Progress Progressing toward goals   PT Therapy Minutes   PT Time In 1330   PT Time Out 1400   PT Total Time (minutes) 30   PT Mode of treatment - Individual (minutes) 30   PT Mode of treatment - Concurrent (minutes) 0   PT Mode of treatment - Group (minutes) 0   PT Mode of treatment - Co-treat (minutes) 0   PT Mode of Treatment - Total time(minutes) 30 minutes   PT Cumulative Minutes 1230   Therapy Time missed   Time missed? No

## 2024-05-16 NOTE — PROGRESS NOTES
05/16/24 1100   Pain Assessment   Pain Assessment Tool 0-10   Pain Score No Pain   Restrictions/Precautions   Precautions Bed/chair alarms;Cognitive;Fall Risk;Contact/isolation;Multiple lines;Seizure;Supervision on toilet/commode  (port R chest wall; contact precautions from chemo for 24-48 hours after procedure completed on 5/15.)   Eating   Type of Assistance Needed Set-up / clean-up   Physical Assistance Level No physical assistance   Eating CARE Score 5   Health Management   Health Management Pt and OTS discussed home methods for medication management. Pt reports not having much of a routine in the past, but is aware that he will most likely have more medications when he leaves [facility] and was receptive to starting one. In the past, pt left meds on the counter and took them out of the medication bottles after breakfast. Pt completed simulation with AM/PM pill organizers and was able to follow directions and correctly place medications into organizers. Pt also practiced opening/closing medication bottles and performed well.   Exercise Tools   Theraputty Pt placed/removed popsicle sticks in/out of yellow putty to work on improving BUE strength. Completed bilaterally with more focus on LUE.   Neuromuscular Education   Functional Movement Patterns Saebo MAS tension 3.5 to complete 3x15 of sh flex/ext, sh protraction/retraction, and sh abd/add   Coordination   Fine Motor Pt able to manipulate simulated pills by taking them out of medication bottle and place them into pill organizer. Pt then removed them from pill organizer and placed them back into their respective containers.   Cognition   Overall Cognitive Status Impaired   Arousal/Participation Alert;Responsive;Cooperative   Attention Attends with cues to redirect   Orientation Level Oriented X4   Memory Decreased short term memory;Decreased recall of precautions   Following Commands Follows one step commands without difficulty   Additional Activities   Additional  Activities Other (Comment)   Additional Activities Comments fxl mobility to and from OT room in w/c with pt self propelling using RUE while LUE in protected positioning.   Activity Tolerance   Activity Tolerance Patient tolerated treatment well   Other Comments   Assessment Pt particiapted with 30 minutes concurrent tx with Pt with similar goals with focus of overall strengthening and activity tolerence for use with ADL routines.   Assessment   Treatment Assessment Pt agreeable to skilled OT session this AM, focusing on UB ROM/strengthening, FMC, and IADL training, details listed in respective sections. Pt encouraged to utilize LUE during today's session. Pt was receptive to this and tried to incorporate LUE into activities during session. He demonstrated increased endurance by taking less rest breaks when comapred to previous sessions. OTS and pt worked together to create a routine to manage medications for pt to implement once he returns home. Pt continues with barriers of decreased strength throughout but especially L side s/p previous CVA, decreased balance, impaired coordination L side, impaired safety awareness, problem solving, and attention, and decreased activity tolerance; all affect independence in self care and fxl transfers. Pt would benefit from continued skilled OT services in order to address listed barriers and prepare for safe d/c.   Prognosis Good   Problem List Decreased strength;Decreased range of motion;Decreased endurance;Impaired balance;Decreased coordination;Decreased cognition;Decreased safety awareness;Impaired judgement   Plan   Treatment/Interventions ADL retraining;Functional transfer training;Therapeutic exercise;Endurance training;Cognitive reorientation;Patient/family training;Equipment eval/education;Bed mobility;Compensatory technique education;OT   Progress Progressing toward goals   OT Therapy Minutes   OT Time In 1100   OT Time Out 1200   OT Total Time (minutes) 60   OT Mode of  treatment - Individual (minutes) 30   OT Mode of treatment - Concurrent (minutes) 30   Therapy Time missed   Time missed? No

## 2024-05-16 NOTE — PROGRESS NOTES
"   05/16/24 1400   Pain Assessment   Pain Assessment Tool 0-10   Pain Score No Pain   Restrictions/Precautions   Precautions Bed/chair alarms;Cognitive;Fall Risk;Contact/isolation;Multiple lines;Supervision on toilet/commode;Seizure  (port R chest wall; contact precautions from chemo for 24-48 hours after procedure completed on 5/15.)   Sit to Stand   Type of Assistance Needed Physical assistance;Verbal cues   Physical Assistance Level 25% or less   Comment pt pulled himself into standing from w/c using grab bar in bathroom with Alek from OTS to maintain balance and verbal cues for L hand/foot placement.   Sit to Stand CARE Score 3   Bed-Chair Transfer   Type of Assistance Needed Physical assistance   Physical Assistance Level 51%-75%   Comment modA to move LLE into proper positioning during transfer from w/c to recliner using PFRW.   Chair/Bed-to-Chair Transfer CARE Score 2   Transfer Bed/Chair/Wheelchair   Positioning Concerns   (L Hemiparesis)   Limitations Noted In Balance;Endurance;UE Strength;LE Strength   Adaptive Equipment   (PFRW)   Toilet Transfer   Type of Assistance Needed Physical assistance;Verbal cues   Physical Assistance Level 26%-50%   Comment min-modA to maintain balance from w/c to raised toilet with grab bar. verbal cues for L hand/foot placement   Toilet Transfer CARE Score 3   Toilet Transfer   Surface Assessed Raised Toilet   Transfer Technique Stand Pivot   Limitations Noted In Balance;Endurance;ROM;Safety;UE Strength;LE Strength   Adaptive Equipment Grab Bar   Activity Tolerance   Activity Tolerance Patient tolerated treatment well   Assessment   Treatment Assessment Pt participated in OT session consisting of transfers to and from commode, toilet, and recliner, completed with demonstration for Bossman. Sit-pivot transfer to and from drop arm commode attempted, however is not the best and most safe method for the pt. Toilet height measured to be 18\", Marycolette is to measure toilet, " purchase riser, and install grab bars in bathroom. Pt continues with barriers of decreased strength throughout but especially L side s/p previous CVA, decreased balance, impaired coordination L side, impaired safety awareness, problem solving, and attention, and decreased activity tolerance; all affect independence in self care and fxl transfers. Pt would benefit from continued skilled OT services in order to address listed barriers and prepare for safe d/c.   OT Family training done with: Bossman   Assessment of family training Bossman was present observing transfers. She preferred to observe and see the transfers demonstrated before attempting. Bossman states she can attempt transfers with staff at night and over the weekend. Home modifications discussed,   Prognosis Good   Problem List Decreased strength;Decreased range of motion;Decreased endurance;Impaired balance;Decreased cognition;Decreased safety awareness;Impaired judgement;Decreased coordination   Plan   Treatment/Interventions ADL retraining;Functional transfer training;Therapeutic exercise;Endurance training;Equipment eval/education;Patient/family training;Cognitive reorientation;Bed mobility;Compensatory technique education;OT   Progress Progressing toward goals   OT Therapy Minutes   OT Time In 1400   OT Time Out 1430   OT Total Time (minutes) 30   OT Mode of treatment - Individual (minutes) 30   Therapy Time missed   Time missed? No

## 2024-05-16 NOTE — PROGRESS NOTES
05/16/24 0858   Pain Assessment   Pain Assessment Tool 0-10   Pain Score No Pain   Restrictions/Precautions   Precautions Bed/chair alarms;Cognitive;Fall Risk;Seizure;Supervision on toilet/commode  (port in R chest for chemo)   Braces or Orthoses   (DF assist wrap)   Cognition   Overall Cognitive Status Impaired   Arousal/Participation Alert;Responsive;Cooperative   Attention Attends with cues to redirect   Orientation Level Oriented X4   Memory Decreased short term memory;Decreased recall of precautions   Following Commands Follows one step commands without difficulty   Sit to Lying   Type of Assistance Needed Physical assistance;Verbal cues   Physical Assistance Level 26%-50%   Comment min-occ mod A; assist with LLE   Sit to Lying CARE Score 3   Lying to Sitting on Side of Bed   Type of Assistance Needed Physical assistance;Verbal cues   Physical Assistance Level 26%-50%   Comment min-mod A; assist with LLE and trunk   Lying to Sitting on Side of Bed CARE Score 3   Sit to Stand   Type of Assistance Needed Physical assistance;Verbal cues   Physical Assistance Level 26%-50%   Comment min-mod A STS with PFRW; assist for L arm placement on platform   Sit to Stand CARE Score 3   Bed-Chair Transfer   Type of Assistance Needed Physical assistance;Verbal cues   Physical Assistance Level 26%-50%   Comment min-mod A sit pivot transfers wheelchair<>mat table   Chair/Bed-to-Chair Transfer CARE Score 3   Walk 10 Feet   Type of Assistance Needed Physical assistance;Verbal cues   Physical Assistance Level Total assistance   Comment min-mod A level surfaces with PFRW; second person for wheelchair follow; improved device management, weight shift, and LLE advancement noted; increased time to complete   Walk 10 Feet CARE Score 1   Walk 50 Feet with Two Turns   Type of Assistance Needed Physical assistance;Verbal cues   Physical Assistance Level Total assistance   Comment min-mod A level surfaces with PFRW; second person for  wheelchair follow; improved device management, weight shift, and LLE advancement noted; increased time to complete   Walk 50 Feet with Two Turns CARE Score 1   Ambulation   Primary Mode of Locomotion Prior to Admission Walk   Distance Walked (feet) 103 ft  (89' 103' 80')   Assist Device Platform;Roller Walker   Gait Pattern Inconsistant Chioma;Slow Chioma;Decreased foot clearance;L foot drag;Forward Flexion;L hemiparesis;Narrow ABHINAV;Step to;Decreased L stance;Improper weight shift   Limitations Noted In Balance;Device Management;Endurance;Heel Strike;Posture;Safety;Speed;Strength   Provided Assistance with: Balance;Trunk Support;Weight Shift   Walk Assist Level Minimum Assist;Moderate Assist   Findings min-mod A level surfaces with PFRW; second person for wheelchair follow; improved device management, weight shift, and LLE advancement noted; increased time to complete   Does the patient walk? 2. Yes   Therapeutic Interventions   Strengthening suipne ther ex; AROM RLE; AAROM LLE   Balance gait and transfer training   Assessment   Treatment Assessment Patient agreeable to therapy session.   No pain reported.  Increased fatigue noted t/o session.   Continues to require increased time to completed all tasks.  Cues needed for general safety.   Improvements noted in LLE AROM, transfer training, and gait training today as demonstrated by less assistance to complete ther ex, decreased assistance for transfers, and improved weight shift, LLE advancement, and improved device management for gait training.   Completed ther ex for general LE strengthening as well as improved LLE control; gait and transfer training focusing on sequence and technique for improved balance and safety with functional mobility using walker.  Patient appropriate for concurrent therapy to increase motivation and encouragement among pts with similar deficits while completing therapy session.   PT Barriers   Physical Impairment Decreased  strength;Decreased range of motion;Decreased endurance;Impaired balance;Decreased mobility;Decreased coordination;Decreased safety awareness;Impaired tone   Functional Limitation Wheelchair management;Walking;Transfers;Standing;Stair negotiation;Ramp negotiation;Car transfers   Plan   Treatment/Interventions Functional transfer training;LE strengthening/ROM;Therapeutic exercise;Bed mobility;Gait training   Progress Progressing toward goals   PT Therapy Minutes   PT Time In 0858   PT Time Out 1030   PT Total Time (minutes) 92   PT Mode of treatment - Individual (minutes) 62   PT Mode of treatment - Concurrent (minutes) 30   PT Mode of treatment - Group (minutes) 0   PT Mode of treatment - Co-treat (minutes) 0   PT Mode of Treatment - Total time(minutes) 92 minutes   PT Cumulative Minutes 1200   Therapy Time missed   Time missed? No

## 2024-05-17 PROCEDURE — 97530 THERAPEUTIC ACTIVITIES: CPT

## 2024-05-17 PROCEDURE — 97535 SELF CARE MNGMENT TRAINING: CPT

## 2024-05-17 PROCEDURE — 99232 SBSQ HOSP IP/OBS MODERATE 35: CPT

## 2024-05-17 PROCEDURE — 97110 THERAPEUTIC EXERCISES: CPT

## 2024-05-17 PROCEDURE — 97116 GAIT TRAINING THERAPY: CPT

## 2024-05-17 PROCEDURE — 97542 WHEELCHAIR MNGMENT TRAINING: CPT

## 2024-05-17 PROCEDURE — 92507 TX SP LANG VOICE COMM INDIV: CPT | Performed by: NURSE PRACTITIONER

## 2024-05-17 RX ADMIN — LACOSAMIDE 200 MG: 150 TABLET ORAL at 21:06

## 2024-05-17 RX ADMIN — LACOSAMIDE 200 MG: 150 TABLET ORAL at 09:05

## 2024-05-17 RX ADMIN — LEVETIRACETAM 2000 MG: 500 TABLET, FILM COATED ORAL at 09:05

## 2024-05-17 RX ADMIN — PRAMIPEXOLE DIHYDROCHLORIDE 0.25 MG: 0.12 TABLET ORAL at 09:05

## 2024-05-17 RX ADMIN — CLOBAZAM 5 MG: 10 TABLET ORAL at 17:12

## 2024-05-17 RX ADMIN — CLOBAZAM 5 MG: 10 TABLET ORAL at 09:04

## 2024-05-17 RX ADMIN — PRAMIPEXOLE DIHYDROCHLORIDE 0.25 MG: 0.12 TABLET ORAL at 21:06

## 2024-05-17 RX ADMIN — DEXAMETHASONE 4 MG: 4 TABLET ORAL at 09:05

## 2024-05-17 RX ADMIN — LOSARTAN POTASSIUM 50 MG: 50 TABLET, FILM COATED ORAL at 09:05

## 2024-05-17 RX ADMIN — GUAIFENESIN 600 MG: 600 TABLET ORAL at 09:05

## 2024-05-17 RX ADMIN — GUAIFENESIN 600 MG: 600 TABLET ORAL at 21:06

## 2024-05-17 RX ADMIN — PANTOPRAZOLE SODIUM 40 MG: 40 TABLET, DELAYED RELEASE ORAL at 05:12

## 2024-05-17 RX ADMIN — LEVETIRACETAM 2000 MG: 500 TABLET, FILM COATED ORAL at 21:07

## 2024-05-17 RX ADMIN — POLYETHYLENE GLYCOL 3350 17 G: 17 POWDER, FOR SOLUTION ORAL at 09:08

## 2024-05-17 RX ADMIN — AMLODIPINE BESYLATE 10 MG: 10 TABLET ORAL at 09:05

## 2024-05-17 RX ADMIN — PRAMIPEXOLE DIHYDROCHLORIDE 0.25 MG: 0.12 TABLET ORAL at 17:00

## 2024-05-17 RX ADMIN — ATORVASTATIN CALCIUM 40 MG: 40 TABLET, FILM COATED ORAL at 17:12

## 2024-05-17 RX ADMIN — ENOXAPARIN SODIUM 40 MG: 40 INJECTION SUBCUTANEOUS at 09:04

## 2024-05-17 NOTE — PROGRESS NOTES
05/17/24 0700   Pain Assessment   Pain Assessment Tool 0-10   Pain Score No Pain   Restrictions/Precautions   Precautions Bed/chair alarms;Cognitive;Fall Risk;Contact/isolation;Multiple lines;Seizure;Supervision on toilet/commode  (port R chest wall for chemo; contact precautions for chemo)   Weight Bearing Restrictions No   ROM Restrictions No   Eating   Type of Assistance Needed Set-up / clean-up   Physical Assistance Level No physical assistance   Eating CARE Score 5   Eating Assessment   Food To Mouth Yes   Able To Cut Yes   Positioning Upright;Out of Bed   Meal Assessed Breakfast   Opens Packages No   Oral Hygiene   Type of Assistance Needed Set-up / clean-up   Comment OTS pushed w/c to sink   Oral Hygiene CARE Score 5   Grooming   Able To Acquire Items;Comb/Brush Hair;Wash/Dry Face;Brush/Clean Teeth;Wash/Dry Hands   Limitation Noted In Coordination;Problem Solving;Safety;Strength   Shower/Bathe Self   Type of Assistance Needed Physical assistance   Physical Assistance Level 25% or less   Shower/Bathe Self CARE Score 3   Bathing   Assessed Bath Style Sponge Bath   Anticipated D/C Bath Style Shower;Sponge Bath   Able to Gather/Transport No   Able to Wash/Rinse/Dry (body part) Left Arm;Right Arm;L Upper Leg;R Upper Leg;L Lower Leg/Foot;R Lower Leg/Foot;Chest;Abdomen;Perineal Area   Limitations Noted in Balance;Coordination;Endurance;Problem Solving;ROM;Safety;Strength   Positioning Seated;Standing   Findings  pt able to wash/dry R buttock, however required Alek to wash/dry L buttock   Tub/Shower Transfer   Reason Not Assessed Sponge Bath   Upper Body Dressing   Type of Assistance Needed Physical assistance   Physical Assistance Level 25% or less   Comment Pt required assist to pull shirt down at lower back while donning shirt   Upper Body Dressing CARE Score 3   Lower Body Dressing   Type of Assistance Needed Physical assistance;Adaptive equipment;Verbal cues   Physical Assistance Level 25% or less   Comment  pt introduced to reacher to aid in stretching undergarment to don. Pt able to thread both legs, however required assist to pull up on L side while standing. Verbal cues for technique with reacher.   Lower Body Dressing CARE Score 3   Putting On/Taking Off Footwear   Type of Assistance Needed Physical assistance   Physical Assistance Level 25% or less   Comment pt required assist to don TEDs   Putting On/Taking Off Footwear CARE Score 3   Picking Up Object   Reason if not Attempted Safety concerns   Picking Up Object CARE Score 88   Dressing/Undressing Clothing   Remove UB Clothes Pullover Shirt   Don UB Clothes Pullover Shirt   Remove LB Clothes Undergarment   Don LB Clothes Shorts;Undergarment;TEDs;Socks   Limitations Noted In Balance;Coordination;Endurance;Problem Solving;Safety;Strength;ROM   Adaptive Equipment Reacher   Positioning Supported Sit;Standing   Roll Left and Right   Type of Assistance Needed Supervision   Physical Assistance Level No physical assistance   Roll Left and Right CARE Score 4   Lying to Sitting on Side of Bed   Type of Assistance Needed Physical assistance   Physical Assistance Level 26%-50%   Comment pt able to maneuver feet out of bed to ground, however required min-modA to lift trunk from sidelying to sitting EOB.   Lying to Sitting on Side of Bed CARE Score 3   Sit to Stand   Type of Assistance Needed Physical assistance   Physical Assistance Level 25% or less   Comment Alek for boost from w/c to stand; pt able to stand unsupported while completing bathing of perineal area and R buttock.   Sit to Stand CARE Score 3   Bed-Chair Transfer   Type of Assistance Needed Physical assistance   Physical Assistance Level 26%-50%   Comment pt demonstrated proper hand placement for bed to w/c transfer; min-modA   Chair/Bed-to-Chair Transfer CARE Score 3   Transfer Bed/Chair/Wheelchair   Positioning Concerns   (L Hemiparesis)   Limitations Noted In Balance;Endurance;Coordination;Problem Solving;UE  Strength;LE Strength   Exercise Tools   Other Exercise Tool 1 pt untied knots in black rubber tube   Cognition   Overall Cognitive Status Impaired   Arousal/Participation Alert;Responsive;Cooperative   Attention Attends with cues to redirect   Orientation Level Oriented X4   Memory Decreased short term memory;Decreased recall of precautions   Following Commands Follows one step commands with increased time or repetition   Activity Tolerance   Activity Tolerance Patient tolerated treatment well   Assessment   Treatment Assessment Pt agreeable to skilled OT session this AM, focusing on ADL training, fxl transfers, and adaptive equipment management, details listed in respective sections. Pt demonstrated increased balance during today's session by standing unsupported during bathing at sink. Pt demonstrates good carryover of awareness of LUE/LLE with protective placement and widening ABHINAV before standing unprompted. Pt introduced to reacher as dressing equipment today; pt was receptive to technique and demonstrated good carryover. Pt continues with barriers of decreased strength throughout but especially L side s/p previous CVA, decreased balance, impaired coordination L side, impaired safety awareness, problem solving, and attention, and decreased activity tolerance; all affect independence in self care and fxl transfers. Pt would benefit from continued skilled OT services in order to address listed barriers and prepare for safe d/c.   Prognosis Good   Problem List Decreased strength;Decreased range of motion;Decreased endurance;Impaired balance;Decreased coordination;Decreased cognition;Impaired judgement;Decreased safety awareness   Plan   Treatment/Interventions ADL retraining;Functional transfer training;Therapeutic exercise;Endurance training;Cognitive reorientation;Patient/family training;Equipment eval/education;Bed mobility;Compensatory technique education;OT   Progress Progressing toward goals   OT Therapy  Minutes   OT Time In 0700   OT Time Out 0830   OT Total Time (minutes) 90   OT Mode of treatment - Individual (minutes) 90   Therapy Time missed   Time missed? No

## 2024-05-17 NOTE — PROGRESS NOTES
05/16/24 1230   Pain Assessment   Pain Assessment Tool 0-10   Pain Score No Pain   Language Assessments   Informal Speech Language Assessment Test of Adolescent/Adult Word Finding 2 (TAWF 2) administered     The The of Adolescent/Adult Word Finding-2 (TAWF-2) is desinged a standardized assessment to help assess word finding problems in adolescents and adults ( ages 12-80). It consists of 5 naming sections: picture naming nouns, sentence completion naming, description naming, picture naming verbs and naming to categories. Scoring can be completed in a Complete Test version or in a Brief test option.     Complete test scoring    Picture naming:nouns raw score 21   Sentence completion naming  12   Picture naming : verbs 18   Picture naming: word groups 21   Total raw score 72   Word finding index 107   Percentile rank 68%   Descriptive term  Average      Speech/Language/Cognition Assessmetn   Treatment Assessment family training completed with wife and patient together.   SLP Therapy Minutes   SLP Time In 1230   SLP Time Out 1330   SLP Total Time (minutes) 60   SLP Mode of treatment - Individual (minutes) 60   SLP Mode of treatment - Concurrent (minutes) 0   SLP Mode of treatment - Group (minutes) 0   SLP Mode of treatment - Co-treat (minutes) 0   SLP Mode of Treatment - Total time(minutes) 60 minutes   SLP Cumulative Minutes 645

## 2024-05-17 NOTE — PROGRESS NOTES
"Progress Note - Paras Pitts 68 y.o. male MRN: [id number]    Unit/Bed#: [facility] 213-02 Encounter: [id number]        Subjective:   Patient seen and examined at bedside after reviewing the chart and discussing the case with the caring staff.      Patient examined at bedside.  Patient feeling tired today which he reports is usually how he feels after chemotherapy.  He otherwise has no complaints.     Physical Exam   Vitals: Blood pressure 157/93, pulse 88, temperature 98.1 °F (36.7 °C), temperature source Temporal, resp. rate 18, height 5' 9.02\" (1.753 m), weight 95.3 kg (210 lb 1.6 oz), SpO2 97%.,Body mass index is 31.01 kg/m².  Constitutional: Patient in no acute distress.  HEENT: PERR, EOMI, MMM.  Cardiovascular: Normal rate and regular rhythm.    Pulmonary/Chest: Normal effort and breath sounds normal.  Abdomen: Soft, + BS, NT.    Assessment/Plan:  Paras Pitts is a(n) 68 y.o. male with tremors of the nervous system.     Cardiac Hx w/ HTN, HLD, hx of CVA.  Continue amlodipine 10 mg daily, Losartan 50 mg daily, atorvastatin 40 mg daily.  Type 2 diabetes mellitus.  Hgb A1c 6.7% on 5/3/24.  Carb controlled diet.   GERD.  Continue Protonix 40 mg daily.  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  Stage [facility].  S/p robotic converted to right thoracotomy and right upper lobectomy on 9/1/2023. S/p bronchoscopy with [facility] at [facility] on 4/16/2024.  Continue Decadron 4 mg daily.  Patient completed [facility] at [facility] 4/29.  Received Avastin infusion 5/15.  Pain and bowel regimen. As per physiatry.  Cough/congestion.  Started Mucinex twice daily along with Robitussin as needed.  COVID/RSV/flu test 5/13 found to be negative.  CXR was negative for pneumonia.  Tremors of the nervous system.  Continue Keppra 2000 mg twice daily, Vimpat 200 mg twice daily, clobazam 5 mg twice daily.  Follow up in 4 weeks with epilepsy attending.  Patient receiving intensive PT OT as per physiatry.     Anticipated date of discharge.  " Wednesday, 5/22/2024    The patient was discussed with Dr. Andrade and he is in agreement with the above note.

## 2024-05-17 NOTE — PLAN OF CARE
Problem: MOBILITY - ADULT  Goal: Maintain or return to baseline ADL function  Description: INTERVENTIONS:  -  Assess patient's ability to carry out ADLs; assess patient's baseline for ADL function and identify physical deficits which impact ability to perform ADLs (bathing, care of mouth/teeth, toileting, grooming, dressing, etc.)  - Assess/evaluate cause of self-care deficits   - Assess range of motion  - Assess patient's mobility; develop plan if impaired  - Assess patient's need for assistive devices and provide as appropriate  - Encourage maximum independence but intervene and supervise when necessary  - Involve family in performance of ADLs  - Assess for home care needs following discharge   - Consider OT consult to assist with ADL evaluation and planning for discharge  - Provide patient education as appropriate  Outcome: Progressing

## 2024-05-17 NOTE — PROGRESS NOTES
05/17/24 1230   Pain Assessment   Pain Assessment Tool 0-10   Pain Score No Pain   Restrictions/Precautions   Precautions Bed/chair alarms;Cognitive;Fall Risk;Contact/isolation;Multiple lines;Seizure;Supervision on toilet/commode   Cognition   Overall Cognitive Status WFL   Orientation Level Oriented X4   Bed-Chair Transfer   Type of Assistance Needed Physical assistance;Verbal cues   Physical Assistance Level 26%-50%   Comment mod A sit pivot wheelchair>recliner   Chair/Bed-to-Chair Transfer CARE Score 3   Therapeutic Interventions   Strengthening supine ther ex; AROM RLE; AAROM LLE   Balance tranfer training   Assessment   Treatment Assessment Patient agreeable to therapy session.  Remains pain free.  Increased assistance required to complete transfers training due increased fatigue/decreased endurance.  Completed ther ex for general LE strengthening; transfer training focusing on sequence and technique for improved balance and safety with functional transfer.   PT Barriers   Physical Impairment Decreased strength;Decreased range of motion;Decreased endurance;Impaired balance;Decreased mobility;Decreased coordination;Decreased safety awareness;Impaired tone   Functional Limitation Wheelchair management;Walking;Transfers;Standing;Stair negotiation;Ramp negotiation;Car transfers   Plan   Treatment/Interventions Functional transfer training;LE strengthening/ROM;Therapeutic exercise   Progress Progressing toward goals   PT Therapy Minutes   PT Time In 1230   PT Time Out 1300   PT Total Time (minutes) 30   PT Mode of treatment - Individual (minutes) 30   PT Mode of treatment - Concurrent (minutes) 0   PT Mode of treatment - Group (minutes) 0   PT Mode of treatment - Co-treat (minutes) 0   PT Mode of Treatment - Total time(minutes) 30 minutes   PT Cumulative Minutes 1353   Therapy Time missed   Time missed? No

## 2024-05-17 NOTE — NURSING NOTE
"Care taken over from FAYE Giron at 10:00 AM. Patient continues with left side weakness. Transfers stand pivot into wheelchair or recliner. Needs cueing and direction for tasks. Reports no pain. States he feels \"sick\" today and more tired from his chemo treatment. Able to participate in therapy. Ate all meals. Remains continent. Resting in recliner at this time. Will continue to monitor and follow plan of care.   "

## 2024-05-17 NOTE — PROGRESS NOTES
05/17/24 1030   Pain Assessment   Pain Assessment Tool 0-10   Pain Score No Pain   Speech/Language/Cognition Assessmetn   Treatment Assessment Pt reports feeling more fatigue this date mentally and physically which he reports is a typical pattern following infusion treatments. He completed rush hour level 3 and 5 set up dasia and solved dasia ranging 2:00-7:00. Seek out words x10 trials up to 3-5 letter words @100% dasia.   SLP Therapy Minutes   SLP Time In 1030   SLP Time Out 1130   SLP Total Time (minutes) 60   SLP Mode of treatment - Individual (minutes) 60   SLP Mode of treatment - Concurrent (minutes) 0   SLP Mode of treatment - Group (minutes) 0   SLP Mode of treatment - Co-treat (minutes) 0   SLP Mode of Treatment - Total time(minutes) 60 minutes   SLP Cumulative Minutes 645

## 2024-05-17 NOTE — PROGRESS NOTES
Physical Medicine and Rehabilitation Progress Note  Paras Pitts 68 y.o. male MRN: [id number]  Unit/Bed#: -02 Encounter: [id number]      Assessment & Plan:     Decline in ADLs and mobility: Functional assessment - improving         FIM  Care Score  Admit Score Recent Score    Total assist  1-100% or 2p    Tot Bathing, LBD    Max assist 2-51-75%    Sub UBD    Mod assist 3- 26-50%  Par     Min assist 3- 25% or < Par  Bathing, To hygiene, dressing    CG assist 4  TA  UBD, bathing   Sup/Setup 4-5 Sup     Mod-I/Indep 6 MI      Transfers  Total  Mod assist     Ambulation   Total  mod assist with a second person for chair follow so technically still total assist    WC mob  Mod assist 150 ft  Supervision     Stairs   Total assist/NT      Goal: CGA-supervision for most ADLs and for WC mobility except possible increased assist for LBD/bathing  Major barriers:  Cog impairment, imbalance, deconditioning, risk of complications  Dispo: Home with [facility] Fri 5/24 with  PT, OT, ST, RN and wife   - Recommend CG training      Brain mass  Assessment & Plan  5/17 neuroexam stable  Function improving some    Status post Avastin infusion on 5/15  - patient reports increased malaise and activity intolerance about  days after treatments typically that last 1 to 2 days - fall precautions, monitor closely    Patient with known metastatsis to right frontal lobe s/p right frontal craniotomy in March 2023  H-O recommended increasing dex to 8mg BID x2 days and then will resume chronic Dexamethasone 4mg qday per Elaine Kay H-0  Has been on Dexamethasone taking 4mg qday per prior providers recently for vasogenic edema (appears to be on 2mg BID prior at home based on chart review)   Residual mild left hemiparesis but remained highly functional and independent.  Patient status post chemotherapy radiation SRT, immunotherapy  Follows with Dr. Langford    * Seizure disorder (HCC)  Assessment & Plan  No evidence of seizures recently    New onset  seizures presenting for 2024  CT head showing right frontal lobe vasogenic edema with known underlying lesion.    MRI brain showing a right superior frontal mass resection and chemoradiation with unchanged linear enhancement along the surgical cavity compared to 3/26/2024 favoring radiation necrosis.    Patient seen by neurology.    Video EEG showing: Early in recording there was frequent or nearly continuous focal motor seizure and later there were two subclinical right fronto central electrographic seizures.    Patient placed on the following AED regimen: Keppra 2 g twice daily, Vimpat 200 mg twice daily, Onfi 5 mg twice daily.  *Of note patient was significantly sedated with Ativan.  (Patient also on Mirapex 0.25mg TID presumable for tremors or RLS)  Continue seizure precautions     Patient to follow-up with neurology-epileptologist as an outpatient.    Cough  Assessment & Plan  Stable  Comgmt with IM team  Monitor lung exam, vitals with and without activity  Encourage incentive spirometry/flutter valve   Pt afebrile no leukocytosis; saturating well on RA   Covid/Flu/RSV PCR negative 5/13  CXR 5/14 - Right basilar linear opacity, likely atelectasis. Right upper lobectomy. 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  Dexamethasone taking 4mg qday per prior providers recently for vasogenic edema (appears to be on 2mg BID prior at home based on chart review)   Patient is under the care of Dr. Langford for medical oncology and is to begin Avastin,  chemotherapy radiation therapy for his recurrent mediastinal disease post rehabilitation.  Avastin Infusion 5/15/2024.   Infusion center to set up chemo treatment   CBC, CMP, UA on Monday 5/13   Essentially unremarkable  Recent increased cough - comgmt per IM > improving  Covid/flu/RSV negative 5/13  CXR 5/14 - Right basilar linear opacity, likely atelectasis. Right upper lobectomy.  Encourage IS, flutter      Cognitive impairment  Assessment & Plan  Cog stable 5/16   Did have fall 5/11 when he tried to reach down to  urinal - MD young without signs of injury and patient continues to deny injury from fall  - High fall precautions with frequent rounding - if increased safety concerns consider q15 checks or 1:1  - MOCA completed 5/9 - obtain results   - Increased risk of delirium - monitor   - Patient/family/CG teaching   - Optimal Sleep/wake cycle management  - Limit sedating/deliriogenic meds when possible  - Optimal medical, mood, and pain management   - Skilled therapies as outlined   - Compensatory strategies   - Optimize oversight after discharge  - OP follow-up with PCP and neuro      Left hemiparesis (HCC)  Assessment & Plan  Stable   Worsening left hemiparesis likely related to recent radiation necrosis and vasogenic edema, seizures  Decadron for hx of vasogenic edema  Continue acute rehabilitation program to maximize function      History of CVA (cerebrovascular accident)  Assessment & Plan  Patient with ischemic CVA in 2011  Resultant mild left hemiparesis    Bilateral edema of lower extremity  Assessment & Plan  Stable; if worsens or becomes painful obtain BLE venous doppler   1+ Pitting edema bilaterally likely secondary to increased dependent position.    On Lovenox for DVT prophylaxis   TEDS daily  Elevate limbs more    Adjustment disorder  Assessment & Plan  Mood acceptable   Supportive counseling  Consider neuropsychology consultation if needed     Pseudobulbar affect  Assessment & Plan  Mood  is much improved without emotional lability noted (week of 5/6/2024)  On admission at times was tearful and emotionally labile  May benefit from a future SSRI if continues      Sore throat  Assessment & Plan  Improved; Covid/RSV/flu negative 5/13   PRN Robitussin ordered    Constipation  Assessment & Plan  Stooling adequately   Miralax qday  Declines supp  If needed add senna      Hypercholesterolemia  Assessment & Plan  Continue Lipitor 40 mg daily (home dose)    Essential hypertension  Assessment & Plan  Continue Norvasc 10 mg daily and losartan 50 mg daily (home dose)  Monitor BP and heart rate during rest and with activity  Internal medicine managing    Overweight (BMI 25.0-29.9)  Assessment & Plan  Dietary and lifestyle changes when able        Other Medical Issues:  Monitor for     Follow-up providers and other issues to be followed up after discharge  PCP  Neuro  H-O  Other chronic providers    CODE: Level 1: Full Code per prior providers    Restrictions include:  Fall precautions    Objective:    Allergies per EMR  Diagnostic Studies: Reviewed, no new imaging  XR chest 1 view   Final Result by Jerad Castaneda MD (05/14 1010)      Right basilar linear opacity, likely atelectasis.      Right upper lobectomy.         Workstation performed: [id number]           See above as well    Laboratory: Labs reviewed  Results from last 7 days   Lab Units 05/13/24  0550   HEMOGLOBIN g/dL 12.0   HEMATOCRIT % 36.6   WBC Thousand/uL 7.98     Results from last 7 days   Lab Units 05/13/24  0550   BUN mg/dL 12   SODIUM mmol/L 141   POTASSIUM mmol/L 3.8   CHLORIDE mmol/L 103   CREATININE mg/dL 0.74   AST U/L 14   ALT U/L 21            Drug regimen reviewed, all potential adverse effects identified and addressed:    Scheduled Meds:  Current Facility-Administered Medications   Medication Dose Route Frequency Provider Last Rate    acetaminophen  975 mg Oral Q8H PRN Jenise Dawson PA-C      alteplase  2 mg Intracatheter  Q1MIN PRN Collin Langford MD      aluminum-magnesium hydroxide-simethicone  30 mL Oral Q6H PRN Jenise Dawson PA-C      amLODIPine  10 mg Oral Daily Jenise Dawson PA-C      atorvastatin  40 mg Oral After Dinner Jenise Dawson PA-C      bisacodyl  10 mg Rectal Daily PRN Omer Andrade MD      cloBAZam  5 mg Oral BID Jenise Dawson PA-C      dexamethasone  4 mg Oral Daily Layton Yoder MD      dextromethorphan-guaiFENesin  10 mL Oral Q4H PRN Corina Arita MD      docusate sodium  100 mg Oral BID PRN Corina Arita MD      enoxaparin  40 mg Subcutaneous Daily Corina Arita MD      guaiFENesin  600 mg Oral Q12H [facility] Omer Andrade MD      lacosamide  200 mg Oral Q12H LEN Jenise Dawson PA-C      levETIRAcetam  2,000 mg Oral Q12H LEN Jenise Dawson PA-C      losartan  50 mg Oral Daily Jenise Dawson PA-C      ondansetron  4 mg Oral Q6H PRN Jenise Dawson PA-C      pantoprazole  40 mg Oral Early Morning Jenise Dawson PA-C      polyethylene glycol  17 g Oral Daily PRN Corina Arita MD      polyethylene glycol  17 g Oral Daily Layton Yoder MD      pramipexole  0.25 mg Oral TID Jenise Dawson PA-C         Chief Complaints:  Rehab follow-up     Subjective:     On eval, patient reports increased fatigue today which she states is common but should feel better by tomorrow at some point.  He denies significant cough, fever, chills, worsening strength or sensation.  He did have some brief lightheadedness with therapy but not significant.    ROS: A 10 point ROS was performed; negative except as noted above.       Physical Exam:  Vitals:    05/17/24 0909   BP: 157/93   Pulse: 88   Resp: 18   Temp: 98.1 °F (36.7 °C)   SpO2: 97%     GEN:  Sitting in NAD   HEENT/NECK: MMM  CARDIAC: Regular rate rhythm, no murmers, no rubs, no gallops  LUNGS:  clear to auscultation, no wheezes, rales, or rhonchi  ABDOMEN: Soft,  "non-tender, non-distended, normal active bowel sounds  EXTREMITIES/SKIN:  no calf edema, no calf tenderness to palpation  NEURO:   MENTAL STATUS: Adequate wakefulness, able to follow simple commands and Strength/MMT:  Unchanged  PSYCH:  Affect:  Euthymic     PMR CS HPI:  \"Paras Pitts is a 68 y.o. 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  CT head showing right frontal lobe vasogenic edema with known underlying lesion.  MRI brain showing a right superior frontal mass resection and chemoradiation with unchanged linear enhancement along the surgical cavity compared to 3/26/2024 favoring radiation necrosis.  Patient seen by neurology.  Video EEG showing: Early in recording there was frequent or nearly continuous focal motor seizure and later there were two subclinical right fronto central electrographic seizures.  Patient placed on the following AED regimen: Keppra 2 g twice daily, Vimpat 200 mg twice daily, Onfi 5 mg twice daily.  Of note patient was significantly sedated with Ativan.   Patient to follow-up with epileptologist as an outpatient.  Patient was seen by radiation oncology regarding edema and radiation necrosis and recommended to increase dexamethasone to 4 mg twice daily.  Patient is under the care of Dr. Langford for medical oncology and is to begin Avastin, chemotherapy radiation therapy for his recurrent " "mediastinal disease post rehabilitation.  After medical stabilization, patient found to have acute functional deficits from his mobility and self-care, therefore admitted to [facility] for acute inpatient rehabilitation.\"    ** Please Note: Fluency Direct voice to text software may have been used in the creation of this document. **    "

## 2024-05-17 NOTE — NUTRITION
24 1315   Biochemical Data,Medical Tests, and Procedures   Biochemical Data/Medical Tests/Procedures Lab values reviewed;Meds reviewed   Labs (Comment)  B, pro:6.1, RBC:3.74   Meds (Comment) cathflo, norvasc, lipitor, onfi, decadron, lovenox, colace, keppra, cozaar, zofran, protonix, mirapex   Nutrition-Focused Physical Exam   Nutrition-Focused Physical Exam Findings RN skin assessment reviewed;No skin issues documented  (no pressure areas noted)   Medical-Related Concerns PMH reviewed   Adequacy of Intake   Nutrition Modality PO   Feeding Route   PO With assistance   Current PO Intake   Current Diet Order 4gm Na diet thin liquids   Current Meal Intake %   Estimated calorie intake compared to estimated need Nutrient needs met.   PES Statement   Problem Continue previous diagnosis   Recommendations/Interventions   Malnutrition/BMI Present No  (does not meet criteria)   Summary 4gm Na diet thin liquids. No nutrition supplements. Meal completions 100%. 5/15 210#, BMI=31.01; 9# weight gain from admission  201#. Medications reviewed. Oriented x4. Left hemiparesis. Missing teeth. Trace RLE edema, nonpitting LLE edema. LBM . Reports appetite remains good.   Interventions/Recommendations Continue current diet order;Monitor I & O's   Education Assessment   Education Education not indicated at this time   Patient Nutrition Goals   Goal Meet PO needs;Avoid weight gain   Goal Status Met;Not met;Extended   Timeframe to complete goal by next f/u   Nutrition Complexity Risk   Nutrition complexity level Low risk   Follow up date 24

## 2024-05-17 NOTE — PLAN OF CARE
Problem: Prexisting or High Potential for Compromised Skin Integrity  Goal: Skin integrity is maintained or improved  Description: INTERVENTIONS:  - Identify patients at risk for skin breakdown  - Assess and monitor skin integrity  - Assess and monitor nutrition and hydration status  - Monitor labs   - Assess for incontinence   - Turn and reposition patient  - Assist with mobility/ambulation  - Relieve pressure over bony prominences  - Avoid friction and shearing  - Provide appropriate hygiene as needed including keeping skin clean and dry  - Evaluate need for skin moisturizer/barrier cream  - Collaborate with interdisciplinary team   - Patient/family teaching  - Consider wound care consult   Outcome: Progressing     Problem: PAIN - ADULT  Goal: Verbalizes/displays adequate comfort level or baseline comfort level  Description: Interventions:  - Encourage patient to monitor pain and request assistance  - Assess pain using appropriate pain scale  - Administer analgesics based on type and severity of pain and evaluate response  - Implement non-pharmacological measures as appropriate and evaluate response  - Consider cultural and social influences on pain and pain management  - Notify physician/advanced practitioner if interventions unsuccessful or patient reports new pain  Outcome: Progressing     Problem: INFECTION - ADULT  Goal: Absence or prevention of progression during hospitalization  Description: INTERVENTIONS:  - Assess and monitor for signs and symptoms of infection  - Monitor lab/diagnostic results  - Monitor all insertion sites, i.e. indwelling lines, tubes, and drains  - Monitor endotracheal if appropriate and nasal secretions for changes in amount and color  - [place] appropriate cooling/warming therapies per order  - Administer medications as ordered  - Instruct and encourage patient and family to use good hand hygiene technique  - Identify and instruct in appropriate isolation precautions for  identified infection/condition  Outcome: Progressing     Problem: SAFETY ADULT  Goal: Patient will remain free of falls  Description: INTERVENTIONS:  - Educate patient/family on patient safety including physical limitations  - Instruct patient to call for assistance with activity   - Consult OT/PT to assist with strengthening/mobility   - Keep Call bell within reach  - Keep bed low and locked with side rails adjusted as appropriate  - Keep care items and personal belongings within reach  - Initiate and maintain comfort rounds  - Make Fall Risk Sign visible to staff  - Apply yellow socks and bracelet for high fall risk patients  - Consider moving patient to room near nurses station  Outcome: Progressing     Problem: Nutrition/Hydration-ADULT  Goal: Nutrient/Hydration intake appropriate for improving, restoring or maintaining nutritional needs  Description: Monitor and assess patient's nutrition/hydration status for malnutrition. Collaborate with interdisciplinary team and initiate plan and interventions as ordered.  Monitor patient's weight and dietary intake as ordered or per policy. Utilize nutrition screening tool and intervene as necessary. Determine patient's food preferences and provide high-protein, high-caloric foods as appropriate.     INTERVENTIONS:  - Monitor oral intake, urinary output, labs, and treatment plans  - Assess nutrition and hydration status and recommend course of action  - Evaluate amount of meals eaten  - Assist patient with eating if necessary   - Allow adequate time for meals  - Recommend/ encourage appropriate diets, oral nutritional supplements, and vitamin/mineral supplements  - Order, calculate, and assess calorie counts as needed  - Recommend, monitor, and adjust tube feedings and TPN/PPN based on assessed needs  - Assess need for intravenous fluids  - Provide specific nutrition/hydration education as appropriate  - Include patient/family/caregiver in decisions related to  nutrition  Outcome: Progressing     Problem: NEUROSENSORY - ADULT  Goal: Achieves stable or improved neurological status  Description: INTERVENTIONS  - Monitor and report changes in neurological status  - Monitor vital signs such as temperature, blood pressure, glucose, and any other labs ordered   - Initiate measures to prevent increased intracranial pressure  - Monitor for seizure activity and implement precautions if appropriate      Outcome: Progressing

## 2024-05-17 NOTE — PROGRESS NOTES
05/17/24 0857   Pain Assessment   Pain Assessment Tool 0-10   Pain Score No Pain   Restrictions/Precautions   Precautions Bed/chair alarms;Cognitive;Fall Risk;Contact/isolation;Multiple lines;Seizure;Supervision on toilet/commode  (R chest wall port for chemo)   Braces or Orthoses   (off shelf AFO LLE)   Cognition   Overall Cognitive Status Impaired   Arousal/Participation Alert;Responsive;Cooperative   Attention Attends with cues to redirect   Orientation Level Oriented X4   Memory Decreased short term memory;Decreased recall of precautions   Following Commands Follows one step commands without difficulty   Sit to Stand   Type of Assistance Needed Physical assistance;Verbal cues   Physical Assistance Level 26%-50%   Comment min-mod A STS with PFRW   Sit to Stand CARE Score 3   Bed-Chair Transfer   Type of Assistance Needed Physical assistance;Verbal cues   Physical Assistance Level 26%-50%   Comment min-mod A SPT using grab bar from toilet to wheelchair   Chair/Bed-to-Chair Transfer CARE Score 3   Walk 10 Feet   Type of Assistance Needed Physical assistance;Verbal cues   Physical Assistance Level Total assistance   Comment mod A x 1 level and unlevel surfaces; second person for wheelchair follow   Walk 10 Feet CARE Score 1   Walk 50 Feet with Two Turns   Type of Assistance Needed Physical assistance;Verbal cues   Physical Assistance Level Total assistance   Comment mod A x 1 level and unlevel surfaces; second person for wheelchair follow   Walk 50 Feet with Two Turns CARE Score 1   Walking 10 Feet on Uneven Surfaces   Type of Assistance Needed Physical assistance;Verbal cues   Physical Assistance Level Total assistance   Comment mod A x 1 level and unlevel surfaces; second person for wheelchair follow   Walking 10 Feet on Uneven Surfaces CARE Score 1   Ambulation   Primary Mode of Locomotion Prior to Admission Walk   Distance Walked (feet) 56 ft  (43' 56' 44')   Assist Device Platform;Roller Walker   Gait  "Pattern Inconsistant Chioma;Slow [place];Decreased foot clearance;L foot drag;Forward Flexion;L hemiparesis;Narrow ABHINAV;Step to;Decreased L stance;Improper weight shift   Limitations Noted In Balance;Coordination;Device Management;Endurance;Heel Strike;Posture;Safety;Speed;Strength   Provided Assistance with: Balance;Trunk Support;Weight Shift   Walk Assist Level Moderate Assist;Chair Follow   Findings mod A x 1 level and unlevel surfaces; second person for wheelchair follow   Does the patient walk? 2. Yes   Wheel 50 Feet with Two Turns   Type of Assistance Needed Supervision;Verbal cues   Comment S level and unlevel surfaces   Wheel 50 Feet with Two Turns CARE Score 4   Wheel 150 Feet   Type of Assistance Needed Supervision;Verbal cues   Comment S level and unlevel surfaces   Wheel 150 Feet CARE Score 4   Wheelchair mobility   Does the patient use a wheelchair? 1. Yes   Type of Wheelchair Used 1. Manual   Method Right upper extremity;Left lower extremity   Assistance Provided For Remove Leg Rest;Replace Leg Rest;Remove armrests;Replace armrests   Distance Level Surface (feet) 202 ft  (202' 152' 137')   Distance Wheeled 3% Grade 12 ft   Findings S level and unlevel surfaces   Toilet Transfer   Type of Assistance Needed Physical assistance;Verbal cues   Physical Assistance Level 26%-50%   Comment min-mod A SPT using grab bar from toilet to wheelchair   Toilet Transfer CARE Score 3   Therapeutic Interventions   Strengthening seated and standing ther ex   Balance gait and transfer training with use of off shelf AFO LLE   Other wheelchair mobility   Assessment   Treatment Assessment Patient agreeable to therapy session.   No pain reported.   Pt reports feeling \"tired from the chemo.\"   Patient's wife brought in sneakers.   Trialed off shelf AFO LLE with positive results.    Required increased rest periods t/o therapy session due to overall increased fatigue and decreased endurance.  Completed ther ex for general LE " strengthening as well as improved LLE control; gait and transfer training focusing on sequence and technique for improved balance and safety with functional mobility using walker.   Propels wheelchair S level and unlevel surfaces with increased time to complete.   PT Barriers   Physical Impairment Decreased strength;Decreased range of motion;Decreased endurance;Impaired balance;Decreased mobility;Decreased coordination;Decreased safety awareness;Impaired tone   Functional Limitation Wheelchair management;Walking;Transfers;Standing;Stair negotiation;Ramp negotiation;Car transfers   Plan   Treatment/Interventions Functional transfer training;LE strengthening/ROM;Therapeutic exercise;Gait training  (wheelchair mobility)   Progress Progressing toward goals   PT Therapy Minutes   PT Time In 0857   PT Time Out 1030   PT Total Time (minutes) 93   PT Mode of treatment - Individual (minutes) 93   PT Mode of treatment - Concurrent (minutes) 0   PT Mode of treatment - Group (minutes) 0   PT Mode of treatment - Co-treat (minutes) 0   PT Mode of Treatment - Total time(minutes) 93 minutes   PT Cumulative Minutes 1323   Therapy Time missed   Time missed? No

## 2024-05-18 PROCEDURE — 97110 THERAPEUTIC EXERCISES: CPT

## 2024-05-18 PROCEDURE — 97112 NEUROMUSCULAR REEDUCATION: CPT

## 2024-05-18 PROCEDURE — 97530 THERAPEUTIC ACTIVITIES: CPT

## 2024-05-18 PROCEDURE — 97116 GAIT TRAINING THERAPY: CPT

## 2024-05-18 PROCEDURE — 97535 SELF CARE MNGMENT TRAINING: CPT

## 2024-05-18 RX ADMIN — LEVETIRACETAM 2000 MG: 500 TABLET, FILM COATED ORAL at 08:08

## 2024-05-18 RX ADMIN — ATORVASTATIN CALCIUM 40 MG: 40 TABLET, FILM COATED ORAL at 17:17

## 2024-05-18 RX ADMIN — LOSARTAN POTASSIUM 50 MG: 50 TABLET, FILM COATED ORAL at 08:08

## 2024-05-18 RX ADMIN — CLOBAZAM 5 MG: 10 TABLET ORAL at 17:17

## 2024-05-18 RX ADMIN — CLOBAZAM 5 MG: 10 TABLET ORAL at 08:08

## 2024-05-18 RX ADMIN — DEXAMETHASONE 4 MG: 4 TABLET ORAL at 08:08

## 2024-05-18 RX ADMIN — LACOSAMIDE 200 MG: 150 TABLET ORAL at 08:08

## 2024-05-18 RX ADMIN — LACOSAMIDE 200 MG: 150 TABLET ORAL at 20:40

## 2024-05-18 RX ADMIN — PRAMIPEXOLE DIHYDROCHLORIDE 0.25 MG: 0.12 TABLET ORAL at 20:40

## 2024-05-18 RX ADMIN — GUAIFENESIN 600 MG: 600 TABLET ORAL at 20:40

## 2024-05-18 RX ADMIN — PRAMIPEXOLE DIHYDROCHLORIDE 0.25 MG: 0.12 TABLET ORAL at 17:17

## 2024-05-18 RX ADMIN — AMLODIPINE BESYLATE 10 MG: 10 TABLET ORAL at 08:08

## 2024-05-18 RX ADMIN — POLYETHYLENE GLYCOL 3350 17 G: 17 POWDER, FOR SOLUTION ORAL at 08:08

## 2024-05-18 RX ADMIN — PANTOPRAZOLE SODIUM 40 MG: 40 TABLET, DELAYED RELEASE ORAL at 05:20

## 2024-05-18 RX ADMIN — ENOXAPARIN SODIUM 40 MG: 40 INJECTION SUBCUTANEOUS at 08:08

## 2024-05-18 RX ADMIN — GUAIFENESIN 600 MG: 600 TABLET ORAL at 08:08

## 2024-05-18 RX ADMIN — LEVETIRACETAM 2000 MG: 500 TABLET, FILM COATED ORAL at 20:40

## 2024-05-18 RX ADMIN — PRAMIPEXOLE DIHYDROCHLORIDE 0.25 MG: 0.12 TABLET ORAL at 08:08

## 2024-05-18 NOTE — PROGRESS NOTES
05/18/24 1230   Pain Assessment   Pain Assessment Tool 0-10   Pain Score No Pain   Cognition   Overall Cognitive Status WFL   Arousal/Participation Alert;Responsive;Cooperative   Attention Attends with cues to redirect   Orientation Level Oriented X4   Memory Decreased short term memory;Decreased recall of precautions   Following Commands Follows one step commands without difficulty   Sit to Stand   Type of Assistance Needed Physical assistance   Physical Assistance Level 26%-50%   Sit to Stand CARE Score 3   Bed-Chair Transfer   Type of Assistance Needed Physical assistance   Physical Assistance Level 26%-50%   Chair/Bed-to-Chair Transfer CARE Score 3   Transfer Bed/Chair/Wheelchair   Limitations Noted In Balance;Endurance;LE Strength   Adaptive Equipment Stefan Walker   Stand Pivot Minimal Assist   Sit to Stand Minimal Assist   Stand to Sit Minimal Assist   Walk 10 Feet   Type of Assistance Needed Physical assistance   Physical Assistance Level 26%-50%   Walk 10 Feet CARE Score 3   Walk 50 Feet with Two Turns   Type of Assistance Needed Physical assistance   Physical Assistance Level 26%-50%   Walk 50 Feet with Two Turns CARE Score 3   Walk 150 Feet   Reason if not Attempted Safety concerns   Walk 150 Feet CARE Score 88   Ambulation   Primary Mode of Locomotion Prior to Admission Walk   Distance Walked (feet)   (48,10,55)   Assist Device Stefan Walker   Gait Pattern Inconsistant Chioma   Limitations Noted In Balance;Coordination;Endurance;Safety;Strength   Provided Assistance with: Balance   Does the patient walk? 2. Yes   Therapeutic Interventions   Strengthening standing and seated TE   Balance static and dynamic standing balance   Assessment   Treatment Assessment Patient tolerated the treatment well today, he was already seated in WC upon start of session. Patient required moderate cues for proper hand placement and body mechanics with transfers. Patient was able to ambulate today with HW with with verbal and  manual cues for posture, HW management and safety. His mobility is limited due to decreased endurance, pain and balance. Patient will continue to benefit from skilled therapy services to maximize his LOF   Problem List Decreased strength;Decreased range of motion;Decreased endurance;Impaired balance;Decreased coordination;Decreased cognition;Impaired judgement;Decreased safety awareness   Barriers to Discharge Decreased caregiver support;Inaccessible home environment   PT Barriers   Physical Impairment Decreased strength;Decreased range of motion;Decreased endurance;Impaired balance;Decreased mobility;Decreased coordination;Decreased safety awareness;Impaired tone   Functional Limitation Wheelchair management;Walking;Transfers;Standing;Stair negotiation;Ramp negotiation;Car transfers   Plan   Treatment/Interventions LE strengthening/ROM;Functional transfer training;Therapeutic exercise;Endurance training;Patient/family training;Equipment eval/education;Gait training;Bed mobility;Compensatory technique education;Continued evaluation   Progress Progressing toward goals   PT Therapy Minutes   PT Time In 1230   PT Time Out 1330   PT Total Time (minutes) 60   PT Mode of treatment - Individual (minutes) 60   Therapy Time missed   Time missed? No

## 2024-05-18 NOTE — PLAN OF CARE
Problem: MOBILITY - ADULT  Goal: Maintain or return to baseline ADL function  Description: INTERVENTIONS:  -  Assess patient's ability to carry out ADLs; assess patient's baseline for ADL function and identify physical deficits which impact ability to perform ADLs (bathing, care of mouth/teeth, toileting, grooming, dressing, etc.)  - Assess/evaluate cause of self-care deficits   - Assess range of motion  - Assess patient's mobility; develop plan if impaired  - Assess patient's need for assistive devices and provide as appropriate  - Encourage maximum independence but intervene and supervise when necessary  - Involve family in performance of ADLs  - Assess for home care needs following discharge   - Consider OT consult to assist with ADL evaluation and planning for discharge  - Provide patient education as appropriate  Outcome: Progressing     Problem: NEUROSENSORY - ADULT  Goal: Achieves stable or improved neurological status  Description: INTERVENTIONS  - Monitor and report changes in neurological status  - Monitor vital signs such as temperature, blood pressure, glucose, and any other labs ordered   - Initiate measures to prevent increased intracranial pressure  - Monitor for seizure activity and implement precautions if appropriate      Outcome: Progressing

## 2024-05-18 NOTE — NURSING NOTE
Patient is awake and alert. Skin warm and dry. Respirations easy on room air. Participated in therapy sessions and tolerated same well.  Offering not complaints of pain or discomfort. Call bell in reach at bedside. Safety maintained

## 2024-05-18 NOTE — PROGRESS NOTES
"   05/18/24 0831   Pain Assessment   Pain Assessment Tool 0-10   Pain Score No Pain   Restrictions/Precautions   Precautions Bed/chair alarms;Cognitive;Fall Risk;Multiple lines;Seizure;Supervision on toilet/commode  (iso precautions lifted 48hrs after chemo)   Weight Bearing Restrictions No   ROM Restrictions No   Oral Hygiene   Type of Assistance Needed Independent   Physical Assistance Level No physical assistance   Comment increased time; pt self propelled to sink   Oral Hygiene CARE Score 6   Sit to Stand   Type of Assistance Needed Physical assistance   Physical Assistance Level 26%-50%   Comment increased time to adjust bilat feet prior to standing for adequate ABHINAV   Sit to Stand CARE Score 3   Functional Standing Tolerance   Time 2m10s, 1m25s   Activity clothespin hanging/removing   Comments overall fair+ static standing balance as fatigue increased but initially good-, supported unilaterally on table with LUE with additional support from OT on L side to maintain midline standing posture   Exercise Tools   Other Exercise Tool 1 sanderbox x20 all planes of motion using bilat UE   Other Exercise Tool 2 fxl reacher activity retrieving clothespins from floor, used bilat UE to  pins on each side of w/c, increased time to use LUE   Cognition   Overall Cognitive Status WFL   Arousal/Participation Alert;Responsive;Cooperative   Attention Attends with cues to redirect   Orientation Level Oriented X4   Memory Decreased short term memory;Decreased recall of precautions   Following Commands Follows one step commands without difficulty   Assessment   Treatment Assessment Pt agreeable to OT session this AM. Received sitting upright in w/c finishing breakfast. Pt reported feeling slightly dizzy/\"chemo head\" from chemo session 3 days ago but was willing to complete therapy. Oral care completed with set up seated at sink, VC's to incorporate LUE. Pt completed ROM/strength and standing/activity tolerance activities with " Operative Note    Patient: Rosalio Garcia MRN: [id number]  Surgery Date: 8/17/2022  [facility]          Procedure  Primary Surgeon    RIGHT TKA/ HIGH BMI [facility])  Stacey Langston MD    * Panel 2 does not exist *  * Panel 2 does not exist *    * Panel 3 does not exist *  * Panel 3 does not exist *     Surgeon(s) and Role:     * Stacey Langston MD - Primary    Other OR Staff/Assistants:  Circ-1: Isabel Nazario  Scrub Tech-1: Michelle Gipson  Scrub Tech-2: Quintin Fatima    1st Assistant Tasks:  Closing    Pre-operative Diagnosis:  Primary osteoarthritis of right knee [M17.11]    Post-operative Diagnosis: same as preop diagnosis    Anesthesia Type: Spinal     Findings: djd    Complications: No    EBL: 50 cc    Specimens: None    Implants       Implant    Cement [facility] Hv R+G 40gm -- [place] R+G [id number] - [id number] - Implanted   (Right) Knee      Inventory item: CEMENT E [facility] HV R+G 40GM -- PALACOS R+G [id number] Model/Cat number: [id number]    : [employer] Lot number: [id number]      As of 8/17/2022       Status: Implanted                      Component Fem Sz 8 R Knee Post Stbl Gume Attune - [id number] - Implanted   (Right) Knee      Inventory item: COMPONENT FEM SZ 8 R KNEE POST STBL GUME ATTUNE Model/Cat number: [id number]    : [employer] Lot number: [id number]    Device identifier: [id number] Device identifier type: GS1      GUDID Information       Request status Successful        Brand name: ATTUNE Version/Model: 1504-    Company name: [employer] ([employer]) MRI safety info as of 8/17/22: Labeling does not contain MRI Safety Information    Contains dry or latex rubber: No      GMDN P.T. name: Uncoated knee femur prosthesis, metallic                As of 8/17/2022       Status: Implanted                      Component Pat Byc86nv Polyeth Dome Gume Medialized Attune - [id number] - Implanted   (Right) Knee      Inventory item: COMPONENT PAT SMX69RY POLYETH DOME DG MEDIALIZED ATTUNE Model/Cat number: [id number]    : [employer] Lot number: [id number]    Device identifier: [id number] Device identifier type: GS1      GUDID Information       Request status Successful        Brand name: SARAH Version/Model: 1518-    Company name: [address] [place] [address]) MRI safety info as of 8/17/22: Labeling does not contain MRI Safety Information    Contains dry or latex rubber: No      GMDN P.T. name: Polyethylene patella prosthesis                As of 8/17/2022       Status: Implanted                      Baseplate Tib Sz 8 Rot Platfrm Co Chrom Molybdenum Ti Chancy Stallion - [id number] - Implanted   (Right) Knee      Inventory item: BASEPLATE TIB SZ 8 ROT PLATFRM CO CHROM MOLYBDENUM TI ALLY Model/Cat number: [id number]    : [employer] Lot number: [id number]    Device identifier: [id number] Device identifier type: [id number] Information       Request status Successful        Brand name: SARAH Version/Model: 1506-    Company name: [employer] MRI safety info as of 8/17/22: Labeling does not contain MRI Safety Information    Contains dry or latex rubber: No      GMDN P.T. name: Uncoated knee tibia prosthesis, metallic                As of 8/17/2022       Status: Implanted                      Insert Tib Sz 8 Thk5mm Knee Post Stbl Rot Platfrm Attune - [id number] - Implanted   (Right) Knee      Inventory item: INSERT TIB SZ 8 THK5MM KNEE POST STBL ROT PLATFRM ATTUNE Model/Cat number: [id number]    : [employer] Lot number: [id number]    Device identifier: [id number] Device identifier type: GS1      GUDID Information       Request status Successful        Brand name: SARAH Version/Model: 1516-    Company name: [employer] ([place]) MRI safety info as of 8/17/22: Labeling does not contain MRI Safety Information    Contains dry or latex rubber: No      GMDN P.T. name: Tibial good tolerance. Increased time required to use LUE for fxl reacher activity, occasionally supported LUE with RUE prior to taking rest breaks. Pt's static standing balance decreased as fatigue increased; VC's to problem solve foot placement/ABHINAV and obtaining midline posture. Pt an active participant in therapy despite fatigue. Pt's barriers to d/c include decreased strength throughout but especially L side s/p previous CVA, decreased balance, impaired coordination L side, impaired safety awareness, problem solving, and attention, and decreased activity tolerance; all affect independence in self care and fxl transfers. Pt would benefit from continued skilled OT services in order to address listed barriers and prepare for safe d/c.   Prognosis Good   Problem List Decreased strength;Decreased range of motion;Decreased endurance;Impaired balance;Decreased coordination;Decreased cognition;Impaired judgement;Decreased safety awareness   Plan   Treatment/Interventions ADL retraining;Functional transfer training;LE strengthening/ROM;Endurance training;Therapeutic exercise;Cognitive reorientation;Patient/family training;Equipment eval/education;Compensatory technique education;OT   Progress Progressing toward goals   OT Therapy Minutes   OT Time In 0831   OT Time Out 0930   OT Total Time (minutes) 59   OT Mode of treatment - Individual (minutes) 59        insert                As of 8/17/2022       Status: Implanted                               Operative procedure: Total knee replacement    OPERATIVE PROCEDURE:  Please note the first assistant role was to help in patient positioning and draping of the extremity in a sterile fashion. Also during the surgery the assistant's responsibilities included but not limited to extremity positioning during critical portions of the surgery. Assisting in using and placement of retractors during surgery. Lower extremity was prepped and draped in a sterile fashion. After adequate anesthesia was given, the patient was placed in a well-padded supine position. Subvastus arthrotomy from the tibial tubercle to the superior pole of the patella was made. Knee was hyperflexed. Intramedullary reaming of distal femur and proximal tibia was performed. 10 mm of distal femur was cut. Anterior-posterior sizing guide was used. Anterior, posterior, chamfer cuts, and box cuts were made next. Proximal tibial cut and preparation performed. Posterior osteophyte meniscal remnants were removed, and also patella was everted. Free-hand cut of the patella was made. Trial components were placed. The patient was found to have excellent range of motion and stability with all trial components. All the trial components removed. Copious irrigation performed. Distal femur, proximal tibia, and patella were impacted in place. Excessive cement was removed. After the cement was hard, Subvastus arthrotomy closed with Vicryl stitch. Compressive dressing was applied. The patient was taken to PACU in stable condition. Please note due to the patient's BMI of greater than 30 significant surgical effort was required compared to the standard patient with a BMI lower than 30. Surgical time increased approximately 30% from the normal surgical time due to the patient's high BMI.   Because of the high BMI patient's knee would be considered a complex total knee replacement rather than a standard total knee replacement.       Sue Russell MD

## 2024-05-19 PROCEDURE — 97110 THERAPEUTIC EXERCISES: CPT

## 2024-05-19 PROCEDURE — 97112 NEUROMUSCULAR REEDUCATION: CPT

## 2024-05-19 PROCEDURE — 97535 SELF CARE MNGMENT TRAINING: CPT

## 2024-05-19 PROCEDURE — 92507 TX SP LANG VOICE COMM INDIV: CPT | Performed by: NURSE PRACTITIONER

## 2024-05-19 PROCEDURE — 97116 GAIT TRAINING THERAPY: CPT

## 2024-05-19 PROCEDURE — 97530 THERAPEUTIC ACTIVITIES: CPT

## 2024-05-19 RX ADMIN — LEVETIRACETAM 2000 MG: 500 TABLET, FILM COATED ORAL at 08:25

## 2024-05-19 RX ADMIN — PRAMIPEXOLE DIHYDROCHLORIDE 0.25 MG: 0.12 TABLET ORAL at 21:35

## 2024-05-19 RX ADMIN — ATORVASTATIN CALCIUM 40 MG: 40 TABLET, FILM COATED ORAL at 17:07

## 2024-05-19 RX ADMIN — PANTOPRAZOLE SODIUM 40 MG: 40 TABLET, DELAYED RELEASE ORAL at 05:30

## 2024-05-19 RX ADMIN — DEXAMETHASONE 4 MG: 4 TABLET ORAL at 08:25

## 2024-05-19 RX ADMIN — LACOSAMIDE 200 MG: 150 TABLET ORAL at 21:35

## 2024-05-19 RX ADMIN — LEVETIRACETAM 2000 MG: 500 TABLET, FILM COATED ORAL at 21:00

## 2024-05-19 RX ADMIN — AMLODIPINE BESYLATE 10 MG: 10 TABLET ORAL at 08:25

## 2024-05-19 RX ADMIN — CLOBAZAM 5 MG: 10 TABLET ORAL at 17:07

## 2024-05-19 RX ADMIN — LOSARTAN POTASSIUM 50 MG: 50 TABLET, FILM COATED ORAL at 08:25

## 2024-05-19 RX ADMIN — GUAIFENESIN 600 MG: 600 TABLET ORAL at 21:35

## 2024-05-19 RX ADMIN — ENOXAPARIN SODIUM 40 MG: 40 INJECTION SUBCUTANEOUS at 08:26

## 2024-05-19 RX ADMIN — GUAIFENESIN AND DEXTROMETHORPHAN 10 ML: 100; 10 SYRUP ORAL at 18:52

## 2024-05-19 RX ADMIN — CLOBAZAM 5 MG: 10 TABLET ORAL at 08:25

## 2024-05-19 RX ADMIN — LACOSAMIDE 200 MG: 150 TABLET ORAL at 08:25

## 2024-05-19 RX ADMIN — PRAMIPEXOLE DIHYDROCHLORIDE 0.25 MG: 0.12 TABLET ORAL at 08:25

## 2024-05-19 RX ADMIN — GUAIFENESIN 600 MG: 600 TABLET ORAL at 08:25

## 2024-05-19 RX ADMIN — PRAMIPEXOLE DIHYDROCHLORIDE 0.25 MG: 0.12 TABLET ORAL at 17:07

## 2024-05-19 RX ADMIN — GUAIFENESIN AND DEXTROMETHORPHAN 10 ML: 100; 10 SYRUP ORAL at 05:33

## 2024-05-19 NOTE — PROGRESS NOTES
05/19/24 0703   Pain Assessment   Pain Assessment Tool 0-10   Pain Score No Pain   Restrictions/Precautions   Precautions Bed/chair alarms;Cognitive;Fall Risk;Multiple lines;Seizure;Supervision on toilet/commode  (chemo port L chest wall)   Weight Bearing Restrictions No   ROM Restrictions No   Eating   Type of Assistance Needed Set-up / clean-up   Physical Assistance Level No physical assistance   Comment assist to open milk and honey   Eating CARE Score 5   Oral Hygiene   Type of Assistance Needed Set-up / clean-up   Physical Assistance Level No physical assistance   Oral Hygiene CARE Score 5   Grooming   Able To Initiate Tasks;Comb/Brush Hair;Wash/Dry Face;Brush/Clean Teeth   Limitation Noted In Coordination;Safety;Strength   Shower/Bathe Self   Type of Assistance Needed Physical assistance   Physical Assistance Level 25% or less   Comment CHG wipes used; assist for buttocks   Shower/Bathe Self CARE Score 3   Bathing   Assessed Bath Style Sponge Bath   Anticipated D/C Bath Style Sponge Bath;Shower   Able to Gather/Transport No   Able to Wash/Rinse/Dry (body part) Left Arm;Right Arm;L Upper Leg;R Upper Leg;L Lower Leg/Foot;R Lower Leg/Foot;Chest;Abdomen;Perineal Area   Limitations Noted in Balance;Coordination;Endurance;Problem Solving;ROM;Safety;Strength   Positioning Seated;Standing   Upper Body Dressing   Type of Assistance Needed Physical assistance   Physical Assistance Level 25% or less   Comment assist to doff shirt over head, align shirt to correctly don, push LUE entirely through shirt sleeve   Upper Body Dressing CARE Score 3   Lower Body Dressing   Type of Assistance Needed Physical assistance   Physical Assistance Level 25% or less   Comment assist to untangle underwear from RLE when donning, pull clothing over L hip and buttock   Lower Body Dressing CARE Score 3   Putting On/Taking Off Footwear   Type of Assistance Needed Physical assistance   Physical Assistance Level 51%-75%   Comment assist to  don INDER's, bilat socks and shoes   Putting On/Taking Off Footwear CARE Score 2   Dressing/Undressing Clothing   Remove UB Clothes Pullover Shirt   Don UB Clothes Pullover Shirt   Remove LB Clothes Undergarment;Socks   Don LB Clothes Pants;Undergarment;Socks;TEDs;Shoes   Limitations Noted In Balance;Coordination;Endurance;Problem Solving;Safety;ROM;Strength   Adaptive Equipment Reacher   Positioning Supported Sit;Standing   Roll Left and Right   Type of Assistance Needed Supervision   Physical Assistance Level No physical assistance   Roll Left and Right CARE Score 4   Lying to Sitting on Side of Bed   Type of Assistance Needed Physical assistance   Physical Assistance Level 26%-50%   Comment assist to bring trunk upright to EOB from sidelying   Lying to Sitting on Side of Bed CARE Score 3   Sit to Stand   Type of Assistance Needed Physical assistance   Physical Assistance Level 26%-50%   Sit to Stand CARE Score 3   Bed-Chair Transfer   Type of Assistance Needed Physical assistance   Physical Assistance Level 26%-50%   Chair/Bed-to-Chair Transfer CARE Score 3   Transfer Bed/Chair/Wheelchair   Limitations Noted In Balance;Coordination;Endurance;UE Strength;LE Strength   Coordination   Fine Motor placed wooden pegs into pegboard with LUE, slightly increased time as fatigue increased however overall improved pinch  and endurance   Cognition   Overall Cognitive Status WFL   Arousal/Participation Alert;Responsive;Cooperative   Attention Attends with cues to redirect   Orientation Level Oriented X4   Memory Decreased short term memory;Decreased recall of precautions   Following Commands Follows one step commands without difficulty   Assessment   Treatment Assessment Pt agreeable to OT session this AM. Received lying supine in bed. ADL session completed; current LOF and details listed in respective sections. Pt is overall Alek LB/UB dressing and bathing, set up grooming and eating; fxl transfers overall Alek with  increased time for LLE placement as well as maintaining balance during initial sit>stand motion. Pt with increased difficulty donning socks and demonstrated SOB with wheezing and coughing; OT assisted with donning to prevent further LEHMAN and fatigue. Pt's LUE continues with generalized impaired ROM/strength and coordination, however is improving in endurance and coordination overall since beginning of ARU stay. Pt is an active and motivated participant in therapy. Pt's barriers to d/c include decreased strength throughout but especially L side s/p previous CVA, decreased balance, impaired coordination L side, impaired safety awareness, problem solving, and attention, and decreased activity tolerance; all affect independence in self care and fxl transfers. Pt would benefit from continued skilled OT services in order to address listed barriers and prepare for safe d/c.   Prognosis Good   Problem List Decreased strength;Decreased range of motion;Decreased endurance;Impaired balance;Decreased coordination;Decreased cognition;Impaired judgement;Decreased safety awareness   Plan   Treatment/Interventions ADL retraining;Functional transfer training;LE strengthening/ROM;Therapeutic exercise;Endurance training;Cognitive reorientation;Equipment eval/education;Patient/family training;Compensatory technique education;OT   Progress Progressing toward goals   OT Therapy Minutes   OT Time In 0703   OT Time Out 0826   OT Total Time (minutes) 83   OT Mode of treatment - Individual (minutes) 83

## 2024-05-19 NOTE — PROGRESS NOTES
05/19/24 0960   Pain Assessment   Pain Assessment Tool 0-10   Pain Score No Pain   Patient's Stated Pain Goal No pain   Restrictions/Precautions   Precautions Bed/chair alarms;Cognitive;Fall Risk;Multiple lines;Seizure;Supervision on toilet/commode   Weight Bearing Restrictions No   ROM Restrictions No   Braces or Orthoses   (off shelf AFO LLE)   Cognition   Overall Cognitive Status WFL   Arousal/Participation Alert;Responsive;Cooperative   Attention Attends with cues to redirect   Orientation Level Oriented X4   Memory Decreased short term memory;Decreased recall of precautions   Following Commands Follows one step commands without difficulty   Comments sitting in w/c in room   Subjective   Subjective agreeable to PT   Sit to Stand   Type of Assistance Needed Physical assistance   Physical Assistance Level 26%-50%   Comment increased time; PFRW   Sit to Stand CARE Score 3   Bed-Chair Transfer   Type of Assistance Needed Physical assistance   Physical Assistance Level 26%-50%   Comment MOD A; PFRW   Chair/Bed-to-Chair Transfer CARE Score 3   Transfer Bed/Chair/Wheelchair   Limitations Noted In Balance;Endurance;LE Strength;UE Strength   Adaptive Equipment Platform;Roller Walker   Stand Pivot Minimal Assist   Sit to Stand Minimal Assist   Stand to Sit Minimal Assist   Findings pt requires mod A for LLE advancement; MAFO Intact   Car Transfer   Reason if not Attempted Environmental limitations   Car Transfer CARE Score 10   Walk 10 Feet   Type of Assistance Needed Physical assistance   Physical Assistance Level 51%-75%   Comment mod A x 1; w/c follow   Walk 10 Feet CARE Score 2   Walk 50 Feet with Two Turns   Reason if not Attempted Safety concerns   Walk 50 Feet with Two Turns CARE Score 88   Walk 150 Feet   Reason if not Attempted Safety concerns   Walk 150 Feet CARE Score 88   Walking 10 Feet on Uneven Surfaces   Reason if not Attempted Safety concerns   Walking 10 Feet on Uneven Surfaces CARE Score 88    Ambulation   Primary Mode of Locomotion Prior to Admission Walk   Distance Walked (feet) 45 ft  (15')   Assist Device Platform;Roller Walker   Gait Pattern Improper weight shift;Narrow ABHINAV;L hemiparesis;L foot drag;Inconsistant Chioma   Limitations Noted In Balance;Coordination;Endurance;Safety;Strength;Heel Strike;Swing   Provided Assistance with: Balance;Limb Advancement;Weight Shift   Walk Assist Level Moderate Assist   Findings mod A x 1 level surfaces; second person for wheelchair follow   Does the patient walk? 2. Yes   Therapeutic Interventions   Strengthening seated TE to b/l LE;   Flexibility b/l hams/calf stretch manually   Balance gait/transfers   Assessment   Treatment Assessment Pt presents in w/c sitting in room.  Pt was agreeable to the session. Began treatment session applying MAFO to LLE.  Performed sit tostand with min A; ambulated 15ft with PFRW however pt needed much assist with LLE advancement and w/c follow; performed seated b/lTE in PT along with b/l hams/calf stretching . Pt tolerated the session well with no  pain this morning.   Increased time needed for transfers and gait.   Pt then ambulated again at end of session and was able to walk 45 ft with PFRW and less assistance to advance LLE with w/c follow.  Pt demonstrates  improvements in gait at end of PT session.   Pt continues to have difficulty with endurance, gait, strength, rom, balance, overall functional mobility and would benefit from further skilled PT sessions to increase independence in preparation for a safe discharge home.   Problem List Decreased strength;Decreased range of motion;Decreased endurance;Impaired balance;Decreased coordination;Decreased cognition;Impaired judgement;Decreased safety awareness   Barriers to Discharge Decreased caregiver support;Inaccessible home environment   PT Barriers   Physical Impairment Decreased strength;Decreased range of motion;Decreased endurance;Impaired balance;Decreased  mobility;Decreased coordination;Decreased safety awareness;Impaired tone   Functional Limitation Wheelchair management;Walking;Transfers;Standing;Stair negotiation;Ramp negotiation;Car transfers   Plan   Treatment/Interventions ADL retraining;Functional transfer training;LE strengthening/ROM;Therapeutic exercise;Endurance training;Patient/family training;Bed mobility;Gait training   Progress Progressing toward goals   PT Therapy Minutes   PT Time In 0930   PT Time Out 1030   PT Total Time (minutes) 60

## 2024-05-19 NOTE — PROGRESS NOTES
05/19/24 1500   Pain Assessment   Pain Assessment Tool 0-10   Pain Score No Pain   Restrictions/Precautions   Precautions Bed/chair alarms;Cognitive;Fall Risk;Supervision on toilet/commode   Comprehension   Comprehension (FIM) 6 - Has only MILD difficulty with complex/abstract info   Expression   Expression (FIM) 6 - Expresses complex/abstract but requires:  more time   Social Interaction   Social Interaction (FIM) 6 - Interacts appropriately with others BUT requires extra  time   Problem Solving   Problem solving (FIM) 5 - Solves basic problems 90% of time   Memory   Memory (FIM) 5 - Needs cueing reminders <10%   Speech/Language/Cognition Assessmetn   Treatment Assessment 20 minute conversation with adequate processing time, adequate organizational sequencing and no word finding difficulties. Seek out words x10 trials up to 3-5 letter words @100% dasia. deductive reasoning puzzle 3x4 with 8 clues with background distraction of a preferred basketball game completed with. Alternated attention and answered questions pertaining to the game at 100% dasia. He did confirm difficulty walking and talking during PT yesterday.   SLP Therapy Minutes   SLP Time In 1430   SLP Time Out 1545   SLP Total Time (minutes) 75   SLP Mode of treatment - Individual (minutes) 75   SLP Mode of treatment - Concurrent (minutes) 0   SLP Mode of treatment - Group (minutes) 0   SLP Mode of treatment - Co-treat (minutes) 0   SLP Mode of Treatment - Total time(minutes) 75 minutes   SLP Cumulative Minutes 780

## 2024-05-19 NOTE — NURSING NOTE
Patient is awake and alert. OOB to bedside chair  with transfer assist.  Participated in therapy sessions and tolerated same well.  Denies complaints of pain or discomfort. Safety maintained. Call bell in reach at bedside.

## 2024-05-19 NOTE — NURSING NOTE
Patient resting in bed at this time.  No signs of distress noted.  Patient was able to stand pivot transfer with two assist overnight.  Left sided weakness continues.  Left lower extremity multipodas boot in use overnight.  Patient was continent of urine with urinal overnight.  Patient with moist non-productive cough noted this morning PRN robitussin requested and administered.  Bed/chair alarms in use to promote patient safety.  Call bell within reach.  Plan of care ongoing.

## 2024-05-20 ENCOUNTER — HOME HEALTH ADMISSION (OUTPATIENT)
Dept: HOME HEALTH SERVICES | Facility: HOME HEALTHCARE | Age: 69
End: 2024-05-20
Payer: COMMERCIAL

## 2024-05-20 PROBLEM — D61.818 PANCYTOPENIA (HCC): Status: ACTIVE | Noted: 2024-05-20

## 2024-05-20 LAB
ALBUMIN SERPL BCP-MCNC: 3.8 G/DL (ref 3.5–5)
ALP SERPL-CCNC: 42 U/L (ref 34–104)
ALT SERPL W P-5'-P-CCNC: 21 U/L (ref 7–52)
ANION GAP SERPL CALCULATED.3IONS-SCNC: 6 MMOL/L (ref 4–13)
ANISOCYTOSIS BLD QL SMEAR: PRESENT
AST SERPL W P-5'-P-CCNC: 12 U/L (ref 13–39)
BASOPHILS # BLD MANUAL: 0 THOUSAND/UL (ref 0–0.1)
BASOPHILS NFR MAR MANUAL: 0 % (ref 0–1)
BILIRUB SERPL-MCNC: 1.28 MG/DL (ref 0.2–1)
BUN SERPL-MCNC: 12 MG/DL (ref 5–25)
CALCIUM SERPL-MCNC: 8.5 MG/DL (ref 8.4–10.2)
CHLORIDE SERPL-SCNC: 102 MMOL/L (ref 96–108)
CO2 SERPL-SCNC: 32 MMOL/L (ref 21–32)
CREAT SERPL-MCNC: 0.63 MG/DL (ref 0.6–1.3)
EOSINOPHIL # BLD MANUAL: 0.02 THOUSAND/UL (ref 0–0.4)
EOSINOPHIL NFR BLD MANUAL: 1 % (ref 0–6)
ERYTHROCYTE [DISTWIDTH] IN BLOOD BY AUTOMATED COUNT: 14.3 % (ref 11.6–15.1)
GFR SERPL CREATININE-BSD FRML MDRD: 99 ML/MIN/1.73SQ M
GLUCOSE P FAST SERPL-MCNC: 143 MG/DL (ref 65–99)
GLUCOSE SERPL-MCNC: 143 MG/DL (ref 65–140)
HCT VFR BLD AUTO: 35 % (ref 36.5–49.3)
HGB BLD-MCNC: 11.5 G/DL (ref 12–17)
LG PLATELETS BLD QL SMEAR: PRESENT
LYMPHOCYTES # BLD AUTO: 0.9 THOUSAND/UL (ref 0.6–4.47)
LYMPHOCYTES # BLD AUTO: 39 % (ref 14–44)
MCH RBC QN AUTO: 31.9 PG (ref 26.8–34.3)
MCHC RBC AUTO-ENTMCNC: 32.9 G/DL (ref 31.4–37.4)
MCV RBC AUTO: 97 FL (ref 82–98)
METAMYELOCYTE ABSOLUTE CT: 0.02 THOUSAND/UL (ref 0–0.1)
METAMYELOCYTES NFR BLD MANUAL: 1 % (ref 0–1)
MONOCYTES # BLD AUTO: 0.16 THOUSAND/UL (ref 0–1.22)
MONOCYTES NFR BLD: 7 % (ref 4–12)
MYELOCYTE ABSOLUTE CT: 0.02 THOUSAND/UL (ref 0–0.1)
MYELOCYTES NFR BLD MANUAL: 1 % (ref 0–1)
NEUTROPHILS # BLD MANUAL: 1.18 THOUSAND/UL (ref 1.85–7.62)
NEUTS BAND NFR BLD MANUAL: 3 % (ref 0–8)
NEUTS SEG NFR BLD AUTO: 48 % (ref 43–75)
PELGER HUET CELLS BLD QL SMEAR: PRESENT
PLATELET # BLD AUTO: 101 THOUSANDS/UL (ref 149–390)
PLATELET BLD QL SMEAR: ABNORMAL
PMV BLD AUTO: 10.3 FL (ref 8.9–12.7)
POTASSIUM SERPL-SCNC: 3.9 MMOL/L (ref 3.5–5.3)
PROT SERPL-MCNC: 5.9 G/DL (ref 6.4–8.4)
RBC # BLD AUTO: 3.61 MILLION/UL (ref 3.88–5.62)
RBC MORPH BLD: PRESENT
SODIUM SERPL-SCNC: 140 MMOL/L (ref 135–147)
WBC # BLD AUTO: 2.31 THOUSAND/UL (ref 4.31–10.16)

## 2024-05-20 PROCEDURE — 97530 THERAPEUTIC ACTIVITIES: CPT | Performed by: PHYSICAL THERAPIST

## 2024-05-20 PROCEDURE — 99232 SBSQ HOSP IP/OBS MODERATE 35: CPT | Performed by: PHYSICAL MEDICINE & REHABILITATION

## 2024-05-20 PROCEDURE — 97112 NEUROMUSCULAR REEDUCATION: CPT | Performed by: PHYSICAL THERAPIST

## 2024-05-20 PROCEDURE — 85027 COMPLETE CBC AUTOMATED: CPT

## 2024-05-20 PROCEDURE — 80053 COMPREHEN METABOLIC PANEL: CPT

## 2024-05-20 PROCEDURE — 97112 NEUROMUSCULAR REEDUCATION: CPT

## 2024-05-20 PROCEDURE — 97110 THERAPEUTIC EXERCISES: CPT

## 2024-05-20 PROCEDURE — 97535 SELF CARE MNGMENT TRAINING: CPT

## 2024-05-20 PROCEDURE — 97110 THERAPEUTIC EXERCISES: CPT | Performed by: PHYSICAL THERAPIST

## 2024-05-20 PROCEDURE — 92507 TX SP LANG VOICE COMM INDIV: CPT | Performed by: NURSE PRACTITIONER

## 2024-05-20 PROCEDURE — 87070 CULTURE OTHR SPECIMN AEROBIC: CPT | Performed by: FAMILY MEDICINE

## 2024-05-20 PROCEDURE — 97530 THERAPEUTIC ACTIVITIES: CPT

## 2024-05-20 PROCEDURE — 85007 BL SMEAR W/DIFF WBC COUNT: CPT

## 2024-05-20 RX ORDER — IPRATROPIUM BROMIDE AND ALBUTEROL SULFATE 2.5; .5 MG/3ML; MG/3ML
3 SOLUTION RESPIRATORY (INHALATION) 3 TIMES DAILY
Status: DISCONTINUED | OUTPATIENT
Start: 2024-05-20 | End: 2024-05-24 | Stop reason: HOSPADM

## 2024-05-20 RX ORDER — DIPHENHYDRAMINE HYDROCHLORIDE AND LIDOCAINE HYDROCHLORIDE AND ALUMINUM HYDROXIDE AND MAGNESIUM HYDRO
10 KIT EVERY 4 HOURS PRN
Status: DISCONTINUED | OUTPATIENT
Start: 2024-05-20 | End: 2024-05-24 | Stop reason: HOSPADM

## 2024-05-20 RX ADMIN — PANTOPRAZOLE SODIUM 40 MG: 40 TABLET, DELAYED RELEASE ORAL at 05:46

## 2024-05-20 RX ADMIN — IPRATROPIUM BROMIDE AND ALBUTEROL SULFATE 3 ML: 2.5; .5 SOLUTION RESPIRATORY (INHALATION) at 20:37

## 2024-05-20 RX ADMIN — LEVETIRACETAM 2000 MG: 500 TABLET, FILM COATED ORAL at 20:37

## 2024-05-20 RX ADMIN — LACOSAMIDE 200 MG: 150 TABLET ORAL at 20:37

## 2024-05-20 RX ADMIN — GUAIFENESIN 600 MG: 600 TABLET ORAL at 20:37

## 2024-05-20 RX ADMIN — GUAIFENESIN AND DEXTROMETHORPHAN 10 ML: 100; 10 SYRUP ORAL at 20:59

## 2024-05-20 RX ADMIN — LACOSAMIDE 200 MG: 150 TABLET ORAL at 09:13

## 2024-05-20 RX ADMIN — LEVETIRACETAM 2000 MG: 500 TABLET, FILM COATED ORAL at 09:13

## 2024-05-20 RX ADMIN — ATORVASTATIN CALCIUM 40 MG: 40 TABLET, FILM COATED ORAL at 17:13

## 2024-05-20 RX ADMIN — AMLODIPINE BESYLATE 10 MG: 10 TABLET ORAL at 09:13

## 2024-05-20 RX ADMIN — CLOBAZAM 5 MG: 10 TABLET ORAL at 09:13

## 2024-05-20 RX ADMIN — DEXAMETHASONE 4 MG: 4 TABLET ORAL at 09:13

## 2024-05-20 RX ADMIN — DIPHENHYDRAMINE HYDROCHLORIDE AND LIDOCAINE HYDROCHLORIDE AND ALUMINUM HYDROXIDE AND MAGNESIUM HYDRO 10 ML: KIT at 21:36

## 2024-05-20 RX ADMIN — ENOXAPARIN SODIUM 40 MG: 40 INJECTION SUBCUTANEOUS at 09:13

## 2024-05-20 RX ADMIN — GUAIFENESIN AND DEXTROMETHORPHAN 10 ML: 100; 10 SYRUP ORAL at 00:39

## 2024-05-20 RX ADMIN — DIPHENHYDRAMINE HYDROCHLORIDE AND LIDOCAINE HYDROCHLORIDE AND ALUMINUM HYDROXIDE AND MAGNESIUM HYDRO 10 ML: KIT at 17:17

## 2024-05-20 RX ADMIN — CLOBAZAM 5 MG: 10 TABLET ORAL at 17:13

## 2024-05-20 RX ADMIN — PRAMIPEXOLE DIHYDROCHLORIDE 0.25 MG: 0.12 TABLET ORAL at 20:37

## 2024-05-20 RX ADMIN — IPRATROPIUM BROMIDE AND ALBUTEROL SULFATE 3 ML: 2.5; .5 SOLUTION RESPIRATORY (INHALATION) at 14:36

## 2024-05-20 RX ADMIN — PRAMIPEXOLE DIHYDROCHLORIDE 0.25 MG: 0.12 TABLET ORAL at 09:13

## 2024-05-20 RX ADMIN — GUAIFENESIN AND DEXTROMETHORPHAN 10 ML: 100; 10 SYRUP ORAL at 05:46

## 2024-05-20 RX ADMIN — PRAMIPEXOLE DIHYDROCHLORIDE 0.25 MG: 0.12 TABLET ORAL at 17:00

## 2024-05-20 RX ADMIN — LOSARTAN POTASSIUM 50 MG: 50 TABLET, FILM COATED ORAL at 09:13

## 2024-05-20 RX ADMIN — GUAIFENESIN 600 MG: 600 TABLET ORAL at 09:13

## 2024-05-20 RX ADMIN — DIPHENHYDRAMINE HYDROCHLORIDE AND LIDOCAINE HYDROCHLORIDE AND ALUMINUM HYDROXIDE AND MAGNESIUM HYDRO 10 ML: KIT at 11:16

## 2024-05-20 NOTE — PROGRESS NOTES
05/20/24 0900   Pain Assessment   Pain Assessment Tool 0-10   Pain Score No Pain  (no pain, however states discomfort with sore throat; RN aware)   Restrictions/Precautions   Precautions Bed/chair alarms;Cognitive;Fall Risk;Supervision on toilet/commode;Seizure;Multiple lines  (port for chemo R chest wall)   Weight Bearing Restrictions No   ROM Restrictions No   Sit to Stand   Type of Assistance Needed Incidental touching   Physical Assistance Level   (CGA)   Comment pt pulled on grab bar to stand with CGA   Sit to Stand CARE Score 4   Bed-Chair Transfer   Type of Assistance Needed Physical assistance   Physical Assistance Level 26%-50%   Comment SPT raised toilet to w/c using grab bar; min-modA   Chair/Bed-to-Chair Transfer CARE Score 3   Transfer Bed/Chair/Wheelchair   Limitations Noted In Balance;Endurance;UE Strength;LE Strength   Adaptive Equipment   (grab bar)   Toileting Hygiene   Type of Assistance Needed Physical assistance   Physical Assistance Level 26%-50%   Comment Pt reports using squatted standing method to complete hygiene prior to OT's approach during this toileting trial. OTS initiated pt completing wipe for thoroughness where pt leans while seated. Pt requried min-modA to pull shorts/undergarment up over L hip and buttocks while pt maintained balance while holding grab bar.   Toileting Hygiene CARE Score 3   Toileting   Able to Pull Clothing   (pt required asssit to pull pants up, pt recieved while on raised toilet.)   Manage Hygiene Bladder;Bowel   Limitations Noted In Balance;Problem Solving;ROM;Safety;UE Strength;LE Strength;Coordination   Adaptive Equipment Grab Bar   Toilet Transfer   Type of Assistance Needed Physical assistance   Physical Assistance Level 26%-50%   Comment SPT from raised toilet to w/c with grab bar, min-modA   Toilet Transfer CARE Score 3   Toilet Transfer   Surface Assessed Raised Toilet   Transfer Technique Stand Pivot   Limitations Noted In Balance;Endurance;Problem  Solving;ROM;Safety;UE Strength;LE Strength   Adaptive Equipment Grab Bar   Exercise Tools   Other Exercise Tool 1 card matching activity using LUE while holding card in RUE to work on reaching while seated in w/c; increased time to complete   Other Exercise Tool 2 yellow flexbar wrist sup/pron, wrist flex/ext, 30 reps each   Other Exercise Tool 3 wrist maze 2 mins each side   Coordination   Fine Motor pt placed/removed pegs and washers using bilat hands in/out of purdue pegboard   Cognition   Overall Cognitive Status Impaired   Arousal/Participation Responsive;Alert;Cooperative   Attention Attends with cues to redirect   Orientation Level Oriented X4   Memory Decreased short term memory;Decreased recall of precautions   Following Commands Follows one step commands without difficulty   Additional Activities   Additional Activities Other (Comment)   Additional Activities Comments pt completed w/c fxl mobility to and from OT room self propelling using RUE.   Other Comments   Assessment Pt particiapted with 92 minutes concurrent tx with Pt with similar goals with focus of overall strengthening and activity tolerence for use with ADL routines.   Assessment   Treatment Assessment Pt agreeable to skilled OT session this AM, focusing on UB strengthening, LUE ROM, and FMC. Pt recieved seated on raised toilet. Toilet transfer has improved to min-modA while using grab bar to maintain balance. Pt still struggles with moving LLE into position before and during transfers, however he is now able to recognize the positioning before beginning the transfer. Pt able to give more assistance to pull pants up when compared to previous sessions. Pt able to complete activity involving raising and reaching with LUE without Saebo MAS today, which shows much improvement with LUE AROM. Continued education on energy conservation and how taking breaks during activities/exercises is acceptable and does not mean anything negative. Pt requires  verbal cues to take rest breaks; he still cannot initiate them. Pt's barriers to d/c include decreased strength throughout but especially L side s/p previous CVA, decreased balance, impaired coordination L side, impaired safety awareness, problem solving, and attention, and decreased activity tolerance; all affect independence in self care and fxl transfers. Pt would benefit from continued skilled OT services in order to address listed barriers and prepare for safe d/c.   Prognosis Good   Problem List Decreased strength;Decreased range of motion;Decreased endurance;Impaired balance;Decreased coordination;Decreased cognition;Decreased safety awareness;Impaired judgement   Plan   Treatment/Interventions ADL retraining;Functional transfer training;Therapeutic exercise;Endurance training;Cognitive reorientation;Patient/family training;Equipment eval/education;Bed mobility;Compensatory technique education;Spoke to nursing;OT   Progress Progressing toward goals   OT Therapy Minutes   OT Time In 0900   OT Time Out 1032   OT Total Time (minutes) 92   OT Mode of treatment - Concurrent (minutes) 92   Therapy Time missed   Time missed? No

## 2024-05-20 NOTE — PROGRESS NOTES
PM&R PROGRESS NOTE:  Praas Pitts 68 y.o. male MRN: [id number]  Unit/Bed#: -02 Encounter: [id number]      Rehab Diagnosis: Impairment of mobility, safety, Activities of Daily Living (ADLs), and cognitive/communication skills due to Brain Dysfunction:  02.1  Non-Traumatic  Etiologic Diagnosis: New onset epilepsy/New onset seizure disorder in the setting new right frontal lobe vasogenic edema and radiation necrosis related to metastatic adenocarcinoma right frontal brain mass    HPI: Paras Pitts is a 68 y.o. 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  CT head showing right frontal lobe vasogenic edema with known underlying lesion.  MRI brain showing a right superior frontal mass resection and chemoradiation with unchanged linear enhancement along the surgical cavity compared to 3/26/2024 favoring radiation necrosis.  Patient seen by neurology.  Video EEG showing: Early in recording there was frequent or nearly continuous focal motor seizure and later there were two subclinical right fronto central electrographic seizures.  Patient placed on the following AED regimen: Keppra 2 g twice daily, Vimpat 200 mg twice daily, Onfi 5 mg twice daily.  Of note patient was significantly sedated with Ativan.   Patient to follow-up with epileptologist as an outpatient.  Patient was seen by radiation oncology regarding edema and  radiation necrosis and recommended to increase dexamethasone to 4 mg twice daily.  Patient is under the care of Dr. Langford for medical oncology and is to begin Avastin, chemotherapy radiation therapy for his recurrent mediastinal disease post rehabilitation.  After medical stabilization, patient found to have acute functional deficits from his mobility and self-care, therefore admitted to [facility] for acute inpatient rehabilitation.    SUBJECTIVE: Patient seen face to face.  Having an increased sore throat and mild SOB.  Cough still present as well. Progressing in rehabilitation program.  Fatigue significantly improved today.  Denies any fevers or chills.      ASSESSMENT: Stable, progressing      PLAN:    Rehabilitation  Functional deficits: Left hemiparesis, left inattention, impaired balance, impaired mobility, self care    Continue current rehabilitation plan of care to maximize function.    Estimated Discharge: Friday 5/24/24 with home care RN, PT, OT, SLP  Wife will be assisting patient at home    DVT prophylaxis  Continue Lovenox 40 mg SQ daily    Bladder plan  Continent    Bowel plan  Continent  +BM 5/20/24      * Seizure disorder (HCC)  Assessment & Plan  No evidence of seizures recently    New onset seizures presenting for 2024  CT head showing right frontal lobe vasogenic edema with known underlying lesion.    MRI brain showing a right superior frontal mass resection and chemoradiation with unchanged linear enhancement along the surgical cavity compared to 3/26/2024 favoring radiation necrosis.    Patient seen by neurology.    Video EEG showing: Early in recording there was frequent or nearly continuous focal motor seizure and later there were two subclinical right fronto central electrographic seizures.    Patient placed on the following AED regimen: Keppra 2 g twice daily, Vimpat 200 mg twice daily, Onfi 5 mg twice daily.  *Of note patient was significantly sedated with Ativan.  (Patient also on  Mirapex 0.25mg TID presumable for tremors or RLS)  Continue seizure precautions     Patient to follow-up with neurology-epileptologist as an outpatient.    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  Dexamethasone taking 4mg qday per prior providers recently for vasogenic edema (appears to be on 2mg BID prior at home based on chart review)   Patient is under the care of Dr. Langford for medical oncology and is to begin Avastin, chemotherapy radiation therapy for his recurrent mediastinal disease post rehabilitation.  Avastin Infusion 5/15/2024. Increased fatigue 5/17 which pt states is expected .  5/20 improved  Infusion center to set up chemo treatment   CBC, CMP, UA on Monday 5/13   Essentially unremarkable  Recent increased cough - comgmt per IM > improving  Covid/flu/RSV negative 5/13  CXR 5/14 - Right basilar linear opacity, likely atelectasis. Right upper lobectomy.  Encourage IS, flutter      Left hemiparesis (HCC)  Assessment & Plan  Stable   Worsening left hemiparesis likely related to recent radiation necrosis and vasogenic edema, seizures  Decadron for hx of vasogenic edema  Continue acute rehabilitation program to maximize function      Brain mass  Assessment & Plan  5/20 improved fatigue  5/17 neuroexam stable; increased fatigue today which is expected   Function improving some    Status post Avastin infusion on 5/15  - patient reports increased malaise and activity intolerance about  days after treatments typically that last 1 to 2 days - fall precautions, monitor closely    Patient with known metastatsis  to right frontal lobe s/p right frontal craniotomy in March 2023  H-O recommended increasing dex to 8mg BID x2 days and then will resume chronic Dexamethasone 4mg qday per Elaine Kay H-0  Has been on Dexamethasone taking 4mg qday per prior providers recently for vasogenic edema (appears to be on 2mg BID prior at home based on chart review)   Residual mild left hemiparesis but remained highly functional and independent.  Patient status post chemotherapy radiation SRT, immunotherapy  Follows with Dr. Langford    Pancytopenia (HCC)  Assessment & Plan  Post Avastin  WBC 2.31 K  Plt 101 K   Monitor     Cognitive impairment  Assessment & Plan  Cog stable 5/17; no recent safety concerns reported  Did have fall 5/11 when he tried to reach down to  urinal - MD young without signs of injury and patient continues to deny injury from fall  - High fall precautions with frequent rounding - if increased safety concerns consider q15 checks or 1:1  - MOCA completed 5/9 - obtain results   - Increased risk of delirium - monitor   - Patient/family/CG teaching   - Optimal Sleep/wake cycle management  - Limit sedating/deliriogenic meds when possible  - Optimal medical, mood, and pain management   - Skilled therapies as outlined   - Compensatory strategies   - Optimize oversight after discharge  - OP follow-up with PCP and neuro      Cough  Assessment & Plan  Cough and sore throat  IM checking for Strep  Added Duonebs, Magic Mouthwash     Comgmt with IM team  Monitor lung exam, vitals with and without activity  Encourage incentive spirometry/flutter valve   Pt afebrile no leukocytosis; saturating well on RA   Covid/Flu/RSV PCR negative 5/13  CXR 5/14 - Right basilar linear opacity, likely atelectasis. Right upper lobectomy.  Mucinex BID and Robitussin DM PRN per IM     Bilateral edema of lower extremity  Assessment & Plan  Stable; if worsens or becomes painful obtain BLE venous doppler   1+ Pitting edema bilaterally likely  "secondary to increased dependent position.    On Lovenox for DVT prophylaxis   TEDS daily  Elevate limbs more    Sore throat  Assessment & Plan  Improved; Covid/RSV/flu negative 5/13   PRN Robitussin ordered, lozenges, magic mouthwash  See section for cough    Adjustment disorder  Assessment & Plan  Mood acceptable   Supportive counseling  Consider neuropsychology consultation if needed     Pseudobulbar affect  Assessment & Plan  Mood is much improved without emotional lability noted (week of 5/6/2024)  On admission at times was tearful and emotionally labile  May benefit from a future SSRI if continues      Constipation  Assessment & Plan  Stooling adequately   Miralax qday  Declines supp  If needed add senna      History of CVA (cerebrovascular accident)  Assessment & Plan  Patient with ischemic CVA in 2011  Resultant mild left hemiparesis    Hypercholesterolemia  Assessment & Plan  Continue Lipitor 40 mg daily (home dose)    Essential hypertension  Assessment & Plan  Continue Norvasc 10 mg daily and losartan 50 mg daily (home dose)  Monitor BP and heart rate during rest and with activity  Internal medicine managing    Overweight (BMI 25.0-29.9)  Assessment & Plan  Dietary and lifestyle changes when able          Appreciate IM consultants medical co-management.  Labs, medications, and imaging personally reviewed.      ROS:  A ten point review of systems was completed on 05/20/24 and pertinent positives are listed in subjective section. All other systems reviewed were negative.       OBJECTIVE:   /84 (BP Location: Right arm)   Pulse 78   Temp 97.9 °F (36.6 °C) (Temporal)   Resp 16   Ht 5' 9.02\" (1.753 m)   Wt 95.4 kg (210 lb 5.1 oz)   SpO2 96%   BMI 31.04 kg/m²       Physical Exam  Vitals and nursing note reviewed.   Constitutional:       General: He is not in acute distress.  HENT:      Head: Normocephalic and atraumatic.      Nose: Nose normal.      Mouth/Throat:      Mouth: Mucous membranes are " moist.   Eyes:      Conjunctiva/sclera: Conjunctivae normal.   Cardiovascular:      Rate and Rhythm: Normal rate and regular rhythm.      Pulses: Normal pulses.   Pulmonary:      Effort: Pulmonary effort is normal.      Breath sounds: No wheezing or rales.      Comments: Coarse BS present  Abdominal:      General: Bowel sounds are normal. There is no distension.      Palpations: Abdomen is soft.      Tenderness: There is no abdominal tenderness.   Musculoskeletal:         General: Swelling (improved edema in legs) present.      Cervical back: Neck supple.   Skin:     General: Skin is warm.   Neurological:      Mental Status: He is alert and oriented to person, place, and time.      Motor: Weakness (left hemiparesis) present.      Comments: Improving exam:  LUE: 3+/5 SAB, SF, EE, EF, WE, FA, FF  LLE: 3-/5 HF, KE, KF, 1/5 DF   Psychiatric:         Mood and Affect: Mood normal.          Lab Results   Component Value Date    WBC 2.31 (L) 05/20/2024    HGB 11.5 (L) 05/20/2024    HCT 35.0 (L) 05/20/2024    MCV 97 05/20/2024     (L) 05/20/2024     Lab Results   Component Value Date    SODIUM 140 05/20/2024    K 3.9 05/20/2024     05/20/2024    CO2 32 05/20/2024    BUN 12 05/20/2024    CREATININE 0.63 05/20/2024    GLUC 143 (H) 05/20/2024    CALCIUM 8.5 05/20/2024     Lab Results   Component Value Date    INR 0.92 04/20/2024    INR 0.98 08/30/2023    INR 1.06 03/24/2023    PROTIME 12.6 04/20/2024    PROTIME 13.2 08/30/2023    PROTIME 14.0 03/24/2023           Current Facility-Administered Medications:     acetaminophen (TYLENOL) tablet 975 mg, 975 mg, Oral, Q8H PRN, Jenise Dawson PA-C    alteplase (CATHFLO) injection 2 mg, 2 mg, Intracatheter, Q1MIN PRN, Collin Langford MD    aluminum-magnesium hydroxide-simethicone (MAALOX) oral suspension 30 mL, 30 mL, Oral, Q6H PRN, Jenise Dawson PA-C    amLODIPine (NORVASC) tablet 10 mg, 10 mg, Oral, Daily, Jenise Dawson PA-C, 10 mg at 05/20/24  0913    atorvastatin (LIPITOR) tablet 40 mg, 40 mg, Oral, After Dinner, Jenise Dawson PA-C, 40 mg at 05/19/24 1707    bisacodyl (DULCOLAX) rectal suppository 10 mg, 10 mg, Rectal, Daily PRN, Omer Andrade MD    cloBAZam (ONFI) tablet 5 mg, 5 mg, Oral, BID, Jenise Dawson PA-C, 5 mg at 05/20/24 0913    dexamethasone (DECADRON) tablet 4 mg, 4 mg, Oral, Daily, Layton Yoder MD, 4 mg at 05/20/24 0913    dextromethorphan-guaiFENesin (ROBITUSSIN DM) oral syrup 10 mL, 10 mL, Oral, Q4H PRN, Corina Arita MD, 10 mL at 05/20/24 0546    diphenhydramine, lidocaine, Al/Mg hydroxide, simethicone (Magic Mouthwash) oral solution 10 mL, 10 mL, Swish & Swallow, Q4H PRN, Omer Andrade MD    docusate sodium (COLACE) capsule 100 mg, 100 mg, Oral, BID PRN, Corina Arita MD    enoxaparin (LOVENOX) subcutaneous injection 40 mg, 40 mg, Subcutaneous, Daily, Corina Arita MD, 40 mg at 05/20/24 0913    guaiFENesin (MUCINEX) 12 hr tablet 600 mg, 600 mg, Oral, Q12H LEN, Omer Andrade MD, 600 mg at 05/20/24 0913    ipratropium-albuterol (DUO-NEB) 0.5-2.5 mg/3 mL inhalation solution 3 mL, 3 mL, Nebulization, TID, Corina Arita MD    lacosamide (VIMPAT) tablet 200 mg, 200 mg, Oral, Q12H LEN, Jenise Dawson PA-C, 200 mg at 05/20/24 0913    levETIRAcetam (KEPPRA) tablet 2,000 mg, 2,000 mg, Oral, Q12H LEN, Jenise Dawson PA-C, 2,000 mg at 05/20/24 0913    losartan (COZAAR) tablet 50 mg, 50 mg, Oral, Daily, Jenise Dawson PA-C, 50 mg at 05/20/24 0913    ondansetron (ZOFRAN-ODT) dispersible tablet 4 mg, 4 mg, Oral, Q6H PRN, Jenise Dawson PA-C    pantoprazole (PROTONIX) EC tablet 40 mg, 40 mg, Oral, Early Morning, Jenise Dawson PA-C, 40 mg at 05/20/24 0546    polyethylene glycol (MIRALAX) packet 17 g, 17 g, Oral, Daily PRN, Corina Arita MD, 17 g at 05/14/24 0923    polyethylene glycol (MIRALAX) packet 17 g, 17 g, Oral, Daily, Layton Yoder MD,  17 g at 05/18/24 0808    pramipexole (MIRAPEX) tablet 0.25 mg, 0.25 mg, Oral, TID, Jenise Dawson PA-C, 0.25 mg at 05/20/24 0913    Past Medical History:   Diagnosis Date    Brain compression (HCC) 03/20/2023    Brain mass 03/20/2023    Cancer (HCC) 2001    Receint lung and brain lesions and prostate cancer in 2001    Cerebral edema (HCC) 03/20/2023    Hypertension     Prostate cancer (HCC) 2001    Rectal bleeding     Stroke (HCC)     2011         Patient Active Problem List    Diagnosis Date Noted    Seizure disorder (HCC) 05/02/2024    Metastatic adenocarcinoma (HCC) 2023    Left hemiparesis (HCC) 05/02/2024    Brain mass 03/20/2023    Pancytopenia (HCC) 05/20/2024    Cough 05/14/2024    Cognitive impairment 05/14/2024    Bilateral edema of lower extremity 05/10/2024    Sore throat 05/08/2024    Frontal mass of brain 05/02/2024    Pseudobulbar affect 05/02/2024    Adjustment disorder 05/02/2024    Constipation 04/25/2024    Seizure-like activity (HCC) 04/23/2024    Generalized weakness 04/20/2024    Tremors of nervous system 04/20/2024    History of CVA (cerebrovascular accident) 06/22/2023    Chemotherapy-induced neutropenia (HCC) 05/03/2023    Encounter for central line care 05/01/2023    Carcinoma of right lung (HCC) 04/13/2023    Lung nodule 03/21/2023    Hypercholesterolemia 09/20/2021    Essential hypertension 09/11/2020    Annual physical exam 09/11/2020    Negative depression screening 02/24/2020    Overweight (BMI 25.0-29.9) 02/24/2020          Corina Arita MD    I have spent a total time of 40 minutes on 05/20/24 in caring for this patient including Impressions, Documenting in the medical record, Reviewing / ordering tests, medicine, procedures  , and Communicating with other healthcare professionals .      ** Please Note:  voice to text software may have been used in the creation of this document. Although proof errors in transcription or interpretation are a potential of such  software**

## 2024-05-20 NOTE — PROGRESS NOTES
05/20/24 1400   Pain Assessment   Pain Assessment Tool 0-10   Pain Score No Pain   Comprehension   Comprehension (FIM) 6 - Has only MILD difficulty with complex/abstract info   Expression   Expression (FIM) 6 - Expresses complex/abstract but requires:  more time   Social Interaction   Social Interaction (FIM) 6 - Interacts appropriately with others BUT requires extra  time   Problem Solving   Problem solving (FIM) 5 - Solves basic problems 90% of time   Memory   Memory (FIM) 5 - Recalls/performs request 90% of time   Speech/Language/Cognition Assessmetn   Treatment Assessment background music on for session for distraction with preferred music. seek out words with elimination @ 90% dasia. Alternate naming 2 categories with 2 errors increase to 3 categories no errors completed in 4:10. Rush hour level 6 set up dasia and solved in 3:46 and rush hour level 7 set up dasia and solved in 3:13.   SLP Therapy Minutes   SLP Time In 1330   SLP Time Out 1440   SLP Total Time (minutes) 70   SLP Mode of treatment - Individual (minutes) 70   SLP Mode of treatment - Concurrent (minutes) 0   SLP Mode of treatment - Group (minutes) 0   SLP Mode of treatment - Co-treat (minutes) 0   SLP Mode of Treatment - Total time(minutes) 70 minutes   SLP Cumulative Minutes 850

## 2024-05-20 NOTE — PLAN OF CARE
Problem: PAIN - ADULT  Goal: Verbalizes/displays adequate comfort level or baseline comfort level  Description: Interventions:  - Encourage patient to monitor pain and request assistance  - Assess pain using appropriate pain scale  - Administer analgesics based on type and severity of pain and evaluate response  - Implement non-pharmacological measures as appropriate and evaluate response  - Consider cultural and social influences on pain and pain management  - Notify physician/advanced practitioner if interventions unsuccessful or patient reports new pain  Outcome: Progressing     Problem: Nutrition/Hydration-ADULT  Goal: Nutrient/Hydration intake appropriate for improving, restoring or maintaining nutritional needs  Description: Monitor and assess patient's nutrition/hydration status for malnutrition. Collaborate with interdisciplinary team and initiate plan and interventions as ordered.  Monitor patient's weight and dietary intake as ordered or per policy. Utilize nutrition screening tool and intervene as necessary. Determine patient's food preferences and provide high-protein, high-caloric foods as appropriate.     INTERVENTIONS:  - Monitor oral intake, urinary output, labs, and treatment plans  - Assess nutrition and hydration status and recommend course of action  - Evaluate amount of meals eaten  - Assist patient with eating if necessary   - Allow adequate time for meals  - Recommend/ encourage appropriate diets, oral nutritional supplements, and vitamin/mineral supplements  - Order, calculate, and assess calorie counts as needed  - Recommend, monitor, and adjust tube feedings and TPN/PPN based on assessed needs  - Assess need for intravenous fluids  - Provide specific nutrition/hydration education as appropriate  - Include patient/family/caregiver in decisions related to nutrition  Outcome: Progressing     Problem: SAFETY ADULT  Goal: Patient will remain free of falls  Description: INTERVENTIONS:  -  Educate patient/family on patient safety including physical limitations  - Instruct patient to call for assistance with activity   - Consult OT/PT to assist with strengthening/mobility   - Keep Call bell within reach  - Keep bed low and locked with side rails adjusted as appropriate  - Keep care items and personal belongings within reach  - Initiate and maintain comfort rounds  - Make Fall Risk Sign visible to staff  - Apply yellow socks and bracelet for high fall risk patients  - Consider moving patient to room near nurses station  Outcome: Progressing

## 2024-05-20 NOTE — PROGRESS NOTES
05/20/24 1130   Pain Assessment   Pain Assessment Tool 0-10   Pain Score No Pain   Restrictions/Precautions   Precautions Bed/chair alarms;Cognitive;Fall Risk;Seizure;Supervision on toilet/commode  (Chemo port R chest wall)   Weight Bearing Restrictions No   ROM Restrictions No   Cognition   Overall Cognitive Status Impaired   Subjective   Subjective Reports doing well this weekend, a little fatigued,but okay today.  Does need to use the bathroom.   Sit to Stand   Type of Assistance Needed Physical assistance   Physical Assistance Level 25% or less   Comment pull to stand with increased time, cues for foot placement   Sit to Stand CARE Score 3   Bed-Chair Transfer   Type of Assistance Needed Physical assistance   Physical Assistance Level 26%-50%   Comment min-mod, cues for hand/foot placement, positioning of left LE   Chair/Bed-to-Chair Transfer CARE Score 3   Wheel 50 Feet with Two Turns   Type of Assistance Needed Supervision   Physical Assistance Level No physical assistance   Comment Increased time, wheezing, pt needing breathing treatment and nurse provides with mouthwash for sore throat   Wheel 50 Feet with Two Turns CARE Score 4   Wheelchair mobility   Does the patient use a wheelchair? 1. Yes   Type of Wheelchair Used 1. Manual   Method Right upper extremity;Left lower extremity   Distance Level Surface (feet) 50 ft  (x 2 trials)   Toilet Transfer   Type of Assistance Needed Physical assistance   Physical Assistance Level 26%-50%   Comment SPT, grab bar   Toilet Transfer CARE Score 3   Therapeutic Interventions   Strengthening Seated LE TE, AAROM with MCs on left LE   Assessment   Treatment Assessment Pt more with more wheezing this date, more frequent bouts of coughing.  Overall making progress with left quad control and left LE isolation.  Improves with deep tendon tapping.  He will benefit from ongoing interventions to maximize return to PLOF.   Problem List Decreased strength;Decreased range of  motion;Decreased endurance;Impaired balance;Decreased coordination;Decreased cognition;Decreased safety awareness;Impaired judgement   PT Barriers   Physical Impairment Decreased strength;Decreased range of motion;Decreased endurance;Impaired balance;Decreased mobility;Decreased coordination;Decreased safety awareness;Impaired tone   Functional Limitation Wheelchair management;Walking;Transfers;Standing;Stair negotiation;Ramp negotiation;Car transfers   Plan   Treatment/Interventions ADL retraining;Functional transfer training;LE strengthening/ROM;Elevations;Therapeutic exercise;Endurance training;Cognitive reorientation;Patient/family training;Equipment eval/education;Bed mobility;Gait training   Progress Progressing toward goals   PT Therapy Minutes   PT Time In 1130   PT Time Out 1200   PT Total Time (minutes) 30   PT Mode of treatment - Individual (minutes) 30   PT Mode of treatment - Concurrent (minutes) 0   PT Mode of treatment - Group (minutes) 0   PT Mode of treatment - Co-treat (minutes) 0   PT Mode of Treatment - Total time(minutes) 30 minutes   PT Cumulative Minutes 1383       Patient remains OOB in chair, all needs in reach.  Alarm in place and activated.  Encouraged use of call bell, patient verbalizes understanding.

## 2024-05-20 NOTE — PROGRESS NOTES
"Progress Note - Paras Pitts 68 y.o. male MRN: [id number]    Unit/Bed#: [facility] 213-02 Encounter: [id number]        Subjective:   Patient seen and examined at bedside after reviewing the chart and discussing the case with the caring staff.      Patient examined at bedside.  Patient reports that he has throat area pain since he was a started on this new chemotherapeutic agent.  Patient denied any fever.    I reviewed the labs from 5/20/2024.  Patient's CMP showed glucose 143.  Patient's CBC showed hemoglobin 11.5 with hematocrit 35.0.    Physical Exam   Vitals: Blood pressure 137/84, pulse 78, temperature 97.9 °F (36.6 °C), temperature source Temporal, resp. rate 16, height 5' 9.02\" (1.753 m), weight 95.4 kg (210 lb 5.1 oz), SpO2 96%.,Body mass index is 31.04 kg/m².  Constitutional: Patient in no acute distress.  HEENT: PERR, EOMI, MMM.  Mild/moderate erythema involving the pharynx  Cardiovascular: Normal rate and regular rhythm.    Pulmonary/Chest: Normal effort and breath sounds normal.  Abdomen: Soft, + BS, NT.    Assessment/Plan:  Paras Pitts is a(n) 68 y.o. male with tremors of the nervous system.     Cardiac Hx w/ HTN, HLD, hx of CVA.  Continue amlodipine 10 mg daily, Losartan 50 mg daily, atorvastatin 40 mg daily.  Type 2 diabetes mellitus.  Hgb A1c 6.7% on 5/3/24.  Carb controlled diet.   GERD.  Continue Protonix 40 mg daily.  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  Stage [place].  S/p robotic converted to right thoracotomy and right upper lobectomy on 9/1/2023. S/p bronchoscopy with EBUS at [facility] on 4/16/2024.  Continue Decadron 4 mg daily.  Patient completed CT-SIM at [facility] 4/29.  Received Avastin infusion 5/15.  Pain and bowel regimen. As per physiatry.  Acute pharyngitis.  I will get throat culture to rule out strep.  Patient may use Magic mouthwash every 4 hours as needed.  Patient may also get lozenges.  Cough/congestion.  Started Mucinex twice daily along with Robitussin as needed. "  COVID/RSV/flu test 5/13 found to be negative.  CXR was negative for pneumonia.  Tremors of the nervous system.  Continue Keppra 2000 mg twice daily, Vimpat 200 mg twice daily, clobazam 5 mg twice daily.  Follow up in 4 weeks with epilepsy attending.  Patient receiving intensive PT OT as per physiatry.     Anticipated date of discharge.  Wednesday, 5/22/2024

## 2024-05-20 NOTE — NURSING NOTE
Patient complaining of sore throat in AM and noted to be red in back of throat and side of left tongue. Dr. Andrade made aware of same. Mouth wash, nebs, and throat culture ordered. Mouth wash given twice and cough drops at bedside. Continues with harsh non productive cough. Reports no pain. Left side remains weak. Able to transfer assist x 1-2 depending on fatigue. Continued stroke education provided. Remains continant. Sitting in recliner for all meals. Will continue to monitor and follow plan of care.

## 2024-05-21 PROBLEM — R06.02 SHORTNESS OF BREATH: Status: ACTIVE | Noted: 2024-05-21

## 2024-05-21 LAB
DME PARACHUTE DELIVERY DATE REQUESTED: NORMAL
DME PARACHUTE DELIVERY DATE REQUESTED: NORMAL
DME PARACHUTE DELIVERY NOTE: NORMAL
DME PARACHUTE DELIVERY NOTE: NORMAL
DME PARACHUTE ITEM DESCRIPTION: NORMAL
DME PARACHUTE ORDER STATUS: NORMAL
DME PARACHUTE ORDER STATUS: NORMAL
DME PARACHUTE SUPPLIER NAME: NORMAL
DME PARACHUTE SUPPLIER NAME: NORMAL
DME PARACHUTE SUPPLIER PHONE: NORMAL
DME PARACHUTE SUPPLIER PHONE: NORMAL

## 2024-05-21 PROCEDURE — 97116 GAIT TRAINING THERAPY: CPT

## 2024-05-21 PROCEDURE — 99233 SBSQ HOSP IP/OBS HIGH 50: CPT | Performed by: PHYSICAL MEDICINE & REHABILITATION

## 2024-05-21 PROCEDURE — 97542 WHEELCHAIR MNGMENT TRAINING: CPT

## 2024-05-21 PROCEDURE — 97110 THERAPEUTIC EXERCISES: CPT

## 2024-05-21 PROCEDURE — 97530 THERAPEUTIC ACTIVITIES: CPT

## 2024-05-21 PROCEDURE — 92507 TX SP LANG VOICE COMM INDIV: CPT | Performed by: NURSE PRACTITIONER

## 2024-05-21 PROCEDURE — 97535 SELF CARE MNGMENT TRAINING: CPT

## 2024-05-21 RX ORDER — ALBUTEROL SULFATE 90 UG/1
2 AEROSOL, METERED RESPIRATORY (INHALATION) EVERY 4 HOURS PRN
Status: DISCONTINUED | OUTPATIENT
Start: 2024-05-21 | End: 2024-05-24 | Stop reason: HOSPADM

## 2024-05-21 RX ADMIN — IPRATROPIUM BROMIDE AND ALBUTEROL SULFATE 3 ML: 2.5; .5 SOLUTION RESPIRATORY (INHALATION) at 17:31

## 2024-05-21 RX ADMIN — DEXAMETHASONE 4 MG: 4 TABLET ORAL at 08:31

## 2024-05-21 RX ADMIN — PRAMIPEXOLE DIHYDROCHLORIDE 0.25 MG: 0.12 TABLET ORAL at 21:59

## 2024-05-21 RX ADMIN — ACETAMINOPHEN 975 MG: 325 TABLET ORAL at 05:21

## 2024-05-21 RX ADMIN — CLOBAZAM 5 MG: 10 TABLET ORAL at 08:32

## 2024-05-21 RX ADMIN — LACOSAMIDE 200 MG: 150 TABLET ORAL at 21:59

## 2024-05-21 RX ADMIN — GUAIFENESIN 600 MG: 600 TABLET ORAL at 08:32

## 2024-05-21 RX ADMIN — AMLODIPINE BESYLATE 10 MG: 10 TABLET ORAL at 08:31

## 2024-05-21 RX ADMIN — LEVETIRACETAM 2000 MG: 500 TABLET, FILM COATED ORAL at 07:35

## 2024-05-21 RX ADMIN — PRAMIPEXOLE DIHYDROCHLORIDE 0.25 MG: 0.12 TABLET ORAL at 17:31

## 2024-05-21 RX ADMIN — LEVETIRACETAM 2000 MG: 500 TABLET, FILM COATED ORAL at 20:59

## 2024-05-21 RX ADMIN — PRAMIPEXOLE DIHYDROCHLORIDE 0.25 MG: 0.12 TABLET ORAL at 08:32

## 2024-05-21 RX ADMIN — ATORVASTATIN CALCIUM 40 MG: 40 TABLET, FILM COATED ORAL at 17:31

## 2024-05-21 RX ADMIN — IPRATROPIUM BROMIDE AND ALBUTEROL SULFATE 3 ML: 2.5; .5 SOLUTION RESPIRATORY (INHALATION) at 21:59

## 2024-05-21 RX ADMIN — ENOXAPARIN SODIUM 40 MG: 40 INJECTION SUBCUTANEOUS at 08:30

## 2024-05-21 RX ADMIN — GUAIFENESIN 600 MG: 600 TABLET ORAL at 21:59

## 2024-05-21 RX ADMIN — IPRATROPIUM BROMIDE AND ALBUTEROL SULFATE 3 ML: 2.5; .5 SOLUTION RESPIRATORY (INHALATION) at 08:38

## 2024-05-21 RX ADMIN — LACOSAMIDE 200 MG: 150 TABLET ORAL at 08:31

## 2024-05-21 RX ADMIN — LOSARTAN POTASSIUM 50 MG: 50 TABLET, FILM COATED ORAL at 08:32

## 2024-05-21 RX ADMIN — CLOBAZAM 5 MG: 10 TABLET ORAL at 17:31

## 2024-05-21 RX ADMIN — PANTOPRAZOLE SODIUM 40 MG: 40 TABLET, DELAYED RELEASE ORAL at 05:19

## 2024-05-21 NOTE — QUICK NOTE
RN reports patient was acutely SOB last night which was very concerning to him.  Felt better post nebulizer treatment, however intermittently still OSB.      Discussed with primary service this morning    CT Chest PE protocol to be done this AM to rule out PE given high risk.     Insert IV.       Corina Arita MD  PM&R

## 2024-05-21 NOTE — PROGRESS NOTES
05/21/24 0989   Pain Assessment   Pain Assessment Tool 0-10   Pain Score No Pain   Restrictions/Precautions   Precautions Bed/chair alarms;Cognitive;Fall Risk;Multiple lines;Supervision on toilet/commode;Seizure  (port R chest wall for chemo)   Weight Bearing Restrictions No   ROM Restrictions No   Grooming   Able To Comb/Brush Hair;Wash/Dry Face;Wash/Dry Hands;Brush/Clean Teeth;Acquire Items;Initiate Tasks   Limitation Noted In Coordination;Problem Solving;Safety;Strength   Shower/Bathe Self   Type of Assistance Needed Physical assistance;Incidental touching   Physical Assistance Level 25% or less   Comment pt able to wash/dry all body parts with the exception of L buttock; OTS provided CGA to maintain balance whiel standing at sink.   Shower/Bathe Self CARE Score 3   Bathing   Assessed Bath Style Sponge Bath   Anticipated D/C Bath Style Shower;Sponge Bath   Able to Gather/Transport No   Able to Wash/Rinse/Dry (body part) Left Arm;Right Arm;L Upper Leg;R Upper Leg;L Lower Leg/Foot;R Lower Leg/Foot;Chest;Perineal Area;Abdomen  (R buttock)   Limitations Noted in Balance;Coordination;Endurance;Problem Solving;ROM;Safety;Strength   Positioning Seated;Standing   Tub/Shower Transfer   Reason Not Assessed Sponge Bath   Upper Body Dressing   Type of Assistance Needed Physical assistance   Physical Assistance Level 25% or less   Comment Alek to unroll back of shirt while donning   Upper Body Dressing CARE Score 3   Lower Body Dressing   Type of Assistance Needed Physical assistance   Physical Assistance Level 26%-50%   Comment pt reports donning new undergarment/shorts before arrival of OTS; required min-modA to pull shorts/undergarment over L hip/buttocks while maintaining balance at sink   Lower Body Dressing CARE Score 3   Putting On/Taking Off Footwear   Type of Assistance Needed Physical assistance   Physical Assistance Level 25% or less   Comment pt able to don/doff socks while seated in w/c; assist to don TEDs  "  Putting On/Taking Off Footwear CARE Score 3   Picking Up Object   Reason if not Attempted Safety concerns   Picking Up Object CARE Score 88   Dressing/Undressing Clothing   Remove UB Clothes Pullover Shirt   Don UB Clothes Pullover Shirt   Remove LB Clothes Socks   Don LB Clothes Socks;TEDs   Limitations Noted In Balance;Coordination;Endurance;Problem Solving;Safety;Strength;ROM   Positioning Supported Sit;Standing   Sit to Stand   Type of Assistance Needed Incidental touching   Physical Assistance Level   (CGA)   Sit to Stand CARE Score 4   Coordination   Fine Motor pt tied/untied knots with black rubber tube; increaed time required   Cognition   Overall Cognitive Status Impaired   Arousal/Participation Alert;Responsive;Cooperative   Attention Attends with cues to redirect   Orientation Level Oriented X4   Memory Decreased recall of precautions;Decreased short term memory   Following Commands Follows one step commands with increased time or repetition   Additional Activities   Additional Activities Other (Comment)   Additional Activities Comments w/c fxl mobility around room at independent level   Activity Tolerance   Activity Tolerance Patient limited by fatigue;Patient tolerated treatment well   Other Comments   Assessment Pt particiapted with 30 minutes concurrent tx with Pt with similar goals with focus of overall strengthening and activity tolerence for use with ADL routines.   Assessment   Treatment Assessment Pt agreeable to skilled OT session this AM, focusing on ADL training, fxl mobility, and fxl transfers; details listed in respective sections. Pt recieved seated in w/c.  Pt noted to have increased fatigue and SOB/wheezing; SPO2 91% during session. Pt required increased time/assistance during ADLs d/t overall decreaed activity tolerance. Pt reports \"they really worked me out yesterday\" when asked how he was feeling during session. Pt's barriers to d/c include decreased strength throughout but " especially L side s/p previous CVA, decreased balance, impaired coordination L side, impaired safety awareness, problem solving, and attention, and decreased activity tolerance; all affect independence in self care and fxl transfers. Pt would benefit from continued skilled OT services in order to address listed barriers and prepare for safe d/c.   Prognosis Good   Problem List Decreased range of motion;Decreased strength;Decreased endurance;Impaired balance;Decreased coordination;Decreased cognition;Impaired judgement;Decreased safety awareness   Plan   Treatment/Interventions ADL retraining;Functional transfer training;Therapeutic exercise;Cognitive reorientation;Endurance training;Patient/family training;Equipment eval/education;Bed mobility;Compensatory technique education;OT   Progress Slow progress, decreased activity tolerance   OT Therapy Minutes   OT Time In 0930   OT Time Out 1030   OT Total Time (minutes) 60   OT Mode of treatment - Individual (minutes) 30   OT Mode of treatment - Concurrent (minutes) 30   Therapy Time missed   Time missed? No

## 2024-05-21 NOTE — PROGRESS NOTES
05/20/24 1230   Pain Assessment   Pain Assessment Tool 0-10   Pain Score No Pain   Restrictions/Precautions   Precautions Bed/chair alarms;Cognitive;Fall Risk;Seizure;Supervision on toilet/commode  (Chemo port R chest wall)   Weight Bearing Restrictions No   ROM Restrictions No   Cognition   Overall Cognitive Status Impaired   Subjective   Subjective Patient reports a little fatigued today.   Sit to Stand   Type of Assistance Needed Physical assistance   Physical Assistance Level 25% or less   Comment supervision to min A push to stand and pull to stand   Sit to Stand CARE Score 3   Bed-Chair Transfer   Type of Assistance Needed Physical assistance   Physical Assistance Level 26%-50%   Comment min-mod A.  Min A SPT and mod A with RW   Chair/Bed-to-Chair Transfer CARE Score 3   Car Transfer   Reason if not Attempted Environmental limitations   Car Transfer CARE Score 10   Wheel 50 Feet with Two Turns   Type of Assistance Needed Supervision   Physical Assistance Level No physical assistance   Wheel 50 Feet with Two Turns CARE Score 4   Wheel 150 Feet   Type of Assistance Needed Supervision   Physical Assistance Level No physical assistance   Comment Trial UE and alt LE propulsion to encourage left LE activation of quads and HS   Wheel 150 Feet CARE Score 4   Wheelchair mobility   Does the patient use a wheelchair? 1. Yes   Type of Wheelchair Used 1. Manual   Method Right upper extremity;Left lower extremity   Curb or Single Stair   Comment Performed step tap and forced use in standing wiht B/L UE support   4 Steps   Reason if not Attempted Safety concerns   4 Steps CARE Score 88   12 Steps   Reason if not Attempted Safety concerns   12 Steps CARE Score 88   Stairs   Type Stairs   Picking Up Object   Type of Assistance Needed Physical assistance   Physical Assistance Level 76% or more   Comment max A, attempted for picking pants up from floor   Picking Up Object CARE Score 2   Toilet Transfer   Type of Assistance  Needed Physical assistance   Physical Assistance Level 26%-50%   Comment SPT, grab bar   Toilet Transfer CARE Score 3   Therapeutic Interventions   Strengthening Seated LE TE, standing TE   Balance Side stepping rail in hallway with focus on weight shift and full knee extension in standing wiht WBing on R then left.   Neuromuscular Re-Education Deep tendon tapping for left LE activation.  Forced use in standing with left LE WBing, right LE tap up   Assessment   Treatment Assessment Pt presents willing to complete PT, motivated to improve.  Fatigues easily with increased audible wheezing noted throughout session.  Demonstrates improved weight shift, fatigues easily in standing.  Remains at increased fall risk and is in need of ongoing interventions.  Cont per POC.   Problem List Decreased strength;Decreased range of motion;Decreased endurance;Impaired balance;Decreased coordination;Decreased cognition;Decreased safety awareness;Impaired judgement   PT Barriers   Physical Impairment Decreased strength;Decreased range of motion;Decreased endurance;Impaired balance;Decreased mobility;Decreased coordination;Decreased safety awareness;Impaired tone   Functional Limitation Wheelchair management;Walking;Transfers;Standing;Stair negotiation;Ramp negotiation;Car transfers   Plan   Treatment/Interventions Functional transfer training;LE strengthening/ROM;Elevations;Therapeutic exercise;Endurance training;Cognitive reorientation;Patient/family training;Equipment eval/education;Bed mobility;Gait training   Progress Progressing toward goals   PT Therapy Minutes   PT Time In 1230   PT Time Out 1330   PT Total Time (minutes) 60   PT Mode of treatment - Individual (minutes) 60   PT Mode of treatment - Concurrent (minutes) 0   PT Mode of treatment - Group (minutes) 0   PT Mode of treatment - Co-treat (minutes) 0   PT Mode of Treatment - Total time(minutes) 60 minutes   PT Cumulative Minutes 1443       Patient remains OOB in chair,  all needs in reach.  Alarm in place and activated. Transitioned to ST session.

## 2024-05-21 NOTE — NURSING NOTE
Assist of 1 mod assist with transfers this shift.  See rehab care scores.  Seen by Dr. Andrade earlier and verified pts  refusal for CTA ordered.  No further orders.  Wife visiting at this time.  In no resp distress.  Tolerating resp tx's.  Continue same plan of care along with continuing education.

## 2024-05-21 NOTE — PROGRESS NOTES
05/21/24 1100   Pain Assessment   Pain Assessment Tool 0-10   Pain Score No Pain   Restrictions/Precautions   Precautions Bed/chair alarms;Cognitive;Fall Risk;Seizure;Supervision on toilet/commode  (port R chest wall for chemo)   Braces or Orthoses   (off shelf AFO LLE)   Cognition   Overall Cognitive Status Impaired   Arousal/Participation Alert;Responsive;Cooperative   Attention Attends with cues to redirect   Orientation Level Oriented X4   Memory Decreased short term memory;Decreased recall of precautions   Following Commands Follows one step commands with increased time or repetition   Sit to Stand   Type of Assistance Needed Physical assistance;Verbal cues   Physical Assistance Level 26%-50%   Comment mod A STS with PFRW   Sit to Stand CARE Score 3   Walk 10 Feet   Type of Assistance Needed Physical assistance;Verbal cues   Physical Assistance Level Total assistance   Comment min-mod A level and unlevel surfaces with PFRW; second person for wheelchair follow   Walk 10 Feet CARE Score 1   Walking 10 Feet on Uneven Surfaces   Type of Assistance Needed Physical assistance;Verbal cues   Physical Assistance Level Total assistance   Comment min-mod A level and unlevel surfaces with PFRW; second person for wheelchair follow   Walking 10 Feet on Uneven Surfaces CARE Score 1   Ambulation   Primary Mode of Locomotion Prior to Admission Walk   Distance Walked (feet) 48 ft  (48' 40' 41')   Assist Device Platform;Roller Walker   Gait Pattern Inconsistant Chioma;Slow Chioma;Decreased foot clearance;L foot drag;L hemiparesis;Narrow ABHINAV;Step to;Decreased L stance;Improper weight shift   Limitations Noted In Balance;Coordination;Device Management;Endurance;Heel Strike;Posture;Safety;Sequencing;Strength   Provided Assistance with: Balance;Limb Advancement;Weight Shift   Walk Assist Level Minimum Assist;Moderate Assist;Chair Follow   Findings min-mod A level and unlevel surfaces with PFRW; second person for wheelchair  follow   Does the patient walk? 2. Yes   Wheel 50 Feet with Two Turns   Type of Assistance Needed Supervision;Verbal cues   Comment S/mod I level and unlevel surfaces; increased time to complete due to decreased endurance   Wheel 50 Feet with Two Turns CARE Score 4   Wheel 150 Feet   Type of Assistance Needed Supervision;Verbal cues   Comment S/mod I level and unlevel surfaces; increased time to complete due to decreased endurance   Wheel 150 Feet CARE Score 4   Wheelchair mobility   Does the patient use a wheelchair? 1. Yes   Type of Wheelchair Used 1. Manual   Method Right upper extremity;Right lower extremity   Assistance Provided For Remove Leg Rest;Replace Leg Rest;Remove armrests;Replace armrests   Distance Level Surface (feet) 201 ft  (201' 137')   Distance Wheeled 3% Grade 12 ft   Findings S/mod I level and unlevel surfaces; increased time to complete due to decreased endurance   Therapeutic Interventions   Strengthening standing ther ex   Balance gait and transfer training   Other wheelchair mobility   Assessment   Treatment Assessment Patient agreeable to therapy session.   No pain reported.  Reports he is breathing better since he got the breathing treatments.   Occasional cues for general safety.   Continued gait training with  L off shelf AFO today.   Patient adapting well with use of brace for gait training.   Completed ther ex for general LE strengthening as well as improved LLE control; gait and transfer training focusing on sequence and technique for improved balance and safety with functional mobility using walker.  Propels wheelchair S/MOD I over level and unlevel surfaces.  Requires increased time to complete due to decreased endurance.   PT Barriers   Physical Impairment Decreased strength;Decreased range of motion;Decreased endurance;Impaired balance;Decreased mobility;Decreased coordination;Decreased safety awareness;Impaired tone   Functional Limitation Wheelchair  management;Walking;Transfers;Standing;Stair negotiation;Car transfers;Ramp negotiation   Plan   Treatment/Interventions Functional transfer training;LE strengthening/ROM;Therapeutic exercise;Gait training   Progress Progressing toward goals   PT Therapy Minutes   PT Time In 1100   PT Time Out 1200   PT Total Time (minutes) 60   PT Mode of treatment - Individual (minutes) 60   PT Mode of treatment - Concurrent (minutes) 0   PT Mode of treatment - Group (minutes) 0   PT Mode of treatment - Co-treat (minutes) 0   PT Mode of Treatment - Total time(minutes) 60 minutes   PT Cumulative Minutes 1503   Therapy Time missed   Time missed? No

## 2024-05-21 NOTE — ASSESSMENT & PLAN NOTE
Transient Shortness of breath starting 5/20/2024.  Improved 5/21/24 & 5/22/24  Patient recommended CT chest with PE protocol to rule out PE,  however patient is currently refusing testing today, despite both myself and Dr. Andrade educating.  Monitor saturations closely  Continue Karine  Monitor shortness of breath -patient may be agreeable if symptoms continue

## 2024-05-21 NOTE — NURSING NOTE
"Team made aware of my pt assessment where pt states he got really scared last night stating he couldn't breathe for a few seconds while taking a  deep breath during resp tx while lying on cardiac chair.  States after he sat up he felt better.  Stat CTA ordered and explained ,  pt refusing.  States \"I don't need that\".  Jenise MENDOZA who is present on [facility] made aware.    "

## 2024-05-21 NOTE — PROGRESS NOTES
05/21/24 1230   Pain Assessment   Pain Assessment Tool 0-10   Pain Score No Pain   Restrictions/Precautions   Precautions Bed/chair alarms;Cognitive;Fall Risk;Seizure;Supervision on toilet/commode  (port R chest wall for chemo)   Braces or Orthoses   (off shelf AFO LLE)   Cognition   Overall Cognitive Status Impaired   Arousal/Participation Alert;Responsive;Cooperative   Attention Attends with cues to redirect   Orientation Level Oriented X4   Memory Decreased short term memory;Decreased recall of precautions   Following Commands Follows one step commands with increased time or repetition   Sit to Lying   Type of Assistance Needed Physical assistance;Verbal cues   Physical Assistance Level 51%-75%   Comment mod-max A mat table; increased fatigue noted   Sit to Lying CARE Score 2   Lying to Sitting on Side of Bed   Type of Assistance Needed Physical assistance;Verbal cues   Physical Assistance Level 51%-75%   Comment mod-max A mat table; increased fatigue noted   Lying to Sitting on Side of Bed CARE Score 2   Sit to Stand   Type of Assistance Needed Physical assistance;Verbal cues   Physical Assistance Level 51%-75%   Comment mod-max A STS with PFRW   Sit to Stand CARE Score 2   Bed-Chair Transfer   Type of Assistance Needed Physical assistance;Verbal cues   Physical Assistance Level 51%-75%   Comment mod A sit pivot wheelchair<>mat table using no AD; MAX A SPT with PFRW; increased fatigue; pt's wife in for family training; strategies given for where to stand to maximize pt and caregiver safety when transferring;   recommend that they use sit pivot approach with increased fatigue.   Chair/Bed-to-Chair Transfer CARE Score 2   Walk 10 Feet   Type of Assistance Needed Physical assistance;Verbal cues   Physical Assistance Level Total assistance   Comment mod A level surfaces with PFRW and second person for wheelchair follow; reviewed gait training with pt's wife. Recommend using wheelchair as primary mode of  locomotion at d/c until [facility] PT evaluates patient in his home environment; demonstrated how to put on/take off AFO and reviewed need to inspect skin after use to check for breakdown.   Walk 10 Feet CARE Score 1   Ambulation   Primary Mode of Locomotion Prior to Admission Walk   Distance Walked (feet) 30 ft   Assist Device Platform;Roller Walker   Gait Pattern Inconsistant Chioma;Slow Chioma;Decreased foot clearance;L foot drag;L hemiparesis;Narrow ABHINAV;Step to;Decreased L stance;Improper weight shift   Limitations Noted In Balance;Coordination;Device Management;Endurance;Heel Strike;Posture;Safety;Sequencing;Strength   Provided Assistance with: Balance;Limb Advancement;Weight Shift   Walk Assist Level Moderate Assist;Chair Follow   Findings mod A level surfaces with PFRW and second person for wheelchair follow; reviewed gait training with pt's wife.   Recommend using wheelchair as primary mode of locomotion at d/c until [facility] PT evaluates patient in his home environment; demonstrated how to put on/take off AFO and reviewed need to inspect skin after use to check for breakdown.   Does the patient walk? 2. Yes   Therapeutic Interventions   Strengthening seated ther ex; AAROM LLE AROM RLE   Balance gait and transfer training   Assessment   Treatment Assessment Patient agreeable to therapy session.   Remains pain free, however, increased fatigue noted t/o therapy session, requiring increased assistance and frequent rest breaks.  Pt's wife attended last 30 minutes of therapy session.  Reviewed gait and transfer training.  Strategies given for where to stand to assist patient to maximize pt and caregiver safety.  Recommend using sit pivot method with increased fatigue.   Also recommend using wheelchair as primary mode of locomotion at discharge until [facility] PT assesses gait in the his home environment.  Reviewed how to don/doff AFO brace and to check skin after use to prevent breakdown.  Pt's wife scheduled to come in again  tomorrow to practice more transfers.   PT Barriers   Physical Impairment Decreased strength;Decreased range of motion;Decreased endurance;Impaired balance;Decreased mobility;Decreased coordination;Decreased safety awareness;Impaired tone   Functional Limitation Wheelchair management;Walking;Transfers;Standing;Stair negotiation;Car transfers;Ramp negotiation   Plan   Treatment/Interventions Functional transfer training;LE strengthening/ROM;Therapeutic exercise;Bed mobility;Gait training   Progress Slow progress, decreased activity tolerance   PT Therapy Minutes   PT Time In 1230   PT Time Out 1345   PT Total Time (minutes) 75   PT Mode of treatment - Individual (minutes) 75   PT Mode of treatment - Concurrent (minutes) 0   PT Mode of treatment - Group (minutes) 0   PT Mode of treatment - Co-treat (minutes) 0   PT Mode of Treatment - Total time(minutes) 75 minutes   PT Cumulative Minutes 8739

## 2024-05-21 NOTE — PROGRESS NOTES
05/21/24 1345   Pain Assessment   Pain Assessment Tool 0-10   Pain Score No Pain   Comprehension   Comprehension (FIM) 6 - Has only MILD difficulty with complex/abstract info   Expression   Expression (FIM) 6 - Has only MILD difficulty with complex/abstract info   Social Interaction   Social Interaction (FIM) 6 - Interacts appropriately with others BUT requires extra  time   Problem Solving   Problem solving (FIM) 6 - Solves complex problems BUT requires extra time   Memory   Memory (FIM) 6 - Recognizes with extra time   Speech/Language/Cognition Assessmetn   Treatment Assessment deductive reasoning puzzle [facility] #1 solved in 10:00 with occasional mod cue#1. 4 word category inclusion completed @ 50% dasia and switched to exclusion completed 1/4 trials dasia. 5 letter unscrambling completed 3/5 trials dasia increased to 5/5 min-mod cue.   SLP Therapy Minutes   SLP Time In 1345   SLP Time Out 1450   SLP Total Time (minutes) 65   SLP Mode of treatment - Individual (minutes) 65   SLP Mode of treatment - Concurrent (minutes) 0   SLP Mode of treatment - Group (minutes) 0   SLP Mode of treatment - Co-treat (minutes) 0   SLP Mode of Treatment - Total time(minutes) 65 minutes   SLP Cumulative Minutes 915

## 2024-05-21 NOTE — CASE MANAGEMENT
In preparation for d/c 5/24, DME ordered:Hospital bed semi electric,  lightweight wheelchair with flip back arm rests, elevating leg rests, anti tippers and cushion, to [facility] via [employer].   [Normal] : no nystagmus [FreeTextEntry2] : r baha abutment looks ok with minimal ooze if pressed on and mild tenderness, could be early infx; l ok [de-identified] : gait steady

## 2024-05-21 NOTE — PROGRESS NOTES
PM&R PROGRESS NOTE:  Paras Pitts 68 y.o. male MRN: [id number]  Unit/Bed#: -02 Encounter: [id number]      Rehab Diagnosis: Impairment of mobility, safety, Activities of Daily Living (ADLs), and cognitive/communication skills due to Brain Dysfunction:  02.1  Non-Traumatic  Etiologic Diagnosis: New onset epilepsy/New onset seizure disorder in the setting new right frontal lobe vasogenic edema and radiation necrosis related to metastatic adenocarcinoma right frontal brain mass    HPI: Paras Pitts is a 68 y.o. 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  CT head showing right frontal lobe vasogenic edema with known underlying lesion.  MRI brain showing a right superior frontal mass resection and chemoradiation with unchanged linear enhancement along the surgical cavity compared to 3/26/2024 favoring radiation necrosis.  Patient seen by neurology.  Video EEG showing: Early in recording there was frequent or nearly continuous focal motor seizure and later there were two subclinical right fronto central electrographic seizures.  Patient placed on the following AED regimen: Keppra 2 g twice daily, Vimpat 200 mg twice daily, Onfi 5 mg twice daily.  Of note patient was significantly sedated with Ativan.   Patient to follow-up with epileptologist as an outpatient.  Patient was seen by radiation oncology regarding edema and  radiation necrosis and recommended to increase dexamethasone to 4 mg twice daily.  Patient is under the care of Dr. Langford for medical oncology and is to begin Avastin, chemotherapy radiation therapy for his recurrent mediastinal disease post rehabilitation.  After medical stabilization, patient found to have acute functional deficits from his mobility and self-care, therefore admitted to [facility] for acute inpatient rehabilitation.    SUBJECTIVE: Patient seen face-to-face today in physical therapy.  He reports last night he had an episode of acute shortness of breath.  Discussed with patient and internal medicine regarding recommendation of CT chest PE protocol, however patient is refusing this test at this time.  Currently denies shortness of breath.  Sore throat has improved with Magic mouthwash.  Patient was seen ambulating in hallway requiring min assist ambulating about 30 feet with platform rolling walker and a left off-the-shelf AFO in place    ASSESSMENT: Stable, progressing      PLAN:    Rehabilitation  Functional deficits: Left hemiparesis, left inattention, impaired balance, impaired mobility, self care    Continue current rehabilitation plan of care to maximize function.    Estimated Discharge: Friday 5/24/24 with home care RN, PT, OT, SLP  Wife will be assisting patient at home  Left foot drop: Patient will be sent home with an off-the-shelf left AFO.  Patient may be a candidate for a future custom AFO, due to fluctuating edema and returning function we will hold off on ordering during ARC and recommend home care and outpatient therapy look into this to order in the future    DVT prophylaxis  Continue Lovenox 40 mg SQ daily    Bladder plan  Continent    Bowel plan  Continent      * Seizure disorder (HCC)  Assessment & Plan  New onset seizures presenting for 2024  CT head showing right frontal lobe vasogenic edema with known underlying lesion.    MRI brain showing a right superior frontal mass  resection and chemoradiation with unchanged linear enhancement along the surgical cavity compared to 3/26/2024 favoring radiation necrosis.    Patient seen by neurology.    Video EEG showing: Early in recording there was frequent or nearly continuous focal motor seizure and later there were two subclinical right fronto central electrographic seizures.    Patient placed on the following AED regimen: Keppra 2 g twice daily, Vimpat 200 mg twice daily, Onfi 5 mg twice daily.  *Of note patient was significantly sedated with Ativan.  (Patient also on Mirapex 0.25mg TID presumable for tremors or RLS)  Continue seizure precautions     During ARC, no seizure-like activity thus far.  Patient to follow-up with neurology-epileptologist as an outpatient.    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  Dexamethasone taking 4mg qday per prior providers recently for vasogenic edema (appears to be on 2mg BID prior at home based on chart review)   Patient is under the care of Dr. Langford for medical oncology and is to begin Avastin, chemotherapy radiation therapy for his recurrent mediastinal disease post rehabilitation.  Avastin Infusion 5/15/2024. Increased fatigue 5/17 which pt states is expected .  5/20 improved  Infusion center to set up chemo treatment   CBC, CMP, UA on Monday 5/13   Essentially unremarkable  Recent increased cough - comgmt per IM > improving  Covid/flu/RSV negative 5/13  CXR 5/14 - Right basilar linear opacity, likely atelectasis. Right upper lobectomy.  Encourage IS, flutter      Left hemiparesis  (HCC)  Assessment & Plan  Stable   Worsening left hemiparesis likely related to recent radiation necrosis and vasogenic edema, seizures -and ARC program improving  Decadron for hx of vasogenic edema  Continue acute rehabilitation program to maximize function      Brain mass  Assessment & Plan  Patient with known metastatsis to right frontal lobe s/p right frontal craniotomy in March 2023  H-O recommended increasing dex to 8mg BID x2 days and then will resume chronic Dexamethasone 4mg qday per [facility] H-0  Has been on Dexamethasone taking 4mg qday per prior providers recently for vasogenic edema (appears to be on 2mg BID prior at home based on chart review)   Patient status post chemotherapy radiation SRT, immunotherapy previously  Status post Avastin infusion on 5/15, postinfusion had fatigue which has resolved  Follows with Dr. Langford    Shortness of breath  Assessment & Plan  Shortness of breath starting 5/20/2024  Patient recommended CT chest with PE protocol to rule out PE,  however patient is currently refusing testing today, despite both myself and Dr. Andrade educating.  Monitor saturations closely  Continue Karine  Monitor shortness of breath -patient may be agreeable if symptoms continue      Pancytopenia (HCC)  Assessment & Plan  Post Avastin  WBC 2.31 K  Plt 101 K   Monitor     Cognitive impairment  Assessment & Plan  Cog stable 5/17; no recent safety concerns reported  Did have fall 5/11 when he tried to reach down to  urinal - MD mckenna'isidra without signs of injury and patient continues to deny injury from fall  - High fall precautions with frequent rounding - if increased safety concerns consider q15 checks or 1:1  - MOCA completed 5/9 - obtain results   - Increased risk of delirium - monitor   - Patient/family/CG teaching   - Optimal Sleep/wake cycle management  - Limit sedating/deliriogenic meds when possible  - Optimal medical, mood, and pain management   - Skilled therapies as outlined   -  Compensatory strategies   - Optimize oversight after discharge  - OP follow-up with PCP and neuro      Cough  Assessment & Plan  Cough and sore throat -improved sore throat with Magic mouthwash  Strep test negative  Continue Duonebs, Magic Mouthwash     Comgmt with IM team  Monitor lung exam, vitals with and without activity  Encourage incentive spirometry/flutter valve   Pt afebrile no leukocytosis; saturating well on RA   Covid/Flu/RSV PCR negative 5/13  CXR 5/14 - Right basilar linear opacity, likely atelectasis. Right upper lobectomy.  Mucinex BID and Robitussin DM PRN per IM     Bilateral edema of lower extremity  Assessment & Plan  Stable; if worsens or becomes painful obtain BLE venous doppler   1+ Pitting edema bilaterally likely secondary to increased dependent position.    On Lovenox for DVT prophylaxis   TEDS daily  Elevate limbs more    Sore throat  Assessment & Plan  Improved; Covid/RSV/flu negative 5/13   PRN Robitussin ordered, lozenges, magic mouthwash  See section for cough    Adjustment disorder  Assessment & Plan  Mood acceptable   Supportive counseling  Consider neuropsychology consultation if needed     Pseudobulbar affect  Assessment & Plan  Mood is much improved without emotional lability noted (week of 5/6/2024)  On admission at times was tearful and emotionally labile  May benefit from a future SSRI if continues      Constipation  Assessment & Plan  Stooling adequately   Miralax qday  Declines supp  If needed add senna      History of CVA (cerebrovascular accident)  Assessment & Plan  Patient with ischemic CVA in 2011  Resultant mild left hemiparesis    Hypercholesterolemia  Assessment & Plan  Continue Lipitor 40 mg daily (home dose)    Essential hypertension  Assessment & Plan  Continue Norvasc 10 mg daily and losartan 50 mg daily (home dose)  Monitor BP and heart rate during rest and with activity  Internal medicine managing    Overweight (BMI 25.0-29.9)  Assessment & Plan  Dietary and  "lifestyle changes when able          Appreciate IM consultants medical co-management.  Labs, medications, and imaging personally reviewed.      ROS:  A ten point review of systems was completed on 05/21/24 and pertinent positives are listed in subjective section. All other systems reviewed were negative.       OBJECTIVE:   /85 (BP Location: Right arm)   Pulse 80   Temp 98.6 °F (37 °C) (Temporal)   Resp 20   Ht 5' 9.02\" (1.753 m)   Wt 95.4 kg (210 lb 5.1 oz)   SpO2 94%   BMI 31.04 kg/m²       Physical Exam  Vitals and nursing note reviewed.   Constitutional:       General: He is not in acute distress.  HENT:      Head: Normocephalic and atraumatic.      Nose: Nose normal.      Mouth/Throat:      Mouth: Mucous membranes are moist.   Eyes:      Conjunctiva/sclera: Conjunctivae normal.   Cardiovascular:      Rate and Rhythm: Normal rate and regular rhythm.      Pulses: Normal pulses.   Pulmonary:      Effort: Pulmonary effort is normal.      Breath sounds: Normal breath sounds. No wheezing or rales.   Abdominal:      General: Bowel sounds are normal. There is no distension.      Palpations: Abdomen is soft.      Tenderness: There is no abdominal tenderness.   Musculoskeletal:         General: Swelling present.      Cervical back: Neck supple.   Skin:     General: Skin is warm.   Neurological:      Mental Status: He is alert and oriented to person, place, and time.      Motor: Weakness (left) present.      Gait: Gait abnormal.      Comments: Patient was seen ambulating in hallway requiring min assist ambulating about 30 feet with platform rolling walker and a left off-the-shelf AFO in place     Psychiatric:         Mood and Affect: Mood normal.          Lab Results   Component Value Date    WBC 2.31 (L) 05/20/2024    HGB 11.5 (L) 05/20/2024    HCT 35.0 (L) 05/20/2024    MCV 97 05/20/2024     (L) 05/20/2024     Lab Results   Component Value Date    SODIUM 140 05/20/2024    K 3.9 05/20/2024     " 05/20/2024    CO2 32 05/20/2024    BUN 12 05/20/2024    CREATININE 0.63 05/20/2024    GLUC 143 (H) 05/20/2024    CALCIUM 8.5 05/20/2024     Lab Results   Component Value Date    INR 0.92 04/20/2024    INR 0.98 08/30/2023    INR 1.06 03/24/2023    PROTIME 12.6 04/20/2024    PROTIME 13.2 08/30/2023    PROTIME 14.0 03/24/2023           Current Facility-Administered Medications:     acetaminophen (TYLENOL) tablet 975 mg, 975 mg, Oral, Q8H PRN, Jenise Dawson PA-C, 975 mg at 05/21/24 0521    albuterol (PROVENTIL HFA,VENTOLIN HFA) inhaler 2 puff, 2 puff, Inhalation, Q4H PRN, Omer Andrade MD    alteplase (CATHFLO) injection 2 mg, 2 mg, Intracatheter, Q1MIN PRN, Collin Langford MD    aluminum-magnesium hydroxide-simethicone (MAALOX) oral suspension 30 mL, 30 mL, Oral, Q6H PRN, Jenise Dawson PA-C    amLODIPine (NORVASC) tablet 10 mg, 10 mg, Oral, Daily, Jenise Dawson PA-C, 10 mg at 05/21/24 0831    atorvastatin (LIPITOR) tablet 40 mg, 40 mg, Oral, After Dinner, Jenise Dawson PA-C, 40 mg at 05/20/24 1713    bisacodyl (DULCOLAX) rectal suppository 10 mg, 10 mg, Rectal, Daily PRN, Omer Andrade MD    cloBAZam (ONFI) tablet 5 mg, 5 mg, Oral, BID, Jenise Dawson PA-C, 5 mg at 05/21/24 0832    dexamethasone (DECADRON) tablet 4 mg, 4 mg, Oral, Daily, Layton Yoder MD, 4 mg at 05/21/24 0831    dextromethorphan-guaiFENesin (ROBITUSSIN DM) oral syrup 10 mL, 10 mL, Oral, Q4H PRN, Corina Arita MD, 10 mL at 05/20/24 2059    diphenhydramine, lidocaine, Al/Mg hydroxide, simethicone (Magic Mouthwash) oral solution 10 mL, 10 mL, Swish & Swallow, Q4H PRN, Omer Andrade MD, 10 mL at 05/20/24 2136    docusate sodium (COLACE) capsule 100 mg, 100 mg, Oral, BID PRN, Corina Arita MD    enoxaparin (LOVENOX) subcutaneous injection 40 mg, 40 mg, Subcutaneous, Daily, Corina Arita MD, 40 mg at 05/21/24 0830    guaiFENesin (MUCINEX) 12 hr tablet 600 mg, 600 mg, Oral, Q12H LEN,  Omer Andrade MD, 600 mg at 05/21/24 0832    ipratropium-albuterol (DUO-NEB) 0.5-2.5 mg/3 mL inhalation solution 3 mL, 3 mL, Nebulization, TID, Corina Arita MD, 3 mL at 05/21/24 0838    lacosamide (VIMPAT) tablet 200 mg, 200 mg, Oral, Q12H LEN, Jenise Dawson PA-C, 200 mg at 05/21/24 0831    levETIRAcetam (KEPPRA) tablet 2,000 mg, 2,000 mg, Oral, Q12H LEN, Jenise Dawson PA-C, 2,000 mg at 05/21/24 0735    losartan (COZAAR) tablet 50 mg, 50 mg, Oral, Daily, Jenise Dawson PA-C, 50 mg at 05/21/24 0832    ondansetron (ZOFRAN-ODT) dispersible tablet 4 mg, 4 mg, Oral, Q6H PRN, Jenise Dawson PA-C    pantoprazole (PROTONIX) EC tablet 40 mg, 40 mg, Oral, Early Morning, Jenise Dawson PA-C, 40 mg at 05/21/24 0519    polyethylene glycol (MIRALAX) packet 17 g, 17 g, Oral, Daily PRN, Corina Arita MD, 17 g at 05/14/24 0923    polyethylene glycol (MIRALAX) packet 17 g, 17 g, Oral, Daily, Layton Yoder MD, 17 g at 05/18/24 0808    pramipexole (MIRAPEX) tablet 0.25 mg, 0.25 mg, Oral, TID, Jenise Dawson PA-C, 0.25 mg at 05/21/24 0832    Past Medical History:   Diagnosis Date    Brain compression (HCC) 03/20/2023    Brain mass 03/20/2023    Cancer (HCC) 2001    Receint lung and brain lesions and prostate cancer in 2001    Cerebral edema (HCC) 03/20/2023    Hypertension     Prostate cancer (HCC) 2001    Rectal bleeding     Stroke (HCC)     2011         Patient Active Problem List    Diagnosis Date Noted    Seizure disorder (HCC) 05/02/2024    Metastatic adenocarcinoma (HCC) 2023    Left hemiparesis (HCC) 05/02/2024    Brain mass 03/20/2023    Shortness of breath 05/21/2024    Pancytopenia (HCC) 05/20/2024    Cough 05/14/2024    Cognitive impairment 05/14/2024    Bilateral edema of lower extremity 05/10/2024    Sore throat 05/08/2024    Frontal mass of brain 05/02/2024    Pseudobulbar affect 05/02/2024    Adjustment disorder 05/02/2024    Constipation  04/25/2024    Seizure-like activity (HCC) 04/23/2024    Generalized weakness 04/20/2024    Tremors of nervous system 04/20/2024    History of CVA (cerebrovascular accident) 06/22/2023    Chemotherapy-induced neutropenia (HCC) 05/03/2023    Encounter for central line care 05/01/2023    Carcinoma of right lung (HCC) 04/13/2023    Lung nodule 03/21/2023    Hypercholesterolemia 09/20/2021    Essential hypertension 09/11/2020    Annual physical exam 09/11/2020    Negative depression screening 02/24/2020    Overweight (BMI 25.0-29.9) 02/24/2020          Corina Arita MD    I have spent a total time of 55 minutes on 05/21/24 in caring for this patient including Impressions, Counseling / Coordination of care, Documenting in the medical record, Communicating with other healthcare professionals , and personally spent time with internal medicine today counseling on testing to rule out pulmonary embolus .      ** Please Note:  voice to text software may have been used in the creation of this document. Although proof errors in transcription or interpretation are a potential of such software**

## 2024-05-21 NOTE — PROGRESS NOTES
"Progress Note - Paras Pitts 68 y.o. male MRN: [id number]    Unit/Bed#: -02 Encounter: [id number]        Subjective:   Patient seen and examined at bedside after reviewing the chart and discussing the case with the caring staff.      Patient examined at bedside.  Patient reports acute shortness of breath episode last night when he was lying flat on the bed.  Patient was unable to breathe in or out.  Was afraid as it has never happened to him before in his life.  Patient sat up on the bed and that episode resolved.  Patient did coughed up thick mucus at that point.  Patient denied any shortness of breath at the time of examination.    I reviewed the labs from 5/20/2024.  Patient's CMP showed glucose 143.  Patient's CBC showed hemoglobin 11.5 with hematocrit 35.0.    Physical Exam   Vitals: Blood pressure 152/85, pulse 80, temperature 98.6 °F (37 °C), temperature source Temporal, resp. rate 20, height 5' 9.02\" (1.753 m), weight 95.4 kg (210 lb 5.1 oz), SpO2 94%.,Body mass index is 31.04 kg/m².  Constitutional: Patient in no acute distress.  HEENT: PERR, EOMI, MMM.  Mild/moderate erythema involving the pharynx  Cardiovascular: Normal rate and regular rhythm.    Pulmonary/Chest: Normal effort and breath sounds normal.  Coarse crackles scattered bilateral lung base  Abdomen: Soft, + BS, NT.    Assessment/Plan:  Paras Pitts is a(n) 68 y.o. male with tremors of the nervous system.     Cardiac Hx w/ HTN, HLD, hx of CVA.  Continue amlodipine 10 mg daily, Losartan 50 mg daily, atorvastatin 40 mg daily.  Type 2 diabetes mellitus.  Hgb A1c 6.7% on 5/3/24.  Carb controlled diet.   GERD.  Continue Protonix 40 mg daily.  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  Stage [place].  S/p robotic converted to right thoracotomy and right upper lobectomy on 9/1/2023. S/p bronchoscopy with EBUS at [facility] on 4/16/2024.  Continue Decadron 4 mg daily.  Patient [facility] 4/[facility].  Received Avastin " infusion 5/15.  Pain and bowel regimen. As per physiatry.  Acute pharyngitis.  I will get throat culture to rule out strep.  Patient may use Magic mouthwash every 4 hours as needed.  Patient may also get lozenges.  Cough/congestion/shortness of breath.  Patient has been put on DuoNebs 3 times daily and albuterol inhaler as needed along with Mucinex twice daily and Robitussin as needed.  COVID/RSV/flu test 5/13 found to be negative.  CXR was negative for pneumonia 5/14/2024. Discussed with the patient getting CTA to rule out PE 5/21/2024.  Patient does not want that study for now.  Tremors of the nervous system.  Continue Keppra 2000 mg twice daily, Vimpat 200 mg twice daily, clobazam 5 mg twice daily.  Follow up in 4 weeks with epilepsy attending.  Patient receiving intensive PT OT as per physiatry.     Anticipated date of discharge.  Wednesday, 5/22/2024

## 2024-05-22 LAB — BACTERIA THROAT CULT: NORMAL

## 2024-05-22 PROCEDURE — 97530 THERAPEUTIC ACTIVITIES: CPT

## 2024-05-22 PROCEDURE — 97112 NEUROMUSCULAR REEDUCATION: CPT

## 2024-05-22 PROCEDURE — 97116 GAIT TRAINING THERAPY: CPT

## 2024-05-22 PROCEDURE — 97535 SELF CARE MNGMENT TRAINING: CPT

## 2024-05-22 PROCEDURE — 99233 SBSQ HOSP IP/OBS HIGH 50: CPT | Performed by: PHYSICAL MEDICINE & REHABILITATION

## 2024-05-22 PROCEDURE — 97542 WHEELCHAIR MNGMENT TRAINING: CPT

## 2024-05-22 PROCEDURE — 92507 TX SP LANG VOICE COMM INDIV: CPT

## 2024-05-22 PROCEDURE — 97110 THERAPEUTIC EXERCISES: CPT

## 2024-05-22 PROCEDURE — 92526 ORAL FUNCTION THERAPY: CPT

## 2024-05-22 RX ADMIN — LOSARTAN POTASSIUM 50 MG: 50 TABLET, FILM COATED ORAL at 09:43

## 2024-05-22 RX ADMIN — GUAIFENESIN 600 MG: 600 TABLET ORAL at 20:36

## 2024-05-22 RX ADMIN — DEXAMETHASONE 4 MG: 4 TABLET ORAL at 09:43

## 2024-05-22 RX ADMIN — CLOBAZAM 5 MG: 10 TABLET ORAL at 09:42

## 2024-05-22 RX ADMIN — ENOXAPARIN SODIUM 40 MG: 40 INJECTION SUBCUTANEOUS at 09:42

## 2024-05-22 RX ADMIN — PRAMIPEXOLE DIHYDROCHLORIDE 0.25 MG: 0.12 TABLET ORAL at 20:36

## 2024-05-22 RX ADMIN — IPRATROPIUM BROMIDE AND ALBUTEROL SULFATE 3 ML: 2.5; .5 SOLUTION RESPIRATORY (INHALATION) at 09:42

## 2024-05-22 RX ADMIN — LACOSAMIDE 200 MG: 150 TABLET ORAL at 20:36

## 2024-05-22 RX ADMIN — PANTOPRAZOLE SODIUM 40 MG: 40 TABLET, DELAYED RELEASE ORAL at 06:20

## 2024-05-22 RX ADMIN — GUAIFENESIN 600 MG: 600 TABLET ORAL at 09:42

## 2024-05-22 RX ADMIN — IPRATROPIUM BROMIDE AND ALBUTEROL SULFATE 3 ML: 2.5; .5 SOLUTION RESPIRATORY (INHALATION) at 20:36

## 2024-05-22 RX ADMIN — LEVETIRACETAM 2000 MG: 500 TABLET, FILM COATED ORAL at 09:42

## 2024-05-22 RX ADMIN — PRAMIPEXOLE DIHYDROCHLORIDE 0.25 MG: 0.12 TABLET ORAL at 09:43

## 2024-05-22 RX ADMIN — AMLODIPINE BESYLATE 10 MG: 10 TABLET ORAL at 09:43

## 2024-05-22 RX ADMIN — IPRATROPIUM BROMIDE AND ALBUTEROL SULFATE 3 ML: 2.5; .5 SOLUTION RESPIRATORY (INHALATION) at 16:40

## 2024-05-22 RX ADMIN — PRAMIPEXOLE DIHYDROCHLORIDE 0.25 MG: 0.12 TABLET ORAL at 16:40

## 2024-05-22 RX ADMIN — DIPHENHYDRAMINE HYDROCHLORIDE AND LIDOCAINE HYDROCHLORIDE AND ALUMINUM HYDROXIDE AND MAGNESIUM HYDRO 10 ML: KIT at 10:11

## 2024-05-22 RX ADMIN — LACOSAMIDE 200 MG: 150 TABLET ORAL at 09:43

## 2024-05-22 RX ADMIN — ATORVASTATIN CALCIUM 40 MG: 40 TABLET, FILM COATED ORAL at 17:20

## 2024-05-22 RX ADMIN — LEVETIRACETAM 2000 MG: 500 TABLET, FILM COATED ORAL at 20:36

## 2024-05-22 RX ADMIN — CLOBAZAM 5 MG: 10 TABLET ORAL at 17:20

## 2024-05-22 NOTE — CASE MANAGEMENT
Update clinical reviewed faxed to [employer] via [employer], awaiting determination.CM met with patient and wife Mary Collette, reviewed team meeting information, including insurance review and potential for extending discharge date to next week, if patient  or wife are not comfortable and need more family training. Both patient and Mary Collette stated they are comfortable with d/c on  Friday 5/24. Mary Collette reported she is getting the RW and platform for the left upper extremity on the RW.  CM received message from [employer], that they do not accept patient's insurance. Mary collette stated she has spoken with them. CM re-ordered equipment through [facility] for home delivery of hospital bed, and Wheelchair delivery to patient 's bedside.  CM will continue to follow.

## 2024-05-22 NOTE — TEAM CONFERENCE
Acute RehabilitationTeam Conference Note  Date: 5/22/2024   Time: 10:37 AM       Patient Name:  Paras Pitts       Medical Record Number: [id number]   YOB: 1955  Sex: Male          Room/Bed:  [facility] 213/[facility] 213-02  Payor Info:  Payor: TOM CROSS / Plan: MISC BLUE CROSS / Product Type: Blue Fee for Service /      Admitting Diagnosis: Brain mass [G93.89]   Admit Date/Time:  5/1/2024  5:56 PM  Admission Comments: No comment available     Primary Diagnosis:  Seizure disorder (HCC)  Principal Problem: Seizure disorder (HCC)    Patient Active Problem List    Diagnosis Date Noted    Shortness of breath 05/21/2024    Pancytopenia (HCC) 05/20/2024    Cough 05/14/2024    Cognitive impairment 05/14/2024    Bilateral edema of lower extremity 05/10/2024    Sore throat 05/08/2024    Frontal mass of brain 05/02/2024    Seizure disorder (HCC) 05/02/2024    Left hemiparesis (HCC) 05/02/2024    Pseudobulbar affect 05/02/2024    Adjustment disorder 05/02/2024    Constipation 04/25/2024    Seizure-like activity (HCC) 04/23/2024    Generalized weakness 04/20/2024    Tremors of nervous system 04/20/2024    History of CVA (cerebrovascular accident) 06/22/2023    Chemotherapy-induced neutropenia (HCC) 05/03/2023    Encounter for central line care 05/01/2023    Carcinoma of right lung (HCC) 04/13/2023    Lung nodule 03/21/2023    Brain mass 03/20/2023    Metastatic adenocarcinoma (HCC) 2023    Hypercholesterolemia 09/20/2021    Essential hypertension 09/11/2020    Annual physical exam 09/11/2020    Negative depression screening 02/24/2020    Overweight (BMI 25.0-29.9) 02/24/2020       Physical Therapy:    Weight Bearing Status: Weight Bearing as Tolerated  Transfers:  (min-mod A STS with PFRW, min-mod A sit pivot no AD, mod-max A SPT with PFRW)  Bed Mobility:  (min-mod up to mod-max A)  Amulation Distance (ft): 48 feet  Ambulation: Minimal Assistance, Moderate Assistance  Assistive Device for Ambulation:  (PFRW and L off shelf  AFO)  Wheelchair Mobility Distance: 201 ft  Wheelchair Mobility: Supervision, Independent  Number of Stairs: 3  Assistive Device for Stairs: Bilateral Hand Rails  Stair Assistance: Assist of 2 (mod -max A x 1 plus min A of another going up forward and down backwards)  Discharge Recommendations: Home with:  DC Home with:: 24 Hour Assisteance, Family Support, First Floor Setup, Home Physical Therapy    05/08/2024:   Patient motivated and participating in therapy.   Patient MOD-MAX A bed mobility, MOD A STS with PFRW, MOD A sit pivot surface to surfaces using no AD, MOD-MAX A SPT using grab bar(in bathroom), MOD A ambulation up to 33' level surfaces with PFRW and wheelchair follow, MIN A wheelchair mobility 197' level and unlevel  surfaces.  Steps not assessed.   Patient limited by decreased ROM/strength, decreased balance and safety, decreased endurance, impaired LE tone.  Patient may benefit from continued inpatient [facility] PT to increase function, safety, and improved independence in prep for safe d/c to home with family and continued PT services as needed.    05/15/2024:   Patient participating in therapy and making slow, but positive gains.   Patient MOD A bed mobility, MIN-MOD A sit pivot transfers surface to surface, MOD A STS with PFRW, S wheelchair mobility level surfaces, MOD A x1 ambulation up to 56' with PFRW with second person for wheelchair follow, mod-max A x 1 plus min A of 1 negotiation of 3 steps with 2 handrails going up forward and down backwards.   Patient limited by decreased ROM/strength, decreased balance and safety, decreased endurance, impaired LE tone.  Patient may benefit from continued inpatient [facility] PT to increase function, safety, and improved independence in prep for safe d/c to home with family and continued PT services as needed.    05/22/2024:   Patient participating in therapy and making slow, yet positive gains.  Patient MOD-occ MAX A bed mobility, MIN-MOD A STS transfers with PFRW,  MIN-MOD A sit pivot transfers surface to surface, MOD-MAX A SPT with PFRW, S/MOD I wheelchair mobility up to 201' level and unlevel surfaces, MIN-MOD A ambulation up to 48' level and unlevel surfaces with PFRW and L off shelf AFO.  Remains limited by decreased ROM/strength, decreased balance and safety, decreased endurance, impaired, LE tone, and impaired cognition.  Patient may benefit from continued inpatient [facility] PT to complete family training for increased function, safety, and improved independence in prep for safe d/c to home with family and continued PT services as needed.    Occupational Therapy:  Eating: Supervision  Grooming: Supervision  Bathing: Moderate Assistance, Minimal Assistance  Bathing: Moderate Assistance, Minimal Assistance  Upper Body Dressing: Minimal Assistance  Lower Body Dressing: Moderate Assistance  Toileting: Moderate Assistance  Tub/Shower Transfer:  (TBA)  Toilet Transfer: Assist of 2  Cognition: Exceptions to WNL  Cognition: Decreased Attention, Decreased Safety, Decreased Memory  Orientation: Person, Place, Time, Situation  Discharge Recommendations: Home with:  DC Home with:: First Floor Setup, Family Support, Home Occupational Therapy       5/7/24: Pt's current LOF listed above. Pt's barriers include : decreased balance, endurance, generalized strength, ROM, coordination, cognition (attention, comprehension, problem solving), and safety awareness. Pt currently participating in TE, TA, ADL training, fxl transfers/mobility, cognitive training, NMR, and activity tolerance in order to progress towards goals. Pt would benefit from continued skilled OT to address barriers and prepare for safe d/c.    5/15/24: Pt's current LOF listed above. Pt's barriers include : decreased balance, endurance, generalized strength, ROM, coordination, cognition (attention, comprehension, problem solving), and safety awareness. Pt currently participating in TE, TA, ADL training, fxl transfers/mobility,  cognitive training, NMR, and activity tolerance in order to progress towards goals. Pt would benefit from continued skilled OT to address barriers and prepare for safe d/c.    Speech Therapy:  Mode of Communication: Verbal  Cognition: Exceptions to WNL  Cognition: Decreased Memory, Decreased Executive Functions, Decreased Attention  Orientation: Person, Place, Time, Situation  Swallowing: Within Defined Limits  Diet Recommendations: Regular Diet, Thin  Discharge Recommendations: Home with:  DC Home with:: Family Support, Outpatient Speech Therapy  5/8   Comprehension   Assist Devices Glasses   Comprehension (FIM) 5 - Needs help/cues, repetition only RARELY ( < 10% of the time)   Expression   Expression (FIM) 5 - Needs help/cues only RARELY (< 10% of the time)   Social Interaction   Social Interaction (FIM) 5 - Interacts appropriately with others 90% of time   Problem Solving   Problem solving (FIM) 5 - Solves basic problems 90% of time   Memory   Memory (FIM) 5 - Recalls/performs request 90% of time   Sensation   Additional Comments Pt reports numbless L side ( baseline)       Cognitive Linguisitic Assessments   The Repeatable Battery for the Assessment of Neuropsychological Status (RBANS)  The Repeatable Battery for the Assessment of Neuropsychological Status (RBANS) is a brief, individually-administered assessment which measures attention, language, visuospatial/constructional abilities, and immediate & delayed memory. The RBANS is intended for use with adolescents to adults, ages 12 to 89 years. The following results were obtained during the administration of the assessment.     Form: A  “IE” indicates the scores from the initial evaluation (4/4/23). Form: A     Cognitive Domain/Subtest: Index Score: Percentile Rank: Classification: IE Index Score: Status:   IMMEDIATE MEMORY 81 10%ile Low Average 103 DECLINE        1. List Learning (21/40)            2. Story Memory (12/24)                VISUOSPATIAL/  CONSTRUCTIONAL 102 55%ile Average 109 NO CHANGE        3. Figure Copy (20/20)            4. Line Orientation (13/20)               LANGUAGE 82 12%ile Low Average 85 NO CHANGE        5. Picture Naming (10/10)            6. Semantic Fluency (8/40)               ATTENTION 82 12%ile Low Average 91 DECLINE        7. Digit Span (8/16)            8. Coding (28/89)               DELAYED MEMORY 101 53%ile Average 112 DECLINE        9. List Recall (5/10)            10. List Recognition (19/20)            11. Story Recall (7/12)            12. Figure Recall (13/20)                Sum of Index Scores:  448   500 DECLINE   Total Score:  85         Percentile: 16%ile         Classification: Low Average                           5/15   UPDATED FIM LEVELS   Comprehension   Assist Devices Glasses   Comprehension (FIM) 6 - Has only MILD difficulty with complex/abstract info   Expression   Expression (FIM) 6 - Expresses complex/abstract but requires:  more time   Social Interaction   Social Interaction (FIM) 6 - Interacts appropriately with others BUT requires extra  time   Problem Solving   Problem solving (FIM) 5 - Solves basic problems 90% of time   Memory   Memory (FIM) 5 - Recalls/performs request 90% of time       5/21   UPDATED FIM LEVELS   Comprehension   Comprehension (FIM) 6 - Has only MILD difficulty with complex/abstract info   Expression   Expression (FIM) 6 - Has only MILD difficulty with complex/abstract info   Social Interaction   Social Interaction (FIM) 6 - Interacts appropriately with others BUT requires extra  time   Problem Solving   Problem solving (FIM) 6 - Solves complex problems BUT requires extra time   Memory   Memory (FIM) 6 - Recognizes with extra time     LTG - comprehension, expression, problem solving and memory ( mod I )     Nursing Notes:  Appetite: Good  Diet Type: Cardiac, Other (Specify) (4 GM sodium)                      Diet Patient/Family Education Complete: Yes                          Type of Wound Patient/Family Education: No  Bladder: 3 - Moderate Assistance     Bladder Patient/Family Education: Yes  Bowel: 2 - Maximal Assistance     Bowel Patient/Family Education: Yes  Pain Location/Orientation: Orientation: Right, Location: Hip  Pain Score: 0                       Hospital Pain Intervention(s): Repositioned, Rest  Pain Patient/Family Education: Yes  Medication Management/Safety  Injectable: Lovenox  Safe Administration: Yes  Medication Patient/Family Education Complete: Yes    5/6/24 Patient is a recent admission to [facility] with seizure disorder.  Patient was still actively receiving cancer treatments prior to hospitalization.  Patient remains alert and oriented. Left sided weakness continues.  Trace lower extremity edema.  Patient remains continent of bowel and bladder.  Skin remains intact with just scattered bruising noted.  Patient requires two assist for transfers.    5/13/24  New onset tremors LUE, LLE thought to be partial seizures improved with vimpat, keppra, and onfi. Pt with lung cancer with mets to the brain and mediastinal lymph nodes. S/p brain resection of the tumor 3/23. Right chest wall port unaccessed: still receiving chemo and radiation prior to hospitalization-plan to restart after rehab. Hx: Stroke, HTN, Cancer recent lung and brain lesions, prostate CA. Left upper extremity weak and LLE very weak. SPT x2 for transfers. A&Ox4, HRR, lungs diminished with scattered crackles and wheezing. Negative for Flu/Covid. Pending CXR on 5/14/24 to rule out pneumonia. Edema-b/l le, LLE>RLE TEDS. Fell from chair on 5/11/24. Chemo infusion scheduled for Wednesday 5/15-infusion will set up.      5/20/24 New onset tremors LUE, LLE thought to be partial seizures improved with vimpat, keppra, and onfi. Pt with lung cancer with mets to the brain and mediastinal lymph nodes. S/p brain resection of the tumor 3/23. Right chest wall port unaccessed: still receiving chemo and radiation prior  to hospitalization-plan to restart after rehab. Hx: Stroke, HTN, Cancer recent lung and brain lesions, prostate CA. Left upper extremity weak and LLE very weak. SPT x2 for transfers. A&Ox4, HRR, lungs diminished with scattered crackles and wheezing. Negative for Flu/Covid. Pending CXR on 5/14/24 to rule out pneumonia. Edema-b/l le, LLE>RLE TEDS. Fell from chair on 5/11/24. Chemo infusion done on 5/15.       Case Management:     Discharge Planning  Living Arrangements: Lives w/ Spouse/significant other  Support Systems: Spouse/significant other, Friends/neighbors, Friend, Family members  Assistance Needed: n/a  Type of Current Residence: Private residence  Current Home Care Services: No  Patient [facility] on 5/1 seizure disorder, lungs ca with mets to brain.   D/c changed to 5/24, to facilitate family training with wife. DME ordered,  [facility] reserved ST, will need to be followed up as outpt , when patient goes outpt with other therapies. Insurance update due today 5/22.    Is the patient actively participating in therapies? yes  List any modifications to the treatment plan: na    Barriers Interventions   Left peter-paresis, foot drop, coordination  Therapeutic exercise, therapeutic activity, AFO    SOB, hx lung CA medial-sternal mass Nebulizer, medical management and oversight, received chemotherapy, monitor labwork   Decreased balance Therapeutic exercise, therapeutic activity   Decreased cog, safety ST strategies, ADL/transfer/gait training   Decreased end Therapeutic exercise, therapeutic activity     Is the patient making expected progress toward goals? yes  List any update or changes to goals: na    Medical Goals: Patient will be able to manage medical conditions and comorbid conditions with medications and follow up upon completion of rehab program    Weekly Team Goals:   Rehab Team Goals  ADL Team Goal: Patient will require assist with ADLs with least restrictive device upon completion of rehab  program  Bowel/Bladder Team Goal: Patient will be independent with bladder/bowel management with least restrictive device upon completion of rehab program  Transfer Team Goal: Patient will require assist with transfers with least restrictive device upon completion of rehab program  Locomotion Team Goal: Patient will require assist with locomotion with least restrictive device upon completion of rehab program  Cognitive Team Goal: Patient will return to premorbid level of cognitive activity upon completion of rehab program    Complete family training  CG/Min assist Bed mobility, transfers, and mobility  Mod I wc mobility  Mod I Comprehension, expression, memory, and problem solving   Min assist/CG self care    Health and Wellness: to be able to return to [employer] and working at [employer]    Discussion: Plan for return home with spouse with [facility] for PT, OT, nursing, and aides with progression to outpatient PT, OT, and ST    Anticipated Discharge Date:  May 28, 2024 - date extended for additional family training

## 2024-05-22 NOTE — PROGRESS NOTES
"   05/22/24 1137   Pain Assessment   Pain Assessment Tool 0-10   Pain Score No Pain   Restrictions/Precautions   Precautions Bed/chair alarms;Cognitive;Fall Risk;Multiple lines;Seizure;Supervision on toilet/commode  (R chest wall chemo port)   Weight Bearing Restrictions No   ROM Restrictions No   Eating   Type of Assistance Needed Set-up / clean-up   Physical Assistance Level No physical assistance   Comment assist to open containers, pt able to cut turkey with side of fork   Eating CARE Score 5   Coordination   Fine Motor popsicle stick activity with R hand giving sticks to L hand to place in matching color slots, pt able to hold with fair two or three digit pinch  with VC's from OT for adequate and effective UE placement in between trials as pt would hang L hand off of activity without control; as activity progressed, pt became more accurate in target acquisition and pinch    Cognition   Overall Cognitive Status Impaired   Arousal/Participation Alert;Responsive;Cooperative   Attention Attends with cues to redirect   Orientation Level Oriented X4   Memory Decreased short term memory;Decreased recall of precautions   Following Commands Follows one step commands with increased time or repetition   Assessment   Treatment Assessment Pt agreeable to OT session this AM. Received sitting upright in w/c. Pt reported just woke up from nap and was still tired. Pt completed fine motor activity with increased time to adequately complete, though accuracy improved as session progressed and pt reported he \"woke up more\". Generalized impaired ROM/strength and coordination on L side remains. Set up for lunch tray at end of session. Pt's barriers to d/c include decreased strength throughout but especially L side s/p previous CVA, decreased balance, impaired coordination L side, impaired safety awareness, problem solving, and attention, and decreased activity tolerance; all affect independence in self care and fxl transfers. " Pt would benefit from continued skilled OT services in order to address listed barriers and prepare for safe d/c.   Prognosis Good   Problem List Decreased range of motion;Decreased strength;Decreased endurance;Impaired balance;Decreased coordination;Decreased cognition;Impaired judgement;Decreased safety awareness   Plan   Treatment/Interventions ADL retraining;Functional transfer training;LE strengthening/ROM;Endurance training;Therapeutic exercise;Cognitive reorientation;Patient/family training;Equipment eval/education;Compensatory technique education;OT   Progress Progressing toward goals   OT Therapy Minutes   OT Time In 1137   OT Time Out 1204   OT Total Time (minutes) 27   OT Mode of treatment - Individual (minutes) 27

## 2024-05-22 NOTE — PROGRESS NOTES
"   05/22/24 9644   Pain Assessment   Pain Assessment Tool 0-10   Pain Score No Pain   Restrictions/Precautions   Precautions Bed/chair alarms;Cognitive;Fall Risk;Multiple lines;Seizure;Supervision on toilet/commode   Weight Bearing Restrictions No   ROM Restrictions No   Cognitive Linguisitic Assessments   Cognitive Linquistic Quick Test (CLQT) RBANS completed   Comprehension   Assist Devices Glasses   QI: Comprehension 3. Usually Understands: Understands most conversations, but misses some part/intent of message. Requires cues at times to understand.   Comprehension (FIM) 6 - Has only MILD difficulty with complex/abstract info   Expression   QI: Expression 4. Express complex messages without difficulty and with speech that is clear and easy to understan   Expression (FIM) 6 - Has only MILD difficulty with complex/abstract info   Social Interaction   Social Interaction (FIM) 6 - Interacts appropriately with others BUT requires extra  time   Problem Solving   Problem solving (FIM) 6 - Solves complex problems BUT requires extra time   Memory   Memory (FIM) 6 - Recognizes with extra time   Memory Skills   Orientation Level Oriented X4   Language Assessments   [place] Diagnostic Aphasia Exam (BDAE) BDAE short form   Informal Speech Language Assessment TAWF-2 completed   Speech/Language/Cognition Assessmetn   Treatment Assessment Patient engaging in conversation surrounding discharge, therapy activities, experiences since our last treatment session together.  Patient able to recall and report various therapy activities from today and day's prior including ankle flexion, walking, work in bed position, marching, etc.  Patient reports good overall understanding of safety, transfers and items needed in place prior to discharge home.  Patient accurately describing foot placement, strategies to move left leg, not shifting weight, etc.  Pt reports turning to his \"right\" is much easier to bring the left leg along vs closing in on " "left leg when turning to his left.  Patient self reports that sequencing and recalling instruction is difficult, worsened when having to read strategies.   Swallow Information   Current Risks for Dysphagia & Aspiration New Neuro event   Current Symptoms/Concerns Clear throat   Current Diet Regular;Thin liquid   Baseline Diet Regular;Thin liquids   Consistencies Assessed and Performance   Materials Admnistered Regular/Solid;Thin liquid   Oral Stage WFL   Phargngeal Stage Mild impaired   Swallow Mechanics Mild delayed   Strategies and Efficacy small bites; small sips; slow rate   Recommendations   Risk for Aspiration Mild   Recommendations Consider oral diet;Dysphagia treatment   Diet Solid Recommendation Regular consistency   Diet Liquid Recommendation Thin liquid   Recommended Form of Meds Whole;With puree   General Precautions Aspiration precautions   Compensatory Swallowing Strategies Alternate solids and liquids   Results Reviewed with RN;PT/Family/Caregiver   Eating   Type of Assistance Needed Set-up / clean-up   Physical Assistance Level No physical assistance   Comment assist to open containers   Eating CARE Score 5   Swallow Assessment   Swallow Treatment Assessment Patient and clinician discussing some overt s/s that he has noticed more frequently.  Patient with overt cough during meals, pt reports \"It's hard to tell what's what.\"  He goes onto explain that when he eats he feels items becoming \"stuck\" in the upper most segment of his esophagus.  Patient includes, \"they told me this might happen during my immunotherapy.\"  He reports that he has to eat \"soft\" things and use liquid wash to clear upper chest.  Patient with cough on presentation, does not appear PO related.  Patient with clear vocal quality throughout.   Swallow Assessment Prognosis   Prognosis Good   Prognosis Considerations Co-morbidities   SLP Therapy Minutes   SLP Time In 1440   SLP Time Out 1525   SLP Total Time (minutes) 45   SLP Mode of " treatment - Individual (minutes) 45   SLP Mode of treatment - Concurrent (minutes) 0   SLP Mode of treatment - Group (minutes) 0   SLP Mode of treatment - Co-treat (minutes) 0   SLP Mode of Treatment - Total time(minutes) 45 minutes   SLP Cumulative Minutes 960   Therapy Time missed   Time missed? No

## 2024-05-22 NOTE — NURSING NOTE
Patient continues with left side weakness. Reports no pain. Wife for family training and able to perform transfers. Sitting upright in wheelchair for all meals. Reports throat feels better and no SOB this shift. Neb treatments continue. Continued stroke education provided. Will continue to monitor and follow plan of care.

## 2024-05-22 NOTE — PROGRESS NOTES
05/22/24 0859   Pain Assessment   Pain Assessment Tool 0-10   Pain Score 1   Pain Location/Orientation Orientation: Right;Location: Hip   Restrictions/Precautions   Precautions Bed/chair alarms;Cognitive;Fall Risk;Seizure;Supervision on toilet/commode  (R chest wall port for chemo)   Braces or Orthoses   (off shelf AFO LLE)   Cognition   Overall Cognitive Status Impaired   Arousal/Participation Alert;Responsive;Cooperative   Attention Attends with cues to redirect   Orientation Level Oriented X4   Memory Decreased short term memory;Decreased recall of precautions   Following Commands Follows one step commands with increased time or repetition   Sit to Lying   Type of Assistance Needed Physical assistance;Verbal cues   Physical Assistance Level 26%-50%   Comment mod A on mat table   Sit to Lying CARE Score 3   Lying to Sitting on Side of Bed   Type of Assistance Needed Physical assistance;Verbal cues   Physical Assistance Level 26%-50%   Comment mod A on mat table   Lying to Sitting on Side of Bed CARE Score 3   Sit to Stand   Type of Assistance Needed Physical assistance;Verbal cues   Physical Assistance Level 26%-50%   Comment min-mod A STS with PFRW   Sit to Stand CARE Score 3   Bed-Chair Transfer   Type of Assistance Needed Physical assistance;Verbal cues   Physical Assistance Level 26%-50%   Comment mod A SPT with PFRW wheelchair<>mat table   Chair/Bed-to-Chair Transfer CARE Score 3   Walk 10 Feet   Type of Assistance Needed Physical assistance;Verbal cues   Physical Assistance Level Total assistance   Comment min-mod A level and unlevel surfaces; second person for chair follow   Walk 10 Feet CARE Score 1   Walk 50 Feet with Two Turns   Type of Assistance Needed Physical assistance;Verbal cues   Physical Assistance Level Total assistance   Comment min-mod A level and unlevel surfaces; second person for chair follow   Walk 50 Feet with Two Turns CARE Score 1   Walking 10 Feet on Uneven Surfaces   Type of  Assistance Needed Physical assistance;Verbal cues   Physical Assistance Level Total assistance   Comment min-mod A level and unlevel surfaces; second person for chair follow   Walking 10 Feet on Uneven Surfaces CARE Score 1   Ambulation   Primary Mode of Locomotion Prior to Admission Walk   Distance Walked (feet) 90 ft  (74' 90')   Assist Device Platform;Roller Walker   Gait Pattern Inconsistant [place];Slow [place];Decreased foot clearance;L foot drag;L hemiparesis;Narrow ABHINAV;Step to;Decreased L stance;Improper weight shift   Limitations Noted In Balance;Coordination;Device Management;Endurance;Heel Strike;Posture;Safety;Sequencing;Strength   Provided Assistance with: Balance;Trunk Support;Weight Shift   Walk Assist Level Minimum Assist;Moderate Assist;Chair Follow   Findings min-mod A level and unlevel surfaces; second person for chair follow   Does the patient walk? 2. Yes   Wheel 50 Feet with Two Turns   Type of Assistance Needed Supervision   Comment S/mod I level and unlevel surfaces; increased time to complete due to decreased endurance   Wheel 50 Feet with Two Turns CARE Score 4   Wheel 150 Feet   Type of Assistance Needed Supervision   Comment S/mod I level and unlevel surfaces; increased time to complete due to decreased endurance   Wheel 150 Feet CARE Score 4   Wheelchair mobility   Does the patient use a wheelchair? 1. Yes   Type of Wheelchair Used 1. Manual   Method Right upper extremity;Right lower extremity   Assistance Provided For Locking Brakes;Obstacles;Remove Leg Rest;Replace Leg Rest;Remove armrests;Replace armrests   Distance Level Surface (feet) 204 ft  (204' 137')   Distance Wheeled 3% Grade 12 ft   Findings S/mod I level and unlevel surfaces; increased time to complete due to decreased endurance   Therapeutic Interventions   Strengthening supine ther ex; AROM RLE; AAROM LLE; improved mobility with hip and knee flexion, small improvements in knee extension   Balance gait and transfer training    Other wheelchair mobiltiy   Assessment   Treatment Assessment Patient agreeable to therapy session.  No pain reported.   Mild fatigue noted t/o therapy session.   Improved mobility from PM session yesterday.  Also noted improvement in LLE ROM in supine position for hip and knee flexion, and minimal knee extension.  Completed ther ex for general LE strengthening as well as improved LLE control; gait and transfer training focusing on sequence and technique for improved balance and safety with functional mobility using PFRW.  Propels wheelchair S/MOD I level and unelvel surfaces with increased time to complete.   Patient appropriate for concurrent therapy to increase motivation and encouragement among pts with similar deficits while completing therapy session.   PT Barriers   Physical Impairment Decreased strength;Decreased range of motion;Decreased endurance;Impaired balance;Decreased mobility;Decreased coordination;Decreased safety awareness;Impaired tone   Functional Limitation Wheelchair management;Walking;Transfers;Standing;Stair negotiation;Car transfers;Ramp negotiation   Plan   Treatment/Interventions Functional transfer training;LE strengthening/ROM;Therapeutic exercise;Bed mobility;Gait training  (wheelchair mobility)   Progress Slow progress, decreased activity tolerance   PT Therapy Minutes   PT Time In 0859   PT Time Out 1015   PT Total Time (minutes) 76   PT Mode of treatment - Individual (minutes) 61   PT Mode of treatment - Concurrent (minutes) 15   PT Mode of treatment - Group (minutes) 0   PT Mode of treatment - Co-treat (minutes) 0   PT Mode of Treatment - Total time(minutes) 76 minutes   PT Cumulative Minutes 1654   Therapy Time missed   Time missed? No

## 2024-05-22 NOTE — PROGRESS NOTES
PM&R PROGRESS NOTE:  Paras Pitts 68 y.o. male MRN: [id number]  Unit/Bed#: -02 Encounter: [id number]      Rehab Diagnosis: Impairment of mobility, safety, Activities of Daily Living (ADLs), and cognitive/communication skills due to Brain Dysfunction:  02.1  Non-Traumatic  Etiologic Diagnosis: New onset epilepsy/New onset seizure disorder in the setting new right frontal lobe vasogenic edema and radiation necrosis related to metastatic adenocarcinoma right frontal brain mass    HPI: Paras Pitts is a 68 y.o. 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  CT head showing right frontal lobe vasogenic edema with known underlying lesion.  MRI brain showing a right superior frontal mass resection and chemoradiation with unchanged linear enhancement along the surgical cavity compared to 3/26/2024 favoring radiation necrosis.  Patient seen by neurology.  Video EEG showing: Early in recording there was frequent or nearly continuous focal motor seizure and later there were two subclinical right fronto central electrographic seizures.  Patient placed on the following AED regimen: Keppra 2 g twice daily, Vimpat 200 mg twice daily, Onfi 5 mg twice daily.  Of note patient was significantly sedated with Ativan.   Patient to follow-up with epileptologist as an outpatient.  Patient was seen by radiation oncology regarding edema and  radiation necrosis and recommended to increase dexamethasone to 4 mg twice daily.  Patient is under the care of Dr. Langford for medical oncology and is to begin Avastin, chemotherapy radiation therapy for his recurrent mediastinal disease post rehabilitation.  After medical stabilization, patient found to have acute functional deficits from his mobility and self-care, therefore admitted to [facility] for acute inpatient rehabilitation.    SUBJECTIVE: Patient seen face to face in his room.  No recurrent major SOB overnight.  Cough and sore throat improving.  Patient looking forward to this discharge end of the week.      ASSESSMENT: Stable, progressing      PLAN:    Rehabilitation  Functional deficits: Left hemiparesis, left inattention, impaired balance, impaired mobility, self care    Continue current rehabilitation plan of care to maximize function.    I personally attended, reviewed, and discussed medical and functional updates in team conference today.  Please refer to advance care planning note for details.  Estimated Discharge: Friday 5/24/24 with home care RN, PT, OT, SLP  Wife will be assisting patient at home  Left foot drop: Patient will be sent home with an off-the-shelf left AFO.  Patient may be a candidate for a future custom AFO, due to fluctuating edema and returning function we will hold off on ordering during [facility] and recommend home care and outpatient therapy look into this to order in the future    Current Function:  Physical Therapy Occupational Therapy Speech Therapy   Weight Bearing Status: Weight Bearing as Tolerated  Transfers:  (min-mod A STS with PFRW, min-mod A sit pivot no AD, mod-max A SPT with PFRW)  Bed Mobility:  (min-mod up to mod-max A)  Amulation Distance (ft): 48 feet  Ambulation: Minimal Assistance, Moderate Assistance  Assistive Device for Ambulation:  (PFRW and L off shelf AFO)  Wheelchair Mobility Distance: 201 ft  Wheelchair Mobility: Supervision, Independent  Number of  Stairs: 3  Assistive Device for Stairs: Bilateral Hand Rails  Stair Assistance: Assist of 2 (mod -max A x 1 plus min A of another going up forward and down backwards)  Discharge Recommendations: Home with:  DC Home with:: 24 Hour Assisteance, Family Support, First Floor Setup, Home Physical Therapy   Eating: Supervision  Grooming: Supervision  Bathing: Moderate Assistance, Minimal Assistance  Bathing: Moderate Assistance, Minimal Assistance  Upper Body Dressing: Minimal Assistance  Lower Body Dressing: Moderate Assistance  Toileting: Moderate Assistance  Tub/Shower Transfer:  (TBA)  Toilet Transfer: Assist of 2  Cognition: Exceptions to WNL  Cognition: Decreased Attention, Decreased Safety, Decreased Memory  Orientation: Person, Place, Time, Situation   Mode of Communication: Verbal  Cognition: Exceptions to WNL  Cognition: Decreased Memory, Decreased Executive Functions, Decreased Attention  Orientation: Person, Place, Time, Situation  Swallowing: Within Defined Limits  Diet Recommendations: Regular Diet, Thin  Discharge Recommendations: Home with:  DC Home with:: Family Support, Outpatient Speech Therapy       DVT prophylaxis  Continue Lovenox 40 mg SQ daily    Bladder plan  Continent    Bowel plan  Continent      * Seizure disorder (HCC)  Assessment & Plan  New onset seizures presenting for 2024  CT head showing right frontal lobe vasogenic edema with known underlying lesion.    MRI brain showing a right superior frontal mass resection and chemoradiation with unchanged linear enhancement along the surgical cavity compared to 3/26/2024 favoring radiation necrosis.    Patient seen by neurology.    Video EEG showing: Early in recording there was frequent or nearly continuous focal motor seizure and later there were two subclinical right fronto central electrographic seizures.    Patient placed on the following AED regimen: Keppra 2 g twice daily, Vimpat 200 mg twice daily, Onfi 5 mg twice daily.  *Of note patient  was significantly sedated with Ativan.  (Patient also on Mirapex 0.25mg TID presumable for tremors or RLS)  Continue seizure precautions     During ARC, no seizure-like activity thus far.  Patient to follow-up with neurology-epileptologist as an outpatient.    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  Dexamethasone taking 4mg qday per prior providers recently for vasogenic edema (appears to be on 2mg BID prior at home based on chart review)   Patient is under the care of Dr. Langford for medical oncology and is to begin Avastin, chemotherapy radiation therapy for his recurrent mediastinal disease post rehabilitation.  Avastin Infusion 5/15/2024. Increased fatigue 5/17 which pt states is expected .  5/20 improved  Infusion center to set up chemo treatment   CBC, CMP, UA on Monday 5/13   Essentially unremarkable  Recent increased cough - comgmt per IM > improving  Covid/flu/RSV negative 5/13  CXR 5/14 - Right basilar linear opacity, likely atelectasis. Right upper lobectomy.  Encourage IS, flutter      Left hemiparesis (HCC)  Assessment & Plan  Stable   Worsening left hemiparesis likely related to recent radiation necrosis and vasogenic edema, seizures -and ARC program improving  Decadron for hx of vasogenic edema  Continue acute rehabilitation program to maximize function      Brain mass  Assessment & Plan  Patient with known metastatsis to right frontal lobe s/p right frontal craniotomy in March 2023  H-O recommended increasing dex to 8mg BID x2 days and then will resume chronic Dexamethasone 4mg qday per Elaine  Rahul H-0  Has been on Dexamethasone taking 4mg qday per prior providers recently for vasogenic edema (appears to be on 2mg BID prior at home based on chart review)   Patient status post chemotherapy radiation SRT, immunotherapy previously  Status post Avastin infusion on 5/15, postinfusion had fatigue which has resolved  Follows with Dr. Langford    Shortness of breath  Assessment & Plan  Transient Shortness of breath starting 5/20/2024.  Improved 5/21/24 & 5/22/24  Patient recommended CT chest with PE protocol to rule out PE,  however patient is currently refusing testing today, despite both myself and Dr. Andrade educating.  Monitor saturations closely  Continue DuoNebs  Monitor shortness of breath -patient may be agreeable if symptoms continue      Pancytopenia (HCC)  Assessment & Plan  Post Avastin  WBC 2.31 K  Plt 101 K   Monitor     Cognitive impairment  Assessment & Plan  Cog stable 5/17; no recent safety concerns reported  Did have fall 5/11 when he tried to reach down to  urinal - MD [email or web address]'isidra without signs of injury and patient continues to deny injury from fall  - High fall precautions with frequent rounding - if increased safety concerns consider q15 checks or 1:1  - MOCA completed 5/9 - obtain results   - Increased risk of delirium - monitor   - Patient/family/CG teaching   - Optimal Sleep/wake cycle management  - Limit sedating/deliriogenic meds when possible  - Optimal medical, mood, and pain management   - Skilled therapies as outlined   - Compensatory strategies   - Optimize oversight after discharge  - OP follow-up with PCP and neuro      Cough  Assessment & Plan  Cough and sore throat -improved sore throat with Magic mouthwash  Strep test negative  Continue Duonebs, Magic Mouthwash     Comgmt with IM team  Monitor lung exam, vitals with and without activity  Encourage incentive spirometry/flutter valve   Pt afebrile no leukocytosis; saturating well on RA   Covid/Flu/RSV PCR negative 5/13  CXR  "5/14 - Right basilar linear opacity, likely atelectasis. Right upper lobectomy.  Mucinex BID and Robitussin DM PRN per IM     Bilateral edema of lower extremity  Assessment & Plan  Stable; if worsens or becomes painful obtain BLE venous doppler   1+ Pitting edema bilaterally likely secondary to increased dependent position.    On Lovenox for DVT prophylaxis   TEDS daily  Elevate limbs more    Sore throat  Assessment & Plan  Improved; Covid/RSV/flu negative 5/13   PRN Robitussin ordered, lozenges, magic mouthwash  See section for cough    Adjustment disorder  Assessment & Plan  Mood acceptable   Supportive counseling  Consider neuropsychology consultation if needed     Pseudobulbar affect  Assessment & Plan  Mood is much improved without emotional lability noted (week of 5/6/2024)  On admission at times was tearful and emotionally labile  May benefit from a future SSRI if continues      Constipation  Assessment & Plan  Stooling adequately   Miralax qday  Declines supp  If needed add senna      History of CVA (cerebrovascular accident)  Assessment & Plan  Patient with ischemic CVA in 2011  Resultant mild left hemiparesis    Hypercholesterolemia  Assessment & Plan  Continue Lipitor 40 mg daily (home dose)    Essential hypertension  Assessment & Plan  Continue Norvasc 10 mg daily and losartan 50 mg daily (home dose)  Monitor BP and heart rate during rest and with activity  Internal medicine managing    Overweight (BMI 25.0-29.9)  Assessment & Plan  Dietary and lifestyle changes when able          Appreciate IM consultants medical co-management.  Labs, medications, and imaging personally reviewed.      ROS:  A ten point review of systems was completed on 05/22/24 and pertinent positives are listed in subjective section. All other systems reviewed were negative.       OBJECTIVE:   /84 (BP Location: Right arm)   Pulse 82   Temp 98 °F (36.7 °C) (Temporal)   Resp 16   Ht 5' 9.02\" (1.753 m)   Wt 95.4 kg (210 lb 5.1 " oz)   SpO2 93%   BMI 31.04 kg/m²       Physical Exam  Vitals and nursing note reviewed.   Constitutional:       General: He is not in acute distress.  HENT:      Head: Normocephalic and atraumatic.      Nose: Nose normal.      Mouth/Throat:      Mouth: Mucous membranes are moist.   Eyes:      Conjunctiva/sclera: Conjunctivae normal.   Cardiovascular:      Rate and Rhythm: Normal rate and regular rhythm.      Pulses: Normal pulses.   Pulmonary:      Effort: Pulmonary effort is normal.      Breath sounds: No wheezing or rales.      Comments: Coarse BS unchanged  Abdominal:      General: Bowel sounds are normal. There is no distension.      Palpations: Abdomen is soft.      Tenderness: There is no abdominal tenderness.   Musculoskeletal:         General: No swelling.      Cervical back: Neck supple.   Skin:     General: Skin is warm.   Neurological:      Mental Status: He is alert and oriented to person, place, and time.      Motor: Weakness (left hemiparesis) present.   Psychiatric:         Mood and Affect: Mood normal.          Lab Results   Component Value Date    WBC 2.31 (L) 05/20/2024    HGB 11.5 (L) 05/20/2024    HCT 35.0 (L) 05/20/2024    MCV 97 05/20/2024     (L) 05/20/2024     Lab Results   Component Value Date    SODIUM 140 05/20/2024    K 3.9 05/20/2024     05/20/2024    CO2 32 05/20/2024    BUN 12 05/20/2024    CREATININE 0.63 05/20/2024    GLUC 143 (H) 05/20/2024    CALCIUM 8.5 05/20/2024     Lab Results   Component Value Date    INR 0.92 04/20/2024    INR 0.98 08/30/2023    INR 1.06 03/24/2023    PROTIME 12.6 04/20/2024    PROTIME 13.2 08/30/2023    PROTIME 14.0 03/24/2023           Current Facility-Administered Medications:     acetaminophen (TYLENOL) tablet 975 mg, 975 mg, Oral, Q8H PRN, Jenise Dawson PA-C, 975 mg at 05/21/24 0521    albuterol (PROVENTIL HFA,VENTOLIN HFA) inhaler 2 puff, 2 puff, Inhalation, Q4H PRN, Omer Andrade MD    alteplase (CATHFLO) injection 2 mg, 2 mg,  Intracatheter, Q1MIN PRN, Collin Langford MD    aluminum-magnesium hydroxide-simethicone (MAALOX) oral suspension 30 mL, 30 mL, Oral, Q6H PRN, Jenise Dawson PA-C    amLODIPine (NORVASC) tablet 10 mg, 10 mg, Oral, Daily, Jenise Dawson PA-C, 10 mg at 05/21/24 0831    atorvastatin (LIPITOR) tablet 40 mg, 40 mg, Oral, After Dinner, Jenise Dawson PA-C, 40 mg at 05/21/24 1731    bisacodyl (DULCOLAX) rectal suppository 10 mg, 10 mg, Rectal, Daily PRN, Omer Andrade MD    cloBAZam (ONFI) tablet 5 mg, 5 mg, Oral, BID, Jenise Dawson PA-C, 5 mg at 05/21/24 1731    dexamethasone (DECADRON) tablet 4 mg, 4 mg, Oral, Daily, Layton Yoder MD, 4 mg at 05/21/24 0831    dextromethorphan-guaiFENesin (ROBITUSSIN DM) oral syrup 10 mL, 10 mL, Oral, Q4H PRN, Corina Arita MD, 10 mL at 05/20/24 2059    diphenhydramine, lidocaine, Al/Mg hydroxide, simethicone (Magic Mouthwash) oral solution 10 mL, 10 mL, Swish & Swallow, Q4H PRN, Omer Andrade MD, 10 mL at 05/20/24 2136    docusate sodium (COLACE) capsule 100 mg, 100 mg, Oral, BID PRN, Corina Arita MD    enoxaparin (LOVENOX) subcutaneous injection 40 mg, 40 mg, Subcutaneous, Daily, Corina Arita MD, 40 mg at 05/21/24 0830    guaiFENesin (MUCINEX) 12 hr tablet 600 mg, 600 mg, Oral, Q12H LEN, Omer Andrade MD, 600 mg at 05/21/24 2159    ipratropium-albuterol (DUO-NEB) 0.5-2.5 mg/3 mL inhalation solution 3 mL, 3 mL, Nebulization, TID, Corina Arita MD, 3 mL at 05/21/24 2159    lacosamide (VIMPAT) tablet 200 mg, 200 mg, Oral, Q12H LEN, Jenise Dawson PA-C, 200 mg at 05/21/24 2159    levETIRAcetam (KEPPRA) tablet 2,000 mg, 2,000 mg, Oral, Q12H LEN, Jenise Dawson PA-C, 2,000 mg at 05/21/24 2059    losartan (COZAAR) tablet 50 mg, 50 mg, Oral, Daily, Jenise Dawson PA-C, 50 mg at 05/21/24 0832    ondansetron (ZOFRAN-ODT) dispersible tablet 4 mg, 4 mg, Oral, Q6H PRN, Jenise Dawson PA-C     pantoprazole (PROTONIX) EC tablet 40 mg, 40 mg, Oral, Early Morning, Jenise Dawson PA-C, 40 mg at 05/22/24 0620    polyethylene glycol (MIRALAX) packet 17 g, 17 g, Oral, Daily PRN, Corina Arita MD, 17 g at 05/14/24 0923    polyethylene glycol (MIRALAX) packet 17 g, 17 g, Oral, Daily, Layton Yoder MD, 17 g at 05/18/24 0808    pramipexole (MIRAPEX) tablet 0.25 mg, 0.25 mg, Oral, TID, Jenise Dawson PA-C, 0.25 mg at 05/21/24 6649    Past Medical History:   Diagnosis Date    Brain compression (HCC) 03/20/2023    Brain mass 03/20/2023    Cancer (HCC) 2001    Receint lung and brain lesions and prostate cancer in 2001    Cerebral edema (HCC) 03/20/2023    Hypertension     Prostate cancer (HCC) 2001    Rectal bleeding     Stroke (HCC)     2011         Patient Active Problem List    Diagnosis Date Noted    Seizure disorder (HCC) 05/02/2024    Metastatic adenocarcinoma (HCC) 2023    Left hemiparesis (HCC) 05/02/2024    Brain mass 03/20/2023    Shortness of breath 05/21/2024    Pancytopenia (HCC) 05/20/2024    Cough 05/14/2024    Cognitive impairment 05/14/2024    Bilateral edema of lower extremity 05/10/2024    Sore throat 05/08/2024    Frontal mass of brain 05/02/2024    Pseudobulbar affect 05/02/2024    Adjustment disorder 05/02/2024    Constipation 04/25/2024    Seizure-like activity (HCC) 04/23/2024    Generalized weakness 04/20/2024    Tremors of nervous system 04/20/2024    History of CVA (cerebrovascular accident) 06/22/2023    Chemotherapy-induced neutropenia (HCC) 05/03/2023    Encounter for central line care 05/01/2023    Carcinoma of right lung (HCC) 04/13/2023    Lung nodule 03/21/2023    Hypercholesterolemia 09/20/2021    Essential hypertension 09/11/2020    Annual physical exam 09/11/2020    Negative depression screening 02/24/2020    Overweight (BMI 25.0-29.9) 02/24/2020          Corina Arita MD    I have spent a total time of 60 minutes on 05/22/24 in caring for this  patient including Impressions, Documenting in the medical record, Communicating with other healthcare professionals , and weekly team conference .      ** Please Note:  voice to text software may have been used in the creation of this document. Although proof errors in transcription or interpretation are a potential of such software**

## 2024-05-22 NOTE — PROGRESS NOTES
05/22/24 1330   Pain Assessment   Pain Assessment Tool 0-10   Pain Score No Pain   Restrictions/Precautions   Precautions Bed/chair alarms;Cognitive;Fall Risk;Multiple lines;Seizure;Supervision on toilet/commode  (R chest wall chemo port)   Weight Bearing Restrictions No   ROM Restrictions No   Tub/Shower Transfer   Findings pt, pt's wife, and OT discussed and practiced shower transfer to simulate pt's home enviornment: pt's wife reports small shower stall at home with small ledge to step over and wants a shower chair, OT showed pt and pt's wife in-shower chair that will fit within shower; shower transfer then simulated to mimic pt's home environment with pt requiring modA for stand pivot, OT recommended wheeling pt to edge of shower, placing pt's feet in shower, and standing while using grab bars and stand pivoting, then drying shower entirely prior to exiting; pt and pt's wife reported feeling comfortable and confident in this shower method; OT and pt will practice again prior to pt's d/c   Sit to Stand   Type of Assistance Needed Physical assistance   Physical Assistance Level 26%-50%   Sit to Stand CARE Score 3   Toileting Hygiene   Type of Assistance Needed Physical assistance   Physical Assistance Level 51%-75%   Comment assist for clothing management   Toileting Hygiene CARE Score 2   Toileting   Able to Pull Clothing down no, up no.   Manage Hygiene Bladder   Limitations Noted In Balance;Coordination;ROM;Safety;UE Strength;LE Strength   Adaptive Equipment Grab Bar   Toilet Transfer   Type of Assistance Needed Physical assistance   Physical Assistance Level 51%-75%   Comment physical assist to move LLE during pivot; pt's wife completed transfer with OT supervision   Toilet Transfer CARE Score 2   Toilet Transfer   Surface Assessed Raised Toilet   Transfer Technique Stand Pivot   Limitations Noted In Balance;Endurance;Problem Solving;ROM;Safety;UE Strength;LE Strength   Adaptive Equipment Grab Bar    Cognition   Overall Cognitive Status Impaired   Arousal/Participation Alert;Responsive;Cooperative   Attention Attends with cues to redirect   Orientation Level Oriented X4   Memory Decreased short term memory;Decreased recall of precautions   Following Commands Follows one step commands with increased time or repetition   Assessment   Treatment Assessment Pt agreeable to OT session this PM. Received sitting upright in w/c. Pt's wife present for training and completed toileting and transfers and simulated shower transfer; details in respective sections. Pt and pt's wife report feeling comfortable with pt's current transfer LOF. Pt's wife to obtain shower chair prior to pt's d/c later this week. Pt's wife also reported feeling comfortable with ADL status and routine after lengthy discussion with OT, who will complete filming of ADL methods to show wife as she is not able to attend training again prior to d/c. Pt's barriers to d/c include decreased strength throughout but especially L side s/p previous CVA, decreased balance, impaired coordination L side, impaired safety awareness, problem solving, and attention, and decreased activity tolerance; all affect independence in self care and fxl transfers. Pt would benefit from continued skilled OT services in order to address listed barriers and prepare for safe d/c.   Prognosis Good   Problem List Decreased range of motion;Decreased strength;Decreased endurance;Impaired balance;Decreased coordination;Decreased cognition;Impaired judgement;Decreased safety awareness   Plan   Treatment/Interventions ADL retraining;Functional transfer training;LE strengthening/ROM;Therapeutic exercise;Endurance training;Cognitive reorientation;Patient/family training;Equipment eval/education;Compensatory technique education;OT   Progress Progressing toward goals   OT Therapy Minutes   OT Time In 1330   OT Time Out 1430   OT Total Time (minutes) 60   OT Mode of treatment - Individual  (minutes) 60

## 2024-05-22 NOTE — PROGRESS NOTES
"Progress Note - Paras Pitts 68 y.o. male MRN: [id number]    Unit/Bed#: [facility] 213-02 Encounter: [id number]        Subjective:   Patient seen and examined at bedside after reviewing the chart and discussing the case with the caring staff.      Patient examined at bedside.  Patient has no acute symptoms.     Physical Exam   Vitals: Blood pressure 142/84, pulse 82, temperature 98 °F (36.7 °C), temperature source Temporal, resp. rate 16, height 5' 9.02\" (1.753 m), weight 95.4 kg (210 lb 5.1 oz), SpO2 93%.,Body mass index is 31.04 kg/m².  Constitutional: Patient in no acute distress.  HEENT: PERR, EOMI, MMM.   Cardiovascular: Normal rate and regular rhythm.    Pulmonary/Chest: Normal effort and breath sounds normal.   Abdomen: Soft, + BS, NT.    Assessment/Plan:  Paras Pitts is a(n) 68 y.o. male with tremors of the nervous system.     Cardiac Hx w/ HTN, HLD, hx of CVA.  Continue amlodipine 10 mg daily, Losartan 50 mg daily, atorvastatin 40 mg daily.  Type 2 diabetes mellitus.  Hgb A1c 6.7% on 5/3/24.  Carb controlled diet.   GERD.  Continue Protonix 40 mg daily.  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  Stage [place].  S/p robotic converted to right thoracotomy and right upper lobectomy on 9/1/2023. S/p bronchoscopy with [facility] at [facility] on 4/16/2024.  Continue Decadron 4 mg daily.  Patient completed CT[facility]SIM at [facility] 4/29.  Received Avastin infusion 5/15.  Acute pharyngitis.  Improving.  Throat culture negative 5/20/24.  Magic mouthwash and lozenges as needed.  Cough/congestion/shortness of breath.  Improving.  Patient started on DuoNebs 3 times daily, albuterol inhaler as needed, Mucinex twice daily and Robitussin as needed.  COVID/RSV/flu negative 5/13.  CXR was negative for pneumonia 5/14/2024. Patient refused CTA to rule out PE on 5/21/2024.   Pain and bowel regimen. As per physiatry.  Tremors of the nervous system.  Continue Keppra 2000 mg twice daily, Vimpat 200 mg twice daily, clobazam 5 mg twice " daily.  Follow up in 4 weeks with epilepsy attending.  Patient receiving intensive PT OT as per physiatry.     Anticipated date of discharge.  Friday, 5/24/2024    The patient was discussed with Dr. Andrade and he is in agreement with the above note.

## 2024-05-22 NOTE — PLAN OF CARE
Problem: PAIN - ADULT  Goal: Verbalizes/displays adequate comfort level or baseline comfort level  Description: Interventions:  - Encourage patient to monitor pain and request assistance  - Assess pain using appropriate pain scale  - Administer analgesics based on type and severity of pain and evaluate response  - Implement non-pharmacological measures as appropriate and evaluate response  - Consider cultural and social influences on pain and pain management  - Notify physician/advanced practitioner if interventions unsuccessful or patient reports new pain  Outcome: Progressing     Problem: INFECTION - ADULT  Goal: Absence or prevention of progression during hospitalization  Description: INTERVENTIONS:  - Assess and monitor for signs and symptoms of infection  - Monitor lab/diagnostic results  - Monitor all insertion sites, i.e. indwelling lines, tubes, and drains  - Monitor endotracheal if appropriate and nasal secretions for changes in amount and color  - [place] appropriate cooling/warming therapies per order  - Administer medications as ordered  - Instruct and encourage patient and family to use good hand hygiene technique  - Identify and instruct in appropriate isolation precautions for identified infection/condition  Outcome: Progressing     Problem: NEUROSENSORY - ADULT  Goal: Achieves stable or improved neurological status  Description: INTERVENTIONS  - Monitor and report changes in neurological status  - Monitor vital signs such as temperature, blood pressure, glucose, and any other labs ordered   - Initiate measures to prevent increased intracranial pressure  - Monitor for seizure activity and implement precautions if appropriate      Outcome: Progressing     Problem: Nutrition/Hydration-ADULT  Goal: Nutrient/Hydration intake appropriate for improving, restoring or maintaining nutritional needs  Description: Monitor and assess patient's nutrition/hydration status for malnutrition. Collaborate with  interdisciplinary team and initiate plan and interventions as ordered.  Monitor patient's weight and dietary intake as ordered or per policy. Utilize nutrition screening tool and intervene as necessary. Determine patient's food preferences and provide high-protein, high-caloric foods as appropriate.     INTERVENTIONS:  - Monitor oral intake, urinary output, labs, and treatment plans  - Assess nutrition and hydration status and recommend course of action  - Evaluate amount of meals eaten  - Assist patient with eating if necessary   - Allow adequate time for meals  - Recommend/ encourage appropriate diets, oral nutritional supplements, and vitamin/mineral supplements  - Order, calculate, and assess calorie counts as needed  - Recommend, monitor, and adjust tube feedings and TPN/PPN based on assessed needs  - Assess need for intravenous fluids  - Provide specific nutrition/hydration education as appropriate  - Include patient/family/caregiver in decisions related to nutrition  Outcome: Progressing

## 2024-05-23 LAB
BACTERIA UR QL AUTO: ABNORMAL /HPF
BILIRUB UR QL STRIP: NEGATIVE
CLARITY UR: CLEAR
COLOR UR: YELLOW
GLUCOSE UR STRIP-MCNC: NEGATIVE MG/DL
HGB UR QL STRIP.AUTO: NEGATIVE
KETONES UR STRIP-MCNC: NEGATIVE MG/DL
LEUKOCYTE ESTERASE UR QL STRIP: NEGATIVE
NITRITE UR QL STRIP: NEGATIVE
NON-SQ EPI CELLS URNS QL MICRO: ABNORMAL /HPF
PH UR STRIP.AUTO: 6 [PH]
PROT UR STRIP-MCNC: NEGATIVE MG/DL
RBC #/AREA URNS AUTO: ABNORMAL /HPF
SP GR UR STRIP.AUTO: 1.01
UROBILINOGEN UR QL STRIP.AUTO: 0.2 E.U./DL
WBC #/AREA URNS AUTO: ABNORMAL /HPF

## 2024-05-23 PROCEDURE — 97110 THERAPEUTIC EXERCISES: CPT

## 2024-05-23 PROCEDURE — 81001 URINALYSIS AUTO W/SCOPE: CPT

## 2024-05-23 PROCEDURE — 97535 SELF CARE MNGMENT TRAINING: CPT

## 2024-05-23 PROCEDURE — 97116 GAIT TRAINING THERAPY: CPT

## 2024-05-23 PROCEDURE — 92507 TX SP LANG VOICE COMM INDIV: CPT

## 2024-05-23 PROCEDURE — 97530 THERAPEUTIC ACTIVITIES: CPT

## 2024-05-23 PROCEDURE — 99233 SBSQ HOSP IP/OBS HIGH 50: CPT | Performed by: PHYSICAL MEDICINE & REHABILITATION

## 2024-05-23 PROCEDURE — 97542 WHEELCHAIR MNGMENT TRAINING: CPT

## 2024-05-23 RX ORDER — LOSARTAN POTASSIUM 50 MG/1
50 TABLET ORAL DAILY
Qty: 30 TABLET | Refills: 0 | Status: SHIPPED | OUTPATIENT
Start: 2024-05-23

## 2024-05-23 RX ORDER — LEVETIRACETAM 1000 MG/1
2000 TABLET ORAL EVERY 12 HOURS SCHEDULED
Qty: 120 TABLET | Refills: 0 | Status: SHIPPED | OUTPATIENT
Start: 2024-05-23 | End: 2024-06-22

## 2024-05-23 RX ORDER — IPRATROPIUM BROMIDE AND ALBUTEROL SULFATE 2.5; .5 MG/3ML; MG/3ML
3 SOLUTION RESPIRATORY (INHALATION) 3 TIMES DAILY
Qty: 270 ML | Refills: 0 | Status: SHIPPED | OUTPATIENT
Start: 2024-05-23

## 2024-05-23 RX ORDER — ATORVASTATIN CALCIUM 40 MG/1
40 TABLET, FILM COATED ORAL
Qty: 30 TABLET | Refills: 0 | Status: SHIPPED | OUTPATIENT
Start: 2024-05-23

## 2024-05-23 RX ORDER — CLOBAZAM 10 MG/1
5 TABLET ORAL 2 TIMES DAILY
Qty: 10 TABLET | Refills: 0 | Status: SHIPPED | OUTPATIENT
Start: 2024-05-23 | End: 2024-06-02

## 2024-05-23 RX ORDER — DEXAMETHASONE 4 MG/1
4 TABLET ORAL DAILY
Qty: 30 TABLET | Refills: 0 | Status: SHIPPED | OUTPATIENT
Start: 2024-05-23

## 2024-05-23 RX ORDER — PRAMIPEXOLE DIHYDROCHLORIDE 0.25 MG/1
0.25 TABLET ORAL 3 TIMES DAILY
Qty: 90 TABLET | Refills: 0 | Status: SHIPPED | OUTPATIENT
Start: 2024-05-23

## 2024-05-23 RX ORDER — LACOSAMIDE 200 MG/1
200 TABLET ORAL EVERY 12 HOURS SCHEDULED
Qty: 20 TABLET | Refills: 0 | Status: SHIPPED | OUTPATIENT
Start: 2024-05-23 | End: 2024-06-02

## 2024-05-23 RX ORDER — GUAIFENESIN 600 MG/1
600 TABLET, EXTENDED RELEASE ORAL EVERY 12 HOURS SCHEDULED
Qty: 20 TABLET | Refills: 0 | Status: SHIPPED | OUTPATIENT
Start: 2024-05-24 | End: 2024-06-03

## 2024-05-23 RX ORDER — PANTOPRAZOLE SODIUM 40 MG/1
40 TABLET, DELAYED RELEASE ORAL DAILY
Qty: 30 TABLET | Refills: 0 | Status: SHIPPED | OUTPATIENT
Start: 2024-05-23

## 2024-05-23 RX ORDER — AMLODIPINE BESYLATE 10 MG/1
10 TABLET ORAL DAILY
Qty: 30 TABLET | Refills: 0 | Status: SHIPPED | OUTPATIENT
Start: 2024-05-23

## 2024-05-23 RX ADMIN — IPRATROPIUM BROMIDE AND ALBUTEROL SULFATE 3 ML: 2.5; .5 SOLUTION RESPIRATORY (INHALATION) at 15:36

## 2024-05-23 RX ADMIN — DIPHENHYDRAMINE HYDROCHLORIDE AND LIDOCAINE HYDROCHLORIDE AND ALUMINUM HYDROXIDE AND MAGNESIUM HYDRO 10 ML: KIT at 21:16

## 2024-05-23 RX ADMIN — CLOBAZAM 5 MG: 10 TABLET ORAL at 10:06

## 2024-05-23 RX ADMIN — IPRATROPIUM BROMIDE AND ALBUTEROL SULFATE 3 ML: 2.5; .5 SOLUTION RESPIRATORY (INHALATION) at 20:24

## 2024-05-23 RX ADMIN — PRAMIPEXOLE DIHYDROCHLORIDE 0.25 MG: 0.12 TABLET ORAL at 20:23

## 2024-05-23 RX ADMIN — GUAIFENESIN AND DEXTROMETHORPHAN 10 ML: 100; 10 SYRUP ORAL at 21:16

## 2024-05-23 RX ADMIN — GUAIFENESIN 600 MG: 600 TABLET ORAL at 10:06

## 2024-05-23 RX ADMIN — ATORVASTATIN CALCIUM 40 MG: 40 TABLET, FILM COATED ORAL at 17:38

## 2024-05-23 RX ADMIN — DEXAMETHASONE 4 MG: 4 TABLET ORAL at 10:06

## 2024-05-23 RX ADMIN — PRAMIPEXOLE DIHYDROCHLORIDE 0.25 MG: 0.12 TABLET ORAL at 15:50

## 2024-05-23 RX ADMIN — LOSARTAN POTASSIUM 50 MG: 50 TABLET, FILM COATED ORAL at 10:06

## 2024-05-23 RX ADMIN — LEVETIRACETAM 2000 MG: 500 TABLET, FILM COATED ORAL at 10:07

## 2024-05-23 RX ADMIN — CLOBAZAM 5 MG: 10 TABLET ORAL at 17:38

## 2024-05-23 RX ADMIN — PRAMIPEXOLE DIHYDROCHLORIDE 0.25 MG: 0.12 TABLET ORAL at 10:06

## 2024-05-23 RX ADMIN — PANTOPRAZOLE SODIUM 40 MG: 40 TABLET, DELAYED RELEASE ORAL at 05:22

## 2024-05-23 RX ADMIN — ENOXAPARIN SODIUM 40 MG: 40 INJECTION SUBCUTANEOUS at 10:06

## 2024-05-23 RX ADMIN — LACOSAMIDE 200 MG: 150 TABLET ORAL at 10:06

## 2024-05-23 RX ADMIN — LEVETIRACETAM 2000 MG: 500 TABLET, FILM COATED ORAL at 20:24

## 2024-05-23 RX ADMIN — GUAIFENESIN 600 MG: 600 TABLET ORAL at 20:24

## 2024-05-23 RX ADMIN — AMLODIPINE BESYLATE 10 MG: 10 TABLET ORAL at 10:06

## 2024-05-23 RX ADMIN — LACOSAMIDE 200 MG: 150 TABLET ORAL at 20:24

## 2024-05-23 NOTE — PROGRESS NOTES
PM&R PROGRESS NOTE:  Paras Pitts 68 y.o. male MRN: [id number]  Unit/Bed#: -02 Encounter: [id number]      Rehab Diagnosis: Impairment of mobility, safety, Activities of Daily Living (ADLs), and cognitive/communication skills due to Brain Dysfunction:  02.1  Non-Traumatic  Etiologic Diagnosis: New onset epilepsy/New onset seizure disorder in the setting new right frontal lobe vasogenic edema and radiation necrosis related to metastatic adenocarcinoma right frontal brain mass    HPI: Paras Pitts is a 68 y.o. 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  CT head showing right frontal lobe vasogenic edema with known underlying lesion.  MRI brain showing a right superior frontal mass resection and chemoradiation with unchanged linear enhancement along the surgical cavity compared to 3/26/2024 favoring radiation necrosis.  Patient seen by neurology.  Video EEG showing: Early in recording there was frequent or nearly continuous focal motor seizure and later there were two subclinical right fronto central electrographic seizures.  Patient placed on the following AED regimen: Keppra 2 g twice daily, Vimpat 200 mg twice daily, Onfi 5 mg twice daily.  Of note patient was significantly sedated with Ativan.   Patient to follow-up with epileptologist as an outpatient.  Patient was seen by radiation oncology regarding edema and  radiation necrosis and recommended to increase dexamethasone to 4 mg twice daily.  Patient is under the care of Dr. Langford for medical oncology and is to begin Avastin, chemotherapy radiation therapy for his recurrent mediastinal disease post rehabilitation.  After medical stabilization, patient found to have acute functional deficits from his mobility and self-care, therefore admitted to [facility] for acute inpatient rehabilitation.    SUBJECTIVE: Patient seen face-to-face today in physical therapy. Seen ambulating with platform rolling walker without any overt increased shortness of breath.  Cough is improving.  Family training yesterday with his wife went well.  Preparing for discharge tomorrow.    ASSESSMENT: Stable, progressing      PLAN:    Rehabilitation  Functional deficits: Left hemiparesis, left inattention, impaired balance, impaired mobility, self care    Continue current rehabilitation plan of care to maximize function.    Estimated Discharge: Friday 5/24/24 with home care RN, PT, OT, SLP  Wife will be assisting patient at home  Left foot drop: Patient will be sent home with an off-the-shelf left AFO.  Patient may be a candidate for a future custom AFO, due to fluctuating edema and returning function we will hold off on ordering during ARC and recommend home care and outpatient therapy look into this to order in the future    Current Function:  Physical Therapy Occupational Therapy Speech Therapy   Weight Bearing Status: Weight Bearing as Tolerated  Transfers:  (min-mod A STS with PFRW, min-mod A sit pivot no AD, mod-max A SPT with PFRW)  Bed Mobility:  (min-mod up to mod-max A)  Amulation Distance (ft): 48 feet  Ambulation: Minimal Assistance, Moderate Assistance  Assistive Device for Ambulation:  (PFRW and L off shelf AFO)  Wheelchair Mobility Distance: 201 ft  Wheelchair Mobility: Supervision, Independent  Number of Stairs: 3  Assistive Device for Stairs: Bilateral Hand Rails  Stair Assistance:  Assist of 2 (mod -max A x 1 plus min A of another going up forward and down backwards)  Discharge Recommendations: Home with:  DC Home with:: 24 Hour Assisteance, Family Support, First Floor Setup, Home Physical Therapy   Eating: Supervision  Grooming: Independent  Bathing: Minimal Assistance  Bathing: Minimal Assistance  Upper Body Dressing: Minimal Assistance  Lower Body Dressing: Minimal Assistance, Moderate Assistance  Toileting: Minimal Assistance, Moderate Assistance  Tub/Shower Transfer:  (TBA)  Toilet Transfer: Minimal Assistance, Moderate Assistance  Cognition: Exceptions to WNL  Cognition: Decreased Memory, Decreased Attention, Decreased Comprehension, Decreased Safety  Orientation: Person, Place, Time, Situation   Mode of Communication: Verbal  Cognition: Exceptions to WNL  Cognition: Decreased Memory, Decreased Executive Functions, Decreased Attention  Orientation: Person, Place, Time, Situation  Swallowing: Within Defined Limits  Diet Recommendations: Regular Diet, Thin  Discharge Recommendations: Home with:  DC Home with:: Family Support, Outpatient Speech Therapy       DVT prophylaxis  Continue Lovenox 40 mg SQ daily    Bladder plan  Continent    Bowel plan  Continent      * Seizure disorder (HCC)  Assessment & Plan  New onset seizures presenting for 2024  CT head showing right frontal lobe vasogenic edema with known underlying lesion.    MRI brain showing a right superior frontal mass resection and chemoradiation with unchanged linear enhancement along the surgical cavity compared to 3/26/2024 favoring radiation necrosis.    Patient seen by neurology.    Video EEG showing: Early in recording there was frequent or nearly continuous focal motor seizure and later there were two subclinical right fronto central electrographic seizures.    Patient placed on the following AED regimen: Keppra 2 g twice daily, Vimpat 200 mg twice daily, Onfi 5 mg twice daily.  *Of note patient was significantly sedated with  Ativan.  (Patient also on Mirapex 0.25mg TID presumable for tremors or RLS)  Continue seizure precautions     During ARC, no seizure-like activity thus far.  Patient to follow-up with neurology-epileptologist as an outpatient.    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  Dexamethasone taking 4mg qday per prior providers recently for vasogenic edema (appears to be on 2mg BID prior at home based on chart review)   Patient is under the care of Dr. Langford for medical oncology and is to begin Avastin, chemotherapy radiation therapy for his recurrent mediastinal disease post rehabilitation.  Avastin Infusion 5/15/2024. Increased fatigue 5/17 which pt states is expected .  5/20 improved  Infusion center to set up chemo treatment   CBC, CMP, UA on Monday 5/13   Essentially unremarkable  Recent increased cough - comgmt per IM > improving  Covid/flu/RSV negative 5/13  CXR 5/14 - Right basilar linear opacity, likely atelectasis. Right upper lobectomy.  Encourage IS, flutter      Left hemiparesis (HCC)  Assessment & Plan  Stable   Worsening left hemiparesis likely related to recent radiation necrosis and vasogenic edema, seizures -and ARC program improving  Decadron for hx of vasogenic edema  Continue acute rehabilitation program to maximize function      Brain mass  Assessment & Plan  Patient with known metastatsis to right frontal lobe s/p right frontal craniotomy in March 2023  H-O recommended increasing dex to 8mg BID x2 days and then will resume chronic Dexamethasone 4mg qday per Elaine [facility] H-0  Has been on  Dexamethasone taking 4mg qday per prior providers recently for vasogenic edema (appears to be on 2mg BID prior at home based on chart review)   Patient status post chemotherapy radiation SRT, immunotherapy previously  Status post Avastin infusion on 5/15, postinfusion had fatigue which has resolved  Follows with Dr. Langford    Shortness of breath  Assessment & Plan  Transient Shortness of breath starting 5/20/2024.  Improved 5/21/24 & 5/22/24  Patient recommended CT chest with PE protocol to rule out PE,  however patient is currently refusing testing today, despite both myself and Dr. Andrade educating.  Monitor saturations closely  Continue DuoNebs  Monitor shortness of breath -patient may be agreeable if symptoms continue      Pancytopenia (HCC)  Assessment & Plan  Post Avastin  WBC 2.31 K  Plt 101 K   Monitor     Cognitive impairment  Assessment & Plan  Cog stable 5/17; no recent safety concerns reported  Did have fall 5/11 when he tried to reach down to  urinal - MD mckenna'isidra without signs of injury and patient continues to deny injury from fall  - High fall precautions with frequent rounding - if increased safety concerns consider q15 checks or 1:1  - MOCA completed 5/9 - obtain results   - Increased risk of delirium - monitor   - Patient/family/CG teaching   - Optimal Sleep/wake cycle management  - Limit sedating/deliriogenic meds when possible  - Optimal medical, mood, and pain management   - Skilled therapies as outlined   - Compensatory strategies   - Optimize oversight after discharge  - OP follow-up with PCP and neuro      Cough  Assessment & Plan  Cough and sore throat -improved sore throat with Magic mouthwash  Strep test negative  Continue Duonebs, Magic Mouthwash     Comgmt with IM team  Monitor lung exam, vitals with and without activity  Encourage incentive spirometry/flutter valve   Pt afebrile no leukocytosis; saturating well on RA   Covid/Flu/RSV PCR negative 5/13  CXR 5/14 - Right basilar linear  "opacity, likely atelectasis. Right upper lobectomy.  Mucinex BID and Robitussin DM PRN per IM     Bilateral edema of lower extremity  Assessment & Plan  Stable; if worsens or becomes painful obtain BLE venous doppler   1+ Pitting edema bilaterally likely secondary to increased dependent position.    On Lovenox for DVT prophylaxis   TEDS daily  Elevate limbs more    Sore throat  Assessment & Plan  Improved; Covid/RSV/flu negative 5/13   PRN Robitussin ordered, lozenges, magic mouthwash  See section for cough    Adjustment disorder  Assessment & Plan  Mood acceptable   Supportive counseling  Consider neuropsychology consultation if needed     Pseudobulbar affect  Assessment & Plan  Mood is much improved without emotional lability noted (week of 5/6/2024)  On admission at times was tearful and emotionally labile  May benefit from a future SSRI if continues      Constipation  Assessment & Plan  Stooling adequately   Miralax qday  Declines supp  If needed add senna      History of CVA (cerebrovascular accident)  Assessment & Plan  Patient with ischemic CVA in 2011  Resultant mild left hemiparesis    Hypercholesterolemia  Assessment & Plan  Continue Lipitor 40 mg daily (home dose)    Essential hypertension  Assessment & Plan  Continue Norvasc 10 mg daily and losartan 50 mg daily (home dose)  Monitor BP and heart rate during rest and with activity  Internal medicine managing    Overweight (BMI 25.0-29.9)  Assessment & Plan  Dietary and lifestyle changes when able          Appreciate IM consultants medical co-management.  Labs, medications, and imaging personally reviewed.      ROS:  A ten point review of systems was completed on 05/23/24 and pertinent positives are listed in subjective section. All other systems reviewed were negative.       OBJECTIVE:   /86 (BP Location: Right arm)   Pulse 95   Temp 98 °F (36.7 °C) (Temporal)   Resp 17   Ht 5' 9.02\" (1.753 m)   Wt 95.4 kg (210 lb 5.1 oz)   SpO2 92%   BMI " 31.04 kg/m²       Physical Exam  Vitals and nursing note reviewed.   Constitutional:       General: He is not in acute distress.  HENT:      Head: Normocephalic and atraumatic.      Nose: Nose normal.      Mouth/Throat:      Mouth: Mucous membranes are moist.   Eyes:      Conjunctiva/sclera: Conjunctivae normal.   Cardiovascular:      Rate and Rhythm: Normal rate and regular rhythm.      Pulses: Normal pulses.   Pulmonary:      Effort: Pulmonary effort is normal.      Breath sounds: No wheezing or rales.      Comments: Coarse BS unchanged  Abdominal:      General: Bowel sounds are normal. There is no distension.      Palpations: Abdomen is soft.      Tenderness: There is no abdominal tenderness.   Musculoskeletal:         General: No swelling.      Cervical back: Neck supple.   Skin:     General: Skin is warm.   Neurological:      Mental Status: He is alert and oriented to person, place, and time.      Motor: Weakness (left hemiparesis) present.      Comments: Seen ambualating >100 feet with platform RW   Psychiatric:         Mood and Affect: Mood normal.          Lab Results   Component Value Date    WBC 2.31 (L) 05/20/2024    HGB 11.5 (L) 05/20/2024    HCT 35.0 (L) 05/20/2024    MCV 97 05/20/2024     (L) 05/20/2024     Lab Results   Component Value Date    SODIUM 140 05/20/2024    K 3.9 05/20/2024     05/20/2024    CO2 32 05/20/2024    BUN 12 05/20/2024    CREATININE 0.63 05/20/2024    GLUC 143 (H) 05/20/2024    CALCIUM 8.5 05/20/2024     Lab Results   Component Value Date    INR 0.92 04/20/2024    INR 0.98 08/30/2023    INR 1.06 03/24/2023    PROTIME 12.6 04/20/2024    PROTIME 13.2 08/30/2023    PROTIME 14.0 03/24/2023           Current Facility-Administered Medications:     acetaminophen (TYLENOL) tablet 975 mg, 975 mg, Oral, Q8H PRN, Jenise Dawson PA-C, 975 mg at 05/21/24 0521    albuterol (PROVENTIL HFA,VENTOLIN HFA) inhaler 2 puff, 2 puff, Inhalation, Q4H PRN, Omer Andrade MD     alteplase (CATHFLO) injection 2 mg, 2 mg, Intracatheter, Q1MIN PRN, Collin Langford MD    aluminum-magnesium hydroxide-simethicone (MAALOX) oral suspension 30 mL, 30 mL, Oral, Q6H PRN, Jenise Dawson PA-C    amLODIPine (NORVASC) tablet 10 mg, 10 mg, Oral, Daily, Jenise Dawson PA-C, 10 mg at 05/23/24 1006    atorvastatin (LIPITOR) tablet 40 mg, 40 mg, Oral, After Dinner, Jenise Dawson PA-C, 40 mg at 05/22/24 1720    bisacodyl (DULCOLAX) rectal suppository 10 mg, 10 mg, Rectal, Daily PRN, Omer Andrade MD    cloBAZam (ONFI) tablet 5 mg, 5 mg, Oral, BID, Jenise Dawson PA-C, 5 mg at 05/23/24 1006    dexamethasone (DECADRON) tablet 4 mg, 4 mg, Oral, Daily, Layton Yoder MD, 4 mg at 05/23/24 1006    dextromethorphan-guaiFENesin (ROBITUSSIN DM) oral syrup 10 mL, 10 mL, Oral, Q4H PRN, Corina Arita MD, 10 mL at 05/20/24 2059    diphenhydramine, lidocaine, Al/Mg hydroxide, simethicone (Magic Mouthwash) oral solution 10 mL, 10 mL, Swish & Swallow, Q4H PRN, Omer Andrade MD, 10 mL at 05/22/24 1011    docusate sodium (COLACE) capsule 100 mg, 100 mg, Oral, BID PRN, Corina Arita MD    enoxaparin (LOVENOX) subcutaneous injection 40 mg, 40 mg, Subcutaneous, Daily, Corina Arita MD, 40 mg at 05/23/24 1006    guaiFENesin (MUCINEX) 12 hr tablet 600 mg, 600 mg, Oral, Q12H LEN, Omer Andrade MD, 600 mg at 05/23/24 1006    ipratropium-albuterol (DUO-NEB) 0.5-2.5 mg/3 mL inhalation solution 3 mL, 3 mL, Nebulization, TID, Corina Arita MD, 3 mL at 05/22/24 2036    lacosamide (VIMPAT) tablet 200 mg, 200 mg, Oral, Q12H LEN, Jenise Dawson PA-C, 200 mg at 05/23/24 1006    levETIRAcetam (KEPPRA) tablet 2,000 mg, 2,000 mg, Oral, Q12H LEN, Jenise Dawson PA-C, 2,000 mg at 05/23/24 1007    losartan (COZAAR) tablet 50 mg, 50 mg, Oral, Daily, Jenise Dawson PA-C, 50 mg at 05/23/24 1006    ondansetron (ZOFRAN-ODT) dispersible tablet 4 mg, 4 mg, Oral, Q6H  PRN, Jenise Dawson PA-C    pantoprazole (PROTONIX) EC tablet 40 mg, 40 mg, Oral, Early Morning, Jenise Dawson PA-C, 40 mg at 05/23/24 0522    polyethylene glycol (MIRALAX) packet 17 g, 17 g, Oral, Daily PRN, Corina Arita MD, 17 g at 05/14/24 0923    polyethylene glycol (MIRALAX) packet 17 g, 17 g, Oral, Daily, Layton Yoder MD, 17 g at 05/18/24 0808    pramipexole (MIRAPEX) tablet 0.25 mg, 0.25 mg, Oral, TID, Jenise Dawson PA-C, 0.25 mg at 05/23/24 1006    Past Medical History:   Diagnosis Date    Brain compression (HCC) 03/20/2023    Brain mass 03/20/2023    Cancer (HCC) 2001    Receint lung and brain lesions and prostate cancer in 2001    Cerebral edema (HCC) 03/20/2023    Hypertension     Prostate cancer (HCC) 2001    Rectal bleeding     Stroke (HCC)     2011         Patient Active Problem List    Diagnosis Date Noted    Seizure disorder (HCC) 05/02/2024    Metastatic adenocarcinoma (HCC) 2023    Left hemiparesis (HCC) 05/02/2024    Brain mass 03/20/2023    Shortness of breath 05/21/2024    Pancytopenia (HCC) 05/20/2024    Cough 05/14/2024    Cognitive impairment 05/14/2024    Bilateral edema of lower extremity 05/10/2024    Sore throat 05/08/2024    Frontal mass of brain 05/02/2024    Pseudobulbar affect 05/02/2024    Adjustment disorder 05/02/2024    Constipation 04/25/2024    Seizure-like activity (HCC) 04/23/2024    Generalized weakness 04/20/2024    Tremors of nervous system 04/20/2024    History of CVA (cerebrovascular accident) 06/22/2023    Chemotherapy-induced neutropenia (HCC) 05/03/2023    Encounter for central line care 05/01/2023    Carcinoma of right lung (HCC) 04/13/2023    Lung nodule 03/21/2023    Hypercholesterolemia 09/20/2021    Essential hypertension 09/11/2020    Annual physical exam 09/11/2020    Negative depression screening 02/24/2020    Overweight (BMI 25.0-29.9) 02/24/2020          Corina Arita MD    I have spent a total time of 39  minutes on 05/23/24 in caring for this patient including Impressions, Documenting in the medical record, and accompanying patient to his therapy session, discharge planning .      ** Please Note:  voice to text software may have been used in the creation of this document. Although proof errors in transcription or interpretation are a potential of such software**

## 2024-05-23 NOTE — PLAN OF CARE
Problem: Prexisting or High Potential for Compromised Skin Integrity  Goal: Skin integrity is maintained or improved  Description: INTERVENTIONS:  - Identify patients at risk for skin breakdown  - Assess and monitor skin integrity  - Assess and monitor nutrition and hydration status  - Monitor labs   - Assess for incontinence   - Turn and reposition patient  - Assist with mobility/ambulation  - Relieve pressure over bony prominences  - Avoid friction and shearing  - Provide appropriate hygiene as needed including keeping skin clean and dry  - Evaluate need for skin moisturizer/barrier cream  - Collaborate with interdisciplinary team   - Patient/family teaching  - Consider wound care consult   Outcome: Progressing     Problem: PAIN - ADULT  Goal: Verbalizes/displays adequate comfort level or baseline comfort level  Description: Interventions:  - Encourage patient to monitor pain and request assistance  - Assess pain using appropriate pain scale  - Administer analgesics based on type and severity of pain and evaluate response  - Implement non-pharmacological measures as appropriate and evaluate response  - Consider cultural and social influences on pain and pain management  - Notify physician/advanced practitioner if interventions unsuccessful or patient reports new pain  Outcome: Progressing     Problem: INFECTION - ADULT  Goal: Absence or prevention of progression during hospitalization  Description: INTERVENTIONS:  - Assess and monitor for signs and symptoms of infection  - Monitor lab/diagnostic results  - Monitor all insertion sites, i.e. indwelling lines, tubes, and drains  - Monitor endotracheal if appropriate and nasal secretions for changes in amount and color  - Arthur appropriate cooling/warming therapies per order  - Administer medications as ordered  - Instruct and encourage patient and family to use good hand hygiene technique  - Identify and instruct in appropriate isolation precautions for  identified infection/condition  Outcome: Progressing     Problem: NEUROSENSORY - ADULT  Goal: Achieves stable or improved neurological status  Description: INTERVENTIONS  - Monitor and report changes in neurological status  - Monitor vital signs such as temperature, blood pressure, glucose, and any other labs ordered   - Initiate measures to prevent increased intracranial pressure  - Monitor for seizure activity and implement precautions if appropriate      Outcome: Progressing

## 2024-05-23 NOTE — PROGRESS NOTES
05/22/24 1230   Pain Assessment   Pain Assessment Tool 0-10   Pain Score No Pain   Restrictions/Precautions   Precautions Bed/chair alarms;Cognitive;Fall Risk;Seizure;Supervision on toilet/commode  (R chest wall port)   Braces or Orthoses   (off shelf AFO)   Cognition   Overall Cognitive Status Impaired   Arousal/Participation Alert;Responsive;Cooperative   Attention Attends with cues to redirect   Orientation Level Oriented X4   Memory Decreased short term memory;Decreased recall of precautions   Following Commands Follows one step commands with increased time or repetition   Sit to Lying   Type of Assistance Needed Physical assistance;Verbal cues   Physical Assistance Level 26%-50%   Comment mod A hospital bed   Sit to Lying CARE Score 3   Lying to Sitting on Side of Bed   Type of Assistance Needed Physical assistance;Verbal cues   Physical Assistance Level 26%-50%   Comment min-mod A hospital bed with HOB elevated   Lying to Sitting on Side of Bed CARE Score 3   Sit to Stand   Type of Assistance Needed Physical assistance;Verbal cues   Physical Assistance Level 26%-50%   Comment min-mod A STS with PFRW   Sit to Stand CARE Score 3   Bed-Chair Transfer   Type of Assistance Needed Physical assistance;Verbal cues   Physical Assistance Level 26%-50%   Comment mod A SPT PFRW wheelchair<>hospital bed going towards strong side each direction   Chair/Bed-to-Chair Transfer CARE Score 3   Therapeutic Interventions   Strengthening seated ther ex   Balance transfer training   Assessment   Treatment Assessment Patient agreeable to therapy session.   No pain reported.   Cues for general safety.  Pt's wife in for training during second half of therapy session.   Reviewed transfers wheelchair to bed and bed mobility.  Pt's wife able to complete with minimal verbal cues and better positioning.   Recommend transferring to pt's stronger side at this time as it is the safest way to complete SPT with PFRW.  Recommend putting HOB up  when tranferring from supine>sit as this makes transfer easier on patient and caregiver.  Also recommend that she have a second person to assist her with transfers initially until more comfortable completing alone.  Pt's wife also able to demonstrate putting on/taking off of AFO for LLE.  Recommend only using for transfers and when [facility] PT completes gait training.  Reviewed importance of checking his skin for breakdown after wearing.    Will give wife names of orthotic companies she may want to use if he will need a more customized brace.   Per patient and wife, they feel ready for d/c and are declining extended stay through weekend for further training.   PT Barriers   Physical Impairment Decreased strength;Decreased range of motion;Decreased endurance;Impaired balance;Decreased mobility;Decreased coordination;Decreased safety awareness;Impaired tone   Functional Limitation Wheelchair management;Walking;Transfers;Standing;Stair negotiation;Ramp negotiation;Car transfers   Plan   Treatment/Interventions Functional transfer training;LE strengthening/ROM;Therapeutic exercise;Bed mobility   Progress Progressing toward goals   PT Therapy Minutes   PT Time In 1230   PT Time Out 1330   PT Total Time (minutes) 60   PT Mode of treatment - Individual (minutes) 60   PT Mode of treatment - Concurrent (minutes) 0   PT Mode of treatment - Group (minutes) 0   PT Mode of treatment - Co-treat (minutes) 0   PT Mode of Treatment - Total time(minutes) 60 minutes   PT Cumulative Minutes 1714   Therapy Time missed   Time missed? No

## 2024-05-23 NOTE — CASE MANAGEMENT
MEGHANN called patient's wife Mary Collette, inquired if hospital bed jhas been delivered yet. Mary Collette, she is working on payment of the copay. She will call MEGHANN back. MEGHANN received a phone call from Mary Collette stating copay has been paid, the bed will be delivered tomorrow between 11:00- 3:00PM. Mary Collette will be in after equipment has been delivered. Nursing, therapist, physician made aware.

## 2024-05-23 NOTE — PROGRESS NOTES
05/23/24 0700   Pain Assessment   Pain Assessment Tool 0-10   Pain Score No Pain   Restrictions/Precautions   Precautions Bed/chair alarms;Fall Risk;Cognitive;Multiple lines;Supervision on toilet/commode;Seizure  (port R chest wall for chemo)   Weight Bearing Restrictions No   ROM Restrictions No   Eating   Type of Assistance Needed Set-up / clean-up   Physical Assistance Level No physical assistance   Eating CARE Score 5   Eating Assessment   Food To Mouth Yes   Noted Coughing   Positioning Upright;Out of Bed   Meal Assessed Breakfast   Finishes Timely Yes   Opens Packages No   Oral Hygiene   Type of Assistance Needed Set-up / clean-up   Physical Assistance Level No physical assistance   Comment seated at sink in w/c   Oral Hygiene CARE Score 5   Grooming   Able To Acquire Items;Comb/Brush Hair;Wash/Dry Face;Brush/Clean Teeth;Wash/Dry Hands   Limitation Noted In Coordination;Problem Solving;Safety;Strength   Shower/Bathe Self   Type of Assistance Needed Physical assistance   Physical Assistance Level 26%-50%   Comment min-modA to wash/dry buttocks and to maintain balance while standing at sink to wash/dry perineal area   Shower/Bathe Self CARE Score 3   Bathing   Assessed Bath Style Sponge Bath   Anticipated D/C Bath Style Shower;Sponge Bath   Able to Gather/Transport No   Able to Wash/Rinse/Dry (body part) Left Arm;Right Arm;L Upper Leg;R Upper Leg;L Lower Leg/Foot;R Lower Leg/Foot;Abdomen;Chest;Perineal Area   Limitations Noted in Balance;Coordination;Endurance;ROM;Safety;Problem Solving;Strength   Positioning Seated;Standing   Tub/Shower Transfer   Reason Not Assessed Sponge Bath   Upper Body Dressing   Type of Assistance Needed Physical assistance   Physical Assistance Level 25% or less   Comment Alek to pull shirt down   Upper Body Dressing CARE Score 3   Lower Body Dressing   Type of Assistance Needed Physical assistance;Verbal cues;Adaptive equipment   Physical Assistance Level 51%-75%   Comment pt  required verbal cues to use compensatory strategy to don pants. OTS helped thread BLEs into pants and LLE into undergarment. Pt then able to pull LB clothing up to mid-thigh level. OTS aided in pulling up over L hip and over backside. OTS maintained balance while pt donned LB clothing over R hip. Pt attempted to utilize reacher during LB dressing however unsuccesful.   Lower Body Dressing CARE Score 2   Putting On/Taking Off Footwear   Type of Assistance Needed Set-up / clean-up   Physical Assistance Level No physical assistance   Comment pt able to don/doff socks with increaed time by crossing his legs to reach bilat feet; fatigue noted.   Putting On/Taking Off Footwear CARE Score 5   Picking Up Object   Reason if not Attempted Safety concerns   Picking Up Object CARE Score 88   Dressing/Undressing Clothing   Remove UB Clothes Pullover Shirt   Don UB Clothes Pullover Shirt   Remove LB Clothes Socks   Don LB Clothes Pants;Undergarment;Socks   Limitations Noted In Balance;Coordination;Endurance;Problem Solving;Safety;Strength;ROM   Adaptive Equipment Reacher   Positioning Supported Sit;Standing   Roll Left and Right   Type of Assistance Needed Supervision   Physical Assistance Level No physical assistance   Comment Pt rolled onto L side at supervision level   Roll Left and Right CARE Score 4   Lying to Sitting on Side of Bed   Type of Assistance Needed Physical assistance   Physical Assistance Level 26%-50%   Comment pt able to remove BLEs from bed using compensatory method, however required assist moving trunk. Pt began to use RUE to push up on bedrail, but OTS provided modA to complete transfer.   Lying to Sitting on Side of Bed CARE Score 3   Sit to Stand   Type of Assistance Needed Physical assistance   Physical Assistance Level 26%-50%   Comment min-modA   Sit to Stand CARE Score 3   Bed-Chair Transfer   Type of Assistance Needed Physical assistance   Physical Assistance Level 51%-75%   Comment modA SPT from EOB  to w/c   Chair/Bed-to-Chair Transfer CARE Score 2   Transfer Bed/Chair/Wheelchair   Positioning Concerns Cognition;Other  (hemiparesis L side)   Limitations Noted In Balance;Coordination;Endurance;UE Strength;LE Strength;Problem Solving   Toileting Hygiene   Type of Assistance Needed Physical assistance   Physical Assistance Level 26%-50%   Comment pt required modA to don undergarment over backside and L hip; pt able to maintain balance using grab bar while OTS assisted to don undergarment.   Toileting Hygiene CARE Score 3   Toileting   Able to Pull Clothing   (pt was not wearing LB clothing to pull down, pt able to pull undergarment over R hip; OTS assisted to pull over L hip and backside.)   Manage Hygiene Bladder  (incontinence noted in undergarment and on w/c befoer transfer; pt reports he becomes incontinent sometimes when he coughs.)   Limitations Noted In Balance;Coordination;Problem Solving;ROM;Safety;UE Strength;LE Strength   Toilet Transfer   Type of Assistance Needed Physical assistance   Physical Assistance Level 26%-50%   Comment pt required min-modA to maintain balance during transfer and to guide pelvis to raised toilet.   Toilet Transfer CARE Score 3   Toilet Transfer   Surface Assessed Raised Toilet   Transfer Technique Stand Pivot   Limitations Noted In Balance;Endurance;Problem Solving;ROM;Safety;UE Strength;LE Strength   Adaptive Equipment Grab Bar   Positioning Concerns Cognition   Exercise Tools   Hand Gripper 5# digiflex 50 reps each hand   Other Exercise Tool 1 yellow flexbar sup/pron, 30 reps each; bilat ulnar deviation/radial deviation 20 reps each ex, each hand   Cognition   Overall Cognitive Status Impaired   Arousal/Participation Alert;Responsive;Cooperative   Attention Attends with cues to redirect   Orientation Level Oriented X4   Memory Decreased recall of recent events;Decreased short term memory;Decreased recall of precautions   Following Commands Follows one step commands with  increased time or repetition   Additional Activities   Additional Activities Other (Comment)   Additional Activities Comments w/c fxl mobility around room; OT recorded video of pt completing LB dressing and bathing of perineal area/buttocks per pt's wife request to continue education on how pt performs ADLs.   Activity Tolerance   Activity Tolerance Patient tolerated treatment well;Patient limited by fatigue   Other Comments   Assessment Pt particiapted with 18 minutes concurrent tx with Pt with similar goals with focus of overall strengthening and activity tolerence for use with ADL routines.   Assessment   Treatment Assessment Pt agreeable to skilled OT session this AM, focusing on ADL training, fxl transfers, and fxl mobility; details listed in respective sections. Pt fatigued during session today; OTS administered several rest breaks during session to encourage energy conservation. Pt required more assist to don LB clothing today when compared to previous sessions. Pt noted to stand with less assistance when able to pull up on sink or grab bar as compared to trying to stand when pushing up from w/c or EOB. Pt demonstrated good carryover of LLE management when getting out of bed, preparing for transfers, and when in w/c. Verbal cues required for LUE positioning. Pt's barriers to d/c include decreased strength throughout but especially L side s/p previous CVA, decreased balance, impaired coordination L side, impaired safety awareness, problem solving, and attention, and decreased activity tolerance; all affect independence in self care and fxl transfers. Pt would benefit from continued skilled OT services in order to address listed barriers and prepare for safe d/c.   Prognosis Good   Problem List Decreased strength;Decreased range of motion;Decreased endurance;Impaired balance;Decreased coordination;Decreased cognition;Impaired judgement;Decreased safety awareness   Plan   Treatment/Interventions ADL  retraining;Functional transfer training;Therapeutic exercise;Endurance training;Patient/family training;Cognitive reorientation;Equipment eval/education;Bed mobility;Compensatory technique education;OT   Progress Progressing toward goals   OT Therapy Minutes   OT Time In 0700   OT Time Out 0831   OT Total Time (minutes) 91   OT Mode of treatment - Individual (minutes) 73   OT Mode of treatment - Concurrent (minutes) 18   Therapy Time missed   Time missed? No

## 2024-05-23 NOTE — PROGRESS NOTES
05/23/24 1230   Pain Assessment   Pain Assessment Tool 0-10   Pain Score No Pain   Restrictions/Precautions   Precautions Bed/chair alarms;Cognitive;Fall Risk;Multiple lines;Seizure;Supervision on toilet/commode   Weight Bearing Restrictions No   ROM Restrictions No   Exercise Tools   Other Exercise Tool 1 games of close the box with OT, initial orientation to game play rules required with pt manipulating dice with R hand and tiles with L hand, increased time to manipulate pieces but 100% accuracy in game play and mental math/problem solving   Coordination   Fine Motor tied and untied knots in red tubing   Cognition   Overall Cognitive Status Impaired   Arousal/Participation Alert;Responsive;Cooperative   Attention Attends with cues to redirect   Orientation Level Oriented X4   Memory Decreased short term memory;Decreased recall of precautions   Following Commands Follows one step commands with increased time or repetition   Assessment   Treatment Assessment Pt seen for brief OT session this PM focusing on fine motor coordination and cognition; details in respective sections. Pt reported feeling very fatigued from earlier therapy sessions, however remained an active participant in therapy and is eager to d/c tomorrow. Pt's barriers to d/c include decreased strength throughout but especially L side s/p previous CVA, decreased balance, impaired coordination L side, impaired safety awareness, problem solving, and attention, and decreased activity tolerance; all affect independence in self care and fxl transfers. Pt would benefit from continued skilled OT services in order to address listed barriers and prepare for safe d/c.   Prognosis Good   Problem List Decreased strength;Decreased range of motion;Decreased endurance;Impaired balance;Decreased coordination;Decreased cognition;Impaired judgement;Decreased safety awareness   Plan   Treatment/Interventions ADL retraining;Functional transfer training;LE  strengthening/ROM;Therapeutic exercise;Endurance training;Cognitive reorientation;Patient/family training;Equipment eval/education;Compensatory technique education;OT   Progress Progressing toward goals   OT Therapy Minutes   OT Time In 1230   OT Time Out 1300   OT Total Time (minutes) 30   OT Mode of treatment - Individual (minutes) 30

## 2024-05-23 NOTE — PROGRESS NOTES
"Progress Note - Paras Pitts 68 y.o. male MRN: [id number]    Unit/Bed#: [facility] 213-02 Encounter: [id number]        Subjective:   Patient seen and examined at bedside after reviewing the chart and discussing the case with the caring staff.      Patient examined at bedside.  Patient reported to have new urinary incontinence overnight past 2 days.  Denies any dysuria, frequency, difficulty urinating, flank pain.  UA ordered.     Physical Exam   Vitals: Blood pressure 166/86, pulse 95, temperature 98 °F (36.7 °C), temperature source Temporal, resp. rate 17, height 5' 9.02\" (1.753 m), weight 95.4 kg (210 lb 5.1 oz), SpO2 92%.,Body mass index is 31.04 kg/m².  Constitutional: Patient in no acute distress.  HEENT: PERR, EOMI, MMM.   Cardiovascular: Normal rate and regular rhythm.    Pulmonary/Chest: Normal effort and breath sounds normal.   Abdomen: Soft, + BS, NT.    Assessment/Plan:  Paras Pitts is a(n) 68 y.o. male with tremors of the nervous system.     Cardiac Hx w/ HTN, HLD, hx of CVA.  Continue amlodipine 10 mg daily, Losartan 50 mg daily, atorvastatin 40 mg daily.  Type 2 diabetes mellitus.  Hgb A1c 6.7% on 5/3/24.  Carb controlled diet.   GERD.  Continue Protonix 40 mg daily.  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  Stage [place].  S/p robotic converted to right thoracotomy and right upper lobectomy on 9/1/2023. S/p bronchoscopy with EBUS at [facility] on 4/16/2024.  Continue Decadron 4 mg daily.  Patient completed [facility] 4/29.  Received Avastin infusion 5/15.  Acute pharyngitis.  Improving.  Throat culture negative 5/20/24.  Magic mouthwash and lozenges as needed.  Cough/congestion/shortness of breath.  Improving.  Patient started on DuoNebs 3 times daily, albuterol inhaler as needed, Mucinex twice daily and Robitussin as needed.  COVID/RSV/flu negative 5/13.  CXR was negative for pneumonia 5/14/2024. Patient refused CTA to rule out PE on 5/21/2024.   Pain and bowel regimen. As per " physiatry.  Tremors of the nervous system.  Continue Keppra 2000 mg twice daily, Vimpat 200 mg twice daily, clobazam 5 mg twice daily.  Follow up in 4 weeks with epilepsy attending.  Patient receiving intensive PT OT as per physiatry.     Anticipated date of discharge.  Friday, 5/24/2024    The patient was discussed with Dr. Andrade and he is in agreement with the above note.

## 2024-05-23 NOTE — PROGRESS NOTES
05/23/24 0900   Pain Assessment   Pain Assessment Tool 0-10   Pain Score No Pain   Restrictions/Precautions   Precautions Bed/chair alarms;Fall Risk;Cognitive;Multiple lines;Supervision on toilet/commode;Seizure  (R chest wall port for chemo)   Cognition   Overall Cognitive Status Impaired   Arousal/Participation Alert;Responsive;Cooperative   Attention Attends with cues to redirect   Orientation Level Oriented X4   Memory Decreased short term memory;Decreased recall of precautions   Following Commands Follows one step commands with increased time or repetition   Sit to Stand   Type of Assistance Needed Physical assistance;Verbal cues   Physical Assistance Level 26%-50%   Comment min-mod A with PFRW   Sit to Stand CARE Score 3   Bed-Chair Transfer   Type of Assistance Needed Physical assistance;Verbal cues   Physical Assistance Level 26%-50%   Comment mod A SPT wheelchair<>bed using PFRW   Chair/Bed-to-Chair Transfer CARE Score 3   Walk 10 Feet   Type of Assistance Needed Physical assistance;Verbal cues   Physical Assistance Level Total assistance   Comment min-mod A level and unlevel surfaces with PFRW; second person for wheelchair follow   Walk 10 Feet CARE Score 1   Walk 50 Feet with Two Turns   Type of Assistance Needed Physical assistance;Verbal cues   Physical Assistance Level Total assistance   Comment min-mod A level and unlevel surfaces with PFRW; second person for wheelchair follow   Walk 50 Feet with Two Turns CARE Score 1   Walking 10 Feet on Uneven Surfaces   Type of Assistance Needed Physical assistance;Verbal cues   Physical Assistance Level Total assistance   Comment min-mod A level and unlevel surfaces with PFRW; second person for wheelchair follow   Walking 10 Feet on Uneven Surfaces CARE Score 1   Ambulation   Primary Mode of Locomotion Prior to Admission Walk   Distance Walked (feet) 55 ft  (31' 55' 44' 28')   Assist Device Platform;Roller Walker   Gait Pattern Inconsistant [place];Slow  [place];Decreased foot clearance;L foot drag;L hemiparesis;Narrow ABHINAV;Step to;Decreased L stance;Improper weight shift   Limitations Noted In Balance;Coordination;Device Management;Endurance;Heel Strike;Posture;Safety;Sequencing;Strength   Provided Assistance with: Balance;Limb Stabilization;Weight Shift   Walk Assist Level Minimum Assist;Moderate Assist;Chair Follow   Findings min-mod A level and unlevel surfaces with PFRW; second person for wheelchair follow   Does the patient walk? 2. Yes   Wheel 50 Feet with Two Turns   Type of Assistance Needed Independent   Comment mod I level and unlevel surfaces; increased time to complete   Wheel 50 Feet with Two Turns CARE Score 6   Wheel 150 Feet   Type of Assistance Needed Independent   Comment mod I level and unlevel surfaces; increased time to complete   Wheel 150 Feet CARE Score 6   Wheelchair mobility   Does the patient use a wheelchair? 1. Yes   Type of Wheelchair Used 1. Manual   Method Right upper extremity;Right lower extremity   Assistance Provided For Remove Leg Rest;Replace Leg Rest;Remove armrests;Replace armrests   Distance Level Surface (feet) 227 ft   Distance Wheeled 3% Grade 24 ft   Findings mod I level and unlevel surfaces; increased time to complete   Toilet Transfer   Type of Assistance Needed Physical assistance;Verbal cues   Physical Assistance Level 26%-50%   Comment mod A using grab bar wheelchair<>toilet with raised seat   Toilet Transfer CARE Score 3   Therapeutic Interventions   Strengthening seated and standing ther ex   Balance gait and transfer (from multiple surfaces) training   Other wheelchair mobility   Assessment   Treatment Assessment Patient agreeable to therapy session.  No pain reported.  Increased SOB noted today with increased audible wheezing.   Patient also with increased coughing t/o therapy session, requiring frequent rest breaks.    Cues for general safety and task sequence remain.  Completed ther ex for general LE  strengthening as well as LLE control; gait and transfer training focusing on sequence and technique for improved balance and safety with functional mobility using PFRW.  Patient propels wheelchair MOD I level and unlevel surfaces with increased time due to decreased endurance and increased SOB.   PT Barriers   Physical Impairment Decreased strength;Decreased range of motion;Decreased endurance;Impaired balance;Decreased mobility;Decreased coordination;Decreased safety awareness;Impaired tone   Functional Limitation Car transfers;Ramp negotiation;Stair negotiation;Standing;Transfers;Walking;Wheelchair management   Plan   Treatment/Interventions Functional transfer training;LE strengthening/ROM;Elevations;Therapeutic exercise;Gait training  (wheelchair mobility)   Progress Progressing toward goals   PT Therapy Minutes   PT Time In 0900   PT Time Out 1100   PT Total Time (minutes) 120   PT Mode of treatment - Individual (minutes) 120   PT Mode of treatment - Concurrent (minutes) 0   PT Mode of treatment - Group (minutes) 0   PT Mode of treatment - Co-treat (minutes) 0   PT Mode of Treatment - Total time(minutes) 120 minutes   PT Cumulative Minutes 1834   Therapy Time missed   Time missed? No

## 2024-05-23 NOTE — PROGRESS NOTES
"   05/23/24 1100   Pain Assessment   Pain Assessment Tool 0-10   Pain Score No Pain   Pain Location/Orientation Orientation: Right;Location: Hip   Pain Onset/Description Onset: Ongoing   Effect of Pain on Daily Activities guar   Patient's Stated Pain Goal No pain   Hospital Pain Intervention(s) Repositioned;Rest   Multiple Pain Sites No   Restrictions/Precautions   Precautions Bed/chair alarms;Cognitive;Fall Risk;Multiple lines;Supervision on toilet/commode;Seizure   Weight Bearing Restrictions No   ROM Restrictions No   Cognitive Linguisitic Assessments   Cognitive Linquistic Quick Test (CLQT) RBANS COMPLETED   Comprehension   Assist Devices Glasses   QI: Comprehension 3. Usually Understands: Understands most conversations, but misses some part/intent of message. Requires cues at times to understand.   Comprehension (FIM) 6 - Has only MILD difficulty with complex/abstract info   Expression   QI: Expression 4. Express complex messages without difficulty and with speech that is clear and easy to understan   Expression (FIM) 6 - Has only MILD difficulty with complex/abstract info   Social Interaction   Social Interaction (FIM) 6 - Interacts appropriately with others BUT requires extra  time   Problem Solving   Problem solving (FIM) 6 - Solves complex problems BUT requires extra time   Memory   Memory (FIM) 6 - Recognizes with extra time   Memory Skills   Orientation Level Oriented X4   Language Assessments   [place] Diagnostic Aphasia Exam (BDAE) BDAE short form   Informal Speech Language Assessment TAWF-2 completed   Speech/Language/Cognition Assessmetn   Treatment Assessment Patient received sitting upright in bedside wheelchair.  He continues to reported \"restricted\" feeling in his throat 2' chemotherapy.  Patient reports eating less overall and decreasing selected items to find what accomodates this discomfort.  We discussed food options for return home, though patient continues that he also has trouble with water " "and shakes at times, \"it just seems to aggravate it.\"  He demonstrates appropriate problem solving around selecting items that his body will tolerate without difficulty as well as items that allow him good overall intake.  Patient reports that expressive language and processing has improved.  Minimal word finding demonsrated throughout therapy today, pt demonstrating improvement.  Pt i'ly demonstrates good participation in complex conversation, addiitonal time required for processing.   Eating   Type of Assistance Needed Set-up / clean-up   Physical Assistance Level No physical assistance   Comment assistance to open containers   Eating CARE Score 5   Swallow Assessment Prognosis   Prognosis Good   Prognosis Considerations Co-morbidities   SLP Therapy Minutes   SLP Time In 1100   SLP Time Out 1130   SLP Total Time (minutes) 30   SLP Mode of treatment - Individual (minutes) 30   SLP Mode of treatment - Concurrent (minutes) 0   SLP Mode of treatment - Group (minutes) 0   SLP Mode of treatment - Co-treat (minutes) 0   SLP Mode of Treatment - Total time(minutes) 30 minutes   SLP Cumulative Minutes 990   Therapy Time missed   Time missed? No       "

## 2024-05-23 NOTE — DISCHARGE SUMMARY
Discharge Summary   Paras Pitts 68 y.o. male MRN: [id number]  Unit/Bed#: [facility] 213-02 Encounter: [id number]    Admission Date: 5/1/2024     Discharge Date: 5/24/2204    Rehab Diagnosis: Impairment of mobility, safety, Activities of Daily Living (ADLs), and cognitive/communication skills due to Brain Dysfunction:  02.1  Non-Traumatic  Etiologic Diagnosis: New onset epilepsy/New onset seizure disorder in the setting new right frontal lobe vasogenic edema and radiation necrosis related to metastatic adenocarcinoma right frontal brain mass    HPI: Paras Pitts is a(n) 68 y.o. year old male who is admitted to [facility].  Patient originally presented to [facility] ED on 4/20/24 for new uncontrollable tremors of the left upper and lower extremity and weakness. Patient has history of stroke with chronic left sided weakness. Initial stroke work up negative. Neuro consulted. MRI showed no additional evidence of stroke/TIA. Neuro began seizure evaluation. Patient transferred to [place] for video EEG monitoring to guide AED escalation.  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 Neuro recommended radiation oncology consultation for further management in which they recommended increase in dexamethasone. AEDs optimized. PT OT consulted and recommended inpatient acute rehab until next round of chemo.     The medical team was managing the following medical conditions during the course of the patient's admission:   Cardiac Hx w/ HTN, HLD, hx of CVA.  Continue amlodipine 10 mg daily, Losartan 50 mg daily, atorvastatin 40 mg daily.  Type 2 diabetes mellitus.  Hgb A1c 6.7% on 5/3/24.  Carb controlled diet.   GERD.  Continue Protonix 40 mg daily.  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  Stage [place].  S/p robotic converted to right thoracotomy and right upper lobectomy on  9/1/2023. S/p bronchoscopy with EBUS at [facility] on 4/16/2024.  Continue Decadron 4 mg daily.  Patient completed [facility]SIM at [facility] 4/29.  Received Avastin infusion 5/15.  Acute pharyngitis.  Improving.  Throat culture negative 5/20/24.  Magic mouthwash and lozenges as needed.  Cough/congestion/shortness of breath.  Improving.  Patient started on DuoNebs 3 times daily, albuterol inhaler as needed, Mucinex twice daily and Robitussin as needed.  COVID/RSV/flu negative 5/13.  CXR was negative for pneumonia 5/14/2024.  Patient refused CTA to rule out PE on 5/21/2024.   Pain and bowel regimen. As per physiatry.  Tremors of the nervous system.  Continue Keppra 2000 mg twice daily, Vimpat 200 mg twice daily, clobazam 5 mg twice daily.  Follow up in 4 weeks with epilepsy attending.  Patient receiving intensive PT OT as per physiatry.     Physiatry Additions/Comorbidities:    * Seizure disorder (HCC)  Assessment & Plan  New onset seizures presenting for 2024  CT head showing right frontal lobe vasogenic edema with known underlying lesion.    MRI brain showing a right superior frontal mass resection and chemoradiation with unchanged linear enhancement along the surgical cavity compared to 3/26/2024 favoring radiation necrosis.    Patient seen by neurology.    Video EEG showing: Early in recording there was frequent or nearly continuous focal motor seizure and later there were two subclinical right fronto central electrographic seizures.    Patient placed on the following AED regimen: Keppra 2 g twice daily, Vimpat 200 mg twice daily, Onfi 5 mg twice daily.  *Of note patient was significantly sedated with Ativan.  (Patient also on Mirapex 0.25mg TID presumable for tremors or RLS)  Continue seizure precautions  NO DRIVING - Neurology completed P[employer]NDOT form     During ARC, no seizure-like activity thus far.  Patient to follow-up with neurology-epileptologist as an outpatient.     Metastatic adenocarcinoma (HCC)  Assessment  & Plan  Known metastatic stage IV adenocarcinoma of the lung which has metastasized to the brain, lymph node and thorax- patient status post right frontal craniotomy 3/23/2023, right upper lobectomy with lymph node resection 9/1/2023, chemotherapy, radiation SRT, immunotherapy  Recent PET scan showing mediastinal recurrence  Patient was seen by radiation oncology regarding edema and radiation necrosis.  Dexamethasone taking 4mg qday per prior providers recently for vasogenic edema (appears to be on 2mg BID prior at home based on chart review)   Patient is under the care of Dr. Langford for medical oncology and is to begin Avastin, chemotherapy radiation therapy for his recurrent mediastinal disease post rehabilitation.  Avastin Infusion 5/15/2024. Increased fatigue 5/17 which pt states is expected .  5/20 improved  Infusion center to set up next chemo treatment  due 5/30/24  CBC, CMP, UA on Monday 5/13   Essentially unremarkable  Recent increased cough - comgmt per IM > improving  Covid/flu/RSV negative 5/13  CXR 5/14 - Right basilar linear opacity, likely atelectasis. Right upper lobectomy.  Encourage IS, flutter     Left hemiparesis (HCC)  Assessment & Plan  Stable   Worsening left hemiparesis likely related to recent radiation necrosis and vasogenic edema, seizures -and ARC program improving  Decadron for hx of vasogenic edema  Continue acute rehabilitation program to maximize function - function is improving.        Acute Rehabilitation Center Course: Patient participated in a comprehensive interdisciplinary inpatient rehabilitation program which included involvment of Rehab MD, therapies (PT, OT, and/or SLP), RN, CM, SW, dietary, and psychology services.  Significant Findings, Care, Treatment and Services Provided: Acute comprehensive interdisciplinary inpatient rehabilitation including PT, OT, SLP, RN, CM, SW, dietary, psychology, etc.    Patient will continue his rehabilitative course with home care RN, PT, OT,  SLP.    Multiple sessions of family training completed with patient's wife.     Functional Status Upon Admission to [facility]:  Physical Therapy: Min-mod assist with bed mobility, min-mod assist with stand pivot transfers, mod-max assist to ambulate 3-10 feet x 2  Occupational Therapy: Supervision with eating, mod assist with grooming, mod-max assist with upper body ADLs, mod-max assist with lower body ADLs, max assist with toileting    Functional Status Upon Discharge from [facility]:   Physical Therapy Occupational Therapy Speech Therapy   Weight Bearing Status: Weight Bearing as Tolerated  Transfers:  (min-mod A STS with PFRW, min-mod A sit pivot no AD, mod-max A SPT with PFRW)  Bed Mobility:  (min-mod up to mod-max A)  Amulation Distance (ft): 48 feet  Ambulation: Minimal Assistance, Moderate Assistance  Assistive Device for Ambulation:  (PFRW and L off shelf AFO)  Wheelchair Mobility Distance: 201 ft  Wheelchair Mobility: Supervision, Independent  Number of Stairs: 3  Assistive Device for Stairs: Bilateral Hand Rails  Stair Assistance: Assist of 2 (mod -max A x 1 plus min A of another going up forward and down backwards)  Discharge Recommendations: Home with:  DC Home with:: 24 Hour Assisteance, Family Support, First Floor Setup, Home Physical Therapy   Eating: Supervision  Grooming: Independent  Bathing: Minimal Assistance  Bathing: Minimal Assistance  Upper Body Dressing: Minimal Assistance  Lower Body Dressing: Minimal Assistance, Moderate Assistance  Toileting: Minimal Assistance, Moderate Assistance  Tub/Shower Transfer:  (TBA)  Toilet Transfer: Minimal Assistance, Moderate Assistance  Cognition: Exceptions to WNL  Cognition: Decreased Memory, Decreased Attention, Decreased Comprehension, Decreased Safety  Orientation: Person, Place, Time, Situation   Mode of Communication: Verbal  Cognition: Exceptions to WNL  Cognition: Decreased Memory, Decreased Executive Functions, Decreased Attention  Orientation: Person, Place,  Time, Situation  Swallowing: Within Defined Limits  Diet Recommendations: Regular Diet, Thin  Discharge Recommendations: Home with:  DC Home with:: Family Support, Outpatient Speech Therapy         Discharge Medications:   See after visit summary for reconciled discharge medications provided to patient and family.      Condition at Discharge: stable     Discharge instructions/Information to patient and family:   See after visit summary for information provided to patient and family.      Provisions for Follow-Up Care:  See after visit summary for information related to follow-up care and any pertinent home health orders.      Future Appointments   Date Time Provider Department Center   5/30/2024 12:30 PM GH INF CHAIR 1  [facility]   6/6/2024 12:30 PM GH INF CHAIR 4  [facility]   6/13/2024 12:30 PM GH INF CHAIR 4  [facility]   6/27/2024 12:30 PM GH INF CHAIR 2  [facility]   10/25/2024  2:00 PM DO [facility]       Disposition: Home    Planned Readmission: No    Discharge Statement   I spent 45 minutes discharging the patient. This time was spent on the day of discharge. I had direct contact with the patient on the day of discharge. Greater than 50% of the total time was spent examining patient, answering all patient questions, arranging and discussing plan of care with patient as well as directly providing post-discharge instructions. Additional time then spent on discharge activities.    Discharge Medications:  See after visit summary for reconciled discharge medications provided to patient and family.      Facility Administered Medications Prior to Discharge:    Current Facility-Administered Medications   Medication Dose Route Frequency Provider Last Rate    acetaminophen  975 mg Oral Q8H PRN Jenise Dawson PA-C      albuterol  2 puff Inhalation Q4H PRN Omer Andrade MD      alteplase  2 mg Intracatheter Q1MIN PRN Collin Langford  MD      aluminum-magnesium hydroxide-simethicone  30 mL Oral Q6H PRN Jenise Dawson PA-C      amLODIPine  10 mg Oral Daily Jenise Dawson PA-C      atorvastatin  40 mg Oral After Dinner Jenise Dwason PA-C      bisacodyl  10 mg Rectal Daily PRN Slovak Raymond, MD      cloBAZam  5 mg Oral BID Jenise Dawson PA-C      dexamethasone  4 mg Oral Daily Layton Yoder MD      dextromethorphan-guaiFENesin  10 mL Oral Q4H PRN Corina Arita MD      diphenhydramine, lidocaine, Al/Mg hydroxide, simethicone  10 mL Swish & Swallow Q4H PRN Slovak Raymond, MD      docusate sodium  100 mg Oral BID PRN Corina Arita MD      enoxaparin  40 mg Subcutaneous Daily Corina Arita MD      guaiFENesin  600 mg Oral Q12H LEN Slovak Raymond, MD      ipratropium-albuterol  3 mL Nebulization TID Corina Arita MD      lacosamide  200 mg Oral Q12H LEN Jenise Dawson PA-C      levETIRAcetam  2,000 mg Oral Q12H LEN Jenise Dawson PA-C      losartan  50 mg Oral Daily Jenise Dawson PA-C      ondansetron  4 mg Oral Q6H PRN Jenise Dawson PA-C      pantoprazole  40 mg Oral Early Morning Jenise Dawson PA-C      polyethylene glycol  17 g Oral Daily PRN Corina Arita MD      polyethylene glycol  17 g Oral Daily Layton Yoder MD      pramipexole  0.25 mg Oral TID Jenise Dawson PA-C

## 2024-05-23 NOTE — NURSING NOTE
Patient resting in bed at this time.  No signs of distress noted.  Patient was able to stand pivot transfer with one assist into bed overnight with platform walker.  Left sided weakness continues.  Patient was incontinent of urine at this time stating he pees a little each time he coughs.  Dry non-productive cough noted at times.  Bed alarm in place to promote patient safety.  Call bell within reach.  Plan of care ongoing.

## 2024-05-24 VITALS
TEMPERATURE: 97.5 F | OXYGEN SATURATION: 95 % | HEIGHT: 69 IN | RESPIRATION RATE: 18 BRPM | WEIGHT: 210.32 LBS | HEART RATE: 72 BPM | DIASTOLIC BLOOD PRESSURE: 64 MMHG | SYSTOLIC BLOOD PRESSURE: 134 MMHG | BODY MASS INDEX: 31.15 KG/M2

## 2024-05-24 LAB
DME PARACHUTE DELIVERY DATE ACTUAL: NORMAL
DME PARACHUTE DELIVERY DATE ACTUAL: NORMAL
DME PARACHUTE DELIVERY DATE EXPECTED: NORMAL
DME PARACHUTE DELIVERY DATE EXPECTED: NORMAL
DME PARACHUTE DELIVERY DATE REQUESTED: NORMAL
DME PARACHUTE DELIVERY DATE REQUESTED: NORMAL
DME PARACHUTE DELIVERY NOTE: NORMAL
DME PARACHUTE DELIVERY NOTE: NORMAL
DME PARACHUTE ITEM DESCRIPTION: NORMAL
DME PARACHUTE ORDER STATUS: NORMAL
DME PARACHUTE ORDER STATUS: NORMAL
DME PARACHUTE SUPPLIER NAME: NORMAL
DME PARACHUTE SUPPLIER NAME: NORMAL
DME PARACHUTE SUPPLIER PHONE: NORMAL
DME PARACHUTE SUPPLIER PHONE: NORMAL

## 2024-05-24 PROCEDURE — 92507 TX SP LANG VOICE COMM INDIV: CPT | Performed by: NURSE PRACTITIONER

## 2024-05-24 PROCEDURE — 92526 ORAL FUNCTION THERAPY: CPT | Performed by: NURSE PRACTITIONER

## 2024-05-24 RX ORDER — ACETAMINOPHEN 325 MG/1
650 TABLET ORAL EVERY 6 HOURS PRN
Start: 2024-05-24

## 2024-05-24 RX ADMIN — ENOXAPARIN SODIUM 40 MG: 40 INJECTION SUBCUTANEOUS at 10:09

## 2024-05-24 RX ADMIN — AMLODIPINE BESYLATE 10 MG: 10 TABLET ORAL at 10:08

## 2024-05-24 RX ADMIN — GUAIFENESIN 600 MG: 600 TABLET ORAL at 10:07

## 2024-05-24 RX ADMIN — POLYETHYLENE GLYCOL 3350 17 G: 17 POWDER, FOR SOLUTION ORAL at 10:09

## 2024-05-24 RX ADMIN — PRAMIPEXOLE DIHYDROCHLORIDE 0.25 MG: 0.12 TABLET ORAL at 10:08

## 2024-05-24 RX ADMIN — PANTOPRAZOLE SODIUM 40 MG: 40 TABLET, DELAYED RELEASE ORAL at 05:24

## 2024-05-24 RX ADMIN — DEXAMETHASONE 4 MG: 4 TABLET ORAL at 10:07

## 2024-05-24 RX ADMIN — CLOBAZAM 5 MG: 10 TABLET ORAL at 10:07

## 2024-05-24 RX ADMIN — LEVETIRACETAM 2000 MG: 500 TABLET, FILM COATED ORAL at 07:41

## 2024-05-24 RX ADMIN — LOSARTAN POTASSIUM 50 MG: 50 TABLET, FILM COATED ORAL at 10:08

## 2024-05-24 RX ADMIN — IPRATROPIUM BROMIDE AND ALBUTEROL SULFATE 3 ML: 2.5; .5 SOLUTION RESPIRATORY (INHALATION) at 10:09

## 2024-05-24 RX ADMIN — LACOSAMIDE 200 MG: 150 TABLET ORAL at 10:15

## 2024-05-24 NOTE — PLAN OF CARE
Problem: PAIN - ADULT  Goal: Verbalizes/displays adequate comfort level or baseline comfort level  Description: Interventions:  - Encourage patient to monitor pain and request assistance  - Assess pain using appropriate pain scale  - Administer analgesics based on type and severity of pain and evaluate response  - Implement non-pharmacological measures as appropriate and evaluate response  - Consider cultural and social influences on pain and pain management  - Notify physician/advanced practitioner if interventions unsuccessful or patient reports new pain  Outcome: Adequate for Discharge     Problem: INFECTION - ADULT  Goal: Absence or prevention of progression during hospitalization  Description: INTERVENTIONS:  - Assess and monitor for signs and symptoms of infection  - Monitor lab/diagnostic results  - Monitor all insertion sites, i.e. indwelling lines, tubes, and drains  - Monitor endotracheal if appropriate and nasal secretions for changes in amount and color  - [place] appropriate cooling/warming therapies per order  - Administer medications as ordered  - Instruct and encourage patient and family to use good hand hygiene technique  - Identify and instruct in appropriate isolation precautions for identified infection/condition  Outcome: Adequate for Discharge  Goal: Absence of fever/infection during neutropenic period  Description: INTERVENTIONS:  - Monitor WBC    Outcome: Adequate for Discharge     Problem: DISCHARGE PLANNING  Goal: Discharge to home or other facility with appropriate resources  Description: INTERVENTIONS:  - Identify barriers to discharge w/patient and caregiver  - Arrange for needed discharge resources and transportation as appropriate  - Identify discharge learning needs (meds, wound care, etc.)  - Arrange for interpretive services to assist at discharge as needed  - Refer to Case Management Department for coordinating discharge planning if the patient needs post-hospital services  based on physician/advanced practitioner order or complex needs related to functional status, cognitive ability, or social support system  Outcome: Adequate for Discharge     Problem: Nutrition/Hydration-ADULT  Goal: Nutrient/Hydration intake appropriate for improving, restoring or maintaining nutritional needs  Description: Monitor and assess patient's nutrition/hydration status for malnutrition. Collaborate with interdisciplinary team and initiate plan and interventions as ordered.  Monitor patient's weight and dietary intake as ordered or per policy. Utilize nutrition screening tool and intervene as necessary. Determine patient's food preferences and provide high-protein, high-caloric foods as appropriate.     INTERVENTIONS:  - Monitor oral intake, urinary output, labs, and treatment plans  - Assess nutrition and hydration status and recommend course of action  - Evaluate amount of meals eaten  - Assist patient with eating if necessary   - Allow adequate time for meals  - Recommend/ encourage appropriate diets, oral nutritional supplements, and vitamin/mineral supplements  - Order, calculate, and assess calorie counts as needed  - Recommend, monitor, and adjust tube feedings and TPN/PPN based on assessed needs  - Assess need for intravenous fluids  - Provide specific nutrition/hydration education as appropriate  - Include patient/family/caregiver in decisions related to nutrition  Outcome: Adequate for Discharge

## 2024-05-24 NOTE — NURSING NOTE
Patient and spouse given all discharge instructions. All medications, follow up appts, and instructions gone over. All medical supplies delivered for patient. All belongings with patient. Explained safety with transfers and PT explained same. Patient to  nebulizer to continue treatments at home. [facility] My chart gone over. Patient assisted down to car via wheelchair by PT which interdisciplinary team determined safest means of transportation.

## 2024-05-24 NOTE — PROGRESS NOTES
[facility] SLP DISCHARGE NOTE    05/24/24 1300   Pain Assessment   Pain Assessment Tool 0-10   Pain Score No Pain   Restrictions/Precautions   Precautions Bed/chair alarms;Cognitive;Fall Risk;Aspiration;Supervision on toilet/commode   Cognitive Linguisitic Assessments   The Repeatable Battery for the Assessment of Neuropsychological Status (RBANS)     Administered 5/2/24    The Repeatable Battery for the Assessment of Neuropsychological Status (RBANS) is a brief, individually-administered assessment which measures attention, language, visuospatial/constructional abilities, and immediate & delayed memory. The RBANS is intended for use with adolescents to adults, ages 12 to 89 years. The following results were obtained during the administration of the assessment.     Form: A  “IE” indicates the scores from the initial evaluation (4/4/23). Form: A     Cognitive Domain/Subtest: Index Score: Percentile Rank: Classification: IE Index Score: Status:   IMMEDIATE MEMORY 81 10%ile Low Average 103 DECLINE        1. List Learning (21/40)            2. Story Memory (12/24)               VISUOSPATIAL/  CONSTRUCTIONAL 102 55%ile Average 109 NO CHANGE        3. Figure Copy (20/20)            4. Line Orientation (13/20)               LANGUAGE 82 12%ile Low Average 85 NO CHANGE        5. Picture Naming (10/10)            6. Semantic Fluency (8/40)               ATTENTION 82 12%ile Low Average 91 DECLINE        7. Digit Span (8/16)            8. Coding (28/89)               DELAYED MEMORY 101 53%ile Average 112 DECLINE        9. List Recall (5/10)            10. List Recognition (19/20)            11. Story Recall (7/12)            12. Figure Recall (13/20)                Sum of Index Scores:  448   500 DECLINE   Total Score:  85         Percentile: 16%ile         Classification: Low Average                 Comprehension   Comprehension (FIM) 6 - Has only MILD difficulty with complex/abstract info   Expression   Expression (FIM) 6  - Expresses complex/abstract but requires:  more time   Social Interaction   Social Interaction (FIM) 6 - Interacts appropriately with others BUT requires extra  time   Problem Solving   Problem solving (FIM) 6 - Solves complex problems BUT requires extra time   Memory   Memory (FIM) 6 - Recognizes with extra time   Memory Skills   Orientation Level Oriented X4   Language Assessments   [place] Diagnostic Aphasia Exam (BDAE) Administered 5/3/24   BDAE short form test 14/15    Test of Adolescent/Adult Word Finding 2 (TAWF 2)  Administerd 5/16/24     Test of Adolescent/Adult Word Finding 2 (TAWF 2) administered      The The of Adolescent/Adult Word Finding-2 (TAWF-2) is desinged a standardized assessment to help assess word finding problems in adolescents and adults ( ages 12-80). It consists of 5 naming sections: picture naming nouns, sentence completion naming, description naming, picture naming verbs and naming to categories. Scoring can be completed in a Complete Test version or in a Brief test option.        Picture naming:nouns raw score 21   Sentence completion naming  12   Picture naming : verbs 18   Picture naming: word groups 21   Total raw score 72   Word finding index 107   Percentile rank 68%   Descriptive term  Average      Speech/Language/Cognition Assessmetn   Treatment Assessment Pt pending discharge home today with his wife. Pt will be transitioning to homecare services with recommendations to continue speech therapy. Reviewed and provided with memory strategy packet.   Recommendations   Diet Solid Recommendation Regular consistency  (choose softer items as needed)   Diet Liquid Recommendation Thin liquid   Recommended Form of Meds Whole;With puree  (pt was previously tolerating whole with thin liquids)   General Precautions Aspiration precautions;Upright as possible for all oral intake;Remain upright for 45 mins after meals   Compensatory Swallowing Strategies Alternate solids and liquids   Eating  "  Type of Assistance Needed Independent   Physical Assistance Level No physical assistance   Eating CARE Score 6   Swallow Assessment   Swallow Treatment Assessment He reports new onset of \"restricted\" feeling in his throat 2' chemotherapy over the last two sessions. Patient reports eating less overall and decreasing selected items to find what accomodates this discomfort.  Education provided regarding food options for return home, though patient continues that he also has trouble with water and shakes at times, \"it just seems to aggravate it.\"  He is not demonstrating clinical overt s.s of aspiration observed or staff or in session. Continue to monitor diet tolerance.   SLP Therapy Minutes   SLP Time In 1300   SLP Time Out 1400   SLP Total Time (minutes) 60   SLP Mode of treatment - Individual (minutes) 60   SLP Mode of treatment - Concurrent (minutes) 0   SLP Mode of treatment - Group (minutes) 0   SLP Mode of treatment - Co-treat (minutes) 0   SLP Mode of Treatment - Total time(minutes) 60 minutes   SLP Cumulative Minutes 1050       "

## 2024-05-24 NOTE — PLAN OF CARE
Problem: Nutrition/Hydration-ADULT  Goal: Nutrient/Hydration intake appropriate for improving, restoring or maintaining nutritional needs  Description: Monitor and assess patient's nutrition/hydration status for malnutrition. Collaborate with interdisciplinary team and initiate plan and interventions as ordered.  Monitor patient's weight and dietary intake as ordered or per policy. Utilize nutrition screening tool and intervene as necessary. Determine patient's food preferences and provide high-protein, high-caloric foods as appropriate.     INTERVENTIONS:  - Monitor oral intake, urinary output, labs, and treatment plans  - Assess nutrition and hydration status and recommend course of action  - Evaluate amount of meals eaten  - Assist patient with eating if necessary   - Allow adequate time for meals  - Recommend/ encourage appropriate diets, oral nutritional supplements, and vitamin/mineral supplements  - Order, calculate, and assess calorie counts as needed  - Recommend, monitor, and adjust tube feedings and TPN/PPN based on assessed needs  - Assess need for intravenous fluids  - Provide specific nutrition/hydration education as appropriate  - Include patient/family/caregiver in decisions related to nutrition  Outcome: Adequate for Discharge

## 2024-05-24 NOTE — PHYSICAL THERAPY NOTE
PT DISCHARGE SUMMARY:      Patient has met max benefit for inpatient ARC PT.  Patient MIN-MAX A bed mobility,  MIN - MOD A STS with PFRW, MOD A SPT with PFRW, MIN-MOD A sit pivot transfers, MOD I wheelchair mobility level and unlevel surfaces, MIN-MOD A ambulation level and unlevel surfaces with PFRW.  Patient remains limited by decreased ROM/strength, decreased balance and safety, decreased endurance.  Pt's wife completed multiple family trainings prior to d/c.  Recommend using wheelchair as primary mode of locomotion and walk with [facility] PT only.  Also recommend second person to assist wife with transfers until she is comfortable completing alone.   Patient MOD A car transfer using sit pivot transfer from wheelchair.  Patient for d/c today to home with wife and continued PT services.

## 2024-05-24 NOTE — DISCHARGE INSTR - AVS FIRST PAGE
Rehabilitation Instructions:     ACTIVITY:   Please follow mobility, self care instructions as your were taught by inpatient rehabilitation therapists.    Please continue using RW, wheelchair, and adaptive equipment.    You will continue your rehabilitation with home care RN, PT, OT, SLP.       RESTRICTIONS:  No Driving - You were reported to [employer] by neurology as required by law due to seizures

## 2024-05-26 ENCOUNTER — HOME CARE VISIT (OUTPATIENT)
Dept: HOME HEALTH SERVICES | Facility: HOME HEALTHCARE | Age: 69
End: 2024-05-26
Payer: COMMERCIAL

## 2024-05-26 PROCEDURE — G0299 HHS/HOSPICE OF RN EA 15 MIN: HCPCS

## 2024-05-26 PROCEDURE — 400013 VN SOC

## 2024-05-28 ENCOUNTER — TRANSITIONAL CARE MANAGEMENT (OUTPATIENT)
Dept: FAMILY MEDICINE CLINIC | Facility: CLINIC | Age: 69
End: 2024-05-28

## 2024-05-28 VITALS
OXYGEN SATURATION: 93 % | TEMPERATURE: 97.6 F | SYSTOLIC BLOOD PRESSURE: 142 MMHG | HEART RATE: 66 BPM | RESPIRATION RATE: 20 BRPM | DIASTOLIC BLOOD PRESSURE: 72 MMHG

## 2024-05-28 NOTE — CASE COMMUNICATION
[facility] to [facility] Health service with the following disciplines:      SN, PT and OT patient declined need for HHA  This report is informational only, no response is needed  Primary focus of home health care__neuro assessment  Patient stated goals of care__to get back to my gallery  Anticipated visit pattern and next visit date__3w1__2w3  See medication list - meds in home differ from AVS__all medications i n the home  Significant clinical findings__patient still bringing up thick mucous he reports it has been this way for a few weeks. lung sounds present with rhonchi bilaterally with an exp.wheeze. we dicussed importance of use of nebulizer tx and flutter valve/incentive spirometer  Potential barriers to goal achievement__cancer progression  Other pertinent information__patients spouse is asking if he should be using a saline nebulizer      Thank you for allowing us to participate in the care of your patient.      Isabela Boyer RN

## 2024-05-28 NOTE — CASE MANAGEMENT
Patient progressed well in ARC. Patient discharged to (art studio)home with wife.  Bed delivered to home. Wheelchair delivered to patient's room 213. [facility] with [facility] reserved for RN/PT/OT, to follow up with ST when transitions to outpt therapies.

## 2024-05-28 NOTE — PROGRESS NOTES
05/28/24 1246   Hello, [Guardian’s Name / Patient’s Name], this is [Caller Name] from [facility], and our clinical care team wanted to check on you / your child after your recent visit to the hospital. It will only take 3-5 minutes. Is this a good time?   Discharge Call Type/ Specific Diagnosis: General Call;ARC General   ARC Discharge Follow- Up   ARC Follow- Up Time Frame 5 Day follow up   ARC 5 Day General Follow- up Questions   Patient location at time of call Home   Were you/ your loved one's discharge instructions clear and understandable Yes   Have you Filled you/ your loved one's new prescriptions Yes   Do you have questions about your medications? No   Are you/ your loved one taking all medications as prescribed? Yes   Are you/ your loved ones having any unusual symptoms or problems? No   Follow up appointments   Follow up appointment with PCP Future appointment scheduled   Follow up appointment with Physical Medicine and Rehab Doctor Future appointment scheduled   Healthy Lifestyle   Are you participating in ongoing therapy? Yes   Call Complete   Attempted Number of Calls 1   Discharge phone call complete? Complete

## 2024-05-29 ENCOUNTER — TELEPHONE (OUTPATIENT)
Dept: HEMATOLOGY ONCOLOGY | Facility: CLINIC | Age: 69
End: 2024-05-29

## 2024-05-29 ENCOUNTER — HOME CARE VISIT (OUTPATIENT)
Dept: HOME HEALTH SERVICES | Facility: HOME HEALTHCARE | Age: 69
End: 2024-05-29
Payer: COMMERCIAL

## 2024-05-29 ENCOUNTER — HOSPITAL ENCOUNTER (OUTPATIENT)
Dept: INFUSION CENTER | Facility: HOSPITAL | Age: 69
Discharge: HOME | End: 2024-05-29
Payer: COMMERCIAL

## 2024-05-29 VITALS
DIASTOLIC BLOOD PRESSURE: 64 MMHG | TEMPERATURE: 97.3 F | SYSTOLIC BLOOD PRESSURE: 122 MMHG | RESPIRATION RATE: 20 BRPM | OXYGEN SATURATION: 92 % | HEART RATE: 78 BPM

## 2024-05-29 VITALS — SYSTOLIC BLOOD PRESSURE: 124 MMHG | DIASTOLIC BLOOD PRESSURE: 66 MMHG | HEART RATE: 76 BPM | OXYGEN SATURATION: 96 %

## 2024-05-29 VITALS — HEART RATE: 76 BPM | SYSTOLIC BLOOD PRESSURE: 124 MMHG | DIASTOLIC BLOOD PRESSURE: 66 MMHG | OXYGEN SATURATION: 96 %

## 2024-05-29 DIAGNOSIS — Z45.2 ENCOUNTER FOR CENTRAL LINE CARE: ICD-10-CM

## 2024-05-29 DIAGNOSIS — C79.9 METASTATIC ADENOCARCINOMA (HCC): ICD-10-CM

## 2024-05-29 DIAGNOSIS — D70.1 CHEMOTHERAPY-INDUCED NEUTROPENIA (HCC): ICD-10-CM

## 2024-05-29 DIAGNOSIS — T45.1X5A CHEMOTHERAPY-INDUCED NEUTROPENIA (HCC): ICD-10-CM

## 2024-05-29 DIAGNOSIS — C34.91 CARCINOMA OF RIGHT LUNG (HCC): Primary | ICD-10-CM

## 2024-05-29 LAB
ALBUMIN SERPL BCP-MCNC: 4 G/DL (ref 3.5–5)
ALP SERPL-CCNC: 68 U/L (ref 34–104)
ALT SERPL W P-5'-P-CCNC: 20 U/L (ref 7–52)
ANION GAP SERPL CALCULATED.3IONS-SCNC: 7 MMOL/L (ref 4–13)
AST SERPL W P-5'-P-CCNC: 19 U/L (ref 13–39)
BILIRUB SERPL-MCNC: 0.67 MG/DL (ref 0.2–1)
BILIRUB UR QL STRIP: NEGATIVE
BUN SERPL-MCNC: 11 MG/DL (ref 5–25)
CALCIUM SERPL-MCNC: 8.7 MG/DL (ref 8.4–10.2)
CHLORIDE SERPL-SCNC: 100 MMOL/L (ref 96–108)
CLARITY UR: CLEAR
CO2 SERPL-SCNC: 31 MMOL/L (ref 21–32)
COLOR UR: YELLOW
CREAT SERPL-MCNC: 0.79 MG/DL (ref 0.6–1.3)
ERYTHROCYTE [DISTWIDTH] IN BLOOD BY AUTOMATED COUNT: 15.4 % (ref 11.6–15.1)
GFR SERPL CREATININE-BSD FRML MDRD: 90 ML/MIN/1.73SQ M
GLUCOSE SERPL-MCNC: 294 MG/DL (ref 65–140)
GLUCOSE UR STRIP-MCNC: ABNORMAL MG/DL
HCT VFR BLD AUTO: 36.2 % (ref 36.5–49.3)
HGB BLD-MCNC: 11.7 G/DL (ref 12–17)
HGB UR QL STRIP.AUTO: NEGATIVE
KETONES UR STRIP-MCNC: NEGATIVE MG/DL
LEUKOCYTE ESTERASE UR QL STRIP: NEGATIVE
MCH RBC QN AUTO: 32.9 PG (ref 26.8–34.3)
MCHC RBC AUTO-ENTMCNC: 32.3 G/DL (ref 31.4–37.4)
MCV RBC AUTO: 102 FL (ref 82–98)
NITRITE UR QL STRIP: NEGATIVE
PH UR STRIP.AUTO: 6 [PH]
PLATELET # BLD AUTO: 136 THOUSANDS/UL (ref 149–390)
PMV BLD AUTO: 9.9 FL (ref 8.9–12.7)
POTASSIUM SERPL-SCNC: 4.1 MMOL/L (ref 3.5–5.3)
PROT SERPL-MCNC: 6.5 G/DL (ref 6.4–8.4)
PROT UR STRIP-MCNC: NEGATIVE MG/DL
RBC # BLD AUTO: 3.56 MILLION/UL (ref 3.88–5.62)
SODIUM SERPL-SCNC: 138 MMOL/L (ref 135–147)
SP GR UR STRIP.AUTO: 1.01
UROBILINOGEN UR QL STRIP.AUTO: 0.2 E.U./DL
WBC # BLD AUTO: 21.6 THOUSAND/UL (ref 4.31–10.16)

## 2024-05-29 PROCEDURE — 85027 COMPLETE CBC AUTOMATED: CPT | Performed by: INTERNAL MEDICINE

## 2024-05-29 PROCEDURE — G0299 HHS/HOSPICE OF RN EA 15 MIN: HCPCS

## 2024-05-29 PROCEDURE — 80053 COMPREHEN METABOLIC PANEL: CPT | Performed by: INTERNAL MEDICINE

## 2024-05-29 PROCEDURE — G0151 HHCP-SERV OF PT,EA 15 MIN: HCPCS

## 2024-05-29 PROCEDURE — 85007 BL SMEAR W/DIFF WBC COUNT: CPT | Performed by: INTERNAL MEDICINE

## 2024-05-29 PROCEDURE — G0152 HHCP-SERV OF OT,EA 15 MIN: HCPCS

## 2024-05-29 PROCEDURE — 81003 URINALYSIS AUTO W/O SCOPE: CPT | Performed by: INTERNAL MEDICINE

## 2024-05-29 NOTE — PROGRESS NOTES
Paras Pitts  tolerated port flush well with no complications. Central labs drawn via port and urine specimen obtained. Confirmed with Shanell Moreira RN that pt needs CBC, CMP, and UA for tx tomorrow.    Paras Pitts is aware of future appt tomorrow at 12:30PM.     AVS declined by Paras Pitts.

## 2024-05-29 NOTE — TELEPHONE ENCOUNTER
Phoned patient/spouse to have labs/urine completed for tomorrow treatment.  Aware and agreeable to have labs completed at infusion center today

## 2024-05-29 NOTE — CASE COMMUNICATION
OT evaluation completed 5/29/24.  Plan to continue OT 1wk1, 2wk3 to address LUE strengthening/fine motor coordination, HEP, ADL's, functional transfers, and safety.

## 2024-05-30 ENCOUNTER — HOSPITAL ENCOUNTER (OUTPATIENT)
Dept: INFUSION CENTER | Facility: HOSPITAL | Age: 69
End: 2024-05-30
Attending: INTERNAL MEDICINE
Payer: COMMERCIAL

## 2024-05-30 VITALS
WEIGHT: 199.3 LBS | SYSTOLIC BLOOD PRESSURE: 148 MMHG | RESPIRATION RATE: 18 BRPM | HEIGHT: 69 IN | TEMPERATURE: 97.8 F | BODY MASS INDEX: 29.52 KG/M2 | DIASTOLIC BLOOD PRESSURE: 82 MMHG | HEART RATE: 82 BPM

## 2024-05-30 DIAGNOSIS — D70.1 CHEMOTHERAPY-INDUCED NEUTROPENIA (HCC): ICD-10-CM

## 2024-05-30 DIAGNOSIS — C79.9 METASTATIC ADENOCARCINOMA (HCC): Primary | ICD-10-CM

## 2024-05-30 DIAGNOSIS — T45.1X5A CHEMOTHERAPY-INDUCED NEUTROPENIA (HCC): ICD-10-CM

## 2024-05-30 DIAGNOSIS — C34.91 CARCINOMA OF RIGHT LUNG (HCC): ICD-10-CM

## 2024-05-30 LAB
ANISOCYTOSIS BLD QL SMEAR: PRESENT
LYMPHOCYTES # BLD AUTO: 6 % (ref 14–44)
METAMYELOCYTES NFR BLD MANUAL: 7 % (ref 0–1)
MONOCYTES NFR BLD: 3 % (ref 4–12)
MYELOCYTES NFR BLD MANUAL: 4 % (ref 0–1)
NEUTS BAND NFR BLD MANUAL: 7 % (ref 0–8)
NEUTS SEG NFR BLD AUTO: 71 % (ref 43–75)
NRBC BLD AUTO-RTO: 2 /100 WBC (ref 0–2)
PATHOLOGY REVIEW: YES
PLATELET BLD QL SMEAR: ABNORMAL
POLYCHROMASIA BLD QL SMEAR: PRESENT
RBC MORPH BLD: PRESENT
TOTAL CELLS COUNTED SPEC: 100
TOXIC GRANULES BLD QL SMEAR: PRESENT
VARIANT LYMPHS # BLD AUTO: 2 %

## 2024-05-30 PROCEDURE — 85060 BLOOD SMEAR INTERPRETATION: CPT | Performed by: STUDENT IN AN ORGANIZED HEALTH CARE EDUCATION/TRAINING PROGRAM

## 2024-05-30 PROCEDURE — 96413 CHEMO IV INFUSION 1 HR: CPT

## 2024-05-30 RX ORDER — SODIUM CHLORIDE 9 MG/ML
20 INJECTION, SOLUTION INTRAVENOUS ONCE
Status: COMPLETED | OUTPATIENT
Start: 2024-05-30 | End: 2024-05-30

## 2024-05-30 RX ADMIN — BEVACIZUMAB 457.5 MG: 400 INJECTION, SOLUTION INTRAVENOUS at 14:05

## 2024-05-30 RX ADMIN — SODIUM CHLORIDE 20 ML/HR: 0.9 INJECTION, SOLUTION INTRAVENOUS at 14:30

## 2024-05-30 NOTE — PROGRESS NOTES
Paras Pitts  tolerated Avastin  well with no complications.  B/P checked after Avastin 148/82 Shanell Moreira made aware.     Paras Pitts is aware of future appt on 6-6-24 1230    AVS printed and given to Paras Pitts. Patient left in stable condition with his wife via wheelchair.

## 2024-05-30 NOTE — PROGRESS NOTES
B/P checked manually 150/80. Shanell Moreira RN made aware OK to give avastin today. She would like a B/P re-check at the completion of avastin

## 2024-05-31 ENCOUNTER — HOME CARE VISIT (OUTPATIENT)
Dept: HOME HEALTH SERVICES | Facility: HOME HEALTHCARE | Age: 69
End: 2024-05-31
Payer: COMMERCIAL

## 2024-05-31 PROCEDURE — G0299 HHS/HOSPICE OF RN EA 15 MIN: HCPCS

## 2024-05-31 RX ORDER — SODIUM CHLORIDE 9 MG/ML
20 INJECTION, SOLUTION INTRAVENOUS ONCE
Status: CANCELLED | OUTPATIENT
Start: 2024-06-06

## 2024-05-31 RX ORDER — SODIUM CHLORIDE 9 MG/ML
20 INJECTION, SOLUTION INTRAVENOUS ONCE
OUTPATIENT
Start: 2024-06-13

## 2024-05-31 NOTE — CASE COMMUNICATION
PT initial evaluation completed.  Plan to see 1xwkx1, [id number] to address functional deficits with ambulation, transfers including car transfers, provide caregiver education for safe mobility.

## 2024-06-03 ENCOUNTER — HOME CARE VISIT (OUTPATIENT)
Dept: HOME HEALTH SERVICES | Facility: HOME HEALTHCARE | Age: 69
End: 2024-06-03
Payer: COMMERCIAL

## 2024-06-03 VITALS — DIASTOLIC BLOOD PRESSURE: 80 MMHG | OXYGEN SATURATION: 98 % | HEART RATE: 68 BPM | SYSTOLIC BLOOD PRESSURE: 130 MMHG

## 2024-06-03 VITALS
DIASTOLIC BLOOD PRESSURE: 82 MMHG | RESPIRATION RATE: 20 BRPM | SYSTOLIC BLOOD PRESSURE: 126 MMHG | OXYGEN SATURATION: 97 % | HEART RATE: 73 BPM

## 2024-06-03 PROCEDURE — G0180 MD CERTIFICATION HHA PATIENT: HCPCS | Performed by: PHYSICAL MEDICINE & REHABILITATION

## 2024-06-03 PROCEDURE — G0152 HHCP-SERV OF OT,EA 15 MIN: HCPCS

## 2024-06-04 ENCOUNTER — HOME CARE VISIT (OUTPATIENT)
Dept: HOME HEALTH SERVICES | Facility: HOME HEALTHCARE | Age: 69
End: 2024-06-04
Payer: COMMERCIAL

## 2024-06-04 VITALS
OXYGEN SATURATION: 92 % | RESPIRATION RATE: 18 BRPM | TEMPERATURE: 98.2 F | DIASTOLIC BLOOD PRESSURE: 82 MMHG | SYSTOLIC BLOOD PRESSURE: 152 MMHG | HEART RATE: 86 BPM

## 2024-06-04 VITALS — OXYGEN SATURATION: 97 % | SYSTOLIC BLOOD PRESSURE: 136 MMHG | HEART RATE: 77 BPM | DIASTOLIC BLOOD PRESSURE: 84 MMHG

## 2024-06-04 PROCEDURE — G0299 HHS/HOSPICE OF RN EA 15 MIN: HCPCS

## 2024-06-04 PROCEDURE — G0151 HHCP-SERV OF PT,EA 15 MIN: HCPCS

## 2024-06-05 ENCOUNTER — HOSPITAL ENCOUNTER (OUTPATIENT)
Dept: INFUSION CENTER | Facility: HOSPITAL | Age: 69
Discharge: HOME | End: 2024-06-05
Payer: COMMERCIAL

## 2024-06-05 ENCOUNTER — HOME CARE VISIT (OUTPATIENT)
Dept: HOME HEALTH SERVICES | Facility: HOME HEALTHCARE | Age: 69
End: 2024-06-05
Payer: COMMERCIAL

## 2024-06-05 VITALS — OXYGEN SATURATION: 96 % | SYSTOLIC BLOOD PRESSURE: 120 MMHG | HEART RATE: 84 BPM | DIASTOLIC BLOOD PRESSURE: 80 MMHG

## 2024-06-05 DIAGNOSIS — C79.9 METASTATIC ADENOCARCINOMA (HCC): Primary | ICD-10-CM

## 2024-06-05 DIAGNOSIS — C34.91 CARCINOMA OF RIGHT LUNG (HCC): ICD-10-CM

## 2024-06-05 DIAGNOSIS — Z45.2 ENCOUNTER FOR CENTRAL LINE CARE: ICD-10-CM

## 2024-06-05 DIAGNOSIS — E03.2 DRUG-INDUCED HYPOTHYROIDISM: ICD-10-CM

## 2024-06-05 LAB
ALBUMIN SERPL BCP-MCNC: 3.8 G/DL (ref 3.5–5)
ALP SERPL-CCNC: 45 U/L (ref 34–104)
ALT SERPL W P-5'-P-CCNC: 20 U/L (ref 7–52)
ANION GAP SERPL CALCULATED.3IONS-SCNC: 5 MMOL/L (ref 4–13)
ANISOCYTOSIS BLD QL SMEAR: PRESENT
AST SERPL W P-5'-P-CCNC: 16 U/L (ref 13–39)
BASOPHILS # BLD MANUAL: 0 THOUSAND/UL (ref 0–0.1)
BASOPHILS NFR MAR MANUAL: 0 % (ref 0–1)
BILIRUB SERPL-MCNC: 0.51 MG/DL (ref 0.2–1)
BUN SERPL-MCNC: 17 MG/DL (ref 5–25)
CALCIUM SERPL-MCNC: 8.4 MG/DL (ref 8.4–10.2)
CHLORIDE SERPL-SCNC: 102 MMOL/L (ref 96–108)
CO2 SERPL-SCNC: 31 MMOL/L (ref 21–32)
CREAT SERPL-MCNC: 0.75 MG/DL (ref 0.6–1.3)
DACRYOCYTES BLD QL SMEAR: PRESENT
EOSINOPHIL # BLD MANUAL: 0 THOUSAND/UL (ref 0–0.4)
EOSINOPHIL NFR BLD MANUAL: 0 % (ref 0–6)
ERYTHROCYTE [DISTWIDTH] IN BLOOD BY AUTOMATED COUNT: 15.9 % (ref 11.6–15.1)
GFR SERPL CREATININE-BSD FRML MDRD: 92 ML/MIN/1.73SQ M
GLUCOSE SERPL-MCNC: 174 MG/DL (ref 65–140)
HCT VFR BLD AUTO: 36.8 % (ref 36.5–49.3)
HGB BLD-MCNC: 11.5 G/DL (ref 12–17)
LG PLATELETS BLD QL SMEAR: PRESENT
LYMPHOCYTES # BLD AUTO: 18 % (ref 14–44)
LYMPHOCYTES # BLD AUTO: 2.16 THOUSAND/UL (ref 0.6–4.47)
MACROCYTES BLD QL AUTO: PRESENT
MAGNESIUM SERPL-MCNC: 2 MG/DL (ref 1.9–2.7)
MCH RBC QN AUTO: 32.3 PG (ref 26.8–34.3)
MCHC RBC AUTO-ENTMCNC: 31.3 G/DL (ref 31.4–37.4)
MCV RBC AUTO: 103 FL (ref 82–98)
METAMYELOCYTE ABSOLUTE CT: 0.36 THOUSAND/UL (ref 0–0.1)
METAMYELOCYTES NFR BLD MANUAL: 3 % (ref 0–1)
MONOCYTES # BLD AUTO: 0.84 THOUSAND/UL (ref 0–1.22)
MONOCYTES NFR BLD: 7 % (ref 4–12)
MYELOCYTE ABSOLUTE CT: 0.36 THOUSAND/UL (ref 0–0.1)
MYELOCYTES NFR BLD MANUAL: 3 % (ref 0–1)
NEUTROPHILS # BLD MANUAL: 8.27 THOUSAND/UL (ref 1.85–7.62)
NEUTS BAND NFR BLD MANUAL: 6 % (ref 0–8)
NEUTS SEG NFR BLD AUTO: 63 % (ref 43–75)
OVALOCYTES BLD QL SMEAR: PRESENT
PLATELET # BLD AUTO: 201 THOUSANDS/UL (ref 149–390)
PLATELET BLD QL SMEAR: ADEQUATE
PMV BLD AUTO: 9.6 FL (ref 8.9–12.7)
POLYCHROMASIA BLD QL SMEAR: PRESENT
POTASSIUM SERPL-SCNC: 3.7 MMOL/L (ref 3.5–5.3)
PROT SERPL-MCNC: 5.9 G/DL (ref 6.4–8.4)
RBC # BLD AUTO: 3.56 MILLION/UL (ref 3.88–5.62)
RBC MORPH BLD: PRESENT
SODIUM SERPL-SCNC: 138 MMOL/L (ref 135–147)
TSH SERPL DL<=0.05 MIU/L-ACNC: 3.89 UIU/ML (ref 0.45–4.5)
WBC # BLD AUTO: 11.98 THOUSAND/UL (ref 4.31–10.16)

## 2024-06-05 PROCEDURE — 85027 COMPLETE CBC AUTOMATED: CPT

## 2024-06-05 PROCEDURE — 84443 ASSAY THYROID STIM HORMONE: CPT

## 2024-06-05 PROCEDURE — 85007 BL SMEAR W/DIFF WBC COUNT: CPT

## 2024-06-05 PROCEDURE — 80053 COMPREHEN METABOLIC PANEL: CPT

## 2024-06-05 PROCEDURE — 83735 ASSAY OF MAGNESIUM: CPT

## 2024-06-05 PROCEDURE — G0152 HHCP-SERV OF OT,EA 15 MIN: HCPCS

## 2024-06-05 NOTE — PROGRESS NOTES
Paras Pitts  tolerated lab draw and port flush well with no complications.      Paras Pitts is aware of future appt on 6/6 at 12:30PM.     AVS printed and given to Paras Pitts: No (Declined by Paras Pitts).      
Inpatient Physical Exam

## 2024-06-06 ENCOUNTER — HOSPITAL ENCOUNTER (OUTPATIENT)
Dept: INFUSION CENTER | Facility: HOSPITAL | Age: 69
End: 2024-06-06
Attending: INTERNAL MEDICINE
Payer: COMMERCIAL

## 2024-06-06 VITALS
SYSTOLIC BLOOD PRESSURE: 159 MMHG | BODY MASS INDEX: 31.12 KG/M2 | RESPIRATION RATE: 20 BRPM | HEIGHT: 69 IN | DIASTOLIC BLOOD PRESSURE: 84 MMHG | HEART RATE: 81 BPM | TEMPERATURE: 96.3 F | OXYGEN SATURATION: 97 % | WEIGHT: 210.1 LBS

## 2024-06-06 DIAGNOSIS — C79.9 METASTATIC ADENOCARCINOMA (HCC): Primary | ICD-10-CM

## 2024-06-06 DIAGNOSIS — T45.1X5A CHEMOTHERAPY-INDUCED NEUTROPENIA (HCC): ICD-10-CM

## 2024-06-06 DIAGNOSIS — C34.91 CARCINOMA OF RIGHT LUNG (HCC): ICD-10-CM

## 2024-06-06 DIAGNOSIS — D70.1 CHEMOTHERAPY-INDUCED NEUTROPENIA (HCC): ICD-10-CM

## 2024-06-06 PROCEDURE — 96367 TX/PROPH/DG ADDL SEQ IV INF: CPT

## 2024-06-06 PROCEDURE — 96377 APPLICATON ON-BODY INJECTOR: CPT

## 2024-06-06 PROCEDURE — 96413 CHEMO IV INFUSION 1 HR: CPT

## 2024-06-06 RX ORDER — SODIUM CHLORIDE 9 MG/ML
20 INJECTION, SOLUTION INTRAVENOUS ONCE
Status: COMPLETED | OUTPATIENT
Start: 2024-06-06 | End: 2024-06-06

## 2024-06-06 RX ADMIN — DEXAMETHASONE SODIUM PHOSPHATE: 10 INJECTION, SOLUTION INTRAMUSCULAR; INTRAVENOUS at 12:58

## 2024-06-06 RX ADMIN — PEGFILGRASTIM 6 MG: KIT SUBCUTANEOUS at 14:35

## 2024-06-06 RX ADMIN — DOCETAXEL 155.2 MG: 20 INJECTION, SOLUTION, CONCENTRATE INTRAVENOUS at 13:34

## 2024-06-06 RX ADMIN — SODIUM CHLORIDE 20 ML/HR: 9 INJECTION, SOLUTION INTRAVENOUS at 12:58

## 2024-06-06 NOTE — PROGRESS NOTES
Pt here for Taxotere infusion. Pt offered no acute complaints today. Vitals stable. Labs 6/5/24 reviewed and within parameter for tx today.   Pt resting comfortably in chair.

## 2024-06-06 NOTE — PROGRESS NOTES
Pt tolerated infusion well.   Neulasta on pro placed on pts left arm. Booklet given to family and pt.   Green blinking light noted. Aware due to infusion at 535pm tomorrow and to keep on until 635pm. No questions or concerns.  Pt/family aware of next appt at 6/13 at 1230.

## 2024-06-06 NOTE — PROGRESS NOTES
Pt needed appt for labs prior to tx on 6/13 from port.   Appt made on 6/12/24 at 1230.   Called pt twice left message on second call. Let pt know appt made at that date and time for labs and to call the infusion center back if that time does not work for him.

## 2024-06-07 ENCOUNTER — HOME CARE VISIT (OUTPATIENT)
Dept: HOME HEALTH SERVICES | Facility: HOME HEALTHCARE | Age: 69
End: 2024-06-07
Payer: COMMERCIAL

## 2024-06-07 VITALS — OXYGEN SATURATION: 94 % | SYSTOLIC BLOOD PRESSURE: 128 MMHG | DIASTOLIC BLOOD PRESSURE: 76 MMHG | HEART RATE: 82 BPM

## 2024-06-07 PROCEDURE — G0151 HHCP-SERV OF PT,EA 15 MIN: HCPCS

## 2024-06-08 ENCOUNTER — HOME CARE VISIT (OUTPATIENT)
Dept: HOME HEALTH SERVICES | Facility: HOME HEALTHCARE | Age: 69
End: 2024-06-08
Payer: COMMERCIAL

## 2024-06-10 ENCOUNTER — HOME CARE VISIT (OUTPATIENT)
Dept: HOME HEALTH SERVICES | Facility: HOME HEALTHCARE | Age: 69
End: 2024-06-10
Payer: COMMERCIAL

## 2024-06-10 VITALS — DIASTOLIC BLOOD PRESSURE: 80 MMHG | HEART RATE: 88 BPM | SYSTOLIC BLOOD PRESSURE: 134 MMHG | OXYGEN SATURATION: 92 %

## 2024-06-10 PROCEDURE — G0151 HHCP-SERV OF PT,EA 15 MIN: HCPCS

## 2024-06-11 ENCOUNTER — HOME CARE VISIT (OUTPATIENT)
Dept: HOME HEALTH SERVICES | Facility: HOME HEALTHCARE | Age: 69
End: 2024-06-11
Payer: COMMERCIAL

## 2024-06-11 VITALS
DIASTOLIC BLOOD PRESSURE: 72 MMHG | SYSTOLIC BLOOD PRESSURE: 126 MMHG | HEART RATE: 82 BPM | OXYGEN SATURATION: 94 % | RESPIRATION RATE: 20 BRPM | TEMPERATURE: 96.4 F

## 2024-06-11 VITALS — HEART RATE: 88 BPM | OXYGEN SATURATION: 98 % | SYSTOLIC BLOOD PRESSURE: 120 MMHG | DIASTOLIC BLOOD PRESSURE: 70 MMHG

## 2024-06-11 PROCEDURE — G0299 HHS/HOSPICE OF RN EA 15 MIN: HCPCS

## 2024-06-11 PROCEDURE — G0152 HHCP-SERV OF OT,EA 15 MIN: HCPCS

## 2024-06-12 ENCOUNTER — HOSPITAL ENCOUNTER (OUTPATIENT)
Dept: INFUSION CENTER | Facility: HOSPITAL | Age: 69
Discharge: HOME | End: 2024-06-12
Payer: COMMERCIAL

## 2024-06-12 VITALS — TEMPERATURE: 96.7 F

## 2024-06-12 DIAGNOSIS — T45.1X5A CHEMOTHERAPY-INDUCED NEUTROPENIA (HCC): ICD-10-CM

## 2024-06-12 DIAGNOSIS — C34.91 CARCINOMA OF RIGHT LUNG (HCC): ICD-10-CM

## 2024-06-12 DIAGNOSIS — Z45.2 ENCOUNTER FOR CENTRAL LINE CARE: ICD-10-CM

## 2024-06-12 DIAGNOSIS — C79.9 METASTATIC ADENOCARCINOMA (HCC): Primary | ICD-10-CM

## 2024-06-12 DIAGNOSIS — D70.1 CHEMOTHERAPY-INDUCED NEUTROPENIA (HCC): ICD-10-CM

## 2024-06-12 LAB
ALBUMIN SERPL BCP-MCNC: 3.9 G/DL (ref 3.5–5)
ALP SERPL-CCNC: 62 U/L (ref 34–104)
ALT SERPL W P-5'-P-CCNC: 20 U/L (ref 7–52)
ANION GAP SERPL CALCULATED.3IONS-SCNC: 7 MMOL/L (ref 4–13)
AST SERPL W P-5'-P-CCNC: 19 U/L (ref 13–39)
BASOPHILS # BLD MANUAL: 0 THOUSAND/UL (ref 0–0.1)
BASOPHILS NFR MAR MANUAL: 0 % (ref 0–1)
BILIRUB SERPL-MCNC: 1.73 MG/DL (ref 0.2–1)
BILIRUB UR QL STRIP: NEGATIVE
BUN SERPL-MCNC: 12 MG/DL (ref 5–25)
CALCIUM SERPL-MCNC: 8.6 MG/DL (ref 8.4–10.2)
CHLORIDE SERPL-SCNC: 100 MMOL/L (ref 96–108)
CLARITY UR: CLEAR
CO2 SERPL-SCNC: 31 MMOL/L (ref 21–32)
COLOR UR: YELLOW
CREAT SERPL-MCNC: 0.57 MG/DL (ref 0.6–1.3)
DOHLE BOD BLD QL SMEAR: PRESENT
EOSINOPHIL # BLD MANUAL: 0.12 THOUSAND/UL (ref 0–0.4)
EOSINOPHIL NFR BLD MANUAL: 2 % (ref 0–6)
ERYTHROCYTE [DISTWIDTH] IN BLOOD BY AUTOMATED COUNT: 14.4 % (ref 11.6–15.1)
GFR SERPL CREATININE-BSD FRML MDRD: 104 ML/MIN/1.73SQ M
GLUCOSE SERPL-MCNC: 142 MG/DL (ref 65–140)
GLUCOSE UR STRIP-MCNC: NEGATIVE MG/DL
HCT VFR BLD AUTO: 36.6 % (ref 36.5–49.3)
HGB BLD-MCNC: 12.2 G/DL (ref 12–17)
HGB UR QL STRIP.AUTO: NEGATIVE
KETONES UR STRIP-MCNC: NEGATIVE MG/DL
LEUKOCYTE ESTERASE UR QL STRIP: NEGATIVE
LYMPHOCYTES # BLD AUTO: 1.64 THOUSAND/UL (ref 0.6–4.47)
LYMPHOCYTES # BLD AUTO: 20 % (ref 14–44)
MAGNESIUM SERPL-MCNC: 1.8 MG/DL (ref 1.9–2.7)
MCH RBC QN AUTO: 32.4 PG (ref 26.8–34.3)
MCHC RBC AUTO-ENTMCNC: 33.3 G/DL (ref 31.4–37.4)
MCV RBC AUTO: 97 FL (ref 82–98)
METAMYELOCYTE ABSOLUTE CT: 0.18 THOUSAND/UL (ref 0–0.1)
METAMYELOCYTES NFR BLD MANUAL: 3 % (ref 0–1)
MONOCYTES # BLD AUTO: 0.55 THOUSAND/UL (ref 0–1.22)
MONOCYTES NFR BLD: 9 % (ref 4–12)
MYELOCYTE ABSOLUTE CT: 0.12 THOUSAND/UL (ref 0–0.1)
MYELOCYTES NFR BLD MANUAL: 2 % (ref 0–1)
NEUTROPHILS # BLD MANUAL: 3.46 THOUSAND/UL (ref 1.85–7.62)
NEUTS BAND NFR BLD MANUAL: 11 % (ref 0–8)
NEUTS SEG NFR BLD AUTO: 46 % (ref 43–75)
NITRITE UR QL STRIP: NEGATIVE
PH UR STRIP.AUTO: 7 [PH]
PLATELET # BLD AUTO: 99 THOUSANDS/UL (ref 149–390)
PLATELET BLD QL SMEAR: ABNORMAL
PMV BLD AUTO: 11.3 FL (ref 8.9–12.7)
POLYCHROMASIA BLD QL SMEAR: PRESENT
POTASSIUM SERPL-SCNC: 3.6 MMOL/L (ref 3.5–5.3)
PROT SERPL-MCNC: 5.9 G/DL (ref 6.4–8.4)
PROT UR STRIP-MCNC: NEGATIVE MG/DL
RBC # BLD AUTO: 3.77 MILLION/UL (ref 3.88–5.62)
RBC MORPH BLD: PRESENT
SODIUM SERPL-SCNC: 138 MMOL/L (ref 135–147)
SP GR UR STRIP.AUTO: 1.01
TOXIC GRANULES BLD QL SMEAR: PRESENT
UROBILINOGEN UR QL STRIP.AUTO: 2 E.U./DL
VARIANT LYMPHS # BLD AUTO: 7 %
WBC # BLD AUTO: 6.07 THOUSAND/UL (ref 4.31–10.16)

## 2024-06-12 PROCEDURE — 85027 COMPLETE CBC AUTOMATED: CPT | Performed by: INTERNAL MEDICINE

## 2024-06-12 PROCEDURE — 81003 URINALYSIS AUTO W/O SCOPE: CPT | Performed by: INTERNAL MEDICINE

## 2024-06-12 PROCEDURE — 83735 ASSAY OF MAGNESIUM: CPT

## 2024-06-12 PROCEDURE — 80053 COMPREHEN METABOLIC PANEL: CPT | Performed by: INTERNAL MEDICINE

## 2024-06-12 PROCEDURE — 85007 BL SMEAR W/DIFF WBC COUNT: CPT | Performed by: INTERNAL MEDICINE

## 2024-06-12 NOTE — PROGRESS NOTES
Paras Pitts  tolerated treatment well with no complications.  Labs obtained via port as ordered without difficulty.  Port flushed per protocol.    Paras Pitts is aware of future appt on 6/13 at 12:30 pm.     AVS declined by Paras Pitts.

## 2024-06-13 ENCOUNTER — HOSPITAL ENCOUNTER (OUTPATIENT)
Dept: INFUSION CENTER | Facility: HOSPITAL | Age: 69
End: 2024-06-13
Attending: INTERNAL MEDICINE
Payer: COMMERCIAL

## 2024-06-13 ENCOUNTER — HOME CARE VISIT (OUTPATIENT)
Dept: HOME HEALTH SERVICES | Facility: HOME HEALTHCARE | Age: 69
End: 2024-06-13
Payer: COMMERCIAL

## 2024-06-13 VITALS
BODY MASS INDEX: 29.62 KG/M2 | DIASTOLIC BLOOD PRESSURE: 76 MMHG | RESPIRATION RATE: 18 BRPM | SYSTOLIC BLOOD PRESSURE: 116 MMHG | OXYGEN SATURATION: 94 % | TEMPERATURE: 97.1 F | HEART RATE: 87 BPM | WEIGHT: 200 LBS | HEIGHT: 69 IN

## 2024-06-13 VITALS — SYSTOLIC BLOOD PRESSURE: 120 MMHG | DIASTOLIC BLOOD PRESSURE: 80 MMHG | HEART RATE: 88 BPM | OXYGEN SATURATION: 96 %

## 2024-06-13 DIAGNOSIS — T45.1X5A CHEMOTHERAPY-INDUCED NEUTROPENIA (HCC): ICD-10-CM

## 2024-06-13 DIAGNOSIS — Z45.2 ENCOUNTER FOR CENTRAL LINE CARE: ICD-10-CM

## 2024-06-13 DIAGNOSIS — C79.9 METASTATIC ADENOCARCINOMA (HCC): Primary | ICD-10-CM

## 2024-06-13 DIAGNOSIS — D70.1 CHEMOTHERAPY-INDUCED NEUTROPENIA (HCC): ICD-10-CM

## 2024-06-13 DIAGNOSIS — C34.91 CARCINOMA OF RIGHT LUNG (HCC): ICD-10-CM

## 2024-06-13 DIAGNOSIS — K13.79 MOUTH SORES: Primary | ICD-10-CM

## 2024-06-13 PROBLEM — R05.9 COUGH: Status: RESOLVED | Noted: 2024-05-14 | Resolved: 2024-06-13

## 2024-06-13 PROCEDURE — 96367 TX/PROPH/DG ADDL SEQ IV INF: CPT

## 2024-06-13 PROCEDURE — 96413 CHEMO IV INFUSION 1 HR: CPT

## 2024-06-13 PROCEDURE — G0152 HHCP-SERV OF OT,EA 15 MIN: HCPCS

## 2024-06-13 RX ORDER — SODIUM CHLORIDE 9 MG/ML
20 INJECTION, SOLUTION INTRAVENOUS ONCE
Status: COMPLETED | OUTPATIENT
Start: 2024-06-13 | End: 2024-06-13

## 2024-06-13 RX ORDER — MAGNESIUM SULFATE HEPTAHYDRATE 40 MG/ML
2 INJECTION, SOLUTION INTRAVENOUS ONCE
Status: COMPLETED | OUTPATIENT
Start: 2024-06-13 | End: 2024-06-13

## 2024-06-13 RX ADMIN — SODIUM CHLORIDE 20 ML/HR: 9 INJECTION, SOLUTION INTRAVENOUS at 14:23

## 2024-06-13 RX ADMIN — MAGNESIUM SULFATE IN WATER 2 G: 40 INJECTION, SOLUTION INTRAVENOUS at 12:53

## 2024-06-13 RX ADMIN — BEVACIZUMAB 457.5 MG: 400 INJECTION, SOLUTION INTRAVENOUS at 14:25

## 2024-06-13 NOTE — PROGRESS NOTES
Pt presents with mouth sores after last infusion treatment. Magic mouthwash to be ordered from Dr Langford's office. Per Shanell Moreira RN, pt ok to proceed with Avastin today.

## 2024-06-13 NOTE — PROGRESS NOTES
Paras Pitts  tolerated avastin and IV magnesium infusions well with no complications.      Paras Pitts is aware of future appt on 6/26 at 11:30AM.     AVS printed and given to Paras Pitts.

## 2024-06-14 ENCOUNTER — HOME CARE VISIT (OUTPATIENT)
Dept: HOME HEALTH SERVICES | Facility: HOME HEALTHCARE | Age: 69
End: 2024-06-14
Payer: COMMERCIAL

## 2024-06-14 VITALS
SYSTOLIC BLOOD PRESSURE: 118 MMHG | OXYGEN SATURATION: 96 % | RESPIRATION RATE: 20 BRPM | HEART RATE: 78 BPM | TEMPERATURE: 97.2 F | DIASTOLIC BLOOD PRESSURE: 74 MMHG

## 2024-06-14 PROCEDURE — G0299 HHS/HOSPICE OF RN EA 15 MIN: HCPCS

## 2024-06-14 PROCEDURE — G0151 HHCP-SERV OF PT,EA 15 MIN: HCPCS

## 2024-06-14 RX ORDER — DIPHENHYDRAMINE HYDROCHLORIDE AND LIDOCAINE HYDROCHLORIDE AND ALUMINUM HYDROXIDE AND MAGNESIUM HYDRO
10 KIT EVERY 4 HOURS PRN
Qty: 480 ML | Refills: 3 | Status: SHIPPED | OUTPATIENT
Start: 2024-06-14

## 2024-06-14 NOTE — Clinical Note
I will give him a NOMNC on 6/18 and plan to do agency d.c on 6/21.  ----- Message -----  From: Mary Turner, PT  Sent: 6/17/2024   5:59 AM EDT  To: Pia Mercado RN; Jaclyn Simon, OT      Plan to dc from PT this week.  discussed transition to outpatient therapy once discharged from agency.

## 2024-06-16 VITALS — HEART RATE: 89 BPM | OXYGEN SATURATION: 94 % | SYSTOLIC BLOOD PRESSURE: 122 MMHG | DIASTOLIC BLOOD PRESSURE: 66 MMHG

## 2024-06-17 ENCOUNTER — HOME CARE VISIT (OUTPATIENT)
Dept: HOME HEALTH SERVICES | Facility: HOME HEALTHCARE | Age: 69
End: 2024-06-17
Payer: COMMERCIAL

## 2024-06-17 VITALS — SYSTOLIC BLOOD PRESSURE: 130 MMHG | HEART RATE: 93 BPM | OXYGEN SATURATION: 92 % | DIASTOLIC BLOOD PRESSURE: 78 MMHG

## 2024-06-17 VITALS — SYSTOLIC BLOOD PRESSURE: 110 MMHG | DIASTOLIC BLOOD PRESSURE: 70 MMHG | OXYGEN SATURATION: 91 % | HEART RATE: 85 BPM

## 2024-06-17 PROCEDURE — G0152 HHCP-SERV OF OT,EA 15 MIN: HCPCS

## 2024-06-17 PROCEDURE — G0151 HHCP-SERV OF PT,EA 15 MIN: HCPCS

## 2024-06-17 NOTE — CASE COMMUNICATION
Plan to dc from PT this week.  discussed transition to outpatient therapy once discharged from agency.

## 2024-06-18 ENCOUNTER — HOME CARE VISIT (OUTPATIENT)
Dept: HOME HEALTH SERVICES | Facility: HOME HEALTHCARE | Age: 69
End: 2024-06-18
Payer: COMMERCIAL

## 2024-06-18 ENCOUNTER — TELEPHONE (OUTPATIENT)
Dept: NEUROLOGY | Facility: CLINIC | Age: 69
End: 2024-06-18

## 2024-06-18 VITALS
DIASTOLIC BLOOD PRESSURE: 78 MMHG | OXYGEN SATURATION: 97 % | RESPIRATION RATE: 20 BRPM | SYSTOLIC BLOOD PRESSURE: 130 MMHG | TEMPERATURE: 97.2 F | HEART RATE: 84 BPM

## 2024-06-18 DIAGNOSIS — G93.89 BRAIN MASS: ICD-10-CM

## 2024-06-18 DIAGNOSIS — Z51.11 ENCOUNTER FOR CHEMOTHERAPY MANAGEMENT: ICD-10-CM

## 2024-06-18 PROCEDURE — G0299 HHS/HOSPICE OF RN EA 15 MIN: HCPCS

## 2024-06-18 RX ORDER — DEXAMETHASONE 4 MG/1
8 TABLET ORAL 2 TIMES DAILY WITH MEALS
Qty: 12 TABLET | Refills: 6 | Status: SHIPPED | OUTPATIENT
Start: 2024-06-18

## 2024-06-18 NOTE — CASE COMMUNICATION
Plan for upcoming discharge from  PT with last visit planned for 6/19/2024.  Pt plans to transition to outpatient therapy.  If agreeable, will you please place order in chart for Outpatient Physical Therapy.

## 2024-06-18 NOTE — TELEPHONE ENCOUNTER
SLB/4/22-5/1/Seizure like activity    Paras Pitts will need follow up in in 4 weeks with epilepsy Attending .  He will not require outpatient neurological testing.       HFU scheduled for 11/15 @ 11:00 with Dr. Guill[place] in [place].   Added to wait list.

## 2024-06-19 ENCOUNTER — HOME CARE VISIT (OUTPATIENT)
Dept: HOME HEALTH SERVICES | Facility: HOME HEALTHCARE | Age: 69
End: 2024-06-19
Payer: COMMERCIAL

## 2024-06-19 VITALS — DIASTOLIC BLOOD PRESSURE: 80 MMHG | OXYGEN SATURATION: 94 % | HEART RATE: 80 BPM | SYSTOLIC BLOOD PRESSURE: 138 MMHG

## 2024-06-19 PROCEDURE — G0151 HHCP-SERV OF PT,EA 15 MIN: HCPCS

## 2024-06-20 ENCOUNTER — HOME CARE VISIT (OUTPATIENT)
Dept: HOME HEALTH SERVICES | Facility: HOME HEALTHCARE | Age: 69
End: 2024-06-20
Payer: COMMERCIAL

## 2024-06-20 VITALS — OXYGEN SATURATION: 95 % | SYSTOLIC BLOOD PRESSURE: 130 MMHG | DIASTOLIC BLOOD PRESSURE: 80 MMHG | HEART RATE: 72 BPM

## 2024-06-20 DIAGNOSIS — C34.91 CARCINOMA OF RIGHT LUNG (HCC): ICD-10-CM

## 2024-06-20 DIAGNOSIS — D70.1 CHEMOTHERAPY-INDUCED NEUTROPENIA (HCC): ICD-10-CM

## 2024-06-20 DIAGNOSIS — T45.1X5A CHEMOTHERAPY-INDUCED NEUTROPENIA (HCC): ICD-10-CM

## 2024-06-20 DIAGNOSIS — C79.9 METASTATIC ADENOCARCINOMA (HCC): Primary | ICD-10-CM

## 2024-06-20 PROCEDURE — G0152 HHCP-SERV OF OT,EA 15 MIN: HCPCS

## 2024-06-20 RX ORDER — SODIUM CHLORIDE 9 MG/ML
20 INJECTION, SOLUTION INTRAVENOUS ONCE
Status: CANCELLED | OUTPATIENT
Start: 2024-06-27

## 2024-06-21 ENCOUNTER — HOME CARE VISIT (OUTPATIENT)
Dept: HOME HEALTH SERVICES | Facility: HOME HEALTHCARE | Age: 69
End: 2024-06-21
Payer: COMMERCIAL

## 2024-06-21 VITALS
RESPIRATION RATE: 22 BRPM | TEMPERATURE: 97.4 F | OXYGEN SATURATION: 96 % | HEART RATE: 82 BPM | DIASTOLIC BLOOD PRESSURE: 68 MMHG | SYSTOLIC BLOOD PRESSURE: 118 MMHG

## 2024-06-21 PROCEDURE — G0299 HHS/HOSPICE OF RN EA 15 MIN: HCPCS

## 2024-06-24 ENCOUNTER — OFFICE VISIT (OUTPATIENT)
Dept: URGENT CARE | Facility: CLINIC | Age: 69
End: 2024-06-24
Payer: COMMERCIAL

## 2024-06-24 ENCOUNTER — APPOINTMENT (OUTPATIENT)
Dept: RADIOLOGY | Facility: CLINIC | Age: 69
End: 2024-06-24
Payer: COMMERCIAL

## 2024-06-24 VITALS
DIASTOLIC BLOOD PRESSURE: 84 MMHG | RESPIRATION RATE: 18 BRPM | OXYGEN SATURATION: 97 % | TEMPERATURE: 97.6 F | SYSTOLIC BLOOD PRESSURE: 134 MMHG | HEART RATE: 82 BPM
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
## 2024-06-24 DIAGNOSIS — R10.32 LEFT GROIN PAIN: ICD-10-CM

## 2024-06-24 DIAGNOSIS — S70.02XA CONTUSION OF LEFT HIP, INITIAL ENCOUNTER: ICD-10-CM

## 2024-06-24 DIAGNOSIS — S76.212A STRAIN OF GROIN, LEFT, INITIAL ENCOUNTER: Primary | ICD-10-CM

## 2024-06-24 PROCEDURE — 73503 X-RAY EXAM HIP UNI 4/> VIEWS: CPT

## 2024-06-24 PROCEDURE — G0382 LEV 3 HOSP TYPE B ED VISIT: HCPCS | Performed by: NURSE PRACTITIONER

## 2024-06-24 PROCEDURE — S9083 URGENT CARE CENTER GLOBAL: HCPCS | Performed by: NURSE PRACTITIONER

## 2024-06-24 NOTE — PATIENT INSTRUCTIONS
Your xray was read as negative for fracture  If pain continues you are to be seen in the ED as you may need additional imaging.  You are to take tylenol for pain.  You appear to have a groin strain.  Follow up with your PCP in 3-5 days  Go to the ED if symptoms worsen

## 2024-06-24 NOTE — PROGRESS NOTES
"  [facility]        NAME: Paras Pitts is a 69 y.o. male  : 1955    MRN: [id number]  DATE: 2024  TIME: 10:30 AM    Assessment and Plan   Strain of groin, left, initial encounter [S76.212A]  1. Strain of groin, left, initial encounter        2. Contusion of left hip, initial encounter        3. Left groin pain  XR hip/pelv 4+ vw left if performed      4. Fall, initial encounter  XR hip/pelv 4+ vw left if performed            Patient Instructions       Follow up with PCP in 3-5 days.  Proceed to  ER if symptoms worsen.    If tests have been performed at [facility], our office will contact you with results if changes need to be made to the care plan discussed with you at the visit.  [facility]    Your xray was read as negative for fracture  If pain continues you are to be seen in the ED as you may need additional imaging.  You are to take tylenol for pain.  You appear to have a groin strain.  Follow up with your PCP in 3-5 days  Go to the ED if symptoms worsen         Chief Complaint     Chief Complaint   Patient presents with    Fall     C/o mechanical fall onset \"Saturday\", pt reports \"Saturday night I turned off the lights and was walking with my walker when I tripped and fell to the left side\". Pt able to get self up from fall, denies seeking care at time of event. Denies head strike/LOC. Pt reports primary source of pain is left groin, denies pain radiation. Denies numbness/tingling. Pt reports \"I can't put pressure on that side\". Last dose \"ibuprofen this morning\". Denies ASA/anticoags. Released from home PT on Friday.         History of Present Illness       This is a 69 year old male who has lung cancer, prostate cancer with mets.  He states Saturday midnight he had turned out the bedroom light and was walking with his walker and fell onto his left hip. He has a bruise at the trochanter area and c/o left groin pain. He states he has been unable " to bear weight and walk.  He did not seek care at that time.  PMH is listed.         Review of Systems   Review of Systems   Constitutional: Negative.    HENT: Negative.     Eyes: Negative.    Respiratory: Negative.     Cardiovascular: Negative.    Gastrointestinal: Negative.    Endocrine: Negative.    Genitourinary: Negative.    Musculoskeletal:         Left groin pain    Skin:  Positive for color change.   Allergic/Immunologic: Negative.    Neurological: Negative.    Hematological: Negative.    Psychiatric/Behavioral: Negative.           Current Medications       Current Outpatient Medications:     acetaminophen (TYLENOL) 325 mg tablet, Take 2 tablets (650 mg total) by mouth every 6 (six) hours as needed for mild pain or fever, Disp: , Rfl:     amLODIPine (NORVASC) 10 mg tablet, Take 1 tablet (10 mg total) by mouth daily, Disp: 30 tablet, Rfl: 0    atorvastatin (LIPITOR) 40 mg tablet, Take 1 tablet (40 mg total) by mouth daily after dinner, Disp: 30 tablet, Rfl: 0    cloBAZam (ONFI) 10 MG tablet, Take 0.5 tablets (5 mg total) by mouth 2 (two) times a day for 10 days, Disp: 10 tablet, Rfl: 0    dexamethasone (DECADRON) 4 mg tablet, Take 1 tablet (4 mg total) by mouth daily, Disp: 30 tablet, Rfl: 0    dexamethasone (DECADRON) 4 mg tablet, Take 2 tablets (8 mg total) by mouth 2 (two) times a day with meals TAKE 2 TABS (8MG) TWICE A DAY FOR 3 DAYS THE DAY BEFORE CHEMO DAY OF CHEMO AND DAY AFTER CHEMO EVERY 3 WEEKS, Disp: 12 tablet, Rfl: 6    diphenhydramine, lidocaine, Al/Mg hydroxide, simethicone (Magic Mouthwash) SUSP, Swish and spit 10 mL every 4 (four) hours as needed for mouth pain or discomfort, Disp: 480 mL, Rfl: 3    ipratropium-albuterol (DUO-NEB) 0.5-2.5 mg/3 mL nebulizer solution, Take 3 mL by nebulization 3 (three) times a day, Disp: 270 mL, Rfl: 0    lacosamide (VIMPAT) 200 mg tablet, Take 1 tablet (200 mg total) by mouth every 12 (twelve) hours for 10 days, Disp: 20 tablet, Rfl: 0    levETIRAcetam  (KEPPRA) 1000 MG tablet, Take 2 tablets (2,000 mg total) by mouth every 12 (twelve) hours, Disp: 120 tablet, Rfl: 0    loperamide (IMODIUM A-D) 2 MG tablet, Take 2 mg by mouth 4 (four) times a day as needed for diarrhea. Indications: Diarrhea caused by Chemotherapy, Disp: , Rfl:     losartan (COZAAR) 50 mg tablet, Take 1 tablet (50 mg total) by mouth daily, Disp: 30 tablet, Rfl: 0    pantoprazole (PROTONIX) 40 mg tablet, Take 1 tablet (40 mg total) by mouth daily, Disp: 30 tablet, Rfl: 0    pramipexole (MIRAPEX) 0.25 mg tablet, Take 1 tablet (0.25 mg total) by mouth 3 (three) times a day, Disp: 90 tablet, Rfl: 0    Current Allergies     Allergies as of 06/24/2024    (No Known Allergies)            The following portions of the patient's history were reviewed and updated as appropriate: allergies, current medications, past family history, past medical history, past social history, past surgical history and problem list.     Past Medical History:   Diagnosis Date    Brain compression (HCC) 03/20/2023    Brain mass 03/20/2023    Cancer (HCC) 2001    Receint lung and brain lesions and prostate cancer in 2001    Cerebral edema (HCC) 03/20/2023    Hypertension     Prostate cancer (HCC) 2001    Rectal bleeding     Stroke (HCC)     2011         Past Surgical History:   Procedure Laterality Date    COLONOSCOPY      CRANIOTOMY Right 3/23/2023    Procedure: Right frontal CRANIOTOMY IMAGE-GUIDED FOR TUMOR;  Surgeon: Clark Carrillo MD;  [facility];  Service: Neurosurgery    ENDOBRONCHIAL ULTRASOUND (EBUS) N/A 4/16/2024    Procedure: ENDOBRONCHIAL ULTRASOUND (EBUS);  Surgeon: Kalia Sparrow MD;  [facility];  Service: Thoracic    IR PORT PLACEMENT  4/27/2023    [facility] INCL FLUOR GDNCE DX W/CELL WASHG SPX N/A 9/1/2023    Procedure: BRONCHOSCOPY FLEXIBLE;  Surgeon: Kalia Sparrow MD;  [facility];  Service: Thoracic    [facility] INCL FLUOR GDNCE DX W/CELL WASHG SPX N/A 4/16/2024     Procedure: BRONCHOSCOPY FLEXIBLE;  Surgeon: Kalia Sparrow MD;  [facility];  Service: Thoracic    VA CYSTOURETHROSCOPY N/A 3/23/2023    Procedure: EUA, DEL CASTILLO INSERTION;  Surgeon: Ajay Piedra MD;  [facility];  Service: Urology    VA THORACOSCOPY W/LOBECTOMY SINGLE LOBE Right 2023    Procedure: robotic assisted converted to open right upper lobectomy, lymph node dissection;  Surgeon: Kalia Sparrow MD;  [facility];  Service: Thoracic    PROSTATECTOMY  2001    THORACOTOMY Right 2023    Procedure: right thoracotomy with Cryo-Ablation;  Surgeon: Kalia Sparrow MD;  [facility];  Service: Thoracic    TONSILLECTOMY         Family History   Problem Relation Age of Onset    Dementia Mother             Breast cancer Sister             Prostate cancer Brother         Received Radiation         Medications have been verified.        Objective   /84 (BP Location: Right arm, Patient Position: Sitting, Cuff Size: Large)   Pulse 82   Temp 97.6 °F (36.4 °C) (Temporal)   Resp 18   SpO2 97%   No LMP for male patient.       Physical Exam     Physical Exam  Vitals and nursing note reviewed.   Constitutional:       General: He is not in acute distress.     Appearance: Normal appearance. He is obese. He is not ill-appearing, toxic-appearing or diaphoretic.      Comments: Frail, older than stated age appearing  Requires assistance going from WC to exam bed    HENT:      Head: Normocephalic and atraumatic.   Eyes:      Extraocular Movements: Extraocular movements intact.   Cardiovascular:      Rate and Rhythm: Normal rate and regular rhythm.      Pulses: Normal pulses.      Heart sounds: Normal heart sounds. No murmur heard.  Pulmonary:      Effort: Pulmonary effort is normal. No respiratory distress.      Breath sounds: Normal breath sounds. No stridor. No wheezing, rhonchi or rales.   Chest:      Chest wall: No tenderness.   Abdominal:      General:  There is distension.      Tenderness: There is no abdominal tenderness.   Musculoskeletal:         General: Tenderness and signs of injury present.      Cervical back: Normal range of motion.      Right lower leg: Edema present.      Left lower leg: Edema present.      Comments: LROM left LE  due to pain in groin  No internal rotation or shortening of left leg.  NVI  In WC, requires assistance x 2/3 to move from WC to exam bed    Skin:     General: Skin is warm and dry.      Capillary Refill: Capillary refill takes less than 2 seconds.          Neurological:      General: No focal deficit present.      Mental Status: He is alert and oriented to person, place, and time.   Psychiatric:         Mood and Affect: Mood normal.         Behavior: Behavior normal.         Thought Content: Thought content normal.         Judgment: Judgment normal.       Preliminary reading pelvis/hip xray  No obvious fracture/deformity  Called [facility] radiology for official read  Waiting on final read       XR HIP/PELV 4+ VW LEFT W PELVIS IF PERFORMED   INDICATION: R10.32: Left lower quadrant pain  W19.XXXA: Unspecified fall, initial encounter.   COMPARISON: None   FINDINGS:   No acute fracture or dislocation.   Bilateral mild degenerative hip joints   No lytic or blastic osseous lesion.   Unremarkable soft tissues.   Degenerative lower lumbar spine and pubic symphysis.   IMPRESSION:   No acute osseous abnormality.

## 2024-06-26 ENCOUNTER — HOSPITAL ENCOUNTER (OUTPATIENT)
Dept: INFUSION CENTER | Facility: HOSPITAL | Age: 69
Discharge: HOME | End: 2024-06-26
Payer: COMMERCIAL

## 2024-06-26 VITALS — TEMPERATURE: 97.8 F

## 2024-06-26 DIAGNOSIS — C79.9 METASTATIC ADENOCARCINOMA (HCC): Primary | ICD-10-CM

## 2024-06-26 DIAGNOSIS — T45.1X5A CHEMOTHERAPY-INDUCED NEUTROPENIA (HCC): ICD-10-CM

## 2024-06-26 DIAGNOSIS — D70.1 CHEMOTHERAPY-INDUCED NEUTROPENIA (HCC): ICD-10-CM

## 2024-06-26 DIAGNOSIS — C79.9 METASTATIC ADENOCARCINOMA (HCC): ICD-10-CM

## 2024-06-26 DIAGNOSIS — C34.91 CARCINOMA OF RIGHT LUNG (HCC): ICD-10-CM

## 2024-06-26 LAB
ALBUMIN SERPL BCG-MCNC: 3.9 G/DL (ref 3.5–5)
ALP SERPL-CCNC: 60 U/L (ref 34–104)
ALT SERPL W P-5'-P-CCNC: 17 U/L (ref 7–52)
ANION GAP SERPL CALCULATED.3IONS-SCNC: 9 MMOL/L (ref 4–13)
ANISOCYTOSIS BLD QL SMEAR: PRESENT
AST SERPL W P-5'-P-CCNC: 15 U/L (ref 13–39)
BACTERIA UR QL AUTO: ABNORMAL /HPF
BASOPHILS # BLD MANUAL: 0.16 THOUSAND/UL (ref 0–0.1)
BASOPHILS NFR MAR MANUAL: 1 % (ref 0–1)
BILIRUB SERPL-MCNC: 0.77 MG/DL (ref 0.2–1)
BILIRUB UR QL STRIP: NEGATIVE
BUN SERPL-MCNC: 12 MG/DL (ref 5–25)
CALCIUM SERPL-MCNC: 8.5 MG/DL (ref 8.4–10.2)
CHLORIDE SERPL-SCNC: 103 MMOL/L (ref 96–108)
CLARITY UR: CLEAR
CO2 SERPL-SCNC: 28 MMOL/L (ref 21–32)
COLOR UR: YELLOW
CREAT SERPL-MCNC: 0.72 MG/DL (ref 0.6–1.3)
DACRYOCYTES BLD QL SMEAR: PRESENT
EOSINOPHIL # BLD MANUAL: 0 THOUSAND/UL (ref 0–0.4)
EOSINOPHIL NFR BLD MANUAL: 0 % (ref 0–6)
ERYTHROCYTE [DISTWIDTH] IN BLOOD BY AUTOMATED COUNT: 16 % (ref 11.6–15.1)
GFR SERPL CREATININE-BSD FRML MDRD: 93 ML/MIN/1.73SQ M
GLUCOSE SERPL-MCNC: 247 MG/DL (ref 65–140)
GLUCOSE UR STRIP-MCNC: NEGATIVE MG/DL
HCT VFR BLD AUTO: 37.2 % (ref 36.5–49.3)
HGB BLD-MCNC: 11.6 G/DL (ref 12–17)
HGB UR QL STRIP.AUTO: NEGATIVE
KETONES UR STRIP-MCNC: NEGATIVE MG/DL
LEUKOCYTE ESTERASE UR QL STRIP: NEGATIVE
LYMPHOCYTES # BLD AUTO: 0.99 THOUSAND/UL (ref 0.6–4.47)
LYMPHOCYTES # BLD AUTO: 6 % (ref 14–44)
MCH RBC QN AUTO: 32.1 PG (ref 26.8–34.3)
MCHC RBC AUTO-ENTMCNC: 31.2 G/DL (ref 31.4–37.4)
MCV RBC AUTO: 103 FL (ref 82–98)
METAMYELOCYTE ABSOLUTE CT: 0.33 THOUSAND/UL (ref 0–0.1)
METAMYELOCYTES NFR BLD MANUAL: 2 % (ref 0–1)
MONOCYTES # BLD AUTO: 0.49 THOUSAND/UL (ref 0–1.22)
MONOCYTES NFR BLD: 3 % (ref 4–12)
MYELOCYTE ABSOLUTE CT: 0.33 THOUSAND/UL (ref 0–0.1)
MYELOCYTES NFR BLD MANUAL: 2 % (ref 0–1)
NEUTROPHILS # BLD MANUAL: 14.12 THOUSAND/UL (ref 1.85–7.62)
NEUTS BAND NFR BLD MANUAL: 5 % (ref 0–8)
NEUTS SEG NFR BLD AUTO: 81 % (ref 43–75)
NITRITE UR QL STRIP: NEGATIVE
NON-SQ EPI CELLS URNS QL MICRO: ABNORMAL /HPF
NRBC BLD AUTO-RTO: 1 /100 WBC (ref 0–2)
OVALOCYTES BLD QL SMEAR: PRESENT
PH UR STRIP.AUTO: 8 [PH]
PLATELET # BLD AUTO: 164 THOUSANDS/UL (ref 149–390)
PLATELET BLD QL SMEAR: ADEQUATE
PMV BLD AUTO: 9.8 FL (ref 8.9–12.7)
POLYCHROMASIA BLD QL SMEAR: PRESENT
POTASSIUM SERPL-SCNC: 3.3 MMOL/L (ref 3.5–5.3)
PROT SERPL-MCNC: 6.2 G/DL (ref 6.4–8.4)
PROT UR STRIP-MCNC: NEGATIVE MG/DL
RBC # BLD AUTO: 3.61 MILLION/UL (ref 3.88–5.62)
RBC #/AREA URNS AUTO: ABNORMAL /HPF
RBC MORPH BLD: PRESENT
SODIUM SERPL-SCNC: 140 MMOL/L (ref 135–147)
SP GR UR STRIP.AUTO: 1.02
UROBILINOGEN UR QL STRIP.AUTO: 0.2 E.U./DL
WBC # BLD AUTO: 16.42 THOUSAND/UL (ref 4.31–10.16)
WBC #/AREA URNS AUTO: ABNORMAL /HPF

## 2024-06-26 PROCEDURE — 80053 COMPREHEN METABOLIC PANEL: CPT | Performed by: INTERNAL MEDICINE

## 2024-06-26 PROCEDURE — 81001 URINALYSIS AUTO W/SCOPE: CPT | Performed by: INTERNAL MEDICINE

## 2024-06-26 PROCEDURE — 85027 COMPLETE CBC AUTOMATED: CPT | Performed by: INTERNAL MEDICINE

## 2024-06-26 PROCEDURE — 85007 BL SMEAR W/DIFF WBC COUNT: CPT | Performed by: INTERNAL MEDICINE

## 2024-06-26 NOTE — PROGRESS NOTES
Paras Pitts  tolerated lab draw well with no complications.      Paras Pitts is aware of future appt on 6/27 at 12:30PM.     AVS printed and given to Paras Pitts: No (Declined by Paras Pitts).

## 2024-06-27 ENCOUNTER — HOSPITAL ENCOUNTER (OUTPATIENT)
Dept: INFUSION CENTER | Facility: HOSPITAL | Age: 69
End: 2024-06-27
Attending: INTERNAL MEDICINE
Payer: COMMERCIAL

## 2024-06-27 VITALS
RESPIRATION RATE: 19 BRPM | TEMPERATURE: 98.6 F | HEART RATE: 89 BPM | DIASTOLIC BLOOD PRESSURE: 99 MMHG | WEIGHT: 203.48 LBS | BODY MASS INDEX: 30.14 KG/M2 | SYSTOLIC BLOOD PRESSURE: 170 MMHG | OXYGEN SATURATION: 98 % | HEIGHT: 69 IN

## 2024-06-27 DIAGNOSIS — C79.9 METASTATIC ADENOCARCINOMA (HCC): Primary | ICD-10-CM

## 2024-06-27 DIAGNOSIS — T45.1X5A CHEMOTHERAPY-INDUCED NEUTROPENIA (HCC): ICD-10-CM

## 2024-06-27 DIAGNOSIS — D70.1 CHEMOTHERAPY-INDUCED NEUTROPENIA (HCC): ICD-10-CM

## 2024-06-27 DIAGNOSIS — C34.91 CARCINOMA OF RIGHT LUNG (HCC): ICD-10-CM

## 2024-06-27 PROCEDURE — 96367 TX/PROPH/DG ADDL SEQ IV INF: CPT

## 2024-06-27 PROCEDURE — 96413 CHEMO IV INFUSION 1 HR: CPT

## 2024-06-27 PROCEDURE — 96417 CHEMO IV INFUS EACH ADDL SEQ: CPT

## 2024-06-27 PROCEDURE — 96377 APPLICATON ON-BODY INJECTOR: CPT

## 2024-06-27 RX ORDER — SODIUM CHLORIDE 9 MG/ML
20 INJECTION, SOLUTION INTRAVENOUS ONCE
Status: COMPLETED | OUTPATIENT
Start: 2024-06-27 | End: 2024-06-27

## 2024-06-27 RX ADMIN — DEXAMETHASONE SODIUM PHOSPHATE: 100 INJECTION INTRAMUSCULAR; INTRAVENOUS at 12:53

## 2024-06-27 RX ADMIN — BEVACIZUMAB 457.5 MG: 400 INJECTION, SOLUTION INTRAVENOUS at 14:04

## 2024-06-27 RX ADMIN — DOCETAXEL 155.2 MG: 20 INJECTION, SOLUTION, CONCENTRATE INTRAVENOUS at 14:45

## 2024-06-27 RX ADMIN — SODIUM CHLORIDE 20 ML/HR: 0.9 INJECTION, SOLUTION INTRAVENOUS at 12:50

## 2024-06-27 RX ADMIN — PEGFILGRASTIM 6 MG: KIT SUBCUTANEOUS at 15:48

## 2024-06-27 NOTE — PROGRESS NOTES
Paras Pitts  tolerated avastin and taxotere treatment well with no complications.  Neulasta on pro applied to rt arm. Pt and wife are familiar with device.     Paras Pitts is aware of future appt on July 17th for labs     AVS printed and given to Paras Pitts:  Yes

## 2024-06-28 ENCOUNTER — HOSPITAL ENCOUNTER (OUTPATIENT)
Dept: INFUSION CENTER | Facility: HOSPITAL | Age: 69
Discharge: HOME | End: 2024-06-28
Payer: COMMERCIAL

## 2024-06-28 DIAGNOSIS — T45.1X5A CHEMOTHERAPY-INDUCED NEUTROPENIA (HCC): ICD-10-CM

## 2024-06-28 DIAGNOSIS — C34.91 CARCINOMA OF RIGHT LUNG (HCC): Primary | ICD-10-CM

## 2024-06-28 DIAGNOSIS — C79.9 METASTATIC ADENOCARCINOMA (HCC): Primary | ICD-10-CM

## 2024-06-28 DIAGNOSIS — D70.1 CHEMOTHERAPY-INDUCED NEUTROPENIA (HCC): ICD-10-CM

## 2024-06-28 DIAGNOSIS — C34.91 CARCINOMA OF RIGHT LUNG (HCC): ICD-10-CM

## 2024-06-28 DIAGNOSIS — C79.9 METASTATIC ADENOCARCINOMA (HCC): ICD-10-CM

## 2024-06-28 PROCEDURE — 96372 THER/PROPH/DIAG INJ SC/IM: CPT

## 2024-06-28 RX ADMIN — PEGFILGRASTIM 6 MG: 6 INJECTION SUBCUTANEOUS at 16:12

## 2024-06-28 NOTE — PROGRESS NOTES
Pt's wife phoned infusion center to report neulasta onpro was knocked of pt's arm.  Elaine Kay RN made aware & day 2 added to tx for today to give neulasta injection.

## 2024-06-28 NOTE — PROGRESS NOTES
Parsa Pitts  tolerated treatment well with no complications.  Nigel TURCIOS.    Paras Pitts is aware of future appt on 7/17 at 11 am.     AVS declined by Paras Pitts.

## 2024-07-02 ENCOUNTER — HOSPITAL ENCOUNTER (EMERGENCY)
Facility: HOSPITAL | Age: 69
Discharge: HOME | End: 2024-07-02
Attending: FAMILY MEDICINE | Admitting: FAMILY MEDICINE
Payer: COMMERCIAL

## 2024-07-02 ENCOUNTER — APPOINTMENT (EMERGENCY)
Dept: CT IMAGING | Facility: HOSPITAL | Age: 69
End: 2024-07-02
Payer: COMMERCIAL

## 2024-07-02 VITALS
TEMPERATURE: 97 F | DIASTOLIC BLOOD PRESSURE: 77 MMHG | SYSTOLIC BLOOD PRESSURE: 129 MMHG | BODY MASS INDEX: 30.36 KG/M2 | WEIGHT: 205 LBS | OXYGEN SATURATION: 96 % | RESPIRATION RATE: 18 BRPM | HEART RATE: 92 BPM | HEIGHT: 69 IN

## 2024-07-02 DIAGNOSIS — E83.42 HYPOMAGNESEMIA: ICD-10-CM

## 2024-07-02 DIAGNOSIS — R25.1 SHAKING: Primary | ICD-10-CM

## 2024-07-02 DIAGNOSIS — R56.9 SEIZURE (HCC): ICD-10-CM

## 2024-07-02 LAB
ANION GAP SERPL CALCULATED.3IONS-SCNC: 9 MMOL/L (ref 4–13)
BASOPHILS # BLD MANUAL: 0 THOUSAND/UL (ref 0–0.1)
BASOPHILS NFR MAR MANUAL: 0 % (ref 0–1)
BUN SERPL-MCNC: 12 MG/DL (ref 5–25)
CALCIUM SERPL-MCNC: 8.9 MG/DL (ref 8.4–10.2)
CHLORIDE SERPL-SCNC: 103 MMOL/L (ref 96–108)
CO2 SERPL-SCNC: 29 MMOL/L (ref 21–32)
CREAT SERPL-MCNC: 0.77 MG/DL (ref 0.6–1.3)
DACRYOCYTES BLD QL SMEAR: PRESENT
EOSINOPHIL # BLD MANUAL: 0.04 THOUSAND/UL (ref 0–0.4)
EOSINOPHIL NFR BLD MANUAL: 1 % (ref 0–6)
ERYTHROCYTE [DISTWIDTH] IN BLOOD BY AUTOMATED COUNT: 14.6 % (ref 11.6–15.1)
FLUAV RNA RESP QL NAA+PROBE: NEGATIVE
FLUBV RNA RESP QL NAA+PROBE: NEGATIVE
GFR SERPL CREATININE-BSD FRML MDRD: 91 ML/MIN/1.73SQ M
GLUCOSE SERPL-MCNC: 175 MG/DL (ref 65–140)
HCT VFR BLD AUTO: 39.6 % (ref 36.5–49.3)
HGB BLD-MCNC: 12.8 G/DL (ref 12–17)
LEVETIRACETAM SERPL-MCNC: <2 UG/ML (ref 12–46)
LYMPHOCYTES # BLD AUTO: 1.2 THOUSAND/UL (ref 0.6–4.47)
LYMPHOCYTES # BLD AUTO: 31 % (ref 14–44)
MAGNESIUM SERPL-MCNC: 1.7 MG/DL (ref 1.9–2.7)
MCH RBC QN AUTO: 32.2 PG (ref 26.8–34.3)
MCHC RBC AUTO-ENTMCNC: 32.3 G/DL (ref 31.4–37.4)
MCV RBC AUTO: 100 FL (ref 82–98)
METAMYELOCYTE ABSOLUTE CT: 0.15 THOUSAND/UL (ref 0–0.1)
METAMYELOCYTES NFR BLD MANUAL: 4 % (ref 0–1)
MONOCYTES # BLD AUTO: 0.19 THOUSAND/UL (ref 0–1.22)
MONOCYTES NFR BLD: 5 % (ref 4–12)
MYELOCYTE ABSOLUTE CT: 0.08 THOUSAND/UL (ref 0–0.1)
MYELOCYTES NFR BLD MANUAL: 2 % (ref 0–1)
NEUTROPHILS # BLD MANUAL: 2.21 THOUSAND/UL (ref 1.85–7.62)
NEUTS BAND NFR BLD MANUAL: 8 % (ref 0–8)
NEUTS SEG NFR BLD AUTO: 49 % (ref 43–75)
PLATELET # BLD AUTO: 88 THOUSANDS/UL (ref 149–390)
PLATELET BLD QL SMEAR: ABNORMAL
PMV BLD AUTO: 10.2 FL (ref 8.9–12.7)
POTASSIUM SERPL-SCNC: 3.8 MMOL/L (ref 3.5–5.3)
RBC # BLD AUTO: 3.98 MILLION/UL (ref 3.88–5.62)
RBC MORPH BLD: PRESENT
RSV RNA RESP QL NAA+PROBE: NEGATIVE
SARS-COV-2 RNA RESP QL NAA+PROBE: NEGATIVE
SODIUM SERPL-SCNC: 141 MMOL/L (ref 135–147)
TOXIC GRANULES BLD QL SMEAR: PRESENT
WBC # BLD AUTO: 3.87 THOUSAND/UL (ref 4.31–10.16)

## 2024-07-02 PROCEDURE — 85007 BL SMEAR W/DIFF WBC COUNT: CPT | Performed by: FAMILY MEDICINE

## 2024-07-02 PROCEDURE — 83735 ASSAY OF MAGNESIUM: CPT | Performed by: FAMILY MEDICINE

## 2024-07-02 PROCEDURE — 99285 EMERGENCY DEPT VISIT HI MDM: CPT | Performed by: FAMILY MEDICINE

## 2024-07-02 PROCEDURE — 80048 BASIC METABOLIC PNL TOTAL CA: CPT | Performed by: FAMILY MEDICINE

## 2024-07-02 PROCEDURE — 85027 COMPLETE CBC AUTOMATED: CPT | Performed by: FAMILY MEDICINE

## 2024-07-02 PROCEDURE — 36415 COLL VENOUS BLD VENIPUNCTURE: CPT | Performed by: FAMILY MEDICINE

## 2024-07-02 PROCEDURE — 70450 CT HEAD/BRAIN W/O DYE: CPT

## 2024-07-02 PROCEDURE — 83520 IMMUNOASSAY QUANT NOS NONAB: CPT | Performed by: FAMILY MEDICINE

## 2024-07-02 PROCEDURE — 0241U HB NFCT DS VIR RESP RNA 4 TRGT: CPT | Performed by: FAMILY MEDICINE

## 2024-07-02 RX ORDER — LEVETIRACETAM 500 MG/5ML
1500 INJECTION, SOLUTION, CONCENTRATE INTRAVENOUS ONCE
Status: COMPLETED | OUTPATIENT
Start: 2024-07-02 | End: 2024-07-02

## 2024-07-02 RX ORDER — PRAMIPEXOLE DIHYDROCHLORIDE 0.25 MG/1
0.25 TABLET ORAL 3 TIMES DAILY
Qty: 90 TABLET | Refills: 0 | Status: SHIPPED | OUTPATIENT
Start: 2024-07-02 | End: 2024-08-01

## 2024-07-02 RX ORDER — DEXAMETHASONE SODIUM PHOSPHATE 10 MG/ML
10 INJECTION, SOLUTION INTRAMUSCULAR; INTRAVENOUS ONCE
Status: COMPLETED | OUTPATIENT
Start: 2024-07-02 | End: 2024-07-02

## 2024-07-02 RX ORDER — LEVETIRACETAM 1000 MG/1
1000 TABLET ORAL 2 TIMES DAILY
Qty: 60 TABLET | Refills: 1 | Status: SHIPPED | OUTPATIENT
Start: 2024-07-02 | End: 2024-08-31

## 2024-07-02 RX ORDER — MAGNESIUM SULFATE 1 G/100ML
1 INJECTION INTRAVENOUS ONCE
Status: COMPLETED | OUTPATIENT
Start: 2024-07-02 | End: 2024-07-02

## 2024-07-02 RX ADMIN — MAGNESIUM SULFATE HEPTAHYDRATE 1 G: 1 INJECTION, SOLUTION INTRAVENOUS at 16:25

## 2024-07-02 RX ADMIN — DEXAMETHASONE SODIUM PHOSPHATE 10 MG: 10 INJECTION, SOLUTION INTRAMUSCULAR; INTRAVENOUS at 17:07

## 2024-07-02 RX ADMIN — LEVETIRACETAM 1500 MG: 100 INJECTION INTRAVENOUS at 15:50

## 2024-07-02 NOTE — ED PROVIDER NOTES
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  Patient states he had a similar episode about a month ago was started on Keppra.  Patient states that he ran out of his medication has been taking over few weeks.  States started with the shaking of his left hand then throughout his body.  Denies loss of consciousness.  Patient states he was awake and aware during this process.  Patient is now awake alert and 26 obtained.  Denies any chest pain shortness of breath any headache blurry vision double vision.  Prior to Admission Medications   Prescriptions Last Dose Informant Patient Reported? Taking?   acetaminophen (TYLENOL) 325 mg tablet   No No   Sig: Take 2 tablets (650 mg total) by mouth every 6 (six) hours as needed for mild pain or fever   amLODIPine (NORVASC) 10 mg tablet   No No   Sig: Take 1 tablet (10 mg total) by mouth daily   atorvastatin (LIPITOR) 40 mg tablet   No No   Sig: Take 1 tablet (40 mg total) by mouth daily after dinner   cloBAZam (ONFI) 10 MG tablet   No No   Sig: Take 0.5 tablets (5 mg total) by mouth 2 (two) times a day for 10 days   dexamethasone (DECADRON) 4 mg tablet   No No   Sig: Take 1 tablet (4 mg total) by mouth daily   dexamethasone (DECADRON) 4 mg tablet   No No   Sig: Take 2 tablets (8 mg total) by mouth 2 (two) times a day with meals TAKE 2 TABS (8MG) TWICE A DAY FOR 3 DAYS THE DAY BEFORE CHEMO DAY OF CHEMO AND DAY AFTER CHEMO EVERY 3 WEEKS   diphenhydramine, lidocaine, Al/Mg hydroxide, simethicone (Magic Mouthwash) SUSP   No No   Sig: Swish and spit 10 mL every 4 (four) hours as needed for mouth pain or discomfort   ipratropium-albuterol (DUO-NEB) 0.5-2.5 mg/3 mL nebulizer solution   No No   Sig: Take 3 mL by nebulization 3 (three) times a day   lacosamide (VIMPAT) 200 mg tablet   No No   Sig: Take 1 tablet (200 mg total) by mouth every 12 (twelve)  hours for 10 days   levETIRAcetam (KEPPRA) 1000 MG tablet   No No   Sig: Take 2 tablets (2,000 mg total) by mouth every 12 (twelve) hours   loperamide (IMODIUM A-D) 2 MG tablet   Yes No   Sig: Take 2 mg by mouth 4 (four) times a day as needed for diarrhea. Indications: Diarrhea caused by Chemotherapy   losartan (COZAAR) 50 mg tablet   No No   Sig: Take 1 tablet (50 mg total) by mouth daily   pantoprazole (PROTONIX) 40 mg tablet   No No   Sig: Take 1 tablet (40 mg total) by mouth daily   pramipexole (MIRAPEX) 0.25 mg tablet   No No   Sig: Take 1 tablet (0.25 mg total) by mouth 3 (three) times a day      Facility-Administered Medications: None       Past Medical History:   Diagnosis Date    Brain compression (HCC) 03/20/2023    Brain mass 03/20/2023    Cancer (HCC) 2001    Receint lung and brain lesions and prostate cancer in 2001    Cerebral edema (HCC) 03/20/2023    Hypertension     Prostate cancer (HCC) 2001    Rectal bleeding     Stroke (HCC)     2011         Past Surgical History:   Procedure Laterality Date    COLONOSCOPY      CRANIOTOMY Right 3/23/2023    Procedure: Right frontal CRANIOTOMY IMAGE-GUIDED FOR TUMOR;  Surgeon: Clark Carrillo MD;  [facility];  Service: Neurosurgery    ENDOBRONCHIAL ULTRASOUND (EBUS) N/A 4/16/2024    Procedure: ENDOBRONCHIAL ULTRASOUND (EBUS);  Surgeon: Kalia Sparrow MD;  [facility];  Service: Thoracic    IR PORT PLACEMENT  4/27/2023    LA [facility] INCL FLUOR GDNCE DX W/CELL WASHG SPX N/A 9/1/2023    Procedure: BRONCHOSCOPY FLEXIBLE;  Surgeon: Kalia Sparrow MD;  [facility];  Service: Thoracic    [facility] W/CELL WASHG SPX N/A 4/16/2024    Procedure: BRONCHOSCOPY FLEXIBLE;  Surgeon: Kalia Sparrow MD;  [facility];  Service: Thoracic    LA CYSTOURETHROSCOPY N/A 3/23/2023    Procedure: EUA, DEL CASTILLO INSERTION;  Surgeon: Ajay Piedra MD;  [facility];  Service: Urology    LA THORACOSCOPY W/LOBECTOMY SINGLE  LOBE Right 2023    Procedure: robotic assisted converted to open right upper lobectomy, lymph node dissection;  Surgeon: Kalia Sparrow MD;  [facility];  Service: Thoracic    PROSTATECTOMY  2001    THORACOTOMY Right 2023    Procedure: right thoracotomy with Cryo-Ablation;  Surgeon: Kalia Sparrow MD;  [facility];  Service: Thoracic    TONSILLECTOMY         Family History   Problem Relation Age of Onset    Dementia Mother             Breast cancer Sister             Prostate cancer Brother         Received Radiation     I have reviewed and agree with the history as documented.    E-Cigarette/Vaping    E-Cigarette Use Never User      E-Cigarette/Vaping Substances    Nicotine No     THC Yes     CBD No     Flavoring No     Other No     Unknown No      Social History     Tobacco Use    Smoking status: Former     Current packs/day: 0.00     Average packs/day: 1 pack/day for 15.0 years (15.0 ttl pk-yrs)     Types: Cigarettes     Start date:      Quit date:      Years since quittin.5     Passive exposure: Never    Smokeless tobacco: Never    Tobacco comments:     i quit when i was 28. not sure of exact dates   Vaping Use    Vaping status: Never Used   Substance Use Topics    Alcohol use: Not Currently     Alcohol/week: 1.0 standard drink of alcohol     Types: 1 Shots of liquor per week     Comment: socially    Drug use: Yes     Types: Marijuana     Comment: gummies foro nausea with chemo       Review of Systems   Constitutional:  Negative for chills and fever.   HENT:  Negative for rhinorrhea and sore throat.    Eyes:  Negative for visual disturbance.   Respiratory:  Negative for cough and shortness of breath.    Cardiovascular:  Negative for chest pain and leg swelling.   Gastrointestinal:  Negative for abdominal pain, diarrhea, nausea and vomiting.   Genitourinary:  Negative for dysuria.   Musculoskeletal:  Negative for back pain and myalgias.   Skin:   Negative for rash.   Neurological:  Positive for seizures. Negative for dizziness and headaches.   Psychiatric/Behavioral:  Negative for confusion.    All other systems reviewed and are negative.      Physical Exam  Physical Exam  Vitals and nursing note reviewed.   Constitutional:       General: He is not in acute distress.     Appearance: He is well-developed. He is not diaphoretic.   HENT:      Head: Normocephalic and atraumatic.      Right Ear: External ear normal.      Left Ear: External ear normal.      Nose: Nose normal.   Eyes:      Conjunctiva/sclera: Conjunctivae normal.      Pupils: Pupils are equal, round, and reactive to light.   Cardiovascular:      Rate and Rhythm: Normal rate and regular rhythm.   Pulmonary:      Effort: Pulmonary effort is normal. No respiratory distress.      Breath sounds: Normal breath sounds. No wheezing.   Abdominal:      General: Bowel sounds are normal. There is no distension.      Palpations: Abdomen is soft.      Tenderness: There is no abdominal tenderness.   Musculoskeletal:      Cervical back: Normal range of motion and neck supple.   Lymphadenopathy:      Cervical: No cervical adenopathy.   Skin:     General: Skin is warm and dry.      Capillary Refill: Capillary refill takes less than 2 seconds.   Neurological:      Mental Status: He is alert and oriented to person, place, and time.   Psychiatric:         Behavior: Behavior normal.         Vital Signs  ED Triage Vitals   Temperature Pulse Respirations Blood Pressure SpO2   07/02/24 1542 07/02/24 1542 07/02/24 1542 07/02/24 1542 07/02/24 1542   (!) 97 °F (36.1 °C) (!) 109 20 (!) 175/93 95 %      Temp Source Heart Rate Source Patient Position - Orthostatic VS BP Location FiO2 (%)   07/02/24 1542 07/02/24 1542 07/02/24 1542 07/02/24 1542 --   Temporal Monitor Lying Left arm       Pain Score       07/02/24 1630       No Pain           Vitals:    07/02/24 1542 07/02/24 1549 07/02/24 1630   BP: (!) 175/93  116/73   Pulse:  (!) 109 103 96   Patient Position - Orthostatic VS: Lying  Lying         Visual Acuity      ED Medications  Medications   dexamethasone (PF) (DECADRON) injection 10 mg (has no administration in time range)   levETIRAcetam (KEPPRA) injection 1,500 mg (1,500 mg Intravenous Given 7/2/24 1550)   magnesium sulfate IVPB (premix) SOLN 1 g (1 g Intravenous New Bag 7/2/24 1625)       Diagnostic Studies  Results Reviewed       Procedure Component Value Units Date/Time    RBC Morphology Reflex Test [[id number]] Collected: 07/02/24 1548    Lab Status: Final result Specimen: Blood from Arm, Left Updated: 07/02/24 1701    CBC and differential [[id number]]  (Abnormal) Collected: 07/02/24 1548    Lab Status: Final result Specimen: Blood from Arm, Left Updated: 07/02/24 1651     WBC 3.87 Thousand/uL      RBC 3.98 Million/uL      Hemoglobin 12.8 g/dL      Hematocrit 39.6 %       fL      MCH 32.2 pg      MCHC 32.3 g/dL      RDW 14.6 %      MPV 10.2 fL      Platelets 88 Thousands/uL     Narrative:      This is an appended report.  These results have been appended to a previously verified report.    Manual Differential(PHLEBS Do Not Order) [[id number]]  (Abnormal) Collected: 07/02/24 1548    Lab Status: Final result Specimen: Blood from Arm, Left Updated: 07/02/24 1651     Segmented % 49 %      Bands % 8 %      Lymphocytes % 31 %      Monocytes % 5 %      Eosinophils % 1 %      Basophils % 0 %      Metamyelocytes % 4 %      Myelocytes % 2 %      Absolute Neutrophils 2.21 Thousand/uL      Absolute Lymphocytes 1.20 Thousand/uL      Absolute Monocytes 0.19 Thousand/uL      Absolute Eosinophils 0.04 Thousand/uL      Absolute Basophils 0.00 Thousand/uL      Absolute Metamyelocytes 0.15 Thousand/uL      Absolute Myelocytes 0.08 Thousand/uL      Total Counted --     Toxic Granulation Present     RBC Morphology Present     Platelet Estimate Decreased     Tear Drop Cells Present    FLU/RSV/COVID - if FLU/RSV clinically relevant [[id number]]   (Normal) Collected: 07/02/24 1548    Lab Status: Final result Specimen: Nares from Nose Updated: 07/02/24 1632     SARS-CoV-2 Negative     INFLUENZA A PCR Negative     INFLUENZA B PCR Negative     RSV PCR Negative    Narrative:      FOR PEDIATRIC PATIENTS - copy/paste COVID Guidelines URL to browser: [email or web address]    SARS-CoV-2 assay is a Nucleic Acid Amplification assay intended for the  qualitative detection of nucleic acid from SARS-CoV-2 in nasopharyngeal  swabs. Results are for the presumptive identification of SARS-CoV-2 RNA.    Positive results are indicative of infection with SARS-CoV-2, the virus  causing COVID-19, but do not rule out bacterial infection or co-infection  with other viruses. Laboratories within the [place] and its  territories are required to report all positive results to the appropriate  public health authorities. Negative results do not preclude SARS-CoV-2  infection and should not be used as the sole basis for treatment or other  patient management decisions. Negative results must be combined with  clinical observations, patient history, and epidemiological information.  This test has not been FDA cleared or approved.    This test has been authorized by FDA under an Emergency Use Authorization  (EUA). This test is only authorized for the duration of time the  declaration that circumstances exist justifying the authorization of the  emergency use of an in vitro diagnostic tests for detection of SARS-CoV-2  virus and/or diagnosis of COVID-19 infection under section 564(b)(1) of  the Act, 21 U.S.C. 360bbb-3(b)(1), unless the authorization is terminated  or revoked sooner. The test has been validated but independent review by FDA  and CLIA is pending.    Test performed using [employer] GeneXpert: This RT-PCR assay targets N2,  a region unique to SARS-CoV-2. A conserved region in the E-gene was chosen  for pan-Sarbecovirus  detection which includes SARS-CoV-2.    According to CMS-2020-01-R, this platform meets the definition of high-throughput technology.    Basic metabolic panel [[id number]]  (Abnormal) Collected: 07/02/24 1548    Lab Status: Final result Specimen: Blood from Arm, Left Updated: 07/02/24 1610     Sodium 141 mmol/L      Potassium 3.8 mmol/L      Chloride 103 mmol/L      CO2 29 mmol/L      ANION GAP 9 mmol/L      BUN 12 mg/dL      Creatinine 0.77 mg/dL      Glucose 175 mg/dL      Calcium 8.9 mg/dL      eGFR 91 ml/min/1.73sq m     Narrative:      National Kidney Disease Foundation guidelines for Chronic Kidney Disease (CKD):     Stage 1 with normal or high GFR (GFR > 90 mL/min/1.73 square meters)    Stage 2 Mild CKD (GFR = 60-89 mL/min/1.73 square meters)    Stage 3A Moderate CKD (GFR = 45-59 mL/min/1.73 square meters)    Stage 3B Moderate CKD (GFR = 30-44 mL/min/1.73 square meters)    Stage 4 Severe CKD (GFR = 15-29 mL/min/1.73 square meters)    Stage 5 End Stage CKD (GFR <15 mL/min/1.73 square meters)  Note: GFR calculation is accurate only with a steady state creatinine    Magnesium [[id number]]  (Abnormal) Collected: 07/02/24 1548    Lab Status: Final result Specimen: Blood from Arm, Left Updated: 07/02/24 1610     Magnesium 1.7 mg/dL     Levetiracetam level [[id number]] Collected: 07/02/24 1548    Lab Status: In process Specimen: Blood from Arm, Left Updated: 07/02/24 1551                   CT head wo contrast   Final Result by Freddie Fitzpatrick MD (07/02 1634)      Small amount of residual vasogenic edema in the superior right frontal lobe.   Significant decreased from the comparison CT/MRI from April 2024. No local mass effect or midline shift.   Known underlying lesion not well appreciated on this noncontrast CT.      No new lesions identified. No intracranial hemorrhage. No CT evidence of acute infarction.      Left greater than right maxillary sinusitis.                           Workstation performed:  [id number]                    Procedures  Procedures         ED Course                               SBIRT 22yo+      Flowsheet Row Most Recent Value   Initial Alcohol Screen: US AUDIT-C     1. How often do you have a drink containing alcohol? 0 Filed at: 07/02/2024 1543   2. How many drinks containing alcohol do you have on a typical day you are drinking?  0 Filed at: 07/02/2024 1543   3a. Male UNDER 65: How often do you have five or more drinks on one occasion? 0 Filed at: 07/02/2024 1543   3b. FEMALE Any Age, or MALE 65+: How often do you have 4 or more drinks on one occassion? 0 Filed at: 07/02/2024 1543   Audit-C Score 0 Filed at: 07/02/2024 1543   MARK: How many times in the past year have you...    Used an illegal drug or used a prescription medication for non-medical reasons? 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  Patient states he had a similar episode about a month ago was started on Keppra.  Patient states that he ran out of his medication has been taking over few weeks.  States started with the shaking of his left hand then throughout his body.  Denies loss of consciousness.  Patient states he was awake and aware during this process.  Patient is now awake alert. obtained.  Denies any chest pain shortness of breath any headache blurry vision double vision.  Differential diagnoses include worsening metastatic disease/cerebral edema/electro abnormality due to not taking medication  Plan will obtain lab patient is given Keppra will obtain Keppra level  Lab reviewed with normal limits this low low magnesium which was replaced.  Case discussed with neurology on-call  recommend to continue with Keppra and to follow-up patient.  CT did show improvement in vasogenic edema patient was given 10 of Decadron  Disposition Case discussed with the patient and  his wife was at bedside recommending continue with the medication at home urgent referral was provided for neurology.  Recommending following up strict question regarding return both verbalized understand the plan.    Amount and/or Complexity of Data Reviewed  Labs: ordered.  Radiology: ordered.    Risk  Prescription drug management.             Disposition  Final diagnoses:   Shaking   Hypomagnesemia   Seizure (HCC)     Time reflects when diagnosis was documented in both MDM as applicable and the Disposition within this note       Time User Action Codes Description Comment    7/2/2024  4:06 PM Ahchiara Long Add [R25.1] Shaking     7/2/2024  4:16 PM Ahmad, Long Add [E83.42] Hypomagnesemia     7/2/2024  4:45 PM Ahmad, Long Add [R56.9] Seizure (HCC)           ED Disposition       ED Disposition   Discharge    Condition   Stable    Date/Time   Tue Jul 2, 2024 1643    Comment   Paras Hong discharge to home/self care.                   Follow-up Information       Follow up With Specialties Details Why Contact Info    Maikel Valencia, DO Family Medicine Call today  [address] [place] [phone]              Patient's Medications   Discharge Prescriptions    LEVETIRACETAM (KEPPRA) 1000 MG TABLET    Take 1 tablet (1,000 mg total) by mouth 2 (two) times a day       Start Date: 7/2/2024  End Date: 8/31/2024       Order Dose: 1,000 mg       Quantity: 60 tablet    Refills: 1    PRAMIPEXOLE (MIRAPEX) 0.25 MG TABLET    Take 1 tablet (0.25 mg total) by mouth 3 (three) times a day       Start Date: 7/2/2024  End Date: 8/1/2024       Order Dose: 0.25 mg       Quantity: 90 tablet    Refills: 0       No discharge procedures on file.    PDMP Review         Value Time User    PDMP Reviewed  Yes 10/18/2023 10:02 AM Shante Connelly PA-C            ED Provider  Electronically Signed by             Long Avalos MD  07/02/24 4171

## 2024-07-03 ENCOUNTER — NURSE TRIAGE (OUTPATIENT)
Age: 69
End: 2024-07-03

## 2024-07-03 NOTE — TELEPHONE ENCOUNTER
Returned a call to wife lissette Ellison to get an update and asked her to phone me directly at office at [phone].

## 2024-07-03 NOTE — TELEPHONE ENCOUNTER
"Pt stated Oncology Provider:   Dr Langford    Actionable item:  Callback from the office     What is the reason for the call/chief complaint?    Patients wife called in stating that patient has been having bright red blood in loose stools. States it was first noticed two days ago, has slight bright red blood, yesterday had a normal BM with no blood, and today another loose BM with bright red blood that she described filled the toilet bowl. He has been having his normal one BM a day. Denies nay other associated symptoms and states he is feeling perfectly fine other wise. Pt has a history of hemorrhoids per wife and was not sure if this is what is causing the bleeding and wanted to let us know due to him being on Avastin.     Pts wife would like a call back with recommendations.     Priority:  HIGH      Reports not sure if its hemmorhoids   Reason for Disposition   Bloody, black, or tarry bowel movements (Exception: chronic-unchanged black-grey bowel movements and is taking iron pills or Pepto-Bismol)    Answer Assessment - Initial Assessment Questions  Patient currently on tx for avastin and taxotere- last had tx on 6/27  Hasn't had a problem with hemorrhoids     1. APPEARANCE of BLOOD: \"What color is it?\" \"Is it passed separately, on the surface of the stool, or mixed in with the stool?\"       Bright red blood present, Not streaky in stool. mixed in, unsure if it is his hemorrhoids   2. AMOUNT: \"How much blood was passed?\"       Per wife, most of the toilet bowl is red  3. FREQUENCY: \"How many times has blood been passed with the stools?\"       One time two days ago, today went once this morning  4. ONSET: \"When was the blood first seen in the stools?\" (Days or weeks)       Two days ago  5. DIARRHEA: \"Is there also some diarrhea?\" If Yes, ask: \"How many diarrhea stools were passed in past 24 hours?\"       Typically goes once or twice a day, BM normal otherwise  6. CONSTIPATION: \"Do you have constipation?\" If Yes, ask: " "\"How bad is it?\"      none  7. RECURRENT SYMPTOMS: \"Have you had blood in your stools before?\" If Yes, ask: \"When was the last time?\" and \"What happened that time?\"       Only happened two days ago for the first time   8. BLOOD THINNERS: \"Do you take any blood thinners?\" (e.g., Coumadin/warfarin, Pradaxa/dabigatran, aspirin)      Not on any blood thinners  9. OTHER SYMPTOMS: \"Do you have any other symptoms?\"  (e.g., abdominal pain, vomiting, dizziness, fever)      Denies any other symptoms    Protocols used: Rectal Bleeding-ADULT-[place]    "

## 2024-07-10 ENCOUNTER — TELEPHONE (OUTPATIENT)
Dept: HEMATOLOGY ONCOLOGY | Facility: CLINIC | Age: 69
End: 2024-07-10

## 2024-07-10 ENCOUNTER — OFFICE VISIT (OUTPATIENT)
Dept: HEMATOLOGY ONCOLOGY | Facility: CLINIC | Age: 69
End: 2024-07-10
Payer: COMMERCIAL

## 2024-07-10 VITALS
RESPIRATION RATE: 18 BRPM | TEMPERATURE: 98 F | WEIGHT: 197 LBS | BODY MASS INDEX: 29.18 KG/M2 | HEART RATE: 72 BPM | HEIGHT: 69 IN | OXYGEN SATURATION: 91 % | SYSTOLIC BLOOD PRESSURE: 112 MMHG | DIASTOLIC BLOOD PRESSURE: 70 MMHG

## 2024-07-10 DIAGNOSIS — G93.89 BRAIN MASS: ICD-10-CM

## 2024-07-10 DIAGNOSIS — C34.91 CARCINOMA OF RIGHT LUNG (HCC): Primary | ICD-10-CM

## 2024-07-10 DIAGNOSIS — C79.31 METASTASIS TO BRAIN (HCC): ICD-10-CM

## 2024-07-10 DIAGNOSIS — C79.9 METASTATIC ADENOCARCINOMA (HCC): ICD-10-CM

## 2024-07-10 DIAGNOSIS — K12.30 MUCOSITIS: Primary | ICD-10-CM

## 2024-07-10 DIAGNOSIS — R56.9 SEIZURE (HCC): ICD-10-CM

## 2024-07-10 PROCEDURE — 99215 OFFICE O/P EST HI 40 MIN: CPT | Performed by: INTERNAL MEDICINE

## 2024-07-10 RX ORDER — LEVETIRACETAM 1000 MG/1
1000 TABLET ORAL 2 TIMES DAILY
Qty: 60 TABLET | Refills: 2 | Status: SHIPPED | OUTPATIENT
Start: 2024-07-10

## 2024-07-10 RX ORDER — SODIUM CHLORIDE 9 MG/ML
20 INJECTION, SOLUTION INTRAVENOUS ONCE
Status: CANCELLED | OUTPATIENT
Start: 2024-07-18

## 2024-07-10 RX ORDER — GLY/CARB H.POLYMR A/POT HYDROX
5 SOLUTION, ORAL MUCOUS MEMBRANE 2 TIMES DAILY
Qty: 240 ML | Refills: 0 | Status: SHIPPED | OUTPATIENT
Start: 2024-07-10

## 2024-07-10 NOTE — PROGRESS NOTES
Hematology/Oncology Outpatient Follow-up  Paras Pitts 69 y.o. male 1955 [id number]    Date:  7/10/2024        Assessment and Plan:  1. Carcinoma of right lung (HCC)  Clinical stage JAY at diagnosis was (cT3, cN1, cM1b) S/P surgical resection of the oligometastatic brain lesion followed by SRS.  Status post 4 cycles of neoadjuvant chemotherapy carboplatin, Alimta and nivolumab 7/2023.  Status post right upper lobe lobectomy 9/1/2023. The final pathology was pT2b, pN2, G3.  R0.     Started on adjuvant immunotherapy with nivolumab 480 mg on every 4-week basis on 10/13/2023.  PET scan in March 2024 showed mediastinal adenopathy compatible with recurrence.  This was confirmed with bronchoscopy and EBUS guided biopsy on 4/16/2024 which showed metastatic disease in the 4R, 2R lymph node area.    His case was discussed with the radiation oncology team.  The decision was made to pursue 4 doses of Avastin to decrease the vasogenic edema in the brain since he was felt to have residual/recurrent disease in the brain according to the MRI from April 2024.      Instead of concurrent chemoradiation the decision was made to get him started on single agent Taxotere.  He is currently status post 3 cycles which he is tolerating relatively well.    We discussed pursuing a PET CT scan after cycle 4 to evaluate the status of his recurrent non-small cell lung cancer on the current treatment.  We will then act accordingly.    - CBC and differential; Future  - Comprehensive metabolic panel; Future  - Magnesium  - NM PET CT skull base to mid thigh; Future  - Ambulatory referral to Neurology; Future    2. Metastatic adenocarcinoma (HCC)  As above.  - CBC and differential; Future  - Comprehensive metabolic panel; Future  - Magnesium  - NM PET CT skull base to mid thigh; Future  - Ambulatory referral to Neurology; Future    3. Metastasis to brain (HCC)  He is status post surgical resection of the oligometastatic Brain lesion followed by  postoperative RT with SRS/SRT in 5 fractions to avoid local recurrence.    Brain MRI from April 2024 showed residual or recurrence of his disease.  He did receive 4 doses of Avastin to decrease the enhancement or vasogenic edema..  He will need to follow-up with the radiation oncology team in the near future.  - levETIRAcetam (Keppra) 1000 MG tablet; Take 1 tablet (1,000 mg total) by mouth 2 (two) times a day  Dispense: 60 tablet; Refill: 2    4. Brain mass  As above.    5. Seizure (HCC)  He was given a prescription for Keppra 1000 mg twice a day.  I referral to the neurology team to be seen as soon as possible to adjust his antiseizure medication.  - Ambulatory referral to Neurology; Future        HPI:  The patient came in today for follow-up visit via wheelchair accompanied by his wife.  He had a prolonged course of hospitalization and rehabilitation due to his brain metastasis.  He apparently run out of Keppra and had to be evaluated in the emergency room for seizure activity around 2 July where he was given a prescription for Keppra.  He is currently taking Keppra 1000 mg twice a day.  He also continues to be on dexamethasone 4 mg daily.  Recent CT scan of the head without contrast on 7-24 when he had seizure activity showed:  IMPRESSION:     Small amount of residual vasogenic edema in the superior right frontal lobe.  Significant decreased from the comparison CT/MRI from April 2024. No local mass effect or midline shift.  Known underlying lesion not well appreciated on this noncontrast CT.     No new lesions identified. No intracranial hemorrhage. No CT evidence of acute infarction.     Left greater than right maxillary sinusitis.      Oncology History   Metastatic adenocarcinoma (HCC)   2023 Initial Diagnosis    Metastatic adenocarcinoma (HCC)     3/23/2023 Biopsy    Brain, right frontal mass (biopsy):  - Metastatic non-small cell carcinoma     Comment:  - Tumor cells stain diffusely for CAM5.2, CKAE1/3, CK7,  TTF1 and NapsinA with absent CK20, p63, CK5/6, p40 expression.  This immunopanel favors a metastatic lung adenocarcinoma.       5/18/2023 - 7/20/2023 Chemotherapy    cyanocobalamin, 1,000 mcg, Intramuscular, Once, 3 of 3 cycles  Administration: 1,000 mcg (6/29/2023), 1,000 mcg (5/18/2023), 1,000 mcg (7/20/2023)  alteplase (CATHFLO), 2 mg, Intracatheter, Every 1 Minute as needed, 4 of 4 cycles  pegfilgrastim (NEULASTA ONPRO), 6 mg, Subcutaneous, Once, 3 of 3 cycles  Administration: 6 mg (6/8/2023), 6 mg (6/29/2023), 6 mg (7/20/2023)  fosaprepitant (EMEND) IVPB, 150 mg, Intravenous, Once, 4 of 4 cycles  Administration: 150 mg (5/18/2023), 150 mg (6/29/2023), 150 mg (7/20/2023), 150 mg (6/8/2023)  nivolumab (OPDIVO) IVPB, 360 mg (150 % of original dose 240 mg), Intravenous, Once, 4 of 4 cycles  Dose modification: 360 mg (original dose 240 mg, Cycle 1, Reason: Dose modified as per discussion with consulting physician)  Administration: 360 mg (5/18/2023), 360 mg (6/8/2023), 360 mg (6/29/2023), 360 mg (7/20/2023)  CARBOplatin (PARAPLATIN) IVPB (GOG AUC DOSING), 642.5 mg, Intravenous, Once, 4 of 4 cycles  Administration: 642.5 mg (5/18/2023), 642.5 mg (6/29/2023), 566.5 mg (7/20/2023), 650 mg (6/8/2023)  pemetrexed (ALIMTA) chemo infusion, 980 mg, Intravenous, Once, 4 of 4 cycles  Administration: 1,000 mg (5/18/2023), 1,000 mg (6/29/2023), 1,000 mg (7/20/2023), 1,000 mg (6/8/2023)     10/11/2023 - 10/11/2023 Chemotherapy    alteplase (CATHFLO), 2 mg, Intracatheter, Every 1 Minute as needed, 0 of 6 cycles  nivolumab (OPDIVO) IVPB, 240 mg, Intravenous, Once, 0 of 6 cycles     10/13/2023 - 2/23/2024 Chemotherapy    alteplase (CATHFLO), 2 mg, Intracatheter, Every 1 Minute as needed, 5 of 8 cycles  nivolumab (OPDIVO) IVPB, 480 mg, Intravenous, Once, 5 of 8 cycles  Administration: 480 mg (10/13/2023), 480 mg (11/10/2023), 480 mg (12/8/2023), 480 mg (1/5/2024), 480 mg (2/23/2024)     5/15/2024 -  Chemotherapy    alteplase  (CATHFLO), 2 mg, Intracatheter, Every 1 Minute as needed, 3 of 10 cycles  pegfilgrastim (NEULASTA), 6 mg, Subcutaneous, Once, 1 of 1 cycle  Administration: 6 mg (6/28/2024)  pegfilgrastim (NEULASTA ONPRO), 6 mg, Subcutaneous, Once, 3 of 10 cycles  Administration: 6 mg (5/15/2024), 6 mg (6/27/2024), 6 mg (6/6/2024)  bevacizumab (AVASTIN) IVPB, 5 mg/kg = 457.5 mg (100 % of original dose 5 mg/kg), Intravenous, Once, 3 of 3 cycles  Dose modification: 5 mg/kg (original dose 5 mg/kg, Cycle 1), 5 mg/kg (original dose 5 mg/kg, Cycle 1), 5 mg/kg (original dose 5 mg/kg, Cycle 2)  Administration: 457.5 mg (5/15/2024), 457.5 mg (5/30/2024), 457.5 mg (6/13/2024), 457.5 mg (6/27/2024)  DOCEtaxel (TAXOTERE) chemo infusion, 75 mg/m2 = 155.2 mg, Intravenous, Once, 3 of 10 cycles  Administration: 155.2 mg (5/15/2024), 155.2 mg (6/27/2024), 155.2 mg (6/6/2024)     Carcinoma of right lung (HCC)   4/13/2023 Initial Diagnosis    Carcinoma of right lung (HCC)     4/13/2023 -  Cancer Staged    Staging form: Lung, AJCC 8th Edition  - Clinical: Stage JAY (cT3, cN1, cM1b) - Signed by James Contreras MD on 4/13/2023 4/19/2023 - 4/28/2023 Radiation    Plan ID Energy Fractions Dose per Fraction (cGy) Dose Correction (cGy) Total Dose Delivered (cGy) Elapsed Days   SRT R Frontal 6X-FFF 5 / 5 600 0 3,000 9         5/18/2023 - 7/20/2023 Chemotherapy    cyanocobalamin, 1,000 mcg, Intramuscular, Once, 3 of 3 cycles  Administration: 1,000 mcg (6/29/2023), 1,000 mcg (5/18/2023), 1,000 mcg (7/20/2023)  alteplase (CATHFLO), 2 mg, Intracatheter, Every 1 Minute as needed, 4 of 4 cycles  pegfilgrastim (NEULASTA ONPRO), 6 mg, Subcutaneous, Once, 3 of 3 cycles  Administration: 6 mg (6/8/2023), 6 mg (6/29/2023), 6 mg (7/20/2023)  fosaprepitant (EMEND) IVPB, 150 mg, Intravenous, Once, 4 of 4 cycles  Administration: 150 mg (5/18/2023), 150 mg (6/29/2023), 150 mg (7/20/2023), 150 mg (6/8/2023)  nivolumab (OPDIVO) IVPB, 360 mg (150 % of original dose  240 mg), Intravenous, Once, 4 of 4 cycles  Dose modification: 360 mg (original dose 240 mg, Cycle 1, Reason: Dose modified as per discussion with consulting physician)  Administration: 360 mg (5/18/2023), 360 mg (6/8/2023), 360 mg (6/29/2023), 360 mg (7/20/2023)  CARBOplatin (PARAPLATIN) IVPB (GOG AUC DOSING), 642.5 mg, Intravenous, Once, 4 of 4 cycles  Administration: 642.5 mg (5/18/2023), 642.5 mg (6/29/2023), 566.5 mg (7/20/2023), 650 mg (6/8/2023)  pemetrexed (ALIMTA) chemo infusion, 980 mg, Intravenous, Once, 4 of 4 cycles  Administration: 1,000 mg (5/18/2023), 1,000 mg (6/29/2023), 1,000 mg (7/20/2023), 1,000 mg (6/8/2023)     9/1/2023 -  Cancer Staged    Staging form: Lung, AJCC 8th Edition  - Pathologic stage from 9/1/2023: Stage JAY (pT2b, pN2, cM1b) - Signed by Treva Bah PA-C on 9/20/2023  Histopathologic type: Adenocarcinoma, NOS  Stage prefix: Initial diagnosis  Histologic grade (G): G3  Histologic grading system: 4 grade system       9/1/2023 Surgery    Right robotic converted to open upper lobectomy      9/1/2023 Biopsy    A.  Lung, right upper lobe:     - Invasive poorly differentiated solid adenocarcinoma of the lung, 4.4 cm.     - Tumor invades into, but not through, the visceral pleura (PL1), confirmed by special VVG stains.     - Lymphatic channel invasion by tumor identified.     - Metastatic carcinoma present in three of thirteen lymph nodes (3/13).       -- Rare fibrotic granulomas present.     - All margins are negative for tumor.     - Emphysematous changes.     B.  Lymph node, level 8:     - Negative for malignancy (0/1).     C.  Lymph node, level 7, #1:     - Negative for malignancy (0/1).     D.  Lymph node, level 4R:     - Negative for malignancy (0/1).     E.  Lymph node, level 4R, #2:     - Fibrovascular adipose tissue; negative for malignancy.     - No lymphoid tissue identified.     F.  Lymph node, level 2R, #1:     - Metastatic carcinoma present in one of three lymph nodes  (1/3).     G.  Lymph node, level 2R, #2:     - Negative for malignancy (0/1).     H.  Lymph node, level 11 posterior:     - Single lymph node positive for metastatic carcinoma (1/1).     I.  Lymph node, portion of level 12 on artery:     - Negative for malignancy (0/1).     - Non-caseating granulomatous inflammation present.        -- Special stains for acid fast bacilli and fungal organisms are negative.        10/11/2023 - 10/11/2023 Chemotherapy    alteplase (CATHFLO), 2 mg, Intracatheter, Every 1 Minute as needed, 0 of 6 cycles  nivolumab (OPDIVO) IVPB, 240 mg, Intravenous, Once, 0 of 6 cycles     10/13/2023 - 2/23/2024 Chemotherapy    alteplase (CATHFLO), 2 mg, Intracatheter, Every 1 Minute as needed, 5 of 8 cycles  nivolumab (OPDIVO) IVPB, 480 mg, Intravenous, Once, 5 of 8 cycles  Administration: 480 mg (10/13/2023), 480 mg (11/10/2023), 480 mg (12/8/2023), 480 mg (1/5/2024), 480 mg (2/23/2024)     5/15/2024 -  Chemotherapy    alteplase (CATHFLO), 2 mg, Intracatheter, Every 1 Minute as needed, 3 of 10 cycles  pegfilgrastim (NEULASTA), 6 mg, Subcutaneous, Once, 1 of 1 cycle  Administration: 6 mg (6/28/2024)  pegfilgrastim (NEULASTA ONPRO), 6 mg, Subcutaneous, Once, 3 of 10 cycles  Administration: 6 mg (5/15/2024), 6 mg (6/27/2024), 6 mg (6/6/2024)  bevacizumab (AVASTIN) IVPB, 5 mg/kg = 457.5 mg (100 % of original dose 5 mg/kg), Intravenous, Once, 3 of 3 cycles  Dose modification: 5 mg/kg (original dose 5 mg/kg, Cycle 1), 5 mg/kg (original dose 5 mg/kg, Cycle 1), 5 mg/kg (original dose 5 mg/kg, Cycle 2)  Administration: 457.5 mg (5/15/2024), 457.5 mg (5/30/2024), 457.5 mg (6/13/2024), 457.5 mg (6/27/2024)  DOCEtaxel (TAXOTERE) chemo infusion, 75 mg/m2 = 155.2 mg, Intravenous, Once, 3 of 10 cycles  Administration: 155.2 mg (5/15/2024), 155.2 mg (6/27/2024), 155.2 mg (6/6/2024)         Interval history:    ROS: Review of Systems   Constitutional:  Positive for appetite change. Negative for chills and fever.    HENT:  Positive for mouth sores. Negative for ear pain and sore throat.    Eyes:  Negative for pain and visual disturbance.   Respiratory:  Positive for shortness of breath. Negative for cough.    Cardiovascular:  Negative for chest pain and palpitations.   Gastrointestinal:  Negative for abdominal pain and vomiting.   Genitourinary:  Negative for dysuria and hematuria.   Musculoskeletal:  Negative for arthralgias and back pain.   Skin:  Negative for color change and rash.   Neurological:  Negative for seizures and syncope.   Hematological:  Bruises/bleeds easily.   All other systems reviewed and are negative.      Past Medical History:   Diagnosis Date    Brain compression (HCC) 03/20/2023    Brain mass 03/20/2023    Cancer (HCC) 2001    Receint lung and brain lesions and prostate cancer in 2001    Cerebral edema (HCC) 03/20/2023    Hypertension     Prostate cancer (HCC) 2001    Rectal bleeding     Stroke (HCC)     2011         Past Surgical History:   Procedure Laterality Date    COLONOSCOPY      CRANIOTOMY Right 3/23/2023    Procedure: Right frontal CRANIOTOMY IMAGE-GUIDED FOR TUMOR;  Surgeon: Clark Carrillo MD;  [facility];  Service: Neurosurgery    ENDOBRONCHIAL ULTRASOUND (EBUS) N/A 4/16/2024    Procedure: ENDOBRONCHIAL ULTRASOUND (EBUS);  Surgeon: Kalia Sparrow MD;  Location[facility];  Service: Thoracic    IR PORT PLACEMENT  4/27/2023    [facility] INCL FLUOR GDNCE DX W/CELL WASHG SPX N/A 9/1/2023    Procedure: BRONCHOSCOPY FLEXIBLE;  Surgeon: Kalia Sparrow MD;  [facility];  Service: Thoracic    [place] [facility] INCL FLUOR GDNCE DX W/CELL WASHG SPX N/A 4/16/2024    Procedure: BRONCHOSCOPY FLEXIBLE;  Surgeon: Kalia Sparrow MD;  [facility];  Service: Thoracic    TN CYSTOURETHROSCOPY N/A 3/23/2023    Procedure: EUA, DEL CASTILLO INSERTION;  Surgeon: Ajay Piedra MD;  [facility];  Service: Urology    TN THORACOSCOPY W/LOBECTOMY SINGLE LOBE Right 9/1/2023     Procedure: robotic assisted converted to open right upper lobectomy, lymph node dissection;  Surgeon: Kalia Sparrow MD;  [facility];  Service: Thoracic    PROSTATECTOMY  2001    THORACOTOMY Right 2023    Procedure: right thoracotomy with Cryo-Ablation;  Surgeon: Kalia Sparrow MD;  [facility];  Service: Thoracic    TONSILLECTOMY         Social History     Socioeconomic History    Marital status: /Civil Union     Spouse name: None    Number of children: None    Years of education: None    Highest education level: None   Occupational History    None   Tobacco Use    Smoking status: Former     Current packs/day: 0.00     Average packs/day: 1 pack/day for 15.0 years (15.0 ttl pk-yrs)     Types: Cigarettes     Start date:      Quit date:      Years since quittin.5     Passive exposure: Never    Smokeless tobacco: Never    Tobacco comments:     i quit when i was 28. not sure of exact dates   Vaping Use    Vaping status: Never Used   Substance and Sexual Activity    Alcohol use: Not Currently     Alcohol/week: 1.0 standard drink of alcohol     Types: 1 Shots of liquor per week     Comment: socially    Drug use: Yes     Types: Marijuana     Comment: gummies foro nausea with chemo    Sexual activity: Not Currently   Other Topics Concern    None   Social History Narrative    None     Social Determinants of Health     Financial Resource Strain: Not on file   Food Insecurity: No Food Insecurity (2024)    Hunger Vital Sign     Worried About Running Out of Food in the Last Year: Never true     Ran Out of Food in the Last Year: Never true   Transportation Needs: No Transportation Needs (2024)    PRAPARE - Transportation     Lack of Transportation (Medical): No     Lack of Transportation (Non-Medical): No   Physical Activity: Not on file   Stress: Not on file   Social Connections: Not on file   Intimate Partner Violence: Not on file   Housing Stability: Low Risk   (2024)    Housing Stability Vital Sign     Unable to Pay for Housing in the Last Year: No     Number of Times Moved in the Last Year: 1     Homeless in the Last Year: No       Family History   Problem Relation Age of Onset    Dementia Mother             Breast cancer Sister             Prostate cancer Brother         Received Radiation       No Known Allergies      Current Outpatient Medications:     acetaminophen (TYLENOL) 325 mg tablet, Take 2 tablets (650 mg total) by mouth every 6 (six) hours as needed for mild pain or fever, Disp: , Rfl:     amLODIPine (NORVASC) 10 mg tablet, Take 1 tablet (10 mg total) by mouth daily, Disp: 30 tablet, Rfl: 0    atorvastatin (LIPITOR) 40 mg tablet, Take 1 tablet (40 mg total) by mouth daily after dinner, Disp: 30 tablet, Rfl: 0    dexamethasone (DECADRON) 4 mg tablet, Take 1 tablet (4 mg total) by mouth daily, Disp: 30 tablet, Rfl: 0    dexamethasone (DECADRON) 4 mg tablet, Take 2 tablets (8 mg total) by mouth 2 (two) times a day with meals TAKE 2 TABS (8MG) TWICE A DAY FOR 3 DAYS THE DAY BEFORE CHEMO DAY OF CHEMO AND DAY AFTER CHEMO EVERY 3 WEEKS, Disp: 12 tablet, Rfl: 6    diphenhydramine, lidocaine, Al/Mg hydroxide, simethicone (Magic Mouthwash) SUSP, Swish and spit 10 mL every 4 (four) hours as needed for mouth pain or discomfort, Disp: 480 mL, Rfl: 3    ipratropium-albuterol (DUO-NEB) 0.5-2.5 mg/3 mL nebulizer solution, Take 3 mL by nebulization 3 (three) times a day, Disp: 270 mL, Rfl: 0    levETIRAcetam (Keppra) 1000 MG tablet, Take 1 tablet (1,000 mg total) by mouth 2 (two) times a day, Disp: 60 tablet, Rfl: 1    levETIRAcetam (Keppra) 1000 MG tablet, Take 1 tablet (1,000 mg total) by mouth 2 (two) times a day, Disp: 60 tablet, Rfl: 2    loperamide (IMODIUM A-D) 2 MG tablet, Take 2 mg by mouth 4 (four) times a day as needed for diarrhea. Indications: Diarrhea caused by Chemotherapy, Disp: , Rfl:     losartan (COZAAR) 50 mg tablet, Take 1 tablet (50  "mg total) by mouth daily, Disp: 30 tablet, Rfl: 0    pantoprazole (PROTONIX) 40 mg tablet, Take 1 tablet (40 mg total) by mouth daily, Disp: 30 tablet, Rfl: 0    pramipexole (MIRAPEX) 0.25 mg tablet, Take 1 tablet (0.25 mg total) by mouth 3 (three) times a day, Disp: 90 tablet, Rfl: 0    pramipexole (MIRAPEX) 0.25 mg tablet, Take 1 tablet (0.25 mg total) by mouth 3 (three) times a day, Disp: 90 tablet, Rfl: 0    cloBAZam (ONFI) 10 MG tablet, Take 0.5 tablets (5 mg total) by mouth 2 (two) times a day for 10 days, Disp: 10 tablet, Rfl: 0    lacosamide (VIMPAT) 200 mg tablet, Take 1 tablet (200 mg total) by mouth every 12 (twelve) hours for 10 days, Disp: 20 tablet, Rfl: 0    levETIRAcetam (KEPPRA) 1000 MG tablet, Take 2 tablets (2,000 mg total) by mouth every 12 (twelve) hours, Disp: 120 tablet, Rfl: 0      Physical Exam:  /70 (BP Location: Right arm, Patient Position: Sitting, Cuff Size: Adult)   Pulse 72   Temp 98 °F (36.7 °C)   Resp 18   Ht 5' 9\" (1.753 m)   Wt 89.4 kg (197 lb)   SpO2 91%   BMI 29.09 kg/m²     Physical Exam  Constitutional:       Appearance: He is well-developed.   HENT:      Head: Normocephalic and atraumatic.      Nose: Nose normal.   Eyes:      General: No scleral icterus.        Right eye: No discharge.         Left eye: No discharge.      Conjunctiva/sclera: Conjunctivae normal.      Pupils: Pupils are equal, round, and reactive to light.   Neck:      Thyroid: No thyromegaly.      Trachea: No tracheal deviation.   Cardiovascular:      Rate and Rhythm: Normal rate and regular rhythm.      Heart sounds: Normal heart sounds. No murmur heard.     No friction rub.   Pulmonary:      Effort: Pulmonary effort is normal. No respiratory distress.      Breath sounds: Normal breath sounds. No wheezing or rales.   Chest:      Chest wall: No tenderness.   Abdominal:      General: There is no distension.      Palpations: Abdomen is soft. There is no hepatomegaly or splenomegaly.      Tenderness: " There is no abdominal tenderness. There is no guarding or rebound.   Musculoskeletal:         General: No tenderness or deformity.      Cervical back: Normal range of motion and neck supple.      Comments: Ambulatory dysfunction.   Lymphadenopathy:      Cervical: No cervical adenopathy.   Skin:     General: Skin is warm and dry.      Coloration: Skin is not pale.      Findings: No erythema.   Neurological:      Mental Status: He is alert and oriented to person, place, and time.      Cranial Nerves: No cranial nerve deficit.      Coordination: Coordination normal.      Deep Tendon Reflexes: Reflexes are normal and symmetric.   Psychiatric:         Behavior: Behavior normal.         Thought Content: Thought content normal.         Judgment: Judgment normal.           Labs:  Lab Results   Component Value Date    WBC 3.87 (L) 07/02/2024    HGB 12.8 07/02/2024    HCT 39.6 07/02/2024     (H) 07/02/2024    PLT 88 (L) 07/02/2024     Lab Results   Component Value Date     05/10/2018    K 3.8 07/02/2024     07/02/2024    CO2 29 07/02/2024    ANIONGAP 11.6 05/10/2018    BUN 12 07/02/2024    CREATININE 0.77 07/02/2024    GLUCOSE 190 (H) 09/01/2023    GLUF 143 (H) 05/20/2024    CALCIUM 8.9 07/02/2024    AST 15 06/26/2024    ALT 17 06/26/2024    ALKPHOS 60 06/26/2024    PROT 7.2 05/10/2018    BILITOT 0.8 05/10/2018    EGFR 91 07/02/2024     Lab Results   Component Value Date    TSH 3.73 09/12/2022       Patient voiced understanding and agreement in the above discussion. Aware to contact our office with questions/symptoms in the interim.

## 2024-07-16 DIAGNOSIS — E78.00 HYPERCHOLESTEROLEMIA: ICD-10-CM

## 2024-07-16 DIAGNOSIS — C79.31 METASTASIS TO BRAIN (HCC): ICD-10-CM

## 2024-07-16 NOTE — TELEPHONE ENCOUNTER
PLEASE NOTE: 2 MEDICATIONS      Medication: Decadron    Dose/Frequency: 4 mg orally daily    Quantity: 30    Pharmacy: [facility], [place] [address]   [address]    Office:   [x] PCP/Provider - Collin Langford MD  [] Speciality/Provider -     Does the patient have enough for 3 days?   [x] Yes   [] No - Send as HP to POD    Medication: Pantoprazole    Dose/Frequency: 40 mg daily    Quantity: 30    Pharmacy: As above    Office:   [x] PCP/Provider - Dr Langford  [] Speciality/Provider -     Does the patient have enough for 3 days?   [x] Yes   [] No - Send as HP to POD

## 2024-07-17 ENCOUNTER — HOSPITAL ENCOUNTER (OUTPATIENT)
Dept: INFUSION CENTER | Facility: HOSPITAL | Age: 69
Discharge: HOME | End: 2024-07-17
Payer: COMMERCIAL

## 2024-07-17 VITALS — TEMPERATURE: 97.9 F

## 2024-07-17 DIAGNOSIS — C34.91 CARCINOMA OF RIGHT LUNG (HCC): ICD-10-CM

## 2024-07-17 DIAGNOSIS — T45.1X5A CHEMOTHERAPY-INDUCED NEUTROPENIA (HCC): ICD-10-CM

## 2024-07-17 DIAGNOSIS — D70.1 CHEMOTHERAPY-INDUCED NEUTROPENIA (HCC): ICD-10-CM

## 2024-07-17 DIAGNOSIS — C79.9 METASTATIC ADENOCARCINOMA (HCC): ICD-10-CM

## 2024-07-17 LAB
ALBUMIN SERPL BCG-MCNC: 3.5 G/DL (ref 3.5–5)
ALP SERPL-CCNC: 53 U/L (ref 34–104)
ALT SERPL W P-5'-P-CCNC: 10 U/L (ref 7–52)
ANION GAP SERPL CALCULATED.3IONS-SCNC: 9 MMOL/L (ref 4–13)
ANISOCYTOSIS BLD QL SMEAR: PRESENT
AST SERPL W P-5'-P-CCNC: 13 U/L (ref 13–39)
BASOPHILS # BLD MANUAL: 0.1 THOUSAND/UL (ref 0–0.1)
BASOPHILS NFR MAR MANUAL: 1 % (ref 0–1)
BILIRUB SERPL-MCNC: 0.78 MG/DL (ref 0.2–1)
BUN SERPL-MCNC: 8 MG/DL (ref 5–25)
CALCIUM SERPL-MCNC: 8.2 MG/DL (ref 8.4–10.2)
CHLORIDE SERPL-SCNC: 108 MMOL/L (ref 96–108)
CO2 SERPL-SCNC: 27 MMOL/L (ref 21–32)
CREAT SERPL-MCNC: 0.88 MG/DL (ref 0.6–1.3)
DACRYOCYTES BLD QL SMEAR: PRESENT
EOSINOPHIL # BLD MANUAL: 0 THOUSAND/UL (ref 0–0.4)
EOSINOPHIL NFR BLD MANUAL: 0 % (ref 0–6)
ERYTHROCYTE [DISTWIDTH] IN BLOOD BY AUTOMATED COUNT: 15.3 % (ref 11.6–15.1)
GFR SERPL CREATININE-BSD FRML MDRD: 86 ML/MIN/1.73SQ M
GLUCOSE SERPL-MCNC: 105 MG/DL (ref 65–140)
HCT VFR BLD AUTO: 32.5 % (ref 36.5–49.3)
HGB BLD-MCNC: 10 G/DL (ref 12–17)
LYMPHOCYTES # BLD AUTO: 1.44 THOUSAND/UL (ref 0.6–4.47)
LYMPHOCYTES # BLD AUTO: 13 % (ref 14–44)
MAGNESIUM SERPL-MCNC: 1.3 MG/DL (ref 1.9–2.7)
MCH RBC QN AUTO: 31.1 PG (ref 26.8–34.3)
MCHC RBC AUTO-ENTMCNC: 30.8 G/DL (ref 31.4–37.4)
MCV RBC AUTO: 101 FL (ref 82–98)
METAMYELOCYTE ABSOLUTE CT: 0.1 THOUSAND/UL (ref 0–0.1)
METAMYELOCYTES NFR BLD MANUAL: 1 % (ref 0–1)
MONOCYTES # BLD AUTO: 1.15 THOUSAND/UL (ref 0–1.22)
MONOCYTES NFR BLD: 12 % (ref 4–12)
NEUTROPHILS # BLD MANUAL: 6.82 THOUSAND/UL (ref 1.85–7.62)
NEUTS BAND NFR BLD MANUAL: 6 % (ref 0–8)
NEUTS SEG NFR BLD AUTO: 65 % (ref 43–75)
OVALOCYTES BLD QL SMEAR: PRESENT
PLATELET # BLD AUTO: 263 THOUSANDS/UL (ref 149–390)
PLATELET BLD QL SMEAR: ADEQUATE
PMV BLD AUTO: 9.4 FL (ref 8.9–12.7)
POIKILOCYTOSIS BLD QL SMEAR: PRESENT
POLYCHROMASIA BLD QL SMEAR: PRESENT
POTASSIUM SERPL-SCNC: 3.4 MMOL/L (ref 3.5–5.3)
PROT SERPL-MCNC: 6.2 G/DL (ref 6.4–8.4)
RBC # BLD AUTO: 3.22 MILLION/UL (ref 3.88–5.62)
RBC MORPH BLD: PRESENT
SODIUM SERPL-SCNC: 144 MMOL/L (ref 135–147)
VARIANT LYMPHS # BLD AUTO: 2 %
WBC # BLD AUTO: 9.6 THOUSAND/UL (ref 4.31–10.16)

## 2024-07-17 PROCEDURE — 83735 ASSAY OF MAGNESIUM: CPT

## 2024-07-17 PROCEDURE — 80053 COMPREHEN METABOLIC PANEL: CPT | Performed by: INTERNAL MEDICINE

## 2024-07-17 PROCEDURE — 85027 COMPLETE CBC AUTOMATED: CPT | Performed by: INTERNAL MEDICINE

## 2024-07-17 PROCEDURE — 85007 BL SMEAR W/DIFF WBC COUNT: CPT | Performed by: INTERNAL MEDICINE

## 2024-07-17 RX ORDER — ATORVASTATIN CALCIUM 40 MG/1
40 TABLET, FILM COATED ORAL
Qty: 30 TABLET | Refills: 0 | Status: SHIPPED | OUTPATIENT
Start: 2024-07-17

## 2024-07-17 RX ORDER — PANTOPRAZOLE SODIUM 40 MG/1
40 TABLET, DELAYED RELEASE ORAL DAILY
Qty: 30 TABLET | Refills: 11 | Status: SHIPPED | OUTPATIENT
Start: 2024-07-17

## 2024-07-17 NOTE — PROGRESS NOTES
Paras Pitts  tolerated treatment well with no complications.  Labs obtained via port without difficulty.    Paras Pitts is aware of future appt on 7/18 at 12:30 pm.     AVS declined by Paras Pitts.

## 2024-07-18 ENCOUNTER — HOSPITAL ENCOUNTER (OUTPATIENT)
Dept: INFUSION CENTER | Facility: HOSPITAL | Age: 69
End: 2024-07-18
Attending: INTERNAL MEDICINE
Payer: COMMERCIAL

## 2024-07-18 VITALS
OXYGEN SATURATION: 97 % | RESPIRATION RATE: 16 BRPM | BODY MASS INDEX: 29.52 KG/M2 | SYSTOLIC BLOOD PRESSURE: 122 MMHG | HEART RATE: 99 BPM | DIASTOLIC BLOOD PRESSURE: 78 MMHG | TEMPERATURE: 97.4 F | HEIGHT: 69 IN | WEIGHT: 199.3 LBS

## 2024-07-18 DIAGNOSIS — C34.91 CARCINOMA OF RIGHT LUNG (HCC): ICD-10-CM

## 2024-07-18 DIAGNOSIS — C79.9 METASTATIC ADENOCARCINOMA (HCC): Primary | ICD-10-CM

## 2024-07-18 DIAGNOSIS — T45.1X5A CHEMOTHERAPY-INDUCED NEUTROPENIA (HCC): ICD-10-CM

## 2024-07-18 DIAGNOSIS — D70.1 CHEMOTHERAPY-INDUCED NEUTROPENIA (HCC): ICD-10-CM

## 2024-07-18 PROCEDURE — 96413 CHEMO IV INFUSION 1 HR: CPT

## 2024-07-18 PROCEDURE — 96367 TX/PROPH/DG ADDL SEQ IV INF: CPT

## 2024-07-18 PROCEDURE — 96377 APPLICATON ON-BODY INJECTOR: CPT

## 2024-07-18 RX ORDER — MAGNESIUM SULFATE HEPTAHYDRATE 40 MG/ML
4 INJECTION, SOLUTION INTRAVENOUS ONCE
Status: COMPLETED | OUTPATIENT
Start: 2024-07-18 | End: 2024-07-18

## 2024-07-18 RX ORDER — MAGNESIUM SULFATE HEPTAHYDRATE 40 MG/ML
4 INJECTION, SOLUTION INTRAVENOUS ONCE
Status: CANCELLED
Start: 2024-07-18

## 2024-07-18 RX ORDER — SODIUM CHLORIDE 9 MG/ML
20 INJECTION, SOLUTION INTRAVENOUS ONCE
Status: COMPLETED | OUTPATIENT
Start: 2024-07-18 | End: 2024-07-18

## 2024-07-18 RX ADMIN — DOCETAXEL 155.2 MG: 20 INJECTION, SOLUTION, CONCENTRATE INTRAVENOUS at 13:58

## 2024-07-18 RX ADMIN — DEXAMETHASONE SODIUM PHOSPHATE: 10 INJECTION, SOLUTION INTRAMUSCULAR; INTRAVENOUS at 13:04

## 2024-07-18 RX ADMIN — PEGFILGRASTIM 6 MG: KIT SUBCUTANEOUS at 15:45

## 2024-07-18 RX ADMIN — MAGNESIUM SULFATE HEPTAHYDRATE 4 G: 40 INJECTION, SOLUTION INTRAVENOUS at 14:06

## 2024-07-18 RX ADMIN — SODIUM CHLORIDE 20 ML/HR: 0.9 INJECTION, SOLUTION INTRAVENOUS at 13:04

## 2024-07-18 NOTE — PROGRESS NOTES
Recent labs reviewed.  Patient tolerated IV magnesium and Taxotere chemotherapy treatment without reaction or issues. Neulasta onpro applied to left arm per patient's request.  Patient advised verbally and in witting when to remove tomorrow.  Patient verbalized understanding of above. Neulasta Onpro full and blinking green upon discharge.      Paras Pitts is aware of future appt on 8/7/24 at 1130.     AVS printed and given to Paras Pitts:  Yes.      Patient taken in wheelchair by wife off unit without incident.  All personal belongings taken with patient.

## 2024-08-01 DIAGNOSIS — Z76.0 MEDICATION REFILL: ICD-10-CM

## 2024-08-01 DIAGNOSIS — R56.9 SEIZURE (HCC): ICD-10-CM

## 2024-08-01 DIAGNOSIS — I10 HYPERTENSION: ICD-10-CM

## 2024-08-01 DIAGNOSIS — C79.31 METASTASIS TO BRAIN (HCC): ICD-10-CM

## 2024-08-01 RX ORDER — SODIUM CHLORIDE 9 MG/ML
20 INJECTION, SOLUTION INTRAVENOUS ONCE
OUTPATIENT
Start: 2024-08-08

## 2024-08-02 RX ORDER — PRAMIPEXOLE DIHYDROCHLORIDE 0.25 MG/1
0.25 TABLET ORAL 3 TIMES DAILY
Qty: 90 TABLET | Refills: 0 | Status: SHIPPED | OUTPATIENT
Start: 2024-08-02 | End: 2024-09-01

## 2024-08-02 RX ORDER — LEVETIRACETAM 1000 MG/1
1000 TABLET ORAL 2 TIMES DAILY
Qty: 60 TABLET | Refills: 0 | Status: SHIPPED | OUTPATIENT
Start: 2024-08-02

## 2024-08-02 RX ORDER — LOSARTAN POTASSIUM 50 MG/1
50 TABLET ORAL DAILY
Qty: 30 TABLET | Refills: 0 | Status: SHIPPED | OUTPATIENT
Start: 2024-08-02

## 2024-08-05 ENCOUNTER — HOSPITAL ENCOUNTER (OUTPATIENT)
Dept: RADIOLOGY | Age: 69
Discharge: HOME | End: 2024-08-05
Payer: COMMERCIAL

## 2024-08-05 DIAGNOSIS — C79.9 METASTATIC ADENOCARCINOMA (HCC): ICD-10-CM

## 2024-08-05 DIAGNOSIS — C34.91 CARCINOMA OF RIGHT LUNG (HCC): ICD-10-CM

## 2024-08-05 LAB — GLUCOSE SERPL-MCNC: 94 MG/DL (ref 65–140)

## 2024-08-05 PROCEDURE — 82948 REAGENT STRIP/BLOOD GLUCOSE: CPT

## 2024-08-05 PROCEDURE — 78815 PET IMAGE W/CT SKULL-THIGH: CPT

## 2024-08-05 PROCEDURE — A9552 F18 FDG: HCPCS

## 2024-08-05 NOTE — LETTER
[facility]  [address] [place] [address]      August 9, 2024    MRN: [id number]     Phone: [phone]     Dear Mr. Pitts,    You recently had a(n) Nuclear Medicine performed on 8/5/2024 at  [facility] that was requested by Collin Langford MD. The study was reviewed by a radiologist, which is a physician who specializes in medical imaging. The radiologist issued a report describing his or her findings. In that report there was a finding that the radiologist felt warranted further discussion with your health care provider and that discussion would be beneficial to you.      The results were sent to Collin Langford MD on 08/06/2024  8:08 AM. We recommend that you contact Collin Langford MD at [phone] or set up an appointment to discuss the results of the imaging test. If you have already heard from Collin Langford MD regarding the results of your study, you can disregard this letter.     This letter is not meant to alarm you, but intended to encourage you to follow-up on your results with the provider that sent you for the imaging study. In addition, we have enclosed answers to frequently asked questions by other patients who have also received a letter to review results with their health care provider (see page two).      Thank you for choosing [facility] for your medical imaging needs.                                                                                                                                                        FREQUENTLY ASKED QUESTIONS    Why am I receiving this letter?  [place] State Law requires us to notify patients who have findings on imaging exams that may require more testing or follow-up with a health professional within the next 3 months.        How serious is the finding on the imaging test?  This letter is sent to all patients who may need follow-up or more testing within the next 3 months.  Receiving  this letter does not necessarily mean you have a life-threatening imaging finding or disease.  Recommendations in the radiologist’s imaging report are general in nature and it is up to your healthcare provider to say whether those recommendations make sense for your situation.  You are strongly encouraged to talk to your health care provider about the results and ask whether additional steps need to be taken.    Where can I get a copy of the final report for my recent radiology exam?  To get a full copy of the report you can access your records online at [email or web address] or please contact [facility]’s Medical Records Department at [phone] Monday through Friday between 8 am and 6 pm.         What do I need to do now?           Please contact your health care provider who requested the imaging study to discuss what further actions (if any) are needed.  You may have already heard from (your ordering provider) in regard to this test in which case you can disregard this letter.        NOTICE IN ACCORDANCE WITH THE [place] STATE “PATIENT TEST RESULT INFORMATION ACT OF 2018”    You are receiving this notice as a result of a determination by your diagnostic imaging service that further discussions of your test results are warranted and would be beneficial to you.    The complete results of your test or tests have been or will be sent to the health care practitioner that ordered the test or tests. It is recommended that you contact your health care practitioner to discuss your results as soon as possible.

## 2024-08-06 ENCOUNTER — TELEPHONE (OUTPATIENT)
Age: 69
End: 2024-08-06

## 2024-08-06 NOTE — TELEPHONE ENCOUNTER
Марина from reading room called to report immediate findings on PET CT skull base to mid thigh completed on 08/5.

## 2024-08-07 ENCOUNTER — HOSPITAL ENCOUNTER (OUTPATIENT)
Dept: INFUSION CENTER | Facility: HOSPITAL | Age: 69
Discharge: HOME | End: 2024-08-07
Payer: COMMERCIAL

## 2024-08-07 DIAGNOSIS — C34.91 CARCINOMA OF RIGHT LUNG (HCC): ICD-10-CM

## 2024-08-07 DIAGNOSIS — T45.1X5A CHEMOTHERAPY-INDUCED NEUTROPENIA (HCC): ICD-10-CM

## 2024-08-07 DIAGNOSIS — C79.9 METASTATIC ADENOCARCINOMA (HCC): Primary | ICD-10-CM

## 2024-08-07 DIAGNOSIS — Z45.2 ENCOUNTER FOR CENTRAL LINE CARE: ICD-10-CM

## 2024-08-07 DIAGNOSIS — D70.1 CHEMOTHERAPY-INDUCED NEUTROPENIA (HCC): ICD-10-CM

## 2024-08-07 LAB
ALBUMIN SERPL BCG-MCNC: 3.8 G/DL (ref 3.5–5)
ALP SERPL-CCNC: 49 U/L (ref 34–104)
ALT SERPL W P-5'-P-CCNC: 9 U/L (ref 7–52)
ANION GAP SERPL CALCULATED.3IONS-SCNC: 8 MMOL/L (ref 4–13)
AST SERPL W P-5'-P-CCNC: 15 U/L (ref 13–39)
BASOPHILS # BLD AUTO: 0.11 THOUSANDS/ÂΜL (ref 0–0.1)
BASOPHILS NFR BLD AUTO: 1 % (ref 0–1)
BILIRUB SERPL-MCNC: 0.59 MG/DL (ref 0.2–1)
BUN SERPL-MCNC: 6 MG/DL (ref 5–25)
CALCIUM SERPL-MCNC: 8.7 MG/DL (ref 8.4–10.2)
CHLORIDE SERPL-SCNC: 107 MMOL/L (ref 96–108)
CO2 SERPL-SCNC: 28 MMOL/L (ref 21–32)
CREAT SERPL-MCNC: 0.71 MG/DL (ref 0.6–1.3)
EOSINOPHIL # BLD AUTO: 0.01 THOUSAND/ÂΜL (ref 0–0.61)
EOSINOPHIL NFR BLD AUTO: 0 % (ref 0–6)
ERYTHROCYTE [DISTWIDTH] IN BLOOD BY AUTOMATED COUNT: 16 % (ref 11.6–15.1)
GFR SERPL CREATININE-BSD FRML MDRD: 94 ML/MIN/1.73SQ M
GLUCOSE SERPL-MCNC: 107 MG/DL (ref 65–140)
HCT VFR BLD AUTO: 31.7 % (ref 36.5–49.3)
HGB BLD-MCNC: 9.6 G/DL (ref 12–17)
IMM GRANULOCYTES # BLD AUTO: 0.2 THOUSAND/UL (ref 0–0.2)
IMM GRANULOCYTES NFR BLD AUTO: 2 % (ref 0–2)
LYMPHOCYTES # BLD AUTO: 1.16 THOUSANDS/ÂΜL (ref 0.6–4.47)
LYMPHOCYTES NFR BLD AUTO: 13 % (ref 14–44)
MCH RBC QN AUTO: 30.6 PG (ref 26.8–34.3)
MCHC RBC AUTO-ENTMCNC: 30.3 G/DL (ref 31.4–37.4)
MCV RBC AUTO: 101 FL (ref 82–98)
MONOCYTES # BLD AUTO: 0.53 THOUSAND/ÂΜL (ref 0.17–1.22)
MONOCYTES NFR BLD AUTO: 6 % (ref 4–12)
NEUTROPHILS # BLD AUTO: 6.89 THOUSANDS/ÂΜL (ref 1.85–7.62)
NEUTS SEG NFR BLD AUTO: 78 % (ref 43–75)
NRBC BLD AUTO-RTO: 0 /100 WBCS
PLATELET # BLD AUTO: 300 THOUSANDS/UL (ref 149–390)
PMV BLD AUTO: 9.1 FL (ref 8.9–12.7)
POTASSIUM SERPL-SCNC: 3.8 MMOL/L (ref 3.5–5.3)
PROT SERPL-MCNC: 6.5 G/DL (ref 6.4–8.4)
RBC # BLD AUTO: 3.14 MILLION/UL (ref 3.88–5.62)
SODIUM SERPL-SCNC: 143 MMOL/L (ref 135–147)
WBC # BLD AUTO: 8.9 THOUSAND/UL (ref 4.31–10.16)

## 2024-08-07 PROCEDURE — 80053 COMPREHEN METABOLIC PANEL: CPT | Performed by: INTERNAL MEDICINE

## 2024-08-07 PROCEDURE — 85025 COMPLETE CBC W/AUTO DIFF WBC: CPT | Performed by: INTERNAL MEDICINE

## 2024-08-07 NOTE — PROGRESS NOTES
Paras Pitts  presents today for port flush. Pts wife states she's not sure his port should be used as they noticed something wrong with the port on his PET scan. Results noted. Teams message to elaine ferreira rn in regards to whether or not we may use port for labs today. Per Elaine, this is not a new finding and we may use port for lab draw today and chemo tomorrow.

## 2024-08-07 NOTE — PROGRESS NOTES
Paras Pitts  had central labs done and port flushed per protocol.     Paras Pitts is aware of future appt on 8-8-24 at 1230    AVS declined

## 2024-08-08 ENCOUNTER — HOSPITAL ENCOUNTER (OUTPATIENT)
Dept: INFUSION CENTER | Facility: HOSPITAL | Age: 69
End: 2024-08-08
Attending: INTERNAL MEDICINE
Payer: COMMERCIAL

## 2024-08-08 VITALS
WEIGHT: 199.3 LBS | HEART RATE: 92 BPM | HEIGHT: 69 IN | DIASTOLIC BLOOD PRESSURE: 72 MMHG | RESPIRATION RATE: 18 BRPM | TEMPERATURE: 96.9 F | BODY MASS INDEX: 29.52 KG/M2 | OXYGEN SATURATION: 98 % | SYSTOLIC BLOOD PRESSURE: 156 MMHG

## 2024-08-08 DIAGNOSIS — C34.91 CARCINOMA OF RIGHT LUNG (HCC): ICD-10-CM

## 2024-08-08 DIAGNOSIS — C79.9 METASTATIC ADENOCARCINOMA (HCC): Primary | ICD-10-CM

## 2024-08-08 DIAGNOSIS — T45.1X5A CHEMOTHERAPY-INDUCED NEUTROPENIA (HCC): ICD-10-CM

## 2024-08-08 DIAGNOSIS — E83.42 HYPOMAGNESEMIA: ICD-10-CM

## 2024-08-08 DIAGNOSIS — D70.1 CHEMOTHERAPY-INDUCED NEUTROPENIA (HCC): ICD-10-CM

## 2024-08-08 LAB — MAGNESIUM SERPL-MCNC: 1.6 MG/DL (ref 1.9–2.7)

## 2024-08-08 PROCEDURE — 83735 ASSAY OF MAGNESIUM: CPT | Performed by: INTERNAL MEDICINE

## 2024-08-08 PROCEDURE — 96367 TX/PROPH/DG ADDL SEQ IV INF: CPT

## 2024-08-08 PROCEDURE — 96377 APPLICATON ON-BODY INJECTOR: CPT

## 2024-08-08 PROCEDURE — 96413 CHEMO IV INFUSION 1 HR: CPT

## 2024-08-08 RX ORDER — SODIUM CHLORIDE 9 MG/ML
20 INJECTION, SOLUTION INTRAVENOUS ONCE
Status: COMPLETED | OUTPATIENT
Start: 2024-08-08 | End: 2024-08-08

## 2024-08-08 RX ADMIN — SODIUM CHLORIDE 20 ML/HR: 0.9 INJECTION, SOLUTION INTRAVENOUS at 12:34

## 2024-08-08 RX ADMIN — DOCETAXEL 155.2 MG: 20 INJECTION, SOLUTION, CONCENTRATE INTRAVENOUS at 13:23

## 2024-08-08 RX ADMIN — DEXAMETHASONE SODIUM PHOSPHATE: 10 INJECTION, SOLUTION INTRAMUSCULAR; INTRAVENOUS at 12:36

## 2024-08-08 RX ADMIN — PEGFILGRASTIM 6 MG: KIT SUBCUTANEOUS at 14:25

## 2024-08-08 NOTE — PROGRESS NOTES
Paras Pitts  tolerated chemotherapy infusion well with no complications. Neulasta OnPro applied to right arm - full and blinking green upon discharge. Pt aware to remove neulasta device tomorrow (8/9) at 7PM when injection is complete.    Paras Pitts is aware of future appt on 8/28 at 11:30AM.     AVS printed and given to Paras Pitts.

## 2024-08-08 NOTE — QUICK NOTE
Detail Level: Zone Patient seen and examined at bedside resting comfortably. Patient's wife was also at bedside. Patient stated that he was feeling better. He was able to move his L arm and hand against gravity and slight resistance. However, his L leg is still very week. He also has not had a bowel movement for the last few days. We will reassess for BM tomorrow.

## 2024-08-14 DIAGNOSIS — I10 ESSENTIAL HYPERTENSION: ICD-10-CM

## 2024-08-14 DIAGNOSIS — E78.00 HYPERCHOLESTEROLEMIA: ICD-10-CM

## 2024-08-14 DIAGNOSIS — C79.31 METASTASIS TO BRAIN (HCC): ICD-10-CM

## 2024-08-15 ENCOUNTER — OFFICE VISIT (OUTPATIENT)
Dept: HEMATOLOGY ONCOLOGY | Facility: CLINIC | Age: 69
End: 2024-08-15
Payer: COMMERCIAL

## 2024-08-15 ENCOUNTER — TELEPHONE (OUTPATIENT)
Dept: HEMATOLOGY ONCOLOGY | Facility: CLINIC | Age: 69
End: 2024-08-15

## 2024-08-15 VITALS
SYSTOLIC BLOOD PRESSURE: 100 MMHG | WEIGHT: 191 LBS | OXYGEN SATURATION: 100 % | RESPIRATION RATE: 16 BRPM | TEMPERATURE: 97.7 F | BODY MASS INDEX: 28.29 KG/M2 | HEIGHT: 69 IN | DIASTOLIC BLOOD PRESSURE: 60 MMHG | HEART RATE: 93 BPM

## 2024-08-15 DIAGNOSIS — D70.1 CHEMOTHERAPY-INDUCED NEUTROPENIA (HCC): ICD-10-CM

## 2024-08-15 DIAGNOSIS — T45.1X5A CHEMOTHERAPY-INDUCED NEUTROPENIA (HCC): ICD-10-CM

## 2024-08-15 DIAGNOSIS — C34.91 CARCINOMA OF RIGHT LUNG (HCC): ICD-10-CM

## 2024-08-15 DIAGNOSIS — C79.31 METASTASIS TO BRAIN (HCC): ICD-10-CM

## 2024-08-15 DIAGNOSIS — C34.91 CARCINOMA OF RIGHT LUNG (HCC): Primary | ICD-10-CM

## 2024-08-15 DIAGNOSIS — C79.9 METASTATIC ADENOCARCINOMA (HCC): Primary | ICD-10-CM

## 2024-08-15 PROCEDURE — 99215 OFFICE O/P EST HI 40 MIN: CPT | Performed by: INTERNAL MEDICINE

## 2024-08-15 RX ORDER — ATORVASTATIN CALCIUM 40 MG/1
40 TABLET, FILM COATED ORAL
Qty: 30 TABLET | Refills: 0 | Status: SHIPPED | OUTPATIENT
Start: 2024-08-15

## 2024-08-15 RX ORDER — PANTOPRAZOLE SODIUM 40 MG/1
40 TABLET, DELAYED RELEASE ORAL DAILY
Qty: 30 TABLET | Refills: 11 | Status: SHIPPED | OUTPATIENT
Start: 2024-08-15

## 2024-08-15 RX ORDER — AMLODIPINE BESYLATE 10 MG/1
10 TABLET ORAL DAILY
Qty: 30 TABLET | Refills: 2 | Status: SHIPPED | OUTPATIENT
Start: 2024-08-15

## 2024-08-15 RX ORDER — SODIUM CHLORIDE 9 MG/ML
20 INJECTION, SOLUTION INTRAVENOUS ONCE
OUTPATIENT
Start: 2024-08-29

## 2024-08-15 NOTE — PROGRESS NOTES
Hematology/Oncology Outpatient Follow-up  Paras Pitts 69 y.o. male 1955 [id number]    Date:  8/15/2024        Assessment and Plan:  1. Carcinoma of right lung (HCC)  Non-small cell lung cancer favoring adenocarcinoma, clinical stage JAY at diagnosis (cT3, cN1, cM1b) S/P surgical resection of the oligometastatic brain lesion followed by SRS.  Status post 4 cycles of neoadjuvant chemotherapy carboplatin, Alimta and nivolumab 7/2023.    Status post right upper lobe lobectomy on 9/1/2023. The final pathology was ypT2b, pN2, G3.  R0.     Started on adjuvant immunotherapy with nivolumab 480 mg on every 4-week basis on 10/13/2023.  PET scan in March 2024 showed mediastinal adenopathy compatible with recurrence. This was confirmed with bronchoscopy and EBUS guided biopsy on 4/16/2024 which showed metastatic disease in the 4R, 2R lymph node area.     His case was discussed with the radiation oncology team. The decision was made to pursue 4 doses of Avastin to decrease the vasogenic edema in the brain since he was felt to have residual/recurrent disease in the brain according to the MRI from April 2024.   Instead of concurrent chemoradiation the decision was made to get him started on single agent Taxotere.  The patient received a total of 4 cycles of Taxotere followed by a PET scan on 8/5/2024 which showed persistent hypermetabolic right paratracheal mediastinal tri metastatic disease which seems to be stable with mild improvement in size but increased in activity.  The mild splenomegaly and increased diffuse bone marrow activity is most likely reactive.    We discussed sending the patient back to the radiation oncology team to consider radiation to the hypermetabolic area.  We also discussed continuing the Taxotere at the adjusted dose of 60 mg/m² on every 3-week basis after the completion of the radiation since he is having a hard time tolerating the current treatment with significant comorbidities.  His case will  be discussed with Dr. Contreras from the radiation oncology team to finalize his plan.  Javier molecular evaluation will be sent out.    - CBC and differential; Standing  - Comprehensive metabolic panel; Standing  - Magnesium; Standing    2. Metastasis to brain (HCC)  He is status post surgical resection of the oligometastatic Brain lesion followed by postoperative RT with SRS/SRT in 5 fractions to avoid local recurrence.    Brain MRI from April 2024 showed residual or recurrence of his disease.  He did receive 4 doses of Avastin to decrease the enhancement or vasogenic edema..  He will need to follow-up with the radiation oncology team in the near future.    3. Chemotherapy-induced neutropenia (HCC)          HPI:  The patient came today for a follow-up visit accompanied by his wife.  He had PET CT scan after 4 cycles worth of Taxotere on 8/5/2024 which showed:  IMPRESSION:     1. Possible malpositioned right-sided chest port with the tip terminating likely within the distal right internal jugular vein just proximal to the SVC. When the chest port enters the right internal jugular vein it extends slightly superiorly within the   right internal jugular vein before coiling/extending inferiorly towards the chest. This finding appears similar to prior exams and therefore could be expected.  Correlation is recommended to ensure proper positioning of the chest port.     2. Persistent hypermetabolic right paratracheal mediastinal tri metastatic disease which is stable to mildly improved in size but increased in avidity. No new sites of hypermetabolic metastatic disease.     3. New mild diffuse increased FDG activity associated with increasing mild splenomegaly, possibly reactive in nature. However, given increased splenomegaly, correlation is recommended to exclude the possibility of additional pathologic process such as   lymphoproliferative disorder.     4. New diffuse increased marrow/skeletal activity without definitive  focality, therefore favored to be reactive in nature.  Oncology History   Metastatic adenocarcinoma (HCC)   2023 Initial Diagnosis    Metastatic adenocarcinoma (HCC)     3/23/2023 Biopsy    Brain, right frontal mass (biopsy):  - Metastatic non-small cell carcinoma     Comment:  - Tumor cells stain diffusely for CAM5.2, CKAE1/3, CK7, TTF1 and NapsinA with absent CK20, p63, CK5/6, p40 expression.  This immunopanel favors a metastatic lung adenocarcinoma.       5/18/2023 - 7/20/2023 Chemotherapy    cyanocobalamin, 1,000 mcg, Intramuscular, Once, 3 of 3 cycles  Administration: 1,000 mcg (6/29/2023), 1,000 mcg (5/18/2023), 1,000 mcg (7/20/2023)  alteplase (CATHFLO), 2 mg, Intracatheter, Every 1 Minute as needed, 4 of 4 cycles  pegfilgrastim (NEULASTA ONPRO), 6 mg, Subcutaneous, Once, 3 of 3 cycles  Administration: 6 mg (6/8/2023), 6 mg (6/29/2023), 6 mg (7/20/2023)  fosaprepitant (EMEND) IVPB, 150 mg, Intravenous, Once, 4 of 4 cycles  Administration: 150 mg (5/18/2023), 150 mg (6/29/2023), 150 mg (7/20/2023), 150 mg (6/8/2023)  nivolumab (OPDIVO) IVPB, 360 mg (150 % of original dose 240 mg), Intravenous, Once, 4 of 4 cycles  Dose modification: 360 mg (original dose 240 mg, Cycle 1, Reason: Dose modified as per discussion with consulting physician)  Administration: 360 mg (5/18/2023), 360 mg (6/8/2023), 360 mg (6/29/2023), 360 mg (7/20/2023)  CARBOplatin (PARAPLATIN) IVPB (GOG AUC DOSING), 642.5 mg, Intravenous, Once, 4 of 4 cycles  Administration: 642.5 mg (5/18/2023), 642.5 mg (6/29/2023), 566.5 mg (7/20/2023), 650 mg (6/8/2023)  pemetrexed (ALIMTA) chemo infusion, 980 mg, Intravenous, Once, 4 of 4 cycles  Administration: 1,000 mg (5/18/2023), 1,000 mg (6/29/2023), 1,000 mg (7/20/2023), 1,000 mg (6/8/2023)     10/11/2023 - 10/11/2023 Chemotherapy    alteplase (CATHFLO), 2 mg, Intracatheter, Every 1 Minute as needed, 0 of 6 cycles  nivolumab (OPDIVO) IVPB, 240 mg, Intravenous, Once, 0 of 6 cycles     10/13/2023 -  2/23/2024 Chemotherapy    alteplase (CATHFLO), 2 mg, Intracatheter, Every 1 Minute as needed, 5 of 8 cycles  nivolumab (OPDIVO) IVPB, 480 mg, Intravenous, Once, 5 of 8 cycles  Administration: 480 mg (10/13/2023), 480 mg (11/10/2023), 480 mg (12/8/2023), 480 mg (1/5/2024), 480 mg (2/23/2024)     5/15/2024 -  Chemotherapy    alteplase (CATHFLO), 2 mg, Intracatheter, Every 1 Minute as needed, 5 of 10 cycles  pegfilgrastim (NEULASTA), 6 mg, Subcutaneous, Once, 1 of 1 cycle  Administration: 6 mg (6/28/2024)  pegfilgrastim (NEULASTA ONPRO), 6 mg, Subcutaneous, Once, 5 of 10 cycles  Administration: 6 mg (5/15/2024), 6 mg (6/27/2024), 6 mg (6/6/2024), 6 mg (7/18/2024), 6 mg (8/8/2024)  bevacizumab (AVASTIN) IVPB, 5 mg/kg = 457.5 mg (100 % of original dose 5 mg/kg), Intravenous, Once, 3 of 3 cycles  Dose modification: 5 mg/kg (original dose 5 mg/kg, Cycle 1), 5 mg/kg (original dose 5 mg/kg, Cycle 1), 5 mg/kg (original dose 5 mg/kg, Cycle 2)  Administration: 457.5 mg (5/15/2024), 457.5 mg (5/30/2024), 457.5 mg (6/13/2024), 457.5 mg (6/27/2024)  DOCEtaxel (TAXOTERE) chemo infusion, 75 mg/m2 = 155.2 mg, Intravenous, Once, 5 of 10 cycles  Dose modification: 60 mg/m2 (original dose 75 mg/m2, Cycle 6, Reason: Dose Not Tolerated)  Administration: 155.2 mg (5/15/2024), 155.2 mg (6/27/2024), 155.2 mg (6/6/2024), 155.2 mg (7/18/2024), 155.2 mg (8/8/2024)     Carcinoma of right lung (HCC)   4/13/2023 Initial Diagnosis    Carcinoma of right lung (HCC)     4/13/2023 -  Cancer Staged    Staging form: Lung, AJCC 8th Edition  - Clinical: Stage JAY (cT3, cN1, cM1b) - Signed by James Contreras MD on 4/13/2023 4/19/2023 - 4/28/2023 Radiation    Plan ID Energy Fractions Dose per Fraction (cGy) Dose Correction (cGy) Total Dose Delivered (cGy) Elapsed Days   SRT R Frontal 6X-FFF 5 / 5 600 0 3,000 9         5/18/2023 - 7/20/2023 Chemotherapy    cyanocobalamin, 1,000 mcg, Intramuscular, Once, 3 of 3 cycles  Administration: 1,000 mcg  (6/29/2023), 1,000 mcg (5/18/2023), 1,000 mcg (7/20/2023)  alteplase (CATHFLO), 2 mg, Intracatheter, Every 1 Minute as needed, 4 of 4 cycles  pegfilgrastim (NEULASTA ONPRO), 6 mg, Subcutaneous, Once, 3 of 3 cycles  Administration: 6 mg (6/8/2023), 6 mg (6/29/2023), 6 mg (7/20/2023)  fosaprepitant (EMEND) IVPB, 150 mg, Intravenous, Once, 4 of 4 cycles  Administration: 150 mg (5/18/2023), 150 mg (6/29/2023), 150 mg (7/20/2023), 150 mg (6/8/2023)  nivolumab (OPDIVO) IVPB, 360 mg (150 % of original dose 240 mg), Intravenous, Once, 4 of 4 cycles  Dose modification: 360 mg (original dose 240 mg, Cycle 1, Reason: Dose modified as per discussion with consulting physician)  Administration: 360 mg (5/18/2023), 360 mg (6/8/2023), 360 mg (6/29/2023), 360 mg (7/20/2023)  CARBOplatin (PARAPLATIN) IVPB (GOG AUC DOSING), 642.5 mg, Intravenous, Once, 4 of 4 cycles  Administration: 642.5 mg (5/18/2023), 642.5 mg (6/29/2023), 566.5 mg (7/20/2023), 650 mg (6/8/2023)  pemetrexed (ALIMTA) chemo infusion, 980 mg, Intravenous, Once, 4 of 4 cycles  Administration: 1,000 mg (5/18/2023), 1,000 mg (6/29/2023), 1,000 mg (7/20/2023), 1,000 mg (6/8/2023)     9/1/2023 -  Cancer Staged    Staging form: Lung, AJCC 8th Edition  - Pathologic stage from 9/1/2023: Stage JAY (pT2b, pN2, cM1b) - Signed by Treva Bah PA-C on 9/20/2023  Histopathologic type: Adenocarcinoma, NOS  Stage prefix: Initial diagnosis  Histologic grade (G): G3  Histologic grading system: 4 grade system       9/1/2023 Surgery    Right robotic converted to open upper lobectomy      9/1/2023 Biopsy    A.  Lung, right upper lobe:     - Invasive poorly differentiated solid adenocarcinoma of the lung, 4.4 cm.     - Tumor invades into, but not through, the visceral pleura (PL1), confirmed by special VVG stains.     - Lymphatic channel invasion by tumor identified.     - Metastatic carcinoma present in three of thirteen lymph nodes (3/13).       -- Rare fibrotic granulomas present.      - All margins are negative for tumor.     - Emphysematous changes.     B.  Lymph node, level 8:     - Negative for malignancy (0/1).     C.  Lymph node, level 7, #1:     - Negative for malignancy (0/1).     D.  Lymph node, level 4R:     - Negative for malignancy (0/1).     E.  Lymph node, level 4R, #2:     - Fibrovascular adipose tissue; negative for malignancy.     - No lymphoid tissue identified.     F.  Lymph node, level 2R, #1:     - Metastatic carcinoma present in one of three lymph nodes (1/3).     G.  Lymph node, level 2R, #2:     - Negative for malignancy (0/1).     H.  Lymph node, level 11 posterior:     - Single lymph node positive for metastatic carcinoma (1/1).     I.  Lymph node, portion of level 12 on artery:     - Negative for malignancy (0/1).     - Non-caseating granulomatous inflammation present.        -- Special stains for acid fast bacilli and fungal organisms are negative.        10/11/2023 - 10/11/2023 Chemotherapy    alteplase (CATHFLO), 2 mg, Intracatheter, Every 1 Minute as needed, 0 of 6 cycles  nivolumab (OPDIVO) IVPB, 240 mg, Intravenous, Once, 0 of 6 cycles     10/13/2023 - 2/23/2024 Chemotherapy    alteplase (CATHFLO), 2 mg, Intracatheter, Every 1 Minute as needed, 5 of 8 cycles  nivolumab (OPDIVO) IVPB, 480 mg, Intravenous, Once, 5 of 8 cycles  Administration: 480 mg (10/13/2023), 480 mg (11/10/2023), 480 mg (12/8/2023), 480 mg (1/5/2024), 480 mg (2/23/2024)     5/15/2024 -  Chemotherapy    alteplase (CATHFLO), 2 mg, Intracatheter, Every 1 Minute as needed, 5 of 10 cycles  pegfilgrastim (NEULASTA), 6 mg, Subcutaneous, Once, 1 of 1 cycle  Administration: 6 mg (6/28/2024)  pegfilgrastim (NEULASTA ONPRO), 6 mg, Subcutaneous, Once, 5 of 10 cycles  Administration: 6 mg (5/15/2024), 6 mg (6/27/2024), 6 mg (6/6/2024), 6 mg (7/18/2024), 6 mg (8/8/2024)  bevacizumab (AVASTIN) IVPB, 5 mg/kg = 457.5 mg (100 % of original dose 5 mg/kg), Intravenous, Once, 3 of 3 cycles  Dose modification: 5 mg/kg  (original dose 5 mg/kg, Cycle 1), 5 mg/kg (original dose 5 mg/kg, Cycle 1), 5 mg/kg (original dose 5 mg/kg, Cycle 2)  Administration: 457.5 mg (5/15/2024), 457.5 mg (5/30/2024), 457.5 mg (6/13/2024), 457.5 mg (6/27/2024)  DOCEtaxel (TAXOTERE) chemo infusion, 75 mg/m2 = 155.2 mg, Intravenous, Once, 5 of 10 cycles  Dose modification: 60 mg/m2 (original dose 75 mg/m2, Cycle 6, Reason: Dose Not Tolerated)  Administration: 155.2 mg (5/15/2024), 155.2 mg (6/27/2024), 155.2 mg (6/6/2024), 155.2 mg (7/18/2024), 155.2 mg (8/8/2024)         Interval history:    ROS: Review of Systems   Constitutional:  Positive for appetite change and fatigue. Negative for chills and fever.   HENT:  Positive for hearing loss. Negative for ear pain and sore throat.    Eyes:  Negative for pain and visual disturbance.   Respiratory:  Positive for shortness of breath. Negative for cough.    Cardiovascular:  Negative for chest pain and palpitations.   Gastrointestinal:  Positive for nausea. Negative for abdominal pain and vomiting.   Genitourinary:  Negative for dysuria and hematuria.   Musculoskeletal:  Negative for arthralgias and back pain.   Skin:  Negative for color change and rash.   Neurological:  Positive for dizziness and numbness. Negative for seizures and syncope.   All other systems reviewed and are negative.      Past Medical History:   Diagnosis Date    Brain compression (HCC) 03/20/2023    Brain mass 03/20/2023    Cancer (HCC) 2001    Receint lung and brain lesions and prostate cancer in 2001    Cerebral edema (HCC) 03/20/2023    Hypertension     Prostate cancer (HCC) 2001    Rectal bleeding     Stroke (HCC)     2011         Past Surgical History:   Procedure Laterality Date    COLONOSCOPY      CRANIOTOMY Right 3/23/2023    Procedure: Right frontal CRANIOTOMY IMAGE-GUIDED FOR TUMOR;  Surgeon: Clark Carrillo MD;  [facility]: [facility];  Service: Neurosurgery    ENDOBRONCHIAL ULTRASOUND (EBUS) N/A 4/16/2024    Procedure: ENDOBRONCHIAL  ULTRASOUND (EBUS);  Surgeon: Kalia Sparrow MD;  Location: [facility];  Service: Thoracic    IR PORT PLACEMENT  2023    [facility]CELL WASHG SPX N/A 2023    Procedure: BRONCHOSCOPY FLEXIBLE;  Surgeon: Kalia Sparrow MD;  [facility];  Service: Thoracic    [facility] BRNCH[facility] INCL FLUOR GDNCE DX W/CELL WASHG SPX N/A 2024    Procedure: BRONCHOSCOPY FLEXIBLE;  Surgeon: Kalia Sparrow MD;  [facility];  Service: Thoracic    VA CYSTOURETHROSCOPY N/A 3/23/2023    Procedure: EUA, DEL CASTILLO INSERTION;  Surgeon: Ajay Piedra MD;  [facility];  Service: Urology    VA THORACOSCOPY W/LOBECTOMY SINGLE LOBE Right 2023    Procedure: robotic assisted converted to open right upper lobectomy, lymph node dissection;  Surgeon: Kalia Sparrow MD;  [facility];  Service: Thoracic    PROSTATECTOMY  2001    THORACOTOMY Right 2023    Procedure: right thoracotomy with Cryo-Ablation;  Surgeon: Kalia Sparrow MD;  [facility];  Service: Thoracic    TONSILLECTOMY         Social History     Socioeconomic History    Marital status: /Civil Union     Spouse name: None    Number of children: None    Years of education: None    Highest education level: None   Occupational History    None   Tobacco Use    Smoking status: Former     Current packs/day: 0.00     Average packs/day: 1 pack/day for 15.0 years (15.0 ttl pk-yrs)     Types: Cigarettes     Start date:      Quit date:      Years since quittin.6     Passive exposure: Never    Smokeless tobacco: Never    Tobacco comments:     i quit when i was 28. not sure of exact dates   Vaping Use    Vaping status: Never Used   Substance and Sexual Activity    Alcohol use: Not Currently     Alcohol/week: 1.0 standard drink of alcohol     Types: 1 Shots of liquor per week     Comment: socially    Drug use: Yes     Types: Marijuana     Comment: gummies foro nausea with chemo    Sexual activity: Not  Currently   Other Topics Concern    None   Social History Narrative    None     Social Determinants of Health     Financial Resource Strain: Not on file   Food Insecurity: No Food Insecurity (2024)    Hunger Vital Sign     Worried About Running Out of Food in the Last Year: Never true     Ran Out of Food in the Last Year: Never true   Transportation Needs: No Transportation Needs (2024)    PRAPARE - Transportation     Lack of Transportation (Medical): No     Lack of Transportation (Non-Medical): No   Physical Activity: Not on file   Stress: Not on file   Social Connections: Not on file   Intimate Partner Violence: Not on file   Housing Stability: Low Risk  (2024)    Housing Stability Vital Sign     Unable to Pay for Housing in the Last Year: No     Number of Times Moved in the Last Year: 1     Homeless in the Last Year: No       Family History   Problem Relation Age of Onset    Dementia Mother             Breast cancer Sister             Prostate cancer Brother         Received Radiation       No Known Allergies      Current Outpatient Medications:     acetaminophen (TYLENOL) 325 mg tablet, Take 2 tablets (650 mg total) by mouth every 6 (six) hours as needed for mild pain or fever, Disp: , Rfl:     amLODIPine (NORVASC) 10 mg tablet, Take 1 tablet (10 mg total) by mouth daily, Disp: 30 tablet, Rfl: 0    atorvastatin (LIPITOR) 40 mg tablet, Take 1 tablet (40 mg total) by mouth daily after dinner, Disp: 30 tablet, Rfl: 0    dexamethasone (DECADRON) 4 mg tablet, Take 1 tablet (4 mg total) by mouth daily, Disp: 30 tablet, Rfl: 0    dexamethasone (DECADRON) 4 mg tablet, Take 2 tablets (8 mg total) by mouth 2 (two) times a day with meals TAKE 2 TABS (8MG) TWICE A DAY FOR 3 DAYS THE DAY BEFORE CHEMO DAY OF CHEMO AND DAY AFTER CHEMO EVERY 3 WEEKS, Disp: 12 tablet, Rfl: 6    diphenhydramine, lidocaine, Al/Mg hydroxide, simethicone (Magic Mouthwash) SUSP, Swish and spit 10 mL every 4 (four) hours  "as needed for mouth pain or discomfort, Disp: 480 mL, Rfl: 3    ipratropium-albuterol (DUO-NEB) 0.5-2.5 mg/3 mL nebulizer solution, Take 3 mL by nebulization 3 (three) times a day, Disp: 270 mL, Rfl: 0    levETIRAcetam (Keppra) 1000 MG tablet, Take 1 tablet (1,000 mg total) by mouth 2 (two) times a day, Disp: 60 tablet, Rfl: 1    levETIRAcetam (Keppra) 1000 MG tablet, Take 1 tablet (1,000 mg total) by mouth 2 (two) times a day, Disp: 60 tablet, Rfl: 0    loperamide (IMODIUM A-D) 2 MG tablet, Take 2 mg by mouth 4 (four) times a day as needed for diarrhea. Indications: Diarrhea caused by Chemotherapy, Disp: , Rfl:     losartan (COZAAR) 50 mg tablet, Take 1 tablet (50 mg total) by mouth daily, Disp: 30 tablet, Rfl: 0    Oral Wound Care Products (MuGard) LIQD, Apply 5 mL to the mouth or throat 2 (two) times a day Allow to dwellin mouth for at least one min may swallow, Disp: 240 mL, Rfl: 0    pantoprazole (PROTONIX) 40 mg tablet, Take 1 tablet (40 mg total) by mouth daily, Disp: 30 tablet, Rfl: 11    pramipexole (MIRAPEX) 0.25 mg tablet, Take 1 tablet (0.25 mg total) by mouth 3 (three) times a day, Disp: 90 tablet, Rfl: 0    pramipexole (MIRAPEX) 0.25 mg tablet, Take 1 tablet (0.25 mg total) by mouth 3 (three) times a day, Disp: 90 tablet, Rfl: 0    cloBAZam (ONFI) 10 MG tablet, Take 0.5 tablets (5 mg total) by mouth 2 (two) times a day for 10 days, Disp: 10 tablet, Rfl: 0    lacosamide (VIMPAT) 200 mg tablet, Take 1 tablet (200 mg total) by mouth every 12 (twelve) hours for 10 days, Disp: 20 tablet, Rfl: 0    levETIRAcetam (KEPPRA) 1000 MG tablet, Take 2 tablets (2,000 mg total) by mouth every 12 (twelve) hours, Disp: 120 tablet, Rfl: 0      Physical Exam:  /60 (BP Location: Right arm, Patient Position: Sitting, Cuff Size: Adult)   Pulse 93   Temp 97.7 °F (36.5 °C)   Resp 16   Ht 5' 9.02\" (1.753 m)   Wt 86.6 kg (191 lb)   SpO2 100%   BMI 28.19 kg/m²     Physical Exam  Constitutional:       Appearance: He " is well-developed. He is obese. He is ill-appearing.   HENT:      Head: Normocephalic and atraumatic.      Nose: Nose normal.   Eyes:      General: No scleral icterus.        Right eye: No discharge.         Left eye: No discharge.      Conjunctiva/sclera: Conjunctivae normal.      Pupils: Pupils are equal, round, and reactive to light.   Neck:      Thyroid: No thyromegaly.      Trachea: No tracheal deviation.   Cardiovascular:      Rate and Rhythm: Normal rate and regular rhythm.      Heart sounds: Normal heart sounds. No murmur heard.     No friction rub.   Pulmonary:      Effort: Pulmonary effort is normal. No respiratory distress.      Breath sounds: Normal breath sounds. No wheezing or rales.   Chest:      Chest wall: No tenderness.   Abdominal:      General: There is no distension.      Palpations: Abdomen is soft. There is no hepatomegaly or splenomegaly.      Tenderness: There is no abdominal tenderness. There is no guarding or rebound.   Musculoskeletal:         General: No tenderness or deformity.      Cervical back: Normal range of motion and neck supple.      Comments: Ambulatory dysfunction uses the walker.   Lymphadenopathy:      Cervical: No cervical adenopathy.   Skin:     General: Skin is warm and dry.      Coloration: Skin is not pale.      Findings: No erythema or rash.   Neurological:      Mental Status: He is alert and oriented to person, place, and time.      Cranial Nerves: No cranial nerve deficit.      Coordination: Coordination normal.      Deep Tendon Reflexes: Reflexes are normal and symmetric.   Psychiatric:         Behavior: Behavior normal.         Thought Content: Thought content normal.         Judgment: Judgment normal.           Labs:  Lab Results   Component Value Date    WBC 8.90 08/07/2024    HGB 9.6 (L) 08/07/2024    HCT 31.7 (L) 08/07/2024     (H) 08/07/2024     08/07/2024     Lab Results   Component Value Date     05/10/2018    K 3.8 08/07/2024      08/07/2024    CO2 28 08/07/2024    ANIONGAP 11.6 05/10/2018    BUN 6 08/07/2024    CREATININE 0.71 08/07/2024    GLUCOSE 190 (H) 09/01/2023    GLUF 143 (H) 05/20/2024    CALCIUM 8.7 08/07/2024    AST 15 08/07/2024    ALT 9 08/07/2024    ALKPHOS 49 08/07/2024    PROT 7.2 05/10/2018    BILITOT 0.8 05/10/2018    EGFR 94 08/07/2024     Lab Results   Component Value Date    TSH 3.73 09/12/2022       Patient voiced understanding and agreement in the above discussion. Aware to contact our office with questions/symptoms in the interim.

## 2024-08-15 NOTE — TELEPHONE ENCOUNTER
Patient is scheduled 8/29 - please sign appt requests for cycle 7 and on & I will schedule further out.  Thank you!

## 2024-08-21 ENCOUNTER — TELEPHONE (OUTPATIENT)
Dept: NEUROLOGY | Facility: CLINIC | Age: 69
End: 2024-08-21

## 2024-08-21 ENCOUNTER — OFFICE VISIT (OUTPATIENT)
Dept: FAMILY MEDICINE CLINIC | Facility: CLINIC | Age: 69
End: 2024-08-21
Payer: COMMERCIAL

## 2024-08-21 VITALS
BODY MASS INDEX: 28.85 KG/M2 | HEART RATE: 93 BPM | TEMPERATURE: 96.7 F | WEIGHT: 194.8 LBS | SYSTOLIC BLOOD PRESSURE: 116 MMHG | HEIGHT: 69 IN | DIASTOLIC BLOOD PRESSURE: 74 MMHG | OXYGEN SATURATION: 97 %

## 2024-08-21 DIAGNOSIS — R56.9 SEIZURE (HCC): ICD-10-CM

## 2024-08-21 DIAGNOSIS — G81.94 LEFT HEMIPARESIS (HCC): ICD-10-CM

## 2024-08-21 DIAGNOSIS — G40.909 SEIZURE DISORDER (HCC): ICD-10-CM

## 2024-08-21 DIAGNOSIS — R25.1 TREMORS OF NERVOUS SYSTEM: ICD-10-CM

## 2024-08-21 DIAGNOSIS — I10 ESSENTIAL HYPERTENSION: Primary | ICD-10-CM

## 2024-08-21 DIAGNOSIS — R26.2 AMBULATORY DYSFUNCTION: ICD-10-CM

## 2024-08-21 DIAGNOSIS — K21.00 GASTROESOPHAGEAL REFLUX DISEASE WITH ESOPHAGITIS, UNSPECIFIED WHETHER HEMORRHAGE: ICD-10-CM

## 2024-08-21 PROBLEM — K21.9 GERD (GASTROESOPHAGEAL REFLUX DISEASE): Status: ACTIVE | Noted: 2024-08-21

## 2024-08-21 PROCEDURE — 99215 OFFICE O/P EST HI 40 MIN: CPT | Performed by: STUDENT IN AN ORGANIZED HEALTH CARE EDUCATION/TRAINING PROGRAM

## 2024-08-21 RX ORDER — LEVETIRACETAM 1000 MG/1
1000 TABLET ORAL 2 TIMES DAILY
Qty: 60 TABLET | Refills: 1 | Status: SHIPPED | OUTPATIENT
Start: 2024-08-21 | End: 2024-10-20

## 2024-08-21 NOTE — PROGRESS NOTES
Ambulatory Visit  Name: Paras Pitts      : 1955      MRN: [id number]  Encounter Provider: Addy Craven MD  Encounter Date: 2024   Encounter department: [facility]    Assessment & Plan   1. Essential hypertension  Assessment & Plan:  On norvasc and losartan  BP controlled    Orders:  -     Comprehensive metabolic panel; Future  -     Lipid Panel with Direct LDL reflex; Future  -     Hemoglobin A1C; Future  -     TSH, 3rd generation with Free T4 reflex; Future  -     CBC and differential; Future  2. 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 Left hemiparesis (HCC)  Assessment & Plan:  Chronic s/p CVA, brain met  Follows with neuro/surgery/hem-onc  Orders:  -     Ambulatory Referral to Physical Therapy; Future  4. Seizure (HCC)  -     levETIRAcetam (Keppra) 1000 MG tablet; Take 1 tablet (1,000 mg total) by mouth 2 (two) times a day  5. Gastroesophageal reflux disease with esophagitis, unspecified whether hemorrhage  Assessment & Plan:  On protonix  Contineue   6. Tremors of nervous system  Assessment & Plan:  Continue mirapex  Stable .25tid     7. Ambulatory dysfunction  -     Ambulatory Referral to Physical Therapy; Future    Greater than 60 minutes were spent face-to-face with the patient.  This time was used to obtain history, review labs/imaging, review medication as well as to discuss diagnoses.  Time was also spent discussing treatment/management plan directly with the patient       History of Present Illness       HPI    This is 69 yom who presents to the office as a new patient with multiple issues.   His main concerns today is he  seizure disorder  His wife reports that he started with new onset seizure this spring and they are unable to see neuro until November, they are running out of medications and their old PCP did not feel comfortable filling them.. Reports he ran out of meds about a month ago, subsuquently had a recurrent seizure went to the ED and was prescribed one months worth of meds. Unfortuantely patient reports they still are struggling to get in with neuro       Review of Systems   Constitutional:  Negative for activity change, appetite change, chills, fatigue and fever.   HENT:  Negative for congestion, dental problem, drooling, ear discharge, ear pain, facial swelling, postnasal drip, rhinorrhea and sinus pain.    Eyes:  Negative for photophobia, pain, discharge and itching.   Respiratory:  Negative for apnea, cough, chest tightness and shortness of breath.    Cardiovascular:  Negative for chest pain and leg swelling.   Gastrointestinal:  Negative for abdominal distention, abdominal pain, anal bleeding, constipation, diarrhea and nausea.   Endocrine: Negative for cold intolerance, heat intolerance and polydipsia.   Genitourinary:  Negative for difficulty urinating.   Musculoskeletal:  Negative for arthralgias, gait problem, joint swelling and myalgias.   Skin:  Negative for color change and pallor.   Allergic/Immunologic: Negative for immunocompromised state.   Neurological:  Positive for tremors, seizures and weakness. Negative for dizziness, facial asymmetry, light-headedness, numbness and headaches.   Psychiatric/Behavioral:  Negative for agitation, behavioral problems, confusion, decreased concentration and dysphoric mood.    All other systems reviewed and are negative.    Pertinent Medical History         Medical History Reviewed by provider this encounter:  Tobacco  Allergies  Meds  Problems  Med Hx  Surg Hx  Fam Hx       Objective     /74 (BP Location: Left arm, Patient Position: Sitting, Cuff Size: Large)  "  Pulse 93   Temp (!) 96.7 °F (35.9 °C) (Tympanic)   Ht 5' 9.02\" (1.753 m)   Wt 88.4 kg (194 lb 12.8 oz)   SpO2 97%   BMI 28.75 kg/m²     Physical Exam  Constitutional:       Appearance: He is well-developed. He is ill-appearing.   HENT:      Head: Normocephalic.      Right Ear: There is no impacted cerumen.      Left Ear: There is no impacted cerumen.   Eyes:      Pupils: Pupils are equal, round, and reactive to light.   Cardiovascular:      Rate and Rhythm: Normal rate and regular rhythm.   Pulmonary:      Effort: Pulmonary effort is normal.      Breath sounds: Normal breath sounds.   Abdominal:      General: Bowel sounds are normal.      Palpations: Abdomen is soft.   Musculoskeletal:         General: Normal range of motion.      Cervical back: Normal range of motion and neck supple.   Skin:     General: Skin is warm.   Neurological:      Mental Status: He is alert. Mental status is at baseline.      Cranial Nerves: Cranial nerve deficit present.      Sensory: Sensory deficit present.      Motor: No weakness.      Coordination: Coordination normal.      Gait: Gait abnormal.      Deep Tendon Reflexes: Reflexes normal.       Administrative Statements           "

## 2024-08-21 NOTE — TELEPHONE ENCOUNTER
Jacklyn calling back in again to see if management could give a call to Dr Marcos office regarding this matter.     Please assist, Thank you.

## 2024-08-21 NOTE — ASSESSMENT & PLAN NOTE
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

## 2024-08-21 NOTE — TELEPHONE ENCOUNTER
Phone call transferred to [place] office.  Jacklyn from patients PCP Dr Craven's office , called to try to move up HOSPITAL FOLLOW UP LONG with Dr Guillen on 11/15 due to Dr Craven's request. Unable to find an hour slot that is before the 11/15 date.   Jacklyn requested a call back today from one of the management staff. Jacklyn provided [phone] as a good call back number

## 2024-08-22 NOTE — TELEPHONE ENCOUNTER
I spoke with Jacklyn in regards to Eugene's appointment, and I did advise that I would be rescheduling his appt for him to see Dr. Addie Martínez at our [place] office the first week of September.

## 2024-08-25 LAB
CARIS ALK: NEGATIVE
CARIS GENOMIC LOH - EXOME: NORMAL
CARIS HER2/NEU: NEGATIVE
CARIS HLA-A: NORMAL
CARIS HLA-B: NORMAL
CARIS HLA-C: NORMAL
CARIS MSI - EXOME: NORMAL
CARIS PD-L1 (22C3): POSITIVE
CARIS PD-L1 (SP263): POSITIVE
CARIS PD-L1 FDA (28-8): POSITIVE
CARIS PD-L1 FDA(SP142): NEGATIVE
CARIS TMB - EXOME: NORMAL

## 2024-08-27 ENCOUNTER — TELEPHONE (OUTPATIENT)
Dept: HEMATOLOGY ONCOLOGY | Facility: CLINIC | Age: 69
End: 2024-08-27

## 2024-08-27 ENCOUNTER — RADIATION THERAPY TREATMENT (OUTPATIENT)
Dept: RADIATION ONCOLOGY | Facility: CLINIC | Age: 69
End: 2024-08-27
Attending: STUDENT IN AN ORGANIZED HEALTH CARE EDUCATION/TRAINING PROGRAM
Payer: COMMERCIAL

## 2024-08-27 VITALS
BODY MASS INDEX: 29.37 KG/M2 | HEART RATE: 88 BPM | OXYGEN SATURATION: 99 % | TEMPERATURE: 97 F | SYSTOLIC BLOOD PRESSURE: 120 MMHG | RESPIRATION RATE: 16 BRPM | DIASTOLIC BLOOD PRESSURE: 80 MMHG | WEIGHT: 199 LBS

## 2024-08-27 DIAGNOSIS — C79.31 METASTASIS TO BRAIN (HCC): Primary | ICD-10-CM

## 2024-08-27 PROCEDURE — 77334 RADIATION TREATMENT AID(S): CPT | Performed by: STUDENT IN AN ORGANIZED HEALTH CARE EDUCATION/TRAINING PROGRAM

## 2024-08-27 PROCEDURE — 77470 SPECIAL RADIATION TREATMENT: CPT | Performed by: STUDENT IN AN ORGANIZED HEALTH CARE EDUCATION/TRAINING PROGRAM

## 2024-08-27 PROCEDURE — 99215 OFFICE O/P EST HI 40 MIN: CPT | Performed by: STUDENT IN AN ORGANIZED HEALTH CARE EDUCATION/TRAINING PROGRAM

## 2024-08-27 PROCEDURE — G0463 HOSPITAL OUTPT CLINIC VISIT: HCPCS | Performed by: STUDENT IN AN ORGANIZED HEALTH CARE EDUCATION/TRAINING PROGRAM

## 2024-08-27 PROCEDURE — 99211 OFF/OP EST MAY X REQ PHY/QHP: CPT | Performed by: STUDENT IN AN ORGANIZED HEALTH CARE EDUCATION/TRAINING PROGRAM

## 2024-08-27 PROCEDURE — 77263 THER RADIOLOGY TX PLNG CPLX: CPT | Performed by: STUDENT IN AN ORGANIZED HEALTH CARE EDUCATION/TRAINING PROGRAM

## 2024-08-27 NOTE — TELEPHONE ENCOUNTER
Left a message for pt and his wife that chemo is being cancelled this thurs as Rad Onc and Dr howard agreed to hold chemo before Rad Tx starts.  inf was notified spoke to Lanette dewitt.

## 2024-08-27 NOTE — TELEPHONE ENCOUNTER
I called and spoke with the patient; offered appt on 9/4 at 9 am with Dr. Martínez at the [place] office.  Address confirmed with the patient.

## 2024-08-27 NOTE — PROGRESS NOTES
Paras Pitts 1955 is a 69 y.o. male with Stage JAY ([id number]) lung adenocarcinoma s/p resection of a right frontal metastasis. On 4/28/23 he completed a course of postoperative SRT to a dose of 3000cGy in 5 fractions. He thereafter underwent neoadjuvant chemoimmunotherapy (carbo/taxol/nivo from 5/18/23-7/20/23) followed by right thoracotomy with RUL lobectomy and extensive mediastinal LN dissection. He was not recommended for adjuvant RT. He returns today for follow-up.        5/15/24- 11/21/24 DOCEtaxel (Second Line) NSCLC Q21 Days- 10 cycles ordered      7/2/24 CT head wo contrast  Small amount of residual vasogenic edema in the superior right frontal lobe.  Significant decreased from the comparison CT/MRI from April 2024. No local mass effect or midline shift.  Known underlying lesion not well appreciated on this noncontrast CT.   No new lesions identified. No intracranial hemorrhage. No CT evidence of acute infarction.   Left greater than right maxillary sinusitis.      8/5/24 PET CT  1. Possible malpositioned right-sided chest port with the tip terminating likely within the distal right internal jugular vein just proximal to the SVC. When the chest port enters the right internal jugular vein it extends slightly superiorly within the   right internal jugular vein before coiling/extending inferiorly towards the chest. This finding appears similar to prior exams and therefore could be expected.  Correlation is recommended to ensure proper positioning of the chest port.   2. Persistent hypermetabolic right paratracheal mediastinal tri metastatic disease which is stable to mildly improved in size but increased in avidity. No new sites of hypermetabolic metastatic disease.   3. New mild diffuse increased FDG activity associated with increasing mild splenomegaly, possibly reactive in nature. However, given increased splenomegaly, correlation is recommended to exclude the possibility of additional pathologic  process such as   lymphoproliferative disorder.   4. New diffuse increased marrow/skeletal activity without definitive focality, therefore favored to be reactive in nature.   Please see above for details and additional findings.      8/15/24 Dr. Langford  1. Carcinoma of right lung (HCC)  Non-small cell lung cancer favoring adenocarcinoma, clinical stage JAY at diagnosis (cT3, cN1, cM1b) S/P surgical resection of the oligometastatic brain lesion followed by SRS.  Status post 4 cycles of neoadjuvant chemotherapy carboplatin, Alimta and nivolumab 2023.    Status post right upper lobe lobectomy on 2023. The final pathology was ypT2b, pN2, G3.  R0.     Started on adjuvant immunotherapy with nivolumab 480 mg on every 4-week basis on 10/13/2023.  His case was discussed with the radiation oncology team. The decision was made to pursue 4 doses of Avastin to decrease the vasogenic edema in the brain since he was felt to have residual/recurrent disease in the brain according to the MRI from 2024.   Instead of concurrent chemoradiation the decision was made to get him started on single agent Taxotere.  The patient received a total of 4 cycles of Taxotere followed by a PET scan on 2024      [facility] Infusion  10/16/24 Dr. Langford    Follow up visit     Oncology History   Metastatic adenocarcinoma (HCC)    Initial Diagnosis    Metastatic adenocarcinoma (HCC)     3/23/2023 Biopsy    Brain, right frontal mass (biopsy):  - Metastatic non-small cell carcinoma     Comment:  - Tumor cells stain diffusely for CAM5.2, CKAE1/3, CK7, TTF1 and NapsinA with absent CK20, p63, CK5/6, p40 expression.  This immunopanel favors a metastatic lung adenocarcinoma.       2023 - 2023 Chemotherapy    cyanocobalamin, 1,000 mcg, Intramuscular, Once, 3 of 3 cycles  Administration: 1,000 mcg (2023), 1,000 mcg (2023), 1,000 mcg (2023)  alteplase (CATHFLO), 2 mg, Intracatheter, Every 1 Minute as needed, 4 of  4 cycles  pegfilgrastim (NEULASTA ONPRO), 6 mg, Subcutaneous, Once, 3 of 3 cycles  Administration: 6 mg (6/8/2023), 6 mg (6/29/2023), 6 mg (7/20/2023)  fosaprepitant (EMEND) IVPB, 150 mg, Intravenous, Once, 4 of 4 cycles  Administration: 150 mg (5/18/2023), 150 mg (6/29/2023), 150 mg (7/20/2023), 150 mg (6/8/2023)  nivolumab (OPDIVO) IVPB, 360 mg (150 % of original dose 240 mg), Intravenous, Once, 4 of 4 cycles  Dose modification: 360 mg (original dose 240 mg, Cycle 1, Reason: Dose modified as per discussion with consulting physician)  Administration: 360 mg (5/18/2023), 360 mg (6/8/2023), 360 mg (6/29/2023), 360 mg (7/20/2023)  CARBOplatin (PARAPLATIN) IVPB (GO AUC DOSING), 642.5 mg, Intravenous, Once, 4 of 4 cycles  Administration: 642.5 mg (5/18/2023), 642.5 mg (6/29/2023), 566.5 mg (7/20/2023), 650 mg (6/8/2023)  pemetrexed (ALIMTA) chemo infusion, 980 mg, Intravenous, Once, 4 of 4 cycles  Administration: 1,000 mg (5/18/2023), 1,000 mg (6/29/2023), 1,000 mg (7/20/2023), 1,000 mg (6/8/2023)     10/11/2023 - 10/11/2023 Chemotherapy    alteplase (CATHFLO), 2 mg, Intracatheter, Every 1 Minute as needed, 0 of 6 cycles  nivolumab (OPDIVO) IVPB, 240 mg, Intravenous, Once, 0 of 6 cycles     10/13/2023 - 2/23/2024 Chemotherapy    alteplase (CATHFLO), 2 mg, Intracatheter, Every 1 Minute as needed, 5 of 8 cycles  nivolumab (OPDIVO) IVPB, 480 mg, Intravenous, Once, 5 of 8 cycles  Administration: 480 mg (10/13/2023), 480 mg (11/10/2023), 480 mg (12/8/2023), 480 mg (1/5/2024), 480 mg (2/23/2024)     4/16/2024 Biopsy    ENDOBRONCHIAL ULTRASOUND (EBUS)     A-C. Lymph Node, Level 4R (ThinPrep, smear and cell block preparations):    - Metastatic non-small cell carcinoma, most compatible with lung primary; see note.    - Satisfactory for evaluation.     D-F. Lymph Node, Level 2R (ThinPrep, smear and cell block preparations):    - Metastatic non-small cell carcinoma.    - Non-small cell carcinoma, favor adenocarcinoma.    -  Satisfactory for evaluation.     G. Lymph Node, Level 11R:    - Atypical cellular changes seen.    - Rare atypical epithelioid cells in a background of abundant benign bronchial cells.    - Lymphocytes present, compatible with adequate lymph node sampling.     5/15/2024 -  Chemotherapy    alteplase (CATHFLO), 2 mg, Intracatheter, Every 1 Minute as needed, 5 of 10 cycles  pegfilgrastim (NEULASTA), 6 mg, Subcutaneous, Once, 1 of 1 cycle  Administration: 6 mg (6/28/2024)  pegfilgrastim (NEULASTA ONPRO), 6 mg, Subcutaneous, Once, 5 of 10 cycles  Administration: 6 mg (5/15/2024), 6 mg (6/27/2024), 6 mg (6/6/2024), 6 mg (7/18/2024), 6 mg (8/8/2024)  bevacizumab (AVASTIN) IVPB, 5 mg/kg = 457.5 mg (100 % of original dose 5 mg/kg), Intravenous, Once, 3 of 3 cycles  Dose modification: 5 mg/kg (original dose 5 mg/kg, Cycle 1), 5 mg/kg (original dose 5 mg/kg, Cycle 1), 5 mg/kg (original dose 5 mg/kg, Cycle 2)  Administration: 457.5 mg (5/15/2024), 457.5 mg (5/30/2024), 457.5 mg (6/13/2024), 457.5 mg (6/27/2024)  DOCEtaxel (TAXOTERE) chemo infusion, 75 mg/m2 = 155.2 mg, Intravenous, Once, 5 of 10 cycles  Dose modification: 60 mg/m2 (original dose 75 mg/m2, Cycle 6, Reason: Dose Not Tolerated)  Administration: 155.2 mg (5/15/2024), 155.2 mg (6/27/2024), 155.2 mg (6/6/2024), 155.2 mg (7/18/2024), 155.2 mg (8/8/2024)     Carcinoma of right lung (HCC)   4/13/2023 Initial Diagnosis    Carcinoma of right lung (HCC)     4/13/2023 -  Cancer Staged    Staging form: Lung, AJCC 8th Edition  - Clinical: Stage JAY (cT3, cN1, cM1b) - Signed by James Contreras MD on 4/13/2023 4/19/2023 - 4/28/2023 Radiation    Plan ID Energy Fractions Dose per Fraction (cGy) Dose Correction (cGy) Total Dose Delivered (cGy) Elapsed Days   SRT R Frontal 6X-FFF 5 / 5 600 0 3,000 9         5/18/2023 - 7/20/2023 Chemotherapy    cyanocobalamin, 1,000 mcg, Intramuscular, Once, 3 of 3 cycles  Administration: 1,000 mcg (6/29/2023), 1,000 mcg (5/18/2023),  1,000 mcg (7/20/2023)  alteplase (CATHFLO), 2 mg, Intracatheter, Every 1 Minute as needed, 4 of 4 cycles  pegfilgrastim (NEULASTA ONPRO), 6 mg, Subcutaneous, Once, 3 of 3 cycles  Administration: 6 mg (6/8/2023), 6 mg (6/29/2023), 6 mg (7/20/2023)  fosaprepitant (EMEND) IVPB, 150 mg, Intravenous, Once, 4 of 4 cycles  Administration: 150 mg (5/18/2023), 150 mg (6/29/2023), 150 mg (7/20/2023), 150 mg (6/8/2023)  nivolumab (OPDIVO) IVPB, 360 mg (150 % of original dose 240 mg), Intravenous, Once, 4 of 4 cycles  Dose modification: 360 mg (original dose 240 mg, Cycle 1, Reason: Dose modified as per discussion with consulting physician)  Administration: 360 mg (5/18/2023), 360 mg (6/8/2023), 360 mg (6/29/2023), 360 mg (7/20/2023)  CARBOplatin (PARAPLATIN) IVPB (GOG AUC DOSING), 642.5 mg, Intravenous, Once, 4 of 4 cycles  Administration: 642.5 mg (5/18/2023), 642.5 mg (6/29/2023), 566.5 mg (7/20/2023), 650 mg (6/8/2023)  pemetrexed (ALIMTA) chemo infusion, 980 mg, Intravenous, Once, 4 of 4 cycles  Administration: 1,000 mg (5/18/2023), 1,000 mg (6/29/2023), 1,000 mg (7/20/2023), 1,000 mg (6/8/2023)     9/1/2023 -  Cancer Staged    Staging form: Lung, AJCC 8th Edition  - Pathologic stage from 9/1/2023: Stage JAY (pT2b, pN2, cM1b) - Signed by Treva Bah PA-C on 9/20/2023  Histopathologic type: Adenocarcinoma, NOS  Stage prefix: Initial diagnosis  Histologic grade (G): G3  Histologic grading system: 4 grade system       9/1/2023 Surgery    Right robotic converted to open upper lobectomy      9/1/2023 Biopsy    A.  Lung, right upper lobe:     - Invasive poorly differentiated solid adenocarcinoma of the lung, 4.4 cm.     - Tumor invades into, but not through, the visceral pleura (PL1), confirmed by special VVG stains.     - Lymphatic channel invasion by tumor identified.     - Metastatic carcinoma present in three of thirteen lymph nodes (3/13).       -- Rare fibrotic granulomas present.     - All margins are negative for  tumor.     - Emphysematous changes.     B.  Lymph node, level 8:     - Negative for malignancy (0/1).     C.  Lymph node, level 7, #1:     - Negative for malignancy (0/1).     D.  Lymph node, level 4R:     - Negative for malignancy (0/1).     E.  Lymph node, level 4R, #2:     - Fibrovascular adipose tissue; negative for malignancy.     - No lymphoid tissue identified.     F.  Lymph node, level 2R, #1:     - Metastatic carcinoma present in one of three lymph nodes (1/3).     G.  Lymph node, level 2R, #2:     - Negative for malignancy (0/1).     H.  Lymph node, level 11 posterior:     - Single lymph node positive for metastatic carcinoma (1/1).     I.  Lymph node, portion of level 12 on artery:     - Negative for malignancy (0/1).     - Non-caseating granulomatous inflammation present.        -- Special stains for acid fast bacilli and fungal organisms are negative.        10/11/2023 - 10/11/2023 Chemotherapy    alteplase (CATHFLO), 2 mg, Intracatheter, Every 1 Minute as needed, 0 of 6 cycles  nivolumab (OPDIVO) IVPB, 240 mg, Intravenous, Once, 0 of 6 cycles     10/13/2023 - 2/23/2024 Chemotherapy    alteplase (CATHFLO), 2 mg, Intracatheter, Every 1 Minute as needed, 5 of 8 cycles  nivolumab (OPDIVO) IVPB, 480 mg, Intravenous, Once, 5 of 8 cycles  Administration: 480 mg (10/13/2023), 480 mg (11/10/2023), 480 mg (12/8/2023), 480 mg (1/5/2024), 480 mg (2/23/2024)     4/16/2024 Biopsy    ENDOBRONCHIAL ULTRASOUND (EBUS)     A-C. Lymph Node, Level 4R (ThinPrep, smear and cell block preparations):    - Metastatic non-small cell carcinoma, most compatible with lung primary; see note.    - Satisfactory for evaluation.     D-F. Lymph Node, Level 2R (ThinPrep, smear and cell block preparations):    - Metastatic non-small cell carcinoma.    - Non-small cell carcinoma, favor adenocarcinoma.    - Satisfactory for evaluation.     G. Lymph Node, Level 11R:    - Atypical cellular changes seen.    - Rare atypical epithelioid cells in  a background of abundant benign bronchial cells.    - Lymphocytes present, compatible with adequate lymph node sampling.     5/15/2024 -  Chemotherapy    alteplase (CATHFLO), 2 mg, Intracatheter, Every 1 Minute as needed, 5 of 10 cycles  pegfilgrastim (NEULASTA), 6 mg, Subcutaneous, Once, 1 of 1 cycle  Administration: 6 mg (6/28/2024)  pegfilgrastim (NEULASTA ONPRO), 6 mg, Subcutaneous, Once, 5 of 10 cycles  Administration: 6 mg (5/15/2024), 6 mg (6/27/2024), 6 mg (6/6/2024), 6 mg (7/18/2024), 6 mg (8/8/2024)  bevacizumab (AVASTIN) IVPB, 5 mg/kg = 457.5 mg (100 % of original dose 5 mg/kg), Intravenous, Once, 3 of 3 cycles  Dose modification: 5 mg/kg (original dose 5 mg/kg, Cycle 1), 5 mg/kg (original dose 5 mg/kg, Cycle 1), 5 mg/kg (original dose 5 mg/kg, Cycle 2)  Administration: 457.5 mg (5/15/2024), 457.5 mg (5/30/2024), 457.5 mg (6/13/2024), 457.5 mg (6/27/2024)  DOCEtaxel (TAXOTERE) chemo infusion, 75 mg/m2 = 155.2 mg, Intravenous, Once, 5 of 10 cycles  Dose modification: 60 mg/m2 (original dose 75 mg/m2, Cycle 6, Reason: Dose Not Tolerated)  Administration: 155.2 mg (5/15/2024), 155.2 mg (6/27/2024), 155.2 mg (6/6/2024), 155.2 mg (7/18/2024), 155.2 mg (8/8/2024)         Review of Systems:  Review of Systems   Constitutional:  Positive for fatigue.   Respiratory: Negative.     Musculoskeletal:  Positive for gait problem (walks with a walker).   Allergic/Immunologic: Positive for immunocompromised state.   Neurological:  Positive for tremors, seizures (2 weeks ago), weakness (left side) and numbness (left side). Negative for dizziness and headaches.   Psychiatric/Behavioral:  Negative for sleep disturbance.        Clinical Trial: no            Health Maintenance   Topic Date Due    RSV Vaccine Age 60+ Years (1 - 1-dose 60+ series) Never done    COVID-19 Vaccine (3 - Pfizer risk series) 05/14/2021    Fall Risk  04/18/2024    Influenza Vaccine (1) 09/01/2024    BMI: Followup Plan  10/20/2024    Annual Physical   10/20/2024    Depression Screening  01/24/2025    BMI: Adult  08/21/2025    DTaP,Tdap,and Td Vaccines (2 - Td or Tdap) 04/26/2028    Colorectal Cancer Screening  08/10/2031    Hepatitis C Screening  Completed    Zoster Vaccine  Completed    Pneumococcal Vaccine: 65+ Years  Completed    RSV Vaccine age 0-20 Months  Aged Out    HIB Vaccine  Aged Out    IPV Vaccine  Aged Out    Hepatitis A Vaccine  Aged Out    Meningococcal ACWY Vaccine  Aged Out    HPV Vaccine  Aged Out     Patient Active Problem List   Diagnosis    Negative depression screening    Overweight (BMI 25.0-29.9)    Essential hypertension    Annual physical exam    Hypercholesterolemia    Brain mass    Lung nodule    Metastatic adenocarcinoma (HCC)    Carcinoma of right lung (HCC)    Encounter for central line care    Chemotherapy-induced neutropenia (HCC)    History of CVA (cerebrovascular accident)    Generalized weakness    Tremors of nervous system    Seizure-like activity (HCC)    Constipation    Frontal mass of brain    Seizure disorder (HCC)    Left hemiparesis (HCC)    Pseudobulbar affect    Adjustment disorder    Sore throat    Bilateral edema of lower extremity    Cognitive impairment    Pancytopenia (HCC)    Shortness of breath    GERD (gastroesophageal reflux disease)     Past Medical History:   Diagnosis Date    Brain compression (HCC) 03/20/2023    Brain mass 03/20/2023    Cancer (HCC) 2001    Receint lung and brain lesions and prostate cancer in 2001    Cerebral edema (HCC) 03/20/2023    Hypertension     Prostate cancer (HCC) 2001    Rectal bleeding     Stroke (HCC)     2011       Past Surgical History:   Procedure Laterality Date    COLONOSCOPY      CRANIOTOMY Right 3/23/2023    Procedure: Right frontal CRANIOTOMY IMAGE-GUIDED FOR TUMOR;  Surgeon: Clark Carrillo MD;  [facility];  Service: Neurosurgery    ENDOBRONCHIAL ULTRASOUND (EBUS) N/A 4/16/2024    Procedure: ENDOBRONCHIAL ULTRASOUND (EBUS);  Surgeon: Kalia Sparrow MD;   Location: [facility];  Service: Thoracic    IR PORT PLACEMENT  2023    [facility] INCL FLUOR GDNCE DX W/CELL WASHG SPX N/A 2023    Procedure: BRONCHOSCOPY FLEXIBLE;  Surgeon: Kalia Sparrow MD;  [facility]: [facility];  Service: Thoracic    [facility] INCL FLUOR GDNCE DX W/CELL WASHG SPX N/A 2024    Procedure: BRONCHOSCOPY FLEXIBLE;  Surgeon: Kalia Sparrow MD;  [facility];  Service: Thoracic    ME CYSTOURETHROSCOPY N/A 3/23/2023    Procedure: EUA, DEL CASTILLO INSERTION;  Surgeon: Ajay Piedra MD;  [facility];  Service: Urology    ME THORACOSCOPY W/LOBECTOMY SINGLE LOBE Right 2023    Procedure: robotic assisted converted to open right upper lobectomy, lymph node dissection;  Surgeon: Kalia Sparrow MD;  [facility];  Service: Thoracic    PROSTATECTOMY  2001    THORACOTOMY Right 2023    Procedure: right thoracotomy with Cryo-Ablation;  Surgeon: Kalia Sparrow MD;  [facility];  Service: Thoracic    TONSILLECTOMY       Family History   Problem Relation Age of Onset    Dementia Mother             Breast cancer Sister             Prostate cancer Brother         Received Radiation     Social History     Socioeconomic History    Marital status: /Civil Union     Spouse name: Not on file    Number of children: Not on file    Years of education: Not on file    Highest education level: Not on file   Occupational History    Not on file   Tobacco Use    Smoking status: Former     Current packs/day: 0.00     Average packs/day: 1 pack/day for 15.0 years (15.0 ttl pk-yrs)     Types: Cigarettes     Start date:      Quit date:      Years since quittin.6     Passive exposure: Never    Smokeless tobacco: Never    Tobacco comments:     i quit when i was 28. not sure of exact dates   Vaping Use    Vaping status: Never Used   Substance and Sexual Activity    Alcohol use: Not Currently     Alcohol/week: 6.0 standard drinks of alcohol      Types: 6 Cans of beer per week     Comment: socially    Drug use: Not Currently     Types: Marijuana     Comment: gummies foro nausea with chemo    Sexual activity: Yes     Partners: Female     Birth control/protection: None   Other Topics Concern    Not on file   Social History Narrative    Not on file     Social Determinants of Health     Financial Resource Strain: Not on file   Food Insecurity: No Food Insecurity (5/24/2024)    Hunger Vital Sign     Worried About Running Out of Food in the Last Year: Never true     Ran Out of Food in the Last Year: Never true   Transportation Needs: No Transportation Needs (5/24/2024)    PRAPARE - Transportation     Lack of Transportation (Medical): No     Lack of Transportation (Non-Medical): No   Physical Activity: Not on file   Stress: Not on file   Social Connections: Not on file   Intimate Partner Violence: Not on file   Housing Stability: Low Risk  (5/24/2024)    Housing Stability Vital Sign     Unable to Pay for Housing in the Last Year: No     Number of Times Moved in the Last Year: 1     Homeless in the Last Year: No       Current Outpatient Medications:     acetaminophen (TYLENOL) 325 mg tablet, Take 2 tablets (650 mg total) by mouth every 6 (six) hours as needed for mild pain or fever, Disp: , Rfl:     amLODIPine (NORVASC) 10 mg tablet, Take 1 tablet (10 mg total) by mouth daily, Disp: 30 tablet, Rfl: 2    atorvastatin (LIPITOR) 40 mg tablet, Take 1 tablet (40 mg total) by mouth daily after dinner, Disp: 30 tablet, Rfl: 0    diphenhydramine, lidocaine, Al/Mg hydroxide, simethicone (Magic Mouthwash) SUSP, Swish and spit 10 mL every 4 (four) hours as needed for mouth pain or discomfort, Disp: 480 mL, Rfl: 3    ipratropium-albuterol (DUO-NEB) 0.5-2.5 mg/3 mL nebulizer solution, Take 3 mL by nebulization 3 (three) times a day, Disp: 270 mL, Rfl: 0    levETIRAcetam (Keppra) 1000 MG tablet, Take 1 tablet (1,000 mg total) by mouth 2 (two) times a day, Disp: 60 tablet, Rfl:  1    loperamide (IMODIUM A-D) 2 MG tablet, Take 2 mg by mouth 4 (four) times a day as needed for diarrhea. Indications: Diarrhea caused by Chemotherapy, Disp: , Rfl:     losartan (COZAAR) 50 mg tablet, Take 1 tablet (50 mg total) by mouth daily, Disp: 30 tablet, Rfl: 0    Oral Wound Care Products (MuGard) LIQD, Apply 5 mL to the mouth or throat 2 (two) times a day Allow to dwellin mouth for at least one min may swallow, Disp: 240 mL, Rfl: 0    pantoprazole (PROTONIX) 40 mg tablet, Take 1 tablet (40 mg total) by mouth daily, Disp: 30 tablet, Rfl: 11    pramipexole (MIRAPEX) 0.25 mg tablet, Take 1 tablet (0.25 mg total) by mouth 3 (three) times a day, Disp: 90 tablet, Rfl: 0  No Known Allergies  There were no vitals filed for this visit.

## 2024-08-28 ENCOUNTER — EVALUATION (OUTPATIENT)
Dept: PHYSICAL THERAPY | Facility: CLINIC | Age: 69
End: 2024-08-28
Payer: COMMERCIAL

## 2024-08-28 ENCOUNTER — HOSPITAL ENCOUNTER (OUTPATIENT)
Dept: INFUSION CENTER | Facility: HOSPITAL | Age: 69
Discharge: HOME | End: 2024-08-28

## 2024-08-28 DIAGNOSIS — R56.9 SEIZURE (HCC): ICD-10-CM

## 2024-08-28 DIAGNOSIS — R26.2 AMBULATORY DYSFUNCTION: ICD-10-CM

## 2024-08-28 DIAGNOSIS — G81.94 LEFT HEMIPARESIS (HCC): ICD-10-CM

## 2024-08-28 DIAGNOSIS — R25.1 TREMORS OF NERVOUS SYSTEM: ICD-10-CM

## 2024-08-28 PROCEDURE — 97110 THERAPEUTIC EXERCISES: CPT | Performed by: PHYSICAL THERAPIST

## 2024-08-28 PROCEDURE — 97162 PT EVAL MOD COMPLEX 30 MIN: CPT | Performed by: PHYSICAL THERAPIST

## 2024-08-28 NOTE — PROGRESS NOTES
Follow-up - Radiation Oncology   Paras Pitts 1955 69 y.o. male [id number]      History of Present Illness   Cancer Staging   Carcinoma of right lung (HCC)  Staging form: Lung, AJCC 8th Edition  - Clinical: Stage JAY (cT3, cN1, cM1b) - Signed by James Contreras MD on 4/13/2023  - Pathologic stage from 9/1/2023: Stage JAY (pT2b, pN2, cM1b) - Signed by Treva Bah PA-C on 9/20/2023  Histopathologic type: Adenocarcinoma, NOS  Stage prefix: Initial diagnosis  Histologic grade (G): G3  Histologic grading system: 4 grade system      Mr. Paras Pitts is a 69 year old man with Stage JAY ([id number]) lung adenocarcinoma s/p resection of a right frontal metastasis. On 4/28/23 he completed a course of postoperative SRT to a dose of 3000cGy in 5 fractions. He thereafter underwent neoadjuvant chemoimmunotherapy (carbo/taxol/nivo from 5/18/23-7/20/23) followed by right thoracotomy with RUL lobectomy and extensive mediastinal LN dissection. He was found to have N2 disease but was not recommended for adjuvant RT. 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 After extensive discussion he was recommended for combined docetaxel and Avastin for management of his recurrent disease and treatment of his radionecrosis with plan for ultimate mediastinal RT after completion of Avastin and pending restaging. He returns today for follow-up.      Interval History:  The patient was last seen in clinic on 4/22/2024 by telemedicine.  Since then the patient has proceeded with docetaxel (last infusion 8/8/24) and Avastin (last infusion 6/27/24) under the care of Dr. Langford.    CT head (7/2/24) demonstrated:  Small amount of residual vasogenic edema in the superior right frontal lobe.  Significant decreased from the comparison CT/MRI from April 2024. No local mass effect or midline shift. Known underlying lesion not well appreciated on this noncontrast CT.  No new lesions identified.  No intracranial hemorrhage. No CT evidence of acute infarction.    PET/CT (8/5/24) demonstrated:  1. Possible malpositioned right-sided chest port with the tip terminating likely within the distal right internal jugular vein just proximal to the SVC. When the chest port enters the right internal jugular vein it extends slightly superiorly within the right internal jugular vein before coiling/extending inferiorly towards the chest. This finding appears similar to prior exams and therefore could be expected. Correlation is recommended to ensure proper positioning of the chest port.  2. Persistent hypermetabolic right paratracheal mediastinal tri metastatic disease which is stable to mildly improved in size but increased in avidity. No new sites of hypermetabolic metastatic disease.  3. New mild diffuse increased FDG activity associated with increasing mild splenomegaly, possibly reactive in nature. However, given increased splenomegaly, correlation is recommended to exclude the possibility of additional pathologic process such as lymphoproliferative disorder.  4. New diffuse increased marrow/skeletal activity without definitive focality, therefore favored to be reactive in nature.    Currently he is doing well overall.  He did have significant difficulty with chemotherapy and has not been recommended for concurrent treatment with radiation by medical oncology.  He did have some tremors in the left hand approximately 2 weeks ago, which resolved on their own, but notes that this was unusual for his recent history.    Historical Information   Oncology History   Metastatic adenocarcinoma (HCC)   2023 Initial Diagnosis    Metastatic adenocarcinoma (HCC)     3/23/2023 Biopsy    Brain, right frontal mass (biopsy):  - Metastatic non-small cell carcinoma     Comment:  - Tumor cells stain diffusely for CAM5.2, CKAE1/3, CK7, TTF1 and NapsinA with absent CK20, p63, CK5/6, p40 expression.  This immunopanel favors a metastatic  lung adenocarcinoma.       5/18/2023 - 7/20/2023 Chemotherapy    cyanocobalamin, 1,000 mcg, Intramuscular, Once, 3 of 3 cycles  Administration: 1,000 mcg (6/29/2023), 1,000 mcg (5/18/2023), 1,000 mcg (7/20/2023)  alteplase (CATHFLO), 2 mg, Intracatheter, Every 1 Minute as needed, 4 of 4 cycles  pegfilgrastim (NEULASTA ONPRO), 6 mg, Subcutaneous, Once, 3 of 3 cycles  Administration: 6 mg (6/8/2023), 6 mg (6/29/2023), 6 mg (7/20/2023)  fosaprepitant (EMEND) IVPB, 150 mg, Intravenous, Once, 4 of 4 cycles  Administration: 150 mg (5/18/2023), 150 mg (6/29/2023), 150 mg (7/20/2023), 150 mg (6/8/2023)  nivolumab (OPDIVO) IVPB, 360 mg (150 % of original dose 240 mg), Intravenous, Once, 4 of 4 cycles  Dose modification: 360 mg (original dose 240 mg, Cycle 1, Reason: Dose modified as per discussion with consulting physician)  Administration: 360 mg (5/18/2023), 360 mg (6/8/2023), 360 mg (6/29/2023), 360 mg (7/20/2023)  CARBOplatin (PARAPLATIN) IVPB (GO AUC DOSING), 642.5 mg, Intravenous, Once, 4 of 4 cycles  Administration: 642.5 mg (5/18/2023), 642.5 mg (6/29/2023), 566.5 mg (7/20/2023), 650 mg (6/8/2023)  pemetrexed (ALIMTA) chemo infusion, 980 mg, Intravenous, Once, 4 of 4 cycles  Administration: 1,000 mg (5/18/2023), 1,000 mg (6/29/2023), 1,000 mg (7/20/2023), 1,000 mg (6/8/2023)     10/11/2023 - 10/11/2023 Chemotherapy    alteplase (CATHFLO), 2 mg, Intracatheter, Every 1 Minute as needed, 0 of 6 cycles  nivolumab (OPDIVO) IVPB, 240 mg, Intravenous, Once, 0 of 6 cycles     10/13/2023 - 2/23/2024 Chemotherapy    alteplase (CATHFLO), 2 mg, Intracatheter, Every 1 Minute as needed, 5 of 8 cycles  nivolumab (OPDIVO) IVPB, 480 mg, Intravenous, Once, 5 of 8 cycles  Administration: 480 mg (10/13/2023), 480 mg (11/10/2023), 480 mg (12/8/2023), 480 mg (1/5/2024), 480 mg (2/23/2024)     4/16/2024 Biopsy    ENDOBRONCHIAL ULTRASOUND (EBUS)     A-C. Lymph Node, Level 4R (ThinPrep, smear and cell block preparations):    - Metastatic  non-small cell carcinoma, most compatible with lung primary; see note.    - Satisfactory for evaluation.     D-F. Lymph Node, Level 2R (ThinPrep, smear and cell block preparations):    - Metastatic non-small cell carcinoma.    - Non-small cell carcinoma, favor adenocarcinoma.    - Satisfactory for evaluation.     G. Lymph Node, Level 11R:    - Atypical cellular changes seen.    - Rare atypical epithelioid cells in a background of abundant benign bronchial cells.    - Lymphocytes present, compatible with adequate lymph node sampling.     5/15/2024 -  Chemotherapy    alteplase (CATHFLO), 2 mg, Intracatheter, Every 1 Minute as needed, 5 of 10 cycles  pegfilgrastim (NEULASTA), 6 mg, Subcutaneous, Once, 1 of 1 cycle  Administration: 6 mg (6/28/2024)  pegfilgrastim (NEULASTA ONPRO), 6 mg, Subcutaneous, Once, 5 of 10 cycles  Administration: 6 mg (5/15/2024), 6 mg (6/27/2024), 6 mg (6/6/2024), 6 mg (7/18/2024), 6 mg (8/8/2024)  bevacizumab (AVASTIN) IVPB, 5 mg/kg = 457.5 mg (100 % of original dose 5 mg/kg), Intravenous, Once, 3 of 3 cycles  Dose modification: 5 mg/kg (original dose 5 mg/kg, Cycle 1), 5 mg/kg (original dose 5 mg/kg, Cycle 1), 5 mg/kg (original dose 5 mg/kg, Cycle 2)  Administration: 457.5 mg (5/15/2024), 457.5 mg (5/30/2024), 457.5 mg (6/13/2024), 457.5 mg (6/27/2024)  DOCEtaxel (TAXOTERE) chemo infusion, 75 mg/m2 = 155.2 mg, Intravenous, Once, 5 of 10 cycles  Dose modification: 60 mg/m2 (original dose 75 mg/m2, Cycle 6, Reason: Dose Not Tolerated)  Administration: 155.2 mg (5/15/2024), 155.2 mg (6/27/2024), 155.2 mg (6/6/2024), 155.2 mg (7/18/2024), 155.2 mg (8/8/2024)     Carcinoma of right lung (HCC)   4/13/2023 Initial Diagnosis    Carcinoma of right lung (HCC)     4/13/2023 -  Cancer Staged    Staging form: Lung, AJCC 8th Edition  - Clinical: Stage JAY (cT3, cN1, cM1b) - Signed by James Contreras MD on 4/13/2023 4/19/2023 - 4/28/2023 Radiation    Plan ID Energy Fractions Dose per Fraction  (cGy) Dose Correction (cGy) Total Dose Delivered (cGy) Elapsed Days   SRT R Frontal 6X-FFF 5 / 5 600 0 3,000 9         5/18/2023 - 7/20/2023 Chemotherapy    cyanocobalamin, 1,000 mcg, Intramuscular, Once, 3 of 3 cycles  Administration: 1,000 mcg (6/29/2023), 1,000 mcg (5/18/2023), 1,000 mcg (7/20/2023)  alteplase (CATHFLO), 2 mg, Intracatheter, Every 1 Minute as needed, 4 of 4 cycles  pegfilgrastim (NEULASTA ONPRO), 6 mg, Subcutaneous, Once, 3 of 3 cycles  Administration: 6 mg (6/8/2023), 6 mg (6/29/2023), 6 mg (7/20/2023)  fosaprepitant (EMEND) IVPB, 150 mg, Intravenous, Once, 4 of 4 cycles  Administration: 150 mg (5/18/2023), 150 mg (6/29/2023), 150 mg (7/20/2023), 150 mg (6/8/2023)  nivolumab (OPDIVO) IVPB, 360 mg (150 % of original dose 240 mg), Intravenous, Once, 4 of 4 cycles  Dose modification: 360 mg (original dose 240 mg, Cycle 1, Reason: Dose modified as per discussion with consulting physician)  Administration: 360 mg (5/18/2023), 360 mg (6/8/2023), 360 mg (6/29/2023), 360 mg (7/20/2023)  CARBOplatin (PARAPLATIN) IVPB (GOG AUC DOSING), 642.5 mg, Intravenous, Once, 4 of 4 cycles  Administration: 642.5 mg (5/18/2023), 642.5 mg (6/29/2023), 566.5 mg (7/20/2023), 650 mg (6/8/2023)  pemetrexed (ALIMTA) chemo infusion, 980 mg, Intravenous, Once, 4 of 4 cycles  Administration: 1,000 mg (5/18/2023), 1,000 mg (6/29/2023), 1,000 mg (7/20/2023), 1,000 mg (6/8/2023)     9/1/2023 -  Cancer Staged    Staging form: Lung, AJCC 8th Edition  - Pathologic stage from 9/1/2023: Stage JAY (pT2b, pN2, cM1b) - Signed by Treva Bah PA-C on 9/20/2023  Histopathologic type: Adenocarcinoma, NOS  Stage prefix: Initial diagnosis  Histologic grade (G): G3  Histologic grading system: 4 grade system       9/1/2023 Surgery    Right robotic converted to open upper lobectomy      9/1/2023 Biopsy    A.  Lung, right upper lobe:     - Invasive poorly differentiated solid adenocarcinoma of the lung, 4.4 cm.     - Tumor invades into, but not  through, the visceral pleura (PL1), confirmed by special VVG stains.     - Lymphatic channel invasion by tumor identified.     - Metastatic carcinoma present in three of thirteen lymph nodes (3/13).       -- Rare fibrotic granulomas present.     - All margins are negative for tumor.     - Emphysematous changes.     B.  Lymph node, level 8:     - Negative for malignancy (0/1).     C.  Lymph node, level 7, #1:     - Negative for malignancy (0/1).     D.  Lymph node, level 4R:     - Negative for malignancy (0/1).     E.  Lymph node, level 4R, #2:     - Fibrovascular adipose tissue; negative for malignancy.     - No lymphoid tissue identified.     F.  Lymph node, level 2R, #1:     - Metastatic carcinoma present in one of three lymph nodes (1/3).     G.  Lymph node, level 2R, #2:     - Negative for malignancy (0/1).     H.  Lymph node, level 11 posterior:     - Single lymph node positive for metastatic carcinoma (1/1).     I.  Lymph node, portion of level 12 on artery:     - Negative for malignancy (0/1).     - Non-caseating granulomatous inflammation present.        -- Special stains for acid fast bacilli and fungal organisms are negative.        10/11/2023 - 10/11/2023 Chemotherapy    alteplase (CATHFLO), 2 mg, Intracatheter, Every 1 Minute as needed, 0 of 6 cycles  nivolumab (OPDIVO) IVPB, 240 mg, Intravenous, Once, 0 of 6 cycles     10/13/2023 - 2/23/2024 Chemotherapy    alteplase (CATHFLO), 2 mg, Intracatheter, Every 1 Minute as needed, 5 of 8 cycles  nivolumab (OPDIVO) IVPB, 480 mg, Intravenous, Once, 5 of 8 cycles  Administration: 480 mg (10/13/2023), 480 mg (11/10/2023), 480 mg (12/8/2023), 480 mg (1/5/2024), 480 mg (2/23/2024)     4/16/2024 Biopsy    ENDOBRONCHIAL ULTRASOUND (EBUS)     A-C. Lymph Node, Level 4R (ThinPrep, smear and cell block preparations):    - Metastatic non-small cell carcinoma, most compatible with lung primary; see note.    - Satisfactory for evaluation.     D-F. Lymph Node, Level 2R  (ThinPrep, smear and cell block preparations):    - Metastatic non-small cell carcinoma.    - Non-small cell carcinoma, favor adenocarcinoma.    - Satisfactory for evaluation.     G. Lymph Node, Level 11R:    - Atypical cellular changes seen.    - Rare atypical epithelioid cells in a background of abundant benign bronchial cells.    - Lymphocytes present, compatible with adequate lymph node sampling.     5/15/2024 -  Chemotherapy    alteplase (CATHFLO), 2 mg, Intracatheter, Every 1 Minute as needed, 5 of 10 cycles  pegfilgrastim (NEULASTA), 6 mg, Subcutaneous, Once, 1 of 1 cycle  Administration: 6 mg (6/28/2024)  pegfilgrastim (NEULASTA ONPRO), 6 mg, Subcutaneous, Once, 5 of 10 cycles  Administration: 6 mg (5/15/2024), 6 mg (6/27/2024), 6 mg (6/6/2024), 6 mg (7/18/2024), 6 mg (8/8/2024)  bevacizumab (AVASTIN) IVPB, 5 mg/kg = 457.5 mg (100 % of original dose 5 mg/kg), Intravenous, Once, 3 of 3 cycles  Dose modification: 5 mg/kg (original dose 5 mg/kg, Cycle 1), 5 mg/kg (original dose 5 mg/kg, Cycle 1), 5 mg/kg (original dose 5 mg/kg, Cycle 2)  Administration: 457.5 mg (5/15/2024), 457.5 mg (5/30/2024), 457.5 mg (6/13/2024), 457.5 mg (6/27/2024)  DOCEtaxel (TAXOTERE) chemo infusion, 75 mg/m2 = 155.2 mg, Intravenous, Once, 5 of 10 cycles  Dose modification: 60 mg/m2 (original dose 75 mg/m2, Cycle 6, Reason: Dose Not Tolerated)  Administration: 155.2 mg (5/15/2024), 155.2 mg (6/27/2024), 155.2 mg (6/6/2024), 155.2 mg (7/18/2024), 155.2 mg (8/8/2024)       Review of Systems   Constitutional:  Positive for fatigue.   Respiratory: Negative.     Musculoskeletal:  Positive for gait problem (walks with a walker).   Allergic/Immunologic: Positive for immunocompromised state.   Neurological:  Positive for tremors, seizures (2 weeks ago), weakness (left side) and numbness (left side). Negative for dizziness and headaches.   Psychiatric/Behavioral:  Negative for sleep disturbance.         OBJECTIVE:   /80   Pulse 88    Temp (!) 97 °F (36.1 °C)   Resp 16   Wt 90.3 kg (199 lb)   SpO2 99%   BMI 29.37 kg/m²   Pain Assessment:  0  ECOG/Zubrod/WHO: 1 - Symptomatic but completely ambulatory    Physical Exam   Well appearing. NAD.   No increased work of breathing.   Extremities warm and well perfused.   Ambulating with cane.     Assessment/Plan:  Orders Placed This Encounter   Procedures    MRI Brain BT w wo Contrast      We reviewed the patient's recent clinical history including his repeat PET/CT.  On my review, this demonstrates his right paratracheal tri recurrence to be overall stable in size and intensity without new areas of metastatic spread or progression.  His repeat CT head does show substantial improvement in his previously seen intracranial edema, however he has not had a repeat MRI for restaging in some time.    We again discussed the purpose of consolidative radiation to his mediastinal lymph nodes, which at this point remain his only site of radiographically identified disease.  We discussed the risks benefits and alternatives to this approach in detail, noting that such treatment would be considered a relatively aggressive approach.  Nonetheless, as he is only ever had 1 site of metastatic disease spread, which has been effectively managed, I do feel it is reasonable in an attempt to provide long-term disease control.    Risks of mediastinal RT were reviewed in detail to include cough, fatigue, radiation dermatitis, esophagitis, risk for pneumonitis, and vascular injury (given prior history of bevacizumab).  After consideration, the patient would like to proceed with RT as described.  I will plan to treat his mediastinal lymph nodes to a dose of 60 Gray in 30 fractions using IMRT technique.  I will obtain a repeat MRI brain prior to starting in order to assure there are no new sites of metastatic disease elsewhere.    James Contreras MD  8/28/2024,9:01 AM    Total time spent reviewing EMR, seeing patient in  "clinic visit, documenting visit, placing treatment orders, and communicating with other medical providers on date of visit: 55 minutes.     Portions of the record may have been created with voice recognition software.  Occasional wrong word or \"sound a like\" substitutions may have occurred due to the inherent limitations of voice recognition software.  Read the chart carefully and recognize, using context, where substitutions have occurred.        "

## 2024-08-28 NOTE — PROGRESS NOTES
PT Evaluation     Today's date: 2024  Patient name: Paras Pitts  : 1955  MRN: [id number]  Referring provider: Addy Craven MD  Dx:   Encounter Diagnosis     ICD-10-CM    1. Left hemiparesis (HCC)  G81.94 Ambulatory Referral to Physical Therapy      2. Ambulatory dysfunction  R26.2 Ambulatory Referral to Physical Therapy                     Assessment  Impairments: abnormal coordination, abnormal muscle firing, abnormal or restricted ROM, activity intolerance, impaired physical strength, lacks appropriate home exercise program, pain with function and safety issue  Functional limitations: Difficulty with amb, stair negotiation, decreased activity tolerance  Symptom irritability: moderate    Assessment details: Paras Pitts is a 69 y.o. male who presents to outpatient PT with a  Left hemiparesis (HCC)  Ambulatory dysfunction.  No further referral appears necessary at this time based upon examination results. Pt presents with decreased strength, ROM, balance, functional activity tolerance, and pain with movement in his Left UE and LE  which is  limiting his ability to perform the aforementioned functional activities.  Etiologic factors include repetitive poor body mechanics. Prognosis is good given HEP compliance and PT 2-3/wk.  Please contact me if you have any questions or recommendations.  Thank you for the opportunity to share in  Paras care.     Understanding of Dx/Px/POC: good     Prognosis: good    Goals  STG to be achieved in 4 weeks     1. Pt will reduce TUG score to 13 seconds indicating reduced risk for falls   2. Pt will improve 5x sit to stand scores by 5 seconds indicating improved LE strength   3. Pt will improve 6 MWT score to 400 feet indicating improved endurance      LTG to be achieved in 6-8 weeks  1. Pt will be complaint with HEP   2. Pt will improve ROM to WFL, to help facilitate independence with ADL's, IADL's, and functional activities   3. Pt will improve Strength to WFL to help  facilitate independence with ADL's, IADL's, and functional activities   4. Pt will have no limitations with ambulation to help facilitate independence at home and in the community.   5. Pt will have no limitations with stair negotiation to help facilitate independence at home and in the community           Plan  Patient would benefit from: skilled physical therapy    Planned therapy interventions: ADL training, balance, balance/weight bearing training, flexibility, functional ROM exercises, gait training, graded activity, graded exercise, graded motor, home exercise program, joint mobilization, manual therapy, neuromuscular re-education, patient education, postural training, strengthening, stretching, therapeutic activities and therapeutic exercise    Frequency: 2x week  Duration in weeks: 12  Plan of Care beginning date: 2024  Plan of Care expiration date: 2024  Treatment plan discussed with: patient, PTA and referring physician        Subjective Evaluation    History of Present Illness  Mechanism of injury: The patient is a 69 year old male who presents to outpatient PT with chief complaint of generalized weakness weakness. Patient has significant hx of lung CA with mets to the brain. Also hx of stroke with left sided peter paresis. Overall he feels his mobility it ok. He uses RW  for ambulation. He endorses 1 fall in the past 6 months. He recently stopped chemotherapy, ans reports that he will be starting radiation treatments. Goals for PT are to get back to normal    Patient Goals  Patient goals for therapy: increased strength, decreased pain and increased motion  Patient goal: to get back to normal  Pain  Current pain ratin  At best pain ratin  At worst pain ratin  Quality: dull ache        Objective     Active Range of Motion   Left Shoulder   Normal active range of motion    Right Shoulder   Normal active range of motion    Left Elbow   Normal active range of motion    Left Wrist    Normal active range of motion  Left Hip   Normal active range of motion  Left Knee   Normal active range of motion    Strength/Myotome Testing     Left Shoulder     Planes of Motion   Flexion: 3+   Abduction: 3+   Adduction: 3+     Right Shoulder     Planes of Motion   Flexion: 4-   Abduction: 4-   Adduction: 4-     Left Elbow   Flexion: 3+  Forearm supination: 3+    Right Elbow   Flexion: 4-  Forearm supination: 4-    Left Wrist/Hand   Wrist extension: 3+  Wrist flexion: 3+    Right Wrist/Hand   Wrist extension: 4-  Wrist flexion: 4-    Left Hip   Planes of Motion   Flexion: 3+  Abduction: 3+  Adduction: 3+    Right Hip   Planes of Motion   Flexion: 4-  Abduction: 4-  Adduction: 4-    Left Knee   Flexion: 3+  Extension: 3+    Right Knee   Flexion: 4-  Extension: 4-    TUG 16 sec RW   5x STS 24 seconds   6MWT   303 feet RW            Precautions: Active CA, Chemo/radiation hx of stroke       Manuals                                                                 Neuro Re-Ed             Hip add             Hip abd              LAQ              March                                                     Ther Ex             HR TR              Mini Squat              Step Up              Three way              Nu step   L3 x 10 min                                                    Ther Activity                                       Gait Training                                       Modalities

## 2024-08-29 ENCOUNTER — OFFICE VISIT (OUTPATIENT)
Dept: PHYSICAL THERAPY | Facility: CLINIC | Age: 69
End: 2024-08-29
Payer: COMMERCIAL

## 2024-08-29 ENCOUNTER — TELEPHONE (OUTPATIENT)
Age: 69
End: 2024-08-29

## 2024-08-29 ENCOUNTER — HOSPITAL ENCOUNTER (OUTPATIENT)
Dept: INFUSION CENTER | Facility: HOSPITAL | Age: 69
Discharge: HOME | End: 2024-08-29
Attending: INTERNAL MEDICINE

## 2024-08-29 DIAGNOSIS — G81.94 LEFT HEMIPARESIS (HCC): Primary | ICD-10-CM

## 2024-08-29 DIAGNOSIS — R26.2 AMBULATORY DYSFUNCTION: ICD-10-CM

## 2024-08-29 PROCEDURE — 77293 RESPIRATOR MOTION MGMT SIMUL: CPT | Performed by: STUDENT IN AN ORGANIZED HEALTH CARE EDUCATION/TRAINING PROGRAM

## 2024-08-29 PROCEDURE — 77338 DESIGN MLC DEVICE FOR IMRT: CPT | Performed by: STUDENT IN AN ORGANIZED HEALTH CARE EDUCATION/TRAINING PROGRAM

## 2024-08-29 PROCEDURE — 77300 RADIATION THERAPY DOSE PLAN: CPT | Performed by: STUDENT IN AN ORGANIZED HEALTH CARE EDUCATION/TRAINING PROGRAM

## 2024-08-29 PROCEDURE — 97110 THERAPEUTIC EXERCISES: CPT | Performed by: PHYSICAL THERAPIST

## 2024-08-29 PROCEDURE — 77301 RADIOTHERAPY DOSE PLAN IMRT: CPT | Performed by: STUDENT IN AN ORGANIZED HEALTH CARE EDUCATION/TRAINING PROGRAM

## 2024-08-29 PROCEDURE — 97112 NEUROMUSCULAR REEDUCATION: CPT | Performed by: PHYSICAL THERAPIST

## 2024-08-29 RX ORDER — LEVETIRACETAM 1000 MG/1
1000 TABLET ORAL 2 TIMES DAILY
Qty: 60 TABLET | Refills: 0 | OUTPATIENT
Start: 2024-08-29 | End: 2024-10-28

## 2024-08-29 RX ORDER — PRAMIPEXOLE DIHYDROCHLORIDE 0.25 MG/1
0.25 TABLET ORAL 3 TIMES DAILY
Qty: 90 TABLET | Refills: 0 | Status: SHIPPED | OUTPATIENT
Start: 2024-08-29

## 2024-08-29 NOTE — PROGRESS NOTES
Daily Note     Today's date: 2024  Patient name: Paras Pitts  : 1955  MRN: [id number]  Referring provider: Addy Craven MD  Dx:   Encounter Diagnosis     ICD-10-CM    1. Left hemiparesis (HCC)  G81.94       2. Ambulatory dysfunction  R26.2                      Subjective: Patient reports feeling LE muscle soreness due to exercise during initial evaluation.     Objective: See treatment diary below      Assessment: Tolerated treatment well. Patient demonstrated fatigue post treatment and would benefit from continued PT  Patient responded well to LE strengthening exercises.  Required minimal verbal queuing to correct forward trunk lean during  and three ways.  Experienced pain in groin with full weight bearing on left side during standing marches. Patient was able to complete exercise and continue treatment.   Received education on delayed onset muscle soreness expected after treatment session.   Patient would benefit from continued skilled physical therapy services to increase LLE strength to improve independence and safety in performing ADL and reduce falls risk.   Pt treated by jose j MELLO under direct supervision of Abhijeet Bhatt DPT   Plan: Continue per plan of care.      Precautions: Active CA, Chemo/radiation hx of stroke       Manuals                                                                 Neuro Re-Ed             Hip add 20x            Hip abd  20x blue            LAQ  20x 2lbs            March  20x  2lbs                                                   Ther Ex             HR TR  20x            Mini Squat  10x            Step Up  20x  2lbs  Step A            Three way  20x  2lbs            Nu step   L3 x 10 min                                                    Ther Activity                                       Gait Training                                       Modalities

## 2024-08-29 NOTE — TELEPHONE ENCOUNTER
Allison from [employer] calling with prior auth info for Physical therapy   At [facility]   Approved for 11 visits  8/28/24 to 11/25/24  auth [id number]

## 2024-09-03 ENCOUNTER — OFFICE VISIT (OUTPATIENT)
Dept: PHYSICAL THERAPY | Facility: CLINIC | Age: 69
End: 2024-09-03
Payer: COMMERCIAL

## 2024-09-03 DIAGNOSIS — G81.94 LEFT HEMIPARESIS (HCC): Primary | ICD-10-CM

## 2024-09-03 DIAGNOSIS — R26.2 AMBULATORY DYSFUNCTION: ICD-10-CM

## 2024-09-03 PROCEDURE — 97116 GAIT TRAINING THERAPY: CPT | Performed by: PHYSICAL THERAPIST

## 2024-09-03 PROCEDURE — 97110 THERAPEUTIC EXERCISES: CPT | Performed by: PHYSICAL THERAPIST

## 2024-09-03 PROCEDURE — 97112 NEUROMUSCULAR REEDUCATION: CPT | Performed by: PHYSICAL THERAPIST

## 2024-09-03 NOTE — PROGRESS NOTES
Daily Note     Today's date: 9/3/2024  Patient name: Paras Pitts  : 1955  MRN: [id number]  Referring provider: Addy Craven MD  Dx:   Encounter Diagnosis     ICD-10-CM    1. Left hemiparesis (HCC)  G81.94       2. Ambulatory dysfunction  R26.2                      Subjective: Patient reports feeling good and having some muscle soreness since last treatment session. Reports less pain and discomfort with left hip injury form fall sustained prior to the start of physical therapy.      Objective: See treatment diary below      Assessment: Tolerated treatment well. Patient demonstrated fatigue post treatment and would benefit from continued PT  Responded well to treatment. Demonstrated proper exercise technique and required no verbal cueing. Patient would benefit from continued skilled physical therapy to increase ambulatory endurance and LE strength to improve safety and ability in performing ADL.    Treated by jose j MELLO under direct supervision of Abhijeet Bhatt DPT   Plan: Continue per plan of care.      Precautions: Active CA, Chemo/radiation hx of stroke       Manuals 8/29 9/3                                                               Neuro Re-Ed             Hip add 20x 20x           Hip abd  20x blue 20x blue           LAQ  20x 2lbs 20x  2lbs           March  20x  2lbs 20x  2lbs                                                  Ther Ex             HR TR  20x 20x           Mini Squat  10x 20x           Step Up  20x  2lbs  Step A 20x  2lbs  Step A           Three way  20x  2lbs 20x  2lbs           Nu step   L3 x 10 min  L3 x  10 min                                                  Ther Activity                                       Gait Training               3 laps                        Modalities

## 2024-09-04 ENCOUNTER — OFFICE VISIT (OUTPATIENT)
Dept: NEUROLOGY | Facility: CLINIC | Age: 69
End: 2024-09-04
Payer: COMMERCIAL

## 2024-09-04 ENCOUNTER — HOSPITAL ENCOUNTER (OUTPATIENT)
Dept: MRI IMAGING | Facility: HOSPITAL | Age: 69
Discharge: HOME | End: 2024-09-04
Payer: COMMERCIAL

## 2024-09-04 VITALS
HEIGHT: 69 IN | SYSTOLIC BLOOD PRESSURE: 120 MMHG | OXYGEN SATURATION: 94 % | DIASTOLIC BLOOD PRESSURE: 74 MMHG | WEIGHT: 190 LBS | HEART RATE: 89 BPM | BODY MASS INDEX: 28.14 KG/M2

## 2024-09-04 DIAGNOSIS — C79.9 METASTATIC ADENOCARCINOMA (HCC): ICD-10-CM

## 2024-09-04 DIAGNOSIS — C79.31 METASTASIS TO BRAIN (HCC): ICD-10-CM

## 2024-09-04 DIAGNOSIS — G40.109 FOCAL EPILEPSY ORIGINATING IN FRONTAL LOBE (HCC): Primary | ICD-10-CM

## 2024-09-04 PROCEDURE — 1160F RVW MEDS BY RX/DR IN RCRD: CPT | Performed by: STUDENT IN AN ORGANIZED HEALTH CARE EDUCATION/TRAINING PROGRAM

## 2024-09-04 PROCEDURE — 99215 OFFICE O/P EST HI 40 MIN: CPT | Performed by: STUDENT IN AN ORGANIZED HEALTH CARE EDUCATION/TRAINING PROGRAM

## 2024-09-04 PROCEDURE — 70553 MRI BRAIN STEM W/O & W/DYE: CPT

## 2024-09-04 PROCEDURE — 1159F MED LIST DOCD IN RCRD: CPT | Performed by: STUDENT IN AN ORGANIZED HEALTH CARE EDUCATION/TRAINING PROGRAM

## 2024-09-04 PROCEDURE — A9585 GADOBUTROL INJECTION: HCPCS | Performed by: STUDENT IN AN ORGANIZED HEALTH CARE EDUCATION/TRAINING PROGRAM

## 2024-09-04 RX ORDER — GADOBUTROL 604.72 MG/ML
10 INJECTION INTRAVENOUS
Status: COMPLETED | OUTPATIENT
Start: 2024-09-04 | End: 2024-09-04

## 2024-09-04 RX ORDER — CLONAZEPAM 1 MG/1
1 TABLET ORAL AS NEEDED
Qty: 5 TABLET | Refills: 0 | Status: SHIPPED | OUTPATIENT
Start: 2024-09-04 | End: 2024-10-04

## 2024-09-04 RX ORDER — LACOSAMIDE 200 MG/1
200 TABLET ORAL EVERY 12 HOURS SCHEDULED
Qty: 180 TABLET | Refills: 3 | Status: SHIPPED | OUTPATIENT
Start: 2024-09-04 | End: 2025-08-30

## 2024-09-04 RX ORDER — LEVETIRACETAM 1000 MG/1
2000 TABLET ORAL 2 TIMES DAILY
Qty: 360 TABLET | Refills: 3 | Status: SHIPPED | OUTPATIENT
Start: 2024-09-04 | End: 2025-08-30

## 2024-09-04 RX ADMIN — GADOBUTROL 10 ML: 604.72 INJECTION INTRAVENOUS at 17:45

## 2024-09-04 NOTE — PROGRESS NOTES
Epilepsy Ambulatory Visit  Name: Paras Pitts       : 1955       MRN: [id number]   Encounter Provider: Addie Martínez MD   Encounter Date: 2024  Encounter department: [facility]    ASSESSMENT & PLAN  Paras Pitts is a right-handed 69 y.o. male with PMH lung adenocarcinoma with right frontal metastasis s/p resection who presents as post-hospital follow-up for Epilepsy. He was recently admitted in April with focal status epilepticus of the left hemibody and started on 3 AEDs. Since then, he has unfortunately run out of 2 of his medications and had 2 breakthrough seizures.     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     RTC 3 mo       HISTORY OF PRESENT ILLNESS  Paras Pitts is a right-handed 69 y.o. male with lung adenocarcinoma with right frontal metastasis s/p resection who presents as initial consultation for seizures.     Per my chart review: He was admitted to the hospital between -24 with acute onset tremors/shaking of the left upper and lower extremity. EEG showed continuous right central discharges correlating to left leg jerks, concerning for focal status epilepticus. This resolved with adjustment of AEDs. He was discharged on Keppra 2g BID, Onfi 5mg BID, and Vimpat 200 BID.     Per patient:   He presents today with his wife Jaclyn. He first noticed weakness in his left hemibody and  discovered his lung cancer and brain metastasis. He had brain and lung resections about a year ago, and started chemotherapy/ immunotherapy and radiation. In January of this year, he had immunotherapy, and began to have worsening left sided weakness again. He was diagnosed with radiation necrosis and started steroids at that time.     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 His head and face weren't involved. A customer walked in and called the ambulance because he was having difficulty breathing. The shaking may have been over 30 minutes. It would come and go. He never lost consciousness or awareness.     In the hospital, his seizures resolved with AEDs and steroids.     Since discharge he has had two more seizures. The first in July was when he ran out of his medication and could not find anyone to fill it. Then he had a seizure a month ago while he was on Keppra alone. His left arm started shaking, about 5 minutes, but never progressed to the rest of his body. His wife was able to calm him down and stroke his arm which she thinks helped.    He feels very tired after he takes his medication, but it is tolerable. He is starting radiation to his lung tomorrow and getting a repeat MRI this afternoon. He may start chemotherapy after radiation.     He incidentally has a history of stroke many years ago causing numbness to the left side.       Epilepsy Risk Factors: Patient is a product of uncomplicated pregnancy, full term spontaneous vaginal delivery, met appropriate development milestones. No learning disabilities or cognitive delay. No h/o febrile seizures. Stroke and CNS tumor as above.  There is no family h/o of seizures or epilepsy.    Prior Work-up:   MRI Brain (4/21/24): Personally reviewed by me, and shows: Right superior frontal enhancing lesion with  surrounding vasogenic edema, close to motor strip.   IMPRESSION: Right superior frontal mass resection and chemoradiation with unchanged linear enhancement along the surgical cavity compared to 3/26/2024 favoring radiation necrosis. Residual/recurrent disease is felt to be less likely. Stable surrounding hyperintense. T2/FLAIR signal likely related to a combination of posttreatment changes and resolving vasogenic edema. There is no new suspicious intra-axial lesion.  VEEGs: 4/22-4/23/24  Summary of monitoring: This concludes 37 hours of continuous video EEG monitoring from 4/22/2024 22:27 through 4/24/2024 1133. Early in recording there was frequent or nearly continuous focal motor seizure (periodic central midline discharges consistently correlating with left leg jerks). Focal motor seizure stopped near 4/22 at 10:00 (periodic discharges remained unchanged, but had no clinical correlate). There were two subclinical right fronto central electrographic seizures (4/23 at 13:12, 19:21).   Labs: 7/17/24- CBC with anemia, CMP with low K and protein    Past Medical History:    Past Medical History:   Diagnosis Date    Brain compression (HCC) 03/20/2023    Brain mass 03/20/2023    Cancer (HCC) 2001    Receint lung and brain lesions and prostate cancer in 2001    Cerebral edema (HCC) 03/20/2023    Hypertension     Lung cancer (HCC)     Prostate cancer (HCC) 2001    Rectal bleeding     Stroke (HCC)     2011       Past Surgical History:   Procedure Laterality Date    COLONOSCOPY      CRANIOTOMY Right 3/23/2023    Procedure: Right frontal CRANIOTOMY IMAGE-GUIDED FOR TUMOR;  Surgeon: Clark Carrillo MD;  [facility];  Service: Neurosurgery    ENDOBRONCHIAL ULTRASOUND (EBUS) N/A 4/16/2024    Procedure: ENDOBRONCHIAL ULTRASOUND (EBUS);  Surgeon: Kalia Sparrow MD;  [facility];  Service: Thoracic    IR PORT PLACEMENT  4/27/2023    [facility] INCL FLUOR GDNCE DX W/CELL WASHG SPX N/A 9/1/2023    Procedure:  BRONCHOSCOPY FLEXIBLE;  Surgeon: Kalia Sparrow MD;  [facility];  Service: Thoracic    [facility] INCL FLUOR GDNCE DX W/CELL WASHG SPX N/A 2024    Procedure: BRONCHOSCOPY FLEXIBLE;  Surgeon: Kalia Sparrow MD;  [facility];  Service: Thoracic    TN CYSTOURETHROSCOPY N/A 3/23/2023    Procedure: EUA, DEL CASTILLO INSERTION;  Surgeon: Ajay Piedra MD;  [facility];  Service: Urology    TN THORACOSCOPY W/LOBECTOMY SINGLE LOBE Right 2023    Procedure: robotic assisted converted to open right upper lobectomy, lymph node dissection;  Surgeon: Kalia Sparrow MD;  [facility];  Service: Thoracic    PROSTATECTOMY  2001    THORACOTOMY Right 2023    Procedure: right thoracotomy with Cryo-Ablation;  Surgeon: Kalia Sparrow MD;  [facility];  Service: Thoracic    TONSILLECTOMY         Family History:  Family History   Problem Relation Age of Onset    Dementia Mother             Breast cancer Sister             Prostate cancer Brother         Received Radiation     There is no family history of epilepsy.     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 [employer]   Driving: No     Allergies:  No Known Allergies    Medications:    Current Outpatient Medications:     acetaminophen (TYLENOL) 325 mg tablet, Take 2 tablets (650 mg total) by mouth every 6 (six) hours as needed for mild pain or fever, Disp: , Rfl:     amLODIPine (NORVASC) 10 mg tablet, Take 1 tablet (10 mg total) by mouth daily, Disp: 30 tablet, Rfl: 2    atorvastatin (LIPITOR) 40 mg tablet, Take 1 tablet (40 mg total) by mouth daily after dinner, Disp: 30 tablet, Rfl: 0    diphenhydramine, lidocaine, Al/Mg hydroxide, simethicone (Magic Mouthwash) SUSP, Swish and spit 10 mL every 4 (four) hours as needed for mouth pain or discomfort, Disp: 480 mL, Rfl:  "3    ipratropium-albuterol (DUO-NEB) 0.5-2.5 mg/3 mL nebulizer solution, Take 3 mL by nebulization 3 (three) times a day (Patient taking differently: Take 3 mL by nebulization if needed), Disp: 270 mL, Rfl: 0    levETIRAcetam (Keppra) 1000 MG tablet, Take 1 tablet (1,000 mg total) by mouth 2 (two) times a day, Disp: 60 tablet, Rfl: 1    loperamide (IMODIUM A-D) 2 MG tablet, Take 2 mg by mouth 4 (four) times a day as needed for diarrhea. Indications: Diarrhea caused by Chemotherapy, Disp: , Rfl:     losartan (COZAAR) 50 mg tablet, Take 1 tablet (50 mg total) by mouth daily, Disp: 30 tablet, Rfl: 0    Oral Wound Care Products (MuGard) LIQD, Apply 5 mL to the mouth or throat 2 (two) times a day Allow to dwellin mouth for at least one min may swallow, Disp: 240 mL, Rfl: 0    pantoprazole (PROTONIX) 40 mg tablet, Take 1 tablet (40 mg total) by mouth daily, Disp: 30 tablet, Rfl: 11    pramipexole (MIRAPEX) 0.25 mg tablet, Take 1 tablet (0.25 mg total) by mouth 3 (three) times a day, Disp: 90 tablet, Rfl: 0      OBJECTIVE  /74 (BP Location: Left arm, Patient Position: Sitting, Cuff Size: Large)   Pulse 89   Ht 5' 9.02\" (1.753 m)   Wt 86.2 kg (190 lb) Comment: patient stated weight  SpO2 94%   BMI 28.05 kg/m²      General Exam  GENERAL APPEARANCE:  No distress, alert, interactive and cooperative.  CARDIOVASCULAR: Warm and well perfused  LUNGS: normal work of breathing on room air  EXTREMITIES: no peripheral edema     Neurologic Exam  MENTAL STATE:  Orientation was normal to time, place and person. Recent and remote memory was intact.  Attention span and concentration were normal. Language testing was normal for comprehension, repetition, expression, and naming.      CRANIAL NERVES:  CN 2       visual fields full to confrontation.  CN 3, 4, 6  Pupils round, 4 mm in diameter, equally reactive to light. Lids symmetric; no ptosis. EOMs normal alignment, full range. No nystagmus.  CN 5  Facial sensation intact " bilaterally.  CN 7  Normal and symmetric facial strength.   CN 8  Hearing intact to finger rub  CN 9  Palate elevates symmetrically.  CN 11  Normal strength of shoulder shrug and neck turning.  CN 12  Tongue midline, with normal bulk and strength.     MOTOR:  Increased tone in LUE. 4/5 strength throughout left hemibody. 5/5 on right hemibody. Moderate amplitude intention/postural tremor of right hand.      REFLEXES:  RIGHT UE   LEFT UE  BR:2              BR:1    Biceps:2      Biceps:1       RIGHT LLE   LEFT LLE    Knee:2           Knee:1    Ankle:2         Ankle:1    Babinski: Left toe is upgoing. No clonus.     SENSORY:  Decreased sensation to light touch and pinprick throughout left leg, and in left hand.      COORDINATION:   Coordination exam was normal. In both upper extremities, finger-nose-finger was intact without dysmetria or overshoot. Heel to shin was normal on the right. Heel to shin on the left was limited by weakness.      GAIT:  Unstable gait, uses walker, has normal base.  Tandem gait was deferred. No Romberg sign was present.    Administrative Statements   The total amount of time spent with the patient, on chart review, and documentation was 67 minutes. Issues addressed during this clinic visit included overall management, medication counseling or monitoring, and counseling and coordination of care.

## 2024-09-04 NOTE — PATIENT INSTRUCTIONS
It was a pleasure to see you today. We will increase your levetiracetam (Keppra) to 2g BID. We will also start lacosamide (Vimpat) 200mg twice a day. We will give you a rescue medicine called clonazepam 1mg (Klonopin) to use in case a seizure does not stop. If you need to reach me, please call the call center at [phone].

## 2024-09-05 ENCOUNTER — APPOINTMENT (OUTPATIENT)
Dept: RADIATION ONCOLOGY | Facility: CLINIC | Age: 69
End: 2024-09-05
Attending: RADIOLOGY
Payer: COMMERCIAL

## 2024-09-05 ENCOUNTER — OFFICE VISIT (OUTPATIENT)
Dept: PHYSICAL THERAPY | Facility: CLINIC | Age: 69
End: 2024-09-05
Payer: COMMERCIAL

## 2024-09-05 DIAGNOSIS — R26.2 AMBULATORY DYSFUNCTION: ICD-10-CM

## 2024-09-05 DIAGNOSIS — G81.94 LEFT HEMIPARESIS (HCC): Primary | ICD-10-CM

## 2024-09-05 PROCEDURE — 97110 THERAPEUTIC EXERCISES: CPT | Performed by: PHYSICAL THERAPIST

## 2024-09-05 PROCEDURE — 97116 GAIT TRAINING THERAPY: CPT | Performed by: PHYSICAL THERAPIST

## 2024-09-05 PROCEDURE — 97112 NEUROMUSCULAR REEDUCATION: CPT | Performed by: PHYSICAL THERAPIST

## 2024-09-05 NOTE — PROGRESS NOTES
Daily Note     Today's date: 2024  Patient name: Paras Pitts  : 1955  MRN: [id number]  Referring provider: Addy Craven MD  Dx:   Encounter Diagnosis     ICD-10-CM    1. Left hemiparesis (HCC)  G81.94       2. Ambulatory dysfunction  R26.2                      Subjective: Patient reports feeling good. Reports pain is getting better.      Objective: See treatment diary below      Assessment: Tolerated treatment well. Patient would benefit from continued PT  Patient tolerated treatment well. Replaced worn out rear leg bottoms on rolling walker with slides to improve walker performance and patient was able to ambulate safely with rolling walker. Patient would benefit from continued physical therapy to increase LE strength to improve ambulation safety and independence.       Treated by jose j MELLO under direct supervision of Abhijeet Bhatt DPT       Plan: Continue per plan of care.      Precautions: Active CA, Chemo/radiation hx of stroke       Manuals 8/29 9/3 9/5                                                              Neuro Re-Ed             Hip add 20x 20x 20x          Hip abd  20x blue 20x blue 20x  blue          LAQ  20x 2lbs 20x  2lbs 20x  2.5lbs          March  20x  2lbs 20x  2lbs 20x  2.5lbs                                                 Ther Ex             HR TR  20x 20x 20x          Mini Squat  10x 20x 20x          Step Up  20x  2lbs  Step A 20x  2lbs  Step A 20x  2.5lbs  Step A          Three way  20x  2lbs 20x  2lbs 20x  2.5lbs  Left          Nu step   L3 x 10 min  L3 x  10 min L3 x 10 min                                                 Ther Activity                                       Gait Training               3 laps 3 laps                       Modalities

## 2024-09-06 DIAGNOSIS — E78.00 HYPERCHOLESTEROLEMIA: ICD-10-CM

## 2024-09-06 DIAGNOSIS — Z76.0 MEDICATION REFILL: ICD-10-CM

## 2024-09-06 DIAGNOSIS — I10 HYPERTENSION: ICD-10-CM

## 2024-09-06 DIAGNOSIS — C79.31 METASTASIS TO BRAIN (HCC): ICD-10-CM

## 2024-09-06 RX ORDER — PANTOPRAZOLE SODIUM 40 MG/1
40 TABLET, DELAYED RELEASE ORAL DAILY
Qty: 30 TABLET | Refills: 0 | Status: CANCELLED | OUTPATIENT
Start: 2024-09-06

## 2024-09-06 RX ORDER — ATORVASTATIN CALCIUM 40 MG/1
40 TABLET, FILM COATED ORAL
Qty: 30 TABLET | Refills: 0 | Status: SHIPPED | OUTPATIENT
Start: 2024-09-06 | End: 2024-09-12 | Stop reason: SDUPTHER

## 2024-09-06 RX ORDER — LOSARTAN POTASSIUM 50 MG/1
50 TABLET ORAL DAILY
Qty: 30 TABLET | Refills: 5 | Status: SHIPPED | OUTPATIENT
Start: 2024-09-06

## 2024-09-09 ENCOUNTER — APPOINTMENT (OUTPATIENT)
Dept: RADIATION ONCOLOGY | Facility: CLINIC | Age: 69
End: 2024-09-09
Attending: STUDENT IN AN ORGANIZED HEALTH CARE EDUCATION/TRAINING PROGRAM
Payer: COMMERCIAL

## 2024-09-09 ENCOUNTER — OFFICE VISIT (OUTPATIENT)
Dept: PHYSICAL THERAPY | Facility: CLINIC | Age: 69
End: 2024-09-09
Payer: COMMERCIAL

## 2024-09-09 DIAGNOSIS — G81.94 LEFT HEMIPARESIS (HCC): Primary | ICD-10-CM

## 2024-09-09 DIAGNOSIS — R26.2 AMBULATORY DYSFUNCTION: ICD-10-CM

## 2024-09-09 PROCEDURE — 97112 NEUROMUSCULAR REEDUCATION: CPT

## 2024-09-09 PROCEDURE — 77014 CHG CT GUIDANCE RADIATION THERAPY FLDS PLACEMENT: CPT | Performed by: RADIOLOGY

## 2024-09-09 PROCEDURE — 77386 HB NTSTY MODUL RAD TX DLVR CPLX: CPT | Performed by: RADIOLOGY

## 2024-09-09 PROCEDURE — 97110 THERAPEUTIC EXERCISES: CPT

## 2024-09-09 PROCEDURE — 77427 RADIATION TX MANAGEMENT X5: CPT | Performed by: STUDENT IN AN ORGANIZED HEALTH CARE EDUCATION/TRAINING PROGRAM

## 2024-09-09 NOTE — PROGRESS NOTES
Daily Note     Today's date: 2024  Patient name: Paras Pitts  : 1955  MRN: [id number]  Referring provider: Addy Crvaen MD  Dx:   Encounter Diagnosis     ICD-10-CM    1. Left hemiparesis (HCC)  G81.94       2. Ambulatory dysfunction  R26.2                      Subjective: Pt reports his L leg and shoulder were sore following last treatment. States he had his first radiation treatment today.       Objective: See treatment diary below      Assessment: Tolerated treatment well. Did not progress with exercise secondary to increased soreness following last treatment. Patient demonstrated fatigue post treatment, exhibited good technique with therapeutic exercises, and would benefit from continued PT      Plan: Continue per plan of care.      Precautions: Active CA, Chemo/radiation hx of stroke       Manuals 8/29 9/3 9/5 9/9                                                             Neuro Re-Ed             Hip add 20x 20x 20x 20 x         Hip abd  20x blue 20x blue 20x  blue 20 x blue         LAQ  20x 2lbs 20x  2lbs 20x  2.5lbs 20 x  2.5#          March  20x  2lbs 20x  2lbs 20x  2.5lbs 20 x  2.5#                                                Ther Ex             HR TR  20x 20x 20x X20          Mini Squat  10x 20x 20x x20         Step Up  20x  2lbs  Step A 20x  2lbs  Step A 20x  2.5lbs  Step A NV         Three way  20x  2lbs 20x  2lbs 20x  2.5lbs  Left X20   2.5#          Nu step   L3 x 10 min  L3 x  10 min L3 x 10 min L2 x10 min                                                Ther Activity                                       Gait Training               3 laps 3 laps NV                      Modalities

## 2024-09-10 ENCOUNTER — APPOINTMENT (OUTPATIENT)
Dept: RADIATION ONCOLOGY | Facility: CLINIC | Age: 69
End: 2024-09-10
Attending: RADIOLOGY
Payer: COMMERCIAL

## 2024-09-10 ENCOUNTER — APPOINTMENT (OUTPATIENT)
Dept: RADIATION ONCOLOGY | Facility: CLINIC | Age: 69
End: 2024-09-10
Attending: STUDENT IN AN ORGANIZED HEALTH CARE EDUCATION/TRAINING PROGRAM
Payer: COMMERCIAL

## 2024-09-10 PROCEDURE — 77014 CHG CT GUIDANCE RADIATION THERAPY FLDS PLACEMENT: CPT | Performed by: RADIOLOGY

## 2024-09-10 PROCEDURE — 77386 HB NTSTY MODUL RAD TX DLVR CPLX: CPT | Performed by: RADIOLOGY

## 2024-09-11 ENCOUNTER — APPOINTMENT (OUTPATIENT)
Dept: RADIATION ONCOLOGY | Facility: CLINIC | Age: 69
End: 2024-09-11
Attending: STUDENT IN AN ORGANIZED HEALTH CARE EDUCATION/TRAINING PROGRAM
Payer: COMMERCIAL

## 2024-09-11 ENCOUNTER — OFFICE VISIT (OUTPATIENT)
Dept: PHYSICAL THERAPY | Facility: CLINIC | Age: 69
End: 2024-09-11
Payer: COMMERCIAL

## 2024-09-11 DIAGNOSIS — R26.2 AMBULATORY DYSFUNCTION: ICD-10-CM

## 2024-09-11 DIAGNOSIS — G81.94 LEFT HEMIPARESIS (HCC): Primary | ICD-10-CM

## 2024-09-11 PROCEDURE — 77014 CHG CT GUIDANCE RADIATION THERAPY FLDS PLACEMENT: CPT | Performed by: RADIOLOGY

## 2024-09-11 PROCEDURE — 97112 NEUROMUSCULAR REEDUCATION: CPT

## 2024-09-11 PROCEDURE — 97110 THERAPEUTIC EXERCISES: CPT

## 2024-09-11 PROCEDURE — 77386 HB NTSTY MODUL RAD TX DLVR CPLX: CPT | Performed by: RADIOLOGY

## 2024-09-11 NOTE — PROGRESS NOTES
Daily Note     Today's date: 2024  Patient name: Paras Pitts  : 1955  MRN: [id number]  Referring provider: Addy Craven MD  Dx:   Encounter Diagnosis     ICD-10-CM    1. Left hemiparesis (HCC)  G81.94       2. Ambulatory dysfunction  R26.2                      Subjective:  Patient reports that he is doing well.  He would be even better if his L groin wouldn't be bothering him.        Objective: See treatment diary below      Assessment: Patient tolerated treatment well. Patient performed ex as noted.  Patient performed ex within his tolerance without adverse response.  Patient was appropriately fatigued with the exercises performed.  Provided cues to improved gait mechanics with RW.  Patient would benefit from continued PT intervention to address deficits and attain set goals.       Plan: Continue per plan of care.      Precautions: Active CA, Chemo/radiation hx of stroke       Manuals 8/29 9/3 9/5 9/9 9/11                                                            Neuro Re-Ed             Hip add 20x 20x 20x 20 x 20x        Hip abd  20x blue 20x blue 20x  blue 20 x blue 20x   blue        LAQ  20x 2lbs 20x  2lbs 20x  2.5lbs 20 x  2.5#  2.5#  20x        March  20x  2lbs 20x  2lbs 20x  2.5lbs 20 x  2.5# 2.5#  20x  L standing/R seated                                               Ther Ex             HR TR  20x 20x 20x X20  x20        Mini Squat  10x 20x 20x x20 x20        Step Up  20x  2lbs  Step A 20x  2lbs  Step A 20x  2.5lbs  Step A NV 20x  2.5#  Step A          Three way  20x  2lbs 20x  2lbs 20x  2.5lbs  Left X20   2.5#  20x ea  2.5#        Nu step   L3 x 10 min  L3 x  10 min L3 x 10 min L2 x10 min L2  X10  min                                               Ther Activity                                       Gait Training               3 laps 3 laps NV 1 lap                     Modalities

## 2024-09-12 ENCOUNTER — APPOINTMENT (OUTPATIENT)
Dept: RADIATION ONCOLOGY | Facility: CLINIC | Age: 69
End: 2024-09-12
Attending: STUDENT IN AN ORGANIZED HEALTH CARE EDUCATION/TRAINING PROGRAM
Payer: COMMERCIAL

## 2024-09-12 ENCOUNTER — PATIENT MESSAGE (OUTPATIENT)
Dept: NEUROLOGY | Facility: CLINIC | Age: 69
End: 2024-09-12

## 2024-09-12 DIAGNOSIS — E78.00 HYPERCHOLESTEROLEMIA: ICD-10-CM

## 2024-09-12 PROCEDURE — 77014 CHG CT GUIDANCE RADIATION THERAPY FLDS PLACEMENT: CPT | Performed by: STUDENT IN AN ORGANIZED HEALTH CARE EDUCATION/TRAINING PROGRAM

## 2024-09-12 PROCEDURE — 77386 HB NTSTY MODUL RAD TX DLVR CPLX: CPT | Performed by: STUDENT IN AN ORGANIZED HEALTH CARE EDUCATION/TRAINING PROGRAM

## 2024-09-13 ENCOUNTER — APPOINTMENT (OUTPATIENT)
Dept: RADIATION ONCOLOGY | Facility: CLINIC | Age: 69
End: 2024-09-13
Attending: STUDENT IN AN ORGANIZED HEALTH CARE EDUCATION/TRAINING PROGRAM
Payer: COMMERCIAL

## 2024-09-13 ENCOUNTER — TELEPHONE (OUTPATIENT)
Age: 69
End: 2024-09-13

## 2024-09-13 PROCEDURE — 77336 RADIATION PHYSICS CONSULT: CPT | Performed by: STUDENT IN AN ORGANIZED HEALTH CARE EDUCATION/TRAINING PROGRAM

## 2024-09-13 PROCEDURE — 77014 CHG CT GUIDANCE RADIATION THERAPY FLDS PLACEMENT: CPT | Performed by: RADIOLOGY

## 2024-09-13 PROCEDURE — 77386 HB NTSTY MODUL RAD TX DLVR CPLX: CPT | Performed by: RADIOLOGY

## 2024-09-13 RX ORDER — ATORVASTATIN CALCIUM 40 MG/1
40 TABLET, FILM COATED ORAL
Qty: 30 TABLET | Refills: 0 | Status: SHIPPED | OUTPATIENT
Start: 2024-09-13

## 2024-09-13 NOTE — PATIENT COMMUNICATION
Per chart review, a PA is needed for Levetiracetam    CMM Key   [id number]    [place] [employer] PA Request Form also uploaded in media

## 2024-09-13 NOTE — PATIENT COMMUNICATION
Levetiracetam   Approved today by [employer]     Status: Approved   Coverage Starts on: 9/13/2024   Coverage Ends on: 9/13/2025

## 2024-09-16 ENCOUNTER — OFFICE VISIT (OUTPATIENT)
Dept: PHYSICAL THERAPY | Facility: CLINIC | Age: 69
End: 2024-09-16
Payer: COMMERCIAL

## 2024-09-16 ENCOUNTER — APPOINTMENT (OUTPATIENT)
Dept: RADIATION ONCOLOGY | Facility: CLINIC | Age: 69
End: 2024-09-16
Attending: STUDENT IN AN ORGANIZED HEALTH CARE EDUCATION/TRAINING PROGRAM
Payer: COMMERCIAL

## 2024-09-16 DIAGNOSIS — R26.2 AMBULATORY DYSFUNCTION: ICD-10-CM

## 2024-09-16 DIAGNOSIS — G81.94 LEFT HEMIPARESIS (HCC): Primary | ICD-10-CM

## 2024-09-16 PROCEDURE — 77427 RADIATION TX MANAGEMENT X5: CPT | Performed by: STUDENT IN AN ORGANIZED HEALTH CARE EDUCATION/TRAINING PROGRAM

## 2024-09-16 PROCEDURE — 97110 THERAPEUTIC EXERCISES: CPT | Performed by: PHYSICAL THERAPIST

## 2024-09-16 PROCEDURE — 77014 CHG CT GUIDANCE RADIATION THERAPY FLDS PLACEMENT: CPT | Performed by: RADIOLOGY

## 2024-09-16 PROCEDURE — 97112 NEUROMUSCULAR REEDUCATION: CPT | Performed by: PHYSICAL THERAPIST

## 2024-09-16 PROCEDURE — 77386 HB NTSTY MODUL RAD TX DLVR CPLX: CPT | Performed by: RADIOLOGY

## 2024-09-16 NOTE — PROGRESS NOTES
Daily Note     Today's date: 2024  Patient name: Paras Pitts  : 1955  MRN: [id number]  Referring provider: Addy Craven MD  Dx:   Encounter Diagnosis     ICD-10-CM    1. Left hemiparesis (HCC)  G81.94       2. Ambulatory dysfunction  R26.2                      Subjective: Patient reports feeling fatigued from radiation therapy earlier.       Objective: See treatment diary below      Assessment: Tolerated treatment fair. Patient demonstrated fatigue post treatment and would benefit from continued PT  Patient responded fair to session. Required frequent rest breaks between exercises due to recent radiation therapy making him feel fatigued. Was able to perform all exercises weight bearing on right side. Could not perform standing marches or 3-ways weight bearing on left due to left hip pain. Reported no adverse affects from treatment. Patient would benefit from continued skilled physical therapy to increase LE strength to improve safety while ambulating.     Treated by jose j MELLO under direct supervision of Abhijeet Bhatt DPT       Plan: Continue per plan of care.      Precautions: Active CA, Chemo/radiation hx of stroke       Manuals 8/29 9/3 9/5 9/9 9/11 9/16                                                           Neuro Re-Ed             Hip add 20x 20x 20x 20 x 20x 20x       Hip abd  20x blue 20x blue 20x  blue 20 x blue 20x   blue 20x  blue       LAQ  20x 2lbs 20x  2lbs 20x  2.5lbs 20 x  2.5#  2.5#  20x 2.5#  20x ea       March  20x  2lbs 20x  2lbs 20x  2.5lbs 20 x  2.5# 2.5#  20x  L standing/R seated 2.5#  20x  L standing/R seated                                              Ther Ex             HR TR  20x 20x 20x X20  x20 x20       Mini Squat  10x 20x 20x x20 x20 x20       Step Up  20x  2lbs  Step A 20x  2lbs  Step A 20x  2.5lbs  Step A NV 20x  2.5#  Step A   20x  2.5#  Step A       Three way  20x  2lbs 20x  2lbs 20x  2.5lbs  Left X20   2.5#  20x ea  2.5# 20x standing on right  2.5#        Nu step   L3 x 10 min  L3 x  10 min L3 x 10 min L2 x10 min L2  X10  min L2  X10 min                                              Ther Activity                                       Gait Training               3 laps 3 laps NV 1 lap 2 laps                    Modalities

## 2024-09-17 ENCOUNTER — APPOINTMENT (OUTPATIENT)
Dept: RADIATION ONCOLOGY | Facility: CLINIC | Age: 69
End: 2024-09-17
Attending: STUDENT IN AN ORGANIZED HEALTH CARE EDUCATION/TRAINING PROGRAM
Payer: COMMERCIAL

## 2024-09-17 PROCEDURE — 77014 CHG CT GUIDANCE RADIATION THERAPY FLDS PLACEMENT: CPT | Performed by: RADIOLOGY

## 2024-09-17 PROCEDURE — 77386 HB NTSTY MODUL RAD TX DLVR CPLX: CPT | Performed by: RADIOLOGY

## 2024-09-18 ENCOUNTER — OFFICE VISIT (OUTPATIENT)
Dept: PHYSICAL THERAPY | Facility: CLINIC | Age: 69
End: 2024-09-18
Payer: COMMERCIAL

## 2024-09-18 ENCOUNTER — APPOINTMENT (OUTPATIENT)
Dept: RADIATION ONCOLOGY | Facility: CLINIC | Age: 69
End: 2024-09-18
Attending: RADIOLOGY
Payer: COMMERCIAL

## 2024-09-18 ENCOUNTER — APPOINTMENT (OUTPATIENT)
Dept: RADIATION ONCOLOGY | Facility: CLINIC | Age: 69
End: 2024-09-18
Attending: STUDENT IN AN ORGANIZED HEALTH CARE EDUCATION/TRAINING PROGRAM
Payer: COMMERCIAL

## 2024-09-18 DIAGNOSIS — G81.94 LEFT HEMIPARESIS (HCC): Primary | ICD-10-CM

## 2024-09-18 DIAGNOSIS — R26.2 AMBULATORY DYSFUNCTION: ICD-10-CM

## 2024-09-18 PROCEDURE — 97116 GAIT TRAINING THERAPY: CPT

## 2024-09-18 PROCEDURE — 97112 NEUROMUSCULAR REEDUCATION: CPT

## 2024-09-18 PROCEDURE — 77386 HB NTSTY MODUL RAD TX DLVR CPLX: CPT | Performed by: RADIOLOGY

## 2024-09-18 PROCEDURE — 77014 CHG CT GUIDANCE RADIATION THERAPY FLDS PLACEMENT: CPT | Performed by: RADIOLOGY

## 2024-09-18 PROCEDURE — 97110 THERAPEUTIC EXERCISES: CPT

## 2024-09-18 NOTE — PROGRESS NOTES
Daily Note     Today's date: 2024  Patient name: Paras Pitts  : 1955  MRN: [id number]  Referring provider: Addy Craven MD  Dx:   Encounter Diagnosis     ICD-10-CM    1. Left hemiparesis (HCC)  G81.94       2. Ambulatory dysfunction  R26.2                      Subjective:  Patient reports that he is doing ok.  He continues to feel fatigued from radiation treatments.  Patient reported that he had to sit down on steps he tried at a restaurant this past weekend.  He would like to work on steps.        Objective: See treatment diary below      Assessment:  Patient tolerated treatment well. Patient performed ex as noted.  Trialed 4 consecutive steps up/down with a gait belt cues for correct sequencing   ( Up lead with R and down lead with the L).  Patient demonstrated a good carry over of cues.  Reviewed sequencing with the patient's wife.  Patient performed ex with short rest breaks as needed with no adverse response.  Patient would benefit from continued PT intervention to address deficits and attain set goals.       Plan: Continue per plan of care.      Precautions: Active CA, Chemo/radiation hx of stroke       Manuals 8/29 9/3 9/5 9/9 9/11 9/16 9/18                                                          Neuro Re-Ed             Hip add 20x 20x 20x 20 x 20x 20x 20x      Hip abd  20x blue 20x blue 20x  blue 20 x blue 20x   blue 20x  blue 20x   blue      LAQ  20x 2lbs 20x  2lbs 20x  2.5lbs 20 x  2.5#  2.5#  20x 2.5#  20x ea 2.5#  20x ea      March  20x  2lbs 20x  2lbs 20x  2.5lbs 20 x  2.5# 2.5#  20x  L standing/R seated 2.5#  20x  L standing/R seated 2.5#  L standing  R seated  X20 ea                                             Ther Ex             HR TR  20x 20x 20x X20  x20 x20 x20      Mini Squat  10x 20x 20x x20 x20 x20 x20      Step Up  20x  2lbs  Step A 20x  2lbs  Step A 20x  2.5lbs  Step A NV 20x  2.5#  Step A   20x  2.5#  Step A Full stairs see below      Three way  20x  2lbs 20x  2lbs  20x  2.5lbs  Left X20   2.5#  20x ea  2.5# 20x standing on right  2.5# L only  2.5#  X20 ea      Nu step   L3 x 10 min  L3 x  10 min L3 x 10 min L2 x10 min L2  X10  min L2  X10 min L2  x10  min                                             Ther Activity                                       Gait Training               3 laps 3 laps NV 1 lap 2 laps 1 lap             Step training  X3 up/down      Modalities

## 2024-09-19 ENCOUNTER — APPOINTMENT (OUTPATIENT)
Dept: RADIATION ONCOLOGY | Facility: CLINIC | Age: 69
End: 2024-09-19
Attending: STUDENT IN AN ORGANIZED HEALTH CARE EDUCATION/TRAINING PROGRAM
Payer: COMMERCIAL

## 2024-09-19 ENCOUNTER — APPOINTMENT (OUTPATIENT)
Dept: RADIATION ONCOLOGY | Facility: CLINIC | Age: 69
End: 2024-09-19
Attending: RADIOLOGY
Payer: COMMERCIAL

## 2024-09-19 ENCOUNTER — HOSPITAL ENCOUNTER (OUTPATIENT)
Dept: INFUSION CENTER | Facility: HOSPITAL | Age: 69
Discharge: HOME | End: 2024-09-19
Attending: INTERNAL MEDICINE

## 2024-09-19 PROCEDURE — 77386 HB NTSTY MODUL RAD TX DLVR CPLX: CPT | Performed by: STUDENT IN AN ORGANIZED HEALTH CARE EDUCATION/TRAINING PROGRAM

## 2024-09-19 PROCEDURE — 77014 CHG CT GUIDANCE RADIATION THERAPY FLDS PLACEMENT: CPT | Performed by: STUDENT IN AN ORGANIZED HEALTH CARE EDUCATION/TRAINING PROGRAM

## 2024-09-20 ENCOUNTER — APPOINTMENT (OUTPATIENT)
Dept: RADIATION ONCOLOGY | Facility: CLINIC | Age: 69
End: 2024-09-20
Attending: STUDENT IN AN ORGANIZED HEALTH CARE EDUCATION/TRAINING PROGRAM
Payer: COMMERCIAL

## 2024-09-20 ENCOUNTER — TELEPHONE (OUTPATIENT)
Dept: NEUROLOGY | Facility: CLINIC | Age: 69
End: 2024-09-20

## 2024-09-20 PROCEDURE — 77386 HB NTSTY MODUL RAD TX DLVR CPLX: CPT | Performed by: RADIOLOGY

## 2024-09-20 PROCEDURE — 77014 CHG CT GUIDANCE RADIATION THERAPY FLDS PLACEMENT: CPT | Performed by: RADIOLOGY

## 2024-09-20 PROCEDURE — 77336 RADIATION PHYSICS CONSULT: CPT | Performed by: STUDENT IN AN ORGANIZED HEALTH CARE EDUCATION/TRAINING PROGRAM

## 2024-09-20 NOTE — TELEPHONE ENCOUNTER
Received letter via mail from [facility] regarding LEVETIRACETAM 1000 mg tablet.  It states your authorization may be updated to a generic/biosimilar  if one becomes available.

## 2024-09-23 ENCOUNTER — APPOINTMENT (OUTPATIENT)
Dept: RADIATION ONCOLOGY | Facility: CLINIC | Age: 69
End: 2024-09-23
Attending: RADIOLOGY
Payer: COMMERCIAL

## 2024-09-23 ENCOUNTER — APPOINTMENT (OUTPATIENT)
Dept: RADIATION ONCOLOGY | Facility: CLINIC | Age: 69
End: 2024-09-23
Attending: STUDENT IN AN ORGANIZED HEALTH CARE EDUCATION/TRAINING PROGRAM
Payer: COMMERCIAL

## 2024-09-23 ENCOUNTER — OFFICE VISIT (OUTPATIENT)
Dept: PHYSICAL THERAPY | Facility: CLINIC | Age: 69
End: 2024-09-23
Payer: COMMERCIAL

## 2024-09-23 DIAGNOSIS — R26.2 AMBULATORY DYSFUNCTION: ICD-10-CM

## 2024-09-23 DIAGNOSIS — G81.94 LEFT HEMIPARESIS (HCC): Primary | ICD-10-CM

## 2024-09-23 PROCEDURE — 97116 GAIT TRAINING THERAPY: CPT

## 2024-09-23 PROCEDURE — 97112 NEUROMUSCULAR REEDUCATION: CPT

## 2024-09-23 PROCEDURE — 97110 THERAPEUTIC EXERCISES: CPT

## 2024-09-23 NOTE — PROGRESS NOTES
Daily Note     Today's date: 2024  Patient name: Paras Pitts  : 1955  MRN: [id number]  Referring provider: Addy Craven MD  Dx:   Encounter Diagnosis     ICD-10-CM    1. Left hemiparesis (HCC)  G81.94       2. Ambulatory dysfunction  R26.2                      Subjective: Pt reports he is sore all over today. States he was sore over the weekend.       Objective: See treatment diary below      Assessment: Tolerated treatment well. Issued handouts and reviewed with patient for HEP. Reviewed safe sit to stand and return to sit transfers. Patient demonstrated fatigue post treatment, exhibited good technique with therapeutic exercises, and would benefit from continued PT      Plan: Continue per plan of care.      Precautions: Active CA, Chemo/radiation hx of stroke       Manuals 8/29 9/3 9/5 9/9 9/11 9/16 9/18 9/23                                                         Neuro Re-Ed             Hip add 20x 20x 20x 20 x 20x 20x 20x x20     Hip abd  20x blue 20x blue 20x  blue 20 x blue 20x   blue 20x  blue 20x   blue X20  Blue     LAQ  20x 2lbs 20x  2lbs 20x  2.5lbs 20 x  2.5#  2.5#  20x 2.5#  20x ea 2.5#  20x ea 2.5#   X20 ea     March  20x  2lbs 20x  2lbs 20x  2.5lbs 20 x  2.5# 2.5#  20x  L standing/R seated 2.5#  20x  L standing/R seated 2.5#  L standing  R seated  X20 ea 2.5% L standing R seated x20 ea                                            Ther Ex             HR TR  20x 20x 20x X20  x20 x20 x20 X20      Mini Squat  10x 20x 20x x20 x20 x20 x20 x20     Step Up  20x  2lbs  Step A 20x  2lbs  Step A 20x  2.5lbs  Step A NV 20x  2.5#  Step A   20x  2.5#  Step A Full stairs see below See below     Three way  20x  2lbs 20x  2lbs 20x  2.5lbs  Left X20   2.5#  20x ea  2.5# 20x standing on right  2.5# L only  2.5#  X20 ea L only 2.5#   X20 ea     Nu step   L3 x 10 min  L3 x  10 min L3 x 10 min L2 x10 min L2  X10  min L2  X10 min L2  x10  min L2 x10 min                                            Ther Activity    "                                    Gait Training               3 laps 3 laps NV 1 lap 2 laps 1 lap 2 laps   1 rest            Step training  X3 up/down Step traininup dpwn 4 steps x2     Modalities                                             Access Code: [email or web address]  URL: [email or web address]  Date: 09/23/2024  Prepared by: Jaclyn Ramos    Exercises  - Seated Hip Adduction Squeeze with Ball  - 1 x daily - 3 x weekly - 2 sets - 10 reps - 5\" hold  - Seated Hip Abduction with Resistance  - 1 x daily - 3 x weekly - 2 sets - 10 reps - 5\" hold  - Seated Long Arc Quad  - 1 x daily - 3 x weekly - 2 sets - 10 reps - 5\" hold  - Seated Toe Raise  - 1 x daily - 3 x weekly - 2 sets - 10 reps  - Seated March  - 1 x daily - 3 x weekly - 2 sets - 10 reps             "

## 2024-09-24 ENCOUNTER — APPOINTMENT (OUTPATIENT)
Dept: RADIATION ONCOLOGY | Facility: CLINIC | Age: 69
End: 2024-09-24
Attending: STUDENT IN AN ORGANIZED HEALTH CARE EDUCATION/TRAINING PROGRAM
Payer: COMMERCIAL

## 2024-09-24 ENCOUNTER — APPOINTMENT (OUTPATIENT)
Dept: RADIATION ONCOLOGY | Facility: CLINIC | Age: 69
End: 2024-09-24
Attending: RADIOLOGY
Payer: COMMERCIAL

## 2024-09-24 PROCEDURE — 77014 CHG CT GUIDANCE RADIATION THERAPY FLDS PLACEMENT: CPT | Performed by: RADIOLOGY

## 2024-09-24 PROCEDURE — 77386 HB NTSTY MODUL RAD TX DLVR CPLX: CPT | Performed by: RADIOLOGY

## 2024-09-24 PROCEDURE — 77427 RADIATION TX MANAGEMENT X5: CPT | Performed by: RADIOLOGY

## 2024-09-25 ENCOUNTER — APPOINTMENT (OUTPATIENT)
Dept: RADIATION ONCOLOGY | Facility: CLINIC | Age: 69
End: 2024-09-25
Attending: STUDENT IN AN ORGANIZED HEALTH CARE EDUCATION/TRAINING PROGRAM
Payer: COMMERCIAL

## 2024-09-25 ENCOUNTER — APPOINTMENT (OUTPATIENT)
Dept: RADIATION ONCOLOGY | Facility: CLINIC | Age: 69
End: 2024-09-25
Attending: RADIOLOGY
Payer: COMMERCIAL

## 2024-09-25 ENCOUNTER — OFFICE VISIT (OUTPATIENT)
Dept: PHYSICAL THERAPY | Facility: CLINIC | Age: 69
End: 2024-09-25
Payer: COMMERCIAL

## 2024-09-25 DIAGNOSIS — R26.2 AMBULATORY DYSFUNCTION: Primary | ICD-10-CM

## 2024-09-25 DIAGNOSIS — G81.94 LEFT HEMIPARESIS (HCC): ICD-10-CM

## 2024-09-25 PROCEDURE — 97116 GAIT TRAINING THERAPY: CPT

## 2024-09-25 PROCEDURE — 97110 THERAPEUTIC EXERCISES: CPT

## 2024-09-25 PROCEDURE — 77014 CHG CT GUIDANCE RADIATION THERAPY FLDS PLACEMENT: CPT | Performed by: RADIOLOGY

## 2024-09-25 PROCEDURE — 97112 NEUROMUSCULAR REEDUCATION: CPT

## 2024-09-25 PROCEDURE — 77386 HB NTSTY MODUL RAD TX DLVR CPLX: CPT | Performed by: RADIOLOGY

## 2024-09-25 NOTE — PROGRESS NOTES
Daily Note     Today's date: 2024  Patient name: Paras Pitts  : 1955  MRN: [id number]  Referring provider: Addy Craven MD  Dx:   Encounter Diagnosis     ICD-10-CM    1. Ambulatory dysfunction  R26.2       2. Left hemiparesis (HCC)  G81.94                      Subjective:  Patient reports that his L groin has been bothering him a bit more today.  [place] reports that he is unsure why- states maybe the weather is causing it.        Objective: See treatment diary below      Assessment: Patient tolerated treatment well. Patient performed ex and functional activity as noted with direct supervision.  Modified marching to seated today on L due to increased L groin pain.  LE weakness was more evident today during session possibly due to increased L groin pain and fatigue from radiation treatment earlier today.  Patient would benefit from continued PT intervention to address deficits and attain set goals.       Plan: Continue per plan of care.      Precautions: Active CA, Chemo/radiation hx of stroke       Manuals 8/29 9/3 9/5 9/9 9/11 9/16 9/18 9/23 9/25                                                        Neuro Re-Ed             Hip add 20x 20x 20x 20 x 20x 20x 20x x20 x20    Hip abd  20x blue 20x blue 20x  blue 20 x blue 20x   blue 20x  blue 20x   blue X20  Blue x20  blue    LAQ  20x 2lbs 20x  2lbs 20x  2.5lbs 20 x  2.5#  2.5#  20x 2.5#  20x ea 2.5#  20x ea 2.5#   X20 ea 2.5#  X20 ea    March  20x  2lbs 20x  2lbs 20x  2.5lbs 20 x  2.5# 2.5#  20x  L standing/R seated 2.5#  20x  L standing/R seated 2.5#  L standing  R seated  X20 ea 2.5% L standing R seated x20 ea 2.5#  Both seated today  L groin pain                                           Ther Ex             HR TR  20x 20x 20x X20  x20 x20 x20 X20  x20    Mini Squat  10x 20x 20x x20 x20 x20 x20 x20 x20    Step Up  20x  2lbs  Step A 20x  2lbs  Step A 20x  2.5lbs  Step A NV 20x  2.5#  Step A   20x  2.5#  Step A Full stairs see below See below See below   "  Three way  20x  2lbs 20x  2lbs 20x  2.5lbs  Left X20   2.5#  20x ea  2.5# 20x standing on right  2.5# L only  2.5#  X20 ea L only 2.5#   X20 ea L only 2.5# 20x ea    Nu step   L3 x 10 min  L3 x  10 min L3 x 10 min L2 x10 min L2  X10  min L2  X10 min L2  x10  min L2 x10 min L3 x10 min                                           Ther Activity                                       Gait Training               3 laps 3 laps NV 1 lap 2 laps 1 lap 2 laps   1 rest 2 laps to steps           Step training  X3 up/down Step traininup dpwn 4 steps x2 Step training up/down 4 steps  X2 with gait belt    Modalities                                             Access Code: [email or web address]  URL: [email or web address]  Date: 09/23/2024  Prepared by: Jaclyn Ramos    Exercises  - Seated Hip Adduction Squeeze with Ball  - 1 x daily - 3 x weekly - 2 sets - 10 reps - 5\" hold  - Seated Hip Abduction with Resistance  - 1 x daily - 3 x weekly - 2 sets - 10 reps - 5\" hold  - Seated Long Arc Quad  - 1 x daily - 3 x weekly - 2 sets - 10 reps - 5\" hold  - Seated Toe Raise  - 1 x daily - 3 x weekly - 2 sets - 10 reps  - Seated March  - 1 x daily - 3 x weekly - 2 sets - 10 reps               "

## 2024-09-26 ENCOUNTER — APPOINTMENT (OUTPATIENT)
Dept: RADIATION ONCOLOGY | Facility: CLINIC | Age: 69
End: 2024-09-26
Attending: STUDENT IN AN ORGANIZED HEALTH CARE EDUCATION/TRAINING PROGRAM
Payer: COMMERCIAL

## 2024-09-26 ENCOUNTER — APPOINTMENT (OUTPATIENT)
Dept: RADIATION ONCOLOGY | Facility: CLINIC | Age: 69
End: 2024-09-26
Attending: RADIOLOGY
Payer: COMMERCIAL

## 2024-09-26 PROCEDURE — 77386 HB NTSTY MODUL RAD TX DLVR CPLX: CPT | Performed by: RADIOLOGY

## 2024-09-26 PROCEDURE — 77014 CHG CT GUIDANCE RADIATION THERAPY FLDS PLACEMENT: CPT | Performed by: RADIOLOGY

## 2024-09-27 ENCOUNTER — APPOINTMENT (OUTPATIENT)
Dept: RADIATION ONCOLOGY | Facility: CLINIC | Age: 69
End: 2024-09-27
Attending: STUDENT IN AN ORGANIZED HEALTH CARE EDUCATION/TRAINING PROGRAM
Payer: COMMERCIAL

## 2024-09-27 PROCEDURE — 77386 HB NTSTY MODUL RAD TX DLVR CPLX: CPT | Performed by: RADIOLOGY

## 2024-09-27 PROCEDURE — 77014 CHG CT GUIDANCE RADIATION THERAPY FLDS PLACEMENT: CPT | Performed by: RADIOLOGY

## 2024-09-30 ENCOUNTER — OFFICE VISIT (OUTPATIENT)
Dept: PHYSICAL THERAPY | Facility: CLINIC | Age: 69
End: 2024-09-30
Payer: COMMERCIAL

## 2024-09-30 ENCOUNTER — APPOINTMENT (OUTPATIENT)
Dept: RADIATION ONCOLOGY | Facility: CLINIC | Age: 69
End: 2024-09-30
Attending: STUDENT IN AN ORGANIZED HEALTH CARE EDUCATION/TRAINING PROGRAM
Payer: COMMERCIAL

## 2024-09-30 DIAGNOSIS — G81.94 LEFT HEMIPARESIS (HCC): ICD-10-CM

## 2024-09-30 DIAGNOSIS — R26.2 AMBULATORY DYSFUNCTION: Primary | ICD-10-CM

## 2024-09-30 PROCEDURE — 97112 NEUROMUSCULAR REEDUCATION: CPT

## 2024-09-30 PROCEDURE — 97110 THERAPEUTIC EXERCISES: CPT

## 2024-09-30 PROCEDURE — 77014 CHG CT GUIDANCE RADIATION THERAPY FLDS PLACEMENT: CPT | Performed by: RADIOLOGY

## 2024-09-30 PROCEDURE — 77336 RADIATION PHYSICS CONSULT: CPT | Performed by: RADIOLOGY

## 2024-09-30 PROCEDURE — 77386 HB NTSTY MODUL RAD TX DLVR CPLX: CPT | Performed by: RADIOLOGY

## 2024-09-30 PROCEDURE — 97116 GAIT TRAINING THERAPY: CPT

## 2024-09-30 NOTE — PROGRESS NOTES
Daily Note     Today's date: 2024  Patient name: Paras Pitts  : 1955  MRN: [id number]  Referring provider: Addy Craven MD  Dx:   Encounter Diagnosis     ICD-10-CM    1. Ambulatory dysfunction  R26.2       2. Left hemiparesis (HCC)  G81.94           Start Time: 1628  Stop Time: 1730  Total time in clinic (min): 62 minutes    Subjective:  Patient reports fatigue today due to radiation.       Objective: See treatment diary below      Assessment: Patient tolerated treatment well. Patient had notable fatigue throughout tx today that improved with seated rests. He was most challenged by three way straight leg raise with LLE. He had LOB on rep 4 of step ups that required MAX-A-X2. With verbal and tactile cueing to maintain upright posture when ascending step he was able to complete two more sets before fatigue prohibited anymore repetitions. After three minute seated rest he was able to ambulate in clinic area x2 laps continuing through waiting area out to parking lot. Pt was advised to pay mind to his fatigue and alter routine accordingly to decrease risk of falls. Patient would benefit from continued PT intervention to address deficits and attain set goals.       Plan: Continue per plan of care.      Precautions: Active CA, Chemo/radiation hx of stroke       Manuals 8/29 9/3 9/5 9/9 9/11 9/16 9/18 9/23 9/25 9/30                                                       Neuro Re-Ed             Hip add 20x 20x 20x 20 x 20x 20x 20x x20 x20 x20   Hip abd  20x blue 20x blue 20x  blue 20 x blue 20x   blue 20x  blue 20x   blue X20  Blue x20  blue x20  blue   LAQ  20x 2lbs 20x  2lbs 20x  2.5lbs 20 x  2.5#  2.5#  20x 2.5#  20x ea 2.5#  20x ea 2.5#   X20 ea 2.5#  X20 ea 2.5#  X20 ea   March  20x  2lbs 20x  2lbs 20x  2.5lbs 20 x  2.5# 2.5#  20x  L standing/R seated 2.5#  20x  L standing/R seated 2.5#  L standing  R seated  X20 ea 2.5% L standing R seated x20 ea 2.5#  Both seated today  L groin pain 2.5#  Both seated  "today  L groin pain                                          Ther Ex             HR TR  20x 20x 20x X20  x20 x20 x20 X20  x20 x20   Mini Squat  10x 20x 20x x20 x20 x20 x20 x20 x20 x20   Step Up  20x  2lbs  Step A 20x  2lbs  Step A 20x  2.5lbs  Step A NV 20x  2.5#  Step A   20x  2.5#  Step A Full stairs see below See below See below See below   Three way  20x  2lbs 20x  2lbs 20x  2.5lbs  Left X20   2.5#  20x ea  2.5# 20x standing on right  2.5# L only  2.5#  X20 ea L only 2.5#   X20 ea L only 2.5# 20x ea L only 2.5# 20x ea   Nu step   L3 x 10 min  L3 x  10 min L3 x 10 min L2 x10 min L2  X10  min L2  X10 min L2  x10  min L2 x10 min L3 x10 min L3 x10 min                                          Ther Activity                                       Gait Training               3 laps 3 laps NV 1 lap 2 laps 1 lap 2 laps   1 rest 2 laps to steps 2 laps in clinic to car          Step training  X3 up/down Step traininup dpwn 4 steps x2 Step training up/down 4 steps  X2 with gait belt Step ups 4\" 1x7    Modalities                                             Access Code: [email or web address]  URL: [email or web address]  Date: 09/23/2024  Prepared by: Jaclyn Ramos    Exercises  - Seated Hip Adduction Squeeze with Ball  - 1 x daily - 3 x weekly - 2 sets - 10 reps - 5\" hold  - Seated Hip Abduction with Resistance  - 1 x daily - 3 x weekly - 2 sets - 10 reps - 5\" hold  - Seated Long Arc Quad  - 1 x daily - 3 x weekly - 2 sets - 10 reps - 5\" hold  - Seated Toe Raise  - 1 x daily - 3 x weekly - 2 sets - 10 reps  - Seated March  - 1 x daily - 3 x weekly - 2 sets - 10 reps               "

## 2024-10-01 ENCOUNTER — APPOINTMENT (OUTPATIENT)
Dept: RADIATION ONCOLOGY | Facility: CLINIC | Age: 69
End: 2024-10-01
Payer: COMMERCIAL

## 2024-10-01 ENCOUNTER — APPOINTMENT (OUTPATIENT)
Dept: RADIATION ONCOLOGY | Facility: CLINIC | Age: 69
End: 2024-10-01
Attending: STUDENT IN AN ORGANIZED HEALTH CARE EDUCATION/TRAINING PROGRAM
Payer: COMMERCIAL

## 2024-10-01 PROCEDURE — 77386 HB NTSTY MODUL RAD TX DLVR CPLX: CPT | Performed by: STUDENT IN AN ORGANIZED HEALTH CARE EDUCATION/TRAINING PROGRAM

## 2024-10-01 PROCEDURE — 77427 RADIATION TX MANAGEMENT X5: CPT | Performed by: STUDENT IN AN ORGANIZED HEALTH CARE EDUCATION/TRAINING PROGRAM

## 2024-10-01 PROCEDURE — 77014 CHG CT GUIDANCE RADIATION THERAPY FLDS PLACEMENT: CPT | Performed by: STUDENT IN AN ORGANIZED HEALTH CARE EDUCATION/TRAINING PROGRAM

## 2024-10-02 ENCOUNTER — APPOINTMENT (OUTPATIENT)
Dept: RADIATION ONCOLOGY | Facility: CLINIC | Age: 69
End: 2024-10-02
Attending: STUDENT IN AN ORGANIZED HEALTH CARE EDUCATION/TRAINING PROGRAM
Payer: COMMERCIAL

## 2024-10-02 ENCOUNTER — TELEPHONE (OUTPATIENT)
Age: 69
End: 2024-10-02

## 2024-10-02 ENCOUNTER — OFFICE VISIT (OUTPATIENT)
Dept: PHYSICAL THERAPY | Facility: CLINIC | Age: 69
End: 2024-10-02
Payer: COMMERCIAL

## 2024-10-02 ENCOUNTER — APPOINTMENT (OUTPATIENT)
Dept: RADIATION ONCOLOGY | Facility: CLINIC | Age: 69
End: 2024-10-02
Payer: COMMERCIAL

## 2024-10-02 DIAGNOSIS — G81.94 LEFT HEMIPARESIS (HCC): ICD-10-CM

## 2024-10-02 DIAGNOSIS — R26.2 AMBULATORY DYSFUNCTION: Primary | ICD-10-CM

## 2024-10-02 PROCEDURE — 97112 NEUROMUSCULAR REEDUCATION: CPT

## 2024-10-02 PROCEDURE — 77386 HB NTSTY MODUL RAD TX DLVR CPLX: CPT | Performed by: RADIOLOGY

## 2024-10-02 PROCEDURE — 97110 THERAPEUTIC EXERCISES: CPT

## 2024-10-02 PROCEDURE — 77014 CHG CT GUIDANCE RADIATION THERAPY FLDS PLACEMENT: CPT | Performed by: RADIOLOGY

## 2024-10-02 PROCEDURE — 97116 GAIT TRAINING THERAPY: CPT

## 2024-10-02 NOTE — TELEPHONE ENCOUNTER
Patient was rescheduled to 10/16 with Dr Langford, but they couldn't make it to that appointment because he is scheduled for radiation.  I scheduled for the next available on 11/27/24 & put them on the wait list.      The wife asked me to send a message to the office because she said that she thinks the doctor will want him to be seen before that.  She asked that I pass this message to Elaine because that is who they usually deal with.  Please call her back to confirm receipt of this message & discuss possibly getting them in sooner.

## 2024-10-02 NOTE — PROGRESS NOTES
Daily Note     Today's date: 10/2/2024  Patient name: Paras Pitts  : 1955  MRN: [id number]  Referring provider: Addy Craven MD  Dx:   Encounter Diagnosis     ICD-10-CM    1. Ambulatory dysfunction  R26.2       2. Left hemiparesis (HCC)  G81.94                      Subjective: Pt reports he was tired after radiation treatment today, took a nap before PT.       Objective: See treatment diary below    e  Assessment: Tolerated treatment well. Ambulated in clinic and performed fight of stairs prior to Nustep in attempt to avoid fatigue. Pt continued to c/o L hip/thigh soreness during resisted march and LAQ, decreased resistance on L to improve tolerance. Pt demonstrated steady step to sequence on stairs today with CGA for safety.  Patient demonstrated fatigue post treatment, exhibited good technique with therapeutic exercises, and would benefit from continued PT      Plan: Continue per plan of care.      Precautions: Active CA, Chemo/radiation hx of stroke       Manuals 9/3 9/5 9/9 9/11 9/16 9/18 9/23 9/25 9/30 10/2                                                       Neuro Re-Ed             Hip add 20x 20x 20 x 20x 20x 20x x20 x20 x20 x20   Hip abd  20x blue 20x  blue 20 x blue 20x   blue 20x  blue 20x   blue X20  Blue x20  blue x20  blue x20   LAQ  20x  2lbs 20x  2.5lbs 20 x  2.5#  2.5#  20x 2.5#  20x ea 2.5#  20x ea 2.5#   X20 ea 2.5#  X20 ea 2.5#  X20 ea R 2.5#  X20  Ea  L 0# 2x10   March  20x  2lbs 20x  2.5lbs 20 x  2.5# 2.5#  20x  L standing/R seated 2.5#  20x  L standing/R seated 2.5#  L standing  R seated  X20 ea 2.5% L standing R seated x20 ea 2.5#  Both seated today  L groin pain 2.5#  Both seated today  L groin pain R 2.5#   L 0# x20 ea                                          Ther Ex             HR TR  20x 20x X20  x20 x20 x20 X20  x20 x20 x20   Mini Squat  20x 20x x20 x20 x20 x20 x20 x20 x20 NV   Step Up  20x  2lbs  Step A 20x  2.5lbs  Step A NV 20x  2.5#  Step A   20x  2.5#  Step A Full stairs  "see below See below See below See below L only  4\" 0# x10   Three way  20x  2lbs 20x  2.5lbs  Left X20   2.5#  20x ea  2.5# 20x standing on right  2.5# L only  2.5#  X20 ea L only 2.5#   X20 ea L only 2.5# 20x ea L only 2.5# 20x ea L only 2.5# 20x ea   Nu step   L3 x  10 min L3 x 10 min L2 x10 min L2  X10  min L2  X10 min L2  x10  min L2 x10 min L3 x10 min L3 x10 min L3 x10 min                                          Ther Activity                                       Gait Training              3 laps 3 laps NV 1 lap 2 laps 1 lap 2 laps   1 rest 2 laps to steps 2 laps in clinic to car 2 laps          Step training  X3 up/down Step traininup dpwn 4 steps x2 Step training up/down 4 steps  X2 with gait belt Step ups 4\" 1x7  Step training up/down 4 steps  X2 with gait belt   Modalities                                             Access Code: [email or web address]  URL: [email or web address]  Date: 09/23/2024  Prepared by: Jaclyn Ramos    Exercises  - Seated Hip Adduction Squeeze with Ball  - 1 x daily - 3 x weekly - 2 sets - 10 reps - 5\" hold  - Seated Hip Abduction with Resistance  - 1 x daily - 3 x weekly - 2 sets - 10 reps - 5\" hold  - Seated Long Arc Quad  - 1 x daily - 3 x weekly - 2 sets - 10 reps - 5\" hold  - Seated Toe Raise  - 1 x daily - 3 x weekly - 2 sets - 10 reps  - Seated March  - 1 x daily - 3 x weekly - 2 sets - 10 reps                 "

## 2024-10-03 ENCOUNTER — APPOINTMENT (OUTPATIENT)
Dept: RADIATION ONCOLOGY | Facility: CLINIC | Age: 69
End: 2024-10-03
Payer: COMMERCIAL

## 2024-10-03 ENCOUNTER — APPOINTMENT (OUTPATIENT)
Dept: RADIATION ONCOLOGY | Facility: CLINIC | Age: 69
End: 2024-10-03
Attending: STUDENT IN AN ORGANIZED HEALTH CARE EDUCATION/TRAINING PROGRAM
Payer: COMMERCIAL

## 2024-10-03 PROCEDURE — 77386 HB NTSTY MODUL RAD TX DLVR CPLX: CPT | Performed by: STUDENT IN AN ORGANIZED HEALTH CARE EDUCATION/TRAINING PROGRAM

## 2024-10-03 PROCEDURE — 77014 CHG CT GUIDANCE RADIATION THERAPY FLDS PLACEMENT: CPT | Performed by: STUDENT IN AN ORGANIZED HEALTH CARE EDUCATION/TRAINING PROGRAM

## 2024-10-04 ENCOUNTER — APPOINTMENT (OUTPATIENT)
Dept: RADIATION ONCOLOGY | Facility: CLINIC | Age: 69
End: 2024-10-04
Attending: STUDENT IN AN ORGANIZED HEALTH CARE EDUCATION/TRAINING PROGRAM
Payer: COMMERCIAL

## 2024-10-04 ENCOUNTER — APPOINTMENT (OUTPATIENT)
Dept: RADIATION ONCOLOGY | Facility: CLINIC | Age: 69
End: 2024-10-04
Payer: COMMERCIAL

## 2024-10-04 PROCEDURE — 77014 CHG CT GUIDANCE RADIATION THERAPY FLDS PLACEMENT: CPT | Performed by: RADIOLOGY

## 2024-10-04 PROCEDURE — 77386 HB NTSTY MODUL RAD TX DLVR CPLX: CPT | Performed by: RADIOLOGY

## 2024-10-07 ENCOUNTER — APPOINTMENT (OUTPATIENT)
Dept: RADIATION ONCOLOGY | Facility: CLINIC | Age: 69
End: 2024-10-07
Payer: COMMERCIAL

## 2024-10-07 ENCOUNTER — EVALUATION (OUTPATIENT)
Dept: PHYSICAL THERAPY | Facility: CLINIC | Age: 69
End: 2024-10-07
Payer: COMMERCIAL

## 2024-10-07 ENCOUNTER — APPOINTMENT (OUTPATIENT)
Dept: RADIATION ONCOLOGY | Facility: CLINIC | Age: 69
End: 2024-10-07
Attending: STUDENT IN AN ORGANIZED HEALTH CARE EDUCATION/TRAINING PROGRAM
Payer: COMMERCIAL

## 2024-10-07 DIAGNOSIS — G81.94 LEFT HEMIPARESIS (HCC): ICD-10-CM

## 2024-10-07 DIAGNOSIS — R26.2 AMBULATORY DYSFUNCTION: Primary | ICD-10-CM

## 2024-10-07 PROCEDURE — 97116 GAIT TRAINING THERAPY: CPT | Performed by: PHYSICAL THERAPIST

## 2024-10-07 PROCEDURE — 97110 THERAPEUTIC EXERCISES: CPT | Performed by: PHYSICAL THERAPIST

## 2024-10-07 PROCEDURE — 77336 RADIATION PHYSICS CONSULT: CPT | Performed by: STUDENT IN AN ORGANIZED HEALTH CARE EDUCATION/TRAINING PROGRAM

## 2024-10-07 PROCEDURE — 77014 CHG CT GUIDANCE RADIATION THERAPY FLDS PLACEMENT: CPT | Performed by: RADIOLOGY

## 2024-10-07 PROCEDURE — 97112 NEUROMUSCULAR REEDUCATION: CPT | Performed by: PHYSICAL THERAPIST

## 2024-10-07 PROCEDURE — 77386 HB NTSTY MODUL RAD TX DLVR CPLX: CPT | Performed by: RADIOLOGY

## 2024-10-07 NOTE — PROGRESS NOTES
Daily Note     Today's date: 10/7/2024  Patient name: Paras Pitts  : 1955  MRN: [id number]  Referring provider: Addy Craven MD  Dx:   Encounter Diagnosis     ICD-10-CM    1. Ambulatory dysfunction  R26.2       2. Left hemiparesis (HCC)  G81.94              Assessment  Impairments: abnormal coordination, abnormal muscle firing, abnormal or restricted ROM, activity intolerance, impaired physical strength, lacks appropriate home exercise program, pain with function and safety issue  Functional limitations: Difficulty with amb, stair negotiation, decreased activity tolerance  Symptom irritability: moderate    Assessment details: Paras Pitts is a 69 y.o. male who presents to outpatient PT with a  Left hemiparesis (HCC)  Ambulatory dysfunction.  No further referral appears necessary at this time based upon examination results. Pt presents with decreased strength, ROM, balance, functional activity tolerance, and pain with movement in his Left UE and LE  which is  limiting his ability to perform the aforementioned functional activities.  Etiologic factors include repetitive poor body mechanics. Prognosis is good given HEP compliance and PT 2-3/wk.  Please contact me if you have any questions or recommendations.  Thank you for the opportunity to share in  Paras care.     RA 10/7/24  Paras Pitts demonstrated improved 5STS and 6MWT scores and decreased TUG score since starting physical therapy. Reports an overall improvement of 60% so far with treatment. Since beginning physical therapy, patient has also started radiation therapy in the past several weeks. This has effected ability to handle increasing treatment intensity but patient has been able to continue to progress slowly with modifications to plan of care as needed. Continues to present with decreased LE strength effecting ability to ambulate and navigate stairs safely. Patient would benefit from continued skilled physical therapy to increase LE strength  to improve ability to perform daily activities such as ambulating safely.      Understanding of Dx/Px/POC: good     Prognosis: good    Goals  STG to be achieved in 4 weeks     1. Pt will reduce TUG score to 13 seconds indicating reduced risk for falls PROGRESSING  2. Pt will improve 5x sit to stand scores by 5 seconds indicating improved LE strength MET  3. Pt will improve 6 MWT score to 400 feet indicating improved endurance MET     LTG to be achieved in 6-8 weeks  1. Pt will be complaint with HEP MET  2. Pt will improve ROM to WFL, to help facilitate independence with ADL's, IADL's, and functional activities PROGRESSING  3. Pt will improve Strength to WFL to help facilitate independence with ADL's, IADL's, and functional activities PROGRESSING  4. Pt will have no limitations with ambulation to help facilitate independence at home and in the community. PROGRESSING  5. Pt will have no limitations with stair negotiation to help facilitate independence at home and in the community PROGRESSING          Plan  Patient would benefit from: skilled physical therapy    Planned therapy interventions: ADL training, balance, balance/weight bearing training, flexibility, functional ROM exercises, gait training, graded activity, graded exercise, graded motor, home exercise program, joint mobilization, manual therapy, neuromuscular re-education, patient education, postural training, strengthening, stretching, therapeutic activities and therapeutic exercise    Frequency: 2x week  Duration in weeks: 12  Plan of Care beginning date: 8/28/2024  Plan of Care expiration date: 11/28/2024  Treatment plan discussed with: patient, PTA and referring physician        Subjective Evaluation    History of Present Illness  Mechanism of injury: The patient is a 69 year old male who presents to outpatient PT with chief complaint of generalized weakness weakness. Patient has significant hx of lung CA with mets to the brain. Also hx of stroke with  left sided peter paresis. Overall he feels his mobility it ok. He uses RW  for ambulation. He endorses 1 fall in the past 6 months. He recently stopped chemotherapy, ans reports that he will be starting radiation treatments. Goals for PT are to get back to normal    Patient Goals  Patient goals for therapy: increased strength, decreased pain and increased motion  Patient goal: to get back to normal  Pain  Current pain ratin  At best pain ratin  At worst pain ratin  Quality: dull ache        Objective     Active Range of Motion   Left Shoulder   Normal active range of motion    Right Shoulder   Normal active range of motion    Left Elbow   Normal active range of motion    Left Wrist   Normal active range of motion  Left Hip   Normal active range of motion  Left Knee   Normal active range of motion    Strength/Myotome Testing     Left Shoulder     Planes of Motion   Flexion: 3+   Abduction: 3+   Adduction: 3+     Right Shoulder     Planes of Motion   Flexion: 4-   Abduction: 4-   Adduction: 4-     Left Elbow   Flexion: 3+  Forearm supination: 3+    Right Elbow   Flexion: 4-  Forearm supination: 4-    Left Wrist/Hand   Wrist extension: 3+  Wrist flexion: 3+    Right Wrist/Hand   Wrist extension: 4-  Wrist flexion: 4-    Left Hip   Planes of Motion   Flexion: 3+  Abduction: 3+  Adduction: 3+    Right Hip   Planes of Motion   Flexion: 4-  Abduction: 4-  Adduction: 4-    Left Knee   Flexion: 3+  Extension: 3+    Right Knee   Flexion: 4-  Extension: 4-    TUG 16 sec RW   10/7: 26 RW  5x STS 24 seconds   10/7: 19 seconds  6MWT   303 feet RW   10/7: 510 feet RW     Precautions: Active CA, Chemo/radiation hx of stroke       Manuals 10/7 9/5 9/9 9/11 9/16 9/18 9/23 9/25 9/30 10/2                                                       Neuro Re-Ed             Hip add 20x 20x 20 x 20x 20x 20x x20 x20 x20 x20   Hip abd  20x   blue 20x  blue 20 x blue 20x   blue 20x  blue 20x   blue X20  Blue x20  blue x20  blue x20   LAQ  " R 2.5#  X20  Ea  L 0# 2x10 20x  2.5lbs 20 x  2.5#  2.5#  20x 2.5#  20x ea 2.5#  20x ea 2.5#   X20 ea 2.5#  X20 ea 2.5#  X20 ea R 2.5#  X20  Ea  L 0# 2x10   March  R 2.5#   L 0# x20 ea 20x  2.5lbs 20 x  2.5# 2.5#  20x  L standing/R seated 2.5#  20x  L standing/R seated 2.5#  L standing  R seated  X20 ea 2.5% L standing R seated x20 ea 2.5#  Both seated today  L groin pain 2.5#  Both seated today  L groin pain R 2.5#   L 0# x20 ea                                          Ther Ex             HR TR  20x 20x X20  x20 x20 x20 X20  x20 x20 x20   Mini Squat  NV 20x x20 x20 x20 x20 x20 x20 x20 NV   Step Up  See below 20x  2.5lbs  Step A NV 20x  2.5#  Step A   20x  2.5#  Step A Full stairs see below See below See below See below L only  4\" 0# x10   Three way  L only 2.5# 20x ea 20x  2.5lbs  Left X20   2.5#  20x ea  2.5# 20x standing on right  2.5# L only  2.5#  X20 ea L only 2.5#   X20 ea L only 2.5# 20x ea L only 2.5# 20x ea L only 2.5# 20x ea   Nu step   L3 x  10 min L3 x 10 min L2 x10 min L2  X10  min L2  X10 min L2  x10  min L2 x10 min L3 x10 min L3 x10 min L3 x10 min                                          Ther Activity                                       Gait Training              3 laps 3 laps NV 1 lap 2 laps 1 lap 2 laps   1 rest 2 laps to steps 2 laps in clinic to car 2 laps     Step training up/down 4 steps  X3 with gait belt     Step training  X3 up/down Step traininup dpwn 4 steps x2 Step training up/down 4 steps  X2 with gait belt Step ups 4\" 1x7  Step training up/down 4 steps  X2 with gait belt   Modalities                                           Treated by jose j MELLO under direct supervision of Abhijeet Bhatt DPT       Access Code: [email or web address]  URL: [email or web address]  Date: 09/23/2024  Prepared by: Jaclyn Ramos    Exercises  - Seated Hip Adduction Squeeze with Ball  - 1 x daily - 3 x weekly - 2 sets - 10 reps - 5\" hold  - Seated Hip Abduction with Resistance  - 1 x daily - " "3 x weekly - 2 sets - 10 reps - 5\" hold  - Seated Long Arc Quad  - 1 x daily - 3 x weekly - 2 sets - 10 reps - 5\" hold  - Seated Toe Raise  - 1 x daily - 3 x weekly - 2 sets - 10 reps  - Seated March  - 1 x daily - 3 x weekly - 2 sets - 10 reps                   "

## 2024-10-08 ENCOUNTER — APPOINTMENT (OUTPATIENT)
Dept: RADIATION ONCOLOGY | Facility: CLINIC | Age: 69
End: 2024-10-08
Payer: COMMERCIAL

## 2024-10-08 ENCOUNTER — TELEPHONE (OUTPATIENT)
Dept: HEMATOLOGY ONCOLOGY | Facility: CLINIC | Age: 69
End: 2024-10-08

## 2024-10-08 ENCOUNTER — APPOINTMENT (OUTPATIENT)
Dept: RADIATION ONCOLOGY | Facility: CLINIC | Age: 69
End: 2024-10-08
Attending: STUDENT IN AN ORGANIZED HEALTH CARE EDUCATION/TRAINING PROGRAM
Payer: COMMERCIAL

## 2024-10-08 PROCEDURE — 77014 CHG CT GUIDANCE RADIATION THERAPY FLDS PLACEMENT: CPT | Performed by: RADIOLOGY

## 2024-10-08 PROCEDURE — 77386 HB NTSTY MODUL RAD TX DLVR CPLX: CPT | Performed by: RADIOLOGY

## 2024-10-08 PROCEDURE — 77427 RADIATION TX MANAGEMENT X5: CPT | Performed by: STUDENT IN AN ORGANIZED HEALTH CARE EDUCATION/TRAINING PROGRAM

## 2024-10-09 ENCOUNTER — APPOINTMENT (OUTPATIENT)
Dept: RADIATION ONCOLOGY | Facility: CLINIC | Age: 69
End: 2024-10-09
Payer: COMMERCIAL

## 2024-10-09 ENCOUNTER — APPOINTMENT (OUTPATIENT)
Dept: RADIATION ONCOLOGY | Facility: CLINIC | Age: 69
End: 2024-10-09
Attending: STUDENT IN AN ORGANIZED HEALTH CARE EDUCATION/TRAINING PROGRAM
Payer: COMMERCIAL

## 2024-10-09 ENCOUNTER — OFFICE VISIT (OUTPATIENT)
Dept: PHYSICAL THERAPY | Facility: CLINIC | Age: 69
End: 2024-10-09
Payer: COMMERCIAL

## 2024-10-09 DIAGNOSIS — G40.109 FOCAL EPILEPSY ORIGINATING IN FRONTAL LOBE (HCC): ICD-10-CM

## 2024-10-09 DIAGNOSIS — E78.00 HYPERCHOLESTEROLEMIA: ICD-10-CM

## 2024-10-09 DIAGNOSIS — R26.2 AMBULATORY DYSFUNCTION: Primary | ICD-10-CM

## 2024-10-09 DIAGNOSIS — G81.94 LEFT HEMIPARESIS (HCC): ICD-10-CM

## 2024-10-09 PROCEDURE — 97116 GAIT TRAINING THERAPY: CPT

## 2024-10-09 PROCEDURE — 77014 CHG CT GUIDANCE RADIATION THERAPY FLDS PLACEMENT: CPT | Performed by: RADIOLOGY

## 2024-10-09 PROCEDURE — 97112 NEUROMUSCULAR REEDUCATION: CPT

## 2024-10-09 PROCEDURE — 77386 HB NTSTY MODUL RAD TX DLVR CPLX: CPT | Performed by: RADIOLOGY

## 2024-10-09 PROCEDURE — 97110 THERAPEUTIC EXERCISES: CPT

## 2024-10-09 NOTE — PROGRESS NOTES
"Daily Note     Today's date: 10/9/2024  Patient name: Paras Pitts  : 1955  MRN: [id number]  Referring provider: Addy Craven MD  Dx:   Encounter Diagnosis     ICD-10-CM    1. Ambulatory dysfunction  R26.2       2. Left hemiparesis (HCC)  G81.94                      Subjective: Pt continues to c/o L groin pain with weight bearing.       Objective: See treatment diary below      Assessment: Tolerated treatment well. Continues to ambulate in dept and perform stairs first to avoid fatigue.  Improved gait on stairs one step at a time leading with R with B/L handrails, CGA for safety. PREs to patient tolerance to avoid increased L hip/groin pain. Patient demonstrated fatigue post treatment, exhibited good technique with therapeutic exercises, and would benefit from continued PT      Plan: Continue per plan of care.      Precautions: Active CA, Chemo/radiation hx of stroke       Manuals 10/7 10/9 9/9 9/11 9/16 9/18 9/23 9/25 9/30 10/2                                                       Neuro Re-Ed             Hip add 20x 20x 20 x 20x 20x 20x x20 x20 x20 x20   Hip abd  20x   blue 20x  blue 20 x blue 20x   blue 20x  blue 20x   blue X20  Blue x20  blue x20  blue x20   LAQ  R 2.5#  X20  Ea  L 0# 2x10 R 2.5#  X20    L 1.5# 2x10 20 x  2.5#  2.5#  20x 2.5#  20x ea 2.5#  20x ea 2.5#   X20 ea 2.5#  X20 ea 2.5#  X20 ea R 2.5#  X20  Ea  L 0# 2x10   March  R 2.5#   L 0# x20 ea 20x  R 2.5# x20    L 1.5# x20 20 x  2.5# 2.5#  20x  L standing/R seated 2.5#  20x  L standing/R seated 2.5#  L standing  R seated  X20 ea 2.5% L standing R seated x20 ea 2.5#  Both seated today  L groin pain 2.5#  Both seated today  L groin pain R 2.5#   L 0# x20 ea                                          Ther Ex             HR TR  20x 20x X20  x20 x20 x20 X20  x20 x20 x20   Mini Squat  NV 20x x20 x20 x20 x20 x20 x20 x20 NV   Step Up  See below L only 4\" step x7 NV 20x  2.5#  Step A   20x  2.5#  Step A Full stairs see below See below See below See " "below L only  4\" 0# x10   Three way  L only 2.5# 20x ea L only 1.5# x20  X20   2.5#  20x ea  2.5# 20x standing on right  2.5# L only  2.5#  X20 ea L only 2.5#   X20 ea L only 2.5# 20x ea L only 2.5# 20x ea L only 2.5# 20x ea   Nu step   L3 x  10 min L3 x 10 min L2 x10 min L2  X10  min L2  X10 min L2  x10  min L2 x10 min L3 x10 min L3 x10 min L3 x10 min                                          Ther Activity                                       Gait Training              3 laps 3 laps NV 1 lap 2 laps 1 lap 2 laps   1 rest 2 laps to steps 2 laps in clinic to car 2 laps     Step training up/down 4 steps  X3 with gait belt Step training up/down 4 steps  X3 with gait belt    Step training  X3 up/down Step traininup dpwn 4 steps x2 Step training up/down 4 steps  X2 with gait belt Step ups 4\" 1x7  Step training up/down 4 steps  X2 with gait belt   Modalities                                           Treated by jose j MELLO under direct supervision of Abhijeet Bhatt DPT       Access Code: [email or web address]  URL: [email or web address]  Date: 09/23/2024  Prepared by: Jaclyn Ramos    Exercises  - Seated Hip Adduction Squeeze with Ball  - 1 x daily - 3 x weekly - 2 sets - 10 reps - 5\" hold  - Seated Hip Abduction with Resistance  - 1 x daily - 3 x weekly - 2 sets - 10 reps - 5\" hold  - Seated Long Arc Quad  - 1 x daily - 3 x weekly - 2 sets - 10 reps - 5\" hold  - Seated Toe Raise  - 1 x daily - 3 x weekly - 2 sets - 10 reps  - Seated March  - 1 x daily - 3 x weekly - 2 sets - 10 reps                     "

## 2024-10-10 ENCOUNTER — APPOINTMENT (OUTPATIENT)
Dept: RADIATION ONCOLOGY | Facility: CLINIC | Age: 69
End: 2024-10-10
Attending: STUDENT IN AN ORGANIZED HEALTH CARE EDUCATION/TRAINING PROGRAM
Payer: COMMERCIAL

## 2024-10-10 ENCOUNTER — APPOINTMENT (OUTPATIENT)
Dept: RADIATION ONCOLOGY | Facility: CLINIC | Age: 69
End: 2024-10-10
Payer: COMMERCIAL

## 2024-10-10 ENCOUNTER — HOSPITAL ENCOUNTER (OUTPATIENT)
Dept: INFUSION CENTER | Facility: HOSPITAL | Age: 69
Discharge: HOME | End: 2024-10-10
Attending: INTERNAL MEDICINE

## 2024-10-10 PROCEDURE — 77014 CHG CT GUIDANCE RADIATION THERAPY FLDS PLACEMENT: CPT | Performed by: RADIOLOGY

## 2024-10-10 PROCEDURE — 77386 HB NTSTY MODUL RAD TX DLVR CPLX: CPT | Performed by: RADIOLOGY

## 2024-10-10 RX ORDER — ATORVASTATIN CALCIUM 40 MG/1
40 TABLET, FILM COATED ORAL
Qty: 30 TABLET | Refills: 0 | Status: SHIPPED | OUTPATIENT
Start: 2024-10-10

## 2024-10-10 RX ORDER — LACOSAMIDE 200 MG/1
200 TABLET ORAL EVERY 12 HOURS SCHEDULED
Qty: 180 TABLET | Refills: 1 | Status: SHIPPED | OUTPATIENT
Start: 2024-10-10 | End: 2025-04-08

## 2024-10-10 NOTE — TELEPHONE ENCOUNTER
Patient Id Prescription # Filled Written Drug Label Qty Days Strength MME** Prescriber Pharmacy Payment REFILL #/Auth State Detail  1 [id number] 09/04/2024 09/04/2024 clonazePAM (Tablet) 5.0 5 1 MG NA [facility] Commercial Insurance 0 / 0 [place]   1 [id number] 09/04/2024 09/04/2024 Lacosamide (Tablet) 60.0 30 200 MG NA [facility] Commercial Insurance 0 / 5 [place]   1 [id number] 05/24/2024 05/23/2024 cloBAZam (Tablet) 10.0 10 10 MG NA [facility] Commercial Insurance 0 / 0 [place]   1 [id number] 05/24/2024 05/23/2024 Lacosamide (Tablet) 20.0 10 200 MG NA [facility] Commercial Insurance 0 / 0 [place]   1 [id number] 10/18/2023 10/18/2023 oxyCODONE HCL (Tablet) 20.0 5 5 MG 30.0 [facility] Commercial Insurance 0 / 0 [place]

## 2024-10-11 ENCOUNTER — APPOINTMENT (OUTPATIENT)
Dept: RADIATION ONCOLOGY | Facility: CLINIC | Age: 69
End: 2024-10-11
Attending: STUDENT IN AN ORGANIZED HEALTH CARE EDUCATION/TRAINING PROGRAM
Payer: COMMERCIAL

## 2024-10-11 ENCOUNTER — HOSPITAL ENCOUNTER (OUTPATIENT)
Dept: INFUSION CENTER | Facility: HOSPITAL | Age: 69
End: 2024-10-11
Payer: COMMERCIAL

## 2024-10-11 ENCOUNTER — APPOINTMENT (OUTPATIENT)
Dept: RADIATION ONCOLOGY | Facility: CLINIC | Age: 69
End: 2024-10-11
Payer: COMMERCIAL

## 2024-10-11 VITALS — TEMPERATURE: 97.7 F

## 2024-10-11 DIAGNOSIS — Z45.2 ENCOUNTER FOR CENTRAL LINE CARE: ICD-10-CM

## 2024-10-11 DIAGNOSIS — T45.1X5A CHEMOTHERAPY-INDUCED NEUTROPENIA (HCC): ICD-10-CM

## 2024-10-11 DIAGNOSIS — C34.91 CARCINOMA OF RIGHT LUNG (HCC): ICD-10-CM

## 2024-10-11 DIAGNOSIS — C79.9 METASTATIC ADENOCARCINOMA (HCC): Primary | ICD-10-CM

## 2024-10-11 DIAGNOSIS — D70.1 CHEMOTHERAPY-INDUCED NEUTROPENIA (HCC): ICD-10-CM

## 2024-10-11 LAB
ALBUMIN SERPL BCG-MCNC: 4.4 G/DL (ref 3.5–5)
ALP SERPL-CCNC: 60 U/L (ref 34–104)
ALT SERPL W P-5'-P-CCNC: 11 U/L (ref 7–52)
ANION GAP SERPL CALCULATED.3IONS-SCNC: 9 MMOL/L (ref 4–13)
AST SERPL W P-5'-P-CCNC: 18 U/L (ref 13–39)
BASOPHILS # BLD AUTO: 0.04 THOUSANDS/ΜL (ref 0–0.1)
BASOPHILS NFR BLD AUTO: 1 % (ref 0–1)
BILIRUB SERPL-MCNC: 0.49 MG/DL (ref 0.2–1)
BUN SERPL-MCNC: 13 MG/DL (ref 5–25)
CALCIUM SERPL-MCNC: 9.2 MG/DL (ref 8.4–10.2)
CHLORIDE SERPL-SCNC: 106 MMOL/L (ref 96–108)
CO2 SERPL-SCNC: 28 MMOL/L (ref 21–32)
CREAT SERPL-MCNC: 0.61 MG/DL (ref 0.6–1.3)
EOSINOPHIL # BLD AUTO: 0.25 THOUSAND/ΜL (ref 0–0.61)
EOSINOPHIL NFR BLD AUTO: 4 % (ref 0–6)
ERYTHROCYTE [DISTWIDTH] IN BLOOD BY AUTOMATED COUNT: 13.3 % (ref 11.6–15.1)
GFR SERPL CREATININE-BSD FRML MDRD: 100 ML/MIN/1.73SQ M
GLUCOSE SERPL-MCNC: 118 MG/DL (ref 65–140)
HCT VFR BLD AUTO: 40.2 % (ref 36.5–49.3)
HGB BLD-MCNC: 12.7 G/DL (ref 12–17)
IMM GRANULOCYTES # BLD AUTO: 0.03 THOUSAND/UL (ref 0–0.2)
IMM GRANULOCYTES NFR BLD AUTO: 1 % (ref 0–2)
LYMPHOCYTES # BLD AUTO: 1.2 THOUSANDS/ΜL (ref 0.6–4.47)
LYMPHOCYTES NFR BLD AUTO: 20 % (ref 14–44)
MAGNESIUM SERPL-MCNC: 1.8 MG/DL (ref 1.9–2.7)
MCH RBC QN AUTO: 28.7 PG (ref 26.8–34.3)
MCHC RBC AUTO-ENTMCNC: 31.6 G/DL (ref 31.4–37.4)
MCV RBC AUTO: 91 FL (ref 82–98)
MONOCYTES # BLD AUTO: 0.49 THOUSAND/ΜL (ref 0.17–1.22)
MONOCYTES NFR BLD AUTO: 8 % (ref 4–12)
NEUTROPHILS # BLD AUTO: 4.08 THOUSANDS/ΜL (ref 1.85–7.62)
NEUTS SEG NFR BLD AUTO: 66 % (ref 43–75)
NRBC BLD AUTO-RTO: 0 /100 WBCS
PLATELET # BLD AUTO: 204 THOUSANDS/UL (ref 149–390)
PMV BLD AUTO: 9.6 FL (ref 8.9–12.7)
POTASSIUM SERPL-SCNC: 3.6 MMOL/L (ref 3.5–5.3)
PROT SERPL-MCNC: 7 G/DL (ref 6.4–8.4)
RBC # BLD AUTO: 4.42 MILLION/UL (ref 3.88–5.62)
SODIUM SERPL-SCNC: 143 MMOL/L (ref 135–147)
WBC # BLD AUTO: 6.09 THOUSAND/UL (ref 4.31–10.16)

## 2024-10-11 PROCEDURE — 85025 COMPLETE CBC W/AUTO DIFF WBC: CPT | Performed by: INTERNAL MEDICINE

## 2024-10-11 PROCEDURE — 83735 ASSAY OF MAGNESIUM: CPT | Performed by: INTERNAL MEDICINE

## 2024-10-11 PROCEDURE — 77386 HB NTSTY MODUL RAD TX DLVR CPLX: CPT | Performed by: RADIOLOGY

## 2024-10-11 PROCEDURE — 77014 CHG CT GUIDANCE RADIATION THERAPY FLDS PLACEMENT: CPT | Performed by: RADIOLOGY

## 2024-10-11 PROCEDURE — 80053 COMPREHEN METABOLIC PANEL: CPT | Performed by: INTERNAL MEDICINE

## 2024-10-11 NOTE — PROGRESS NOTES
Port flushed freely.  Good blood return noted.  Central labs drawn per orders.  Port deaccessed without issue.  Patient tolerated well.  Patient's daughter will call after 10/16/24 Dr. Langford appointment with what patient's treatment plan will be.    Paras Pitts is aware of future appt on 11/22/24 at 1230.     AVS printed and given to Paras Pitts:  Yes.      Patient ambulated off unit without incident.  All personal belongings taken with patient.

## 2024-10-14 ENCOUNTER — APPOINTMENT (OUTPATIENT)
Dept: RADIATION ONCOLOGY | Facility: CLINIC | Age: 69
End: 2024-10-14
Payer: COMMERCIAL

## 2024-10-14 ENCOUNTER — OFFICE VISIT (OUTPATIENT)
Dept: PHYSICAL THERAPY | Facility: CLINIC | Age: 69
End: 2024-10-14
Payer: COMMERCIAL

## 2024-10-14 ENCOUNTER — APPOINTMENT (OUTPATIENT)
Dept: RADIATION ONCOLOGY | Facility: CLINIC | Age: 69
End: 2024-10-14
Attending: STUDENT IN AN ORGANIZED HEALTH CARE EDUCATION/TRAINING PROGRAM
Payer: COMMERCIAL

## 2024-10-14 DIAGNOSIS — G81.94 LEFT HEMIPARESIS (HCC): ICD-10-CM

## 2024-10-14 DIAGNOSIS — R26.2 AMBULATORY DYSFUNCTION: Primary | ICD-10-CM

## 2024-10-14 PROCEDURE — 97112 NEUROMUSCULAR REEDUCATION: CPT

## 2024-10-14 PROCEDURE — 77336 RADIATION PHYSICS CONSULT: CPT | Performed by: STUDENT IN AN ORGANIZED HEALTH CARE EDUCATION/TRAINING PROGRAM

## 2024-10-14 PROCEDURE — 77386 HB NTSTY MODUL RAD TX DLVR CPLX: CPT | Performed by: RADIOLOGY

## 2024-10-14 PROCEDURE — 97110 THERAPEUTIC EXERCISES: CPT

## 2024-10-14 PROCEDURE — 97116 GAIT TRAINING THERAPY: CPT

## 2024-10-14 PROCEDURE — 77014 CHG CT GUIDANCE RADIATION THERAPY FLDS PLACEMENT: CPT | Performed by: RADIOLOGY

## 2024-10-14 NOTE — PROGRESS NOTES
Daily Note     Today's date: 10/14/2024  Patient name: Paras Pitts  : 1955  MRN: [id number]  Referring provider: Addy Craven MD  Dx:   Encounter Diagnosis     ICD-10-CM    1. Ambulatory dysfunction  R26.2       2. Left hemiparesis (HCC)  G81.94                      Subjective: Pt reports L groin still sore, yessica with standing on L leg. Made appt with PCP.  Did report minimal improvement following last treatment using  resistance with exercise. States he does notice he is getting around better.       Objective: See treatment diary below      Assessment: Tolerated treatment well. Performed all exercise on L without resistance today. Pt tolerated standing L hip flexion to 90 deg with knee bent without significant c/o. Pain with seated L hip flexion. Improved ambulation on stairs with CGA using B HRs one step at a time leading with R LE up, L LE down. Patient demonstrated fatigue post treatment, exhibited good technique with therapeutic exercises, and would benefit from continued PT      Plan: Continue per plan of care.      Precautions: Active CA, Chemo/radiation hx of stroke       Manuals 10/7 10/9 10/14 9/11 9/16 9/18 9/23 9/25 9/30 10/2                                                       Neuro Re-Ed             Hip add 20x 20x 20 x 20x 20x 20x x20 x20 x20 x20   Hip abd  20x   blue 20x  blue 20 x blue 20x   blue 20x  blue 20x   blue X20  Blue x20  blue x20  blue x20   LAQ  R 2.5#  X20  Ea  L 0# 2x10 R 2.5#  X20    L 1.5# 2x10 20 x  R 2.5#   0# L 2.5#  20x 2.5#  20x ea 2.5#  20x ea 2.5#   X20 ea 2.5#  X20 ea 2.5#  X20 ea R 2.5#  X20  Ea  L 0# 2x10   March  R 2.5#   L 0# x20 ea 20x  R 2.5# x20    L 1.5# x20 0 # R LE seated 2.5#  20x  L standing/R seated 2.5#  20x  L standing/R seated 2.5#  L standing  R seated  X20 ea 2.5% L standing R seated x20 ea 2.5#  Both seated today  L groin pain 2.5#  Both seated today  L groin pain R 2.5#   L 0# x20 ea                                          Ther Ex        "      HR TR  20x 20x X20  x20 x20 x20 X20  x20 x20 x20   Mini Squat  NV 20x Sit to stand x20 x20 x20 x20 x20 x20 x20 NV   Step Up  See below L only 4\" step x7  20x  2.5#  Step A   20x  2.5#  Step A Full stairs see below See below See below See below L only  4\" 0# x10   Three way  L only 2.5# 20x ea L only 1.5# x20  X20   0# 20x ea  2.5# 20x standing on right  2.5# L only  2.5#  X20 ea L only 2.5#   X20 ea L only 2.5# 20x ea L only 2.5# 20x ea L only 2.5# 20x ea   Nu step   L3 x  10 min L3 x 10 min L3x10 min L2  X10  min L2  X10 min L2  x10  min L2 x10 min L3 x10 min L3 x10 min L3 x10 min                                          Ther Activity                                       Gait Training              3 laps 3 laps NV 1 lap 2 laps 1 lap 2 laps   1 rest 2 laps to steps 2 laps in clinic to car 2 laps     Step training up/down 4 steps  X3 with gait belt Step training up/down 4 steps  X3 with gait belt Step training up/down 4 steps  X3 with gait belt   Step training  X3 up/down Step traininup dpwn 4 steps x2 Step training up/down 4 steps  X2 with gait belt Step ups 4\" 1x7  Step training up/down 4 steps  X2 with gait belt   Modalities                                           Treated by jose j MELLO under direct supervision of Abhijeet Bhatt DPT       Access Code: [email or web address]  URL: [email or web address]  Date: 09/23/2024  Prepared by: Jaclyn Ramos    Exercises  - Seated Hip Adduction Squeeze with Ball  - 1 x daily - 3 x weekly - 2 sets - 10 reps - 5\" hold  - Seated Hip Abduction with Resistance  - 1 x daily - 3 x weekly - 2 sets - 10 reps - 5\" hold  - Seated Long Arc Quad  - 1 x daily - 3 x weekly - 2 sets - 10 reps - 5\" hold  - Seated Toe Raise  - 1 x daily - 3 x weekly - 2 sets - 10 reps  - Seated March  - 1 x daily - 3 x weekly - 2 sets - 10 reps                       " Criticial

## 2024-10-15 ENCOUNTER — APPOINTMENT (OUTPATIENT)
Dept: RADIATION ONCOLOGY | Facility: CLINIC | Age: 69
End: 2024-10-15
Payer: COMMERCIAL

## 2024-10-15 ENCOUNTER — APPOINTMENT (OUTPATIENT)
Dept: RADIATION ONCOLOGY | Facility: CLINIC | Age: 69
End: 2024-10-15
Attending: STUDENT IN AN ORGANIZED HEALTH CARE EDUCATION/TRAINING PROGRAM
Payer: COMMERCIAL

## 2024-10-15 PROCEDURE — 77386 HB NTSTY MODUL RAD TX DLVR CPLX: CPT | Performed by: RADIOLOGY

## 2024-10-15 PROCEDURE — 77014 CHG CT GUIDANCE RADIATION THERAPY FLDS PLACEMENT: CPT | Performed by: RADIOLOGY

## 2024-10-16 ENCOUNTER — APPOINTMENT (OUTPATIENT)
Dept: RADIATION ONCOLOGY | Facility: CLINIC | Age: 69
End: 2024-10-16
Attending: STUDENT IN AN ORGANIZED HEALTH CARE EDUCATION/TRAINING PROGRAM
Payer: COMMERCIAL

## 2024-10-16 ENCOUNTER — APPOINTMENT (OUTPATIENT)
Dept: PHYSICAL THERAPY | Facility: CLINIC | Age: 69
End: 2024-10-16
Payer: COMMERCIAL

## 2024-10-16 ENCOUNTER — OFFICE VISIT (OUTPATIENT)
Dept: HEMATOLOGY ONCOLOGY | Facility: CLINIC | Age: 69
End: 2024-10-16
Payer: COMMERCIAL

## 2024-10-16 ENCOUNTER — APPOINTMENT (OUTPATIENT)
Dept: RADIATION ONCOLOGY | Facility: CLINIC | Age: 69
End: 2024-10-16
Payer: COMMERCIAL

## 2024-10-16 VITALS
HEART RATE: 81 BPM | BODY MASS INDEX: 29.03 KG/M2 | SYSTOLIC BLOOD PRESSURE: 124 MMHG | WEIGHT: 196 LBS | RESPIRATION RATE: 18 BRPM | TEMPERATURE: 98.2 F | DIASTOLIC BLOOD PRESSURE: 74 MMHG | HEIGHT: 69 IN | OXYGEN SATURATION: 94 %

## 2024-10-16 DIAGNOSIS — C79.31 METASTASIS TO BRAIN (HCC): ICD-10-CM

## 2024-10-16 DIAGNOSIS — C34.91 CARCINOMA OF RIGHT LUNG (HCC): Primary | ICD-10-CM

## 2024-10-16 PROCEDURE — 77014 CHG CT GUIDANCE RADIATION THERAPY FLDS PLACEMENT: CPT | Performed by: RADIOLOGY

## 2024-10-16 PROCEDURE — 99214 OFFICE O/P EST MOD 30 MIN: CPT | Performed by: INTERNAL MEDICINE

## 2024-10-16 PROCEDURE — 77386 HB NTSTY MODUL RAD TX DLVR CPLX: CPT | Performed by: RADIOLOGY

## 2024-10-16 NOTE — PROGRESS NOTES
Hematology/Oncology Outpatient Follow-up  Paras Pitts 69 y.o. male 1955 [id number]    Date:  10/16/2024        Assessment and Plan:  1. Carcinoma of right lung (HCC)  Non-small cell lung cancer favoring adenocarcinoma, clinical stage JAY at diagnosis (cT3, cN1, cM1b) S/P surgical resection of the oligometastatic brain lesion followed by SRS.  Status post 4 cycles of neoadjuvant chemotherapy carboplatin, Alimta and nivolumab 7/2023.    Status post right upper lobe lobectomy on 9/1/2023. The final pathology was ypT2b, pN2, G3.  R0.     Started on adjuvant immunotherapy with nivolumab 480 mg on every 4-week basis on 10/13/2023.  PET scan in March 2024 showed mediastinal adenopathy compatible with recurrence. This was confirmed with bronchoscopy and EBUS guided biopsy on 4/16/2024 which showed metastatic disease in the 4R, 2R lymph node area.      His case was discussed with the radiation oncology team. The decision was made to pursue 4 doses of Avastin to decrease the vasogenic edema in the brain since he was felt to have residual/recurrent disease in the brain according to the MRI from April 2024.   Instead of concurrent chemoradiation the decision was made to get him started on single agent Taxotere.  The patient received a total of 4 cycles of Taxotere followed by a PET scan on 8/5/2024 which showed persistent hypermetabolic right paratracheal mediastinal tri metastatic disease which seems to be stable with mild improvement in size but increased in activity.  The mild splenomegaly and increased diffuse bone marrow activity is most likely reactive.    The patient received 5 cycles of adjusted dose Taxotere 60 mg/m².  Last treatment was given on 8/8/2024.  The patient at this point is getting radiation to the hypermetabolic area of the right upper lobe/mediastinum which will be completed on 10/21/2024.  We did discuss giving the patient a break from treatment after the completion of the consolidative radiation  treatment since he continues to have relatively poor performance status.  We did discuss getting a PET CT scan in about 3 months from now for close follow-up and then we will act accordingly.    - NM PET CT skull base to mid thigh; Future  - CBC and differential; Future  - Comprehensive metabolic panel; Future  - Magnesium; Future    2. Metastasis to brain (HCC)  He is status post surgical resection of the oligometastatic Brain lesion followed by postoperative RT with SRS/SRT in 5 fractions to avoid local recurrence.    Brain MRI from April 2024 showed residual or recurrence of his disease.  He did receive 4 doses of Avastin to decrease the enhancement or vasogenic edema.  MRI of the brain on 9/4/2024 showed improved edema in the parasagittal right frontal lobe resected mass area.  No hint of new lesions was reported.      HPI:  The patient came today for follow-up visit accompanied by his wife.  He stated that he continues to get radiation treatment to the chest which will be completed on 10/21/2024.  He continues to have relatively low performance status.  Blood work on 10/11/2024 showed hemoglobin of 12.7 with normal white cells and platelets.  CMP was entirely normal.  Magnesium 1.8.  Oncology History   Metastatic adenocarcinoma (HCC)   2023 Initial Diagnosis    Metastatic adenocarcinoma (HCC)     3/23/2023 Biopsy    Brain, right frontal mass (biopsy):  - Metastatic non-small cell carcinoma     Comment:  - Tumor cells stain diffusely for CAM5.2, CKAE1/3, CK7, TTF1 and NapsinA with absent CK20, p63, CK5/6, p40 expression.  This immunopanel favors a metastatic lung adenocarcinoma.       5/18/2023 - 7/20/2023 Chemotherapy    cyanocobalamin, 1,000 mcg, Intramuscular, Once, 3 of 3 cycles  Administration: 1,000 mcg (6/29/2023), 1,000 mcg (5/18/2023), 1,000 mcg (7/20/2023)  alteplase (CATHFLO), 2 mg, Intracatheter, Every 1 Minute as needed, 4 of 4 cycles  pegfilgrastim (NEULASTA ONPRO), 6 mg, Subcutaneous, Once, 3 of 3  cycles  Administration: 6 mg (6/8/2023), 6 mg (6/29/2023), 6 mg (7/20/2023)  fosaprepitant (EMEND) IVPB, 150 mg, Intravenous, Once, 4 of 4 cycles  Administration: 150 mg (5/18/2023), 150 mg (6/29/2023), 150 mg (7/20/2023), 150 mg (6/8/2023)  nivolumab (OPDIVO) IVPB, 360 mg (150 % of original dose 240 mg), Intravenous, Once, 4 of 4 cycles  Dose modification: 360 mg (original dose 240 mg, Cycle 1, Reason: Dose modified as per discussion with consulting physician)  Administration: 360 mg (5/18/2023), 360 mg (6/8/2023), 360 mg (6/29/2023), 360 mg (7/20/2023)  CARBOplatin (PARAPLATIN) IVPB ([facility] AUC DOSING), 642.5 mg, Intravenous, Once, 4 of 4 cycles  Administration: 642.5 mg (5/18/2023), 642.5 mg (6/29/2023), 566.5 mg (7/20/2023), 650 mg (6/8/2023)  pemetrexed (ALIMTA) chemo infusion, 980 mg, Intravenous, Once, 4 of 4 cycles  Administration: 1,000 mg (5/18/2023), 1,000 mg (6/29/2023), 1,000 mg (7/20/2023), 1,000 mg (6/8/2023)     10/11/2023 - 10/11/2023 Chemotherapy    alteplase (CATHFLO), 2 mg, Intracatheter, Every 1 Minute as needed, 0 of 6 cycles  nivolumab (OPDIVO) IVPB, 240 mg, Intravenous, Once, 0 of 6 cycles     10/13/2023 - 2/23/2024 Chemotherapy    alteplase (CATHFLO), 2 mg, Intracatheter, Every 1 Minute as needed, 5 of 8 cycles  nivolumab (OPDIVO) IVPB, 480 mg, Intravenous, Once, 5 of 8 cycles  Administration: 480 mg (10/13/2023), 480 mg (11/10/2023), 480 mg (12/8/2023), 480 mg (1/5/2024), 480 mg (2/23/2024)     4/16/2024 Biopsy    ENDOBRONCHIAL ULTRASOUND (EBUS)     A-C. Lymph Node, Level 4R (ThinPrep, smear and cell block preparations):    - Metastatic non-small cell carcinoma, most compatible with lung primary; see note.    - Satisfactory for evaluation.     D-F. Lymph Node, Level 2R (ThinPrep, smear and cell block preparations):    - Metastatic non-small cell carcinoma.    - Non-small cell carcinoma, favor adenocarcinoma.    - Satisfactory for evaluation.     G. Lymph Node, Level 11R:    - Atypical cellular  changes seen.    - Rare atypical epithelioid cells in a background of abundant benign bronchial cells.    - Lymphocytes present, compatible with adequate lymph node sampling.     5/15/2024 -  Chemotherapy    alteplase (CATHFLO), 2 mg, Intracatheter, Every 1 Minute as needed, 5 of 10 cycles  pegfilgrastim (NEULASTA), 6 mg, Subcutaneous, Once, 1 of 1 cycle  Administration: 6 mg (6/28/2024)  pegfilgrastim (NEULASTA ONPRO), 6 mg, Subcutaneous, Once, 5 of 10 cycles  Administration: 6 mg (5/15/2024), 6 mg (6/27/2024), 6 mg (6/6/2024), 6 mg (7/18/2024), 6 mg (8/8/2024)  bevacizumab (AVASTIN) IVPB, 5 mg/kg = 457.5 mg (100 % of original dose 5 mg/kg), Intravenous, Once, 3 of 3 cycles  Dose modification: 5 mg/kg (original dose 5 mg/kg, Cycle 1), 5 mg/kg (original dose 5 mg/kg, Cycle 1), 5 mg/kg (original dose 5 mg/kg, Cycle 2)  Administration: 457.5 mg (5/15/2024), 457.5 mg (5/30/2024), 457.5 mg (6/13/2024), 457.5 mg (6/27/2024)  DOCEtaxel (TAXOTERE) chemo infusion, 75 mg/m2 = 155.2 mg, Intravenous, Once, 5 of 10 cycles  Dose modification: 60 mg/m2 (original dose 75 mg/m2, Cycle 6, Reason: Dose Not Tolerated)  Administration: 155.2 mg (5/15/2024), 155.2 mg (6/27/2024), 155.2 mg (6/6/2024), 155.2 mg (7/18/2024), 155.2 mg (8/8/2024)     Carcinoma of right lung (HCC)   4/13/2023 Initial Diagnosis    Carcinoma of right lung (HCC)     4/13/2023 -  Cancer Staged    Staging form: Lung, AJCC 8th Edition  - Clinical: Stage JAY (cT3, cN1, cM1b) - Signed by James Contreras MD on 4/13/2023 4/19/2023 - 4/28/2023 Radiation    Plan ID Energy Fractions Dose per Fraction (cGy) Dose Correction (cGy) Total Dose Delivered (cGy) Elapsed Days   SRT R Frontal 6X-FFF 5 / 5 600 0 3,000 9         5/18/2023 - 7/20/2023 Chemotherapy    cyanocobalamin, 1,000 mcg, Intramuscular, Once, 3 of 3 cycles  Administration: 1,000 mcg (6/29/2023), 1,000 mcg (5/18/2023), 1,000 mcg (7/20/2023)  alteplase (CATHFLO), 2 mg, Intracatheter, Every 1 Minute as  needed, 4 of 4 cycles  pegfilgrastim (NEULASTA ONPRO), 6 mg, Subcutaneous, Once, 3 of 3 cycles  Administration: 6 mg (6/8/2023), 6 mg (6/29/2023), 6 mg (7/20/2023)  fosaprepitant (EMEND) IVPB, 150 mg, Intravenous, Once, 4 of 4 cycles  Administration: 150 mg (5/18/2023), 150 mg (6/29/2023), 150 mg (7/20/2023), 150 mg (6/8/2023)  nivolumab (OPDIVO) IVPB, 360 mg (150 % of original dose 240 mg), Intravenous, Once, 4 of 4 cycles  Dose modification: 360 mg (original dose 240 mg, Cycle 1, Reason: Dose modified as per discussion with consulting physician)  Administration: 360 mg (5/18/2023), 360 mg (6/8/2023), 360 mg (6/29/2023), 360 mg (7/20/2023)  CARBOplatin (PARAPLATIN) IVPB ([facility] AUC DOSING), 642.5 mg, Intravenous, Once, 4 of 4 cycles  Administration: 642.5 mg (5/18/2023), 642.5 mg (6/29/2023), 566.5 mg (7/20/2023), 650 mg (6/8/2023)  pemetrexed (ALIMTA) chemo infusion, 980 mg, Intravenous, Once, 4 of 4 cycles  Administration: 1,000 mg (5/18/2023), 1,000 mg (6/29/2023), 1,000 mg (7/20/2023), 1,000 mg (6/8/2023)     9/1/2023 -  Cancer Staged    Staging form: Lung, AJCC 8th Edition  - Pathologic stage from 9/1/2023: Stage JAY (pT2b, pN2, cM1b) - Signed by Treva Bah PA-C on 9/20/2023  Histopathologic type: Adenocarcinoma, NOS  Stage prefix: Initial diagnosis  Histologic grade (G): G3  Histologic grading system: 4 grade system       9/1/2023 Surgery    Right robotic converted to open upper lobectomy      9/1/2023 Biopsy    A.  Lung, right upper lobe:     - Invasive poorly differentiated solid adenocarcinoma of the lung, 4.4 cm.     - Tumor invades into, but not through, the visceral pleura (PL1), confirmed by special VVG stains.     - Lymphatic channel invasion by tumor identified.     - Metastatic carcinoma present in three of thirteen lymph nodes (3/13).       -- Rare fibrotic granulomas present.     - All margins are negative for tumor.     - Emphysematous changes.     B.  Lymph node, level 8:     - Negative for  malignancy (0/1).     C.  Lymph node, level 7, #1:     - Negative for malignancy (0/1).     D.  Lymph node, level 4R:     - Negative for malignancy (0/1).     E.  Lymph node, level 4R, #2:     - Fibrovascular adipose tissue; negative for malignancy.     - No lymphoid tissue identified.     F.  Lymph node, level 2R, #1:     - Metastatic carcinoma present in one of three lymph nodes (1/3).     G.  Lymph node, level 2R, #2:     - Negative for malignancy (0/1).     H.  Lymph node, level 11 posterior:     - Single lymph node positive for metastatic carcinoma (1/1).     I.  Lymph node, portion of level 12 on artery:     - Negative for malignancy (0/1).     - Non-caseating granulomatous inflammation present.        -- Special stains for acid fast bacilli and fungal organisms are negative.        10/11/2023 - 10/11/2023 Chemotherapy    alteplase (CATHFLO), 2 mg, Intracatheter, Every 1 Minute as needed, 0 of 6 cycles  nivolumab (OPDIVO) IVPB, 240 mg, Intravenous, Once, 0 of 6 cycles     10/13/2023 - 2/23/2024 Chemotherapy    alteplase (CATHFLO), 2 mg, Intracatheter, Every 1 Minute as needed, 5 of 8 cycles  nivolumab (OPDIVO) IVPB, 480 mg, Intravenous, Once, 5 of 8 cycles  Administration: 480 mg (10/13/2023), 480 mg (11/10/2023), 480 mg (12/8/2023), 480 mg (1/5/2024), 480 mg (2/23/2024)     4/16/2024 Biopsy    ENDOBRONCHIAL ULTRASOUND (EBUS)     A-C. Lymph Node, Level 4R (ThinPrep, smear and cell block preparations):    - Metastatic non-small cell carcinoma, most compatible with lung primary; see note.    - Satisfactory for evaluation.     D-F. Lymph Node, Level 2R (ThinPrep, smear and cell block preparations):    - Metastatic non-small cell carcinoma.    - Non-small cell carcinoma, favor adenocarcinoma.    - Satisfactory for evaluation.     G. Lymph Node, Level 11R:    - Atypical cellular changes seen.    - Rare atypical epithelioid cells in a background of abundant benign bronchial cells.    - Lymphocytes present,  compatible with adequate lymph node sampling.     5/15/2024 -  Chemotherapy    alteplase (CATHFLO), 2 mg, Intracatheter, Every 1 Minute as needed, 5 of 10 cycles  pegfilgrastim (NEULASTA), 6 mg, Subcutaneous, Once, 1 of 1 cycle  Administration: 6 mg (6/28/2024)  pegfilgrastim (NEULASTA ONPRO), 6 mg, Subcutaneous, Once, 5 of 10 cycles  Administration: 6 mg (5/15/2024), 6 mg (6/27/2024), 6 mg (6/6/2024), 6 mg (7/18/2024), 6 mg (8/8/2024)  bevacizumab (AVASTIN) IVPB, 5 mg/kg = 457.5 mg (100 % of original dose 5 mg/kg), Intravenous, Once, 3 of 3 cycles  Dose modification: 5 mg/kg (original dose 5 mg/kg, Cycle 1), 5 mg/kg (original dose 5 mg/kg, Cycle 1), 5 mg/kg (original dose 5 mg/kg, Cycle 2)  Administration: 457.5 mg (5/15/2024), 457.5 mg (5/30/2024), 457.5 mg (6/13/2024), 457.5 mg (6/27/2024)  DOCEtaxel (TAXOTERE) chemo infusion, 75 mg/m2 = 155.2 mg, Intravenous, Once, 5 of 10 cycles  Dose modification: 60 mg/m2 (original dose 75 mg/m2, Cycle 6, Reason: Dose Not Tolerated)  Administration: 155.2 mg (5/15/2024), 155.2 mg (6/27/2024), 155.2 mg (6/6/2024), 155.2 mg (7/18/2024), 155.2 mg (8/8/2024)         Interval history:    ROS: Review of Systems   Constitutional:  Positive for appetite change and fatigue. Negative for chills and fever.   HENT:  Positive for hearing loss. Negative for ear pain and sore throat.    Eyes:  Negative for pain and visual disturbance.   Respiratory:  Positive for shortness of breath. Negative for cough.    Cardiovascular:  Negative for chest pain and palpitations.   Gastrointestinal:  Positive for nausea. Negative for abdominal pain and vomiting.   Genitourinary:  Negative for dysuria and hematuria.   Musculoskeletal:  Negative for arthralgias and back pain.   Skin:  Negative for color change and rash.   Neurological:  Positive for numbness. Negative for seizures and syncope.   All other systems reviewed and are negative.      Past Medical History:   Diagnosis Date    Brain compression  (HCC) 03/20/2023    Brain mass 03/20/2023    Cancer (HCC) 2001    Receint lung and brain lesions and prostate cancer in 2001    Cerebral edema (HCC) 03/20/2023    Hypertension     Lung cancer (HCC)     Prostate cancer (HCC) 2001    Rectal bleeding     Stroke (HCC)     2011         Past Surgical History:   Procedure Laterality Date    COLONOSCOPY      CRANIOTOMY Right 3/23/2023    Procedure: Right frontal CRANIOTOMY IMAGE-GUIDED FOR TUMOR;  Surgeon: Clark Carrillo MD;  [facility]: [facility];  Service: Neurosurgery    ENDOBRONCHIAL ULTRASOUND (EBUS) N/A 4/16/2024    Procedure: ENDOBRONCHIAL ULTRASOUND (EBUS);  Surgeon: Kalia Sparrow MD;  Location: [facility];  Service: Thoracic    IR PORT PLACEMENT  4/27/2023    [facility]/CELL WASHG SPX N/A 9/1/2023    Procedure: BRONCHOSCOPY FLEXIBLE;  Surgeon: Kalia Sparrow MD;  [facility]: [facility];  Service: Thoracic    [place] [facility]/CELL WASHG SPX N/A 4/16/2024    Procedure: BRONCHOSCOPY FLEXIBLE;  Surgeon: Kalia Sparrow MD;  [facility];  Service: Thoracic    [place] CYSTOURETHROSCOPY N/A 3/23/2023    Procedure: EUA, DEL CASTILLO INSERTION;  Surgeon: Ajay Piedra MD;  [facility]: [facility];  Service: Urology    [place] THORACOSCOPY W/LOBECTOMY SINGLE LOBE Right 9/1/2023    Procedure: robotic assisted converted to open right upper lobectomy, lymph node dissection;  Surgeon: Kalia Sparrow MD;  [facility]: [facility];  Service: Thoracic    PROSTATECTOMY  2001    THORACOTOMY Right 9/1/2023    Procedure: right thoracotomy with Cryo-Ablation;  Surgeon: Kalia Sparrow MD;  [facility];  Service: Thoracic    TONSILLECTOMY         Social History     Socioeconomic History    Marital status: /Civil Union     Spouse name: None    Number of children: None    Years of education: None    Highest education level: None   Occupational History    None   Tobacco Use    Smoking status: Former     Current packs/day: 0.00      Average packs/day: 1 pack/day for 15.0 years (15.0 ttl pk-yrs)     Types: Cigarettes     Start date:      Quit date:      Years since quittin.8     Passive exposure: Never    Smokeless tobacco: Never    Tobacco comments:     i quit when i was 28. not sure of exact dates   Vaping Use    Vaping status: Never Used   Substance and Sexual Activity    Alcohol use: Not Currently     Alcohol/week: 6.0 standard drinks of alcohol     Types: 6 Cans of beer per week     Comment: socially    Drug use: Not Currently     Types: Marijuana     Comment: gummies foro nausea with chemo    Sexual activity: Yes     Partners: Female     Birth control/protection: None   Other Topics Concern    None   Social History Narrative    None     Social Determinants of Health     Financial Resource Strain: Not on file   Food Insecurity: No Food Insecurity (2024)    Hunger Vital Sign     Worried About Running Out of Food in the Last Year: Never true     Ran Out of Food in the Last Year: Never true   Transportation Needs: No Transportation Needs (2024)    PRAPARE - Transportation     Lack of Transportation (Medical): No     Lack of Transportation (Non-Medical): No   Physical Activity: Not on file   Stress: Not on file   Social Connections: Not on file   Intimate Partner Violence: Not on file   Housing Stability: Low Risk  (2024)    Housing Stability Vital Sign     Unable to Pay for Housing in the Last Year: No     Number of Times Moved in the Last Year: 1     Homeless in the Last Year: No       Family History   Problem Relation Age of Onset    Dementia Mother             Breast cancer Sister             Prostate cancer Brother         Received Radiation       No Known Allergies      Current Outpatient Medications:     acetaminophen (TYLENOL) 325 mg tablet, Take 2 tablets (650 mg total) by mouth every 6 (six) hours as needed for mild pain or fever, Disp: , Rfl:     amLODIPine (NORVASC) 10 mg tablet, Take 1  "tablet (10 mg total) by mouth daily, Disp: 30 tablet, Rfl: 2    atorvastatin (LIPITOR) 40 mg tablet, Take 1 tablet (40 mg total) by mouth daily after dinner, Disp: 30 tablet, Rfl: 0    diphenhydramine, lidocaine, Al/Mg hydroxide, simethicone (Magic Mouthwash) SUSP, Swish and spit 10 mL every 4 (four) hours as needed for mouth pain or discomfort, Disp: 480 mL, Rfl: 3    ipratropium-albuterol (DUO-NEB) 0.5-2.5 mg/3 mL nebulizer solution, Take 3 mL by nebulization 3 (three) times a day (Patient taking differently: Take 3 mL by nebulization if needed), Disp: 270 mL, Rfl: 0    lacosamide (VIMPAT) 200 mg tablet, Take 1 tablet (200 mg total) by mouth every 12 (twelve) hours, Disp: 180 tablet, Rfl: 1    levETIRAcetam (Keppra) 1000 MG tablet, Take 2 tablets (2,000 mg total) by mouth 2 (two) times a day, Disp: 360 tablet, Rfl: 3    loperamide (IMODIUM A-D) 2 MG tablet, Take 2 mg by mouth 4 (four) times a day as needed for diarrhea. Indications: Diarrhea caused by Chemotherapy, Disp: , Rfl:     losartan (COZAAR) 50 mg tablet, Take 1 tablet (50 mg total) by mouth daily, Disp: 30 tablet, Rfl: 5    Oral Wound Care Products (MuGard) LIQD, Apply 5 mL to the mouth or throat 2 (two) times a day Allow to dwellin mouth for at least one min may swallow, Disp: 240 mL, Rfl: 0    pantoprazole (PROTONIX) 40 mg tablet, Take 1 tablet (40 mg total) by mouth daily, Disp: 30 tablet, Rfl: 11    pramipexole (MIRAPEX) 0.25 mg tablet, Take 1 tablet (0.25 mg total) by mouth 3 (three) times a day, Disp: 90 tablet, Rfl: 0    clonazePAM (KlonoPIN) 1 mg tablet, Take 1 tablet (1 mg total) by mouth if needed for seizures, Disp: 5 tablet, Rfl: 0      Physical Exam:  /74 (BP Location: Left arm, Patient Position: Sitting, Cuff Size: Adult)   Pulse 81   Temp 98.2 °F (36.8 °C)   Resp 18   Ht 5' 9.02\" (1.753 m)   Wt 88.9 kg (196 lb)   SpO2 94%   BMI 28.93 kg/m²     Physical Exam  Constitutional:       Appearance: He is well-developed.   HENT:      " Head: Normocephalic and atraumatic.      Nose: Nose normal.   Eyes:      General: No scleral icterus.        Right eye: No discharge.         Left eye: No discharge.      Conjunctiva/sclera: Conjunctivae normal.      Pupils: Pupils are equal, round, and reactive to light.   Neck:      Thyroid: No thyromegaly.      Trachea: No tracheal deviation.   Cardiovascular:      Rate and Rhythm: Normal rate and regular rhythm.      Heart sounds: Normal heart sounds. No murmur heard.     No friction rub.   Pulmonary:      Effort: Pulmonary effort is normal. No respiratory distress.      Breath sounds: Normal breath sounds. No wheezing or rales.   Chest:      Chest wall: No tenderness.   Abdominal:      General: There is no distension.      Palpations: Abdomen is soft. There is no hepatomegaly or splenomegaly.      Tenderness: There is no abdominal tenderness. There is no guarding or rebound.   Musculoskeletal:         General: No tenderness or deformity. Normal range of motion.      Cervical back: Normal range of motion and neck supple.   Lymphadenopathy:      Cervical: No cervical adenopathy.   Skin:     General: Skin is warm and dry.      Coloration: Skin is not pale.      Findings: No erythema or rash.   Neurological:      Mental Status: He is alert and oriented to person, place, and time.      Cranial Nerves: No cranial nerve deficit.      Coordination: Coordination normal.      Deep Tendon Reflexes: Reflexes are normal and symmetric.   Psychiatric:         Behavior: Behavior normal.         Thought Content: Thought content normal.         Judgment: Judgment normal.           Labs:  Lab Results   Component Value Date    WBC 6.09 10/11/2024    HGB 12.7 10/11/2024    HCT 40.2 10/11/2024    MCV 91 10/11/2024     10/11/2024     Lab Results   Component Value Date     05/10/2018    K 3.6 10/11/2024     10/11/2024    CO2 28 10/11/2024    ANIONGAP 11.6 05/10/2018    BUN 13 10/11/2024    CREATININE 0.61 10/11/2024     GLUCOSE 190 (H) 09/01/2023    GLUF 143 (H) 05/20/2024    CALCIUM 9.2 10/11/2024    AST 18 10/11/2024    ALT 11 10/11/2024    ALKPHOS 60 10/11/2024    PROT 7.2 05/10/2018    BILITOT 0.8 05/10/2018    EGFR 100 10/11/2024     Lab Results   Component Value Date    TSH 3.73 09/12/2022       Patient voiced understanding and agreement in the above discussion. Aware to contact our office with questions/symptoms in the interim.

## 2024-10-17 ENCOUNTER — OFFICE VISIT (OUTPATIENT)
Dept: FAMILY MEDICINE CLINIC | Facility: CLINIC | Age: 69
End: 2024-10-17
Payer: COMMERCIAL

## 2024-10-17 ENCOUNTER — APPOINTMENT (OUTPATIENT)
Dept: RADIATION ONCOLOGY | Facility: CLINIC | Age: 69
End: 2024-10-17
Attending: STUDENT IN AN ORGANIZED HEALTH CARE EDUCATION/TRAINING PROGRAM
Payer: COMMERCIAL

## 2024-10-17 ENCOUNTER — APPOINTMENT (OUTPATIENT)
Dept: RADIATION ONCOLOGY | Facility: CLINIC | Age: 69
End: 2024-10-17
Payer: COMMERCIAL

## 2024-10-17 VITALS
OXYGEN SATURATION: 96 % | TEMPERATURE: 97.8 F | SYSTOLIC BLOOD PRESSURE: 132 MMHG | HEIGHT: 69 IN | HEART RATE: 89 BPM | WEIGHT: 198 LBS | BODY MASS INDEX: 29.33 KG/M2 | DIASTOLIC BLOOD PRESSURE: 72 MMHG

## 2024-10-17 DIAGNOSIS — Z23 NEED FOR COVID-19 VACCINE: ICD-10-CM

## 2024-10-17 DIAGNOSIS — R10.32 LEFT GROIN PAIN: ICD-10-CM

## 2024-10-17 DIAGNOSIS — Z23 ENCOUNTER FOR IMMUNIZATION: Primary | ICD-10-CM

## 2024-10-17 PROCEDURE — 99214 OFFICE O/P EST MOD 30 MIN: CPT | Performed by: STUDENT IN AN ORGANIZED HEALTH CARE EDUCATION/TRAINING PROGRAM

## 2024-10-17 PROCEDURE — 91320 SARSCV2 VAC 30MCG TRS-SUC IM: CPT | Performed by: STUDENT IN AN ORGANIZED HEALTH CARE EDUCATION/TRAINING PROGRAM

## 2024-10-17 PROCEDURE — 90480 ADMN SARSCOV2 VAC 1/ONLY CMP: CPT | Performed by: STUDENT IN AN ORGANIZED HEALTH CARE EDUCATION/TRAINING PROGRAM

## 2024-10-17 PROCEDURE — 77014 CHG CT GUIDANCE RADIATION THERAPY FLDS PLACEMENT: CPT | Performed by: STUDENT IN AN ORGANIZED HEALTH CARE EDUCATION/TRAINING PROGRAM

## 2024-10-17 PROCEDURE — 90471 IMMUNIZATION ADMIN: CPT | Performed by: STUDENT IN AN ORGANIZED HEALTH CARE EDUCATION/TRAINING PROGRAM

## 2024-10-17 PROCEDURE — 77386 HB NTSTY MODUL RAD TX DLVR CPLX: CPT | Performed by: STUDENT IN AN ORGANIZED HEALTH CARE EDUCATION/TRAINING PROGRAM

## 2024-10-17 PROCEDURE — 90662 IIV NO PRSV INCREASED AG IM: CPT | Performed by: STUDENT IN AN ORGANIZED HEALTH CARE EDUCATION/TRAINING PROGRAM

## 2024-10-17 NOTE — PROGRESS NOTES
Ambulatory Visit  Name: Paras Pitts      : 1955      MRN: [id number]  Encounter Provider: Addy Craven MD  Encounter Date: 10/17/2024   Encounter department: [facility] PRIMARY CARE    Assessment & Plan  Encounter for immunization    Orders:    influenza vaccine, high-dose, PF 0.5 mL (Fluzone High Dose)    Need for COVID-19 vaccine    Orders:    COVID-19 Pfizer mRNA vaccine 12 yr and older (Comirnaty pre-filled syringe)    Left groin pain  S/p fall  with negative xrays of the left hip  Persistent pain in left hip left groin unchanged with 6 weeks of PT  Pain is affecting ADLs and ability to ambulate unassisted    Is receiving chemo concerns for possible hip flexor tendon injury vs non specific osseous hip injust such as avascular necrosis, microfracture..... will need MRI to futher characterize injury pattern and formal diagnsosi     Orders:    MRI hip left wo contrast; Future       History of Present Illness     HPI       patient reports he fell while trying to stand and stuck the left hip.  Went to the urgent care, xrays performed which showed no fractures.  He has been continuing with radiation and infusions.  He also reports continuing with physical therapy but reports abnormal john s/p fall on .   Reports he still continues to have pain in the left inguinal crease with weight bearing/walking.  No pain at rest.   No previous injuries to that area.           History obtained from : patient  Review of Systems   Constitutional:  Negative for activity change, appetite change, chills, fatigue and fever.   HENT:  Negative for congestion, dental problem, drooling, ear discharge, ear pain, facial swelling, postnasal drip, rhinorrhea and sinus pain.    Eyes:  Negative for photophobia, pain, discharge and itching.   Respiratory:  Negative for apnea, cough, chest tightness and shortness of breath.    Cardiovascular:  Negative for chest pain and leg swelling.   Gastrointestinal:  Negative  "for abdominal distention, abdominal pain, anal bleeding, constipation, diarrhea and nausea.   Endocrine: Negative for cold intolerance, heat intolerance and polydipsia.   Genitourinary:  Negative for difficulty urinating.   Musculoskeletal:  Positive for gait problem. Negative for arthralgias, joint swelling and myalgias.   Skin:  Negative for color change and pallor.   Allergic/Immunologic: Negative for immunocompromised state.   Neurological:  Negative for dizziness, seizures, facial asymmetry, weakness, light-headedness, numbness and headaches.   Psychiatric/Behavioral:  Negative for agitation, behavioral problems, confusion, decreased concentration and dysphoric mood.    All other systems reviewed and are negative.    Pertinent Medical History                 Objective     /72 (BP Location: Left arm, Patient Position: Sitting, Cuff Size: Adult)   Pulse 89   Temp 97.8 °F (36.6 °C) (Tympanic)   Ht 5' 9\" (1.753 m)   Wt 89.8 kg (198 lb)   SpO2 96%   BMI 29.24 kg/m²     Physical Exam  Constitutional:       Appearance: He is well-developed.   HENT:      Head: Normocephalic.   Eyes:      Pupils: Pupils are equal, round, and reactive to light.   Cardiovascular:      Rate and Rhythm: Normal rate and regular rhythm.   Pulmonary:      Effort: Pulmonary effort is normal.      Breath sounds: Normal breath sounds.   Abdominal:      General: Bowel sounds are normal.      Palpations: Abdomen is soft.   Musculoskeletal:         General: Normal range of motion.      Cervical back: Normal range of motion and neck supple.   Skin:     General: Skin is warm.   Neurological:      Gait: Gait abnormal.      Comments: Ataxic shuffling gait.  Hip overall is unremarkable upon palpation no obvious crepitus there is tenderness palpation in the left inguinal region however no obvious palpable hernia.  There is significantly reproduced pain with external rotation of the hip.       "

## 2024-10-18 ENCOUNTER — APPOINTMENT (OUTPATIENT)
Dept: RADIATION ONCOLOGY | Facility: CLINIC | Age: 69
End: 2024-10-18
Attending: STUDENT IN AN ORGANIZED HEALTH CARE EDUCATION/TRAINING PROGRAM
Payer: COMMERCIAL

## 2024-10-18 ENCOUNTER — APPOINTMENT (OUTPATIENT)
Dept: RADIATION ONCOLOGY | Facility: CLINIC | Age: 69
End: 2024-10-18
Payer: COMMERCIAL

## 2024-10-18 PROCEDURE — 77386 HB NTSTY MODUL RAD TX DLVR CPLX: CPT | Performed by: RADIOLOGY

## 2024-10-18 PROCEDURE — 77014 CHG CT GUIDANCE RADIATION THERAPY FLDS PLACEMENT: CPT | Performed by: RADIOLOGY

## 2024-10-21 ENCOUNTER — APPOINTMENT (OUTPATIENT)
Dept: RADIATION ONCOLOGY | Facility: CLINIC | Age: 69
End: 2024-10-21
Attending: STUDENT IN AN ORGANIZED HEALTH CARE EDUCATION/TRAINING PROGRAM
Payer: COMMERCIAL

## 2024-10-21 ENCOUNTER — APPOINTMENT (OUTPATIENT)
Dept: RADIATION ONCOLOGY | Facility: CLINIC | Age: 69
End: 2024-10-21
Payer: COMMERCIAL

## 2024-10-21 ENCOUNTER — OFFICE VISIT (OUTPATIENT)
Dept: PHYSICAL THERAPY | Facility: CLINIC | Age: 69
End: 2024-10-21
Payer: COMMERCIAL

## 2024-10-21 DIAGNOSIS — G81.94 LEFT HEMIPARESIS (HCC): ICD-10-CM

## 2024-10-21 DIAGNOSIS — R26.2 AMBULATORY DYSFUNCTION: Primary | ICD-10-CM

## 2024-10-21 PROCEDURE — 97110 THERAPEUTIC EXERCISES: CPT

## 2024-10-21 PROCEDURE — 97112 NEUROMUSCULAR REEDUCATION: CPT

## 2024-10-21 PROCEDURE — 97116 GAIT TRAINING THERAPY: CPT

## 2024-10-21 NOTE — PROGRESS NOTES
"Daily Note     Today's date: 10/21/2024  Patient name: Paras Pitts  : 1955  MRN: [id number]  Referring provider: Addy Craven MD  Dx:   Encounter Diagnosis     ICD-10-CM    1. Ambulatory dysfunction  R26.2       2. Left hemiparesis (HCC)  G81.94                      Subjective: Pt reports he had his last radiation treatment today! States Dr Craven ordered MRI for L groin pain. Pt reports he was able to perform 12 steps at home to go upstairs.      Objective: See treatment diary below      Assessment: Tolerated treatment well. Pt demonstrates safe sit to stand and stand to sit transfers. Patient demonstrated fatigue post treatment, exhibited good technique with therapeutic exercises, and would benefit from continued PT      Plan: Continue per plan of care.      Precautions: Active CA, Chemo/radiation hx of stroke       Manuals 10/7 10/9 10/14 10/21 9/16 9/18 9/23 9/25 9/30 10/2                                                       Neuro Re-Ed             Hip add 20x 20x 20 x 20x 20x 20x x20 x20 x20 x20   Hip abd  20x   blue 20x  blue 20 x blue 20x   blue 20x  blue 20x   blue X20  Blue x20  blue x20  blue x20   LAQ  R 2.5#  X20  Ea  L 0# 2x10 R 2.5#  X20    L 1.5# 2x10 20 x  R 2.5#   0# L 2.5#  20x 2.5#  20x ea 2.5#  20x ea 2.5#   X20 ea 2.5#  X20 ea 2.5#  X20 ea R 2.5#  X20  Ea  L 0# 2x10   March  R 2.5#   L 0# x20 ea 20x  R 2.5# x20    L 1.5# x20 Seated R 0#   2x10    Stand L 0# 2x10 Seated R 1.5#   2x10    Stand L 0# 2x10 2.5#  20x  L standing/R seated 2.5#  L standing  R seated  X20 ea 2.5% L standing R seated x20 ea 2.5#  Both seated today  L groin pain 2.5#  Both seated today  L groin pain R 2.5#   L 0# x20 ea                                          Ther Ex             HR TR  20x 20x X20  x20 x20 x20 X20  x20 x20 x20   Mini Squat  NV 20x Sit to stand x20 X20  High low table x20 x20 x20 x20 x20 NV   Step Up  See below L only 4\" step x7     20x  2.5#  Step A Full stairs see below See below See below " "See below L only  4\" 0# x10   Three way  L only 2.5# 20x ea L only 1.5# x20  X20   0# L only  20x ea  0#   L only 20x standing on right  2.5# L only  2.5#  X20 ea L only 2.5#   X20 ea L only 2.5# 20x ea L only 2.5# 20x ea L only 2.5# 20x ea   Nu step   L3 x  10 min L3 x 10 min L3x10 min L3  X10  min L2  X10 min L2  x10  min L2 x10 min L3 x10 min L3 x10 min L3 x10 min                                          Ther Activity                                       Gait Training              3 laps 3 laps NV 3 laps 2 laps 1 lap 2 laps   1 rest 2 laps to steps 2 laps in clinic to car 2 laps     Step training up/down 4 steps  X3 with gait belt Step training up/down 4 steps  X3 with gait belt Step training up/down 4 steps  X3 with gait belt Step training up/down 4 step 3 X B/L Hrs,  X1 with SPC and 1 HR  Step training  X3 up/down Step traininup dpwn 4 steps x2 Step training up/down 4 steps  X2 with gait belt Step ups 4\" 1x7  Step training up/down 4 steps  X2 with gait belt   Modalities                                           Treated by jose j MELLO under direct supervision of Abhijeet Bhatt DPT       Access Code: [email or web address]  URL: [email or web address]  Date: 09/23/2024  Prepared by: Jaclyn Ramos    Exercises  - Seated Hip Adduction Squeeze with Ball  - 1 x daily - 3 x weekly - 2 sets - 10 reps - 5\" hold  - Seated Hip Abduction with Resistance  - 1 x daily - 3 x weekly - 2 sets - 10 reps - 5\" hold  - Seated Long Arc Quad  - 1 x daily - 3 x weekly - 2 sets - 10 reps - 5\" hold  - Seated Toe Raise  - 1 x daily - 3 x weekly - 2 sets - 10 reps  - Seated March  - 1 x daily - 3 x weekly - 2 sets - 10 reps                         "

## 2024-10-23 ENCOUNTER — OFFICE VISIT (OUTPATIENT)
Dept: PHYSICAL THERAPY | Facility: CLINIC | Age: 69
End: 2024-10-23
Payer: COMMERCIAL

## 2024-10-23 DIAGNOSIS — R26.2 AMBULATORY DYSFUNCTION: Primary | ICD-10-CM

## 2024-10-23 DIAGNOSIS — G81.94 LEFT HEMIPARESIS (HCC): ICD-10-CM

## 2024-10-23 PROCEDURE — 97112 NEUROMUSCULAR REEDUCATION: CPT | Performed by: PHYSICAL THERAPIST

## 2024-10-23 PROCEDURE — 97116 GAIT TRAINING THERAPY: CPT | Performed by: PHYSICAL THERAPIST

## 2024-10-23 PROCEDURE — 97110 THERAPEUTIC EXERCISES: CPT | Performed by: PHYSICAL THERAPIST

## 2024-10-23 NOTE — PROGRESS NOTES
"Daily Note     Today's date: 10/23/2024  Patient name: Paras Pitts  : 1955  MRN: [id number]  Referring provider: Addy Craven MD  Dx:   Encounter Diagnosis     ICD-10-CM    1. Ambulatory dysfunction  R26.2       2. Left hemiparesis (HCC)  G81.94                      Subjective: Pt reports he is ok. He did a lot of steps at home today. Dr. Craven ordered MRI of the right hip.       Objective: See treatment diary below      Assessment: Tolerated treatment well. Patient exhibited good technique with therapeutic exercises and would benefit from continued PT.  Hip pain reproduced with step up exercises this date. Tolerated all TE well without incident       Plan: Continue per plan of care.      Precautions: Active CA, Chemo/radiation hx of stroke       Manuals 10/7 10/9 10/14 10/21 10/23 9/18 9/23 9/25 9/30 10/2                                                       Neuro Re-Ed             Hip add 20x 20x 20 x 20x 20x 20x x20 x20 x20 x20   Hip abd  20x   blue 20x  blue 20 x blue 20x   blue 20x  blue 20x   blue X20  Blue x20  blue x20  blue x20   LAQ  R 2.5#  X20  Ea  L 0# 2x10 R 2.5#  X20    L 1.5# 2x10 20 x  R 2.5#   0# L 2.5#  20x 1.5#  20x ea 2.5#  20x ea 2.5#   X20 ea 2.5#  X20 ea 2.5#  X20 ea R 2.5#  X20  Ea  L 0# 2x10   March  R 2.5#   L 0# x20 ea 20x  R 2.5# x20    L 1.5# x20 Seated R 0#   2x10    Stand L 0# 2x10 Seated R 1.5#   2x10    Stand L 0# 2x10 1.5#  20x  L standing/R seated 2.5#  L standing  R seated  X20 ea 2.5% L standing R seated x20 ea 2.5#  Both seated today  L groin pain 2.5#  Both seated today  L groin pain R 2.5#   L 0# x20 ea                                          Ther Ex             HR TR  20x 20x X20  x20 x20 x20 X20  x20 x20 x20   Mini Squat  NV 20x Sit to stand x20 X20  High low table x20 x20 x20 x20 x20 NV   Step Up  See below L only 4\" step x7     20x  1.5#  Step A Full stairs see below See below See below See below L only  4\" 0# x10   Three way  L only 2.5# 20x ea L only 1.5# " "x20  X20   0# L only  20x ea  0#   L only 20x standing on right  1.5# L only  2.5#  X20 ea L only 2.5#   X20 ea L only 2.5# 20x ea L only 2.5# 20x ea L only 2.5# 20x ea   Nu step   L3 x  10 min L3 x 10 min L3x10 min L3  X10  min L2  X10 min L2  x10  min L2 x10 min L3 x10 min L3 x10 min L3 x10 min                                          Ther Activity                                       Gait Training              3 laps 3 laps NV 3 laps 2 laps 1 lap 2 laps   1 rest 2 laps to steps 2 laps in clinic to car 2 laps     Step training up/down 4 steps  X3 with gait belt Step training up/down 4 steps  X3 with gait belt Step training up/down 4 steps  X3 with gait belt Step training up/down 4 step 3 X B/L Hrs,  X1 with SPC and 1 HR Step training up/down 4 step 3 X B/L Hrs,  X1 with SPC and 1 HR Step training  X3 up/down Step traininup dpwn 4 steps x2 Step training up/down 4 steps  X2 with gait belt Step ups 4\" 1x7  Step training up/down 4 steps  X2 with gait belt   Modalities                                           Treated by jose j MELLO under direct supervision of Abhijeet Bhatt DPT       Access Code: [email or web address]  URL: [email or web address]  Date: 09/23/2024  Prepared by: Jaclyn Ramos    Exercises  - Seated Hip Adduction Squeeze with Ball  - 1 x daily - 3 x weekly - 2 sets - 10 reps - 5\" hold  - Seated Hip Abduction with Resistance  - 1 x daily - 3 x weekly - 2 sets - 10 reps - 5\" hold  - Seated Long Arc Quad  - 1 x daily - 3 x weekly - 2 sets - 10 reps - 5\" hold  - Seated Toe Raise  - 1 x daily - 3 x weekly - 2 sets - 10 reps  - Seated March  - 1 x daily - 3 x weekly - 2 sets - 10 reps                           "

## 2024-10-28 ENCOUNTER — APPOINTMENT (OUTPATIENT)
Dept: PHYSICAL THERAPY | Facility: CLINIC | Age: 69
End: 2024-10-28
Payer: COMMERCIAL

## 2024-10-29 ENCOUNTER — TELEPHONE (OUTPATIENT)
Dept: HEMATOLOGY ONCOLOGY | Facility: CLINIC | Age: 69
End: 2024-10-29

## 2024-10-29 ENCOUNTER — OFFICE VISIT (OUTPATIENT)
Dept: PHYSICAL THERAPY | Facility: CLINIC | Age: 69
End: 2024-10-29
Payer: COMMERCIAL

## 2024-10-29 DIAGNOSIS — R26.2 AMBULATORY DYSFUNCTION: Primary | ICD-10-CM

## 2024-10-29 DIAGNOSIS — G81.94 LEFT HEMIPARESIS (HCC): ICD-10-CM

## 2024-10-29 PROCEDURE — 97110 THERAPEUTIC EXERCISES: CPT

## 2024-10-29 PROCEDURE — 97112 NEUROMUSCULAR REEDUCATION: CPT

## 2024-10-29 NOTE — TELEPHONE ENCOUNTER
Left voice messages on pt's and wife's mobile phones asking if pt feels up to having treatment on 10/31/24. Left the [facility] office number [phone] or [phone] for return call as Shanell Moreira RN will be working Wed and thur.

## 2024-10-29 NOTE — PROGRESS NOTES
Daily Note     Today's date: 10/29/2024  Patient name: Paras Pitts  : 1955  MRN: [id number]  Referring provider: Addy Craven MD  Dx:   Encounter Diagnosis     ICD-10-CM    1. Ambulatory dysfunction  R26.2       2. Left hemiparesis (HCC)  G81.94           Start Time: 1634  Stop Time: 1733  Total time in clinic (min): 59 minutes    Subjective: Pt reports he has been doing ok with no real change in status since LV.  L groin pain has been still present and has MRI scheduled this coming  to address this issue.        Objective: See treatment diary below      Assessment: Tolerated treatment well. Is appropriately challenged with current program.  Greatest challenge with fwd step up with weight resistance at ankle, especially during LLE lead.  Visible signs of fatigue present by end of treatment.  Patient would benefit from continued PT to further improve strength, increase endurance, and maximize overall function.        Plan: Continue per plan of care.      Precautions: Active CA, Chemo/radiation hx of stroke       Manuals 10/7 10/9 10/14 10/21 10/23 10/29  9/25 9/30 10/2                                                       Neuro Re-Ed             Hip add 20x 20x 20 x 20x 20x 20x    x20 x20 x20   Hip abd  20x   blue 20x  blue 20 x blue 20x   blue 20x  blue 20x  blue  x20  blue x20  blue x20   LAQ  R 2.5#  X20  Ea  L 0# 2x10 R 2.5#  X20    L 1.5# 2x10 20 x  R 2.5#   0# L 2.5#  20x 1.5#  20x ea 1.5#  20x ea  2.5#  X20 ea 2.5#  X20 ea R 2.5#  X20  Ea  L 0# 2x10   March  R 2.5#   L 0# x20 ea 20x  R 2.5# x20    L 1.5# x20 Seated R 0#   2x10    Stand L 0# 2x10 Seated R 1.5#   2x10    Stand L 0# 2x10 1.5#  20x  L standing/R seated 1.5#  20x  L standing/R seated  2.5#  Both seated today  L groin pain 2.5#  Both seated today  L groin pain R 2.5#   L 0# x20 ea                                          Ther Ex             HR TR  20x 20x X20  x20 x20 x20  x20 x20 x20   Mini Squat  NV 20x Sit to stand x20 X20  High  "low table x20 x20  x20 x20 NV   Step Up  See below L only 4\" step x7     20x  1.5#  Step A 20x  1.5#  Step A  See below See below L only  4\" 0# x10   Three way  L only 2.5# 20x ea L only 1.5# x20  X20   0# L only  20x ea  0#   L only 20x standing on right  1.5# 20x standing on right  1.5#  L only 2.5# 20x ea L only 2.5# 20x ea L only 2.5# 20x ea   Nu step   L3 x  10 min L3 x 10 min L3x10 min L3  X10  min L2  X10 min L2  X10 min  L3 x10 min L3 x10 min L3 x10 min                                          Ther Activity                                       Gait Training              3 laps 3 laps NV 3 laps 2 laps 2 laps  2 laps to steps 2 laps in clinic to car 2 laps     Step training up/down 4 steps  X3 with gait belt Step training up/down 4 steps  X3 with gait belt Step training up/down 4 steps  X3 with gait belt Step training up/down 4 step 3 X B/L Hrs,  X1 with SPC and 1 HR Step training up/down 4 step 3 X B/L Hrs,  X1 with SPC and 1 HR nv  Step training up/down 4 steps  X2 with gait belt Step ups 4\" 1x7  Step training up/down 4 steps  X2 with gait belt   Modalities                                                 Access Code: [email or web address]  URL: [email or web address]  Date: 09/23/2024  Prepared by: Jaclyn Ramos    Exercises  - Seated Hip Adduction Squeeze with Ball  - 1 x daily - 3 x weekly - 2 sets - 10 reps - 5\" hold  - Seated Hip Abduction with Resistance  - 1 x daily - 3 x weekly - 2 sets - 10 reps - 5\" hold  - Seated Long Arc Quad  - 1 x daily - 3 x weekly - 2 sets - 10 reps - 5\" hold  - Seated Toe Raise  - 1 x daily - 3 x weekly - 2 sets - 10 reps  - Seated March  - 1 x daily - 3 x weekly - 2 sets - 10 reps                             "

## 2024-10-30 ENCOUNTER — TELEPHONE (OUTPATIENT)
Age: 69
End: 2024-10-30

## 2024-10-30 ENCOUNTER — OFFICE VISIT (OUTPATIENT)
Dept: PHYSICAL THERAPY | Facility: CLINIC | Age: 69
End: 2024-10-30
Payer: COMMERCIAL

## 2024-10-30 DIAGNOSIS — R26.2 AMBULATORY DYSFUNCTION: Primary | ICD-10-CM

## 2024-10-30 DIAGNOSIS — G81.94 LEFT HEMIPARESIS (HCC): ICD-10-CM

## 2024-10-30 PROCEDURE — 97116 GAIT TRAINING THERAPY: CPT

## 2024-10-30 PROCEDURE — 97110 THERAPEUTIC EXERCISES: CPT

## 2024-10-30 PROCEDURE — 97112 NEUROMUSCULAR REEDUCATION: CPT

## 2024-10-30 NOTE — PROGRESS NOTES
"Daily Note     Today's date: 10/30/2024  Patient name: Paras Pitts  : 1955  MRN: [id number]  Referring provider: Addy Craven MD  Dx:   Encounter Diagnosis     ICD-10-CM    1. Ambulatory dysfunction  R26.2       2. Left hemiparesis (HCC)  G81.94                      Subjective: Pt reports he walked outdoors a bit prior to PT today.       Objective: See treatment diary below      Assessment: Tolerated treatment well. Pt continues to ambulate with RW, slow to progress secondary to c/o L hip pain with SLS on L. Patient demonstrated fatigue post treatment, exhibited good technique with therapeutic exercises, and would benefit from continued PT      Plan: Continue per plan of care.      Precautions: Active CA, Chemo/radiation hx of stroke       Manuals 10/7 10/9 10/14 10/21 10/23 10/29 10/30 9/25 9/30 10/2                                                       Neuro Re-Ed             Hip add 20x 20x 20 x 20x 20x 20x   x20 x20 x20 x20   Hip abd  20x   blue 20x  blue 20 x blue 20x   blue 20x  blue 20x  blue 20 x blue x20  blue x20  blue x20   LAQ  R 2.5#  X20  Ea  L 0# 2x10 R 2.5#  X20    L 1.5# 2x10 20 x  R 2.5#   0# L 2.5#  20x 1.5#  20x ea 1.5#  20x ea 1.5# x20 ea 2.5#  X20 ea 2.5#  X20 ea R 2.5#  X20  Ea  L 0# 2x10   March  R 2.5#   L 0# x20 ea 20x  R 2.5# x20    L 1.5# x20 Seated R 0#   2x10    Stand L 0# 2x10 Seated R 1.5#   2x10    Stand L 0# 2x10 1.5#  20x  L standing/R seated 1.5#  20x  L standing/R seated standing  1.5#   X20 L LE  X20 RLE seated  2.5#  Both seated today  L groin pain 2.5#  Both seated today  L groin pain R 2.5#   L 0# x20 ea                                          Ther Ex             HR TR  20x 20x X20  x20 x20 x20 x20 x20 x20 x20   Mini Squat  NV 20x Sit to stand x20 X20  High low table x20 x20 NV x20 x20 NV   Step Up  See below L only 4\" step x7     20x  1.5#  Step A 20x  1.5#  Step A Step 'A\" x20   L LE See below See below L only  4\" 0# x10   Three way  L only 2.5# 20x ea L only " "1.5# x20  X20   0# L only  20x ea  0#   L only 20x standing on right  1.5# 20x standing on right  1.5# X20  Standing on R LE  1.5# on L L only 2.5# 20x ea L only 2.5# 20x ea L only 2.5# 20x ea   Nu step   L3 x  10 min L3 x 10 min L3x10 min L3  X10  min L2  X10 min L2  X10 min L3 x10 min L3 x10 min L3 x10 min L3 x10 min                                          Ther Activity                                       Gait Training              3 laps 3 laps NV 3 laps 2 laps 2 laps 3 laps 2 laps to steps 2 laps in clinic to car 2 laps     Step training up/down 4 steps  X3 with gait belt Step training up/down 4 steps  X3 with gait belt Step training up/down 4 steps  X3 with gait belt Step training up/down 4 step 3 X B/L Hrs,  X1 with SPC and 1 HR Step training up/down 4 step 3 X B/L Hrs,  X1 with SPC and 1 HR nv Step training up/down 4 steps  X3 with gait belt Step training up/down 4 steps  X2 with gait belt Step ups 4\" 1x7  Step training up/down 4 steps  X2 with gait belt   Modalities                                                 Access Code: [email or web address]  URL: [email or web address]  Date: 09/23/2024  Prepared by: Jaclyn Ramos    Exercises  - Seated Hip Adduction Squeeze with Ball  - 1 x daily - 3 x weekly - 2 sets - 10 reps - 5\" hold  - Seated Hip Abduction with Resistance  - 1 x daily - 3 x weekly - 2 sets - 10 reps - 5\" hold  - Seated Long Arc Quad  - 1 x daily - 3 x weekly - 2 sets - 10 reps - 5\" hold  - Seated Toe Raise  - 1 x daily - 3 x weekly - 2 sets - 10 reps  - Seated March  - 1 x daily - 3 x weekly - 2 sets - 10 reps                               "

## 2024-10-31 ENCOUNTER — HOSPITAL ENCOUNTER (OUTPATIENT)
Dept: INFUSION CENTER | Facility: HOSPITAL | Age: 69
Discharge: HOME | End: 2024-10-31
Attending: INTERNAL MEDICINE

## 2024-10-31 ENCOUNTER — TELEPHONE (OUTPATIENT)
Dept: INFUSION CENTER | Facility: HOSPITAL | Age: 69
End: 2024-10-31

## 2024-10-31 NOTE — TELEPHONE ENCOUNTER
Call placed to pt's wife, as pt has not yet had blood work performed for today's planned tx. Per pt's wife, they are not coming for infusions anymore as his infusions are on hold. Reviewed next appointments with pt's wife, and next planned infusion appointment that pt is planning on coming to is on 11/22 for a port flush. Shanell Moreira RN in Dr Langford's office made aware and infusion appt cancelled for today.

## 2024-11-04 NOTE — TELEPHONE ENCOUNTER
Can we please schedule an appeal/peer to peer.  All information needed is in my previous office note.
Denial notice came in  
We have not received the denial as of today, once received we can start the peer to peer process  
Wife called and stated that she was informed that the MRI ws denied at this time by the insurance and she would like to know what the next step would be?  Please advise  
Home

## 2024-11-08 ENCOUNTER — TELEPHONE (OUTPATIENT)
Age: 69
End: 2024-11-08

## 2024-11-08 NOTE — TELEPHONE ENCOUNTER
Patient and spouse under the impression that they were supposed to meet Dr. Reeves at the [facility] for his appointment, patient's spouse saw the appointment was a post treatment phone visit. Please contact [employer] and [phone].    Thank you.

## 2024-11-08 NOTE — TELEPHONE ENCOUNTER
RAD ONC-Reviewed appointment details, patients wife verbalized understanding and was appreciative of the call...KD

## 2024-11-10 DIAGNOSIS — I10 HYPERTENSION: ICD-10-CM

## 2024-11-10 DIAGNOSIS — G40.109 FOCAL EPILEPSY ORIGINATING IN FRONTAL LOBE (HCC): ICD-10-CM

## 2024-11-10 DIAGNOSIS — E78.00 HYPERCHOLESTEROLEMIA: ICD-10-CM

## 2024-11-10 DIAGNOSIS — I10 ESSENTIAL HYPERTENSION: ICD-10-CM

## 2024-11-10 DIAGNOSIS — Z76.0 MEDICATION REFILL: ICD-10-CM

## 2024-11-11 RX ORDER — LACOSAMIDE 200 MG/1
200 TABLET ORAL EVERY 12 HOURS SCHEDULED
Qty: 60 TABLET | Refills: 0 | Status: SHIPPED | OUTPATIENT
Start: 2024-11-11

## 2024-11-11 NOTE — TELEPHONE ENCOUNTER
Medication: Vimpat   PDMP  10/10/2024 10/10/2024 Lacosamide (Tablet) 60.0 30 200 MG NA [facility] Commercial Insurance 0 / 5 [place]   1 [id number] 09/04/2024 09/04/2024 clonazePAM (Tablet) 5.0 5 1 MG NA [facility] Commercial Insurance 0 / 0 [place]   1 [id number] 09/04/2024 09/04/2024 Lacosamide (Tablet) 60.0 30 200 MG [facility] 0 / 5 [place]   1 [id number] 05/24/2024 05/23/2024 cloBAZam (Tablet) 10.0 10 10 MG NA LYN MEJ[facility] Commercial Insurance 0 / 0 [place]  Active agreement on file -No       Pharmacy only filling 30 days at a a time

## 2024-11-12 DIAGNOSIS — G40.109 FOCAL EPILEPSY ORIGINATING IN FRONTAL LOBE (HCC): ICD-10-CM

## 2024-11-12 RX ORDER — LOSARTAN POTASSIUM 50 MG/1
50 TABLET ORAL DAILY
Qty: 30 TABLET | Refills: 5 | Status: SHIPPED | OUTPATIENT
Start: 2024-11-12

## 2024-11-12 RX ORDER — AMLODIPINE BESYLATE 10 MG/1
10 TABLET ORAL DAILY
Qty: 30 TABLET | Refills: 5 | Status: SHIPPED | OUTPATIENT
Start: 2024-11-12

## 2024-11-12 RX ORDER — ATORVASTATIN CALCIUM 40 MG/1
40 TABLET, FILM COATED ORAL
Qty: 30 TABLET | Refills: 0 | Status: SHIPPED | OUTPATIENT
Start: 2024-11-12

## 2024-11-13 RX ORDER — LEVETIRACETAM 1000 MG/1
2000 TABLET ORAL 2 TIMES DAILY
Qty: 120 TABLET | Refills: 5 | Status: SHIPPED | OUTPATIENT
Start: 2024-11-13 | End: 2025-11-08

## 2024-11-21 ENCOUNTER — OFFICE VISIT (OUTPATIENT)
Dept: RADIATION ONCOLOGY | Facility: HOSPITAL | Age: 69
End: 2024-11-21
Attending: STUDENT IN AN ORGANIZED HEALTH CARE EDUCATION/TRAINING PROGRAM

## 2024-11-21 DIAGNOSIS — C34.91 CARCINOMA OF RIGHT LUNG (HCC): ICD-10-CM

## 2024-11-21 DIAGNOSIS — C79.31 METASTASIS TO BRAIN (HCC): Primary | ICD-10-CM

## 2024-11-21 PROCEDURE — 99024 POSTOP FOLLOW-UP VISIT: CPT | Performed by: STUDENT IN AN ORGANIZED HEALTH CARE EDUCATION/TRAINING PROGRAM

## 2024-11-21 NOTE — ASSESSMENT & PLAN NOTE
Mr. Paras Pitts is a 69 year old man with Stage JAY ([id number]) lung adenocarcinoma s/p resection of a right frontal metastasis followed by SRT. He thereafter underwent right sided lobectomy and MLNDx with pathology demonstrating negative margins, 5/22 LNs involved with SARAHY. He was thereafter continued on immunotherapy until he developed mediastinal POD. On 10/21/24 he completed a course of RT to a dose of 6000cGy in 30 fractions.     The patient tolerated RT well overall with limited acute side effects. He did experience fatigue throughout treatment, though did not experience substantial esophagitis or cough/SOB.     He is scheduled for post-treatment PET/CT on 1/20/25, which will hopefully show good radiographic response in his treated mediastinal disease.

## 2024-11-21 NOTE — ASSESSMENT & PLAN NOTE
Pt with history of right frontal metastasis s/p resection and adjuvant SRT (4/28/23). Good response complicated by radionecrosis treated with steroids and thereafter Avastin. Most recent MRI Brain on 9/4/24 with continued good disease control and reduced intracranial edema.     Recommend repeat MRI Brain ~3/2025. Will see back in clinic thereafter to review.     Orders:    MRI Brain BT w wo Contrast; Future

## 2024-11-21 NOTE — PROGRESS NOTES
TELEPHONE FOLLOW UP  Follow-up Visit Name: Paras Pitts      : 1955      MRN: [id number]  Encounter Provider: James Contreras MD  Encounter Date: 2024   Encounter department: [facility] RADIATION ONCOLOGY       Cancer Staging   Carcinoma of right lung (HCC)  Staging form: Lung, AJCC 8th Edition  - Clinical: Stage JAY (cT3, cN1, cM1b) - Signed by James Contreras MD on 2023  - Pathologic stage from 2023: Stage JAY (pT2b, pN2, cM1b) - Signed by Treva Bah PA-C on 2023  :  Assessment & Plan  Carcinoma of right lung (HCC)  Mr. Paras Pitts is a 69 year old man with Stage JAY ([id number]) lung adenocarcinoma s/p resection of a right frontal metastasis followed by SRT. He thereafter underwent right sided lobectomy and MLNDx with pathology demonstrating negative margins, 5/22 LNs involved with SARAHY. He was thereafter continued on immunotherapy until he developed mediastinal POD. On 10/21/24 he completed a course of RT to a dose of 6000cGy in 30 fractions.     The patient tolerated RT well overall with limited acute side effects. He did experience fatigue throughout treatment, though did not experience substantial esophagitis or cough/SOB.     He is scheduled for post-treatment PET/CT on 25, which will hopefully show good radiographic response in his treated mediastinal disease.              Metastasis to brain (HCC)  Pt with history of right frontal metastasis s/p resection and adjuvant SRT (23). Good response complicated by radionecrosis treated with steroids and thereafter Avastin. Most recent MRI Brain on 24 with continued good disease control and reduced intracranial edema.     Recommend repeat MRI Brain ~3/2025. Will see back in clinic thereafter to review.     Orders:    MRI Brain BT w wo Contrast; Future      Return to office in 4 month(s) with repeat MRI Brain.     History of Present Illness   No chief complaint on file.    Pertinent Medical  History   The patient was last seen on 10/21/2024 at the completion of RT.  Since then he has been doing fairly well overall.  His prior fatigue has improved.  He has no esophagitis symptoms, no cough or shortness of breath.  His main issue at present is ongoing ambulatory difficulty owing to a prior fall last summer.  He had been undergoing physical therapy, however insurance discontinued this.  We discussed that he might benefit from physiatrist evaluation, but that I would defer that decision to his PCP.  At this time he has no further plans for systemic therapy.      Oncology History   Oncology History   Metastatic adenocarcinoma (HCC)   2023 Initial Diagnosis    Metastatic adenocarcinoma (HCC)     3/23/2023 Biopsy    Brain, right frontal mass (biopsy):  - Metastatic non-small cell carcinoma     Comment:  - Tumor cells stain diffusely for CAM5.2, CKAE1/3, CK7, TTF1 and NapsinA with absent CK20, p63, CK5/6, p40 expression.  This immunopanel favors a metastatic lung adenocarcinoma.       5/18/2023 - 7/20/2023 Chemotherapy    cyanocobalamin, 1,000 mcg, Intramuscular, Once, 3 of 3 cycles  Administration: 1,000 mcg (6/29/2023), 1,000 mcg (5/18/2023), 1,000 mcg (7/20/2023)  alteplase (CATHFLO), 2 mg, Intracatheter, Every 1 Minute as needed, 4 of 4 cycles  pegfilgrastim (NEULASTA ONPRO), 6 mg, Subcutaneous, Once, 3 of 3 cycles  Administration: 6 mg (6/8/2023), 6 mg (6/29/2023), 6 mg (7/20/2023)  fosaprepitant (EMEND) IVPB, 150 mg, Intravenous, Once, 4 of 4 cycles  Administration: 150 mg (5/18/2023), 150 mg (6/29/2023), 150 mg (7/20/2023), 150 mg (6/8/2023)  nivolumab (OPDIVO) IVPB, 360 mg (150 % of original dose 240 mg), Intravenous, Once, 4 of 4 cycles  Dose modification: 360 mg (original dose 240 mg, Cycle 1, Reason: Dose modified as per discussion with consulting physician)  Administration: 360 mg (5/18/2023), 360 mg (6/8/2023), 360 mg (6/29/2023), 360 mg (7/20/2023)  CARBOplatin (PARAPLATIN) IVPB ([facility] AUC DOSING),  642.5 mg, Intravenous, Once, 4 of 4 cycles  Administration: 642.5 mg (5/18/2023), 642.5 mg (6/29/2023), 566.5 mg (7/20/2023), 650 mg (6/8/2023)  pemetrexed (ALIMTA) chemo infusion, 980 mg, Intravenous, Once, 4 of 4 cycles  Administration: 1,000 mg (5/18/2023), 1,000 mg (6/29/2023), 1,000 mg (7/20/2023), 1,000 mg (6/8/2023)     10/11/2023 - 10/11/2023 Chemotherapy    alteplase (CATHFLO), 2 mg, Intracatheter, Every 1 Minute as needed, 0 of 6 cycles  nivolumab (OPDIVO) IVPB, 240 mg, Intravenous, Once, 0 of 6 cycles     10/13/2023 - 2/23/2024 Chemotherapy    alteplase (CATHFLO), 2 mg, Intracatheter, Every 1 Minute as needed, 5 of 8 cycles  nivolumab (OPDIVO) IVPB, 480 mg, Intravenous, Once, 5 of 8 cycles  Administration: 480 mg (10/13/2023), 480 mg (11/10/2023), 480 mg (12/8/2023), 480 mg (1/5/2024), 480 mg (2/23/2024)     4/16/2024 Biopsy    ENDOBRONCHIAL ULTRASOUND (EBUS)     A-C. Lymph Node, Level 4R (ThinPrep, smear and cell block preparations):    - Metastatic non-small cell carcinoma, most compatible with lung primary; see note.    - Satisfactory for evaluation.     D-F. Lymph Node, Level 2R (ThinPrep, smear and cell block preparations):    - Metastatic non-small cell carcinoma.    - Non-small cell carcinoma, favor adenocarcinoma.    - Satisfactory for evaluation.     G. Lymph Node, Level 11R:    - Atypical cellular changes seen.    - Rare atypical epithelioid cells in a background of abundant benign bronchial cells.    - Lymphocytes present, compatible with adequate lymph node sampling.     5/15/2024 -  Chemotherapy    alteplase (CATHFLO), 2 mg, Intracatheter, Every 1 Minute as needed, 5 of 10 cycles  pegfilgrastim (NEULASTA), 6 mg, Subcutaneous, Once, 1 of 1 cycle  Administration: 6 mg (6/28/2024)  pegfilgrastim (NEULASTA ONPRO), 6 mg, Subcutaneous, Once, 5 of 10 cycles  Administration: 6 mg (5/15/2024), 6 mg (6/27/2024), 6 mg (6/6/2024), 6 mg (7/18/2024), 6 mg (8/8/2024)  bevacizumab (AVASTIN) IVPB, 5 mg/kg =  457.5 mg (100 % of original dose 5 mg/kg), Intravenous, Once, 3 of 3 cycles  Dose modification: 5 mg/kg (original dose 5 mg/kg, Cycle 1), 5 mg/kg (original dose 5 mg/kg, Cycle 1), 5 mg/kg (original dose 5 mg/kg, Cycle 2)  Administration: 457.5 mg (5/15/2024), 457.5 mg (5/30/2024), 457.5 mg (6/13/2024), 457.5 mg (6/27/2024)  DOCEtaxel (TAXOTERE) chemo infusion, 75 mg/m2 = 155.2 mg, Intravenous, Once, 5 of 10 cycles  Dose modification: 60 mg/m2 (original dose 75 mg/m2, Cycle 6, Reason: Dose Not Tolerated)  Administration: 155.2 mg (5/15/2024), 155.2 mg (6/27/2024), 155.2 mg (6/6/2024), 155.2 mg (7/18/2024), 155.2 mg (8/8/2024)     Carcinoma of right lung (HCC)   4/13/2023 Initial Diagnosis    Carcinoma of right lung (HCC)     4/13/2023 -  Cancer Staged    Staging form: Lung, AJCC 8th Edition  - Clinical: Stage JAY (cT3, cN1, cM1b) - Signed by James Contreras MD on 4/13/2023 4/19/2023 - 4/28/2023 Radiation    Plan ID Energy Fractions Dose per Fraction (cGy) Dose Correction (cGy) Total Dose Delivered (cGy) Elapsed Days   SRT R Frontal 6X-FFF 5 / 5 600 0 3,000 9         5/18/2023 - 7/20/2023 Chemotherapy    cyanocobalamin, 1,000 mcg, Intramuscular, Once, 3 of 3 cycles  Administration: 1,000 mcg (6/29/2023), 1,000 mcg (5/18/2023), 1,000 mcg (7/20/2023)  alteplase (CATHFLO), 2 mg, Intracatheter, Every 1 Minute as needed, 4 of 4 cycles  pegfilgrastim (NEULASTA ONPRO), 6 mg, Subcutaneous, Once, 3 of 3 cycles  Administration: 6 mg (6/8/2023), 6 mg (6/29/2023), 6 mg (7/20/2023)  fosaprepitant (EMEND) IVPB, 150 mg, Intravenous, Once, 4 of 4 cycles  Administration: 150 mg (5/18/2023), 150 mg (6/29/2023), 150 mg (7/20/2023), 150 mg (6/8/2023)  nivolumab (OPDIVO) IVPB, 360 mg (150 % of original dose 240 mg), Intravenous, Once, 4 of 4 cycles  Dose modification: 360 mg (original dose 240 mg, Cycle 1, Reason: Dose modified as per discussion with consulting physician)  Administration: 360 mg (5/18/2023), 360 mg (6/8/2023),  360 mg (6/29/2023), 360 mg (7/20/2023)  CARBOplatin (PARAPLATIN) IVPB (GOG AUC DOSING), 642.5 mg, Intravenous, Once, 4 of 4 cycles  Administration: 642.5 mg (5/18/2023), 642.5 mg (6/29/2023), 566.5 mg (7/20/2023), 650 mg (6/8/2023)  pemetrexed (ALIMTA) chemo infusion, 980 mg, Intravenous, Once, 4 of 4 cycles  Administration: 1,000 mg (5/18/2023), 1,000 mg (6/29/2023), 1,000 mg (7/20/2023), 1,000 mg (6/8/2023)     9/1/2023 -  Cancer Staged    Staging form: Lung, AJCC 8th Edition  - Pathologic stage from 9/1/2023: Stage JAY (pT2b, pN2, cM1b) - Signed by Treva Bah PA-C on 9/20/2023  Histopathologic type: Adenocarcinoma, NOS  Stage prefix: Initial diagnosis  Histologic grade (G): G3  Histologic grading system: 4 grade system       9/1/2023 Surgery    Right robotic converted to open upper lobectomy      9/1/2023 Biopsy    A.  Lung, right upper lobe:     - Invasive poorly differentiated solid adenocarcinoma of the lung, 4.4 cm.     - Tumor invades into, but not through, the visceral pleura (PL1), confirmed by special VVG stains.     - Lymphatic channel invasion by tumor identified.     - Metastatic carcinoma present in three of thirteen lymph nodes (3/13).       -- Rare fibrotic granulomas present.     - All margins are negative for tumor.     - Emphysematous changes.     B.  Lymph node, level 8:     - Negative for malignancy (0/1).     C.  Lymph node, level 7, #1:     - Negative for malignancy (0/1).     D.  Lymph node, level 4R:     - Negative for malignancy (0/1).     E.  Lymph node, level 4R, #2:     - Fibrovascular adipose tissue; negative for malignancy.     - No lymphoid tissue identified.     F.  Lymph node, level 2R, #1:     - Metastatic carcinoma present in one of three lymph nodes (1/3).     G.  Lymph node, level 2R, #2:     - Negative for malignancy (0/1).     H.  Lymph node, level 11 posterior:     - Single lymph node positive for metastatic carcinoma (1/1).     I.  Lymph node, portion of level 12 on  artery:     - Negative for malignancy (0/1).     - Non-caseating granulomatous inflammation present.        -- Special stains for acid fast bacilli and fungal organisms are negative.        10/11/2023 - 10/11/2023 Chemotherapy    alteplase (CATHFLO), 2 mg, Intracatheter, Every 1 Minute as needed, 0 of 6 cycles  nivolumab (OPDIVO) IVPB, 240 mg, Intravenous, Once, 0 of 6 cycles     10/13/2023 - 2/23/2024 Chemotherapy    alteplase (CATHFLO), 2 mg, Intracatheter, Every 1 Minute as needed, 5 of 8 cycles  nivolumab (OPDIVO) IVPB, 480 mg, Intravenous, Once, 5 of 8 cycles  Administration: 480 mg (10/13/2023), 480 mg (11/10/2023), 480 mg (12/8/2023), 480 mg (1/5/2024), 480 mg (2/23/2024)     4/16/2024 Biopsy    ENDOBRONCHIAL ULTRASOUND (EBUS)     A-C. Lymph Node, Level 4R (ThinPrep, smear and cell block preparations):    - Metastatic non-small cell carcinoma, most compatible with lung primary; see note.    - Satisfactory for evaluation.     D-F. Lymph Node, Level 2R (ThinPrep, smear and cell block preparations):    - Metastatic non-small cell carcinoma.    - Non-small cell carcinoma, favor adenocarcinoma.    - Satisfactory for evaluation.     G. Lymph Node, Level 11R:    - Atypical cellular changes seen.    - Rare atypical epithelioid cells in a background of abundant benign bronchial cells.    - Lymphocytes present, compatible with adequate lymph node sampling.     5/15/2024 -  Chemotherapy    alteplase (CATHFLO), 2 mg, Intracatheter, Every 1 Minute as needed, 5 of 10 cycles  pegfilgrastim (NEULASTA), 6 mg, Subcutaneous, Once, 1 of 1 cycle  Administration: 6 mg (6/28/2024)  pegfilgrastim (NEULASTA ONPRO), 6 mg, Subcutaneous, Once, 5 of 10 cycles  Administration: 6 mg (5/15/2024), 6 mg (6/27/2024), 6 mg (6/6/2024), 6 mg (7/18/2024), 6 mg (8/8/2024)  bevacizumab (AVASTIN) IVPB, 5 mg/kg = 457.5 mg (100 % of original dose 5 mg/kg), Intravenous, Once, 3 of 3 cycles  Dose modification: 5 mg/kg (original dose 5 mg/kg, Cycle 1), 5  "mg/kg (original dose 5 mg/kg, Cycle 1), 5 mg/kg (original dose 5 mg/kg, Cycle 2)  Administration: 457.5 mg (5/15/2024), 457.5 mg (5/30/2024), 457.5 mg (6/13/2024), 457.5 mg (6/27/2024)  DOCEtaxel (TAXOTERE) chemo infusion, 75 mg/m2 = 155.2 mg, Intravenous, Once, 5 of 10 cycles  Dose modification: 60 mg/m2 (original dose 75 mg/m2, Cycle 6, Reason: Dose Not Tolerated)  Administration: 155.2 mg (5/15/2024), 155.2 mg (6/27/2024), 155.2 mg (6/6/2024), 155.2 mg (7/18/2024), 155.2 mg (8/8/2024)     9/9/2024 - 10/21/2024 Radiation    Treatments:  Course: C2    Plan ID Energy Fractions Dose per Fraction (cGy) Dose Correction (cGy) Total Dose Delivered (cGy) Elapsed Days   R LUNG Med 6X 30 / 30 200 0 6,000 42      Treatment Dates:  9/9/2024 - 10/21/2024.                   Objective   There were no vitals taken for this visit.  No exam performed for this telephone follow-up.    Administrative Statements   I have spent a total time of 11 minutes in caring for this patient on the day of the visit/encounter including Counseling / Coordination of care, Documenting in the medical record, Reviewing / ordering tests, medicine, procedures  , and Obtaining or reviewing history  .   Portions of the record may have been created with voice recognition software.  Occasional wrong word or \"sound a like\" substitutions may have occurred due to the inherent limitations of voice recognition software.  Read the chart carefully and recognize, using context, where substitutions have occurred.  "

## 2024-11-22 ENCOUNTER — HOSPITAL ENCOUNTER (OUTPATIENT)
Dept: INFUSION CENTER | Facility: HOSPITAL | Age: 69
End: 2024-11-22
Payer: COMMERCIAL

## 2024-11-22 VITALS — TEMPERATURE: 96.9 F

## 2024-11-22 DIAGNOSIS — Z45.2 ENCOUNTER FOR CENTRAL LINE CARE: Primary | ICD-10-CM

## 2024-11-22 DIAGNOSIS — C79.9 METASTATIC ADENOCARCINOMA (HCC): ICD-10-CM

## 2024-11-22 DIAGNOSIS — T45.1X5A CHEMOTHERAPY-INDUCED NEUTROPENIA (HCC): ICD-10-CM

## 2024-11-22 DIAGNOSIS — D70.1 CHEMOTHERAPY-INDUCED NEUTROPENIA (HCC): ICD-10-CM

## 2024-11-22 DIAGNOSIS — C34.91 CARCINOMA OF RIGHT LUNG (HCC): ICD-10-CM

## 2024-11-22 LAB
ALBUMIN SERPL BCG-MCNC: 4.5 G/DL (ref 3.5–5)
ALP SERPL-CCNC: 79 U/L (ref 34–104)
ALT SERPL W P-5'-P-CCNC: 13 U/L (ref 7–52)
ANION GAP SERPL CALCULATED.3IONS-SCNC: 4 MMOL/L (ref 4–13)
AST SERPL W P-5'-P-CCNC: 19 U/L (ref 13–39)
BASOPHILS # BLD AUTO: 0.06 THOUSANDS/ÂΜL (ref 0–0.1)
BASOPHILS NFR BLD AUTO: 1 % (ref 0–1)
BILIRUB SERPL-MCNC: 0.37 MG/DL (ref 0.2–1)
BUN SERPL-MCNC: 18 MG/DL (ref 5–25)
CALCIUM SERPL-MCNC: 8.8 MG/DL (ref 8.4–10.2)
CHLORIDE SERPL-SCNC: 108 MMOL/L (ref 96–108)
CO2 SERPL-SCNC: 30 MMOL/L (ref 21–32)
CREAT SERPL-MCNC: 0.72 MG/DL (ref 0.6–1.3)
EOSINOPHIL # BLD AUTO: 0.35 THOUSAND/ÂΜL (ref 0–0.61)
EOSINOPHIL NFR BLD AUTO: 5 % (ref 0–6)
ERYTHROCYTE [DISTWIDTH] IN BLOOD BY AUTOMATED COUNT: 13.9 % (ref 11.6–15.1)
GFR SERPL CREATININE-BSD FRML MDRD: 93 ML/MIN/1.73SQ M
GLUCOSE P FAST SERPL-MCNC: 119 MG/DL (ref 65–99)
GLUCOSE SERPL-MCNC: 119 MG/DL (ref 65–140)
HCT VFR BLD AUTO: 40.2 % (ref 36.5–49.3)
HGB BLD-MCNC: 12.8 G/DL (ref 12–17)
IMM GRANULOCYTES # BLD AUTO: 0.04 THOUSAND/UL (ref 0–0.2)
IMM GRANULOCYTES NFR BLD AUTO: 1 % (ref 0–2)
LYMPHOCYTES # BLD AUTO: 1.09 THOUSANDS/ÂΜL (ref 0.6–4.47)
LYMPHOCYTES NFR BLD AUTO: 16 % (ref 14–44)
MAGNESIUM SERPL-MCNC: 1.9 MG/DL (ref 1.9–2.7)
MCH RBC QN AUTO: 28.8 PG (ref 26.8–34.3)
MCHC RBC AUTO-ENTMCNC: 31.8 G/DL (ref 31.4–37.4)
MCV RBC AUTO: 91 FL (ref 82–98)
MONOCYTES # BLD AUTO: 0.49 THOUSAND/ÂΜL (ref 0.17–1.22)
MONOCYTES NFR BLD AUTO: 7 % (ref 4–12)
NEUTROPHILS # BLD AUTO: 4.81 THOUSANDS/ÂΜL (ref 1.85–7.62)
NEUTS SEG NFR BLD AUTO: 70 % (ref 43–75)
NRBC BLD AUTO-RTO: 0 /100 WBCS
PLATELET # BLD AUTO: 200 THOUSANDS/UL (ref 149–390)
PMV BLD AUTO: 9.6 FL (ref 8.9–12.7)
POTASSIUM SERPL-SCNC: 4 MMOL/L (ref 3.5–5.3)
PROT SERPL-MCNC: 7.2 G/DL (ref 6.4–8.4)
RBC # BLD AUTO: 4.44 MILLION/UL (ref 3.88–5.62)
SODIUM SERPL-SCNC: 142 MMOL/L (ref 135–147)
WBC # BLD AUTO: 6.84 THOUSAND/UL (ref 4.31–10.16)

## 2024-11-22 PROCEDURE — 83735 ASSAY OF MAGNESIUM: CPT | Performed by: INTERNAL MEDICINE

## 2024-11-22 PROCEDURE — 85025 COMPLETE CBC W/AUTO DIFF WBC: CPT

## 2024-11-22 PROCEDURE — 80053 COMPREHEN METABOLIC PANEL: CPT | Performed by: INTERNAL MEDICINE

## 2024-11-22 NOTE — PROGRESS NOTES
Paras Pitts  tolerated routine lab draw via port well with no complications.      Paras Pitts is aware of future appt on  teto 3 at 1000    AVS printed and given to Paras Pitts:  No (Declined by Paras Pitts) card given

## 2024-12-09 DIAGNOSIS — I10 HYPERTENSION: ICD-10-CM

## 2024-12-09 DIAGNOSIS — G40.109 FOCAL EPILEPSY ORIGINATING IN FRONTAL LOBE (HCC): ICD-10-CM

## 2024-12-09 DIAGNOSIS — E78.00 HYPERCHOLESTEROLEMIA: ICD-10-CM

## 2024-12-09 DIAGNOSIS — I10 ESSENTIAL HYPERTENSION: ICD-10-CM

## 2024-12-09 DIAGNOSIS — Z76.0 MEDICATION REFILL: ICD-10-CM

## 2024-12-10 RX ORDER — ATORVASTATIN CALCIUM 40 MG/1
40 TABLET, FILM COATED ORAL
Qty: 30 TABLET | Refills: 0 | Status: SHIPPED | OUTPATIENT
Start: 2024-12-10

## 2024-12-10 RX ORDER — AMLODIPINE BESYLATE 10 MG/1
10 TABLET ORAL DAILY
Qty: 30 TABLET | Refills: 5 | Status: SHIPPED | OUTPATIENT
Start: 2024-12-10

## 2024-12-10 RX ORDER — LOSARTAN POTASSIUM 50 MG/1
50 TABLET ORAL DAILY
Qty: 30 TABLET | Refills: 5 | Status: SHIPPED | OUTPATIENT
Start: 2024-12-10

## 2024-12-10 RX ORDER — LACOSAMIDE 200 MG/1
200 TABLET ORAL EVERY 12 HOURS SCHEDULED
Qty: 60 TABLET | Refills: 0 | Status: SHIPPED | OUTPATIENT
Start: 2024-12-10

## 2024-12-10 NOTE — TELEPHONE ENCOUNTER
Patient Id Prescription # Filled Written Drug Label Qty Days Strength MME** Prescriber Pharmacy Payment REFILL #/Auth State Detail  1 [id number] 11/11/2024 11/11/2024 Lacosamide (Tablet) 60.0 30 200 MG NA [facility]liv[facility]KAYLA [facility] Commercial Insurance 0 / 0 PA   1 [id number] 10/10/2024 10/10/2024 Lacosamide (Tablet) 60.0 30 200 MG NA [facility] Commercial Insurance 0 / 5 PA   1 [id number] 09/04/2024 09/04/2024 clonazePAM (Tablet) 5.0 5 1 MG NA [facility] Commercial Insurance 0 / 0 PA   1 [id number] 09/04/2024 09/04/2024 Lacosamide (Tablet) 60.0 30 200 MG NA [facility] Commercial Insurance 0 / 5 PA   1 [id number] 05/24/2024 05/23/2024 cloBAZam (Tablet) 10.0 10 10 MG NA [facility] Commercial Insurance 0 / 0 PA   1 [id number] 05/24/2024 05/23/2024 Lacosamide (Tablet) 20.0 10 200 MG NA [facility] Commercial Insurance 0 / 0 PA

## 2024-12-11 ENCOUNTER — OFFICE VISIT (OUTPATIENT)
Dept: NEUROLOGY | Facility: CLINIC | Age: 69
End: 2024-12-11
Payer: COMMERCIAL

## 2024-12-11 ENCOUNTER — TELEPHONE (OUTPATIENT)
Dept: NEUROLOGY | Facility: CLINIC | Age: 69
End: 2024-12-11

## 2024-12-11 VITALS
BODY MASS INDEX: 29.21 KG/M2 | WEIGHT: 197.2 LBS | SYSTOLIC BLOOD PRESSURE: 150 MMHG | HEART RATE: 73 BPM | DIASTOLIC BLOOD PRESSURE: 82 MMHG | HEIGHT: 69 IN | OXYGEN SATURATION: 99 %

## 2024-12-11 DIAGNOSIS — G40.109 FOCAL EPILEPSY ORIGINATING IN FRONTAL LOBE (HCC): Primary | ICD-10-CM

## 2024-12-11 PROCEDURE — 99214 OFFICE O/P EST MOD 30 MIN: CPT | Performed by: STUDENT IN AN ORGANIZED HEALTH CARE EDUCATION/TRAINING PROGRAM

## 2024-12-11 NOTE — TELEPHONE ENCOUNTER
Pt called will be 10mins late for appointment today with Dr Martínez at 9am. Called [facility] office spoke with Anushka and informed her.

## 2024-12-11 NOTE — PROGRESS NOTES
Epilepsy Ambulatory Visit  Name: Paras Pitts       : 1955       MRN: [id number]   Encounter Provider: Addie Martínez MD   Encounter Date: 2024  Encounter department: [facility]    Paras Pitts is a right-handed 69 y.o. male with lung adenocarcinoma and right frontal metastasis s/p resection, c/b focal status epilepticus in April, who presents for follow-up of Epilepsy. He has been seizure free on levetiracetam and lacosamide, and is interested in decreasing his medication.     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    I explained to the patient that the law states that all patients who have a seizure must be reported to the [facility].  Patients cannot legally drive for 6 months after seizure in the [facility].         RTC 3 mo       INTERVAL HISTORY  He reports no side effects and no seizures since last visit.     His insurance denied continuing physical therapy so he has been working out at the gym and getting stronger on his left side. He has some soreness after he works out. In his house, he ambulates without a  cane but uses a cane when outside. He does still struggle with stairs.     He is interested in getting back to driving.       Epilepsy Risk Factors: Patient is a product of uncomplicated pregnancy, full term spontaneous vaginal delivery, met appropriate development milestones. No learning disabilities or cognitive delay. No h/o febrile seizures. Stroke and CNS tumor as above.  There is no family h/o of seizures or epilepsy.     Prior Work-up:   MRI Brain (4/21/24): Personally reviewed by me, and shows: Right superior frontal enhancing lesion with surrounding vasogenic edema, close to motor strip.   IMPRESSION: Right superior frontal mass resection and chemoradiation with unchanged linear enhancement along the surgical cavity compared to 3/26/2024 favoring radiation necrosis. Residual/recurrent disease is felt to be less likely. Stable surrounding hyperintense. T2/FLAIR signal likely related to a combination of posttreatment changes and resolving vasogenic edema. There is no new suspicious intra-axial lesion.  VEEGs: 4/22-4/23/24  Summary of monitoring: This concludes 37 hours of continuous video EEG monitoring from 4/22/2024 22:27 through 4/24/2024 1133. Early in recording there was frequent or nearly continuous focal motor seizure (periodic central midline discharges consistently correlating with left leg jerks). Focal motor seizure stopped near 4/22 at 10:00 (periodic discharges remained unchanged, but had no clinical correlate). There were two subclinical right fronto central electrographic seizures (4/23 at 13:12, 19:21).   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 His head and face weren't involved. A customer walked in and called the ambulance because he was  having difficulty breathing. The shaking may have been over 30 minutes. It would come and go. He never lost consciousness or awareness.     In the hospital, he was found to be in focal status epilepticus of the left hemibody and was started on Keppra, Onfi, and Vimpat with resolution of seizures. He subsequently have breakthrough seizures because he had run out of his medication.       Past Medical History:    Past Medical History:   Diagnosis Date    Brain compression (HCC) 03/20/2023    Brain mass 03/20/2023    Cancer (HCC) 2001    Receint lung and brain lesions and prostate cancer in 2001    Cerebral edema (HCC) 03/20/2023    Hypertension     Lung cancer (HCC)     Prostate cancer (HCC) 2001    Rectal bleeding     Stroke (HCC)     2011       Past Surgical History:   Procedure Laterality Date    COLONOSCOPY      CRANIOTOMY Right 3/23/2023    Procedure: Right frontal CRANIOTOMY IMAGE-GUIDED FOR TUMOR;  Surgeon: Clark Carrillo MD;  [facility];  Service: Neurosurgery    ENDOBRONCHIAL ULTRASOUND (EBUS) N/A 4/16/2024    Procedure: ENDOBRONCHIAL ULTRASOUND (EBUS);  Surgeon: Kalia Sparrow MD;  [facility];  Service: Thoracic    IR PORT PLACEMENT  4/27/2023    DC [facility] INCL FLUOR GDNCE DX W/CELL WASHG SPX N/A 9/1/2023    Procedure: BRONCHOSCOPY FLEXIBLE;  Surgeon: Kalia Sparrow MD;  [facility];  Service: Thoracic    DC BRNCHSC INCL FLUOR GDNCE DX W/CELL WASHG SPX N/A 4/16/2024    Procedure: BRONCHOSCOPY FLEXIBLE;  Surgeon: Kalia Sparrow MD;  [facility];  Service: Thoracic    DC CYSTOURETHROSCOPY N/A 3/23/2023    Procedure: EUA, DEL CASTILLO INSERTION;  Surgeon: Ajay Piedra MD;  [facility];  Service: Urology    DC THORACOSCOPY W/LOBECTOMY SINGLE LOBE Right 9/1/2023    Procedure: robotic assisted converted to open right upper lobectomy, lymph node dissection;  Surgeon: Kalia Sparrow MD;  [facility];  Service: Thoracic    PROSTATECTOMY  2001     THORACOTOMY Right 2023    Procedure: right thoracotomy with Cryo-Ablation;  Surgeon: Kalia Sparrow MD;  [facility];  Service: Thoracic    TONSILLECTOMY         Family History:  Family History   Problem Relation Age of Onset    Dementia Mother             Breast cancer Sister             Prostate cancer Brother         Received Radiation       Social History:  Social History     Tobacco Use    Smoking status: Former     Current packs/day: 0.00     Average packs/day: 1 pack/day for 15.0 years (15.0 ttl pk-yrs)     Types: Cigarettes     Start date:      Quit date:      Years since quittin.9     Passive exposure: Never    Smokeless tobacco: Never    Tobacco comments:     i quit when i was 28. not sure of exact dates   Vaping Use    Vaping status: Never Used   Substance Use Topics    Alcohol use: Not Currently     Alcohol/week: 6.0 standard drinks of alcohol     Types: 6 Cans of beer per week     Comment: socially    Drug use: Not Currently     Types: Marijuana     Comment: gummies foro nausea with chemo        Allergies:  No Known Allergies    Medications:    Current Outpatient Medications:     acetaminophen (TYLENOL) 325 mg tablet, Take 2 tablets (650 mg total) by mouth every 6 (six) hours as needed for mild pain or fever, Disp: , Rfl:     amLODIPine (NORVASC) 10 mg tablet, Take 1 tablet (10 mg total) by mouth daily, Disp: 30 tablet, Rfl: 5    atorvastatin (LIPITOR) 40 mg tablet, Take 1 tablet (40 mg total) by mouth daily after dinner, Disp: 30 tablet, Rfl: 0    diphenhydramine, lidocaine, Al/Mg hydroxide, simethicone (Magic Mouthwash) SUSP, Swish and spit 10 mL every 4 (four) hours as needed for mouth pain or discomfort, Disp: 480 mL, Rfl: 3    lacosamide (VIMPAT) 200 mg tablet, Take 1 tablet (200 mg total) by mouth every 12 (twelve) hours, Disp: 60 tablet, Rfl: 0    levETIRAcetam (Keppra) 1000 MG tablet, Take 2 tablets (2,000 mg total) by mouth 2 (two) times a day,  "Disp: 120 tablet, Rfl: 5    loperamide (IMODIUM A-D) 2 MG tablet, Take 2 mg by mouth 4 (four) times a day as needed for diarrhea. Indications: Diarrhea caused by Chemotherapy, Disp: , Rfl:     losartan (COZAAR) 50 mg tablet, Take 1 tablet (50 mg total) by mouth daily, Disp: 30 tablet, Rfl: 5    clonazePAM (KlonoPIN) 1 mg tablet, Take 1 tablet (1 mg total) by mouth if needed for seizures (Patient not taking: Reported on 12/11/2024), Disp: 5 tablet, Rfl: 0    ipratropium-albuterol (DUO-NEB) 0.5-2.5 mg/3 mL nebulizer solution, Take 3 mL by nebulization 3 (three) times a day (Patient not taking: Reported on 12/11/2024), Disp: 270 mL, Rfl: 0    Oral Wound Care Products (MuGard) LIQD, Apply 5 mL to the mouth or throat 2 (two) times a day Allow to dwellin mouth for at least one min may swallow (Patient not taking: Reported on 10/17/2024), Disp: 240 mL, Rfl: 0    pantoprazole (PROTONIX) 40 mg tablet, Take 1 tablet (40 mg total) by mouth daily (Patient not taking: Reported on 12/11/2024), Disp: 30 tablet, Rfl: 11    pramipexole (MIRAPEX) 0.25 mg tablet, Take 1 tablet (0.25 mg total) by mouth 3 (three) times a day (Patient not taking: Reported on 10/17/2024), Disp: 90 tablet, Rfl: 0      OBJECTIVE  /82 (BP Location: Right arm, Patient Position: Sitting, Cuff Size: Large)   Pulse 73   Ht 5' 9\" (1.753 m)   Wt 89.4 kg (197 lb 3.2 oz)   SpO2 99%   BMI 29.12 kg/m²      Labs  I have reviewed pertinent labs:  CBC:   Lab Results   Component Value Date    WBC 6.84 11/22/2024    RBC 4.44 11/22/2024    HGB 12.8 11/22/2024    HCT 40.2 11/22/2024    MCV 91 11/22/2024     11/22/2024    MCH 28.8 11/22/2024    MCHC 31.8 11/22/2024    RDW 13.9 11/22/2024    MPV 9.6 11/22/2024    NEUTROABS 4.81 11/22/2024     CMP:   Lab Results   Component Value Date    SODIUM 142 11/22/2024    K 4.0 11/22/2024     11/22/2024    CO2 30 11/22/2024    AGAP 4 11/22/2024    BUN 18 11/22/2024    CREATININE 0.72 11/22/2024    GLUC 119 " 11/22/2024    GLUF 119 (H) 11/22/2024    CALCIUM 8.8 11/22/2024    AST 19 11/22/2024    ALT 13 11/22/2024    ALKPHOS 79 11/22/2024    TP 7.2 11/22/2024    ALB 4.5 11/22/2024    TBILI 0.37 11/22/2024    EGFR 93 11/22/2024       Lab Results   Component Value Date/Time    OBBADALT90 585 05/10/2018 01:39 PM    MPDO52VHXDZV 15.20 05/10/2018 01:39 PM    TSH 3.73 09/12/2022 11:45 AM    QRF6ZCTYAHZC 3.887 06/05/2024 09:22 AM    IVB3HEHAILOB 1.52 05/10/2018 01:39 PM    FREET4 0.82 02/22/2024 10:07 AM    FREET4 0.97 05/10/2018 01:39 PM    HGBA1C 6.7 (H) 05/02/2024 05:41 AM    CRP 9.0 (H) 07/19/2023 02:09 PM    ESR 36 (H) 07/19/2023 02:09 PM       Lab Results   Component Value Date/Time    LEVETIRACETA <2.0 (L) 07/02/2024 03:48 PM       General Exam  GENERAL APPEARANCE:  No distress, alert, interactive and cooperative.  CARDIOVASCULAR: Warm and well perfused  LUNGS: normal work of breathing on room air  EXTREMITIES: no peripheral edema     Neurologic Exam  Mental Status: Alert and oriented to person, place, and time.   Language: Fluent, comprehension intact.  Cranial Nerves: EOMI with no nystagmus. Face is symmetric. No dysarthria. Hearing is intact to conversation.    Motor:  Antigravity in all extremities. Left hemibody is 4+/5  Gait: Can ambulate without a cane but is unsteady and leans towards the right.       Administrative Statements   The total amount of time spent with the patient and on chart review and documentation was 35 minutes. Issues addressed during this clinic visit included counseling on seizure medications and discussion on ongoing goals and management.

## 2024-12-11 NOTE — PATIENT INSTRUCTIONS
It was a pleasure to see you today for epilepsy.     Continue taking the following medications:   Lacosamide (Vimpat) 200mg twice a day     Decrease your levetiracetam as follows:  Levetiracetam (Keppra) 1500mg twice a day     Call me in 2 weeks and if you have had no seizures, we can decrease your levetiracetam further.     If you need anything, please contact us at [phone], or contact me via [employer].     Sincerely,  Dr. Martínez     Helpful Information:   If you need to schedule any appointments, tests or imaging studies, the call center number is [phone].  The nearest lab is at [facility] at [address] [place] [address].   The closest [facility] for PT and OT is located at [address] [place] [address]. Their phone number is [phone]. Alternatively, the number for the Neuro Rehab Center in [place] ([address] [place] [address]) is [phone].   The nearest durable medical equipment store is [employer], located at [address] [place] [address].

## 2024-12-12 ENCOUNTER — APPOINTMENT (OUTPATIENT)
Age: 69
End: 2024-12-12
Payer: COMMERCIAL

## 2024-12-12 DIAGNOSIS — I10 ESSENTIAL HYPERTENSION: ICD-10-CM

## 2024-12-12 DIAGNOSIS — C34.91 CARCINOMA OF RIGHT LUNG (HCC): ICD-10-CM

## 2024-12-12 DIAGNOSIS — G40.909 SEIZURE DISORDER (HCC): ICD-10-CM

## 2024-12-12 LAB
ALBUMIN SERPL BCG-MCNC: 4.7 G/DL (ref 3.5–5)
ALP SERPL-CCNC: 83 U/L (ref 34–104)
ALT SERPL W P-5'-P-CCNC: 14 U/L (ref 7–52)
ANION GAP SERPL CALCULATED.3IONS-SCNC: 9 MMOL/L (ref 4–13)
AST SERPL W P-5'-P-CCNC: 20 U/L (ref 13–39)
BASOPHILS # BLD AUTO: 0.06 THOUSANDS/ÂΜL (ref 0–0.1)
BASOPHILS NFR BLD AUTO: 1 % (ref 0–1)
BILIRUB SERPL-MCNC: 0.74 MG/DL (ref 0.2–1)
BUN SERPL-MCNC: 14 MG/DL (ref 5–25)
CALCIUM SERPL-MCNC: 9.3 MG/DL (ref 8.4–10.2)
CHLORIDE SERPL-SCNC: 104 MMOL/L (ref 96–108)
CHOLEST SERPL-MCNC: 103 MG/DL (ref ?–200)
CO2 SERPL-SCNC: 30 MMOL/L (ref 21–32)
CREAT SERPL-MCNC: 0.76 MG/DL (ref 0.6–1.3)
EOSINOPHIL # BLD AUTO: 0.3 THOUSAND/ÂΜL (ref 0–0.61)
EOSINOPHIL NFR BLD AUTO: 6 % (ref 0–6)
ERYTHROCYTE [DISTWIDTH] IN BLOOD BY AUTOMATED COUNT: 14 % (ref 11.6–15.1)
EST. AVERAGE GLUCOSE BLD GHB EST-MCNC: 128 MG/DL
GFR SERPL CREATININE-BSD FRML MDRD: 91 ML/MIN/1.73SQ M
GLUCOSE P FAST SERPL-MCNC: 107 MG/DL (ref 65–99)
HBA1C MFR BLD: 6.1 %
HCT VFR BLD AUTO: 43.6 % (ref 36.5–49.3)
HDLC SERPL-MCNC: 38 MG/DL
HGB BLD-MCNC: 14.2 G/DL (ref 12–17)
IMM GRANULOCYTES # BLD AUTO: 0.03 THOUSAND/UL (ref 0–0.2)
IMM GRANULOCYTES NFR BLD AUTO: 1 % (ref 0–2)
LDLC SERPL CALC-MCNC: 49 MG/DL (ref 0–100)
LEVETIRACETAM SERPL-MCNC: 29.5 UG/ML (ref 12–46)
LYMPHOCYTES # BLD AUTO: 1.13 THOUSANDS/ÂΜL (ref 0.6–4.47)
LYMPHOCYTES NFR BLD AUTO: 21 % (ref 14–44)
MCH RBC QN AUTO: 28.9 PG (ref 26.8–34.3)
MCHC RBC AUTO-ENTMCNC: 32.6 G/DL (ref 31.4–37.4)
MCV RBC AUTO: 89 FL (ref 82–98)
MONOCYTES # BLD AUTO: 0.37 THOUSAND/ÂΜL (ref 0.17–1.22)
MONOCYTES NFR BLD AUTO: 7 % (ref 4–12)
NEUTROPHILS # BLD AUTO: 3.49 THOUSANDS/ÂΜL (ref 1.85–7.62)
NEUTS SEG NFR BLD AUTO: 64 % (ref 43–75)
NRBC BLD AUTO-RTO: 0 /100 WBCS
PLATELET # BLD AUTO: 205 THOUSANDS/UL (ref 149–390)
PMV BLD AUTO: 9.9 FL (ref 8.9–12.7)
POTASSIUM SERPL-SCNC: 3.7 MMOL/L (ref 3.5–5.3)
PROT SERPL-MCNC: 7.5 G/DL (ref 6.4–8.4)
RBC # BLD AUTO: 4.92 MILLION/UL (ref 3.88–5.62)
SODIUM SERPL-SCNC: 143 MMOL/L (ref 135–147)
TRIGL SERPL-MCNC: 82 MG/DL (ref ?–150)
TSH SERPL DL<=0.05 MIU/L-ACNC: 2.44 UIU/ML (ref 0.45–4.5)
WBC # BLD AUTO: 5.38 THOUSAND/UL (ref 4.31–10.16)

## 2024-12-12 PROCEDURE — 83036 HEMOGLOBIN GLYCOSYLATED A1C: CPT

## 2024-12-12 PROCEDURE — 80061 LIPID PANEL: CPT

## 2024-12-12 PROCEDURE — 36415 COLL VENOUS BLD VENIPUNCTURE: CPT

## 2024-12-12 PROCEDURE — 84443 ASSAY THYROID STIM HORMONE: CPT

## 2024-12-12 PROCEDURE — 80053 COMPREHEN METABOLIC PANEL: CPT

## 2024-12-12 PROCEDURE — 85025 COMPLETE CBC W/AUTO DIFF WBC: CPT

## 2024-12-12 PROCEDURE — 83520 IMMUNOASSAY QUANT NOS NONAB: CPT

## 2024-12-16 ENCOUNTER — OFFICE VISIT (OUTPATIENT)
Dept: FAMILY MEDICINE CLINIC | Facility: CLINIC | Age: 69
End: 2024-12-16
Payer: COMMERCIAL

## 2024-12-16 VITALS
SYSTOLIC BLOOD PRESSURE: 136 MMHG | TEMPERATURE: 97.1 F | WEIGHT: 198.8 LBS | HEART RATE: 79 BPM | DIASTOLIC BLOOD PRESSURE: 74 MMHG | HEIGHT: 69 IN | OXYGEN SATURATION: 96 % | BODY MASS INDEX: 29.44 KG/M2

## 2024-12-16 DIAGNOSIS — Z99.89 AMBULATES WITH CANE: ICD-10-CM

## 2024-12-16 DIAGNOSIS — G89.29 CHRONIC LEFT HIP PAIN: ICD-10-CM

## 2024-12-16 DIAGNOSIS — M25.552 CHRONIC LEFT HIP PAIN: ICD-10-CM

## 2024-12-16 DIAGNOSIS — Z00.00 ANNUAL PHYSICAL EXAM: Primary | ICD-10-CM

## 2024-12-16 DIAGNOSIS — G40.109 FOCAL EPILEPSY ORIGINATING IN FRONTAL LOBE (HCC): ICD-10-CM

## 2024-12-16 DIAGNOSIS — I10 ESSENTIAL HYPERTENSION: ICD-10-CM

## 2024-12-16 PROCEDURE — 99397 PER PM REEVAL EST PAT 65+ YR: CPT | Performed by: STUDENT IN AN ORGANIZED HEALTH CARE EDUCATION/TRAINING PROGRAM

## 2024-12-16 PROCEDURE — 99214 OFFICE O/P EST MOD 30 MIN: CPT | Performed by: STUDENT IN AN ORGANIZED HEALTH CARE EDUCATION/TRAINING PROGRAM

## 2024-12-16 RX ORDER — LEVETIRACETAM 1000 MG/1
1500 TABLET ORAL 2 TIMES DAILY
Start: 2024-12-16 | End: 2025-12-11

## 2024-12-16 NOTE — PROGRESS NOTES
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 Appropriate education was printed on patient's after visit summary.        Counseling:  Alcohol/drug use: discussed moderation in alcohol intake, the recommendations for healthy alcohol use, and avoidance of illicit drug use.  Dental Health: discussed importance of regular tooth brushing, flossing, and dental visits.  Injury prevention: discussed safety/seat belts, safety helmets, smoke detectors, carbon monoxide detectors, and smoking near bedding or upholstery.  Sexual health: discussed sexually transmitted diseases, partner selection, use of condoms, avoidance of unintended pregnancy, and contraceptive alternatives.  Exercise: the importance of regular exercise/physical activity was discussed. Recommend exercise 3-5 times per week for at least 30 minutes.          History of Present Illness     Adult Annual Physical:  Patient presents for annual physical.     Diet and Physical Activity:  - Diet/Nutrition: well balanced diet.  - Exercise: no formal exercise and walking.    Depression Screening:  - PHQ-2 Score: 0    General Health:  - Sleep: sleeps well.  - Hearing: normal  "hearing right ear and normal hearing left ear.  - Vision: no vision problems.  - Dental: regular dental visits.     Health:  - History of STDs: no.   - Urinary symptoms: none.     Advanced Care Planning:  - Has an advanced directive?: no    - Has a durable medical POA?: no    - ACP document given to patient?: no      Review of Systems   Constitutional:  Negative for activity change, appetite change, chills, fatigue and fever.   HENT:  Negative for congestion, dental problem, drooling, ear discharge, ear pain, facial swelling, postnasal drip, rhinorrhea and sinus pain.    Eyes:  Negative for photophobia, pain, discharge and itching.   Respiratory:  Negative for apnea, cough, chest tightness and shortness of breath.    Cardiovascular:  Negative for chest pain and leg swelling.   Gastrointestinal:  Negative for abdominal distention, abdominal pain, anal bleeding, constipation, diarrhea and nausea.   Endocrine: Negative for cold intolerance, heat intolerance and polydipsia.   Genitourinary:  Negative for difficulty urinating.   Musculoskeletal:  Negative for arthralgias, gait problem, joint swelling and myalgias.   Skin:  Negative for color change and pallor.   Allergic/Immunologic: Negative for immunocompromised state.   Neurological:  Positive for weakness. Negative for dizziness, seizures, facial asymmetry, light-headedness, numbness and headaches.   Psychiatric/Behavioral:  Negative for agitation, behavioral problems, confusion, decreased concentration and dysphoric mood.    All other systems reviewed and are negative.          Objective   /74 (BP Location: Left arm, Patient Position: Sitting, Cuff Size: Adult)   Pulse 79   Temp (!) 97.1 °F (36.2 °C) (Tympanic)   Ht 5' 9\" (1.753 m)   Wt 90.2 kg (198 lb 12.8 oz)   SpO2 96%   BMI 29.36 kg/m²     Physical Exam  Constitutional:       Appearance: He is well-developed. He is obese.   HENT:      Head: Normocephalic.   Eyes:      Pupils: Pupils are equal, " round, and reactive to light.   Cardiovascular:      Rate and Rhythm: Normal rate and regular rhythm.   Pulmonary:      Effort: Pulmonary effort is normal.      Breath sounds: Normal breath sounds.   Abdominal:      General: Bowel sounds are normal.      Palpations: Abdomen is soft.   Musculoskeletal:         General: No swelling, tenderness, deformity or signs of injury. Normal range of motion.      Cervical back: Normal range of motion and neck supple.   Skin:     General: Skin is warm.   Neurological:      Mental Status: He is alert.

## 2024-12-26 NOTE — CASE MANAGEMENT
Patient : Allen Allen Age: 18 year old Sex: male   MRN: [id number] Encounter Date: 12/26/2024      History      Chief Complaint   Patient presents with    Chest Pain              Allen Allen is a 18 year old presenting to the emergency department for intermittent chest pain for the past year.  Patient states that he has pain every time he eats or drinks.  Pain will last for about an hour and then go away.  He has no shortness of breath, nausea, vomiting, abdominal pain.  He denies symptoms worsening with exertion.    I have reviewed Allen Allen's previous office visit note from 10/20/23  cardiology.  Note Review Summary: intermittent CP.  Ordered stress test but patient does not seem to have completed it.  Normal Echo in 2020.      Past/Family/Social History     No Known Allergies    No current facility-administered medications for this encounter.     Current Outpatient Medications   Medication Sig    hydrOXYzine (ATARAX) 25 MG tablet Take 1 tablet by mouth every 6 hours for 3 days.    ibuprofen (MOTRIN) 600 MG tablet Take 1 tablet by mouth every 6 hours as needed for Pain.    famotidine (Pepcid) 20 MG tablet Take 1 tablet by mouth in the morning and 1 tablet in the evening.    aluminum-magnesium hydroxide-simethicone (MAALOX) 200-200-20 MG/5ML Suspension Take 30 mLs by mouth every 4 hours as needed (chest pain, abdominal pain).    Methylphenidate HCl (QUILLICHEW ER) 30 MG Tablet Chewable Extended Release Chew 30 mg by mouth daily.       History reviewed. No pertinent past medical history.    History reviewed. No pertinent surgical history.    History reviewed. No pertinent family history.    Social History     Tobacco Use    Smoking status: Never    Smokeless tobacco: Never   Vaping Use    Vaping status: Former    Substances: THC   Substance Use Topics    Alcohol use: Not Currently     Comment: 1 shot daily for two weeks - not currently at this timr 12/4/23    Drug use: Yes     Types: Marijuana       Patient sleeping in chair while chemo infusion being administered. CM called patient's wife mary Collette, reviewed team meeting information from today. Mary Collette stated they do not need a ramp, as there is no step to enter from back patio. CM inquired about her coming in for family training, she is agreeable to this. She stated she could come in tomorrow 5/16 at 1:00 PM. CM made her aware training can also be done on Saturday.CM made her aware CM will be sending  a clinical update today, and that d/c date has been moved to 5/24, in order to give enough time for family training.   Clinical update review faxed to [facility] via [employer], awaiting determination.       Review of Systems   Review of Symptoms     Review of Systems   Constitutional:  Negative for chills, diaphoresis and fever.   HENT:  Negative for congestion, rhinorrhea and sore throat.    Eyes:  Negative for visual disturbance.   Respiratory:  Negative for cough and shortness of breath.    Cardiovascular:  Positive for chest pain. Negative for leg swelling.   Gastrointestinal:  Negative for abdominal pain, diarrhea, nausea and vomiting.   Genitourinary:  Negative for dysuria, flank pain, frequency and hematuria.   Musculoskeletal:  Negative for myalgias.   Skin:  Negative for rash.   Neurological:  Negative for dizziness, weakness, light-headedness and headaches.          Physical Exam   Physical Exam     ED Triage Vitals   ED Triage Vitals Group      Temp 12/26/24 0404 97.2 °F (36.2 °C)      Heart Rate 12/26/24 0404 82      Resp 12/26/24 0404 16      BP 12/26/24 0404 110/70      SpO2 12/26/24 0404 97 %      EtCO2 mmHg --       Height --       Weight 12/26/24 0400 115 lb 11.9 oz (52.5 kg)      Weight Scale Used --       BMI (Calculated) --       IBW/kg (Calculated) --        Physical Exam  Vitals and nursing note reviewed.   Constitutional:       Appearance: He is well-developed.   Eyes:      Conjunctiva/sclera: Conjunctivae normal.      Pupils: Pupils are equal, round, and reactive to light.   Cardiovascular:      Rate and Rhythm: Normal rate and regular rhythm.      Pulses: Normal pulses.      Heart sounds: Normal heart sounds.   Pulmonary:      Effort: Pulmonary effort is normal.      Breath sounds: Normal breath sounds.   Abdominal:      General: Bowel sounds are normal.      Palpations: Abdomen is soft.      Tenderness: There is no abdominal tenderness.   Musculoskeletal:         General: No tenderness. Normal range of motion.      Cervical back: Normal range of motion and neck supple.      Right lower leg: No edema.      Left lower leg: No edema.   Skin:     General: Skin is warm and dry.      Findings:  No rash.   Neurological:      Mental Status: He is alert and oriented to person, place, and time.      GCS: GCS eye subscore is 4. GCS verbal subscore is 5. GCS motor subscore is 6.      Cranial Nerves: No cranial nerve deficit.      Motor: Motor function is intact.              Procedures   ED Procedures     Procedures     Lab Results   ED Lab     No results found for this visit on 12/26/24.       EKG     [facility] EKG report   Results for orders placed or performed during the hospital encounter of 12/26/24   ECG   Result Value Ref Range    Ventricular Rate EKG/Min (BPM) 75     Atrial Rate (BPM) 75     FL-Interval (MSEC) 114     QRS-Interval (MSEC) 80     QT-Interval (MSEC) 350     QTc 391     P Axis (Degrees) 67     R Axis (Degrees) 80     T Axis (Degrees) 61     REPORT TEXT       Normal sinus rhythm  with sinus arrhythmia  Normal ECG  When compared with ECG of  13-DEC-2024 02:18,  No significant change was found  Confirmed by RODY DEL RIO, SAHARA SOSA ([id number]) on 12/26/2024 4:32:46 AM         Radiology Results   ED Radiology Results     Imaging Results    None            ED Medications   ED Medications     Medications - No data to display       ED Course     Vitals:    12/26/24 0400 12/26/24 0404 12/26/24 0421   BP:  110/70 116/66   Pulse:  82 79   Resp:  16 17   Temp:  97.2 °F (36.2 °C)    TempSrc:  Oral    SpO2:  97% 97%   Weight: 52.5 kg (115 lb 11.9 oz)       This patient is a 18-year-old male presenting with intermittent chest pain for the past year.  Pain is worse with eating.  He has had multiple ED visits for the same.  Exam is normal.  Plan for EKG and then discharge with GI follow-up.     EKG normal.  He will be prescribed Prilosec and instructed to use this daily.    No cardiac monitor or imaging reviewed        Consults                    Medical Decision Making                                      Patient is a 18 year old with complaint of chest pain.  My differential diagnosis includes but is not limited  to cardiac ischemia, musculoskeletal pain, GERD, pneumothorax, pericarditis, aortic dissection, CHF, pneumonia, PE The patient will not require admission. Pain is atypical for cardiac ischemia.         Does the patient have sepsis: NO     Critical Care       No Critical Care        Disposition       Clinical Impression and Diagnosis  7:15 AM       ED Diagnosis       Diagnosis Comment Associated Orders       Final diagnosis    Chest pain, unspecified type -- SERVICE TO GASTROENTEROLOGY              Follow Up:  Jaylan Willoughby MD  [address]  [address]  [facility] [address]  [phone]                Summary of your Discharge Medications        Take these Medications        Details   omeprazole 40 MG capsule  Commonly known as: PriLOSEC   Take 1 capsule by mouth daily.              Pt is discharged to home/self care in stable condition.                There is no disposition no dispo time  There is no comment                   Julia Arroyo MD  12/26/24 0716

## 2025-01-03 ENCOUNTER — HOSPITAL ENCOUNTER (OUTPATIENT)
Dept: INFUSION CENTER | Facility: HOSPITAL | Age: 70
End: 2025-01-03
Attending: INTERNAL MEDICINE
Payer: COMMERCIAL

## 2025-01-03 DIAGNOSIS — Z45.2 ENCOUNTER FOR CENTRAL LINE CARE: ICD-10-CM

## 2025-01-03 DIAGNOSIS — C79.9 METASTATIC ADENOCARCINOMA (HCC): Primary | ICD-10-CM

## 2025-01-03 DIAGNOSIS — T45.1X5A CHEMOTHERAPY-INDUCED NEUTROPENIA (HCC): ICD-10-CM

## 2025-01-03 DIAGNOSIS — C34.91 CARCINOMA OF RIGHT LUNG (HCC): ICD-10-CM

## 2025-01-03 DIAGNOSIS — D70.1 CHEMOTHERAPY-INDUCED NEUTROPENIA (HCC): ICD-10-CM

## 2025-01-03 LAB
ALBUMIN SERPL BCG-MCNC: 4.5 G/DL (ref 3.5–5)
ALP SERPL-CCNC: 89 U/L (ref 34–104)
ALT SERPL W P-5'-P-CCNC: 16 U/L (ref 7–52)
ANION GAP SERPL CALCULATED.3IONS-SCNC: 7 MMOL/L (ref 4–13)
AST SERPL W P-5'-P-CCNC: 21 U/L (ref 13–39)
BASOPHILS # BLD AUTO: 0.05 THOUSANDS/ΜL (ref 0–0.1)
BASOPHILS NFR BLD AUTO: 1 % (ref 0–1)
BILIRUB SERPL-MCNC: 0.66 MG/DL (ref 0.2–1)
BUN SERPL-MCNC: 12 MG/DL (ref 5–25)
CALCIUM SERPL-MCNC: 9.1 MG/DL (ref 8.4–10.2)
CHLORIDE SERPL-SCNC: 105 MMOL/L (ref 96–108)
CO2 SERPL-SCNC: 30 MMOL/L (ref 21–32)
CREAT SERPL-MCNC: 0.71 MG/DL (ref 0.6–1.3)
EOSINOPHIL # BLD AUTO: 0.29 THOUSAND/ΜL (ref 0–0.61)
EOSINOPHIL NFR BLD AUTO: 4 % (ref 0–6)
ERYTHROCYTE [DISTWIDTH] IN BLOOD BY AUTOMATED COUNT: 13.8 % (ref 11.6–15.1)
GFR SERPL CREATININE-BSD FRML MDRD: 94 ML/MIN/1.73SQ M
GLUCOSE SERPL-MCNC: 152 MG/DL (ref 65–140)
HCT VFR BLD AUTO: 42.6 % (ref 36.5–49.3)
HGB BLD-MCNC: 13.6 G/DL (ref 12–17)
IMM GRANULOCYTES # BLD AUTO: 0.08 THOUSAND/UL (ref 0–0.2)
IMM GRANULOCYTES NFR BLD AUTO: 1 % (ref 0–2)
LYMPHOCYTES # BLD AUTO: 1.31 THOUSANDS/ΜL (ref 0.6–4.47)
LYMPHOCYTES NFR BLD AUTO: 19 % (ref 14–44)
MAGNESIUM SERPL-MCNC: 1.8 MG/DL (ref 1.9–2.7)
MCH RBC QN AUTO: 28.9 PG (ref 26.8–34.3)
MCHC RBC AUTO-ENTMCNC: 31.9 G/DL (ref 31.4–37.4)
MCV RBC AUTO: 91 FL (ref 82–98)
MONOCYTES # BLD AUTO: 0.35 THOUSAND/ΜL (ref 0.17–1.22)
MONOCYTES NFR BLD AUTO: 5 % (ref 4–12)
NEUTROPHILS # BLD AUTO: 4.85 THOUSANDS/ΜL (ref 1.85–7.62)
NEUTS SEG NFR BLD AUTO: 70 % (ref 43–75)
NRBC BLD AUTO-RTO: 0 /100 WBCS
PLATELET # BLD AUTO: 207 THOUSANDS/UL (ref 149–390)
PMV BLD AUTO: 9.9 FL (ref 8.9–12.7)
POTASSIUM SERPL-SCNC: 3.7 MMOL/L (ref 3.5–5.3)
PROT SERPL-MCNC: 7.3 G/DL (ref 6.4–8.4)
RBC # BLD AUTO: 4.7 MILLION/UL (ref 3.88–5.62)
SODIUM SERPL-SCNC: 142 MMOL/L (ref 135–147)
WBC # BLD AUTO: 6.93 THOUSAND/UL (ref 4.31–10.16)

## 2025-01-03 PROCEDURE — 83735 ASSAY OF MAGNESIUM: CPT | Performed by: INTERNAL MEDICINE

## 2025-01-03 PROCEDURE — 85025 COMPLETE CBC W/AUTO DIFF WBC: CPT | Performed by: INTERNAL MEDICINE

## 2025-01-03 PROCEDURE — 80053 COMPREHEN METABOLIC PANEL: CPT | Performed by: INTERNAL MEDICINE

## 2025-01-06 DIAGNOSIS — C79.9 METASTATIC ADENOCARCINOMA (HCC): Primary | ICD-10-CM

## 2025-01-08 DIAGNOSIS — Z76.0 MEDICATION REFILL: ICD-10-CM

## 2025-01-08 DIAGNOSIS — I10 HYPERTENSION: ICD-10-CM

## 2025-01-08 DIAGNOSIS — I10 ESSENTIAL HYPERTENSION: ICD-10-CM

## 2025-01-08 DIAGNOSIS — G40.109 FOCAL EPILEPSY ORIGINATING IN FRONTAL LOBE (HCC): ICD-10-CM

## 2025-01-08 DIAGNOSIS — E78.00 HYPERCHOLESTEROLEMIA: ICD-10-CM

## 2025-01-09 RX ORDER — AMLODIPINE BESYLATE 10 MG/1
10 TABLET ORAL DAILY
Qty: 30 TABLET | Refills: 5 | Status: SHIPPED | OUTPATIENT
Start: 2025-01-09

## 2025-01-09 RX ORDER — LACOSAMIDE 200 MG/1
200 TABLET ORAL EVERY 12 HOURS SCHEDULED
Qty: 60 TABLET | Refills: 0 | Status: SHIPPED | OUTPATIENT
Start: 2025-01-09

## 2025-01-09 RX ORDER — LOSARTAN POTASSIUM 50 MG/1
50 TABLET ORAL DAILY
Qty: 30 TABLET | Refills: 5 | Status: SHIPPED | OUTPATIENT
Start: 2025-01-09

## 2025-01-09 RX ORDER — ATORVASTATIN CALCIUM 40 MG/1
40 TABLET, FILM COATED ORAL
Qty: 30 TABLET | Refills: 5 | Status: SHIPPED | OUTPATIENT
Start: 2025-01-09

## 2025-01-09 NOTE — TELEPHONE ENCOUNTER
Medication: Vimpat   PDMP  12/10/2024 12/10/2024 Lacosamide (Tablet) 60.0 30 200 MG NA ARCADIO BEDOLLA [facility] Commercial Insurance 0 / 0 [place]   1 [id number] 11/11/2024 11/11/2024 Lacosamide (Tablet) 60.0 30 200 MG NA MAHAD AYOUB [facility] Commercial Insurance 0 / 0 [place]   1 [id number] 10/10/2024 10/10/2024 Lacosamide (Tablet) 60.0 30 200 MG NA STUART BRADFORD [facility] Commercial Insurance 0 / 5 [place]  Active agreement on file -No

## 2025-01-20 ENCOUNTER — HOSPITAL ENCOUNTER (OUTPATIENT)
Dept: CT IMAGING | Facility: HOSPITAL | Age: 70
Discharge: HOME | End: 2025-01-20
Attending: INTERNAL MEDICINE
Payer: COMMERCIAL

## 2025-01-20 DIAGNOSIS — C79.9 METASTATIC ADENOCARCINOMA (HCC): ICD-10-CM

## 2025-01-20 PROCEDURE — 71260 CT THORAX DX C+: CPT

## 2025-01-20 PROCEDURE — 74177 CT ABD & PELVIS W/CONTRAST: CPT

## 2025-01-20 RX ADMIN — IOHEXOL 100 ML: 350 INJECTION, SOLUTION INTRAVENOUS at 11:17

## 2025-01-24 DIAGNOSIS — G40.109 FOCAL EPILEPSY ORIGINATING IN FRONTAL LOBE (HCC): ICD-10-CM

## 2025-01-24 RX ORDER — LEVETIRACETAM 1000 MG/1
1500 TABLET ORAL 2 TIMES DAILY
Qty: 90 TABLET | Refills: 0 | Status: SHIPPED | OUTPATIENT
Start: 2025-01-24 | End: 2026-01-19

## 2025-01-27 ENCOUNTER — TELEPHONE (OUTPATIENT)
Dept: HEMATOLOGY ONCOLOGY | Facility: CLINIC | Age: 70
End: 2025-01-27

## 2025-02-03 DIAGNOSIS — I10 HYPERTENSION: ICD-10-CM

## 2025-02-03 DIAGNOSIS — Z76.0 MEDICATION REFILL: ICD-10-CM

## 2025-02-03 DIAGNOSIS — I10 ESSENTIAL HYPERTENSION: ICD-10-CM

## 2025-02-03 DIAGNOSIS — E78.00 HYPERCHOLESTEROLEMIA: ICD-10-CM

## 2025-02-03 DIAGNOSIS — G40.109 FOCAL EPILEPSY ORIGINATING IN FRONTAL LOBE (HCC): ICD-10-CM

## 2025-02-04 RX ORDER — AMLODIPINE BESYLATE 10 MG/1
10 TABLET ORAL DAILY
Qty: 30 TABLET | Refills: 5 | Status: SHIPPED | OUTPATIENT
Start: 2025-02-04

## 2025-02-04 RX ORDER — LACOSAMIDE 200 MG/1
200 TABLET ORAL EVERY 12 HOURS SCHEDULED
Qty: 60 TABLET | Refills: 5 | Status: SHIPPED | OUTPATIENT
Start: 2025-02-04 | End: 2025-08-03

## 2025-02-04 RX ORDER — LOSARTAN POTASSIUM 50 MG/1
50 TABLET ORAL DAILY
Qty: 30 TABLET | Refills: 5 | Status: SHIPPED | OUTPATIENT
Start: 2025-02-04

## 2025-02-04 RX ORDER — ATORVASTATIN CALCIUM 40 MG/1
40 TABLET, FILM COATED ORAL
Qty: 30 TABLET | Refills: 5 | Status: SHIPPED | OUTPATIENT
Start: 2025-02-04

## 2025-02-04 NOTE — TELEPHONE ENCOUNTER
Medication: Vimpat   PDMP  01/09/2025 01/09/2025 Lacosamide (Tablet) 60.0 30 200 MG NA STUART BRADFORD [facility] Commercial Insurance 0 / 0 [place]   1 [id number] 12/10/2024 12/10/2024 Lacosamide (Tablet) 60.0 30 200 MG NA ARCADIO BEDOLLA [facility] Commercial Insurance 0 / 0 [place]   1 [id number] 11/11/2024 11/11/2024 Lacosamide (Tablet) 60.0 30 200 MG NA MAHAD AYOUB [facility] Commercial Insurance 0 / 0 [place]   1 [id number] 10/10/2024 10/10/2024 Lacosamide (Tablet) 60.0 30 200 MG NA STUART BRADFORD [facility] Commercial Insurance 0 / 5 [place]  Active agreement on file -No

## 2025-02-06 ENCOUNTER — TELEPHONE (OUTPATIENT)
Dept: HEMATOLOGY ONCOLOGY | Facility: CLINIC | Age: 70
End: 2025-02-06

## 2025-02-06 ENCOUNTER — TELEMEDICINE (OUTPATIENT)
Dept: HEMATOLOGY ONCOLOGY | Facility: CLINIC | Age: 70
End: 2025-02-06
Payer: COMMERCIAL

## 2025-02-06 DIAGNOSIS — C34.91 CARCINOMA OF RIGHT LUNG (HCC): Primary | ICD-10-CM

## 2025-02-06 DIAGNOSIS — C79.31 METASTASIS TO BRAIN (HCC): ICD-10-CM

## 2025-02-06 PROCEDURE — 99214 OFFICE O/P EST MOD 30 MIN: CPT | Performed by: INTERNAL MEDICINE

## 2025-02-06 RX ORDER — DEXAMETHASONE 4 MG/1
8 TABLET ORAL 2 TIMES DAILY WITH MEALS
Qty: 12 TABLET | Refills: 5 | Status: SHIPPED | OUTPATIENT
Start: 2025-02-06

## 2025-02-06 NOTE — TELEPHONE ENCOUNTER
Spoke with patient - labs scheduled 2/7 & treatment scheduled 2/11.   Finance,  Sending to be sure auth will be good to go.  Thank you!

## 2025-02-06 NOTE — TELEPHONE ENCOUNTER
Phoned patient to review plan to resume Docetaxel.  Patient aware and agreeable.  Reviewed Dexamethasone 8mg BID day before, day of and day after chemotherapy.  Patient verbalizes understanding.  Dexamethasone prescription routed to Dr. Langford.

## 2025-02-06 NOTE — TELEPHONE ENCOUNTER
Advised patient his appointment with Dr. Langford will be changed to a virtual appointment today due to the weather.  Patient verbalized understanding and is requesting a telephone call at time of visit.

## 2025-02-06 NOTE — PROGRESS NOTES
Virtual Brief Visit  Name: Paras Pitts      : 1955      MRN: [id number]  Encounter Provider: Collin Langford MD  Encounter Date: 2025   Encounter department: [facility] [place]    This Visit is being completed by telephone. The Patient is located at Home and in the following state in which I hold an active license [place]    The patient was identified by name and date of birth. Paras Pitts was informed that this is a telemedicine visit and that the visit is being conducted through Telephone.  My office door was closed. No one else was in the room.  He acknowledged consent and understanding of privacy and security of the video platform. The patient has agreed to participate and understands they can discontinue the visit at any time.    Patient is aware this is a billable service.     :  Assessment & Plan  Carcinoma of right lung (HCC)  Non-small cell lung cancer favoring adenocarcinoma, clinical stage JAY at diagnosis (cT3, cN1, cM1b) S/P surgical resection of the oligometastatic brain lesion followed by SRS.  Status post 4 cycles of neoadjuvant chemotherapy carboplatin, Alimta and nivolumab 2023.    Status post right upper lobe lobectomy on 2023. The final pathology was ypT2b, pN2, G3.  R0.     Started on adjuvant immunotherapy with nivolumab 480 mg on every 4-week basis on 10/13/2023.  PET scan in 2024 showed mediastinal adenopathy compatible with recurrence. This was confirmed with bronchoscopy and EBUS guided biopsy on 2024 which showed metastatic disease in the 4R, 2R lymph node area.      His case was discussed with the radiation oncology team. The decision was made to pursue 4 doses of Avastin to decrease the vasogenic edema in the brain since he was felt to have residual/recurrent disease in the brain according to the MRI from 2024.   Instead of concurrent chemoradiation the decision was made to get him started on single agent Taxotere.  The  patient received a total of 4 cycles of Taxotere followed by a PET scan on 8/5/2024 which showed persistent hypermetabolic right paratracheal mediastinal tri metastatic disease which seems to be stable with mild improvement in size but increased in activity.  The mild splenomegaly and increased diffuse bone marrow activity is most likely reactive.     The patient received 5 cycles of adjusted dose Taxotere 60 mg/m².  Last treatment was given on 8/8/2024.  The patient then received radiation to the hypermetabolic area of the right upper lobe/mediastinum which was completed on 10/21/2024.  He then requested a break from treatment.    I did discuss with him the result of the recent CT scan of the chest abdomen pelvis which was done on 1/20/2025 extensively.  He was found to have multiple new lung nodules concerning for metastatic progression with increased mediastinal lymph node.  He also seems to have subacute left inferior pubic ramus fracture which he is aware of.  He stated that he had a fall around the summer of last year.    We did discuss putting him back on the palliative chemotherapy with Taxotere as single agent at the usual dose.  The dose can be adjusted down to 60 mg/m² on every 3-week basis if he starts to have difficulties tolerating the treatment.  He continues to be treatment focused.  I did advise him to also follow-up with the radiation oncology team who is planning to pursue an MRI of the brain in the near future.             Metastasis to brain (HCC)  He is status post surgical resection of the oligometastatic Brain lesion followed by postoperative RT with SRS/SRT in 5 fractions to avoid local recurrence.    Brain MRI from April 2024 showed residual or recurrence of his disease.  He did receive 4 doses of Avastin to decrease the enhancement or vasogenic edema.  MRI of the brain on 9/4/2024 showed improved edema in the parasagittal right frontal lobe resected mass area.  No hint of new lesions was  reported.                History of Present Illness   HPI  The patient stated that he is doing relatively well.  He does seem to have pain in the pelvic area from previous fall.  Recent imaging showed a subacute pubic ramus fracture.  Visit Time  Total Visit Duration: 20min

## 2025-02-07 ENCOUNTER — HOSPITAL ENCOUNTER (OUTPATIENT)
Dept: INFUSION CENTER | Facility: HOSPITAL | Age: 70
End: 2025-02-07
Attending: INTERNAL MEDICINE
Payer: COMMERCIAL

## 2025-02-07 VITALS — TEMPERATURE: 97.5 F

## 2025-02-07 DIAGNOSIS — C34.91 CARCINOMA OF RIGHT LUNG (HCC): ICD-10-CM

## 2025-02-07 DIAGNOSIS — C79.9 METASTATIC ADENOCARCINOMA (HCC): Primary | ICD-10-CM

## 2025-02-07 DIAGNOSIS — Z45.2 ENCOUNTER FOR CENTRAL LINE CARE: ICD-10-CM

## 2025-02-07 LAB
ALBUMIN SERPL BCG-MCNC: 4.6 G/DL (ref 3.5–5)
ALP SERPL-CCNC: 85 U/L (ref 34–104)
ALT SERPL W P-5'-P-CCNC: 14 U/L (ref 7–52)
ANION GAP SERPL CALCULATED.3IONS-SCNC: 6 MMOL/L (ref 4–13)
AST SERPL W P-5'-P-CCNC: 18 U/L (ref 13–39)
BASOPHILS # BLD AUTO: 0.06 THOUSANDS/ΜL (ref 0–0.1)
BASOPHILS NFR BLD AUTO: 1 % (ref 0–1)
BILIRUB SERPL-MCNC: 0.58 MG/DL (ref 0.2–1)
BUN SERPL-MCNC: 14 MG/DL (ref 5–25)
CALCIUM SERPL-MCNC: 9.2 MG/DL (ref 8.4–10.2)
CHLORIDE SERPL-SCNC: 107 MMOL/L (ref 96–108)
CO2 SERPL-SCNC: 29 MMOL/L (ref 21–32)
CREAT SERPL-MCNC: 0.69 MG/DL (ref 0.6–1.3)
EOSINOPHIL # BLD AUTO: 0.31 THOUSAND/ΜL (ref 0–0.61)
EOSINOPHIL NFR BLD AUTO: 5 % (ref 0–6)
ERYTHROCYTE [DISTWIDTH] IN BLOOD BY AUTOMATED COUNT: 13.1 % (ref 11.6–15.1)
GFR SERPL CREATININE-BSD FRML MDRD: 95 ML/MIN/1.73SQ M
GLUCOSE SERPL-MCNC: 147 MG/DL (ref 65–140)
HCT VFR BLD AUTO: 41.7 % (ref 36.5–49.3)
HGB BLD-MCNC: 13.7 G/DL (ref 12–17)
IMM GRANULOCYTES # BLD AUTO: 0.04 THOUSAND/UL (ref 0–0.2)
IMM GRANULOCYTES NFR BLD AUTO: 1 % (ref 0–2)
LYMPHOCYTES # BLD AUTO: 1.33 THOUSANDS/ΜL (ref 0.6–4.47)
LYMPHOCYTES NFR BLD AUTO: 22 % (ref 14–44)
MAGNESIUM SERPL-MCNC: 1.9 MG/DL (ref 1.9–2.7)
MCH RBC QN AUTO: 29.9 PG (ref 26.8–34.3)
MCHC RBC AUTO-ENTMCNC: 32.9 G/DL (ref 31.4–37.4)
MCV RBC AUTO: 91 FL (ref 82–98)
MONOCYTES # BLD AUTO: 0.34 THOUSAND/ΜL (ref 0.17–1.22)
MONOCYTES NFR BLD AUTO: 6 % (ref 4–12)
NEUTROPHILS # BLD AUTO: 3.92 THOUSANDS/ΜL (ref 1.85–7.62)
NEUTS SEG NFR BLD AUTO: 65 % (ref 43–75)
NRBC BLD AUTO-RTO: 0 /100 WBCS
PLATELET # BLD AUTO: 192 THOUSANDS/UL (ref 149–390)
PMV BLD AUTO: 9.9 FL (ref 8.9–12.7)
POTASSIUM SERPL-SCNC: 3.7 MMOL/L (ref 3.5–5.3)
PROT SERPL-MCNC: 7.3 G/DL (ref 6.4–8.4)
RBC # BLD AUTO: 4.58 MILLION/UL (ref 3.88–5.62)
SODIUM SERPL-SCNC: 142 MMOL/L (ref 135–147)
WBC # BLD AUTO: 6 THOUSAND/UL (ref 4.31–10.16)

## 2025-02-07 PROCEDURE — 80053 COMPREHEN METABOLIC PANEL: CPT

## 2025-02-07 PROCEDURE — 83735 ASSAY OF MAGNESIUM: CPT

## 2025-02-07 PROCEDURE — 85025 COMPLETE CBC W/AUTO DIFF WBC: CPT

## 2025-02-07 NOTE — PROGRESS NOTES
Paras Pitts  tolerated treatment well with no complications.  Labs obtained via port without difficulty.    Paras Pitts is aware of future appt on 2/11 at 10:30 am.     AVS declined by Paras Pitts.

## 2025-02-11 ENCOUNTER — NURSE TRIAGE (OUTPATIENT)
Age: 70
End: 2025-02-11

## 2025-02-11 ENCOUNTER — HOSPITAL ENCOUNTER (OUTPATIENT)
Dept: INFUSION CENTER | Facility: HOSPITAL | Age: 70
Discharge: HOME | End: 2025-02-11
Attending: INTERNAL MEDICINE

## 2025-02-11 VITALS
BODY MASS INDEX: 29.58 KG/M2 | SYSTOLIC BLOOD PRESSURE: 173 MMHG | HEART RATE: 87 BPM | HEIGHT: 69 IN | RESPIRATION RATE: 18 BRPM | DIASTOLIC BLOOD PRESSURE: 82 MMHG | WEIGHT: 199.74 LBS | TEMPERATURE: 96.8 F | OXYGEN SATURATION: 97 %

## 2025-02-11 DIAGNOSIS — D70.1 CHEMOTHERAPY-INDUCED NEUTROPENIA (HCC): ICD-10-CM

## 2025-02-11 DIAGNOSIS — T45.1X5A CHEMOTHERAPY-INDUCED NEUTROPENIA (HCC): ICD-10-CM

## 2025-02-11 DIAGNOSIS — C34.91 CARCINOMA OF RIGHT LUNG (HCC): ICD-10-CM

## 2025-02-11 DIAGNOSIS — C79.9 METASTATIC ADENOCARCINOMA (HCC): ICD-10-CM

## 2025-02-11 DIAGNOSIS — G40.909 SEIZURE DISORDER (HCC): Primary | ICD-10-CM

## 2025-02-11 NOTE — TELEPHONE ENCOUNTER
I would like him to get blood work to check is levetiracetam and lacosamide levels.  One option is to go back up on Levetiracetam to 2000mg twice a day.  Or wait to see what are his medication levels to see if we can increase lacosamide further.

## 2025-02-11 NOTE — PROGRESS NOTES
"Pt stated that he had a seizure last night. \"It didn't last long because I took my emergency medication\" When questioned he said he took 2000 mg of keppra rather than the 1500 mg he would have normally taken. He did not inform any provider of this. [place] message to Elaine Kay RN. Per dr Langford pt must reach out to neurology and then call Dr Salinas office to get put back on the schedule. His treatment today will be held. Kaley in pharmacy made aware.   "

## 2025-02-11 NOTE — TELEPHONE ENCOUNTER
"pt called and states that he had a slight seizure last night.  He was sitting watching TV, thinking about his chemo and his   Left leg started shaking real bad and couldn't stop it.  Lasted c couple minutes.  He Thought he was taking his emergency med Clonazpeam 1mg But he grabbed the wrong bottle and he took mirapex 0.25 1 tab instead-mirapex is prescribed by PCP    He states that he had just taken his LEVE 1000mg 1.5 tabs shortly before this occurred so he took 1/2 tab LEVE and it then stopped    He said that he is Not sure if the seizure was related to his nerves as he was scheduled to have chemo today and he was thinking about the chemo when this started.  this was going to be his first chemo in several months.   he went to chemo today and they advised that he call our office.  He also noted that he was prescribed Dexamethasone-4mg 2 tabs bid,  Day before chemo and day of chemo    Current meds-  Lacosamide 200mg 1 tab bid  LEVE 1000mg 1.5 tabs BID    He states that he took LEVE 1000mg 2 tabs this am instead of 1.5 tabs     Please advise  [phone] to leave detailed message    Reason for Disposition   Seizure lasting < 5 minutes with a history of prior seizure(s), and taking anticonvulsants    Answer Assessment - Initial Assessment Questions  1. ONSET: \"When did the seizure occur?\"      Last night  2. DURATION: \"How long did the seizure last (or how long has it been happening)?\" (e.g., seconds, minutes)  Note: Most seizures last less than 5 minutes.      Lasted a couple min  3. DESCRIPTION: \"Describe what happened during the seizure.\" \"Did the body become stiff?\" \"Was there any jerking?\"  \"Did they lose consciousness during the seizure?\"      Left leg shaking  4. CIRCUMSTANCE: \"What was the person doing when the seizure began?\"       Sitting watching TV thinking about the chemo        6. PRIOR SEIZURES: \"Has the person had a seizure (convulsion) before?\" (e.g., epilepsy, other cause)  If Yes, ask: \"When was " "the last time?\" and \"What happened last time?\"       Yes, similar but not as bad as previous seizures  7. EPILEPSY: \"Does the person have epilepsy?\" Note: Check for medical ID rosalind.      yes  8. MEDICINES: \"Does the person take anticonvulsant medications?\" (e.g., Yes, No; missed doses, any recent changes)      LEVE 1000mg 1.5 tabs bid   Lacosamide 200mg 1 tab bid  9. INJURY: \"Was the person hurt or injured during the seizure?\" (e.g., hit their head, bit their tongue)      no  10. OTHER SYMPTOMS: \"Are there any other symptoms?\" (e.g., fever, headache)        no    Protocols used: Seizure-Adult-[place]    "

## 2025-02-12 NOTE — TELEPHONE ENCOUNTER
Inbound call received from patient's spouse, Bossman.     Bossman requesting to speak with Dr. Martínez to discuss current concern of pt not being able to have chemotherapy due to having RLS/seizure before Chemo treatment.    RN reviewed message from Dr. Junior and informed Jaclyn that the medication lab scripts were in pt's chart.  Jaclyn stated that she was not aware that the message was there and that no one reached out to her to discuss it and did not know who Dr. Junior was. Explained to Jaclyn that Dr. Junior is one of the neurology epilepsy attendings. Informed Jaclyn that the message was sent from the provider to the clinical team and that the clinical team would be reaching out to discuss further. Jaclyn stated that she never heard from the office and the call ended.     Dr. Martínez: are you able to contact pt's spouse to discuss further.

## 2025-02-13 ENCOUNTER — TELEPHONE (OUTPATIENT)
Dept: HEMATOLOGY ONCOLOGY | Facility: CLINIC | Age: 70
End: 2025-02-13

## 2025-02-13 ENCOUNTER — APPOINTMENT (OUTPATIENT)
Age: 70
End: 2025-02-13
Payer: COMMERCIAL

## 2025-02-13 ENCOUNTER — HOSPITAL ENCOUNTER (OUTPATIENT)
Dept: INFUSION CENTER | Facility: HOSPITAL | Age: 70
End: 2025-02-13
Attending: INTERNAL MEDICINE

## 2025-02-13 DIAGNOSIS — G40.109 FOCAL EPILEPSY ORIGINATING IN FRONTAL LOBE (HCC): ICD-10-CM

## 2025-02-13 DIAGNOSIS — G40.909 SEIZURE DISORDER (HCC): ICD-10-CM

## 2025-02-13 LAB
LACOSAMIDE SERPL-MCNC: 10.6 UG/ML (ref 1–20)
LEVETIRACETAM SERPL-MCNC: 20.1 UG/ML (ref 12–46)

## 2025-02-13 PROCEDURE — 36415 COLL VENOUS BLD VENIPUNCTURE: CPT

## 2025-02-13 PROCEDURE — 80235 DRUG ASSAY LACOSAMIDE: CPT

## 2025-02-13 PROCEDURE — 83520 IMMUNOASSAY QUANT NOS NONAB: CPT

## 2025-02-13 RX ORDER — LEVETIRACETAM 1000 MG/1
2000 TABLET ORAL 2 TIMES DAILY
Qty: 120 TABLET | Refills: 11 | Status: SHIPPED | OUTPATIENT
Start: 2025-02-13 | End: 2026-02-08

## 2025-02-13 NOTE — TELEPHONE ENCOUNTER
OK per Dr. Langford and Dr. Martínez for patient to resume chemotherapy.  Please reach out and reschedule chemotherapy.   Patient did not receive on 2/11.

## 2025-02-13 NOTE — PROGRESS NOTES
Called patient to discuss his recent seizure (left leg shaking for a few minute) which is likely from the reduced dose of medication. I will increase the Keppra back to 2000mg BID and he should continue lacosamide 200mg BID for now. I will reach out to his oncologist Dr. Langford to discuss that form my standpoint he is OK to have chemotherapy on the higher dose.

## 2025-02-13 NOTE — TELEPHONE ENCOUNTER
MD Sadaf Hernandez, RN; Neurology Epilepsy Team 136 minutes ago (10:07 AM)       I spoke to patient directly just now, thank you

## 2025-02-14 ENCOUNTER — HOSPITAL ENCOUNTER (OUTPATIENT)
Dept: INFUSION CENTER | Facility: HOSPITAL | Age: 70
Discharge: HOME | End: 2025-02-14

## 2025-02-14 ENCOUNTER — HOSPITAL ENCOUNTER (OUTPATIENT)
Dept: INFUSION CENTER | Facility: HOSPITAL | Age: 70
End: 2025-02-14
Attending: INTERNAL MEDICINE
Payer: COMMERCIAL

## 2025-02-14 VITALS
DIASTOLIC BLOOD PRESSURE: 87 MMHG | WEIGHT: 199.74 LBS | OXYGEN SATURATION: 97 % | BODY MASS INDEX: 29.58 KG/M2 | HEART RATE: 82 BPM | HEIGHT: 69 IN | SYSTOLIC BLOOD PRESSURE: 153 MMHG | RESPIRATION RATE: 18 BRPM | TEMPERATURE: 98.1 F

## 2025-02-14 DIAGNOSIS — C79.9 METASTATIC ADENOCARCINOMA (HCC): ICD-10-CM

## 2025-02-14 DIAGNOSIS — D70.1 CHEMOTHERAPY-INDUCED NEUTROPENIA (HCC): ICD-10-CM

## 2025-02-14 DIAGNOSIS — C34.91 CARCINOMA OF RIGHT LUNG (HCC): Primary | ICD-10-CM

## 2025-02-14 DIAGNOSIS — T45.1X5A CHEMOTHERAPY-INDUCED NEUTROPENIA (HCC): ICD-10-CM

## 2025-02-14 PROCEDURE — 96367 TX/PROPH/DG ADDL SEQ IV INF: CPT

## 2025-02-14 PROCEDURE — 96413 CHEMO IV INFUSION 1 HR: CPT

## 2025-02-14 PROCEDURE — 96377 APPLICATON ON-BODY INJECTOR: CPT

## 2025-02-14 RX ORDER — SODIUM CHLORIDE 9 MG/ML
20 INJECTION, SOLUTION INTRAVENOUS ONCE
Status: COMPLETED | OUTPATIENT
Start: 2025-02-14 | End: 2025-02-14

## 2025-02-14 RX ADMIN — SODIUM CHLORIDE 20 ML/HR: 0.9 INJECTION, SOLUTION INTRAVENOUS at 09:27

## 2025-02-14 RX ADMIN — DEXAMETHASONE SODIUM PHOSPHATE: 100 INJECTION INTRAMUSCULAR; INTRAVENOUS at 09:28

## 2025-02-14 RX ADMIN — PEGFILGRASTIM 6 MG: KIT SUBCUTANEOUS at 11:03

## 2025-02-14 RX ADMIN — DOCETAXEL 124.2 MG: 20 INJECTION, SOLUTION, CONCENTRATE INTRAVENOUS at 10:03

## 2025-02-14 NOTE — PROGRESS NOTES
Paras Pitts  tolerated treatment well with no complications.  Neulasta OnPro to TAM, blinking green.    Paras Pitts is aware of future appt on 3/5 at 12 pm.     AVS printed and given to Paras Pitts.

## 2025-02-25 NOTE — PLAN OF CARE
Problem: SAFETY ADULT  Goal: Patient will remain free of falls  Description: INTERVENTIONS:  - Educate patient/family on patient safety including physical limitations  - Instruct patient to call for assistance with activity   - Consult OT/PT to assist with strengthening/mobility   - Keep Call bell within reach  - Keep bed low and locked with side rails adjusted as appropriate  - Keep care items and personal belongings within reach  - Initiate and maintain comfort rounds  - Make Fall Risk Sign visible to staff  - Apply yellow socks and bracelet for high fall risk patients  - Consider moving patient to room near nurses station  Outcome: Not Progressing  Patient had a fall on 5/11 out of wheelchair      no

## 2025-02-28 RX ORDER — SODIUM CHLORIDE 9 MG/ML
20 INJECTION, SOLUTION INTRAVENOUS ONCE
Status: CANCELLED | OUTPATIENT
Start: 2025-03-07

## 2025-03-03 ENCOUNTER — HOSPITAL ENCOUNTER (OUTPATIENT)
Dept: MRI IMAGING | Facility: HOSPITAL | Age: 70
Discharge: HOME | End: 2025-03-03
Payer: COMMERCIAL

## 2025-03-03 DIAGNOSIS — C79.31 METASTASIS TO BRAIN (HCC): ICD-10-CM

## 2025-03-03 PROCEDURE — A9585 GADOBUTROL INJECTION: HCPCS | Performed by: STUDENT IN AN ORGANIZED HEALTH CARE EDUCATION/TRAINING PROGRAM

## 2025-03-03 PROCEDURE — 70553 MRI BRAIN STEM W/O & W/DYE: CPT

## 2025-03-03 RX ORDER — GADOBUTROL 604.72 MG/ML
10 INJECTION INTRAVENOUS
Status: COMPLETED | OUTPATIENT
Start: 2025-03-03 | End: 2025-03-03

## 2025-03-03 RX ADMIN — GADOBUTROL 10 ML: 604.72 INJECTION INTRAVENOUS at 16:06

## 2025-03-05 ENCOUNTER — HOSPITAL ENCOUNTER (OUTPATIENT)
Dept: INFUSION CENTER | Facility: HOSPITAL | Age: 70
Discharge: HOME | End: 2025-03-05
Payer: COMMERCIAL

## 2025-03-05 DIAGNOSIS — C34.91 CARCINOMA OF RIGHT LUNG (HCC): ICD-10-CM

## 2025-03-05 DIAGNOSIS — Z45.2 ENCOUNTER FOR CENTRAL LINE CARE: ICD-10-CM

## 2025-03-05 DIAGNOSIS — C79.9 METASTATIC ADENOCARCINOMA (HCC): Primary | ICD-10-CM

## 2025-03-05 LAB
ALBUMIN SERPL BCG-MCNC: 4.4 G/DL (ref 3.5–5)
ALP SERPL-CCNC: 80 U/L (ref 34–104)
ALT SERPL W P-5'-P-CCNC: 19 U/L (ref 7–52)
ANION GAP SERPL CALCULATED.3IONS-SCNC: 8 MMOL/L (ref 4–13)
AST SERPL W P-5'-P-CCNC: 24 U/L (ref 13–39)
BASOPHILS # BLD AUTO: 0.12 THOUSANDS/ÂΜL (ref 0–0.1)
BASOPHILS NFR BLD AUTO: 2 % (ref 0–1)
BILIRUB SERPL-MCNC: 0.7 MG/DL (ref 0.2–1)
BUN SERPL-MCNC: 11 MG/DL (ref 5–25)
CALCIUM SERPL-MCNC: 9 MG/DL (ref 8.4–10.2)
CHLORIDE SERPL-SCNC: 106 MMOL/L (ref 96–108)
CO2 SERPL-SCNC: 27 MMOL/L (ref 21–32)
CREAT SERPL-MCNC: 0.78 MG/DL (ref 0.6–1.3)
EOSINOPHIL # BLD AUTO: 0.01 THOUSAND/ÂΜL (ref 0–0.61)
EOSINOPHIL NFR BLD AUTO: 0 % (ref 0–6)
ERYTHROCYTE [DISTWIDTH] IN BLOOD BY AUTOMATED COUNT: 13.9 % (ref 11.6–15.1)
GFR SERPL CREATININE-BSD FRML MDRD: 90 ML/MIN/1.73SQ M
GLUCOSE SERPL-MCNC: 152 MG/DL (ref 65–140)
HCT VFR BLD AUTO: 42.8 % (ref 36.5–49.3)
HGB BLD-MCNC: 13.7 G/DL (ref 12–17)
IMM GRANULOCYTES # BLD AUTO: 0.16 THOUSAND/UL (ref 0–0.2)
IMM GRANULOCYTES NFR BLD AUTO: 2 % (ref 0–2)
LYMPHOCYTES # BLD AUTO: 1.4 THOUSANDS/ÂΜL (ref 0.6–4.47)
LYMPHOCYTES NFR BLD AUTO: 17 % (ref 14–44)
MAGNESIUM SERPL-MCNC: 1.8 MG/DL (ref 1.9–2.7)
MCH RBC QN AUTO: 29.7 PG (ref 26.8–34.3)
MCHC RBC AUTO-ENTMCNC: 32 G/DL (ref 31.4–37.4)
MCV RBC AUTO: 93 FL (ref 82–98)
MONOCYTES # BLD AUTO: 0.58 THOUSAND/ÂΜL (ref 0.17–1.22)
MONOCYTES NFR BLD AUTO: 7 % (ref 4–12)
NEUTROPHILS # BLD AUTO: 5.76 THOUSANDS/ÂΜL (ref 1.85–7.62)
NEUTS SEG NFR BLD AUTO: 72 % (ref 43–75)
NRBC BLD AUTO-RTO: 0 /100 WBCS
PLATELET # BLD AUTO: 310 THOUSANDS/UL (ref 149–390)
PMV BLD AUTO: 9.5 FL (ref 8.9–12.7)
POTASSIUM SERPL-SCNC: 3.8 MMOL/L (ref 3.5–5.3)
PROT SERPL-MCNC: 7.4 G/DL (ref 6.4–8.4)
RBC # BLD AUTO: 4.62 MILLION/UL (ref 3.88–5.62)
SODIUM SERPL-SCNC: 141 MMOL/L (ref 135–147)
TSH SERPL DL<=0.05 MIU/L-ACNC: 3.05 UIU/ML (ref 0.45–4.5)
WBC # BLD AUTO: 8.03 THOUSAND/UL (ref 4.31–10.16)

## 2025-03-05 PROCEDURE — 80053 COMPREHEN METABOLIC PANEL: CPT

## 2025-03-05 PROCEDURE — 85025 COMPLETE CBC W/AUTO DIFF WBC: CPT

## 2025-03-05 PROCEDURE — 83735 ASSAY OF MAGNESIUM: CPT

## 2025-03-05 PROCEDURE — 84443 ASSAY THYROID STIM HORMONE: CPT

## 2025-03-05 NOTE — PROGRESS NOTES
Patient arrived for lab draw from port. Pt offered no complaints today.  Port accessed, brisk blood return noted. Labs drawn and sent. Port flushed and de accessed. AVS declined and aware of next appt on Friday.

## 2025-03-06 ENCOUNTER — OFFICE VISIT (OUTPATIENT)
Dept: HEMATOLOGY ONCOLOGY | Facility: CLINIC | Age: 70
End: 2025-03-06
Payer: COMMERCIAL

## 2025-03-06 VITALS
TEMPERATURE: 98 F | DIASTOLIC BLOOD PRESSURE: 80 MMHG | RESPIRATION RATE: 20 BRPM | BODY MASS INDEX: 28.73 KG/M2 | HEIGHT: 69 IN | SYSTOLIC BLOOD PRESSURE: 130 MMHG | WEIGHT: 194 LBS | HEART RATE: 88 BPM | OXYGEN SATURATION: 94 %

## 2025-03-06 DIAGNOSIS — C79.31 METASTASIS TO BRAIN (HCC): ICD-10-CM

## 2025-03-06 DIAGNOSIS — D61.818 OTHER PANCYTOPENIA (HCC): ICD-10-CM

## 2025-03-06 DIAGNOSIS — C34.91 CARCINOMA OF RIGHT LUNG (HCC): Primary | ICD-10-CM

## 2025-03-06 PROCEDURE — 99214 OFFICE O/P EST MOD 30 MIN: CPT | Performed by: INTERNAL MEDICINE

## 2025-03-06 RX ORDER — SODIUM CHLORIDE 9 MG/ML
20 INJECTION, SOLUTION INTRAVENOUS ONCE
OUTPATIENT
Start: 2025-04-18

## 2025-03-06 RX ORDER — SODIUM CHLORIDE 9 MG/ML
20 INJECTION, SOLUTION INTRAVENOUS ONCE
OUTPATIENT
Start: 2025-03-28

## 2025-03-06 RX ORDER — MAGNESIUM SULFATE HEPTAHYDRATE 40 MG/ML
2 INJECTION, SOLUTION INTRAVENOUS ONCE
Status: CANCELLED
Start: 2025-03-07

## 2025-03-06 NOTE — ASSESSMENT & PLAN NOTE
He is status post surgical resection of the oligometastatic Brain lesion followed by postoperative RT with SRS/SRT in 5 fractions to avoid local recurrence.    Brain MRI from April 2024 showed residual or recurrence of his disease.  He did receive 4 doses of Avastin to decrease the enhancement or vasogenic edema.    I did review with him the result of the recent brain MRI from 3/3/2025 which is showing some increased FLAIR abnormality in the postsurgical bed in the right frontal lobe.  I did ask him to review the result of the brain MRI with the radiation oncology team who is going to see him very soon.

## 2025-03-06 NOTE — ASSESSMENT & PLAN NOTE
Non-small cell lung cancer favoring adenocarcinoma, clinical stage JAY at diagnosis (cT3, cN1, cM1b) S/P surgical resection of the oligometastatic brain lesion followed by SRS.  Status post 4 cycles of neoadjuvant chemotherapy carboplatin, Alimta and nivolumab 7/2023.    Status post right upper lobe lobectomy on 9/1/2023. The final pathology was ypT2b, pN2, G3.  R0.     Started on adjuvant immunotherapy with nivolumab 480 mg on every 4-week basis on 10/13/2023.  PET scan in March 2024 showed mediastinal adenopathy compatible with recurrence. This was confirmed with bronchoscopy and EBUS guided biopsy on 4/16/2024 which showed metastatic disease in the 4R, 2R lymph node area.      His case was discussed with the radiation oncology team. The decision was made to pursue 4 doses of Avastin to decrease the vasogenic edema in the brain since he was felt to have residual/recurrent disease in the brain according to the MRI from April 2024.   Instead of concurrent chemoradiation the decision was made to get him started on single agent Taxotere.  The patient received a total of 4 cycles of Taxotere followed by a PET scan on 8/5/2024 which showed persistent hypermetabolic right paratracheal mediastinal tri metastatic disease which seems to be stable with mild improvement in size but increased in activity.  The mild splenomegaly and increased diffuse bone marrow activity is most likely reactive.     The patient received 5 cycles of adjusted dose Taxotere 60 mg/m².  Last treatment was given on 8/8/2024.  The patient then received radiation to the hypermetabolic area of the right upper lobe/mediastinum which was completed on 10/21/2024.  He then requested a break from treatment which lasted from August 2024 until the middle of February 2025.    The patient was restarted on adjusted dose docetaxel on 2/14/2025.  He seems to be tolerating the current chemotherapy with expected side effects.    We did discuss continue the current  palliative chemotherapy and obtaining a CT scan of the chest abdomen pelvis around the end of April to evaluate the status of his disease on the current line of palliative chemotherapy.      Orders:    CBC and differential; Future    Comprehensive metabolic panel; Future    Magnesium; Future    TSH, 3rd generation with Free T4 reflex; Future    CT chest abdomen pelvis w contrast; Future

## 2025-03-06 NOTE — PROGRESS NOTES
Name: Paras Pitts      : 1955      MRN: [id number]  Encounter Provider: Collin Langford MD  Encounter Date: 3/6/2025   Encounter department: [facility] [place]  :  Assessment & Plan  Carcinoma of right lung (HCC)  Non-small cell lung cancer favoring adenocarcinoma, clinical stage JAY at diagnosis (cT3, cN1, cM1b) S/P surgical resection of the oligometastatic brain lesion followed by SRS.  Status post 4 cycles of neoadjuvant chemotherapy carboplatin, Alimta and nivolumab 2023.    Status post right upper lobe lobectomy on 2023. The final pathology was ypT2b, pN2, G3.  R0.     Started on adjuvant immunotherapy with nivolumab 480 mg on every 4-week basis on 10/13/2023.  PET scan in 2024 showed mediastinal adenopathy compatible with recurrence. This was confirmed with bronchoscopy and EBUS guided biopsy on 2024 which showed metastatic disease in the 4R, 2R lymph node area.      His case was discussed with the radiation oncology team. The decision was made to pursue 4 doses of Avastin to decrease the vasogenic edema in the brain since he was felt to have residual/recurrent disease in the brain according to the MRI from 2024.   Instead of concurrent chemoradiation the decision was made to get him started on single agent Taxotere.  The patient received a total of 4 cycles of Taxotere followed by a PET scan on 2024 which showed persistent hypermetabolic right paratracheal mediastinal tri metastatic disease which seems to be stable with mild improvement in size but increased in activity.  The mild splenomegaly and increased diffuse bone marrow activity is most likely reactive.     The patient received 5 cycles of adjusted dose Taxotere 60 mg/m².  Last treatment was given on 2024.  The patient then received radiation to the hypermetabolic area of the right upper lobe/mediastinum which was completed on 10/21/2024.  He then requested a break from treatment  which lasted from August 2024 until the middle of February 2025.    The patient was restarted on adjusted dose docetaxel on 2/14/2025.  He seems to be tolerating the current chemotherapy with expected side effects.    We did discuss continue the current palliative chemotherapy and obtaining a CT scan of the chest abdomen pelvis around the end of April to evaluate the status of his disease on the current line of palliative chemotherapy.      Orders:    CBC and differential; Future    Comprehensive metabolic panel; Future    Magnesium; Future    TSH, 3rd generation with Free T4 reflex; Future    CT chest abdomen pelvis w contrast; Future    Metastasis to brain (HCC)  He is status post surgical resection of the oligometastatic Brain lesion followed by postoperative RT with SRS/SRT in 5 fractions to avoid local recurrence.    Brain MRI from April 2024 showed residual or recurrence of his disease.  He did receive 4 doses of Avastin to decrease the enhancement or vasogenic edema.    I did review with him the result of the recent brain MRI from 3/3/2025 which is showing some increased FLAIR abnormality in the postsurgical bed in the right frontal lobe.  I did ask him to review the result of the brain MRI with the radiation oncology team who is going to see him very soon.         Other pancytopenia (HCC)  Neulasta will be used after each treatment to avoid neutropenic complications.           No follow-ups on file.    History of Present Illness   Chief Complaint   Patient presents with    Follow-up   The patient came today for a follow-up visit accompanied by his wife.  He stated that he is tolerating the current palliative chemotherapy with significant fatigue for couple of days after each cycle.  He did have an MRI of the brain on 3/3/2025 which showed:  IMPRESSION:     Increasing FLAIR abnormality in the postsurgical bed in the right frontal lobe now measuring 5.0 x 2.1 cm. Peripheral postcontrast enhancement in the  surgical cavity is noted which is similar in size to the prior study although, the enhancement has   become more intense on the current study which may be related to changes in medication therapy or potentially represents suboptimal gadolinium administration on the prior study. The intensity of enhancement is similar to a more remote MRI dated April 21, 2024. No evidence of abnormal cerebral blood flow or cerebral blood volume. Continued imaging follow-up recommended.     No new lesions are identified.    Blood work from 3/5/2025 was reviewed with the patient extensively.      Oncology History   Cancer Staging   Carcinoma of right lung (HCC)  Staging form: Lung, AJCC 8th Edition  - Clinical: Stage JAY (cT3, cN1, cM1b) - Signed by James Contreras MD on 4/13/2023  - Pathologic stage from 9/1/2023: Stage JAY (pT2b, pN2, cM1b) - Signed by Treva Bah PA-C on 9/20/2023  Histopathologic type: Adenocarcinoma, NOS  Stage prefix: Initial diagnosis  Histologic grade (G): G3  Histologic grading system: 4 grade system  Oncology History Overview Note   with Stage JAY ([id number]) lung adenocarcinoma s/p resection of a right frontal metastasis followed by SRT. He thereafter underwent right sided lobectomy and MLNDx with pathology demonstrating negative margins, 5/22 LNs involved with SARAHY. He was thereafter continued on immunotherapy until he developed mediastinal POD. On 10/21/24 he completed a course of RT to a dose of 6000cGy in 30 fractions. He was last seen 11/21/24 and returns today for follow up.    He is currently receiving docetaxel Q 21 days.      1/20/25 CT C/A/P w contrast  1.  Multiple new lung nodules, concerning for metastasis.  2.  Increase in size of a mediastinal lymph node.  3.  Subacute left inferior pubic ramus fracture.      3/3/25 MRI brain BT w wo contrast Increasing FLAIR abnormality in the postsurgical bed in the right frontal lobe now measuring 5.0 x 2.1 cm. Peripheral postcontrast enhancement  in the surgical cavity is noted which is similar in size to the prior study although, the enhancement has   become more intense on the current study which may be related to changes in medication therapy or potentially represents suboptimal gadolinium administration on the prior study. The intensity of enhancement is similar to a more remote MRI dated April 21, 2024. No evidence of abnormal cerebral blood flow or cerebral blood volume. Continued imaging follow-up recommended.   No new lesions are identified.        3/6/25 Dr. Langford     Metastatic adenocarcinoma (HCC)   2023 Initial Diagnosis    Metastatic adenocarcinoma (HCC)     3/23/2023 Biopsy    Brain, right frontal mass (biopsy):  - Metastatic non-small cell carcinoma     Comment:  - Tumor cells stain diffusely for CAM5.2, CKAE1/3, CK7, TTF1 and NapsinA with absent CK20, p63, CK5/6, p40 expression.  This immunopanel favors a metastatic lung adenocarcinoma.       5/18/2023 - 7/20/2023 Chemotherapy    cyanocobalamin, 1,000 mcg, Intramuscular, Once, 3 of 3 cycles  Administration: 1,000 mcg (6/29/2023), 1,000 mcg (5/18/2023), 1,000 mcg (7/20/2023)  alteplase (CATHFLO), 2 mg, Intracatheter, Every 1 Minute as needed, 4 of 4 cycles  pegfilgrastim (NEULASTA ONPRO), 6 mg, Subcutaneous, Once, 3 of 3 cycles  Administration: 6 mg (6/8/2023), 6 mg (6/29/2023), 6 mg (7/20/2023)  fosaprepitant (EMEND) IVPB, 150 mg, Intravenous, Once, 4 of 4 cycles  Administration: 150 mg (5/18/2023), 150 mg (6/29/2023), 150 mg (7/20/2023), 150 mg (6/8/2023)  nivolumab (OPDIVO) IVPB, 360 mg (150 % of original dose 240 mg), Intravenous, Once, 4 of 4 cycles  Dose modification: 360 mg (original dose 240 mg, Cycle 1, Reason: Dose modified as per discussion with consulting physician)  Administration: 360 mg (5/18/2023), 360 mg (6/8/2023), 360 mg (6/29/2023), 360 mg (7/20/2023)  CARBOplatin (PARAPLATIN) IVPB ([facility] AUC DOSING), 642.5 mg, Intravenous, Once, 4 of 4 cycles  Administration: 642.5 mg  (5/18/2023), 642.5 mg (6/29/2023), 566.5 mg (7/20/2023), 650 mg (6/8/2023)  pemetrexed (ALIMTA) chemo infusion, 980 mg, Intravenous, Once, 4 of 4 cycles  Administration: 1,000 mg (5/18/2023), 1,000 mg (6/29/2023), 1,000 mg (7/20/2023), 1,000 mg (6/8/2023)     10/11/2023 - 10/11/2023 Chemotherapy    alteplase (CATHFLO), 2 mg, Intracatheter, Every 1 Minute as needed, 0 of 6 cycles  nivolumab (OPDIVO) IVPB, 240 mg, Intravenous, Once, 0 of 6 cycles     10/13/2023 - 2/23/2024 Chemotherapy    alteplase (CATHFLO), 2 mg, Intracatheter, Every 1 Minute as needed, 5 of 8 cycles  nivolumab (OPDIVO) IVPB, 480 mg, Intravenous, Once, 5 of 8 cycles  Administration: 480 mg (10/13/2023), 480 mg (11/10/2023), 480 mg (12/8/2023), 480 mg (1/5/2024), 480 mg (2/23/2024)     4/16/2024 Biopsy    ENDOBRONCHIAL ULTRASOUND (EBUS)     A-C. Lymph Node, Level 4R (ThinPrep, smear and cell block preparations):    - Metastatic non-small cell carcinoma, most compatible with lung primary; see note.    - Satisfactory for evaluation.     D-F. Lymph Node, Level 2R (ThinPrep, smear and cell block preparations):    - Metastatic non-small cell carcinoma.    - Non-small cell carcinoma, favor adenocarcinoma.    - Satisfactory for evaluation.     G. Lymph Node, Level 11R:    - Atypical cellular changes seen.    - Rare atypical epithelioid cells in a background of abundant benign bronchial cells.    - Lymphocytes present, compatible with adequate lymph node sampling.     5/15/2024 -  Chemotherapy    alteplase (CATHFLO), 2 mg, Intracatheter, Every 1 Minute as needed, 6 of 10 cycles  pegfilgrastim (NEULASTA), 6 mg, Subcutaneous, Once, 1 of 1 cycle  Administration: 6 mg (6/28/2024)  pegfilgrastim (NEULASTA ONPRO), 6 mg, Subcutaneous, Once, 6 of 10 cycles  Administration: 6 mg (5/15/2024), 6 mg (6/27/2024), 6 mg (6/6/2024), 6 mg (7/18/2024), 6 mg (8/8/2024), 6 mg (2/14/2025)  bevacizumab (AVASTIN) IVPB, 5 mg/kg = 457.5 mg (100 % of original dose 5 mg/kg),  Intravenous, Once, 3 of 3 cycles  Dose modification: 5 mg/kg (original dose 5 mg/kg, Cycle 1), 5 mg/kg (original dose 5 mg/kg, Cycle 1), 5 mg/kg (original dose 5 mg/kg, Cycle 2)  Administration: 457.5 mg (5/15/2024), 457.5 mg (5/30/2024), 457.5 mg (6/13/2024), 457.5 mg (6/27/2024)  DOCEtaxel (TAXOTERE) chemo infusion, 75 mg/m2 = 155.2 mg, Intravenous, Once, 6 of 10 cycles  Dose modification: 60 mg/m2 (original dose 75 mg/m2, Cycle 6, Reason: Dose Not Tolerated)  Administration: 155.2 mg (5/15/2024), 155.2 mg (6/27/2024), 155.2 mg (6/6/2024), 155.2 mg (7/18/2024), 155.2 mg (8/8/2024), 124.2 mg (2/14/2025)     Carcinoma of right lung (HCC)   4/13/2023 Initial Diagnosis    Carcinoma of right lung (HCC)     4/13/2023 -  Cancer Staged    Staging form: Lung, AJCC 8th Edition  - Clinical: Stage JAY (cT3, cN1, cM1b) - Signed by James Contreras MD on 4/13/2023 4/19/2023 - 4/28/2023 Radiation    Plan ID Energy Fractions Dose per Fraction (cGy) Dose Correction (cGy) Total Dose Delivered (cGy) Elapsed Days   SRT R Frontal 6X-FFF 5 / 5 600 0 3,000 9         5/18/2023 - 7/20/2023 Chemotherapy    cyanocobalamin, 1,000 mcg, Intramuscular, Once, 3 of 3 cycles  Administration: 1,000 mcg (6/29/2023), 1,000 mcg (5/18/2023), 1,000 mcg (7/20/2023)  alteplase (CATHFLO), 2 mg, Intracatheter, Every 1 Minute as needed, 4 of 4 cycles  pegfilgrastim (NEULASTA ONPRO), 6 mg, Subcutaneous, Once, 3 of 3 cycles  Administration: 6 mg (6/8/2023), 6 mg (6/29/2023), 6 mg (7/20/2023)  fosaprepitant (EMEND) IVPB, 150 mg, Intravenous, Once, 4 of 4 cycles  Administration: 150 mg (5/18/2023), 150 mg (6/29/2023), 150 mg (7/20/2023), 150 mg (6/8/2023)  nivolumab (OPDIVO) IVPB, 360 mg (150 % of original dose 240 mg), Intravenous, Once, 4 of 4 cycles  Dose modification: 360 mg (original dose 240 mg, Cycle 1, Reason: Dose modified as per discussion with consulting physician)  Administration: 360 mg (5/18/2023), 360 mg (6/8/2023), 360 mg (6/29/2023),  360 mg (7/20/2023)  CARBOplatin (PARAPLATIN) IVPB ([facility] AUC DOSING), 642.5 mg, Intravenous, Once, 4 of 4 cycles  Administration: 642.5 mg (5/18/2023), 642.5 mg (6/29/2023), 566.5 mg (7/20/2023), 650 mg (6/8/2023)  pemetrexed (ALIMTA) chemo infusion, 980 mg, Intravenous, Once, 4 of 4 cycles  Administration: 1,000 mg (5/18/2023), 1,000 mg (6/29/2023), 1,000 mg (7/20/2023), 1,000 mg (6/8/2023)     9/1/2023 -  Cancer Staged    Staging form: Lung, AJCC 8th Edition  - Pathologic stage from 9/1/2023: Stage JAY (pT2b, pN2, cM1b) - Signed by Treva Bah PA-C on 9/20/2023  Histopathologic type: Adenocarcinoma, NOS  Stage prefix: Initial diagnosis  Histologic grade (G): G3  Histologic grading system: 4 grade system       9/1/2023 Surgery    Right robotic converted to open upper lobectomy      9/1/2023 Biopsy    A.  Lung, right upper lobe:     - Invasive poorly differentiated solid adenocarcinoma of the lung, 4.4 cm.     - Tumor invades into, but not through, the visceral pleura (PL1), confirmed by special VVG stains.     - Lymphatic channel invasion by tumor identified.     - Metastatic carcinoma present in three of thirteen lymph nodes (3/13).       -- Rare fibrotic granulomas present.     - All margins are negative for tumor.     - Emphysematous changes.     B.  Lymph node, level 8:     - Negative for malignancy (0/1).     C.  Lymph node, level 7, #1:     - Negative for malignancy (0/1).     D.  Lymph node, level 4R:     - Negative for malignancy (0/1).     E.  Lymph node, level 4R, #2:     - Fibrovascular adipose tissue; negative for malignancy.     - No lymphoid tissue identified.     F.  Lymph node, level 2R, #1:     - Metastatic carcinoma present in one of three lymph nodes (1/3).     G.  Lymph node, level 2R, #2:     - Negative for malignancy (0/1).     H.  Lymph node, level 11 posterior:     - Single lymph node positive for metastatic carcinoma (1/1).     I.  Lymph node, portion of level 12 on artery:     - Negative  for malignancy (0/1).     - Non-caseating granulomatous inflammation present.        -- Special stains for acid fast bacilli and fungal organisms are negative.        10/11/2023 - 10/11/2023 Chemotherapy    alteplase (CATHFLO), 2 mg, Intracatheter, Every 1 Minute as needed, 0 of 6 cycles  nivolumab (OPDIVO) IVPB, 240 mg, Intravenous, Once, 0 of 6 cycles     10/13/2023 - 2/23/2024 Chemotherapy    alteplase (CATHFLO), 2 mg, Intracatheter, Every 1 Minute as needed, 5 of 8 cycles  nivolumab (OPDIVO) IVPB, 480 mg, Intravenous, Once, 5 of 8 cycles  Administration: 480 mg (10/13/2023), 480 mg (11/10/2023), 480 mg (12/8/2023), 480 mg (1/5/2024), 480 mg (2/23/2024)     4/16/2024 Biopsy    ENDOBRONCHIAL ULTRASOUND (EBUS)     A-C. Lymph Node, Level 4R (ThinPrep, smear and cell block preparations):    - Metastatic non-small cell carcinoma, most compatible with lung primary; see note.    - Satisfactory for evaluation.     D-F. Lymph Node, Level 2R (ThinPrep, smear and cell block preparations):    - Metastatic non-small cell carcinoma.    - Non-small cell carcinoma, favor adenocarcinoma.    - Satisfactory for evaluation.     G. Lymph Node, Level 11R:    - Atypical cellular changes seen.    - Rare atypical epithelioid cells in a background of abundant benign bronchial cells.    - Lymphocytes present, compatible with adequate lymph node sampling.     5/15/2024 -  Chemotherapy    alteplase (CATHFLO), 2 mg, Intracatheter, Every 1 Minute as needed, 6 of 10 cycles  pegfilgrastim (NEULASTA), 6 mg, Subcutaneous, Once, 1 of 1 cycle  Administration: 6 mg (6/28/2024)  pegfilgrastim (NEULASTA ONPRO), 6 mg, Subcutaneous, Once, 6 of 10 cycles  Administration: 6 mg (5/15/2024), 6 mg (6/27/2024), 6 mg (6/6/2024), 6 mg (7/18/2024), 6 mg (8/8/2024), 6 mg (2/14/2025)  bevacizumab (AVASTIN) IVPB, 5 mg/kg = 457.5 mg (100 % of original dose 5 mg/kg), Intravenous, Once, 3 of 3 cycles  Dose modification: 5 mg/kg (original dose 5 mg/kg, Cycle 1), 5 mg/kg  (original dose 5 mg/kg, Cycle 1), 5 mg/kg (original dose 5 mg/kg, Cycle 2)  Administration: 457.5 mg (5/15/2024), 457.5 mg (5/30/2024), 457.5 mg (6/13/2024), 457.5 mg (6/27/2024)  DOCEtaxel (TAXOTERE) chemo infusion, 75 mg/m2 = 155.2 mg, Intravenous, Once, 6 of 10 cycles  Dose modification: 60 mg/m2 (original dose 75 mg/m2, Cycle 6, Reason: Dose Not Tolerated)  Administration: 155.2 mg (5/15/2024), 155.2 mg (6/27/2024), 155.2 mg (6/6/2024), 155.2 mg (7/18/2024), 155.2 mg (8/8/2024), 124.2 mg (2/14/2025)     9/9/2024 - 10/21/2024 Radiation    Treatments:  Course: C2    Plan ID Energy Fractions Dose per Fraction (cGy) Dose Correction (cGy) Total Dose Delivered (cGy) Elapsed Days   R LUNG Med 6X 30 / 30 200 0 6,000 42      Treatment Dates:  9/9/2024 - 10/21/2024.      Metastasis to brain (HCC)   4/19/2023 - 4/28/2023 Radiation    Plan ID Energy Fractions Dose per Fraction (cGy) Dose Correction (cGy) Total Dose Delivered (cGy) Elapsed Days   SRT R Frontal 6X-FFF 5 / 5 600 0 3,000 9         11/21/2024 Initial Diagnosis    Metastasis to brain (HCC)             Review of Systems   Constitutional:  Positive for fatigue. Negative for chills and fever.   HENT:  Negative for ear pain and sore throat.    Eyes:  Negative for pain and visual disturbance.   Respiratory:  Positive for shortness of breath. Negative for cough.    Cardiovascular:  Negative for chest pain and palpitations.   Gastrointestinal:  Negative for abdominal pain and vomiting.   Genitourinary:  Negative for dysuria and hematuria.   Musculoskeletal:  Negative for arthralgias and back pain.   Skin:  Negative for color change and rash.   Neurological:  Positive for numbness. Negative for seizures and syncope.   All other systems reviewed and are negative.    Medical History Reviewed by provider this encounter:     .  Current Outpatient Medications on File Prior to Visit   Medication Sig Dispense Refill    acetaminophen (TYLENOL) 325 mg tablet Take 2 tablets (597  mg total) by mouth every 6 (six) hours as needed for mild pain or fever      amLODIPine (NORVASC) 10 mg tablet Take 1 tablet (10 mg total) by mouth daily 30 tablet 5    atorvastatin (LIPITOR) 40 mg tablet Take 1 tablet (40 mg total) by mouth daily after dinner 30 tablet 5    dexamethasone (DECADRON) 4 mg tablet Take 2 tablets (8 mg total) by mouth 2 (two) times a day with meals Day before, Day of, and Day after chemotherapy. 12 tablet 5    lacosamide (VIMPAT) 200 mg tablet Take 1 tablet (200 mg total) by mouth every 12 (twelve) hours 60 tablet 5    levETIRAcetam (Keppra) 1000 MG tablet Take 2 tablets (2,000 mg total) by mouth 2 (two) times a day 120 tablet 11    loperamide (IMODIUM A-D) 2 MG tablet Take 2 mg by mouth 4 (four) times a day as needed for diarrhea. Indications: Diarrhea caused by Chemotherapy      losartan (COZAAR) 50 mg tablet Take 1 tablet (50 mg total) by mouth daily 30 tablet 5     Current Facility-Administered Medications on File Prior to Visit   Medication Dose Route Frequency Provider Last Rate Last Admin    alteplase (CATHFLO) injection 2 mg  2 mg Intracatheter Q1MIN PRN Collin Langford MD          Social History     Tobacco Use    Smoking status: Former     Current packs/day: 0.00     Average packs/day: 1 pack/day for 15.0 years (15.0 ttl pk-yrs)     Types: Cigarettes     Start date:      Quit date:      Years since quittin.1     Passive exposure: Never    Smokeless tobacco: Never    Tobacco comments:     i quit when i was 28. not sure of exact dates   Vaping Use    Vaping status: Never Used   Substance and Sexual Activity    Alcohol use: Not Currently     Alcohol/week: 6.0 standard drinks of alcohol     Types: 6 Cans of beer per week     Comment: socially    Drug use: Not Currently     Types: Marijuana     Comment: gummies foro nausea with chemo    Sexual activity: Yes     Partners: Female     Birth control/protection: None         Objective   /80 (BP Location: Right arm,  "Patient Position: Sitting, Cuff Size: Adult)   Pulse 88   Temp 98 °F (36.7 °C)   Resp 20   Ht 5' 9.02\" (1.753 m)   Wt 88 kg (194 lb)   SpO2 94%   BMI 28.63 kg/m²     Pain Screening:  Pain Score: 0-No pain  ECOG   3  Physical Exam  Constitutional:       Appearance: He is well-developed.   HENT:      Head: Normocephalic and atraumatic.      Nose: Nose normal.   Eyes:      General: No scleral icterus.        Right eye: No discharge.         Left eye: No discharge.      Conjunctiva/sclera: Conjunctivae normal.      Pupils: Pupils are equal, round, and reactive to light.   Neck:      Thyroid: No thyromegaly.      Trachea: No tracheal deviation.   Cardiovascular:      Rate and Rhythm: Normal rate and regular rhythm.      Heart sounds: Normal heart sounds. No murmur heard.     No friction rub.   Pulmonary:      Effort: Pulmonary effort is normal. No respiratory distress.      Breath sounds: Normal breath sounds. No wheezing or rales.   Chest:      Chest wall: No tenderness.   Abdominal:      General: There is no distension.      Palpations: Abdomen is soft. There is no hepatomegaly or splenomegaly.      Tenderness: There is no abdominal tenderness. There is no guarding or rebound.   Musculoskeletal:         General: No tenderness or deformity.      Cervical back: Normal range of motion and neck supple.      Comments: Ambulatory dysfunction uses the cane.   Lymphadenopathy:      Cervical: No cervical adenopathy.   Skin:     General: Skin is warm and dry.      Coloration: Skin is not pale.      Findings: No erythema or rash.   Neurological:      Mental Status: He is alert and oriented to person, place, and time.      Cranial Nerves: No cranial nerve deficit.      Coordination: Coordination normal.      Deep Tendon Reflexes: Reflexes are normal and symmetric.   Psychiatric:         Behavior: Behavior normal.         Thought Content: Thought content normal.         Judgment: Judgment normal.         Labs: I have " reviewed the following labs:  Lab Results   Component Value Date/Time    WBC 8.03 03/05/2025 12:03 PM    RBC 4.62 03/05/2025 12:03 PM    Hemoglobin 13.7 03/05/2025 12:03 PM    Hematocrit 42.8 03/05/2025 12:03 PM    MCV 93 03/05/2025 12:03 PM    MCH 29.7 03/05/2025 12:03 PM    RDW 13.9 03/05/2025 12:03 PM    Platelets 310 03/05/2025 12:03 PM    Segmented % 72 03/05/2025 12:03 PM    Lymphocytes % 17 03/05/2025 12:03 PM    Monocytes % 7 03/05/2025 12:03 PM    Eosinophils Relative 0 03/05/2025 12:03 PM    Basophils Relative 2 (H) 03/05/2025 12:03 PM    Immature Grans % 2 03/05/2025 12:03 PM    Absolute Neutrophils 5.76 03/05/2025 12:03 PM     Lab Results   Component Value Date/Time    Potassium 3.8 03/05/2025 12:03 PM    Chloride 106 03/05/2025 12:03 PM    CO2 27 03/05/2025 12:03 PM    BUN 11 03/05/2025 12:03 PM    Creatinine 0.78 03/05/2025 12:03 PM    Glucose, Fasting 107 (H) 12/12/2024 08:31 AM    Calcium 9.0 03/05/2025 12:03 PM    AST 24 03/05/2025 12:03 PM    ALT 19 03/05/2025 12:03 PM    Alkaline Phosphatase 80 03/05/2025 12:03 PM    Total Protein 7.4 03/05/2025 12:03 PM    Albumin 4.4 03/05/2025 12:03 PM    Total Bilirubin 0.70 03/05/2025 12:03 PM    eGFR 90 03/05/2025 12:03 PM     Lab Results   Component Value Date/Time    WBC 8.03 03/05/2025 12:03 PM    RBC 4.62 03/05/2025 12:03 PM    Hemoglobin 13.7 03/05/2025 12:03 PM    Hematocrit 42.8 03/05/2025 12:03 PM    MCV 93 03/05/2025 12:03 PM    MCH 29.7 03/05/2025 12:03 PM    RDW 13.9 03/05/2025 12:03 PM    Platelets 310 03/05/2025 12:03 PM    Segmented % 72 03/05/2025 12:03 PM    Bands % 6 07/17/2024 11:25 AM    Lymphocytes % 17 03/05/2025 12:03 PM    Monocytes % 7 03/05/2025 12:03 PM    Eosinophils Relative 0 03/05/2025 12:03 PM    Basophils Relative 2 (H) 03/05/2025 12:03 PM    Immature Grans % 2 03/05/2025 12:03 PM    Absolute Neutrophils 5.76 03/05/2025 12:03 PM      Lab Results   Component Value Date/Time    Sodium 141 03/05/2025 12:03 PM    Potassium 3.8  "03/05/2025 12:03 PM    Chloride 106 03/05/2025 12:03 PM    CO2 27 03/05/2025 12:03 PM    ANION GAP 8 03/05/2025 12:03 PM    BUN 11 03/05/2025 12:03 PM    Creatinine 0.78 03/05/2025 12:03 PM    Glucose 152 (H) 03/05/2025 12:03 PM    Glucose, Fasting 107 (H) 12/12/2024 08:31 AM    Calcium 9.0 03/05/2025 12:03 PM    AST 24 03/05/2025 12:03 PM    ALT 19 03/05/2025 12:03 PM    Alkaline Phosphatase 80 03/05/2025 12:03 PM    Total Protein 7.4 03/05/2025 12:03 PM    Albumin 4.4 03/05/2025 12:03 PM    Total Bilirubin 0.70 03/05/2025 12:03 PM    eGFR 90 03/05/2025 12:03 PM      No results found for: \"IRON\", \"CONCFE\", \"FERRITIN\", \"[id number]\", \"FOLATE\", \"COPPER\", \"EPOREFLAB\", \"ERYTHROPRO\", \"ESR\", \"CRP\", \"HIVAGAB\", \"HEPATITIS\"           "

## 2025-03-07 ENCOUNTER — HOSPITAL ENCOUNTER (OUTPATIENT)
Dept: INFUSION CENTER | Facility: HOSPITAL | Age: 70
End: 2025-03-07
Attending: INTERNAL MEDICINE
Payer: COMMERCIAL

## 2025-03-07 VITALS
TEMPERATURE: 97.6 F | HEIGHT: 69 IN | DIASTOLIC BLOOD PRESSURE: 70 MMHG | SYSTOLIC BLOOD PRESSURE: 144 MMHG | OXYGEN SATURATION: 97 % | HEART RATE: 80 BPM | BODY MASS INDEX: 29.19 KG/M2 | WEIGHT: 197.09 LBS | RESPIRATION RATE: 18 BRPM

## 2025-03-07 DIAGNOSIS — C79.9 METASTATIC ADENOCARCINOMA (HCC): Primary | ICD-10-CM

## 2025-03-07 DIAGNOSIS — D70.1 CHEMOTHERAPY-INDUCED NEUTROPENIA (HCC): ICD-10-CM

## 2025-03-07 DIAGNOSIS — T45.1X5A CHEMOTHERAPY-INDUCED NEUTROPENIA (HCC): ICD-10-CM

## 2025-03-07 DIAGNOSIS — C34.91 CARCINOMA OF RIGHT LUNG (HCC): ICD-10-CM

## 2025-03-07 PROCEDURE — 96413 CHEMO IV INFUSION 1 HR: CPT

## 2025-03-07 PROCEDURE — 96367 TX/PROPH/DG ADDL SEQ IV INF: CPT

## 2025-03-07 PROCEDURE — 96377 APPLICATON ON-BODY INJECTOR: CPT

## 2025-03-07 RX ORDER — SODIUM CHLORIDE 9 MG/ML
20 INJECTION, SOLUTION INTRAVENOUS ONCE
Status: COMPLETED | OUTPATIENT
Start: 2025-03-07 | End: 2025-03-07

## 2025-03-07 RX ORDER — MAGNESIUM SULFATE HEPTAHYDRATE 40 MG/ML
2 INJECTION, SOLUTION INTRAVENOUS ONCE
Status: COMPLETED | OUTPATIENT
Start: 2025-03-07 | End: 2025-03-07

## 2025-03-07 RX ADMIN — MAGNESIUM SULFATE IN WATER 2 G: 40 INJECTION, SOLUTION INTRAVENOUS at 09:14

## 2025-03-07 RX ADMIN — DOCETAXEL 124.2 MG: 20 INJECTION, SOLUTION, CONCENTRATE INTRAVENOUS at 10:54

## 2025-03-07 RX ADMIN — DEXAMETHASONE SODIUM PHOSPHATE: 10 INJECTION, SOLUTION INTRAMUSCULAR; INTRAVENOUS at 10:18

## 2025-03-07 RX ADMIN — PEGFILGRASTIM 6 MG: KIT SUBCUTANEOUS at 12:06

## 2025-03-07 RX ADMIN — SODIUM CHLORIDE 20 ML/HR: 0.9 INJECTION, SOLUTION INTRAVENOUS at 09:15

## 2025-03-07 NOTE — PROGRESS NOTES
Paras Hong  tolerated IV Magnesium and taxotere without incident. Neulasta On-Pro applied to Right arm with green light flashing on D/C. He is aware when to remove On-Pro.    Paras Pitts is aware of future appt on 3-26-25 at 1130.    AVS printed and given to Paras Pitts

## 2025-03-11 ENCOUNTER — OFFICE VISIT (OUTPATIENT)
Dept: RADIATION ONCOLOGY | Facility: CLINIC | Age: 70
End: 2025-03-11
Attending: STUDENT IN AN ORGANIZED HEALTH CARE EDUCATION/TRAINING PROGRAM
Payer: COMMERCIAL

## 2025-03-11 VITALS
WEIGHT: 196 LBS | DIASTOLIC BLOOD PRESSURE: 74 MMHG | BODY MASS INDEX: 28.93 KG/M2 | RESPIRATION RATE: 16 BRPM | OXYGEN SATURATION: 96 % | SYSTOLIC BLOOD PRESSURE: 120 MMHG | HEART RATE: 95 BPM | TEMPERATURE: 96.1 F

## 2025-03-11 DIAGNOSIS — C79.31 METASTASIS TO BRAIN (HCC): Primary | ICD-10-CM

## 2025-03-11 PROCEDURE — 99211 OFF/OP EST MAY X REQ PHY/QHP: CPT | Performed by: STUDENT IN AN ORGANIZED HEALTH CARE EDUCATION/TRAINING PROGRAM

## 2025-03-11 PROCEDURE — 99213 OFFICE O/P EST LOW 20 MIN: CPT | Performed by: STUDENT IN AN ORGANIZED HEALTH CARE EDUCATION/TRAINING PROGRAM

## 2025-03-11 PROCEDURE — G0463 HOSPITAL OUTPT CLINIC VISIT: HCPCS | Performed by: STUDENT IN AN ORGANIZED HEALTH CARE EDUCATION/TRAINING PROGRAM

## 2025-03-11 NOTE — PROGRESS NOTES
Follow-up Visit   Name: Paras Pitts      : 1955      MRN: [id number]  Encounter Provider: James Contreras MD  Encounter Date: 3/11/2025   Encounter department: [facility] RADIATION ONCOLOGY  :  Assessment & Plan  Metastasis to brain (HCC)  We reviewed the patient's repeat MRI Brain in detail in comparison to multiple prior imaging studies. On my review he has some increased peripheral enhancement in the resection bed compatible with ongoing radionecrosis. He will trial boswellia jesenia and tumeric for this given his mild symptoms at present and preference to avoid restarting dexamethasone. If his symptoms worsen we may need to restart treatment with steroids or discuss surgical excision. I will see him back with repeat MRI Brain in 6 months.   Orders:    MRI Brain BT w wo Contrast; Future        History of Present Illness   Chief Complaint   Patient presents with    Lung Cancer    Follow-up   Pertinent Medical History   Mr. Paras Pitts is a 69 year old man with Stage JAY ([id number]) lung adenocarcinoma s/p resection of a right frontal metastasis followed by SRT. He thereafter underwent right sided lobectomy and MLNDx with pathology demonstrating negative margins, 5/22 LNs involved with SARAHY. He was thereafter continued on immunotherapy until he developed mediastinal POD. On 10/21/24 he completed a course of RT to a dose of 6000cGy in 30 fractions. He returns today for follow-up.     Radiation Summary:  SRT to R Frontal Cav to 3000cGy/5fx, completed 23  RT to Mediastinum to 6000cGy/30fx, completed 10/21/24    Interval History:  The patient was last seen in clinic on 24, at that time doing well with plan for post-treatment PET/CT and MRI Brain.     CT C/A/P (25) demonstrated:  1.  Multiple new lung nodules, concerning for metastasis.  2.  Increase in size of a mediastinal lymph node.  3.  Subacute left inferior pubic ramus fracture.    MRI Brain (3/3/25)  demonstrated:  Increasing FLAIR abnormality in the postsurgical bed in the right frontal lobe now measuring 5.0 x 2.1 cm. Peripheral postcontrast enhancement in the surgical cavity is noted which is similar in size to the prior study although, the enhancement has become more intense on the current study which may be related to changes in medication therapy or potentially represents suboptimal gadolinium administration on the prior study. The intensity of enhancement is similar to a more remote MRI dated April 21, 2024. No evidence of abnormal cerebral blood flow or cerebral blood volume. Continued imaging follow-up recommended.  No new lesions are identified.    Since his last follow-up the patient has been started on docetaxel for his systemic progression of disease. He has had some intermittent headaches and mild waxing and waning left leg weakness.        Oncology History   Cancer Staging   Carcinoma of right lung (HCC)  Staging form: Lung, AJCC 8th Edition  - Clinical: Stage JAY (cT3, cN1, cM1b) - Signed by James Contreras MD on 4/13/2023  - Pathologic stage from 9/1/2023: Stage JAY (pT2b, pN2, cM1b) - Signed by Treva Bah PA-C on 9/20/2023  Histopathologic type: Adenocarcinoma, NOS  Stage prefix: Initial diagnosis  Histologic grade (G): G3  Histologic grading system: 4 grade system  Oncology History   Metastatic adenocarcinoma (HCC)   2023 Initial Diagnosis    Metastatic adenocarcinoma (HCC)     3/23/2023 Biopsy    Brain, right frontal mass (biopsy):  - Metastatic non-small cell carcinoma     Comment:  - Tumor cells stain diffusely for CAM5.2, CKAE1/3, CK7, TTF1 and NapsinA with absent CK20, p63, CK5/6, p40 expression.  This immunopanel favors a metastatic lung adenocarcinoma.       5/18/2023 - 7/20/2023 Chemotherapy    cyanocobalamin, 1,000 mcg, Intramuscular, Once, 3 of 3 cycles  Administration: 1,000 mcg (6/29/2023), 1,000 mcg (5/18/2023), 1,000 mcg (7/20/2023)  alteplase (CATHFLO), 2 mg,  Intracatheter, Every 1 Minute as needed, 4 of 4 cycles  pegfilgrastim (NEULASTA ONPRO), 6 mg, Subcutaneous, Once, 3 of 3 cycles  Administration: 6 mg (6/8/2023), 6 mg (6/29/2023), 6 mg (7/20/2023)  fosaprepitant (EMEND) IVPB, 150 mg, Intravenous, Once, 4 of 4 cycles  Administration: 150 mg (5/18/2023), 150 mg (6/29/2023), 150 mg (7/20/2023), 150 mg (6/8/2023)  nivolumab (OPDIVO) IVPB, 360 mg (150 % of original dose 240 mg), Intravenous, Once, 4 of 4 cycles  Dose modification: 360 mg (original dose 240 mg, Cycle 1, Reason: Dose modified as per discussion with consulting physician)  Administration: 360 mg (5/18/2023), 360 mg (6/8/2023), 360 mg (6/29/2023), 360 mg (7/20/2023)  CARBOplatin (PARAPLATIN) IVPB (GOG AUC DOSING), 642.5 mg, Intravenous, Once, 4 of 4 cycles  Administration: 642.5 mg (5/18/2023), 642.5 mg (6/29/2023), 566.5 mg (7/20/2023), 650 mg (6/8/2023)  pemetrexed (ALIMTA) chemo infusion, 980 mg, Intravenous, Once, 4 of 4 cycles  Administration: 1,000 mg (5/18/2023), 1,000 mg (6/29/2023), 1,000 mg (7/20/2023), 1,000 mg (6/8/2023)     10/11/2023 - 10/11/2023 Chemotherapy    alteplase (CATHFLO), 2 mg, Intracatheter, Every 1 Minute as needed, 0 of 6 cycles  nivolumab (OPDIVO) IVPB, 240 mg, Intravenous, Once, 0 of 6 cycles     10/13/2023 - 2/23/2024 Chemotherapy    alteplase (CATHFLO), 2 mg, Intracatheter, Every 1 Minute as needed, 5 of 8 cycles  nivolumab (OPDIVO) IVPB, 480 mg, Intravenous, Once, 5 of 8 cycles  Administration: 480 mg (10/13/2023), 480 mg (11/10/2023), 480 mg (12/8/2023), 480 mg (1/5/2024), 480 mg (2/23/2024)     4/16/2024 Biopsy    ENDOBRONCHIAL ULTRASOUND (EBUS)     A-C. Lymph Node, Level 4R (ThinPrep, smear and cell block preparations):    - Metastatic non-small cell carcinoma, most compatible with lung primary; see note.    - Satisfactory for evaluation.     D-F. Lymph Node, Level 2R (ThinPrep, smear and cell block preparations):    - Metastatic non-small cell carcinoma.    - Non-small  cell carcinoma, favor adenocarcinoma.    - Satisfactory for evaluation.     G. Lymph Node, Level 11R:    - Atypical cellular changes seen.    - Rare atypical epithelioid cells in a background of abundant benign bronchial cells.    - Lymphocytes present, compatible with adequate lymph node sampling.     5/15/2024 -  Chemotherapy    alteplase (CATHFLO), 2 mg, Intracatheter, Every 1 Minute as needed, 7 of 10 cycles  pegfilgrastim (NEULASTA), 6 mg, Subcutaneous, Once, 1 of 1 cycle  Administration: 6 mg (6/28/2024)  pegfilgrastim (NEULASTA ONPRO), 6 mg, Subcutaneous, Once, 7 of 10 cycles  Administration: 6 mg (5/15/2024), 6 mg (6/27/2024), 6 mg (6/6/2024), 6 mg (7/18/2024), 6 mg (8/8/2024), 6 mg (2/14/2025), 6 mg (3/7/2025)  bevacizumab (AVASTIN) IVPB, 5 mg/kg = 457.5 mg (100 % of original dose 5 mg/kg), Intravenous, Once, 3 of 3 cycles  Dose modification: 5 mg/kg (original dose 5 mg/kg, Cycle 1), 5 mg/kg (original dose 5 mg/kg, Cycle 1), 5 mg/kg (original dose 5 mg/kg, Cycle 2)  Administration: 457.5 mg (5/15/2024), 457.5 mg (5/30/2024), 457.5 mg (6/13/2024), 457.5 mg (6/27/2024)  DOCEtaxel (TAXOTERE) chemo infusion, 75 mg/m2 = 155.2 mg, Intravenous, Once, 7 of 10 cycles  Dose modification: 60 mg/m2 (original dose 75 mg/m2, Cycle 6, Reason: Dose Not Tolerated)  Administration: 155.2 mg (5/15/2024), 155.2 mg (6/27/2024), 155.2 mg (6/6/2024), 155.2 mg (7/18/2024), 155.2 mg (8/8/2024), 124.2 mg (2/14/2025), 124.2 mg (3/7/2025)     Carcinoma of right lung (HCC)   4/13/2023 Initial Diagnosis    Carcinoma of right lung (HCC)     4/13/2023 -  Cancer Staged    Staging form: Lung, AJCC 8th Edition  - Clinical: Stage JAY (cT3, cN1, cM1b) - Signed by James Contreras MD on 4/13/2023 4/19/2023 - 4/28/2023 Radiation    Plan ID Energy Fractions Dose per Fraction (cGy) Dose Correction (cGy) Total Dose Delivered (cGy) Elapsed Days   SRT R Frontal 6X-FFF 5 / 5 600 0 3,000 9         5/18/2023 - 7/20/2023 Chemotherapy     cyanocobalamin, 1,000 mcg, Intramuscular, Once, 3 of 3 cycles  Administration: 1,000 mcg (6/29/2023), 1,000 mcg (5/18/2023), 1,000 mcg (7/20/2023)  alteplase (CATHFLO), 2 mg, Intracatheter, Every 1 Minute as needed, 4 of 4 cycles  pegfilgrastim (NEULASTA ONPRO), 6 mg, Subcutaneous, Once, 3 of 3 cycles  Administration: 6 mg (6/8/2023), 6 mg (6/29/2023), 6 mg (7/20/2023)  fosaprepitant (EMEND) IVPB, 150 mg, Intravenous, Once, 4 of 4 cycles  Administration: 150 mg (5/18/2023), 150 mg (6/29/2023), 150 mg (7/20/2023), 150 mg (6/8/2023)  nivolumab (OPDIVO) IVPB, 360 mg (150 % of original dose 240 mg), Intravenous, Once, 4 of 4 cycles  Dose modification: 360 mg (original dose 240 mg, Cycle 1, Reason: Dose modified as per discussion with consulting physician)  Administration: 360 mg (5/18/2023), 360 mg (6/8/2023), 360 mg (6/29/2023), 360 mg (7/20/2023)  CARBOplatin (PARAPLATIN) IVPB (GOG AUC DOSING), 642.5 mg, Intravenous, Once, 4 of 4 cycles  Administration: 642.5 mg (5/18/2023), 642.5 mg (6/29/2023), 566.5 mg (7/20/2023), 650 mg (6/8/2023)  pemetrexed (ALIMTA) chemo infusion, 980 mg, Intravenous, Once, 4 of 4 cycles  Administration: 1,000 mg (5/18/2023), 1,000 mg (6/29/2023), 1,000 mg (7/20/2023), 1,000 mg (6/8/2023)     9/1/2023 -  Cancer Staged    Staging form: Lung, AJCC 8th Edition  - Pathologic stage from 9/1/2023: Stage JAY (pT2b, pN2, cM1b) - Signed by Treva Bah PA-C on 9/20/2023  Histopathologic type: Adenocarcinoma, NOS  Stage prefix: Initial diagnosis  Histologic grade (G): G3  Histologic grading system: 4 grade system       9/1/2023 Surgery    Right robotic converted to open upper lobectomy      9/1/2023 Biopsy    A.  Lung, right upper lobe:     - Invasive poorly differentiated solid adenocarcinoma of the lung, 4.4 cm.     - Tumor invades into, but not through, the visceral pleura (PL1), confirmed by special VVG stains.     - Lymphatic channel invasion by tumor identified.     - Metastatic carcinoma  present in three of thirteen lymph nodes (3/13).       -- Rare fibrotic granulomas present.     - All margins are negative for tumor.     - Emphysematous changes.     B.  Lymph node, level 8:     - Negative for malignancy (0/1).     C.  Lymph node, level 7, #1:     - Negative for malignancy (0/1).     D.  Lymph node, level 4R:     - Negative for malignancy (0/1).     E.  Lymph node, level 4R, #2:     - Fibrovascular adipose tissue; negative for malignancy.     - No lymphoid tissue identified.     F.  Lymph node, level 2R, #1:     - Metastatic carcinoma present in one of three lymph nodes (1/3).     G.  Lymph node, level 2R, #2:     - Negative for malignancy (0/1).     H.  Lymph node, level 11 posterior:     - Single lymph node positive for metastatic carcinoma (1/1).     I.  Lymph node, portion of level 12 on artery:     - Negative for malignancy (0/1).     - Non-caseating granulomatous inflammation present.        -- Special stains for acid fast bacilli and fungal organisms are negative.        10/11/2023 - 10/11/2023 Chemotherapy    alteplase (CATHFLO), 2 mg, Intracatheter, Every 1 Minute as needed, 0 of 6 cycles  nivolumab (OPDIVO) IVPB, 240 mg, Intravenous, Once, 0 of 6 cycles     10/13/2023 - 2/23/2024 Chemotherapy    alteplase (CATHFLO), 2 mg, Intracatheter, Every 1 Minute as needed, 5 of 8 cycles  nivolumab (OPDIVO) IVPB, 480 mg, Intravenous, Once, 5 of 8 cycles  Administration: 480 mg (10/13/2023), 480 mg (11/10/2023), 480 mg (12/8/2023), 480 mg (1/5/2024), 480 mg (2/23/2024)     4/16/2024 Biopsy    ENDOBRONCHIAL ULTRASOUND (EBUS)     A-C. Lymph Node, Level 4R (ThinPrep, smear and cell block preparations):    - Metastatic non-small cell carcinoma, most compatible with lung primary; see note.    - Satisfactory for evaluation.     D-F. Lymph Node, Level 2R (ThinPrep, smear and cell block preparations):    - Metastatic non-small cell carcinoma.    - Non-small cell carcinoma, favor adenocarcinoma.    -  Satisfactory for evaluation.     G. Lymph Node, Level 11R:    - Atypical cellular changes seen.    - Rare atypical epithelioid cells in a background of abundant benign bronchial cells.    - Lymphocytes present, compatible with adequate lymph node sampling.     5/15/2024 -  Chemotherapy    alteplase (CATHFLO), 2 mg, Intracatheter, Every 1 Minute as needed, 7 of 10 cycles  pegfilgrastim (NEULASTA), 6 mg, Subcutaneous, Once, 1 of 1 cycle  Administration: 6 mg (6/28/2024)  pegfilgrastim (NEULASTA ONPRO), 6 mg, Subcutaneous, Once, 7 of 10 cycles  Administration: 6 mg (5/15/2024), 6 mg (6/27/2024), 6 mg (6/6/2024), 6 mg (7/18/2024), 6 mg (8/8/2024), 6 mg (2/14/2025), 6 mg (3/7/2025)  bevacizumab (AVASTIN) IVPB, 5 mg/kg = 457.5 mg (100 % of original dose 5 mg/kg), Intravenous, Once, 3 of 3 cycles  Dose modification: 5 mg/kg (original dose 5 mg/kg, Cycle 1), 5 mg/kg (original dose 5 mg/kg, Cycle 1), 5 mg/kg (original dose 5 mg/kg, Cycle 2)  Administration: 457.5 mg (5/15/2024), 457.5 mg (5/30/2024), 457.5 mg (6/13/2024), 457.5 mg (6/27/2024)  DOCEtaxel (TAXOTERE) chemo infusion, 75 mg/m2 = 155.2 mg, Intravenous, Once, 7 of 10 cycles  Dose modification: 60 mg/m2 (original dose 75 mg/m2, Cycle 6, Reason: Dose Not Tolerated)  Administration: 155.2 mg (5/15/2024), 155.2 mg (6/27/2024), 155.2 mg (6/6/2024), 155.2 mg (7/18/2024), 155.2 mg (8/8/2024), 124.2 mg (2/14/2025), 124.2 mg (3/7/2025)     9/9/2024 - 10/21/2024 Radiation    Treatments:  Course: C2    Plan ID Energy Fractions Dose per Fraction (cGy) Dose Correction (cGy) Total Dose Delivered (cGy) Elapsed Days   R LUNG Med 6X 30 / 30 200 0 6,000 42      Treatment Dates:  9/9/2024 - 10/21/2024.      Metastasis to brain (HCC)   4/19/2023 - 4/28/2023 Radiation    Plan ID Energy Fractions Dose per Fraction (cGy) Dose Correction (cGy) Total Dose Delivered (cGy) Elapsed Days   SRT R Frontal 6X-FFF 5 / 5 600 0 3,000 9         11/21/2024 Initial Diagnosis    Metastasis to brain  "([facility])        Review of Systems Refer to nursing note.          Objective   /74   Pulse 95   Temp (!) 96.1 °F (35.6 °C)   Resp 16   Wt 88.9 kg (196 lb)   SpO2 96%   BMI 28.93 kg/m²     Pain Screening:  Pain Score: 0-No pain  ECOG ECOG Performance Status: 2 - Ambulatory and capable of all selfcare but unable to carry out any work activities.  Up and about more than 50% of waking hours  Physical Exam   Well appearing. NAD.   No increased work of breathing.   Extremities warm and well perfused.     Administrative Statements   I have spent a total time of 22 minutes in caring for this patient on the day of the visit/encounter including Diagnostic results, Documenting in the medical record, Reviewing/placing orders in the medical record (including tests, medications, and/or procedures), and Obtaining or reviewing history  .  Portions of the record may have been created with voice recognition software.  Occasional wrong word or \"sound a like\" substitutions may have occurred due to the inherent limitations of voice recognition software.  Read the chart carefully and recognize, using context, where substitutions have occurred.  "

## 2025-03-11 NOTE — ASSESSMENT & PLAN NOTE
We reviewed the patient's repeat MRI Brain in detail in comparison to multiple prior imaging studies. On my review he has some increased peripheral enhancement in the resection bed compatible with ongoing radionecrosis. He will trial boswellia jesenia and tumeric for this given his mild symptoms at present and preference to avoid restarting dexamethasone. If his symptoms worsen we may need to restart treatment with steroids or discuss surgical excision. I will see him back with repeat MRI Brain in 6 months.   Orders:    MRI Brain BT w wo Contrast; Future

## 2025-03-11 NOTE — PROGRESS NOTES
Paras Pitts 1955 is a 69 y.o. male with Stage JAY ([id number]) lung adenocarcinoma s/p resection of a right frontal metastasis followed by SRT. He thereafter underwent right sided lobectomy and MLNDx with pathology demonstrating negative margins, 5/22 LNs involved with SARAHY. He was thereafter continued on immunotherapy until he developed mediastinal POD. On 10/21/24 he completed a course of RT to a dose of 6000cGy in 30 fractions. He was last seen 11/21/24 and returns today for follow up.    He is currently receiving docetaxel Q 21 days.      1/20/25 CT C/A/P w contrast  1.  Multiple new lung nodules, concerning for metastasis.  2.  Increase in size of a mediastinal lymph node.  3.  Subacute left inferior pubic ramus fracture.      3/3/25 MRI brain BT w wo contrast   Increasing FLAIR abnormality in the postsurgical bed in the right frontal lobe now measuring 5.0 x 2.1 cm. Peripheral postcontrast enhancement in the surgical cavity is noted which is similar in size to the prior study although, the enhancement has   become more intense on the current study which may be related to changes in medication therapy or potentially represents suboptimal gadolinium administration on the prior study. The intensity of enhancement is similar to a more remote MRI dated April 21, 2024. No evidence of abnormal cerebral blood flow or cerebral blood volume. Continued imaging follow-up recommended.   No new lesions are identified.        3/6/25 Dr. Langford  The patient was restarted on adjusted dose docetaxel on 2/14/2025.  He seems to be tolerating the current chemotherapy with expected side effects.  We did discuss continue the current palliative chemotherapy and obtaining a CT scan of the chest abdomen pelvis around the end of April to evaluate the status of his disease on the current line of palliative chemotherapy.        Follow up visit     Oncology History   Metastatic adenocarcinoma (HCC)   2023 Initial Diagnosis     Metastatic adenocarcinoma (HCC)     3/23/2023 Biopsy    Brain, right frontal mass (biopsy):  - Metastatic non-small cell carcinoma     Comment:  - Tumor cells stain diffusely for CAM5.2, CKAE1/3, CK7, TTF1 and NapsinA with absent CK20, p63, CK5/6, p40 expression.  This immunopanel favors a metastatic lung adenocarcinoma.       5/18/2023 - 7/20/2023 Chemotherapy    cyanocobalamin, 1,000 mcg, Intramuscular, Once, 3 of 3 cycles  Administration: 1,000 mcg (6/29/2023), 1,000 mcg (5/18/2023), 1,000 mcg (7/20/2023)  alteplase (CATHFLO), 2 mg, Intracatheter, Every 1 Minute as needed, 4 of 4 cycles  pegfilgrastim (NEULASTA ONPRO), 6 mg, Subcutaneous, Once, 3 of 3 cycles  Administration: 6 mg (6/8/2023), 6 mg (6/29/2023), 6 mg (7/20/2023)  fosaprepitant (EMEND) IVPB, 150 mg, Intravenous, Once, 4 of 4 cycles  Administration: 150 mg (5/18/2023), 150 mg (6/29/2023), 150 mg (7/20/2023), 150 mg (6/8/2023)  nivolumab (OPDIVO) IVPB, 360 mg (150 % of original dose 240 mg), Intravenous, Once, 4 of 4 cycles  Dose modification: 360 mg (original dose 240 mg, Cycle 1, Reason: Dose modified as per discussion with consulting physician)  Administration: 360 mg (5/18/2023), 360 mg (6/8/2023), 360 mg (6/29/2023), 360 mg (7/20/2023)  CARBOplatin (PARAPLATIN) IVPB (GOG AUC DOSING), 642.5 mg, Intravenous, Once, 4 of 4 cycles  Administration: 642.5 mg (5/18/2023), 642.5 mg (6/29/2023), 566.5 mg (7/20/2023), 650 mg (6/8/2023)  pemetrexed (ALIMTA) chemo infusion, 980 mg, Intravenous, Once, 4 of 4 cycles  Administration: 1,000 mg (5/18/2023), 1,000 mg (6/29/2023), 1,000 mg (7/20/2023), 1,000 mg (6/8/2023)     10/11/2023 - 10/11/2023 Chemotherapy    alteplase (CATHFLO), 2 mg, Intracatheter, Every 1 Minute as needed, 0 of 6 cycles  nivolumab (OPDIVO) IVPB, 240 mg, Intravenous, Once, 0 of 6 cycles     10/13/2023 - 2/23/2024 Chemotherapy    alteplase (CATHFLO), 2 mg, Intracatheter, Every 1 Minute as needed, 5 of 8 cycles  nivolumab (OPDIVO) IVPB, 480 mg,  Intravenous, Once, 5 of 8 cycles  Administration: 480 mg (10/13/2023), 480 mg (11/10/2023), 480 mg (12/8/2023), 480 mg (1/5/2024), 480 mg (2/23/2024)     4/16/2024 Biopsy    ENDOBRONCHIAL ULTRASOUND (EBUS)     A-C. Lymph Node, Level 4R (ThinPrep, smear and cell block preparations):    - Metastatic non-small cell carcinoma, most compatible with lung primary; see note.    - Satisfactory for evaluation.     D-F. Lymph Node, Level 2R (ThinPrep, smear and cell block preparations):    - Metastatic non-small cell carcinoma.    - Non-small cell carcinoma, favor adenocarcinoma.    - Satisfactory for evaluation.     G. Lymph Node, Level 11R:    - Atypical cellular changes seen.    - Rare atypical epithelioid cells in a background of abundant benign bronchial cells.    - Lymphocytes present, compatible with adequate lymph node sampling.     5/15/2024 -  Chemotherapy    alteplase (CATHFLO), 2 mg, Intracatheter, Every 1 Minute as needed, 7 of 10 cycles  pegfilgrastim (NEULASTA), 6 mg, Subcutaneous, Once, 1 of 1 cycle  Administration: 6 mg (6/28/2024)  pegfilgrastim (NEULASTA ONPRO), 6 mg, Subcutaneous, Once, 7 of 10 cycles  Administration: 6 mg (5/15/2024), 6 mg (6/27/2024), 6 mg (6/6/2024), 6 mg (7/18/2024), 6 mg (8/8/2024), 6 mg (2/14/2025), 6 mg (3/7/2025)  bevacizumab (AVASTIN) IVPB, 5 mg/kg = 457.5 mg (100 % of original dose 5 mg/kg), Intravenous, Once, 3 of 3 cycles  Dose modification: 5 mg/kg (original dose 5 mg/kg, Cycle 1), 5 mg/kg (original dose 5 mg/kg, Cycle 1), 5 mg/kg (original dose 5 mg/kg, Cycle 2)  Administration: 457.5 mg (5/15/2024), 457.5 mg (5/30/2024), 457.5 mg (6/13/2024), 457.5 mg (6/27/2024)  DOCEtaxel (TAXOTERE) chemo infusion, 75 mg/m2 = 155.2 mg, Intravenous, Once, 7 of 10 cycles  Dose modification: 60 mg/m2 (original dose 75 mg/m2, Cycle 6, Reason: Dose Not Tolerated)  Administration: 155.2 mg (5/15/2024), 155.2 mg (6/27/2024), 155.2 mg (6/6/2024), 155.2 mg (7/18/2024), 155.2 mg (8/8/2024), 124.2 mg  (2/14/2025), 124.2 mg (3/7/2025)     Carcinoma of right lung (HCC)   4/13/2023 Initial Diagnosis    Carcinoma of right lung (HCC)     4/13/2023 -  Cancer Staged    Staging form: Lung, AJCC 8th Edition  - Clinical: Stage JAY (cT3, cN1, cM1b) - Signed by James Contreras MD on 4/13/2023 4/19/2023 - 4/28/2023 Radiation    Plan ID Energy Fractions Dose per Fraction (cGy) Dose Correction (cGy) Total Dose Delivered (cGy) Elapsed Days   SRT R Frontal 6X-FFF 5 / 5 600 0 3,000 9         5/18/2023 - 7/20/2023 Chemotherapy    cyanocobalamin, 1,000 mcg, Intramuscular, Once, 3 of 3 cycles  Administration: 1,000 mcg (6/29/2023), 1,000 mcg (5/18/2023), 1,000 mcg (7/20/2023)  alteplase (CATHFLO), 2 mg, Intracatheter, Every 1 Minute as needed, 4 of 4 cycles  pegfilgrastim (NEULASTA ONPRO), 6 mg, Subcutaneous, Once, 3 of 3 cycles  Administration: 6 mg (6/8/2023), 6 mg (6/29/2023), 6 mg (7/20/2023)  fosaprepitant (EMEND) IVPB, 150 mg, Intravenous, Once, 4 of 4 cycles  Administration: 150 mg (5/18/2023), 150 mg (6/29/2023), 150 mg (7/20/2023), 150 mg (6/8/2023)  nivolumab (OPDIVO) IVPB, 360 mg (150 % of original dose 240 mg), Intravenous, Once, 4 of 4 cycles  Dose modification: 360 mg (original dose 240 mg, Cycle 1, Reason: Dose modified as per discussion with consulting physician)  Administration: 360 mg (5/18/2023), 360 mg (6/8/2023), 360 mg (6/29/2023), 360 mg (7/20/2023)  CARBOplatin (PARAPLATIN) IVPB (GOG AUC DOSING), 642.5 mg, Intravenous, Once, 4 of 4 cycles  Administration: 642.5 mg (5/18/2023), 642.5 mg (6/29/2023), 566.5 mg (7/20/2023), 650 mg (6/8/2023)  pemetrexed (ALIMTA) chemo infusion, 980 mg, Intravenous, Once, 4 of 4 cycles  Administration: 1,000 mg (5/18/2023), 1,000 mg (6/29/2023), 1,000 mg (7/20/2023), 1,000 mg (6/8/2023)     9/1/2023 -  Cancer Staged    Staging form: Lung, AJCC 8th Edition  - Pathologic stage from 9/1/2023: Stage JAY (pT2b, pN2, cM1b) - Signed by Treva Bah PA-C on  9/20/2023  Histopathologic type: Adenocarcinoma, NOS  Stage prefix: Initial diagnosis  Histologic grade (G): G3  Histologic grading system: 4 grade system       9/1/2023 Surgery    Right robotic converted to open upper lobectomy      9/1/2023 Biopsy    A.  Lung, right upper lobe:     - Invasive poorly differentiated solid adenocarcinoma of the lung, 4.4 cm.     - Tumor invades into, but not through, the visceral pleura (PL1), confirmed by special VVG stains.     - Lymphatic channel invasion by tumor identified.     - Metastatic carcinoma present in three of thirteen lymph nodes (3/13).       -- Rare fibrotic granulomas present.     - All margins are negative for tumor.     - Emphysematous changes.     B.  Lymph node, level 8:     - Negative for malignancy (0/1).     C.  Lymph node, level 7, #1:     - Negative for malignancy (0/1).     D.  Lymph node, level 4R:     - Negative for malignancy (0/1).     E.  Lymph node, level 4R, #2:     - Fibrovascular adipose tissue; negative for malignancy.     - No lymphoid tissue identified.     F.  Lymph node, level 2R, #1:     - Metastatic carcinoma present in one of three lymph nodes (1/3).     G.  Lymph node, level 2R, #2:     - Negative for malignancy (0/1).     H.  Lymph node, level 11 posterior:     - Single lymph node positive for metastatic carcinoma (1/1).     I.  Lymph node, portion of level 12 on artery:     - Negative for malignancy (0/1).     - Non-caseating granulomatous inflammation present.        -- Special stains for acid fast bacilli and fungal organisms are negative.        10/11/2023 - 10/11/2023 Chemotherapy    alteplase (CATHFLO), 2 mg, Intracatheter, Every 1 Minute as needed, 0 of 6 cycles  nivolumab (OPDIVO) IVPB, 240 mg, Intravenous, Once, 0 of 6 cycles     10/13/2023 - 2/23/2024 Chemotherapy    alteplase (CATHFLO), 2 mg, Intracatheter, Every 1 Minute as needed, 5 of 8 cycles  nivolumab (OPDIVO) IVPB, 480 mg, Intravenous, Once, 5 of 8  cycles  Administration: 480 mg (10/13/2023), 480 mg (11/10/2023), 480 mg (12/8/2023), 480 mg (1/5/2024), 480 mg (2/23/2024)     4/16/2024 Biopsy    ENDOBRONCHIAL ULTRASOUND (EBUS)     A-C. Lymph Node, Level 4R (ThinPrep, smear and cell block preparations):    - Metastatic non-small cell carcinoma, most compatible with lung primary; see note.    - Satisfactory for evaluation.     D-F. Lymph Node, Level 2R (ThinPrep, smear and cell block preparations):    - Metastatic non-small cell carcinoma.    - Non-small cell carcinoma, favor adenocarcinoma.    - Satisfactory for evaluation.     G. Lymph Node, Level 11R:    - Atypical cellular changes seen.    - Rare atypical epithelioid cells in a background of abundant benign bronchial cells.    - Lymphocytes present, compatible with adequate lymph node sampling.     5/15/2024 -  Chemotherapy    alteplase (CATHFLO), 2 mg, Intracatheter, Every 1 Minute as needed, 7 of 10 cycles  pegfilgrastim (NEULASTA), 6 mg, Subcutaneous, Once, 1 of 1 cycle  Administration: 6 mg (6/28/2024)  pegfilgrastim (NEULASTA ONPRO), 6 mg, Subcutaneous, Once, 7 of 10 cycles  Administration: 6 mg (5/15/2024), 6 mg (6/27/2024), 6 mg (6/6/2024), 6 mg (7/18/2024), 6 mg (8/8/2024), 6 mg (2/14/2025), 6 mg (3/7/2025)  bevacizumab (AVASTIN) IVPB, 5 mg/kg = 457.5 mg (100 % of original dose 5 mg/kg), Intravenous, Once, 3 of 3 cycles  Dose modification: 5 mg/kg (original dose 5 mg/kg, Cycle 1), 5 mg/kg (original dose 5 mg/kg, Cycle 1), 5 mg/kg (original dose 5 mg/kg, Cycle 2)  Administration: 457.5 mg (5/15/2024), 457.5 mg (5/30/2024), 457.5 mg (6/13/2024), 457.5 mg (6/27/2024)  DOCEtaxel (TAXOTERE) chemo infusion, 75 mg/m2 = 155.2 mg, Intravenous, Once, 7 of 10 cycles  Dose modification: 60 mg/m2 (original dose 75 mg/m2, Cycle 6, Reason: Dose Not Tolerated)  Administration: 155.2 mg (5/15/2024), 155.2 mg (6/27/2024), 155.2 mg (6/6/2024), 155.2 mg (7/18/2024), 155.2 mg (8/8/2024), 124.2 mg (2/14/2025), 124.2 mg  (3/7/2025)     9/9/2024 - 10/21/2024 Radiation    Treatments:  Course: C2    Plan ID Energy Fractions Dose per Fraction (cGy) Dose Correction (cGy) Total Dose Delivered (cGy) Elapsed Days   R LUNG Med 6X 30 / 30 200 0 6,000 42      Treatment Dates:  9/9/2024 - 10/21/2024.      Metastasis to brain (HCC)   4/19/2023 - 4/28/2023 Radiation    Plan ID Energy Fractions Dose per Fraction (cGy) Dose Correction (cGy) Total Dose Delivered (cGy) Elapsed Days   SRT R Frontal 6X-FFF 5 / 5 600 0 3,000 9         11/21/2024 Initial Diagnosis    Metastasis to brain (HCC)         Review of Systems:  Review of Systems   Respiratory:  Positive for cough and shortness of breath. Negative for chest tightness and wheezing.    Neurological:  Positive for dizziness, light-headedness, numbness (B/L fingers and toes) and headaches.       Clinical Trial: no          Health Maintenance   Topic Date Due    RSV Vaccine for Pregnant Patients and Patients Age 60+ Years (1 - Risk 60-74 years 1-dose series) Never done    Falls: Plan of Care  Never done    BMI: Followup Plan  10/20/2024    COVID-19 Vaccine (4 - 2024-25 season) 12/12/2024    Fall Risk  12/16/2025    Depression Screening  12/16/2025    Annual Physical  12/16/2025    BMI: Adult  03/07/2026    DTaP,Tdap,and Td Vaccines (2 - Td or Tdap) 04/26/2028    Colorectal Cancer Screening  08/10/2031    Hepatitis C Screening  Completed    Zoster Vaccine  Completed    Pneumococcal Vaccine: 65+ Years  Completed    Influenza Vaccine  Completed    Meningococcal B Vaccine  Aged Out    RSV Vaccine age 0-20 Months  Aged Out    HIB Vaccine  Aged Out    IPV Vaccine  Aged Out    Hepatitis A Vaccine  Aged Out    Meningococcal ACWY Vaccine  Aged Out    HPV Vaccine  Aged Out     Patient Active Problem List   Diagnosis    Negative depression screening    Overweight (BMI 25.0-29.9)    Essential hypertension    Annual physical exam    Hypercholesterolemia    Brain mass    Lung nodule    Metastatic adenocarcinoma  (HCC)    Carcinoma of right lung (HCC)    Encounter for central line care    Chemotherapy-induced neutropenia (HCC)    History of CVA (cerebrovascular accident)    Generalized weakness    Tremors of nervous system    Seizure-like activity (HCC)    Constipation    Frontal mass of brain    Seizure disorder (HCC)    Left hemiparesis (HCC)    Pseudobulbar affect    Adjustment disorder    Sore throat    Bilateral edema of lower extremity    Cognitive impairment    Pancytopenia (HCC)    Shortness of breath    GERD (gastroesophageal reflux disease)    Metastasis to brain (HCC)     Past Medical History:   Diagnosis Date    Brain compression (HCC) 03/20/2023    Brain mass 03/20/2023    Cancer (HCC) 2001    Receint lung and brain lesions and prostate cancer in 2001    Cerebral edema (HCC) 03/20/2023    Hypertension     Lung cancer (HCC)     Prostate cancer (HCC) 2001    Rectal bleeding     Stroke (HCC)     2011       Past Surgical History:   Procedure Laterality Date    COLONOSCOPY      CRANIOTOMY Right 3/23/2023    Procedure: Right frontal CRANIOTOMY IMAGE-GUIDED FOR TUMOR;  Surgeon: Clark Carrillo MD;  Location: [facility];  Service: Neurosurgery    ENDOBRONCHIAL ULTRASOUND (EBUS) N/A 4/16/2024    Procedure: ENDOBRONCHIAL ULTRASOUND (EBUS);  Surgeon: Kalia Sparrow MD;  Location: [facility];  Service: Thoracic    IR PORT PLACEMENT  4/27/2023    NJ BRNCC INCL FLUOR GDNCE DX W/CELL WASHG SPX N/A 9/1/2023    Procedure: BRONCHOSCOPY FLEXIBLE;  Surgeon: Kalia Sparrow MD;  Location: [facility];  Service: Thoracic    NJ BRNCHSC INCL FLUOR GDNCE DX W/CELL WASHG SPX N/A 4/16/2024    Procedure: BRONCHOSCOPY FLEXIBLE;  Surgeon: Kalia Sparrow MD;  [facility];  Service: Thoracic    NJ CYSTOURETHROSCOPY N/A 3/23/2023    Procedure: EUA, DEL CASTILLO INSERTION;  Surgeon: Ajay Piedra MD;  [facility]: [facility];  Service: Urology    NJ THORACOSCOPY W/LOBECTOMY SINGLE LOBE Right 9/1/2023    Procedure: robotic assisted  converted to open right upper lobectomy, lymph node dissection;  Surgeon: Kalia Sparrow MD;  [facility];  Service: Thoracic    PROSTATECTOMY  2001    THORACOTOMY Right 2023    Procedure: right thoracotomy with Cryo-Ablation;  Surgeon: Kalia Sparrow MD;  [facility]: [facility];  Service: Thoracic    TONSILLECTOMY       Family History   Problem Relation Age of Onset    Dementia Mother             Breast cancer Sister             Prostate cancer Brother         Received Radiation     Social History     Socioeconomic History    Marital status: /Civil Union     Spouse name: Not on file    Number of children: Not on file    Years of education: Not on file    Highest education level: Not on file   Occupational History    Not on file   Tobacco Use    Smoking status: Former     Current packs/day: 0.00     Average packs/day: 1 pack/day for 15.0 years (15.0 ttl pk-yrs)     Types: Cigarettes     Start date:      Quit date:      Years since quittin.2     Passive exposure: Never    Smokeless tobacco: Never    Tobacco comments:     i quit when i was 28. not sure of exact dates   Vaping Use    Vaping status: Never Used   Substance and Sexual Activity    Alcohol use: Not Currently     Alcohol/week: 6.0 standard drinks of alcohol     Types: 6 Cans of beer per week     Comment: socially    Drug use: Not Currently     Types: Marijuana     Comment: gummies foro nausea with chemo    Sexual activity: Yes     Partners: Female     Birth control/protection: None   Other Topics Concern    Not on file   Social History Narrative    Not on file     Social Drivers of Health     Financial Resource Strain: Not on file   Food Insecurity: No Food Insecurity (2024)    Nursing - Inadequate Food Risk Classification     Worried About Running Out of Food in the Last Year: Never true     Ran Out of Food in the Last Year: Never true     Ran Out of Food in the Last Year: Not on file    Transportation Needs: No Transportation Needs (6/21/2024)    OASIS : Transportation     Lack of Transportation (Medical): No     Lack of Transportation (Non-Medical): No     Patient Unable or Declines to Respond: No   Physical Activity: Not on file   Stress: Not on file   Social Connections: Not on file   Intimate Partner Violence: Not on file   Housing Stability: Low Risk  (5/24/2024)    Housing Stability Vital Sign     Unable to Pay for Housing in the Last Year: No     Number of Times Moved in the Last Year: 1     Homeless in the Last Year: No       Current Outpatient Medications:     acetaminophen (TYLENOL) 325 mg tablet, Take 2 tablets (650 mg total) by mouth every 6 (six) hours as needed for mild pain or fever, Disp: , Rfl:     amLODIPine (NORVASC) 10 mg tablet, Take 1 tablet (10 mg total) by mouth daily, Disp: 30 tablet, Rfl: 5    atorvastatin (LIPITOR) 40 mg tablet, Take 1 tablet (40 mg total) by mouth daily after dinner, Disp: 30 tablet, Rfl: 5    dexamethasone (DECADRON) 4 mg tablet, Take 2 tablets (8 mg total) by mouth 2 (two) times a day with meals Day before, Day of, and Day after chemotherapy., Disp: 12 tablet, Rfl: 5    lacosamide (VIMPAT) 200 mg tablet, Take 1 tablet (200 mg total) by mouth every 12 (twelve) hours, Disp: 60 tablet, Rfl: 5    levETIRAcetam (Keppra) 1000 MG tablet, Take 2 tablets (2,000 mg total) by mouth 2 (two) times a day, Disp: 120 tablet, Rfl: 11    loperamide (IMODIUM A-D) 2 MG tablet, Take 2 mg by mouth 4 (four) times a day as needed for diarrhea. Indications: Diarrhea caused by Chemotherapy, Disp: , Rfl:     losartan (COZAAR) 50 mg tablet, Take 1 tablet (50 mg total) by mouth daily, Disp: 30 tablet, Rfl: 5  No current facility-administered medications for this visit.  No Known Allergies  There were no vitals filed for this visit.

## 2025-03-26 ENCOUNTER — HOSPITAL ENCOUNTER (OUTPATIENT)
Dept: INFUSION CENTER | Facility: HOSPITAL | Age: 70
Discharge: HOME | End: 2025-03-26
Attending: INTERNAL MEDICINE
Payer: COMMERCIAL

## 2025-03-26 VITALS — TEMPERATURE: 96.8 F

## 2025-03-26 DIAGNOSIS — Z45.2 ENCOUNTER FOR CENTRAL LINE CARE: ICD-10-CM

## 2025-03-26 DIAGNOSIS — C34.91 CARCINOMA OF RIGHT LUNG (HCC): ICD-10-CM

## 2025-03-26 DIAGNOSIS — C79.9 METASTATIC ADENOCARCINOMA (HCC): Primary | ICD-10-CM

## 2025-03-26 LAB
ALBUMIN SERPL BCG-MCNC: 4.5 G/DL (ref 3.5–5)
ALP SERPL-CCNC: 73 U/L (ref 34–104)
ALT SERPL W P-5'-P-CCNC: 16 U/L (ref 7–52)
ANION GAP SERPL CALCULATED.3IONS-SCNC: 6 MMOL/L (ref 4–13)
AST SERPL W P-5'-P-CCNC: 21 U/L (ref 13–39)
BASOPHILS # BLD AUTO: 0.11 THOUSANDS/ÂΜL (ref 0–0.1)
BASOPHILS NFR BLD AUTO: 2 % (ref 0–1)
BILIRUB SERPL-MCNC: 0.55 MG/DL (ref 0.2–1)
BUN SERPL-MCNC: 15 MG/DL (ref 5–25)
CALCIUM SERPL-MCNC: 8.9 MG/DL (ref 8.4–10.2)
CHLORIDE SERPL-SCNC: 107 MMOL/L (ref 96–108)
CO2 SERPL-SCNC: 29 MMOL/L (ref 21–32)
CREAT SERPL-MCNC: 0.75 MG/DL (ref 0.6–1.3)
EOSINOPHIL # BLD AUTO: 0.03 THOUSAND/ÂΜL (ref 0–0.61)
EOSINOPHIL NFR BLD AUTO: 1 % (ref 0–6)
ERYTHROCYTE [DISTWIDTH] IN BLOOD BY AUTOMATED COUNT: 14.7 % (ref 11.6–15.1)
GFR SERPL CREATININE-BSD FRML MDRD: 92 ML/MIN/1.73SQ M
GLUCOSE SERPL-MCNC: 117 MG/DL (ref 65–140)
HCT VFR BLD AUTO: 39.9 % (ref 36.5–49.3)
HGB BLD-MCNC: 12.8 G/DL (ref 12–17)
IMM GRANULOCYTES # BLD AUTO: 0.04 THOUSAND/UL (ref 0–0.2)
IMM GRANULOCYTES NFR BLD AUTO: 1 % (ref 0–2)
LYMPHOCYTES # BLD AUTO: 1.13 THOUSANDS/ÂΜL (ref 0.6–4.47)
LYMPHOCYTES NFR BLD AUTO: 18 % (ref 14–44)
MAGNESIUM SERPL-MCNC: 1.9 MG/DL (ref 1.9–2.7)
MCH RBC QN AUTO: 30 PG (ref 26.8–34.3)
MCHC RBC AUTO-ENTMCNC: 32.1 G/DL (ref 31.4–37.4)
MCV RBC AUTO: 93 FL (ref 82–98)
MONOCYTES # BLD AUTO: 0.58 THOUSAND/ÂΜL (ref 0.17–1.22)
MONOCYTES NFR BLD AUTO: 9 % (ref 4–12)
NEUTROPHILS # BLD AUTO: 4.55 THOUSANDS/ÂΜL (ref 1.85–7.62)
NEUTS SEG NFR BLD AUTO: 69 % (ref 43–75)
NRBC BLD AUTO-RTO: 0 /100 WBCS
PLATELET # BLD AUTO: 226 THOUSANDS/UL (ref 149–390)
PMV BLD AUTO: 9.7 FL (ref 8.9–12.7)
POTASSIUM SERPL-SCNC: 3.8 MMOL/L (ref 3.5–5.3)
PROT SERPL-MCNC: 6.9 G/DL (ref 6.4–8.4)
RBC # BLD AUTO: 4.27 MILLION/UL (ref 3.88–5.62)
SODIUM SERPL-SCNC: 142 MMOL/L (ref 135–147)
WBC # BLD AUTO: 6.44 THOUSAND/UL (ref 4.31–10.16)

## 2025-03-26 PROCEDURE — 80053 COMPREHEN METABOLIC PANEL: CPT

## 2025-03-26 PROCEDURE — 85025 COMPLETE CBC W/AUTO DIFF WBC: CPT

## 2025-03-26 PROCEDURE — 83735 ASSAY OF MAGNESIUM: CPT

## 2025-03-26 NOTE — PROGRESS NOTES
Port flushed freely.  Good blood return noted.  Central labs drawn per orders.  Port deaccessed without issue.  Patient tolerated well.      Paras Pitts is aware of future appt on 3/28/25 at 1100.     AVS printed and given to Paras Pitts:  Yes.      Patient ambulated off unit without incident.  All personal belongings taken with patient.

## 2025-03-26 NOTE — PLAN OF CARE
Problem: Potential for Falls  Goal: Patient will remain free of falls  Description: INTERVENTIONS:- Educate patient/family on patient safety including physical limitations- Instruct patient to call for assistance with activity - Consult OT/PT to assist with strengthening/mobility - Keep Call bell within reach- Keep bed low and locked with side rails adjusted as appropriate- Keep care items and personal belongings within reach- Initiate and maintain comfort rounds- Make Fall Risk Sign visible to staffOutcome: Progressing

## 2025-03-28 ENCOUNTER — HOSPITAL ENCOUNTER (OUTPATIENT)
Dept: INFUSION CENTER | Facility: HOSPITAL | Age: 70
End: 2025-03-28
Attending: INTERNAL MEDICINE
Payer: COMMERCIAL

## 2025-03-28 VITALS
HEIGHT: 69 IN | DIASTOLIC BLOOD PRESSURE: 84 MMHG | TEMPERATURE: 97.4 F | OXYGEN SATURATION: 98 % | HEART RATE: 80 BPM | RESPIRATION RATE: 18 BRPM | SYSTOLIC BLOOD PRESSURE: 139 MMHG | WEIGHT: 196.65 LBS | BODY MASS INDEX: 29.13 KG/M2

## 2025-03-28 DIAGNOSIS — C34.91 CARCINOMA OF RIGHT LUNG (HCC): ICD-10-CM

## 2025-03-28 DIAGNOSIS — C79.9 METASTATIC ADENOCARCINOMA (HCC): Primary | ICD-10-CM

## 2025-03-28 DIAGNOSIS — T45.1X5A CHEMOTHERAPY-INDUCED NEUTROPENIA (HCC): ICD-10-CM

## 2025-03-28 DIAGNOSIS — D70.1 CHEMOTHERAPY-INDUCED NEUTROPENIA (HCC): ICD-10-CM

## 2025-03-28 PROCEDURE — 96377 APPLICATON ON-BODY INJECTOR: CPT

## 2025-03-28 PROCEDURE — 96367 TX/PROPH/DG ADDL SEQ IV INF: CPT

## 2025-03-28 PROCEDURE — 96413 CHEMO IV INFUSION 1 HR: CPT

## 2025-03-28 RX ORDER — SODIUM CHLORIDE 9 MG/ML
20 INJECTION, SOLUTION INTRAVENOUS ONCE
Status: COMPLETED | OUTPATIENT
Start: 2025-03-28 | End: 2025-03-28

## 2025-03-28 RX ADMIN — DOCETAXEL 124.2 MG: 20 INJECTION, SOLUTION, CONCENTRATE INTRAVENOUS at 11:45

## 2025-03-28 RX ADMIN — SODIUM CHLORIDE 20 ML/HR: 9 INJECTION, SOLUTION INTRAVENOUS at 11:14

## 2025-03-28 RX ADMIN — PEGFILGRASTIM 6 MG: KIT SUBCUTANEOUS at 12:57

## 2025-03-28 RX ADMIN — DEXAMETHASONE SODIUM PHOSPHATE: 10 INJECTION, SOLUTION INTRAMUSCULAR; INTRAVENOUS at 11:12

## 2025-03-28 NOTE — PROGRESS NOTES
Paras Pitts  tolerated  taxotere treatment well with no complications.    Neulasta on pro applied to rt arm.   Paras Pitts is aware of future appt on 4/16/25    AVS printed and given to Paras Pitts:  Yes

## 2025-04-16 ENCOUNTER — HOSPITAL ENCOUNTER (OUTPATIENT)
Dept: INFUSION CENTER | Facility: HOSPITAL | Age: 70
Discharge: HOME | End: 2025-04-16
Payer: COMMERCIAL

## 2025-04-16 VITALS — TEMPERATURE: 96.8 F

## 2025-04-16 DIAGNOSIS — Z45.2 ENCOUNTER FOR CENTRAL LINE CARE: ICD-10-CM

## 2025-04-16 DIAGNOSIS — C34.91 CARCINOMA OF RIGHT LUNG (HCC): ICD-10-CM

## 2025-04-16 DIAGNOSIS — C79.9 METASTATIC ADENOCARCINOMA (HCC): Primary | ICD-10-CM

## 2025-04-16 LAB
ALBUMIN SERPL BCG-MCNC: 4.3 G/DL (ref 3.5–5)
ALP SERPL-CCNC: 71 U/L (ref 34–104)
ALT SERPL W P-5'-P-CCNC: 15 U/L (ref 7–52)
ANION GAP SERPL CALCULATED.3IONS-SCNC: 8 MMOL/L (ref 4–13)
AST SERPL W P-5'-P-CCNC: 23 U/L (ref 13–39)
BASOPHILS # BLD AUTO: 0.11 THOUSANDS/ÂΜL (ref 0–0.1)
BASOPHILS NFR BLD AUTO: 1 % (ref 0–1)
BILIRUB SERPL-MCNC: 0.61 MG/DL (ref 0.2–1)
BUN SERPL-MCNC: 11 MG/DL (ref 5–25)
CALCIUM SERPL-MCNC: 9.1 MG/DL (ref 8.4–10.2)
CHLORIDE SERPL-SCNC: 106 MMOL/L (ref 96–108)
CO2 SERPL-SCNC: 29 MMOL/L (ref 21–32)
CREAT SERPL-MCNC: 0.76 MG/DL (ref 0.6–1.3)
EOSINOPHIL # BLD AUTO: 0.06 THOUSAND/ÂΜL (ref 0–0.61)
EOSINOPHIL NFR BLD AUTO: 1 % (ref 0–6)
ERYTHROCYTE [DISTWIDTH] IN BLOOD BY AUTOMATED COUNT: 15 % (ref 11.6–15.1)
GFR SERPL CREATININE-BSD FRML MDRD: 91 ML/MIN/1.73SQ M
GLUCOSE SERPL-MCNC: 123 MG/DL (ref 65–140)
HCT VFR BLD AUTO: 39.8 % (ref 36.5–49.3)
HGB BLD-MCNC: 12.6 G/DL (ref 12–17)
IMM GRANULOCYTES # BLD AUTO: 0.06 THOUSAND/UL (ref 0–0.2)
IMM GRANULOCYTES NFR BLD AUTO: 1 % (ref 0–2)
LYMPHOCYTES # BLD AUTO: 1.22 THOUSANDS/ÂΜL (ref 0.6–4.47)
LYMPHOCYTES NFR BLD AUTO: 14 % (ref 14–44)
MAGNESIUM SERPL-MCNC: 1.9 MG/DL (ref 1.9–2.7)
MCH RBC QN AUTO: 30.1 PG (ref 26.8–34.3)
MCHC RBC AUTO-ENTMCNC: 31.7 G/DL (ref 31.4–37.4)
MCV RBC AUTO: 95 FL (ref 82–98)
MONOCYTES # BLD AUTO: 0.51 THOUSAND/ÂΜL (ref 0.17–1.22)
MONOCYTES NFR BLD AUTO: 6 % (ref 4–12)
NEUTROPHILS # BLD AUTO: 7.1 THOUSANDS/ÂΜL (ref 1.85–7.62)
NEUTS SEG NFR BLD AUTO: 77 % (ref 43–75)
NRBC BLD AUTO-RTO: 0 /100 WBCS
PLATELET # BLD AUTO: 290 THOUSANDS/UL (ref 149–390)
PMV BLD AUTO: 9.7 FL (ref 8.9–12.7)
POTASSIUM SERPL-SCNC: 4.1 MMOL/L (ref 3.5–5.3)
PROT SERPL-MCNC: 7 G/DL (ref 6.4–8.4)
RBC # BLD AUTO: 4.19 MILLION/UL (ref 3.88–5.62)
SODIUM SERPL-SCNC: 143 MMOL/L (ref 135–147)
WBC # BLD AUTO: 9.06 THOUSAND/UL (ref 4.31–10.16)

## 2025-04-16 PROCEDURE — 83735 ASSAY OF MAGNESIUM: CPT

## 2025-04-16 PROCEDURE — 80053 COMPREHEN METABOLIC PANEL: CPT

## 2025-04-16 PROCEDURE — 85025 COMPLETE CBC W/AUTO DIFF WBC: CPT

## 2025-04-16 NOTE — PROGRESS NOTES
Paras Pitts  tolerated lab draw well with no complications.      Paras Pitts is aware of future appt on 4/18 at 10AM.     AVS printed and given to Paras Pitts: No (Declined by Paras Pitts).

## 2025-04-18 ENCOUNTER — HOSPITAL ENCOUNTER (OUTPATIENT)
Dept: INFUSION CENTER | Facility: HOSPITAL | Age: 70
End: 2025-04-18
Attending: INTERNAL MEDICINE
Payer: COMMERCIAL

## 2025-04-18 VITALS
BODY MASS INDEX: 28.73 KG/M2 | SYSTOLIC BLOOD PRESSURE: 140 MMHG | OXYGEN SATURATION: 96 % | HEART RATE: 83 BPM | RESPIRATION RATE: 18 BRPM | WEIGHT: 194 LBS | DIASTOLIC BLOOD PRESSURE: 74 MMHG | HEIGHT: 69 IN | TEMPERATURE: 96.9 F

## 2025-04-18 DIAGNOSIS — C79.9 METASTATIC ADENOCARCINOMA (HCC): Primary | ICD-10-CM

## 2025-04-18 DIAGNOSIS — T45.1X5A CHEMOTHERAPY-INDUCED NEUTROPENIA (HCC): ICD-10-CM

## 2025-04-18 DIAGNOSIS — C34.91 CARCINOMA OF RIGHT LUNG (HCC): ICD-10-CM

## 2025-04-18 DIAGNOSIS — D70.1 CHEMOTHERAPY-INDUCED NEUTROPENIA (HCC): ICD-10-CM

## 2025-04-18 PROCEDURE — 96377 APPLICATON ON-BODY INJECTOR: CPT

## 2025-04-18 PROCEDURE — 96413 CHEMO IV INFUSION 1 HR: CPT

## 2025-04-18 PROCEDURE — 96367 TX/PROPH/DG ADDL SEQ IV INF: CPT

## 2025-04-18 RX ORDER — SODIUM CHLORIDE 9 MG/ML
20 INJECTION, SOLUTION INTRAVENOUS ONCE
Status: COMPLETED | OUTPATIENT
Start: 2025-04-18 | End: 2025-04-18

## 2025-04-18 RX ADMIN — SODIUM CHLORIDE 20 ML/HR: 0.9 INJECTION, SOLUTION INTRAVENOUS at 10:13

## 2025-04-18 RX ADMIN — DOCETAXEL 124.2 MG: 20 INJECTION, SOLUTION, CONCENTRATE INTRAVENOUS at 11:00

## 2025-04-18 RX ADMIN — DEXAMETHASONE SODIUM PHOSPHATE: 10 INJECTION, SOLUTION INTRAMUSCULAR; INTRAVENOUS at 10:13

## 2025-04-18 RX ADMIN — PEGFILGRASTIM 6 MG: KIT SUBCUTANEOUS at 12:03

## 2025-04-18 NOTE — PROGRESS NOTES
Paras Pitts  tolerated taxotere infusion well with no complications. Neulasta OnPro applied to left arm - full and blinking green upon discharge. Pt aware to remove device tomorrow after 5PM when injection is complete.    Paras Pitts is aware of future appt on 5/7 at 11:30AM.     AVS printed and given to Paras Pitts.

## 2025-04-21 ENCOUNTER — OFFICE VISIT (OUTPATIENT)
Dept: FAMILY MEDICINE CLINIC | Facility: CLINIC | Age: 70
End: 2025-04-21
Payer: COMMERCIAL

## 2025-04-21 VITALS
HEIGHT: 69 IN | OXYGEN SATURATION: 97 % | HEART RATE: 81 BPM | WEIGHT: 198 LBS | BODY MASS INDEX: 29.33 KG/M2 | SYSTOLIC BLOOD PRESSURE: 128 MMHG | DIASTOLIC BLOOD PRESSURE: 70 MMHG

## 2025-04-21 DIAGNOSIS — E55.9 VITAMIN D DEFICIENCY: ICD-10-CM

## 2025-04-21 DIAGNOSIS — I10 ESSENTIAL HYPERTENSION: ICD-10-CM

## 2025-04-21 DIAGNOSIS — G81.94 LEFT HEMIPARESIS (HCC): ICD-10-CM

## 2025-04-21 DIAGNOSIS — H90.0 CONDUCTIVE HEARING LOSS, BILATERAL: ICD-10-CM

## 2025-04-21 DIAGNOSIS — G40.909 SEIZURE DISORDER (HCC): ICD-10-CM

## 2025-04-21 DIAGNOSIS — Z99.89 AMBULATES WITH CANE: ICD-10-CM

## 2025-04-21 DIAGNOSIS — C34.91 CARCINOMA OF RIGHT LUNG (HCC): Primary | ICD-10-CM

## 2025-04-21 PROCEDURE — 99214 OFFICE O/P EST MOD 30 MIN: CPT | Performed by: STUDENT IN AN ORGANIZED HEALTH CARE EDUCATION/TRAINING PROGRAM

## 2025-04-21 RX ORDER — AMOXICILLIN 500 MG/1
CAPSULE ORAL
COMMUNITY
Start: 2025-04-17

## 2025-04-21 NOTE — ASSESSMENT & PLAN NOTE
Follows closely with pulm/heme  Currently receiving chemo just had treatment #3  Reports fatigue, myalgias   Now on high dose steroids as well

## 2025-04-21 NOTE — PROGRESS NOTES
"Name: Paras Pitts      : 1955      MRN: [id number]  Encounter Provider: Addy Craven MD  Encounter Date: 2025   Encounter department: [facility]  :  Assessment & Plan  Carcinoma of right lung (HCC)  Follows closely with pulm/heme  Currently receiving chemo just had treatment #3  Reports fatigue, myalgias   Now on high dose steroids as well          Essential hypertension  Vitals reviewed including trends  BP remains controlled at this time  Patient reports no side effects from current medication regiment  Plan to continue current medication and dosing   Discussed lifestyle modifications that could be beneficial including low salt diet, increase physical activity and weight lose   Continue with interval labs as indicated            Seizure disorder (HCC)  Follows with neuro   On vimpat and keppra  Will need to monitor closely given his brain mets and ongoing chemo    S.o reports possible 1 months that was extremely short acting, neuro aware and changed dose following this          Left hemiparesis (HCC)         Ambulates with cane         Vitamin D deficiency  Patient requesting Vit D level     Orders:    Vitamin D 25 hydroxy; Future    Conductive hearing loss, bilateral  Reoports b/l hearting lose  Would like a hearing test and to be evaluated for hearing aids     Orders:    Ambulatory Referral to Audiology; Future           History of Present Illness   HPI      This is a 69 y.o. male who presents to the office for routine follow-up of chronic medical conditions.  Please see above \"LISA\" documentation for additional information      Review of Systems   Constitutional:  Negative for activity change, appetite change, chills, fatigue and fever.   HENT:  Negative for congestion, dental problem, drooling, ear discharge, ear pain, facial swelling, postnasal drip, rhinorrhea and sinus pain.    Eyes:  Negative for photophobia, pain, discharge and itching.   Respiratory:  Negative for " "apnea, cough, chest tightness and shortness of breath.    Cardiovascular:  Negative for chest pain and leg swelling.   Gastrointestinal:  Negative for abdominal distention, abdominal pain, anal bleeding, constipation, diarrhea and nausea.   Endocrine: Negative for cold intolerance, heat intolerance and polydipsia.   Genitourinary:  Negative for difficulty urinating.   Musculoskeletal:  Positive for gait problem. Negative for arthralgias, joint swelling and myalgias.   Skin:  Negative for color change and pallor.   Allergic/Immunologic: Negative for immunocompromised state.   Neurological:  Negative for dizziness, seizures, facial asymmetry, weakness, light-headedness, numbness and headaches.   Psychiatric/Behavioral:  Negative for agitation, behavioral problems, confusion, decreased concentration and dysphoric mood.    All other systems reviewed and are negative.      Objective   /70 (BP Location: Left arm, Patient Position: Sitting, Cuff Size: Extra-Large)   Pulse 81   Ht 5' 9\" (1.753 m)   Wt 89.8 kg (198 lb)   SpO2 97%   BMI 29.24 kg/m²      Physical Exam  Constitutional:       Appearance: He is well-developed.   HENT:      Head: Normocephalic.   Eyes:      Pupils: Pupils are equal, round, and reactive to light.   Cardiovascular:      Rate and Rhythm: Normal rate and regular rhythm.   Pulmonary:      Effort: Pulmonary effort is normal.      Breath sounds: Normal breath sounds.   Abdominal:      General: Bowel sounds are normal.      Palpations: Abdomen is soft.   Musculoskeletal:         General: Normal range of motion.      Cervical back: Normal range of motion and neck supple.   Skin:     General: Skin is warm.   Neurological:      Gait: Gait abnormal.         "

## 2025-04-21 NOTE — ASSESSMENT & PLAN NOTE
Follows with neuro   On vimpat and keppra  Will need to monitor closely given his brain mets and ongoing chemo    S.o reports possible 1 months that was extremely short acting, neuro aware and changed dose following this

## 2025-04-28 ENCOUNTER — HOSPITAL ENCOUNTER (OUTPATIENT)
Dept: CT IMAGING | Facility: HOSPITAL | Age: 70
Discharge: HOME | End: 2025-04-28
Attending: INTERNAL MEDICINE
Payer: COMMERCIAL

## 2025-04-28 DIAGNOSIS — C34.91 CARCINOMA OF RIGHT LUNG (HCC): ICD-10-CM

## 2025-04-28 PROCEDURE — 71260 CT THORAX DX C+: CPT

## 2025-04-28 PROCEDURE — 74177 CT ABD & PELVIS W/CONTRAST: CPT

## 2025-04-28 RX ADMIN — IOHEXOL 100 ML: 350 INJECTION, SOLUTION INTRAVENOUS at 09:21

## 2025-04-30 ENCOUNTER — OFFICE VISIT (OUTPATIENT)
Dept: NEUROLOGY | Facility: CLINIC | Age: 70
End: 2025-04-30
Payer: COMMERCIAL

## 2025-04-30 VITALS
BODY MASS INDEX: 28.58 KG/M2 | SYSTOLIC BLOOD PRESSURE: 136 MMHG | OXYGEN SATURATION: 98 % | HEART RATE: 76 BPM | WEIGHT: 193 LBS | DIASTOLIC BLOOD PRESSURE: 76 MMHG | HEIGHT: 69 IN

## 2025-04-30 DIAGNOSIS — G40.109 FOCAL EPILEPSY ORIGINATING IN FRONTAL LOBE (HCC): ICD-10-CM

## 2025-04-30 PROCEDURE — 99215 OFFICE O/P EST HI 40 MIN: CPT | Performed by: STUDENT IN AN ORGANIZED HEALTH CARE EDUCATION/TRAINING PROGRAM

## 2025-04-30 RX ORDER — LACOSAMIDE 200 MG/1
200 TABLET ORAL EVERY 12 HOURS SCHEDULED
Qty: 60 TABLET | Refills: 5 | Status: SHIPPED | OUTPATIENT
Start: 2025-04-30 | End: 2025-10-27

## 2025-04-30 NOTE — PROGRESS NOTES
"Epilepsy Ambulatory Visit  Name: Paras Pitts       : 1955       MRN: [id number]   Encounter Provider: Addie Martínez MD   Encounter Date: 2025  Encounter department: [facility]    Paras Pitts is a right-handed 69 y.o. male with lung adenocarcinoma and right frontal metastasis s/p resection, c/b focal status epilepticus in April, who presents for follow-up of Epilepsy. He had one breakthrough seizure in February, so we increased his levetiracetam dosage.     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 At this point, he increased the levetiracetam dosage to 2000mg twice a day. He has not had anything since then.     His medication makes him sleepy. He does take naps every day. But that might be the chemotherapy. He is worried that his chemotherapy is not working.     He denies any falls or imbalance.       Epilepsy Risk Factors: Patient is a product of uncomplicated pregnancy, full term spontaneous vaginal delivery, met appropriate development milestones. No learning disabilities or cognitive delay. No h/o febrile seizures. Stroke and CNS tumor as above.  There is no family h/o of seizures or epilepsy.     Prior Work-up:   MRI Brain (24): Personally reviewed by me, and shows: Right superior frontal " enhancing lesion with surrounding vasogenic edema, close to motor strip.   IMPRESSION: Right superior frontal mass resection and chemoradiation with unchanged linear enhancement along the surgical cavity compared to 3/26/2024 favoring radiation necrosis. Residual/recurrent disease is felt to be less likely. Stable surrounding hyperintense. T2/FLAIR signal likely related to a combination of posttreatment changes and resolving vasogenic edema. There is no new suspicious intra-axial lesion.  VEEGs: 4/22-4/23/24  Summary of monitoring: This concludes 37 hours of continuous video EEG monitoring from 4/22/2024 22:27 through 4/24/2024 1133. Early in recording there was frequent or nearly continuous focal motor seizure (periodic central midline discharges consistently correlating with left leg jerks). Focal motor seizure stopped near 4/22 at 10:00 (periodic discharges remained unchanged, but had no clinical correlate). There were two subclinical right fronto central electrographic seizures (4/23 at 13:12, 19:21).   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 His head and face weren't involved. A customer walked in and called the ambulance because he was having difficulty breathing. The shaking may have been over 30 minutes. It would come and go. He never lost consciousness or awareness.      In the hospital, he was found to be in focal status epilepticus of the left hemibody and was started on Keppra, Onfi, and Vimpat with resolution of seizures. He subsequently have breakthrough seizures because he had run out of his medication.       Past Medical History:    Past Medical History:   Diagnosis Date    Brain compression (HCC) 03/20/2023    Brain mass 03/20/2023    Cancer (HCC)  2001    Receint lung and brain lesions and prostate cancer in     Cerebral edema (HCC) 2023    Hypertension     Lung cancer (HCC)     Prostate cancer (HCC)     Rectal bleeding     Stroke (HCC)            Past Surgical History:   Procedure Laterality Date    COLONOSCOPY      CRANIOTOMY Right 3/23/2023    Procedure: Right frontal CRANIOTOMY IMAGE-GUIDED FOR TUMOR;  Surgeon: Clark Carrillo MD;  [facility]: [facility];  Service: Neurosurgery    ENDOBRONCHIAL ULTRASOUND (EBUS) N/A 2024    Procedure: ENDOBRONCHIAL ULTRASOUND (EBUS);  Surgeon: Kalia Sparrow MD;  [facility];  Service: Thoracic    IR PORT PLACEMENT  2023    MD BRNCC INCL FLUOR GDNCE DX W/CELL WASHG SPX N/A 2023    Procedure: BRONCHOSCOPY FLEXIBLE;  Surgeon: Kalia Sparrow MD;  [facility];  Service: Thoracic    MD BRNCHSC INCL FLUOR GDNCE DX W/CELL WASHG SPX N/A 2024    Procedure: BRONCHOSCOPY FLEXIBLE;  Surgeon: Kalia Sparrow MD;  [facility];  Service: Thoracic    MD CYSTOURETHROSCOPY N/A 3/23/2023    Procedure: EUA, DEL CASTILLO INSERTION;  Surgeon: Ajay Piedra MD;  [facility];  Service: Urology    MD THORACOSCOPY W/LOBECTOMY SINGLE LOBE Right 2023    Procedure: robotic assisted converted to open right upper lobectomy, lymph node dissection;  Surgeon: Kalia Sparrow MD;  [facility];  Service: Thoracic    PROSTATECTOMY  2001    THORACOTOMY Right 2023    Procedure: right thoracotomy with Cryo-Ablation;  Surgeon: Kalia Sparrow MD;  [facility];  Service: Thoracic    TONSILLECTOMY         Family History:  Family History   Problem Relation Age of Onset    Dementia Mother             Breast cancer Sister             Prostate cancer Brother         Received Radiation       Social History:  Social History     Tobacco Use    Smoking status: Former     Current packs/day: 0.00     Average packs/day: 1 pack/day for 15.0 years (15.0  ttl pk-yrs)     Types: Cigarettes     Start date:      Quit date:      Years since quittin.3     Passive exposure: Never    Smokeless tobacco: Never    Tobacco comments:     i quit when i was 28. not sure of exact dates   Vaping Use    Vaping status: Never Used   Substance Use Topics    Alcohol use: Not Currently     Alcohol/week: 6.0 standard drinks of alcohol     Types: 6 Cans of beer per week     Comment: socially    Drug use: Yes     Types: Marijuana     Comment: gummies for nausea with chemo      Allergies:  No Known Allergies    Medications:    Current Outpatient Medications:     acetaminophen (TYLENOL) 325 mg tablet, Take 2 tablets (650 mg total) by mouth every 6 (six) hours as needed for mild pain or fever, Disp: , Rfl:     amLODIPine (NORVASC) 10 mg tablet, Take 1 tablet (10 mg total) by mouth daily, Disp: 30 tablet, Rfl: 5    amoxicillin (AMOXIL) 500 mg capsule, , Disp: , Rfl:     atorvastatin (LIPITOR) 40 mg tablet, Take 1 tablet (40 mg total) by mouth daily after dinner, Disp: 30 tablet, Rfl: 5    BOSWELLIA DANYA PO, Take 4 g by mouth in the morning, Disp: , Rfl:     dexamethasone (DECADRON) 4 mg tablet, Take 2 tablets (8 mg total) by mouth 2 (two) times a day with meals Day before, Day of, and Day after chemotherapy., Disp: 12 tablet, Rfl: 5    lacosamide (VIMPAT) 200 mg tablet, Take 1 tablet (200 mg total) by mouth every 12 (twelve) hours, Disp: 60 tablet, Rfl: 5    levETIRAcetam (Keppra) 1000 MG tablet, Take 2 tablets (2,000 mg total) by mouth 2 (two) times a day, Disp: 120 tablet, Rfl: 11    loperamide (IMODIUM A-D) 2 MG tablet, Take 2 mg by mouth 4 (four) times a day as needed for diarrhea. Indications: Diarrhea caused by Chemotherapy, Disp: , Rfl:     losartan (COZAAR) 50 mg tablet, Take 1 tablet (50 mg total) by mouth daily, Disp: 30 tablet, Rfl: 5    Turmeric (QC TUMERIC COMPLEX PO), Take by mouth in the morning, Disp: , Rfl:       OBJECTIVE  /76 (BP Location: Left arm,  "Patient Position: Sitting, Cuff Size: Adult)   Pulse 76   Ht 5' 9\" (1.753 m)   Wt 87.5 kg (193 lb)   SpO2 98%   BMI 28.50 kg/m²      Labs  I have reviewed pertinent labs:  CBC:   Lab Results   Component Value Date    WBC 9.06 04/16/2025    RBC 4.19 04/16/2025    HGB 12.6 04/16/2025    HCT 39.8 04/16/2025    MCV 95 04/16/2025     04/16/2025    MCH 30.1 04/16/2025    MCHC 31.7 04/16/2025    RDW 15.0 04/16/2025    MPV 9.7 04/16/2025    NEUTROABS 7.10 04/16/2025     CMP:   Lab Results   Component Value Date    SODIUM 143 04/16/2025    K 4.1 04/16/2025     04/16/2025    CO2 29 04/16/2025    AGAP 8 04/16/2025    BUN 11 04/16/2025    CREATININE 0.76 04/16/2025    GLUC 123 04/16/2025    GLUF 107 (H) 12/12/2024    CALCIUM 9.1 04/16/2025    AST 23 04/16/2025    ALT 15 04/16/2025    ALKPHOS 71 04/16/2025    TP 7.0 04/16/2025    ALB 4.3 04/16/2025    TBILI 0.61 04/16/2025    EGFR 91 04/16/2025       Lab Results   Component Value Date/Time    SYBXGVMX97 585 05/10/2018 01:39 PM    MOBL31YYJLNZ 15.20 05/10/2018 01:39 PM    TSH 3.73 09/12/2022 11:45 AM    FZR9BVAEFWRF 3.050 03/05/2025 12:03 PM    HRS9VRCOCARB 1.52 05/10/2018 01:39 PM    FREET4 0.82 02/22/2024 10:07 AM    FREET4 0.97 05/10/2018 01:39 PM    HGBA1C 6.1 (H) 12/12/2024 08:31 AM    CRP 9.0 (H) 07/19/2023 02:09 PM    ESR 36 (H) 07/19/2023 02:09 PM       Lab Results   Component Value Date/Time    LEVETIRACETA 20.1 02/13/2025 08:17 AM    LACOSAMIDE 10.60 02/13/2025 08:17 AM         General Exam  GENERAL APPEARANCE:  No distress, alert, interactive and cooperative.  CARDIOVASCULAR: Warm and well perfused  LUNGS: normal work of breathing on room air  EXTREMITIES: no peripheral edema     Neurologic Exam  Mental Status: Alert and oriented to person, place, and time.   Language: Fluent, comprehension intact.  Cranial Nerves: EOMI with no nystagmus. Face is symmetric. No dysarthria. Hearing is intact to conversation.    Motor:  Antigravity in all extremities. "      L R  Deltoid:   5 5-  Biceps:   5 5  Triceps:   5 5-  Wrist extension: 5 5  Hand :  5 5  Hip extension:  5 4+  Knee extension: 5 5-  Knee flexion:  5 5  Dorsiflexion:  5 5  Plantarflexion:  5 5  Gait: Normal gait.     Administrative Statements   The total amount of time spent with the patient and on chart review and documentation was 44 minutes. Issues addressed during this visit included counseling on diagnosis and prognosis, counseling on medical management, and discussion of exam findings.

## 2025-05-02 RX ORDER — SODIUM CHLORIDE 9 MG/ML
20 INJECTION, SOLUTION INTRAVENOUS ONCE
OUTPATIENT
Start: 2025-05-09

## 2025-05-07 ENCOUNTER — HOSPITAL ENCOUNTER (OUTPATIENT)
Dept: INFUSION CENTER | Facility: HOSPITAL | Age: 70
Discharge: HOME | End: 2025-05-07
Attending: INTERNAL MEDICINE
Payer: COMMERCIAL

## 2025-05-07 VITALS — TEMPERATURE: 97.2 F

## 2025-05-07 DIAGNOSIS — C34.91 CARCINOMA OF RIGHT LUNG (HCC): ICD-10-CM

## 2025-05-07 DIAGNOSIS — Z45.2 ENCOUNTER FOR CENTRAL LINE CARE: ICD-10-CM

## 2025-05-07 DIAGNOSIS — C79.9 METASTATIC ADENOCARCINOMA (HCC): Primary | ICD-10-CM

## 2025-05-07 LAB
ALBUMIN SERPL BCG-MCNC: 4.4 G/DL (ref 3.5–5)
ALP SERPL-CCNC: 73 U/L (ref 34–104)
ALT SERPL W P-5'-P-CCNC: 16 U/L (ref 7–52)
ANION GAP SERPL CALCULATED.3IONS-SCNC: 7 MMOL/L (ref 4–13)
AST SERPL W P-5'-P-CCNC: 21 U/L (ref 13–39)
BASOPHILS # BLD AUTO: 0.13 THOUSANDS/ÂΜL (ref 0–0.1)
BASOPHILS NFR BLD AUTO: 1 % (ref 0–1)
BILIRUB SERPL-MCNC: 0.64 MG/DL (ref 0.2–1)
BUN SERPL-MCNC: 14 MG/DL (ref 5–25)
CALCIUM SERPL-MCNC: 9.2 MG/DL (ref 8.4–10.2)
CHLORIDE SERPL-SCNC: 106 MMOL/L (ref 96–108)
CO2 SERPL-SCNC: 30 MMOL/L (ref 21–32)
CREAT SERPL-MCNC: 0.68 MG/DL (ref 0.6–1.3)
EOSINOPHIL # BLD AUTO: 0.02 THOUSAND/ÂΜL (ref 0–0.61)
EOSINOPHIL NFR BLD AUTO: 0 % (ref 0–6)
ERYTHROCYTE [DISTWIDTH] IN BLOOD BY AUTOMATED COUNT: 15.6 % (ref 11.6–15.1)
GFR SERPL CREATININE-BSD FRML MDRD: 96 ML/MIN/1.73SQ M
GLUCOSE SERPL-MCNC: 112 MG/DL (ref 65–140)
HCT VFR BLD AUTO: 39.3 % (ref 36.5–49.3)
HGB BLD-MCNC: 12.4 G/DL (ref 12–17)
IMM GRANULOCYTES # BLD AUTO: 0.09 THOUSAND/UL (ref 0–0.2)
IMM GRANULOCYTES NFR BLD AUTO: 1 % (ref 0–2)
LYMPHOCYTES # BLD AUTO: 1.16 THOUSANDS/ÂΜL (ref 0.6–4.47)
LYMPHOCYTES NFR BLD AUTO: 11 % (ref 14–44)
MAGNESIUM SERPL-MCNC: 1.9 MG/DL (ref 1.9–2.7)
MCH RBC QN AUTO: 30.6 PG (ref 26.8–34.3)
MCHC RBC AUTO-ENTMCNC: 31.6 G/DL (ref 31.4–37.4)
MCV RBC AUTO: 97 FL (ref 82–98)
MONOCYTES # BLD AUTO: 0.78 THOUSAND/ÂΜL (ref 0.17–1.22)
MONOCYTES NFR BLD AUTO: 7 % (ref 4–12)
NEUTROPHILS # BLD AUTO: 8.8 THOUSANDS/ÂΜL (ref 1.85–7.62)
NEUTS SEG NFR BLD AUTO: 80 % (ref 43–75)
NRBC BLD AUTO-RTO: 0 /100 WBCS
PLATELET # BLD AUTO: 220 THOUSANDS/UL (ref 149–390)
PMV BLD AUTO: 10.1 FL (ref 8.9–12.7)
POTASSIUM SERPL-SCNC: 4.1 MMOL/L (ref 3.5–5.3)
PROT SERPL-MCNC: 6.9 G/DL (ref 6.4–8.4)
RBC # BLD AUTO: 4.05 MILLION/UL (ref 3.88–5.62)
SODIUM SERPL-SCNC: 143 MMOL/L (ref 135–147)
WBC # BLD AUTO: 10.98 THOUSAND/UL (ref 4.31–10.16)

## 2025-05-07 PROCEDURE — 80053 COMPREHEN METABOLIC PANEL: CPT

## 2025-05-07 PROCEDURE — 85025 COMPLETE CBC W/AUTO DIFF WBC: CPT

## 2025-05-07 PROCEDURE — 83735 ASSAY OF MAGNESIUM: CPT

## 2025-05-07 NOTE — PROGRESS NOTES
Port flushed freely.  Good blood return noted.  Central labs drawn per orders.  Port deaccessed without issue.  Patient tolerated well.    Paras Pitts is aware of future appt on 5/9/25 at 1130.     AVS -  No (Declined by Paras Pitts) Patient has Mychart.    Patient ambulated off unit without incident.  All personal belongings taken with patient.

## 2025-05-08 ENCOUNTER — OFFICE VISIT (OUTPATIENT)
Dept: AUDIOLOGY | Facility: CLINIC | Age: 70
End: 2025-05-08
Payer: COMMERCIAL

## 2025-05-08 DIAGNOSIS — H90.3 SENSORY HEARING LOSS, BILATERAL: Primary | ICD-10-CM

## 2025-05-08 DIAGNOSIS — H90.0 CONDUCTIVE HEARING LOSS, BILATERAL: ICD-10-CM

## 2025-05-08 PROCEDURE — 92567 TYMPANOMETRY: CPT | Performed by: AUDIOLOGIST-HEARING AID FITTER

## 2025-05-08 PROCEDURE — 92557 COMPREHENSIVE HEARING TEST: CPT | Performed by: AUDIOLOGIST-HEARING AID FITTER

## 2025-05-08 NOTE — PROGRESS NOTES
Diagnostic Hearing Evaluation    Name:  Paras Pitts  :  1955  Age:  69 y.o.   MRN:  [id number]  Date of Evaluation: 25     HISTORY:     Reason for visit: Difficulty Understanding    Paras Pitts is being seen today at the request of Dr. Craven for an initial  evaluation of hearing. The patient reports  some trouble hearing . Denied tinnitus and ear pain. There is some aural fullness at times. There is a significant history of loud noise exposure.    EVALUATION:    Otoscopic Evaluation:   Right Ear: Non-occluding cerumen   Left Ear: Non-occluding cerumen    Tympanometry:   Right Ear: Type A; normal middle ear pressure and static compliance    Left Ear: Type A; normal middle ear pressure and static compliance     Speech Audiometry:  Speech Reception (SRT)    Right Ear: 40 dB HL    Left Ear: 45 dB HL    Word Recognition Scores (WRS):  Right Ear: excellent (100 % correct)     Left Ear: excellent (92 % correct)    Stimuli: [id number]    Pure Tone Audiometry:  Conventional pure tone audiometry from 250 - 8000 Hz  was obtained with good reliability and revealed the following:     Right Ear: Mild sloping to moderately severe sensorineural hearing loss (SNHL)    Left Ear: Mild sloping to moderately severe sensorineural hearing loss (SNHL)     *see attached audiogram    RECOMMENDATIONS:  Annual hearing eval, Return to [facility]. for F/U, and Hearing Aid Evaluation    PATIENT EDUCATION:   The results of today's results and recommendations were reviewed with the patient and his hearing thresholds were explained at length. Treatment options, including amplification and communication strategies, were discussed as appropriate. The patient voiced understanding of his test results. Questions were addressed and the patient was encouraged to contact our department should concerns arise.      Sampson Angela, CCC-A  Clinical Audiologist  [facility] AUDIOLOGY [address] [address]

## 2025-05-09 ENCOUNTER — HOSPITAL ENCOUNTER (OUTPATIENT)
Dept: INFUSION CENTER | Facility: HOSPITAL | Age: 70
End: 2025-05-09
Attending: INTERNAL MEDICINE
Payer: COMMERCIAL

## 2025-05-09 VITALS
HEART RATE: 82 BPM | RESPIRATION RATE: 17 BRPM | HEIGHT: 69 IN | BODY MASS INDEX: 29.26 KG/M2 | TEMPERATURE: 96.9 F | SYSTOLIC BLOOD PRESSURE: 137 MMHG | DIASTOLIC BLOOD PRESSURE: 82 MMHG | WEIGHT: 197.53 LBS

## 2025-05-09 DIAGNOSIS — T45.1X5A CHEMOTHERAPY-INDUCED NEUTROPENIA (HCC): ICD-10-CM

## 2025-05-09 DIAGNOSIS — C34.91 CARCINOMA OF RIGHT LUNG (HCC): ICD-10-CM

## 2025-05-09 DIAGNOSIS — D70.1 CHEMOTHERAPY-INDUCED NEUTROPENIA (HCC): ICD-10-CM

## 2025-05-09 DIAGNOSIS — C79.9 METASTATIC ADENOCARCINOMA (HCC): Primary | ICD-10-CM

## 2025-05-09 PROCEDURE — 96367 TX/PROPH/DG ADDL SEQ IV INF: CPT

## 2025-05-09 PROCEDURE — 96377 APPLICATON ON-BODY INJECTOR: CPT

## 2025-05-09 PROCEDURE — 96413 CHEMO IV INFUSION 1 HR: CPT

## 2025-05-09 RX ORDER — SODIUM CHLORIDE 9 MG/ML
20 INJECTION, SOLUTION INTRAVENOUS ONCE
Status: COMPLETED | OUTPATIENT
Start: 2025-05-09 | End: 2025-05-09

## 2025-05-09 RX ADMIN — PEGFILGRASTIM 6 MG: KIT SUBCUTANEOUS at 14:07

## 2025-05-09 RX ADMIN — DOCETAXEL 124.2 MG: 20 INJECTION, SOLUTION, CONCENTRATE INTRAVENOUS at 12:50

## 2025-05-09 RX ADMIN — SODIUM CHLORIDE 20 ML/HR: 0.9 INJECTION, SOLUTION INTRAVENOUS at 11:57

## 2025-05-09 RX ADMIN — DEXAMETHASONE SODIUM PHOSPHATE: 10 INJECTION, SOLUTION INTRAMUSCULAR; INTRAVENOUS at 11:57

## 2025-05-09 NOTE — PROGRESS NOTES
Recent labs reviewed.  Patient tolerated Taxotere chemotherapy treatment without reaction or issues.  Neulasta onpro applied to right arm per patient's request.  Patient advised verbally and in witting when to remove tomorrow.  Patient verbalized understanding of above. Neulasta Onpro full and blinking green upon discharge.      Paras Pitts is aware of future appt on 5/13/25 at 1400 with Dr. Langford to discuss CT scan and next steps in treatment per patient.    AVS printed and given to Paras Pitts:  Yes.      Patient ambulated off unit without incident.  All personal belongings taken with patient.

## 2025-05-13 ENCOUNTER — TELEPHONE (OUTPATIENT)
Dept: HEMATOLOGY ONCOLOGY | Facility: CLINIC | Age: 70
End: 2025-05-13

## 2025-05-13 NOTE — TELEPHONE ENCOUNTER
Let message for patient to call and reschedule his missed appointment with dr howard.[email or web address] provided

## 2025-05-20 ENCOUNTER — OFFICE VISIT (OUTPATIENT)
Dept: HEMATOLOGY ONCOLOGY | Facility: CLINIC | Age: 70
End: 2025-05-20
Payer: COMMERCIAL

## 2025-05-20 ENCOUNTER — TELEPHONE (OUTPATIENT)
Dept: HEMATOLOGY ONCOLOGY | Facility: CLINIC | Age: 70
End: 2025-05-20

## 2025-05-20 VITALS
TEMPERATURE: 98.3 F | HEART RATE: 68 BPM | HEIGHT: 69 IN | RESPIRATION RATE: 18 BRPM | WEIGHT: 193 LBS | SYSTOLIC BLOOD PRESSURE: 128 MMHG | BODY MASS INDEX: 28.58 KG/M2 | OXYGEN SATURATION: 97 % | DIASTOLIC BLOOD PRESSURE: 72 MMHG

## 2025-05-20 DIAGNOSIS — D70.1 CHEMOTHERAPY-INDUCED NEUTROPENIA (HCC): ICD-10-CM

## 2025-05-20 DIAGNOSIS — T45.1X5A CHEMOTHERAPY-INDUCED NEUTROPENIA (HCC): ICD-10-CM

## 2025-05-20 DIAGNOSIS — C34.91 CARCINOMA OF RIGHT LUNG (HCC): Primary | ICD-10-CM

## 2025-05-20 DIAGNOSIS — C79.31 METASTASIS TO BRAIN (HCC): ICD-10-CM

## 2025-05-20 PROCEDURE — 99215 OFFICE O/P EST HI 40 MIN: CPT | Performed by: INTERNAL MEDICINE

## 2025-05-20 NOTE — ASSESSMENT & PLAN NOTE
He is status post surgical resection of the oligometastatic Brain lesion followed by postoperative RT with SRS/SRT in 5 fractions to avoid local recurrence.    Brain MRI from April 2024 showed residual or recurrence of his disease.  He did receive 4 doses of Avastin to decrease the enhancement or vasogenic edema.  He will be getting another MRI of the brain on 7/14/2025 for close monitoring.

## 2025-05-20 NOTE — ASSESSMENT & PLAN NOTE
Non-small cell lung cancer favoring adenocarcinoma, clinical stage JAY at diagnosis (cT3, cN1, cM1b) S/P surgical resection of the oligometastatic brain lesion followed by SRS.  Status post 4 cycles of neoadjuvant chemotherapy carboplatin, Alimta and nivolumab 7/2023.    Status post right upper lobe lobectomy on 9/1/2023. The final pathology was ypT2b, pN2, G3.  R0.     Started on adjuvant immunotherapy with nivolumab 480 mg on every 4-week basis on 10/13/2023.  PET scan in March 2024 showed mediastinal adenopathy compatible with recurrence. This was confirmed with bronchoscopy and EBUS guided biopsy on 4/16/2024 which showed metastatic disease in the 4R, 2R lymph node area.      His case was discussed with the radiation oncology team. The decision was made to pursue 4 doses of Avastin to decrease the vasogenic edema in the brain since he was felt to have residual/recurrent disease in the brain according to the MRI from April 2024.   Instead of concurrent chemoradiation the decision was made to get him started on single agent Taxotere.  The patient received a total of 4 cycles of Taxotere followed by a PET scan on 8/5/2024 which showed persistent hypermetabolic right paratracheal mediastinal tri metastatic disease which seems to be stable with mild improvement in size but increased in activity.  The mild splenomegaly and increased diffuse bone marrow activity is most likely reactive.     The patient received 5 cycles of adjusted dose Taxotere 60 mg/m².  He then received radiation to the hypermetabolic area of the right upper lobe/mediastinum which was completed on 10/21/2024.  He then requested a break from treatment which lasted from August 2024 until the middle of February 2025.    The patient was then restarted on the adjusted dose docetaxel and had a CT scan of the chest abdomen pelvis on 4/28/2025 which showed mild progression of his disease.    I did review the recent CT scan of the chest abdomen pelvis with  the patient and his wife extensively.  We did go over the changes of the lung nodules on comparison to the prior imaging from January 2025.  He seems to have mixed response with millimeters type of progression in the lung and more mediastinal lymph node.    After lengthy discussion the decision was made to continue with the current line of palliative chemotherapy with adjusted dose of docetaxel 60 mg/m² on every 3-week basis.  He was also given the option of decreasing the frequency from every 3 weeks to every 4 weeks during the summer to improve his quality of life.    He stated he is in the process of getting tooth extraction which can be done anytime prior to the planned chemotherapy.  Orders:    CBC and differential; Future    Comprehensive metabolic panel; Future    Magnesium; Future

## 2025-05-20 NOTE — ASSESSMENT & PLAN NOTE
Neulasta will be used after each treatment to avoid neutropenic complications.   Orders:    CBC and differential; Future    Comprehensive metabolic panel; Future    Magnesium; Future

## 2025-05-20 NOTE — PROGRESS NOTES
Name: Paras Pitts      : 1955      MRN: [id number]  Encounter Provider: Collin Langford MD  Encounter Date: 2025   Encounter department: [facility] [place]  :  Assessment & Plan  Carcinoma of right lung (HCC)  Non-small cell lung cancer favoring adenocarcinoma, clinical stage JAY at diagnosis (cT3, cN1, cM1b) S/P surgical resection of the oligometastatic brain lesion followed by SRS.  Status post 4 cycles of neoadjuvant chemotherapy carboplatin, Alimta and nivolumab 2023.    Status post right upper lobe lobectomy on 2023. The final pathology was ypT2b, pN2, G3.  R0.     Started on adjuvant immunotherapy with nivolumab 480 mg on every 4-week basis on 10/13/2023.  PET scan in 2024 showed mediastinal adenopathy compatible with recurrence. This was confirmed with bronchoscopy and EBUS guided biopsy on 2024 which showed metastatic disease in the 4R, 2R lymph node area.      His case was discussed with the radiation oncology team. The decision was made to pursue 4 doses of Avastin to decrease the vasogenic edema in the brain since he was felt to have residual/recurrent disease in the brain according to the MRI from 2024.   Instead of concurrent chemoradiation the decision was made to get him started on single agent Taxotere.  The patient received a total of 4 cycles of Taxotere followed by a PET scan on 2024 which showed persistent hypermetabolic right paratracheal mediastinal tri metastatic disease which seems to be stable with mild improvement in size but increased in activity.  The mild splenomegaly and increased diffuse bone marrow activity is most likely reactive.     The patient received 5 cycles of adjusted dose Taxotere 60 mg/m².  He then received radiation to the hypermetabolic area of the right upper lobe/mediastinum which was completed on 10/21/2024.  He then requested a break from treatment which lasted from 2024 until the middle  of February 2025.    The patient was then restarted on the adjusted dose docetaxel and had a CT scan of the chest abdomen pelvis on 4/28/2025 which showed mild progression of his disease.    I did review the recent CT scan of the chest abdomen pelvis with the patient and his wife extensively.  We did go over the changes of the lung nodules on comparison to the prior imaging from January 2025.  He seems to have mixed response with millimeters type of progression in the lung and more mediastinal lymph node.    After lengthy discussion the decision was made to continue with the current line of palliative chemotherapy with adjusted dose of docetaxel 60 mg/m² on every 3-week basis.  He was also given the option of decreasing the frequency from every 3 weeks to every 4 weeks during the summer to improve his quality of life.    He stated he is in the process of getting tooth extraction which can be done anytime prior to the planned chemotherapy.  Orders:    CBC and differential; Future    Comprehensive metabolic panel; Future    Magnesium; Future    Metastasis to brain (HCC)  He is status post surgical resection of the oligometastatic Brain lesion followed by postoperative RT with SRS/SRT in 5 fractions to avoid local recurrence.    Brain MRI from April 2024 showed residual or recurrence of his disease.  He did receive 4 doses of Avastin to decrease the enhancement or vasogenic edema.  He will be getting another MRI of the brain on 7/14/2025 for close monitoring.       Chemotherapy-induced neutropenia (HCC)  Neulasta will be used after each treatment to avoid neutropenic complications.   Orders:    CBC and differential; Future    Comprehensive metabolic panel; Future    Magnesium; Future        Return in about 8 weeks (around 7/15/2025) for Office Visit 20 min, Labs, Imaging, Infusion.    History of Present Illness   Chief Complaint   Patient presents with    Follow-up   The patient came in today for a follow-up visit  accompanied by his wife.  He did complain about fatigue after each chemotherapy.  He had a CT scan of the chest and pelvis with contrast on 4/20/2025 which showed  IMPRESSION:     1. Findings suggesting progression of disease. Continued interval enlargement of pulmonary metastatic lesions. Slight interval enlargement of a right level 4 lymph node. Interval decrease in size of a solitary enlarged mediastinal lymph node. No new   lymphadenopathy. No intra-abdominal metastatic disease.     2. Stable hepatosplenomegaly with hepatic steatosis    Blood work from 5/7/2025 was reviewed with the patient which showed white cell count of 10.9 with normal hemoglobin and hematocrit.  CMP was entirely normal.  Oncology History   Cancer Staging   Carcinoma of right lung (HCC)  Staging form: Lung, AJCC 8th Edition  - Clinical: Stage JAY (cT3, cN1, cM1b) - Signed by James Contreras MD on 4/13/2023  - Pathologic stage from 9/1/2023: Stage JAY (pT2b, pN2, cM1b) - Signed by Treva Bah PA-C on 9/20/2023  Histopathologic type: Adenocarcinoma, NOS  Stage prefix: Initial diagnosis  Histologic grade (G): G3  Histologic grading system: 4 grade system  Oncology History   Metastatic adenocarcinoma (HCC)   2023 Initial Diagnosis    Metastatic adenocarcinoma (HCC)     3/23/2023 Biopsy    Brain, right frontal mass (biopsy):  - Metastatic non-small cell carcinoma     Comment:  - Tumor cells stain diffusely for CAM5.2, CKAE1/3, CK7, TTF1 and NapsinA with absent CK20, p63, CK5/6, p40 expression.  This immunopanel favors a metastatic lung adenocarcinoma.       5/18/2023 - 7/20/2023 Chemotherapy    cyanocobalamin, 1,000 mcg, Intramuscular, Once, 3 of 3 cycles  Administration: 1,000 mcg (6/29/2023), 1,000 mcg (5/18/2023), 1,000 mcg (7/20/2023)  alteplase (CATHFLO), 2 mg, Intracatheter, Every 1 Minute as needed, 4 of 4 cycles  pegfilgrastim (NEULASTA ONPRO), 6 mg, Subcutaneous, Once, 3 of 3 cycles  Administration: 6 mg (6/8/2023), 6 mg  (6/29/2023), 6 mg (7/20/2023)  fosaprepitant (EMEND) IVPB, 150 mg, Intravenous, Once, 4 of 4 cycles  Administration: 150 mg (5/18/2023), 150 mg (6/29/2023), 150 mg (7/20/2023), 150 mg (6/8/2023)  nivolumab (OPDIVO) IVPB, 360 mg (150 % of original dose 240 mg), Intravenous, Once, 4 of 4 cycles  Dose modification: 360 mg (original dose 240 mg, Cycle 1, Reason: Dose modified as per discussion with consulting physician)  Administration: 360 mg (5/18/2023), 360 mg (6/8/2023), 360 mg (6/29/2023), 360 mg (7/20/2023)  CARBOplatin (PARAPLATIN) IVPB (GO AUC DOSING), 642.5 mg, Intravenous, Once, 4 of 4 cycles  Administration: 642.5 mg (5/18/2023), 642.5 mg (6/29/2023), 566.5 mg (7/20/2023), 650 mg (6/8/2023)  pemetrexed (ALIMTA) chemo infusion, 980 mg, Intravenous, Once, 4 of 4 cycles  Administration: 1,000 mg (5/18/2023), 1,000 mg (6/29/2023), 1,000 mg (7/20/2023), 1,000 mg (6/8/2023)     10/11/2023 - 10/11/2023 Chemotherapy    alteplase (CATHFLO), 2 mg, Intracatheter, Every 1 Minute as needed, 0 of 6 cycles  nivolumab (OPDIVO) IVPB, 240 mg, Intravenous, Once, 0 of 6 cycles     10/13/2023 - 2/23/2024 Chemotherapy    alteplase (CATHFLO), 2 mg, Intracatheter, Every 1 Minute as needed, 5 of 8 cycles  nivolumab (OPDIVO) IVPB, 480 mg, Intravenous, Once, 5 of 8 cycles  Administration: 480 mg (10/13/2023), 480 mg (11/10/2023), 480 mg (12/8/2023), 480 mg (1/5/2024), 480 mg (2/23/2024)     4/16/2024 Biopsy    ENDOBRONCHIAL ULTRASOUND (EBUS)     A-C. Lymph Node, Level 4R (ThinPrep, smear and cell block preparations):    - Metastatic non-small cell carcinoma, most compatible with lung primary; see note.    - Satisfactory for evaluation.     D-F. Lymph Node, Level 2R (ThinPrep, smear and cell block preparations):    - Metastatic non-small cell carcinoma.    - Non-small cell carcinoma, favor adenocarcinoma.    - Satisfactory for evaluation.     G. Lymph Node, Level 11R:    - Atypical cellular changes seen.    - Rare atypical epithelioid  cells in a background of abundant benign bronchial cells.    - Lymphocytes present, compatible with adequate lymph node sampling.     5/15/2024 -  Chemotherapy    bevacizumab (AVASTIN) IVPB, 5 mg/kg = 457.5 mg (100 % of original dose 5 mg/kg), 3 of 3 cycles  Dose modification: 5 mg/kg (original dose 5 mg/kg, Cycle 1), 5 mg/kg (original dose 5 mg/kg, Cycle 1), 5 mg/kg (original dose 5 mg/kg, Cycle 2)  Administration: 457.5 mg (5/15/2024), 457.5 mg (5/30/2024), 457.5 mg (6/13/2024), 457.5 mg (6/27/2024)  DOCEtaxel (TAXOTERE) chemo infusion, 75 mg/m2 = 155.2 mg, 10 of 16 cycles  Dose modification: 60 mg/m2 (original dose 75 mg/m2, Cycle 6, Reason: Dose Not Tolerated)  Administration: 155.2 mg (5/15/2024), 155.2 mg (6/27/2024), 155.2 mg (6/6/2024), 155.2 mg (7/18/2024), 155.2 mg (8/8/2024), 124.2 mg (2/14/2025), 124.2 mg (3/7/2025), 124.2 mg (3/28/2025), 124.2 mg (4/18/2025), 124.2 mg (5/9/2025)     Carcinoma of right lung (HCC)   4/13/2023 Initial Diagnosis    Carcinoma of right lung (HCC)     4/13/2023 -  Cancer Staged    Staging form: Lung, AJCC 8th Edition  - Clinical: Stage JAY (cT3, cN1, cM1b) - Signed by James Contreras MD on 4/13/2023 4/19/2023 - 4/28/2023 Radiation    Plan ID Energy Fractions Dose per Fraction (cGy) Dose Correction (cGy) Total Dose Delivered (cGy) Elapsed Days   SRT R Frontal 6X-FFF 5 / 5 600 0 3,000 9         5/18/2023 - 7/20/2023 Chemotherapy    cyanocobalamin, 1,000 mcg, Intramuscular, Once, 3 of 3 cycles  Administration: 1,000 mcg (6/29/2023), 1,000 mcg (5/18/2023), 1,000 mcg (7/20/2023)  alteplase (CATHFLO), 2 mg, Intracatheter, Every 1 Minute as needed, 4 of 4 cycles  pegfilgrastim (NEULASTA ONPRO), 6 mg, Subcutaneous, Once, 3 of 3 cycles  Administration: 6 mg (6/8/2023), 6 mg (6/29/2023), 6 mg (7/20/2023)  fosaprepitant (EMEND) IVPB, 150 mg, Intravenous, Once, 4 of 4 cycles  Administration: 150 mg (5/18/2023), 150 mg (6/29/2023), 150 mg (7/20/2023), 150 mg  (6/8/2023)  nivolumab (OPDIVO) IVPB, 360 mg (150 % of original dose 240 mg), Intravenous, Once, 4 of 4 cycles  Dose modification: 360 mg (original dose 240 mg, Cycle 1, Reason: Dose modified as per discussion with consulting physician)  Administration: 360 mg (5/18/2023), 360 mg (6/8/2023), 360 mg (6/29/2023), 360 mg (7/20/2023)  CARBOplatin (PARAPLATIN) IVPB (GOG AUC DOSING), 642.5 mg, Intravenous, Once, 4 of 4 cycles  Administration: 642.5 mg (5/18/2023), 642.5 mg (6/29/2023), 566.5 mg (7/20/2023), 650 mg (6/8/2023)  pemetrexed (ALIMTA) chemo infusion, 980 mg, Intravenous, Once, 4 of 4 cycles  Administration: 1,000 mg (5/18/2023), 1,000 mg (6/29/2023), 1,000 mg (7/20/2023), 1,000 mg (6/8/2023)     9/1/2023 -  Cancer Staged    Staging form: Lung, AJCC 8th Edition  - Pathologic stage from 9/1/2023: Stage JAY (pT2b, pN2, cM1b) - Signed by Treva Bah PA-C on 9/20/2023  Histopathologic type: Adenocarcinoma, NOS  Stage prefix: Initial diagnosis  Histologic grade (G): G3  Histologic grading system: 4 grade system       9/1/2023 Surgery    Right robotic converted to open upper lobectomy      9/1/2023 Biopsy    A.  Lung, right upper lobe:     - Invasive poorly differentiated solid adenocarcinoma of the lung, 4.4 cm.     - Tumor invades into, but not through, the visceral pleura (PL1), confirmed by special VVG stains.     - Lymphatic channel invasion by tumor identified.     - Metastatic carcinoma present in three of thirteen lymph nodes (3/13).       -- Rare fibrotic granulomas present.     - All margins are negative for tumor.     - Emphysematous changes.     B.  Lymph node, level 8:     - Negative for malignancy (0/1).     C.  Lymph node, level 7, #1:     - Negative for malignancy (0/1).     D.  Lymph node, level 4R:     - Negative for malignancy (0/1).     E.  Lymph node, level 4R, #2:     - Fibrovascular adipose tissue; negative for malignancy.     - No lymphoid tissue identified.     F.  Lymph node, level 2R,  #1:     - Metastatic carcinoma present in one of three lymph nodes (1/3).     G.  Lymph node, level 2R, #2:     - Negative for malignancy (0/1).     H.  Lymph node, level 11 posterior:     - Single lymph node positive for metastatic carcinoma (1/1).     I.  Lymph node, portion of level 12 on artery:     - Negative for malignancy (0/1).     - Non-caseating granulomatous inflammation present.        -- Special stains for acid fast bacilli and fungal organisms are negative.        10/11/2023 - 10/11/2023 Chemotherapy    alteplase (CATHFLO), 2 mg, Intracatheter, Every 1 Minute as needed, 0 of 6 cycles  nivolumab (OPDIVO) IVPB, 240 mg, Intravenous, Once, 0 of 6 cycles     10/13/2023 - 2/23/2024 Chemotherapy    alteplase (CATHFLO), 2 mg, Intracatheter, Every 1 Minute as needed, 5 of 8 cycles  nivolumab (OPDIVO) IVPB, 480 mg, Intravenous, Once, 5 of 8 cycles  Administration: 480 mg (10/13/2023), 480 mg (11/10/2023), 480 mg (12/8/2023), 480 mg (1/5/2024), 480 mg (2/23/2024)     4/16/2024 Biopsy    ENDOBRONCHIAL ULTRASOUND (EBUS)     A-C. Lymph Node, Level 4R (ThinPrep, smear and cell block preparations):    - Metastatic non-small cell carcinoma, most compatible with lung primary; see note.    - Satisfactory for evaluation.     D-F. Lymph Node, Level 2R (ThinPrep, smear and cell block preparations):    - Metastatic non-small cell carcinoma.    - Non-small cell carcinoma, favor adenocarcinoma.    - Satisfactory for evaluation.     G. Lymph Node, Level 11R:    - Atypical cellular changes seen.    - Rare atypical epithelioid cells in a background of abundant benign bronchial cells.    - Lymphocytes present, compatible with adequate lymph node sampling.     5/15/2024 -  Chemotherapy    bevacizumab (AVASTIN) IVPB, 5 mg/kg = 457.5 mg (100 % of original dose 5 mg/kg), 3 of 3 cycles  Dose modification: 5 mg/kg (original dose 5 mg/kg, Cycle 1), 5 mg/kg (original dose 5 mg/kg, Cycle 1), 5 mg/kg (original dose 5 mg/kg, Cycle  2)  Administration: 457.5 mg (5/15/2024), 457.5 mg (5/30/2024), 457.5 mg (6/13/2024), 457.5 mg (6/27/2024)  DOCEtaxel (TAXOTERE) chemo infusion, 75 mg/m2 = 155.2 mg, 10 of 16 cycles  Dose modification: 60 mg/m2 (original dose 75 mg/m2, Cycle 6, Reason: Dose Not Tolerated)  Administration: 155.2 mg (5/15/2024), 155.2 mg (6/27/2024), 155.2 mg (6/6/2024), 155.2 mg (7/18/2024), 155.2 mg (8/8/2024), 124.2 mg (2/14/2025), 124.2 mg (3/7/2025), 124.2 mg (3/28/2025), 124.2 mg (4/18/2025), 124.2 mg (5/9/2025)     9/9/2024 - 10/21/2024 Radiation    Treatments:  Course: C2    Plan ID Energy Fractions Dose per Fraction (cGy) Dose Correction (cGy) Total Dose Delivered (cGy) Elapsed Days   R LUNG Med 6X 30 / 30 200 0 6,000 42      Treatment Dates:  9/9/2024 - 10/21/2024.      Metastasis to brain (HCC)   4/19/2023 - 4/28/2023 Radiation    Plan ID Energy Fractions Dose per Fraction (cGy) Dose Correction (cGy) Total Dose Delivered (cGy) Elapsed Days   SRT R Frontal 6X-FFF 5 / 5 600 0 3,000 9         11/21/2024 Initial Diagnosis    Metastasis to brain (HCC)          Review of Systems   Constitutional:  Negative for chills and fever.   HENT:  Positive for hearing loss. Negative for ear pain and sore throat.    Eyes:  Negative for pain and visual disturbance.   Respiratory:  Positive for cough and shortness of breath.    Cardiovascular:  Negative for chest pain and palpitations.   Gastrointestinal:  Negative for abdominal pain and vomiting.   Genitourinary:  Negative for dysuria and hematuria.   Musculoskeletal:  Negative for arthralgias and back pain.   Skin:  Negative for color change and rash.   Neurological:  Positive for numbness. Negative for seizures and syncope.   All other systems reviewed and are negative.    Medical History Reviewed by provider this encounter:  Tobacco  Allergies  Meds  Problems  Med Hx  Surg Hx  Fam Hx     .  Medications Ordered Prior to Encounter[1]   Social History     Tobacco Use    Smoking status:  "Former     Current packs/day: 0.00     Average packs/day: 1 pack/day for 15.0 years (15.0 ttl pk-yrs)     Types: Cigarettes     Start date:      Quit date:      Years since quittin.4     Passive exposure: Never    Smokeless tobacco: Never    Tobacco comments:     i quit when i was 28. not sure of exact dates   Vaping Use    Vaping status: Never Used   Substance and Sexual Activity    Alcohol use: Not Currently     Alcohol/week: 6.0 standard drinks of alcohol     Types: 6 Cans of beer per week     Comment: socially    Drug use: Yes     Types: Marijuana     Comment: gummies for nausea with chemo    Sexual activity: Yes     Partners: Female     Birth control/protection: None         Objective   /72 (BP Location: Right arm, Patient Position: Sitting, Cuff Size: Adult)   Pulse 68   Temp 98.3 °F (36.8 °C)   Resp 18   Ht 5' 9\" (1.753 m)   Wt 87.5 kg (193 lb)   SpO2 97%   BMI 28.50 kg/m²     Pain Screening:  Pain Score: 0-No pain  ECOG   2  Physical Exam  Constitutional:       Appearance: He is well-developed.   HENT:      Head: Normocephalic and atraumatic.      Nose: Nose normal.     Eyes:      General: No scleral icterus.        Right eye: No discharge.         Left eye: No discharge.      Conjunctiva/sclera: Conjunctivae normal.      Pupils: Pupils are equal, round, and reactive to light.     Neck:      Thyroid: No thyromegaly.      Trachea: No tracheal deviation.     Cardiovascular:      Rate and Rhythm: Normal rate and regular rhythm.      Heart sounds: Normal heart sounds. No murmur heard.     No friction rub.   Pulmonary:      Effort: Pulmonary effort is normal. No respiratory distress.      Breath sounds: Normal breath sounds. No wheezing or rales.   Chest:      Chest wall: No tenderness.   Abdominal:      General: Bowel sounds are normal. There is no distension.      Palpations: Abdomen is soft. There is no hepatomegaly or splenomegaly.      Tenderness: There is no abdominal tenderness. " There is no guarding or rebound.     Musculoskeletal:         General: No tenderness or deformity. Normal range of motion.      Cervical back: Normal range of motion and neck supple.   Lymphadenopathy:      Cervical: No cervical adenopathy.     Skin:     General: Skin is warm and dry.      Coloration: Skin is not pale.      Findings: No erythema or rash.     Neurological:      Mental Status: He is alert and oriented to person, place, and time.      Cranial Nerves: No cranial nerve deficit.      Coordination: Coordination normal.      Deep Tendon Reflexes: Reflexes are normal and symmetric.     Psychiatric:         Behavior: Behavior normal.         Thought Content: Thought content normal.         Judgment: Judgment normal.         Labs: I have reviewed the following labs:  Lab Results   Component Value Date/Time    WBC 10.98 (H) 05/07/2025 11:47 AM    RBC 4.05 05/07/2025 11:47 AM    Hemoglobin 12.4 05/07/2025 11:47 AM    Hematocrit 39.3 05/07/2025 11:47 AM    MCV 97 05/07/2025 11:47 AM    MCH 30.6 05/07/2025 11:47 AM    RDW 15.6 (H) 05/07/2025 11:47 AM    Platelets 220 05/07/2025 11:47 AM    Segmented % 80 (H) 05/07/2025 11:47 AM    Lymphocytes % 11 (L) 05/07/2025 11:47 AM    Monocytes % 7 05/07/2025 11:47 AM    Eosinophils Relative 0 05/07/2025 11:47 AM    Basophils Relative 1 05/07/2025 11:47 AM    Immature Grans % 1 05/07/2025 11:47 AM    Absolute Neutrophils 8.80 (H) 05/07/2025 11:47 AM     Lab Results   Component Value Date/Time    Potassium 4.1 05/07/2025 11:47 AM    Chloride 106 05/07/2025 11:47 AM    CO2 30 05/07/2025 11:47 AM    BUN 14 05/07/2025 11:47 AM    Creatinine 0.68 05/07/2025 11:47 AM    Glucose, Fasting 107 (H) 12/12/2024 08:31 AM    Calcium 9.2 05/07/2025 11:47 AM    AST 21 05/07/2025 11:47 AM    ALT 16 05/07/2025 11:47 AM    Alkaline Phosphatase 73 05/07/2025 11:47 AM    Total Protein 6.9 05/07/2025 11:47 AM    Albumin 4.4 05/07/2025 11:47 AM    Total Bilirubin 0.64 05/07/2025 11:47 AM    eGFR  "96 05/07/2025 11:47 AM     Lab Results   Component Value Date/Time    WBC 10.98 (H) 05/07/2025 11:47 AM    RBC 4.05 05/07/2025 11:47 AM    Hemoglobin 12.4 05/07/2025 11:47 AM    Hematocrit 39.3 05/07/2025 11:47 AM    MCV 97 05/07/2025 11:47 AM    MCH 30.6 05/07/2025 11:47 AM    RDW 15.6 (H) 05/07/2025 11:47 AM    Platelets 220 05/07/2025 11:47 AM    Segmented % 80 (H) 05/07/2025 11:47 AM    Lymphocytes % 11 (L) 05/07/2025 11:47 AM    Monocytes % 7 05/07/2025 11:47 AM    Eosinophils Relative 0 05/07/2025 11:47 AM    Basophils Relative 1 05/07/2025 11:47 AM    Immature Grans % 1 05/07/2025 11:47 AM    Absolute Neutrophils 8.80 (H) 05/07/2025 11:47 AM      Lab Results   Component Value Date/Time    Sodium 143 05/07/2025 11:47 AM    Potassium 4.1 05/07/2025 11:47 AM    Chloride 106 05/07/2025 11:47 AM    CO2 30 05/07/2025 11:47 AM    ANION GAP 7 05/07/2025 11:47 AM    BUN 14 05/07/2025 11:47 AM    Creatinine 0.68 05/07/2025 11:47 AM    Glucose 112 05/07/2025 11:47 AM    Glucose, Fasting 107 (H) 12/12/2024 08:31 AM    Calcium 9.2 05/07/2025 11:47 AM    AST 21 05/07/2025 11:47 AM    ALT 16 05/07/2025 11:47 AM    Alkaline Phosphatase 73 05/07/2025 11:47 AM    Total Protein 6.9 05/07/2025 11:47 AM    Albumin 4.4 05/07/2025 11:47 AM    Total Bilirubin 0.64 05/07/2025 11:47 AM    eGFR 96 05/07/2025 11:47 AM      No results found for: \"IRON\", \"CONCFE\", \"FERRITIN\", \"QINTPGPS08\", \"FOLATE\", \"COPPER\", \"EPOREFLAB\", \"ERYTHROPRO\", \"ESR\", \"CRP\", \"HIVAGAB\", \"HEPATITIS\"   Results for orders placed or performed during the hospital encounter of 05/07/25   CBC and differential   Result Value Ref Range    WBC 10.98 (H) 4.31 - 10.16 Thousand/uL    RBC 4.05 3.88 - 5.62 Million/uL    Hemoglobin 12.4 12.0 - 17.0 g/dL    Hematocrit 39.3 36.5 - 49.3 %    MCV 97 82 - 98 fL    MCH 30.6 26.8 - 34.3 pg    MCHC 31.6 31.4 - 37.4 g/dL    RDW 15.6 (H) 11.6 - 15.1 %    MPV 10.1 8.9 - 12.7 fL    Platelets 220 149 - 390 Thousands/uL    nRBC 0 /100 WBCs    " Segmented % 80 (H) 43 - 75 %    Immature Grans % 1 0 - 2 %    Lymphocytes % 11 (L) 14 - 44 %    Monocytes % 7 4 - 12 %    Eosinophils Relative 0 0 - 6 %    Basophils Relative 1 0 - 1 %    Absolute Neutrophils 8.80 (H) 1.85 - 7.62 Thousands/µL    Absolute Immature Grans 0.09 0.00 - 0.20 Thousand/uL    Absolute Lymphocytes 1.16 0.60 - 4.47 Thousands/µL    Absolute Monocytes 0.78 0.17 - 1.22 Thousand/µL    Eosinophils Absolute 0.02 0.00 - 0.61 Thousand/µL    Basophils Absolute 0.13 (H) 0.00 - 0.10 Thousands/µL   Comprehensive metabolic panel   Result Value Ref Range    Sodium 143 135 - 147 mmol/L    Potassium 4.1 3.5 - 5.3 mmol/L    Chloride 106 96 - 108 mmol/L    CO2 30 21 - 32 mmol/L    ANION GAP 7 4 - 13 mmol/L    BUN 14 5 - 25 mg/dL    Creatinine 0.68 0.60 - 1.30 mg/dL    Glucose 112 65 - 140 mg/dL    Calcium 9.2 8.4 - 10.2 mg/dL    AST 21 13 - 39 U/L    ALT 16 7 - 52 U/L    Alkaline Phosphatase 73 34 - 104 U/L    Total Protein 6.9 6.4 - 8.4 g/dL    Albumin 4.4 3.5 - 5.0 g/dL    Total Bilirubin 0.64 0.20 - 1.00 mg/dL    eGFR 96 ml/min/1.73sq m   Result Value Ref Range    Magnesium 1.9 1.9 - 2.7 mg/dL     *Note: Due to a large number of results and/or encounters for the requested time period, some results have not been displayed. A complete set of results can be found in Results Review.                   [1]   Current Outpatient Medications on File Prior to Visit   Medication Sig Dispense Refill    acetaminophen (TYLENOL) 325 mg tablet Take 2 tablets (650 mg total) by mouth every 6 (six) hours as needed for mild pain or fever      amLODIPine (NORVASC) 10 mg tablet Take 1 tablet (10 mg total) by mouth daily 30 tablet 5    amoxicillin (AMOXIL) 500 mg capsule       atorvastatin (LIPITOR) 40 mg tablet Take 1 tablet (40 mg total) by mouth daily after dinner 30 tablet 5    BOSWELLIA DANYA PO Take 4 g by mouth in the morning      dexamethasone (DECADRON) 4 mg tablet Take 2 tablets (8 mg total) by mouth 2 (two) times a  day with meals Day before, Day of, and Day after chemotherapy. 12 tablet 5    lacosamide (VIMPAT) 200 mg tablet Take 1 tablet (200 mg total) by mouth every 12 (twelve) hours 60 tablet 5    levETIRAcetam (Keppra) 1000 MG tablet Take 2 tablets (2,000 mg total) by mouth 2 (two) times a day 120 tablet 11    loperamide (IMODIUM A-D) 2 MG tablet Take 2 mg by mouth as needed in the morning and 2 mg as needed at noon and 2 mg as needed in the evening and 2 mg as needed before bedtime for diarrhea.      losartan (COZAAR) 50 mg tablet Take 1 tablet (50 mg total) by mouth daily 30 tablet 5    Turmeric (QC TUMERIC COMPLEX PO) Take by mouth in the morning       No current facility-administered medications on file prior to visit.

## 2025-05-21 DIAGNOSIS — D70.1 CHEMOTHERAPY-INDUCED NEUTROPENIA (HCC): ICD-10-CM

## 2025-05-21 DIAGNOSIS — C79.9 METASTATIC ADENOCARCINOMA (HCC): Primary | ICD-10-CM

## 2025-05-21 DIAGNOSIS — T45.1X5A CHEMOTHERAPY-INDUCED NEUTROPENIA (HCC): ICD-10-CM

## 2025-05-21 DIAGNOSIS — C34.91 CARCINOMA OF RIGHT LUNG (HCC): ICD-10-CM

## 2025-05-21 NOTE — TELEPHONE ENCOUNTER
Spoke with patient & scheduled labs 5/29 and treatment 5/30 - please sign appt requests.  Thanks!

## 2025-05-23 RX ORDER — SODIUM CHLORIDE 9 MG/ML
20 INJECTION, SOLUTION INTRAVENOUS ONCE
OUTPATIENT
Start: 2025-05-30

## 2025-05-27 ENCOUNTER — TELEPHONE (OUTPATIENT)
Age: 70
End: 2025-05-27

## 2025-05-27 NOTE — TELEPHONE ENCOUNTER
Regina from [employer] calling in regarding the authorization of the pt's Neulasta Onpro - approval code [id number] from date 5/30/25-10/3/25 for the [facility].

## 2025-05-29 ENCOUNTER — HOSPITAL ENCOUNTER (OUTPATIENT)
Dept: INFUSION CENTER | Facility: HOSPITAL | Age: 70
Discharge: HOME | End: 2025-05-29
Attending: INTERNAL MEDICINE
Payer: COMMERCIAL

## 2025-05-29 DIAGNOSIS — C79.9 METASTATIC ADENOCARCINOMA (HCC): ICD-10-CM

## 2025-05-29 DIAGNOSIS — D70.1 CHEMOTHERAPY-INDUCED NEUTROPENIA (HCC): ICD-10-CM

## 2025-05-29 DIAGNOSIS — C34.91 CARCINOMA OF RIGHT LUNG (HCC): ICD-10-CM

## 2025-05-29 DIAGNOSIS — T45.1X5A CHEMOTHERAPY-INDUCED NEUTROPENIA (HCC): ICD-10-CM

## 2025-05-29 LAB
ALBUMIN SERPL BCG-MCNC: 4.4 G/DL (ref 3.5–5)
ALP SERPL-CCNC: 72 U/L (ref 34–104)
ALT SERPL W P-5'-P-CCNC: 15 U/L (ref 7–52)
ANION GAP SERPL CALCULATED.3IONS-SCNC: 6 MMOL/L (ref 4–13)
AST SERPL W P-5'-P-CCNC: 21 U/L (ref 13–39)
BASOPHILS # BLD AUTO: 0.09 THOUSANDS/ÂΜL (ref 0–0.1)
BASOPHILS NFR BLD AUTO: 2 % (ref 0–1)
BILIRUB SERPL-MCNC: 0.77 MG/DL (ref 0.2–1)
BUN SERPL-MCNC: 8 MG/DL (ref 5–25)
CALCIUM SERPL-MCNC: 9 MG/DL (ref 8.4–10.2)
CHLORIDE SERPL-SCNC: 108 MMOL/L (ref 96–108)
CO2 SERPL-SCNC: 28 MMOL/L (ref 21–32)
CREAT SERPL-MCNC: 0.68 MG/DL (ref 0.6–1.3)
EOSINOPHIL # BLD AUTO: 0.03 THOUSAND/ÂΜL (ref 0–0.61)
EOSINOPHIL NFR BLD AUTO: 1 % (ref 0–6)
ERYTHROCYTE [DISTWIDTH] IN BLOOD BY AUTOMATED COUNT: 15.3 % (ref 11.6–15.1)
GFR SERPL CREATININE-BSD FRML MDRD: 96 ML/MIN/1.73SQ M
GLUCOSE SERPL-MCNC: 109 MG/DL (ref 65–140)
HCT VFR BLD AUTO: 31.7 % (ref 36.5–49.3)
HGB BLD-MCNC: 9.8 G/DL (ref 12–17)
IMM GRANULOCYTES # BLD AUTO: 0.03 THOUSAND/UL (ref 0–0.2)
IMM GRANULOCYTES NFR BLD AUTO: 1 % (ref 0–2)
LYMPHOCYTES # BLD AUTO: 0.84 THOUSANDS/ÂΜL (ref 0.6–4.47)
LYMPHOCYTES NFR BLD AUTO: 17 % (ref 14–44)
MAGNESIUM SERPL-MCNC: 2.1 MG/DL (ref 1.9–2.7)
MCH RBC QN AUTO: 30.6 PG (ref 26.8–34.3)
MCHC RBC AUTO-ENTMCNC: 30.9 G/DL (ref 31.4–37.4)
MCV RBC AUTO: 99 FL (ref 82–98)
MONOCYTES # BLD AUTO: 0.39 THOUSAND/ÂΜL (ref 0.17–1.22)
MONOCYTES NFR BLD AUTO: 8 % (ref 4–12)
NEUTROPHILS # BLD AUTO: 3.61 THOUSANDS/ÂΜL (ref 1.85–7.62)
NEUTS SEG NFR BLD AUTO: 71 % (ref 43–75)
NRBC BLD AUTO-RTO: 0 /100 WBCS
PLATELET # BLD AUTO: 175 THOUSANDS/UL (ref 149–390)
PMV BLD AUTO: 10 FL (ref 8.9–12.7)
POTASSIUM SERPL-SCNC: 4 MMOL/L (ref 3.5–5.3)
PROT SERPL-MCNC: 6.7 G/DL (ref 6.4–8.4)
RBC # BLD AUTO: 3.2 MILLION/UL (ref 3.88–5.62)
SODIUM SERPL-SCNC: 142 MMOL/L (ref 135–147)
WBC # BLD AUTO: 4.99 THOUSAND/UL (ref 4.31–10.16)

## 2025-05-29 PROCEDURE — 80053 COMPREHEN METABOLIC PANEL: CPT | Performed by: INTERNAL MEDICINE

## 2025-05-29 PROCEDURE — 85025 COMPLETE CBC W/AUTO DIFF WBC: CPT | Performed by: INTERNAL MEDICINE

## 2025-05-29 PROCEDURE — 83735 ASSAY OF MAGNESIUM: CPT | Performed by: INTERNAL MEDICINE

## 2025-05-29 NOTE — PROGRESS NOTES
Paras Pitts  tolerated treatment well with no complications.      Paras Pitts is aware of future appt on 5/30 at 0800.     AVS printed and given to Paras Pitts:    No (Declined by Paras Pitts)

## 2025-05-30 ENCOUNTER — HOSPITAL ENCOUNTER (OUTPATIENT)
Dept: INFUSION CENTER | Facility: HOSPITAL | Age: 70
Discharge: HOME | End: 2025-05-30
Attending: INTERNAL MEDICINE
Payer: COMMERCIAL

## 2025-05-30 VITALS
TEMPERATURE: 96.9 F | RESPIRATION RATE: 16 BRPM | HEART RATE: 81 BPM | SYSTOLIC BLOOD PRESSURE: 128 MMHG | DIASTOLIC BLOOD PRESSURE: 74 MMHG | HEIGHT: 69 IN | WEIGHT: 197.09 LBS | BODY MASS INDEX: 29.19 KG/M2 | OXYGEN SATURATION: 97 %

## 2025-05-30 DIAGNOSIS — C34.91 CARCINOMA OF RIGHT LUNG (HCC): ICD-10-CM

## 2025-05-30 DIAGNOSIS — D70.1 CHEMOTHERAPY-INDUCED NEUTROPENIA (HCC): ICD-10-CM

## 2025-05-30 DIAGNOSIS — C79.9 METASTATIC ADENOCARCINOMA (HCC): Primary | ICD-10-CM

## 2025-05-30 DIAGNOSIS — T45.1X5A CHEMOTHERAPY-INDUCED NEUTROPENIA (HCC): ICD-10-CM

## 2025-05-30 PROCEDURE — 96413 CHEMO IV INFUSION 1 HR: CPT

## 2025-05-30 PROCEDURE — 96377 APPLICATON ON-BODY INJECTOR: CPT

## 2025-05-30 PROCEDURE — 96367 TX/PROPH/DG ADDL SEQ IV INF: CPT

## 2025-05-30 RX ORDER — SODIUM CHLORIDE 9 MG/ML
20 INJECTION, SOLUTION INTRAVENOUS ONCE
Status: COMPLETED | OUTPATIENT
Start: 2025-05-30 | End: 2025-05-30

## 2025-05-30 RX ADMIN — DEXAMETHASONE SODIUM PHOSPHATE: 10 INJECTION, SOLUTION INTRAMUSCULAR; INTRAVENOUS at 08:51

## 2025-05-30 RX ADMIN — PEGFILGRASTIM 6 MG: KIT SUBCUTANEOUS at 10:55

## 2025-05-30 RX ADMIN — SODIUM CHLORIDE 20 ML/HR: 0.9 INJECTION, SOLUTION INTRAVENOUS at 08:51

## 2025-05-30 RX ADMIN — DOCETAXEL 124.2 MG: 20 INJECTION, SOLUTION, CONCENTRATE INTRAVENOUS at 09:37

## 2025-05-30 NOTE — PROGRESS NOTES
Recent labs reviewed.  Patient tolerated taxotere chemotherapy treatment without reaction or issues. Paras Pitts  tolerated treatment well with no complications.      Paras Pitts is aware of future appt on 06/26/25 at 1330.     AVS printed for pt at this time.       Patient ambulated off unit without incident.  All personal belongings taken with patient.

## 2025-06-05 ENCOUNTER — OFFICE VISIT (OUTPATIENT)
Dept: AUDIOLOGY | Facility: CLINIC | Age: 70
End: 2025-06-05

## 2025-06-05 DIAGNOSIS — H90.3 SENSORY HEARING LOSS, BILATERAL: Primary | ICD-10-CM

## 2025-06-05 NOTE — PROGRESS NOTES
Hearing Aid Evaluation  Name:  Paras Pitts  :  1955  Age:  69 y.o.  MRN:  [id number]  Date of Evaluation: 25     HISTORY:    Paras Pitts was seen today for a hearing aid evaluation following his audiometric testing performed on 25. Paras was referred by Dr. Craven.     RESULTS REVIEW:    The audiometric findings were reviewed with the patient . All of the patient's questions regarding his hearing status were addressed, and the importance of realistic expectations of hearing loss and amplification were discussed.      DEVICE REVIEW & RECOMMENDATION:     Strengths and limitations of amplification, including various hearing aid styles, technology, options, and accessories were discussed at length with patient. Hearing aids are assistive devices and are not designed to restore normal hearing. The patient was counseled on the importance of self-advocacy, motivation, as well as effective communication strategies that can be used to optimize hearing aid success. Based on the degree of his hearing loss, preferences, and lifestyle needs, the following hearing recommendations were made:    The first hearing aid recommendation is Oticon Intent 3.      [facility]'s office policies regarding our hearing aid program were reviewed, including the 45 day trial period, non-refundable return fees, as well as the  warranties and service plan. Hearing aid cost, and payment, as well as insurance benefit (if applicable) were discussed with the patient.     *See attached quote sheet    DEVICE SELECTION:    At this time, patient wishes to proceed with the purchase of the below listed hearing aid(s).     The patient selected the Oticon Intent 3 hearing aids.    Level: Intermediate   Color: chroma beige     size: Right 2-85 / Left 2-85   Dome size: 8mm DB   Accessories:       Devices ordered as specified above (order # [id number] ).    Sampson Angela, CCC-A  Clinical Audiologist  [facility] [address] [address]

## 2025-06-12 ENCOUNTER — OFFICE VISIT (OUTPATIENT)
Dept: AUDIOLOGY | Facility: CLINIC | Age: 70
End: 2025-06-12
Payer: COMMERCIAL

## 2025-06-12 DIAGNOSIS — H90.3 SENSORY HEARING LOSS, BILATERAL: Primary | ICD-10-CM

## 2025-06-12 PROCEDURE — V5160 DISPENSING FEE BINAURAL: HCPCS | Performed by: AUDIOLOGIST-HEARING AID FITTER

## 2025-06-12 PROCEDURE — V5261 HEARING AID, DIGIT, BIN, BTE: HCPCS | Performed by: AUDIOLOGIST-HEARING AID FITTER

## 2025-06-12 NOTE — PROGRESS NOTES
Hearing Aid Fitting    Name:  Paras Pitts  :  1955  Age:  69 y.o.  MRN:  [id number]  Date of Evaluation: 25     HISTORY:    Paras Pitts was seen today for a binaural hearing aid fitting of his Oticon Intent 3 hearing aid(s). Hearing aid purchase is being paid by insurance benefit .    DEVICE INFORMATION:     Left Device Right Device   Hearing Aid Make: Oticon  Oticon    Hearing Aid Model: Intent 3 Intent 3   Serial Number: [id number]   Repair Warranty Date: 28   Loss/Damage Warranty Status: Active  Active        Length/Output 2-85 2-85   Wax System: Pro Wax miniFIT Pro Wax miniFIT   Dome Size/Style:  8mm DB 8mm DB   Battery: Lithium-ion Rechargeable Lithium-ion Rechargeable      [employer]old Serial Number: N/A N/A   [employer]old Warranty Date:  N/A N/A    Serial Number: N/A   Warranty Date: N/A    Accessories: N/A       DEVICE SETTINGS:    Hearing aid(s) were programmed using Oticon's fitting formula and were adjusted based on the patient's perceived comfort level. Hearing aid(s) was set to experience level 1 per patient's subjective listening preference. The patient noted good sound quality, and was happy with the overall sound quality and fit of the hearing aid(s).    DEVICE ORIENTATION:    The patient was counseled on device components and component function. Proper insertion and removal of the aid(s) was demonstrated. The patient practiced insertion and removal of the devices in the office, they demonstrated excellent ability to manipulate the hearing aids. The patient  was given the devices users manual that reviews aid usage and operation, hearing aid cleaning tools, and hearing aid carrying case.     The hearing aid warranty, including unlimited repair and a one time loss and damage per hearing aid, through the , as well as [facility]'s hearing aid service plan, including unlimited office visits, and supplies were outlined thoroughly. The patient agreed to the terms  of sale listed on the purchase agreement containing device specifications, warranties, pricing information, as well as [facility]'s 45-day trial period timeline. After this period has elapsed, hearing aids cannot be returned.        DEVICE EXPECTATIONS & USAGE:     Hearing aids are assistive devices and are not designed to restore normal hearing sensitivity. The importance of realistic expectations, especially in the presence of background noise, was emphasized. The need for daily, consistent usage (8-12 hours per day) for proper device adjustment, as well as the importance of self-advocacy and practicing effective communication strategies was outlined.     Effective communication strategies include:  1.) Maintaining face-to-face communication, allowing for speechreading of facial expressions, lips, and gestures.  2.) Reducing background noise and distance between communication partners.  3.) Having communication partners reduce their rate of speech when appropriate.  4.) Beginning conversation by getting communication partner's attention.  5.) Asking for rephrasing of missed aspects of conversation rather than asking for repetition.    RECOMMENDATIONS:  The patient demonstrated understanding of all the topics discussed. The patientis to follow-up in 2-3 weeks for a hearing aid check within the trial period as scheduled.     Sampson Angela, CCC-A  Clinical Audiologist   [facility] [address] [address]

## 2025-06-20 RX ORDER — SODIUM CHLORIDE 9 MG/ML
20 INJECTION, SOLUTION INTRAVENOUS ONCE
Status: CANCELLED | OUTPATIENT
Start: 2025-06-27

## 2025-06-26 ENCOUNTER — HOSPITAL ENCOUNTER (OUTPATIENT)
Dept: INFUSION CENTER | Facility: HOSPITAL | Age: 70
End: 2025-06-26
Attending: INTERNAL MEDICINE
Payer: COMMERCIAL

## 2025-06-26 DIAGNOSIS — C34.91 CARCINOMA OF RIGHT LUNG (HCC): ICD-10-CM

## 2025-06-26 DIAGNOSIS — Z45.2 ENCOUNTER FOR CENTRAL LINE CARE: ICD-10-CM

## 2025-06-26 DIAGNOSIS — T45.1X5A CHEMOTHERAPY-INDUCED NEUTROPENIA (HCC): ICD-10-CM

## 2025-06-26 DIAGNOSIS — D70.1 CHEMOTHERAPY-INDUCED NEUTROPENIA (HCC): ICD-10-CM

## 2025-06-26 DIAGNOSIS — C79.9 METASTATIC ADENOCARCINOMA (HCC): Primary | ICD-10-CM

## 2025-06-26 LAB
ALBUMIN SERPL BCG-MCNC: 4.3 G/DL (ref 3.5–5)
ALP SERPL-CCNC: 70 U/L (ref 34–104)
ALT SERPL W P-5'-P-CCNC: 12 U/L (ref 7–52)
ANION GAP SERPL CALCULATED.3IONS-SCNC: 7 MMOL/L (ref 4–13)
AST SERPL W P-5'-P-CCNC: 19 U/L (ref 13–39)
BASOPHILS # BLD AUTO: 0.1 THOUSANDS/ÂΜL (ref 0–0.1)
BASOPHILS NFR BLD AUTO: 1 % (ref 0–1)
BILIRUB SERPL-MCNC: 0.6 MG/DL (ref 0.2–1)
BUN SERPL-MCNC: 15 MG/DL (ref 5–25)
CALCIUM SERPL-MCNC: 8.8 MG/DL (ref 8.4–10.2)
CHLORIDE SERPL-SCNC: 108 MMOL/L (ref 96–108)
CO2 SERPL-SCNC: 28 MMOL/L (ref 21–32)
CREAT SERPL-MCNC: 0.75 MG/DL (ref 0.6–1.3)
EOSINOPHIL # BLD AUTO: 0.58 THOUSAND/ÂΜL (ref 0–0.61)
EOSINOPHIL NFR BLD AUTO: 7 % (ref 0–6)
ERYTHROCYTE [DISTWIDTH] IN BLOOD BY AUTOMATED COUNT: 15.1 % (ref 11.6–15.1)
GFR SERPL CREATININE-BSD FRML MDRD: 91 ML/MIN/1.73SQ M
GLUCOSE SERPL-MCNC: 119 MG/DL (ref 65–140)
HCT VFR BLD AUTO: 39.1 % (ref 36.5–49.3)
HGB BLD-MCNC: 12.3 G/DL (ref 12–17)
IMM GRANULOCYTES # BLD AUTO: 0.05 THOUSAND/UL (ref 0–0.2)
IMM GRANULOCYTES NFR BLD AUTO: 1 % (ref 0–2)
LYMPHOCYTES # BLD AUTO: 1.32 THOUSANDS/ÂΜL (ref 0.6–4.47)
LYMPHOCYTES NFR BLD AUTO: 15 % (ref 14–44)
MAGNESIUM SERPL-MCNC: 1.9 MG/DL (ref 1.9–2.7)
MCH RBC QN AUTO: 30.3 PG (ref 26.8–34.3)
MCHC RBC AUTO-ENTMCNC: 31.5 G/DL (ref 31.4–37.4)
MCV RBC AUTO: 96 FL (ref 82–98)
MONOCYTES # BLD AUTO: 0.48 THOUSAND/ÂΜL (ref 0.17–1.22)
MONOCYTES NFR BLD AUTO: 6 % (ref 4–12)
NEUTROPHILS # BLD AUTO: 6.14 THOUSANDS/ÂΜL (ref 1.85–7.62)
NEUTS SEG NFR BLD AUTO: 70 % (ref 43–75)
NRBC BLD AUTO-RTO: 0 /100 WBCS
PLATELET # BLD AUTO: 228 THOUSANDS/UL (ref 149–390)
PMV BLD AUTO: 9.8 FL (ref 8.9–12.7)
POTASSIUM SERPL-SCNC: 4 MMOL/L (ref 3.5–5.3)
PROT SERPL-MCNC: 6.5 G/DL (ref 6.4–8.4)
RBC # BLD AUTO: 4.06 MILLION/UL (ref 3.88–5.62)
SODIUM SERPL-SCNC: 143 MMOL/L (ref 135–147)
WBC # BLD AUTO: 8.67 THOUSAND/UL (ref 4.31–10.16)

## 2025-06-26 PROCEDURE — 80053 COMPREHEN METABOLIC PANEL: CPT | Performed by: INTERNAL MEDICINE

## 2025-06-26 PROCEDURE — 83735 ASSAY OF MAGNESIUM: CPT | Performed by: INTERNAL MEDICINE

## 2025-06-26 PROCEDURE — 85025 COMPLETE CBC W/AUTO DIFF WBC: CPT | Performed by: INTERNAL MEDICINE

## 2025-06-26 NOTE — PROGRESS NOTES
Paras Pitts  tolerated port flush well with no complications. Central labs drawn via port.    Paras Pitts is aware of future appt tomorrow at 8AM.     AVS declined by Paras Pitts.

## 2025-06-27 ENCOUNTER — HOSPITAL ENCOUNTER (OUTPATIENT)
Dept: INFUSION CENTER | Facility: HOSPITAL | Age: 70
End: 2025-06-27
Attending: INTERNAL MEDICINE
Payer: COMMERCIAL

## 2025-06-27 VITALS
BODY MASS INDEX: 29.18 KG/M2 | TEMPERATURE: 97.8 F | DIASTOLIC BLOOD PRESSURE: 71 MMHG | SYSTOLIC BLOOD PRESSURE: 147 MMHG | HEART RATE: 88 BPM | WEIGHT: 197 LBS | HEIGHT: 69 IN | RESPIRATION RATE: 18 BRPM

## 2025-06-27 DIAGNOSIS — T45.1X5A CHEMOTHERAPY-INDUCED NEUTROPENIA (HCC): ICD-10-CM

## 2025-06-27 DIAGNOSIS — D70.1 CHEMOTHERAPY-INDUCED NEUTROPENIA (HCC): ICD-10-CM

## 2025-06-27 DIAGNOSIS — C79.9 METASTATIC ADENOCARCINOMA (HCC): Primary | ICD-10-CM

## 2025-06-27 DIAGNOSIS — C34.91 CARCINOMA OF RIGHT LUNG (HCC): ICD-10-CM

## 2025-06-27 PROCEDURE — 96367 TX/PROPH/DG ADDL SEQ IV INF: CPT

## 2025-06-27 PROCEDURE — 96413 CHEMO IV INFUSION 1 HR: CPT

## 2025-06-27 PROCEDURE — 96377 APPLICATON ON-BODY INJECTOR: CPT

## 2025-06-27 RX ORDER — DEXAMETHASONE 4 MG/1
TABLET ORAL
Qty: 12 TABLET | Refills: 0 | Status: SHIPPED | OUTPATIENT
Start: 2025-06-27

## 2025-06-27 RX ORDER — SODIUM CHLORIDE 9 MG/ML
20 INJECTION, SOLUTION INTRAVENOUS ONCE
Status: COMPLETED | OUTPATIENT
Start: 2025-06-27 | End: 2025-06-27

## 2025-06-27 RX ADMIN — DOCETAXEL 124.2 MG: 20 INJECTION, SOLUTION, CONCENTRATE INTRAVENOUS at 09:05

## 2025-06-27 RX ADMIN — SODIUM CHLORIDE 20 ML/HR: 0.9 INJECTION, SOLUTION INTRAVENOUS at 08:15

## 2025-06-27 RX ADMIN — PEGFILGRASTIM 6 MG: KIT SUBCUTANEOUS at 10:09

## 2025-06-27 RX ADMIN — DEXAMETHASONE SODIUM PHOSPHATE: 10 INJECTION, SOLUTION INTRAMUSCULAR; INTRAVENOUS at 08:21

## 2025-06-27 NOTE — PROGRESS NOTES
Paras Pitts  tolerated taxotere infusion well with no complications. Neulasta OnPro applied to left arm - full and blinking green upon discharge. Pt aware to remove device tomorrow (Saturday 6/28) after 2:30PM when injection is complete.    Paras Pitts is aware of future appt on 7/24 at 11AM.     AVS printed and given to Paras Pitts.

## 2025-06-30 RX ORDER — DEXAMETHASONE 4 MG/1
8 TABLET ORAL 2 TIMES DAILY WITH MEALS
Qty: 12 TABLET | Refills: 0 | OUTPATIENT
Start: 2025-06-30

## 2025-07-01 ENCOUNTER — OFFICE VISIT (OUTPATIENT)
Dept: AUDIOLOGY | Facility: CLINIC | Age: 70
End: 2025-07-01

## 2025-07-01 DIAGNOSIS — H90.3 SENSORY HEARING LOSS, BILATERAL: Primary | ICD-10-CM

## 2025-07-01 NOTE — PROGRESS NOTES
Hearing Aid Visit:    Name:  Paras Ptits  :  1955  Age:  70 y.o.  MRN:  [id number]  Date of Evaluation: 25     HISTORY:    Paras Pitts was seen today (2025) for a(n) in-warranty hearing aid check of his bilateral hearing aids. Today, Paras reported his right hearing aid stopped working.    DEVICE INFORMATION:       Left Device Right Device   Hearing Aid Make: Oticon  Oticon    Hearing Aid Model: Intent 3 Intent 3   Serial Number: [id number]   Repair Warranty Date: 28   Loss/Damage Warranty Status: Active  Active         Length/Output 2-85 2-85   Wax System: Pro Wax miniFIT Pro Wax miniFIT   Dome Size/Style:  8mm DB 8mm DB   Battery: Lithium-ion Rechargeable Lithium-ion Rechargeable       [employer]armold Serial Number: N/A N/A   [employer]old Warranty Date:  N/A N/A    Serial Number: N/A   Warranty Date: N/A    Accessories: N/A       ACTION/ADJUSTMENTS:    Replaced wax guard with immediate improvement. Reviewed wax guard and domes.    RECOMMENDATIONS:     Return Sampson Durbin, CCC-A  Clinical Audiologist  [facility]UD[facility] [address] [address]

## 2025-07-14 ENCOUNTER — HOSPITAL ENCOUNTER (OUTPATIENT)
Dept: MRI IMAGING | Facility: HOSPITAL | Age: 70
Discharge: HOME | End: 2025-07-14
Payer: COMMERCIAL

## 2025-07-14 DIAGNOSIS — C79.31 METASTASIS TO BRAIN (HCC): ICD-10-CM

## 2025-07-14 PROCEDURE — 70553 MRI BRAIN STEM W/O & W/DYE: CPT

## 2025-07-14 PROCEDURE — A9585 GADOBUTROL INJECTION: HCPCS | Performed by: STUDENT IN AN ORGANIZED HEALTH CARE EDUCATION/TRAINING PROGRAM

## 2025-07-14 RX ORDER — GADOBUTROL 604.72 MG/ML
10 INJECTION INTRAVENOUS
Status: COMPLETED | OUTPATIENT
Start: 2025-07-14 | End: 2025-07-14

## 2025-07-14 RX ADMIN — GADOBUTROL 10 ML: 604.72 INJECTION INTRAVENOUS at 11:45

## 2025-07-18 RX ORDER — SODIUM CHLORIDE 9 MG/ML
20 INJECTION, SOLUTION INTRAVENOUS ONCE
Status: CANCELLED | OUTPATIENT
Start: 2025-07-25

## 2025-07-22 ENCOUNTER — OFFICE VISIT (OUTPATIENT)
Dept: HEMATOLOGY ONCOLOGY | Facility: CLINIC | Age: 70
End: 2025-07-22
Payer: COMMERCIAL

## 2025-07-22 ENCOUNTER — TELEPHONE (OUTPATIENT)
Dept: HEMATOLOGY ONCOLOGY | Facility: CLINIC | Age: 70
End: 2025-07-22

## 2025-07-22 VITALS
TEMPERATURE: 98.2 F | WEIGHT: 193 LBS | HEART RATE: 67 BPM | DIASTOLIC BLOOD PRESSURE: 78 MMHG | HEIGHT: 69 IN | BODY MASS INDEX: 28.58 KG/M2 | SYSTOLIC BLOOD PRESSURE: 126 MMHG | RESPIRATION RATE: 18 BRPM | OXYGEN SATURATION: 96 %

## 2025-07-22 DIAGNOSIS — C34.91 CARCINOMA OF RIGHT LUNG (HCC): Primary | ICD-10-CM

## 2025-07-22 DIAGNOSIS — C79.31 METASTASIS TO BRAIN (HCC): ICD-10-CM

## 2025-07-22 DIAGNOSIS — D70.1 CHEMOTHERAPY-INDUCED NEUTROPENIA (HCC): ICD-10-CM

## 2025-07-22 DIAGNOSIS — T45.1X5A CHEMOTHERAPY-INDUCED NEUTROPENIA (HCC): ICD-10-CM

## 2025-07-22 PROCEDURE — 99214 OFFICE O/P EST MOD 30 MIN: CPT | Performed by: INTERNAL MEDICINE

## 2025-07-22 NOTE — ASSESSMENT & PLAN NOTE
Non-small cell lung cancer favoring adenocarcinoma, clinical stage JAY at diagnosis (cT3, cN1, cM1b) S/P surgical resection of the oligometastatic brain lesion followed by SRS.  Status post 4 cycles of neoadjuvant chemotherapy carboplatin, Alimta and nivolumab 7/2023.    Status post right upper lobe lobectomy on 9/1/2023. The final pathology was ypT2b, pN2, G3.  R0.     Started on adjuvant immunotherapy with nivolumab 480 mg on every 4-week basis on 10/13/2023.  PET scan in March 2024 showed mediastinal adenopathy compatible with recurrence. This was confirmed with bronchoscopy and EBUS guided biopsy on 4/16/2024 which showed metastatic disease in the 4R, 2R lymph node area.     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     His case was discussed with the radiation oncology team. The decision was made to pursue 4 doses of Avastin to decrease the vasogenic edema in the brain since he was felt to have residual/recurrent disease in the brain according to the MRI from April 2024.   Instead of concurrent chemoradiation the decision was made to get him started on single agent Taxotere.  The patient received a total of 4 cycles of Taxotere followed by a PET scan on 8/5/2024 which showed persistent hypermetabolic right paratracheal mediastinal tri metastatic disease which seems to be stable with mild improvement in size but increased in activity.  The mild splenomegaly and increased diffuse bone marrow activity is most likely reactive.     The patient received 5 cycles of adjusted dose Taxotere 60 mg/m².  He then received radiation to the hypermetabolic area of the right upper lobe/mediastinum which was completed on 10/21/2024.  He then requested a break from treatment which lasted from August 2024 until the middle of February 2025.    We discussed pursuing a PET CT scan prior to his next visit to evaluate the status of his metastatic non-small  cell lung cancer on the current palliative adjusted dose chemotherapy of Taxotere every 4 weeks.  We will then discuss the results findings and act accordingly.        Orders:    NM PET CT skull base to mid thigh; Future

## 2025-07-22 NOTE — ASSESSMENT & PLAN NOTE
He is status post surgical resection of the oligometastatic Brain lesion followed by postoperative RT with SRS/SRT in 5 fractions to avoid local recurrence.    Brain MRI from April 2024 showed residual or recurrence of his disease.  He did receive 4 doses of Avastin to decrease the enhancement or vasogenic edema.    MRI of the brain on 7/14/2025 showed stable size of parenchymal enhancement in the paramedian right frontal lobe with increasing surrounding FLAIR abnormality.  The findings were present most likely posttreatment changes.

## 2025-07-22 NOTE — PROGRESS NOTES
Name: Paras Pitts      : 1955      MRN: [id number]  Encounter Provider: Collin Langford MD  Encounter Date: 2025   Encounter department: [facility] [place]  :  Assessment & Plan  Carcinoma of right lung (HCC)  Non-small cell lung cancer favoring adenocarcinoma, clinical stage JYA at diagnosis (cT3, cN1, cM1b) S/P surgical resection of the oligometastatic brain lesion followed by SRS.  Status post 4 cycles of neoadjuvant chemotherapy carboplatin, Alimta and nivolumab 2023.    Status post right upper lobe lobectomy on 2023. The final pathology was ypT2b, pN2, G3.  R0.     Started on adjuvant immunotherapy with nivolumab 480 mg on every 4-week basis on 10/13/2023.  PET scan in 2024 showed mediastinal adenopathy compatible with recurrence. This was confirmed with bronchoscopy and EBUS guided biopsy on 2024 which showed metastatic disease in the 4R, 2R lymph node area.     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     His case was discussed with the radiation oncology team. The decision was made to pursue 4 doses of Avastin to decrease the vasogenic edema in the brain since he was felt to have residual/recurrent disease in the brain according to the MRI from 2024.   Instead of concurrent chemoradiation the decision was made to get him started on single agent Taxotere.  The patient received a total of 4 cycles of Taxotere followed by a PET scan on 2024 which showed persistent hypermetabolic right paratracheal mediastinal tri metastatic disease which seems to be stable with mild improvement in size but increased in activity.  The mild splenomegaly and increased diffuse bone marrow activity is most likely reactive.     The patient received 5 cycles of adjusted dose Taxotere 60 mg/m².  He then received radiation to the hypermetabolic area of the right upper lobe/mediastinum  which was completed on 10/21/2024.  He then requested a break from treatment which lasted from August 2024 until the middle of February 2025.    We discussed pursuing a PET CT scan prior to his next visit to evaluate the status of his metastatic non-small cell lung cancer on the current palliative adjusted dose chemotherapy of Taxotere every 4 weeks.  We will then discuss the results findings and act accordingly.        Orders:    NM PET CT skull base to mid thigh; Future    Metastasis to brain (HCC)  He is status post surgical resection of the oligometastatic Brain lesion followed by postoperative RT with SRS/SRT in 5 fractions to avoid local recurrence.    Brain MRI from April 2024 showed residual or recurrence of his disease.  He did receive 4 doses of Avastin to decrease the enhancement or vasogenic edema.    MRI of the brain on 7/14/2025 showed stable size of parenchymal enhancement in the paramedian right frontal lobe with increasing surrounding FLAIR abnormality.  The findings were present most likely posttreatment changes.         Chemotherapy-induced neutropenia (HCC)  Neulasta will be used after each treatment to avoid neutropenic complications.            Return in about 6 weeks (around 9/3/2025) for Office Visit 20 min, Labs, Infusion, Imaging.    History of Present Illness   Chief Complaint   Patient presents with    Follow-up   The patient came today for a follow-up visit accompanied by his wife.  He did complain today about fingernail changes which is related to the current chemotherapy.  He had an MRI of the brain on 7/14/2025 which was negative for any obvious hint of progression of his disease in the brain.  Blood work from 6/26/2025 was reviewed which showed normal CBC and CMP.    Oncology History   Cancer Staging   Carcinoma of right lung (HCC)  Staging form: Lung, AJCC 8th Edition  - Clinical: Stage JAY (cT3, cN1, cM1b) - Signed by James Contreras MD on 4/13/2023  - Pathologic stage from  9/1/2023: Stage JAY (pT2b, pN2, cM1b) - Signed by Treva Bah PA-C on 9/20/2023  Histopathologic type: Adenocarcinoma, NOS  Stage prefix: Initial diagnosis  Histologic grade (G): G3  Histologic grading system: 4 grade system  Oncology History   Metastatic adenocarcinoma (HCC)   2023 Initial Diagnosis    Metastatic adenocarcinoma (HCC)     3/23/2023 Biopsy    Brain, right frontal mass (biopsy):  - Metastatic non-small cell carcinoma     Comment:  - Tumor cells stain diffusely for CAM5.2, CKAE1/3, CK7, TTF1 and NapsinA with absent CK20, p63, CK5/6, p40 expression.  This immunopanel favors a metastatic lung adenocarcinoma.       5/18/2023 - 7/20/2023 Chemotherapy    cyanocobalamin, 1,000 mcg, Intramuscular, Once, 3 of 3 cycles  Administration: 1,000 mcg (6/29/2023), 1,000 mcg (5/18/2023), 1,000 mcg (7/20/2023)  alteplase (CATHFLO), 2 mg, Intracatheter, Every 1 Minute as needed, 4 of 4 cycles  pegfilgrastim (NEULASTA ONPRO), 6 mg, Subcutaneous, Once, 3 of 3 cycles  Administration: 6 mg (6/8/2023), 6 mg (6/29/2023), 6 mg (7/20/2023)  fosaprepitant (EMEND) IVPB, 150 mg, Intravenous, Once, 4 of 4 cycles  Administration: 150 mg (5/18/2023), 150 mg (6/29/2023), 150 mg (7/20/2023), 150 mg (6/8/2023)  nivolumab (OPDIVO) IVPB, 360 mg (150 % of original dose 240 mg), Intravenous, Once, 4 of 4 cycles  Dose modification: 360 mg (original dose 240 mg, Cycle 1, Reason: Dose modified as per discussion with consulting physician)  Administration: 360 mg (5/18/2023), 360 mg (6/8/2023), 360 mg (6/29/2023), 360 mg (7/20/2023)  CARBOplatin (PARAPLATIN) IVPB (GOG AUC DOSING), 642.5 mg, Intravenous, Once, 4 of 4 cycles  Administration: 642.5 mg (5/18/2023), 642.5 mg (6/29/2023), 566.5 mg (7/20/2023), 650 mg (6/8/2023)  pemetrexed (ALIMTA) chemo infusion, 980 mg, Intravenous, Once, 4 of 4 cycles  Administration: 1,000 mg (5/18/2023), 1,000 mg (6/29/2023), 1,000 mg (7/20/2023), 1,000 mg (6/8/2023)     10/11/2023 - 10/11/2023 Chemotherapy     alteplase (CATHFLO), 2 mg, Intracatheter, Every 1 Minute as needed, 0 of 6 cycles  nivolumab (OPDIVO) IVPB, 240 mg, Intravenous, Once, 0 of 6 cycles     10/13/2023 - 2/23/2024 Chemotherapy    alteplase (CATHFLO), 2 mg, Intracatheter, Every 1 Minute as needed, 5 of 8 cycles  nivolumab (OPDIVO) IVPB, 480 mg, Intravenous, Once, 5 of 8 cycles  Administration: 480 mg (10/13/2023), 480 mg (11/10/2023), 480 mg (12/8/2023), 480 mg (1/5/2024), 480 mg (2/23/2024)     4/16/2024 Biopsy    ENDOBRONCHIAL ULTRASOUND (EBUS)     A-C. Lymph Node, Level 4R (ThinPrep, smear and cell block preparations):    - Metastatic non-small cell carcinoma, most compatible with lung primary; see note.    - Satisfactory for evaluation.     D-F. Lymph Node, Level 2R (ThinPrep, smear and cell block preparations):    - Metastatic non-small cell carcinoma.    - Non-small cell carcinoma, favor adenocarcinoma.    - Satisfactory for evaluation.     G. Lymph Node, Level 11R:    - Atypical cellular changes seen.    - Rare atypical epithelioid cells in a background of abundant benign bronchial cells.    - Lymphocytes present, compatible with adequate lymph node sampling.     5/15/2024 -  Chemotherapy    bevacizumab (AVASTIN) IVPB, 5 mg/kg = 457.5 mg (100 % of original dose 5 mg/kg), 3 of 3 cycles  Dose modification: 5 mg/kg (original dose 5 mg/kg, Cycle 1), 5 mg/kg (original dose 5 mg/kg, Cycle 1), 5 mg/kg (original dose 5 mg/kg, Cycle 2)  Administration: 457.5 mg (5/15/2024), 457.5 mg (5/30/2024), 457.5 mg (6/13/2024), 457.5 mg (6/27/2024)  DOCEtaxel (TAXOTERE) chemo infusion, 75 mg/m2 = 155.2 mg, 12 of 16 cycles  Dose modification: 60 mg/m2 (original dose 75 mg/m2, Cycle 6, Reason: Dose Not Tolerated)  Administration: 155.2 mg (5/15/2024), 155.2 mg (6/27/2024), 155.2 mg (6/6/2024), 155.2 mg (7/18/2024), 155.2 mg (8/8/2024), 124.2 mg (2/14/2025), 124.2 mg (3/7/2025), 124.2 mg (3/28/2025), 124.2 mg (4/18/2025), 124.2 mg (5/9/2025), 124.2 mg (5/30/2025), 124.2  mg (6/27/2025)     Carcinoma of right lung (HCC)   4/13/2023 Initial Diagnosis    Carcinoma of right lung (HCC)     4/13/2023 -  Cancer Staged    Staging form: Lung, AJCC 8th Edition  - Clinical: Stage JAY (cT3, cN1, cM1b) - Signed by James Contreras MD on 4/13/2023 4/19/2023 - 4/28/2023 Radiation    Plan ID Energy Fractions Dose per Fraction (cGy) Dose Correction (cGy) Total Dose Delivered (cGy) Elapsed Days   SRT R Frontal 6X-FFF 5 / 5 600 0 3,000 9         5/18/2023 - 7/20/2023 Chemotherapy    cyanocobalamin, 1,000 mcg, Intramuscular, Once, 3 of 3 cycles  Administration: 1,000 mcg (6/29/2023), 1,000 mcg (5/18/2023), 1,000 mcg (7/20/2023)  alteplase (CATHFLO), 2 mg, Intracatheter, Every 1 Minute as needed, 4 of 4 cycles  pegfilgrastim (NEULASTA ONPRO), 6 mg, Subcutaneous, Once, 3 of 3 cycles  Administration: 6 mg (6/8/2023), 6 mg (6/29/2023), 6 mg (7/20/2023)  fosaprepitant (EMEND) IVPB, 150 mg, Intravenous, Once, 4 of 4 cycles  Administration: 150 mg (5/18/2023), 150 mg (6/29/2023), 150 mg (7/20/2023), 150 mg (6/8/2023)  nivolumab (OPDIVO) IVPB, 360 mg (150 % of original dose 240 mg), Intravenous, Once, 4 of 4 cycles  Dose modification: 360 mg (original dose 240 mg, Cycle 1, Reason: Dose modified as per discussion with consulting physician)  Administration: 360 mg (5/18/2023), 360 mg (6/8/2023), 360 mg (6/29/2023), 360 mg (7/20/2023)  CARBOplatin (PARAPLATIN) IVPB (GOG AUC DOSING), 642.5 mg, Intravenous, Once, 4 of 4 cycles  Administration: 642.5 mg (5/18/2023), 642.5 mg (6/29/2023), 566.5 mg (7/20/2023), 650 mg (6/8/2023)  pemetrexed (ALIMTA) chemo infusion, 980 mg, Intravenous, Once, 4 of 4 cycles  Administration: 1,000 mg (5/18/2023), 1,000 mg (6/29/2023), 1,000 mg (7/20/2023), 1,000 mg (6/8/2023)     9/1/2023 -  Cancer Staged    Staging form: Lung, AJCC 8th Edition  - Pathologic stage from 9/1/2023: Stage JAY (pT2b, pN2, cM1b) - Signed by Treva Bah PA-C on 9/20/2023  Histopathologic type:  Adenocarcinoma, NOS  Stage prefix: Initial diagnosis  Histologic grade (G): G3  Histologic grading system: 4 grade system       9/1/2023 Surgery    Right robotic converted to open upper lobectomy      9/1/2023 Biopsy    A.  Lung, right upper lobe:     - Invasive poorly differentiated solid adenocarcinoma of the lung, 4.4 cm.     - Tumor invades into, but not through, the visceral pleura (PL1), confirmed by special VVG stains.     - Lymphatic channel invasion by tumor identified.     - Metastatic carcinoma present in three of thirteen lymph nodes (3/13).       -- Rare fibrotic granulomas present.     - All margins are negative for tumor.     - Emphysematous changes.     B.  Lymph node, level 8:     - Negative for malignancy (0/1).     C.  Lymph node, level 7, #1:     - Negative for malignancy (0/1).     D.  Lymph node, level 4R:     - Negative for malignancy (0/1).     E.  Lymph node, level 4R, #2:     - Fibrovascular adipose tissue; negative for malignancy.     - No lymphoid tissue identified.     F.  Lymph node, level 2R, #1:     - Metastatic carcinoma present in one of three lymph nodes (1/3).     G.  Lymph node, level 2R, #2:     - Negative for malignancy (0/1).     H.  Lymph node, level 11 posterior:     - Single lymph node positive for metastatic carcinoma (1/1).     I.  Lymph node, portion of level 12 on artery:     - Negative for malignancy (0/1).     - Non-caseating granulomatous inflammation present.        -- Special stains for acid fast bacilli and fungal organisms are negative.        10/11/2023 - 10/11/2023 Chemotherapy    alteplase (CATHFLO), 2 mg, Intracatheter, Every 1 Minute as needed, 0 of 6 cycles  nivolumab (OPDIVO) IVPB, 240 mg, Intravenous, Once, 0 of 6 cycles     10/13/2023 - 2/23/2024 Chemotherapy    alteplase (CATHFLO), 2 mg, Intracatheter, Every 1 Minute as needed, 5 of 8 cycles  nivolumab (OPDIVO) IVPB, 480 mg, Intravenous, Once, 5 of 8 cycles  Administration: 480 mg (10/13/2023), 480 mg  (11/10/2023), 480 mg (12/8/2023), 480 mg (1/5/2024), 480 mg (2/23/2024)     4/16/2024 Biopsy    ENDOBRONCHIAL ULTRASOUND (EBUS)     A-C. Lymph Node, Level 4R (ThinPrep, smear and cell block preparations):    - Metastatic non-small cell carcinoma, most compatible with lung primary; see note.    - Satisfactory for evaluation.     D-F. Lymph Node, Level 2R (ThinPrep, smear and cell block preparations):    - Metastatic non-small cell carcinoma.    - Non-small cell carcinoma, favor adenocarcinoma.    - Satisfactory for evaluation.     G. Lymph Node, Level 11R:    - Atypical cellular changes seen.    - Rare atypical epithelioid cells in a background of abundant benign bronchial cells.    - Lymphocytes present, compatible with adequate lymph node sampling.     5/15/2024 -  Chemotherapy    bevacizumab (AVASTIN) IVPB, 5 mg/kg = 457.5 mg (100 % of original dose 5 mg/kg), 3 of 3 cycles  Dose modification: 5 mg/kg (original dose 5 mg/kg, Cycle 1), 5 mg/kg (original dose 5 mg/kg, Cycle 1), 5 mg/kg (original dose 5 mg/kg, Cycle 2)  Administration: 457.5 mg (5/15/2024), 457.5 mg (5/30/2024), 457.5 mg (6/13/2024), 457.5 mg (6/27/2024)  DOCEtaxel (TAXOTERE) chemo infusion, 75 mg/m2 = 155.2 mg, 12 of 16 cycles  Dose modification: 60 mg/m2 (original dose 75 mg/m2, Cycle 6, Reason: Dose Not Tolerated)  Administration: 155.2 mg (5/15/2024), 155.2 mg (6/27/2024), 155.2 mg (6/6/2024), 155.2 mg (7/18/2024), 155.2 mg (8/8/2024), 124.2 mg (2/14/2025), 124.2 mg (3/7/2025), 124.2 mg (3/28/2025), 124.2 mg (4/18/2025), 124.2 mg (5/9/2025), 124.2 mg (5/30/2025), 124.2 mg (6/27/2025)     9/9/2024 - 10/21/2024 Radiation    Treatments:  Course: C2    Plan ID Energy Fractions Dose per Fraction (cGy) Dose Correction (cGy) Total Dose Delivered (cGy) Elapsed Days   R LUNG Med 6X 30 / 30 200 0 6,000 42      Treatment Dates:  9/9/2024 - 10/21/2024.      Metastasis to brain (HCC)   4/19/2023 - 4/28/2023 Radiation    Plan ID Energy Fractions Dose per  "Fraction (cGy) Dose Correction (cGy) Total Dose Delivered (cGy) Elapsed Days   SRT R Frontal 6X-FFF 5 / 5 600 0 3,000 9         11/21/2024 Initial Diagnosis    Metastasis to brain (HCC)             Review of Systems   Constitutional:  Positive for fatigue. Negative for chills and fever.   HENT:  Positive for hearing loss. Negative for ear pain and sore throat.    Eyes:  Negative for pain and visual disturbance.   Respiratory:  Positive for cough and shortness of breath.    Cardiovascular:  Negative for chest pain and palpitations.   Gastrointestinal:  Negative for abdominal pain and vomiting.   Genitourinary:  Negative for dysuria and hematuria.   Musculoskeletal:  Negative for arthralgias and back pain.   Skin:  Negative for color change and rash.   Neurological:  Positive for dizziness and numbness. Negative for seizures and syncope.   Psychiatric/Behavioral:  Positive for sleep disturbance.    All other systems reviewed and are negative.    Medical History Reviewed by provider this encounter:  Tobacco  Allergies  Meds  Problems  Med Hx  Surg Hx  Fam Hx     .  Medications Ordered Prior to Encounter[1]   Social History[2]      Objective   /78 (BP Location: Right arm, Patient Position: Sitting, Cuff Size: Adult)   Pulse 67   Temp 98.2 °F (36.8 °C)   Resp 18   Ht 5' 9\" (1.753 m)   Wt 87.5 kg (193 lb)   SpO2 96%   BMI 28.50 kg/m²     Pain Screening:  Pain Score: 0-No pain  ECOG   3  Physical Exam  Constitutional:       Appearance: He is well-developed.   HENT:      Head: Normocephalic and atraumatic.      Nose: Nose normal.     Eyes:      General: No scleral icterus.        Right eye: No discharge.         Left eye: No discharge.      Conjunctiva/sclera: Conjunctivae normal.      Pupils: Pupils are equal, round, and reactive to light.     Neck:      Thyroid: No thyromegaly.      Trachea: No tracheal deviation.     Cardiovascular:      Rate and Rhythm: Normal rate and regular rhythm.      Heart " sounds: Normal heart sounds. No murmur heard.     No friction rub.   Pulmonary:      Effort: Pulmonary effort is normal. No respiratory distress.      Breath sounds: Normal breath sounds. No wheezing or rales.   Chest:      Chest wall: No tenderness.   Abdominal:      General: Bowel sounds are normal. There is no distension.      Palpations: Abdomen is soft. There is no hepatomegaly or splenomegaly.      Tenderness: There is no abdominal tenderness. There is no guarding or rebound.     Musculoskeletal:         General: No tenderness or deformity. Normal range of motion.      Cervical back: Normal range of motion and neck supple.   Lymphadenopathy:      Cervical: No cervical adenopathy.     Skin:     General: Skin is warm and dry.      Coloration: Skin is not pale.      Findings: No erythema or rash.     Neurological:      Mental Status: He is alert and oriented to person, place, and time.      Cranial Nerves: No cranial nerve deficit.      Coordination: Coordination normal.      Deep Tendon Reflexes: Reflexes are normal and symmetric.     Psychiatric:         Behavior: Behavior normal.         Thought Content: Thought content normal.         Judgment: Judgment normal.         Labs: I have reviewed the following labs:  Lab Results   Component Value Date/Time    WBC 8.67 06/26/2025 02:16 PM    RBC 4.06 06/26/2025 02:16 PM    Hemoglobin 12.3 06/26/2025 02:16 PM    Hematocrit 39.1 06/26/2025 02:16 PM    MCV 96 06/26/2025 02:16 PM    MCH 30.3 06/26/2025 02:16 PM    RDW 15.1 06/26/2025 02:16 PM    Platelets 228 06/26/2025 02:16 PM    Segmented % 70 06/26/2025 02:16 PM    Lymphocytes % 15 06/26/2025 02:16 PM    Monocytes % 6 06/26/2025 02:16 PM    Eosinophils Relative 7 (H) 06/26/2025 02:16 PM    Basophils Relative 1 06/26/2025 02:16 PM    Immature Grans % 1 06/26/2025 02:16 PM    Absolute Neutrophils 6.14 06/26/2025 02:16 PM     Lab Results   Component Value Date/Time    Potassium 4.0 06/26/2025 02:16 PM    Chloride 108  "06/26/2025 02:16 PM    CO2 28 06/26/2025 02:16 PM    BUN 15 06/26/2025 02:16 PM    Creatinine 0.75 06/26/2025 02:16 PM    Glucose, Fasting 107 (H) 12/12/2024 08:31 AM    Calcium 8.8 06/26/2025 02:16 PM    AST 19 06/26/2025 02:16 PM    ALT 12 06/26/2025 02:16 PM    Alkaline Phosphatase 70 06/26/2025 02:16 PM    Total Protein 6.5 06/26/2025 02:16 PM    Albumin 4.3 06/26/2025 02:16 PM    Total Bilirubin 0.60 06/26/2025 02:16 PM    eGFR 91 06/26/2025 02:16 PM     Lab Results   Component Value Date/Time    WBC 8.67 06/26/2025 02:16 PM    RBC 4.06 06/26/2025 02:16 PM    Hemoglobin 12.3 06/26/2025 02:16 PM    Hematocrit 39.1 06/26/2025 02:16 PM    MCV 96 06/26/2025 02:16 PM    MCH 30.3 06/26/2025 02:16 PM    RDW 15.1 06/26/2025 02:16 PM    Platelets 228 06/26/2025 02:16 PM    Segmented % 70 06/26/2025 02:16 PM    Lymphocytes % 15 06/26/2025 02:16 PM    Monocytes % 6 06/26/2025 02:16 PM    Eosinophils Relative 7 (H) 06/26/2025 02:16 PM    Basophils Relative 1 06/26/2025 02:16 PM    Immature Grans % 1 06/26/2025 02:16 PM    Absolute Neutrophils 6.14 06/26/2025 02:16 PM      Lab Results   Component Value Date/Time    Sodium 143 06/26/2025 02:16 PM    Potassium 4.0 06/26/2025 02:16 PM    Chloride 108 06/26/2025 02:16 PM    CO2 28 06/26/2025 02:16 PM    ANION GAP 7 06/26/2025 02:16 PM    BUN 15 06/26/2025 02:16 PM    Creatinine 0.75 06/26/2025 02:16 PM    Glucose 119 06/26/2025 02:16 PM    Glucose, Fasting 107 (H) 12/12/2024 08:31 AM    Calcium 8.8 06/26/2025 02:16 PM    AST 19 06/26/2025 02:16 PM    ALT 12 06/26/2025 02:16 PM    Alkaline Phosphatase 70 06/26/2025 02:16 PM    Total Protein 6.5 06/26/2025 02:16 PM    Albumin 4.3 06/26/2025 02:16 PM    Total Bilirubin 0.60 06/26/2025 02:16 PM    eGFR 91 06/26/2025 02:16 PM      No results found for: \"IRON\", \"CONCFE\", \"FERRITIN\", \"NDMQVJFQ19\", \"FOLATE\", \"COPPER\", \"EPOREFLAB\", \"ERYTHROPRO\", \"ESR\", \"CRP\", \"HIVAGAB\", \"HEPATITIS\"   Results for orders placed or performed during the " hospital encounter of 06/26/25   CBC and differential   Result Value Ref Range    WBC 8.67 4.31 - 10.16 Thousand/uL    RBC 4.06 3.88 - 5.62 Million/uL    Hemoglobin 12.3 12.0 - 17.0 g/dL    Hematocrit 39.1 36.5 - 49.3 %    MCV 96 82 - 98 fL    MCH 30.3 26.8 - 34.3 pg    MCHC 31.5 31.4 - 37.4 g/dL    RDW 15.1 11.6 - 15.1 %    MPV 9.8 8.9 - 12.7 fL    Platelets 228 149 - 390 Thousands/uL    nRBC 0 /100 WBCs    Segmented % 70 43 - 75 %    Immature Grans % 1 0 - 2 %    Lymphocytes % 15 14 - 44 %    Monocytes % 6 4 - 12 %    Eosinophils Relative 7 (H) 0 - 6 %    Basophils Relative 1 0 - 1 %    Absolute Neutrophils 6.14 1.85 - 7.62 Thousands/µL    Absolute Immature Grans 0.05 0.00 - 0.20 Thousand/uL    Absolute Lymphocytes 1.32 0.60 - 4.47 Thousands/µL    Absolute Monocytes 0.48 0.17 - 1.22 Thousand/µL    Eosinophils Absolute 0.58 0.00 - 0.61 Thousand/µL    Basophils Absolute 0.10 0.00 - 0.10 Thousands/µL   Comprehensive metabolic panel   Result Value Ref Range    Sodium 143 135 - 147 mmol/L    Potassium 4.0 3.5 - 5.3 mmol/L    Chloride 108 96 - 108 mmol/L    CO2 28 21 - 32 mmol/L    ANION GAP 7 4 - 13 mmol/L    BUN 15 5 - 25 mg/dL    Creatinine 0.75 0.60 - 1.30 mg/dL    Glucose 119 65 - 140 mg/dL    Calcium 8.8 8.4 - 10.2 mg/dL    AST 19 13 - 39 U/L    ALT 12 7 - 52 U/L    Alkaline Phosphatase 70 34 - 104 U/L    Total Protein 6.5 6.4 - 8.4 g/dL    Albumin 4.3 3.5 - 5.0 g/dL    Total Bilirubin 0.60 0.20 - 1.00 mg/dL    eGFR 91 ml/min/1.73sq m   Result Value Ref Range    Magnesium 1.9 1.9 - 2.7 mg/dL     *Note: Due to a large number of results and/or encounters for the requested time period, some results have not been displayed. A complete set of results can be found in Results Review.                   [1]   Current Outpatient Medications on File Prior to Visit   Medication Sig Dispense Refill    acetaminophen (TYLENOL) 325 mg tablet Take 2 tablets (650 mg total) by mouth every 6 (six) hours as needed for mild pain or  fever      amLODIPine (NORVASC) 10 mg tablet Take 1 tablet (10 mg total) by mouth daily 30 tablet 5    amoxicillin (AMOXIL) 500 mg capsule       atorvastatin (LIPITOR) 40 mg tablet Take 1 tablet (40 mg total) by mouth daily after dinner 30 tablet 5    BOSWELLIA DANYA PO Take 4 g by mouth in the morning      dexamethasone (DECADRON) 4 mg tablet Take 2 tablets 2 times a day with meals Day before, Day of, and Day after chemotherapy. 12 tablet 0    lacosamide (VIMPAT) 200 mg tablet Take 1 tablet (200 mg total) by mouth every 12 (twelve) hours 60 tablet 5    levETIRAcetam (Keppra) 1000 MG tablet Take 2 tablets (2,000 mg total) by mouth 2 (two) times a day 120 tablet 11    loperamide (IMODIUM A-D) 2 MG tablet Take 2 mg by mouth as needed in the morning and 2 mg as needed at noon and 2 mg as needed in the evening and 2 mg as needed before bedtime for diarrhea.      losartan (COZAAR) 50 mg tablet Take 1 tablet (50 mg total) by mouth daily 30 tablet 5    Turmeric (QC TUMERIC COMPLEX PO) Take by mouth in the morning       No current facility-administered medications on file prior to visit.   [2]   Social History  Tobacco Use    Smoking status: Former     Current packs/day: 0.00     Average packs/day: 1 pack/day for 15.0 years (15.0 ttl pk-yrs)     Types: Cigarettes     Start date:      Quit date:      Years since quittin.5     Passive exposure: Never    Smokeless tobacco: Never    Tobacco comments:     i quit when i was 28. not sure of exact dates   Vaping Use    Vaping status: Never Used   Substance and Sexual Activity    Alcohol use: Not Currently     Alcohol/week: 6.0 standard drinks of alcohol     Types: 6 Cans of beer per week     Comment: socially    Drug use: Yes     Types: Marijuana     Comment: gummies for nausea with chemo    Sexual activity: Yes     Partners: Female     Birth control/protection: None

## 2025-07-24 ENCOUNTER — HOSPITAL ENCOUNTER (OUTPATIENT)
Dept: INFUSION CENTER | Facility: HOSPITAL | Age: 70
End: 2025-07-24
Attending: INTERNAL MEDICINE
Payer: COMMERCIAL

## 2025-07-24 ENCOUNTER — OFFICE VISIT (OUTPATIENT)
Dept: RADIATION ONCOLOGY | Facility: HOSPITAL | Age: 70
End: 2025-07-24
Attending: STUDENT IN AN ORGANIZED HEALTH CARE EDUCATION/TRAINING PROGRAM
Payer: COMMERCIAL

## 2025-07-24 VITALS
RESPIRATION RATE: 18 BRPM | OXYGEN SATURATION: 98 % | TEMPERATURE: 97.6 F | WEIGHT: 193 LBS | HEART RATE: 73 BPM | BODY MASS INDEX: 28.58 KG/M2 | HEIGHT: 69 IN | SYSTOLIC BLOOD PRESSURE: 118 MMHG | DIASTOLIC BLOOD PRESSURE: 70 MMHG

## 2025-07-24 DIAGNOSIS — C34.91 CARCINOMA OF RIGHT LUNG (HCC): ICD-10-CM

## 2025-07-24 DIAGNOSIS — Z45.2 ENCOUNTER FOR CENTRAL LINE CARE: Primary | ICD-10-CM

## 2025-07-24 DIAGNOSIS — D70.1 CHEMOTHERAPY-INDUCED NEUTROPENIA (HCC): ICD-10-CM

## 2025-07-24 DIAGNOSIS — C79.31 METASTASIS TO BRAIN (HCC): Primary | ICD-10-CM

## 2025-07-24 DIAGNOSIS — C79.9 METASTATIC ADENOCARCINOMA (HCC): ICD-10-CM

## 2025-07-24 DIAGNOSIS — T45.1X5A CHEMOTHERAPY-INDUCED NEUTROPENIA (HCC): ICD-10-CM

## 2025-07-24 LAB
ALBUMIN SERPL BCG-MCNC: 4.3 G/DL (ref 3.5–5)
ALP SERPL-CCNC: 62 U/L (ref 34–104)
ALT SERPL W P-5'-P-CCNC: 10 U/L (ref 7–52)
ANION GAP SERPL CALCULATED.3IONS-SCNC: 6 MMOL/L (ref 4–13)
AST SERPL W P-5'-P-CCNC: 16 U/L (ref 13–39)
BASOPHILS # BLD AUTO: 0.09 THOUSANDS/ÂΜL (ref 0–0.1)
BASOPHILS NFR BLD AUTO: 1 % (ref 0–1)
BILIRUB SERPL-MCNC: 0.68 MG/DL (ref 0.2–1)
BUN SERPL-MCNC: 15 MG/DL (ref 5–25)
CALCIUM SERPL-MCNC: 9 MG/DL (ref 8.4–10.2)
CHLORIDE SERPL-SCNC: 105 MMOL/L (ref 96–108)
CO2 SERPL-SCNC: 29 MMOL/L (ref 21–32)
CREAT SERPL-MCNC: 0.82 MG/DL (ref 0.6–1.3)
EOSINOPHIL # BLD AUTO: 0.67 THOUSAND/ÂΜL (ref 0–0.61)
EOSINOPHIL NFR BLD AUTO: 9 % (ref 0–6)
ERYTHROCYTE [DISTWIDTH] IN BLOOD BY AUTOMATED COUNT: 14.4 % (ref 11.6–15.1)
GFR SERPL CREATININE-BSD FRML MDRD: 88 ML/MIN/1.73SQ M
GLUCOSE SERPL-MCNC: 146 MG/DL (ref 65–140)
HCT VFR BLD AUTO: 40.4 % (ref 36.5–49.3)
HGB BLD-MCNC: 13.4 G/DL (ref 12–17)
IMM GRANULOCYTES # BLD AUTO: 0.02 THOUSAND/UL (ref 0–0.2)
IMM GRANULOCYTES NFR BLD AUTO: 0 % (ref 0–2)
LYMPHOCYTES # BLD AUTO: 1.04 THOUSANDS/ÂΜL (ref 0.6–4.47)
LYMPHOCYTES NFR BLD AUTO: 14 % (ref 14–44)
MAGNESIUM SERPL-MCNC: 2 MG/DL (ref 1.9–2.7)
MCH RBC QN AUTO: 31.5 PG (ref 26.8–34.3)
MCHC RBC AUTO-ENTMCNC: 33.2 G/DL (ref 31.4–37.4)
MCV RBC AUTO: 95 FL (ref 82–98)
MONOCYTES # BLD AUTO: 0.39 THOUSAND/ÂΜL (ref 0.17–1.22)
MONOCYTES NFR BLD AUTO: 5 % (ref 4–12)
NEUTROPHILS # BLD AUTO: 5.14 THOUSANDS/ÂΜL (ref 1.85–7.62)
NEUTS SEG NFR BLD AUTO: 71 % (ref 43–75)
NRBC BLD AUTO-RTO: 0 /100 WBCS
PLATELET # BLD AUTO: 228 THOUSANDS/UL (ref 149–390)
PMV BLD AUTO: 10.7 FL (ref 8.9–12.7)
POTASSIUM SERPL-SCNC: 3.9 MMOL/L (ref 3.5–5.3)
PROT SERPL-MCNC: 6.6 G/DL (ref 6.4–8.4)
RBC # BLD AUTO: 4.25 MILLION/UL (ref 3.88–5.62)
SODIUM SERPL-SCNC: 140 MMOL/L (ref 135–147)
WBC # BLD AUTO: 7.35 THOUSAND/UL (ref 4.31–10.16)

## 2025-07-24 PROCEDURE — 80053 COMPREHEN METABOLIC PANEL: CPT | Performed by: INTERNAL MEDICINE

## 2025-07-24 PROCEDURE — 85025 COMPLETE CBC W/AUTO DIFF WBC: CPT | Performed by: INTERNAL MEDICINE

## 2025-07-24 PROCEDURE — 83735 ASSAY OF MAGNESIUM: CPT | Performed by: INTERNAL MEDICINE

## 2025-07-24 PROCEDURE — 99213 OFFICE O/P EST LOW 20 MIN: CPT | Performed by: STUDENT IN AN ORGANIZED HEALTH CARE EDUCATION/TRAINING PROGRAM

## 2025-07-24 NOTE — PROGRESS NOTES
Paras Pitts  tolerated routine lab draw treatment well with no complications.      Parsa Pitts is aware of future appt tomorrow     AVS printed and given to Paras Pitts:  No (Declined by Paras Pitts)

## 2025-07-24 NOTE — PROGRESS NOTES
Paras Pitts 1955 is a 70 y.o. male  with Stage JAY ([id number]) lung adenocarcinoma s/p resection of a right frontal metastasis followed by SRT. He thereafter underwent right sided lobectomy and MLNDx with pathology demonstrating negative margins, 5/22 LNs involved with SARAHY. He was thereafter continued on immunotherapy until he developed mediastinal POD. On 10/21/24 he completed a course of RT to a dose of 6000cGy in 30 fractions. The pt was last seen in radiation on 25. He returns today for his follow up.    25 - CT chest abdomen pelvis w contrast  IMPRESSION:  1. Findings suggesting progression of disease. Continued interval enlargement of pulmonary metastatic lesions. Slight interval enlargement of a right level 4 lymph node. Interval decrease in size of a solitary enlarged mediastinal lymph node. No new   lymphadenopathy. No intra-abdominal metastatic disease.  2. Stable hepatosplenomegaly with hepatic steatosis    25 - Hem Onc, Habib  Most recent CT showed mild progression of disease  Pt to continue with current line pf palliative chemotherapy     25 - MRI Brain BT w wo contrast  IMPRESSION:  Stable size of parenchymal enhancement in the paramedian right frontal lobe with increasing surrounding FLAIR abnormality. No evidence of increased cerebral blood volume. Findings likely represent posttreatment changes. Continued imaging follow-up   recommended. No new enhancing lesions are seen.    Upcomin25 - NM PET CT skull base to mid thigh  25 - Hem Onc, Habib      Follow up visit     Oncology History   Metastatic adenocarcinoma (HCC)    Initial Diagnosis    Metastatic adenocarcinoma (HCC)     3/23/2023 Biopsy    Brain, right frontal mass (biopsy):  - Metastatic non-small cell carcinoma     Comment:  - Tumor cells stain diffusely for CAM5.2, CKAE1/3, CK7, TTF1 and NapsinA with absent CK20, p63, CK5/6, p40 expression.  This immunopanel favors a metastatic lung  adenocarcinoma.       5/18/2023 - 7/20/2023 Chemotherapy    cyanocobalamin, 1,000 mcg, Intramuscular, Once, 3 of 3 cycles  Administration: 1,000 mcg (6/29/2023), 1,000 mcg (5/18/2023), 1,000 mcg (7/20/2023)  alteplase (CATHFLO), 2 mg, Intracatheter, Every 1 Minute as needed, 4 of 4 cycles  pegfilgrastim (NEULASTA ONPRO), 6 mg, Subcutaneous, Once, 3 of 3 cycles  Administration: 6 mg (6/8/2023), 6 mg (6/29/2023), 6 mg (7/20/2023)  fosaprepitant (EMEND) IVPB, 150 mg, Intravenous, Once, 4 of 4 cycles  Administration: 150 mg (5/18/2023), 150 mg (6/29/2023), 150 mg (7/20/2023), 150 mg (6/8/2023)  nivolumab (OPDIVO) IVPB, 360 mg (150 % of original dose 240 mg), Intravenous, Once, 4 of 4 cycles  Dose modification: 360 mg (original dose 240 mg, Cycle 1, Reason: Dose modified as per discussion with consulting physician)  Administration: 360 mg (5/18/2023), 360 mg (6/8/2023), 360 mg (6/29/2023), 360 mg (7/20/2023)  CARBOplatin (PARAPLATIN) IVPB (GO AUC DOSING), 642.5 mg, Intravenous, Once, 4 of 4 cycles  Administration: 642.5 mg (5/18/2023), 642.5 mg (6/29/2023), 566.5 mg (7/20/2023), 650 mg (6/8/2023)  pemetrexed (ALIMTA) chemo infusion, 980 mg, Intravenous, Once, 4 of 4 cycles  Administration: 1,000 mg (5/18/2023), 1,000 mg (6/29/2023), 1,000 mg (7/20/2023), 1,000 mg (6/8/2023)     10/11/2023 - 10/11/2023 Chemotherapy    alteplase (CATHFLO), 2 mg, Intracatheter, Every 1 Minute as needed, 0 of 6 cycles  nivolumab (OPDIVO) IVPB, 240 mg, Intravenous, Once, 0 of 6 cycles     10/13/2023 - 2/23/2024 Chemotherapy    alteplase (CATHFLO), 2 mg, Intracatheter, Every 1 Minute as needed, 5 of 8 cycles  nivolumab (OPDIVO) IVPB, 480 mg, Intravenous, Once, 5 of 8 cycles  Administration: 480 mg (10/13/2023), 480 mg (11/10/2023), 480 mg (12/8/2023), 480 mg (1/5/2024), 480 mg (2/23/2024)     4/16/2024 Biopsy    ENDOBRONCHIAL ULTRASOUND (EBUS)     A-C. Lymph Node, Level 4R (ThinPrep, smear and cell block preparations):    - Metastatic  non-small cell carcinoma, most compatible with lung primary; see note.    - Satisfactory for evaluation.     D-F. Lymph Node, Level 2R (ThinPrep, smear and cell block preparations):    - Metastatic non-small cell carcinoma.    - Non-small cell carcinoma, favor adenocarcinoma.    - Satisfactory for evaluation.     G. Lymph Node, Level 11R:    - Atypical cellular changes seen.    - Rare atypical epithelioid cells in a background of abundant benign bronchial cells.    - Lymphocytes present, compatible with adequate lymph node sampling.     5/15/2024 -  Chemotherapy    bevacizumab (AVASTIN) IVPB, 5 mg/kg = 457.5 mg (100 % of original dose 5 mg/kg), 3 of 3 cycles  Dose modification: 5 mg/kg (original dose 5 mg/kg, Cycle 1), 5 mg/kg (original dose 5 mg/kg, Cycle 1), 5 mg/kg (original dose 5 mg/kg, Cycle 2)  Administration: 457.5 mg (5/15/2024), 457.5 mg (5/30/2024), 457.5 mg (6/13/2024), 457.5 mg (6/27/2024)  DOCEtaxel (TAXOTERE) chemo infusion, 75 mg/m2 = 155.2 mg, 12 of 16 cycles  Dose modification: 60 mg/m2 (original dose 75 mg/m2, Cycle 6, Reason: Dose Not Tolerated)  Administration: 155.2 mg (5/15/2024), 155.2 mg (6/27/2024), 155.2 mg (6/6/2024), 155.2 mg (7/18/2024), 155.2 mg (8/8/2024), 124.2 mg (2/14/2025), 124.2 mg (3/7/2025), 124.2 mg (3/28/2025), 124.2 mg (4/18/2025), 124.2 mg (5/9/2025), 124.2 mg (5/30/2025), 124.2 mg (6/27/2025)     Carcinoma of right lung (HCC)   4/13/2023 Initial Diagnosis    Carcinoma of right lung (HCC)     4/13/2023 -  Cancer Staged    Staging form: Lung, AJCC 8th Edition  - Clinical: Stage JAY (cT3, cN1, cM1b) - Signed by James Contreras MD on 4/13/2023 4/19/2023 - 4/28/2023 Radiation    Plan ID Energy Fractions Dose per Fraction (cGy) Dose Correction (cGy) Total Dose Delivered (cGy) Elapsed Days   SRT R Frontal 6X-FFF 5 / 5 600 0 3,000 9         5/18/2023 - 7/20/2023 Chemotherapy    cyanocobalamin, 1,000 mcg, Intramuscular, Once, 3 of 3 cycles  Administration: 1,000 mcg  (6/29/2023), 1,000 mcg (5/18/2023), 1,000 mcg (7/20/2023)  alteplase (CATHFLO), 2 mg, Intracatheter, Every 1 Minute as needed, 4 of 4 cycles  pegfilgrastim (NEULASTA ONPRO), 6 mg, Subcutaneous, Once, 3 of 3 cycles  Administration: 6 mg (6/8/2023), 6 mg (6/29/2023), 6 mg (7/20/2023)  fosaprepitant (EMEND) IVPB, 150 mg, Intravenous, Once, 4 of 4 cycles  Administration: 150 mg (5/18/2023), 150 mg (6/29/2023), 150 mg (7/20/2023), 150 mg (6/8/2023)  nivolumab (OPDIVO) IVPB, 360 mg (150 % of original dose 240 mg), Intravenous, Once, 4 of 4 cycles  Dose modification: 360 mg (original dose 240 mg, Cycle 1, Reason: Dose modified as per discussion with consulting physician)  Administration: 360 mg (5/18/2023), 360 mg (6/8/2023), 360 mg (6/29/2023), 360 mg (7/20/2023)  CARBOplatin (PARAPLATIN) IVPB (GOG AUC DOSING), 642.5 mg, Intravenous, Once, 4 of 4 cycles  Administration: 642.5 mg (5/18/2023), 642.5 mg (6/29/2023), 566.5 mg (7/20/2023), 650 mg (6/8/2023)  pemetrexed (ALIMTA) chemo infusion, 980 mg, Intravenous, Once, 4 of 4 cycles  Administration: 1,000 mg (5/18/2023), 1,000 mg (6/29/2023), 1,000 mg (7/20/2023), 1,000 mg (6/8/2023)     9/1/2023 -  Cancer Staged    Staging form: Lung, AJCC 8th Edition  - Pathologic stage from 9/1/2023: Stage JAY (pT2b, pN2, cM1b) - Signed by Treva Bah PA-C on 9/20/2023  Histopathologic type: Adenocarcinoma, NOS  Stage prefix: Initial diagnosis  Histologic grade (G): G3  Histologic grading system: 4 grade system       9/1/2023 Surgery    Right robotic converted to open upper lobectomy      9/1/2023 Biopsy    A.  Lung, right upper lobe:     - Invasive poorly differentiated solid adenocarcinoma of the lung, 4.4 cm.     - Tumor invades into, but not through, the visceral pleura (PL1), confirmed by special VVG stains.     - Lymphatic channel invasion by tumor identified.     - Metastatic carcinoma present in three of thirteen lymph nodes (3/13).       -- Rare fibrotic granulomas present.      - All margins are negative for tumor.     - Emphysematous changes.     B.  Lymph node, level 8:     - Negative for malignancy (0/1).     C.  Lymph node, level 7, #1:     - Negative for malignancy (0/1).     D.  Lymph node, level 4R:     - Negative for malignancy (0/1).     E.  Lymph node, level 4R, #2:     - Fibrovascular adipose tissue; negative for malignancy.     - No lymphoid tissue identified.     F.  Lymph node, level 2R, #1:     - Metastatic carcinoma present in one of three lymph nodes (1/3).     G.  Lymph node, level 2R, #2:     - Negative for malignancy (0/1).     H.  Lymph node, level 11 posterior:     - Single lymph node positive for metastatic carcinoma (1/1).     I.  Lymph node, portion of level 12 on artery:     - Negative for malignancy (0/1).     - Non-caseating granulomatous inflammation present.        -- Special stains for acid fast bacilli and fungal organisms are negative.        10/11/2023 - 10/11/2023 Chemotherapy    alteplase (CATHFLO), 2 mg, Intracatheter, Every 1 Minute as needed, 0 of 6 cycles  nivolumab (OPDIVO) IVPB, 240 mg, Intravenous, Once, 0 of 6 cycles     10/13/2023 - 2/23/2024 Chemotherapy    alteplase (CATHFLO), 2 mg, Intracatheter, Every 1 Minute as needed, 5 of 8 cycles  nivolumab (OPDIVO) IVPB, 480 mg, Intravenous, Once, 5 of 8 cycles  Administration: 480 mg (10/13/2023), 480 mg (11/10/2023), 480 mg (12/8/2023), 480 mg (1/5/2024), 480 mg (2/23/2024)     4/16/2024 Biopsy    ENDOBRONCHIAL ULTRASOUND (EBUS)     A-C. Lymph Node, Level 4R (ThinPrep, smear and cell block preparations):    - Metastatic non-small cell carcinoma, most compatible with lung primary; see note.    - Satisfactory for evaluation.     D-F. Lymph Node, Level 2R (ThinPrep, smear and cell block preparations):    - Metastatic non-small cell carcinoma.    - Non-small cell carcinoma, favor adenocarcinoma.    - Satisfactory for evaluation.     G. Lymph Node, Level 11R:    - Atypical cellular changes seen.    - Rare  atypical epithelioid cells in a background of abundant benign bronchial cells.    - Lymphocytes present, compatible with adequate lymph node sampling.     5/15/2024 -  Chemotherapy    bevacizumab (AVASTIN) IVPB, 5 mg/kg = 457.5 mg (100 % of original dose 5 mg/kg), 3 of 3 cycles  Dose modification: 5 mg/kg (original dose 5 mg/kg, Cycle 1), 5 mg/kg (original dose 5 mg/kg, Cycle 1), 5 mg/kg (original dose 5 mg/kg, Cycle 2)  Administration: 457.5 mg (5/15/2024), 457.5 mg (5/30/2024), 457.5 mg (6/13/2024), 457.5 mg (6/27/2024)  DOCEtaxel (TAXOTERE) chemo infusion, 75 mg/m2 = 155.2 mg, 12 of 16 cycles  Dose modification: 60 mg/m2 (original dose 75 mg/m2, Cycle 6, Reason: Dose Not Tolerated)  Administration: 155.2 mg (5/15/2024), 155.2 mg (6/27/2024), 155.2 mg (6/6/2024), 155.2 mg (7/18/2024), 155.2 mg (8/8/2024), 124.2 mg (2/14/2025), 124.2 mg (3/7/2025), 124.2 mg (3/28/2025), 124.2 mg (4/18/2025), 124.2 mg (5/9/2025), 124.2 mg (5/30/2025), 124.2 mg (6/27/2025)     9/9/2024 - 10/21/2024 Radiation    Treatments:  Course: C2    Plan ID Energy Fractions Dose per Fraction (cGy) Dose Correction (cGy) Total Dose Delivered (cGy) Elapsed Days   R LUNG Med 6X 30 / 30 200 0 6,000 42      Treatment Dates:  9/9/2024 - 10/21/2024.      Metastasis to brain (HCC)   4/19/2023 - 4/28/2023 Radiation    Plan ID Energy Fractions Dose per Fraction (cGy) Dose Correction (cGy) Total Dose Delivered (cGy) Elapsed Days   SRT R Frontal 6X-FFF 5 / 5 600 0 3,000 9         11/21/2024 Initial Diagnosis    Metastasis to brain (HCC)         Review of Systems:  Review of Systems   Constitutional: Negative.    HENT: Negative.     Eyes: Negative.         Wears glasses  Eyes are weepy    Respiratory:  Positive for choking and shortness of breath.    Cardiovascular: Negative.    Gastrointestinal: Negative.    Endocrine: Negative.    Genitourinary: Negative.    Musculoskeletal:  Positive for back pain and gait problem (using a cane).   Skin: Negative.     Allergic/Immunologic: Negative.    Neurological:  Positive for tremors (left hand) and light-headedness.   Hematological: Negative.    Psychiatric/Behavioral: Negative.           Health Maintenance   Topic Date Due    RSV Vaccine for Pregnant Patients and Patients Age 60+ Years (1 - Risk 60-74 years 1-dose series) Never done    Falls: Plan of Care  Never done    BMI: Followup Plan  10/20/2024    COVID-19 Vaccine (4 - Pfizer risk - season) 2025    Influenza Vaccine (1) 2025    Fall Risk  2025    Annual Physical  2025    Depression Screening  2026    BMI: Adult  2026    DTaP,Tdap,and Td Vaccines (2 - Td or Tdap) 2028    Colorectal Cancer Screening  08/10/2031    Hepatitis C Screening  Completed    Zoster Vaccine  Completed    Pneumococcal Vaccine: 50+ Years  Completed    Meningococcal B Vaccine  Aged Out    RSV Vaccine age 0-20 Months  Aged Out    HIB Vaccine  Aged Out    IPV Vaccine  Aged Out    Hepatitis A Vaccine  Aged Out    Meningococcal ACWY Vaccine  Aged Out    HPV Vaccine  Aged Out     Problem List[1]  Past Medical History[2]  Past Surgical History[3]  Family History[4]  Social History     Socioeconomic History    Marital status: /Civil Union     Spouse name: Not on file    Number of children: Not on file    Years of education: Not on file    Highest education level: Not on file   Occupational History    Not on file   Tobacco Use    Smoking status: Former     Current packs/day: 0.00     Average packs/day: 1 pack/day for 15.0 years (15.0 ttl pk-yrs)     Types: Cigarettes     Start date:      Quit date:      Years since quittin.5     Passive exposure: Never    Smokeless tobacco: Never    Tobacco comments:     i quit when i was 28. not sure of exact dates   Vaping Use    Vaping status: Never Used   Substance and Sexual Activity    Alcohol use: Not Currently     Alcohol/week: 6.0 standard drinks of alcohol     Types: 6 Cans of beer per week      Comment: socially    Drug use: Yes     Types: Marijuana     Comment: gummies for nausea with chemo    Sexual activity: Yes     Partners: Female     Birth control/protection: None   Other Topics Concern    Not on file   Social History Narrative    Not on file     Social Drivers of Health     Financial Resource Strain: Not on file   Food Insecurity: No Food Insecurity (5/24/2024)    Nursing - Inadequate Food Risk Classification     Worried About Running Out of Food in the Last Year: Never true     Ran Out of Food in the Last Year: Never true     Ran Out of Food in the Last Year: Not on file   Transportation Needs: No Transportation Needs (6/21/2024)    OASIS : Transportation     Lack of Transportation (Medical): No     Lack of Transportation (Non-Medical): No     Patient Unable or Declines to Respond: No   Physical Activity: Not on file   Stress: Not on file   Social Connections: Not on file   Intimate Partner Violence: Not on file   Housing Stability: Unknown (5/24/2024)    Housing Stability Vital Sign     Unable to Pay for Housing in the Last Year: No     Number of Times Moved in the Last Year: Not on file     Homeless in the Last Year: No     Current Medications[5]  No Known Allergies  There were no vitals filed for this visit.           [1]   Patient Active Problem List  Diagnosis    Negative depression screening    Overweight (BMI 25.0-29.9)    Essential hypertension    Annual physical exam    Hypercholesterolemia    Brain mass    Lung nodule    Metastatic adenocarcinoma (HCC)    Carcinoma of right lung (HCC)    Encounter for central line care    Chemotherapy-induced neutropenia (HCC)    History of CVA (cerebrovascular accident)    Generalized weakness    Tremors of nervous system    Seizure-like activity (HCC)    Constipation    Frontal mass of brain    Seizure disorder (HCC)    Left hemiparesis (HCC)    Pseudobulbar affect    Adjustment disorder    Sore throat    Bilateral edema of lower extremity     Cognitive impairment    Pancytopenia (HCC)    Shortness of breath    GERD (gastroesophageal reflux disease)    Metastasis to brain (HCC)   [2]   Past Medical History:  Diagnosis Date    Brain compression (HCC) 03/20/2023    Brain mass 03/20/2023    Cancer (HCC) 2001    Receint lung and brain lesions and prostate cancer in 2001    Cerebral edema (HCC) 03/20/2023    Hypertension     Lung cancer (HCC)     Prostate cancer (HCC) 2001    Rectal bleeding     Stroke (HCC)     2011     [3]   Past Surgical History:  Procedure Laterality Date    COLONOSCOPY      CRANIOTOMY Right 3/23/2023    Procedure: Right frontal CRANIOTOMY IMAGE-GUIDED FOR TUMOR;  Surgeon: Clark Carrillo MD;  [facility];  Service: Neurosurgery    ENDOBRONCHIAL ULTRASOUND (EBUS) N/A 4/16/2024    Procedure: ENDOBRONCHIAL ULTRASOUND (EBUS);  Surgeon: Kalia Sparrow MD;  [facility]: [facility];  Service: Thoracic    IR PORT PLACEMENT  4/27/2023    [place] [facility] INCL FLUOR GDNCE DX W/CELL WASHG SPX N/A 9/1/2023    Procedure: BRONCHOSCOPY FLEXIBLE;  Surgeon: Kalia Sparrow MD;  [facility];  Service: Thoracic    [facility]/CELL WASHG SPX N/A 4/16/2024    Procedure: BRONCHOSCOPY FLEXIBLE;  Surgeon: Kalia Sparrow MD;  [facility];  Service: Thoracic    FL CYSTOURETHROSCOPY N/A 3/23/2023    Procedure: EUA, DEL CASTILLO INSERTION;  Surgeon: Ajay Piedra MD;  [facility];  Service: Urology    FL THORACOSCOPY W/LOBECTOMY SINGLE LOBE Right 9/1/2023    Procedure: robotic assisted converted to open right upper lobectomy, lymph node dissection;  Surgeon: Kalia Sparrow MD;  [facility];  Service: Thoracic    PROSTATECTOMY  2001    THORACOTOMY Right 9/1/2023    Procedure: right thoracotomy with Cryo-Ablation;  Surgeon: Kalia Sparrow MD;  [facility];  Service: Thoracic    TONSILLECTOMY     [4]   Family History  Problem Relation Name Age of Onset    Dementia Mother Chely Pitts              Breast cancer Sister Crissy Chandra             Prostate cancer Brother Gerry Pitts         Received Radiation   [5]   Current Outpatient Medications:     acetaminophen (TYLENOL) 325 mg tablet, Take 2 tablets (650 mg total) by mouth every 6 (six) hours as needed for mild pain or fever, Disp: , Rfl:     amLODIPine (NORVASC) 10 mg tablet, Take 1 tablet (10 mg total) by mouth daily, Disp: 30 tablet, Rfl: 5    amoxicillin (AMOXIL) 500 mg capsule, , Disp: , Rfl:     atorvastatin (LIPITOR) 40 mg tablet, Take 1 tablet (40 mg total) by mouth daily after dinner, Disp: 30 tablet, Rfl: 5    BOSWELLIA DANYA PO, Take 4 g by mouth in the morning, Disp: , Rfl:     dexamethasone (DECADRON) 4 mg tablet, Take 2 tablets 2 times a day with meals Day before, Day of, and Day after chemotherapy., Disp: 12 tablet, Rfl: 0    lacosamide (VIMPAT) 200 mg tablet, Take 1 tablet (200 mg total) by mouth every 12 (twelve) hours, Disp: 60 tablet, Rfl: 5    levETIRAcetam (Keppra) 1000 MG tablet, Take 2 tablets (2,000 mg total) by mouth 2 (two) times a day, Disp: 120 tablet, Rfl: 11    loperamide (IMODIUM A-D) 2 MG tablet, Take 2 mg by mouth as needed in the morning and 2 mg as needed at noon and 2 mg as needed in the evening and 2 mg as needed before bedtime for diarrhea., Disp: , Rfl:     losartan (COZAAR) 50 mg tablet, Take 1 tablet (50 mg total) by mouth daily, Disp: 30 tablet, Rfl: 5    Turmeric (QC TUMERIC COMPLEX PO), Take by mouth in the morning, Disp: , Rfl:   No current facility-administered medications for this visit.    Facility-Administered Medications Ordered in Other Visits:     alteplase (CATHFLO) injection 2 mg, 2 mg, Intracatheter, Q1MIN PRN, Collin Langford MD

## 2025-07-25 ENCOUNTER — HOSPITAL ENCOUNTER (OUTPATIENT)
Dept: INFUSION CENTER | Facility: HOSPITAL | Age: 70
End: 2025-07-25
Attending: INTERNAL MEDICINE
Payer: COMMERCIAL

## 2025-07-25 VITALS — HEIGHT: 69 IN | WEIGHT: 192.68 LBS | BODY MASS INDEX: 28.54 KG/M2

## 2025-07-25 DIAGNOSIS — C79.9 METASTATIC ADENOCARCINOMA (HCC): Primary | ICD-10-CM

## 2025-07-25 DIAGNOSIS — T45.1X5A CHEMOTHERAPY-INDUCED NEUTROPENIA (HCC): ICD-10-CM

## 2025-07-25 DIAGNOSIS — C34.91 CARCINOMA OF RIGHT LUNG (HCC): ICD-10-CM

## 2025-07-25 DIAGNOSIS — D70.1 CHEMOTHERAPY-INDUCED NEUTROPENIA (HCC): ICD-10-CM

## 2025-07-25 PROCEDURE — 96413 CHEMO IV INFUSION 1 HR: CPT

## 2025-07-25 PROCEDURE — 96377 APPLICATON ON-BODY INJECTOR: CPT

## 2025-07-25 PROCEDURE — 96367 TX/PROPH/DG ADDL SEQ IV INF: CPT

## 2025-07-25 RX ORDER — SODIUM CHLORIDE 9 MG/ML
20 INJECTION, SOLUTION INTRAVENOUS ONCE
Status: COMPLETED | OUTPATIENT
Start: 2025-07-25 | End: 2025-07-25

## 2025-07-25 RX ADMIN — DOCETAXEL 124.2 MG: 20 INJECTION, SOLUTION, CONCENTRATE INTRAVENOUS at 09:40

## 2025-07-25 RX ADMIN — DEXAMETHASONE SODIUM PHOSPHATE: 10 INJECTION, SOLUTION INTRAMUSCULAR; INTRAVENOUS at 08:44

## 2025-07-25 RX ADMIN — PEGFILGRASTIM 6 MG: KIT SUBCUTANEOUS at 10:53

## 2025-07-25 RX ADMIN — SODIUM CHLORIDE 20 ML/HR: 0.9 INJECTION, SOLUTION INTRAVENOUS at 08:45

## 2025-07-25 NOTE — PROGRESS NOTES
Recent labs reviewed.  Patient tolerated taxotere treatment without reaction or issues.  Paras Hong  tolerated treatment well with no complications.      Paras Pitts is aware of future appt on 08/21/25 at 1100 for labs and port flush  but pt has a PET scan  on the 19th and his tx plan will depend on his scan results     AVS  No (Declined by Paras Pitts) pt has mychart      Patient ambulated off unit without incident.  All personal belongings taken with patient.

## 2025-07-25 NOTE — ASSESSMENT & PLAN NOTE
We reviewed the patient's repeat MRI brain in detail comparison to prior imaging studies.  On my review, his treated metastasis/cavity appears stable to slightly improved, though he does have ongoing to slightly increased edema.  Symptoms are overall stable and I have recommended continued observation with repeat MRI brain in 4 months.  Orders:    MRI Brain BT w wo Contrast; Future

## 2025-07-25 NOTE — PROGRESS NOTES
Follow-up Visit   Name: Paras Pitts      : 1955      MRN: [id number]  Encounter Provider: James Contreras MD  Encounter Date: 2025   Encounter department: [facility] RADIATION ONCOLOGY  :  Assessment & Plan  Metastasis to brain (HCC)  We reviewed the patient's repeat MRI brain in detail comparison to prior imaging studies.  On my review, his treated metastasis/cavity appears stable to slightly improved, though he does have ongoing to slightly increased edema.  Symptoms are overall stable and I have recommended continued observation with repeat MRI brain in 4 months.  Orders:    MRI Brain BT w wo Contrast; Future        History of Present Illness   Chief Complaint   Patient presents with    Follow-up     Radiation oncology    Pertinent Medical History   Mr. Paras Pitts is a 70 year old man with Stage JAY ([id number]) lung adenocarcinoma s/p resection of a right frontal metastasis followed by SRT. He thereafter underwent right sided lobectomy and MLNDx with pathology demonstrating negative margins, 5/22 LNs involved with SARAHY. He was thereafter continued on immunotherapy until he developed mediastinal POD. On 10/21/24 he completed a course of RT to a dose of 6000cGy in 30 fractions. He returns today for follow-up.     Radiation Summary:  SRT to R Frontal Cav to 3000cGy/5fx, completed 23  RT to Mediastinum to 6000cGy/30fx, completed 10/21/24    Interval History:  The patient was last seen in clinic on 3/11/25, at that time doing fair overall. He has since continued on systemic therapy with docetaxel.     MRI Brain (25) demonstrated:  Stable size of parenchymal enhancement in the paramedian right frontal lobe with increasing surrounding FLAIR abnormality. No evidence of increased cerebral blood volume. Findings likely represent posttreatment changes. Continued imaging follow-up   recommended. No new enhancing lesions are seen.    Currently he is doing fair overall.  He has  had some slight episodes of shaking, but is able to control these by stopping and calming down.  He has been taking jesenia boswellia and tumeric as previously discussed.        Oncology History   Cancer Staging   Carcinoma of right lung (HCC)  Staging form: Lung, AJCC 8th Edition  - Clinical: Stage JAY (cT3, cN1, cM1b) - Signed by James Contreras MD on 4/13/2023  - Pathologic stage from 9/1/2023: Stage JAY (pT2b, pN2, cM1b) - Signed by Treva Bah PA-C on 9/20/2023  Histopathologic type: Adenocarcinoma, NOS  Stage prefix: Initial diagnosis  Histologic grade (G): G3  Histologic grading system: 4 grade system  Oncology History   Metastatic adenocarcinoma (HCC)   2023 Initial Diagnosis    Metastatic adenocarcinoma (HCC)     3/23/2023 Biopsy    Brain, right frontal mass (biopsy):  - Metastatic non-small cell carcinoma     Comment:  - Tumor cells stain diffusely for CAM5.2, CKAE1/3, CK7, TTF1 and NapsinA with absent CK20, p63, CK5/6, p40 expression.  This immunopanel favors a metastatic lung adenocarcinoma.       5/18/2023 - 7/20/2023 Chemotherapy    cyanocobalamin, 1,000 mcg, Intramuscular, Once, 3 of 3 cycles  Administration: 1,000 mcg (6/29/2023), 1,000 mcg (5/18/2023), 1,000 mcg (7/20/2023)  alteplase (CATHFLO), 2 mg, Intracatheter, Every 1 Minute as needed, 4 of 4 cycles  pegfilgrastim (NEULASTA ONPRO), 6 mg, Subcutaneous, Once, 3 of 3 cycles  Administration: 6 mg (6/8/2023), 6 mg (6/29/2023), 6 mg (7/20/2023)  fosaprepitant (EMEND) IVPB, 150 mg, Intravenous, Once, 4 of 4 cycles  Administration: 150 mg (5/18/2023), 150 mg (6/29/2023), 150 mg (7/20/2023), 150 mg (6/8/2023)  nivolumab (OPDIVO) IVPB, 360 mg (150 % of original dose 240 mg), Intravenous, Once, 4 of 4 cycles  Dose modification: 360 mg (original dose 240 mg, Cycle 1, Reason: Dose modified as per discussion with consulting physician)  Administration: 360 mg (5/18/2023), 360 mg (6/8/2023), 360 mg (6/29/2023), 360 mg (7/20/2023)  CARBOplatin  (PARAPLATIN) IVPB (GOG AUC DOSING), 642.5 mg, Intravenous, Once, 4 of 4 cycles  Administration: 642.5 mg (5/18/2023), 642.5 mg (6/29/2023), 566.5 mg (7/20/2023), 650 mg (6/8/2023)  pemetrexed (ALIMTA) chemo infusion, 980 mg, Intravenous, Once, 4 of 4 cycles  Administration: 1,000 mg (5/18/2023), 1,000 mg (6/29/2023), 1,000 mg (7/20/2023), 1,000 mg (6/8/2023)     10/11/2023 - 10/11/2023 Chemotherapy    alteplase (CATHFLO), 2 mg, Intracatheter, Every 1 Minute as needed, 0 of 6 cycles  nivolumab (OPDIVO) IVPB, 240 mg, Intravenous, Once, 0 of 6 cycles     10/13/2023 - 2/23/2024 Chemotherapy    alteplase (CATHFLO), 2 mg, Intracatheter, Every 1 Minute as needed, 5 of 8 cycles  nivolumab (OPDIVO) IVPB, 480 mg, Intravenous, Once, 5 of 8 cycles  Administration: 480 mg (10/13/2023), 480 mg (11/10/2023), 480 mg (12/8/2023), 480 mg (1/5/2024), 480 mg (2/23/2024)     4/16/2024 Biopsy    ENDOBRONCHIAL ULTRASOUND (EBUS)     A-C. Lymph Node, Level 4R (ThinPrep, smear and cell block preparations):    - Metastatic non-small cell carcinoma, most compatible with lung primary; see note.    - Satisfactory for evaluation.     D-F. Lymph Node, Level 2R (ThinPrep, smear and cell block preparations):    - Metastatic non-small cell carcinoma.    - Non-small cell carcinoma, favor adenocarcinoma.    - Satisfactory for evaluation.     G. Lymph Node, Level 11R:    - Atypical cellular changes seen.    - Rare atypical epithelioid cells in a background of abundant benign bronchial cells.    - Lymphocytes present, compatible with adequate lymph node sampling.     5/15/2024 -  Chemotherapy    bevacizumab (AVASTIN) IVPB, 5 mg/kg = 457.5 mg (100 % of original dose 5 mg/kg), 3 of 3 cycles  Dose modification: 5 mg/kg (original dose 5 mg/kg, Cycle 1), 5 mg/kg (original dose 5 mg/kg, Cycle 1), 5 mg/kg (original dose 5 mg/kg, Cycle 2)  Administration: 457.5 mg (5/15/2024), 457.5 mg (5/30/2024), 457.5 mg (6/13/2024), 457.5 mg (6/27/2024)  DOCEtaxel (TAXOTERE)  chemo infusion, 75 mg/m2 = 155.2 mg, 13 of 16 cycles  Dose modification: 60 mg/m2 (original dose 75 mg/m2, Cycle 6, Reason: Dose Not Tolerated)  Administration: 155.2 mg (5/15/2024), 155.2 mg (6/27/2024), 155.2 mg (6/6/2024), 155.2 mg (7/18/2024), 155.2 mg (8/8/2024), 124.2 mg (2/14/2025), 124.2 mg (3/7/2025), 124.2 mg (3/28/2025), 124.2 mg (4/18/2025), 124.2 mg (5/9/2025), 124.2 mg (5/30/2025), 124.2 mg (6/27/2025), 124.2 mg (7/25/2025)     Carcinoma of right lung (HCC)   4/13/2023 Initial Diagnosis    Carcinoma of right lung (HCC)     4/13/2023 -  Cancer Staged    Staging form: Lung, AJCC 8th Edition  - Clinical: Stage JAY (cT3, cN1, cM1b) - Signed by James Contreras MD on 4/13/2023 4/19/2023 - 4/28/2023 Radiation    Plan ID Energy Fractions Dose per Fraction (cGy) Dose Correction (cGy) Total Dose Delivered (cGy) Elapsed Days   SRT R Frontal 6X-FFF 5 / 5 600 0 3,000 9         5/18/2023 - 7/20/2023 Chemotherapy    cyanocobalamin, 1,000 mcg, Intramuscular, Once, 3 of 3 cycles  Administration: 1,000 mcg (6/29/2023), 1,000 mcg (5/18/2023), 1,000 mcg (7/20/2023)  alteplase (CATHFLO), 2 mg, Intracatheter, Every 1 Minute as needed, 4 of 4 cycles  pegfilgrastim (NEULASTA ONPRO), 6 mg, Subcutaneous, Once, 3 of 3 cycles  Administration: 6 mg (6/8/2023), 6 mg (6/29/2023), 6 mg (7/20/2023)  fosaprepitant (EMEND) IVPB, 150 mg, Intravenous, Once, 4 of 4 cycles  Administration: 150 mg (5/18/2023), 150 mg (6/29/2023), 150 mg (7/20/2023), 150 mg (6/8/2023)  nivolumab (OPDIVO) IVPB, 360 mg (150 % of original dose 240 mg), Intravenous, Once, 4 of 4 cycles  Dose modification: 360 mg (original dose 240 mg, Cycle 1, Reason: Dose modified as per discussion with consulting physician)  Administration: 360 mg (5/18/2023), 360 mg (6/8/2023), 360 mg (6/29/2023), 360 mg (7/20/2023)  CARBOplatin (PARAPLATIN) IVPB (GO AUC DOSING), 642.5 mg, Intravenous, Once, 4 of 4 cycles  Administration: 642.5 mg (5/18/2023), 642.5 mg  (6/29/2023), 566.5 mg (7/20/2023), 650 mg (6/8/2023)  pemetrexed (ALIMTA) chemo infusion, 980 mg, Intravenous, Once, 4 of 4 cycles  Administration: 1,000 mg (5/18/2023), 1,000 mg (6/29/2023), 1,000 mg (7/20/2023), 1,000 mg (6/8/2023)     9/1/2023 -  Cancer Staged    Staging form: Lung, AJCC 8th Edition  - Pathologic stage from 9/1/2023: Stage JAY (pT2b, pN2, cM1b) - Signed by Treva Bah PA-C on 9/20/2023  Histopathologic type: Adenocarcinoma, NOS  Stage prefix: Initial diagnosis  Histologic grade (G): G3  Histologic grading system: 4 grade system       9/1/2023 Surgery    Right robotic converted to open upper lobectomy      9/1/2023 Biopsy    A.  Lung, right upper lobe:     - Invasive poorly differentiated solid adenocarcinoma of the lung, 4.4 cm.     - Tumor invades into, but not through, the visceral pleura (PL1), confirmed by special VVG stains.     - Lymphatic channel invasion by tumor identified.     - Metastatic carcinoma present in three of thirteen lymph nodes (3/13).       -- Rare fibrotic granulomas present.     - All margins are negative for tumor.     - Emphysematous changes.     B.  Lymph node, level 8:     - Negative for malignancy (0/1).     C.  Lymph node, level 7, #1:     - Negative for malignancy (0/1).     D.  Lymph node, level 4R:     - Negative for malignancy (0/1).     E.  Lymph node, level 4R, #2:     - Fibrovascular adipose tissue; negative for malignancy.     - No lymphoid tissue identified.     F.  Lymph node, level 2R, #1:     - Metastatic carcinoma present in one of three lymph nodes (1/3).     G.  Lymph node, level 2R, #2:     - Negative for malignancy (0/1).     H.  Lymph node, level 11 posterior:     - Single lymph node positive for metastatic carcinoma (1/1).     I.  Lymph node, portion of level 12 on artery:     - Negative for malignancy (0/1).     - Non-caseating granulomatous inflammation present.        -- Special stains for acid fast bacilli and fungal organisms are  negative.        10/11/2023 - 10/11/2023 Chemotherapy    alteplase (CATHFLO), 2 mg, Intracatheter, Every 1 Minute as needed, 0 of 6 cycles  nivolumab (OPDIVO) IVPB, 240 mg, Intravenous, Once, 0 of 6 cycles     10/13/2023 - 2/23/2024 Chemotherapy    alteplase (CATHFLO), 2 mg, Intracatheter, Every 1 Minute as needed, 5 of 8 cycles  nivolumab (OPDIVO) IVPB, 480 mg, Intravenous, Once, 5 of 8 cycles  Administration: 480 mg (10/13/2023), 480 mg (11/10/2023), 480 mg (12/8/2023), 480 mg (1/5/2024), 480 mg (2/23/2024)     4/16/2024 Biopsy    ENDOBRONCHIAL ULTRASOUND (EBUS)     A-C. Lymph Node, Level 4R (ThinPrep, smear and cell block preparations):    - Metastatic non-small cell carcinoma, most compatible with lung primary; see note.    - Satisfactory for evaluation.     D-F. Lymph Node, Level 2R (ThinPrep, smear and cell block preparations):    - Metastatic non-small cell carcinoma.    - Non-small cell carcinoma, favor adenocarcinoma.    - Satisfactory for evaluation.     G. Lymph Node, Level 11R:    - Atypical cellular changes seen.    - Rare atypical epithelioid cells in a background of abundant benign bronchial cells.    - Lymphocytes present, compatible with adequate lymph node sampling.     5/15/2024 -  Chemotherapy    bevacizumab (AVASTIN) IVPB, 5 mg/kg = 457.5 mg (100 % of original dose 5 mg/kg), 3 of 3 cycles  Dose modification: 5 mg/kg (original dose 5 mg/kg, Cycle 1), 5 mg/kg (original dose 5 mg/kg, Cycle 1), 5 mg/kg (original dose 5 mg/kg, Cycle 2)  Administration: 457.5 mg (5/15/2024), 457.5 mg (5/30/2024), 457.5 mg (6/13/2024), 457.5 mg (6/27/2024)  DOCEtaxel (TAXOTERE) chemo infusion, 75 mg/m2 = 155.2 mg, 13 of 16 cycles  Dose modification: 60 mg/m2 (original dose 75 mg/m2, Cycle 6, Reason: Dose Not Tolerated)  Administration: 155.2 mg (5/15/2024), 155.2 mg (6/27/2024), 155.2 mg (6/6/2024), 155.2 mg (7/18/2024), 155.2 mg (8/8/2024), 124.2 mg (2/14/2025), 124.2 mg (3/7/2025), 124.2 mg (3/28/2025), 124.2 mg  "(4/18/2025), 124.2 mg (5/9/2025), 124.2 mg (5/30/2025), 124.2 mg (6/27/2025), 124.2 mg (7/25/2025)     9/9/2024 - 10/21/2024 Radiation    Treatments:  Course: C2    Plan ID Energy Fractions Dose per Fraction (cGy) Dose Correction (cGy) Total Dose Delivered (cGy) Elapsed Days   R LUNG Med 6X 30 / 30 200 0 6,000 42      Treatment Dates:  9/9/2024 - 10/21/2024.      Metastasis to brain (HCC)   4/19/2023 - 4/28/2023 Radiation    Plan ID Energy Fractions Dose per Fraction (cGy) Dose Correction (cGy) Total Dose Delivered (cGy) Elapsed Days   SRT R Frontal 6X-FFF 5 / 5 600 0 3,000 9         11/21/2024 Initial Diagnosis    Metastasis to brain (HCC)        Review of Systems Refer to nursing note.          Objective   /70 (BP Location: Right arm, Patient Position: Sitting, Cuff Size: Large)   Pulse 73   Temp 97.6 °F (36.4 °C) (Temporal)   Resp 18   Ht 5' 9\" (1.753 m)   Wt 87.5 kg (193 lb)   SpO2 98%   BMI 28.50 kg/m²     Pain Screening:  Pain Score: 0-No pain  ECOG ECOG Performance Status: 2 - Ambulatory and capable of all selfcare but unable to carry out any work activities.  Up and about more than 50% of waking hours  Physical Exam   Fair appearing.  NAD.  No increased work of breathing.  Extremities warm and perfused.    Administrative Statements   I have spent a total time of 26 minutes in caring for this patient on the day of the visit/encounter including Diagnostic results, Reviewing/placing orders in the medical record (including tests, medications, and/or procedures), and Obtaining or reviewing history  .  Portions of the record may have been created with voice recognition software.  Occasional wrong word or \"sound a like\" substitutions may have occurred due to the inherent limitations of voice recognition software.  Read the chart carefully and recognize, using context, where substitutions have occurred.  "

## 2025-07-30 DIAGNOSIS — I10 HYPERTENSION: ICD-10-CM

## 2025-07-30 DIAGNOSIS — Z76.0 MEDICATION REFILL: ICD-10-CM

## 2025-07-30 RX ORDER — LOSARTAN POTASSIUM 50 MG/1
50 TABLET ORAL DAILY
Qty: 90 TABLET | Refills: 1 | Status: SHIPPED | OUTPATIENT
Start: 2025-07-30

## 2025-08-19 ENCOUNTER — TELEPHONE (OUTPATIENT)
Dept: INFUSION CENTER | Facility: HOSPITAL | Age: 70
End: 2025-08-19

## 2025-08-19 ENCOUNTER — HOSPITAL ENCOUNTER (OUTPATIENT)
Dept: RADIOLOGY | Age: 70
Discharge: HOME | End: 2025-08-19
Attending: INTERNAL MEDICINE
Payer: COMMERCIAL

## 2025-08-19 DIAGNOSIS — T45.1X5A CHEMOTHERAPY-INDUCED NEUTROPENIA (HCC): ICD-10-CM

## 2025-08-19 DIAGNOSIS — D70.1 CHEMOTHERAPY-INDUCED NEUTROPENIA (HCC): ICD-10-CM

## 2025-08-19 DIAGNOSIS — C34.91 CARCINOMA OF RIGHT LUNG (HCC): ICD-10-CM

## 2025-08-19 DIAGNOSIS — C34.91 CARCINOMA OF RIGHT LUNG (HCC): Primary | ICD-10-CM

## 2025-08-19 DIAGNOSIS — C79.9 METASTATIC ADENOCARCINOMA (HCC): ICD-10-CM

## 2025-08-19 LAB — GLUCOSE SERPL-MCNC: 113 MG/DL (ref 65–140)

## 2025-08-19 PROCEDURE — A9552 F18 FDG: HCPCS

## 2025-08-19 PROCEDURE — 82948 REAGENT STRIP/BLOOD GLUCOSE: CPT

## 2025-08-19 PROCEDURE — 78815 PET IMAGE W/CT SKULL-THIGH: CPT

## 2025-08-22 ENCOUNTER — OFFICE VISIT (OUTPATIENT)
Dept: FAMILY MEDICINE CLINIC | Facility: CLINIC | Age: 70
End: 2025-08-22
Payer: COMMERCIAL

## 2025-08-22 VITALS
BODY MASS INDEX: 28.29 KG/M2 | HEART RATE: 84 BPM | WEIGHT: 191 LBS | HEIGHT: 69 IN | OXYGEN SATURATION: 98 % | SYSTOLIC BLOOD PRESSURE: 138 MMHG | DIASTOLIC BLOOD PRESSURE: 72 MMHG | TEMPERATURE: 98.7 F

## 2025-08-22 DIAGNOSIS — C79.31 METASTASIS TO BRAIN (HCC): ICD-10-CM

## 2025-08-22 DIAGNOSIS — I10 ESSENTIAL HYPERTENSION: Primary | ICD-10-CM

## 2025-08-22 DIAGNOSIS — E78.00 HYPERCHOLESTEROLEMIA: ICD-10-CM

## 2025-08-22 DIAGNOSIS — R73.09 ELEVATED GLUCOSE: ICD-10-CM

## 2025-08-22 LAB — SL AMB POCT HEMOGLOBIN AIC: 5.9 (ref ?–6.5)

## 2025-08-22 PROCEDURE — 99214 OFFICE O/P EST MOD 30 MIN: CPT | Performed by: STUDENT IN AN ORGANIZED HEALTH CARE EDUCATION/TRAINING PROGRAM

## 2025-08-22 PROCEDURE — 83036 HEMOGLOBIN GLYCOSYLATED A1C: CPT | Performed by: STUDENT IN AN ORGANIZED HEALTH CARE EDUCATION/TRAINING PROGRAM

## (undated) DEVICE — MAYFIELD® DISPOSABLE ADULT SKULL PIN (PLASTIC BASE): Brand: MAYFIELD®

## (undated) DEVICE — CANNULA SEAL

## (undated) DEVICE — SUT VICRYL 0 CT-1 27 IN J260H

## (undated) DEVICE — SYRINGE 20ML LL

## (undated) DEVICE — PETRI DISH STERILE

## (undated) DEVICE — HEMOCLIP CARTRIDGE LRG

## (undated) DEVICE — PROXIMATE SKIN STAPLERS (35 WIDE) CONTAINS 35 STAINLESS STEEL STAPLES (FIXED HEAD): Brand: PROXIMATE

## (undated) DEVICE — THE ECHELON, ECHELON ENDOPATH™ AND ECHELON FLEX™ FAMILIES OF ENDOSCOPIC LINEAR CUTTERS AND RELOADS ARE STERILE, SINGLE PATIENT USE INSTRUMENTS THAT SIMULTANEOUSLY CUT AND STAPLE TISSUE. THERE ARE SIX STAGGERED ROWS OF STAPLES, THREE ON EITHER SIDE OF THE CUT LINE. THE 45 MM INSTRUMENTS HAVE A STAPLE LINE THATIS APPROXIMATELY 45 MM LONG AND A CUT LINE THAT IS APPROXIMATELY 42 MM LONG. THE SHAFT CAN ROTATE FREELY IN BOTH DIRECTIONS AND AN ARTICULATION MECHANISM ON ARTICULATING INSTRUMENTS ENABLES BENDING THE DISTAL PORTIONOF THE SHAFT TO FACILITATE LATERAL ACCESS OF THE OPERATIVE SITE.THE INSTRUMENTS ARE SHIPPED WITHOUT A RELOAD AND MUST BE LOADED PRIOR TO USE. A STAPLE RETAINING CAP ON THE RELOAD PROTECTS THE STAPLE LEG POINTS DURING SHIPPING AND TRANSPORTATION. THE INSTRUMENTS’ LOCK-OUT FEATURE IS DESIGNED TO PREVENT A USED RELOAD FROM BEING REFIRED.: Brand: ECHELON ENDOPATH

## (undated) DEVICE — SUREFORM 45 RELOAD BLUE: Brand: SUREFORM

## (undated) DEVICE — OCCLUSIVE GAUZE STRIP,3% BISMUTH TRIBROMOPHENATE IN PETROLATUM BLEND: Brand: XEROFORM

## (undated) DEVICE — GAUZE SPONGES,16 PLY: Brand: CURITY

## (undated) DEVICE — MARKER REFLECTIVE RADIOPAQUE SPHERE

## (undated) DEVICE — 3M™ IOBAN™ 2 ANTIMICROBIAL INCISE DRAPE 6650EZ: Brand: IOBAN™ 2

## (undated) DEVICE — TROCARS: Brand: KII® OPTICAL ACCESS SYSTEM

## (undated) DEVICE — SURGICEL 4 X 8

## (undated) DEVICE — STAPLER 30 RELOAD WHITE: Brand: ENDOWRIST

## (undated) DEVICE — SINGLE USE SUCTION VALVE MAJ-209: Brand: SINGLE USE SUCTION VALVE (STERILE)

## (undated) DEVICE — DISPOSABLE EQUIPMENT COVER: Brand: SMALL TOWEL DRAPE

## (undated) DEVICE — CANISTER FOR THORACIC SUCTION SYSTEM: Brand: THOPAZ DISPOSABLE CANISTER 0.8L

## (undated) DEVICE — FIRST STEP BEDSIDE KIT - STAND-UP POUCH, ENDOSCOPIC CLEANING PAD - 1 POUCH: Brand: FIRST STEP BEDSIDE KIT - STAND-UP POUCH, ENDOSCOPIC CLEANING PAD

## (undated) DEVICE — SINGLE TUBING WITH LARGE CONNECTOR FOR THORACIC SUCTION SYSTEM PUMP: Brand: THOPAZ TUBING SINGLE

## (undated) DEVICE — SINGLE USE ASPIRATION NEEDLE: Brand: SINGLE USE ASPIRATION NEEDLE

## (undated) DEVICE — ADHESIVE SKIN HIGH VISCOSITY EXOFIN 1ML

## (undated) DEVICE — TELFA NON-ADHERENT ABSORBENT DRESSING: Brand: TELFA

## (undated) DEVICE — PROGEL LONG TIP 6 IN

## (undated) DEVICE — HEYMAN DILATOR 22 FR

## (undated) DEVICE — SUT VICRYL 2-0 CT-1 27 IN J259H

## (undated) DEVICE — CHLORAPREP HI-LITE 26ML ORANGE

## (undated) DEVICE — SUT VICRYL 0 CT-1 18 IN J740D

## (undated) DEVICE — UTILITY MARKER,BLACK WITH LABELS: Brand: DEVON

## (undated) DEVICE — SUREFORM 45: Brand: SUREFORM

## (undated) DEVICE — ROSEBUD DISSECTORS: Brand: DEROYAL

## (undated) DEVICE — ADAPTOR TRACH SWIVEL

## (undated) DEVICE — IV EXTENSION TUBING 33 IN

## (undated) DEVICE — IMPLANTABLE DEVICE
Type: IMPLANTABLE DEVICE | Site: CRANIAL | Status: NON-FUNCTIONAL
Brand: THINFLAP

## (undated) DEVICE — SYRINGE 10ML SLIP TIP LF

## (undated) DEVICE — VESSEL LOOPS X-RAY DETECTABLE: Brand: DEROYAL

## (undated) DEVICE — GUIDEWIRE ANGLE TIP 0.038 IN SOLO PLUS

## (undated) DEVICE — HEYMAN DILATOR 12 FR

## (undated) DEVICE — THE ECHELON FLEX POWERED PLUS ARTICULATING ENDOSCOPIC LINEAR CUTTERS ARE STERILE, SINGLE PATIENT USE INSTRUMENTS THAT SIMULTANEOUSLYCUT AND STAPLE TISSUE. THERE ARE SIX STAGGERED ROWS OF STAPLES, THREE ON EITHER SIDE OF THE CUT LINE. THE ECHELON FLEX 45 POWERED PLUSINSTRUMENTS HAVE A STAPLE LINE THAT IS APPROXIMATELY 45 MM LONG AND A CUT LINE THAT IS APPROXIMATELY 42 MM LONG. THE SHAFT CAN ROTATE FREELYIN BOTH DIRECTIONS AND AN ARTICULATION MECHANISM ENABLES THE DISTAL PORTION OF THE SHAFT TO PIVOT TO FACILITATE LATERAL ACCESS TO THE OPERATIVESITE.THE INSTRUMENTS ARE PACKAGED WITH A PRIMARY LITHIUM BATTERY PACK THAT MUST BE INSTALLED PRIOR TO USE. THERE ARE SPECIFIC REQUIREMENTS FORDISPOSING OF THE BATTERY PACK. REFER TO THE BATTERY PACK DISPOSAL SECTION.THE INSTRUMENTS ARE PACKAGED WITHOUT A RELOAD AND MUST BE LOADED PRIOR TO USE. A STAPLE RETAINING CAP ON THE RELOAD PROTECTS THE STAPLE LEGPOINTS DURING SHIPPING AND TRANSPORTATION. THE INSTRUMENTS’ LOCK-OUT FEATURE IS DESIGNED TO PREVENT A USED OR IMPROPERLY INSTALLED RELOADFROM BEING REFIRED OR AN INSTRUMENT FROM BEING FIRED WITHOUT A RELOAD.: Brand: ECHELON FLEX

## (undated) DEVICE — PAD GROUNDING ADULT

## (undated) DEVICE — SINGLE USE BIOPSY VALVE MAJ-210: Brand: SINGLE USE BIOPSY VALVE (STERILE)

## (undated) DEVICE — PACK CRANIOTOMY PBDS RF

## (undated) DEVICE — 28 FR STRAIGHT – SOFT PVC CATHETER: Brand: PVC THORACIC CATHETERS

## (undated) DEVICE — HEYMAN DILATOR 10 FR

## (undated) DEVICE — COLUMN DRAPE

## (undated) DEVICE — VISUALIZATION SYSTEM: Brand: CLEARIFY

## (undated) DEVICE — CURITY STRETCH BANDAGE: Brand: CURITY

## (undated) DEVICE — CURVED BIPOLAR DISSECTOR: Brand: ENDOWRIST

## (undated) DEVICE — MONITORING SPINAL IMPULSE CASE FEE

## (undated) DEVICE — STERILE EMESIS BASIN                 070: Brand: CARDINAL HEALTH

## (undated) DEVICE — TRAY FOLEY 16FR URIMETER SURESTEP

## (undated) DEVICE — GLOVE SRG BIOGEL 7.5

## (undated) DEVICE — HEYMAN DILATOR 20 FR

## (undated) DEVICE — SPONGE SCRUB 4 PCT CHLORHEXIDINE

## (undated) DEVICE — INTENDED FOR TISSUE SEPARATION, AND OTHER PROCEDURES THAT REQUIRE A SHARP SURGICAL BLADE TO PUNCTURE OR CUT.: Brand: BARD-PARKER ® CARBON RIB-BACK BLADES

## (undated) DEVICE — SUT SILK 0 30 IN A306H

## (undated) DEVICE — DRAIN SPONGES,6 PLY: Brand: EXCILON

## (undated) DEVICE — SUT VICRYL PLUS 1 TPB-1 27IN VCPB650G

## (undated) DEVICE — Device: Brand: BALLOON

## (undated) DEVICE — STAPLER 30 RELOAD: Brand: ENDOWRIST

## (undated) DEVICE — INTENDED FOR TISSUE SEPARATION, AND OTHER PROCEDURES THAT REQUIRE A SHARP SURGICAL BLADE TO PUNCTURE OR CUT.: Brand: BARD-PARKER SAFETY BLADES SIZE 10, STERILE

## (undated) DEVICE — KIT, BETHLEHEM THORACIC ROBOT: Brand: CARDINAL HEALTH

## (undated) DEVICE — SYRINGE 10ML LL

## (undated) DEVICE — TUBING SUCTION 5MM X 12 FT

## (undated) DEVICE — URIMETER 2500ML

## (undated) DEVICE — ECHELON FLEX  POWERED VASCULAR STAPLER WITH ADVANCED PLACEMENT TIP, 35MM: Brand: ECHELON FLEX

## (undated) DEVICE — CADIERE FORCEPS: Brand: ENDOWRIST

## (undated) DEVICE — PROGEL

## (undated) DEVICE — SUT NUROLON 4-0 TF CR/8 18 IN C584D

## (undated) DEVICE — CATH FOLEY COUNCIL 16FR 5ML 2 WAY LUBRICATH

## (undated) DEVICE — TOWEL SURG XR DETECT GREEN STRL RFD

## (undated) DEVICE — TOOL MR8-9MH30 MR8 9CM MATCH 3MM: Brand: MIDAS REX MR8

## (undated) DEVICE — GAUZE ROLL KITTNER

## (undated) DEVICE — CURVED-TIP STAPLER 30: Brand: ENDOWRIST

## (undated) DEVICE — ARM DRAPE

## (undated) DEVICE — BETADINE OINTMENT FOIL PACK

## (undated) DEVICE — REDUCER: Brand: ENDOWRIST

## (undated) DEVICE — TRAY FOLEY 16FR URIMETER SILICONE SURESTEP

## (undated) DEVICE — Device: Brand: IQ SYSTEM

## (undated) DEVICE — SUT VICRYL 2-0 SH 27 IN UNDYED J417H

## (undated) DEVICE — HEYMAN DILATOR 24 FR

## (undated) DEVICE — STOPCOCK 4-WAY

## (undated) DEVICE — IMPLANTABLE DEVICE
Type: IMPLANTABLE DEVICE | Site: CRANIAL | Status: NON-FUNCTIONAL
Brand: THINFLAP SYSTEM

## (undated) DEVICE — ELECTRO LUBE IS A SINGLE PATIENT USE DEVICE THAT IS INTENDED TO BE USED ON ELECTROSURGICAL ELECTRODES TO REDUCE STICKING.: Brand: KEY SURGICAL ELECTRO LUBE

## (undated) DEVICE — ENDOPATH ECHELON VASCULAR  RELOADS, WHITE, 35MM: Brand: ECHELON ENDOPATH

## (undated) DEVICE — HEYMAN DILATOR 18 FR

## (undated) DEVICE — GLOVE INDICATOR PI UNDERGLOVE SZ 8 BLUE

## (undated) DEVICE — PROBE CRYOSPERE 18IN

## (undated) DEVICE — SPECIMEN CONTAINER STERILE PEEL PACK

## (undated) DEVICE — GLOVE SRG BIOGEL ECLIPSE 7.5

## (undated) DEVICE — DRAPE INTESTINAL ISOLATION BAG

## (undated) DEVICE — SUT MONOCRYL 4-0 PS-2 27 IN Y426H

## (undated) DEVICE — GLOVE SRG BIOGEL 7

## (undated) DEVICE — LIGHT HANDLE COVER SLEEVE DISP BLUE STELLAR

## (undated) DEVICE — HEYMAN DILATOR 16 FR

## (undated) DEVICE — ELECTRODE BLADE MOD E-Z CLEAN 2.5IN 6.4CM -0012M

## (undated) DEVICE — INTENDED FOR TISSUE SEPARATION, AND OTHER PROCEDURES THAT REQUIRE A SHARP SURGICAL BLADE TO PUNCTURE OR CUT.: Brand: BARD-PARKER SAFETY BLADES SIZE 15, STERILE

## (undated) DEVICE — SUT VICRYL 3-0 SH 27 IN J416H

## (undated) DEVICE — NEEDLE HYPO 22G X 1-1/2 IN

## (undated) DEVICE — MARYLAND BIPOLAR FORCEPS: Brand: ENDOWRIST

## (undated) DEVICE — SUT PROLENE 0 CT-1 30 IN 8424H

## (undated) DEVICE — STAPLER SHEATH: Brand: ENDOWRIST

## (undated) DEVICE — DRAPE SHEET THREE QUARTER

## (undated) DEVICE — SEAL

## (undated) DEVICE — ANTIBACTERIAL VIOLET BRAIDED (POLYGLACTIN 910), SYNTHETIC ABSORBABLE SUTURE: Brand: COATED VICRYL

## (undated) DEVICE — NEEDLE SPINAL 20G X 3.5 LF

## (undated) DEVICE — CATH SECURE FOLEY

## (undated) DEVICE — TOOL MR8-F2/7TA23 MR8 F2/7CM TAPER 2.3MM: Brand: MIDAS REX MR8

## (undated) DEVICE — SUPPLY FEE STD

## (undated) DEVICE — HEAVY DUTY TABLE COVER: Brand: CONVERTORS

## (undated) DEVICE — TIP-UP FENESTRATED GRASPER: Brand: ENDOWRIST

## (undated) DEVICE — HEMOCLIP CARTRIDGE MED

## (undated) DEVICE — KERLIX BANDAGE ROLL: Brand: KERLIX